# Patient Record
Sex: FEMALE | Race: BLACK OR AFRICAN AMERICAN | Employment: OTHER | URBAN - METROPOLITAN AREA
[De-identification: names, ages, dates, MRNs, and addresses within clinical notes are randomized per-mention and may not be internally consistent; named-entity substitution may affect disease eponyms.]

---

## 2017-01-25 ENCOUNTER — HOSPITAL ENCOUNTER (EMERGENCY)
Age: 62
Discharge: HOME | End: 2017-01-25
Attending: EMERGENCY MEDICINE
Payer: MEDICARE

## 2017-01-25 ENCOUNTER — APPOINTMENT (OUTPATIENT)
Dept: GENERAL RADIOLOGY | Age: 62
End: 2017-01-25
Attending: EMERGENCY MEDICINE
Payer: MEDICARE

## 2017-01-25 VITALS
TEMPERATURE: 98.7 F | DIASTOLIC BLOOD PRESSURE: 96 MMHG | BODY MASS INDEX: 41.95 KG/M2 | HEIGHT: 70 IN | OXYGEN SATURATION: 98 % | HEART RATE: 75 BPM | SYSTOLIC BLOOD PRESSURE: 193 MMHG | RESPIRATION RATE: 22 BRPM | WEIGHT: 293 LBS

## 2017-01-25 DIAGNOSIS — J45.901 ASTHMA WITH ACUTE EXACERBATION, UNSPECIFIED ASTHMA SEVERITY: ICD-10-CM

## 2017-01-25 DIAGNOSIS — J18.9 COMMUNITY ACQUIRED PNEUMONIA: Primary | ICD-10-CM

## 2017-01-25 PROCEDURE — 74011000250 HC RX REV CODE- 250: Performed by: EMERGENCY MEDICINE

## 2017-01-25 PROCEDURE — 99285 EMERGENCY DEPT VISIT HI MDM: CPT

## 2017-01-25 PROCEDURE — 94640 AIRWAY INHALATION TREATMENT: CPT

## 2017-01-25 PROCEDURE — 77030013140 HC MSK NEB VYRM -A

## 2017-01-25 PROCEDURE — 71020 XR CHEST PA LAT: CPT

## 2017-01-25 PROCEDURE — 74011636637 HC RX REV CODE- 636/637: Performed by: EMERGENCY MEDICINE

## 2017-01-25 PROCEDURE — A9270 NON-COVERED ITEM OR SERVICE: HCPCS | Performed by: EMERGENCY MEDICINE

## 2017-01-25 PROCEDURE — 74011250637 HC RX REV CODE- 250/637: Performed by: EMERGENCY MEDICINE

## 2017-01-25 RX ORDER — PREDNISONE 20 MG/1
60 TABLET ORAL DAILY
Qty: 15 TAB | Refills: 0 | Status: SHIPPED | OUTPATIENT
Start: 2017-01-25 | End: 2017-01-30

## 2017-01-25 RX ORDER — IPRATROPIUM BROMIDE AND ALBUTEROL SULFATE 2.5; .5 MG/3ML; MG/3ML
3 SOLUTION RESPIRATORY (INHALATION)
Status: COMPLETED | OUTPATIENT
Start: 2017-01-25 | End: 2017-01-25

## 2017-01-25 RX ORDER — LEVOFLOXACIN 750 MG/1
750 TABLET ORAL DAILY
Qty: 7 TAB | Refills: 0 | Status: ON HOLD | OUTPATIENT
Start: 2017-01-25 | End: 2017-02-02

## 2017-01-25 RX ORDER — PREDNISONE 20 MG/1
60 TABLET ORAL
Status: COMPLETED | OUTPATIENT
Start: 2017-01-25 | End: 2017-01-25

## 2017-01-25 RX ORDER — LEVOFLOXACIN 750 MG/1
750 TABLET ORAL
Status: COMPLETED | OUTPATIENT
Start: 2017-01-25 | End: 2017-01-25

## 2017-01-25 RX ORDER — IPRATROPIUM BROMIDE AND ALBUTEROL SULFATE 2.5; .5 MG/3ML; MG/3ML
3 SOLUTION RESPIRATORY (INHALATION)
Qty: 30 NEBULE | Refills: 0 | Status: ON HOLD | OUTPATIENT
Start: 2017-01-25 | End: 2017-02-02

## 2017-01-25 RX ADMIN — PREDNISONE 60 MG: 20 TABLET ORAL at 17:43

## 2017-01-25 RX ADMIN — LEVOFLOXACIN 750 MG: 750 TABLET, FILM COATED ORAL at 18:27

## 2017-01-25 RX ADMIN — IPRATROPIUM BROMIDE AND ALBUTEROL SULFATE 3 ML: .5; 2.5 SOLUTION RESPIRATORY (INHALATION) at 17:44

## 2017-01-25 NOTE — DISCHARGE INSTRUCTIONS
Asthma Attack: Care Instructions  Your Care Instructions    During an asthma attack, the airways swell and narrow. This makes it hard to breathe. Severe asthma attacks can be life-threatening, but you can help prevent them by keeping your asthma under control and treating symptoms before they get bad. Symptoms include being short of breath, having chest tightness, coughing, and wheezing. Noting and treating these symptoms can also help you avoid future trips to the emergency room. The doctor has checked you carefully, but problems can develop later. If you notice any problems or new symptoms, get medical treatment right away. Follow-up care is a key part of your treatment and safety. Be sure to make and go to all appointments, and call your doctor if you are having problems. It's also a good idea to know your test results and keep a list of the medicines you take. How can you care for yourself at home? · Follow your asthma action plan to prevent and treat attacks. If you don't have an asthma action plan, work with your doctor to create one. · Take your asthma medicines exactly as prescribed. Talk to your doctor right away if you have any questions about how to take them. ¨ Use your quick-relief medicine when you have symptoms of an attack. Quick-relief medicine is usually an albuterol inhaler. Some people need to use quick-relief medicine before they exercise. ¨ Take your controller medicine every day, not just when you have symptoms. Controller medicine is usually an inhaled corticosteroid. The goal is to prevent problems before they occur. Don't use your controller medicine to treat an attack that has already started. It doesn't work fast enough to help. ¨ If your doctor prescribed corticosteroid pills to use during an attack, take them exactly as prescribed. It may take hours for the pills to work, but they may make the episode shorter and help you breathe better.   ¨ Keep your quick-relief medicine with you at all times. · Talk to your doctor before using other medicines. Some medicines, such as aspirin, can cause asthma attacks in some people. · If you have a peak flow meter, use it to check how well you are breathing. This can help you predict when an asthma attack is going to occur. Then you can take medicine to prevent the asthma attack or make it less severe. · Do not smoke or allow others to smoke around you. Avoid smoky places. Smoking makes asthma worse. If you need help quitting, talk to your doctor about stop-smoking programs and medicines. These can increase your chances of quitting for good. · Learn what triggers an asthma attack for you, and avoid the triggers when you can. Common triggers include colds, smoke, air pollution, dust, pollen, mold, pets, cockroaches, stress, and cold air. · Avoid colds and the flu. Get a pneumococcal vaccine shot. If you have had one before, ask your doctor if you need a second dose. Get a flu vaccine every fall. If you must be around people with colds or the flu, wash your hands often. When should you call for help? Call 911 anytime you think you may need emergency care. For example, call if:  · You have severe trouble breathing. Call your doctor now or seek immediate medical care if:  · Your symptoms do not get better after you have followed your asthma action plan. · You have new or worse trouble breathing. · Your coughing and wheezing get worse. · You cough up dark brown or bloody mucus (sputum). · You have a new or higher fever. Watch closely for changes in your health, and be sure to contact your doctor if:  · You need to use quick-relief medicine on more than 2 days a week (unless it is just for exercise). · You cough more deeply or more often, especially if you notice more mucus or a change in the color of your mucus. · You are not getting better as expected. Where can you learn more?   Go to http://lindsay-kai.info/. Enter W975 in the search box to learn more about \"Asthma Attack: Care Instructions. \"  Current as of: May 23, 2016  Content Version: 11.1  © 8836-2933 [employer]. Care instructions adapted under license by [employer] (which disclaims liability or warranty for this information). If you have questions about a medical condition or this instruction, always ask your healthcare professional. [place] [address] any warranty or liability for your use of this information. Pneumonia: Care Instructions  Your Care Instructions    Pneumonia is an infection of the lungs. Most cases are caused by infections from bacteria or viruses. Pneumonia may be mild or very severe. If it is caused by bacteria, you will be treated with antibiotics. It may take a few weeks to a few months to recover fully from pneumonia, depending on how sick you were and whether your overall health is good. Follow-up care is a key part of your treatment and safety. Be sure to make and go to all appointments, and call your doctor if you are having problems. Its also a good idea to know your test results and keep a list of the medicines you take. How can you care for yourself at home? · Take your antibiotics exactly as directed. Do not stop taking the medicine just because you are feeling better. You need to take the full course of antibiotics. · Take your medicines exactly as prescribed. Call your doctor if you think you are having a problem with your medicine. · Get plenty of rest and sleep. You may feel weak and tired for a while, but your energy level will improve with time. · To prevent dehydration, drink plenty of fluids, enough so that your urine is light yellow or clear like water. Choose water and other caffeine-free clear liquids until you feel better.  If you have kidney, heart, or liver disease and have to limit fluids, talk with your doctor before you increase the amount of fluids you drink. · Take care of your cough so you can rest. A cough that brings up mucus from your lungs is common with pneumonia. It is one way your body gets rid of the infection. But if coughing keeps you from resting or causes severe fatigue and chest-wall pain, talk to your doctor. He or she may suggest that you take a medicine to reduce the cough. · Use a vaporizer or humidifier to add moisture to your bedroom. Follow the directions for cleaning the machine. · Do not smoke or allow others to smoke around you. Smoke will make your cough last longer. If you need help quitting, talk to your doctor about stop-smoking programs and medicines. These can increase your chances of quitting for good. · Take an over-the-counter pain medicine, such as acetaminophen (Tylenol), ibuprofen (Advil, Motrin), or naproxen (Aleve). Read and follow all instructions on the label. · Do not take two or more pain medicines at the same time unless the doctor told you to. Many pain medicines have acetaminophen, which is Tylenol. Too much acetaminophen (Tylenol) can be harmful. · If you were given a spirometer to measure how well your lungs are working, use it as instructed. This can help your doctor tell how your recovery is going. · To prevent pneumonia in the future, talk to your doctor about getting a flu vaccine (once a year) and a pneumococcal vaccine (one time only for most people). When should you call for help? Call 911 anytime you think you may need emergency care. For example, call if:  · You have severe trouble breathing. Call your doctor now or seek immediate medical care if:  · You cough up dark brown or bloody mucus (sputum). · You have new or worse trouble breathing. · You are dizzy or lightheaded, or you feel like you may faint. Watch closely for changes in your health, and be sure to contact your doctor if:  · You have a new or higher fever.   · You are coughing more deeply or more often.  · You are not getting better after 2 days (48 hours). · You do not get better as expected. Where can you learn more? Go to http://lindsay-kai.info/. Enter [id number] in the search box to learn more about \"Pneumonia: Care Instructions. \"  Current as of: May 23, 2016  Content Version: 11.1  © 4865-6128 [employer]. Care instructions adapted under license by [employer] (which disclaims liability or warranty for this information). If you have questions about a medical condition or this instruction, always ask your healthcare professional. [place] [address] any warranty or liability for your use of this information. We hope that we have addressed all of your medical concerns. The examination and treatment you received in the Emergency Department were for an emergent problem and were not intended as complete care. It is important that you follow up with your healthcare provider(s) for ongoing care. If your symptoms worsen or do not improve as expected, and you are unable to reach your usual health care provider(s), you should return to the Emergency Department. Today's healthcare is undergoing tremendous change, and patient satisfaction surveys are one of the many tools to assess the quality of medical care. You may receive a survey from the [employer] regarding your experience in the Emergency Department. I hope that your experience has been completely positive, particularly the medical care that I provided. As such, please participate in the survey; anything less than excellent does not meet my expectations or intentions. [facility] and [facility] participate in nationally recognized quality of care measures.   If your blood pressure is greater than 120/80, as reported below, we urge that you seek medical care to address the potential of high blood pressure, commonly known as hypertension. Hypertension can be hereditary or can be caused by certain medical conditions, pain, stress, or \"white coat syndrome. \"       Please make an appointment with your health care provider(s) for follow up of your Emergency Department visit. VITALS:   Patient Vitals for the past 8 hrs:   Temp Pulse Resp BP SpO2   01/25/17 1715 - - - 139/57 98 %   01/25/17 1700 - - - 144/66 99 %   01/25/17 1618 98.7 °F (37.1 °C) 75 22 164/74 96 %          Thank you for allowing us to provide you with medical care today. We realize that you have many choices for your emergency care needs. Please choose us in the future for any continued health care needs. Regards,           [place], [address]: [phone]            No results found for this or any previous visit (from the past 24 hour(s)). Xr Chest Pa Lat    Result Date: 1/25/2017  INDICATION:   cough COMPARISON: February 18, 2016 and CT scan May 7, 2015 FINDINGS: Frontal and lateral views of the chest demonstrate a normal cardiomediastinal silhouette. The lungs are adequately expanded. Patchy multifocal airspace opacities with interstitial thickening in both lungs of increased slightly in the interval. There is no pulmonary edema, pleural effusion or pneumothorax. The osseous structures are unremarkable. IMPRESSION: Slight increase in patchy bilateral airspace opacities and interstitial thickening, which have been present on several prior examinations. This suggests possible superimposed acute pneumonia on chronic interstitial lung disease. Correlate with clinical findings.

## 2017-01-25 NOTE — ED NOTES
MD discharged patient and answered questions. Patient wheeled in wheelchair out of ED accompanied by staff and family.

## 2017-01-30 ENCOUNTER — TELEPHONE (OUTPATIENT)
Dept: CARDIOLOGY CLINIC | Age: 62
End: 2017-01-30

## 2017-01-30 NOTE — TELEPHONE ENCOUNTER
Patient states that she was diagnosed with pneumonia last week. States that the medication does not seem to be helping and she has concerns about the cough that she has. Requests a call back at [phone]. Thanks!

## 2017-01-30 NOTE — TELEPHONE ENCOUNTER
Advise for patient to f/u with primary care in regards to this.  She does not currently have a PCP, but will f/u with [facility].

## 2017-02-02 ENCOUNTER — TELEPHONE (OUTPATIENT)
Dept: CARDIOLOGY CLINIC | Age: 62
End: 2017-02-02

## 2017-02-02 ENCOUNTER — HOSPITAL ENCOUNTER (OUTPATIENT)
Dept: CT IMAGING | Age: 62
Discharge: HOME | DRG: 189 | End: 2017-02-02
Attending: NURSE PRACTITIONER
Payer: MEDICARE

## 2017-02-02 ENCOUNTER — HOSPITAL ENCOUNTER (OUTPATIENT)
Dept: VASCULAR SURGERY | Age: 62
Discharge: HOME | DRG: 189 | End: 2017-02-02
Attending: NURSE PRACTITIONER
Payer: MEDICARE

## 2017-02-02 ENCOUNTER — HOSPITAL ENCOUNTER (INPATIENT)
Age: 62
LOS: 8 days | Discharge: HOME HEALTH | DRG: 189 | End: 2017-02-10
Attending: EMERGENCY MEDICINE | Admitting: INTERNAL MEDICINE
Payer: MEDICARE

## 2017-02-02 DIAGNOSIS — Z79.01 CHRONIC ANTICOAGULATION: ICD-10-CM

## 2017-02-02 DIAGNOSIS — R06.02 SHORTNESS OF BREATH: ICD-10-CM

## 2017-02-02 DIAGNOSIS — J18.9 PNEUMONIA: ICD-10-CM

## 2017-02-02 DIAGNOSIS — I50.32 CHRONIC DIASTOLIC HEART FAILURE (HCC): ICD-10-CM

## 2017-02-02 DIAGNOSIS — R06.09 DYSPNEA ON EXERTION: ICD-10-CM

## 2017-02-02 DIAGNOSIS — K59.00 CONSTIPATION, UNSPECIFIED CONSTIPATION TYPE: ICD-10-CM

## 2017-02-02 DIAGNOSIS — I82.441 ACUTE DVT OF RIGHT TIBIAL VEIN (HCC): Primary | ICD-10-CM

## 2017-02-02 DIAGNOSIS — J44.9 CHRONIC OBSTRUCTIVE PULMONARY DISEASE, UNSPECIFIED COPD TYPE (HCC): ICD-10-CM

## 2017-02-02 DIAGNOSIS — Z71.89 GOALS OF CARE, COUNSELING/DISCUSSION: ICD-10-CM

## 2017-02-02 DIAGNOSIS — R60.9 EDEMA: ICD-10-CM

## 2017-02-02 PROBLEM — I82.409 DVT (DEEP VENOUS THROMBOSIS) (HCC): Status: ACTIVE | Noted: 2017-02-02

## 2017-02-02 LAB
ALBUMIN SERPL BCP-MCNC: 2.7 G/DL (ref 3.5–5)
ALBUMIN/GLOB SERPL: 0.6 {RATIO} (ref 1.1–2.2)
ALP SERPL-CCNC: 79 U/L (ref 45–117)
ALT SERPL-CCNC: 17 U/L (ref 12–78)
ANION GAP BLD CALC-SCNC: 13 MMOL/L (ref 5–15)
APTT PPP: 25.4 SEC (ref 22.1–32.5)
AST SERPL W P-5'-P-CCNC: 11 U/L (ref 15–37)
BASOPHILS # BLD AUTO: 0 K/UL (ref 0–0.1)
BASOPHILS # BLD: 0 % (ref 0–1)
BILIRUB SERPL-MCNC: 0.3 MG/DL (ref 0.2–1)
BUN SERPL-MCNC: 57 MG/DL (ref 6–20)
BUN/CREAT SERPL: 18 (ref 12–20)
CALCIUM SERPL-MCNC: 8 MG/DL (ref 8.5–10.1)
CHLORIDE SERPL-SCNC: 107 MMOL/L (ref 97–108)
CO2 SERPL-SCNC: 25 MMOL/L (ref 21–32)
CREAT SERPL-MCNC: 3.25 MG/DL (ref 0.55–1.02)
EOSINOPHIL # BLD: 0 K/UL (ref 0–0.4)
EOSINOPHIL NFR BLD: 0 % (ref 0–7)
ERYTHROCYTE [DISTWIDTH] IN BLOOD BY AUTOMATED COUNT: 14.3 % (ref 11.5–14.5)
GLOBULIN SER CALC-MCNC: 4.2 G/DL (ref 2–4)
GLUCOSE BLD STRIP.AUTO-MCNC: 235 MG/DL (ref 65–100)
GLUCOSE SERPL-MCNC: 84 MG/DL (ref 65–100)
HCT VFR BLD AUTO: 25.9 % (ref 35–47)
HGB BLD-MCNC: 8.5 G/DL (ref 11.5–16)
INR PPP: 1.1 (ref 0.9–1.1)
LACTATE SERPL-SCNC: 1.2 MMOL/L (ref 0.4–2)
LYMPHOCYTES # BLD AUTO: 14 % (ref 12–49)
LYMPHOCYTES # BLD: 2 K/UL (ref 0.8–3.5)
MAGNESIUM SERPL-MCNC: 1.2 MG/DL (ref 1.6–2.4)
MCH RBC QN AUTO: 29.9 PG (ref 26–34)
MCHC RBC AUTO-ENTMCNC: 32.8 G/DL (ref 30–36.5)
MCV RBC AUTO: 91.2 FL (ref 80–99)
MONOCYTES # BLD: 0.3 K/UL (ref 0–1)
MONOCYTES NFR BLD AUTO: 2 % (ref 5–13)
NEUTS SEG # BLD: 11.6 K/UL (ref 1.8–8)
NEUTS SEG NFR BLD AUTO: 84 % (ref 32–75)
PLATELET # BLD AUTO: 346 K/UL (ref 150–400)
POTASSIUM SERPL-SCNC: 3.4 MMOL/L (ref 3.5–5.1)
PROT SERPL-MCNC: 6.9 G/DL (ref 6.4–8.2)
PROTHROMBIN TIME: 10.7 SEC (ref 9–11.1)
RBC # BLD AUTO: 2.84 M/UL (ref 3.8–5.2)
SERVICE CMNT-IMP: ABNORMAL
SODIUM SERPL-SCNC: 145 MMOL/L (ref 136–145)
THERAPEUTIC RANGE,PTTT: NORMAL SECS (ref 58–77)
TROPONIN I SERPL-MCNC: <0.04 NG/ML
WBC # BLD AUTO: 13.9 K/UL (ref 3.6–11)

## 2017-02-02 PROCEDURE — 80053 COMPREHEN METABOLIC PANEL: CPT | Performed by: PHYSICIAN ASSISTANT

## 2017-02-02 PROCEDURE — 85025 COMPLETE CBC W/AUTO DIFF WBC: CPT | Performed by: PHYSICIAN ASSISTANT

## 2017-02-02 PROCEDURE — 74011636637 HC RX REV CODE- 636/637: Performed by: INTERNAL MEDICINE

## 2017-02-02 PROCEDURE — 74011250636 HC RX REV CODE- 250/636: Performed by: PHYSICIAN ASSISTANT

## 2017-02-02 PROCEDURE — 93005 ELECTROCARDIOGRAM TRACING: CPT

## 2017-02-02 PROCEDURE — 85610 PROTHROMBIN TIME: CPT | Performed by: PHYSICIAN ASSISTANT

## 2017-02-02 PROCEDURE — 74011000250 HC RX REV CODE- 250: Performed by: INTERNAL MEDICINE

## 2017-02-02 PROCEDURE — 77030013032 HC MSK BPAP/CPAP FISP -B

## 2017-02-02 PROCEDURE — 65270000029 HC RM PRIVATE

## 2017-02-02 PROCEDURE — 74011250637 HC RX REV CODE- 250/637: Performed by: INTERNAL MEDICINE

## 2017-02-02 PROCEDURE — 87040 BLOOD CULTURE FOR BACTERIA: CPT | Performed by: PHYSICIAN ASSISTANT

## 2017-02-02 PROCEDURE — 77030018846 HC SOL IRR STRL H20 ICUM -A

## 2017-02-02 PROCEDURE — 83735 ASSAY OF MAGNESIUM: CPT | Performed by: PHYSICIAN ASSISTANT

## 2017-02-02 PROCEDURE — 99285 EMERGENCY DEPT VISIT HI MDM: CPT

## 2017-02-02 PROCEDURE — 94640 AIRWAY INHALATION TREATMENT: CPT

## 2017-02-02 PROCEDURE — 83605 ASSAY OF LACTIC ACID: CPT | Performed by: PHYSICIAN ASSISTANT

## 2017-02-02 PROCEDURE — 36415 COLL VENOUS BLD VENIPUNCTURE: CPT | Performed by: INTERNAL MEDICINE

## 2017-02-02 PROCEDURE — 84484 ASSAY OF TROPONIN QUANT: CPT | Performed by: PHYSICIAN ASSISTANT

## 2017-02-02 PROCEDURE — 77030013140 HC MSK NEB VYRM -A

## 2017-02-02 PROCEDURE — 85730 THROMBOPLASTIN TIME PARTIAL: CPT | Performed by: PHYSICIAN ASSISTANT

## 2017-02-02 PROCEDURE — 82962 GLUCOSE BLOOD TEST: CPT

## 2017-02-02 PROCEDURE — 85730 THROMBOPLASTIN TIME PARTIAL: CPT | Performed by: INTERNAL MEDICINE

## 2017-02-02 RX ORDER — AMLODIPINE BESYLATE 5 MG/1
10 TABLET ORAL DAILY
Status: DISCONTINUED | OUTPATIENT
Start: 2017-02-03 | End: 2017-02-10 | Stop reason: HOSPADM

## 2017-02-02 RX ORDER — HEPARIN SODIUM 10000 [USP'U]/100ML
9.8-36 INJECTION, SOLUTION INTRAVENOUS
Status: DISCONTINUED | OUTPATIENT
Start: 2017-02-02 | End: 2017-02-06

## 2017-02-02 RX ORDER — CALCITRIOL 0.25 UG/1
0.25 CAPSULE ORAL
Status: ON HOLD | COMMUNITY
End: 2019-01-18 | Stop reason: CLARIF

## 2017-02-02 RX ORDER — HYDROCODONE BITARTRATE AND ACETAMINOPHEN 5; 325 MG/1; MG/1
1 TABLET ORAL
Status: DISCONTINUED | OUTPATIENT
Start: 2017-02-02 | End: 2017-02-02

## 2017-02-02 RX ORDER — WARFARIN SODIUM 5 MG/1
5 TABLET ORAL EVERY EVENING
Status: COMPLETED | OUTPATIENT
Start: 2017-02-02 | End: 2017-02-02

## 2017-02-02 RX ORDER — MAGNESIUM SULFATE 100 %
4 CRYSTALS MISCELLANEOUS AS NEEDED
Status: DISCONTINUED | OUTPATIENT
Start: 2017-02-02 | End: 2017-02-10 | Stop reason: HOSPADM

## 2017-02-02 RX ORDER — BENZONATATE 100 MG/1
100 CAPSULE ORAL
COMMUNITY
End: 2017-02-16

## 2017-02-02 RX ORDER — HEPARIN SODIUM 5000 [USP'U]/ML
10000 INJECTION, SOLUTION INTRAVENOUS; SUBCUTANEOUS ONCE
Status: COMPLETED | OUTPATIENT
Start: 2017-02-02 | End: 2017-02-02

## 2017-02-02 RX ORDER — IPRATROPIUM BROMIDE AND ALBUTEROL SULFATE 2.5; .5 MG/3ML; MG/3ML
3 SOLUTION RESPIRATORY (INHALATION)
Status: DISCONTINUED | OUTPATIENT
Start: 2017-02-02 | End: 2017-02-10 | Stop reason: HOSPADM

## 2017-02-02 RX ORDER — TEMAZEPAM 15 MG/1
30 CAPSULE ORAL
Status: DISCONTINUED | OUTPATIENT
Start: 2017-02-02 | End: 2017-02-10 | Stop reason: HOSPADM

## 2017-02-02 RX ORDER — MUPIROCIN 20 MG/G
OINTMENT TOPICAL
COMMUNITY
End: 2017-04-17 | Stop reason: ALTCHOICE

## 2017-02-02 RX ORDER — SODIUM CHLORIDE 0.9 % (FLUSH) 0.9 %
5-10 SYRINGE (ML) INJECTION EVERY 8 HOURS
Status: DISCONTINUED | OUTPATIENT
Start: 2017-02-02 | End: 2017-02-03 | Stop reason: SDUPTHER

## 2017-02-02 RX ORDER — HEPARIN SODIUM 10000 [USP'U]/100ML
12-25 INJECTION, SOLUTION INTRAVENOUS
Status: DISCONTINUED | OUTPATIENT
Start: 2017-02-02 | End: 2017-02-02

## 2017-02-02 RX ORDER — ATORVASTATIN CALCIUM 20 MG/1
80 TABLET, FILM COATED ORAL EVERY EVENING
Status: DISCONTINUED | OUTPATIENT
Start: 2017-02-02 | End: 2017-02-10 | Stop reason: HOSPADM

## 2017-02-02 RX ORDER — CODEINE PHOSPHATE AND GUAIFENESIN 10; 100 MG/5ML; MG/5ML
5 SOLUTION ORAL
COMMUNITY
End: 2017-02-10

## 2017-02-02 RX ORDER — UREA 10 %
800 LOTION (ML) TOPICAL DAILY
Status: DISCONTINUED | OUTPATIENT
Start: 2017-02-03 | End: 2017-02-02

## 2017-02-02 RX ORDER — BENZONATATE 100 MG/1
100 CAPSULE ORAL
Status: DISCONTINUED | OUTPATIENT
Start: 2017-02-02 | End: 2017-02-10 | Stop reason: HOSPADM

## 2017-02-02 RX ORDER — ZOLPIDEM TARTRATE 5 MG/1
5 TABLET ORAL
Status: DISCONTINUED | OUTPATIENT
Start: 2017-02-02 | End: 2017-02-06

## 2017-02-02 RX ORDER — TEMAZEPAM 30 MG/1
30 CAPSULE ORAL
COMMUNITY
End: 2017-02-16

## 2017-02-02 RX ORDER — LOSARTAN POTASSIUM 25 MG/1
25 TABLET ORAL DAILY
Status: DISCONTINUED | OUTPATIENT
Start: 2017-02-03 | End: 2017-02-03

## 2017-02-02 RX ORDER — ISOSORBIDE MONONITRATE 30 MG/1
120 TABLET, EXTENDED RELEASE ORAL DAILY
Status: DISCONTINUED | OUTPATIENT
Start: 2017-02-03 | End: 2017-02-10 | Stop reason: HOSPADM

## 2017-02-02 RX ORDER — PANTOPRAZOLE SODIUM 40 MG/1
40 TABLET, DELAYED RELEASE ORAL DAILY
Status: DISCONTINUED | OUTPATIENT
Start: 2017-02-03 | End: 2017-02-10 | Stop reason: HOSPADM

## 2017-02-02 RX ORDER — AMOXICILLIN AND CLAVULANATE POTASSIUM 875; 125 MG/1; MG/1
1 TABLET, FILM COATED ORAL EVERY 12 HOURS
COMMUNITY
Start: 2017-02-01 | End: 2017-02-10

## 2017-02-02 RX ORDER — AZITHROMYCIN 250 MG/1
500 TABLET, FILM COATED ORAL DAILY
Status: DISCONTINUED | OUTPATIENT
Start: 2017-02-02 | End: 2017-02-02

## 2017-02-02 RX ORDER — PREDNISONE 20 MG/1
40 TABLET ORAL
Status: DISCONTINUED | OUTPATIENT
Start: 2017-02-03 | End: 2017-02-03

## 2017-02-02 RX ORDER — CARVEDILOL 12.5 MG/1
25 TABLET ORAL 2 TIMES DAILY WITH MEALS
Status: DISCONTINUED | OUTPATIENT
Start: 2017-02-02 | End: 2017-02-10 | Stop reason: HOSPADM

## 2017-02-02 RX ORDER — SODIUM CHLORIDE 0.9 % (FLUSH) 0.9 %
5-10 SYRINGE (ML) INJECTION AS NEEDED
Status: DISCONTINUED | OUTPATIENT
Start: 2017-02-02 | End: 2017-02-03 | Stop reason: SDUPTHER

## 2017-02-02 RX ORDER — INSULIN LISPRO 100 [IU]/ML
INJECTION, SOLUTION INTRAVENOUS; SUBCUTANEOUS
Status: DISCONTINUED | OUTPATIENT
Start: 2017-02-02 | End: 2017-02-10 | Stop reason: HOSPADM

## 2017-02-02 RX ORDER — ALBUTEROL SULFATE 90 UG/1
2 AEROSOL, METERED RESPIRATORY (INHALATION)
COMMUNITY
End: 2017-04-17 | Stop reason: HOSPADM

## 2017-02-02 RX ORDER — PREDNISONE 10 MG/1
TABLET ORAL
COMMUNITY
Start: 2017-02-02 | End: 2017-02-10

## 2017-02-02 RX ORDER — BUMETANIDE 1 MG/1
4 TABLET ORAL DAILY
Status: DISCONTINUED | OUTPATIENT
Start: 2017-02-03 | End: 2017-02-03

## 2017-02-02 RX ORDER — FLUTICASONE PROPIONATE 50 MCG
2 SPRAY, SUSPENSION (ML) NASAL
COMMUNITY
End: 2019-01-08 | Stop reason: ALTCHOICE

## 2017-02-02 RX ORDER — HYDROCODONE BITARTRATE AND ACETAMINOPHEN 5; 325 MG/1; MG/1
2 TABLET ORAL
Status: DISCONTINUED | OUTPATIENT
Start: 2017-02-02 | End: 2017-02-03

## 2017-02-02 RX ORDER — FLUTICASONE PROPIONATE 50 MCG
2 SPRAY, SUSPENSION (ML) NASAL
Status: DISCONTINUED | OUTPATIENT
Start: 2017-02-02 | End: 2017-02-10 | Stop reason: HOSPADM

## 2017-02-02 RX ORDER — SUCRALFATE 1 G/1
1 TABLET ORAL
COMMUNITY
End: 2017-08-18

## 2017-02-02 RX ORDER — CLOPIDOGREL BISULFATE 75 MG/1
75 TABLET ORAL DAILY
Status: DISCONTINUED | OUTPATIENT
Start: 2017-02-03 | End: 2017-02-10

## 2017-02-02 RX ORDER — NALOXONE HYDROCHLORIDE 0.4 MG/ML
0.4 INJECTION, SOLUTION INTRAMUSCULAR; INTRAVENOUS; SUBCUTANEOUS AS NEEDED
Status: DISCONTINUED | OUTPATIENT
Start: 2017-02-02 | End: 2017-02-10 | Stop reason: HOSPADM

## 2017-02-02 RX ORDER — IPRATROPIUM BROMIDE AND ALBUTEROL SULFATE 2.5; .5 MG/3ML; MG/3ML
3 SOLUTION RESPIRATORY (INHALATION)
COMMUNITY
End: 2017-09-15

## 2017-02-02 RX ORDER — DEXTROSE 50 % IN WATER (D50W) INTRAVENOUS SYRINGE
12.5-25 AS NEEDED
Status: DISCONTINUED | OUTPATIENT
Start: 2017-02-02 | End: 2017-02-10 | Stop reason: HOSPADM

## 2017-02-02 RX ORDER — HEPARIN SODIUM 5000 [USP'U]/ML
5000 INJECTION, SOLUTION INTRAVENOUS; SUBCUTANEOUS
Status: DISCONTINUED | OUTPATIENT
Start: 2017-02-02 | End: 2017-02-02

## 2017-02-02 RX ORDER — ONDANSETRON 2 MG/ML
4 INJECTION INTRAMUSCULAR; INTRAVENOUS
Status: DISCONTINUED | OUTPATIENT
Start: 2017-02-02 | End: 2017-02-10 | Stop reason: HOSPADM

## 2017-02-02 RX ORDER — SUCRALFATE 1 G/1
1 TABLET ORAL
Status: DISCONTINUED | OUTPATIENT
Start: 2017-02-02 | End: 2017-02-10 | Stop reason: HOSPADM

## 2017-02-02 RX ORDER — HEPARIN SODIUM 10000 [USP'U]/100ML
9.8-36 INJECTION, SOLUTION INTRAVENOUS
Status: DISCONTINUED | OUTPATIENT
Start: 2017-02-02 | End: 2017-02-02

## 2017-02-02 RX ADMIN — SUCRALFATE 1 G: 1 TABLET ORAL at 21:56

## 2017-02-02 RX ADMIN — ATORVASTATIN CALCIUM 80 MG: 20 TABLET, FILM COATED ORAL at 18:59

## 2017-02-02 RX ADMIN — FLUTICASONE PROPIONATE 2 SPRAY: 50 SPRAY, METERED NASAL at 21:53

## 2017-02-02 RX ADMIN — INSULIN LISPRO 2 UNITS: 100 INJECTION, SOLUTION INTRAVENOUS; SUBCUTANEOUS at 21:54

## 2017-02-02 RX ADMIN — CARVEDILOL 25 MG: 12.5 TABLET, FILM COATED ORAL at 18:59

## 2017-02-02 RX ADMIN — Medication 10 ML: at 17:31

## 2017-02-02 RX ADMIN — BENZONATATE 100 MG: 100 CAPSULE ORAL at 19:00

## 2017-02-02 RX ADMIN — HEPARIN SODIUM 10000 UNITS: 5000 INJECTION, SOLUTION INTRAVENOUS; SUBCUTANEOUS at 17:25

## 2017-02-02 RX ADMIN — WARFARIN SODIUM 5 MG: 5 TABLET ORAL at 18:58

## 2017-02-02 RX ADMIN — IPRATROPIUM BROMIDE AND ALBUTEROL SULFATE 3 ML: .5; 2.5 SOLUTION RESPIRATORY (INHALATION) at 20:17

## 2017-02-02 RX ADMIN — HYDROCODONE BITARTRATE AND ACETAMINOPHEN 2 TABLET: 5; 325 TABLET ORAL at 22:32

## 2017-02-02 RX ADMIN — HYDROCODONE BITARTRATE AND ACETAMINOPHEN 1 TABLET: 5; 325 TABLET ORAL at 19:00

## 2017-02-02 RX ADMIN — HEPARIN SODIUM AND DEXTROSE 9.8 UNITS/KG/HR: 10000; 5 INJECTION INTRAVENOUS at 17:30

## 2017-02-02 NOTE — ED PROVIDER NOTES
HPI Comments: Sherine Maloney is a 63 y.o. female who presents ambulatory to the ED with a c/o worsening sob x 2 weeks. Pt notes she was seen in the ER 1/25/17 for CAP, then saw pt first a few days later as she was not better and was seen by pulmonology yesterday. She was sent for out patient doppler to her legs and and non contrast ct of her chest as she is allergic to contrast dye. Pt's us was positive for DVT and her CT showed new parenchymal opacities. Pt's pulmonologist requested pt come to the ER for admission for concern for PE and need for bronchoscopy. Pt is taking plavix and asa as she has 3 cardiac stents. Pt notes cp, sob, no fevers, non productive cough and fatigue. She notes she is on home oxygen. She specifically denies any fevers, chills, nausea, vomiting, headache, rash, diarrhea, sweating or weight loss.  Pt is normally on 2 L NC at home      PCP: None  Pulmonology: Dr Chris Segura  Cardiology: Dr. Candance Corning  Nephrology: Dr Jessica Salas  Endocrinology: Dr. Aniceto Murillo    PMHx significant for: Past Medical History:     Sleep Apnea                                    2/16/2011     Angina, class III ([address]                         8/9/2013      Aortic aneurysm ([address]                                         CAD (coronary Artery Disease) Native Artery     2/16/2011     CKD (chronic kidney disease) stage 3, GFR 30-5* 10/5/2012     Diabetic gastroparesis (HCC)                                  Diastolic heart failure ([address]                   10/5/2012     Esophageal stricture                            2012            Comment:dialted Dr. Cordell Mendez    G6PD deficiency [facility])                                           Comment:trait    GERD (gastroesophageal reflux disease)                        Hypertensive Cardiovasc Dis Benign, No CHF      2/16/2011     ILD (interstitial lung disease) ([address]                           Comment:open lung bx CJW 2010    OA (osteoarthritis)                                           Obesity 2/16/2011     Ovarian cancer ([address]                                            Comment:cervical and uterine    Rheumatoid arteritis ([address]                                    T2DM (type 2 diabetes mellitus) ([address]           8/9/2013      Tobacco use disorder                            3/21/2012     Uterine cervix cancer ([address]                                   Vitamin D deficiency                            8/9/2013    PSHx significant for: Past Surgical History:    HX ORTHOPAEDIC                                   11/12/12        Comment:right knee replacement    HX HYSTERECTOMY                                                HX CHOLECYSTECTOMY                                             HX APPENDECTOMY                                                HX HERNIA REPAIR                                               HX CARPAL TUNNEL RELEASE                                         Comment:bilateral    HX TONSIL AND ADENOIDECTOMY                                    HI CARDIAC SURG PROCEDURE UNLIST                                 Comment:stents    UPPER GI ENDOSCOPY,DILATN W GUIDE                6/24/2016       Comment:     COLONOSCOPY                                     N/A 6/24/2016       Comment:COLONOSCOPY performed by Jaret Angeles MD                at [address] Hx: Tobacco: denies current quit 3 years ago EtOH: denies Illicit drug use: denies    There are no further complaints or symptoms at this time. The history is provided by the patient and the spouse.         Past Medical History:   Diagnosis Date     Sleep Apnea 2/16/2011    Angina, class III ([address] 8/9/2013    Aortic aneurysm ([address]     CAD (coronary Artery Disease) Native Artery 2/16/2011    CKD (chronic kidney disease) stage 3, GFR 30-59 ml/min 10/5/2012    Diabetic gastroparesis (HCC)     Diastolic heart failure ([address] 10/5/2012    Esophageal stricture 2012     dialted Dr. Iwona Eddy G6PD deficiency [facility]) trait    GERD (gastroesophageal reflux disease)     Hypertensive Cardiovasc Dis Benign, No CHF 2/16/2011    ILD (interstitial lung disease) [facility]      open lung bx CJW 2010    OA (osteoarthritis)     Obesity 2/16/2011    Ovarian cancer ([facility]      cervical and uterine    Rheumatoid arteritis ([facility]     T2DM (type 2 diabetes mellitus) ([facility] 8/9/2013    Tobacco use disorder 3/21/2012    Uterine cervix cancer ([facility]     Vitamin D deficiency 8/9/2013       Past Surgical History:   Procedure Laterality Date    Hx orthopaedic  11/12/12     right knee replacement    Hx hysterectomy      Hx cholecystectomy      Hx appendectomy      Hx hernia repair      Hx carpal tunnel release       bilateral    Hx tonsil and adenoidectomy      Pr cardiac surg procedure unlist       stents    Upper gi endoscopy,dilatn w guide  6/24/2016          Colonoscopy N/A 6/24/2016     COLONOSCOPY performed by Vesta Rodriguez MD at [facility] ENDOSCOPY         Family History:   Problem Relation Age of Onset    Heart Disease Mother     Heart Disease Brother        Social History     Social History    Marital status:      Spouse name: N/A    Number of children: N/A    Years of education: N/A     Occupational History    Not on file. Social History Main Topics    Smoking status: Former Smoker     Packs/day: 0.50     Years: 41.00     Types: Cigarettes     Quit date: 11/9/2014    Smokeless tobacco: Never Used    Alcohol use No    Drug use: No    Sexual activity: Not on file     Other Topics Concern    Not on file     Social History Narrative         ALLERGIES: Contrast dye [iodine]; Levaquin [levofloxacin]; and Morphine    Review of Systems   Constitutional: Negative for chills and fever. HENT: Negative for congestion, rhinorrhea, sneezing and sore throat. Eyes: Negative for redness and visual disturbance. Respiratory: Positive for cough and shortness of breath. Cardiovascular: Positive for chest pain.  Negative for leg swelling. Gastrointestinal: Negative for abdominal pain, nausea and vomiting. Genitourinary: Negative for difficulty urinating and frequency. Musculoskeletal: Negative for back pain, myalgias and neck stiffness. Skin: Negative for rash. Neurological: Negative for dizziness, syncope, weakness and headaches. Hematological: Negative for adenopathy. Patient Vitals for the past 12 hrs:   Temp Pulse Resp BP SpO2   02/02/17 2118 98 °F (36.7 °C) 89 18 120/61 94 %   02/02/17 1552 - 86 17 113/56 100 %   02/02/17 1446 98.2 °F (36.8 °C) 86 21 - 100 %   02/02/17 1445 - - - 140/67 100 %              Physical Exam   Constitutional: She is oriented to person, place, and time. She appears well-developed and well-nourished. No distress. Above average bmi female on nasal cannula   HENT:   Head: Normocephalic and atraumatic. Right Ear: External ear normal.   Left Ear: External ear normal.   Neck: Neck supple. Cardiovascular: Normal rate, regular rhythm, normal heart sounds and intact distal pulses. Exam reveals no gallop and no friction rub. No murmur heard. Pulmonary/Chest: Effort normal and breath sounds normal. No stridor. No respiratory distress. She has no wheezes. She has no rales. She exhibits no tenderness. Coarse bronchial cough, coarse breath sounds throughout   Abdominal: Soft. Bowel sounds are normal. She exhibits no distension and no mass. There is no tenderness. There is no rebound and no guarding. Musculoskeletal: Normal range of motion. She exhibits no edema, tenderness or deformity. Neurological: She is alert and oriented to person, place, and time. No cranial nerve deficit. Coordination normal.   Skin: No rash noted. No erythema. No pallor. Psychiatric: She has a normal mood and affect. Her behavior is normal.   Nursing note and vitals reviewed.        MDM  Number of Diagnoses or Management Options  Acute DVT of right tibial vein [facility]):   Chronic anticoagulation:   Chronic obstructive pulmonary disease, unspecified COPD type [facility]):      Amount and/or Complexity of Data Reviewed  Clinical lab tests: ordered and reviewed  Tests in the radiology section of CPT®: reviewed  Tests in the medicine section of CPT®: ordered and reviewed  Obtain history from someone other than the patient: yes ()  Review and summarize past medical records: yes  Independent visualization of images, tracings, or specimens: yes    Patient Progress  Patient progress: stable    ED Course       Procedures    ED EKG interpretation: 2:41 PM  Rhythm: normal sinus rhythm and PVC's; and regular . Rate (approx.): 80; Axis: normal; P wave: normal; QRS interval: normal ; ST/T wave: normal; Other findings: otherwise normal. This EKG was interpreted by Jan Nichols DO,ED Provider. 2:54 PM  Discussed pt, sx, hx and current findings with Dr Lorena Del Angel. He is in agreement with plan and will see pt  [facility]. ROBBI Garcia    3:53 PM  [place]. ROBBI Garcia spoke with Dr. Flex Solomon, Consult for Hospitalist. Discussed available diagnostic tests and clinical findings. He is in agreement with care plans as outlined. He will admit and requests  Heparin to be started   Carly Benz PA-C    3:54 PM  [facility]. ROBBI Garcia spoke with Zuleyma Teresa, from pharmacy,. Discussed available diagnostic tests and clinical findings. He recommends using the order set and dosing for 80u/kg bolus and then the drip immediatly following for the heparin.    Carly Benz PA-C      LABORATORY TESTS:  Recent Results (from the past 12 hour(s))   CBC WITH AUTOMATED DIFF    Collection Time: 02/02/17  3:20 PM   Result Value Ref Range    WBC 13.9 (H) 3.6 - 11.0 K/uL    RBC 2.84 (L) 3.80 - 5.20 M/uL    HGB 8.5 (L) 11.5 - 16.0 g/dL    HCT 25.9 (L) 35.0 - 47.0 %    MCV 91.2 80.0 - 99.0 FL    MCH 29.9 26.0 - 34.0 PG    MCHC 32.8 30.0 - 36.5 g/dL    RDW 14.3 11.5 - 14.5 %    PLATELET 915 693 - 199 K/uL    NEUTROPHILS 84 (H) 32 - 75 %    LYMPHOCYTES 14 12 - 49 % MONOCYTES 2 (L) 5 - 13 %    EOSINOPHILS 0 0 - 7 %    BASOPHILS 0 0 - 1 %    ABS. NEUTROPHILS 11.6 (H) 1.8 - 8.0 K/UL    ABS. LYMPHOCYTES 2.0 0.8 - 3.5 K/UL    ABS. MONOCYTES 0.3 0.0 - 1.0 K/UL    ABS. EOSINOPHILS 0.0 0.0 - 0.4 K/UL    ABS. BASOPHILS 0.0 0.0 - 0.1 K/UL   METABOLIC PANEL, COMPREHENSIVE    Collection Time: 02/02/17  3:20 PM   Result Value Ref Range    Sodium 145 136 - 145 mmol/L    Potassium 3.4 (L) 3.5 - 5.1 mmol/L    Chloride 107 97 - 108 mmol/L    CO2 25 21 - 32 mmol/L    Anion gap 13 5 - 15 mmol/L    Glucose 84 65 - 100 mg/dL    BUN 57 (H) 6 - 20 MG/DL    Creatinine 3.25 (H) 0.55 - 1.02 MG/DL    BUN/Creatinine ratio 18 12 - 20      GFR est AA 18 (L) >60 ml/min/1.73m2    GFR est non-AA 15 (L) >60 ml/min/1.73m2    Calcium 8.0 (L) 8.5 - 10.1 MG/DL    Bilirubin, total 0.3 0.2 - 1.0 MG/DL    ALT (SGPT) 17 12 - 78 U/L    AST (SGOT) 11 (L) 15 - 37 U/L    Alk.  phosphatase 79 45 - 117 U/L    Protein, total 6.9 6.4 - 8.2 g/dL    Albumin 2.7 (L) 3.5 - 5.0 g/dL    Globulin 4.2 (H) 2.0 - 4.0 g/dL    A-G Ratio 0.6 (L) 1.1 - 2.2     MAGNESIUM    Collection Time: 02/02/17  3:20 PM   Result Value Ref Range    Magnesium 1.2 (L) 1.6 - 2.4 mg/dL   TROPONIN I    Collection Time: 02/02/17  3:20 PM   Result Value Ref Range    Troponin-I, Qt. <0.04 <0.05 ng/mL   PROTHROMBIN TIME + INR    Collection Time: 02/02/17  3:20 PM   Result Value Ref Range    INR 1.1 0.9 - 1.1      Prothrombin time 10.7 9.0 - 11.1 sec   PTT    Collection Time: 02/02/17  3:20 PM   Result Value Ref Range    aPTT 25.4 22.1 - 32.5 sec    aPTT, therapeutic range     58.0 - 77.0 SECS   LACTIC ACID, PLASMA    Collection Time: 02/02/17  3:20 PM   Result Value Ref Range    Lactic acid 1.2 0.4 - 2.0 MMOL/L   GLUCOSE, POC    Collection Time: 02/02/17  9:20 PM   Result Value Ref Range    Glucose (POC) 235 (H) 65 - 100 mg/dL    Performed by Dionne Rose (PCT)        IMAGING RESULTS:  No orders to display     Study Result      Indication: Pneumonia, interstitial lung disease, sleep apnea.     COMPARISON: CT chest of 5/17/2015 and chest radiograph of 1/25/2017     Multiple axial images were obtained from the lung apices to the adrenal glands. Intravenous contrast into was not utilized. Images were reformatted in the  sagittal and coronal plane. . CT dose reduction was achieved through use of a  standardized protocol tailored for this examination and automatic exposure  control for dose modulation.     The mediastinal windows demonstrate that there is now present a small  moderate-sized pericardial effusion this has increased in the interval. Clinical  correlation is suggested. No pleural effusions.     The main pulmonary artery segment measures 3.7 cm. This is enlarged and  consistent with pulmonary hypertension.     The calcified mediastinal lymph nodes are noted. The lymph nodes that are at the  upper limits of normal size in the right paratracheal region are unchanged. No  significant adenopathy.     There is severe coronary artery calcification. The degree of calcification  greater than expected for patient this age.     Imaging through the upper abdomen reveals no adrenal lesions. There are  calcifications in the liver and spleen consistent with old granulomatous  disease.     The lungs are abnormal bilaterally. There are irregular parenchymal opacities  linear and reticular as well as groundglass opacities in the right upper lung  zone which have slightly progressed in the interval. This may merely be  progression of the previous disease. However, there are more focal areas of  opacity adjacent to the anterior segment of the right upper lobe bronchus  extending anteriorly that may reflect mild acute pneumonia. There are also  increasing groundglass opacities in the left upper lobe and lingula these  changes are mild but could be due to superimposed acute infection or mild  alveolitis.  There is also mild increased groundglass opacities in the superior  segment of the left lower lobe.     Both lower lobes continue to demonstrate areas of honeycombing in the periphery  right greater than left. There is apparent honeycombing are unchanged. The  bibasilar bronchiectasis is also unchanged.     The central airways are patent.     IMPRESSION  IMPRESSION:  1. There are new increased parenchymal opacities identified most pronounced  anteriorly in the right upper lobe but also in the left upper lobe and to a  lesser extent superior segment of the left lower lobe. These findings  demonstrate increased groundglass opacities this could be due to alveolitis this  could be due to mild acute infection. This may merely be progression of the  previous disease as the areas of opacity are in a similar distribution as was  previously noted. 2. Bibasilar honeycombing right greater than left is unchanged as is bibasilar  bronchiectasis. 3. Severe coronary calcification. 4. Enlarged main pulmonary segment suggest pulmonary artery hypertension. 5. Increase in size of pericardial effusion. Clinical correlation is suggested. .         []Hide copied text  []Laxmi for attribution information     [facility]  ** PRELIMINARY REPORT **     Name: Mynor Hernandes  MRN: [id number] Outpatient  : 14 Dec 1955  HIS Order #: [id number]  [id number] [facility] Visit #: [id number]  Date: 2017     TYPE OF TEST: Peripheral Venous Testing     REASON FOR TEST  Edema, Shortness of breath     Right Leg:-  Deep venous thrombosis: Yes  Proximal extent of thrombus: Posterior Tibial  Superficial venous thrombosis: No  Deep venous insufficiency: No  Superficial venous insufficiency: No     Left Leg:-  Deep venous thrombosis: No  Superficial venous thrombosis: No  Deep venous insufficiency: No  Superficial venous insufficiency: No        INTERPRETATION/FINDINGS  PROCEDURE: BILATERAL LE VENOUS DUPLEX.   Evaluation of lower extremity veins with ultrasound (B-mode imaging,  pulsed Doppler, color Doppler). Includes the common femoral, deep  femoral, femoral, popliteal, posterior tibial, peroneal, and great  saphenous veins.     FINDINGS: In the right lower extremity, 1 of 2 posterior tibial veins  is not compressible with no color flow suggesting occlusive thrombus. Thrombus appears to be indeterminate age. Unable to fully evaluate  the calf veins bilaterally secondary to pateints body habitus. Gray  scale and color flow duplex images of the remaining veins in both  lower extremities demonstrate normal compressibility, spontaneous and  augmented flow profiles, and absence of filling defects throughout the  deep and superficial veins in both lower extremities.     CONCLUSION: Right lower extremity venous duplex positive for deep  venous thrombosis involving the posterior tibial vein.  Left lower  extremity venous duplex negative for deep venous thrombosis or  thrombophlebitis.     ADDITIONAL COMMENTS     I have personally reviewed the data relevant to the interpretation of  this study.     TECHNOLOGIST: Renetta Martinez RDCS  Signed: 02/02/2017 02:25 PM               MEDICATIONS GIVEN:  Medications   sodium chloride (NS) flush 5-10 mL (not administered)   sodium chloride (NS) flush 5-10 mL ( IntraVENous Canceled Entry 2/2/17 2200)   sodium chloride (NS) flush 5-10 mL (not administered)   sodium chloride (NS) flush 5-10 mL ( IntraVENous Canceled Entry 2/2/17 2200)   sodium chloride (NS) flush 5-10 mL (not administered)   zolpidem (AMBIEN) tablet 5 mg (not administered)   naloxone (NARCAN) injection 0.4 mg (not administered)   ondansetron (ZOFRAN) injection 4 mg (not administered)   glucose chewable tablet 16 g (not administered)   dextrose (D50W) injection syrg 12.5-25 g (not administered)   glucagon (GLUCAGEN) injection 1 mg (not administered)   insulin lispro (HUMALOG) injection (2 Units SubCUTAneous Given 2/2/17 5406)   isosorbide mononitrate ER (IMDUR) tablet 120 mg (not administered)   bumetanide (BUMEX) tablet 4 mg (not administered)   carvedilol (COREG) tablet 25 mg (25 mg Oral Given 2/2/17 1859)   losartan (COZAAR) tablet 25 mg (not administered)   clopidogrel (PLAVIX) tablet 75 mg (not administered)   pantoprazole (PROTONIX) tablet 40 mg (not administered)   amLODIPine (NORVASC) tablet 10 mg (not administered)   atorvastatin (LIPITOR) tablet 80 mg (80 mg Oral Given 2/2/17 1859)   Warfarin pharmacy to dose (not administered)   predniSONE (DELTASONE) tablet 40 mg (not administered)   albuterol-ipratropium (DUO-NEB) 2.5 MG-0.5 MG/3 ML (3 mL Nebulization Given 2/2/17 2017)   benzonatate (TESSALON) capsule 100 mg (100 mg Oral Given 2/2/17 1900)   fluticasone (FLONASE) 50 mcg/actuation nasal spray 2 Spray (2 Sprays Both Nostrils Given 2/2/17 2153)   temazepam (RESTORIL) capsule 30 mg (not administered)   sucralfate (CARAFATE) tablet 1 g (1 g Oral Given 2/2/17 2156)   heparin 25,000 units in D5W 250 ml infusion (9.8 Units/kg/hr × 151.3 kg IntraVENous Rate Verify 2/2/17 1946)   influenza vaccine 2016-17 (36mos+)(PF) (FLUZONE/FLUARIX/FLULAVAL QUAD) injection 0.5 mL (not administered)   HYDROcodone-acetaminophen (NORCO) 5-325 mg per tablet 2 Tab (2 Tabs Oral Given 2/2/17 2232)   heparin (porcine) injection 10,000 Units (10,000 Units IntraVENous Given 2/2/17 1725)   warfarin (COUMADIN) tablet 5 mg (5 mg Oral Given 2/2/17 1858)       IMPRESSION:  1. Acute DVT of right tibial vein ([facility]    2. Chronic anticoagulation    3. Chronic obstructive pulmonary disease, unspecified COPD type ([facility]        PLAN:  1. Admit to hospital      3:59 PM   Pt is being admitted to the hospital by Dr. Yolanda Zelaya. All available results and reasons for admission have been discussed with pt and/or available family. Pt and/or family convey agreement and understanding of need for admission and of admission diagnosis. Meera Yoon.  ROBBI Garcia

## 2017-02-02 NOTE — PROGRESS NOTES
Preliminary results given to Argentina Preciado NP at 1400 and the patient was informed to expect a call from the office to pick a prescription.

## 2017-02-02 NOTE — TELEPHONE ENCOUNTER
Pt needing a call back to speak with a nurse about something another dr found in her leg. She can be reached at [phone].  [employer]

## 2017-02-02 NOTE — PROGRESS NOTES
[facility] Dosing Services: 02/02/17  Consult for Warfarin Dosing by Pharmacy by Dr. Kary Mendenhall provided for this 63 y.o. female for indication of Acute DVT  Day of Therapy: 1  Dose to achieve an INR goal of 2-3    Order entered for  Warfarin  5 (mg) ordered to be given today      Significant drug interactions: Zithromax  Previous dose given none   PT/INR Lab Results   Component Value Date/Time    INR 1.1 02/02/2017 03:20 PM      Platelets Lab Results   Component Value Date/Time    PLATELET 646 35/05/9139 03:20 PM      H/H Lab Results   Component Value Date/Time    HGB 8.5 02/02/2017 03:20 PM        Pharmacy to follow daily and will provide subsequent Warfarin dosing based on clinical status.   Jaspal Fonseca)  Contact information [phone]

## 2017-02-02 NOTE — PROGRESS NOTES
[facility]: MED RECONCILIATION    Comments/Recommendations:    Verifies allergies   Reports compliance to prescribed regimen   Recent ABX for URI  o Augmentin x 10 days started 2/1  o Doxycycline 100mg bid started 1/25 stopped after 5 days  o Bactrim DS started 1/23 stopped after 5 days   Blood sugar  o Consistently elevated over the past three weeks due to multiple courses of steroids  o  this am and the patient took 120 units of Humalog   Blood pressure  o Initially elevated over the past three weeks but over the past few days blood pressure has been running low   Iron infusion  o Patient was to start Iron infusions today at [facility] prescribed by Dr. Tyra Garcia  Medications added:     · Augmentin  · Benzonatate  · CHERATUSSIN AC  · Flonase  · Prednisone  · Temazepam  · Calcitriol    Medications removed:    · Folic acid  · Metolazone    Medications adjusted:    · Duo-neb changed from prn to scheduled  · Carafate changed from solution to tablet    Information obtained from: patient, rx query, medication list (incomplete)    Significant PMH/Disease States:   Patient Active Problem List   Diagnosis Code    CAD (coronary Artery Disease) Native Artery I25.10    JASEN on CPAP G47.33    Interstitial lung disease ([address] J84.9    Pulmonary HTN ([address] I27.2    HTN (hypertension) I10    Morbid obesity E66.01    Esophageal reflux K21.9    Gastroparesis K31.84    Chronic diastolic heart failure (HCC) I50.32    CKD (chronic kidney disease) stage 3, GFR 30-59 ml/min N18.3    DJD (degenerative joint disease) of knee M17.9    Normocytic anemia D64.9    DM (diabetes mellitus), type 2 with renal complications ([address] K98.24    Vitamin D deficiency E55.9    Angina, class III ([address] I20.9    DVT (deep venous thrombosis) ([address] I82.409     Past Medical History   Diagnosis Date     Sleep Apnea 2/16/2011    Angina, class III ([address] 8/9/2013    Aortic aneurysm ([address]     CAD (coronary Artery Disease) Native Artery 2/16/2011    CKD (chronic kidney disease) stage 3, GFR 30-59 ml/min 10/5/2012    Diabetic gastroparesis (HCC)     Diastolic heart failure ([facility] 10/5/2012    Esophageal stricture 2012     dialted Dr. Selin Christian G6PD deficiency ([facility]      trait    GERD (gastroesophageal reflux disease)     Hypertensive Cardiovasc Dis Benign, No CHF 2/16/2011    ILD (interstitial lung disease) ([facility]      open lung bx CJW 2010    OA (osteoarthritis)     Obesity 2/16/2011    Ovarian cancer ([facility]      cervical and uterine    Rheumatoid arteritis ([facility]     T2DM (type 2 diabetes mellitus) ([facility] 8/9/2013    Tobacco use disorder 3/21/2012    Uterine cervix cancer ([facility]     Vitamin D deficiency 8/9/2013     Chief Complaint for this Admission:   Chief Complaint   Patient presents with    Shortness of Breath     Allergies: Contrast dye [iodine]; Levaquin [levofloxacin]; and Morphine    Prior to Admission Medications:     Medication Documentation Review Audit       Reviewed by Judge Song PHARMD (Pharmacist) on 02/02/17 at 1724         Medication Sig Documenting Provider Last Dose Status Taking? albuterol (PROVENTIL HFA, VENTOLIN HFA, PROAIR HFA) 90 mcg/actuation inhaler Take 2 Puffs by inhalation every four (4) hours as needed for Wheezing. Historical Provider  Active Yes    albuterol-ipratropium (DUONEB) 2.5 mg-0.5 mg/3 ml nebu 3 mL by Nebulization route every four (4) hours. Historical Provider 2/2/2017 Unknown time Active Yes    amLODIPine (NORVASC) 10 mg tablet Take 10 mg by mouth daily. Historical Provider 2/2/2017 Unknown time Active Yes    amoxicillin-clavulanate (AUGMENTIN) 875-125 mg per tablet Take 1 Tab by mouth every twelve (12) hours. X 10 days started 2/1/17 Historical Provider 2/2/2017 am Active Yes    aspirin 81 mg tablet Take 81 mg by mouth daily. Abhijit Frey MD 1/26/2017 Unknown time Active Yes    atorvastatin (LIPITOR) 80 mg tablet Take 80 mg by mouth every evening.    Historical Provider 2/1/2017 Unknown time Active Yes    benzonatate (TESSALON PERLES) 100 mg capsule Take 100 mg by mouth three (3) times daily as needed for Cough. Historical Provider  Active Yes    bumetanide (BUMEX) 2 mg tablet Take two tablets twice a day. Stan Dumont MD 2/2/2017 am Active Yes    calcitRIOL (ROCALTROL) 0.25 mcg capsule Take 0.25 mcg by mouth two (2) days a week. Patient takes on Monday and Thursday Historical Provider 2/2/2017 am Active Yes    carvedilol (COREG) 25 mg tablet TAKE 1 TABLET BY MOUTH TWICE A DAY Stan Dumont MD 2/2/2017 am Active Yes    clopidogrel (PLAVIX) 75 mg tablet TAKE 1 TABLET BY MOUTH EVERY DAY Stan Dumont MD 2/2/2017 am Active Yes    ergocalciferol (VITAMIN D2) 50,000 unit capsule Take 50,000 Units by mouth every Tuesday and Thursday. Abhijit Frey MD 2/2/2017 am Active Yes             Med Lynn Ryan Feb 2, 2017  5:13 PM): .      fluticasone (FLONASE) 50 mcg/actuation nasal spray 2 Sprays by Both Nostrils route nightly. Historical Provider 2/1/2017 Unknown time Active Yes    guaiFENesin-codeine (CHERATUSSIN AC) 100-10 mg/5 mL solution Take 5 mL by mouth three (3) times daily as needed for Cough. Historical Provider  Active Yes    INSULIN LISPRO (HUMALOG SC) by SubCUTAneous route Before breakfast, lunch, and dinner. Sliding scale. Abhijit Frey MD 2/2/2017 am Active Yes    isosorbide mononitrate ER (IMDUR) 120 mg CR tablet TAKE 1 TABLET BY MOUTH EVERY DAY Stan Dumont MD 2/2/2017 am Active Yes    losartan (COZAAR) 25 mg tablet TAKE 1 TABLET BY MOUTH ONCE A DAY Katharine Soriano NP 2/2/2017 am Active Yes    mupirocin (BACTROBAN) 2 % ointment Apply  to affected area two (2) times daily as needed (lesions on feet when needed). Ointment external two times daily to lesions on feet until healed - now using as needed Historical Provider  Active Yes    nitroglycerin (NITROSTAT) 0.3 mg SL tablet 1 Tab by SubLINGual route every five (5) minutes as needed for Chest Pain.  Stan Dumont MD Active Yes    oxyCODONE-acetaminophen (PERCOCET 7.5) 7.5-325 mg per tablet Take 1 Tab by mouth every four (4) hours as needed. Historical Provider 1/2/2017 Unknown time Active Yes             Med Note Alphia Dancer, Willy Nam Feb 2, 2017  5:14 PM): .      pantoprazole (PROTONIX) 40 mg tablet Take 1 Tab by mouth daily. Indications: GASTROESOPHAGEAL REFLUX Susie Kumar MD 2/2/2017 am Active Yes    predniSONE (DELTASONE) 10 mg tablet Take  by mouth. First dose 2/2/17 Prednisone 40mg x 3 days then 30mg x 3 days then 20mg x 3 days then 10mg x 3 days Historical Provider 2/2/2017 am Active Yes    sucralfate (CARAFATE) 1 gram tablet Take 1 g by mouth Before breakfast, lunch, dinner and at bedtime. Historical Provider 2/2/2017 am Active Yes    temazepam (RESTORIL) 30 mg capsule Take 30 mg by mouth nightly as needed for Sleep.  Historical Provider  Active Yes                  Thank you,      Joanna Russ, PharmD, BCPS

## 2017-02-02 NOTE — IP AVS SNAPSHOT
Summary of Care Report The Summary of Care report has been created to help improve care coordination. Users with access to [employer] or [address] (Web-based application) may access additional patient information including the Discharge Summary. If you are not currently a [address] user and need more information, please call the number listed below in the [place] section and ask to be connected with Medical Records. Facility Information Name Address Phone [place] [phone] Patient Information Patient Name Sex  Elvira Palacios ([id number]) Female 1955 Discharge Information Admitting Provider Service Area Unit Dalia FordDO / [address] [facility] Med Surg  [phone] Discharge Provider Discharge Date/Time Discharge Disposition Destination (none) 2/10/2017 (Pending) [facility] (none) Patient Language Language ENGLISH [13] Problem List as of 2/10/2017  Date Reviewed: 2017 Codes Priority Class Noted - Resolved CAD (coronary Artery Disease) Native Artery (Chronic) ICD-10-CM: I25.10 ICD-9-CM: 414.01   2011 - Present Overview Addendum 10/5/2012  7:33 AM by PRASHANT Lundy  
  [place] 2004 1v CAD by cath - PCI OM with SAL Cath 2004 - patent stent, no new disease Lexiscan cardiolite - normal perfusion, EF 66% Cath: 3/19/12: PA 55/30 mean 41, wedge 26, RA 24, EDP 35, LVG 55%, LM normal, LAD normal, LCX - OM1 patent stent, OM2 prox 85% long, % with L to R collaterals JASEN on CPAP (Chronic) ICD-10-CM: G47.33 
ICD-9-CM: 327.23   2011 - Present Overview Signed 3/21/2012  8:04 AM by PRASHANT Lundy Dr. CPAP 
  
  
 Interstitial lung disease (HCC) (Chronic) ICD-10-CM: J84.9 ICD-9-CM: 643   2/28/2011 - Present Pulmonary HTN (HCC) (Chronic) ICD-10-CM: I27.2 ICD-9-CM: 416.8   3/21/2012 - Present RESOLVED: Tobacco use disorder (Chronic) ICD-10-CM: B94.757 ICD-9-CM: 305.1   3/21/2012 - 6/25/2015 RESOLVED: Pneumonia, organism unspecified ICD-10-CM: J18.9 ICD-9-CM: 752   10/1/2012 - 10/30/2012 HTN (hypertension) (Chronic) ICD-10-CM: I10 
ICD-9-CM: 401.9   10/1/2012 - Present RESOLVED: JASEN (obstructive sleep apnea) ICD-10-CM: G47.33 
ICD-9-CM: 327.23   10/1/2012 - 10/14/2016 Morbid obesity (Chronic) ICD-10-CM: E66.01 
ICD-9-CM: 278.01   10/1/2012 - Present Esophageal reflux ICD-10-CM: K21.9 ICD-9-CM: 530.81   10/1/2012 - Present Gastroparesis ICD-10-CM: K31.84 ICD-9-CM: 536.3   10/1/2012 - Present RESOLVED: Leukocytosis, unspecified ICD-10-CM: H92.851 ICD-9-CM: 288.60   10/1/2012 - 10/30/2012 RESOLVED: Anemia, unspecified ICD-10-CM: D64.9 ICD-9-CM: 285.9   10/1/2012 - 10/30/2012 RESOLVED: ARF (acute renal failure) ICD-10-CM: N17.9 ICD-9-CM: 584.9   10/1/2012 - 10/30/2012 RESOLVED: Pulmonary edema ICD-10-CM: J81.1 ICD-9-CM: 700   10/1/2012 - 10/30/2012 Chronic diastolic heart failure (HCC) (Chronic) ICD-10-CM: I50.32 
ICD-9-CM: 428.32   10/5/2012 - Present DJD (degenerative joint disease) of knee ICD-10-CM: M17.9 ICD-9-CM: 715.36   10/30/2012 - Present Normocytic anemia (Chronic) ICD-10-CM: D64.9 ICD-9-CM: 285.9   5/26/2013 - Present RESOLVED: Acute respiratory failure ([facility] ICD-10-CM: J96.00 
ICD-9-CM: 518.81   7/12/2013 - 7/17/2013 RESOLVED: Pneumonia, organism unspecified ICD-10-CM: J18.9 ICD-9-CM: 048   7/12/2013 - 7/17/2013 RESOLVED: Chest pain, unspecified ICD-10-CM: R07.9 ICD-9-CM: 786.50   7/12/2013 - 7/17/2013  DM (diabetes mellitus), type 2 with renal complications (HCC) (Chronic) ICD-10-CM: E11.29 ICD-9-CM: 250.40   8/9/2013 - Present Vitamin D deficiency ICD-10-CM: E55.9 ICD-9-CM: 268.9   8/9/2013 - Present Angina, class III ([address] ICD-10-CM: I20.9 ICD-9-CM: 413.9   8/9/2013 - Present RESOLVED: Acute pancreatitis ICD-10-CM: K85.90 ICD-9-CM: 401.8   10/10/2013 - 2/22/2014 RESOLVED: Abdominal pain ICD-10-CM: R10.9 ICD-9-CM: 789.00   10/10/2013 - 2/22/2014 RESOLVED: Pneumonia, organism unspecified ICD-10-CM: J18.9 ICD-9-CM: 579   10/10/2013 - 2/22/2014 RESOLVED: Bacteremia ICD-10-CM: R78.81 ICD-9-CM: 790.7   10/14/2013 - 2/22/2014 RESOLVED: Chest pain ICD-10-CM: R07.9 ICD-9-CM: 786.50   11/9/2014 - 11/18/2014 RESOLVED: Wheezing ICD-10-CM: R06.2 ICD-9-CM: 786.07   11/9/2014 - 11/18/2014 RESOLVED: Dyspnea ICD-10-CM: R06.00 
ICD-9-CM: 786.09   11/9/2014 - 11/18/2014 RESOLVED: Tobacco abuse ICD-10-CM: Z72.0 ICD-9-CM: 305.1   11/9/2014 - 6/25/2015 RESOLVED: SIRS (systemic inflammatory response syndrome) (HCC) ICD-10-CM: R65.10 ICD-9-CM: 995.90   11/9/2014 - 11/18/2014 DVT (deep venous thrombosis) (HCC) ICD-10-CM: I82.409 ICD-9-CM: 453.40   2/2/2017 - Present CKD (chronic kidney disease) stage 4, GFR 15-29 ml/min (HCC) (Chronic) ICD-10-CM: N18.4 ICD-9-CM: 585.4   2/10/2017 - Present * (Principal)Acute and chronic respiratory failure with hypoxia [facility]) ICD-10-CM: J96.21 
ICD-9-CM: 518.84, 799.02   2/10/2017 - Present You are allergic to the following Allergen Reactions Contrast Dye (Iodine) Anaphylaxis Levaquin (Levofloxacin) Nausea and Vomiting Morphine Hives Itching Current Discharge Medication List  
  
START taking these medications Dose & Instructions Dispensing Information Comments  
 chlorpheniramine-HYDROcodone 10-8 mg/5 mL suspension Commonly known as:  Mellisa Marcos Dose:  5 mL Take 5 mL by mouth every twelve (12) hours. Max Daily Amount: 10 mL. Quantity:  115 mL Refills:  0 linaclotide 290 mcg Cap capsule Commonly known as:  Alto Longs Dose:  290 mcg Take 1 Cap by mouth Daily (before breakfast) for 10 days. Quantity:  10 Cap Refills:  0  
   
 polyethylene glycol 17 gram packet Commonly known as:  Beola Buffalo Dose:  17 g Take 1 Packet by mouth daily. Quantity:  14 Packet Refills:  0  
   
 senna-docusate 8.6-50 mg per tablet Commonly known as:  Krys Curls Dose:  2 Tab Take 2 Tabs by mouth daily. Quantity:  28 Tab Refills:  0  
   
 warfarin 4 mg tablet Commonly known as:  COUMADIN Dose:  4 mg Take 1 Tab by mouth daily. Quantity:  15 Tab Refills:  0 CONTINUE these medications which have CHANGED Dose & Instructions Dispensing Information Comments  
 bumetanide 1 mg tablet Commonly known as:  Ingrid Rodriguez What changed:   
- medication strength 
- how much to take 
- how to take this - when to take this 
- additional instructions Dose:  1 mg Take 1 Tab by mouth two (2) times a day. Quantity:  60 Tab Refills:  0  
   
 predniSONE 10 mg tablet Commonly known as:  Patricia Menard What changed:   
- how to take this 
- additional instructions Take 30mg for three days then 20mg for three days then 10mg for three days Quantity:  18 Tab Refills:  0 CONTINUE these medications which have NOT CHANGED Dose & Instructions Dispensing Information Comments  
 albuterol 90 mcg/actuation inhaler Commonly known as:  PROVENTIL HFA, VENTOLIN HFA, PROAIR HFA Dose:  2 Puff Take 2 Puffs by inhalation every four (4) hours as needed for Wheezing. Refills:  0  
   
 aspirin 81 mg tablet Dose:  81 mg Take 81 mg by mouth daily. Refills:  0  
   
 atorvastatin 80 mg tablet Commonly known as:  LIPITOR Dose:  80 mg Take 80 mg by mouth every evening. Refills:  0 BACTROBAN 2 % ointment Generic drug:  mupirocin  Apply  to affected area two (2) times daily as needed (lesions on feet when needed). Ointment external two times daily to lesions on feet until healed - now using as needed Refills:  0  
   
 calcitRIOL 0.25 mcg capsule Commonly known as:  ROCALTROL Dose:  0.25 mcg Take 0.25 mcg by mouth two (2) days a week. Patient takes on Monday and Thursday Refills:  0  
   
 CARAFATE 1 gram tablet Generic drug:  sucralfate Dose:  1 g Take 1 g by mouth Before breakfast, lunch, dinner and at bedtime. Refills:  0  
   
 carvedilol 25 mg tablet Commonly known as:  COREG  
 TAKE 1 TABLET BY MOUTH TWICE A DAY Quantity:  180 Tab Refills:  3 DUONEB 2.5 mg-0.5 mg/3 ml Nebu Generic drug:  albuterol-ipratropium Dose:  3 mL  
3 mL by Nebulization route every four (4) hours. Refills:  0  
   
 FLONASE 50 mcg/actuation nasal spray Generic drug:  fluticasone Dose:  2 Spray 2 Sprays by Both Nostrils route nightly. Refills:  0 HUMALOG SC  
 by SubCUTAneous route Before breakfast, lunch, and dinner. Sliding scale. Refills:  0  
   
 isosorbide mononitrate  mg CR tablet Commonly known as:  IMDUR  
 TAKE 1 TABLET BY MOUTH EVERY DAY Quantity:  30 Tab Refills:  5  
   
 nitroglycerin 0.3 mg SL tablet Commonly known as:  NITROSTAT Dose:  0.4 mg  
1 Tab by SubLINGual route every five (5) minutes as needed for Chest Pain. Quantity:  1 Bottle Refills:  1 NORVASC 10 mg tablet Generic drug:  amLODIPine Dose:  10 mg Take 10 mg by mouth daily. Refills:  0  
   
 oxyCODONE-acetaminophen 7.5-325 mg per tablet Commonly known as:  PERCOCET 7.5 Dose:  1 Tab Take 1 Tab by mouth every four (4) hours as needed. Refills:  0  
   
 pantoprazole 40 mg tablet Commonly known as:  PROTONIX Dose:  40 mg Take 1 Tab by mouth daily. Indications: GASTROESOPHAGEAL REFLUX Quantity:  90 Tab Refills:  1 RESTORIL 30 mg capsule Generic drug:  temazepam  
 Dose:  30 mg  
 Take 30 mg by mouth nightly as needed for Sleep. Refills:  0  
   
 TESSALON PERLES 100 mg capsule Generic drug:  benzonatate Dose:  100 mg Take 100 mg by mouth three (3) times daily as needed for Cough. Refills:  0  
   
 VITAMIN D2 50,000 unit capsule Generic drug:  ergocalciferol Dose:  43920 Units Take 50,000 Units by mouth every Tuesday and Thursday. Refills:  0 STOP taking these medications Comments AUGMENTIN 875-125 mg per tablet Generic drug:  amoxicillin-clavulanate CHERATUSSIN -10 mg/5 mL solution Generic drug:  guaiFENesin-codeine  
   
   
 losartan 25 mg tablet Commonly known as:  COZAAR Current Immunizations Name Date Influenza Vaccine (Quad) PF  Deferred () Surgery Information ID Date/Time Status Primary Surgeon All Procedures Location [id number] 2/3/2017 176 Kenyatta Ayala MD BRONCHOSCOPY 
BRONCHIAL ALVEOLAR [facility] ENDOSCOPY Follow-up Information Follow up With Details Comments Contact Info Ilir Pacheco MD In 1 week  [facility] [address] [place] [address] 
[phone] Sienna Ramirez MD In 1 week  23[address] [facility] 1007 [facility] 
[phone] is to replace you Rollator at home. [phone] Discharge Instructions HOSPITALIST DISCHARGE INSTRUCTIONS 
NAME: Jeffrey Chris :  1955 MRN:  [id number] Date/Time:  2/10/2017 11:07 AM 
 
ADMIT DATE: 2017 DISCHARGE DATE: 2/10/2017 ADMITTING DIAGNOSIS: 
Interstitial lung disease DVT (blood clot in the legs) Chronic kidney disease DISCHARGE DIAGNOSIS: 
As above MEDICATIONS: 
 
· It is important that you take the medication exactly as they are prescribed. · Keep your medication in the bottles provided by the pharmacist and keep a list of the medication names, dosages, and times to be taken in your wallet. · Do not take other medications without consulting your doctor. Pain Management: per above medications What to do at Home: 1. Be sure to take your steroids and use your breathing treatments as prescribed. 2.  You have been started on warfarin for a blood clot in your legs. You need to follow up with your new primary care at your scheduled appointment for an INR check. 3.  The dose of your bumex has changed due to your kidney function. You need to follow up with Dr. Rose Hoover within 1-2 weeks. Recommended diet:  Resume previous diet Recommended activity: Activity as tolerated If you experience any of the following symptoms then please call your primary care physician or return to the emergency room if you cannot get hold of your doctor: 
Fever, chills, nausea, vomiting, diarrhea, change in mentation, falling, bleeding, shortness of breath Information obtained by : 
I understand that if any problems occur once I am at home I am to contact my physician. I understand and acknowledge receipt of the instructions indicated above. Physician's or R.N.'s Signature                                                                  Date/Time Patient or Representative Signature                                                          Date/Time Chart Review Routing History Recipient Method Report Sent By Stephanie Butler RN Phone: [phone] In [facility] [[id number]] 3/27/2012 10:58 AM 03/27/2012 Rashaad Cool MD  
Fax: [phone] Phone: [phone]  Fax JUAN PABLOMARCELA WESLEY MD NOTES AUTO ROUTING REPORT Lenchokiran Axel [[id number]] 11/9/2014 3:15 AM 11/09/2014 Brittni Cueto MD  
Fax: [phone] Phone: [phone] Fax [facility] IP MD NOTES AUTO ROUTING REPORT Marilyn Hobbs [[id number]] 11/9/2014  3:19 AM 11/09/2014 Brittni Cueto MD  
Fax: [phone] Phone: [phone] Fax [facility] IP MD NOTES AUTO ROUTING REPORT Marilyn Hobbs [[id number]] 11/9/2014  4:28 AM 11/09/2014 Brittni Cueto MD  
Fax: [phone] Phone: [phone] Fax Андрей Clifford MD NOTES AUTO ROUTING REPORT Brooklyn Frye MD [[id number]] 11/14/2014  8:58 AM 11/14/2014 Brittni Cueto MD  
Fax: [phone] Phone: [phone] Fax Андрей Clifford MD NOTES AUTO ROUTING REPORT Ronaldo Hernandez MD [[id number]] 11/17/2014  3:22 PM 11/17/2014 Brittni Cueto MD  
Fax: [phone] Phone: [phone] Fax [facility] IP MD NOTES AUTO ROUTING REPORT Timothy Varela MD [[id number]] 11/18/2014  3:17 PM 11/18/2014 Brittni Cueto MD  
Fax: [phone] Phone: [phone] Fax IP Auto Routed [phone]  [phone] 11/24/2014  4:47 PM 11/24/2014 Laly Vargas MD  
Fax: [phone] Phone: [phone] Fax IP Auto Routed [phone]  [phone] 11/24/2014  4:47 PM 11/24/2014

## 2017-02-02 NOTE — PROCEDURES
[address]  *** FINAL REPORT ***    Name: Dontrell Machuca  MRN: [id number]    Outpatient  : 14 Dec 1955  HIS Order #: [id number]  [address] [facility] Visit #: [id number]  Date: 2017    TYPE OF TEST: Peripheral Venous Testing    REASON FOR TEST  Edema, Shortness of breath    Right Leg:-  Deep venous thrombosis:           Yes  Proximal extent of thrombus:      Posterior Tibial  Superficial venous thrombosis:    No  Deep venous insufficiency:        No  Superficial venous insufficiency: No    Left Leg:-  Deep venous thrombosis:           No  Superficial venous thrombosis:    No  Deep venous insufficiency:        No  Superficial venous insufficiency: No      INTERPRETATION/FINDINGS  PROCEDURE:  BILATERAL LE VENOUS DUPLEX. Evaluation of lower extremity veins with ultrasound (B-mode imaging,  pulsed Doppler, color Doppler). Includes the common femoral, deep  femoral, femoral, popliteal, posterior tibial, peroneal, and great  saphenous veins. FINDINGS:  In the right lower extremity, 1 of 2 posterior tibial veins   is not compressible with no color flow suggesting occlusive thrombus. Thrombus appears to be indeterminate age. Unable to fully evaluate  the calf veins bilaterally secondary to pateints body habitus. Gray  scale and color flow duplex images of the remaining  veins in both  lower extremities demonstrate normal compressibility, spontaneous and  augmented flow profiles, and absence of filling defects throughout the   deep and superficial veins in both lower extremities. CONCLUSION:  Right lower extremity venous duplex positive for deep  venous thrombosis involving the posterior tibial vein. Left lower  extremity venous duplex negative for deep venous thrombosis or  thrombophlebitis. ADDITIONAL COMMENTS    I have personally reviewed the data relevant to the interpretation of  this  study. TECHNOLOGIST: Manuel Piña RDCS  Signed: 2017 02:25 PM    PHYSICIAN: Jaymie Palacios MD  Signed: 02/03/2017 09:23 AM

## 2017-02-02 NOTE — IP AVS SNAPSHOT
Current Discharge Medication List  
  
Take these medications at their scheduled times Dose & Instructions Dispensing Information Comments Morning Noon Evening Bedtime  
 aspirin 81 mg tablet Your next dose is: Today, Tomorrow Other:  ____________ Dose:  81 mg Take 81 mg by mouth daily. Refills:  0  
     
   
   
   
  
 atorvastatin 80 mg tablet Commonly known as:  LIPITOR Your next dose is: Today, Tomorrow Other:  ____________ Dose:  80 mg Take 80 mg by mouth every evening. Refills:  0  
     
   
   
   
  
 bumetanide 1 mg tablet Commonly known as:  Marisue Days Your next dose is: Today, Tomorrow Other:  ____________ Dose:  1 mg Take 1 Tab by mouth two (2) times a day. Quantity:  60 Tab Refills:  0  
     
   
   
   
  
 calcitRIOL 0.25 mcg capsule Commonly known as:  ROCALTROL Your next dose is: Today, Tomorrow Other:  ____________ Dose:  0.25 mcg Take 0.25 mcg by mouth two (2) days a week. Patient takes on Monday and Thursday Refills:  0  
     
   
   
   
  
 CARAFATE 1 gram tablet Generic drug:  sucralfate Your next dose is: Today, Tomorrow Other:  ____________ Dose:  1 g Take 1 g by mouth Before breakfast, lunch, dinner and at bedtime. Refills:  0  
     
   
   
   
  
 chlorpheniramine-HYDROcodone 10-8 mg/5 mL suspension Commonly known as:  Caryl Ped Your next dose is: Today, Tomorrow Other:  ____________ Dose:  5 mL Take 5 mL by mouth every twelve (12) hours. Max Daily Amount: 10 mL. Quantity:  115 mL Refills:  0 DUONEB 2.5 mg-0.5 mg/3 ml Nebu Generic drug:  albuterol-ipratropium Your next dose is: Today, Tomorrow Other:  ____________ Dose:  3 mL  
3 mL by Nebulization route every four (4) hours. Refills:  0 FLONASE 50 mcg/actuation nasal spray Generic drug:  fluticasone Your next dose is: Today, Tomorrow Other:  ____________ Dose:  2 Spray 2 Sprays by Both Nostrils route nightly. Refills:  0 HUMALOG SC Your next dose is: Today, Tomorrow Other:  ____________  
   
   
 by SubCUTAneous route Before breakfast, lunch, and dinner. Sliding scale. Refills:  0  
     
   
   
   
  
 linaclotide 290 mcg Cap capsule Commonly known as:  Georgianne Both Your next dose is: Today, Tomorrow Other:  ____________ Dose:  290 mcg Take 1 Cap by mouth Daily (before breakfast) for 10 days. Quantity:  10 Cap Refills:  0 NORVASC 10 mg tablet Generic drug:  amLODIPine Your next dose is: Today, Tomorrow Other:  ____________ Dose:  10 mg Take 10 mg by mouth daily. Refills:  0  
     
   
   
   
  
 pantoprazole 40 mg tablet Commonly known as:  PROTONIX Your next dose is: Today, Tomorrow Other:  ____________ Dose:  40 mg Take 1 Tab by mouth daily. Indications: GASTROESOPHAGEAL REFLUX Quantity:  90 Tab Refills:  1  
     
   
   
   
  
 polyethylene glycol 17 gram packet Commonly known as:  Jay Gurinder Your next dose is: Today, Tomorrow Other:  ____________ Dose:  17 g Take 1 Packet by mouth daily. Quantity:  14 Packet Refills:  0  
     
   
   
   
  
 senna-docusate 8.6-50 mg per tablet Commonly known as:  Joelyismael Muster Your next dose is: Today, Tomorrow Other:  ____________ Dose:  2 Tab Take 2 Tabs by mouth daily. Quantity:  28 Tab Refills:  0  
     
   
   
   
  
 VITAMIN D2 50,000 unit capsule Generic drug:  ergocalciferol Your next dose is: Today, Tomorrow Other:  ____________ Dose:  81545 Units Take 50,000 Units by mouth every Tuesday and Thursday. Refills:  0 warfarin 4 mg tablet Commonly known as:  COUMADIN Your next dose is: Today, Tomorrow Other:  ____________ Dose:  4 mg Take 1 Tab by mouth daily. Quantity:  15 Tab Refills:  0 Take these medications as needed Dose & Instructions Dispensing Information Comments Morning Noon Evening Bedtime  
 albuterol 90 mcg/actuation inhaler Commonly known as:  PROVENTIL HFA, VENTOLIN HFA, PROAIR HFA Your next dose is: Today, Tomorrow Other:  ____________ Dose:  2 Puff Take 2 Puffs by inhalation every four (4) hours as needed for Wheezing. Refills:  0 BACTROBAN 2 % ointment Generic drug:  mupirocin Your next dose is: Today, Tomorrow Other:  ____________ Apply  to affected area two (2) times daily as needed (lesions on feet when needed). Ointment external two times daily to lesions on feet until healed - now using as needed Refills:  0  
     
   
   
   
  
 nitroglycerin 0.3 mg SL tablet Commonly known as:  NITROSTAT Your next dose is: Today, Tomorrow Other:  ____________ Dose:  0.4 mg  
1 Tab by SubLINGual route every five (5) minutes as needed for Chest Pain. Quantity:  1 Bottle Refills:  1  
     
   
   
   
  
 oxyCODONE-acetaminophen 7.5-325 mg per tablet Commonly known as:  PERCOCET 7.5 Your next dose is: Today, Tomorrow Other:  ____________ Dose:  1 Tab Take 1 Tab by mouth every four (4) hours as needed. Refills:  0 RESTORIL 30 mg capsule Generic drug:  temazepam  
   
Your next dose is: Today, Tomorrow Other:  ____________ Dose:  30 mg Take 30 mg by mouth nightly as needed for Sleep. Refills:  0  
     
   
   
   
  
 TESSALON PERLES 100 mg capsule Generic drug:  benzonatate Your next dose is: Today, Tomorrow Other:  ____________ Dose:  100 mg Take 100 mg by mouth three (3) times daily as needed for Cough. Refills:  0 Take these medications as directed Dose & Instructions Dispensing Information Comments Morning Noon Evening Bedtime  
 carvedilol 25 mg tablet Commonly known as:  Kathia Vyas Your next dose is: Today, Tomorrow Other:  ____________ TAKE 1 TABLET BY MOUTH TWICE A DAY Quantity:  180 Tab Refills:  3  
     
   
   
   
  
 isosorbide mononitrate  mg CR tablet Commonly known as:  IMDUR Your next dose is: Today, Tomorrow Other:  ____________ TAKE 1 TABLET BY MOUTH EVERY DAY Quantity:  30 Tab Refills:  5  
     
   
   
   
  
 predniSONE 10 mg tablet Commonly known as:  Natasha Cortes Your next dose is: Today, Tomorrow Other:  ____________ Take 30mg for three days then 20mg for three days then 10mg for three days Quantity:  18 Tab Refills:  0 Where to Get Your Medications Information about where to get these medications is not yet available ! Ask your nurse or doctor about these medications  
  bumetanide 1 mg tablet  
 chlorpheniramine-HYDROcodone 10-8 mg/5 mL suspension  
 linaclotide 290 mcg Cap capsule  
 polyethylene glycol 17 gram packet  
 predniSONE 10 mg tablet  
 senna-docusate 8.6-50 mg per tablet  
 warfarin 4 mg tablet

## 2017-02-02 NOTE — H&P
Admission History and Physical      NAME:  Balise Leonardo   :   1955   MRN:  [id number]     PCP:  None     Date/Time:  2017           Assessment/Plan:       Active Problems:      Acute on chronic hypoxemic respiratory failure  /  Interstitial lung disease: unclear precipitant. CT chest with bilateral worsening ground glass opacification. WBC up but on steroids. No fever    -- Admit to medicine  -- Pulmonology consult  -- Hold on ABx for now (has been on FQ, clarithromycin, augmentin, and doxycycline) over last few weeks. Defer to pulm about next option  -- Check s. Pneumo, mycoplasma, and legionella  -- Likely needs bronch  -- Continue steroids at prednisone 40 mg  -- Scheduled duo-nebs    DVT (deep venous thrombosis): in setting of worsening SOB, may also have PE. Allergic to contrast + severe CKD. Has ILD so V/Q not likely to be helpful    -- Heparin gtt  -- Will need warfarin      Chronic diastolic heart failure  / Pulmonary HTN / CAD (coronary Artery Disease) Native Artery: in setting of worsening SOB    -- Update TTE  -- Continue statin, bumex, coreg, imdur, ARB      HTN (hypertension): Continue home anti-hypertensives      Morbid obesity: Counseled on weight loss      CKD (chronic kidney disease) stage 4 /  Normocytic anemia: minimal change in GFR since last visit. Was suppose to get IV iron with [facility] at [address] today. -- Monitor BMP closely  -- Continue on ARB and loop as she was as an outpatient. -- Hold on iron checks or Epo  -- Nephrology consult  -- Renally dose medication      DM (diabetes mellitus), type 2 with renal complications: worsened with steroids. Labile BG    -- Check hgbA1c  -- Appears to be on only sliding scale  -- Continue SSI, DCT assistance in med management  -- FSBG       Subjective:     CHIEF COMPLAINT: SOB     HISTORY OF PRESENT ILLNESS:     Ms. Igor Cruz is a 63 y.o.    female with a hx of ILD, HFpEF, DM2, CKD stage 4 who is admitted with worsening SOB and hypoxemia. Ms. Arturo Hart presented to the Emergency Department today complaining of gradual worsening of SOB on her chronic hypoxemic respiratory failure over the course of 2-3 weeks. Patient was seen in our ED 2 weeks ago and dx with CAP and given levaquin. [facility] few days later for worsen sx. Since that time, she has been on several abx, remained on steroids, has seen her pulmonologist and has not felt any better. In our ED, she had a LE doppler demonstrating a DVT and CT chest showing worsening ground glass infiltrations bilaterally. We will admit for further management.       Past Medical History   Diagnosis Date     Sleep Apnea 2/16/2011    Angina, class III ([address] 8/9/2013    Aortic aneurysm ([address]     CAD (coronary Artery Disease) Native Artery 2/16/2011    CKD (chronic kidney disease) stage 3, GFR 30-59 ml/min 10/5/2012    Diabetic gastroparesis (HCC)     Diastolic heart failure ([address] 10/5/2012    Esophageal stricture 2012     dialted Dr. Kamla Draper G6PD deficiency ([address]      trait    GERD (gastroesophageal reflux disease)     Hypertensive Cardiovasc Dis Benign, No CHF 2/16/2011    ILD (interstitial lung disease) ([address]      open lung bx CJW 2010    OA (osteoarthritis)     Obesity 2/16/2011    Ovarian cancer ([address]      cervical and uterine    Rheumatoid arteritis ([address]     T2DM (type 2 diabetes mellitus) ([address] 8/9/2013    Tobacco use disorder 3/21/2012    Uterine cervix cancer ([address]     Vitamin D deficiency 8/9/2013        Past Surgical History   Procedure Laterality Date    Hx orthopaedic  11/12/12     right knee replacement    Hx hysterectomy      Hx cholecystectomy      Hx appendectomy      Hx hernia repair      Hx carpal tunnel release       bilateral    Hx tonsil and adenoidectomy      Pr cardiac surg procedure unlist       stents    Upper gi endoscopy,demetrius costello guide  6/24/2016          Colonoscopy N/A 6/24/2016     COLONOSCOPY performed by Caitlni TREJO Peterson Schulz MD at [facility] ENDOSCOPY       Social History   Substance Use Topics    Smoking status: Former Smoker     Packs/day: 0.50     Years: 41.00     Types: Cigarettes     Quit date: 11/9/2014    Smokeless tobacco: Never Used    Alcohol use No        Family History   Problem Relation Age of Onset    Heart Disease Mother     Heart Disease Brother         Allergies   Allergen Reactions    Contrast Dye [Iodine] Anaphylaxis    Levaquin [Levofloxacin] Nausea and Vomiting    Morphine Hives and Itching        Prior to Admission medications    Medication Sig Start Date End Date Taking? Authorizing Provider   albuterol-ipratropium (DUONEB) 2.5 mg-0.5 mg/3 ml nebu 3 mL by Nebulization route every four (4) hours. Yes Historical Provider   sucralfate (CARAFATE) 1 gram tablet Take 1 g by mouth Before breakfast, lunch, dinner and at bedtime. Yes Historical Provider   fluticasone (FLONASE) 50 mcg/actuation nasal spray 2 Sprays by Both Nostrils route nightly. Yes Historical Provider   mupirocin (BACTROBAN) 2 % ointment Apply  to affected area two (2) times daily as needed (lesions on feet when needed). Ointment external two times daily to lesions on feet until healed - now using as needed   Yes Historical Provider   temazepam (RESTORIL) 30 mg capsule Take 30 mg by mouth nightly as needed for Sleep. Yes Historical Provider   predniSONE (DELTASONE) 10 mg tablet Take  by mouth. First dose 2/2/17 Prednisone 40mg x 3 days then 30mg x 3 days then 20mg x 3 days then 10mg x 3 days 2/2/17 2/13/17 Yes Historical Provider   amoxicillin-clavulanate (AUGMENTIN) 875-125 mg per tablet Take 1 Tab by mouth every twelve (12) hours. X 10 days started 2/1/17 2/1/17 2/10/17 Yes Historical Provider   albuterol (PROVENTIL HFA, VENTOLIN HFA, PROAIR HFA) 90 mcg/actuation inhaler Take 2 Puffs by inhalation every four (4) hours as needed for Wheezing.    Yes Historical Provider   calcitRIOL (ROCALTROL) 0.25 mcg capsule Take 0.25 mcg by mouth two (2) days a week. Patient takes on Monday and Thursday   Yes Historical Provider   benzonatate (TESSALON PERLES) 100 mg capsule Take 100 mg by mouth three (3) times daily as needed for Cough. Yes Historical Provider   guaiFENesin-codeine (CHERATUSSIN AC) 100-10 mg/5 mL solution Take 5 mL by mouth three (3) times daily as needed for Cough. Yes Historical Provider   isosorbide mononitrate ER (IMDUR) 120 mg CR tablet TAKE 1 TABLET BY MOUTH EVERY DAY 11/21/16  Yes Efren Ambriz MD   bumetanide (BUMEX) 2 mg tablet Take two tablets twice a day. 11/10/16  Yes Efren Ambriz MD   carvedilol (COREG) 25 mg tablet TAKE 1 TABLET BY MOUTH TWICE A DAY 10/16/16  Yes Efren Ambriz MD   losartan (COZAAR) 25 mg tablet TAKE 1 TABLET BY MOUTH ONCE A DAY 9/16/16  Yes Tyra Ross NP   clopidogrel (PLAVIX) 75 mg tablet TAKE 1 TABLET BY MOUTH EVERY DAY 9/8/16  Yes Efren Ambriz MD   pantoprazole (PROTONIX) 40 mg tablet Take 1 Tab by mouth daily. Indications: GASTROESOPHAGEAL REFLUX 6/24/16  Yes Vera Lee MD   nitroglycerin (NITROSTAT) 0.3 mg SL tablet 1 Tab by SubLINGual route every five (5) minutes as needed for Chest Pain. 6/24/16  Yes Efren Ambriz MD   INSULIN LISPRO (HUMALOG SC) by SubCUTAneous route Before breakfast, lunch, and dinner. Sliding scale. Yes Abhijit Frey MD   oxyCODONE-acetaminophen (PERCOCET 7.5) 7.5-325 mg per tablet Take 1 Tab by mouth every four (4) hours as needed. 1/14/16  Yes Historical Provider   amLODIPine (NORVASC) 10 mg tablet Take 10 mg by mouth daily. Yes Historical Provider   ergocalciferol (VITAMIN D2) 50,000 unit capsule Take 50,000 Units by mouth every Tuesday and Thursday. Yes Abhijit Frey MD   aspirin 81 mg tablet Take 81 mg by mouth daily. Yes Abhijit Frey MD   atorvastatin (LIPITOR) 80 mg tablet Take 80 mg by mouth every evening.      Yes Historical Provider         Review of Systems:  (bold if positive, if negative)    Gen:  fatigueEyes:  ENT:  CVS:  Pulm: Cough, dyspnea, sputumwheezingGI:    :    MS:  Skin:  Psych:  Endo:    Hem:  Renal:    Neuro:            Objective:      VITALS:    Vital signs reviewed; most recent are:    Visit Vitals    /56    Pulse 86    Temp 98.2 °F (36.8 °C)    Resp 17    Ht 5' 10\" (1.778 m)    Wt 151.3 kg (333 lb 8.9 oz)    SpO2 100%    BMI 47.86 kg/m2     SpO2 Readings from Last 6 Encounters:   02/02/17 100%   01/25/17 98%   10/14/16 98%   09/13/16 94%   09/08/16 97%   06/24/16 100%    O2 Flow Rate (L/min): 4 l/min   No intake or output data in the 24 hours ending 02/02/17 1846         Exam:     Physical Exam:    Gen:  Well-developed, well-nourished, in mild acute distress  HEENT:  Pink conjunctivae, PERRL, hearing intact to voice, moist mucous membranes  Neck:  Supple, without masses, thyroid non-tender  Resp:  Tripod positioning, leaning forward, increased resp rate, diffuse, extensive wheezing throughout all lung fields. Card:  No murmurs, normal S1, S2 without thrills, bruits or peripheral edema  Abd:  Soft, non-tender, non-distended, normoactive bowel sounds are present  Lymph:  No cervical adenopathy  Musc:  No cyanosis or clubbing  Skin:  No rashes or ulcers, skin turgor is good  Neuro:  Cranial nerves 3-12 are grossly intact,  strength is 5/5 bilaterally, dorsi / plantarflexion strength is 5/5 bilaterally, follows commands appropriately  Psych:  Alert with good insight.   Oriented to person, place, and time       Labs:    Recent Labs      02/02/17   1520   WBC  13.9*   HGB  8.5*   HCT  25.9*   PLT  346     Recent Labs      02/02/17   1520   NA  145   K  3.4*   CL  107   CO2  25   GLU  84   BUN  57*   CREA  3.25*   CA  8.0*   MG  1.2*   ALB  2.7*   TBILI  0.3   SGOT  11*   ALT  17     Lab Results   Component Value Date/Time    Glucose (POC) 136 06/24/2016 12:20 PM    Glucose (POC) 384 07/01/2015 09:56 PM    Glucose (POC) 170 07/21/2013 09:51 PM    Glucose (POC) 153 07/11/2013 11:00 PM     No results for input(s): PH, PCO2, PO2, HCO3, FIO2 in the last 72 hours. Recent Labs      02/02/17   1520   INR  1.1       CT Chest:    IMPRESSION:  1. There are new increased parenchymal opacities identified most pronounced  anteriorly in the right upper lobe but also in the left upper lobe and to a  lesser extent superior segment of the left lower lobe. These findings  demonstrate increased groundglass opacities this could be due to alveolitis this  could be due to mild acute infection. This may merely be progression of the  previous disease as the areas of opacity are in a similar distribution as was  previously noted. 2. Bibasilar honeycombing right greater than left is unchanged as is bibasilar  bronchiectasis. 3. Severe coronary calcification. 4. Enlarged main pulmonary segment suggest pulmonary artery hypertension. 5. Increase in size of pericardial effusion. Clinical correlation is suggested. .      Medical records reviewed in preparation for this admission: Old medical records. Nursing notes.     Surrogate decision maker:  patient and spouse    Total time spent with patient: [phone] [facility] discussed with: Patient, Family, Consultant/Specialist and >50% of time spent in counseling and coordination of care    Discussed:  Care Plan    Prophylaxis:  Heparin gtt    Probable Disposition:  Home w/Family           ___________________________________________________    Attending Physician: Yfn Gomez,

## 2017-02-02 NOTE — ED NOTES
TRANSFER - OUT REPORT:    Verbal report given to Juan Martinez Rn(name) on Shayne Cortés  being transferred to [phone] (unit) for routine progression of care       Report consisted of patients Situation, Background, Assessment and   Recommendations(SBAR). Information from the following report(s) SBAR, ED Summary, [facility] and Recent Results was reviewed with the receiving nurse. Lines:   Peripheral IV 02/02/17 Left Antecubital (Active)   Site Assessment Clean, dry, & intact 2/2/2017  3:24 PM   Phlebitis Assessment 0 2/2/2017  3:24 PM   Infiltration Assessment 0 2/2/2017  3:24 PM   Dressing Status Clean, dry, & intact 2/2/2017  3:24 PM   Dressing Type Transparent 2/2/2017  3:24 PM   Hub Color/Line Status Pink;Flushed 2/2/2017  3:24 PM   Action Taken Blood drawn 2/2/2017  3:24 PM        Opportunity for questions and clarification was provided.       Patient transported with:   O2 @ 4 liters   tech

## 2017-02-02 NOTE — IP AVS SNAPSHOT
[address] 
[phone] Patient: Nallely Curry MRN: [id number] [facility]:50/[id number]/[id number] You are allergic to the following Allergen Reactions Contrast Dye (Iodine) Anaphylaxis Levaquin (Levofloxacin) Nausea and Vomiting Morphine Hives Itching Immunizations Administered for This Admission Name Date Influenza Vaccine (Quad) PF  Deferred () Recent Documentation Height Weight Breastfeeding? BMI OB Status Smoking Status 1.778 m (!) 160.6 kg No 50.8 kg/m2 Hysterectomy Former Smoker Unresulted Labs Order Current Status AFB CULTURE + SMEAR W/RFLX ID FROM CULTURE Preliminary result L PNEUMOPHILA DFA/CULTURE Preliminary result Emergency Contacts Name Discharge Info Relation Home Work Mobile [address] [place] [address] CAREGIVER [3] Spouse [3] [phone] About your hospitalization You were admitted on:  February 2, 2017 You last received care in the:  [facility]  MED SURG 2 You were discharged on:  February 10, 2017 Why you were hospitalized Your primary diagnosis was:  Acute And Chronic Respiratory Failure With Hypoxia (Hcc) Your diagnoses also included:  Interstitial Lung Disease (Hcc), Coronary Atherosclerosis Of Native Coronary Artery, Pulmonary Htn (Hcc), Dm (Diabetes Mellitus), Type 2 With Renal Complications (Hcc), Chronic Diastolic Heart Failure (Hcc), Morbid Obesity (Hcc), Htn (Hypertension), Dvt (Deep Venous Thrombosis) (Hcc), Normocytic Anemia, Ckd (Chronic Kidney Disease) Stage 4, Gfr 15-29 Ml/Min (Hcc) Providers Seen During Your Hospitalizations Provider Role Specialty Primary office phone Gustavo Johnson MD Attending Provider Emergency Medicine [phone] Alex Lees DO Attending Provider Internal Medicine [phone] González Malloy MD Attending Provider Internal Medicine [phone] Jessica Real MD Attending Provider Internal Medicine [phone] Jessica Noriega MD Attending Provider Internal Medicine [phone] Your Primary Care Physician (PCP) Primary Care Physician Office Phone Office Fax NONE ** None ** ** None ** Follow-up Information Follow up With Details Comments Contact Info Angela Whitney MD In 1 week  [address] [facility] [address] [place] [address] 
[phone] Layne Rodriguez MD In 1 week  [address] [place] [address] [facility] 
[phone] [address] is to replace you Rollator at home. [phone] Your Appointments Thursday February 16, 2017 10:45 AM EST New Patient with Skinny Becker, DO [address] [facility] ([facility]) [address] [facility]  
[phone] Current Discharge Medication List  
  
START taking these medications Dose & Instructions Dispensing Information Comments Morning Noon Evening Bedtime  
 chlorpheniramine-HYDROcodone 10-8 mg/5 mL suspension Commonly known as:  Mellisa Marcos Your next dose is: Today, Tomorrow Other:  _________ Dose:  5 mL Take 5 mL by mouth every twelve (12) hours. Max Daily Amount: 10 mL. Quantity:  115 mL Refills:  0  
     
   
   
   
  
 linaclotide 290 mcg Cap capsule Commonly known as:  Everton Beltran Your next dose is: Today, Tomorrow Other:  _________ Dose:  290 mcg Take 1 Cap by mouth Daily (before breakfast) for 10 days. Quantity:  10 Cap Refills:  0  
     
   
   
   
  
 polyethylene glycol 17 gram packet Commonly known as:  Yvonne Jacobo Your next dose is: Today, Tomorrow Other:  _________ Dose:  17 g Take 1 Packet by mouth daily. Quantity:  14 Packet Refills:  0  
     
   
   
   
  
 senna-docusate 8.6-50 mg per tablet Commonly known as:  Aileencheko Anjali Your next dose is: Today, Tomorrow Other:  _________ Dose:  2 Tab Take 2 Tabs by mouth daily. Quantity:  28 Tab Refills:  0  
     
   
   
   
  
 warfarin 4 mg tablet Commonly known as:  COUMADIN Your next dose is: Today, Tomorrow Other:  _________ Dose:  4 mg Take 1 Tab by mouth daily. Quantity:  15 Tab Refills:  0 CONTINUE these medications which have CHANGED Dose & Instructions Dispensing Information Comments Morning Noon Evening Bedtime  
 bumetanide 1 mg tablet Commonly known as:  Rosy Hicks What changed:   
- medication strength 
- how much to take 
- how to take this - when to take this 
- additional instructions Your next dose is: Today, Tomorrow Other:  _________ Dose:  1 mg Take 1 Tab by mouth two (2) times a day. Quantity:  60 Tab Refills:  0  
     
   
   
   
  
 predniSONE 10 mg tablet Commonly known as:  Freeman Hernandez What changed:   
- how to take this 
- additional instructions Your next dose is: Today, Tomorrow Other:  _________ Take 30mg for three days then 20mg for three days then 10mg for three days Quantity:  18 Tab Refills:  0 CONTINUE these medications which have NOT CHANGED Dose & Instructions Dispensing Information Comments Morning Noon Evening Bedtime  
 albuterol 90 mcg/actuation inhaler Commonly known as:  PROVENTIL HFA, VENTOLIN HFA, PROAIR HFA Your next dose is: Today, Tomorrow Other:  _________ Dose:  2 Puff Take 2 Puffs by inhalation every four (4) hours as needed for Wheezing. Refills:  0  
     
   
   
   
  
 aspirin 81 mg tablet Your next dose is: Today, Tomorrow Other:  _________ Dose:  81 mg Take 81 mg by mouth daily. Refills:  0  
     
   
   
   
  
 atorvastatin 80 mg tablet Commonly known as:  LIPITOR Your next dose is: Today, Tomorrow Other:  _________ Dose:  80 mg Take 80 mg by mouth every evening. Refills:  0 BACTROBAN 2 % ointment Generic drug:  mupirocin Your next dose is: Today, Tomorrow Other:  _________ Apply  to affected area two (2) times daily as needed (lesions on feet when needed). Ointment external two times daily to lesions on feet until healed - now using as needed Refills:  0  
     
   
   
   
  
 calcitRIOL 0.25 mcg capsule Commonly known as:  ROCALTROL Your next dose is: Today, Tomorrow Other:  _________ Dose:  0.25 mcg Take 0.25 mcg by mouth two (2) days a week. Patient takes on Monday and Thursday Refills:  0  
     
   
   
   
  
 CARAFATE 1 gram tablet Generic drug:  sucralfate Your next dose is: Today, Tomorrow Other:  _________ Dose:  1 g Take 1 g by mouth Before breakfast, lunch, dinner and at bedtime. Refills:  0  
     
   
   
   
  
 carvedilol 25 mg tablet Commonly known as:  Concetta Spry Your next dose is: Today, Tomorrow Other:  _________ TAKE 1 TABLET BY MOUTH TWICE A DAY Quantity:  180 Tab Refills:  3 DUONEB 2.5 mg-0.5 mg/3 ml Nebu Generic drug:  albuterol-ipratropium Your next dose is: Today, Tomorrow Other:  _________ Dose:  3 mL  
3 mL by Nebulization route every four (4) hours. Refills:  0  
     
   
   
   
  
 FLONASE 50 mcg/actuation nasal spray Generic drug:  fluticasone Your next dose is: Today, Tomorrow Other:  _________ Dose:  2 Spray 2 Sprays by Both Nostrils route nightly. Refills:  0 HUMALOG SC Your next dose is: Today, Tomorrow Other:  _________  
   
   
 by SubCUTAneous route Before breakfast, lunch, and dinner. Sliding scale. Refills:  0 isosorbide mononitrate  mg CR tablet Commonly known as:  IMDUR Your next dose is: Today, Tomorrow Other:  _________ TAKE 1 TABLET BY MOUTH EVERY DAY Quantity:  30 Tab Refills:  5  
     
   
   
   
  
 nitroglycerin 0.3 mg SL tablet Commonly known as:  NITROSTAT Your next dose is: Today, Tomorrow Other:  _________ Dose:  0.4 mg  
1 Tab by SubLINGual route every five (5) minutes as needed for Chest Pain. Quantity:  1 Bottle Refills:  1 NORVASC 10 mg tablet Generic drug:  amLODIPine Your next dose is: Today, Tomorrow Other:  _________ Dose:  10 mg Take 10 mg by mouth daily. Refills:  0  
     
   
   
   
  
 oxyCODONE-acetaminophen 7.5-325 mg per tablet Commonly known as:  PERCOCET 7.5 Your next dose is: Today, Tomorrow Other:  _________ Dose:  1 Tab Take 1 Tab by mouth every four (4) hours as needed. Refills:  0  
     
   
   
   
  
 pantoprazole 40 mg tablet Commonly known as:  PROTONIX Your next dose is: Today, Tomorrow Other:  _________ Dose:  40 mg Take 1 Tab by mouth daily. Indications: GASTROESOPHAGEAL REFLUX Quantity:  90 Tab Refills:  1 RESTORIL 30 mg capsule Generic drug:  temazepam  
   
Your next dose is: Today, Tomorrow Other:  _________ Dose:  30 mg Take 30 mg by mouth nightly as needed for Sleep. Refills:  0  
     
   
   
   
  
 TESSALON PERLES 100 mg capsule Generic drug:  benzonatate Your next dose is: Today, Tomorrow Other:  _________ Dose:  100 mg Take 100 mg by mouth three (3) times daily as needed for Cough. Refills:  0  
     
   
   
   
  
 VITAMIN D2 50,000 unit capsule Generic drug:  ergocalciferol Your next dose is: Today, Tomorrow Other:  _________ Dose:  81549 Units Take 50,000 Units by mouth every Tuesday and Thursday. Refills:  0 STOP taking these medications AUGMENTIN 875-125 mg per tablet Generic drug:  amoxicillin-clavulanate CHERATUSSIN -10 mg/5 mL solution Generic drug:  guaiFENesin-codeine  
   
  
 losartan 25 mg tablet Commonly known as:  COZAAR Where to Get Your Medications Information on where to get these meds will be given to you by the nurse or doctor. ! Ask your nurse or doctor about these medications  
  bumetanide 1 mg tablet  
 chlorpheniramine-HYDROcodone 10-8 mg/5 mL suspension  
 linaclotide 290 mcg Cap capsule  
 polyethylene glycol 17 gram packet  
 predniSONE 10 mg tablet  
 senna-docusate 8.6-50 mg per tablet  
 warfarin 4 mg tablet Discharge Instructions HOSPITALIST DISCHARGE INSTRUCTIONS 
NAME: Jamee Valdez :  1955 MRN:  [id number] Date/Time:  2/10/2017 11:07 AM 
 
ADMIT DATE: 2017 DISCHARGE DATE: 2/10/2017 ADMITTING DIAGNOSIS: 
Interstitial lung disease DVT (blood clot in the legs) Chronic kidney disease DISCHARGE DIAGNOSIS: 
As above MEDICATIONS: 
 
· It is important that you take the medication exactly as they are prescribed. · Keep your medication in the bottles provided by the pharmacist and keep a list of the medication names, dosages, and times to be taken in your wallet. · Do not take other medications without consulting your doctor. Pain Management: per above medications What to do at Home: 1. Be sure to take your steroids and use your breathing treatments as prescribed. 2.  You have been started on warfarin for a blood clot in your legs. You need to follow up with your new primary care at your scheduled appointment for an INR check. 3.  The dose of your bumex has changed due to your kidney function. You need to follow up with Dr. Iraj Serrano within 1-2 weeks. Recommended diet:  Resume previous diet Recommended activity: Activity as tolerated If you experience any of the following symptoms then please call your primary care physician or return to the emergency room if you cannot get hold of your doctor: 
Fever, chills, nausea, vomiting, diarrhea, change in mentation, falling, bleeding, shortness of breath Information obtained by : 
I understand that if any problems occur once I am at home I am to contact my physician. I understand and acknowledge receipt of the instructions indicated above. Physician's or R.N.'s Signature                                                                  Date/Time Patient or Representative Signature                                                          Date/Time Discharge Instructions Attachments/References DVT (DEEP VEIN THROMBOSIS) (ENGLISH) WARFARIN (BY MOUTH) (ENGLISH) WARFARIN SAFETY TIPS (ENGLISH) INTERSTITIAL LUNG DISEASE (ENGLISH) CONSTIPATION (ENGLISH) Discharge Orders None General Information Please provide this summary of care documentation to your next provider. Introducing [facility] [place] [facility]! Dear Eugene Forbes: Thank you for requesting a [employer] account. Our records indicate that you already have an active [employer] account. You can access your account anytime at [email or web address] Did you know that you can access your hospital and ER discharge instructions at any time in [employer]? You can also review all of your test results from your hospital stay or ER visit. Additional Information If you have questions, please visit the Frequently Asked Questions section of the [employer] website at [email or web address] Remember, [employer] is NOT to be used for urgent needs. For medical emergencies, dial 911. Now available from your iPhone and Android! Patient Signature:  ____________________________________________________________ Date:  ____________________________________________________________  
  
Maritza Read Provider Signature:  ____________________________________________________________ Date:  ____________________________________________________________ More Information Deep Vein Thrombosis: Care Instructions Your Care Instructions A deep vein thrombosis (DVT) is a blood clot in certain veins of the legs, pelvis, or arms. Blood clots in these veins need to be treated because they can get bigger, break loose, and travel through the bloodstream to the lungs. A blood clot in a lung can be life-threatening. The doctor may have given you a blood thinner (anticoagulant). A blood thinner can stop the blood clot from growing larger and prevent new clots from forming. You will need to take a blood thinner for 3 to 6 months or longer. The doctor has checked you carefully, but problems can develop later. If you notice any problems or new symptoms, get medical treatment right away. Follow-up care is a key part of your treatment and safety. Be sure to make and go to all appointments, and call your doctor if you are having problems. It's also a good idea to know your test results and keep a list of the medicines you take. How can you care for yourself at home? · Take your medicines exactly as prescribed. Call your doctor if you think you are having a problem with your medicine. · If you are taking a blood thinner, be sure you get instructions about how to take your medicine safely. Blood thinners can cause serious bleeding problems. · Wear compression stockings if your doctor recommends them.  These stockings are tighter at the feet than on the legs. They may reduce pain and swelling in your legs. You can get them with a prescription at a medical supply store or at some drugstores. · When you sit, use a pillow to raise the arm or leg that has the blood clot. Try to keep it above the level of your heart. When should you call for help? Call 911 anytime you think you may need emergency care. For example, call if: 
· You passed out (lost consciousness). · You have symptoms of a blood clot in your lung (called a pulmonary embolism). These include: 
¨ Sudden chest pain. ¨ Trouble breathing. ¨ Coughing up blood. Call your doctor now or seek immediate medical care if: 
· You have new or worse trouble breathing. · You are dizzy or lightheaded, or you feel like you may faint. · You have symptoms of a blood clot in your arm or leg. These may include: 
¨ Pain in the arm, calf, back of the knee, thigh, or groin. ¨ Redness and swelling in the arm, leg, or groin. Watch closely for changes in your health, and be sure to contact your doctor if: 
· You do not get better as expected. Where can you learn more? Go to http://lindsay-kai.info/. Enter Q731 in the search box to learn more about \"Deep Vein Thrombosis: Care Instructions. \" Current as of: June 4, 2016 Content Version: 11.1 © 4408-3659 [place]. Care instructions adapted under license by [employer] (which disclaims liability or warranty for this information). If you have questions about a medical condition or this instruction, always ask your healthcare professional. [place] [address] any warranty or liability for your use of this information. Warfarin (By mouth) Warfarin (WAR-far-in) Prevents and treats blood clots. May lower the risk of serious complications after a heart attack. This medicine is a blood thinner. Brand Name(s):CoumadinSukh and Ethan There may be other brand names for this medicine. When This Medicine Should Not Be Used: This medicine is not right for everyone. Do not use it if you had an allergic reaction to warfarin, if you are pregnant, or if you have health problems that could cause bleeding. How to Use This Medicine:  
Tablet · Take your medicine as directed. Your dose may need to be changed several times to find what works best for you. · This medicine should come with a Medication Guide. Ask your pharmacist for a copy if you do not have one. · Missed dose: Take a dose as soon as you remember. If it is almost time for your next dose, wait until then and take a regular dose. Do not take extra medicine to make up for a missed dose. · Store the medicine in a closed container at room temperature, away from heat, moisture, and direct light. Drugs and Foods to Avoid: Ask your doctor or pharmacist before using any other medicine, including over-the-counter medicines, vitamins, and herbal products. · Many medicines and foods can affect how warfarin works and may affect the PT/INR test results. Tell your doctor before you start or stop any medicine, especially the following: ¨ Ginkgo, echinacea, goldenseal, or Cathi's wort ¨ Another blood thinner, including apixaban, argatroban, bivalirudin, cilostazol, clopidogrel, dabigatran, desirudin, dipyridamole, heparin, lepirudin, prasugrel, rivaroxaban, ticlopidine ¨ Medicine to treat depression or anxiety, including citalopram, desvenlafaxine, duloxetine, escitalopram, fluoxetine, fluvoxamine, milnacipran, paroxetine, sertraline, venlafaxine, vilazodone ¨ Medicine to treat an infection ¨ NSAID pain or arthritis medicine, including aspirin, celecoxib, diclofenac, diflunisal, fenoprofen, ibuprofen, indomethacin, ketoprofen, ketorolac, mefenamic acid, naproxen, oxaprozin, piroxicam, sulindac. Check labels for over-the-counter medicines to find out if they contain an NSAID. ¨ Steroid medicine, including dexamethasone, hydrocortisone, methylprednisolone, prednisolone, prednisone · Warfarin works best if you eat about the same amount of vitamin K every day. Foods high in vitamin K include asparagus, broccoli, brussels sprouts, cabbage, green leafy vegetables, plums, rhubarb, and canola oil. Talk to your doctor before you make changes to your normal diet. Warnings While Using This Medicine: · It is not safe to take this medicine during pregnancy. It could harm an unborn baby. Tell your doctor right away if you become pregnant. Use an effective form of birth control to keep from getting pregnant during treatment and for at least 1 month after your last dose. · Tell your doctor if you are breastfeeding, or if you have kidney disease, liver disease, diabetes, heart disease, heart failure, high blood pressure, an infection, a stomach ulcer, or protein C deficiency. Also tell your doctor if you had recent surgery or an injury, or a history of stroke, anemia, severe bleeding or bruising, or problems caused by heparin use. · This medicine may cause the following problems: ¨ Bleeding, which may be life-threatening ¨ Gangrene (skin or tissue damage) ¨ Purple toes syndrome · You must have a PT/INR blood test while you use this medicine to check how well your blood is clotting. Your doctor will use the test results to make sure the medicine is working properly. Keep all appointments for the PT/INR blood tests. · You may bleed or bruise more easily with warfarin. To prevent injury or cuts, do not play rough sports, be careful with sharp objects, and brush and floss your teeth gently. Teola Valarie your nose gently, and do not pick your nose. · Carry an ID card or wear a medical alert bracelet to let emergency caregivers know that you use warfarin. · Tell any doctor or dentist who treats you that you are using this medicine.  You may need to stop using this medicine several days before you have surgery or medical tests. · Keep all medicine out of the reach of children. Never share your medicine with anyone. Possible Side Effects While Using This Medicine:  
Call your doctor right away if you notice any of these side effects: · Allergic reaction: Itching or hives, swelling in your face or hands, swelling or tingling in your mouth or throat, chest tightness, trouble breathing · Bleeding from your gums or nose, coughing up blood, heavy monthly periods · Bleeding that does not stop, bruising, or weakness · Dizziness, fainting, or lightheadedness · Pain, brown or black skin, or skin that is cool to the touch · Purple toes or feet, or new pain in your leg, foot, or toes · Red or dark brown urine, or red or black, tarry stools · Vomiting blood or material that looks like coffee grounds If you notice other side effects that you think are caused by this medicine, tell your doctor. Call your doctor for medical advice about side effects. You may report side effects to FDA at 4-211-SBS-7748 © 2016 [address] is for End User's use only and may not be sold, redistributed or otherwise used for commercial purposes. The above information is an  only. It is not intended as medical advice for individual conditions or treatments. Talk to your doctor, nurse or pharmacist before following any medical regimen to see if it is safe and effective for you. Taking Warfarin Safely: Care Instructions Your Care Instructions Warfarin is a medicine that you take to prevent blood clots. It is often called a blood thinner. Doctors give warfarin (such as Coumadin) to reduce the risk of blood clots. You may be at risk for blood clots if you have atrial fibrillation or deep vein thrombosis. Some other health problems may also put you at risk. Warfarin slows the amount of time it takes for your blood to clot.  It can cause bleeding problems. Even if you've been taking warfarin for a while, it's important to know how to take it safely. Foods and other medicines can affect the way warfarin works. Some can make warfarin work too well. This can cause bleeding problems. And some can make it work poorly, so that it does not prevent blood clots very well. You will need regular blood tests to check how long it takes for your blood to form a clot. This test is called a PT or prothrombin time test. The result of the test is called an INR level. Depending on the test results, your doctor or anticoagulation clinic may adjust your dose of warfarin. Follow-up care is a key part of your treatment and safety. Be sure to make and go to all appointments, and call your doctor if you are having problems. It's also a good idea to know your test results and keep a list of the medicines you take. How can you care for yourself at home? Take warfarin safely · Take your warfarin at the same time each day. · If you miss a dose of warfarin, don't take an extra dose to make up for it. Your doctor can tell you exactly what to do so you don't take too much or too little. · Wear medical alert jewelry that lets others know that you take warfarin. You can buy this at most drugstores. · Don't take warfarin if you are pregnant or planning to get pregnant. Talk to your doctor about how you can prevent getting pregnant while you are taking it. · Don't change your dose or stop taking warfarin unless your doctor tells you to. Effects of medicines and food on warfarin · Don't start or stop taking any medicines, vitamins, or natural remedies unless you first talk to your doctor. Many medicines can affect how warfarin works. These include aspirin and other pain relievers, over-the-counter medicines, multivitamins, dietary supplements, and herbal products. · Tell all of your doctors and pharmacists that you take warfarin.  Some prescription medicines can affect how warfarin works. · Keep the amount of vitamin K in your diet about the same from day to day. Do not suddenly eat a lot more or a lot less food that is rich in vitamin K than you usually do. Vitamin K affects how warfarin works and how your blood clots. Talk with your doctor before making big changes in your diet. Vitamin K is in many foods, such as: 
¨ Leafy greens, such as kale, cabbage, spinach, Swiss chard, and lettuce. ¨ Canola and soybean oils. ¨ Green vegetables, such as asparagus, broccoli, and [place] sprouts. ¨ Vegetable drinks, green tea leaves, and some dietary supplement drinks. · Avoid cranberry juice and other cranberry products. They can increase the effects of warfarin. · Limit your use of alcohol. Avoid bleeding by preventing falls and injuries · Wear slippers or shoes with nonskid soles. · Remove throw rugs and clutter. · Rearrange furniture and electrical cords to keep them out of walking paths. · Keep stairways, porches, and outside walkways well lit. Use night-lights in hallways and bathrooms. · Be extra careful when you work with sharp tools or knives. When should you call for help? Call 911 anytime you think you may need emergency care. For example, call if: 
· You have a sudden, severe headache that is different from past headaches. Call your doctor now or seek immediate medical care if: 
· You have any abnormal bleeding, such as: 
¨ Nosebleeds. ¨ Vaginal bleeding that is different (heavier, more frequent, at a different time of the month) than what you are used to. ¨ Bloody or black stools, or rectal bleeding. ¨ Bloody or pink urine. Watch closely for changes in your health, and be sure to contact your doctor if you have any problems. Where can you learn more? Go to http://lindsay-kai.info/. Enter M041 in the search box to learn more about \"Taking Warfarin Safely: Care Instructions. \" 
 Current as of: January 27, 2016 Content Version: 11.1 © 5815-7899 [id number]. Care instructions adapted under license by [employer] (which disclaims liability or warranty for this information). If you have questions about a medical condition or this instruction, always ask your healthcare professional. [address] any warranty or liability for your use of this information. Interstitial Lung Disease: Care Instructions Your Care Instructions Interstitial lung disease is a long-term (chronic) lung disease. It happens because of damage between the air sacs in the lung. The damage scars the lung and causes breathing problems. People with interstitial lung disease get breathless during exercise and may have a dry cough. These problems may get worse slowly or very quickly. Interstitial lung disease can be caused by breathing in dust from asbestos and silica. It also can be caused by infections and some medicines. Sometimes doctors cannot find the cause. You may get medicine to treat the problem. Corticosteroids can sometimes reduce the swelling of lung tissue and prevent more damage. Oxygen treatment may help your condition. Follow-up care is a key part of your treatment and safety. Be sure to make and go to all appointments, and call your doctor if you are having problems. Its also a good idea to know your test results and keep a list of the medicines you take. How can you care for yourself at home? · Do not smoke. Smoking makes interstitial lung disease worse. If you need help quitting, talk to your doctor about stop-smoking programs and medicines. These can increase your chances of quitting for good. · Take your medicines exactly as prescribed. Call your doctor if you have any problems with your medicine. · Get flu and pneumococcal shots. These help prevent lung infection.  
· Make an exercise plan with help from your doctor or other health professional. Exercise can help you breathe more easily. · Think about joining a support group. This can help you cope with problems caused by interstitial lung disease. When should you call for help? Call your doctor now or seek immediate medical care if: 
· Your shortness of breath gets worse. · You cough up blood. · You have severe chest pain. Watch closely for changes in your health, and be sure to contact your doctor if you have any problems. Where can you learn more? Go to http://lindsay-kai.info/. Enter S767 in the search box to learn more about \"Interstitial Lung Disease: Care Instructions. \" Current as of: May 23, 2016 Content Version: 11.1 © 6850-7868 [employer]. Care instructions adapted under license by [employer] (which disclaims liability or warranty for this information). If you have questions about a medical condition or this instruction, always ask your healthcare professional. [place] [address] any warranty or liability for your use of this information. Constipation: Care Instructions Your Care Instructions Constipation means that you have a hard time passing stools (bowel movements). People pass stools from 3 times a day to once every 3 days. What is normal for you may be different. Constipation may occur with pain in the rectum and cramping. The pain may get worse when you try to pass stools. Sometimes there are small amounts of bright red blood on toilet paper or the surface of stools. This is because of enlarged veins near the rectum (hemorrhoids). A few changes in your diet and lifestyle may help you avoid ongoing constipation. Your doctor may also prescribe medicine to help loosen your stool. Some medicines can cause constipation. These include pain medicines and antidepressants. Tell your doctor about all the medicines you take. Your doctor may want to make a medicine change to ease your symptoms. Follow-up care is a key part of your treatment and safety. Be sure to make and go to all appointments, and call your doctor if you are having problems. It's also a good idea to know your test results and keep a list of the medicines you take. How can you care for yourself at home? · Drink plenty of fluids, enough so that your urine is light yellow or clear like water. If you have kidney, heart, or liver disease and have to limit fluids, talk with your doctor before you increase the amount of fluids you drink. · Include high-fiber foods in your diet each day. These include fruits, vegetables, beans, and whole grains. · Get at least 30 minutes of exercise on most days of the week. Walking is a good choice. You also may want to do other activities, such as running, swimming, cycling, or playing tennis or team sports. · Take a fiber supplement, such as Citrucel or Metamucil, every day. Read and follow all instructions on the label. · Schedule time each day for a bowel movement. A daily routine may help. Take your time having your bowel movement. · Support your feet with a small step stool when you sit on the toilet. This helps flex your hips and places your pelvis in a squatting position. · Your doctor may recommend an over-the-counter laxative to relieve your constipation. Examples are Milk of Magnesia and MiraLax. Read and follow all instructions on the label. Do not use laxatives on a long-term basis. When should you call for help? Call your doctor now or seek immediate medical care if: 
· You have new or worse belly pain. · You have new or worse nausea or vomiting. · You have blood in your stools. Watch closely for changes in your health, and be sure to contact your doctor if: 
· Your constipation is getting worse. · You do not get better as expected. Where can you learn more? Go to http://lindsay-kai.info/.  
Enter [phone] in the search box to learn more about \"Constipation: Care Instructions. \" Current as of: May 27, 2016 Content Version: 11.1 © 5631-2106 [employer], [facility]. Care instructions adapted under license by [employer] (which disclaims liability or warranty for this information). If you have questions about a medical condition or this instruction, always ask your healthcare professional. [place] [address] any warranty or liability for your use of this information.

## 2017-02-02 NOTE — IP AVS SNAPSHOT
Randy Sevilla 
 
 
 [address] 
[phone] Patient: Lee Carter MRN: [id number] DSE:[id number] You are allergic to the following Allergen Reactions Contrast Dye (Iodine) Anaphylaxis Levaquin (Levofloxacin) Nausea and Vomiting Morphine Hives Itching Immunizations Administered for This Admission Name Date Influenza Vaccine (Quad) PF  Deferred () Recent Documentation Height Weight Breastfeeding? BMI OB Status Smoking Status 1.778 m (!) 160.6 kg No 50.8 kg/m2 Hysterectomy Former Smoker Unresulted Labs Order Current Status AFB CULTURE + SMEAR W/RFLX ID FROM CULTURE Preliminary result L PNEUMOPHILA DFA/CULTURE Preliminary result Emergency Contacts Name Discharge Info Relation Home Work Mobile [address] [place] [address] CAREGIVER [3] Spouse [3] [phone] About your hospitalization You were admitted on:  February 2, 2017 You last received care in the:  [facility]  MED SURG 2 You were discharged on:  February 10, 2017 Unit phone number:  [phone] Why you were hospitalized Your primary diagnosis was:  Acute And Chronic Respiratory Failure With Hypoxia (Hcc) Your diagnoses also included:  Interstitial Lung Disease (Hcc), Coronary Atherosclerosis Of Native Coronary Artery, Pulmonary Htn (Hcc), Dm (Diabetes Mellitus), Type 2 With Renal Complications (Hcc), Chronic Diastolic Heart Failure (Hcc), Morbid Obesity (Hcc), Htn (Hypertension), Dvt (Deep Venous Thrombosis) (Hcc), Normocytic Anemia, Ckd (Chronic Kidney Disease) Stage 4, Gfr 15-29 Ml/Min (Hcc) Providers Seen During Your Hospitalizations Provider Role Specialty Primary office phone Maddy Trinidad MD Attending Provider Emergency Medicine [phone] Yfn Gomez,  Attending Provider Internal Medicine [phone] Elton Dominguez MD Attending Provider Internal Medicine [phone] Liam Bateman MD Attending Provider Internal Medicine [phone] Abiodun Hoover MD Attending Provider Internal Medicine [phone] Your Primary Care Physician (PCP) Primary Care Physician Office Phone Office Fax NONE ** None ** ** None ** Follow-up Information Follow up With Details Comments Contact Info Nilsa Sanabria MD In 1 week  [address] [facility] [address] [place] [address] 
[phone] Emilee Arredondo MD In 1 week  [address] [facility] [address] [facility] 
[phone] [address] is to replace you Rollator at home. [phone] Your Appointments Thursday February 16, 2017 10:45 AM EST New Patient with [employer], DO [address] [facility] ([address]) [address] [address] [place] [address]  
[phone] Current Discharge Medication List  
  
START taking these medications Dose & Instructions Dispensing Information Comments Morning Noon Evening Bedtime  
 chlorpheniramine-HYDROcodone 10-8 mg/5 mL suspension Commonly known as:  Porfiiro Farfan Your next dose is: Today, Tomorrow Other:  _________ Dose:  5 mL Take 5 mL by mouth every twelve (12) hours. Max Daily Amount: 10 mL. Quantity:  115 mL Refills:  0  
     
   
   
   
  
 linaclotide 290 mcg Cap capsule Commonly known as:  Mel Genta Your next dose is: Today, Tomorrow Other:  _________ Dose:  290 mcg Take 1 Cap by mouth Daily (before breakfast) for 10 days. Quantity:  10 Cap Refills:  0  
     
   
   
   
  
 polyethylene glycol 17 gram packet Commonly known as:  Hari Christensen Your next dose is: Today, Tomorrow Other:  _________ Dose:  17 g Take 1 Packet by mouth daily. Quantity:  14 Packet Refills:  0  
     
   
   
   
  
 senna-docusate 8.6-50 mg per tablet Commonly known as:  Dany Lopez Your next dose is: Today, Tomorrow Other:  _________ Dose:  2 Tab Take 2 Tabs by mouth daily. Quantity:  28 Tab Refills:  0  
     
   
   
   
  
 warfarin 4 mg tablet Commonly known as:  COUMADIN Your next dose is: Today, Tomorrow Other:  _________ Dose:  4 mg Take 1 Tab by mouth daily. Quantity:  15 Tab Refills:  0 CONTINUE these medications which have CHANGED Dose & Instructions Dispensing Information Comments Morning Noon Evening Bedtime  
 bumetanide 1 mg tablet Commonly known as:  Ray Ferrara What changed:   
- medication strength 
- how much to take 
- how to take this - when to take this 
- additional instructions Your next dose is: Today, Tomorrow Other:  _________ Dose:  1 mg Take 1 Tab by mouth two (2) times a day. Quantity:  60 Tab Refills:  0  
     
   
   
   
  
 predniSONE 10 mg tablet Commonly known as:  Merle Jose What changed:   
- how to take this 
- additional instructions Your next dose is: Today, Tomorrow Other:  _________ Take 30mg for three days then 20mg for three days then 10mg for three days Quantity:  18 Tab Refills:  0 CONTINUE these medications which have NOT CHANGED Dose & Instructions Dispensing Information Comments Morning Noon Evening Bedtime  
 albuterol 90 mcg/actuation inhaler Commonly known as:  PROVENTIL HFA, VENTOLIN HFA, PROAIR HFA Your next dose is: Today, Tomorrow Other:  _________ Dose:  2 Puff Take 2 Puffs by inhalation every four (4) hours as needed for Wheezing. Refills:  0  
     
   
   
   
  
 aspirin 81 mg tablet Your next dose is: Today, Tomorrow Other:  _________ Dose:  81 mg Take 81 mg by mouth daily. Refills:  0  
     
   
   
   
  
 atorvastatin 80 mg tablet Commonly known as:  LIPITOR Your next dose is: Today, Tomorrow Other:  _________ Dose:  80 mg Take 80 mg by mouth every evening. Refills:  0 BACTROBAN 2 % ointment Generic drug:  mupirocin Your next dose is: Today, Tomorrow Other:  _________ Apply  to affected area two (2) times daily as needed (lesions on feet when needed). Ointment external two times daily to lesions on feet until healed - now using as needed Refills:  0  
     
   
   
   
  
 calcitRIOL 0.25 mcg capsule Commonly known as:  ROCALTROL Your next dose is: Today, Tomorrow Other:  _________ Dose:  0.25 mcg Take 0.25 mcg by mouth two (2) days a week. Patient takes on Monday and Thursday Refills:  0  
     
   
   
   
  
 CARAFATE 1 gram tablet Generic drug:  sucralfate Your next dose is: Today, Tomorrow Other:  _________ Dose:  1 g Take 1 g by mouth Before breakfast, lunch, dinner and at bedtime. Refills:  0  
     
   
   
   
  
 carvedilol 25 mg tablet Commonly known as:  Kathia Locket Your next dose is: Today, Tomorrow Other:  _________ TAKE 1 TABLET BY MOUTH TWICE A DAY Quantity:  180 Tab Refills:  3 DUONEB 2.5 mg-0.5 mg/3 ml Nebu Generic drug:  albuterol-ipratropium Your next dose is: Today, Tomorrow Other:  _________ Dose:  3 mL  
3 mL by Nebulization route every four (4) hours. Refills:  0  
     
   
   
   
  
 FLONASE 50 mcg/actuation nasal spray Generic drug:  fluticasone Your next dose is: Today, Tomorrow Other:  _________ Dose:  2 Spray 2 Sprays by Both Nostrils route nightly. Refills:  0 HUMALOG SC Your next dose is: Today, Tomorrow Other:  _________  
   
   
 by SubCUTAneous route Before breakfast, lunch, and dinner. Sliding scale. Refills:  0 isosorbide mononitrate  mg CR tablet Commonly known as:  IMDUR Your next dose is: Today, Tomorrow Other:  _________ TAKE 1 TABLET BY MOUTH EVERY DAY Quantity:  30 Tab Refills:  5  
     
   
   
   
  
 nitroglycerin 0.3 mg SL tablet Commonly known as:  NITROSTAT Your next dose is: Today, Tomorrow Other:  _________ Dose:  0.4 mg  
1 Tab by SubLINGual route every five (5) minutes as needed for Chest Pain. Quantity:  1 Bottle Refills:  1 NORVASC 10 mg tablet Generic drug:  amLODIPine Your next dose is: Today, Tomorrow Other:  _________ Dose:  10 mg Take 10 mg by mouth daily. Refills:  0  
     
   
   
   
  
 oxyCODONE-acetaminophen 7.5-325 mg per tablet Commonly known as:  PERCOCET 7.5 Your next dose is: Today, Tomorrow Other:  _________ Dose:  1 Tab Take 1 Tab by mouth every four (4) hours as needed. Refills:  0  
     
   
   
   
  
 pantoprazole 40 mg tablet Commonly known as:  PROTONIX Your next dose is: Today, Tomorrow Other:  _________ Dose:  40 mg Take 1 Tab by mouth daily. Indications: GASTROESOPHAGEAL REFLUX Quantity:  90 Tab Refills:  1 RESTORIL 30 mg capsule Generic drug:  temazepam  
   
Your next dose is: Today, Tomorrow Other:  _________ Dose:  30 mg Take 30 mg by mouth nightly as needed for Sleep. Refills:  0  
     
   
   
   
  
 TESSALON PERLES 100 mg capsule Generic drug:  benzonatate Your next dose is: Today, Tomorrow Other:  _________ Dose:  100 mg Take 100 mg by mouth three (3) times daily as needed for Cough. Refills:  0  
     
   
   
   
  
 VITAMIN D2 50,000 unit capsule Generic drug:  ergocalciferol Your next dose is: Today, Tomorrow Other:  _________ Dose:  86092 Units Take 50,000 Units by mouth every Tuesday and Thursday. Refills:  0 STOP taking these medications AUGMENTIN 875-125 mg per tablet Generic drug:  amoxicillin-clavulanate CHERATUSSIN -10 mg/5 mL solution Generic drug:  guaiFENesin-codeine  
   
  
 losartan 25 mg tablet Commonly known as:  COZAAR Where to Get Your Medications Information on where to get these meds will be given to you by the nurse or doctor. ! Ask your nurse or doctor about these medications  
  bumetanide 1 mg tablet  
 chlorpheniramine-HYDROcodone 10-8 mg/5 mL suspension  
 linaclotide 290 mcg Cap capsule  
 polyethylene glycol 17 gram packet  
 predniSONE 10 mg tablet  
 senna-docusate 8.6-50 mg per tablet  
 warfarin 4 mg tablet Discharge Instructions HOSPITALIST DISCHARGE INSTRUCTIONS 
NAME: Sherine Maloney :  1955 MRN:  [id number] Date/Time:  2/10/2017 11:07 AM 
 
ADMIT DATE: 2017 DISCHARGE DATE: 2/10/2017 ADMITTING DIAGNOSIS: 
Interstitial lung disease DVT (blood clot in the legs) Chronic kidney disease DISCHARGE DIAGNOSIS: 
As above MEDICATIONS: 
 
· It is important that you take the medication exactly as they are prescribed. · Keep your medication in the bottles provided by the pharmacist and keep a list of the medication names, dosages, and times to be taken in your wallet. · Do not take other medications without consulting your doctor. Pain Management: per above medications What to do at Home: 1. Be sure to take your steroids and use your breathing treatments as prescribed. 2.  You have been started on warfarin for a blood clot in your legs. You need to follow up with your new primary care at your scheduled appointment for an INR check. 3.  The dose of your bumex has changed due to your kidney function. You need to follow up with Dr. Jessica Salas within 1-2 weeks. Recommended diet:  Resume previous diet Recommended activity: Activity as tolerated If you experience any of the following symptoms then please call your primary care physician or return to the emergency room if you cannot get hold of your doctor: 
Fever, chills, nausea, vomiting, diarrhea, change in mentation, falling, bleeding, shortness of breath Information obtained by : 
I understand that if any problems occur once I am at home I am to contact my physician. I understand and acknowledge receipt of the instructions indicated above. Physician's or R.N.'s Signature                                                                  Date/Time Patient or Representative Signature                                                          Date/Time Discharge Instructions Attachments/References DVT (DEEP VEIN THROMBOSIS) (ENGLISH) WARFARIN (BY MOUTH) (ENGLISH) WARFARIN SAFETY TIPS (ENGLISH) INTERSTITIAL LUNG DISEASE (ENGLISH) CONSTIPATION (ENGLISH) Discharge Orders None [employer] Announcement We are excited to announce that we are making your provider's discharge notes available to you in [employer]. You will see these notes when they are completed and signed by the physician that discharged you from your recent hospital stay. If you have any questions or concerns about any information you see in [employer], please call the Health Information Department where you were seen or reach out to your Primary Care Provider for more information about your plan of care. Introducing [facility]! Dear Brendan Montes: Thank you for requesting a [employer] account.   Our records indicate that you already have an active [employer] account. You can access your account anytime at [email or web address] Did you know that you can access your hospital and ER discharge instructions at any time in [employer]? You can also review all of your test results from your hospital stay or ER visit. Additional Information If you have questions, please visit the Frequently Asked Questions section of the [employer] website at [email or web address] Remember, [employer] is NOT to be used for urgent needs. For medical emergencies, dial 911. Now available from your iPhone and Android! General Information Please provide this summary of care documentation to your next provider. Patient Signature:  ____________________________________________________________ Date:  ____________________________________________________________  
  
Charleesph Perfect Provider Signature:  ____________________________________________________________ Date:  ____________________________________________________________ More Information Deep Vein Thrombosis: Care Instructions Your Care Instructions A deep vein thrombosis (DVT) is a blood clot in certain veins of the legs, pelvis, or arms. Blood clots in these veins need to be treated because they can get bigger, break loose, and travel through the bloodstream to the lungs. A blood clot in a lung can be life-threatening. The doctor may have given you a blood thinner (anticoagulant). A blood thinner can stop the blood clot from growing larger and prevent new clots from forming. You will need to take a blood thinner for 3 to 6 months or longer. The doctor has checked you carefully, but problems can develop later. If you notice any problems or new symptoms, get medical treatment right away. Follow-up care is a key part of your treatment and safety.  Be sure to make and go to all appointments, and call your doctor if you are having problems. It's also a good idea to know your test results and keep a list of the medicines you take. How can you care for yourself at home? · Take your medicines exactly as prescribed. Call your doctor if you think you are having a problem with your medicine. · If you are taking a blood thinner, be sure you get instructions about how to take your medicine safely. Blood thinners can cause serious bleeding problems. · Wear compression stockings if your doctor recommends them. These stockings are tighter at the feet than on the legs. They may reduce pain and swelling in your legs. You can get them with a prescription at a medical supply store or at some drugstores. · When you sit, use a pillow to raise the arm or leg that has the blood clot. Try to keep it above the level of your heart. When should you call for help? Call 911 anytime you think you may need emergency care. For example, call if: 
· You passed out (lost consciousness). · You have symptoms of a blood clot in your lung (called a pulmonary embolism). These include: 
¨ Sudden chest pain. ¨ Trouble breathing. ¨ Coughing up blood. Call your doctor now or seek immediate medical care if: 
· You have new or worse trouble breathing. · You are dizzy or lightheaded, or you feel like you may faint. · You have symptoms of a blood clot in your arm or leg. These may include: 
¨ Pain in the arm, calf, back of the knee, thigh, or groin. ¨ Redness and swelling in the arm, leg, or groin. Watch closely for changes in your health, and be sure to contact your doctor if: 
· You do not get better as expected. Where can you learn more? Go to http://lindsay-kai.info/. Enter W279 in the search box to learn more about \"Deep Vein Thrombosis: Care Instructions. \" Current as of: June 4, 2016 Content Version: 11.1 © 1276-1640 [employer]. Care instructions adapted under license by [employer] (which disclaims liability or warranty for this information). If you have questions about a medical condition or this instruction, always ask your healthcare professional. [address] any warranty or liability for your use of this information. Warfarin (By mouth) Warfarin (WAR-far-in) Prevents and treats blood clots. May lower the risk of serious complications after a heart attack. This medicine is a blood thinner. Brand Name(s):Coumadin, [employer] There may be other brand names for this medicine. When This Medicine Should Not Be Used: This medicine is not right for everyone. Do not use it if you had an allergic reaction to warfarin, if you are pregnant, or if you have health problems that could cause bleeding. How to Use This Medicine:  
Tablet · Take your medicine as directed. Your dose may need to be changed several times to find what works best for you. · This medicine should come with a Medication Guide. Ask your pharmacist for a copy if you do not have one. · Missed dose: Take a dose as soon as you remember. If it is almost time for your next dose, wait until then and take a regular dose. Do not take extra medicine to make up for a missed dose. · Store the medicine in a closed container at room temperature, away from heat, moisture, and direct light. Drugs and Foods to Avoid: Ask your doctor or pharmacist before using any other medicine, including over-the-counter medicines, vitamins, and herbal products. · Many medicines and foods can affect how warfarin works and may affect the PT/INR test results. Tell your doctor before you start or stop any medicine, especially the following: ¨ Ginkgo, echinacea, goldenseal, or Cathi's wort ¨ Another blood thinner, including apixaban, argatroban, bivalirudin, cilostazol, clopidogrel, dabigatran, desirudin, dipyridamole, heparin, lepirudin, prasugrel, rivaroxaban, ticlopidine ¨ Medicine to treat depression or anxiety, including citalopram, desvenlafaxine, duloxetine, escitalopram, fluoxetine, fluvoxamine, milnacipran, paroxetine, sertraline, venlafaxine, vilazodone ¨ Medicine to treat an infection ¨ NSAID pain or arthritis medicine, including aspirin, celecoxib, diclofenac, diflunisal, fenoprofen, ibuprofen, indomethacin, ketoprofen, ketorolac, mefenamic acid, naproxen, oxaprozin, piroxicam, sulindac. Check labels for over-the-counter medicines to find out if they contain an NSAID. ¨ Steroid medicine, including dexamethasone, hydrocortisone, methylprednisolone, prednisolone, prednisone · Warfarin works best if you eat about the same amount of vitamin K every day. Foods high in vitamin K include asparagus, broccoli, brussels sprouts, cabbage, green leafy vegetables, plums, rhubarb, and canola oil. Talk to your doctor before you make changes to your normal diet. Warnings While Using This Medicine: · It is not safe to take this medicine during pregnancy. It could harm an unborn baby. Tell your doctor right away if you become pregnant. Use an effective form of birth control to keep from getting pregnant during treatment and for at least 1 month after your last dose. · Tell your doctor if you are breastfeeding, or if you have kidney disease, liver disease, diabetes, heart disease, heart failure, high blood pressure, an infection, a stomach ulcer, or protein C deficiency. Also tell your doctor if you had recent surgery or an injury, or a history of stroke, anemia, severe bleeding or bruising, or problems caused by heparin use. · This medicine may cause the following problems: ¨ Bleeding, which may be life-threatening ¨ Gangrene (skin or tissue damage) ¨ Purple toes syndrome · You must have a PT/INR blood test while you use this medicine to check how well your blood is clotting. Your doctor will use the test results to make sure the medicine is working properly. Keep all appointments for the PT/INR blood tests. · You may bleed or bruise more easily with warfarin. To prevent injury or cuts, do not play rough sports, be careful with sharp objects, and brush and floss your teeth gently. Cheikh Maizes your nose gently, and do not pick your nose. · Carry an ID card or wear a medical alert bracelet to let emergency caregivers know that you use warfarin. · Tell any doctor or dentist who treats you that you are using this medicine. You may need to stop using this medicine several days before you have surgery or medical tests. · Keep all medicine out of the reach of children. Never share your medicine with anyone. Possible Side Effects While Using This Medicine:  
Call your doctor right away if you notice any of these side effects: · Allergic reaction: Itching or hives, swelling in your face or hands, swelling or tingling in your mouth or throat, chest tightness, trouble breathing · Bleeding from your gums or nose, coughing up blood, heavy monthly periods · Bleeding that does not stop, bruising, or weakness · Dizziness, fainting, or lightheadedness · Pain, brown or black skin, or skin that is cool to the touch · Purple toes or feet, or new pain in your leg, foot, or toes · Red or dark brown urine, or red or black, tarry stools · Vomiting blood or material that looks like coffee grounds If you notice other side effects that you think are caused by this medicine, tell your doctor. Call your doctor for medical advice about side effects. You may report side effects to FDA at 9-565-FDA-9407 © 2016 [address] is for End User's use only and may not be sold, redistributed or otherwise used for commercial purposes. The above information is an  only.  It is not intended as medical advice for individual conditions or treatments. Talk to your doctor, nurse or pharmacist before following any medical regimen to see if it is safe and effective for you. Taking Warfarin Safely: Care Instructions Your Care Instructions Warfarin is a medicine that you take to prevent blood clots. It is often called a blood thinner. Doctors give warfarin (such as Coumadin) to reduce the risk of blood clots. You may be at risk for blood clots if you have atrial fibrillation or deep vein thrombosis. Some other health problems may also put you at risk. Warfarin slows the amount of time it takes for your blood to clot. It can cause bleeding problems. Even if you've been taking warfarin for a while, it's important to know how to take it safely. Foods and other medicines can affect the way warfarin works. Some can make warfarin work too well. This can cause bleeding problems. And some can make it work poorly, so that it does not prevent blood clots very well. You will need regular blood tests to check how long it takes for your blood to form a clot. This test is called a PT or prothrombin time test. The result of the test is called an INR level. Depending on the test results, your doctor or anticoagulation clinic may adjust your dose of warfarin. Follow-up care is a key part of your treatment and safety. Be sure to make and go to all appointments, and call your doctor if you are having problems. It's also a good idea to know your test results and keep a list of the medicines you take. How can you care for yourself at home? Take warfarin safely · Take your warfarin at the same time each day. · If you miss a dose of warfarin, don't take an extra dose to make up for it. Your doctor can tell you exactly what to do so you don't take too much or too little. · Wear medical alert jewelry that lets others know that you take warfarin. You can buy this at most drugstores. · Don't take warfarin if you are pregnant or planning to get pregnant. Talk to your doctor about how you can prevent getting pregnant while you are taking it. · Don't change your dose or stop taking warfarin unless your doctor tells you to. Effects of medicines and food on warfarin · Don't start or stop taking any medicines, vitamins, or natural remedies unless you first talk to your doctor. Many medicines can affect how warfarin works. These include aspirin and other pain relievers, over-the-counter medicines, multivitamins, dietary supplements, and herbal products. · Tell all of your doctors and pharmacists that you take warfarin. Some prescription medicines can affect how warfarin works. · Keep the amount of vitamin K in your diet about the same from day to day. Do not suddenly eat a lot more or a lot less food that is rich in vitamin K than you usually do. Vitamin K affects how warfarin works and how your blood clots. Talk with your doctor before making big changes in your diet. Vitamin K is in many foods, such as: 
¨ Leafy greens, such as kale, cabbage, spinach, Swiss chard, and lettuce. ¨ Canola and soybean oils. ¨ Green vegetables, such as asparagus, broccoli, and [place] sprouts. ¨ Vegetable drinks, green tea leaves, and some dietary supplement drinks. · Avoid cranberry juice and other cranberry products. They can increase the effects of warfarin. · Limit your use of alcohol. Avoid bleeding by preventing falls and injuries · Wear slippers or shoes with nonskid soles. · Remove throw rugs and clutter. · Rearrange furniture and electrical cords to keep them out of walking paths. · Keep stairways, porches, and outside walkways well lit. Use night-lights in hallways and bathrooms. · Be extra careful when you work with sharp tools or knives. When should you call for help? Call 911 anytime you think you may need emergency care. For example, call if: · You have a sudden, severe headache that is different from past headaches. Call your doctor now or seek immediate medical care if: 
· You have any abnormal bleeding, such as: 
¨ Nosebleeds. ¨ Vaginal bleeding that is different (heavier, more frequent, at a different time of the month) than what you are used to. ¨ Bloody or black stools, or rectal bleeding. ¨ Bloody or pink urine. Watch closely for changes in your health, and be sure to contact your doctor if you have any problems. Where can you learn more? Go to http://lindsay-kai.info/. Enter C949 in the search box to learn more about \"Taking Warfarin Safely: Care Instructions. \" Current as of: January 27, 2016 Content Version: 11.1 © 5053-9112 [employer]. Care instructions adapted under license by [employer] (which disclaims liability or warranty for this information). If you have questions about a medical condition or this instruction, always ask your healthcare professional. [place] [address] any warranty or liability for your use of this information. Interstitial Lung Disease: Care Instructions Your Care Instructions Interstitial lung disease is a long-term (chronic) lung disease. It happens because of damage between the air sacs in the lung. The damage scars the lung and causes breathing problems. People with interstitial lung disease get breathless during exercise and may have a dry cough. These problems may get worse slowly or very quickly. Interstitial lung disease can be caused by breathing in dust from asbestos and silica. It also can be caused by infections and some medicines. Sometimes doctors cannot find the cause. You may get medicine to treat the problem. Corticosteroids can sometimes reduce the swelling of lung tissue and prevent more damage. Oxygen treatment may help your condition. Follow-up care is a key part of your treatment and safety.  Be sure to make and go to all appointments, and call your doctor if you are having problems. Its also a good idea to know your test results and keep a list of the medicines you take. How can you care for yourself at home? · Do not smoke. Smoking makes interstitial lung disease worse. If you need help quitting, talk to your doctor about stop-smoking programs and medicines. These can increase your chances of quitting for good. · Take your medicines exactly as prescribed. Call your doctor if you have any problems with your medicine. · Get flu and pneumococcal shots. These help prevent lung infection. · Make an exercise plan with help from your doctor or other health professional. Exercise can help you breathe more easily. · Think about joining a support group. This can help you cope with problems caused by interstitial lung disease. When should you call for help? Call your doctor now or seek immediate medical care if: 
· Your shortness of breath gets worse. · You cough up blood. · You have severe chest pain. Watch closely for changes in your health, and be sure to contact your doctor if you have any problems. Where can you learn more? Go to http://lindsay-kai.info/. Enter D134 in the search box to learn more about \"Interstitial Lung Disease: Care Instructions. \" Current as of: May 23, 2016 Content Version: 11.1 © 2843-9265 [employer]. Care instructions adapted under license by [employer] (which disclaims liability or warranty for this information). If you have questions about a medical condition or this instruction, always ask your healthcare professional. [place] [address] any warranty or liability for your use of this information. Constipation: Care Instructions Your Care Instructions Constipation means that you have a hard time passing stools (bowel movements). People pass stools from 3 times a day to once every 3 days. What is normal for you may be different. Constipation may occur with pain in the rectum and cramping. The pain may get worse when you try to pass stools. Sometimes there are small amounts of bright red blood on toilet paper or the surface of stools. This is because of enlarged veins near the rectum (hemorrhoids). A few changes in your diet and lifestyle may help you avoid ongoing constipation. Your doctor may also prescribe medicine to help loosen your stool. Some medicines can cause constipation. These include pain medicines and antidepressants. Tell your doctor about all the medicines you take. Your doctor may want to make a medicine change to ease your symptoms. Follow-up care is a key part of your treatment and safety. Be sure to make and go to all appointments, and call your doctor if you are having problems. It's also a good idea to know your test results and keep a list of the medicines you take. How can you care for yourself at home? · Drink plenty of fluids, enough so that your urine is light yellow or clear like water. If you have kidney, heart, or liver disease and have to limit fluids, talk with your doctor before you increase the amount of fluids you drink. · Include high-fiber foods in your diet each day. These include fruits, vegetables, beans, and whole grains. · Get at least 30 minutes of exercise on most days of the week. Walking is a good choice. You also may want to do other activities, such as running, swimming, cycling, or playing tennis or team sports. · Take a fiber supplement, such as Citrucel or Metamucil, every day. Read and follow all instructions on the label. · Schedule time each day for a bowel movement. A daily routine may help. Take your time having your bowel movement. · Support your feet with a small step stool when you sit on the toilet. This helps flex your hips and places your pelvis in a squatting position. · Your doctor may recommend an over-the-counter laxative to relieve your constipation. Examples are Milk of Magnesia and MiraLax. Read and follow all instructions on the label. Do not use laxatives on a long-term basis. When should you call for help? Call your doctor now or seek immediate medical care if: 
· You have new or worse belly pain. · You have new or worse nausea or vomiting. · You have blood in your stools. Watch closely for changes in your health, and be sure to contact your doctor if: 
· Your constipation is getting worse. · You do not get better as expected. Where can you learn more? Go to http://lindsay-kai.info/. Enter [id number] in the search box to learn more about \"Constipation: Care Instructions. \" Current as of: May 27, 2016 Content Version: 11.1 © 5844-7195 [employer]. Care instructions adapted under license by [employer] (which disclaims liability or warranty for this information). If you have questions about a medical condition or this instruction, always ask your healthcare professional. [address] any warranty or liability for your use of this information.

## 2017-02-03 ENCOUNTER — SURGERY (OUTPATIENT)
Age: 62
End: 2017-02-03

## 2017-02-03 ENCOUNTER — ANESTHESIA (OUTPATIENT)
Dept: ENDOSCOPY | Age: 62
DRG: 189 | End: 2017-02-03
Payer: MEDICARE

## 2017-02-03 ENCOUNTER — ANESTHESIA EVENT (OUTPATIENT)
Dept: ENDOSCOPY | Age: 62
DRG: 189 | End: 2017-02-03
Payer: MEDICARE

## 2017-02-03 LAB
ANION GAP BLD CALC-SCNC: 12 MMOL/L (ref 5–15)
APPEARANCE UR: CLEAR
APTT PPP: 128.6 SEC (ref 22.1–32.5)
APTT PPP: 45.3 SEC (ref 22.1–32.5)
APTT PPP: 47.5 SEC (ref 22.1–32.5)
APTT PPP: 50.9 SEC (ref 22.1–32.5)
ATRIAL RATE: 80 BPM
BACTERIA URNS QL MICRO: NEGATIVE /HPF
BASOPHILS # BLD AUTO: 0 K/UL (ref 0–0.1)
BASOPHILS # BLD: 0 % (ref 0–1)
BILIRUB UR QL: NEGATIVE
BUN SERPL-MCNC: 60 MG/DL (ref 6–20)
BUN/CREAT SERPL: 18 (ref 12–20)
CALCIUM SERPL-MCNC: 8 MG/DL (ref 8.5–10.1)
CALCULATED P AXIS, ECG09: 49 DEGREES
CALCULATED R AXIS, ECG10: 0 DEGREES
CALCULATED T AXIS, ECG11: 42 DEGREES
CHLORIDE SERPL-SCNC: 104 MMOL/L (ref 97–108)
CO2 SERPL-SCNC: 24 MMOL/L (ref 21–32)
COLOR UR: ABNORMAL
CREAT SERPL-MCNC: 3.31 MG/DL (ref 0.55–1.02)
DIAGNOSIS, 93000: NORMAL
EOSINOPHIL # BLD: 0 K/UL (ref 0–0.4)
EOSINOPHIL NFR BLD: 0 % (ref 0–7)
EPITH CASTS URNS QL MICRO: ABNORMAL /LPF
ERYTHROCYTE [DISTWIDTH] IN BLOOD BY AUTOMATED COUNT: 14.3 % (ref 11.5–14.5)
EST. AVERAGE GLUCOSE BLD GHB EST-MCNC: 143 MG/DL
GLUCOSE BLD STRIP.AUTO-MCNC: 137 MG/DL (ref 65–100)
GLUCOSE BLD STRIP.AUTO-MCNC: 163 MG/DL (ref 65–100)
GLUCOSE BLD STRIP.AUTO-MCNC: 165 MG/DL (ref 65–100)
GLUCOSE BLD STRIP.AUTO-MCNC: 188 MG/DL (ref 65–100)
GLUCOSE BLD STRIP.AUTO-MCNC: 365 MG/DL (ref 65–100)
GLUCOSE SERPL-MCNC: 187 MG/DL (ref 65–100)
GLUCOSE UR STRIP.AUTO-MCNC: NEGATIVE MG/DL
HBA1C MFR BLD: 6.6 % (ref 4.2–6.3)
HCT VFR BLD AUTO: 24.2 % (ref 35–47)
HGB BLD-MCNC: 7.9 G/DL (ref 11.5–16)
HGB UR QL STRIP: NEGATIVE
HYALINE CASTS URNS QL MICRO: ABNORMAL /LPF (ref 0–5)
INR PPP: 1.1 (ref 0.9–1.1)
KETONES UR QL STRIP.AUTO: NEGATIVE MG/DL
LEUKOCYTE ESTERASE UR QL STRIP.AUTO: NEGATIVE
LYMPHOCYTES # BLD AUTO: 19 % (ref 12–49)
LYMPHOCYTES # BLD: 2.4 K/UL (ref 0.8–3.5)
MAGNESIUM SERPL-MCNC: 1.2 MG/DL (ref 1.6–2.4)
MCH RBC QN AUTO: 29.5 PG (ref 26–34)
MCHC RBC AUTO-ENTMCNC: 32.6 G/DL (ref 30–36.5)
MCV RBC AUTO: 90.3 FL (ref 80–99)
MONOCYTES # BLD: 0.8 K/UL (ref 0–1)
MONOCYTES NFR BLD AUTO: 6 % (ref 5–13)
NEUTS SEG # BLD: 9.1 K/UL (ref 1.8–8)
NEUTS SEG NFR BLD AUTO: 75 % (ref 32–75)
NITRITE UR QL STRIP.AUTO: NEGATIVE
P-R INTERVAL, ECG05: 168 MS
PH UR STRIP: 5 [PH] (ref 5–8)
PLATELET # BLD AUTO: 314 K/UL (ref 150–400)
POTASSIUM SERPL-SCNC: 3.2 MMOL/L (ref 3.5–5.1)
PROT UR STRIP-MCNC: 100 MG/DL
PROTHROMBIN TIME: 11 SEC (ref 9–11.1)
Q-T INTERVAL, ECG07: 400 MS
QRS DURATION, ECG06: 74 MS
QTC CALCULATION (BEZET), ECG08: 461 MS
RBC # BLD AUTO: 2.68 M/UL (ref 3.8–5.2)
RBC #/AREA URNS HPF: ABNORMAL /HPF (ref 0–5)
SERVICE CMNT-IMP: ABNORMAL
SODIUM SERPL-SCNC: 140 MMOL/L (ref 136–145)
SP GR UR REFRACTOMETRY: 1.01 (ref 1–1.03)
THERAPEUTIC RANGE,PTTT: ABNORMAL SECS (ref 58–77)
TROPONIN I SERPL-MCNC: <0.04 NG/ML
UROBILINOGEN UR QL STRIP.AUTO: 0.2 EU/DL (ref 0.2–1)
VENTRICULAR RATE, ECG03: 80 BPM
WBC # BLD AUTO: 12.3 K/UL (ref 3.6–11)
WBC URNS QL MICRO: ABNORMAL /HPF (ref 0–4)

## 2017-02-03 PROCEDURE — 87106 FUNGI IDENTIFICATION YEAST: CPT | Performed by: INTERNAL MEDICINE

## 2017-02-03 PROCEDURE — 74011000258 HC RX REV CODE- 258: Performed by: INTERNAL MEDICINE

## 2017-02-03 PROCEDURE — 87899 AGENT NOS ASSAY W/OPTIC: CPT | Performed by: INTERNAL MEDICINE

## 2017-02-03 PROCEDURE — 74011000250 HC RX REV CODE- 250: Performed by: INTERNAL MEDICINE

## 2017-02-03 PROCEDURE — 87205 SMEAR GRAM STAIN: CPT | Performed by: INTERNAL MEDICINE

## 2017-02-03 PROCEDURE — 86738 MYCOPLASMA ANTIBODY: CPT | Performed by: INTERNAL MEDICINE

## 2017-02-03 PROCEDURE — 74011636637 HC RX REV CODE- 636/637: Performed by: INTERNAL MEDICINE

## 2017-02-03 PROCEDURE — 82962 GLUCOSE BLOOD TEST: CPT

## 2017-02-03 PROCEDURE — 80048 BASIC METABOLIC PNL TOTAL CA: CPT | Performed by: INTERNAL MEDICINE

## 2017-02-03 PROCEDURE — 65270000029 HC RM PRIVATE

## 2017-02-03 PROCEDURE — 77010033678 HC OXYGEN DAILY

## 2017-02-03 PROCEDURE — 74011250636 HC RX REV CODE- 250/636

## 2017-02-03 PROCEDURE — 84484 ASSAY OF TROPONIN QUANT: CPT | Performed by: INTERNAL MEDICINE

## 2017-02-03 PROCEDURE — 77030013140 HC MSK NEB VYRM -A: Performed by: INTERNAL MEDICINE

## 2017-02-03 PROCEDURE — 76450000000

## 2017-02-03 PROCEDURE — 87278 LEGION PNEUMOPHILIA AG IF: CPT | Performed by: INTERNAL MEDICINE

## 2017-02-03 PROCEDURE — 74011250637 HC RX REV CODE- 250/637: Performed by: INTERNAL MEDICINE

## 2017-02-03 PROCEDURE — 87088 URINE BACTERIA CULTURE: CPT | Performed by: INTERNAL MEDICINE

## 2017-02-03 PROCEDURE — 36415 COLL VENOUS BLD VENIPUNCTURE: CPT | Performed by: INTERNAL MEDICINE

## 2017-02-03 PROCEDURE — 87252 VIRUS INOCULATION TISSUE: CPT | Performed by: INTERNAL MEDICINE

## 2017-02-03 PROCEDURE — 94640 AIRWAY INHALATION TREATMENT: CPT

## 2017-02-03 PROCEDURE — 88112 CYTOPATH CELL ENHANCE TECH: CPT | Performed by: INTERNAL MEDICINE

## 2017-02-03 PROCEDURE — 81001 URINALYSIS AUTO W/SCOPE: CPT | Performed by: PHYSICIAN ASSISTANT

## 2017-02-03 PROCEDURE — 88312 SPECIAL STAINS GROUP 1: CPT | Performed by: INTERNAL MEDICINE

## 2017-02-03 PROCEDURE — 83036 HEMOGLOBIN GLYCOSYLATED A1C: CPT | Performed by: INTERNAL MEDICINE

## 2017-02-03 PROCEDURE — 97535 SELF CARE MNGMENT TRAINING: CPT

## 2017-02-03 PROCEDURE — 88305 TISSUE EXAM BY PATHOLOGIST: CPT | Performed by: INTERNAL MEDICINE

## 2017-02-03 PROCEDURE — 0B948ZX DRAINAGE OF RIGHT UPPER LOBE BRONCHUS, VIA NATURAL OR ARTIFICIAL OPENING ENDOSCOPIC, DIAGNOSTIC: ICD-10-PCS | Performed by: INTERNAL MEDICINE

## 2017-02-03 PROCEDURE — 83735 ASSAY OF MAGNESIUM: CPT | Performed by: INTERNAL MEDICINE

## 2017-02-03 PROCEDURE — 85610 PROTHROMBIN TIME: CPT | Performed by: INTERNAL MEDICINE

## 2017-02-03 PROCEDURE — 85730 THROMBOPLASTIN TIME PARTIAL: CPT | Performed by: INTERNAL MEDICINE

## 2017-02-03 PROCEDURE — 87449 NOS EACH ORGANISM AG IA: CPT | Performed by: INTERNAL MEDICINE

## 2017-02-03 PROCEDURE — 97165 OT EVAL LOW COMPLEX 30 MIN: CPT

## 2017-02-03 PROCEDURE — 76060000031 HC ANESTHESIA FIRST 0.5 HR: Performed by: INTERNAL MEDICINE

## 2017-02-03 PROCEDURE — 97162 PT EVAL MOD COMPLEX 30 MIN: CPT

## 2017-02-03 PROCEDURE — 85025 COMPLETE CBC W/AUTO DIFF WBC: CPT | Performed by: INTERNAL MEDICINE

## 2017-02-03 PROCEDURE — 87116 MYCOBACTERIA CULTURE: CPT | Performed by: INTERNAL MEDICINE

## 2017-02-03 PROCEDURE — 97530 THERAPEUTIC ACTIVITIES: CPT

## 2017-02-03 PROCEDURE — 74011250636 HC RX REV CODE- 250/636: Performed by: INTERNAL MEDICINE

## 2017-02-03 PROCEDURE — 77030022556 HC FCPS BIOP TIS ENDOSC BSC -B: Performed by: INTERNAL MEDICINE

## 2017-02-03 PROCEDURE — 74011250636 HC RX REV CODE- 250/636: Performed by: PHYSICIAN ASSISTANT

## 2017-02-03 PROCEDURE — 87102 FUNGUS ISOLATION CULTURE: CPT | Performed by: INTERNAL MEDICINE

## 2017-02-03 PROCEDURE — 76040000019: Performed by: INTERNAL MEDICINE

## 2017-02-03 RX ORDER — HEPARIN SODIUM 5000 [USP'U]/ML
5000 INJECTION, SOLUTION INTRAVENOUS; SUBCUTANEOUS ONCE
Status: COMPLETED | OUTPATIENT
Start: 2017-02-03 | End: 2017-02-03

## 2017-02-03 RX ORDER — DEXTROSE MONOHYDRATE AND SODIUM CHLORIDE 5; .9 G/100ML; G/100ML
50 INJECTION, SOLUTION INTRAVENOUS CONTINUOUS
Status: DISCONTINUED | OUTPATIENT
Start: 2017-02-03 | End: 2017-02-05

## 2017-02-03 RX ORDER — MAGNESIUM SULFATE 1 G/100ML
1 INJECTION INTRAVENOUS ONCE
Status: COMPLETED | OUTPATIENT
Start: 2017-02-03 | End: 2017-02-03

## 2017-02-03 RX ORDER — POTASSIUM CHLORIDE 1.5 G/1.77G
40 POWDER, FOR SOLUTION ORAL
Status: COMPLETED | OUTPATIENT
Start: 2017-02-03 | End: 2017-02-03

## 2017-02-03 RX ORDER — LIDOCAINE HYDROCHLORIDE 20 MG/ML
JELLY TOPICAL ONCE
Status: COMPLETED | OUTPATIENT
Start: 2017-02-03 | End: 2017-02-03

## 2017-02-03 RX ORDER — MAGNESIUM SULFATE HEPTAHYDRATE 40 MG/ML
2 INJECTION, SOLUTION INTRAVENOUS ONCE
Status: COMPLETED | OUTPATIENT
Start: 2017-02-03 | End: 2017-02-03

## 2017-02-03 RX ORDER — AMOXICILLIN 250 MG
2 CAPSULE ORAL DAILY
Status: DISCONTINUED | OUTPATIENT
Start: 2017-02-04 | End: 2017-02-10 | Stop reason: HOSPADM

## 2017-02-03 RX ORDER — SODIUM CHLORIDE 9 MG/ML
INJECTION, SOLUTION INTRAVENOUS
Status: DISCONTINUED | OUTPATIENT
Start: 2017-02-03 | End: 2017-02-03 | Stop reason: HOSPADM

## 2017-02-03 RX ORDER — LIDOCAINE HYDROCHLORIDE 20 MG/ML
5 SOLUTION OROPHARYNGEAL ONCE
Status: COMPLETED | OUTPATIENT
Start: 2017-02-03 | End: 2017-02-03

## 2017-02-03 RX ORDER — BUMETANIDE 1 MG/1
1 TABLET ORAL DAILY
Status: DISCONTINUED | OUTPATIENT
Start: 2017-02-04 | End: 2017-02-04

## 2017-02-03 RX ORDER — PROPOFOL 10 MG/ML
INJECTION, EMULSION INTRAVENOUS AS NEEDED
Status: DISCONTINUED | OUTPATIENT
Start: 2017-02-03 | End: 2017-02-03 | Stop reason: HOSPADM

## 2017-02-03 RX ORDER — LIDOCAINE HYDROCHLORIDE 20 MG/ML
1200 INJECTION, SOLUTION INFILTRATION; PERINEURAL
Status: DISCONTINUED | OUTPATIENT
Start: 2017-02-03 | End: 2017-02-03

## 2017-02-03 RX ORDER — PROPOFOL 10 MG/ML
INJECTION, EMULSION INTRAVENOUS
Status: DISCONTINUED | OUTPATIENT
Start: 2017-02-03 | End: 2017-02-03 | Stop reason: HOSPADM

## 2017-02-03 RX ORDER — ACETYLCYSTEINE 200 MG/ML
600 SOLUTION ORAL; RESPIRATORY (INHALATION) ONCE
Status: DISCONTINUED | OUTPATIENT
Start: 2017-02-03 | End: 2017-02-03 | Stop reason: HOSPADM

## 2017-02-03 RX ORDER — SODIUM CHLORIDE 0.9 % (FLUSH) 0.9 %
5-10 SYRINGE (ML) INJECTION AS NEEDED
Status: DISCONTINUED | OUTPATIENT
Start: 2017-02-03 | End: 2017-02-10 | Stop reason: HOSPADM

## 2017-02-03 RX ORDER — OXYCODONE HYDROCHLORIDE 5 MG/1
10 TABLET ORAL
Status: DISCONTINUED | OUTPATIENT
Start: 2017-02-03 | End: 2017-02-10 | Stop reason: HOSPADM

## 2017-02-03 RX ORDER — DEXTROSE MONOHYDRATE AND SODIUM CHLORIDE 5; .9 G/100ML; G/100ML
50 INJECTION, SOLUTION INTRAVENOUS CONTINUOUS
Status: DISCONTINUED | OUTPATIENT
Start: 2017-02-03 | End: 2017-02-03

## 2017-02-03 RX ORDER — LIDOCAINE HYDROCHLORIDE 40 MG/ML
5 SOLUTION TOPICAL ONCE
Status: COMPLETED | OUTPATIENT
Start: 2017-02-03 | End: 2017-02-03

## 2017-02-03 RX ORDER — MIDAZOLAM HYDROCHLORIDE 1 MG/ML
INJECTION, SOLUTION INTRAMUSCULAR; INTRAVENOUS AS NEEDED
Status: DISCONTINUED | OUTPATIENT
Start: 2017-02-03 | End: 2017-02-03 | Stop reason: HOSPADM

## 2017-02-03 RX ORDER — WARFARIN SODIUM 5 MG/1
5 TABLET ORAL ONCE
Status: COMPLETED | OUTPATIENT
Start: 2017-02-03 | End: 2017-02-03

## 2017-02-03 RX ORDER — LIDOCAINE HYDROCHLORIDE AND EPINEPHRINE 20; 10 MG/ML; UG/ML
5 INJECTION, SOLUTION INFILTRATION; PERINEURAL AS NEEDED
Status: DISCONTINUED | OUTPATIENT
Start: 2017-02-03 | End: 2017-02-03

## 2017-02-03 RX ORDER — POLYETHYLENE GLYCOL 3350 17 G/17G
17 POWDER, FOR SOLUTION ORAL DAILY
Status: DISCONTINUED | OUTPATIENT
Start: 2017-02-04 | End: 2017-02-10 | Stop reason: HOSPADM

## 2017-02-03 RX ORDER — LIDOCAINE HYDROCHLORIDE 20 MG/ML
1200 INJECTION, SOLUTION EPIDURAL; INFILTRATION; INTRACAUDAL; PERINEURAL ONCE
Status: DISCONTINUED | OUTPATIENT
Start: 2017-02-03 | End: 2017-02-03

## 2017-02-03 RX ORDER — SODIUM CHLORIDE 0.9 % (FLUSH) 0.9 %
5-10 SYRINGE (ML) INJECTION EVERY 8 HOURS
Status: DISCONTINUED | OUTPATIENT
Start: 2017-02-03 | End: 2017-02-10 | Stop reason: HOSPADM

## 2017-02-03 RX ADMIN — LIDOCAINE HYDROCHLORIDE 5 ML: 40 SOLUTION TOPICAL at 15:00

## 2017-02-03 RX ADMIN — FLUTICASONE PROPIONATE 2 SPRAY: 50 SPRAY, METERED NASAL at 23:57

## 2017-02-03 RX ADMIN — IPRATROPIUM BROMIDE AND ALBUTEROL SULFATE 3 ML: .5; 2.5 SOLUTION RESPIRATORY (INHALATION) at 01:10

## 2017-02-03 RX ADMIN — HEPARIN SODIUM 5000 UNITS: 5000 INJECTION, SOLUTION INTRAVENOUS; SUBCUTANEOUS at 18:31

## 2017-02-03 RX ADMIN — LIDOCAINE HYDROCHLORIDE: 20 JELLY TOPICAL at 15:36

## 2017-02-03 RX ADMIN — CARVEDILOL 25 MG: 12.5 TABLET, FILM COATED ORAL at 16:45

## 2017-02-03 RX ADMIN — BUMETANIDE 4 MG: 1 TABLET ORAL at 09:08

## 2017-02-03 RX ADMIN — LIDOCAINE HYDROCHLORIDE 5 ML: 20 SOLUTION ORAL; TOPICAL at 15:12

## 2017-02-03 RX ADMIN — Medication 10 ML: at 05:45

## 2017-02-03 RX ADMIN — IPRATROPIUM BROMIDE AND ALBUTEROL SULFATE 3 ML: .5; 2.5 SOLUTION RESPIRATORY (INHALATION) at 20:05

## 2017-02-03 RX ADMIN — HEPARIN SODIUM AND DEXTROSE 8.8 UNITS/KG/HR: 10000; 5 INJECTION INTRAVENOUS at 12:02

## 2017-02-03 RX ADMIN — INSULIN LISPRO 3 UNITS: 100 INJECTION, SOLUTION INTRAVENOUS; SUBCUTANEOUS at 16:30

## 2017-02-03 RX ADMIN — PANTOPRAZOLE SODIUM 40 MG: 40 TABLET, DELAYED RELEASE ORAL at 09:08

## 2017-02-03 RX ADMIN — IPRATROPIUM BROMIDE AND ALBUTEROL SULFATE 3 ML: .5; 2.5 SOLUTION RESPIRATORY (INHALATION) at 13:28

## 2017-02-03 RX ADMIN — Medication 10 ML: at 14:00

## 2017-02-03 RX ADMIN — ONDANSETRON 4 MG: 2 INJECTION INTRAMUSCULAR; INTRAVENOUS at 16:42

## 2017-02-03 RX ADMIN — SUCRALFATE 1 G: 1 TABLET ORAL at 22:39

## 2017-02-03 RX ADMIN — PROPOFOL 120 MCG/KG/MIN: 10 INJECTION, EMULSION INTRAVENOUS at 15:24

## 2017-02-03 RX ADMIN — Medication 10 ML: at 22:41

## 2017-02-03 RX ADMIN — MIDAZOLAM HYDROCHLORIDE 1 MG: 1 INJECTION, SOLUTION INTRAMUSCULAR; INTRAVENOUS at 15:24

## 2017-02-03 RX ADMIN — ATORVASTATIN CALCIUM 80 MG: 20 TABLET, FILM COATED ORAL at 18:19

## 2017-02-03 RX ADMIN — DEXTROSE MONOHYDRATE AND SODIUM CHLORIDE 50 ML/HR: 5; .9 INJECTION, SOLUTION INTRAVENOUS at 18:18

## 2017-02-03 RX ADMIN — PREDNISONE 40 MG: 20 TABLET ORAL at 09:09

## 2017-02-03 RX ADMIN — LIDOCAINE HYDROCHLORIDE 12 ML: 20 INJECTION, SOLUTION INFILTRATION; PERINEURAL at 15:36

## 2017-02-03 RX ADMIN — METHYLPREDNISOLONE SODIUM SUCCINATE 40 MG: 40 INJECTION, POWDER, FOR SOLUTION INTRAMUSCULAR; INTRAVENOUS at 16:41

## 2017-02-03 RX ADMIN — INSULIN LISPRO 3 UNITS: 100 INJECTION, SOLUTION INTRAVENOUS; SUBCUTANEOUS at 08:21

## 2017-02-03 RX ADMIN — CLOPIDOGREL 75 MG: 75 TABLET, FILM COATED ORAL at 09:08

## 2017-02-03 RX ADMIN — MAGNESIUM SULFATE HEPTAHYDRATE 1 G: 1 INJECTION, SOLUTION INTRAVENOUS at 16:41

## 2017-02-03 RX ADMIN — SODIUM CHLORIDE: 9 INJECTION, SOLUTION INTRAVENOUS at 15:15

## 2017-02-03 RX ADMIN — PROPOFOL 50 MG: 10 INJECTION, EMULSION INTRAVENOUS at 15:24

## 2017-02-03 RX ADMIN — CARVEDILOL 25 MG: 12.5 TABLET, FILM COATED ORAL at 09:08

## 2017-02-03 RX ADMIN — Medication 10 ML: at 14:51

## 2017-02-03 RX ADMIN — WARFARIN SODIUM 5 MG: 5 TABLET ORAL at 18:19

## 2017-02-03 RX ADMIN — SUCRALFATE 1 G: 1 TABLET ORAL at 12:55

## 2017-02-03 RX ADMIN — TEMAZEPAM 30 MG: 15 CAPSULE ORAL at 01:23

## 2017-02-03 RX ADMIN — HEPARIN SODIUM 5000 UNITS: 5000 INJECTION, SOLUTION INTRAVENOUS; SUBCUTANEOUS at 12:55

## 2017-02-03 RX ADMIN — IPRATROPIUM BROMIDE AND ALBUTEROL SULFATE 3 ML: .5; 2.5 SOLUTION RESPIRATORY (INHALATION) at 07:39

## 2017-02-03 RX ADMIN — MAGNESIUM SULFATE HEPTAHYDRATE 2 G: 40 INJECTION, SOLUTION INTRAVENOUS at 20:59

## 2017-02-03 RX ADMIN — INSULIN LISPRO 8 UNITS: 100 INJECTION, SOLUTION INTRAVENOUS; SUBCUTANEOUS at 22:41

## 2017-02-03 RX ADMIN — AMLODIPINE BESYLATE 10 MG: 5 TABLET ORAL at 09:09

## 2017-02-03 RX ADMIN — LOSARTAN POTASSIUM 25 MG: 25 TABLET, FILM COATED ORAL at 09:08

## 2017-02-03 RX ADMIN — MIDAZOLAM HYDROCHLORIDE 1 MG: 1 INJECTION, SOLUTION INTRAMUSCULAR; INTRAVENOUS at 15:22

## 2017-02-03 RX ADMIN — ISOSORBIDE MONONITRATE 120 MG: 30 TABLET ORAL at 09:08

## 2017-02-03 RX ADMIN — METHYLPREDNISOLONE SODIUM SUCCINATE 40 MG: 40 INJECTION, POWDER, FOR SOLUTION INTRAMUSCULAR; INTRAVENOUS at 22:39

## 2017-02-03 RX ADMIN — HYDROCODONE BITARTRATE AND ACETAMINOPHEN 2 TABLET: 5; 325 TABLET ORAL at 03:27

## 2017-02-03 RX ADMIN — HEPARIN SODIUM AND DEXTROSE 8.8 UNITS/KG/HR: 10000; 5 INJECTION INTRAVENOUS at 16:42

## 2017-02-03 RX ADMIN — POTASSIUM CHLORIDE 40 MEQ: 1.5 POWDER, FOR SOLUTION ORAL at 09:09

## 2017-02-03 RX ADMIN — ONDANSETRON 4 MG: 2 INJECTION INTRAMUSCULAR; INTRAVENOUS at 12:55

## 2017-02-03 RX ADMIN — ONDANSETRON 4 MG: 2 INJECTION INTRAMUSCULAR; INTRAVENOUS at 05:44

## 2017-02-03 NOTE — PERIOP NOTES
80 mL of 0.9%NS used for RUL BAL by Dr Marcio Fairbanks; 50 mL of fluid retrieved (30 mL including viral to microbiology and 20 mL to cytology per Dr Marcio Fairbanks).

## 2017-02-03 NOTE — PROGRESS NOTES
CV addendum:  Large pericardial effusion reported on CT -- these often are smaller than reported on CT -- echo pending.

## 2017-02-03 NOTE — PROGRESS NOTES
Pt has Norco 5/325mg 1 tab q 4 hrs as needed for pain. Pt watching tv program while playing on her ipod with KOALA.CH. stating her pain medications is not helping/working. Notified on call hospitalist  with new orders to give 2 tablet q 4hrs. 2238 Sleep medications offered 2x's but pt stated she needs only her pain medications    2215 Left ac heparin drip pump keep beeping pt need other site for drip. Called ICU to come to start iv access but staff are busy at this moment. Informed nursing supervisor and she came to try once but pt is very difficult sticks. ED tech came to start iv access but pt refusing for insertion. ED tech able to collect blood for PTT. Cont.to monitor. 0115 Lab called for PTT of 128.6 seconds. Heparin drip protocol followed and cont. to monitor. 0514 PTT 50.9 per protocol decrease rate by 3 units/kg/hr. old rate 9.8 units/kg/hr now @ 6.8unit/kg/hr. Next PTT @ 1115 today.

## 2017-02-03 NOTE — PERIOP NOTES
TRANSFER - OUT REPORT:    Verbal report given to Connie EVANS (name) on [address]  being transferred to [address] [place] [address] (unit) for routine progression of care       Report consisted of patients Situation, Background, Assessment and   Recommendations(SBAR). Information from the following report(s) SBAR, Procedure Summary, MAR and Recent Results was reviewed with the receiving nurse. Lines:   Peripheral IV 02/02/17 Left Antecubital (Active)   Site Assessment Clean, dry, & intact 2/3/2017  2:38 PM   Phlebitis Assessment 0 2/3/2017  2:38 PM   Infiltration Assessment 0 2/3/2017  2:38 PM   Dressing Status Clean, dry, & intact 2/3/2017  2:38 PM   Dressing Type Transparent;Tape 2/3/2017  2:38 PM   Hub Color/Line Status Pink;Patent; Infusing 2/3/2017  2:38 PM   Action Taken Open ports on tubing capped 2/3/2017  2:38 PM   Alcohol Cap Used Yes 2/3/2017  2:38 PM      Patient may drink/eat at [phone]. Opportunity for questions and clarification was provided. Patient transported with:   O2 @ 4 liters    Patients chart   IV pump infusing heparin drip; Ana Gilliam RN notified heparin bag almost empty and we were unable to obtain from [facility].

## 2017-02-03 NOTE — ANESTHESIA POSTPROCEDURE EVALUATION
Post-Anesthesia Evaluation and Assessment    Patient: Lee Carter MRN: [id number]  SSN: [id number]    YOB: 1955  Age: 63 y.o. Sex: female       Cardiovascular Function/Vital Signs  Visit Vitals    /66    Pulse 79    Temp 36.4 °C (97.6 °F)    Resp 20    Ht 5' 10\" (1.778 m)    Wt 152 kg (335 lb 1.6 oz)    SpO2 98%    Breastfeeding No    BMI 48.08 kg/m2       Patient is status post MAC anesthesia for Procedure(s):  BRONCHOSCOPY  BRONCHIAL ALVEOLAR LAVAGE. Nausea/Vomiting: None    Postoperative hydration reviewed and adequate. Pain:  Pain Scale 1: Numeric (0 - 10) (02/03/17 1602)  Pain Intensity 1: 0 (02/03/17 1602)   Managed    Neurological Status:   Neuro  Neurologic State: Alert (02/03/17 1000)  Orientation Level: Oriented X4 (02/03/17 1000)  Cognition: Appropriate decision making (02/03/17 1000)   At baseline    Mental Status and Level of Consciousness: Arousable    Pulmonary Status:   O2 Device: Nasal cannula (02/03/17 1602)   Adequate oxygenation and airway patent    Complications related to anesthesia: None    Post-anesthesia assessment completed.  No concerns    Signed By: Juliann Guo MD     February 3, 2017

## 2017-02-03 NOTE — CONSULTS
Jaymie Reno MD   [facility] [address]    Office [phone]  Mobile [phone]   Cardiology Consultation:    Marlo Cross is a 63 y.o. female admitted on 2/2/2017  2:36 PM for DVT (deep venous thrombosis) (HCC);airway survey. I am asked by the hospitalist to evaluate for SOB, edema. IMPRESSION:   1: Progressive SOB, LARA:      Likely from progressive ILD, possibly with superimposed infection and/or Pulm Embolic disease      Doubt any of current problems are from CHF  2: HBP, Obesity, DM, RA, anemia  3: HCVD, CAD:      Post remote stent with favorable cor studies, see below. Possible diastolic CHF, not clear how clearly diagnosed given ILD, compensated at present. 4: Mild edema, possibly from venous insufficiency. Also DVT suggested by leg dopplers, age uncertain. 5: Chest tightness, nonstop for days or weaks, likely manifestation of respiratory distress, desaturation:      Doubt anginal symptom, since prolonged with negative troponins and EKG  6: ARF on CRF, ? overdiuresis? RECOMMENDATIONS / PLAN:   Reasonable to update echo, but unless major new WMA, would not pursue coronary studies, especially since CRF would make anything invasive high risk. She has slight edema but no obvious lung congestion, so it's possible she could manage with slight volume expansion, which might worsen edema but might help renal function. No clear need to change her regimen unless Renal sees a benefit. Will ask dr Jeramy Belcher to check in on Monday if still in hospital, please call partners over weekend prn. History of present illness:  Hx CAD, also diastolic CHF, with following most recent studies:  Cath 3/18/2014 - RA 9 PA 42/19/29, PCW 12, EDP 25, no LVG due to CKD, LM nl, LAD mild plaque, LCX patent stents OM1/OM2, RCA chronic proximal occlusion with L to R collaterals. ECHO 4/11/16: EF 60-65%. No WMA. LA mildly dilated.   Lexiscan Cardiolite 4/11/16: Normal. LVEF 55%. Compared to 3/29/11, no significant changes. Recently on chronic oxygen, worsening LARA with minimal activity, notes desat to low 80s with activity. Constant chest tightness, worse with activity. Admitted for worse SOB, possible infection or chronic/acute Pulm Embolic dis.   Past Medical History   Diagnosis Date     Sleep Apnea 2/16/2011    Angina, class III ([address] 8/9/2013    Aortic aneurysm ([address]     CAD (coronary Artery Disease) Native Artery 2/16/2011    CKD (chronic kidney disease) stage 3, GFR 30-59 ml/min 10/5/2012    Diabetic gastroparesis (HCC)     Diastolic heart failure ([address] 10/5/2012    Esophageal stricture 2012     dialted Dr. Nathaly Aquino G6PD deficiency ([address]      trait    GERD (gastroesophageal reflux disease)     Hypertensive Cardiovasc Dis Benign, No CHF 2/16/2011    ILD (interstitial lung disease) ([address]      open lung bx CJW 2010    OA (osteoarthritis)     Obesity 2/16/2011    Ovarian cancer ([address]      cervical and uterine    Rheumatoid arteritis ([address]     T2DM (type 2 diabetes mellitus) ([address] 8/9/2013    Tobacco use disorder 3/21/2012    Uterine cervix cancer ([address]     Vitamin D deficiency 8/9/2013      Past Surgical History   Procedure Laterality Date    Hx orthopaedic  11/12/12     right knee replacement    Hx hysterectomy      Hx cholecystectomy      Hx appendectomy      Hx hernia repair      Hx carpal tunnel release       bilateral    Hx tonsil and adenoidectomy      Pr cardiac surg procedure unlist       stents    Upper gi endoscopy,demetrius w guide  6/24/2016          Colonoscopy N/A 6/24/2016     COLONOSCOPY performed by Alma Rosa Thompson MD at [facility] ENDOSCOPY     Family History   Problem Relation Age of Onset    Heart Disease Mother     Heart Disease Brother       Social History   Substance Use Topics    Smoking status: Former Smoker     Packs/day: 0.50     Years: 41.00     Types: Cigarettes     Quit date: 11/9/2014    Smokeless tobacco: Never Used    Alcohol use No       Primary care physician:  None     Medications before admission:  Prescriptions Prior to Admission   Medication Sig    albuterol-ipratropium (DUONEB) 2.5 mg-0.5 mg/3 ml nebu 3 mL by Nebulization route every four (4) hours.  sucralfate (CARAFATE) 1 gram tablet Take 1 g by mouth Before breakfast, lunch, dinner and at bedtime.  fluticasone (FLONASE) 50 mcg/actuation nasal spray 2 Sprays by Both Nostrils route nightly.  mupirocin (BACTROBAN) 2 % ointment Apply  to affected area two (2) times daily as needed (lesions on feet when needed). Ointment external two times daily to lesions on feet until healed - now using as needed    temazepam (RESTORIL) 30 mg capsule Take 30 mg by mouth nightly as needed for Sleep.  predniSONE (DELTASONE) 10 mg tablet Take  by mouth. First dose 2/2/17 Prednisone 40mg x 3 days then 30mg x 3 days then 20mg x 3 days then 10mg x 3 days    amoxicillin-clavulanate (AUGMENTIN) 875-125 mg per tablet Take 1 Tab by mouth every twelve (12) hours. X 10 days started 2/1/17    albuterol (PROVENTIL HFA, VENTOLIN HFA, PROAIR HFA) 90 mcg/actuation inhaler Take 2 Puffs by inhalation every four (4) hours as needed for Wheezing.  calcitRIOL (ROCALTROL) 0.25 mcg capsule Take 0.25 mcg by mouth two (2) days a week. Patient takes on Monday and Thursday    benzonatate (TESSALON PERLES) 100 mg capsule Take 100 mg by mouth three (3) times daily as needed for Cough.  guaiFENesin-codeine (CHERATUSSIN AC) 100-10 mg/5 mL solution Take 5 mL by mouth three (3) times daily as needed for Cough.  isosorbide mononitrate ER (IMDUR) 120 mg CR tablet TAKE 1 TABLET BY MOUTH EVERY DAY    bumetanide (BUMEX) 2 mg tablet Take two tablets twice a day.     carvedilol (COREG) 25 mg tablet TAKE 1 TABLET BY MOUTH TWICE A DAY    losartan (COZAAR) 25 mg tablet TAKE 1 TABLET BY MOUTH ONCE A DAY    clopidogrel (PLAVIX) 75 mg tablet TAKE 1 TABLET BY MOUTH EVERY DAY    pantoprazole (PROTONIX) 40 mg tablet Take 1 Tab by mouth daily. Indications: GASTROESOPHAGEAL REFLUX    nitroglycerin (NITROSTAT) 0.3 mg SL tablet 1 Tab by SubLINGual route every five (5) minutes as needed for Chest Pain.  INSULIN LISPRO (HUMALOG SC) by SubCUTAneous route Before breakfast, lunch, and dinner. Sliding scale.  oxyCODONE-acetaminophen (PERCOCET 7.5) 7.5-325 mg per tablet Take 1 Tab by mouth every four (4) hours as needed.  amLODIPine (NORVASC) 10 mg tablet Take 10 mg by mouth daily.  ergocalciferol (VITAMIN D2) 50,000 unit capsule Take 50,000 Units by mouth every Tuesday and Thursday.  aspirin 81 mg tablet Take 81 mg by mouth daily.  atorvastatin (LIPITOR) 80 mg tablet Take 80 mg by mouth every evening. Review of systems:  Ambulates with walker. All other systems reviewed and are negative except as above. Physical Exam:  Visit Vitals    /56 (BP 1 Location: Right arm, BP Patient Position: At rest)    Pulse 97    Temp 98.1 °F (36.7 °C)    Resp 18    Ht 5' 10\" (1.778 m)    Wt 335 lb 1.6 oz (152 kg)    SpO2 99%    Breastfeeding No    BMI 48.08 kg/m2        Appearance: morbid obesity    Cardiovascular: leg pulses not palpable, normal heart sounds without murmurs    Lungs: fine rales diffusely, right more than left    Extremities: mild pitting       Laboratory and Imaging have been personally reviewed and are notable for:    EKG: normal    X Ray: CXR and CT noted.  No obvious CHF    Labs:    Recent Labs      02/03/17   0330   NA  140   K  3.2*   CL  104   BUN  60*   CREA  3.31*   GLU  187*   CA  8.0*     Recent Labs      02/03/17   0330   WBC  12.3*   HGB  7.9*   HCT  24.2*   PLT  314

## 2017-02-03 NOTE — ANESTHESIA PREPROCEDURE EVALUATION
Anesthetic History               Review of Systems / Medical History  Patient summary reviewed    Pulmonary        Sleep apnea: CPAP        Comments: Hx of smoking  Interstitial Lung Disease  Worsening LARA, SOB last few months   Neuro/Psych              Cardiovascular    Hypertension          Past MI, CAD and cardiac stents    Exercise tolerance: <4 METS  Comments: Cardiac workup 5/2016 - per patient \"everything is ok\".   EF (4/2016) 60% - 65%   GI/Hepatic/Renal     GERD    Renal disease: CRI      Comments: Stage 3 CKD Endo/Other    Diabetes: type 2, using insulin    Morbid obesity and arthritis (rheumatoid)     Other Findings              Physical Exam    Airway  Mallampati: III  TM Distance: 4 - 6 cm  Neck ROM: normal range of motion   Mouth opening: Normal     Cardiovascular    Rhythm: regular  Rate: normal         Dental    Dentition: Edentulous     Pulmonary  Breath sounds clear to auscultation               Abdominal  GI exam deferred       Other Findings            Anesthetic Plan    ASA: 4  Anesthesia type: MAC          Induction: Intravenous  Anesthetic plan and risks discussed with: Patient

## 2017-02-03 NOTE — DIABETES MGMT
DTC Consult Note     POC Glucose last 24hrs:     Lab Results   Component Value Date/Time     (H) 02/03/2017 03:30 AM    GLU 84 02/02/2017 03:20 PM    GLUCPOC 165 (H) 02/03/2017 07:25 AM    GLUCPOC 235 (H) 02/02/2017 09:20 PM        Recommendations/ Comments: Attempted to see Mrs. Alvarez Never. She was about to be seen by physical therapy. Will follow-up later today to complete consult. [facility]  [facility]    Assessment of home management  []    Meal planning  []    Meter / Monitoring  []    New Diagnosis  []    Insulin education  []    Insulin pump notification  [x]    Medication recommendations  [x]    Hospital glucose management  []    Julia Lobo  []    Outpatient Education - Information forwarded to [facility] who will follow-up with pt 1 week after discharge     Hospital (inpatient) medications:  1. Correction Scale: Lispro (Humalog) Insulin Resistant Sensitivity scale to cover for glucose > 139 mg/dL before meals and for glucose >199 at bedtime    -pt is additionally receiving po prednisone     Assessment / Plan:     Chart reviewed and initial evaluation complete on [address] [place] [address] . [address] [place] [address] is a 64 yo AA female with a past medical history significant for  DM type 2, per  [facility] dated 2/2/2017. Per past medical records home medications are  1. Humalog - sliding scale - breakfast, lunch and dinner              A1C:   Lab Results   Component Value Date/Time    Hemoglobin A1c 6.6 02/03/2017 03:30 AM    Hemoglobin A1c 5.2 11/09/2014 04:00 AM       Thank you.     Hilda Cruz, Certified Diabetes Educator    [phone]

## 2017-02-03 NOTE — PROGRESS NOTES
TRANSFER - IN REPORT:    Verbal report received from CRISTINA Soriano(name) on David Rabago  being received from ED(unit) for routine progression of care      Report consisted of patients Situation, Background, Assessment and   Recommendations(SBAR). Information from the following report(s) SBAR, Kardex and ED Summary was reviewed with the receiving nurse. Opportunity for questions and clarification was provided. Assessment completed upon patients arrival to unit and care assumed.    Primary Nurse Austin Grey and Jayme Hernandez RN performed a dual skin assessment on this patient No impairment noted  Randy score is 23

## 2017-02-03 NOTE — INTERDISCIPLINARY ROUNDS
Interdisciplinary team rounds were held 1/30/2017 with the following team members:Care Management, Unit Nurse Manager, Nursing, Nutrition, Pharmacy, Physical Therapy, Physician, Clinical Coordinator. Plan of care discussed. See clinical pathway and/or care plan for interventions and desired outcomes.     Anticipated discharge: pending

## 2017-02-03 NOTE — PROGRESS NOTES
Problem: Mobility Impaired (Adult and Pediatric)  Goal: *Acute Goals and Plan of Care (Insert Text)  Physical Therapy Goals  Initiated 2/3/2017  1. Patient will move from supine to sit and sit to supine in bed with independence within 7 day(s). 2. Patient will transfer from bed to chair and chair to bed with modified independence using the least restrictive device within 7 day(s). 3. Patient will perform sit to stand with independence within 7 day(s). 4. Patient will ambulate with independence for 75 feet with the least restrictive device within 7 day(s). 5. Patient will ascend/descend 2 stairs with 0 handrail(s) with independence within 7 day(s). PHYSICAL THERAPY EVALUATION  Patient: Leonardo Murray (89 y.o. female)  Date: 2/3/2017  Primary Diagnosis: DVT (deep venous thrombosis) (Formerly [facility])        Precautions: Fall and DVT         ASSESSMENT :  Based on the objective data described below, the patient presents with SOB present now for > 1 month and easily exacerbated with activity. She reports a limited room to room existence on 2 L continuous O2 over the last month. She was diagnosed with a RLE DVT with heparin therapy completed yesterday. She was cleared by nsg prior to tx and reports walking herself to the bathroom and back. She continues to undergo medical work up to identify the cause of increased work of breathing. She may benefit from a rehab setting to build her cardio pulm endurance once medically stable. Patient will benefit from skilled intervention to address the above impairments.   Patients rehabilitation potential is considered to be Fair  Factors which may influence rehabilitation potential include:   [ ]         None noted  [ ]         Mental ability/status  [X]         Medical condition  [ ]         Home/family situation and support systems  [ ]         Safety awareness  [ ]         Pain tolerance/management  [ ]         Other:        PLAN :  Recommendations and Planned Interventions:  [X]           Bed Mobility Training             [ ]    Neuromuscular Re-Education  [X]           Transfer Training                   [ ]    Orthotic/Prosthetic Training  [X]           Gait Training                         [ ]    Modalities  [X]           Therapeutic Exercises           [ ]    Edema Management/Control  [X]           Therapeutic Activities            [ ]    Patient and Family Training/Education  [ ]           Other (comment):     Frequency/Duration: Patient will be followed by physical therapy  4 times a week to address goals. Discharge Recommendations: To Be Determined  Further Equipment Recommendations for Discharge: to be determined          SUBJECTIVE:   Patient stated I don't know how much I can do. I don't feel up to walking today.       OBJECTIVE DATA SUMMARY:   HISTORY:    Past Medical History   Diagnosis Date     Sleep Apnea 2/16/2011    Angina, class III ([address] 8/9/2013    Aortic aneurysm ([address]      CAD (coronary Artery Disease) Native Artery 2/16/2011    CKD (chronic kidney disease) stage 3, GFR 30-59 ml/min 10/5/2012    Diabetic gastroparesis (HCC)      Diastolic heart failure ([address] 10/5/2012    Esophageal stricture 2012       dialted Dr. Jessie Marrufo G6PD deficiency ([address]         trait    GERD (gastroesophageal reflux disease)      Hypertensive Cardiovasc Dis Benign, No CHF 2/16/2011    ILD (interstitial lung disease) ([address]         open lung bx CJW 2010    OA (osteoarthritis)      Obesity 2/16/2011    Ovarian cancer ([address]         cervical and uterine    Rheumatoid arteritis ([address]      T2DM (type 2 diabetes mellitus) ([address] 8/9/2013    Tobacco use disorder 3/21/2012    Uterine cervix cancer ([address]      Vitamin D deficiency 8/9/2013     Past Surgical History   Procedure Laterality Date    Hx orthopaedic   11/12/12       right knee replacement    Hx hysterectomy        Hx cholecystectomy        Hx appendectomy        Hx hernia repair        Hx carpal tunnel release           bilateral    Hx tonsil and adenoidectomy        Pr cardiac surg procedure unlist           stents    Upper gi endoscopy,demetrius costello guide   6/24/2016            Colonoscopy N/A 6/24/2016       COLONOSCOPY performed by Venkata Bellamy MD at [facility] [address]     Prior Level of Function/Home Situation: increased independence > 1 month ago, room to room existence on 2L O2 recently  Personal factors and/or comorbidities impacting plan of care:      Home Situation  Home Environment: Private residence  # Steps to Enter: 2  Rails to Enter: No  One/Two Story Residence: One story  Living Alone: No  Support Systems: Family member(s)  Patient Expects to be Discharged to[de-identified] Private residence  Current DME Used/Available at Home: CPAP, Cane, straight, Raised toilet seat, Shower chair, Walker, rolling     EXAMINATION/PRESENTATION/DECISION MAKING:   Critical Behavior:  Neurologic State: Alert  Orientation Level: Oriented X4        Skin:  Intact as observed  Strength:    Strength: Generally decreased, functional                    Tone & Sensation:                                  Range Of Motion:  AROM: Generally decreased, functional                       Coordination:        Functional Mobility:  Bed Mobility:     Supine to Sit: Contact guard assistance  Sit to Supine: Contact guard assistance     Transfers:  Sit to Stand: Contact guard assistance  Stand to Sit: Contact guard assistance                       Balance:   Sitting: Intact  Standing: Impaired  Standing - Static: Fair  Standing - Dynamic : Fair  Ambulation/Gait Training:  Distance (ft): 20 Feet (ft)     Ambulation - Level of Assistance: Contact guard assistance     Gait Description (WDL): Exceptions to WDL  Gait Abnormalities: Trunk sway increased;Decreased step clearance        Base of Support: Widened     Speed/Nichole: Slow;Shuffled           Functional Measure:  Tinetti test:      Sitting Balance: 1  Arises: 1  Attempts to Rise: 2  Immediate Standing Balance: 2  Standing Balance: 1  Nudged: 2  Eyes Closed: 1  Turn 360 Degrees - Continuous/Discontinuous: 1  Turn 360 Degrees - Steady/Unsteady: 1  Sitting Down: 1  Balance Score: 13  Indication of Gait: 1  R Step Length/Height: 1  L Step Length/Height: 1  R Foot Clearance: 1  L Foot Clearance: 1  Step Symmetry: 1  Step Continuity: 1  Path: 1  Trunk: 1  Walking Time: 0  Gait Score: 9  Total Score: 22         Tinetti Test and G-code impairment scale:  Percentage of Impairment CH     0%    CI     1-19% CJ     20-39% CK     40-59% CL     60-79% CM     80-99% CN      100%   Tinetti  Score 0-28 28 23-27 17-22 12-16 6-11 1-5 0          Tinetti Tool Score Risk of Falls  <19 = High Fall Risk  19-24 = Moderate Fall Risk  25-28 = Low Fall Risk  Tinetti ME. Performance-Oriented Assessment of Mobility Problems in Elderly Patients. [facility]; [id number]. (Scoring Description: PT Bulletin Feb. 10, 1993)     Older adults: Becka Lennon et al, 2009; n = 1000 [facility] elderly evaluated with ABC, SAMARIA, ADL, and IADL)  · Mean SAMARIA score for males aged 69-68 years = 26.21(3.40)  · Mean SAMARIA score for females age 69-68 years = 25.16(4.30)  · Mean SAMARIA score for males over 80 years = 23.29(6.02)  · Mean SAMARIA score for females over 80 years = 17.20(8.32)         G codes: In compliance with CMSs Claims Based Outcome Reporting, the following G-code set was chosen for this patient based on their primary functional limitation being treated: The outcome measure chosen to determine the severity of the functional limitation was the Tinetti with a score of 22/28 which was correlated with the impairment scale.       · Mobility - Walking and Moving Around:               - CURRENT STATUS:    CJ - 20%-39% impaired, limited or restricted               - GOAL STATUS:           CI - 1%-19% impaired, limited or restricted               - D/C STATUS:                       ---------------To be determined---------------      Physical Therapy Evaluation Charge Determination   History Examination Presentation Decision-Making   HIGH Complexity :3+ comorbidities / personal factors will impact the outcome/ POC  MEDIUM Complexity : 3 Standardized tests and measures addressing body structure, function, activity limitation and / or participation in recreation  MEDIUM Complexity : Evolving with changing characteristics  MEDIUM Complexity : FOTO score of 26-74      Based on the above components, the patient evaluation is determined to be of the following complexity level: MEDIUM     Pain:  Pain Scale 1: Visual  Pain Intensity 1: 0  Pain Location 1: Chest;Leg  Pain Orientation 1: Anterior;Mid;Right  Pain Description 1: Aching;Constant  Pain Intervention(s) 1: Medication (see MAR)  Activity Tolerance:   SpO2 >96% and HR 65-70 BPM with activity on 3L O2  After treatment:   [X]         Patient left in no apparent distress sitting up in chair  [ ]         Patient left in no apparent distress in bed  [X]         Call bell left within reach  [X]         Nursing notified  [ ]         Caregiver present  [ ]         Bed alarm activated      COMMUNICATION/EDUCATION:   The patients plan of care was discussed with: Registered Nurse.  [X]         Fall prevention education was provided and the patient/caregiver indicated understanding. [X]         Patient/family have participated as able in goal setting and plan of care. [X]         Patient/family agree to work toward stated goals and plan of care. [ ]         Patient understands intent and goals of therapy, but is neutral about his/her participation. [ ]         Patient is unable to participate in goal setting and plan of care.      Thank you for this referral.  Addy Fonseca, PT, DPT   Time Calculation: 25 mins

## 2017-02-03 NOTE — CONSULTS
Name: Arias Luis: [address]   : 1955 Admit Date: 2017   Phone: [phone]  Room: [facility]   PCP: None  MRN: [id number]   Date: 2/3/2017  Code: Full Code        HPI:    Chart and notes reviewed. Data reviewed. I review the patient's current medications in the medical record at each encounter. I have evaluated and examined the patient. 9:20 AM       History was obtained from patient and chart. I was asked by Jacobo Benitez DO to see Jeffrey Chris in consultation for a chief complaint of ILD, DVT, and worsening SOB. Mrs. Jan Clark is a pleasant 63year old female well known to our group with history of chronic respiratory failure. Smoking related ILD (via open lung biopsy in ), JASEN, diastolic heart failure, pulm HTN, and CKD who presented to the [address] [place] [address] ED under the advice from the pulmonary NP due to LE DVT and abnormal chest CT. Patient was seen outpatient by Luisito Madison NP from our group two days ago with persistent shortness of breath, wheezing, and nonproductive dry cough which started several weeks ago. Patient also reported recent sinus infection (no fevers, +chills baseline, generalized weakness). She also reports chronic chest pain worsening over the last year, described as a \"heavy chest\" worsened with coughing and deep breathing. She was initially treated with Bactrim 2 weeks ago for a sinus infection. States she was also prescribed clarithromycin at some point. She presented to [address] [place] [address] ED on  and was treated for pneumonia with 5-day steroid burst and 7-day course of Doxycycline. She contacted her cardiology office regarding her persistent SOB and CP and was referred to [facility].  She was evaluated on  and CXR at that time reportedly with unchanged bilateral opacities. A new abx was prescribed, but she did not start it due to interaction with Lipitor, so has continued on Doxycycline.   She has been taking albuterol nebs 4 times a day as well as cough syrup. She has continued on her baseline 2L continuous O2. She reports nightly compliance with CPAP. Reports past evaluation by her cardiologist, Dr. Tyler Bateman, and states she has had a negative cardiac workup in the last 6 months for her chronic chest pain. She is s/p stent placement in the past.  EGD was performed for suspected esophageal spasms, which was unremarkable and it was recommended she continue on Protonix. She was started on 10 day course of Augmentin on 2/1 by outpatient pulmonary as well as prolonged prednisone taper. Bronchoscopy was recommended if her symptoms persisted. Chest CT and LE dopplers were ordered which revealed RLE DVT. This morning, Mrs. Diego Piper feels mostly unchanged. Reports continued LARA and fatigue. Reports continued CP and heavy sensation her her chest.  Has dry cough. Denies fever or chills. Reports chronic epigastric CP. Denies other abd pain. Chest CT is personally visualized. There are new increased parenchymal opacities identified most pronounced anteriorly in the right upper lobe but also in the left upper lobe and to alesser extent superior segment of the left lower lobe, which could be due to alveolitis vs  mild acute infection vs. progression of the previous disease. There is bibasilar honeycombing R>L which is unchanged as is bibasilar bronchiectasis. There is elarged main pulmonary segment suggest pulmonary artery hypertension and increase in size of pericardial effusion.     WBC 12.3  Hgb 7.9  K 3.2  Mag 1.2  Trop < 0.04    4.2016 ECHO with EF 60%      Past Medical History   Diagnosis Date     Sleep Apnea 2/16/2011    Angina, class III ([address] 8/9/2013    Aortic aneurysm (HCC)     CAD (coronary Artery Disease) Native Artery 2/16/2011    CKD (chronic kidney disease) stage 3, GFR 30-59 ml/min 10/5/2012    Diabetic gastroparesis (HCC)     Diastolic heart failure ([address] 10/5/2012    Esophageal stricture 2012     dialted Dr. Walters Scales G6PD deficiency [facility])      trait    GERD (gastroesophageal reflux disease)     Hypertensive Cardiovasc Dis Benign, No CHF 2/16/2011    ILD (interstitial lung disease) ([facility]      open lung bx CJW 2010    OA (osteoarthritis)     Obesity 2/16/2011    Ovarian cancer ([facility]      cervical and uterine    Rheumatoid arteritis ([facility]     T2DM (type 2 diabetes mellitus) ([facility] 8/9/2013    Tobacco use disorder 3/21/2012    Uterine cervix cancer ([facility]     Vitamin D deficiency 8/9/2013       Past Surgical History   Procedure Laterality Date    Hx orthopaedic  11/12/12     right knee replacement    Hx hysterectomy      Hx cholecystectomy      Hx appendectomy      Hx hernia repair      Hx carpal tunnel release       bilateral    Hx tonsil and adenoidectomy      Pr cardiac surg procedure unlist       stents    Upper gi endoscopy,dilatn w guide  6/24/2016          Colonoscopy N/A 6/24/2016     COLONOSCOPY performed by Catrachita Martinez MD at [facility] ENDOSCOPY       Family History   Problem Relation Age of Onset    Heart Disease Mother     Heart Disease Brother        Social History   Substance Use Topics    Smoking status: Former Smoker     Packs/day: 0.50     Years: 41.00     Types: Cigarettes     Quit date: 11/9/2014    Smokeless tobacco: Never Used    Alcohol use No       Allergies   Allergen Reactions    Contrast Dye [Iodine] Anaphylaxis    Levaquin [Levofloxacin] Nausea and Vomiting    Morphine Hives and Itching       Current Facility-Administered Medications   Medication Dose Route Frequency    sodium chloride (NS) flush 5-10 mL  5-10 mL IntraVENous PRN    sodium chloride (NS) flush 5-10 mL  5-10 mL IntraVENous Q8H    sodium chloride (NS) flush 5-10 mL  5-10 mL IntraVENous PRN    sodium chloride (NS) flush 5-10 mL  5-10 mL IntraVENous Q8H    sodium chloride (NS) flush 5-10 mL  5-10 mL IntraVENous PRN    zolpidem (AMBIEN) tablet 5 mg  5 mg Oral QHS PRN    naloxone (NARCAN) injection 0.4 mg  0.4 mg IntraVENous PRN    ondansetron (ZOFRAN) injection 4 mg  4 mg IntraVENous Q4H PRN    glucose chewable tablet 16 g  4 Tab Oral PRN    dextrose (D50W) injection syrg 12.5-25 g  12.5-25 g IntraVENous PRN    glucagon (GLUCAGEN) injection 1 mg  1 mg IntraMUSCular PRN    insulin lispro (HUMALOG) injection   SubCUTAneous AC&HS    isosorbide mononitrate ER (IMDUR) tablet 120 mg  120 mg Oral DAILY    bumetanide (BUMEX) tablet 4 mg  4 mg Oral DAILY    carvedilol (COREG) tablet 25 mg  25 mg Oral BID WITH MEALS    losartan (COZAAR) tablet 25 mg  25 mg Oral DAILY    clopidogrel (PLAVIX) tablet 75 mg  75 mg Oral DAILY    pantoprazole (PROTONIX) tablet 40 mg  40 mg Oral DAILY    amLODIPine (NORVASC) tablet 10 mg  10 mg Oral DAILY    atorvastatin (LIPITOR) tablet 80 mg  80 mg Oral QPM    Warfarin pharmacy to dose   Other Rx Dosing/Monitoring    predniSONE (DELTASONE) tablet 40 mg  40 mg Oral DAILY WITH BREAKFAST    albuterol-ipratropium (DUO-NEB) 2.5 MG-0.5 MG/3 ML  3 mL Nebulization Q6H RT    benzonatate (TESSALON) capsule 100 mg  100 mg Oral TID PRN    fluticasone (FLONASE) 50 mcg/actuation nasal spray 2 Spray  2 Spray Both Nostrils QHS    temazepam (RESTORIL) capsule 30 mg  30 mg Oral QHS PRN    sucralfate (CARAFATE) tablet 1 g  1 g Oral AC&HS    heparin 25,000 units in D5W 250 ml infusion  9.8-36 Units/kg/hr IntraVENous TITRATE    influenza vaccine 2016-17 (36mos+)(PF) (FLUZONE/FLUARIX/FLULAVAL QUAD) injection 0.5 mL  0.5 mL IntraMUSCular PRIOR TO DISCHARGE    HYDROcodone-acetaminophen (NORCO) 5-325 mg per tablet 2 Tab  2 Tab Oral Q4H PRN         REVIEW OF SYSTEMS   Negative except as stated in the HPI.       Physical Exam:   Visit Vitals    /56 (BP 1 Location: Right arm, BP Patient Position: At rest)    Pulse 69    Temp 98.1 °F (36.7 °C)    Resp 18    Ht 5' 10\" (1.778 m)    Wt 152 kg (335 lb 1.6 oz)    SpO2 98%    Breastfeeding No    BMI 48.08 kg/m2 on 3L    General:  Alert, cooperative, no distress, appears stated age. Head:  Normocephalic, without obvious abnormality, atraumatic. Eyes:  Conjunctivae/corneas clear. Nose: Nares normal. Septum midline. Mucosa normal.    Throat: Lips, mucosa, and tongue normal. Class 4 airway. Neck: Thick, supple, symmetrical, trachea midline, no adenopathy. Lungs:   Mild coarse breath sounds with mild wheezing bilaterally - seems improved compared to exam at admission. Chest wall:  No tenderness or deformity. Heart:  Regular rate and rhythm, S1, S2 normal, no murmur, click, rub or gallop. Abdomen:   Obese, soft, non-tender. Bowel sounds normal. No masses,  No organomegaly. Extremities: Extremities normal, atraumatic, no cyanosis, + RLE edema. Pulses: 2+ and symmetric all extremities. Skin: Skin color, texture, turgor normal. No rashes or lesions   Lymph nodes: Cervical, supraclavicular nodes normal.   Neurologic: Grossly nonfocal       Lab Results   Component Value Date/Time    Sodium 140 02/03/2017 03:30 AM    Potassium 3.2 02/03/2017 03:30 AM    Chloride 104 02/03/2017 03:30 AM    CO2 24 02/03/2017 03:30 AM    BUN 60 02/03/2017 03:30 AM    Creatinine 3.31 02/03/2017 03:30 AM    Glucose 187 02/03/2017 03:30 AM    Calcium 8.0 02/03/2017 03:30 AM    Magnesium 1.2 02/02/2017 03:20 PM    Phosphorus 3.6 11/17/2014 04:30 AM    Lactic acid 1.2 02/02/2017 03:20 PM       Lab Results   Component Value Date/Time    WBC 12.3 02/03/2017 03:30 AM    HGB 7.9 02/03/2017 03:30 AM    PLATELET 679 87/56/0384 03:30 AM    MCV 90.3 02/03/2017 03:30 AM       Lab Results   Component Value Date/Time    INR 1.1 02/03/2017 03:30 AM    aPTT 50.9 02/03/2017 03:30 AM    AST (SGOT) 11 02/02/2017 03:20 PM    Alk.  phosphatase 79 02/02/2017 03:20 PM    Protein, total 6.9 02/02/2017 03:20 PM    Albumin 2.7 02/02/2017 03:20 PM    Globulin 4.2 02/02/2017 03:20 PM       Lab Results   Component Value Date/Time    Iron 29 11/09/2014 03:30 AM TIBC 245 11/09/2014 03:30 AM    Iron % saturation 12 11/09/2014 03:30 AM    Ferritin 255 11/09/2014 04:00 AM       No results found for: SR, CRP, GLENNY, ANAIGG, RA, RPR, RPRT, VDRLT, VDRLS, TSH, TSHEXT     No results found for: PH, PHI, PCO2, PCO2I, PO2, PO2I, HCO3, HCO3I, FIO2, FIO2I    Lab Results   Component Value Date/Time    CK 72 07/01/2015 07:42 PM    CK-MB Index CANNOT BE CALCULATED 07/01/2015 07:42 PM    Troponin-I, Qt. <0.04 02/03/2017 03:30 AM        Lab Results   Component Value Date/Time    Culture result: NO GROWTH AFTER 17 HOURS 02/02/2017 03:20 PM    Culture result: NO GROWTH 2 DAYS 11/11/2014 03:30 PM    Culture result: HEAVY 11/09/2014 07:10 AM    Culture result: NORMAL RESPIRATORY WAGNER 11/09/2014 07:10 AM    Culture result: MODERATE 11/09/2014 07:10 AM    Culture result: YEAST 11/09/2014 07:10 AM       No results found for: TOXA1, RPR, HBCM, HBSAG, HAAB, HCAB, HCAB1, HAAT, G6PD, CRYAC, HIVGT, HIVR, HIV1, HIV12, HIVPC, HIVRPI    Lab Results   Component Value Date/Time    CK 72 07/01/2015 07:42 PM    CK 42 11/09/2014 03:30 AM       Lab Results   Component Value Date/Time    Color YELLOW/STRAW 02/03/2017 05:36 AM    Appearance CLEAR 02/03/2017 05:36 AM    pH (UA) 5.0 02/03/2017 05:36 AM    Protein 100 02/03/2017 05:36 AM    Glucose NEGATIVE  02/03/2017 05:36 AM    Ketone NEGATIVE  02/03/2017 05:36 AM    Bilirubin NEGATIVE  02/03/2017 05:36 AM    Blood NEGATIVE  02/03/2017 05:36 AM    Urobilinogen 0.2 02/03/2017 05:36 AM    Nitrites NEGATIVE  02/03/2017 05:36 AM    Leukocyte Esterase NEGATIVE  02/03/2017 05:36 AM    WBC 0-4 02/03/2017 05:36 AM    RBC 0-5 02/03/2017 05:36 AM    Bacteria NEGATIVE  02/03/2017 05:36 AM       IMPRESSION  · Acute/chronic hypoxic respiratory failure: ? ILD exacerbation  · RLE DVT; may also have PE, but allergic to contrast and had CKD, so not able to check CTA. Agree that V/Q not likley to be helpful in light of ILD.   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 at [address] in 2010  · JASEN  · Chest pain  · Diastolic CHF  · Pulmonary HTN  · HTN  · Acute/chronic kidney disease stage 4  · History of tobacco use; quit 11/2014    PLAN  · Continue O2 and wean as able to keep sats > 90%  · Will plan for bronch/BAL this afternoon; remain NPO  · Continue scheduled Duonebs and switch prednisone to Solumedrol 40 mg q 8hr  · Agree with holding additional abx for now pending bronch findings  · Continue Tessalon and Flonase. · Diuresis per Renal; decreased loops and holding ARB   · Consult cardiology  · Heparin drip; will need to remain on anticoagulation for at least 3-6 months. · Replete K and Mag  · CPAP nightly  · GI prophylaxis: Protonix      Thank you for allowing us to participate in the care of this patient. We will be happy to follow along in her progress with you.     Lorene Samuel

## 2017-02-03 NOTE — PROGRESS NOTES
8:16 PM Bedside and Verbal shift change report given to Joe Alexander RN (oncoming nurse) by Shailesh Bajwa RN (offgoing nurse). Report included the following information SBAR and Kardex.

## 2017-02-03 NOTE — PROCEDURES
Procedure Note Dictated (#[id number])     Flexible Bronchoscopy Performed:  No EB lesions, masses, or active bleeding  Mild mucoid secretions noted bilaterally consistent with mild bilateral pneumonia  BAL performed in the anterior subsegment RUL and fluid sent to micro and for cytology  Pt tolerated the procedure well  No apparent complications noted

## 2017-02-03 NOTE — PROGRESS NOTES
Ezio Craig [facility] [address]  [address] [facility], [place], [address] [place] [address]  [phone]      Medical Progress Note      NAME: Jeffrey Chris   :  1955  MRM:  [id number]    Date/Time: 2/3/2017          Subjective:     Chief Complaint:  F/u resp failure    Chart/notes/labs/studies reviewed, patient examined at bedside. Feels a little better. SOB, coughing, nonproductive. No f/c          Objective:       Vitals:          Last 24hrs VS reviewed since prior progress note. Most recent are:    Visit Vitals    /71    Pulse 64    Temp 97.6 °F (36.4 °C)    Resp 18    Ht 5' 10\" (1.778 m)    Wt 152 kg (335 lb 1.6 oz)    SpO2 100%    Breastfeeding No    BMI 48.08 kg/m2     SpO2 Readings from Last 6 Encounters:   17 100%   17 98%   10/14/16 98%   16 94%   16 97%   16 100%    O2 Flow Rate (L/min): 3 l/min     Intake/Output Summary (Last 24 hours) at 17 1640  Last data filed at 17 1545   Gross per 24 hour   Intake              300 ml   Output                0 ml   Net              300 ml          Exam:     Physical Exam:    Gen:  Obese, pleasant, NAD  HEENT:  Sclerae nonicteric, hearing intact to voice, mucous membranes moist  Neck:  Supple, without masses. Resp:  No accessory muscle use but increased WOB, coarse, and mild wheezing, bilateral.   Card: RRR, without m/r/g. No LE edema. Abd:  +bowel sounds, soft, NTTP, nondistended. No HSM. Neuro: Face symmetric, tongue midline, speech fluent, follows commands appropriately  Psych:  Alert, oriented x 3.  Good insight     Medications Reviewed: (see below)    Lab Data Reviewed: (see below)    ______________________________________________________________________    Medications:     Current Facility-Administered Medications   Medication Dose Route Frequency    [START ON 2017] bumetanide (BUMEX) tablet 1 mg  1 mg Oral DAILY    methylPREDNISolone (PF) (SOLU-MEDROL) injection 40 mg  40 mg IntraVENous Q8H    oxyCODONE IR (ROXICODONE) tablet 10 mg  10 mg Oral Q3H PRN    magnesium sulfate 2 g/50 ml IVPB (premix or compounded)  2 g IntraVENous ONCE    magnesium sulfate 1 g/100 ml IVPB (premix or compounded)  1 g IntraVENous ONCE    [START ON 2/4/2017] polyethylene glycol (MIRALAX) packet 17 g  17 g Oral DAILY    [START ON 2/4/2017] senna-docusate (PERICOLACE) 8.6-50 mg per tablet 2 Tab  2 Tab Oral DAILY    dextrose 5% and 0.9% NaCl infusion  50 mL/hr IntraVENous CONTINUOUS    sodium chloride (NS) flush 5-10 mL  5-10 mL IntraVENous Q8H    sodium chloride (NS) flush 5-10 mL  5-10 mL IntraVENous PRN    warfarin (COUMADIN) tablet 5 mg  5 mg Oral ONCE    zolpidem (AMBIEN) tablet 5 mg  5 mg Oral QHS PRN    naloxone (NARCAN) injection 0.4 mg  0.4 mg IntraVENous PRN    ondansetron (ZOFRAN) injection 4 mg  4 mg IntraVENous Q4H PRN    glucose chewable tablet 16 g  4 Tab Oral PRN    dextrose (D50W) injection syrg 12.5-25 g  12.5-25 g IntraVENous PRN    glucagon (GLUCAGEN) injection 1 mg  1 mg IntraMUSCular PRN    insulin lispro (HUMALOG) injection   SubCUTAneous AC&HS    isosorbide mononitrate ER (IMDUR) tablet 120 mg  120 mg Oral DAILY    carvedilol (COREG) tablet 25 mg  25 mg Oral BID WITH MEALS    clopidogrel (PLAVIX) tablet 75 mg  75 mg Oral DAILY    pantoprazole (PROTONIX) tablet 40 mg  40 mg Oral DAILY    amLODIPine (NORVASC) tablet 10 mg  10 mg Oral DAILY    atorvastatin (LIPITOR) tablet 80 mg  80 mg Oral QPM    Warfarin pharmacy to dose   Other Rx Dosing/Monitoring    albuterol-ipratropium (DUO-NEB) 2.5 MG-0.5 MG/3 ML  3 mL Nebulization Q6H RT    benzonatate (TESSALON) capsule 100 mg  100 mg Oral TID PRN    fluticasone (FLONASE) 50 mcg/actuation nasal spray 2 Spray  2 Spray Both Nostrils QHS    temazepam (RESTORIL) capsule 30 mg  30 mg Oral QHS PRN    sucralfate (CARAFATE) tablet 1 g  1 g Oral AC&HS    heparin 25,000 units in D5W 250 ml infusion  9.8-36 Units/kg/hr IntraVENous TITRATE            Lab Review:     Recent Labs      02/03/17   0330  02/02/17   1520   WBC  12.3*  13.9*   HGB  7.9*  8.5*   HCT  24.2*  25.9*   PLT  314  346     Recent Labs      02/03/17   0330  02/02/17   1520   NA  140  145   K  3.2*  3.4*   CL  104  107   CO2  24  25   GLU  187*  84   BUN  60*  57*   CREA  3.31*  3.25*   CA  8.0*  8.0*   MG  1.2*  1.2*   ALB   --   2.7*   SGOT   --   11*   ALT   --   17   INR  1.1  1.1     No components found for: GLPOC  No results for input(s): PH, PCO2, PO2, HCO3, FIO2 in the last 72 hours. Recent Labs      02/03/17   0330  02/02/17   1520   INR  1.1  1.1     Lab Results   Component Value Date/Time    Specimen Description: BLOOD 10/14/2013 03:37 AM    Specimen Description: BLOOD 10/11/2013 12:03 AM    Specimen Description: NARES 07/12/2013 04:50 AM     Lab Results   Component Value Date/Time    Culture result: NO GROWTH AFTER 17 HOURS 02/02/2017 03:20 PM    Culture result: NO GROWTH 2 DAYS 11/11/2014 03:30 PM    Culture result: HEAVY 11/09/2014 07:10 AM    Culture result: NORMAL RESPIRATORY WAGNER 11/09/2014 07:10 AM    Culture result: MODERATE 11/09/2014 07:10 AM    Culture result: YEAST 11/09/2014 07:10 AM            Assessment / Plan:     62 yo AAF w/ hx of VATS biopsy proven smoking related ILD, CHF, CAD, CKD4 p/w dyspnea, found to have GOO on chest CT and distal LE DVT    Acute on chronic hypoxemic respiratory failure /  Interstitial lung disease: unclear precipitant. Underwent bronch on 2/3, found to have mild mucoid secretions noted bilaterally consistent with mild bilateral pneumonia  -- holding abx per pulm (has been on FQ, clarithromycin, augmentin, and doxycycline) over last few weeks  -- follow up on BAL results/micro/PNA workup  -- Continue steroids, now on IV  -- Scheduled duo-nebs     DVT (deep venous thrombosis): distal, hioweerin setting of worsening SOB, may also have PE. Allergic to contrast + severe CKD.  Has ILD so V/Q not likely to be helpful  -- cont heparin gtt bridge w/ warfarin. Follow INR     Chronic diastolic heart failure / Pulmonary HTN / CAD (coronary Artery Disease) Native Artery: in setting of worsening SOB   -- TTE ordered  -- Continue statin, bumex, coreg, imdur, ARB     HTN (hypertension):   -- cont Norvasc, Bumex, Coreg, ISMN     Morbid obesity:   -- Counseled on weight loss     CKD (chronic kidney disease) stage 4: nephrology following  -- Monitor BMP; holding ARB, decreasing diuretic  -- Renally dose meds     Anemia: likely ACD from CKD  -- follow Hg    DM (diabetes mellitus), type 2 with renal complications: worsened with steroids. Labile BG.  Controlled, A1c 6.6%  -- SSI       Total time spent with patient: 35 minutes                  Care Plan discussed with: Patient, Nursing Staff and >50% of time spent in counseling and coordination of care    Discussed:  Care Plan    Prophylaxis:  Coumadin / heparin    Disposition:  Home w/Family           ___________________________________________________    Attending Physician: Pérez Bazan MD

## 2017-02-03 NOTE — CONSULTS
[address] [facility] [place] Sands  YOB: 1955     Assessment & Plan:   1. ROSEANN on CKD 4  · Due to IVVF ( Poor PO, 2 Diuretics) and/or Bactrim  · DC ARB  · I have not ordered Urine Eos due to low PPV and  I have not ordered FeNa as she is on 2 diuretics  · Decrease Loops  · If CR < 3: resume Loops at home dose and ARB  · No HD needed, DTD eval  · Trace prt: due to DM,Obesity and smoking, No RBCs  2. CKD 4  · Cr 2.7-2/8 mg% at baseline, , Diabetic and Hypertensive Nephrosclerosis  · Hold ARB  3. DM  · Insulin  4. HTN  · BB,CCB,Thiazide,ARB,Loops  5. Resp Failure  · I think this is due to Exacerbation of ILD and ? PE  · I do not think this is volume  6. Obesity  · Wt loss will help  7. Smoker  · None now  8. Chronic Diastolic CHF  ·   · Decrease Loops            Subjective:   CHIEF COMPLAIN:arf  HPI: 63 yrs old AAF who sees DR. Christensen: RNA CKD clinic with baseline cr 2.7-2.8 mg: ( DM and HTN nephrosclerosis) is here with RT Post Tibial DVT: She is on Heparin. She has been given multiple ABX including Bactrim in recent past.  She now has worsening CR. She denies NSAIDs. She has poor PO intake and Nausea. Some constipation. She is zoraida long smoker. She is on Insulin for DM. She is on Loops,Thiazide,BB,CCB,ARB for BP. She is on Chronic inhalers. Review of Systems  A comprehensive review of systems was negative except for that written in the HPI.     Past Medical History   Diagnosis Date     Sleep Apnea 2/16/2011    Angina, class III ([address] 8/9/2013    Aortic aneurysm ([address]     CAD (coronary Artery Disease) Native Artery 2/16/2011    CKD (chronic kidney disease) stage 3, GFR 30-59 ml/min 10/5/2012    Diabetic gastroparesis (HCC)     Diastolic heart failure ([address] 10/5/2012    Esophageal stricture 2012     dialted Dr. Alicea Lot [id number] deficiency [facility])      trait    GERD (gastroesophageal reflux disease)     Hypertensive Cardiovasc Dis Benign, No CHF 2/16/2011    ILD (interstitial lung disease) ([facility]      open lung bx CJW 2010    OA (osteoarthritis)     Obesity 2/16/2011    Ovarian cancer ([facility]      cervical and uterine    Rheumatoid arteritis ([facility]     T2DM (type 2 diabetes mellitus) ([facility] 8/9/2013    Tobacco use disorder 3/21/2012    Uterine cervix cancer ([facility]     Vitamin D deficiency 8/9/2013      Past Surgical History   Procedure Laterality Date    Hx orthopaedic  11/12/12     right knee replacement    Hx hysterectomy      Hx cholecystectomy      Hx appendectomy      Hx hernia repair      Hx carpal tunnel release       bilateral    Hx tonsil and adenoidectomy      Pr cardiac surg procedure unlist       stents    Upper gi endoscopy,dilatn w guide  6/24/2016          Colonoscopy N/A 6/24/2016     COLONOSCOPY performed by Janette Ferro MD at [address] [place] [address] History     Social History    Marital status:      Spouse name: N/A    Number of children: N/A    Years of education: N/A     Occupational History    Not on file. Social History Main Topics    Smoking status: Former Smoker     Packs/day: 0.50     Years: 41.00     Types: Cigarettes     Quit date: 11/9/2014    Smokeless tobacco: Never Used    Alcohol use No    Drug use: No    Sexual activity: Not on file     Other Topics Concern    Not on file     Social History Narrative      Family History   Problem Relation Age of Onset    Heart Disease Mother     Heart Disease Brother       Prior to Admission medications    Medication Sig Start Date End Date Taking? Authorizing Provider   albuterol-ipratropium (DUONEB) 2.5 mg-0.5 mg/3 ml nebu 3 mL by Nebulization route every four (4) hours. Yes Historical Provider   sucralfate (CARAFATE) 1 gram tablet Take 1 g by mouth Before breakfast, lunch, dinner and at bedtime. Yes Historical Provider   fluticasone (FLONASE) 50 mcg/actuation nasal spray 2 Sprays by Both Nostrils route nightly.    Yes Historical Provider   mupirocin (BACTROBAN) 2 % ointment Apply  to affected area two (2) times daily as needed (lesions on feet when needed). Ointment external two times daily to lesions on feet until healed - now using as needed   Yes Historical Provider   temazepam (RESTORIL) 30 mg capsule Take 30 mg by mouth nightly as needed for Sleep. Yes Historical Provider   predniSONE (DELTASONE) 10 mg tablet Take  by mouth. First dose 2/2/17 Prednisone 40mg x 3 days then 30mg x 3 days then 20mg x 3 days then 10mg x 3 days 2/2/17 2/13/17 Yes Historical Provider   amoxicillin-clavulanate (AUGMENTIN) 875-125 mg per tablet Take 1 Tab by mouth every twelve (12) hours. X 10 days started 2/1/17 2/1/17 2/10/17 Yes Historical Provider   albuterol (PROVENTIL HFA, VENTOLIN HFA, PROAIR HFA) 90 mcg/actuation inhaler Take 2 Puffs by inhalation every four (4) hours as needed for Wheezing. Yes Historical Provider   calcitRIOL (ROCALTROL) 0.25 mcg capsule Take 0.25 mcg by mouth two (2) days a week. Patient takes on Monday and Thursday   Yes Historical Provider   benzonatate (TESSALON PERLES) 100 mg capsule Take 100 mg by mouth three (3) times daily as needed for Cough. Yes Historical Provider   guaiFENesin-codeine (CHERATUSSIN AC) 100-10 mg/5 mL solution Take 5 mL by mouth three (3) times daily as needed for Cough. Yes Historical Provider   isosorbide mononitrate ER (IMDUR) 120 mg CR tablet TAKE 1 TABLET BY MOUTH EVERY DAY 11/21/16  Yes Klaus Kern MD   bumetanide (BUMEX) 2 mg tablet Take two tablets twice a day. 11/10/16  Yes Klaus Kern MD   carvedilol (COREG) 25 mg tablet TAKE 1 TABLET BY MOUTH TWICE A DAY 10/16/16  Yes Klaus Kern MD   losartan (COZAAR) 25 mg tablet TAKE 1 TABLET BY MOUTH ONCE A DAY 9/16/16  Yes Janette Nix NP   clopidogrel (PLAVIX) 75 mg tablet TAKE 1 TABLET BY MOUTH EVERY DAY 9/8/16  Yes Klaus Kern MD   pantoprazole (PROTONIX) 40 mg tablet Take 1 Tab by mouth daily.  Indications: GASTROESOPHAGEAL REFLUX 16  Yes Jerri Roy MD   nitroglycerin (NITROSTAT) 0.3 mg SL tablet 1 Tab by SubLINGual route every five (5) minutes as needed for Chest Pain. 16  Yes Randa Smith MD   INSULIN LISPRO (HUMALOG SC) by SubCUTAneous route Before breakfast, lunch, and dinner. Sliding scale. Yes Abhijit Frey MD   oxyCODONE-acetaminophen (PERCOCET 7.5) 7.5-325 mg per tablet Take 1 Tab by mouth every four (4) hours as needed. 16  Yes Historical Provider   amLODIPine (NORVASC) 10 mg tablet Take 10 mg by mouth daily. Yes Historical Provider   ergocalciferol (VITAMIN D2) 50,000 unit capsule Take 50,000 Units by mouth every Tuesday and Thursday. Yes Abhijit Frey MD   aspirin 81 mg tablet Take 81 mg by mouth daily. Yes Abhijit Frey MD   atorvastatin (LIPITOR) 80 mg tablet Take 80 mg by mouth every evening. Yes Historical Provider     Allergies   Allergen Reactions    Contrast Dye [Iodine] Anaphylaxis    Levaquin [Levofloxacin] Nausea and Vomiting    Morphine Hives and Itching       Objective:     Vitals:  Blood pressure 117/56, pulse 97, temperature 98.1 °F (36.7 °C), resp. rate 18, height 5' 10\" (1.778 m), weight 152 kg (335 lb 1.6 oz), SpO2 99 %, not currently breastfeeding. Temp (24hrs), Av.1 °F (36.7 °C), Min:98 °F (36.7 °C), Max:98.2 °F (36.8 °C)      Intake and Output:          Physical Exam:                Patient is intubated:  no    Physical Examination:   GENERAL ASSESSMENT: well developed and well nourished  SKIN: normal color, no lesions  HEAD: normocephalic  EYES: normal eyes  NECK: normal  CHEST: normal air exchange, rhonchi bl, wheezes bl  HEART: regular rate and rhythm, normal S1/S2  ABDOMEN: soft, non-distended, no masses, no hepatosplenomegaly  :Myers: no  EXTREMITY: normal and symmetric movement, normal range of motion, no joint swelling  NEURO: gross motor exam normal by observation      ECG/rhythm[de-identified] Rev:yes  Xray/CT/US/MRI REV:yes  1.  There are new increased parenchymal opacities identified most pronounced  anteriorly in the right upper lobe but also in the left upper lobe and to a  lesser extent superior segment of the left lower lobe. These findings  demonstrate increased groundglass opacities this could be due to alveolitis this  could be due to mild acute infection. This may merely be progression of the  previous disease as the areas of opacity are in a similar distribution as was  previously noted. 2. Bibasilar honeycombing right greater than left is unchanged as is bibasilar  bronchiectasis. 3. Severe coronary calcification. 4. Enlarged main pulmonary segment suggest pulmonary artery hypertension. 5. Increase in size of pericardial effusion. Clinical correlation is suggested. .  Data Review   Recent Results (from the past 72 hour(s))   CBC WITH AUTOMATED DIFF    Collection Time: 02/02/17  3:20 PM   Result Value Ref Range    WBC 13.9 (H) 3.6 - 11.0 K/uL    RBC 2.84 (L) 3.80 - 5.20 M/uL    HGB 8.5 (L) 11.5 - 16.0 g/dL    HCT 25.9 (L) 35.0 - 47.0 %    MCV 91.2 80.0 - 99.0 FL    MCH 29.9 26.0 - 34.0 PG    MCHC 32.8 30.0 - 36.5 g/dL    RDW 14.3 11.5 - 14.5 %    PLATELET 460 041 - 964 K/uL    NEUTROPHILS 84 (H) 32 - 75 %    LYMPHOCYTES 14 12 - 49 %    MONOCYTES 2 (L) 5 - 13 %    EOSINOPHILS 0 0 - 7 %    BASOPHILS 0 0 - 1 %    ABS. NEUTROPHILS 11.6 (H) 1.8 - 8.0 K/UL    ABS. LYMPHOCYTES 2.0 0.8 - 3.5 K/UL    ABS. MONOCYTES 0.3 0.0 - 1.0 K/UL    ABS. EOSINOPHILS 0.0 0.0 - 0.4 K/UL    ABS.  BASOPHILS 0.0 0.0 - 0.1 K/UL   METABOLIC PANEL, COMPREHENSIVE    Collection Time: 02/02/17  3:20 PM   Result Value Ref Range    Sodium 145 136 - 145 mmol/L    Potassium 3.4 (L) 3.5 - 5.1 mmol/L    Chloride 107 97 - 108 mmol/L    CO2 25 21 - 32 mmol/L    Anion gap 13 5 - 15 mmol/L    Glucose 84 65 - 100 mg/dL    BUN 57 (H) 6 - 20 MG/DL    Creatinine 3.25 (H) 0.55 - 1.02 MG/DL    BUN/Creatinine ratio 18 12 - 20      GFR est AA 18 (L) >60 ml/min/1.73m2    GFR est non-AA 15 (L) >60 ml/min/1.73m2    Calcium 8.0 (L) 8.5 - 10.1 MG/DL    Bilirubin, total 0.3 0.2 - 1.0 MG/DL    ALT (SGPT) 17 12 - 78 U/L    AST (SGOT) 11 (L) 15 - 37 U/L    Alk. phosphatase 79 45 - 117 U/L    Protein, total 6.9 6.4 - 8.2 g/dL    Albumin 2.7 (L) 3.5 - 5.0 g/dL    Globulin 4.2 (H) 2.0 - 4.0 g/dL    A-G Ratio 0.6 (L) 1.1 - 2.2     MAGNESIUM    Collection Time: 02/02/17  3:20 PM   Result Value Ref Range    Magnesium 1.2 (L) 1.6 - 2.4 mg/dL   TROPONIN I    Collection Time: 02/02/17  3:20 PM   Result Value Ref Range    Troponin-I, Qt. <0.04 <0.05 ng/mL   PROTHROMBIN TIME + INR    Collection Time: 02/02/17  3:20 PM   Result Value Ref Range    INR 1.1 0.9 - 1.1      Prothrombin time 10.7 9.0 - 11.1 sec   PTT    Collection Time: 02/02/17  3:20 PM   Result Value Ref Range    aPTT 25.4 22.1 - 32.5 sec    aPTT, therapeutic range     58.0 - 77.0 SECS   CULTURE, BLOOD    Collection Time: 02/02/17  3:20 PM   Result Value Ref Range    Special Requests: NO SPECIAL REQUESTS      Culture result: NO GROWTH AFTER 17 HOURS     LACTIC ACID, PLASMA    Collection Time: 02/02/17  3:20 PM   Result Value Ref Range    Lactic acid 1.2 0.4 - 2.0 MMOL/L   GLUCOSE, POC    Collection Time: 02/02/17  9:20 PM   Result Value Ref Range    Glucose (POC) 235 (H) 65 - 100 mg/dL    Performed by Kaylynn Mcdonald (PCT)    PTT    Collection Time: 02/02/17 11:52 PM   Result Value Ref Range    aPTT 128.6 (HH) 22.1 - 32.5 sec    aPTT, therapeutic range     58.0 - 77.0 SECS   CBC WITH AUTOMATED DIFF    Collection Time: 02/03/17  3:30 AM   Result Value Ref Range    WBC 12.3 (H) 3.6 - 11.0 K/uL    RBC 2.68 (L) 3.80 - 5.20 M/uL    HGB 7.9 (L) 11.5 - 16.0 g/dL    HCT 24.2 (L) 35.0 - 47.0 %    MCV 90.3 80.0 - 99.0 FL    MCH 29.5 26.0 - 34.0 PG    MCHC 32.6 30.0 - 36.5 g/dL    RDW 14.3 11.5 - 14.5 %    PLATELET 284 352 - 177 K/uL    NEUTROPHILS 75 32 - 75 %    LYMPHOCYTES 19 12 - 49 %    MONOCYTES 6 5 - 13 %    EOSINOPHILS 0 0 - 7 %    BASOPHILS 0 0 - 1 %    ABS. NEUTROPHILS 9.1 (H) 1.8 - 8.0 K/UL    ABS. LYMPHOCYTES 2.4 0.8 - 3.5 K/UL    ABS. MONOCYTES 0.8 0.0 - 1.0 K/UL    ABS. EOSINOPHILS 0.0 0.0 - 0.4 K/UL    ABS.  BASOPHILS 0.0 0.0 - 0.1 K/UL   HEMOGLOBIN A1C WITH EAG    Collection Time: 02/03/17  3:30 AM   Result Value Ref Range    Hemoglobin A1c 6.6 (H) 4.2 - 6.3 %    Est. average glucose 425 mg/dL   METABOLIC PANEL, BASIC    Collection Time: 02/03/17  3:30 AM   Result Value Ref Range    Sodium 140 136 - 145 mmol/L    Potassium 3.2 (L) 3.5 - 5.1 mmol/L    Chloride 104 97 - 108 mmol/L    CO2 24 21 - 32 mmol/L    Anion gap 12 5 - 15 mmol/L    Glucose 187 (H) 65 - 100 mg/dL    BUN 60 (H) 6 - 20 MG/DL    Creatinine 3.31 (H) 0.55 - 1.02 MG/DL    BUN/Creatinine ratio 18 12 - 20      GFR est AA 17 (L) >60 ml/min/1.73m2    GFR est non-AA 14 (L) >60 ml/min/1.73m2    Calcium 8.0 (L) 8.5 - 10.1 MG/DL   TROPONIN I    Collection Time: 02/03/17  3:30 AM   Result Value Ref Range    Troponin-I, Qt. <0.04 <0.05 ng/mL   PROTHROMBIN TIME + INR    Collection Time: 02/03/17  3:30 AM   Result Value Ref Range    INR 1.1 0.9 - 1.1      Prothrombin time 11.0 9.0 - 11.1 sec   PTT    Collection Time: 02/03/17  3:30 AM   Result Value Ref Range    aPTT 50.9 (H) 22.1 - 32.5 sec    aPTT, therapeutic range     58.0 - 77.0 SECS   MAGNESIUM    Collection Time: 02/03/17  3:30 AM   Result Value Ref Range    Magnesium 1.2 (L) 1.6 - 2.4 mg/dL   URINALYSIS W/ RFLX MICROSCOPIC    Collection Time: 02/03/17  5:36 AM   Result Value Ref Range    Color YELLOW/STRAW      Appearance CLEAR CLEAR      Specific gravity 1.015 1.003 - 1.030      pH (UA) 5.0 5.0 - 8.0      Protein 100 (A) NEG mg/dL    Glucose NEGATIVE  NEG mg/dL    Ketone NEGATIVE  NEG mg/dL    Bilirubin NEGATIVE  NEG      Blood NEGATIVE  NEG      Urobilinogen 0.2 0.2 - 1.0 EU/dL    Nitrites NEGATIVE  NEG      Leukocyte Esterase NEGATIVE  NEG      WBC 0-4 0 - 4 /hpf    RBC 0-5 0 - 5 /hpf    Epithelial cells FEW FEW /lpf    Bacteria NEGATIVE  NEG /hpf Hyaline cast 2-5 0 - 5 /lpf   GLUCOSE, POC    Collection Time: 02/03/17  7:25 AM   Result Value Ref Range    Glucose (POC) 165 (H) 65 - 100 mg/dL    Performed by Clydia Oppenheim        Discussed with:    Patient, Nurse and Attending/Consulting  Thank you so much to allow us to participate in this patient's care. We will follow.  : Jennyfer Lynch MD  2/3/2017      [facility]:  [email or web address]. [facility]  Ronda Daubs office:  [address], [address] [place] [address],[address]  [address]  Phone: [phone]  Fax :     [phone]    [place] office:  [address] [facility], [address] [place] [address]  Phone - [phone]  Fax - [phone]

## 2017-02-03 NOTE — PERIOP NOTES
Patient received Versed and Propofol, per CRNA Yousuf Harper. Patient transported via stretcher to Endoscopy Recovery area.

## 2017-02-03 NOTE — ROUTINE PROCESS
[facility]  1955  [id number]    Situation:  Verbal report received from: Facundo Kennedy RN  Procedure: Procedure(s):  BRONCHOSCOPY  BRONCHIAL ALVEOLAR LAVAGE    Background:    Preoperative diagnosis: airway survey  Postoperative diagnosis: Mild bilateral pneumonia    :  Dr. Linda Harper  Assistant(s): Endoscopy Technician-1: Henry Garcia  Endoscopy RN-1: Caitlin Lind RN    Specimens: * No specimens in log *  H. Pylori  no    Assessment:  Intra-procedure medications     Anesthesia gave intra-procedure sedation and medications, see anesthesia flow sheet yes    Intravenous fluids: NS@ KVO     Vital signs stable     Abdominal assessment: round and soft     Recommendation:  Discharge patient per MD order. Return to floor  Permission to share finding with family or friend n/a.

## 2017-02-03 NOTE — PROGRESS NOTES
Palliative Medicine Consult  [phone])    Patient Name: Kaela Bailey  YOB: 1955      Date of Initial Consult: 2-217  Reason for Consult: care decisions   Requesting Physician: Leeroy Giang   Primary Care Physician: None          Summary:   Kaela Bailey is a 63y.o. year old with a past history of , who was admitted on 2/2/2017 from home with a diagnosis of progressive shortness of breath, chest pain non cardiac, leg pains bilateral, LARA, bilateral lower extremity edema, improved, headache, weakness, nausea. Current medical issues leading to Palliative Medicine involvement include: progressive shortness of breath, chest pain non cardiac, leg pains bilateral, LARA, bilateral lower extremity edema, improved, headache, weakness, nausea. Palliative Diagnoses:   1. Weakness  2. Nausea  3. Shortness of breath  4. LARA  5. Headache  6. Non cardiac chest soreness  7. Edema   8. Goals of care         Plan:   1. Met and discussed goals of care with patient, she wants to continue supportive care and treatments and full code. She states she has had ILD for a long time. She was admitted for hypoxia and respiratory failure worsening ground glass opacification of the lungs and LARA. She had been on ABX over the last few weeks. She does complain of chest tightness that she states is related to her lung problems. Abdominal pain due to a hx of constipation and  Leg pain due to edema. 2. Decisions made: continue supportive care and treatments  3. Pain/symptom management[de-identified] changed Norco to oxycodone 10 mg q 3hrs prn for pain, she also complains of nausea and constipation, added laxatives, takes linzess at home but not on our formulary, added miralax and pericolace , zofran as needed for nausea  4. Coordination of care: Palliative IDT, primary nurse, case management  5.  Psychosocial and emotional aspects of care: provided emotional support, active listening and non-abandonment  6. Disposition: to be determined      Goals of Care:          TREATMENT PREFERENCES:   Code Status: Full Code    Advance Care Planning:  Advance Care Planning 2/2/2017   Patient's Healthcare Decision Maker is: Legal Next Kota Gann   Primary Decision Maker Name Janny Ayala   Primary Decision Maker Phone Number [phone]   Confirm Advance Directive None       Other:    The palliative care team has discussed with patient / health care proxy about goals of care / treatment preferences for patient.  [====Goals of Care====]           Resuscitation Status: Full Code   Durable DNR Status: na  Initial Consult Note routed to PCP? [x] Yes   [] No    [] No PCP listed          Thank you for including Palliative Medicine in this patient's care. Emmett Conteh, NP           HISTORY:     History obtained from: chart, physician, nursing    CHIEF COMPLAINT: SOB      HISTORY OF PRESENT ILLNESS:   Ms. Collins Kumari is a 63 y.o.  female with a hx of ILD, HFpEF, DM2, CKD stage 4 who is admitted with worsening SOB and hypoxemia. Ms. Collins Kumari presented to the Emergency Department today complaining of gradual worsening of SOB on her chronic hypoxemic respiratory failure over the course of 2-3 weeks. Patient was seen in our ED 2 weeks ago and dx with CAP and given levaquin. [facility]en [facility] a few days later for worsen sx. Since that time, she has been on several abx, remained on steroids, has seen her pulmonologist and has not felt any better.        The patient is: [x] Verbal / [] Nonverbal / [] Unresponsive                FUNCTIONAL ASSESSMENT:     Palliative Performance Scale (PPS):  PPS: 60         PSYCHOSOCIAL/SPIRITUAL SCREENING:     Advance Care Planning:  Advance Care Planning 2/2/2017   Patient's Healthcare Decision Maker is: Legal Next Kota Gann   Primary Decision Maker Name Janny Ayala   Primary Decision Maker Phone Number [phone]   Confirm Advance Directive None        Any spiritual / Faith concerns:  [] Yes /  [x] No    Caregiver Burnout:  [] Yes /  [x] No /  [] No Caregiver Present      Anticipatory grief assessment:   [x] Normal  / [] Maladaptive       ESAS Anxiety: Anxiety: 2    ESAS Depression: Depression: 0                 REVIEW OF SYSTEMS:     The following systems were [x] reviewed / [] unable to be reviewed  Systems: constitutional, ears/nose/mouth/throat, respiratory, gastrointestinal, musculoskeletal, neurologic, psychiatric, endocrine. Positive findings noted below. Modified ESAS Completed by: provider   Fatigue: 3 Drowsiness: 0   Depression: 0 Pain: 3   Anxiety: 2 Nausea: 2   Anorexia: 0 Dyspnea: 3     Constipation: Yes                    Functional Assessment Staging (FAST) for dementia:                     Confusion Assessment Method Diagnostic Algorithm (CAM):    CAM Score: 0    [++++ Clinical Pain Assessment++++]  Location (including laterality): chest and legs  Quality: dull achy  Severity: 3/10  Acuity (acute/chronic): acute  Tolerable (yes/no): yes  Etiology, if known: edema, lung disease  Impact (functional, psychological): both  Other:  [++++ Clinical Pain Assessment++++]    Clinical Pain Assessment (nonverbal scale for severity on nonverbal patients):   [++++ Clinical Pain Assessment++++]  [++++Pain Severity++++]: Pain: 3  [++++Pain Character++++]:   [++++Pain Duration++++]:   [++++Pain Effect++++]:   [++++Pain Factors++++]:   [++++Pain Frequency++++]:   [++++Pain Location++++]:   [++++ Clinical Pain Assessment++++]       PHYSICAL EXAM:     Wt Readings from Last 3 Encounters:   02/03/17 335 lb 1.6 oz (152 kg)   01/25/17 328 lb (148.8 kg)   10/14/16 347 lb 3.2 oz (157.5 kg)     Blood pressure 132/71, pulse 62, temperature 97.9 °F (36.6 °C), resp. rate 10, height 5' 10\" (1.778 m), weight 335 lb 1.6 oz (152 kg), SpO2 100 %, not currently breastfeeding.   Pain:  Pain Scale 1: Numeric (0 - 10)  Pain Intensity 1: 7  Pain Onset 1: weeks  Pain Location 1: Chest  Pain Orientation 1: Anterior, Mid, Right  Pain Description 1: Heaviness  Pain Intervention(s) 1: Medication (see MAR)           Last bowel movement: prior to admission    Constitutional: pt is awake alert and verbal  Eyes: pupils equal, anicteric  ENMT: no nasal discharge, moist mucous membranes  Cardiovascular: regular rhythm, distal pulses intact  Respiratory: breathing not labored, symmetric  Gastrointestinal: soft non-tender, +bowel sounds  Musculoskeletal: no deformity, no tenderness to palpation  Skin: warm, dry  Neurologic:pt is able to follow commands, pt is moving all extremities  Psychiatric: full affect           HISTORY:     Active Problems:    CAD (coronary Artery Disease) Native Artery (2/16/2011)      Overview: [place] 2004      1v CAD by cath - PCI OM with SAL      Cath 5/2004 - patent stent, no new disease      Lexiscan cardiolite 12/09- normal perfusion, EF 66%      Cath: 3/19/12: PA 55/30 mean 41, wedge 26, RA 24, EDP 35, LVG 55%, LM       normal, LAD normal, LCX - OM1 patent stent, OM2 prox 85% long, %       with L to R collaterals             Interstitial lung disease (HCC) (2/28/2011)      Pulmonary HTN ([address] (3/21/2012)      HTN (hypertension) (10/1/2012)      Morbid obesity (10/1/2012)      Chronic diastolic heart failure (HCC) (10/5/2012)      CKD (chronic kidney disease) stage 3, GFR 30-59 ml/min (10/5/2012)      Normocytic anemia (5/26/2013)      DM (diabetes mellitus), type 2 with renal complications ([address] (6/0/0471)      DVT (deep venous thrombosis) ([address] (2/2/2017)      Past Medical History   Diagnosis Date     Sleep Apnea 2/16/2011    Angina, class III ([address] 8/9/2013    Aortic aneurysm (HCC)     CAD (coronary Artery Disease) Native Artery 2/16/2011    CKD (chronic kidney disease) stage 3, GFR 30-59 ml/min 10/5/2012    Diabetic gastroparesis (HCC)     Diastolic heart failure ([address] 10/5/2012    Esophageal stricture 2012     dialted Dr. Marinelli Pall G6PD deficiency [facility])      trait  GERD (gastroesophageal reflux disease)     Hypertensive Cardiovasc Dis Benign, No CHF 2/16/2011    ILD (interstitial lung disease) ([facility]      open lung bx CJW 2010    OA (osteoarthritis)     Obesity 2/16/2011    Ovarian cancer ([facility]      cervical and uterine    Rheumatoid arteritis ([facility]     T2DM (type 2 diabetes mellitus) ([facility] 8/9/2013    Tobacco use disorder 3/21/2012    Uterine cervix cancer ([facility]     Vitamin D deficiency 8/9/2013      Past Surgical History   Procedure Laterality Date    Hx orthopaedic  11/12/12     right knee replacement    Hx hysterectomy      Hx cholecystectomy      Hx appendectomy      Hx hernia repair      Hx carpal tunnel release       bilateral    Hx tonsil and adenoidectomy      Pr cardiac surg procedure unlist       stents    Upper gi endoscopy,dilatn w guide  6/24/2016          Colonoscopy N/A 6/24/2016     COLONOSCOPY performed by Hunter Smith MD at [facility] ENDOSCOPY      Family History   Problem Relation Age of Onset    Heart Disease Mother     Heart Disease Brother       Social History   Substance Use Topics    Smoking status: Former Smoker     Packs/day: 0.50     Years: 41.00     Types: Cigarettes     Quit date: 11/9/2014    Smokeless tobacco: Never Used    Alcohol use No     Allergies   Allergen Reactions    Contrast Dye [Iodine] Anaphylaxis    Levaquin [Levofloxacin] Nausea and Vomiting    Morphine Hives and Itching      Current Facility-Administered Medications   Medication Dose Route Frequency    [START ON 2/4/2017] bumetanide (BUMEX) tablet 1 mg  1 mg Oral DAILY    methylPREDNISolone (PF) (SOLU-MEDROL) injection 40 mg  40 mg IntraVENous Q8H    oxyCODONE IR (ROXICODONE) tablet 10 mg  10 mg Oral Q3H PRN    magnesium sulfate 2 g/50 ml IVPB (premix or compounded)  2 g IntraVENous ONCE    magnesium sulfate 1 g/100 ml IVPB (premix or compounded)  1 g IntraVENous ONCE    [START ON 2/4/2017] polyethylene glycol (MIRALAX) packet 17 g  17 g Oral DAILY    [START ON 2/4/2017] senna-docusate (PERICOLACE) 8.6-50 mg per tablet 2 Tab  2 Tab Oral DAILY    sodium chloride (NS) flush 5-10 mL  5-10 mL IntraVENous PRN    sodium chloride (NS) flush 5-10 mL  5-10 mL IntraVENous Q8H    sodium chloride (NS) flush 5-10 mL  5-10 mL IntraVENous PRN    sodium chloride (NS) flush 5-10 mL  5-10 mL IntraVENous Q8H    sodium chloride (NS) flush 5-10 mL  5-10 mL IntraVENous PRN    zolpidem (AMBIEN) tablet 5 mg  5 mg Oral QHS PRN    naloxone (NARCAN) injection 0.4 mg  0.4 mg IntraVENous PRN    ondansetron (ZOFRAN) injection 4 mg  4 mg IntraVENous Q4H PRN    glucose chewable tablet 16 g  4 Tab Oral PRN    dextrose (D50W) injection syrg 12.5-25 g  12.5-25 g IntraVENous PRN    glucagon (GLUCAGEN) injection 1 mg  1 mg IntraMUSCular PRN    insulin lispro (HUMALOG) injection   SubCUTAneous AC&HS    isosorbide mononitrate ER (IMDUR) tablet 120 mg  120 mg Oral DAILY    carvedilol (COREG) tablet 25 mg  25 mg Oral BID WITH MEALS    clopidogrel (PLAVIX) tablet 75 mg  75 mg Oral DAILY    pantoprazole (PROTONIX) tablet 40 mg  40 mg Oral DAILY    amLODIPine (NORVASC) tablet 10 mg  10 mg Oral DAILY    atorvastatin (LIPITOR) tablet 80 mg  80 mg Oral QPM    Warfarin pharmacy to dose   Other Rx Dosing/Monitoring    albuterol-ipratropium (DUO-NEB) 2.5 MG-0.5 MG/3 ML  3 mL Nebulization Q6H RT    benzonatate (TESSALON) capsule 100 mg  100 mg Oral TID PRN    fluticasone (FLONASE) 50 mcg/actuation nasal spray 2 Spray  2 Spray Both Nostrils QHS    temazepam (RESTORIL) capsule 30 mg  30 mg Oral QHS PRN    sucralfate (CARAFATE) tablet 1 g  1 g Oral AC&HS    heparin 25,000 units in D5W 250 ml infusion  9.8-36 Units/kg/hr IntraVENous TITRATE    influenza vaccine 2016-17 (36mos+)(PF) (FLUZONE/FLUARIX/FLULAVAL QUAD) injection 0.5 mL  0.5 mL IntraMUSCular PRIOR TO DISCHARGE          LAB AND IMAGING FINDINGS:     Lab Results   Component Value Date/Time    WBC 12.3 02/03/2017 03:30 AM HGB 7.9 02/03/2017 03:30 AM    PLATELET 630 49/62/2075 03:30 AM     Lab Results   Component Value Date/Time    Sodium 140 02/03/2017 03:30 AM    Potassium 3.2 02/03/2017 03:30 AM    Chloride 104 02/03/2017 03:30 AM    CO2 24 02/03/2017 03:30 AM    BUN 60 02/03/2017 03:30 AM    Creatinine 3.31 02/03/2017 03:30 AM    Calcium 8.0 02/03/2017 03:30 AM    Magnesium 1.2 02/03/2017 03:30 AM    Phosphorus 3.6 11/17/2014 04:30 AM      Lab Results   Component Value Date/Time    AST (SGOT) 11 02/02/2017 03:20 PM    Alk. phosphatase 79 02/02/2017 03:20 PM    Protein, total 6.9 02/02/2017 03:20 PM    Albumin 2.7 02/02/2017 03:20 PM    Globulin 4.2 02/02/2017 03:20 PM     Lab Results   Component Value Date/Time    INR 1.1 02/03/2017 03:30 AM    Prothrombin time 11.0 02/03/2017 03:30 AM    aPTT 45.3 02/03/2017 11:15 AM      Lab Results   Component Value Date/Time    Iron 29 11/09/2014 03:30 AM    TIBC 245 11/09/2014 03:30 AM    Iron % saturation 12 11/09/2014 03:30 AM    Ferritin 255 11/09/2014 04:00 AM      No results found for: PH, PCO2, PO2  No components found for: [place]   Lab Results   Component Value Date/Time    CK 72 07/01/2015 07:42 PM    CK - MB <0.5 07/01/2015 07:42 PM                Total time: 55  Counseling / coordination time: 45  > 50% counseling / coordination?: y      Prolonged service was provided for  []30 min   []75 min in face to face time in the presence of the patient. Time Start:   Time End:   Note: this can only be billed with 93139 (initial) or 07281 (follow up). If multiple start / stop times, list each separately.

## 2017-02-03 NOTE — CDMP QUERY
There is noted documentation of DVT in the medical record. Please clarify if this condition could be further specified as:      ? Acute DVT  ? Acute Recurrent DVT  ? Chronic DVT  ? Personal History of DVT    The medical record reflects the following risk factors, clinical indicators, and treatment    64 y/o female previously diagnosed with DVT, unsure which leg. Presents after instructed by PCP to rule out PE. Lower extremity dopplers done this admission show DVT of R tibial vein. ED physician notes \"acute DVT of R tibial vein\". Being treated with continuous heparin drip. Reference:  Acute DVT:  \"Acute\" defines the period of time beginning with the initial diagnosis, up to and including the entire period of time where anticoagulation is instituted (3-12 months)     Acute Recurrent DVT: \"Recurrent\" incorporates the definition of acute with the diagnosis of recurrent and ends 3-12 months beyond that time. These patients will likely require lifelong anticoagulation therapy. Chronic DVT:  \"Chronic\" treatment period extending beyond initial 12 months of treatment. Medical condition still exists and is actively being treated. Personal History of DVT: explains the patient's past medical condition that no longer exists, and is not receiving any treatment, but that has the potential for recurrence, and therefore may require monitoring    Please clarify and document your clinical opinion in the progress notes and discharge summary including the definitive and/or presumptive diagnosis, (suspected or probable), related to the above clinical findings. Please include clinical findings supporting your diagnosis. Thanks for your time.     Kadeem Knott RN, BSN  [phone]

## 2017-02-03 NOTE — PROGRESS NOTES
7:10 PM Patient sleep with a cpap setting 10 at home, called Dr. Sarina Dunlap MD states to order cpap at 10  7:17 PM Heparin infusion started at 5:30PM on 5th floor, patient also had 10,000 unit bolus and no heparin in ED per Georgette Dyer ED RN.  Noticed order to obtain baseline prior to giving heparin, Called quentin Gabriel MD of heparin infusion starting at 5:30 PM on 5th floor, MD states OK to not obtain baseline, as baseline was obtained in ED at [facility]. Informed MD that we will check APTT at 11:30PM.

## 2017-02-03 NOTE — DIABETES MGMT
DTC Consult Note     POC Glucose last 24hrs:     Lab Results   Component Value Date/Time     (H) 02/03/2017 03:30 AM    GLU 84 02/02/2017 03:20 PM    GLUCPOC 137 (H) 02/03/2017 11:35 AM    GLUCPOC 165 (H) 02/03/2017 07:25 AM    GLUCPOC 235 (H) 02/02/2017 09:20 PM        Recommendations/ Comments: Pt was having a headache, pre-bronchoscopy. Consult received from [facility],   for  [x]    Assessment of home management  []    Meal planning  []    Meter / Monitoring  []    New Diagnosis  []    Insulin education  []    Insulin pump notification  [x]    Medication recommendations  [x]    Hospital glucose management  []    Kandy Quijano  []    Outpatient Education - Information forwarded to [employer] [facility] who will follow-up with pt 1 week after discharge     Hospital (inpatient) medications:  1. Correction Scale: Lispro (Humalog) Insulin Resistant Sensitivity scale to cover for glucose > 139 mg/dL before meals and for glucose >199 at bedtime      Assessment / Plan:     Chart [facility] Kesha Garduno is a 62 yo AA female with a past medical history significant for  DM type 2, per Dr. Jimmy Marie H&P dated 2/2/2017. She sees Dr. Annemarie Kim MD [facility]) on an outpatient basis. Per patient home medications are  1. Novolog Mix 70/30 - on a sliding scale after meals  For glucose <150 - 60 units; will add 20 units for every 50 points above 150.                  A1C:   Lab Results   Component Value Date/Time    Hemoglobin A1c 6.6 02/03/2017 03:30 AM    Hemoglobin A1c 5.2 11/09/2014 04:00 AM       Assessed and instructed patient on the following  · Diabetes: disease process   · Blood sugar goals   · Causes of high / low blood glucose and treatment  · Lab results: A1C - target   · Blood sugar monitoring: Mrs. Arturo Hart monitors blood glucose at home 3 (times) per day, and denies difficulty obtaining diabetes supplies. Patient reports glucose readings are:  Lately in the 500's for the past 3 weeks, since she started taking steroids. She states she goes through a pen (300 units) every day. She has a OneTouch Ultra 2 meter,    · Site rotation  · Use of insulin pen  · Self-injection of insulin   · Use of sliding scale / correction formula  · Use of insulin to carbohydrate ratio  · Sharps disposal  · Sick day guidelines  · Meal planning: currently, pt eats 2 meals a day - lunch and dinner only. She has been taking her insulin 3 times a day lately 2' high blood sugars. · Activity guidelines   · Foot care  · Chronic complications and Standards of Care  · Delayed healing     Encouraged the following:   · dietary modifications: 3 meals a day, regular blood sugar monitoring: 3 times daily    Provided patient with the following: [x]    Diabetes Self-Care Guide                [x]    Outpatient DTC contact number               []    Glucometer -                [x]    Insulin Education Guide               []    Carbohydrate Counting Guide               []    Sample meal plan                 []    Chair exercises guide               []    [employer] Product Catalog              Thank you.     Hannah Benitez, Certified Diabetes Educator    [phone]

## 2017-02-03 NOTE — ROUTINE PROCESS
Bedside and Verbal shift change report given to CRISTINA Chang (oncoming nurse) by Lisa Sheth (offgoing nurse). Report included the following information SBAR and Kardex.

## 2017-02-03 NOTE — PROGRESS NOTES
[facility] Dosing Services  Consult for Warfarin Dosing by Pharmacy by Dr. Garry Pickens  Indication: Acute DVT  Day of Therapy: 2  Dose to achieve an INR goal of 2-3    Order entered for warfarin 5 mg PO today at 18:00. Significant drug interactions: clopidogrel, heparin bridge  Previous dose given Warfarin 5 mg   PT/INR Lab Results   Component Value Date/Time    INR 1.1 02/03/2017 03:30 AM      Platelets Lab Results   Component Value Date/Time    PLATELET 012 25/69/9153 03:30 AM      H/H Lab Results   Component Value Date/Time    HGB 7.9 02/03/2017 03:30 AM        Pharmacy will follow daily and will provide subsequent warfarin dosing based on clinical status.     Thank you,  Angela Garza, PharmD, [facility]

## 2017-02-03 NOTE — PROGRESS NOTES
Problem: Self Care Deficits Care Plan (Adult)  Goal: *Acute Goals and Plan of Care (Insert Text)  Occupational Therapy Goals  Initiated 2/3/2017  1. Patient will perform lower body dressing with independence within 7 day(s). 2. Patient will perform toilet transfers with independence within 7 day(s). 3. Patient will participate in upper extremity therapeutic exercise/activities with independence for 10 minutes within 7 day(s). 4. Patient will utilize energy conservation techniques during functional activities with verbal cues within 7 day(s). OCCUPATIONAL THERAPY EVALUATION  Patient: Jeffrey Chris (45 y.o. female)  Date: 2/3/2017  Primary Diagnosis: DVT (deep venous thrombosis) ([facility])        Precautions:          ASSESSMENT :  Based on the objective data described below, the patient presents with minimum assistance to independence for basic ADLs. ADLs are currently limited by SOB with increased activity. Patient reports using O2 at home and SOB is not new, but reports it feels worse than normal.  Patient able to perform all ADL tasks without physical assistance of therapist with exception of snapping hospital gown around left arm due to IV. Patient reports she does well at home and does not require assist of family for ADL tasks. Patient will benefit from continued OT services to increase strength and activity tolerance. Patient will benefit from skilled intervention to address the above impairments.   Patients rehabilitation potential is considered to be Good  Factors which may influence rehabilitation potential include:   [X]             None noted  [ ]             Mental ability/status  [ ]             Medical condition  [ ]             Home/family situation and support systems  [ ]             Safety awareness  [ ]             Pain tolerance/management  [ ]             Other:        PLAN :  Recommendations and Planned Interventions:  [X]               Self Care Training                  [X] Therapeutic Activities  [X]               Functional Mobility Training    [ ]        Cognitive Retraining  [X]               Therapeutic Exercises           [X]        Endurance Activities  [ ]               Balance Training                   [ ]        Neuromuscular Re-Education  [ ]               Visual/Perceptual Training     [X]   Home Safety Training  [X]               Patient Education                 [X]        Family Training/Education  [ ]               Other (comment):     Frequency/Duration: Patient will be followed by occupational therapy 3 times a week to address goals. Discharge Recommendations: none  Further Equipment Recommendations for Discharge: none noted       SUBJECTIVE:   Patient stated I have a walk in shower, my son installed .       OBJECTIVE DATA SUMMARY:   HISTORY:   Past Medical History   Diagnosis Date     Sleep Apnea 2/16/2011    Angina, class III ([address] 8/9/2013    Aortic aneurysm ([address]      CAD (coronary Artery Disease) Native Artery 2/16/2011    CKD (chronic kidney disease) stage 3, GFR 30-59 ml/min 10/5/2012    Diabetic gastroparesis (HCC)      Diastolic heart failure ([address] 10/5/2012    Esophageal stricture 2012       dialted Dr. Maged York G6PD deficiency ([address]         trait    GERD (gastroesophageal reflux disease)      Hypertensive Cardiovasc Dis Benign, No CHF 2/16/2011    ILD (interstitial lung disease) ([address]         open lung bx CJW 2010    OA (osteoarthritis)      Obesity 2/16/2011    Ovarian cancer ([address]         cervical and uterine    Rheumatoid arteritis ([address]      T2DM (type 2 diabetes mellitus) ([address] 8/9/2013    Tobacco use disorder 3/21/2012    Uterine cervix cancer ([address]      Vitamin D deficiency 8/9/2013     Past Surgical History   Procedure Laterality Date    Hx orthopaedic   11/12/12       right knee replacement    Hx hysterectomy        Hx cholecystectomy        Hx appendectomy        Hx hernia repair        Hx carpal tunnel release bilateral    Hx tonsil and adenoidectomy        Pr cardiac surg procedure unlist           stents    Upper gi endoscopy,demetrius w guide   6/24/2016            Colonoscopy N/A 6/24/2016       COLONOSCOPY performed by Ricardo Harper MD at [address] [place] [address] 10        Prior Level of Function/Home Situation: patient reports able to complete all ADLs without assistance within home   Expanded or extensive additional review of patient history:      Home Situation  Home Environment: Private residence  # Steps to Enter: 2  Rails to Enter: No  One/Two Story Residence: One story  Living Alone: No  Support Systems: Spouse/Significant Other/Partner, Family member(s)  Patient Expects to be Discharged to[de-identified] Private residence  Current DME Used/Available at Home: CPAP, Cane, straight, Raised toilet seat, Shower chair, Walker, rolling  Tub or Shower Type: Shower  [X]  Right hand dominant             [ ]  Left hand dominant     EXAMINATION OF PERFORMANCE DEFICITS:  Cognitive/Behavioral Status:  Neurologic State: Alert  Orientation Level: Oriented X4  Cognition: Appropriate decision making  Perception: Appears intact  Perseveration: No perseveration noted  Safety/Judgement: Awareness of environment  Skin: intact as seen  Edema: none noted in bilateral UEs   Vision/Perceptual:       Range of Motion:  AROM: Within functional limits     Strength:  Strength: Generally decreased, functional        Coordination:     Fine Motor Skills-Upper: Left Intact; Right Intact    Gross Motor Skills-Upper: Left Intact; Right Intact  Tone & Sensation:  Normal tone  Sensation intact in bilateral UEs         Balance:  Sitting: Intact  Standing: Impaired  Standing - Static: Good  Standing - Dynamic : Fair     Functional Mobility and Transfers for ADLs:  Bed Mobility:  Supine to Sit: Contact guard assistance  Sit to Supine: Independent  Scooting: Independent     Transfers:  Sit to Stand: Supervision  Stand to Sit: Supervision (verbal cues )  Toilet Transfer : Supervision     ADL Assessment:  Feeding: Independent (currently NPO for testing later today, simulated )     Oral Facial Hygiene/Grooming: Supervision     Bathing: Supervision     Upper Body Dressing: Minimum assistance (managment of IV line on LUE)     Lower Body Dressing: Supervision (donning socks only)     Toileting: Supervision        ADL Intervention and task modifications:        Cognitive Retraining  Safety/Judgement: Awareness of environment     Functional Measure:  Barthel Index:      Bathin  Bladder: 10  Bowels: 10  Groomin  Dressing: 10  Feeding: 10  Mobility: 10  Stairs: 5  Toilet Use: 10  Transfer (Bed to Chair and Back): 10  Total: 85         Barthel and G-code impairment scale:  Percentage of impairment CH  0% CI  1-19% CJ  20-39% CK  40-59% CL  60-79% CM  80-99% CN  100%   Barthel Score 0-100 100 99-80 79-60 59-40 20-39 1-19    0   Barthel Score 0-20 20 17-19 13-16 9-12 5-8 1-4 0      The Barthel ADL Index: Guidelines  1. The index should be used as a record of what a patient does, not as a record of what a patient could do. 2. The main aim is to establish degree of independence from any help, physical or verbal, however minor and for whatever reason. 3. The need for supervision renders the patient not independent. 4. A patient's performance should be established using the best available evidence. Asking the patient, friends/relatives and nurses are the usual sources, but direct observation and common sense are also important. However direct testing is not needed. 5. Usually the patient's performance over the preceding 24-48 hours is important, but occasionally longer periods will be relevant. 6. Middle categories imply that the patient supplies over 50 per cent of the effort. 7. Use of aids to be independent is allowed. Neil Pardo., Barthel, D.W. (0323). Functional evaluation: the Barthel Index. [facility] (14)2.   YOVANNY Jung, Courtney Rodrigez., Corrine Estrada., Angel Luna. (1999). Measuring the change indisability after inpatient rehabilitation; comparison of the responsiveness of the Barthel Index and Functional [place] Measure. Journal of Neurology, Neurosurgery, and Psychiatry, 66(4), 913-263. CON Casarez, JENIFFER Smith, & Cami Enciso M.A. (2004.) Assessment of post-stroke quality of life in cost-effectiveness studies: The usefulness of the Barthel Index and the EuroQoL-5D. Quality of Life Research, 13, 884-22         G codes: In compliance with CMSs Claims Based Outcome Reporting, the following G-code set was chosen for this patient based on their primary functional limitation being treated: The outcome measure chosen to determine the severity of the functional limitation was the Barthel Index with a score of 85/100 which was correlated with the impairment scale. · Self Care:               - CURRENT STATUS:    CI - 1%-19% impaired, limited or restricted               - GOAL STATUS:           CH - 0% impaired, limited or restricted               - D/C STATUS:                       ---------------To be determined---------------      Occupational Therapy Evaluation Charge Determination   History Examination Decision-Making   LOW Complexity : Brief history review  LOW Complexity : 1-3 performance deficits relating to physical, cognitive , or psychosocial skils that result in activity limitations and / or participation restrictions  MEDIUM Complexity : Patient may present with comorbidities that affect occupational performnce.  Miniml to moderate modification of tasks or assistance (eg, physical or verbal ) with assesment(s) is necessary to enable patient to complete evaluation       Based on the above components, the patient evaluation is determined to be of the following complexity level: LOW   Pain:  Pain Scale 1: Numeric (0 - 10)  Pain Intensity 1: 0  Pain Location 1: Chest;Leg  Pain Orientation 1: Anterior;Mid;Right  Pain Description 1: Aching;Constant  Pain Intervention(s) 1: Medication (see MAR)  Activity Tolerance:   Patient SOB during activity, rest breaks as needed  Please refer to the flowsheet for vital signs taken during this treatment. After treatment:   [ ] Patient left in no apparent distress sitting up in chair  [X] Patient left in no apparent distress in bed  [X] Call bell left within reach  [X] Nursing notified  [ ] Caregiver present  [ ] Bed alarm activated      COMMUNICATION/EDUCATION:   The patients plan of care was discussed with: Physical Therapist.  [X] Home safety education was provided and the patient/caregiver indicated understanding. [X] Patient/family have participated as able in goal setting and plan of care. [X] Patient/family agree to work toward stated goals and plan of care. [ ] Patient understands intent and goals of therapy, but is neutral about his/her participation. [ ] Patient is unable to participate in goal setting and plan of care. This patients plan of care is appropriate for delegation to [facility].      Thank you for this referral.  Fallon Alvarez OTR/L  Time Calculation: 31 mins

## 2017-02-04 LAB
ALBUMIN SERPL BCP-MCNC: 2.7 G/DL (ref 3.5–5)
ANION GAP BLD CALC-SCNC: 15 MMOL/L (ref 5–15)
APTT PPP: 104.1 SEC (ref 22.1–32.5)
APTT PPP: 67.8 SEC (ref 22.1–32.5)
APTT PPP: 72.8 SEC (ref 22.1–32.5)
BASOPHILS # BLD AUTO: 0 K/UL (ref 0–0.1)
BASOPHILS # BLD: 0 % (ref 0–1)
BUN SERPL-MCNC: 75 MG/DL (ref 6–20)
BUN/CREAT SERPL: 20 (ref 12–20)
CALCIUM SERPL-MCNC: 7.6 MG/DL (ref 8.5–10.1)
CHLORIDE SERPL-SCNC: 101 MMOL/L (ref 97–108)
CO2 SERPL-SCNC: 20 MMOL/L (ref 21–32)
CREAT SERPL-MCNC: 3.78 MG/DL (ref 0.55–1.02)
CREAT UR-MCNC: 115.07 MG/DL
EOSINOPHIL # BLD: 0 K/UL (ref 0–0.4)
EOSINOPHIL NFR BLD: 0 % (ref 0–7)
ERYTHROCYTE [DISTWIDTH] IN BLOOD BY AUTOMATED COUNT: 14.5 % (ref 11.5–14.5)
FLUID CULTURE, SPNG2: NORMAL
GLUCOSE BLD STRIP.AUTO-MCNC: 378 MG/DL (ref 65–100)
GLUCOSE BLD STRIP.AUTO-MCNC: 382 MG/DL (ref 65–100)
GLUCOSE BLD STRIP.AUTO-MCNC: 393 MG/DL (ref 65–100)
GLUCOSE BLD STRIP.AUTO-MCNC: 461 MG/DL (ref 65–100)
GLUCOSE SERPL-MCNC: 384 MG/DL (ref 65–100)
HCT VFR BLD AUTO: 23.8 % (ref 35–47)
HGB BLD-MCNC: 7.9 G/DL (ref 11.5–16)
INR PPP: 1.1 (ref 0.9–1.1)
L PNEUMO1 AG UR QL IA: NEGATIVE
LYMPHOCYTES # BLD AUTO: 9 % (ref 12–49)
LYMPHOCYTES # BLD: 1.4 K/UL (ref 0.8–3.5)
MAGNESIUM SERPL-MCNC: 1.8 MG/DL (ref 1.6–2.4)
MCH RBC QN AUTO: 30 PG (ref 26–34)
MCHC RBC AUTO-ENTMCNC: 33.2 G/DL (ref 30–36.5)
MCV RBC AUTO: 90.5 FL (ref 80–99)
MONOCYTES # BLD: 0.3 K/UL (ref 0–1)
MONOCYTES NFR BLD AUTO: 2 % (ref 5–13)
NEUTS SEG # BLD: 13.9 K/UL (ref 1.8–8)
NEUTS SEG NFR BLD AUTO: 89 % (ref 32–75)
ORGANISM ID, SPNG3: NORMAL
PHOSPHATE SERPL-MCNC: 4.6 MG/DL (ref 2.6–4.7)
PLATELET # BLD AUTO: 322 K/UL (ref 150–400)
PLEASE NOTE, SPNG4: NORMAL
POTASSIUM SERPL-SCNC: 3.8 MMOL/L (ref 3.5–5.1)
PROT UR-MCNC: 63 MG/DL (ref 0–11.9)
PROTHROMBIN TIME: 11.4 SEC (ref 9–11.1)
RBC # BLD AUTO: 2.63 M/UL (ref 3.8–5.2)
S PNEUM AG SPEC QL LA: NEGATIVE
SERVICE CMNT-IMP: ABNORMAL
SODIUM SERPL-SCNC: 136 MMOL/L (ref 136–145)
SPECIMEN SOURCE: NORMAL
SPECIMEN SOURCE: NORMAL
SPECIMEN, SPNG1: NORMAL
THERAPEUTIC RANGE,PTTT: ABNORMAL SECS (ref 58–77)
WBC # BLD AUTO: 15.6 K/UL (ref 3.6–11)

## 2017-02-04 PROCEDURE — 77030013140 HC MSK NEB VYRM -A

## 2017-02-04 PROCEDURE — 74011636637 HC RX REV CODE- 636/637: Performed by: INTERNAL MEDICINE

## 2017-02-04 PROCEDURE — 74011000250 HC RX REV CODE- 250: Performed by: INTERNAL MEDICINE

## 2017-02-04 PROCEDURE — 77010033678 HC OXYGEN DAILY

## 2017-02-04 PROCEDURE — 85730 THROMBOPLASTIN TIME PARTIAL: CPT | Performed by: INTERNAL MEDICINE

## 2017-02-04 PROCEDURE — 85025 COMPLETE CBC W/AUTO DIFF WBC: CPT | Performed by: INTERNAL MEDICINE

## 2017-02-04 PROCEDURE — 93306 TTE W/DOPPLER COMPLETE: CPT

## 2017-02-04 PROCEDURE — 83735 ASSAY OF MAGNESIUM: CPT | Performed by: INTERNAL MEDICINE

## 2017-02-04 PROCEDURE — 74011250637 HC RX REV CODE- 250/637: Performed by: INTERNAL MEDICINE

## 2017-02-04 PROCEDURE — 74011250637 HC RX REV CODE- 250/637: Performed by: NURSE PRACTITIONER

## 2017-02-04 PROCEDURE — 84156 ASSAY OF PROTEIN URINE: CPT | Performed by: INTERNAL MEDICINE

## 2017-02-04 PROCEDURE — 74011250636 HC RX REV CODE- 250/636: Performed by: INTERNAL MEDICINE

## 2017-02-04 PROCEDURE — 80069 RENAL FUNCTION PANEL: CPT | Performed by: INTERNAL MEDICINE

## 2017-02-04 PROCEDURE — 65270000029 HC RM PRIVATE

## 2017-02-04 PROCEDURE — 82570 ASSAY OF URINE CREATININE: CPT | Performed by: INTERNAL MEDICINE

## 2017-02-04 PROCEDURE — 94640 AIRWAY INHALATION TREATMENT: CPT

## 2017-02-04 PROCEDURE — 82962 GLUCOSE BLOOD TEST: CPT

## 2017-02-04 PROCEDURE — 74011250636 HC RX REV CODE- 250/636: Performed by: PHYSICIAN ASSISTANT

## 2017-02-04 RX ORDER — WARFARIN SODIUM 5 MG/1
5 TABLET ORAL ONCE
Status: COMPLETED | OUTPATIENT
Start: 2017-02-04 | End: 2017-02-04

## 2017-02-04 RX ORDER — INSULIN LISPRO 100 [IU]/ML
12 INJECTION, SOLUTION INTRAVENOUS; SUBCUTANEOUS ONCE
Status: COMPLETED | OUTPATIENT
Start: 2017-02-04 | End: 2017-02-04

## 2017-02-04 RX ADMIN — IPRATROPIUM BROMIDE AND ALBUTEROL SULFATE 3 ML: .5; 2.5 SOLUTION RESPIRATORY (INHALATION) at 01:10

## 2017-02-04 RX ADMIN — SUCRALFATE 1 G: 1 TABLET ORAL at 17:17

## 2017-02-04 RX ADMIN — IPRATROPIUM BROMIDE AND ALBUTEROL SULFATE 3 ML: .5; 2.5 SOLUTION RESPIRATORY (INHALATION) at 14:38

## 2017-02-04 RX ADMIN — OXYCODONE HYDROCHLORIDE 10 MG: 5 TABLET ORAL at 00:23

## 2017-02-04 RX ADMIN — ISOSORBIDE MONONITRATE 120 MG: 30 TABLET ORAL at 08:41

## 2017-02-04 RX ADMIN — ONDANSETRON 4 MG: 2 INJECTION INTRAMUSCULAR; INTRAVENOUS at 00:23

## 2017-02-04 RX ADMIN — ONDANSETRON 4 MG: 2 INJECTION INTRAMUSCULAR; INTRAVENOUS at 22:23

## 2017-02-04 RX ADMIN — Medication 10 ML: at 13:48

## 2017-02-04 RX ADMIN — METHYLPREDNISOLONE SODIUM SUCCINATE 40 MG: 40 INJECTION, POWDER, FOR SOLUTION INTRAMUSCULAR; INTRAVENOUS at 13:48

## 2017-02-04 RX ADMIN — OXYCODONE HYDROCHLORIDE 10 MG: 5 TABLET ORAL at 21:58

## 2017-02-04 RX ADMIN — WARFARIN SODIUM 5 MG: 5 TABLET ORAL at 17:01

## 2017-02-04 RX ADMIN — HEPARIN SODIUM AND DEXTROSE 7.8 UNITS/KG/HR: 10000; 5 INJECTION INTRAVENOUS at 17:15

## 2017-02-04 RX ADMIN — BUMETANIDE 1 MG: 1 TABLET ORAL at 08:41

## 2017-02-04 RX ADMIN — INSULIN LISPRO 10 UNITS: 100 INJECTION, SOLUTION INTRAVENOUS; SUBCUTANEOUS at 17:00

## 2017-02-04 RX ADMIN — Medication 2 TABLET: at 08:42

## 2017-02-04 RX ADMIN — INSULIN LISPRO 10 UNITS: 100 INJECTION, SOLUTION INTRAVENOUS; SUBCUTANEOUS at 08:40

## 2017-02-04 RX ADMIN — INSULIN LISPRO 12 UNITS: 100 INJECTION, SOLUTION INTRAVENOUS; SUBCUTANEOUS at 12:14

## 2017-02-04 RX ADMIN — Medication 10 ML: at 21:59

## 2017-02-04 RX ADMIN — SUCRALFATE 1 G: 1 TABLET ORAL at 12:15

## 2017-02-04 RX ADMIN — Medication 10 ML: at 05:13

## 2017-02-04 RX ADMIN — SUCRALFATE 1 G: 1 TABLET ORAL at 21:58

## 2017-02-04 RX ADMIN — IPRATROPIUM BROMIDE AND ALBUTEROL SULFATE 3 ML: .5; 2.5 SOLUTION RESPIRATORY (INHALATION) at 08:32

## 2017-02-04 RX ADMIN — SUCRALFATE 1 G: 1 TABLET ORAL at 08:42

## 2017-02-04 RX ADMIN — INSULIN LISPRO 7 UNITS: 100 INJECTION, SOLUTION INTRAVENOUS; SUBCUTANEOUS at 22:45

## 2017-02-04 RX ADMIN — ATORVASTATIN CALCIUM 80 MG: 20 TABLET, FILM COATED ORAL at 17:17

## 2017-02-04 RX ADMIN — AMLODIPINE BESYLATE 10 MG: 5 TABLET ORAL at 08:42

## 2017-02-04 RX ADMIN — CARVEDILOL 25 MG: 12.5 TABLET, FILM COATED ORAL at 08:42

## 2017-02-04 RX ADMIN — CARVEDILOL 25 MG: 12.5 TABLET, FILM COATED ORAL at 16:59

## 2017-02-04 RX ADMIN — METHYLPREDNISOLONE SODIUM SUCCINATE 40 MG: 40 INJECTION, POWDER, FOR SOLUTION INTRAMUSCULAR; INTRAVENOUS at 21:58

## 2017-02-04 RX ADMIN — FLUTICASONE PROPIONATE 2 SPRAY: 50 SPRAY, METERED NASAL at 21:58

## 2017-02-04 RX ADMIN — PANTOPRAZOLE SODIUM 40 MG: 40 TABLET, DELAYED RELEASE ORAL at 08:41

## 2017-02-04 RX ADMIN — METHYLPREDNISOLONE SODIUM SUCCINATE 40 MG: 40 INJECTION, POWDER, FOR SOLUTION INTRAMUSCULAR; INTRAVENOUS at 05:12

## 2017-02-04 RX ADMIN — CLOPIDOGREL 75 MG: 75 TABLET, FILM COATED ORAL at 08:41

## 2017-02-04 RX ADMIN — IPRATROPIUM BROMIDE AND ALBUTEROL SULFATE 3 ML: .5; 2.5 SOLUTION RESPIRATORY (INHALATION) at 20:11

## 2017-02-04 RX ADMIN — POLYETHYLENE GLYCOL 3350 17 G: 17 POWDER, FOR SOLUTION ORAL at 08:42

## 2017-02-04 NOTE — ROUTINE PROCESS
Bedside and Verbal shift change report given to Pr[address] Melinda Schulz7 (oncoming nurse) by Segundo Gasca RN (offgoing nurse). Report included the following information SBAR, Kardex, MAR, Accordion and Recent Results.

## 2017-02-04 NOTE — PROGRESS NOTES
[address] [facility] [place]  YOB: 1955          Assessment & Plan:   1. ROSEANN on CKD 4  · Due to IVVF ( Poor PO, 2 Diuretics) and/or Bactrim  · Cr worse despite dc ARB  · I have not ordered Urine Eos due to low PPV   · Low FeNa despite 2 diuretics favors Pre renal azotemia  · DC Loops  · If CR < 3: resume Loops at home dose and ARB  · No HD needed, DTD eval  · Trace prt: due to DM,Obesity and smoking, No RBCs  · She is on Steroids so role of biopsy to diagnose AIN is mute:dw pt  2. CKD 4  · Cr 2.7-2/8 mg% at baseline, , Diabetic and Hypertensive Nephrosclerosis  · Hold ARB  3. DM  · Insulin  4. HTN  · BB,CCB,Thiazide,  · ARB,Loops on Hold  5. Resp Failure  · I think this is due to Exacerbation of ILD and ? PE  · I do not think this is volume  6. Obesity  · Wt loss will help  7. Smoker  · None now  8. Chronic Diastolic CHF  ·   · Decrease Loops       Subjective:   CC:ARF  HPI: Patient seen   ROSEANN is getting worse.   She is off ARB  SOB same  Edema is same  ROS:as above otherwise neg for 12 sys  Current Facility-Administered Medications   Medication Dose Route Frequency    warfarin (COUMADIN) tablet 5 mg  5 mg Oral ONCE    influenza vaccine 2016-17 (36mos+)(PF) (FLUZONE/FLUARIX/FLULAVAL QUAD) injection 0.5 mL  0.5 mL IntraMUSCular PRIOR TO DISCHARGE    bumetanide (BUMEX) tablet 1 mg  1 mg Oral DAILY    methylPREDNISolone (PF) (SOLU-MEDROL) injection 40 mg  40 mg IntraVENous Q8H    oxyCODONE IR (ROXICODONE) tablet 10 mg  10 mg Oral Q3H PRN    polyethylene glycol (MIRALAX) packet 17 g  17 g Oral DAILY    senna-docusate (PERICOLACE) 8.6-50 mg per tablet 2 Tab  2 Tab Oral DAILY    sodium chloride (NS) flush 5-10 mL  5-10 mL IntraVENous Q8H    sodium chloride (NS) flush 5-10 mL  5-10 mL IntraVENous PRN    dextrose 5% and 0.9% NaCl infusion  50 mL/hr IntraVENous CONTINUOUS    zolpidem (AMBIEN) tablet 5 mg  5 mg Oral QHS PRN  naloxone (NARCAN) injection 0.4 mg  0.4 mg IntraVENous PRN    ondansetron (ZOFRAN) injection 4 mg  4 mg IntraVENous Q4H PRN    glucose chewable tablet 16 g  4 Tab Oral PRN    dextrose (D50W) injection syrg 12.5-25 g  12.5-25 g IntraVENous PRN    glucagon (GLUCAGEN) injection 1 mg  1 mg IntraMUSCular PRN    insulin lispro (HUMALOG) injection   SubCUTAneous AC&HS    isosorbide mononitrate ER (IMDUR) tablet 120 mg  120 mg Oral DAILY    carvedilol (COREG) tablet 25 mg  25 mg Oral BID WITH MEALS    clopidogrel (PLAVIX) tablet 75 mg  75 mg Oral DAILY    pantoprazole (PROTONIX) tablet 40 mg  40 mg Oral DAILY    amLODIPine (NORVASC) tablet 10 mg  10 mg Oral DAILY    atorvastatin (LIPITOR) tablet 80 mg  80 mg Oral QPM    Warfarin pharmacy to dose   Other Rx Dosing/Monitoring    albuterol-ipratropium (DUO-NEB) 2.5 MG-0.5 MG/3 ML  3 mL Nebulization Q6H RT    benzonatate (TESSALON) capsule 100 mg  100 mg Oral TID PRN    fluticasone (FLONASE) 50 mcg/actuation nasal spray 2 Spray  2 Spray Both Nostrils QHS    temazepam (RESTORIL) capsule 30 mg  30 mg Oral QHS PRN    sucralfate (CARAFATE) tablet 1 g  1 g Oral AC&HS    heparin 25,000 units in D5W 250 ml infusion  9.8-36 Units/kg/hr IntraVENous TITRATE          Objective:     Vitals:  Blood pressure 120/72, pulse 77, temperature 98.2 °F (36.8 °C), resp. rate 18, height 5' 10\" (1.778 m), weight 152 kg (335 lb 1.6 oz), SpO2 97 %, not currently breastfeeding. Temp (24hrs), Av °F (36.7 °C), Min:97.6 °F (36.4 °C), Max:98.3 °F (36.8 °C)      Intake and Output:      1901 -  0700  In: 300 [I.V.:300]  Out: -     Physical Exam:                Patient is intubated:  no    Physical Examination:   GENERAL ASSESSMENT: NAD  HEENT:Nontraumatic   CHEST: Crackles  HEART: S1S2  ABDOMEN: Soft,NT,  :Myers: n  EXTREMITY: EDEMA 1  NEURO:Grossly non focal          ECG/rhythm:    Data Review      No results for input(s): TNIPOC in the last 72 hours.     No lab exists for component: ITNL   Recent Labs      02/03/17   0330  02/02/17   1520   TROIQ  <0.04  <0.04     Recent Labs      02/04/17   0422  02/03/17   0330  02/02/17   1520   NA  136  140  145   K  3.8  3.2*  3.4*   CL  101  104  107   CO2  20*  24  25   BUN  75*  60*  57*   CREA  3.78*  3.31*  3.25*   GLU  384*  187*  84   PHOS  4.6   --    --    MG  1.8  1.2*  1.2*   CA  7.6*  8.0*  8.0*   ALB  2.7*   --   2.7*   WBC  15.6*  12.3*  13.9*   HGB  7.9*  7.9*  8.5*   HCT  23.8*  24.2*  25.9*   PLT  322  314  346      Recent Labs      02/04/17   0730  02/03/17   2259  02/03/17   1711   02/03/17   0330   02/02/17   1520   INR   --   1.1   --    --   1.1   --   1.1   PTP   --   11.4*   --    --   11.0   --   10.7   APTT  67.8*  104.1*  47.5*   < >  50.9*   < >  25.4    < > = values in this interval not displayed. Needs: urine analysis, urine sodium, protein and creatinine  Lab Results   Component Value Date/Time    Sodium urine, random 25 11/10/2014 12:40 PM    Creatinine, urine 115.07 02/04/2017 09:33 AM         Discussed with:  pt    : Jennyfer Lynch MD  2/4/2017        [address] [place] [address] [facility]:  [email or web address]  Ronda Daubs office:  [address] [place] [address], [address] [place] [address] [address]  [place], [address] [address]  Phone: [phone]  Fax :     [phone]    [address] [place] [address] office:  [address], [address]  Phone - [phone]  Fax - [phone]

## 2017-02-04 NOTE — PROGRESS NOTES
Name: Dwight Bones: [address]   : 1955 Admit Date: 2017   Phone: [phone]  Room: [facility]   PCP: None  MRN: [id number]   Date: 2017  Code: Full Code        S/p bronch/bal yesterday, cutluers ntd, doing ok, still cough, dry, no fever          Past Medical History   Diagnosis Date     Sleep Apnea 2011    Angina, class III ([address] 2013    Aortic aneurysm ([address]     CAD (coronary Artery Disease) Native Artery 2011    CKD (chronic kidney disease) stage 3, GFR 30-59 ml/min 10/5/2012    Diabetic gastroparesis ([address]     Diastolic heart failure ([address] 10/5/2012    Esophageal stricture      dialted Dr. Faustino Angelucci G6PD deficiency ([facility]      trait    GERD (gastroesophageal reflux disease)     Hypertensive Cardiovasc Dis Benign, No CHF 2011    ILD (interstitial lung disease) ([address]      open lung bx CJW     OA (osteoarthritis)     Obesity 2011    Ovarian cancer ([address]      cervical and uterine    Rheumatoid arteritis ([address]     T2DM (type 2 diabetes mellitus) ([address] 2013    Tobacco use disorder 3/21/2012    Uterine cervix cancer ([address]     Vitamin D deficiency 2013       Past Surgical History   Procedure Laterality Date    Hx orthopaedic  12     right knee replacement    Hx hysterectomy      Hx cholecystectomy      Hx appendectomy      Hx hernia repair      Hx carpal tunnel release       bilateral    Hx tonsil and adenoidectomy      Pr cardiac surg procedure unlist       stents    Upper gi endoscopy,dilatn w guide  2016          Colonoscopy N/A 2016     COLONOSCOPY performed by Palak Santos MD at [address] [place] [address] History   Problem Relation Age of Onset    Heart Disease Mother     Heart Disease Brother        Social History   Substance Use Topics    Smoking status: Former Smoker     Packs/day: 0.50     Years: 41.00     Types: Cigarettes     Quit date: 2014    Smokeless tobacco: Never Used    Alcohol use No       Allergies   Allergen Reactions    Contrast Dye [Iodine] Anaphylaxis    Levaquin [Levofloxacin] Nausea and Vomiting    Morphine Hives and Itching       Current Facility-Administered Medications   Medication Dose Route Frequency    warfarin (COUMADIN) tablet 5 mg  5 mg Oral ONCE    influenza vaccine 2016-17 (36mos+)(PF) (FLUZONE/FLUARIX/FLULAVAL QUAD) injection 0.5 mL  0.5 mL IntraMUSCular PRIOR TO DISCHARGE    bumetanide (BUMEX) tablet 1 mg  1 mg Oral DAILY    methylPREDNISolone (PF) (SOLU-MEDROL) injection 40 mg  40 mg IntraVENous Q8H    oxyCODONE IR (ROXICODONE) tablet 10 mg  10 mg Oral Q3H PRN    polyethylene glycol (MIRALAX) packet 17 g  17 g Oral DAILY    senna-docusate (PERICOLACE) 8.6-50 mg per tablet 2 Tab  2 Tab Oral DAILY    sodium chloride (NS) flush 5-10 mL  5-10 mL IntraVENous Q8H    sodium chloride (NS) flush 5-10 mL  5-10 mL IntraVENous PRN    dextrose 5% and 0.9% NaCl infusion  50 mL/hr IntraVENous CONTINUOUS    zolpidem (AMBIEN) tablet 5 mg  5 mg Oral QHS PRN    naloxone (NARCAN) injection 0.4 mg  0.4 mg IntraVENous PRN    ondansetron (ZOFRAN) injection 4 mg  4 mg IntraVENous Q4H PRN    glucose chewable tablet 16 g  4 Tab Oral PRN    dextrose (D50W) injection syrg 12.5-25 g  12.5-25 g IntraVENous PRN    glucagon (GLUCAGEN) injection 1 mg  1 mg IntraMUSCular PRN    insulin lispro (HUMALOG) injection   SubCUTAneous AC&HS    isosorbide mononitrate ER (IMDUR) tablet 120 mg  120 mg Oral DAILY    carvedilol (COREG) tablet 25 mg  25 mg Oral BID WITH MEALS    clopidogrel (PLAVIX) tablet 75 mg  75 mg Oral DAILY    pantoprazole (PROTONIX) tablet 40 mg  40 mg Oral DAILY    amLODIPine (NORVASC) tablet 10 mg  10 mg Oral DAILY    atorvastatin (LIPITOR) tablet 80 mg  80 mg Oral QPM    Warfarin pharmacy to dose   Other Rx Dosing/Monitoring    albuterol-ipratropium (DUO-NEB) 2.5 MG-0.5 MG/3 ML  3 mL Nebulization Q6H RT    benzonatate (TESSALON) capsule 100 mg  100 mg Oral TID PRN    fluticasone (FLONASE) 50 mcg/actuation nasal spray 2 Spray  2 Spray Both Nostrils QHS    temazepam (RESTORIL) capsule 30 mg  30 mg Oral QHS PRN    sucralfate (CARAFATE) tablet 1 g  1 g Oral AC&HS    heparin 25,000 units in D5W 250 ml infusion  9.8-36 Units/kg/hr IntraVENous TITRATE         REVIEW OF SYSTEMS   Negative except as stated in the HPI. Physical Exam:   Visit Vitals    /63 (BP 1 Location: Right arm, BP Patient Position: At rest)    Pulse 76    Temp 98.1 °F (36.7 °C)    Resp 17    Ht 5' 10\" (1.778 m)    Wt 152 kg (335 lb 1.6 oz)    SpO2 98%    Breastfeeding No    BMI 48.08 kg/m2   on 3L    General:  Alert, cooperative, no distress, appears stated age. Head:  Normocephalic, without obvious abnormality, atraumatic. Eyes:  Conjunctivae/corneas clear. Nose: Nares normal. Septum midline. Mucosa normal.    Throat: Lips, mucosa, and tongue normal. Class 4 airway. Neck: Thick, supple, symmetrical, trachea midline, no adenopathy. Lungs:   Faint exp wheeze   Chest wall:  No tenderness or deformity. Heart:  Regular rate and rhythm, S1, S2 normal, no murmur, click, rub or gallop. Abdomen:   Obese, soft, non-tender. Bowel sounds normal. No masses,  No organomegaly. Extremities: Extremities normal, atraumatic, no cyanosis, + RLE edema. Pulses: 2+ and symmetric all extremities.    Skin: Skin color, texture, turgor normal. No rashes or lesions   Lymph nodes: Cervical, supraclavicular nodes normal.   Neurologic: Grossly nonfocal       Lab Results   Component Value Date/Time    Sodium 136 02/04/2017 04:22 AM    Potassium 3.8 02/04/2017 04:22 AM    Chloride 101 02/04/2017 04:22 AM    CO2 20 02/04/2017 04:22 AM    BUN 75 02/04/2017 04:22 AM    Creatinine 3.78 02/04/2017 04:22 AM    Glucose 384 02/04/2017 04:22 AM    Calcium 7.6 02/04/2017 04:22 AM    Magnesium 1.8 02/04/2017 04:22 AM    Phosphorus 4.6 02/04/2017 04:22 AM    Lactic acid 1.2 02/02/2017 03:20 PM       Lab Results   Component Value Date/Time    WBC 15.6 02/04/2017 04:22 AM    HGB 7.9 02/04/2017 04:22 AM    PLATELET 802 41/94/0897 04:22 AM    MCV 90.5 02/04/2017 04:22 AM       Lab Results   Component Value Date/Time    INR 1.1 02/03/2017 10:59 PM    aPTT 67.8 02/04/2017 07:30 AM    AST (SGOT) 11 02/02/2017 03:20 PM    Alk.  phosphatase 79 02/02/2017 03:20 PM    Protein, total 6.9 02/02/2017 03:20 PM    Albumin 2.7 02/04/2017 04:22 AM    Globulin 4.2 02/02/2017 03:20 PM       Lab Results   Component Value Date/Time    Iron 29 11/09/2014 03:30 AM    TIBC 245 11/09/2014 03:30 AM    Iron % saturation 12 11/09/2014 03:30 AM    Ferritin 255 11/09/2014 04:00 AM       No results found for: SR, CRP, GLENNY, ANAIGG, RA, RPR, RPRT, VDRLT, VDRLS, TSH, TSHEXT, TSHEXT     No results found for: PH, PHI, PCO2, PCO2I, PO2, PO2I, HCO3, HCO3I, FIO2, FIO2I    Lab Results   Component Value Date/Time    CK 72 07/01/2015 07:42 PM    CK-MB Index CANNOT BE CALCULATED 07/01/2015 07:42 PM    Troponin-I, Qt. <0.04 02/03/2017 03:30 AM        Lab Results   Component Value Date/Time    Culture result: PENDING 02/03/2017 04:10 PM    Culture result: NO GROWTH 2 DAYS 02/02/2017 03:20 PM    Culture result: NO GROWTH 2 DAYS 11/11/2014 03:30 PM       No results found for: TOXA1, RPR, HBCM, HBSAG, HAAB, HCAB, HCAB1, HAAT, G6PD, CRYAC, HIVGT, HIVR, HIV1, HIV12, HIVPC, HIVRPI    Lab Results   Component Value Date/Time    CK 72 07/01/2015 07:42 PM    CK 42 11/09/2014 03:30 AM       Lab Results   Component Value Date/Time    Color YELLOW/STRAW 02/03/2017 05:36 AM    Appearance CLEAR 02/03/2017 05:36 AM    pH (UA) 5.0 02/03/2017 05:36 AM    Protein 100 02/03/2017 05:36 AM    Glucose NEGATIVE  02/03/2017 05:36 AM    Ketone NEGATIVE  02/03/2017 05:36 AM    Bilirubin NEGATIVE  02/03/2017 05:36 AM    Blood NEGATIVE  02/03/2017 05:36 AM    Urobilinogen 0.2 02/03/2017 05:36 AM    Nitrites NEGATIVE  02/03/2017 05:36 AM    Leukocyte Esterase NEGATIVE 02/03/2017 05:36 AM    WBC 0-4 02/03/2017 05:36 AM    RBC 0-5 02/03/2017 05:36 AM    Bacteria NEGATIVE  02/03/2017 05:36 AM       IMPRESSION  · Acute/chronic hypoxic respiratory failure: ? ILD exacerbation  · RLE DVT; may also have PE, but allergic to contrast and had CKD, so not able to check CTA. Agree that V/Q not likley to be helpful in light of ILD.   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 at [facility] in 2010  · JASEN  · Chest pain  · Diastolic CHF  · Pulmonary HTN  · HTN  · Acute/chronic kidney disease stage 4  · History of tobacco use; quit 11/2014    PLAN  · Continue O2 and wean as able to keep sats > 90%  · Continue with ivcs, will check back on Monday, follow up on bronch results      Anuel Rahman MD

## 2017-02-04 NOTE — PROGRESS NOTES
[facility]  [address]  [phone]      Medical Progress Note      NAME: Edgard Finney   :  1955  MRM:  [id number]    Date/Time: 2017          Subjective:     Chief Complaint:  F/u resp failure    Chart/notes/labs/studies reviewed, patient examined at bedside. Dyspnea on exertion. About the same. Cough is nonproductive. No fevers          Objective:       Vitals:          Last 24hrs VS reviewed since prior progress note. Most recent are:    Visit Vitals    /73    Pulse 84    Temp 98.5 °F (36.9 °C)    Resp 19    Ht 5' 10\" (1.778 m)    Wt 152 kg (335 lb 1.6 oz)    SpO2 95%    Breastfeeding No    BMI 48.08 kg/m2     SpO2 Readings from Last 6 Encounters:   17 95%   17 98%   10/14/16 98%   16 94%   16 97%   16 100%    O2 Flow Rate (L/min): 3 l/min   No intake or output data in the 24 hours ending 17 7911       Exam:     Physical Exam:    Gen:  Obese, pleasant, NAD  HEENT:  Sclerae nonicteric, hearing intact to voice, mucous membranes moist  Neck:  Supple, without masses. Resp:  No accessory muscle use but increased WOB, coarse, and mild wheezing, bilateral, mild. Moving good air  Card: RRR, without m/r/g. BLE edema, R>L, 1+  Abd:  +bowel sounds, soft, NTTP, nondistended. Neuro: Face symmetric, tongue midline, speech fluent, follows commands appropriately  Psych:  Alert, oriented x 3.  Good insight     Medications Reviewed: (see below)    Lab Data Reviewed: (see below)    ______________________________________________________________________    Medications:     Current Facility-Administered Medications   Medication Dose Route Frequency    influenza vaccine  (36mos+)(PF) (FLUZONE/FLUARIX/FLULAVAL QUAD) injection 0.5 mL  0.5 mL IntraMUSCular PRIOR TO DISCHARGE    methylPREDNISolone (PF) (SOLU-MEDROL) injection 40 mg  40 mg IntraVENous Q8H    oxyCODONE IR (ROXICODONE) tablet 10 mg  10 mg Oral Q3H PRN    polyethylene glycol (MIRALAX) packet 17 g  17 g Oral DAILY    senna-docusate (PERICOLACE) 8.6-50 mg per tablet 2 Tab  2 Tab Oral DAILY    sodium chloride (NS) flush 5-10 mL  5-10 mL IntraVENous Q8H    sodium chloride (NS) flush 5-10 mL  5-10 mL IntraVENous PRN    dextrose 5% and 0.9% NaCl infusion  50 mL/hr IntraVENous CONTINUOUS    zolpidem (AMBIEN) tablet 5 mg  5 mg Oral QHS PRN    naloxone (NARCAN) injection 0.4 mg  0.4 mg IntraVENous PRN    ondansetron (ZOFRAN) injection 4 mg  4 mg IntraVENous Q4H PRN    glucose chewable tablet 16 g  4 Tab Oral PRN    dextrose (D50W) injection syrg 12.5-25 g  12.5-25 g IntraVENous PRN    glucagon (GLUCAGEN) injection 1 mg  1 mg IntraMUSCular PRN    insulin lispro (HUMALOG) injection   SubCUTAneous AC&HS    isosorbide mononitrate ER (IMDUR) tablet 120 mg  120 mg Oral DAILY    carvedilol (COREG) tablet 25 mg  25 mg Oral BID WITH MEALS    clopidogrel (PLAVIX) tablet 75 mg  75 mg Oral DAILY    pantoprazole (PROTONIX) tablet 40 mg  40 mg Oral DAILY    amLODIPine (NORVASC) tablet 10 mg  10 mg Oral DAILY    atorvastatin (LIPITOR) tablet 80 mg  80 mg Oral QPM    Warfarin pharmacy to dose   Other Rx Dosing/Monitoring    albuterol-ipratropium (DUO-NEB) 2.5 MG-0.5 MG/3 ML  3 mL Nebulization Q6H RT    benzonatate (TESSALON) capsule 100 mg  100 mg Oral TID PRN    fluticasone (FLONASE) 50 mcg/actuation nasal spray 2 Spray  2 Spray Both Nostrils QHS    temazepam (RESTORIL) capsule 30 mg  30 mg Oral QHS PRN    sucralfate (CARAFATE) tablet 1 g  1 g Oral AC&HS    heparin 25,000 units in D5W 250 ml infusion  9.8-36 Units/kg/hr IntraVENous TITRATE            Lab Review:     Recent Labs      02/04/17   0422  02/03/17   0330  02/02/17   1520   WBC  15.6*  12.3*  13.9*   HGB  7.9*  7.9*  8.5*   HCT  23.8*  24.2*  25.9*   PLT  322  314  346     Recent Labs      02/04/17   0422  02/03/17   2259  02/03/17   0330  02/02/17   1520   NA  136   --   140 145   K  3.8   --   3.2*  3.4*   CL  101   --   104  107   CO2  20*   --   24  25   GLU  384*   --   187*  84   BUN  75*   --   60*  57*   CREA  3.78*   --   3.31*  3.25*   CA  7.6*   --   8.0*  8.0*   MG  1.8   --   1.2*  1.2*   PHOS  4.6   --    --    --    ALB  2.7*   --    --   2.7*   SGOT   --    --    --   11*   ALT   --    --    --   17   INR   --   1.1  1.1  1.1     No components found for: GLPOC  No results for input(s): PH, PCO2, PO2, HCO3, FIO2 in the last 72 hours. Recent Labs      02/03/17   2259  02/03/17   0330  02/02/17   1520   INR  1.1  1.1  1.1     Lab Results   Component Value Date/Time    Specimen Description: BLOOD 10/14/2013 03:37 AM    Specimen Description: BLOOD 10/11/2013 12:03 AM    Specimen Description: NARES 07/12/2013 04:50 AM     Lab Results   Component Value Date/Time    Culture result: HEAVY  NORMAL RESPIRATORY WAGNER   02/03/2017 04:10 PM    Culture result: NO GROWTH 2 DAYS 02/02/2017 03:20 PM    Culture result: NO GROWTH 2 DAYS 11/11/2014 03:30 PM            Assessment / Plan:     62 yo AAF w/ hx of VATS biopsy proven smoking related ILD, CHF, CAD, CKD4 p/w dyspnea, found to have GOO on chest CT and distal LE DVT    Acute on chronic hypoxemic respiratory failure /  Interstitial lung disease: unclear precipitant. Underwent bronch on 2/3, found to have mild mucoid secretions noted bilaterally consistent with mild bilateral pneumonia  -- holding abx (has been on FQ, clarithromycin, augmentin, and doxycycline) over last few weeks  -- follow up on BAL results/micro/PNA workup  -- Continue IV steroids, duo-nebs     Acute DVT (deep venous thrombosis): distal, hioweerin setting of worsening SOB, may also have PE. Allergic to contrast + severe CKD. Has ILD so V/Q not likely to be helpful  -- cont heparin gtt bridge w/ warfarin.  Follow INR, NOT drawn this morning?     Chronic diastolic heart failure / Pulmonary HTN / CAD (coronary Artery Disease) Native Artery: in setting of worsening SOB   -- TTE ordered, pending  -- Continue statin, bumex, coreg, imdur, ARB  -- will discuss with cardiology re: need to continue Plavix, as I see no absolute indication, in light of now tx on anticoagulation     HTN (hypertension):   -- cont Norvasc, Bumex, Coreg, ISMN     Morbid obesity:   -- Counseled on weight loss     CKD (chronic kidney disease) stage 4: nephrology following. worsening  -- Monitor BMP; holding ARB  -- holding diuretics now  -- Renally dose meds     Anemia: likely ACD from CKD  -- follow Hg    DM (diabetes mellitus), type 2 with renal complications: worsened with steroids. Labile BG.  Controlled, A1c 6.6%  -- SSI       Total time spent with patient: 25 minutes                  Care Plan discussed with: Patient, Nursing Staff and >50% of time spent in counseling and coordination of care    Discussed:  Care Plan    Prophylaxis:  Coumadin / heparin    Disposition:  Home w/Family           ___________________________________________________    Attending Physician: Ghada Guevara MD

## 2017-02-04 NOTE — PROGRESS NOTES
0108: Stopped Heparin per critical PTT result of 104. 1. Per order, will hold for one hour and then will resume at a lower rate. 2211:Spoke to Dr. Flaco Novoa regarding patient's blood glucose level of 365. He ordered 8 units of Humalog.

## 2017-02-04 NOTE — PROGRESS NOTES
[facility] Dosing Services  Consult for Warfarin Dosing by Pharmacy by Dr. Yolanda Zelaya  Indication: Acute DVT  Day of Therapy: 3  Dose to achieve an INR goal of 2-3     Order entered for warfarin 5 mg PO today at 18:00     Significant drug interactions: clopidogrel, heparin bridge  LABS    PT/INR Lab Results   Component Value Date/Time    INR 1.1 02/03/2017 10:59 PM      Platelets Lab Results   Component Value Date/Time    PLATELET 843 09/15/1861 04:22 AM      H/H     Lab Results   Component Value Date/Time    HGB 7.9 02/04/2017 04:22 AM        Warfarin Administrations (last 168 hours)       Date/Time Action Medication Dose      02/03/17 1819 Given    warfarin (COUMADIN) tablet 5 mg 5 mg    02/02/17 1858 Given    warfarin (COUMADIN) tablet 5 mg 5 mg        Pharmacy to follow daily and will provide subsequent Warfarin dosing based on clinical status. Thank you for the consult,  Pollo Sam

## 2017-02-04 NOTE — PROGRESS NOTES
Patient non-complaint with diet and DM regimen. Patient observed eating peanut butter crackers and drinking regular flavored soda, dinner tray, etc. BS above 400. MD notified, new orders given for one time dose of bolus insulin. No c/o pain or distress noted, all safety measures in place, will continue to monitor.

## 2017-02-05 LAB
ALBUMIN SERPL BCP-MCNC: 2.6 G/DL (ref 3.5–5)
ANION GAP BLD CALC-SCNC: 12 MMOL/L (ref 5–15)
APTT PPP: 52.7 SEC (ref 22.1–32.5)
APTT PPP: 65.1 SEC (ref 22.1–32.5)
APTT PPP: 89.6 SEC (ref 22.1–32.5)
BACTERIA SPEC CULT: NORMAL
BASOPHILS # BLD AUTO: 0 K/UL (ref 0–0.1)
BASOPHILS # BLD: 0 % (ref 0–1)
BUN SERPL-MCNC: 87 MG/DL (ref 6–20)
BUN/CREAT SERPL: 23 (ref 12–20)
CALCIUM SERPL-MCNC: 7.6 MG/DL (ref 8.5–10.1)
CHLORIDE SERPL-SCNC: 100 MMOL/L (ref 97–108)
CO2 SERPL-SCNC: 21 MMOL/L (ref 21–32)
CREAT SERPL-MCNC: 3.76 MG/DL (ref 0.55–1.02)
EOSINOPHIL # BLD: 0 K/UL (ref 0–0.4)
EOSINOPHIL NFR BLD: 0 % (ref 0–7)
ERYTHROCYTE [DISTWIDTH] IN BLOOD BY AUTOMATED COUNT: 14.7 % (ref 11.5–14.5)
GLUCOSE BLD STRIP.AUTO-MCNC: 378 MG/DL (ref 65–100)
GLUCOSE BLD STRIP.AUTO-MCNC: 439 MG/DL (ref 65–100)
GLUCOSE BLD STRIP.AUTO-MCNC: 485 MG/DL (ref 65–100)
GLUCOSE BLD STRIP.AUTO-MCNC: 531 MG/DL (ref 65–100)
GLUCOSE BLD STRIP.AUTO-MCNC: 539 MG/DL (ref 65–100)
GLUCOSE SERPL-MCNC: 433 MG/DL (ref 65–100)
GRAM STN SPEC: NORMAL
HCT VFR BLD AUTO: 22.3 % (ref 35–47)
HGB BLD-MCNC: 7.5 G/DL (ref 11.5–16)
INR PPP: 1.5 (ref 0.9–1.1)
LYMPHOCYTES # BLD AUTO: 7 % (ref 12–49)
LYMPHOCYTES # BLD: 1.3 K/UL (ref 0.8–3.5)
MAGNESIUM SERPL-MCNC: 1.8 MG/DL (ref 1.6–2.4)
MCH RBC QN AUTO: 30.4 PG (ref 26–34)
MCHC RBC AUTO-ENTMCNC: 33.6 G/DL (ref 30–36.5)
MCV RBC AUTO: 90.3 FL (ref 80–99)
MONOCYTES # BLD: 0.7 K/UL (ref 0–1)
MONOCYTES NFR BLD AUTO: 4 % (ref 5–13)
NEUTS SEG # BLD: 17.6 K/UL (ref 1.8–8)
NEUTS SEG NFR BLD AUTO: 89 % (ref 32–75)
PHOSPHATE SERPL-MCNC: 4.4 MG/DL (ref 2.6–4.7)
PLATELET # BLD AUTO: 311 K/UL (ref 150–400)
POTASSIUM SERPL-SCNC: ABNORMAL MMOL/L (ref 3.5–5.1)
PROTHROMBIN TIME: 15.1 SEC (ref 9–11.1)
RBC # BLD AUTO: 2.47 M/UL (ref 3.8–5.2)
SERVICE CMNT-IMP: ABNORMAL
SERVICE CMNT-IMP: NORMAL
SODIUM SERPL-SCNC: 133 MMOL/L (ref 136–145)
THERAPEUTIC RANGE,PTTT: ABNORMAL SECS (ref 58–77)
WBC # BLD AUTO: 19.6 K/UL (ref 3.6–11)

## 2017-02-05 PROCEDURE — 74011250637 HC RX REV CODE- 250/637: Performed by: INTERNAL MEDICINE

## 2017-02-05 PROCEDURE — 74011636637 HC RX REV CODE- 636/637: Performed by: INTERNAL MEDICINE

## 2017-02-05 PROCEDURE — 77010033678 HC OXYGEN DAILY

## 2017-02-05 PROCEDURE — 85025 COMPLETE CBC W/AUTO DIFF WBC: CPT | Performed by: INTERNAL MEDICINE

## 2017-02-05 PROCEDURE — 80069 RENAL FUNCTION PANEL: CPT | Performed by: INTERNAL MEDICINE

## 2017-02-05 PROCEDURE — 94640 AIRWAY INHALATION TREATMENT: CPT

## 2017-02-05 PROCEDURE — 83735 ASSAY OF MAGNESIUM: CPT | Performed by: INTERNAL MEDICINE

## 2017-02-05 PROCEDURE — 85730 THROMBOPLASTIN TIME PARTIAL: CPT | Performed by: INTERNAL MEDICINE

## 2017-02-05 PROCEDURE — 93005 ELECTROCARDIOGRAM TRACING: CPT

## 2017-02-05 PROCEDURE — 74011250636 HC RX REV CODE- 250/636: Performed by: PHYSICIAN ASSISTANT

## 2017-02-05 PROCEDURE — 74011000250 HC RX REV CODE- 250: Performed by: INTERNAL MEDICINE

## 2017-02-05 PROCEDURE — 65270000029 HC RM PRIVATE

## 2017-02-05 PROCEDURE — 74011250636 HC RX REV CODE- 250/636: Performed by: INTERNAL MEDICINE

## 2017-02-05 PROCEDURE — 36415 COLL VENOUS BLD VENIPUNCTURE: CPT | Performed by: INTERNAL MEDICINE

## 2017-02-05 PROCEDURE — 74011250637 HC RX REV CODE- 250/637: Performed by: NURSE PRACTITIONER

## 2017-02-05 PROCEDURE — 94660 CPAP INITIATION&MGMT: CPT

## 2017-02-05 PROCEDURE — 82962 GLUCOSE BLOOD TEST: CPT

## 2017-02-05 PROCEDURE — 85610 PROTHROMBIN TIME: CPT | Performed by: INTERNAL MEDICINE

## 2017-02-05 RX ORDER — NITROGLYCERIN 0.4 MG/1
0.4 TABLET SUBLINGUAL
Status: DISCONTINUED | OUTPATIENT
Start: 2017-02-05 | End: 2017-02-10 | Stop reason: HOSPADM

## 2017-02-05 RX ORDER — WARFARIN SODIUM 5 MG/1
5 TABLET ORAL ONCE
Status: COMPLETED | OUTPATIENT
Start: 2017-02-05 | End: 2017-02-05

## 2017-02-05 RX ADMIN — FLUTICASONE PROPIONATE 2 SPRAY: 50 SPRAY, METERED NASAL at 22:03

## 2017-02-05 RX ADMIN — CARVEDILOL 25 MG: 12.5 TABLET, FILM COATED ORAL at 18:00

## 2017-02-05 RX ADMIN — INSULIN LISPRO 10 UNITS: 100 INJECTION, SOLUTION INTRAVENOUS; SUBCUTANEOUS at 11:40

## 2017-02-05 RX ADMIN — ONDANSETRON 4 MG: 2 INJECTION INTRAMUSCULAR; INTRAVENOUS at 14:48

## 2017-02-05 RX ADMIN — HEPARIN SODIUM AND DEXTROSE 6.81 UNITS/KG/HR: 10000; 5 INJECTION INTRAVENOUS at 13:58

## 2017-02-05 RX ADMIN — SUCRALFATE 1 G: 1 TABLET ORAL at 08:58

## 2017-02-05 RX ADMIN — Medication 10 ML: at 22:03

## 2017-02-05 RX ADMIN — CARVEDILOL 25 MG: 12.5 TABLET, FILM COATED ORAL at 08:58

## 2017-02-05 RX ADMIN — INSULIN LISPRO 7 UNITS: 100 INJECTION, SOLUTION INTRAVENOUS; SUBCUTANEOUS at 22:52

## 2017-02-05 RX ADMIN — ISOSORBIDE MONONITRATE 120 MG: 30 TABLET ORAL at 08:59

## 2017-02-05 RX ADMIN — INSULIN LISPRO 10 UNITS: 100 INJECTION, SOLUTION INTRAVENOUS; SUBCUTANEOUS at 08:57

## 2017-02-05 RX ADMIN — METHYLPREDNISOLONE SODIUM SUCCINATE 40 MG: 40 INJECTION, POWDER, FOR SOLUTION INTRAMUSCULAR; INTRAVENOUS at 13:57

## 2017-02-05 RX ADMIN — HUMAN INSULIN 8 UNITS: 100 INJECTION, SUSPENSION SUBCUTANEOUS at 08:57

## 2017-02-05 RX ADMIN — AMLODIPINE BESYLATE 10 MG: 5 TABLET ORAL at 08:59

## 2017-02-05 RX ADMIN — NITROGLYCERIN 0.4 MG: 0.4 TABLET SUBLINGUAL at 23:51

## 2017-02-05 RX ADMIN — POLYETHYLENE GLYCOL 3350 17 G: 17 POWDER, FOR SOLUTION ORAL at 08:57

## 2017-02-05 RX ADMIN — INSULIN LISPRO 10 UNITS: 100 INJECTION, SOLUTION INTRAVENOUS; SUBCUTANEOUS at 18:01

## 2017-02-05 RX ADMIN — IPRATROPIUM BROMIDE AND ALBUTEROL SULFATE 3 ML: .5; 2.5 SOLUTION RESPIRATORY (INHALATION) at 19:58

## 2017-02-05 RX ADMIN — Medication 10 ML: at 06:58

## 2017-02-05 RX ADMIN — SUCRALFATE 1 G: 1 TABLET ORAL at 22:00

## 2017-02-05 RX ADMIN — METHYLPREDNISOLONE SODIUM SUCCINATE 40 MG: 40 INJECTION, POWDER, FOR SOLUTION INTRAMUSCULAR; INTRAVENOUS at 06:57

## 2017-02-05 RX ADMIN — WARFARIN SODIUM 5 MG: 5 TABLET ORAL at 18:00

## 2017-02-05 RX ADMIN — METHYLPREDNISOLONE SODIUM SUCCINATE 40 MG: 40 INJECTION, POWDER, FOR SOLUTION INTRAMUSCULAR; INTRAVENOUS at 22:00

## 2017-02-05 RX ADMIN — ATORVASTATIN CALCIUM 80 MG: 20 TABLET, FILM COATED ORAL at 18:00

## 2017-02-05 RX ADMIN — HUMAN INSULIN 8 UNITS: 100 INJECTION, SUSPENSION SUBCUTANEOUS at 18:00

## 2017-02-05 RX ADMIN — IPRATROPIUM BROMIDE AND ALBUTEROL SULFATE 3 ML: .5; 2.5 SOLUTION RESPIRATORY (INHALATION) at 15:48

## 2017-02-05 RX ADMIN — SUCRALFATE 1 G: 1 TABLET ORAL at 11:41

## 2017-02-05 RX ADMIN — OXYCODONE HYDROCHLORIDE 10 MG: 5 TABLET ORAL at 22:52

## 2017-02-05 RX ADMIN — CLOPIDOGREL 75 MG: 75 TABLET, FILM COATED ORAL at 08:59

## 2017-02-05 RX ADMIN — IPRATROPIUM BROMIDE AND ALBUTEROL SULFATE 3 ML: .5; 2.5 SOLUTION RESPIRATORY (INHALATION) at 08:35

## 2017-02-05 RX ADMIN — PANTOPRAZOLE SODIUM 40 MG: 40 TABLET, DELAYED RELEASE ORAL at 08:58

## 2017-02-05 RX ADMIN — SUCRALFATE 1 G: 1 TABLET ORAL at 18:00

## 2017-02-05 RX ADMIN — Medication 2 TABLET: at 08:58

## 2017-02-05 RX ADMIN — OXYCODONE HYDROCHLORIDE 10 MG: 5 TABLET ORAL at 13:59

## 2017-02-05 RX ADMIN — ONDANSETRON 4 MG: 2 INJECTION INTRAMUSCULAR; INTRAVENOUS at 22:52

## 2017-02-05 RX ADMIN — NITROGLYCERIN 0.4 MG: 0.4 TABLET SUBLINGUAL at 23:56

## 2017-02-05 RX ADMIN — Medication 10 ML: at 13:59

## 2017-02-05 RX ADMIN — BENZONATATE 100 MG: 100 CAPSULE ORAL at 20:22

## 2017-02-05 NOTE — PROGRESS NOTES
Patient non-complaint with diet, continues to drink non sugar free beverages and consume items outside of a recommended DM diet, Patient states, \"When I am on steroids at home, I go threw a whole pin (KEPEN) of insulin a day, about 100 units (bolus/self administered). \" Patient was immediately educated on diet, kidney function , etc. MD notified. S/S of pain or distress noted, all safety measures in place, will continue to monitor.

## 2017-02-05 NOTE — PROGRESS NOTES
[facility] Dosing Services  Consult for Warfarin Dosing by Pharmacy by Dr. Sonia Cope  Indication: Acute DVT  Day of Therapy: 4  Dose to achieve an INR goal of 2-3     Order entered for warfarin 5 mg PO today at 18:00     Significant drug interactions: clopidogrel, heparin bridge  LABS    PT/INR Lab Results   Component Value Date/Time    INR 1.5 02/05/2017 04:07 AM      Platelets Lab Results   Component Value Date/Time    PLATELET 149 46/57/6999 04:07 AM      H/H     Lab Results   Component Value Date/Time    HGB 7.5 02/05/2017 04:07 AM        Warfarin Administrations (last 168 hours)       Date/Time Action Medication Dose      02/04/17 1701 Given    warfarin (COUMADIN) tablet 5 mg 5 mg    02/03/17 1819 Given    warfarin (COUMADIN) tablet 5 mg 5 mg    02/02/17 1858 Given    warfarin (COUMADIN) tablet 5 mg 5 mg          Pharmacy to follow daily and will provide subsequent Warfarin dosing based on clinical status. Thank you for the consult,  Pollo Chase

## 2017-02-05 NOTE — PROGRESS NOTES
Ezio Craig [facility] 79  [address] [facility], [address] [place] [address]  [phone]      Medical Progress Note      NAME: Luis Daniel Landry   :  1955  MRM:  [id number]    Date/Time: 2017          Subjective:     Chief Complaint:  F/u resp failure    Chart/notes/labs/studies reviewed, patient examined at bedside. Dyspnea on exertion with slight improvement. No f/c. Less coughing. Objective:       Vitals:          Last 24hrs VS reviewed since prior progress note. Most recent are:    Visit Vitals    /85    Pulse 72    Temp 98.3 °F (36.8 °C)    Resp 18    Ht 5' 10\" (1.778 m)    Wt 152 kg (335 lb 1.6 oz)    SpO2 97%    Breastfeeding No    BMI 48.08 kg/m2     SpO2 Readings from Last 6 Encounters:   17 97%   17 98%   10/14/16 98%   16 94%   16 97%   16 100%    O2 Flow Rate (L/min): 3 l/min   No intake or output data in the 24 hours ending 17 1806       Exam:     Physical Exam:    Gen:  Obese, pleasant, NAD  HEENT:  Sclerae nonicteric, hearing intact to voice, mucous membranes moist  Neck:  Supple, without masses. Resp:  No accessory muscle use but increased WOB, coarse, and mild wheezing, bilateral, mild. Moving good air  Card: RRR, without m/r/g. BLE edema, R>L, 1+  Abd:  +bowel sounds, soft, NTTP, nondistended. Neuro: Face symmetric, tongue midline, speech fluent, follows commands appropriately  Psych:  Alert, oriented x 3.  Good insight     Medications Reviewed: (see below)    Lab Data Reviewed: (see below)    ______________________________________________________________________    Medications:     Current Facility-Administered Medications   Medication Dose Route Frequency    insulin NPH (NOVOLIN N, HUMULIN N) injection 8 Units  8 Units SubCUTAneous ACB&D    influenza vaccine  (36mos+)(PF) (FLUZONE/FLUARIX/FLULAVAL QUAD) injection 0.5 mL  0.5 mL IntraMUSCular PRIOR TO DISCHARGE    methylPREDNISolone (PF) (SOLU-MEDROL) injection 40 mg  40 mg IntraVENous Q8H    oxyCODONE IR (ROXICODONE) tablet 10 mg  10 mg Oral Q3H PRN    polyethylene glycol (MIRALAX) packet 17 g  17 g Oral DAILY    senna-docusate (PERICOLACE) 8.6-50 mg per tablet 2 Tab  2 Tab Oral DAILY    sodium chloride (NS) flush 5-10 mL  5-10 mL IntraVENous Q8H    sodium chloride (NS) flush 5-10 mL  5-10 mL IntraVENous PRN    zolpidem (AMBIEN) tablet 5 mg  5 mg Oral QHS PRN    naloxone (NARCAN) injection 0.4 mg  0.4 mg IntraVENous PRN    ondansetron (ZOFRAN) injection 4 mg  4 mg IntraVENous Q4H PRN    glucose chewable tablet 16 g  4 Tab Oral PRN    dextrose (D50W) injection syrg 12.5-25 g  12.5-25 g IntraVENous PRN    glucagon (GLUCAGEN) injection 1 mg  1 mg IntraMUSCular PRN    insulin lispro (HUMALOG) injection   SubCUTAneous AC&HS    isosorbide mononitrate ER (IMDUR) tablet 120 mg  120 mg Oral DAILY    carvedilol (COREG) tablet 25 mg  25 mg Oral BID WITH MEALS    clopidogrel (PLAVIX) tablet 75 mg  75 mg Oral DAILY    pantoprazole (PROTONIX) tablet 40 mg  40 mg Oral DAILY    amLODIPine (NORVASC) tablet 10 mg  10 mg Oral DAILY    atorvastatin (LIPITOR) tablet 80 mg  80 mg Oral QPM    Warfarin pharmacy to dose   Other Rx Dosing/Monitoring    albuterol-ipratropium (DUO-NEB) 2.5 MG-0.5 MG/3 ML  3 mL Nebulization Q6H RT    benzonatate (TESSALON) capsule 100 mg  100 mg Oral TID PRN    fluticasone (FLONASE) 50 mcg/actuation nasal spray 2 Spray  2 Spray Both Nostrils QHS    temazepam (RESTORIL) capsule 30 mg  30 mg Oral QHS PRN    sucralfate (CARAFATE) tablet 1 g  1 g Oral AC&HS    heparin 25,000 units in D5W 250 ml infusion  9.8-36 Units/kg/hr IntraVENous TITRATE            Lab Review:     Recent Labs      02/05/17   0407  02/04/17   0422  02/03/17   0330   WBC  19.6*  15.6*  12.3*   HGB  7.5*  7.9*  7.9*   HCT  22.3*  23.8*  24.2*   PLT  311  322  314     Recent Labs      02/05/17   0407  02/04/17   0422  02/03/17   2259  02/03/17   0330   NA 133*  136   --   140   K  HEMOLYZED,RECOLLECT REQUESTED  3.8   --   3.2*   CL  100  101   --   104   CO2  21  20*   --   24   GLU  433*  384*   --   187*   BUN  87*  75*   --   60*   CREA  3.76*  3.78*   --   3.31*   CA  7.6*  7.6*   --   8.0*   MG  1.8  1.8   --   1.2*   PHOS  4.4  4.6   --    --    ALB  2.6*  2.7*   --    --    INR  1.5*   --   1.1  1.1     No components found for: GLPOC  No results for input(s): PH, PCO2, PO2, HCO3, FIO2 in the last 72 hours. Recent Labs      02/05/17   0407  02/03/17   2259  02/03/17   0330   INR  1.5*  1.1  1.1     Lab Results   Component Value Date/Time    Specimen Description: BLOOD 10/14/2013 03:37 AM    Specimen Description: BLOOD 10/11/2013 12:03 AM    Specimen Description: NARES 07/12/2013 04:50 AM     Lab Results   Component Value Date/Time    Culture result: HEAVY  NORMAL RESPIRATORY WAGNER   02/03/2017 04:10 PM    Culture result: NO GROWTH 3 DAYS 02/02/2017 03:20 PM    Culture result: NO GROWTH 2 DAYS 11/11/2014 03:30 PM            Assessment / Plan:     64 yo AAF w/ hx of VATS biopsy proven smoking related ILD, CHF, CAD, CKD4 p/w dyspnea, found to have GOO on chest CT and distal LE DVT    Acute on chronic hypoxemic respiratory failure /  Interstitial lung disease: unclear precipitant. Underwent bronch on 2/3, found to have mild mucoid secretions noted bilaterally consistent with mild bilateral pneumonia  -- holding abx (had been on FQ, clarithromycin, augmentin, and doxycycline) over last few weeks  -- follow up on BAL results/micro/PNA workup, NGTD  -- Continue IV steroids (keep at same dose today), duo-nebs  -- leukocytosis likely from steroids, monitor off abx     Acute DVT (deep venous thrombosis): distal, hioweerin setting of worsening SOB, may also have PE. Allergic to contrast + severe CKD. Has ILD so V/Q not likely to be helpful  -- cont heparin gtt bridge w/ warfarin.  Follow INR, 1.5 today     Chronic diastolic heart failure / Pulmonary HTN / CAD (coronary Artery Disease) Native Artery: in setting of worsening SOB   -- TTE ordered, pending  -- Continue statin, bumex, coreg, imdur, ARB  -- will discuss with cardiology re: need to continue Plavix, as I see no absolute indication, in light of now tx on anticoagulation     HTN (hypertension):   -- cont Norvasc, Bumex, Coreg, ISMN     Morbid obesity:   -- Counseled on weight loss     ROSEANN on CKD (chronic kidney disease) stage 4: nephrology following. Cr stable today  -- Monitor BMP; holding ARB and diuretic  -- Renally dose meds     Anemia: likely ACD from CKD  -- follow Hg    DM (diabetes mellitus), type 2 with renal complications: poorly controlled on steroids. Worsened with steroids. Labile BG, A1c 6.6%. Usually on sliding scale only  -- BG markedly elevated today, add NPH 8u BID, titrate up PRN  -- SSI, high dose  -- pt noncompliant with diabetic diet, drinking sodas.  Education provided  -- Diabetes mgmt team consult       Total time spent with patient: 25 minutes                  Care Plan discussed with: Patient, Nursing Staff and >50% of time spent in counseling and coordination of care    Discussed:  Care Plan    Prophylaxis:  Coumadin / heparin    Disposition:  Home w/Family           ___________________________________________________    Attending Physician: Suhail Garber MD

## 2017-02-05 NOTE — ROUTINE PROCESS
Bedside and Verbal shift change report given to Nicole Ibrahim RN (oncoming nurse) by Goldie Escalona RN (offgoing nurse).  Report included the following information SBAR, Kardex, MAR, Accordion and Recent Results

## 2017-02-06 LAB
ALBUMIN SERPL BCP-MCNC: 2.6 G/DL (ref 3.5–5)
ANION GAP BLD CALC-SCNC: 13 MMOL/L (ref 5–15)
APTT PPP: 57.4 SEC (ref 22.1–32.5)
ATRIAL RATE: 66 BPM
BASOPHILS # BLD AUTO: 0 K/UL (ref 0–0.1)
BASOPHILS # BLD: 0 % (ref 0–1)
BUN SERPL-MCNC: 91 MG/DL (ref 6–20)
BUN/CREAT SERPL: 23 (ref 12–20)
CALCIUM SERPL-MCNC: 7.4 MG/DL (ref 8.5–10.1)
CALCULATED P AXIS, ECG09: 47 DEGREES
CALCULATED R AXIS, ECG10: 15 DEGREES
CALCULATED T AXIS, ECG11: 55 DEGREES
CHLORIDE SERPL-SCNC: 101 MMOL/L (ref 97–108)
CO2 SERPL-SCNC: 21 MMOL/L (ref 21–32)
CREAT SERPL-MCNC: 3.92 MG/DL (ref 0.55–1.02)
DIAGNOSIS, 93000: NORMAL
EOSINOPHIL # BLD: 0 K/UL (ref 0–0.4)
EOSINOPHIL NFR BLD: 0 % (ref 0–7)
ERYTHROCYTE [DISTWIDTH] IN BLOOD BY AUTOMATED COUNT: 14.4 % (ref 11.5–14.5)
GLUCOSE BLD STRIP.AUTO-MCNC: 324 MG/DL (ref 65–100)
GLUCOSE BLD STRIP.AUTO-MCNC: 346 MG/DL (ref 65–100)
GLUCOSE BLD STRIP.AUTO-MCNC: 366 MG/DL (ref 65–100)
GLUCOSE BLD STRIP.AUTO-MCNC: 430 MG/DL (ref 65–100)
GLUCOSE SERPL-MCNC: 340 MG/DL (ref 65–100)
HCT VFR BLD AUTO: 23.1 % (ref 35–47)
HGB BLD-MCNC: 7.8 G/DL (ref 11.5–16)
INR PPP: 2 (ref 0.9–1.1)
LYMPHOCYTES # BLD AUTO: 7 % (ref 12–49)
LYMPHOCYTES # BLD: 1.5 K/UL (ref 0.8–3.5)
MAGNESIUM SERPL-MCNC: 1.5 MG/DL (ref 1.6–2.4)
MCH RBC QN AUTO: 30.2 PG (ref 26–34)
MCHC RBC AUTO-ENTMCNC: 33.8 G/DL (ref 30–36.5)
MCV RBC AUTO: 89.5 FL (ref 80–99)
MONOCYTES # BLD: 0.9 K/UL (ref 0–1)
MONOCYTES NFR BLD AUTO: 4 % (ref 5–13)
NEUTS SEG # BLD: 18.7 K/UL (ref 1.8–8)
NEUTS SEG NFR BLD AUTO: 89 % (ref 32–75)
P-R INTERVAL, ECG05: 166 MS
PHOSPHATE SERPL-MCNC: 4 MG/DL (ref 2.6–4.7)
PLATELET # BLD AUTO: 316 K/UL (ref 150–400)
POTASSIUM SERPL-SCNC: 3.8 MMOL/L (ref 3.5–5.1)
PROTHROMBIN TIME: 20.2 SEC (ref 9–11.1)
Q-T INTERVAL, ECG07: 420 MS
QRS DURATION, ECG06: 90 MS
QTC CALCULATION (BEZET), ECG08: 440 MS
RBC # BLD AUTO: 2.58 M/UL (ref 3.8–5.2)
SERVICE CMNT-IMP: ABNORMAL
SODIUM SERPL-SCNC: 135 MMOL/L (ref 136–145)
THERAPEUTIC RANGE,PTTT: ABNORMAL SECS (ref 58–77)
VENTRICULAR RATE, ECG03: 66 BPM
WBC # BLD AUTO: 21.1 K/UL (ref 3.6–11)

## 2017-02-06 PROCEDURE — 65270000029 HC RM PRIVATE

## 2017-02-06 PROCEDURE — 77030027138 HC INCENT SPIROMETER -A

## 2017-02-06 PROCEDURE — 74011000250 HC RX REV CODE- 250: Performed by: INTERNAL MEDICINE

## 2017-02-06 PROCEDURE — 74011250637 HC RX REV CODE- 250/637: Performed by: NURSE PRACTITIONER

## 2017-02-06 PROCEDURE — 74011250637 HC RX REV CODE- 250/637: Performed by: INTERNAL MEDICINE

## 2017-02-06 PROCEDURE — 80069 RENAL FUNCTION PANEL: CPT | Performed by: INTERNAL MEDICINE

## 2017-02-06 PROCEDURE — 36415 COLL VENOUS BLD VENIPUNCTURE: CPT | Performed by: INTERNAL MEDICINE

## 2017-02-06 PROCEDURE — 74011636637 HC RX REV CODE- 636/637: Performed by: INTERNAL MEDICINE

## 2017-02-06 PROCEDURE — 74011250636 HC RX REV CODE- 250/636: Performed by: INTERNAL MEDICINE

## 2017-02-06 PROCEDURE — 77010033678 HC OXYGEN DAILY

## 2017-02-06 PROCEDURE — 74011250636 HC RX REV CODE- 250/636: Performed by: PHYSICIAN ASSISTANT

## 2017-02-06 PROCEDURE — 97116 GAIT TRAINING THERAPY: CPT

## 2017-02-06 PROCEDURE — 94640 AIRWAY INHALATION TREATMENT: CPT

## 2017-02-06 PROCEDURE — 83735 ASSAY OF MAGNESIUM: CPT | Performed by: INTERNAL MEDICINE

## 2017-02-06 PROCEDURE — 85025 COMPLETE CBC W/AUTO DIFF WBC: CPT | Performed by: PHYSICIAN ASSISTANT

## 2017-02-06 PROCEDURE — 97530 THERAPEUTIC ACTIVITIES: CPT

## 2017-02-06 PROCEDURE — 82962 GLUCOSE BLOOD TEST: CPT

## 2017-02-06 RX ORDER — HYDROCODONE POLISTIREX AND CHLORPHENIRAMINE POLISTIREX 10; 8 MG/5ML; MG/5ML
5 SUSPENSION, EXTENDED RELEASE ORAL
Status: DISCONTINUED | OUTPATIENT
Start: 2017-02-06 | End: 2017-02-07

## 2017-02-06 RX ORDER — FACIAL-BODY WIPES
10 EACH TOPICAL ONCE
Status: COMPLETED | OUTPATIENT
Start: 2017-02-06 | End: 2017-02-06

## 2017-02-06 RX ORDER — INSULIN LISPRO 100 [IU]/ML
7 INJECTION, SOLUTION INTRAVENOUS; SUBCUTANEOUS ONCE
Status: COMPLETED | OUTPATIENT
Start: 2017-02-06 | End: 2017-02-06

## 2017-02-06 RX ORDER — WARFARIN 2 MG/1
4 TABLET ORAL ONCE
Status: COMPLETED | OUTPATIENT
Start: 2017-02-06 | End: 2017-02-06

## 2017-02-06 RX ORDER — HEPARIN SODIUM 10000 [USP'U]/100ML
9.8-36 INJECTION, SOLUTION INTRAVENOUS
Status: DISCONTINUED | OUTPATIENT
Start: 2017-02-06 | End: 2017-02-06

## 2017-02-06 RX ADMIN — NITROGLYCERIN 0.4 MG: 0.4 TABLET SUBLINGUAL at 00:01

## 2017-02-06 RX ADMIN — BENZONATATE 100 MG: 100 CAPSULE ORAL at 08:57

## 2017-02-06 RX ADMIN — SUCRALFATE 1 G: 1 TABLET ORAL at 11:42

## 2017-02-06 RX ADMIN — PANTOPRAZOLE SODIUM 40 MG: 40 TABLET, DELAYED RELEASE ORAL at 08:37

## 2017-02-06 RX ADMIN — Medication 10 ML: at 07:08

## 2017-02-06 RX ADMIN — POLYETHYLENE GLYCOL 3350 17 G: 17 POWDER, FOR SOLUTION ORAL at 08:36

## 2017-02-06 RX ADMIN — BISACODYL 10 MG: 10 SUPPOSITORY RECTAL at 18:00

## 2017-02-06 RX ADMIN — Medication 2 TABLET: at 08:37

## 2017-02-06 RX ADMIN — METHYLPREDNISOLONE SODIUM SUCCINATE 40 MG: 40 INJECTION, POWDER, FOR SOLUTION INTRAMUSCULAR; INTRAVENOUS at 13:33

## 2017-02-06 RX ADMIN — Medication 10 ML: at 13:34

## 2017-02-06 RX ADMIN — METHYLPREDNISOLONE SODIUM SUCCINATE 40 MG: 40 INJECTION, POWDER, FOR SOLUTION INTRAMUSCULAR; INTRAVENOUS at 07:07

## 2017-02-06 RX ADMIN — SUCRALFATE 1 G: 1 TABLET ORAL at 16:24

## 2017-02-06 RX ADMIN — METHYLPREDNISOLONE SODIUM SUCCINATE 40 MG: 40 INJECTION, POWDER, FOR SOLUTION INTRAMUSCULAR; INTRAVENOUS at 21:46

## 2017-02-06 RX ADMIN — INSULIN LISPRO 12 UNITS: 100 INJECTION, SOLUTION INTRAVENOUS; SUBCUTANEOUS at 16:23

## 2017-02-06 RX ADMIN — HUMAN INSULIN 8 UNITS: 100 INJECTION, SUSPENSION SUBCUTANEOUS at 16:23

## 2017-02-06 RX ADMIN — INSULIN LISPRO 10 UNITS: 100 INJECTION, SOLUTION INTRAVENOUS; SUBCUTANEOUS at 08:36

## 2017-02-06 RX ADMIN — CLOPIDOGREL 75 MG: 75 TABLET, FILM COATED ORAL at 08:37

## 2017-02-06 RX ADMIN — Medication 10 ML: at 21:47

## 2017-02-06 RX ADMIN — SUCRALFATE 1 G: 1 TABLET ORAL at 21:46

## 2017-02-06 RX ADMIN — IPRATROPIUM BROMIDE AND ALBUTEROL SULFATE 3 ML: .5; 2.5 SOLUTION RESPIRATORY (INHALATION) at 19:52

## 2017-02-06 RX ADMIN — INSULIN LISPRO 7 UNITS: 100 INJECTION, SOLUTION INTRAVENOUS; SUBCUTANEOUS at 21:46

## 2017-02-06 RX ADMIN — CARVEDILOL 25 MG: 12.5 TABLET, FILM COATED ORAL at 08:37

## 2017-02-06 RX ADMIN — ATORVASTATIN CALCIUM 80 MG: 20 TABLET, FILM COATED ORAL at 17:57

## 2017-02-06 RX ADMIN — OXYCODONE HYDROCHLORIDE 10 MG: 5 TABLET ORAL at 15:25

## 2017-02-06 RX ADMIN — HUMAN INSULIN 8 UNITS: 100 INJECTION, SUSPENSION SUBCUTANEOUS at 08:36

## 2017-02-06 RX ADMIN — AMLODIPINE BESYLATE 10 MG: 5 TABLET ORAL at 08:37

## 2017-02-06 RX ADMIN — IPRATROPIUM BROMIDE AND ALBUTEROL SULFATE 3 ML: .5; 2.5 SOLUTION RESPIRATORY (INHALATION) at 01:10

## 2017-02-06 RX ADMIN — HEPARIN SODIUM AND DEXTROSE 7.8 UNITS/KG/HR: 10000; 5 INJECTION INTRAVENOUS at 01:04

## 2017-02-06 RX ADMIN — ISOSORBIDE MONONITRATE 120 MG: 30 TABLET ORAL at 08:37

## 2017-02-06 RX ADMIN — SUCRALFATE 1 G: 1 TABLET ORAL at 08:37

## 2017-02-06 RX ADMIN — FLUTICASONE PROPIONATE 2 SPRAY: 50 SPRAY, METERED NASAL at 21:47

## 2017-02-06 RX ADMIN — ONDANSETRON 4 MG: 2 INJECTION INTRAMUSCULAR; INTRAVENOUS at 15:29

## 2017-02-06 RX ADMIN — WARFARIN SODIUM 4 MG: 2 TABLET ORAL at 17:57

## 2017-02-06 RX ADMIN — CARVEDILOL 25 MG: 12.5 TABLET, FILM COATED ORAL at 16:24

## 2017-02-06 RX ADMIN — INSULIN LISPRO 10 UNITS: 100 INJECTION, SOLUTION INTRAVENOUS; SUBCUTANEOUS at 11:42

## 2017-02-06 NOTE — PROGRESS NOTES
2/6/2017   CARE MANAGEMENT NOTE:  CM is following pt for initial discharge planning. EMR reviewed. CM met with pt and her spouse at bedside to obtain hx for this needs assessment. Reportedly, pt resides with her  in a one story home. PTA, pt was ambulatory, indepn with ADLs, and drives. She is unemployed and on disability. Pt has RX drug coverage and she uses [facility] pharmacy on [address]  Pt does not have home healthcare currently although she states that she may benefit from skilled nursing visits (she does not have a preference of agency). DME in the home includes a cane, rolling walker, nebulizer, Cpap, BSC, shower chair, and oxygen. Pt reports that she is changing from [facility] to [address] [place] [address] as the former company does not have tanks that last over 2 hrs. PCP - none. She was given a list of area providers. Currently, she only has specialists for medical f/u. Pt would like HH and a rollator upon discharge.   Kamilah

## 2017-02-06 NOTE — PROGRESS NOTES
0530:  Pt refused lab draw and stated she did not want anymore needle sticks. She stated that she had previously spoke to one of her doctors regarding a possible central line. I called pharmacy regarding the PTT lab that is due now and the Pharmacist, Jenn Lake stated that patient's INR is 2.0 and heparin can safely be discontinued if ordered by physician. Pharmacist called Dr. Kb Barone for discontinuation of heparin order. 2340: Spoke to Dr. Josephine Feliciano regarding patient's C/O chest pain. Patient states that she takes nitroglycerin tablets prn at home for angina; with symptoms typically resolving after two tablets. Dr. Josephine Feliciano ordered nitro tab 0.4 mg. Patient's angina resolved after three 0.4 mg nitroglycerin tablets. Patient Vitals for the past 4 hrs:   Pulse Resp BP SpO2   02/05/17 2331 73 20 155/74 100 %         2231:Spoke to Dr. Josephine Feliciano regarding patient's glucose level of 378. He ordered 7 units of insulin.

## 2017-02-06 NOTE — PROGRESS NOTES
Interdisciplinary team rounds were held 2/6/2017 with the following team members:Care Management, Nursing, Physician and the Primary RN. Plan of care discussed. See clinical pathway and/or care plan for interventions and desired outcomes.     Discharge unknown at this time

## 2017-02-06 NOTE — DIABETES MGMT
DTC Consult Note     POC Glucose last 24hrs:     Lab Results   Component Value Date/Time    GLUCPOC 324 (H) 2017 07:20 AM    GLUCPOC 378 (H) 2017 09:58 PM    GLUCPOC 539 (H) 2017 11:21 AM        Recommendations/ Comments: Pt was seen by me on 2/3/2017 at that time dietary modifications were suggested. Reconsulted. Will follow up on an outpatient basis. Glucose, however, elevated. Fasting glucose per am lab: 433 mg/dL. Required 47 units of correction in the past 24 hours. If appropriate, please consider the followin. Increasing NPH from 8 units twice a day to 20 units 3 times a day. This will aid in managing the hyperglycemia associated with methylprednisolone, as the hyperglycemia associated with IV steroids coincides with the NPH peak. They can be tapered together if linked in [address] [place] [address]. *Weight-based calculations based on guidelines from  Kailey Vogt., & Doctor JUANCHO Garza (2009). Glucocorticoid-induced hyperglycemia. Endocrine Practice, 15(5), [id number]. Consult received from Erickson Temple MD   for  []    Assessment of home management  []    Meal planning  []    Meter / Monitoring  []    New Diagnosis  []    Insulin education  []    Insulin pump notification  []    Medication recommendations  []    Hospital glucose management  []    Park Vuong  [x]    Outpatient Education - Information forwarded to [address] [place] [address] who will follow-up with pt 1 week after discharge     Hospital (inpatient) medications:  1. Correction Scale: Lispro (Humalog) Insulin Resistant Sensitivity scale to cover for glucose > 139 mg/dL before meals and for glucose >199 at bedtime      Assessment / Plan:     Chart reviewed and initial evaluation complete on [address] [place] [address] . Kesha Garduno is a 62 yo AA female with a past medical history significant for  DM type 2, per Dr. Kyle Lynch H&P dated 2017.  She sees Dr. Tiffani Ziegler MD [facility]) on an outpatient basis. Per patient home medications are  1. Novolog Mix 70/30 - on a sliding scale after meals  For glucose <150 - 60 units; will add 20 units for every 50 points above 150. A1C:   Lab Results   Component Value Date/Time    Hemoglobin A1c 6.6 02/03/2017 03:30 AM    Hemoglobin A1c 5.2 11/09/2014 04:00 AM         Thank you.     Hannah Benitez, Certified Diabetes Educator    [phone]

## 2017-02-06 NOTE — ROUTINE PROCESS
Bedside and Verbal shift change report given to Rajni RN (oncoming nurse) by Meri Bailey RN (offgoing nurse). Report included the following information SBAR, Kardex, Intake/Output, MAR, Accordion and Recent Results.

## 2017-02-06 NOTE — PROGRESS NOTES
Problem: Patient Education: Go to Patient Education Activity  Goal: Patient/Family Education  PHYSICAL THERAPY TREATMENT  Patient: Chica Thompson (89 y.o. female)  Date: 2/6/2017  Diagnosis: DVT (deep venous thrombosis) ([facility]  airway survey <principal problem not specified>  Procedure(s) (LRB):  BRONCHOSCOPY (N/A)  BRONCHIAL ALVEOLAR LAVAGE (Right) 3 Days Post-Op  Precautions:    Chart, physical therapy assessment, plan of care and goals were reviewed. ASSESSMENT:  Pt comes to sit with supervision. Pt to stand with CGA. Pt ambulated 28ft with no device CGA on 3L of O2. .Pt ambulates with increased lateral sway and slightly unsteady gait. Pt would benefit from a rollator as she  fatigues quickly with mobilty on 3L of O2. Pt tolerated treatment fairly well. Continue goals. Progression toward goals:  [ ]      Improving appropriately and progressing toward goals   [X]      Improving slowly and progressing toward goals  [ ]      Not making progress toward goals and plan of care will be adjusted       PLAN:  Patient continues to benefit from skilled intervention to address the above impairments. Continue treatment per established plan of care. Discharge Recommendations:  Home Health  Further Equipment Recommendations for Discharge:  rolling walker       SUBJECTIVE:          OBJECTIVE DATA SUMMARY:   Critical Behavior:  Neurologic State: Sleeping  Orientation Level: Oriented X4  Cognition: Follows commands, Appropriate for age attention/concentration  Safety/Judgement: Awareness of environment  Functional Mobility Training:  Bed Mobility:     Supine to Sit: Supervision                          Transfers:  Sit to Stand: Contact guard assistance                                Balance:  Sitting: Intact  Standing: Intact; With support  Ambulation/Gait Training:  Distance (ft): 28 Feet (ft)  Assistive Device: Walker, rolling;Gait belt           Gait Abnormalities: Decreased step clearance        Base of Support: Narrowed     Speed/Nichole: Pace decreased (<100 feet/min)  Step Length: Left shortened;Right shortened           Pain:  Pain Scale 1: Numeric (0 - 10)  Pain Intensity 1: 8  Pain Location 1: Chest;Head  Pain Orientation 1: Anterior  Pain Description 1: Aching  Pain Intervention(s) 1: Declines  Activity Tolerance:   Pt tolerated treatment fairly well. Please refer to the flowsheet for vital signs taken during this treatment.   After treatment:   [X] Patient left in no apparent distress sitting up in chair  [ ] Patient left in no apparent distress in bed  [ ] Call bell left within reach  [ ] Nursing notified  [ ] Caregiver present  [ ] Bed alarm activated      COMMUNICATION/COLLABORATION:   The patients plan of care was discussed with: Physical Therapist     Antione Urbina PTA   Time Calculation: 23 mins

## 2017-02-06 NOTE — PROGRESS NOTES
[facility] Dosing Services: 2/6/17    Consult for Warfarin Dosing by Pharmacy by Dr. Niki Gonzáles provided for this 63 y.o.  female , for indication of Acute DVT  Day of Therapy 5  Dose to achieve an INR goal of 2-3    Order entered for  Warfarin  4 (mg) ordered to be given today at 18:00. Significant drug interactions: Clopidogrel  Previous dose given 5mg   PT/INR Lab Results   Component Value Date/Time    INR 2.0 02/05/2017 11:20 PM      Platelets Lab Results   Component Value Date/Time    PLATELET 479 51/07/7573 04:07 AM      H/H Lab Results   Component Value Date/Time    HGB 7.5 02/05/2017 04:07 AM        Pharmacy to follow daily and will provide subsequent Warfarin dosing based on clinical status.     Leydi Moreland, PharmD, BCPS  Contact information

## 2017-02-06 NOTE — PROGRESS NOTES
Ezio Craig [facility]  [address] [facility], [address] [place] [address]  [phone]      Medical Progress Note      NAME: Mars Callahan   :  1955  MRM:  [id number]    Date/Time: 2017  4:14 PM         Subjective:     Chief Complaint:  I feel distended    No acute events. Pt has not had a bm in several days and feels distended. She still has a cough but feels her sob is improved. Objective:       Vitals:          Last 24hrs VS reviewed since prior progress note.  Most recent are:    Visit Vitals    /61 (BP 1 Location: Left arm, BP Patient Position: At rest;Sitting)    Pulse 66    Temp 98 °F (36.7 °C)    Resp 18    Ht 5' 10\" (1.778 m)    Wt 152 kg (335 lb 1.6 oz)    SpO2 100%    Breastfeeding No    BMI 48.08 kg/m2     SpO2 Readings from Last 6 Encounters:   17 100%   17 98%   10/14/16 98%   16 94%   16 97%   16 100%    O2 Flow Rate (L/min): 3 l/min     Intake/Output Summary (Last 24 hours) at 17 1614  Last data filed at 17 0841   Gross per 24 hour   Intake              240 ml   Output                0 ml   Net              240 ml          Exam:     Physical Exam:    Gen:  obese, in no acute distress  HEENT:  Pink conjunctivae, PERRL, hearing intact to voice, moist mucous membranes  Neck:  Supple, without masses, thyroid non-tender  Resp:  No accessory muscle use, mild rhonchi bilateral  Card:  No murmurs, normal S1, S2 without thrills, bruits or peripheral edema  Abd:  Soft, non-tender, non-distended, normoactive bowel sounds are present  Musc:  No cyanosis or clubbing  Skin:  No rashes or ulcers, skin turgor is good  Neuro:  No focal deficits, follows commands appropriately  Psych:  Good insight, oriented to person, place and time, alert      Medications Reviewed: (see below)    Lab Data Reviewed: (see below)    ______________________________________________________________________    Medications:     Current Facility-Administered Medications   Medication Dose Route Frequency    chlorpheniramine-HYDROcodone (TUSSIONEX) oral suspension 5 mL  5 mL Oral Q12H PRN    warfarin (COUMADIN) tablet 4 mg  4 mg Oral ONCE    insulin NPH (NOVOLIN N, HUMULIN N) injection 8 Units  8 Units SubCUTAneous ACB&D    nitroglycerin (NITROSTAT) tablet 0.4 mg  0.4 mg SubLINGual Q5MIN PRN    influenza vaccine 2016-17 (36mos+)(PF) (FLUZONE/FLUARIX/FLULAVAL QUAD) injection 0.5 mL  0.5 mL IntraMUSCular PRIOR TO DISCHARGE    methylPREDNISolone (PF) (SOLU-MEDROL) injection 40 mg  40 mg IntraVENous Q8H    oxyCODONE IR (ROXICODONE) tablet 10 mg  10 mg Oral Q3H PRN    polyethylene glycol (MIRALAX) packet 17 g  17 g Oral DAILY    senna-docusate (PERICOLACE) 8.6-50 mg per tablet 2 Tab  2 Tab Oral DAILY    sodium chloride (NS) flush 5-10 mL  5-10 mL IntraVENous Q8H    sodium chloride (NS) flush 5-10 mL  5-10 mL IntraVENous PRN    naloxone (NARCAN) injection 0.4 mg  0.4 mg IntraVENous PRN    ondansetron (ZOFRAN) injection 4 mg  4 mg IntraVENous Q4H PRN    glucose chewable tablet 16 g  4 Tab Oral PRN    dextrose (D50W) injection syrg 12.5-25 g  12.5-25 g IntraVENous PRN    glucagon (GLUCAGEN) injection 1 mg  1 mg IntraMUSCular PRN    insulin lispro (HUMALOG) injection   SubCUTAneous AC&HS    isosorbide mononitrate ER (IMDUR) tablet 120 mg  120 mg Oral DAILY    carvedilol (COREG) tablet 25 mg  25 mg Oral BID WITH MEALS    clopidogrel (PLAVIX) tablet 75 mg  75 mg Oral DAILY    pantoprazole (PROTONIX) tablet 40 mg  40 mg Oral DAILY    amLODIPine (NORVASC) tablet 10 mg  10 mg Oral DAILY    atorvastatin (LIPITOR) tablet 80 mg  80 mg Oral QPM    Warfarin pharmacy to dose   Other Rx Dosing/Monitoring    albuterol-ipratropium (DUO-NEB) 2.5 MG-0.5 MG/3 ML  3 mL Nebulization Q6H RT    benzonatate (TESSALON) capsule 100 mg  100 mg Oral TID PRN    fluticasone (FLONASE) 50 mcg/actuation nasal spray 2 Spray  2 Spray Both Nostrils QHS    temazepam (RESTORIL) capsule 30 mg  30 mg Oral QHS PRN    sucralfate (CARAFATE) tablet 1 g  1 g Oral AC&HS            Lab Review:     Recent Labs      02/06/17 1337  02/05/17 0407 02/04/17   0422   WBC  21.1*  19.6*  15.6*   HGB  7.8*  7.5*  7.9*   HCT  23.1*  22.3*  23.8*   PLT  316  311  322     Recent Labs      02/06/17   1337  02/05/17   2320  02/05/17   0407  02/04/17   0422  02/03/17   2259   NA  135*   --   133*  136   --    K  3.8   --   HEMOLYZED,RECOLLECT REQUESTED  3.8   --    CL  101   --   100  101   --    CO2  21   --   21  20*   --    GLU  340*   --   433*  384*   --    BUN  91*   --   87*  75*   --    CREA  3.92*   --   3.76*  3.78*   --    CA  7.4*   --   7.6*  7.6*   --    MG  1.5*   --   1.8  1.8   --    PHOS  4.0   --   4.4  4.6   --    ALB  2.6*   --   2.6*  2.7*   --    INR   --   2.0*  1.5*   --   1.1     No components found for: [place]         Assessment / Plan:     Acute on chronic hypoxemic respiratory failure / Interstitial lung disease: unclear precipitant. Underwent bronch on 2/3, found to have mild mucoid secretions noted bilaterally consistent with mild bilateral pneumonia. S/p tx with multiple rounds of abx, further abx tx on hold. Continue IV steroids and nebs.       Acute DVT (deep venous thrombosis): treated with heparin gtt and pt is now refusing any further PTT lab draws. As INR is now 2 will stop heparin gtt and continue coumadin    Chronic diastolic heart failure / Pulmonary HTN / CAD (coronary Artery Disease) Native Artery: in setting of worsening SOB. Continue statin, bumex, coreg, imdur, ARB      HTN (hypertension): cont Norvasc, Bumex, Coreg, ISMN      Morbid obesity:  Counseled on weight loss      ROSEANN on CKD (chronic kidney disease) stage 4: nephrology following. Cr stable today. holding ARB and diuretic      Anemia: likely ACD from CKD     DM (diabetes mellitus), type 2 with renal complications: poorly controlled on steroids. Worsened with steroids. Labile BG, A1c 6.6%. Increase NPH today      Total time spent with patient: 40 [address] [place] [address] discussed with: Patient, Family and Consultant/Specialist    Discussed:  Care Plan    Prophylaxis:  Coumadin    Disposition:  Home w/Family           ___________________________________________________    Attending Physician: Birdie Nguyen MD

## 2017-02-06 NOTE — PROGRESS NOTES
2/6/2017 5:28 PM Referral sent to [facility] via [address]. Sofirené's contact is [phone]. CALVIN Michel     2/6/2017 4:56 PM SW met with this pt and discussed orders for rollator. Discussed choice. This pt's has been given the choice list. Plan is for referral to be sent to [facility].    CALVIN Michel

## 2017-02-06 NOTE — PROGRESS NOTES
Name: Tamar Blackmanwith: [address]   : 1955 Admit Date: 2017   Phone: [phone]  Room: [facility]   PCP: None  MRN: [id number]   Date: 2017  Code: Full Code          Chart and notes reviewed. Data reviewed. I review the patient's current medications in the medical record at each encounter. I have evaluated and examined the patient. Overnight events  Afebrile  Sats 98% on 3L  2/5 labs  WBC 19.6  Hgb 7.5  Na 133  Creat 3.76  Mag 1.8  INR 2.0  Glucose elevated  Resp cx no growth  Legionella and strep pneumo negative; Mycoplasma pending    ROS: Not feeling much better this morning. Continue with nonproductive cough and SOB. Continues to wheeze. Feels constipated. Denies CP but does have chest wall soreness with coughing. Abd feels bloated.        Current Facility-Administered Medications   Medication Dose Route Frequency    insulin NPH (NOVOLIN N, HUMULIN N) injection 8 Units  8 Units SubCUTAneous ACB&D    nitroglycerin (NITROSTAT) tablet 0.4 mg  0.4 mg SubLINGual Q5MIN PRN    influenza vaccine - (36mos+)(PF) (FLUZONE/FLUARIX/FLULAVAL QUAD) injection 0.5 mL  0.5 mL IntraMUSCular PRIOR TO DISCHARGE    methylPREDNISolone (PF) (SOLU-MEDROL) injection 40 mg  40 mg IntraVENous Q8H    oxyCODONE IR (ROXICODONE) tablet 10 mg  10 mg Oral Q3H PRN    polyethylene glycol (MIRALAX) packet 17 g  17 g Oral DAILY    senna-docusate (PERICOLACE) 8.6-50 mg per tablet 2 Tab  2 Tab Oral DAILY    sodium chloride (NS) flush 5-10 mL  5-10 mL IntraVENous Q8H    sodium chloride (NS) flush 5-10 mL  5-10 mL IntraVENous PRN    zolpidem (AMBIEN) tablet 5 mg  5 mg Oral QHS PRN    naloxone (NARCAN) injection 0.4 mg  0.4 mg IntraVENous PRN    ondansetron (ZOFRAN) injection 4 mg  4 mg IntraVENous Q4H PRN    glucose chewable tablet 16 g  4 Tab Oral PRN    dextrose (D50W) injection syrg 12.5-25 g  12.5-25 g IntraVENous PRN    glucagon (GLUCAGEN) injection 1 mg  1 mg IntraMUSCular PRN    insulin lispro (HUMALOG) injection   SubCUTAneous AC&HS    isosorbide mononitrate ER (IMDUR) tablet 120 mg  120 mg Oral DAILY    carvedilol (COREG) tablet 25 mg  25 mg Oral BID WITH MEALS    clopidogrel (PLAVIX) tablet 75 mg  75 mg Oral DAILY    pantoprazole (PROTONIX) tablet 40 mg  40 mg Oral DAILY    amLODIPine (NORVASC) tablet 10 mg  10 mg Oral DAILY    atorvastatin (LIPITOR) tablet 80 mg  80 mg Oral QPM    Warfarin pharmacy to dose   Other Rx Dosing/Monitoring    albuterol-ipratropium (DUO-NEB) 2.5 MG-0.5 MG/3 ML  3 mL Nebulization Q6H RT    benzonatate (TESSALON) capsule 100 mg  100 mg Oral TID PRN    fluticasone (FLONASE) 50 mcg/actuation nasal spray 2 Spray  2 Spray Both Nostrils QHS    temazepam (RESTORIL) capsule 30 mg  30 mg Oral QHS PRN    sucralfate (CARAFATE) tablet 1 g  1 g Oral AC&HS         REVIEW OF SYSTEMS   Negative except as stated in the HPI. Physical Exam:   Visit Vitals    /72 (BP 1 Location: Left arm, BP Patient Position: At rest)    Pulse 64    Temp 97.9 °F (36.6 °C)    Resp 18    Ht 5' 10\" (1.778 m)    Wt 152 kg (335 lb 1.6 oz)    SpO2 98%    Breastfeeding No    BMI 48.08 kg/m2       General:  Alert, cooperative, no distress, appears stated age. Head:  Normocephalic, without obvious abnormality, atraumatic. Eyes:  Conjunctivae/corneas clear. Nose: Nares normal. Septum midline. Mucosa normal.    Throat: Lips, mucosa, and tongue normal. Class 4 airway. Neck: Thick, supple, symmetrical, trachea midline, no adenopathy. Lungs:   Mildly coarse breath sounds with mild wheezing bilaterally. Chest wall:  No tenderness or deformity. Heart:  Regular rate and rhythm, S1, S2 normal, no murmur, click, rub or gallop. Abdomen:   Obese, soft, generalized TTP. Bowel sounds normal. No masses,  No organomegaly. Extremities: Extremities normal, atraumatic, no cyanosis, +LE edema, R>L. Pulses: 2+ and symmetric all extremities.    Skin: Skin color, texture, turgor normal. No rashes or lesions   Lymph nodes: Cervical, supraclavicular nodes normal.   Neurologic: Grossly nonfocal       Lab Results   Component Value Date/Time    Sodium 133 02/05/2017 04:07 AM    Potassium HEMOLYZED,RECOLLECT REQUESTED 02/05/2017 04:07 AM    Chloride 100 02/05/2017 04:07 AM    CO2 21 02/05/2017 04:07 AM    BUN 87 02/05/2017 04:07 AM    Creatinine 3.76 02/05/2017 04:07 AM    Glucose 433 02/05/2017 04:07 AM    Calcium 7.6 02/05/2017 04:07 AM    Magnesium 1.8 02/05/2017 04:07 AM    Phosphorus 4.4 02/05/2017 04:07 AM    Lactic acid 1.2 02/02/2017 03:20 PM       Lab Results   Component Value Date/Time    WBC 19.6 02/05/2017 04:07 AM    HGB 7.5 02/05/2017 04:07 AM    PLATELET 693 59/12/8463 04:07 AM    MCV 90.3 02/05/2017 04:07 AM       Lab Results   Component Value Date/Time    INR 2.0 02/05/2017 11:20 PM    aPTT 57.4 02/05/2017 11:20 PM    AST (SGOT) 11 02/02/2017 03:20 PM    Alk.  phosphatase 79 02/02/2017 03:20 PM    Protein, total 6.9 02/02/2017 03:20 PM    Albumin 2.6 02/05/2017 04:07 AM    Globulin 4.2 02/02/2017 03:20 PM       Lab Results   Component Value Date/Time    Iron 29 11/09/2014 03:30 AM    TIBC 245 11/09/2014 03:30 AM    Iron % saturation 12 11/09/2014 03:30 AM    Ferritin 255 11/09/2014 04:00 AM       No results found for: SR, CRP, GLENNY, ANAIGG, RA, RPR, RPRT, VDRLT, VDRLS, TSH, TSHEXT, TSHEXT     No results found for: PH, PHI, PCO2, PCO2I, PO2, PO2I, HCO3, HCO3I, FIO2, FIO2I    Lab Results   Component Value Date/Time    CK 72 07/01/2015 07:42 PM    CK-MB Index CANNOT BE CALCULATED 07/01/2015 07:42 PM    Troponin-I, Qt. <0.04 02/03/2017 03:30 AM        Lab Results   Component Value Date/Time    Culture result: HEAVY  NORMAL RESPIRATORY WAGNER   02/03/2017 04:10 PM    Culture result: MODERATE  YEAST   02/03/2017 04:10 PM    Culture result: NO GROWTH 4 DAYS 02/02/2017 03:20 PM       No results found for: TOXA1, RPR, HBCM, HBSAG, HAAB, HCAB, HCAB1, HAAT, G6PD, CRYAC, HIVGT, HIVR, HIV1, HIV12, HIVPC, HIVRPI    Lab Results   Component Value Date/Time    CK 72 07/01/2015 07:42 PM    CK 42 11/09/2014 03:30 AM       Lab Results   Component Value Date/Time    Color YELLOW/STRAW 02/03/2017 05:36 AM    Appearance CLEAR 02/03/2017 05:36 AM    pH (UA) 5.0 02/03/2017 05:36 AM    Protein 100 02/03/2017 05:36 AM    Glucose NEGATIVE  02/03/2017 05:36 AM    Ketone NEGATIVE  02/03/2017 05:36 AM    Bilirubin NEGATIVE  02/03/2017 05:36 AM    Blood NEGATIVE  02/03/2017 05:36 AM    Urobilinogen 0.2 02/03/2017 05:36 AM    Nitrites NEGATIVE  02/03/2017 05:36 AM    Leukocyte Esterase NEGATIVE  02/03/2017 05:36 AM    WBC 0-4 02/03/2017 05:36 AM    RBC 0-5 02/03/2017 05:36 AM    Bacteria NEGATIVE  02/03/2017 05:36 AM       Images: no new images this morning    IMPRESSION  · Acute/chronic hypoxic respiratory failure: ? ILD exacerbation  · RLE DVT; may also have PE, but allergic to contrast and had CKD, so not able to check CTA. Agree that V/Q not likley to be helpful in light of ILD. 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 at [facility] in 2010  · JASEN  · Chest pain  · Diastolic CHF  · Pulmonary HTN  · HTN  · Acute/chronic kidney disease stage 4  · History of tobacco use; quit 11/2014    PLAN  · Continue O2 and wean as able to keep sats > 90%  · F/U broch cytology and mycoplamsa  · Continue scheduled Duonebs and Solumedrol 40 mg q 8hr  · No additional abx for now given multiple recent abx courses and no growth on bronch  · Continue Tessalon and Flonase; add on Tussionex.   · Diuresis per Renal; hloding loops and ARB   · Cardiology eval appreciated  · Coumadin: will  Need to remain on anticoagulation for at least 3-6 months  · Check am labs  · CPAP nightly  · Mobilize with PT  · Bowel regimen   · GI prophylaxis: Protonix      Mariposa Minor, [address]

## 2017-02-06 NOTE — PROGRESS NOTES
[address] [facility] [address] Sands  YOB: 1955          Assessment & Plan:   1. ROSEANN on CKD 4  · Due to IVVF ( Poor PO, 2 Diuretics) and/or Bactrim  · Cr worse despite being off ARB  · Await labs from today  · She is on Steroids so role of biopsy to diagnose AIN is mute:dw pt  2. CKD 4  · Cr 2.7-2/8 mg% at baseline, , Diabetic and Hypertensive Nephrosclerosis  · Hold ARB  3. DM  · Insulin  4. HTN  · BB,CCB,Thiazide,  · ARB,Loops on Hold  5. Resp Failure  · I think this is due to Exacerbation of ILD and ? PE  · I do not think this is volume  6. Obesity  · Wt loss will help  7. Smoker  · None now  8. Chronic Diastolic CHF  ·   · Decrease Loops       Subjective:   CC:ARF  HPI: Patient seen   ROSEANN was getting worse.   She is off ARB  SOB same  Cough worse  Edema is same  ROS:as above otherwise neg for 12 sys  Current Facility-Administered Medications   Medication Dose Route Frequency    insulin NPH (NOVOLIN N, HUMULIN N) injection 8 Units  8 Units SubCUTAneous ACB&D    nitroglycerin (NITROSTAT) tablet 0.4 mg  0.4 mg SubLINGual Q5MIN PRN    influenza vaccine 2016-17 (36mos+)(PF) (FLUZONE/FLUARIX/FLULAVAL QUAD) injection 0.5 mL  0.5 mL IntraMUSCular PRIOR TO DISCHARGE    methylPREDNISolone (PF) (SOLU-MEDROL) injection 40 mg  40 mg IntraVENous Q8H    oxyCODONE IR (ROXICODONE) tablet 10 mg  10 mg Oral Q3H PRN    polyethylene glycol (MIRALAX) packet 17 g  17 g Oral DAILY    senna-docusate (PERICOLACE) 8.6-50 mg per tablet 2 Tab  2 Tab Oral DAILY    sodium chloride (NS) flush 5-10 mL  5-10 mL IntraVENous Q8H    sodium chloride (NS) flush 5-10 mL  5-10 mL IntraVENous PRN    zolpidem (AMBIEN) tablet 5 mg  5 mg Oral QHS PRN    naloxone (NARCAN) injection 0.4 mg  0.4 mg IntraVENous PRN    ondansetron (ZOFRAN) injection 4 mg  4 mg IntraVENous Q4H PRN    glucose chewable tablet 16 g  4 Tab Oral PRN    dextrose (D50W) injection syrg 12.5-25 g  12.5-25 g IntraVENous PRN    glucagon (GLUCAGEN) injection 1 mg  1 mg IntraMUSCular PRN    insulin lispro (HUMALOG) injection   SubCUTAneous AC&HS    isosorbide mononitrate ER (IMDUR) tablet 120 mg  120 mg Oral DAILY    carvedilol (COREG) tablet 25 mg  25 mg Oral BID WITH MEALS    clopidogrel (PLAVIX) tablet 75 mg  75 mg Oral DAILY    pantoprazole (PROTONIX) tablet 40 mg  40 mg Oral DAILY    amLODIPine (NORVASC) tablet 10 mg  10 mg Oral DAILY    atorvastatin (LIPITOR) tablet 80 mg  80 mg Oral QPM    Warfarin pharmacy to dose   Other Rx Dosing/Monitoring    albuterol-ipratropium (DUO-NEB) 2.5 MG-0.5 MG/3 ML  3 mL Nebulization Q6H RT    benzonatate (TESSALON) capsule 100 mg  100 mg Oral TID PRN    fluticasone (FLONASE) 50 mcg/actuation nasal spray 2 Spray  2 Spray Both Nostrils QHS    temazepam (RESTORIL) capsule 30 mg  30 mg Oral QHS PRN    sucralfate (CARAFATE) tablet 1 g  1 g Oral AC&HS          Objective:     Vitals:  Blood pressure 145/72, pulse 64, temperature 97.9 °F (36.6 °C), resp. rate 18, height 5' 10\" (1.778 m), weight 152 kg (335 lb 1.6 oz), SpO2 98 %, not currently breastfeeding. Temp (24hrs), Av.1 °F (36.7 °C), Min:97.9 °F (36.6 °C), Max:98.3 °F (36.8 °C)      Intake and Output:   07 -  1900  In: 240 [P.O.:240]  Out: -        Physical Exam:                Patient is intubated:  no    Physical Examination:   GENERAL ASSESSMENT: NAD  HEENT:Nontraumatic   CHEST: Crackles  HEART: S1S2  ABDOMEN: Soft,NT,  :Myers: n  EXTREMITY: EDEMA 1  NEURO:Grossly non focal          ECG/rhythm:    Data Review      No results for input(s): TNIPOC in the last 72 hours. No lab exists for component: ITNL   No results for input(s): CPK, CKMB, TROIQ in the last 72 hours.   Recent Labs      17   0407  17   0422   NA  133*  136   K  HEMOLYZED,RECOLLECT REQUESTED  3.8   CL  100  101   CO2  21  20*   BUN  87*  75*   CREA  3.76*  3.78*   GLU  433*  384*   PHOS  4.4  4.6   MG 1. 8  1.8   CA  7.6*  7.6*   ALB  2.6*  2.7*   WBC  19.6*  15.6*   HGB  7.5*  7.9*   HCT  22.3*  23.8*   PLT  311  322      Recent Labs      02/05/17   2320  02/05/17   1630  02/05/17   1009  02/05/17   0407   02/03/17   2259   INR  2.0*   --    --   1.5*   --   1.1   PTP  20.2*   --    --   15.1*   --   11.4*   APTT  57.4*  65.1*  89.6*  52.7*   < >  104.1*    < > = values in this interval not displayed. Needs: urine analysis, urine sodium, protein and creatinine  Lab Results   Component Value Date/Time    Sodium urine, random 25 11/10/2014 12:40 PM    Creatinine, urine 115.07 02/04/2017 09:33 AM         Discussed with:  pt    : Evangelina Guerra MD  2/6/2017        [facility]:  [email or web address]  [place] office:  [address] [place] [address], [address] [place] [address] [address]  [place], [address] [facility]  Phone: [phone]  Fax :     [phone]    [place] office:  [address] [facility], [address]  Phone - [phone]  Fax - [phone]

## 2017-02-06 NOTE — PROGRESS NOTES
0496: Notified MD Purcell that pt is refusing peripheral sticks for PTT draw. MD stated Heparin drip may be d/c'd at this time. 1330: Notified MD Purcell that pt refuses peripheral sticks for lab draws & is requesting a central line. Pt stated \"I just don't know where you're going to stick me. All of my veins are blown. \" MD aware that pt has PIV access & stated a central line is not indicated at this time. 1359: Pt requested pain medication- returned to room & pt asleep. Will check back. 1615: Notified MD Purcell that pt's BG is 366. MD stated to give 12u.  1930: Bedside and Verbal shift change report given to Jessica Brown (oncoming nurse) by Rajni EVANS (offgoing nurse). Report included the following information SBAR, Kardex, Intake/Output and Recent Results.

## 2017-02-07 ENCOUNTER — TELEPHONE (OUTPATIENT)
Dept: CARDIOLOGY CLINIC | Age: 62
End: 2017-02-07

## 2017-02-07 ENCOUNTER — APPOINTMENT (OUTPATIENT)
Dept: GENERAL RADIOLOGY | Age: 62
DRG: 189 | End: 2017-02-07
Payer: MEDICARE

## 2017-02-07 LAB
ALBUMIN SERPL BCP-MCNC: 2.7 G/DL (ref 3.5–5)
ANION GAP BLD CALC-SCNC: 12 MMOL/L (ref 5–15)
BUN SERPL-MCNC: 94 MG/DL (ref 6–20)
BUN/CREAT SERPL: 23 (ref 12–20)
CALCIUM SERPL-MCNC: 7.6 MG/DL (ref 8.5–10.1)
CHLORIDE SERPL-SCNC: 101 MMOL/L (ref 97–108)
CO2 SERPL-SCNC: 20 MMOL/L (ref 21–32)
CREAT SERPL-MCNC: 4.1 MG/DL (ref 0.55–1.02)
GLUCOSE BLD STRIP.AUTO-MCNC: 376 MG/DL (ref 65–100)
GLUCOSE BLD STRIP.AUTO-MCNC: 414 MG/DL (ref 65–100)
GLUCOSE BLD STRIP.AUTO-MCNC: 451 MG/DL (ref 65–100)
GLUCOSE BLD STRIP.AUTO-MCNC: 458 MG/DL (ref 65–100)
GLUCOSE BLD STRIP.AUTO-MCNC: 460 MG/DL (ref 65–100)
GLUCOSE SERPL-MCNC: 419 MG/DL (ref 65–100)
INR PPP: 2.6 (ref 0.9–1.1)
M PNEUMO IGG SER IA-ACNC: 255 U/ML (ref 0–99)
M PNEUMO IGM SER IA-ACNC: <770 U/ML (ref 0–769)
MAGNESIUM SERPL-MCNC: 1.7 MG/DL (ref 1.6–2.4)
PHOSPHATE SERPL-MCNC: 3.7 MG/DL (ref 2.6–4.7)
POTASSIUM SERPL-SCNC: 4.2 MMOL/L (ref 3.5–5.1)
PROTHROMBIN TIME: 27.4 SEC (ref 9–11.1)
SERVICE CMNT-IMP: ABNORMAL
SODIUM SERPL-SCNC: 133 MMOL/L (ref 136–145)

## 2017-02-07 PROCEDURE — 74011636637 HC RX REV CODE- 636/637: Performed by: INTERNAL MEDICINE

## 2017-02-07 PROCEDURE — 74011000250 HC RX REV CODE- 250: Performed by: INTERNAL MEDICINE

## 2017-02-07 PROCEDURE — 97116 GAIT TRAINING THERAPY: CPT

## 2017-02-07 PROCEDURE — 74011250636 HC RX REV CODE- 250/636: Performed by: PHYSICIAN ASSISTANT

## 2017-02-07 PROCEDURE — 74011250637 HC RX REV CODE- 250/637: Performed by: INTERNAL MEDICINE

## 2017-02-07 PROCEDURE — 74011250637 HC RX REV CODE- 250/637: Performed by: NURSE PRACTITIONER

## 2017-02-07 PROCEDURE — 82962 GLUCOSE BLOOD TEST: CPT

## 2017-02-07 PROCEDURE — 83735 ASSAY OF MAGNESIUM: CPT | Performed by: PHYSICIAN ASSISTANT

## 2017-02-07 PROCEDURE — 65270000029 HC RM PRIVATE

## 2017-02-07 PROCEDURE — 74011250637 HC RX REV CODE- 250/637: Performed by: PHYSICIAN ASSISTANT

## 2017-02-07 PROCEDURE — 74011250636 HC RX REV CODE- 250/636: Performed by: INTERNAL MEDICINE

## 2017-02-07 PROCEDURE — 36415 COLL VENOUS BLD VENIPUNCTURE: CPT | Performed by: INTERNAL MEDICINE

## 2017-02-07 PROCEDURE — 77010033678 HC OXYGEN DAILY

## 2017-02-07 PROCEDURE — 74000 XR ABD PORT  1 V: CPT

## 2017-02-07 PROCEDURE — 80069 RENAL FUNCTION PANEL: CPT | Performed by: INTERNAL MEDICINE

## 2017-02-07 PROCEDURE — 97530 THERAPEUTIC ACTIVITIES: CPT

## 2017-02-07 PROCEDURE — 85610 PROTHROMBIN TIME: CPT | Performed by: INTERNAL MEDICINE

## 2017-02-07 PROCEDURE — 94640 AIRWAY INHALATION TREATMENT: CPT

## 2017-02-07 RX ORDER — INSULIN LISPRO 100 [IU]/ML
10 INJECTION, SOLUTION INTRAVENOUS; SUBCUTANEOUS ONCE
Status: COMPLETED | OUTPATIENT
Start: 2017-02-07 | End: 2017-02-07

## 2017-02-07 RX ORDER — HYDROCODONE POLISTIREX AND CHLORPHENIRAMINE POLISTIREX 10; 8 MG/5ML; MG/5ML
5 SUSPENSION, EXTENDED RELEASE ORAL EVERY 12 HOURS
Status: DISCONTINUED | OUTPATIENT
Start: 2017-02-07 | End: 2017-02-10 | Stop reason: HOSPADM

## 2017-02-07 RX ADMIN — Medication 10 ML: at 22:29

## 2017-02-07 RX ADMIN — METHYLPREDNISOLONE SODIUM SUCCINATE 30 MG: 40 INJECTION, POWDER, FOR SOLUTION INTRAMUSCULAR; INTRAVENOUS at 14:23

## 2017-02-07 RX ADMIN — HYDROCODONE POLISTIREX AND CHLORPHENIRAMINE POLISTIREX 5 ML: 10; 8 SUSPENSION, EXTENDED RELEASE ORAL at 22:29

## 2017-02-07 RX ADMIN — SUCRALFATE 1 G: 1 TABLET ORAL at 06:26

## 2017-02-07 RX ADMIN — OXYCODONE HYDROCHLORIDE 10 MG: 5 TABLET ORAL at 20:28

## 2017-02-07 RX ADMIN — INSULIN LISPRO 10 UNITS: 100 INJECTION, SOLUTION INTRAVENOUS; SUBCUTANEOUS at 22:29

## 2017-02-07 RX ADMIN — HUMAN INSULIN 16 UNITS: 100 INJECTION, SUSPENSION SUBCUTANEOUS at 08:06

## 2017-02-07 RX ADMIN — CARVEDILOL 25 MG: 12.5 TABLET, FILM COATED ORAL at 17:30

## 2017-02-07 RX ADMIN — SUCRALFATE 1 G: 1 TABLET ORAL at 11:51

## 2017-02-07 RX ADMIN — INSULIN LISPRO 15 UNITS: 100 INJECTION, SOLUTION INTRAVENOUS; SUBCUTANEOUS at 11:50

## 2017-02-07 RX ADMIN — CARVEDILOL 25 MG: 12.5 TABLET, FILM COATED ORAL at 08:07

## 2017-02-07 RX ADMIN — LINACLOTIDE 290 MCG: 145 CAPSULE, GELATIN COATED ORAL at 17:30

## 2017-02-07 RX ADMIN — Medication 10 ML: at 14:23

## 2017-02-07 RX ADMIN — POLYETHYLENE GLYCOL 3350 17 G: 17 POWDER, FOR SOLUTION ORAL at 08:06

## 2017-02-07 RX ADMIN — Medication 2 TABLET: at 08:07

## 2017-02-07 RX ADMIN — SUCRALFATE 1 G: 1 TABLET ORAL at 17:37

## 2017-02-07 RX ADMIN — METHYLPREDNISOLONE SODIUM SUCCINATE 40 MG: 40 INJECTION, POWDER, FOR SOLUTION INTRAMUSCULAR; INTRAVENOUS at 06:25

## 2017-02-07 RX ADMIN — HUMAN INSULIN 30 UNITS: 100 INJECTION, SUSPENSION SUBCUTANEOUS at 17:30

## 2017-02-07 RX ADMIN — IPRATROPIUM BROMIDE AND ALBUTEROL SULFATE 3 ML: .5; 2.5 SOLUTION RESPIRATORY (INHALATION) at 08:40

## 2017-02-07 RX ADMIN — ISOSORBIDE MONONITRATE 120 MG: 30 TABLET ORAL at 08:07

## 2017-02-07 RX ADMIN — METHYLPREDNISOLONE SODIUM SUCCINATE 30 MG: 40 INJECTION, POWDER, FOR SOLUTION INTRAMUSCULAR; INTRAVENOUS at 22:29

## 2017-02-07 RX ADMIN — INSULIN LISPRO 15 UNITS: 100 INJECTION, SOLUTION INTRAVENOUS; SUBCUTANEOUS at 08:06

## 2017-02-07 RX ADMIN — CLOPIDOGREL 75 MG: 75 TABLET, FILM COATED ORAL at 08:06

## 2017-02-07 RX ADMIN — Medication 10 ML: at 06:26

## 2017-02-07 RX ADMIN — AMLODIPINE BESYLATE 10 MG: 5 TABLET ORAL at 08:07

## 2017-02-07 RX ADMIN — IPRATROPIUM BROMIDE AND ALBUTEROL SULFATE 3 ML: .5; 2.5 SOLUTION RESPIRATORY (INHALATION) at 20:03

## 2017-02-07 RX ADMIN — SUCRALFATE 1 G: 1 TABLET ORAL at 22:29

## 2017-02-07 RX ADMIN — ONDANSETRON 4 MG: 2 INJECTION INTRAMUSCULAR; INTRAVENOUS at 20:28

## 2017-02-07 RX ADMIN — IPRATROPIUM BROMIDE AND ALBUTEROL SULFATE 3 ML: .5; 2.5 SOLUTION RESPIRATORY (INHALATION) at 01:19

## 2017-02-07 RX ADMIN — PANTOPRAZOLE SODIUM 40 MG: 40 TABLET, DELAYED RELEASE ORAL at 08:07

## 2017-02-07 RX ADMIN — FLUTICASONE PROPIONATE 2 SPRAY: 50 SPRAY, METERED NASAL at 22:30

## 2017-02-07 RX ADMIN — ATORVASTATIN CALCIUM 80 MG: 20 TABLET, FILM COATED ORAL at 17:37

## 2017-02-07 RX ADMIN — WARFARIN SODIUM 3 MG: 2 TABLET ORAL at 17:30

## 2017-02-07 RX ADMIN — INSULIN LISPRO 15 UNITS: 100 INJECTION, SOLUTION INTRAVENOUS; SUBCUTANEOUS at 17:30

## 2017-02-07 RX ADMIN — IPRATROPIUM BROMIDE AND ALBUTEROL SULFATE 3 ML: .5; 2.5 SOLUTION RESPIRATORY (INHALATION) at 15:13

## 2017-02-07 NOTE — PROGRESS NOTES
[address] [facility] Bishop Sands  YOB: 1955          Assessment & Plan:   1. ROSEANN on CKD 4  · Due to IVVF ( Poor PO, 2 Diuretics) and/or Bactrim: Pre renal to ATN  · Cr worse despite being off ARB  · She is on Steroids so role of biopsy to diagnose AIN is mute:dw pt  · Await stabilizing  · On DC she will need Loops  2. CKD 4  · Cr 2.7-2/8 mg% at baseline, , Diabetic and Hypertensive Nephrosclerosis  · Hold ARB  3. DM  · Insulin  4. HTN  · BB,CCB,Thiazide,  · ARB,Loops on Hold  5. Resp Failure  · I think this is due to Exacerbation of ILD and ? PE  · I do not think this is volume  6. Obesity  · Wt loss will help  7. Smoker  · None now  8. Chronic Diastolic CHF  ·   · Stable off Loops       Subjective:   CC:ARF  HPI: Patient seen   ROSEANN was getting worse.   She is off ARB  SOB same  Cough not beter  Edema is same  ROS:as above otherwise neg for 12 sys  Current Facility-Administered Medications   Medication Dose Route Frequency    chlorpheniramine-HYDROcodone (TUSSIONEX) oral suspension 5 mL  5 mL Oral Q12H    methylPREDNISolone (PF) (SOLU-MEDROL) injection 30 mg  30 mg IntraVENous Q8H    insulin NPH (NOVOLIN N, HUMULIN N) injection 16 Units  16 Units SubCUTAneous ACB&D    nitroglycerin (NITROSTAT) tablet 0.4 mg  0.4 mg SubLINGual Q5MIN PRN    influenza vaccine 2016-17 (36mos+)(PF) (FLUZONE/FLUARIX/FLULAVAL QUAD) injection 0.5 mL  0.5 mL IntraMUSCular PRIOR TO DISCHARGE    oxyCODONE IR (ROXICODONE) tablet 10 mg  10 mg Oral Q3H PRN    polyethylene glycol (MIRALAX) packet 17 g  17 g Oral DAILY    senna-docusate (PERICOLACE) 8.6-50 mg per tablet 2 Tab  2 Tab Oral DAILY    sodium chloride (NS) flush 5-10 mL  5-10 mL IntraVENous Q8H    sodium chloride (NS) flush 5-10 mL  5-10 mL IntraVENous PRN    naloxone (NARCAN) injection 0.4 mg  0.4 mg IntraVENous PRN    ondansetron (ZOFRAN) injection 4 mg  4 mg IntraVENous Q4H PRN    glucose chewable tablet 16 g  4 Tab Oral PRN    dextrose (D50W) injection syrg 12.5-25 g  12.5-25 g IntraVENous PRN    glucagon (GLUCAGEN) injection 1 mg  1 mg IntraMUSCular PRN    insulin lispro (HUMALOG) injection   SubCUTAneous AC&HS    isosorbide mononitrate ER (IMDUR) tablet 120 mg  120 mg Oral DAILY    carvedilol (COREG) tablet 25 mg  25 mg Oral BID WITH MEALS    clopidogrel (PLAVIX) tablet 75 mg  75 mg Oral DAILY    pantoprazole (PROTONIX) tablet 40 mg  40 mg Oral DAILY    amLODIPine (NORVASC) tablet 10 mg  10 mg Oral DAILY    atorvastatin (LIPITOR) tablet 80 mg  80 mg Oral QPM    Warfarin pharmacy to dose   Other Rx Dosing/Monitoring    albuterol-ipratropium (DUO-NEB) 2.5 MG-0.5 MG/3 ML  3 mL Nebulization Q6H RT    benzonatate (TESSALON) capsule 100 mg  100 mg Oral TID PRN    fluticasone (FLONASE) 50 mcg/actuation nasal spray 2 Spray  2 Spray Both Nostrils QHS    temazepam (RESTORIL) capsule 30 mg  30 mg Oral QHS PRN    sucralfate (CARAFATE) tablet 1 g  1 g Oral AC&HS          Objective:     Vitals:  Blood pressure 144/67, pulse 69, temperature 98.8 °F (37.1 °C), resp. rate 18, height 5' 10\" (1.778 m), weight 152 kg (335 lb 1.6 oz), SpO2 96 %, not currently breastfeeding. Temp (24hrs), Av.1 °F (36.7 °C), Min:97.5 °F (36.4 °C), Max:98.8 °F (37.1 °C)      Intake and Output:  701 -  190  In: 240 [P.O.:240]  Out: -   1901 -  07  In: 480 [P.O.:480]  Out: -     Physical Exam:                Patient is intubated:  no    Physical Examination:   GENERAL ASSESSMENT: NAD  HEENT:Nontraumatic   CHEST: Crackles  HEART: S1S2  ABDOMEN: Soft,NT,  :Myers: n  EXTREMITY: EDEMA 1  NEURO:Grossly non focal          ECG/rhythm:    Data Review      No results for input(s): TNIPOC in the last 72 hours. No lab exists for component: ITNL   No results for input(s): CPK, CKMB, TROIQ in the last 72 hours.   Recent Labs      17   0130  17   1337  17   0407   NA  133*  135* 133*   K  4.2  3.8  HEMOLYZED,RECOLLECT REQUESTED   CL  101  101  100   CO2  20*  21  21   BUN  94*  91*  87*   CREA  4.10*  3.92*  3.76*   GLU  419*  340*  433*   PHOS  3.7  4.0  4.4   MG   --   1.5*  1.8   CA  7.6*  7.4*  7.6*   ALB  2.7*  2.6*  2.6*   WBC   --   21.1*  19.6*   HGB   --   7.8*  7.5*   HCT   --   23.1*  22.3*   PLT   --   316  311      Recent Labs      02/07/17   0130  02/05/17   2320  02/05/17   1630  02/05/17   1009  02/05/17   0407   INR  2.6*  2.0*   --    --   1.5*   PTP  27.4*  20.2*   --    --   15.1*   APTT   --   57.4*  65.1*  89.6*  52.7*     Needs: urine analysis, urine sodium, protein and creatinine  Lab Results   Component Value Date/Time    Sodium urine, random 25 11/10/2014 12:40 PM    Creatinine, urine 115.07 02/04/2017 09:33 AM         Discussed with:  pt    : Ivette Cody MD  2/7/2017        [facility]:  [email or web address]  [place] office:  [address] [place] [address], [address] [place] [address] [address]  [address]  Phone: [phone]  Fax :     [phone]    [place] office:  [address] [facility], [facility] [address]  Phone - [phone]  Fax - [phone]

## 2017-02-07 NOTE — DIABETES MGMT
DTC Consult Note     POC Glucose last 24hrs:     Lab Results   Component Value Date/Time     (H) 2017 01:30 AM     (H) 2017 01:37 PM    GLUCPOC 414 (H) 2017 07:19 AM    GLUCPOC 430 (H) 2017 09:33 PM    GLUCPOC 366 (H) 2017 04:04 PM        Recommendations/ Comments: Pt was seen by DTC staff on 2/3/2017 at that time dietary modifications were suggested. Reconsulted. Will follow up on an outpatient basis. Glucose, however, elevated. Fasting glucose per am lab: 419 mg/dL. Required 47 units of correction in the past 24 hours. If appropriate, please consider the followin. Increasing NPH from 16 units twice a day to 20 units 3 times a day. This will aid in managing the hyperglycemia associated with methylprednisolone, as the hyperglycemia associated with IV steroids coincides with the NPH peak. They can be tapered together if linked in [address] [place] [address]. *Weight-based calculations based on guidelines from  Helena Sheikh., & Doctor Geoff Schulz, LBonita (2009). Glucocorticoid-induced hyperglycemia. Endocrine Practice, 15(5), M6500640. Consult received from Sharita Pascual MD   for  []    Assessment of home management  []    Meal planning  []    Meter / Monitoring  []    New Diagnosis  []    Insulin education  []    Insulin pump notification  []    Medication recommendations  []    Hospital glucose management  []    Keturah Meléndez  [x]    Outpatient Education - Information forwarded to [facility] who will follow-up with pt 1 week after discharge     Hospital (inpatient) medications:  1. Correction Scale: Lispro (Humalog) Insulin Resistant Sensitivity scale to cover for glucose > 139 mg/dL before meals and for glucose >199 at bedtime      Assessment / Plan:     Chart reviewed and initial evaluation complete on [facility] . Kesha Garduno is a 64 yo AA female with a past medical history significant for  DM type 2, per Dr. Paola Botello H&P dated 2017.  She sees  Alexus Patel MD ([facility]) on an outpatient basis. Per patient home medications are  1. Novolog Mix 70/30 - on a sliding scale after meals  For glucose <150 - 60 units; will add 20 units for every 50 points above 150. A1C:   Lab Results   Component Value Date/Time    Hemoglobin A1c 6.6 02/03/2017 03:30 AM    Hemoglobin A1c 5.2 11/09/2014 04:00 AM         Thank you.     Kaleb Lennon    [phone]

## 2017-02-07 NOTE — TELEPHONE ENCOUNTER
Please call Mrs. Gonsales at [phone]. She's been admitted to [facility], she's been there since Thursday, 2/2/17. She'd like to see Dr. Randolph Rahman if possible. Re: blood clots in leg and possibly in lung.      Thank you, Talita Ray

## 2017-02-07 NOTE — PROGRESS NOTES
[facility] Dosing Services: 2/7/17    Consult for Warfarin Dosing by Pharmacy by Dr. Kary Mendenhall provided for this 63 y.o.  female , for indication of Acute DVT  Day of Therapy 6  Dose to achieve an INR goal of 2-3    Order entered for  Warfarin  3 (mg) ordered to be given today at 18:00. Significant drug interactions: Clopidogrel  Previous dose given 4mg on 2/6/17   PT/INR Lab Results   Component Value Date/Time    INR 2.6 02/07/2017 01:30 AM      Platelets Lab Results   Component Value Date/Time    PLATELET 062 18/43/8852 01:37 PM      H/H Lab Results   Component Value Date/Time    HGB 7.8 02/06/2017 01:37 PM        Pharmacy to follow daily and will provide subsequent Warfarin dosing based on clinical status.     Nina Rodriguez, PharmD, BCPS  Contact information [phone]

## 2017-02-07 NOTE — PROGRESS NOTES
Bedside and Verbal shift change report given to [employer], RN (oncoming nurse) by Niru Sheehan RN (offgoing nurse). Report included the following information SBAR, Kardex, Intake/Output, Recent Results and Med Rec Status.

## 2017-02-07 NOTE — PROGRESS NOTES
Name: Arias Luis: [address] [place] [address] Dr CESARB: 1955 Admit Date: 2017   Phone: [phone]  Room: [facility]   PCP: None  MRN: [id number]   Date: 2017  Code: Full Code          Chart and notes reviewed. Data reviewed. I review the patient's current medications in the medical record at each encounter. I have evaluated and examined the patient. Overnight events  Afebrile  Sats 98% on 3L  WBC 21.1  Hgb 7.8 - stable  Na 133  Creat 4.10 - trending up  Mag 1.5  INR 2.6  Glucose elevated  Resp cx no growth  Pna work up negative    ROS: Feeling about the same this morning. Continue with nonproductive cough and SOB. Continues to wheeze. Feels constipated. States her abd is swollen and bloated/painful. Does not think she is even passing gas. Denies CP but does have chest wall soreness with coughing.         Current Facility-Administered Medications   Medication Dose Route Frequency    chlorpheniramine-HYDROcodone (TUSSIONEX) oral suspension 5 mL  5 mL Oral Q12H    insulin NPH (NOVOLIN N, HUMULIN N) injection 16 Units  16 Units SubCUTAneous ACB&D    nitroglycerin (NITROSTAT) tablet 0.4 mg  0.4 mg SubLINGual Q5MIN PRN    influenza vaccine - (36mos+)(PF) (FLUZONE/FLUARIX/FLULAVAL QUAD) injection 0.5 mL  0.5 mL IntraMUSCular PRIOR TO DISCHARGE    methylPREDNISolone (PF) (SOLU-MEDROL) injection 40 mg  40 mg IntraVENous Q8H    oxyCODONE IR (ROXICODONE) tablet 10 mg  10 mg Oral Q3H PRN    polyethylene glycol (MIRALAX) packet 17 g  17 g Oral DAILY    senna-docusate (PERICOLACE) 8.6-50 mg per tablet 2 Tab  2 Tab Oral DAILY    sodium chloride (NS) flush 5-10 mL  5-10 mL IntraVENous Q8H    sodium chloride (NS) flush 5-10 mL  5-10 mL IntraVENous PRN    naloxone (NARCAN) injection 0.4 mg  0.4 mg IntraVENous PRN    ondansetron (ZOFRAN) injection 4 mg  4 mg IntraVENous Q4H PRN    glucose chewable tablet 16 g  4 Tab Oral PRN    dextrose (D50W) injection syrg 12.5-25 g  12.5-25 g IntraVENous PRN    glucagon (GLUCAGEN) injection 1 mg  1 mg IntraMUSCular PRN    insulin lispro (HUMALOG) injection   SubCUTAneous AC&HS    isosorbide mononitrate ER (IMDUR) tablet 120 mg  120 mg Oral DAILY    carvedilol (COREG) tablet 25 mg  25 mg Oral BID WITH MEALS    clopidogrel (PLAVIX) tablet 75 mg  75 mg Oral DAILY    pantoprazole (PROTONIX) tablet 40 mg  40 mg Oral DAILY    amLODIPine (NORVASC) tablet 10 mg  10 mg Oral DAILY    atorvastatin (LIPITOR) tablet 80 mg  80 mg Oral QPM    Warfarin pharmacy to dose   Other Rx Dosing/Monitoring    albuterol-ipratropium (DUO-NEB) 2.5 MG-0.5 MG/3 ML  3 mL Nebulization Q6H RT    benzonatate (TESSALON) capsule 100 mg  100 mg Oral TID PRN    fluticasone (FLONASE) 50 mcg/actuation nasal spray 2 Spray  2 Spray Both Nostrils QHS    temazepam (RESTORIL) capsule 30 mg  30 mg Oral QHS PRN    sucralfate (CARAFATE) tablet 1 g  1 g Oral AC&HS         REVIEW OF SYSTEMS   Negative except as stated in the HPI. Physical Exam:   Visit Vitals    /67 (BP 1 Location: Left arm, BP Patient Position: Sitting)    Pulse 69    Temp 98.8 °F (37.1 °C)    Resp 18    Ht 5' 10\" (1.778 m)    Wt 152 kg (335 lb 1.6 oz)    SpO2 96%    Breastfeeding No    BMI 48.08 kg/m2       General:  Alert, cooperative, no distress, appears stated age. Head:  Normocephalic, without obvious abnormality, atraumatic. Eyes:  Conjunctivae/corneas clear. Nose: Nares normal. Septum midline. Mucosa normal.    Throat: Lips, mucosa, and tongue normal. Class 4 airway. Neck: Thick, supple, symmetrical, trachea midline, no adenopathy. Lungs:   Mildly coarse breath sounds with mild wheezing bilaterally. Chest wall:  No tenderness or deformity. Heart:  Regular rate and rhythm, S1, S2 normal, no murmur, click, rub or gallop. Abdomen:   Obese, soft, generalized TTP. Bowel sounds normal. No masses,  No organomegaly.    Extremities: Extremities normal, atraumatic, no cyanosis, +LE edema, R>L. Pulses: 2+ and symmetric all extremities. Skin: Skin color, texture, turgor normal. No rashes or lesions   Lymph nodes: Cervical, supraclavicular nodes normal.   Neurologic: Grossly nonfocal       Lab Results   Component Value Date/Time    Sodium 133 02/07/2017 01:30 AM    Potassium 4.2 02/07/2017 01:30 AM    Chloride 101 02/07/2017 01:30 AM    CO2 20 02/07/2017 01:30 AM    BUN 94 02/07/2017 01:30 AM    Creatinine 4.10 02/07/2017 01:30 AM    Glucose 419 02/07/2017 01:30 AM    Calcium 7.6 02/07/2017 01:30 AM    Magnesium 1.5 02/06/2017 01:37 PM    Phosphorus 3.7 02/07/2017 01:30 AM    Lactic acid 1.2 02/02/2017 03:20 PM       Lab Results   Component Value Date/Time    WBC 21.1 02/06/2017 01:37 PM    HGB 7.8 02/06/2017 01:37 PM    PLATELET 861 44/51/1397 01:37 PM    MCV 89.5 02/06/2017 01:37 PM       Lab Results   Component Value Date/Time    INR 2.6 02/07/2017 01:30 AM    aPTT 57.4 02/05/2017 11:20 PM    AST (SGOT) 11 02/02/2017 03:20 PM    Alk.  phosphatase 79 02/02/2017 03:20 PM    Protein, total 6.9 02/02/2017 03:20 PM    Albumin 2.7 02/07/2017 01:30 AM    Globulin 4.2 02/02/2017 03:20 PM       Lab Results   Component Value Date/Time    Iron 29 11/09/2014 03:30 AM    TIBC 245 11/09/2014 03:30 AM    Iron % saturation 12 11/09/2014 03:30 AM    Ferritin 255 11/09/2014 04:00 AM       No results found for: SR, CRP, GLENNY, ANAIGG, RA, RPR, RPRT, VDRLT, VDRLS, TSH, TSHEXT, TSHEXT     No results found for: PH, PHI, PCO2, PCO2I, PO2, PO2I, HCO3, HCO3I, FIO2, FIO2I    Lab Results   Component Value Date/Time    CK 72 07/01/2015 07:42 PM    CK-MB Index CANNOT BE CALCULATED 07/01/2015 07:42 PM    Troponin-I, Qt. <0.04 02/03/2017 03:30 AM        Lab Results   Component Value Date/Time    Culture result: HEAVY  NORMAL RESPIRATORY WAGNER   02/03/2017 04:10 PM    Culture result: MODERATE  YEAST   02/03/2017 04:10 PM    Culture result: NO GROWTH 5 DAYS 02/02/2017 03:20 PM       No results found for: TOXA1, RPR, HBCM, HBSAG, HAAB, HCAB, HCAB1, HAAT, G6PD, CRYAC, HIVGT, HIVR, HIV1, HIV12, HIVPC, HIVRPI    Lab Results   Component Value Date/Time    CK 72 07/01/2015 07:42 PM    CK 42 11/09/2014 03:30 AM       Lab Results   Component Value Date/Time    Color YELLOW/STRAW 02/03/2017 05:36 AM    Appearance CLEAR 02/03/2017 05:36 AM    pH (UA) 5.0 02/03/2017 05:36 AM    Protein 100 02/03/2017 05:36 AM    Glucose NEGATIVE  02/03/2017 05:36 AM    Ketone NEGATIVE  02/03/2017 05:36 AM    Bilirubin NEGATIVE  02/03/2017 05:36 AM    Blood NEGATIVE  02/03/2017 05:36 AM    Urobilinogen 0.2 02/03/2017 05:36 AM    Nitrites NEGATIVE  02/03/2017 05:36 AM    Leukocyte Esterase NEGATIVE  02/03/2017 05:36 AM    WBC 0-4 02/03/2017 05:36 AM    RBC 0-5 02/03/2017 05:36 AM    Bacteria NEGATIVE  02/03/2017 05:36 AM       Images: no new images this morning    IMPRESSION  · Acute/chronic hypoxic respiratory failure: ? ILD exacerbation  · RLE DVT; may also have PE, but allergic to contrast and had CKD, so not able to check CTA. Agree that V/Q not likley to be helpful in light of ILD.   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 at [facility] in 2010  · JASEN  · Chest pain  · Diastolic CHF  · Pulmonary HTN  · HTN  · Acute/chronic kidney disease stage 4  · History of tobacco use; quit 11/2014    PLAN  · Continue O2 and wean as able to keep sats > 90%  · F/U broch cytology  · Continue scheduled Duonebs and wean Solumedrol to 30 mg q 8hr  · Check KUB  · No additional abx for now given multiple recent abx courses and no growth on bronch  · Continue Tessalon, Tussionex, and Flonase  · Diuresis per Renal; hloding loops and ARB   · Cardiology eval appreciated  · Coumadin: will  Need to remain on anticoagulation for at least 3-6 months  · Repeat Mag and replete if needed  · CPAP nightly  · Mobilize with PT  · Bowel regimen   · GI prophylaxis: [address] [place], [place]

## 2017-02-07 NOTE — PROGRESS NOTES
[facility]  [address] [facility], [id number] [place] [address]  [phone]      Medical Progress Note      NAME: Lorie Anderson   :  1955  MRM:  [id number]    Date/Time: 2017           Subjective:     Chief Complaint:  I feel distended    No acute events. Still no bowel movement. She still has a cough but feels her sob is improved. Objective:       Vitals:          Last 24hrs VS reviewed since prior progress note.  Most recent are:    Visit Vitals    /69 (BP 1 Location: Left arm, BP Patient Position: At rest)    Pulse 66    Temp 98.7 °F (37.1 °C)    Resp 18    Ht 5' 10\" (1.778 m)    Wt 152 kg (335 lb 1.6 oz)    SpO2 97%    Breastfeeding No    BMI 48.08 kg/m2     SpO2 Readings from Last 6 Encounters:   17 97%   17 98%   10/14/16 98%   16 94%   16 97%   16 100%    O2 Flow Rate (L/min): 3 l/min       Intake/Output Summary (Last 24 hours) at 17 1458  Last data filed at 17 1322   Gross per 24 hour   Intake              960 ml   Output                0 ml   Net              960 ml          Exam:     Physical Exam:    Gen:  obese, in no acute distress  HEENT:  Pink conjunctivae, PERRL, hearing intact to voice, moist mucous membranes  Neck:  Supple, without masses, thyroid non-tender  Resp:  No accessory muscle use, mild rhonchi bilateral  Card:  No murmurs, normal S1, S2 without thrills, bruits or peripheral edema  Abd:  Soft, non-tender, non-distended, normoactive bowel sounds are present  Musc:  No cyanosis or clubbing  Skin:  No rashes or ulcers, skin turgor is good  Neuro:  No focal deficits, follows commands appropriately  Psych:  Good insight, oriented to person, place and time, alert      Medications Reviewed: (see below)    Lab Data Reviewed: (see below)    ______________________________________________________________________    Medications:     Current Facility-Administered Medications   Medication Dose Route Frequency    chlorpheniramine-HYDROcodone (TUSSIONEX) oral suspension 5 mL  5 mL Oral Q12H    methylPREDNISolone (PF) (SOLU-MEDROL) injection 30 mg  30 mg IntraVENous Q8H    insulin NPH (NOVOLIN N, HUMULIN N) injection 30 Units  30 Units SubCUTAneous ACB&D    warfarin (COUMADIN) tablet 3 mg  3 mg Oral ONCE    linaclotide (LINZESS) capsule 290 mcg  290 mcg Oral ACB    sodium phosphate (FLEET'S) enema 118 mL  1 Enema Rectal NOW    nitroglycerin (NITROSTAT) tablet 0.4 mg  0.4 mg SubLINGual Q5MIN PRN    influenza vaccine 2016-17 (36mos+)(PF) (FLUZONE/FLUARIX/FLULAVAL QUAD) injection 0.5 mL  0.5 mL IntraMUSCular PRIOR TO DISCHARGE    oxyCODONE IR (ROXICODONE) tablet 10 mg  10 mg Oral Q3H PRN    polyethylene glycol (MIRALAX) packet 17 g  17 g Oral DAILY    senna-docusate (PERICOLACE) 8.6-50 mg per tablet 2 Tab  2 Tab Oral DAILY    sodium chloride (NS) flush 5-10 mL  5-10 mL IntraVENous Q8H    sodium chloride (NS) flush 5-10 mL  5-10 mL IntraVENous PRN    naloxone (NARCAN) injection 0.4 mg  0.4 mg IntraVENous PRN    ondansetron (ZOFRAN) injection 4 mg  4 mg IntraVENous Q4H PRN    glucose chewable tablet 16 g  4 Tab Oral PRN    dextrose (D50W) injection syrg 12.5-25 g  12.5-25 g IntraVENous PRN    glucagon (GLUCAGEN) injection 1 mg  1 mg IntraMUSCular PRN    insulin lispro (HUMALOG) injection   SubCUTAneous AC&HS    isosorbide mononitrate ER (IMDUR) tablet 120 mg  120 mg Oral DAILY    carvedilol (COREG) tablet 25 mg  25 mg Oral BID WITH MEALS    clopidogrel (PLAVIX) tablet 75 mg  75 mg Oral DAILY    pantoprazole (PROTONIX) tablet 40 mg  40 mg Oral DAILY    amLODIPine (NORVASC) tablet 10 mg  10 mg Oral DAILY    atorvastatin (LIPITOR) tablet 80 mg  80 mg Oral QPM    Warfarin pharmacy to dose   Other Rx Dosing/Monitoring    albuterol-ipratropium (DUO-NEB) 2.5 MG-0.5 MG/3 ML  3 mL Nebulization Q6H RT    benzonatate (TESSALON) capsule 100 mg  100 mg Oral TID PRN    fluticasone (FLONASE) 50 mcg/actuation nasal spray 2 Spray  2 Spray Both Nostrils QHS    temazepam (RESTORIL) capsule 30 mg  30 mg Oral QHS PRN    sucralfate (CARAFATE) tablet 1 g  1 g Oral AC&HS            Lab Review:     Recent Labs      02/06/17   1337  02/05/17   0407   WBC  21.1*  19.6*   HGB  7.8*  7.5*   HCT  23.1*  22.3*   PLT  316  311     Recent Labs      02/07/17   0130  02/06/17   1337  02/05/17   2320  02/05/17   0407   NA  133*  135*   --   133*   K  4.2  3.8   --   HEMOLYZED,RECOLLECT REQUESTED   CL  101  101   --   100   CO2  20*  21   --   21   GLU  419*  340*   --   433*   BUN  94*  91*   --   87*   CREA  4.10*  3.92*   --   3.76*   CA  7.6*  7.4*   --   7.6*   MG  1.7  1.5*   --   1.8   PHOS  3.7  4.0   --   4.4   ALB  2.7*  2.6*   --   2.6*   INR  2.6*   --   2.0*  1.5*     No components found for: [place]         Assessment / Plan:     Acute on chronic hypoxemic respiratory failure / Interstitial lung disease: unclear precipitant. Underwent bronch on 2/3, found to have mild mucoid secretions noted bilaterally consistent with mild bilateral pneumonia. S/p tx with multiple rounds of abx, further abx tx on hold. Continue IV steroids (currently weaning) and nebs.       Acute DVT (deep venous thrombosis): continue coumadin, INR therapeutic    Chronic diastolic heart failure / Pulmonary HTN / CAD (coronary Artery Disease) Native Artery: in setting of worsening SOB. Continue statin, coreg, imdur. ARB and bumex on hold      HTN (hypertension): cont Norvasc, Coreg, ISMN      Morbid obesity:  Counseled on weight loss      ROSEANN on CKD (chronic kidney disease) stage 4: nephrology following. Cr stable today. holding ARB and diuretic      Anemia: likely ACD from CKD     DM (diabetes mellitus), type 2 with renal complications: poorly controlled on steroids. Worsened with steroids. Labile BG, A1c 6.6%. Increase NPH today    Constipation: no BM despite suppository yesterday. Order linzess (takes at home).  Order enema    Total time spent with patient: 39 [address] [facility] discussed with: Patient, Family and Consultant/Specialist    Discussed:  Care Plan    Prophylaxis:  Coumadin    Disposition:  Home w/Family           ___________________________________________________    Attending Physician: Cyndra Fleischer, MD

## 2017-02-07 NOTE — TELEPHONE ENCOUNTER
Please call Mrs. Gonsales at [phone].  She's been admitted to [facility], she's been there since Thursday, 2/2/17.  She'd like to see Dr. Bolanos if possible.  Re: blood clots in leg and possibly in lung.     Thank you, Aura

## 2017-02-07 NOTE — PROGRESS NOTES
Problem: Mobility Impaired (Adult and Pediatric)  Goal: *Acute Goals and Plan of Care (Insert Text)  Physical Therapy Goals  Initiated 2/3/2017  1. Patient will move from supine to sit and sit to supine in bed with independence within 7 day(s). 2. Patient will transfer from bed to chair and chair to bed with modified independence using the least restrictive device within 7 day(s). 3. Patient will perform sit to stand with independence within 7 day(s). 4. Patient will ambulate with independence for 75 feet with the least restrictive device within 7 day(s). 5. Patient will ascend/descend 2 stairs with 0 handrail(s) with independence within 7 day(s). PHYSICAL THERAPY TREATMENT  Patient: Luis Daniel Landry (21 y.o. female)  Date: 2/7/2017  Diagnosis: DVT (deep venous thrombosis) ([facility])  airway survey <principal problem not specified>  Procedure(s) (LRB):  BRONCHOSCOPY (N/A)  BRONCHIAL ALVEOLAR LAVAGE (Right) 4 Days Post-Op  Precautions:        ASSESSMENT:  Based on the objective data described below, patient tolerated treatment very well. Ambulate without assistive device on the 5th floor hallway, supervision. Decreased pace, no loss of balance, steady with sitting and standing balance. OOB to chair as tolerated, performed some active range of motion exercise on both LE all planes. Tolerated ambulation with 3 liters O2 oxygen saturation 92% to 97% during and after ambulation. Notified nurse who agreed to monitor patient. Progression toward goals:  [X]    Improving appropriately and progressing toward goals  [ ]    Improving slowly and progressing toward goals  [ ]    Not making progress toward goals and plan of care will be adjusted       PLAN:  Patient continues to benefit from skilled intervention to address the above impairments. Continue treatment per established plan of care.   Discharge Recommendations:  None  Further Equipment Recommendations for Discharge:  none       SUBJECTIVE:   Patient stated I feel better today.       OBJECTIVE DATA SUMMARY:   Critical Behavior:  Neurologic State: Alert  Orientation Level: Oriented X4  Cognition: Appropriate for age attention/concentration  Safety/Judgement: Awareness of environment  Functional Mobility Training:  Bed Mobility:  Rolling: Independent  Supine to Sit: Independent  Sit to Supine: Independent  Scooting: Independent        Transfers:  Sit to Stand: Supervision  Stand to Sit: Supervision  Stand Pivot Transfers: Supervision     Bed to Chair: Supervision                    Balance:  Sitting: Intact  Standing: Intact; Without support  Standing - Static: Good  Standing - Dynamic : Good  Ambulation/Gait Training:  Distance (ft): 200 Feet (ft)  Assistive Device:  (none)  Ambulation - Level of Assistance: Supervision     Gait Description (WDL): Exceptions to WDL  Gait Abnormalities: Path deviations        Base of Support: Widened     Speed/Nichole: Pace decreased (<100 feet/min)                    Therapeutic Exercises:    Instructed patient to continue active range of motion exercise on both legs while up on chair or on bed. Pain:  Pain Scale 1: Numeric (0 - 10)  Pain Intensity 1: 3  Pain Location 1: Chest  Pain Orientation 1: Anterior  Pain Description 1:  (\"Discomfort\")  Pain Intervention(s) 1: Declines  Activity Tolerance:   Good. Please refer to the flowsheet for vital signs taken during this treatment. After treatment:   [X]    Patient left in no apparent distress sitting up in chair  [ ]    Patient left in no apparent distress in bed  [X]    Call bell left within reach  [X]    Nursing notified  [X]    Caregiver present  [ ]    Bed alarm activated      COMMUNICATION/COLLABORATION:   The patients plan of care was discussed with: Registered Nurse and patient     Pravin Fair PT,ROMULOC.    Time Calculation: 24 mins

## 2017-02-07 NOTE — PROGRESS NOTES
1524: Notified MD Purcell via phone that pt's BG was 414, MD stated to give 15u. 1146: Notified MD Purcell via phone that pt's BG was 451, MD stated to give 15u.  1718: Notified MD Dami Kent via phone that pt's BG was 376, MD stated to give 15u. 1930: Bedside and Verbal shift change report given to Marcos Beard (oncoming nurse) by Rajni EVANS (offgoing nurse). Report included the following information SBAR, Kardex, Intake/Output and Recent Results.

## 2017-02-08 LAB
ALBUMIN SERPL BCP-MCNC: 2.7 G/DL (ref 3.5–5)
ANION GAP BLD CALC-SCNC: 10 MMOL/L (ref 5–15)
BACTERIA SPEC CULT: ABNORMAL
BACTERIA SPEC CULT: ABNORMAL
BACTERIA SPEC CULT: NORMAL
BUN SERPL-MCNC: 102 MG/DL (ref 6–20)
BUN/CREAT SERPL: 26 (ref 12–20)
CALCIUM SERPL-MCNC: 8.1 MG/DL (ref 8.5–10.1)
CHLORIDE SERPL-SCNC: 103 MMOL/L (ref 97–108)
CO2 SERPL-SCNC: 22 MMOL/L (ref 21–32)
CREAT SERPL-MCNC: 3.91 MG/DL (ref 0.55–1.02)
GLUCOSE BLD STRIP.AUTO-MCNC: 334 MG/DL (ref 65–100)
GLUCOSE BLD STRIP.AUTO-MCNC: 337 MG/DL (ref 65–100)
GLUCOSE BLD STRIP.AUTO-MCNC: 349 MG/DL (ref 65–100)
GLUCOSE BLD STRIP.AUTO-MCNC: 375 MG/DL (ref 65–100)
GLUCOSE SERPL-MCNC: 329 MG/DL (ref 65–100)
INR PPP: 3.3 (ref 0.9–1.1)
MAGNESIUM SERPL-MCNC: 1.8 MG/DL (ref 1.6–2.4)
PHOSPHATE SERPL-MCNC: 3.4 MG/DL (ref 2.6–4.7)
POTASSIUM SERPL-SCNC: 4.6 MMOL/L (ref 3.5–5.1)
PROTHROMBIN TIME: 34.6 SEC (ref 9–11.1)
SERVICE CMNT-IMP: ABNORMAL
SERVICE CMNT-IMP: NORMAL
SODIUM SERPL-SCNC: 135 MMOL/L (ref 136–145)

## 2017-02-08 PROCEDURE — 74011250637 HC RX REV CODE- 250/637: Performed by: INTERNAL MEDICINE

## 2017-02-08 PROCEDURE — 80069 RENAL FUNCTION PANEL: CPT | Performed by: INTERNAL MEDICINE

## 2017-02-08 PROCEDURE — 74011250636 HC RX REV CODE- 250/636: Performed by: PHYSICIAN ASSISTANT

## 2017-02-08 PROCEDURE — 85610 PROTHROMBIN TIME: CPT | Performed by: INTERNAL MEDICINE

## 2017-02-08 PROCEDURE — 74011636637 HC RX REV CODE- 636/637: Performed by: INTERNAL MEDICINE

## 2017-02-08 PROCEDURE — 74011250636 HC RX REV CODE- 250/636: Performed by: INTERNAL MEDICINE

## 2017-02-08 PROCEDURE — 74011250637 HC RX REV CODE- 250/637: Performed by: PHYSICIAN ASSISTANT

## 2017-02-08 PROCEDURE — 97116 GAIT TRAINING THERAPY: CPT

## 2017-02-08 PROCEDURE — 77010033678 HC OXYGEN DAILY

## 2017-02-08 PROCEDURE — 74011000250 HC RX REV CODE- 250: Performed by: INTERNAL MEDICINE

## 2017-02-08 PROCEDURE — 82962 GLUCOSE BLOOD TEST: CPT

## 2017-02-08 PROCEDURE — 74011250637 HC RX REV CODE- 250/637: Performed by: NURSE PRACTITIONER

## 2017-02-08 PROCEDURE — 94640 AIRWAY INHALATION TREATMENT: CPT

## 2017-02-08 PROCEDURE — 36415 COLL VENOUS BLD VENIPUNCTURE: CPT | Performed by: INTERNAL MEDICINE

## 2017-02-08 PROCEDURE — 65270000029 HC RM PRIVATE

## 2017-02-08 PROCEDURE — 97535 SELF CARE MNGMENT TRAINING: CPT | Performed by: OCCUPATIONAL THERAPIST

## 2017-02-08 PROCEDURE — 83735 ASSAY OF MAGNESIUM: CPT | Performed by: PHYSICIAN ASSISTANT

## 2017-02-08 RX ORDER — PREDNISONE 20 MG/1
20 TABLET ORAL
Status: DISCONTINUED | OUTPATIENT
Start: 2017-02-12 | End: 2017-02-10 | Stop reason: HOSPADM

## 2017-02-08 RX ORDER — PREDNISONE 5 MG/1
10 TABLET ORAL
Status: DISCONTINUED | OUTPATIENT
Start: 2017-02-15 | End: 2017-02-10 | Stop reason: HOSPADM

## 2017-02-08 RX ORDER — DEXTROMETHORPHAN POLISTIREX 30 MG/5 ML
1 SUSPENSION, EXTENDED RELEASE 12 HR ORAL
Status: COMPLETED | OUTPATIENT
Start: 2017-02-08 | End: 2017-02-08

## 2017-02-08 RX ADMIN — HUMAN INSULIN 10 UNITS: 100 INJECTION, SUSPENSION SUBCUTANEOUS at 13:17

## 2017-02-08 RX ADMIN — ONDANSETRON 4 MG: 2 INJECTION INTRAMUSCULAR; INTRAVENOUS at 15:44

## 2017-02-08 RX ADMIN — Medication 10 ML: at 06:31

## 2017-02-08 RX ADMIN — CARVEDILOL 25 MG: 12.5 TABLET, FILM COATED ORAL at 08:40

## 2017-02-08 RX ADMIN — LINACLOTIDE 290 MCG: 145 CAPSULE, GELATIN COATED ORAL at 06:31

## 2017-02-08 RX ADMIN — Medication 10 ML: at 22:51

## 2017-02-08 RX ADMIN — ATORVASTATIN CALCIUM 80 MG: 20 TABLET, FILM COATED ORAL at 17:55

## 2017-02-08 RX ADMIN — IPRATROPIUM BROMIDE AND ALBUTEROL SULFATE 3 ML: .5; 2.5 SOLUTION RESPIRATORY (INHALATION) at 08:49

## 2017-02-08 RX ADMIN — LACTULOSE 30 G: 10 SOLUTION ORAL at 17:55

## 2017-02-08 RX ADMIN — POLYETHYLENE GLYCOL 3350 17 G: 17 POWDER, FOR SOLUTION ORAL at 08:42

## 2017-02-08 RX ADMIN — HYDROCODONE POLISTIREX AND CHLORPHENIRAMINE POLISTIREX 5 ML: 10; 8 SUSPENSION, EXTENDED RELEASE ORAL at 22:41

## 2017-02-08 RX ADMIN — LACTULOSE 30 G: 10 SOLUTION ORAL at 16:21

## 2017-02-08 RX ADMIN — SUCRALFATE 1 G: 1 TABLET ORAL at 16:22

## 2017-02-08 RX ADMIN — AMLODIPINE BESYLATE 10 MG: 5 TABLET ORAL at 08:41

## 2017-02-08 RX ADMIN — MINERAL OIL 133 ML: 100 ENEMA RECTAL at 13:06

## 2017-02-08 RX ADMIN — HUMAN INSULIN 50 UNITS: 100 INJECTION, SUSPENSION SUBCUTANEOUS at 16:23

## 2017-02-08 RX ADMIN — INSULIN LISPRO 10 UNITS: 100 INJECTION, SOLUTION INTRAVENOUS; SUBCUTANEOUS at 12:03

## 2017-02-08 RX ADMIN — INSULIN LISPRO 10 UNITS: 100 INJECTION, SOLUTION INTRAVENOUS; SUBCUTANEOUS at 08:41

## 2017-02-08 RX ADMIN — INSULIN LISPRO 5 UNITS: 100 INJECTION, SOLUTION INTRAVENOUS; SUBCUTANEOUS at 22:00

## 2017-02-08 RX ADMIN — HUMAN INSULIN 30 UNITS: 100 INJECTION, SUSPENSION SUBCUTANEOUS at 08:41

## 2017-02-08 RX ADMIN — METHYLPREDNISOLONE SODIUM SUCCINATE 30 MG: 40 INJECTION, POWDER, FOR SOLUTION INTRAMUSCULAR; INTRAVENOUS at 13:06

## 2017-02-08 RX ADMIN — PANTOPRAZOLE SODIUM 40 MG: 40 TABLET, DELAYED RELEASE ORAL at 08:41

## 2017-02-08 RX ADMIN — SUCRALFATE 1 G: 1 TABLET ORAL at 06:31

## 2017-02-08 RX ADMIN — SUCRALFATE 1 G: 1 TABLET ORAL at 22:41

## 2017-02-08 RX ADMIN — OXYCODONE HYDROCHLORIDE 10 MG: 5 TABLET ORAL at 22:41

## 2017-02-08 RX ADMIN — Medication 2 TABLET: at 08:40

## 2017-02-08 RX ADMIN — METHYLPREDNISOLONE SODIUM SUCCINATE 30 MG: 40 INJECTION, POWDER, FOR SOLUTION INTRAMUSCULAR; INTRAVENOUS at 06:31

## 2017-02-08 RX ADMIN — INSULIN LISPRO 15 UNITS: 100 INJECTION, SOLUTION INTRAVENOUS; SUBCUTANEOUS at 16:22

## 2017-02-08 RX ADMIN — SUCRALFATE 1 G: 1 TABLET ORAL at 12:03

## 2017-02-08 RX ADMIN — CARVEDILOL 25 MG: 12.5 TABLET, FILM COATED ORAL at 16:22

## 2017-02-08 RX ADMIN — OXYCODONE HYDROCHLORIDE 10 MG: 5 TABLET ORAL at 15:37

## 2017-02-08 RX ADMIN — ISOSORBIDE MONONITRATE 120 MG: 30 TABLET ORAL at 08:41

## 2017-02-08 RX ADMIN — FLUTICASONE PROPIONATE 2 SPRAY: 50 SPRAY, METERED NASAL at 22:40

## 2017-02-08 RX ADMIN — IPRATROPIUM BROMIDE AND ALBUTEROL SULFATE 3 ML: .5; 2.5 SOLUTION RESPIRATORY (INHALATION) at 01:13

## 2017-02-08 RX ADMIN — Medication 10 ML: at 13:06

## 2017-02-08 RX ADMIN — ONDANSETRON 4 MG: 2 INJECTION INTRAMUSCULAR; INTRAVENOUS at 22:42

## 2017-02-08 RX ADMIN — IPRATROPIUM BROMIDE AND ALBUTEROL SULFATE 3 ML: .5; 2.5 SOLUTION RESPIRATORY (INHALATION) at 15:39

## 2017-02-08 RX ADMIN — IPRATROPIUM BROMIDE AND ALBUTEROL SULFATE 3 ML: .5; 2.5 SOLUTION RESPIRATORY (INHALATION) at 19:57

## 2017-02-08 RX ADMIN — LACTULOSE 30 G: 10 SOLUTION ORAL at 22:41

## 2017-02-08 RX ADMIN — CLOPIDOGREL 75 MG: 75 TABLET, FILM COATED ORAL at 08:41

## 2017-02-08 NOTE — PROGRESS NOTES
[address] [facility] [place]  YOB: 1955          Assessment & Plan:   1. ROSEANN on CKD 4  · Cr stabilizing at new level  · ROSEANN Due to IVVF ( Poor PO, 2 Diuretics) and/or Bactrim: Pre renal to ATN  · off ARB and diuretics  · She is on Steroids so role of biopsy to diagnose AIN is mute:dw pt  · Await stabilizing  · On DC she will need Loops  · BUN is up due to Steroids and IVVD, Also ROSEANN/CKD  2. CKD 4  · Cr 2.7-2/8 mg% at baseline, , Diabetic and Hypertensive Nephrosclerosis  · Hold ARB  3. DM  · Insulin  4. HTN  · BB,CCB,Thiazide,  · ARB,Loops on Hold  5. Resp Failure  · I think this is due to Exacerbation of ILD and ? PE  · I do not think this is volume  6. Obesity  · Wt loss will help  7. Smoker  · None now  8.  Chronic Diastolic CHF  ·   · Stable off Loops       Subjective:   CC:ARF  HPI: Patient seen   ROSEANN is stabilizing now  She is off ARB  SOB    Edema is same  ROS:as above otherwise neg for 12 sys  Current Facility-Administered Medications   Medication Dose Route Frequency    Warfarin - Hold dose 2/8/17   Other ONCE    chlorpheniramine-HYDROcodone (TUSSIONEX) oral suspension 5 mL  5 mL Oral Q12H    methylPREDNISolone (PF) (SOLU-MEDROL) injection 30 mg  30 mg IntraVENous Q8H    insulin NPH (NOVOLIN N, HUMULIN N) injection 30 Units  30 Units SubCUTAneous ACB&D    linaclotide (LINZESS) capsule 290 mcg  290 mcg Oral ACB    nitroglycerin (NITROSTAT) tablet 0.4 mg  0.4 mg SubLINGual Q5MIN PRN    influenza vaccine 2016-17 (36mos+)(PF) (FLUZONE/FLUARIX/FLULAVAL QUAD) injection 0.5 mL  0.5 mL IntraMUSCular PRIOR TO DISCHARGE    oxyCODONE IR (ROXICODONE) tablet 10 mg  10 mg Oral Q3H PRN    polyethylene glycol (MIRALAX) packet 17 g  17 g Oral DAILY    senna-docusate (PERICOLACE) 8.6-50 mg per tablet 2 Tab  2 Tab Oral DAILY    sodium chloride (NS) flush 5-10 mL  5-10 mL IntraVENous Q8H    sodium chloride (NS) flush 5-10 mL  5-10 mL IntraVENous PRN    naloxone (NARCAN) injection 0.4 mg  0.4 mg IntraVENous PRN    ondansetron (ZOFRAN) injection 4 mg  4 mg IntraVENous Q4H PRN    glucose chewable tablet 16 g  4 Tab Oral PRN    dextrose (D50W) injection syrg 12.5-25 g  12.5-25 g IntraVENous PRN    glucagon (GLUCAGEN) injection 1 mg  1 mg IntraMUSCular PRN    insulin lispro (HUMALOG) injection   SubCUTAneous AC&HS    isosorbide mononitrate ER (IMDUR) tablet 120 mg  120 mg Oral DAILY    carvedilol (COREG) tablet 25 mg  25 mg Oral BID WITH MEALS    clopidogrel (PLAVIX) tablet 75 mg  75 mg Oral DAILY    pantoprazole (PROTONIX) tablet 40 mg  40 mg Oral DAILY    amLODIPine (NORVASC) tablet 10 mg  10 mg Oral DAILY    atorvastatin (LIPITOR) tablet 80 mg  80 mg Oral QPM    Warfarin pharmacy to dose   Other Rx Dosing/Monitoring    albuterol-ipratropium (DUO-NEB) 2.5 MG-0.5 MG/3 ML  3 mL Nebulization Q6H RT    benzonatate (TESSALON) capsule 100 mg  100 mg Oral TID PRN    fluticasone (FLONASE) 50 mcg/actuation nasal spray 2 Spray  2 Spray Both Nostrils QHS    temazepam (RESTORIL) capsule 30 mg  30 mg Oral QHS PRN    sucralfate (CARAFATE) tablet 1 g  1 g Oral AC&HS          Objective:     Vitals:  Blood pressure 166/76, pulse 68, temperature 98.8 °F (37.1 °C), resp. rate 18, height 5' 10\" (1.778 m), weight 152 kg (335 lb 1.6 oz), SpO2 94 %, not currently breastfeeding. Temp (24hrs), Av.2 °F (36.8 °C), Min:97.2 °F (36.2 °C), Max:98.8 °F (37.1 °C)      Intake and Output:  701 -  1900  In: 240 [P.O.:240]  Out: -    190 -  0700  In: 1200 [P.O.:1200]  Out: -     Physical Exam:                Patient is intubated:  no    Physical Examination:   GENERAL ASSESSMENT: NAD  HEENT:Nontraumatic   CHEST: Crackles  HEART: S1S2  ABDOMEN: Soft,NT,  :Myers: n  EXTREMITY: EDEMA 1  NEURO:Grossly non focal          ECG/rhythm:    Data Review      No results for input(s): TNIPOC in the last 72 hours.     No lab exists for component: ITNL   No results for input(s): CPK, CKMB, TROIQ in the last 72 hours. Recent Labs      02/08/17   0700  02/07/17   0130  02/06/17   1337   NA  135*  133*  135*   K  4.6  4.2  3.8   CL  103  101  101   CO2  22  20*  21   BUN  102*  94*  91*   CREA  3.91*  4.10*  3.92*   GLU  329*  419*  340*   PHOS  3.4  3.7  4.0   MG   --   1.7  1.5*   CA  8.1*  7.6*  7.4*   ALB  2.7*  2.7*  2.6*   WBC   --    --   21.1*   HGB   --    --   7.8*   HCT   --    --   23.1*   PLT   --    --   316      Recent Labs      02/08/17   0700  02/07/17   0130  02/05/17   2320  02/05/17   1630   INR  3.3*  2.6*  2.0*   --    PTP  34.6*  27.4*  20.2*   --    APTT   --    --   57.4*  65.1*     Needs: urine analysis, urine sodium, protein and creatinine  Lab Results   Component Value Date/Time    Sodium urine, random 25 11/10/2014 12:40 PM    Creatinine, urine 115.07 02/04/2017 09:33 AM         Discussed with:  pt    : Alyssa Pack MD  2/8/2017        [facility]:  [email or web address]  [place] office:  [address] [facility], [address] [place] [address],[address]  [place], [address] [facility]  Phone: [phone]  Fax :     [phone]    [place] office:  [address] [facility], [address] [facility]  Phone - [phone]  Fax - [phone]

## 2017-02-08 NOTE — PROGRESS NOTES
Problem: Self Care Deficits Care Plan (Adult)  Goal: *Acute Goals and Plan of Care (Insert Text)  Occupational Therapy Goals  Initiated 2/3/2017. Pt met all goals at Mod I level 2/8/2017.  1. Patient will perform lower body dressing with independence within 7 day(s). 2. Patient will perform toilet transfers with independence within 7 day(s). 3. Patient will participate in upper extremity therapeutic exercise/activities with independence for 10 minutes within 7 day(s). 4. Patient will utilize energy conservation techniques during functional activities with verbal cues within 7 day(s). OCCUPATIONAL THERAPY TREATMENT/DISCHARGE  Patient: Elian Carreno (89 y.o. female)  Date: 2/8/2017  Diagnosis: DVT (deep venous thrombosis) ([facility])  airway survey <principal problem not specified>  Procedure(s) (LRB):  BRONCHOSCOPY (N/A)  BRONCHIAL ALVEOLAR LAVAGE (Right) 5 Days Post-Op  Precautions:  fall      ASSESSMENT:  Pt has made steady progress towards goals and is now at an overall mod I level with ADLs and functional mobility in room and bathroom. Pt was educated on energy conservation techniques this session with written handout provided. Pt verbalized good understanding and was able to discuss modifications she already has in place at home to perform ADL and IADL tasks. Further skilled acute OT is no longer required or desired by pt at this time and OT orders will be completed. Progression toward goals:  [X]            Improving appropriately and progressing toward goals  [ ]            Improving slowly and progressing toward goals  [ ]            Not making progress toward goals and plan of care will be adjusted       PLAN:  Patient will be discharged from occupational therapy at this time.   Rationale for discharge:  [X]   Goals Achieved  [ ]   Norma Morales  [ ]   Patient not participating in therapy  [ ]   Other:  Discharge Recommendations:  None for OT  Further Equipment Recommendations for Discharge: Bariatric rollator       SUBJECTIVE:   Patient stated I feel better.       OBJECTIVE DATA SUMMARY:   Cognitive/Behavioral Status:  Neurologic State: Alert; Appropriate for age  Orientation Level: Oriented X4  Cognition: Appropriate for age attention/concentration; Follows commands  Perception: Appears intact  Perseveration: No perseveration noted  Safety/Judgement: Awareness of environment; Fall prevention;Good awareness of safety precautions; Insight into deficits     Functional Mobility and Transfers for ADLs:  Bed Mobility:  Supine to Sit: Modified independent  Sit to Supine: Modified independent     Transfers:  Sit to Stand: Modified independent  Toilet Transfer : Modified independent     Balance:  Sitting: Intact  Standing: Intact     ADL Intervention:  Patient instructed and indicated understanding energy conservation techniques to increase independence and safety during all ADLs for end goal of returning back home. Provided instruction body is like a jar of marbles, marbles represent energy, at end of day need as many marbles as possible to obtain a good night sleep, REM sleep is when the body repairs itself. This ensures a full jar of marbles, full of energy when wake up to start a new day. Use energy conservation techniques during ADLs so can increase participation in life activities patient prefers, to ensure more frequent good days. If having a bad day, evaluate tasks completed day before and re-plan how to save energy to complete same tasks, for example if going grocery shopping do not complete full bathing/dressing/grooming. Visual handout provided. Patient indicated understanding by stating tasks already completing to save energy ie sitting to bathe.       Grooming  Grooming Assistance: Modified independent (standing at sink)  Washing Hands: Modified independent     Upper Body Dressing Assistance  Pullover Shirt: Independent     Lower Body Dressing Assistance  Slip on Shoes with Back: Modified independent (increased time)  Position Performed: Long sitting on bed  Cues: Don;Verbal cues provided     Toileting  Toileting Assistance: Modified independent  Bladder Hygiene: Modified independent  Bowel Hygiene: Modified indpendent  Clothing Management: Modified independent  Adaptive Equipment: Grab bars     Cognitive Retraining  Safety/Judgement: Awareness of environment; Fall prevention;Good awareness of safety precautions; Insight into deficits     Pain:  Pain Scale 1: Numeric (0 - 10)                 Activity Tolerance:   Fair  Please refer to the flowsheet for vital signs taken during this treatment.   After treatment:   [ ] Patient left in no apparent distress sitting up in chair  [X] Patient left in no apparent distress in bed  [X] Call bell left within reach  [X] Nursing notified  [ ] Caregiver present  [ ] Bed alarm activated      COMMUNICATION/COLLABORATION:   The patients plan of care was discussed with: Physical Therapist and Registered Nurse     Whitney Hernandez OT  Time Calculation: 24 mins

## 2017-02-08 NOTE — PROGRESS NOTES
Bedside and Verbal shift change report given to [employer], RN (oncoming nurse) by Ana Ingram RN (offgoing nurse). Report included the following information SBAR, Kardex, ED Summary, Intake/Output, MAR and Recent Results.

## 2017-02-08 NOTE — DIABETES MGMT
[facility]    Elevated Blood Glucose Note (POCT Glucose >180 mg/dL)    Recommendations/ Comments: Chart reviewed for elevated blood glucose. Radha Hager is a 62 yo AA female with a past medical history significant for DM type 2, per Dr. Geneva Guevara H&P dated 2017. She sees Dr. Colleen Jean-Baptiste MD [facility]) on an outpatient basis. Per patient home medications are  1. Novolog Mix 70/30 - on a sliding scale after meals  For glucose <150 - 60 units; will add 20 units for every 50 points above 150. Fasting glucose today: 329 mg/dL (per am lab). Required 50 units of correction in the last 24 hours. Noted NPH 50 units twice a day to be started this evening. If appropriate, please consider the followin. Novolog Mix 70/30 - 60 units 3 times a day before meals   2. Continuing Insulin Resistant Scale  3. NPH 22 units twice a day - at 0800 and at 2000       Recent Glucose Results:   Lab Results   Component Value Date/Time     (H) 2017 07:00 AM    GLUCPOC 337 (H) 2017 11:20 AM    GLUCPOC 334 (H) 2017 07:30 AM    GLUCPOC 458 (H) 2017 08:48 PM        A1C:   Lab Results   Component Value Date/Time    Hemoglobin A1c 6.6 2017 03:30 AM    Hemoglobin A1c 5.2 2014 04:00 AM     Lab Results   Component Value Date/Time    Creatinine 3.91 2017 07:00 AM     Active Orders   Diet    DIET DIABETIC CONSISTENT CARB Regular; 2 GM NA (House Low NA); AHA-LOW-CHOL FAT        Thank you. Aide Dyson. Deng Degroot, JUAN PABLON, MPH  Diabetes [address] [place] [address]  [phone]    -For most hospitalized persons with hyperglycemia in the intensive care unit (ICU), a glucose range of 140 to 180 mg/dL is recommended, provided this target can be safely achieved. *  - For general medicine and surgery patients in non-ICU settings, a premeal glucose target <140 mg/dL and a random blood glucose <180 mg/dL are recommended. DIEGO Mejia, ASHLEY Mathews Mitra Corral, ... & Lex Rinne ([id number]). AMERICAN ASSOCIATION OF CLINICAL ENDOCRINOLOGISTS AND AMERICAN COLLEGE OF ENDOCRINOLOGY-CLINICAL PRACTICE GUIDELINES FOR DEVELOPING A DIABETES MELLITUS COMPREHENSIVE CARE PLAN-2015-EXECUTIVE SUMMARY: Complete guidelines are available at https://www. aace. com/publications/guidelines. Endocrine Practice, 21(4), C6823944.

## 2017-02-08 NOTE — PROGRESS NOTES
Problem: Mobility Impaired (Adult and Pediatric)  Goal: *Acute Goals and Plan of Care (Insert Text)  Physical Therapy Goals  Initiated 2/3/2017  1. Patient will move from supine to sit and sit to supine in bed with independence within 7 day(s). 2. Patient will transfer from bed to chair and chair to bed with modified independence using the least restrictive device within 7 day(s). 3. Patient will perform sit to stand with independence within 7 day(s). 4. Patient will ambulate with independence for 75 feet with the least restrictive device within 7 day(s). 5. Patient will ascend/descend 2 stairs with 0 handrail(s) with independence within 7 day(s). PHYSICAL THERAPY TREATMENT  Patient: Radha Hager (22 y.o. female)  Date: 2/8/2017  Diagnosis: DVT (deep venous thrombosis) ([facility])  airway survey <principal problem not specified>  Procedure(s) (LRB):  BRONCHOSCOPY (N/A)  BRONCHIAL ALVEOLAR LAVAGE (Right) 5 Days Post-Op  Precautions:        ASSESSMENT:  Patient progressing well with gait and activity tolerance with much improved distances from initial evaluation. Gait training completed on 3L via nasal canula and SpO2 remained >95% throughout. Visible LARA and one standing rest break required but she remained motivated for progress. Recommend discharge home with or without HHPT services. Progression toward goals:  [X]    Improving appropriately and progressing toward goals  [ ]    Improving slowly and progressing toward goals  [ ]    Not making progress toward goals and plan of care will be adjusted       PLAN:  Patient continues to benefit from skilled intervention to address the above impairments. Continue treatment per established plan of care. Discharge Recommendations:  Home Health and None  Further Equipment Recommendations for Discharge:  NOne       SUBJECTIVE:   Patient stated I don't see too much improvement.       OBJECTIVE DATA SUMMARY:   Critical Behavior:  Neurologic State: Alert, Appropriate for age  Orientation Level: Oriented X4  Cognition: Appropriate for age attention/concentration, Follows commands  Safety/Judgement: Awareness of environment, Fall prevention, Good awareness of safety precautions, Insight into deficits  Functional Mobility Training:  Bed Mobility:  Rolling: Independent  Supine to Sit: Modified independent  Sit to Supine: Modified independent  Scooting: Independent        Transfers:  Sit to Stand: Modified independent                                Balance:  Sitting: Intact  Standing: Intact  Standing - Static: Good  Standing - Dynamic : Good  Ambulation/Gait Training:  Distance (ft): 220 Feet (ft)     Ambulation - Level of Assistance: Stand-by asssistance        Gait Abnormalities: Path deviations;Trunk sway increased        Base of Support: Widened     Speed/Nichole: Shuffled  Pain:  Pain Scale 1: Numeric (0 - 10)                 After treatment:   [ ]    Patient left in no apparent distress sitting up in chair  [X]    Patient left in no apparent distress in bed  [X]    Call bell left within reach  [X]    Nursing notified  [ ]    Caregiver present  [ ]    Bed alarm activated      COMMUNICATION/COLLABORATION:   The patients plan of care was discussed with: Registered Nurse     Eloy Coleman PT, DPT   Time Calculation: 24 mins

## 2017-02-08 NOTE — PROGRESS NOTES
2/8/17  [facility] is unable to deliver rollator. Cm faxed referral to [address] who reports that they can deliver to pts room today.  Bud Mae, JOSIE

## 2017-02-08 NOTE — PROGRESS NOTES
[facility] Dosing Services: 2/8/17    Consult for Warfarin Dosing by Pharmacy by Dr. Bill Plata provided for this 63 y.o.  female , for indication of Acute DVT  Day of Therapy 7  Dose to achieve an INR goal of 2-3    Order entered to hold warfarin 2/8/17 due to rapid INR increase    Significant drug interactions: Clopidogrel  Previous dose given 3mg 2/7/17   PT/INR Lab Results   Component Value Date/Time    INR 3.3 02/08/2017 07:00 AM      Platelets Lab Results   Component Value Date/Time    PLATELET 201 89/67/2683 01:37 PM      H/H Lab Results   Component Value Date/Time    HGB 7.8 02/06/2017 01:37 PM        Pharmacy to follow daily and will provide subsequent Warfarin dosing based on clinical status.     Jose A Hitchcock, PharmD, BCPS  Contact information

## 2017-02-08 NOTE — PROGRESS NOTES
Name: Bertram Repress: [id number]   : 1955 Admit Date: 2017   Phone: [phone]  Room: [facility]   PCP: None  MRN: [id number]   Date: 2017  Code: Full Code          Chart and notes reviewed. Data reviewed. I review the patient's current medications in the medical record at each encounter. I have evaluated and examined the patient. Overnight events  Afebrile  Sats 94% on 2L  Na 135  Creat 3.91 - slightly better  INR 3.3  Glucose elevated  Resp cx no growth  Pna work up negative  KUB unremarkable    ROS: Feeling about the same this morning. Continue with nonproductive cough and LARA. Did well with therapy and maintained sats with O2 in place. Continues to wheeze. Still feels constipated and bloated. Denies CP but does have some chest wall soreness with coughing.         Current Facility-Administered Medications   Medication Dose Route Frequency    Warfarin - Hold dose 17   Other ONCE    chlorpheniramine-HYDROcodone (TUSSIONEX) oral suspension 5 mL  5 mL Oral Q12H    methylPREDNISolone (PF) (SOLU-MEDROL) injection 30 mg  30 mg IntraVENous Q8H    insulin NPH (NOVOLIN N, HUMULIN N) injection 30 Units  30 Units SubCUTAneous ACB&D    linaclotide (LINZESS) capsule 290 mcg  290 mcg Oral ACB    nitroglycerin (NITROSTAT) tablet 0.4 mg  0.4 mg SubLINGual Q5MIN PRN    influenza vaccine  (36mos+)(PF) (FLUZONE/FLUARIX/FLULAVAL QUAD) injection 0.5 mL  0.5 mL IntraMUSCular PRIOR TO DISCHARGE    oxyCODONE IR (ROXICODONE) tablet 10 mg  10 mg Oral Q3H PRN    polyethylene glycol (MIRALAX) packet 17 g  17 g Oral DAILY    senna-docusate (PERICOLACE) 8.6-50 mg per tablet 2 Tab  2 Tab Oral DAILY    sodium chloride (NS) flush 5-10 mL  5-10 mL IntraVENous Q8H    sodium chloride (NS) flush 5-10 mL  5-10 mL IntraVENous PRN    naloxone (NARCAN) injection 0.4 mg  0.4 mg IntraVENous PRN    ondansetron (ZOFRAN) injection 4 mg  4 mg IntraVENous Q4H PRN    glucose chewable tablet 16 g  4 Tab Oral PRN    dextrose (D50W) injection syrg 12.5-25 g  12.5-25 g IntraVENous PRN    glucagon (GLUCAGEN) injection 1 mg  1 mg IntraMUSCular PRN    insulin lispro (HUMALOG) injection   SubCUTAneous AC&HS    isosorbide mononitrate ER (IMDUR) tablet 120 mg  120 mg Oral DAILY    carvedilol (COREG) tablet 25 mg  25 mg Oral BID WITH MEALS    clopidogrel (PLAVIX) tablet 75 mg  75 mg Oral DAILY    pantoprazole (PROTONIX) tablet 40 mg  40 mg Oral DAILY    amLODIPine (NORVASC) tablet 10 mg  10 mg Oral DAILY    atorvastatin (LIPITOR) tablet 80 mg  80 mg Oral QPM    Warfarin pharmacy to dose   Other Rx Dosing/Monitoring    albuterol-ipratropium (DUO-NEB) 2.5 MG-0.5 MG/3 ML  3 mL Nebulization Q6H RT    benzonatate (TESSALON) capsule 100 mg  100 mg Oral TID PRN    fluticasone (FLONASE) 50 mcg/actuation nasal spray 2 Spray  2 Spray Both Nostrils QHS    temazepam (RESTORIL) capsule 30 mg  30 mg Oral QHS PRN    sucralfate (CARAFATE) tablet 1 g  1 g Oral AC&HS         REVIEW OF SYSTEMS   Negative except as stated in the HPI. Physical Exam:   Visit Vitals    /76 (BP 1 Location: Left arm, BP Patient Position: At rest)    Pulse 68    Temp 98.8 °F (37.1 °C)    Resp 18    Ht 5' 10\" (1.778 m)    Wt 152 kg (335 lb 1.6 oz)    SpO2 94%    Breastfeeding No    BMI 48.08 kg/m2       General:  Alert, cooperative, no distress, appears stated age. Head:  Normocephalic, without obvious abnormality, atraumatic. Eyes:  Conjunctivae/corneas clear. Nose: Nares normal. Septum midline. Mucosa normal.    Throat: Lips, mucosa, and tongue normal. Class 4 airway. Neck: Thick, supple, symmetrical, trachea midline, no adenopathy. Lungs:   Occasional rhonchi with mild scattered wheezing bilaterally. Chest wall:  No tenderness or deformity. Heart:  Regular rate and rhythm, S1, S2 normal, no murmur, click, rub or gallop. Abdomen:   Obese, soft, generalized TTP.  Bowel sounds normal. No masses,  No organomegaly. Extremities: Extremities normal, atraumatic, no cyanosis, +LE edema, R>L. Pulses: 2+ and symmetric all extremities. Skin: Skin color, texture, turgor normal. No rashes or lesions   Lymph nodes: Cervical, supraclavicular nodes normal.   Neurologic: Grossly nonfocal       Lab Results   Component Value Date/Time    Sodium 135 02/08/2017 07:00 AM    Potassium 4.6 02/08/2017 07:00 AM    Chloride 103 02/08/2017 07:00 AM    CO2 22 02/08/2017 07:00 AM     02/08/2017 07:00 AM    Creatinine 3.91 02/08/2017 07:00 AM    Glucose 329 02/08/2017 07:00 AM    Calcium 8.1 02/08/2017 07:00 AM    Magnesium 1.7 02/07/2017 01:30 AM    Phosphorus 3.4 02/08/2017 07:00 AM    Lactic acid 1.2 02/02/2017 03:20 PM       Lab Results   Component Value Date/Time    WBC 21.1 02/06/2017 01:37 PM    HGB 7.8 02/06/2017 01:37 PM    PLATELET 488 19/33/4342 01:37 PM    MCV 89.5 02/06/2017 01:37 PM       Lab Results   Component Value Date/Time    INR 3.3 02/08/2017 07:00 AM    aPTT 57.4 02/05/2017 11:20 PM    AST (SGOT) 11 02/02/2017 03:20 PM    Alk.  phosphatase 79 02/02/2017 03:20 PM    Protein, total 6.9 02/02/2017 03:20 PM    Albumin 2.7 02/08/2017 07:00 AM    Globulin 4.2 02/02/2017 03:20 PM       Lab Results   Component Value Date/Time    Iron 29 11/09/2014 03:30 AM    TIBC 245 11/09/2014 03:30 AM    Iron % saturation 12 11/09/2014 03:30 AM    Ferritin 255 11/09/2014 04:00 AM       No results found for: SR, CRP, GLENNY, ANAIGG, RA, RPR, RPRT, VDRLT, VDRLS, TSH, TSHEXT, TSHEXT     No results found for: PH, PHI, PCO2, PCO2I, PO2, PO2I, HCO3, HCO3I, FIO2, FIO2I    Lab Results   Component Value Date/Time    CK 72 07/01/2015 07:42 PM    CK-MB Index CANNOT BE CALCULATED 07/01/2015 07:42 PM    Troponin-I, Qt. <0.04 02/03/2017 03:30 AM        Lab Results   Component Value Date/Time    Culture result: HEAVY  NORMAL RESPIRATORY WAGNER   02/03/2017 04:10 PM    Culture result: MODERATE  YEAST   02/03/2017 04:10 PM    Culture result: NO GROWTH 6 DAYS 02/02/2017 03:20 PM       No results found for: TOXA1, RPR, HBCM, HBSAG, HAAB, HCAB, HCAB1, HAAT, G6PD, CRYAC, HIVGT, HIVR, HIV1, HIV12, HIVPC, HIVRPI    Lab Results   Component Value Date/Time    CK 72 07/01/2015 07:42 PM    CK 42 11/09/2014 03:30 AM       Lab Results   Component Value Date/Time    Color YELLOW/STRAW 02/03/2017 05:36 AM    Appearance CLEAR 02/03/2017 05:36 AM    pH (UA) 5.0 02/03/2017 05:36 AM    Protein 100 02/03/2017 05:36 AM    Glucose NEGATIVE  02/03/2017 05:36 AM    Ketone NEGATIVE  02/03/2017 05:36 AM    Bilirubin NEGATIVE  02/03/2017 05:36 AM    Blood NEGATIVE  02/03/2017 05:36 AM    Urobilinogen 0.2 02/03/2017 05:36 AM    Nitrites NEGATIVE  02/03/2017 05:36 AM    Leukocyte Esterase NEGATIVE  02/03/2017 05:36 AM    WBC 0-4 02/03/2017 05:36 AM    RBC 0-5 02/03/2017 05:36 AM    Bacteria NEGATIVE  02/03/2017 05:36 AM       Images: no new images this morning    IMPRESSION  · Acute/chronic hypoxic respiratory failure: ? ILD exacerbation  · RLE DVT; may also have PE, but allergic to contrast and had CKD, so not able to check CTA. Agree that V/Q not likley to be helpful in light of ILD.   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 at [address] [place] [address] in 2010  · JASEN  · Chest pain  · Diastolic CHF  · Pulmonary HTN  · HTN  · Acute/chronic kidney disease stage 4  · History of tobacco use; quit 11/2014    PLAN  · Continue O2 and wean as able to keep sats > 90%  · F/U broch cytology  · Continue scheduled Duonebs  · Switch Solumedrol to prednisone taper  · No additional abx for now given multiple recent abx courses and no growth on bronch  · Continue Tessalon, Tussionex, and Flonase  · Diuresis per Renal; holding loops and ARB for now; resume loops at discharge   · Cardiology eval appreciated  · Coumadin: will need to remain on anticoagulation for at least 3-6 months; holding today due to elevated INR  · CPAP nightly  · Mobilize with PT  · Bowel regimen/emena   · GI prophylaxis: Protonix    Patient is stable from a pulmonary standpoint. We will sign off and arrange for outpatietn follow up with Dr. Huy Cline in 2-4 weeks. Please call with questions.     Lorene High

## 2017-02-08 NOTE — CDMP QUERY
2.   Do you concur with the diagnosis of possible acute PE as rendered by the pulmonologist for this patient who has known acute DVT, being treated with po coumadin  =>  =>Other Explanation of clinical findings  =>Unable to Determine (no explanation of clinical findings)    The medical record reflects the following clinical findings, treatment, and risk factors:    Risk Factors: acute dvt of R tibial vein    Clinical Indicators: 62 y/o female presents with acute L leg dvt. she is being worked up for acute respiratory failure. Pulmonology note of 2/7 \" · may also have PE, but allergic to contrast and had CKD, so not able to check CTA. Agree that V/Q not likley to be helpful in light of ILD. \"    Treatment: Patient was initially treated with heparin drip, now on po coumadin    Please clarify and document your clinical opinion in the progress notes and discharge summary including the definitive and/or presumptive diagnosis, (suspected or probable), related to the above clinical findings. Please include clinical findings supporting your diagnosis. Thanks for your time.     Lissette Lewis RN, BSN  [phone]

## 2017-02-08 NOTE — CONSULTS
Cammie Preston MD, [address] [address]    Date of  Admission: 2/2/2017  2:36 PM         IMPRESSION and RECOMMENDATIONS     1.  CAD:  Now with DVT/PE. As it is > 1 yr s/p SAL placement, I feel it is feasible to stop her plavix at this time. I'd try to continue her home ASA 81mg every day if possible. Continue coreg, statin etc.  No evidence of myocardial ischemia, CHF, or dysrhythmia. I have discussed this plan with the patient. She appears to understand this plan and wishes to proceed ahead. Will see prn.          Problem List  Date Reviewed: 2/8/2017          Codes Class Noted    DVT (deep venous thrombosis) ([facility] ICD-10-CM: I82.409  ICD-9-CM: 453.40  2/2/2017        DM (diabetes mellitus), type 2 with renal complications ([facility] XML-07-UZ: E11.29  ICD-9-CM: 250.40  8/9/2013        Vitamin D deficiency ICD-10-CM: E55.9  ICD-9-CM: 268.9  8/9/2013        Angina, class III ([facility] ICD-10-CM: I20.9  ICD-9-CM: 413.9  8/9/2013        Normocytic anemia ICD-10-CM: D64.9  ICD-9-CM: 285.9  5/26/2013        DJD (degenerative joint disease) of knee ICD-10-CM: M17.9  ICD-9-CM: 715.36  10/30/2012        Chronic diastolic heart failure (HCC) ICD-10-CM: I50.32  ICD-9-CM: 428.32  10/5/2012        CKD (chronic kidney disease) stage 3, GFR 30-59 ml/min (Chronic) ICD-10-CM: N18.3  ICD-9-CM: 585.3  10/5/2012        HTN (hypertension) ICD-10-CM: I10  ICD-9-CM: 401.9  10/1/2012        Morbid obesity ICD-10-CM: E66.01  ICD-9-CM: 278.01  10/1/2012        Esophageal reflux ICD-10-CM: K21.9  ICD-9-CM: 530.81  10/1/2012        Gastroparesis ICD-10-CM: K31.84  ICD-9-CM: 536.3  10/1/2012        Pulmonary HTN ([facility] (Chronic) ICD-10-CM: I27.2  ICD-9-CM: 416.8  3/21/2012        Interstitial lung disease ([facility] (Chronic) ICD-10-CM: J84.9  ICD-9-CM: 139  2/28/2011        CAD (coronary Artery Disease) Native Artery (Chronic) ICD-10-CM: I25.10  ICD-9-CM: 414.01 2/16/2011    Overview Addendum 10/5/2012  7:33 AM by PRASHANT Fuentes     [place] 2004  1v CAD by cath - PCI OM with SAL  Cath 5/2004 - patent stent, no new disease  Lexiscan cardiolite 12/09- normal perfusion, EF 66%  Cath: 3/19/12: PA 55/30 mean 41, wedge 26, RA 24, EDP 35, LVG 55%, LM normal, LAD normal, LCX - OM1 patent stent, OM2 prox 85% long, % with L to R collaterals                JASEN on CPAP (Chronic) ICD-10-CM: C04.77  ICD-9-CM: 327.23  2/16/2011    Overview Signed 3/21/2012  8:04 AM by PRASHANT Fuentes Dr. CPAP                   History of Present Illness:     Aurora Gavin is a 63 y.o. female with the above problem list who was admitted for DVT (deep venous thrombosis) (HCC);airway survey. Pt presented with RLE DVT and SOB. Found to have PE. Started on coumadin. Asked to see regarding continuation of plavix. She denies chest pain/discomfort, orthopnea, paroxysmal noctural dyspnea,palpitations, syncope, or near-syncope.     Current Facility-Administered Medications   Medication Dose Route Frequency    Warfarin - Hold dose 2/8/17   Other ONCE    [START ON 2/9/2017] predniSONE (DELTASONE) tablet 30 mg  30 mg Oral DAILY WITH BREAKFAST    [START ON 2/12/2017] predniSONE (DELTASONE) tablet 20 mg  20 mg Oral DAILY WITH BREAKFAST    [START ON 2/15/2017] predniSONE (DELTASONE) tablet 10 mg  10 mg Oral DAILY WITH BREAKFAST    insulin NPH (NOVOLIN N, HUMULIN N) injection 50 Units  50 Units SubCUTAneous ACB&D    chlorpheniramine-HYDROcodone (TUSSIONEX) oral suspension 5 mL  5 mL Oral Q12H    linaclotide (LINZESS) capsule 290 mcg  290 mcg Oral ACB    nitroglycerin (NITROSTAT) tablet 0.4 mg  0.4 mg SubLINGual Q5MIN PRN    influenza vaccine 2016-17 (36mos+)(PF) (FLUZONE/FLUARIX/FLULAVAL QUAD) injection 0.5 mL  0.5 mL IntraMUSCular PRIOR TO DISCHARGE    oxyCODONE IR (ROXICODONE) tablet 10 mg  10 mg Oral Q3H PRN    polyethylene glycol (MIRALAX) packet 17 g  17 g Oral DAILY    senna-docusate (PERICOLACE) 8.6-50 mg per tablet 2 Tab  2 Tab Oral DAILY    sodium chloride (NS) flush 5-10 mL  5-10 mL IntraVENous Q8H    sodium chloride (NS) flush 5-10 mL  5-10 mL IntraVENous PRN    naloxone (NARCAN) injection 0.4 mg  0.4 mg IntraVENous PRN    ondansetron (ZOFRAN) injection 4 mg  4 mg IntraVENous Q4H PRN    glucose chewable tablet 16 g  4 Tab Oral PRN    dextrose (D50W) injection syrg 12.5-25 g  12.5-25 g IntraVENous PRN    glucagon (GLUCAGEN) injection 1 mg  1 mg IntraMUSCular PRN    insulin lispro (HUMALOG) injection   SubCUTAneous AC&HS    isosorbide mononitrate ER (IMDUR) tablet 120 mg  120 mg Oral DAILY    carvedilol (COREG) tablet 25 mg  25 mg Oral BID WITH MEALS    clopidogrel (PLAVIX) tablet 75 mg  75 mg Oral DAILY    pantoprazole (PROTONIX) tablet 40 mg  40 mg Oral DAILY    amLODIPine (NORVASC) tablet 10 mg  10 mg Oral DAILY    atorvastatin (LIPITOR) tablet 80 mg  80 mg Oral QPM    Warfarin pharmacy to dose   Other Rx Dosing/Monitoring    albuterol-ipratropium (DUO-NEB) 2.5 MG-0.5 MG/3 ML  3 mL Nebulization Q6H RT    benzonatate (TESSALON) capsule 100 mg  100 mg Oral TID PRN    fluticasone (FLONASE) 50 mcg/actuation nasal spray 2 Spray  2 Spray Both Nostrils QHS    temazepam (RESTORIL) capsule 30 mg  30 mg Oral QHS PRN    sucralfate (CARAFATE) tablet 1 g  1 g Oral AC&HS      Allergies   Allergen Reactions    Contrast Dye [Iodine] Anaphylaxis    Levaquin [Levofloxacin] Nausea and Vomiting    Morphine Hives and Itching      Family History   Problem Relation Age of Onset    Heart Disease Mother     Heart Disease Brother       Social History     Social History    Marital status:      Spouse name: N/A    Number of children: N/A    Years of education: N/A     Occupational History    Not on file.      Social History Main Topics    Smoking status: Former Smoker     Packs/day: 0.50     Years: 41.00     Types: Cigarettes     Quit date: 11/9/2014    Smokeless tobacco: Never Used    Alcohol use No    Drug use: No    Sexual activity: Not on file     Other Topics Concern    Not on file     Social History Narrative       Physical Exam:     Patient Vitals for the past 16 hrs:   BP Temp Pulse Resp SpO2   02/08/17 1323 142/69 98.6 °F (37 °C) 67 18 98 %   02/08/17 0929 166/76 98.8 °F (37.1 °C) 68 18 94 %   02/08/17 0850 - - - - 98 %   02/08/17 0605 142/84 97.2 °F (36.2 °C) 64 18 98 %   02/07/17 2157 172/74 98 °F (36.7 °C) 78 18 98 %       HEENT Exam:     Normocephalic, atraumatic. EOMI. Oropharynx negative. Neck supple. No lymphadenopathy. Lung Exam:     The patient is not dyspneic. There is occasional cough. Breath sounds are heard equally in all lung fields. There are no rales, rhonchi, or rubs heard on auscultation. Diffuse wheezes. Heart Exam:     The rhythm is regular. The PMI is in the 5th intercostal space of the MCL. Apical impulse is normal. S1 is regular. S2 is physiologic. There is no S3, S4 gallop, murmur, click, or rub. Abdomen Exam:     Bowel sounds are normoactive. Abdomen soft in all quadrants. No tenderness. No palpable masses. No organomegaly. No hernias noted. No bruits or pulsatile mass. Extremities Exam:     The extremities are atraumatic appearing. There is no clubbing, cyanosis, ulcers, varicose veins, rash, erythemia noted in the extremities. The neurovascular status is grossly intact with normal distal sensation and pulses. Mild RLE edema. Vascular Exam:     The radial, brachial, dorsalis pedis, posterior tibial, are equal and strong bilaterally The carotids are equal bilaterally without bruits.           Labs:     Lab Results   Component Value Date/Time    Glucose 329 02/08/2017 07:00 AM    Sodium 135 02/08/2017 07:00 AM    Potassium 4.6 02/08/2017 07:00 AM    Chloride 103 02/08/2017 07:00 AM    CO2 22 02/08/2017 07:00 AM     02/08/2017 07:00 AM    Creatinine 3.91 02/08/2017 07:00 AM Calcium 8.1 02/08/2017 07:00 AM     Recent Labs      02/06/17   1337   WBC  21.1*   HGB  7.8*   HCT  23.1*   PLT  316     Recent Labs      02/08/17   0700  02/07/17   0130   ALB  2.7*  2.7*     Recent Labs      02/08/17   0700  02/07/17   0130  02/05/17   2320   02/05/17   1630   INR  3.3*  2.6*  2.0*   < >   --    PTP  34.6*  27.4*  20.2*   < >   --    APTT   --    --   57.4*   --   65.1*    < > = values in this interval not displayed. No results for input(s): CPK, CKMB, TNIPOC in the last 72 hours. No lab exists for component: TROPONINI, ITNL  No results for input(s): TROIQ in the last 72 hours. Lab Results   Component Value Date/Time    Cholesterol, total 176 11/09/2014 03:30 AM    HDL Cholesterol 64 11/09/2014 03:30 AM    LDL, calculated 92.6 11/09/2014 03:30 AM    Triglyceride 97 11/09/2014 03:30 AM    CHOL/HDL Ratio 2.8 11/09/2014 03:30 AM       EKG:  NSR @ 80, PVC. Echo  SUMMARY:  Left ventricle: Systolic function was vigorous. Ejection fraction was  estimated to be 75 %. There were no regional wall motion abnormalities. Doppler parameters were consistent with abnormal left ventricular  relaxation (grade 1 diastolic dysfunction).

## 2017-02-08 NOTE — PROGRESS NOTES
1605: Notified MD Purcell that pt's . MD stated to give 15u. Also notified MD that enema was given at 1300 & pt has drank 2 cups of prune juice w/out success. MD stated to order Lactulose 30g Q2h until BM.  1930: Bedside and Verbal shift change report given to [address] [place] [address] (oncoming nurse) by Rajni EVANS (offgoing nurse). Report included the following information SBAR, Kardex, Intake/Output and Recent Results.

## 2017-02-08 NOTE — PROGRESS NOTES
Nutrition Assessment:    RECOMMENDATIONS/INTERVENTION(S):   Continue current diet - add 2200 kcal Diabetic  Start Beneprotein TID d/t increased needs for protein  Monitor po intake, wt, BG, skin integrity    ASSESSMENT:   2/8: 63 yof admitted for DVT. Pmhx includes DM, gastroparesis, CKD stage 3, HTM, pulm HTN, chronic diastolic heart failure. Visited pt this afternoon. Reports of fair appetite. States usually eats 1-2 meals per day, more snacks when insulin needed. Observed food from outside facility on bedside tray. Provided diabetic carb consistent diet education. Unintended wt gain of 80# in 1 yr. Constipation, on bowel regimen. Labs & meds reveiwed. , 334, 458, 376 - on steroid. SUBJECTIVE/OBJECTIVE:     Diet Order: Consistent carb, Cardiac  % Eaten:  Patient Vitals for the past 72 hrs:   % Diet Eaten   02/08/17 1205 100 %   02/08/17 0831 75 %   02/07/17 1733 100 %   02/07/17 1322 100 %   02/07/17 0756 50 %   02/06/17 1758 100 %   02/06/17 0841 95 %     Pertinent Medications: [x] Reviewed    Chemistries:  Lab Results   Component Value Date/Time    Sodium 135 02/08/2017 07:00 AM    Potassium 4.6 02/08/2017 07:00 AM    Chloride 103 02/08/2017 07:00 AM    CO2 22 02/08/2017 07:00 AM    Anion gap 10 02/08/2017 07:00 AM    Glucose 329 02/08/2017 07:00 AM     02/08/2017 07:00 AM    Creatinine 3.91 02/08/2017 07:00 AM    BUN/Creatinine ratio 26 02/08/2017 07:00 AM    GFR est AA 14 02/08/2017 07:00 AM    GFR est non-AA 12 02/08/2017 07:00 AM    Calcium 8.1 02/08/2017 07:00 AM    AST (SGOT) 11 02/02/2017 03:20 PM    Alk.  phosphatase 79 02/02/2017 03:20 PM    Protein, total 6.9 02/02/2017 03:20 PM    Albumin 2.7 02/08/2017 07:00 AM    Globulin 4.2 02/02/2017 03:20 PM    A-G Ratio 0.6 02/02/2017 03:20 PM    ALT (SGPT) 17 02/02/2017 03:20 PM      Anthropometrics: Height: 5' 10\" (177.8 cm) Weight: 152 kg (335 lb 1.6 oz)    IBW (%IBW):   ( ) UBW (%UBW): 148.8 kg (328 lb) (102.16 %)    BMI: Body mass index is 48.08 kg/(m^2). This BMI is indicative of:   [] Underweight    [] Normal    [] Overweight    []  Obesity    [x]  Extreme Obesity (BMI>40)  Estimated Nutrition Needs (Based on): 2327 Kcals/day (MSJ (2175 x 1.3) - 500) , 136 g (-167 (0.9-1.1 g/kg actual body wt)) Protein  Carbohydrate: At Least 130 g/day  Fluids: 2300 mL/day    Last BM: 2/4   [x]Active     []Hyperactive  []Hypoactive       [] Absent   BS  Skin:    [] Intact   [] Incision  [] Breakdown   [] DTI   [] Tears/Excoriation/Abrasion  [x]Edema [] Other:    Wt Readings from Last 30 Encounters:   02/08/17 152 kg (335 lb 1.6 oz)   01/25/17 148.8 kg (328 lb)   10/14/16 157.5 kg (347 lb 3.2 oz)   09/13/16 154.6 kg (340 lb 12.8 oz)   09/08/16 158.3 kg (349 lb)   08/25/16 149.7 kg (330 lb)   06/24/16 151.5 kg (334 lb)   06/04/16 158.8 kg (350 lb)   03/21/16 158.3 kg (349 lb)   07/01/15 146 kg (321 lb 14 oz)   06/25/15 151 kg (333 lb)   04/24/15 152.4 kg (336 lb)   01/08/15 143.3 kg (316 lb)   11/17/14 147 kg (324 lb)   10/01/14 130.6 kg (288 lb)   05/29/14 128.8 kg (284 lb)   05/23/14 130.6 kg (288 lb)   03/18/14 127 kg (280 lb)   02/18/14 128.8 kg (284 lb)   11/18/13 130.8 kg (288 lb 6.4 oz)   10/15/13 130.1 kg (286 lb 14.4 oz)   10/05/13 123.8 kg (273 lb)   08/09/13 126.6 kg (279 lb)   07/24/13 131.1 kg (289 lb)   07/21/13 131.5 kg (290 lb)   07/17/13 133.9 kg (295 lb 1.6 oz)   05/24/13 125.8 kg (277 lb 6.4 oz)   12/04/12 132.9 kg (293 lb)   10/30/12 132.5 kg (292 lb)   10/05/12 139.2 kg (306 lb 12.8 oz)      NUTRITION DIAGNOSES:   Problem:      Altered GI function  Etiology: related to     alteration in GI tract function - constipation; hx of gastroparesis  Signs/Symptoms: as evidenced by    reports of decreased po d/t abd pain  Acute nutrition diagnosis #2: Increased need for protein related to impaired skin integrity as evidenced by 1+ pitting BLE edema    NUTRITION INTERVENTIONS:  Meals/Snacks: General/healthful diet                  GOAL:   Pt will tolerate and consume >75% of recommended diet in the next 3-5 days    Cultural, Yazdanism, or Ethnic Dietary Needs: None     LEARNING NEEDS (Diet, Food/Nutrient-Drug Interaction):    [x] None Identified   [] Identified and Education Provided/Documented   [] Identified and Pt declined/was not appropriate      [] Interdisciplinary Care Plan Reviewed/Documented    [x] Discharge Needs:   tbd   [] No Nutrition Related Discharge Needs    NUTRITION RISK:   Pt Is At Nutrition Risk  [x]     No Nutrition Risk Identified  []       PT SEEN FOR:    []  MD Consult: []Calorie Count      []Diabetic Diet Education        []Diet Education     []Electrolyte Management     []General Nutrition Management and Supplements     []Management of Tube Feeding     []TPN Recommendations    []  RN Referral:  []MST score >=2     []Enteral/Parenteral Nutrition PTA     []Pregnant: Gestational DM or Multigestation                 [] Pressure Ulcer  []  Low BMI  [x]  Length of Stay; Dysphagia Diet          Clearance Philippe Rowe [address] [place] [address]   Pager [phone]

## 2017-02-08 NOTE — PROGRESS NOTES
[address] [place] [address]  [address]  [phone]      Medical Progress Note      NAME: Radha Hager   :  1955  MRM:  [id number]    Date/Time: 2017           Subjective:     Chief Complaint:  I still haven't pooped    No acute events. Still no bowel movement. She still has a cough but feels her sob is improved. Objective:       Vitals:          Last 24hrs VS reviewed since prior progress note.  Most recent are:    Visit Vitals    /76 (BP 1 Location: Left arm, BP Patient Position: At rest)    Pulse 68    Temp 98.8 °F (37.1 °C)    Resp 18    Ht 5' 10\" (1.778 m)    Wt 152 kg (335 lb 1.6 oz)    SpO2 94%    Breastfeeding No    BMI 48.08 kg/m2     SpO2 Readings from Last 6 Encounters:   17 94%   17 98%   10/14/16 98%   16 94%   16 97%   16 100%    O2 Flow Rate (L/min): 2 l/min       Intake/Output Summary (Last 24 hours) at 17 1256  Last data filed at 17 1205   Gross per 24 hour   Intake             1440 ml   Output                0 ml   Net             1440 ml          Exam:     Physical Exam:    Gen:  obese, in no acute distress  HEENT:  Pink conjunctivae, PERRL, hearing intact to voice, moist mucous membranes  Neck:  Supple, without masses, thyroid non-tender  Resp:  No accessory muscle use, mild rhonchi bilateral  Card:  No murmurs, normal S1, S2 without thrills, bruits or peripheral edema  Abd:  Soft, non-tender, non-distended, normoactive bowel sounds are present  Musc:  No cyanosis or clubbing  Skin:  No rashes or ulcers, skin turgor is good  Neuro:  No focal deficits, follows commands appropriately  Psych:  Good insight, oriented to person, place and time, alert      Medications Reviewed: (see below)    Lab Data Reviewed: (see below)    ______________________________________________________________________    Medications:     Current Facility-Administered Medications   Medication Dose Route Frequency    Warfarin - Hold dose 2/8/17   Other ONCE    [START ON 2/9/2017] predniSONE (DELTASONE) tablet 30 mg  30 mg Oral DAILY WITH BREAKFAST    [START ON 2/12/2017] predniSONE (DELTASONE) tablet 20 mg  20 mg Oral DAILY WITH BREAKFAST    [START ON 2/15/2017] predniSONE (DELTASONE) tablet 10 mg  10 mg Oral DAILY WITH BREAKFAST    mineral oil (FLEET) enema 133 mL  1 Enema Rectal NOW    chlorpheniramine-HYDROcodone (TUSSIONEX) oral suspension 5 mL  5 mL Oral Q12H    methylPREDNISolone (PF) (SOLU-MEDROL) injection 30 mg  30 mg IntraVENous Q8H    insulin NPH (NOVOLIN N, HUMULIN N) injection 30 Units  30 Units SubCUTAneous ACB&D    linaclotide (LINZESS) capsule 290 mcg  290 mcg Oral ACB    nitroglycerin (NITROSTAT) tablet 0.4 mg  0.4 mg SubLINGual Q5MIN PRN    influenza vaccine 2016-17 (36mos+)(PF) (FLUZONE/FLUARIX/FLULAVAL QUAD) injection 0.5 mL  0.5 mL IntraMUSCular PRIOR TO DISCHARGE    oxyCODONE IR (ROXICODONE) tablet 10 mg  10 mg Oral Q3H PRN    polyethylene glycol (MIRALAX) packet 17 g  17 g Oral DAILY    senna-docusate (PERICOLACE) 8.6-50 mg per tablet 2 Tab  2 Tab Oral DAILY    sodium chloride (NS) flush 5-10 mL  5-10 mL IntraVENous Q8H    sodium chloride (NS) flush 5-10 mL  5-10 mL IntraVENous PRN    naloxone (NARCAN) injection 0.4 mg  0.4 mg IntraVENous PRN    ondansetron (ZOFRAN) injection 4 mg  4 mg IntraVENous Q4H PRN    glucose chewable tablet 16 g  4 Tab Oral PRN    dextrose (D50W) injection syrg 12.5-25 g  12.5-25 g IntraVENous PRN    glucagon (GLUCAGEN) injection 1 mg  1 mg IntraMUSCular PRN    insulin lispro (HUMALOG) injection   SubCUTAneous AC&HS    isosorbide mononitrate ER (IMDUR) tablet 120 mg  120 mg Oral DAILY    carvedilol (COREG) tablet 25 mg  25 mg Oral BID WITH MEALS    clopidogrel (PLAVIX) tablet 75 mg  75 mg Oral DAILY    pantoprazole (PROTONIX) tablet 40 mg  40 mg Oral DAILY    amLODIPine (NORVASC) tablet 10 mg  10 mg Oral DAILY    atorvastatin (LIPITOR) tablet 80 mg  80 mg Oral QPM    Warfarin pharmacy to dose   Other Rx Dosing/Monitoring    albuterol-ipratropium (DUO-NEB) 2.5 MG-0.5 MG/3 ML  3 mL Nebulization Q6H RT    benzonatate (TESSALON) capsule 100 mg  100 mg Oral TID PRN    fluticasone (FLONASE) 50 mcg/actuation nasal spray 2 Spray  2 Spray Both Nostrils QHS    temazepam (RESTORIL) capsule 30 mg  30 mg Oral QHS PRN    sucralfate (CARAFATE) tablet 1 g  1 g Oral AC&HS            Lab Review:     Recent Labs      02/06/17   1337   WBC  21.1*   HGB  7.8*   HCT  23.1*   PLT  316     Recent Labs      02/08/17   0700  02/07/17   0130  02/06/17   1337  02/05/17   2320   NA  135*  133*  135*   --    K  4.6  4.2  3.8   --    CL  103  101  101   --    CO2  22  20*  21   --    GLU  329*  419*  340*   --    BUN  102*  94*  91*   --    CREA  3.91*  4.10*  3.92*   --    CA  8.1*  7.6*  7.4*   --    MG  1.8  1.7  1.5*   --    PHOS  3.4  3.7  4.0   --    ALB  2.7*  2.7*  2.6*   --    INR  3.3*  2.6*   --   2.0*     No components found for: [place]         Assessment / Plan:     Acute on chronic hypoxemic respiratory failure / Interstitial lung disease: unclear precipitant. Underwent bronch on 2/3, found to have mild mucoid secretions noted bilaterally consistent with mild bilateral pneumonia. S/p tx with multiple rounds of abx, further abx tx on hold. Continue IV steroids (currently weaning) and nebs.       Acute DVT (deep venous thrombosis): continue coumadin, INR therapeutic    Chronic diastolic heart failure / Pulmonary HTN / CAD (coronary Artery Disease) Native Artery: in setting of worsening SOB. Continue statin, coreg, imdur. ARB and bumex on hold  --pt wants to discuss stopping plavix as she was started on coumadin for DVT      HTN (hypertension): cont Norvasc, Coreg, ISMN      Morbid obesity:  Counseled on weight loss      ROSEANN on CKD (chronic kidney disease) stage 4: nephrology following. Cr stable today.  holding ARB and diuretic      Anemia: likely ACD from CKD     DM (diabetes mellitus), type 2 with renal complications: poorly controlled on steroids. Worsened with steroids. Labile BG, A1c 6.6%. Increase NPH today    Constipation: no BM despite changes made yesterday. Cont linzess.  Pt did not get enema yesterday, will discuss with nursing    Total time spent with patient: 30 [address] discussed with: Patient, Family and Consultant/Specialist    Discussed:  Care Plan    Prophylaxis:  Coumadin    Disposition:  Home w/Family           ___________________________________________________    Attending Physician: Monico Snell MD

## 2017-02-09 LAB
ALBUMIN SERPL BCP-MCNC: 2.9 G/DL (ref 3.5–5)
ANION GAP BLD CALC-SCNC: 13 MMOL/L (ref 5–15)
BUN SERPL-MCNC: 93 MG/DL (ref 6–20)
BUN/CREAT SERPL: 23 (ref 12–20)
CALCIUM SERPL-MCNC: 8.4 MG/DL (ref 8.5–10.1)
CHLORIDE SERPL-SCNC: 103 MMOL/L (ref 97–108)
CO2 SERPL-SCNC: 20 MMOL/L (ref 21–32)
CREAT SERPL-MCNC: 4.06 MG/DL (ref 0.55–1.02)
GLUCOSE BLD STRIP.AUTO-MCNC: 160 MG/DL (ref 65–100)
GLUCOSE BLD STRIP.AUTO-MCNC: 188 MG/DL (ref 65–100)
GLUCOSE BLD STRIP.AUTO-MCNC: 235 MG/DL (ref 65–100)
GLUCOSE BLD STRIP.AUTO-MCNC: 237 MG/DL (ref 65–100)
GLUCOSE SERPL-MCNC: 267 MG/DL (ref 65–100)
INR PPP: 3.7 (ref 0.9–1.1)
PHOSPHATE SERPL-MCNC: 3.7 MG/DL (ref 2.6–4.7)
POTASSIUM SERPL-SCNC: 3.9 MMOL/L (ref 3.5–5.1)
PROTHROMBIN TIME: 39.3 SEC (ref 9–11.1)
SERVICE CMNT-IMP: ABNORMAL
SODIUM SERPL-SCNC: 136 MMOL/L (ref 136–145)

## 2017-02-09 PROCEDURE — 74011250637 HC RX REV CODE- 250/637: Performed by: INTERNAL MEDICINE

## 2017-02-09 PROCEDURE — 74011636637 HC RX REV CODE- 636/637: Performed by: PHYSICIAN ASSISTANT

## 2017-02-09 PROCEDURE — 74011636637 HC RX REV CODE- 636/637: Performed by: INTERNAL MEDICINE

## 2017-02-09 PROCEDURE — 36415 COLL VENOUS BLD VENIPUNCTURE: CPT | Performed by: INTERNAL MEDICINE

## 2017-02-09 PROCEDURE — 85610 PROTHROMBIN TIME: CPT | Performed by: INTERNAL MEDICINE

## 2017-02-09 PROCEDURE — 82962 GLUCOSE BLOOD TEST: CPT

## 2017-02-09 PROCEDURE — 74011250637 HC RX REV CODE- 250/637: Performed by: PHYSICIAN ASSISTANT

## 2017-02-09 PROCEDURE — 80069 RENAL FUNCTION PANEL: CPT | Performed by: INTERNAL MEDICINE

## 2017-02-09 PROCEDURE — 94640 AIRWAY INHALATION TREATMENT: CPT

## 2017-02-09 PROCEDURE — 74011000250 HC RX REV CODE- 250: Performed by: INTERNAL MEDICINE

## 2017-02-09 PROCEDURE — 74011250636 HC RX REV CODE- 250/636: Performed by: INTERNAL MEDICINE

## 2017-02-09 PROCEDURE — 74011250637 HC RX REV CODE- 250/637: Performed by: NURSE PRACTITIONER

## 2017-02-09 PROCEDURE — 65270000029 HC RM PRIVATE

## 2017-02-09 PROCEDURE — 94660 CPAP INITIATION&MGMT: CPT

## 2017-02-09 RX ORDER — WARFARIN 4 MG/1
4 TABLET ORAL DAILY
Qty: 15 TAB | Refills: 0 | Status: SHIPPED | OUTPATIENT
Start: 2017-02-09 | End: 2017-02-20

## 2017-02-09 RX ORDER — POLYETHYLENE GLYCOL 3350 17 G/17G
17 POWDER, FOR SOLUTION ORAL DAILY
Qty: 14 PACKET | Refills: 0 | Status: SHIPPED | OUTPATIENT
Start: 2017-02-09 | End: 2017-02-28 | Stop reason: CLARIF

## 2017-02-09 RX ORDER — HYDROCODONE POLISTIREX AND CHLORPHENIRAMINE POLISTIREX 10; 8 MG/5ML; MG/5ML
5 SUSPENSION, EXTENDED RELEASE ORAL EVERY 12 HOURS
Qty: 115 ML | Refills: 0 | Status: SHIPPED | OUTPATIENT
Start: 2017-02-09 | End: 2017-02-16

## 2017-02-09 RX ORDER — PREDNISONE 10 MG/1
TABLET ORAL
Qty: 18 TAB | Refills: 0 | Status: SHIPPED | OUTPATIENT
Start: 2017-02-09 | End: 2017-02-21 | Stop reason: ALTCHOICE

## 2017-02-09 RX ORDER — AMOXICILLIN 250 MG
2 CAPSULE ORAL DAILY
Qty: 28 TAB | Refills: 0 | Status: SHIPPED | OUTPATIENT
Start: 2017-02-09 | End: 2017-02-28

## 2017-02-09 RX ORDER — BUMETANIDE 1 MG/1
1 TABLET ORAL 2 TIMES DAILY
Qty: 60 TAB | Refills: 0 | Status: SHIPPED | OUTPATIENT
Start: 2017-02-09 | End: 2017-02-20

## 2017-02-09 RX ADMIN — PREDNISONE 30 MG: 5 TABLET ORAL at 08:55

## 2017-02-09 RX ADMIN — ATORVASTATIN CALCIUM 80 MG: 20 TABLET, FILM COATED ORAL at 18:15

## 2017-02-09 RX ADMIN — SUCRALFATE 1 G: 1 TABLET ORAL at 22:42

## 2017-02-09 RX ADMIN — Medication 10 ML: at 05:55

## 2017-02-09 RX ADMIN — HUMAN INSULIN 65 UNITS: 100 INJECTION, SUSPENSION SUBCUTANEOUS at 08:55

## 2017-02-09 RX ADMIN — IPRATROPIUM BROMIDE AND ALBUTEROL SULFATE 3 ML: .5; 2.5 SOLUTION RESPIRATORY (INHALATION) at 08:08

## 2017-02-09 RX ADMIN — CARVEDILOL 25 MG: 12.5 TABLET, FILM COATED ORAL at 08:55

## 2017-02-09 RX ADMIN — IPRATROPIUM BROMIDE AND ALBUTEROL SULFATE 3 ML: .5; 2.5 SOLUTION RESPIRATORY (INHALATION) at 02:42

## 2017-02-09 RX ADMIN — SUCRALFATE 1 G: 1 TABLET ORAL at 13:04

## 2017-02-09 RX ADMIN — HUMAN INSULIN 65 UNITS: 100 INJECTION, SUSPENSION SUBCUTANEOUS at 18:16

## 2017-02-09 RX ADMIN — IPRATROPIUM BROMIDE AND ALBUTEROL SULFATE 3 ML: .5; 2.5 SOLUTION RESPIRATORY (INHALATION) at 13:45

## 2017-02-09 RX ADMIN — HYDROCODONE POLISTIREX AND CHLORPHENIRAMINE POLISTIREX 5 ML: 10; 8 SUSPENSION, EXTENDED RELEASE ORAL at 20:48

## 2017-02-09 RX ADMIN — PANTOPRAZOLE SODIUM 40 MG: 40 TABLET, DELAYED RELEASE ORAL at 08:54

## 2017-02-09 RX ADMIN — POLYETHYLENE GLYCOL 3350 17 G: 17 POWDER, FOR SOLUTION ORAL at 08:56

## 2017-02-09 RX ADMIN — LINACLOTIDE 290 MCG: 145 CAPSULE, GELATIN COATED ORAL at 08:54

## 2017-02-09 RX ADMIN — OXYCODONE HYDROCHLORIDE 10 MG: 5 TABLET ORAL at 22:42

## 2017-02-09 RX ADMIN — HYDROCODONE POLISTIREX AND CHLORPHENIRAMINE POLISTIREX 5 ML: 10; 8 SUSPENSION, EXTENDED RELEASE ORAL at 09:00

## 2017-02-09 RX ADMIN — OXYCODONE HYDROCHLORIDE 10 MG: 5 TABLET ORAL at 19:13

## 2017-02-09 RX ADMIN — CLOPIDOGREL 75 MG: 75 TABLET, FILM COATED ORAL at 08:54

## 2017-02-09 RX ADMIN — INSULIN LISPRO 4 UNITS: 100 INJECTION, SOLUTION INTRAVENOUS; SUBCUTANEOUS at 08:56

## 2017-02-09 RX ADMIN — IPRATROPIUM BROMIDE AND ALBUTEROL SULFATE 3 ML: .5; 2.5 SOLUTION RESPIRATORY (INHALATION) at 20:11

## 2017-02-09 RX ADMIN — ISOSORBIDE MONONITRATE 120 MG: 30 TABLET ORAL at 08:54

## 2017-02-09 RX ADMIN — Medication 10 ML: at 19:14

## 2017-02-09 RX ADMIN — LACTULOSE 30 G: 10 SOLUTION ORAL at 01:42

## 2017-02-09 RX ADMIN — Medication 10 ML: at 22:44

## 2017-02-09 RX ADMIN — AMLODIPINE BESYLATE 10 MG: 5 TABLET ORAL at 08:54

## 2017-02-09 RX ADMIN — SUCRALFATE 1 G: 1 TABLET ORAL at 08:54

## 2017-02-09 RX ADMIN — INSULIN LISPRO 3 UNITS: 100 INJECTION, SOLUTION INTRAVENOUS; SUBCUTANEOUS at 18:16

## 2017-02-09 RX ADMIN — SUCRALFATE 1 G: 1 TABLET ORAL at 18:15

## 2017-02-09 RX ADMIN — ONDANSETRON 4 MG: 2 INJECTION INTRAMUSCULAR; INTRAVENOUS at 19:13

## 2017-02-09 RX ADMIN — LACTULOSE 30 G: 10 SOLUTION ORAL at 08:55

## 2017-02-09 RX ADMIN — Medication 2 TABLET: at 08:54

## 2017-02-09 RX ADMIN — INSULIN LISPRO 4 UNITS: 100 INJECTION, SOLUTION INTRAVENOUS; SUBCUTANEOUS at 13:04

## 2017-02-09 RX ADMIN — CARVEDILOL 25 MG: 12.5 TABLET, FILM COATED ORAL at 18:15

## 2017-02-09 RX ADMIN — LACTULOSE 30 G: 10 SOLUTION ORAL at 00:04

## 2017-02-09 NOTE — PROGRESS NOTES
[facility] Pharmacy Dosing Services: 2/9/17    Consult for Warfarin Dosing by Pharmacy by Dr. Devon Ramirez provided for this 63 y.o.  female , for indication of Acute DVT  Day of Therapy 8  Dose to achieve an INR goal of 2-3    Order entered to hold warfarin 2/9/17  Significant drug interactions: Clopidogrel  Previous dose given Held 2/8/17   PT/INR Lab Results   Component Value Date/Time    INR 3.7 02/09/2017 03:35 AM      Platelets Lab Results   Component Value Date/Time    PLATELET 154 46/64/7808 01:37 PM      H/H Lab Results   Component Value Date/Time    HGB 7.8 02/06/2017 01:37 PM        Pharmacy to follow daily and will provide subsequent Warfarin dosing based on clinical status.     Kaveh Garcia, PharmD, BCPS  Contact information

## 2017-02-09 NOTE — PROGRESS NOTES
[address] [facility]  YOB: 1955          Assessment & Plan:   1. ROSEANN on CKD 4  · Cr stabilizing at new level  · ROSEANN Due to IVVF ( Poor PO, 2 Diuretics) and/or Bactrim: Pre renal to ATN  · Off ARB and diuretics  · She is on Steroids so role of biopsy to diagnose AIN is mute:dw pt  · Resume  Loops  · BUN is up due to Steroids and IVVD, Also ROSEANN/CKD  2. CKD 4  · Cr 2.7-2/8 mg% at baseline, , Diabetic and Hypertensive Nephrosclerosis  · Hold ARB  3. DM  · Insulin  4. HTN  · BB,CCB,Thiazide,  · ARB,Loops on Hold  5. Resp Failure  · I think this is due to Exacerbation of ILD and ? PE  · I do not think this is volume  6. Obesity  · Wt loss will help  7. Smoker  · None now  8. Chronic Diastolic CHF  ·   · Stable off Loops       Subjective:   CC:ARF  HPI: Patient seen   ROSEANN is stabilizing now  She is off ARB  SOB    No BM for 2 weeks, constipation.   Edema is same  ROS:as above otherwise neg for 12 sys  Current Facility-Administered Medications   Medication Dose Route Frequency    insulin NPH (NOVOLIN N, HUMULIN N) injection 65 Units  65 Units SubCUTAneous ACB&D    Warfarin - Hold dose 2/9/17   Other ONCE    predniSONE (DELTASONE) tablet 30 mg  30 mg Oral DAILY WITH BREAKFAST    [START ON 2/12/2017] predniSONE (DELTASONE) tablet 20 mg  20 mg Oral DAILY WITH BREAKFAST    [START ON 2/15/2017] predniSONE (DELTASONE) tablet 10 mg  10 mg Oral DAILY WITH BREAKFAST    chlorpheniramine-HYDROcodone (TUSSIONEX) oral suspension 5 mL  5 mL Oral Q12H    linaclotide (LINZESS) capsule 290 mcg  290 mcg Oral ACB    nitroglycerin (NITROSTAT) tablet 0.4 mg  0.4 mg SubLINGual Q5MIN PRN    influenza vaccine 2016-17 (36mos+)(PF) (FLUZONE/FLUARIX/FLULAVAL QUAD) injection 0.5 mL  0.5 mL IntraMUSCular PRIOR TO DISCHARGE    oxyCODONE IR (ROXICODONE) tablet 10 mg  10 mg Oral Q3H PRN    polyethylene glycol (MIRALAX) packet 17 g  17 g Oral DAILY    senna-docusate (PERICOLACE) 8.6-50 mg per tablet 2 Tab  2 Tab Oral DAILY    sodium chloride (NS) flush 5-10 mL  5-10 mL IntraVENous Q8H    sodium chloride (NS) flush 5-10 mL  5-10 mL IntraVENous PRN    naloxone (NARCAN) injection 0.4 mg  0.4 mg IntraVENous PRN    ondansetron (ZOFRAN) injection 4 mg  4 mg IntraVENous Q4H PRN    glucose chewable tablet 16 g  4 Tab Oral PRN    dextrose (D50W) injection syrg 12.5-25 g  12.5-25 g IntraVENous PRN    glucagon (GLUCAGEN) injection 1 mg  1 mg IntraMUSCular PRN    insulin lispro (HUMALOG) injection   SubCUTAneous AC&HS    isosorbide mononitrate ER (IMDUR) tablet 120 mg  120 mg Oral DAILY    carvedilol (COREG) tablet 25 mg  25 mg Oral BID WITH MEALS    clopidogrel (PLAVIX) tablet 75 mg  75 mg Oral DAILY    pantoprazole (PROTONIX) tablet 40 mg  40 mg Oral DAILY    amLODIPine (NORVASC) tablet 10 mg  10 mg Oral DAILY    atorvastatin (LIPITOR) tablet 80 mg  80 mg Oral QPM    Warfarin pharmacy to dose   Other Rx Dosing/Monitoring    albuterol-ipratropium (DUO-NEB) 2.5 MG-0.5 MG/3 ML  3 mL Nebulization Q6H RT    benzonatate (TESSALON) capsule 100 mg  100 mg Oral TID PRN    fluticasone (FLONASE) 50 mcg/actuation nasal spray 2 Spray  2 Spray Both Nostrils QHS    temazepam (RESTORIL) capsule 30 mg  30 mg Oral QHS PRN    sucralfate (CARAFATE) tablet 1 g  1 g Oral AC&HS          Objective:     Vitals:  Blood pressure 154/72, pulse 72, temperature 98.5 °F (36.9 °C), resp. rate 18, height 5' 10\" (1.778 m), weight (!) 160.6 kg (354 lb 0.9 oz), SpO2 95 %, not currently breastfeeding.   Temp (24hrs), Av.4 °F (36.9 °C), Min:97.9 °F (36.6 °C), Max:98.7 °F (37.1 °C)      Intake and Output:      1901 -  0700  In: 960 [P.O.:960]  Out: -     Physical Exam:                Patient is intubated:  no    Physical Examination:   GENERAL ASSESSMENT: NAD  HEENT:Nontraumatic   CHEST: Crackles  HEART: S1S2  ABDOMEN: Soft,NT,  :Myers: n  EXTREMITY: EDEMA 1  NEURO:Grossly non focal          ECG/rhythm:    Data Review      No results for input(s): TNIPOC in the last 72 hours. No lab exists for component: ITNL   No results for input(s): CPK, CKMB, TROIQ in the last 72 hours. Recent Labs      02/09/17 0335 02/08/17   0700  02/07/17   0130  02/06/17   1337   NA  136  135*  133*  135*   K  3.9  4.6  4.2  3.8   CL  103  103  101  101   CO2  20*  22  20*  21   BUN  93*  102*  94*  91*   CREA  4.06*  3.91*  4.10*  3.92*   GLU  267*  329*  419*  340*   PHOS  3.7  3.4  3.7  4.0   MG   --   1.8  1.7  1.5*   CA  8.4*  8.1*  7.6*  7.4*   ALB  2.9*  2.7*  2.7*  2.6*   WBC   --    --    --   21.1*   HGB   --    --    --   7.8*   HCT   --    --    --   23.1*   PLT   --    --    --   316      Recent Labs      02/09/17 0335 02/08/17   0700 02/07/17   0130   INR  3.7*  3.3*  2.6*   PTP  39.3*  34.6*  27.4*     Needs: urine analysis, urine sodium, protein and creatinine  Lab Results   Component Value Date/Time    Sodium urine, random 25 11/10/2014 12:40 PM    Creatinine, urine 115.07 02/04/2017 09:33 AM         Discussed with:  pt    : Yoni Del Cid MD  2/9/2017        [facility]:  [email or web address]  [employer] office:  [address] [place] [address], [address] [place] [address] [address]  [place], [address] [facility]  Phone: [phone]  Fax :     [phone]    [place] office:  [address] [facility], [address]  Phone - [phone]  Fax - [phone]

## 2017-02-09 NOTE — DISCHARGE INSTRUCTIONS
HOSPITALIST DISCHARGE INSTRUCTIONS  NAME: Gee Cooper   :  1955   MRN:  [id number]     Date/Time:  2/10/2017 11:07 AM    ADMIT DATE: 2017     DISCHARGE DATE: 2/10/2017     ADMITTING DIAGNOSIS:  Interstitial lung disease  DVT (blood clot in the legs)  Chronic kidney disease    DISCHARGE DIAGNOSIS:  As above    MEDICATIONS:    · It is important that you take the medication exactly as they are prescribed. · Keep your medication in the bottles provided by the pharmacist and keep a list of the medication names, dosages, and times to be taken in your wallet. · Do not take other medications without consulting your doctor. Pain Management: per above medications    What to do at Home:  1. Be sure to take your steroids and use your breathing treatments as prescribed. 2.  You have been started on warfarin for a blood clot in your legs. You need to follow up with your new primary care at your scheduled appointment for an INR check. 3.  The dose of your bumex has changed due to your kidney function. You need to follow up with Dr. Cristian Shah within 1-2 weeks. Recommended diet:  Resume previous diet    Recommended activity: Activity as tolerated    If you experience any of the following symptoms then please call your primary care physician or return to the emergency room if you cannot get hold of your doctor:  Fever, chills, nausea, vomiting, diarrhea, change in mentation, falling, bleeding, shortness of breath        Information obtained by :  I understand that if any problems occur once I am at home I am to contact my physician. I understand and acknowledge receipt of the instructions indicated above.                                                                                                                                            Physician's or R.N.'s Signature                                                                  Date/Time Patient or Representative Signature                                                          Date/Time

## 2017-02-09 NOTE — PROGRESS NOTES
2/10/2017 2:15  [facility] and left message for [employer] in intake about switching this pt's Rollator to a bariatric Rollator at her home. Updated orders were faxed yesterday. Response received today that [employer] will swap rollator. Vandana Paulino     2/10/2017 11:19 AM Discussed case with Dr. Aparna Sheets. Plan is for this pt to return to their home today. SW spoke with respiratory therapy, they confirm that they will not be loaning this pt a tank to go home with. SW spoke with this pt and her  this AM. SW informed them that they would not be given a loaner tank. Pt states that she spoke with her dtr and that her dtr will be bringing her a tank/concentrator to the hospital. No other needs. Vandana Paulino     2/9/2017 5:28 PM Pt stating at discharge that she does not have an oxygen tank to get home. Pt reports that he concentrator is broken. Pt  states that there is a stationary concentrator in the pt's home. They report that the pt needs assistance getting from hospital to home. SW called [facility]. They reported that they do not deliver tanks to the hospital. SW informed pt that [facility] will not deliver a tank. BÁRBARA spoke with pt's  and informed him that if this is simply a matter of getting home and there is a working stationary concentrator there, that his best option would be to call a wheel chair transport company who also provides oxygen. SW informed him that this is not a covered benefit and would require that they pay for the transport. CALVIN Paulino      2/9/2017 1:43 PM Plan is for discharge today. Rollator was delivered, but too small. Referral sent to [employer] to deliver this pt's rollator to her at home and swap it out for a larger one.    CALVIN Paulino

## 2017-02-09 NOTE — PROGRESS NOTES
Pt has discharge orders to home today. Pt reports that she still has not had a BM. CM reports that he has spoken with [employer] and they are unable to fix pt's portable oxygen concentrator today but will be able to come to pt's home tomorrow to fix concentrator. Called, left message for Dr. Aaron Epstein. [address] [place] [address] Dr. Aaron Epstein, informed of the above. Per MD discontinue discharge order for today. Plan to discharge pt tomorrow.

## 2017-02-09 NOTE — PROGRESS NOTES
Name: Meera Etienne: [address]   : 1955 Admit Date: 2017   Phone: [phone]  Room: [facility]   PCP: None  MRN: [id number]   Date: 2017  Code: Full Code          Chart and notes reviewed. Data reviewed. I review the patient's current medications in the medical record at each encounter. I have evaluated and examined the patient. Overnight events  Afebrile  Sats 98% on 2L  Na 135  Creat 4.06  INR 3.7  Glucose elevated  Resp cx no growth  Pna work up negative  KUB unremarkable    ROS: Feeling about the same this morning. Continue with occasional nonproductive cough. Making progress with therapy and maintained sats with O2 in place. She report small BM and abd cramping   Denies CP.         Current Facility-Administered Medications   Medication Dose Route Frequency    insulin NPH (NOVOLIN N, HUMULIN N) injection 65 Units  65 Units SubCUTAneous ACB&D    predniSONE (DELTASONE) tablet 30 mg  30 mg Oral DAILY WITH BREAKFAST    [START ON 2017] predniSONE (DELTASONE) tablet 20 mg  20 mg Oral DAILY WITH BREAKFAST    [START ON 2/15/2017] predniSONE (DELTASONE) tablet 10 mg  10 mg Oral DAILY WITH BREAKFAST    lactulose (CHRONULAC) solution 30 g  30 g Oral Q2H    chlorpheniramine-HYDROcodone (TUSSIONEX) oral suspension 5 mL  5 mL Oral Q12H    linaclotide (LINZESS) capsule 290 mcg  290 mcg Oral ACB    nitroglycerin (NITROSTAT) tablet 0.4 mg  0.4 mg SubLINGual Q5MIN PRN    influenza vaccine - (36mos+)(PF) (FLUZONE/FLUARIX/FLULAVAL QUAD) injection 0.5 mL  0.5 mL IntraMUSCular PRIOR TO DISCHARGE    oxyCODONE IR (ROXICODONE) tablet 10 mg  10 mg Oral Q3H PRN    polyethylene glycol (MIRALAX) packet 17 g  17 g Oral DAILY    senna-docusate (PERICOLACE) 8.6-50 mg per tablet 2 Tab  2 Tab Oral DAILY    sodium chloride (NS) flush 5-10 mL  5-10 mL IntraVENous Q8H    sodium chloride (NS) flush 5-10 mL  5-10 mL IntraVENous PRN    naloxone (NARCAN) injection 0.4 mg 0.4 mg IntraVENous PRN    ondansetron (ZOFRAN) injection 4 mg  4 mg IntraVENous Q4H PRN    glucose chewable tablet 16 g  4 Tab Oral PRN    dextrose (D50W) injection syrg 12.5-25 g  12.5-25 g IntraVENous PRN    glucagon (GLUCAGEN) injection 1 mg  1 mg IntraMUSCular PRN    insulin lispro (HUMALOG) injection   SubCUTAneous AC&HS    isosorbide mononitrate ER (IMDUR) tablet 120 mg  120 mg Oral DAILY    carvedilol (COREG) tablet 25 mg  25 mg Oral BID WITH MEALS    clopidogrel (PLAVIX) tablet 75 mg  75 mg Oral DAILY    pantoprazole (PROTONIX) tablet 40 mg  40 mg Oral DAILY    amLODIPine (NORVASC) tablet 10 mg  10 mg Oral DAILY    atorvastatin (LIPITOR) tablet 80 mg  80 mg Oral QPM    Warfarin pharmacy to dose   Other Rx Dosing/Monitoring    albuterol-ipratropium (DUO-NEB) 2.5 MG-0.5 MG/3 ML  3 mL Nebulization Q6H RT    benzonatate (TESSALON) capsule 100 mg  100 mg Oral TID PRN    fluticasone (FLONASE) 50 mcg/actuation nasal spray 2 Spray  2 Spray Both Nostrils QHS    temazepam (RESTORIL) capsule 30 mg  30 mg Oral QHS PRN    sucralfate (CARAFATE) tablet 1 g  1 g Oral AC&HS         REVIEW OF SYSTEMS   Negative except as stated in the HPI. Physical Exam:   Visit Vitals    /79 (BP 1 Location: Left arm, BP Patient Position: At rest)    Pulse 79    Temp 97.9 °F (36.6 °C)    Resp 18    Ht 5' 10\" (1.778 m)    Wt (!) 160.6 kg (354 lb 0.9 oz)    SpO2 98%    Breastfeeding No    BMI 50.8 kg/m2       General:  Alert, cooperative, no distress, appears stated age. Head:  Normocephalic, without obvious abnormality, atraumatic. Eyes:  Conjunctivae/corneas clear. Nose: Nares normal. Septum midline. Mucosa normal.    Throat: Lips, mucosa, and tongue normal. Class 4 airway. Neck: Thick, supple, symmetrical, trachea midline, no adenopathy. Lungs:   Occasional rhonchi, but otherwise mostly clear. Chest wall:  No tenderness or deformity.    Heart:  Regular rate and rhythm, S1, S2 normal, no murmur, click, rub or gallop. Abdomen:   Obese, soft, generalized TTP. Bowel sounds normal. No masses,  No organomegaly. Extremities: Extremities normal, atraumatic, no cyanosis, +LE edema, R>L. Pulses: 2+ and symmetric all extremities. Skin: Skin color, texture, turgor normal. No rashes or lesions   Lymph nodes: Cervical, supraclavicular nodes normal.   Neurologic: Grossly nonfocal       Lab Results   Component Value Date/Time    Sodium 136 02/09/2017 03:35 AM    Potassium 3.9 02/09/2017 03:35 AM    Chloride 103 02/09/2017 03:35 AM    CO2 20 02/09/2017 03:35 AM    BUN 93 02/09/2017 03:35 AM    Creatinine 4.06 02/09/2017 03:35 AM    Glucose 267 02/09/2017 03:35 AM    Calcium 8.4 02/09/2017 03:35 AM    Magnesium 1.8 02/08/2017 07:00 AM    Phosphorus 3.7 02/09/2017 03:35 AM    Lactic acid 1.2 02/02/2017 03:20 PM       Lab Results   Component Value Date/Time    WBC 21.1 02/06/2017 01:37 PM    HGB 7.8 02/06/2017 01:37 PM    PLATELET 291 06/55/2459 01:37 PM    MCV 89.5 02/06/2017 01:37 PM       Lab Results   Component Value Date/Time    INR 3.7 02/09/2017 03:35 AM    aPTT 57.4 02/05/2017 11:20 PM    AST (SGOT) 11 02/02/2017 03:20 PM    Alk.  phosphatase 79 02/02/2017 03:20 PM    Protein, total 6.9 02/02/2017 03:20 PM    Albumin 2.9 02/09/2017 03:35 AM    Globulin 4.2 02/02/2017 03:20 PM       Lab Results   Component Value Date/Time    Iron 29 11/09/2014 03:30 AM    TIBC 245 11/09/2014 03:30 AM    Iron % saturation 12 11/09/2014 03:30 AM    Ferritin 255 11/09/2014 04:00 AM       No results found for: SR, CRP, GLENNY, ANAIGG, RA, RPR, RPRT, VDRLT, VDRLS, TSH, TSHEXT, TSHEXT     No results found for: PH, PHI, PCO2, PCO2I, PO2, PO2I, HCO3, HCO3I, FIO2, FIO2I    Lab Results   Component Value Date/Time    CK 72 07/01/2015 07:42 PM    CK-MB Index CANNOT BE CALCULATED 07/01/2015 07:42 PM    Troponin-I, Qt. <0.04 02/03/2017 03:30 AM        Lab Results   Component Value Date/Time    Culture result: HEAVY  NORMAL RESPIRATORY WAGNER   02/03/2017 04:10 PM    Culture result: MODERATE  CANDIDA TROPICALIS   02/03/2017 04:10 PM    Culture result:  02/03/2017 04:10 PM     CULTURE WILL BE HELD FOR 4 WEEKS. IF THERE IS ADDITIONAL FUNGAL GROWTH, A NEW REPORT WILL FOLLOW. No results found for: TOXA1, RPR, HBCM, HBSAG, HAAB, HCAB, HCAB1, HAAT, G6PD, CRYAC, HIVGT, HIVR, HIV1, HIV12, HIVPC, HIVRPI    Lab Results   Component Value Date/Time    CK 72 07/01/2015 07:42 PM    CK 42 11/09/2014 03:30 AM       Lab Results   Component Value Date/Time    Color YELLOW/STRAW 02/03/2017 05:36 AM    Appearance CLEAR 02/03/2017 05:36 AM    pH (UA) 5.0 02/03/2017 05:36 AM    Protein 100 02/03/2017 05:36 AM    Glucose NEGATIVE  02/03/2017 05:36 AM    Ketone NEGATIVE  02/03/2017 05:36 AM    Bilirubin NEGATIVE  02/03/2017 05:36 AM    Blood NEGATIVE  02/03/2017 05:36 AM    Urobilinogen 0.2 02/03/2017 05:36 AM    Nitrites NEGATIVE  02/03/2017 05:36 AM    Leukocyte Esterase NEGATIVE  02/03/2017 05:36 AM    WBC 0-4 02/03/2017 05:36 AM    RBC 0-5 02/03/2017 05:36 AM    Bacteria NEGATIVE  02/03/2017 05:36 AM       Images: no new images this morning    IMPRESSION  · Acute/chronic hypoxic respiratory failure: ? ILD exacerbation  · RLE DVT; may also have PE, but allergic to contrast and had CKD, so not able to check CTA. Agree that V/Q not likley to be helpful in light of ILD.   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 at [facility] in 2010  · JASEN  · Chest pain  · Diastolic CHF  · Pulmonary HTN  · HTN  · Acute/chronic kidney disease stage 4  · History of tobacco use; quit 11/2014    PLAN  · Continue O2 and wean as able to keep sats > 90%  · F/U broch cytology  · Continue scheduled Duonebs  · Continue prednisone taper  · No additional abx for now given multiple recent abx courses and no growth on bronch  · Continue Tessalon, Tussionex, and Flonase  · Diuresis per Renal; holding loops and ARB for now; resume loops at discharge   · Cardiology eval appreciated  · Anticoagulated with Coumadin: will need to remain on anticoagulation for at least 3-6 months; holding again today due to elevated INR  · CPAP nightly  · Mobilize with PT  · Bowel regimen/emena   · GI prophylaxis: Protonix    Patient is stable from a pulmonary standpoint. We will sign off and arrange for outpatiemt follow up with Dr. Natalya Helm in 2-4 weeks. Please call with questions.     Al[place], [address]

## 2017-02-09 NOTE — DISCHARGE SUMMARY
Physician Discharge Summary     Patient ID:  Mir López  [id number]  46 y.o.  1955    Admit date: 2/2/2017    Discharge date and time: 2/9/2017    Admission Diagnoses: DVT (deep venous thrombosis) ([facility]  airway survey    Discharge Diagnoses: Active Problems:    CAD (coronary Artery Disease) Native Artery (2/16/2011)      Overview: [place] 2004      1v CAD by cath - PCI OM with SAL      Cath 5/2004 - patent stent, no new disease      Lexiscan cardiolite 12/09- normal perfusion, EF 66%      Cath: 3/19/12: PA 55/30 mean 41, wedge 26, RA 24, EDP 35, LVG 55%, LM       normal, LAD normal, LCX - OM1 patent stent, OM2 prox 85% long, %       with L to R collaterals             Interstitial lung disease ([facility] (2/28/2011)      Pulmonary HTN ([facility] (3/21/2012)      HTN (hypertension) (10/1/2012)      Morbid obesity (10/1/2012)      Chronic diastolic heart failure ([facility] (10/5/2012)      CKD (chronic kidney disease) stage 3, GFR 30-59 ml/min (10/5/2012)      Normocytic anemia (5/26/2013)      DM (diabetes mellitus), type 2 with renal complications ([facility] (6/6/8970)      DVT (deep venous thrombosis) ([facility] (2/2/2017)           Hospital Course:   Acute on chronic hypoxemic respiratory failure / Interstitial lung disease: unclear precipitant. Underwent bronch on 2/3, found to have mild mucoid secretions noted bilaterally consistent with mild bilateral pneumonia. S/p tx with multiple rounds of abx, further abx tx on hold. Improved with IV steroids and nebs. Discharged on prednisone taper      Acute DVT (deep venous thrombosis): continue coumadin, INR therapeutic. Pt has follow up with primary care doc in one week for INR check     Chronic diastolic heart failure / Pulmonary HTN / CAD (coronary Artery Disease) Native Artery: in setting of worsening SOB. Continue statin, coreg, imdur. ARB on hold.  plavix stopped per cards recs      HTN (hypertension): cont Norvasc, Coreg, ISMN      Morbid obesity: Counseled on weight loss      ROSEANN on CKD (chronic kidney disease) stage 4: nephrology following. Cr stable today. holding ARB. Diuretic resume on discharge at decreased dose. Follow up with nephrology set up      Anemia: likely ACD from CKD      DM (diabetes mellitus), type 2 with renal complications: poorly controlled on steroids. Worsened with steroids. Labile BG, A1c 6.6%. Discussed with pt. She usually takes 70/30 at home and feels comfortable titrating her dose while on steroids to control her BG     Constipation: s/p BM after starting lactulose yesterday. Pt counseled to take miralax and pericolace daily at home. PCP: None     Consults: Cardiology, Pulmonary/Intensive care and Nephrology    Condition of patient at discharge: improved    Discharge Exam:  Please refer to progress note from today. Disposition: home    Patient Instructions:   Current Discharge Medication List      START taking these medications    Details   chlorpheniramine-HYDROcodone (TUSSIONEX) 10-8 mg/5 mL suspension Take 5 mL by mouth every twelve (12) hours. Max Daily Amount: 10 mL. Qty: 115 mL, Refills: 0      linaclotide (LINZESS) 290 mcg cap capsule Take 1 Cap by mouth Daily (before breakfast) for 10 days. Qty: 10 Cap, Refills: 0      polyethylene glycol (MIRALAX) 17 gram packet Take 1 Packet by mouth daily. Qty: 14 Packet, Refills: 0      senna-docusate (PERICOLACE) 8.6-50 mg per tablet Take 2 Tabs by mouth daily. Qty: 28 Tab, Refills: 0      warfarin (COUMADIN) 4 mg tablet Take 1 Tab by mouth daily. Qty: 15 Tab, Refills: 0         CONTINUE these medications which have CHANGED    Details   bumetanide (BUMEX) 1 mg tablet Take 1 Tab by mouth two (2) times a day.   Qty: 60 Tab, Refills: 0    Associated Diagnoses: Chronic diastolic heart failure (HCC)      predniSONE (DELTASONE) 10 mg tablet Take 30mg for three days then 20mg for three days then 10mg for three days  Qty: 18 Tab, Refills: 0         CONTINUE these medications which have NOT CHANGED    Details   albuterol-ipratropium (DUONEB) 2.5 mg-0.5 mg/3 ml nebu 3 mL by Nebulization route every four (4) hours. sucralfate (CARAFATE) 1 gram tablet Take 1 g by mouth Before breakfast, lunch, dinner and at bedtime. fluticasone (FLONASE) 50 mcg/actuation nasal spray 2 Sprays by Both Nostrils route nightly. mupirocin (BACTROBAN) 2 % ointment Apply  to affected area two (2) times daily as needed (lesions on feet when needed). Ointment external two times daily to lesions on feet until healed - now using as needed      temazepam (RESTORIL) 30 mg capsule Take 30 mg by mouth nightly as needed for Sleep.      albuterol (PROVENTIL HFA, VENTOLIN HFA, PROAIR HFA) 90 mcg/actuation inhaler Take 2 Puffs by inhalation every four (4) hours as needed for Wheezing. calcitRIOL (ROCALTROL) 0.25 mcg capsule Take 0.25 mcg by mouth two (2) days a week. Patient takes on Monday and Thursday      benzonatate (TESSALON PERLES) 100 mg capsule Take 100 mg by mouth three (3) times daily as needed for Cough. isosorbide mononitrate ER (IMDUR) 120 mg CR tablet TAKE 1 TABLET BY MOUTH EVERY DAY  Qty: 30 Tab, Refills: 5      carvedilol (COREG) 25 mg tablet TAKE 1 TABLET BY MOUTH TWICE A DAY  Qty: 180 Tab, Refills: 3      pantoprazole (PROTONIX) 40 mg tablet Take 1 Tab by mouth daily. Indications: GASTROESOPHAGEAL REFLUX  Qty: 90 Tab, Refills: 1      nitroglycerin (NITROSTAT) 0.3 mg SL tablet 1 Tab by SubLINGual route every five (5) minutes as needed for Chest Pain. Qty: 1 Bottle, Refills: 1    Associated Diagnoses: Atherosclerosis of native coronary artery of native heart with stable angina pectoris ([address] Angina, class III (HCC)      INSULIN LISPRO (HUMALOG SC) by SubCUTAneous route Before breakfast, lunch, and dinner. Sliding scale. oxyCODONE-acetaminophen (PERCOCET 7.5) 7.5-325 mg per tablet Take 1 Tab by mouth every four (4) hours as needed.   Refills: 0      amLODIPine (NORVASC) 10 mg tablet Take 10 mg by mouth daily. ergocalciferol (VITAMIN D2) 50,000 unit capsule Take 50,000 Units by mouth every Tuesday and Thursday. aspirin 81 mg tablet Take 81 mg by mouth daily. atorvastatin (LIPITOR) 80 mg tablet Take 80 mg by mouth every evening.            STOP taking these medications       amoxicillin-clavulanate (AUGMENTIN) 875-125 mg per tablet Comments:   Reason for Stopping:         guaiFENesin-codeine (CHERATUSSIN AC) 100-10 mg/5 mL solution Comments:   Reason for Stopping:         losartan (COZAAR) 25 mg tablet Comments:   Reason for Stopping:         clopidogrel (PLAVIX) 75 mg tablet Comments:   Reason for Stopping:             Activity: Activity as tolerated  Diet: Cardiac Diet and Diabetic Diet  Wound Care: None needed    Follow-up tests/labs INR and BMP    Approximate time spent in patient care on day of discharge: 33 minutes    Signed:  Cyndra Fleischer, MD  2/9/2017  11:11 AM

## 2017-02-09 NOTE — PROGRESS NOTES
Pt informed me that her portable O2 tank has been broken for \" a long time\" Osmany Khan with case management made aware.  Pt instructed to call her home O2 company

## 2017-02-09 NOTE — PROGRESS NOTES
Bedside and Verbal shift change report given to Bertha Sullivan RN (oncoming nurse) by Mel Wallace RN (offgoing nurse). Report included the following information SBAR, Kardex, Intake/Output, MAR, Accordion and Recent Results.

## 2017-02-09 NOTE — PROGRESS NOTES
E[facility]io [facility]  [address] [facility], [address] [place] [address]  [phone]      Medical Progress Note      NAME: Nadiya Dhillon   :  1955  MRM:  [id number]    Date/Time: 2017           Subjective:     Chief Complaint:  I'm tired. No acute events. Breathing is stable. Had BM yesterday. Is having some cramping today from the lactulose          Objective:       Vitals:          Last 24hrs VS reviewed since prior progress note.  Most recent are:    Visit Vitals    /72 (BP 1 Location: Left arm, BP Patient Position: At rest)    Pulse 72    Temp 98.5 °F (36.9 °C)    Resp 18    Ht 5' 10\" (1.778 m)    Wt (!) 160.6 kg (354 lb 0.9 oz)    SpO2 95%    Breastfeeding No    BMI 50.8 kg/m2     SpO2 Readings from Last 6 Encounters:   17 95%   17 98%   10/14/16 98%   16 94%   16 97%   16 100%    O2 Flow Rate (L/min): 2 l/min       Intake/Output Summary (Last 24 hours) at 17 1112  Last data filed at 17 1757   Gross per 24 hour   Intake              720 ml   Output                0 ml   Net              720 ml          Exam:     Physical Exam:    Gen:  obese, in no acute distress  HEENT:  Pink conjunctivae, PERRL, hearing intact to voice, moist mucous membranes  Neck:  Supple, without masses, thyroid non-tender  Resp:  No accessory muscle use, mild rhonchi bilateral  Card:  No murmurs, normal S1, S2 without thrills, bruits or peripheral edema  Abd:  Soft, non-tender, non-distended, normoactive bowel sounds are present  Musc:  No cyanosis or clubbing  Skin:  No rashes or ulcers, skin turgor is good  Neuro:  No focal deficits, follows commands appropriately  Psych:  Good insight, oriented to person, place and time, alert      Medications Reviewed: (see below)    Lab Data Reviewed: (see below)    ______________________________________________________________________    Medications:     Current Facility-Administered Medications Medication Dose Route Frequency    insulin NPH (NOVOLIN N, HUMULIN N) injection 65 Units  65 Units SubCUTAneous ACB&D    predniSONE (DELTASONE) tablet 30 mg  30 mg Oral DAILY WITH BREAKFAST    [START ON 2/12/2017] predniSONE (DELTASONE) tablet 20 mg  20 mg Oral DAILY WITH BREAKFAST    [START ON 2/15/2017] predniSONE (DELTASONE) tablet 10 mg  10 mg Oral DAILY WITH BREAKFAST    lactulose (CHRONULAC) solution 30 g  30 g Oral Q2H    chlorpheniramine-HYDROcodone (TUSSIONEX) oral suspension 5 mL  5 mL Oral Q12H    linaclotide (LINZESS) capsule 290 mcg  290 mcg Oral ACB    nitroglycerin (NITROSTAT) tablet 0.4 mg  0.4 mg SubLINGual Q5MIN PRN    influenza vaccine 2016-17 (36mos+)(PF) (FLUZONE/FLUARIX/FLULAVAL QUAD) injection 0.5 mL  0.5 mL IntraMUSCular PRIOR TO DISCHARGE    oxyCODONE IR (ROXICODONE) tablet 10 mg  10 mg Oral Q3H PRN    polyethylene glycol (MIRALAX) packet 17 g  17 g Oral DAILY    senna-docusate (PERICOLACE) 8.6-50 mg per tablet 2 Tab  2 Tab Oral DAILY    sodium chloride (NS) flush 5-10 mL  5-10 mL IntraVENous Q8H    sodium chloride (NS) flush 5-10 mL  5-10 mL IntraVENous PRN    naloxone (NARCAN) injection 0.4 mg  0.4 mg IntraVENous PRN    ondansetron (ZOFRAN) injection 4 mg  4 mg IntraVENous Q4H PRN    glucose chewable tablet 16 g  4 Tab Oral PRN    dextrose (D50W) injection syrg 12.5-25 g  12.5-25 g IntraVENous PRN    glucagon (GLUCAGEN) injection 1 mg  1 mg IntraMUSCular PRN    insulin lispro (HUMALOG) injection   SubCUTAneous AC&HS    isosorbide mononitrate ER (IMDUR) tablet 120 mg  120 mg Oral DAILY    carvedilol (COREG) tablet 25 mg  25 mg Oral BID WITH MEALS    clopidogrel (PLAVIX) tablet 75 mg  75 mg Oral DAILY    pantoprazole (PROTONIX) tablet 40 mg  40 mg Oral DAILY    amLODIPine (NORVASC) tablet 10 mg  10 mg Oral DAILY    atorvastatin (LIPITOR) tablet 80 mg  80 mg Oral QPM    Warfarin pharmacy to dose   Other Rx Dosing/Monitoring    albuterol-ipratropium (DUO-NEB) 2.5 MG-0.5 MG/3 ML  3 mL Nebulization Q6H RT    benzonatate (TESSALON) capsule 100 mg  100 mg Oral TID PRN    fluticasone (FLONASE) 50 mcg/actuation nasal spray 2 Spray  2 Spray Both Nostrils QHS    temazepam (RESTORIL) capsule 30 mg  30 mg Oral QHS PRN    sucralfate (CARAFATE) tablet 1 g  1 g Oral AC&HS            Lab Review:     Recent Labs      02/06/17   1337   WBC  21.1*   HGB  7.8*   HCT  23.1*   PLT  316     Recent Labs      02/09/17   0335  02/08/17   0700  02/07/17   0130  02/06/17   1337   NA  136  135*  133*  135*   K  3.9  4.6  4.2  3.8   CL  103  103  101  101   CO2  20*  22  20*  21   GLU  267*  329*  419*  340*   BUN  93*  102*  94*  91*   CREA  4.06*  3.91*  4.10*  3.92*   CA  8.4*  8.1*  7.6*  7.4*   MG   --   1.8  1.7  1.5*   PHOS  3.7  3.4  3.7  4.0   ALB  2.9*  2.7*  2.7*  2.6*   INR  3.7*  3.3*  2.6*   --      No components found for: [place]         Assessment / Plan:     Acute on chronic hypoxemic respiratory failure / Interstitial lung disease: unclear precipitant. Underwent bronch on 2/3, found to have mild mucoid secretions noted bilaterally consistent with mild bilateral pneumonia. S/p tx with multiple rounds of abx, further abx tx on hold. Improved with IV steroids and nebs. Discharged on prednisone taper      Acute DVT (deep venous thrombosis): continue coumadin, INR therapeutic. Pt has follow up with primary care doc in one week for INR check    Chronic diastolic heart failure / Pulmonary HTN / CAD (coronary Artery Disease) Native Artery: in setting of worsening SOB. Continue statin, coreg, imdur. ARB on hold. plavix stopped per cards recs      HTN (hypertension): cont Norvasc, Coreg, ISMN      Morbid obesity:  Counseled on weight loss      ROSEANN on CKD (chronic kidney disease) stage 4: nephrology following. Cr stable today. holding ARB. Diuretic resume on discharge at decreased dose.  Follow up with nephrology set up      Anemia: likely ACD from CKD     DM (diabetes mellitus), type 2 with renal complications: poorly controlled on steroids. Worsened with steroids. Labile BG, A1c 6.6%. Discussed with pt. She usually takes 70/30 at home and feels comfortable titrating her dose while on steroids to control her BG    Constipation: s/p BM after starting lactulose yesterday. Pt counseled to take miralax and pericolace daily at home.      Total time spent with patient: 35 [address] [place] [address] discussed with: Patient, Family and Consultant/Specialist    Discussed:  Care Plan    Prophylaxis:  Coumadin    Disposition:  Home w/Family           ___________________________________________________    Attending Physician: José Gray MD

## 2017-02-10 VITALS
WEIGHT: 293 LBS | TEMPERATURE: 98.1 F | DIASTOLIC BLOOD PRESSURE: 65 MMHG | HEIGHT: 70 IN | RESPIRATION RATE: 18 BRPM | HEART RATE: 80 BPM | BODY MASS INDEX: 41.95 KG/M2 | OXYGEN SATURATION: 99 % | SYSTOLIC BLOOD PRESSURE: 142 MMHG

## 2017-02-10 PROBLEM — J96.21 ACUTE AND CHRONIC RESPIRATORY FAILURE WITH HYPOXIA (HCC): Status: ACTIVE | Noted: 2017-02-10

## 2017-02-10 PROBLEM — N18.4 CKD (CHRONIC KIDNEY DISEASE) STAGE 4, GFR 15-29 ML/MIN (HCC): Chronic | Status: ACTIVE | Noted: 2017-02-10

## 2017-02-10 LAB
ALBUMIN SERPL BCP-MCNC: 3 G/DL (ref 3.5–5)
ANION GAP BLD CALC-SCNC: 9 MMOL/L (ref 5–15)
BUN SERPL-MCNC: 93 MG/DL (ref 6–20)
BUN/CREAT SERPL: 22 (ref 12–20)
CALCIUM SERPL-MCNC: 8.5 MG/DL (ref 8.5–10.1)
CHLORIDE SERPL-SCNC: 105 MMOL/L (ref 97–108)
CO2 SERPL-SCNC: 22 MMOL/L (ref 21–32)
CREAT SERPL-MCNC: 4.15 MG/DL (ref 0.55–1.02)
GLUCOSE BLD STRIP.AUTO-MCNC: 127 MG/DL (ref 65–100)
GLUCOSE BLD STRIP.AUTO-MCNC: 131 MG/DL (ref 65–100)
GLUCOSE SERPL-MCNC: 121 MG/DL (ref 65–100)
INR PPP: 3 (ref 0.9–1.1)
PHOSPHATE SERPL-MCNC: 4.6 MG/DL (ref 2.6–4.7)
POTASSIUM SERPL-SCNC: 4.2 MMOL/L (ref 3.5–5.1)
PROTHROMBIN TIME: 31.4 SEC (ref 9–11.1)
SERVICE CMNT-IMP: ABNORMAL
SERVICE CMNT-IMP: ABNORMAL
SODIUM SERPL-SCNC: 136 MMOL/L (ref 136–145)

## 2017-02-10 PROCEDURE — 74011636637 HC RX REV CODE- 636/637: Performed by: INTERNAL MEDICINE

## 2017-02-10 PROCEDURE — 74011000250 HC RX REV CODE- 250: Performed by: INTERNAL MEDICINE

## 2017-02-10 PROCEDURE — 74011250637 HC RX REV CODE- 250/637: Performed by: INTERNAL MEDICINE

## 2017-02-10 PROCEDURE — 74011250636 HC RX REV CODE- 250/636: Performed by: INTERNAL MEDICINE

## 2017-02-10 PROCEDURE — 36415 COLL VENOUS BLD VENIPUNCTURE: CPT | Performed by: INTERNAL MEDICINE

## 2017-02-10 PROCEDURE — 74011636637 HC RX REV CODE- 636/637: Performed by: PHYSICIAN ASSISTANT

## 2017-02-10 PROCEDURE — 74011250637 HC RX REV CODE- 250/637: Performed by: PHYSICIAN ASSISTANT

## 2017-02-10 PROCEDURE — 94640 AIRWAY INHALATION TREATMENT: CPT

## 2017-02-10 PROCEDURE — 80069 RENAL FUNCTION PANEL: CPT | Performed by: INTERNAL MEDICINE

## 2017-02-10 PROCEDURE — 82962 GLUCOSE BLOOD TEST: CPT

## 2017-02-10 PROCEDURE — 85610 PROTHROMBIN TIME: CPT | Performed by: INTERNAL MEDICINE

## 2017-02-10 PROCEDURE — 74011250637 HC RX REV CODE- 250/637: Performed by: NURSE PRACTITIONER

## 2017-02-10 PROCEDURE — 77010033678 HC OXYGEN DAILY

## 2017-02-10 RX ORDER — BUMETANIDE 1 MG/1
1 TABLET ORAL 2 TIMES DAILY
Status: DISCONTINUED | OUTPATIENT
Start: 2017-02-10 | End: 2017-02-10 | Stop reason: HOSPADM

## 2017-02-10 RX ADMIN — ONDANSETRON 4 MG: 2 INJECTION INTRAMUSCULAR; INTRAVENOUS at 15:55

## 2017-02-10 RX ADMIN — POLYETHYLENE GLYCOL 3350 17 G: 17 POWDER, FOR SOLUTION ORAL at 08:59

## 2017-02-10 RX ADMIN — SUCRALFATE 1 G: 1 TABLET ORAL at 06:39

## 2017-02-10 RX ADMIN — PANTOPRAZOLE SODIUM 40 MG: 40 TABLET, DELAYED RELEASE ORAL at 08:59

## 2017-02-10 RX ADMIN — IPRATROPIUM BROMIDE AND ALBUTEROL SULFATE 3 ML: .5; 2.5 SOLUTION RESPIRATORY (INHALATION) at 07:34

## 2017-02-10 RX ADMIN — OXYCODONE HYDROCHLORIDE 10 MG: 5 TABLET ORAL at 15:30

## 2017-02-10 RX ADMIN — CARVEDILOL 25 MG: 12.5 TABLET, FILM COATED ORAL at 08:58

## 2017-02-10 RX ADMIN — PREDNISONE 30 MG: 5 TABLET ORAL at 08:59

## 2017-02-10 RX ADMIN — BUMETANIDE 1 MG: 1 TABLET ORAL at 11:42

## 2017-02-10 RX ADMIN — HYDROCODONE POLISTIREX AND CHLORPHENIRAMINE POLISTIREX 5 ML: 10; 8 SUSPENSION, EXTENDED RELEASE ORAL at 08:59

## 2017-02-10 RX ADMIN — ISOSORBIDE MONONITRATE 120 MG: 30 TABLET ORAL at 08:58

## 2017-02-10 RX ADMIN — IPRATROPIUM BROMIDE AND ALBUTEROL SULFATE 3 ML: .5; 2.5 SOLUTION RESPIRATORY (INHALATION) at 01:38

## 2017-02-10 RX ADMIN — AMLODIPINE BESYLATE 10 MG: 5 TABLET ORAL at 08:59

## 2017-02-10 RX ADMIN — HUMAN INSULIN 65 UNITS: 100 INJECTION, SUSPENSION SUBCUTANEOUS at 08:59

## 2017-02-10 RX ADMIN — IPRATROPIUM BROMIDE AND ALBUTEROL SULFATE 3 ML: .5; 2.5 SOLUTION RESPIRATORY (INHALATION) at 13:23

## 2017-02-10 RX ADMIN — LINACLOTIDE 290 MCG: 145 CAPSULE, GELATIN COATED ORAL at 06:39

## 2017-02-10 RX ADMIN — Medication 10 ML: at 14:00

## 2017-02-10 RX ADMIN — Medication 2 TABLET: at 08:59

## 2017-02-10 RX ADMIN — SUCRALFATE 1 G: 1 TABLET ORAL at 11:42

## 2017-02-10 NOTE — PROGRESS NOTES
Bedside and Verbal shift change report given to [address] Garduno RN (oncoming nurse) by Ramon Sheets RN (offgoing nurse). Report included the following information SBAR and Kardex.

## 2017-02-10 NOTE — PROGRESS NOTES
Patient refused plavix stated \"Cardiologist told her to stop taking it\", per patient request plavix not given in am med administration

## 2017-02-10 NOTE — DISCHARGE SUMMARY
Physician Discharge Summary     Patient ID:  Levi Pittman  [id number]  44 y.o.  1955    Admit date: 2/2/2017    Discharge date: 2/10/2017    Admission Diagnoses: DVT (deep venous thrombosis) ([facility]  airway survey    Discharge Diagnoses:  Principal Diagnosis Acute and chronic respiratory failure with hypoxia ([facility]                                            Principal Problem:    Acute and chronic respiratory failure with hypoxia ([facility] (2/10/2017)    Active Problems:    CAD (coronary Artery Disease) Native Artery (2/16/2011)      Overview: [place] 2004      1v CAD by cath - PCI OM with SAL      Cath 5/2004 - patent stent, no new disease      Lexiscan cardiolite 12/09- normal perfusion, EF 66%      Cath: 3/19/12: PA 55/30 mean 41, wedge 26, RA 24, EDP 35, LVG 55%, LM       normal, LAD normal, LCX - OM1 patent stent, OM2 prox 85% long, %       with L to R collaterals             Interstitial lung disease ([facility] (2/28/2011)      Pulmonary HTN (HCC) (3/21/2012)      HTN (hypertension) (10/1/2012)      Morbid obesity (10/1/2012)      Chronic diastolic heart failure ([facility] (10/5/2012)      Normocytic anemia (5/26/2013)      DM (diabetes mellitus), type 2 with renal complications ([facility] (7/4/6908)      DVT (deep venous thrombosis) ([facility]) (2/2/2017)      CKD (chronic kidney disease) stage 4, GFR 15-29 ml/min ([facility] (2/10/2017)         Resolved Problems:  Problem List as of 2/10/2017  Date Reviewed: 2/8/2017          Codes Class Noted - Resolved    CKD (chronic kidney disease) stage 4, GFR 15-29 ml/min ([facility]) (Chronic) ICD-10-CM: N18.4  ICD-9-CM: 585.4  2/10/2017 - Present        * (Principal)Acute and chronic respiratory failure with hypoxia ([facility] ICD-10-CM: Y36.07  ICD-9-CM: 518.84, 799.02  2/10/2017 - Present        DVT (deep venous thrombosis) ([facility] ICD-10-CM: I82.409  ICD-9-CM: 453.40  2/2/2017 - Present        DM (diabetes mellitus), type 2 with renal complications (HCC) (Chronic) ICD-10-CM: E11.29  ICD-9-CM: 250.40  8/9/2013 - Present        Vitamin D deficiency ICD-10-CM: E55.9  ICD-9-CM: 268.9  8/9/2013 - Present        Angina, class III ([facility] ICD-10-CM: I20.9  ICD-9-CM: 413.9  8/9/2013 - Present        Normocytic anemia (Chronic) ICD-10-CM: D64.9  ICD-9-CM: 285.9  5/26/2013 - Present        DJD (degenerative joint disease) of knee ICD-10-CM: M17.9  ICD-9-CM: 715.36  10/30/2012 - Present        Chronic diastolic heart failure (HCC) (Chronic) ICD-10-CM: I50.32  ICD-9-CM: 428.32  10/5/2012 - Present        HTN (hypertension) (Chronic) ICD-10-CM: I10  ICD-9-CM: 401.9  10/1/2012 - Present        Morbid obesity (Chronic) ICD-10-CM: E66.01  ICD-9-CM: 278.01  10/1/2012 - Present        Esophageal reflux ICD-10-CM: K21.9  ICD-9-CM: 530.81  10/1/2012 - Present        Gastroparesis ICD-10-CM: K31.84  ICD-9-CM: 536.3  10/1/2012 - Present        Pulmonary HTN (HCC) (Chronic) ICD-10-CM: I27.2  ICD-9-CM: 416.8  3/21/2012 - Present        Interstitial lung disease ([facility] (Chronic) ICD-10-CM: J84.9  ICD-9-CM: 953  2/28/2011 - Present        CAD (coronary Artery Disease) Native Artery (Chronic) ICD-10-CM: I25.10  ICD-9-CM: 414.01  2/16/2011 - Present    Overview Addendum 10/5/2012  7:33 AM by PRASHANT Espino 2004  1v CAD by cath - PCI OM with SAL  Cath 5/2004 - patent stent, no new disease  Lexiscan cardiolite 12/09- normal perfusion, EF 66%  Cath: 3/19/12: PA 55/30 mean 41, wedge 26, RA 24, EDP 35, LVG 55%, LM normal, LAD normal, LCX - OM1 patent stent, OM2 prox 85% long, % with L to R collaterals                JASEN on CPAP (Chronic) ICD-10-CM: G47.33  ICD-9-CM: 327.23  2/16/2011 - Present    Overview Signed 3/21/2012  8:04 AM by PRASHANT Espino Dr. CPAP             RESOLVED: Chest pain ICD-10-CM: R07.9  ICD-9-CM: 786.50  11/9/2014 - 11/18/2014        RESOLVED: Wheezing ICD-10-CM: R06.2  ICD-9-CM: 786.07  11/9/2014 - 11/18/2014        RESOLVED: Dyspnea ICD-10-CM: R06.00  ICD-9-CM: 786.09 11/9/2014 - 11/18/2014        RESOLVED: Tobacco abuse ICD-10-CM: Z72.0  ICD-9-CM: 305.1  11/9/2014 - 6/25/2015        RESOLVED: SIRS (systemic inflammatory response syndrome) ([facility] ICD-10-CM: R65.10  ICD-9-CM: 995.90  11/9/2014 - 11/18/2014        RESOLVED: Bacteremia ICD-10-CM: R78.81  ICD-9-CM: 790.7  10/14/2013 - 2/22/2014        RESOLVED: Acute pancreatitis ICD-10-CM: K85.90  ICD-9-CM: 577.0  10/10/2013 - 2/22/2014        RESOLVED: Abdominal pain ICD-10-CM: R10.9  ICD-9-CM: 789.00  10/10/2013 - 2/22/2014        RESOLVED: Pneumonia, organism unspecified ICD-10-CM: J18.9  ICD-9-CM: 486  10/10/2013 - 2/22/2014        RESOLVED: Acute respiratory failure ([facility] ICD-10-CM: J96.00  ICD-9-CM: 518.81  7/12/2013 - 7/17/2013        RESOLVED: Pneumonia, organism unspecified ICD-10-CM: J18.9  ICD-9-CM: 486  7/12/2013 - 7/17/2013        RESOLVED: Chest pain, unspecified ICD-10-CM: R07.9  ICD-9-CM: 786.50  7/12/2013 - 7/17/2013        RESOLVED: Pneumonia, organism unspecified ICD-10-CM: J18.9  ICD-9-CM: 486  10/1/2012 - 10/30/2012        RESOLVED: JASEN (obstructive sleep apnea) ICD-10-CM: G47.33  ICD-9-CM: 327.23  10/1/2012 - 10/14/2016        RESOLVED: Leukocytosis, unspecified ICD-10-CM: J82.504  ICD-9-CM: 288.60  10/1/2012 - 10/30/2012        RESOLVED: Anemia, unspecified ICD-10-CM: D64.9  ICD-9-CM: 285.9  10/1/2012 - 10/30/2012        RESOLVED: ARF (acute renal failure) ICD-10-CM: N17.9  ICD-9-CM: 584.9  10/1/2012 - 10/30/2012        RESOLVED: Pulmonary edema ICD-10-CM: J81.1  ICD-9-CM: 790  10/1/2012 - 10/30/2012        RESOLVED: Tobacco use disorder (Chronic) ICD-10-CM: R99.830  ICD-9-CM: 305.1  3/21/2012 - 6/25/2015                Hospital Course: This is an interim summary and covers the hospitalization from 2/9/2017 to 2/10/2017. For any preceding portion of the patient's hospitalization, please see my partner's notes. Ms. Sarah Bryant remained in the hospital until her home oxygen could be repaired.   She was discharged home on 2/10/2017 in improved condition. PCP: None    Consults: Cardiology, Pulmonary/Intensive care and Nephrology    Discharge Exam:  See my Progress Note from today. Disposition: home    Patient Instructions:   Current Discharge Medication List      START taking these medications    Details   chlorpheniramine-HYDROcodone (TUSSIONEX) 10-8 mg/5 mL suspension Take 5 mL by mouth every twelve (12) hours. Max Daily Amount: 10 mL. Qty: 115 mL, Refills: 0      linaclotide (LINZESS) 290 mcg cap capsule Take 1 Cap by mouth Daily (before breakfast) for 10 days. Qty: 10 Cap, Refills: 0      polyethylene glycol (MIRALAX) 17 gram packet Take 1 Packet by mouth daily. Qty: 14 Packet, Refills: 0      senna-docusate (PERICOLACE) 8.6-50 mg per tablet Take 2 Tabs by mouth daily. Qty: 28 Tab, Refills: 0      warfarin (COUMADIN) 4 mg tablet Take 1 Tab by mouth daily. Qty: 15 Tab, Refills: 0         CONTINUE these medications which have CHANGED    Details   bumetanide (BUMEX) 1 mg tablet Take 1 Tab by mouth two (2) times a day. Qty: 60 Tab, Refills: 0    Associated Diagnoses: Chronic diastolic heart failure (HCC)      predniSONE (DELTASONE) 10 mg tablet Take 30mg for three days then 20mg for three days then 10mg for three days  Qty: 18 Tab, Refills: 0         CONTINUE these medications which have NOT CHANGED    Details   albuterol-ipratropium (DUONEB) 2.5 mg-0.5 mg/3 ml nebu 3 mL by Nebulization route every four (4) hours. sucralfate (CARAFATE) 1 gram tablet Take 1 g by mouth Before breakfast, lunch, dinner and at bedtime. fluticasone (FLONASE) 50 mcg/actuation nasal spray 2 Sprays by Both Nostrils route nightly. mupirocin (BACTROBAN) 2 % ointment Apply  to affected area two (2) times daily as needed (lesions on feet when needed).  Ointment external two times daily to lesions on feet until healed - now using as needed      temazepam (RESTORIL) 30 mg capsule Take 30 mg by mouth nightly as needed for Sleep.      albuterol (PROVENTIL HFA, VENTOLIN HFA, PROAIR HFA) 90 mcg/actuation inhaler Take 2 Puffs by inhalation every four (4) hours as needed for Wheezing. calcitRIOL (ROCALTROL) 0.25 mcg capsule Take 0.25 mcg by mouth two (2) days a week. Patient takes on Monday and Thursday      benzonatate (TESSALON PERLES) 100 mg capsule Take 100 mg by mouth three (3) times daily as needed for Cough. isosorbide mononitrate ER (IMDUR) 120 mg CR tablet TAKE 1 TABLET BY MOUTH EVERY DAY  Qty: 30 Tab, Refills: 5      carvedilol (COREG) 25 mg tablet TAKE 1 TABLET BY MOUTH TWICE A DAY  Qty: 180 Tab, Refills: 3      pantoprazole (PROTONIX) 40 mg tablet Take 1 Tab by mouth daily. Indications: GASTROESOPHAGEAL REFLUX  Qty: 90 Tab, Refills: 1      nitroglycerin (NITROSTAT) 0.3 mg SL tablet 1 Tab by SubLINGual route every five (5) minutes as needed for Chest Pain. Qty: 1 Bottle, Refills: 1    Associated Diagnoses: Atherosclerosis of native coronary artery of native heart with stable angina pectoris ([facility] Angina, class III (HCC)      INSULIN LISPRO (HUMALOG SC) by SubCUTAneous route Before breakfast, lunch, and dinner. Sliding scale. oxyCODONE-acetaminophen (PERCOCET 7.5) 7.5-325 mg per tablet Take 1 Tab by mouth every four (4) hours as needed. Refills: 0      amLODIPine (NORVASC) 10 mg tablet Take 10 mg by mouth daily. ergocalciferol (VITAMIN D2) 50,000 unit capsule Take 50,000 Units by mouth every Tuesday and Thursday. aspirin 81 mg tablet Take 81 mg by mouth daily. atorvastatin (LIPITOR) 80 mg tablet Take 80 mg by mouth every evening.            STOP taking these medications       amoxicillin-clavulanate (AUGMENTIN) 875-125 mg per tablet Comments:   Reason for Stopping:         guaiFENesin-codeine (CHERATUSSIN AC) 100-10 mg/5 mL solution Comments:   Reason for Stopping:         losartan (COZAAR) 25 mg tablet Comments:   Reason for Stopping:              Activity: Activity as tolerated  Diet: Diabetic Diet  Wound Care: None needed    Follow-up Information     Follow up With Details Comments Contact Car Ferro., MD In 1 week  [address] [facility]      Fitz Abdalla MD In 1 week  1988 [phone]      [address] is to replace you Rollator at home. [phone]          35 minutes were spend on this discharge.     Signed:  Casie Ortiz MD  2/10/2017  12:04 PM

## 2017-02-10 NOTE — INTERDISCIPLINARY ROUNDS
Interdisciplinary team rounds were held 2/10/2017 with the following team members:Care Management, Hospice, Nursing, Nutrition, Pharmacy, Physical Therapy and Physician. Plan of care discussed. See clinical pathway and/or care plan for interventions and desired outcomes.     Anticipated date of discharge  2-10-17

## 2017-02-10 NOTE — PROGRESS NOTES
Bedside and Verbal shift change report given to Solis Melendez and yS Chapa (oncoming nurse) by Trudy Ruiz (offgoing nurse). Report included the following information SBAR, Kardex, Intake/Output, MAR and Recent Results.

## 2017-02-10 NOTE — PROGRESS NOTES
[facility]io [facility] [address]  [address], [place], [address] [facility]  [phone]      Medical Progress Note      NAME: Saqib Singer   :  1955  MRM:  [id number]    Date/Time: 2/10/2017  11:12 AM         Subjective:     Chief Complaint:  SOB: moderate, constant, about the same as it chronically is, not much improvement overall    ROS:  (bold if positive, if negative)                        Tolerating PT  Tolerating Diet          Objective:       Vitals:          Last 24hrs VS reviewed since prior progress note.  Most recent are:    Visit Vitals    /67 (BP 1 Location: Left arm, BP Patient Position: At rest)    Pulse 72    Temp 98.2 °F (36.8 °C)    Resp 18    Ht 5' 10\" (1.778 m)    Wt (!) 160.6 kg (354 lb 0.9 oz)    SpO2 99%    Breastfeeding No    BMI 50.8 kg/m2     SpO2 Readings from Last 6 Encounters:   02/10/17 99%   17 98%   10/14/16 98%   16 94%   16 97%   16 100%    O2 Flow Rate (L/min): 2 l/min   No intake or output data in the 24 hours ending 02/10/17 1112       Exam:     Physical Exam:    Gen:  Well-developed, morbidly obese, in no acute distress  HEENT:  Pink conjunctivae, PERRL, hearing intact to voice, moist mucous membranes  Neck:  Supple, without masses, thyroid non-tender  Resp:  No accessory muscle use, rales bilaterally  Card:  No murmurs, normal S1, S2 without thrills, bruits or peripheral edema  Abd:  Soft, non-tender, non-distended, normoactive bowel sounds are present, no palpable organomegaly and no detectable hernias  Lymph:  No cervical or inguinal adenopathy  Musc:  No cyanosis or clubbing  Skin:  No rashes or ulcers, skin turgor is good  Neuro:  Cranial nerves are grossly intact, no focal motor weakness, follows commands appropriately  Psych:  Good insight, oriented to person, place and time, alert       Medications Reviewed: (see below)    Lab Data Reviewed: (see below)    ______________________________________________________________________    Medications:     Current Facility-Administered Medications   Medication Dose Route Frequency    warfarin (COUMADIN) tablet 3 mg  3 mg Oral ONCE    insulin NPH (NOVOLIN N, HUMULIN N) injection 65 Units  65 Units SubCUTAneous ACB&D    predniSONE (DELTASONE) tablet 30 mg  30 mg Oral DAILY WITH BREAKFAST    [START ON 2/12/2017] predniSONE (DELTASONE) tablet 20 mg  20 mg Oral DAILY WITH BREAKFAST    [START ON 2/15/2017] predniSONE (DELTASONE) tablet 10 mg  10 mg Oral DAILY WITH BREAKFAST    chlorpheniramine-HYDROcodone (TUSSIONEX) oral suspension 5 mL  5 mL Oral Q12H    linaclotide (LINZESS) capsule 290 mcg  290 mcg Oral ACB    nitroglycerin (NITROSTAT) tablet 0.4 mg  0.4 mg SubLINGual Q5MIN PRN    influenza vaccine 2016-17 (36mos+)(PF) (FLUZONE/FLUARIX/FLULAVAL QUAD) injection 0.5 mL  0.5 mL IntraMUSCular PRIOR TO DISCHARGE    oxyCODONE IR (ROXICODONE) tablet 10 mg  10 mg Oral Q3H PRN    polyethylene glycol (MIRALAX) packet 17 g  17 g Oral DAILY    senna-docusate (PERICOLACE) 8.6-50 mg per tablet 2 Tab  2 Tab Oral DAILY    sodium chloride (NS) flush 5-10 mL  5-10 mL IntraVENous Q8H    sodium chloride (NS) flush 5-10 mL  5-10 mL IntraVENous PRN    naloxone (NARCAN) injection 0.4 mg  0.4 mg IntraVENous PRN    ondansetron (ZOFRAN) injection 4 mg  4 mg IntraVENous Q4H PRN    glucose chewable tablet 16 g  4 Tab Oral PRN    dextrose (D50W) injection syrg 12.5-25 g  12.5-25 g IntraVENous PRN    glucagon (GLUCAGEN) injection 1 mg  1 mg IntraMUSCular PRN    insulin lispro (HUMALOG) injection   SubCUTAneous AC&HS    isosorbide mononitrate ER (IMDUR) tablet 120 mg  120 mg Oral DAILY    carvedilol (COREG) tablet 25 mg  25 mg Oral BID WITH MEALS    pantoprazole (PROTONIX) tablet 40 mg  40 mg Oral DAILY    amLODIPine (NORVASC) tablet 10 mg  10 mg Oral DAILY    atorvastatin (LIPITOR) tablet 80 mg  80 mg Oral QPM    Warfarin pharmacy to dose   Other Rx Dosing/Monitoring    albuterol-ipratropium (DUO-NEB) 2.5 MG-0.5 MG/3 ML  3 mL Nebulization Q6H RT    benzonatate (TESSALON) capsule 100 mg  100 mg Oral TID PRN    fluticasone (FLONASE) 50 mcg/actuation nasal spray 2 Spray  2 Spray Both Nostrils QHS    temazepam (RESTORIL) capsule 30 mg  30 mg Oral QHS PRN    sucralfate (CARAFATE) tablet 1 g  1 g Oral AC&HS            Lab Review:     No results for input(s): WBC, HGB, HCT, PLT, HGBEXT, HCTEXT, PLTEXT in the last 72 hours. Recent Labs      02/10/17   0502  02/09/17   0335  02/08/17   0700   NA  136  136  135*   K  4.2  3.9  4.6   CL  105  103  103   CO2  22  20*  22   GLU  121*  267*  329*   BUN  93*  93*  102*   CREA  4.15*  4.06*  3.91*   CA  8.5  8.4*  8.1*   MG   --    --   1.8   PHOS  4.6  3.7  3.4   ALB  3.0*  2.9*  2.7*   INR  3.0*  3.7*  3.3*     Lab Results   Component Value Date/Time    Glucose (POC) 131 02/10/2017 07:28 AM    Glucose (POC) 188 02/09/2017 09:18 PM    Glucose (POC) 160 02/09/2017 04:07 PM    Glucose (POC) 237 02/09/2017 11:13 AM    Glucose (POC) 235 02/09/2017 07:30 AM    Glucose (POC) 170 07/21/2013 09:51 PM    Glucose (POC) 153 07/11/2013 11:00 PM    Glucose (POC) 105 05/23/2013 10:57 PM     No results for input(s): PH, PCO2, PO2, HCO3, FIO2 in the last 72 hours. Recent Labs      02/10/17   0502  02/09/17   0335  02/08/17   0700   INR  3.0*  3.7*  3.3*     Lab Results   Component Value Date/Time    Specimen Description: BLOOD 10/14/2013 03:37 AM    Specimen Description: BLOOD 10/11/2013 12:03 AM    Specimen Description: NARES 07/12/2013 04:50 AM     Lab Results   Component Value Date/Time    Culture result: HEAVY  NORMAL RESPIRATORY WAGNER   02/03/2017 04:10 PM    Culture result: MODERATE  CANDIDA TROPICALIS   02/03/2017 04:10 PM    Culture result:  02/03/2017 04:10 PM     CULTURE WILL BE HELD FOR 4 WEEKS. IF THERE IS ADDITIONAL FUNGAL GROWTH, A NEW REPORT WILL FOLLOW.             Assessment:     Principal Problem:    Acute and chronic respiratory failure with hypoxia ([address] (2/10/2017)    Active Problems:    CAD (coronary Artery Disease) Native Artery (2/16/2011)      Overview: [place] 2004      1v CAD by cath - PCI OM with SAL      Cath 5/2004 - patent stent, no new disease      Lexiscan cardiolite 12/09- normal perfusion, EF 66%      Cath: 3/19/12: PA 55/30 mean 41, wedge 26, RA 24, EDP 35, LVG 55%, LM       normal, LAD normal, LCX - OM1 patent stent, OM2 prox 85% long, %       with L to R collaterals             Interstitial lung disease ([address] (2/28/2011)      Pulmonary HTN ([address] (3/21/2012)      HTN (hypertension) (10/1/2012)      Morbid obesity (10/1/2012)      Chronic diastolic heart failure ([address] (10/5/2012)      Normocytic anemia (5/26/2013)      DM (diabetes mellitus), type 2 with renal complications ([address] (7/0/7944)      DVT (deep venous thrombosis) ([address] (2/2/2017)      CKD (chronic kidney disease) stage 4, GFR 15-29 ml/min ([facility]) (2/10/2017)           Plan:     Principal Problem:    Acute and chronic respiratory failure with hypoxia ([address] (2/10/2017)   - likely progression of her underlying disease   - home on oxygen    Active Problems:    CAD (coronary Artery Disease) Native Artery/ (6/84/2995)CGBETVD diastolic heart failure ([address] (10/5/2012)   - continue cardiac meds          Interstitial lung disease ([address] (2/28/2011)/Pulmonary HTN ([address] (3/21/2012)   - suspect this is the cause of her decompensation   - nothing more to do for this at this time per Pulmonary      HTN (hypertension) (10/1/2012)   - BP okay on meds as above    Morbid obesity (10/1/2012)   - counseled on weight loss        Normocytic anemia (5/26/2013)   - Hgb stable      DM (diabetes mellitus), type 2 with renal complications ([address] (2/1/8460)   - BS sebas      DVT (deep venous thrombosis) ([facility] (2/2/2017)   - continue anticoagulation with warfarin      CKD (chronic kidney disease) stage 4, GFR 15-29 ml/min ([facility] (2/10/2017)   - creatinine at baseline, outpatient follow up with Renal      Total time spent with patient: 35 minutes                  Care Plan discussed with: Patient, Family, Care Manager and Nursing Staff    Discussed:  Code Status, Care Plan and D/C Planning    Prophylaxis:  Coumadin    Disposition:  HH PT, OT, RN           ___________________________________________________    Attending Physician: Estanislado Sandhoff, MD

## 2017-02-10 NOTE — PROGRESS NOTES
[facility] Dosing Services: 2/10/17    Consult for Warfarin Dosing by Pharmacy by Dr. Rivas Thapa provided for this 63 y.o.  female , for indication of Acute DVT  Day of Therapy 9  Dose to achieve an INR goal of 2-3    Order entered for  Warfarin  3 (mg) ordered to be given today at 18:00. Patient has received 22mg over 7 days averaging ~3mg/day    Significant drug interactions: Clopidogrel  Previous dose given Held 2/9/17   PT/INR Lab Results   Component Value Date/Time    INR 3.0 02/10/2017 05:02 AM      Platelets Lab Results   Component Value Date/Time    PLATELET 933 25/63/3278 01:37 PM      H/H Lab Results   Component Value Date/Time    HGB 7.8 02/06/2017 01:37 PM        Pharmacy to follow daily and will provide subsequent Warfarin dosing based on clinical status.     Sergo ChristiansonD, BCPS  Contact information

## 2017-02-10 NOTE — PROGRESS NOTES
[address] [facility] Bishop Sands  YOB: 1955          Assessment & Plan:   1. ROSEANN on CKD 4  · Cr stabilizing at new level  · ROSEANN Due to IVVF ( Poor PO, 2 Diuretics) and/or Bactrim: Pre renal to ATN  · Cr not better despite being off ARB and diuretics  · She is on Steroids so role of biopsy to diagnose AIN is mute:dw pt  · Resume  Loops today  · BUN is up due to Steroids and IVVD, Also ROSEANN/CKD  · No accurate Wt or I/Os, some Nausea and Metallic taste,if not better, may need HD  2. CKD 4  · Cr 2.7-2/8 mg% at baseline, , Diabetic and Hypertensive Nephrosclerosis  · Hold ARB  3. DM  · Insulin  4. HTN  · BB,CCB,Thiazide,  · ARB n Hold  5. Resp Failure  · I think this is due to Exacerbation of ILD and ? PE  · Now she is gaining wt and fluid per pt: resume loops  6. Obesity  · Wt loss will help  7. Smoker  · None now            8. Chronic Diastolic CHF  ·          Subjective:   CC:ARF  HPI: Patient seen   ROSEANN is milly but some nausea and poor taste  She is off ARB  Edema is worse  ?  Wt gain  SOB   +     ROS:as above otherwise neg for 12 sys  Current Facility-Administered Medications   Medication Dose Route Frequency    warfarin (COUMADIN) tablet 3 mg  3 mg Oral ONCE    bumetanide (BUMEX) tablet 1 mg  1 mg Oral BID    insulin NPH (NOVOLIN N, HUMULIN N) injection 65 Units  65 Units SubCUTAneous ACB&D    predniSONE (DELTASONE) tablet 30 mg  30 mg Oral DAILY WITH BREAKFAST    [START ON 2/12/2017] predniSONE (DELTASONE) tablet 20 mg  20 mg Oral DAILY WITH BREAKFAST    [START ON 2/15/2017] predniSONE (DELTASONE) tablet 10 mg  10 mg Oral DAILY WITH BREAKFAST    chlorpheniramine-HYDROcodone (TUSSIONEX) oral suspension 5 mL  5 mL Oral Q12H    linaclotide (LINZESS) capsule 290 mcg  290 mcg Oral ACB    nitroglycerin (NITROSTAT) tablet 0.4 mg  0.4 mg SubLINGual Q5MIN PRN    influenza vaccine 2016-17 (36mos+)(PF) (FLUZONE/FLUARIX/FLULAVAL QUAD) injection 0.5 mL  0.5 mL IntraMUSCular PRIOR TO DISCHARGE    oxyCODONE IR (ROXICODONE) tablet 10 mg  10 mg Oral Q3H PRN    polyethylene glycol (MIRALAX) packet 17 g  17 g Oral DAILY    senna-docusate (PERICOLACE) 8.6-50 mg per tablet 2 Tab  2 Tab Oral DAILY    sodium chloride (NS) flush 5-10 mL  5-10 mL IntraVENous Q8H    sodium chloride (NS) flush 5-10 mL  5-10 mL IntraVENous PRN    naloxone (NARCAN) injection 0.4 mg  0.4 mg IntraVENous PRN    ondansetron (ZOFRAN) injection 4 mg  4 mg IntraVENous Q4H PRN    glucose chewable tablet 16 g  4 Tab Oral PRN    dextrose (D50W) injection syrg 12.5-25 g  12.5-25 g IntraVENous PRN    glucagon (GLUCAGEN) injection 1 mg  1 mg IntraMUSCular PRN    insulin lispro (HUMALOG) injection   SubCUTAneous AC&HS    isosorbide mononitrate ER (IMDUR) tablet 120 mg  120 mg Oral DAILY    carvedilol (COREG) tablet 25 mg  25 mg Oral BID WITH MEALS    pantoprazole (PROTONIX) tablet 40 mg  40 mg Oral DAILY    amLODIPine (NORVASC) tablet 10 mg  10 mg Oral DAILY    atorvastatin (LIPITOR) tablet 80 mg  80 mg Oral QPM    Warfarin pharmacy to dose   Other Rx Dosing/Monitoring    albuterol-ipratropium (DUO-NEB) 2.5 MG-0.5 MG/3 ML  3 mL Nebulization Q6H RT    benzonatate (TESSALON) capsule 100 mg  100 mg Oral TID PRN    fluticasone (FLONASE) 50 mcg/actuation nasal spray 2 Spray  2 Spray Both Nostrils QHS    temazepam (RESTORIL) capsule 30 mg  30 mg Oral QHS PRN    sucralfate (CARAFATE) tablet 1 g  1 g Oral AC&HS          Objective:     Vitals:  Blood pressure 133/67, pulse 72, temperature 98.2 °F (36.8 °C), resp. rate 18, height 5' 10\" (1.778 m), weight (!) 160.6 kg (354 lb 0.9 oz), SpO2 99 %, not currently breastfeeding.   Temp (24hrs), Av.1 °F (36.7 °C), Min:97.5 °F (36.4 °C), Max:98.4 °F (36.9 °C)      Intake and Output:          Physical Exam:                Patient is intubated:  no    Physical Examination:   GENERAL ASSESSMENT: NAD  HEENT:Nontraumatic   CHEST: Crackles  HEART: S1S2  ABDOMEN: Soft,NT,  :Myers: n  EXTREMITY: EDEMA 2   NEURO:Grossly non focal          ECG/rhythm:    Data Review      No results for input(s): TNIPOC in the last 72 hours. No lab exists for component: ITNL   No results for input(s): CPK, CKMB, TROIQ in the last 72 hours. Recent Labs      02/10/17   0502  02/09/17   0335  02/08/17   0700   NA  136  136  135*   K  4.2  3.9  4.6   CL  105  103  103   CO2  22  20*  22   BUN  93*  93*  102*   CREA  4.15*  4.06*  3.91*   GLU  121*  267*  329*   PHOS  4.6  3.7  3.4   MG   --    --   1.8   CA  8.5  8.4*  8.1*   ALB  3.0*  2.9*  2.7*      Recent Labs      02/10/17   0502  02/09/17   0335  02/08/17   0700   INR  3.0*  3.7*  3.3*   PTP  31.4*  39.3*  34.6*     Needs: urine analysis, urine sodium, protein and creatinine  Lab Results   Component Value Date/Time    Sodium urine, random 25 11/10/2014 12:40 PM    Creatinine, urine 115.07 02/04/2017 09:33 AM         Discussed with:  pt    : Giuliana Dutta MD  2/10/2017        [facility]:  [email or web address]  Mariela Lechuga office:  [address] [place] [address] [address]  [address]  Phone: [phone]  Fax :     [phone]    [place] office:  [address] [facility]  [facility]  Phone - [phone]  Fax - [phone]

## 2017-02-10 NOTE — ROUTINE PROCESS
Bedside and Verbal shift change report given to Arturo Macnilla RN (oncoming nurse) by Marielos Condon RN (offgoing nurse). Report included the following information SBAR, Kardex, MAR, Accordion and Recent Results.

## 2017-02-11 ENCOUNTER — HOSPITAL ENCOUNTER (EMERGENCY)
Age: 62
Discharge: HOME | End: 2017-02-11
Attending: EMERGENCY MEDICINE
Payer: MEDICARE

## 2017-02-11 VITALS
RESPIRATION RATE: 10 BRPM | BODY MASS INDEX: 41.95 KG/M2 | HEIGHT: 70 IN | DIASTOLIC BLOOD PRESSURE: 86 MMHG | HEART RATE: 53 BPM | SYSTOLIC BLOOD PRESSURE: 162 MMHG | OXYGEN SATURATION: 97 % | WEIGHT: 293 LBS

## 2017-02-11 DIAGNOSIS — E16.2 HYPOGLYCEMIA: Primary | ICD-10-CM

## 2017-02-11 DIAGNOSIS — N18.4 CKD (CHRONIC KIDNEY DISEASE) STAGE 4, GFR 15-29 ML/MIN (HCC): Chronic | ICD-10-CM

## 2017-02-11 DIAGNOSIS — E11.22 TYPE 2 DIABETES MELLITUS WITH STAGE 4 CHRONIC KIDNEY DISEASE, UNSPECIFIED LONG TERM INSULIN USE STATUS: ICD-10-CM

## 2017-02-11 DIAGNOSIS — N18.4 TYPE 2 DIABETES MELLITUS WITH STAGE 4 CHRONIC KIDNEY DISEASE, UNSPECIFIED LONG TERM INSULIN USE STATUS: ICD-10-CM

## 2017-02-11 LAB
ALBUMIN SERPL BCP-MCNC: 3 G/DL (ref 3.5–5)
ALBUMIN/GLOB SERPL: 0.7 {RATIO} (ref 1.1–2.2)
ALP SERPL-CCNC: 68 U/L (ref 45–117)
ALT SERPL-CCNC: 21 U/L (ref 12–78)
ANION GAP BLD CALC-SCNC: 11 MMOL/L (ref 5–15)
AST SERPL W P-5'-P-CCNC: 23 U/L (ref 15–37)
BASOPHILS # BLD AUTO: 0 K/UL (ref 0–0.1)
BASOPHILS # BLD: 0 % (ref 0–1)
BILIRUB SERPL-MCNC: 0.4 MG/DL (ref 0.2–1)
BUN SERPL-MCNC: 90 MG/DL (ref 6–20)
BUN/CREAT SERPL: 23 (ref 12–20)
CALCIUM SERPL-MCNC: 8.6 MG/DL (ref 8.5–10.1)
CHLORIDE SERPL-SCNC: 106 MMOL/L (ref 97–108)
CO2 SERPL-SCNC: 23 MMOL/L (ref 21–32)
CREAT SERPL-MCNC: 3.92 MG/DL (ref 0.55–1.02)
EOSINOPHIL # BLD: 0.1 K/UL (ref 0–0.4)
EOSINOPHIL NFR BLD: 0 % (ref 0–7)
ERYTHROCYTE [DISTWIDTH] IN BLOOD BY AUTOMATED COUNT: 15.5 % (ref 11.5–14.5)
GLOBULIN SER CALC-MCNC: 4.1 G/DL (ref 2–4)
GLUCOSE BLD STRIP.AUTO-MCNC: 142 MG/DL (ref 65–100)
GLUCOSE BLD STRIP.AUTO-MCNC: 62 MG/DL (ref 65–100)
GLUCOSE BLD STRIP.AUTO-MCNC: 66 MG/DL (ref 65–100)
GLUCOSE SERPL-MCNC: 64 MG/DL (ref 65–100)
HCT VFR BLD AUTO: 29.2 % (ref 35–47)
HGB BLD-MCNC: 9.4 G/DL (ref 11.5–16)
INR PPP: 2.3 (ref 0.9–1.1)
LYMPHOCYTES # BLD AUTO: 10 % (ref 12–49)
LYMPHOCYTES # BLD: 2 K/UL (ref 0.8–3.5)
MCH RBC QN AUTO: 29.8 PG (ref 26–34)
MCHC RBC AUTO-ENTMCNC: 32.2 G/DL (ref 30–36.5)
MCV RBC AUTO: 92.7 FL (ref 80–99)
MONOCYTES # BLD: 1.4 K/UL (ref 0–1)
MONOCYTES NFR BLD AUTO: 7 % (ref 5–13)
NEUTS SEG # BLD: 16.5 K/UL (ref 1.8–8)
NEUTS SEG NFR BLD AUTO: 83 % (ref 32–75)
PLATELET # BLD AUTO: 304 K/UL (ref 150–400)
POTASSIUM SERPL-SCNC: 4.1 MMOL/L (ref 3.5–5.1)
PROT SERPL-MCNC: 7.1 G/DL (ref 6.4–8.2)
PROTHROMBIN TIME: 24 SEC (ref 9–11.1)
RBC # BLD AUTO: 3.15 M/UL (ref 3.8–5.2)
SERVICE CMNT-IMP: ABNORMAL
SERVICE CMNT-IMP: ABNORMAL
SERVICE CMNT-IMP: NORMAL
SODIUM SERPL-SCNC: 140 MMOL/L (ref 136–145)
TROPONIN I SERPL-MCNC: <0.04 NG/ML
WBC # BLD AUTO: 19.9 K/UL (ref 3.6–11)

## 2017-02-11 PROCEDURE — 96375 TX/PRO/DX INJ NEW DRUG ADDON: CPT

## 2017-02-11 PROCEDURE — 99285 EMERGENCY DEPT VISIT HI MDM: CPT

## 2017-02-11 PROCEDURE — 85025 COMPLETE CBC W/AUTO DIFF WBC: CPT | Performed by: EMERGENCY MEDICINE

## 2017-02-11 PROCEDURE — 84484 ASSAY OF TROPONIN QUANT: CPT | Performed by: EMERGENCY MEDICINE

## 2017-02-11 PROCEDURE — 85610 PROTHROMBIN TIME: CPT | Performed by: EMERGENCY MEDICINE

## 2017-02-11 PROCEDURE — 80053 COMPREHEN METABOLIC PANEL: CPT | Performed by: EMERGENCY MEDICINE

## 2017-02-11 PROCEDURE — 74011000250 HC RX REV CODE- 250: Performed by: EMERGENCY MEDICINE

## 2017-02-11 PROCEDURE — 82962 GLUCOSE BLOOD TEST: CPT

## 2017-02-11 PROCEDURE — 74011250636 HC RX REV CODE- 250/636: Performed by: EMERGENCY MEDICINE

## 2017-02-11 PROCEDURE — 36415 COLL VENOUS BLD VENIPUNCTURE: CPT | Performed by: EMERGENCY MEDICINE

## 2017-02-11 PROCEDURE — 96374 THER/PROPH/DIAG INJ IV PUSH: CPT

## 2017-02-11 RX ORDER — ONDANSETRON 4 MG/1
4 TABLET, ORALLY DISINTEGRATING ORAL
Qty: 30 TAB | Refills: 0 | Status: SHIPPED | OUTPATIENT
Start: 2017-02-11 | End: 2018-05-30

## 2017-02-11 RX ORDER — DEXTROSE 50 % IN WATER (D50W) INTRAVENOUS SYRINGE
25
Status: COMPLETED | OUTPATIENT
Start: 2017-02-11 | End: 2017-02-11

## 2017-02-11 RX ORDER — ONDANSETRON 2 MG/ML
4 INJECTION INTRAMUSCULAR; INTRAVENOUS
Status: COMPLETED | OUTPATIENT
Start: 2017-02-11 | End: 2017-02-11

## 2017-02-11 RX ADMIN — DEXTROSE MONOHYDRATE 12.5 G: 25 INJECTION, SOLUTION INTRAVENOUS at 11:24

## 2017-02-11 RX ADMIN — ONDANSETRON 4 MG: 2 INJECTION INTRAMUSCULAR; INTRAVENOUS at 11:22

## 2017-02-11 NOTE — ED TRIAGE NOTES
called EMS for change in mental status; hx of diabetes; EMS gave 4 mg glucagon nasally and IM for BG 25; brought up to 62 per EMS.  Was unresponsive in squad for 5 minutes

## 2017-02-11 NOTE — ED PROVIDER NOTES
HPI Comments: 63 y.o. female with past medical history significant for HTN, obesity, sleep apnea, gastroparesis, OA, ILD, aortic aneurysm, diastolic HF, RA, uterine cervical CA, T2DM, G6P deficiency, GERD, esophageal stricture, CAD, class 3 angina, and stage 4 CKD who presents from home via EMS with chief complaint of hypoglycemia. Pt was brought to the ED via EMS, who were called by pt's  c/o AMS ~1 hour ago, but EMS found her BG en route was 25. EMS could not establish IV access, so pt was given 4 Glucagon, 2 intranasal and 2 intramuscular. Per EMS, pt was briefly unresponsive at one time. Pt also c/o nausea, vomiting, mild chest tightness, and dizziness. Pt was recently started on Coumadin d/t her Dx of a R-DVT, but she did not take her dose this morning. Pt is also currently taking Prednisone. Pt has no pertinent negative symptoms. There are no other acute medical concerns at this time. PCP: None    OLD CHART REVIEW: Pt was admitted to [facility] from 02/02/2017-02/10/2017 for a diagnosis of a DVT in her R-tibial vein. Note written by Julita العلي. Nida Martinez, as dictated by Charls Harada, MD 11:11 AM      The history is provided by the patient and the spouse.         Past Medical History:   Diagnosis Date     Sleep Apnea 2/16/2011    Angina, class III ([address] 8/9/2013    Aortic aneurysm ([address]     CAD (coronary Artery Disease) Native Artery 2/16/2011    CKD (chronic kidney disease) stage 4, GFR 15-29 ml/min ([address] 2/10/2017    Diabetic gastroparesis (HCC)     Diastolic heart failure ([address] 10/5/2012    Esophageal stricture 2012     dialted Dr. Nathaly Aquino G6PD deficiency ([address]      trait    GERD (gastroesophageal reflux disease)     Hypertensive Cardiovasc Dis Benign, No CHF 2/16/2011    ILD (interstitial lung disease) ([address]      open lung bx CJW 2010    OA (osteoarthritis)     Obesity 2/16/2011    Ovarian cancer ([address]      cervical and uterine    Rheumatoid arteritis ([address]     T2DM (type 2 diabetes mellitus) ([facility] 8/9/2013    Tobacco use disorder 3/21/2012    Uterine cervix cancer ([facility]     Vitamin D deficiency 8/9/2013       Past Surgical History:   Procedure Laterality Date    Hx orthopaedic  11/12/12     right knee replacement    Hx hysterectomy      Hx cholecystectomy      Hx appendectomy      Hx hernia repair      Hx carpal tunnel release       bilateral    Hx tonsil and adenoidectomy      Pr cardiac surg procedure unlist       stents    Upper gi endoscopy,dilatn w guide  6/24/2016          Colonoscopy N/A 6/24/2016     COLONOSCOPY performed by Janette Ferro MD at [facility] ENDOSCOPY         Family History:   Problem Relation Age of Onset    Heart Disease Mother     Heart Disease Brother        Social History     Social History    Marital status:      Spouse name: N/A    Number of children: N/A    Years of education: N/A     Occupational History    Not on file. Social History Main Topics    Smoking status: Former Smoker     Packs/day: 0.50     Years: 41.00     Types: Cigarettes     Quit date: 11/9/2014    Smokeless tobacco: Never Used    Alcohol use No    Drug use: No    Sexual activity: Not on file     Other Topics Concern    Not on file     Social History Narrative         ALLERGIES: Contrast dye [iodine]; Levaquin [levofloxacin]; and Morphine    Review of Systems   Constitutional: Negative for chills and fever. HENT: Negative for ear pain and sore throat. Eyes: Negative for photophobia and pain. Respiratory: Positive for chest tightness. Negative for shortness of breath. Cardiovascular: Negative for chest pain and leg swelling. Gastrointestinal: Positive for nausea and vomiting. Negative for abdominal pain. Genitourinary: Negative for dysuria and flank pain. Musculoskeletal: Negative for back pain and neck pain. Skin: Negative for rash and wound. Neurological: Positive for dizziness. Negative for light-headedness and headaches. All other systems reviewed and are negative. Vitals:    02/11/17 1112   BP: (!) 169/92   Pulse: 76   Resp: 20   SpO2: 98%   Weight: 148.3 kg (327 lb)   Height: 5' 10\" (1.778 m)            Physical Exam   Constitutional: She is oriented to person, place, and time. She appears well-developed and well-nourished. She appears distressed. HENT:   Head: Normocephalic and atraumatic. Eyes: Conjunctivae and EOM are normal. Pupils are equal, round, and reactive to light. Neck: Normal range of motion. Cardiovascular: Normal rate, regular rhythm, normal heart sounds and intact distal pulses. No murmur heard. Pulmonary/Chest: Effort normal and breath sounds normal. No stridor. No respiratory distress. Abdominal: Soft. Bowel sounds are normal. There is no tenderness. There is no rebound. +vomiting on arrival   Musculoskeletal: Normal range of motion. She exhibits no edema or tenderness. Neurological: She is alert and oriented to person, place, and time. No cranial nerve deficit. Skin: Skin is warm and dry. She is not diaphoretic. Psychiatric: She has a normal mood and affect. Nursing note and vitals reviewed. MDM  Number of Diagnoses or Management Options  CKD (chronic kidney disease) stage 4, GFR 15-29 ml/min ([facility]): Hypoglycemia:   Type 2 diabetes mellitus with stage 3 chronic kidney disease, unspecified long term insulin use status [facility]):   Diagnosis management comments: Hypoglycemia - arrives via EMS - check labs, and give meds for symptoms and re-eval.    1415 - patient improved, no EMC at this time, d/c home. Amount and/or Complexity of Data Reviewed  Clinical lab tests: ordered and reviewed      ED Course       Procedures       Procedure Note- Peripheral IV Access  2:33 PM  Performed by: Kiara Khan MD  I gained IV access using  20 gauge needle because the patient had no vascular access.   After cleaning the site with alcohol prep, a Right upper arm vein was localized with ultrasound guidance in an anterior approach. Line confirmation was obtained by direct visualization and good blood return. No anaesthetic was used. The line was successfully flushed with normal saline and was secured with transparent tape.   Estimated blood loss: 3 ml  The procedure took 1-15 minutes, and pt tolerated well.  3 attempts made and successful on last attempt

## 2017-02-11 NOTE — DISCHARGE INSTRUCTIONS
Diabetes Blood Sugar Emergencies: Your Action Plan  How can you prevent a blood sugar emergency? An important part of living with diabetes is keeping your blood sugar in your target range. You'll need to know what to do if it's too high or too low. Managing your blood sugar levels helps you avoid emergencies. This care sheet will teach you about the signs of high and low blood sugar. It will help you make an action plan with your doctor for when these signs occur. Low blood sugar is more likely to happen if you take certain medicines for diabetes. It can also happen if you skip a meal, drink alcohol, or exercise more than usual.  You may get high blood sugar if you eat differently than you normally do. One example is eating more carbohydrate than usual. Having a cold, the flu, or other sudden illness can also cause high blood sugar levels. Levels can also rise if you miss a dose of medicine. Any change in how you take your medicine may affect your blood sugar level. So it's important to work with your doctor before you make any changes. Check your blood sugar  Work with your doctor to fill in the blank spaces below that apply to you. Track your levels, know your target range, and write down ways you can get your blood sugar back in your target range. A log book can help you track your levels. Take the book to all of your medical appointments. · Check your blood sugar _____ times a day, at these times:________________________________________________. (For example: Before meals, at bedtime, before exercise, during exercise, other.)  · Your blood sugar target range before a meal is ___________________. Your blood sugar target range after a meal is _______________________. · Do this--___________________________________________________--to get your blood sugar back within your safe range if your blood sugar results are _________________________________________.  (For example: Less than 70 or above 250 mg/dL.)  Call your doctor when your blood sugar results are ___________________________________. (For example: Less than 70 or above 250 mg/dL.)  What are the symptoms of low and high blood sugar? Common symptoms of low blood sugar are sweating and feeling shaky, weak, hungry, or confused. Symptoms can start quickly. Common symptoms of high blood sugar are feeling very thirsty or very hungry. You may also pass urine more often than usual. You may have blurry vision and may lose weight without trying. But some people may have high or low blood sugar without having any symptoms. That's a good reason to check your blood sugar on a regular schedule. What should you do if you have symptoms? Work with your doctor to fill in the blank spaces below that apply to you. Low blood sugar  If you have symptoms of low blood sugar, check your blood sugar. If it's below _____ (for example, below 70), eat or drink a quick-sugar food that has about 15 grams of carbohydrate. Your goal is to get your level back to your safe range. Check your blood sugar again 15 minutes later. If it's still not in your target range, take another 15 grams of carbohydrate and check your blood sugar again in 15 minutes. Repeat this until you reach your target. Then go back to your regular testing schedule. When you have low blood sugar, it's best to stop or reduce any physical activity until your blood sugar is back in your target range and is stable. If you must stay active, eat or drink 30 grams of carbohydrate. Then check your blood sugar again in 15 minutes. If it's not in your target range, take another 30 grams of carbohydrates. Check your blood sugar again in 15 minutes. Keep doing this until you reach your target. You can then go back to your regular testing schedule. If your symptoms or blood sugar levels are getting worse or have not improved after 15 minutes, seek medical care right away.   Here are some examples of quick-sugar foods with 15 grams of carbohydrate:  · 3 or 4 glucose tablets  · 1 tube of glucose gel  · Hard candy (such as 3 Jolly Ranchers or 5 to 7 Life Savers)  · ½ cup to ¾ cup (4 to 6 ounces) of fruit juice or regular (not diet) soda  High blood sugar  If you have symptoms of high blood sugar, check your blood sugar. Your goal is to get your level back to your target range. If it's above ______ (for example, above 250), follow these steps:  · If you missed a dose of your diabetes medicine, take it now. Take only the amount of medicine that you have been prescribed. Do not take more or less medicine. · Give yourself insulin if your doctor has prescribed it for high blood sugar. · Test for ketones, if the doctor told you to do so. If the results of the ketone test show a moderate-to-large amount of ketones, call the doctor for advice. · Wait 30 minutes after you take the extra insulin or the missed medicine. Check your blood sugar again. If your symptoms or blood sugar levels are getting worse or have not improved after taking these steps, seek medical care right away. Follow-up care is a key part of your treatment and safety. Be sure to make and go to all appointments, and call your doctor if you are having problems. It's also a good idea to know your test results and keep a list of the medicines you take. Where can you learn more? Go to http://lindsay-kai.info/. Enter I880 in the search box to learn more about \"Diabetes Blood Sugar Emergencies: Your Action Plan. \"  Current as of: May 23, 2016  Content Version: 11.1  © 0546-3608 [employer]. Care instructions adapted under license by [employer] (which disclaims liability or warranty for this information). If you have questions about a medical condition or this instruction, always ask your healthcare professional. [address] any warranty or liability for your use of this information.            We hope that we have addressed all of your medical concerns. The examination and treatment you received in the Emergency Department were for an emergent problem and were not intended as complete care. It is important that you follow up with your healthcare provider(s) for ongoing care. If your symptoms worsen or do not improve as expected, and you are unable to reach your usual health care provider(s), you should return to the Emergency Department. Today's healthcare is undergoing tremendous change, and patient satisfaction surveys are one of the many tools to assess the quality of medical care. You may receive a survey from the [employer] regarding your experience in the Emergency Department. I hope that your experience has been completely positive, particularly the medical care that I provided. As such, please participate in the survey; anything less than excellent does not meet my expectations or intentions. [facility] and [address] [place] [address] participate in nationally recognized quality of care measures. If your blood pressure is greater than 120/80, as reported below, we urge that you seek medical care to address the potential of high blood pressure, commonly known as hypertension. Hypertension can be hereditary or can be caused by certain medical conditions, pain, stress, or \"white coat syndrome. \"       Please make an appointment with your health care provider(s) for follow up of your Emergency Department visit. VITALS:   Patient Vitals for the past 8 hrs:   Pulse Resp BP SpO2   02/11/17 1215 (!) 53 10 162/86 97 %   02/11/17 1201 61 14 (!) 148/94 100 %   02/11/17 1145 66 17 109/62 98 %   02/11/17 1112 76 20 (!) 169/92 98 %          Thank you for allowing us to provide you with medical care today. We realize that you have many choices for your emergency care needs. Please choose us in the future for any continued health care needs.       Regards, Denise Perez. Luis Meri, [address]   Office: [phone]            Recent Results (from the past 24 hour(s))   GLUCOSE, POC    Collection Time: 02/11/17 11:09 AM   Result Value Ref Range    Glucose (POC) 62 (L) 65 - 100 mg/dL    Performed by Monika Nuñez, POC    Collection Time: 02/11/17 11:19 AM   Result Value Ref Range    Glucose (POC) 66 65 - 100 mg/dL    Performed by Abiodun Johnston (PCT)    TROPONIN I    Collection Time: 02/11/17 11:24 AM   Result Value Ref Range    Troponin-I, Qt. <0.04 <0.05 ng/mL   CBC WITH AUTOMATED DIFF    Collection Time: 02/11/17 11:24 AM   Result Value Ref Range    WBC 19.9 (H) 3.6 - 11.0 K/uL    RBC 3.15 (L) 3.80 - 5.20 M/uL    HGB 9.4 (L) 11.5 - 16.0 g/dL    HCT 29.2 (L) 35.0 - 47.0 %    MCV 92.7 80.0 - 99.0 FL    MCH 29.8 26.0 - 34.0 PG    MCHC 32.2 30.0 - 36.5 g/dL    RDW 15.5 (H) 11.5 - 14.5 %    PLATELET 292 714 - 934 K/uL    NEUTROPHILS 83 (H) 32 - 75 %    LYMPHOCYTES 10 (L) 12 - 49 %    MONOCYTES 7 5 - 13 %    EOSINOPHILS 0 0 - 7 %    BASOPHILS 0 0 - 1 %    ABS. NEUTROPHILS 16.5 (H) 1.8 - 8.0 K/UL    ABS. LYMPHOCYTES 2.0 0.8 - 3.5 K/UL    ABS. MONOCYTES 1.4 (H) 0.0 - 1.0 K/UL    ABS. EOSINOPHILS 0.1 0.0 - 0.4 K/UL    ABS. BASOPHILS 0.0 0.0 - 0.1 K/UL   METABOLIC PANEL, COMPREHENSIVE    Collection Time: 02/11/17 11:24 AM   Result Value Ref Range    Sodium 140 136 - 145 mmol/L    Potassium 4.1 3.5 - 5.1 mmol/L    Chloride 106 97 - 108 mmol/L    CO2 23 21 - 32 mmol/L    Anion gap 11 5 - 15 mmol/L    Glucose 64 (L) 65 - 100 mg/dL    BUN 90 (H) 6 - 20 MG/DL    Creatinine 3.92 (H) 0.55 - 1.02 MG/DL    BUN/Creatinine ratio 23 (H) 12 - 20      GFR est AA 14 (L) >60 ml/min/1.73m2    GFR est non-AA 12 (L) >60 ml/min/1.73m2    Calcium 8.6 8.5 - 10.1 MG/DL    Bilirubin, total 0.4 0.2 - 1.0 MG/DL    ALT (SGPT) 21 12 - 78 U/L    AST (SGOT) 23 15 - 37 U/L    Alk.  phosphatase 68 45 - 117 U/L    Protein, total 7.1 6.4 - 8.2 g/dL    Albumin 3.0 (L) 3.5 - 5.0 g/dL    Globulin 4.1 (H) 2.0 - 4.0 g/dL    A-G Ratio 0.7 (L) 1.1 - 2.2     PROTHROMBIN TIME + INR    Collection Time: 02/11/17 11:24 AM   Result Value Ref Range    INR 2.3 (H) 0.9 - 1.1      Prothrombin time 24.0 (H) 9.0 - 11.1 sec   GLUCOSE, POC    Collection Time: 02/11/17 11:53 AM   Result Value Ref Range    Glucose (POC) 142 (H) 65 - 100 mg/dL    Performed by Mayda Evans (PCT)

## 2017-02-13 LAB
SPECIMEN SOURCE: NORMAL
VIRUS SPEC CULT: NORMAL

## 2017-02-15 LAB
L PNEUMO AG SPEC QL IF: NEGATIVE
LEGIONELLA SPEC CULT: NORMAL
LEGIONELLA SPEC CULT: NORMAL
SPECIMEN SOURCE: NORMAL

## 2017-02-16 ENCOUNTER — PATIENT OUTREACH (OUTPATIENT)
Dept: FAMILY MEDICINE CLINIC | Age: 62
End: 2017-02-16

## 2017-02-16 ENCOUNTER — HOSPITAL ENCOUNTER (INPATIENT)
Age: 62
LOS: 4 days | Discharge: HOME | DRG: 291 | End: 2017-02-20
Attending: EMERGENCY MEDICINE | Admitting: FAMILY MEDICINE
Payer: MEDICARE

## 2017-02-16 ENCOUNTER — OFFICE VISIT (OUTPATIENT)
Dept: FAMILY MEDICINE CLINIC | Age: 62
End: 2017-02-16

## 2017-02-16 ENCOUNTER — APPOINTMENT (OUTPATIENT)
Dept: GENERAL RADIOLOGY | Age: 62
DRG: 291 | End: 2017-02-16
Attending: EMERGENCY MEDICINE
Payer: MEDICARE

## 2017-02-16 VITALS
RESPIRATION RATE: 20 BRPM | BODY MASS INDEX: 41.95 KG/M2 | DIASTOLIC BLOOD PRESSURE: 82 MMHG | TEMPERATURE: 98.2 F | HEIGHT: 70 IN | WEIGHT: 293 LBS | SYSTOLIC BLOOD PRESSURE: 164 MMHG

## 2017-02-16 DIAGNOSIS — N18.4 CKD (CHRONIC KIDNEY DISEASE) STAGE 4, GFR 15-29 ML/MIN (HCC): Chronic | ICD-10-CM

## 2017-02-16 DIAGNOSIS — R60.0 BILATERAL EDEMA OF LOWER EXTREMITY: Primary | ICD-10-CM

## 2017-02-16 DIAGNOSIS — I50.32 CHRONIC DIASTOLIC HEART FAILURE (HCC): Chronic | ICD-10-CM

## 2017-02-16 DIAGNOSIS — I50.33 ACUTE ON CHRONIC DIASTOLIC CONGESTIVE HEART FAILURE (HCC): ICD-10-CM

## 2017-02-16 DIAGNOSIS — N18.3 TYPE 2 DIABETES MELLITUS WITH STAGE 3 CHRONIC KIDNEY DISEASE, UNSPECIFIED LONG TERM INSULIN USE STATUS: Primary | Chronic | ICD-10-CM

## 2017-02-16 DIAGNOSIS — E11.22 TYPE 2 DIABETES MELLITUS WITH STAGE 3 CHRONIC KIDNEY DISEASE, UNSPECIFIED LONG TERM INSULIN USE STATUS: Primary | Chronic | ICD-10-CM

## 2017-02-16 DIAGNOSIS — I82.441 ACUTE DEEP VEIN THROMBOSIS (DVT) OF TIBIAL VEIN OF RIGHT LOWER EXTREMITY (HCC): ICD-10-CM

## 2017-02-16 PROBLEM — I50.9 CHF EXACERBATION (HCC): Status: ACTIVE | Noted: 2017-02-16

## 2017-02-16 LAB
ALBUMIN SERPL BCP-MCNC: 2.9 G/DL (ref 3.5–5)
ALBUMIN/GLOB SERPL: 0.9 {RATIO} (ref 1.1–2.2)
ALP SERPL-CCNC: 62 U/L (ref 45–117)
ALT SERPL-CCNC: 23 U/L (ref 12–78)
ANION GAP BLD CALC-SCNC: 11 MMOL/L (ref 5–15)
AST SERPL W P-5'-P-CCNC: 8 U/L (ref 15–37)
ATRIAL RATE: 84 BPM
BASOPHILS # BLD AUTO: 0 K/UL (ref 0–0.1)
BASOPHILS # BLD: 0 % (ref 0–1)
BILIRUB SERPL-MCNC: 0.4 MG/DL (ref 0.2–1)
BNP SERPL-MCNC: 378 PG/ML (ref 0–125)
BUN SERPL-MCNC: 53 MG/DL (ref 6–20)
BUN/CREAT SERPL: 17 (ref 12–20)
CALCIUM SERPL-MCNC: 8.4 MG/DL (ref 8.5–10.1)
CALCULATED P AXIS, ECG09: 55 DEGREES
CALCULATED R AXIS, ECG10: 20 DEGREES
CALCULATED T AXIS, ECG11: 57 DEGREES
CHLORIDE SERPL-SCNC: 106 MMOL/L (ref 97–108)
CO2 SERPL-SCNC: 23 MMOL/L (ref 21–32)
CREAT SERPL-MCNC: 3.15 MG/DL (ref 0.55–1.02)
DIAGNOSIS, 93000: NORMAL
EOSINOPHIL # BLD: 0.1 K/UL (ref 0–0.4)
EOSINOPHIL NFR BLD: 1 % (ref 0–7)
ERYTHROCYTE [DISTWIDTH] IN BLOOD BY AUTOMATED COUNT: 16 % (ref 11.5–14.5)
GLOBULIN SER CALC-MCNC: 3.4 G/DL (ref 2–4)
GLUCOSE BLD STRIP.AUTO-MCNC: 247 MG/DL (ref 65–100)
GLUCOSE SERPL-MCNC: 292 MG/DL (ref 65–100)
HCT VFR BLD AUTO: 26.3 % (ref 35–47)
HGB BLD-MCNC: 8.5 G/DL (ref 11.5–16)
INR BLD: 5.4
INR PPP: 6.1 (ref 0.9–1.1)
LYMPHOCYTES # BLD AUTO: 7 % (ref 12–49)
LYMPHOCYTES # BLD: 1 K/UL (ref 0.8–3.5)
MCH RBC QN AUTO: 30.5 PG (ref 26–34)
MCHC RBC AUTO-ENTMCNC: 32.3 G/DL (ref 30–36.5)
MCV RBC AUTO: 94.3 FL (ref 80–99)
MONOCYTES # BLD: 0.6 K/UL (ref 0–1)
MONOCYTES NFR BLD AUTO: 5 % (ref 5–13)
NEUTS SEG # BLD: 12.2 K/UL (ref 1.8–8)
NEUTS SEG NFR BLD AUTO: 87 % (ref 32–75)
P-R INTERVAL, ECG05: 166 MS
PLATELET # BLD AUTO: 211 K/UL (ref 150–400)
POTASSIUM SERPL-SCNC: 4.1 MMOL/L (ref 3.5–5.1)
PROT SERPL-MCNC: 6.3 G/DL (ref 6.4–8.2)
PROTHROMBIN TIME: 64.7 SEC (ref 9–11.1)
PT POC: 64.8 SEC
Q-T INTERVAL, ECG07: 360 MS
QRS DURATION, ECG06: 84 MS
QTC CALCULATION (BEZET), ECG08: 425 MS
RBC # BLD AUTO: 2.79 M/UL (ref 3.8–5.2)
SERVICE CMNT-IMP: ABNORMAL
SODIUM SERPL-SCNC: 140 MMOL/L (ref 136–145)
TROPONIN I SERPL-MCNC: 0.04 NG/ML
VALID INTERNAL CONTROL?: YES
VENTRICULAR RATE, ECG03: 84 BPM
WBC # BLD AUTO: 14 K/UL (ref 3.6–11)

## 2017-02-16 PROCEDURE — 74011250637 HC RX REV CODE- 250/637: Performed by: FAMILY MEDICINE

## 2017-02-16 PROCEDURE — 94640 AIRWAY INHALATION TREATMENT: CPT

## 2017-02-16 PROCEDURE — 71020 XR CHEST PA LAT: CPT

## 2017-02-16 PROCEDURE — 85610 PROTHROMBIN TIME: CPT | Performed by: EMERGENCY MEDICINE

## 2017-02-16 PROCEDURE — 85025 COMPLETE CBC W/AUTO DIFF WBC: CPT | Performed by: EMERGENCY MEDICINE

## 2017-02-16 PROCEDURE — 65660000000 HC RM CCU STEPDOWN

## 2017-02-16 PROCEDURE — 36415 COLL VENOUS BLD VENIPUNCTURE: CPT | Performed by: EMERGENCY MEDICINE

## 2017-02-16 PROCEDURE — 74011000250 HC RX REV CODE- 250: Performed by: FAMILY MEDICINE

## 2017-02-16 PROCEDURE — 74011636637 HC RX REV CODE- 636/637: Performed by: FAMILY MEDICINE

## 2017-02-16 PROCEDURE — 83880 ASSAY OF NATRIURETIC PEPTIDE: CPT | Performed by: EMERGENCY MEDICINE

## 2017-02-16 PROCEDURE — 82962 GLUCOSE BLOOD TEST: CPT

## 2017-02-16 PROCEDURE — 99285 EMERGENCY DEPT VISIT HI MDM: CPT

## 2017-02-16 PROCEDURE — 93005 ELECTROCARDIOGRAM TRACING: CPT

## 2017-02-16 PROCEDURE — 84484 ASSAY OF TROPONIN QUANT: CPT | Performed by: EMERGENCY MEDICINE

## 2017-02-16 PROCEDURE — 74011250636 HC RX REV CODE- 250/636: Performed by: FAMILY MEDICINE

## 2017-02-16 PROCEDURE — 74011000250 HC RX REV CODE- 250: Performed by: INTERNAL MEDICINE

## 2017-02-16 PROCEDURE — 80053 COMPREHEN METABOLIC PANEL: CPT | Performed by: EMERGENCY MEDICINE

## 2017-02-16 RX ORDER — GUAIFENESIN 100 MG/5ML
81 LIQUID (ML) ORAL DAILY
Status: DISCONTINUED | OUTPATIENT
Start: 2017-02-17 | End: 2017-02-20 | Stop reason: HOSPADM

## 2017-02-16 RX ORDER — BUMETANIDE 1 MG/1
1 TABLET ORAL ONCE
Status: DISCONTINUED | OUTPATIENT
Start: 2017-02-16 | End: 2017-02-16

## 2017-02-16 RX ORDER — NITROGLYCERIN 0.4 MG/1
0.4 TABLET SUBLINGUAL AS NEEDED
Status: DISCONTINUED | OUTPATIENT
Start: 2017-02-16 | End: 2017-02-20 | Stop reason: HOSPADM

## 2017-02-16 RX ORDER — BISACODYL 5 MG
5 TABLET, DELAYED RELEASE (ENTERIC COATED) ORAL DAILY PRN
Status: DISCONTINUED | OUTPATIENT
Start: 2017-02-16 | End: 2017-02-20 | Stop reason: HOSPADM

## 2017-02-16 RX ORDER — AMLODIPINE BESYLATE 5 MG/1
10 TABLET ORAL DAILY
Status: DISCONTINUED | OUTPATIENT
Start: 2017-02-17 | End: 2017-02-20

## 2017-02-16 RX ORDER — INSULIN LISPRO 100 [IU]/ML
INJECTION, SOLUTION INTRAVENOUS; SUBCUTANEOUS
Status: DISCONTINUED | OUTPATIENT
Start: 2017-02-16 | End: 2017-02-20 | Stop reason: HOSPADM

## 2017-02-16 RX ORDER — OXYCODONE AND ACETAMINOPHEN 5; 325 MG/1; MG/1
1 TABLET ORAL
Status: DISCONTINUED | OUTPATIENT
Start: 2017-02-16 | End: 2017-02-20 | Stop reason: HOSPADM

## 2017-02-16 RX ORDER — AMOXICILLIN 250 MG
2 CAPSULE ORAL DAILY
Status: DISCONTINUED | OUTPATIENT
Start: 2017-02-17 | End: 2017-02-20 | Stop reason: HOSPADM

## 2017-02-16 RX ORDER — ACETAMINOPHEN 325 MG/1
650 TABLET ORAL
Status: DISCONTINUED | OUTPATIENT
Start: 2017-02-16 | End: 2017-02-16

## 2017-02-16 RX ORDER — ONDANSETRON 4 MG/1
4 TABLET, ORALLY DISINTEGRATING ORAL
Status: DISCONTINUED | OUTPATIENT
Start: 2017-02-16 | End: 2017-02-16

## 2017-02-16 RX ORDER — IPRATROPIUM BROMIDE AND ALBUTEROL SULFATE 2.5; .5 MG/3ML; MG/3ML
3 SOLUTION RESPIRATORY (INHALATION)
Status: ACTIVE | OUTPATIENT
Start: 2017-02-16 | End: 2017-02-17

## 2017-02-16 RX ORDER — ISOSORBIDE MONONITRATE 60 MG/1
120 TABLET, EXTENDED RELEASE ORAL DAILY
Status: DISCONTINUED | OUTPATIENT
Start: 2017-02-17 | End: 2017-02-20 | Stop reason: HOSPADM

## 2017-02-16 RX ORDER — INSULIN LISPRO 100 [IU]/ML
INJECTION, SOLUTION INTRAVENOUS; SUBCUTANEOUS
Status: DISCONTINUED | OUTPATIENT
Start: 2017-02-16 | End: 2017-02-16

## 2017-02-16 RX ORDER — IPRATROPIUM BROMIDE AND ALBUTEROL SULFATE 2.5; .5 MG/3ML; MG/3ML
3 SOLUTION RESPIRATORY (INHALATION)
Status: DISCONTINUED | OUTPATIENT
Start: 2017-02-16 | End: 2017-02-18

## 2017-02-16 RX ORDER — SODIUM CHLORIDE 0.9 % (FLUSH) 0.9 %
5-10 SYRINGE (ML) INJECTION AS NEEDED
Status: DISCONTINUED | OUTPATIENT
Start: 2017-02-16 | End: 2017-02-20 | Stop reason: HOSPADM

## 2017-02-16 RX ORDER — SODIUM CHLORIDE 0.9 % (FLUSH) 0.9 %
5-10 SYRINGE (ML) INJECTION EVERY 8 HOURS
Status: DISCONTINUED | OUTPATIENT
Start: 2017-02-16 | End: 2017-02-20 | Stop reason: HOSPADM

## 2017-02-16 RX ORDER — MAGNESIUM SULFATE 100 %
4 CRYSTALS MISCELLANEOUS AS NEEDED
Status: DISCONTINUED | OUTPATIENT
Start: 2017-02-16 | End: 2017-02-20 | Stop reason: HOSPADM

## 2017-02-16 RX ORDER — DEXTROSE 50 % IN WATER (D50W) INTRAVENOUS SYRINGE
12.5-25 AS NEEDED
Status: DISCONTINUED | OUTPATIENT
Start: 2017-02-16 | End: 2017-02-20 | Stop reason: HOSPADM

## 2017-02-16 RX ORDER — ONDANSETRON 2 MG/ML
4 INJECTION INTRAMUSCULAR; INTRAVENOUS
Status: DISCONTINUED | OUTPATIENT
Start: 2017-02-16 | End: 2017-02-20 | Stop reason: HOSPADM

## 2017-02-16 RX ORDER — ATORVASTATIN CALCIUM 20 MG/1
80 TABLET, FILM COATED ORAL EVERY EVENING
Status: DISCONTINUED | OUTPATIENT
Start: 2017-02-16 | End: 2017-02-20 | Stop reason: HOSPADM

## 2017-02-16 RX ORDER — BUMETANIDE 0.25 MG/ML
2 INJECTION INTRAMUSCULAR; INTRAVENOUS EVERY 12 HOURS
Status: DISCONTINUED | OUTPATIENT
Start: 2017-02-16 | End: 2017-02-20

## 2017-02-16 RX ORDER — HYDRALAZINE HYDROCHLORIDE 25 MG/1
25 TABLET, FILM COATED ORAL
Status: DISCONTINUED | OUTPATIENT
Start: 2017-02-16 | End: 2017-02-20 | Stop reason: HOSPADM

## 2017-02-16 RX ORDER — PANTOPRAZOLE SODIUM 40 MG/1
40 TABLET, DELAYED RELEASE ORAL
Status: DISCONTINUED | OUTPATIENT
Start: 2017-02-17 | End: 2017-02-17

## 2017-02-16 RX ORDER — SUCRALFATE 1 G/1
1 TABLET ORAL
Status: DISCONTINUED | OUTPATIENT
Start: 2017-02-16 | End: 2017-02-20 | Stop reason: HOSPADM

## 2017-02-16 RX ORDER — ACETAMINOPHEN 500 MG
500 TABLET ORAL
Status: DISCONTINUED | OUTPATIENT
Start: 2017-02-16 | End: 2017-02-20 | Stop reason: HOSPADM

## 2017-02-16 RX ORDER — BUMETANIDE 1 MG/1
1 TABLET ORAL 2 TIMES DAILY
Status: DISCONTINUED | OUTPATIENT
Start: 2017-02-16 | End: 2017-02-16

## 2017-02-16 RX ORDER — BUMETANIDE 0.25 MG/ML
1 INJECTION INTRAMUSCULAR; INTRAVENOUS
Status: DISCONTINUED | OUTPATIENT
Start: 2017-02-16 | End: 2017-02-16

## 2017-02-16 RX ADMIN — INSULIN LISPRO 2 UNITS: 100 INJECTION, SOLUTION INTRAVENOUS; SUBCUTANEOUS at 21:54

## 2017-02-16 RX ADMIN — BUMETANIDE 2 MG: 0.25 INJECTION, SOLUTION INTRAMUSCULAR; INTRAVENOUS at 19:00

## 2017-02-16 RX ADMIN — ATORVASTATIN CALCIUM 80 MG: 20 TABLET, FILM COATED ORAL at 19:00

## 2017-02-16 RX ADMIN — IPRATROPIUM BROMIDE AND ALBUTEROL SULFATE 3 ML: .5; 2.5 SOLUTION RESPIRATORY (INHALATION) at 20:24

## 2017-02-16 RX ADMIN — SUCRALFATE 1 G: 1 TABLET ORAL at 21:54

## 2017-02-16 RX ADMIN — Medication 10 ML: at 21:00

## 2017-02-16 RX ADMIN — Medication 10 ML: at 19:00

## 2017-02-16 RX ADMIN — ONDANSETRON 4 MG: 2 INJECTION INTRAMUSCULAR; INTRAVENOUS at 20:58

## 2017-02-16 RX ADMIN — OXYCODONE HYDROCHLORIDE AND ACETAMINOPHEN 1 TABLET: 5; 325 TABLET ORAL at 20:59

## 2017-02-16 NOTE — ED NOTES
Pt states In the past Bumex and other diaretics have caused her to go into kidney failure. Expressed concern to [address]  [facility] [address] residents and they stated to hold med at this time.

## 2017-02-16 NOTE — PATIENT INSTRUCTIONS
Skip next 2 days then restart at 2mg(1/2 pill) once a day, f/u 1 week.  ---->Pt sent to ED         Your Hemodialysis Access: Care Instructions  Your Care Instructions  Hemodialysis, or dialysis, is the use of a machine to remove wastes from your blood. You need it if your kidneys are not able to remove wastes on their own. A dialysis access is the place in your arm, or sometimes in your leg, where a doctor creates a blood vessel that carries a large flow of blood. When you have dialysis, two needles are placed in this blood vessel and are connected to the dialysis machine. Your blood flows out of one needle and into the machine to be cleaned. Then your cleaned blood flows back into your body through the other needle. Sometimes, a doctor makes a short-term access through a tube, called a catheter, placed in your neck, upper chest, or groin. Your doctor creates an access during a minor surgery. You need to take care of your access to keep it working and to prevent infection. Follow-up care is a key part of your treatment and safety. Be sure to make and go to all appointments, and call your doctor if you are having problems. Its also a good idea to know your test results and keep a list of the medicines you take. How can you care for yourself at home? · After your doctor creates an access, keep it dry for at least 2 days. · Squeeze a soft ball or other object as instructed after the access is placed. This will help blood flow through the access and help prevent blood clots. · After you have dialysis, check to see whether the access bleeds or swells. Let your doctor know if your arm bleeds or swells. · Do not lift anything heavy with the arm that has the access. · Do not bump your arm. · Do not wear tight clothing or jewelry over the access. · Do not sleep with your access arm under your body. · Have blood drawn or blood pressure taken from your other arm. · Keep the access clean and dry.   · Do not put cream or lotion on or near the access. When should you call for help? Call your doctor now or seek immediate medical care if:  · You have signs of infection, such as:  ¨ Increased pain, swelling, warmth, or redness around the access. ¨ Red streaks leading from the access. ¨ Pus draining from the access. ¨ Swollen lymph nodes in your neck, armpits, or groin. ¨ A fever. · You do not feel a pulse in your access. Watch closely for changes in your health, and be sure to contact your doctor if:  · You have pain, swelling, or bleeding. Some pain or swelling is normal after surgery to create the access. But pain and swelling should get better over time. Where can you learn more? Go to http://lindsay-kai.info/. Enter L169 in the search box to learn more about \"Your Hemodialysis Access: Care Instructions. \"  Current as of: November 20, 2015  Content Version: 11.1  © 1744-6795 [employer]. Care instructions adapted under license by "[employer] (which disclaims liability or warranty for this information). If you have questions about a medical condition or this instruction, always ask your healthcare professional. [address] any warranty or liability for your use of this information.

## 2017-02-16 NOTE — ED PROVIDER NOTES
HPI Comments: 63 y.o. female with extensive past medical history, please see list, significant for obesity, DVT, ILD, aortic aneurysm, diastolic heart failure, DM, CAD, CKD, and stent placement who presents from PCP office for evaluation of unexpected weight gain. Pt states that over the course of the past week she has gained approximately 28 lbs with associated increased SOB and chest tightness. Pt was at her PCP's office today who sent the pt to the ED for further evaluation. Pt claims that she normally wears 2 L of O2 at home. Pt was just recently admitted to the hospital for a DVT of the right lower extremity. Pt says that her treatment at the time was complicated since she is allergic to CAT scan dye and is in kidney failure. Pt states that her Bumex was stopped and the restarted during her most recent admission and is currently on 1 mg of Bumex twice a day. Pt notes that her cardiologist, Dr. Maria C Jenkins has already been informed that she was coming to the ED today. Pt says that she last had a stress test in October of 2016. Pt denies fever or productive cough. There are no other acute medical concerns at this time. PCP: [address] [place] [address] Ne, DO   Nephrology: James Green MD   Cardiology: Walter Horton MD  Pulmonary: [facility] (recently switched to new physician)    Old Chart Review:  Pt was admitted from 2/2/17 - 2/10/17 for DVT and hypoxia. Note written by Tigist Vance. Yanely Pillai, as dictated by Ray Balderas MD 1:15 PM      The history is provided by the patient. No  was used.         Past Medical History:   Diagnosis Date     Sleep Apnea 2/16/2011    Angina, class III ([address] 8/9/2013    Aortic aneurysm ([address]     CAD (coronary Artery Disease) Native Artery 2/16/2011    CKD (chronic kidney disease) stage 4, GFR 15-29 ml/min ([address] 2/10/2017    Diabetic gastroparesis (HCC)     Diastolic heart failure ([address] 10/5/2012    Esophageal stricture 2012 dialted Dr. Earnestine Gusman [id number] deficiency [facility])      trait    GERD (gastroesophageal reflux disease)     Hypertensive Cardiovasc Dis Benign, No CHF 2/16/2011    ILD (interstitial lung disease) [facility]      open lung bx CJW 2010    OA (osteoarthritis)     Obesity 2/16/2011    Ovarian cancer ([facility]      cervical and uterine    Rheumatoid arteritis ([facility]     T2DM (type 2 diabetes mellitus) ([facility] 8/9/2013    Tobacco use disorder 3/21/2012    Uterine cervix cancer ([facility]     Vitamin D deficiency 8/9/2013       Past Surgical History:   Procedure Laterality Date    Hx orthopaedic  11/12/12     right knee replacement    Hx hysterectomy      Hx cholecystectomy      Hx appendectomy      Hx hernia repair      Hx carpal tunnel release       bilateral    Hx tonsil and adenoidectomy      Pr cardiac surg procedure unlist       stents    Upper gi endoscopy,dilatn w guide  6/24/2016          Colonoscopy N/A 6/24/2016     COLONOSCOPY performed by Mindi Gant MD at [facility]         Family History:   Problem Relation Age of Onset    Heart Disease Mother     Heart Disease Brother        Social History     Social History    Marital status:      Spouse name: N/A    Number of children: N/A    Years of education: N/A     Occupational History    Not on file. Social History Main Topics    Smoking status: Former Smoker     Packs/day: 0.50     Years: 41.00     Types: Cigarettes     Quit date: 11/9/2014    Smokeless tobacco: Never Used    Alcohol use No    Drug use: No    Sexual activity: Not on file     Other Topics Concern    Not on file     Social History Narrative         ALLERGIES: Contrast dye [iodine]; Levaquin [levofloxacin]; and Morphine    Review of Systems   Constitutional: Positive for unexpected weight change. Negative for fever. Respiratory: Positive for chest tightness and shortness of breath. Negative for cough. Cardiovascular: Positive for chest pain.    All other systems reviewed and are negative. There were no vitals filed for this visit. Physical Exam   Constitutional: She is oriented to person, place, and time. She appears well-developed and well-nourished. No distress. obese   HENT:   Head: Normocephalic and atraumatic. Eyes: Conjunctivae are normal.   Neck: Normal range of motion. Cardiovascular: Normal rate, regular rhythm, normal heart sounds and intact distal pulses. Exam reveals no friction rub. No murmur heard. Pulmonary/Chest: Effort normal and breath sounds normal. No respiratory distress. She has no wheezes. She has no rales. Abdominal: Soft. Bowel sounds are normal. She exhibits no distension. There is no tenderness. There is no rebound and no guarding. Musculoskeletal: Normal range of motion. She exhibits edema. She exhibits no tenderness. 3+ pitting edema b/l   Neurological: She is alert and oriented to person, place, and time. She exhibits normal muscle tone. Coordination normal.   Skin: Skin is warm and dry. She is not diaphoretic. No pallor. Psychiatric: She has a normal mood and affect. Her behavior is normal.   Nursing note and vitals reviewed. MDM  Number of Diagnoses or Management Options  Acute on chronic diastolic congestive heart failure [facility]):   Bilateral edema of lower extremity:   Diagnosis management comments: chf causing weight gain vs worsening renal failure. Check labs. coulf be PE though pt on coumadin will check level first and PE would not be causing 30 lb weight gain.  Pt sent by pcp for admit       Amount and/or Complexity of Data Reviewed  Clinical lab tests: ordered and reviewed  Tests in the radiology section of CPT®: ordered and reviewed  Review and summarize past medical records: yes  Discuss the patient with other providers: yes (Family practice)  Independent visualization of images, tracings, or specimens: yes (ekg)    Patient Progress  Patient progress: stable    ED Course       Procedures  EKG interpretation: (Preliminary)  Rhythm: normal sinus rhythm; and regular . Rate (approx.): 85; Axis: normal; P wave: normal; QRS interval: normal ; ST/T wave: non-specific changes;     CONSULT NOTE:  2:56 PM Kamala Roy MD spoke with Dr. Julia Arredondo, Consult for family practice resident. Discussed available diagnostic tests and clinical findings. She is in agreement with care plans as outlined. Dr. Julia Arredondo will come see the pt.    3:28 PM  Patient is being admitted to the hospital.  The results of their tests and reasons for their admission have been discussed with them and/or available family. They convey agreement and understanding for the need to be admitted and for their admission diagnosis. Consultation will be made now with the inpatient physician for hospitalization.

## 2017-02-16 NOTE — PROGRESS NOTES
Pt here to [facility] care  States she has gained 30+ lbs since being dc'd last week for pneumonia and DVT in right leg  Would like rx's not currently taking removed from med list   Needs INR  Checked

## 2017-02-16 NOTE — PROGRESS NOTES
Riley Cornell is a 63 y.o. female   Chief Complaint   Patient presents with   [facility] Establish Care    pt with recent discharge from [facility] for DVT likely PE but allergic to dye. Pt current GFR 14, seeing Dr Esteban Siegel and is going to need to have an AV fistula placed. Pt currently on coumadin with an INR of 5.4. Pt currently taking 4mg coumadin daily. Has been on this dose for a week. Pt states that she has had a 35 lb weight gain in the past week. Pt was 327 on 2/11/17 and today is 355. Pt is also currently on a steroid, prednisone on last 2 days of this. Pt also with Hx of 1 MI, has 3 stents and states she has 100% blockage in 1 artery. From record review RCA with collaterals. Pt states she has an extensive fam Hx of renal failure. Mother and 2 brothers. On 2/2 pt had an echo showing grade 1 diastolic dysfunction, EF 49%. Chief Complaint   Patient presents with   [employer]     she is a 63y.o. year old female who presents for evalution. Reviewed PmHx, RxHx, FmHx, SocHx, AllgHx and updated and dated in the chart. Review of Systems - negative except as listed above in the HPI    Objective:     Vitals:    02/16/17 1058   BP: 164/82   Resp: 20   Temp: 98.2 °F (36.8 °C)   TempSrc: Oral   Weight: (!) 355 lb (161 kg)   Height: 5' 10\" (1.778 m)       Current Outpatient Prescriptions   Medication Sig    bumetanide (BUMEX) 1 mg tablet Take 1 Tab by mouth two (2) times a day.  linaclotide (LINZESS) 290 mcg cap capsule Take 1 Cap by mouth Daily (before breakfast) for 10 days.  predniSONE (DELTASONE) 10 mg tablet Take 30mg for three days then 20mg for three days then 10mg for three days    warfarin (COUMADIN) 4 mg tablet Take 1 Tab by mouth daily.  sucralfate (CARAFATE) 1 gram tablet Take 1 g by mouth Before breakfast, lunch, dinner and at bedtime.  fluticasone (FLONASE) 50 mcg/actuation nasal spray 2 Sprays by Both Nostrils route nightly.     albuterol (PROVENTIL HFA, VENTOLIN HFA, PROAIR HFA) 90 mcg/actuation inhaler Take 2 Puffs by inhalation every four (4) hours as needed for Wheezing.  calcitRIOL (ROCALTROL) 0.25 mcg capsule Take 0.25 mcg by mouth two (2) days a week. Patient takes on Monday and Thursday    isosorbide mononitrate ER (IMDUR) 120 mg CR tablet TAKE 1 TABLET BY MOUTH EVERY DAY    carvedilol (COREG) 25 mg tablet TAKE 1 TABLET BY MOUTH TWICE A DAY    pantoprazole (PROTONIX) 40 mg tablet Take 1 Tab by mouth daily. Indications: GASTROESOPHAGEAL REFLUX    INSULIN LISPRO (HUMALOG SC) by SubCUTAneous route Before breakfast, lunch, and dinner. Sliding scale.  amLODIPine (NORVASC) 10 mg tablet Take 10 mg by mouth daily.  ergocalciferol (VITAMIN D2) 50,000 unit capsule Take 50,000 Units by mouth every Tuesday and Thursday.  aspirin 81 mg tablet Take 81 mg by mouth daily.  atorvastatin (LIPITOR) 80 mg tablet Take 80 mg by mouth every evening.  ondansetron (ZOFRAN ODT) 4 mg disintegrating tablet Take 1 Tab by mouth every six (6) hours as needed for Nausea.  chlorpheniramine-HYDROcodone (TUSSIONEX) 10-8 mg/5 mL suspension Take 5 mL by mouth every twelve (12) hours. Max Daily Amount: 10 mL.  polyethylene glycol (MIRALAX) 17 gram packet Take 1 Packet by mouth daily.  senna-docusate (PERICOLACE) 8.6-50 mg per tablet Take 2 Tabs by mouth daily.  albuterol-ipratropium (DUONEB) 2.5 mg-0.5 mg/3 ml nebu 3 mL by Nebulization route every four (4) hours.  mupirocin (BACTROBAN) 2 % ointment Apply  to affected area two (2) times daily as needed (lesions on feet when needed). Ointment external two times daily to lesions on feet until healed - now using as needed    temazepam (RESTORIL) 30 mg capsule Take 30 mg by mouth nightly as needed for Sleep.  benzonatate (TESSALON PERLES) 100 mg capsule Take 100 mg by mouth three (3) times daily as needed for Cough.     nitroglycerin (NITROSTAT) 0.3 mg SL tablet 1 Tab by SubLINGual route every five (5) minutes as needed for Chest Pain.  oxyCODONE-acetaminophen (PERCOCET 7.5) 7.5-325 mg per tablet Take 1 Tab by mouth every four (4) hours as needed. No current facility-administered medications for this visit. Physical Examination: General appearance - alert, well appearing, and in no distress  Mental status - alert, oriented to person, place, and time  Eyes - pupils equal and reactive, extraocular eye movements intact  Chest - rhonchi upper lobes b/l and mild rales lower lobes  Heart - normal rate, regular rhythm, normal S1, S2, no murmurs, rubs, clicks or gallops  Extremities - pedal edema 2 + b/l  Skin - normal coloration and turgor, no rashes, no suspicious skin lesions noted      Assessment/ Plan:   Monika Espino was seen today for establish care. Diagnoses and all orders for this visit:    Type 2 diabetes mellitus with stage 3 chronic kidney disease, unspecified long term insulin use status ([facility]  -     Cancel: METABOLIC PANEL, BASIC  -     REFERRAL TO EMERGENCY DEPARTMENT    Acute deep vein thrombosis (DVT) of tibial vein of right lower extremity (HCC)  -     REFERRAL TO EMERGENCY DEPARTMENT    Chronic diastolic heart failure ([facility])  -     Cancel: METABOLIC PANEL, BASIC  -     Cancel: PRO-BNP  -     REFERRAL TO EMERGENCY DEPARTMENT    CKD (chronic kidney disease) stage 4, GFR 15-29 ml/min ([facility])  -     Cancel: URINALYSIS W/ RFLX MICROSCOPIC  -     REFERRAL TO EMERGENCY DEPARTMENT    pt sent to ED with  given increased edema including hands and arms along with a 30lb weight gain over past 5 days, increasing SOB with minimal activity. ED triage notified. Follow-up Disposition:  Return in about 1 week (around 2/23/2017), or if symptoms worsen or fail to improve. I have discussed the diagnosis with the patient and the intended plan as seen in the above orders. The patient has received an after-visit summary and questions were answered concerning future plans.  Pt conveyed understanding of plan.    Medication Side Effects and Warnings were discussed with patient      Philippe Brown DO

## 2017-02-16 NOTE — ED TRIAGE NOTES
Pt has had 28lb weight gain since Friday when she was discharged from hospital. Kareem Parra to see [facility] and sent to ED. Pt had clot in leg and possibly in lungs. Pt having increased SOB, chest tightness.

## 2017-02-16 NOTE — PROGRESS NOTES
TRANSFER - IN REPORT:    Verbal report received from Berny Sherman RN(name) on Romain Joseph  being received from ER(unit) for routine progression of care      Report consisted of patients Situation, Background, Assessment and   Recommendations(SBAR). Information from the following report(s) SBAR, Kardex, [facility] and Recent Results was reviewed with the receiving nurse. Opportunity for questions and clarification was provided. Assessment completed upon patients arrival to unit and care assumed.

## 2017-02-16 NOTE — H&P
[facility]   Admission H&P    Date of admission: 2/16/2017    Patient name: Radha Hager  MRN: [id number]  YOB: 1955  Age: 63 y.o. Primary care provider:  None     Source of Information: patient, medical records    Chief complaint:  Weight gain, sob, chest tightness     History of Present Illness  Radha Hager is a 63 y.o. female with a PMH of HFpEF, HTN, CAD s/p stent, DVT, CKD stage 4, morbid obesity, ILD, GERD, IDDM who presents to the ER complaining of SOB, weight gain, and chest tightness. She states that she has felt SOB since discharge on 2/10, but that it has been getting progressively worse. She is on 2L NC baseline at home. Since discharge she has been tapering her prednisone (today is her last day at 10mg) and using her albuterol nebs 1xday. She denies any fevers, although she has had chills and a productive cough. She notes that she has gained 28lbs since being discharged despite taking her Bumex as prescribed. She has baseline orthopnea requiring 4 pillows to sleep, which is unchanged. She notes that she has substernal chest pressure that is aggravated with exertion and relieved with rest, and resolved after taking nitroglycerin twice in the past week. She states that she currently has the same pressure, and that it does not radiate. In the ER, vital signs were remarkable for BP of 182/80, RR 24 on 2L NC. Labs were remarkable for WBC of 14.0, Hg 8.5 (baseline ~9), Cr of 3.15 (baseline ~3.5), INR 6.1. EKG NSR. CXR showed stable opacities in bilateral LL and RUL. No treatment given in ER. Home Medications   Prior to Admission medications    Medication Sig Start Date End Date Taking? Authorizing Provider   ondansetron (ZOFRAN ODT) 4 mg disintegrating tablet Take 1 Tab by mouth every six (6) hours as needed for Nausea.  2/11/17   Juarez Orozco MD   bumetanide (BUMEX) 1 mg tablet Take 1 Tab by mouth two (2) times a day. 2/9/17   Kurt Duran MD   chlorpheniramine-HYDROcodone (TUSSIONEX) 10-8 mg/5 mL suspension Take 5 mL by mouth every twelve (12) hours. Max Daily Amount: 10 mL. 2/9/17   Kurt Duran MD   linaclotide [facility]) 290 mcg cap capsule Take 1 Cap by mouth Daily (before breakfast) for 10 days. 2/9/17 2/19/17  Kurt Duran MD   polyethylene glycol (MIRALAX) 17 gram packet Take 1 Packet by mouth daily. 2/9/17   Kurt Duran MD   senna-docusate (PERICOLACE) 8.6-50 mg per tablet Take 2 Tabs by mouth daily. 2/9/17   Kurt Duran MD   predniSONE (DELTASONE) 10 mg tablet Take 30mg for three days then 20mg for three days then 10mg for three days 2/9/17   Kurt Duran MD   warfarin (COUMADIN) 4 mg tablet Take 1 Tab by mouth daily. 2/9/17   Kurt Duran MD   albuterol-ipratropium (DUONEB) 2.5 mg-0.5 mg/3 ml nebu 3 mL by Nebulization route every four (4) hours. Historical Provider   sucralfate (CARAFATE) 1 gram tablet Take 1 g by mouth Before breakfast, lunch, dinner and at bedtime. Historical Provider   fluticasone (FLONASE) 50 mcg/actuation nasal spray 2 Sprays by Both Nostrils route nightly. Historical Provider   mupirocin (BACTROBAN) 2 % ointment Apply  to affected area two (2) times daily as needed (lesions on feet when needed). Ointment external two times daily to lesions on feet until healed - now using as needed    Historical Provider   temazepam (RESTORIL) 30 mg capsule Take 30 mg by mouth nightly as needed for Sleep. Historical Provider   albuterol (PROVENTIL HFA, VENTOLIN HFA, PROAIR HFA) 90 mcg/actuation inhaler Take 2 Puffs by inhalation every four (4) hours as needed for Wheezing. Historical Provider   calcitRIOL (ROCALTROL) 0.25 mcg capsule Take 0.25 mcg by mouth two (2) days a week. Patient takes on Monday and Thursday    Historical Provider   benzonatate (TESSALON PERLES) 100 mg capsule Take 100 mg by mouth three (3) times daily as needed for Cough.     Historical Provider   isosorbide mononitrate ER (IMDUR) 120 mg CR tablet TAKE 1 TABLET BY MOUTH EVERY DAY 11/21/16   Vazquez Ashley MD   carvedilol (COREG) 25 mg tablet TAKE 1 TABLET BY MOUTH TWICE A DAY 10/16/16   Vazquez Ashley MD   pantoprazole (PROTONIX) 40 mg tablet Take 1 Tab by mouth daily. Indications: GASTROESOPHAGEAL REFLUX 6/24/16   Tonja Dumont MD   nitroglycerin (NITROSTAT) 0.3 mg SL tablet 1 Tab by SubLINGual route every five (5) minutes as needed for Chest Pain. 6/24/16   Vazquez Ashley MD   INSULIN LISPRO (HUMALOG SC) by SubCUTAneous route Before breakfast, lunch, and dinner. Sliding scale. Abhijit Frey MD   oxyCODONE-acetaminophen (PERCOCET 7.5) 7.5-325 mg per tablet Take 1 Tab by mouth every four (4) hours as needed. 1/14/16   Historical Provider   amLODIPine (NORVASC) 10 mg tablet Take 10 mg by mouth daily. Historical Provider   ergocalciferol (VITAMIN D2) 50,000 unit capsule Take 50,000 Units by mouth every Tuesday and Thursday. Abhijit Frey MD   aspirin 81 mg tablet Take 81 mg by mouth daily. Abhijit Frey MD   atorvastatin (LIPITOR) 80 mg tablet Take 80 mg by mouth every evening.       Historical Provider     Allergies   Allergies   Allergen Reactions    Contrast Dye [Iodine] Anaphylaxis    Levaquin [Levofloxacin] Nausea and Vomiting    Morphine Hives and Itching       Past Medical History   Diagnosis Date     Sleep Apnea 2/16/2011    Angina, class III ([facility] [address] 8/9/2013    Aortic aneurysm ([facility] [address]     CAD (coronary Artery Disease) Native Artery 2/16/2011    CKD (chronic kidney disease) stage 4, GFR 15-29 ml/min ([facility] [address] 2/10/2017    Diabetic gastroparesis ([facility] [address]     Diastolic heart failure ([facility] [address] 10/5/2012    Esophageal stricture 2012     dialted Dr. Maricarmen Nguyen G6PD deficiency ([facility] [address]      trait    GERD (gastroesophageal reflux disease)     Hypertensive Cardiovasc Dis Benign, No CHF 2/16/2011    ILD (interstitial lung disease) ([facility] [address]      open lung bx CJW 2010    OA (osteoarthritis)  Obesity 2/16/2011    Ovarian cancer ([facility] [address]      cervical and uterine    Rheumatoid arteritis (HCC)     T2DM (type 2 diabetes mellitus) ([facility] [address] 8/9/2013    Tobacco use disorder 3/21/2012    Uterine cervix cancer ([facility] [address]     Vitamin D deficiency 8/9/2013       Past Surgical History   Procedure Laterality Date    Hx orthopaedic  11/12/12     right knee replacement    Hx hysterectomy      Hx cholecystectomy      Hx appendectomy      Hx hernia repair      Hx carpal tunnel release       bilateral    Hx tonsil and adenoidectomy      Pr cardiac surg procedure unlist       stents    Upper gi endoscopy,dilatn w guide  6/24/2016          Colonoscopy N/A 6/24/2016     COLONOSCOPY performed by Sam Petersen MD at [facility]       Family History   Problem Relation Age of Onset    Heart Disease Mother     Heart Disease Brother        Social History   Patient resides    Independently    X  With family care      Assisted living      SNF      Ambulates  X  Independently      With cane       Assisted walker           Alcohol history   X  None     Social     Chronic     Smoking history    None   X  Former smoker- 2 packs a day for 44 years and quit 3 years ago     Current smoker     History   Smoking Status    Former Smoker    Packs/day: 0.50    Years: 41.00    Types: Cigarettes    Quit date: 11/9/2014   Smokeless Tobacco    Never Used           Drug history  X  None     Former drug user     Current drug user       Code status    Full code   X  DNR/DNI     Partial    Code status discussed with the patient/caregivers.     Review of Systems  Constitutional: negative for fever, +weight gain/chills/nausea, no vomiting   Eyes: +blurry vision  Respiratory: + SOB, + dry cough   Cardiovascular: + substernal chest tightness, +orthopnea, +SOB   Gastrointestinal: + abdominal pain, + constipation  Genitourinary: no dysuria  Neurological: no headaches/dizziness    Physical Exam  Visit Vitals    /80    Pulse 80  Temp 98 °F (36.7 °C)    Resp 22    Ht 5' 10\" (1.778 m)    Wt (!) 354 lb 15.1 oz (161 kg)    SpO2 98%    BMI 50.93 kg/m2        General: No acute distress. Alert. Cooperative. Seems a bit anxious. On 2L NC   Head: Normocephalic. Atraumatic. Eyes:  Conjunctiva pink. Sclera white. Neck: Supple. Normal ROM. No stiffness. Respiratory: Good air movement, on 2lNC, + wheezing bilaterally, some fine crackles in the base of RL. Cardiovascular: RRR. Normal S1,S2. No m/r/g. No JVD, no carotid bruit. Pulses 2+ throughout. GI: + bowel sounds. Minimally tender in the LUQ. No rebound tenderness or guarding. Nondistended. Obese   Extremities: 3+ pitting edema bilaterally, + LE tenderness, no cords. Neuro: CN II-XII grossly intact. Skin: Clear. No rashes. No ulcers. Laboratory Data  Recent Results (from the past 24 hour(s))   EKG, 12 LEAD, INITIAL    Collection Time: 02/16/17  1:18 PM   Result Value Ref Range    Ventricular Rate 84 BPM    Atrial Rate 84 BPM    P-R Interval 166 ms    QRS Duration 84 ms    Q-T Interval 360 ms    QTC Calculation (Bezet) 425 ms    Calculated P Axis 55 degrees    Calculated R Axis 20 degrees    Calculated T Axis 57 degrees    Diagnosis       Normal sinus rhythm  Normal ECG  When compared with ECG of 05-FEB-2017 23:46,  No significant change was found  Confirmed by Christianne Reza MD, GIANNA ([id number]) on 2/16/2017 3:13:54 PM     CBC WITH AUTOMATED DIFF    Collection Time: 02/16/17  1:42 PM   Result Value Ref Range    WBC 14.0 (H) 3.6 - 11.0 K/uL    RBC 2.79 (L) 3.80 - 5.20 M/uL    HGB 8.5 (L) 11.5 - 16.0 g/dL    HCT 26.3 (L) 35.0 - 47.0 %    MCV 94.3 80.0 - 99.0 FL    MCH 30.5 26.0 - 34.0 PG    MCHC 32.3 30.0 - 36.5 g/dL    RDW 16.0 (H) 11.5 - 14.5 %    PLATELET 980 882 - 700 K/uL    NEUTROPHILS 87 (H) 32 - 75 %    LYMPHOCYTES 7 (L) 12 - 49 %    MONOCYTES 5 5 - 13 %    EOSINOPHILS 1 0 - 7 %    BASOPHILS 0 0 - 1 %    ABS. NEUTROPHILS 12.2 (H) 1.8 - 8.0 K/UL    ABS.  LYMPHOCYTES 1.0 0.8 - 3.5 K/UL    ABS. MONOCYTES 0.6 0.0 - 1.0 K/UL    ABS. EOSINOPHILS 0.1 0.0 - 0.4 K/UL    ABS. BASOPHILS 0.0 0.0 - 0.1 K/UL   METABOLIC PANEL, COMPREHENSIVE    Collection Time: 02/16/17  1:42 PM   Result Value Ref Range    Sodium 140 136 - 145 mmol/L    Potassium 4.1 3.5 - 5.1 mmol/L    Chloride 106 97 - 108 mmol/L    CO2 23 21 - 32 mmol/L    Anion gap 11 5 - 15 mmol/L    Glucose 292 (H) 65 - 100 mg/dL    BUN 53 (H) 6 - 20 MG/DL    Creatinine 3.15 (H) 0.55 - 1.02 MG/DL    BUN/Creatinine ratio 17 12 - 20      GFR est AA 18 (L) >60 ml/min/1.73m2    GFR est non-AA 15 (L) >60 ml/min/1.73m2    Calcium 8.4 (L) 8.5 - 10.1 MG/DL    Bilirubin, total 0.4 0.2 - 1.0 MG/DL    ALT (SGPT) 23 12 - 78 U/L    AST (SGOT) 8 (L) 15 - 37 U/L    Alk. phosphatase 62 45 - 117 U/L    Protein, total 6.3 (L) 6.4 - 8.2 g/dL    Albumin 2.9 (L) 3.5 - 5.0 g/dL    Globulin 3.4 2.0 - 4.0 g/dL    A-G Ratio 0.9 (L) 1.1 - 2.2     PRO-BNP    Collection Time: 02/16/17  1:42 PM   Result Value Ref Range    NT pro- (H) 0 - 125 PG/ML   TROPONIN I    Collection Time: 02/16/17  1:42 PM   Result Value Ref Range    Troponin-I, Qt. 0.04 <0.05 ng/mL   PROTHROMBIN TIME + INR    Collection Time: 02/16/17  1:42 PM   Result Value Ref Range    INR 6.1 (HH) 0.9 - 1.1      Prothrombin time 64.7 (H) 9.0 - 11.1 sec       Imaging  CXR: No significant interval change in bilateral lower lobe and right  upper lobe airspace opacification. EKG:  NSR. HR 85; Axis: normal; P wave: normal; QRS interval: normal ; ST/T wave: non-specific changes. Assessment and Plan   David Rabago is a 64 yo female PMH of HFpEF, HTN, CAD s/p stent, DVT, CKD4, ILD, GERD, IDDM admitted for CHF vs COPD exacerbation. CHF vs COPD exacerbation: Presented with chest tightness, SOB, weight gain (>20lbs in 6 days), worsening RAMESH. Elevated pro-bnp, negative troponin, last ECHO from 2/2 was EF of 75%.  Mild wheezing, fine crackles at right lung base, no JVD, no respiratory distress, on baseline 2L NC.   -Admit to telemetry  -Duo-nebs and nitrostat   -O2 as needed   -1 mg bumex for now and can continue bumex 1mg BID   -Daily weights, strict I&O, fluid restriction  -Consult to cardiology     HTN: BP elevated at 173/70. On imdur, coreg, norvasc and bumex at home.   -Contniue imdur, norvasc and bumex.  -Hold coreg for now  -Hydralazine 25mg TID PRN added for BP > 150/90  -Monitor BP closely     CKD Stage 4: Cr on admission 3.15. Baseline ~3.5.   -Caution with nephrotoxic drugs  -Consulted to nephrology   -Daily BMP while on bumex    IDDM: Glucose elevated at 292 on admission. On SSI 70/30 at home. Reports elevated glucoses due to steroid taper she was discharged on. -SSI TID for now  -POC glucose checks ACHS    Coagulopathy: INR 6.1 on admission down to 5.4 2 hours later. On 2/11 was 2.3.  -Holding coumadin for now. -DVT ppx with SCDs  -INR daily     CAD s/p stent: Plavix held during last visit. Initial troponin negative. -ASA 81mg daily   -F/U trop x 2    Constipation: Seems chronic. Given dulcolax and miralax upon previous discharge. Pt states last bowel movement was 2 weeks ago. -Dulcolax   -Consider enemas if no BM soon    GERD: Stable. -Continue protonix 40mg daily  -Continue Carafate 1g QID     Morbid Obesity: BMI 51.2. >20lb weight gain in 6 days. -Monitor daily weights  -Encourage weight loss     FEN/GI - Cardiac diet. Fluid restrict 1500mL  Activity - Up with assistance  DVT prophylaxis - SCDs  GI prophylaxis -  Protonix   Disposition - Plan to d/c to home    CODE STATUS:  DNR       Patient discussed with Dr. Dre Agudelo, attending physician.        Cherylene Haymaker, MD  Family Medicine Resident

## 2017-02-16 NOTE — IP AVS SNAPSHOT
Marian Rhodes 
 
 
 [address] [facility] 
[phone] Patient: Chica Thompson MRN: [id number] CVV:[id number] You are allergic to the following Allergen Reactions Contrast Dye (Iodine) Anaphylaxis Levaquin (Levofloxacin) Nausea and Vomiting Morphine Hives Itching Recent Documentation Height Weight BMI OB Status Smoking Status 1.778 m 158.5 kg 50.14 kg/m2 Hysterectomy Former Smoker Unresulted Labs Order Current Status TYPE & CROSSMATCH Preliminary result Emergency Contacts Name Discharge Info Relation Home Work Mobile [address] [place] [address] [place] CAREGIVER [3] Spouse [3] [phone] Shruthi Cotter  Spouse [3] [phone] About your hospitalization You were admitted on:  February 16, 2017 You last received care in the:  [facility] [address] CARE TELE 2 You were discharged on:  February 20, 2017 Unit phone number:  [phone] Why you were hospitalized Your primary diagnosis was:  Chf Exacerbation (Hcc) Your diagnoses also included:  Htn (Hypertension), Morbid Obesity (Hcc), Dm (Diabetes Mellitus), Type 2 With Renal Complications ([facility]), Ckd (Chronic Kidney Disease) Stage 4, Gfr 15-29 Ml/Min ([facility]), Esophageal Reflux Providers Seen During Your Hospitalizations Provider Role Specialty Primary office phone Anya Barbosa MD Attending Provider Emergency Medicine [phone] Rebekah Li DO Attending Provider [facility] [phone] Gabe Cool MD Attending Provider [facility] [phone] Your Primary Care Physician (PCP) Primary Care Physician Office Phone Office Fax NONE ** None ** ** None ** Follow-up Information Follow up With Details Comments Contact Info None   None (395) Patient stated that they have no PCP  Henny Levine DO Go on 2/23/2017 Thursday, 2/23 a 2:00pm [facility] follow-up, INR, CBC, CMP check, F/U of renal cyst [address] [address] [address] [id number] [phone] Your Appointments Thursday February 23, 2017  2:00 PM EST  
ESTABLISHED PATIENT with [facility], DO [address] [facility] ([address]) [address] [address] [address] [address]  
[phone] Current Discharge Medication List  
  
START taking these medications Dose & Instructions Dispensing Information Comments Morning Noon Evening Bedtime  
 hydrALAZINE 25 mg tablet Commonly known as:  APRESOLINE Your next dose is: Today, Tomorrow Other:  _________ Dose:  25 mg Take 1 Tab by mouth three (3) times daily. Quantity:  90 Tab Refills:  0 CONTINUE these medications which have CHANGED Dose & Instructions Dispensing Information Comments Morning Noon Evening Bedtime  
 bumetanide 2 mg tablet Commonly known as:  Buelah Bake What changed:   
- medication strength 
- how much to take Your next dose is: Today, Tomorrow Other:  _________ Dose:  2 mg Take 1 Tab by mouth two (2) times a day. Quantity:  60 Tab Refills:  0  
     
   
   
   
  
 * polyethylene glycol 17 gram packet Commonly known as:  Elise Abraham What changed:   
- when to take this 
- reasons to take this Your next dose is: Today, Tomorrow Other:  _________ Dose:  17 g Take 1 Packet by mouth daily. Quantity:  14 Packet Refills:  0  
     
   
   
   
  
 * polyethylene glycol 17 gram packet Commonly known as:  Elise Abraham What changed: You were already taking a medication with the same name, and this prescription was added. Make sure you understand how and when to take each. Your next dose is: Today, Tomorrow Other:  _________ Dose:  17 g Take 1 Packet by mouth daily. Quantity:  15 Packet Refills:  0 * senna-docusate 8.6-50 mg per tablet Commonly known as:  Juanda Alyssa What changed:  Another medication with the same name was added. Make sure you understand how and when to take each. Your next dose is: Today, Tomorrow Other:  _________ Dose:  2 Tab Take 2 Tabs by mouth daily. Quantity:  28 Tab Refills:  0  
     
   
   
   
  
 * senna-docusate 8.6-50 mg per tablet Commonly known as:  Juanda Alyssa What changed: You were already taking a medication with the same name, and this prescription was added. Make sure you understand how and when to take each. Your next dose is: Today, Tomorrow Other:  _________ Dose:  2 Tab Take 2 Tabs by mouth daily. Quantity:  20 Tab Refills:  0  
     
   
   
   
  
 warfarin 2 mg tablet Commonly known as:  COUMADIN What changed:   
- medication strength 
- how much to take Your next dose is: Today, Tomorrow Other:  _________ Dose:  2 mg Take 1 Tab by mouth daily. Quantity:  15 Tab Refills:  0  
     
   
   
   
  
 * Notice: This list has 4 medication(s) that are the same as other medications prescribed for you. Read the directions carefully, and ask your doctor or other care provider to review them with you. CONTINUE these medications which have NOT CHANGED Dose & Instructions Dispensing Information Comments Morning Noon Evening Bedtime  
 albuterol 90 mcg/actuation inhaler Commonly known as:  PROVENTIL HFA, VENTOLIN HFA, PROAIR HFA Your next dose is: Today, Tomorrow Other:  _________ Dose:  2 Puff Take 2 Puffs by inhalation every four (4) hours as needed for Wheezing. Refills:  0  
     
   
   
   
  
 aspirin 81 mg tablet Your next dose is: Today, Tomorrow Other:  _________ Dose:  81 mg Take 81 mg by mouth daily. Refills:  0  
     
   
   
   
  
 atorvastatin 80 mg tablet Commonly known as:  LIPITOR Your next dose is: Today, Tomorrow Other:  _________ Dose:  80 mg Take 80 mg by mouth every evening. Refills:  0 BACTROBAN 2 % ointment Generic drug:  mupirocin Your next dose is: Today, Tomorrow Other:  _________ Apply  to affected area two (2) times daily as needed (lesions on feet when needed). Ointment external two times daily to lesions on feet until healed - now using as needed Refills:  0  
     
   
   
   
  
 calcitRIOL 0.25 mcg capsule Commonly known as:  ROCALTROL Your next dose is: Today, Tomorrow Other:  _________ Dose:  0.25 mcg Take 0.25 mcg by mouth two (2) days a week. Patient takes on Monday and Thursday Refills:  0  
     
   
   
   
  
 CARAFATE 1 gram tablet Generic drug:  sucralfate Your next dose is: Today, Tomorrow Other:  _________ Dose:  1 g Take 1 g by mouth Before breakfast, lunch, dinner and at bedtime. Refills:  0  
     
   
   
   
  
 carvedilol 25 mg tablet Commonly known as:  Cori Cristine Your next dose is: Today, Tomorrow Other:  _________ TAKE 1 TABLET BY MOUTH TWICE A DAY Quantity:  180 Tab Refills:  3 DUONEB 2.5 mg-0.5 mg/3 ml Nebu Generic drug:  albuterol-ipratropium Your next dose is: Today, Tomorrow Other:  _________ Dose:  3 mL  
3 mL by Nebulization route three (3) times daily. Refills:  0  
     
   
   
   
  
 FLONASE 50 mcg/actuation nasal spray Generic drug:  fluticasone Your next dose is: Today, Tomorrow Other:  _________ Dose:  2 Spray 2 Sprays by Both Nostrils route nightly. Refills:  0 HUMALOG SC Your next dose is: Today, Tomorrow Other:  _________  
   
   
 by SubCUTAneous route Before breakfast, lunch, and dinner.  Sliding scale, stated usually using around 60units three times daily Refills:  0  
     
   
   
   
  
 isosorbide mononitrate  mg CR tablet Commonly known as:  IMDUR Your next dose is: Today, Tomorrow Other:  _________ TAKE 1 TABLET BY MOUTH EVERY DAY Quantity:  30 Tab Refills:  5  
     
   
   
   
  
 linaclotide 290 mcg Cap capsule Commonly known as:  Genevive Tucker Your next dose is: Today, Tomorrow Other:  _________ Dose:  290 mcg Take 1 Cap by mouth Daily (before breakfast) for 10 days. Quantity:  10 Cap Refills:  0  
     
   
   
   
  
 nitroglycerin 0.3 mg SL tablet Commonly known as:  NITROSTAT Your next dose is: Today, Tomorrow Other:  _________ Dose:  0.4 mg  
1 Tab by SubLINGual route every five (5) minutes as needed for Chest Pain. Quantity:  1 Bottle Refills:  1  
     
   
   
   
  
 ondansetron 4 mg disintegrating tablet Commonly known as:  ZOFRAN ODT Your next dose is: Today, Tomorrow Other:  _________ Dose:  4 mg Take 1 Tab by mouth every six (6) hours as needed for Nausea. Quantity:  30 Tab Refills:  0  
     
   
   
   
  
 oxyCODONE-acetaminophen 7.5-325 mg per tablet Commonly known as:  PERCOCET 7.5 Your next dose is: Today, Tomorrow Other:  _________ Dose:  1 Tab Take 1 Tab by mouth every four (4) hours as needed. Refills:  0  
     
   
   
   
  
 pantoprazole 40 mg tablet Commonly known as:  PROTONIX Your next dose is: Today, Tomorrow Other:  _________ Dose:  40 mg Take 1 Tab by mouth daily. Indications: GASTROESOPHAGEAL REFLUX Quantity:  90 Tab Refills:  1  
     
   
   
   
  
 predniSONE 10 mg tablet Commonly known as:  Darlin Hawks Your next dose is: Today, Tomorrow Other:  _________ Take 30mg for three days then 20mg for three days then 10mg for three days Quantity:  18 Tab Refills:  0  
     
   
   
   
  
 VITAMIN D2 50,000 unit capsule Generic drug:  ergocalciferol Your next dose is: Today, Tomorrow Other:  _________ Dose:  [address] Units Take 50,000 Units by mouth every Tuesday and Thursday. Refills:  0 STOP taking these medications NORVASC 10 mg tablet Generic drug:  amLODIPine Where to Get Your Medications These medications were sent to [facility], [address] [facility]  [address] [facility], [address] [place] [address] Hours:  24-hours Phone:  [phone]  
  bumetanide 2 mg tablet  
 hydrALAZINE 25 mg tablet  
 linaclotide 290 mcg Cap capsule  
 polyethylene glycol 17 gram packet  
 senna-docusate 8.6-50 mg per tablet  
 warfarin 2 mg tablet Discharge Instructions HOME DISCHARGE INSTRUCTIONS Nadiya Dhillon / [id number] : 1955 Admission date: 2017 Discharge date: 2017 Please bring this form with you to show your care provider at your follow-up appointment. Primary care provider:  None Discharging provider:  Monserrat Chatmna MD  - Family Medicine Resident Selene Coffman MD - Attending, Family Medicine You have been admitted to the hospital with the following diagnoses: 
 
ACUTE DIAGNOSES: 
CHF exacerbation ([facility] [address] Estiven Richardson . . . . . . . . . . . . . . . . . . . . . . . . . . . . . . . . . . . . . . . . . . . . . . . . . . . . . . . . . . . . . . . . . . . . . . . Medications: 
-STOP: Norvasc. START: Hydralazine 25mg 1 pill every 8 hours. This is for your high Blood pressure. 
-Change in medications: Bumex increased from 1mg twice daily to 2 mg twice daily. Warfarin decreased from 4mg daily to 2mg daily 
-START Miralax once daily and pericolace twice daily as needed for constipation.  
-Scripts for miralax, pericolace, hydralazine, warfarin, and bumex all sent directly to your pharmacy. -it is very important that you visit your PCP in 2 days for a hospital follow-up and to get an INR checked and adjust warfarin as needed. FOLLOW-UP CARE RECOMMENDATIONS: 
 
Appointments Follow-up Information Follow up With Details Comments Contact Info None   None (395) Patient stated that they have no PCP Wayne Levine, DO Schedule an appointment as soon as possible for a visit in 2 days hospital follow-up, INR, CBC, CMP check, [facility] [address] [address] [place] [id number] [phone] Follow-up tests needed: INR, CBC, CMP Pending test results: At the time of your discharge the following test results are still pending:   
Please make sure you review these results with your outpatient follow-up provider(s). Specific symptoms to watch for: chest pain, shortness of breath, fever, chills, nausea, vomiting, diarrhea, change in mentation, falling, weakness, bleeding. What to do if new or unexpected symptoms occur? If you experience any of the above symptoms (or should other concerns or questions arise after discharge) please call your primary care physician. Return to the emergency room if you cannot get hold of your doctor. · It is very important that you keep your follow-up appointment(s). · Please bring discharge papers, medication list (and/or medication bottles) to your follow-up appointments for review by your outpatient provider(s). · Please check the list of medications and be sure it includes every medication (even non-prescription medications) that your provider wants you to take. · It is important that you take the medication exactly as they are prescribed. · Keep your medication in the bottles provided by the pharmacist and keep a list of the medication names, dosages, and times to be taken in your wallet. · Do not take other medications without consulting your doctor. · If you have any questions about your medications or other instructions, please talk to your nurse or care provider before you leave the hospital.  
 
Information obtained by:  
 
I understand that if any problems occur once I am at home I am to contact my physician. These instructions were explained to me and I had the opportunity to ask questions. I understand and acknowledge receipt of the instructions indicated above. Physician's or R.N.'s Signature                                                                  Date/Time Patient or Representative Signature                                                          Date/Time Limiting Sodium and Fluids With Heart Failure: Care Instructions Your Care Instructions Sodium causes your body to keep extra water, making it harder for your heart to pump. By limiting sodium, you will feel better and lower your risk of having to go to the hospital. 
People get most of their sodium from processed foods. Fast food and restaurant meals also tend to be very high in sodium. Your doctor may suggest that you limit sodium to 2,000 milligrams (mg) a day or less. That is less than 1 teaspoon of salt a day, including all the salt you eat in cooked or packaged foods. Usually, you have to limit the amount of liquids you drink only if your heart failure is severe. Limiting sodium alone often is enough to help your body get rid of extra fluids. However, your doctor may tell you to limit your fluid intake to a set amount each day. Follow-up care is a key part of your treatment and safety.  Be sure to make and go to all appointments, and call your doctor if you are having problems. It's also a good idea to know your test results and keep a list of the medicines you take. How can you care for yourself at home? Read food labels · Read food labels on cans and food packages. The labels tell you how much sodium is in each serving. Make sure that you look at the serving size. If you eat more than the serving size, you have eaten more sodium than is listed for one serving. · Food labels also tell you the Percent Daily Value. If the Percent Daily Value says 50%, it means that you will get at least 50% of all the sodium you need for the entire day in one serving. Choose products with low Percent Daily Values for sodium. · Be aware that sodium can come in forms other than salt, including monosodium glutamate (MSG), sodium citrate, and sodium bicarbonate (baking soda). MSG is often added to Asian food. You can sometimes ask for food without MSG or salt. Buy low-sodium foods · Buy foods that are labeled \"unsalted\" (no salt added), \"sodium-free\" (less than 5 mg of sodium per serving), or \"low-sodium\" (less than 140 mg of sodium per serving). A food labeled \"light sodium\" has less than half of the full-sodium version of that food. Foods labeled \"reduced-sodium\" may still have too much sodium. · Buy fresh vegetables or plain, frozen vegetables. Buy low-sodium versions of canned vegetables, soups, and other canned goods. Prepare low-sodium meals · Use less salt each day when cooking. Reducing salt in this way will help you adjust to the taste. Do not add salt after cooking. Take the salt shaker off the table. · Flavor your food with garlic, lemon juice, onion, vinegar, herbs, and spices instead of salt. Do not use soy sauce, steak sauce, onion salt, garlic salt, mustard, or ketchup on your food. · Make your own salad dressings, sauces, and ketchup without adding salt. · Use less salt (or none) when recipes call for it.  You can often use half the salt a recipe calls for without losing flavor. Other dishes like rice, pasta, and grains do not need added salt. · Rinse canned vegetables. This removes somebut not allof the salt. · Avoid water that has a naturally high sodium content or that has been treated with water softeners, which add sodium. Call your local water company to find out the sodium content of your water supply. If you buy bottled water, read the label and choose a sodium-free brand. Avoid high-sodium foods, such as: 
· Smoked, cured, salted, and canned meat, fish, and poultry. · Ham, medley, hot dogs, and luncheon meats. · Regular, hard, and processed cheese and regular peanut butter. · Crackers with salted tops. · Frozen prepared meals. · Canned and dried soups, broths, and bouillon, unless labeled sodium-free or low-sodium. · Canned vegetables, unless labeled sodium-free or low-sodium. · Salted snack foods such as chips and pretzels. · [place] fries, pizza, tacos, and other fast foods. · Pickles, olives, ketchup, and other condiments, especially soy sauce, unless labeled sodium-free or low-sodium. If you cannot cook for yourself · Have family members or friends help you, or have someone cook low-sodium meals. · Check with your local senior nutrition program to find out where meals are served and whether they offer a low-sodium option. You can often find these programs through your local health department or hospital. 
· Have meals delivered to your home. Most [facility] have a [employer]. These programs provide one hot meal a day for older adults, delivered to their homes. Ask whether these meals are low-sodium. Let them know that you are on a low-sodium diet. Limiting fluid intake · Find a method that works for you. You might simply write down how much you drink every time you do. Some people keep a container filled with the amount of fluid allowed for that day.  If they drink from a source other than the container, then they pour out that amount. · Measure your regular drinking glasses to find out how much fluid each one holds. Once you know this, you will not have to measure every time. · Besides water, milk, juices, and other drinks, some foods have a lot of fluid. Count any foods that will melt (such as ice cream or gelatin dessert) or liquid foods (such as soup) as part of your fluid intake for the day. Where can you learn more? Go to http://lindsay-kai.info/. Enter A166 in the search box to learn more about \"Limiting Sodium and Fluids With Heart Failure: Care Instructions. \" Current as of: January 27, 2016 Content Version: 11.1 © 6792-0230 [employer]. Care instructions adapted under license by [employer] (which disclaims liability or warranty for this information). If you have questions about a medical condition or this instruction, always ask your healthcare professional. [place] [address] any warranty or liability for your use of this information. High INR Test Result: Care Instructions Your Care Instructions You had a blood test to check how long it takes your blood to clot. This test is called a PT or prothrombin time test. The result of the test is called the INR level. A high INR level can happen when you take warfarin (Coumadin). Warfarin helps prevent blood clots. To do this, it slows the amount of time it takes for your blood to clot. This raises your INR level. The INR goal for people who take warfarin is usually from 2 to 3. A value higher than 3.5 increases the risk of bleeding problems. Many things can affect the way warfarin works. Some natural health products and other medicines can make warfarin work too well. That can raise the risk of bleeding. If you drink a lot of alcohol, that may raise your INR. And severe diarrhea or vomiting can also raise your INR. The best way to lower your INR will depend on several things. In some cases, the doctor may have you stop taking warfarin for a few days. You may also be given other medicines to take. You will need to be tested often to make sure your INR level is going down. You will also need to watch for signs of bleeding. The doctor has checked you carefully, but problems can develop later. If you notice any problems or new symptoms, get medical treatment right away. Follow-up care is a key part of your treatment and safety. Be sure to make and go to all appointments, and call your doctor if you are having problems. It's also a good idea to know your test results and keep a list of the medicines you take. How can you care for yourself at home? Be careful with medicines and foods · Don't start or stop taking any medicines, vitamins, or natural remedies unless you first talk to your doctor. · Keep the amount of vitamin K in your diet about the same from day to day. Do not suddenly eat a lot more or a lot less food that is rich in vitamin K than you usually do. Vitamin K affects how warfarin works and how your blood clots. · Avoid cranberry juice and other cranberry products. They can increase the effects of warfarin. · Limit your use of alcohol. Avoid bleeding by preventing falls · Wear slippers or shoes with nonskid soles. · Remove throw rugs and clutter. · Rearrange furniture and electrical cords to keep them out of walking paths. · Keep stairways, porches, and outside walkways well lit. Use night-lights in hallways and bathrooms. · Be extra careful when you work with sharp tools or knives. When should you call for help? Call 911 anytime you think you may need emergency care. For example, call if: 
· You have a sudden, severe headache that is different from past headaches. Call your doctor now or seek immediate medical care if: 
· You have any abnormal bleeding, such as: 
¨ Nosebleeds. ¨ Vaginal bleeding that is different (heavier, more frequent, at a different time of the month) than what you are used to. ¨ Bloody or black stools, or rectal bleeding. ¨ Bloody or pink urine. Watch closely for changes in your health, and be sure to contact your doctor if you have any problems. Where can you learn more? Go to http://lindsay-kai.info/. Enter C178 in the search box to learn more about \"High INR Test Result: Care Instructions. \" Current as of: July 28, 2016 Content Version: 11.1 © 1359-1355 [employer]. Care instructions adapted under license by [employer] (which disclaims liability or warranty for this information). If you have questions about a medical condition or this instruction, always ask your healthcare professional. [address] any warranty or liability for your use of this information. Chronic Diastolic Congestive Heart Failure (CHF):  
Your Care Instructions to Avoid Triggers With Heart Failure Triggers are anything that make your heart failure flare up. A flare-up is also called \"sudden heart failure\" or \"acute heart failure. \" When you have a flare-up, fluid builds up in your lungs, and you have problems breathing. You might need to go to the hospital. By watching for changes in your condition and avoiding triggers, you can prevent heart failure flare-ups. Follow-up care is a key part of your treatment and safety. Be sure to make and go to all appointments, and call your doctor if you are having problems. It's also a good idea to know your test results and keep a list of the medicines you take. How can you care for yourself at home? Watch for changes in your weight and condition · Weigh yourself without clothing at the same time each day. Record your weight. Call your doctor if you gain 3 pounds or more in 24 hrs or 5 pounds in one week.   A sudden weight gain may mean that your heart failure is getting worse. · Keep a daily record of your symptoms. Write down any changes in how you feel, such as new shortness of breath, cough, or problems eating. Also record if your ankles are more swollen than usual and if you have to urinate in the night more often. Note anything that you ate or did that could have triggered these changes. Limit sodium Sodium causes your body to hold on to water, making it harder for your heart to pump. People get most of their sodium from processed foods. Fast food and restaurant meals also tend to be very high in sodium. · Your doctor may suggest that you limit sodium to 1,500 milligrams (mg) a day. That is less than 1 teaspoon of salt a day, including all the salt you eat in cooking or in packaged foods. · Read food labels on cans and food packages. They tell you how much sodium you get in one serving. Check the serving size. If you eat more than one serving, you are getting more sodium. · Be aware that sodium can come in forms other than salt, including monosodium glutamate (MSG), sodium citrate, and sodium bicarbonate (baking soda). MSG is often added to Asian food. You can sometimes ask for food without MSG or salt. · Slowly reducing salt will help you adjust to the taste. Take the salt shaker off the table. · Flavor your food with garlic, lemon juice, onion, vinegar, herbs, and spices instead of salt. Do not use soy sauce, steak sauce, onion salt, garlic salt, mustard, or ketchup on your food, unless it is labeled \"low-sodium\" or \"low-salt. \" 
· Make your own salad dressings, sauces, and ketchup without adding salt. · Use fresh or frozen ingredients, instead of canned ones, whenever you can. Choose low-sodium canned goods. · Eat less processed food and food from restaurants, including fast food. Exercise as directed Moderate, regular exercise is very good for your heart. It improves your blood flow and helps control your weight.  But too much exercise can stress your heart and cause a heart failure flare-up. · Check with your doctor before you start an exercise program. 
· Walking is an easy way to get exercise. Start out slowly. Gradually increase the length and pace of your walk. Swimming, riding a bike, and using a treadmill are also good forms of exercise. · When you exercise, watch for signs that your heart is working too hard. You are pushing yourself too hard if you cannot talk while you are exercising. If you become short of breath or dizzy or have chest pain, stop, sit down, and rest. 
· Do not exercise when you do not feel well. Take medicines correctly · Take your medicines exactly as prescribed. Call your doctor if you think you are having a problem with your medicine. · Make a list of all the medicines you take. Include those prescribed to you by other doctors and any over-the-counter medicines, vitamins, or supplements you take. Take this list with you when you go to any doctor. · Take your medicines at the same time every day. It may help you to post a list of all the medicines you take every day and what time of day you take them. · Make taking your medicine as simple as you can. Plan times to take your medicines when you are doing other things, such as eating a meal or getting ready for bed. This will make it easier to remember to take your medicines. · Get organized. Use helpful tools, such as daily or weekly pill containers. When should you call for help? Call 911 if you have symptoms of sudden heart failure such as: 
· You have severe trouble breathing. · You cough up pink, foamy mucus. · You have a new irregular or rapid heartbeat. Call your doctor now or seek immediate medical care if: 
· You have new or increased shortness of breath. · You are dizzy or lightheaded, or you feel like you may faint. · You have sudden weight gain, such as 3 pounds in 24 hours, or 5 pounds in one week. · You have increased swelling in your legs, ankles, or feet. · You are suddenly so tired or weak that you cannot do your usual activities. Watch closely for changes in your health, and be sure to contact your doctor if you develop new symptoms. Where can you learn more? Go to http://lindsay-kai.info/ Enter I657 in the search box to learn more about \"Avoiding Triggers With Heart Failure: Care Instructions. \" 
© 6128-3927 Healthwise, Incorporated. Care instructions adapted under license by [employer] (which disclaims liability or warranty for this information). This care instruction is for use with your licensed healthcare professional. If you have questions about a medical condition or this instruction, always ask your healthcare professional. [address] any warranty or liability for your use of this information. Content Version: 76.7.102361; Current as of: January 27, 2016 (modified 10/10/16). Discharge Orders None [email or web address] Announcement We are excited to announce that we are making your provider's discharge notes available to you in [email or web address]. You will see these notes when they are completed and signed by the physician that discharged you from your recent hospital stay. If you have any questions or concerns about any information you see in [email or web address], please call the Health Information Department where you were seen or reach out to your Primary Care Provider for more information about your plan of care. Introducing \[facility] [place] [facility]\"" & HEALTH SERVICES! Dear Marely Pastrana: Thank you for requesting a [email or web address] account. Our records indicate that you already have an active [email or web address] account. You can access your account anytime at [email or web address] Did you know that you can access your hospital and ER discharge instructions at any time in [email or web address]?   You can also review all of your test results from your hospital stay or ER visit. Additional Information If you have questions, please visit the Frequently Asked Questions section of the [employer] website at [email or web address] Remember, MyChart is NOT to be used for urgent needs. For medical emergencies, dial 911. Now available from your iPhone and Android! General Information Please provide this summary of care documentation to your next provider. Patient Signature:  ____________________________________________________________ Date:  ____________________________________________________________  
  
Aloma Snellen Provider Signature:  ____________________________________________________________ Date:  ____________________________________________________________

## 2017-02-16 NOTE — PROGRESS NOTES
2/16//2017 CARE MANAGEMENT NOTE: CM is following pt for READMISSION. EMR reviewed. Per chart hx, pt was admitted to [facility] from 2/2/17 - 2/10/17. She returned home without home services. Initial Assessment:  Reportedly, pt resides with her  in a one story home. PTA, pt was ambulatory, indepn with ADLs, and drives. She is unemployed and on disability. Pt has RX drug coverage and she uses [facility] pharmacy on [address] Pt does not have home healthcare currently although she states that she may benefit from skilled nursing visits (she does not have a preference of agency). DME in the home includes a cane, rolling walker, nebulizer, Cpap, BSC, shower chair, and oxygen. Pt reports that she is changing from [facility] to [address] [place] [address] as the former company does not have tanks that last over 2 hrs. PCP - none. She was given a list of area providers. During previous hospitalization [facility] provided a rollator for home use. Currently, she only has specialists for medical f/u. Readmission Assessment:  Pt presents to Dr. Jorge Aquino office today to establish new PCP. She reports gaining around 30 lbs since her discharge from the hospital on 2/10. Pt came to the ER with SOB and will be readmitted for further medical management. Action Plan:  Close f/u and home health services post discharge.   Kamilah

## 2017-02-16 NOTE — PROGRESS NOTES
2/16/17, Patient in office to establish PCP with Dr Hunter Mariee, reports recent [facility] hospitalization and ED visit. Dr Hunter Mariee contacted this writer/NN with concerns of 30 pound weight gain since discharge, INR at 5. 4(recent DVT on coumadin), Diabetes, CHF,and CKD, recommends patient return to [facility] for further medical evaluation. This writer/NN met with patient and  to discuss NN role and offer further assistance with coordination of care. Patient verbalizes understanding, agrees to return to [facility], prefers  to drive at this time/not wanting ambulance transport at this time. Patient allows this writer/NN to continue to follow and assist as needed. This writer/NN contact office of Dr Foster Staff, NN to discuss above hospitalization and concerns. Glencoe Sourav agrees to notify the team. This writer/NN also contacted [facility]/ED Triage to alert team of above concerns and pending transport. Will continue to monitor for discharge.

## 2017-02-16 NOTE — IP AVS SNAPSHOT
Current Discharge Medication List  
  
Take these medications at their scheduled times Dose & Instructions Dispensing Information Comments Morning Noon Evening Bedtime  
 aspirin 81 mg tablet Your next dose is: Today, Tomorrow Other:  ____________ Dose:  81 mg Take 81 mg by mouth daily. Refills:  0  
     
   
   
   
  
 atorvastatin 80 mg tablet Commonly known as:  LIPITOR Your next dose is: Today, Tomorrow Other:  ____________ Dose:  80 mg Take 80 mg by mouth every evening. Refills:  0  
     
   
   
   
  
 bumetanide 2 mg tablet Commonly known as:  Marcheta Mocha Your next dose is: Today, Tomorrow Other:  ____________ Dose:  2 mg Take 1 Tab by mouth two (2) times a day. Quantity:  60 Tab Refills:  0  
     
   
   
   
  
 calcitRIOL 0.25 mcg capsule Commonly known as:  ROCALTROL Your next dose is: Today, Tomorrow Other:  ____________ Dose:  0.25 mcg Take 0.25 mcg by mouth two (2) days a week. Patient takes on Monday and Thursday Refills:  0  
     
   
   
   
  
 CARAFATE 1 gram tablet Generic drug:  sucralfate Your next dose is: Today, Tomorrow Other:  ____________ Dose:  1 g Take 1 g by mouth Before breakfast, lunch, dinner and at bedtime. Refills:  0 DUONEB 2.5 mg-0.5 mg/3 ml Nebu Generic drug:  albuterol-ipratropium Your next dose is: Today, Tomorrow Other:  ____________ Dose:  3 mL  
3 mL by Nebulization route three (3) times daily. Refills:  0  
     
   
   
   
  
 FLONASE 50 mcg/actuation nasal spray Generic drug:  fluticasone Your next dose is: Today, Tomorrow Other:  ____________ Dose:  2 Spray 2 Sprays by Both Nostrils route nightly. Refills:  0 HUMALOG SC Your next dose is: Today, Tomorrow Other:  ____________ by SubCUTAneous route Before breakfast, lunch, and dinner. Sliding scale, stated usually using around 60units three times daily Refills:  0  
     
   
   
   
  
 hydrALAZINE 25 mg tablet Commonly known as:  APRESOLINE Your next dose is: Today, Tomorrow Other:  ____________ Dose:  25 mg Take 1 Tab by mouth three (3) times daily. Quantity:  90 Tab Refills:  0  
     
   
   
   
  
 linaclotide 290 mcg Cap capsule Commonly known as:  Genevive Tucker Your next dose is: Today, Tomorrow Other:  ____________ Dose:  290 mcg Take 1 Cap by mouth Daily (before breakfast) for 10 days. Quantity:  10 Cap Refills:  0  
     
   
   
   
  
 pantoprazole 40 mg tablet Commonly known as:  PROTONIX Your next dose is: Today, Tomorrow Other:  ____________ Dose:  40 mg Take 1 Tab by mouth daily. Indications: GASTROESOPHAGEAL REFLUX Quantity:  90 Tab Refills:  1  
     
   
   
   
  
 * polyethylene glycol 17 gram packet Commonly known as:  Duane Gallatin Your next dose is: Today, Tomorrow Other:  ____________ Dose:  17 g Take 1 Packet by mouth daily. Quantity:  14 Packet Refills:  0  
     
   
   
   
  
 * polyethylene glycol 17 gram packet Commonly known as:  Duane Gallatin Your next dose is: Today, Tomorrow Other:  ____________ Dose:  17 g Take 1 Packet by mouth daily. Quantity:  15 Packet Refills:  0  
     
   
   
   
  
 * senna-docusate 8.6-50 mg per tablet Commonly known as:  Roselinda Nora Your next dose is: Today, Tomorrow Other:  ____________ Dose:  2 Tab Take 2 Tabs by mouth daily. Quantity:  28 Tab Refills:  0  
     
   
   
   
  
 * senna-docusate 8.6-50 mg per tablet Commonly known as:  Roselinda Nora Your next dose is: Today, Tomorrow Other:  ____________ Dose:  2 Tab Take 2 Tabs by mouth daily. Quantity:  20 Tab Refills:  0  
     
   
   
   
  
 VITAMIN D2 50,000 unit capsule Generic drug:  ergocalciferol Your next dose is: Today, Tomorrow Other:  ____________ Dose:  78396 Units Take 50,000 Units by mouth every Tuesday and Thursday. Refills:  0  
     
   
   
   
  
 warfarin 2 mg tablet Commonly known as:  COUMADIN Your next dose is: Today, Tomorrow Other:  ____________ Dose:  2 mg Take 1 Tab by mouth daily. Quantity:  15 Tab Refills:  0  
     
   
   
   
  
 * Notice: This list has 4 medication(s) that are the same as other medications prescribed for you. Read the directions carefully, and ask your doctor or other care provider to review them with you. Take these medications as needed Dose & Instructions Dispensing Information Comments Morning Noon Evening Bedtime  
 albuterol 90 mcg/actuation inhaler Commonly known as:  PROVENTIL HFA, VENTOLIN HFA, PROAIR HFA Your next dose is: Today, Tomorrow Other:  ____________ Dose:  2 Puff Take 2 Puffs by inhalation every four (4) hours as needed for Wheezing. Refills:  0 BACTROBAN 2 % ointment Generic drug:  mupirocin Your next dose is: Today, Tomorrow Other:  ____________ Apply  to affected area two (2) times daily as needed (lesions on feet when needed). Ointment external two times daily to lesions on feet until healed - now using as needed Refills:  0  
     
   
   
   
  
 nitroglycerin 0.3 mg SL tablet Commonly known as:  NITROSTAT Your next dose is: Today, Tomorrow Other:  ____________ Dose:  0.4 mg  
1 Tab by SubLINGual route every five (5) minutes as needed for Chest Pain. Quantity:  1 Bottle Refills:  1  
     
   
   
   
  
 ondansetron 4 mg disintegrating tablet Commonly known as:  ZOFRAN ODT Your next dose is: Today, Tomorrow Other:  ____________ Dose:  4 mg Take 1 Tab by mouth every six (6) hours as needed for Nausea. Quantity:  30 Tab Refills:  0  
     
   
   
   
  
 oxyCODONE-acetaminophen 7.5-325 mg per tablet Commonly known as:  PERCOCET 7.5 Your next dose is: Today, Tomorrow Other:  ____________ Dose:  1 Tab Take 1 Tab by mouth every four (4) hours as needed. Refills:  0 Take these medications as directed Dose & Instructions Dispensing Information Comments Morning Noon Evening Bedtime  
 carvedilol 25 mg tablet Commonly known as:  Jordin Swain Your next dose is: Today, Tomorrow Other:  ____________ TAKE 1 TABLET BY MOUTH TWICE A DAY Quantity:  180 Tab Refills:  3  
     
   
   
   
  
 isosorbide mononitrate  mg CR tablet Commonly known as:  IMDUR Your next dose is: Today, Tomorrow Other:  ____________ TAKE 1 TABLET BY MOUTH EVERY DAY Quantity:  30 Tab Refills:  5  
     
   
   
   
  
 predniSONE 10 mg tablet Commonly known as:  Veronica Linder Your next dose is: Today, Tomorrow Other:  ____________ Take 30mg for three days then 20mg for three days then 10mg for three days Quantity:  18 Tab Refills:  0 Where to Get Your Medications These medications were sent to [facility], [address], [address] [place] [address] Hours:  24-hours Phone:  [phone]  
  bumetanide 2 mg tablet  
 hydrALAZINE 25 mg tablet  
 linaclotide 290 mcg Cap capsule  
 polyethylene glycol 17 gram packet  
 senna-docusate 8.6-50 mg per tablet  
 warfarin 2 mg tablet

## 2017-02-16 NOTE — CONSULTS
[address] [facility] Bishop Sands  YOB: 1955          Assessment & Plan:   CKD 4  · Creat stable  · Watch renal function with diuresis    CHF  · INcrease diuretic (try Bumex 2 mg BID IV)  · Watch labs    HTN  · Resume home meds  · Adjust as needed  · Uncontrolled at present       Subjective:   CC: CKD management  HPI: Patient of Dr. Danial Singh with CKD 4 which is stable. D/c one week ago. During that admission, Bumex was held due to higher creatinine but resumed at D/C. Has been on 1 mg BID at home but has 28 lb wt gain, edema, and SOB. HTN is uncontrolled. ROS: +wt gain, +edema +sob, no n/v  Current Facility-Administered Medications   Medication Dose Route Frequency    nitroglycerin (NITROSTAT) tablet 0.4 mg  0.4 mg SubLINGual PRN    albuterol-ipratropium (DUO-NEB) 2.5 MG-0.5 MG/3 ML  3 mL Nebulization NOW    bumetanide (BUMEX) tablet 1 mg  1 mg Oral ONCE     Current Outpatient Prescriptions   Medication Sig    bumetanide (BUMEX) 1 mg tablet Take 1 Tab by mouth two (2) times a day.  linaclotide (LINZESS) 290 mcg cap capsule Take 1 Cap by mouth Daily (before breakfast) for 10 days.  senna-docusate (PERICOLACE) 8.6-50 mg per tablet Take 2 Tabs by mouth daily.  predniSONE (DELTASONE) 10 mg tablet Take 30mg for three days then 20mg for three days then 10mg for three days    warfarin (COUMADIN) 4 mg tablet Take 1 Tab by mouth daily.  albuterol-ipratropium (DUONEB) 2.5 mg-0.5 mg/3 ml nebu 3 mL by Nebulization route three (3) times daily.  sucralfate (CARAFATE) 1 gram tablet Take 1 g by mouth Before breakfast, lunch, dinner and at bedtime.  fluticasone (FLONASE) 50 mcg/actuation nasal spray 2 Sprays by Both Nostrils route nightly.  calcitRIOL (ROCALTROL) 0.25 mcg capsule Take 0.25 mcg by mouth two (2) days a week.  Patient takes on Monday and Thursday    isosorbide mononitrate ER (IMDUR) 120 mg CR tablet TAKE 1 TABLET BY MOUTH EVERY DAY    carvedilol (COREG) 25 mg tablet TAKE 1 TABLET BY MOUTH TWICE A DAY    pantoprazole (PROTONIX) 40 mg tablet Take 1 Tab by mouth daily. Indications: GASTROESOPHAGEAL REFLUX    nitroglycerin (NITROSTAT) 0.3 mg SL tablet 1 Tab by SubLINGual route every five (5) minutes as needed for Chest Pain.  INSULIN LISPRO (HUMALOG SC) by SubCUTAneous route Before breakfast, lunch, and dinner. Sliding scale, stated usually using around 60units three times daily    amLODIPine (NORVASC) 10 mg tablet Take 10 mg by mouth daily.  ergocalciferol (VITAMIN D2) 50,000 unit capsule Take 50,000 Units by mouth every Tuesday and Thursday.  aspirin 81 mg tablet Take 81 mg by mouth daily.  atorvastatin (LIPITOR) 80 mg tablet Take 80 mg by mouth every evening.  ondansetron (ZOFRAN ODT) 4 mg disintegrating tablet Take 1 Tab by mouth every six (6) hours as needed for Nausea.  polyethylene glycol (MIRALAX) 17 gram packet Take 1 Packet by mouth daily. (Patient taking differently: Take 17 g by mouth daily as needed.)    mupirocin (BACTROBAN) 2 % ointment Apply  to affected area two (2) times daily as needed (lesions on feet when needed). Ointment external two times daily to lesions on feet until healed - now using as needed    albuterol (PROVENTIL HFA, VENTOLIN HFA, PROAIR HFA) 90 mcg/actuation inhaler Take 2 Puffs by inhalation every four (4) hours as needed for Wheezing.  oxyCODONE-acetaminophen (PERCOCET 7.5) 7.5-325 mg per tablet Take 1 Tab by mouth every four (4) hours as needed. Objective:     Vitals:  Blood pressure 173/70, pulse 64, temperature 98 °F (36.7 °C), resp. rate 20, height 5' 10\" (1.778 m), weight (!) 161 kg (354 lb 15.1 oz), SpO2 100 %.   Temp (24hrs), Av.1 °F (36.7 °C), Min:98 °F (36.7 °C), Max:98.2 °F (36.8 °C)      Intake and Output:          Physical Exam:               GENERAL ASSESSMENT: NAD  CHEST: diminished at bases  HEART: S1S2  ABDOMEN: Soft,NT  EXTREMITY: ++EDEMA  NEURO:Grossly non focal          ECG/rhythm:    Data Review      No results for input(s): TNIPOC in the last 72 hours. No lab exists for component: ITNL   Recent Labs      02/16/17   1342   TROIQ  0.04     Recent Labs      02/16/17   1342   NA  140   K  4.1   CL  106   CO2  23   BUN  53*   CREA  3.15*   GLU  292*   CA  8.4*   ALB  2.9*   WBC  14.0*   HGB  8.5*   HCT  26.3*   PLT  211      Recent Labs      02/16/17   1559  02/16/17   1342   INR  5.4  6.1*   PTP   --   64.7*     Needs: urine analysis, urine sodium, protein and creatinine  Lab Results   Component Value Date/Time    Sodium urine, random 25 11/10/2014 12:40 PM    Creatinine, urine 115.07 02/04/2017 09:33 AM           : Yoli Neal MD  2/16/2017        [facility]:  [email or web address]  Yaz Gann office:  [address] [place] [address],[address]  [address]  Phone: [phone]  Fax :     [phone]    Nani office:  [address] [facility]  Naty Cardoza  Phone - [phone]  Fax - [phone]

## 2017-02-16 NOTE — ROUTINE PROCESS
TRANSFER - OUT REPORT:    Verbal report given to [facility] RN(name) on Jeffrey Cluster  being transferred to 3rd floor telemetry(unit) for routine progression of care       Report consisted of patients Situation, Background, Assessment and   Recommendations(SBAR). Information from the following report(s) SBAR, ED Summary, MAR, Recent Results and Cardiac Rhythm sinus was reviewed with the receiving nurse. Lines:   Peripheral IV 02/16/17 Left;Upper; Outer Arm (Active)   Site Assessment Clean, dry, & intact 2/16/2017  1:30 PM   Phlebitis Assessment 0 2/16/2017  1:30 PM   Infiltration Assessment 0 2/16/2017  1:30 PM   Dressing Status Clean, dry, & intact 2/16/2017  1:30 PM   Dressing Type Tape;Transparent 2/16/2017  1:30 PM   Hub Color/Line Status Pink;Flushed;Patent 2/16/2017  1:30 PM        Opportunity for questions and clarification was provided.       Patient transported with:   Monitor  Tech/RN

## 2017-02-17 ENCOUNTER — APPOINTMENT (OUTPATIENT)
Dept: GENERAL RADIOLOGY | Age: 62
DRG: 291 | End: 2017-02-17
Attending: FAMILY MEDICINE
Payer: MEDICARE

## 2017-02-17 LAB
ALBUMIN SERPL BCP-MCNC: 2.7 G/DL (ref 3.5–5)
ALBUMIN/GLOB SERPL: 0.9 {RATIO} (ref 1.1–2.2)
ALP SERPL-CCNC: 51 U/L (ref 45–117)
ALT SERPL-CCNC: 20 U/L (ref 12–78)
ANION GAP BLD CALC-SCNC: 8 MMOL/L (ref 5–15)
AST SERPL W P-5'-P-CCNC: 6 U/L (ref 15–37)
BASOPHILS # BLD AUTO: 0.1 K/UL (ref 0–0.1)
BASOPHILS # BLD: 1 % (ref 0–1)
BILIRUB SERPL-MCNC: 0.3 MG/DL (ref 0.2–1)
BUN SERPL-MCNC: 54 MG/DL (ref 6–20)
BUN/CREAT SERPL: 16 (ref 12–20)
CALCIUM SERPL-MCNC: 8.3 MG/DL (ref 8.5–10.1)
CHLORIDE SERPL-SCNC: 109 MMOL/L (ref 97–108)
CK MB CFR SERPL CALC: NORMAL % (ref 0–2.5)
CK MB SERPL-MCNC: <1 NG/ML (ref 5–25)
CK SERPL-CCNC: 54 U/L (ref 26–192)
CO2 SERPL-SCNC: 26 MMOL/L (ref 21–32)
CREAT SERPL-MCNC: 3.3 MG/DL (ref 0.55–1.02)
EOSINOPHIL # BLD: 0 K/UL (ref 0–0.4)
EOSINOPHIL NFR BLD: 0 % (ref 0–7)
ERYTHROCYTE [DISTWIDTH] IN BLOOD BY AUTOMATED COUNT: 16.3 % (ref 11.5–14.5)
GLOBULIN SER CALC-MCNC: 3.1 G/DL (ref 2–4)
GLUCOSE BLD STRIP.AUTO-MCNC: 132 MG/DL (ref 65–100)
GLUCOSE BLD STRIP.AUTO-MCNC: 161 MG/DL (ref 65–100)
GLUCOSE BLD STRIP.AUTO-MCNC: 170 MG/DL (ref 65–100)
GLUCOSE BLD STRIP.AUTO-MCNC: 183 MG/DL (ref 65–100)
GLUCOSE SERPL-MCNC: 169 MG/DL (ref 65–100)
HCT VFR BLD AUTO: 23.1 % (ref 35–47)
HGB BLD-MCNC: 7.4 G/DL (ref 11.5–16)
INR PPP: 5.6 (ref 0.9–1.1)
LYMPHOCYTES # BLD AUTO: 27 % (ref 12–49)
LYMPHOCYTES # BLD: 3.4 K/UL (ref 0.8–3.5)
MAGNESIUM SERPL-MCNC: 1.3 MG/DL (ref 1.6–2.4)
MCH RBC QN AUTO: 30.3 PG (ref 26–34)
MCHC RBC AUTO-ENTMCNC: 32 G/DL (ref 30–36.5)
MCV RBC AUTO: 94.7 FL (ref 80–99)
MONOCYTES # BLD: 1.1 K/UL (ref 0–1)
MONOCYTES NFR BLD AUTO: 9 % (ref 5–13)
NEUTS SEG # BLD: 8 K/UL (ref 1.8–8)
NEUTS SEG NFR BLD AUTO: 63 % (ref 32–75)
PHOSPHATE SERPL-MCNC: 3.5 MG/DL (ref 2.6–4.7)
PLATELET # BLD AUTO: 193 K/UL (ref 150–400)
POTASSIUM SERPL-SCNC: 4.3 MMOL/L (ref 3.5–5.1)
PROT SERPL-MCNC: 5.8 G/DL (ref 6.4–8.2)
PROTHROMBIN TIME: 59.3 SEC (ref 9–11.1)
RBC # BLD AUTO: 2.44 M/UL (ref 3.8–5.2)
RBC MORPH BLD: ABNORMAL
RBC MORPH BLD: ABNORMAL
SERVICE CMNT-IMP: ABNORMAL
SODIUM SERPL-SCNC: 143 MMOL/L (ref 136–145)
TROPONIN I SERPL-MCNC: 0.04 NG/ML
TROPONIN I SERPL-MCNC: 0.05 NG/ML
WBC # BLD AUTO: 12.6 K/UL (ref 3.6–11)

## 2017-02-17 PROCEDURE — 77030013140 HC MSK NEB VYRM -A

## 2017-02-17 PROCEDURE — 85025 COMPLETE CBC W/AUTO DIFF WBC: CPT | Performed by: FAMILY MEDICINE

## 2017-02-17 PROCEDURE — 84100 ASSAY OF PHOSPHORUS: CPT | Performed by: FAMILY MEDICINE

## 2017-02-17 PROCEDURE — 83735 ASSAY OF MAGNESIUM: CPT | Performed by: FAMILY MEDICINE

## 2017-02-17 PROCEDURE — 82550 ASSAY OF CK (CPK): CPT | Performed by: FAMILY MEDICINE

## 2017-02-17 PROCEDURE — 77030013048 HC MSK CPAP W/HDGR FISP -B

## 2017-02-17 PROCEDURE — 65660000000 HC RM CCU STEPDOWN

## 2017-02-17 PROCEDURE — 74011250637 HC RX REV CODE- 250/637: Performed by: FAMILY MEDICINE

## 2017-02-17 PROCEDURE — 36415 COLL VENOUS BLD VENIPUNCTURE: CPT | Performed by: FAMILY MEDICINE

## 2017-02-17 PROCEDURE — 74011250636 HC RX REV CODE- 250/636: Performed by: FAMILY MEDICINE

## 2017-02-17 PROCEDURE — 74011000250 HC RX REV CODE- 250: Performed by: INTERNAL MEDICINE

## 2017-02-17 PROCEDURE — 74011250637 HC RX REV CODE- 250/637: Performed by: SPECIALIST

## 2017-02-17 PROCEDURE — 74022 RADEX COMPL AQT ABD SERIES: CPT

## 2017-02-17 PROCEDURE — 36430 TRANSFUSION BLD/BLD COMPNT: CPT

## 2017-02-17 PROCEDURE — 82962 GLUCOSE BLOOD TEST: CPT

## 2017-02-17 PROCEDURE — 74011250636 HC RX REV CODE- 250/636: Performed by: INTERNAL MEDICINE

## 2017-02-17 PROCEDURE — 93005 ELECTROCARDIOGRAM TRACING: CPT

## 2017-02-17 PROCEDURE — 94640 AIRWAY INHALATION TREATMENT: CPT

## 2017-02-17 PROCEDURE — 84484 ASSAY OF TROPONIN QUANT: CPT | Performed by: FAMILY MEDICINE

## 2017-02-17 PROCEDURE — 74011636637 HC RX REV CODE- 636/637: Performed by: FAMILY MEDICINE

## 2017-02-17 PROCEDURE — 86850 RBC ANTIBODY SCREEN: CPT | Performed by: FAMILY MEDICINE

## 2017-02-17 PROCEDURE — 74011000258 HC RX REV CODE- 258: Performed by: FAMILY MEDICINE

## 2017-02-17 PROCEDURE — 77010033678 HC OXYGEN DAILY

## 2017-02-17 PROCEDURE — P9016 RBC LEUKOCYTES REDUCED: HCPCS | Performed by: FAMILY MEDICINE

## 2017-02-17 PROCEDURE — 85610 PROTHROMBIN TIME: CPT | Performed by: FAMILY MEDICINE

## 2017-02-17 PROCEDURE — 86920 COMPATIBILITY TEST SPIN: CPT | Performed by: FAMILY MEDICINE

## 2017-02-17 PROCEDURE — 74011000250 HC RX REV CODE- 250: Performed by: FAMILY MEDICINE

## 2017-02-17 PROCEDURE — 93970 EXTREMITY STUDY: CPT

## 2017-02-17 PROCEDURE — 80053 COMPREHEN METABOLIC PANEL: CPT | Performed by: FAMILY MEDICINE

## 2017-02-17 RX ORDER — MAGNESIUM CITRATE
296 SOLUTION, ORAL ORAL
Status: COMPLETED | OUTPATIENT
Start: 2017-02-17 | End: 2017-02-17

## 2017-02-17 RX ORDER — ADHESIVE BANDAGE
30 BANDAGE TOPICAL
Status: DISCONTINUED | OUTPATIENT
Start: 2017-02-17 | End: 2017-02-20

## 2017-02-17 RX ORDER — MAGNESIUM SULFATE HEPTAHYDRATE 40 MG/ML
2 INJECTION, SOLUTION INTRAVENOUS ONCE
Status: COMPLETED | OUTPATIENT
Start: 2017-02-17 | End: 2017-02-19

## 2017-02-17 RX ORDER — SODIUM CHLORIDE 9 MG/ML
250 INJECTION, SOLUTION INTRAVENOUS AS NEEDED
Status: DISCONTINUED | OUTPATIENT
Start: 2017-02-17 | End: 2017-02-20 | Stop reason: HOSPADM

## 2017-02-17 RX ORDER — CARVEDILOL 12.5 MG/1
25 TABLET ORAL 2 TIMES DAILY WITH MEALS
Status: DISCONTINUED | OUTPATIENT
Start: 2017-02-17 | End: 2017-02-20 | Stop reason: HOSPADM

## 2017-02-17 RX ORDER — DEXTROMETHORPHAN POLISTIREX 30 MG/5 ML
SUSPENSION, EXTENDED RELEASE 12 HR ORAL AS NEEDED
Status: DISCONTINUED | OUTPATIENT
Start: 2017-02-17 | End: 2017-02-19

## 2017-02-17 RX ORDER — POLYETHYLENE GLYCOL 3350 17 G/17G
17 POWDER, FOR SOLUTION ORAL DAILY
Status: DISCONTINUED | OUTPATIENT
Start: 2017-02-17 | End: 2017-02-20 | Stop reason: HOSPADM

## 2017-02-17 RX ORDER — PANTOPRAZOLE SODIUM 40 MG/1
40 TABLET, DELAYED RELEASE ORAL
Status: DISCONTINUED | OUTPATIENT
Start: 2017-02-17 | End: 2017-02-20 | Stop reason: HOSPADM

## 2017-02-17 RX ADMIN — ONDANSETRON 4 MG: 2 INJECTION INTRAMUSCULAR; INTRAVENOUS at 14:31

## 2017-02-17 RX ADMIN — Medication 10 ML: at 21:27

## 2017-02-17 RX ADMIN — IPRATROPIUM BROMIDE AND ALBUTEROL SULFATE 3 ML: .5; 2.5 SOLUTION RESPIRATORY (INHALATION) at 19:55

## 2017-02-17 RX ADMIN — IPRATROPIUM BROMIDE AND ALBUTEROL SULFATE 3 ML: .5; 2.5 SOLUTION RESPIRATORY (INHALATION) at 13:00

## 2017-02-17 RX ADMIN — Medication 10 ML: at 03:33

## 2017-02-17 RX ADMIN — CARVEDILOL 25 MG: 12.5 TABLET, FILM COATED ORAL at 17:28

## 2017-02-17 RX ADMIN — PHYTONADIONE 1 MG: 10 INJECTION, EMULSION INTRAMUSCULAR; INTRAVENOUS; SUBCUTANEOUS at 09:01

## 2017-02-17 RX ADMIN — ATORVASTATIN CALCIUM 80 MG: 20 TABLET, FILM COATED ORAL at 17:30

## 2017-02-17 RX ADMIN — Medication 2 TABLET: at 08:57

## 2017-02-17 RX ADMIN — BISACODYL 5 MG: 5 TABLET, DELAYED RELEASE ORAL at 11:55

## 2017-02-17 RX ADMIN — BUMETANIDE 2 MG: 0.25 INJECTION, SOLUTION INTRAMUSCULAR; INTRAVENOUS at 06:55

## 2017-02-17 RX ADMIN — BUMETANIDE 2 MG: 0.25 INJECTION, SOLUTION INTRAMUSCULAR; INTRAVENOUS at 17:30

## 2017-02-17 RX ADMIN — ONDANSETRON 4 MG: 2 INJECTION INTRAMUSCULAR; INTRAVENOUS at 03:32

## 2017-02-17 RX ADMIN — OXYCODONE HYDROCHLORIDE AND ACETAMINOPHEN 1 TABLET: 5; 325 TABLET ORAL at 03:31

## 2017-02-17 RX ADMIN — Medication 10 ML: at 16:21

## 2017-02-17 RX ADMIN — ASPIRIN 81 MG CHEWABLE TABLET 81 MG: 81 TABLET CHEWABLE at 08:57

## 2017-02-17 RX ADMIN — IPRATROPIUM BROMIDE AND ALBUTEROL SULFATE 3 ML: .5; 2.5 SOLUTION RESPIRATORY (INHALATION) at 07:27

## 2017-02-17 RX ADMIN — AMLODIPINE BESYLATE 10 MG: 5 TABLET ORAL at 08:57

## 2017-02-17 RX ADMIN — MAGNESIUM SULFATE HEPTAHYDRATE 2 G: 40 INJECTION, SOLUTION INTRAVENOUS at 14:40

## 2017-02-17 RX ADMIN — IPRATROPIUM BROMIDE AND ALBUTEROL SULFATE 3 ML: .5; 2.5 SOLUTION RESPIRATORY (INHALATION) at 00:13

## 2017-02-17 RX ADMIN — IPRATROPIUM BROMIDE AND ALBUTEROL SULFATE 3 ML: .5; 2.5 SOLUTION RESPIRATORY (INHALATION) at 03:58

## 2017-02-17 RX ADMIN — ERYTHROPOIETIN 20000 UNITS: 20000 INJECTION, SOLUTION INTRAVENOUS; SUBCUTANEOUS at 16:20

## 2017-02-17 RX ADMIN — SUCRALFATE 1 G: 1 TABLET ORAL at 07:30

## 2017-02-17 RX ADMIN — INSULIN LISPRO 3 UNITS: 100 INJECTION, SOLUTION INTRAVENOUS; SUBCUTANEOUS at 11:54

## 2017-02-17 RX ADMIN — SUCRALFATE 1 G: 1 TABLET ORAL at 11:55

## 2017-02-17 RX ADMIN — MAGESIUM CITRATE 296 ML: 1.75 LIQUID ORAL at 21:27

## 2017-02-17 RX ADMIN — CARVEDILOL 25 MG: 12.5 TABLET, FILM COATED ORAL at 11:55

## 2017-02-17 RX ADMIN — POLYETHYLENE GLYCOL 3350 17 G: 17 POWDER, FOR SOLUTION ORAL at 08:57

## 2017-02-17 RX ADMIN — PANTOPRAZOLE SODIUM 40 MG: 40 TABLET, DELAYED RELEASE ORAL at 08:57

## 2017-02-17 RX ADMIN — IPRATROPIUM BROMIDE AND ALBUTEROL SULFATE 3 ML: .5; 2.5 SOLUTION RESPIRATORY (INHALATION) at 16:14

## 2017-02-17 RX ADMIN — SUCRALFATE 1 G: 1 TABLET ORAL at 17:27

## 2017-02-17 RX ADMIN — HYDRALAZINE HYDROCHLORIDE 25 MG: 25 TABLET, FILM COATED ORAL at 12:59

## 2017-02-17 RX ADMIN — PANTOPRAZOLE SODIUM 40 MG: 40 TABLET, DELAYED RELEASE ORAL at 17:30

## 2017-02-17 RX ADMIN — OXYCODONE HYDROCHLORIDE AND ACETAMINOPHEN 1 TABLET: 5; 325 TABLET ORAL at 14:31

## 2017-02-17 RX ADMIN — INSULIN LISPRO 3 UNITS: 100 INJECTION, SOLUTION INTRAVENOUS; SUBCUTANEOUS at 09:03

## 2017-02-17 RX ADMIN — Medication 10 ML: at 06:00

## 2017-02-17 RX ADMIN — PHYTONADIONE 2.5 MG: 10 INJECTION, EMULSION INTRAMUSCULAR; INTRAVENOUS; SUBCUTANEOUS at 11:13

## 2017-02-17 RX ADMIN — ISOSORBIDE MONONITRATE 120 MG: 60 TABLET ORAL at 08:57

## 2017-02-17 RX ADMIN — SUCRALFATE 1 G: 1 TABLET ORAL at 21:27

## 2017-02-17 NOTE — PROGRESS NOTES
Progress Note                                        Chance Polanco MD, [address] [facility] [address], [place], [id number] [place] [address] Nw                                                 Phone [phone]; Fax [phone]        2017 11:59 AM     Admit Date:           2017  Admit Diagnosis:  CHF exacerbation ([facility]  :          1955   MRN:          [id number]   ASSESSMENT/RECOMMENDATION:   1. CAD: Stable. Continue ASA, lipitor. Chest discomfort atypical for angina with unremarkable EKG and enzymes. Echo 17 unremarkable. Cardiolite 16 unremarkable. Would not pursue further ischemic w/u at this time.   -she still has chest discomfort and I reviewed with her the findings of her CT scan of chest, BNP    -tropeinism never bumped. 2. HTN: Would resume coreg.     3. ?CHF: Lungs fairly clear on exam despite findings noted on CXR and recent CT. Mechanistically, no etiology for CHF save HTN in the setting of diastolic dysfunction. BNP not remarkably high     Will see as needed tomorrow                 07 -  1900  In: 240 [P.O.:240]  Out: 250 [Urine:250]    Last 3 Recorded Weights in this Encounter    17 1314   Weight: (!) 354 lb 15.1 oz (161 kg)         02/15 1901 -  0700  In: 200 [P.O.:200]  Out: [address] [place] [facility]           Everardo Yu is a 63 y.o. female with the above problem list who was admitted for CHF exacerbation ([facility] Pt presents with LARA, orthopnea, weight gain, increasing LE swelling, and chest discomfort described as persistent and squeezing, all of these things worsening over the past weeks. Dx with RLE DVT during last admission. plavix stopped, coumadin started. Now, more anemic with supratherapeutic INR.     Everardo Yu reports still has a chronic discomfort worse with deep breathing in center of her chest..      Current Facility-Administered Medications   Medication Dose Route Frequency    pantoprazole (PROTONIX) tablet 40 mg  40 mg Oral ACB&D    0.9% sodium chloride infusion 250 mL  250 mL IntraVENous PRN    polyethylene glycol (MIRALAX) packet 17 g  17 g Oral DAILY    carvedilol (COREG) tablet 25 mg  25 mg Oral BID WITH MEALS    sodium chloride (NS) flush 5-10 mL  5-10 mL IntraVENous Q8H    sodium chloride (NS) flush 5-10 mL  5-10 mL IntraVENous PRN    bisacodyl (DULCOLAX) tablet 5 mg  5 mg Oral DAILY PRN    albuterol-ipratropium (DUO-NEB) 2.5 MG-0.5 MG/3 ML  3 mL Nebulization Q4H RT    nitroglycerin (NITROSTAT) tablet 0.4 mg  0.4 mg SubLINGual PRN    amLODIPine (NORVASC) tablet 10 mg  10 mg Oral DAILY    aspirin chewable tablet 81 mg  81 mg Oral DAILY    atorvastatin (LIPITOR) tablet 80 mg  80 mg Oral QPM    isosorbide mononitrate ER (IMDUR) tablet 120 mg  120 mg Oral DAILY    senna-docusate (PERICOLACE) 8.6-50 mg per tablet 2 Tab  2 Tab Oral DAILY    sucralfate (CARAFATE) tablet 1 g  1 g Oral AC&HS    hydrALAZINE (APRESOLINE) tablet 25 mg  25 mg Oral TID PRN    bumetanide (BUMEX) injection 2 mg  2 mg IntraVENous Q12H    insulin lispro (HUMALOG) injection   SubCUTAneous AC&HS    glucose chewable tablet 16 g  4 Tab Oral PRN    dextrose (D50W) injection syrg 12.5-25 g  12.5-25 g IntraVENous PRN    glucagon (GLUCAGEN) injection 1 mg  1 mg IntraMUSCular PRN    oxyCODONE-acetaminophen (PERCOCET) 5-325 mg per tablet 1 Tab  1 Tab Oral Q6H PRN    acetaminophen (TYLENOL) tablet 500 mg  500 mg Oral Q6H PRN    ondansetron (ZOFRAN) injection 4 mg  4 mg IntraVENous Q6H PRN      OBJECTIVE               Intake/Output Summary (Last 24 hours) at 02/17/17 1159  Last data filed at 02/17/17 1125   Gross per 24 hour   Intake              440 ml   Output             1350 ml   Net             -910 ml         Review of Systems - as per HPI      PHYSICAL EXAM        Visit Vitals    /76 (BP 1 Location: Right arm, BP Patient Position: At rest)    Pulse 72    Temp 97.8 °F (36.6 °C)    Resp 20    Ht 5' 10\" (1.778 m)    Wt (!) 354 lb 15.1 oz (161 kg)    SpO2 99%    BMI 50.93 kg/m2       Gen: Well-developed, well-nourished, in no acute distress  alert and oriented x 3, overweight. HEENT:  Pink conjunctivae, Hearing grossly normal.No scleral icterus or conjunctival, moist mucous membranes  Neck: Supple,No JVD, No Carotid Bruit, Thyroid- non tender No cervical lymphadenopathy  Resp: No accessory muscle use, Clear breath sounds, No rales or rhonchi  Card: Regular Rate,Rythm,Normal S1, S2, No murmurs, rubs or gallop. No thrills.      MSK: No cyanosis or clubbing, good capillary refill  Skin: No rashes or ulcers, no bruising  Neuro:  Cranial nerves are grossly intact, moving all four extremities, no focal deficit, follows commands appropriately  Psych:  Good insight, oriented to person, place and time, alert, Nml Affect  LE: No edema  Vascular: Distal Pulses palpable and symmetric      DATA REVIEW            Laboratory and Imaging have been reviewed by me and are notable for  Recent Labs      02/17/17   0800  02/17/17   0409  02/16/17   1342   TROIQ  0.04  0.05*  0.04     Recent Labs      02/17/17   0409  02/16/17   1342   NA  143  140   K  4.3  4.1   CO2  26  23   BUN  54*  53*   CREA  3.30*  3.15*   GLU  169*  292*   PHOS  3.5   --    MG  1.3*   --    WBC  12.6*  14.0*   HGB  7.4*  8.5*   HCT  23.1*  26.3*   PLT  193  211             Porsha Zhang MD

## 2017-02-17 NOTE — PROGRESS NOTES
Attempted to see patient for physical therapy evaluation, but patient currently with Hgb of 7.4 and awaiting 2 units PRBC's. We will hold off on evaluation until she has received her units and is able to participate with therapy assessment. Discussed with nursing, as well.     Jaziel Tan, PT

## 2017-02-17 NOTE — CONSULTS
Walt Alvarez MD, [address] [facility]  Office [phone]    Date of  Admission: 2/16/2017  1:02 PM         IMPRESSION and RECOMMENDATIONS     1.  CAD:  Stable. Continue ASA, lipitor. Chest discomfort atypical for angina with unremarkable EKG and enzymes. Echo 2/2/17 unremarkable. Cardiolite 4/11/16 unremarkable. Would not pursue further ischemic w/u at this time. 2.  HTN:  Would resume coreg. 3. ?CHF:  Lungs fairly clear on exam despite findings noted on CXR and recent CT. Mechanistically, no etiology for CHF save HTN in the setting of diastolic dysfunction. I suspect much of her 3rd spacing is due to HTN as well as renal dysfunction (probably needs more than 1mg of bumex bid at home), worsening anemia, and norvasc (though can continue for now). I have discussed this plan with the patient. She appears to understand this plan and wishes to proceed ahead.          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) ICD-10-CM: E11.29  ICD-9-CM: 250.40  8/9/2013        Vitamin D deficiency ICD-10-CM: E55.9  ICD-9-CM: 268.9  8/9/2013        Angina, class III (HCC) ICD-10-CM: I20.9  ICD-9-CM: 413.9  8/9/2013        Normocytic anemia (Chronic) ICD-10-CM: D64.9  ICD-9-CM: 285.9  5/26/2013        DJD (degenerative joint disease) of knee ICD-10-CM: M17.9  ICD-9-CM: 715.36  10/30/2012        Chronic diastolic heart failure (HCC) (Chronic) ICD-10-CM: I50.32  ICD-9-CM: 428.32  10/5/2012        HTN (hypertension) (Chronic) ICD-10-CM: I10  ICD-9-CM: 401.9  10/1/2012        Morbid obesity (Chronic) ICD-10-CM: E66.01  ICD-9-CM: 278.01  10/1/2012        Esophageal reflux ICD-10-CM: K21.9  ICD-9-CM: 530.81  10/1/2012        Gastroparesis ICD-10-CM: K31.84  ICD-9-CM: 536.3  10/1/2012        Pulmonary HTN (HCC) (Chronic) ICD-10-CM: I27.2  ICD-9-CM: 416.8  3/21/2012        Interstitial lung disease ([facility] (Chronic) ICD-10-CM: J84.9  ICD-9-CM: 475  2/28/2011        CAD (coronary Artery Disease) Native Artery (Chronic) ICD-10-CM: I25.10  ICD-9-CM: 414.01  2/16/2011    Overview Addendum 10/5/2012  7:33 AM by PRASHANT Cameron     [place] 2004  1v CAD by cath - PCI OM with SAL  Cath 5/2004 - patent stent, no new disease  Lexiscan cardiolite 12/09- normal perfusion, EF 66%  Cath: 3/19/12: PA 55/30 mean 41, wedge 26, RA 24, EDP 35, LVG 55%, LM normal, LAD normal, LCX - OM1 patent stent, OM2 prox 85% long, % with L to R collaterals                JASEN on CPAP (Chronic) ICD-10-CM: G47.33  ICD-9-CM: 327.23  2/16/2011    Overview Signed 3/21/2012  8:04 AM by PRASHANT Cameron Dr. CPAP                   History of Present Illness:     Ignacio Chisholm is a 63 y.o. female with the above problem list who was admitted for CHF exacerbation ([facility] Pt presents with LARA, orthopnea, weight gain, increasing LE swelling, and chest discomfort described as persistent and squeezing, all of these things worsening over the past weeks. Dx with RLE DVT during last admission. plavix stopped, coumadin started. Now, more anemic with supratherapeutic INR. She denies palpitations, syncope, or near-syncope.     Current Facility-Administered Medications   Medication Dose Route Frequency    pantoprazole (PROTONIX) tablet 40 mg  40 mg Oral ACB&D    0.9% sodium chloride infusion 250 mL  250 mL IntraVENous PRN    polyethylene glycol (MIRALAX) packet 17 g  17 g Oral DAILY    sodium chloride (NS) flush 5-10 mL  5-10 mL IntraVENous Q8H    sodium chloride (NS) flush 5-10 mL  5-10 mL IntraVENous PRN    bisacodyl (DULCOLAX) tablet 5 mg  5 mg Oral DAILY PRN    albuterol-ipratropium (DUO-NEB) 2.5 MG-0.5 MG/3 ML  3 mL Nebulization Q4H RT    nitroglycerin (NITROSTAT) tablet 0.4 mg  0.4 mg SubLINGual PRN    amLODIPine (NORVASC) tablet 10 mg  10 mg Oral DAILY    aspirin chewable tablet 81 mg  81 mg Oral DAILY    atorvastatin (LIPITOR) tablet 80 mg  80 mg Oral QPM    isosorbide mononitrate ER (IMDUR) tablet 120 mg  120 mg Oral DAILY    senna-docusate (PERICOLACE) 8.6-50 mg per tablet 2 Tab  2 Tab Oral DAILY    sucralfate (CARAFATE) tablet 1 g  1 g Oral AC&HS    hydrALAZINE (APRESOLINE) tablet 25 mg  25 mg Oral TID PRN    bumetanide (BUMEX) injection 2 mg  2 mg IntraVENous Q12H    insulin lispro (HUMALOG) injection   SubCUTAneous AC&HS    glucose chewable tablet 16 g  4 Tab Oral PRN    dextrose (D50W) injection syrg 12.5-25 g  12.5-25 g IntraVENous PRN    glucagon (GLUCAGEN) injection 1 mg  1 mg IntraMUSCular PRN    oxyCODONE-acetaminophen (PERCOCET) 5-325 mg per tablet 1 Tab  1 Tab Oral Q6H PRN    acetaminophen (TYLENOL) tablet 500 mg  500 mg Oral Q6H PRN    ondansetron (ZOFRAN) injection 4 mg  4 mg IntraVENous Q6H PRN      Allergies   Allergen Reactions    Contrast Dye [Iodine] Anaphylaxis    Levaquin [Levofloxacin] Nausea and Vomiting    Morphine Hives and Itching      Family History   Problem Relation Age of Onset    Heart Disease Mother     Heart Disease Brother       Social History     Social History    Marital status:      Spouse name: N/A    Number of children: N/A    Years of education: N/A     Occupational History    Not on file. Social History Main Topics    Smoking status: Former Smoker     Packs/day: 0.50     Years: 41.00     Types: Cigarettes     Quit date: 11/9/2014    Smokeless tobacco: Never Used    Alcohol use No    Drug use:  No  Sexual activity: Not on file     Other Topics Concern    Not on file     Social History Narrative       Physical Exam:     Patient Vitals for the past 16 hrs:   BP Temp Pulse Resp SpO2   02/17/17 0855 160/82 98 °F (36.7 °C) 73 20 96 %   02/17/17 0727 - - - - 98 %   02/17/17 0700 - - (!) 55 - -   02/17/17 0125 159/77 98.1 °F (36.7 °C) 63 20 99 %   02/16/17 2351 - - 63 - -   02/16/17 1930 165/90 98.6 °F (37 °C) 77 18 98 %   02/16/17 1812 161/80 98.4 °F (36.9 °C) 69 18 99 %       HEENT Exam:     Normocephalic, atraumatic. EOMI. Oropharynx negative. Neck supple. No lymphadenopathy. Lung Exam:     The patient is not dyspneic. There is no cough. Breath sounds are heard equally in all lung fields. There are no wheezes, rales, rhonchi, or rubs heard on auscultation. Heart Exam:     The rhythm is regular. The PMI is in the 5th intercostal space of the MCL. Apical impulse is normal. S1 is regular. S2 is physiologic. There is no S3, S4 gallop, murmur, click, or rub. Abdomen Exam:     Bowel sounds are normoactive. Abdomen soft in all quadrants. No tenderness. No palpable masses. No organomegaly. No hernias noted. No bruits or pulsatile mass. Extremities Exam:     The extremities are atraumatic appearing. There is no clubbing, cyanosis, ulcers, varicose veins, rash, erythemia noted in the extremities. The neurovascular status is grossly intact with normal distal sensation and pulses. Mild LE edema. Vascular Exam:     The radial, brachial, dorsalis pedis, posterior tibial, are equal and strong bilaterally The carotids are equal bilaterally without bruits.           Labs:     Lab Results   Component Value Date/Time    Glucose 169 02/17/2017 04:09 AM    Sodium 143 02/17/2017 04:09 AM    Potassium 4.3 02/17/2017 04:09 AM    Chloride 109 02/17/2017 04:09 AM    CO2 26 02/17/2017 04:09 AM    BUN 54 02/17/2017 04:09 AM    Creatinine 3.30 02/17/2017 04:09 AM    Calcium 8.3 02/17/2017 04:09 AM     Recent Labs      02/17/17   0409  02/16/17   1342   WBC  12.6*  14.0*   HGB  7.4*  8.5*   HCT  23.1*  26.3*   PLT  193  211     Recent Labs      02/17/17   0409  02/16/17   1342   SGOT  6*  8*   ALT  20  23   AP  51  62   TBILI  0.3  0.4   TP  5.8*  6.3*   ALB  2.7*  2.9*   GLOB  3.1  3.4     Recent Labs      02/17/17   0409  02/16/17   1559  02/16/17   1342   INR  5.6*  5.4  6.1*   PTP  59.3*   --   64.7*      No results for input(s): CPK, CKMB, TNIPOC in the last 72 hours. No lab exists for component: TROPONINI, ITNL  Recent Labs      02/17/17   0800   TROIQ  0.04     Lab Results   Component Value Date/Time    Cholesterol, total 176 11/09/2014 03:30 AM    HDL Cholesterol 64 11/09/2014 03:30 AM    LDL, calculated 92.6 11/09/2014 03:30 AM    Triglyceride 97 11/09/2014 03:30 AM    CHOL/HDL Ratio 2.8 11/09/2014 03:30 AM       EKG:  NSR @ 84, low volts. Echo (2/2/17):  SUMMARY:  Left ventricle: Systolic function was vigorous. Ejection fraction was  estimated to be 75 %. There were no regional wall motion abnormalities. Doppler parameters were consistent with abnormal left ventricular  relaxation (grade 1 diastolic dysfunction).

## 2017-02-17 NOTE — CDMP QUERY
Thank you for your documentation of: \" CHF exacerbation\" in your progress note. Could this be further specified as:     =>Acute on Chronic Diastolic Heart Failure requiring IV bumex  =>Other Explanation of clinical findings  =>Unable to Determine (no explanation of clinical findings)    The medical record reflects the following clinical findings, treatment, and risk factors:    Risk Factors: PMH HFpEF    Clinical Indicators: POA: + 3 pitting BLE; SOB, chest tightness, CXR: bilateral lower lobe and right upper lobe airspace opacification; pro   Treatment: IV bumex, daily weight, monitor I/O, O2, cardiology consult    Please clarify and document your clinical opinion in the progress notes and discharge summary including the definitive and/or presumptive diagnosis, (suspected or probable), related to the above clinical findings. Please include clinical findings supporting your diagnosis.     Thank you,  Bob Willett, MSN, [place] [id number]

## 2017-02-17 NOTE — PROGRESS NOTES
2- CASE MANAGEMENT NOTE:  I met with the pt and , Laisha German ([phone]), to determine potential discharge needs. They live in a one story house with 2 entry steps and their 2 adult sons live with them. She is independent with her ADL's and drives. She has a cane, rolling walker, rollator, bedside commode, shower chair, nebulizer, CPaP and oxygen from [address] [facility]. She said the only DME she is currently using is her new rollator. She does also use her nebulizer, CPaP  And oxygen. Her PCP is Dr. Mendoza Wilkerson. She has prescription drug coverage and gets her medications from [facility] on [address]. She does not anticipate any discharge needs and her  will drive her home. Care Management Interventions  PCP Verified by CM:  Yes (Dr. Mendoza Wilkerson)  Transition of Care Consult (CM Consult): Discharge Planning  Discharge Durable Medical Equipment: No (Has a cane, rolling walker, rollator, bedside commode, nebulizer, CPaP and oxygen)  Physical Therapy Consult: Yes  Occupational Therapy Consult: Yes  Speech Therapy Consult: No  Current Support Network: Lives with Spouse, Nursing Facility  Confirm Follow Up Transport: Family ()  Plan discussed with Pt/Family/Caregiver: Yes  Discharge Location  Discharge Placement:  (Home with family)    [place] Babak, [place], CM

## 2017-02-17 NOTE — DISCHARGE INSTRUCTIONS
HOME DISCHARGE INSTRUCTIONS    Nicolas Cadet / [id number] : 1955    Admission date: 2017 Discharge date: 2017     Please bring this form with you to show your care provider at your follow-up appointment. Primary care provider:  None    Discharging provider:  Kassy Davidson MD  - Family Medicine Resident   Susan Greco MD - Attending, Family Medicine     You have been admitted to the hospital with the following diagnoses:    ACUTE DIAGNOSES:  CHF exacerbation ([address]  . . . . . . . . . . . . . . . . . . . . . . . . . . . . . . . . . . . . . . . . . . . . . . . . . . . . . . . . . . . . . . . . . . . . . . . .     Medications:  -STOP: Norvasc. START: Hydralazine 25mg 1 pill every 8 hours. This is for your high Blood pressure.  -Change in medications: Bumex increased from 1mg twice daily to 2 mg twice daily. Warfarin decreased from 4mg daily to 2mg daily  -START Miralax once daily and pericolace twice daily as needed for constipation.   -Scripts for miralax, pericolace, hydralazine, warfarin, and bumex all sent directly to your pharmacy. -it is very important that you visit your PCP in 2 days for a hospital follow-up and to get an INR checked and adjust warfarin as needed. FOLLOW-UP CARE RECOMMENDATIONS:    Appointments  Follow-up Information     Follow up With Details Comments Contact Info    None   None ([phone]) Patient stated that they have no PCP      Rehan Levine DO Schedule an appointment as soon as possible for a visit in 2 days hospital follow-up, INR, CBC, CMP check, F/U of renal cyst [address]  [address]  [phone]             Follow-up tests needed: INR, CBC, CMP    Pending test results: At the time of your discharge the following test results are still pending:    Please make sure you review these results with your outpatient follow-up provider(s).     Specific symptoms to watch for: chest pain, shortness of breath, fever, chills, nausea, vomiting, diarrhea, change in mentation, falling, weakness, bleeding. What to do if new or unexpected symptoms occur? If you experience any of the above symptoms (or should other concerns or questions arise after discharge) please call your primary care physician. Return to the emergency room if you cannot get hold of your doctor. · It is very important that you keep your follow-up appointment(s). · Please bring discharge papers, medication list (and/or medication bottles) to your follow-up appointments for review by your outpatient provider(s). · Please check the list of medications and be sure it includes every medication (even non-prescription medications) that your provider wants you to take. · It is important that you take the medication exactly as they are prescribed. · Keep your medication in the bottles provided by the pharmacist and keep a list of the medication names, dosages, and times to be taken in your wallet. · Do not take other medications without consulting your doctor. · If you have any questions about your medications or other instructions, please talk to your nurse or care provider before you leave the hospital.     Information obtained by:     I understand that if any problems occur once I am at home I am to contact my physician. These instructions were explained to me and I had the opportunity to ask questions. I understand and acknowledge receipt of the instructions indicated above.                                                                                                                                                Physician's or R.N.'s Signature                                                                  Date/Time                                                                                                                                              Patient or Representative Signature Date/Time           Limiting Sodium and Fluids With Heart Failure: Care Instructions  Your Care Instructions  Sodium causes your body to keep extra water, making it harder for your heart to pump. By limiting sodium, you will feel better and lower your risk of having to go to the hospital.  People get most of their sodium from processed foods. Fast food and restaurant meals also tend to be very high in sodium. Your doctor may suggest that you limit sodium to 2,000 milligrams (mg) a day or less. That is less than 1 teaspoon of salt a day, including all the salt you eat in cooked or packaged foods. Usually, you have to limit the amount of liquids you drink only if your heart failure is severe. Limiting sodium alone often is enough to help your body get rid of extra fluids. However, your doctor may tell you to limit your fluid intake to a set amount each day. Follow-up care is a key part of your treatment and safety. Be sure to make and go to all appointments, and call your doctor if you are having problems. It's also a good idea to know your test results and keep a list of the medicines you take. How can you care for yourself at home? Read food labels  · Read food labels on cans and food packages. The labels tell you how much sodium is in each serving. Make sure that you look at the serving size. If you eat more than the serving size, you have eaten more sodium than is listed for one serving. · Food labels also tell you the Percent Daily Value. If the Percent Daily Value says 50%, it means that you will get at least 50% of all the sodium you need for the entire day in one serving. Choose products with low Percent Daily Values for sodium. · Be aware that sodium can come in forms other than salt, including monosodium glutamate (MSG), sodium citrate, and sodium bicarbonate (baking soda). MSG is often added to Asian food. You can sometimes ask for food without MSG or salt.   Buy low-sodium foods  · Buy foods that are labeled \"unsalted\" (no salt added), \"sodium-free\" (less than 5 mg of sodium per serving), or \"low-sodium\" (less than 140 mg of sodium per serving). A food labeled \"light sodium\" has less than half of the full-sodium version of that food. Foods labeled \"reduced-sodium\" may still have too much sodium. · Buy fresh vegetables or plain, frozen vegetables. Buy low-sodium versions of canned vegetables, soups, and other canned goods. Prepare low-sodium meals  · Use less salt each day when cooking. Reducing salt in this way will help you adjust to the taste. Do not add salt after cooking. Take the salt shaker off the table. · Flavor your food with garlic, lemon juice, onion, vinegar, herbs, and spices instead of salt. Do not use soy sauce, steak sauce, onion salt, garlic salt, mustard, or ketchup on your food. · Make your own salad dressings, sauces, and ketchup without adding salt. · Use less salt (or none) when recipes call for it. You can often use half the salt a recipe calls for without losing flavor. Other dishes like rice, pasta, and grains do not need added salt. · Rinse canned vegetables. This removes some--but not all--of the salt. · Avoid water that has a naturally high sodium content or that has been treated with water softeners, which add sodium. Call your local water company to find out the sodium content of your water supply. If you buy bottled water, read the label and choose a sodium-free brand. Avoid high-sodium foods, such as:  · Smoked, cured, salted, and canned meat, fish, and poultry. · Ham, medley, hot dogs, and luncheon meats. · Regular, hard, and processed cheese and regular peanut butter. · Crackers with salted tops. · Frozen prepared meals. · Canned and dried soups, broths, and bouillon, unless labeled sodium-free or low-sodium. · Canned vegetables, unless labeled sodium-free or low-sodium. · Salted snack foods such as chips and pretzels.   · [place] fries, pizza, tacos, and other fast foods.  · Pickles, olives, ketchup, and other condiments, especially soy sauce, unless labeled sodium-free or low-sodium. If you cannot cook for yourself  · Have family members or friends help you, or have someone cook low-sodium meals. · Check with your local senior nutrition program to find out where meals are served and whether they offer a low-sodium option. You can often find these programs through your local health department or hospital.  · Have meals delivered to your home. Most [facility] have a Meals on [employer]. These programs provide one hot meal a day for older adults, delivered to their homes. Ask whether these meals are low-sodium. Let them know that you are on a low-sodium diet. Limiting fluid intake  · Find a method that works for you. You might simply write down how much you drink every time you do. Some people keep a container filled with the amount of fluid allowed for that day. If they drink from a source other than the container, then they pour out that amount. · Measure your regular drinking glasses to find out how much fluid each one holds. Once you know this, you will not have to measure every time. · Besides water, milk, juices, and other drinks, some foods have a lot of fluid. Count any foods that will melt (such as ice cream or gelatin dessert) or liquid foods (such as soup) as part of your fluid intake for the day. Where can you learn more? Go to http://lindsay-kai.info/. Enter A166 in the search box to learn more about \"Limiting Sodium and Fluids With Heart Failure: Care Instructions. \"  Current as of: January 27, 2016  Content Version: 11.1  © 6692-5446 Healthwise, Incorporated. Care instructions adapted under license by [employer] (which disclaims liability or warranty for this information).  If you have questions about a medical condition or this instruction, always ask your healthcare professional. Victor Hugo Olivera any warranty or liability for your use of this information. High INR Test Result: Care Instructions  Your Care Instructions  You had a blood test to check how long it takes your blood to clot. This test is called a PT or prothrombin time test. The result of the test is called the INR level. A high INR level can happen when you take warfarin (Coumadin). Warfarin helps prevent blood clots. To do this, it slows the amount of time it takes for your blood to clot. This raises your INR level. The INR goal for people who take warfarin is usually from 2 to 3. A value higher than 3.5 increases the risk of bleeding problems. Many things can affect the way warfarin works. Some natural health products and other medicines can make warfarin work too well. That can raise the risk of bleeding. If you drink a lot of alcohol, that may raise your INR. And severe diarrhea or vomiting can also raise your INR. The best way to lower your INR will depend on several things. In some cases, the doctor may have you stop taking warfarin for a few days. You may also be given other medicines to take. You will need to be tested often to make sure your INR level is going down. You will also need to watch for signs of bleeding. The doctor has checked you carefully, but problems can develop later. If you notice any problems or new symptoms, get medical treatment right away. Follow-up care is a key part of your treatment and safety. Be sure to make and go to all appointments, and call your doctor if you are having problems. It's also a good idea to know your test results and keep a list of the medicines you take. How can you care for yourself at home? Be careful with medicines and foods  · Don't start or stop taking any medicines, vitamins, or natural remedies unless you first talk to your doctor. · Keep the amount of vitamin K in your diet about the same from day to day.  Do not suddenly eat a lot more or a lot less food that is rich in vitamin K than you usually do. Vitamin K affects how warfarin works and how your blood clots. · Avoid cranberry juice and other cranberry products. They can increase the effects of warfarin. · Limit your use of alcohol. Avoid bleeding by preventing falls  · Wear slippers or shoes with nonskid soles. · Remove throw rugs and clutter. · Rearrange furniture and electrical cords to keep them out of walking paths. · Keep stairways, porches, and outside walkways well lit. Use night-lights in hallways and bathrooms. · Be extra careful when you work with sharp tools or knives. When should you call for help? Call 911 anytime you think you may need emergency care. For example, call if:  · You have a sudden, severe headache that is different from past headaches. Call your doctor now or seek immediate medical care if:  · You have any abnormal bleeding, such as:  ¨ Nosebleeds. ¨ Vaginal bleeding that is different (heavier, more frequent, at a different time of the month) than what you are used to. ¨ Bloody or black stools, or rectal bleeding. ¨ Bloody or pink urine. Watch closely for changes in your health, and be sure to contact your doctor if you have any problems. Where can you learn more? Go to http://lindsay-kai.info/. Enter C178 in the search box to learn more about \"High INR Test Result: Care Instructions. \"  Current as of: July 28, 2016  Content Version: 11.1  © 6406-0064 [employer]. Care instructions adapted under license by [employer] (which disclaims liability or warranty for this information). If you have questions about a medical condition or this instruction, always ask your healthcare professional. [place] [address] any warranty or liability for your use of this information.       Chronic Diastolic Congestive Heart Failure (CHF):   Your Care Instructions to Avoid Triggers With Heart Failure  Triggers are anything that make your heart failure flare up. A flare-up is also called \"sudden heart failure\" or \"acute heart failure. \" When you have a flare-up, fluid builds up in your lungs, and you have problems breathing. You might need to go to the hospital. By watching for changes in your condition and avoiding triggers, you can prevent heart failure flare-ups. Follow-up care is a key part of your treatment and safety. Be sure to make and go to all appointments, and call your doctor if you are having problems. It's also a good idea to know your test results and keep a list of the medicines you take. How can you care for yourself at home? Watch for changes in your weight and condition  · Weigh yourself without clothing at the same time each day. Record your weight. Call your doctor if you gain 3 pounds or more in 24 hrs or 5 pounds in one week. A sudden weight gain may mean that your heart failure is getting worse. · Keep a daily record of your symptoms. Write down any changes in how you feel, such as new shortness of breath, cough, or problems eating. Also record if your ankles are more swollen than usual and if you have to urinate in the night more often. Note anything that you ate or did that could have triggered these changes. Limit sodium  Sodium causes your body to hold on to water, making it harder for your heart to pump. People get most of their sodium from processed foods. Fast food and restaurant meals also tend to be very high in sodium. · Your doctor may suggest that you limit sodium to 1,500 milligrams (mg) a day. That is less than 1 teaspoon of salt a day, including all the salt you eat in cooking or in packaged foods. · Read food labels on cans and food packages. They tell you how much sodium you get in one serving. Check the serving size. If you eat more than one serving, you are getting more sodium.   · Be aware that sodium can come in forms other than salt, including monosodium glutamate (MSG), sodium citrate, and sodium bicarbonate (baking soda). MSG is often added to Asian food. You can sometimes ask for food without MSG or salt. · Slowly reducing salt will help you adjust to the taste. Take the salt shaker off the table. · Flavor your food with garlic, lemon juice, onion, vinegar, herbs, and spices instead of salt. Do not use soy sauce, steak sauce, onion salt, garlic salt, mustard, or ketchup on your food, unless it is labeled \"low-sodium\" or \"low-salt. \"  · Make your own salad dressings, sauces, and ketchup without adding salt. · Use fresh or frozen ingredients, instead of canned ones, whenever you can. Choose low-sodium canned goods. · Eat less processed food and food from restaurants, including fast food. Exercise as directed  Moderate, regular exercise is very good for your heart. It improves your blood flow and helps control your weight. But too much exercise can stress your heart and cause a heart failure flare-up. · Check with your doctor before you start an exercise program.  · Walking is an easy way to get exercise. Start out slowly. Gradually increase the length and pace of your walk. Swimming, riding a bike, and using a treadmill are also good forms of exercise. · When you exercise, watch for signs that your heart is working too hard. You are pushing yourself too hard if you cannot talk while you are exercising. If you become short of breath or dizzy or have chest pain, stop, sit down, and rest.  · Do not exercise when you do not feel well. Take medicines correctly  · Take your medicines exactly as prescribed. Call your doctor if you think you are having a problem with your medicine. · Make a list of all the medicines you take. Include those prescribed to you by other doctors and any over-the-counter medicines, vitamins, or supplements you take. Take this list with you when you go to any doctor. · Take your medicines at the same time every day.  It may help you to post a list of all the medicines you take every day and what time of day you take them. · Make taking your medicine as simple as you can. Plan times to take your medicines when you are doing other things, such as eating a meal or getting ready for bed. This will make it easier to remember to take your medicines. · Get organized. Use helpful tools, such as daily or weekly pill containers. When should you call for help? Call 911 if you have symptoms of sudden heart failure such as:  · You have severe trouble breathing. · You cough up pink, foamy mucus. · You have a new irregular or rapid heartbeat. Call your doctor now or seek immediate medical care if:  · You have new or increased shortness of breath. · You are dizzy or lightheaded, or you feel like you may faint. · You have sudden weight gain, such as 3 pounds in 24 hours, or 5 pounds in one week. · You have increased swelling in your legs, ankles, or feet. · You are suddenly so tired or weak that you cannot do your usual activities. Watch closely for changes in your health, and be sure to contact your doctor if you develop new symptoms. Where can you learn more? Go to http://lindsay-kai.info/  Enter V089 in the search box to learn more about \"Avoiding Triggers With Heart Failure: Care Instructions. \"  © 8053-3150 Healthwise, Incorporated. Care instructions adapted under license by [employer] (which disclaims liability or warranty for this information). This care instruction is for use with your licensed healthcare professional. If you have questions about a medical condition or this instruction, always ask your healthcare professional. [place] [address] any warranty or liability for your use of this information. Content Version: 51.7.426902; Current as of: January 27, 2016 (modified 10/10/16).

## 2017-02-17 NOTE — PROGRESS NOTES
[address] [facility] [place] Sands  YOB: 1955          Assessment & Plan:   CKD 4  · Creat stable  · Watch renal function with diuresis    Edema  · On increased diuretic. Good output. · Watch labs  · Could consider stopping amlodipine if BP is controlled    HTN  · Coreg resumed  · Remains above goal    Anemia  · Getting transfusion  · Add Procrit       Subjective:   CC: CKD management  HPI: Renal function stable. Good UOP. HTN is uncontrolled.     ROS: +edema, no sob/n/v  Current Facility-Administered Medications   Medication Dose Route Frequency    pantoprazole (PROTONIX) tablet 40 mg  40 mg Oral ACB&D    0.9% sodium chloride infusion 250 mL  250 mL IntraVENous PRN    polyethylene glycol (MIRALAX) packet 17 g  17 g Oral DAILY    carvedilol (COREG) tablet 25 mg  25 mg Oral BID WITH MEALS    sodium chloride (NS) flush 5-10 mL  5-10 mL IntraVENous Q8H    sodium chloride (NS) flush 5-10 mL  5-10 mL IntraVENous PRN    bisacodyl (DULCOLAX) tablet 5 mg  5 mg Oral DAILY PRN    albuterol-ipratropium (DUO-NEB) 2.5 MG-0.5 MG/3 ML  3 mL Nebulization Q4H RT    nitroglycerin (NITROSTAT) tablet 0.4 mg  0.4 mg SubLINGual PRN    amLODIPine (NORVASC) tablet 10 mg  10 mg Oral DAILY    aspirin chewable tablet 81 mg  81 mg Oral DAILY    atorvastatin (LIPITOR) tablet 80 mg  80 mg Oral QPM    isosorbide mononitrate ER (IMDUR) tablet 120 mg  120 mg Oral DAILY    senna-docusate (PERICOLACE) 8.6-50 mg per tablet 2 Tab  2 Tab Oral DAILY    sucralfate (CARAFATE) tablet 1 g  1 g Oral AC&HS    hydrALAZINE (APRESOLINE) tablet 25 mg  25 mg Oral TID PRN    bumetanide (BUMEX) injection 2 mg  2 mg IntraVENous Q12H    insulin lispro (HUMALOG) injection   SubCUTAneous AC&HS    glucose chewable tablet 16 g  4 Tab Oral PRN    dextrose (D50W) injection syrg 12.5-25 g  12.5-25 g IntraVENous PRN    glucagon (GLUCAGEN) injection 1 mg  1 mg IntraMUSCular PRN    oxyCODONE-acetaminophen (PERCOCET) 5-325 mg per tablet 1 Tab  1 Tab Oral Q6H PRN    acetaminophen (TYLENOL) tablet 500 mg  500 mg Oral Q6H PRN    ondansetron (ZOFRAN) injection 4 mg  4 mg IntraVENous Q6H PRN          Objective:     Vitals:  Blood pressure 188/73, pulse 73, temperature 98 °F (36.7 °C), resp. rate 18, height 5' 10\" (1.778 m), weight (!) 161 kg (354 lb 15.1 oz), SpO2 99 %. Temp (24hrs), Av.1 °F (36.7 °C), Min:97.8 °F (36.6 °C), Max:98.6 °F (37 °C)      Intake and Output:  701 -  190  In: 410 [P.O.:360; I.V.:50]  Out: 250 [Urine:250]  02/15 190 -  0700  In: 200 [P.O.:200]  Out: 1100 [Urine:1100]    Physical Exam:               GENERAL ASSESSMENT: NAD  CHEST: diminished at bases  HEART: S1S2  ABDOMEN: Soft,NT  EXTREMITY: +EDEMA  NEURO:Grossly non focal          ECG/rhythm:    Data Review      No results for input(s): TNIPOC in the last 72 hours. No lab exists for component: ITNL   Recent Labs      17   0830  17   0800  17   0409  17   1342   CPK  54   --    --    --    CKMB  <1.0   --    --    --    TROIQ   --   0.04  0.05*  0.04     Recent Labs      17   0409  17   1342   NA  143  140   K  4.3  4.1   CL  109*  106   CO2  26  23   BUN  54*  53*   CREA  3.30*  3.15*   GLU  169*  292*   PHOS  3.5   --    MG  1.3*   --    CA  8.3*  8.4*   ALB  2.7*  2.9*   WBC  12.6*  14.0*   HGB  7.4*  8.5*   HCT  23.1*  26.3*   PLT  193  211      Recent Labs      17   0409  17   1559  17   1342   INR  5.6*  5.4  6.1*   PTP  59.3*   --   64.7*     Needs: urine analysis, urine sodium, protein and creatinine  Lab Results   Component Value Date/Time    Sodium urine, random 25 11/10/2014 12:40 PM    Creatinine, urine 115.07 2017 09:33 AM           : Judy Abbott MD  2017        [facility]:  [email or web address]  Sam [place] office:  [address] [place] [address], [address] [address]  Phone: [phone]  Fax :     [phone]    [place] office:  [address] [facility]  [place], [facility] [address] [facility] [address]  Phone - [phone]  Fax - [phone]

## 2017-02-17 NOTE — CARDIO/PULMONARY
Cardiac Rehab: 64 yo female admitted from PCP with unexpected wt gain and SOB (2/16/17). Admitted for CHF exacerbation. Per Dr Michelle Nye, CAD stable; suspect third spacing due to HTN, renal dysfunction, worsening anemia, and Norvasc. Admission INR 6.1 and Hgb 8.5>7.4. S/P blood transfusion (2/17). Cardiac history significant for chronic diastolic CHF. LVEF 75% by echo (2/4/17). Cardiologist is Dr Myah Pablo. 2/17/2017 Received consult for cardiac teaching on CHF. Met with patient, who was sitting up in bed on [facility]. Her  was also present. Pt reported they live in [place]. On disability; previously worked as a  for [employer]. Pt also reported her daughter Nury ) is a med tech and will ask many questions. Discussed pt's understanding of current condition, tx plan and cardiac hx. Pt reported recent admission with Rt leg DVT (2/2-10), plus hx MI, cardiac stents, and stress test (2016). Currently c/o constipation, reported no BM in past 2 weeks. MD & floor nurse aware. Reviewed normal EF, diastolic dysfunction, and CHF management. Pt reported she has CHF teaching packet at home and did not need another one. Discussed uncontrolled HTN with sBP in 180's. Pt stated, \"It is usually controlled. \" Pt reported she is unable to walk for exercise, due to back/knee pain and TKR. Provided handouts on HTN, nutrition, and constipation. [facility] nurse in preparing to start pRBCs. Info attached to AVS on CHF and elevated INR. Cardiac Meds:  ACE/ARB - none (creat>2)  BB - carvedilol   Statin - atorvastatin   ASA - 81 mg  Prior to admission meds also included: warfarin, amlodipine, Imdur, Bumex. Diet Education: 2/17/2017 Currently on Cardiac diet. Discussed Low Sodium diet (<1500 mg Na/day). Reviewed Hgb A1c 6.6 (2/3/17). Need standing wt. Pt stated, \"I don't do salt anymore. \" Provided booklet on Eating for a Healthier Heart.    Fluid Intake Education: 2/17/2017 Reviewed hx CKD4, with admission creat 3.15. Followed by Dr Coco Merchant. Pt stated, \"My baseline is 1.8. \" Pt reported daily fluid intake includes: 66 oz bottled flavored water, occasional diet iced tea, and occ diet ginger ale. Discussed importance of adequate fluid intake to prevent dehydration, while also avoiding fluid overload. Encouraged pt to discuss recommended daily fluid intake with MD, as it appears she may be drinking close to 2.5 L/day. Daily Weights: 2/17/2017 Pt reported she has a scale and knows she is supposed to weigh every morning. However, she has \"gotten away from it. \" Emphasized importance of monitoring daily wts to avoid stressing heart with large fluid overload; has gain 28 lbs in past week. Encouraged recording wts on a calendar, and contacting MD if gains 3 lbs in one day, or 5 lbs in one week. Provided CHF calendar. Smoking Cessation: 2/17/2017 Former smoker (quit 2014). Pt reported she started smoking at age 17 and smoked up to 2 ppd. Quit \"cold turkey\" following hospitalization in 2014. Medication Education: 2/17/2017 Patient on warfarin with admission INR 6.1. Discussed therapeutic range for INR. Pt reported she had problems regulating INR in past when on Coumadin after knee surgery. Reviewed current meds. Pt denied any difficulties obtaining or taking her meds. Uses weekly pill box. Reinforced med compliance.    New Scheduled PO Meds this admission:   Concern for Knowledge Deficit: 2/17/2017 Patient verbalized understanding of info provided.  also verbalized understanding. All questions were answered. 1.  During this hospital stay did staff take your preferences and those of your family (or caregiver) into account in deciding your individual heart failure needs, and in deciding when you left the hospital?  2.  Do you have a good understanding of the things you are responsible for in managing heart failure? 2/17/2017 YES. 3.  Do you clearly understand the purpose for taking each medication? 2/17/2017 YES.

## 2017-02-17 NOTE — ROUTINE PROCESS
Bedside and Verbal shift change report given to Jimmy Madison (oncoming nurse) by Villa Isbell RN (offgoing nurse). Report included the following information SBAR, Kardex, Intake/Output, MAR, Accordion and Recent Results. 1930- Pt states she is having pain to multiple sites to chest and legs. Notified resident regarding chest pain, received orders to give PRN pain medication at this time. 0430- INR reported from lab as 5.6, notified MD Meg Hawkins. Stated she would have residents place new orders. Bedside and Verbal shift change report given to [employer], RN (oncoming nurse) by Monse Molina RN (offgoing nurse). Report included the following information SBAR, Kardex, Intake/Output, MAR, Accordion and Recent Results.

## 2017-02-17 NOTE — PROGRESS NOTES
Occupational Therapy Note:  Orders acknowledged and chart reviewed. Patient currently with Hgb of 7.4 and awaiting 2 units PRBC's. We will hold OT evaluation until she has received her units and is able to participate with therapy assessment. Thank you.   Danielito Stout OTR/L

## 2017-02-17 NOTE — PROGRESS NOTES
[facility] FAMILY MEDICINE RESIDENCY PROGRAM   Daily Progress Note    Date: 2/17/2017    Assessment/Plan:   Kirill Blevins is a 64 yo female PMH of HFpEF, HTN, CAD s/p stent, DVT, CKD4, ILD, GERD, IDDM admitted for weight gain, SOB and chest pain concerning for CHF exacerbation. Overnight: No acute events      CHF Exacerbation: Reports sx unchanged from yesterday: chest tightness and sob persist.    UO: -900 on bumex.   -O2 as needed, CPAP qhs  -Bumex 2mg IV q12hr - consider increasing if no improvement later today  -Duo-nebs q4hr   -Daily weights, strict I&O, fluid restriction  -Consult to cardiology     ACS Rule out: Troponin trend: 0.04 to 0.05 this AM. NSR on telemetry. -EKG - F/U   -Trend troponin  -ASA      Anemia: Hg dropped from 8.5 to 7.4.   -Transfusing 2 units PRBCs and keeping 2 units ahead  -Transfuse to keep Hg >8 given cardiac hx.   -FOBT ordered-F/U    Coagulopathy: INR 6.1 --> 5.4 --> 5.6.   -Holding coumadin for now.   -1mg Vitamin K IV   -DVT ppx with SCDs  -INR daily      Hx of DVT: recent hx (2/2/17) of RLE DVT on coumadin at home which is being held for supratherapeutic INR. -BLE doppler ordered-F/U    HTN: BP elevated on admission, this morning 159/77. On imdur, coreg, norvasc and bumex at home.   -Contniue imdur, norvasc and bumex.  -Hold coreg for now  -Hydralazine 25mg TID PRN added for BP > 150/90  -Monitor BP closely      CKD Stage 4: Cr this AM 3.30 from 3.15. Baseline ~3.5.   -Caution with nephrotoxic drugs  -Consulted to nephrology   -Daily BMP while on bumex     IDDM:  Glucose 169 this AM. 2 units of lispro given. -SSI TID for now  -POC glucose checks ACHS     Hx of COPD: Could be contributing to CHF picture. -Duo-nebs   -O2 as needed     CAD s/p stent: Plavix held during last visit. Initial troponin negative. -ASA 81mg daily   -F/U trop      Constipation: Seems chronic. Given dulcolax and miralax upon previous discharge.  Pt states last bowel movement was 2 weeks ago. -Dulcolax   -KUB ordered-F/U   -Consider enemas if no BM soon     GERD: Stable. -Continue protonix 40mg daily  -Continue Carafate 1g QID      Morbid Obesity: BMI 51.2. >20lb weight gain in 6 days. -Monitor daily weights  -Encourage weight loss      FEN/GI - Cardiac diet. Fluid restrict 1500mL  Activity - Up with assistance  DVT prophylaxis - SCDs  GI prophylaxis - Protonix   Disposition - Plan to d/c to home     CODE STATUS:  DNR    Chavez Cifuentes MD  Family Medicine Resident         Subjective  No acute events overnight. Patient says she feels unchanged from yesterday-continues with chest pain, sob, and RAMESH. Pt severely constipated and is complaining of some lightheadedness and dizziness. Denies chills, headaches, palpitations, nausea and vomiting.        Inpatient Medications  Current Facility-Administered Medications   Medication Dose Route Frequency    pantoprazole (PROTONIX) tablet 40 mg  40 mg Oral ACB&D    0.9% sodium chloride infusion 250 mL  250 mL IntraVENous PRN    sodium chloride (NS) flush 5-10 mL  5-10 mL IntraVENous Q8H    sodium chloride (NS) flush 5-10 mL  5-10 mL IntraVENous PRN    bisacodyl (DULCOLAX) tablet 5 mg  5 mg Oral DAILY PRN    albuterol-ipratropium (DUO-NEB) 2.5 MG-0.5 MG/3 ML  3 mL Nebulization Q4H RT    nitroglycerin (NITROSTAT) tablet 0.4 mg  0.4 mg SubLINGual PRN    amLODIPine (NORVASC) tablet 10 mg  10 mg Oral DAILY    aspirin chewable tablet 81 mg  81 mg Oral DAILY    atorvastatin (LIPITOR) tablet 80 mg  80 mg Oral QPM    isosorbide mononitrate ER (IMDUR) tablet 120 mg  120 mg Oral DAILY    senna-docusate (PERICOLACE) 8.6-50 mg per tablet 2 Tab  2 Tab Oral DAILY    sucralfate (CARAFATE) tablet 1 g  1 g Oral AC&HS    hydrALAZINE (APRESOLINE) tablet 25 mg  25 mg Oral TID PRN    bumetanide (BUMEX) injection 2 mg  2 mg IntraVENous Q12H    insulin lispro (HUMALOG) injection   SubCUTAneous AC&HS    glucose chewable tablet 16 g  4 Tab Oral PRN    dextrose (D50W) injection syrg 12.5-25 g  12.5-25 g IntraVENous PRN    glucagon (GLUCAGEN) injection 1 mg  1 mg IntraMUSCular PRN    oxyCODONE-acetaminophen (PERCOCET) 5-325 mg per tablet 1 Tab  1 Tab Oral Q6H PRN    acetaminophen (TYLENOL) tablet 500 mg  500 mg Oral Q6H PRN    ondansetron (ZOFRAN) injection 4 mg  4 mg IntraVENous Q6H PRN         Allergies  Allergies   Allergen Reactions    Contrast Dye [Iodine] Anaphylaxis    Levaquin [Levofloxacin] Nausea and Vomiting    Morphine Hives and Itching         Objective  Vitals:  Patient Vitals for the past 8 hrs:   Temp Pulse Resp BP SpO2   02/17/17 0125 98.1 °F (36.7 °C) 63 20 159/77 99 %   02/16/17 2351 - 63 - - -         I/O:    Intake/Output Summary (Last 24 hours) at 02/17/17 0713  Last data filed at 02/17/17 0125   Gross per 24 hour   Intake              200 ml   Output             1100 ml   Net             -900 ml     Last shift:       Last 3 shifts:    02/15 1901 - 02/17 0700  In: 200 [P.O.:200]  Out: 1100 [Urine:1100]    Physical Exam:  General: No acute distress. Alert. Cooperative. On CPAP. Head: Normocephalic. Atraumatic. Respiratory: CTAB. No w/r/r/c.   Cardiovascular: RRR. Normal S1,S2. No m/r/g. Pulses 2+ throughout. GI: + bowel sounds. Nontender. No rebound tenderness or guarding. Nondistended. Obese. Extremities: 3+ pitting edema bilaterally, + LE tenderness, no cords.      Laboratory Data  Recent Results (from the past 12 hour(s))   GLUCOSE, POC    Collection Time: 02/16/17  8:58 PM   Result Value Ref Range    Glucose (POC) 247 (H) 65 - 100 mg/dL    Performed by Yasmin Paris 14, COMPREHENSIVE    Collection Time: 02/17/17  4:09 AM   Result Value Ref Range    Sodium 143 136 - 145 mmol/L    Potassium 4.3 3.5 - 5.1 mmol/L    Chloride 109 (H) 97 - 108 mmol/L    CO2 26 21 - 32 mmol/L    Anion gap 8 5 - 15 mmol/L    Glucose 169 (H) 65 - 100 mg/dL    BUN 54 (H) 6 - 20 MG/DL    Creatinine 3.30 (H) 0.55 - 1.02 MG/DL BUN/Creatinine ratio 16 12 - 20      GFR est AA 17 (L) >60 ml/min/1.73m2    GFR est non-AA 14 (L) >60 ml/min/1.73m2    Calcium 8.3 (L) 8.5 - 10.1 MG/DL    Bilirubin, total 0.3 0.2 - 1.0 MG/DL    ALT (SGPT) 20 12 - 78 U/L    AST (SGOT) 6 (L) 15 - 37 U/L    Alk. phosphatase 51 45 - 117 U/L    Protein, total 5.8 (L) 6.4 - 8.2 g/dL    Albumin 2.7 (L) 3.5 - 5.0 g/dL    Globulin 3.1 2.0 - 4.0 g/dL    A-G Ratio 0.9 (L) 1.1 - 2.2     CBC WITH AUTOMATED DIFF    Collection Time: 02/17/17  4:09 AM   Result Value Ref Range    WBC 12.6 (H) 3.6 - 11.0 K/uL    RBC 2.44 (L) 3.80 - 5.20 M/uL    HGB 7.4 (L) 11.5 - 16.0 g/dL    HCT 23.1 (L) 35.0 - 47.0 %    MCV 94.7 80.0 - 99.0 FL    MCH 30.3 26.0 - 34.0 PG    MCHC 32.0 30.0 - 36.5 g/dL    RDW 16.3 (H) 11.5 - 14.5 %    PLATELET 392 888 - 009 K/uL    NEUTROPHILS 63 32 - 75 %    LYMPHOCYTES 27 12 - 49 %    MONOCYTES 9 5 - 13 %    EOSINOPHILS 0 0 - 7 %    BASOPHILS 1 0 - 1 %    ABS. NEUTROPHILS 8.0 1.8 - 8.0 K/UL    ABS. LYMPHOCYTES 3.4 0.8 - 3.5 K/UL    ABS. MONOCYTES 1.1 (H) 0.0 - 1.0 K/UL    ABS. EOSINOPHILS 0.0 0.0 - 0.4 K/UL    ABS.  BASOPHILS 0.1 0.0 - 0.1 K/UL    RBC COMMENTS HYPOCHROMIA  1+        RBC COMMENTS ANISOCYTOSIS  1+       PROTHROMBIN TIME + INR    Collection Time: 02/17/17  4:09 AM   Result Value Ref Range    INR 5.6 (HH) 0.9 - 1.1      Prothrombin time 59.3 (H) 9.0 - 11.1 sec   TROPONIN I    Collection Time: 02/17/17  4:09 AM   Result Value Ref Range    Troponin-I, Qt. 0.05 (H) <0.05 ng/mL   PHOSPHORUS    Collection Time: 02/17/17  4:09 AM   Result Value Ref Range    Phosphorus 3.5 2.6 - 4.7 MG/DL   MAGNESIUM    Collection Time: 02/17/17  4:09 AM   Result Value Ref Range    Magnesium 1.3 (L) 1.6 - 2.4 mg/dL         Imaging    Hospital Problems:  Hospital Problems  Date Reviewed: 2/16/2017          Codes Class Noted POA    * (Principal)CHF exacerbation ([address] ICD-10-CM: I50.9  ICD-9-CM: 428.0  2/16/2017 Unknown        CKD (chronic kidney disease) stage 4, GFR 15-29 ml/min (HCC) (Chronic) ICD-10-CM: N18.4  ICD-9-CM: 585.4  2/10/2017 Yes        DM (diabetes mellitus), type 2 with renal complications (HCC) (Chronic) ICD-10-CM: E11.29  ICD-9-CM: 250.40  8/9/2013 Yes        HTN (hypertension) (Chronic) ICD-10-CM: I10  ICD-9-CM: 401.9  10/1/2012 Yes        Morbid obesity (Chronic) ICD-10-CM: E66.01  ICD-9-CM: 278.01  10/1/2012 Yes        Esophageal reflux ICD-10-CM: K21.9  ICD-9-CM: 530.81  10/1/2012 Yes

## 2017-02-17 NOTE — CDMP QUERY
2. There is noted documentation of H/O DVT in the medical record for this patient. Please clarify if this condition could be further specified as:      >=Acute DVT  POA in the setting of recent history  Or   Acute Recurrent DVT  Chronic DVT  Personal History of DVT    The medical record reflects the following risk factors, clinical indicators, and treatment    Risk Factors:      Clinical Indicators:  Progress note states: recent hx (2/2/17) of RLE DVT on coumadin at home which is being held for supratherapeutic INR     Treatment: Repeat BLE doppler order ; Coumadin PTA    Reference:  Acute DVT:  \"Acute\" defines the period of time beginning with the initial diagnosis, up to and including the entire period of time where anticoagulation is instituted (3-12 months)     Acute Recurrent DVT: \"Recurrent\" incorporates the definition of acute with the diagnosis of recurrent and ends 3-12 months beyond that time. These patients will likely require lifelong anticoagulation therapy. Chronic DVT:  \"Chronic\" treatment period extending beyond initial 12 months of treatment. Medical condition still exists and is actively being treated. Personal History of DVT: explains the patient's past medical condition that no longer exists, and is not receiving any treatment, but that has the potential for recurrence, and therefore may require monitoring    Please clarify and document your clinical opinion in the progress notes and discharge summary including the definitive and/or presumptive diagnosis, (suspected or probable), related to the above clinical findings. Please include clinical findings supporting your diagnosis.     Thank you,  Robert Morin, MSN, [address] [place] [address]

## 2017-02-17 NOTE — PROGRESS NOTES
Bilateral LE venous duplex completed. Verbal results were reported to Nurse Stella Burgess at 1130. Final results to follow.

## 2017-02-17 NOTE — MED STUDENT NOTES
*ATTENTION:  This note has been created by a medical student for educational purposes only. Please do not refer to the content of this note for clinical decision-making, billing, or other purposes. Please see attending physicians note to obtain clinical information on this patient. *     MEDICAL STUDENT PROGRESS NOTE    Shine Yinka [id number]  [id number]    1955  63 y.o.  female      Chief Complaint: SOB and chest pain    SUBJECTIVE:  Ms. Toya Card is a 64 y/o female with a pmhx of CAD, DVT (on coumadin), ILD, CKD Stage 4 (baseline since last admission ~3.5), HTN, IDDM, GERD, and COPD (baseline 2L O2) who was admitted for SOB and chest pain. No acute events overnight. The patient states that she has not changed since yesterday. She still notes dull chest pain that does not radiate. She still has abdominal pain, and has not had a bowel movement in 2 weeks. She notes that her RAMESH is unchanged. She has + dizzyness and weakness but denies any bleeding or hematochezia. She slept with CPAP overnight. Her nausea has been controlled with zofran with no episodes of emesis. OBJECTIVE:  Vital signs: Tmax 37  HR 63-83  BP trending down from 182/80 to 160/82 this AM RR18 O2sats 99% on RA  Weight: 354 (noted to be 354 on day of discharge from last admission)  Ins: 200 mL   Outs:  1100 mL urine. Physical exam:  General Appearance: Obese AA female sitting up in bed with CPAP in NAD. HEENT: NCAT. PERRL. Anicteric sclera. +Conjunctival pallor  CV: Normal rate, regular rhythm. S1 and S2 with no m/r/g. Radial and DP 2+ bilaterally. No JVD. Resp: Breathing non-labored when off CPAP. Lungs with soft expiratory wheezes improved since yesterday. No crackles noted. Abd: Soft, mildly distended. BS in all 4 quadrants. Mild tenderness to palpation over entire abdomen. Extrem: 3+ pitting edema to lower extremities R>L (previous DVT to right).   Tender to palpation over anterior surface as well as calf.     Labs: Reviewed. BMP significant for Cr of 3.3 (3.15 yesterday). Rest of electrolytes stable. CBC with WBC of 12.6 (from 14.0, on steroid taper), Hb down from 8.5 to to 7.4. Plt stable at 193. INR from 6.1 -> 5.4 -> 5.6. Troponins 0.04, 0.05, 0.04. Radiology:   KUB - moderate stool burden. LE doppler - pending.      Medications:   Current Facility-Administered Medications:     pantoprazole (PROTONIX) tablet 40 mg, 40 mg, Oral, ACB&D, Rhoda Moraer Kiesha, DO, 40 mg at 02/17/17 0857    0.9% sodium chloride infusion 250 mL, 250 mL, IntraVENous, PRN, Ashleigh Durand MD    polyethylene glycol (MIRALAX) packet 17 g, 17 g, Oral, DAILY, Kristel Burt MD, 17 g at 02/17/17 0857    carvedilol (COREG) tablet 25 mg, 25 mg, Oral, BID WITH MEALS, Magdalene Herron MD, 25 mg at 02/17/17 1155    magnesium sulfate 2 g/50 ml IVPB (premix or compounded), 2 g, IntraVENous, ONCE, Taran Neely MD    epoetin charlie (EPOGEN;PROCRIT) injection 20,000 Units, 20,000 Units, SubCUTAneous, Q7D, Taran Neely MD    sodium chloride (NS) flush 5-10 mL, 5-10 mL, IntraVENous, Q8H, Kristel Burt MD, 10 mL at 02/17/17 0600    sodium chloride (NS) flush 5-10 mL, 5-10 mL, IntraVENous, PRN, Kristel Burt MD, 10 mL at 02/17/17 0333    bisacodyl (DULCOLAX) tablet 5 mg, 5 mg, Oral, DAILY PRN, Aslheigh Durand MD, 5 mg at 02/17/17 1155    albuterol-ipratropium (DUO-NEB) 2.5 MG-0.5 MG/3 ML, 3 mL, Nebulization, Q4H RT, Kristel Burt MD, 3 mL at 02/17/17 0727    nitroglycerin (NITROSTAT) tablet 0.4 mg, 0.4 mg, SubLINGual, PRN, Ashleigh Durand MD    amLODIPine (NORVASC) tablet 10 mg, 10 mg, Oral, DAILY, Kristel Burt MD, 10 mg at 02/17/17 0857    aspirin chewable tablet 81 mg, 81 mg, Oral, DAILY, Kristel Burt MD, 81 mg at 02/17/17 0857    atorvastatin (LIPITOR) tablet 80 mg, 80 mg, Oral, QPM, Kristel Burt MD, 80 mg at 02/16/17 1900    isosorbide mononitrate ER (IMDUR) tablet 120 mg, 120 mg, Oral, DAILY, Ashleigh Durand MD, 120 mg at 02/17/17 0857    senna-docusate (PERICOLACE) 8.6-50 mg per tablet 2 Tab, 2 Tab, Oral, DAILY, Marcella Mistry MD, 2 Tab at 02/17/17 0857    sucralfate (CARAFATE) tablet 1 g, 1 g, Oral, AC&HS, Marcella Mistry MD, 1 g at 02/17/17 1155    hydrALAZINE (APRESOLINE) tablet 25 mg, 25 mg, Oral, TID PRN, Marcella Mistry MD, 25 mg at 02/17/17 1259    bumetanide (BUMEX) injection 2 mg, 2 mg, IntraVENous, Q12H, Nadya Del Valle MD, 2 mg at 02/17/17 5245    insulin lispro (HUMALOG) injection, , SubCUTAneous, AC&HS, Malik Renteria MD, 3 Units at 02/17/17 1154    glucose chewable tablet 16 g, 4 Tab, Oral, PRN, Malik Renteria MD    dextrose (D50W) injection syrg 12.5-25 g, 12.5-25 g, IntraVENous, PRN, Malik Renteria MD    glucagon (GLUCAGEN) injection 1 mg, 1 mg, IntraMUSCular, PRN, Malik Renteria MD    oxyCODONE-acetaminophen (PERCOCET) 5-325 mg per tablet 1 Tab, 1 Tab, Oral, Q6H PRN, Dann Joiner MD, 1 Tab at 02/17/17 0331    acetaminophen (TYLENOL) tablet 500 mg, 500 mg, Oral, Q6H PRN, Dann Joiner MD    ondansetron [facility]) injection 4 mg, 4 mg, IntraVENous, Q6H PRN, Dann Joiner MD, 4 mg at 02/17/17 6414      ASSESSMENT:    Ms. Didi Herron is a 62 y/o female with a pmhx of CAD, DVT (on coumadin), ILD, CKD Stage 4 (baseline since last admission ~3.5), HTN, IDDM, GERD, and COPD (baseline 2L O2) who was admitted for SOB and chest pain with negative tropinins/ECG with no ischemic change, RAMESH and reported weight gain although no jvd or crackles, and wheezing on exam.    PLAN:  1) Shortness of breath - Likely 2/2 to CHF vs COPD exacerbation. Although Hx of DVT to RLE, patient is not hypoxic or tachycardic. Patient with wheezing on exam, but not diffuse and was on steroid taper. Likely 2/2 to CHF exacerbation given volume status. - Continue Bumex 2mg IV BID as Cr stable. - Continue Duonebs for COPD component. - Strict I/Os.   - Cardiology recommends increasing bumex dose once discharged. 2) Chest Pain - negative troponins x3 and ECG without ischemic changes. Echo on 2/2/17 with no wall motion abnormalities. - No further ischemic work up per cards. 3) Anemia - Hb dropped from 8.5 to 7.4 today. Denies history of melena, hematochezia, or bleeding. No BM in 2 weeks. INR of 5.4.   - 2u PRBCs, repeat H/H 2 hours after transfusion.  - Daily CBC to trend. - FOBT pending bowel movement. - PPI increased to BID. 4) Coagulopathy: On Warfarin for DVT. INR found to be 6.1 on admission, Warfarin was held, 5.6 today. No signs of bleeding, however Hb dropped from 8.5 to 7.4.  - 1 mg IV Vitamin K  - Repeat INR tomorrow. 5) Hx of DVT: RLE DVT on last admission noted on 2/3. Discharged on Warfarin.   - Hold warfarin given INR.  - Repeat doppler of LE to look for progression. 6) Constipation: No BM in 2 weeks. Only on dulcolax, not taking miralax, reports some distension.  - KUB with moderate stool burden. - Miralax added    7) Stage 4 CKD - Cr on admission 3.15 (baseline ~3.5), today 3.35 with Bumex. - continue to trend Cr on daily BMP  - renal consulted, recommend continued diuresis. 8) Insulin Depended Type 2 DM - POCG ranging from 292 --> 169 with 2U Lispro  - POCG TID and QHS  - SSI       8) HTN - On home Amlodipine (10mg every day), Imdur (120mg every day), Bumex (1mg PO every day) . - Poorly controlled SBP 160s-180s  - Restarted Coreg per cards - 25 mg BID.   - Continue to trend. F- PO  E - Stable, trend Cr  N - Cardiac diet. Fluid restrict to 1.5L  GI - Home PPI increased to BID  DVT - supratherapeutic on Warfarin so SCDs. Code - DNR/DNI  Dispo - Cleared for home once stable.      Joanie Stewart, M4 Student

## 2017-02-17 NOTE — NURSE NAVIGATOR
Chart reviewed by Heart Failure Nurse Navigator. Heart Failure database completed. EF 75% with grade 1 diastolic dysfunction. ACEi/ARB: not indicated. BB: coreg 25 mg BID. CRT not indicated. NYHA Functional Class III symptoms. Multiple comorbidities of CAD, s/p stent, morbid obesity, ILD on oxygen 2 lpm at home, CKD stage 4, diabetes. Heart Failure Teach Back in Patient Education. Heart Failure Avoiding Triggers on Discharge Instructions. Readmission from discharge on 2/10 for acute respiratory failure and DVT.

## 2017-02-18 LAB
ALBUMIN SERPL BCP-MCNC: 2.7 G/DL (ref 3.5–5)
ALBUMIN/GLOB SERPL: 0.8 {RATIO} (ref 1.1–2.2)
ALP SERPL-CCNC: 55 U/L (ref 45–117)
ALT SERPL-CCNC: 18 U/L (ref 12–78)
ANION GAP BLD CALC-SCNC: 9 MMOL/L (ref 5–15)
AST SERPL W P-5'-P-CCNC: 5 U/L (ref 15–37)
ATRIAL RATE: 58 BPM
BASOPHILS # BLD AUTO: 0 K/UL (ref 0–0.1)
BASOPHILS # BLD: 0 % (ref 0–1)
BILIRUB SERPL-MCNC: 0.4 MG/DL (ref 0.2–1)
BUN SERPL-MCNC: 52 MG/DL (ref 6–20)
BUN/CREAT SERPL: 16 (ref 12–20)
CALCIUM SERPL-MCNC: 8.2 MG/DL (ref 8.5–10.1)
CALCULATED P AXIS, ECG09: 44 DEGREES
CALCULATED R AXIS, ECG10: 16 DEGREES
CALCULATED T AXIS, ECG11: 55 DEGREES
CHLORIDE SERPL-SCNC: 108 MMOL/L (ref 97–108)
CO2 SERPL-SCNC: 26 MMOL/L (ref 21–32)
CREAT SERPL-MCNC: 3.25 MG/DL (ref 0.55–1.02)
DIAGNOSIS, 93000: NORMAL
EOSINOPHIL # BLD: 0.3 K/UL (ref 0–0.4)
EOSINOPHIL NFR BLD: 2 % (ref 0–7)
ERYTHROCYTE [DISTWIDTH] IN BLOOD BY AUTOMATED COUNT: 16.3 % (ref 11.5–14.5)
ERYTHROCYTE [DISTWIDTH] IN BLOOD BY AUTOMATED COUNT: 16.5 % (ref 11.5–14.5)
GLOBULIN SER CALC-MCNC: 3.2 G/DL (ref 2–4)
GLUCOSE BLD STRIP.AUTO-MCNC: 142 MG/DL (ref 65–100)
GLUCOSE BLD STRIP.AUTO-MCNC: 177 MG/DL (ref 65–100)
GLUCOSE BLD STRIP.AUTO-MCNC: 183 MG/DL (ref 65–100)
GLUCOSE BLD STRIP.AUTO-MCNC: 193 MG/DL (ref 65–100)
GLUCOSE SERPL-MCNC: 163 MG/DL (ref 65–100)
HCT VFR BLD AUTO: 27.5 % (ref 35–47)
HCT VFR BLD AUTO: 28.1 % (ref 35–47)
HEMOCCULT STL QL: NEGATIVE
HGB BLD-MCNC: 9 G/DL (ref 11.5–16)
HGB BLD-MCNC: 9 G/DL (ref 11.5–16)
INR PPP: 1.8 (ref 0.9–1.1)
INR PPP: 1.9 (ref 0.9–1.1)
LYMPHOCYTES # BLD AUTO: 28 % (ref 12–49)
LYMPHOCYTES # BLD: 3.3 K/UL (ref 0.8–3.5)
MAGNESIUM SERPL-MCNC: 1.9 MG/DL (ref 1.6–2.4)
MCH RBC QN AUTO: 29.8 PG (ref 26–34)
MCH RBC QN AUTO: 30.3 PG (ref 26–34)
MCHC RBC AUTO-ENTMCNC: 32 G/DL (ref 30–36.5)
MCHC RBC AUTO-ENTMCNC: 32.7 G/DL (ref 30–36.5)
MCV RBC AUTO: 92.6 FL (ref 80–99)
MCV RBC AUTO: 93 FL (ref 80–99)
MONOCYTES # BLD: 0.7 K/UL (ref 0–1)
MONOCYTES NFR BLD AUTO: 6 % (ref 5–13)
NEUTS SEG # BLD: 7.7 K/UL (ref 1.8–8)
NEUTS SEG NFR BLD AUTO: 64 % (ref 32–75)
P-R INTERVAL, ECG05: 168 MS
PHOSPHATE SERPL-MCNC: 4 MG/DL (ref 2.6–4.7)
PLATELET # BLD AUTO: 160 K/UL (ref 150–400)
PLATELET # BLD AUTO: 164 K/UL (ref 150–400)
POTASSIUM SERPL-SCNC: 4.1 MMOL/L (ref 3.5–5.1)
PROT SERPL-MCNC: 5.9 G/DL (ref 6.4–8.2)
PROTHROMBIN TIME: 18.7 SEC (ref 9–11.1)
PROTHROMBIN TIME: 19 SEC (ref 9–11.1)
Q-T INTERVAL, ECG07: 428 MS
QRS DURATION, ECG06: 76 MS
QTC CALCULATION (BEZET), ECG08: 420 MS
RBC # BLD AUTO: 2.97 M/UL (ref 3.8–5.2)
RBC # BLD AUTO: 3.02 M/UL (ref 3.8–5.2)
SERVICE CMNT-IMP: ABNORMAL
SODIUM SERPL-SCNC: 143 MMOL/L (ref 136–145)
VENTRICULAR RATE, ECG03: 58 BPM
WBC # BLD AUTO: 11.7 K/UL (ref 3.6–11)
WBC # BLD AUTO: 12 K/UL (ref 3.6–11)

## 2017-02-18 PROCEDURE — 84100 ASSAY OF PHOSPHORUS: CPT | Performed by: FAMILY MEDICINE

## 2017-02-18 PROCEDURE — 85610 PROTHROMBIN TIME: CPT | Performed by: FAMILY MEDICINE

## 2017-02-18 PROCEDURE — 74011000250 HC RX REV CODE- 250: Performed by: FAMILY MEDICINE

## 2017-02-18 PROCEDURE — 80053 COMPREHEN METABOLIC PANEL: CPT | Performed by: FAMILY MEDICINE

## 2017-02-18 PROCEDURE — 82962 GLUCOSE BLOOD TEST: CPT

## 2017-02-18 PROCEDURE — 77010033678 HC OXYGEN DAILY

## 2017-02-18 PROCEDURE — 74011250637 HC RX REV CODE- 250/637: Performed by: FAMILY MEDICINE

## 2017-02-18 PROCEDURE — 82272 OCCULT BLD FECES 1-3 TESTS: CPT | Performed by: FAMILY MEDICINE

## 2017-02-18 PROCEDURE — 36415 COLL VENOUS BLD VENIPUNCTURE: CPT | Performed by: FAMILY MEDICINE

## 2017-02-18 PROCEDURE — 74011000250 HC RX REV CODE- 250: Performed by: INTERNAL MEDICINE

## 2017-02-18 PROCEDURE — 65660000000 HC RM CCU STEPDOWN

## 2017-02-18 PROCEDURE — 74011250637 HC RX REV CODE- 250/637: Performed by: SPECIALIST

## 2017-02-18 PROCEDURE — 85027 COMPLETE CBC AUTOMATED: CPT | Performed by: FAMILY MEDICINE

## 2017-02-18 PROCEDURE — 94640 AIRWAY INHALATION TREATMENT: CPT

## 2017-02-18 PROCEDURE — 74011636637 HC RX REV CODE- 636/637: Performed by: FAMILY MEDICINE

## 2017-02-18 PROCEDURE — 74011250636 HC RX REV CODE- 250/636: Performed by: FAMILY MEDICINE

## 2017-02-18 PROCEDURE — 94660 CPAP INITIATION&MGMT: CPT

## 2017-02-18 PROCEDURE — 83735 ASSAY OF MAGNESIUM: CPT | Performed by: FAMILY MEDICINE

## 2017-02-18 RX ORDER — IPRATROPIUM BROMIDE AND ALBUTEROL SULFATE 2.5; .5 MG/3ML; MG/3ML
3 SOLUTION RESPIRATORY (INHALATION) 3 TIMES DAILY
Status: DISCONTINUED | OUTPATIENT
Start: 2017-02-18 | End: 2017-02-20 | Stop reason: HOSPADM

## 2017-02-18 RX ORDER — WARFARIN 2 MG/1
4 TABLET ORAL DAILY
Status: DISCONTINUED | OUTPATIENT
Start: 2017-02-18 | End: 2017-02-18

## 2017-02-18 RX ORDER — WARFARIN 2 MG/1
2 TABLET ORAL ONCE
Status: COMPLETED | OUTPATIENT
Start: 2017-02-18 | End: 2017-02-18

## 2017-02-18 RX ADMIN — PANTOPRAZOLE SODIUM 40 MG: 40 TABLET, DELAYED RELEASE ORAL at 10:02

## 2017-02-18 RX ADMIN — BUMETANIDE 2 MG: 0.25 INJECTION, SOLUTION INTRAMUSCULAR; INTRAVENOUS at 05:10

## 2017-02-18 RX ADMIN — SUCRALFATE 1 G: 1 TABLET ORAL at 11:57

## 2017-02-18 RX ADMIN — Medication 10 ML: at 21:04

## 2017-02-18 RX ADMIN — INSULIN LISPRO 3 UNITS: 100 INJECTION, SOLUTION INTRAVENOUS; SUBCUTANEOUS at 11:30

## 2017-02-18 RX ADMIN — CARVEDILOL 25 MG: 12.5 TABLET, FILM COATED ORAL at 10:02

## 2017-02-18 RX ADMIN — SUCRALFATE 1 G: 1 TABLET ORAL at 17:51

## 2017-02-18 RX ADMIN — Medication 10 ML: at 05:07

## 2017-02-18 RX ADMIN — Medication 2 TABLET: at 10:02

## 2017-02-18 RX ADMIN — ASPIRIN 81 MG CHEWABLE TABLET 81 MG: 81 TABLET CHEWABLE at 10:02

## 2017-02-18 RX ADMIN — INSULIN LISPRO 3 UNITS: 100 INJECTION, SOLUTION INTRAVENOUS; SUBCUTANEOUS at 07:30

## 2017-02-18 RX ADMIN — POLYETHYLENE GLYCOL 3350 17 G: 17 POWDER, FOR SOLUTION ORAL at 10:02

## 2017-02-18 RX ADMIN — ONDANSETRON 4 MG: 2 INJECTION INTRAMUSCULAR; INTRAVENOUS at 17:48

## 2017-02-18 RX ADMIN — ISOSORBIDE MONONITRATE 120 MG: 60 TABLET ORAL at 10:03

## 2017-02-18 RX ADMIN — SUCRALFATE 1 G: 1 TABLET ORAL at 10:02

## 2017-02-18 RX ADMIN — IPRATROPIUM BROMIDE AND ALBUTEROL SULFATE 3 ML: .5; 2.5 SOLUTION RESPIRATORY (INHALATION) at 04:55

## 2017-02-18 RX ADMIN — AMLODIPINE BESYLATE 10 MG: 5 TABLET ORAL at 10:02

## 2017-02-18 RX ADMIN — Medication 5 ML: at 14:00

## 2017-02-18 RX ADMIN — ATORVASTATIN CALCIUM 80 MG: 20 TABLET, FILM COATED ORAL at 17:51

## 2017-02-18 RX ADMIN — MAGNESIUM HYDROXIDE 30 ML: 400 SUSPENSION ORAL at 21:03

## 2017-02-18 RX ADMIN — SUCRALFATE 1 G: 1 TABLET ORAL at 21:03

## 2017-02-18 RX ADMIN — IPRATROPIUM BROMIDE AND ALBUTEROL SULFATE 3 ML: .5; 2.5 SOLUTION RESPIRATORY (INHALATION) at 00:47

## 2017-02-18 RX ADMIN — CARVEDILOL 25 MG: 12.5 TABLET, FILM COATED ORAL at 17:51

## 2017-02-18 RX ADMIN — IPRATROPIUM BROMIDE AND ALBUTEROL SULFATE 3 ML: .5; 2.5 SOLUTION RESPIRATORY (INHALATION) at 08:43

## 2017-02-18 RX ADMIN — BUMETANIDE 2 MG: 0.25 INJECTION, SOLUTION INTRAMUSCULAR; INTRAVENOUS at 17:50

## 2017-02-18 RX ADMIN — IPRATROPIUM BROMIDE AND ALBUTEROL SULFATE 3 ML: .5; 2.5 SOLUTION RESPIRATORY (INHALATION) at 15:15

## 2017-02-18 RX ADMIN — WARFARIN SODIUM 2 MG: 2 TABLET ORAL at 17:51

## 2017-02-18 RX ADMIN — PANTOPRAZOLE SODIUM 40 MG: 40 TABLET, DELAYED RELEASE ORAL at 17:51

## 2017-02-18 RX ADMIN — IPRATROPIUM BROMIDE AND ALBUTEROL SULFATE 3 ML: .5; 2.5 SOLUTION RESPIRATORY (INHALATION) at 19:41

## 2017-02-18 RX ADMIN — INSULIN LISPRO 3 UNITS: 100 INJECTION, SOLUTION INTRAVENOUS; SUBCUTANEOUS at 17:51

## 2017-02-18 RX ADMIN — BISACODYL 5 MG: 5 TABLET, DELAYED RELEASE ORAL at 10:02

## 2017-02-18 NOTE — PROGRESS NOTES
[address] [facility] Sands  YOB: 1955          Assessment & Plan:   CKD 4  · Creat stable  · Watch renal function with diuresis    Edema  · On increased diuretic. Good output. · Watch labs  · Could consider stopping amlodipine if BP is controlled    HTN  · On norvasc and Coreg  · Remains above goal    Anemia  · On  Procrit       Subjective:   CC: CKD management  HPI: Renal function stable. Good UOP. HTN is uncontrolled.     ROS: +edema, no sob/n/v  Current Facility-Administered Medications   Medication Dose Route Frequency    albuterol-ipratropium (DUO-NEB) 2.5 MG-0.5 MG/3 ML  3 mL Nebulization TID    Warfarin-pharmacy to dose   Other Rx Dosing/Monitoring    warfarin (COUMADIN) tablet 2 mg  2 mg Oral ONCE    pantoprazole (PROTONIX) tablet 40 mg  40 mg Oral ACB&D    0.9% sodium chloride infusion 250 mL  250 mL IntraVENous PRN    polyethylene glycol (MIRALAX) packet 17 g  17 g Oral DAILY    carvedilol (COREG) tablet 25 mg  25 mg Oral BID WITH MEALS    epoetin charlie (EPOGEN;PROCRIT) injection 20,000 Units  20,000 Units SubCUTAneous Q7D    magnesium hydroxide (MILK OF MAGNESIA) 400 mg/5 mL oral suspension 30 mL  30 mL Oral QHS PRN    mineral oil (FLEET) enema   Rectal PRN    sodium chloride (NS) flush 5-10 mL  5-10 mL IntraVENous Q8H    sodium chloride (NS) flush 5-10 mL  5-10 mL IntraVENous PRN    bisacodyl (DULCOLAX) tablet 5 mg  5 mg Oral DAILY PRN    nitroglycerin (NITROSTAT) tablet 0.4 mg  0.4 mg SubLINGual PRN    amLODIPine (NORVASC) tablet 10 mg  10 mg Oral DAILY    aspirin chewable tablet 81 mg  81 mg Oral DAILY    atorvastatin (LIPITOR) tablet 80 mg  80 mg Oral QPM    isosorbide mononitrate ER (IMDUR) tablet 120 mg  120 mg Oral DAILY    senna-docusate (PERICOLACE) 8.6-50 mg per tablet 2 Tab  2 Tab Oral DAILY    sucralfate (CARAFATE) tablet 1 g  1 g Oral AC&HS    hydrALAZINE (APRESOLINE) tablet 25 mg  25 mg Oral TID PRN    bumetanide (BUMEX) injection 2 mg  2 mg IntraVENous Q12H    insulin lispro (HUMALOG) injection   SubCUTAneous AC&HS    glucose chewable tablet 16 g  4 Tab Oral PRN    dextrose (D50W) injection syrg 12.5-25 g  12.5-25 g IntraVENous PRN    glucagon (GLUCAGEN) injection 1 mg  1 mg IntraMUSCular PRN    oxyCODONE-acetaminophen (PERCOCET) 5-325 mg per tablet 1 Tab  1 Tab Oral Q6H PRN    acetaminophen (TYLENOL) tablet 500 mg  500 mg Oral Q6H PRN    ondansetron (ZOFRAN) injection 4 mg  4 mg IntraVENous Q6H PRN          Objective:     Vitals:  Blood pressure 161/74, pulse 70, temperature 98 °F (36.7 °C), resp. rate 20, height 5' 10\" (1.778 m), weight 158.5 kg (349 lb 6.9 oz), SpO2 98 %. Temp (24hrs), Av.9 °F (36.6 °C), Min:97.5 °F (36.4 °C), Max:98.2 °F (36.8 °C)      Intake and Output:   07 -  1900  In: 360 [P.O.:360]  Out: 1471 [QNONL:9118]   190 -  0700  In: 3981 [P.O.:1100; I.V.:50]  Out: 3600 [Urine:3600]    Physical Exam:               GENERAL ASSESSMENT: NAD  CHEST: diminished at bases  HEART: S1S2  ABDOMEN: Soft,NT  EXTREMITY: +EDEMA  NEURO:Grossly non focal          ECG/rhythm:    Data Review      No results for input(s): TNIPOC in the last 72 hours.     No lab exists for component: ITNL   Recent Labs      17   0830  17   0800  17   0409  17   1342   CPK  54   --    --    --    CKMB  <1.0   --    --    --    TROIQ   --   0.04  0.05*  0.04     Recent Labs      17   0458  17   0453  17   2350  17   0409  17   1342   NA   --   143   --   143  140   K   --   4.1   --   4.3  4.1   CL   --   108   --   109*  106   CO2   --   26   --   26  23   BUN   --   52*   --   54*  53*   CREA   --   3.25*   --   3.30*  3.15*   GLU   --   163*   --   169*  292*   PHOS   --   4.0   --   3.5   --    MG   --   1.9   --   1.3*   --    CA   --   8.2*   --   8.3*  8.4*   ALB   --   2.7*   --   2.7*  2.9*   WBC  11.7*   --   12.0*  12.6*  14.0* HGB  9.0*   --   9.0*  7.4*  8.5*   HCT  28.1*   --   27.5*  23.1*  26.3*   PLT  164   --   160  193  211      Recent Labs      02/18/17   0453  02/17/17   0409  02/16/17   1559  02/16/17   1342   INR  1.8*  5.6*  5.4  6.1*   PTP  18.7*  59.3*   --   64.7*     Needs: urine analysis, urine sodium, protein and creatinine  Lab Results   Component Value Date/Time    Sodium urine, random 25 11/10/2014 12:40 PM    Creatinine, urine 115.07 02/04/2017 09:33 AM           : Hugo Boo MD  2/18/2017        [facility]:  [email or web address]  Stacie Rinaldi office:  [address] [address]  [place], [address] [place] [address]  Phone: [phone]  Fax :     [phone]    [place] office:  [address]  Phone - [phone]  Fax - [phone]

## 2017-02-18 NOTE — PROGRESS NOTES
[facility]   Daily Progress Note    Date: 2/18/2017    Assessment/Plan:   Everardo Yu is a 62 yo female PMH of HFpEF, HTN, CAD s/p stent, DVT, CKD4, ILD, GERD, IDDM admitted for weight gain, SOB and chest pain concerning for CHF exacerbation. Overnight: No acute events      CHF Exacerbation: chest tightness, SOB unchanged. UO: -2L over the last 24 hours. BNP stable, trop peaked at 0.05; CT without PE  -O2 as needed, CPAP qhs  -Bumex 2mg IV q12hr - consider increasing if no improvement later today  - resume Coreg.   -Duo-nebs q4hr   -Daily weights, strict I&O, fluid restriction  -Consult to cardiology, appreciate recs     Anemia: stable, s/p 2u pRBC transfusion 2/17. Hb increased appropriately and stable at 9  - no FOBT yet as pt with no BMs    Coagulopathy: INR 6.1-> 1.8  - restart Warfarin; pharmacy to dose      Hx of DVT: recent hx (2/2/17) of RLE DVT. Repeat BLE doppler f/u showed DVT stable  - Warfarin restarted     HTN: BP elevated on admission, o/n 150s-160s/ 70s. On imdur, coreg, norvasc and bumex at home.   -Contniue imdur, norvasc and bumex. - Restart Coreg   -Hydralazine 25mg TID PRN added for BP > 150/90  -Monitor BP closely      CKD Stage 4: stable, Cr this AM 3.25 from 3.3. Baseline ~3.5.   -Caution with nephrotoxic drugs  -Daily BMP while on bumex   -Consulted to nephrology, appreciate recs    IDDM:  Glucose 163 this AM   -SSI for now, POC glucose checks ACHS     Hx of COPD: likely contributing to CHF picture. -Duo-nebs. Completed treatment for COPD   -O2 as needed     CAD s/p stent: Plavix held during last visit. Trops peaked    -ASA 81mg daily      Constipation: Seems chronic; last BM 2 weeks ago. KUB with moderate stool burden  - given Mg citrate o/n. milk of magnesia and fleet enema PRN ordered.      GERD: Stable. -Continue protonix 40mg daily and Carafate 1g QID      Morbid Obesity: BMI 51.2. >20lb weight gain in 6 days.    -Monitor daily weights  -Encourage weight loss     ACS Rule out: resolved. trop 0.05 -> 0.04. NSR on telemetry. -ASA      FEN/GI - Cardiac diet. Fluid restrict 1500mL  Activity - Up with assistance  DVT prophylaxis - SCDs  GI prophylaxis - Protonix   Disposition - Plan to d/c to home     CODE STATUS:  DNR    Zehra Ahumada MD  Family Medicine Resident         Subjective  No acute events overnight. Patient says she feels unchanged from yesterday-continues with chest pain, sob, and RAMESH. Pt severely constipated and is complaining of some lightheadedness and dizziness. Denies chills, headaches, palpitations, nausea and vomiting.        Inpatient Medications  Current Facility-Administered Medications   Medication Dose Route Frequency    albuterol-ipratropium (DUO-NEB) 2.5 MG-0.5 MG/3 ML  3 mL Nebulization TID    Warfarin-pharmacy to dose   Other Rx Dosing/Monitoring    warfarin (COUMADIN) tablet 2 mg  2 mg Oral ONCE    pantoprazole (PROTONIX) tablet 40 mg  40 mg Oral ACB&D    0.9% sodium chloride infusion 250 mL  250 mL IntraVENous PRN    polyethylene glycol (MIRALAX) packet 17 g  17 g Oral DAILY    carvedilol (COREG) tablet 25 mg  25 mg Oral BID WITH MEALS    epoetin charlie (EPOGEN;PROCRIT) injection 20,000 Units  20,000 Units SubCUTAneous Q7D    magnesium hydroxide (MILK OF MAGNESIA) 400 mg/5 mL oral suspension 30 mL  30 mL Oral QHS PRN    mineral oil (FLEET) enema   Rectal PRN    sodium chloride (NS) flush 5-10 mL  5-10 mL IntraVENous Q8H    sodium chloride (NS) flush 5-10 mL  5-10 mL IntraVENous PRN    bisacodyl (DULCOLAX) tablet 5 mg  5 mg Oral DAILY PRN    nitroglycerin (NITROSTAT) tablet 0.4 mg  0.4 mg SubLINGual PRN    amLODIPine (NORVASC) tablet 10 mg  10 mg Oral DAILY    aspirin chewable tablet 81 mg  81 mg Oral DAILY    atorvastatin (LIPITOR) tablet 80 mg  80 mg Oral QPM    isosorbide mononitrate ER (IMDUR) tablet 120 mg  120 mg Oral DAILY    senna-docusate (PERICOLACE) 8.6-50 mg per tablet 2 Tab  2 Tab Oral DAILY    sucralfate (CARAFATE) tablet 1 g  1 g Oral AC&HS    hydrALAZINE (APRESOLINE) tablet 25 mg  25 mg Oral TID PRN    bumetanide (BUMEX) injection 2 mg  2 mg IntraVENous Q12H    insulin lispro (HUMALOG) injection   SubCUTAneous AC&HS    glucose chewable tablet 16 g  4 Tab Oral PRN    dextrose (D50W) injection syrg 12.5-25 g  12.5-25 g IntraVENous PRN    glucagon (GLUCAGEN) injection 1 mg  1 mg IntraMUSCular PRN    oxyCODONE-acetaminophen (PERCOCET) 5-325 mg per tablet 1 Tab  1 Tab Oral Q6H PRN    acetaminophen (TYLENOL) tablet 500 mg  500 mg Oral Q6H PRN    ondansetron (ZOFRAN) injection 4 mg  4 mg IntraVENous Q6H PRN         Allergies  Allergies   Allergen Reactions    Contrast Dye [Iodine] Anaphylaxis    Levaquin [Levofloxacin] Nausea and Vomiting    Morphine Hives and Itching         Objective  Vitals:  Patient Vitals for the past 8 hrs:   Temp Pulse Resp BP SpO2   02/18/17 1249 - 89 20 - -   02/18/17 1155 98.1 °F (36.7 °C) 73 20 152/70 99 %   02/18/17 0903 98 °F (36.7 °C) 70 20 161/74 98 %   02/18/17 0653 - (!) 54 - - -         I/O:    Intake/Output Summary (Last 24 hours) at 02/18/17 1305  Last data filed at 02/18/17 1159   Gross per 24 hour   Intake             1692 ml   Output             3700 ml   Net            -2008 ml     Last shift:    02/18 0701 - 02/18 1900  In: 460 [P.O.:460]  Out: 1450 [Urine:1450]  Last 3 shifts:    02/16 1901 - 02/18 0700  In: 0338 [P.O.:1100; I.V.:50]  Out: 3600 [Urine:3600]    Physical Exam:  General: No acute distress. Alert. Cooperative. On CPAP. Head: Normocephalic. Atraumatic. Respiratory: CTAB. No w/r/r/c.   Cardiovascular: RRR. Normal S1,S2. No m/r/g. Pulses 2+ throughout. GI: + bowel sounds. Abdomen TTP at upper abdomen. No rebound tenderness or guarding. Nondistended. Obese. Extremities: 2+ pitting edema bilaterally, + LE tenderness, no cords.      Laboratory Data  Recent Results (from the past 12 hour(s))   METABOLIC PANEL, COMPREHENSIVE Collection Time: 02/18/17  4:53 AM   Result Value Ref Range    Sodium 143 136 - 145 mmol/L    Potassium 4.1 3.5 - 5.1 mmol/L    Chloride 108 97 - 108 mmol/L    CO2 26 21 - 32 mmol/L    Anion gap 9 5 - 15 mmol/L    Glucose 163 (H) 65 - 100 mg/dL    BUN 52 (H) 6 - 20 MG/DL    Creatinine 3.25 (H) 0.55 - 1.02 MG/DL    BUN/Creatinine ratio 16 12 - 20      GFR est AA 18 (L) >60 ml/min/1.73m2    GFR est non-AA 15 (L) >60 ml/min/1.73m2    Calcium 8.2 (L) 8.5 - 10.1 MG/DL    Bilirubin, total 0.4 0.2 - 1.0 MG/DL    ALT (SGPT) 18 12 - 78 U/L    AST (SGOT) 5 (L) 15 - 37 U/L    Alk.  phosphatase 55 45 - 117 U/L    Protein, total 5.9 (L) 6.4 - 8.2 g/dL    Albumin 2.7 (L) 3.5 - 5.0 g/dL    Globulin 3.2 2.0 - 4.0 g/dL    A-G Ratio 0.8 (L) 1.1 - 2.2     PROTHROMBIN TIME + INR    Collection Time: 02/18/17  4:53 AM   Result Value Ref Range    INR 1.8 (H) 0.9 - 1.1      Prothrombin time 18.7 (H) 9.0 - 11.1 sec   MAGNESIUM    Collection Time: 02/18/17  4:53 AM   Result Value Ref Range    Magnesium 1.9 1.6 - 2.4 mg/dL   PHOSPHORUS    Collection Time: 02/18/17  4:53 AM   Result Value Ref Range    Phosphorus 4.0 2.6 - 4.7 MG/DL   CBC W/O DIFF    Collection Time: 02/18/17  4:58 AM   Result Value Ref Range    WBC 11.7 (H) 3.6 - 11.0 K/uL    RBC 3.02 (L) 3.80 - 5.20 M/uL    HGB 9.0 (L) 11.5 - 16.0 g/dL    HCT 28.1 (L) 35.0 - 47.0 %    MCV 93.0 80.0 - 99.0 FL    MCH 29.8 26.0 - 34.0 PG    MCHC 32.0 30.0 - 36.5 g/dL    RDW 16.5 (H) 11.5 - 14.5 %    PLATELET 665 967 - 436 K/uL   GLUCOSE, POC    Collection Time: 02/18/17  7:37 AM   Result Value Ref Range    Glucose (POC) 177 (H) 65 - 100 mg/dL    Performed by Biju Nguyen (PCT)    OCCULT BLOOD, STOOL    Collection Time: 02/18/17 10:30 AM   Result Value Ref Range    Occult blood, stool NEGATIVE  NEG     GLUCOSE, POC    Collection Time: 02/18/17 11:29 AM   Result Value Ref Range    Glucose (POC) 193 (H) 65 - 100 mg/dL    Performed by Biju Nguyen (PCT)          [address] Problems:  Hospital Problems  Date Reviewed: 2/17/2017          Codes Class Noted POA    * (Principal)[facility] ICD-10-CM: I50.9  ICD-9-CM: 428.0  2/16/2017 Unknown        CKD (chronic kidney disease) stage 4, GFR 15-29 ml/min (HCC) (Chronic) ICD-10-CM: N18.4  ICD-9-CM: 585.4  2/10/2017 Yes        DM (diabetes mellitus), type 2 with renal complications (HCC) (Chronic) ICD-10-CM: E11.29  ICD-9-CM: 250.40  8/9/2013 Yes        HTN (hypertension) (Chronic) ICD-10-CM: I10  ICD-9-CM: 401.9  10/1/2012 Yes        Morbid obesity (Chronic) ICD-10-CM: E66.01  ICD-9-CM: 278.01  10/1/2012 Yes        Esophageal reflux ICD-10-CM: K21.9  ICD-9-CM: 530.81  10/1/2012 Yes

## 2017-02-18 NOTE — PROCEDURES
[facility]  *** FINAL REPORT ***    Name: Griffin Magaña  MRN: [id number]    Inpatient  : 14 Dec 1955  HIS Order #: [id number]  [id number] [facility] Visit #: [id number]  Date: 2017    TYPE OF TEST: Peripheral Venous Testing    REASON FOR TEST  Pain in limb, Limb swelling    Right Leg:-  Deep venous thrombosis:           Yes  Proximal extent of thrombus:      Posterior Tibial  Superficial venous thrombosis:    No  Deep venous insufficiency:        No  Superficial venous insufficiency: No    Left Leg:-  Deep venous thrombosis:           No  Superficial venous thrombosis:    No  Deep venous insufficiency:        No  Superficial venous insufficiency: No      INTERPRETATION/FINDINGS  PROCEDURE:  BILATERAL LE VENOUS DUPLEX. Evaluation of lower extremity veins with ultrasound (B-mode imaging,  pulsed Doppler, color Doppler). Includes the common femoral, deep  femoral, femoral, popliteal, posterior tibial, peroneal, and great  saphenous veins. FINDINGS: Technically difficult exam due to patient body habitus. Gray   scale imaging suboptimal. Unable to fully evaluate the bilateral calf   veins. In the right lower extremity 1 of 2 paired posterior tibial  veins were seen dialated with no compression and augmentation  suggesting occlusive thrombus. Thrombus appears to be of indeterminate   age. Gray scale and color flow duplex images of the remaining  visualized veins in both lower extremities demonstrate normal  compressibility, spontaneous and augmented flow profiles, and absence  of filling defects throughout the deep and superficial veins in both  lower extremities. CONCLUSION: Right lower extremity positive for deep vein thrombus of  indeterminate age involving 1 of 2 paired posterior veins in the mid  calf. Left lower extremity is thrombus free in the veins visualized. ADDITIONAL COMMENTS    In comparison to study dated 2017 thrombus remains in the same  location.     I have personally reviewed the data relevant to the interpretation of  this  study. TECHNOLOGIST: Jerson Dang. Evans Rojas  Signed:    PHYSICIAN: Lamont Madison.  Yang Steward MD  Signed: 02/17/2017 08:04 PM

## 2017-02-18 NOTE — PROGRESS NOTES
02/18/17 1255   Chart and Patient  Assessment   Pulmonary History 3   Surgical History 0   Chest X-ray 0   Respiratory Pattern 0   Mental Status 0   Breath Sounds 2   Cough 0   Level of Activity/Mobility 1   Respiratory Assessment Total Score 6   Date Last Assessed 02/18/17

## 2017-02-18 NOTE — PROGRESS NOTES
[facility] Dosing Services: 2/18/17    Consult for Warfarin Dosing by Pharmacy by Dr. Sari Estrada provided for this 63 y.o.  female , for indication of Venous Thrombosis. Day of Therapy -continued from home. Takes 4mg daily at home but INR was 5.4 on admission. A total of  3.5 mg of Vitamin K given on 2/17/17  Dose to achieve an INR goal of 2-3    Order entered for  Warfarin  2 (mg) ordered to be given today at 18:00. Significant drug interactions: none  Previous dose given None this admission   PT/INR Lab Results   Component Value Date/Time    INR 1.8 02/18/2017 04:53 AM    INR POC 5.4 02/16/2017 03:59 PM      Platelets Lab Results   Component Value Date/Time    PLATELET 406 52/48/0567 04:58 AM      H/H Lab Results   Component Value Date/Time    HGB 9.0 02/18/2017 04:58 AM        Pharmacy to follow daily and will provide subsequent Warfarin dosing based on clinical status.   Efra Cancer)  Contact information [phone]

## 2017-02-18 NOTE — PROGRESS NOTES
Bedside and Verbal shift change report given to Char Wild RN (oncoming nurse) by Kosta Navarrete RN (offgoing nurse). Report included the following information SBAR, Kardex, ED Summary, Intake/Output, Recent Results and Med Rec Status.

## 2017-02-18 NOTE — PROGRESS NOTES
0710 - Bedside and Verbal shift change report given to Ady Knapp (oncoming nurse) by Jozef Wyatt (offgoing nurse). Report included the following information SBAR, Kardex, Intake/Output, Accordion and Recent Results. Pt awake in bed. Respirations even and unlabored on 2L O2 via NC at rest. No acute distress noted. No BM overnight. 1900 - Bedside and Verbal shift change report given to Jozef Wyatt (oncoming nurse) by [place] (offgoing nurse). Report included the following information SBAR, Kardex, Intake/Output, Accordion, Recent Results and Cardiac Rhythm NSR.

## 2017-02-18 NOTE — PROGRESS NOTES
Bedside and Verbal shift change report given to Monico Kaiser (oncoming nurse) by Hussein Marina (offgoing nurse). Report included the following information SBAR, Kardex, Intake/Output, MAR, Accordion, Recent Results, Med Rec Status and Cardiac Rhythm NSR.     0700: Bedside and Verbal shift change report given to Mayelin Rivera (oncoming nurse) by Monico Kaiser (offgoing nurse). Report included the following information SBAR, Kardex, Intake/Output, MAR, Accordion, Recent Results, Med Rec Status and Cardiac Rhythm NSR.

## 2017-02-18 NOTE — PROGRESS NOTES
02/18/17 1249   Assessment   Breath Sounds Bilateral Diminished   Breath Sounds Left Diminished   Breath Sounds Right Diminished   Level of Activity/Mobility 0   O2 Device Nasal cannula   O2 Flow Rate (L/min) 2 l/min   SpO2 98   Position Ambulating   Resp Rate 20   Pulse (Heart Rate) 89   Chest X-Ray Clear

## 2017-02-19 ENCOUNTER — APPOINTMENT (OUTPATIENT)
Dept: CT IMAGING | Age: 62
DRG: 291 | End: 2017-02-19
Attending: FAMILY MEDICINE
Payer: MEDICARE

## 2017-02-19 LAB
ALBUMIN SERPL BCP-MCNC: 2.8 G/DL (ref 3.5–5)
ALBUMIN/GLOB SERPL: 0.9 {RATIO} (ref 1.1–2.2)
ALP SERPL-CCNC: 60 U/L (ref 45–117)
ALT SERPL-CCNC: 19 U/L (ref 12–78)
ANION GAP BLD CALC-SCNC: 6 MMOL/L (ref 5–15)
AST SERPL W P-5'-P-CCNC: 9 U/L (ref 15–37)
BASOPHILS # BLD AUTO: 0 K/UL (ref 0–0.1)
BASOPHILS # BLD: 0 % (ref 0–1)
BILIRUB SERPL-MCNC: 0.4 MG/DL (ref 0.2–1)
BUN SERPL-MCNC: 51 MG/DL (ref 6–20)
BUN/CREAT SERPL: 16 (ref 12–20)
CALCIUM SERPL-MCNC: 8.1 MG/DL (ref 8.5–10.1)
CHLORIDE SERPL-SCNC: 106 MMOL/L (ref 97–108)
CO2 SERPL-SCNC: 28 MMOL/L (ref 21–32)
CREAT SERPL-MCNC: 3.29 MG/DL (ref 0.55–1.02)
EOSINOPHIL # BLD: 0.2 K/UL (ref 0–0.4)
EOSINOPHIL NFR BLD: 3 % (ref 0–7)
ERYTHROCYTE [DISTWIDTH] IN BLOOD BY AUTOMATED COUNT: 16.2 % (ref 11.5–14.5)
GLOBULIN SER CALC-MCNC: 3.1 G/DL (ref 2–4)
GLUCOSE BLD STRIP.AUTO-MCNC: 176 MG/DL (ref 65–100)
GLUCOSE BLD STRIP.AUTO-MCNC: 194 MG/DL (ref 65–100)
GLUCOSE BLD STRIP.AUTO-MCNC: 197 MG/DL (ref 65–100)
GLUCOSE BLD STRIP.AUTO-MCNC: 211 MG/DL (ref 65–100)
GLUCOSE SERPL-MCNC: 192 MG/DL (ref 65–100)
HCT VFR BLD AUTO: 28.3 % (ref 35–47)
HGB BLD-MCNC: 9.1 G/DL (ref 11.5–16)
INR PPP: 1.9 (ref 0.9–1.1)
LYMPHOCYTES # BLD AUTO: 23 % (ref 12–49)
LYMPHOCYTES # BLD: 2.3 K/UL (ref 0.8–3.5)
MAGNESIUM SERPL-MCNC: 2 MG/DL (ref 1.6–2.4)
MCH RBC QN AUTO: 30 PG (ref 26–34)
MCHC RBC AUTO-ENTMCNC: 32.2 G/DL (ref 30–36.5)
MCV RBC AUTO: 93.4 FL (ref 80–99)
MONOCYTES # BLD: 0.4 K/UL (ref 0–1)
MONOCYTES NFR BLD AUTO: 4 % (ref 5–13)
NEUTS SEG # BLD: 6.9 K/UL (ref 1.8–8)
NEUTS SEG NFR BLD AUTO: 70 % (ref 32–75)
PHOSPHATE SERPL-MCNC: 3.3 MG/DL (ref 2.6–4.7)
PLATELET # BLD AUTO: 162 K/UL (ref 150–400)
POTASSIUM SERPL-SCNC: 4.6 MMOL/L (ref 3.5–5.1)
PROT SERPL-MCNC: 5.9 G/DL (ref 6.4–8.2)
PROTHROMBIN TIME: 19.4 SEC (ref 9–11.1)
RBC # BLD AUTO: 3.03 M/UL (ref 3.8–5.2)
SERVICE CMNT-IMP: ABNORMAL
SODIUM SERPL-SCNC: 140 MMOL/L (ref 136–145)
WBC # BLD AUTO: 9.8 K/UL (ref 3.6–11)

## 2017-02-19 PROCEDURE — 85610 PROTHROMBIN TIME: CPT | Performed by: FAMILY MEDICINE

## 2017-02-19 PROCEDURE — 82962 GLUCOSE BLOOD TEST: CPT

## 2017-02-19 PROCEDURE — 84100 ASSAY OF PHOSPHORUS: CPT | Performed by: FAMILY MEDICINE

## 2017-02-19 PROCEDURE — 74011250637 HC RX REV CODE- 250/637: Performed by: FAMILY MEDICINE

## 2017-02-19 PROCEDURE — 74011000250 HC RX REV CODE- 250: Performed by: FAMILY MEDICINE

## 2017-02-19 PROCEDURE — 65660000000 HC RM CCU STEPDOWN

## 2017-02-19 PROCEDURE — 83735 ASSAY OF MAGNESIUM: CPT | Performed by: FAMILY MEDICINE

## 2017-02-19 PROCEDURE — 74011250636 HC RX REV CODE- 250/636: Performed by: FAMILY MEDICINE

## 2017-02-19 PROCEDURE — 80053 COMPREHEN METABOLIC PANEL: CPT | Performed by: FAMILY MEDICINE

## 2017-02-19 PROCEDURE — 97530 THERAPEUTIC ACTIVITIES: CPT

## 2017-02-19 PROCEDURE — 74011000250 HC RX REV CODE- 250: Performed by: INTERNAL MEDICINE

## 2017-02-19 PROCEDURE — 36415 COLL VENOUS BLD VENIPUNCTURE: CPT | Performed by: FAMILY MEDICINE

## 2017-02-19 PROCEDURE — 94640 AIRWAY INHALATION TREATMENT: CPT

## 2017-02-19 PROCEDURE — 77010033678 HC OXYGEN DAILY

## 2017-02-19 PROCEDURE — 94660 CPAP INITIATION&MGMT: CPT

## 2017-02-19 PROCEDURE — 74011250637 HC RX REV CODE- 250/637: Performed by: SPECIALIST

## 2017-02-19 PROCEDURE — 74011636637 HC RX REV CODE- 636/637: Performed by: FAMILY MEDICINE

## 2017-02-19 PROCEDURE — 85025 COMPLETE CBC W/AUTO DIFF WBC: CPT | Performed by: FAMILY MEDICINE

## 2017-02-19 PROCEDURE — 97165 OT EVAL LOW COMPLEX 30 MIN: CPT

## 2017-02-19 PROCEDURE — 74176 CT ABD & PELVIS W/O CONTRAST: CPT

## 2017-02-19 RX ORDER — DEXTROMETHORPHAN POLISTIREX 30 MG/5 ML
SUSPENSION, EXTENDED RELEASE 12 HR ORAL
Status: DISPENSED | OUTPATIENT
Start: 2017-02-19 | End: 2017-02-19

## 2017-02-19 RX ORDER — WARFARIN 2 MG/1
2 TABLET ORAL EVERY EVENING
Status: COMPLETED | OUTPATIENT
Start: 2017-02-19 | End: 2017-02-19

## 2017-02-19 RX ADMIN — SUCRALFATE 1 G: 1 TABLET ORAL at 18:01

## 2017-02-19 RX ADMIN — BISACODYL 5 MG: 5 TABLET, DELAYED RELEASE ORAL at 20:55

## 2017-02-19 RX ADMIN — SUCRALFATE 1 G: 1 TABLET ORAL at 21:00

## 2017-02-19 RX ADMIN — BUMETANIDE 2 MG: 0.25 INJECTION, SOLUTION INTRAMUSCULAR; INTRAVENOUS at 06:04

## 2017-02-19 RX ADMIN — OXYCODONE HYDROCHLORIDE AND ACETAMINOPHEN 1 TABLET: 5; 325 TABLET ORAL at 14:40

## 2017-02-19 RX ADMIN — POLYETHYLENE GLYCOL 3350 17 G: 17 POWDER, FOR SOLUTION ORAL at 08:03

## 2017-02-19 RX ADMIN — BUMETANIDE 2 MG: 0.25 INJECTION, SOLUTION INTRAMUSCULAR; INTRAVENOUS at 18:00

## 2017-02-19 RX ADMIN — CARVEDILOL 25 MG: 12.5 TABLET, FILM COATED ORAL at 08:01

## 2017-02-19 RX ADMIN — Medication 10 ML: at 21:00

## 2017-02-19 RX ADMIN — SUCRALFATE 1 G: 1 TABLET ORAL at 08:00

## 2017-02-19 RX ADMIN — INSULIN LISPRO 3 UNITS: 100 INJECTION, SOLUTION INTRAVENOUS; SUBCUTANEOUS at 18:00

## 2017-02-19 RX ADMIN — Medication 10 ML: at 06:04

## 2017-02-19 RX ADMIN — INSULIN LISPRO 4 UNITS: 100 INJECTION, SOLUTION INTRAVENOUS; SUBCUTANEOUS at 11:51

## 2017-02-19 RX ADMIN — WARFARIN SODIUM 2 MG: 2 TABLET ORAL at 18:02

## 2017-02-19 RX ADMIN — PANTOPRAZOLE SODIUM 40 MG: 40 TABLET, DELAYED RELEASE ORAL at 18:01

## 2017-02-19 RX ADMIN — ASPIRIN 81 MG CHEWABLE TABLET 81 MG: 81 TABLET CHEWABLE at 08:01

## 2017-02-19 RX ADMIN — INSULIN LISPRO 3 UNITS: 100 INJECTION, SOLUTION INTRAVENOUS; SUBCUTANEOUS at 07:59

## 2017-02-19 RX ADMIN — Medication 2 TABLET: at 08:00

## 2017-02-19 RX ADMIN — SUCRALFATE 1 G: 1 TABLET ORAL at 11:51

## 2017-02-19 RX ADMIN — IPRATROPIUM BROMIDE AND ALBUTEROL SULFATE 3 ML: .5; 2.5 SOLUTION RESPIRATORY (INHALATION) at 14:37

## 2017-02-19 RX ADMIN — ISOSORBIDE MONONITRATE 120 MG: 60 TABLET ORAL at 08:01

## 2017-02-19 RX ADMIN — ATORVASTATIN CALCIUM 80 MG: 20 TABLET, FILM COATED ORAL at 18:01

## 2017-02-19 RX ADMIN — Medication 10 ML: at 14:43

## 2017-02-19 RX ADMIN — MAGNESIUM HYDROXIDE 30 ML: 400 SUSPENSION ORAL at 21:00

## 2017-02-19 RX ADMIN — ONDANSETRON 4 MG: 2 INJECTION INTRAMUSCULAR; INTRAVENOUS at 14:42

## 2017-02-19 RX ADMIN — CARVEDILOL 25 MG: 12.5 TABLET, FILM COATED ORAL at 18:01

## 2017-02-19 RX ADMIN — OXYCODONE HYDROCHLORIDE AND ACETAMINOPHEN 1 TABLET: 5; 325 TABLET ORAL at 07:59

## 2017-02-19 RX ADMIN — IPRATROPIUM BROMIDE AND ALBUTEROL SULFATE 3 ML: .5; 2.5 SOLUTION RESPIRATORY (INHALATION) at 20:00

## 2017-02-19 RX ADMIN — IPRATROPIUM BROMIDE AND ALBUTEROL SULFATE 3 ML: .5; 2.5 SOLUTION RESPIRATORY (INHALATION) at 07:51

## 2017-02-19 RX ADMIN — HYDRALAZINE HYDROCHLORIDE 25 MG: 25 TABLET, FILM COATED ORAL at 07:59

## 2017-02-19 RX ADMIN — ONDANSETRON 4 MG: 2 INJECTION INTRAMUSCULAR; INTRAVENOUS at 20:55

## 2017-02-19 RX ADMIN — PANTOPRAZOLE SODIUM 40 MG: 40 TABLET, DELAYED RELEASE ORAL at 07:30

## 2017-02-19 RX ADMIN — AMLODIPINE BESYLATE 10 MG: 5 TABLET ORAL at 08:00

## 2017-02-19 NOTE — PROGRESS NOTES
Bedside and Verbal shift change report given to Rodrigo Farmer (oncoming nurse) by Reagan Gupta (offgoing nurse). Report included the following information SBAR, Kardex, Intake/Output, MAR, Accordion, Recent Results, Med Rec Status and Cardiac Rhythm NSR.    0740: Bedside and Verbal shift change report given to Sherry Guido (oncoming nurse) by Rodrigo Farmer (offgoing nurse). Report included the following information SBAR, Kardex, Intake/Output, MAR, Accordion, Recent Results, Med Rec Status and Cardiac Rhythm NSR.

## 2017-02-19 NOTE — PROGRESS NOTES
Occupational Therapy EVALUATION/discharge  Patient: Mars Callahan (45 y.o. female)  Date: 2/19/2017  Primary Diagnosis: CHF exacerbation ([address]        Precautions: universal       ASSESSMENT:   Based on the objective data described below, the patient presents with good overall activity tolerance. Patient was admitted on 2/16 for SOB and chest tightness due to CHF exacerbation. Patient with R LE DVT however chronic and cleared by RN/MD for OOB activity as tolerated. Patient was independent with all ADLs and functional mobility. Patient's SpO2 remained >95% on 2L O2 with all activity; She confirms using 2L O2 at all times at home. Patient was educated on energy conservation, pacing, and pursed lip breathing techniques. Patient has no further skilled OT need and is cleared from OT services at this time. Discharge Recommendations: None  Further Equipment Recommendations for Discharge: none       SUBJECTIVE:   Patient stated I've been moving as much as I can in this room.     OBJECTIVE DATA SUMMARY:   HISTORY:   Past Medical History   Diagnosis Date     Sleep Apnea 2/16/2011    Angina, class III ([address] 8/9/2013    Aortic aneurysm ([address]     CAD (coronary Artery Disease) Native Artery 2/16/2011    CKD (chronic kidney disease) stage 4, GFR 15-29 ml/min ([address] 2/10/2017    Diabetic gastroparesis (HCC)     Diastolic heart failure ([address] 10/5/2012    Esophageal stricture 2012     dialted Dr. Nathaly Aquino G6PD deficiency ([address]      trait    GERD (gastroesophageal reflux disease)     Hypertensive Cardiovasc Dis Benign, No CHF 2/16/2011    ILD (interstitial lung disease) ([address]      open lung bx CJW 2010    OA (osteoarthritis)     Obesity 2/16/2011    Ovarian cancer ([address]      cervical and uterine    Rheumatoid arteritis ([address]     T2DM (type 2 diabetes mellitus) ([address] 8/9/2013    Tobacco use disorder 3/21/2012    Uterine cervix cancer ([address]     Vitamin D deficiency 8/9/2013     Past Surgical History Procedure Laterality Date    Hx orthopaedic  11/12/12     right knee replacement    Hx hysterectomy      Hx cholecystectomy      Hx appendectomy      Hx hernia repair      Hx carpal tunnel release       bilateral    Hx tonsil and adenoidectomy      Pr cardiac surg procedure unlist       stents    Upper gi endoscopy,demetrius mckeon  6/24/2016          Colonoscopy N/A 6/24/2016     COLONOSCOPY performed by Tamra Reynoso MD at [address]       Prior Level of Function/Home Situation: Patient lives with her family. Patient reports independence with ADLs and confirms she uses 2L O2 at all times. Home Situation  Home Environment: Private residence  One/Two Story Residence: One story  Living Alone: No  Support Systems: Spouse/Significant Other/Partner, Family member(s)  Patient Expects to be Discharged to[de-identified] Private residence  Current DME Used/Available at Home: Oxygen, portable, Shower chair  Tub or Shower Type: Shower  [x]  Right hand dominant   []  Left hand dominant    EXAMINATION OF PERFORMANCE DEFICITS:  Cognitive/Behavioral Status:  Neurologic State: Alert  Orientation Level: Oriented X4  Cognition: Appropriate decision making; Appropriate for age attention/concentration; Appropriate safety awareness  Perception: Appears intact  Perseveration: No perseveration noted  Safety/Judgement: Awareness of environment;Good awareness of safety precautions  Skin: Intact in the uppers  Edema: None noted in the uppers  Vision/Perceptual:    Tracking: Able to track stimulus in all quadrants w/o difficulty    Diplopia: No    Acuity: Within Defined Limits       Range of Motion:  WDL in the uppers     Strength:  WDL in the uppers     Coordination:  Fine Motor Skills-Upper: Left Intact; Right Intact    Gross Motor Skills-Upper: Left Intact; Right Intact     Tone & Sensation:  Tone: normal  Sensation: intact    Balance:  Sitting: Intact  Standing: Intact    Functional Mobility and Transfers for ADLs:  Bed Mobility:  Rolling: Independent  Supine to Sit: Independent  Sit to Supine: Independent  Scooting: Independent    Transfers:  Sit to Stand: Independent  Stand to Sit: Independent  Bed to Chair: Independent  Toilet Transfer : Independent    ADL Assessment:  Feeding: Independent    Oral Facial Hygiene/Grooming: Independent    Bathing: Independent    Upper Body Dressing: Independent    Lower Body Dressing: Independent    Toileting: Independent     Cognitive Retraining  Safety/Judgement: Awareness of environment;Good awareness of safety precautions    Functional Measure:  Barthel Index:    Bathin  Bladder: 10  Bowels: 10  Groomin  Dressing: 10  Feeding: 10  Mobility: 15  Stairs: 10  Toilet Use: 10  Transfer (Bed to Chair and Back): 15  Total: 100       Barthel and G-code impairment scale:  Percentage of impairment CH  0% CI  1-19% CJ  20-39% CK  40-59% CL  60-79% CM  80-99% CN  100%   Barthel Score 0-100 100 99-80 79-60 59-40 20-39 1-19   0   Barthel Score 0-20 20 17-19 13-16 9-12 5-8 1-4 0      The Barthel ADL Index: Guidelines  1. The index should be used as a record of what a patient does, not as a record of what a patient could do. 2. The main aim is to establish degree of independence from any help, physical or verbal, however minor and for whatever reason. 3. The need for supervision renders the patient not independent. 4. A patient's performance should be established using the best available evidence. Asking the patient, friends/relatives and nurses are the usual sources, but direct observation and common sense are also important. However direct testing is not needed. 5. Usually the patient's performance over the preceding 24-48 hours is important, but occasionally longer periods will be relevant. 6. Middle categories imply that the patient supplies over 50 per cent of the effort. 7. Use of aids to be independent is allowed. Mireya Jenkins., Barthel, D.W. ([id number]).  Functional evaluation: the Barthel Index. [address] [facility] (14)2. YOVANNY Tyler, Lázaro Ndiaye., [facility]., [facility], [address] (1999). Measuring the change indisability after inpatient rehabilitation; comparison of the responsiveness of the Barthel Index and Functional [place] Measure. Journal of Neurology, Neurosurgery, and Psychiatry, 66(4), 388-630. CON Espinoza, JENIFFER Smith, & Sina Rajput M.A. (2004.) Assessment of post-stroke quality of life in cost-effectiveness studies: The usefulness of the Barthel Index and the EuroQoL-5D. Quality of Life Research, 13, 993-12       G codes: In compliance with CMSs Claims Based Outcome Reporting, the following G-code set was chosen for this patient based on their primary functional limitation being treated: The outcome measure chosen to determine the severity of the functional limitation was the Barthel Index with a score of 100/100 which was correlated with the impairment scale. ? Self Care:     - CURRENT STATUS: CH - 0% impaired, limited or restricted    - GOAL STATUS: CH - 0% impaired, limited or restricted    - D/C STATUS:  CH - 0% impaired, limited or restricted     Occupational Therapy Evaluation Charge Determination   History Examination Decision-Making   LOW Complexity : Brief history review  LOW Complexity : 1-3 performance deficits relating to physical, cognitive , or psychosocial skils that result in activity limitations and / or participation restrictions  LOW Complexity : No comorbidities that affect functional and no verbal or physical assistance needed to complete eval tasks       Based on the above components, the patient evaluation is determined to be of the following complexity level: LOW   Pain:  No c/o pain during eval    Activity Tolerance:   Patient tolerated eval well.     After treatment:   []  Patient left in no apparent distress sitting up in chair  [x]  Patient left in no apparent distress in bed  [x]  Call bell left within reach  [x]  Nursing notified  [x]  Caregiver present  []  Bed alarm activated    COMMUNICATION/EDUCATION:   Communication/Collaboration:  [x]      Home safety education was provided and the patient/caregiver indicated understanding. [x]      Patient/family have participated as able and agree with findings and recommendations. []      Patient is unable to participate in plan of care at this time. Findings and recommendations were discussed with: Physical Therapist, Registered Nurse and patient.     Narendra Madden OTR/L  Time Calculation: 15 mins

## 2017-02-19 NOTE — PROGRESS NOTES
[facility] FAMILY MEDICINE RESIDENCY PROGRAM   Daily Progress Note    Date: 2/19/2017    Assessment/Plan:   Gayle Simons is a 64 yo female PMH of HFpEF, HTN, CAD s/p stent, DVT, CKD4, ILD, GERD, IDDM admitted for weight gain, SOB and chest pain concerning for CHF exacerbation. Overnight: No acute events.      CHF Exacerbation: Chest tightness, SOB unchanged. UO: -2L over the last 24 hours. Cardiology does not recommend any further ischemic w/u at this time.  -O2 as needed, CPAP qhs  -Bumex 2mg IV q12hr - consider increasing if no improvement later today  -Duo-nebs q4hr   -Daily weights, strict I&O, fluid restriction  -Consult to cardiology, appreciate recs     HTN: BP appears labile. 170/79 latest BP.   - Continue imdur, norvasc and bumex, coreg   - Hydralazine 25mg TID PRN added for BP > 150/90  - Monitor BP closely     Constipation: Seems acute on chronic; last BM 2 weeks ago. KUB with moderate stool burden. No BM with miralax and dulcolax. Abdominal tenderness and minimal guarding.   -Will try fleet enema this AM.   -CT without contrast.     Anemia: Stable at 9.0 this AM. S/p 2 units PRBCs on 2/17. FOBT negative. - Monitor with CBC    Coagulopathy: INR pending this AM. 1.8 yesterday. - Restarted on warfarin; pharmacy to dose      Hx of chronic DVT: recent hx (2/2/17) of RLE DVT. Repeat BLE doppler f/u showed DVT stable  - Continue Warfarin     CKD Stage 4: stable, Cr this AM 3.29 from 3.25. Baseline ~3.5.   -Caution with nephrotoxic drugs  -Daily BMP while on bumex   -Consulted to nephrology, appreciate recs    IDDM:  POC glucose 140-190. 9 units of lispro. -SSI for now, POC glucose checks ACHS     Hx of COPD: likely contributing to CHF picture. -Duo-nebs. Completed treatment for COPD   -O2 as needed     CAD s/p stent: Plavix held during last visit. Trops peaked    -ASA 81mg daily      GERD: Stable.    -Continue protonix 40mg daily and Carafate 1g QID      Morbid Obesity: BMI 51.2. >20lb weight gain in 6 days. -Monitor daily weights  -Encourage weight loss     ACS Rule out: resolved. trop 0.05 -> 0.04. NSR on telemetry. -ASA      FEN/GI - Cardiac diet. Activity - Up with assistance  DVT prophylaxis - SCDs  GI prophylaxis - Protonix   Disposition - Plan to d/c to home     CODE STATUS:  DNR    Slim Montes MD  Family Medicine Resident         Subjective  No acute events overnight. Patient says she feels unchanged from yesterday-continues with chest pain, sob, and RAMESH. Pt severely constipated and has some abdominal pain. Denies chills, headaches, palpitations, nausea and vomiting.        Inpatient Medications  Current Facility-Administered Medications   Medication Dose Route Frequency    albuterol-ipratropium (DUO-NEB) 2.5 MG-0.5 MG/3 ML  3 mL Nebulization TID    Warfarin-pharmacy to dose   Other Rx Dosing/Monitoring    pantoprazole (PROTONIX) tablet 40 mg  40 mg Oral ACB&D    0.9% sodium chloride infusion 250 mL  250 mL IntraVENous PRN    polyethylene glycol (MIRALAX) packet 17 g  17 g Oral DAILY    carvedilol (COREG) tablet 25 mg  25 mg Oral BID WITH MEALS    epoetin charlie (EPOGEN;PROCRIT) injection 20,000 Units  20,000 Units SubCUTAneous Q7D    magnesium hydroxide (MILK OF MAGNESIA) 400 mg/5 mL oral suspension 30 mL  30 mL Oral QHS PRN    mineral oil (FLEET) enema   Rectal PRN    sodium chloride (NS) flush 5-10 mL  5-10 mL IntraVENous Q8H    sodium chloride (NS) flush 5-10 mL  5-10 mL IntraVENous PRN    bisacodyl (DULCOLAX) tablet 5 mg  5 mg Oral DAILY PRN    nitroglycerin (NITROSTAT) tablet 0.4 mg  0.4 mg SubLINGual PRN    amLODIPine (NORVASC) tablet 10 mg  10 mg Oral DAILY    aspirin chewable tablet 81 mg  81 mg Oral DAILY    atorvastatin (LIPITOR) tablet 80 mg  80 mg Oral QPM    isosorbide mononitrate ER (IMDUR) tablet 120 mg  120 mg Oral DAILY    senna-docusate (PERICOLACE) 8.6-50 mg per tablet 2 Tab  2 Tab Oral DAILY    sucralfate (CARAFATE) tablet 1 g  1 g Oral AC&HS    hydrALAZINE (APRESOLINE) tablet 25 mg  25 mg Oral TID PRN    bumetanide (BUMEX) injection 2 mg  2 mg IntraVENous Q12H    insulin lispro (HUMALOG) injection   SubCUTAneous AC&HS    glucose chewable tablet 16 g  4 Tab Oral PRN    dextrose (D50W) injection syrg 12.5-25 g  12.5-25 g IntraVENous PRN    glucagon (GLUCAGEN) injection 1 mg  1 mg IntraMUSCular PRN    oxyCODONE-acetaminophen (PERCOCET) 5-325 mg per tablet 1 Tab  1 Tab Oral Q6H PRN    acetaminophen (TYLENOL) tablet 500 mg  500 mg Oral Q6H PRN    ondansetron (ZOFRAN) injection 4 mg  4 mg IntraVENous Q6H PRN         Allergies  Allergies   Allergen Reactions    Contrast Dye [Iodine] Anaphylaxis    Levaquin [Levofloxacin] Nausea and Vomiting    Morphine Hives and Itching         Objective  Vitals:  Patient Vitals for the past 8 hrs:   Temp Pulse Resp BP SpO2   02/19/17 0604 - (!) 59 16 170/79 99 %   02/19/17 0134 98.1 °F (36.7 °C) 73 16 122/57 98 %   02/19/17 0017 - 63 - - -         I/O:    Intake/Output Summary (Last 24 hours) at 02/19/17 0727  Last data filed at 02/19/17 0604   Gross per 24 hour   Intake              580 ml   Output             3550 ml   Net            -2970 ml     Last shift:       Last 3 shifts:    02/17 1901 - 02/19 0700  In: 1072.5 [P.O.:780]  Out: 3550 [Urine:4950]    Physical Exam:  General: No acute distress. Alert. Cooperative. On CPAP. Head: Normocephalic. Atraumatic. Respiratory: CTAB. No w/r/r/c.   Cardiovascular: RRR. Normal S1,S2. No m/r/g. Pulses 2+ throughout. GI: + bowel sounds. Abdomen TTP at upper abdomen, minimal guarding. No rebound tenderness, Nondistended. Obese. Extremities: 2+ pitting edema bilaterally, + LE tenderness, no cords.      Laboratory Data  Recent Results (from the past 12 hour(s))   GLUCOSE, POC    Collection Time: 02/18/17  8:36 PM   Result Value Ref Range    Glucose (POC) 142 (H) 65 - 100 mg/dL    Performed by Margret Phalen (PCT)    PROTHROMBIN TIME + INR    Collection Time: 02/18/17 11:31 PM   Result Value Ref Range    INR 1.9 (H) 0.9 - 1.1      Prothrombin time 19.0 (H) 9.0 - 87.4 sec   METABOLIC PANEL, COMPREHENSIVE    Collection Time: 02/19/17  6:06 AM   Result Value Ref Range    Sodium 140 136 - 145 mmol/L    Potassium 4.6 3.5 - 5.1 mmol/L    Chloride 106 97 - 108 mmol/L    CO2 28 21 - 32 mmol/L    Anion gap 6 5 - 15 mmol/L    Glucose 192 (H) 65 - 100 mg/dL    BUN 51 (H) 6 - 20 MG/DL    Creatinine 3.29 (H) 0.55 - 1.02 MG/DL    BUN/Creatinine ratio 16 12 - 20      GFR est AA 17 (L) >60 ml/min/1.73m2    GFR est non-AA 14 (L) >60 ml/min/1.73m2    Calcium 8.1 (L) 8.5 - 10.1 MG/DL    Bilirubin, total 0.4 0.2 - 1.0 MG/DL    ALT (SGPT) 19 12 - 78 U/L    AST (SGOT) 9 (L) 15 - 37 U/L    Alk. phosphatase 60 45 - 117 U/L    Protein, total 5.9 (L) 6.4 - 8.2 g/dL    Albumin 2.8 (L) 3.5 - 5.0 g/dL    Globulin 3.1 2.0 - 4.0 g/dL    A-G Ratio 0.9 (L) 1.1 - 2.2     CBC WITH AUTOMATED DIFF    Collection Time: 02/19/17  6:06 AM   Result Value Ref Range    WBC 9.8 3.6 - 11.0 K/uL    RBC 3.03 (L) 3.80 - 5.20 M/uL    HGB 9.1 (L) 11.5 - 16.0 g/dL    HCT 28.3 (L) 35.0 - 47.0 %    MCV 93.4 80.0 - 99.0 FL    MCH 30.0 26.0 - 34.0 PG    MCHC 32.2 30.0 - 36.5 g/dL    RDW 16.2 (H) 11.5 - 14.5 %    PLATELET 092 281 - 635 K/uL    NEUTROPHILS 70 32 - 75 %    LYMPHOCYTES 23 12 - 49 %    MONOCYTES 4 (L) 5 - 13 %    EOSINOPHILS 3 0 - 7 %    BASOPHILS 0 0 - 1 %    ABS. NEUTROPHILS 6.9 1.8 - 8.0 K/UL    ABS. LYMPHOCYTES 2.3 0.8 - 3.5 K/UL    ABS. MONOCYTES 0.4 0.0 - 1.0 K/UL    ABS. EOSINOPHILS 0.2 0.0 - 0.4 K/UL    ABS.  BASOPHILS 0.0 0.0 - 0.1 K/UL   MAGNESIUM    Collection Time: 02/19/17  6:06 AM   Result Value Ref Range    Magnesium 2.0 1.6 - 2.4 mg/dL   PHOSPHORUS    Collection Time: 02/19/17  6:06 AM   Result Value Ref Range    Phosphorus 3.3 2.6 - 4.7 MG/DL   GLUCOSE, POC    Collection Time: 02/19/17  7:22 AM   Result Value Ref Range    Glucose (POC) 197 (H) 65 - 100 mg/dL    Performed by [facility]    Hospital Problems:  Hospital Problems  Date Reviewed: 2/17/2017          Codes Class Noted POA    * (Principal)CHF exacerbation ([facility] ICD-10-CM: I50.9  ICD-9-CM: 428.0  2/16/2017 Unknown        CKD (chronic kidney disease) stage 4, GFR 15-29 ml/min (HCC) (Chronic) ICD-10-CM: N18.4  ICD-9-CM: 585.4  2/10/2017 Yes        DM (diabetes mellitus), type 2 with renal complications (HCC) (Chronic) ICD-10-CM: E11.29  ICD-9-CM: 250.40  8/9/2013 Yes        HTN (hypertension) (Chronic) ICD-10-CM: I10  ICD-9-CM: 401.9  10/1/2012 Yes        Morbid obesity (Chronic) ICD-10-CM: E66.01  ICD-9-CM: 278.01  10/1/2012 Yes        Esophageal reflux ICD-10-CM: K21.9  ICD-9-CM: 530.81  10/1/2012 Yes

## 2017-02-19 NOTE — PROGRESS NOTES
[facility] Dosing Services: 2/19/17  Consult for Warfarin Dosing by Pharmacy by Dr. Cici Núñez provided for this 63 y.o. female for indication of Hx of Right leg DVT  Day of Therapy: resumed from home. (PTA: Warfarin 4mg daily at home but INR was 5.4 on admission)  Dose to achieve an INR goal of 2-3    Order entered for  Warfarin  2 (mg) ordered to be given today at 18:00. Significant drug interactions:  3.5 mg of Vitamin K given on 2/17/17  Previous dose given 2 mg   PT/INR Lab Results   Component Value Date/Time    INR 1.9 02/19/2017 01:37 PM    INR POC 5.4 02/16/2017 03:59 PM      Platelets Lab Results   Component Value Date/Time    PLATELET 998 79/68/7787 06:06 AM      H/H Lab Results   Component Value Date/Time    HGB 9.1 02/19/2017 06:06 AM        Pharmacy to follow daily and will provide subsequent Warfarin dosing based on clinical status.   Mina Rios)  Contact information [phone]

## 2017-02-20 VITALS
SYSTOLIC BLOOD PRESSURE: 167 MMHG | OXYGEN SATURATION: 97 % | RESPIRATION RATE: 19 BRPM | WEIGHT: 293 LBS | HEIGHT: 70 IN | BODY MASS INDEX: 41.95 KG/M2 | HEART RATE: 68 BPM | DIASTOLIC BLOOD PRESSURE: 65 MMHG | TEMPERATURE: 97.6 F

## 2017-02-20 LAB
ALBUMIN SERPL BCP-MCNC: 2.8 G/DL (ref 3.5–5)
ALBUMIN/GLOB SERPL: 0.9 {RATIO} (ref 1.1–2.2)
ALP SERPL-CCNC: 61 U/L (ref 45–117)
ALT SERPL-CCNC: 18 U/L (ref 12–78)
ANION GAP BLD CALC-SCNC: 7 MMOL/L (ref 5–15)
AST SERPL W P-5'-P-CCNC: 4 U/L (ref 15–37)
BASOPHILS # BLD AUTO: 0 K/UL (ref 0–0.1)
BASOPHILS # BLD: 0 % (ref 0–1)
BILIRUB SERPL-MCNC: 0.4 MG/DL (ref 0.2–1)
BUN SERPL-MCNC: 50 MG/DL (ref 6–20)
BUN/CREAT SERPL: 15 (ref 12–20)
CALCIUM SERPL-MCNC: 8 MG/DL (ref 8.5–10.1)
CHLORIDE SERPL-SCNC: 105 MMOL/L (ref 97–108)
CO2 SERPL-SCNC: 28 MMOL/L (ref 21–32)
CREAT SERPL-MCNC: 3.32 MG/DL (ref 0.55–1.02)
EOSINOPHIL # BLD: 0.2 K/UL (ref 0–0.4)
EOSINOPHIL NFR BLD: 2 % (ref 0–7)
ERYTHROCYTE [DISTWIDTH] IN BLOOD BY AUTOMATED COUNT: 16.1 % (ref 11.5–14.5)
GLOBULIN SER CALC-MCNC: 3.2 G/DL (ref 2–4)
GLUCOSE BLD STRIP.AUTO-MCNC: 171 MG/DL (ref 65–100)
GLUCOSE BLD STRIP.AUTO-MCNC: 186 MG/DL (ref 65–100)
GLUCOSE BLD STRIP.AUTO-MCNC: 253 MG/DL (ref 65–100)
GLUCOSE SERPL-MCNC: 180 MG/DL (ref 65–100)
HCT VFR BLD AUTO: 28.6 % (ref 35–47)
HGB BLD-MCNC: 8.7 G/DL (ref 11.5–16)
INR PPP: 2.1 (ref 0.9–1.1)
LYMPHOCYTES # BLD AUTO: 23 % (ref 12–49)
LYMPHOCYTES # BLD: 2.1 K/UL (ref 0.8–3.5)
MAGNESIUM SERPL-MCNC: 2.1 MG/DL (ref 1.6–2.4)
MCH RBC QN AUTO: 29 PG (ref 26–34)
MCHC RBC AUTO-ENTMCNC: 30.4 G/DL (ref 30–36.5)
MCV RBC AUTO: 95.3 FL (ref 80–99)
MONOCYTES # BLD: 0.5 K/UL (ref 0–1)
MONOCYTES NFR BLD AUTO: 6 % (ref 5–13)
NEUTS SEG # BLD: 6.3 K/UL (ref 1.8–8)
NEUTS SEG NFR BLD AUTO: 69 % (ref 32–75)
PHOSPHATE SERPL-MCNC: 3.1 MG/DL (ref 2.6–4.7)
PLATELET # BLD AUTO: 151 K/UL (ref 150–400)
POTASSIUM SERPL-SCNC: 4.6 MMOL/L (ref 3.5–5.1)
PROT SERPL-MCNC: 6 G/DL (ref 6.4–8.2)
PROTHROMBIN TIME: 21.7 SEC (ref 9–11.1)
RBC # BLD AUTO: 3 M/UL (ref 3.8–5.2)
SERVICE CMNT-IMP: ABNORMAL
SODIUM SERPL-SCNC: 140 MMOL/L (ref 136–145)
WBC # BLD AUTO: 9.1 K/UL (ref 3.6–11)

## 2017-02-20 PROCEDURE — 85025 COMPLETE CBC W/AUTO DIFF WBC: CPT | Performed by: FAMILY MEDICINE

## 2017-02-20 PROCEDURE — 97116 GAIT TRAINING THERAPY: CPT

## 2017-02-20 PROCEDURE — 82962 GLUCOSE BLOOD TEST: CPT

## 2017-02-20 PROCEDURE — 84100 ASSAY OF PHOSPHORUS: CPT | Performed by: FAMILY MEDICINE

## 2017-02-20 PROCEDURE — 94660 CPAP INITIATION&MGMT: CPT

## 2017-02-20 PROCEDURE — 74011000250 HC RX REV CODE- 250: Performed by: INTERNAL MEDICINE

## 2017-02-20 PROCEDURE — 80053 COMPREHEN METABOLIC PANEL: CPT | Performed by: FAMILY MEDICINE

## 2017-02-20 PROCEDURE — 94640 AIRWAY INHALATION TREATMENT: CPT

## 2017-02-20 PROCEDURE — 74011250637 HC RX REV CODE- 250/637: Performed by: FAMILY MEDICINE

## 2017-02-20 PROCEDURE — 74011250637 HC RX REV CODE- 250/637: Performed by: SPECIALIST

## 2017-02-20 PROCEDURE — 77010033678 HC OXYGEN DAILY

## 2017-02-20 PROCEDURE — 74011250637 HC RX REV CODE- 250/637: Performed by: INTERNAL MEDICINE

## 2017-02-20 PROCEDURE — 74011636637 HC RX REV CODE- 636/637: Performed by: FAMILY MEDICINE

## 2017-02-20 PROCEDURE — 36415 COLL VENOUS BLD VENIPUNCTURE: CPT | Performed by: FAMILY MEDICINE

## 2017-02-20 PROCEDURE — 97161 PT EVAL LOW COMPLEX 20 MIN: CPT

## 2017-02-20 PROCEDURE — 83735 ASSAY OF MAGNESIUM: CPT | Performed by: FAMILY MEDICINE

## 2017-02-20 PROCEDURE — 74011000250 HC RX REV CODE- 250: Performed by: FAMILY MEDICINE

## 2017-02-20 PROCEDURE — 85610 PROTHROMBIN TIME: CPT | Performed by: FAMILY MEDICINE

## 2017-02-20 RX ORDER — METOLAZONE 5 MG/1
5 TABLET ORAL ONCE
Status: DISCONTINUED | OUTPATIENT
Start: 2017-02-20 | End: 2017-02-20

## 2017-02-20 RX ORDER — DEXTROMETHORPHAN POLISTIREX 30 MG/5 ML
SUSPENSION, EXTENDED RELEASE 12 HR ORAL
Status: COMPLETED | OUTPATIENT
Start: 2017-02-20 | End: 2017-02-20

## 2017-02-20 RX ORDER — WARFARIN 2 MG/1
2 TABLET ORAL EVERY EVENING
Status: DISCONTINUED | OUTPATIENT
Start: 2017-02-20 | End: 2017-02-20 | Stop reason: HOSPADM

## 2017-02-20 RX ORDER — WARFARIN 2 MG/1
2 TABLET ORAL DAILY
Qty: 15 TAB | Refills: 0 | Status: SHIPPED | OUTPATIENT
Start: 2017-02-20 | End: 2017-02-23 | Stop reason: SDUPTHER

## 2017-02-20 RX ORDER — POLYETHYLENE GLYCOL 3350 17 G/17G
17 POWDER, FOR SOLUTION ORAL DAILY
Qty: 15 PACKET | Refills: 0 | Status: SHIPPED | OUTPATIENT
Start: 2017-02-20 | End: 2017-02-28

## 2017-02-20 RX ORDER — HYDRALAZINE HYDROCHLORIDE 25 MG/1
25 TABLET, FILM COATED ORAL 3 TIMES DAILY
Qty: 90 TAB | Refills: 0 | Status: SHIPPED | OUTPATIENT
Start: 2017-02-20 | End: 2017-03-29

## 2017-02-20 RX ORDER — BUMETANIDE 2 MG/1
2 TABLET ORAL 2 TIMES DAILY
Qty: 60 TAB | Refills: 0 | Status: SHIPPED | OUTPATIENT
Start: 2017-02-20 | End: 2017-04-22 | Stop reason: SDUPTHER

## 2017-02-20 RX ORDER — METOLAZONE 5 MG/1
5 TABLET ORAL
Status: COMPLETED | OUTPATIENT
Start: 2017-02-20 | End: 2017-02-20

## 2017-02-20 RX ORDER — BUMETANIDE 1 MG/1
2 TABLET ORAL 2 TIMES DAILY
Status: DISCONTINUED | OUTPATIENT
Start: 2017-02-20 | End: 2017-02-20 | Stop reason: HOSPADM

## 2017-02-20 RX ORDER — AMOXICILLIN 250 MG
2 CAPSULE ORAL DAILY
Qty: 20 TAB | Refills: 0 | Status: SHIPPED | OUTPATIENT
Start: 2017-02-20 | End: 2017-02-28 | Stop reason: CLARIF

## 2017-02-20 RX ORDER — ADHESIVE BANDAGE
30 BANDAGE TOPICAL DAILY
Status: DISCONTINUED | OUTPATIENT
Start: 2017-02-20 | End: 2017-02-20 | Stop reason: HOSPADM

## 2017-02-20 RX ORDER — HYDRALAZINE HYDROCHLORIDE 25 MG/1
25 TABLET, FILM COATED ORAL 3 TIMES DAILY
Status: DISCONTINUED | OUTPATIENT
Start: 2017-02-20 | End: 2017-02-20 | Stop reason: HOSPADM

## 2017-02-20 RX ADMIN — SUCRALFATE 1 G: 1 TABLET ORAL at 16:22

## 2017-02-20 RX ADMIN — Medication 10 ML: at 14:00

## 2017-02-20 RX ADMIN — PANTOPRAZOLE SODIUM 40 MG: 40 TABLET, DELAYED RELEASE ORAL at 16:22

## 2017-02-20 RX ADMIN — POLYETHYLENE GLYCOL 3350 17 G: 17 POWDER, FOR SOLUTION ORAL at 09:28

## 2017-02-20 RX ADMIN — METOLAZONE 5 MG: 5 TABLET ORAL at 16:22

## 2017-02-20 RX ADMIN — MAGNESIUM HYDROXIDE 30 ML: 400 SUSPENSION ORAL at 09:30

## 2017-02-20 RX ADMIN — ASPIRIN 81 MG CHEWABLE TABLET 81 MG: 81 TABLET CHEWABLE at 09:30

## 2017-02-20 RX ADMIN — SUCRALFATE 1 G: 1 TABLET ORAL at 13:29

## 2017-02-20 RX ADMIN — CARVEDILOL 25 MG: 12.5 TABLET, FILM COATED ORAL at 09:30

## 2017-02-20 RX ADMIN — INSULIN LISPRO 7 UNITS: 100 INJECTION, SOLUTION INTRAVENOUS; SUBCUTANEOUS at 13:41

## 2017-02-20 RX ADMIN — AMLODIPINE BESYLATE 10 MG: 5 TABLET ORAL at 09:29

## 2017-02-20 RX ADMIN — IPRATROPIUM BROMIDE AND ALBUTEROL SULFATE 3 ML: .5; 2.5 SOLUTION RESPIRATORY (INHALATION) at 15:32

## 2017-02-20 RX ADMIN — HYDRALAZINE HYDROCHLORIDE 25 MG: 25 TABLET, FILM COATED ORAL at 16:26

## 2017-02-20 RX ADMIN — IPRATROPIUM BROMIDE AND ALBUTEROL SULFATE 3 ML: .5; 2.5 SOLUTION RESPIRATORY (INHALATION) at 08:05

## 2017-02-20 RX ADMIN — INSULIN LISPRO 3 UNITS: 100 INJECTION, SOLUTION INTRAVENOUS; SUBCUTANEOUS at 09:25

## 2017-02-20 RX ADMIN — BUMETANIDE 2 MG: 1 TABLET ORAL at 16:26

## 2017-02-20 RX ADMIN — Medication 10 ML: at 05:31

## 2017-02-20 RX ADMIN — Medication 2 TABLET: at 09:30

## 2017-02-20 RX ADMIN — SUCRALFATE 1 G: 1 TABLET ORAL at 09:30

## 2017-02-20 RX ADMIN — MINERAL OIL: 100 ENEMA RECTAL at 13:32

## 2017-02-20 RX ADMIN — PANTOPRAZOLE SODIUM 40 MG: 40 TABLET, DELAYED RELEASE ORAL at 09:30

## 2017-02-20 RX ADMIN — ISOSORBIDE MONONITRATE 120 MG: 60 TABLET ORAL at 09:30

## 2017-02-20 RX ADMIN — BUMETANIDE 2 MG: 0.25 INJECTION, SOLUTION INTRAMUSCULAR; INTRAVENOUS at 05:31

## 2017-02-20 NOTE — PROGRESS NOTES
[address] [facility] [place]  YOB: 1955          Assessment & Plan:   CKD 4  · Creat stable  · Watch renal function with diuresis    Edema  · On increased diuretic. Good output. · Pt frustrated that progress is slow and she is still edematous  · Try stopping amlodipine and replace with hydralazine  · Give one dose metolazone today    HTN  · On coreg and amlodipine  · Improved  · Adjust regimen due to edema    Anemia  · Procrit       Subjective:   CC: CKD management  HPI: Renal function stable. Good UOP. HTN is better. Pt is frustrated that she does not feel the edema has improved.    ROS: +edema, no sob/n/v  Current Facility-Administered Medications   Medication Dose Route Frequency    magnesium hydroxide (MILK OF MAGNESIA) 400 mg/5 mL oral suspension 30 mL  30 mL Oral DAILY    mineral oil (FLEET) enema   Rectal NOW    albuterol-ipratropium (DUO-NEB) 2.5 MG-0.5 MG/3 ML  3 mL Nebulization TID    Warfarin-pharmacy to dose   Other Rx Dosing/Monitoring    pantoprazole (PROTONIX) tablet 40 mg  40 mg Oral ACB&D    0.9% sodium chloride infusion 250 mL  250 mL IntraVENous PRN    polyethylene glycol (MIRALAX) packet 17 g  17 g Oral DAILY    carvedilol (COREG) tablet 25 mg  25 mg Oral BID WITH MEALS    epoetin charlie (EPOGEN;PROCRIT) injection 20,000 Units  20,000 Units SubCUTAneous Q7D    sodium chloride (NS) flush 5-10 mL  5-10 mL IntraVENous Q8H    sodium chloride (NS) flush 5-10 mL  5-10 mL IntraVENous PRN    bisacodyl (DULCOLAX) tablet 5 mg  5 mg Oral DAILY PRN    nitroglycerin (NITROSTAT) tablet 0.4 mg  0.4 mg SubLINGual PRN    amLODIPine (NORVASC) tablet 10 mg  10 mg Oral DAILY    aspirin chewable tablet 81 mg  81 mg Oral DAILY    atorvastatin (LIPITOR) tablet 80 mg  80 mg Oral QPM    isosorbide mononitrate ER (IMDUR) tablet 120 mg  120 mg Oral DAILY    senna-docusate (PERICOLACE) 8.6-50 mg per tablet 2 Tab  2 Tab Oral DAILY    sucralfate (CARAFATE) tablet 1 g  1 g Oral AC&HS    hydrALAZINE (APRESOLINE) tablet 25 mg  25 mg Oral TID PRN    bumetanide (BUMEX) injection 2 mg  2 mg IntraVENous Q12H    insulin lispro (HUMALOG) injection   SubCUTAneous AC&HS    glucose chewable tablet 16 g  4 Tab Oral PRN    dextrose (D50W) injection syrg 12.5-25 g  12.5-25 g IntraVENous PRN    glucagon (GLUCAGEN) injection 1 mg  1 mg IntraMUSCular PRN    oxyCODONE-acetaminophen (PERCOCET) 5-325 mg per tablet 1 Tab  1 Tab Oral Q6H PRN    acetaminophen (TYLENOL) tablet 500 mg  500 mg Oral Q6H PRN    ondansetron (ZOFRAN) injection 4 mg  4 mg IntraVENous Q6H PRN          Objective:     Vitals:  Blood pressure 135/71, pulse 75, temperature 97.6 °F (36.4 °C), resp. rate 18, height 5' 10\" (1.778 m), weight 158.5 kg (349 lb 6.9 oz), SpO2 99 %. Temp (24hrs), Av.1 °F (36.7 °C), Min:97.6 °F (36.4 °C), Max:98.6 °F (37 °C)      Intake and Output:      1901 -  0700  In: 840 [P.O.:840]  Out: 7924 [Urine:3850]    Physical Exam:               GENERAL ASSESSMENT: NAD  CHEST: diminished at bases  HEART: S1S2  ABDOMEN: Soft,NT  EXTREMITY: +EDEMA  NEURO:Grossly non focal          ECG/rhythm:    Data Review      No results for input(s): TNIPOC in the last 72 hours. No lab exists for component: ITNL   No results for input(s): CPK, CKMB, TROIQ in the last 72 hours.   Recent Labs      17   0530  17   0606  17   0458  17   0453   NA  140  140   --   143   K  4.6  4.6   --   4.1   CL  105  106   --   108   CO2  28  28   --   26   BUN  50*  51*   --   52*   CREA  3.32*  3.29*   --   3.25*   GLU  180*  192*   --   163*   PHOS  3.1  3.3   --   4.0   MG  2.1  2.0   --   1.9   CA  8.0*  8.1*   --   8.2*   ALB  2.8*  2.8*   --   2.7*   WBC  9.1  9.8  11.7*   --    HGB  8.7*  9.1*  9.0*   --    HCT  28.6*  28.3*  28.1*   --    PLT  151  162  164   --       Recent Labs      17   1337  17   2331  17   0453   INR  1.9*  1.9*  1.8* PTP  19.4*  19.0*  18.7*     Needs: urine analysis, urine sodium, protein and creatinine  Lab Results   Component Value Date/Time    Sodium urine, random 25 11/10/2014 12:40 PM    Creatinine, urine 115.07 02/04/2017 09:33 AM           : Breonna Conley MD  2/20/2017        [facility]:  [email or web address] [employer]  [place] office:  [address] [place] [address], [address] [place] [address],[address] [place] [address]  Phone: [phone]  Fax :     [phone]    [place] office:  [address] [facility], [address] [place] [address]  Phone - [phone]  Fax - [phone]

## 2017-02-20 NOTE — MED STUDENT NOTES
*ATTENTION:  This note has been created by a medical student for educational purposes only. Please do not refer to the content of this note for clinical decision-making, billing, or other purposes. Please see attending physicians note to obtain clinical information on this patient. *     MEDICAL STUDENT PROGRESS NOTE    Kaela Bailey [id number]  [id number]    1955  63 y.o.  female      Chief Complaint: SOB and chest pain. SUBJECTIVE:  Ms. Talib Herbert is a 64 y/o female with a pmhx of CAD, DVT (on coumadin), ILD, CKD Stage 4 (baseline since last admission ~3.5), HTN, IDDM, GERD, and COPD (baseline 2L O2) who was admitted for SOB and chest pain, treated for possible CHF exacerbation. No acute events overnight. The patient states that her chest pain is unchanged, still does not radiate, is worth with exertion and does not change with respiration. She had 1xBM yesterday, but states her abdominal pain is also unchanged, still with distension. She thinks her RAMESH has improved, especially on the L side (R with known clot). She denies any weakness/dizzyness. She has been on her baseline 2L NC this admission without desaturations and is tolerating PO. OBJECTIVE:  Vital signs: Tmax 37 HR 59-74  -188/57-87, most recent 146/75  RR 16-18 O2sats > 95% on 2L NC  Weight: 249 (254 on admission)  Ins: 240 PO  Outs:  2250 urine  Bowel movements x1. Physical exam:  General Appearance: Obese AA female sitting up in bed with CPAP in NAD. HEENT: NCAT. PERRL. Anicteric sclera. +Conjunctival pallor  CV: Normal rate, regular rhythm. S1 and S2 with no m/r/g. Radial and DP 2+ bilaterally. No JVD. Resp: Breathing non-labored when off CPAP. Lungs with soft expiratory wheezes improved since yesterday. Bibasilar crackles noted, R>L. Abd: Soft, mildly distended. BS in all 4 quadrants. Moderate tenderness to palpation of RUQ/epigastric/LUQ, improved since yesterday.    Extrem: 2+ pitting edema to lower extremities R>L (previous DVT to right). Tender to palpation over anterior surface as well as calf of both LE. Labs: Reviewed. BMP with stable Cr of 3.32, Glu of 180. CBC with stable WBC 9.1, Hb from 9.1 to 8.7, and plt stable at 151. INR this morning of 1.9 (on Warfarin). LFTs stable. Radiology:   CT abd/pelvis without contrast -   FINDINGS:   LUNG BASES: Evidence of chronic lung disease at the visualized lung bases  INCIDENTALLY IMAGED HEART AND MEDIASTINUM: Cardiomegaly with coronary artery  calcifications. Small moderate pericardial effusion  LIVER: No mass or biliary dilatation. GALLBLADDER: Status post cholecystectomy  SPLEEN: Calcified granulomas in the spleen  PANCREAS: No mass or ductal dilatation. ADRENALS: Unremarkable. KIDNEYS/URETERS: No hydronephrosis or renal stone. Indeterminate left renal  lesion measuring 13 mm, unable to further characterize without contrast material  STOMACH: Unremarkable. SMALL BOWEL: No dilatation or wall thickening. COLON: Findings compatible with moderate constipation  APPENDIX: Status post appendectomy  PERITONEUM: No ascites or pneumoperitoneum. RETROPERITONEUM: No lymphadenopathy or aortic aneurysm. REPRODUCTIVE ORGANS: Status post hysterectomy  URINARY BLADDER: No mass or calculus. BONES: No destructive bone lesion. ADDITIONAL COMMENTS: There is morbid patient obesity.     IMPRESSION:  Evidence of moderate constipation. No evidence of bowel obstruction or  perforation.     Medications:   Current Facility-Administered Medications:     magnesium hydroxide (MILK OF MAGNESIA) 400 mg/5 mL oral suspension 30 mL, 30 mL, Oral, DAILY, Kristel Burt MD, 30 mL at 02/20/17 0930    hydrALAZINE (APRESOLINE) tablet 25 mg, 25 mg, Oral, TID, Percy Thompson MD    metOLazone (ZAROXOLYN) tablet 5 mg, 5 mg, Oral, ONCE, Percy Thompson MD    bumetanide (BUMEX) tablet 2 mg, 2 mg, Oral, BID, Kristel Burt MD    albuterol-ipratropium (DUO-NEB) 2.5 MG-0.5 MG/3 ML, 3 mL, Nebulization, TID, Gibran Campos, DO, 3 mL at 02/20/17 0805    Warfarin-pharmacy to dose, , Other, Rx Dosing/Monitoring, Neeru Irene DO    pantoprazole (PROTONIX) tablet 40 mg, 40 mg, Oral, ACB&D, Melvincullen Pop, DO, 40 mg at 02/20/17 0930    0.9% sodium chloride infusion 250 mL, 250 mL, IntraVENous, PRN, Chance Berger MD    polyethylene glycol (MIRALAX) packet 17 g, 17 g, Oral, DAILY, Kristel Burt MD, 17 g at 02/20/17 5867    carvedilol (COREG) tablet 25 mg, 25 mg, Oral, BID WITH MEALS, May Nguyen MD, 25 mg at 02/20/17 0930    epoetin charlie (EPOGEN;PROCRIT) injection 20,000 Units, 20,000 Units, SubCUTAneous, Q7D, Cordell Palmer MD, 20,000 Units at 02/17/17 1620    sodium chloride (NS) flush 5-10 mL, 5-10 mL, IntraVENous, Q8H, Kristel Burt MD, 10 mL at 02/20/17 0531    sodium chloride (NS) flush 5-10 mL, 5-10 mL, IntraVENous, PRN, Chance Berger MD, 10 mL at 02/17/17 0333    bisacodyl (DULCOLAX) tablet 5 mg, 5 mg, Oral, DAILY PRN, Chance Berger MD, 5 mg at 02/19/17 2055    nitroglycerin (NITROSTAT) tablet 0.4 mg, 0.4 mg, SubLINGual, PRN, Chance Berger MD    aspirin chewable tablet 81 mg, 81 mg, Oral, DAILY, Kristel Burt MD, 81 mg at 02/20/17 0930    atorvastatin (LIPITOR) tablet 80 mg, 80 mg, Oral, QPM, Kristel Burt MD, 80 mg at 02/19/17 1801    isosorbide mononitrate ER (IMDUR) tablet 120 mg, 120 mg, Oral, DAILY, Kristel Burt MD, 120 mg at 02/20/17 0930    senna-docusate (PERICOLACE) 8.6-50 mg per tablet 2 Tab, 2 Tab, Oral, DAILY, Kristel Burt MD, 2 Tab at 02/20/17 0930    sucralfate (CARAFATE) tablet 1 g, 1 g, Oral, AC&HS, Kristel Burt MD, 1 g at 02/20/17 1329    hydrALAZINE (APRESOLINE) tablet 25 mg, 25 mg, Oral, TID PRN, Chance Berger MD, 25 mg at 02/19/17 0759    insulin lispro (HUMALOG) injection, , SubCUTAneous, AC&HS, Dileep Pereyra MD, 7 Units at 02/20/17 1341    glucose chewable tablet 16 g, 4 Tab, Oral, PRN, Dileep Pereyra, MD    dextrose (D50W) injection syrg 12.5-25 g, 12.5-25 g, IntraVENous, PRN, Corbin Holley MD    glucagon (GLUCAGEN) injection 1 mg, 1 mg, IntraMUSCular, PRN, Corbin Holley MD    oxyCODONE-acetaminophen (PERCOCET) 5-325 mg per tablet 1 Tab, 1 Tab, Oral, Q6H PRN, Bassem Ospina MD, 1 Tab at 02/19/17 1440    acetaminophen (TYLENOL) tablet 500 mg, 500 mg, Oral, Q6H PRN, Bassem Ospina MD    ondansetron [facility]) injection 4 mg, 4 mg, IntraVENous, Q6H PRN, Bassem Ospina MD, 4 mg at 02/19/17 2055      ASSESSMENT: Ms. Beka Brooks is a 62 y/o female with a pmhx of CAD, DVT (on coumadin), ILD, CKD Stage 4 (baseline since last admission ~3.5), HTN, IDDM, GERD, and COPD (baseline 2L O2) who was admitted for SOB and chest pain with a negative ACS workup, being treated for CHF exacerbation stable on her baseline 2L O2 by NC and tolerating PO. PLAN:  1) Shortness of breath - Likely 2/2 to CHF vs COPD exacerbation. Although Hx of DVT to RLE, patient is not hypoxic or tachycardic. Patient with wheezing on exam, but not diffuse and was on steroid taper when admitted. Likely 2/2 to CHF exacerbation given volume status. - Switch Bumex 2mg BID from IV to PO. Tolerating diuresis as Cr stable and good UOP. - Continue Duonebs for COPD component. - Strict I/Os, Fluid restriction of 1500mL, Daily weights.      2) Chest Pain - negative troponins x3 and ECG without ischemic changes. Echo on 2/2/17 with no wall motion abnormalities. - No further ischemic work up per cards.    3) Anemia - Chronic (Baseline ~8), s/p 2u pRBC on 2/17. Now with stable Hb of 8.7.  - Daily CBC to trend. - FOBT negative. - PPI increased to BID.      4) Coagulopathy with Hx of DVT: RLE DVT noted last admission, discharged on warfarin. INR found to be 6.1 on admission, given 1mg IV Vitamin K on 2/16 2/2 to concern for possible bleed.   FOBT negative and Hb stable, so resumed warfarin with INR this AM of 1.9.   - Continue Warfarin  - Repeat INR tomorrow. - DVT stable on repeat lower extremity doppler.      5) Constipation: No BM in 2 weeks prior to admission. CT ordered yesterday 2/2 to abdominal pain, no SBO. 1 BM yesterday with current BR, however still with discomfort. - Current BR: Miralax, Bisocody, Docusate, Senna  - Fleet enema ordered.    6) Stage 4 CKD - Cr on admission 3.15 (baseline ~3.5), today 3.32, appears to be tolerating Bumex. - continue to trend Cr on daily BMP  - renal consulted, recommend continued diuresis.    7) Insulin Depended Type 2 DM - POCG ranging from 176 --> 211 with 10U Lispro  - POCG TID and QHS  - SSI      8) HTN - Home meds include Amlodipine (10mg every day), Imdur (120mg every day), Bumex (1mg PO every day), and Coreg 25mg BID. - Stop amlodipine and switch to Hydralazine 25mg TID per renal.     - Continue to trend.      F- PO  E - Stable, trend Cr  N - Cardiac diet. Fluid restrict to 1.5L  GI - Home PPI increased to BID  DVT - Warfarin   Code - DNR/DNI  Dispo - Oxygenation stable on home 2L NC and tolerating PO. PT to see prior to planned discharge today. Ros Saucedo, M4 student  I saw and evaluated the patient, performing the key elements of the service. I discussed the findings, assessment and plan with the resident and agree with the resident's findings and plan as documented in the resident's note.

## 2017-02-20 NOTE — PROGRESS NOTES
[facility]   Daily Progress Note    Date: 2/20/2017    Assessment/Plan:   Marlo Cross is a 64 yo female PMH of HFpEF, HTN, CAD s/p stent, DVT, CKD4, ILD, GERD, IDDM admitted for weight gain, SOB and chest pain concerning for CHF exacerbation. 24 Hour Events: No acute events.      CHF Exacerbation: Chest tightness, SOB unchanged. UO: -2L over the last 24 hours. Cardiology does not recommend any further ischemic w/u at this time.  -O2 as needed, CPAP qhs  -Bumex 2mg IV q12hr    -Duo-nebs q4hr   -Daily weights, strict I&O, fluid restriction  -Consult to cardiology, appreciate recs     Hx of COPD: likely contributing to CHF picture. -Duo-nebs. -O2 as needed    Constipation: Seems acute on chronic; small BM yesterday. CT abd shows moderate constipation. No evidence of bowel obstruction or perforation. Abdominal tenderness continues.   -Will try fleet enema this AM.   -Continue miralax and dulcolax     HTN: Stable. 147/75 this AM   - Continue imdur, norvasc and bumex, coreg   - Hydralazine 25mg TID PRN added for BP > 150/90  - Monitor BP closely     Anemia: Stable at 8.7 this AM. S/p 2 units PRBCs on 2/17. FOBT negative. - Monitor with CBC    Coagulopathy: INR pending this AM. 1.9 yesterday. - On warfarin; pharmacy to dose      Hx of chronic DVT: recent hx (2/2/17) of RLE DVT. Repeat BLE doppler f/u showed DVT stable  - Continue Warfarin   - Consider V/Q scan given patient's persistent chest pain-will discuss with team    CKD Stage 4: stable, Cr this AM 3.29 from 3.25. Baseline ~3.5.   -Caution with nephrotoxic drugs  -Daily BMP while on bumex   -Consulted to nephrology, appreciate recs    IDDM:  POC glucose 190s. 12 units of lispro given. -SSI for now, POC glucose checks ACHS     CAD s/p stent: Plavix held during last visit. Per cardiology, no further ischemic workup. -ASA 81mg daily      GERD: Stable.    -Continue protonix 40mg daily and Carafate 1g QID    Morbid Obesity: BMI 51.2. >20lb weight gain in 6 days. -Monitor daily weights  -Encourage weight loss     ACS Rule out: resolved. trop 0.05 -> 0.04. NSR on telemetry. -ASA      FEN/GI - Cardiac diet. Activity - Up with assistance  DVT prophylaxis - SCDs  GI prophylaxis - Protonix   Disposition - Plan to d/c to home     CODE STATUS:  DNR    Ashleigh Durand MD  Family Medicine Resident    Pt discussed with Dr. Catrachita Hudson  No acute events overnight. Patient says she feels unchanged from yesterday-continues with chest pain, sob, and RAMESH. Pt severely constipated and has had a small bowel movement yesterday-abdominal pain continues. Denies chills, headaches, palpitations, nausea and vomiting.        Inpatient Medications  Current Facility-Administered Medications   Medication Dose Route Frequency    magnesium hydroxide (MILK OF MAGNESIA) 400 mg/5 mL oral suspension 30 mL  30 mL Oral DAILY    mineral oil (FLEET) enema   Rectal NOW    albuterol-ipratropium (DUO-NEB) 2.5 MG-0.5 MG/3 ML  3 mL Nebulization TID    Warfarin-pharmacy to dose   Other Rx Dosing/Monitoring    pantoprazole (PROTONIX) tablet 40 mg  40 mg Oral ACB&D    0.9% sodium chloride infusion 250 mL  250 mL IntraVENous PRN    polyethylene glycol (MIRALAX) packet 17 g  17 g Oral DAILY    carvedilol (COREG) tablet 25 mg  25 mg Oral BID WITH MEALS    epoetin charlie (EPOGEN;PROCRIT) injection 20,000 Units  20,000 Units SubCUTAneous Q7D    sodium chloride (NS) flush 5-10 mL  5-10 mL IntraVENous Q8H    sodium chloride (NS) flush 5-10 mL  5-10 mL IntraVENous PRN    bisacodyl (DULCOLAX) tablet 5 mg  5 mg Oral DAILY PRN    nitroglycerin (NITROSTAT) tablet 0.4 mg  0.4 mg SubLINGual PRN    amLODIPine (NORVASC) tablet 10 mg  10 mg Oral DAILY    aspirin chewable tablet 81 mg  81 mg Oral DAILY    atorvastatin (LIPITOR) tablet 80 mg  80 mg Oral QPM    isosorbide mononitrate ER (IMDUR) tablet 120 mg  120 mg Oral DAILY    senna-docusate (PERICOLACE) 8.6-50 mg per tablet 2 Tab  2 Tab Oral DAILY    sucralfate (CARAFATE) tablet 1 g  1 g Oral AC&HS    hydrALAZINE (APRESOLINE) tablet 25 mg  25 mg Oral TID PRN    bumetanide (BUMEX) injection 2 mg  2 mg IntraVENous Q12H    insulin lispro (HUMALOG) injection   SubCUTAneous AC&HS    glucose chewable tablet 16 g  4 Tab Oral PRN    dextrose (D50W) injection syrg 12.5-25 g  12.5-25 g IntraVENous PRN    glucagon (GLUCAGEN) injection 1 mg  1 mg IntraMUSCular PRN    oxyCODONE-acetaminophen (PERCOCET) 5-325 mg per tablet 1 Tab  1 Tab Oral Q6H PRN    acetaminophen (TYLENOL) tablet 500 mg  500 mg Oral Q6H PRN    ondansetron (ZOFRAN) injection 4 mg  4 mg IntraVENous Q6H PRN         Allergies  Allergies   Allergen Reactions    Contrast Dye [Iodine] Anaphylaxis    Levaquin [Levofloxacin] Nausea and Vomiting    Morphine Hives and Itching         Objective  Vitals:  Patient Vitals for the past 8 hrs:   Temp Pulse Resp BP SpO2   02/20/17 0805 - - - - 98 %   02/20/17 0700 - (!) 59 - - -   02/20/17 0522 98.1 °F (36.7 °C) 68 16 146/75 98 %   02/20/17 0132 - - - - 97 %   02/20/17 0039 98.1 °F (36.7 °C) 69 16 136/72 97 %         I/O:    Intake/Output Summary (Last 24 hours) at 02/20/17 0835  Last data filed at 02/20/17 0043   Gross per 24 hour   Intake              240 ml   Output             2250 ml   Net            -2010 ml     Last shift:       Last 3 shifts:    02/18 1901 - 02/20 0700  In: 840 [P.O.:840]  Out: 9378 [Urine:3850]    Physical Exam:  General: No acute distress. Alert. Cooperative. On CPAP. Head: Normocephalic. Atraumatic. Respiratory: Crackles bilaterally. Cardiovascular: RRR. Normal S1,S2. No m/r/g. Pulses 2+ throughout. GI: + bowel sounds. Abdomen TTP at upper abdomen. No rebound tenderness, Nondistended. Obese. Extremities: 2+ pitting edema bilaterally, + LE tenderness, no cords.      Laboratory Data  Recent Results (from the past 12 hour(s))   GLUCOSE, POC    Collection Time: 02/19/17  9:11 PM   Result Value Ref Range    Glucose (POC) 194 (H) 65 - 100 mg/dL    Performed by [facility] (PCT)    METABOLIC PANEL, COMPREHENSIVE    Collection Time: 02/20/17  5:30 AM   Result Value Ref Range    Sodium 140 136 - 145 mmol/L    Potassium 4.6 3.5 - 5.1 mmol/L    Chloride 105 97 - 108 mmol/L    CO2 28 21 - 32 mmol/L    Anion gap 7 5 - 15 mmol/L    Glucose 180 (H) 65 - 100 mg/dL    BUN 50 (H) 6 - 20 MG/DL    Creatinine 3.32 (H) 0.55 - 1.02 MG/DL    BUN/Creatinine ratio 15 12 - 20      GFR est AA 17 (L) >60 ml/min/1.73m2    GFR est non-AA 14 (L) >60 ml/min/1.73m2    Calcium 8.0 (L) 8.5 - 10.1 MG/DL    Bilirubin, total 0.4 0.2 - 1.0 MG/DL    ALT (SGPT) 18 12 - 78 U/L    AST (SGOT) 4 (L) 15 - 37 U/L    Alk. phosphatase 61 45 - 117 U/L    Protein, total 6.0 (L) 6.4 - 8.2 g/dL    Albumin 2.8 (L) 3.5 - 5.0 g/dL    Globulin 3.2 2.0 - 4.0 g/dL    A-G Ratio 0.9 (L) 1.1 - 2.2     CBC WITH AUTOMATED DIFF    Collection Time: 02/20/17  5:30 AM   Result Value Ref Range    WBC 9.1 3.6 - 11.0 K/uL    RBC 3.00 (L) 3.80 - 5.20 M/uL    HGB 8.7 (L) 11.5 - 16.0 g/dL    HCT 28.6 (L) 35.0 - 47.0 %    MCV 95.3 80.0 - 99.0 FL    MCH 29.0 26.0 - 34.0 PG    MCHC 30.4 30.0 - 36.5 g/dL    RDW 16.1 (H) 11.5 - 14.5 %    PLATELET 466 445 - 955 K/uL    NEUTROPHILS 69 32 - 75 %    LYMPHOCYTES 23 12 - 49 %    MONOCYTES 6 5 - 13 %    EOSINOPHILS 2 0 - 7 %    BASOPHILS 0 0 - 1 %    ABS. NEUTROPHILS 6.3 1.8 - 8.0 K/UL    ABS. LYMPHOCYTES 2.1 0.8 - 3.5 K/UL    ABS. MONOCYTES 0.5 0.0 - 1.0 K/UL    ABS. EOSINOPHILS 0.2 0.0 - 0.4 K/UL    ABS.  BASOPHILS 0.0 0.0 - 0.1 K/UL   MAGNESIUM    Collection Time: 02/20/17  5:30 AM   Result Value Ref Range    Magnesium 2.1 1.6 - 2.4 mg/dL   PHOSPHORUS    Collection Time: 02/20/17  5:30 AM   Result Value Ref Range    Phosphorus 3.1 2.6 - 4.7 MG/DL   GLUCOSE, POC    Collection Time: 02/20/17  7:56 AM   Result Value Ref Range    Glucose (POC) 186 (H) 65 - 100 mg/dL    Performed by [facility]    Hospital Problems:  Hospital Problems  Date Reviewed: 2/17/2017          Codes Class Noted POA    * (Principal)CHF exacerbation ([facility] [address] ICD-10-CM: I50.9  ICD-9-CM: 428.0  2/16/2017 Unknown        CKD (chronic kidney disease) stage 4, GFR 15-29 ml/min (HCC) (Chronic) ICD-10-CM: N18.4  ICD-9-CM: 585.4  2/10/2017 Yes        DM (diabetes mellitus), type 2 with renal complications (HCC) (Chronic) ICD-10-CM: E11.29  ICD-9-CM: 250.40  8/9/2013 Yes        HTN (hypertension) (Chronic) ICD-10-CM: I10  ICD-9-CM: 401.9  10/1/2012 Yes        Morbid obesity (Chronic) ICD-10-CM: E66.01  ICD-9-CM: 278.01  10/1/2012 Yes        Esophageal reflux ICD-10-CM: K21.9  ICD-9-CM: 530.81  10/1/2012 Yes          I saw and evaluated the patient, performing the key elements of the service. I discussed the findings, assessment and plan with the resident and agree with the resident's findings and plan as documented in the resident's note. Pt seen and examined with residents. Reports she is about the same. she has lost about 5 pounds  VS-afebrile  Lungs: mild end exp wheezing but good air movement  Labs:wbc-9.1  A/p: CHF exarcebation-continue with bumex,dialy weight checks  Appreciate cardiology input

## 2017-02-20 NOTE — PROGRESS NOTES
[facility] Dosing Services: 2/20/17  Consult for Warfarin Dosing by Pharmacy by Dr. Miguelina Johnston provided for this 63 y.o. female for indication of Hx of Right leg DVT  Day of Therapy: resumed from home. (PTA: Warfarin 4mg daily at home but INR was 5.4 on admission)  Dose to achieve an INR goal of 2-3     Order entered for Warfarin 2 (mg) ordered to be given today at 18:00. Significant drug interactions: s/p 3.5 mg of Vitamin K given on 2/17/17  LABS    PT/INR Lab Results   Component Value Date/Time    INR 2.1 02/20/2017 02:22 PM    INR POC 5.4 02/16/2017 03:59 PM      Platelets Lab Results   Component Value Date/Time    PLATELET 896 56/21/0301 05:30 AM      H/H     Lab Results   Component Value Date/Time    HGB 8.7 02/20/2017 05:30 AM        Warfarin Administrations (last 168 hours)       Date/Time Action Medication Dose      02/19/17 1802 Given    warfarin (COUMADIN) tablet 2 mg 2 mg    02/18/17 1751 Given    warfarin (COUMADIN) tablet 2 mg 2 mg          Pharmacy to follow daily and will provide subsequent Warfarin dosing based on clinical status. Thank you for the consult,  Pollo Gong

## 2017-02-20 NOTE — CARDIO/PULMONARY
Cardiac Rehab: 2/20/2017 @ 1630:  62 yo female admitted from PCP with unexpected wt gain and SOB (2/16/17). Admitted for CHF exacerbation. Per Dr Bryan Galindo, CAD stable; suspect third spacing due to HTN, renal dysfunction, worsening anemia, and Norvasc. Admission INR 6.1 and Hgb 8.5>7.4. S/P blood transfusion (2/17). Cardiac history significant for chronic diastolic CHF. LVEF 75% by echo (2/4/17). Cardiologist is Dr Yuri Pearson. Initial CHF education done 2/16/17 by DAWIT Shaw RN (See notes)  Met with pt sitting up in chair on [facility] dressed and ready for d/c. Pt reported having CHF packet. Reviewed CHF goals for home health promotion. Pt voiced concerns about still \"not losing enough fluid weight and still feeling SOB. \" Discussed F/U appt with PCP on 2/23  Cardiac Meds:  ACE/ARB - none (creat>2)  BB - carvedilol   Statin - atorvastatin   ASA - 81 mg  Prior to admission meds also included: warfarin, amlodipine, Imdur, Bumex. Diet education: Reviewed importance of low Na diet management, weight management, reading labels, portion control and foods to avoid. Fluid intake education:   Pt is on 1500 ml fluid restriction. Reviewed importance of of f/u with nephrology and maintaining fluid restriction. Daily Weights: Pt has scale and reported she is aware of daily weights and will do this. Discussed importance of following daily weights and recording to monitor for fluid volume overload. Discussed need to call MD if gains 3 lbs in one day, or 5 lbs in one week. Activity/Excersie: Pt reported she does some walking but no routine exercise due to back and knees. She reported she will be starting pulmonary rehab at [facility]. Smoking cessation: Former smoker, quit 2014, on O2 at home 2L/NC  Medication Education: Reviewed meds. Pt has pill container at home at home and reported she has no problem with obtaining her meds. Discussed stopping Amlodipine for new meds and change in Bumex.    New PO meds this admission: Hydralazine. Discussed purpose and side effects. Provide handout for med. Concern for knowledge deficit: Pt verbalized understanding of information provided. All question answered.

## 2017-02-20 NOTE — PROGRESS NOTES
Bedside and Verbal shift change report given to Danie Knowles (oncoming nurse) by Paulo Acevedo (offgoing nurse). Report included the following information SBAR, Kardex, ED Summary, Intake/Output, MAR, Accordion, Recent Results, Med Rec Status and Cardiac Rhythm NSR.

## 2017-02-20 NOTE — PROGRESS NOTES
Bedside and Verbal shift change report given to Inez Bailey RN (oncoming nurse) by Virgen Avila RN (offgoing nurse). Report included the following information SBAR, Kardex, Procedure Summary, Intake/Output, MAR, Recent Results and Cardiac Rhythm SR. Pt requested prn Dulcolax; reported a small bowel movement today. Pt requests prn percocet with zofran to control nausea caused by pain medication. Pt remains on a 1500 mL fluid restriction.

## 2017-02-20 NOTE — NURSE NAVIGATOR
Heart Failure Nurse Navigator Note: Met with patient at bedside and introduced self and role of HF Nurse Navigator. Heart Failure Education Folder at bedside from cardiac rehab. Briefly reviewed low salt diet, daily weights and signs and symptoms of heart failure. Reviewed Heart Failure Zones and when to call the physician. Pt notes that she still feels as if she has swelling and shortness of breath on exertion. Family practice is planning to re-evaluate and possible discharge later today. Pt was agreeable to NN scheduling f/u appt with PCP. Appt scheduled for 2/23 and placed on AVS.  Pt states her  drives her to appts. Pt has established with Alex Pascual with [facility] with office visit note noted on [facility] on  2/16. Sherry Guido in Patient access notified to enter PCP on record.

## 2017-02-20 NOTE — MED STUDENT NOTES
*ATTENTION:  This note has been created by a medical student for educational purposes only. Please do not refer to the content of this note for clinical decision-making, billing, or other purposes. Please see attending physicians note to obtain clinical information on this patient. *     Medical Student PED DISCHARGE SUMMARY      Patient: Shine Honeycutt MRN: [id number]  SSN: [id number]    YOB: 1955  Age: 63 y.o. Sex: female      Admitting Diagnosis: CHF exacerbation    Discharge Diagnosis: CHF exacerbation    Primary Care Physician: None (LOBB)    HPI: Shine Honeycutt is a 63 y.o. female with a PMH of HFpEF, HTN, CAD s/p stent, DVT, CKD stage 4, morbid obesity, ILD, GERD, IDDM who presents to the ER complaining of SOB, weight gain, and chest tightness. She states that she has felt SOB since discharge on 2/10, but that it has been getting progressively worse. She is on 2L NC baseline at home. Since discharge she has been tapering her prednisone (today is her last day at 10mg) and using her albuterol nebs 1xday. She denies any fevers, although she has had chills and a productive cough. She notes that she has gained 28lbs since being discharged despite taking her Bumex as prescribed. She has baseline orthopnea requiring 4 pillows to sleep, which is unchanged. She notes that she has substernal chest pressure that is aggravated with exertion and relieved with rest, and resolved after taking nitroglycerin twice in the past week. She states that she currently has the same pressure, and that it does not radiate.      In the ER, vital signs were remarkable for BP of 182/80, RR 24 on 2L NC. Labs were remarkable for WBC of 14.0, Hg 8.5 (baseline ~9), Cr of 3.15 (baseline ~3.5), INR 6.1. EKG NSR. CXR showed stable opacities in bilateral LL and RUL. No treatment given in ER. Hospital Course: Ms. Toya Card is a 63year old female who was admitted for a CHF exacerbation.   She required only her baseline   Shortness of breath - Patient was not tachycardic and did not require more than her baseline 2L NC for oxygenation, therefore it was felt that a PE was unlikely. Given her recent weight gain and crackles on exam, it was felt as though a CHF exacerbation was the most likely cause of her symptoms. She was started on a 1500 mL fluid restriction and tolerated diuresis with 2mg Bumex IV BID with a stable Cr of ~3.3 with a drop in her weight from 354 to 349 on time of discharge. Her Bumex was switched to PO. Duonebs were also provided as the patient had some wheezing and a COPD component could be contributing, and her wheezing improved throughout the hospital course. - Discharged with Bumex dose increased to 2mg PO BID.   - Follow up with PCP for weight check and OP BMP. Constipation - Patient reported no BM in the past 2 weeks. At home she was on Bisacodyl, but denied use of Miralax. She was exquisitely tender to palpation, however a KUB/CT abd/pelvis showed only constipation. She responded to BR of Miralax, Bisacodyl, Docusate, and Senna and had enema this afternoon.  - Continue miralax and bisacodyl upon discharge. Anemia - The patient has chronic anemia (baseline ~8), however her Hb dropped to 7.4 and she was given 2U pRBC given her cardiac history. Her Hb then remained stable at ~9. FOBT was negative. - Follow up with repeat CBC with PCP.     Chest Pain - negative troponins x3 and ECG without ischemic changes. Has CAD s/p stend, however plavix held during last visit. No further ischemic work up per cardiology. - Continue home ASA 81 mg daily.        Coagulopathy with Hx of DVT: RLE DVT noted last admission, discharged on warfarin 4mg. INR found to be 6.1 on admission, given 1mg IV Vitamin K on 2/16 2/2 to concern for possible bleed given drop in Hb from 8.5 to 7.4.  FOBT negative and Hb was stable post transfusion, so resumed warfarin at 2mg with INR this AM of 1.9, which was repeated and found to be therapeutic at 2.1. Her DVT was stable on repeat lower extremity doppler.   - Continue Warfarin at decreased dose of 2mg.   - Follow up INR as OP in 2 days.       Stage 4 CKD - Cr on admission 3.15 (baseline ~3.5), today 3.32, appears to be tolerating new Bumex dose. - Follow up BMP with OP provider.       Insulin Depended Type 2 DM - Controlled on SSI. - Resume home NPH sliding scale. - Follow up with PCP regarding further changes.       HTN - Home meds include Amlodipine (10mg every day), Imdur (120mg every day), Bumex (1mg PO every day), and Coreg 25mg BID. - Stop amlodipine due to RAMESH  - Start Hydralazine 25mg TID  - Follow up BP with OP provider. Incidental L Renal Cyst - Noted on non-contrast abdomen/pelvis CT.    - Follow OP with Renal ultrasound. GERD - Stable, continue home protonix 40mg daily and Carafate 1g four times daily. Morbid Obesity - BMI 51.2 with > 20 weight gain in 6 days.  - Encourage weight loss.       Labs:   Recent Results (from the past 24 hour(s))   GLUCOSE, POC    Collection Time: 02/19/17  4:44 PM   Result Value Ref Range    Glucose (POC) 176 (H) 65 - 100 mg/dL    Performed by Mendoza Schmitz    GLUCOSE, POC    Collection Time: 02/19/17  9:11 PM   Result Value Ref Range    Glucose (POC) 194 (H) 65 - 100 mg/dL    Performed by Berny Mcclelland (PCT)    METABOLIC PANEL, COMPREHENSIVE    Collection Time: 02/20/17  5:30 AM   Result Value Ref Range    Sodium 140 136 - 145 mmol/L    Potassium 4.6 3.5 - 5.1 mmol/L    Chloride 105 97 - 108 mmol/L    CO2 28 21 - 32 mmol/L    Anion gap 7 5 - 15 mmol/L    Glucose 180 (H) 65 - 100 mg/dL    BUN 50 (H) 6 - 20 MG/DL    Creatinine 3.32 (H) 0.55 - 1.02 MG/DL    BUN/Creatinine ratio 15 12 - 20      GFR est AA 17 (L) >60 ml/min/1.73m2    GFR est non-AA 14 (L) >60 ml/min/1.73m2    Calcium 8.0 (L) 8.5 - 10.1 MG/DL    Bilirubin, total 0.4 0.2 - 1.0 MG/DL    ALT (SGPT) 18 12 - 78 U/L    AST (SGOT) 4 (L) 15 - 37 U/L Alk. phosphatase 61 45 - 117 U/L    Protein, total 6.0 (L) 6.4 - 8.2 g/dL    Albumin 2.8 (L) 3.5 - 5.0 g/dL    Globulin 3.2 2.0 - 4.0 g/dL    A-G Ratio 0.9 (L) 1.1 - 2.2     CBC WITH AUTOMATED DIFF    Collection Time: 02/20/17  5:30 AM   Result Value Ref Range    WBC 9.1 3.6 - 11.0 K/uL    RBC 3.00 (L) 3.80 - 5.20 M/uL    HGB 8.7 (L) 11.5 - 16.0 g/dL    HCT 28.6 (L) 35.0 - 47.0 %    MCV 95.3 80.0 - 99.0 FL    MCH 29.0 26.0 - 34.0 PG    MCHC 30.4 30.0 - 36.5 g/dL    RDW 16.1 (H) 11.5 - 14.5 %    PLATELET 025 327 - 629 K/uL    NEUTROPHILS 69 32 - 75 %    LYMPHOCYTES 23 12 - 49 %    MONOCYTES 6 5 - 13 %    EOSINOPHILS 2 0 - 7 %    BASOPHILS 0 0 - 1 %    ABS. NEUTROPHILS 6.3 1.8 - 8.0 K/UL    ABS. LYMPHOCYTES 2.1 0.8 - 3.5 K/UL    ABS. MONOCYTES 0.5 0.0 - 1.0 K/UL    ABS. EOSINOPHILS 0.2 0.0 - 0.4 K/UL    ABS. BASOPHILS 0.0 0.0 - 0.1 K/UL   MAGNESIUM    Collection Time: 02/20/17  5:30 AM   Result Value Ref Range    Magnesium 2.1 1.6 - 2.4 mg/dL   PHOSPHORUS    Collection Time: 02/20/17  5:30 AM   Result Value Ref Range    Phosphorus 3.1 2.6 - 4.7 MG/DL   GLUCOSE, POC    Collection Time: 02/20/17  7:56 AM   Result Value Ref Range    Glucose (POC) 186 (H) 65 - 100 mg/dL    Performed by Samuel Ellis    GLUCOSE, POC    Collection Time: 02/20/17 11:13 AM   Result Value Ref Range    Glucose (POC) 253 (H) 65 - 100 mg/dL    Performed by Adriana Herrera + INR    Collection Time: 02/20/17  2:22 PM   Result Value Ref Range    INR 2.1 (H) 0.9 - 1.1      Prothrombin time 21.7 (H) 9.0 - 11.1 sec         Radiology:    XR Chest PA LAT on 2/16/2017  History: Shortness of breath.     2 views of the chest demonstrates unremarkable cardiac and mediastinal contours. There is persistent bilateral lower lobe and right upper lobe airspace  opacification.  There are no effusions and the osseous structures are  unremarkable.     IMPRESSION  IMPRESSION: No significant interval change in bilateral lower lobe and right  upper lobe airspace opacification. EXAM: XR ABD ACUTE W 1 V CHEST  INDICATION: constipation  COMPARISON: X-ray of the abdomen, 2/7/2017. Chest x-ray, 2/16/2017 and  1/25/2017. Limitations: Obesity limits evaluation. Radha Kim FINDINGS:   The upright chest radiograph demonstrates opacities in the right upper lobe and  both bases. Calcified mediastinal lymph nodes. No visible pneumothorax or  pleural effusion. .  Supine and upright views of the abdomen demonstrate stool in the ascending and  transverse colon as well as in the rectum. No visible pneumoperitoneum. The  bones and soft tissues are within normal limits. .  IMPRESSION  IMPRESSION:  1. Lung opacities are not significantly changed. 2. Moderate stool burden. Abdominal/Pelvis CT without contrast  INDICATION: Small bowel obstruction concern, no bowel movement for 2 weeks,  increasing distention     COMPARISON: Plain films 2/17/2017     TECHNIQUE:   Thin axial images were obtained through the abdomen and pelvis. Coronal and  sagittal reconstructions were generated. Oral contrast was not administered. CT  dose reduction was achieved through use of a standardized protocol tailored for  this examination and automatic exposure control for dose modulation.      The absence of intravenous contrast material reduces the sensitivity for  evaluation of the solid parenchymal organs of the abdomen.      FINDINGS:   LUNG BASES: Evidence of chronic lung disease at the visualized lung bases  INCIDENTALLY IMAGED HEART AND MEDIASTINUM: Cardiomegaly with coronary artery  calcifications. Small moderate pericardial effusion  LIVER: No mass or biliary dilatation. GALLBLADDER: Status post cholecystectomy  SPLEEN: Calcified granulomas in the spleen  PANCREAS: No mass or ductal dilatation. ADRENALS: Unremarkable. KIDNEYS/URETERS: No hydronephrosis or renal stone.  Indeterminate left renal  lesion measuring 13 mm, unable to further characterize without contrast material  STOMACH: Unremarkable. SMALL BOWEL: No dilatation or wall thickening. COLON: Findings compatible with moderate constipation  APPENDIX: Status post appendectomy  PERITONEUM: No ascites or pneumoperitoneum. RETROPERITONEUM: No lymphadenopathy or aortic aneurysm. REPRODUCTIVE ORGANS: Status post hysterectomy  URINARY BLADDER: No mass or calculus. BONES: No destructive bone lesion. ADDITIONAL COMMENTS: There is morbid patient obesity.     IMPRESSION  IMPRESSION:  Evidence of moderate constipation. No evidence of bowel obstruction or  perforation. Pending Labs:  none    Discharge Exam:   Vital signs: Tmax 36.7  Tc 36.4 HR 74  /69  RR 20 O2sats 97% on 2L NC   Weight: 349    Physical Exam:  ***  General Appearance: Obese AA female sitting up in bed with CPAP in NAD. HEENT: NCAT. PERRL. Anicteric sclera. +Conjunctival pallor  CV: Normal rate, regular rhythm. S1 and S2 with no m/r/g. Radial and DP 2+ bilaterally. No JVD. Resp: Breathing non-labored when off CPAP. Lungs with soft expiratory wheezes improved since yesterday. Bibasilar crackles noted, improved. Abd: Soft, mildly distended. BS in all 4 quadrants. Moderate tenderness to palpation of RUQ/epigastric/LUQ, improved since yesterday. No guarding or rebound tenderness. Extrem: 2+ pitting edema to lower extremities R>L (known RLE DVT). Tender to palpation over anterior surface as well as calf of both LE. Discharge Condition: Stable    Discharge Medications:   Current Discharge Medication List      CONTINUE these medications which have NOT CHANGED    Details   bumetanide (BUMEX) 1 mg tablet Take 1 Tab by mouth two (2) times a day. Qty: 60 Tab, Refills: 0    Associated Diagnoses: Chronic diastolic heart failure (HCC)      senna-docusate (PERICOLACE) 8.6-50 mg per tablet Take 2 Tabs by mouth daily.   Qty: 28 Tab, Refills: 0      predniSONE (DELTASONE) 10 mg tablet Take 30mg for three days then 20mg for three days then 10mg for three days  Qty: 18 Tab, Refills: 0      warfarin (COUMADIN) 4 mg tablet Take 1 Tab by mouth daily. Qty: 15 Tab, Refills: 0      albuterol-ipratropium (DUONEB) 2.5 mg-0.5 mg/3 ml nebu 3 mL by Nebulization route three (3) times daily. sucralfate (CARAFATE) 1 gram tablet Take 1 g by mouth Before breakfast, lunch, dinner and at bedtime. fluticasone (FLONASE) 50 mcg/actuation nasal spray 2 Sprays by Both Nostrils route nightly. calcitRIOL (ROCALTROL) 0.25 mcg capsule Take 0.25 mcg by mouth two (2) days a week. Patient takes on Monday and Thursday      isosorbide mononitrate ER (IMDUR) 120 mg CR tablet TAKE 1 TABLET BY MOUTH EVERY DAY  Qty: 30 Tab, Refills: 5      carvedilol (COREG) 25 mg tablet TAKE 1 TABLET BY MOUTH TWICE A DAY  Qty: 180 Tab, Refills: 3      pantoprazole (PROTONIX) 40 mg tablet Take 1 Tab by mouth daily. Indications: GASTROESOPHAGEAL REFLUX  Qty: 90 Tab, Refills: 1      nitroglycerin (NITROSTAT) 0.3 mg SL tablet 1 Tab by SubLINGual route every five (5) minutes as needed for Chest Pain. Qty: 1 Bottle, Refills: 1    Associated Diagnoses: Atherosclerosis of native coronary artery of native heart with stable angina pectoris ([facility] [address] Angina, class III (HCC)      INSULIN LISPRO (HUMALOG SC) by SubCUTAneous route Before breakfast, lunch, and dinner. Sliding scale, stated usually using around 60units three times daily      amLODIPine (NORVASC) 10 mg tablet Take 10 mg by mouth daily. ergocalciferol (VITAMIN D2) 50,000 unit capsule Take 50,000 Units by mouth every Tuesday and Thursday. aspirin 81 mg tablet Take 81 mg by mouth daily. atorvastatin (LIPITOR) 80 mg tablet Take 80 mg by mouth every evening. ondansetron (ZOFRAN ODT) 4 mg disintegrating tablet Take 1 Tab by mouth every six (6) hours as needed for Nausea. Qty: 30 Tab, Refills: 0      polyethylene glycol (MIRALAX) 17 gram packet Take 1 Packet by mouth daily.   Qty: 14 Packet, Refills: 0      mupirocin (BACTROBAN) 2 % ointment Apply  to affected area two (2) times daily as needed (lesions on feet when needed). Ointment external two times daily to lesions on feet until healed - now using as needed      albuterol (PROVENTIL HFA, VENTOLIN HFA, PROAIR HFA) 90 mcg/actuation inhaler Take 2 Puffs by inhalation every four (4) hours as needed for Wheezing. oxyCODONE-acetaminophen (PERCOCET 7.5) 7.5-325 mg per tablet Take 1 Tab by mouth every four (4) hours as needed. Refills: 0         STOP taking these medications       linaclotide (LINZESS) 290 mcg cap capsule Comments:   Reason for Stopping:             Discharge Instructions:   START Hydralazine 25 mg, take three times a day. START Warfarin 2mg, take one pill every day. STOP Warfarin 4 mg  STOP Amlodipine 25mg  Increase the amount of Bumex that you are taking from 1mg twice a day to 2mg twice a day. Follow up with your PCP Dr. Sharonda Flores in 2 days for hospital follow up and repeat INR.    Please seek medical attention for any new or worsening symptoms particularly fever, chest pain, shortness of breath, abdominal pain, nausea, vomiting    Follow-up Care  Appointment with: Dr. Lee Part    Signed By: Yuliet Sim, M4 Student

## 2017-02-20 NOTE — PROGRESS NOTES
Problem: Mobility Impaired (Adult and Pediatric)  Goal: *Acute Goals and Plan of Care (Insert Text)  PHYSICAL THERAPY EVALUATION/DISCHARGE  Patient: Elian Carreno (10 y.o. female)  Date: 2/20/2017  Primary Diagnosis: CHF exacerbation ([facility] [address]        Precautions: 2LO2 at all times PTA     ASSESSMENT :  Based on the objective data described below, the patient presents with admission due to CHF exacerbation. Received sitting up in chair on 2LO2 with sats of 95%. Pt up ad harsha in room without need for asst device. Independent with all mobility. Modified 6MWT on 2L due to pt having home O2 24/7. On desaturation noted per below. Pt cleared to walk with nursing staff with RW provided and supp O2. Skilled physical therapy is not indicated at this time. RN notified. Documentation for home O2:     2LO2 AT REST    O2 SATS  95 HR  91   2LO2 WITH ACTIVITY 02 SATS  93 HR  100   (2    ) LITERS OF O2 WITH ACTIVITY O2 SATS  94 HR  103   (2    )LITERS OF 02 PATIENT LEFT COMFORTABLY  SITTING/SUPINE 02 SATS  93 HR  100                    PLAN :  Discharge Recommendations: None  Further Equipment Recommendations for Discharge: none       SUBJECTIVE:   Patient stated I feel better today.       OBJECTIVE DATA SUMMARY:   HISTORY:    Past Medical History   Diagnosis Date     Sleep Apnea 2/16/2011    Angina, class III ([address] 8/9/2013    Aortic aneurysm ([address]      CAD (coronary Artery Disease) Native Artery 2/16/2011    CKD (chronic kidney disease) stage 4, GFR 15-29 ml/min ([address] 2/10/2017    Diabetic gastroparesis (HCC)      Diastolic heart failure ([address] 10/5/2012    Esophageal stricture 2012       dialted Dr. Darryle Catching G6PD deficiency ([address]         trait    GERD (gastroesophageal reflux disease)      Hypertensive Cardiovasc Dis Benign, No CHF 2/16/2011    ILD (interstitial lung disease) ([address]         open lung bx CJW 2010    OA (osteoarthritis)      Obesity 2/16/2011    Ovarian cancer ([address]         cervical and uterine    Rheumatoid arteritis ([facility] [address]      T2DM (type 2 diabetes mellitus) ([facility] [address] 8/9/2013    Tobacco use disorder 3/21/2012    Uterine cervix cancer ([facility] [address]      Vitamin D deficiency 8/9/2013     Past Surgical History   Procedure Laterality Date    Hx orthopaedic   11/12/12       right knee replacement    Hx hysterectomy        Hx cholecystectomy        Hx appendectomy        Hx hernia repair        Hx carpal tunnel release           bilateral    Hx tonsil and adenoidectomy        Pr cardiac surg procedure unlist           stents    Upper gi endoscopy,dilatn w guide   6/24/2016            Colonoscopy N/A 6/24/2016       COLONOSCOPY performed by Cierra De Paz MD at [facility] [address]     Prior Level of Function/Home Situation: Independent; home O2 with portable and home concentrator; rollator for community  Personal factors and/or comorbidities impacting plan of care: CHF; Morbidly obese; DM; CKD; CAD;  Interstitial lung Dz.; Hx of DVT     Home Situation  Home Environment: Private residence  # Steps to Enter: 2  Rails to Enter: Yes  Hand Rails : Bilateral  One/Two Story Residence: One story  Living Alone: No  Support Systems: Spouse/Significant Other/Partner, Family member(s)  Patient Expects to be Discharged to[de-identified] Private residence  Current DME Used/Available at Home: Shower chair, Walker, rollator, Oxygen, portable  Tub or Shower Type: Shower     EXAMINATION/PRESENTATION/DECISION MAKING:   Critical Behavior:  Neurologic State: Alert  Orientation Level: Oriented X4  Cognition: Appropriate decision making, Follows commands  Safety/Judgement: Awareness of environment  Skin:  IV; 2L O2  Strength:    Strength: Generally decreased, functional                    Tone & Sensation:   Tone: Normal              Sensation: Impaired (neuropathy B LE's)              Range Of Motion:  AROM: Within functional limits                       Coordination:  Coordination: Generally decreased, functional     Functional Mobility:  Bed Mobility:  Rolling: Independent  Supine to Sit: Independent  Sit to Supine: Independent  Scooting: Independent  Transfers:  Sit to Stand: Independent  Stand to Sit: Modified independent (poor eccentric quads; needs to use hands to control descent)                       Balance:   Sitting: Intact  Standing: Intact  Ambulation/Gait Training:  Distance (ft): 350 Feet (ft)  Assistive Device: Walker, rolling;Gait belt  Ambulation - Level of Assistance: Modified independent                 Base of Support: Widened     Speed/Nichole: Slow  Step Length: Right shortened;Left shortened                                              Functional Measure:  Barthel Index:      Bathin  Bladder: 10  Bowels: 10  Groomin  Dressing: 10  Feeding: 10  Mobility: 15  Stairs: 10  Toilet Use: 10  Transfer (Bed to Chair and Back): 15  Total: 100         Barthel and G-code impairment scale:  Percentage of impairment CH  0% CI  1-19% CJ  20-39% CK  40-59% CL  60-79% CM  80-99% CN  100%   Barthel Score 0-100 100 99-80 79-60 59-40 20-39 1-19    0   Barthel Score 0-20 20 17-19 13-16 9-12 5-8 1-4 0      The Barthel ADL Index: Guidelines  1. The index should be used as a record of what a patient does, not as a record of what a patient could do. 2. The main aim is to establish degree of independence from any help, physical or verbal, however minor and for whatever reason. 3. The need for supervision renders the patient not independent. 4. A patient's performance should be established using the best available evidence. Asking the patient, friends/relatives and nurses are the usual sources, but direct observation and common sense are also important. However direct testing is not needed. 5. Usually the patient's performance over the preceding 24-48 hours is important, but occasionally longer periods will be relevant. 6. Middle categories imply that the patient supplies over 50 per cent of the effort.   7. Use of aids to be independent is allowed. Eloy Youssef., Barthel, D.W. ([id number]). Functional evaluation: the Barthel Index. [address] [facility] (14)2. YOVANNY Abebe, Raad Ha., [place]., Jacqueline Flores, [address] (1999). Measuring the change indisability after inpatient rehabilitation; comparison of the responsiveness of the Barthel Index and Functional [place] Measure. Journal of Neurology, Neurosurgery, and Psychiatry, 66(4), 697-411. CON Silva, JENIFFER Smith, & Lexis Eduardo MLISA. (2004.) Assessment of post-stroke quality of life in cost-effectiveness studies: The usefulness of the Barthel Index and the EuroQoL-5D. Quality of Life Research, 13, 204-85         G codes: In compliance with CMSs Claims Based Outcome Reporting, the following G-code set was chosen for this patient based on their primary functional limitation being treated: The outcome measure chosen to determine the severity of the functional limitation was the Barthel with a score of 100/100 which was correlated with the impairment scale.       · Mobility - Walking and Moving Around:               - CURRENT STATUS:    CH - 0% impaired, limited or restricted               - GOAL STATUS:           CH - 0% impaired, limited or restricted               - D/C STATUS:                       CH - 0% impaired, limited or restricted         Physical Therapy Evaluation Charge Determination   History Examination Presentation Decision-Making   HIGH Complexity :3+ comorbidities / personal factors will impact the outcome/ POC  LOW Complexity : 1-2 Standardized tests and measures addressing body structure, function, activity limitation and / or participation in recreation  LOW Complexity : Stable, uncomplicated  Other outcome measures Barthel  LOW       Based on the above components, the patient evaluation is determined to be of the following complexity level: LOW      Pain:  Pain Scale 1: Numeric (0 - 10)  Pain Intensity 1: 4 Activity Tolerance:   good  Please refer to the flowsheet for vital signs taken during this treatment. After treatment:   [X]   Patient left in no apparent distress sitting up in chair  [ ]   Patient left in no apparent distress in bed  [X]   Call bell left within reach  [X]   Nursing notified  [ ]   Caregiver present  [ ]   Bed alarm activated      COMMUNICATION/EDUCATION:   Communication/Collaboration:  [X]   Fall prevention education was provided and the patient/caregiver indicated understanding. [X]   Patient/family have participated as able and agree with findings and recommendations. [ ]   Patient is unable to participate in plan of care at this time.   Findings and recommendations were discussed with: Occupational Therapist and Registered Nurse     Thank you for this referral.  Kurt Payne, PT   Time Calculation: 20 mins

## 2017-02-20 NOTE — DIABETES MGMT
[facility]    Elevated Blood Glucose Note (POCT Glucose >180 mg/dL)    Recommendations/ Comments: Chart reviewed for elevated blood glucose. Jeffrey Chris is a 63 y.o. female with a past medical history significant for DM per Dr. Vicki Asher H&P dated 2017. Fasting glucose today: 180 mg/dL (per am lab). Required 17 units of correction in the last 24 hours. If appropriate, please consider the followin. Adding diabetic restrictions to current diet order   2. A basal insulin, such as Lantus 23 units daily*    *weight based dosage calculation (0.1-0.2 units/ kg/ day) based on AACE T2D Algorithm, Executive Summary, Endocr Pract. 2016; 22(No. 1)       Recent Glucose Results:   Lab Results   Component Value Date/Time     (H) 2017 05:30 AM    GLUCPOC 253 (H) 2017 11:13 AM    GLUCPOC 186 (H) 2017 07:56 AM    GLUCPOC 194 (H) 2017 09:11 PM        Hospital (inpatient) medications:  1. Correction Scale: Lispro (Humalog) Insulin Resistant Sensitivity scale to cover for glucose > 139 mg/dL before meals and for glucose >199 at bedtime      Prior to admission medications: per past medical records  -Humalog - sliding scale - 3 times a day ~ 60 units each time     A1C:   Lab Results   Component Value Date/Time    Hemoglobin A1c 6.6 2017 03:30 AM    Hemoglobin A1c 5.2 2014 04:00 AM     Lab Results   Component Value Date/Time    Creatinine 3.32 2017 05:30 AM     Active Orders   Diet    DIET CARDIAC Regular        Thank you. Maribell Asencio. Arely Castle, JUAN PABLON, MPH  Diabetes [address] [place] [address]  [phone]    -For most hospitalized persons with hyperglycemia in the intensive care unit (ICU), a glucose range of 140 to 180 mg/dL is recommended, provided this target can be safely achieved. *  - For general medicine and surgery patients in non-ICU settings, a premeal glucose target <140 mg/dL and a random blood glucose <180 mg/dL are recommended. DIEGO Redmond, Shraddha Mathews., ... & Kelsey More ([id number]). AMERICAN ASSOCIATION OF CLINICAL ENDOCRINOLOGISTS AND AMERICAN COLLEGE OF ENDOCRINOLOGY-CLINICAL PRACTICE GUIDELINES FOR DEVELOPING A DIABETES MELLITUS COMPREHENSIVE CARE PLAN-2015-EXECUTIVE SUMMARY: Complete guidelines are available at https://www. aace. com/publications/guidelines. Endocrine Practice, 21(4), [id number].

## 2017-02-20 NOTE — ROUTINE PROCESS
Bedside and Verbal shift change report given to P[place]al [place] (oncoming nurse) by Char Wild (offgoing nurse). Report included the following information SBAR, Kardex, ED Summary, Intake/Output, MAR, Recent Results and Cardiac Rhythm nsr.    8280 order noted for fleet enema and  MOM po    1045 family practice at bedside, notified that pt refusing SCDs, stated that pt needs to wear stockings    1055 PT at bedside    1142 order noted for zaroxolyn 5 mg once @ 1700, start TID hydralazine 25 mg, norvasc dcd    1322 pt is refusing phlebotomy sticks, will only allow blood draw off IV. Dr Jerzy Lou from [facility] notified, ok with IV draw. Order noted to dc IV bumex, start po    1351 Dr Jerzy Lou notified that pt daily PT/INR lab has fallen off of [facility]. Order noted for one time PT/INR lab. Unable to draw specimen from IV, will have to stick. Phlebotomy notified. 1 Dr Erin Longo notified that PT/INR has been sent, pt has not had a BM since am bowel regimen and enema. She will place new orders so that daily INR will be drawn    1522 order for zaroxolyn 5 mg tab 1600    1550 per Dr Erin Longo, after pt has 1600 meds she can be dcd, she will round on pt    [phone] order for dc noted    1631 Per Dr Maritza Sifuentes, Dr [employer] ok for pt to be dcd on current bumex dose. [facility] from cardiac rehab at bedside    1637 order for coumadin 2 mg 1800. Maddie notified to reprint dc orders. Pt refusing insulin, will eat after dc.

## 2017-02-20 NOTE — PROGRESS NOTES
Discharge instructions, including information on new medications, were reviewed with patient. All questions were answered. IV and heart monitor were removed. Patient received a copy of her discharge instructions. Her prescriptions were sent to her pharmacy. Patient will be discharged home with her .

## 2017-02-20 NOTE — DISCHARGE SUMMARY
[id number] PROGRAM   Discharge Note    Date: February 20, 2017    Mars Callahan  MRN: [id number]  YOB: 1955  Age: 63 y.o. Date of admission: 2/16/2017     Date of discharge/transfer: 2/20/2017    Primary Care Physician: None     Attending physician at admission: Pankaj May DO      Attending physician at discharge/transfer: Manolo Thomas MD     Resident physician at discharge/transfer: Donn Young MD     Consultants during hospitalization  Nephrology-Dr. Marcio Chinchilla, Dr. Tosha Krueger   Cardiology-Dr. Higuera     Admission diagnoses   CHF exacerbation [facility])    Discharge diagnoses  Hospital Problems  Date Reviewed: 2/17/2017          Codes Class Noted POA    * (Principal)CHF exacerbation ([facility] ICD-10-CM: I50.9  ICD-9-CM: 428.0  2/16/2017 Unknown        CKD (chronic kidney disease) stage 4, GFR 15-29 ml/min (HCC) (Chronic) ICD-10-CM: N18.4  ICD-9-CM: 585.4  2/10/2017 Yes        DM (diabetes mellitus), type 2 with renal complications ([facility] (Chronic) ICD-10-CM: E11.29  ICD-9-CM: 250.40  8/9/2013 Yes        HTN (hypertension) (Chronic) ICD-10-CM: I10  ICD-9-CM: 401.9  10/1/2012 Yes        Morbid obesity (Chronic) ICD-10-CM: E66.01  ICD-9-CM: 278.01  10/1/2012 Yes        Esophageal reflux ICD-10-CM: K21.9  ICD-9-CM: 530.81  10/1/2012 Yes               History of Present Illness  Mars Callahan is a 63 y.o. female with a PMH of HFpEF, HTN, CAD s/p stent, DVT, CKD stage 4, morbid obesity, ILD, GERD, IDDM who presents to the ER complaining of SOB, weight gain, and chest tightness. She states that she has felt SOB since discharge on 2/10, but that it has been getting progressively worse. She is on 2L NC baseline at home. Since discharge she has been tapering her prednisone (today is her last day at 10mg) and using her albuterol nebs 1xday. She denies any fevers, although she has had chills and a productive cough.  She notes that she has gained 28lbs since being discharged despite taking her Bumex as prescribed. She has baseline orthopnea requiring 4 pillows to sleep, which is unchanged. She notes that she has substernal chest pressure that is aggravated with exertion and relieved with rest, and resolved after taking nitroglycerin twice in the past week. She states that she currently has the same pressure, and that it does not radiate.      In the ER, vital signs were remarkable for BP of 182/80, RR 24 on 2L NC. Labs were remarkable for WBC of 14.0, Hg 8.5 (baseline ~9), Cr of 3.15 (baseline ~3.5), INR 6.1. EKG NSR. CXR showed stable opacities in bilateral LL and RUL. No treatment given in ER. Hospital course  CHF Exacerbation: Chest tightness, SOB slightly improved. Diuresed well with Bumex 2mg IV q12hr which was transitioned to PO. Cardiology does not recommend any further ischemic w/u at this time. Currently almost back to baseline and is able to continue with diuresis at home. VSS and medically stable for discharge.   -Discharged with change in bumex: Bumex increased to 2mg BID (previously on 1mg BID  -Recommended follow-up with PCP for weight check and OP BMP   -Further medication adjustments per PCP.      Hx of COPD: likely contributing to CHF picture. Pt back on baseline 2L O2.   -Continue Duo-nebs  -Continue 2L home O2      Coagulopathy: INR initially supratherpeutic at 5.6. Improved after holding warfarin and 5mg vitamin k administration.   - Discontinue 4mg warfarin at home  - Started 2mg of warfarin and recommended close follow-up with PCP in 2 days for INR check      Hx of chronic DVT: recent hx (2/2/17) of RLE DVT. Repeat BLE doppler f/u showed DVT stable  - Started Warfarin 2mg daily, discontinued 4mg warfarin     HTN: Stable.  147/75 this AM   - Continue imdur, and bumex, coreg  - Discontinued norvasc due to RAMESH  - Started hydralazine 25mg TID  - Close follow-up with PCP for BP check and further medication adjustments     Constipation: Seems acute on chronic; small BM yesterday. Abdomen tender. CT abd shows moderate constipation. No evidence of bowel obstruction or perforation. S/P enema this afternoon.  -Continue miralax and dulcolax upon discharge     Anemia: Stable at 8.7 this AM. S/p 2 units PRBCs on 2/17. FOBT negative. - Monitor with OP CBC     CKD Stage 4: stable, Cr this AM 3.29 from 3.25. Baseline ~3.5. Nephrology followed along while inpatient.   -Caution with nephrotoxic drugs  -OP BMP while on bumex      IDDM: POC glucose 140s-190s. -Return to home insulin regimen. -F/U with PCP regarding further changes    Incidental L Renal Cyst - Noted on non-contrast abdomen/pelvis CT.   - Follow OP with Renal ultrasound.       CAD s/p stent: Plavix held during last visit. Per cardiology, no further ischemic workup.  -Continue ASA 81mg daily       GERD: Stable. -Continue protonix 40mg daily and Carafate 1g QID       Morbid Obesity: BMI 51.2. >20lb weight gain in 6 days.   -Encouraged weight loss      ACS Rule out: resolved. trop 0.05 -> 0.04. NSR on telemetry. -ASA 81mg daily       Physical exam at discharge:     General: No acute distress. Alert. Cooperative. On baseline 2L O2 NC. Head: Normocephalic. Atraumatic. Respiratory: Crackles improved bilaterally    Cardiovascular: RRR. Normal S1,S2. No m/r/g. Pulses 2+ throughout. GI: + bowel sounds. Abdomen TTP at upper abdomen. No rebound tenderness, Nondistended. Obese. Extremities: 2+ pitting edema bilaterally, + LE tenderness R>L, no cords.      Condition at discharge:  stable    Labs  Recent Labs      02/20/17   0530  02/19/17   0606  02/18/17   0458   WBC  9.1  9.8  11.7*   HGB  8.7*  9.1*  9.0*   HCT  28.6*  28.3*  28.1*   PLT  151  162  164     Recent Labs      02/20/17   0530  02/19/17   0606  02/18/17   0453   NA  140  140  143   K  4.6  4.6  4.1   CL  105  106  108   CO2  28  28  26   BUN  50*  51*  52*   CREA  3.32*  3.29*  3.25*   GLU  180*  192*  163*   CA  8.0*  8.1*  8.2*   MG  2.1 2.0  1.9   PHOS  3.1  3.3  4.0     Recent Labs      02/20/17   0530  02/19/17   0606  02/18/17   0453   SGOT  4*  9*  5*   ALT  18 19 18   AP  61  60  55   TBILI  0.4  0.4  0.4   TP  6.0*  5.9*  5.9*   ALB  2.8*  2.8*  2.7*   GLOB  3.2  3.1  3.2     Recent Labs      02/20/17   1422  02/19/17   1337  02/18/17   2331   INR  2.1*  1.9*  1.9*   PTP  21.7*  19.4*  19.0*      No results for input(s): FE, TIBC, PSAT, FERR in the last 72 hours. No results for input(s): PH, PCO2, PO2 in the last 72 hours. No results for input(s): CPK, CKMB in the last 72 hours. No lab exists for component: TROPONINI  Lab Results   Component Value Date/Time    Glucose (POC) 253 02/20/2017 11:13 AM    Glucose (POC) 186 02/20/2017 07:56 AM    Glucose (POC) 194 02/19/2017 09:11 PM    Glucose (POC) 176 02/19/2017 04:44 PM    Glucose (POC) 211 02/19/2017 11:39 AM    Glucose (POC) 170 07/21/2013 09:51 PM    Glucose (POC) 153 07/11/2013 11:00 PM    Glucose (POC) 105 05/23/2013 10:57 PM       Diagnostic studies  CT abd (2/19): Evidence of moderate constipation. No evidence of bowel obstruction or perforation. Procedures  BLE doppler (2/17): Stable right lower extremity DVT. Disposition: Home    Discharge/Transfer Medications  Current Discharge Medication List      CONTINUE these medications which have NOT CHANGED    Details   bumetanide (BUMEX) 1 mg tablet Take 1 Tab by mouth two (2) times a day. Qty: 60 Tab, Refills: 0    Associated Diagnoses: Chronic diastolic heart failure (HCC)      senna-docusate (PERICOLACE) 8.6-50 mg per tablet Take 2 Tabs by mouth daily. Qty: 28 Tab, Refills: 0      predniSONE (DELTASONE) 10 mg tablet Take 30mg for three days then 20mg for three days then 10mg for three days  Qty: 18 Tab, Refills: 0      warfarin (COUMADIN) 4 mg tablet Take 1 Tab by mouth daily. Qty: 15 Tab, Refills: 0      albuterol-ipratropium (DUONEB) 2.5 mg-0.5 mg/3 ml nebu 3 mL by Nebulization route three (3) times daily. sucralfate (CARAFATE) 1 gram tablet Take 1 g by mouth Before breakfast, lunch, dinner and at bedtime. fluticasone (FLONASE) 50 mcg/actuation nasal spray 2 Sprays by Both Nostrils route nightly. calcitRIOL (ROCALTROL) 0.25 mcg capsule Take 0.25 mcg by mouth two (2) days a week. Patient takes on Monday and Thursday      isosorbide mononitrate ER (IMDUR) 120 mg CR tablet TAKE 1 TABLET BY MOUTH EVERY DAY  Qty: 30 Tab, Refills: 5      carvedilol (COREG) 25 mg tablet TAKE 1 TABLET BY MOUTH TWICE A DAY  Qty: 180 Tab, Refills: 3      pantoprazole (PROTONIX) 40 mg tablet Take 1 Tab by mouth daily. Indications: GASTROESOPHAGEAL REFLUX  Qty: 90 Tab, Refills: 1      nitroglycerin (NITROSTAT) 0.3 mg SL tablet 1 Tab by SubLINGual route every five (5) minutes as needed for Chest Pain. Qty: 1 Bottle, Refills: 1    Associated Diagnoses: Atherosclerosis of native coronary artery of native heart with stable angina pectoris ([facility] Angina, class III ([facility])      INSULIN LISPRO (HUMALOG SC) by SubCUTAneous route Before breakfast, lunch, and dinner. Sliding scale, stated usually using around 60units three times daily      amLODIPine (NORVASC) 10 mg tablet Take 10 mg by mouth daily. ergocalciferol (VITAMIN D2) 50,000 unit capsule Take 50,000 Units by mouth every Tuesday and Thursday. aspirin 81 mg tablet Take 81 mg by mouth daily. atorvastatin (LIPITOR) 80 mg tablet Take 80 mg by mouth every evening. ondansetron (ZOFRAN ODT) 4 mg disintegrating tablet Take 1 Tab by mouth every six (6) hours as needed for Nausea. Qty: 30 Tab, Refills: 0      polyethylene glycol (MIRALAX) 17 gram packet Take 1 Packet by mouth daily. Qty: 14 Packet, Refills: 0      mupirocin (BACTROBAN) 2 % ointment Apply  to affected area two (2) times daily as needed (lesions on feet when needed).  Ointment external two times daily to lesions on feet until healed - now using as needed      albuterol (PROVENTIL HFA, VENTOLIN HFA, PROAIR HFA) 90 mcg/actuation inhaler Take 2 Puffs by inhalation every four (4) hours as needed for Wheezing. oxyCODONE-acetaminophen (PERCOCET 7.5) 7.5-325 mg per tablet Take 1 Tab by mouth every four (4) hours as needed. Refills: 0         STOP taking these medications       linaclotide (LINZESS) 290 mcg cap capsule Comments:   Reason for Stopping:               Recommended follow-up tests after discharge  · INR, CBC, BMP      Diet:  Low fat diet    Activity:  As tolerated     Discharge instructions to patient/family  Please seek medical attention for any new or worsening symptoms particularly fever, chest pain, shortness of breath, abdominal pain, nausea, vomiting    Follow up plans/appointments  Follow-up Information     None             Kojo Calzada MD  Family Medicine Resident    Cc: None     I saw and evaluated the patient, performing the key elements of the service. I discussed the findings, assessment and plan with the resident and agree with the resident's findings and plan as documented in the resident's note.

## 2017-02-21 ENCOUNTER — PATIENT OUTREACH (OUTPATIENT)
Dept: FAMILY MEDICINE CLINIC | Age: 62
End: 2017-02-21

## 2017-02-21 ENCOUNTER — PATIENT OUTREACH (OUTPATIENT)
Dept: CARDIOLOGY CLINIC | Age: 62
End: 2017-02-21

## 2017-02-21 LAB
ABO + RH BLD: NORMAL
BLD PROD TYP BPU: NORMAL
BLOOD GROUP ANTIBODIES SERPL: NORMAL
BPU ID: NORMAL
CROSSMATCH RESULT,%XM: NORMAL
SPECIMEN EXP DATE BLD: NORMAL
STATUS OF UNIT,%ST: NORMAL
UNIT DIVISION, %UDIV: 0

## 2017-02-21 NOTE — Clinical Note
Dr Rikki Garcia, She is still so very short of breath and discouraged with recent hospitalization---we have a lot to follow-up on, she is scheduled to see you, 2/23 at 2pm---I tried to bold most important, INR,BMP, CBC, may need GI referral---please see my note and let me know if you have further questions/need further information.  Thanks, Kinsey Moreno

## 2017-02-21 NOTE — PROGRESS NOTES
NNTOCIP  Patient hospitalized at [facility], 2/16/17 - 2/20/17 related to CHF Exacerbation. S/P 2/2/17 - 2/10/17 at [facility] related to Resp. Failure/Interstitial Lung Disease, DVT, Diastolic HF, PULM HTN and CKD. RRAT Total Score    29   HIGH RISK FOR RE-ADMISSION. See Previous NN/Patient Outreach Note:  2/16/17, Patient in office to establish PCP with Dr Mihaela Nogueira, reports recent [facility] hospitalization and ED visit. Dr Mihaela Nogueira contacted this writer/NN with concerns of 30 pound weight gain since discharge, INR at 5. 4(recent DVT on coumadin), Diabetes, CHF,and CKD, recommends patient return to [facility] for further medical evaluation. This writer/NN met with patient and  to discuss NN role and offer further assistance with coordination of care. Patient verbalizes understanding, agrees to return to [facility], prefers  to drive at this time/not wanting ambulance transport at this time. 2/21/17, This writer/NN contacted patient at listed phone number, HIPAA verified with 2 identifiers. Patient allowed this writer/NN to explain purpose of call, post hospitalization and upcoming appt with Dr Mihaela Nogueira, 2/23/17 at 2pm. Patient verbalizes understanding, however voices concerns of discharge with continued shortness of breath, no PULM or GI Consults during admission. Instruction provided on importance of monitoring symptoms of worsening CHF/SOB and reviewed importance of limiting sodium, current fluid restriction(1500ml/day), daily weights and medication compliance. Patient verbalizes good understanding of medications and discharge instructions. Patient states has only lost 7 of 28 pounds that were recently gained. Offered patient sooner office appt, patient prefers to keep 2/23/17 appt and agrees to call Dr Mihaela Nogueira with worsening symptoms and seek medical attention as needed. Patient states continued chest pressure and GERD symptoms with no relief with current treatment, Continue protonix 40mg daily and Carafate 1g QID.  Patient states has only seen GI once in past and if unable to get F/U appt, will discuss further with Dr Erika Lorenzo on 2/23/17. Instruction provided on importance of Advance Care Planning, patient states has discussed wishes with family and wants to be FULL CODE at this time. Patient agrees to continue to discuss wishes, does not have Living Will at this time. Patient spoke with this writer/NN for 15minutes, allowed to return to rest and patient agrees to allow this writer/NN to continue to follow. 2/21/17 Per Chart Review/Discharge Summary:  Hospital course  CHF Exacerbation: Chest tightness, SOB slightly improved. Diuresed well with Bumex 2mg IV q12hr which was transitioned to PO. Cardiology does not recommend any further ischemic w/u at this time. Currently almost back to baseline and is able to continue with diuresis at home. VSS and medically stable for discharge.   -Discharged with change in bumex: Bumex increased to 2mg BID (previously on 1mg BID  -Recommended follow-up with PCP for weight check and OP BMP     Hx of COPD: likely contributing to CHF picture. Pt back on baseline 2L O2. Continue Duo-nebs and 2L home O2       Coagulopathy: INR initially supratherpeutic at 5.6. Improved after holding warfarin and 5mg vitamin k administration.   - Discontinue 4mg warfarin at home  - Started 2mg of warfarin and recommended close follow-up with PCP in 2 days for INR check       Hx of chronic DVT: recent hx (2/2/17) of RLE DVT. Repeat BLE doppler f/u showed DVT stable(BLE doppler (2/17): Stable right lower extremity DVT)  - Started Warfarin 2mg daily, discontinued 4mg warfarin      HTN: Stable. 147/75 this AM   - Continue imdur, and bumex, coreg  - Discontinued norvasc due to RAMESH  - Started hydralazine 25mg TID  - Close follow-up with PCP for BP check and further medication adjustments      Constipation: Seems acute on chronic; small BM yesterday. Abdomen tender. CT abd shows moderate constipation.  No evidence of bowel obstruction or perforation. S/P enema this afternoon.  -Continue miralax and dulcolax upon discharge      Anemia: Stable at 8.7 this AM. S/p 2 units PRBCs on 2/17. FOBT negative. - Monitor with OP CBC      CKD Stage 4: stable, Cr this AM 3.29 from 3.25. Baseline ~3.5. Nephrology followed along while inpatient.   -Caution with nephrotoxic drugs  -OP BMP while on bumex       IDDM: POC glucose 140s-190s. -Return to home insulin regimen. -F/U with PCP regarding further changes     Incidental L Renal Cyst - Noted on non-contrast abdomen/pelvis CT.   - Follow OP with Renal ultrasound.        CAD s/p stent: Plavix held during last visit. Per cardiology, no further ischemic workup.  -Continue ASA 81mg daily       GERD: Stable. -Continue protonix 40mg daily and Carafate 1g QID       Morbid Obesity: BMI 51.2. >20lb weight gain in 6 days.   -Encouraged weight loss       ACS Rule out: resolved. trop 0.05 -> 0.04. NSR on telemetry.    -ASA 81mg daily

## 2017-02-21 NOTE — PROGRESS NOTES
Received message from in-pt HF NN and PCP NN as well as phone call from PCP NN. Readmission to [facility] on 2/16-2/10/17 from PCP office. For Acute DHF 2/2 COPD exacerbation with CKD 4 and anemia- s/p 1 unit PRBC's. [facility] with [facility]- she did attend post hospital visit on 2/14- she was scheduled for follow up in April- asked for them to escalate appointment as she has just been readmitted and released from Hospital on 2/20- she secured appointment with new Pulmonary MD- Dr. Mady Brush  (Dr. Teo Menjivar has retired) for Tuesday 2/28 at 10:30. Spoke with Ms. Didi Gopal- she is thankful for the above appointment and will attend. She states that she is breathing \"okay\"- easily fatigues and has LARA with min activity. She has finished steroids and is using her NEB TID on schedule. She has a new daily inhaler that she is using in the morning at 11 am-  STIOLTO RESPIMAT- added to medication list.   She is on a 1500 ml fluid restriction- encouraged her to use things like ice chips, hard candy, etc to help with hydration. Discussed proper nutrition to allow nutrition to continue to recover with COPD. Allow for good nutritional snacks- in between meals that will provider her with energy to recover. Following recommendations for glucose control food choices. Discussion about NA in diet- she has eliminated salt and is reading labels- asked her to have <300 mg per meal, <100 mg for snack and staying under 1500 mg per day- for now. She is monitoring her weight and BP- asked her to take BID before coreg doses. Provided the following parameters for her to reach out to me:   SBP > <145- consistently. HR <90. Signs of steady weight gain- continued or worsening chest symptoms- tightness, LARA, etc.   Discussion with explanation about cardiac conservation during the recovery period:  Space out activities and accept help-assistance with many.   Encouraged her to stay active- up and moving to reduce DVT recurrence- q2-3 h during waking hours. Provided my contact information for her to reach out as needed. She will see NP in office in 2 weeks. She will have seen PCP and Pulmonary provider(s). Asked her to keep me updated on what pulmonary is saying.

## 2017-02-23 ENCOUNTER — OFFICE VISIT (OUTPATIENT)
Dept: FAMILY MEDICINE CLINIC | Age: 62
End: 2017-02-23

## 2017-02-23 VITALS
TEMPERATURE: 98.2 F | SYSTOLIC BLOOD PRESSURE: 168 MMHG | WEIGHT: 293 LBS | OXYGEN SATURATION: 96 % | DIASTOLIC BLOOD PRESSURE: 70 MMHG | BODY MASS INDEX: 41.95 KG/M2 | RESPIRATION RATE: 18 BRPM | HEART RATE: 64 BPM | HEIGHT: 70 IN

## 2017-02-23 DIAGNOSIS — J43.2 CENTRILOBULAR EMPHYSEMA (HCC): Primary | ICD-10-CM

## 2017-02-23 DIAGNOSIS — I27.20 PULMONARY HTN (HCC): Chronic | ICD-10-CM

## 2017-02-23 DIAGNOSIS — N18.3 TYPE 2 DIABETES MELLITUS WITH STAGE 3 CHRONIC KIDNEY DISEASE, UNSPECIFIED LONG TERM INSULIN USE STATUS: Chronic | ICD-10-CM

## 2017-02-23 DIAGNOSIS — E11.22 TYPE 2 DIABETES MELLITUS WITH STAGE 3 CHRONIC KIDNEY DISEASE, UNSPECIFIED LONG TERM INSULIN USE STATUS: Chronic | ICD-10-CM

## 2017-02-23 DIAGNOSIS — I82.4Y9 DEEP VEIN THROMBOSIS (DVT) OF PROXIMAL LOWER EXTREMITY, UNSPECIFIED CHRONICITY, UNSPECIFIED LATERALITY (HCC): ICD-10-CM

## 2017-02-23 DIAGNOSIS — N18.4 CKD (CHRONIC KIDNEY DISEASE) STAGE 4, GFR 15-29 ML/MIN (HCC): Chronic | ICD-10-CM

## 2017-02-23 DIAGNOSIS — I50.32 CHRONIC DIASTOLIC HEART FAILURE (HCC): Chronic | ICD-10-CM

## 2017-02-23 LAB
INR BLD: 1.7
PT POC: 20.1 SEC
VALID INTERNAL CONTROL?: YES

## 2017-02-23 RX ORDER — WARFARIN 2 MG/1
TABLET ORAL
Qty: 35 TAB | Refills: 2 | Status: SHIPPED | OUTPATIENT
Start: 2017-02-23 | End: 2017-02-28

## 2017-02-23 RX ORDER — OXYCODONE AND ACETAMINOPHEN 5; 325 MG/1; MG/1
1 TABLET ORAL
Qty: 90 TAB | Refills: 0 | Status: SHIPPED | OUTPATIENT
Start: 2017-02-23 | End: 2017-05-10 | Stop reason: SDUPTHER

## 2017-02-23 NOTE — PATIENT INSTRUCTIONS
Chronic Obstructive Pulmonary Disease (COPD): Care Instructions  Your Care Instructions    Chronic obstructive pulmonary disease (COPD) is a general term for a group of lung diseases, including emphysema and chronic bronchitis. People with COPD have decreased airflow in and out of the lungs, which makes it hard to breathe. The airways also can get clogged with thick mucus. Cigarette smoking is a major cause of COPD. Although there is no cure for COPD, you can slow its progress. Following your treatment plan and taking care of yourself can help you feel better and live longer. Follow-up care is a key part of your treatment and safety. Be sure to make and go to all appointments, and call your doctor if you are having problems. It's also a good idea to know your test results and keep a list of the medicines you take. How can you care for yourself at home? Staying healthy  · Do not smoke. This is the most important step you can take to prevent more damage to your lungs. If you need help quitting, talk to your doctor about stop-smoking programs and medicines. These can increase your chances of quitting for good. · Avoid colds and flu. Get a pneumococcal vaccine shot. If you have had one before, ask your doctor whether you need a second dose. Get the flu vaccine every fall. If you must be around people with colds or the flu, wash your hands often. · Avoid secondhand smoke, air pollution, and high altitudes. Also avoid cold, dry air and hot, humid air. Stay at home with your windows closed when air pollution is bad. Medicines and oxygen therapy  · Take your medicines exactly as prescribed. Call your doctor if you think you are having a problem with your medicine. · You may be taking medicines such as:  ¨ Bronchodilators. These help open your airways and make breathing easier. Bronchodilators are either short-acting (work for 6 to 9 hours) or long-acting (work for 24 hours).  You inhale most bronchodilators, so they start to act quickly. Always carry your quick-relief inhaler with you in case you need it while you are away from home. ¨ Corticosteroids (prednisone, budesonide). These reduce airway inflammation. They come in pill or inhaled form. You must take these medicines every day for them to work well. · A spacer may help you get more inhaled medicine to your lungs. Ask your doctor or pharmacist if a spacer is right for you. If it is, ask how to use it properly. · Do not take any vitamins, over-the-counter medicine, or herbal products without talking to your doctor first.  · If your doctor prescribed antibiotics, take them as directed. Do not stop taking them just because you feel better. You need to take the full course of antibiotics. · Oxygen therapy boosts the amount of oxygen in your blood and helps you breathe easier. Use the flow rate your doctor has recommended, and do not change it without talking to your doctor first.  Activity  · Get regular exercise. Walking is an easy way to get exercise. Start out slowly, and walk a little more each day. · Pay attention to your breathing. You are exercising too hard if you cannot talk while you are exercising. · Take short rest breaks when doing household chores and other activities. · Learn breathing methodssuch as breathing through pursed lipsto help you become less short of breath. · If your doctor has not set you up with a pulmonary rehabilitation program, talk to him or her about whether rehab is right for you. Rehab includes exercise programs, education about your disease and how to manage it, help with diet and other changes, and emotional support. Diet  · Eat regular, healthy meals. Use bronchodilators about 1 hour before you eat to make it easier to eat. Eat several small meals instead of three large ones. Drink beverages at the end of the meal. Avoid foods that are hard to chew. · Eat foods that contain protein so that you do not lose muscle mass.   Mental health  · Talk to your family, friends, or a therapist about your feelings. It is normal to feel frightened, angry, hopeless, helpless, and even guilty. Talking openly about bad feelings can help you cope. If these feelings last, talk to your doctor. When should you call for help? Call 911 anytime you think you may need emergency care. For example, call if:  · You have severe trouble breathing. Call your doctor now or seek immediate medical care if:  · You have new or worse trouble breathing. · You cough up blood. · You have a fever. Watch closely for changes in your health, and be sure to contact your doctor if:  · You cough more deeply or more often, especially if you notice more mucus or a change in the color of your mucus. · You have new or worse swelling in your legs or belly. · You are not getting better as expected. Where can you learn more? Go to http://lindsay-kai.info/. Sharyle Cheng in the search box to learn more about \"Chronic Obstructive Pulmonary Disease (COPD): Care Instructions. \"  Current as of: May 23, 2016  Content Version: 11.1  © 8760-5191 [employer]. Care instructions adapted under license by [employer] (which disclaims liability or warranty for this information). If you have questions about a medical condition or this instruction, always ask your healthcare professional. [address] any warranty or liability for your use of this information.

## 2017-02-23 NOTE — PROGRESS NOTES
Kaela Bailey is a 63 y.o. female   Chief Complaint   Patient presents with   [facility] Follow Up    Medication Refill    pt here for hospital follow up and we have had an in depth discussion regarding her COPD, CHF, DVT and her pulmonary htn. Michel sharmacharissacullen have pt increase stiolto to 2 puffs daily and will consider add on ICS if not improving breathing. Pt also reports that she is having a lot of pain in her leg from her DVt and her pinched nerve in her back and was prev using percocet prn and states was helping and used rarely. Pt states that she had not been taking her pain pills and states she threw them away.  checked    Pt would also like to see endo for her DM although this has been relatively well controlled currently 6.6 a1c    I had an in depth conversation with her and her daughter regarding her multiple disease processes and how they are effecting her and how they affect each other. As well as long term management of these issues. she is a 63y.o. year old female who presents for evalution. Reviewed PmHx, RxHx, FmHx, SocHx, AllgHx and updated and dated in the chart. Review of Systems - negative except as listed above in the HPI    Objective:     Vitals:    02/23/17 1405   BP: 168/70   Pulse: 64   Resp: 18   Temp: 98.2 °F (36.8 °C)   TempSrc: Oral   SpO2: 96%   Weight: 335 lb 9.6 oz (152.2 kg)   Height: 5' 10\" (1.778 m)       Current Outpatient Prescriptions   Medication Sig    tiotropium-olodaterol (STIOLTO RESPIMAT) 2.5-2.5 mcg/actuation mist Take 2 Puffs by inhalation daily. Uses daily at 11 am    oxyCODONE-acetaminophen (PERCOCET) 5-325 mg per tablet Take 1 Tab by mouth every eight (8) hours as needed for Pain. Max Daily Amount: 3 Tabs.  warfarin (COUMADIN) 2 mg tablet 4 mg PO Friday and Monday, 2 mg all other days    senna-docusate (PERICOLACE) 8.6-50 mg per tablet Take 2 Tabs by mouth daily.  polyethylene glycol (MIRALAX) 17 gram packet Take 1 Packet by mouth daily.  hydrALAZINE (APRESOLINE) 25 mg tablet Take 1 Tab by mouth three (3) times daily.  bumetanide (BUMEX) 2 mg tablet Take 1 Tab by mouth two (2) times a day.  linaclotide (LINZESS) 290 mcg cap capsule Take 1 Cap by mouth Daily (before breakfast) for 10 days.  ondansetron (ZOFRAN ODT) 4 mg disintegrating tablet Take 1 Tab by mouth every six (6) hours as needed for Nausea.  polyethylene glycol (MIRALAX) 17 gram packet Take 1 Packet by mouth daily. (Patient taking differently: Take 17 g by mouth daily as needed.)    senna-docusate (PERICOLACE) 8.6-50 mg per tablet Take 2 Tabs by mouth daily.  albuterol-ipratropium (DUONEB) 2.5 mg-0.5 mg/3 ml nebu 3 mL by Nebulization route three (3) times daily.  sucralfate (CARAFATE) 1 gram tablet Take 1 g by mouth Before breakfast, lunch, dinner and at bedtime.  fluticasone (FLONASE) 50 mcg/actuation nasal spray 2 Sprays by Both Nostrils route nightly.  mupirocin (BACTROBAN) 2 % ointment Apply  to affected area two (2) times daily as needed (lesions on feet when needed). Ointment external two times daily to lesions on feet until healed - now using as needed    albuterol (PROVENTIL HFA, VENTOLIN HFA, PROAIR HFA) 90 mcg/actuation inhaler Take 2 Puffs by inhalation every four (4) hours as needed for Wheezing.  calcitRIOL (ROCALTROL) 0.25 mcg capsule Take 0.25 mcg by mouth two (2) days a week. Patient takes on Monday and Thursday    isosorbide mononitrate ER (IMDUR) 120 mg CR tablet TAKE 1 TABLET BY MOUTH EVERY DAY    carvedilol (COREG) 25 mg tablet TAKE 1 TABLET BY MOUTH TWICE A DAY    pantoprazole (PROTONIX) 40 mg tablet Take 1 Tab by mouth daily. Indications: GASTROESOPHAGEAL REFLUX    nitroglycerin (NITROSTAT) 0.3 mg SL tablet 1 Tab by SubLINGual route every five (5) minutes as needed for Chest Pain.  INSULIN LISPRO (HUMALOG SC) by SubCUTAneous route Before breakfast, lunch, and dinner.  Sliding scale, stated usually using around 60units three times daily    ergocalciferol (VITAMIN D2) 50,000 unit capsule Take 50,000 Units by mouth every Tuesday and Thursday.  aspirin 81 mg tablet Take 81 mg by mouth daily.  atorvastatin (LIPITOR) 80 mg tablet Take 80 mg by mouth every evening. No current facility-administered medications for this visit. Physical Examination: General appearance - alert, well appearing, and in no distress  Eyes - pupils equal and reactive, extraocular eye movements intact  Chest - coarse b/l but no wheezing  Heart - normal rate, regular rhythm, normal S1, S2, no murmurs, rubs, clicks or gallops  Extremities - b/l calf ttp, no pitting edema has stockings on  Skin - normal coloration and turgor, no rashes, no suspicious skin lesions noted      Assessment/ Plan:   Elizabeth Calvo was seen today for hospital follow up and medication refill. Diagnoses and all orders for this visit:    Centrilobular emphysema ([facility] [address]  Increase stiolto to 2 puffs daily, consider ICS if breathing not improving  planning for pulm rehab  Pulmonary HTN ([facility] [address]  F/u pulm  Chronic diastolic heart failure ([facility] [address]  Discussed weight checks and managing fluids as well and low salt intake  Type 2 diabetes mellitus with stage 3 chronic kidney disease, unspecified long term insulin use status ([facility])  -     REFERRAL TO ENDOCRINOLOGY    CKD (chronic kidney disease) stage 4, GFR 15-29 ml/min ([facility])  Managed by nephro, f/u to discuss av fistula placement  Deep vein thrombosis (DVT) of proximal lower extremity, unspecified chronicity, unspecified laterality ([facility])  -     oxyCODONE-acetaminophen (PERCOCET) 5-325 mg per tablet; Take 1 Tab by mouth every eight (8) hours as needed for Pain. Max Daily Amount: 3 Tabs. -     warfarin (COUMADIN) 2 mg tablet; 4 mg PO Friday and Monday, 2 mg all other days  -     AMB POC PT/INR       Follow-up Disposition:  Return in about 1 month (around 3/23/2017), or if symptoms worsen or fail to improve.     I have discussed the diagnosis with the patient and the intended plan as seen in the above orders. The patient has received an after-visit summary and questions were answered concerning future plans. Pt conveyed understanding of plan. Medication Side Effects and Warnings were discussed with patient      Jason Levine DO   Over 50% of the 42 minutes face to face with Sherine Maloney consisted of counseling and/or discussing treatment plans in reference to her mulitple diseases.

## 2017-02-27 ENCOUNTER — PATIENT OUTREACH (OUTPATIENT)
Dept: FAMILY MEDICINE CLINIC | Age: 62
End: 2017-02-27

## 2017-02-27 NOTE — PROGRESS NOTES
See Previous NN Documentation/Patient Outreach Notes:  S/P [facility], 2/16/17 - 2/20/17 related to CHF Exacerbation and S/P 2/2/17 - 2/10/17 at [facility] related to Resp. Failure/Interstitial Lung Disease, DVT, Diastolic HF, PULM HTN and CKD. Per chart review, patient kept F/U appt with Dr Diana Patel, 2/23/17, next scheduled appt, 3/9/17. Patient also keeping F/U appts with PULM and Cardiology. 2/27/17, This writer/NN attempted to contact patient at listed phone number, left voicemail message with IFP/NN contact information and request for return call. PLAN:  Continue post hospitalization. Discuss further symptoms or concerns, medications/fluid restrictions/daily weights, and F/U appts. Provide instruction, goal work and resource connection as indicated.

## 2017-02-28 ENCOUNTER — APPOINTMENT (OUTPATIENT)
Dept: GENERAL RADIOLOGY | Age: 62
DRG: 190 | End: 2017-02-28
Attending: PHYSICIAN ASSISTANT
Payer: MEDICARE

## 2017-02-28 ENCOUNTER — PATIENT OUTREACH (OUTPATIENT)
Dept: CARDIOLOGY CLINIC | Age: 62
End: 2017-02-28

## 2017-02-28 ENCOUNTER — HOSPITAL ENCOUNTER (INPATIENT)
Age: 62
LOS: 10 days | Discharge: HOME | DRG: 190 | End: 2017-03-10
Attending: EMERGENCY MEDICINE | Admitting: INTERNAL MEDICINE
Payer: MEDICARE

## 2017-02-28 ENCOUNTER — APPOINTMENT (OUTPATIENT)
Dept: NUCLEAR MEDICINE | Age: 62
DRG: 190 | End: 2017-02-28
Attending: PHYSICIAN ASSISTANT
Payer: MEDICARE

## 2017-02-28 DIAGNOSIS — J18.9 HAP (HOSPITAL-ACQUIRED PNEUMONIA): Primary | ICD-10-CM

## 2017-02-28 DIAGNOSIS — R06.09 DYSPNEA ON EXERTION: ICD-10-CM
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
## 2017-02-28 DIAGNOSIS — R07.9 CHEST PAIN, UNSPECIFIED TYPE: ICD-10-CM

## 2017-02-28 LAB
ALBUMIN SERPL BCP-MCNC: 3.1 G/DL (ref 3.5–5)
ALBUMIN/GLOB SERPL: 0.8 {RATIO} (ref 1.1–2.2)
ALP SERPL-CCNC: 75 U/L (ref 45–117)
ALT SERPL-CCNC: 20 U/L (ref 12–78)
ANION GAP BLD CALC-SCNC: 8 MMOL/L (ref 5–15)
APTT PPP: 34.8 SEC (ref 22.1–32.5)
AST SERPL W P-5'-P-CCNC: 13 U/L (ref 15–37)
ATRIAL RATE: 87 BPM
BASOPHILS # BLD AUTO: 0 K/UL (ref 0–0.1)
BASOPHILS # BLD: 0 % (ref 0–1)
BILIRUB SERPL-MCNC: 0.4 MG/DL (ref 0.2–1)
BNP SERPL-MCNC: 164 PG/ML (ref 0–125)
BUN SERPL-MCNC: 38 MG/DL (ref 6–20)
BUN/CREAT SERPL: 10 (ref 12–20)
CALCIUM SERPL-MCNC: 8.9 MG/DL (ref 8.5–10.1)
CALCULATED P AXIS, ECG09: 32 DEGREES
CALCULATED R AXIS, ECG10: -11 DEGREES
CALCULATED T AXIS, ECG11: 59 DEGREES
CHLORIDE SERPL-SCNC: 106 MMOL/L (ref 97–108)
CK MB CFR SERPL CALC: NORMAL % (ref 0–2.5)
CK MB SERPL-MCNC: <1 NG/ML (ref 5–25)
CK SERPL-CCNC: 81 U/L (ref 26–192)
CO2 SERPL-SCNC: 27 MMOL/L (ref 21–32)
CREAT SERPL-MCNC: 3.89 MG/DL (ref 0.55–1.02)
DIAGNOSIS, 93000: NORMAL
EOSINOPHIL # BLD: 0.3 K/UL (ref 0–0.4)
EOSINOPHIL NFR BLD: 4 % (ref 0–7)
ERYTHROCYTE [DISTWIDTH] IN BLOOD BY AUTOMATED COUNT: 14.9 % (ref 11.5–14.5)
GLOBULIN SER CALC-MCNC: 3.8 G/DL (ref 2–4)
GLUCOSE BLD STRIP.AUTO-MCNC: 136 MG/DL (ref 65–100)
GLUCOSE BLD STRIP.AUTO-MCNC: 178 MG/DL (ref 65–100)
GLUCOSE SERPL-MCNC: 204 MG/DL (ref 65–100)
HCT VFR BLD AUTO: 30.2 % (ref 35–47)
HGB BLD-MCNC: 9.2 G/DL (ref 11.5–16)
INR PPP: 2.3 (ref 0.9–1.1)
LYMPHOCYTES # BLD AUTO: 27 % (ref 12–49)
LYMPHOCYTES # BLD: 1.8 K/UL (ref 0.8–3.5)
MCH RBC QN AUTO: 29.1 PG (ref 26–34)
MCHC RBC AUTO-ENTMCNC: 30.5 G/DL (ref 30–36.5)
MCV RBC AUTO: 95.6 FL (ref 80–99)
MONOCYTES # BLD: 0.5 K/UL (ref 0–1)
MONOCYTES NFR BLD AUTO: 8 % (ref 5–13)
NEUTS SEG # BLD: 4 K/UL (ref 1.8–8)
NEUTS SEG NFR BLD AUTO: 61 % (ref 32–75)
P-R INTERVAL, ECG05: 174 MS
PLATELET # BLD AUTO: 300 K/UL (ref 150–400)
POTASSIUM SERPL-SCNC: 4.5 MMOL/L (ref 3.5–5.1)
PROT SERPL-MCNC: 6.9 G/DL (ref 6.4–8.2)
PROTHROMBIN TIME: 23.8 SEC (ref 9–11.1)
Q-T INTERVAL, ECG07: 388 MS
QRS DURATION, ECG06: 74 MS
QTC CALCULATION (BEZET), ECG08: 466 MS
RBC # BLD AUTO: 3.16 M/UL (ref 3.8–5.2)
SERVICE CMNT-IMP: ABNORMAL
SERVICE CMNT-IMP: ABNORMAL
SODIUM SERPL-SCNC: 141 MMOL/L (ref 136–145)
THERAPEUTIC RANGE,PTTT: ABNORMAL SECS (ref 58–77)
TROPONIN I SERPL-MCNC: <0.04 NG/ML
VENTRICULAR RATE, ECG03: 87 BPM
WBC # BLD AUTO: 6.6 K/UL (ref 3.6–11)

## 2017-02-28 PROCEDURE — A9567 TECHNETIUM TC-99M AEROSOL: HCPCS

## 2017-02-28 PROCEDURE — 96361 HYDRATE IV INFUSION ADD-ON: CPT

## 2017-02-28 PROCEDURE — 71020 XR CHEST PA LAT: CPT

## 2017-02-28 PROCEDURE — 80053 COMPREHEN METABOLIC PANEL: CPT | Performed by: EMERGENCY MEDICINE

## 2017-02-28 PROCEDURE — 93005 ELECTROCARDIOGRAM TRACING: CPT

## 2017-02-28 PROCEDURE — 96365 THER/PROPH/DIAG IV INF INIT: CPT

## 2017-02-28 PROCEDURE — 74011250637 HC RX REV CODE- 250/637: Performed by: PHYSICIAN ASSISTANT

## 2017-02-28 PROCEDURE — 93970 EXTREMITY STUDY: CPT

## 2017-02-28 PROCEDURE — 77030013033 HC MSK BPAP/CPAP MMKA -B

## 2017-02-28 PROCEDURE — 82550 ASSAY OF CK (CPK): CPT | Performed by: PHYSICIAN ASSISTANT

## 2017-02-28 PROCEDURE — 74011250637 HC RX REV CODE- 250/637: Performed by: FAMILY MEDICINE

## 2017-02-28 PROCEDURE — 82962 GLUCOSE BLOOD TEST: CPT

## 2017-02-28 PROCEDURE — 77030013140 HC MSK NEB VYRM -A

## 2017-02-28 PROCEDURE — 85610 PROTHROMBIN TIME: CPT | Performed by: PHYSICIAN ASSISTANT

## 2017-02-28 PROCEDURE — 65660000000 HC RM CCU STEPDOWN

## 2017-02-28 PROCEDURE — 84484 ASSAY OF TROPONIN QUANT: CPT | Performed by: PHYSICIAN ASSISTANT

## 2017-02-28 PROCEDURE — 74011250636 HC RX REV CODE- 250/636: Performed by: PHYSICIAN ASSISTANT

## 2017-02-28 PROCEDURE — 85025 COMPLETE CBC W/AUTO DIFF WBC: CPT | Performed by: EMERGENCY MEDICINE

## 2017-02-28 PROCEDURE — 99285 EMERGENCY DEPT VISIT HI MDM: CPT

## 2017-02-28 PROCEDURE — 83880 ASSAY OF NATRIURETIC PEPTIDE: CPT | Performed by: EMERGENCY MEDICINE

## 2017-02-28 PROCEDURE — 74011250637 HC RX REV CODE- 250/637

## 2017-02-28 PROCEDURE — 36415 COLL VENOUS BLD VENIPUNCTURE: CPT | Performed by: PHYSICIAN ASSISTANT

## 2017-02-28 PROCEDURE — 87040 BLOOD CULTURE FOR BACTERIA: CPT | Performed by: PHYSICIAN ASSISTANT

## 2017-02-28 PROCEDURE — 74011000258 HC RX REV CODE- 258: Performed by: PHYSICIAN ASSISTANT

## 2017-02-28 PROCEDURE — 94761 N-INVAS EAR/PLS OXIMETRY MLT: CPT

## 2017-02-28 PROCEDURE — 85730 THROMBOPLASTIN TIME PARTIAL: CPT | Performed by: PHYSICIAN ASSISTANT

## 2017-02-28 RX ORDER — SODIUM CHLORIDE 0.9 % (FLUSH) 0.9 %
5-10 SYRINGE (ML) INJECTION AS NEEDED
Status: DISCONTINUED | OUTPATIENT
Start: 2017-02-28 | End: 2017-03-10 | Stop reason: HOSPADM

## 2017-02-28 RX ORDER — CARVEDILOL 12.5 MG/1
25 TABLET ORAL 2 TIMES DAILY WITH MEALS
Status: DISCONTINUED | OUTPATIENT
Start: 2017-02-28 | End: 2017-03-10 | Stop reason: HOSPADM

## 2017-02-28 RX ORDER — MAGNESIUM SULFATE 100 %
4 CRYSTALS MISCELLANEOUS AS NEEDED
Status: DISCONTINUED | OUTPATIENT
Start: 2017-02-28 | End: 2017-03-10 | Stop reason: HOSPADM

## 2017-02-28 RX ORDER — INSULIN ASPART 100 [IU]/ML
INJECTION, SUSPENSION SUBCUTANEOUS
Status: ON HOLD | COMMUNITY
End: 2019-01-18 | Stop reason: ALTCHOICE

## 2017-02-28 RX ORDER — SODIUM CHLORIDE 0.9 % (FLUSH) 0.9 %
5-10 SYRINGE (ML) INJECTION AS NEEDED
Status: DISCONTINUED | OUTPATIENT
Start: 2017-02-28 | End: 2017-03-06 | Stop reason: SDUPTHER

## 2017-02-28 RX ORDER — DEXTROSE 50 % IN WATER (D50W) INTRAVENOUS SYRINGE
12.5-25 AS NEEDED
Status: DISCONTINUED | OUTPATIENT
Start: 2017-02-28 | End: 2017-03-10 | Stop reason: HOSPADM

## 2017-02-28 RX ORDER — POLYETHYLENE GLYCOL 3350 17 G/17G
17 POWDER, FOR SOLUTION ORAL
COMMUNITY
End: 2017-09-15

## 2017-02-28 RX ORDER — SODIUM CHLORIDE 0.9 % (FLUSH) 0.9 %
5-10 SYRINGE (ML) INJECTION EVERY 8 HOURS
Status: DISCONTINUED | OUTPATIENT
Start: 2017-02-28 | End: 2017-03-10 | Stop reason: HOSPADM

## 2017-02-28 RX ORDER — ONDANSETRON 4 MG/1
4 TABLET, ORALLY DISINTEGRATING ORAL
Status: COMPLETED | OUTPATIENT
Start: 2017-02-28 | End: 2017-02-28

## 2017-02-28 RX ORDER — IPRATROPIUM BROMIDE AND ALBUTEROL SULFATE 2.5; .5 MG/3ML; MG/3ML
3 SOLUTION RESPIRATORY (INHALATION)
Status: DISCONTINUED | OUTPATIENT
Start: 2017-02-28 | End: 2017-03-01

## 2017-02-28 RX ORDER — OXYCODONE AND ACETAMINOPHEN 5; 325 MG/1; MG/1
1 TABLET ORAL
Status: COMPLETED | OUTPATIENT
Start: 2017-02-28 | End: 2017-02-28

## 2017-02-28 RX ORDER — BUMETANIDE 1 MG/1
2 TABLET ORAL 2 TIMES DAILY
Status: DISCONTINUED | OUTPATIENT
Start: 2017-02-28 | End: 2017-03-02

## 2017-02-28 RX ORDER — DOCUSATE SODIUM 100 MG/1
100 CAPSULE, LIQUID FILLED ORAL
Status: DISCONTINUED | OUTPATIENT
Start: 2017-02-28 | End: 2017-03-10 | Stop reason: HOSPADM

## 2017-02-28 RX ORDER — SUCRALFATE 1 G/1
1 TABLET ORAL
Status: DISCONTINUED | OUTPATIENT
Start: 2017-02-28 | End: 2017-03-10 | Stop reason: HOSPADM

## 2017-02-28 RX ORDER — WARFARIN 2 MG/1
2 TABLET ORAL ONCE
Status: DISCONTINUED | OUTPATIENT
Start: 2017-02-28 | End: 2017-02-28

## 2017-02-28 RX ORDER — GUAIFENESIN 100 MG/5ML
81 LIQUID (ML) ORAL DAILY
Status: DISCONTINUED | OUTPATIENT
Start: 2017-03-01 | End: 2017-03-10 | Stop reason: HOSPADM

## 2017-02-28 RX ORDER — ATORVASTATIN CALCIUM 20 MG/1
80 TABLET, FILM COATED ORAL EVERY EVENING
Status: DISCONTINUED | OUTPATIENT
Start: 2017-02-28 | End: 2017-03-10 | Stop reason: HOSPADM

## 2017-02-28 RX ORDER — ONDANSETRON 4 MG/1
TABLET, ORALLY DISINTEGRATING ORAL
Status: COMPLETED
Start: 2017-02-28 | End: 2017-02-28

## 2017-02-28 RX ORDER — HYDRALAZINE HYDROCHLORIDE 25 MG/1
25 TABLET, FILM COATED ORAL 3 TIMES DAILY
Status: DISCONTINUED | OUTPATIENT
Start: 2017-02-28 | End: 2017-03-10 | Stop reason: HOSPADM

## 2017-02-28 RX ORDER — INSULIN LISPRO 100 [IU]/ML
INJECTION, SOLUTION INTRAVENOUS; SUBCUTANEOUS
Status: DISCONTINUED | OUTPATIENT
Start: 2017-02-28 | End: 2017-03-10 | Stop reason: HOSPADM

## 2017-02-28 RX ORDER — WARFARIN 2 MG/1
2 TABLET ORAL
COMMUNITY
End: 2017-05-10

## 2017-02-28 RX ORDER — PANTOPRAZOLE SODIUM 40 MG/1
40 TABLET, DELAYED RELEASE ORAL
Status: DISCONTINUED | OUTPATIENT
Start: 2017-03-01 | End: 2017-03-10 | Stop reason: HOSPADM

## 2017-02-28 RX ORDER — SODIUM CHLORIDE 0.9 % (FLUSH) 0.9 %
5-10 SYRINGE (ML) INJECTION EVERY 8 HOURS
Status: DISCONTINUED | OUTPATIENT
Start: 2017-02-28 | End: 2017-03-06 | Stop reason: SDUPTHER

## 2017-02-28 RX ORDER — WARFARIN 2 MG/1
1 TABLET ORAL DAILY
COMMUNITY
End: 2017-05-10

## 2017-02-28 RX ORDER — DOCUSATE SODIUM 100 MG/1
100 CAPSULE, LIQUID FILLED ORAL
COMMUNITY
End: 2017-09-15

## 2017-02-28 RX ORDER — SENNOSIDES 8.6 MG/1
2 TABLET ORAL
COMMUNITY
End: 2017-09-15

## 2017-02-28 RX ADMIN — OXYCODONE HYDROCHLORIDE AND ACETAMINOPHEN 1 TABLET: 5; 325 TABLET ORAL at 16:34

## 2017-02-28 RX ADMIN — VANCOMYCIN HYDROCHLORIDE 2500 MG: 10 INJECTION, POWDER, LYOPHILIZED, FOR SOLUTION INTRAVENOUS at 17:10

## 2017-02-28 RX ADMIN — ONDANSETRON 4 MG: 4 TABLET, ORALLY DISINTEGRATING ORAL at 16:34

## 2017-02-28 RX ADMIN — ATORVASTATIN CALCIUM 80 MG: 20 TABLET, FILM COATED ORAL at 20:06

## 2017-02-28 RX ADMIN — SUCRALFATE 1 G: 1 TABLET ORAL at 21:50

## 2017-02-28 RX ADMIN — CEFEPIME HYDROCHLORIDE 2 G: 2 INJECTION, POWDER, FOR SOLUTION INTRAVENOUS at 16:36

## 2017-02-28 RX ADMIN — BUMETANIDE 2 MG: 1 TABLET ORAL at 20:07

## 2017-02-28 RX ADMIN — CARVEDILOL 25 MG: 12.5 TABLET, FILM COATED ORAL at 21:18

## 2017-02-28 RX ADMIN — DOCUSATE SODIUM 100 MG: 100 CAPSULE ORAL at 21:18

## 2017-02-28 RX ADMIN — HYDRALAZINE HYDROCHLORIDE 25 MG: 25 TABLET, FILM COATED ORAL at 21:18

## 2017-02-28 NOTE — H&P
[facility]   Admission H&P    Date of admission: 2/28/2017    Patient name: Shine Honeycutt  MRN: [id number]  YOB: 1955  Age: 63 y.o. Primary care provider:  Emelyn Hernandez DO     Source of Information: patient, medical records    Chief complaint:  Shortness of breath and chest pain     History of Present Illness  Shine Honeycutt is a 62 yo female PMH of HFpEF, HTN, CAD s/p stent, DVT (on coumadin), CKD4, COPD on 2LNC, GERD, IDDM admitted with HCAP. Pt presented from pulmonologists office with worsening shortness of breath x 2 days. She also has left sided substernal chest pain that radiates down her left arm and is complaining of orthopnea (acute on chronic). Pt was recently admitted for CHF exacerbation from 2/2-2/10, diuresed and discharged home with close follow-up with PCP. She followed-up with her PCP who referred her to endocrinology for her DM, added stiolto to her COPD regimen, increased warfarin to 4mg on Mondays and Fridays and the rest of the days she was supposed to take 2mg. Pt states that after discharge she was feeling better until 2 days ago and it became difficult for her to ambulate. She denies any fevers, chills, cough, nausea, vomiting, headache, rash, diarrhea, abdominal pain, urinary/bowel changes, sweating or weight loss. In the ER, vital signs were stable. Labs were remarkable for WBC 6.6, Cr 3.89, glucose 204, INR 2.3, pro-bnp was 164. BLE doppler prelim reading was negative for DVT. CXR showed some increased opacities bilaterally. Pt was treated with cefepime, vanc, zofran, percocet. Home Medications   Prior to Admission medications    Medication Sig Start Date End Date Taking? Authorizing Provider   polyethylene glycol (MIRALAX) 17 gram packet Take 17 g by mouth daily as needed. Yes Historical Provider   warfarin (COUMADIN) 2 mg tablet Take 4 mg by mouth two (2) days a week.  Monday and Friday   Yes Historical Provider   warfarin (COUMADIN) 2 mg tablet Take 2 mg by mouth five (5) days a week. Tues, Wed, Thurs, Sa, [place]   Yes Historical Provider   senna (SENNA) 8.6 mg tablet Take 2 Tabs by mouth nightly. Yes Historical Provider   docusate sodium (COLACE) 100 mg capsule Take 100 mg by mouth nightly. Yes Historical Provider   insulin aspart protamine/insulin aspart (NOVOLOG MIX 70-30) 100 unit/mL (70-30) injection by SubCUTAneous route. Uses sliding scale   Yes Historical Provider   tiotropium-olodaterol (STIOLTO RESPIMAT) 2.5-2.5 mcg/actuation mist Take 2 Puffs by inhalation Daily (before lunch). Uses daily at 11 am  2/23/17  Yes Hallie Levine DO   oxyCODONE-acetaminophen (PERCOCET) 5-325 mg per tablet Take 1 Tab by mouth every eight (8) hours as needed for Pain. Max Daily Amount: 3 Tabs. 2/23/17  Yes Hallie Levine DO   hydrALAZINE (APRESOLINE) 25 mg tablet Take 1 Tab by mouth three (3) times daily. 2/20/17  Yes Donn Young MD   bumetanide (BUMEX) 2 mg tablet Take 1 Tab by mouth two (2) times a day. 2/20/17  Yes Donn Young MD   linaclotide (LINZESS) 290 mcg cap capsule Take 1 Cap by mouth Daily (before breakfast) for 10 days. 2/20/17 3/2/17 Yes Kristel Burt MD   ondansetron (ZOFRAN ODT) 4 mg disintegrating tablet Take 1 Tab by mouth every six (6) hours as needed for Nausea. 2/11/17  Yes Charls Harada, MD   albuterol-ipratropium (DUONEB) 2.5 mg-0.5 mg/3 ml nebu 3 mL by Nebulization route daily as needed. Takes about 4 days a week   Yes Historical Provider   sucralfate (CARAFATE) 1 gram tablet Take 1 g by mouth Before breakfast, lunch, dinner and at bedtime. Yes Historical Provider   fluticasone (FLONASE) 50 mcg/actuation nasal spray 2 Sprays by Both Nostrils route nightly as needed. Yes Historical Provider   mupirocin (BACTROBAN) 2 % ointment Apply  to affected area two (2) times daily as needed (lesions on feet when needed).  Ointment external two times daily to lesions on feet until healed - now using as needed   Yes Historical Provider   albuterol (PROVENTIL HFA, VENTOLIN HFA, PROAIR HFA) 90 mcg/actuation inhaler Take 2 Puffs by inhalation every four (4) hours as needed for Wheezing. Yes Historical Provider   calcitRIOL (ROCALTROL) 0.25 mcg capsule Take 0.25 mcg by mouth two (2) days a week. Patient takes on Monday and Thursday   Yes Historical Provider   isosorbide mononitrate ER (IMDUR) 120 mg CR tablet TAKE 1 TABLET BY MOUTH EVERY DAY 11/21/16  Yes Joseph Gates MD   carvedilol (COREG) 25 mg tablet TAKE 1 TABLET BY MOUTH TWICE A DAY 10/16/16  Yes Joseph Gates MD   pantoprazole (PROTONIX) 40 mg tablet Take 1 Tab by mouth daily. Indications: GASTROESOPHAGEAL REFLUX 6/24/16  Yes Anabelle Gonzalez MD   nitroglycerin (NITROSTAT) 0.3 mg SL tablet 1 Tab by SubLINGual route every five (5) minutes as needed for Chest Pain. 6/24/16  Yes Joseph Gates MD   ergocalciferol (VITAMIN D2) 50,000 unit capsule Take 50,000 Units by mouth every Tuesday and Thursday. Yes Abhijit Frey MD   aspirin 81 mg tablet Take 81 mg by mouth daily. Yes Abhijit Frey MD   atorvastatin (LIPITOR) 80 mg tablet Take 80 mg by mouth every evening.      Yes Historical Provider       Allergies   Allergies   Allergen Reactions    Contrast Dye [Iodine] Anaphylaxis    Levaquin [Levofloxacin] Nausea and Vomiting    Morphine Hives and Itching       Past Medical History:   Diagnosis Date     Sleep Apnea 2/16/2011    Angina, class III ([facility] 8/9/2013    Aortic aneurysm ([facility]     CAD (coronary Artery Disease) Native Artery 2/16/2011    CKD (chronic kidney disease) stage 4, GFR 15-29 ml/min ([address] 2/10/2017    Diabetic gastroparesis ([address]     Diastolic heart failure ([facility] 10/5/2012    Esophageal stricture 2012    dialted Dr. Gerhardt Parry G6PD deficiency ([facility] [address]     trait    GERD (gastroesophageal reflux disease)     Hypertensive Cardiovasc Dis Benign, No CHF 2/16/2011    ILD (interstitial lung disease) ([facility] [address] [facility]     open lung bx CJW 2010  OA (osteoarthritis)     Obesity 2/16/2011    Ovarian cancer ([facility]     cervical and uterine    Rheumatoid arteritis (HCC)     T2DM (type 2 diabetes mellitus) ([facility] [facility] 8/9/2013    Tobacco use disorder 3/21/2012    Uterine cervix cancer (HCC)     Vitamin D deficiency 8/9/2013       Past Surgical History:   Procedure Laterality Date    CARDIAC SURG PROCEDURE UNLIST      stents    COLONOSCOPY N/A 6/24/2016    COLONOSCOPY performed by Alma Rosa Mckeon MD at [facility] HX APPENDECTOMY      HX CARPAL TUNNEL RELEASE      bilateral    HX CHOLECYSTECTOMY      HX HERNIA REPAIR      HX HYSTERECTOMY      HX ORTHOPAEDIC  11/12/12    right knee replacement    HX TONSIL AND ADENOIDECTOMY      UPPER GI ENDOSCOPY,DILATN W GUIDE  6/24/2016            Family History   Problem Relation Age of Onset    Heart Disease Mother     Heart Disease Brother      Social History   Patient resides    Independently    X With family care      Assisted living      SNF       Ambulates  X Independently      With cane      Assisted walker              Alcohol history   X None     Social     Chronic      Smoking history    None   X Former smoker- 2 packs a day for 44 years and quit 3 years ago     Current smoker           History   Smoking Status    Former Smoker    Packs/day: 0.50    Years: 41.00    Types: Cigarettes    Quit date: 11/9/2014   Smokeless Tobacco    Never Used               Drug history  X None     Former drug user     Current drug user         Code status    Full code   X DNR/DNI     Partial        Review of Systems  Constitutional: Denies fever, chills   Ears, Nose, Mouth, Throat, and Face: Denies cough, sore throat   Respiratory: + sob   Cardiovascular: + chest pain, +orthopnea   Gastrointestinal: denies abdominal pain, + chronic constipation  Genitourinary: denies dysuria  Neurological: denies headache, dizziness     Physical Exam  Visit Vitals    /79    Pulse 78    Temp 98.6 °F (37 °C)    Resp 18    Ht 5' 10\" (1.778 m)    Wt 327 lb (148.3 kg)    SpO2 98%    BMI 46.92 kg/m2      General: No acute distress. Alert. Cooperative. Comfortable on 2L NC. Head: Normocephalic. Atraumatic. Eyes:  Conjunctiva pink. Sclera white. Respiratory: Rhonchi bilaterally    Cardiovascular: RRR. Normal S1,S2. No m/r/g. Pulses 2+ throughout. No MSK pain on palpation of the chest wall. GI: + bowel sounds. Nontender. No rebound tenderness or guarding. Nondistended. Obese. Extremities: RLE more tender than the LLE. Trace edema. No palpable cord. No tenderness. Neuro: CN II-XII grossly intact. Strength 5/5 in all extremities. Sensation intact in all extremities. DTRs 2+ throughout. Laboratory Data  Recent Results (from the past 24 hour(s))   EKG, 12 LEAD, INITIAL    Collection Time: 02/28/17 12:37 PM   Result Value Ref Range    Ventricular Rate 87 BPM    Atrial Rate 87 BPM    P-R Interval 174 ms    QRS Duration 74 ms    Q-T Interval 388 ms    QTC Calculation (Bezet) 466 ms    Calculated P Axis 32 degrees    Calculated R Axis -11 degrees    Calculated T Axis 59 degrees    Diagnosis       Sinus rhythm with occasional premature ventricular complexes  Possible Left atrial enlargement  Borderline ECG  When compared with ECG of 17-FEB-2017 08:25,  premature ventricular complexes are now present  Vent. rate has increased BY  29 BPM  QT has lengthened     CBC WITH AUTOMATED DIFF    Collection Time: 02/28/17 12:55 PM   Result Value Ref Range    WBC 6.6 3.6 - 11.0 K/uL    RBC 3.16 (L) 3.80 - 5.20 M/uL    HGB 9.2 (L) 11.5 - 16.0 g/dL    HCT 30.2 (L) 35.0 - 47.0 %    MCV 95.6 80.0 - 99.0 FL    MCH 29.1 26.0 - 34.0 PG    MCHC 30.5 30.0 - 36.5 g/dL    RDW 14.9 (H) 11.5 - 14.5 %    PLATELET 170 450 - 307 K/uL    NEUTROPHILS 61 32 - 75 %    LYMPHOCYTES 27 12 - 49 %    MONOCYTES 8 5 - 13 %    EOSINOPHILS 4 0 - 7 %    BASOPHILS 0 0 - 1 %    ABS. NEUTROPHILS 4.0 1.8 - 8.0 K/UL    ABS. LYMPHOCYTES 1.8 0.8 - 3.5 K/UL    ABS. MONOCYTES 0.5 0.0 - 1.0 K/UL    ABS. EOSINOPHILS 0.3 0.0 - 0.4 K/UL    ABS. BASOPHILS 0.0 0.0 - 0.1 K/UL   METABOLIC PANEL, COMPREHENSIVE    Collection Time: 02/28/17 12:55 PM   Result Value Ref Range    Sodium 141 136 - 145 mmol/L    Potassium 4.5 3.5 - 5.1 mmol/L    Chloride 106 97 - 108 mmol/L    CO2 27 21 - 32 mmol/L    Anion gap 8 5 - 15 mmol/L    Glucose 204 (H) 65 - 100 mg/dL    BUN 38 (H) 6 - 20 MG/DL    Creatinine 3.89 (H) 0.55 - 1.02 MG/DL    BUN/Creatinine ratio 10 (L) 12 - 20      GFR est AA 14 (L) >60 ml/min/1.73m2    GFR est non-AA 12 (L) >60 ml/min/1.73m2    Calcium 8.9 8.5 - 10.1 MG/DL    Bilirubin, total 0.4 0.2 - 1.0 MG/DL    ALT (SGPT) 20 12 - 78 U/L    AST (SGOT) 13 (L) 15 - 37 U/L    Alk. phosphatase 75 45 - 117 U/L    Protein, total 6.9 6.4 - 8.2 g/dL    Albumin 3.1 (L) 3.5 - 5.0 g/dL    Globulin 3.8 2.0 - 4.0 g/dL    A-G Ratio 0.8 (L) 1.1 - 2.2     PRO-BNP    Collection Time: 02/28/17 12:55 PM   Result Value Ref Range    NT pro- (H) 0 - 125 PG/ML   PROTHROMBIN TIME + INR    Collection Time: 02/28/17 12:55 PM   Result Value Ref Range    INR 2.3 (H) 0.9 - 1.1      Prothrombin time 23.8 (H) 9.0 - 11.1 sec   PTT    Collection Time: 02/28/17 12:55 PM   Result Value Ref Range    aPTT 34.8 (H) 22.1 - 32.5 sec    aPTT, therapeutic range     58.0 - 77.0 SECS   TROPONIN I    Collection Time: 02/28/17 12:55 PM   Result Value Ref Range    Troponin-I, Qt. <0.04 <0.05 ng/mL   CK W/ CKMB & INDEX    Collection Time: 02/28/17 12:55 PM   Result Value Ref Range    CK 81 26 - 192 U/L    CK - MB <1.0 <3.6 NG/ML    CK-MB Index Cannot be calulated 0 - 2.5       Imaging  CXR: official read: Lungs are abnormal but the appearance has not changed when compared  1/25/2017. These findings suggest chronic inflammation or infection with areas  of scarring.      Per ER physician: Lungs appear worse with bilateral opacities     EKG:  Rate (approx.): 87; Axis: normal; DC interval: normal; QRS interval: normal ; ST/T wave: normal; Other findings: borderline ekg. In comparison to past EKG from 2/17/17, PVCs now present, ventricular rate increased, and QT lengthened    Assessment and Plan   Mir López is a 64 yo female PMH of HFpEF, HTN, CAD s/p stent, DVT, CKD4, COPD on 2LNC, GERD, IDDM admitted with HCAP. HCAP: Worsened bilateral opacities on CXR. Pt stable on baseline of 2LNC. Afebrile and no leukocytosis. QT prolongation noted on EKG so will avoid any exacerbating antibiotics. -Admit to telemetry  -Pharmacy to renally dose cefepime and vancomycin  -Consider pulmonology consult     Chest Pain: Troponin negative x1. EKG with QT prolongation. Pt has a hx of hiatal hernia and GERD which could be contributing to her pain. -Trend troponins q6hr x 2  -Continue imdur   -Avoid QT prolonging agents. Hx of DVT: Prelim doppler negative for DVT. Doppler during previous admission showed stable right LLE DVT. Pt on coumadin and INR therapeutic at 2.3 on admission.   -Continue with coumadin, pharmacy to dose   -F/U V/Q scan     Hx of COPD: Recently started on stiolto. -Continue Stiolto-pharmacy substitution allowed  -Continue duo-nebs  -Continue o2 supplementation as needed to maintain O2 sats >90%    HFpEF: Pro-BNP mildly elevated at 164. No signs of fluid overload on exam. EF from ECHO on 2/2/17 was 75% with G1DD. Pt recently here with a CHF exacerbation. RAMESH has improved but continues with orthopnea. -Continue coreg 25mg daily and bumex 2mg daily    -Daily weights and strict I&O    HTN: BP stable for the most part but some episodes of elevated BP. On imdur, coreg, hydralazine and bumex at home.   -Continue coreg 25mg daily and bumex 2mg daily, hydralazine 25mg daily, imdur 120mg daily   -Monitor BP closely      CKD Stage 4: Cr on admission 3.89. Baseline ~3.5.   -Continue to monitor Cr while on bumex     IDDM: Glucose elevated at 204 on admission. On SSI 70/30 at home.     -SSI for now  -POC glucose checks ACHS     CAD s/p stent: Initial troponin negative. -ASA 81mg daily   -F/U trop x 2     Constipation: Chronic problem.  -Continue Dulcolax      GERD: Stable. -Continue protonix 40mg daily  -Continue Carafate 1g QID     Hyperlipidemia: Stable  -Continue home Lovastatin 80mg daily      Morbid Obesity: BMI 51.2. >20lb weight gain in 6 days. -Monitor daily weights  -Encourage weight loss     FEN/GI - Cardic diet. Activity - Up ad harsha  DVT prophylaxis - Coumadin  GI prophylaxis -  None indicated  Disposition - Plan to be be determined     CODE STATUS:  FULL CODE        Patient discussed with Dr. Eri Mann attending physician.      [address] Kevin Eduardo MD  Family Medicine Resident

## 2017-02-28 NOTE — IP AVS SNAPSHOT
Current Discharge Medication List  
  
Take these medications at their scheduled times Dose & Instructions Dispensing Information Comments Morning Noon Evening Bedtime  
 aspirin 81 mg tablet Your next dose is: Today, Tomorrow Other:  ____________ Dose:  81 mg Take 81 mg by mouth daily. Refills:  0  
     
   
   
   
  
 atorvastatin 80 mg tablet Commonly known as:  LIPITOR Your next dose is: Today, Tomorrow Other:  ____________ Dose:  80 mg Take 80 mg by mouth every evening. Refills:  0  
     
   
   
   
  
 bumetanide 2 mg tablet Commonly known as:  Marcheta Mocha Your next dose is: Today, Tomorrow Other:  ____________ Dose:  2 mg Take 1 Tab by mouth two (2) times a day. Quantity:  60 Tab Refills:  0  
     
   
   
   
  
 calcitRIOL 0.25 mcg capsule Commonly known as:  ROCALTROL Your next dose is: Today, Tomorrow Other:  ____________ Dose:  0.25 mcg Take 0.25 mcg by mouth two (2) days a week. Patient takes on Monday and Thursday Refills:  0  
     
   
   
   
  
 CARAFATE 1 gram tablet Generic drug:  sucralfate Your next dose is: Today, Tomorrow Other:  ____________ Dose:  1 g Take 1 g by mouth Before breakfast, lunch, dinner and at bedtime. Refills:  0  
     
   
   
   
  
 docusate sodium 100 mg capsule Commonly known as:  Lucetta Murillo Your next dose is: Today, Tomorrow Other:  ____________ Dose:  100 mg Take 100 mg by mouth nightly. Refills:  0  
     
   
   
   
  
 hydrALAZINE 25 mg tablet Commonly known as:  APRESOLINE Your next dose is: Today, Tomorrow Other:  ____________ Dose:  25 mg Take 1 Tab by mouth three (3) times daily. Quantity:  90 Tab Refills:  0  
     
   
   
   
  
 linaclotide 290 mcg Cap capsule Commonly known as:  Maryam Dozier  
   
 Your next dose is: Today, Tomorrow Other:  ____________ Dose:  290 mcg Take 1 Cap by mouth Daily (before breakfast) for 10 days. Quantity:  30 Cap Refills:  0  
     
   
   
   
  
 magnesium oxide 400 mg tablet Commonly known as:  MAG-OX Your next dose is: Today, Tomorrow Other:  ____________ Dose:  400 mg Take 1 Tab by mouth two (2) times a day. Quantity:  60 Tab Refills:  0  
     
   
   
   
  
 metOLazone 5 mg tablet Commonly known as:  Jodi Ripper Your next dose is: Today, Tomorrow Other:  ____________ Dose:  5 mg Take 1 Tab by mouth every Monday, Wednesday, Friday. Quantity:  15 Tab Refills:  0  
     
   
   
   
  
 pantoprazole 40 mg tablet Commonly known as:  PROTONIX Your next dose is: Today, Tomorrow Other:  ____________ Dose:  40 mg Take 1 Tab by mouth daily. Indications: GASTROESOPHAGEAL REFLUX Quantity:  90 Tab Refills:  1  
     
   
   
   
  
 predniSONE 10 mg tablet Commonly known as:  Austin Gun Your next dose is: Today, Tomorrow Other:  ____________ Dose:  10 mg Take 1 Tab by mouth daily for 3 days. Quantity:  3 Tab Refills:  0 Senna 8.6 mg tablet Generic drug:  senna Your next dose is: Today, Tomorrow Other:  ____________ Dose:  2 Tab Take 2 Tabs by mouth nightly. Refills:  0 STIOLTO RESPIMAT 2.5-2.5 mcg/actuation Mist  
Generic drug:  tiotropium-olodaterol Your next dose is: Today, Tomorrow Other:  ____________ Dose:  2 Puff Take 2 Puffs by inhalation Daily (before lunch). Uses daily at 11 am  
 Quantity:  1 Inhaler Refills:  1 VITAMIN D2 50,000 unit capsule Generic drug:  ergocalciferol Your next dose is: Today, Tomorrow Other:  ____________ Dose:  98238 Units Take 50,000 Units by mouth every Tuesday and Thursday. Refills:  0  
     
   
   
   
  
 * warfarin 2 mg tablet Commonly known as:  COUMADIN Your next dose is: Today, Tomorrow Other:  ____________ Dose:  4 mg Take 4 mg by mouth two (2) days a week. Monday and Friday Refills:  0  
     
   
   
   
  
 * warfarin 2 mg tablet Commonly known as:  COUMADIN Your next dose is: Today, Tomorrow Other:  ____________ Dose:  2 mg Take 2 mg by mouth five (5) days a week. Tues, Wed, Thurs, Sa, Su  
 Refills:  0  
     
   
   
   
  
 * Notice: This list has 2 medication(s) that are the same as other medications prescribed for you. Read the directions carefully, and ask your doctor or other care provider to review them with you. Take these medications as needed Dose & Instructions Dispensing Information Comments Morning Noon Evening Bedtime  
 albuterol 90 mcg/actuation inhaler Commonly known as:  PROVENTIL HFA, VENTOLIN HFA, PROAIR HFA Your next dose is: Today, Tomorrow Other:  ____________ Dose:  2 Puff Take 2 Puffs by inhalation every four (4) hours as needed for Wheezing. Refills:  0 BACTROBAN 2 % ointment Generic drug:  mupirocin Your next dose is: Today, Tomorrow Other:  ____________ Apply  to affected area two (2) times daily as needed (lesions on feet when needed). Ointment external two times daily to lesions on feet until healed - now using as needed Refills:  0 DUONEB 2.5 mg-0.5 mg/3 ml Nebu Generic drug:  albuterol-ipratropium Your next dose is: Today, Tomorrow Other:  ____________ Dose:  3 mL  
3 mL by Nebulization route daily as needed. Takes about 4 days a week Refills:  0  
     
   
   
   
  
 FLONASE 50 mcg/actuation nasal spray Generic drug:  fluticasone Your next dose is: Today, Tomorrow Other:  ____________ Dose:  2 Spray 2 Sprays by Both Nostrils route nightly as needed. Refills:  0 MIRALAX 17 gram packet Generic drug:  polyethylene glycol Your next dose is: Today, Tomorrow Other:  ____________ Dose:  17 g Take 17 g by mouth daily as needed. Refills:  0  
     
   
   
   
  
 nitroglycerin 0.3 mg SL tablet Commonly known as:  NITROSTAT Your next dose is: Today, Tomorrow Other:  ____________ Dose:  0.4 mg  
1 Tab by SubLINGual route every five (5) minutes as needed for Chest Pain. Quantity:  1 Bottle Refills:  1  
     
   
   
   
  
 ondansetron 4 mg disintegrating tablet Commonly known as:  ZOFRAN ODT Your next dose is: Today, Tomorrow Other:  ____________ Dose:  4 mg Take 1 Tab by mouth every six (6) hours as needed for Nausea. Quantity:  30 Tab Refills:  0  
     
   
   
   
  
 oxyCODONE-acetaminophen 5-325 mg per tablet Commonly known as:  PERCOCET Your next dose is: Today, Tomorrow Other:  ____________ Dose:  1 Tab Take 1 Tab by mouth every eight (8) hours as needed for Pain. Max Daily Amount: 3 Tabs. Quantity:  90 Tab Refills:  0 Take these medications as directed Dose & Instructions Dispensing Information Comments Morning Noon Evening Bedtime  
 carvedilol 25 mg tablet Commonly known as:  Vertessa Flynn Your next dose is: Today, Tomorrow Other:  ____________ TAKE 1 TABLET BY MOUTH TWICE A DAY Quantity:  180 Tab Refills:  3  
     
   
   
   
  
 isosorbide mononitrate  mg CR tablet Commonly known as:  IMDUR Your next dose is: Today, Tomorrow Other:  ____________ TAKE 1 TABLET BY MOUTH EVERY DAY Quantity:  30 Tab Refills:  5 NovoLOG Mix 70-30 100 unit/mL (70-30) injection Generic drug:  insulin aspart protamine/insulin aspart Your next dose is: Today, Tomorrow Other:  ____________  
   
   
 by SubCUTAneous route. Uses sliding scale Refills:  0 Where to Get Your Medications These medications were sent to [address], [address] [place] [address] Hours:  24-hours Phone:  [phone]  
  magnesium oxide 400 mg tablet Information about where to get these medications is not yet available ! Ask your nurse or doctor about these medications  
  linaclotide 290 mcg Cap capsule  
 metOLazone 5 mg tablet  
 predniSONE 10 mg tablet

## 2017-02-28 NOTE — ED TRIAGE NOTES
Pt having shortness of breath and right shoulder pain, she was sent by pulmonologist and told that she the problem was not due to her lungs but her heart.  Taken to room 22 for EKG

## 2017-02-28 NOTE — PROGRESS NOTES
Received phone call from Ms. Igor Cruz- she wanted to let me know that she was being sent from her pulmonary office today back to the ED at [facility] for her breathing. The pulmonary MD said it is not her lungs it is her heart that is causing the symptoms.      Alerted PCP NN, our hospital coordinator and the provider covering the hospital.

## 2017-02-28 NOTE — PROGRESS NOTES
[facility]: MED RECONCILIATION      Medications added:     · Insulin 70/30    Medications removed:    · Humalog- pt reported she uses 70/30 on sliding scale    Medications adjusted:    · Duo-neb    Information obtained from: Patient, RxQuery, recent medical records      Allergies: Contrast dye [iodine]; Levaquin [levofloxacin]; and Morphine    Prior to Admission Medications:     Medication Documentation Review Audit       Reviewed by Ramírez Jacobson. Rika Cline (Pharmacist) on 02/28/17 at [phone]         Medication Sig Documenting Provider Last Dose Status Taking? albuterol (PROVENTIL HFA, VENTOLIN HFA, PROAIR HFA) 90 mcg/actuation inhaler Take 2 Puffs by inhalation every four (4) hours as needed for Wheezing. Historical Provider  Active Yes    albuterol-ipratropium (DUONEB) 2.5 mg-0.5 mg/3 ml nebu 3 mL by Nebulization route daily as needed. Takes about 4 days a week Historical Provider 2/28/2017 AM Active Yes             Med Note (Erwin Bailey. Tue Feb 28, 2017  5:16 PM): Pt reports that does not help much. Only takes once a day about 4 days/week      aspirin 81 mg tablet Take 81 mg by mouth daily. Abhijit Frey MD 2/28/2017 AM Active Yes    atorvastatin (LIPITOR) 80 mg tablet Take 80 mg by mouth every evening. Historical Provider 2/27/2017 PM Active Yes    bumetanide (BUMEX) 2 mg tablet Take 1 Tab by mouth two (2) times a day. Mickey Espinoza MD 2/28/2017 AM Active Yes    calcitRIOL (ROCALTROL) 0.25 mcg capsule Take 0.25 mcg by mouth two (2) days a week. Patient takes on Monday and Thursday Historical Provider 2/27/2017 AM Active Yes    carvedilol (COREG) 25 mg tablet TAKE 1 TABLET BY MOUTH TWICE A DAY Genie Mckeon MD 2/28/2017 AM Active Yes    docusate sodium (COLACE) 100 mg capsule Take 100 mg by mouth nightly. Historical Provider 2/27/2017 PM Active Yes    ergocalciferol (VITAMIN D2) 50,000 unit capsule Take 50,000 Units by mouth every Tuesday and Thursday.  Abhijit Frey MD 2/28/2017 AM Active Yes Med Note Brandee Teedre Feb 2, 2017  5:13 PM): .      fluticasone (FLONASE) 50 mcg/actuation nasal spray 2 Sprays by Both Nostrils route nightly as needed. Historical Provider  Active Yes    hydrALAZINE (APRESOLINE) 25 mg tablet Take 1 Tab by mouth three (3) times daily. Trevin Costa MD 2/28/2017 AM Active Yes    isosorbide mononitrate ER (IMDUR) 120 mg CR tablet TAKE 1 TABLET BY MOUTH EVERY DAY Elvis Villarreal MD 2/28/2017 AM Active Yes    linaclotide (LINZESS) 290 mcg cap capsule Take 1 Cap by mouth Daily (before breakfast) for 10 days. Trevin Costa MD 2/28/2017 AM Active Yes    mupirocin (BACTROBAN) 2 % ointment Apply  to affected area two (2) times daily as needed (lesions on feet when needed). Ointment external two times daily to lesions on feet until healed - now using as needed Historical Provider  Active Yes    nitroglycerin (NITROSTAT) 0.3 mg SL tablet 1 Tab by SubLINGual route every five (5) minutes as needed for Chest Pain. Elvis Villarreal MD  Active Yes             Med Note (Surinder Carmichael Feb 28, 2017  5:08 PM):        ondansetron (ZOFRAN ODT) 4 mg disintegrating tablet Take 1 Tab by mouth every six (6) hours as needed for Nausea. Michael Lambert MD  Active Yes    oxyCODONE-acetaminophen (PERCOCET) 5-325 mg per tablet Take 1 Tab by mouth every eight (8) hours as needed for Pain. Max Daily Amount: 3 Tabs. Hallie Levine DO  Active Yes    pantoprazole (PROTONIX) 40 mg tablet Take 1 Tab by mouth daily. Indications: GASTROESOPHAGEAL REFLUX Mindi Gant MD 2/28/2017 AM Active Yes    polyethylene glycol (MIRALAX) 17 gram packet Take 17 g by mouth daily as needed. Historical Provider  Active Yes    senna (SENNA) 8.6 mg tablet Take 2 Tabs by mouth nightly. Historical Provider 2/27/2017 PM Active Yes    sucralfate (CARAFATE) 1 gram tablet Take 1 g by mouth Before breakfast, lunch, dinner and at bedtime.  Historical Provider 2/27/2017 PM Active Yes    tiotropium-olodaterol (Silvia Smith) 2.5-2.5 mcg/actuation mist Take 2 Puffs by inhalation Daily (before lunch). Uses daily at 11 am  Piper Bernal DO 2/28/2017 AM Active Yes    warfarin (COUMADIN) 2 mg tablet Take 4 mg by mouth two (2) days a week. Monday and Friday Historical Provider 2/27/2017 PM Active Yes    warfarin (COUMADIN) 2 mg tablet Take 2 mg by mouth five (5) days a week. Rito Aragon Sa, Vicky Historical Provider 2/26/2017 PM Active Yes                    Thank you,  Cari Hidalgo, Pharm. D.

## 2017-02-28 NOTE — ED PROVIDER NOTES
HPI Comments: Gayle Simons is a 63 y.o. female with hx significant for CAD, CABG x3, CHF, CKD stage4, DVT RLE dx 2/2/17, chronic anticoagulation with coumadin, 2L O2 via nasal cannula, and recent admission 2/2-2/10 for DVT RLE followed by subsequent admission 2/16-2/20 for CHF exacerbation who presents ambulatory to ER with c/o progressively worsening SOB and left sided chest discomfort x two days. Pt notes started with increased SOB x 2 nights ago that has been continuously worsening since onset. Notes SOB is significantly worse with exertion and states \"I can't even walk two feet now without getting so short of breath I have to stop\", which is new in past two days. Notes SOB resolves with rest and denies any SOB at rest. Notes SOB is also worse with lying down and states unable to lay flat due to SOB for past two nights. Notes associated intermittent L chest pain x SOB worsening. States CP comes and goes randomly and only lasts a few minutes when it comes on before resolving on it's own. Notes CP seems to worsen with exertion however notes CP is not exclusively with exertion and reports she does have CP at rest intermittently x onset two days ago. Also notes associated increased right leg pain and swelling over the past week since discharge from hospital on 2/20 and notes pain so significant the past few days \"I can barely walk on it it hurts so bad\". Also notes onset of left leg pain/swelling over past week with left calf pain. Notes coumadin level has never been therapeutic since dx of DVT earlier this month and states \"they couldn't tell if I had a blood clot in my lung bc I'm allergic to the dye needed for the test\". States INR last checked 4 days ago and was 1.7 at that time. States advised to increased coumadin to 4mg for one dose which she did and was due to return to recheck this week.    She specifically denies any fevers, chills, nausea, vomiting,  headache, rash, diarrhea, abdominal pain, urinary/bowel changes, sweating or weight loss. PCP: Lily Paez DO   PMHx significant for: Past Medical History:  2/16/2011:  Sleep Apnea  8/9/2013: Angina, class III ([address]  No date: Aortic aneurysm ([address]  2/16/2011: CAD (coronary Artery Disease) Native Artery  2/10/2017: CKD (chronic kidney disease) stage 4, GFR 15-2*  No date: Diabetic gastroparesis ([facility]  13/2/0563: Diastolic heart failure ([address]  2012: Esophageal stricture      Comment: dialted Dr. Michel Turner  No date: G6PD deficiency ([facility]      Comment: trait  No date: GERD (gastroesophageal reflux disease)  2/16/2011: Hypertensive Cardiovasc Dis Benign, No CHF  No date: ILD (interstitial lung disease) ([facility]      Comment: open lung bx CJW 2010  No date: OA (osteoarthritis)  2/16/2011: Obesity  No date: Ovarian cancer ([facility]      Comment: cervical and uterine  No date: Rheumatoid arteritis ([facility]  8/9/2013: T2DM (type 2 diabetes mellitus) ([facility]  3/21/2012: Tobacco use disorder  No date: Uterine cervix cancer ([facility]  8/9/2013: Vitamin D deficiency    PSHx significant for: Past Surgical History:  No date: CARDIAC SURG PROCEDURE UNLIST      Comment: stents  6/24/2016: COLONOSCOPY N/A      Comment: COLONOSCOPY performed by Magui Muñiz MD                at [address] [place] [address]  No date: HX APPENDECTOMY  No date: HX CARPAL TUNNEL RELEASE      Comment: bilateral  No date: HX CHOLECYSTECTOMY  No date: HX HERNIA REPAIR  No date: HX HYSTERECTOMY  11/12/12: HX ORTHOPAEDIC      Comment: right knee replacement  No date: HX TONSIL AND ADENOIDECTOMY  6/24/2016: UPPER GI ENDOSCOPYVINOD      Comment:          -- Contrast Dye (Iodine) -- Anaphylaxis   -- Levaquin (Levofloxacin) -- Nausea and Vomiting   -- Morphine -- Hives and Itching    There are no other complaints, changes or physical findings at this time. The history is provided by the patient.         Past Medical History:   Diagnosis Date     Sleep Apnea 2/16/2011    Angina, class III ([facility] 8/9/2013    Aortic aneurysm ([facility]     CAD (coronary Artery Disease) Native Artery 2/16/2011    CKD (chronic kidney disease) stage 4, GFR 15-29 ml/min ([facility] 2/10/2017    Diabetic gastroparesis ([address]     Diastolic heart failure ([facility] 10/5/2012    Esophageal stricture 2012    dialted Dr. Selin Christian G6PD deficiency ([facility]     trait    GERD (gastroesophageal reflux disease)     Hypertensive Cardiovasc Dis Benign, No CHF 2/16/2011    ILD (interstitial lung disease) ([facility]     open lung bx CJW 2010    OA (osteoarthritis)     Obesity 2/16/2011    Ovarian cancer ([facility]     cervical and uterine    Rheumatoid arteritis ([facility]     T2DM (type 2 diabetes mellitus) ([facility] 8/9/2013    Tobacco use disorder 3/21/2012    Uterine cervix cancer ([facility]     Vitamin D deficiency 8/9/2013       Past Surgical History:   Procedure Laterality Date    CARDIAC SURG PROCEDURE UNLIST      stents    COLONOSCOPY N/A 6/24/2016    COLONOSCOPY performed by Bao Salazar MD at [address] [facility] HX APPENDECTOMY      HX CARPAL TUNNEL RELEASE      bilateral    HX CHOLECYSTECTOMY      HX HERNIA REPAIR      HX HYSTERECTOMY      HX ORTHOPAEDIC  11/12/12    right knee replacement    HX TONSIL AND ADENOIDECTOMY      UPPER GI ENDOSCOPY,VINOD PINZON  6/24/2016              Family History:   Problem Relation Age of Onset    Heart Disease Mother     Heart Disease Brother        Social History     Social History    Marital status:      Spouse name: N/A    Number of children: N/A    Years of education: N/A     Occupational History    Not on file. Social History Main Topics    Smoking status: Former Smoker     Packs/day: 0.50     Years: 41.00     Types: Cigarettes     Quit date: 11/9/2014    Smokeless tobacco: Never Used    Alcohol use No    Drug use: No    Sexual activity: Not on file     Other Topics Concern    Not on file     Social History Narrative         ALLERGIES: Contrast dye [iodine];  Levaquin [levofloxacin]; and Morphine    Review of Systems   Constitutional: Negative. Negative for appetite change, chills, fatigue and fever. HENT: Negative. Negative for congestion and sore throat. Eyes: Negative. Negative for visual disturbance. Respiratory: Positive for shortness of breath. Negative for cough and wheezing. Cardiovascular: Positive for chest pain and leg swelling. Negative for palpitations. Gastrointestinal: Negative. Negative for abdominal pain, constipation, diarrhea, nausea and vomiting. Genitourinary: Negative. Negative for dysuria, flank pain and hematuria. Musculoskeletal: Negative. Negative for back pain and neck pain. Skin: Negative. Negative for rash. Neurological: Negative. Negative for dizziness, syncope, weakness, numbness and headaches. Hematological: Negative. Psychiatric/Behavioral: Negative. All other systems reviewed and are negative. Vitals:    02/28/17 1537 02/28/17 1545 02/28/17 1600 02/28/17 1630   BP: 147/75 127/69 141/76 165/76   Pulse: 87 76 77 78   Resp: 21 19 21 27   Temp:       SpO2: 98% 97% 98% 96%   Weight:       Height:                Physical Exam   Constitutional: She is oriented to person, place, and time. She appears well-developed and well-nourished. No distress. No acute distress, nasal cannula in place at 2L O2/minute   HENT:   Head: Normocephalic and atraumatic. Right Ear: External ear normal.   Left Ear: External ear normal.   Nose: Nose normal.   Mouth/Throat: Oropharynx is clear and moist.   Neck: Normal range of motion. Neck supple. Cardiovascular: Normal rate, regular rhythm, S1 normal, S2 normal, normal heart sounds, intact distal pulses and normal pulses. Exam reveals no gallop and no friction rub. No murmur heard. Pulses:       Radial pulses are 2+ on the right side, and 2+ on the left side. Dorsalis pedis pulses are 2+ on the right side, and 2+ on the left side.    Pulmonary/Chest: Effort normal. No accessory muscle usage. No tachypnea. No respiratory distress. She has no decreased breath sounds. She has no wheezes. She has rhonchi (coarse breath sounds throughout bilaterally, inc bases). She has no rales. She exhibits no tenderness. Able to speak in full sentences without difficulty. No acute respiratory distress at this time   Abdominal: Soft. Normal appearance and bowel sounds are normal. She exhibits no distension. There is no hepatosplenomegaly. There is no tenderness. There is no rebound, no guarding and no CVA tenderness. Musculoskeletal: Normal range of motion. She exhibits edema (mild bilateral LE). She exhibits no tenderness. Bilateral calf tenderness to palpation with associated mild nonpitting edema bilaterally. NVI distally, DP pulse 2+. FROM spine and all joints/extremities in ER without difficulty. Ambulatory in ER without difficulty. Neurological: She is alert and oriented to person, place, and time. She has normal strength. No sensory deficit. Skin: Skin is warm and dry. No rash noted. She is not diaphoretic. No erythema. No pallor. Psychiatric: She has a normal mood and affect. Her behavior is normal.   Nursing note and vitals reviewed.        MDM  Number of Diagnoses or Management Options  Chest pain, unspecified type:   Dyspnea on exertion:   HAP (hospital-acquired pneumonia):   Diagnosis management comments: DDx: PE, ACS, CHF exacerbation, pneumonia, electrolyte imbalance       Amount and/or Complexity of Data Reviewed  Clinical lab tests: ordered and reviewed  Tests in the radiology section of CPT®: reviewed and ordered  Obtain history from someone other than the patient: yes ( )  Review and summarize past medical records: yes  Discuss the patient with other providers: yes (Dr. Kiko Morrissey)  Independent visualization of images, tracings, or specimens: yes    Patient Progress  Patient progress: stable    ED Course       Procedures          1:28 PM   Yolanda Shirley PA-C discussed patient with Ramírez Vaughn MD who is in agreement with care plan as outlined. Further recommends VQ scan. Will be in to see the patient. No further recommendations. William Boo PA-C       EKG interpretation: (Preliminary)  Rhythm: normal sinus rhythm and PVC's; and regular . Rate (approx.): 87; Axis: normal; GA interval: normal; QRS interval: normal ; ST/T wave: normal; Other findings: borderline ekg. In comparison to past EKG from 2/17/17, PVCs now present, ventricular rate increased, and QT lengthened. William Boo PA-C       4:13 PM  William Boo PA-C reviewed CXR with Ramírez Vaughn MD and comparison to prior CXR on 2/16 and 1/25. Increased bilateral opacities are concerning for pneumonia given pts clinical sx. Due to prolonged QT new today in ER and pt being on coumadin, will not begin azithromycin or levaquin at this time. Discussed with Ramírez Vaughn MD who agrees. Recommends starting cefepime and vanc given presumed HAP. Will admit to family practice. William Boo PA-C       4:26 PM  William Boo PA-C  into reevaluate patient at this time. Updated on available results. All questions answered. Pt last on abx 2/10 when admitted to the hospital. No abx since that time. Presumed pneumonia, hospital acquired. Will admit William Boo PA-C      CONSULT NOTE:   4:54 PM  William Boo PA-C  spoke with [facility] Resident,   Specialty: [facility]  Discussed pt's hx, disposition, and available diagnostic and imaging results. Reviewed care plans. Consultant agrees with plans as outlined. Will admit patient. William Boo PA-C      4:54 PM  Patient is being admitted to the hospital.  The results of their tests and reasons for their admission have been discussed with them and/or available family. They convey agreement and understanding for the need to be admitted and for their admission diagnosis.   Consultation has been made with the inpatient physician specialist for hospitalization. LABORATORY TESTS:  Recent Results (from the past 12 hour(s))   EKG, 12 LEAD, INITIAL    Collection Time: 02/28/17 12:37 PM   Result Value Ref Range    Ventricular Rate 87 BPM    Atrial Rate 87 BPM    P-R Interval 174 ms    QRS Duration 74 ms    Q-T Interval 388 ms    QTC Calculation (Bezet) 466 ms    Calculated P Axis 32 degrees    Calculated R Axis -11 degrees    Calculated T Axis 59 degrees    Diagnosis       Sinus rhythm with occasional premature ventricular complexes  Possible Left atrial enlargement  Borderline ECG  When compared with ECG of 17-FEB-2017 08:25,  premature ventricular complexes are now present  Vent. rate has increased BY  29 BPM  QT has lengthened     CBC WITH AUTOMATED DIFF    Collection Time: 02/28/17 12:55 PM   Result Value Ref Range    WBC 6.6 3.6 - 11.0 K/uL    RBC 3.16 (L) 3.80 - 5.20 M/uL    HGB 9.2 (L) 11.5 - 16.0 g/dL    HCT 30.2 (L) 35.0 - 47.0 %    MCV 95.6 80.0 - 99.0 FL    MCH 29.1 26.0 - 34.0 PG    MCHC 30.5 30.0 - 36.5 g/dL    RDW 14.9 (H) 11.5 - 14.5 %    PLATELET 058 975 - 170 K/uL    NEUTROPHILS 61 32 - 75 %    LYMPHOCYTES 27 12 - 49 %    MONOCYTES 8 5 - 13 %    EOSINOPHILS 4 0 - 7 %    BASOPHILS 0 0 - 1 %    ABS. NEUTROPHILS 4.0 1.8 - 8.0 K/UL    ABS. LYMPHOCYTES 1.8 0.8 - 3.5 K/UL    ABS. MONOCYTES 0.5 0.0 - 1.0 K/UL    ABS. EOSINOPHILS 0.3 0.0 - 0.4 K/UL    ABS.  BASOPHILS 0.0 0.0 - 0.1 K/UL   METABOLIC PANEL, COMPREHENSIVE    Collection Time: 02/28/17 12:55 PM   Result Value Ref Range    Sodium 141 136 - 145 mmol/L    Potassium 4.5 3.5 - 5.1 mmol/L    Chloride 106 97 - 108 mmol/L    CO2 27 21 - 32 mmol/L    Anion gap 8 5 - 15 mmol/L    Glucose 204 (H) 65 - 100 mg/dL    BUN 38 (H) 6 - 20 MG/DL    Creatinine 3.89 (H) 0.55 - 1.02 MG/DL    BUN/Creatinine ratio 10 (L) 12 - 20      GFR est AA 14 (L) >60 ml/min/1.73m2    GFR est non-AA 12 (L) >60 ml/min/1.73m2    Calcium 8.9 8.5 - 10.1 MG/DL    Bilirubin, total 0.4 0.2 - 1.0 MG/DL    ALT (SGPT) 20 12 - 78 U/L AST (SGOT) 13 (L) 15 - 37 U/L    Alk. phosphatase 75 45 - 117 U/L    Protein, total 6.9 6.4 - 8.2 g/dL    Albumin 3.1 (L) 3.5 - 5.0 g/dL    Globulin 3.8 2.0 - 4.0 g/dL    A-G Ratio 0.8 (L) 1.1 - 2.2     PRO-BNP    Collection Time: 02/28/17 12:55 PM   Result Value Ref Range    NT pro- (H) 0 - 125 PG/ML   PROTHROMBIN TIME + INR    Collection Time: 02/28/17 12:55 PM   Result Value Ref Range    INR 2.3 (H) 0.9 - 1.1      Prothrombin time 23.8 (H) 9.0 - 11.1 sec   PTT    Collection Time: 02/28/17 12:55 PM   Result Value Ref Range    aPTT 34.8 (H) 22.1 - 32.5 sec    aPTT, therapeutic range     58.0 - 77.0 SECS   TROPONIN I    Collection Time: 02/28/17 12:55 PM   Result Value Ref Range    Troponin-I, Qt. <0.04 <0.05 ng/mL   CK W/ CKMB & INDEX    Collection Time: 02/28/17 12:55 PM   Result Value Ref Range    CK 81 26 - 192 U/L    CK - MB <1.0 <3.6 NG/ML    CK-MB Index Cannot be calulated 0 - 2.5         IMAGING RESULTS:  XR CHEST PA LAT   Final Result      DUPLEX LOWER EXT VENOUS BILAT         NM LUNG VENT/PERF    (Results Pending)     Xr Chest Pa Lat    Result Date: 2/28/2017  Exam:  2 view chest Indication: Chest pain and shortness of breath, worsening shortness of breath and chest pain x2 days. COMPARISON: February 17, 2017 and CT of chest of February 2, 2017 and chest x-ray of 1/25/2017 PA and lateral views demonstrate normal heart size. The patient is on a cardiac monitor. The lungs are abnormal. There are irregular parenchymal opacities that have the appearance of airspace consolidation and associated scarring and fibrosis are again noted and unchanged. These are most pronounced in the left mid lower lung zone in the right mid zone. The calcified left hilar lymph nodes are unchanged. No pleural effusions. IMPRESSION: 1. Lungs are abnormal but the appearance has not changed when compared 1/25/2017. These findings suggest chronic inflammation or infection with areas of scarring.       MEDICATIONS GIVEN:  Medications   sodium chloride (NS) flush 5-10 mL (not administered)   sodium chloride (NS) flush 5-10 mL (not administered)   cefepime (MAXIPIME) 2 g in 0.9% sodium chloride (MBP/ADV) 100 mL (2 g IntraVENous New Bag 2/28/17 1636)   vancomycin (VANCOCIN) 2,500 mg in 0.9% sodium chloride 500 mL IVPB (not administered)   oxyCODONE-acetaminophen (PERCOCET) 5-325 mg per tablet 1 Tab (1 Tab Oral Given 2/28/17 1634)   ondansetron (ZOFRAN ODT) tablet 4 mg (4 mg Oral Given 2/28/17 1634)       IMPRESSION:  1. HAP (hospital-acquired pneumonia)    2. Dyspnea on exertion    3. Chest pain, unspecified type        PLAN:  1.  Admit to family practice

## 2017-02-28 NOTE — IP AVS SNAPSHOT
Jayden Zeker[address]y [place] [address] [phone] Patient: Shine Honeycutt MRN: [id number] GPP:[birth year] You are allergic to the following Allergen Reactions Contrast Dye (Iodine) Anaphylaxis Levaquin (Levofloxacin) Nausea and Vomiting Morphine Hives Itching Recent Documentation Height Weight BMI OB Status Smoking Status 1.778 m 157 kg 49.66 kg/m2 Hysterectomy Former Smoker Emergency Contacts Name Discharge Info Relation Home Work Mobile [address] [place] [address] CAREGIVER [3] Spouse [3] [phone] Gertrude Friedman  Spouse [3] [phone] About your hospitalization You were admitted on:  February 28, 2017 You last received care in the:  [facility]  MED SURG 2 You were discharged on:  March 10, 2017 Unit phone number:  [phone] Why you were hospitalized Your primary diagnosis was:  Hcap (Healthcare-Associated Pneumonia) Your diagnoses also included:  Coronary Atherosclerosis Of Native Coronary Artery, Chronic Diastolic Heart Failure (Hcc), Ckd (Chronic Kidney Disease) Stage 4, Gfr 15-29 Ml/Min (Formerly [facility]), Dm (Diabetes Mellitus), Type 2 With Renal Complications (Formerly [facility]), Dvt (Deep Venous Thrombosis) (Formerly [facility]), Djd (Degenerative Joint Disease) Of Knee, Htn (Hypertension), Morbid Obesity (Formerly [facility]), Acute Exacerbation Of Chronic Obstructive Pulmonary Disease (Copd) (Formerly [facility]), Acute And Chronic Respiratory Failure With Hypoxia (Formerly [facility]), Interstitial Lung Disease (Formerly [facility]), Pulmonary Htn (Formerly [facility]) Providers Seen During Your Hospitalizations Provider Role Specialty Primary office phone Wilmer More MD Attending Provider Emergency Medicine [phone] Radha Daniel MD Attending Provider Emergency Medicine [phone] Karissa Benton MD Attending Provider Family Practice [phone] Julius Cranker, DO Attending Provider Internal Medicine [phone] Emeka Chandler MD Attending Provider Internal Medicine [phone] Your Primary Care Physician (PCP) Primary Care Physician Office Phone Office Fax Gabbi Terry [phone] Follow-up Information Follow up With Details Comments Contact Info Kianna Amy DO Julianna Go in 1 week  [address] [address] 
[phone] Griffin Cramer MD Schedule an appointment as soon as possible for a visit in 2 days for your kidney [address] [facility] [address] [place] [address] 
[phone] Please return patient's own Stiolto inhaler stored in patient specific bin in [employer] Enrique Khanna MD In 2 weeks  [address] [phone] 1007 [facility] 
[phone] Enrique Khanna MD On 4/17/2017 3:40 pm  [address] [phone] 1007 [facility] 
[phone] Norleen Mortimer, DO In 2 weeks  [address] [facility] [address] [place] [address] 
[phone] Your Appointments Monday April 17, 2017  3:40 PM EDT HOSPITAL DISCHARGE with Enrique Khanna MD  
[facility] [address] [facility] [address] 1007 [facility]  
[phone] Current Discharge Medication List  
  
START taking these medications Dose & Instructions Dispensing Information Comments Morning Noon Evening Bedtime  
 magnesium oxide 400 mg tablet Commonly known as:  MAG-OX Your next dose is: Today, Tomorrow Other:  _________ Dose:  400 mg Take 1 Tab by mouth two (2) times a day. Quantity:  60 Tab Refills:  0  
     
   
   
   
  
 metOLazone 5 mg tablet Commonly known as:  Godwin Sprang Your next dose is: Today, Tomorrow Other:  _________ Dose:  5 mg Take 1 Tab by mouth every Monday, Wednesday, Friday. Quantity:  15 Tab Refills:  0  
     
   
   
   
  
 predniSONE 10 mg tablet Commonly known as:  Ellie Chill Your next dose is: Today, Tomorrow Other:  _________ Dose:  10 mg Take 1 Tab by mouth daily for 3 days. Quantity:  3 Tab Refills:  0 CONTINUE these medications which have NOT CHANGED Dose & Instructions Dispensing Information Comments Morning Noon Evening Bedtime  
 albuterol 90 mcg/actuation inhaler Commonly known as:  PROVENTIL HFA, VENTOLIN HFA, PROAIR HFA Your next dose is: Today, Tomorrow Other:  _________ Dose:  2 Puff Take 2 Puffs by inhalation every four (4) hours as needed for Wheezing. Refills:  0  
     
   
   
   
  
 aspirin 81 mg tablet Your next dose is: Today, Tomorrow Other:  _________ Dose:  81 mg Take 81 mg by mouth daily. Refills:  0  
     
   
   
   
  
 atorvastatin 80 mg tablet Commonly known as:  LIPITOR Your next dose is: Today, Tomorrow Other:  _________ Dose:  80 mg Take 80 mg by mouth every evening. Refills:  0 BACTROBAN 2 % ointment Generic drug:  mupirocin Your next dose is: Today, Tomorrow Other:  _________ Apply  to affected area two (2) times daily as needed (lesions on feet when needed). Ointment external two times daily to lesions on feet until healed - now using as needed Refills:  0  
     
   
   
   
  
 bumetanide 2 mg tablet Commonly known as:  Ledon Lights Your next dose is: Today, Tomorrow Other:  _________ Dose:  2 mg Take 1 Tab by mouth two (2) times a day. Quantity:  60 Tab Refills:  0  
     
   
   
   
  
 calcitRIOL 0.25 mcg capsule Commonly known as:  ROCALTROL Your next dose is: Today, Tomorrow Other:  _________ Dose:  0.25 mcg Take 0.25 mcg by mouth two (2) days a week. Patient takes on Monday and Thursday Refills:  0  
     
   
   
   
  
 CARAFATE 1 gram tablet Generic drug:  sucralfate Your next dose is: Today, Tomorrow Other:  _________ Dose:  1 g Take 1 g by mouth Before breakfast, lunch, dinner and at bedtime. Refills:  0  
     
   
   
   
  
 carvedilol 25 mg tablet Commonly known as:  Ozie Bitter Your next dose is: Today, Tomorrow Other:  _________ TAKE 1 TABLET BY MOUTH TWICE A DAY Quantity:  180 Tab Refills:  3  
     
   
   
   
  
 docusate sodium 100 mg capsule Commonly known as:  Henreitta [place] Your next dose is: Today, Tomorrow Other:  _________ Dose:  100 mg Take 100 mg by mouth nightly. Refills:  0 DUONEB 2.5 mg-0.5 mg/3 ml Nebu Generic drug:  albuterol-ipratropium Your next dose is: Today, Tomorrow Other:  _________ Dose:  3 mL  
3 mL by Nebulization route daily as needed. Takes about 4 days a week Refills:  0  
     
   
   
   
  
 FLONASE 50 mcg/actuation nasal spray Generic drug:  fluticasone Your next dose is: Today, Tomorrow Other:  _________ Dose:  2 Spray 2 Sprays by Both Nostrils route nightly as needed. Refills:  0  
     
   
   
   
  
 hydrALAZINE 25 mg tablet Commonly known as:  APRESOLINE Your next dose is: Today, Tomorrow Other:  _________ Dose:  25 mg Take 1 Tab by mouth three (3) times daily. Quantity:  90 Tab Refills:  0  
     
   
   
   
  
 isosorbide mononitrate  mg CR tablet Commonly known as:  IMDUR Your next dose is: Today, Tomorrow Other:  _________ TAKE 1 TABLET BY MOUTH EVERY DAY Quantity:  30 Tab Refills:  5  
     
   
   
   
  
 linaclotide 290 mcg Cap capsule Commonly known as:  Ady Kelechi Your next dose is: Today, Tomorrow Other:  _________ Dose:  290 mcg Take 1 Cap by mouth Daily (before breakfast) for 10 days. Quantity:  30 Cap Refills:  0 MIRALAX 17 gram packet Generic drug:  polyethylene glycol Your next dose is: Today, Tomorrow Other:  _________ Dose:  17 g Take 17 g by mouth daily as needed. Refills:  0  
     
   
   
   
  
 nitroglycerin 0.3 mg SL tablet Commonly known as:  NITROSTAT Your next dose is: Today, Tomorrow Other:  _________ Dose:  0.4 mg  
1 Tab by SubLINGual route every five (5) minutes as needed for Chest Pain. Quantity:  1 Bottle Refills:  1 NovoLOG Mix 70-30 100 unit/mL (70-30) injection Generic drug:  insulin aspart protamine/insulin aspart Your next dose is: Today, Tomorrow Other:  _________  
   
   
 by SubCUTAneous route. Uses sliding scale Refills:  0  
     
   
   
   
  
 ondansetron 4 mg disintegrating tablet Commonly known as:  ZOFRAN ODT Your next dose is: Today, Tomorrow Other:  _________ Dose:  4 mg Take 1 Tab by mouth every six (6) hours as needed for Nausea. Quantity:  30 Tab Refills:  0  
     
   
   
   
  
 oxyCODONE-acetaminophen 5-325 mg per tablet Commonly known as:  PERCOCET Your next dose is: Today, Tomorrow Other:  _________ Dose:  1 Tab Take 1 Tab by mouth every eight (8) hours as needed for Pain. Max Daily Amount: 3 Tabs. Quantity:  90 Tab Refills:  0  
     
   
   
   
  
 pantoprazole 40 mg tablet Commonly known as:  PROTONIX Your next dose is: Today, Tomorrow Other:  _________ Dose:  40 mg Take 1 Tab by mouth daily. Indications: GASTROESOPHAGEAL REFLUX Quantity:  90 Tab Refills:  1 Senna 8.6 mg tablet Generic drug:  senna Your next dose is: Today, Tomorrow Other:  _________ Dose:  2 Tab Take 2 Tabs by mouth nightly. Refills:  0 STIOLTO RESPIMAT 2.5-2.5 mcg/actuation Mist  
Generic drug:  tiotropium-olodaterol Your next dose is: Today, Tomorrow Other:  _________ Dose:  2 Puff Take 2 Puffs by inhalation Daily (before lunch). Uses daily at 11 am  
 Quantity:  1 Inhaler Refills:  1 VITAMIN D2 50,000 unit capsule Generic drug:  ergocalciferol Your next dose is: Today, Tomorrow Other:  _________ Dose:  23568 Units Take 50,000 Units by mouth every Tuesday and Thursday. Refills:  0  
     
   
   
   
  
 * warfarin 2 mg tablet Commonly known as:  COUMADIN Your next dose is: Today, Tomorrow Other:  _________ Dose:  4 mg Take 4 mg by mouth two (2) days a week. Monday and Friday Refills:  0  
     
   
   
   
  
 * warfarin 2 mg tablet Commonly known as:  COUMADIN Your next dose is: Today, Tomorrow Other:  _________ Dose:  2 mg Take 2 mg by mouth five (5) days a week. Tues, Wed, Thurs, ,   
 Refills:  0  
     
   
   
   
  
 * Notice: This list has 2 medication(s) that are the same as other medications prescribed for you. Read the directions carefully, and ask your doctor or other care provider to review them with you. Where to Get Your Medications These medications were sent to [address] [place] [address], [address], [address] [place] [address] Hours:  24-hours Phone:  [phone]  
  magnesium oxide 400 mg tablet Information on where to get these meds will be given to you by the nurse or doctor. ! Ask your nurse or doctor about these medications  
  linaclotide 290 mcg Cap capsule  
 metOLazone 5 mg tablet  
 predniSONE 10 mg tablet Discharge Instructions HOSPITALIST DISCHARGE INSTRUCTIONS 
NAME: Romain Joseph :  1955 MRN:  [id number] Date/Time:  3/10/2017 1:57 PM 
 
 ADMIT DATE: 2/28/2017 DISCHARGE DATE: 3/10/2017 PRINCIPAL DISCHARGE DIAGNOSES: 
COPD Worsening chronic kidney disease MEDICATIONS: 
· It is important that you take the medication exactly as they are prescribed. Note the changes and additions to your medications. Be sure you understand these changes before you are discharged today. · Keep your medication in the bottles provided by the pharmacist and keep a list of the medication names, dosages, and times to be taken in your wallet. · Do not take other medications without consulting your doctor. Pain Management: per above medications What to do at [facility] Recommended diet:  Cardiac Diet, Diabetic Diet, Low fat, Low cholesterol and Renal Diet Recommended activity: Activity as tolerated If you experience any of the following symptoms then please call your primary care physician or return to the emergency room if you cannot get hold of your doctor: 
 
Fever, chills, severe abdominal pain, nausea, vomiting, diarrhea, change in mentation, falling, bleeding, severe chest pain, shortness of breath, or other severe concerning symptoms that brought you to the hospital in the first place Follow Up: Follow-up Information Follow up With Details Comments Contact Info Rosmery Levine DO Go in 1 week  [address]  [address] [address] [facility] [address] 
[phone] Yancy Medina MD Schedule an appointment as soon as possible for a visit in 2 days for your kidney 50 [facility] [address] 
[phone] Please return patient's own Stiolto inhaler stored in patient specific bin in [employer] Daphne Loya MD In 2 weeks  [address] [facility] [place] [phone] 1007 [facility] 
[phone] Daphne Loya MD On 4/17/2017 3:40 pm  [address] [facility] [address] [phone] 1007 [facility] 
[phone] Michael Nix DO In 2 weeks  3[address] [facility] [address] 
[phone] Information obtained by : 
I understand that if any problems occur once I am at home I am to contact my physician. I understand and acknowledge receipt of the instructions indicated above. Physician's or R.N.'s Signature                                                                  Date/Time Patient or Representative Signature                                                          Date/Time Pneumonia: Care Instructions Your Care Instructions Pneumonia is an infection of the lungs. Most cases are caused by infections from bacteria or viruses. Pneumonia may be mild or very severe. If it is caused by bacteria, you will be treated with antibiotics. It may take a few weeks to a few months to recover fully from pneumonia, depending on how sick you were and whether your overall health is good. Follow-up care is a key part of your treatment and safety. Be sure to make and go to all appointments, and call your doctor if you are having problems. Its also a good idea to know your test results and keep a list of the medicines you take. How can you care for yourself at home? · Take your antibiotics exactly as directed. Do not stop taking the medicine just because you are feeling better. You need to take the full course of antibiotics. · Take your medicines exactly as prescribed. Call your doctor if you think you are having a problem with your medicine. · Get plenty of rest and sleep. You may feel weak and tired for a while, but your energy level will improve with time. · To prevent dehydration, drink plenty of fluids, enough so that your urine is light yellow or clear like water.  Choose water and other caffeine-free clear liquids until you feel better. If you have kidney, heart, or liver disease and have to limit fluids, talk with your doctor before you increase the amount of fluids you drink. · Take care of your cough so you can rest. A cough that brings up mucus from your lungs is common with pneumonia. It is one way your body gets rid of the infection. But if coughing keeps you from resting or causes severe fatigue and chest-wall pain, talk to your doctor. He or she may suggest that you take a medicine to reduce the cough. · Use a vaporizer or humidifier to add moisture to your bedroom. Follow the directions for cleaning the machine. · Do not smoke or allow others to smoke around you. Smoke will make your cough last longer. If you need help quitting, talk to your doctor about stop-smoking programs and medicines. These can increase your chances of quitting for good. · Take an over-the-counter pain medicine, such as acetaminophen (Tylenol), ibuprofen (Advil, Motrin), or naproxen (Aleve). Read and follow all instructions on the label. · Do not take two or more pain medicines at the same time unless the doctor told you to. Many pain medicines have acetaminophen, which is Tylenol. Too much acetaminophen (Tylenol) can be harmful. · If you were given a spirometer to measure how well your lungs are working, use it as instructed. This can help your doctor tell how your recovery is going. · To prevent pneumonia in the future, talk to your doctor about getting a flu vaccine (once a year) and a pneumococcal vaccine (one time only for most people). When should you call for help? Call 911 anytime you think you may need emergency care. For example, call if: 
· You have severe trouble breathing. Call your doctor now or seek immediate medical care if: 
· You cough up dark brown or bloody mucus (sputum). · You have new or worse trouble breathing. · You are dizzy or lightheaded, or you feel like you may faint. Watch closely for changes in your health, and be sure to contact your doctor if: 
· You have a new or higher fever. · You are coughing more deeply or more often. · You are not getting better after 2 days (48 hours). · You do not get better as expected. Where can you learn more? Go to http://lindsay-kai.info/. Enter [id number] in the search box to learn more about \"Pneumonia: Care Instructions. \" Current as of: May 23, 2016 Content Version: 11.1 © 9452-8894 [employer]mi. Care instructions adapted under license by [employer] (which disclaims liability or warranty for this information). If you have questions about a medical condition or this instruction, always ask your healthcare professional. [address] any warranty or liability for your use of this information. HOME DISCHARGE INSTRUCTIONS Follow-up tests needed: - Follow up PFTs outpatient, bmp, INR Nutrition Recommendations for Discharge: 
 
Continue Oral Nutrition Supplements at discharge:  
Glucerna Advance or Glucerna Shake 1-2 times per day 
for 30 days unless otherwise directed by your Primary Care Physician. Tiny Schooling, RD Avoiding Triggers With Heart Failure: Care Instructions Your Care Instructions Triggers are anything that make your heart failure flare up. A flare-up is also called \"sudden heart failure\" or \"acute heart failure. \" When you have a flare-up, fluid builds up in your lungs, and you have problems breathing. You might need to go to the hospital. By watching for changes in your condition and avoiding triggers, you can prevent heart failure flare-ups. Follow-up care is a key part of your treatment and safety.  Be sure to make and go to all appointments, and call your doctor if you are having problems. It's also a good idea to know your test results and keep a list of the medicines you take. How can you care for yourself at home? Watch for changes in your weight and condition · Weigh yourself without clothing at the same time each day. Record your weight. Call your doctor if you gain 3 pounds or more in 2 to 3 days. A sudden weight gain may mean that your heart failure is getting worse. · Keep a daily record of your symptoms. Write down any changes in how you feel, such as new shortness of breath, cough, or problems eating. Also record if your ankles are more swollen than usual and if you have to urinate in the night more often. Note anything that you ate or did that could have triggered these changes. Limit sodium Sodium causes your body to hold on to water, making it harder for your heart to pump. People get most of their sodium from processed foods. Fast food and restaurant meals also tend to be very high in sodium. · Your doctor may suggest that you limit sodium to 2,000 milligrams (mg) a day or less. That is less than 1 teaspoon of salt a day, including all the salt you eat in cooking or in packaged foods. · Read food labels on cans and food packages. They tell you how much sodium you get in one serving. Check the serving size. If you eat more than one serving, you are getting more sodium. · Be aware that sodium can come in forms other than salt, including monosodium glutamate (MSG), sodium citrate, and sodium bicarbonate (baking soda). MSG is often added to Asian food. You can sometimes ask for food without MSG or salt. · Slowly reducing salt will help you adjust to the taste. Take the salt shaker off the table. · Flavor your food with garlic, lemon juice, onion, vinegar, herbs, and spices instead of salt. Do not use soy sauce, steak sauce, onion salt, garlic salt, mustard, or ketchup on your food, unless it is labeled \"low-sodium\" or \"low-salt. \" 
 · Make your own salad dressings, sauces, and ketchup without adding salt. · Use fresh or frozen ingredients, instead of canned ones, whenever you can. Choose low-sodium canned goods. · Eat less processed food and food from restaurants, including fast food. Exercise as directed Moderate, regular exercise is very good for your heart. It improves your blood flow and helps control your weight. But too much exercise can stress your heart and cause a heart failure flare-up. · Check with your doctor before you start an exercise program. 
· Walking is an easy way to get exercise. Start out slowly. Gradually increase the length and pace of your walk. Swimming, riding a bike, and using a treadmill are also good forms of exercise. · When you exercise, watch for signs that your heart is working too hard. You are pushing yourself too hard if you cannot talk while you are exercising. If you become short of breath or dizzy or have chest pain, stop, sit down, and rest. 
· Do not exercise when you do not feel well. Take medicines correctly · Take your medicines exactly as prescribed. Call your doctor if you think you are having a problem with your medicine. · Make a list of all the medicines you take. Include those prescribed to you by other doctors and any over-the-counter medicines, vitamins, or supplements you take. Take this list with you when you go to any doctor. · Take your medicines at the same time every day. It may help you to post a list of all the medicines you take every day and what time of day you take them. · Make taking your medicine as simple as you can. Plan times to take your medicines when you are doing other things, such as eating a meal or getting ready for bed. This will make it easier to remember to take your medicines. · Get organized. Use helpful tools, such as daily or weekly pill containers. When should you call for help? Call 911 if you have symptoms of sudden heart failure such as: · You have severe trouble breathing. · You cough up pink, foamy mucus. · You have a new irregular or rapid heartbeat. Call your doctor now or seek immediate medical care if: 
· You have new or increased shortness of breath. · You are dizzy or lightheaded, or you feel like you may faint. · You have sudden weight gain, such as 3 pounds or more in 2 to 3 days. · You have increased swelling in your legs, ankles, or feet. · You are suddenly so tired or weak that you cannot do your usual activities. Watch closely for changes in your health, and be sure to contact your doctor if you develop new symptoms. Where can you learn more? Go to http://lindsay-kai.info/. Enter O611 in the search box to learn more about \"Avoiding Triggers With Heart Failure: Care Instructions. \" Current as of: April 27, 2016 Content Version: 11.1 © 5331-0276 [employer]ie. Care instructions adapted under license by [employer] (which disclaims liability or warranty for this information). If you have questions about a medical condition or this instruction, always ask your healthcare professional. [place] [address] any warranty or liability for your use of this information. Chronic Obstructive Pulmonary Disease (COPD): Care Instructions Your Care Instructions Chronic obstructive pulmonary disease (COPD) is a general term for a group of lung diseases, including emphysema and chronic bronchitis. People with COPD have decreased airflow in and out of the lungs, which makes it hard to breathe. The airways also can get clogged with thick mucus. Cigarette smoking is a major cause of COPD. Although there is no cure for COPD, you can slow its progress. Following your treatment plan and taking care of yourself can help you feel better and live longer. Follow-up care is a key part of your treatment and safety.  Be sure to make and go to all appointments, and call your doctor if you are having problems. It's also a good idea to know your test results and keep a list of the medicines you take. How can you care for yourself at home? Staying healthy · Do not smoke. This is the most important step you can take to prevent more damage to your lungs. If you need help quitting, talk to your doctor about stop-smoking programs and medicines. These can increase your chances of quitting for good. · Avoid colds and flu. Get a pneumococcal vaccine shot. If you have had one before, ask your doctor whether you need a second dose. Get the flu vaccine every fall. If you must be around people with colds or the flu, wash your hands often. · Avoid secondhand smoke, air pollution, and high altitudes. Also avoid cold, dry air and hot, humid air. Stay at home with your windows closed when air pollution is bad. Medicines and oxygen therapy · Take your medicines exactly as prescribed. Call your doctor if you think you are having a problem with your medicine. · You may be taking medicines such as: ¨ Bronchodilators. These help open your airways and make breathing easier. Bronchodilators are either short-acting (work for 6 to 9 hours) or long-acting (work for 24 hours). You inhale most bronchodilators, so they start to act quickly. Always carry your quick-relief inhaler with you in case you need it while you are away from home. ¨ Corticosteroids (prednisone, budesonide). These reduce airway inflammation. They come in pill or inhaled form. You must take these medicines every day for them to work well. · A spacer may help you get more inhaled medicine to your lungs. Ask your doctor or pharmacist if a spacer is right for you. If it is, ask how to use it properly. · Do not take any vitamins, over-the-counter medicine, or herbal products without talking to your doctor first. 
· If your doctor prescribed antibiotics, take them as directed.  Do not stop taking them just because you feel better. You need to take the full course of antibiotics. · Oxygen therapy boosts the amount of oxygen in your blood and helps you breathe easier. Use the flow rate your doctor has recommended, and do not change it without talking to your doctor first. 
Activity · Get regular exercise. Walking is an easy way to get exercise. Start out slowly, and walk a little more each day. · Pay attention to your breathing. You are exercising too hard if you cannot talk while you are exercising. · Take short rest breaks when doing household chores and other activities. · Learn breathing methodssuch as breathing through pursed lipsto help you become less short of breath. · If your doctor has not set you up with a pulmonary rehabilitation program, talk to him or her about whether rehab is right for you. Rehab includes exercise programs, education about your disease and how to manage it, help with diet and other changes, and emotional support. Diet · Eat regular, healthy meals. Use bronchodilators about 1 hour before you eat to make it easier to eat. Eat several small meals instead of three large ones. Drink beverages at the end of the meal. Avoid foods that are hard to chew. · Eat foods that contain protein so that you do not lose muscle mass. Mental health · Talk to your family, friends, or a therapist about your feelings. It is normal to feel frightened, angry, hopeless, helpless, and even guilty. Talking openly about bad feelings can help you cope. If these feelings last, talk to your doctor. When should you call for help? Call 911 anytime you think you may need emergency care. For example, call if: 
· You have severe trouble breathing. Call your doctor now or seek immediate medical care if: 
· You have new or worse trouble breathing. · You cough up blood. · You have a fever. Watch closely for changes in your health, and be sure to contact your doctor if: · You cough more deeply or more often, especially if you notice more mucus or a change in the color of your mucus. · You have new or worse swelling in your legs or belly. · You are not getting better as expected. Where can you learn more? Go to http://lindsay-kai.info/. Purnima Proctorerrol in the search box to learn more about \"Chronic Obstructive Pulmonary Disease (COPD): Care Instructions. \" Current as of: May 23, 2016 Content Version: 11.1 © 0075-1715 [employer]. Care instructions adapted under license by [employer] (which disclaims liability or warranty for this information). If you have questions about a medical condition or this instruction, always ask your healthcare professional. [address] any warranty or liability for your use of this information. Discharge Orders None VIOSO Announcement We are excited to announce that we are making your provider's discharge notes available to you in VIOSO. You will see these notes when they are completed and signed by the physician that discharged you from your recent hospital stay. If you have any questions or concerns about any information you see in VIOSO, please call the Health Information Department where you were seen or reach out to your Primary Care Provider for more information about your plan of care. Introducing [facility]! Dear Komal Centeno: Thank you for requesting a VIOSO account. Our records indicate that you already have an active VIOSO account. You can access your account anytime at [email or web address] Did you know that you can access your hospital and ER discharge instructions at any time in VIOSO? You can also review all of your test results from your hospital stay or ER visit. Additional Information If you have questions, please visit the Frequently Asked Questions section of the [email or web address] website at [email or web address] Remember, MyChart is NOT to be used for urgent needs. For medical emergencies, dial 911. Now available from your iPhone and Android! General Information Please provide this summary of care documentation to your next provider. Patient Signature:  ____________________________________________________________ Date:  ____________________________________________________________  
  
Jacqlyn Newcomer Provider Signature:  ____________________________________________________________ Date:  ____________________________________________________________

## 2017-02-28 NOTE — PROGRESS NOTES
[facility] Dosing Services: 2/28/17    Pharmacist Renal Dosing Progress Note for Dr Pro Nail      The following medication: cefepime was automatically dose-adjusted per [facility] P&T Committee Protocol, with respect to renal function. Pt Weight:   Wt Readings from Last 1 Encounters:   02/28/17 148.3 kg (327 lb)         Previous Regimen  1gm ivbp q8h   Serum Creatinine Lab Results   Component Value Date/Time    Creatinine 3.89 02/28/2017 12:55 PM    Creatinine (POC) 2.1 07/21/2013 09:51 PM       Creatinine Clearance Estimated Creatinine Clearance: 24.7 mL/min (based on Cr of 3.89). BUN Lab Results   Component Value Date/Time    BUN 38 02/28/2017 12:55 PM    BUN (POC) 30 07/21/2013 09:51 PM       Dosage changed to:  2gm ivpb q24h      Pharmacy to continue to monitor patient daily. Will make dosage adjustments based upon changing renal function.   Edwin García PHARMD. Contact information:  [phone]

## 2017-02-28 NOTE — PROCEDURES
[facility]  *** FINAL REPORT ***    Name: Rj Reyez  MRN: [id number]    Outpatient  : 14 Dec 1955  HIS Order #: [id number]  [id number] [facility] Visit #: [id number]  Date: 2017    TYPE OF TEST: Peripheral Venous Testing    REASON FOR TEST  Pain in limb, Limb swelling    Right Leg:-  Deep venous thrombosis:           No  Superficial venous thrombosis:    No  Deep venous insufficiency:        No  Superficial venous insufficiency: No    Left Leg:-  Deep venous thrombosis:           No  Superficial venous thrombosis:    No  Deep venous insufficiency:        No  Superficial venous insufficiency: No      INTERPRETATION/FINDINGS  PROCEDURE:  BILATERAL LE VENOUS DUPLEX. Evaluation of lower extremity veins with ultrasound (B-mode imaging,  pulsed Doppler, color Doppler). Includes the common femoral, deep  femoral, femoral, popliteal, posterior tibial, peroneal, and great  saphenous veins. FINDINGS:  Unable to show compression of the peroneal veins  bilaterally due to patients body habitus. Gray scale and color flow  duplex images of the veins visualized in both lower extremities  demonstrate normal compressibility, spontaneous and augmented flow  profiles, and absence of filling defects throughout the deep and  superficial veins in both lower extremities. CONCLUSION:  Bilateral lower extremity venous duplex negative for deep   venous thrombosis or thrombophlebitis in the veins visualized. ADDITIONAL COMMENTS    NOTE: Significant improvement seen today compared to the study done on   17. I have personally reviewed the data relevant to the interpretation of  this  study. TECHNOLOGIST: Gisselle Morales RDCS  Signed: 2017 03:06 PM    PHYSICIAN: Josue Engel MD  Signed: 2017 09:26 AM

## 2017-03-01 ENCOUNTER — PATIENT OUTREACH (OUTPATIENT)
Dept: CARDIOLOGY CLINIC | Age: 62
End: 2017-03-01

## 2017-03-01 ENCOUNTER — PATIENT OUTREACH (OUTPATIENT)
Dept: FAMILY MEDICINE CLINIC | Age: 62
End: 2017-03-01

## 2017-03-01 PROBLEM — J44.1 ACUTE EXACERBATION OF CHRONIC OBSTRUCTIVE PULMONARY DISEASE (COPD) (HCC): Status: ACTIVE | Noted: 2017-03-01

## 2017-03-01 LAB
ALBUMIN SERPL BCP-MCNC: 3 G/DL (ref 3.5–5)
ALBUMIN/GLOB SERPL: 0.8 {RATIO} (ref 1.1–2.2)
ALP SERPL-CCNC: 75 U/L (ref 45–117)
ALT SERPL-CCNC: 19 U/L (ref 12–78)
ANION GAP BLD CALC-SCNC: 7 MMOL/L (ref 5–15)
AST SERPL W P-5'-P-CCNC: 22 U/L (ref 15–37)
BASOPHILS # BLD AUTO: 0 K/UL (ref 0–0.1)
BASOPHILS # BLD: 0 % (ref 0–1)
BILIRUB SERPL-MCNC: 0.6 MG/DL (ref 0.2–1)
BUN SERPL-MCNC: 40 MG/DL (ref 6–20)
BUN/CREAT SERPL: 10 (ref 12–20)
CALCIUM SERPL-MCNC: 8.3 MG/DL (ref 8.5–10.1)
CHLORIDE SERPL-SCNC: 105 MMOL/L (ref 97–108)
CO2 SERPL-SCNC: 28 MMOL/L (ref 21–32)
CREAT SERPL-MCNC: 3.92 MG/DL (ref 0.55–1.02)
EOSINOPHIL # BLD: 0.3 K/UL (ref 0–0.4)
EOSINOPHIL NFR BLD: 4 % (ref 0–7)
ERYTHROCYTE [DISTWIDTH] IN BLOOD BY AUTOMATED COUNT: 15 % (ref 11.5–14.5)
GLOBULIN SER CALC-MCNC: 3.7 G/DL (ref 2–4)
GLUCOSE BLD STRIP.AUTO-MCNC: 184 MG/DL (ref 65–100)
GLUCOSE BLD STRIP.AUTO-MCNC: 187 MG/DL (ref 65–100)
GLUCOSE BLD STRIP.AUTO-MCNC: 188 MG/DL (ref 65–100)
GLUCOSE BLD STRIP.AUTO-MCNC: 330 MG/DL (ref 65–100)
GLUCOSE SERPL-MCNC: 172 MG/DL (ref 65–100)
HCT VFR BLD AUTO: 29 % (ref 35–47)
HGB BLD-MCNC: 9.4 G/DL (ref 11.5–16)
INR PPP: 2.4 (ref 0.9–1.1)
LYMPHOCYTES # BLD AUTO: 33 % (ref 12–49)
LYMPHOCYTES # BLD: 2.2 K/UL (ref 0.8–3.5)
MCH RBC QN AUTO: 30.7 PG (ref 26–34)
MCHC RBC AUTO-ENTMCNC: 32.4 G/DL (ref 30–36.5)
MCV RBC AUTO: 94.8 FL (ref 80–99)
MONOCYTES # BLD: 0.7 K/UL (ref 0–1)
MONOCYTES NFR BLD AUTO: 10 % (ref 5–13)
NEUTS SEG # BLD: 3.6 K/UL (ref 1.8–8)
NEUTS SEG NFR BLD AUTO: 53 % (ref 32–75)
PLATELET # BLD AUTO: 282 K/UL (ref 150–400)
POTASSIUM SERPL-SCNC: 4.7 MMOL/L (ref 3.5–5.1)
PROT SERPL-MCNC: 6.7 G/DL (ref 6.4–8.2)
PROTHROMBIN TIME: 24.7 SEC (ref 9–11.1)
RBC # BLD AUTO: 3.06 M/UL (ref 3.8–5.2)
SERVICE CMNT-IMP: ABNORMAL
SODIUM SERPL-SCNC: 140 MMOL/L (ref 136–145)
TROPONIN I SERPL-MCNC: <0.04 NG/ML
TROPONIN I SERPL-MCNC: <0.04 NG/ML
VANCOMYCIN SERPL-MCNC: 22.7 UG/ML
WBC # BLD AUTO: 6.8 K/UL (ref 3.6–11)

## 2017-03-01 PROCEDURE — 97116 GAIT TRAINING THERAPY: CPT

## 2017-03-01 PROCEDURE — 82962 GLUCOSE BLOOD TEST: CPT

## 2017-03-01 PROCEDURE — 74011250637 HC RX REV CODE- 250/637: Performed by: FAMILY MEDICINE

## 2017-03-01 PROCEDURE — 85025 COMPLETE CBC W/AUTO DIFF WBC: CPT | Performed by: FAMILY MEDICINE

## 2017-03-01 PROCEDURE — 36415 COLL VENOUS BLD VENIPUNCTURE: CPT | Performed by: FAMILY MEDICINE

## 2017-03-01 PROCEDURE — 94640 AIRWAY INHALATION TREATMENT: CPT

## 2017-03-01 PROCEDURE — 74011636637 HC RX REV CODE- 636/637: Performed by: STUDENT IN AN ORGANIZED HEALTH CARE EDUCATION/TRAINING PROGRAM

## 2017-03-01 PROCEDURE — 94660 CPAP INITIATION&MGMT: CPT

## 2017-03-01 PROCEDURE — 74011250636 HC RX REV CODE- 250/636: Performed by: FAMILY MEDICINE

## 2017-03-01 PROCEDURE — 74011000250 HC RX REV CODE- 250: Performed by: STUDENT IN AN ORGANIZED HEALTH CARE EDUCATION/TRAINING PROGRAM

## 2017-03-01 PROCEDURE — 80202 ASSAY OF VANCOMYCIN: CPT | Performed by: FAMILY MEDICINE

## 2017-03-01 PROCEDURE — 65660000000 HC RM CCU STEPDOWN

## 2017-03-01 PROCEDURE — 77030012890

## 2017-03-01 PROCEDURE — 74011250636 HC RX REV CODE- 250/636: Performed by: STUDENT IN AN ORGANIZED HEALTH CARE EDUCATION/TRAINING PROGRAM

## 2017-03-01 PROCEDURE — 97535 SELF CARE MNGMENT TRAINING: CPT

## 2017-03-01 PROCEDURE — 74011636637 HC RX REV CODE- 636/637: Performed by: FAMILY MEDICINE

## 2017-03-01 PROCEDURE — 85610 PROTHROMBIN TIME: CPT | Performed by: FAMILY MEDICINE

## 2017-03-01 PROCEDURE — 97161 PT EVAL LOW COMPLEX 20 MIN: CPT

## 2017-03-01 PROCEDURE — 84484 ASSAY OF TROPONIN QUANT: CPT | Performed by: FAMILY MEDICINE

## 2017-03-01 PROCEDURE — 97165 OT EVAL LOW COMPLEX 30 MIN: CPT

## 2017-03-01 PROCEDURE — 80053 COMPREHEN METABOLIC PANEL: CPT | Performed by: FAMILY MEDICINE

## 2017-03-01 PROCEDURE — 74011000258 HC RX REV CODE- 258: Performed by: FAMILY MEDICINE

## 2017-03-01 RX ORDER — ACETAMINOPHEN 325 MG/1
650 TABLET ORAL
Status: DISCONTINUED | OUTPATIENT
Start: 2017-03-01 | End: 2017-03-02

## 2017-03-01 RX ORDER — OXYCODONE AND ACETAMINOPHEN 5; 325 MG/1; MG/1
1 TABLET ORAL
Status: DISCONTINUED | OUTPATIENT
Start: 2017-03-01 | End: 2017-03-10 | Stop reason: HOSPADM

## 2017-03-01 RX ORDER — WARFARIN 2 MG/1
2 TABLET ORAL ONCE
Status: COMPLETED | OUTPATIENT
Start: 2017-03-01 | End: 2017-03-01

## 2017-03-01 RX ORDER — POLYETHYLENE GLYCOL 3350 17 G/17G
17 POWDER, FOR SOLUTION ORAL
Status: DISCONTINUED | OUTPATIENT
Start: 2017-03-01 | End: 2017-03-10 | Stop reason: HOSPADM

## 2017-03-01 RX ORDER — IPRATROPIUM BROMIDE AND ALBUTEROL SULFATE 2.5; .5 MG/3ML; MG/3ML
3 SOLUTION RESPIRATORY (INHALATION)
Status: DISCONTINUED | OUTPATIENT
Start: 2017-03-01 | End: 2017-03-02

## 2017-03-01 RX ORDER — ISOSORBIDE MONONITRATE 30 MG/1
120 TABLET, EXTENDED RELEASE ORAL DAILY
Status: DISCONTINUED | OUTPATIENT
Start: 2017-03-02 | End: 2017-03-10 | Stop reason: HOSPADM

## 2017-03-01 RX ORDER — PREDNISONE 20 MG/1
40 TABLET ORAL
Status: DISCONTINUED | OUTPATIENT
Start: 2017-03-01 | End: 2017-03-04

## 2017-03-01 RX ORDER — PROCHLORPERAZINE EDISYLATE 5 MG/ML
5 INJECTION INTRAMUSCULAR; INTRAVENOUS ONCE
Status: COMPLETED | OUTPATIENT
Start: 2017-03-01 | End: 2017-03-01

## 2017-03-01 RX ORDER — PREDNISONE 10 MG/1
TABLET ORAL
Qty: 21 TAB | Refills: 0 | OUTPATIENT
Start: 2017-03-01 | End: 2017-03-02

## 2017-03-01 RX ADMIN — POLYETHYLENE GLYCOL 3350 17 G: 17 POWDER, FOR SOLUTION ORAL at 22:30

## 2017-03-01 RX ADMIN — INSULIN LISPRO 3 UNITS: 100 INJECTION, SOLUTION INTRAVENOUS; SUBCUTANEOUS at 17:08

## 2017-03-01 RX ADMIN — CEFEPIME HYDROCHLORIDE 2 G: 2 INJECTION, POWDER, FOR SOLUTION INTRAVENOUS at 15:08

## 2017-03-01 RX ADMIN — Medication 10 ML: at 14:43

## 2017-03-01 RX ADMIN — SUCRALFATE 1 G: 1 TABLET ORAL at 20:00

## 2017-03-01 RX ADMIN — CARVEDILOL 25 MG: 12.5 TABLET, FILM COATED ORAL at 08:30

## 2017-03-01 RX ADMIN — IPRATROPIUM BROMIDE AND ALBUTEROL SULFATE 3 ML: .5; 2.5 SOLUTION RESPIRATORY (INHALATION) at 13:15

## 2017-03-01 RX ADMIN — HYDRALAZINE HYDROCHLORIDE 25 MG: 25 TABLET, FILM COATED ORAL at 20:00

## 2017-03-01 RX ADMIN — PROCHLORPERAZINE EDISYLATE 5 MG: 5 INJECTION INTRAMUSCULAR; INTRAVENOUS at 07:29

## 2017-03-01 RX ADMIN — IPRATROPIUM BROMIDE AND ALBUTEROL SULFATE 3 ML: .5; 2.5 SOLUTION RESPIRATORY (INHALATION) at 16:09

## 2017-03-01 RX ADMIN — VANCOMYCIN HYDROCHLORIDE 2250 MG: 10 INJECTION, POWDER, LYOPHILIZED, FOR SOLUTION INTRAVENOUS at 17:09

## 2017-03-01 RX ADMIN — HYDRALAZINE HYDROCHLORIDE 25 MG: 25 TABLET, FILM COATED ORAL at 08:31

## 2017-03-01 RX ADMIN — SUCRALFATE 1 G: 1 TABLET ORAL at 10:33

## 2017-03-01 RX ADMIN — WARFARIN SODIUM 2 MG: 2 TABLET ORAL at 17:09

## 2017-03-01 RX ADMIN — OXYCODONE HYDROCHLORIDE AND ACETAMINOPHEN 1 TABLET: 5; 325 TABLET ORAL at 20:00

## 2017-03-01 RX ADMIN — PANTOPRAZOLE SODIUM 40 MG: 40 TABLET, DELAYED RELEASE ORAL at 06:24

## 2017-03-01 RX ADMIN — INSULIN LISPRO 3 UNITS: 100 INJECTION, SOLUTION INTRAVENOUS; SUBCUTANEOUS at 07:28

## 2017-03-01 RX ADMIN — BUMETANIDE 2 MG: 1 TABLET ORAL at 17:09

## 2017-03-01 RX ADMIN — ASPIRIN 81 MG CHEWABLE TABLET 81 MG: 81 TABLET CHEWABLE at 08:31

## 2017-03-01 RX ADMIN — CARVEDILOL 25 MG: 12.5 TABLET, FILM COATED ORAL at 17:09

## 2017-03-01 RX ADMIN — DOCUSATE SODIUM 100 MG: 100 CAPSULE ORAL at 20:00

## 2017-03-01 RX ADMIN — INSULIN LISPRO 5 UNITS: 100 INJECTION, SOLUTION INTRAVENOUS; SUBCUTANEOUS at 20:51

## 2017-03-01 RX ADMIN — INSULIN LISPRO 3 UNITS: 100 INJECTION, SOLUTION INTRAVENOUS; SUBCUTANEOUS at 12:08

## 2017-03-01 RX ADMIN — IPRATROPIUM BROMIDE AND ALBUTEROL SULFATE 3 ML: .5; 2.5 SOLUTION RESPIRATORY (INHALATION) at 23:23

## 2017-03-01 RX ADMIN — SUCRALFATE 1 G: 1 TABLET ORAL at 17:09

## 2017-03-01 RX ADMIN — HYDRALAZINE HYDROCHLORIDE 25 MG: 25 TABLET, FILM COATED ORAL at 15:08

## 2017-03-01 RX ADMIN — IPRATROPIUM BROMIDE AND ALBUTEROL SULFATE 3 ML: .5; 2.5 SOLUTION RESPIRATORY (INHALATION) at 19:32

## 2017-03-01 RX ADMIN — PREDNISONE 40 MG: 20 TABLET ORAL at 14:13

## 2017-03-01 RX ADMIN — ATORVASTATIN CALCIUM 80 MG: 20 TABLET, FILM COATED ORAL at 17:09

## 2017-03-01 RX ADMIN — BUMETANIDE 2 MG: 1 TABLET ORAL at 08:31

## 2017-03-01 NOTE — Clinical Note
Dr Mikey Durant, FYI----3/1/17, Care Coordination Note. This writer/NN out of office, 2/28/17, per Cardiology NN/Yajaira De Leon, patient kept PULM F/U appt, 2/28/17 and is being sent back to ED/further medical evaluation. 3/1/17, Per chart review, patient remains inpatient at [facility]. Will close current SEE episode and continue to monitor for discharge.  Thanks, Kevin Gaston

## 2017-03-01 NOTE — ED NOTES
Bedside and Verbal shift change report given to Justo Cabrera RN (oncoming nurse) by Antonella Hunt RN (offgoing nurse). Report included the following information SBAR, ED Summary, MAR, Recent Results, Med Rec Status and Cardiac Rhythm NSR.

## 2017-03-01 NOTE — PROGRESS NOTES
[facility] MEDICINE RESIDENCY PROGRAM  PROGRESS NOTE     2/28/2017  PCP: Skinny Becker,      Assessment/Plan:   Lorie Anderson is a 64 yo female PMH of HFpEF, HTN, CAD s/p stent, DVT, CKD4, COPD on 2LNC, GERD, IDDM admitted with HCAP.      HCAP: POA, SIRS 0/4. No signs of pneumonia. CXR unchanged from previous admission. Patient states that she had a URI last week with cough congestion and rhinorhea. We  Suspect it is the cause of the SOB causing a mild exacerbation of her COPD. Pt stable on baseline of 2LNC. Afebrile and no leukocytosis. QT prolongation noted on EKG so will avoid any exacerbating antibiotics. - continue to telemetry  - continue antibiotics until blood cultures are negative for 48 hours.   -Pharmacy to renally dose cefepime and vancomycin  -Consider pulmonology consult      Chest Pain: Troponin negative x3. EKG with QT prolongation. Pt has a hx of hiatal hernia and GERD which could be contributing to her pain. Also patient is clearly tender on palpation of the chest wall. Suspect from coughing.    -Continue imdur   - monitor pain. Tylenol prn for pain  -Avoid QT prolonging agents.      Hx of DVT: Prelim doppler negative for DVT. Doppler during previous admission showed stable right LLE DVT. Pt on coumadin and INR therapeutic at 2.3 on admission. V/q scan  Showed intermediate probability for PE. Patient not tachypneic or tachycardic. No further work-up. Patient INR is already therapeutic  -Continue with coumadin, pharmacy to dose         COPD exacerbation:  Mild POA. Stable on 2L home oxygen. Recently started on stiolto. -Continue Stiolto-pharmacy substitution allowed  -Continue duo-nebs  - start prednisone 40 mg qd  -Continue o2 supplementation as needed to maintain O2 sats > 88%     HFpEF: Stable Pro-BNP mildly elevated at 164. No signs of fluid overload on exam. EF from ECHO on 2/2/17 was 75% with G1DD. Pt recently here with a CHF exacerbation.  RAMESH has improved but continues with orthopnea. -Continue coreg 25mg daily and bumex 2mg daily   -Daily weights and strict I&O     HTN: BP stable for the most part but some episodes of elevated BP. On imdur, coreg, hydralazine and bumex at home.   -Continue coreg 25mg daily and bumex 2mg daily, hydralazine 25mg daily, imdur 120mg daily   -Monitor BP closely       CKD Stage 4: Cr on admission 3.89. Baseline ~3.5. This am creat : 3.92  -Continue to monitor Cr while on bumex  - consider discontinuing Bumex in creatinine keep rising.      IDDM: Glucose elevated at 204 on admission. On SSI 70/30 at home. t  -SSI for now  -POC glucose checks ACHS      CAD s/p stent: Initial troponin negative. -ASA 81mg daily   -F/U trop x 2      Constipation: Chronic problem.  -Continue Dulcolax       GERD: Stable. -Continue protonix 40mg daily  -Continue Carafate 1g QID      Hyperlipidemia: Stable  -Continue home Lovastatin 80mg daily       Morbid Obesity: BMI 51.2. >20lb weight gain in 6 days. -Monitor daily weights  -Encourage weight loss      FEN/GI - Cardic diet. Activity - Up ad harsha  DVT prophylaxis - Coumadin  GI prophylaxis - None indicated  Disposition - Plan to be be determined      CODE STATUS:  FULL CODE     Pt to be discussed with Attending physician)     Subjective:   Pt was seen and examined at bedside. Afebrile   and hemodynamically stable. H/o cough, congestion, rhinohrea last week. But the cough has improved. Denies chest pain, SOB, nausea, vomiting, abdominal pain, dizziness.      Objective:   Physical examination  Visit Vitals    /65    Pulse 65    Temp 98.6 °F (37 °C)    Resp 22    Ht 5' 10\" (1.778 m)    Wt 327 lb (148.3 kg)    SpO2 97%    BMI 46.92 kg/m2      Temp (24hrs), Av.6 °F (37 °C), Min:98.6 °F (37 °C), Max:98.6 °F (37 °C)     O2 Flow Rate (L/min): 2 l/min   O2 Device: Nasal cannula           Last 3 shifts:         General:   Alert, cooperative, no acute distress   Head:   Atraumatic   Eyes:   Conjunctivae clear ENT:  Oral mucosa normal   Neck:  Supple, trachea midline, no adenopathy   No JVD   Back:    No CVA tenderness    Chest wall:    tenderness+ no deformities    Lungs:   Clear to auscultation bilaterally    Heart:   Regular rhythm, no murmur   Abdomen:    Soft, non-tender   No masses or organomegaly    Extremities:  No edema or DVT signs   Pulses:  Symmetric all extremities   Skin:  Warm and dry    No rashes or lesions   Neurologic:  Oriented   No focal deficits         Data Review:     Recent Labs      02/28/17   1255   WBC  6.6   HGB  9.2*   HCT  30.2*   PLT  300     Recent Labs      02/28/17   1255   NA  141   K  4.5   CL  106   CO2  27   GLU  204*   BUN  38*   CREA  3.89*   CA  8.9   ALB  3.1*   TBILI  0.4   SGOT  13*   ALT  20   INR  2.3*     Medications reviewed  Current Facility-Administered Medications   Medication Dose Route Frequency    sodium chloride (NS) flush 5-10 mL  5-10 mL IntraVENous Q8H    sodium chloride (NS) flush 5-10 mL  5-10 mL IntraVENous PRN    sodium chloride (NS) flush 5-10 mL  5-10 mL IntraVENous Q8H    sodium chloride (NS) flush 5-10 mL  5-10 mL IntraVENous PRN    [START ON 3/1/2017] cefepime (MAXIPIME) 2 g in 0.9% sodium chloride (MBP/ADV) 100 mL  2 g IntraVENous Q24H    insulin lispro (HUMALOG) injection   SubCUTAneous AC&HS    glucose chewable tablet 16 g  4 Tab Oral PRN    dextrose (D50W) injection syrg 12.5-25 g  12.5-25 g IntraVENous PRN    glucagon (GLUCAGEN) injection 1 mg  1 mg IntraMUSCular PRN    docusate sodium (COLACE) capsule 100 mg  100 mg Oral QHS    hydrALAZINE (APRESOLINE) tablet 25 mg  25 mg Oral TID    bumetanide (BUMEX) tablet 2 mg  2 mg Oral BID    albuterol-ipratropium (DUO-NEB) 2.5 MG-0.5 MG/3 ML  3 mL Nebulization Q4H PRN    sucralfate (CARAFATE) tablet 1 g  1 g Oral AC&HS    carvedilol (COREG) tablet 25 mg  25 mg Oral BID WITH MEALS    [START ON 3/1/2017] pantoprazole (PROTONIX) tablet 40 mg  40 mg Oral ACB    [START ON 3/1/2017] aspirin chewable tablet 81 mg  81 mg Oral DAILY    atorvastatin (LIPITOR) tablet 80 mg  80 mg Oral QPM    technetium pentetate (Tc-DTPA) injection 40 millicurie  40 millicurie Inhalation RAD ONCE    Warfarin- Pharmacy Dosing   Other Rx Dosing/Monitoring    [START ON 3/1/2017] umeclidinium-vilanterol (ANORO ELLIPTA) 62.5 mcg- 25 mcg/inhalation  1 Puff Inhalation ACL    Vancomycin- Pharmacy Dosing per Level   Other Rx Dosing/Monitoring    [START ON 3/1/2017] Vancomycin- Random Level at 8 am on 3/1/17   Other ONCE     Current Outpatient Prescriptions   Medication Sig    polyethylene glycol (MIRALAX) 17 gram packet Take 17 g by mouth daily as needed.  warfarin (COUMADIN) 2 mg tablet Take 4 mg by mouth two (2) days a week. Monday and Friday    warfarin (COUMADIN) 2 mg tablet Take 2 mg by mouth five (5) days a week. Tues, Wed, Thurs, Sa, [place]    senna (SENNA) 8.6 mg tablet Take 2 Tabs by mouth nightly.  docusate sodium (COLACE) 100 mg capsule Take 100 mg by mouth nightly.  insulin aspart protamine/insulin aspart (NOVOLOG MIX 70-30) 100 unit/mL (70-30) injection by SubCUTAneous route. Uses sliding scale    tiotropium-olodaterol (STIOLTO RESPIMAT) 2.5-2.5 mcg/actuation mist Take 2 Puffs by inhalation Daily (before lunch). Uses daily at 11 am     oxyCODONE-acetaminophen (PERCOCET) 5-325 mg per tablet Take 1 Tab by mouth every eight (8) hours as needed for Pain. Max Daily Amount: 3 Tabs.  hydrALAZINE (APRESOLINE) 25 mg tablet Take 1 Tab by mouth three (3) times daily.  bumetanide (BUMEX) 2 mg tablet Take 1 Tab by mouth two (2) times a day.  linaclotide (LINZESS) 290 mcg cap capsule Take 1 Cap by mouth Daily (before breakfast) for 10 days.  ondansetron (ZOFRAN ODT) 4 mg disintegrating tablet Take 1 Tab by mouth every six (6) hours as needed for Nausea.  albuterol-ipratropium (DUONEB) 2.5 mg-0.5 mg/3 ml nebu 3 mL by Nebulization route daily as needed.  Takes about 4 days a week    sucralfate (Marisa Luster) 1 gram tablet Take 1 g by mouth Before breakfast, lunch, dinner and at bedtime.  fluticasone (FLONASE) 50 mcg/actuation nasal spray 2 Sprays by Both Nostrils route nightly as needed.  mupirocin (BACTROBAN) 2 % ointment Apply  to affected area two (2) times daily as needed (lesions on feet when needed). Ointment external two times daily to lesions on feet until healed - now using as needed    albuterol (PROVENTIL HFA, VENTOLIN HFA, PROAIR HFA) 90 mcg/actuation inhaler Take 2 Puffs by inhalation every four (4) hours as needed for Wheezing.  calcitRIOL (ROCALTROL) 0.25 mcg capsule Take 0.25 mcg by mouth two (2) days a week. Patient takes on Monday and Thursday    isosorbide mononitrate ER (IMDUR) 120 mg CR tablet TAKE 1 TABLET BY MOUTH EVERY DAY    carvedilol (COREG) 25 mg tablet TAKE 1 TABLET BY MOUTH TWICE A DAY    pantoprazole (PROTONIX) 40 mg tablet Take 1 Tab by mouth daily. Indications: GASTROESOPHAGEAL REFLUX    nitroglycerin (NITROSTAT) 0.3 mg SL tablet 1 Tab by SubLINGual route every five (5) minutes as needed for Chest Pain.  ergocalciferol (VITAMIN D2) 50,000 unit capsule Take 50,000 Units by mouth every Tuesday and Thursday.  aspirin 81 mg tablet Take 81 mg by mouth daily.  atorvastatin (LIPITOR) 80 mg tablet Take 80 mg by mouth every evening. Signed:   Elena Link MD   Resident, Family Medicine      Attending note: Attending note to follow. ..

## 2017-03-01 NOTE — PROGRESS NOTES
Problem: Mobility Impaired (Adult and Pediatric)  Goal: *Acute Goals and Plan of Care (Insert Text)  PHYSICAL THERAPY EVALUATION/DISCHARGE  Patient: Edgard Finney (52 y.o. female)  Date: 3/1/2017  Primary Diagnosis: HCAP (healthcare-associated pneumonia)        Precautions:      ASSESSMENT :  Based on the objective data described below, the patient presents with admitted due to PNA with symptoms of chest/LE pain with edema/L UE pain. Received in ER bed on 2LO2 of which pt has at all times at home. Pt has rollator and uses when she needs. Pt is independent with all and lives with spouse and 2 adult children. Gait of 150' tolerated with sats in the 90's on 2L, although c/o LE pain potentially due to poor venous return. Pt returned to bed with Mod I in NAD. If respiratory issues  Respiratory Therapy can follow as fall risk II. Skilled physical therapy is not indicated at this time. PLAN :  Discharge Recommendations: None  Further Equipment Recommendations for Discharge: none       SUBJECTIVE:   Patient stated I just want to go home.       OBJECTIVE DATA SUMMARY:   HISTORY:    Past Medical History:   Diagnosis Date     Sleep Apnea 2/16/2011    Angina, class III ([address] 8/9/2013    Aortic aneurysm ([address]      CAD (coronary Artery Disease) Native Artery 2/16/2011    CKD (chronic kidney disease) stage 4, GFR 15-29 ml/min ([address] 2/10/2017    Diabetic gastroparesis (HCC)      Diastolic heart failure ([address] 10/5/2012    Esophageal stricture 2012     dialted Dr. Maricarmen Nguyen G6PD deficiency ([address]       trait    GERD (gastroesophageal reflux disease)      Hypertensive Cardiovasc Dis Benign, No CHF 2/16/2011    ILD (interstitial lung disease) ([address]       open lung bx CJW 2010    OA (osteoarthritis)      Obesity 2/16/2011    Ovarian cancer ([address]       cervical and uterine    Rheumatoid arteritis ([address]      T2DM (type 2 diabetes mellitus) ([address] 8/9/2013    Tobacco use disorder 3/21/2012    [facility]      Vitamin D deficiency 8/9/2013     Past Surgical History:   Procedure Laterality Date    CARDIAC SURG PROCEDURE UNLIST         stents    COLONOSCOPY N/A 6/24/2016     COLONOSCOPY performed by Marisol Martino MD at [facility] HX APPENDECTOMY        HX CARPAL TUNNEL RELEASE         bilateral    HX CHOLECYSTECTOMY        HX HERNIA REPAIR        HX HYSTERECTOMY        HX ORTHOPAEDIC   11/12/12     right knee replacement    HX TONSIL AND ADENOIDECTOMY        UPPER GI ENDOSCOPY,GEMMAATMARNIE W GUIDE   6/24/2016           Prior Level of Function/Home Situation: lives with spouse; Mod I; home O2  Personal factors and/or comorbidities impacting plan of care:      Home Situation  Home Environment: Private residence  # Steps to Enter: 2  One/Two Story Residence: One story  Living Alone: No  Support Systems: Spouse/Significant Other/Partner, Child(mera)  Patient Expects to be Discharged to[de-identified] Private residence  Current DME Used/Available at Home: Shower chair, Walker, rollator, Walker, rolling, Nanine Likes, straight, Commode, bedside  Tub or Shower Type: Shower     EXAMINATION/PRESENTATION/DECISION MAKING:   Critical Behavior:  Neurologic State: Alert  Orientation Level: Oriented X4  Cognition: Appropriate decision making, Follows commands  Safety/Judgement: Awareness of environment  Skin:  IV; O2  Strength:    Strength: Generally decreased, functional                    Tone & Sensation:   Tone: Normal              Sensation: Intact              Range Of Motion:  AROM: Within functional limits                       Coordination:  Coordination: Generally decreased, functional     Functional Mobility:  Bed Mobility:        Sit to Supine: Modified independent  Scooting: Modified independent  Transfers:  Sit to Stand: Modified independent  Stand to Sit: Modified independent                       Balance:   Sitting: Intact  Standing: Intact; With support  Ambulation/Gait Training: Functional Measure:  Barthel Index:      Bathin  Bladder: 10  Bowels: 10  Groomin  Dressing: 10  Feeding: 10  Mobility: 10  Stairs: 5  Toilet Use: 10  Transfer (Bed to Chair and Back): 15  Total: 90         Barthel and G-code impairment scale:  Percentage of impairment CH  0% CI  1-19% CJ  20-39% CK  40-59% CL  60-79% CM  80-99% CN  100%   Barthel Score 0-100 100 99-80 79-60 59-40 20-39 1-19    0   Barthel Score 0-20 20 17-19 13-16 9-12 5-8 1-4 0      The Barthel ADL Index: Guidelines  1. The index should be used as a record of what a patient does, not as a record of what a patient could do. 2. The main aim is to establish degree of independence from any help, physical or verbal, however minor and for whatever reason. 3. The need for supervision renders the patient not independent. 4. A patient's performance should be established using the best available evidence. Asking the patient, friends/relatives and nurses are the usual sources, but direct observation and common sense are also important. However direct testing is not needed. 5. Usually the patient's performance over the preceding 24-48 hours is important, but occasionally longer periods will be relevant. 6. Middle categories imply that the patient supplies over 50 per cent of the effort. 7. Use of aids to be independent is allowed. Loree Santillan., Barthel, DBonitaW. (0640). Functional evaluation: the Barthel Index. [address] [facility] (14)2. Luke Restrepo mary Jose Armando, J.J.M.JOSE RAMON, Lázaro Ndiaye., [facility]., [facility], [address] [place] [address] (). Measuring the change indisability after inpatient rehabilitation; comparison of the responsiveness of the Barthel Index and Functional [place] Measure. Journal of Neurology, Neurosurgery, and Psychiatry, 66(4), 958-871. Loyda Lea N.KODAK.A, JENIFFER Smith, & Sina Rajput MLISA. (2004.) Assessment of post-stroke quality of life in cost-effectiveness studies:  The usefulness of the Barthel Index and the EuroQoL-5D. Quality of Life Research, 13, 174-48         G codes: In compliance with CMSs Claims Based Outcome Reporting, the following G-code set was chosen for this patient based on their primary functional limitation being treated: The outcome measure chosen to determine the severity of the functional limitation was the barthel with a score of 90/100 which was correlated with the impairment scale. · Mobility - Walking and Moving Around:               - CURRENT STATUS:    CI - 1%-19% impaired, limited or restricted               - GOAL STATUS:           CI - 1%-19% impaired, limited or restricted               - D/C STATUS:                       CI - 1%-19% impaired, limited or restricted         Physical Therapy Evaluation Charge Determination   History Examination Presentation Decision-Making   LOW Complexity : Zero comorbidities / personal factors that will impact the outcome / POC LOW Complexity : 1-2 Standardized tests and measures addressing body structure, function, activity limitation and / or participation in recreation  LOW Complexity : Stable, uncomplicated  Other outcome measures barthel  LOW       Based on the above components, the patient evaluation is determined to be of the following complexity level: LOW               Activity Tolerance:   good  Please refer to the flowsheet for vital signs taken during this treatment. After treatment:   [ ]   Patient left in no apparent distress sitting up in chair  [X]   Patient left in no apparent distress in bed  [X]   Call bell left within reach  [X]   Nursing notified  [ ]   Caregiver present  [ ]   Bed alarm activated      COMMUNICATION/EDUCATION:   Communication/Collaboration:  [X]   Fall prevention education was provided and the patient/caregiver indicated understanding. [X]   Patient/family have participated as able and agree with findings and recommendations.   [ ]   Patient is unable to participate in plan of care at this time.   Findings and recommendations were discussed with: Registered Nurse and OT     Thank you for this referral.  Breanne Onofre, PT   Time Calculation: 20 mins

## 2017-03-01 NOTE — PROGRESS NOTES
[facility] Dosing Services: Antimicrobial Stewardship Daily Doc    Consult for Vancomycin by Dr. Darin Null  Pharmacist reviewed antibiotic appropriateness for 63y.o. year old female for indication of HCAP  Day of Therapy 2    Ht Readings from Last 1 Encounters:   02/28/17 177.8 cm (70\")        Wt Readings from Last 1 Encounters:   02/28/17 148.3 kg (327 lb)      Vancomycin therapy:  Current maintenance dose: based on levels due to ROSEANN  Dose calculated to approximate a therapeutic trough of 15-20 mcg/mL. Random level this am = 22.7 mcg/ml at ~ 15.5 hours post 2500 mg dose   Plan for level / Adjustment in Therapy: SCr increased from 3.89 to 3.92 this am.  Redose with 2250 mg at 1800 tonight, assess another random at noon tomorrow  Dose administration notes:   Doses given appropriately as scheduled  Other Antimicrobial   (not dosed by pharmacist) Cefepime 2gm ivpb q24h   Cultures 2/28 Blood in process   Serum Creatinine Lab Results   Component Value Date/Time    Creatinine 3.92 03/01/2017 04:31 AM    Creatinine (POC) 2.1 07/21/2013 09:51 PM         Creatinine Clearance Estimated Creatinine Clearance: 24.5 mL/min (based on Cr of 3.92). Temp Temp: 98.6 °F (37 °C)       WBC Lab Results   Component Value Date/Time    WBC 6.8 03/01/2017 04:31 AM        H/H Lab Results   Component Value Date/Time    HGB 9.4 03/01/2017 04:31 AM        Platelets    Lab Results   Component Value Date/Time    PLATELET 372 72/03/8535 04:31 AM          Thank you,    Melita Davidson.  Pollo Daniel

## 2017-03-01 NOTE — PROGRESS NOTES
[facility] Dosing Services: 2/28/17    Consult for Vancomycin by Dr. Ivanna Cerna  Pharmacist reviewed antibiotic appropriateness for  63y.o. year old ,female for indication of HCAP  Day of Therapy 1    Current Antimicrobial Therapy:  Vancomycin 2.5gm ivpb x1 dose in ER. Future doses will be based on levels due to poor kidney function. Previous Dosing Regimen N/a   Other Current Antibiotics Cefepime 2gm ivpb q24h   Serum Creatinine/ BUN Lab Results   Component Value Date/Time    Creatinine 3.89 02/28/2017 12:55 PM    Creatinine (POC) 2.1 07/21/2013 09:51 PM       Creatinine Clearance Estimated Creatinine Clearance: 24.7 mL/min (based on Cr of 3.89).    Temp 98.6 °F (37 °C)    WBC Lab Results   Component Value Date/Time    WBC 6.6 02/28/2017 12:55 PM      H/H Lab Results   Component Value Date/Time    HGB 9.2 02/28/2017 12:55 PM      Platelets Lab Results   Component Value Date/Time    PLATELET 990 12/49/0280 12:55 PM        Significant Cultures : blood cx's pending      Pharmacist Corinne Ponce, PHARMD  Contact information: [phone]

## 2017-03-01 NOTE — PROGRESS NOTES
Occupational Therapy EVALUATION/discharge  Patient: Romain Joseph (43 y.o. female)  Date: 3/1/2017  Primary Diagnosis: HCAP (healthcare-associated pneumonia)        Precautions:        ASSESSMENT:   Based on the objective data described below, the patient presents with modified independence to independent for basic ADLs. Patient noted with O2 sats between 93-98% on 2L O2 with activity. Patient wears O2 at home and familiar with use and good awareness of O2 tubing with ambulation/activity. Patient reports she is able to complete ADL task at home but does report SOB at baseline. Further skilled acute occupational therapy is not indicated at this time. Discharge Recommendations: None  Further Equipment Recommendations for Discharge: none noted      SUBJECTIVE:   Patient stated Every time I see the doctor he sends me here.     OBJECTIVE DATA SUMMARY:   HISTORY:   Past Medical History:   Diagnosis Date     Sleep Apnea 2/16/2011    Angina, class III ([address] 8/9/2013    Aortic aneurysm ([address]     CAD (coronary Artery Disease) Native Artery 2/16/2011    CKD (chronic kidney disease) stage 4, GFR 15-29 ml/min ([address] 2/10/2017    Diabetic gastroparesis (HCC)     Diastolic heart failure ([address] 10/5/2012    Esophageal stricture 2012    dialted Dr. Faustino Angelucci G6PD deficiency ([address]     trait    GERD (gastroesophageal reflux disease)     Hypertensive Cardiovasc Dis Benign, No CHF 2/16/2011    ILD (interstitial lung disease) ([address]     open lung bx CJW 2010    OA (osteoarthritis)     Obesity 2/16/2011    Ovarian cancer ([address]     cervical and uterine    Rheumatoid arteritis ([address]     T2DM (type 2 diabetes mellitus) ([address] 8/9/2013    Tobacco use disorder 3/21/2012    Uterine cervix cancer ([address]     Vitamin D deficiency 8/9/2013     Past Surgical History:   Procedure Laterality Date    CARDIAC SURG PROCEDURE UNLIST      stents    COLONOSCOPY N/A 6/24/2016    COLONOSCOPY performed by Palak Santos MD at [facility]    HX APPENDECTOMY      HX CARPAL TUNNEL RELEASE      bilateral    HX CHOLECYSTECTOMY      HX HERNIA REPAIR      HX HYSTERECTOMY      HX ORTHOPAEDIC  11/12/12    right knee replacement    HX TONSIL AND ADENOIDECTOMY      UPPER GI ENDOSCOPY,VINOD PINZON  6/24/2016            Prior Level of Function/Home Situation: patient modified independent with ADL routine, uses O2 at home. Expanded or extensive additional review of patient history:     Home Situation  Home Environment: Private residence  # Steps to Enter: 2  One/Two Story Residence: One story  Living Alone: No  Support Systems: Spouse/Significant Other/Partner, Child(mear)  Patient Expects to be Discharged to[de-identified] Private residence  Current DME Used/Available at Home: Shower chair, Walker, rollator, Walker, rolling, Dietra Hives, straight, Commode, bedside  Tub or Shower Type: Shower  [x]  Right hand dominant   []  Left hand dominant    EXAMINATION OF PERFORMANCE DEFICITS:  Cognitive/Behavioral Status:  Neurologic State: Alert  Orientation Level: Oriented X4  Cognition: Appropriate decision making; Follows commands  Perception: Appears intact  Perseveration: No perseveration noted  Safety/Judgement: Awareness of environment  Skin: intact as seen  Edema: bilateral LEs  Vision/Perceptual:         Range of Motion:  AROM: Within functional limits     Strength:  Strength: Generally decreased, functional     Coordination:  Coordination: Generally decreased, functional  Fine Motor Skills-Upper: Left Intact; Right Intact    Gross Motor Skills-Upper: Left Intact; Right Intact  Tone & Sensation:  Tone: Normal  Sensation: Intact        Balance:  Sitting: Intact  Standing: Intact; With support    Functional Mobility and Transfers for ADLs:  Bed Mobility:  Sit to Supine: Modified independent  Scooting: Modified independent    Transfers:  Sit to Stand: Modified independent  Stand to Sit: Modified independent  Toilet Transfer : Supervision    ADL Assessment:  Feeding: Independent    Oral Facial Hygiene/Grooming: Modified Independent    Bathing: Modified independent    Upper Body Dressing: Modified independent    Lower Body Dressing: Modified independent    Toileting: Modified independent        ADL Intervention and task modifications:       Cognitive Retraining  Safety/Judgement: Awareness of environment    Functional Measure:  Barthel Index:    Bathin  Bladder: 10  Bowels: 10  Groomin  Dressing: 10  Feeding: 10  Mobility: 10  Stairs: 5  Toilet Use: 10  Transfer (Bed to Chair and Back): 15  Total: 90       Barthel and G-code impairment scale:  Percentage of impairment CH  0% CI  1-19% CJ  20-39% CK  40-59% CL  60-79% CM  80-99% CN  100%   Barthel Score 0-100 100 99-80 79-60 59-40 20-39 1-19   0   Barthel Score 0-20 20 17-19 13-16 9-12 5-8 1-4 0      The Barthel ADL Index: Guidelines  1. The index should be used as a record of what a patient does, not as a record of what a patient could do. 2. The main aim is to establish degree of independence from any help, physical or verbal, however minor and for whatever reason. 3. The need for supervision renders the patient not independent. 4. A patient's performance should be established using the best available evidence. Asking the patient, friends/relatives and nurses are the usual sources, but direct observation and common sense are also important. However direct testing is not needed. 5. Usually the patient's performance over the preceding 24-48 hours is important, but occasionally longer periods will be relevant. 6. Middle categories imply that the patient supplies over 50 per cent of the effort. 7. Use of aids to be independent is allowed. Magdi Ortez., Barthel, D.W. (5153). Functional evaluation: the Barthel Index. [address] [facility] (14)2. Makenna Hampton mary YOVANNY Suárez, Radha Joyner.Ines., Maria R, [address] ().  Measuring the change indisability after inpatient rehabilitation; comparison of the responsiveness of the Barthel Index and Functional [place] Measure. Journal of Neurology, Neurosurgery, and Psychiatry, 66(4), 383-088. HANNAH Yañez.LYNN, JENIFFER Smith, & Ashia Veras M.A. (2004.) Assessment of post-stroke quality of life in cost-effectiveness studies: The usefulness of the Barthel Index and the EuroQoL-5D. Quality of Life Research, 13, 197-85         G codes: In compliance with CMSs Claims Based Outcome Reporting, the following G-code set was chosen for this patient based on their primary functional limitation being treated: The outcome measure chosen to determine the severity of the functional limitation was the Barthel Index with a score of 90/100 which was correlated with the impairment scale. ? Self Care:     - CURRENT STATUS: CI - 1%-19% impaired, limited or restricted    - GOAL STATUS: CI - 1%-19% impaired, limited or restricted    - D/C STATUS:  CI - 1%-19% impaired, limited or restricted     Occupational Therapy Evaluation Charge Determination   History Examination Decision-Making   LOW Complexity : Brief history review  LOW Complexity : 1-3 performance deficits relating to physical, cognitive , or psychosocial skils that result in activity limitations and / or participation restrictions  LOW Complexity : No comorbidities that affect functional and no verbal or physical assistance needed to complete eval tasks       Based on the above components, the patient evaluation is determined to be of the following complexity level: LOW   Pain:                    Activity Tolerance:   VSS  Please refer to the flowsheet for vital signs taken during this treatment.   After treatment:   []  Patient left in no apparent distress sitting up in chair  [x]  Patient left in no apparent distress in bed  [x]  Call bell left within reach  []  Nursing notified  [x]  Caregiver present  []  Bed alarm activated    COMMUNICATION/EDUCATION:   Communication/Collaboration:  [x]      Home safety education was provided and the patient/caregiver indicated understanding. [x]      Patient/family have participated as able and agree with findings and recommendations. []      Patient is unable to participate in plan of care at this time.   Findings and recommendations were discussed with: Physical Therapist and Registered Nurse    KARLY Valle/L  Time Calculation: 14 mins

## 2017-03-01 NOTE — PROGRESS NOTES
3/1/2017   CARE MANAGEMENT NOTE: CM is following pt for READMISSION. EMR reviewed. Per chart hx, pt was admitted to [facility] from 2/2/17 - 2/10/17. She returned home without home services. Pt was readmitted on 2/16/17 - 2/20/17 with dx of CHF. Initial Assessment:  Reportedly, pt resides with her  in a one story home. PTA, pt was ambulatory, indepn with ADLs, and drives. She is unemployed and on disability. Pt has RX drug coverage and she uses [facility] pharmacy on [address] Pt does not have home healthcare currently although she states that she may benefit from skilled nursing visits (she does not have a preference of agency). DME in the home includes a cane, rolling walker, nebulizer, Cpap, BSC, shower chair, and oxygen. Pt reports that she is changing from [facility] to [address] [place] [address] as the former company does not have tanks that last over 2 hrs. PCP - none. She was given a list of area providers. During previous hospitalization [facility] provided a rollator for home use. Currently, she only has specialists for medical f/u. Readmission Assessment: Pt presents to the ER with cc: SOB, and right shoulder pain who was sent from pulmonologist.  Pt will be readmitted for further medical management and dx of HCAP. Action Plan: CM will follow.   Kamilah

## 2017-03-01 NOTE — PROGRESS NOTES
SHIFT REPORT:  1900- Bedside shift change report given to Boaz Moreno RN (oncoming nurse) by Anna Coleman (offgoing nurse). Report included the following information SBAR, Kardex, Procedure Summary, Intake/Output, Recent Results and Cardiac Rhythm. SHIFT SUMMARY:  1920-contacted Dr. Augusta Wild; will order Pt Percocet & Imdur  0400-venous blood drawn after discard for AM labs. ; up to BR to void, standing weight done; back on CPAP w 2l O2  0650-new orders noted;  Pt does not want Tylenol and has told MD how she feels; NS up to infuse 180cc/hr via L AC; pt very frustrated and feels MD's are not listening to her. END OF SHIFT REPORT:  0715-Bedside shift change report given to Jessie Rodriguez RN (oncoming nurse) by Boaz Moreno RN (offgoing nurse). Report included the following information SBAR, Kardex, Procedure Summary, Intake/Output, Recent Results and Cardiac Rhythm .

## 2017-03-01 NOTE — PROGRESS NOTES
Bedside and Verbal shift change report given to Daniel Olivera RN (oncoming nurse) by Niels Polk RN (offgoing nurse). Report included the following information SBAR, ED Summary, MAR, Recent Results and Cardiac Rhythm NSR.

## 2017-03-01 NOTE — ED NOTES
TRANSFER - OUT REPORT:    Verbal report given to Amarjit Reinoso on Hailey Valera  being transferred to [facility](unit) for routine progression of care       Report consisted of patients Situation, Background, Assessment and   Recommendations(SBAR). Information from the following report(s) SBAR, ED Summary, [facility] and Recent Results was reviewed with the receiving nurse. Lines:   Peripheral IV 02/28/17 Right Antecubital (Active)   Site Assessment Clean, dry, & intact 2/28/2017 12:52 PM   Phlebitis Assessment 0 2/28/2017 12:52 PM   Infiltration Assessment 0 2/28/2017 12:52 PM   Dressing Status Clean, dry, & intact 2/28/2017 12:52 PM   Dressing Type Transparent 2/28/2017 12:52 PM       Peripheral IV 03/01/17 Left Antecubital (Active)   Site Assessment Clean, dry, & intact 3/1/2017  4:35 AM   Phlebitis Assessment 0 3/1/2017  4:35 AM   Infiltration Assessment 0 3/1/2017  4:35 AM   Dressing Status Clean, dry, & intact 3/1/2017  4:35 AM   Dressing Type Transparent 3/1/2017  4:35 AM   Hub Color/Line Status Pink 3/1/2017  4:35 AM        Opportunity for questions and clarification was provided.       Patient transported with:   Monitor  O2 @ 2 L NC liters  Registered Nurse

## 2017-03-01 NOTE — PROGRESS NOTES
[facility] Dosing Services: 3/1/2017    Consult for Warfarin Dosing by Pharmacy by Dr. Justin Levi provided for this 63 y.o.  female , for indication of Venous Thrombosis. PTA: 4mg on Mon, Fri, 2mg rest of the week  Dose to achieve an INR goal of 2-3    Order entered for  Warfarin  2 (mg) ordered to be given today at 18:00. Significant drug interactions: none  Previous dose given 4 mg pta   PT/INR Lab Results   Component Value Date/Time    INR 2.4 03/01/2017 04:31 AM      Platelets Lab Results   Component Value Date/Time    PLATELET 513 74/70/0312 04:31 AM      H/H Lab Results   Component Value Date/Time    HGB 9.4 03/01/2017 04:31 AM        Pharmacy to follow daily and will provide subsequent Warfarin dosing based on clinical status.   Toro Fuentes [facility])  Contact information [phone]

## 2017-03-01 NOTE — ED NOTES
Verbal shift change report given to Veronica Florez RN (oncoming nurse) by Ezequiel Burris RN (offgoing nurse). Report included the following information SBAR, Kardex, ED Summary, [place] and Recent Results.

## 2017-03-01 NOTE — PROGRESS NOTES
3/1/17, Care Coordination Note. This writer/NN out of office, 2/28/17, per Cardiology NN/Yajaira Anton, patient kept PULM F/U appt, 2/28/17 and is being sent back to ED/further medical evaluation. 3/1/17, Per chart review, patient remains inpatient at [facility]. Will close current SEE episode and continue to monitor for discharge. See previous NN/patient outreach documentation:  S/P [facility], 2/16/17 - 2/20/17 related to CHF Exacerbation and S/P 2/2/17 - 2/10/17 at [facility] related to Resp. Failure/Interstitial Lung Disease, DVT, Diastolic HF, PULM HTN and CKD. Per chart review, patient kept F/U appt with Dr Efren Linton, 2/23/17, next scheduled appt, 3/9/17. Patient also keeping F/U appts with PULM and Cardiology. 2/16/17, Patient in office to establish PCP with Dr Efren Linton, reports recent [facility] hospitalization and ED visit. Dr Efren Linton contacted this writer/NN with concerns of 30 pound weight gain since discharge, INR at 5. 4(recent DVT on coumadin), Diabetes, CHF,and CKD, recommends patient return to [facility] for further medical evaluation

## 2017-03-01 NOTE — PROGRESS NOTES
[facility] Dosing Services: 2/28/17    Consult for Warfarin Dosing by Pharmacy by Dr. Damaris Rodriguez provided for this 63 y.o.  female , for indication of Venous Thrombosis. PTA: 4mg on Mon, Fri, 2mg rest of the week  Dose to achieve an INR goal of 2-3    Already taken 2mg this morning (2/28/17)    Significant drug interactions: none  Previous dose given 2mg (PTA 2/28/17)   PT/INR Lab Results   Component Value Date/Time    INR 2.3 02/28/2017 12:55 PM      Platelets Lab Results   Component Value Date/Time    PLATELET 680 69/40/3839 12:55 PM      H/H Lab Results   Component Value Date/Time    HGB 9.2 02/28/2017 12:55 PM        Pharmacy to follow daily and will provide subsequent Warfarin dosing based on clinical status. DEBORAH Kelley  [facility] information [phone]

## 2017-03-01 NOTE — PROGRESS NOTES
Spoke with the nurse on the floor- [facility]- she is just getting to room 316- her nurse is admitting her right now. I asked for them to relay to her that Ms. Maria L Griffith was in the pulmonary doctors office yesterday- Dr. Mark Falcon and Dr. Mark Falcon sent her to the ED for her breathing- she said \"it is her heart and not her lungs\" per Ms. Mcclellanddows when she called me yesterday. I reviewed chart and saw that PT and OT cleared her for no services needed. Relayed that this is her 3rd hospital stay within 30 days. Reached out to the cardiology NP to update her on above.

## 2017-03-01 NOTE — PROGRESS NOTES
Spoke with Dr. Nlel Price, [facility] in reference to patient's request for her Imdur 120 mg which she takes daily and has not received and pain medication, which she says tylenol is not sufficient. Patient states she received a dose of percocet in the ER. Awaiting new orders. 1905  Bedside and Verbal shift change report given to Lali Landa RN (oncoming nurse) by Davida Castleman, RN (offgoing nurse). Report included the following information SBAR, ER Summary, and Cardiac Rhythm NSR.

## 2017-03-02 ENCOUNTER — APPOINTMENT (OUTPATIENT)
Dept: CT IMAGING | Age: 62
DRG: 190 | End: 2017-03-02
Attending: INTERNAL MEDICINE
Payer: MEDICARE

## 2017-03-02 PROBLEM — I50.9 CHF EXACERBATION (HCC): Status: RESOLVED | Noted: 2017-02-16 | Resolved: 2017-03-02

## 2017-03-02 LAB
ALBUMIN SERPL BCP-MCNC: 2.9 G/DL (ref 3.5–5)
ALBUMIN/GLOB SERPL: 0.7 {RATIO} (ref 1.1–2.2)
ALP SERPL-CCNC: 79 U/L (ref 45–117)
ALT SERPL-CCNC: 20 U/L (ref 12–78)
ANION GAP BLD CALC-SCNC: 13 MMOL/L (ref 5–15)
AST SERPL W P-5'-P-CCNC: 7 U/L (ref 15–37)
BASOPHILS # BLD AUTO: 0 K/UL (ref 0–0.1)
BASOPHILS # BLD: 0 % (ref 0–1)
BILIRUB SERPL-MCNC: 0.4 MG/DL (ref 0.2–1)
BNP SERPL-MCNC: 237 PG/ML (ref 0–125)
BUN SERPL-MCNC: 47 MG/DL (ref 6–20)
BUN/CREAT SERPL: 11 (ref 12–20)
CALCIUM SERPL-MCNC: 8.1 MG/DL (ref 8.5–10.1)
CHLORIDE SERPL-SCNC: 100 MMOL/L (ref 97–108)
CO2 SERPL-SCNC: 23 MMOL/L (ref 21–32)
CREAT SERPL-MCNC: 4.24 MG/DL (ref 0.55–1.02)
EOSINOPHIL # BLD: 0 K/UL (ref 0–0.4)
EOSINOPHIL NFR BLD: 0 % (ref 0–7)
ERYTHROCYTE [DISTWIDTH] IN BLOOD BY AUTOMATED COUNT: 14.6 % (ref 11.5–14.5)
GLOBULIN SER CALC-MCNC: 4 G/DL (ref 2–4)
GLUCOSE BLD STRIP.AUTO-MCNC: 339 MG/DL (ref 65–100)
GLUCOSE BLD STRIP.AUTO-MCNC: 390 MG/DL (ref 65–100)
GLUCOSE BLD STRIP.AUTO-MCNC: 409 MG/DL (ref 65–100)
GLUCOSE BLD STRIP.AUTO-MCNC: 504 MG/DL (ref 65–100)
GLUCOSE SERPL-MCNC: 425 MG/DL (ref 65–100)
HCT VFR BLD AUTO: 28.9 % (ref 35–47)
HGB BLD-MCNC: 9.5 G/DL (ref 11.5–16)
INR PPP: 2.3 (ref 0.9–1.1)
LYMPHOCYTES # BLD AUTO: 17 % (ref 12–49)
LYMPHOCYTES # BLD: 1 K/UL (ref 0.8–3.5)
MCH RBC QN AUTO: 30.3 PG (ref 26–34)
MCHC RBC AUTO-ENTMCNC: 32.9 G/DL (ref 30–36.5)
MCV RBC AUTO: 92 FL (ref 80–99)
MONOCYTES # BLD: 0.3 K/UL (ref 0–1)
MONOCYTES NFR BLD AUTO: 4 % (ref 5–13)
NEUTS SEG # BLD: 4.8 K/UL (ref 1.8–8)
NEUTS SEG NFR BLD AUTO: 79 % (ref 32–75)
PLATELET # BLD AUTO: 327 K/UL (ref 150–400)
POTASSIUM SERPL-SCNC: 4.9 MMOL/L (ref 3.5–5.1)
PROT SERPL-MCNC: 6.9 G/DL (ref 6.4–8.2)
PROTHROMBIN TIME: 23.5 SEC (ref 9–11.1)
RBC # BLD AUTO: 3.14 M/UL (ref 3.8–5.2)
RHEUMATOID FACT SERPL-ACNC: <10 IU/ML
SERVICE CMNT-IMP: ABNORMAL
SODIUM SERPL-SCNC: 136 MMOL/L (ref 136–145)
VANCOMYCIN SERPL-MCNC: 29.2 UG/ML
WBC # BLD AUTO: 6.1 K/UL (ref 3.6–11)

## 2017-03-02 PROCEDURE — 74011636637 HC RX REV CODE- 636/637: Performed by: INTERNAL MEDICINE

## 2017-03-02 PROCEDURE — 85610 PROTHROMBIN TIME: CPT | Performed by: FAMILY MEDICINE

## 2017-03-02 PROCEDURE — 74011250636 HC RX REV CODE- 250/636: Performed by: INTERNAL MEDICINE

## 2017-03-02 PROCEDURE — 74011000250 HC RX REV CODE- 250: Performed by: NURSE PRACTITIONER

## 2017-03-02 PROCEDURE — 74011250637 HC RX REV CODE- 250/637: Performed by: FAMILY MEDICINE

## 2017-03-02 PROCEDURE — 83516 IMMUNOASSAY NONANTIBODY: CPT | Performed by: INTERNAL MEDICINE

## 2017-03-02 PROCEDURE — 94660 CPAP INITIATION&MGMT: CPT

## 2017-03-02 PROCEDURE — 83520 IMMUNOASSAY QUANT NOS NONAB: CPT | Performed by: INTERNAL MEDICINE

## 2017-03-02 PROCEDURE — 36415 COLL VENOUS BLD VENIPUNCTURE: CPT | Performed by: FAMILY MEDICINE

## 2017-03-02 PROCEDURE — 82962 GLUCOSE BLOOD TEST: CPT

## 2017-03-02 PROCEDURE — 74011636637 HC RX REV CODE- 636/637: Performed by: FAMILY MEDICINE

## 2017-03-02 PROCEDURE — 77010033678 HC OXYGEN DAILY

## 2017-03-02 PROCEDURE — 85025 COMPLETE CBC W/AUTO DIFF WBC: CPT | Performed by: FAMILY MEDICINE

## 2017-03-02 PROCEDURE — 94640 AIRWAY INHALATION TREATMENT: CPT

## 2017-03-02 PROCEDURE — 86803 HEPATITIS C AB TEST: CPT | Performed by: INTERNAL MEDICINE

## 2017-03-02 PROCEDURE — 71250 CT THORAX DX C-: CPT

## 2017-03-02 PROCEDURE — A9270 NON-COVERED ITEM OR SERVICE: HCPCS | Performed by: INTERNAL MEDICINE

## 2017-03-02 PROCEDURE — 86038 ANTINUCLEAR ANTIBODIES: CPT | Performed by: INTERNAL MEDICINE

## 2017-03-02 PROCEDURE — 65660000000 HC RM CCU STEPDOWN

## 2017-03-02 PROCEDURE — 74011250636 HC RX REV CODE- 250/636: Performed by: STUDENT IN AN ORGANIZED HEALTH CARE EDUCATION/TRAINING PROGRAM

## 2017-03-02 PROCEDURE — 83880 ASSAY OF NATRIURETIC PEPTIDE: CPT | Performed by: NURSE PRACTITIONER

## 2017-03-02 PROCEDURE — 86431 RHEUMATOID FACTOR QUANT: CPT | Performed by: INTERNAL MEDICINE

## 2017-03-02 PROCEDURE — 86160 COMPLEMENT ANTIGEN: CPT | Performed by: INTERNAL MEDICINE

## 2017-03-02 PROCEDURE — 93308 TTE F-UP OR LMTD: CPT

## 2017-03-02 PROCEDURE — 74011636637 HC RX REV CODE- 636/637: Performed by: STUDENT IN AN ORGANIZED HEALTH CARE EDUCATION/TRAINING PROGRAM

## 2017-03-02 PROCEDURE — 87340 HEPATITIS B SURFACE AG IA: CPT | Performed by: INTERNAL MEDICINE

## 2017-03-02 PROCEDURE — 74011000250 HC RX REV CODE- 250: Performed by: STUDENT IN AN ORGANIZED HEALTH CARE EDUCATION/TRAINING PROGRAM

## 2017-03-02 PROCEDURE — 80202 ASSAY OF VANCOMYCIN: CPT | Performed by: FAMILY MEDICINE

## 2017-03-02 PROCEDURE — 80053 COMPREHEN METABOLIC PANEL: CPT | Performed by: FAMILY MEDICINE

## 2017-03-02 RX ORDER — DOXYCYCLINE HYCLATE 100 MG
100 TABLET ORAL EVERY 12 HOURS
Status: DISCONTINUED | OUTPATIENT
Start: 2017-03-02 | End: 2017-03-10

## 2017-03-02 RX ORDER — SODIUM CHLORIDE 9 MG/ML
180 INJECTION, SOLUTION INTRAVENOUS CONTINUOUS
Status: DISCONTINUED | OUTPATIENT
Start: 2017-03-02 | End: 2017-03-02

## 2017-03-02 RX ORDER — ALBUTEROL SULFATE 1.25 MG/3ML
2.5 SOLUTION RESPIRATORY (INHALATION)
Status: DISCONTINUED | OUTPATIENT
Start: 2017-03-02 | End: 2017-03-03 | Stop reason: CLARIF

## 2017-03-02 RX ORDER — WARFARIN 2 MG/1
2 TABLET ORAL ONCE
Status: COMPLETED | OUTPATIENT
Start: 2017-03-02 | End: 2017-03-02

## 2017-03-02 RX ORDER — FLUTICASONE PROPIONATE 50 MCG
2 SPRAY, SUSPENSION (ML) NASAL DAILY
Status: DISCONTINUED | OUTPATIENT
Start: 2017-03-03 | End: 2017-03-10 | Stop reason: HOSPADM

## 2017-03-02 RX ORDER — ACETAMINOPHEN 325 MG/1
650 TABLET ORAL EVERY 6 HOURS
Status: DISCONTINUED | OUTPATIENT
Start: 2017-03-02 | End: 2017-03-05

## 2017-03-02 RX ORDER — INSULIN LISPRO 100 [IU]/ML
10 INJECTION, SOLUTION INTRAVENOUS; SUBCUTANEOUS ONCE
Status: COMPLETED | OUTPATIENT
Start: 2017-03-02 | End: 2017-03-02

## 2017-03-02 RX ORDER — WARFARIN 2 MG/1
4 TABLET ORAL DAILY
Qty: 30 TAB | Refills: 0 | Status: SHIPPED | OUTPATIENT
Start: 2017-03-02 | End: 2017-03-10

## 2017-03-02 RX ORDER — ISOSORBIDE MONONITRATE 120 MG/1
120 TABLET, EXTENDED RELEASE ORAL DAILY
Qty: 30 TAB | Refills: 0 | Status: SHIPPED | OUTPATIENT
Start: 2017-03-02 | End: 2017-03-10

## 2017-03-02 RX ORDER — INSULIN GLARGINE 100 [IU]/ML
20 INJECTION, SOLUTION SUBCUTANEOUS
Status: DISCONTINUED | OUTPATIENT
Start: 2017-03-02 | End: 2017-03-02

## 2017-03-02 RX ORDER — BENZONATATE 100 MG/1
100 CAPSULE ORAL
Status: DISCONTINUED | OUTPATIENT
Start: 2017-03-02 | End: 2017-03-10 | Stop reason: HOSPADM

## 2017-03-02 RX ORDER — BUMETANIDE 1 MG/1
2 TABLET ORAL 2 TIMES DAILY
Status: DISCONTINUED | OUTPATIENT
Start: 2017-03-02 | End: 2017-03-02

## 2017-03-02 RX ORDER — INSULIN GLARGINE 100 [IU]/ML
10 INJECTION, SOLUTION SUBCUTANEOUS
Status: DISCONTINUED | OUTPATIENT
Start: 2017-03-02 | End: 2017-03-02

## 2017-03-02 RX ORDER — PREDNISONE 10 MG/1
TABLET ORAL
Qty: 21 TAB | Refills: 0 | Status: SHIPPED | OUTPATIENT
Start: 2017-03-02 | End: 2017-03-10

## 2017-03-02 RX ORDER — ONDANSETRON 2 MG/ML
4 INJECTION INTRAMUSCULAR; INTRAVENOUS
Status: DISCONTINUED | OUTPATIENT
Start: 2017-03-02 | End: 2017-03-10 | Stop reason: HOSPADM

## 2017-03-02 RX ORDER — DOXYCYCLINE HYCLATE 100 MG
100 TABLET ORAL EVERY 12 HOURS
Status: DISCONTINUED | OUTPATIENT
Start: 2017-03-02 | End: 2017-03-02

## 2017-03-02 RX ORDER — INSULIN LISPRO 100 [IU]/ML
15 INJECTION, SOLUTION INTRAVENOUS; SUBCUTANEOUS ONCE
Status: COMPLETED | OUTPATIENT
Start: 2017-03-02 | End: 2017-03-02

## 2017-03-02 RX ADMIN — CARVEDILOL 25 MG: 12.5 TABLET, FILM COATED ORAL at 16:39

## 2017-03-02 RX ADMIN — Medication 10 ML: at 14:00

## 2017-03-02 RX ADMIN — DOCUSATE SODIUM 100 MG: 100 CAPSULE ORAL at 21:26

## 2017-03-02 RX ADMIN — ASPIRIN 81 MG CHEWABLE TABLET 81 MG: 81 TABLET CHEWABLE at 08:33

## 2017-03-02 RX ADMIN — SUCRALFATE 1 G: 1 TABLET ORAL at 21:26

## 2017-03-02 RX ADMIN — HYDRALAZINE HYDROCHLORIDE 25 MG: 25 TABLET, FILM COATED ORAL at 21:26

## 2017-03-02 RX ADMIN — INSULIN LISPRO 15 UNITS: 100 INJECTION, SOLUTION INTRAVENOUS; SUBCUTANEOUS at 21:26

## 2017-03-02 RX ADMIN — DOXYCYCLINE HYCLATE 100 MG: 100 TABLET, COATED ORAL at 21:26

## 2017-03-02 RX ADMIN — ONDANSETRON 4 MG: 2 INJECTION INTRAMUSCULAR; INTRAVENOUS at 18:21

## 2017-03-02 RX ADMIN — ISOSORBIDE MONONITRATE 120 MG: 30 TABLET ORAL at 08:33

## 2017-03-02 RX ADMIN — IPRATROPIUM BROMIDE AND ALBUTEROL SULFATE 3 ML: .5; 2.5 SOLUTION RESPIRATORY (INHALATION) at 07:04

## 2017-03-02 RX ADMIN — IPRATROPIUM BROMIDE AND ALBUTEROL SULFATE 3 ML: .5; 2.5 SOLUTION RESPIRATORY (INHALATION) at 11:10

## 2017-03-02 RX ADMIN — INSULIN LISPRO 10 UNITS: 100 INJECTION, SOLUTION INTRAVENOUS; SUBCUTANEOUS at 16:39

## 2017-03-02 RX ADMIN — HUMAN INSULIN 15 UNITS: 100 INJECTION, SUSPENSION SUBCUTANEOUS at 21:00

## 2017-03-02 RX ADMIN — INSULIN LISPRO 10 UNITS: 100 INJECTION, SOLUTION INTRAVENOUS; SUBCUTANEOUS at 11:46

## 2017-03-02 RX ADMIN — IPRATROPIUM BROMIDE AND ALBUTEROL SULFATE 3 ML: .5; 2.5 SOLUTION RESPIRATORY (INHALATION) at 15:12

## 2017-03-02 RX ADMIN — IPRATROPIUM BROMIDE AND ALBUTEROL SULFATE 3 ML: .5; 2.5 SOLUTION RESPIRATORY (INHALATION) at 03:54

## 2017-03-02 RX ADMIN — SODIUM CHLORIDE 180 ML/HR: 900 INJECTION, SOLUTION INTRAVENOUS at 06:55

## 2017-03-02 RX ADMIN — SUCRALFATE 1 G: 1 TABLET ORAL at 16:39

## 2017-03-02 RX ADMIN — INSULIN LISPRO 10 UNITS: 100 INJECTION, SOLUTION INTRAVENOUS; SUBCUTANEOUS at 08:34

## 2017-03-02 RX ADMIN — OXYCODONE HYDROCHLORIDE AND ACETAMINOPHEN 1 TABLET: 5; 325 TABLET ORAL at 18:08

## 2017-03-02 RX ADMIN — HYDRALAZINE HYDROCHLORIDE 25 MG: 25 TABLET, FILM COATED ORAL at 08:33

## 2017-03-02 RX ADMIN — SUCRALFATE 1 G: 1 TABLET ORAL at 11:46

## 2017-03-02 RX ADMIN — HYDRALAZINE HYDROCHLORIDE 25 MG: 25 TABLET, FILM COATED ORAL at 16:39

## 2017-03-02 RX ADMIN — ATORVASTATIN CALCIUM 80 MG: 20 TABLET, FILM COATED ORAL at 17:59

## 2017-03-02 RX ADMIN — PREDNISONE 40 MG: 20 TABLET ORAL at 08:32

## 2017-03-02 RX ADMIN — SUCRALFATE 1 G: 1 TABLET ORAL at 08:33

## 2017-03-02 RX ADMIN — WARFARIN SODIUM 2 MG: 2 TABLET ORAL at 17:59

## 2017-03-02 RX ADMIN — PANTOPRAZOLE SODIUM 40 MG: 40 TABLET, DELAYED RELEASE ORAL at 08:33

## 2017-03-02 RX ADMIN — ALBUTEROL SULFATE 2.5 MG: 1.25 SOLUTION RESPIRATORY (INHALATION) at 21:08

## 2017-03-02 RX ADMIN — CARVEDILOL 25 MG: 12.5 TABLET, FILM COATED ORAL at 08:32

## 2017-03-02 NOTE — CDMP QUERY
Please clarify if this patient is being treated/managed for:    =>Chronic Hypoxemic Respiratory Failure in the setting of COPD/morbid obesity requiring Continuous O2, sat monitoring,  =>Other Explanation of clinical findings  =>Unable to Determine (no explanation of clinical findings)    The medical record reflects the following clinical findings, treatment, and risk factors:    Risk Factors: Morbid obesity; COPD    Clinical Indicators: Home O2 at 2L (baseline); CPAP     Treatment: Continuous O2 2 l, monitor labs, sats    Please clarify and document your clinical opinion in the progress notes and discharge summary including the definitive and/or presumptive diagnosis, (suspected or probable), related to the above clinical findings. Please include clinical findings supporting your diagnosis.     Thank you,  Arianna Samuel MSN, [address] [place] [address]

## 2017-03-02 NOTE — CONSULTS
[facility] [address]   [address] [facility], [address] [place], [place] [address]   [address] [facility]       Name:  Kyle Knight   MR#:  [id number]   :  1955   Account #:  [de-identified]    Date of Consultation:  2017   Date of Adm:  2017       REASON FOR CONSULTATION: Acute on chronic kidney disease. HISTORY OF PRESENT ILLNESS: This is a 56-year-old [facility]   American female with the following medical problems:   1. Diabetes mellitus. 2. History of uterine carcinoma. 3. Vitamin D deficiency. 4. Dyslipidemia. 5. Obesity. 6. Anemia of chronic kidney disease. 7. Hypertension. 8. History of tobacco use. 9. Coronary artery disease. 10. History of congestive heart failure. 11. History of abdominal aortic aneurysm. 12. Gastroparesis. 13. Sleep apnea. 14. Hypertension. 15. Edema. 16. Chronic kidney disease, stage 4.   17. \"Rheumatoid arthritis. \"   18. Recent right lower extremity deep venous thrombosis. 19. Chronic respiratory failure. 20. Interstitial lung disease (biopsy ). We are asked to see the patient in regard to an elevated serum   creatinine, today measured at 4.24 mg/dL. For reference, her serum   creatinine yesterday was 3.92 and on 2017, 3.32 mg per   deciliter. This is a 56[facility]-old [facility] who is followed by my   partner, Dr. Winsome Adams, in the chronic kidney disease clinic. She   was initially seen approximately , and an extensive evaluation at   that time was notable for an initial SARIAT positive for paraprotein; this was   negative on repeat, and in fact, she was evaluated by Hematology   (Dr. Dayanna Esquivel), not felt to have multiple myeloma. She does, however, carry a diagnosis of rheumatoid arthritis (not   documented in her chart), and had been followed by a rheumatologist   about 25 years ago. She is not currently being treated nor followed by   Rheumatology.  It does not appear that she has received any biologic   agents. She is not currently on Plaquenil. She recently experienced the onset of a right lower extremity deep   venous thrombosis. She had no preceding prolonged immobilization,   however. She was started on anticoagulant therapy, which was   continued. She developed left-sided pleuritic chest pain, and was   admitted to the hospital 2 days ago. As a result of her elevated   creatinine and history of IV CONTRAST ALLERGY, she did not   undergo pulmonary angiography, but has been continued on her   anticoagulant therapy and is quite knowledgeable in regards to her INR   value. She has not had an IVC filter placed previously. She has no   other history of clotting disorder, and there is no family history of   connective tissue disease or clotting abnormalities. She has had no   emesis, no diarrhea, no difficulty initiating or maintaining a stream of   urine. She indicates that her urinary output today has been a bit   diminished. Following her recent hospitalization (after developing right lower   extremity DVT), she tells me that she gained about 28 pounds,   subsequently was diuresed about a week ago. CURRENT MEDICATIONS INCLUDE:   1. Aspirin 81 mg daily. 2. Atorvastatin. 3. Carvedilol 25 mg b.i.d.   4. Doxycycline. 5. Hydralazine 25 mg t.i.d.   6. ISMO 120 mg daily. 7. Pantoprazole 40 mg daily. 8. Prednisone 40 mg daily. 9. Carafate. 10. Anoro Ellipta. 11. Vancomycin (single dose given yesterday). 12. Coumadin. PHYSICAL EXAMINATION   GENERAL: Morbidly obese, elderly appearing    female in bed. VITAL SIGNS: Most recent blood pressure documented at 167/92   (blood pressure of 103/58 noted yesterday), current pulse 89,   temperature 98.9. HEAD: Normocephalic and atraumatic. The eyes show extraocular   movements to be intact. Sclerae appear anicteric. NECK: Supple. LUNGS: Clear to auscultation. No pleural friction rub noted.    CARDIOVASCULAR: Shows a rhythm which is regular. ABDOMEN: Morbidly obese, soft. EXTREMITIES: Show no thigh edema (left). NEUROLOGIC: She is awake, alert, answers questions appropriately. LABORATORY DATA OF NOTE: White blood cell count 6.1,   hematocrit 29%, platelet count 394,925. Sodium 136, potassium 4.9,   chloride and bicarbonate are 123, with a BUN and creatinine of 47 and   4.24, glucose 425, calcium is 8.1. AST and ALT are 7 and 20,   respectively. Vancomycin level today is 29.2, INR is 2.3. Urinalysis   performed 02/2017 showed a pH of 5, specific gravity of 1.015, positive   for protein, negative for blood. ASSESSMENT AND PLAN   1. The patient has developed acute on chronic kidney disease. She   has a diagnosis of \"rheumatoid arthritis\", she has had a recent   development of a right lower extremity deep venous thrombosis and   had presented to the hospital with shortness of breath and pleuritic   chest pain. Her INR appears to be near therapeutic. 2. The possibility of a pulmonary embolism, resulting in deranged   cardiopulmonary hemodynamics and acute kidney injury needs to be   considered rather strongly. In addition, she was relatively hypotensive   yesterday, and this could have resulted in acute tubular necrosis. Bladder outlet obstruction, a consideration, particularly given that she   indicates that her urinary volume today is diminished. 3. Her urinalyses have been devoid of blood (going back to 2012). 4. She was evaluated previously for paraprotein, and ultimately this   was found to be negative. 5. I have ordered full serologies; I have not repeated the evaluation for   paraprotein, however. In the absence of hematuria the possibility of a   vasculitis or a hepatitis virus associated glomerulonephritis would   appear to be rather unlikely.  Given her symptomatology, elderly age,   an antiglomerular basement membrane antibody mediated disease is a   consideration, but again, there is no evidence for hematuria (although   this could be pulmonary limited) and  \"idiopathic\" interstitial lung   disease has been identified with ANCA associated vasculitis   (pulmonary limited). 6. Await the results of the above studies. 7. Postvoid bladder scan. 8. Will repeat a urinalysis as the last one now is over a month old. If things continue to deteriorate she will need dialysis. Dr. Klever Gloria will   followup tomorrow.         Kuldip Polanco MD      CGA / DV   D:  03/02/2017   16:57   T:  03/02/2017   18:12   Job #:  [id number]

## 2017-03-02 NOTE — PROGRESS NOTES
3-2-2017 CASE MANAGEMENT NOTE:  I met with the pt and her , Crystal Olmedo ([phone]), to introduce myself and explain the role of case management. She confirmed that she, her  and 2 adult sons  In a one story house with 2 entry steps. She is independent with her ADL's and drives. She has a cane, rolling walker, bedside commode, shower chair, nebulizer and CPaP. She also has oxygen from [employer] but is working with her pulmonary MD's office to get an [employer] portable concentrator. Her PCP is Dr. Candelaria Nino with [facility]. She has prescription drug coverage and gets her medications from [facility] on [address]. She does not anticipate any discharge needs.  Landon Ott, BSW, CM

## 2017-03-02 NOTE — PROGRESS NOTES
Transfer note:    Admission date: 02/28  Admission diagnosis: acute on chronic respiratory failure  Consults: pulm, Renal  Reason for transfer: Patient fired [facility] and wanted to transfer care to hospitalist service    Current Meds:  Current Facility-Administered Medications   Medication Dose Route Frequency    acetaminophen (TYLENOL) tablet 650 mg  650 mg Oral Q6H    albuterol (ACCUNEB) nebulizer solution 2.5 mg  2.5 mg Nebulization Q4H RT    benzonatate (TESSALON) capsule 100 mg  100 mg Oral TID PRN    [START ON 3/3/2017] fluticasone (FLONASE) 50 mcg/actuation nasal spray 2 Spray  2 Spray Both Nostrils DAILY    doxycycline (VIBRA-TABS) tablet 100 mg  100 mg Oral Q12H    insulin glargine (LANTUS) injection 20 Units  20 Units SubCUTAneous QHS    predniSONE (DELTASONE) tablet 40 mg  40 mg Oral DAILY WITH BREAKFAST    isosorbide mononitrate ER (IMDUR) tablet 120 mg  120 mg Oral DAILY    oxyCODONE-acetaminophen (PERCOCET) 5-325 mg per tablet 1 Tab  1 Tab Oral Q8H PRN    polyethylene glycol (MIRALAX) packet 17 g  17 g Oral DAILY PRN    sodium chloride (NS) flush 5-10 mL  5-10 mL IntraVENous Q8H    sodium chloride (NS) flush 5-10 mL  5-10 mL IntraVENous PRN    sodium chloride (NS) flush 5-10 mL  5-10 mL IntraVENous Q8H    sodium chloride (NS) flush 5-10 mL  5-10 mL IntraVENous PRN    insulin lispro (HUMALOG) injection   SubCUTAneous AC&HS    glucose chewable tablet 16 g  4 Tab Oral PRN    dextrose (D50W) injection syrg 12.5-25 g  12.5-25 g IntraVENous PRN    glucagon (GLUCAGEN) injection 1 mg  1 mg IntraMUSCular PRN    docusate sodium (COLACE) capsule 100 mg  100 mg Oral QHS    hydrALAZINE (APRESOLINE) tablet 25 mg  25 mg Oral TID    sucralfate (CARAFATE) tablet 1 g  1 g Oral AC&HS    carvedilol (COREG) tablet 25 mg  25 mg Oral BID WITH MEALS    pantoprazole (PROTONIX) tablet 40 mg  40 mg Oral ACB    aspirin chewable tablet 81 mg  81 mg Oral DAILY    atorvastatin (LIPITOR) tablet 80 mg 80 mg Oral QPM    Warfarin- Pharmacy Dosing   Other Rx Dosing/Monitoring    umeclidinium-vilanterol (ANORO ELLIPTA) 62.5 mcg- 25 mcg/inhalation  1 Puff Inhalation ACL       Hospital Course:    Hx of HTN, DM, DVT, CAD s/p CABG, CKD 4, COPD, ILD, presented with dyspnea, COPD, ILD.  Complicated by worsening renal failure. Patient wanted to transfer care to Hospitalist service on 03/02    1) Acute on chronic respiratory failure/hypoxia: likely due to COPD and underlying ILD. No evidence for pneumonia. V/Q scan neg for PE. Echo neg for pericardial effusion. Will cont steroids, nebs. Pulm following    2) COPD w/ acute exacerbation: cont above therapy    3) L sided chest pain: likely MSK.  V/Q neg. Cont pain meds prn. Monitor closely    4) Chronic diastolic CHF/HTN: monitor volume. Cont coreg, imdur, hydralazine. Holding bumex due to renal failure    5) ARF/CKD 4: unclear etiology. Could be medications induced? (Vancomycin). Will monitor BMP. Nephrology following    6) DM type 2 w/ renal complications: last Q4C 8.4%. BS elevated from steroids. DM educator following. Will d/c Lantus. Start home insulin 75/25 at 30u bid (due to poor renal function). Also start NPH bid for steroids    7) LLE DVT: will cont coumadin, monitor INR.   Pharm dosing

## 2017-03-02 NOTE — CONSULTS
Name: Zulay [place]: [employer]   : 1955 Admit Date: 2017   Phone: [phone]  Room: 316/01   PCP: Wang Iraheta DO  MRN: [id number]   Date: 3/2/2017  Code: Full Code        HPI:      Chart and notes reviewed. Data reviewed. I review the patient's current medications in the medical record at each encounter.  I have evaluated and examined the patient. 3:03 PM       History was obtained from patient. I was asked by Kalyn Thayer MD to see David Rabago in consultation for a chief complaint of please evaluate patient. History of Present Illness:  Mrs. Maria L Grfifith is a pleasant 63year old female well known to our group with history of chronic respiratory failure on 2L , smoking related ILD (via open lung biopsy in ), JASEN, diastolic heart failure, pulm HTN, and CKD. She was seen as an outpatient on Tuesday morning by  of our practice with worsening shortness of breath and a stabbing sensation in left chest that radiated to her back. She was sent to [facility] ED for further evaluation. She was recently hospitalized in early February for decompensated diastolic CHF, RLE DVT for which she was started on [facility] with Coumadin, and possible PNA. She was bronchedd during that hospitalization: cultures and cytology were negative. She completed multiple rounds of antibiotics prior to that hospitalization. She was admitted on Tuesday and is being treated for possible CAP and COPD exacerbation. She is receiving Vancomycin and Cefepime. Her chest pain has improved, however she is still feeling very SOB with exertion. She can not walk from the bed to the door without becoming very tired. Denies fever or chills. Not coughing up any phlegm. She feels like she has caught a cold since she has arrived here. Does not worsening swelling. She is frustrated that she does not feel better. She reports a decrease in urine output.     Images: Personally reviewed. CXR:  Lungs are abnormal but the appearance has not changed when compared  1/25/2017. These findings suggest chronic inflammation or infection with areas  of scarring.     WBC 6.1  Hgb 9.5  Creatinine 4.24: worsening  Troponin negative  Pro-BNP 2/28: 164  INR 2.3  BLE dopplers:  Negative for DVT this admission      Past Medical History:   Diagnosis Date     Sleep Apnea 2/16/2011    Angina, class III ([address] 8/9/2013    Aortic aneurysm (HCC)     CAD (coronary Artery Disease) Native Artery 2/16/2011    CKD (chronic kidney disease) stage 4, GFR 15-29 ml/min ([address] 2/10/2017    Diabetic gastroparesis (HCC)     Diastolic heart failure ([address] 10/5/2012    Esophageal stricture 2012    dialted Dr. Army Savage G6PD deficiency ([address]     trait    GERD (gastroesophageal reflux disease)     Hypertensive Cardiovasc Dis Benign, No CHF 2/16/2011    ILD (interstitial lung disease) ([address]     open lung bx CJW 2010    OA (osteoarthritis)     Obesity 2/16/2011    Ovarian cancer ([address]     cervical and uterine    Rheumatoid arteritis ([address]     T2DM (type 2 diabetes mellitus) ([address] 8/9/2013    Tobacco use disorder 3/21/2012    Uterine cervix cancer ([address]     Vitamin D deficiency 8/9/2013       Past Surgical History:   Procedure Laterality Date    CARDIAC SURG PROCEDURE UNLIST      stents    COLONOSCOPY N/A 6/24/2016    COLONOSCOPY performed by Abhijeet Arciniega MD at [facility] HX APPENDECTOMY      HX CARPAL TUNNEL RELEASE      bilateral    HX CHOLECYSTECTOMY      HX HERNIA REPAIR      HX HYSTERECTOMY      HX ORTHOPAEDIC  11/12/12    right knee replacement    HX TONSIL AND ADENOIDECTOMY      UPPER GI ENDOSCOPY,DILATN W GUIDE  6/24/2016            Family History   Problem Relation Age of Onset    Heart Disease Mother     Heart Disease Brother        Social History   Substance Use Topics    Smoking status: Former Smoker     Packs/day: 0.50     Years: 41.00     Types: Cigarettes     Quit date: 11/9/2014    Smokeless tobacco: Never Used    Alcohol use No       Allergies   Allergen Reactions    Contrast Dye [Iodine] Anaphylaxis    Levaquin [Levofloxacin] Nausea and Vomiting    Morphine Hives and Itching       Current Facility-Administered Medications   Medication Dose Route Frequency    acetaminophen (TYLENOL) tablet 650 mg  650 mg Oral Q6H    warfarin (COUMADIN) tablet 2 mg  2 mg Oral ONCE    albuterol-ipratropium (DUO-NEB) 2.5 MG-0.5 MG/3 ML  3 mL Nebulization Q4H RT    predniSONE (DELTASONE) tablet 40 mg  40 mg Oral DAILY WITH BREAKFAST    isosorbide mononitrate ER (IMDUR) tablet 120 mg  120 mg Oral DAILY    oxyCODONE-acetaminophen (PERCOCET) 5-325 mg per tablet 1 Tab  1 Tab Oral Q8H PRN    polyethylene glycol (MIRALAX) packet 17 g  17 g Oral DAILY PRN    sodium chloride (NS) flush 5-10 mL  5-10 mL IntraVENous Q8H    sodium chloride (NS) flush 5-10 mL  5-10 mL IntraVENous PRN    sodium chloride (NS) flush 5-10 mL  5-10 mL IntraVENous Q8H    sodium chloride (NS) flush 5-10 mL  5-10 mL IntraVENous PRN    insulin lispro (HUMALOG) injection   SubCUTAneous AC&HS    glucose chewable tablet 16 g  4 Tab Oral PRN    dextrose (D50W) injection syrg 12.5-25 g  12.5-25 g IntraVENous PRN    glucagon (GLUCAGEN) injection 1 mg  1 mg IntraMUSCular PRN    docusate sodium (COLACE) capsule 100 mg  100 mg Oral QHS    hydrALAZINE (APRESOLINE) tablet 25 mg  25 mg Oral TID    sucralfate (CARAFATE) tablet 1 g  1 g Oral AC&HS    carvedilol (COREG) tablet 25 mg  25 mg Oral BID WITH MEALS    pantoprazole (PROTONIX) tablet 40 mg  40 mg Oral ACB    aspirin chewable tablet 81 mg  81 mg Oral DAILY    atorvastatin (LIPITOR) tablet 80 mg  80 mg Oral QPM    Warfarin- Pharmacy Dosing   Other Rx Dosing/Monitoring    umeclidinium-vilanterol (ANORO ELLIPTA) 62.5 mcg- 25 mcg/inhalation  1 Puff Inhalation ACL    Vancomycin- Pharmacy Dosing per Level   Other Rx Dosing/Monitoring         REVIEW OF SYSTEMS Negative except as stated in the HPI. Physical Exam:   Visit Vitals    /90 (BP 1 Location: Right arm, BP Patient Position: At rest)    Pulse 86    Temp 97.9 °F (36.6 °C)    Resp 20    Ht 5' 10\" (1.778 m)    Wt 148.4 kg (327 lb 3.2 oz)    SpO2 99%    BMI 46.95 kg/m2       General:  Alert, cooperative, no distress, appears stated age. Head:  Normocephalic, without obvious abnormality, atraumatic. Eyes:  Conjunctivae/corneas clear. Nose: Nares normal. Septum midline. Mucosa normal.    Throat: Lips, mucosa, and tongue normal. Teeth and gums normal.   Neck: Supple, symmetrical, trachea midline, no adenopathy   Lungs:   Diminished bs in the bases, otherwise clear. Chest wall:  No tenderness or deformity. Heart:  Regular rate and rhythm, S1, S2 normal, no murmur, click, rub or gallop. Abdomen:   Soft, non-tender, obese. Bowel sounds normal.    Extremities: Extremities normal, atraumatic, no cyanosis, LEs tender to touch   Pulses: 2+ and symmetric all extremities. Skin: Skin color, texture, turgor normal. No rashes or lesions   Lymph nodes: Cervical nodes normal.   Neurologic: Grossly nonfocal       Lab Results   Component Value Date/Time    Sodium 136 03/02/2017 04:42 AM    Potassium 4.9 03/02/2017 04:42 AM    Chloride 100 03/02/2017 04:42 AM    CO2 23 03/02/2017 04:42 AM    BUN 47 03/02/2017 04:42 AM    Creatinine 4.24 03/02/2017 04:42 AM    Glucose 425 03/02/2017 04:42 AM    Calcium 8.1 03/02/2017 04:42 AM    Magnesium 2.1 02/20/2017 05:30 AM    Phosphorus 3.1 02/20/2017 05:30 AM    Lactic acid 1.2 02/02/2017 03:20 PM       Lab Results   Component Value Date/Time    WBC 6.1 03/02/2017 04:42 AM    HGB 9.5 03/02/2017 04:42 AM    PLATELET 722 13/30/0811 04:42 AM    MCV 92.0 03/02/2017 04:42 AM       Lab Results   Component Value Date/Time    INR 2.3 03/02/2017 04:42 AM    aPTT 34.8 02/28/2017 12:55 PM    AST (SGOT) 7 03/02/2017 04:42 AM    Alk.  phosphatase 79 03/02/2017 04:42 AM Protein, total 6.9 03/02/2017 04:42 AM    Albumin 2.9 03/02/2017 04:42 AM    Globulin 4.0 03/02/2017 04:42 AM       Lab Results   Component Value Date/Time    Iron 29 11/09/2014 03:30 AM    TIBC 245 11/09/2014 03:30 AM    Iron % saturation 12 11/09/2014 03:30 AM    Ferritin 255 11/09/2014 04:00 AM       No results found for: SR, CRP, GLENNY, ANAIGG, RA, RPR, RPRT, VDRLT, VDRLS, TSH, TSHEXT     No results found for: PH, PHI, PCO2, PCO2I, PO2, PO2I, HCO3, HCO3I, FIO2, FIO2I    Lab Results   Component Value Date/Time    CK 81 02/28/2017 12:55 PM    CK-MB Index Cannot be calulated 02/28/2017 12:55 PM    Troponin-I, Qt. <0.04 03/01/2017 10:33 AM        Lab Results   Component Value Date/Time    Culture result: NO GROWTH 2 DAYS 02/28/2017 04:26 PM    Culture result: HEAVY  NORMAL RESPIRATORY WAGNER   02/03/2017 04:10 PM    Culture result: MODERATE  CANDIDA TROPICALIS   02/03/2017 04:10 PM    Culture result:  02/03/2017 04:10 PM     CULTURE WILL BE HELD FOR 4 WEEKS. IF THERE IS ADDITIONAL FUNGAL GROWTH, A NEW REPORT WILL FOLLOW.        No results found for: TOXA1, RPR, HBCM, HBSAG, HAAB, HCAB, HCAB1, HAAT, G6PD, CRYAC, HIVGT, HIVR, HIV1, HIV12, HIVPC, HIVRPI    Lab Results   Component Value Date/Time    CK 81 02/28/2017 12:55 PM    CK 54 02/17/2017 08:30 AM       Lab Results   Component Value Date/Time    Color YELLOW/STRAW 02/03/2017 05:36 AM    Appearance CLEAR 02/03/2017 05:36 AM    pH (UA) 5.0 02/03/2017 05:36 AM    Protein 100 02/03/2017 05:36 AM    Glucose NEGATIVE  02/03/2017 05:36 AM    Ketone NEGATIVE  02/03/2017 05:36 AM    Bilirubin NEGATIVE  02/03/2017 05:36 AM    Blood NEGATIVE  02/03/2017 05:36 AM    Urobilinogen 0.2 02/03/2017 05:36 AM    Nitrites NEGATIVE  02/03/2017 05:36 AM    Leukocyte Esterase NEGATIVE  02/03/2017 05:36 AM    WBC 0-4 02/03/2017 05:36 AM    RBC 0-5 02/03/2017 05:36 AM    Bacteria NEGATIVE  02/03/2017 05:36 AM       Impression  · Chronic hypoxic respiratory failure: secondary ILD exacerbation? · History of RLE DVT; may also have PE, but allergic to contrast and had CKD  · Smoking related ILD on open lung biopsy [address] in 2010  · JASEN  · Chest pain: improved  · Diastolic CHF  · Pulmonary HTN  · HTN  · Acute/chronic kidney disease stage 4: worsening  · History of tobacco use; quit 11/2014     PLAN  · Continue O2 and wean as able to keep sats > 90%  · Continue scheduled nebs: switch to Albuterol as she is already receiving her home Stiolto  · Continue prednisone and taper  · Do not believe she is actively infected: can taper Vancomycin and continue Cefepime for now. Follow cultures. · Start Tessalon and [address]. · Consult Renal concerning increased creatinine: will restart home diuretic and stop IVF. Recheck pro-BNP. Strict I&O  · Check ECHO: if unrevealing, may need non-contrast CT to ensure she does not have a worsening pericardial effusion (as evidenced on her last CT in February)  · Pharmacy to dose Coumadin  · CPAP nightly with 2L bled in  · GI prophylaxis: Protonix    Thank you very much for the consult. We will follow along with you in Ms. Jess 45' care.         Miranda Duque NP

## 2017-03-02 NOTE — PROGRESS NOTES
[facility] Pharmacy Dosing Services: 3/2/17    Consult for Warfarin Dosing by Pharmacy by Dr. Chastity Jarvis provided for this 63 y.o.  female , for indication of Venous Thrombosis. Day of Therapy PTA: 4mg on Mon, Fri, 2mg rest of the week  Dose to achieve an INR goal of 2-3    Order entered for  Warfarin  2 (mg) ordered to be given today at 18:00. Significant drug interactions: None  Previous dose given 2mg 3/2/17   PT/INR Lab Results   Component Value Date/Time    INR 2.3 03/02/2017 04:42 AM      Platelets Lab Results   Component Value Date/Time    PLATELET 913 80/55/0358 04:42 AM      H/H Lab Results   Component Value Date/Time    HGB 9.5 03/02/2017 04:42 AM        Pharmacy to follow daily and will provide subsequent Warfarin dosing based on clinical status.     Azra Keenan, PharmD, BCPS  Contact information [phone]

## 2017-03-02 NOTE — PROGRESS NOTES
Bedside and Verbal shift change report given to Farideh EVANS  (oncoming nurse) by Liam Gu (offgoing nurse). Report included the following information SBAR, Kardex, ED Summary, Procedure Summary, Intake/Output, MAR, Recent Results and Cardiac Rhythm NSR.

## 2017-03-02 NOTE — PROGRESS NOTES
As patient desires care under hospitalist, we will transfer the patient to hospitalist service. Talked to Dr. Chio Sánchez who will take the patient today.  Appreciate his help with the continued care of this patient

## 2017-03-02 NOTE — PROGRESS NOTES
Dictated  Lenin+ckd  Low bp yesterday  +Vanc exposure but only single dose yesterday  Recent DVT and [resentation with pleuritic CP would be concerning for PE and resultant deranged cardio-pulm hemodynamics --> LENIN    She has \"RA\" ; no obv jt deformities of hands  She has ILD (bx 2010)    No hematuria, +DM and HTN  BP low yesterday, high today    Seros, to include ANCA and GBM Ab  PVR  May need renal US  UA    May need RRT if not improving soon

## 2017-03-02 NOTE — DIABETES MGMT
[facility]    Elevated Blood Glucose Note (POCT Glucose >180 mg/dL)    Recommendations/ Comments: Chart reviewed for elevated blood glucose. David Rabago is a 63 y.o. female with a past medical history significant for DM per Dr. Efren Martinez MD's H&P dated 2017. Fasting glucose today: 425 mg/dL (per AM lab). Required 24 units of correction in the last 24 hours. If appropriate, please consider the followin. Resuming Novolog Mix 70/30 at 30 units before meals- This is half of her home dose. She takes it on a sliding scale after meals - 60 units as a minimum per her report. 2. NPH 15 units* every 12 hours, given current dosage of oral steroids - 40mg daily    *Weight-based calculations based on guidelines from  Greg Blanca., & JUANCHO Veloz (2009). Glucocorticoid-induced hyperglycemia. Endocrine Practice, 15(5), [id number]. Recent Glucose Results:   Lab Results   Component Value Date/Time     (H) 2017 04:42 AM    GLUCPOC 409 (H) 2017 07:26 AM    GLUCPOC 330 (H) 2017 08:35 PM    GLUCPOC 187 (H) 2017 04:02 PM        Hospital (inpatient) medications:  1. Correction Scale: Lispro (Humalog) Insulin Resistant Sensitivity scale to cover for glucose > 139 mg/dL before meals and for glucose >199 at bedtime      Prior to admission medications: per past medical records  - Novolog Mix 70/30 - on a sliding scale after meals  For glucose <150 - 60 units; will add 20 units for every 50 points above 150. A1C:   Lab Results   Component Value Date/Time    Hemoglobin A1c 6.6 2017 03:30 AM    Hemoglobin A1c 5.2 2014 04:00 AM     Lab Results   Component Value Date/Time    Creatinine 4.24 2017 04:42 AM     Active Orders   Diet    DIET CARDIAC Regular; 1.5 GM NA; Consistent Carb 2000kcal        Thank you. Gonzalez Terry.  Marysol Baltazar, RN, BSN, MPH  Diabetes [address] [place] [address]  [phone]    -For most hospitalized persons with hyperglycemia in the intensive care unit (ICU), a glucose range of 140 to 180 mg/dL is recommended, provided this target can be safely achieved. *  - For general medicine and surgery patients in non-ICU settings, a premeal glucose target <140 mg/dL and a random blood glucose <180 mg/dL are recommended. *    DIEGO Orosco, Darlyn Collins., Jocelyn Gramajo., ... & Doris Martin (1651). AMERICAN ASSOCIATION OF CLINICAL ENDOCRINOLOGISTS AND AMERICAN COLLEGE OF ENDOCRINOLOGY-CLINICAL PRACTICE GUIDELINES FOR DEVELOPING A DIABETES MELLITUS COMPREHENSIVE CARE PLAN-2015-EXECUTIVE SUMMARY: Complete guidelines are available at https://www. aace. com/publications/guidelines. Endocrine Practice, 21(4), [id number].

## 2017-03-03 LAB
ALBUMIN SERPL BCP-MCNC: 2.8 G/DL (ref 3.5–5)
ALBUMIN/GLOB SERPL: 0.8 {RATIO} (ref 1.1–2.2)
ALP SERPL-CCNC: 67 U/L (ref 45–117)
ALT SERPL-CCNC: 17 U/L (ref 12–78)
ANION GAP BLD CALC-SCNC: 11 MMOL/L (ref 5–15)
APPEARANCE UR: CLEAR
AST SERPL W P-5'-P-CCNC: 7 U/L (ref 15–37)
ATRIAL RATE: 75 BPM
B PERT DNA SPEC QL NAA+PROBE: NOT DETECTED
BACTERIA URNS QL MICRO: ABNORMAL /HPF
BASOPHILS # BLD AUTO: 0 K/UL (ref 0–0.1)
BASOPHILS # BLD: 0 % (ref 0–1)
BILIRUB DIRECT SERPL-MCNC: 0.1 MG/DL (ref 0–0.2)
BILIRUB SERPL-MCNC: 0.3 MG/DL (ref 0.2–1)
BILIRUB UR QL: NEGATIVE
BUN SERPL-MCNC: 54 MG/DL (ref 6–20)
BUN/CREAT SERPL: 14 (ref 12–20)
C PNEUM DNA SPEC QL NAA+PROBE: NOT DETECTED
CALCIUM SERPL-MCNC: 7.9 MG/DL (ref 8.5–10.1)
CALCULATED P AXIS, ECG09: 54 DEGREES
CALCULATED R AXIS, ECG10: 13 DEGREES
CALCULATED T AXIS, ECG11: 38 DEGREES
CHLORIDE SERPL-SCNC: 101 MMOL/L (ref 97–108)
CK MB CFR SERPL CALC: NORMAL % (ref 0–2.5)
CK MB SERPL-MCNC: <1 NG/ML (ref 5–25)
CK SERPL-CCNC: 43 U/L (ref 26–192)
CO2 SERPL-SCNC: 24 MMOL/L (ref 21–32)
COLOR UR: ABNORMAL
CREAT SERPL-MCNC: 3.99 MG/DL (ref 0.55–1.02)
DIAGNOSIS, 93000: NORMAL
EOSINOPHIL # BLD: 0 K/UL (ref 0–0.4)
EOSINOPHIL NFR BLD: 0 % (ref 0–7)
EPITH CASTS URNS QL MICRO: ABNORMAL /LPF
ERYTHROCYTE [DISTWIDTH] IN BLOOD BY AUTOMATED COUNT: 14.4 % (ref 11.5–14.5)
EST. AVERAGE GLUCOSE BLD GHB EST-MCNC: 151 MG/DL
FLUAV H1 2009 PAND RNA SPEC QL NAA+PROBE: NOT DETECTED
FLUAV H1 RNA SPEC QL NAA+PROBE: NOT DETECTED
FLUAV H3 RNA SPEC QL NAA+PROBE: NOT DETECTED
FLUAV SUBTYP SPEC NAA+PROBE: NOT DETECTED
FLUBV RNA SPEC QL NAA+PROBE: NOT DETECTED
GLOBULIN SER CALC-MCNC: 3.6 G/DL (ref 2–4)
GLUCOSE BLD STRIP.AUTO-MCNC: 229 MG/DL (ref 65–100)
GLUCOSE BLD STRIP.AUTO-MCNC: 299 MG/DL (ref 65–100)
GLUCOSE BLD STRIP.AUTO-MCNC: 301 MG/DL (ref 65–100)
GLUCOSE BLD STRIP.AUTO-MCNC: 374 MG/DL (ref 65–100)
GLUCOSE SERPL-MCNC: 338 MG/DL (ref 65–100)
GLUCOSE UR STRIP.AUTO-MCNC: >1000 MG/DL
HADV DNA SPEC QL NAA+PROBE: NOT DETECTED
HBA1C MFR BLD: 6.9 % (ref 4.2–6.3)
HBV SURFACE AG SER QL: 0.13 INDEX
HBV SURFACE AG SER QL: NEGATIVE
HCOV 229E RNA SPEC QL NAA+PROBE: NOT DETECTED
HCOV HKU1 RNA SPEC QL NAA+PROBE: NOT DETECTED
HCOV NL63 RNA SPEC QL NAA+PROBE: NOT DETECTED
HCOV OC43 RNA SPEC QL NAA+PROBE: NOT DETECTED
HCT VFR BLD AUTO: 27.2 % (ref 35–47)
HCV AB SERPL QL IA: NONREACTIVE
HCV COMMENT,HCGAC: NORMAL
HGB BLD-MCNC: 8.8 G/DL (ref 11.5–16)
HGB UR QL STRIP: NEGATIVE
HMPV RNA SPEC QL NAA+PROBE: NOT DETECTED
HPIV1 RNA SPEC QL NAA+PROBE: NOT DETECTED
HPIV2 RNA SPEC QL NAA+PROBE: NOT DETECTED
HPIV3 RNA SPEC QL NAA+PROBE: NOT DETECTED
HPIV4 RNA SPEC QL NAA+PROBE: NOT DETECTED
INR PPP: 2.2 (ref 0.9–1.1)
KETONES UR QL STRIP.AUTO: NEGATIVE MG/DL
LEUKOCYTE ESTERASE UR QL STRIP.AUTO: ABNORMAL
LYMPHOCYTES # BLD AUTO: 20 % (ref 12–49)
LYMPHOCYTES # BLD: 2.2 K/UL (ref 0.8–3.5)
M PNEUMO DNA SPEC QL NAA+PROBE: NOT DETECTED
MAGNESIUM SERPL-MCNC: 1.1 MG/DL (ref 1.6–2.4)
MCH RBC QN AUTO: 29.9 PG (ref 26–34)
MCHC RBC AUTO-ENTMCNC: 32.4 G/DL (ref 30–36.5)
MCV RBC AUTO: 92.5 FL (ref 80–99)
MONOCYTES # BLD: 0.9 K/UL (ref 0–1)
MONOCYTES NFR BLD AUTO: 8 % (ref 5–13)
NEUTS SEG # BLD: 7.8 K/UL (ref 1.8–8)
NEUTS SEG NFR BLD AUTO: 72 % (ref 32–75)
NITRITE UR QL STRIP.AUTO: NEGATIVE
P-R INTERVAL, ECG05: 172 MS
PH UR STRIP: 5 [PH] (ref 5–8)
PHOSPHATE SERPL-MCNC: 3.1 MG/DL (ref 2.6–4.7)
PLATELET # BLD AUTO: 319 K/UL (ref 150–400)
POTASSIUM SERPL-SCNC: 5 MMOL/L (ref 3.5–5.1)
PROT SERPL-MCNC: 6.4 G/DL (ref 6.4–8.2)
PROT UR STRIP-MCNC: 30 MG/DL
PROTHROMBIN TIME: 22.6 SEC (ref 9–11.1)
Q-T INTERVAL, ECG07: 394 MS
QRS DURATION, ECG06: 74 MS
QTC CALCULATION (BEZET), ECG08: 439 MS
RBC # BLD AUTO: 2.94 M/UL (ref 3.8–5.2)
RBC #/AREA URNS HPF: ABNORMAL /HPF (ref 0–5)
RSV RNA SPEC QL NAA+PROBE: NOT DETECTED
RV+EV RNA SPEC QL NAA+PROBE: NOT DETECTED
SERVICE CMNT-IMP: ABNORMAL
SODIUM SERPL-SCNC: 136 MMOL/L (ref 136–145)
SP GR UR REFRACTOMETRY: 1.02 (ref 1–1.03)
TROPONIN I SERPL-MCNC: <0.04 NG/ML
UROBILINOGEN UR QL STRIP.AUTO: 0.2 EU/DL (ref 0.2–1)
VENTRICULAR RATE, ECG03: 75 BPM
WBC # BLD AUTO: 10.9 K/UL (ref 3.6–11)
WBC URNS QL MICRO: ABNORMAL /HPF (ref 0–4)

## 2017-03-03 PROCEDURE — 82962 GLUCOSE BLOOD TEST: CPT

## 2017-03-03 PROCEDURE — 74011250637 HC RX REV CODE- 250/637: Performed by: FAMILY MEDICINE

## 2017-03-03 PROCEDURE — 94640 AIRWAY INHALATION TREATMENT: CPT

## 2017-03-03 PROCEDURE — 74011250637 HC RX REV CODE- 250/637: Performed by: NURSE PRACTITIONER

## 2017-03-03 PROCEDURE — 82550 ASSAY OF CK (CPK): CPT | Performed by: INTERNAL MEDICINE

## 2017-03-03 PROCEDURE — 84484 ASSAY OF TROPONIN QUANT: CPT | Performed by: INTERNAL MEDICINE

## 2017-03-03 PROCEDURE — 36415 COLL VENOUS BLD VENIPUNCTURE: CPT | Performed by: FAMILY MEDICINE

## 2017-03-03 PROCEDURE — 74011636637 HC RX REV CODE- 636/637: Performed by: INTERNAL MEDICINE

## 2017-03-03 PROCEDURE — 80076 HEPATIC FUNCTION PANEL: CPT | Performed by: INTERNAL MEDICINE

## 2017-03-03 PROCEDURE — 65660000000 HC RM CCU STEPDOWN

## 2017-03-03 PROCEDURE — 85025 COMPLETE CBC W/AUTO DIFF WBC: CPT | Performed by: INTERNAL MEDICINE

## 2017-03-03 PROCEDURE — 80048 BASIC METABOLIC PNL TOTAL CA: CPT | Performed by: INTERNAL MEDICINE

## 2017-03-03 PROCEDURE — 74011250636 HC RX REV CODE- 250/636: Performed by: INTERNAL MEDICINE

## 2017-03-03 PROCEDURE — 85610 PROTHROMBIN TIME: CPT | Performed by: FAMILY MEDICINE

## 2017-03-03 PROCEDURE — 74011250637 HC RX REV CODE- 250/637: Performed by: INTERNAL MEDICINE

## 2017-03-03 PROCEDURE — 81001 URINALYSIS AUTO W/SCOPE: CPT | Performed by: INTERNAL MEDICINE

## 2017-03-03 PROCEDURE — 83735 ASSAY OF MAGNESIUM: CPT | Performed by: INTERNAL MEDICINE

## 2017-03-03 PROCEDURE — 83036 HEMOGLOBIN GLYCOSYLATED A1C: CPT | Performed by: INTERNAL MEDICINE

## 2017-03-03 PROCEDURE — 74011636637 HC RX REV CODE- 636/637: Performed by: FAMILY MEDICINE

## 2017-03-03 PROCEDURE — 74011636637 HC RX REV CODE- 636/637: Performed by: STUDENT IN AN ORGANIZED HEALTH CARE EDUCATION/TRAINING PROGRAM

## 2017-03-03 PROCEDURE — 84100 ASSAY OF PHOSPHORUS: CPT | Performed by: INTERNAL MEDICINE

## 2017-03-03 PROCEDURE — 93005 ELECTROCARDIOGRAM TRACING: CPT

## 2017-03-03 PROCEDURE — A9270 NON-COVERED ITEM OR SERVICE: HCPCS | Performed by: INTERNAL MEDICINE

## 2017-03-03 PROCEDURE — 87798 DETECT AGENT NOS DNA AMP: CPT | Performed by: INTERNAL MEDICINE

## 2017-03-03 PROCEDURE — 74011000250 HC RX REV CODE- 250: Performed by: NURSE PRACTITIONER

## 2017-03-03 PROCEDURE — 77010033678 HC OXYGEN DAILY

## 2017-03-03 PROCEDURE — 51798 US URINE CAPACITY MEASURE: CPT

## 2017-03-03 PROCEDURE — 77030012893

## 2017-03-03 PROCEDURE — 94660 CPAP INITIATION&MGMT: CPT

## 2017-03-03 RX ORDER — WARFARIN 2 MG/1
2 TABLET ORAL EVERY EVENING
Status: COMPLETED | OUTPATIENT
Start: 2017-03-04 | End: 2017-03-05

## 2017-03-03 RX ORDER — ALBUTEROL SULFATE 0.83 MG/ML
2.5 SOLUTION RESPIRATORY (INHALATION)
Status: DISCONTINUED | OUTPATIENT
Start: 2017-03-03 | End: 2017-03-07

## 2017-03-03 RX ORDER — GUAIFENESIN 600 MG/1
600 TABLET, EXTENDED RELEASE ORAL EVERY 12 HOURS
Status: DISCONTINUED | OUTPATIENT
Start: 2017-03-03 | End: 2017-03-10 | Stop reason: HOSPADM

## 2017-03-03 RX ORDER — WARFARIN 2 MG/1
4 TABLET ORAL ONCE
Status: COMPLETED | OUTPATIENT
Start: 2017-03-03 | End: 2017-03-03

## 2017-03-03 RX ORDER — NITROGLYCERIN 0.4 MG/1
0.4 TABLET SUBLINGUAL AS NEEDED
Status: DISCONTINUED | OUTPATIENT
Start: 2017-03-03 | End: 2017-03-10 | Stop reason: HOSPADM

## 2017-03-03 RX ADMIN — Medication 10 ML: at 04:13

## 2017-03-03 RX ADMIN — Medication 10 ML: at 21:20

## 2017-03-03 RX ADMIN — ALBUTEROL SULFATE 2.5 MG: 1.25 SOLUTION RESPIRATORY (INHALATION) at 00:33

## 2017-03-03 RX ADMIN — SUCRALFATE 1 G: 1 TABLET ORAL at 06:42

## 2017-03-03 RX ADMIN — LINACLOTIDE 290 MCG: 145 CAPSULE, GELATIN COATED ORAL at 16:33

## 2017-03-03 RX ADMIN — HUMAN INSULIN 15 UNITS: 100 INJECTION, SUSPENSION SUBCUTANEOUS at 21:18

## 2017-03-03 RX ADMIN — SUCRALFATE 1 G: 1 TABLET ORAL at 21:17

## 2017-03-03 RX ADMIN — INSULIN LISPRO 7 UNITS: 100 INJECTION, SOLUTION INTRAVENOUS; SUBCUTANEOUS at 12:45

## 2017-03-03 RX ADMIN — INSULIN LISPRO 10 UNITS: 100 INJECTION, SOLUTION INTRAVENOUS; SUBCUTANEOUS at 17:32

## 2017-03-03 RX ADMIN — INSULIN LISPRO 7 UNITS: 100 INJECTION, SOLUTION INTRAVENOUS; SUBCUTANEOUS at 08:39

## 2017-03-03 RX ADMIN — GUAIFENESIN 600 MG: 600 TABLET, EXTENDED RELEASE ORAL at 18:05

## 2017-03-03 RX ADMIN — SUCRALFATE 1 G: 1 TABLET ORAL at 12:43

## 2017-03-03 RX ADMIN — ALBUTEROL SULFATE 2.5 MG: 2.5 SOLUTION RESPIRATORY (INHALATION) at 20:14

## 2017-03-03 RX ADMIN — DOXYCYCLINE HYCLATE 100 MG: 100 TABLET, COATED ORAL at 05:57

## 2017-03-03 RX ADMIN — ASPIRIN 81 MG CHEWABLE TABLET 81 MG: 81 TABLET CHEWABLE at 08:38

## 2017-03-03 RX ADMIN — HYDRALAZINE HYDROCHLORIDE 25 MG: 25 TABLET, FILM COATED ORAL at 08:37

## 2017-03-03 RX ADMIN — ISOSORBIDE MONONITRATE 120 MG: 30 TABLET ORAL at 08:38

## 2017-03-03 RX ADMIN — CARVEDILOL 25 MG: 12.5 TABLET, FILM COATED ORAL at 08:38

## 2017-03-03 RX ADMIN — SUCRALFATE 1 G: 1 TABLET ORAL at 16:32

## 2017-03-03 RX ADMIN — INSULIN LISPRO 12 UNITS: 100 INJECTION, SOLUTION INTRAVENOUS; SUBCUTANEOUS at 21:18

## 2017-03-03 RX ADMIN — Medication 10 ML: at 16:39

## 2017-03-03 RX ADMIN — INSULIN LISPRO 30 UNITS: 100 INJECTION, SUSPENSION SUBCUTANEOUS at 12:45

## 2017-03-03 RX ADMIN — DOXYCYCLINE HYCLATE 100 MG: 100 TABLET, COATED ORAL at 21:17

## 2017-03-03 RX ADMIN — ALBUTEROL SULFATE 2.5 MG: 2.5 SOLUTION RESPIRATORY (INHALATION) at 07:29

## 2017-03-03 RX ADMIN — HUMAN INSULIN 15 UNITS: 100 INJECTION, SUSPENSION SUBCUTANEOUS at 08:40

## 2017-03-03 RX ADMIN — ALBUTEROL SULFATE 2.5 MG: 2.5 SOLUTION RESPIRATORY (INHALATION) at 04:44

## 2017-03-03 RX ADMIN — Medication 5 ML: at 16:39

## 2017-03-03 RX ADMIN — FLUTICASONE PROPIONATE 2 SPRAY: 50 SPRAY, METERED NASAL at 10:00

## 2017-03-03 RX ADMIN — INSULIN LISPRO 30 UNITS: 100 INJECTION, SUSPENSION SUBCUTANEOUS at 08:40

## 2017-03-03 RX ADMIN — ATORVASTATIN CALCIUM 80 MG: 20 TABLET, FILM COATED ORAL at 17:32

## 2017-03-03 RX ADMIN — ALBUTEROL SULFATE 2.5 MG: 2.5 SOLUTION RESPIRATORY (INHALATION) at 16:53

## 2017-03-03 RX ADMIN — INSULIN LISPRO 30 UNITS: 100 INJECTION, SUSPENSION SUBCUTANEOUS at 17:33

## 2017-03-03 RX ADMIN — DOCUSATE SODIUM 100 MG: 100 CAPSULE ORAL at 21:17

## 2017-03-03 RX ADMIN — OXYCODONE HYDROCHLORIDE AND ACETAMINOPHEN 1 TABLET: 5; 325 TABLET ORAL at 13:47

## 2017-03-03 RX ADMIN — PANTOPRAZOLE SODIUM 40 MG: 40 TABLET, DELAYED RELEASE ORAL at 06:00

## 2017-03-03 RX ADMIN — PREDNISONE 40 MG: 20 TABLET ORAL at 08:38

## 2017-03-03 RX ADMIN — CARVEDILOL 25 MG: 12.5 TABLET, FILM COATED ORAL at 16:33

## 2017-03-03 RX ADMIN — LACTULOSE 20 G: 10 SOLUTION ORAL at 16:33

## 2017-03-03 RX ADMIN — WARFARIN SODIUM 4 MG: 2 TABLET ORAL at 17:32

## 2017-03-03 RX ADMIN — ALBUTEROL SULFATE 2.5 MG: 2.5 SOLUTION RESPIRATORY (INHALATION) at 11:17

## 2017-03-03 RX ADMIN — Medication 10 ML: at 05:57

## 2017-03-03 RX ADMIN — HYDRALAZINE HYDROCHLORIDE 25 MG: 25 TABLET, FILM COATED ORAL at 16:33

## 2017-03-03 RX ADMIN — ONDANSETRON 4 MG: 2 INJECTION INTRAMUSCULAR; INTRAVENOUS at 13:47

## 2017-03-03 RX ADMIN — HYDRALAZINE HYDROCHLORIDE 25 MG: 25 TABLET, FILM COATED ORAL at 21:17

## 2017-03-03 NOTE — PROGRESS NOTES
Pt presents with HCAP (healthcare-associated pneumonia)   Days in  LOS at this time is 3    Problem List  Date Reviewed: 3/2/2017          Codes Class Noted    Acute exacerbation of chronic obstructive pulmonary disease (COPD) ([facility] ICD-10-CM: J44.1  ICD-9-CM: 491.21  3/1/2017        * (Principal)HCAP (healthcare-associated pneumonia) ICD-10-CM: J18.9  ICD-9-CM: 486  2/28/2017        CKD (chronic kidney disease) stage 4, GFR 15-29 ml/min (HCC) (Chronic) ICD-10-CM: N18.4  ICD-9-CM: 585.4  2/10/2017        Acute and chronic respiratory failure with hypoxia (HCC) ICD-10-CM: J96.21  ICD-9-CM: 518.84, 799.02  2/10/2017        DVT (deep venous thrombosis) ([facility] ICD-10-CM: I82.409  ICD-9-CM: 453.40  2/2/2017        DM (diabetes mellitus), type 2 with renal complications (HCC) (Chronic) ICD-10-CM: E11.29  ICD-9-CM: 250.40  8/9/2013        Vitamin D deficiency ICD-10-CM: E55.9  ICD-9-CM: 268.9  8/9/2013        Angina, class III ([facility] ICD-10-CM: I20.9  ICD-9-CM: 413.9  8/9/2013        Normocytic anemia (Chronic) ICD-10-CM: D64.9  ICD-9-CM: 285.9  5/26/2013        DJD (degenerative joint disease) of knee ICD-10-CM: M17.9  ICD-9-CM: 715.36  10/30/2012        Chronic diastolic heart failure (HCC) (Chronic) ICD-10-CM: I50.32  ICD-9-CM: 428.32  10/5/2012        HTN (hypertension) (Chronic) ICD-10-CM: I10  ICD-9-CM: 401.9  10/1/2012        Morbid obesity (Chronic) ICD-10-CM: E66.01  ICD-9-CM: 278.01  10/1/2012        Esophageal reflux ICD-10-CM: K21.9  ICD-9-CM: 530.81  10/1/2012        Gastroparesis ICD-10-CM: K31.84  ICD-9-CM: 536.3  10/1/2012        Pulmonary HTN (HCC) (Chronic) ICD-10-CM: I27.2  ICD-9-CM: 416.8  3/21/2012        Interstitial lung disease ([address] (Chronic) ICD-10-CM: J84.9  ICD-9-CM: 747  2/28/2011        CAD (coronary Artery Disease) Native Artery (Chronic) ICD-10-CM: I25.10  ICD-9-CM: 414.01  2/16/2011    Overview Addendum 10/5/2012  7:33 AM by PRASHANT Joiner 2004  1v CAD by cath - PCI OM with SAL  Cath 5/2004 - patent stent, no new disease  Lexiscan cardiolite 12/09- normal perfusion, EF 66%  Cath: 3/19/12: PA 55/30 mean 41, wedge 26, RA 24, EDP 35, LVG 55%, LM normal, LAD normal, LCX - OM1 patent stent, OM2 prox 85% long, % with L to R collaterals                JASEN on CPAP (Chronic) ICD-10-CM: G47.33  ICD-9-CM: 327.23  2/16/2011    Overview Signed 3/21/2012  8:04 AM by Slime Prajapati ACNP     Dr. Nataly Olivier CPAP                   Principal Problem:    HCAP (healthcare-associated pneumonia) (2/28/2017)    Active Problems:    CAD (coronary Artery Disease) Native Artery (2/16/2011)      Overview: [place] 2004      1v CAD by cath - PCI OM with SAL      Cath 5/2004 - patent stent, no new disease      Lexiscan cardiolite 12/09- normal perfusion, EF 66%      Cath: 3/19/12: PA 55/30 mean 41, wedge 26, RA 24, EDP 35, LVG 55%, LM       normal, LAD normal, LCX - OM1 patent stent, OM2 prox 85% long, %       with L to R collaterals             Interstitial lung disease ([address] (2/28/2011)      Pulmonary HTN ([address] (3/21/2012)      HTN (hypertension) (10/1/2012)      Morbid obesity (10/1/2012)      Chronic diastolic heart failure ([address] (10/5/2012)      DJD (degenerative joint disease) of knee (10/30/2012)      DM (diabetes mellitus), type 2 with renal complications ([address] (0/8/3278)      DVT (deep venous thrombosis) ([address] (2/2/2017)      CKD (chronic kidney disease) stage 4, GFR 15-29 ml/min ([facility]) (2/10/2017)      Acute and chronic respiratory failure with hypoxia ([facility]) (2/10/2017)      Acute exacerbation of chronic obstructive pulmonary disease (COPD) ([address] (3/1/2017)        Pt cardiac rhythm at this time is  NSR.      Last documented Troponin   Recent Labs      03/01/17   1033   TROIQ  <0.04       Pt last three weights    Last 3 Recorded Weights in this Encounter    03/01/17 1438 03/02/17 0506 03/03/17 0437   Weight: 147.7 kg (325 lb 11.2 oz) 148.4 kg (327 lb 3.2 oz) 150.5 kg (331 lb 12.7 oz)    last weighed via   gained, 2 lbs. .     Pt has the following consults Palliative Care    Pt is currently taking   Current Facility-Administered Medications   Medication Dose Route Frequency    albuterol (PROVENTIL VENTOLIN) nebulizer solution 2.5 mg  2.5 mg Nebulization Q4H RT    guaiFENesin SR (MUCINEX) tablet 600 mg  600 mg Oral Q12H    insulin lispro protamine/insulin lispro (HUMALOG MIX 75/25) injection 30 Units  30 Units SubCUTAneous TIDPC    warfarin (COUMADIN) tablet 4 mg  4 mg Oral ONCE    [START ON 3/4/2017] warfarin (COUMADIN) tablet 2 mg  2 mg Oral QPM    acetaminophen (TYLENOL) tablet 650 mg  650 mg Oral Q6H    benzonatate (TESSALON) capsule 100 mg  100 mg Oral TID PRN    fluticasone (FLONASE) 50 mcg/actuation nasal spray 2 Spray  2 Spray Both Nostrils DAILY    doxycycline (VIBRA-TABS) tablet 100 mg  100 mg Oral Q12H    insulin NPH (NOVOLIN N, HUMULIN N) injection 15 Units  15 Units SubCUTAneous Q12H    ondansetron (ZOFRAN) injection 4 mg  4 mg IntraVENous Q8H PRN    predniSONE (DELTASONE) tablet 40 mg  40 mg Oral DAILY WITH BREAKFAST    isosorbide mononitrate ER (IMDUR) tablet 120 mg  120 mg Oral DAILY    oxyCODONE-acetaminophen (PERCOCET) 5-325 mg per tablet 1 Tab  1 Tab Oral Q8H PRN    polyethylene glycol (MIRALAX) packet 17 g  17 g Oral DAILY PRN    sodium chloride (NS) flush 5-10 mL  5-10 mL IntraVENous Q8H    sodium chloride (NS) flush 5-10 mL  5-10 mL IntraVENous PRN    sodium chloride (NS) flush 5-10 mL  5-10 mL IntraVENous Q8H    sodium chloride (NS) flush 5-10 mL  5-10 mL IntraVENous PRN    insulin lispro (HUMALOG) injection   SubCUTAneous AC&HS    glucose chewable tablet 16 g  4 Tab Oral PRN    dextrose (D50W) injection syrg 12.5-25 g  12.5-25 g IntraVENous PRN    glucagon (GLUCAGEN) injection 1 mg  1 mg IntraMUSCular PRN    docusate sodium (COLACE) capsule 100 mg  100 mg Oral QHS    hydrALAZINE (APRESOLINE) tablet 25 mg  25 mg Oral TID    sucralfate (CARAFATE) tablet 1 g  1 g Oral AC&HS    carvedilol (COREG) tablet 25 mg  25 mg Oral BID WITH MEALS    pantoprazole (PROTONIX) tablet 40 mg  40 mg Oral ACB    aspirin chewable tablet 81 mg  81 mg Oral DAILY    atorvastatin (LIPITOR) tablet 80 mg  80 mg Oral QPM    Warfarin- Pharmacy Dosing   Other Rx Dosing/Monitoring    umeclidinium-vilanterol (ANORO ELLIPTA) 62.5 mcg- 25 mcg/inhalation  1 Puff Inhalation ACL       Meds held in the past 12 HOURS hours are: n/a  Due to: no change    UPDATE INTAKE AND OUTPUT    Intake/Output Summary (Last 24 hours) at 03/03/17 1231  Last data filed at 03/03/17 0559   Gross per 24 hour   Intake              120 ml   Output              350 ml   Net             -230 ml       12 HOURS CHART SIGN OFF DONE BY Manju Ramirez RN        Patient VERBALexpected daily goal for today is:    1. Patient and family agreeable to plan to consult Palliative Care. 2. Patient will have Bilateral dopplers of lower extremities. Nurse goal for patient today:  Continue plan of care, follow up on Monday.

## 2017-03-03 NOTE — PROGRESS NOTES
[facility] MEDICINE RESIDENCY PROGRAM  PROGRESS NOTE     3/2/2017  PCP: Wang Iraheta, DO     Assessment/Plan:   David Rabago is a 64 yo female PMH of HFpEF, HTN, CAD s/p stent, DVT, CKD4, COPD on 2LNC, GERD, IDDM admitted with HCAP.     HCAP:SIRS 0/4. No signs of pneumonia. CXR unchanged from previous admission. Cefepime discontinued. Blood culture neg x 48 hours. Patient is diagnosed with acute exacerbation of  COPD likely triggered by a viral infection ( acute bronchitis). Pt stable on baseline of 2LNC. Afebrile and no leukocytosis. on prednisone taper. Patient expressed desire to be seen by a pulmonologist. Saul Anna was consulted    Acute exacerbation of COPD: POA, Improved with breathing treatment and steroids.   - Continue prednisone taper.   - care was transferred to hospitalist on patient 's request.      Acute on Chronic Hypoxemic Respiratory Failure in the setting of COPD/morbid obesity requiring 2L oxygen at baseline: stable   -continue O2 supplementation. Wean as tolerated      Left sided Chest Pain: Troponin negative x3. Chest wall pain reproducible on palpation. Cardio was curbsided  Does not think this is cardiac. Possible musculoskeletal   - f/u with your cardiologist  - tylenol prn      Hx of DVT:  Diagnosed on 2/3. Pt on coumadin and INR therapeutic at 2.3 on admission. V/q scan Showed intermediate probability for PE. Patient not tachypneic or tachycardic. No further work-up.   -Continue with coumadin  - f/u with PCP          HFpEF: chronic, LVEF 75% stable. Pro-BNP mildly elevated at 164.  -Continue coreg 25mg daily   - bumex discontinued due to increased creatinine  -Daily weights and strict I&O      HTN: BP stable. Bumex was discontinued due to kidney functions.   -Continue coreg 25mg daily hydralazine 25mg daily, imdur 120mg daily   -Monitor BP closely       CKD Stage 4: Cr on admission 3.89. Increased to 4.25 this am. Baseline of 3.3 Patient reports baseline of 1.8.  Bumex was discontinued. Patient requested a nephrology consult. Sees Dr. Alejandro Shelton. We consulted Dr. Alejandro Shelton  - f/u with Nephro      IDDM: continue home insulin 70/30 SSI  - Continue your sliding scale and adjust to steroids taper  - f/u with your PCP      CAD s/p stent:  troponin negativex3. -ASA 81mg daily     Constipation: Chronic problem.  -Continue Dulcolax       GERD: Stable. -Continue protonix 40mg daily  -Continue Carafate 1g QID       Hyperlipidemia: Stable  -Continue home Lovastatin 80mg daily       Morbid Obesity: BMI 46.73   -Encourage weight loss       CODE STATUS:  FULL CODE      Pt to be discussed with Attending physician       Subjective:   Pt was seen and examined at bedside. Afebrile and hemodynamically stable. Concerns overnight include: none. CP has improved. Breathing is improved. Still SOB. Denies nausea, vomiting, abdominal pain, dizziness. Objective:   Physical examination  Visit Vitals    BP (!) 167/92 (BP 1 Location: Left arm, BP Patient Position: At rest)    Pulse 89    Temp 98.9 °F (37.2 °C)    Resp 20    Ht 5' 10\" (1.778 m)    Wt 327 lb 3.2 oz (148.4 kg)    SpO2 98%    BMI 46.95 kg/m2      Temp (24hrs), Av.4 °F (36.9 °C), Min:97.9 °F (36.6 °C), Max:98.9 °F (37.2 °C)     O2 Flow Rate (L/min): 2 l/min   O2 Device: Nasal cannula      Date 17 - 17 0617 07 - 17 0659   Shift 8727-2868 24 Hour Total 7128-7348 8335-4409 24 Hour Total   I  N  T  A  K  E   P.O. 400 640 240  240      P. O. 400 640 240  240    I.V.  (mL/kg/hr) 500 600         I. V. 500 500         Volume (cefepime (MAXIPIME) 2 g in 0.9% sodium chloride (MBP/ADV) 100 mL)  100       Shift Total  (mL/kg) 900  (6.1) 1240  (8.4) 240  (1.6)  240  (1.6)   O  U  T  P  U  T   Urine  (mL/kg/hr) 1200 1200 500  (0.3)  500      Urine Voided 1200 1200 500  500    Shift Total  (mL/kg) 1200  (8.1) 1200  (8.1) 500  (3.4)  500  (3.4)   NET -300 40 -260  -260   Weight (kg) 148. 4 148. 4 148. 4 148. 4 148.4 Last 3 shifts:    03/01 0701 - 03/02 1900  In: 1480 [P.O.:880; I.V.:600]  Out: 1700 [Urine:1700]    General:   Alert, cooperative, no acute distress   Head:   Atraumatic   Eyes:   Conjunctivae clear   ENT:  Oral mucosa normal   Neck:  Supple, trachea midline, no adenopathy   No JVD   Back:    No CVA tenderness    Chest wall:    No tenderness or deformities    Lungs:   Clear to auscultation bilaterally    Heart:   Regular rhythm, no murmur   Abdomen:    Soft, non-tender   No masses or organomegaly    Extremities:  No edema or DVT signs   Pulses:  Symmetric all extremities   Skin:  Warm and dry    No rashes or lesions   Neurologic:  Oriented   No focal deficits         Data Review:     Recent Labs      03/02/17 0442 03/01/17 0431 02/28/17   1255   WBC  6.1  6.8  6.6   HGB  9.5*  9.4*  9.2*   HCT  28.9*  29.0*  30.2*   PLT  327  282  300     Recent Labs      03/02/17 0442 03/01/17 0431 02/28/17   1255   NA  136  140  141   K  4.9  4.7  4.5   CL  100  105  106   CO2  23  28  27   GLU  425*  172*  204*   BUN  47*  40*  38*   CREA  4.24*  3.92*  3.89*   CA  8.1*  8.3*  8.9   ALB  2.9*  3.0*  3.1*   TBILI  0.4  0.6  0.4   SGOT  7*  22  13*   ALT  20  19  20   INR  2.3*  2.4*  2.3*     Medications reviewed  Current Facility-Administered Medications   Medication Dose Route Frequency    acetaminophen (TYLENOL) tablet 650 mg  650 mg Oral Q6H    albuterol (ACCUNEB) nebulizer solution 2.5 mg  2.5 mg Nebulization Q4H RT    benzonatate (TESSALON) capsule 100 mg  100 mg Oral TID PRN    [START ON 3/3/2017] fluticasone (FLONASE) 50 mcg/actuation nasal spray 2 Spray  2 Spray Both Nostrils DAILY    doxycycline (VIBRA-TABS) tablet 100 mg  100 mg Oral Q12H    insulin lispro protamine/insulin lispro (HUMALOG MIX 75/25) injection 30 Units  30 Units SubCUTAneous BID WITH MEALS    insulin NPH (NOVOLIN N, HUMULIN N) injection 15 Units  15 Units SubCUTAneous Q12H    ondansetron (ZOFRAN) injection 4 mg  4 mg IntraVENous Q8H PRN    predniSONE (DELTASONE) tablet 40 mg  40 mg Oral DAILY WITH BREAKFAST    isosorbide mononitrate ER (IMDUR) tablet 120 mg  120 mg Oral DAILY    oxyCODONE-acetaminophen (PERCOCET) 5-325 mg per tablet 1 Tab  1 Tab Oral Q8H PRN    polyethylene glycol (MIRALAX) packet 17 g  17 g Oral DAILY PRN    sodium chloride (NS) flush 5-10 mL  5-10 mL IntraVENous Q8H    sodium chloride (NS) flush 5-10 mL  5-10 mL IntraVENous PRN    sodium chloride (NS) flush 5-10 mL  5-10 mL IntraVENous Q8H    sodium chloride (NS) flush 5-10 mL  5-10 mL IntraVENous PRN    insulin lispro (HUMALOG) injection   SubCUTAneous AC&HS    glucose chewable tablet 16 g  4 Tab Oral PRN    dextrose (D50W) injection syrg 12.5-25 g  12.5-25 g IntraVENous PRN    glucagon (GLUCAGEN) injection 1 mg  1 mg IntraMUSCular PRN    docusate sodium (COLACE) capsule 100 mg  100 mg Oral QHS    hydrALAZINE (APRESOLINE) tablet 25 mg  25 mg Oral TID    sucralfate (CARAFATE) tablet 1 g  1 g Oral AC&HS    carvedilol (COREG) tablet 25 mg  25 mg Oral BID WITH MEALS    pantoprazole (PROTONIX) tablet 40 mg  40 mg Oral ACB    aspirin chewable tablet 81 mg  81 mg Oral DAILY    atorvastatin (LIPITOR) tablet 80 mg  80 mg Oral QPM    Warfarin- Pharmacy Dosing   Other Rx Dosing/Monitoring    umeclidinium-vilanterol (ANORO ELLIPTA) 62.5 mcg- 25 mcg/inhalation  1 Puff Inhalation ACL         Signed:   Elena Link MD   Resident, Family Medicine      Attending note: Attending note to follow. ..

## 2017-03-03 NOTE — PROGRESS NOTES
Bedside and Verbal shift change report given to Terrell Leblanc (oncoming nurse) by Crystal Perkins RN (offgoing nurse). Report included the following information SBAR and Kardex.

## 2017-03-03 NOTE — PROGRESS NOTES
[address] [facility] [place]  YOB: 1955          Assessment & Plan:   1. ROSEANN on CKD 4  - CR 3.3 2 20 17  - h/o recurrent ROSEANN s with loops: pre renal  - cr better off loops but will need to resume them eventually  - poor taste in mouth :? CPAP related , less likely uremia  - CKD due to DM,HTN  ( )  - UA : No hematuria  - get PTH in am  2. ILD  - Serologies pending but had biopsy proven Fibrosis due to smoking  3. HTN  - Coreg,Hydralazine  4. DM  - Insulin  5. Res failure  - due to Obesity. ILD,Some volume  - sob might be also due to anemia  6. Anemia  - get Iron panel       Subjective:   CC:ckd  HPI: Patient seen   ROSEANN on ckd: cr is better  Wt is up by 7 lbs per pt. Stable chronic SOB.   Some bad taste in mouth  ROS:cp better  No v/d  Current Facility-Administered Medications   Medication Dose Route Frequency    albuterol (PROVENTIL VENTOLIN) nebulizer solution 2.5 mg  2.5 mg Nebulization Q4H RT    guaiFENesin SR (MUCINEX) tablet 600 mg  600 mg Oral Q12H    insulin lispro protamine/insulin lispro (HUMALOG MIX 75/25) injection 30 Units  30 Units SubCUTAneous TIDPC    warfarin (COUMADIN) tablet 4 mg  4 mg Oral ONCE    [START ON 3/4/2017] warfarin (COUMADIN) tablet 2 mg  2 mg Oral QPM    acetaminophen (TYLENOL) tablet 650 mg  650 mg Oral Q6H    benzonatate (TESSALON) capsule 100 mg  100 mg Oral TID PRN    fluticasone (FLONASE) 50 mcg/actuation nasal spray 2 Spray  2 Spray Both Nostrils DAILY    doxycycline (VIBRA-TABS) tablet 100 mg  100 mg Oral Q12H    insulin NPH (NOVOLIN N, HUMULIN N) injection 15 Units  15 Units SubCUTAneous Q12H    ondansetron (ZOFRAN) injection 4 mg  4 mg IntraVENous Q8H PRN    predniSONE (DELTASONE) tablet 40 mg  40 mg Oral DAILY WITH BREAKFAST    isosorbide mononitrate ER (IMDUR) tablet 120 mg  120 mg Oral DAILY    oxyCODONE-acetaminophen (PERCOCET) 5-325 mg per tablet 1 Tab  1 Tab Oral Q8H PRN    polyethylene glycol (MIRALAX) packet 17 g  17 g Oral DAILY PRN    sodium chloride (NS) flush 5-10 mL  5-10 mL IntraVENous Q8H    sodium chloride (NS) flush 5-10 mL  5-10 mL IntraVENous PRN    sodium chloride (NS) flush 5-10 mL  5-10 mL IntraVENous Q8H    sodium chloride (NS) flush 5-10 mL  5-10 mL IntraVENous PRN    insulin lispro (HUMALOG) injection   SubCUTAneous AC&HS    glucose chewable tablet 16 g  4 Tab Oral PRN    dextrose (D50W) injection syrg 12.5-25 g  12.5-25 g IntraVENous PRN    glucagon (GLUCAGEN) injection 1 mg  1 mg IntraMUSCular PRN    docusate sodium (COLACE) capsule 100 mg  100 mg Oral QHS    hydrALAZINE (APRESOLINE) tablet 25 mg  25 mg Oral TID    sucralfate (CARAFATE) tablet 1 g  1 g Oral AC&HS    carvedilol (COREG) tablet 25 mg  25 mg Oral BID WITH MEALS    pantoprazole (PROTONIX) tablet 40 mg  40 mg Oral ACB    aspirin chewable tablet 81 mg  81 mg Oral DAILY    atorvastatin (LIPITOR) tablet 80 mg  80 mg Oral QPM    Warfarin- Pharmacy Dosing   Other Rx Dosing/Monitoring    umeclidinium-vilanterol (ANORO ELLIPTA) 62.5 mcg- 25 mcg/inhalation  1 Puff Inhalation ACL          Objective:     Vitals:  Blood pressure 141/77, pulse 89, temperature 98.2 °F (36.8 °C), resp. rate 18, height 5' 10\" (1.778 m), weight 150.5 kg (331 lb 12.7 oz), SpO2 99 %. Temp (24hrs), Av.3 °F (36.8 °C), Min:98 °F (36.7 °C), Max:98.9 °F (37.2 °C)      Intake and Output:      1901 -  0700  In: 1209 [P.O.:760; I.V.:500]  Out:  [Urine:]    Physical Exam:                Patient is intubated:  no    Physical Examination:   GENERAL ASSESSMENT: NAD  HEENT:Nontraumatic   CHEST: Crackles  HEART: S1S2  ABDOMEN: Soft,NT,  :Myers: n  EXTREMITY: 1 EDEMA  NEURO:Grossly non focal          ECG/rhythm:    Data Review      No results for input(s): TNIPOC in the last 72 hours.     No lab exists for component: ITNL   Recent Labs      17   1033  17   0431  17   1255   CPK   --    --   81 CKMB   --    --   <1.0   TROIQ  <0.04  <0.04  <0.04     Recent Labs      03/03/17 0413 03/02/17 0442 03/01/17   0431   NA  136  136  140   K  5.0  4.9  4.7   CL  101  100  105   CO2  24  23  28   BUN  54*  47*  40*   CREA  3.99*  4.24*  3.92*   GLU  338*  425*  172*   PHOS  3.1   --    --    MG  1.1*   --    --    CA  7.9*  8.1*  8.3*   ALB  2.8*  2.9*  3.0*   WBC  10.9  6.1  6.8   HGB  8.8*  9.5*  9.4*   HCT  27.2*  28.9*  29.0*   PLT  319  327  282      Recent Labs      03/03/17 0413 03/02/17 0442 03/01/17 0431 02/28/17   1255   INR  2.2*  2.3*  2.4*  2.3*   PTP  22.6*  23.5*  24.7*  23.8*   APTT   --    --    --   34.8*     Needs: urine analysis, urine sodium, protein and creatinine  Lab Results   Component Value Date/Time    Sodium urine, random 25 11/10/2014 12:40 PM    Creatinine, urine 115.07 02/04/2017 09:33 AM         Discussed with:  Family, pt    : Carly Amaya MD  3/3/2017        [facility]:  [email or web address]  [place] office:  [address] [place] [address],[address]  [place], [address] [facility]  Phone: [phone]  Fax :     [phone]    [place] office:  [address] [facility], [address] [place] [address]  Phone - [phone]  Fax - [phone]

## 2017-03-03 NOTE — PROGRESS NOTES
Name: Tamar Blackmanwith: [address]   : 1955 Admit Date: 2017   Phone: [phone]  Room: [facility]/   PCP: Marely Mcleod DO  MRN: [id number]   Date: 3/3/2017  Code: Full Code        HPI:      Chart and notes reviewed. Data reviewed. I review the patient's current medications in the medical record at each encounter.  I have evaluated and examined the patient. Overnight events:  States she does not feel \"right\". Worried about the fact that DVT not seen on this admission. Feels anxious.    99% on 2 L  BP stable and not tachycardic  Has been on [facility] since   Has not had a RHC in the last 5 years, if ever  CT reviewed and appears stable compared to  and some mild GGO when compared to       Past Medical History:   Diagnosis Date     Sleep Apnea 2011    Angina, class III ([address] 2013    Aortic aneurysm ([address]     CAD (coronary Artery Disease) Native Artery 2011    CKD (chronic kidney disease) stage 4, GFR 15-29 ml/min ([address] 2/10/2017    Diabetic gastroparesis (HCC)     Diastolic heart failure ([address] 10/5/2012    Esophageal stricture     dialted Dr. Karis Borden G6PD deficiency ([address]     trait    GERD (gastroesophageal reflux disease)     Hypertensive Cardiovasc Dis Benign, No CHF 2011    ILD (interstitial lung disease) ([address]     open lung bx CJW     OA (osteoarthritis)     Obesity 2011    Ovarian cancer ([address]     cervical and uterine    Rheumatoid arteritis ([address]     T2DM (type 2 diabetes mellitus) ([address] 2013    Tobacco use disorder 3/21/2012    Uterine cervix cancer ([address]     Vitamin D deficiency 2013       Past Surgical History:   Procedure Laterality Date    CARDIAC SURG PROCEDURE UNLIST      stents    COLONOSCOPY N/A 2016    COLONOSCOPY performed by Adelaida Buchanan MD at [facility] HX APPENDECTOMY      HX CARPAL TUNNEL RELEASE      bilateral    HX CHOLECYSTECTOMY      HX HERNIA REPAIR      HX HYSTERECTOMY      HX ORTHOPAEDIC  11/12/12    right knee replacement    HX TONSIL AND ADENOIDECTOMY      UPPER GI ENDOSCOPY,VINOD W GUIDE  6/24/2016            Family History   Problem Relation Age of Onset    Heart Disease Mother     Heart Disease Brother        Social History   Substance Use Topics    Smoking status: Former Smoker     Packs/day: 0.50     Years: 41.00     Types: Cigarettes     Quit date: 11/9/2014    Smokeless tobacco: Never Used    Alcohol use No       Allergies   Allergen Reactions    Contrast Dye [Iodine] Anaphylaxis    Levaquin [Levofloxacin] Nausea and Vomiting    Morphine Hives and Itching       Current Facility-Administered Medications   Medication Dose Route Frequency    albuterol (PROVENTIL VENTOLIN) nebulizer solution 2.5 mg  2.5 mg Nebulization Q4H RT    guaiFENesin SR (MUCINEX) tablet 600 mg  600 mg Oral Q12H    insulin lispro protamine/insulin lispro (HUMALOG MIX 75/25) injection 30 Units  30 Units SubCUTAneous TIDPC    warfarin (COUMADIN) tablet 4 mg  4 mg Oral ONCE    [START ON 3/4/2017] warfarin (COUMADIN) tablet 2 mg  2 mg Oral QPM    nitroglycerin (NITROSTAT) tablet 0.4 mg  0.4 mg SubLINGual PRN    linaclotide (LINZESS) capsule 290 mcg  290 mcg Oral ACB    [START ON 3/4/2017] tiotropium-olodaterol 2.5-2.5 mcg/actuation mist 2 Puff  2 Puff Inhalation ACL    lactulose (CHRONULAC) solution 20 g  20 g Oral ONCE    acetaminophen (TYLENOL) tablet 650 mg  650 mg Oral Q6H    benzonatate (TESSALON) capsule 100 mg  100 mg Oral TID PRN    fluticasone (FLONASE) 50 mcg/actuation nasal spray 2 Spray  2 Spray Both Nostrils DAILY    doxycycline (VIBRA-TABS) tablet 100 mg  100 mg Oral Q12H    insulin NPH (NOVOLIN N, HUMULIN N) injection 15 Units  15 Units SubCUTAneous Q12H    ondansetron (ZOFRAN) injection 4 mg  4 mg IntraVENous Q8H PRN    predniSONE (DELTASONE) tablet 40 mg  40 mg Oral DAILY WITH BREAKFAST    isosorbide mononitrate ER (IMDUR) tablet 120 mg  120 mg Oral DAILY    oxyCODONE-acetaminophen (PERCOCET) 5-325 mg per tablet 1 Tab  1 Tab Oral Q8H PRN    polyethylene glycol (MIRALAX) packet 17 g  17 g Oral DAILY PRN    sodium chloride (NS) flush 5-10 mL  5-10 mL IntraVENous Q8H    sodium chloride (NS) flush 5-10 mL  5-10 mL IntraVENous PRN    sodium chloride (NS) flush 5-10 mL  5-10 mL IntraVENous Q8H    sodium chloride (NS) flush 5-10 mL  5-10 mL IntraVENous PRN    insulin lispro (HUMALOG) injection   SubCUTAneous AC&HS    glucose chewable tablet 16 g  4 Tab Oral PRN    dextrose (D50W) injection syrg 12.5-25 g  12.5-25 g IntraVENous PRN    glucagon (GLUCAGEN) injection 1 mg  1 mg IntraMUSCular PRN    docusate sodium (COLACE) capsule 100 mg  100 mg Oral QHS    hydrALAZINE (APRESOLINE) tablet 25 mg  25 mg Oral TID    sucralfate (CARAFATE) tablet 1 g  1 g Oral AC&HS    carvedilol (COREG) tablet 25 mg  25 mg Oral BID WITH MEALS    pantoprazole (PROTONIX) tablet 40 mg  40 mg Oral ACB    aspirin chewable tablet 81 mg  81 mg Oral DAILY    atorvastatin (LIPITOR) tablet 80 mg  80 mg Oral QPM    Warfarin- Pharmacy Dosing   Other Rx Dosing/Monitoring         REVIEW OF SYSTEMS   Negative except as stated in the HPI. Physical Exam:   Visit Vitals    /70 (BP 1 Location: Right arm, BP Patient Position: Sitting)    Pulse 86    Temp 97.8 °F (36.6 °C)    Resp 18    Ht 5' 10\" (1.778 m)    Wt 150.5 kg (331 lb 12.7 oz)    SpO2 98%    BMI 47.61 kg/m2       General:  Alert, cooperative, no distress, appears stated age. Head:  Normocephalic, without obvious abnormality, atraumatic. Eyes:  Conjunctivae/corneas clear. Nose: Nares normal. Septum midline. Mucosa normal.    Throat: Lips, mucosa, and tongue normal. Teeth and gums normal.   Neck: Supple, symmetrical, trachea midline, no adenopathy   Lungs:   Diminished bs in the bases, otherwise clear. Chest wall:  No tenderness or deformity.    Heart:  Regular rate and rhythm, S1, S2 normal, no murmur, click, rub or gallop. Abdomen:   Soft, non-tender, obese. Bowel sounds normal.    Extremities: Extremities normal, atraumatic, no cyanosis, LEs tender to touch   Pulses: 2+ and symmetric all extremities. Skin: Skin color, texture, turgor normal. No rashes or lesions   Lymph nodes: Cervical nodes normal.   Neurologic: Grossly nonfocal       Lab Results   Component Value Date/Time    Sodium 136 03/03/2017 04:13 AM    Potassium 5.0 03/03/2017 04:13 AM    Chloride 101 03/03/2017 04:13 AM    CO2 24 03/03/2017 04:13 AM    BUN 54 03/03/2017 04:13 AM    Creatinine 3.99 03/03/2017 04:13 AM    Glucose 338 03/03/2017 04:13 AM    Calcium 7.9 03/03/2017 04:13 AM    Magnesium 1.1 03/03/2017 04:13 AM    Phosphorus 3.1 03/03/2017 04:13 AM    Lactic acid 1.2 02/02/2017 03:20 PM       Lab Results   Component Value Date/Time    WBC 10.9 03/03/2017 04:13 AM    HGB 8.8 03/03/2017 04:13 AM    PLATELET 456 67/64/7903 04:13 AM    MCV 92.5 03/03/2017 04:13 AM       Lab Results   Component Value Date/Time    INR 2.2 03/03/2017 04:13 AM    aPTT 34.8 02/28/2017 12:55 PM    AST (SGOT) 7 03/03/2017 04:13 AM    Alk.  phosphatase 67 03/03/2017 04:13 AM    Protein, total 6.4 03/03/2017 04:13 AM    Albumin 2.8 03/03/2017 04:13 AM    Globulin 3.6 03/03/2017 04:13 AM       Lab Results   Component Value Date/Time    Iron 29 11/09/2014 03:30 AM    TIBC 245 11/09/2014 03:30 AM    Iron % saturation 12 11/09/2014 03:30 AM    Ferritin 255 11/09/2014 04:00 AM       No results found for: SR, CRP, GLENNY, ANAIGG, RA, RPR, RPRT, VDRLT, VDRLS, TSH, TSHEXT, TSHEXT     No results found for: PH, PHI, PCO2, PCO2I, PO2, PO2I, HCO3, HCO3I, FIO2, FIO2I    Lab Results   Component Value Date/Time    CK 81 02/28/2017 12:55 PM    CK-MB Index Cannot be calulated 02/28/2017 12:55 PM    Troponin-I, Qt. <0.04 03/01/2017 10:33 AM        Lab Results   Component Value Date/Time    Culture result: NO GROWTH 3 DAYS 02/28/2017 04:26 PM    Culture result: HEAVY  NORMAL RESPIRATORY WAGNER   02/03/2017 04:10 PM    Culture result: MODERATE  CANDIDA TROPICALIS   02/03/2017 04:10 PM    Culture result:  02/03/2017 04:10 PM     CULTURE WILL BE HELD FOR 4 WEEKS. IF THERE IS ADDITIONAL FUNGAL GROWTH, A NEW REPORT WILL FOLLOW. No results found for: TOXA1, RPR, HBCM, HBSAG, HAAB, HCAB, HCAB1, HAAT, G6PD, CRYAC, HIVGT, HIVR, HIV1, HIV12, HIVPC, HIVRPI    Lab Results   Component Value Date/Time    CK 81 02/28/2017 12:55 PM    CK 54 02/17/2017 08:30 AM       Lab Results   Component Value Date/Time    Color YELLOW/STRAW 03/03/2017 12:17 AM    Appearance CLEAR 03/03/2017 12:17 AM    pH (UA) 5.0 03/03/2017 12:17 AM    Protein 30 03/03/2017 12:17 AM    Glucose >1000 03/03/2017 12:17 AM    Ketone NEGATIVE  03/03/2017 12:17 AM    Bilirubin NEGATIVE  03/03/2017 12:17 AM    Blood NEGATIVE  03/03/2017 12:17 AM    Urobilinogen 0.2 03/03/2017 12:17 AM    Nitrites NEGATIVE  03/03/2017 12:17 AM    Leukocyte Esterase TRACE 03/03/2017 12:17 AM    WBC 0-4 03/03/2017 12:17 AM    RBC 0-5 03/03/2017 12:17 AM    Bacteria 1+ 03/03/2017 12:17 AM       Impression  · Chronic hypoxic respiratory failure: ILD stable  · History of RLE DVT; may also have PE, but allergic to contrast and had CKD  · Smoking related ILD on open lung biopsy at [facility] in 2010. No obstructive dz on PFTs. · JASEN  · Chest pain: improved  · Diastolic CHF  · Pulmonary HTN  · HTN  · Acute/chronic kidney disease stage 4: worsening  · History of tobacco use; quit 11/2014     PLAN  · Wean to RA while sedentary and see how pt does with ambulation on RA and what her actual O2 demand is. · Continue scheduled nebs: switch to Albuterol as she is already receiving her home Stiolto  · Continue prednisone and taper  · Do not believe she is actively infected: can taper to doxycycline  · Start Tessalon and Flonase. · Consult Renal concerning increased creatinine: will restart home diuretic and stop IVF. Recheck pro-BNP.   Strict I&O  · No pericardical effusion on ECHO  · Will likely need right sided heart cath as outpt.  Pt to follow up with Dr. Mackenzie Proctor  · Pharmacy to dose Coumadin  · CPAP nightly with 2L bled in  · GI prophylaxis: Protonix  · Will see as needed over the weekend    Lynette Landers MD

## 2017-03-03 NOTE — PROGRESS NOTES
Bedside and Verbal shift change report given to Reema Montiel (oncoming nurse) by Jasmin Nogueira (offgoing nurse). Report included the following information SBAR, Kardex, MAR and Cardiac Rhythm NSR.     0710 - Patient in bed resting. Complains of left shoulder pain. \"It's not my heart. \" \"I don't want any pain medicine now. \" \"I don't like taking pain medicine in the morning. I usually take it at night. \" Patient educated on pain control. 0800 - Bladder scan performed. Amount is 0 ml.     1300 - Patient complaining of left sided stabbing chest pain. \"I get angina from time to time. \" Will medicate according to [facility]     1305 - Patient stated \"No pain now. \"     0 - Dr. Case Lopez notified. EKG ordered. Nitrostat added to [facility] CK-MB and Troponin ordered. 1430 - Patient resting in chair watching TV. No complaints of chest pain. 1545 - Patient compliant of esophageal \"Spasms that come and go since I had my heart attack\" Will monitor. Bedside and Verbal shift change report given to Lauren (oncoming nurse) by Reema Montiel (offgoing nurse). Report included the following information SBAR, Kardex, MAR and Cardiac Rhythm NSR.

## 2017-03-03 NOTE — PROGRESS NOTES
Pt presents with HCAP (healthcare-associated pneumonia)   Days in  LOS at this time is 3    Problem List  Date Reviewed: 3/2/2017          Codes Class Noted    Acute exacerbation of chronic obstructive pulmonary disease (COPD) ([facility] ICD-10-CM: J44.1  ICD-9-CM: 491.21  3/1/2017        * (Principal)HCAP (healthcare-associated pneumonia) ICD-10-CM: J18.9  ICD-9-CM: 486  2/28/2017        CKD (chronic kidney disease) stage 4, GFR 15-29 ml/min (HCC) (Chronic) ICD-10-CM: N18.4  ICD-9-CM: 585.4  2/10/2017        Acute and chronic respiratory failure with hypoxia (HCC) ICD-10-CM: J96.21  ICD-9-CM: 518.84, 799.02  2/10/2017        DVT (deep venous thrombosis) ([facility] ICD-10-CM: I82.409  ICD-9-CM: 453.40  2/2/2017        DM (diabetes mellitus), type 2 with renal complications (HCC) (Chronic) ICD-10-CM: E11.29  ICD-9-CM: 250.40  8/9/2013        Vitamin D deficiency ICD-10-CM: E55.9  ICD-9-CM: 268.9  8/9/2013        Angina, class III ([facility] ICD-10-CM: I20.9  ICD-9-CM: 413.9  8/9/2013        Normocytic anemia (Chronic) ICD-10-CM: D64.9  ICD-9-CM: 285.9  5/26/2013        DJD (degenerative joint disease) of knee ICD-10-CM: M17.9  ICD-9-CM: 715.36  10/30/2012        Chronic diastolic heart failure (HCC) (Chronic) ICD-10-CM: I50.32  ICD-9-CM: 428.32  10/5/2012        HTN (hypertension) (Chronic) ICD-10-CM: I10  ICD-9-CM: 401.9  10/1/2012        Morbid obesity (Chronic) ICD-10-CM: E66.01  ICD-9-CM: 278.01  10/1/2012        Esophageal reflux ICD-10-CM: K21.9  ICD-9-CM: 530.81  10/1/2012        Gastroparesis ICD-10-CM: K31.84  ICD-9-CM: 536.3  10/1/2012        Pulmonary HTN (HCC) (Chronic) ICD-10-CM: I27.2  ICD-9-CM: 416.8  3/21/2012        Interstitial lung disease ([address] (Chronic) ICD-10-CM: J84.9  ICD-9-CM: 266  2/28/2011        CAD (coronary Artery Disease) Native Artery (Chronic) ICD-10-CM: I25.10  ICD-9-CM: 414.01  2/16/2011    Overview Addendum 10/5/2012  7:33 AM by PRASHANT Carranza 2004  1v CAD by cath - PCI OM with SAL  Cath 5/2004 - patent stent, no new disease  Lexiscan cardiolite 12/09- normal perfusion, EF 66%  Cath: 3/19/12: PA 55/30 mean 41, wedge 26, RA 24, EDP 35, LVG 55%, LM normal, LAD normal, LCX - OM1 patent stent, OM2 prox 85% long, % with L to R collaterals                JASEN on CPAP (Chronic) ICD-10-CM: G47.33  ICD-9-CM: 327.23  2/16/2011    Overview Signed 3/21/2012  8:04 AM by PRASHANT Song Dr. Ohs CPAP                   Principal Problem:    HCAP (healthcare-associated pneumonia) (2/28/2017)    Active Problems:    CAD (coronary Artery Disease) Native Artery (2/16/2011)      Overview: [place] 2004      1v CAD by cath - PCI OM with SAL      Cath 5/2004 - patent stent, no new disease      Lexiscan cardiolite 12/09- normal perfusion, EF 66%      Cath: 3/19/12: PA 55/30 mean 41, wedge 26, RA 24, EDP 35, LVG 55%, LM       normal, LAD normal, LCX - OM1 patent stent, OM2 prox 85% long, %       with L to R collaterals             Interstitial lung disease ([address] (2/28/2011)      Pulmonary HTN ([address] (3/21/2012)      HTN (hypertension) (10/1/2012)      Morbid obesity (10/1/2012)      Chronic diastolic heart failure ([address] (10/5/2012)      DJD (degenerative joint disease) of knee (10/30/2012)      DM (diabetes mellitus), type 2 with renal complications ([address] (9/4/0975)      DVT (deep venous thrombosis) ([address] (2/2/2017)      CKD (chronic kidney disease) stage 4, GFR 15-29 ml/min ([facility]) (2/10/2017)      Acute and chronic respiratory failure with hypoxia ([facility]) (2/10/2017)      Acute exacerbation of chronic obstructive pulmonary disease (COPD) ([address] (3/1/2017)        Pt cardiac rhythm at this time is  NSR.       Last documented Troponin   Recent Labs      03/03/17   1420   TROIQ  <0.04       Pt last three weights    Last 3 Recorded Weights in this Encounter    03/01/17 1438 03/02/17 0506 03/03/17 0437   Weight: 147.7 kg (325 lb 11.2 oz) 148.4 kg (327 lb 3.2 oz) 150.5 kg (331 lb 12.7 oz)    last weighed via    gained, 2 lbs. .     Pt has the following consults Consult Pulmonary/Intensivist for ICU management    Pt is currently taking   Current Facility-Administered Medications   Medication Dose Route Frequency    albuterol (PROVENTIL VENTOLIN) nebulizer solution 2.5 mg  2.5 mg Nebulization Q4H RT    guaiFENesin SR (MUCINEX) tablet 600 mg  600 mg Oral Q12H    insulin lispro protamine/insulin lispro (HUMALOG MIX 75/25) injection 30 Units  30 Units SubCUTAneous TIDPC    warfarin (COUMADIN) tablet 4 mg  4 mg Oral ONCE    [START ON 3/4/2017] warfarin (COUMADIN) tablet 2 mg  2 mg Oral QPM    nitroglycerin (NITROSTAT) tablet 0.4 mg  0.4 mg SubLINGual PRN    linaclotide (LINZESS) capsule 290 mcg  290 mcg Oral ACB    [START ON 3/4/2017] tiotropium-olodaterol 2.5-2.5 mcg/actuation mist 2 Puff  2 Puff Inhalation ACL    acetaminophen (TYLENOL) tablet 650 mg  650 mg Oral Q6H    benzonatate (TESSALON) capsule 100 mg  100 mg Oral TID PRN    fluticasone (FLONASE) 50 mcg/actuation nasal spray 2 Spray  2 Spray Both Nostrils DAILY    doxycycline (VIBRA-TABS) tablet 100 mg  100 mg Oral Q12H    insulin NPH (NOVOLIN N, HUMULIN N) injection 15 Units  15 Units SubCUTAneous Q12H    ondansetron (ZOFRAN) injection 4 mg  4 mg IntraVENous Q8H PRN    predniSONE (DELTASONE) tablet 40 mg  40 mg Oral DAILY WITH BREAKFAST    isosorbide mononitrate ER (IMDUR) tablet 120 mg  120 mg Oral DAILY    oxyCODONE-acetaminophen (PERCOCET) 5-325 mg per tablet 1 Tab  1 Tab Oral Q8H PRN    polyethylene glycol (MIRALAX) packet 17 g  17 g Oral DAILY PRN    sodium chloride (NS) flush 5-10 mL  5-10 mL IntraVENous Q8H    sodium chloride (NS) flush 5-10 mL  5-10 mL IntraVENous PRN    sodium chloride (NS) flush 5-10 mL  5-10 mL IntraVENous Q8H    sodium chloride (NS) flush 5-10 mL  5-10 mL IntraVENous PRN    insulin lispro (HUMALOG) injection   SubCUTAneous AC&HS    glucose chewable tablet 16 g  4 Tab Oral PRN    dextrose (D50W) injection syrg 12.5-25 g  12.5-25 g IntraVENous PRN    glucagon (GLUCAGEN) injection 1 mg  1 mg IntraMUSCular PRN    docusate sodium (COLACE) capsule 100 mg  100 mg Oral QHS    hydrALAZINE (APRESOLINE) tablet 25 mg  25 mg Oral TID    sucralfate (CARAFATE) tablet 1 g  1 g Oral AC&HS    carvedilol (COREG) tablet 25 mg  25 mg Oral BID WITH MEALS    pantoprazole (PROTONIX) tablet 40 mg  40 mg Oral ACB    aspirin chewable tablet 81 mg  81 mg Oral DAILY    atorvastatin (LIPITOR) tablet 80 mg  80 mg Oral QPM    Warfarin- Pharmacy Dosing   Other Rx Dosing/Monitoring       Meds held in the past 12 HOURS hours are: Patient declined ordered inhaler, MD and Pharmacy will ok- pt's home inhaler. Due to: begin home inhaler and more aggressive bowel regimen. UPDATE INTAKE AND OUTPUT    Intake/Output Summary (Last 24 hours) at 03/03/17 1652  Last data filed at 03/03/17 0559   Gross per 24 hour   Intake              120 ml   Output              350 ml   Net             -230 ml       12 HOURS CHART SIGN OFF DONE BY Kayla Majano RN        Patient VERBALexpected daily goal for today is: 1. Patient wanted to ensure she would get her home inhaler back. 2.Patient has questions about going home and being able to stay home and not return to the hospital.       Nurse goal for patient today:  Continue monitoring her respiratory and cardiac status.

## 2017-03-03 NOTE — PROGRESS NOTES
Medical Progress Note      NAME: Gayle Simons   :  1955  MRM:  [id number]    Date/Time: 3/3/2017  1:44 PM       Assessment / Plan:     Hx of HTN, DM, DVT, CAD s/p CABG, CKD 4, COPD, ILD, presented with dyspnea, COPD, ILD.  Complicated by worsening renal failure. Patient wanted to transfer care to Hospitalist service on      Acute on chronic respiratory failure/hypoxia: likely due to COPD and underlying mild RB-ILD. No evidence for pneumonia. V/Q scan neg for PE but poor lungs/substrate. Echo neg for pericardial effusion. Needs RHC but not in house. Wean oxygen, assess with ambulation. Continue steroids and nebs. Appreciate pulm input     COPD w/ acute exacerbation: cont above therapy     L sided chest pain: has significant cardiac hx and equates to stable angina. Check ECG and enzymes. SLNTG. Cont pain meds prn. Monitor closely     Chronic diastolic CHF/HTN: monitor volume. Cont coreg, imdur, hydralazine. Holding bumex due to renal failure.     ARF/CKD 4: unclear etiology. Slowly improving. Nephrology following     DM type 2 w/ renal complications: last F9W 2.9%. BS elevated from steroids. DM educator following. Increase home insulin 75/25 to 30 units tid. Monitor BG closely with dynamic renal function but current level of insulin resistance makes hypoglycemia unlikely. Also start NPH bid for steroids and will need to taper off     7) LLE DVT: Repeat VD shows resolution but with risk of PAH, will cont coumadin, monitor INR. Pharm dosing           Subjective:     Chief Complaint:  \"I'm feeling a little better\"    ROS:  (bold if positive, if negative)    CoughSOB/DOEChest Pain  Tolerating PT  Tolerating Diet          Objective:       Vitals:          Last 24hrs VS reviewed since prior progress note.  Most recent are:    Visit Vitals    /70 (BP 1 Location: Right arm, BP Patient Position: Sitting)    Pulse 86    Temp 97.8 °F (36.6 °C)    Resp 18    Ht 5' 10\" (1.778 m)    Wt 150.5 kg (331 lb 12.7 oz)    SpO2 98%    BMI 47.61 kg/m2     SpO2 Readings from Last 6 Encounters:   03/03/17 98%   02/23/17 96%   02/20/17 97%   02/11/17 97%   02/10/17 99%   01/25/17 98%    O2 Flow Rate (L/min): 2 l/min     Intake/Output Summary (Last 24 hours) at 03/03/17 1344  Last data filed at 03/03/17 0559   Gross per 24 hour   Intake              120 ml   Output              350 ml   Net             -230 ml          Exam:     Physical Exam:    Gen:  Well-developed, well-nourished, in no acute distress  HEENT:  Pink conjunctivae, PERRL, hearing intact to voice, moist mucous membranes  Neck:  Supple, without masses, thyroid non-tender  Resp:  No accessory muscle use, fine bilateral crackles with minimal wheezing  Card:  No murmurs, normal S1, S2 without thrills, bruits. +1-2 bilateral edema  Abd:  Soft, non-tender, non-distended, normoactive bowel sounds are present  Musc:  No cyanosis or clubbing  Skin:  No rashes, skin turgor is good  Neuro:   Face symmetric, tongue midline, speech fluent,  strength is 5/5 bilaterally and dorsi / plantarflexion is 5/5 bilaterally, follows commands appropriately  Psych:  Good insight, oriented to person, place and time, alert       Telemetry reviewed:   normal sinus rhythm    Medications Reviewed: (see below)    Lab Data Reviewed: (see below)    ______________________________________________________________________    Medications:     Current Facility-Administered Medications   Medication Dose Route Frequency    albuterol (PROVENTIL VENTOLIN) nebulizer solution 2.5 mg  2.5 mg Nebulization Q4H RT    guaiFENesin SR (MUCINEX) tablet 600 mg  600 mg Oral Q12H    insulin lispro protamine/insulin lispro (HUMALOG MIX 75/25) injection 30 Units  30 Units SubCUTAneous TIDPC    warfarin (COUMADIN) tablet 4 mg  4 mg Oral ONCE    [START ON 3/4/2017] warfarin (COUMADIN) tablet 2 mg  2 mg Oral QPM    nitroglycerin (NITROSTAT) tablet 0.4 mg  0.4 mg SubLINGual PRN    linaclotide (LINZESS) capsule 290 mcg  290 mcg Oral ACB    [START ON 3/4/2017] tiotropium-olodaterol 2.5-2.5 mcg/actuation mist 2 Puff  2 Puff Inhalation ACL    lactulose (CHRONULAC) solution 20 g  20 g Oral ONCE    acetaminophen (TYLENOL) tablet 650 mg  650 mg Oral Q6H    benzonatate (TESSALON) capsule 100 mg  100 mg Oral TID PRN    fluticasone (FLONASE) 50 mcg/actuation nasal spray 2 Spray  2 Spray Both Nostrils DAILY    doxycycline (VIBRA-TABS) tablet 100 mg  100 mg Oral Q12H    insulin NPH (NOVOLIN N, HUMULIN N) injection 15 Units  15 Units SubCUTAneous Q12H    ondansetron (ZOFRAN) injection 4 mg  4 mg IntraVENous Q8H PRN    predniSONE (DELTASONE) tablet 40 mg  40 mg Oral DAILY WITH BREAKFAST    isosorbide mononitrate ER (IMDUR) tablet 120 mg  120 mg Oral DAILY    oxyCODONE-acetaminophen (PERCOCET) 5-325 mg per tablet 1 Tab  1 Tab Oral Q8H PRN    polyethylene glycol (MIRALAX) packet 17 g  17 g Oral DAILY PRN    sodium chloride (NS) flush 5-10 mL  5-10 mL IntraVENous Q8H    sodium chloride (NS) flush 5-10 mL  5-10 mL IntraVENous PRN    sodium chloride (NS) flush 5-10 mL  5-10 mL IntraVENous Q8H    sodium chloride (NS) flush 5-10 mL  5-10 mL IntraVENous PRN    insulin lispro (HUMALOG) injection   SubCUTAneous AC&HS    glucose chewable tablet 16 g  4 Tab Oral PRN    dextrose (D50W) injection syrg 12.5-25 g  12.5-25 g IntraVENous PRN    glucagon (GLUCAGEN) injection 1 mg  1 mg IntraMUSCular PRN    docusate sodium (COLACE) capsule 100 mg  100 mg Oral QHS    hydrALAZINE (APRESOLINE) tablet 25 mg  25 mg Oral TID    sucralfate (CARAFATE) tablet 1 g  1 g Oral AC&HS    carvedilol (COREG) tablet 25 mg  25 mg Oral BID WITH MEALS    pantoprazole (PROTONIX) tablet 40 mg  40 mg Oral ACB    aspirin chewable tablet 81 mg  81 mg Oral DAILY    atorvastatin (LIPITOR) tablet 80 mg  80 mg Oral QPM    Warfarin- Pharmacy Dosing   Other Rx Dosing/Monitoring            Lab Review:     Recent Labs      03/03/17   6288  03/02/17 5333  03/01/17   0431   WBC  10.9  6.1  6.8   HGB  8.8*  9.5*  9.4*   HCT  27.2*  28.9*  29.0*   PLT  319  327  282     Recent Labs      03/03/17   0413  03/02/17   0442  03/01/17   0431   NA  136  136  140   K  5.0  4.9  4.7   CL  101  100  105   CO2  24  23  28   GLU  338*  425*  172*   BUN  54*  47*  40*   CREA  3.99*  4.24*  3.92*   CA  7.9*  8.1*  8.3*   MG  1.1*   --    --    PHOS  3.1   --    --    ALB  2.8*  2.9*  3.0*   TBILI  0.3  0.4  0.6   SGOT  7*  7*  22   ALT  17  20  19   INR  2.2*  2.3*  2.4*     Lab Results   Component Value Date/Time    Glucose (POC) 229 03/03/2017 11:37 AM    Glucose (POC) 299 03/03/2017 07:55 AM    Glucose (POC) 504 03/02/2017 09:01 PM    Glucose (POC) 390 03/02/2017 04:17 PM    Glucose (POC) 339 03/02/2017 11:14 AM         Total time spent with patient: Art Lawrence discussed with: Patient, Family, Care Manager, Nursing Staff, Consultant/Specialist and >50% of time spent in counseling and coordination of care    Discussed:  Care Plan and D/C Planning    Prophylaxis:  Coumadin    Disposition:  Home w/Family           ___________________________________________________    Attending Physician: Ahmet Marshall DO

## 2017-03-03 NOTE — PROGRESS NOTES
[facility] Dosing Services: 3/3/17    Consult for Warfarin Dosing by Pharmacy by Dr. Jose Krueger provided for this 63 y.o.  female , for indication of Venous Thrombosis. Day of Therapy PTA: 4mg on Mon, Fri, 2mg rest of the week  Dose to achieve an INR goal of 2-3    Order entered for  Warfarin  4 mg tonight, 2 mg on Saturday and Sunday pm.  INR changed to Mon-Wed-Fri     Significant drug interactions: None  Previous dose given 2 mg 3/2/17   PT/INR Lab Results   Component Value Date/Time    INR 2.2 03/03/2017 04:13 AM      Platelets Lab Results   Component Value Date/Time    PLATELET 685 32/70/8904 04:13 AM      H/H Lab Results   Component Value Date/Time    HGB 8.8 03/03/2017 04:13 AM        Pharmacy to follow daily and will provide subsequent Warfarin dosing based on clinical status. Thank you,    Keshia Parisi.  Pollo Carrillo

## 2017-03-04 LAB
ANA SER QL: NEGATIVE
ANION GAP BLD CALC-SCNC: 10 MMOL/L (ref 5–15)
BASOPHILS # BLD AUTO: 0 K/UL (ref 0–0.1)
BASOPHILS # BLD: 0 % (ref 0–1)
BUN SERPL-MCNC: 60 MG/DL (ref 6–20)
BUN/CREAT SERPL: 17 (ref 12–20)
C3 SERPL-MCNC: 181 MG/DL (ref 82–167)
C4 SERPL-MCNC: 50 MG/DL (ref 14–44)
CALCIUM SERPL-MCNC: 8.1 MG/DL (ref 8.5–10.1)
CALCIUM SERPL-MCNC: 8.1 MG/DL (ref 8.5–10.1)
CHLORIDE SERPL-SCNC: 104 MMOL/L (ref 97–108)
CO2 SERPL-SCNC: 24 MMOL/L (ref 21–32)
CREAT SERPL-MCNC: 3.63 MG/DL (ref 0.55–1.02)
CREAT UR-MCNC: 145.29 MG/DL
EOSINOPHIL # BLD: 0 K/UL (ref 0–0.4)
EOSINOPHIL NFR BLD: 0 % (ref 0–7)
ERYTHROCYTE [DISTWIDTH] IN BLOOD BY AUTOMATED COUNT: 14.5 % (ref 11.5–14.5)
FERRITIN SERPL-MCNC: 339 NG/ML (ref 8–252)
GLUCOSE BLD STRIP.AUTO-MCNC: 158 MG/DL (ref 65–100)
GLUCOSE BLD STRIP.AUTO-MCNC: 166 MG/DL (ref 65–100)
GLUCOSE BLD STRIP.AUTO-MCNC: 184 MG/DL (ref 65–100)
GLUCOSE BLD STRIP.AUTO-MCNC: 263 MG/DL (ref 65–100)
GLUCOSE BLD STRIP.AUTO-MCNC: 325 MG/DL (ref 65–100)
GLUCOSE SERPL-MCNC: 187 MG/DL (ref 65–100)
HCT VFR BLD AUTO: 27.7 % (ref 35–47)
HGB BLD-MCNC: 9 G/DL (ref 11.5–16)
IRON SATN MFR SERPL: 31 % (ref 20–50)
IRON SERPL-MCNC: 72 UG/DL (ref 35–150)
LYMPHOCYTES # BLD AUTO: 24 % (ref 12–49)
LYMPHOCYTES # BLD: 2.9 K/UL (ref 0.8–3.5)
MAGNESIUM SERPL-MCNC: 1.1 MG/DL (ref 1.6–2.4)
MCH RBC QN AUTO: 30 PG (ref 26–34)
MCHC RBC AUTO-ENTMCNC: 32.5 G/DL (ref 30–36.5)
MCV RBC AUTO: 92.3 FL (ref 80–99)
MONOCYTES # BLD: 1.1 K/UL (ref 0–1)
MONOCYTES NFR BLD AUTO: 9 % (ref 5–13)
NEUTS SEG # BLD: 8 K/UL (ref 1.8–8)
NEUTS SEG NFR BLD AUTO: 67 % (ref 32–75)
PHOSPHATE SERPL-MCNC: 3.3 MG/DL (ref 2.6–4.7)
PLATELET # BLD AUTO: 333 K/UL (ref 150–400)
POTASSIUM SERPL-SCNC: 4.5 MMOL/L (ref 3.5–5.1)
PROT UR-MCNC: 65 MG/DL (ref 0–11.9)
PTH-INTACT SERPL-MCNC: 404.3 PG/ML (ref 14–72)
RBC # BLD AUTO: 3 M/UL (ref 3.8–5.2)
SERVICE CMNT-IMP: ABNORMAL
SODIUM SERPL-SCNC: 138 MMOL/L (ref 136–145)
TIBC SERPL-MCNC: 230 UG/DL (ref 250–450)
WBC # BLD AUTO: 12.1 K/UL (ref 3.6–11)

## 2017-03-04 PROCEDURE — A9270 NON-COVERED ITEM OR SERVICE: HCPCS | Performed by: INTERNAL MEDICINE

## 2017-03-04 PROCEDURE — 74011636637 HC RX REV CODE- 636/637: Performed by: INTERNAL MEDICINE

## 2017-03-04 PROCEDURE — 83735 ASSAY OF MAGNESIUM: CPT | Performed by: INTERNAL MEDICINE

## 2017-03-04 PROCEDURE — 85025 COMPLETE CBC W/AUTO DIFF WBC: CPT | Performed by: INTERNAL MEDICINE

## 2017-03-04 PROCEDURE — 77030027138 HC INCENT SPIROMETER -A

## 2017-03-04 PROCEDURE — 74011250636 HC RX REV CODE- 250/636: Performed by: INTERNAL MEDICINE

## 2017-03-04 PROCEDURE — 80048 BASIC METABOLIC PNL TOTAL CA: CPT | Performed by: INTERNAL MEDICINE

## 2017-03-04 PROCEDURE — 36415 COLL VENOUS BLD VENIPUNCTURE: CPT | Performed by: INTERNAL MEDICINE

## 2017-03-04 PROCEDURE — 74011250637 HC RX REV CODE- 250/637: Performed by: FAMILY MEDICINE

## 2017-03-04 PROCEDURE — 74011000250 HC RX REV CODE- 250: Performed by: NURSE PRACTITIONER

## 2017-03-04 PROCEDURE — 82570 ASSAY OF URINE CREATININE: CPT | Performed by: INTERNAL MEDICINE

## 2017-03-04 PROCEDURE — 74011250637 HC RX REV CODE- 250/637: Performed by: STUDENT IN AN ORGANIZED HEALTH CARE EDUCATION/TRAINING PROGRAM

## 2017-03-04 PROCEDURE — 84156 ASSAY OF PROTEIN URINE: CPT | Performed by: INTERNAL MEDICINE

## 2017-03-04 PROCEDURE — 74011636637 HC RX REV CODE- 636/637: Performed by: STUDENT IN AN ORGANIZED HEALTH CARE EDUCATION/TRAINING PROGRAM

## 2017-03-04 PROCEDURE — 83540 ASSAY OF IRON: CPT | Performed by: INTERNAL MEDICINE

## 2017-03-04 PROCEDURE — 77010033678 HC OXYGEN DAILY

## 2017-03-04 PROCEDURE — 82728 ASSAY OF FERRITIN: CPT | Performed by: INTERNAL MEDICINE

## 2017-03-04 PROCEDURE — 83970 ASSAY OF PARATHORMONE: CPT | Performed by: INTERNAL MEDICINE

## 2017-03-04 PROCEDURE — 82962 GLUCOSE BLOOD TEST: CPT

## 2017-03-04 PROCEDURE — 74011250637 HC RX REV CODE- 250/637: Performed by: INTERNAL MEDICINE

## 2017-03-04 PROCEDURE — 94660 CPAP INITIATION&MGMT: CPT

## 2017-03-04 PROCEDURE — 74011636637 HC RX REV CODE- 636/637: Performed by: FAMILY MEDICINE

## 2017-03-04 PROCEDURE — 84100 ASSAY OF PHOSPHORUS: CPT | Performed by: INTERNAL MEDICINE

## 2017-03-04 PROCEDURE — 51798 US URINE CAPACITY MEASURE: CPT

## 2017-03-04 PROCEDURE — 65270000029 HC RM PRIVATE

## 2017-03-04 PROCEDURE — 94640 AIRWAY INHALATION TREATMENT: CPT

## 2017-03-04 RX ADMIN — WARFARIN SODIUM 2 MG: 2 TABLET ORAL at 17:24

## 2017-03-04 RX ADMIN — SUCRALFATE 1 G: 1 TABLET ORAL at 21:08

## 2017-03-04 RX ADMIN — OXYCODONE HYDROCHLORIDE AND ACETAMINOPHEN 1 TABLET: 5; 325 TABLET ORAL at 21:09

## 2017-03-04 RX ADMIN — INSULIN LISPRO 30 UNITS: 100 INJECTION, SUSPENSION SUBCUTANEOUS at 17:25

## 2017-03-04 RX ADMIN — LACTULOSE 30 G: 10 SOLUTION ORAL at 14:03

## 2017-03-04 RX ADMIN — SUCRALFATE 1 G: 1 TABLET ORAL at 12:37

## 2017-03-04 RX ADMIN — GUAIFENESIN 600 MG: 600 TABLET, EXTENDED RELEASE ORAL at 21:09

## 2017-03-04 RX ADMIN — FLUTICASONE PROPIONATE 2 SPRAY: 50 SPRAY, METERED NASAL at 08:51

## 2017-03-04 RX ADMIN — ISOSORBIDE MONONITRATE 120 MG: 30 TABLET ORAL at 08:52

## 2017-03-04 RX ADMIN — ONDANSETRON 4 MG: 2 INJECTION INTRAMUSCULAR; INTRAVENOUS at 21:09

## 2017-03-04 RX ADMIN — INSULIN LISPRO 4 UNITS: 100 INJECTION, SOLUTION INTRAVENOUS; SUBCUTANEOUS at 22:56

## 2017-03-04 RX ADMIN — LINACLOTIDE 290 MCG: 145 CAPSULE, GELATIN COATED ORAL at 08:51

## 2017-03-04 RX ADMIN — ALBUTEROL SULFATE 2.5 MG: 2.5 SOLUTION RESPIRATORY (INHALATION) at 15:10

## 2017-03-04 RX ADMIN — INSULIN LISPRO 30 UNITS: 100 INJECTION, SUSPENSION SUBCUTANEOUS at 08:55

## 2017-03-04 RX ADMIN — INSULIN LISPRO 30 UNITS: 100 INJECTION, SUSPENSION SUBCUTANEOUS at 12:40

## 2017-03-04 RX ADMIN — INSULIN LISPRO 3 UNITS: 100 INJECTION, SOLUTION INTRAVENOUS; SUBCUTANEOUS at 17:24

## 2017-03-04 RX ADMIN — INSULIN LISPRO 3 UNITS: 100 INJECTION, SOLUTION INTRAVENOUS; SUBCUTANEOUS at 12:38

## 2017-03-04 RX ADMIN — ASPIRIN 81 MG CHEWABLE TABLET 81 MG: 81 TABLET CHEWABLE at 08:52

## 2017-03-04 RX ADMIN — HUMAN INSULIN 15 UNITS: 100 INJECTION, SUSPENSION SUBCUTANEOUS at 08:55

## 2017-03-04 RX ADMIN — INSULIN LISPRO 3 UNITS: 100 INJECTION, SOLUTION INTRAVENOUS; SUBCUTANEOUS at 08:54

## 2017-03-04 RX ADMIN — Medication 10 ML: at 06:21

## 2017-03-04 RX ADMIN — PANTOPRAZOLE SODIUM 40 MG: 40 TABLET, DELAYED RELEASE ORAL at 08:52

## 2017-03-04 RX ADMIN — HUMAN INSULIN 15 UNITS: 100 INJECTION, SUSPENSION SUBCUTANEOUS at 21:00

## 2017-03-04 RX ADMIN — CARVEDILOL 25 MG: 12.5 TABLET, FILM COATED ORAL at 08:52

## 2017-03-04 RX ADMIN — DOCUSATE SODIUM 100 MG: 100 CAPSULE ORAL at 21:09

## 2017-03-04 RX ADMIN — SUCRALFATE 1 G: 1 TABLET ORAL at 08:52

## 2017-03-04 RX ADMIN — CARVEDILOL 25 MG: 12.5 TABLET, FILM COATED ORAL at 17:23

## 2017-03-04 RX ADMIN — Medication 10 ML: at 14:04

## 2017-03-04 RX ADMIN — ALBUTEROL SULFATE 2.5 MG: 2.5 SOLUTION RESPIRATORY (INHALATION) at 11:05

## 2017-03-04 RX ADMIN — GUAIFENESIN 600 MG: 600 TABLET, EXTENDED RELEASE ORAL at 08:51

## 2017-03-04 RX ADMIN — ALBUTEROL SULFATE 2.5 MG: 2.5 SOLUTION RESPIRATORY (INHALATION) at 23:18

## 2017-03-04 RX ADMIN — ALBUTEROL SULFATE 2.5 MG: 2.5 SOLUTION RESPIRATORY (INHALATION) at 00:48

## 2017-03-04 RX ADMIN — HYDRALAZINE HYDROCHLORIDE 25 MG: 25 TABLET, FILM COATED ORAL at 17:24

## 2017-03-04 RX ADMIN — ALBUTEROL SULFATE 2.5 MG: 2.5 SOLUTION RESPIRATORY (INHALATION) at 19:16

## 2017-03-04 RX ADMIN — SUCRALFATE 1 G: 1 TABLET ORAL at 17:24

## 2017-03-04 RX ADMIN — PREDNISONE 40 MG: 20 TABLET ORAL at 08:51

## 2017-03-04 RX ADMIN — ALBUTEROL SULFATE 2.5 MG: 2.5 SOLUTION RESPIRATORY (INHALATION) at 04:50

## 2017-03-04 RX ADMIN — Medication 10 ML: at 21:09

## 2017-03-04 RX ADMIN — DOXYCYCLINE HYCLATE 100 MG: 100 TABLET, COATED ORAL at 21:09

## 2017-03-04 RX ADMIN — HYDRALAZINE HYDROCHLORIDE 25 MG: 25 TABLET, FILM COATED ORAL at 08:51

## 2017-03-04 RX ADMIN — Medication 10 ML: at 21:10

## 2017-03-04 RX ADMIN — DOXYCYCLINE HYCLATE 100 MG: 100 TABLET, COATED ORAL at 06:20

## 2017-03-04 RX ADMIN — ATORVASTATIN CALCIUM 80 MG: 20 TABLET, FILM COATED ORAL at 17:24

## 2017-03-04 RX ADMIN — HYDRALAZINE HYDROCHLORIDE 25 MG: 25 TABLET, FILM COATED ORAL at 21:08

## 2017-03-04 NOTE — PROGRESS NOTES
Bedside and Verbal shift change report given to Gaston Calles (oncoming nurse) by Lauren (offgoing nurse). Report included the following information SBAR, Kardex and MAR.    0710 - Patient resting in bed. Continues to complains of pain to left chest. Refused pain medication. Will monitor. 0830 - Patient in chair watching TV. Will monitor. 1400 - Patient states unable to void. Bladder scan performed shows 172 ml. Dr. Fletcher King notified. Verbal orders to recheck status of voiding around shift change and if still not voiding perform another bladder scan and call with results. Will follow up with night nurse Sridhar Sheikh. Bedside and Verbal shift change report given to Sridhar Sheikh (oncoming nurse) by Gaston Calles (offgoing nurse). Report included the following information SBAR, Kardex, MAR and Cardiac Rhythm NSR.

## 2017-03-04 NOTE — PROGRESS NOTES
19:20 Received bedside report of 64 y/o female Pt. From Marysol Tellez. Pt. Was awake sitting on the bed. On 1.5 LPM O2.   20:30 Assessment done as documented. 21:10 Blood sugar 374, Dr. Danii Mukherjee notified. Order obtained to give 12 units sliding scale. 21:20 Due med given. 23:30 Pt. Was sleeping. On CPAP machine. 00:15 Assessment done as documented. Pt. Refused po tylenol. 02:00 Sleeping. 04:45 Assessment done. Assisted to bathroom, urine sample sent. 05:00 Blood specimen sent. 06:30 Due med given. 07:35 Report given to [facility]. 07:55 CBC and CMP sent.

## 2017-03-04 NOTE — CARDIO/PULMONARY
Cardiac rehab:  3/4/2017 @ 1310:  Received cardiac rehab consult for h/o CHF education. Met with 62 y/o female admitted patient admitted 3/2/17 with SOB and chest pain. Dx: HCAP per Dr. Chemo Brock H&P. Pt is familar to CR RN from recent admission 2/17/17 with SOB. Reintroduced educational role of Cardiac Rehab RN.  arrived in room during session. Pt reported she's been told most of her issues are respiratory in nature not cardiac. However she did reported Dr. Job Delgado discuss poss need for RHC. Asked why. Discussed relationship with COPD/lungs with right heart pressures and function. She reported better understanding of purpose if RHC needed however explained to pt respiratory issues at this time negate RHC as she cannot lie flat. Encourage to discuss with Dr. Yuri Pearson (her cardiologist). She reported wt gain of 10 lbs since admission. She also reported she has CHF packet from previous admission and does not need another. She has been diligent with weighing daily and \"really reading labels for sodium content. \"  Attached avoiding triggers for HF and COPD to AVS.  3/8/2017 @ 1200:  Met with pt sitting at bedside on 5th floor, M/S unit. Chart review showed pt has had 25 lb weight gain since admission. Dr. Yolanda Medina has added metolazone to her regime. Pt reported she is \"feeling better\" respiratory wise but concerned about weight gain. Core Measures (if not met include reason why)  ACE/ARB -  None (creat>2)  BB - carvedilol   Statin - atorvastatin  ASA - 81 mg  Prior to admission cardiac meds include: Warfarin, NTG (sl), hydralazine, Bumex, Imdur  Diet education: Currently on diabetic consistent/carb diet. A1C 6.9 (3/3/17). Fluid intake education:   Pt with CKD4 with admission creat 4.24 now down to 3.63. Pt reported she has been approached about HD and has had family h/o renal failure/HD. She suspects to eventually be placed on HD but not ready yet.     Daily Weights: Pt has scale and weighing daily.  She reported normal weight for her is 320 lbs. Smoking cessation: Former smoker, quit 2014, on O2 at home 2L/NC  Medication Education: Provided copy of med rec hx. Reviewed meds. Discussed holding Bumex today due to elevated creat. New PO meds this admission:  No new PO cardiac meds at this time. 3/8/2017:  Metolazone. Discussed purpose and side effects. Attached info on new meds to AVS.   Concern for knowledge deficit: Pt/ verbalized understanding of information provided. All question answered. 3/8/2017:  Pt/ verbalized understanding of information provided. All question answered. 1. During this hospital stay did staff take your preferences and those of your family (or caregiver) into account in deciding your individual heart failure needs, and in deciding when you left the hospital?   2. Do you have a good understanding of the things you are responsible for in managing heart failure? 3/4/2017:  YES   3. Do you clearly understand the purpose for taking each medication?  3/4/2017:  YES

## 2017-03-04 NOTE — PROGRESS NOTES
Medical Progress Note      NAME: Leonardo Murray   :  1955  MRM:  [id number]    Date/Time: 3/4/2017  1:44 PM       Assessment / Plan:     Hx of HTN, DM, DVT, CAD s/p CABG, CKD 4, COPD, ILD, presented with dyspnea, COPD, ILD.  Complicated by worsening renal failure. Patient wanted to transfer care to Hospitalist service on      Acute on chronic respiratory failure/hypoxia: likely due to COPD and underlying mild RB-ILD. No evidence for pneumonia. V/Q scan neg for PE but poor lungs/substrate. Echo neg for pericardial effusion. Needs RHC at some point, will ask cardiology to see. Dyspnea out of proportion to lung disease, wonder if diuresis may be necessary soon despite renal dysfunction. Wean oxygen, assess with ambulation. Taper steroids and nebs. Appreciate pulm input     COPD w/ acute exacerbation: cont above therapy. Taper steroids    Obstructive sleep apnea. CPAP qhs     L sided chest pain: has significant cardiac hx and equates to stable angina but may be MSK. ECG and CE negative. Cont pain meds prn. Monitor closely     Chronic diastolic CHF/HTN: monitor volume. Cont coreg, imdur, hydralazine. Holding bumex due to renal failure but worry with 10# wt gain that we may be seeing an acute exacerbation that may potentiation hypoxemia     ARF/CKD 4: unclear etiology. Slowly improving. Nephrology following     DM type 2 w/ renal complications: last A1H 3.1%. BS elevated from steroids. DM educator following. Increased home insulin 75/25 to 30 units tid. Monitor BG closely with dynamic renal function but current level of insulin resistance makes hypoglycemia unlikely. Also start NPH bid for steroids and will need to taper off as well.     LLE DVT: Repeat VD shows resolution but with risk of PAH and ongoing lung parenchyma dysfunction, will cont coumadin, monitor INR.  Pharm dosing           Subjective:     Chief Complaint:  \"I'm feeling a little better but I still get so winded when I walk\"    ROS:  (bold if positive, if negative)    CoughSOB/DOEChest Pain  Tolerating PT  Tolerating Diet          Objective:       Vitals:          Last 24hrs VS reviewed since prior progress note. Most recent are:    Visit Vitals    /70 (BP 1 Location: Right arm, BP Patient Position: At rest)    Pulse 78    Temp 98.3 °F (36.8 °C)    Resp 22    Ht 5' 10\" (1.778 m)    Wt 152.1 kg (335 lb 6.4 oz)    SpO2 96%    BMI 48.12 kg/m2     SpO2 Readings from Last 6 Encounters:   03/04/17 96%   02/23/17 96%   02/20/17 97%   02/11/17 97%   02/10/17 99%   01/25/17 98%    O2 Flow Rate (L/min): 1.5 l/min       Intake/Output Summary (Last 24 hours) at 03/04/17 1612  Last data filed at 03/04/17 0443   Gross per 24 hour   Intake              100 ml   Output              400 ml   Net             -300 ml          Exam:     Physical Exam:    Gen:  Well-developed, well-nourished, in no acute distress  HEENT:  Pink conjunctivae, PERRL, hearing intact to voice, moist mucous membranes  Neck:  Supple, without masses, thyroid non-tender  Resp:  No accessory muscle use, fine bilateral crackles with minimal wheezing  Card:  No murmurs, normal S1, S2 without thrills, bruits. +1-2 bilateral edema  Abd:  Soft, non-tender, non-distended, normoactive bowel sounds are present  Musc:  No cyanosis or clubbing  Skin:  No rashes, skin turgor is good  Neuro:   Face symmetric, tongue midline, speech fluent,  strength is 5/5 bilaterally and dorsi / plantarflexion is 5/5 bilaterally, follows commands appropriately  Psych:  Good insight, oriented to person, place and time, alert       Telemetry reviewed:   normal sinus rhythm    Medications Reviewed: (see below)    Lab Data Reviewed: (see below)    ______________________________________________________________________    Medications:     Current Facility-Administered Medications   Medication Dose Route Frequency    albuterol (PROVENTIL VENTOLIN) nebulizer solution 2.5 mg  2.5 mg Nebulization Q4H RT    insulin lispro protamine/insulin lispro (HUMALOG MIX 75/25) injection 30 Units  30 Units SubCUTAneous TIDPC    warfarin (COUMADIN) tablet 2 mg  2 mg Oral QPM    nitroglycerin (NITROSTAT) tablet 0.4 mg  0.4 mg SubLINGual PRN    linaclotide (LINZESS) capsule 290 mcg  290 mcg Oral ACB    tiotropium-olodaterol 2.5-2.5 mcg/actuation mist 2 Puff  2 Puff Inhalation ACL    guaiFENesin ER (MUCINEX) tablet 600 mg  600 mg Oral Q12H    acetaminophen (TYLENOL) tablet 650 mg  650 mg Oral Q6H    benzonatate (TESSALON) capsule 100 mg  100 mg Oral TID PRN    fluticasone (FLONASE) 50 mcg/actuation nasal spray 2 Spray  2 Spray Both Nostrils DAILY    doxycycline (VIBRA-TABS) tablet 100 mg  100 mg Oral Q12H    insulin NPH (NOVOLIN N, HUMULIN N) injection 15 Units  15 Units SubCUTAneous Q12H    ondansetron (ZOFRAN) injection 4 mg  4 mg IntraVENous Q8H PRN    predniSONE (DELTASONE) tablet 40 mg  40 mg Oral DAILY WITH BREAKFAST    isosorbide mononitrate ER (IMDUR) tablet 120 mg  120 mg Oral DAILY    oxyCODONE-acetaminophen (PERCOCET) 5-325 mg per tablet 1 Tab  1 Tab Oral Q8H PRN    polyethylene glycol (MIRALAX) packet 17 g  17 g Oral DAILY PRN    sodium chloride (NS) flush 5-10 mL  5-10 mL IntraVENous Q8H    sodium chloride (NS) flush 5-10 mL  5-10 mL IntraVENous PRN    sodium chloride (NS) flush 5-10 mL  5-10 mL IntraVENous Q8H    sodium chloride (NS) flush 5-10 mL  5-10 mL IntraVENous PRN    insulin lispro (HUMALOG) injection   SubCUTAneous AC&HS    glucose chewable tablet 16 g  4 Tab Oral PRN    dextrose (D50W) injection syrg 12.5-25 g  12.5-25 g IntraVENous PRN    glucagon (GLUCAGEN) injection 1 mg  1 mg IntraMUSCular PRN    docusate sodium (COLACE) capsule 100 mg  100 mg Oral QHS    hydrALAZINE (APRESOLINE) tablet 25 mg  25 mg Oral TID    sucralfate (CARAFATE) tablet 1 g  1 g Oral AC&HS    carvedilol (COREG) tablet 25 mg  25 mg Oral BID WITH MEALS    pantoprazole (PROTONIX) tablet 40 mg  40 mg Oral ACB    aspirin chewable tablet 81 mg  81 mg Oral DAILY    atorvastatin (LIPITOR) tablet 80 mg  80 mg Oral QPM    Warfarin- Pharmacy Dosing   Other Rx Dosing/Monitoring            Lab Review:     Recent Labs      03/04/17   0750  03/03/17 0413 03/02/17 0442   WBC  12.1*  10.9  6.1   HGB  9.0*  8.8*  9.5*   HCT  27.7*  27.2*  28.9*   PLT  333  319  327     Recent Labs      03/04/17   0750  03/04/17   0502  03/03/17 0413 03/02/17 0442   NA  138   --   136  136   K  4.5   --   5.0  4.9   CL  104   --   101  100   CO2  24   --   24  23   GLU  187*   --   338*  425*   BUN  60*   --   54*  47*   CREA  3.63*   --   3.99*  4.24*   CA  8.1*  8.1*  7.9*  8.1*   MG  1.1*   --   1.1*   --    PHOS  3.3   --   3.1   --    ALB   --    --   2.8*  2.9*   TBILI   --    --   0.3  0.4   SGOT   --    --   7*  7*   ALT   --    --   17  20   INR   --    --   2.2*  2.3*     Lab Results   Component Value Date/Time    Glucose (POC) 166 03/04/2017 11:11 AM    Glucose (POC) 184 03/04/2017 07:12 AM    Glucose (POC) 374 03/03/2017 08:30 PM    Glucose (POC) 301 03/03/2017 04:37 PM    Glucose (POC) 229 03/03/2017 11:37 AM         Total time spent with patient: [address] discussed with: Patient, Nursing Staff and >50% of time spent in counseling and coordination of care    Discussed:  Care Plan and D/C Planning    Prophylaxis:  Coumadin    Disposition:  Home w/Family           ___________________________________________________    Attending Physician: Katya Chamberlain DO

## 2017-03-04 NOTE — PROGRESS NOTES
[address] [facility]  YOB: 1955          Assessment & Plan:   1. ROSEANN on CKD 4  - CR 3.3 2 20 17  - h/o recurrent ROSEANN s with loops: pre renal  - continue to hold diuretics for now; await labs  - poor taste in mouth :? CPAP related , less likely uremia  2. ILD  - Serologies pending but had biopsy proven Fibrosis due to smoking  3. HTN  - Coreg,Hydralazine  4. DM  - Insulin  5. Res failure  - due to Obesity. ILD,Some volume  - sob might be also due to anemia  6.  Anemia  - get Iron panel       Subjective:   CC:ckd  HPI: Patient seen   Stable night, not SOB, U?O OK but not well documented; labs ordered  ROS:cp better  No v/d  Current Facility-Administered Medications   Medication Dose Route Frequency    albuterol (PROVENTIL VENTOLIN) nebulizer solution 2.5 mg  2.5 mg Nebulization Q4H RT    insulin lispro protamine/insulin lispro (HUMALOG MIX 75/25) injection 30 Units  30 Units SubCUTAneous TIDPC    warfarin (COUMADIN) tablet 2 mg  2 mg Oral QPM    nitroglycerin (NITROSTAT) tablet 0.4 mg  0.4 mg SubLINGual PRN    linaclotide (LINZESS) capsule 290 mcg  290 mcg Oral ACB    tiotropium-olodaterol 2.5-2.5 mcg/actuation mist 2 Puff  2 Puff Inhalation ACL    guaiFENesin ER (MUCINEX) tablet 600 mg  600 mg Oral Q12H    acetaminophen (TYLENOL) tablet 650 mg  650 mg Oral Q6H    benzonatate (TESSALON) capsule 100 mg  100 mg Oral TID PRN    fluticasone (FLONASE) 50 mcg/actuation nasal spray 2 Spray  2 Spray Both Nostrils DAILY    doxycycline (VIBRA-TABS) tablet 100 mg  100 mg Oral Q12H    insulin NPH (NOVOLIN N, HUMULIN N) injection 15 Units  15 Units SubCUTAneous Q12H    ondansetron (ZOFRAN) injection 4 mg  4 mg IntraVENous Q8H PRN    predniSONE (DELTASONE) tablet 40 mg  40 mg Oral DAILY WITH BREAKFAST    isosorbide mononitrate ER (IMDUR) tablet 120 mg  120 mg Oral DAILY    oxyCODONE-acetaminophen (PERCOCET) 5-325 mg per tablet 1 Tab  1 Tab Oral Q8H PRN    polyethylene glycol (MIRALAX) packet 17 g  17 g Oral DAILY PRN    sodium chloride (NS) flush 5-10 mL  5-10 mL IntraVENous Q8H    sodium chloride (NS) flush 5-10 mL  5-10 mL IntraVENous PRN    sodium chloride (NS) flush 5-10 mL  5-10 mL IntraVENous Q8H    sodium chloride (NS) flush 5-10 mL  5-10 mL IntraVENous PRN    insulin lispro (HUMALOG) injection   SubCUTAneous AC&HS    glucose chewable tablet 16 g  4 Tab Oral PRN    dextrose (D50W) injection syrg 12.5-25 g  12.5-25 g IntraVENous PRN    glucagon (GLUCAGEN) injection 1 mg  1 mg IntraMUSCular PRN    docusate sodium (COLACE) capsule 100 mg  100 mg Oral QHS    hydrALAZINE (APRESOLINE) tablet 25 mg  25 mg Oral TID    sucralfate (CARAFATE) tablet 1 g  1 g Oral AC&HS    carvedilol (COREG) tablet 25 mg  25 mg Oral BID WITH MEALS    pantoprazole (PROTONIX) tablet 40 mg  40 mg Oral ACB    aspirin chewable tablet 81 mg  81 mg Oral DAILY    atorvastatin (LIPITOR) tablet 80 mg  80 mg Oral QPM    Warfarin- Pharmacy Dosing   Other Rx Dosing/Monitoring          Objective:     Vitals:  Blood pressure 136/69, pulse 80, temperature 98.4 °F (36.9 °C), resp. rate 16, height 5' 10\" (1.778 m), weight 152.1 kg (335 lb 6.4 oz), SpO2 95 %. Temp (24hrs), Av.2 °F (36.8 °C), Min:97.8 °F (36.6 °C), Max:98.6 °F (37 °C)      Intake and Output:      0701 -  1900  In: 850 [P.O.:850]  Out: 1250 [Urine:1250]    Physical Exam:                Patient is intubated:  no    Physical Examination:   GENERAL ASSESSMENT: NAD  HEENT:Nontraumatic   CHEST: mostly CTA today  HEART: S1S2  ABDOMEN: Soft,NT,  :Myers: n  EXTREMITY: 1+ EDEMA  NEURO:Grossly non focal          ECG/rhythm:    Data Review      No results for input(s): TNIPOC in the last 72 hours.     No lab exists for component: ITNL   Recent Labs      17   1420  17   1033   CPK  43   --    CKMB  <1.0   --    TROIQ  <0.04  <0.04     Recent Labs      17   0413  17   0442   NA 136  136   K  5.0  4.9   CL  101  100   CO2  24  23   BUN  54*  47*   CREA  3.99*  4.24*   GLU  338*  425*   PHOS  3.1   --    MG  1.1*   --    CA  7.9*  8.1*   ALB  2.8*  2.9*   WBC  10.9  6.1   HGB  8.8*  9.5*   HCT  27.2*  28.9*   PLT  319  327      Recent Labs      03/03/17   0413  03/02/17   0442   INR  2.2*  2.3*   PTP  22.6*  23.5*     Needs: urine analysis, urine sodium, protein and creatinine  Lab Results   Component Value Date/Time    Sodium urine, random 25 11/10/2014 12:40 PM    Creatinine, urine 115.07 02/04/2017 09:33 AM         Discussed with:  patient    : Jack Vogel MD  3/4/2017        [facility]:  [email or web address]  [place] office:  [address] [place] [address], [address] [place] [address],[address]  [place] [id number] [facility]  Phone: [phone]  Fax :     [phone]    [place] office:  [address] [facility], [facility]  Phone - [phone]  Fax - [phone]

## 2017-03-05 LAB
ANION GAP BLD CALC-SCNC: 10 MMOL/L (ref 5–15)
BASOPHILS # BLD AUTO: 0 K/UL (ref 0–0.1)
BASOPHILS # BLD: 0 % (ref 0–1)
BUN SERPL-MCNC: 64 MG/DL (ref 6–20)
BUN/CREAT SERPL: 18 (ref 12–20)
CALCIUM SERPL-MCNC: 7.9 MG/DL (ref 8.5–10.1)
CHLORIDE SERPL-SCNC: 105 MMOL/L (ref 97–108)
CO2 SERPL-SCNC: 23 MMOL/L (ref 21–32)
CREAT SERPL-MCNC: 3.59 MG/DL (ref 0.55–1.02)
DIFFERENTIAL METHOD BLD: ABNORMAL
EOSINOPHIL # BLD: 0 K/UL (ref 0–0.4)
EOSINOPHIL NFR BLD: 0 % (ref 0–7)
ERYTHROCYTE [DISTWIDTH] IN BLOOD BY AUTOMATED COUNT: 15.4 % (ref 11.5–14.5)
GLUCOSE BLD STRIP.AUTO-MCNC: 152 MG/DL (ref 65–100)
GLUCOSE BLD STRIP.AUTO-MCNC: 212 MG/DL (ref 65–100)
GLUCOSE BLD STRIP.AUTO-MCNC: 261 MG/DL (ref 65–100)
GLUCOSE BLD STRIP.AUTO-MCNC: 344 MG/DL (ref 65–100)
GLUCOSE SERPL-MCNC: 187 MG/DL (ref 65–100)
HCT VFR BLD AUTO: 30.2 % (ref 35–47)
HGB BLD-MCNC: 8.1 G/DL (ref 11.5–16)
LYMPHOCYTES # BLD AUTO: 17 % (ref 12–49)
LYMPHOCYTES # BLD: 2.2 K/UL (ref 0.8–3.5)
MAGNESIUM SERPL-MCNC: 1.1 MG/DL (ref 1.6–2.4)
MCH RBC QN AUTO: 27.1 PG (ref 26–34)
MCHC RBC AUTO-ENTMCNC: 26.8 G/DL (ref 30–36.5)
MCV RBC AUTO: 101 FL (ref 80–99)
MONOCYTES # BLD: 1 K/UL (ref 0–1)
MONOCYTES NFR BLD AUTO: 8 % (ref 5–13)
NEUTS BAND NFR BLD MANUAL: 1 % (ref 0–6)
NEUTS SEG # BLD: 9.5 K/UL (ref 1.8–8)
NEUTS SEG NFR BLD AUTO: 74 % (ref 32–75)
PHOSPHATE SERPL-MCNC: 3.2 MG/DL (ref 2.6–4.7)
PLATELET # BLD AUTO: 295 K/UL (ref 150–400)
POTASSIUM SERPL-SCNC: 4.5 MMOL/L (ref 3.5–5.1)
RBC # BLD AUTO: 2.99 M/UL (ref 3.8–5.2)
RBC MORPH BLD: ABNORMAL
RBC MORPH BLD: ABNORMAL
SERVICE CMNT-IMP: ABNORMAL
SODIUM SERPL-SCNC: 138 MMOL/L (ref 136–145)
WBC # BLD AUTO: 12.7 K/UL (ref 3.6–11)

## 2017-03-05 PROCEDURE — 74011250637 HC RX REV CODE- 250/637: Performed by: FAMILY MEDICINE

## 2017-03-05 PROCEDURE — 74011250636 HC RX REV CODE- 250/636: Performed by: INTERNAL MEDICINE

## 2017-03-05 PROCEDURE — 94660 CPAP INITIATION&MGMT: CPT

## 2017-03-05 PROCEDURE — 74011000250 HC RX REV CODE- 250: Performed by: NURSE PRACTITIONER

## 2017-03-05 PROCEDURE — 36415 COLL VENOUS BLD VENIPUNCTURE: CPT | Performed by: INTERNAL MEDICINE

## 2017-03-05 PROCEDURE — 77010033678 HC OXYGEN DAILY

## 2017-03-05 PROCEDURE — 84100 ASSAY OF PHOSPHORUS: CPT | Performed by: INTERNAL MEDICINE

## 2017-03-05 PROCEDURE — 74011636637 HC RX REV CODE- 636/637: Performed by: INTERNAL MEDICINE

## 2017-03-05 PROCEDURE — 74011636637 HC RX REV CODE- 636/637: Performed by: FAMILY MEDICINE

## 2017-03-05 PROCEDURE — 85025 COMPLETE CBC W/AUTO DIFF WBC: CPT | Performed by: INTERNAL MEDICINE

## 2017-03-05 PROCEDURE — 77030012890

## 2017-03-05 PROCEDURE — 94640 AIRWAY INHALATION TREATMENT: CPT

## 2017-03-05 PROCEDURE — 82962 GLUCOSE BLOOD TEST: CPT

## 2017-03-05 PROCEDURE — 74011250637 HC RX REV CODE- 250/637: Performed by: INTERNAL MEDICINE

## 2017-03-05 PROCEDURE — 80048 BASIC METABOLIC PNL TOTAL CA: CPT | Performed by: INTERNAL MEDICINE

## 2017-03-05 PROCEDURE — A9270 NON-COVERED ITEM OR SERVICE: HCPCS | Performed by: INTERNAL MEDICINE

## 2017-03-05 PROCEDURE — 83735 ASSAY OF MAGNESIUM: CPT | Performed by: INTERNAL MEDICINE

## 2017-03-05 PROCEDURE — 65660000000 HC RM CCU STEPDOWN

## 2017-03-05 PROCEDURE — 77030018846 HC SOL IRR STRL H20 ICUM -A

## 2017-03-05 RX ORDER — LANOLIN ALCOHOL/MO/W.PET/CERES
400 CREAM (GRAM) TOPICAL 2 TIMES DAILY
Status: DISCONTINUED | OUTPATIENT
Start: 2017-03-05 | End: 2017-03-10 | Stop reason: HOSPADM

## 2017-03-05 RX ADMIN — HYDRALAZINE HYDROCHLORIDE 25 MG: 25 TABLET, FILM COATED ORAL at 22:41

## 2017-03-05 RX ADMIN — ISOSORBIDE MONONITRATE 120 MG: 30 TABLET ORAL at 08:00

## 2017-03-05 RX ADMIN — ALBUTEROL SULFATE 2.5 MG: 2.5 SOLUTION RESPIRATORY (INHALATION) at 11:27

## 2017-03-05 RX ADMIN — PANTOPRAZOLE SODIUM 40 MG: 40 TABLET, DELAYED RELEASE ORAL at 06:50

## 2017-03-05 RX ADMIN — INSULIN LISPRO 3 UNITS: 100 INJECTION, SOLUTION INTRAVENOUS; SUBCUTANEOUS at 07:59

## 2017-03-05 RX ADMIN — LINACLOTIDE 290 MCG: 145 CAPSULE, GELATIN COATED ORAL at 06:48

## 2017-03-05 RX ADMIN — PREDNISONE 30 MG: 5 TABLET ORAL at 08:01

## 2017-03-05 RX ADMIN — ALBUTEROL SULFATE 2.5 MG: 2.5 SOLUTION RESPIRATORY (INHALATION) at 19:46

## 2017-03-05 RX ADMIN — OXYCODONE HYDROCHLORIDE AND ACETAMINOPHEN 1 TABLET: 5; 325 TABLET ORAL at 18:20

## 2017-03-05 RX ADMIN — ASPIRIN 81 MG CHEWABLE TABLET 81 MG: 81 TABLET CHEWABLE at 08:00

## 2017-03-05 RX ADMIN — INSULIN LISPRO 4 UNITS: 100 INJECTION, SOLUTION INTRAVENOUS; SUBCUTANEOUS at 11:52

## 2017-03-05 RX ADMIN — ALBUTEROL SULFATE 2.5 MG: 2.5 SOLUTION RESPIRATORY (INHALATION) at 03:25

## 2017-03-05 RX ADMIN — HUMAN INSULIN 15 UNITS: 100 INJECTION, SUSPENSION SUBCUTANEOUS at 22:41

## 2017-03-05 RX ADMIN — GUAIFENESIN 600 MG: 600 TABLET, EXTENDED RELEASE ORAL at 08:00

## 2017-03-05 RX ADMIN — INSULIN LISPRO 4 UNITS: 100 INJECTION, SOLUTION INTRAVENOUS; SUBCUTANEOUS at 22:41

## 2017-03-05 RX ADMIN — HUMAN INSULIN 15 UNITS: 100 INJECTION, SUSPENSION SUBCUTANEOUS at 08:02

## 2017-03-05 RX ADMIN — Medication 10 ML: at 06:48

## 2017-03-05 RX ADMIN — SUCRALFATE 1 G: 1 TABLET ORAL at 06:48

## 2017-03-05 RX ADMIN — ALBUTEROL SULFATE 2.5 MG: 2.5 SOLUTION RESPIRATORY (INHALATION) at 16:12

## 2017-03-05 RX ADMIN — DOXYCYCLINE HYCLATE 100 MG: 100 TABLET, COATED ORAL at 06:47

## 2017-03-05 RX ADMIN — CARVEDILOL 25 MG: 12.5 TABLET, FILM COATED ORAL at 18:04

## 2017-03-05 RX ADMIN — MAGNESIUM GLUCONATE 500 MG ORAL TABLET 400 MG: 500 TABLET ORAL at 18:04

## 2017-03-05 RX ADMIN — CARVEDILOL 25 MG: 12.5 TABLET, FILM COATED ORAL at 07:59

## 2017-03-05 RX ADMIN — INSULIN LISPRO: 100 INJECTION, SUSPENSION SUBCUTANEOUS at 18:03

## 2017-03-05 RX ADMIN — HYDRALAZINE HYDROCHLORIDE 25 MG: 25 TABLET, FILM COATED ORAL at 18:04

## 2017-03-05 RX ADMIN — ALBUTEROL SULFATE 2.5 MG: 2.5 SOLUTION RESPIRATORY (INHALATION) at 07:15

## 2017-03-05 RX ADMIN — INSULIN LISPRO 30 UNITS: 100 INJECTION, SUSPENSION SUBCUTANEOUS at 08:01

## 2017-03-05 RX ADMIN — DOCUSATE SODIUM 100 MG: 100 CAPSULE ORAL at 20:32

## 2017-03-05 RX ADMIN — SUCRALFATE 1 G: 1 TABLET ORAL at 22:41

## 2017-03-05 RX ADMIN — Medication 10 ML: at 22:42

## 2017-03-05 RX ADMIN — GUAIFENESIN 600 MG: 600 TABLET, EXTENDED RELEASE ORAL at 20:32

## 2017-03-05 RX ADMIN — WARFARIN SODIUM 2 MG: 2 TABLET ORAL at 18:05

## 2017-03-05 RX ADMIN — Medication 10 ML: at 22:41

## 2017-03-05 RX ADMIN — ATORVASTATIN CALCIUM 80 MG: 20 TABLET, FILM COATED ORAL at 18:04

## 2017-03-05 RX ADMIN — ONDANSETRON 4 MG: 2 INJECTION INTRAMUSCULAR; INTRAVENOUS at 18:20

## 2017-03-05 RX ADMIN — FLUTICASONE PROPIONATE 2 SPRAY: 50 SPRAY, METERED NASAL at 08:03

## 2017-03-05 RX ADMIN — HYDRALAZINE HYDROCHLORIDE 25 MG: 25 TABLET, FILM COATED ORAL at 08:00

## 2017-03-05 RX ADMIN — DOXYCYCLINE HYCLATE 100 MG: 100 TABLET, COATED ORAL at 20:32

## 2017-03-05 RX ADMIN — INSULIN LISPRO 10 UNITS: 100 INJECTION, SOLUTION INTRAVENOUS; SUBCUTANEOUS at 18:04

## 2017-03-05 RX ADMIN — SUCRALFATE 1 G: 1 TABLET ORAL at 11:53

## 2017-03-05 RX ADMIN — INSULIN LISPRO 30 UNITS: 100 INJECTION, SUSPENSION SUBCUTANEOUS at 13:30

## 2017-03-05 RX ADMIN — SUCRALFATE 1 G: 1 TABLET ORAL at 18:04

## 2017-03-05 NOTE — PROGRESS NOTES
Bedside and Verbal shift change report given to Jose Carlos Lepe RN (oncoming nurse) by Krystal Phan RN (offgoing nurse). Report included the following information SBAR, Kardex, ED Summary, Intake/Output, Recent Results, Med Rec Status and Cardiac Rhythm NSR. Bedside and Verbal shift change report given to Marianne Da Silva RN (oncoming nurse) by Jose Carlos Lepe RN (offgoing nurse). Report included the following information SBAR, Kardex, Intake/Output, Recent Results, Med Rec Status and Cardiac Rhythm NSR with 1st degree AVB.

## 2017-03-05 NOTE — PROGRESS NOTES
Shift Report:    0710: Verbal bedside report received from Luis Corley RN. Patient is resting quietly in bed with CPAP on. Patient's  at bedside sleeping. 0830: Notified patient that she has transfer orders. She denies any needs at this time. 1140: Notified patient that she has a room assigned on 5th floor. 1150: Dr. Kenneth Scott at bedside to see patient. She is currently eating. Patient refused tylenol and states that she has not been taking this; will notify Dr. Reyes Clause so it can be changed to PRN. 1210: TRANSFER - OUT REPORT:    Verbal report given to Yousuf Vieira RN (name) on Chica Thompson  being transferred to 5th  531 (unit) for routine progression of care       Report consisted of patients Situation, Background, Assessment and   Recommendations(SBAR). Information from the following report(s) SBAR, Kardex, MAR, Accordion and Recent Results was reviewed with the receiving nurse. Lines:   Peripheral IV 03/01/17 Left Antecubital (Active)   Site Assessment Clean, dry, & intact 3/5/2017  4:36 AM   Phlebitis Assessment 0 3/5/2017  4:36 AM   Infiltration Assessment 0 3/5/2017  4:36 AM   Dressing Status Clean, dry, & intact 3/5/2017  4:36 AM   Dressing Type Tape;Transparent 3/5/2017  4:36 AM   Hub Color/Line Status Pink;Flushed;Patent; End cap changed 3/5/2017  4:36 AM   Action Taken Open ports on tubing capped 3/5/2017  4:36 AM   Alcohol Cap Used Yes 3/5/2017  4:36 AM        Opportunity for questions and clarification was provided.       Patient transported with:   Monitor  O2 @ 2 liters

## 2017-03-05 NOTE — CONSULTS
Reason for Consult: Chest pain. HPI: Mars Callahan is a 63 y.o. female with past history of CAD, Stents, HTN, CKD, Diastolic CHF,   Admitted with increasing SOB and pneumonia. She also complained of left sided, dull aching, moderate, left sided anterior chest pain radiating to the left arm associated with the SOB. No fever, chills, presyncope or syncope. ROS: No bdominal pain, nausea, vomiting, diarrhea, lightheadedness, dizziness, presyncope or syncope. No dysuia or constipation.          Past Medical History:   Diagnosis Date     Sleep Apnea 2/16/2011    Angina, class III ([address] 8/9/2013    Aortic aneurysm (HCC)     CAD (coronary Artery Disease) Native Artery 2/16/2011    CKD (chronic kidney disease) stage 4, GFR 15-29 ml/min ([address] 2/10/2017    Diabetic gastroparesis (HCC)     Diastolic heart failure ([address] 10/5/2012    Esophageal stricture 2012    dialted Dr. Nathaly Aquino G6PD deficiency ([address]     trait    GERD (gastroesophageal reflux disease)     Hypertensive Cardiovasc Dis Benign, No CHF 2/16/2011    ILD (interstitial lung disease) ([address]     open lung bx CJW 2010    OA (osteoarthritis)     Obesity 2/16/2011    Ovarian cancer ([address]     cervical and uterine    Rheumatoid arteritis ([address]     T2DM (type 2 diabetes mellitus) ([address] 8/9/2013    Tobacco use disorder 3/21/2012    Uterine cervix cancer ([address]     Vitamin D deficiency 8/9/2013            Past Surgical History:   Procedure Laterality Date    CARDIAC SURG PROCEDURE UNLIST      stents    COLONOSCOPY N/A 6/24/2016    COLONOSCOPY performed by Alma Rosa Thompson MD at [facility] HX APPENDECTOMY      HX CARPAL TUNNEL RELEASE      bilateral    HX CHOLECYSTECTOMY      HX HERNIA REPAIR      HX HYSTERECTOMY      HX ORTHOPAEDIC  11/12/12    right knee replacement    HX TONSIL AND ADENOIDECTOMY      UPPER GI ENDOSCOPY,VINOD W GUIDE  6/24/2016                  Family History   Problem Relation Age of Onset    Heart Disease Mother     Heart Disease Brother            Social History     Social History    Marital status:      Spouse name: N/A    Number of children: N/A    Years of education: N/A     Occupational History    Not on file. Social History Main Topics    Smoking status: Former Smoker     Packs/day: 0.50     Years: 41.00     Types: Cigarettes     Quit date: 11/9/2014    Smokeless tobacco: Never Used    Alcohol use No    Drug use: No    Sexual activity: Not on file     Other Topics Concern    Not on file     Social History Narrative         Allergies   Allergen Reactions    Contrast Dye [Iodine] Anaphylaxis    Levaquin [Levofloxacin] Nausea and Vomiting    Morphine Hives and Itching            Current Facility-Administered Medications   Medication Dose Route Frequency Provider Last Rate Last Dose    magnesium oxide (MAG-OX) tablet 400 mg  400 mg Oral BID aLkesha Berg MD        predniSONE (DELTASONE) tablet 30 mg  30 mg Oral DAILY WITH BREAKFAST Teo Mcneill V, DO   30 mg at 03/05/17 0801    albuterol (PROVENTIL VENTOLIN) nebulizer solution 2.5 mg  2.5 mg Nebulization Q4H RT Lucina Yao NP   2.5 mg at 03/05/17 1127    insulin lispro protamine/insulin lispro (HUMALOG MIX 75/25) injection 30 Units  30 Units SubCUTAneous TIDPC Teo Mcneill V, DO   30 Units at 03/05/17 0801    warfarin (COUMADIN) tablet 2 mg  2 mg Oral QPM Teo Mcneill V, DO   2 mg at 03/04/17 1724    nitroglycerin (NITROSTAT) tablet 0.4 mg  0.4 mg SubLINGual PRN Teo Mcneill V, DO        linaclotide [facility]) capsule 290 mcg  290 mcg Oral ACB Teo Mcneill V, DO   290 mcg at 03/05/17 3384    tiotropium-olodaterol 2.5-2.5 mcg/actuation mist 2 Puff  2 Puff Inhalation ACL Sandra Grumman V, DO   2 Puff at 03/05/17 1100    guaiFENesin ER [facility]) tablet 600 mg  600 mg Oral Q12H Teo Mcneill V, DO   600 mg at 03/05/17 0800    acetaminophen (TYLENOL) tablet 650 mg  650 mg Oral Q6H Elena Link MD  benzonatate (TESSALON) capsule 100 mg  100 mg Oral TID PRN Suzy Long McFarling, NP        fluticasone (FLONASE) 50 mcg/actuation nasal spray 2 Spray  2 Spray Both Nostrils DAILY Suzy Long McFarling, NP   2 Crary at 03/05/17 0803    doxycycline (VIBRA-TABS) tablet 100 mg  100 mg Oral Q12H Massiel Johnston MD   100 mg at 03/05/17 0647    insulin NPH (NOVOLIN N, HUMULIN N) injection 15 Units  15 Units SubCUTAneous Q12H Abad SRINIVASAN Do, MD   15 Units at 03/05/17 0802    [facility]ron [facility]) injection 4 mg  4 mg IntraVENous Q8H PRN Abad SRINIVASAN Do, MD   4 mg at 03/04/17 2109    isosorbide mononitrate ER (IMDUR) tablet 120 mg  120 mg Oral DAILY Rosa Guerrero MD   120 mg at 03/05/17 0800    oxyCODONE-acetaminophen (PERCOCET) 5-325 mg per tablet 1 Tab  1 Tab Oral Q8H PRN Rosa Guerrero MD   1 Tab at 03/04/17 2109    polyethylene glycol (MIRALAX) packet 17 g  17 g Oral DAILY PRN Rosa Guerrero MD   17 g at 03/01/17 2230    sodium chloride (NS) flush 5-10 mL  5-10 mL IntraVENous Q8H Pilar Bedoya MD   10 mL at 03/04/17 2110    sodium chloride (NS) flush 5-10 mL  5-10 mL IntraVENous PRN Pilar Bedoya MD        sodium chloride (NS) flush 5-10 mL  5-10 mL IntraVENous Q8H Mickey Espinoza MD   10 mL at 03/05/17 0648    sodium chloride (NS) flush 5-10 mL  5-10 mL IntraVENous PRN Mickey Espinoza MD   10 mL at 03/03/17 0557    insulin lispro (HUMALOG) injection   SubCUTAneous AC&HS Mickey Espinoza MD   4 Units at 03/05/17 1152    glucose chewable tablet 16 g  4 Tab Oral PRN Mickey Espinoza MD        dextrose (D50W) injection syrg 12.5-25 g  12.5-25 g IntraVENous CYN Espinoza MD        glucagon (GLUCAGEN) injection 1 mg  1 mg IntraMUSCular PRN Mickey Espinoza MD        docusate sodium (COLACE) capsule 100 mg  100 mg Oral QHS Kristelmeliton Burt MD   100 mg at 03/04/17 2109    hydrALAZINE (APRESOLINE) tablet 25 mg  25 mg Oral TID Mickey Espinoza MD   25 mg at 03/05/17 0800    sucralfate (CARAFATE) tablet 1 g  1 g Oral AC&HS Chi Perez MD   1 g at 03/05/17 1153    carvedilol (COREG) tablet 25 mg  25 mg Oral BID WITH MEALS Kristel Burt MD   25 mg at 03/05/17 0759    pantoprazole (PROTONIX) tablet 40 mg  40 mg Oral ACB Kristel Burt MD   40 mg at 03/05/17 0650    aspirin chewable tablet 81 mg  81 mg Oral DAILY Kristel Burt MD   81 mg at 03/05/17 0800    atorvastatin (LIPITOR) tablet 80 mg  80 mg Oral QPM Chi Perez MD   80 mg at 03/04/17 1724    Warfarin- Pharmacy Dosing   Other Rx Dosing/Monitoring Chi Perez MD            ROS:  12 point review of systems was performed. All negative except for HPI     Physical Exam:  Visit Vitals    /65    Pulse 90    Temp 98.5 °F (36.9 °C)    Resp 20    Ht 5' 10\" (1.778 m)    Wt 337 lb 4.9 oz (153 kg)    SpO2 97%    BMI 48.4 kg/m2       Gen:  Well-developed, well-nourished, in no acute distress  HEENT:  Pink conjunctivae, PERRL, hearing intact to voice, moist mucous membranes  Neck:  Supple, without masses, thyroid non-tender  Resp:  No accessory muscle use, clear breath sounds without wheezes rales or rhonchi  Card:  No murmurs, normal S1, S2 without thrills, bruits. Abd:  Soft, non-tender, non-distended, normoactive bowel sounds are present, no palpable organomegaly and no detectable hernias  Lymph:  No cervical or inguinal adenopathy  Musc:  No cyanosis or clubbing  Skin:  No rashes or ulcers, Mild pedal edema.    Neuro:  Cranial nerves are grossly intact, no focal motor weakness, follows commands appropriately  Psych:  Good insight, oriented to person, place and time, alert     Labs:     Lab Results  Component Value Date/Time   WBC 12.7 03/05/2017 05:11 AM   Hemoglobin (POC) 9.9 07/21/2013 09:51 PM   HGB 8.1 03/05/2017 05:11 AM   Hematocrit (POC) 29 07/21/2013 09:51 PM   HCT 30.2 03/05/2017 05:11 AM   PLATELET 429 41/58/0516 05:11 AM   .0 03/05/2017 05:11 AM       Lab Results  Component Value Date/Time   Hemoglobin A1c 6.9 03/03/2017 04:13 AM   Hemoglobin A1c 6.6 02/03/2017 03:30 AM   Hemoglobin A1c 5.2 11/09/2014 04:00 AM   Glucose 187 03/05/2017 05:11 AM   Glucose (POC) 212 03/05/2017 11:13 AM   LDL, calculated 92.6 11/09/2014 03:30 AM   Creatinine (POC) 2.1 07/21/2013 09:51 PM   Creatinine 3.59 03/05/2017 05:11 AM      Lab Results  Component Value Date/Time   Cholesterol, total 176 11/09/2014 03:30 AM   HDL Cholesterol 64 11/09/2014 03:30 AM   LDL, calculated 92.6 11/09/2014 03:30 AM   Triglyceride 97 11/09/2014 03:30 AM   CHOL/HDL Ratio 2.8 11/09/2014 03:30 AM       Lab Results  Component Value Date/Time   ALT (SGPT) 17 03/03/2017 04:13 AM   AST (SGOT) 7 03/03/2017 04:13 AM   Alk.  phosphatase 67 03/03/2017 04:13 AM   Bilirubin, direct 0.1 03/03/2017 04:13 AM   Bilirubin, total 0.3 03/03/2017 04:13 AM       Lab Results   Component Value Date/Time    INR 2.2 03/03/2017 04:13 AM    INR 2.3 03/02/2017 04:42 AM    INR 2.4 03/01/2017 04:31 AM    Prothrombin time 22.6 03/03/2017 04:13 AM    Prothrombin time 23.5 03/02/2017 04:42 AM    Prothrombin time 24.7 03/01/2017 04:31 AM      Lab Results  Component Value Date/Time   GFR est AA 16 03/05/2017 05:11 AM   GFR est non-AA 13 03/05/2017 05:11 AM   Creatinine (POC) 2.1 07/21/2013 09:51 PM   Creatinine 3.59 03/05/2017 05:11 AM   BUN 64 03/05/2017 05:11 AM   BUN (POC) 30 07/21/2013 09:51 PM   Sodium (POC) 140 07/21/2013 09:51 PM   Sodium 138 03/05/2017 05:11 AM   Potassium 4.5 03/05/2017 05:11 AM   Potassium (POC) 3.9 07/21/2013 09:51 PM   Chloride (POC) 109 07/21/2013 09:51 PM   Chloride 105 03/05/2017 05:11 AM   CO2 23 03/05/2017 05:11 AM      No results found for: PSA, PSA2, PSAR1, PSA1, PSAR2, PSA3, PSAR3, [id number], [id number], PSALT  No results found for: TSH, TSH2, TSH3, TSHP, TSHELE, TSHEXT, TT3, T3U, T3UP, FRT3, FT3, FT4, FT4P, T4, T4P, FT4T, TT7, TSHEXT   Lab Results   Component Value Date/Time    Glucose 187 03/05/2017 05:11 AM    Glucose (POC) 212 03/05/2017 11:13 AM      Lab Results   Component Value Date/Time    CK 43 03/03/2017 02:20 PM    CK - MB <1.0 03/03/2017 02:20 PM    CK-MB Index Cannot be calulated 03/03/2017 02:20 PM    Troponin-I, Qt. <0.04 03/03/2017 02:20 PM      Lab Results   Component Value Date/Time    NT pro- 03/02/2017 04:42 AM    NT pro- 02/28/2017 12:55 PM    NT pro- 02/16/2017 01:42 PM    NT pro-BNP 72 07/01/2015 07:42 PM    NT pro- 11/09/2014 12:01 AM      Lab Results   Component Value Date/Time    Sodium 138 03/05/2017 05:11 AM    Potassium 4.5 03/05/2017 05:11 AM    Chloride 105 03/05/2017 05:11 AM    CO2 23 03/05/2017 05:11 AM    Anion gap 10 03/05/2017 05:11 AM    Glucose 187 03/05/2017 05:11 AM    BUN 64 03/05/2017 05:11 AM    Creatinine 3.59 03/05/2017 05:11 AM    BUN/Creatinine ratio 18 03/05/2017 05:11 AM    GFR est AA 16 03/05/2017 05:11 AM    GFR est non-AA 13 03/05/2017 05:11 AM    Calcium 7.9 03/05/2017 05:11 AM      Lab Results   Component Value Date/Time    Sodium 138 03/05/2017 05:11 AM    Potassium 4.5 03/05/2017 05:11 AM    Chloride 105 03/05/2017 05:11 AM    CO2 23 03/05/2017 05:11 AM    Anion gap 10 03/05/2017 05:11 AM    Glucose 187 03/05/2017 05:11 AM    BUN 64 03/05/2017 05:11 AM    Creatinine 3.59 03/05/2017 05:11 AM    BUN/Creatinine ratio 18 03/05/2017 05:11 AM    GFR est AA 16 03/05/2017 05:11 AM    GFR est non-AA 13 03/05/2017 05:11 AM    Calcium 7.9 03/05/2017 05:11 AM    Bilirubin, total 0.3 03/03/2017 04:13 AM    ALT (SGPT) 17 03/03/2017 04:13 AM    AST (SGOT) 7 03/03/2017 04:13 AM    Alk.  phosphatase 67 03/03/2017 04:13 AM    Protein, total 6.4 03/03/2017 04:13 AM    Albumin 2.8 03/03/2017 04:13 AM    Globulin 3.6 03/03/2017 04:13 AM    A-G Ratio 0.8 03/03/2017 04:13 AM      Lab Results   Component Value Date/Time    Hemoglobin A1c 6.9 03/03/2017 04:13 AM           Recent Labs      03/03/17   1420   CPK  43   CKMB  <1.0   TROIQ  <0.04     Diagnostic Tests:   EKG: normal sinus rhythm, normal ST segment, PVC, normal axis, normal intervals.         Problem List:     Problem List  Date Reviewed: 3/2/2017          Codes Class Noted    Acute exacerbation of chronic obstructive pulmonary disease (COPD) ([facility] ICD-10-CM: J44.1  ICD-9-CM: 491.21  3/1/2017        * (Principal)HCAP (healthcare-associated pneumonia) ICD-10-CM: J18.9  ICD-9-CM: 486  2/28/2017        CKD (chronic kidney disease) stage 4, GFR 15-29 ml/min (HCC) (Chronic) ICD-10-CM: N18.4  ICD-9-CM: 585.4  2/10/2017        Acute and chronic respiratory failure with hypoxia ([facility] ICD-10-CM: J96.21  ICD-9-CM: 518.84, 799.02  2/10/2017        DVT (deep venous thrombosis) ([facility] ICD-10-CM: I82.409  ICD-9-CM: 453.40  2/2/2017        DM (diabetes mellitus), type 2 with renal complications (HCC) (Chronic) ICD-10-CM: E11.29  ICD-9-CM: 250.40  8/9/2013        Vitamin D deficiency ICD-10-CM: E55.9  ICD-9-CM: 268.9  8/9/2013        Angina, class III ([facility] ICD-10-CM: I20.9  ICD-9-CM: 413.9  8/9/2013        Normocytic anemia (Chronic) ICD-10-CM: D64.9  ICD-9-CM: 285.9  5/26/2013        DJD (degenerative joint disease) of knee ICD-10-CM: M17.9  ICD-9-CM: 715.36  10/30/2012        Chronic diastolic heart failure (HCC) (Chronic) ICD-10-CM: I50.32  ICD-9-CM: 428.32  10/5/2012        HTN (hypertension) (Chronic) ICD-10-CM: I10  ICD-9-CM: 401.9  10/1/2012        Morbid obesity (Chronic) ICD-10-CM: E66.01  ICD-9-CM: 278.01  10/1/2012        Esophageal reflux ICD-10-CM: K21.9  ICD-9-CM: 530.81  10/1/2012        Gastroparesis ICD-10-CM: K31.84  ICD-9-CM: 536.3  10/1/2012        Pulmonary HTN ([facility] (Chronic) ICD-10-CM: I27.2  ICD-9-CM: 416.8  3/21/2012        Interstitial lung disease ([facility] (Chronic) ICD-10-CM: J84.9  ICD-9-CM: 866  2/28/2011        CAD (coronary Artery Disease) Native Artery (Chronic) ICD-10-CM: I25.10  ICD-9-CM: 414.01  2/16/2011    Overview Addendum 10/5/2012  7:33 AM by PRASHANT Hampton 2004  1v CAD by cath - PCI OM with SAL  Cath 5/2004 - patent stent, no new disease  Lexiscan cardiolite 12/09- normal perfusion, EF 66%  Cath: 3/19/12: PA 55/30 mean 41, wedge 26, RA 24, EDP 35, LVG 55%, LM normal, LAD normal, LCX - OM1 patent stent, OM2 prox 85% long, % with L to R collaterals                JASEN on CPAP (Chronic) ICD-10-CM: U12.39  ICD-9-CM: 327.23  2/16/2011    Overview Signed 3/21/2012  8:04 AM by JUSTICE LeungP     Dr. Jayde Karimi CPAP                   Plan:    1. Chest pain/ CAD: Trop is negative. EKG is not recent. Will reorder. CT chest show heavy coronary calcium and has known history of CAD with stents. Ideally should get a cardiac cath however Cr is high. Will kristine to wait until renal function settles down. Continue symtomatic management. Continue ASA, Coreg and Statins. 2. Continue Coumadin for now for DVT. Yuan Bowens MD, [facility]

## 2017-03-05 NOTE — PROGRESS NOTES
RNA F/U Note      CC:  ROSEANN/CKD4      -  Mg remains low at 1.1  - Cr is better  - has constipation  - no sob/cp, no f/c/ns, no abd pain     Meds: Reviewed       ROS: see above     PE:  Noted from EHR (VS)  Gen: NAD  HEENT:anicteric   Neck: supple   Chest: CTAB  Heart: rrr  Abdomen: soft, NTND active bs  Extrem: no edema  Neuro:axox3  Psych:full affect      Labs:  Reviewed     Impression:  ROSEANN   CKD 4  HypoMg     Plan:  Oral Mg repletion which may help her constipation as well

## 2017-03-06 LAB
ANION GAP BLD CALC-SCNC: 10 MMOL/L (ref 5–15)
BACTERIA SPEC CULT: NORMAL
BASOPHILS # BLD AUTO: 0.1 K/UL (ref 0–0.1)
BASOPHILS # BLD: 1 % (ref 0–1)
BUN SERPL-MCNC: 63 MG/DL (ref 6–20)
BUN/CREAT SERPL: 19 (ref 12–20)
CALCIUM SERPL-MCNC: 8.1 MG/DL (ref 8.5–10.1)
CHLORIDE SERPL-SCNC: 107 MMOL/L (ref 97–108)
CO2 SERPL-SCNC: 25 MMOL/L (ref 21–32)
CREAT SERPL-MCNC: 3.39 MG/DL (ref 0.55–1.02)
DIFFERENTIAL METHOD BLD: ABNORMAL
EOSINOPHIL # BLD: 0 K/UL (ref 0–0.4)
EOSINOPHIL NFR BLD: 0 % (ref 0–7)
ERYTHROCYTE [DISTWIDTH] IN BLOOD BY AUTOMATED COUNT: 14.7 % (ref 11.5–14.5)
GBM IGG SER-ACNC: 17 UNITS (ref 0–20)
GLUCOSE BLD STRIP.AUTO-MCNC: 104 MG/DL (ref 65–100)
GLUCOSE BLD STRIP.AUTO-MCNC: 162 MG/DL (ref 65–100)
GLUCOSE BLD STRIP.AUTO-MCNC: 199 MG/DL (ref 65–100)
GLUCOSE BLD STRIP.AUTO-MCNC: 232 MG/DL (ref 65–100)
GLUCOSE SERPL-MCNC: 111 MG/DL (ref 65–100)
HCT VFR BLD AUTO: 27.7 % (ref 35–47)
HGB BLD-MCNC: 8.8 G/DL (ref 11.5–16)
INR PPP: 3.3 (ref 0.9–1.1)
LYMPHOCYTES # BLD AUTO: 28 % (ref 12–49)
LYMPHOCYTES # BLD: 4.2 K/UL (ref 0.8–3.5)
MAGNESIUM SERPL-MCNC: 1.3 MG/DL (ref 1.6–2.4)
MCH RBC QN AUTO: 29.4 PG (ref 26–34)
MCHC RBC AUTO-ENTMCNC: 31.8 G/DL (ref 30–36.5)
MCV RBC AUTO: 92.6 FL (ref 80–99)
MONOCYTES # BLD: 0.7 K/UL (ref 0–1)
MONOCYTES NFR BLD AUTO: 5 % (ref 5–13)
MYELOCYTES NFR BLD MANUAL: 4 %
NEUTS BAND NFR BLD MANUAL: 1 % (ref 0–6)
NEUTS SEG # BLD: 9.2 K/UL (ref 1.8–8)
NEUTS SEG NFR BLD AUTO: 61 % (ref 32–75)
PHOSPHATE SERPL-MCNC: 3.5 MG/DL (ref 2.6–4.7)
PLATELET # BLD AUTO: 352 K/UL (ref 150–400)
POTASSIUM SERPL-SCNC: 4.4 MMOL/L (ref 3.5–5.1)
PROTHROMBIN TIME: 34.3 SEC (ref 9–11.1)
RBC # BLD AUTO: 2.99 M/UL (ref 3.8–5.2)
RBC MORPH BLD: ABNORMAL
SERVICE CMNT-IMP: ABNORMAL
SERVICE CMNT-IMP: NORMAL
SODIUM SERPL-SCNC: 142 MMOL/L (ref 136–145)
WBC # BLD AUTO: 14.9 K/UL (ref 3.6–11)

## 2017-03-06 PROCEDURE — 97116 GAIT TRAINING THERAPY: CPT

## 2017-03-06 PROCEDURE — 74011250637 HC RX REV CODE- 250/637: Performed by: FAMILY MEDICINE

## 2017-03-06 PROCEDURE — 94640 AIRWAY INHALATION TREATMENT: CPT

## 2017-03-06 PROCEDURE — 82962 GLUCOSE BLOOD TEST: CPT

## 2017-03-06 PROCEDURE — 74011250637 HC RX REV CODE- 250/637: Performed by: INTERNAL MEDICINE

## 2017-03-06 PROCEDURE — 80048 BASIC METABOLIC PNL TOTAL CA: CPT | Performed by: INTERNAL MEDICINE

## 2017-03-06 PROCEDURE — 85610 PROTHROMBIN TIME: CPT | Performed by: INTERNAL MEDICINE

## 2017-03-06 PROCEDURE — 74011636637 HC RX REV CODE- 636/637: Performed by: FAMILY MEDICINE

## 2017-03-06 PROCEDURE — 84100 ASSAY OF PHOSPHORUS: CPT | Performed by: INTERNAL MEDICINE

## 2017-03-06 PROCEDURE — 77010033678 HC OXYGEN DAILY

## 2017-03-06 PROCEDURE — 65660000000 HC RM CCU STEPDOWN

## 2017-03-06 PROCEDURE — 74011250636 HC RX REV CODE- 250/636: Performed by: PHYSICIAN ASSISTANT

## 2017-03-06 PROCEDURE — 94660 CPAP INITIATION&MGMT: CPT

## 2017-03-06 PROCEDURE — A9270 NON-COVERED ITEM OR SERVICE: HCPCS | Performed by: INTERNAL MEDICINE

## 2017-03-06 PROCEDURE — 74011636637 HC RX REV CODE- 636/637: Performed by: INTERNAL MEDICINE

## 2017-03-06 PROCEDURE — 74011000250 HC RX REV CODE- 250: Performed by: NURSE PRACTITIONER

## 2017-03-06 PROCEDURE — 36415 COLL VENOUS BLD VENIPUNCTURE: CPT | Performed by: INTERNAL MEDICINE

## 2017-03-06 PROCEDURE — 74011250636 HC RX REV CODE- 250/636: Performed by: INTERNAL MEDICINE

## 2017-03-06 PROCEDURE — 85025 COMPLETE CBC W/AUTO DIFF WBC: CPT | Performed by: INTERNAL MEDICINE

## 2017-03-06 PROCEDURE — 83735 ASSAY OF MAGNESIUM: CPT | Performed by: INTERNAL MEDICINE

## 2017-03-06 PROCEDURE — 97161 PT EVAL LOW COMPLEX 20 MIN: CPT

## 2017-03-06 RX ORDER — PREDNISONE 5 MG/1
10 TABLET ORAL
Status: DISCONTINUED | OUTPATIENT
Start: 2017-03-10 | End: 2017-03-10 | Stop reason: HOSPADM

## 2017-03-06 RX ORDER — BUMETANIDE 1 MG/1
1 TABLET ORAL 2 TIMES DAILY
Status: DISCONTINUED | OUTPATIENT
Start: 2017-03-06 | End: 2017-03-07

## 2017-03-06 RX ORDER — PREDNISONE 20 MG/1
20 TABLET ORAL
Status: COMPLETED | OUTPATIENT
Start: 2017-03-07 | End: 2017-03-09

## 2017-03-06 RX ORDER — MAGNESIUM SULFATE HEPTAHYDRATE 40 MG/ML
2 INJECTION, SOLUTION INTRAVENOUS ONCE
Status: COMPLETED | OUTPATIENT
Start: 2017-03-06 | End: 2017-03-06

## 2017-03-06 RX ORDER — WARFARIN 1 MG/1
1.5 TABLET ORAL ONCE
Status: COMPLETED | OUTPATIENT
Start: 2017-03-06 | End: 2017-03-06

## 2017-03-06 RX ORDER — CALCITRIOL 0.25 UG/1
0.25 CAPSULE ORAL DAILY
Status: DISCONTINUED | OUTPATIENT
Start: 2017-03-06 | End: 2017-03-10 | Stop reason: HOSPADM

## 2017-03-06 RX ADMIN — BUMETANIDE 1 MG: 1 TABLET ORAL at 17:50

## 2017-03-06 RX ADMIN — INSULIN LISPRO 3 UNITS: 100 INJECTION, SOLUTION INTRAVENOUS; SUBCUTANEOUS at 17:52

## 2017-03-06 RX ADMIN — HYDRALAZINE HYDROCHLORIDE 25 MG: 25 TABLET, FILM COATED ORAL at 09:53

## 2017-03-06 RX ADMIN — HUMAN INSULIN 22 UNITS: 100 INJECTION, SUSPENSION SUBCUTANEOUS at 21:00

## 2017-03-06 RX ADMIN — GUAIFENESIN 600 MG: 600 TABLET, EXTENDED RELEASE ORAL at 09:53

## 2017-03-06 RX ADMIN — BUMETANIDE 1 MG: 1 TABLET ORAL at 11:41

## 2017-03-06 RX ADMIN — INSULIN LISPRO 30 UNITS: 100 INJECTION, SUSPENSION SUBCUTANEOUS at 13:50

## 2017-03-06 RX ADMIN — Medication 10 ML: at 21:51

## 2017-03-06 RX ADMIN — ALBUTEROL SULFATE 2.5 MG: 2.5 SOLUTION RESPIRATORY (INHALATION) at 19:44

## 2017-03-06 RX ADMIN — CALCITRIOL 0.25 MCG: 0.25 CAPSULE, LIQUID FILLED ORAL at 11:41

## 2017-03-06 RX ADMIN — MAGNESIUM GLUCONATE 500 MG ORAL TABLET 400 MG: 500 TABLET ORAL at 17:50

## 2017-03-06 RX ADMIN — PREDNISONE 30 MG: 5 TABLET ORAL at 09:53

## 2017-03-06 RX ADMIN — ALBUTEROL SULFATE 2.5 MG: 2.5 SOLUTION RESPIRATORY (INHALATION) at 15:31

## 2017-03-06 RX ADMIN — ISOSORBIDE MONONITRATE 120 MG: 30 TABLET ORAL at 09:52

## 2017-03-06 RX ADMIN — Medication 10 ML: at 21:42

## 2017-03-06 RX ADMIN — DOCUSATE SODIUM 100 MG: 100 CAPSULE ORAL at 21:39

## 2017-03-06 RX ADMIN — HYDRALAZINE HYDROCHLORIDE 25 MG: 25 TABLET, FILM COATED ORAL at 17:50

## 2017-03-06 RX ADMIN — PANTOPRAZOLE SODIUM 40 MG: 40 TABLET, DELAYED RELEASE ORAL at 07:05

## 2017-03-06 RX ADMIN — CARVEDILOL 25 MG: 12.5 TABLET, FILM COATED ORAL at 17:51

## 2017-03-06 RX ADMIN — SUCRALFATE 1 G: 1 TABLET ORAL at 17:51

## 2017-03-06 RX ADMIN — ONDANSETRON 4 MG: 2 INJECTION INTRAMUSCULAR; INTRAVENOUS at 17:51

## 2017-03-06 RX ADMIN — SUCRALFATE 1 G: 1 TABLET ORAL at 11:41

## 2017-03-06 RX ADMIN — ATORVASTATIN CALCIUM 80 MG: 20 TABLET, FILM COATED ORAL at 17:51

## 2017-03-06 RX ADMIN — HUMAN INSULIN 15 UNITS: 100 INJECTION, SUSPENSION SUBCUTANEOUS at 09:54

## 2017-03-06 RX ADMIN — MAGNESIUM GLUCONATE 500 MG ORAL TABLET 400 MG: 500 TABLET ORAL at 09:53

## 2017-03-06 RX ADMIN — OXYCODONE HYDROCHLORIDE AND ACETAMINOPHEN 1 TABLET: 5; 325 TABLET ORAL at 17:51

## 2017-03-06 RX ADMIN — ASPIRIN 81 MG CHEWABLE TABLET 81 MG: 81 TABLET CHEWABLE at 09:53

## 2017-03-06 RX ADMIN — INSULIN LISPRO 3 UNITS: 100 INJECTION, SOLUTION INTRAVENOUS; SUBCUTANEOUS at 11:41

## 2017-03-06 RX ADMIN — FLUTICASONE PROPIONATE 2 SPRAY: 50 SPRAY, METERED NASAL at 09:57

## 2017-03-06 RX ADMIN — SUCRALFATE 1 G: 1 TABLET ORAL at 07:05

## 2017-03-06 RX ADMIN — ALBUTEROL SULFATE 2.5 MG: 2.5 SOLUTION RESPIRATORY (INHALATION) at 11:13

## 2017-03-06 RX ADMIN — DOXYCYCLINE HYCLATE 100 MG: 100 TABLET, COATED ORAL at 07:05

## 2017-03-06 RX ADMIN — SUCRALFATE 1 G: 1 TABLET ORAL at 21:50

## 2017-03-06 RX ADMIN — INSULIN LISPRO 30 UNITS: 100 INJECTION, SUSPENSION SUBCUTANEOUS at 17:51

## 2017-03-06 RX ADMIN — ERYTHROPOIETIN 10000 UNITS: 10000 INJECTION, SOLUTION INTRAVENOUS; SUBCUTANEOUS at 11:52

## 2017-03-06 RX ADMIN — MAGNESIUM SULFATE HEPTAHYDRATE 2 G: 40 INJECTION, SOLUTION INTRAVENOUS at 11:40

## 2017-03-06 RX ADMIN — DOXYCYCLINE HYCLATE 100 MG: 100 TABLET, COATED ORAL at 21:39

## 2017-03-06 RX ADMIN — Medication 10 ML: at 07:04

## 2017-03-06 RX ADMIN — HYDRALAZINE HYDROCHLORIDE 25 MG: 25 TABLET, FILM COATED ORAL at 21:50

## 2017-03-06 RX ADMIN — GUAIFENESIN 600 MG: 600 TABLET, EXTENDED RELEASE ORAL at 21:39

## 2017-03-06 RX ADMIN — WARFARIN SODIUM 1.5 MG: 1 TABLET ORAL at 17:51

## 2017-03-06 RX ADMIN — CARVEDILOL 25 MG: 12.5 TABLET, FILM COATED ORAL at 09:52

## 2017-03-06 RX ADMIN — Medication 10 ML: at 07:05

## 2017-03-06 RX ADMIN — ALBUTEROL SULFATE 2.5 MG: 2.5 SOLUTION RESPIRATORY (INHALATION) at 07:52

## 2017-03-06 RX ADMIN — Medication 10 ML: at 22:00

## 2017-03-06 RX ADMIN — INSULIN LISPRO 30 UNITS: 100 INJECTION, SUSPENSION SUBCUTANEOUS at 09:53

## 2017-03-06 RX ADMIN — LINACLOTIDE 290 MCG: 145 CAPSULE, GELATIN COATED ORAL at 07:05

## 2017-03-06 RX ADMIN — INSULIN LISPRO 2 UNITS: 100 INJECTION, SOLUTION INTRAVENOUS; SUBCUTANEOUS at 21:54

## 2017-03-06 RX ADMIN — ALBUTEROL SULFATE 2.5 MG: 2.5 SOLUTION RESPIRATORY (INHALATION) at 00:28

## 2017-03-06 RX ADMIN — ALBUTEROL SULFATE 2.5 MG: 2.5 SOLUTION RESPIRATORY (INHALATION) at 04:19

## 2017-03-06 NOTE — PROGRESS NOTES
Medical Progress Note      NAME: Alex Dejesus   :  1955  MRM:  [id number]    Date/Time: 3/6/2017  1:44 PM       Assessment / Plan:     Hx of HTN, DM, DVT, CAD s/p CABG, CKD 4, COPD, ILD, presented with dyspnea, COPD, ILD.  Complicated by worsening renal failure. Patient wanted to transfer care to Hospitalist service on      Acute on chronic respiratory failure/hypoxia: likely due to COPD and underlying mild RB-ILD. No evidence for pneumonia. V/Q scan neg for PE but poor lungs/substrate. Echo neg for pericardial effusion. Needs RHC at some point. Dyspnea out of proportion to lung disease, nephrology to direct diuresis. Wean oxygen, assess with ambulation. Taper steroids and nebs. Appreciate pulm input     COPD w/ acute exacerbation: cont above therapy. taper steroids (at 30 PO, taper over 10 days to off)    Obstructive sleep apnea. CPAP qhs     L sided chest pain: has significant cardiac hx and equates to stable angina but may be MSK. ECG and CE negative. Cont pain meds prn. Monitor closely. Would probably need LHC once renal function recovers     Chronic diastolic CHF/HTN: monitor volume. Cont coreg, imdur, hydralazine. Holding bumex due to renal failure but worry with 10# wt gain that we may be seeing an acute exacerbation that may potentiation hypoxemia. Nephrology to direct diuresis     ARF/CKD 4: unclear etiology. Slowly improving. Nephrology following     DM type 2 w/ renal complications: last E5Q 3.7%. BS elevated from steroids. DM educator following. Increased home insulin 75/25 to 30 units tid. Monitor BG closely with dynamic renal function but current level of insulin resistance makes hypoglycemia unlikely. Also started NPH bid for steroids and will need to taper off as well.     LLE DVT: Repeat VD shows resolution but with risk of PAH and ongoing lung parenchyma dysfunction, will cont coumadin, monitor INR.  Pharm dosing           Subjective:     Chief Complaint:  \"I keep gaining weight\"    ROS:  (bold if positive, if negative)    CoughSOB/LARA  Tolerating PT  Tolerating Diet          Objective:       Vitals:          Last 24hrs VS reviewed since prior progress note. Most recent are:    Visit Vitals    /71 (BP 1 Location: Right arm, BP Patient Position: Sitting)    Pulse 78    Temp 98.5 °F (36.9 °C)    Resp 18    Ht 5' 10\" (1.778 m)    Wt 155.4 kg (342 lb 9.5 oz)    SpO2 98%    BMI 49.16 kg/m2     SpO2 Readings from Last 6 Encounters:   03/06/17 98%   02/23/17 96%   02/20/17 97%   02/11/17 97%   02/10/17 99%   01/25/17 98%    O2 Flow Rate (L/min): 2 l/min     No intake or output data in the 24 hours ending 03/06/17 1133       Exam:     Physical Exam:    Gen:  Well-developed, well-nourished, in no acute distress  HEENT:  Pink conjunctivae, PERRL, hearing intact to voice, moist mucous membranes  Neck:  Supple, without masses, thyroid non-tender  Resp:  No accessory muscle use, fine bilateral crackles without any wheezing  Card:  No murmurs, normal S1, S2 without thrills, bruits. +1-2 bilateral edema  Abd:  Soft, non-tender, non-distended, normoactive bowel sounds are present  Musc:  No cyanosis or clubbing  Skin:  No rashes, skin turgor is good  Neuro:   Face symmetric, tongue midline, speech fluent,  strength is 5/5 bilaterally and dorsi / plantarflexion is 5/5 bilaterally, follows commands appropriately  Psych:  Good insight, oriented to person, place and time, alert       Telemetry reviewed:   normal sinus rhythm    Medications Reviewed: (see below)    Lab Data Reviewed: (see below)    ______________________________________________________________________    Medications:     Current Facility-Administered Medications   Medication Dose Route Frequency    magnesium sulfate 2 g/50 ml IVPB (premix or compounded)  2 g IntraVENous ONCE    calcitRIOL (ROCALTROL) capsule 0.25 mcg  0.25 mcg Oral DAILY    epoetin charlie (EPOGEN;PROCRIT) injection 10,000 Units  10,000 Units SubCUTAneous Q7D    bumetanide (BUMEX) tablet 1 mg  1 mg Oral BID    warfarin (COUMADIN) tablet 1.5 mg  1.5 mg Oral ONCE    magnesium oxide (MAG-OX) tablet 400 mg  400 mg Oral BID    predniSONE (DELTASONE) tablet 30 mg  30 mg Oral DAILY WITH BREAKFAST    albuterol (PROVENTIL VENTOLIN) nebulizer solution 2.5 mg  2.5 mg Nebulization Q4H RT    insulin lispro protamine/insulin lispro (HUMALOG MIX 75/25) injection 30 Units  30 Units SubCUTAneous TIDPC    nitroglycerin (NITROSTAT) tablet 0.4 mg  0.4 mg SubLINGual PRN    linaclotide (LINZESS) capsule 290 mcg  290 mcg Oral ACB    tiotropium-olodaterol 2.5-2.5 mcg/actuation mist 2 Puff  2 Puff Inhalation ACL    guaiFENesin ER (MUCINEX) tablet 600 mg  600 mg Oral Q12H    benzonatate (TESSALON) capsule 100 mg  100 mg Oral TID PRN    fluticasone (FLONASE) 50 mcg/actuation nasal spray 2 Spray  2 Spray Both Nostrils DAILY    doxycycline (VIBRA-TABS) tablet 100 mg  100 mg Oral Q12H    insulin NPH (NOVOLIN N, HUMULIN N) injection 15 Units  15 Units SubCUTAneous Q12H    ondansetron (ZOFRAN) injection 4 mg  4 mg IntraVENous Q8H PRN    isosorbide mononitrate ER (IMDUR) tablet 120 mg  120 mg Oral DAILY    oxyCODONE-acetaminophen (PERCOCET) 5-325 mg per tablet 1 Tab  1 Tab Oral Q8H PRN    polyethylene glycol (MIRALAX) packet 17 g  17 g Oral DAILY PRN    sodium chloride (NS) flush 5-10 mL  5-10 mL IntraVENous Q8H    sodium chloride (NS) flush 5-10 mL  5-10 mL IntraVENous PRN    insulin lispro (HUMALOG) injection   SubCUTAneous AC&HS    glucose chewable tablet 16 g  4 Tab Oral PRN    dextrose (D50W) injection syrg 12.5-25 g  12.5-25 g IntraVENous PRN    glucagon (GLUCAGEN) injection 1 mg  1 mg IntraMUSCular PRN    docusate sodium (COLACE) capsule 100 mg  100 mg Oral QHS    hydrALAZINE (APRESOLINE) tablet 25 mg  25 mg Oral TID    sucralfate (CARAFATE) tablet 1 g  1 g Oral AC&HS    carvedilol (COREG) tablet 25 mg  25 mg Oral BID WITH MEALS    pantoprazole (PROTONIX) tablet 40 mg  40 mg Oral ACB    aspirin chewable tablet 81 mg  81 mg Oral DAILY    atorvastatin (LIPITOR) tablet 80 mg  80 mg Oral QPM    Warfarin- Pharmacy Dosing   Other Rx Dosing/Monitoring            Lab Review:     Recent Labs      03/06/17 0713  03/05/17   0511  03/04/17   0750   WBC  14.9*  12.7*  12.1*   HGB  8.8*  8.1*  9.0*   HCT  27.7*  30.2*  27.7*   PLT  352  295  333     Recent Labs      03/06/17   0713  03/05/17   0511  03/04/17   0750   NA  142  138  138   K  4.4  4.5  4.5   CL  107  105  104   CO2  25  23  24   GLU  111*  187*  187*   BUN  63*  64*  60*   CREA  3.39*  3.59*  3.63*   CA  8.1*  7.9*  8.1*   MG  1.3*  1.1*  1.1*   PHOS  3.5  3.2  3.3   INR  3.3*   --    --      Lab Results   Component Value Date/Time    Glucose (POC) 162 03/06/2017 11:10 AM    Glucose (POC) 104 03/06/2017 07:26 AM    Glucose (POC) 261 03/05/2017 08:33 PM    Glucose (POC) 344 03/05/2017 05:46 PM    Glucose (POC) 212 03/05/2017 11:13 AM         Total time spent with patient: [phone] [address] discussed with: Patient, Nursing Staff and >50% of time spent in counseling and coordination of care    Discussed:  Care Plan and D/C Planning    Prophylaxis:  Coumadin    Disposition:  Home w/Family           ___________________________________________________    Attending Physician: Marnie Alex DO

## 2017-03-06 NOTE — DISCHARGE SUMMARY
Physician Interim Summary   Patient ID:  Luis Daniel Landry  [id number]  89 y.o.  1955    PCP: Lily Paez DO     Consults: Cardiology, Pulmonary/Intensive care and Nephrology    Covering dates: 3/3/2017 through 3/6/2017    Admission Diagnoses: HCAP (healthcare-associated pneumonia)    Hospital Course:   Principal Problem:    HCAP (healthcare-associated pneumonia) (2/28/2017)    Active Problems:    CAD (coronary Artery Disease) Native Artery (2/16/2011)      Overview: [place] 2004      1v CAD by cath - PCI OM with SAL      Cath 5/2004 - patent stent, no new disease      Lexiscan cardiolite 12/09- normal perfusion, EF 66%      Cath: 3/19/12: PA 55/30 mean 41, wedge 26, RA 24, EDP 35, LVG 55%, LM       normal, LAD normal, LCX - OM1 patent stent, OM2 prox 85% long, %       with L to R collaterals             Interstitial lung disease ([address] (2/28/2011)      Pulmonary HTN ([address] (3/21/2012)      HTN (hypertension) (10/1/2012)      Morbid obesity (10/1/2012)      Chronic diastolic heart failure ([address] (10/5/2012)      DJD (degenerative joint disease) of knee (10/30/2012)      DM (diabetes mellitus), type 2 with renal complications ([address] (0/5/3843)      DVT (deep venous thrombosis) ([address] (2/2/2017)      CKD (chronic kidney disease) stage 4, GFR 15-29 ml/min (formerly [facility]) (2/10/2017)      Acute and chronic respiratory failure with hypoxia (formerly [facility]) (2/10/2017)      Acute exacerbation of chronic obstructive pulmonary disease (COPD) ([address] (3/1/2017)    We assumed care of Ms. Joceline Chong after she requested transfer to the hospitalist service on the evening of 3/2 from [facility] service. She is being followed for acute on chronic hypoxemic respiratory failure complicated by acute renal failure on CKD4. Her dyspnea is due to both volume and a known hx of RB-ILD. She remains on steroids and renal function has been slow to recover. We have initiated gentle loop diuresis as of 3/6.     Please see progress note for further details      Additional hospital course and discharge summary will be done by discharging physician.

## 2017-03-06 NOTE — ROUTINE PROCESS
Bedside shift change report given to Margarita Alvarado RN (oncoming nurse) by Karlyne Kanner, RN (offgoing nurse). Report included the following information SBAR, Kardex, MAR and Recent Results.

## 2017-03-06 NOTE — PROGRESS NOTES
[facility] Dosing Services: 3/6/2017    Consult for Warfarin Dosing by Pharmacy by Dr. Justin Levi provided for this 63 y.o. female , for indication of Venous Thrombosis. Day of Therapy - PTA: 4mg on Mon, Fri, 2mg rest of the week  Dose to achieve an INR goal of 2-3     Order entered for Warfarin 1.5 mg to be given today at 1800.  - INR changed to daily     Significant drug interactions: None  Previous dose given 2 mg yesterday   PT/INR Lab Results   Component Value Date/Time    INR 3.3 03/06/2017 07:13 AM      Platelets Lab Results   Component Value Date/Time    PLATELET 998 41/43/4123 07:13 AM      H/H Lab Results   Component Value Date/Time    HGB 8.8 03/06/2017 07:13 AM        Pharmacy to follow daily and will provide subsequent Warfarin dosing based on clinical status.   [facility], [address] [facility] [facility])  Contact information [phone]

## 2017-03-06 NOTE — PROGRESS NOTES
Occupational therapy note:  Orders received, chart reviewed. Patient seen and cleared for OT services in ED on 3/1/17. At that time, patient at modified independent level for ADL tasks. Patient with home O2, familiar with use and awareness of tubing during activity. Patient requires rest breaks during activity but able to complete without physical assistance. New orders noted today and patient received sitting in bedside chair, wearing own clothes. Patient nurse present in room. Patient recognized this therapist and with discussion notes she still does not require OT services. Patient has been in/out of bathroom in room, performing full bathing, toileting and dressing tasks. Nursing in agreement. Patient without need for acute skilled OT services and encouraged to perform daily tasks for continued strength and activity tolerance. Patient verbalized understanding. Will complete new order. Maryjane Saxena MS OTR/L

## 2017-03-06 NOTE — PROGRESS NOTES
Cardiology Progress Note     5th floor     NAME:  Luis Daniel Landry   :   1955   MRN:   [id number]     Assessment/Plan:   1. Diastolic CHF: 20 # weight gain since admission. Diuresis on hold given renal function. Diuresis per renal.   2. CAD/ chest pain now resolved. CT chest show heavy coronary calcium and has known history of CAD with stents. Ideally needs cath but will need to wait till renal function is stable. Cont medical mgt: asa, BB, statin   3. CKD: renal following. 4. Dyspnea multifactorial:  CHF, sedentary, obesity. 5. PNA: doxy. 6. Previous LLE DVT: coumadin    Cardiology Attending:    Patient personally seen and examined. All the elements of history and examination were personally performed. Assessment and plan was discussed and agree as written above. Yuan Sam MD, [facility]       Subjective:   Luis Daniel Landry is a 63 y.o. female with past history of CAD, Stents, HTN, CKD, Diastolic CHF,   Admitted with increasing SOB and pneumonia. She also complained of left sided, dull aching, moderate, left sided anterior chest pain radiating to the left arm associated with the SOB. No fever, chills, presyncope or syncope. Cardiac ROS: Patient denies any exertional chest pain, palpitations, syncope, orthopnea, or paroxysmal nocturnal dyspnea. Overnight events:  5# weight gain  Inc SOB  Back pain with deep inspiration   Cr 3.39    Previous Cardiac Eval  1v CAD by cath - PCI OM with SAL  Cath 2004 - patent stent, no new disease  Lexiscan cardiolite - normal perfusion, EF 66%  Cath: 3/19/12: PA 55/30 mean 41, wedge 26, RA 24, EDP 35, LVG 55%, LM normal, LAD normal, LCX - OM1 patent stent, OM2 prox 85% long, % with L to R collaterals      Review of Systems: +  Low back pain No nausea, indigestion, vomiting, cough, sputum. No bleeding. Taking po. Up to chair.           Objective:     Visit Vitals    /71 (BP 1 Location: Right arm, BP Patient Position: Sitting)    Pulse 78    Temp 98.5 °F (36.9 °C)    Resp 18    Ht 5' 10\" (1.778 m)    Wt 342 lb 9.5 oz (155.4 kg)    SpO2 98%    BMI 49.16 kg/m2    O2 Flow Rate (L/min): 2 l/min O2 Device: Nasal cannula    Temp (24hrs), Av.9 °F (36.6 °C), Min:96.8 °F (36 °C), Max:98.5 °F (36.9 °C)           1901 -  07  In: 1330 [P.O.:1330]  Out: 400 [Urine:400]  TELE: SR PVC's     General: AAOx3 cooperative, no acute distress. HEENT: Atraumatic. Pink and moist.  Anicteric sclerae. Neck : Supple, no thyromegaly. Lungs: Dim bases  Heart: Regular rhythm, no murmur, no rubs, no gallops. No JVD. No carotid bruits. Abdomen: Obese, soft, non-distended, non-tender. + Bowel sounds. No bruits. Extremities: + 1 LE edema, Jatinder hose on, no clubbing, no cyanosis. No calf tenderness  Neurologic: Grossly intact. Alert and oriented X 3. No acute neurological distress. Psych: Good insight. Not anxious or agitated.         Care Plan discussed with:    Comments   Patient x    Family      RN x    Care Manager                    Consultant:          Data Review:     No lab exists for component: ITNL   Recent Labs      17   1420   CPK  43   CKMB  <1.0   TROIQ  <0.04     Recent Labs      17   0713  17   0511  17   0750   NA  142  138  138   K  4.4  4.5  4.5   CL  107  105  104   CO2  25  23  24   BUN  63*  64*  60*   CREA  3.39*  3.59*  3.63*   GLU  111*  187*  187*   PHOS  3.5  3.2  3.3   MG  1.3*  1.1*  1.1*   WBC  14.9*  12.7*  12.1*   HGB  8.8*  8.1*  9.0*   HCT  27.7*  30.2*  27.7*   PLT  352  295  333     Recent Labs      17   0713   INR  3.3*   PTP  34.3*       Medications reviewed  Current Facility-Administered Medications   Medication Dose Route Frequency    magnesium oxide (MAG-OX) tablet 400 mg  400 mg Oral BID    predniSONE (DELTASONE) tablet 30 mg  30 mg Oral DAILY WITH BREAKFAST    albuterol (PROVENTIL VENTOLIN) nebulizer solution 2.5 mg  2.5 mg Nebulization Q4H RT    insulin lispro protamine/insulin lispro (HUMALOG MIX 75/25) injection 30 Units  30 Units SubCUTAneous TIDPC    nitroglycerin (NITROSTAT) tablet 0.4 mg  0.4 mg SubLINGual PRN    linaclotide (LINZESS) capsule 290 mcg  290 mcg Oral ACB    tiotropium-olodaterol 2.5-2.5 mcg/actuation mist 2 Puff  2 Puff Inhalation ACL    guaiFENesin ER (MUCINEX) tablet 600 mg  600 mg Oral Q12H    benzonatate (TESSALON) capsule 100 mg  100 mg Oral TID PRN    fluticasone (FLONASE) 50 mcg/actuation nasal spray 2 Spray  2 Spray Both Nostrils DAILY    doxycycline (VIBRA-TABS) tablet 100 mg  100 mg Oral Q12H    insulin NPH (NOVOLIN N, HUMULIN N) injection 15 Units  15 Units SubCUTAneous Q12H    ondansetron (ZOFRAN) injection 4 mg  4 mg IntraVENous Q8H PRN    isosorbide mononitrate ER (IMDUR) tablet 120 mg  120 mg Oral DAILY    oxyCODONE-acetaminophen (PERCOCET) 5-325 mg per tablet 1 Tab  1 Tab Oral Q8H PRN    polyethylene glycol (MIRALAX) packet 17 g  17 g Oral DAILY PRN    sodium chloride (NS) flush 5-10 mL  5-10 mL IntraVENous Q8H    sodium chloride (NS) flush 5-10 mL  5-10 mL IntraVENous PRN    sodium chloride (NS) flush 5-10 mL  5-10 mL IntraVENous Q8H    sodium chloride (NS) flush 5-10 mL  5-10 mL IntraVENous PRN    insulin lispro (HUMALOG) injection   SubCUTAneous AC&HS    glucose chewable tablet 16 g  4 Tab Oral PRN    dextrose (D50W) injection syrg 12.5-25 g  12.5-25 g IntraVENous PRN    glucagon (GLUCAGEN) injection 1 mg  1 mg IntraMUSCular PRN    docusate sodium (COLACE) capsule 100 mg  100 mg Oral QHS    hydrALAZINE (APRESOLINE) tablet 25 mg  25 mg Oral TID    sucralfate (CARAFATE) tablet 1 g  1 g Oral AC&HS    carvedilol (COREG) tablet 25 mg  25 mg Oral BID WITH MEALS    pantoprazole (PROTONIX) tablet 40 mg  40 mg Oral ACB    aspirin chewable tablet 81 mg  81 mg Oral DAILY    atorvastatin (LIPITOR) tablet 80 mg  80 mg Oral QPM    Warfarin- Pharmacy Dosing   Other Rx Dosing/Monitoring Brooklyn Caceres, NP

## 2017-03-06 NOTE — ROUTINE PROCESS
Bedside and Verbal shift change report given to [address] (oncoming nurse) by Alex Nielsen (offgoing nurse). Report included the following information SBAR, Kardex, ED Summary, Intake/Output, MAR, Accordion, Recent Results and Med Rec Status.

## 2017-03-06 NOTE — PROGRESS NOTES
[facility] PROGRESS NOTE  Pulmonary, Critical Care, and Sleep Medicine    Name: Gee Cooper MRN: [id number]   : 1955 Hospital: [facility]   Date: 3/6/2017  Admission Date: 2017     Chart and notes reviewed. Data reviewed. I review the patient's current medications in the medical record at each encounter.  I have evaluated and examined the patient. .     Overnight events reviewed:  Afebrile and hemodynamically stable overnight  O2 sats 99% on 2L NC  I&O:  + 480, but no documentation of output  Weight 155.4 kg vs 153 yesterday  Started back on Bumex this morning  WBC 14.9  Creatinine 3.39    ROS:  Feeling better today. Breathing improved and able to walk the halls. She states that she can feel the fluid building up. Vital Signs:  Visit Vitals    /66 (BP 1 Location: Right arm, BP Patient Position: Sitting)    Pulse 79    Temp 98.8 °F (37.1 °C)    Resp 18    Ht 5' 10\" (1.778 m)    Wt 155.4 kg (342 lb 9.5 oz)    SpO2 99%    BMI 49.16 kg/m2       O2 Device: Nasal cannula   O2 Flow Rate (L/min): 2 l/min   Temp (24hrs), Av.9 °F (36.6 °C), Min:96.8 °F (36 °C), Max:98.8 °F (37.1 °C)       Intake/Output:   Last shift:         Last 3 shifts:  1901 -  0700  In: 1330 [P.O.:1330]  Out: 400 [Urine:400]  No intake or output data in the 24 hours ending 17 1409     Telemetry:     Physical Exam:   General:  Alert, cooperative, no distress, appears stated age. Head:  Normocephalic, without obvious abnormality, atraumatic. Eyes:  Conjunctivae/corneas clear. Nose: Nares normal. Septum midline. Mucosa normal.    Throat: Lips, mucosa, and tongue normal. Teeth and gums normal.   Neck: Supple, symmetrical, trachea midline, no adenopathy   Lungs:   Diminished bs bilaterally   Chest wall:  No tenderness or deformity. Heart:  Regular rate and rhythm, S1, S2 normal, no murmur, click, rub or gallop. Abdomen:   Soft, non-tender, obese. Extremities: Extremities normal, atraumatic, no cyanosis or edema. Pulses: 2+ and symmetric all extremities. Skin: Skin color, texture, turgor normal. No rashes or lesions   Lymph nodes: Cervical nodes normal.   Neurologic: Grossly nonfocal     DATA:  MAR reviewed and pertinent medications noted or modified as needed    Labs:  Recent Labs      03/06/17 0713  03/05/17   0511  03/04/17   0750   WBC  14.9*  12.7*  12.1*   HGB  8.8*  8.1*  9.0*   HCT  27.7*  30.2*  27.7*   PLT  352  295  333     Recent Labs      03/06/17   0713  03/05/17   0511  03/04/17   0750   NA  142  138  138   K  4.4  4.5  4.5   CL  107  105  104   CO2  25  23  24   GLU  111*  187*  187*   BUN  63*  64*  60*   CREA  3.39*  3.59*  3.63*   CA  8.1*  7.9*  8.1*   MG  1.3*  1.1*  1.1*   PHOS  3.5  3.2  3.3   INR  3.3*   --    --        Imaging:  No new imaging today. Impression  · Chronic hypoxic respiratory failure: ILD stable  · History of RLE DVT; may also have PE, but allergic to contrast and had CKD  · Smoking related ILD on open lung biopsy at [address] [place] [address] in 2010. No obstructive dz on PFTs. · JASEN  · Chest pain: improved  · Diastolic CHF  · Pulmonary HTN  · HTN  · Acute/chronic kidney disease stage 4: worsening  · History of tobacco use; quit 11/2014      PLAN  · Supplemental O2 to keep sats >90%  · Continue scheduled nebs: switch to Albuterol as she is already receiving her home Stiolto  · Continue prednisone and taper: order placed today  · Can d/c Doxycycline today  · Start Tessalon and Flonase. · Renal following and appreciate help. Continue Bumex. · Will likely need right sided heart cath as outpt. Pt to follow up with Dr. Julia Kaur  · Pharmacy to dose Coumadin  · CPAP nightly with 2L bled in  · GI prophylaxis: Protonix    Ms. Ronda Figueroa is stable from a pulmonary perspective. We will sign off and arrange for outpatient follow-up in 2-3 weeks with . Thank you for allowing us to participate in Ms. [address] Maximilian, NP

## 2017-03-06 NOTE — PROGRESS NOTES
[address] [facility] [place]  YOB: 1955          Assessment & Plan:   1. ROSEANN on CKD 4  - CR better off loops  - at this time we will resume Loops, we will need to tolerate some degree of Pre renal azotemia to keep her less SOB  - no RRT needed but ew her  - RCN: Cath dw pt     CKD due to DM,HTN  ( )  - UA : No hematuria  - elevated PTH : 2 PTH: Start Vit D analogye  2. ILD  - Serologies pending but had biopsy proven Fibrosis due to smoking  - Complement now low  3. HTN  - Coreg,Hydralazine  4. DM  - Insulin  5. Res failure  - due to Obesity. ILD,Some volume  - sob might be also due to anemia  6. Anemia  - Iron panel ok  -  Add Epo       Subjective:   CC:ckd  HPI: Patient seen   ROSEANN on ckd: cr is better  Wt is up by 20 lbs per pt. Stable chronic SOB.     ROS:cp better  No v/d  Current Facility-Administered Medications   Medication Dose Route Frequency    magnesium sulfate 2 g/50 ml IVPB (premix or compounded)  2 g IntraVENous ONCE    magnesium oxide (MAG-OX) tablet 400 mg  400 mg Oral BID    predniSONE (DELTASONE) tablet 30 mg  30 mg Oral DAILY WITH BREAKFAST    albuterol (PROVENTIL VENTOLIN) nebulizer solution 2.5 mg  2.5 mg Nebulization Q4H RT    insulin lispro protamine/insulin lispro (HUMALOG MIX 75/25) injection 30 Units  30 Units SubCUTAneous TIDPC    nitroglycerin (NITROSTAT) tablet 0.4 mg  0.4 mg SubLINGual PRN    linaclotide (LINZESS) capsule 290 mcg  290 mcg Oral ACB    tiotropium-olodaterol 2.5-2.5 mcg/actuation mist 2 Puff  2 Puff Inhalation ACL    guaiFENesin ER (MUCINEX) tablet 600 mg  600 mg Oral Q12H    benzonatate (TESSALON) capsule 100 mg  100 mg Oral TID PRN    fluticasone (FLONASE) 50 mcg/actuation nasal spray 2 Spray  2 Spray Both Nostrils DAILY    doxycycline (VIBRA-TABS) tablet 100 mg  100 mg Oral Q12H    insulin NPH (NOVOLIN N, HUMULIN N) injection 15 Units  15 Units SubCUTAneous Q12H    ondansetron (ZOFRAN) injection 4 mg  4 mg IntraVENous Q8H PRN    isosorbide mononitrate ER (IMDUR) tablet 120 mg  120 mg Oral DAILY    oxyCODONE-acetaminophen (PERCOCET) 5-325 mg per tablet 1 Tab  1 Tab Oral Q8H PRN    polyethylene glycol (MIRALAX) packet 17 g  17 g Oral DAILY PRN    sodium chloride (NS) flush 5-10 mL  5-10 mL IntraVENous Q8H    sodium chloride (NS) flush 5-10 mL  5-10 mL IntraVENous PRN    sodium chloride (NS) flush 5-10 mL  5-10 mL IntraVENous Q8H    sodium chloride (NS) flush 5-10 mL  5-10 mL IntraVENous PRN    insulin lispro (HUMALOG) injection   SubCUTAneous AC&HS    glucose chewable tablet 16 g  4 Tab Oral PRN    dextrose (D50W) injection syrg 12.5-25 g  12.5-25 g IntraVENous PRN    glucagon (GLUCAGEN) injection 1 mg  1 mg IntraMUSCular PRN    docusate sodium (COLACE) capsule 100 mg  100 mg Oral QHS    hydrALAZINE (APRESOLINE) tablet 25 mg  25 mg Oral TID    sucralfate (CARAFATE) tablet 1 g  1 g Oral AC&HS    carvedilol (COREG) tablet 25 mg  25 mg Oral BID WITH MEALS    pantoprazole (PROTONIX) tablet 40 mg  40 mg Oral ACB    aspirin chewable tablet 81 mg  81 mg Oral DAILY    atorvastatin (LIPITOR) tablet 80 mg  80 mg Oral QPM    Warfarin- Pharmacy Dosing   Other Rx Dosing/Monitoring          Objective:     Vitals:  Blood pressure 130/71, pulse 78, temperature 98.5 °F (36.9 °C), resp. rate 18, height 5' 10\" (1.778 m), weight 155.4 kg (342 lb 9.5 oz), SpO2 98 %. Temp (24hrs), Av.9 °F (36.6 °C), Min:96.8 °F (36 °C), Max:98.5 °F (36.9 °C)      Intake and Output:      1901 -  0700  In: 1330 [P.O.:1330]  Out: 400 [Urine:400]    Physical Exam:                Patient is intubated:  no    Physical Examination:   GENERAL ASSESSMENT: NAD  HEENT:Nontraumatic   CHEST: Crackles  HEART: S1S2  ABDOMEN: Soft,NT,  :Myers: n  EXTREMITY: 1 EDEMA  NEURO:Grossly non focal          ECG/rhythm:    Data Review      No results for input(s): TNIPOC in the last 72 hours.     No lab exists for component: ITNL   Recent Labs      03/03/17   1420   CPK  43   CKMB  <1.0   TROIQ  <0.04     Recent Labs      03/06/17   0713  03/05/17   0511  03/04/17   0750   NA  142  138  138   K  4.4  4.5  4.5   CL  107  105  104   CO2  25  23  24   BUN  63*  64*  60*   CREA  3.39*  3.59*  3.63*   GLU  111*  187*  187*   PHOS  3.5  3.2  3.3   MG  1.3*  1.1*  1.1*   CA  8.1*  7.9*  8.1*   WBC  14.9*  12.7*  12.1*   HGB  8.8*  8.1*  9.0*   HCT  27.7*  30.2*  27.7*   PLT  352  295  333      Recent Labs      03/06/17   0713   INR  3.3*   PTP  34.3*     Needs: urine analysis, urine sodium, protein and creatinine  Lab Results   Component Value Date/Time    Sodium urine, random 25 11/10/2014 12:40 PM    Creatinine, urine 145.29 03/04/2017 05:01 AM         Discussed with:  Family, pt    : Muriel Gomez MD  3/6/2017        [facility]:  [email or web address]  [place] office:  [address] [place] [address], [address] [place] [address],[address]  [place], [address] [facility]  Phone: [phone]  Fax :     [phone]    [place] office:  [address] [facility], [address] [facility]  Phone - [phone]  Fax - [phone]

## 2017-03-06 NOTE — PROGRESS NOTES
Problem: Mobility Impaired (Adult and Pediatric)  Goal: *Acute Goals and Plan of Care (Insert Text)  PHYSICAL THERAPY EVALUATION/DISCHARGE  Patient: Riley Cornell (74 y.o. female)  Date: 3/6/2017  Primary Diagnosis: HCAP (healthcare-associated pneumonia)        Precautions:    (Has home O2)  ASSESSMENT :  Based on the objective data described below, the patient presents with admission due to PNA. Pt had been seen in ER and discharged due to mod independent level of mobility with rollator and existing need for 2LO2 at home PTA. New orders received for 6MWT. Pt received on 2LO2 in room with sats the 90's at rest.  Pt walking independently in room without need for RW. Provided RW for test of which pt only desaturated to 89%. Provided 1LO2 with improved sats to 91-92%. Noted SOB, yet pt stating always has this due to COPD. Left in NAD on previous 2LO2 via n/c. Skilled physical therapy is not indicated at this time.   Documentation for home O2:     ROOM AIR     AT REST    O2 SATS  95 HR  73   ROOM AIR WITH ACTIVITY 02 SATS  89 HR  93   (1    ) LITERS OF O2 WITH ACTIVITY O2 SATS  92 HR  93   (2    )LITERS OF 02 PATIENT LEFT COMFORTABLY  SITTING/SUPINE 02 SATS  91 HR  94                             PLAN :  Discharge Recommendations: Home Health  Further Equipment Recommendations for Discharge: has all DME       SUBJECTIVE:   Patient stated I always get SOB due to the COPD.      OBJECTIVE DATA SUMMARY:   HISTORY:    Past Medical History:   Diagnosis Date     Sleep Apnea 2/16/2011    Angina, class III ([address] 8/9/2013    Aortic aneurysm [facility]      CAD (coronary Artery Disease) Native Artery 2/16/2011    CKD (chronic kidney disease) stage 4, GFR 15-29 ml/min ([address] 2/10/2017    Diabetic gastroparesis (HCC)      Diastolic heart failure ([address] 10/5/2012    Esophageal stricture 2012     dialted Dr. Vamsi Peres G6PD deficiency ([address]       trait    GERD (gastroesophageal reflux disease)      Hypertensive Cardiovasc Dis Benign, No CHF 2/16/2011    ILD (interstitial lung disease) [facility]       open lung bx CJW 2010    OA (osteoarthritis)      Obesity 2/16/2011    Ovarian cancer ([facility]       cervical and uterine    Rheumatoid arteritis (HCC)      T2DM (type 2 diabetes mellitus) ([facility] 8/9/2013    Tobacco use disorder 3/21/2012    Uterine cervix cancer (HCC)      Vitamin D deficiency 8/9/2013     Past Surgical History:   Procedure Laterality Date    CARDIAC SURG PROCEDURE UNLIST         stents    COLONOSCOPY N/A 6/24/2016     COLONOSCOPY performed by Naa Foley MD at [facility] HX APPENDECTOMY        HX CARPAL TUNNEL RELEASE         bilateral    HX CHOLECYSTECTOMY        HX HERNIA REPAIR        HX HYSTERECTOMY        HX ORTHOPAEDIC   11/12/12     right knee replacement    HX TONSIL AND ADENOIDECTOMY        UPPER GI ENDOSCOPY,VINOD PINZON   6/24/2016           Prior Level of Function/Home Situation: lives with ; has home O2 2L; walks without asst device in home; rollator  Personal factors and/or comorbidities impacting plan of care: PNA;DM;DVT;CAD;COPD;CHF     Home Situation  Home Environment: Private residence  # Steps to Enter: 2  One/Two Story Residence: One story  Living Alone: No  Support Systems: Spouse/Significant Other/Partner, Child(mera)  Patient Expects to be Discharged to[de-identified] Private residence  Current DME Used/Available at Home: Shower chair, Walker, rollator, Walker, rolling, [place], straight, Commode, bedside  Tub or Shower Type: Shower     EXAMINATION/PRESENTATION/DECISION MAKING:   Critical Behavior:  Neurologic State: Alert  Orientation Level: Oriented X4  Cognition: Appropriate decision making, Appropriate for age attention/concentration, Appropriate safety awareness, Follows commands  Safety/Judgement: Awareness of environment  Skin:  IV; O2  Strength:    Strength: Generally decreased, functional                    Tone & Sensation:   Tone: Normal Sensation: Intact              Range Of Motion:  AROM: Generally decreased, functional                       Coordination:  Coordination: Within functional limits     Functional Mobility:  Bed Mobility:              Transfers:  Sit to Stand: Independent  Stand to Sit: Independent  Stand Pivot Transfers: Independent                    Balance:   Sitting: Intact  Standing: Intact; With support  Ambulation/Gait Training:  Distance (ft): 200 Feet (ft)  Assistive Device: Walker, rolling;Gait belt  Ambulation - Level of Assistance: Stand-by asssistance        Gait Abnormalities: Decreased step clearance        Base of Support: Widened     Speed/Nichole: Slow  Step Length: Right shortened;Left shortened                                              Functional Measure:  Barthel Index:      Bathin  Bladder: 10  Bowels: 10  Groomin  Dressing: 10  Feeding: 10  Mobility: 15  Stairs: 0  Toilet Use: 10  Transfer (Bed to Chair and Back): 15  Total: 90         Barthel and G-code impairment scale:  Percentage of impairment CH  0% CI  1-19% CJ  20-39% CK  40-59% CL  60-79% CM  80-99% CN  100%   Barthel Score 0-100 100 99-80 79-60 59-40 20-39 1-19    0   Barthel Score 0-20 20 17-19 13-16 9-12 5-8 1-4 0      The Barthel ADL Index: Guidelines  1. The index should be used as a record of what a patient does, not as a record of what a patient could do. 2. The main aim is to establish degree of independence from any help, physical or verbal, however minor and for whatever reason. 3. The need for supervision renders the patient not independent. 4. A patient's performance should be established using the best available evidence. Asking the patient, friends/relatives and nurses are the usual sources, but direct observation and common sense are also important. However direct testing is not needed. 5. Usually the patient's performance over the preceding 24-48 hours is important, but occasionally longer periods will be relevant.   6. Middle categories imply that the patient supplies over 50 per cent of the effort. 7. Use of aids to be independent is allowed. Adrienne Hernandez., Barthel, KO. (7857). Functional evaluation: the Barthel Index. [facility] (14)2. Armida Kimball mary YOVANNY Suárez, Kalin Spencer., Corrina Palms., Maria R, [facility] (1999). Measuring the change indisability after inpatient rehabilitation; comparison of the responsiveness of the Barthel Index and Functional [place] Measure. Journal of Neurology, Neurosurgery, and Psychiatry, 66(4), 339-492. Domenico Weaver, NPIA.A, JENIFFER Smith, & Jenn Wright M.A. (2004.) Assessment of post-stroke quality of life in cost-effectiveness studies: The usefulness of the Barthel Index and the EuroQoL-5D. Quality of Life Research, 13, 370-23         G codes: In compliance with CMSs Claims Based Outcome Reporting, the following G-code set was chosen for this patient based on their primary functional limitation being treated: The outcome measure chosen to determine the severity of the functional limitation was the barthel with a score of 90/100 which was correlated with the impairment scale.       · Mobility - Walking and Moving Around:               - CURRENT STATUS:    CI - 1%-19% impaired, limited or restricted               - GOAL STATUS:           CI - 1%-19% impaired, limited or restricted               - D/C STATUS:                       CI - 1%-19% impaired, limited or restricted         Physical Therapy Evaluation Charge Determination   History Examination Presentation Decision-Making   HIGH Complexity :3+ comorbidities / personal factors will impact the outcome/ POC  LOW Complexity : 1-2 Standardized tests and measures addressing body structure, function, activity limitation and / or participation in recreation  LOW Complexity : Stable, uncomplicated  Other outcome measures barthel  LOW       Based on the above components, the patient evaluation is determined to be of the following complexity level: LOW      Pain:  Pain Scale 1: Numeric (0 - 10)  Pain Intensity 1: 4     Activity Tolerance:   good  Please refer to the flowsheet for vital signs taken during this treatment. After treatment:   [X]   Patient left in no apparent distress sitting up in chair  [ ]   Patient left in no apparent distress in bed  [X]   Call bell left within reach  [X]   Nursing notified  [X]   Caregiver present  [ ]   Bed alarm activated      COMMUNICATION/EDUCATION:   Communication/Collaboration:  [X]   Fall prevention education was provided and the patient/caregiver indicated understanding. [X]   Patient/family have participated as able and agree with findings and recommendations. [ ]   Patient is unable to participate in plan of care at this time.   Findings and recommendations were discussed with: Registered Nurse     Thank you for this referral.  Greg Kingsley, PT

## 2017-03-06 NOTE — PROGRESS NOTES
Medical Progress Note      NAME: Gee Cooper   :  1955  MRM:  [id number]    Date/Time: 3/6/2017  1:44 PM       Assessment / Plan:     Hx of HTN, DM, DVT, CAD s/p CABG, CKD 4, COPD, ILD, presented with dyspnea, COPD, ILD.  Complicated by worsening renal failure. Patient wanted to transfer care to Hospitalist service on      Acute on chronic respiratory failure/hypoxia: likely due to COPD and underlying mild RB-ILD. No evidence for pneumonia. V/Q scan neg for PE but poor lungs/substrate. Echo neg for pericardial effusion. Needs RHC at some point, will ask cardiology to see. Dyspnea out of proportion to lung disease, wonder if diuresis may be necessary soon despite renal dysfunction. Wean oxygen, assess with ambulation. Taper steroids and nebs. Appreciate pulm input     COPD w/ acute exacerbation: cont above therapy. Taper steroids    Obstructive sleep apnea. CPAP qhs     L sided chest pain: has significant cardiac hx and equates to stable angina but may be MSK. ECG and CE negative. Cont pain meds prn. Monitor closely     Chronic diastolic CHF/HTN: monitor volume. Cont coreg, imdur, hydralazine. Holding bumex due to renal failure but worry with 10# wt gain that we may be seeing an acute exacerbation that may potentiation hypoxemia     ARF/CKD 4: unclear etiology. Slowly improving. Nephrology following     DM type 2 w/ renal complications: last U9X 7.3%. BS elevated from steroids. DM educator following. Increased home insulin 75/25 to 30 units tid. Monitor BG closely with dynamic renal function but current level of insulin resistance makes hypoglycemia unlikely. Also start NPH bid for steroids and will need to taper off as well.     LLE DVT: Repeat VD shows resolution but with risk of PAH and ongoing lung parenchyma dysfunction, will cont coumadin, monitor INR.  Pharm dosing           Subjective:     Chief Complaint:  \"I'm feeling a little better\"    ROS:  (bold if positive, if negative)    CoughSOB/DOEChest Pain  Tolerating PT  Tolerating Diet          Objective:       Vitals:          Last 24hrs VS reviewed since prior progress note. Most recent are:    Visit Vitals    /71 (BP 1 Location: Right arm, BP Patient Position: Sitting)    Pulse 78    Temp 98.5 °F (36.9 °C)    Resp 18    Ht 5' 10\" (1.778 m)    Wt 155.4 kg (342 lb 9.5 oz)    SpO2 98%    BMI 49.16 kg/m2     SpO2 Readings from Last 6 Encounters:   03/06/17 98%   02/23/17 96%   02/20/17 97%   02/11/17 97%   02/10/17 99%   01/25/17 98%    O2 Flow Rate (L/min): 2 l/min     No intake or output data in the 24 hours ending 03/06/17 1132       Exam:     Physical Exam:    Gen:  Well-developed, well-nourished, in no acute distress  HEENT:  Pink conjunctivae, PERRL, hearing intact to voice, moist mucous membranes  Neck:  Supple, without masses, thyroid non-tender  Resp:  No accessory muscle use, fine bilateral crackles with minimal wheezing  Card:  No murmurs, normal S1, S2 without thrills, bruits. +1-2 bilateral edema  Abd:  Soft, non-tender, non-distended, normoactive bowel sounds are present  Musc:  No cyanosis or clubbing  Skin:  No rashes, skin turgor is good  Neuro:   Face symmetric, tongue midline, speech fluent,  strength is 5/5 bilaterally and dorsi / plantarflexion is 5/5 bilaterally, follows commands appropriately  Psych:  Good insight, oriented to person, place and time, alert       Telemetry reviewed:   normal sinus rhythm    Medications Reviewed: (see below)    Lab Data Reviewed: (see below)    ______________________________________________________________________    Medications:     Current Facility-Administered Medications   Medication Dose Route Frequency    magnesium sulfate 2 g/50 ml IVPB (premix or compounded)  2 g IntraVENous ONCE    calcitRIOL (ROCALTROL) capsule 0.25 mcg  0.25 mcg Oral DAILY    epoetin charlie (EPOGEN;PROCRIT) injection 10,000 Units  10,000 Units SubCUTAneous Q7D    bumetanide (BUMEX) tablet 1 mg  1 mg Oral BID    warfarin (COUMADIN) tablet 1.5 mg  1.5 mg Oral ONCE    magnesium oxide (MAG-OX) tablet 400 mg  400 mg Oral BID    predniSONE (DELTASONE) tablet 30 mg  30 mg Oral DAILY WITH BREAKFAST    albuterol (PROVENTIL VENTOLIN) nebulizer solution 2.5 mg  2.5 mg Nebulization Q4H RT    insulin lispro protamine/insulin lispro (HUMALOG MIX 75/25) injection 30 Units  30 Units SubCUTAneous TIDPC    nitroglycerin (NITROSTAT) tablet 0.4 mg  0.4 mg SubLINGual PRN    linaclotide (LINZESS) capsule 290 mcg  290 mcg Oral ACB    tiotropium-olodaterol 2.5-2.5 mcg/actuation mist 2 Puff  2 Puff Inhalation ACL    guaiFENesin ER (MUCINEX) tablet 600 mg  600 mg Oral Q12H    benzonatate (TESSALON) capsule 100 mg  100 mg Oral TID PRN    fluticasone (FLONASE) 50 mcg/actuation nasal spray 2 Spray  2 Spray Both Nostrils DAILY    doxycycline (VIBRA-TABS) tablet 100 mg  100 mg Oral Q12H    insulin NPH (NOVOLIN N, HUMULIN N) injection 15 Units  15 Units SubCUTAneous Q12H    ondansetron (ZOFRAN) injection 4 mg  4 mg IntraVENous Q8H PRN    isosorbide mononitrate ER (IMDUR) tablet 120 mg  120 mg Oral DAILY    oxyCODONE-acetaminophen (PERCOCET) 5-325 mg per tablet 1 Tab  1 Tab Oral Q8H PRN    polyethylene glycol (MIRALAX) packet 17 g  17 g Oral DAILY PRN    sodium chloride (NS) flush 5-10 mL  5-10 mL IntraVENous Q8H    sodium chloride (NS) flush 5-10 mL  5-10 mL IntraVENous PRN    insulin lispro (HUMALOG) injection   SubCUTAneous AC&HS    glucose chewable tablet 16 g  4 Tab Oral PRN    dextrose (D50W) injection syrg 12.5-25 g  12.5-25 g IntraVENous PRN    glucagon (GLUCAGEN) injection 1 mg  1 mg IntraMUSCular PRN    docusate sodium (COLACE) capsule 100 mg  100 mg Oral QHS    hydrALAZINE (APRESOLINE) tablet 25 mg  25 mg Oral TID    sucralfate (CARAFATE) tablet 1 g  1 g Oral AC&HS    carvedilol (COREG) tablet 25 mg  25 mg Oral BID WITH MEALS    pantoprazole (PROTONIX) tablet 40 mg  40 mg Oral ACB    aspirin chewable tablet 81 mg  81 mg Oral DAILY    atorvastatin (LIPITOR) tablet 80 mg  80 mg Oral QPM    Warfarin- Pharmacy Dosing   Other Rx Dosing/Monitoring            Lab Review:     Recent Labs      03/06/17   0713  03/05/17   0511  03/04/17   0750   WBC  14.9*  12.7*  12.1*   HGB  8.8*  8.1*  9.0*   HCT  27.7*  30.2*  27.7*   PLT  352  295  333     Recent Labs      03/06/17   0713  03/05/17   0511  03/04/17   0750   NA  142  138  138   K  4.4  4.5  4.5   CL  107  105  104   CO2  25  23  24   GLU  111*  187*  187*   BUN  63*  64*  60*   CREA  3.39*  3.59*  3.63*   CA  8.1*  7.9*  8.1*   MG  1.3*  1.1*  1.1*   PHOS  3.5  3.2  3.3   INR  3.3*   --    --      Lab Results   Component Value Date/Time    Glucose (POC) 162 03/06/2017 11:10 AM    Glucose (POC) 104 03/06/2017 07:26 AM    Glucose (POC) 261 03/05/2017 08:33 PM    Glucose (POC) 344 03/05/2017 05:46 PM    Glucose (POC) 212 03/05/2017 11:13 AM         Total time spent with patient: 28 [address] [facility] discussed with: Patient, Nursing Staff and >50% of time spent in counseling and coordination of care    Discussed:  Care Plan and D/C Planning    Prophylaxis:  Coumadin    Disposition:  Home w/Family           ___________________________________________________    Attending Physician: Jan Nichols DO

## 2017-03-07 LAB
ANION GAP BLD CALC-SCNC: 8 MMOL/L (ref 5–15)
BASOPHILS # BLD AUTO: 0 K/UL (ref 0–0.1)
BASOPHILS # BLD: 0 % (ref 0–1)
BUN SERPL-MCNC: 67 MG/DL (ref 6–20)
BUN/CREAT SERPL: 19 (ref 12–20)
C-ANCA TITR SER IF: NORMAL TITER
CALCIUM SERPL-MCNC: 8.2 MG/DL (ref 8.5–10.1)
CHLORIDE SERPL-SCNC: 108 MMOL/L (ref 97–108)
CO2 SERPL-SCNC: 25 MMOL/L (ref 21–32)
CREAT SERPL-MCNC: 3.54 MG/DL (ref 0.55–1.02)
DIFFERENTIAL METHOD BLD: ABNORMAL
EOSINOPHIL # BLD: 0 K/UL (ref 0–0.4)
EOSINOPHIL NFR BLD: 0 % (ref 0–7)
ERYTHROCYTE [DISTWIDTH] IN BLOOD BY AUTOMATED COUNT: 14.7 % (ref 11.5–14.5)
GLUCOSE BLD STRIP.AUTO-MCNC: 118 MG/DL (ref 65–100)
GLUCOSE BLD STRIP.AUTO-MCNC: 153 MG/DL (ref 65–100)
GLUCOSE BLD STRIP.AUTO-MCNC: 168 MG/DL (ref 65–100)
GLUCOSE BLD STRIP.AUTO-MCNC: 182 MG/DL (ref 65–100)
GLUCOSE SERPL-MCNC: 123 MG/DL (ref 65–100)
HCT VFR BLD AUTO: 28.1 % (ref 35–47)
HGB BLD-MCNC: 8.7 G/DL (ref 11.5–16)
INR PPP: 2.7 (ref 0.9–1.1)
LYMPHOCYTES # BLD AUTO: 23 % (ref 12–49)
LYMPHOCYTES # BLD: 3.9 K/UL (ref 0.8–3.5)
MAGNESIUM SERPL-MCNC: 1.6 MG/DL (ref 1.6–2.4)
MCH RBC QN AUTO: 29.2 PG (ref 26–34)
MCHC RBC AUTO-ENTMCNC: 31 G/DL (ref 30–36.5)
MCV RBC AUTO: 94.3 FL (ref 80–99)
METAMYELOCYTES NFR BLD MANUAL: 5 %
MONOCYTES # BLD: 0.7 K/UL (ref 0–1)
MONOCYTES NFR BLD AUTO: 4 % (ref 5–13)
MYELOCYTES NFR BLD MANUAL: 3 %
MYELOPEROXIDASE AB SER IA-ACNC: <9 U/ML (ref 0–9)
NEUTS BAND NFR BLD MANUAL: 3 % (ref 0–6)
NEUTS SEG # BLD: 11.1 K/UL (ref 1.8–8)
NEUTS SEG NFR BLD AUTO: 62 % (ref 32–75)
P-ANCA ATYPICAL TITR SER IF: NORMAL TITER
P-ANCA TITR SER IF: NORMAL TITER
PHOSPHATE SERPL-MCNC: 3.5 MG/DL (ref 2.6–4.7)
PLATELET # BLD AUTO: 352 K/UL (ref 150–400)
POTASSIUM SERPL-SCNC: 4.9 MMOL/L (ref 3.5–5.1)
PROTEINASE3 AB SER IA-ACNC: <3.5 U/ML (ref 0–3.5)
PROTHROMBIN TIME: 28.6 SEC (ref 9–11.1)
RBC # BLD AUTO: 2.98 M/UL (ref 3.8–5.2)
RBC MORPH BLD: ABNORMAL
SERVICE CMNT-IMP: ABNORMAL
SODIUM SERPL-SCNC: 141 MMOL/L (ref 136–145)
WBC # BLD AUTO: 17.1 K/UL (ref 3.6–11)

## 2017-03-07 PROCEDURE — 36415 COLL VENOUS BLD VENIPUNCTURE: CPT | Performed by: INTERNAL MEDICINE

## 2017-03-07 PROCEDURE — 74011636637 HC RX REV CODE- 636/637: Performed by: NURSE PRACTITIONER

## 2017-03-07 PROCEDURE — 74011250637 HC RX REV CODE- 250/637: Performed by: FAMILY MEDICINE

## 2017-03-07 PROCEDURE — 74011636637 HC RX REV CODE- 636/637: Performed by: INTERNAL MEDICINE

## 2017-03-07 PROCEDURE — 65660000000 HC RM CCU STEPDOWN

## 2017-03-07 PROCEDURE — 85025 COMPLETE CBC W/AUTO DIFF WBC: CPT | Performed by: INTERNAL MEDICINE

## 2017-03-07 PROCEDURE — 94640 AIRWAY INHALATION TREATMENT: CPT

## 2017-03-07 PROCEDURE — 74011000250 HC RX REV CODE- 250: Performed by: NURSE PRACTITIONER

## 2017-03-07 PROCEDURE — 74011250636 HC RX REV CODE- 250/636: Performed by: INTERNAL MEDICINE

## 2017-03-07 PROCEDURE — 74011636637 HC RX REV CODE- 636/637: Performed by: FAMILY MEDICINE

## 2017-03-07 PROCEDURE — 80048 BASIC METABOLIC PNL TOTAL CA: CPT | Performed by: INTERNAL MEDICINE

## 2017-03-07 PROCEDURE — 77010033678 HC OXYGEN DAILY

## 2017-03-07 PROCEDURE — 83735 ASSAY OF MAGNESIUM: CPT | Performed by: INTERNAL MEDICINE

## 2017-03-07 PROCEDURE — 74011250637 HC RX REV CODE- 250/637: Performed by: INTERNAL MEDICINE

## 2017-03-07 PROCEDURE — 85610 PROTHROMBIN TIME: CPT | Performed by: FAMILY MEDICINE

## 2017-03-07 PROCEDURE — A9270 NON-COVERED ITEM OR SERVICE: HCPCS | Performed by: INTERNAL MEDICINE

## 2017-03-07 PROCEDURE — 84100 ASSAY OF PHOSPHORUS: CPT | Performed by: INTERNAL MEDICINE

## 2017-03-07 PROCEDURE — 82962 GLUCOSE BLOOD TEST: CPT

## 2017-03-07 PROCEDURE — 94660 CPAP INITIATION&MGMT: CPT

## 2017-03-07 RX ORDER — ALBUTEROL SULFATE 0.83 MG/ML
2.5 SOLUTION RESPIRATORY (INHALATION)
Status: DISCONTINUED | OUTPATIENT
Start: 2017-03-07 | End: 2017-03-10 | Stop reason: HOSPADM

## 2017-03-07 RX ORDER — BUMETANIDE 1 MG/1
2 TABLET ORAL 2 TIMES DAILY
Status: DISCONTINUED | OUTPATIENT
Start: 2017-03-07 | End: 2017-03-10 | Stop reason: HOSPADM

## 2017-03-07 RX ORDER — WARFARIN 1 MG/1
1.5 TABLET ORAL ONCE
Status: COMPLETED | OUTPATIENT
Start: 2017-03-07 | End: 2017-03-07

## 2017-03-07 RX ADMIN — HUMAN INSULIN 22 UNITS: 100 INJECTION, SUSPENSION SUBCUTANEOUS at 09:00

## 2017-03-07 RX ADMIN — SUCRALFATE 1 G: 1 TABLET ORAL at 21:04

## 2017-03-07 RX ADMIN — Medication 10 ML: at 21:05

## 2017-03-07 RX ADMIN — ONDANSETRON 4 MG: 2 INJECTION INTRAMUSCULAR; INTRAVENOUS at 11:52

## 2017-03-07 RX ADMIN — CALCITRIOL 0.25 MCG: 0.25 CAPSULE, LIQUID FILLED ORAL at 08:42

## 2017-03-07 RX ADMIN — SUCRALFATE 1 G: 1 TABLET ORAL at 06:20

## 2017-03-07 RX ADMIN — ALBUTEROL SULFATE 2.5 MG: 2.5 SOLUTION RESPIRATORY (INHALATION) at 08:09

## 2017-03-07 RX ADMIN — ALBUTEROL SULFATE 2.5 MG: 2.5 SOLUTION RESPIRATORY (INHALATION) at 14:14

## 2017-03-07 RX ADMIN — ALBUTEROL SULFATE 2.5 MG: 2.5 SOLUTION RESPIRATORY (INHALATION) at 04:29

## 2017-03-07 RX ADMIN — MAGNESIUM GLUCONATE 500 MG ORAL TABLET 400 MG: 500 TABLET ORAL at 10:43

## 2017-03-07 RX ADMIN — DOCUSATE SODIUM 100 MG: 100 CAPSULE ORAL at 21:04

## 2017-03-07 RX ADMIN — LINACLOTIDE 290 MCG: 145 CAPSULE, GELATIN COATED ORAL at 06:20

## 2017-03-07 RX ADMIN — MAGNESIUM GLUCONATE 500 MG ORAL TABLET 400 MG: 500 TABLET ORAL at 18:03

## 2017-03-07 RX ADMIN — ONDANSETRON 4 MG: 2 INJECTION INTRAMUSCULAR; INTRAVENOUS at 19:28

## 2017-03-07 RX ADMIN — INSULIN LISPRO 30 UNITS: 100 INJECTION, SUSPENSION SUBCUTANEOUS at 14:05

## 2017-03-07 RX ADMIN — DOXYCYCLINE HYCLATE 100 MG: 100 TABLET, COATED ORAL at 21:04

## 2017-03-07 RX ADMIN — BUMETANIDE 2 MG: 1 TABLET ORAL at 16:21

## 2017-03-07 RX ADMIN — WARFARIN SODIUM 1.5 MG: 1 TABLET ORAL at 18:03

## 2017-03-07 RX ADMIN — HYDRALAZINE HYDROCHLORIDE 25 MG: 25 TABLET, FILM COATED ORAL at 16:21

## 2017-03-07 RX ADMIN — CARVEDILOL 25 MG: 12.5 TABLET, FILM COATED ORAL at 08:43

## 2017-03-07 RX ADMIN — SUCRALFATE 1 G: 1 TABLET ORAL at 11:52

## 2017-03-07 RX ADMIN — ATORVASTATIN CALCIUM 80 MG: 20 TABLET, FILM COATED ORAL at 18:02

## 2017-03-07 RX ADMIN — FLUTICASONE PROPIONATE 2 SPRAY: 50 SPRAY, METERED NASAL at 09:00

## 2017-03-07 RX ADMIN — ASPIRIN 81 MG CHEWABLE TABLET 81 MG: 81 TABLET CHEWABLE at 08:41

## 2017-03-07 RX ADMIN — INSULIN LISPRO 30 UNITS: 100 INJECTION, SUSPENSION SUBCUTANEOUS at 18:04

## 2017-03-07 RX ADMIN — ISOSORBIDE MONONITRATE 120 MG: 30 TABLET ORAL at 08:41

## 2017-03-07 RX ADMIN — HUMAN INSULIN 15 UNITS: 100 INJECTION, SUSPENSION SUBCUTANEOUS at 21:04

## 2017-03-07 RX ADMIN — HYDRALAZINE HYDROCHLORIDE 25 MG: 25 TABLET, FILM COATED ORAL at 21:04

## 2017-03-07 RX ADMIN — HYDRALAZINE HYDROCHLORIDE 25 MG: 25 TABLET, FILM COATED ORAL at 08:42

## 2017-03-07 RX ADMIN — ALBUTEROL SULFATE 2.5 MG: 2.5 SOLUTION RESPIRATORY (INHALATION) at 19:33

## 2017-03-07 RX ADMIN — INSULIN LISPRO 3 UNITS: 100 INJECTION, SOLUTION INTRAVENOUS; SUBCUTANEOUS at 16:22

## 2017-03-07 RX ADMIN — CARVEDILOL 25 MG: 12.5 TABLET, FILM COATED ORAL at 16:21

## 2017-03-07 RX ADMIN — INSULIN LISPRO 30 UNITS: 100 INJECTION, SUSPENSION SUBCUTANEOUS at 08:44

## 2017-03-07 RX ADMIN — BUMETANIDE 2 MG: 1 TABLET ORAL at 11:52

## 2017-03-07 RX ADMIN — GUAIFENESIN 600 MG: 600 TABLET, EXTENDED RELEASE ORAL at 08:43

## 2017-03-07 RX ADMIN — DOXYCYCLINE HYCLATE 100 MG: 100 TABLET, COATED ORAL at 06:20

## 2017-03-07 RX ADMIN — OXYCODONE HYDROCHLORIDE AND ACETAMINOPHEN 1 TABLET: 5; 325 TABLET ORAL at 11:52

## 2017-03-07 RX ADMIN — PREDNISONE 20 MG: 20 TABLET ORAL at 08:43

## 2017-03-07 RX ADMIN — ALBUTEROL SULFATE 2.5 MG: 2.5 SOLUTION RESPIRATORY (INHALATION) at 00:42

## 2017-03-07 RX ADMIN — Medication 10 ML: at 06:00

## 2017-03-07 RX ADMIN — BUMETANIDE 1 MG: 1 TABLET ORAL at 08:43

## 2017-03-07 RX ADMIN — OXYCODONE HYDROCHLORIDE AND ACETAMINOPHEN 1 TABLET: 5; 325 TABLET ORAL at 19:28

## 2017-03-07 RX ADMIN — GUAIFENESIN 600 MG: 600 TABLET, EXTENDED RELEASE ORAL at 21:04

## 2017-03-07 RX ADMIN — SUCRALFATE 1 G: 1 TABLET ORAL at 16:21

## 2017-03-07 RX ADMIN — INSULIN LISPRO 3 UNITS: 100 INJECTION, SOLUTION INTRAVENOUS; SUBCUTANEOUS at 11:53

## 2017-03-07 NOTE — PROGRESS NOTES
Interdisciplinary team rounds were held 3/7/2017 with the following team members:Care Management, Hospice, Nutrition, Palliative Care and Physical Therapy and the primary RN. Plan of care discussed. See clinical pathway and/or care plan for interventions and desired outcomes.     Discharge today

## 2017-03-07 NOTE — PROGRESS NOTES
Spiritual Care Assessment/Progress Notes    Kaela Bailey [id number]  [id number]    1955  63 y.o.  female    Patient Telephone Number: [phone] (home)   Episcopalian Affiliation: No Hoahaoism   Language: English   Extended Emergency Contact Information  Primary Emergency Contact: Adela Alex.   Address: [address] [place] [address] Phone: [phone]  Relation: Spouse  Secondary Emergency Contact: Ashlyn Funez Phone: [phone]  Relation: Spouse   Patient Active Problem List    Diagnosis Date Noted    Acute exacerbation of chronic obstructive pulmonary disease (COPD) ([facility] 03/01/2017    HCAP (healthcare-associated pneumonia) 02/28/2017    CKD (chronic kidney disease) stage 4, GFR 15-29 ml/min ([address] 02/10/2017    Acute and chronic respiratory failure with hypoxia ([address] 02/10/2017    DVT (deep venous thrombosis) ([facility] 02/02/2017    DM (diabetes mellitus), type 2 with renal complications ([facility] 92/28/8043    Vitamin D deficiency 08/09/2013    Angina, class III ([address] 08/09/2013    Normocytic anemia 05/26/2013    DJD (degenerative joint disease) of knee 10/30/2012    Chronic diastolic heart failure ([address] 10/05/2012    HTN (hypertension) 10/01/2012    Morbid obesity 10/01/2012    Esophageal reflux 10/01/2012    Gastroparesis 10/01/2012    Pulmonary HTN ([address] 03/21/2012    Interstitial lung disease ([facility] 02/28/2011    CAD (coronary Artery Disease) Native Artery 02/16/2011    JASEN on CPAP 02/16/2011        Date: 3/7/2017       Level of Episcopalian/Spiritual Activity:  [x]         Involved in monserrat tradition/spiritual practice    []         Not involved in monserrat tradition/spiritual practice  [x]         Spiritually oriented    []         Claims no spiritual orientation    []         seeking spiritual identity  []         Feels alienated from Voodoo practice/tradition  []         Feels angry about Voodoo practice/tradition  [x]         Spirituality/Amish tradition is a resource for coping at this time. []         Not able to assess due to medical condition    Services Provided Today:  []         crisis intervention    []         reading Scriptures  [x]         spiritual assessment    [x]         prayer  [x]         empathic listening/emotional support  []         rites and rituals (cite in comments)  [x]         life review     []         Amish support  []         theological development   []         advocacy  []         ethical dialog     [x]         blessing  []         bereavement support    []         support to family  []         anticipatory grief support   []         help with AMD  []         spiritual guidance    []         meditation      Spiritual Care Needs  [x]         Emotional Support  [x]         Spiritual/Sikhism Care  []         Loss/Adjustment  []         Advocacy/Referral                /Ethics  []         No needs expressed at               this time  []         Other: (note in               comments)  Spiritual Care Plan  []         Follow up visits with               pt/family  []         Provide materials  []         Schedule sacraments  []         Contact Community               Clergy  [x]         Follow up as needed  []         Other: (note in               comments)     Comments:  is visiting for an initial spiritual assessment with pt in room 531. Pt and  were both present at the time. Pt and  are both very involved in their Anglican. Pt talked about her monserrat and processed through her trust that God is with her and guiding her. Pt discussed some of her medical concerns and the back-and-forth she has done over the past month between home and the hospital. He  has been a great asset for her in her coping.  offered spiritual support through active listening, conversation, encouragement and prayer.  Spiritual care will continue to follow as able and as needed.      [phone] Evita Tobin M.Div M.S, [address] 604 available at 1-[facility]([phone])

## 2017-03-07 NOTE — PROGRESS NOTES
03/07/17 0914   Chart and Patient  Assessment   Pulmonary History 3   Surgical History 0   Chest X-ray 1   Respiratory Pattern 0   Mental Status 0   Breath Sounds 2   Cough 0   Level of Activity/Mobility 1   Respiratory Assessment Total Score 7   Date Last Assessed 03/07/17     Neb frequency changed to TID per RT Protocol and to match patient's home regimen.

## 2017-03-07 NOTE — PROGRESS NOTES
Cardiology Progress Note     5th floor     NAME:  Chica Thompson   :   1955   MRN:   [id number]     Assessment/Plan:   1. Diastolic CHF: 25 # weight gain since admission. Diuretic dosing per renal.   2. CAD/ chest pain now resolved. CT chest show heavy coronary calcium and has known history of CAD with stents. Ideally needs cath but will need to wait till renal function is stable. Cont medical mgt: asa, BB, statin   3. CKD: renal following. Cr 3.54  4. Dyspnea multifactorial:  CHF, sedentary, obesity. 5. PNA: doxy. 6. Previous LLE DVT: coumadin. INR 2.7   Cardiology Attending:    Patient personally seen and examined. All the elements of history and examination were personally performed. Assessment and plan was discussed and agree as written above. Yuan Sam MD, [facility]           Subjective:   Chica Thompson is a 63 y.o. female with past history of CAD, Stents, HTN, CKD, Diastolic CHF,   Admitted with increasing SOB and pneumonia. She also complained of left sided, dull aching, moderate, left sided anterior chest pain radiating to the left arm associated with the SOB. No fever, chills, presyncope or syncope. Cardiac ROS: Patient denies any exertional chest pain, palpitations, syncope, orthopnea, or paroxysmal nocturnal dyspnea. Overnight events:  5# weight gain  feels more SOB, bloated   I/O not recorded       Previous Cardiac Eval  1v CAD by cath - PCI OM with SAL  Cath 2004 - patent stent, no new disease  Lexiscan cardiolite - normal perfusion, EF 66%  Cath: 3/19/12: PA 55/30 mean 41, wedge 26, RA 24, EDP 35, LVG 55%, LM normal, LAD normal, LCX - OM1 patent stent, OM2 prox 85% long, % with L to R collaterals      Review of Systems:   No nausea, indigestion, vomiting, cough, sputum. No bleeding. Taking po. Up to chair.            Objective:     Visit Vitals    /80 (BP 1 Location: Right arm, BP Patient Position: At rest)    Pulse 73    Temp 98.1 °F (36.7 °C)    Resp 18    Ht 5' 10\" (1.778 m)    Wt 347 lb 3.6 oz (157.5 kg)    SpO2 96%    BMI 49.82 kg/m2    O2 Flow Rate (L/min): 2 l/min O2 Device: Nasal cannula    Temp (24hrs), Av.3 °F (36.8 °C), Min:97.9 °F (36.6 °C), Max:98.8 °F (37.1 °C)              TELE: SR PVC's     General: AAOx3 cooperative, no acute distress. HEENT: Atraumatic. Pink and moist.  Anicteric sclerae. Neck : Supple, no thyromegaly. Lungs: Dim bases  Heart: Regular rhythm, no murmur, no rubs, no gallops. No JVD. No carotid bruits. Abdomen: Obese, soft, non-distended, non-tender. + Bowel sounds. No bruits. Extremities: + 1 LE edema, Jatinder hose on, no clubbing, no cyanosis. No calf tenderness  Neurologic: Grossly intact. Alert and oriented X 3. No acute neurological distress. Psych: Good insight. Not anxious or agitated. Care Plan discussed with:    Comments   Patient x    Family  x    RN x    Care Manager                    Consultant:  x Renal        Data Review:     No lab exists for component: ITNL   No results for input(s): CPK, CKMB, TROIQ in the last 72 hours.   Recent Labs      17   0713  17   0713  17   0511   NA  141  142  138   K  4.9  4.4  4.5   CL  108  107  105   CO2  25  25  23   BUN  67*  63*  64*   CREA  3.54*  3.39*  3.59*   GLU  123*  111*  187*   PHOS  3.5  3.5  3.2   MG  1.6  1.3*  1.1*   WBC  17.1*  14.9*  12.7*   HGB  8.7*  8.8*  8.1*   HCT  28.1*  27.7*  30.2*   PLT  352  352  295     Recent Labs      17   0715  17   0713   INR  2.7*  3.3*   PTP  28.6*  34.3*       Medications reviewed  Current Facility-Administered Medications   Medication Dose Route Frequency    warfarin (COUMADIN) tablet 1.5 mg  1.5 mg Oral ONCE    albuterol (PROVENTIL VENTOLIN) nebulizer solution 2.5 mg  2.5 mg Nebulization TID RT    calcitRIOL (ROCALTROL) capsule 0.25 mcg  0.25 mcg Oral DAILY    epoetin charlie (EPOGEN;PROCRIT) injection 10,000 Units  10,000 Units SubCUTAneous Q7D    bumetanide (BUMEX) tablet 1 mg  1 mg Oral BID    insulin NPH (NOVOLIN N, HUMULIN N) injection 22 Units  22 Units SubCUTAneous Q12H    predniSONE (DELTASONE) tablet 20 mg  20 mg Oral DAILY WITH BREAKFAST    [START ON 3/10/2017] predniSONE (DELTASONE) tablet 10 mg  10 mg Oral DAILY WITH BREAKFAST    magnesium oxide (MAG-OX) tablet 400 mg  400 mg Oral BID    insulin lispro protamine/insulin lispro (HUMALOG MIX 75/25) injection 30 Units  30 Units SubCUTAneous TIDPC    nitroglycerin (NITROSTAT) tablet 0.4 mg  0.4 mg SubLINGual PRN    linaclotide (LINZESS) capsule 290 mcg  290 mcg Oral ACB    tiotropium-olodaterol 2.5-2.5 mcg/actuation mist 2 Puff  2 Puff Inhalation ACL    guaiFENesin ER (MUCINEX) tablet 600 mg  600 mg Oral Q12H    benzonatate (TESSALON) capsule 100 mg  100 mg Oral TID PRN    fluticasone (FLONASE) 50 mcg/actuation nasal spray 2 Spray  2 Spray Both Nostrils DAILY    doxycycline (VIBRA-TABS) tablet 100 mg  100 mg Oral Q12H    ondansetron (ZOFRAN) injection 4 mg  4 mg IntraVENous Q8H PRN    isosorbide mononitrate ER (IMDUR) tablet 120 mg  120 mg Oral DAILY    oxyCODONE-acetaminophen (PERCOCET) 5-325 mg per tablet 1 Tab  1 Tab Oral Q8H PRN    polyethylene glycol (MIRALAX) packet 17 g  17 g Oral DAILY PRN    sodium chloride (NS) flush 5-10 mL  5-10 mL IntraVENous Q8H    sodium chloride (NS) flush 5-10 mL  5-10 mL IntraVENous PRN    insulin lispro (HUMALOG) injection   SubCUTAneous AC&HS    glucose chewable tablet 16 g  4 Tab Oral PRN    dextrose (D50W) injection syrg 12.5-25 g  12.5-25 g IntraVENous PRN    glucagon (GLUCAGEN) injection 1 mg  1 mg IntraMUSCular PRN    docusate sodium (COLACE) capsule 100 mg  100 mg Oral QHS    hydrALAZINE (APRESOLINE) tablet 25 mg  25 mg Oral TID    sucralfate (CARAFATE) tablet 1 g  1 g Oral AC&HS    carvedilol (COREG) tablet 25 mg  25 mg Oral BID WITH MEALS    pantoprazole (PROTONIX) tablet 40 mg  40 mg Oral ACB    aspirin chewable tablet 81 mg  81 mg Oral DAILY    atorvastatin (LIPITOR) tablet 80 mg  80 mg Oral QPM    Warfarin- Pharmacy Dosing   Other Rx Dosing/Monitoring         Karis Keller NP

## 2017-03-07 NOTE — PROGRESS NOTES
[facility] Pharmacy Dosing Services: 3/7/2017    Consult for Warfarin Dosing by Pharmacy by Dr. Venu Devine provided for this 63 y.o. female , for indication of Venous Thrombosis. Day of Therapy - PTA: 4mg on Mon, Fri, 2mg rest of the week  Dose to achieve an INR goal of 2-3     Order entered for Warfarin 1.5 mg to be given today at 1800. Significant drug interactions: None    Previous dose given 1.5 mg yesterday    PT/INR Lab Results   Component Value Date/Time    INR 2.7 03/07/2017 07:15 AM      Platelets Lab Results   Component Value Date/Time    PLATELET 867 44/81/2961 07:13 AM      H/H Lab Results   Component Value Date/Time    HGB 8.7 03/07/2017 07:13 AM        Pharmacy to follow daily and will provide subsequent Warfarin dosing based on clinical status.   [facility])  Contact information [phone]

## 2017-03-07 NOTE — PROGRESS NOTES
[address] [facility] [place]  YOB: 1955          Assessment & Plan:   1. ROSEANN on CKD 4    - CR worse but fluid balance still high  - increase bumex  - no RRT needed but ew her  - RCN: Cath dw pt if and when needed     CKD due to DM,HTN  ( )  - UA : No hematuria  - elevated PTH : 2 PTH:   Vit D analogye  2. ILD  - Serologies pending but had biopsy proven Fibrosis due to smoking  - Complement not low  3. HTN  - Coreg,Hydralazine  4. DM  - Insulin  5. Res failure  - due to Obesity. ILD,Some volume  - sob might be also due to anemia  6. Anemia  - Iron panel ok  -   Epo       Subjective:   CC:ckd  HPI: Patient seen   ROSEANN on ckd: cr is still high  Wt is up 5 lbs in last 24 hrs despite loops  Stable chronic SOB.     ROS:cp better  No v/d  Current Facility-Administered Medications   Medication Dose Route Frequency    warfarin (COUMADIN) tablet 1.5 mg  1.5 mg Oral ONCE    albuterol (PROVENTIL VENTOLIN) nebulizer solution 2.5 mg  2.5 mg Nebulization TID RT    calcitRIOL (ROCALTROL) capsule 0.25 mcg  0.25 mcg Oral DAILY    epoetin charlie (EPOGEN;PROCRIT) injection 10,000 Units  10,000 Units SubCUTAneous Q7D    bumetanide (BUMEX) tablet 1 mg  1 mg Oral BID    insulin NPH (NOVOLIN N, HUMULIN N) injection 22 Units  22 Units SubCUTAneous Q12H    predniSONE (DELTASONE) tablet 20 mg  20 mg Oral DAILY WITH BREAKFAST    [START ON 3/10/2017] predniSONE (DELTASONE) tablet 10 mg  10 mg Oral DAILY WITH BREAKFAST    magnesium oxide (MAG-OX) tablet 400 mg  400 mg Oral BID    insulin lispro protamine/insulin lispro (HUMALOG MIX 75/25) injection 30 Units  30 Units SubCUTAneous TIDPC    nitroglycerin (NITROSTAT) tablet 0.4 mg  0.4 mg SubLINGual PRN    linaclotide (LINZESS) capsule 290 mcg  290 mcg Oral ACB    tiotropium-olodaterol 2.5-2.5 mcg/actuation mist 2 Puff  2 Puff Inhalation ACL    guaiFENesin ER (MUCINEX) tablet 600 mg  600 mg Oral Q12H    benzonatate (TESSALON) capsule 100 mg  100 mg Oral TID PRN    fluticasone (FLONASE) 50 mcg/actuation nasal spray 2 Spray  2 Spray Both Nostrils DAILY    doxycycline (VIBRA-TABS) tablet 100 mg  100 mg Oral Q12H    ondansetron (ZOFRAN) injection 4 mg  4 mg IntraVENous Q8H PRN    isosorbide mononitrate ER (IMDUR) tablet 120 mg  120 mg Oral DAILY    oxyCODONE-acetaminophen (PERCOCET) 5-325 mg per tablet 1 Tab  1 Tab Oral Q8H PRN    polyethylene glycol (MIRALAX) packet 17 g  17 g Oral DAILY PRN    sodium chloride (NS) flush 5-10 mL  5-10 mL IntraVENous Q8H    sodium chloride (NS) flush 5-10 mL  5-10 mL IntraVENous PRN    insulin lispro (HUMALOG) injection   SubCUTAneous AC&HS    glucose chewable tablet 16 g  4 Tab Oral PRN    dextrose (D50W) injection syrg 12.5-25 g  12.5-25 g IntraVENous PRN    glucagon (GLUCAGEN) injection 1 mg  1 mg IntraMUSCular PRN    docusate sodium (COLACE) capsule 100 mg  100 mg Oral QHS    hydrALAZINE (APRESOLINE) tablet 25 mg  25 mg Oral TID    sucralfate (CARAFATE) tablet 1 g  1 g Oral AC&HS    carvedilol (COREG) tablet 25 mg  25 mg Oral BID WITH MEALS    pantoprazole (PROTONIX) tablet 40 mg  40 mg Oral ACB    aspirin chewable tablet 81 mg  81 mg Oral DAILY    atorvastatin (LIPITOR) tablet 80 mg  80 mg Oral QPM    Warfarin- Pharmacy Dosing   Other Rx Dosing/Monitoring          Objective:     Vitals:  Blood pressure 165/80, pulse 73, temperature 98.1 °F (36.7 °C), resp. rate 18, height 5' 10\" (1.778 m), weight 157.5 kg (347 lb 3.6 oz), SpO2 96 %.   Temp (24hrs), Av.3 °F (36.8 °C), Min:97.9 °F (36.6 °C), Max:98.8 °F (37.1 °C)      Intake and Output:          Physical Exam:                Patient is intubated:  no    Physical Examination:   GENERAL ASSESSMENT: NAD  HEENT:Nontraumatic   CHEST: Crackles  HEART: S1S2  ABDOMEN: Soft,NT,  :Myers: n  EXTREMITY: 1 EDEMA  NEURO:Grossly non focal          ECG/rhythm:    Data Review      No results for input(s): TNIPOC in the last 72 hours. No lab exists for component: ITNL   No results for input(s): CPK, CKMB, TROIQ in the last 72 hours. Recent Labs      03/07/17   0713 03/06/17   0713  03/05/17   0511   NA  141  142  138   K  4.9  4.4  4.5   CL  108  107  105   CO2  25  25  23   BUN  67*  63*  64*   CREA  3.54*  3.39*  3.59*   GLU  123*  111*  187*   PHOS  3.5  3.5  3.2   MG  1.6  1.3*  1.1*   CA  8.2*  8.1*  7.9*   WBC  17.1*  14.9*  12.7*   HGB  8.7*  8.8*  8.1*   HCT  28.1*  27.7*  30.2*   PLT  352  352  295      Recent Labs      03/07/17 0715  03/06/17   0713   INR  2.7*  3.3*   PTP  28.6*  34.3*     Needs: urine analysis, urine sodium, protein and creatinine  Lab Results   Component Value Date/Time    Sodium urine, random 25 11/10/2014 12:40 PM    Creatinine, urine 145.29 03/04/2017 05:01 AM         Discussed with:  Family, pt    : Kaia Issa MD  3/7/2017        [facility]:  [email or web address]  Stacie Rinaldi office:  [address] [place] [address], [address] [place] [address],[address]  [place], [address] [facility]  Phone: [phone]  Fax :     [phone]    [place] office:  [address] [facility], [address] [place] [address]  Phone - [phone]  Fax - [phone]

## 2017-03-08 LAB
ALBUMIN SERPL BCP-MCNC: 3 G/DL (ref 3.5–5)
ALBUMIN/GLOB SERPL: 0.8 {RATIO} (ref 1.1–2.2)
ALP SERPL-CCNC: 72 U/L (ref 45–117)
ALT SERPL-CCNC: 17 U/L (ref 12–78)
ANION GAP BLD CALC-SCNC: 12 MMOL/L (ref 5–15)
AST SERPL W P-5'-P-CCNC: 8 U/L (ref 15–37)
BASOPHILS # BLD AUTO: 0.2 K/UL (ref 0–0.1)
BASOPHILS # BLD: 1 % (ref 0–1)
BILIRUB SERPL-MCNC: 0.4 MG/DL (ref 0.2–1)
BUN SERPL-MCNC: 66 MG/DL (ref 6–20)
BUN/CREAT SERPL: 19 (ref 12–20)
CALCIUM SERPL-MCNC: 8.5 MG/DL (ref 8.5–10.1)
CHLORIDE SERPL-SCNC: 107 MMOL/L (ref 97–108)
CO2 SERPL-SCNC: 21 MMOL/L (ref 21–32)
CREAT SERPL-MCNC: 3.43 MG/DL (ref 0.55–1.02)
EOSINOPHIL # BLD: 0 K/UL (ref 0–0.4)
EOSINOPHIL NFR BLD: 0 % (ref 0–7)
ERYTHROCYTE [DISTWIDTH] IN BLOOD BY AUTOMATED COUNT: 15.2 % (ref 11.5–14.5)
GLOBULIN SER CALC-MCNC: 3.6 G/DL (ref 2–4)
GLUCOSE BLD STRIP.AUTO-MCNC: 108 MG/DL (ref 65–100)
GLUCOSE BLD STRIP.AUTO-MCNC: 217 MG/DL (ref 65–100)
GLUCOSE BLD STRIP.AUTO-MCNC: 265 MG/DL (ref 65–100)
GLUCOSE BLD STRIP.AUTO-MCNC: 64 MG/DL (ref 65–100)
GLUCOSE BLD STRIP.AUTO-MCNC: 83 MG/DL (ref 65–100)
GLUCOSE SERPL-MCNC: 152 MG/DL (ref 65–100)
HCT VFR BLD AUTO: 29.9 % (ref 35–47)
HGB BLD-MCNC: 9.4 G/DL (ref 11.5–16)
INR PPP: 2.3 (ref 0.9–1.1)
LYMPHOCYTES # BLD AUTO: 23 % (ref 12–49)
LYMPHOCYTES # BLD: 4.5 K/UL (ref 0.8–3.5)
MAGNESIUM SERPL-MCNC: 1.5 MG/DL (ref 1.6–2.4)
MCH RBC QN AUTO: 30 PG (ref 26–34)
MCHC RBC AUTO-ENTMCNC: 31.4 G/DL (ref 30–36.5)
MCV RBC AUTO: 95.5 FL (ref 80–99)
MONOCYTES # BLD: 1.6 K/UL (ref 0–1)
MONOCYTES NFR BLD AUTO: 8 % (ref 5–13)
NEUTS SEG # BLD: 13.3 K/UL (ref 1.8–8)
NEUTS SEG NFR BLD AUTO: 68 % (ref 32–75)
NRBC # BLD: 0.14 K/UL (ref 0–0.01)
NRBC BLD-RTO: 0.7 PER 100 WBC
PHOSPHATE SERPL-MCNC: 4 MG/DL (ref 2.6–4.7)
PLATELET # BLD AUTO: 356 K/UL (ref 150–400)
POTASSIUM SERPL-SCNC: 4 MMOL/L (ref 3.5–5.1)
PROT SERPL-MCNC: 6.6 G/DL (ref 6.4–8.2)
PROTHROMBIN TIME: 23.5 SEC (ref 9–11.1)
RBC # BLD AUTO: 3.13 M/UL (ref 3.8–5.2)
RBC MORPH BLD: ABNORMAL
SERVICE CMNT-IMP: ABNORMAL
SERVICE CMNT-IMP: NORMAL
SODIUM SERPL-SCNC: 140 MMOL/L (ref 136–145)
WBC # BLD AUTO: 19.6 K/UL (ref 3.6–11)
WBC NRBC COR # BLD: ABNORMAL 10*3/UL

## 2017-03-08 PROCEDURE — 94640 AIRWAY INHALATION TREATMENT: CPT

## 2017-03-08 PROCEDURE — 36415 COLL VENOUS BLD VENIPUNCTURE: CPT | Performed by: FAMILY MEDICINE

## 2017-03-08 PROCEDURE — 74011250637 HC RX REV CODE- 250/637: Performed by: INTERNAL MEDICINE

## 2017-03-08 PROCEDURE — 74011250637 HC RX REV CODE- 250/637: Performed by: FAMILY MEDICINE

## 2017-03-08 PROCEDURE — 85025 COMPLETE CBC W/AUTO DIFF WBC: CPT | Performed by: INTERNAL MEDICINE

## 2017-03-08 PROCEDURE — 84100 ASSAY OF PHOSPHORUS: CPT | Performed by: INTERNAL MEDICINE

## 2017-03-08 PROCEDURE — 74011636637 HC RX REV CODE- 636/637: Performed by: NURSE PRACTITIONER

## 2017-03-08 PROCEDURE — 77030012890

## 2017-03-08 PROCEDURE — 74011000250 HC RX REV CODE- 250: Performed by: NURSE PRACTITIONER

## 2017-03-08 PROCEDURE — 74011636637 HC RX REV CODE- 636/637: Performed by: INTERNAL MEDICINE

## 2017-03-08 PROCEDURE — 77010033678 HC OXYGEN DAILY

## 2017-03-08 PROCEDURE — 82962 GLUCOSE BLOOD TEST: CPT

## 2017-03-08 PROCEDURE — 83735 ASSAY OF MAGNESIUM: CPT | Performed by: INTERNAL MEDICINE

## 2017-03-08 PROCEDURE — 65660000000 HC RM CCU STEPDOWN

## 2017-03-08 PROCEDURE — 74011250636 HC RX REV CODE- 250/636: Performed by: INTERNAL MEDICINE

## 2017-03-08 PROCEDURE — 80053 COMPREHEN METABOLIC PANEL: CPT | Performed by: INTERNAL MEDICINE

## 2017-03-08 PROCEDURE — A9270 NON-COVERED ITEM OR SERVICE: HCPCS | Performed by: INTERNAL MEDICINE

## 2017-03-08 PROCEDURE — 74011636637 HC RX REV CODE- 636/637: Performed by: FAMILY MEDICINE

## 2017-03-08 PROCEDURE — 85610 PROTHROMBIN TIME: CPT | Performed by: FAMILY MEDICINE

## 2017-03-08 RX ORDER — WARFARIN 2 MG/1
2 TABLET ORAL ONCE
Status: COMPLETED | OUTPATIENT
Start: 2017-03-08 | End: 2017-03-08

## 2017-03-08 RX ORDER — METOLAZONE 5 MG/1
5 TABLET ORAL
Status: DISCONTINUED | OUTPATIENT
Start: 2017-03-08 | End: 2017-03-10 | Stop reason: HOSPADM

## 2017-03-08 RX ADMIN — PANTOPRAZOLE SODIUM 40 MG: 40 TABLET, DELAYED RELEASE ORAL at 06:40

## 2017-03-08 RX ADMIN — DOXYCYCLINE HYCLATE 100 MG: 100 TABLET, COATED ORAL at 06:40

## 2017-03-08 RX ADMIN — MAGNESIUM GLUCONATE 500 MG ORAL TABLET 400 MG: 500 TABLET ORAL at 17:04

## 2017-03-08 RX ADMIN — INSULIN LISPRO 7 UNITS: 100 INJECTION, SOLUTION INTRAVENOUS; SUBCUTANEOUS at 17:05

## 2017-03-08 RX ADMIN — SUCRALFATE 1 G: 1 TABLET ORAL at 21:48

## 2017-03-08 RX ADMIN — HYDRALAZINE HYDROCHLORIDE 25 MG: 25 TABLET, FILM COATED ORAL at 17:07

## 2017-03-08 RX ADMIN — DOXYCYCLINE HYCLATE 100 MG: 100 TABLET, COATED ORAL at 20:01

## 2017-03-08 RX ADMIN — FLUTICASONE PROPIONATE 2 SPRAY: 50 SPRAY, METERED NASAL at 10:01

## 2017-03-08 RX ADMIN — CARVEDILOL 25 MG: 12.5 TABLET, FILM COATED ORAL at 08:35

## 2017-03-08 RX ADMIN — GUAIFENESIN 600 MG: 600 TABLET, EXTENDED RELEASE ORAL at 08:35

## 2017-03-08 RX ADMIN — BUMETANIDE 2 MG: 1 TABLET ORAL at 06:41

## 2017-03-08 RX ADMIN — INSULIN LISPRO 2 UNITS: 100 INJECTION, SOLUTION INTRAVENOUS; SUBCUTANEOUS at 21:47

## 2017-03-08 RX ADMIN — SUCRALFATE 1 G: 1 TABLET ORAL at 12:36

## 2017-03-08 RX ADMIN — Medication 10 ML: at 14:00

## 2017-03-08 RX ADMIN — CARVEDILOL 25 MG: 12.5 TABLET, FILM COATED ORAL at 17:04

## 2017-03-08 RX ADMIN — DOCUSATE SODIUM 100 MG: 100 CAPSULE ORAL at 20:01

## 2017-03-08 RX ADMIN — ISOSORBIDE MONONITRATE 120 MG: 30 TABLET ORAL at 08:35

## 2017-03-08 RX ADMIN — Medication 10 ML: at 21:48

## 2017-03-08 RX ADMIN — ALBUTEROL SULFATE 2.5 MG: 2.5 SOLUTION RESPIRATORY (INHALATION) at 20:46

## 2017-03-08 RX ADMIN — LINACLOTIDE 290 MCG: 145 CAPSULE, GELATIN COATED ORAL at 06:40

## 2017-03-08 RX ADMIN — CALCITRIOL 0.25 MCG: 0.25 CAPSULE, LIQUID FILLED ORAL at 08:35

## 2017-03-08 RX ADMIN — PREDNISONE 20 MG: 20 TABLET ORAL at 08:36

## 2017-03-08 RX ADMIN — ASPIRIN 81 MG CHEWABLE TABLET 81 MG: 81 TABLET CHEWABLE at 08:35

## 2017-03-08 RX ADMIN — GUAIFENESIN 600 MG: 600 TABLET, EXTENDED RELEASE ORAL at 20:01

## 2017-03-08 RX ADMIN — MAGNESIUM GLUCONATE 500 MG ORAL TABLET 400 MG: 500 TABLET ORAL at 10:00

## 2017-03-08 RX ADMIN — ALBUTEROL SULFATE 2.5 MG: 2.5 SOLUTION RESPIRATORY (INHALATION) at 13:14

## 2017-03-08 RX ADMIN — HYDRALAZINE HYDROCHLORIDE 25 MG: 25 TABLET, FILM COATED ORAL at 08:36

## 2017-03-08 RX ADMIN — ALBUTEROL SULFATE 2.5 MG: 2.5 SOLUTION RESPIRATORY (INHALATION) at 08:43

## 2017-03-08 RX ADMIN — INSULIN LISPRO 30 UNITS: 100 INJECTION, SUSPENSION SUBCUTANEOUS at 18:32

## 2017-03-08 RX ADMIN — OXYCODONE HYDROCHLORIDE AND ACETAMINOPHEN 1 TABLET: 5; 325 TABLET ORAL at 20:01

## 2017-03-08 RX ADMIN — Medication 10 ML: at 06:43

## 2017-03-08 RX ADMIN — ATORVASTATIN CALCIUM 80 MG: 20 TABLET, FILM COATED ORAL at 17:04

## 2017-03-08 RX ADMIN — BUMETANIDE 2 MG: 1 TABLET ORAL at 17:03

## 2017-03-08 RX ADMIN — ONDANSETRON 4 MG: 2 INJECTION INTRAMUSCULAR; INTRAVENOUS at 20:01

## 2017-03-08 RX ADMIN — SUCRALFATE 1 G: 1 TABLET ORAL at 06:40

## 2017-03-08 RX ADMIN — Medication 10 ML: at 20:04

## 2017-03-08 RX ADMIN — WARFARIN SODIUM 2 MG: 2 TABLET ORAL at 17:04

## 2017-03-08 RX ADMIN — SUCRALFATE 1 G: 1 TABLET ORAL at 18:32

## 2017-03-08 RX ADMIN — METOLAZONE 5 MG: 5 TABLET ORAL at 12:36

## 2017-03-08 RX ADMIN — HYDRALAZINE HYDROCHLORIDE 25 MG: 25 TABLET, FILM COATED ORAL at 21:48

## 2017-03-08 NOTE — PROGRESS NOTES
[address] [facility] [place]  YOB: 1955          Assessment & Plan:   1. ROSEANN on CKD 4    - CR stable  - Bumex BID  - ADD Metolazone  - no RRT needed but Dw her  - RCN: Cath dw pt if and when needed     CKD due to DM,HTN  ( )  - UA : No hematuria  - elevated PTH : 2 PTH:   Calcitriol  2. ILD  - stable  3. HTN  - Coreg,Hydralazine  4. DM  - Insulin  5. Res failure  - due to Obesity. ILD,Some volume  - sob might be also due to anemia  6. Anemia  - Iron panel ok  - Epo       Subjective:   CC:ckd  HPI: Patient seen   ROSEANN on ckd: cr is still high  Wt is not better despite loops  Stable chronic SOB.     ROS:cp better  No v/d  Current Facility-Administered Medications   Medication Dose Route Frequency    albuterol (PROVENTIL VENTOLIN) nebulizer solution 2.5 mg  2.5 mg Nebulization TID RT    bumetanide (BUMEX) tablet 2 mg  2 mg Oral BID    calcitRIOL (ROCALTROL) capsule 0.25 mcg  0.25 mcg Oral DAILY    epoetin charlie (EPOGEN;PROCRIT) injection 10,000 Units  10,000 Units SubCUTAneous Q7D    predniSONE (DELTASONE) tablet 20 mg  20 mg Oral DAILY WITH BREAKFAST    [START ON 3/10/2017] predniSONE (DELTASONE) tablet 10 mg  10 mg Oral DAILY WITH BREAKFAST    magnesium oxide (MAG-OX) tablet 400 mg  400 mg Oral BID    insulin lispro protamine/insulin lispro (HUMALOG MIX 75/25) injection 30 Units  30 Units SubCUTAneous TIDPC    nitroglycerin (NITROSTAT) tablet 0.4 mg  0.4 mg SubLINGual PRN    linaclotide (LINZESS) capsule 290 mcg  290 mcg Oral ACB    tiotropium-olodaterol 2.5-2.5 mcg/actuation mist 2 Puff  2 Puff Inhalation ACL    guaiFENesin ER (MUCINEX) tablet 600 mg  600 mg Oral Q12H    benzonatate (TESSALON) capsule 100 mg  100 mg Oral TID PRN    fluticasone (FLONASE) 50 mcg/actuation nasal spray 2 Spray  2 Spray Both Nostrils DAILY    doxycycline (VIBRA-TABS) tablet 100 mg  100 mg Oral Q12H    ondansetron (ZOFRAN) injection 4 mg  4 mg IntraVENous Q8H PRN    isosorbide mononitrate ER (IMDUR) tablet 120 mg  120 mg Oral DAILY    oxyCODONE-acetaminophen (PERCOCET) 5-325 mg per tablet 1 Tab  1 Tab Oral Q8H PRN    polyethylene glycol (MIRALAX) packet 17 g  17 g Oral DAILY PRN    sodium chloride (NS) flush 5-10 mL  5-10 mL IntraVENous Q8H    sodium chloride (NS) flush 5-10 mL  5-10 mL IntraVENous PRN    insulin lispro (HUMALOG) injection   SubCUTAneous AC&HS    glucose chewable tablet 16 g  4 Tab Oral PRN    dextrose (D50W) injection syrg 12.5-25 g  12.5-25 g IntraVENous PRN    glucagon (GLUCAGEN) injection 1 mg  1 mg IntraMUSCular PRN    docusate sodium (COLACE) capsule 100 mg  100 mg Oral QHS    hydrALAZINE (APRESOLINE) tablet 25 mg  25 mg Oral TID    sucralfate (CARAFATE) tablet 1 g  1 g Oral AC&HS    carvedilol (COREG) tablet 25 mg  25 mg Oral BID WITH MEALS    pantoprazole (PROTONIX) tablet 40 mg  40 mg Oral ACB    aspirin chewable tablet 81 mg  81 mg Oral DAILY    atorvastatin (LIPITOR) tablet 80 mg  80 mg Oral QPM    Warfarin- Pharmacy Dosing   Other Rx Dosing/Monitoring          Objective:     Vitals:  Blood pressure 157/86, pulse 74, temperature 98.4 °F (36.9 °C), resp. rate 18, height 5' 10\" (1.778 m), weight 157.1 kg (346 lb 5.5 oz), SpO2 95 %. Temp (24hrs), Av °F (36.7 °C), Min:97.1 °F (36.2 °C), Max:98.4 °F (36.9 °C)      Intake and Output:      1901 -  0700  In: 2140 [P.O.:2140]  Out: 700 [Urine:700]    Physical Exam:                Patient is intubated:  no    Physical Examination:   GENERAL ASSESSMENT: NAD  HEENT:Nontraumatic   CHEST: Crackles better  HEART: S1S2  ABDOMEN: Soft,NT,  :Myers: n  EXTREMITY: 1 EDEMA  NEURO:Grossly non focal          ECG/rhythm:    Data Review      No results for input(s): TNIPOC in the last 72 hours. No lab exists for component: ITNL   No results for input(s): CPK, CKMB, TROIQ in the last 72 hours.   Recent Labs      17   0852  17   0713  17   0713   NA 140  141  142   K  4.0  4.9  4.4   CL  107  108  107   CO2  21  25  25   BUN  66*  67*  63*   CREA  3.43*  3.54*  3.39*   GLU  152*  123*  111*   PHOS  4.0  3.5  3.5   MG  1.5*  1.6  1.3*   CA  8.5  8.2*  8.1*   ALB  3.0*   --    --    WBC  19.6*  17.1*  14.9*   HGB  9.4*  8.7*  8.8*   HCT  29.9*  28.1*  27.7*   PLT  356  352  352      Recent Labs      03/08/17   0852  03/07/17   0715  03/06/17   0713   INR  2.3*  2.7*  3.3*   PTP  23.5*  28.6*  34.3*     Needs: urine analysis, urine sodium, protein and creatinine  Lab Results   Component Value Date/Time    Sodium urine, random 25 11/10/2014 12:40 PM    Creatinine, urine 145.29 03/04/2017 05:01 AM         Discussed with:  Family, pt    : Keely Syed MD  3/8/2017        [facility]:  [email or web address]  Óscar Hoskins office:  [address] [place] [address], [address] [place] [address] [address]  [address]  Phone: [phone]  Fax :     [phone]    [place] office:  [address] [facility], [address] [place] [address]  Phone - [phone]  Fax - [phone]

## 2017-03-08 NOTE — INTERDISCIPLINARY ROUNDS
Interdisciplinary team rounds were held 3/8/2017 with the following team members:Care Management, Nursing, Nutrition, Physical Therapy and Clinical Coordinator. Plan of care discussed. See clinical pathway and/or care plan for interventions and desired outcomes.     Anticipated discharge:pending

## 2017-03-08 NOTE — PROGRESS NOTES
Bedside and Verbal shift change report given to Christo 2365 (oncoming nurse) by Randolph Friday (offgoing nurse). Report included the following information SBAR, Kardex and Recent Results.

## 2017-03-08 NOTE — PROGRESS NOTES
Ezio Craig [facility] 79  [address] [facility], [address] [place] [address]  [phone]      Medical Progress Note      NAME: Nadiya Dhillon   :  1955  MRM:  [id number]    Date/Time: 3/8/2017          Subjective:     Chief Complaint:  F/u CHF, ROSEANN    Chart/notes/labs/studies reviewed, patient examined at bedside. Feels no better. Feels swollen. Dyspnea. No CP              Objective:       Vitals:          Last 24hrs VS reviewed since prior progress note. Most recent are:    Visit Vitals    /79 (BP 1 Location: Right arm, BP Patient Position: At rest)    Pulse 79    Temp 98.6 °F (37 °C)    Resp 19    Ht 5' 10\" (1.778 m)    Wt 157.1 kg (346 lb 5.5 oz)    SpO2 98%    BMI 49.69 kg/m2     SpO2 Readings from Last 6 Encounters:   17 98%   17 96%   17 97%   17 97%   02/10/17 99%   17 98%    O2 Flow Rate (L/min): 2 l/min       Intake/Output Summary (Last 24 hours) at 17 1802  Last data filed at 17 2329   Gross per 24 hour   Intake             1900 ml   Output              700 ml   Net             1200 ml          Exam:     Physical Exam:    Gen: Well-developed, well-nourished, in no acute distress. Obese. Pleasant  HEENT: [place] conjunctivae, hearing intact to voice, moist mucous membranes  Neck: Supple, without masses  Resp: No accessory muscle use, few crackles without any wheezing. Good air movement  Card: No murmurs, normal S1, S2 without thrills, bruits. +1-2 bilateral edema  Abd: Soft, non-tender, non-distended, normoactive bowel sounds are present  Musc: No cyanosis or clubbing  Skin: No rashes, skin turgor is good  Neuro:  Face symmetric, tongue midline, speech fluent  Psych: Good insight, oriented to person, place and time, alert     Medications Reviewed: (see below)    Lab Data Reviewed: (see below)    ______________________________________________________________________    Medications:     Current Facility-Administered Medications Medication Dose Route Frequency    metOLazone (ZAROXOLYN) tablet 5 mg  5 mg Oral Q MON, WED & FRI    albuterol (PROVENTIL VENTOLIN) nebulizer solution 2.5 mg  2.5 mg Nebulization TID RT    bumetanide (BUMEX) tablet 2 mg  2 mg Oral BID    calcitRIOL (ROCALTROL) capsule 0.25 mcg  0.25 mcg Oral DAILY    epoetin charlie (EPOGEN;PROCRIT) injection 10,000 Units  10,000 Units SubCUTAneous Q7D    predniSONE (DELTASONE) tablet 20 mg  20 mg Oral DAILY WITH BREAKFAST    [START ON 3/10/2017] predniSONE (DELTASONE) tablet 10 mg  10 mg Oral DAILY WITH BREAKFAST    magnesium oxide (MAG-OX) tablet 400 mg  400 mg Oral BID    insulin lispro protamine/insulin lispro (HUMALOG MIX 75/25) injection 30 Units  30 Units SubCUTAneous TIDPC    nitroglycerin (NITROSTAT) tablet 0.4 mg  0.4 mg SubLINGual PRN    linaclotide (LINZESS) capsule 290 mcg  290 mcg Oral ACB    tiotropium-olodaterol 2.5-2.5 mcg/actuation mist 2 Puff  2 Puff Inhalation ACL    guaiFENesin ER (MUCINEX) tablet 600 mg  600 mg Oral Q12H    benzonatate (TESSALON) capsule 100 mg  100 mg Oral TID PRN    fluticasone (FLONASE) 50 mcg/actuation nasal spray 2 Spray  2 Spray Both Nostrils DAILY    doxycycline (VIBRA-TABS) tablet 100 mg  100 mg Oral Q12H    ondansetron (ZOFRAN) injection 4 mg  4 mg IntraVENous Q8H PRN    isosorbide mononitrate ER (IMDUR) tablet 120 mg  120 mg Oral DAILY    oxyCODONE-acetaminophen (PERCOCET) 5-325 mg per tablet 1 Tab  1 Tab Oral Q8H PRN    polyethylene glycol (MIRALAX) packet 17 g  17 g Oral DAILY PRN    sodium chloride (NS) flush 5-10 mL  5-10 mL IntraVENous Q8H    sodium chloride (NS) flush 5-10 mL  5-10 mL IntraVENous PRN    insulin lispro (HUMALOG) injection   SubCUTAneous AC&HS    glucose chewable tablet 16 g  4 Tab Oral PRN    dextrose (D50W) injection syrg 12.5-25 g  12.5-25 g IntraVENous PRN    glucagon (GLUCAGEN) injection 1 mg  1 mg IntraMUSCular PRN    docusate sodium (COLACE) capsule 100 mg  100 mg Oral QHS    hydrALAZINE (APRESOLINE) tablet 25 mg  25 mg Oral TID    sucralfate (CARAFATE) tablet 1 g  1 g Oral AC&HS    carvedilol (COREG) tablet 25 mg  25 mg Oral BID WITH MEALS    pantoprazole (PROTONIX) tablet 40 mg  40 mg Oral ACB    aspirin chewable tablet 81 mg  81 mg Oral DAILY    atorvastatin (LIPITOR) tablet 80 mg  80 mg Oral QPM    Warfarin- Pharmacy Dosing   Other Rx Dosing/Monitoring            Lab Review:     Recent Labs      03/08/17 0852 03/07/17 0713 03/06/17 0713   WBC  19.6*  17.1*  14.9*   HGB  9.4*  8.7*  8.8*   HCT  29.9*  28.1*  27.7*   PLT  356  352  352     Recent Labs      03/08/17 0852 03/07/17 0715 03/07/17 0713 03/06/17 0713   NA  140   --   141  142   K  4.0   --   4.9  4.4   CL  107   --   108  107   CO2  21   --   25  25   GLU  152*   --   123*  111*   BUN  66*   --   67*  63*   CREA  3.43*   --   3.54*  3.39*   CA  8.5   --   8.2*  8.1*   MG  1.5*   --   1.6  1.3*   PHOS  4.0   --   3.5  3.5   ALB  3.0*   --    --    --    SGOT  8*   --    --    --    ALT  17   --    --    --    INR  2.3*  2.7*   --   3.3*     No components found for: GLPOC  No results for input(s): PH, PCO2, PO2, HCO3, FIO2 in the last 72 hours. Recent Labs      03/08/17 0852 03/07/17 0715 03/06/17 0713   INR  2.3*  2.7*  3.3*     Lab Results   Component Value Date/Time    Specimen Description: BLOOD 10/14/2013 03:37 AM    Specimen Description: BLOOD 10/11/2013 12:03 AM    Specimen Description: NARES 07/12/2013 04:50 AM     Lab Results   Component Value Date/Time    Culture result: NO GROWTH 6 DAYS 02/28/2017 04:26 PM    Culture result: HEAVY  NORMAL RESPIRATORY WAGNER   02/03/2017 04:10 PM    Culture result: MODERATE  CANDIDA TROPICALIS   02/03/2017 04:10 PM    Culture result:  02/03/2017 04:10 PM     CULTURE WILL BE HELD FOR 4 WEEKS. IF THERE IS ADDITIONAL FUNGAL GROWTH, A NEW REPORT WILL FOLLOW.             Assessment / Plan:     62 yo AAF w/ hx of HTN, DM, DVT, CAD s/p CABG, CKD 4, COPD, ILD, presented with dyspnea, COPD, ILD.  Complicated by worsening renal failure. Transfer care from [facility] to Hospitalist service on 03/02      Acute on chronic respiratory failure/hypoxia: likely due to COPD and underlying mild RB-ILD (biopsy-proven smoking-related ILD). No evidence for pneumonia. V/Q scan low probability for PE. Echo neg for pericardial effusion. Needs RHC and may be LHC at some point. Cr currently precludes cath. -- diuresis per nephrology, adding metolazone  -- cont nebs, tapering steroids    ARF/CKD 4: unclear etiology. Cr stable. Nephrology following closely. May be heading towards RRT soon  -- monitor BMP on diuresis    L sided chest pain: has significant cardiac hx. Stable angina vs MSK. ECG and CE negative. -- Cont pain meds PRN and monitor closely  -- Would probably need LHC but precluded by severe renal insufficiency. Cardiology following. May have to be done outpatient or once pt is on dialysis    COPD w/ acute exacerbation: improved, pulm signed off  -- as above     Obstructive sleep apnea  -- CPAP QHS        Chronic diastolic CHF/HTN:  -- diuresis as above. Strict I/Os, daily weight, low Na diet  -- Cont coreg, imdur, hydralazine      DM type 2 w/ renal complications: last H2K 6.3%. BS elevated from steroids. DM educator following.   -- cont  home insulin 75/25 to 30 units tid. -- stopped NPH as we wean off steroids      LLE DVT: Repeat venous dopplers shows resolution.  Low probability for pulmonary embolism on VQ  -- with risk of PAH and ongoing lung parenchyma dysfunction, cont coumadin, monitor INR. 2.3 today        Total time spent with patient: 25 minutes                  Care Plan discussed with: Patient, Nursing Staff and >50% of time spent in counseling and coordination of care    Discussed:  Care Plan and D/C Planning    Prophylaxis:  Coumadin    Disposition:  Home w/Family           ___________________________________________________    Attending Physician: Mildred Vallejo MD

## 2017-03-08 NOTE — PROGRESS NOTES
Bedside and Verbal shift change report given to Pollo Mendez (oncoming nurse) by Myesha Mendez RN (offgoing nurse). Report included the following information SBAR, Kardex, Intake/Output, MAR and Accordion.

## 2017-03-08 NOTE — PROGRESS NOTES
[facility] Dosing Services: 3/8/2017     Consult for Warfarin Dosing by Pharmacy by Dr. Kadie Marcum provided for this 63 y.o. female , for indication of Venous Thrombosis. Day of Therapy - PTA: 4mg on Mon, Fri, 2mg rest of the week  Dose to achieve an INR goal of 2-3     Order entered for Warfarin 2 mg to be given today at 1800 ( INR trending down now so increased dose from 1.5 mg to 2 mg)     Significant drug interactions: None  Previous dose given 1.5 mg yesterday    PT/INR Lab Results   Component Value Date/Time    INR 2.3 03/08/2017 08:52 AM      Platelets Lab Results   Component Value Date/Time    PLATELET 385 39/50/7982 08:52 AM      H/H Lab Results   Component Value Date/Time    HGB 9.4 03/08/2017 08:52 AM        Pharmacy to follow daily and will provide subsequent Warfarin dosing based on clinical status.   [address] [facility])  Contact information [phone]

## 2017-03-08 NOTE — PROGRESS NOTES
Nutrition Assessment:    RECOMMENDATIONS/INTERVENTION(S):   Continue current diet  Start Glucerna (strawberry) BID until po intake of meals >50%  Monitor po intakes, supplement tolerance, BG, lytes, renal functioning    ASSESSMENT:   3/8: Pt seen for LOS. 63 yof admitted for HCAP. Pmhx includes CKD stage IV, DM, gastroparesis, GERD, hiatal hernia. Nephrology & Cardiology following - CAD, CHF (diuresis per Nephro). Pt known to this author, previous CCD diet ed provided. Visited pt this morning. Reports <50% intake of 2 meals/d since admission, breakfast is best meal.  Ed provided on small, frequent nutrient balanced meals for DM & gastroparesis. Agreeable to ONS trial until po intake >/= 75% of rec diet. Constipation, last BM 2/27. Labs/meds reviewed. K+ & Phos WDL, BUN 66, Cr 3.43; Mg+ 1.5; BG 83, 64, 153, 163; A1C 6.6 per MD note. 1+ edema to BLE. *Spoke to MD prior to starting ONS    SUBJECTIVE/OBJECTIVE:     Diet Order: Consistent carb, Renal  % Eaten:  Patient Vitals for the past 72 hrs:   % Diet Eaten   03/05/17 1130 80 %     Pertinent Medications: [x] Reviewed    Chemistries:  Lab Results   Component Value Date/Time    Sodium 140 03/08/2017 08:52 AM    Potassium 4.0 03/08/2017 08:52 AM    Chloride 107 03/08/2017 08:52 AM    CO2 21 03/08/2017 08:52 AM    Anion gap 12 03/08/2017 08:52 AM    Glucose 152 03/08/2017 08:52 AM    BUN 66 03/08/2017 08:52 AM    Creatinine 3.43 03/08/2017 08:52 AM    BUN/Creatinine ratio 19 03/08/2017 08:52 AM    GFR est AA 17 03/08/2017 08:52 AM    GFR est non-AA 14 03/08/2017 08:52 AM    Calcium 8.5 03/08/2017 08:52 AM    AST (SGOT) 8 03/08/2017 08:52 AM    Alk.  phosphatase 72 03/08/2017 08:52 AM    Protein, total 6.6 03/08/2017 08:52 AM    Albumin 3.0 03/08/2017 08:52 AM    Globulin 3.6 03/08/2017 08:52 AM    A-G Ratio 0.8 03/08/2017 08:52 AM    ALT (SGPT) 17 03/08/2017 08:52 AM      Anthropometrics: Height: 5' 10\" (177.8 cm) Weight: 157.1 kg (346 lb 5.5 oz)    IBW (%IBW): 68 kg (150 lb) (230.9 %) UBW (%UBW):   (  %)    BMI: Body mass index is 49.69 kg/(m^2). This BMI is indicative of:   [] Underweight    [] Normal    [] Overweight    []  Obesity    [x]  Extreme Obesity (BMI>40)  Estimated Nutrition Needs (Based on): 2393 Kcals/day (RMR (2226) x 1.3 AF - 500) , 141 g ((109-  0.7-0.9 g/kg x actual body wt) Protein  Carbohydrate: At Least 130 g/day  Fluids: 2400 mL/day or per MD    Last BM: 2/28   [x]Active     []Hyperactive  []Hypoactive       [] Absent   BS  Skin:    [] Intact   [] Incision  [] Breakdown   [] DTI   [] Tears/Excoriation/Abrasion  [x]Edema [] Other:    Wt Readings from Last 30 Encounters:   03/08/17 157.1 kg (346 lb 5.5 oz)   02/23/17 152.2 kg (335 lb 9.6 oz)   02/20/17 158.5 kg (349 lb 6.9 oz)   02/16/17 (!) 161 kg (355 lb)   02/11/17 148.3 kg (327 lb)   02/09/17 (!) 160.6 kg (354 lb 0.9 oz)   01/25/17 148.8 kg (328 lb)   10/14/16 157.5 kg (347 lb 3.2 oz)   09/13/16 154.6 kg (340 lb 12.8 oz)   09/08/16 158.3 kg (349 lb)   08/25/16 149.7 kg (330 lb)   06/24/16 151.5 kg (334 lb)   06/04/16 158.8 kg (350 lb)   03/21/16 158.3 kg (349 lb)   07/01/15 146 kg (321 lb 14 oz)   06/25/15 151 kg (333 lb)   04/24/15 152.4 kg (336 lb)   01/08/15 143.3 kg (316 lb)   11/17/14 147 kg (324 lb)   10/01/14 130.6 kg (288 lb)   05/29/14 128.8 kg (284 lb)   05/23/14 130.6 kg (288 lb)   03/18/14 127 kg (280 lb)   02/18/14 128.8 kg (284 lb)   11/18/13 130.8 kg (288 lb 6.4 oz)   10/15/13 130.1 kg (286 lb 14.4 oz)   10/05/13 123.8 kg (273 lb)   08/09/13 126.6 kg (279 lb)   07/24/13 131.1 kg (289 lb)   07/21/13 131.5 kg (290 lb)      NUTRITION DIAGNOSES:   Problem:  Inadequate oral food/beverage intake     Etiology: related to medical condition - GERD, hiatal hernia, gastroparesis     Signs/Symptoms: as evidenced by reports of poor-fair po intake & appetite       NUTRITION INTERVENTIONS:                General, healthful diet; commercial supplement     GOAL:   PO intake >50% of meals and >75% of supplements in the next 1-3 days    Cultural, Orthodoxy, or Ethnic Dietary Needs: None     LEARNING NEEDS (Diet, Food/Nutrient-Drug Interaction):    [] None Identified   [x] Identified and Education Provided/Documented   [] Identified and Pt declined/was not appropriate      [] Interdisciplinary Care Plan Reviewed/Documented    [x] Discharge Needs:   See d/c note   [] No Nutrition Related Discharge Needs    NUTRITION RISK:   Pt Is At Nutrition Risk  [x]     No Nutrition Risk Identified  []       PT SEEN FOR:    []  MD Consult: []Calorie Count      []Diabetic Diet Education        []Diet Education     []Electrolyte Management     []General Nutrition Management and Supplements     []Management of Tube Feeding     []TPN Recommendations    []  RN Referral:  []MST score >=2     []Enteral/Parenteral Nutrition PTA     []Pregnant: Gestational DM or Multigestation                 [] Pressure Ulcer    []  Low BMI      [x]  Length of Stay       [] Dysphagia Diet         [] Ventilator            Alexi Buchanan, [address] [place] [address]   Pager [phone]

## 2017-03-08 NOTE — PROGRESS NOTES
Ezio Craig [facility] 79  [address] [facility], [address] [place] [address]  [phone]      Medical Progress Note      NAME: Aurora Gavin   :  1955  MRM:  [id number]    Date/Time: 3/7/2017          Subjective:     Chief Complaint:  F/u CHF, ROSEANN    Chart/notes/labs/studies reviewed, patient examined at bedside. Feels no better. Feels swollen. Dyspnea. No CP              Objective:       Vitals:          Last 24hrs VS reviewed since prior progress note. Most recent are:    Visit Vitals    /71 (BP 1 Location: Right arm, BP Patient Position: At rest;Sitting)    Pulse 79    Temp 98.3 °F (36.8 °C)    Resp 16    Ht 5' 10\" (1.778 m)    Wt 157.5 kg (347 lb 3.6 oz)    SpO2 96%    BMI 49.82 kg/m2     SpO2 Readings from Last 6 Encounters:   17 96%   17 96%   17 97%   17 97%   02/10/17 99%   17 98%    O2 Flow Rate (L/min): 2 l/min     Intake/Output Summary (Last 24 hours) at 17  Last data filed at 17   Gross per 24 hour   Intake              740 ml   Output              700 ml   Net               40 ml          Exam:     Physical Exam:    Gen: Well-developed, well-nourished, in no acute distress. Obese. Pleasant  HEENT: [place] conjunctivae, hearing intact to voice, moist mucous membranes  Neck: Supple, without masses  Resp: No accessory muscle use, few crackles without any wheezing. Good air movement  Card: No murmurs, normal S1, S2 without thrills, bruits. +1-2 bilateral edema  Abd: Soft, non-tender, non-distended, normoactive bowel sounds are present  Musc: No cyanosis or clubbing  Skin: No rashes, skin turgor is good  Neuro:  Face symmetric, tongue midline, speech fluent  Psych: Good insight, oriented to person, place and time, alert     Medications Reviewed: (see below)    Lab Data Reviewed: (see below)    ______________________________________________________________________    Medications:     Current Facility-Administered Medications   Medication Dose Route Frequency    albuterol (PROVENTIL VENTOLIN) nebulizer solution 2.5 mg  2.5 mg Nebulization TID RT    bumetanide (BUMEX) tablet 2 mg  2 mg Oral BID    calcitRIOL (ROCALTROL) capsule 0.25 mcg  0.25 mcg Oral DAILY    epoetin charlie (EPOGEN;PROCRIT) injection 10,000 Units  10,000 Units SubCUTAneous Q7D    insulin NPH (NOVOLIN N, HUMULIN N) injection 22 Units  22 Units SubCUTAneous Q12H    predniSONE (DELTASONE) tablet 20 mg  20 mg Oral DAILY WITH BREAKFAST    [START ON 3/10/2017] predniSONE (DELTASONE) tablet 10 mg  10 mg Oral DAILY WITH BREAKFAST    magnesium oxide (MAG-OX) tablet 400 mg  400 mg Oral BID    insulin lispro protamine/insulin lispro (HUMALOG MIX 75/25) injection 30 Units  30 Units SubCUTAneous TIDPC    nitroglycerin (NITROSTAT) tablet 0.4 mg  0.4 mg SubLINGual PRN    linaclotide (LINZESS) capsule 290 mcg  290 mcg Oral ACB    tiotropium-olodaterol 2.5-2.5 mcg/actuation mist 2 Puff  2 Puff Inhalation ACL    guaiFENesin ER (MUCINEX) tablet 600 mg  600 mg Oral Q12H    benzonatate (TESSALON) capsule 100 mg  100 mg Oral TID PRN    fluticasone (FLONASE) 50 mcg/actuation nasal spray 2 Spray  2 Spray Both Nostrils DAILY    doxycycline (VIBRA-TABS) tablet 100 mg  100 mg Oral Q12H    ondansetron (ZOFRAN) injection 4 mg  4 mg IntraVENous Q8H PRN    isosorbide mononitrate ER (IMDUR) tablet 120 mg  120 mg Oral DAILY    oxyCODONE-acetaminophen (PERCOCET) 5-325 mg per tablet 1 Tab  1 Tab Oral Q8H PRN    polyethylene glycol (MIRALAX) packet 17 g  17 g Oral DAILY PRN    sodium chloride (NS) flush 5-10 mL  5-10 mL IntraVENous Q8H    sodium chloride (NS) flush 5-10 mL  5-10 mL IntraVENous PRN    insulin lispro (HUMALOG) injection   SubCUTAneous AC&HS    glucose chewable tablet 16 g  4 Tab Oral PRN    dextrose (D50W) injection syrg 12.5-25 g  12.5-25 g IntraVENous PRN    glucagon (GLUCAGEN) injection 1 mg  1 mg IntraMUSCular PRN    docusate sodium (COLACE) capsule 100 mg  100 mg Oral QHS    hydrALAZINE (APRESOLINE) tablet 25 mg  25 mg Oral TID    sucralfate (CARAFATE) tablet 1 g  1 g Oral AC&HS    carvedilol (COREG) tablet 25 mg  25 mg Oral BID WITH MEALS    pantoprazole (PROTONIX) tablet 40 mg  40 mg Oral ACB    aspirin chewable tablet 81 mg  81 mg Oral DAILY    atorvastatin (LIPITOR) tablet 80 mg  80 mg Oral QPM    Warfarin- Pharmacy Dosing   Other Rx Dosing/Monitoring            Lab Review:     Recent Labs      03/07/17 0713 03/06/17 0713 03/05/17 0511   WBC  17.1*  14.9*  12.7*   HGB  8.7*  8.8*  8.1*   HCT  28.1*  27.7*  30.2*   PLT  352  352  295     Recent Labs      03/07/17 0715 03/07/17 0713 03/06/17 0713 03/05/17 0511   NA   --   141  142  138   K   --   4.9  4.4  4.5   CL   --   108  107  105   CO2   --   25  25  23   GLU   --   123*  111*  187*   BUN   --   67*  63*  64*   CREA   --   3.54*  3.39*  3.59*   CA   --   8.2*  8.1*  7.9*   MG   --   1.6  1.3*  1.1*   PHOS   --   3.5  3.5  3.2   INR  2.7*   --   3.3*   --      No components found for: GLPOC  No results for input(s): PH, PCO2, PO2, HCO3, FIO2 in the last 72 hours. Recent Labs      03/07/17 0715 03/06/17 0713   INR  2.7*  3.3*     Lab Results   Component Value Date/Time    Specimen Description: BLOOD 10/14/2013 03:37 AM    Specimen Description: BLOOD 10/11/2013 12:03 AM    Specimen Description: NARES 07/12/2013 04:50 AM     Lab Results   Component Value Date/Time    Culture result: NO GROWTH 6 DAYS 02/28/2017 04:26 PM    Culture result: HEAVY  NORMAL RESPIRATORY WAGNER   02/03/2017 04:10 PM    Culture result: MODERATE  CANDIDA TROPICALIS   02/03/2017 04:10 PM    Culture result:  02/03/2017 04:10 PM     CULTURE WILL BE HELD FOR 4 WEEKS. IF THERE IS ADDITIONAL FUNGAL GROWTH, A NEW REPORT WILL FOLLOW. Assessment / Plan:     64 yo AAF w/ hx of HTN, DM, DVT, CAD s/p CABG, CKD 4, COPD, ILD, presented with dyspnea, COPD, ILD.  Complicated by worsening renal failure. Transfer care from [facility] to Hospitalist service on 03/02      Acute on chronic respiratory failure/hypoxia: likely due to COPD and underlying mild RB-ILD (biopsy-proven smoking-related ILD). No evidence for pneumonia. V/Q scan low probability for PE. Echo neg for pericardial effusion. Needs RHC and may be LHC at some point. Cr currently precludes cath. -- IV diuresis per nephrology, increasing dose consuelo  -- cont nebs, tapering steroids    COPD w/ acute exacerbation: improved, pulm signed off  -- as above     Obstructive sleep apnea  -- CPAP qhs      L sided chest pain: has significant cardiac hx. Stable angina vs MSK. ECG and CE negative. -- Cont pain meds PRN and monitor closely  -- Would probably need LHC once renal function recovers. Cardiology following      Chronic diastolic CHF/HTN:  -- IV diuresis as above. Strict I/Os, daily weight, low Na diet  -- Cont coreg, imdur, hydralazine     ARF/CKD 4: unclear etiology. Cr stable. Nephrology following closely  -- monitor BMP on Bumex      DM type 2 w/ renal complications: last L3V 3.5%. BS elevated from steroids. DM educator following.   -- Increased home insulin 75/25 to 30 units tid. -- Also started NPH bid for steroids; decrease dose      LLE DVT: Repeat venous dopplers shows resolution. Low probability for pulmonary embolism on VQ  -- with risk of PAH and ongoing lung parenchyma dysfunction, cont coumadin, monitor INR.  Pharm dosing          Total time spent with patient: 35 minutes                  Care Plan discussed with: Patient, Nursing Staff and >50% of time spent in counseling and coordination of care    Discussed:  Care Plan and D/C Planning    Prophylaxis:  Coumadin    Disposition:  Home w/Family           ___________________________________________________    Attending Physician: Miguelina Alanis MD

## 2017-03-08 NOTE — PROGRESS NOTES
Cardiology Progress Note     5th floor     NAME:  Jeffrey Chris   :   1955   MRN:   [id number]     Assessment/Plan:   1. Diastolic CHF: 25 # weight gain since admission. Diuretic dosing per renal. Bumex 2 mg BID. 2. CAD/ chest pain now resolved. CT chest show heavy coronary calcium and has known history of CAD with stents. Ideally needs cath but will need to wait till renal function is stable. Cont medical mgt: asa, BB, statin. I have arranged OP follow up with Dr Jennifer Duarte to reassess as OP. If HD is initiated inpt we can cath while here but pt is hoping to post pone HD as long as she can   3. CKD: renal following. Cr 3.54 3/7.  4. Dyspnea multifactorial:  CHF, sedentary, obesity. 5. PNA: doxy. 6. Previous LLE DVT: coumadin. INR 2.3  Will see prn. Pls call if needed     Cardiology Attending:    Patient personally seen and examined. All the elements of history and examination were personally performed. Assessment and plan was discussed and agree as written above. Ideally she needs cardiac cath to further assess CAD but this would not happen until she is on dialysis given background of stage 4 CKD. Will wait until Nephrology gives green signal on proceeding with cath which can be done as OP. If discharged she will f/u with Dr. Jennifer Duarte. Yuan Sam MD, [facility]             Subjective:   Jeffrey Chris is a 63 y.o. female with past history of CAD, Stents, HTN, CKD, Diastolic CHF,   Admitted with increasing SOB and pneumonia. She also complained of left sided, dull aching, moderate, left sided anterior chest pain radiating to the left arm associated with the SOB. No fever, chills, presyncope or syncope. Cardiac ROS: Patient denies any exertional chest pain, palpitations, syncope, orthopnea, or paroxysmal nocturnal dyspnea.     Overnight events:  No weight recorded today   I/O incomplete  Breathing  A bit better per pt   No new labs       Previous Cardiac Eval  1v CAD by cath - PCI OM with SAL  Cath 2004 - patent stent, no new disease  Lexiscan cardiolite - normal perfusion, EF 66%  Cath: 3/19/12: PA 55/30 mean 41, wedge 26, RA 24, EDP 35, LVG 55%, LM normal, LAD normal, LCX - OM1 patent stent, OM2 prox 85% long, % with L to R collaterals      Review of Systems:   No nausea, indigestion, vomiting, cough, sputum. No bleeding. Taking po. Up to chair. Ambulatory in room. Objective:     Visit Vitals    /74 (BP 1 Location: Right arm, BP Patient Position: At rest)    Pulse 71    Temp 97.5 °F (36.4 °C)    Resp 16    Ht 5' 10\" (1.778 m)    Wt 347 lb 3.6 oz (157.5 kg)    SpO2 98%    BMI 49.82 kg/m2    O2 Flow Rate (L/min): 2 l/min O2 Device: Nasal cannula    Temp (24hrs), Av.9 °F (36.6 °C), Min:97.1 °F (36.2 °C), Max:98.4 °F (36.9 °C)            1901 -  0700  In: 2140 [P.O.:2140]  Out: 700 [Urine:700]  TELE:  PVC's     General: AAOx3 cooperative, no acute distress. HEENT: Atraumatic. Pink and moist.  Anicteric sclerae. Neck : Supple, no thyromegaly. Lungs: Dim bases  Heart: Regular rhythm, no murmur, no rubs, no gallops. No JVD. No carotid bruits. Abdomen: Obese, soft, non-distended, non-tender. + Bowel sounds. No bruits. Extremities: trace LE edema, Jatinder hose on, no clubbing, no cyanosis. No calf tenderness  Neurologic: Grossly intact. Alert and oriented X 3. No acute neurological distress. Psych: Good insight. Not anxious or agitated. Care Plan discussed with:    Comments   Patient x    Family  x    RN x    Care Manager                    Consultant:          Data Review:     No lab exists for component: ITNL   No results for input(s): CPK, CKMB, TROIQ in the last 72 hours.   Recent Labs      17   NA  141  142   K  4.9  4.4   CL  108  107   CO2  25  25   BUN  67*  63*   CREA  3.54*  3.39*   GLU  123*  111*   PHOS  3.5  3.5   MG  1.6  1.3*   WBC  17.1*  14.9*   HGB  8.7*  8.8* HCT  28.1*  27.7*   PLT  352  352     Recent Labs      03/08/17   0852  03/07/17   0715  03/06/17   0713   INR  2.3*  2.7*  3.3*   PTP  23.5*  28.6*  34.3*       Medications reviewed  Current Facility-Administered Medications   Medication Dose Route Frequency    albuterol (PROVENTIL VENTOLIN) nebulizer solution 2.5 mg  2.5 mg Nebulization TID RT    bumetanide (BUMEX) tablet 2 mg  2 mg Oral BID    calcitRIOL (ROCALTROL) capsule 0.25 mcg  0.25 mcg Oral DAILY    epoetin charlie (EPOGEN;PROCRIT) injection 10,000 Units  10,000 Units SubCUTAneous Q7D    predniSONE (DELTASONE) tablet 20 mg  20 mg Oral DAILY WITH BREAKFAST    [START ON 3/10/2017] predniSONE (DELTASONE) tablet 10 mg  10 mg Oral DAILY WITH BREAKFAST    magnesium oxide (MAG-OX) tablet 400 mg  400 mg Oral BID    insulin lispro protamine/insulin lispro (HUMALOG MIX 75/25) injection 30 Units  30 Units SubCUTAneous TIDPC    nitroglycerin (NITROSTAT) tablet 0.4 mg  0.4 mg SubLINGual PRN    linaclotide (LINZESS) capsule 290 mcg  290 mcg Oral ACB    tiotropium-olodaterol 2.5-2.5 mcg/actuation mist 2 Puff  2 Puff Inhalation ACL    guaiFENesin ER (MUCINEX) tablet 600 mg  600 mg Oral Q12H    benzonatate (TESSALON) capsule 100 mg  100 mg Oral TID PRN    fluticasone (FLONASE) 50 mcg/actuation nasal spray 2 Spray  2 Spray Both Nostrils DAILY    doxycycline (VIBRA-TABS) tablet 100 mg  100 mg Oral Q12H    ondansetron (ZOFRAN) injection 4 mg  4 mg IntraVENous Q8H PRN    isosorbide mononitrate ER (IMDUR) tablet 120 mg  120 mg Oral DAILY    oxyCODONE-acetaminophen (PERCOCET) 5-325 mg per tablet 1 Tab  1 Tab Oral Q8H PRN    polyethylene glycol (MIRALAX) packet 17 g  17 g Oral DAILY PRN    sodium chloride (NS) flush 5-10 mL  5-10 mL IntraVENous Q8H    sodium chloride (NS) flush 5-10 mL  5-10 mL IntraVENous PRN    insulin lispro (HUMALOG) injection   SubCUTAneous AC&HS    glucose chewable tablet 16 g  4 Tab Oral PRN    dextrose (D50W) injection syrg 12.5-25 g  12.5-25 g IntraVENous PRN    glucagon (GLUCAGEN) injection 1 mg  1 mg IntraMUSCular PRN    docusate sodium (COLACE) capsule 100 mg  100 mg Oral QHS    hydrALAZINE (APRESOLINE) tablet 25 mg  25 mg Oral TID    sucralfate (CARAFATE) tablet 1 g  1 g Oral AC&HS    carvedilol (COREG) tablet 25 mg  25 mg Oral BID WITH MEALS    pantoprazole (PROTONIX) tablet 40 mg  40 mg Oral ACB    aspirin chewable tablet 81 mg  81 mg Oral DAILY    atorvastatin (LIPITOR) tablet 80 mg  80 mg Oral QPM    Warfarin- Pharmacy Dosing   Other Rx Dosing/Monitoring         Delmy Wright, NP

## 2017-03-09 ENCOUNTER — PATIENT OUTREACH (OUTPATIENT)
Dept: FAMILY MEDICINE CLINIC | Age: 62
End: 2017-03-09

## 2017-03-09 LAB
ANION GAP BLD CALC-SCNC: 13 MMOL/L (ref 5–15)
BASOPHILS # BLD AUTO: 0 K/UL (ref 0–0.1)
BASOPHILS # BLD: 0 % (ref 0–1)
BUN SERPL-MCNC: 75 MG/DL (ref 6–20)
BUN/CREAT SERPL: 21 (ref 12–20)
CALCIUM SERPL-MCNC: 8.7 MG/DL (ref 8.5–10.1)
CHLORIDE SERPL-SCNC: 103 MMOL/L (ref 97–108)
CO2 SERPL-SCNC: 22 MMOL/L (ref 21–32)
CREAT SERPL-MCNC: 3.63 MG/DL (ref 0.55–1.02)
DIFFERENTIAL METHOD BLD: ABNORMAL
EOSINOPHIL # BLD: 0 K/UL (ref 0–0.4)
EOSINOPHIL NFR BLD: 0 % (ref 0–7)
ERYTHROCYTE [DISTWIDTH] IN BLOOD BY AUTOMATED COUNT: 15 % (ref 11.5–14.5)
GLUCOSE BLD STRIP.AUTO-MCNC: 137 MG/DL (ref 65–100)
GLUCOSE BLD STRIP.AUTO-MCNC: 197 MG/DL (ref 65–100)
GLUCOSE BLD STRIP.AUTO-MCNC: 218 MG/DL (ref 65–100)
GLUCOSE BLD STRIP.AUTO-MCNC: 310 MG/DL (ref 65–100)
GLUCOSE SERPL-MCNC: 190 MG/DL (ref 65–100)
HCT VFR BLD AUTO: 29.1 % (ref 35–47)
HGB BLD-MCNC: 8.9 G/DL (ref 11.5–16)
INR PPP: 2.2 (ref 0.9–1.1)
LYMPHOCYTES # BLD AUTO: 29 % (ref 12–49)
LYMPHOCYTES # BLD: 4.1 K/UL (ref 0.8–3.5)
MAGNESIUM SERPL-MCNC: 1.4 MG/DL (ref 1.6–2.4)
MCH RBC QN AUTO: 28.7 PG (ref 26–34)
MCHC RBC AUTO-ENTMCNC: 30.6 G/DL (ref 30–36.5)
MCV RBC AUTO: 93.9 FL (ref 80–99)
MONOCYTES # BLD: 0.9 K/UL (ref 0–1)
MONOCYTES NFR BLD AUTO: 6 % (ref 5–13)
MYELOCYTES NFR BLD MANUAL: 2 %
NEUTS SEG # BLD: 8.9 K/UL (ref 1.8–8)
NEUTS SEG NFR BLD AUTO: 63 % (ref 32–75)
PHOSPHATE SERPL-MCNC: 4.2 MG/DL (ref 2.6–4.7)
PLATELET # BLD AUTO: 363 K/UL (ref 150–400)
POTASSIUM SERPL-SCNC: 4.2 MMOL/L (ref 3.5–5.1)
PROTHROMBIN TIME: 22.7 SEC (ref 9–11.1)
RBC # BLD AUTO: 3.1 M/UL (ref 3.8–5.2)
RBC MORPH BLD: ABNORMAL
SERVICE CMNT-IMP: ABNORMAL
SODIUM SERPL-SCNC: 138 MMOL/L (ref 136–145)
WBC # BLD AUTO: 14.2 K/UL (ref 3.6–11)

## 2017-03-09 PROCEDURE — 74011000250 HC RX REV CODE- 250: Performed by: NURSE PRACTITIONER

## 2017-03-09 PROCEDURE — 74011636637 HC RX REV CODE- 636/637: Performed by: NURSE PRACTITIONER

## 2017-03-09 PROCEDURE — 82962 GLUCOSE BLOOD TEST: CPT

## 2017-03-09 PROCEDURE — 80048 BASIC METABOLIC PNL TOTAL CA: CPT | Performed by: INTERNAL MEDICINE

## 2017-03-09 PROCEDURE — 83735 ASSAY OF MAGNESIUM: CPT | Performed by: INTERNAL MEDICINE

## 2017-03-09 PROCEDURE — 74011250636 HC RX REV CODE- 250/636: Performed by: INTERNAL MEDICINE

## 2017-03-09 PROCEDURE — 94660 CPAP INITIATION&MGMT: CPT

## 2017-03-09 PROCEDURE — 77010033678 HC OXYGEN DAILY

## 2017-03-09 PROCEDURE — A9270 NON-COVERED ITEM OR SERVICE: HCPCS | Performed by: INTERNAL MEDICINE

## 2017-03-09 PROCEDURE — 74011250637 HC RX REV CODE- 250/637: Performed by: INTERNAL MEDICINE

## 2017-03-09 PROCEDURE — 85025 COMPLETE CBC W/AUTO DIFF WBC: CPT | Performed by: INTERNAL MEDICINE

## 2017-03-09 PROCEDURE — 74011250637 HC RX REV CODE- 250/637: Performed by: FAMILY MEDICINE

## 2017-03-09 PROCEDURE — 65660000000 HC RM CCU STEPDOWN

## 2017-03-09 PROCEDURE — 94640 AIRWAY INHALATION TREATMENT: CPT

## 2017-03-09 PROCEDURE — 74011636637 HC RX REV CODE- 636/637: Performed by: INTERNAL MEDICINE

## 2017-03-09 PROCEDURE — 36415 COLL VENOUS BLD VENIPUNCTURE: CPT | Performed by: FAMILY MEDICINE

## 2017-03-09 PROCEDURE — 84100 ASSAY OF PHOSPHORUS: CPT | Performed by: INTERNAL MEDICINE

## 2017-03-09 PROCEDURE — 74011636637 HC RX REV CODE- 636/637: Performed by: FAMILY MEDICINE

## 2017-03-09 PROCEDURE — 85610 PROTHROMBIN TIME: CPT | Performed by: FAMILY MEDICINE

## 2017-03-09 RX ORDER — LANOLIN ALCOHOL/MO/W.PET/CERES
400 CREAM (GRAM) TOPICAL 2 TIMES DAILY
Status: DISCONTINUED | OUTPATIENT
Start: 2017-03-09 | End: 2017-03-09

## 2017-03-09 RX ORDER — MAGNESIUM SULFATE 1 G/100ML
1 INJECTION INTRAVENOUS ONCE
Status: COMPLETED | OUTPATIENT
Start: 2017-03-09 | End: 2017-03-09

## 2017-03-09 RX ORDER — WARFARIN 2 MG/1
2 TABLET ORAL ONCE
Status: COMPLETED | OUTPATIENT
Start: 2017-03-09 | End: 2017-03-09

## 2017-03-09 RX ADMIN — GUAIFENESIN 600 MG: 600 TABLET, EXTENDED RELEASE ORAL at 09:20

## 2017-03-09 RX ADMIN — MAGNESIUM GLUCONATE 500 MG ORAL TABLET 400 MG: 500 TABLET ORAL at 18:01

## 2017-03-09 RX ADMIN — HYDRALAZINE HYDROCHLORIDE 25 MG: 25 TABLET, FILM COATED ORAL at 09:19

## 2017-03-09 RX ADMIN — PANTOPRAZOLE SODIUM 40 MG: 40 TABLET, DELAYED RELEASE ORAL at 06:22

## 2017-03-09 RX ADMIN — OXYCODONE HYDROCHLORIDE AND ACETAMINOPHEN 1 TABLET: 5; 325 TABLET ORAL at 12:55

## 2017-03-09 RX ADMIN — ONDANSETRON 4 MG: 2 INJECTION INTRAMUSCULAR; INTRAVENOUS at 13:15

## 2017-03-09 RX ADMIN — HYDRALAZINE HYDROCHLORIDE 25 MG: 25 TABLET, FILM COATED ORAL at 21:24

## 2017-03-09 RX ADMIN — CARVEDILOL 25 MG: 12.5 TABLET, FILM COATED ORAL at 09:19

## 2017-03-09 RX ADMIN — Medication 10 ML: at 15:06

## 2017-03-09 RX ADMIN — FLUTICASONE PROPIONATE 2 SPRAY: 50 SPRAY, METERED NASAL at 09:20

## 2017-03-09 RX ADMIN — LINACLOTIDE 290 MCG: 145 CAPSULE, GELATIN COATED ORAL at 09:19

## 2017-03-09 RX ADMIN — DOCUSATE SODIUM 100 MG: 100 CAPSULE ORAL at 20:21

## 2017-03-09 RX ADMIN — SUCRALFATE 1 G: 1 TABLET ORAL at 22:40

## 2017-03-09 RX ADMIN — ATORVASTATIN CALCIUM 80 MG: 20 TABLET, FILM COATED ORAL at 18:01

## 2017-03-09 RX ADMIN — SUCRALFATE 1 G: 1 TABLET ORAL at 18:01

## 2017-03-09 RX ADMIN — CARVEDILOL 25 MG: 12.5 TABLET, FILM COATED ORAL at 18:01

## 2017-03-09 RX ADMIN — ASPIRIN 81 MG CHEWABLE TABLET 81 MG: 81 TABLET CHEWABLE at 09:19

## 2017-03-09 RX ADMIN — WARFARIN SODIUM 2 MG: 2 TABLET ORAL at 18:01

## 2017-03-09 RX ADMIN — BUMETANIDE 2 MG: 1 TABLET ORAL at 18:01

## 2017-03-09 RX ADMIN — MAGNESIUM SULFATE HEPTAHYDRATE 1 G: 1 INJECTION, SOLUTION INTRAVENOUS at 14:48

## 2017-03-09 RX ADMIN — ISOSORBIDE MONONITRATE 120 MG: 30 TABLET ORAL at 09:18

## 2017-03-09 RX ADMIN — BUMETANIDE 2 MG: 1 TABLET ORAL at 06:22

## 2017-03-09 RX ADMIN — INSULIN LISPRO 30 UNITS: 100 INJECTION, SUSPENSION SUBCUTANEOUS at 18:01

## 2017-03-09 RX ADMIN — ALBUTEROL SULFATE 2.5 MG: 2.5 SOLUTION RESPIRATORY (INHALATION) at 19:25

## 2017-03-09 RX ADMIN — CALCITRIOL 0.25 MCG: 0.25 CAPSULE, LIQUID FILLED ORAL at 09:18

## 2017-03-09 RX ADMIN — SUCRALFATE 1 G: 1 TABLET ORAL at 12:56

## 2017-03-09 RX ADMIN — POLYETHYLENE GLYCOL 3350 17 G: 17 POWDER, FOR SOLUTION ORAL at 00:20

## 2017-03-09 RX ADMIN — ALBUTEROL SULFATE 2.5 MG: 2.5 SOLUTION RESPIRATORY (INHALATION) at 08:51

## 2017-03-09 RX ADMIN — Medication 10 ML: at 06:23

## 2017-03-09 RX ADMIN — MAGNESIUM GLUCONATE 500 MG ORAL TABLET 400 MG: 500 TABLET ORAL at 09:19

## 2017-03-09 RX ADMIN — DOXYCYCLINE HYCLATE 100 MG: 100 TABLET, COATED ORAL at 20:21

## 2017-03-09 RX ADMIN — SUCRALFATE 1 G: 1 TABLET ORAL at 06:23

## 2017-03-09 RX ADMIN — INSULIN LISPRO 2 UNITS: 100 INJECTION, SOLUTION INTRAVENOUS; SUBCUTANEOUS at 21:25

## 2017-03-09 RX ADMIN — INSULIN LISPRO 30 UNITS: 100 INJECTION, SUSPENSION SUBCUTANEOUS at 14:48

## 2017-03-09 RX ADMIN — ALBUTEROL SULFATE 2.5 MG: 2.5 SOLUTION RESPIRATORY (INHALATION) at 14:50

## 2017-03-09 RX ADMIN — INSULIN LISPRO 10 UNITS: 100 INJECTION, SOLUTION INTRAVENOUS; SUBCUTANEOUS at 18:02

## 2017-03-09 RX ADMIN — GUAIFENESIN 600 MG: 600 TABLET, EXTENDED RELEASE ORAL at 20:21

## 2017-03-09 RX ADMIN — HYDRALAZINE HYDROCHLORIDE 25 MG: 25 TABLET, FILM COATED ORAL at 18:01

## 2017-03-09 RX ADMIN — INSULIN LISPRO 2 UNITS: 100 INJECTION, SOLUTION INTRAVENOUS; SUBCUTANEOUS at 12:55

## 2017-03-09 RX ADMIN — OXYCODONE HYDROCHLORIDE AND ACETAMINOPHEN 1 TABLET: 5; 325 TABLET ORAL at 22:41

## 2017-03-09 RX ADMIN — PREDNISONE 20 MG: 20 TABLET ORAL at 09:19

## 2017-03-09 RX ADMIN — DOXYCYCLINE HYCLATE 100 MG: 100 TABLET, COATED ORAL at 06:23

## 2017-03-09 NOTE — PROGRESS NOTES
[address] [facility] [place]  YOB: 1955          Assessment & Plan:   1. ROSEANN on CKD 4    - CR stable yesterday, none today  - Bumex BID  - Added Metolazone MWF  - no RRT needed but Dw her  - RCN: Cath dw pt if and when needed  - needs wt     CKD due to DM,HTN  ( )  - UA : No hematuria  - elevated PTH : 2 PTH:   Calcitriol  2. ILD  - stable  3. HTN  - Coreg,Hydralazine  4. DM  - Insulin  5. Res failure  - due to Obesity. ILD,Some volume  - sob might be also due to anemia  6. Anemia  - Iron panel ok  - Epo       Subjective:   CC:ckd  HPI: Patient seen   ROSEANN on ckd: cr is pending from today  Wt is not available  Stable chronic SOB.     ROS:cp better  No v/d  Current Facility-Administered Medications   Medication Dose Route Frequency    metOLazone (ZAROXOLYN) tablet 5 mg  5 mg Oral Q MON, WED & FRI    albuterol (PROVENTIL VENTOLIN) nebulizer solution 2.5 mg  2.5 mg Nebulization TID RT    bumetanide (BUMEX) tablet 2 mg  2 mg Oral BID    calcitRIOL (ROCALTROL) capsule 0.25 mcg  0.25 mcg Oral DAILY    epoetin charlie (EPOGEN;PROCRIT) injection 10,000 Units  10,000 Units SubCUTAneous Q7D    [START ON 3/10/2017] predniSONE (DELTASONE) tablet 10 mg  10 mg Oral DAILY WITH BREAKFAST    magnesium oxide (MAG-OX) tablet 400 mg  400 mg Oral BID    insulin lispro protamine/insulin lispro (HUMALOG MIX 75/25) injection 30 Units  30 Units SubCUTAneous TIDPC    nitroglycerin (NITROSTAT) tablet 0.4 mg  0.4 mg SubLINGual PRN    linaclotide (LINZESS) capsule 290 mcg  290 mcg Oral ACB    tiotropium-olodaterol 2.5-2.5 mcg/actuation mist 2 Puff  2 Puff Inhalation ACL    guaiFENesin ER (MUCINEX) tablet 600 mg  600 mg Oral Q12H    benzonatate (TESSALON) capsule 100 mg  100 mg Oral TID PRN    fluticasone (FLONASE) 50 mcg/actuation nasal spray 2 Spray  2 Spray Both Nostrils DAILY    doxycycline (VIBRA-TABS) tablet 100 mg  100 mg Oral Q12H    ondansetron (ZOFRAN) injection 4 mg  4 mg IntraVENous Q8H PRN    isosorbide mononitrate ER (IMDUR) tablet 120 mg  120 mg Oral DAILY    oxyCODONE-acetaminophen (PERCOCET) 5-325 mg per tablet 1 Tab  1 Tab Oral Q8H PRN    polyethylene glycol (MIRALAX) packet 17 g  17 g Oral DAILY PRN    sodium chloride (NS) flush 5-10 mL  5-10 mL IntraVENous Q8H    sodium chloride (NS) flush 5-10 mL  5-10 mL IntraVENous PRN    insulin lispro (HUMALOG) injection   SubCUTAneous AC&HS    glucose chewable tablet 16 g  4 Tab Oral PRN    dextrose (D50W) injection syrg 12.5-25 g  12.5-25 g IntraVENous PRN    glucagon (GLUCAGEN) injection 1 mg  1 mg IntraMUSCular PRN    docusate sodium (COLACE) capsule 100 mg  100 mg Oral QHS    hydrALAZINE (APRESOLINE) tablet 25 mg  25 mg Oral TID    sucralfate (CARAFATE) tablet 1 g  1 g Oral AC&HS    carvedilol (COREG) tablet 25 mg  25 mg Oral BID WITH MEALS    pantoprazole (PROTONIX) tablet 40 mg  40 mg Oral ACB    aspirin chewable tablet 81 mg  81 mg Oral DAILY    atorvastatin (LIPITOR) tablet 80 mg  80 mg Oral QPM    Warfarin- Pharmacy Dosing   Other Rx Dosing/Monitoring          Objective:     Vitals:  Blood pressure 170/87, pulse 84, temperature 97.8 °F (36.6 °C), resp. rate 18, height 5' 10\" (1.778 m), weight 157.6 kg (347 lb 7.1 oz), SpO2 98 %. Temp (24hrs), Av.1 °F (36.7 °C), Min:97.1 °F (36.2 °C), Max:98.6 °F (37 °C)      Intake and Output:      1901 -  0700  In: 1600 [P.O.:1600]  Out: 1700 [Urine:1400]    Physical Exam:                Patient is intubated:  no    Physical Examination:   GENERAL ASSESSMENT: NAD  HEENT:Nontraumatic   CHEST: Crackles better  HEART: S1S2  ABDOMEN: Soft,NT,  :Myers: n  EXTREMITY: 1 EDEMA  NEURO:Grossly non focal          ECG/rhythm:    Data Review      No results for input(s): TNIPOC in the last 72 hours. No lab exists for component: ITNL   No results for input(s): CPK, CKMB, TROIQ in the last 72 hours.   Recent Labs      17 4737  03/07/17   0713   NA  140  141   K  4.0  4.9   CL  107  108   CO2  21  25   BUN  66*  67*   CREA  3.43*  3.54*   GLU  152*  123*   PHOS  4.0  3.5   MG  1.5*  1.6   CA  8.5  8.2*   ALB  3.0*   --    WBC  19.6*  17.1*   HGB  9.4*  8.7*   HCT  29.9*  28.1*   PLT  356  352      Recent Labs      03/09/17   1034  03/08/17   0852  03/07/17   0715   INR  2.2*  2.3*  2.7*   PTP  22.7*  23.5*  28.6*     Needs: urine analysis, urine sodium, protein and creatinine  Lab Results   Component Value Date/Time    Sodium urine, random 25 11/10/2014 12:40 PM    Creatinine, urine 145.29 03/04/2017 05:01 AM         Discussed with:  Family, pt    : Keely Syed MD  3/9/2017        [facility]:  [email or web address]  Óscar Hoskins office:  [address], [address] [place] [address] [address]  [place], [address] [facility]  Phone: [phone]  Fax :     [phone]    [place] office:  [address] [facility], [address] [facility]  Phone - [phone]  Fax - [phone]

## 2017-03-09 NOTE — PROGRESS NOTES
Bedside and Verbal shift change report given to Ervin Pritchett RN (oncoming nurse) by Karuna Sánchez RN (offgoing nurse). Report included the following information SBAR, Kardex, Intake/Output, MAR, Accordion and Recent Results.

## 2017-03-09 NOTE — PROGRESS NOTES
Interdisciplinary team rounds were held 3/9/2017 with the following team members:Care Management, Nursing, Pharmacy, Physical Therapy and the Primary RN. Plan of care discussed. See clinical pathway and/or care plan for interventions and desired outcomes.     Discharge today

## 2017-03-09 NOTE — PROGRESS NOTES
Attempted to get pt morning labs as ordered. Pt is refusing labs at this time. 720 Bedside and Verbal shift change report given to Christo Oscar (oncoming nurse) by Sanjiv Hansen (offgoing nurse). Report included the following information SBAR, Kardex, Intake/Output, MAR, Recent Results and Cardiac Rhythm NSR.

## 2017-03-09 NOTE — DIABETES MGMT
[facility]     Blood Glucose Note     Recommendations/ Comments: Chart reviewed for fluctuating blood glucose. Shine Honeycutt is a 63 y.o. female with a past medical history significant for DM per Dr. Roxana Hogan MD's H&P dated 2017. Fasting glucose today: 152 mg/dL (per AM lab). Required 9 units of correction in the last 24 hours. Humalog Mix 75/25 30 units was held x 3 in the past 24 hours. Additionally, NPH 15 units was stopped yesterday. If appropriate, please consider the followin. Decreasing Humalog 75/25 30 units 3 times a day from 30 units to 25 units 3 times a day. This would add up to a total daily dose of 75 units, which would be consistent with 0.5 units/kg/day (for basal + bolus). Recent Glucose Results:   Lab Results   Component Value Date/Time    GLUCPOC 137 (H) 2017 07:38 AM    GLUCPOC 217 (H) 2017 08:55 PM    GLUCPOC 265 (H) 2017 04:16 PM        Hospital (inpatient) medications:  1. Correction Scale: Lispro (Humalog) Insulin Resistant Sensitivity scale to cover for glucose > 139 mg/dL before meals and for glucose >199 at bedtime      2. Humalog 75/25 30 units 3 times a day with meals    Prior to admission medications: per past medical records  - Novolog Mix 70/30 - on a sliding scale after meals  For glucose <150 - 60 units; will add 20 units for every 50 points above 150. A1C:   Lab Results   Component Value Date/Time    Hemoglobin A1c 6.9 2017 04:13 AM    Hemoglobin A1c 6.6 2017 03:30 AM     Lab Results   Component Value Date/Time    Creatinine 3.43 2017 08:52 AM     Active Orders   Diet    DIET DIABETIC CONSISTENT CARB Regular; 2 GM NA (House Low NA); 70-70-70 (House); AHA-LOW-CHOL FAT        Thank you. Wesley Cordero.  Shabbir Andre, RN, BSN, MPH  Diabetes [address] [place] [address]  [phone]    -For most hospitalized persons with hyperglycemia in the intensive care unit (ICU), a glucose range of 140 to 180 mg/dL is recommended, provided this target can be safely achieved. *  - For general medicine and surgery patients in non-ICU settings, a premeal glucose target <140 mg/dL and a random blood glucose <180 mg/dL are recommended. *    DIEGO Ashley, Misty Avila., Sweta Feliciano., ... & Caren Norman ([id number]). AMERICAN ASSOCIATION OF CLINICAL ENDOCRINOLOGISTS AND AMERICAN COLLEGE OF ENDOCRINOLOGY-CLINICAL PRACTICE GUIDELINES FOR DEVELOPING A DIABETES MELLITUS COMPREHENSIVE CARE PLAN-2015-EXECUTIVE SUMMARY: Complete guidelines are available at https://www. aace. com/publications/guidelines. Endocrine Practice, 21(4), U3513830.

## 2017-03-09 NOTE — PROGRESS NOTES
[facility] Pharm[facility] Dosing Services: 3/9/2017    Consult for Warfarin Dosing by Pharmacy by Dr. Venu Devine provided for this 63 y.o. female , for indication of Venous Thrombosis. Day of Therapy - PTA: 4mg on Mon, Fri, 2mg rest of the week  Dose to achieve an INR goal of 2-3     Order entered for Warfarin 2 mg to be given today at 1800    Significant drug interactions: None    Previous dose given 2 mg yesterday    PT/INR Lab Results   Component Value Date/Time    INR 2.2 03/09/2017 10:34 AM      Platelets Lab Results   Component Value Date/Time    PLATELET 139 16/07/8782 10:34 AM      H/H Lab Results   Component Value Date/Time    HGB 8.9 03/09/2017 10:34 AM        Pharmacy to follow daily and will provide subsequent Warfarin dosing based on clinical status.   [address] [facility])  Contact information [phone]

## 2017-03-09 NOTE — PROGRESS NOTES
3/9/17, Per chart review, patient remains inpatient at [facility] at this time. Will continue to monitor for discharge. See Previous NN/Patient Outreach Documentation:  3/1/17, Care Coordination Note. This writer/NN out of office, 2/28/17, per Cardiology NN/Yajaira Ruano, patient kept [facility]UL[facility] F/U appt, 2/28/17 and is being sent back to ED/further medical evaluation. S/P [facility], 2/16/17 - 2/20/17 related to CHF Exacerbation and S/P 2/2/17 - 2/10/17 at [facility] related to Resp. Failure/Interstitial Lung Disease, DVT, Diastolic HF, PULM HTN and CKD. Per chart review, patient kept F/U appt with Dr Nasima Valle, 2/23/17, next scheduled appt, 3/9/17. Patient also keeping F/U appts with PULM and Cardiology. 2/16/17, Patient in office to establish PCP with Dr Nasima Valle, reports recent [facility] hospitalization and ED visit. Dr Nasima Valle contacted this writer/NN with concerns of 30 pound weight gain since discharge, INR at 5. 4(recent DVT on coumadin), Diabetes, CHF,and CKD, recommends patient return to [facility] for further medical evaluation

## 2017-03-09 NOTE — Clinical Note
Dr Willian Branham, Mrs Curt Flores was on schedule today, however, remains inpatient at [facility], Nephrology and Cardiology following closely, need Cath, however Cr still elevated, possible need for Dialysis.  Thanks, Ariel Guo

## 2017-03-10 VITALS
SYSTOLIC BLOOD PRESSURE: 126 MMHG | WEIGHT: 293 LBS | HEART RATE: 68 BPM | HEIGHT: 70 IN | OXYGEN SATURATION: 98 % | RESPIRATION RATE: 18 BRPM | BODY MASS INDEX: 41.95 KG/M2 | DIASTOLIC BLOOD PRESSURE: 74 MMHG | TEMPERATURE: 98.4 F

## 2017-03-10 LAB
ALBUMIN SERPL BCP-MCNC: 3 G/DL (ref 3.5–5)
ANION GAP BLD CALC-SCNC: 11 MMOL/L (ref 5–15)
BUN SERPL-MCNC: 81 MG/DL (ref 6–20)
BUN/CREAT SERPL: 21 (ref 12–20)
CALCIUM SERPL-MCNC: 8.8 MG/DL (ref 8.5–10.1)
CHLORIDE SERPL-SCNC: 103 MMOL/L (ref 97–108)
CO2 SERPL-SCNC: 25 MMOL/L (ref 21–32)
CREAT SERPL-MCNC: 3.78 MG/DL (ref 0.55–1.02)
GLUCOSE BLD STRIP.AUTO-MCNC: 123 MG/DL (ref 65–100)
GLUCOSE BLD STRIP.AUTO-MCNC: 194 MG/DL (ref 65–100)
GLUCOSE SERPL-MCNC: 108 MG/DL (ref 65–100)
INR PPP: 2 (ref 0.9–1.1)
MAGNESIUM SERPL-MCNC: 1.6 MG/DL (ref 1.6–2.4)
PHOSPHATE SERPL-MCNC: 4.8 MG/DL (ref 2.6–4.7)
POTASSIUM SERPL-SCNC: 4.3 MMOL/L (ref 3.5–5.1)
PROTHROMBIN TIME: 20.3 SEC (ref 9–11.1)
SERVICE CMNT-IMP: ABNORMAL
SERVICE CMNT-IMP: ABNORMAL
SODIUM SERPL-SCNC: 139 MMOL/L (ref 136–145)

## 2017-03-10 PROCEDURE — 74011000250 HC RX REV CODE- 250: Performed by: NURSE PRACTITIONER

## 2017-03-10 PROCEDURE — 74011250637 HC RX REV CODE- 250/637: Performed by: INTERNAL MEDICINE

## 2017-03-10 PROCEDURE — 74011250637 HC RX REV CODE- 250/637: Performed by: FAMILY MEDICINE

## 2017-03-10 PROCEDURE — 74011250636 HC RX REV CODE- 250/636: Performed by: FAMILY MEDICINE

## 2017-03-10 PROCEDURE — 74011250636 HC RX REV CODE- 250/636: Performed by: INTERNAL MEDICINE

## 2017-03-10 PROCEDURE — 74011636637 HC RX REV CODE- 636/637: Performed by: NURSE PRACTITIONER

## 2017-03-10 PROCEDURE — 36415 COLL VENOUS BLD VENIPUNCTURE: CPT | Performed by: FAMILY MEDICINE

## 2017-03-10 PROCEDURE — 83735 ASSAY OF MAGNESIUM: CPT | Performed by: INTERNAL MEDICINE

## 2017-03-10 PROCEDURE — 82962 GLUCOSE BLOOD TEST: CPT

## 2017-03-10 PROCEDURE — 94640 AIRWAY INHALATION TREATMENT: CPT

## 2017-03-10 PROCEDURE — 77010033678 HC OXYGEN DAILY

## 2017-03-10 PROCEDURE — 85610 PROTHROMBIN TIME: CPT | Performed by: FAMILY MEDICINE

## 2017-03-10 PROCEDURE — 80069 RENAL FUNCTION PANEL: CPT | Performed by: INTERNAL MEDICINE

## 2017-03-10 RX ORDER — PREDNISONE 10 MG/1
10 TABLET ORAL DAILY
Qty: 3 TAB | Refills: 0 | Status: SHIPPED | OUTPATIENT
Start: 2017-03-10 | End: 2017-03-13

## 2017-03-10 RX ORDER — LANOLIN ALCOHOL/MO/W.PET/CERES
400 CREAM (GRAM) TOPICAL 2 TIMES DAILY
Qty: 60 TAB | Refills: 0 | Status: SHIPPED | OUTPATIENT
Start: 2017-03-10 | End: 2017-09-15

## 2017-03-10 RX ORDER — POLYETHYLENE GLYCOL 3350 17 G/17G
17 POWDER, FOR SOLUTION ORAL 2 TIMES DAILY
Status: DISCONTINUED | OUTPATIENT
Start: 2017-03-10 | End: 2017-03-10 | Stop reason: HOSPADM

## 2017-03-10 RX ORDER — WARFARIN 2 MG/1
4 TABLET ORAL ONCE
Status: DISCONTINUED | OUTPATIENT
Start: 2017-03-10 | End: 2017-03-10 | Stop reason: HOSPADM

## 2017-03-10 RX ORDER — METOLAZONE 5 MG/1
5 TABLET ORAL
Qty: 15 TAB | Refills: 0 | Status: SHIPPED | OUTPATIENT
Start: 2017-03-10 | End: 2017-04-17 | Stop reason: HOSPADM

## 2017-03-10 RX ADMIN — ASPIRIN 81 MG CHEWABLE TABLET 81 MG: 81 TABLET CHEWABLE at 08:37

## 2017-03-10 RX ADMIN — ISOSORBIDE MONONITRATE 120 MG: 30 TABLET ORAL at 08:36

## 2017-03-10 RX ADMIN — POLYETHYLENE GLYCOL 3350 17 G: 17 POWDER, FOR SOLUTION ORAL at 12:39

## 2017-03-10 RX ADMIN — BUMETANIDE 2 MG: 1 TABLET ORAL at 07:00

## 2017-03-10 RX ADMIN — Medication 10 ML: at 06:23

## 2017-03-10 RX ADMIN — DOXYCYCLINE HYCLATE 100 MG: 100 TABLET, COATED ORAL at 06:23

## 2017-03-10 RX ADMIN — HYDRALAZINE HYDROCHLORIDE 25 MG: 25 TABLET, FILM COATED ORAL at 08:36

## 2017-03-10 RX ADMIN — CALCITRIOL 0.25 MCG: 0.25 CAPSULE, LIQUID FILLED ORAL at 08:36

## 2017-03-10 RX ADMIN — FLUTICASONE PROPIONATE 2 SPRAY: 50 SPRAY, METERED NASAL at 08:39

## 2017-03-10 RX ADMIN — SUCRALFATE 1 G: 1 TABLET ORAL at 07:00

## 2017-03-10 RX ADMIN — INSULIN LISPRO 30 UNITS: 100 INJECTION, SUSPENSION SUBCUTANEOUS at 12:50

## 2017-03-10 RX ADMIN — SUCRALFATE 1 G: 1 TABLET ORAL at 12:59

## 2017-03-10 RX ADMIN — ALBUTEROL SULFATE 2.5 MG: 2.5 SOLUTION RESPIRATORY (INHALATION) at 08:01

## 2017-03-10 RX ADMIN — INSULIN LISPRO 2 UNITS: 100 INJECTION, SOLUTION INTRAVENOUS; SUBCUTANEOUS at 12:39

## 2017-03-10 RX ADMIN — Medication 10 ML: at 13:00

## 2017-03-10 RX ADMIN — LINACLOTIDE 290 MCG: 145 CAPSULE, GELATIN COATED ORAL at 07:00

## 2017-03-10 RX ADMIN — PREDNISONE 10 MG: 5 TABLET ORAL at 08:37

## 2017-03-10 RX ADMIN — GUAIFENESIN 600 MG: 600 TABLET, EXTENDED RELEASE ORAL at 08:36

## 2017-03-10 RX ADMIN — PANTOPRAZOLE SODIUM 40 MG: 40 TABLET, DELAYED RELEASE ORAL at 07:00

## 2017-03-10 RX ADMIN — CARVEDILOL 25 MG: 12.5 TABLET, FILM COATED ORAL at 08:37

## 2017-03-10 RX ADMIN — MAGNESIUM GLUCONATE 500 MG ORAL TABLET 400 MG: 500 TABLET ORAL at 10:09

## 2017-03-10 NOTE — PROGRESS NOTES
Patient IV infiltrated. Patient stated she does not want to have another IV placed since she's a hard stick. MD aware.

## 2017-03-10 NOTE — PROGRESS NOTES
[address] [facility] [place]  YOB: 1955          Assessment & Plan:   1. ROSEANN on CKD 4    - CR slightly worse compared to past due to diuresis  - Bumex 2 BID and  Metolazone 5 mg MWF  - no RRT needed but Dw her  - needs daily wt, wt loss goal ~ 10 lbs  - she needs vascular surgery apt out pt, and AVF  - She needs labs Monday, pt and office aware     CKD due to DM,HTN  ( )  - UA : No hematuria  - elevated PTH : 2 PTH:   Calcitriol  2. ILD  - stable  3. HTN  - Coreg,Hydralazine  4. DM  - Insulin  5. Res failure  - due to Obesity. ILD,Some volume  - sob might be also due to anemia  6. Anemia  - Iron panel ok  - Epo       Subjective:   CC:ckd  HPI: Patient seen   ROSEANN on ckd: cr is 3.7 mg%  Wt is not available  Stable chronic SOB.     ROS:cp better  No v/d  Current Facility-Administered Medications   Medication Dose Route Frequency    warfarin (COUMADIN) tablet 4 mg  4 mg Oral ONCE    polyethylene glycol (MIRALAX) packet 17 g  17 g Oral BID    metOLazone (ZAROXOLYN) tablet 5 mg  5 mg Oral Q MON, WED & FRI    albuterol (PROVENTIL VENTOLIN) nebulizer solution 2.5 mg  2.5 mg Nebulization TID RT    bumetanide (BUMEX) tablet 2 mg  2 mg Oral BID    calcitRIOL (ROCALTROL) capsule 0.25 mcg  0.25 mcg Oral DAILY    epoetin charlie (EPOGEN;PROCRIT) injection 10,000 Units  10,000 Units SubCUTAneous Q7D    predniSONE (DELTASONE) tablet 10 mg  10 mg Oral DAILY WITH BREAKFAST    magnesium oxide (MAG-OX) tablet 400 mg  400 mg Oral BID    insulin lispro protamine/insulin lispro (HUMALOG MIX 75/25) injection 30 Units  30 Units SubCUTAneous TIDPC    nitroglycerin (NITROSTAT) tablet 0.4 mg  0.4 mg SubLINGual PRN    linaclotide (LINZESS) capsule 290 mcg  290 mcg Oral ACB    tiotropium-olodaterol 2.5-2.5 mcg/actuation mist 2 Puff  2 Puff Inhalation ACL    guaiFENesin ER (MUCINEX) tablet 600 mg  600 mg Oral Q12H    benzonatate (TESSALON) capsule 100 mg  100 mg Oral TID PRN    fluticasone (FLONASE) 50 mcg/actuation nasal spray 2 Spray  2 Spray Both Nostrils DAILY    ondansetron (ZOFRAN) injection 4 mg  4 mg IntraVENous Q8H PRN    isosorbide mononitrate ER (IMDUR) tablet 120 mg  120 mg Oral DAILY    oxyCODONE-acetaminophen (PERCOCET) 5-325 mg per tablet 1 Tab  1 Tab Oral Q8H PRN    polyethylene glycol (MIRALAX) packet 17 g  17 g Oral DAILY PRN    sodium chloride (NS) flush 5-10 mL  5-10 mL IntraVENous Q8H    sodium chloride (NS) flush 5-10 mL  5-10 mL IntraVENous PRN    insulin lispro (HUMALOG) injection   SubCUTAneous AC&HS    glucose chewable tablet 16 g  4 Tab Oral PRN    dextrose (D50W) injection syrg 12.5-25 g  12.5-25 g IntraVENous PRN    glucagon (GLUCAGEN) injection 1 mg  1 mg IntraMUSCular PRN    docusate sodium (COLACE) capsule 100 mg  100 mg Oral QHS    hydrALAZINE (APRESOLINE) tablet 25 mg  25 mg Oral TID    sucralfate (CARAFATE) tablet 1 g  1 g Oral AC&HS    carvedilol (COREG) tablet 25 mg  25 mg Oral BID WITH MEALS    pantoprazole (PROTONIX) tablet 40 mg  40 mg Oral ACB    aspirin chewable tablet 81 mg  81 mg Oral DAILY    atorvastatin (LIPITOR) tablet 80 mg  80 mg Oral QPM    Warfarin- Pharmacy Dosing   Other Rx Dosing/Monitoring          Objective:     Vitals:  Blood pressure 126/74, pulse 68, temperature 98.4 °F (36.9 °C), resp. rate 18, height 5' 10\" (1.778 m), weight 157 kg (346 lb 2 oz), SpO2 98 %. Temp (24hrs), Av.1 °F (36.7 °C), Min:97.5 °F (36.4 °C), Max:98.4 °F (36.9 °C)      Intake and Output:      1901 - 03/10 0700  In: -   Out: 3150 [Urine:2850]    Physical Exam:                Patient is intubated:  no    Physical Examination:   GENERAL ASSESSMENT: NAD  HEENT:Nontraumatic   CHEST: Crackles better  HEART: S1S2  ABDOMEN: Soft,NT,  :Myers: n  EXTREMITY: 1 EDEMA  NEURO:Grossly non focal          ECG/rhythm:    Data Review      No results for input(s): TNIPOC in the last 72 hours.     No lab exists for component: ITNL   No results for input(s): CPK, CKMB, TROIQ in the last 72 hours. Recent Labs      03/10/17   0643  03/09/17   1034  03/08/17   0852   NA  139  138  140   K  4.3  4.2  4.0   CL  103  103  107   CO2  25  22  21   BUN  81*  75*  66*   CREA  3.78*  3.63*  3.43*   GLU  108*  190*  152*   PHOS  4.8*  4.2  4.0   MG  1.6  1.4*  1.5*   CA  8.8  8.7  8.5   ALB  3.0*   --   3.0*   WBC   --   14.2*  19.6*   HGB   --   8.9*  9.4*   HCT   --   29.1*  29.9*   PLT   --   363  356      Recent Labs      03/10/17   0643  03/09/17   1034  03/08/17   0852   INR  2.0*  2.2*  2.3*   PTP  20.3*  22.7*  23.5*     Needs: urine analysis, urine sodium, protein and creatinine  Lab Results   Component Value Date/Time    Sodium urine, random 25 11/10/2014 12:40 PM    Creatinine, urine 145.29 03/04/2017 05:01 AM         Discussed with:  Family, pt    : Carla Gil MD  3/10/2017        [facility]:  [email or web address]  Marcia Castillo office:  [address] [place] [address], [address] [place] [address]  [place], [address] [facility]  Phone: [phone]  Fax :     [phone]    [place] office:  [address] [facility], [address] [facility]  Phone - [phone]  Fax - [phone]

## 2017-03-10 NOTE — PROGRESS NOTES
I have reviewed discharge instructions and prescriptions with the patient and spouse. The patient and spouse verbalized understanding.

## 2017-03-10 NOTE — DISCHARGE SUMMARY
Physician Discharge Summary     Patient ID:  Jodie Dodson  [id number]  89 y.o.  1955    Admit date: 2/28/2017    Discharge date: 3/10/2017    Admission Diagnoses: HCAP (healthcare-associated pneumonia)    Principal Discharge Diagnoses:    COPD  ROSEANN on CKD  Volume overload    OTHER PROBLEMS ADDRESSEDS  Principal Diagnosis HCAP (healthcare-associated pneumonia)                                            Principal Problem:    HCAP (healthcare-associated pneumonia) (2/28/2017)    Active Problems:    CAD (coronary Artery Disease) Native Artery (2/16/2011)      Overview: [place] 2004      1v CAD by cath - PCI OM with SAL      Cath 5/2004 - patent stent, no new disease      Lexiscan cardiolite 12/09- normal perfusion, EF 66%      Cath: 3/19/12: PA 55/30 mean 41, wedge 26, RA 24, EDP 35, LVG 55%, LM       normal, LAD normal, LCX - OM1 patent stent, OM2 prox 85% long, %       with L to R collaterals             Interstitial lung disease ([address] (2/28/2011)      Pulmonary HTN ([address] (3/21/2012)      HTN (hypertension) (10/1/2012)      Morbid obesity (10/1/2012)      Chronic diastolic heart failure ([address] (10/5/2012)      DJD (degenerative joint disease) of knee (10/30/2012)      DM (diabetes mellitus), type 2 with renal complications ([address] (4/2/9023)      DVT (deep venous thrombosis) ([facility]) (2/2/2017)      CKD (chronic kidney disease) stage 4, GFR 15-29 ml/min ([facility]) (2/10/2017)      Acute and chronic respiratory failure with hypoxia ([facility]) (2/10/2017)      Acute exacerbation of chronic obstructive pulmonary disease (COPD) ([address] (3/1/2017)       Patient Active Problem List   Diagnosis Code    CAD (coronary Artery Disease) Native Artery I25.10    JASEN on CPAP G47.33    Interstitial lung disease ([address] J84.9    Pulmonary HTN ([address] I27.2    HTN (hypertension) I10    Morbid obesity E66.01    Esophageal reflux K21.9    Gastroparesis K31.84    Chronic diastolic heart failure (HCC) I50.32    DJD (degenerative joint disease) of knee M17.9    Normocytic anemia D64.9    DM (diabetes mellitus), type 2 with renal complications ([facility]) N07.92    Vitamin D deficiency E55.9    Angina, class III ([facility]) I20.9    DVT (deep venous thrombosis) ([facility]) I82.409    CKD (chronic kidney disease) stage 4, GFR 15-29 ml/min ([facility]) N18.4    Acute and chronic respiratory failure with hypoxia ([facility]) J96.21    HCAP (healthcare-associated pneumonia) J18.9    Acute exacerbation of chronic obstructive pulmonary disease (COPD) [facility]) J44.1         Hospital Course:   Ms. Collins Kumari is a 64 yo AAF w/ hx of HTN, DM, DVT, CAD s/p CABG, CKD 4, COPD, ILD, presented with dyspnea, COPD, ILD.  Complicated by worsening renal failure. Transfer care from [facility] to Hospitalist service on 03/02      Acute on chronic respiratory failure/hypoxia: likely due to COPD and underlying mild RB-ILD (biopsy-proven smoking-related ILD). No evidence for pneumonia. V/Q scan low probability for PE. Echo neg for pericardial effusion. Needs RHC and perhaps LHC at some point. Cr currently precludes cath. Diuresis per nephrology, to continue Bumex. Added metolazone to usual regimen. Cont nebs, steroid taper. Close follow up with PCP, pulm     ARF/CKD 4: unclear etiology. Cr stable. Nephrology following. Likely heading towards RRT soon. Monitor BMP on diuresis. Cr up slightly. Discussed with Dr. Luz Elena Taylor today; stable for discharge. Close PCP follow up     L sided chest pain: has significant cardiac hx. Stable angina vs MSK. ECG and CE negative. Monitor closely, no recent symptoms. Would benefit from [facility] but precluded by severe renal insufficiency. Evaluated by cardiology. May have to be done outpatient or once pt is on dialysis     COPD w/ acute exacerbation: improved, pulm signed off. As above. Follow up with Dr. Rossy Upton  Obstructive sleep apnea: CPAP QHS      Chronic diastolic CHF/HTN: Diuresis as above.  Cont coreg, imdur, hydralazine       DM type 2 w/ renal complications: last A1C 6.6%. BS elevated from steroids. DM educator following. Cont insulin 75/25     LLE DVT: Repeat venous dopplers shows resolution. Low probability for pulmonary embolism on VQ. With risk of PAH and ongoing lung parenchyma dysfunction, cont coumadin, monitor INR.  PCP follow up to repeat labs        Pt discharged in improved and stable condition. Procedures performed: see above    Imaging studies: CT chest 3/2  1. The bilateral diffuse lung disease is basically unchanged. There is honeycombing and fibrosis at both lung bases right greater than left. Patchy areas of groundglass opacity throughout the lung fields in a random distribution has not changed when compared with February 2, 2017 no new areas of disease are noted. These findings could be due to postinflammatory fibrosis. 2. Probable pulmonary artery hypertension  3. Severe coronary artery calcification. Greater than expected for patient this age. Clinical correlation is suggested           PCP: Piper Bernal DO    Consults: Cardiology, Pulmonary/Intensive care and Nephrology    Discharge Exam:  Patient Vitals for the past 12 hrs:   Temp Pulse Resp BP SpO2   03/10/17 1048 98.4 °F (36.9 °C) 68 18 126/74 98 %   03/10/17 0801 - - - - 96 %   03/10/17 0700 - 72 - - -   03/10/17 0625 97.5 °F (36.4 °C) 75 16 161/72 95 %       Gen: Well-developed, well-nourished, in no acute distress. Obese. Pleasant.  at bedside  HEENT: Pink conjunctivae, hearing intact to voice, moist mucous membranes  Neck: Supple, without masses  Resp: No accessory muscle use, few crackles without any wheezing. Good air movement  Card: No murmurs, normal S1, S2 without thrills, bruits. +1-2 bilateral edema  Abd: Soft, non-tender, non-distended, normoactive bowel sounds are present  Musc: No cyanosis or clubbing  Skin: No rashes, skin turgor is good  Neuro:  Face symmetric, tongue midline, speech fluent  Psych: Good insight, oriented to person, place and time, alert        Disposition: home    Patient Instructions:   Current Discharge Medication List      START taking these medications    Details   predniSONE (DELTASONE) 10 mg tablet Take 1 Tab by mouth daily for 3 days. Qty: 3 Tab, Refills: 0      magnesium oxide (MAG-OX) 400 mg tablet Take 1 Tab by mouth two (2) times a day. Qty: 60 Tab, Refills: 0      metOLazone (ZAROXOLYN) 5 mg tablet Take 1 Tab by mouth every Monday, Wednesday, Friday. Qty: 15 Tab, Refills: 0         CONTINUE these medications which have CHANGED    Details   linaclotide (LINZESS) 290 mcg cap capsule Take 1 Cap by mouth Daily (before breakfast) for 10 days. Qty: 30 Cap, Refills: 0         CONTINUE these medications which have NOT CHANGED    Details   polyethylene glycol (MIRALAX) 17 gram packet Take 17 g by mouth daily as needed. !! warfarin (COUMADIN) 2 mg tablet Take 4 mg by mouth two (2) days a week. Monday and Friday      !! warfarin (COUMADIN) 2 mg tablet Take 2 mg by mouth five (5) days a week. Tues, Wed, Thurs, Sa, [place]      senna (SENNA) 8.6 mg tablet Take 2 Tabs by mouth nightly. docusate sodium (COLACE) 100 mg capsule Take 100 mg by mouth nightly. insulin aspart protamine/insulin aspart (NOVOLOG MIX 70-30) 100 unit/mL (70-30) injection by SubCUTAneous route. Uses sliding scale      tiotropium-olodaterol (STIOLTO RESPIMAT) 2.5-2.5 mcg/actuation mist Take 2 Puffs by inhalation Daily (before lunch). Uses daily at 11 am   Qty: 1 Inhaler, Refills: 1    Associated Diagnoses: Centrilobular emphysema (HCC)      oxyCODONE-acetaminophen (PERCOCET) 5-325 mg per tablet Take 1 Tab by mouth every eight (8) hours as needed for Pain. Max Daily Amount: 3 Tabs. Qty: 90 Tab, Refills: 0    Associated Diagnoses: Deep vein thrombosis (DVT) of proximal lower extremity, unspecified chronicity, unspecified laterality (HCC)      hydrALAZINE (APRESOLINE) 25 mg tablet Take 1 Tab by mouth three (3) times daily.   Qty: 90 Tab, Refills: 0      bumetanide (BUMEX) 2 mg tablet Take 1 Tab by mouth two (2) times a day. Qty: 60 Tab, Refills: 0      ondansetron (ZOFRAN ODT) 4 mg disintegrating tablet Take 1 Tab by mouth every six (6) hours as needed for Nausea. Qty: 30 Tab, Refills: 0      albuterol-ipratropium (DUONEB) 2.5 mg-0.5 mg/3 ml nebu 3 mL by Nebulization route daily as needed. Takes about 4 days a week      sucralfate (CARAFATE) 1 gram tablet Take 1 g by mouth Before breakfast, lunch, dinner and at bedtime. fluticasone (FLONASE) 50 mcg/actuation nasal spray 2 Sprays by Both Nostrils route nightly as needed. mupirocin (BACTROBAN) 2 % ointment Apply  to affected area two (2) times daily as needed (lesions on feet when needed). Ointment external two times daily to lesions on feet until healed - now using as needed      albuterol (PROVENTIL HFA, VENTOLIN HFA, PROAIR HFA) 90 mcg/actuation inhaler Take 2 Puffs by inhalation every four (4) hours as needed for Wheezing. calcitRIOL (ROCALTROL) 0.25 mcg capsule Take 0.25 mcg by mouth two (2) days a week. Patient takes on Monday and Thursday      isosorbide mononitrate ER (IMDUR) 120 mg CR tablet TAKE 1 TABLET BY MOUTH EVERY DAY  Qty: 30 Tab, Refills: 5      carvedilol (COREG) 25 mg tablet TAKE 1 TABLET BY MOUTH TWICE A DAY  Qty: 180 Tab, Refills: 3      pantoprazole (PROTONIX) 40 mg tablet Take 1 Tab by mouth daily. Indications: GASTROESOPHAGEAL REFLUX  Qty: 90 Tab, Refills: 1      nitroglycerin (NITROSTAT) 0.3 mg SL tablet 1 Tab by SubLINGual route every five (5) minutes as needed for Chest Pain. Qty: 1 Bottle, Refills: 1    Associated Diagnoses: Atherosclerosis of native coronary artery of native heart with stable angina pectoris ([address] Angina, class III ([address]      ergocalciferol (VITAMIN D2) 50,000 unit capsule Take 50,000 Units by mouth every Tuesday and Thursday. aspirin 81 mg tablet Take 81 mg by mouth daily. atorvastatin (LIPITOR) 80 mg tablet Take 80 mg by mouth every evening.          !! - Potential duplicate medications found. Please discuss with provider. No refills needed per pt      Activity: See discharge instructions  Diet: See discharge instructions  Wound Care: See discharge instructions    Follow-up Information     Follow up With Details Comments [address], DO Go in 1 week  [address]  [address] [facility]  [phone]      Sari Collins MD Schedule an appointment as soon as possible for a visit in 2 days for your kidney [facility]  [phone]      Please return patient's own Stiolto inhaler stored in patient specific bin in [address], MD In 2 weeks  [address]      Cherelle Muñoz MD On 4/17/2017 3:40 pm  [address] DO In 2 weeks  [address]  [phone]            I spent 45 minutes on this discharge.     Signed:  Shannon Caro MD  3/10/2017  1:59 PM

## 2017-03-10 NOTE — DISCHARGE INSTRUCTIONS
HOSPITALIST DISCHARGE INSTRUCTIONS  NAME: Gayle Simons   :  1955   MRN:  [id number]     Date/Time:  3/10/2017 1:57 PM    ADMIT DATE: 2017     DISCHARGE DATE: 3/10/2017     PRINCIPAL DISCHARGE DIAGNOSES:  COPD  Worsening chronic kidney disease    MEDICATIONS:  · It is important that you take the medication exactly as they are prescribed. Note the changes and additions to your medications. Be sure you understand these changes before you are discharged today. · Keep your medication in the bottles provided by the pharmacist and keep a list of the medication names, dosages, and times to be taken in your wallet. · Do not take other medications without consulting your doctor. Pain Management: per above medications    What to do at Home    Recommended diet:  Cardiac Diet, Diabetic Diet, Low fat, Low cholesterol and Renal Diet    Recommended activity: Activity as tolerated    If you experience any of the following symptoms then please call your primary care physician or return to the emergency room if you cannot get hold of your doctor:    Fever, chills, severe abdominal pain, nausea, vomiting, diarrhea, change in mentation, falling, bleeding, severe chest pain, shortness of breath, or other severe concerning symptoms that brought you to the hospital in the first place    Follow Up:   Follow-up Information     Follow up With Details Comments [address], DO Go in 1 week  [address] [facility] [address]      Kusum Ash MD Schedule an appointment as soon as possible for a visit in 2 days for your kidney [address] [place] [address]  [phone]      Please return patient's own Stiolto inhaler stored in patient specific bin in [address], MD In 2 weeks  [address] Thamas Dakin, MD On 2017 3:40 pm  [address] [facility],  In 2 weeks  [address]  [phone]              Information obtained by :  I understand that if any problems occur once I am at home I am to contact my physician. I understand and acknowledge receipt of the instructions indicated above. [de-identified] or R.N.'s Signature                                                                  Date/Time                                                                                                                                              Patient or Representative Signature                                                          Date/Time             Pneumonia: Care Instructions  Your Care Instructions    Pneumonia is an infection of the lungs. Most cases are caused by infections from bacteria or viruses. Pneumonia may be mild or very severe. If it is caused by bacteria, you will be treated with antibiotics. It may take a few weeks to a few months to recover fully from pneumonia, depending on how sick you were and whether your overall health is good. Follow-up care is a key part of your treatment and safety. Be sure to make and go to all appointments, and call your doctor if you are having problems. Its also a good idea to know your test results and keep a list of the medicines you take. How can you care for yourself at home? · Take your antibiotics exactly as directed. Do not stop taking the medicine just because you are feeling better. You need to take the full course of antibiotics. · Take your medicines exactly as prescribed. Call your doctor if you think you are having a problem with your medicine. · Get plenty of rest and sleep. You may feel weak and tired for a while, but your energy level will improve with time.   · To prevent dehydration, drink plenty of fluids, enough so that your urine is light yellow or clear like water. Choose water and other caffeine-free clear liquids until you feel better. If you have kidney, heart, or liver disease and have to limit fluids, talk with your doctor before you increase the amount of fluids you drink. · Take care of your cough so you can rest. A cough that brings up mucus from your lungs is common with pneumonia. It is one way your body gets rid of the infection. But if coughing keeps you from resting or causes severe fatigue and chest-wall pain, talk to your doctor. He or she may suggest that you take a medicine to reduce the cough. · Use a vaporizer or humidifier to add moisture to your bedroom. Follow the directions for cleaning the machine. · Do not smoke or allow others to smoke around you. Smoke will make your cough last longer. If you need help quitting, talk to your doctor about stop-smoking programs and medicines. These can increase your chances of quitting for good. · Take an over-the-counter pain medicine, such as acetaminophen (Tylenol), ibuprofen (Advil, Motrin), or naproxen (Aleve). Read and follow all instructions on the label. · Do not take two or more pain medicines at the same time unless the doctor told you to. Many pain medicines have acetaminophen, which is Tylenol. Too much acetaminophen (Tylenol) can be harmful. · If you were given a spirometer to measure how well your lungs are working, use it as instructed. This can help your doctor tell how your recovery is going. · To prevent pneumonia in the future, talk to your doctor about getting a flu vaccine (once a year) and a pneumococcal vaccine (one time only for most people). When should you call for help? Call 911 anytime you think you may need emergency care. For example, call if:  · You have severe trouble breathing.   Call your doctor now or seek immediate medical care if:  · You cough up dark brown or bloody mucus (sputum). · You have new or worse trouble breathing. · You are dizzy or lightheaded, or you feel like you may faint. Watch closely for changes in your health, and be sure to contact your doctor if:  · You have a new or higher fever. · You are coughing more deeply or more often. · You are not getting better after 2 days (48 hours). · You do not get better as expected. Where can you learn more? Go to http://lindsay-kai.info/. Enter [id number] in the search box to learn more about \"Pneumonia: Care Instructions. \"  Current as of: May 23, 2016  Content Version: 11.1  © 2118-6438 [employer]. Care instructions adapted under license by [employer] (which disclaims liability or warranty for this information). If you have questions about a medical condition or this instruction, always ask your healthcare professional. [place] [address] any warranty or liability for your use of this information. HOME DISCHARGE INSTRUCTIONS                  Follow-up tests needed:   - Follow up PFTs outpatient, bmp, INR        Nutrition Recommendations for Discharge:    Continue Oral Nutrition Supplements at discharge:   Glucerna Advance or Glucerna Shake 1-2 times per day  for 30 days unless otherwise directed by your Primary Care Physician. Azra Max RD                                                                                  Avoiding Triggers With Heart Failure: Care Instructions  Your Care Instructions  Triggers are anything that make your heart failure flare up. A flare-up is also called \"sudden heart failure\" or \"acute heart failure. \" When you have a flare-up, fluid builds up in your lungs, and you have problems breathing. You might need to go to the hospital. By watching for changes in your condition and avoiding triggers, you can prevent heart failure flare-ups. Follow-up care is a key part of your treatment and safety.  Be sure to make and go to all appointments, and call your doctor if you are having problems. It's also a good idea to know your test results and keep a list of the medicines you take. How can you care for yourself at home? Watch for changes in your weight and condition  · Weigh yourself without clothing at the same time each day. Record your weight. Call your doctor if you gain 3 pounds or more in 2 to 3 days. A sudden weight gain may mean that your heart failure is getting worse. · Keep a daily record of your symptoms. Write down any changes in how you feel, such as new shortness of breath, cough, or problems eating. Also record if your ankles are more swollen than usual and if you have to urinate in the night more often. Note anything that you ate or did that could have triggered these changes. Limit sodium  Sodium causes your body to hold on to water, making it harder for your heart to pump. People get most of their sodium from processed foods. Fast food and restaurant meals also tend to be very high in sodium. · Your doctor may suggest that you limit sodium to 2,000 milligrams (mg) a day or less. That is less than 1 teaspoon of salt a day, including all the salt you eat in cooking or in packaged foods. · Read food labels on cans and food packages. They tell you how much sodium you get in one serving. Check the serving size. If you eat more than one serving, you are getting more sodium. · Be aware that sodium can come in forms other than salt, including monosodium glutamate (MSG), sodium citrate, and sodium bicarbonate (baking soda). MSG is often added to Asian food. You can sometimes ask for food without MSG or salt. · Slowly reducing salt will help you adjust to the taste. Take the salt shaker off the table. · Flavor your food with garlic, lemon juice, onion, vinegar, herbs, and spices instead of salt.  Do not use soy sauce, steak sauce, onion salt, garlic salt, mustard, or ketchup on your food, unless it is labeled \"low-sodium\" or \"low-salt. \"  · Make your own salad dressings, sauces, and ketchup without adding salt. · Use fresh or frozen ingredients, instead of canned ones, whenever you can. Choose low-sodium canned goods. · Eat less processed food and food from restaurants, including fast food. Exercise as directed  Moderate, regular exercise is very good for your heart. It improves your blood flow and helps control your weight. But too much exercise can stress your heart and cause a heart failure flare-up. · Check with your doctor before you start an exercise program.  · Walking is an easy way to get exercise. Start out slowly. Gradually increase the length and pace of your walk. Swimming, riding a bike, and using a treadmill are also good forms of exercise. · When you exercise, watch for signs that your heart is working too hard. You are pushing yourself too hard if you cannot talk while you are exercising. If you become short of breath or dizzy or have chest pain, stop, sit down, and rest.  · Do not exercise when you do not feel well. Take medicines correctly  · Take your medicines exactly as prescribed. Call your doctor if you think you are having a problem with your medicine. · Make a list of all the medicines you take. Include those prescribed to you by other doctors and any over-the-counter medicines, vitamins, or supplements you take. Take this list with you when you go to any doctor. · Take your medicines at the same time every day. It may help you to post a list of all the medicines you take every day and what time of day you take them. · Make taking your medicine as simple as you can. Plan times to take your medicines when you are doing other things, such as eating a meal or getting ready for bed. This will make it easier to remember to take your medicines. · Get organized. Use helpful tools, such as daily or weekly pill containers. When should you call for help?   Call 911 if you have symptoms of sudden heart failure such as:  · You have severe trouble breathing. · You cough up pink, foamy mucus. · You have a new irregular or rapid heartbeat. Call your doctor now or seek immediate medical care if:  · You have new or increased shortness of breath. · You are dizzy or lightheaded, or you feel like you may faint. · You have sudden weight gain, such as 3 pounds or more in 2 to 3 days. · You have increased swelling in your legs, ankles, or feet. · You are suddenly so tired or weak that you cannot do your usual activities. Watch closely for changes in your health, and be sure to contact your doctor if you develop new symptoms. Where can you learn more? Go to http://lindsay-kai.info/. Enter K577 in the search box to learn more about \"Avoiding Triggers With Heart Failure: Care Instructions. \"  Current as of: April 27, 2016  Content Version: 11.1  © 8022-1471 [employer]. Care instructions adapted under license by [employer] (which disclaims liability or warranty for this information). If you have questions about a medical condition or this instruction, always ask your healthcare professional. [place] [address] any warranty or liability for your use of this information. Chronic Obstructive Pulmonary Disease (COPD): Care Instructions  Your Care Instructions    Chronic obstructive pulmonary disease (COPD) is a general term for a group of lung diseases, including emphysema and chronic bronchitis. People with COPD have decreased airflow in and out of the lungs, which makes it hard to breathe. The airways also can get clogged with thick mucus. Cigarette smoking is a major cause of COPD. Although there is no cure for COPD, you can slow its progress. Following your treatment plan and taking care of yourself can help you feel better and live longer. Follow-up care is a key part of your treatment and safety.  Be sure to make and go to all appointments, and call your doctor if you are having problems. It's also a good idea to know your test results and keep a list of the medicines you take. How can you care for yourself at home? Staying healthy  · Do not smoke. This is the most important step you can take to prevent more damage to your lungs. If you need help quitting, talk to your doctor about stop-smoking programs and medicines. These can increase your chances of quitting for good. · Avoid colds and flu. Get a pneumococcal vaccine shot. If you have had one before, ask your doctor whether you need a second dose. Get the flu vaccine every fall. If you must be around people with colds or the flu, wash your hands often. · Avoid secondhand smoke, air pollution, and high altitudes. Also avoid cold, dry air and hot, humid air. Stay at home with your windows closed when air pollution is bad. Medicines and oxygen therapy  · Take your medicines exactly as prescribed. Call your doctor if you think you are having a problem with your medicine. · You may be taking medicines such as:  ¨ Bronchodilators. These help open your airways and make breathing easier. Bronchodilators are either short-acting (work for 6 to 9 hours) or long-acting (work for 24 hours). You inhale most bronchodilators, so they start to act quickly. Always carry your quick-relief inhaler with you in case you need it while you are away from home. ¨ Corticosteroids (prednisone, budesonide). These reduce airway inflammation. They come in pill or inhaled form. You must take these medicines every day for them to work well. · A spacer may help you get more inhaled medicine to your lungs. Ask your doctor or pharmacist if a spacer is right for you. If it is, ask how to use it properly. · Do not take any vitamins, over-the-counter medicine, or herbal products without talking to your doctor first.  · If your doctor prescribed antibiotics, take them as directed. Do not stop taking them just because you feel better.  You need to take the full course of antibiotics. · Oxygen therapy boosts the amount of oxygen in your blood and helps you breathe easier. Use the flow rate your doctor has recommended, and do not change it without talking to your doctor first.  Activity  · Get regular exercise. Walking is an easy way to get exercise. Start out slowly, and walk a little more each day. · Pay attention to your breathing. You are exercising too hard if you cannot talk while you are exercising. · Take short rest breaks when doing household chores and other activities. · Learn breathing methods--such as breathing through pursed lips--to help you become less short of breath. · If your doctor has not set you up with a pulmonary rehabilitation program, talk to him or her about whether rehab is right for you. Rehab includes exercise programs, education about your disease and how to manage it, help with diet and other changes, and emotional support. Diet  · Eat regular, healthy meals. Use bronchodilators about 1 hour before you eat to make it easier to eat. Eat several small meals instead of three large ones. Drink beverages at the end of the meal. Avoid foods that are hard to chew. · Eat foods that contain protein so that you do not lose muscle mass. Mental health  · Talk to your family, friends, or a therapist about your feelings. It is normal to feel frightened, angry, hopeless, helpless, and even guilty. Talking openly about bad feelings can help you cope. If these feelings last, talk to your doctor. When should you call for help? Call 911 anytime you think you may need emergency care. For example, call if:  · You have severe trouble breathing. Call your doctor now or seek immediate medical care if:  · You have new or worse trouble breathing. · You cough up blood. · You have a fever.   Watch closely for changes in your health, and be sure to contact your doctor if:  · You cough more deeply or more often, especially if you notice more mucus or a change in the color of your mucus. · You have new or worse swelling in your legs or belly. · You are not getting better as expected. Where can you learn more? Go to http://lindsay-kai.info/. Thor Aquino in the search box to learn more about \"Chronic Obstructive Pulmonary Disease (COPD): Care Instructions. \"  Current as of: May 23, 2016  Content Version: 11.1  © 5312-9898 [employer]. Care instructions adapted under license by [employer] (which disclaims liability or warranty for this information). If you have questions about a medical condition or this instruction, always ask your healthcare professional. [place] [address] any warranty or liability for your use of this information.

## 2017-03-10 NOTE — INTERDISCIPLINARY ROUNDS
Interdisciplinary team rounds were held 3/10/2017 with the following team members:Care Management, Nursing, Nutrition, Pharmacy and Physical Therapy. Plan of care discussed. See clinical pathway and/or care plan for interventions and desired outcomes.     Anticipated date of discharge  3-?-17

## 2017-03-10 NOTE — DIABETES MGMT
[facility]     Blood Glucose Note     Recommendations/ Comments: Chart reviewed for fluctuating blood glucose. Jamee Valdez is a 63 y.o. female with a past medical history significant for DM per Dr. Lola Anton MD's H&P dated 2017. Fasting glucose today: 108 mg/dL (per AM lab). Required 14 units of correction in the last 24 hours. Humalog Mix 75/25 30 units was held this morning. If appropriate, please consider the followin. Decreasing Humalog 75/25 30 units 3 times a day from 30 units to 25 units 3 times a day. This would add up to a total daily dose of 75 units, which would be consistent with 0.5 units/kg/day (for basal + bolus). Recent Glucose Results:   Lab Results   Component Value Date/Time     (H) 03/10/2017 06:43 AM     (H) 2017 10:34 AM    GLUCPOC 123 (H) 03/10/2017 07:54 AM    GLUCPOC 218 (H) 2017 08:16 PM    GLUCPOC 310 (H) 2017 04:55 PM        Hospital (inpatient) medications:  1. Correction Scale: Lispro (Humalog) Insulin Resistant Sensitivity scale to cover for glucose > 139 mg/dL before meals and for glucose >199 at bedtime      2. Humalog 75/25 30 units 3 times a day with meals    Prior to admission medications: per past medical records  - Novolog Mix 70/30 - on a sliding scale after meals  For glucose <150 - 60 units; will add 20 units for every 50 points above 150. A1C:   Lab Results   Component Value Date/Time    Hemoglobin A1c 6.9 2017 04:13 AM    Hemoglobin A1c 6.6 2017 03:30 AM     Lab Results   Component Value Date/Time    Creatinine 3.78 03/10/2017 06:43 AM     Active Orders   Diet    DIET DIABETIC CONSISTENT CARB Regular; 2 GM NA (House Low NA); 70-70-70 (House); AHA-LOW-CHOL FAT        Thank you. Clyde Rice.  Ro Otero, RN, BSN, MPH  Diabetes [address] [place] [address]  [phone]    -For most hospitalized persons with hyperglycemia in the intensive care unit (ICU), a glucose range of 140 to 180 mg/dL is recommended, provided this target can be safely achieved. *  - For general medicine and surgery patients in non-ICU settings, a premeal glucose target <140 mg/dL and a random blood glucose <180 mg/dL are recommended. *    DIEGO Marcus, Elvira Brown., Lamar Dancer., ... & Ian Willoughby ([id number]). AMERICAN ASSOCIATION OF CLINICAL ENDOCRINOLOGISTS AND AMERICAN COLLEGE OF ENDOCRINOLOGY-CLINICAL PRACTICE GUIDELINES FOR DEVELOPING A DIABETES MELLITUS COMPREHENSIVE CARE PLAN-2015-EXECUTIVE SUMMARY: Complete guidelines are available at https://www. aace. com/publications/guidelines. Endocrine Practice, 21(4), [id number].

## 2017-03-10 NOTE — PROGRESS NOTES
Bedside and Verbal shift change report given to William Naidu RN (oncoming nurse) by Radha Brown RN (offgoing nurse). Report included the following information SBAR, Kardex, Intake/Output, MAR, Accordion and Recent Results.

## 2017-03-10 NOTE — PROGRESS NOTES
[facility] Dosing Services: 3/10/2017      Consult for Warfarin Dosing by Pharmacy by Dr. Bailon Aid provided for this 63 y.o. female , for indication of Venous Thrombosis. Day of Therapy - PTA: 4mg on Mon, Fri, 2mg rest of the week  Dose to achieve an INR goal of 2-3     Order entered for Warfarin 4 mg to be given today at 1800    Significant drug interactions: None    Previous dose given 2 mg yesterday    PT/INR Lab Results   Component Value Date/Time    INR 2.0 03/10/2017 06:43 AM      Platelets Lab Results   Component Value Date/Time    PLATELET 781 36/23/8351 10:34 AM      H/H Lab Results   Component Value Date/Time    HGB 8.9 03/09/2017 10:34 AM        Pharmacy to follow daily and will provide subsequent Warfarin dosing based on clinical status.   Pierre Quiros, [facility])  Contact information [phone]

## 2017-03-10 NOTE — PROGRESS NOTES
Ezio Brunner Haugan 81  [address]  [phone]      Medical Progress Note      NAME: [address] [place] [address]   :  1955  MRM:  [id number]    Date/Time: 3/9/2017          Subjective:     Chief Complaint:  F/u CHF, ROSEANN    Chart/notes/labs/studies reviewed, patient examined at bedside. Feels better. No CP. Less SOB. No f/c            Objective:       Vitals:          Last 24hrs VS reviewed since prior progress note. Most recent are:    Visit Vitals    /65    Pulse 74    Temp 98 °F (36.7 °C)    Resp 18    Ht 5' 10\" (1.778 m)    Wt 156.7 kg (345 lb 7.4 oz)    SpO2 96%    BMI 49.57 kg/m2     SpO2 Readings from Last 6 Encounters:   17 96%   17 96%   17 97%   17 97%   02/10/17 99%   17 98%    O2 Flow Rate (L/min): 2 l/min       Intake/Output Summary (Last 24 hours) at 17  Last data filed at 17 8851   Gross per 24 hour   Intake                0 ml   Output             1100 ml   Net            -1100 ml          Exam:     Physical Exam:    Gen: Well-developed, well-nourished, in no acute distress. Obese. Pleasant  HEENT: [place] conjunctivae, hearing intact to voice, moist mucous membranes  Neck: Supple, without masses  Resp: No accessory muscle use, few crackles without any wheezing. Good air movement  Card: No murmurs, normal S1, S2 without thrills, bruits. +1-2 bilateral edema  Abd: Soft, non-tender, non-distended, normoactive bowel sounds are present  Musc: No cyanosis or clubbing  Skin: No rashes, skin turgor is good  Neuro:  Face symmetric, tongue midline, speech fluent  Psych: Good insight, oriented to person, place and time, alert     Medications Reviewed: (see below)    Lab Data Reviewed: (see below)    ______________________________________________________________________    Medications:     Current Facility-Administered Medications   Medication Dose Route Frequency    metOLazone (ZAROXOLYN) tablet 5 mg  5 mg Oral Q MON, WED & FRI    albuterol (PROVENTIL VENTOLIN) nebulizer solution 2.5 mg  2.5 mg Nebulization TID RT    bumetanide (BUMEX) tablet 2 mg  2 mg Oral BID    calcitRIOL (ROCALTROL) capsule 0.25 mcg  0.25 mcg Oral DAILY    epoetin charlie (EPOGEN;PROCRIT) injection 10,000 Units  10,000 Units SubCUTAneous Q7D    [START ON 3/10/2017] predniSONE (DELTASONE) tablet 10 mg  10 mg Oral DAILY WITH BREAKFAST    magnesium oxide (MAG-OX) tablet 400 mg  400 mg Oral BID    insulin lispro protamine/insulin lispro (HUMALOG MIX 75/25) injection 30 Units  30 Units SubCUTAneous TIDPC    nitroglycerin (NITROSTAT) tablet 0.4 mg  0.4 mg SubLINGual PRN    linaclotide (LINZESS) capsule 290 mcg  290 mcg Oral ACB    tiotropium-olodaterol 2.5-2.5 mcg/actuation mist 2 Puff  2 Puff Inhalation ACL    guaiFENesin ER (MUCINEX) tablet 600 mg  600 mg Oral Q12H    benzonatate (TESSALON) capsule 100 mg  100 mg Oral TID PRN    fluticasone (FLONASE) 50 mcg/actuation nasal spray 2 Spray  2 Spray Both Nostrils DAILY    doxycycline (VIBRA-TABS) tablet 100 mg  100 mg Oral Q12H    ondansetron (ZOFRAN) injection 4 mg  4 mg IntraVENous Q8H PRN    isosorbide mononitrate ER (IMDUR) tablet 120 mg  120 mg Oral DAILY    oxyCODONE-acetaminophen (PERCOCET) 5-325 mg per tablet 1 Tab  1 Tab Oral Q8H PRN    polyethylene glycol (MIRALAX) packet 17 g  17 g Oral DAILY PRN    sodium chloride (NS) flush 5-10 mL  5-10 mL IntraVENous Q8H    sodium chloride (NS) flush 5-10 mL  5-10 mL IntraVENous PRN    insulin lispro (HUMALOG) injection   SubCUTAneous AC&HS    glucose chewable tablet 16 g  4 Tab Oral PRN    dextrose (D50W) injection syrg 12.5-25 g  12.5-25 g IntraVENous PRN    glucagon (GLUCAGEN) injection 1 mg  1 mg IntraMUSCular PRN    docusate sodium (COLACE) capsule 100 mg  100 mg Oral QHS    hydrALAZINE (APRESOLINE) tablet 25 mg  25 mg Oral TID    sucralfate (CARAFATE) tablet 1 g  1 g Oral AC&HS    carvedilol (COREG) tablet 25 mg  25 mg Oral BID WITH MEALS    pantoprazole (PROTONIX) tablet 40 mg  40 mg Oral ACB    aspirin chewable tablet 81 mg  81 mg Oral DAILY    atorvastatin (LIPITOR) tablet 80 mg  80 mg Oral QPM    Warfarin- Pharmacy Dosing   Other Rx Dosing/Monitoring            Lab Review:     Recent Labs      03/09/17   1034  03/08/17   0852  03/07/17 0713   WBC  14.2*  19.6*  17.1*   HGB  8.9*  9.4*  8.7*   HCT  29.1*  29.9*  28.1*   PLT  363  356  352     Recent Labs      03/09/17   1034  03/08/17   0852  03/07/17 0715  03/07/17 0713   NA  138  140   --   141   K  4.2  4.0   --   4.9   CL  103  107   --   108   CO2  22  21   --   25   GLU  190*  152*   --   123*   BUN  75*  66*   --   67*   CREA  3.63*  3.43*   --   3.54*   CA  8.7  8.5   --   8.2*   MG  1.4*  1.5*   --   1.6   PHOS  4.2  4.0   --   3.5   ALB   --   3.0*   --    --    SGOT   --   8*   --    --    ALT   --   17   --    --    INR  2.2*  2.3*  2.7*   --      No components found for: GLPOC  No results for input(s): PH, PCO2, PO2, HCO3, FIO2 in the last 72 hours. Recent Labs      03/09/17   1034  03/08/17   0852  03/07/17 0715   INR  2.2*  2.3*  2.7*     Lab Results   Component Value Date/Time    Specimen Description: BLOOD 10/14/2013 03:37 AM    Specimen Description: BLOOD 10/11/2013 12:03 AM    Specimen Description: NARES 07/12/2013 04:50 AM     Lab Results   Component Value Date/Time    Culture result: NO GROWTH 6 DAYS 02/28/2017 04:26 PM    Culture result: HEAVY  NORMAL RESPIRATORY WAGNER   02/03/2017 04:10 PM    Culture result: MODERATE  CANDIDA TROPICALIS   02/03/2017 04:10 PM    Culture result:  02/03/2017 04:10 PM     CULTURE WILL BE HELD FOR 4 WEEKS. IF THERE IS ADDITIONAL FUNGAL GROWTH, A NEW REPORT WILL FOLLOW. Assessment / Plan:     64 yo AAF w/ hx of HTN, DM, DVT, CAD s/p CABG, CKD 4, COPD, ILD, presented with dyspnea, COPD, ILD.  Complicated by worsening renal failure.  Transfer care from [facility] to Hospitalist service on 03/02      Acute on chronic respiratory failure/hypoxia: likely due to COPD and underlying mild RB-ILD (biopsy-proven smoking-related ILD). No evidence for pneumonia. V/Q scan low probability for PE. Echo neg for pericardial effusion. Needs RHC and may be LHC at some point. Cr currently precludes cath  -- diuresis per nephrology, added metolazone  -- cont nebs, steroid taper    ARF/CKD 4: unclear etiology. Cr stable. Nephrology following. Likely heading towards RRT soon. Discussed with Dr. Laney Hernandez  -- monitor BMP on diuresis. Cr up slightly today    L sided chest pain: has significant cardiac hx. Stable angina vs MSK. ECG and CE negative. -- monitor closely, no recent symptoms  -- would benefit from [facility] but precluded by severe renal insufficiency. Cardiology was following. May have to be done outpatient or once pt is on dialysis    COPD w/ acute exacerbation: improved, pulm signed off  -- as above     Obstructive sleep apnea  -- CPAP QHS        Chronic diastolic CHF/HTN:  -- diuresis as above. Strict I/Os, daily weight, low Na diet  -- Cont coreg, imdur, hydralazine      DM type 2 w/ renal complications: last W5X 4.7%. BS elevated from steroids. DM educator following.   -- cont insulin 75/25    LLE DVT: Repeat venous dopplers shows resolution.  Low probability for pulmonary embolism on VQ  -- with risk of PAH and ongoing lung parenchyma dysfunction, cont coumadin, monitor INR. 2.2 today        Total time spent with patient: 25 minutes                  Care Plan discussed with: Patient, Nursing Staff and >50% of time spent in counseling and coordination of care    Discussed:  Care Plan and D/C Planning    Prophylaxis:  Coumadin    Disposition:  Home w/Family           ___________________________________________________    Attending Physician: Suhail Garber MD

## 2017-03-10 NOTE — PROGRESS NOTES
Bedside and Verbal shift change report given to CRISTINA Zhao (oncoming nurse) by Mary Perez RN (offgoing nurse). Report included the following information SBAR, Kardex, Procedure Summary, Intake/Output, MAR, Recent Results and Med Rec Status.

## 2017-03-14 ENCOUNTER — PATIENT OUTREACH (OUTPATIENT)
Dept: FAMILY MEDICINE CLINIC | Age: 62
End: 2017-03-14

## 2017-03-14 NOTE — Clinical Note
Fletcher Feliciano and Juan Patricia,  Patient back home, S/P 2/28/17 - 3/10/17 at [facility] related to Pneumonia/COPD Exacerbation. I scheduled F/U appt with Dr Ramirez Feliciano, tomorrow, 3/15. Please see my note. Looks like INR on 3/10 was at 2.0 She will need close F/U. Please let me know if you have further recommendations or need me to reinforce anything.  Thanks, Jerryl Siemens

## 2017-03-14 NOTE — PROGRESS NOTES
NNTOCIP  3/14/17, Patient listed on hospital discharge [facility]) report dated for 03/10/2017. Patient hospitalized 2/28/17 - 3/10/17 at [facility] related to Pneumonia/COPD Exacerbation. Per Discharge Summary:  Acute on chronic respiratory failure/hypoxia: likely due to COPD and underlying mild RB-ILD (biopsy-proven smoking-related ILD). No evidence for pneumonia. V/Q scan low probability for PE. Echo neg for pericardial effusion. Needs RHC and perhaps LHC at some point. Cr currently precludes cath. Diuresis per nephrology, to continue Bumex. Added metolazone to usual regimen. Cont nebs, steroid taper. Close follow up with PCP, pulm.    ARF/CKD 4: unclear etiology. Cr stable. Nephrology following. Likely heading towards RRT soon. Monitor BMP on diuresis. Cr up slightly. Discussed with Dr. Ed Cabello today; stable for discharge. Close PCP follow up. Chronic diastolic CHF/HTN: Diuresis as above. Cont coreg, imdur, hydralazine. 3/14/17, This writer/NN contacted patient at listed phone number, HIPAA verified with 2 identifiers. Patient allowed this writer/NN to explain purpose of call, post hospitalization. Patient verbalizes good understanding of discharge instructions, medications and F/U appts. Patient states feels good to be up and moving, reports loss of 20 pounds and agrees to continue to monitor. Patient allowed this writer/NN to assist with scheduling F/U appt with Dr Delia Naidu, scheduled for tomorrow, 3/15/17 at 2:30pm. Patient verbalizes understanding and plans to attend as scheduled. Patient states breathing better, fluid coming off, able to participate in ADL's, even doing some cooking. Patient agrees to call PULM, Cardiology and Nephrology to schedule needed F/U appts and agrees to notify Dr Delia Naidu of appts or concerns at F/U appt, tomorrow.  Patient states Reflux remains a problem, states did see GI recently, outpatient, states medication normally used to treat esophageal spasm would interfere with heart meds, so not a lot of relief with current medications. Patient denies need for [facility] services at this time. Allowed patient extra time to express concerns of worsening kidney function and possible need to begin Dialysis soon. Patient would like printed renal diet recommendations(instruction provided on local [facility] and this writer/NN to print samples from the recommended renal diet, [email or web address]) and agrees to discuss further with Nephrology. Patient denies further questions or concerns at this time and agrees to allow this writer/NN to continue to follow. See Previous NN/Patient Outreach Documentation:   Patient kept PULM F/U appt, 2/28/17 and is being sent back to ED/further medical evaluation. S/P [facility], 2/16/17 - 2/20/17 related to CHF Exacerbation. Patient kept F/U appt with Dr Delia Naidu, 2/23/17, next scheduled appt, 3/9/17, to see PULM, 2/28/17. S/P [facility], 2/2/17 - 2/10/17 related to Resp. Failure/Interstitial Lung Disease, DVT, Diastolic HF, PULM HTN and CKD. 2/16/17, Patient in office to establish PCP with Dr Delia Naidu, reports recent [facility] hospitalization and ED visit. Dr Delia Naidu contacted this writer/NN with concerns of 30 pound weight gain since discharge, INR at 5. 4(recent DVT on coumadin), Diabetes, CHF,and CKD, recommends patient return to [facility] for further medical evaluation

## 2017-03-15 ENCOUNTER — OFFICE VISIT (OUTPATIENT)
Dept: FAMILY MEDICINE CLINIC | Age: 62
End: 2017-03-15

## 2017-03-15 ENCOUNTER — HOSPITAL ENCOUNTER (OUTPATIENT)
Dept: LAB | Age: 62
Discharge: HOME | End: 2017-03-15
Payer: MEDICARE

## 2017-03-15 VITALS
BODY MASS INDEX: 41.95 KG/M2 | SYSTOLIC BLOOD PRESSURE: 136 MMHG | TEMPERATURE: 98.2 F | HEART RATE: 88 BPM | DIASTOLIC BLOOD PRESSURE: 67 MMHG | RESPIRATION RATE: 18 BRPM | OXYGEN SATURATION: 95 % | WEIGHT: 293 LBS | HEIGHT: 70 IN

## 2017-03-15 DIAGNOSIS — S29.012A RHOMBOID MUSCLE STRAIN, INITIAL ENCOUNTER: ICD-10-CM

## 2017-03-15 DIAGNOSIS — N18.4 CKD (CHRONIC KIDNEY DISEASE) STAGE 4, GFR 15-29 ML/MIN (HCC): Primary | Chronic | ICD-10-CM

## 2017-03-15 DIAGNOSIS — I82.4Y9 DEEP VEIN THROMBOSIS (DVT) OF PROXIMAL LOWER EXTREMITY, UNSPECIFIED CHRONICITY, UNSPECIFIED LATERALITY (HCC): ICD-10-CM

## 2017-03-15 LAB
INR BLD: 1.9
PT POC: 23.2 SEC
VALID INTERNAL CONTROL?: YES

## 2017-03-15 PROCEDURE — 80048 BASIC METABOLIC PNL TOTAL CA: CPT

## 2017-03-15 NOTE — PROGRESS NOTES
Chief Complaint   Patient presents with    Pneumonia     Patient here for hospital f/u dx PNA and elevated creatine level. Patient reports she has pain in her right upper back area which radiates to the distal end of her sternum and extends  underneath her left breast. She reports being fatigued also.

## 2017-03-15 NOTE — PATIENT INSTRUCTIONS
Diabetic Renal Diet: Care Instructions  Your Care Instructions  You may already be spreading carbohydrate throughout your daily meals. When you also have kidney disease, you need to avoid foods that make your kidneys worse. Keep your blood sugar and blood pressure as near normal as you can to reduce your chance of kidney failure. Your doctor and dietitian will help you make an eating plan. It will be based on your body weight, size, and medical condition. You may need to limit salt, fluids, and protein. You also may need to limit minerals such as potassium and phosphorus. It takes planning, but there are plenty of tasty, healthy foods you can eat. Always talk with your doctor or dietitian before you make changes in your diet. Follow-up care is a key part of your treatment and safety. Be sure to make and go to all appointments, and call your doctor if you are having problems. It's also a good idea to know your test results and keep a list of the medicines you take. How can you care for yourself at home? · Work with your doctor or dietitian to create a food plan that guides your daily food choices. · Eat regular meals. Do not skip meals or go for many hours without eating. To help control your blood sugar, try to eat several small meals during the day, rather than three large ones. · You can use margarine, mayonnaise, and oil to add calories to your diet for energy. The healthiest oils are olive, canola, and safflower oils. · Talk to your dietitian about eating sweets, including honey and sugar. · Be safe with medicines. Take your medicines exactly as prescribed. Call your doctor if you think you are having a problem with your medicine. You will get more details on the specific medicines your doctor prescribes. · Do not take any other medicine without talking to your doctor first. This includes over-the-counter medicines, vitamins, and herbal products. · Limit alcohol to no more than 1 drink per day.  Count it as part of your fluid allowance. To get the right amount of protein  · Ask your doctor or dietitian how much protein you can have each day. You need some protein to stay healthy. · Include all sources of protein in your daily protein count. Besides meat, poultry, and fish, protein is found in milk and milk products, breads, cereals, and most vegetables. To limit salt  · Do not add salt to your food. Avoid foods that list salt, sodium, or MSG as an ingredient. And look for \"reduced salt\" or \"low sodium\" on labels. · Do not use a salt substitute or lite salt unless your doctor says it is okay. (These products are high in potassium.)  · Avoid or use very small amounts of condiments and marinades. These include soy sauce, fish sauce, and barbecue sauce. They are high in sodium. · Avoid salted pretzels, chips, and other salted snacks. · Check food labels to become more aware of the sodium content of foods. Foods that are high in sodium include soups; many canned foods; cured, smoked, or dried meats; and many packaged foods. To control carbohydrate  · Spread carbohydrate throughout the day. This helps to prevent high blood sugar after meals. Ask your dietitian how much carbohydrate you can have. Carbohydrate foods include:  ¨ Whole-grain and refined breads and cereals, and some vegetables such as peas and beans. ¨ Fruits, milk, and milk products (except cheese). ¨ Candy, table sugar, and regular carbonated drinks. To limit fluids  · Know what your fluid allowance is. Fill a pitcher with that amount of water every day. If you drink another fluid (such as coffee) that day, pour an equal amount out of the pitcher. · Foods that are liquid at room temperature count as fluids. These include ice cream and gelatin desserts such as Jell-O. To limit potassium  · Fruits that are low in potassium include blueberries and raspberries. · Vegetables that are low in potassium include cucumber and radishes.   To limit phosphorus  · Follow your doctor's or dietitian's plan for your limit on milk and milk products in your diet. · Avoid nuts, peanut butter, seeds, lentils, beans, organ meats, and sardines. · Avoid cola drinks. · Avoid bran breads or bran cereals. They are high in phosphorus. Where can you learn more? Go to http://lindsay-kai.info/. Enter W425 in the search box to learn more about \"Diabetic Renal Diet: Care Instructions. \"  Current as of: May 23, 2016  Content Version: 11.1  © 6239-1035 [employer]. Care instructions adapted under license by [employer] (which disclaims liability or warranty for this information). If you have questions about a medical condition or this instruction, always ask your healthcare professional. [address] any warranty or liability for your use of this information. Rhomboid Muscle Strain: Rehab Exercises  Your Care Instructions  Here are some examples of typical rehabilitation exercises for your condition. Start each exercise slowly. Ease off the exercise if you start to have pain. Your doctor or physical therapist will tell you when you can start these exercises and which ones will work best for you. How to do the exercises  Lower neck and upper back (rhomboid) stretch    1. Stretch your arms out in front of your body. Clasp one hand on top of your other hand. 2. Gently reach out so that you feel your shoulder blades stretching away from each other. 3. Gently bend your head forward. 4. Hold for 15 to 30 seconds. 5. Repeat 2 to 4 times. Resisted rows    Note: For this exercise, you will need elastic exercise material, such as surgical tubing or Thera-Band. 1. Put the band around a solid object, such as a bedpost, at about waist level. Stand facing where you have placed the band. Hold equal lengths of the band in each hand. 2. Start with your arms held out in front of you.   3. Pull the bands back, and move your shoulder blades together. As you finish, your elbows should be at your side and bent at 90 degrees (like the angle of the letter \"L\"). 4. Return to the starting position. 5. Repeat 8 to 12 times. Neck stretches    1. Look straight ahead, and tip your right ear to your right shoulder. Do not let your left shoulder rise as you tip your head to the right. 2. Hold for 15 to 30 seconds. 3. Tilt your head to the left. Do not let your right shoulder rise as you tip your head to the left. 4. Hold for 15 to 30 seconds. 5. Repeat 2 to 4 times to each side. Neck rotation    1. Sit in a firm chair, or stand up straight. 2. Keeping your chin level, turn your head to the right, and hold for 15 to 30 seconds. 3. Turn your head to the left, and hold for 15 to 30 seconds. 4. Repeat 2 to 4 times to each side. Follow-up care is a key part of your treatment and safety. Be sure to make and go to all appointments, and call your doctor if you are having problems. It's also a good idea to know your test results and keep a list of the medicines you take. Where can you learn more? Go to http://lindsay-kai.info/. Enter 0841 31 00 89 in the search box to learn more about \"Rhomboid Muscle Strain: Rehab Exercises. \"  Current as of: May 23, 2016  Content Version: 11.1  © 2248-7346 [employer], [employer]. Care instructions adapted under license by [employer] (which disclaims liability or warranty for this information). If you have questions about a medical condition or this instruction, always ask your healthcare professional. [place] [address] any warranty or liability for your use of this information.

## 2017-03-15 NOTE — PROGRESS NOTES
Levi Pittman is a 63 y.o. female   Chief Complaint   Patient presents with    Pneumonia    pt here for hospital f/u. Pt is going to be having dialysis access placed, see Dr William Mckeon on Wednesday. Recent GFR was 15. Pt states her breathing is doing well on the oxygen. Pt is checking her weight daily and states it is steadily going down. Pt was also on steroids while in hospital.  Pt is having some R scapula pain between scapula and spine. Pt also with some sternum pain as well.      she is a 63y.o. year old female who presents for evalution. Reviewed PmHx, RxHx, FmHx, SocHx, AllgHx and updated and dated in the chart. Review of Systems - negative except as listed above in the HPI    Objective:     Vitals:    03/15/17 1433   BP: 136/67   Pulse: 88   Resp: 18   Temp: 98.2 °F (36.8 °C)   TempSrc: Oral   SpO2: 95%   Weight: 327 lb (148.3 kg)   Height: 5' 10\" (1.778 m)       Current Outpatient Prescriptions   Medication Sig    linaclotide (LINZESS) 290 mcg cap capsule Take 1 Cap by mouth Daily (before breakfast) for 10 days.  magnesium oxide (MAG-OX) 400 mg tablet Take 1 Tab by mouth two (2) times a day.  metOLazone (ZAROXOLYN) 5 mg tablet Take 1 Tab by mouth every Monday, Wednesday, Friday.  polyethylene glycol (MIRALAX) 17 gram packet Take 17 g by mouth daily as needed.  warfarin (COUMADIN) 2 mg tablet Take 4 mg by mouth two (2) days a week. Monday and Friday    warfarin (COUMADIN) 2 mg tablet Take 2 mg by mouth five (5) days a week. Tues, Wed, Thurs, Sa, Perry    senna (SENNA) 8.6 mg tablet Take 2 Tabs by mouth nightly.  docusate sodium (COLACE) 100 mg capsule Take 100 mg by mouth nightly.  insulin aspart protamine/insulin aspart (NOVOLOG MIX 70-30) 100 unit/mL (70-30) injection by SubCUTAneous route. Uses sliding scale    tiotropium-olodaterol (STIOLTO RESPIMAT) 2.5-2.5 mcg/actuation mist Take 2 Puffs by inhalation Daily (before lunch).  Uses daily at 11 am     oxyCODONE-acetaminophen (PERCOCET) 5-325 mg per tablet Take 1 Tab by mouth every eight (8) hours as needed for Pain. Max Daily Amount: 3 Tabs.  hydrALAZINE (APRESOLINE) 25 mg tablet Take 1 Tab by mouth three (3) times daily.  bumetanide (BUMEX) 2 mg tablet Take 1 Tab by mouth two (2) times a day.  ondansetron (ZOFRAN ODT) 4 mg disintegrating tablet Take 1 Tab by mouth every six (6) hours as needed for Nausea.  albuterol-ipratropium (DUONEB) 2.5 mg-0.5 mg/3 ml nebu 3 mL by Nebulization route daily as needed. Takes about 4 days a week    sucralfate (CARAFATE) 1 gram tablet Take 1 g by mouth Before breakfast, lunch, dinner and at bedtime.  fluticasone (FLONASE) 50 mcg/actuation nasal spray 2 Sprays by Both Nostrils route nightly as needed.  albuterol (PROVENTIL HFA, VENTOLIN HFA, PROAIR HFA) 90 mcg/actuation inhaler Take 2 Puffs by inhalation every four (4) hours as needed for Wheezing.  calcitRIOL (ROCALTROL) 0.25 mcg capsule Take 0.25 mcg by mouth two (2) days a week. Patient takes on Monday and Thursday    isosorbide mononitrate ER (IMDUR) 120 mg CR tablet TAKE 1 TABLET BY MOUTH EVERY DAY    carvedilol (COREG) 25 mg tablet TAKE 1 TABLET BY MOUTH TWICE A DAY    pantoprazole (PROTONIX) 40 mg tablet Take 1 Tab by mouth daily. Indications: GASTROESOPHAGEAL REFLUX    nitroglycerin (NITROSTAT) 0.3 mg SL tablet 1 Tab by SubLINGual route every five (5) minutes as needed for Chest Pain.  ergocalciferol (VITAMIN D2) 50,000 unit capsule Take 50,000 Units by mouth every Tuesday and Thursday.  aspirin 81 mg tablet Take 81 mg by mouth daily.  atorvastatin (LIPITOR) 80 mg tablet Take 80 mg by mouth every evening.  mupirocin (BACTROBAN) 2 % ointment Apply  to affected area two (2) times daily as needed (lesions on feet when needed). Ointment external two times daily to lesions on feet until healed - now using as needed     No current facility-administered medications for this visit. Physical Examination: General appearance - alert, well appearing, and in no distress  Eyes - pupils equal and reactive, extraocular eye movements intact  Chest - clear to auscultation, no wheezes, rales or rhonchi, symmetric air entry  Heart - normal rate, regular rhythm, normal S1, S2, no murmurs, rubs, clicks or gallops  Musculoskeletal - L rhomboids TTP  Extremities - pedal edema 2 +  Skin - normal coloration and turgor, no rashes, no suspicious skin lesions noted      Assessment/ Plan:   Rosy Novoa was seen today for pneumonia. Diagnoses and all orders for this visit:    CKD (chronic kidney disease) stage 4, GFR 15-29 ml/min ([facility])  F/u with renal 3/22/17, recheck BMP today    Deep vein thrombosis (DVT) of proximal lower extremity, unspecified chronicity, unspecified laterality ([facility])  -     AMB POC PT/INR - INR 1.9 maintain current dose f/u 2 weeks    Rhomboid muscle strain, initial encounter  Discussed stretches     Follow-up Disposition:  Return in about 2 weeks (around 3/29/2017), or if symptoms worsen or fail to improve, for INR check. I have discussed the diagnosis with the patient and the intended plan as seen in the above orders. The patient has received an after-visit summary and questions were answered concerning future plans. Pt conveyed understanding of plan.     Medication Side Effects and Warnings were discussed with patient      Tatiana Borja DO

## 2017-03-16 LAB
BUN SERPL-MCNC: 79 MG/DL (ref 6–24)
BUN/CREAT SERPL: 22 (ref 9–23)
CALCIUM SERPL-MCNC: 9.4 MG/DL (ref 8.7–10.2)
CHLORIDE SERPL-SCNC: 97 MMOL/L (ref 96–106)
CO2 SERPL-SCNC: 27 MMOL/L (ref 18–29)
CREAT SERPL-MCNC: 3.52 MG/DL (ref 0.57–1)
GLUCOSE SERPL-MCNC: 60 MG/DL (ref 65–99)
INTERPRETATION: NORMAL
POTASSIUM SERPL-SCNC: 4 MMOL/L (ref 3.5–5.2)
SODIUM SERPL-SCNC: 140 MMOL/L (ref 134–144)

## 2017-03-20 LAB
ACID FAST STN SPEC: NEGATIVE
MYCOBACTERIUM SPEC QL CULT: NEGATIVE
SPECIMEN PREPARATION: NORMAL
SPECIMEN SOURCE: NORMAL

## 2017-03-22 ENCOUNTER — TELEPHONE (OUTPATIENT)
Dept: FAMILY MEDICINE CLINIC | Age: 62
End: 2017-03-22

## 2017-03-27 ENCOUNTER — PATIENT OUTREACH (OUTPATIENT)
Dept: FAMILY MEDICINE CLINIC | Age: 62
End: 2017-03-27

## 2017-03-29 ENCOUNTER — OFFICE VISIT (OUTPATIENT)
Dept: FAMILY MEDICINE CLINIC | Age: 62
End: 2017-03-29

## 2017-03-29 VITALS
DIASTOLIC BLOOD PRESSURE: 70 MMHG | HEART RATE: 84 BPM | TEMPERATURE: 98.2 F | HEIGHT: 70 IN | BODY MASS INDEX: 41.95 KG/M2 | SYSTOLIC BLOOD PRESSURE: 142 MMHG | OXYGEN SATURATION: 98 % | WEIGHT: 293 LBS | RESPIRATION RATE: 18 BRPM

## 2017-03-29 DIAGNOSIS — I82.4Y9 DEEP VEIN THROMBOSIS (DVT) OF PROXIMAL LOWER EXTREMITY, UNSPECIFIED CHRONICITY, UNSPECIFIED LATERALITY (HCC): ICD-10-CM

## 2017-03-29 DIAGNOSIS — N18.4 CKD (CHRONIC KIDNEY DISEASE) STAGE 4, GFR 15-29 ML/MIN (HCC): Primary | Chronic | ICD-10-CM

## 2017-03-29 DIAGNOSIS — I10 ESSENTIAL HYPERTENSION: Chronic | ICD-10-CM

## 2017-03-29 DIAGNOSIS — G25.81 RLS (RESTLESS LEGS SYNDROME): ICD-10-CM

## 2017-03-29 DIAGNOSIS — Z51.81 MEDICATION MONITORING ENCOUNTER: ICD-10-CM

## 2017-03-29 LAB
INR BLD: 2.3
PT POC: 27.2 SEC
VALID INTERNAL CONTROL?: YES

## 2017-03-29 RX ORDER — ROPINIROLE 0.25 MG/1
TABLET, FILM COATED ORAL
Qty: 30 TAB | Refills: 1 | Status: SHIPPED | OUTPATIENT
Start: 2017-03-29 | End: 2017-04-17 | Stop reason: HOSPADM

## 2017-03-29 RX ORDER — METHYLDOPA 250 MG/1
250 TABLET, FILM COATED ORAL EVERY 12 HOURS
Qty: 60 TAB | Refills: 1 | Status: SHIPPED | OUTPATIENT
Start: 2017-03-29 | End: 2017-04-17 | Stop reason: HOSPADM

## 2017-03-29 NOTE — PROGRESS NOTES
Pt here for PT INR check  Also congestion for past 2-3 days  Also states she has been lightheaded and having frequent headaches since starting new bp med

## 2017-03-29 NOTE — PATIENT INSTRUCTIONS
Ropinirole (By mouth)   Ropinirole ([id number])  Treats Parkinson disease and restless legs syndrome (RLS). Brand Name(s):Requip, Requip XL   There may be other brand names for this medicine. When This Medicine Should Not Be Used: This medicine is not right for everyone. Do not use it if you had an allergic reaction to ropinirole. How to Use This Medicine:   Tablet, Long Acting Tablet  · Take your medicine as directed. Your dose may need to be changed several times to find what works best for you. · Swallow the extended-release tablet whole. Do not crush, break, or chew it. · Read and follow the patient instructions that come with this medicine. Talk to your doctor or pharmacist if you have any questions. · Missed dose:   ¨ Parkinson disease: Take a dose as soon as you remember. If it is almost time for your next dose, wait until then and take a regular dose. Do not take extra medicine to make up for a missed dose. ¨ RLS: Skip the missed dose, and take your next dose at the regular time. Do not use extra medicine to make up for a missed dose. · Store the medicine in a closed container at room temperature, away from heat, moisture, and direct light. Drugs and Foods to Avoid:   Ask your doctor or pharmacist before using any other medicine, including over-the-counter medicines, vitamins, and herbal products. · Some foods and medicines can affect how ropinirole works. Tell your doctor knows if you are using any of the following:  ¨ Ciprofloxacin, levodopa, metoclopramide, thiothixene  ¨ Birth control pills, or estrogen to treat menopausal symptoms  ¨ Phenothiazine medicine (such as chlorpromazine, perphenazine, prochlorperazine, promethazine, thioridazine)  · Tell your doctor if you use anything else that makes you sleepy. Some examples are allergy medicine, narcotic pain medicine, and alcohol.   Warnings While Using This Medicine:   · Tell your doctor if you are pregnant or breastfeeding, or if you have kidney disease, liver disease, a sleep disorder, high or low blood pressure, heart disease, heart rhythm problems, lung disease, dyskinesia, or a history of skin cancer or mental health problems. Tell your doctor if you smoke or drink alcohol. · This medicine may cause the following problems:  ¨ Unusual changes in mood or behavior, compulsive behaviors, hallucinations  ¨ Increased risk for skin cancer  · This medicine may make you dizzy or drowsy. It may even cause you to fall asleep without warning. Tell your doctor right away if you learn that this has happened. Do not drive or do anything that could be dangerous until you know how this medicine affects you. Stand up slowly if you are dizzy. · Do not stop using this medicine suddenly. Your doctor will need to slowly decrease your dose before you stop it completely. · Your doctor will check your progress and the effects of this medicine at regular visits. Keep all appointments. · Call your doctor if your symptoms do not improve or if they get worse. For RLS, the symptoms might get worse in the early morning, start earlier in the afternoon, or spread to your arms. · Keep all medicine out of the reach of children. Never share your medicine with anyone.   Possible Side Effects While Using This Medicine:   Call your doctor right away if you notice any of these side effects:  · Allergic reaction: Itching or hives, swelling in your face or hands, swelling or tingling in your mouth or throat, chest tightness, trouble breathing  · Chest pain, trouble breathing, fast or slow heartbeat  · Confusion, unusual changes in mood or behavior, behaviors you cannot control  · Extreme sleepiness or drowsiness  · Fever, sweating, muscle stiffness  · Lightheadedness, dizziness, fainting  · Seeing, hearing, or feeling things that are not really there  · Skin changes or growths  · Twitching or muscle movements you cannot control (either new or worse than usual)  If you notice these less serious side effects, talk with your doctor:   · Headache  · Nausea, vomiting, constipation, stomach upset  · Tiredness  If you notice other side effects that you think are caused by this medicine, tell your doctor. Call your doctor for medical advice about side effects. You may report side effects to FDA at 5-809-KEO-0523  © 2016 [address] is for End User's use only and may not be sold, redistributed or otherwise used for commercial purposes. The above information is an  only. It is not intended as medical advice for individual conditions or treatments. Talk to your doctor, nurse or pharmacist before following any medical regimen to see if it is safe and effective for you.

## 2017-03-29 NOTE — PROGRESS NOTES
Romain Joseph is a 63 y.o. female   Chief Complaint   Patient presents with    Coagulation disorder    Cold Symptoms    Dizziness    pt states that since having started the hydralazine she has been getting dizzy everytime she stands up and also getting LOZANO's. Michel lstop, pt was taken off of norvasc because of the s/e of fluid retention/edema but pt states she had no changes once she stopped it. Pt otherwise doing ok and her INR is at goal will continue with current dosing and recheck in 1 month. Pt also c/o having sore muscles all over, has been trying to be more active. Pt also feels that she needs to continuously move her legs in the evening. she is a 63y.o. year old female who presents for evalution. Reviewed PmHx, RxHx, FmHx, SocHx, AllgHx and updated and dated in the chart. Review of Systems - negative except as listed above in the HPI    Objective:     Vitals:    03/29/17 1450   BP: 142/70   Pulse: 84   Resp: 18   Temp: 98.2 °F (36.8 °C)   TempSrc: Oral   SpO2: 98%   Weight: 319 lb (144.7 kg)   Height: 5' 10\" (1.778 m)       Current Outpatient Prescriptions   Medication Sig    methyldopa (ALDOMET) 250 mg tablet Take 1 Tab by mouth every twelve (12) hours.  rOPINIRole (REQUIP) 0.25 mg tablet 1-2 hours PO prior to bed    magnesium oxide (MAG-OX) 400 mg tablet Take 1 Tab by mouth two (2) times a day.  metOLazone (ZAROXOLYN) 5 mg tablet Take 1 Tab by mouth every Monday, Wednesday, Friday.  polyethylene glycol (MIRALAX) 17 gram packet Take 17 g by mouth daily as needed.  warfarin (COUMADIN) 2 mg tablet Take 4 mg by mouth two (2) days a week. Monday and Friday    warfarin (COUMADIN) 2 mg tablet Take 2 mg by mouth five (5) days a week. Tues, Wed, Thurs, Sa, Perry    senna (SENNA) 8.6 mg tablet Take 2 Tabs by mouth nightly.  docusate sodium (COLACE) 100 mg capsule Take 100 mg by mouth nightly.     insulin aspart protamine/insulin aspart (NOVOLOG MIX 70-30) 100 unit/mL (70-30) injection by SubCUTAneous route. Uses sliding scale    tiotropium-olodaterol (STIOLTO RESPIMAT) 2.5-2.5 mcg/actuation mist Take 2 Puffs by inhalation Daily (before lunch). Uses daily at 11 am     oxyCODONE-acetaminophen (PERCOCET) 5-325 mg per tablet Take 1 Tab by mouth every eight (8) hours as needed for Pain. Max Daily Amount: 3 Tabs.  bumetanide (BUMEX) 2 mg tablet Take 1 Tab by mouth two (2) times a day.  ondansetron (ZOFRAN ODT) 4 mg disintegrating tablet Take 1 Tab by mouth every six (6) hours as needed for Nausea.  albuterol-ipratropium (DUONEB) 2.5 mg-0.5 mg/3 ml nebu 3 mL by Nebulization route daily as needed. Takes about 4 days a week    sucralfate (CARAFATE) 1 gram tablet Take 1 g by mouth Before breakfast, lunch, dinner and at bedtime.  fluticasone (FLONASE) 50 mcg/actuation nasal spray 2 Sprays by Both Nostrils route nightly as needed.  mupirocin (BACTROBAN) 2 % ointment Apply  to affected area two (2) times daily as needed (lesions on feet when needed). Ointment external two times daily to lesions on feet until healed - now using as needed    albuterol (PROVENTIL HFA, VENTOLIN HFA, PROAIR HFA) 90 mcg/actuation inhaler Take 2 Puffs by inhalation every four (4) hours as needed for Wheezing.  calcitRIOL (ROCALTROL) 0.25 mcg capsule Take 0.25 mcg by mouth two (2) days a week. Patient takes on Monday and Thursday    isosorbide mononitrate ER (IMDUR) 120 mg CR tablet TAKE 1 TABLET BY MOUTH EVERY DAY    carvedilol (COREG) 25 mg tablet TAKE 1 TABLET BY MOUTH TWICE A DAY    pantoprazole (PROTONIX) 40 mg tablet Take 1 Tab by mouth daily. Indications: GASTROESOPHAGEAL REFLUX    nitroglycerin (NITROSTAT) 0.3 mg SL tablet 1 Tab by SubLINGual route every five (5) minutes as needed for Chest Pain.  ergocalciferol (VITAMIN D2) 50,000 unit capsule Take 50,000 Units by mouth every Tuesday and Thursday.  aspirin 81 mg tablet Take 81 mg by mouth daily.     atorvastatin (LIPITOR) 80 mg tablet Take 80 mg by mouth every evening. No current facility-administered medications for this visit. Physical Examination: General appearance - alert, well appearing, and in no distress  Mental status - alert, oriented to person, place, and time  Eyes - pupils equal and reactive, extraocular eye movements intact  Chest - clear to auscultation, no wheezes, rales or rhonchi, symmetric air entry  Heart - normal rate, regular rhythm, normal S1, S2, no murmurs, rubs, clicks or gallops      Assessment/ Plan:   Lashae Charles was seen today for coagulation disorder, cold symptoms and dizziness. Diagnoses and all orders for this visit:    CKD (chronic kidney disease) stage 4, GFR 15-29 ml/min ([facility])  Being followed by nephro  Deep vein thrombosis (DVT) of proximal lower extremity, unspecified chronicity, unspecified laterality ([facility])  -     AMB POC PT/INR    Essential hypertension  -     methyldopa (ALDOMET) 250 mg tablet; Take 1 Tab by mouth every twelve (12) hours. Medication monitoring encounter  -     AMB POC PT/INR    RLS (restless legs syndrome)  -     rOPINIRole (REQUIP) 0.25 mg tablet; 1-2 hours PO prior to bed       Follow-up Disposition:  Return in about 1 month (around 4/29/2017), or if symptoms worsen or fail to improve. I have discussed the diagnosis with the patient and the intended plan as seen in the above orders. The patient has received an after-visit summary and questions were answered concerning future plans. Pt conveyed understanding of plan.     Medication Side Effects and Warnings were discussed with patient      [facility], DO

## 2017-04-04 ENCOUNTER — PATIENT OUTREACH (OUTPATIENT)
Dept: FAMILY MEDICINE CLINIC | Age: 62
End: 2017-04-04

## 2017-04-04 NOTE — PROGRESS NOTES
Patient S/P 2/28/17 - 3/10/17 at [facility] related to Pneumonia/COPD Exacerbation. Patient kept F/U appt with Dr Libby Ceron, 3/15/17 and Dr Rusty Lunsford, 3/22/17. Also kept scheduled appt with Dr Libby Ceron, 3/29/17 at 2:30pm  4/4/17, Per chart review/this writer/NN and [facility] access, patient has required further hospitalization, admitted, 4/2/17 to [facility] related to chest pain and severe Hypokalemia(K+ at 2.9). Aso of note, chronic kidney disease(BUN/Cr at 80/4.7), nearing dialysis, Renal Consult. Printed H&P and Consult notes for Dr Libby Ceron to review. Will close above transitions of care episode, change to complex case management and continue to monitor for discharge. See Previous NN/Patient Outreach Documentation:   Patient kept PULM F/U appt, 2/28/17 and is being sent back to ED/further medical evaluation. S/P [facility], 2/16/17 - 2/20/17 related to CHF Exacerbation. Patient kept F/U appt with Dr Libby Ceron, 2/23/17, next scheduled appt, 3/9/17, to see PULM, 2/28/17. S/P [facility], 2/2/17 - 2/10/17 related to Resp. Failure/Interstitial Lung Disease, DVT, Diastolic HF, PULM HTN and CKD. 2/16/17, Patient in office to establish PCP with Dr Libby Ceron, reports recent [facility] hospitalization and ED visit. Dr Libby Ceron contacted this writer/NN with concerns of 30 pound weight gain since discharge, INR at 5. 4(recent DVT on coumadin), Diabetes, CHF,and CKD, recommends patient return to [facility] for further medical evaluation

## 2017-04-04 NOTE — Clinical Note
Jessica Neol to report, she is currently at [facility]---- [facility]----Patient S/P 2/28/17 - 3/10/17 at [facility] related to Pneumonia/COPD Exacerbation. Patient kept F/U appt with Dr Kamala Reeves, 3/15/17 and Dr Ewelina Whiteside, 3/22/17. Also kept scheduled appt with Dr Kamala Reeves, 3/29/17 at 2:30pm 4/4/17, Per chart review/this writer/NN and [facility] access, patient has required further hospitalization, admitted, 4/2/17 to [facility] related to chest pain and severe Hypokalemia(K+ at 2.9). Aso of note, chronic kidney disease(BUN/Cr at 80/4.7), nearing dialysis, Renal Consult.  Thanks, Berhane Dueñas

## 2017-04-07 ENCOUNTER — PATIENT OUTREACH (OUTPATIENT)
Dept: FAMILY MEDICINE CLINIC | Age: 62
End: 2017-04-07

## 2017-04-10 ENCOUNTER — PATIENT OUTREACH (OUTPATIENT)
Dept: FAMILY MEDICINE CLINIC | Age: 62
End: 2017-04-10

## 2017-04-10 NOTE — PROGRESS NOTES
4/10/17, This writer/NN and [facility] access, able to confirm, patient remains at [facility].  Printed Consult Report(Dr. Stanislav Miller related to Renal Cysts, recommends patient to F/U with Urology outpatient) and recent progress note for Dr Rachelle Mccoy to review. Will continue to monitor for discharge. See Previous NN/Patient Outreach Documentation:  4/7/17, This writer/NN and [facility] access, able to confirm, patient remains inpatient at [facility](admitted 4/2/17 related to SOB/chest pain). Patient also ESRD, need for dialysis access, 4/6/17, Left Radiocephalic AV Fistula by Dr Prem Seymour. Printed H&P, Consult Note, Recent Progress Note and Operative Report for Dr Rachelle Mccoy to review. Will continue to monitor for discharge. See Previous NN/Patient Outreach Documentation:  Patient S/P 2/28/17 - 3/10/17 at [facility] related to Pneumonia/COPD Exacerbation. Patient kept F/U appt with Dr Rachelle Mccoy, 3/15/17 and Dr Meenu Abrams, 3/22/17. Also kept scheduled appt with Dr Rachelle Mccoy, 3/29/17 at 2:30pm  4/4/17, Per chart review/this writer/NN and [facility] access, patient has required further hospitalization, admitted, 4/2/17 to [facility] related to chest pain and severe Hypokalemia(K+ at 2.9). Aso of note, chronic kidney disease(BUN/Cr at 80/4.7), nearing dialysis, Renal Consult. See Previous NN/Patient Outreach Documentation:   Patient kept PULM F/U appt, 2/28/17 and is being sent back to ED/further medical evaluation. S/P [facility], 2/16/17 - 2/20/17 related to CHF Exacerbation. Patient kept F/U appt with Dr Rachelle Mccoy, 2/23/17, next scheduled appt, 3/9/17, to see PULM, 2/28/17. S/P [facility], 2/2/17 - 2/10/17 related to Resp. Failure/Interstitial Lung Disease, DVT, Diastolic HF, PULM HTN and CKD. 2/16/17, Patient in office to establish PCP with Dr Rachelle Mccoy, reports recent [facility] hospitalization and ED visit. Dr Rachelle Mccoy contacted this writer/NN with concerns of 30 pound weight gain since discharge, INR at 5. 4(recent DVT on coumadin), Diabetes, CHF,and CKD, recommends patient return to [facility] for further medical evaluation

## 2017-04-14 ENCOUNTER — DOCUMENTATION ONLY (OUTPATIENT)
Dept: FAMILY MEDICINE CLINIC | Age: 62
End: 2017-04-14

## 2017-04-14 ENCOUNTER — PATIENT OUTREACH (OUTPATIENT)
Dept: FAMILY MEDICINE CLINIC | Age: 62
End: 2017-04-14

## 2017-04-14 NOTE — PROGRESS NOTES
4/14/17, This writer/NN and [facility] access, able to confirm,patient hospitalized, 4/2/17 - 4/11/17 at [facility] related to Chest Pain and known Interstitial Lung Disease. Printed Discharge Summary for Dr Alex Astorga to review. Per Discharge Summary:  Cardiology Work-up, Serial Cardiac Enzymes, ECHO(EF 83-21%, Grade I Diastolic Dysfunction), and negative stress test, cleared for discharge. PULM, continue outpatient F/U, ICS(Dulera 2 puffs, twice daily) and home medications. CKD, AV Fistula placent(4/6/17, Left Radiocephalic AV Fistula by Dr Eli Hutchinson), F/U with Nephrology. H/O DVT/Anticoagulation, Discharged on Coumadin, INR at discharge 2.3  Urology, CT complex renal cyst and renal ultrasound with bilateral complex cyst.  Patient also receiving Ergocalciferol(Tuesday and Thursday). Patient instructed to F/U with PCP for INR, CBC and renal profile. Also to have PULM and Renal F/U appts. Patient instructed to follow 1.5 L/day fluid restriction. Patient S/P 2/28/17 - 3/10/17 at [facility] related to Pneumonia/COPD Exacerbation. Patient kept F/U appt with Dr Alex Astorga, 3/15/17 and Dr Jacqui Stokes, 3/22/17. Also kept scheduled appt with Dr Alex Astorga, 3/29/17 at 2:30pm  4/4/17, Per chart review/this writer/NN and [facility] access, patient has required further hospitalization, admitted, 4/2/17 to [facility] related to chest pain and severe Hypokalemia(K+ at 2.9). Aso of note, chronic kidney disease(BUN/Cr at 80/4.7), nearing dialysis, Renal Consult. See Previous NN/Patient Outreach Documentation:   S/P [facility], 2/16/17 - 2/20/17 related to CHF Exacerbation. Patient kept F/U appt with Dr Alex Astorga, 2/23/17, next scheduled appt, 3/9/17, to see PULM, 2/28/17. Patient kept PULM F/U appt, 2/28/17 and is being sent back to ED/further medical evaluation. S/P [facility], 2/2/17 - 2/10/17 related to Resp. Failure/Interstitial Lung Disease, DVT, Diastolic HF, PULM HTN and CKD.   2/16/17, Patient in office to establish PCP with Dr Alex Astorga, reports recent [facility] hospitalization and ED visit. Dr Idalmis Salomon contacted this writer/NN with concerns of 30 pound weight gain since discharge, INR at 5. 4(recent DVT on coumadin), Diabetes, CHF,and CKD, recommends patient return to [facility] for further medical evaluation

## 2017-04-14 NOTE — PROGRESS NOTES
Pt called requesting an appt for a INR check, she stated that she got out of the hospital on Tuesday 4/11/17. Pt asked for a appt for as soon as possible, I offered her today 4/14/17 @ 3:45 with Dr. Vish Alva, pt stated that she was across town and could not make it at that time, so I continued to check  schedule, she stated nevermind and that she would tell the hospital that I refused to give her a appt for her INR check and hung up the phone.

## 2017-04-17 ENCOUNTER — OFFICE VISIT (OUTPATIENT)
Dept: CARDIOLOGY CLINIC | Age: 62
End: 2017-04-17

## 2017-04-17 VITALS
OXYGEN SATURATION: 98 % | HEIGHT: 70 IN | WEIGHT: 293 LBS | SYSTOLIC BLOOD PRESSURE: 120 MMHG | DIASTOLIC BLOOD PRESSURE: 78 MMHG | HEART RATE: 83 BPM | BODY MASS INDEX: 41.95 KG/M2

## 2017-04-17 DIAGNOSIS — E55.9 VITAMIN D DEFICIENCY: ICD-10-CM

## 2017-04-17 DIAGNOSIS — E11.22 TYPE 2 DIABETES MELLITUS WITH STAGE 3 CHRONIC KIDNEY DISEASE, UNSPECIFIED LONG TERM INSULIN USE STATUS: Chronic | ICD-10-CM

## 2017-04-17 DIAGNOSIS — I20.9 ANGINA, CLASS III (HCC): ICD-10-CM

## 2017-04-17 DIAGNOSIS — Z99.89 OSA ON CPAP: Chronic | ICD-10-CM

## 2017-04-17 DIAGNOSIS — I25.10 ATHEROSCLEROSIS OF NATIVE CORONARY ARTERY OF NATIVE HEART WITHOUT ANGINA PECTORIS: Chronic | ICD-10-CM

## 2017-04-17 DIAGNOSIS — N18.4 CKD (CHRONIC KIDNEY DISEASE) STAGE 4, GFR 15-29 ML/MIN (HCC): Chronic | ICD-10-CM

## 2017-04-17 DIAGNOSIS — N18.3 TYPE 2 DIABETES MELLITUS WITH STAGE 3 CHRONIC KIDNEY DISEASE, UNSPECIFIED LONG TERM INSULIN USE STATUS: Chronic | ICD-10-CM

## 2017-04-17 DIAGNOSIS — I50.32 CHRONIC DIASTOLIC HEART FAILURE (HCC): Primary | Chronic | ICD-10-CM

## 2017-04-17 DIAGNOSIS — E66.01 MORBID OBESITY, UNSPECIFIED OBESITY TYPE (HCC): Chronic | ICD-10-CM

## 2017-04-17 DIAGNOSIS — J84.9 INTERSTITIAL LUNG DISEASE (HCC): Chronic | ICD-10-CM

## 2017-04-17 DIAGNOSIS — G47.33 OSA ON CPAP: Chronic | ICD-10-CM

## 2017-04-17 DIAGNOSIS — I27.20 PULMONARY HTN (HCC): Chronic | ICD-10-CM

## 2017-04-17 DIAGNOSIS — I82.4Y9 DEEP VEIN THROMBOSIS (DVT) OF PROXIMAL LOWER EXTREMITY, UNSPECIFIED CHRONICITY, UNSPECIFIED LATERALITY (HCC): ICD-10-CM

## 2017-04-17 DIAGNOSIS — I10 ESSENTIAL HYPERTENSION: Chronic | ICD-10-CM

## 2017-04-17 NOTE — PROGRESS NOTES
Visit Vitals    /78 (BP 1 Location: Right arm, BP Patient Position: Sitting)    Pulse 83    Ht 5' 10\" (1.778 m)    Wt 319 lb 12.8 oz (145.1 kg)    SpO2 98%    BMI 45.89 kg/m2   verbal per dr Nadir Reno to discontinue meds off list

## 2017-04-17 NOTE — PROGRESS NOTES
History of Present Illness    Adams Pina is a 63 y.o. female. Last seen 6 months ago.      Problem List  Date Reviewed: 3/29/2017          Codes Class Noted    Acute exacerbation of chronic obstructive pulmonary disease (COPD) ([facility] ICD-10-CM: J44.1  ICD-9-CM: 491.21  3/1/2017        HCAP (healthcare-associated pneumonia) ICD-10-CM: J18.9  ICD-9-CM: 482  2/28/2017        CKD (chronic kidney disease) stage 4, GFR 15-29 ml/min (HCC) (Chronic) ICD-10-CM: N18.4  ICD-9-CM: 585.4  2/10/2017        Acute and chronic respiratory failure with hypoxia (HCC) ICD-10-CM: J96.21  ICD-9-CM: 518.84, 799.02  2/10/2017        DVT (deep venous thrombosis) ([facility] ICD-10-CM: I82.409  ICD-9-CM: 453.40  2/2/2017        DM (diabetes mellitus), type 2 with renal complications (HCC) (Chronic) ICD-10-CM: E11.29  ICD-9-CM: 250.40  8/9/2013        Vitamin D deficiency ICD-10-CM: E55.9  ICD-9-CM: 268.9  8/9/2013        Angina, class III ([facility] ICD-10-CM: I20.9  ICD-9-CM: 413.9  8/9/2013        Normocytic anemia (Chronic) ICD-10-CM: D64.9  ICD-9-CM: 285.9  5/26/2013        DJD (degenerative joint disease) of knee ICD-10-CM: M17.10  ICD-9-CM: 715.36  10/30/2012        Chronic diastolic heart failure (HCC) (Chronic) ICD-10-CM: I50.32  ICD-9-CM: 428.32  10/5/2012        HTN (hypertension) (Chronic) ICD-10-CM: I10  ICD-9-CM: 401.9  10/1/2012        Morbid obesity (Chronic) ICD-10-CM: E66.01  ICD-9-CM: 278.01  10/1/2012        Esophageal reflux ICD-10-CM: K21.9  ICD-9-CM: 530.81  10/1/2012        Gastroparesis ICD-10-CM: K31.84  ICD-9-CM: 536.3  10/1/2012        Pulmonary HTN (HCC) (Chronic) ICD-10-CM: I27.2  ICD-9-CM: 416.8  3/21/2012        Interstitial lung disease ([address] (Chronic) ICD-10-CM: J84.9  ICD-9-CM: 693  2/28/2011        CAD (coronary Artery Disease) Native Artery (Chronic) ICD-10-CM: I25.10  ICD-9-CM: 414.01  2/16/2011    Overview Addendum 10/5/2012  7:33 AM by PRASHANT Sidhu 2004  1v CAD by cath - PCI OM with SAL  Cath 5/2004 - patent stent, no new disease  Lexiscan cardiolite 12/09- normal perfusion, EF 66%  Cath: 3/19/12: PA 55/30 mean 41, wedge 26, RA 24, EDP 35, LVG 55%, LM normal, LAD normal, LCX - OM1 patent stent, OM2 prox 85% long, % with L to R collaterals                JASEN on CPAP (Chronic) ICD-10-CM: G47.33, Z99.89  ICD-9-CM: 327.23, V46.8  2/16/2011    Overview Signed 3/21/2012  8:04 AM by [employer] , [facility]     Dr. Marquis Youssef CPAP                   Cardiac Testing  CATH: 2004: 1v CAD by cath - PCI OM with SAL  CATH 5/2004 - patent stent, no new disease   Lexiscan cardiolite 12/09- normal perfusion, EF 66%  ECHO 11/2009: LVH, EF 99%, diastolic dysfunction  STRESS/LEXISCAN/CARDIOLITE 3/29/11: normal perfusion, EF 62%  CATH: 3/20/2012 :PA 55/30 mean 41, wedge 26, RA 24, EDP 35, LVG 55%, LM normal, LAD normal, LCX - OM1 patent stent, OM2 prox 85% long, % w/ L -> R collateral; s/p SAL-> OM2/OM3 with SAL. CATH: 10/4/2012: EDP 25, EF 55%, LM nl, LAD mild plaque, LCX patent OM stents, % prox with good L to R collaterals. Cath 3/18/2014 - RA 9 PA 42/19/29, PCW 12, EDP 25, no LVG due to CKD, LM nl, LAD mild plaque, LCX patent stents OM1/OM2, RCA chronic proximal occlusion with L to R collaterals. ECHO 4/11/16: EF 60-65%. No WMA. LA mildly dilated. Lexiscan Cardiolite 4/11/16: Normal. LVEF 55%. Compared to 3/29/11, no significant changes. HPI    Mrs. Des Pack was hospitalized from 02/28-03/10/17. She continues to struggle with esophageal issues related to GERD. She describes dysphagia at times. She does not take any medications for resolve as they usually resolve with time. Coumadin was reduced to 1mg daily. She is normotensive at home however she reports intermittent episodes of dizziness and lightheadedness without syncope or near-syncope. She has noted a marked worsening in her dyspnea over the past 6 months.  Underwent [facility] prior to left AV fistula replacement and was told that was normal however no report available. She takes Aspirin 81mg daily. No bleeding issues on Coumadin. Patient denies any exertional chest pain, palpitations, syncope, orthopnea or paroxysmal nocturnal dyspnea. Current Outpatient Prescriptions on File Prior to Visit   Medication Sig Dispense Refill    magnesium oxide (MAG-OX) 400 mg tablet Take 1 Tab by mouth two (2) times a day. 60 Tab 0    polyethylene glycol (MIRALAX) 17 gram packet Take 17 g by mouth daily as needed.  warfarin (COUMADIN) 2 mg tablet Take 1 mg by mouth daily.  senna (SENNA) 8.6 mg tablet Take 2 Tabs by mouth nightly.  docusate sodium (COLACE) 100 mg capsule Take 100 mg by mouth nightly.  insulin aspart protamine/insulin aspart (NOVOLOG MIX 70-30) 100 unit/mL (70-30) injection by SubCUTAneous route. Uses sliding scale      tiotropium-olodaterol (STIOLTO RESPIMAT) 2.5-2.5 mcg/actuation mist Take 2 Puffs by inhalation Daily (before lunch). Uses daily at 11 am  1 Inhaler 1    oxyCODONE-acetaminophen (PERCOCET) 5-325 mg per tablet Take 1 Tab by mouth every eight (8) hours as needed for Pain. Max Daily Amount: 3 Tabs. (Patient taking differently: Take 1 Tab by mouth as needed for Pain.) 90 Tab 0    bumetanide (BUMEX) 2 mg tablet Take 1 Tab by mouth two (2) times a day. (Patient taking differently: Take 1 mg by mouth two (2) times a day.) 60 Tab 0    ondansetron (ZOFRAN ODT) 4 mg disintegrating tablet Take 1 Tab by mouth every six (6) hours as needed for Nausea. (Patient taking differently: Take 4 mg by mouth as needed for Nausea.) 30 Tab 0    albuterol-ipratropium (DUONEB) 2.5 mg-0.5 mg/3 ml nebu 3 mL by Nebulization route daily as needed. Takes about 4 days a week      sucralfate (CARAFATE) 1 gram tablet Take 1 g by mouth Before breakfast, lunch, dinner and at bedtime.  fluticasone (FLONASE) 50 mcg/actuation nasal spray 2 Sprays by Both Nostrils route nightly as needed.       calcitRIOL (ROCALTROL) 0.25 mcg capsule Take 0.25 mcg by mouth two (2) days a week. Patient takes on Monday and Thursday      isosorbide mononitrate ER (IMDUR) 120 mg CR tablet TAKE 1 TABLET BY MOUTH EVERY DAY 30 Tab 5    carvedilol (COREG) 25 mg tablet TAKE 1 TABLET BY MOUTH TWICE A  Tab 3    pantoprazole (PROTONIX) 40 mg tablet Take 1 Tab by mouth daily. Indications: GASTROESOPHAGEAL REFLUX 90 Tab 1    nitroglycerin (NITROSTAT) 0.3 mg SL tablet 1 Tab by SubLINGual route every five (5) minutes as needed for Chest Pain. 1 Bottle 1    ergocalciferol (VITAMIN D2) 50,000 unit capsule Take 50,000 Units by mouth every Tuesday and Thursday.  aspirin 81 mg tablet Take 81 mg by mouth daily.  atorvastatin (LIPITOR) 80 mg tablet Take 80 mg by mouth every evening.  warfarin (COUMADIN) 2 mg tablet Take 2 mg by mouth five (5) days a week. Tues, Wed, Thurs, Sa, Perry       No current facility-administered medications on file prior to visit. Allergies   Allergen Reactions    Contrast Dye [Iodine] Anaphylaxis    Levaquin [Levofloxacin] Nausea and Vomiting    Morphine Hives and Itching     Lives in [place]. Review of Systems  Constitutional: Negative for fever, chills, malaise/fatigue and diaphoresis. Respiratory: Negative for cough, hemoptysis, sputum production, and wheezing. Positive for shortness of breath. Cardiovascular: Negative for chest pain, palpitations, orthopnea, claudication, and PND. Positive for LE edema. Gastrointestinal: Negative for nausea, vomiting, blood in stool and melena. Positive for dysphagia. Genitourinary: Negative for dysuria and flank pain. Musculoskeletal: Negative for joint pain. Positive for back pain. Skin: Negative for rash. Neurological: Negative for focal weakness, seizures, loss of consciousness, weakness and headaches. Positive for dizziness or lightheadedness. Endo/Heme/Allergies: Does not bruise/bleed easily.   Psychiatric/Behavioral: Negative for memory loss. The patient does not have insomnia. Visit Vitals    /78 (BP 1 Location: Right arm, BP Patient Position: Sitting)    Pulse 83    Ht 5' 10\" (1.778 m)    Wt 319 lb 12.8 oz (145.1 kg)    SpO2 98%    BMI 45.89 kg/m2     Wt Readings from Last 3 Encounters:   04/17/17 319 lb 12.8 oz (145.1 kg)   03/29/17 319 lb (144.7 kg)   03/15/17 327 lb (148.3 kg)       Physical Exam  General - well developed well nourished  Neck - JVP normal, thyroid normal  Cardiac - normal S1,S2, no murmurs, rubs or gallops. No clicks  Vascular - carotids without bruits, radials, femorals and pedal pulses equal bilateral  Lungs - clear to auscultation bilaterals, no rales, wheezing or rhonchi  Abd - soft nontender, no HSM, no abd bruits  Extremities - 2-3+ pedal and pretibial edema  Skin - no rash  Neuro - nonfocal  Psych - normal mood and affect    Cardiographics  EKG 2/14 - Normal sinus rhythm, low voltage, otherwise normal EKG  Echo 02/04/17- LVEF 75%     ASSESSMENT and PLAN  Encounter Diagnoses   Name Primary?  Chronic diastolic heart failure (HCC) Yes    Essential hypertension     Atherosclerosis of native coronary artery of native heart without angina pectoris     Deep vein thrombosis (DVT) of proximal lower extremity, unspecified chronicity, unspecified laterality (HCC)     Angina, class III (HCC)     CKD (chronic kidney disease) stage 4, GFR 15-29 ml/min ([facility]     Type 2 diabetes mellitus with stage 3 chronic kidney disease, unspecified long term insulin use status     Morbid obesity, unspecified obesity type     Pulmonary HTN ([address]     Interstitial lung disease ([address]     JASEN on CPAP     Vitamin D deficiency      Mrs. Emeka Gutierrez has chronic diastolic HF in the setting of morbid obesity, HTN, CAD, JASEN and advanced CKD. She also has interstitial lung disease. She has class 3-4 symptoms on continuous Oxygen. Her volume status seems to be fine.  Recent stress nuclear study was apparently normal. I believe most of her dyspnea is due to lung disease. Given her orthostatic type symptoms, will reduce Imdur to 60mg daily. Consider reducing Carvedilol if her dizziness persists. INR checked today- 2.2. Routine anticoagulation monitored by David Dominguez DO. Follow-up Disposition:  Return in about 4 months (around 8/17/2017).      Written by Elen Carey, as dictated by Ole Yanez MD.   Ole Yanez MD

## 2017-04-17 NOTE — MR AVS SNAPSHOT
Visit Information Date & Time Provider Department Dept. Phone Encounter #  
 4/17/2017  3:40 PM Willian Benito MD [facility] Zonia Schmid [phone] [id number] Follow-up Instructions Return in about 4 months (around 8/17/2017). Your Appointments 4/27/2017 11:30 AM  
ESTABLISHED PATIENT with Baljinder Levine, DO [address] ([facility]) Appt Note: 1 mo fuv  
 [address] [address] [address]  
[phone]  
  
   
 [address] [address] Upcoming Health Maintenance Date Due  
 FOOT EXAM Q1 12/14/1967 MICROALBUMIN Q1 12/14/1967 EYE EXAM RETINAL OR DILATED Q1 12/14/1967 Pneumococcal 19-64 Medium Risk (1 of 1 - PPSV23) 12/14/1976 DTaP/Tdap/Td series (1 - Tdap) 12/14/1978 PAP AKA CERVICAL CYTOLOGY 12/14/1978 BREAST CANCER SCRN MAMMOGRAM 12/14/2007 LIPID PANEL Q1 11/9/2015 INFLUENZA AGE 9 TO ADULT 8/1/2016 HEMOGLOBIN A1C Q6M 9/3/2017 FOBT Q 1 YEAR AGE 50-75 2/18/2018 Allergies as of 4/17/2017  Review Complete On: 4/17/2017 By: Sasha York  
  
 Severity Noted Reaction Type Reactions Contrast Dye [Iodine] High 02/28/2011   Systemic Anaphylaxis Levaquin [Levofloxacin]  10/13/2013    Nausea and Vomiting Morphine  02/28/2011   Side Effect Hives, Itching Current Immunizations  Never Reviewed No immunizations on file. Not reviewed this visit Vitals BP Pulse Height(growth percentile) Weight(growth percentile) SpO2 BMI  
 120/78 (BP 1 Location: Right arm, BP Patient Position: Sitting) 83 5' 10\" (1.778 m) 319 lb 12.8 oz (145.1 kg) 98% 45.89 kg/m2 OB Status Smoking Status Hysterectomy Former Smoker Vitals History BMI and BSA Data Body Mass Index Body Surface Area 45.89 kg/m 2 2.68 m 2 Preferred Pharmacy Pharmacy Name Phone [id number] [address] [phone] Your Updated Medication List  
  
   
This list is accurate as of: 4/17/17  4:17 PM.  Always use your most recent med list.  
  
  
  
  
 aspirin 81 mg tablet Take 81 mg by mouth daily. atorvastatin 80 mg tablet Commonly known as:  LIPITOR Take 80 mg by mouth every evening. bumetanide 2 mg tablet Commonly known as:  Alyse Joaquin Take 1 Tab by mouth two (2) times a day. calcitRIOL 0.25 mcg capsule Commonly known as:  ROCALTROL Take 0.25 mcg by mouth two (2) days a week. Patient takes on Monday and Thursday CARAFATE 1 gram tablet Generic drug:  sucralfate Take 1 g by mouth Before breakfast, lunch, dinner and at bedtime. carvedilol 25 mg tablet Commonly known as:  COREG  
TAKE 1 TABLET BY MOUTH TWICE A DAY  
  
 docusate sodium 100 mg capsule Commonly known as:  Nola Ehrich Take 100 mg by mouth nightly. DUONEB 2.5 mg-0.5 mg/3 ml Nebu Generic drug:  albuterol-ipratropium 3 mL by Nebulization route daily as needed. Takes about 4 days a week FLONASE 50 mcg/actuation nasal spray Generic drug:  fluticasone 2 Sprays by Both Nostrils route nightly as needed. isosorbide mononitrate  mg CR tablet Commonly known as:  IMDUR  
TAKE 1 TABLET BY MOUTH EVERY DAY  
  
 magnesium oxide 400 mg tablet Commonly known as:  MAG-OX Take 1 Tab by mouth two (2) times a day. MIRALAX 17 gram packet Generic drug:  polyethylene glycol Take 17 g by mouth daily as needed. nitroglycerin 0.3 mg SL tablet Commonly known as:  NITROSTAT  
1 Tab by SubLINGual route every five (5) minutes as needed for Chest Pain. NovoLOG Mix 70-30 100 unit/mL (70-30) injection Generic drug:  insulin aspart protamine/insulin aspart  
by SubCUTAneous route. Uses sliding scale  
  
 ondansetron 4 mg disintegrating tablet Commonly known as:  ZOFRAN ODT Take 1 Tab by mouth every six (6) hours as needed for Nausea. oxyCODONE-acetaminophen 5-325 mg per tablet Commonly known as:  PERCOCET Take 1 Tab by mouth every eight (8) hours as needed for Pain. Max Daily Amount: 3 Tabs. pantoprazole 40 mg tablet Commonly known as:  PROTONIX Take 1 Tab by mouth daily. Indications: GASTROESOPHAGEAL REFLUX Senna 8.6 mg tablet Generic drug:  senna Take 2 Tabs by mouth nightly. STIOLTO RESPIMAT 2.5-2.5 mcg/actuation Mist  
Generic drug:  tiotropium-olodaterol Take 2 Puffs by inhalation Daily (before lunch). Uses daily at 11 am  
  
 VITAMIN D2 50,000 unit capsule Generic drug:  ergocalciferol Take 50,000 Units by mouth every Tuesday and Thursday. * warfarin 2 mg tablet Commonly known as:  COUMADIN Take 1 mg by mouth daily. * warfarin 2 mg tablet Commonly known as:  COUMADIN Take 2 mg by mouth five (5) days a week. Sabiha Hitesh, Wed, Thurs, Sa, Perry  
  
 * Notice: This list has 2 medication(s) that are the same as other medications prescribed for you. Read the directions carefully, and ask your doctor or other care provider to review them with you. Follow-up Instructions Return in about 4 months (around 8/17/2017). Patient Instructions Reduce Isosorbide to half tablet daily. Introducing [facility]! Dear [facility]: Thank you for requesting a [employer] account. Our records indicate that you already have an active [employer] account. You can access your account anytime at [email or web address] Did you know that you can access your hospital and ER discharge instructions at any time in [employer]? You can also review all of your test results from your hospital stay or ER visit. Additional Information If you have questions, please visit the Frequently Asked Questions section of the [employer] website at [email or web address] Remember, [employer] is NOT to be used for urgent needs. For medical emergencies, dial 911. Now available from your iPhone and Android! Please provide this summary of care documentation to your next provider. Your primary care clinician is listed as Blade Dimas. If you have any questions after today's visit, please call [phone].

## 2017-04-22 RX ORDER — BUMETANIDE 1 MG/1
1 TABLET ORAL 2 TIMES DAILY
Qty: 60 TAB | Refills: 3 | Status: SHIPPED | OUTPATIENT
Start: 2017-04-22 | End: 2017-05-10 | Stop reason: SDUPTHER

## 2017-05-04 ENCOUNTER — TELEPHONE (OUTPATIENT)
Dept: CARDIOLOGY CLINIC | Age: 62
End: 2017-05-04

## 2017-05-04 NOTE — TELEPHONE ENCOUNTER
Patient states she will be riding to [place]. This is typically a 5 hour drive. Discussed signs and symptoms to monitor for. She states she will make frequent stops to get out and walk.

## 2017-05-04 NOTE — TELEPHONE ENCOUNTER
Please call Mrs. Gonsales at [phone]. She's had a death in her family and needs to know if it's ok for her to travel. She'll be leaving this Sunday, 5/7/17.      Thank you, [employer]

## 2017-05-10 ENCOUNTER — OFFICE VISIT (OUTPATIENT)
Dept: FAMILY MEDICINE CLINIC | Age: 62
End: 2017-05-10

## 2017-05-10 VITALS
OXYGEN SATURATION: 99 % | HEART RATE: 82 BPM | TEMPERATURE: 98.2 F | HEIGHT: 70 IN | SYSTOLIC BLOOD PRESSURE: 132 MMHG | DIASTOLIC BLOOD PRESSURE: 62 MMHG | RESPIRATION RATE: 22 BRPM | WEIGHT: 293 LBS | BODY MASS INDEX: 41.95 KG/M2

## 2017-05-10 DIAGNOSIS — E16.2 HYPOGLYCEMIA: ICD-10-CM

## 2017-05-10 DIAGNOSIS — I82.4Y9 DEEP VEIN THROMBOSIS (DVT) OF PROXIMAL LOWER EXTREMITY, UNSPECIFIED CHRONICITY, UNSPECIFIED LATERALITY (HCC): ICD-10-CM

## 2017-05-10 DIAGNOSIS — I50.32 CHRONIC DIASTOLIC HEART FAILURE (HCC): Primary | Chronic | ICD-10-CM

## 2017-05-10 DIAGNOSIS — M17.10 ARTHRITIS OF KNEE: ICD-10-CM

## 2017-05-10 DIAGNOSIS — Z51.81 MEDICATION MONITORING ENCOUNTER: ICD-10-CM

## 2017-05-10 LAB
GLUCOSE POC: 50 MG/DL
GLUCOSE POC: 72 MG/DL

## 2017-05-10 RX ORDER — WARFARIN 2 MG/1
2 TABLET ORAL DAILY
Qty: 30 TAB | Refills: 5 | Status: SHIPPED | OUTPATIENT
Start: 2017-05-10 | End: 2017-05-17

## 2017-05-10 RX ORDER — BUMETANIDE 1 MG/1
TABLET ORAL
Qty: 90 TAB | Refills: 3
Start: 2017-05-10 | End: 2017-10-13

## 2017-05-10 RX ORDER — OXYCODONE AND ACETAMINOPHEN 5; 325 MG/1; MG/1
1 TABLET ORAL
Qty: 90 TAB | Refills: 0 | Status: SHIPPED | OUTPATIENT
Start: 2017-05-10 | End: 2017-05-10

## 2017-05-10 NOTE — PATIENT INSTRUCTIONS

## 2017-05-10 NOTE — PROGRESS NOTES
Mainor Brandt is a 63 y.o. female   Chief Complaint   Patient presents with    Coagulation disorder    pt states she has been retaining fluid, recent Cr was 3.2 per pt last week. Will increase dose to 2mg Qam and 1 in afternoon. Pt would also like papers completed for medicare in order to get a scooter motorized since she is not able to ambulate much due to knee arthritis. Pt also requesting a refill of her pain medicaton. Pain in knee is 8/10 in R knee. Pt has tried gabapentin and does not tolerate. Can not use nsaids given renal status. Same with duloxetine. Last pain med was Sunday.  checked, unable to leave sample no Rx at this time. INR is 1.2mg. Increase to 2mg daily. Pt became a little dizzy on way back from bathroom and glucose checked at 50 and then given 8 oz of juice and pt feeling better recheck at 71 and  is going to take her to get some food advised to not use insulin today and restart tomorrow if eating normal diet. she is a 63y.o. year old female who presents for evalution. Reviewed PmHx, RxHx, FmHx, SocHx, AllgHx and updated and dated in the chart. Review of Systems - negative except as listed above in the HPI    Objective:     Vitals:    05/10/17 1529   BP: 132/62   Pulse: 82   Resp: 22   Temp: 98.2 °F (36.8 °C)   TempSrc: Oral   SpO2: 99%   Weight: 336 lb 6.4 oz (152.6 kg)   Height: 5' 10\" (1.778 m)       Current Outpatient Prescriptions   Medication Sig    bumetanide (BUMEX) 1 mg tablet 2 tab PO Qam and 1 tab Qafternoon    warfarin (COUMADIN) 2 mg tablet Take 1 Tab by mouth daily.  magnesium oxide (MAG-OX) 400 mg tablet Take 1 Tab by mouth two (2) times a day.  polyethylene glycol (MIRALAX) 17 gram packet Take 17 g by mouth daily as needed.  senna (SENNA) 8.6 mg tablet Take 2 Tabs by mouth nightly.  docusate sodium (COLACE) 100 mg capsule Take 100 mg by mouth nightly.     insulin aspart protamine/insulin aspart (NOVOLOG MIX 70-30) 100 unit/mL (70-30) injection by SubCUTAneous route. Uses sliding scale    tiotropium-olodaterol (STIOLTO RESPIMAT) 2.5-2.5 mcg/actuation mist Take 2 Puffs by inhalation Daily (before lunch). Uses daily at 11 am     albuterol-ipratropium (DUONEB) 2.5 mg-0.5 mg/3 ml nebu 3 mL by Nebulization route daily as needed. Takes about 4 days a week    sucralfate (CARAFATE) 1 gram tablet Take 1 g by mouth Before breakfast, lunch, dinner and at bedtime.  fluticasone (FLONASE) 50 mcg/actuation nasal spray 2 Sprays by Both Nostrils route nightly as needed.  calcitRIOL (ROCALTROL) 0.25 mcg capsule Take 0.25 mcg by mouth two (2) days a week. Patient takes on Monday and Thursday    isosorbide mononitrate ER (IMDUR) 120 mg CR tablet TAKE 1 TABLET BY MOUTH EVERY DAY    carvedilol (COREG) 25 mg tablet TAKE 1 TABLET BY MOUTH TWICE A DAY    pantoprazole (PROTONIX) 40 mg tablet Take 1 Tab by mouth daily. Indications: GASTROESOPHAGEAL REFLUX    nitroglycerin (NITROSTAT) 0.3 mg SL tablet 1 Tab by SubLINGual route every five (5) minutes as needed for Chest Pain.  ergocalciferol (VITAMIN D2) 50,000 unit capsule Take 50,000 Units by mouth every Tuesday and Thursday.  aspirin 81 mg tablet Take 81 mg by mouth daily.  atorvastatin (LIPITOR) 80 mg tablet Take 80 mg by mouth every evening.  ondansetron (ZOFRAN ODT) 4 mg disintegrating tablet Take 1 Tab by mouth every six (6) hours as needed for Nausea. (Patient taking differently: Take 4 mg by mouth as needed for Nausea.)     No current facility-administered medications for this visit.         Physical Examination: General appearance - alert, well appearing, and in no distress  Eyes - pupils equal and reactive, extraocular eye movements intact  Chest - clear to auscultation, no wheezes, rales or rhonchi, symmetric air entry  Heart - normal rate, regular rhythm, normal S1, S2, no murmurs, rubs, clicks or gallops  Extremities - pedal edema 1 +      Assessment/ Plan: Rajani Helms was seen today for coagulation disorder. Diagnoses and all orders for this visit:    Chronic diastolic heart failure (HCC)  -     bumetanide (BUMEX) 1 mg tablet; 2 tab PO Qam and 1 tab Qafternoon    Arthritis of knee  Will need pain contract and utox prior to Rx'ing any more narcotics, pt will RTC in 1 week since sample today was too small to test.  Deep vein thrombosis (DVT) of proximal lower extremity, unspecified chronicity, unspecified laterality (HCC)  -     warfarin (COUMADIN) 2 mg tablet; Take 1 Tab by mouth daily.  -     Discontinue: oxyCODONE-acetaminophen (PERCOCET) 5-325 mg per tablet; Take 1 Tab by mouth every eight (8) hours as needed for Pain. Max Daily Amount: 3 Tabs. Medication monitoring encounter  -     Cancel: AMB POC ALERE ICUP DX DRUG SCREEN 10    Hypoglycemia  -     AMB POC GLUCOSE BLOOD, BY GLUCOSE MONITORING DEVICE  -     AMB POC GLUCOSE BLOOD, BY GLUCOSE MONITORING DEVICE       Follow-up Disposition:  Return in about 1 week (around 5/17/2017), or if symptoms worsen or fail to improve. I have discussed the diagnosis with the patient and the intended plan as seen in the above orders. The patient has received an after-visit summary and questions were answered concerning future plans. Pt conveyed understanding of plan.     Medication Side Effects and Warnings were discussed with patient      Olga Boston DO

## 2017-05-17 ENCOUNTER — OFFICE VISIT (OUTPATIENT)
Dept: FAMILY MEDICINE CLINIC | Age: 62
End: 2017-05-17

## 2017-05-17 VITALS
OXYGEN SATURATION: 99 % | DIASTOLIC BLOOD PRESSURE: 74 MMHG | TEMPERATURE: 97.8 F | WEIGHT: 293 LBS | HEIGHT: 70 IN | BODY MASS INDEX: 41.95 KG/M2 | RESPIRATION RATE: 20 BRPM | SYSTOLIC BLOOD PRESSURE: 148 MMHG | HEART RATE: 77 BPM

## 2017-05-17 DIAGNOSIS — Z00.00 ROUTINE GENERAL MEDICAL EXAMINATION AT A HEALTH CARE FACILITY: ICD-10-CM

## 2017-05-17 DIAGNOSIS — I82.4Y9 DEEP VEIN THROMBOSIS (DVT) OF PROXIMAL LOWER EXTREMITY, UNSPECIFIED CHRONICITY, UNSPECIFIED LATERALITY (HCC): Primary | ICD-10-CM

## 2017-05-17 DIAGNOSIS — M25.561 CHRONIC PAIN OF RIGHT KNEE: ICD-10-CM

## 2017-05-17 DIAGNOSIS — G89.29 CHRONIC PAIN OF RIGHT KNEE: ICD-10-CM

## 2017-05-17 DIAGNOSIS — Z51.81 MEDICATION MONITORING ENCOUNTER: ICD-10-CM

## 2017-05-17 DIAGNOSIS — Z13.39 SCREENING FOR ALCOHOLISM: ICD-10-CM

## 2017-05-17 LAB
BARBITURATES UR POC: NEGATIVE
BENZODIAZEPINES UR POC: NEGATIVE
COCAINE QL URINE POC: NEGATIVE
INR BLD: 1.3
LOT EXP DATE POC: NORMAL
LOT NUMBER POC: NORMAL
MARIJUANA (THC) QL URINE POC: NEGATIVE
MDMA/ECSTASY UR POC: NEGATIVE
METHADONE QL URINE POC: NEGATIVE
METHAMPHETAMINE QL URINE POC: NEGATIVE
NTI OTHER MICRO TRANSPORT 977598: NEGATIVE
OPIATES QL URINE POC: NEGATIVE
OXYCODONE UR POC: NEGATIVE
PT POC: 15.6 SECONDS
VALID INTERNAL CONTROL?: YES
VALID INTERNAL CONTROL?: YES

## 2017-05-17 RX ORDER — OXYCODONE AND ACETAMINOPHEN 5; 325 MG/1; MG/1
1 TABLET ORAL
Qty: 60 TAB | Refills: 0 | Status: SHIPPED | OUTPATIENT
Start: 2017-05-17 | End: 2017-09-15 | Stop reason: SDUPTHER

## 2017-05-17 RX ORDER — ALBUTEROL SULFATE 0.83 MG/ML
2.5 SOLUTION RESPIRATORY (INHALATION)
Status: ON HOLD | COMMUNITY
Start: 2017-03-20 | End: 2019-01-18 | Stop reason: ALTCHOICE

## 2017-05-17 RX ORDER — WARFARIN 2 MG/1
TABLET ORAL
Qty: 30 TAB | Refills: 5
Start: 2017-05-17 | End: 2017-07-11 | Stop reason: SDUPTHER

## 2017-05-17 RX ORDER — OXYCODONE AND ACETAMINOPHEN 5; 325 MG/1; MG/1
TABLET ORAL
Refills: 0 | COMMUNITY
Start: 2017-02-23 | End: 2017-07-11 | Stop reason: SDUPTHER

## 2017-05-17 RX ORDER — WARFARIN 4 MG/1
TABLET ORAL
Refills: 0 | COMMUNITY
Start: 2017-02-11 | End: 2017-07-11

## 2017-05-17 NOTE — PROGRESS NOTES
Zachery Jasmine is a 63 y.o. female   Chief Complaint   Patient presents with    Follow-up     INR check    pt here for f/u on her INR. Pt currently taking 2mg daily of coumadin. Did not notice much difference with increase in bumex but states that overall she is feeling better. Has not gained any weight tho. Pt  Did leave a urine sample today as we had discussed pain meds last week since she is not able to tolerate and has failed non narcotic options and unable to take nsaid's.  checked. Has used oxycodone with relief in past of knee and back pain. she is a 63y.o. year old female who presents for evalution. Reviewed PmHx, RxHx, FmHx, SocHx, AllgHx and updated and dated in the chart. Review of Systems - negative except as listed above in the HPI    Objective:     Vitals:    05/17/17 1420   BP: 148/74   Pulse: 77   Resp: 20   Temp: 97.8 °F (36.6 °C)   SpO2: 99%   Weight: 334 lb (151.5 kg)   Height: 5' 10\" (1.778 m)       Current Outpatient Prescriptions   Medication Sig    albuterol (PROVENTIL VENTOLIN) 2.5 mg /3 mL (0.083 %) nebulizer solution     oxyCODONE-acetaminophen (PERCOCET) 5-325 mg per tablet TAKE 1 TABLET BY MOUTH EVERY 8 HOURS AS NEEDED FOR PAIN, MAX DAILY AMOUNT OF 3 TABLETS    warfarin (COUMADIN) 2 mg tablet 1.5 tabs PO daily    oxyCODONE-acetaminophen (PERCOCET) 5-325 mg per tablet Take 1 Tab by mouth every twelve (12) hours as needed for Pain. Max Daily Amount: 2 Tabs.  bumetanide (BUMEX) 1 mg tablet 2 tab PO Qam and 1 tab Qafternoon    polyethylene glycol (MIRALAX) 17 gram packet Take 17 g by mouth daily as needed.  insulin aspart protamine/insulin aspart (NOVOLOG MIX 70-30) 100 unit/mL (70-30) injection by SubCUTAneous route. Uses sliding scale    ondansetron (ZOFRAN ODT) 4 mg disintegrating tablet Take 1 Tab by mouth every six (6) hours as needed for Nausea.  (Patient taking differently: Take 4 mg by mouth as needed for Nausea.)    albuterol-ipratropium (DUONEB) 2.5 mg-0.5 mg/3 ml nebu 3 mL by Nebulization route daily as needed. Takes about 4 days a week    fluticasone (FLONASE) 50 mcg/actuation nasal spray 2 Sprays by Both Nostrils route nightly as needed.  calcitRIOL (ROCALTROL) 0.25 mcg capsule Take 0.25 mcg by mouth two (2) days a week. Patient takes on Monday and Thursday    isosorbide mononitrate ER (IMDUR) 120 mg CR tablet TAKE 1 TABLET BY MOUTH EVERY DAY    carvedilol (COREG) 25 mg tablet TAKE 1 TABLET BY MOUTH TWICE A DAY    pantoprazole (PROTONIX) 40 mg tablet Take 1 Tab by mouth daily. Indications: GASTROESOPHAGEAL REFLUX    nitroglycerin (NITROSTAT) 0.3 mg SL tablet 1 Tab by SubLINGual route every five (5) minutes as needed for Chest Pain.  ergocalciferol (VITAMIN D2) 50,000 unit capsule Take 50,000 Units by mouth every Tuesday and Thursday.  aspirin 81 mg tablet Take 81 mg by mouth daily.  atorvastatin (LIPITOR) 80 mg tablet Take 80 mg by mouth every evening.  warfarin (COUMADIN) 4 mg tablet TAKE 1 TABLET BY MOUTH EVERY DAY    magnesium oxide (MAG-OX) 400 mg tablet Take 1 Tab by mouth two (2) times a day.  senna (SENNA) 8.6 mg tablet Take 2 Tabs by mouth nightly.  docusate sodium (COLACE) 100 mg capsule Take 100 mg by mouth nightly.  tiotropium-olodaterol (STIOLTO RESPIMAT) 2.5-2.5 mcg/actuation mist Take 2 Puffs by inhalation Daily (before lunch). Uses daily at 11 am     sucralfate (CARAFATE) 1 gram tablet Take 1 g by mouth Before breakfast, lunch, dinner and at bedtime. No current facility-administered medications for this visit.         Physical Examination: General appearance - alert, well appearing, and in no distress  Mental status - alert, oriented to person, place, and time  Eyes - pupils equal and reactive, extraocular eye movements intact  Chest - clear to auscultation, no wheezes, rales or rhonchi, symmetric air entry  Heart - normal rate, regular rhythm, normal S1, S2, no murmurs, rubs, clicks or aparna  Musculoskeletal - R knee ttp ant joint  Prior TKR scar, pain with movement passive and active      Assessment/ Plan:   Josephine Jason was seen today for follow-up. Diagnoses and all orders for this visit:    Deep vein thrombosis (DVT) of proximal lower extremity, unspecified chronicity, unspecified laterality (HCC)  -     warfarin (COUMADIN) 2 mg tablet; 1.5 tabs PO daily    Medication monitoring encounter  -     AMB POC PT/INR  -     AMB POC ALERE ICUP DX DRUG SCREEN 10    Chronic pain of right knee  -     oxyCODONE-acetaminophen (PERCOCET) 5-325 mg per tablet; Take 1 Tab by mouth every twelve (12) hours as needed for Pain. Max Daily Amount: 2 Tabs. Routine general medical examination at a health care facility    Screening for alcoholism     utox appropriate  Follow-up Disposition:  Return in about 1 week (around 5/24/2017), or if symptoms worsen or fail to improve. I have discussed the diagnosis with the patient and the intended plan as seen in the above orders. The patient has received an after-visit summary and questions were answered concerning future plans. Pt conveyed understanding of plan. Medication Side Effects and Warnings were discussed with patient      Margoth Zhang, DO       This is an Initial Medicare Annual Wellness Exam (AWV) (Performed 12 months after IPPE or effective date of Medicare Part B enrollment, Once in a lifetime)    I have reviewed the patient's medical history in detail and updated the computerized patient record.      History     Past Medical History:   Diagnosis Date     Sleep Apnea 2/16/2011    Angina, class III ([facility] 8/9/2013    Aortic aneurysm ([facility])     CAD (coronary Artery Disease) Native Artery 2/16/2011    CKD (chronic kidney disease) stage 4, GFR 15-29 ml/min ([facility]) 2/10/2017    Diabetic gastroparesis ([facility])     Diastolic heart failure ([facility] 10/5/2012    Esophageal stricture 2012    dialted Dr. Patricia Bernstein G6PD deficiency ([facility]     trait    GERD (gastroesophageal reflux disease)     Hypertensive Cardiovasc Dis Benign, No CHF 2/16/2011    ILD (interstitial lung disease) ([facility]     open lung bx CJW 2010    OA (osteoarthritis)     Obesity 2/16/2011    Ovarian cancer ([facility]     cervical and uterine    PNA (pneumonia)     Rheumatoid arteritis ([facility]     T2DM (type 2 diabetes mellitus) ([facility] 8/9/2013    Tobacco use disorder 3/21/2012    Uterine cervix cancer ([facility]     Vitamin D deficiency 8/9/2013      Past Surgical History:   Procedure Laterality Date    CARDIAC SURG PROCEDURE UNLIST      stents    COLONOSCOPY N/A 6/24/2016    COLONOSCOPY performed by Darwin Duque MD at [facility] HX APPENDECTOMY      HX CARPAL TUNNEL RELEASE      bilateral    HX CHOLECYSTECTOMY      HX HERNIA REPAIR      HX HYSTERECTOMY      HX ORTHOPAEDIC  11/12/12    right knee replacement    HX TONSIL AND ADENOIDECTOMY      UPPER GI ENDOSCOPY,DILATN W GUIDE  6/24/2016          Current Outpatient Prescriptions   Medication Sig Dispense Refill    albuterol (PROVENTIL VENTOLIN) 2.5 mg /3 mL (0.083 %) nebulizer solution       oxyCODONE-acetaminophen (PERCOCET) 5-325 mg per tablet TAKE 1 TABLET BY MOUTH EVERY 8 HOURS AS NEEDED FOR PAIN, MAX DAILY AMOUNT OF 3 TABLETS  0    warfarin (COUMADIN) 2 mg tablet 1.5 tabs PO daily 30 Tab 5    oxyCODONE-acetaminophen (PERCOCET) 5-325 mg per tablet Take 1 Tab by mouth every twelve (12) hours as needed for Pain. Max Daily Amount: 2 Tabs. 60 Tab 0    bumetanide (BUMEX) 1 mg tablet 2 tab PO Qam and 1 tab Qafternoon 90 Tab 3    polyethylene glycol (MIRALAX) 17 gram packet Take 17 g by mouth daily as needed.  insulin aspart protamine/insulin aspart (NOVOLOG MIX 70-30) 100 unit/mL (70-30) injection by SubCUTAneous route. Uses sliding scale      ondansetron (ZOFRAN ODT) 4 mg disintegrating tablet Take 1 Tab by mouth every six (6) hours as needed for Nausea.  (Patient taking differently: Take 4 mg by mouth as needed for Nausea.) 30 Tab 0    albuterol-ipratropium (DUONEB) 2.5 mg-0.5 mg/3 ml nebu 3 mL by Nebulization route daily as needed. Takes about 4 days a week      fluticasone (FLONASE) 50 mcg/actuation nasal spray 2 Sprays by Both Nostrils route nightly as needed.  calcitRIOL (ROCALTROL) 0.25 mcg capsule Take 0.25 mcg by mouth two (2) days a week. Patient takes on Monday and Thursday      isosorbide mononitrate ER (IMDUR) 120 mg CR tablet TAKE 1 TABLET BY MOUTH EVERY DAY 30 Tab 5    carvedilol (COREG) 25 mg tablet TAKE 1 TABLET BY MOUTH TWICE A  Tab 3    pantoprazole (PROTONIX) 40 mg tablet Take 1 Tab by mouth daily. Indications: GASTROESOPHAGEAL REFLUX 90 Tab 1    nitroglycerin (NITROSTAT) 0.3 mg SL tablet 1 Tab by SubLINGual route every five (5) minutes as needed for Chest Pain. 1 Bottle 1    ergocalciferol (VITAMIN D2) 50,000 unit capsule Take 50,000 Units by mouth every Tuesday and Thursday.  aspirin 81 mg tablet Take 81 mg by mouth daily.  atorvastatin (LIPITOR) 80 mg tablet Take 80 mg by mouth every evening.  warfarin (COUMADIN) 4 mg tablet TAKE 1 TABLET BY MOUTH EVERY DAY  0    magnesium oxide (MAG-OX) 400 mg tablet Take 1 Tab by mouth two (2) times a day. 60 Tab 0    senna (SENNA) 8.6 mg tablet Take 2 Tabs by mouth nightly.  docusate sodium (COLACE) 100 mg capsule Take 100 mg by mouth nightly.  tiotropium-olodaterol (STIOLTO RESPIMAT) 2.5-2.5 mcg/actuation mist Take 2 Puffs by inhalation Daily (before lunch). Uses daily at 11 am  1 Inhaler 1    sucralfate (CARAFATE) 1 gram tablet Take 1 g by mouth Before breakfast, lunch, dinner and at bedtime.        Allergies   Allergen Reactions    Contrast Dye [Iodine] Anaphylaxis    Levaquin [Levofloxacin] Nausea and Vomiting    Morphine Hives and Itching     Family History   Problem Relation Age of Onset    Heart Disease Mother     Heart Disease Brother      Social History   Substance Use Topics    Smoking status: Former Smoker Packs/day: 0.50     Years: 41.00     Types: Cigarettes     Quit date: 11/9/2014    Smokeless tobacco: Never Used    Alcohol use No     Patient Active Problem List   Diagnosis Code    CAD (coronary Artery Disease) Native Artery I25.10    JASEN on CPAP G47.33, Z99.89    Interstitial lung disease ([facility] J84.9    Pulmonary HTN ([facility]) I27.2    HTN (hypertension) I10    Morbid obesity E66.01    Esophageal reflux K21.9    Gastroparesis K31.84    Chronic diastolic heart failure ([facility]) I50.32    DJD (degenerative joint disease) of knee M17.10    Normocytic anemia D64.9    DM (diabetes mellitus), type 2 with renal complications ([facility]) S27.60    Vitamin D deficiency E55.9    Angina, class III ([facility]) I20.9    DVT (deep venous thrombosis) ([facility]) I82.409    CKD (chronic kidney disease) stage 4, GFR 15-29 ml/min ([facility]) N18.4    Acute and chronic respiratory failure with hypoxia ([facility]) J96.21    HCAP (healthcare-associated pneumonia) J18.9    Acute exacerbation of chronic obstructive pulmonary disease (COPD) ([facility]) J44.1         Depression Risk Factor Screening:     PHQ over the last two weeks 3/29/2017   Little interest or pleasure in doing things Not at all   Feeling down, depressed or hopeless Not at all   Total Score PHQ 2 0     Alcohol Risk Factor Screening: On any occasion during the past 3 months, have you had more than 3 drinks containing alcohol? No    Do you average more than 7 drinks per week? No    Functional Ability and Level of Safety:     Hearing Loss   mild    Activities of Daily Living   Self-care. Requires assistance with: no ADLs    Fall Risk   No flowsheet data found. Abuse Screen   Patient is not abused    Review of Systems   A comprehensive review of systems was negative except for that written in the HPI.     Physical Examination     No exam data present    Evaluation of Cognitive Function:  Mood/affect:  happy  Appearance: age appropriate  Family member/caregiver input: n/a    See above PE    Patient Care Team:  Sherryle Netter, DO as PCP - General (Family Practice)  Evelyn Spence MD (Endocrinology)  Margie Child MD (Orthopedic Surgery)  Cecy Charles MD as Consulting Provider (Cardiology)  Salma Sultana MD (Nephrology)  Sweta Marcos DO as Consulting Provider (Pulmonary Disease)  Deana Tanner, RN as Ambulatory Care Navigator    Advice/Referrals/Counseling   Education and counseling provided:  Are appropriate based on today's review and evaluation  End-of-Life planning (with patient's consent)      Assessment/Plan       ICD-10-CM ICD-9-CM    1. Deep vein thrombosis (DVT) of proximal lower extremity, unspecified chronicity, unspecified laterality (HCC) I82.4Y9 453.41 warfarin (COUMADIN) 2 mg tablet   2. Medication monitoring encounter Z51.81 V58.83 AMB POC PT/INR      AMB POC ALERE ICUP DX DRUG SCREEN 10   3. Chronic pain of right knee M25.561 719.46 oxyCODONE-acetaminophen (PERCOCET) 5-325 mg per tablet    G89.29 338.29    4. Routine general medical examination at a health care facility Z00.00 V70.0    5. Screening for alcoholism Z13.89 V79.1    . I have discussed the diagnosis with the patient and the intended plan as seen in the above orders. The patient has received an after-visit summary and questions were answered concerning future plans. Pt conveyed understanding of plan.       Dr Sherryle Netter

## 2017-05-17 NOTE — PATIENT INSTRUCTIONS

## 2017-05-17 NOTE — PROGRESS NOTES
Serina Posada is a 63 y.o. female  Chief Complaint   Patient presents with    Follow-up     INR check     1. Have you been to the ER, urgent care clinic since your last visit? Hospitalized since your last visit? No    2. Have you seen or consulted any other health care providers outside of the [address] [place] [address] since your last visit? Include any pap smears or colon screening.  No

## 2017-05-24 ENCOUNTER — OFFICE VISIT (OUTPATIENT)
Dept: FAMILY MEDICINE CLINIC | Age: 62
End: 2017-05-24

## 2017-05-24 VITALS
SYSTOLIC BLOOD PRESSURE: 132 MMHG | WEIGHT: 293 LBS | RESPIRATION RATE: 18 BRPM | DIASTOLIC BLOOD PRESSURE: 60 MMHG | OXYGEN SATURATION: 94 % | TEMPERATURE: 97.8 F | HEIGHT: 70 IN | HEART RATE: 74 BPM | BODY MASS INDEX: 41.95 KG/M2

## 2017-05-24 DIAGNOSIS — J43.2 CENTRILOBULAR EMPHYSEMA (HCC): ICD-10-CM

## 2017-05-24 DIAGNOSIS — Z74.09 DECREASED AMBULATION STATUS: Primary | ICD-10-CM

## 2017-05-24 DIAGNOSIS — Z79.01 ON WARFARIN THERAPY: ICD-10-CM

## 2017-05-24 LAB
INR BLD: 1.9
PT POC: NORMAL SECONDS
VALID INTERNAL CONTROL?: YES

## 2017-05-24 NOTE — PROGRESS NOTES
Dayan Dunn is a 63 y.o. female   Chief Complaint   Patient presents with    Follow-up    pt here for f/u INR and no issues with coumadin. Current INR is 1.9 and will continue with current dose and recheck in 2 weeks. Pt has contacted a company regarding a scooter and feels that this would benefit her mobility greatly. Will Rx. Pt also with SOB with movement and ambulation requiring frequent breaks. Obn oxygen therapy which does help some but still gets SOB. she is a 63y.o. year old female who presents for evalution. Reviewed PmHx, RxHx, FmHx, SocHx, AllgHx and updated and dated in the chart. Review of Systems - negative except as listed above in the HPI    Objective:     Vitals:    05/24/17 1419   BP: 132/60   Pulse: 74   Resp: 18   Temp: 97.8 °F (36.6 °C)   TempSrc: Oral   SpO2: 94%   Weight: 334 lb 12.8 oz (151.9 kg)   Height: 5' 10\" (1.778 m)       Current Outpatient Prescriptions   Medication Sig    albuterol (PROVENTIL VENTOLIN) 2.5 mg /3 mL (0.083 %) nebulizer solution     oxyCODONE-acetaminophen (PERCOCET) 5-325 mg per tablet TAKE 1 TABLET BY MOUTH EVERY 8 HOURS AS NEEDED FOR PAIN, MAX DAILY AMOUNT OF 3 TABLETS    warfarin (COUMADIN) 4 mg tablet TAKE 1 TABLET BY MOUTH EVERY DAY    warfarin (COUMADIN) 2 mg tablet 1.5 tabs PO daily    oxyCODONE-acetaminophen (PERCOCET) 5-325 mg per tablet Take 1 Tab by mouth every twelve (12) hours as needed for Pain. Max Daily Amount: 2 Tabs.  bumetanide (BUMEX) 1 mg tablet 2 tab PO Qam and 1 tab Qafternoon    magnesium oxide (MAG-OX) 400 mg tablet Take 1 Tab by mouth two (2) times a day.  polyethylene glycol (MIRALAX) 17 gram packet Take 17 g by mouth daily as needed.  senna (SENNA) 8.6 mg tablet Take 2 Tabs by mouth nightly.  docusate sodium (COLACE) 100 mg capsule Take 100 mg by mouth nightly.  insulin aspart protamine/insulin aspart (NOVOLOG MIX 70-30) 100 unit/mL (70-30) injection by SubCUTAneous route.  Uses sliding scale  tiotropium-olodaterol (STIOLTO RESPIMAT) 2.5-2.5 mcg/actuation mist Take 2 Puffs by inhalation Daily (before lunch). Uses daily at 11 am     ondansetron (ZOFRAN ODT) 4 mg disintegrating tablet Take 1 Tab by mouth every six (6) hours as needed for Nausea. (Patient taking differently: Take 4 mg by mouth as needed for Nausea.)    albuterol-ipratropium (DUONEB) 2.5 mg-0.5 mg/3 ml nebu 3 mL by Nebulization route daily as needed. Takes about 4 days a week    sucralfate (CARAFATE) 1 gram tablet Take 1 g by mouth Before breakfast, lunch, dinner and at bedtime.  fluticasone (FLONASE) 50 mcg/actuation nasal spray 2 Sprays by Both Nostrils route nightly as needed.  calcitRIOL (ROCALTROL) 0.25 mcg capsule Take 0.25 mcg by mouth two (2) days a week. Patient takes on Monday and Thursday    isosorbide mononitrate ER (IMDUR) 120 mg CR tablet TAKE 1 TABLET BY MOUTH EVERY DAY    carvedilol (COREG) 25 mg tablet TAKE 1 TABLET BY MOUTH TWICE A DAY    pantoprazole (PROTONIX) 40 mg tablet Take 1 Tab by mouth daily. Indications: GASTROESOPHAGEAL REFLUX    nitroglycerin (NITROSTAT) 0.3 mg SL tablet 1 Tab by SubLINGual route every five (5) minutes as needed for Chest Pain.  ergocalciferol (VITAMIN D2) 50,000 unit capsule Take 50,000 Units by mouth every Tuesday and Thursday.  aspirin 81 mg tablet Take 81 mg by mouth daily.  atorvastatin (LIPITOR) 80 mg tablet Take 80 mg by mouth every evening. No current facility-administered medications for this visit. Physical Examination: General appearance - alert, well appearing, and in no distress  Eyes - pupils equal and reactive, extraocular eye movements intact  Chest - clear to auscultation, no wheezes, rales or rhonchi, symmetric air entry  Heart - normal rate, regular rhythm, normal S1, S2, no murmurs, rubs, clicks or gallops      Assessment/ Plan:   Sudheer Fowler was seen today for follow-up.     Diagnoses and all orders for this visit:    Decreased ambulation status    On warfarin therapy  -     AMB POC PT/INR    Centrilobular emphysema ([address]     Rx for scooter written esthela fax when pt supplies DME supplier info  Follow-up Disposition:  Return in about 2 weeks (around 6/7/2017), or if symptoms worsen or fail to improve. I have discussed the diagnosis with the patient and the intended plan as seen in the above orders. The patient has received an after-visit summary and questions were answered concerning future plans. Pt conveyed understanding of plan.     Medication Side Effects and Warnings were discussed with patient      Charito Aguilar, DO

## 2017-05-24 NOTE — PATIENT INSTRUCTIONS
Anticoagulants: After Your Visit  Your Care Instructions  Your doctor prescribed an anticoagulant medicine. Anticoagulants, often called blood thinners, prevent new blood clots from forming and keep existing clots from getting larger. They do not actually thin the blood, but they make the blood take longer to clot. This lowers the risk of a blood clot moving to the lungs (pulmonary embolism) or moving to the brain and causing a stroke. Blood thinners come in two forms. Heparin is given by shot, either under your skin or through a needle in your vein, and starts working right away. Warfarin (Coumadin) comes in pill form and takes longer to work. Your doctor may have you begin taking both forms at the same time. As soon as the pills start to work, you will stop the shots but continue to take the pills. If you have a blood clot in your leg, you may need to take warfarin for several months. People who have heart conditions such as atrial fibrillation often need to take it for the rest of their lives. The right dose of this medicine is different for each person. You will need regular blood tests to see if your dose is correct. Follow-up care is a key part of your treatment and safety. Be sure to make and go to all appointments, and call your doctor if you are having problems. Itâs also a good idea to know your test results and keep a list of the medicines you take. How do you take blood thinners? · Take your medicines exactly as prescribed. Call your doctor if you think you are having a problem with your medicine. · Call your doctor if you are not sure what to do if you missed a dose of blood thinner. ¨ Your doctor can tell you exactly what to do so you do not take too much or too little blood thinner. Then you will be as safe as possible. · Some general rules for what to do if you miss a dose:  ¨ If you remember it in the same day, take the missed dose. Then go back to your regular schedule.   ¨ If it is the next day, or almost time to take the next dose, do not take the missed dose. Do not double the dose to make up for the missed one. At your next regularly scheduled time, take your normal dose. ¨ If you miss your dose for 2 or more days, call your doctor. · To help you stay on schedule, use a calendar to remind you when to take your blood thinner. When you take the medicine, note it on the calendar. · If you are going to give yourself shots, your doctor will give you instructions for how to safely inject the medicine. Follow the directions carefully. · Do not take any vitamins, over-the-counter medicines, or herbal products without talking to your doctor first.  · Avoid contact sports and other activities that could lead to injury. Make your home safe and take other measures to reduce your risk of falling. Always wear a seat belt while in a car. · Do not suddenly change your intake of vitamin Kârich foods, such as broccoli, cabbage, asparagus, lettuce, and spinach. This will help blood thinners work evenly from day to day. · Limit alcohol to 2 drinks a day for men and 1 drink a day for women. Alcohol may interfere with blood thinners. It also increases your risk of falls, which can cause bruising and bleeding. · Tell your dentist, pharmacist, and other health professionals that you take blood thinners. Wear medical alert jewelry that says you take blood thinners. You can buy this at most drugstores. When should you call for help? Call 911 anytime you think you may need emergency care. For example, call if:  · You cough up blood. · You vomit blood or what looks like coffee grounds. · You pass maroon or very bloody stools. Call your doctor now or seek immediate medical care if:  · You have new bruises or blood spots under your skin. · You have a nosebleed. · Your gums bleed when you brush your teeth. · You have blood in your urine. · Your stools are black and tarlike or have streaks of blood.   · You have vaginal bleeding when you are not having your period, or heavy period bleeding. Watch closely for changes in your health, and be sure to contact your doctor if:  · You have questions about your medicine. Where can you learn more? Go to [employer]  Enter Y359 in the search box to learn more about \"Anticoagulants: After Your Visit. \"   Â© 0335-5018 [employer]. Care instructions adapted under license by [employer] (which disclaims liability or warranty for this information). This care instruction is for use with your licensed healthcare professional. If you have questions about a medical condition or this instruction, always ask your healthcare professional. [place] [address] any warranty or liability for your use of this information.   Content Version: 4.0.97159; Last Revised: July 1, 2009

## 2017-06-07 ENCOUNTER — PATIENT OUTREACH (OUTPATIENT)
Dept: FAMILY MEDICINE CLINIC | Age: 62
End: 2017-06-07

## 2017-06-07 ENCOUNTER — OFFICE VISIT (OUTPATIENT)
Dept: FAMILY MEDICINE CLINIC | Age: 62
End: 2017-06-07

## 2017-06-07 VITALS
RESPIRATION RATE: 20 BRPM | HEART RATE: 94 BPM | TEMPERATURE: 97.8 F | WEIGHT: 293 LBS | BODY MASS INDEX: 41.95 KG/M2 | HEIGHT: 70 IN | DIASTOLIC BLOOD PRESSURE: 70 MMHG | OXYGEN SATURATION: 95 % | SYSTOLIC BLOOD PRESSURE: 128 MMHG

## 2017-06-07 DIAGNOSIS — R60.0 LEG EDEMA, RIGHT: Primary | ICD-10-CM

## 2017-06-07 DIAGNOSIS — Z51.81 MEDICATION MONITORING ENCOUNTER: ICD-10-CM

## 2017-06-07 LAB
INR BLD: 2.4
PT POC: NORMAL SECONDS
VALID INTERNAL CONTROL?: YES

## 2017-06-07 NOTE — PROGRESS NOTES
6/7/17, Chart Review. No further ED/Hospitalizations noted at this time. Patient continues to keep regular F/U appts with Dr Ric Rodriguez. Patient seen today, 6/7/17. Dr Ric Rodriguez agrees to notify this writer/NN of further NN needs/need for further assistance. See Previous NN/Patient Outreach Documentation:  4/14/17, This writer/NN and [facility] access, able to confirm,patient hospitalized, 4/2/17 - 4/11/17 at [facility] related to Chest Pain and known Interstitial Lung Disease. Printed Discharge Summary for Dr Ric Rodriguez to review. Per Discharge Summary:  Cardiology Work-up, Serial Cardiac Enzymes, ECHO(EF 94-74%, Grade I Diastolic Dysfunction), and negative stress test, cleared for discharge. PULM, continue outpatient F/U, ICS(Dulera 2 puffs, twice daily) and home medications. 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 H/O DVT/Anticoagulation, Discharged on Coumadin, INR at discharge 2.3  Urology, CT complex renal cyst and renal ultrasound with bilateral complex cyst.  Patient also receiving Ergocalciferol(Tuesday and Thursday). Patient instructed to F/U with PCP for INR, CBC and renal profile. Also to have PULM and Renal F/U appts. Patient instructed to follow 1.5 L/day fluid restriction. Patient S/P 2/28/17 - 3/10/17 at [facility] related to Pneumonia/COPD Exacerbation. Patient kept F/U appt with Dr Ric Rodriguez, 3/15/17 and Dr Linda San, 3/22/17. Also kept scheduled appt with Dr Ric Rodriguez, 3/29/17 at 2:30pm  4/4/17, Per chart review/this writer/NN and [facility] access, patient has required further hospitalization, admitted, 4/2/17 to [facility] related to chest pain and severe Hypokalemia(K+ at 2.9). Aso of note, chronic kidney disease(BUN/Cr at 80/4.7), nearing dialysis, Renal Consult. See Previous NN/Patient Outreach Documentation:   S/P [facility], 2/16/17 - 2/20/17 related to CHF Exacerbation.   Patient kept F/U appt with Dr Ric Rodriguez, 2/23/17, next scheduled appt, 3/9/17, to see PULM, 2/28/17. Patient kept PULM F/U appt, 2/28/17 and is being sent back to ED/further medical evaluation. S/P [facility], 2/2/17 - 2/10/17 related to Resp. Failure/Interstitial Lung Disease, DVT, Diastolic HF, PULM HTN and CKD. 2/16/17, Patient in office to establish PCP with Dr Sary Ibrahim, reports recent [facility] hospitalization and ED visit. Dr Sary Ibrahim contacted this writer/NN with concerns of 30 pound weight gain since discharge, INR at 5. 4(recent DVT on coumadin), Diabetes, CHF,and CKD, recommends patient return to [facility] for further medical evaluation

## 2017-06-07 NOTE — PROGRESS NOTES
Cristela Anton is a 63 y.o. female No chief complaint on file. Pt here with R leg pain and edema that is not resolving over ngiht. Pt states feels like DVT and esthela lsend for doppler. Pt was sub therapeutic until recently. INR currently stable will recheck in a month. she is a 63y.o. year old female who presents for evalution. Reviewed PmHx, RxHx, FmHx, SocHx, AllgHx and updated and dated in the chart. Review of Systems - negative except as listed above in the HPI    Objective:     Vitals:    06/07/17 1403   BP: 128/70   Pulse: 94   Resp: 20   Temp: 97.8 °F (36.6 °C)   TempSrc: Oral   SpO2: 95%   Weight: 343 lb (155.6 kg)   Height: 5' 10\" (1.778 m)       Current Outpatient Prescriptions   Medication Sig    albuterol (PROVENTIL VENTOLIN) 2.5 mg /3 mL (0.083 %) nebulizer solution     oxyCODONE-acetaminophen (PERCOCET) 5-325 mg per tablet TAKE 1 TABLET BY MOUTH EVERY 8 HOURS AS NEEDED FOR PAIN, MAX DAILY AMOUNT OF 3 TABLETS    warfarin (COUMADIN) 4 mg tablet TAKE 1 TABLET BY MOUTH EVERY DAY    warfarin (COUMADIN) 2 mg tablet 1.5 tabs PO daily    oxyCODONE-acetaminophen (PERCOCET) 5-325 mg per tablet Take 1 Tab by mouth every twelve (12) hours as needed for Pain. Max Daily Amount: 2 Tabs.  bumetanide (BUMEX) 1 mg tablet 2 tab PO Qam and 1 tab Qafternoon    magnesium oxide (MAG-OX) 400 mg tablet Take 1 Tab by mouth two (2) times a day.  polyethylene glycol (MIRALAX) 17 gram packet Take 17 g by mouth daily as needed.  senna (SENNA) 8.6 mg tablet Take 2 Tabs by mouth nightly.  docusate sodium (COLACE) 100 mg capsule Take 100 mg by mouth nightly.  insulin aspart protamine/insulin aspart (NOVOLOG MIX 70-30) 100 unit/mL (70-30) injection by SubCUTAneous route. Uses sliding scale    tiotropium-olodaterol (STIOLTO RESPIMAT) 2.5-2.5 mcg/actuation mist Take 2 Puffs by inhalation Daily (before lunch).  Uses daily at 11 am     ondansetron (ZOFRAN ODT) 4 mg disintegrating tablet Take 1 Tab by mouth every six (6) hours as needed for Nausea. (Patient taking differently: Take 4 mg by mouth as needed for Nausea.)    albuterol-ipratropium (DUONEB) 2.5 mg-0.5 mg/3 ml nebu 3 mL by Nebulization route daily as needed. Takes about 4 days a week    sucralfate (CARAFATE) 1 gram tablet Take 1 g by mouth Before breakfast, lunch, dinner and at bedtime.  fluticasone (FLONASE) 50 mcg/actuation nasal spray 2 Sprays by Both Nostrils route nightly as needed.  calcitRIOL (ROCALTROL) 0.25 mcg capsule Take 0.25 mcg by mouth two (2) days a week. Patient takes on Monday and Thursday    isosorbide mononitrate ER (IMDUR) 120 mg CR tablet TAKE 1 TABLET BY MOUTH EVERY DAY    carvedilol (COREG) 25 mg tablet TAKE 1 TABLET BY MOUTH TWICE A DAY    pantoprazole (PROTONIX) 40 mg tablet Take 1 Tab by mouth daily. Indications: GASTROESOPHAGEAL REFLUX    nitroglycerin (NITROSTAT) 0.3 mg SL tablet 1 Tab by SubLINGual route every five (5) minutes as needed for Chest Pain.  ergocalciferol (VITAMIN D2) 50,000 unit capsule Take 50,000 Units by mouth every Tuesday and Thursday.  aspirin 81 mg tablet Take 81 mg by mouth daily.  atorvastatin (LIPITOR) 80 mg tablet Take 80 mg by mouth every evening. No current facility-administered medications for this visit. Physical Examination: General appearance - alert, well appearing, and in no distress  Eyes - pupils equal and reactive, extraocular eye movements intact  Chest - clear to auscultation, no wheezes, rales or rhonchi, symmetric air entry  Heart - normal rate, regular rhythm, normal S1, S2, no murmurs, rubs, clicks or gallops  Extremities - pedal edema 2 + on R with calf ttp      Assessment/ Plan:   Diagnoses and all orders for this visit:    Leg edema, right  -     DUPLEX LOWER EXT VENOUS RIGHT;  Future    Medication monitoring encounter  -     AMB POC PT/INR     at goal INR recheck 4 weeks  Follow-up Disposition:  Return in about 1 month (around 7/7/2017), or if symptoms worsen or fail to improve. I have discussed the diagnosis with the patient and the intended plan as seen in the above orders. The patient has received an after-visit summary and questions were answered concerning future plans. Pt conveyed understanding of plan.     Medication Side Effects and Warnings were discussed with patient      Margoth Zhang, DO

## 2017-06-09 ENCOUNTER — HOSPITAL ENCOUNTER (OUTPATIENT)
Dept: VASCULAR SURGERY | Age: 62
Discharge: HOME | End: 2017-06-09
Attending: FAMILY MEDICINE
Payer: MEDICARE

## 2017-06-09 ENCOUNTER — TELEPHONE (OUTPATIENT)
Dept: FAMILY MEDICINE CLINIC | Age: 62
End: 2017-06-09

## 2017-06-09 DIAGNOSIS — R60.0 LEG EDEMA, RIGHT: ICD-10-CM

## 2017-06-09 PROCEDURE — 93971 EXTREMITY STUDY: CPT

## 2017-06-09 NOTE — PROCEDURES
[facility]  *** FINAL REPORT ***    Name: Hernandez Dill  MRN: [id number]    Outpatient  : 14 Dec 1955  HIS Order #: [id number]  [id number] [facility] Visit #: [id number]  Date: 2017    TYPE OF TEST: Peripheral Venous Testing    REASON FOR TEST  Edema    Right Leg:-  Deep venous thrombosis:           Yes  Proximal extent of thrombus:      Posterior Tibial  Superficial venous thrombosis:    No  Deep venous insufficiency:        No  Superficial venous insufficiency: No      INTERPRETATION/FINDINGS  PROCEDURE:  RIGHT LOWER EXTREMITY  VENOUS DUPLEX. Evaluation of lower  extremity veins with ultrasound (B-mode imaging, pulsed Doppler, color   Doppler). Includes the common femoral, deep femoral, femoral,  popliteal, posterior tibial, peroneal, and great saphenous veins. Other veins, for example the gastrocnemius and soleal veins, may also  be visualized. FINDINGS: Technically difficult exam due to patient body habitus. Aditya Eileen   scale imaging is suboptimal. Aditya Pocola scale and color flow duplex images  of the veins in the right lower extremity demonstrate  noncompressibility and a filling defect noted in 1 of 2 paired  posterior tibial veins in the mid to distal calf. Thrombus appears  acute. NOTE: 2D compression in the right peroneal was not adequate  therefore ruling out DVT cannot be excluded. CONCLUSION: Right lower extremity venous duplex positive for chronic  deep venous thrombosis involving 1 of 2 paired posterior tibial veins  in the mid to distal calf. Left lower extremity is thrombus free. ADDITIONAL COMMENTS    I have personally reviewed the data relevant to the interpretation of  this  study. TECHNOLOGIST: Vilma Yang  Signed: 2017 4:36:13 PM    PHYSICIAN: Pilo Thapa MD  Signed: 2017 7:28:52 AM

## 2017-06-09 NOTE — PROGRESS NOTES
Right LE venous duplex completed. Attempted to call  physicians office at [phone]. Spoke with  who stated Nurse was unavailable to receive courtesy call. A message was left to call back for results and patient was sent home. Final results to follow. UPDATE: 6/12/2017 Spoke with Cris at Physicians office. Verbal results were given at 1308. She acknowledged.

## 2017-06-09 NOTE — TELEPHONE ENCOUNTER
Judie Jerez with [facility] is asking for a call back from nurse regarding a curiosity call in reference to pt's imaging results, he is letting pt leave but you can call him back at [phone].

## 2017-06-20 ENCOUNTER — TELEPHONE (OUTPATIENT)
Dept: FAMILY MEDICINE CLINIC | Age: 62
End: 2017-06-20

## 2017-06-20 NOTE — TELEPHONE ENCOUNTER
Pt states she would like a call back from [address] [place] [address], Dr Dacia Murray nurse because she faxed some paperwork to [place] for her and they stated that they did not get it.  BCB # [phone]

## 2017-06-21 ENCOUNTER — DOCUMENTATION ONLY (OUTPATIENT)
Dept: FAMILY MEDICINE CLINIC | Age: 62
End: 2017-06-21

## 2017-06-21 PROBLEM — J44.9 COPD, SEVERE (HCC): Status: ACTIVE | Noted: 2017-06-21

## 2017-06-21 PROBLEM — Z74.09 DECREASED AMBULATION STATUS: Status: ACTIVE | Noted: 2017-06-21

## 2017-06-21 NOTE — PROGRESS NOTES
[employer] paperwork for power wheelchair packet was placed on Dr. Verenice Melendez desk for processing.

## 2017-06-27 ENCOUNTER — DOCUMENTATION ONLY (OUTPATIENT)
Dept: FAMILY MEDICINE CLINIC | Age: 62
End: 2017-06-27

## 2017-07-11 ENCOUNTER — OFFICE VISIT (OUTPATIENT)
Dept: FAMILY MEDICINE CLINIC | Age: 62
End: 2017-07-11

## 2017-07-11 VITALS
OXYGEN SATURATION: 96 % | WEIGHT: 293 LBS | HEART RATE: 76 BPM | HEIGHT: 70 IN | DIASTOLIC BLOOD PRESSURE: 80 MMHG | BODY MASS INDEX: 41.95 KG/M2 | RESPIRATION RATE: 20 BRPM | TEMPERATURE: 97.8 F | SYSTOLIC BLOOD PRESSURE: 148 MMHG

## 2017-07-11 DIAGNOSIS — I82.4Y9 DEEP VEIN THROMBOSIS (DVT) OF PROXIMAL LOWER EXTREMITY, UNSPECIFIED CHRONICITY, UNSPECIFIED LATERALITY (HCC): ICD-10-CM

## 2017-07-11 DIAGNOSIS — K22.4 ESOPHAGEAL SPASM: Primary | ICD-10-CM

## 2017-07-11 DIAGNOSIS — Z51.81 MEDICATION MONITORING ENCOUNTER: ICD-10-CM

## 2017-07-11 DIAGNOSIS — G89.29 CHRONIC PAIN OF RIGHT KNEE: ICD-10-CM

## 2017-07-11 DIAGNOSIS — M25.561 CHRONIC PAIN OF RIGHT KNEE: ICD-10-CM

## 2017-07-11 LAB
INR BLD: 3.8
PT POC: 45.2 SECONDS
VALID INTERNAL CONTROL?: YES

## 2017-07-11 RX ORDER — OXYCODONE AND ACETAMINOPHEN 5; 325 MG/1; MG/1
TABLET ORAL
Qty: 20 TAB | Refills: 0 | Status: SHIPPED | OUTPATIENT
Start: 2017-07-11 | End: 2017-08-31 | Stop reason: SDUPTHER

## 2017-07-11 RX ORDER — WARFARIN 2 MG/1
TABLET ORAL
Qty: 30 TAB | Refills: 5
Start: 2017-07-11 | End: 2017-10-10 | Stop reason: SDUPTHER

## 2017-07-11 RX ORDER — IMIPRAMINE HYDROCHLORIDE 25 MG/1
25 TABLET ORAL
Qty: 30 TAB | Refills: 5 | Status: SHIPPED | OUTPATIENT
Start: 2017-07-11 | End: 2017-11-21

## 2017-07-11 NOTE — PROGRESS NOTES
Pt here for PT INR check  C/o worsening \"esophigial spasms\" states she was dx'd last year, but is getting worse

## 2017-07-11 NOTE — PATIENT INSTRUCTIONS
Imipramine (By mouth)   Imipramine ([id number]-ON-rv-[id number])  Treats depression. Also used to treat bedwetting in children. This medicine is a TCA. Brand Name(s): Tofranil   There may be other brand names for this medicine. When This Medicine Should Not Be Used: This medicine is not right for everyone. Do not use it if you had an allergic reaction to imipramine or a similar medicine, or you had a recent heart attack. How to Use This Medicine:   Capsule, Tablet  · Take your medicine as directed. Your dose may need to be changed several times to find what works best for you. It may take 2 to 3 weeks before you start to feel better. · This medicine should come with a Medication Guide. Ask your pharmacist for a copy if you do not have one. · Missed dose: Take a dose as soon as you remember. If it is almost time for your next dose, wait until then and take a regular dose. Do not take extra medicine to make up for a missed dose. · Store the medicine in a closed container at room temperature, away from heat, moisture, and direct light. Drugs and Foods to Avoid:   Ask your doctor or pharmacist before using any other medicine, including over-the-counter medicines, vitamins, and herbal products. · Do not use this medicine and an MAO inhibitor (MAOI) within 14 days of each other. · Some foods and medicines can affect how imipramine works.  Tell your doctor if you are using any of the following:   ¨ Buspirone, cimetidine, clonidine, epinephrine, fentanyl, guanethidine, lithium, methylphenidate, norepinephrine, Cathi's wort, tramadol, or tryptophan supplements  ¨ A triptan medicine to treat migraine headaches, medicine for heart rhythm problems (quinidine, flecainide, propafenone), medicine to lower blood pressure, medicine to treat seizures (phenobarbital, phenytoin), or a phenothiazine medicine (such as chlorpromazine, perphenazine, promethazine, prochlorperazine, thioridazine)  · Tell your doctor if you are also using other medicine to treat depression, such as fluoxetine, paroxetine, or sertraline. You may need to wait at least 5 weeks before you begin using imipramine. · Tell your doctor if you use anything else that makes you sleepy. Some examples are allergy medicine, narcotic pain medicine, and alcohol. Warnings While Using This Medicine:   · Tell your doctor if you are pregnant or breastfeeding, or if you have kidney or liver disease, glaucoma, heart or blood vessel disease, heart rhythm problems, trouble urinating, thyroid problems, or a history of schizophrenia or seizures. · For some children, teenagers, and young adults, this medicine may increase mental or emotional problems. This may lead to thoughts of suicide and violence. Talk with your doctor right away if you have any thoughts or behavior changes that concern you. Tell your doctor if you or anyone in your family has a history of bipolar disorder or suicide attempts. · This medicine may cause a serious reaction called serotonin syndrome, which can be life-threatening. · This medicine may make you dizzy or drowsy. Do not drive or do anything else that could be dangerous until you know how this medicine affects you. · Tell any doctor or dentist who treats you that you are using this medicine. You may need to stop using this medicine several days before you have surgery or medical tests. · Do not stop using this medicine suddenly. Your doctor will need to slowly decrease your dose before you stop it completely. · This medicine may make your skin more sensitive to sunlight. Wear sunscreen. Do not use sunlamps or tanning beds. · This medicine may raise or lower your blood sugar level. · Your doctor will do lab tests at regular visits to check on the effects of this medicine. Keep all appointments. · Keep all medicine out of the reach of children. Never share your medicine with anyone.   Possible Side Effects While Using This Medicine:   Call your doctor right away if you notice any of these side effects:  · Allergic reaction: Itching or hives, swelling in your face or hands, swelling or tingling in your mouth or throat, chest tightness, trouble breathing  · Agitation, irritability, sudden increase in energy, trouble sleeping  · Anxiety, restlessness, fever, sweating, muscle spasms, nausea, vomiting, diarrhea, seeing or hearing things that are not there  · Eye pain, vision changes, seeing halos around lights  · Fast, pounding, or uneven heartbeat  · Fever, sore throat, cough, chills  · Thoughts of hurting yourself or others, trouble sleeping, unusual behavior  If you notice these less serious side effects, talk with your doctor:   · Dry mouth, constipation  · Tiredness  If you notice other side effects that you think are caused by this medicine, tell your doctor. Call your doctor for medical advice about side effects. You may report side effects to FDA at 1-276-FDA-8987  © 2017 [address] [employer] Information is for End User's use only and may not be sold, redistributed or otherwise used for commercial purposes. The above information is an  only. It is not intended as medical advice for individual conditions or treatments. Talk to your doctor, nurse or pharmacist before following any medical regimen to see if it is safe and effective for you.

## 2017-07-11 NOTE — PROGRESS NOTES
Shana Mariee is a 63 y.o. female   Chief Complaint   Patient presents with    Coagulation disorder    Pain (Chronic)    pt here for INR check and states she has a hx of esophageal spasms and they have been flaring up more so. Pt would like something for this. States was dx by Gi a year or so ago but never given anything for it. Pt also with continued pain in her R leg and does have a chronic dvt. Would like refill of her pain medication for this.  checked. Coumadin level high at 3.8 see orders for new dose. Recheck 7-10 days. she is a 63y.o. year old female who presents for evalution. Reviewed PmHx, RxHx, FmHx, SocHx, AllgHx and updated and dated in the chart. Review of Systems - negative except as listed above in the HPI    Objective:     Vitals:    07/11/17 1447   BP: 148/80   Pulse: 76   Resp: 20   Temp: 97.8 °F (36.6 °C)   TempSrc: Oral   SpO2: 96%   Weight: 333 lb 6.4 oz (151.2 kg)   Height: 5' 10\" (1.778 m)       Current Outpatient Prescriptions   Medication Sig    imipramine (TOFRANIL) 25 mg tablet Take 1 Tab by mouth nightly. For esophageal spasm    warfarin (COUMADIN) 2 mg tablet 1.5 tabs PO daily except fri and mon take 2mg    oxyCODONE-acetaminophen (PERCOCET) 5-325 mg per tablet TAKE 1 TABLET BY MOUTH EVERY 8 HOURS AS NEEDED FOR PAIN, MAX DAILY AMOUNT OF 3 TABLETS    albuterol (PROVENTIL VENTOLIN) 2.5 mg /3 mL (0.083 %) nebulizer solution     oxyCODONE-acetaminophen (PERCOCET) 5-325 mg per tablet Take 1 Tab by mouth every twelve (12) hours as needed for Pain. Max Daily Amount: 2 Tabs.  bumetanide (BUMEX) 1 mg tablet 2 tab PO Qam and 1 tab Qafternoon    magnesium oxide (MAG-OX) 400 mg tablet Take 1 Tab by mouth two (2) times a day.  polyethylene glycol (MIRALAX) 17 gram packet Take 17 g by mouth daily as needed.  senna (SENNA) 8.6 mg tablet Take 2 Tabs by mouth nightly.  docusate sodium (COLACE) 100 mg capsule Take 100 mg by mouth nightly.     insulin aspart protamine/insulin aspart (NOVOLOG MIX 70-30) 100 unit/mL (70-30) injection by SubCUTAneous route. Uses sliding scale    tiotropium-olodaterol (STIOLTO RESPIMAT) 2.5-2.5 mcg/actuation mist Take 2 Puffs by inhalation Daily (before lunch). Uses daily at 11 am     ondansetron (ZOFRAN ODT) 4 mg disintegrating tablet Take 1 Tab by mouth every six (6) hours as needed for Nausea. (Patient taking differently: Take 4 mg by mouth as needed for Nausea.)    albuterol-ipratropium (DUONEB) 2.5 mg-0.5 mg/3 ml nebu 3 mL by Nebulization route daily as needed. Takes about 4 days a week    sucralfate (CARAFATE) 1 gram tablet Take 1 g by mouth Before breakfast, lunch, dinner and at bedtime.  fluticasone (FLONASE) 50 mcg/actuation nasal spray 2 Sprays by Both Nostrils route nightly as needed.  calcitRIOL (ROCALTROL) 0.25 mcg capsule Take 0.25 mcg by mouth two (2) days a week. Patient takes on Monday and Thursday    isosorbide mononitrate ER (IMDUR) 120 mg CR tablet TAKE 1 TABLET BY MOUTH EVERY DAY    carvedilol (COREG) 25 mg tablet TAKE 1 TABLET BY MOUTH TWICE A DAY    pantoprazole (PROTONIX) 40 mg tablet Take 1 Tab by mouth daily. Indications: GASTROESOPHAGEAL REFLUX    nitroglycerin (NITROSTAT) 0.3 mg SL tablet 1 Tab by SubLINGual route every five (5) minutes as needed for Chest Pain.  ergocalciferol (VITAMIN D2) 50,000 unit capsule Take 50,000 Units by mouth every Tuesday and Thursday.  aspirin 81 mg tablet Take 81 mg by mouth daily.  atorvastatin (LIPITOR) 80 mg tablet Take 80 mg by mouth every evening. No current facility-administered medications for this visit.         Physical Examination: General appearance - alert, well appearing, and in no distress  Mental status - alert, oriented to person, place, and time  Eyes - pupils equal and reactive, extraocular eye movements intact  Chest - clear to auscultation, no wheezes, rales or rhonchi, symmetric air entry  Heart - normal rate, regular rhythm, normal S1, S2, no murmurs, rubs, clicks or gallops      Assessment/ Plan:   Schuyler Deras was seen today for coagulation disorder and pain (chronic). Diagnoses and all orders for this visit:    Esophageal spasm  -     imipramine (TOFRANIL) 25 mg tablet; Take 1 Tab by mouth nightly. For esophageal spasm    Deep vein thrombosis (DVT) of proximal lower extremity, unspecified chronicity, unspecified laterality (HCC)  -     warfarin (COUMADIN) 2 mg tablet; 1.5 tabs PO daily except fri and mon take 2mg  -     oxyCODONE-acetaminophen (PERCOCET) 5-325 mg per tablet; TAKE 1 TABLET BY MOUTH EVERY 8 HOURS AS NEEDED FOR PAIN, MAX DAILY AMOUNT OF 3 TABLETS    Chronic pain of right knee    Medication monitoring encounter  -     AMB POC PT/INR       Follow-up Disposition:  Return in about 10 days (around 7/21/2017), or if symptoms worsen or fail to improve. I have discussed the diagnosis with the patient and the intended plan as seen in the above orders. The patient has received an after-visit summary and questions were answered concerning future plans. Pt conveyed understanding of plan.     Medication Side Effects and Warnings were discussed with patient      Dalila Templeton, DO

## 2017-07-25 ENCOUNTER — TELEPHONE (OUTPATIENT)
Dept: FAMILY MEDICINE CLINIC | Age: 62
End: 2017-07-25

## 2017-07-25 NOTE — TELEPHONE ENCOUNTER
I'm not sure what she is looking for medication wise but she would have had to of gotten from ED since they evaluated her

## 2017-07-25 NOTE — TELEPHONE ENCOUNTER
Patient  just leaving [facility] for foot pain. They discharged patient without medication. Xrays ok. But has fluid in it. She is looking for something to be called in today to help. Her appt is andie with Dr. Annemarie Levy at 2:45. I offered her an appointment with Dr. Jasmine Cooper this afternoon, but she refused.  .     Call back  [phone]

## 2017-07-26 ENCOUNTER — OFFICE VISIT (OUTPATIENT)
Dept: FAMILY MEDICINE CLINIC | Age: 62
End: 2017-07-26

## 2017-07-26 ENCOUNTER — HOSPITAL ENCOUNTER (OUTPATIENT)
Dept: LAB | Age: 62
Discharge: HOME | End: 2017-07-26
Payer: MEDICARE

## 2017-07-26 VITALS
HEIGHT: 70 IN | RESPIRATION RATE: 22 BRPM | TEMPERATURE: 98.6 F | OXYGEN SATURATION: 96 % | SYSTOLIC BLOOD PRESSURE: 150 MMHG | HEART RATE: 86 BPM | DIASTOLIC BLOOD PRESSURE: 80 MMHG

## 2017-07-26 DIAGNOSIS — M79.672 LEFT FOOT PAIN: ICD-10-CM

## 2017-07-26 DIAGNOSIS — Z79.01 CHRONIC ANTICOAGULATION: Primary | ICD-10-CM

## 2017-07-26 LAB
INR BLD: 1.3
PT POC: 15.7 SECONDS
VALID INTERNAL CONTROL?: YES

## 2017-07-26 PROCEDURE — 84550 ASSAY OF BLOOD/URIC ACID: CPT

## 2017-07-26 RX ORDER — OXYCODONE AND ACETAMINOPHEN 10; 325 MG/1; MG/1
1 TABLET ORAL
Qty: 20 TAB | Refills: 0 | Status: SHIPPED | OUTPATIENT
Start: 2017-07-26 | End: 2017-09-15 | Stop reason: ALTCHOICE

## 2017-07-26 RX ORDER — PREDNISONE 10 MG/1
TABLET ORAL
Qty: 21 TAB | Refills: 0 | Status: SHIPPED | OUTPATIENT
Start: 2017-07-26 | End: 2017-09-05 | Stop reason: ALTCHOICE

## 2017-07-26 RX ORDER — KETOROLAC TROMETHAMINE 30 MG/ML
30 INJECTION, SOLUTION INTRAMUSCULAR; INTRAVENOUS ONCE
Qty: 1 VIAL | Refills: 0
Start: 2017-07-26 | End: 2017-07-26

## 2017-07-26 NOTE — PROGRESS NOTES
Pt here for hosp f/u for left foot pain  Was seen rhys yesterday, had imaging done- WNL   Also INR check

## 2017-07-26 NOTE — PROGRESS NOTES
Sonal Garrison is a 63 y.o. female   Chief Complaint   Patient presents with    Follow Up Chronic Condition    pt for repeat INR last at 3.8, elevated. Today it is low but pt is not taking as Rx was taking 3mg 2 days a week and 2 mg 5 days a week which is opposite of what I told her. Pt states her L foot started hurting her on Monday and pain is currently a 10/10 and was seen at [facility] ED lasdt night and X-ray was taken and told not broken and sent home. Pt denies a hx of gout but does have renal insufficiency will check a uric acid. Pt denies any trauma. Pt had fractured her foot 5 years prior. Pt appears in pain in the office. Has ankle wrapped but states does not help at all.      she is a 63y.o. year old female who presents for evalution. Reviewed PmHx, RxHx, FmHx, SocHx, AllgHx and updated and dated in the chart. Review of Systems - negative except as listed above in the HPI    Objective:     Vitals:    07/26/17 1452   BP: 150/80   Pulse: 86   Resp: 22   Temp: 98.6 °F (37 °C)   TempSrc: Oral   SpO2: 96%   Height: 5' 10\" (1.778 m)       Current Outpatient Prescriptions   Medication Sig    ketorolac (TORADOL) 30 mg/mL (1 mL) injection 1 mL by IntraVENous route once for 1 dose.  predniSONE (STERAPRED DS) 10 mg dose pack See administration instruction per 10mg dose pack    oxyCODONE-acetaminophen (PERCOCET 10)  mg per tablet Take 1 Tab by mouth every four (4) hours as needed for Pain. Max Daily Amount: 6 Tabs.  imipramine (TOFRANIL) 25 mg tablet Take 1 Tab by mouth nightly. For esophageal spasm    oxyCODONE-acetaminophen (PERCOCET) 5-325 mg per tablet TAKE 1 TABLET BY MOUTH EVERY 8 HOURS AS NEEDED FOR PAIN, MAX DAILY AMOUNT OF 3 TABLETS    albuterol (PROVENTIL VENTOLIN) 2.5 mg /3 mL (0.083 %) nebulizer solution     oxyCODONE-acetaminophen (PERCOCET) 5-325 mg per tablet Take 1 Tab by mouth every twelve (12) hours as needed for Pain. Max Daily Amount: 2 Tabs.     bumetanide (BUMEX) 1 mg tablet 2 tab PO Qam and 1 tab Qafternoon    magnesium oxide (MAG-OX) 400 mg tablet Take 1 Tab by mouth two (2) times a day.  polyethylene glycol (MIRALAX) 17 gram packet Take 17 g by mouth daily as needed.  senna (SENNA) 8.6 mg tablet Take 2 Tabs by mouth nightly.  docusate sodium (COLACE) 100 mg capsule Take 100 mg by mouth nightly.  insulin aspart protamine/insulin aspart (NOVOLOG MIX 70-30) 100 unit/mL (70-30) injection by SubCUTAneous route. Uses sliding scale    tiotropium-olodaterol (STIOLTO RESPIMAT) 2.5-2.5 mcg/actuation mist Take 2 Puffs by inhalation Daily (before lunch). Uses daily at 11 am     ondansetron (ZOFRAN ODT) 4 mg disintegrating tablet Take 1 Tab by mouth every six (6) hours as needed for Nausea. (Patient taking differently: Take 4 mg by mouth as needed for Nausea.)    albuterol-ipratropium (DUONEB) 2.5 mg-0.5 mg/3 ml nebu 3 mL by Nebulization route daily as needed. Takes about 4 days a week    sucralfate (CARAFATE) 1 gram tablet Take 1 g by mouth Before breakfast, lunch, dinner and at bedtime.  fluticasone (FLONASE) 50 mcg/actuation nasal spray 2 Sprays by Both Nostrils route nightly as needed.  calcitRIOL (ROCALTROL) 0.25 mcg capsule Take 0.25 mcg by mouth two (2) days a week. Patient takes on Monday and Thursday    isosorbide mononitrate ER (IMDUR) 120 mg CR tablet TAKE 1 TABLET BY MOUTH EVERY DAY    carvedilol (COREG) 25 mg tablet TAKE 1 TABLET BY MOUTH TWICE A DAY    pantoprazole (PROTONIX) 40 mg tablet Take 1 Tab by mouth daily. Indications: GASTROESOPHAGEAL REFLUX    nitroglycerin (NITROSTAT) 0.3 mg SL tablet 1 Tab by SubLINGual route every five (5) minutes as needed for Chest Pain.  ergocalciferol (VITAMIN D2) 50,000 unit capsule Take 50,000 Units by mouth every Tuesday and Thursday.  aspirin 81 mg tablet Take 81 mg by mouth daily.  atorvastatin (LIPITOR) 80 mg tablet Take 80 mg by mouth every evening.       warfarin (COUMADIN) 2 mg tablet 1.5 tabs PO daily except fri and mon take 2mg     No current facility-administered medications for this visit. Physical Examination: General appearance - alert, well appearing, and in no distress  Mental status - alert, oriented to person, place, and time, pt appears in acute pain  Eyes - pupils equal and reactive, extraocular eye movements intact  Chest - clear to auscultation, no wheezes, rales or rhonchi, symmetric air entry  Heart - normal rate, regular rhythm, normal S1, S2, no murmurs, rubs, clicks or gallops  Musculoskeletal - edema of L ankle with diffuse ttp but without erythema      Assessment/ Plan:   Diagnoses and all orders for this visit:    1. Chronic anticoagulation  -     AMB POC PT/INR  Take as Rx if not getting into range will consider switch to eliquis 2.5 bid  2. Left foot pain  -     URIC ACID  -     ketorolac (TORADOL) 30 mg/mL (1 mL) injection; 1 mL by IntraVENous route once for 1 dose. -     KETOROLAC TROMETHAMINE INJ  -     NM THER/PROPH/DIAG INJECTION, SUBCUT/IM  -     MRI FOOT LT WO CONT; Future  -     predniSONE (STERAPRED DS) 10 mg dose pack; See administration instruction per 10mg dose pack  -     oxyCODONE-acetaminophen (PERCOCET 10)  mg per tablet; Take 1 Tab by mouth every four (4) hours as needed for Pain. Max Daily Amount: 6 Tabs. Follow-up Disposition:  Return in about 1 week (around 8/2/2017), or if symptoms worsen or fail to improve. I have discussed the diagnosis with the patient and the intended plan as seen in the above orders. The patient has received an after-visit summary and questions were answered concerning future plans. Pt conveyed understanding of plan.     Medication Side Effects and Warnings were discussed with patient      Rangel Lockwood DO

## 2017-07-26 NOTE — MR AVS SNAPSHOT
Visit Information Date & Time Provider Department Dept. Phone Encounter #  
 7/26/2017  2:45 PM [address] [facility], Ifeoma Lawrence [phone] [id number] Follow-up Instructions Return in about 1 week (around 8/2/2017), or if symptoms worsen or fail to improve. Your Appointments 8/18/2017  4:00 PM  
ESTABLISHED PATIENT with Lakeisha Johnson MD  
[facility] ([facility] SCHEDULING) Appt Note: 4 mo fup  
 [address] [facility] [place] [address] [facility]  
[address] [facility] [place] [id number] [address] Upcoming Health Maintenance Date Due  
 FOOT EXAM Q1 12/14/1967 MICROALBUMIN Q1 12/14/1967 EYE EXAM RETINAL OR DILATED Q1 12/14/1967 Pneumococcal 19-64 Medium Risk (1 of 1 - PPSV23) 12/14/1976 DTaP/Tdap/Td series (1 - Tdap) 12/14/1978 PAP AKA CERVICAL CYTOLOGY 12/14/1978 BREAST CANCER SCRN MAMMOGRAM 12/14/2007 LIPID PANEL Q1 11/9/2015 INFLUENZA AGE 9 TO ADULT 8/1/2017 HEMOGLOBIN A1C Q6M 9/3/2017 FOBT Q 1 YEAR AGE 50-75 2/18/2018 Allergies as of 7/26/2017  Review Complete On: 7/26/2017 By: [address] [facility], DO Severity Noted Reaction Type Reactions Contrast Dye [Iodine] High 02/28/2011   Systemic Anaphylaxis Levaquin [Levofloxacin]  10/13/2013    Nausea and Vomiting Morphine  02/28/2011   Side Effect Hives, Itching Current Immunizations  Never Reviewed No immunizations on file. Not reviewed this visit You Were Diagnosed With   
  
 Codes Comments Chronic anticoagulation    -  Primary ICD-10-CM: Z79.01 
ICD-9-CM: V58.61 Left foot pain     ICD-10-CM: B12.983 ICD-9-CM: 729.5 Vitals BP Pulse Temp Resp Height(growth percentile) SpO2  
 150/80 86 98.6 °F (37 °C) (Oral) 22 5' 10\" (1.778 m) 96% OB Status Smoking Status Hysterectomy Former Smoker Vitals History Preferred Pharmacy Pharmacy Name Phone [facility]/PHARMACY #[id number]- [address] [phone] Your Updated Medication List  
  
   
This list is accurate as of: 7/26/17  3:17 PM.  Always use your most recent med list.  
  
  
  
  
 albuterol 2.5 mg /3 mL (0.083 %) nebulizer solution Commonly known as:  PROVENTIL VENTOLIN  
  
 aspirin 81 mg tablet Take 81 mg by mouth daily. atorvastatin 80 mg tablet Commonly known as:  LIPITOR Take 80 mg by mouth every evening. bumetanide 1 mg tablet Commonly known as:  BUMEX  
2 tab PO Qam and 1 tab Qafternoon  
  
 calcitRIOL 0.25 mcg capsule Commonly known as:  ROCALTROL Take 0.25 mcg by mouth two (2) days a week. Patient takes on Monday and Thursday CARAFATE 1 gram tablet Generic drug:  sucralfate Take 1 g by mouth Before breakfast, lunch, dinner and at bedtime. carvedilol 25 mg tablet Commonly known as:  COREG  
TAKE 1 TABLET BY MOUTH TWICE A DAY  
  
 docusate sodium 100 mg capsule Commonly known as:  Jonathan Stai Take 100 mg by mouth nightly. DUONEB 2.5 mg-0.5 mg/3 ml Nebu Generic drug:  albuterol-ipratropium 3 mL by Nebulization route daily as needed. Takes about 4 days a week FLONASE 50 mcg/actuation nasal spray Generic drug:  fluticasone 2 Sprays by Both Nostrils route nightly as needed. imipramine 25 mg tablet Commonly known as:  TOFRANIL Take 1 Tab by mouth nightly. For esophageal spasm  
  
 isosorbide mononitrate  mg CR tablet Commonly known as:  IMDUR  
TAKE 1 TABLET BY MOUTH EVERY DAY  
  
 ketorolac 30 mg/mL (1 mL) injection Commonly known as:  TORADOL  
1 mL by IntraVENous route once for 1 dose.  
  
 magnesium oxide 400 mg tablet Commonly known as:  MAG-OX Take 1 Tab by mouth two (2) times a day. MIRALAX 17 gram packet Generic drug:  polyethylene glycol Take 17 g by mouth daily as needed. nitroglycerin 0.3 mg SL tablet Commonly known as:  NITROSTAT 1 Tab by SubLINGual route every five (5) minutes as needed for Chest Pain. NovoLOG Mix 70-30 100 unit/mL (70-30) injection Generic drug:  insulin aspart protamine/insulin aspart  
by SubCUTAneous route. Uses sliding scale  
  
 ondansetron 4 mg disintegrating tablet Commonly known as:  ZOFRAN ODT Take 1 Tab by mouth every six (6) hours as needed for Nausea. * oxyCODONE-acetaminophen 5-325 mg per tablet Commonly known as:  PERCOCET Take 1 Tab by mouth every twelve (12) hours as needed for Pain. Max Daily Amount: 2 Tabs. * oxyCODONE-acetaminophen 5-325 mg per tablet Commonly known as:  PERCOCET TAKE 1 TABLET BY MOUTH EVERY 8 HOURS AS NEEDED FOR PAIN, MAX DAILY AMOUNT OF 3 TABLETS  
  
 * oxyCODONE-acetaminophen  mg per tablet Commonly known as:  PERCOCET 10 Take 1 Tab by mouth every four (4) hours as needed for Pain. Max Daily Amount: 6 Tabs. pantoprazole 40 mg tablet Commonly known as:  PROTONIX Take 1 Tab by mouth daily. Indications: GASTROESOPHAGEAL REFLUX  
  
 predniSONE 10 mg dose pack Commonly known as:  STERAPRED DS See administration instruction per 10mg dose pack Senna 8.6 mg tablet Generic drug:  senna Take 2 Tabs by mouth nightly. STIOLTO RESPIMAT 2.5-2.5 mcg/actuation Mist  
Generic drug:  tiotropium-olodaterol Take 2 Puffs by inhalation Daily (before lunch). Uses daily at 11 am  
  
 VITAMIN D2 50,000 unit capsule Generic drug:  ergocalciferol Take 50,000 Units by mouth every Tuesday and Thursday. warfarin 2 mg tablet Commonly known as:  COUMADIN  
1.5 tabs PO daily except fri and mon take 2mg * Notice: This list has 3 medication(s) that are the same as other medications prescribed for you. Read the directions carefully, and ask your doctor or other care provider to review them with you. Prescriptions Printed  Refills  
 oxyCODONE-acetaminophen (PERCOCET 10)  mg per tablet 0  
 Sig: Take 1 Tab by mouth every four (4) hours as needed for Pain. Max Daily Amount: 6 Tabs. Class: Print Route: Oral  
  
Prescriptions Sent to Pharmacy Refills  
 predniSONE (STERAPRED DS) 10 mg dose pack 0 Sig: See administration instruction per 10mg dose pack Class: Normal  
 Pharmacy: [facility]/pharmacy #[id number], [address] [place] [address] #: [phone] We Performed the Following AMB POC PT/INR [62113 CPT(R)] KETOROLAC TROMETHAMINE INJ [ [facility]] WA THER/PROPH/DIAG INJECTION, SUBCUT/IM [id number] CPT(R)] URIC ACID [id number] CPT(R)] Follow-up Instructions Return in about 1 week (around 8/2/2017), or if symptoms worsen or fail to improve. To-Do List   
 07/26/2017 Imaging:  MRI FOOT LT WO CONT Introducing \[facility] [place] [facility]\"" & [facility]! Dear Jessie Parr: Thank you for requesting a [employer] account. Our records indicate that you already have an active [employer] account. You can access your account anytime at [email or web address] Did you know that you can access your hospital and ER discharge instructions at any time in [employer]? You can also review all of your test results from your hospital stay or ER visit. Additional Information If you have questions, please visit the Frequently Asked Questions section of the [employer] website at [email or web address] Remember, [employer] is NOT to be used for urgent needs. For medical emergencies, dial 911. Now available from your iPhone and Android! Please provide this summary of care documentation to your next provider. Your primary care clinician is listed as Cristiane Rogers. If you have any questions after today's visit, please call [phone].

## 2017-07-27 DIAGNOSIS — M10.371 ACUTE GOUT DUE TO RENAL IMPAIRMENT INVOLVING RIGHT ANKLE: Primary | ICD-10-CM

## 2017-07-27 LAB — URATE SERPL-MCNC: 9.8 MG/DL (ref 2.5–7.1)

## 2017-07-27 RX ORDER — FEBUXOSTAT 40 MG/1
40 TABLET, FILM COATED ORAL DAILY
Qty: 30 TAB | Refills: 1 | Status: SHIPPED | OUTPATIENT
Start: 2017-07-27 | End: 2017-08-03

## 2017-07-27 NOTE — PROGRESS NOTES
You are positive for gout and this is likely from your kidney issues. I have sent in a medication called uloric. It is taken once a day everyday but DO NOT start it until this acute attack has passed. The steroid pack should be helping you with this.

## 2017-07-30 NOTE — ED NOTES
"Chief Complaint   Patient presents with     Sinus Problem     x 1 week, patient thinks fevers the first 2 days        Initial /90 (BP Location: Left arm)  Pulse 77  Temp 98.3  F (36.8  C) (Temporal) Estimated body mass index is 31.07 kg/(m^2) as calculated from the following:    Height as of 4/6/17: 6' 2\" (1.88 m).    Weight as of 4/6/17: 242 lb (109.8 kg).  Medication Reconciliation: complete    " Patient discharged by MD. Papers given and questions answered. Home with family.

## 2017-07-31 ENCOUNTER — HOSPITAL ENCOUNTER (OUTPATIENT)
Dept: MRI IMAGING | Age: 62
Discharge: HOME | End: 2017-07-31
Attending: FAMILY MEDICINE
Payer: MEDICARE

## 2017-07-31 DIAGNOSIS — M79.672 LEFT FOOT PAIN: ICD-10-CM

## 2017-07-31 PROCEDURE — 73718 MRI LOWER EXTREMITY W/O DYE: CPT

## 2017-08-01 NOTE — PROGRESS NOTES
Your MRi is showing inflammation in the muscle of your foot along with arthritis. I would like for you to see a foot doctor.   Dr Sherrill Hayes located on the [facility] [place], [address]  Phone: [phone]

## 2017-08-03 ENCOUNTER — OFFICE VISIT (OUTPATIENT)
Dept: FAMILY MEDICINE CLINIC | Age: 62
End: 2017-08-03

## 2017-08-03 VITALS
HEIGHT: 70 IN | WEIGHT: 293 LBS | OXYGEN SATURATION: 97 % | BODY MASS INDEX: 41.95 KG/M2 | TEMPERATURE: 98.3 F | DIASTOLIC BLOOD PRESSURE: 58 MMHG | SYSTOLIC BLOOD PRESSURE: 138 MMHG | RESPIRATION RATE: 18 BRPM | HEART RATE: 76 BPM

## 2017-08-03 DIAGNOSIS — Z79.01 ON WARFARIN THERAPY: Primary | ICD-10-CM

## 2017-08-03 DIAGNOSIS — M10.372 ACUTE GOUT DUE TO RENAL IMPAIRMENT INVOLVING LEFT FOOT: ICD-10-CM

## 2017-08-03 LAB
INR BLD: 1.4
PT POC: 17.1 SECONDS
VALID INTERNAL CONTROL?: YES

## 2017-08-03 RX ORDER — ALLOPURINOL 100 MG/1
100 TABLET ORAL DAILY
Qty: 30 TAB | Refills: 5 | Status: SHIPPED | OUTPATIENT
Start: 2017-08-03 | End: 2017-09-18

## 2017-08-03 NOTE — PATIENT INSTRUCTIONS
Gout: Care Instructions  Your Care Instructions  Gout is a form of arthritis caused by a buildup of uric acid crystals in a joint. It causes sudden attacks of pain, swelling, redness, and stiffness, usually in one joint, especially the big toe. Gout usually comes on without a cause. But it can be brought on by drinking alcohol (especially beer) or eating seafood and red meat. Taking certain medicines, such as diuretics or aspirin, also can bring on an attack of gout. Taking your medicines as prescribed and following up with your doctor regularly can help you avoid gout attacks in the future. Follow-up care is a key part of your treatment and safety. Be sure to make and go to all appointments, and call your doctor if you are having problems. Its also a good idea to know your test results and keep a list of the medicines you take. How can you care for yourself at home? · If the joint is swollen, put ice or a cold pack on the area for 10 to 20 minutes at a time. Put a thin cloth between the ice and your skin. · Prop up the sore limb on a pillow when you ice it or anytime you sit or lie down during the next 3 days. Try to keep it above the level of your heart. This will help reduce swelling. · Rest sore joints. Avoid activities that put weight or strain on the joints for a few days. Take short rest breaks from your regular activities during the day. · Take your medicines exactly as prescribed. Call your doctor if you think you are having a problem with your medicine. · Take pain medicines exactly as directed. ¨ If the doctor gave you a prescription medicine for pain, take it as prescribed. ¨ If you are not taking a prescription pain medicine, ask your doctor if you can take an over-the-counter medicine. · Eat less seafood and red meat. · Check with your doctor before drinking alcohol. · Losing weight, if you are overweight, may help reduce attacks of gout. But do not go on a [employer]. \" Losing a lot of weight in a short amount of time can cause a gout attack. When should you call for help? Call your doctor now or seek immediate medical care if:  · You have a fever. · The joint is so painful you cannot use it. · You have sudden, unexplained swelling, redness, warmth, or severe pain in one or more joints. Watch closely for changes in your health, and be sure to contact your doctor if:  · You have joint pain. · Your symptoms get worse or are not improving after 2 or 3 days. Where can you learn more? Go to http://lindsay-kai.info/. Enter J449 in the search box to learn more about \"Gout: Care Instructions. \"  Current as of: October 31, 2016  Content Version: 11.3  © 6150-6022 [employer]. Care instructions adapted under license by [employer] (which disclaims liability or warranty for this information). If you have questions about a medical condition or this instruction, always ask your healthcare professional. [place] [address] any warranty or liability for your use of this information.

## 2017-08-03 NOTE — PROGRESS NOTES
Pt here for PT INR check  Also medication for gout too expensive   Results for orders placed or performed in visit on 08/03/17   AMB POC PT/INR   Result Value Ref Range    VALID INTERNAL CONTROL POC Yes     Prothrombin time (POC) 17.1 seconds    INR POC 1.4

## 2017-08-03 NOTE — PROGRESS NOTES
Nick Johnston is a 63 y.o. female   Chief Complaint   Patient presents with    Coagulation disorder    pt here for INR check. Pt is feeling much better with the steroid pack for her gout. Pt is unable to afford the uloric. Pt is taking incorrect dosing of coumadin and we have discussed her proper dosing. Will restart this and recheck in 1.5 weeks. she is a 63y.o. year old female who presents for evalution. Reviewed PmHx, RxHx, FmHx, SocHx, AllgHx and updated and dated in the chart. Review of Systems - negative except as listed above in the HPI    Objective:     Vitals:    08/03/17 1412   BP: 138/58   Pulse: 76   Resp: 18   Temp: 98.3 °F (36.8 °C)   TempSrc: Oral   SpO2: 97%   Weight: 342 lb (155.1 kg)   Height: 5' 10\" (1.778 m)       Current Outpatient Prescriptions   Medication Sig    allopurinol (ZYLOPRIM) 100 mg tablet Take 1 Tab by mouth daily.  magnesium oxide (MAG-OX) 400 mg tablet Take 1 Tab by mouth two (2) times a day.  predniSONE (STERAPRED DS) 10 mg dose pack See administration instruction per 10mg dose pack    oxyCODONE-acetaminophen (PERCOCET 10)  mg per tablet Take 1 Tab by mouth every four (4) hours as needed for Pain. Max Daily Amount: 6 Tabs.  imipramine (TOFRANIL) 25 mg tablet Take 1 Tab by mouth nightly. For esophageal spasm    warfarin (COUMADIN) 2 mg tablet 1.5 tabs PO daily except fri and mon take 2mg    oxyCODONE-acetaminophen (PERCOCET) 5-325 mg per tablet TAKE 1 TABLET BY MOUTH EVERY 8 HOURS AS NEEDED FOR PAIN, MAX DAILY AMOUNT OF 3 TABLETS    albuterol (PROVENTIL VENTOLIN) 2.5 mg /3 mL (0.083 %) nebulizer solution     oxyCODONE-acetaminophen (PERCOCET) 5-325 mg per tablet Take 1 Tab by mouth every twelve (12) hours as needed for Pain. Max Daily Amount: 2 Tabs.  bumetanide (BUMEX) 1 mg tablet 2 tab PO Qam and 1 tab Qafternoon    polyethylene glycol (MIRALAX) 17 gram packet Take 17 g by mouth daily as needed.     senna (SENNA) 8.6 mg tablet Take 2 Tabs by mouth nightly.  docusate sodium (COLACE) 100 mg capsule Take 100 mg by mouth nightly.  insulin aspart protamine/insulin aspart (NOVOLOG MIX 70-30) 100 unit/mL (70-30) injection by SubCUTAneous route. Uses sliding scale    tiotropium-olodaterol (STIOLTO RESPIMAT) 2.5-2.5 mcg/actuation mist Take 2 Puffs by inhalation Daily (before lunch). Uses daily at 11 am     ondansetron (ZOFRAN ODT) 4 mg disintegrating tablet Take 1 Tab by mouth every six (6) hours as needed for Nausea. (Patient taking differently: Take 4 mg by mouth as needed for Nausea.)    albuterol-ipratropium (DUONEB) 2.5 mg-0.5 mg/3 ml nebu 3 mL by Nebulization route daily as needed. Takes about 4 days a week    sucralfate (CARAFATE) 1 gram tablet Take 1 g by mouth Before breakfast, lunch, dinner and at bedtime.  fluticasone (FLONASE) 50 mcg/actuation nasal spray 2 Sprays by Both Nostrils route nightly as needed.  calcitRIOL (ROCALTROL) 0.25 mcg capsule Take 0.25 mcg by mouth two (2) days a week. Patient takes on Monday and Thursday    isosorbide mononitrate ER (IMDUR) 120 mg CR tablet TAKE 1 TABLET BY MOUTH EVERY DAY    carvedilol (COREG) 25 mg tablet TAKE 1 TABLET BY MOUTH TWICE A DAY    pantoprazole (PROTONIX) 40 mg tablet Take 1 Tab by mouth daily. Indications: GASTROESOPHAGEAL REFLUX    nitroglycerin (NITROSTAT) 0.3 mg SL tablet 1 Tab by SubLINGual route every five (5) minutes as needed for Chest Pain.  ergocalciferol (VITAMIN D2) 50,000 unit capsule Take 50,000 Units by mouth every Tuesday and Thursday.  aspirin 81 mg tablet Take 81 mg by mouth daily.  atorvastatin (LIPITOR) 80 mg tablet Take 80 mg by mouth every evening. No current facility-administered medications for this visit.         Physical Examination: General appearance - alert, well appearing, and in no distress  Eyes - pupils equal and reactive, extraocular eye movements intact  Chest - clear to auscultation, no wheezes, rales or rhonchi, symmetric air entry  Heart - normal rate, regular rhythm, normal S1, S2, no murmurs, rubs, clicks or gallops      Assessment/ Plan:   Diagnoses and all orders for this visit:    1. On warfarin therapy  -     AMB POC PT/INR    2. Acute gout due to renal impairment involving left foot  -     allopurinol (ZYLOPRIM) 100 mg tablet; Take 1 Tab by mouth daily. pt [place] going to [place] today and we discussed preventing DVT, taking frequent breaks and getting out of car to walk along with foot pump kelsey calf muscles while in car. Follow-up Disposition:  Return in about 10 days (around 8/13/2017), or if symptoms worsen or fail to improve. I have discussed the diagnosis with the patient and the intended plan as seen in the above orders. The patient has received an after-visit summary and questions were answered concerning future plans. Pt conveyed understanding of plan.     Medication Side Effects and Warnings were discussed with patient      Dalila Templeton, DO

## 2017-08-03 NOTE — MR AVS SNAPSHOT
Visit Information Date & Time Provider Department Dept. Phone Encounter #  
 8/3/2017  2:15 PM Ramana Seen, [address] [phone] [id number] Follow-up Instructions Return in about 10 days (around 8/13/2017), or if symptoms worsen or fail to improve. Your Appointments 8/18/2017  4:00 PM  
ESTABLISHED PATIENT with Vonda Adams MD  
[facility] ([facility] SCHEDULING) Appt Note: 4 mo fup  
 [address] [address] [address] [address] [address] [facility] Upcoming Health Maintenance Date Due  
 FOOT EXAM Q1 12/14/1967 MICROALBUMIN Q1 12/14/1967 EYE EXAM RETINAL OR DILATED Q1 12/14/1967 Pneumococcal 19-64 Medium Risk (1 of 1 - PPSV23) 12/14/1976 DTaP/Tdap/Td series (1 - Tdap) 12/14/1978 PAP AKA CERVICAL CYTOLOGY 12/14/1978 BREAST CANCER SCRN MAMMOGRAM 12/14/2007 LIPID PANEL Q1 11/9/2015 INFLUENZA AGE 9 TO ADULT 8/1/2017 HEMOGLOBIN A1C Q6M 9/3/2017 FOBT Q 1 YEAR AGE 50-75 2/18/2018 MEDICARE YEARLY EXAM 5/18/2018 Allergies as of 8/3/2017  Review Complete On: 8/3/2017 By: Ramana Seen, DO Severity Noted Reaction Type Reactions Contrast Dye [Iodine] High 02/28/2011   Systemic Anaphylaxis Levaquin [Levofloxacin]  10/13/2013    Nausea and Vomiting Morphine  02/28/2011   Side Effect Hives, Itching Current Immunizations  Never Reviewed No immunizations on file. Not reviewed this visit You Were Diagnosed With   
  
 Codes Comments On warfarin therapy    -  Primary ICD-10-CM: Z79.01 
ICD-9-CM: V58.61 Acute gout due to renal impairment involving left foot     ICD-10-CM: M10.372 ICD-9-CM: 274.10 Vitals BP Pulse Temp Resp Height(growth percentile) Weight(growth percentile) 138/58 76 98.3 °F (36.8 °C) (Oral) 18 5' 10\" (1.778 m) 342 lb (155.1 kg) SpO2 BMI OB Status Smoking Status 97% 49.07 kg/m2 Hysterectomy Former Smoker Vitals History BMI and BSA Data Body Mass Index Body Surface Area 49.07 kg/m 2 2.77 m 2 Preferred Pharmacy Pharmacy Name Phone [id number], [address] [facility] [phone] Your Updated Medication List  
  
   
This list is accurate as of: 8/3/17  2:44 PM.  Always use your most recent med list.  
  
  
  
  
 albuterol 2.5 mg /3 mL (0.083 %) nebulizer solution Commonly known as:  PROVENTIL VENTOLIN  
  
 allopurinol 100 mg tablet Commonly known as:  Damian Sprawls Take 1 Tab by mouth daily. aspirin 81 mg tablet Take 81 mg by mouth daily. atorvastatin 80 mg tablet Commonly known as:  LIPITOR Take 80 mg by mouth every evening. bumetanide 1 mg tablet Commonly known as:  BUMEX  
2 tab PO Qam and 1 tab Qafternoon  
  
 calcitRIOL 0.25 mcg capsule Commonly known as:  ROCALTROL Take 0.25 mcg by mouth two (2) days a week. Patient takes on Monday and Thursday CARAFATE 1 gram tablet Generic drug:  sucralfate Take 1 g by mouth Before breakfast, lunch, dinner and at bedtime. carvedilol 25 mg tablet Commonly known as:  COREG  
TAKE 1 TABLET BY MOUTH TWICE A DAY  
  
 docusate sodium 100 mg capsule Commonly known as:  Yovany Roly Take 100 mg by mouth nightly. DUONEB 2.5 mg-0.5 mg/3 ml Nebu Generic drug:  albuterol-ipratropium 3 mL by Nebulization route daily as needed. Takes about 4 days a week FLONASE 50 mcg/actuation nasal spray Generic drug:  fluticasone 2 Sprays by Both Nostrils route nightly as needed. imipramine 25 mg tablet Commonly known as:  TOFRANIL Take 1 Tab by mouth nightly. For esophageal spasm  
  
 isosorbide mononitrate  mg CR tablet Commonly known as:  IMDUR  
TAKE 1 TABLET BY MOUTH EVERY DAY  
  
 magnesium oxide 400 mg tablet Commonly known as:  MAG-OX Take 1 Tab by mouth two (2) times a day. MIRALAX 17 gram packet Generic drug:  polyethylene glycol Take 17 g by mouth daily as needed. nitroglycerin 0.3 mg SL tablet Commonly known as:  NITROSTAT  
1 Tab by SubLINGual route every five (5) minutes as needed for Chest Pain. NovoLOG Mix 70-30 100 unit/mL (70-30) injection Generic drug:  insulin aspart protamine/insulin aspart  
by SubCUTAneous route. Uses sliding scale  
  
 ondansetron 4 mg disintegrating tablet Commonly known as:  ZOFRAN ODT Take 1 Tab by mouth every six (6) hours as needed for Nausea. * oxyCODONE-acetaminophen 5-325 mg per tablet Commonly known as:  PERCOCET Take 1 Tab by mouth every twelve (12) hours as needed for Pain. Max Daily Amount: 2 Tabs. * oxyCODONE-acetaminophen 5-325 mg per tablet Commonly known as:  PERCOCET TAKE 1 TABLET BY MOUTH EVERY 8 HOURS AS NEEDED FOR PAIN, MAX DAILY AMOUNT OF 3 TABLETS  
  
 * oxyCODONE-acetaminophen  mg per tablet Commonly known as:  PERCOCET 10 Take 1 Tab by mouth every four (4) hours as needed for Pain. Max Daily Amount: 6 Tabs. pantoprazole 40 mg tablet Commonly known as:  PROTONIX Take 1 Tab by mouth daily. Indications: GASTROESOPHAGEAL REFLUX  
  
 predniSONE 10 mg dose pack Commonly known as:  STERAPRED DS See administration instruction per 10mg dose pack Senna 8.6 mg tablet Generic drug:  senna Take 2 Tabs by mouth nightly. STIOLTO RESPIMAT 2.5-2.5 mcg/actuation Mist  
Generic drug:  tiotropium-olodaterol Take 2 Puffs by inhalation Daily (before lunch). Uses daily at 11 am  
  
 VITAMIN D2 50,000 unit capsule Generic drug:  ergocalciferol Take 50,000 Units by mouth every Tuesday and Thursday. warfarin 2 mg tablet Commonly known as:  COUMADIN  
1.5 tabs PO daily except fri and mon take 2mg * Notice:   This list has 3 medication(s) that are the same as other medications prescribed for you. Read the directions carefully, and ask your doctor or other care provider to review them with you. Prescriptions Sent to Pharmacy Refills  
 allopurinol (ZYLOPRIM) 100 mg tablet 5 Sig: Take 1 Tab by mouth daily. Class: Normal  
 Pharmacy: [id number] [place], [address] [place] [address] #: [phone] Route: Oral  
  
We Performed the Following AMB POC PT/INR [31787 CPT(R)] Follow-up Instructions Return in about 10 days (around 8/13/2017), or if symptoms worsen or fail to improve. Patient Instructions Gout: Care Instructions Your Care Instructions Gout is a form of arthritis caused by a buildup of uric acid crystals in a joint. It causes sudden attacks of pain, swelling, redness, and stiffness, usually in one joint, especially the big toe. Gout usually comes on without a cause. But it can be brought on by drinking alcohol (especially beer) or eating seafood and red meat. Taking certain medicines, such as diuretics or aspirin, also can bring on an attack of gout. Taking your medicines as prescribed and following up with your doctor regularly can help you avoid gout attacks in the future. Follow-up care is a key part of your treatment and safety. Be sure to make and go to all appointments, and call your doctor if you are having problems. Its also a good idea to know your test results and keep a list of the medicines you take. How can you care for yourself at home? · If the joint is swollen, put ice or a cold pack on the area for 10 to 20 minutes at a time. Put a thin cloth between the ice and your skin. · Prop up the sore limb on a pillow when you ice it or anytime you sit or lie down during the next 3 days. Try to keep it above the level of your heart. This will help reduce swelling. · Rest sore joints.  Avoid activities that put weight or strain on the joints for a few days. Take short rest breaks from your regular activities during the day. · Take your medicines exactly as prescribed. Call your doctor if you think you are having a problem with your medicine. · Take pain medicines exactly as directed. ¨ If the doctor gave you a prescription medicine for pain, take it as prescribed. ¨ If you are not taking a prescription pain medicine, ask your doctor if you can take an over-the-counter medicine. · Eat less seafood and red meat. · Check with your doctor before drinking alcohol. · Losing weight, if you are overweight, may help reduce attacks of gout. But do not go on a [employer]. \" Losing a lot of weight in a short amount of time can cause a gout attack. When should you call for help? Call your doctor now or seek immediate medical care if: 
· You have a fever. · The joint is so painful you cannot use it. · You have sudden, unexplained swelling, redness, warmth, or severe pain in one or more joints. Watch closely for changes in your health, and be sure to contact your doctor if: 
· You have joint pain. · Your symptoms get worse or are not improving after 2 or 3 days. Where can you learn more? Go to http://lindsay-kai.info/. Enter O359 in the search box to learn more about \"Gout: Care Instructions. \" Current as of: October 31, 2016 Content Version: 11.3 © 9284-7274 [employer]. Care instructions adapted under license by [employer] (which disclaims liability or warranty for this information). If you have questions about a medical condition or this instruction, always ask your healthcare professional. [address] [address] any warranty or liability for your use of this information. Introducing [facility]! Dear Mayda Torres: Thank you for requesting a [employer] account. Our records indicate that you already have an active [employer] account.   You can access your account anytime at [email or web address] Did you know that you can access your hospital and ER discharge instructions at any time in [employer] You can also review all of your test results from your hospital stay or ER visit. Additional Information If you have questions, please visit the Frequently Asked Questions section of the [employer] website at [email or web address] Remember, [employer] is NOT to be used for urgent needs. For medical emergencies, dial 911. Now available from your iPhone and Android! Please provide this summary of care documentation to your next provider. Your primary care clinician is listed as [facility]. If you have any questions after today's visit, please call [phone].

## 2017-08-15 ENCOUNTER — OFFICE VISIT (OUTPATIENT)
Dept: FAMILY MEDICINE CLINIC | Age: 62
End: 2017-08-15

## 2017-08-15 VITALS
WEIGHT: 293 LBS | OXYGEN SATURATION: 96 % | HEIGHT: 70 IN | TEMPERATURE: 98.2 F | HEART RATE: 74 BPM | SYSTOLIC BLOOD PRESSURE: 134 MMHG | BODY MASS INDEX: 41.95 KG/M2 | DIASTOLIC BLOOD PRESSURE: 60 MMHG | RESPIRATION RATE: 20 BRPM

## 2017-08-15 DIAGNOSIS — J44.9 COPD, SEVERE (HCC): ICD-10-CM

## 2017-08-15 DIAGNOSIS — I25.10 ATHEROSCLEROSIS OF NATIVE CORONARY ARTERY OF NATIVE HEART WITHOUT ANGINA PECTORIS: Chronic | ICD-10-CM

## 2017-08-15 DIAGNOSIS — I27.20 PULMONARY HTN (HCC): Chronic | ICD-10-CM

## 2017-08-15 DIAGNOSIS — R09.81 SINUS CONGESTION: ICD-10-CM

## 2017-08-15 DIAGNOSIS — M17.0 PRIMARY OSTEOARTHRITIS OF BOTH KNEES: ICD-10-CM

## 2017-08-15 DIAGNOSIS — I82.4Y9 DEEP VEIN THROMBOSIS (DVT) OF PROXIMAL LOWER EXTREMITY, UNSPECIFIED CHRONICITY, UNSPECIFIED LATERALITY (HCC): Primary | ICD-10-CM

## 2017-08-15 DIAGNOSIS — N18.4 CKD (CHRONIC KIDNEY DISEASE) STAGE 4, GFR 15-29 ML/MIN (HCC): Chronic | ICD-10-CM

## 2017-08-15 DIAGNOSIS — I25.111 CORONARY ARTERY DISEASE INVOLVING NATIVE CORONARY ARTERY OF NATIVE HEART WITH ANGINA PECTORIS WITH DOCUMENTED SPASM (HCC): ICD-10-CM

## 2017-08-15 NOTE — PROGRESS NOTES
Pt here for PT INR check  Also needs new rx for electric scooter, using new company   Also headache for past few days

## 2017-08-15 NOTE — PROGRESS NOTES
Sonal Garrison is a 63 y.o. female   Chief Complaint   Patient presents with    Coagulation disorder    pt here for INR check and needs a new letter and Rx for a scooter. INR is 2.0, will continue with current dosing and recheck 1 month. Pt otherwise doing relatively well. Pt also with sinus congestion, has flonase and is starting to use. Pt is also on chronic oxygen therapy  she is a 63y.o. year old female who presents for evalution. Reviewed PmHx, RxHx, FmHx, SocHx, AllgHx and updated and dated in the chart. Review of Systems - negative except as listed above in the HPI    Objective:     Vitals:    08/15/17 1423   BP: 134/60   Pulse: 74   Resp: 20   Temp: 98.2 °F (36.8 °C)   TempSrc: Oral   SpO2: 96%   Weight: 339 lb (153.8 kg)   Height: 5' 10\" (1.778 m)       Current Outpatient Prescriptions   Medication Sig    allopurinol (ZYLOPRIM) 100 mg tablet Take 1 Tab by mouth daily.  predniSONE (STERAPRED DS) 10 mg dose pack See administration instruction per 10mg dose pack    oxyCODONE-acetaminophen (PERCOCET 10)  mg per tablet Take 1 Tab by mouth every four (4) hours as needed for Pain. Max Daily Amount: 6 Tabs.  imipramine (TOFRANIL) 25 mg tablet Take 1 Tab by mouth nightly. For esophageal spasm    warfarin (COUMADIN) 2 mg tablet 1.5 tabs PO daily except fri and mon take 2mg    oxyCODONE-acetaminophen (PERCOCET) 5-325 mg per tablet TAKE 1 TABLET BY MOUTH EVERY 8 HOURS AS NEEDED FOR PAIN, MAX DAILY AMOUNT OF 3 TABLETS    albuterol (PROVENTIL VENTOLIN) 2.5 mg /3 mL (0.083 %) nebulizer solution     oxyCODONE-acetaminophen (PERCOCET) 5-325 mg per tablet Take 1 Tab by mouth every twelve (12) hours as needed for Pain. Max Daily Amount: 2 Tabs.  bumetanide (BUMEX) 1 mg tablet 2 tab PO Qam and 1 tab Qafternoon    magnesium oxide (MAG-OX) 400 mg tablet Take 1 Tab by mouth two (2) times a day.     ondansetron (ZOFRAN ODT) 4 mg disintegrating tablet Take 1 Tab by mouth every six (6) hours as needed for Nausea. (Patient taking differently: Take 4 mg by mouth as needed for Nausea.)    albuterol-ipratropium (DUONEB) 2.5 mg-0.5 mg/3 ml nebu 3 mL by Nebulization route daily as needed. Takes about 4 days a week    isosorbide mononitrate ER (IMDUR) 120 mg CR tablet TAKE 1 TABLET BY MOUTH EVERY DAY    carvedilol (COREG) 25 mg tablet TAKE 1 TABLET BY MOUTH TWICE A DAY    pantoprazole (PROTONIX) 40 mg tablet Take 1 Tab by mouth daily. Indications: GASTROESOPHAGEAL REFLUX    nitroglycerin (NITROSTAT) 0.3 mg SL tablet 1 Tab by SubLINGual route every five (5) minutes as needed for Chest Pain.  aspirin 81 mg tablet Take 81 mg by mouth daily.  polyethylene glycol (MIRALAX) 17 gram packet Take 17 g by mouth daily as needed.  senna (SENNA) 8.6 mg tablet Take 2 Tabs by mouth nightly.  docusate sodium (COLACE) 100 mg capsule Take 100 mg by mouth nightly.  insulin aspart protamine/insulin aspart (NOVOLOG MIX 70-30) 100 unit/mL (70-30) injection by SubCUTAneous route. Uses sliding scale    tiotropium-olodaterol (STIOLTO RESPIMAT) 2.5-2.5 mcg/actuation mist Take 2 Puffs by inhalation Daily (before lunch). Uses daily at 11 am     sucralfate (CARAFATE) 1 gram tablet Take 1 g by mouth Before breakfast, lunch, dinner and at bedtime.  fluticasone (FLONASE) 50 mcg/actuation nasal spray 2 Sprays by Both Nostrils route nightly as needed.  calcitRIOL (ROCALTROL) 0.25 mcg capsule Take 0.25 mcg by mouth two (2) days a week. Patient takes on Monday and Thursday    ergocalciferol (VITAMIN D2) 50,000 unit capsule Take 50,000 Units by mouth every Tuesday and Thursday.  atorvastatin (LIPITOR) 80 mg tablet Take 80 mg by mouth every evening. No current facility-administered medications for this visit.         Physical Examination: General appearance - alert, well appearing, and in no distress  Mental status - alert, oriented to person, place, and time  Eyes - pupils equal and reactive, extraocular eye movements intact  Nose - mucosal congestion  Chest - clear to auscultation, no wheezes, rales or rhonchi, symmetric air entry  Heart - normal rate, regular rhythm, normal S1, S2, no murmurs, rubs, clicks or gallops      Assessment/ Plan:   Diagnoses and all orders for this visit:    1. Deep vein thrombosis (DVT) of proximal lower extremity, unspecified chronicity, unspecified laterality ([facility])  -     AMB POC PT/INR    2. CKD (chronic kidney disease) stage 4, GFR 15-29 ml/min ([facility])    3. COPD, severe ([facility] [address]    4. Atherosclerosis of native coronary artery of native heart without angina pectoris    5. Pulmonary HTN ([facility] [address]  Rx for scooter written and will fax with letter  6. Sinus congestion  Sinus rinses daily     Follow-up Disposition:  Return in about 1 month (around 9/15/2017), or if symptoms worsen or fail to improve. I have discussed the diagnosis with the patient and the intended plan as seen in the above orders. The patient has received an after-visit summary and questions were answered concerning future plans. Pt conveyed understanding of plan.     Medication Side Effects and Warnings were discussed with patient      Bruna Zhang, DO

## 2017-08-15 NOTE — LETTER
8/15/2017 2:44 PM 
 
Ms. Granados Dire [address] [id number] To Whom It May Concern, 
 
Miss Neeru Montilla would benefit significantly from a motorized scooter. She has a difficult time ambulating due to her legs giving out on her. She is unable to use a walker or cane for any length of time and patient is at a very high risk for falls. She is unable to use a manual wheelchair due to multiple medical issues including but not limited to, CHF, Pulmonary htn, Angina, Diabetes, Chronic Kidney disease etc.  If you have any further questions please have Miss Neeru Montilla contact my office. Sincerely, [address] [facility] [address], DO

## 2017-08-16 ENCOUNTER — DOCUMENTATION ONLY (OUTPATIENT)
Dept: FAMILY MEDICINE CLINIC | Age: 62
End: 2017-08-16

## 2017-08-16 LAB
INR BLD: 2
PT POC: 23.6 SECONDS
VALID INTERNAL CONTROL?: YES

## 2017-08-16 NOTE — PROGRESS NOTES
Patient Home Monitoring order for a scooter was placed on Dr. Berkley Gutierrez desk to be processed.

## 2017-08-18 ENCOUNTER — OFFICE VISIT (OUTPATIENT)
Dept: CARDIOLOGY CLINIC | Age: 62
End: 2017-08-18

## 2017-08-18 VITALS
DIASTOLIC BLOOD PRESSURE: 78 MMHG | WEIGHT: 293 LBS | RESPIRATION RATE: 16 BRPM | HEIGHT: 70 IN | SYSTOLIC BLOOD PRESSURE: 122 MMHG | HEART RATE: 74 BPM | BODY MASS INDEX: 41.95 KG/M2

## 2017-08-18 DIAGNOSIS — I25.118 ATHEROSCLEROSIS OF NATIVE CORONARY ARTERY OF NATIVE HEART WITH STABLE ANGINA PECTORIS (HCC): Chronic | ICD-10-CM

## 2017-08-18 DIAGNOSIS — E11.22 TYPE 2 DIABETES MELLITUS WITH STAGE 3 CHRONIC KIDNEY DISEASE, UNSPECIFIED LONG TERM INSULIN USE STATUS: Chronic | ICD-10-CM

## 2017-08-18 DIAGNOSIS — I50.32 CHRONIC DIASTOLIC HEART FAILURE (HCC): Primary | Chronic | ICD-10-CM

## 2017-08-18 DIAGNOSIS — I27.20 PULMONARY HTN (HCC): Chronic | ICD-10-CM

## 2017-08-18 DIAGNOSIS — I82.5Z1 CHRONIC DEEP VEIN THROMBOSIS (DVT) OF DISTAL VEIN OF RIGHT LOWER EXTREMITY (HCC): ICD-10-CM

## 2017-08-18 DIAGNOSIS — N18.3 TYPE 2 DIABETES MELLITUS WITH STAGE 3 CHRONIC KIDNEY DISEASE, UNSPECIFIED LONG TERM INSULIN USE STATUS: Chronic | ICD-10-CM

## 2017-08-18 DIAGNOSIS — E66.01 MORBID OBESITY DUE TO EXCESS CALORIES (HCC): Chronic | ICD-10-CM

## 2017-08-18 DIAGNOSIS — Z79.01 WARFARIN ANTICOAGULATION: ICD-10-CM

## 2017-08-18 DIAGNOSIS — I10 ESSENTIAL HYPERTENSION: Chronic | ICD-10-CM

## 2017-08-18 DIAGNOSIS — Z99.89 OSA ON CPAP: Chronic | ICD-10-CM

## 2017-08-18 DIAGNOSIS — J44.9 COPD, SEVERE (HCC): ICD-10-CM

## 2017-08-18 DIAGNOSIS — J84.9 ILD (INTERSTITIAL LUNG DISEASE) (HCC): ICD-10-CM

## 2017-08-18 DIAGNOSIS — G47.33 OSA ON CPAP: Chronic | ICD-10-CM

## 2017-08-18 DIAGNOSIS — J84.9 INTERSTITIAL LUNG DISEASE (HCC): Chronic | ICD-10-CM

## 2017-08-18 NOTE — PROGRESS NOTES
History of Present Illness    Nick Johnston is a 63 y.o. female. Last seen 4 months ago.      Problem List  Date Reviewed: 8/15/2017          Codes Class Noted    Angina, class III ([facility] ICD-10-CM: I20.9  ICD-9-CM: 413.9  8/9/2013        Chronic diastolic heart failure (HCC) (Chronic) ICD-10-CM: I50.32  ICD-9-CM: 428.32  10/5/2012        Interstitial lung disease ([facility] (Chronic) ICD-10-CM: J84.9  ICD-9-CM: 362  2/28/2011        CAD (coronary Artery Disease) Native Artery (Chronic) ICD-10-CM: I25.10  ICD-9-CM: 414.01  2/16/2011    Overview Addendum 10/5/2012  7:33 AM by PRASHANT Lane     [place] 2004  1v CAD by cath - PCI OM with SAL  Cath 5/2004 - patent stent, no new disease  Lexiscan cardiolite 12/09- normal perfusion, EF 66%  Cath: 3/19/12: PA 55/30 mean 41, wedge 26, RA 24, EDP 35, LVG 55%, LM normal, LAD normal, LCX - OM1 patent stent, OM2 prox 85% long, % with L to R collaterals                ILD (interstitial lung disease) ([facility] ICD-10-CM: J84.9  ICD-9-CM: 515  8/20/2017        Warfarin anticoagulation ICD-10-CM: Z79.01  ICD-9-CM: V58.61  8/20/2017        COPD, severe ([facility] ICD-10-CM: J44.9  ICD-9-CM: 710  6/21/2017        Decreased ambulation status ICD-10-CM: R68.89  ICD-9-CM: 780.99  6/21/2017        CKD (chronic kidney disease) stage 4, GFR 15-29 ml/min (HCC) (Chronic) ICD-10-CM: N18.4  ICD-9-CM: 585.4  2/10/2017        DVT (deep venous thrombosis) ([facility] ICD-10-CM: I82.409  ICD-9-CM: 453.40  2/2/2017        DM (diabetes mellitus), type 2 with renal complications (HCC) (Chronic) ICD-10-CM: E11.29  ICD-9-CM: 250.40  8/9/2013        Vitamin D deficiency ICD-10-CM: E55.9  ICD-9-CM: 268.9  8/9/2013        Normocytic anemia (Chronic) ICD-10-CM: D64.9  ICD-9-CM: 285.9  5/26/2013        DJD (degenerative joint disease) of knee ICD-10-CM: M17.10  ICD-9-CM: 715.36  10/30/2012        HTN (hypertension) (Chronic) ICD-10-CM: I10  ICD-9-CM: 401.9  10/1/2012        Morbid obesity (Chronic) ICD-10-CM: E66.01  ICD-9-CM: 278.01  10/1/2012        Esophageal reflux ICD-10-CM: K21.9  ICD-9-CM: 530.81  10/1/2012        Gastroparesis ICD-10-CM: K31.84  ICD-9-CM: 536.3  10/1/2012        Pulmonary HTN (HCC) (Chronic) ICD-10-CM: I27.2  ICD-9-CM: 416.8  3/21/2012        JASEN on CPAP (Chronic) ICD-10-CM: G47.33, Z99.89  ICD-9-CM: 327.23, V46.8  2/16/2011    Overview Signed 3/21/2012  8:04 AM by PRASHANT Cao Dr., Ma CPAP                   Cardiac Testing  CATH: 2004: 1v CAD by cath - PCI OM with SAL  CATH 5/2004 - patent stent, no new disease   Lexiscan cardiolite 12/09- normal perfusion, EF 66%  ECHO 11/2009: LVH, EF 38%, diastolic dysfunction  STRESS/LEXISCAN/CARDIOLITE 3/29/11: normal perfusion, EF 62%  CATH: 3/20/2012 :PA 55/30 mean 41, wedge 26, RA 24, EDP 35, LVG 55%, LM normal, LAD normal, LCX - OM1 patent stent, OM2 prox 85% long, % w/ L -> R collateral; s/p ASL-> OM2/OM3 with SAL. CATH: 10/4/2012: EDP 25, EF 55%, LM nl, LAD mild plaque, LCX patent OM stents, % prox with good L to R collaterals. Cath 3/18/2014 - RA 9 PA 42/19/29, PCW 12, EDP 25, no LVG due to CKD, LM nl, LAD mild plaque, LCX patent stents OM1/OM2, RCA chronic proximal occlusion with L to R collaterals. ECHO 4/11/16: EF 60-65%. No WMA. LA mildly dilated. Lexiscan Cardiolite 4/11/16: Normal. LVEF 55%. Compared to 3/29/11, no significant changes. HPI    Mrs. Marlon Lamas states she has had new onset gout a few weeks ago involving the left ankle, improved with steroids. She will start on Allopurinol once her flare resolves. She continues to have class 3 LARA, wears oxygen continuously but gets episodes of worsening dyspnea and chest pressure with activity. Patient is adherent with CPAP. Patient continues to have R calf pain from residual calf DVT (peroneal) for which she is on Coumadin. Pt sees Dr. Radha Pearson for her lungs. Kidneys are followed by Dr. Delmi Cueto.   Patient denies any palpitations, syncope, orthopnea, or paroxysmal nocturnal dyspnea. No bleeding issues. CT chest March - diffuse ILD with honeycombing, PA HTN suggested, heavy cor Ca2+    Current Outpatient Prescriptions on File Prior to Visit   Medication Sig Dispense Refill    oxyCODONE-acetaminophen (PERCOCET 10)  mg per tablet Take 1 Tab by mouth every four (4) hours as needed for Pain. Max Daily Amount: 6 Tabs. 20 Tab 0    warfarin (COUMADIN) 2 mg tablet 1.5 tabs PO daily except fri and mon take 2mg 30 Tab 5    oxyCODONE-acetaminophen (PERCOCET) 5-325 mg per tablet TAKE 1 TABLET BY MOUTH EVERY 8 HOURS AS NEEDED FOR PAIN, MAX DAILY AMOUNT OF 3 TABLETS 20 Tab 0    albuterol (PROVENTIL VENTOLIN) 2.5 mg /3 mL (0.083 %) nebulizer solution       oxyCODONE-acetaminophen (PERCOCET) 5-325 mg per tablet Take 1 Tab by mouth every twelve (12) hours as needed for Pain. Max Daily Amount: 2 Tabs. 60 Tab 0    insulin aspart protamine/insulin aspart (NOVOLOG MIX 70-30) 100 unit/mL (70-30) injection by SubCUTAneous route. Uses sliding scale      ondansetron (ZOFRAN ODT) 4 mg disintegrating tablet Take 1 Tab by mouth every six (6) hours as needed for Nausea. (Patient taking differently: Take 4 mg by mouth as needed for Nausea.) 30 Tab 0    albuterol-ipratropium (DUONEB) 2.5 mg-0.5 mg/3 ml nebu 3 mL by Nebulization route daily as needed. Takes about 4 days a week      fluticasone (FLONASE) 50 mcg/actuation nasal spray 2 Sprays by Both Nostrils route nightly as needed.  calcitRIOL (ROCALTROL) 0.25 mcg capsule Take 0.25 mcg by mouth two (2) days a week. Patient takes on Monday and Thursday      isosorbide mononitrate ER (IMDUR) 120 mg CR tablet TAKE 1 TABLET BY MOUTH EVERY DAY 30 Tab 5    carvedilol (COREG) 25 mg tablet TAKE 1 TABLET BY MOUTH TWICE A  Tab 3    pantoprazole (PROTONIX) 40 mg tablet Take 1 Tab by mouth daily.  Indications: GASTROESOPHAGEAL REFLUX 90 Tab 1    nitroglycerin (NITROSTAT) 0.3 mg SL tablet 1 Tab by SubLINGual route every five (5) minutes as needed for Chest Pain. 1 Bottle 1    ergocalciferol (VITAMIN D2) 50,000 unit capsule Take 50,000 Units by mouth every Tuesday and Thursday.  aspirin 81 mg tablet Take 81 mg by mouth daily.  atorvastatin (LIPITOR) 80 mg tablet Take 80 mg by mouth every evening.  allopurinol (ZYLOPRIM) 100 mg tablet Take 1 Tab by mouth daily. 30 Tab 5    predniSONE (STERAPRED DS) 10 mg dose pack See administration instruction per 10mg dose pack 21 Tab 0    imipramine (TOFRANIL) 25 mg tablet Take 1 Tab by mouth nightly. For esophageal spasm 30 Tab 5    bumetanide (BUMEX) 1 mg tablet 2 tab PO Qam and 1 tab Qafternoon 90 Tab 3    magnesium oxide (MAG-OX) 400 mg tablet Take 1 Tab by mouth two (2) times a day. 60 Tab 0    polyethylene glycol (MIRALAX) 17 gram packet Take 17 g by mouth daily as needed.  senna (SENNA) 8.6 mg tablet Take 2 Tabs by mouth nightly.  docusate sodium (COLACE) 100 mg capsule Take 100 mg by mouth nightly. No current facility-administered medications on file prior to visit. Allergies   Allergen Reactions    Contrast Dye [Iodine] Anaphylaxis    Levaquin [Levofloxacin] Nausea and Vomiting    Morphine Hives and Itching     L[facility] in [facility]. Review of Systems  Constitutional: Negative for fever, chills, malaise/fatigue and diaphoresis. Respiratory: Negative for cough, hemoptysis, sputum production, and wheezing. Positive for LARA. Cardiovascular: Negative for palpitations, orthopnea, claudication, and PND. Positive for chest pressure. Gastrointestinal: Negative for nausea, vomiting, blood in stool and melena. Genitourinary: Negative for dysuria and flank pain. Musculoskeletal: Positive for R calf pain. Skin: Negative for rash. Neurological: Negative for focal weakness, seizures, loss of consciousness, weakness and headaches. Endo/Heme/Allergies: Does not bruise/bleed easily. Psychiatric/Behavioral: Negative for memory loss.  The patient does not have insomnia. Visit Vitals    /78 (BP 1 Location: Right arm, BP Patient Position: Sitting)    Pulse 74    Resp 16    Ht 5' 10\" (1.778 m)    Wt 336 lb (152.4 kg)    BMI 48.21 kg/m2     Wt Readings from Last 3 Encounters:   08/18/17 336 lb (152.4 kg)   08/15/17 339 lb (153.8 kg)   08/03/17 342 lb (155.1 kg)       Physical Exam  General - well developed well nourished  Neck - JVP normal, thyroid normal  Cardiac - normal S1,S2, no murmurs, rubs or gallops. No clicks  Vascular - carotids without bruits, radials, femorals and pedal pulses equal bilateral  Lungs - clear to auscultation bilaterals, no rales, wheezing or rhonchi  Abd - soft nontender, no HSM, no abd bruits  Extremities - 2-3+ pedal and pretibial edema  Skin - no rash  Neuro - nonfocal  Psych - normal mood and affect    Cardiographics  EKG 2/14 - Normal sinus rhythm, low voltage, otherwise normal EKG  Echo 02/04/17- LVEF 75%     ASSESSMENT and PLAN  Encounter Diagnoses   Name Primary?  Chronic diastolic heart failure (HCC) Yes    Essential hypertension     Type 2 diabetes mellitus with stage 3 chronic kidney disease, unspecified long term insulin use status (HCC)     Pulmonary HTN (HCC)     JASEN on CPAP     COPD, severe (HCC)     Atherosclerosis of native coronary artery of native heart with stable angina pectoris (HCC)     Chronic deep vein thrombosis (DVT) of distal vein of right lower extremity (HCC)     ILD (interstitial lung disease) (HCC)     Warfarin anticoagulation     Interstitial lung disease ([facility]     Morbid obesity due to excess calories [facility])      Mrs. Chadd Tena has chronic diastolic HF in the setting of morbid obesity, HTN, CAD, JASEN and advanced CKD. She also has significant interstitial lung disease. She continues to have class 3-4 LARA on continuous Oxygen. Her volume status is uncertain. Cath 2014 demonstrated stable CAD with RCA . EDP was 25, PAD 20.  Stress nuclear study 1 year ago was normal. Echo February 2017 demonstrated vigorous LV function. It may be reasonable to consider right heart cath to better assess hemodynamics. She may be a good candidate for CardioMEMS.      Right heart cath + CardioMEMS  Right IJ vein with US  Stop coumadin 3 days prior    Written by Maile Wilkerson, as dictated by Teri Phillips MD.   Teri Phillips MD

## 2017-08-20 PROBLEM — J18.9 HCAP (HEALTHCARE-ASSOCIATED PNEUMONIA): Status: RESOLVED | Noted: 2017-02-28 | Resolved: 2017-08-20

## 2017-08-20 PROBLEM — J44.1 ACUTE EXACERBATION OF CHRONIC OBSTRUCTIVE PULMONARY DISEASE (COPD) (HCC): Status: RESOLVED | Noted: 2017-03-01 | Resolved: 2017-08-20

## 2017-08-20 PROBLEM — J96.21 ACUTE AND CHRONIC RESPIRATORY FAILURE WITH HYPOXIA (HCC): Status: RESOLVED | Noted: 2017-02-10 | Resolved: 2017-08-20

## 2017-08-20 PROBLEM — Z79.01 WARFARIN ANTICOAGULATION: Status: ACTIVE | Noted: 2017-08-20

## 2017-08-20 PROBLEM — J84.9 ILD (INTERSTITIAL LUNG DISEASE) (HCC): Status: ACTIVE | Noted: 2017-08-20

## 2017-08-23 ENCOUNTER — DOCUMENTATION ONLY (OUTPATIENT)
Dept: CARDIOLOGY CLINIC | Age: 62
End: 2017-08-23

## 2017-08-23 DIAGNOSIS — I82.5Z1 CHRONIC DEEP VEIN THROMBOSIS (DVT) OF DISTAL VEIN OF RIGHT LOWER EXTREMITY (HCC): ICD-10-CM

## 2017-08-23 DIAGNOSIS — I10 ESSENTIAL HYPERTENSION: Chronic | ICD-10-CM

## 2017-08-23 DIAGNOSIS — I25.118 ATHEROSCLEROSIS OF NATIVE CORONARY ARTERY OF NATIVE HEART WITH STABLE ANGINA PECTORIS (HCC): Chronic | ICD-10-CM

## 2017-08-23 DIAGNOSIS — I20.9 ANGINA, CLASS III (HCC): ICD-10-CM

## 2017-08-23 DIAGNOSIS — I50.32 CHRONIC DIASTOLIC HEART FAILURE (HCC): Primary | Chronic | ICD-10-CM

## 2017-08-23 NOTE — PROGRESS NOTES
Your cardiac cath is scheduled for 8/31/2017at 7:30 am  at [facility]. Please arrive 1 hours early at 6:30 am on the second floor at outpatient registration. Have nothing to eat or drink after midnight. Take all medications as prescribed, except bumex morning of procedure and coumadin  4 days prior to procedure. Please have labs drawn no later than 8/29/2017. You will need to have a  with you.  Please bring an overnight bag with you the day of your procedure in case you are asked to remain at the hospital.

## 2017-08-28 ENCOUNTER — TELEPHONE (OUTPATIENT)
Dept: CARDIOLOGY CLINIC | Age: 62
End: 2017-08-28

## 2017-08-28 NOTE — TELEPHONE ENCOUNTER
Patient states she took her coumadin today. Per MD, okay to proceed with procedure 8/29/17. Patient is to begin holding coumadin tomorrow 8/29/17. She will have labs drawn at her nephrologist office.

## 2017-08-30 DIAGNOSIS — I50.32 CHRONIC DIASTOLIC HEART FAILURE (HCC): Primary | Chronic | ICD-10-CM

## 2017-08-30 RX ORDER — SODIUM CHLORIDE 0.9 % (FLUSH) 0.9 %
5-10 SYRINGE (ML) INJECTION EVERY 8 HOURS
Status: CANCELLED | OUTPATIENT
Start: 2017-08-31

## 2017-08-30 RX ORDER — DIPHENHYDRAMINE HYDROCHLORIDE 50 MG/ML
25 INJECTION, SOLUTION INTRAMUSCULAR; INTRAVENOUS ONCE
Qty: 1 SYRINGE | Refills: 0 | Status: CANCELLED
Start: 2017-08-30 | End: 2017-08-30

## 2017-08-30 RX ORDER — SODIUM CHLORIDE 0.9 % (FLUSH) 0.9 %
5-10 SYRINGE (ML) INJECTION AS NEEDED
Status: CANCELLED | OUTPATIENT
Start: 2017-08-31

## 2017-08-30 NOTE — PROGRESS NOTES
2:12 PM Pt's. Chart reviewed possibly including viewing of labs, test results, imaging results and history and physical for possible cath/angio/EP procedure.

## 2017-08-31 ENCOUNTER — HOSPITAL ENCOUNTER (OUTPATIENT)
Dept: CARDIAC CATH/INVASIVE PROCEDURES | Age: 62
Discharge: HOME | End: 2017-08-31
Attending: SPECIALIST | Admitting: SPECIALIST
Payer: MEDICARE

## 2017-08-31 VITALS
BODY MASS INDEX: 41.95 KG/M2 | HEART RATE: 100 BPM | DIASTOLIC BLOOD PRESSURE: 92 MMHG | OXYGEN SATURATION: 99 % | RESPIRATION RATE: 16 BRPM | HEIGHT: 70 IN | SYSTOLIC BLOOD PRESSURE: 207 MMHG | TEMPERATURE: 97.7 F | WEIGHT: 293 LBS

## 2017-08-31 DIAGNOSIS — I82.4Y9 DEEP VEIN THROMBOSIS (DVT) OF PROXIMAL LOWER EXTREMITY, UNSPECIFIED CHRONICITY, UNSPECIFIED LATERALITY (HCC): ICD-10-CM

## 2017-08-31 DIAGNOSIS — I50.32 CHRONIC DIASTOLIC HEART FAILURE (HCC): Chronic | ICD-10-CM

## 2017-08-31 PROBLEM — Z95.9 S/P LEFT PULMONARY ARTERY PRESSURE SENSOR IMPLANT PLACEMENT: Status: ACTIVE | Noted: 2017-08-31

## 2017-08-31 LAB
COHGB MFR BLD: 1.7 % (ref 1–2)
GLUCOSE BLD STRIP.AUTO-MCNC: 151 MG/DL (ref 65–100)
HGB BLD OXIMETRY-MCNC: 5.7 G/DL (ref 14–17)
INR BLD: 1.4 (ref 0.9–1.2)
METHGB MFR BLD: 1.5 % (ref 0–1.4)
OXYHGB MFR BLD: 61.1 % (ref 94–97)
SAO2 % BLD: 63 % (ref 95–99)
SERVICE CMNT-IMP: ABNORMAL

## 2017-08-31 PROCEDURE — C1769 GUIDE WIRE: HCPCS

## 2017-08-31 PROCEDURE — C1751 CATH, INF, PER/CENT/MIDLINE: HCPCS

## 2017-08-31 PROCEDURE — 74011250636 HC RX REV CODE- 250/636: Performed by: SPECIALIST

## 2017-08-31 PROCEDURE — 74011000250 HC RX REV CODE- 250: Performed by: SPECIALIST

## 2017-08-31 PROCEDURE — 77030002986 HC SUT PROL J&J -A

## 2017-08-31 PROCEDURE — C9741 IMPL PRESSURE SENSOR W/ANGIO: HCPCS

## 2017-08-31 PROCEDURE — 82962 GLUCOSE BLOOD TEST: CPT

## 2017-08-31 PROCEDURE — 74011250637 HC RX REV CODE- 250/637: Performed by: SPECIALIST

## 2017-08-31 PROCEDURE — 74011636320 HC RX REV CODE- 636/320: Performed by: SPECIALIST

## 2017-08-31 PROCEDURE — C2624 WIRELESS PRESSURE SENSOR: HCPCS

## 2017-08-31 PROCEDURE — 77030011229 HC DIL VESL COON COOK -A

## 2017-08-31 PROCEDURE — 99152 MOD SED SAME PHYS/QHP 5/>YRS: CPT

## 2017-08-31 PROCEDURE — 82375 ASSAY CARBOXYHB QUANT: CPT | Performed by: SPECIALIST

## 2017-08-31 PROCEDURE — 85610 PROTHROMBIN TIME: CPT

## 2017-08-31 RX ORDER — SODIUM CHLORIDE 0.9 % (FLUSH) 0.9 %
5-10 SYRINGE (ML) INJECTION EVERY 8 HOURS
Status: DISCONTINUED | OUTPATIENT
Start: 2017-08-31 | End: 2017-08-31 | Stop reason: HOSPADM

## 2017-08-31 RX ORDER — DIPHENHYDRAMINE HYDROCHLORIDE 50 MG/ML
25 INJECTION, SOLUTION INTRAMUSCULAR; INTRAVENOUS ONCE
Status: COMPLETED | OUTPATIENT
Start: 2017-08-31 | End: 2017-08-31

## 2017-08-31 RX ORDER — SODIUM CHLORIDE 0.9 % (FLUSH) 0.9 %
5-10 SYRINGE (ML) INJECTION AS NEEDED
Status: DISCONTINUED | OUTPATIENT
Start: 2017-08-31 | End: 2017-08-31 | Stop reason: HOSPADM

## 2017-08-31 RX ORDER — LIDOCAINE HYDROCHLORIDE 10 MG/ML
1-20 INJECTION INFILTRATION; PERINEURAL
Status: DISCONTINUED | OUTPATIENT
Start: 2017-08-31 | End: 2017-08-31 | Stop reason: HOSPADM

## 2017-08-31 RX ORDER — SODIUM CHLORIDE 9 MG/ML
100 INJECTION, SOLUTION INTRAVENOUS CONTINUOUS
Status: DISPENSED | OUTPATIENT
Start: 2017-08-31 | End: 2017-08-31

## 2017-08-31 RX ORDER — FENTANYL CITRATE 50 UG/ML
25-200 INJECTION, SOLUTION INTRAMUSCULAR; INTRAVENOUS
Status: DISCONTINUED | OUTPATIENT
Start: 2017-08-31 | End: 2017-08-31 | Stop reason: HOSPADM

## 2017-08-31 RX ORDER — DIPHENHYDRAMINE HYDROCHLORIDE 50 MG/ML
25 INJECTION, SOLUTION INTRAMUSCULAR; INTRAVENOUS ONCE
Qty: 1 SYRINGE | Refills: 0 | Status: SHIPPED
Start: 2017-08-31 | End: 2017-08-31

## 2017-08-31 RX ORDER — OXYCODONE AND ACETAMINOPHEN 5; 325 MG/1; MG/1
TABLET ORAL
Qty: 20 TAB | Refills: 0 | Status: SHIPPED | OUTPATIENT
Start: 2017-08-31 | End: 2017-09-19 | Stop reason: SDUPTHER

## 2017-08-31 RX ORDER — HEPARIN SODIUM 200 [USP'U]/100ML
500 INJECTION, SOLUTION INTRAVENOUS
Status: DISCONTINUED | OUTPATIENT
Start: 2017-08-31 | End: 2017-08-31 | Stop reason: HOSPADM

## 2017-08-31 RX ORDER — MIDAZOLAM HYDROCHLORIDE 1 MG/ML
.5-1 INJECTION, SOLUTION INTRAMUSCULAR; INTRAVENOUS
Status: DISCONTINUED | OUTPATIENT
Start: 2017-08-31 | End: 2017-08-31 | Stop reason: HOSPADM

## 2017-08-31 RX ORDER — OXYCODONE AND ACETAMINOPHEN 5; 325 MG/1; MG/1
1 TABLET ORAL
Status: DISCONTINUED | OUTPATIENT
Start: 2017-08-31 | End: 2017-08-31 | Stop reason: HOSPADM

## 2017-08-31 RX ORDER — HYDROCORTISONE SODIUM SUCCINATE 100 MG/2ML
100 INJECTION, POWDER, FOR SOLUTION INTRAMUSCULAR; INTRAVENOUS ONCE
Status: COMPLETED | OUTPATIENT
Start: 2017-08-31 | End: 2017-08-31

## 2017-08-31 RX ADMIN — HEPARIN SODIUM 1000 UNITS: 200 INJECTION, SOLUTION INTRAVENOUS at 08:10

## 2017-08-31 RX ADMIN — HYDROCORTISONE SODIUM SUCCINATE 100 MG: 100 INJECTION, POWDER, FOR SOLUTION INTRAMUSCULAR; INTRAVENOUS at 07:33

## 2017-08-31 RX ADMIN — DIPHENHYDRAMINE HYDROCHLORIDE 25 MG: 50 INJECTION, SOLUTION INTRAMUSCULAR; INTRAVENOUS at 07:33

## 2017-08-31 RX ADMIN — FENTANYL CITRATE 50 MCG: 50 INJECTION, SOLUTION INTRAMUSCULAR; INTRAVENOUS at 08:15

## 2017-08-31 RX ADMIN — FENTANYL CITRATE 25 MCG: 50 INJECTION, SOLUTION INTRAMUSCULAR; INTRAVENOUS at 09:06

## 2017-08-31 RX ADMIN — HEPARIN SODIUM 1000 UNITS: 200 INJECTION, SOLUTION INTRAVENOUS at 08:09

## 2017-08-31 RX ADMIN — FENTANYL CITRATE 25 MCG: 50 INJECTION, SOLUTION INTRAMUSCULAR; INTRAVENOUS at 08:38

## 2017-08-31 RX ADMIN — LIDOCAINE HYDROCHLORIDE 20 ML: 10 INJECTION, SOLUTION INFILTRATION; PERINEURAL at 08:14

## 2017-08-31 RX ADMIN — MIDAZOLAM HYDROCHLORIDE 1 MG: 1 INJECTION, SOLUTION INTRAMUSCULAR; INTRAVENOUS at 09:06

## 2017-08-31 RX ADMIN — MIDAZOLAM HYDROCHLORIDE 2 MG: 1 INJECTION, SOLUTION INTRAMUSCULAR; INTRAVENOUS at 08:15

## 2017-08-31 RX ADMIN — MIDAZOLAM HYDROCHLORIDE 2 MG: 1 INJECTION, SOLUTION INTRAMUSCULAR; INTRAVENOUS at 08:21

## 2017-08-31 RX ADMIN — IOPAMIDOL 7 ML: 755 INJECTION, SOLUTION INTRAVENOUS at 09:07

## 2017-08-31 RX ADMIN — OXYCODONE HYDROCHLORIDE AND ACETAMINOPHEN 1 TABLET: 5; 325 TABLET ORAL at 10:15

## 2017-08-31 NOTE — IP AVS SNAPSHOT
Azael Cesar 
 
 
 [address] [facility] 
[phone] Patient: Jose M Reynoso MRN: [id number] FPW:30/36/1874 You are allergic to the following Allergen Reactions Contrast Dye (Iodine) Anaphylaxis Levaquin (Levofloxacin) Nausea and Vomiting Morphine Hives Itching Recent Documentation Height Weight Breastfeeding? BMI OB Status Smoking Status 1.778 m 151.1 kg No 47.8 kg/m2 Hysterectomy Former Smoker Emergency Contacts Name Discharge Info Relation Home Work Mobile [address] [place] [address] CAREGIVER [3] Spouse [3] [phone] About your hospitalization You were admitted on:  August 31, 2017 You last received care in the:  [facility] PACU You were discharged on:  August 31, 2017 Unit phone number:  [phone] Why you were hospitalized Your primary diagnosis was:  Not on File Providers Seen During Your Hospitalizations Provider Role Specialty Primary office phone Fabiana Walton MD Attending Provider Cardiology [phone] Your Primary Care Physician (PCP) Primary Care Physician Office Phone Office Fax Elvis Appl [phone] Follow-up Information Follow up With Details Comments Contact Info Isaac Khan DO   [address] [address] [phone] Your Appointments Tuesday September 12, 2017  2:00 PM EDT  
ESTABLISHED PATIENT with Isaac Khan DO [facility] ([address]) [address] [address] [address] [address] [phone] Current Discharge Medication List  
  
START taking these medications Dose & Instructions Dispensing Information Comments Morning Noon Evening Bedtime diphenhydrAMINE 50 mg/mL Syrg Commonly known as:  BENADRYL Your last dose was: Your next dose is: Dose:  25 mg  
0.5 mL by IntraVENous route once for 1 dose. Quantity:  1 Syringe Refills:  0  
     
   
   
   
  
 hydrocortisone sodium succinate 100 mg injection Commonly known as:  Solu-CORTEF Your last dose was: Your next dose is:    
   
   
 Dose:  100 mg  
2 mL by IntraVENous route once for 1 dose. 1 hour prior to procedure. Quantity:  1 Vial  
Refills:  0 CONTINUE these medications which have CHANGED Dose & Instructions Dispensing Information Comments Morning Noon Evening Bedtime  
 ondansetron 4 mg disintegrating tablet Commonly known as:  ZOFRAN ODT What changed:  when to take this Your last dose was: Your next dose is:    
   
   
 Dose:  4 mg Take 1 Tab by mouth every six (6) hours as needed for Nausea. Quantity:  30 Tab Refills:  0 CONTINUE these medications which have NOT CHANGED Dose & Instructions Dispensing Information Comments Morning Noon Evening Bedtime  
 albuterol 2.5 mg /3 mL (0.083 %) nebulizer solution Commonly known as:  PROVENTIL VENTOLIN Your last dose was: Your next dose is:    
   
   
  Refills:  0  
     
   
   
   
  
 allopurinol 100 mg tablet Commonly known as:  Chrissy Kumar Your last dose was: Your next dose is:    
   
   
 Dose:  100 mg Take 1 Tab by mouth daily. Quantity:  30 Tab Refills:  5  
     
   
   
   
  
 aspirin 81 mg tablet Your last dose was: Your next dose is:    
   
   
 Dose:  81 mg Take 81 mg by mouth daily. Refills:  0  
     
   
   
   
  
 atorvastatin 80 mg tablet Commonly known as:  LIPITOR Your last dose was: Your next dose is:    
   
   
 Dose:  80 mg Take 80 mg by mouth every evening. Refills:  0  
     
   
   
   
  
 bumetanide 1 mg tablet Commonly known as:  Anisa Greco Your last dose was: Your next dose is: 2 tab PO Qam and 1 tab Qafternoon Quantity:  90 Tab Refills:  3  
     
   
   
   
  
 calcitRIOL 0.25 mcg capsule Commonly known as:  ROCALTROL Your last dose was: Your next dose is:    
   
   
 Dose:  0.25 mcg Take 0.25 mcg by mouth two (2) days a week. Patient takes on Monday and Thursday Refills:  0  
     
   
   
   
  
 carvedilol 25 mg tablet Commonly known as:  Maybelle Pallas Your last dose was: Your next dose is: TAKE 1 TABLET BY MOUTH TWICE A DAY Quantity:  180 Tab Refills:  3  
     
   
   
   
  
 docusate sodium 100 mg capsule Commonly known as:  Grey Guppy Your last dose was: Your next dose is:    
   
   
 Dose:  100 mg Take 100 mg by mouth nightly. Refills:  0 DUONEB 2.5 mg-0.5 mg/3 ml Nebu Generic drug:  albuterol-ipratropium Your last dose was: Your next dose is:    
   
   
 Dose:  3 mL  
3 mL by Nebulization route daily as needed. Takes about 4 days a week Refills:  0  
     
   
   
   
  
 FLONASE 50 mcg/actuation nasal spray Generic drug:  fluticasone Your last dose was: Your next dose is:    
   
   
 Dose:  2 Spray 2 Sprays by Both Nostrils route nightly as needed. Refills:  0  
     
   
   
   
  
 imipramine 25 mg tablet Commonly known as:  TOFRANIL Your last dose was: Your next dose is:    
   
   
 Dose:  25 mg Take 1 Tab by mouth nightly. For esophageal spasm Quantity:  30 Tab Refills:  5  
     
   
   
   
  
 isosorbide mononitrate  mg CR tablet Commonly known as:  IMDUR Your last dose was: Your next dose is: TAKE 1 TABLET BY MOUTH EVERY DAY Quantity:  30 Tab Refills:  5  
     
   
   
   
  
 magnesium oxide 400 mg tablet Commonly known as:  MAG-OX Your last dose was: Your next dose is:    
   
   
 Dose:  400 mg Take 1 Tab by mouth two (2) times a day. Quantity:  60 Tab Refills:  0 MIRALAX 17 gram packet Generic drug:  polyethylene glycol Your last dose was: Your next dose is:    
   
   
 Dose:  17 g Take 17 g by mouth daily as needed. Refills:  0  
     
   
   
   
  
 nitroglycerin 0.3 mg SL tablet Commonly known as:  NITROSTAT Your last dose was: Your next dose is:    
   
   
 Dose:  0.4 mg  
1 Tab by SubLINGual route every five (5) minutes as needed for Chest Pain. Quantity:  1 Bottle Refills:  1 NovoLOG Mix 70-30 100 unit/mL (70-30) injection Generic drug:  insulin aspart protamine/insulin aspart Your last dose was: Your next dose is:    
   
   
 by SubCUTAneous route. Uses sliding scale Refills:  0  
     
   
   
   
  
 * oxyCODONE-acetaminophen 5-325 mg per tablet Commonly known as:  PERCOCET Your last dose was: Your next dose is:    
   
   
 Dose:  1 Tab Take 1 Tab by mouth every twelve (12) hours as needed for Pain. Max Daily Amount: 2 Tabs. Quantity:  60 Tab Refills:  0  
     
   
   
   
  
 * oxyCODONE-acetaminophen 5-325 mg per tablet Commonly known as:  PERCOCET Your last dose was: Your next dose is: TAKE 1 TABLET BY MOUTH EVERY 8 HOURS AS NEEDED FOR PAIN, MAX DAILY AMOUNT OF 3 TABLETS Quantity:  20 Tab Refills:  0  
     
   
   
   
  
 * oxyCODONE-acetaminophen  mg per tablet Commonly known as:  PERCOCET 10 Your last dose was: Your next dose is:    
   
   
 Dose:  1 Tab Take 1 Tab by mouth every four (4) hours as needed for Pain. Max Daily Amount: 6 Tabs. Quantity:  20 Tab Refills:  0  
     
   
   
   
  
 pantoprazole 40 mg tablet Commonly known as:  PROTONIX Your last dose was: Your next dose is:    
   
   
 Dose:  40 mg Take 1 Tab by mouth daily. Indications: GASTROESOPHAGEAL REFLUX Quantity:  90 Tab Refills:  1 predniSONE 10 mg dose pack Commonly known as:  STERAPRED DS Your last dose was: Your next dose is:    
   
   
 See administration instruction per 10mg dose pack Quantity:  21 Tab Refills:  0 Senna 8.6 mg tablet Generic drug:  senna Your last dose was: Your next dose is:    
   
   
 Dose:  2 Tab Take 2 Tabs by mouth nightly. Refills:  0  
     
   
   
   
  
 VITAMIN D2 50,000 unit capsule Generic drug:  ergocalciferol Your last dose was: Your next dose is:    
   
   
 Dose:  12860 Units Take 50,000 Units by mouth every Tuesday and Thursday. Refills:  0  
     
   
   
   
  
 warfarin 2 mg tablet Commonly known as:  COUMADIN Your last dose was: Your next dose is:    
   
   
 1.5 tabs PO daily except fri and mon take 2mg Quantity:  30 Tab Refills:  5  
     
   
   
   
  
 * Notice: This list has 3 medication(s) that are the same as other medications prescribed for you. Read the directions carefully, and ask your doctor or other care provider to review them with you. Where to Get Your Medications Information on where to get these meds will be given to you by the nurse or doctor. ! Ask your nurse or doctor about these medications diphenhydrAMINE 50 mg/mL Syrg  
 hydrocortisone sodium succinate 100 mg injection Discharge Instructions Patient Discharge Instructions Rut Cabral / [id number] : 1955 Admitted 2017 Discharged: 2017 Take Home Medications Resume coumadin from this evening 3mg a day Please come to [address] [place] [address] tomorrow afternoon to have your sutures removed Please have nurses draw blood to check on kidney function, copy of results to Dr Rosy Dhillon Please get INR check done on tuesday · It is important that you take the medication exactly as they are prescribed. · Keep your medication in the bottles provided by the pharmacist and keep a list of the medication names, dosages, and times to be taken in your wallet. · Do not take other medications without consulting your doctor. Follow-up with Neela Sheets MD in 1 week. Office to arrange your appointment. Cardiac Catheterization  Discharge Instructions ? Do not drive, operate any machinery, or sign any legal documents for 24 hours after your procedure. You must have someone to drive you home. ? You may take a shower 24 hours after your cardiac catheterization. Be sure to get the dressing wet and then remove it; gently wash the area with warm soapy water. Pat dry and leave open to air. To help prevent infections, be sure to keep the cath site clean and dry. No lotions, creams, powders, ointments, etc. in the cath site for approximately 1 week. ? Do not take a tub bath, get in a hot tub or swimming pool for approximately 5 days or until the cath site is completely healed. ? No strenuous activity or heavy lifting over 10 lbs. for 7 days. ? Drink plenty of fluids for 24-48 hours after your cath to flush the contrast dye from your kidneys. No alcoholic beverages for 24 hours. You may resume your previous diet (low fat, low cholesterol) after your cath. ? After your cath, some bruising or discomfort is common during the healing process. Tylenol, 1-2 tablets every 6 hours as needed, is recommended if you experience any discomfort. If you experience any signs or symptoms of infection such as fever, chills, or poorly healing incision, persistent tenderness or swelling in the groin, redness and/or warmth to the touch, numbness, significant tingling or pain at the groin site or affected extremity, rash, drainage from the cath site, or if the leg feels tight or swollen, call your physician right away.  
 
? If bleeding at the cath site occurs, take a clean gauze pad and apply direct pressure to the groin just above the puncture site. Call 911 immediately, and continue to apply direct pressure until an ambulance gets to your location. ? You may return to work  2  days after your cardiac cath if no groin bleeding. Information obtained by : 
I understand that if any problems occur once I am at home I am to contact my physician. I understand and acknowledge receipt of the instructions indicated above. R.N.'s Signature                                                                  Date/Time Patient or Representative Signature                                                          Date/Time Rose Burnham MD 
 
Discharge Orders Procedure Order Date Status Priority Quantity Spec Type Associated Dx METABOLIC PANEL, BASIC 22/37/84 0940 Normal Routine 1 Serum Chronic diastolic heart failure ([facility] [address] [[address]] Schedule Instructions:  Obtain tomorrow when you get sutures removed Introducing [facility] [place] [facility] SERVICES! Dear Maria Isabel Mata: Thank you for requesting a [employer] account. Our records indicate that you already have an active [employer] account. You can access your account anytime at [email or web address] Did you know that you can access your hospital and ER discharge instructions at any time in [employer] You can also review all of your test results from your hospital stay or ER visit. Additional Information If you have questions, please visit the Frequently Asked Questions section of the [employer] website at [email or web address] Remember, [employer] is NOT to be used for urgent needs. For medical emergencies, dial 911. Now available from your iPhone and Android! General Information Please provide this summary of care documentation to your next provider. Patient Signature:  ____________________________________________________________ Date:  ____________________________________________________________  
  
Claudene Born Provider Signature:  ____________________________________________________________ Date:  ____________________________________________________________

## 2017-08-31 NOTE — DISCHARGE INSTRUCTIONS
Patient Discharge Instructions    Rhiannon Chu / [id number] : 1955    Admitted 2017 Discharged: 2017     Take Home Medications     Resume coumadin from this evening 3mg a day  Please come to [address] [place] [address] tomorrow afternoon to have your sutures removed  Please have nurses draw blood to check on kidney function, copy of results to Dr Jose M Christensen  Please get INR check done on tuesday     · It is important that you take the medication exactly as they are prescribed. · Keep your medication in the bottles provided by the pharmacist and keep a list of the medication names, dosages, and times to be taken in your wallet. · Do not take other medications without consulting your doctor. Follow-up with Rose Burnham MD in 1 week. Office to arrange your appointment. Cardiac Catheterization  Discharge Instructions     Do not drive, operate any machinery, or sign any legal documents for 24 hours after your procedure. You must have someone to drive you home.  You may take a shower 24 hours after your cardiac catheterization. Be sure to get the dressing wet and then remove it; gently wash the area with warm soapy water. Pat dry and leave open to air. To help prevent infections, be sure to keep the cath site clean and dry. No lotions, creams, powders, ointments, etc. in the cath site for approximately 1 week.  Do not take a tub bath, get in a hot tub or swimming pool for approximately 5 days or until the cath site is completely healed.  No strenuous activity or heavy lifting over 10 lbs. for 7 days.  Drink plenty of fluids for 24-48 hours after your cath to flush the contrast dye from your kidneys. No alcoholic beverages for 24 hours. You may resume your previous diet (low fat, low cholesterol) after your cath.  After your cath, some bruising or discomfort is common during the healing process.   Tylenol, 1-2 tablets every 6 hours as needed, is recommended if you experience any discomfort. If you experience any signs or symptoms of infection such as fever, chills, or poorly healing incision, persistent tenderness or swelling in the groin, redness and/or warmth to the touch, numbness, significant tingling or pain at the groin site or affected extremity, rash, drainage from the cath site, or if the leg feels tight or swollen, call your physician right away.  If bleeding at the cath site occurs, take a clean gauze pad and apply direct pressure to the groin just above the puncture site. Call 911 immediately, and continue to apply direct pressure until an ambulance gets to your location.  You may return to work  2  days after your cardiac cath if no groin bleeding. Information obtained by :  I understand that if any problems occur once I am at home I am to contact my physician. I understand and acknowledge receipt of the instructions indicated above.                                                                                                                                            R.N.'s Signature                                                                  Date/Time                                                                                                                                              Patient or Representative Maria Esther Garber MD

## 2017-08-31 NOTE — PROGRESS NOTES
7:36 AM  Patient arrived. ID and allergies verified verbally with patient. Pt voices understanding of procedure to be performed. Consent obtained. Pt prepped for procedure. Patient and family oriented to fall prevention protocol. Understanding verbalized. Yellow band and socks applied    POC INR  1.4    POC BS  151    7:50 AM  TRANSFER - OUT REPORT:    Verbal report given to Britta(name) on [employer]  being transferred to cath lab(unit) for ordered procedure       Report consisted of patients Situation, Background, Assessment and   Recommendations(SBAR). Information from the following report(s) SBAR and MAR was reviewed with the receiving nurse. Lines:       Opportunity for questions and clarification was provided. Patient transported with:   Registered Nurse    9:10 AM  TRANSFER - IN REPORT:    Verbal report received from Britta(name) on [employer]  being received from cath lab(unit) for routine post - op      Report consisted of patients Situation, Background, Assessment and   Recommendations(SBAR). Information from the following report(s) SBAR, Procedure Summary and MAR was reviewed with the receiving nurse. Opportunity for questions and clarification was provided. Assessment completed upon patients arrival to unit and care assumed. Pt voices understanding of post procedure bedrest instructions. 1015  Percocet given per md order    1030  Head of bed up  Groin site stable    10:38 AM  Cardiac rehab nurses in to see pt with instructions for MEMs monitoring    11:10 AM  Discharge instructions reviewed with patient and family. Voiced understanding. Patient given copy of discharge instructions to take home. 1145  Pt discharged off unit via wheelchair with nurse.    Care of pt turned over to

## 2017-08-31 NOTE — NURSE NAVIGATOR
Met with patient in cath recovery area along with Dimitri Barba HF nurse navigator from [facility].  at bedside. Introduced self and  purpose of visit:  patient education for Cardiomems monitoring at home. Assessed patient current understanding of device. Patient states she understands she lays on a pillow at home to send data to Dr Luly Brown to help manage her fluid status. Patient and  educated on use of her Cardiomems pillow and patient able to lay on pillow and successfully transmit a reading with verbal cues. Patient/ given opportunity to ask questions/concerns and all questions were answered. Patient gave permission to use her mobile number ([phone])  as primary number to  contact her and to use automated text messages available on the [facility]. net site to this mobile device.

## 2017-08-31 NOTE — H&P
Date of Surgery Update:  Bryce Carey was seen and examined. History and physical has been reviewed. The patient has been examined. There have been no significant clinical changes since the completion of the originally dated History and Physical.    Signed By: Fili Myers MD     August 31, 2017 7:34 AM           Proceed with RHC, CardioMEMS implant via right femoral vein  INR 1.4  Cr 3  Hgb 7.6    ASA 3  Airway 3      Progress Notes  Encounter Date: 8/18/2017  Fili Myers MD   Cardiology      []Hide copied text  History of Present Illness     Bryce Carey is a 63 y.o. female. Last seen 4 months ago.      Problem List  Date Reviewed: 8/15/2017             Codes Class Noted     Angina, class III ([facility] [address] ICD-10-CM: I20.9  ICD-9-CM: 413.9   8/9/2013           Chronic diastolic heart failure (HCC) (Chronic) ICD-10-CM: I50.32  ICD-9-CM: 428.32   10/5/2012           Interstitial lung disease ([facility] [address] (Chronic) ICD-10-CM: J84.9  ICD-9-CM: 530   2/28/2011           CAD (coronary Artery Disease) Native Artery (Chronic) ICD-10-CM: I25.10  ICD-9-CM: 414.01   2/16/2011     Overview Addendum 10/5/2012  7:33 AM by PRASHANT Maddox       [place] 2004  1v CAD by cath - PCI OM with SAL  Cath 5/2004 - patent stent, no new disease  Lexiscan cardiolite 12/09- normal perfusion, EF 66%  Cath: 3/19/12: PA 55/30 mean 41, wedge 26, RA 24, EDP 35, LVG 55%, LM normal, LAD normal, LCX - OM1 patent stent, OM2 prox 85% long, % with L to R collaterals                   ILD (interstitial lung disease) ([facility] [address] ICD-10-CM: J84.9  ICD-9-CM: 515   8/20/2017           Warfarin anticoagulation ICD-10-CM: Z79.01  ICD-9-CM: V58.61   8/20/2017           COPD, severe (HCC) ICD-10-CM: J44.9  ICD-9-CM: 496   6/21/2017           Decreased ambulation status ICD-10-CM: R68.89  ICD-9-CM: 780.99   6/21/2017           CKD (chronic kidney disease) stage 4, GFR 15-29 ml/min ([facility]) (Chronic) ICD-10-CM: N18.4  ICD-9-CM: 585. 4   2/10/2017         DVT (deep venous thrombosis) (HCC) ICD-10-CM: I82.409  ICD-9-CM: 453.40   2/2/2017           DM (diabetes mellitus), type 2 with renal complications (HCC) (Chronic) ICD-10-CM: E11.29  ICD-9-CM: 250.40   8/9/2013           Vitamin D deficiency ICD-10-CM: E55.9  ICD-9-CM: 294. 9   8/9/2013           Normocytic anemia (Chronic) ICD-10-CM: D64.9  ICD-9-CM: 285. 9   5/26/2013           DJD (degenerative joint disease) of knee ICD-10-CM: M17.10  ICD-9-CM: 715.36   10/30/2012           HTN (hypertension) (Chronic) ICD-10-CM: I10  ICD-9-CM: 401. 9   10/1/2012           Morbid obesity (Chronic) ICD-10-CM: E66.01  ICD-9-CM: 278.01   10/1/2012           Esophageal reflux ICD-10-CM: K21.9  ICD-9-CM: 530.81   10/1/2012           Gastroparesis ICD-10-CM: K31.84  ICD-9-CM: 536.3   10/1/2012           Pulmonary HTN (HCC) (Chronic) ICD-10-CM: I27.2  ICD-9-CM: 416.8   3/21/2012           JASEN on CPAP (Chronic) ICD-10-CM: G47.33, Z99.89  ICD-9-CM: 327.23, V46.8   2/16/2011     Overview Signed 3/21/2012  8:04 AM by PRASHANT [address]. CPAP                      Cardiac Testing  CATH: 2004: 1v CAD by cath - PCI OM with SAL  CATH 5/2004 - patent stent, no new disease   Lexiscan cardiolite 12/09- normal perfusion, EF 66%  ECHO 11/2009: LVH, EF 69%, diastolic dysfunction  STRESS/LEXISCAN/CARDIOLITE 3/29/11: normal perfusion, EF 62%  CATH: 3/20/2012 :PA 55/30 mean 41, wedge 26, RA 24, EDP 35, LVG 55%, LM normal, LAD normal, LCX - OM1 patent stent, OM2 prox 85% long, % w/ L -> R collateral; s/p SAL-> OM2/OM3 with SAL. CATH: 10/4/2012: EDP 25, EF 55%, LM nl, LAD mild plaque, LCX patent OM stents, % prox with good L to R collaterals. Cath 3/18/2014 - RA 9 PA 42/19/29, PCW 12, EDP 25, no LVG due to CKD, LM nl, LAD mild plaque, LCX patent stents OM1/OM2, RCA chronic proximal occlusion with L to R collaterals. ECHO 4/11/16: EF 60-65%. No WMA. LA mildly dilated. Lexiscan Cardiolite 4/11/16: Normal. LVEF 55%. Compared to 3/29/11, no significant changes.        HPI     Mrs. Madelyn Foster states she has had new onset gout a few weeks ago involving the left ankle, improved with steroids. She will start on Allopurinol once her flare resolves. She continues to have class 3 LARA, wears oxygen continuously but gets episodes of worsening dyspnea and chest pressure with activity. Patient is adherent with CPAP. Patient continues to have R calf pain from residual calf DVT (peroneal) for which she is on Coumadin. Pt sees Dr. Nba Knapp for her lungs. Kidneys are followed by Dr. Tanya Garnica. Patient denies any palpitations, syncope, orthopnea, or paroxysmal nocturnal dyspnea.     No bleeding issues.     CT chest March - diffuse ILD with honeycombing, PA HTN suggested, heavy cor Ca2+            Current Outpatient Prescriptions on File Prior to Visit   Medication Sig Dispense Refill    oxyCODONE-acetaminophen (PERCOCET 10)  mg per tablet Take 1 Tab by mouth every four (4) hours as needed for Pain. Max Daily Amount: 6 Tabs. 20 Tab 0    warfarin (COUMADIN) 2 mg tablet 1.5 tabs PO daily except fri and mon take 2mg 30 Tab 5    oxyCODONE-acetaminophen (PERCOCET) 5-325 mg per tablet TAKE 1 TABLET BY MOUTH EVERY 8 HOURS AS NEEDED FOR PAIN, MAX DAILY AMOUNT OF 3 TABLETS 20 Tab 0    albuterol (PROVENTIL VENTOLIN) 2.5 mg /3 mL (0.083 %) nebulizer solution          oxyCODONE-acetaminophen (PERCOCET) 5-325 mg per tablet Take 1 Tab by mouth every twelve (12) hours as needed for Pain. Max Daily Amount: 2 Tabs. 60 Tab 0    insulin aspart protamine/insulin aspart (NOVOLOG MIX 70-30) 100 unit/mL (70-30) injection by SubCUTAneous route. Uses sliding scale        ondansetron (ZOFRAN ODT) 4 mg disintegrating tablet Take 1 Tab by mouth every six (6) hours as needed for Nausea. (Patient taking differently: Take 4 mg by mouth as needed for Nausea.) 30 Tab 0    albuterol-ipratropium (DUONEB) 2.5 mg-0.5 mg/3 ml nebu 3 mL by Nebulization route daily as needed. Takes about 4 days a week        fluticasone (FLONASE) 50 mcg/actuation nasal spray 2 Sprays by Both Nostrils route nightly as needed.        calcitRIOL (ROCALTROL) 0.25 mcg capsule Take 0.25 mcg by mouth two (2) days a week. Patient takes on Monday and Thursday        isosorbide mononitrate ER (IMDUR) 120 mg CR tablet TAKE 1 TABLET BY MOUTH EVERY DAY 30 Tab 5    carvedilol (COREG) 25 mg tablet TAKE 1 TABLET BY MOUTH TWICE A  Tab 3    pantoprazole (PROTONIX) 40 mg tablet Take 1 Tab by mouth daily. Indications: GASTROESOPHAGEAL REFLUX 90 Tab 1    nitroglycerin (NITROSTAT) 0.3 mg SL tablet 1 Tab by SubLINGual route every five (5) minutes as needed for Chest Pain. 1 Bottle 1    ergocalciferol (VITAMIN D2) 50,000 unit capsule Take 50,000 Units by mouth every Tuesday and Thursday.        aspirin 81 mg tablet Take 81 mg by mouth daily.        atorvastatin (LIPITOR) 80 mg tablet Take 80 mg by mouth every evening.          allopurinol (ZYLOPRIM) 100 mg tablet Take 1 Tab by mouth daily. 30 Tab 5    predniSONE (STERAPRED DS) 10 mg dose pack See administration instruction per 10mg dose pack 21 Tab 0    imipramine (TOFRANIL) 25 mg tablet Take 1 Tab by mouth nightly. For esophageal spasm 30 Tab 5    bumetanide (BUMEX) 1 mg tablet 2 tab PO Qam and 1 tab Qafternoon 90 Tab 3    magnesium oxide (MAG-OX) 400 mg tablet Take 1 Tab by mouth two (2) times a day.  60 Tab 0    polyethylene glycol (MIRALAX) 17 gram packet Take 17 g by mouth daily as needed.        senna (SENNA) 8.6 mg tablet Take 2 Tabs by mouth nightly.        docusate sodium (COLACE) 100 mg capsule Take 100 mg by mouth nightly.          No current facility-administered medications on file prior to visit.               Allergies   Allergen Reactions    Contrast Dye [Iodine] Anaphylaxis    Levaquin [Levofloxacin] Nausea and Vomiting    Morphine Hives and Itching      Lives in [place].      Review of Systems  Constitutional: Negative for fever, chills, malaise/fatigue and diaphoresis. Respiratory: Negative for cough, hemoptysis, sputum production, and wheezing. Positive for LARA. Cardiovascular: Negative for palpitations, orthopnea, claudication, and PND. Positive for chest pressure. Gastrointestinal: Negative for nausea, vomiting, blood in stool and melena. Genitourinary: Negative for dysuria and flank pain. Musculoskeletal: Positive for R calf pain. Skin: Negative for rash. Neurological: Negative for focal weakness, seizures, loss of consciousness, weakness and headaches. Endo/Heme/Allergies: Does not bruise/bleed easily. Psychiatric/Behavioral: Negative for memory loss. The patient does not have insomnia.             Visit Vitals    /78 (BP 1 Location: Right arm, BP Patient Position: Sitting)    Pulse 74    Resp 16    Ht 5' 10\" (1.778 m)    Wt 336 lb (152.4 kg)    BMI 48.21 kg/m2          Wt Readings from Last 3 Encounters:   08/18/17 336 lb (152.4 kg)   08/15/17 339 lb (153.8 kg)   08/03/17 342 lb (155.1 kg)         Physical Exam  General - well developed well nourished  Neck - JVP normal, thyroid normal  Cardiac - normal S1,S2, no murmurs, rubs or gallops. No clicks  Vascular - carotids without bruits, radials, femorals and pedal pulses equal bilateral  Lungs - clear to auscultation bilaterals, no rales, wheezing or rhonchi  Abd - soft nontender, no HSM, no abd bruits  Extremities - 2-3+ pedal and pretibial edema  Skin - no rash  Neuro - nonfocal  Psych - normal mood and affect     Cardiographics  EKG 2/14 - Normal sinus rhythm, low voltage, otherwise normal EKG  Echo 02/04/17- LVEF 75%      ASSESSMENT and PLAN       Encounter Diagnoses   Name Primary?     Chronic diastolic heart failure (HCC) Yes    Essential hypertension      Type 2 diabetes mellitus with stage 3 chronic kidney disease, unspecified long term insulin use status (HCC)      Pulmonary HTN (HCC)      JASEN on CPAP      COPD, severe (HCC)      Atherosclerosis of native coronary artery of native heart with stable angina pectoris (HCC)      Chronic deep vein thrombosis (DVT) of distal vein of right lower extremity (HCC)      ILD (interstitial lung disease) (HCC)      Warfarin anticoagulation      Interstitial lung disease ([address]      Morbid obesity due to excess calories [facility])        Mrs. Chadd Tena has chronic diastolic HF in the setting of morbid obesity, HTN, CAD, JASEN and advanced CKD. She also has significant interstitial lung disease. She continues to have class 3-4 LARA on continuous Oxygen. Her volume status is uncertain. Cath 2014 demonstrated stable CAD with RCA . EDP was 25, PAD 20. Stress nuclear study 1 year ago was normal. Echo February 2017 demonstrated vigorous LV function.      It may be reasonable to consider right heart cath to better assess hemodynamics.  She may be a good candidate for CardioMEMS.      Right heart cath + CardioMEMS  Right IJ vein with US  Stop coumadin 3 days prior     Written by Roberta Maddox, as dictated by Samuel Cannon MD.   Samuel Cannon MD         Electronically signed by Samuel Cannon MD at 08/20/17 7306        Office Visit on 8/18/2017              Revision History              Detailed Report             Note shared with patient   Note Details   Author Samuel Cannon MD File Time 08/20/17 3998   Author Type Physician Status Signed   Last  Samuel Cannon MD Specialty Cardiology

## 2017-08-31 NOTE — PROCEDURES
Cardiac Catheterization    Date of Procedure: 8/31/2017   Preoperative Diagnosis: chronic HF  Postoperative Diagnosis: see below    Procedure: Right heart cath, CardioMEMS implantation, ultrasound guidance, right femoral vein closure with purse string suture  Cardiologist: Emilee Sierra MD  Anesthesia: local + IV sedation  Estimated Blood Loss: Minimal  Specimens removed: None  Findings: RA 12, PA 56/20/30, CI (Mayte) 2.65  See full cath note. Complications: none    Moderate-severe PA HTN  Elevated biV filling pressures  Successful CardioMEMS implantation left PA  Successful calibration to [facility]. net      Return tomorrow for suture removal  RTC one week

## 2017-09-01 ENCOUNTER — HOSPITAL ENCOUNTER (OUTPATIENT)
Dept: INTERVENTIONAL RADIOLOGY/VASCULAR | Age: 62
Discharge: HOME | End: 2017-09-01
Attending: SPECIALIST | Admitting: SPECIALIST
Payer: MEDICARE

## 2017-09-01 ENCOUNTER — PATIENT OUTREACH (OUTPATIENT)
Dept: FAMILY MEDICINE CLINIC | Age: 62
End: 2017-09-01

## 2017-09-01 DIAGNOSIS — Z09 FOLLOW UP: ICD-10-CM

## 2017-09-01 LAB
ANION GAP SERPL CALC-SCNC: 11 MMOL/L (ref 5–15)
BUN SERPL-MCNC: 39 MG/DL (ref 6–20)
BUN/CREAT SERPL: 12 (ref 12–20)
CALCIUM SERPL-MCNC: 8.2 MG/DL (ref 8.5–10.1)
CHLORIDE SERPL-SCNC: 107 MMOL/L (ref 97–108)
CO2 SERPL-SCNC: 26 MMOL/L (ref 21–32)
CREAT SERPL-MCNC: 3.36 MG/DL (ref 0.55–1.02)
GLUCOSE SERPL-MCNC: 163 MG/DL (ref 65–100)
POTASSIUM SERPL-SCNC: 4 MMOL/L (ref 3.5–5.1)
SODIUM SERPL-SCNC: 144 MMOL/L (ref 136–145)

## 2017-09-01 PROCEDURE — 99211 OFF/OP EST MAY X REQ PHY/QHP: CPT

## 2017-09-01 PROCEDURE — 80048 BASIC METABOLIC PNL TOTAL CA: CPT | Performed by: SPECIALIST

## 2017-09-01 PROCEDURE — 36415 COLL VENOUS BLD VENIPUNCTURE: CPT | Performed by: SPECIALIST

## 2017-09-01 NOTE — PROGRESS NOTES
9/1/17, This writer/NN received message with alert of hospitalization, per chart review, patient seen at [facility]/Cath Lab, 8/31/17, Right heart cath + CardioMEMS  Per Dr Corinthia Goodell:  Mrs. Bunny Cantu has chronic diastolic HF in the setting of morbid obesity, HTN, CAD, JASEN and advanced CKD. She also has significant interstitial lung disease. She continues to have class 3-4 LARA on continuous Oxygen. Her volume status is uncertain. Cath 2014 demonstrated stable CAD with RCA . EDP was 25, PAD 20. Stress nuclear study 1 year ago was normal. Echo February 2017 demonstrated vigorous LV function.     It may be reasonable to consider right heart cath to better assess hemodynamics. She may be a good candidate for CardioMEMS.     Per documentation, [facility]/Inpatient NN/ Zeferino Mcgovern met with patient and provided patient education for Cardiomems monitoring at home. Assessed patient current understanding of device. Patient states she understands she lays on a pillow at home to send data to Dr Corinthia Goodell to help manage her fluid status. Per Dr Corinthia Goodell:  Resume coumadin from this evening 3mg a day  Please come to [address] [place] [address] tomorrow afternoon to have your sutures removed  Please have nurses draw blood to check on kidney function, copy of results to Dr Trina Montoya  Please get INR check done on Tuesday. Recommended to F/U with Cardiology in 1 week and has F/U appt with Dr Yesi Webster, 9/12/17.  9/1/17, Chart Review, patient continues to keep F/U appts with Dr Yesi Webster, seen recently, 8/15/17 and Cardiology/Dr Corinthia Goodell, 8/18/17.  9/1/17, This writer/NN attempted to contact patient at listed phone number, left voicemail message with I[facility]/NN contact information and request for return call. PLAN: Should patient return call or be seen for F/U appt, discuss purpose of call, recent CardioMEMS/procedure and importance of F/U appts. Discuss symptoms or concerns, medications(compliance and reconciliation/? Coumadin/? INR), ?daily weights, ?restrictions/progress, and F/U appts. Provide instruction, goal work and SinglePlatform as indicated. See Previous NN/Patient Outreach Documentation:  6/7/17, Chart Review. No further ED/Hospitalizations noted at this time. Patient continues to keep regular F/U appts with Dr Lizbeth Donahue. Patient seen today, 6/7/17. Dr Lizbeth Donahue agrees to notify this writer/NN of further NN needs/need for further assistance. See Previous NN/Patient Outreach Documentation:  4/14/17, This writer/NN and [facility] access, able to confirm,patient hospitalized, 4/2/17 - 4/11/17 at [facility] related to Chest Pain and known Interstitial Lung Disease. Printed Discharge Summary for Dr Lizbeth Donahue to review. Per Discharge Summary:  Cardiology Work-up, Serial Cardiac Enzymes, ECHO(EF 34-99%, Grade I Diastolic Dysfunction), and negative stress test, cleared for discharge. PULM, continue outpatient F/U, ICS(Dulera 2 puffs, twice daily) and home medications. 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 H/O DVT/Anticoagulation, Discharged on Coumadin, INR at discharge 2.3  Urology, CT complex renal cyst and renal ultrasound with bilateral complex cyst.  Patient also receiving Ergocalciferol(Tuesday and Thursday). Patient instructed to F/U with PCP for INR, CBC and renal profile. Also to have PULM and Renal F/U appts. Patient instructed to follow 1.5 L/day fluid restriction. Patient S/P 2/28/17 - 3/10/17 at [facility] related to Pneumonia/COPD Exacerbation. Patient kept F/U appt with Dr Lizbeth Donahue, 3/15/17 and Dr Ronel Robbins, 3/22/17. Also kept scheduled appt with Dr Lizbeth Donahue, 3/29/17 at 2:30pm  4/4/17, Per chart review/this writer/NN and [facility] access, patient has required further hospitalization, admitted, 4/2/17 to [facility] related to chest pain and severe Hypokalemia(K+ at 2.9). Aso of note, chronic kidney disease(BUN/Cr at 80/4.7), nearing dialysis, Renal Consult.   See Previous NN/Patient Outreach Documentation:   S/P [facility], 2/16/17 - 2/20/17 related to CHF Exacerbation. Patient kept F/U appt with Dr Tran Monk, 2/23/17, next scheduled appt, 3/9/17, to see PULM, 2/28/17. Patient kept PULM F/U appt, 2/28/17 and is being sent back to ED/further medical evaluation. S/P [facility], 2/2/17 - 2/10/17 related to Resp. Failure/Interstitial Lung Disease, DVT, Diastolic HF, PULM HTN and CKD. 2/16/17, Patient in office to establish PCP with Dr Tran Monk, reports recent [facility] hospitalization and ED visit. Dr Tran Monk contacted this writer/NN with concerns of 30 pound weight gain since discharge, INR at 5. 4(recent DVT on coumadin), Diabetes, CHF,and CKD, recommends patient return to [facility] for further medical evaluation

## 2017-09-01 NOTE — PROGRESS NOTES
Patient is here for sutures to be removed from the femoral groin, post Cardio Mems yesterday. Blood work was taken for a Creatine check per Dr Myah Hodge. The site looks good post suture removal. Band-Aid was applied and patient was well.

## 2017-09-01 NOTE — PROGRESS NOTES
Khang placed preparing to draw labs Harvey Prime aware pt is here for removal of suture,   Labs drawn and sent Harvey Prime here to pull suture

## 2017-09-05 ENCOUNTER — OFFICE VISIT (OUTPATIENT)
Dept: FAMILY MEDICINE CLINIC | Age: 62
End: 2017-09-05

## 2017-09-05 ENCOUNTER — PATIENT OUTREACH (OUTPATIENT)
Dept: FAMILY MEDICINE CLINIC | Age: 62
End: 2017-09-05

## 2017-09-05 VITALS
OXYGEN SATURATION: 100 % | RESPIRATION RATE: 20 BRPM | WEIGHT: 293 LBS | BODY MASS INDEX: 41.95 KG/M2 | SYSTOLIC BLOOD PRESSURE: 144 MMHG | TEMPERATURE: 98.2 F | HEIGHT: 70 IN | DIASTOLIC BLOOD PRESSURE: 72 MMHG | HEART RATE: 77 BPM

## 2017-09-05 DIAGNOSIS — I82.5Z1 CHRONIC DEEP VEIN THROMBOSIS (DVT) OF DISTAL VEIN OF RIGHT LOWER EXTREMITY (HCC): Primary | ICD-10-CM

## 2017-09-05 DIAGNOSIS — F41.9 ANXIETY: ICD-10-CM

## 2017-09-05 LAB
INR BLD: 1.3
PT POC: 15.5 SECONDS
VALID INTERNAL CONTROL?: YES

## 2017-09-05 RX ORDER — HYDROXYZINE PAMOATE 25 MG/1
25 CAPSULE ORAL
Qty: 30 CAP | Refills: 1 | Status: SHIPPED | OUTPATIENT
Start: 2017-09-05 | End: 2018-05-30

## 2017-09-05 NOTE — PROGRESS NOTES
Triny Mirza is a 63 y.o. female   Chief Complaint   Patient presents with    Coagulation disorder    pt here for INR check and just restarted counadin last Thursday night after her procedure. Pt will double her dose of coumadin today the resume normal.    Pt would like to restart xanax but advised would not co-prescribe, pt would prefer to stay with her pain medication for her arthritis. she is a 63y.o. year old female who presents for evalution. Reviewed PmHx, RxHx, FmHx, SocHx, AllgHx and updated and dated in the chart. Review of Systems - negative except as listed above in the HPI    Objective:     Vitals:    09/05/17 1319   BP: 144/72   Pulse: 77   Resp: 20   Temp: 98.2 °F (36.8 °C)   TempSrc: Oral   SpO2: 100%   Weight: 335 lb (152 kg)   Height: 5' 10\" (1.778 m)       Current Outpatient Prescriptions   Medication Sig    hydrOXYzine pamoate (VISTARIL) 25 mg capsule Take 1 Cap by mouth three (3) times daily as needed for Anxiety.  allopurinol (ZYLOPRIM) 100 mg tablet Take 1 Tab by mouth daily.  imipramine (TOFRANIL) 25 mg tablet Take 1 Tab by mouth nightly. For esophageal spasm    warfarin (COUMADIN) 2 mg tablet 1.5 tabs PO daily except fri and mon take 2mg    albuterol (PROVENTIL VENTOLIN) 2.5 mg /3 mL (0.083 %) nebulizer solution     oxyCODONE-acetaminophen (PERCOCET) 5-325 mg per tablet Take 1 Tab by mouth every twelve (12) hours as needed for Pain. Max Daily Amount: 2 Tabs.  bumetanide (BUMEX) 1 mg tablet 2 tab PO Qam and 1 tab Qafternoon    magnesium oxide (MAG-OX) 400 mg tablet Take 1 Tab by mouth two (2) times a day.  polyethylene glycol (MIRALAX) 17 gram packet Take 17 g by mouth daily as needed.  senna (SENNA) 8.6 mg tablet Take 2 Tabs by mouth nightly.  insulin aspart protamine/insulin aspart (NOVOLOG MIX 70-30) 100 unit/mL (70-30) injection by SubCUTAneous route.  Uses sliding scale    albuterol-ipratropium (DUONEB) 2.5 mg-0.5 mg/3 ml nebu 3 mL by Nebulization route daily as needed. Takes about 4 days a week    isosorbide mononitrate ER (IMDUR) 120 mg CR tablet TAKE 1 TABLET BY MOUTH EVERY DAY    carvedilol (COREG) 25 mg tablet TAKE 1 TABLET BY MOUTH TWICE A DAY    pantoprazole (PROTONIX) 40 mg tablet Take 1 Tab by mouth daily. Indications: GASTROESOPHAGEAL REFLUX    oxyCODONE-acetaminophen (PERCOCET) 5-325 mg per tablet TAKE 1 TABLET BY MOUTH EVERY 6 HOURS AS NEEDED FOR PAIN, MAX DAILY AMOUNT OF 4 TABLETS    oxyCODONE-acetaminophen (PERCOCET 10)  mg per tablet Take 1 Tab by mouth every four (4) hours as needed for Pain. Max Daily Amount: 6 Tabs.  docusate sodium (COLACE) 100 mg capsule Take 100 mg by mouth nightly.  ondansetron (ZOFRAN ODT) 4 mg disintegrating tablet Take 1 Tab by mouth every six (6) hours as needed for Nausea. (Patient taking differently: Take 4 mg by mouth as needed for Nausea.)    fluticasone (FLONASE) 50 mcg/actuation nasal spray 2 Sprays by Both Nostrils route nightly as needed.  calcitRIOL (ROCALTROL) 0.25 mcg capsule Take 0.25 mcg by mouth two (2) days a week. Patient takes on Monday and Thursday    nitroglycerin (NITROSTAT) 0.3 mg SL tablet 1 Tab by SubLINGual route every five (5) minutes as needed for Chest Pain.  ergocalciferol (VITAMIN D2) 50,000 unit capsule Take 50,000 Units by mouth every Tuesday and Thursday.  aspirin 81 mg tablet Take 81 mg by mouth daily.  atorvastatin (LIPITOR) 80 mg tablet Take 80 mg by mouth every evening. No current facility-administered medications for this visit.         Physical Examination: General appearance - alert, well appearing, and in no distress  Mental status - alert, oriented to person, place, and time  Eyes - pupils equal and reactive, extraocular eye movements intact  Chest - clear to auscultation, no wheezes, rales or rhonchi, symmetric air entry  Heart - normal rate, regular rhythm, normal S1, S2, no murmurs, rubs, clicks or gallops      Assessment/ Plan:   Diagnoses and all orders for this visit:    1. Chronic deep vein thrombosis (DVT) of distal vein of right lower extremity (HCC)  -     AMB POC PT/INR  Double coumadin dose today then resume normal schedule recheck 1 week  2. Anxiety  -     hydrOXYzine pamoate (VISTARIL) 25 mg capsule; Take 1 Cap by mouth three (3) times daily as needed for Anxiety. Follow-up Disposition:  Return in about 1 week (around 9/12/2017), or if symptoms worsen or fail to improve. I have discussed the diagnosis with the patient and the intended plan as seen in the above orders. The patient has received an after-visit summary and questions were answered concerning future plans. Pt conveyed understanding of plan.     Medication Side Effects and Warnings were discussed with patient      Crystal Rai, DO

## 2017-09-05 NOTE — Clinical Note
Fletcher Gates, I do not see specific office F/U appt with Dr Constance Vasquez procedure)---Patient kept F/U appt today, 9/5/17 with Dr Jodee Thakur at 1.3 per Dr Rupesh Sharma, Double coumadin dose today then resume normal schedule recheck 1 week) and has scheduled F/U appt for 1 week, 9/12/17. PLAN: Should patient return call or be seen for F/U appt, discuss purpose of call, recent CardioMEMS/procedure and importance of F/U appts. Discuss symptoms or concerns, medications/?Coumadin/?INR, ?daily weights, ?restrictions/progress, and F/U appts. Provide instruction, goal work and [employer] as indicated. See Previous NN/Patient Outreach Documentation: 9/1/17, This writer/NN received message with alert of hospitalization, per chart review, patient seen at [facility]/Cath Lab, 8/31/17, Right heart cath + CardioMEMS Per Dr Marj Waite: Mrs. Antonio Meyer has chronic diastolic HF in the setting of morbid obesity, HTN, CAD, JASEN and advanced CKD. She also has significant interstitial lung disease.  Thanks, Rupesh Leonard

## 2017-09-05 NOTE — PROGRESS NOTES
9/5/17, Chart Review. Patient kept F/U appt today, 9/5/17 with Dr Irma Kent at 1.3 per Dr Belen Burrell, Double coumadin dose today then resume normal schedule recheck 1 week) and has scheduled F/U appt for 1 week, 9/12/17. PLAN: Should patient return call or be seen for F/U appt, discuss purpose of call, recent CardioMEMS/procedure and importance of F/U appts. Discuss symptoms or concerns, medications/?Coumadin/?INR, ?daily weights, ?restrictions/progress, and F/U appts. Provide instruction, goal work and [employer] as indicated. See Previous NN/Patient Outreach Documentation:  9/1/17, This writer/NN received message with alert of hospitalization, per chart review, patient seen at [facility]/Cath Lab, 8/31/17, Right heart cath + CardioMEMS  Per Dr Maria Luz Phillips:  Mrs. Sha Alcantara has chronic diastolic HF in the setting of morbid obesity, HTN, CAD, JASEN and advanced CKD. She also has significant interstitial lung disease. She continues to have class 3-4 LARA on continuous Oxygen. Her volume status is uncertain. Cath 2014 demonstrated stable CAD with RCA . EDP was 25, PAD 20. Stress nuclear study 1 year ago was normal. Echo February 2017 demonstrated vigorous LV function.     It may be reasonable to consider right heart cath to better assess hemodynamics. She may be a good candidate for CardioMEMS.     Per documentation, [facility]/Inpatient NN/ Vaibhav Cannon met with patient and provided patient education for Cardiomems monitoring at home. Lloyd Share patient current understanding of device.  Patient states she understands she lays on a pillow at home to send data to Dr Maria Luz Phillips to help manage her fluid status. Per Dr Maria Luz Phillips:  Resume coumadin from this evening 3mg a day  Please come to [facility] tomorrow afternoon to have your sutures removed  Please have nurses draw blood to check on kidney function, copy of results to Dr Meri Kramer  Please get INR check done on Tuesday.   Recommended to F/U with Cardiology in 1 week and has F/U appt with  [facility], 9/12/17.  9/1/17, Chart Review, patient continues to keep F/U appts with Dr Garfield Pathak, seen recently, 8/15/17 and Cardiology/Dr Manjit Pizarro, 8/18/17.  9/1/17, This writer/NN attempted to contact patient at listed phone number, left voicemail message with [facility]/NN contact information and request for return call. See Previous NN/Patient Outreach Documentation:  6/7/17, Chart Review. No further ED/Hospitalizations noted at this time. Patient continues to keep regular F/U appts with Dr Garfield Pathak. Patient seen today, 6/7/17. Dr Garfield Pathak agrees to notify this writer/NN of further NN needs/need for further assistance. See Previous NN/Patient Outreach Documentation:  4/14/17, This writer/NN and [facility] access, able to confirm,patient hospitalized, 4/2/17 - 4/11/17 at [facility] related to Chest Pain and known Interstitial Lung Disease. Printed Discharge Summary for Dr Garfield Pathak to review. Per Discharge Summary:  Cardiology Work-up, Serial Cardiac Enzymes, ECHO(EF 95-25%, Grade I Diastolic Dysfunction), and negative stress test, cleared for discharge. PULM, continue outpatient F/U, ICS(Dulera 2 puffs, twice daily) and home medications. 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 H/O DVT/Anticoagulation, Discharged on Coumadin, INR at discharge 2.3  Urology, CT complex renal cyst and renal ultrasound with bilateral complex cyst.  Patient also receiving Ergocalciferol(Tuesday and Thursday). Patient instructed to F/U with PCP for INR, CBC and renal profile. Also to have PULM and Renal F/U appts. Patient instructed to follow 1.5 L/day fluid restriction. Patient S/P 2/28/17 - 3/10/17 at [facility] related to Pneumonia/COPD Exacerbation. Patient kept F/U appt with Dr Garfield Pathak, 3/15/17 and Dr Te Chen, 3/22/17.  Also kept scheduled appt with Dr Garfield Pathak, 3/29/17 at 2:30pm  4/4/17, Per chart review/this writer/NN and [facility] access, patient has required further hospitalization, admitted, 4/2/17 to [facility] related to chest pain and severe Hypokalemia(K+ at 2.9). Aso of note, chronic kidney disease(BUN/Cr at 80/4.7), nearing dialysis, Renal Consult. See Previous NN/Patient Outreach Documentation:   S/P [facility], 2/16/17 - 2/20/17 related to CHF Exacerbation. Patient kept F/U appt with Dr Kaitlin Llamas, 2/23/17, next scheduled appt, 3/9/17, to see PULM, 2/28/17. Patient kept PULM F/U appt, 2/28/17 and is being sent back to ED/further medical evaluation. S/P [facility], 2/2/17 - 2/10/17 related to Resp. Failure/Interstitial Lung Disease, DVT, Diastolic HF, PULM HTN and CKD. 2/16/17, Patient in office to establish PCP with Dr Kaitlin Llamas, reports recent [facility] hospitalization and ED visit. Dr Kaitlin Llamas contacted this writer/NN with concerns of 30 pound weight gain since discharge, INR at 5. 4(recent DVT on coumadin), Diabetes, CHF,and CKD, recommends patient return to [facility] for further medical evaluation

## 2017-09-05 NOTE — PROGRESS NOTES
Pt here for PT INR check, restarted Thursday after heart procedure   Would like to discuss restarting xanax

## 2017-09-07 ENCOUNTER — TELEPHONE (OUTPATIENT)
Dept: CASE MANAGEMENT | Age: 62
End: 2017-09-07

## 2017-09-07 NOTE — TELEPHONE ENCOUNTER
HF NN spoke with patient via telephone. CardioMEMS indicated rise in pulmonary artery diastolic pressure (PAd 27), up from 18 yesterday. INquired about dietary indiscretions and/or increased symptoms. Patient initially stated \"I've done nothing different\". When specifically asked about swelling/SOB, patient reports \"my hands are a little swollen\" and further states \"I'm always short of breath but it might be a little worse\". HF NN instructed patient to take another reading for clarification. HF NN will await additional reading and notify MD if PAd is still elevated. Additional reading shows PAd 20, which is within threshold. Notified Dr. Author Peters and Keely Linda NP of patient's symptoms and CardioMEMS readings.

## 2017-09-08 ENCOUNTER — TELEPHONE (OUTPATIENT)
Dept: CARDIOLOGY CLINIC | Age: 62
End: 2017-09-08

## 2017-09-08 ENCOUNTER — TELEPHONE (OUTPATIENT)
Dept: CASE MANAGEMENT | Age: 62
End: 2017-09-08

## 2017-09-08 ENCOUNTER — DOCUMENTATION ONLY (OUTPATIENT)
Dept: FAMILY MEDICINE CLINIC | Age: 62
End: 2017-09-08

## 2017-09-08 NOTE — TELEPHONE ENCOUNTER
Patient states that she is calling in regards to a call that was received in regards to the reading from the implantable monitor. Requests a call back at [phone]. Thanks!

## 2017-09-08 NOTE — TELEPHONE ENCOUNTER
TC to patient and identified with 2 identifiers. Cardiomems reading today with PAD elevated to 25. Patient states she is noting more shortness of breath which feels to her like extra fluid  as well as some anxiety. She states her next appointment with Dr. Willi Frias is scheduled for next Friday, 9/15. Assurance given to patient that I will notify Dr Willi Frias of current readings and she will be contacted with further instructions. Treatment team (Dr Pily Swartz NP, HF Nurse Navigators)   notified by [facility] Text and Soila Barba NP responded they will reach out to patient.

## 2017-09-08 NOTE — TELEPHONE ENCOUNTER
Cardio Mems data reveals upward trend in PAD:  9/7/17 - 27 with repeat of 20  9/8/17 - 25    Ms. Neeru Montilla reports worsening shortness of breath. She is currently taking Bumex 2 mg BID. I have advised her to increase Bumex to 2 mg TID, starting with mid day dose now. She will continue this regimen today and tomorrow. I have asked her to reduce dose to BID dosing on Sunday IF her shortness of breath improves. Will phone follow up on Monday to review PAD readings and symptoms. She was reminded to call the on call provider at [facility] over the weekend should she have any questions or concerns.

## 2017-09-11 ENCOUNTER — TELEPHONE (OUTPATIENT)
Dept: CARDIOLOGY CLINIC | Age: 62
End: 2017-09-11

## 2017-09-11 ENCOUNTER — TELEPHONE (OUTPATIENT)
Dept: FAMILY MEDICINE CLINIC | Age: 62
End: 2017-09-11

## 2017-09-11 NOTE — TELEPHONE ENCOUNTER
Reviewed CardioMems PA pressures through the weekend:  Saturday 25 Sunday 21  Today 21  Baseline at implant 18    She states that she took Bumex 2 mg TID on Friday, Saturday and Sunday. Her shortness of breath has mildly improved. She feels less \"congested\". Not dyspneic at rest now, but \"not back to my baseline\". Reviewed trend with Dr. Ashly Hernandez. Plan to have her take Bumex 2mg now with Metolazone 5 mg. Then take Bumex 2 mg again tonight. Starting tomorrow morning, she was advised to take Bumex 4 mg along with another dose of Metolazone 5 mg. She will then obtain her measurement and I will plan to phone follow up with her again after that data is reviewed.

## 2017-09-11 NOTE — TELEPHONE ENCOUNTER
LVM for pt to [facility]. Pt will need to have a face to face evaluation for her scooter, this can be done at her appt tomorrow. Also pt has 2 appt's scheduled for tomorrow which does she want? The 1:15 or the 2:00? Form for face to face placed in Judi's folder.

## 2017-09-12 ENCOUNTER — TELEPHONE (OUTPATIENT)
Dept: CARDIOLOGY CLINIC | Age: 62
End: 2017-09-12

## 2017-09-12 ENCOUNTER — OFFICE VISIT (OUTPATIENT)
Dept: FAMILY MEDICINE CLINIC | Age: 62
End: 2017-09-12

## 2017-09-12 VITALS
DIASTOLIC BLOOD PRESSURE: 52 MMHG | TEMPERATURE: 98.3 F | HEIGHT: 70 IN | RESPIRATION RATE: 18 BRPM | WEIGHT: 293 LBS | SYSTOLIC BLOOD PRESSURE: 134 MMHG | BODY MASS INDEX: 41.95 KG/M2

## 2017-09-12 DIAGNOSIS — Z51.81 MEDICATION MONITORING ENCOUNTER: ICD-10-CM

## 2017-09-12 DIAGNOSIS — J30.9 ALLERGIC RHINITIS, UNSPECIFIED ALLERGIC RHINITIS TRIGGER, UNSPECIFIED RHINITIS SEASONALITY: Primary | ICD-10-CM

## 2017-09-12 LAB
INR BLD: 2.1
PT POC: 25.1 SECONDS
VALID INTERNAL CONTROL?: YES

## 2017-09-12 RX ORDER — CETIRIZINE HYDROCHLORIDE 5 MG/1
5 TABLET ORAL DAILY
Qty: 30 TAB | Refills: 11 | Status: SHIPPED | OUTPATIENT
Start: 2017-09-12 | End: 2017-11-21

## 2017-09-12 NOTE — PATIENT INSTRUCTIONS

## 2017-09-12 NOTE — TELEPHONE ENCOUNTER
CardioMems reading from today is 17. Ms. Mya Toribio denies a significant urinary response to the Metolazone yesterday, but she did experience hand and leg cramping and some mild lightheadedness. Those symptoms had resolved when she woke this morning. She has taken Bumex 4 mg and Metolazone 5 mg this morning. I have advised her to ensure proper PO intake and hydration today. Will continue with scheduled PM dose of Bumex 2 mg tonight. Plan to resume baseline Bumex regimen of 2mg BID tomorrow morning. Will follow up with PAd reading tomorrow. Encouraged her to call with any questions or concerns.

## 2017-09-12 NOTE — MR AVS SNAPSHOT
Visit Information Date & Time Provider Department Dept. Phone Encounter #  
 9/12/2017  1:15 PM Dalila TempletonIfeoma S Conchis Lawrence [phone] [id number] Follow-up Instructions Return if symptoms worsen or fail to improve. Your Appointments 9/12/2017  2:00 PM  
ESTABLISHED PATIENT with Rj Levine DO [address] [facility] ([facility]) Appt Note: 1 month follow up  
 [address] [id number] [phone]  
  
   
 [address] [address]  
  
    
 9/15/2017  1:20 PM  
ESTABLISHED PATIENT with Deann King MD  
[facility] ([facility]) Appt Note: 2 week f/u  
 [address] [facility] [place] [address] [facility]  
[address] [facility] [place] [address] [address] Upcoming Health Maintenance Date Due  
 FOOT EXAM Q1 12/14/1967 MICROALBUMIN Q1 12/14/1967 EYE EXAM RETINAL OR DILATED Q1 12/14/1967 Pneumococcal 19-64 Medium Risk (1 of 1 - PPSV23) 12/14/1976 DTaP/Tdap/Td series (1 - Tdap) 12/14/1978 PAP AKA CERVICAL CYTOLOGY 12/14/1978 BREAST CANCER SCRN MAMMOGRAM 12/14/2007 LIPID PANEL Q1 11/9/2015 INFLUENZA AGE 9 TO ADULT 8/1/2017 HEMOGLOBIN A1C Q6M 9/3/2017 FOBT Q 1 YEAR AGE 50-75 2/18/2018 MEDICARE YEARLY EXAM 5/18/2018 Allergies as of 9/12/2017  Review Complete On: 9/12/2017 By: Dalila Templeton DO Severity Noted Reaction Type Reactions Contrast Dye [Iodine] High 02/28/2011   Systemic Anaphylaxis Levaquin [Levofloxacin]  10/13/2013    Nausea and Vomiting Morphine  02/28/2011   Side Effect Hives, Itching Current Immunizations  Never Reviewed No immunizations on file. Not reviewed this visit You Were Diagnosed With   
  
 Codes Comments Allergic rhinitis, unspecified allergic rhinitis trigger, unspecified rhinitis seasonality    -  Primary ICD-10-CM: J30.9 ICD-9-CM: 477.9 Vitals BP Temp Resp Height(growth percentile) Weight(growth percentile) BMI  
 134/52 98.3 °F (36.8 °C) (Oral) 18 5' 10\" (1.778 m) 329 lb (149.2 kg) 47.21 kg/m2 OB Status Smoking Status Hysterectomy Former Smoker Vitals History BMI and BSA Data Body Mass Index Body Surface Area  
 47.21 kg/m 2 2.71 m 2 Preferred Pharmacy Pharmacy Name Phone [facility]/PHARMACY #[id number] [phone] Your Updated Medication List  
  
   
This list is accurate as of: 9/12/17  1:54 PM.  Always use your most recent med list.  
  
  
  
  
 albuterol 2.5 mg /3 mL (0.083 %) nebulizer solution Commonly known as:  PROVENTIL VENTOLIN  
  
 allopurinol 100 mg tablet Commonly known as:  Ardegarrison Waite Take 1 Tab by mouth daily. aspirin 81 mg tablet Take 81 mg by mouth daily. atorvastatin 80 mg tablet Commonly known as:  LIPITOR Take 80 mg by mouth every evening. bumetanide 1 mg tablet Commonly known as:  BUMEX  
2 tab PO Qam and 1 tab Qafternoon  
  
 calcitRIOL 0.25 mcg capsule Commonly known as:  ROCALTROL Take 0.25 mcg by mouth two (2) days a week. Patient takes on Monday and Thursday  
  
 carvedilol 25 mg tablet Commonly known as:  COREG  
TAKE 1 TABLET BY MOUTH TWICE A DAY  
  
 cetirizine 5 mg tablet Commonly known as:  ZYRTEC Take 1 Tab by mouth daily. docusate sodium 100 mg capsule Commonly known as:  Nereyda Rout Take 100 mg by mouth nightly. DUONEB 2.5 mg-0.5 mg/3 ml Nebu Generic drug:  albuterol-ipratropium 3 mL by Nebulization route daily as needed. Takes about 4 days a week FLONASE 50 mcg/actuation nasal spray Generic drug:  fluticasone 2 Sprays by Both Nostrils route nightly as needed. hydrOXYzine pamoate 25 mg capsule Commonly known as:  VISTARIL Take 1 Cap by mouth three (3) times daily as needed for Anxiety. imipramine 25 mg tablet Commonly known as:  TOFRANIL Take 1 Tab by mouth nightly. For esophageal spasm  
  
 isosorbide mononitrate  mg CR tablet Commonly known as:  IMDUR  
TAKE 1 TABLET BY MOUTH EVERY DAY  
  
 magnesium oxide 400 mg tablet Commonly known as:  MAG-OX Take 1 Tab by mouth two (2) times a day. MIRALAX 17 gram packet Generic drug:  polyethylene glycol Take 17 g by mouth daily as needed. nitroglycerin 0.3 mg SL tablet Commonly known as:  NITROSTAT  
1 Tab by SubLINGual route every five (5) minutes as needed for Chest Pain. NovoLOG Mix 70-30 100 unit/mL (70-30) injection Generic drug:  insulin aspart protamine/insulin aspart  
by SubCUTAneous route. Uses sliding scale  
  
 ondansetron 4 mg disintegrating tablet Commonly known as:  ZOFRAN ODT Take 1 Tab by mouth every six (6) hours as needed for Nausea. * oxyCODONE-acetaminophen 5-325 mg per tablet Commonly known as:  PERCOCET Take 1 Tab by mouth every twelve (12) hours as needed for Pain. Max Daily Amount: 2 Tabs. * oxyCODONE-acetaminophen  mg per tablet Commonly known as:  PERCOCET 10 Take 1 Tab by mouth every four (4) hours as needed for Pain. Max Daily Amount: 6 Tabs. * oxyCODONE-acetaminophen 5-325 mg per tablet Commonly known as:  PERCOCET TAKE 1 TABLET BY MOUTH EVERY 6 HOURS AS NEEDED FOR PAIN, MAX DAILY AMOUNT OF 4 TABLETS  
  
 pantoprazole 40 mg tablet Commonly known as:  PROTONIX Take 1 Tab by mouth daily. Indications: GASTROESOPHAGEAL REFLUX Senna 8.6 mg tablet Generic drug:  senna Take 2 Tabs by mouth nightly. VITAMIN D2 50,000 unit capsule Generic drug:  ergocalciferol Take 50,000 Units by mouth every Tuesday and Thursday. warfarin 2 mg tablet Commonly known as:  COUMADIN  
1.5 tabs PO daily except fri and mon take 2mg * Notice:   This list has 3 medication(s) that are the same as other medications prescribed for you. Read the directions carefully, and ask your doctor or other care provider to review them with you. Prescriptions Sent to Pharmacy Refills  
 cetirizine (ZYRTEC) 5 mg tablet 11 Sig: Take 1 Tab by mouth daily. Class: Normal  
 Pharmacy: [facility]/pharmacy #[id number], [address] [place] [address] #: [phone] Route: Oral  
  
Follow-up Instructions Return if symptoms worsen or fail to improve. Patient Instructions A Healthy Lifestyle: Care Instructions Your Care Instructions A healthy lifestyle can help you feel good, stay at a healthy weight, and have plenty of energy for both work and play. A healthy lifestyle is something you can share with your whole family. A healthy lifestyle also can lower your risk for serious health problems, such as high blood pressure, heart disease, and diabetes. You can follow a few steps listed below to improve your health and the health of your family. Follow-up care is a key part of your treatment and safety. Be sure to make and go to all appointments, and call your doctor if you are having problems. Its also a good idea to know your test results and keep a list of the medicines you take. How can you care for yourself at home? · Do not eat too much sugar, fat, or fast foods. You can still have dessert and treats now and then. The goal is moderation. · Start small to improve your eating habits. Pay attention to portion sizes, drink less juice and soda pop, and eat more fruits and vegetables. ¨ Eat a healthy amount of food. A 3-ounce serving of meat, for example, is about the size of a deck of cards. Fill the rest of your plate with vegetables and whole grains. ¨ Limit the amount of soda and sports drinks you have every day. Drink more water when you are thirsty. ¨ Eat at least 5 servings of fruits and vegetables every day.  It may seem like a lot, but it is not hard to reach this goal. A serving or helping is 1 piece of fruit, 1 cup of vegetables, or 2 cups of leafy, raw vegetables. Have an apple or some carrot sticks as an afternoon snack instead of a candy bar. Try to have fruits and/or vegetables at every meal. 
· Make exercise part of your daily routine. You may want to start with simple activities, such as walking, bicycling, or slow swimming. Try to be active 30 to 60 minutes every day. You do not need to do all 30 to 60 minutes all at once. For example, you can exercise 3 times a day for 10 or 20 minutes. Moderate exercise is safe for most people, but it is always a good idea to talk to your doctor before starting an exercise program. 
· Keep moving. Eleuterio Boron the lawn, work in the garden, or Fixber. Take the stairs instead of the elevator at work. · If you smoke, quit. People who smoke have an increased risk for heart attack, stroke, cancer, and other lung illnesses. Quitting is hard, but there are ways to boost your chance of quitting tobacco for good. ¨ Use nicotine gum, patches, or lozenges. ¨ Ask your doctor about stop-smoking programs and medicines. ¨ Keep trying. In addition to reducing your risk of diseases in the future, you will notice some benefits soon after you stop using tobacco. If you have shortness of breath or asthma symptoms, they will likely get better within a few weeks after you quit. · Limit how much alcohol you drink. Moderate amounts of alcohol (up to 2 drinks a day for men, 1 drink a day for women) are okay. But drinking too much can lead to liver problems, high blood pressure, and other health problems. Family health If you have a family, there are many things you can do together to improve your health. · Eat meals together as a family as often as possible. · Eat healthy foods. This includes fruits, vegetables, lean meats and dairy, and whole grains. · Include your family in your fitness plan. Most people think of activities such as jogging or tennis as the way to fitness, but there are many ways you and your family can be more active. Anything that makes you breathe hard and gets your heart pumping is exercise. Here are some tips: 
¨ Walk to do errands or to take your child to school or the bus. ¨ Go for a family bike ride after dinner instead of watching TV. Where can you learn more? Go to http://lindsay-kai.info/. Enter N460 in the search box to learn more about \"A Healthy Lifestyle: Care Instructions. \" Current as of: July 26, 2016 Content Version: 11.3 © 9667-7707 [employer]. Care instructions adapted under license by [employer] (which disclaims liability or warranty for this information). If you have questions about a medical condition or this instruction, always ask your healthcare professional. [address] any warranty or liability for your use of this information. Introducing [facility] & HEALTH SERVICES! Dear Gilberto Carpio: Thank you for requesting a [employer] account. Our records indicate that you already have an active Exara account. You can access your account anytime at [email or web address] Did you know that you can access your hospital and ER discharge instructions at any time in Exara? You can also review all of your test results from your hospital stay or ER visit. Additional Information If you have questions, please visit the Frequently Asked Questions section of the Exara website at [email or web address] Remember, Exara is NOT to be used for urgent needs. For medical emergencies, dial 911. Now available from your iPhone and Android! Please provide this summary of care documentation to your next provider. Your primary care clinician is listed as Jermain Medina.  If you have any questions after today's visit, please call [phone].

## 2017-09-12 NOTE — PROGRESS NOTES
Polly Panchal is a 63 y.o. female   Chief Complaint   Patient presents with    Medication Refill    pt here for INR check and is currently at 2.1. Pt also reports sinus congestion and drainage. No fever no chills. she is a 63y.o. year old female who presents for evalution. Reviewed PmHx, RxHx, FmHx, SocHx, AllgHx and updated and dated in the chart. Review of Systems - negative except as listed above in the HPI    Objective:     Vitals:    09/12/17 1329   BP: 134/52   Resp: 18   Temp: 98.3 °F (36.8 °C)   TempSrc: Oral   Weight: 329 lb (149.2 kg)   Height: 5' 10\" (1.778 m)       Current Outpatient Prescriptions   Medication Sig    cetirizine (ZYRTEC) 5 mg tablet Take 1 Tab by mouth daily.  hydrOXYzine pamoate (VISTARIL) 25 mg capsule Take 1 Cap by mouth three (3) times daily as needed for Anxiety.  oxyCODONE-acetaminophen (PERCOCET) 5-325 mg per tablet TAKE 1 TABLET BY MOUTH EVERY 6 HOURS AS NEEDED FOR PAIN, MAX DAILY AMOUNT OF 4 TABLETS    allopurinol (ZYLOPRIM) 100 mg tablet Take 1 Tab by mouth daily.  oxyCODONE-acetaminophen (PERCOCET 10)  mg per tablet Take 1 Tab by mouth every four (4) hours as needed for Pain. Max Daily Amount: 6 Tabs.  imipramine (TOFRANIL) 25 mg tablet Take 1 Tab by mouth nightly. For esophageal spasm    warfarin (COUMADIN) 2 mg tablet 1.5 tabs PO daily except fri and mon take 2mg    oxyCODONE-acetaminophen (PERCOCET) 5-325 mg per tablet Take 1 Tab by mouth every twelve (12) hours as needed for Pain. Max Daily Amount: 2 Tabs.  bumetanide (BUMEX) 1 mg tablet 2 tab PO Qam and 1 tab Qafternoon    magnesium oxide (MAG-OX) 400 mg tablet Take 1 Tab by mouth two (2) times a day.  polyethylene glycol (MIRALAX) 17 gram packet Take 17 g by mouth daily as needed.  senna (SENNA) 8.6 mg tablet Take 2 Tabs by mouth nightly.  docusate sodium (COLACE) 100 mg capsule Take 100 mg by mouth nightly.     insulin aspart protamine/insulin aspart (NOVOLOG MIX 70-30) 100 unit/mL (70-30) injection by SubCUTAneous route. Uses sliding scale    ondansetron (ZOFRAN ODT) 4 mg disintegrating tablet Take 1 Tab by mouth every six (6) hours as needed for Nausea. (Patient taking differently: Take 4 mg by mouth as needed for Nausea.)    albuterol-ipratropium (DUONEB) 2.5 mg-0.5 mg/3 ml nebu 3 mL by Nebulization route daily as needed. Takes about 4 days a week    fluticasone (FLONASE) 50 mcg/actuation nasal spray 2 Sprays by Both Nostrils route nightly as needed.  calcitRIOL (ROCALTROL) 0.25 mcg capsule Take 0.25 mcg by mouth two (2) days a week. Patient takes on Monday and Thursday    isosorbide mononitrate ER (IMDUR) 120 mg CR tablet TAKE 1 TABLET BY MOUTH EVERY DAY    carvedilol (COREG) 25 mg tablet TAKE 1 TABLET BY MOUTH TWICE A DAY    pantoprazole (PROTONIX) 40 mg tablet Take 1 Tab by mouth daily. Indications: GASTROESOPHAGEAL REFLUX    nitroglycerin (NITROSTAT) 0.3 mg SL tablet 1 Tab by SubLINGual route every five (5) minutes as needed for Chest Pain.  ergocalciferol (VITAMIN D2) 50,000 unit capsule Take 50,000 Units by mouth every Tuesday and Thursday.  aspirin 81 mg tablet Take 81 mg by mouth daily.  atorvastatin (LIPITOR) 80 mg tablet Take 80 mg by mouth every evening.  albuterol (PROVENTIL VENTOLIN) 2.5 mg /3 mL (0.083 %) nebulizer solution      No current facility-administered medications for this visit. Physical Examination: General appearance - alert, well appearing, and in no distress  Eyes - pupils equal and reactive, extraocular eye movements intact  Nose - mucosal congestion  Chest - clear to auscultation, no wheezes, rales or rhonchi, symmetric air entry  Heart - normal rate, regular rhythm, normal S1, S2, no murmurs, rubs, clicks or gallops      Assessment/ Plan:   Diagnoses and all orders for this visit:    1.  Allergic rhinitis, unspecified allergic rhinitis trigger, unspecified rhinitis seasonality  -     cetirizine (ZYRTEC) 5 mg tablet; Take 1 Tab by mouth daily. 2. Medication monitoring encounter  -     AMB POC PT/INR   c/w current dosing recheck 1 month  Follow-up Disposition:  Return if symptoms worsen or fail to improve. I have discussed the diagnosis with the patient and the intended plan as seen in the above orders. The patient has received an after-visit summary and questions were answered concerning future plans. Pt conveyed understanding of plan.     Medication Side Effects and Warnings were discussed with patient      Karen Hernandez, DO

## 2017-09-13 ENCOUNTER — PATIENT OUTREACH (OUTPATIENT)
Dept: FAMILY MEDICINE CLINIC | Age: 62
End: 2017-09-13

## 2017-09-13 NOTE — PROGRESS NOTES
S/P [facility]/Cath Lab, 8/31/17, Right heart cath + CardioMEMS. 9/13/17, Chart review, no further ED/Hospitalizations noted at this time. Patient continues to keep F/U appts, seen by Dr Belen Burrell, 9/12/17(INR check and is currently at 2.1, recommend continue current dose and recheck in 1 month, scheduled for 10/10/17) and has scheduled F/U appt with Cardiology/Dr Maria Luz Phillips, 9/15/17(See NP,Randy outreach/medication adjustments, 9/11/17 and 9/12/17). PLAN: Should patient return call or be seen for F/U appt, discuss purpose of call, recent CardioMEMS/procedure and importance of F/U appts. Discuss symptoms or concerns, medications/?Coumadin/?INR, ?daily weights, ?restrictions/progress, and F/U appts. Provide instruction, goal work and [employer] as indicated. See Previous NN/Patient Outreach Documentation:  9/5/17, Chart Review. Patient kept F/U appt today, 9/5/17 with Dr Irma Kent at 1.3 per Dr Belen Burrell, Double coumadin dose today then resume normal schedule recheck 1 week) and has scheduled F/U appt for 1 week, 9/12/17. See Previous NN/Patient Outreach Documentation:  9/1/17, This writer/NN received message with alert of hospitalization, per chart review, patient seen at [facility]/Cath Lab, 8/31/17, Right heart cath + CardioMEMS  Per Dr Maria Luz Phillips:  Mrs. Sha Alcantara has chronic diastolic HF in the setting of morbid obesity, HTN, CAD, JASEN and advanced CKD. She also has significant interstitial lung disease. She continues to have class 3-4 LARA on continuous Oxygen. Her volume status is uncertain. Cath 2014 demonstrated stable CAD with RCA . EDP was 25, PAD 20. Stress nuclear study 1 year ago was normal. Echo February 2017 demonstrated vigorous LV function.     It may be reasonable to consider right heart cath to better assess hemodynamics. She may be a good candidate for CardioMEMS.      Per documentation, [facility]/Inpatient NN/ Vaibhav Cannon met with patient and provided patient education for Cardiomems monitoring at home. Lloyd Paiz patient current understanding of device.  Patient states she understands she lays on a pillow at home to send data to Dr Anjel Woody to help manage her fluid status. Per Dr Anjel Woody:  Resume coumadin from this evening 3mg a day  Please come to [address] [place] [address] tomorrow afternoon to have your sutures removed  Please have nurses draw blood to check on kidney function, copy of results to Dr Elizabeth Zavala  Please get INR check done on Tuesday. Recommended to F/U with Cardiology in 1 week and has F/U appt with Dr Zahida Cabrera, 9/12/17.  9/1/17, Chart Review, patient continues to keep F/U appts with Dr Zahida Cabrera, seen recently, 8/15/17 and Cardiology/Dr Anjel Woody, 8/18/17.  9/1/17, This writer/NN attempted to contact patient at listed phone number, left voicemail message with [facility]/NN contact information and request for return call. See Previous NN/Patient Outreach Documentation:  6/7/17, Chart Review. No further ED/Hospitalizations noted at this time. Patient continues to keep regular F/U appts with Dr Zahida Cabrera. Patient seen today, 6/7/17. Dr Zahida Cabrera agrees to notify this writer/NN of further NN needs/need for further assistance. See Previous NN/Patient Outreach Documentation:  4/14/17, This writer/NN and [facility] access, able to confirm,patient hospitalized, 4/2/17 - 4/11/17 at [facility] related to Chest Pain and known Interstitial Lung Disease. Printed Discharge Summary for Dr Zahida Cabrera to review. Per Discharge Summary:  Cardiology Work-up, Serial Cardiac Enzymes, ECHO(EF 23-03%, Grade I Diastolic Dysfunction), and negative stress test, cleared for discharge. PULM, continue outpatient F/U, ICS(Dulera 2 puffs, twice daily) and home medications. 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   H/O DVT/Anticoagulation, Discharged on Coumadin, INR at discharge 2.3  Urology, CT complex renal cyst and renal ultrasound with bilateral complex cyst.  Patient also receiving Ergocalciferol(Tuesday and Thursday). Patient instructed to F/U with PCP for INR, CBC and renal profile. Also to have PULM and Renal F/U appts. Patient instructed to follow 1.5 L/day fluid restriction. Patient S/P 2/28/17 - 3/10/17 at [facility] related to Pneumonia/COPD Exacerbation. Patient kept F/U appt with Dr Adrienne Traore, 3/15/17 and Dr Delmi Cueto, 3/22/17. Also kept scheduled appt with Dr Adrienne Traore, 3/29/17 at 2:30pm  4/4/17, Per chart review/this writer/NN and [facility] access, patient has required further hospitalization, admitted, 4/2/17 to [facility] related to chest pain and severe Hypokalemia(K+ at 2.9). Aso of note, chronic kidney disease(BUN/Cr at 80/4.7), nearing dialysis, Renal Consult. See Previous NN/Patient Outreach Documentation:   S/P [facility], 2/16/17 - 2/20/17 related to CHF Exacerbation. Patient kept F/U appt with Dr Adrienne Traore, 2/23/17, next scheduled appt, 3/9/17, to see PULM, 2/28/17. Patient kept PULM F/U appt, 2/28/17 and is being sent back to ED/further medical evaluation. S/P [facility], 2/2/17 - 2/10/17 related to Resp. Failure/Interstitial Lung Disease, DVT, Diastolic HF, PULM HTN and CKD. 2/16/17, Patient in office to establish PCP with Dr Adrienne Traore, reports recent [facility] hospitalization and ED visit. Dr Adrienne Traore contacted this writer/NN with concerns of 30 pound weight gain since discharge, INR at 5. 4(recent DVT on coumadin), Diabetes, CHF,and CKD, recommends patient return to [facility] for further medical evaluation

## 2017-09-15 ENCOUNTER — OFFICE VISIT (OUTPATIENT)
Dept: CARDIOLOGY CLINIC | Age: 62
End: 2017-09-15

## 2017-09-15 ENCOUNTER — HOSPITAL ENCOUNTER (OUTPATIENT)
Dept: LAB | Age: 62
Discharge: HOME | End: 2017-09-15
Payer: MEDICARE

## 2017-09-15 VITALS
BODY MASS INDEX: 41.95 KG/M2 | OXYGEN SATURATION: 100 % | DIASTOLIC BLOOD PRESSURE: 64 MMHG | SYSTOLIC BLOOD PRESSURE: 152 MMHG | RESPIRATION RATE: 20 BRPM | HEART RATE: 70 BPM | HEIGHT: 70 IN | WEIGHT: 293 LBS

## 2017-09-15 DIAGNOSIS — J44.9 COPD, SEVERE (HCC): ICD-10-CM

## 2017-09-15 DIAGNOSIS — Z99.89 OSA ON CPAP: Chronic | ICD-10-CM

## 2017-09-15 DIAGNOSIS — Z79.01 WARFARIN ANTICOAGULATION: ICD-10-CM

## 2017-09-15 DIAGNOSIS — E11.22 TYPE 2 DIABETES MELLITUS WITH STAGE 3 CHRONIC KIDNEY DISEASE, WITH LONG-TERM CURRENT USE OF INSULIN (HCC): Chronic | ICD-10-CM

## 2017-09-15 DIAGNOSIS — J84.9 ILD (INTERSTITIAL LUNG DISEASE) (HCC): ICD-10-CM

## 2017-09-15 DIAGNOSIS — Z95.9 S/P LEFT PULMONARY ARTERY PRESSURE SENSOR IMPLANT PLACEMENT: ICD-10-CM

## 2017-09-15 DIAGNOSIS — I50.32 CHRONIC DIASTOLIC HEART FAILURE (HCC): Primary | Chronic | ICD-10-CM

## 2017-09-15 DIAGNOSIS — I25.118 ATHEROSCLEROSIS OF NATIVE CORONARY ARTERY OF NATIVE HEART WITH STABLE ANGINA PECTORIS (HCC): Chronic | ICD-10-CM

## 2017-09-15 DIAGNOSIS — G47.33 OSA ON CPAP: Chronic | ICD-10-CM

## 2017-09-15 DIAGNOSIS — E55.9 VITAMIN D DEFICIENCY: ICD-10-CM

## 2017-09-15 DIAGNOSIS — E66.01 MORBID OBESITY DUE TO EXCESS CALORIES (HCC): Chronic | ICD-10-CM

## 2017-09-15 DIAGNOSIS — N18.4 CKD (CHRONIC KIDNEY DISEASE) STAGE 4, GFR 15-29 ML/MIN (HCC): Chronic | ICD-10-CM

## 2017-09-15 DIAGNOSIS — N18.30 TYPE 2 DIABETES MELLITUS WITH STAGE 3 CHRONIC KIDNEY DISEASE, WITH LONG-TERM CURRENT USE OF INSULIN (HCC): Chronic | ICD-10-CM

## 2017-09-15 DIAGNOSIS — Z79.4 TYPE 2 DIABETES MELLITUS WITH STAGE 3 CHRONIC KIDNEY DISEASE, WITH LONG-TERM CURRENT USE OF INSULIN (HCC): Chronic | ICD-10-CM

## 2017-09-15 DIAGNOSIS — I10 ESSENTIAL HYPERTENSION: Chronic | ICD-10-CM

## 2017-09-15 DIAGNOSIS — I27.20 PULMONARY HTN (HCC): Chronic | ICD-10-CM

## 2017-09-15 PROCEDURE — 83880 ASSAY OF NATRIURETIC PEPTIDE: CPT

## 2017-09-15 PROCEDURE — 36415 COLL VENOUS BLD VENIPUNCTURE: CPT

## 2017-09-15 PROCEDURE — 84550 ASSAY OF BLOOD/URIC ACID: CPT

## 2017-09-15 PROCEDURE — 80048 BASIC METABOLIC PNL TOTAL CA: CPT

## 2017-09-15 RX ORDER — ISOSORBIDE MONONITRATE 120 MG/1
TABLET, EXTENDED RELEASE ORAL
Qty: 90 TAB | Refills: 3 | Status: CANCELLED | OUTPATIENT
Start: 2017-09-15

## 2017-09-15 NOTE — PROGRESS NOTES
History of Present Illness    Broderick Mayo is a 63 y.o. female. Last seen 1 month ago. Here for follow up from right heart cath, CardioMEMS implantation 8/31/17.      Problem List  Date Reviewed: 9/12/2017          Codes Class Noted    Angina, class III ([facility] ICD-10-CM: I20.9  ICD-9-CM: 413.9  8/9/2013        Chronic diastolic heart failure ([facility] (Chronic) ICD-10-CM: I50.32  ICD-9-CM: 428.32  10/5/2012        CAD (coronary Artery Disease) Native Artery (Chronic) ICD-10-CM: I25.10  ICD-9-CM: 414.01  2/16/2011    Overview Addendum 10/5/2012  7:33 AM by PRASHANT Cuenca     [place] 2004  1v CAD by cath - PCI OM with SAL  Cath 5/2004 - patent stent, no new disease  Lexiscan cardiolite 12/09- normal perfusion, EF 66%  Cath: 3/19/12: PA 55/30 mean 41, wedge 26, RA 24, EDP 35, LVG 55%, LM normal, LAD normal, LCX - OM1 patent stent, OM2 prox 85% long, % with L to R collaterals                S/P left pulmonary artery pressure sensor implant placement ICD-10-CM: Z95.9  ICD-9-CM: V45.89  8/31/2017    Overview Signed 8/31/2017  9:53 AM by Akbar Wyatt RN     [facility]              ILD (interstitial lung disease) ([facility] ICD-10-CM: J84.9  ICD-9-CM: 515  8/20/2017        Warfarin anticoagulation ICD-10-CM: Z79.01  ICD-9-CM: V58.61  8/20/2017        COPD, severe ([facility] ICD-10-CM: J44.9  ICD-9-CM: 496  6/21/2017        CKD (chronic kidney disease) stage 4, GFR 15-29 ml/min ([facility]) (Chronic) ICD-10-CM: N18.4  ICD-9-CM: 585.4  2/10/2017        DVT (deep venous thrombosis) ([facility] ICD-10-CM: I82.409  ICD-9-CM: 453.40  2/2/2017        DM (diabetes mellitus), type 2 with renal complications (HCC) (Chronic) ICD-10-CM: E11.29  ICD-9-CM: 250.40  8/9/2013        Vitamin D deficiency ICD-10-CM: E55.9  ICD-9-CM: 268.9  8/9/2013        Normocytic anemia (Chronic) ICD-10-CM: D64.9  ICD-9-CM: 285.9  5/26/2013        DJD (degenerative joint disease) of knee ICD-10-CM: M17.10  ICD-9-CM: 715.36  10/30/2012        HTN (hypertension) (Chronic) ICD-10-CM: I10  ICD-9-CM: 401.9  10/1/2012        Morbid obesity (Chronic) ICD-10-CM: E66.01  ICD-9-CM: 278.01  10/1/2012        Esophageal reflux ICD-10-CM: K21.9  ICD-9-CM: 530.81  10/1/2012        Gastroparesis ICD-10-CM: K31.84  ICD-9-CM: 536.3  10/1/2012        Pulmonary HTN (HCC) (Chronic) ICD-10-CM: I27.2  ICD-9-CM: 416.8  3/21/2012        JASEN on CPAP (Chronic) ICD-10-CM: N63.00, Z99.89  ICD-9-CM: 327.23, V46.8  2/16/2011    Overview Signed 3/21/2012  8:04 AM by JUSTICE BeanP     Dr. Annabella Macdonald CPAP                   Cardiac Testing  CATH: 2004: 1v CAD by cath - PCI OM with SAL  CATH 5/2004 - patent stent, no new disease   Lexiscan cardiolite 12/09- normal perfusion, EF 66%  ECHO 11/2009: LVH, EF 67%, diastolic dysfunction  STRESS/LEXISCAN/CARDIOLITE 3/29/11: normal perfusion, EF 62%  CATH: 3/20/2012 :PA 55/30 mean 41, wedge 26, RA 24, EDP 35, LVG 55%, LM normal, LAD normal, LCX - OM1 patent stent, OM2 prox 85% long, % w/ L -> R collateral; s/p SAL-> OM2/OM3 with SAL. CATH: 10/4/2012: EDP 25, EF 55%, LM nl, LAD mild plaque, LCX patent OM stents, % prox with good L to R collaterals. Cath 3/18/2014 - RA 9 PA 42/19/29, PCW 12, EDP 25, no LVG due to CKD, LM nl, LAD mild plaque, LCX patent stents OM1/OM2, RCA chronic proximal occlusion with L to R collaterals. ECHO 4/11/16: EF 60-65%. No WMA. LA mildly dilated. Lexiscan Cardiolite 4/11/16: Normal. LVEF 55%. Compared to 3/29/11, no significant changes. RHC/CardioMEMS implant 8/31/17 - RA 12, PA 56/20/30, CI ([place]) 2.65      HPI    Implant PAD was 20. Gradual escalation in her diuretic dosing coupled with 2 doses of Metolazone have reduced her PAD, now at 14 today. She has had leg cramping in the past on Metolazone but states it might have been too much as she was also on Bumex at the time. She notes she feels better overall and has lost 16 lbs. Her shortness of breath has improved.   Patient was limited from activity secondary to gout but notes she is feeling better and plans to start pulmonary rehab. She walks around her house and tries not to sit around. She limits her food intake but has had difficulty losing weight. She  Notes her sugars have improved and she has only had to take insulin 3 times a week. She has been doing well with the pillow but wants to adjust the times she uses it. Patient denies any exertional chest pain, palpitations, syncope, orthopnea, or paroxysmal nocturnal dyspnea. She is here with her  today. Current Outpatient Prescriptions on File Prior to Visit   Medication Sig Dispense Refill    pantoprazole (PROTONIX) 40 mg tablet TAKE 1 TABLET BY MOUTH EVERY DAY FOR HEARTBURN 90 Tab 0    cetirizine (ZYRTEC) 5 mg tablet Take 1 Tab by mouth daily. 30 Tab 11    hydrOXYzine pamoate (VISTARIL) 25 mg capsule Take 1 Cap by mouth three (3) times daily as needed for Anxiety. 30 Cap 1    oxyCODONE-acetaminophen (PERCOCET) 5-325 mg per tablet TAKE 1 TABLET BY MOUTH EVERY 6 HOURS AS NEEDED FOR PAIN, MAX DAILY AMOUNT OF 4 TABLETS 20 Tab 0    allopurinol (ZYLOPRIM) 100 mg tablet Take 1 Tab by mouth daily. 30 Tab 5    imipramine (TOFRANIL) 25 mg tablet Take 1 Tab by mouth nightly. For esophageal spasm (Patient taking differently: Take 25 mg by mouth as needed. For esophageal spasm) 30 Tab 5    warfarin (COUMADIN) 2 mg tablet 1.5 tabs PO daily except fri and mon take 2mg 30 Tab 5    albuterol (PROVENTIL VENTOLIN) 2.5 mg /3 mL (0.083 %) nebulizer solution       bumetanide (BUMEX) 1 mg tablet 2 tab PO Qam and 1 tab Qafternoon (Patient taking differently: 2 mg two (2) times a day.) 90 Tab 3    insulin aspart protamine/insulin aspart (NOVOLOG MIX 70-30) 100 unit/mL (70-30) injection by SubCUTAneous route. Uses sliding scale      ondansetron (ZOFRAN ODT) 4 mg disintegrating tablet Take 1 Tab by mouth every six (6) hours as needed for Nausea.  (Patient taking differently: Take 4 mg by mouth as needed for Nausea.) 30 Tab 0    fluticasone (FLONASE) 50 mcg/actuation nasal spray 2 Sprays by Both Nostrils route nightly as needed.  calcitRIOL (ROCALTROL) 0.25 mcg capsule Take 0.25 mcg by mouth four (4) days a week. Patient takes on Monday and Thursday       isosorbide mononitrate ER (IMDUR) 120 mg CR tablet TAKE 1 TABLET BY MOUTH EVERY DAY 30 Tab 5    carvedilol (COREG) 25 mg tablet TAKE 1 TABLET BY MOUTH TWICE A  Tab 3    ergocalciferol (VITAMIN D2) 50,000 unit capsule Take 50,000 Units by mouth every Tuesday and Thursday.  aspirin 81 mg tablet Take 81 mg by mouth daily.  atorvastatin (LIPITOR) 80 mg tablet Take 80 mg by mouth every evening.  nitroglycerin (NITROSTAT) 0.3 mg SL tablet 1 Tab by SubLINGual route every five (5) minutes as needed for Chest Pain. 1 Bottle 1     No current facility-administered medications on file prior to visit. Allergies   Allergen Reactions    Contrast Dye [Iodine] Anaphylaxis    Levaquin [Levofloxacin] Nausea and Vomiting    Morphine Hives and Itching     Lives in [place]. Review of Systems  Constitutional: Negative for fever, chills, malaise/fatigue and diaphoresis. Respiratory: Negative for cough, hemoptysis, sputum production, and wheezing. Positive for LARA. Cardiovascular: Negative for palpitations, orthopnea, claudication, and PND. Positive for chest pressure. Gastrointestinal: Negative for nausea, vomiting, blood in stool and melena. Genitourinary: Negative for dysuria and flank pain. Musculoskeletal: Positive for R calf pain. Skin: Negative for rash. Neurological: Negative for focal weakness, seizures, loss of consciousness, weakness and headaches. Endo/Heme/Allergies: Does not bruise/bleed easily. Psychiatric/Behavioral: Negative for memory loss. The patient does not have insomnia.       Visit Vitals    /64 (BP 1 Location: Left arm, BP Patient Position: Sitting)    Pulse 70    Resp 20    Ht 5' 10\" (1.778 m)    Wt 321 lb (145.6 kg)    SpO2 100%    BMI 46.06 kg/m2     Wt Readings from Last 3 Encounters:   09/15/17 321 lb (145.6 kg)   09/12/17 329 lb (149.2 kg)   09/05/17 335 lb (152 kg)       Physical Exam  General - well developed well nourished  Neck - JVP normal, thyroid normal  Cardiac - normal S1,S2, no murmurs, rubs or gallops. No clicks  Vascular - carotids without bruits, radials, femorals and pedal pulses equal bilateral  Lungs - clear to auscultation bilaterals, no rales, wheezing or rhonchi  Abd - soft nontender, no HSM, no abd bruits  Extremities - trace ankle edema  Skin - no rash  Neuro - nonfocal  Psych - normal mood and affect    Cardiographics  EKG 2/14 - Normal sinus rhythm, low voltage, otherwise normal EKG  Echo 02/04/17- LVEF 75%     ASSESSMENT and PLAN  Encounter Diagnoses   Name Primary?  Chronic diastolic heart failure (HCC) Yes    Essential hypertension     Atherosclerosis of native coronary artery of native heart with stable angina pectoris (HCC)     CKD (chronic kidney disease) stage 4, GFR 15-29 ml/min (HCC)     JASEN on CPAP     Pulmonary HTN (HCC)     Type 2 diabetes mellitus with stage 3 chronic kidney disease, with long-term current use of insulin (HCC)     Vitamin D deficiency     COPD, severe (HCC)     Warfarin anticoagulation     S/P left pulmonary artery pressure sensor implant placement     ILD (interstitial lung disease) ([address]     Morbid obesity due to excess calories ([address]      This is her first office visit following CardioMEMs implantation two weeks ago in the setting of chronic diastolic HF, morbid obesity, HTN, CAD, JASEN, advanced CKD and significant interstitial lung disease. Her PAD has dropped from 20 to 14 with a 16 lbs weight loss. She continues to have class 3+ LARA on continuous oxygen but she has no peripheral edema. Will continue Bumex 2 mg BID for now and keep her PAD range 12-16. Would try to minimize use of Metolazone due to leg cramping.  Favor dose escalation in morning Bumex for rising PAD. Will reevaluate renal function today with BMP. Will also recheck uric acid given her recent gout and proBNP. Follow-up Disposition:  Return in about 1 month (around 10/15/2017).     Written by Roberta Maddox, as dictated by Samuel Cannon MD.   Samuel Cannon MD

## 2017-09-15 NOTE — MR AVS SNAPSHOT
Visit Information Date & Time Provider Department Dept. Phone Encounter #  
 9/15/2017  1:20 PM Onur Ellison MD [facility] [place] [phone] [id number] Follow-up Instructions Return in about 1 month (around 10/15/2017). Your Appointments 10/10/2017  1:30 PM  
ESTABLISHED PATIENT with Baker Canavan Lobb, DO [address] [facility] ([address]) Appt Note: 1 month follow up  
 [address] [address]  
[phone]  
  
   
 [address] [address]  
  
    
 10/20/2017  2:40 PM  
ESTABLISHED PATIENT with Onur Ellison MD  
[facility] ([facility]) [address] [place] [phone] [facility]  
[address] [facility] [place] [id number] [address] Upcoming Health Maintenance Date Due  
 FOOT EXAM Q1 12/14/1967 MICROALBUMIN Q1 12/14/1967 EYE EXAM RETINAL OR DILATED Q1 12/14/1967 Pneumococcal 19-64 Medium Risk (1 of 1 - PPSV23) 12/14/1976 DTaP/Tdap/Td series (1 - Tdap) 12/14/1978 PAP AKA CERVICAL CYTOLOGY 12/14/1978 BREAST CANCER SCRN MAMMOGRAM 12/14/2007 LIPID PANEL Q1 11/9/2015 INFLUENZA AGE 9 TO ADULT 8/1/2017 HEMOGLOBIN A1C Q6M 9/3/2017 FOBT Q 1 YEAR AGE 50-75 2/18/2018 MEDICARE YEARLY EXAM 5/18/2018 Allergies as of 9/15/2017  Review Complete On: 9/12/2017 By: Tom Long DO Severity Noted Reaction Type Reactions Contrast Dye [Iodine] High 02/28/2011   Systemic Anaphylaxis Levaquin [Levofloxacin]  10/13/2013    Nausea and Vomiting Morphine  02/28/2011   Side Effect Hives, Itching Current Immunizations  Never Reviewed No immunizations on file. Not reviewed this visit You Were Diagnosed With   
  
 Codes Comments Chronic diastolic heart failure (HCC)    -  Primary ICD-10-CM: I50.32 
ICD-9-CM: 428.32  Essential hypertension     ICD-10-CM: I10 
 ICD-9-CM: 401.9 Atherosclerosis of native coronary artery of native heart with stable angina pectoris ([facility]     ICD-10-CM: I25.118 
ICD-9-CM: 414.01, 413.9 CKD (chronic kidney disease) stage 4, GFR 15-29 ml/min ([facility])     ICD-10-CM: N18.4 ICD-9-CM: 585.4 JASEN on CPAP     ICD-10-CM: G47.33, Z99.89 ICD-9-CM: 327.23, V46.8 Pulmonary HTN ([facility]     ICD-10-CM: I27.2 ICD-9-CM: 416.8 Type 2 diabetes mellitus with stage 3 chronic kidney disease, with long-term current use of insulin ([facility])     ICD-10-CM: E11.22, N18.3, Z79.4 ICD-9-CM: 250.40, 585.3, V58.67 Vitamin D deficiency     ICD-10-CM: E55.9 ICD-9-CM: 268.9 COPD, severe ([facility]     ICD-10-CM: J44.9 ICD-9-CM: 899 Warfarin anticoagulation     ICD-10-CM: Z79.01 
ICD-9-CM: V58.61 Vitals BP Pulse Resp Height(growth percentile) Weight(growth percentile) SpO2  
 152/64 (BP 1 Location: Left arm, BP Patient Position: Sitting) 70 20 5' 10\" (1.778 m) 321 lb (145.6 kg) 100% BMI OB Status Smoking Status 46.06 kg/m2 Hysterectomy Former Smoker BMI and BSA Data Body Mass Index Body Surface Area 46.06 kg/m 2 2.68 m 2 Preferred Pharmacy Pharmacy Name Phone [id number]- [facility], [address] [place] [address] [phone] Your Updated Medication List  
  
   
This list is accurate as of: 9/15/17  1:58 PM.  Always use your most recent med list.  
  
  
  
  
 albuterol 2.5 mg /3 mL (0.083 %) nebulizer solution Commonly known as:  PROVENTIL VENTOLIN  
  
 allopurinol 100 mg tablet Commonly known as:  Elder Merles Take 1 Tab by mouth daily. aspirin 81 mg tablet Take 81 mg by mouth daily. atorvastatin 80 mg tablet Commonly known as:  LIPITOR Take 80 mg by mouth every evening. bumetanide 1 mg tablet Commonly known as:  BUMEX  
2 tab PO Qam and 1 tab Qafternoon  
  
 calcitRIOL 0.25 mcg capsule Commonly known as:  ROCALTROL Take 0.25 mcg by mouth four (4) days a week. Patient takes on Monday and Thursday  
  
 carvedilol 25 mg tablet Commonly known as:  COREG  
TAKE 1 TABLET BY MOUTH TWICE A DAY  
  
 cetirizine 5 mg tablet Commonly known as:  ZYRTEC Take 1 Tab by mouth daily. FLONASE 50 mcg/actuation nasal spray Generic drug:  fluticasone 2 Sprays by Both Nostrils route nightly as needed. hydrOXYzine pamoate 25 mg capsule Commonly known as:  VISTARIL Take 1 Cap by mouth three (3) times daily as needed for Anxiety. imipramine 25 mg tablet Commonly known as:  TOFRANIL Take 1 Tab by mouth nightly. For esophageal spasm  
  
 isosorbide mononitrate  mg CR tablet Commonly known as:  IMDUR  
TAKE 1 TABLET BY MOUTH EVERY DAY  
  
 nitroglycerin 0.3 mg SL tablet Commonly known as:  NITROSTAT  
1 Tab by SubLINGual route every five (5) minutes as needed for Chest Pain. NovoLOG Mix 70-30 100 unit/mL (70-30) injection Generic drug:  insulin aspart protamine/insulin aspart  
by SubCUTAneous route. Uses sliding scale  
  
 ondansetron 4 mg disintegrating tablet Commonly known as:  ZOFRAN ODT Take 1 Tab by mouth every six (6) hours as needed for Nausea. oxyCODONE-acetaminophen 5-325 mg per tablet Commonly known as:  PERCOCET TAKE 1 TABLET BY MOUTH EVERY 6 HOURS AS NEEDED FOR PAIN, MAX DAILY AMOUNT OF 4 TABLETS  
  
 pantoprazole 40 mg tablet Commonly known as:  PROTONIX  
TAKE 1 TABLET BY MOUTH EVERY DAY FOR HEARTBURN  
  
 VITAMIN D2 50,000 unit capsule Generic drug:  ergocalciferol Take 50,000 Units by mouth every Tuesday and Thursday. warfarin 2 mg tablet Commonly known as:  COUMADIN  
1.5 tabs PO daily except fri and mon take 2mg We Performed the Following METABOLIC PANEL, BASIC [07638 CPT(R)] NT-PRO BNP [id number] CPT(R)] URIC ACID [id number] CPT(R)] Follow-up Instructions Return in about 1 month (around 10/15/2017). Introducing [facility]! Dear Anne Hurtado: Thank you for requesting a [employer] account. Our records indicate that you already have an active [employer] account. You can access your account anytime at [email or web address] Did you know that you can access your hospital and ER discharge instructions at any time in [employer]? You can also review all of your test results from your hospital stay or ER visit. Additional Information If you have questions, please visit the Frequently Asked Questions section of the [employer] website at [email or web address] Remember, [employer] is NOT to be used for urgent needs. For medical emergencies, dial 911. Now available from your iPhone and Android! Please provide this summary of care documentation to your next provider. Your primary care clinician is listed as Karen Foster. If you have any questions after today's visit, please call [phone].

## 2017-09-17 LAB
BUN SERPL-MCNC: 56 MG/DL (ref 6–24)
BUN/CREAT SERPL: 16 (ref 9–23)
CALCIUM SERPL-MCNC: 8 MG/DL (ref 8.7–10.2)
CHLORIDE SERPL-SCNC: 95 MMOL/L (ref 96–106)
CO2 SERPL-SCNC: 25 MMOL/L (ref 18–29)
CREAT SERPL-MCNC: 3.49 MG/DL (ref 0.57–1)
GLUCOSE SERPL-MCNC: 173 MG/DL (ref 65–99)
INTERPRETATION: NORMAL
Lab: NORMAL
NT-PROBNP SERPL-MCNC: 182 PG/ML (ref 0–287)
POTASSIUM SERPL-SCNC: 4.3 MMOL/L (ref 3.5–5.2)
SODIUM SERPL-SCNC: 143 MMOL/L (ref 134–144)
URATE SERPL-MCNC: 11.3 MG/DL (ref 2.5–7.1)

## 2017-09-18 ENCOUNTER — TELEPHONE (OUTPATIENT)
Dept: CARDIOLOGY CLINIC | Age: 62
End: 2017-09-18

## 2017-09-18 PROBLEM — Z74.09 DECREASED AMBULATION STATUS: Status: RESOLVED | Noted: 2017-06-21 | Resolved: 2017-09-18

## 2017-09-18 RX ORDER — COLCHICINE 0.6 MG/1
TABLET ORAL
Qty: 30 TAB | Refills: 1 | Status: SHIPPED | OUTPATIENT
Start: 2017-09-18 | End: 2017-11-21

## 2017-09-18 RX ORDER — FEBUXOSTAT 40 MG/1
40 TABLET, FILM COATED ORAL DAILY
Qty: 30 TAB | Refills: 2 | Status: SHIPPED | OUTPATIENT
Start: 2017-09-18 | End: 2017-09-19

## 2017-09-18 NOTE — TELEPHONE ENCOUNTER
Lab Results   Component Value Date/Time    Sodium 143 09/15/2017 02:29 PM    Potassium 4.3 09/15/2017 02:29 PM    Chloride 95 09/15/2017 02:29 PM    CO2 25 09/15/2017 02:29 PM    Anion gap 11 09/01/2017 01:38 PM    Glucose 173 09/15/2017 02:29 PM    BUN 56 09/15/2017 02:29 PM    Creatinine 3.49 09/15/2017 02:29 PM    BUN/Creatinine ratio 16 09/15/2017 02:29 PM    GFR est AA 16 09/15/2017 02:29 PM    GFR est non-AA 14 09/15/2017 02:29 PM    Calcium 8.0 09/15/2017 02:29 PM     Lab Results   Component Value Date/Time    Uric acid 11.3 09/15/2017 02:29 PM     Lab Results   Component Value Date/Time    NT pro- 03/02/2017 04:42 AM    NT pro- 02/28/2017 12:55 PM    NT pro- 02/16/2017 01:42 PM    NT pro-BNP 72 07/01/2015 07:42 PM    NT pro- 11/09/2014 12:01 AM     Notified Ms. Sha Alcantara of recent lab results. Note mild increased but within stable range for renal function, following recent diuretic escalation. Potassium remains within normal limits. ProBNP unchanged from baseline. Continue current diuretic regimen. Note further increased in uric acid level in the setting of worsening gout like symptoms. She reports that she was advised not to take Rx Allopurinol at this time by Dr. Belen Burrell. I have asked that she follow up with him to discuss lab results, symptoms and to obtain further instructions.

## 2017-09-18 NOTE — PROGRESS NOTES
Your uric acid level has gone up. You are taking the allopurinol correct? If so I am going to change it to urloic.

## 2017-09-19 DIAGNOSIS — M10.379 ACUTE GOUT DUE TO RENAL IMPAIRMENT INVOLVING FOOT, UNSPECIFIED LATERALITY: Primary | ICD-10-CM

## 2017-09-19 DIAGNOSIS — I82.4Y9 DEEP VEIN THROMBOSIS (DVT) OF PROXIMAL LOWER EXTREMITY, UNSPECIFIED CHRONICITY, UNSPECIFIED LATERALITY (HCC): ICD-10-CM

## 2017-09-19 RX ORDER — OXYCODONE AND ACETAMINOPHEN 5; 325 MG/1; MG/1
TABLET ORAL
Qty: 20 TAB | Refills: 0 | Status: SHIPPED | OUTPATIENT
Start: 2017-09-19 | End: 2017-10-10 | Stop reason: SDUPTHER

## 2017-09-19 RX ORDER — ALLOPURINOL 100 MG/1
100 TABLET ORAL DAILY
Qty: 30 TAB | Refills: 5
Start: 2017-09-19 | End: 2017-10-11 | Stop reason: SDUPTHER

## 2017-09-21 ENCOUNTER — PATIENT OUTREACH (OUTPATIENT)
Dept: FAMILY MEDICINE CLINIC | Age: 62
End: 2017-09-21

## 2017-09-21 NOTE — PROGRESS NOTES
9/21/17, Per chart review, patient keeping F/U appts, seen recently by Cardiology/Dr Kierra Degroot. Per documentation: This is her first office visit following CardioMEMs implantation two weeks ago in the setting of chronic diastolic HF, morbid obesity, HTN, CAD, JASEN, advanced CKD and significant interstitial lung disease. Her PAD has dropped from 20 to 14 with a 16 lbs weight loss. She continues to have class 3+ LARA on continuous oxygen but she has no peripheral edema. Will continue Bumex 2 mg BID for now and keep her PAD range 12-16. PLAN: Continue Transitions of Care/recent CardioMEMS/procedure and importance of F/U appts. Discuss symptoms or concerns, medications/?Coumadin/?INR, ?daily weights, ?restrictions/progress, and F/U appts. Discuss further interest in NN/CCM/Disease Management assistance. Provide instruction, goal work and LocAsian as indicated. See Previous NN/Patient Outreach Documentation:  S/P [facility]/Cath Lab, 8/31/17, Right heart cath + CardioMEMS. 9/13/17, Chart review, no further ED/Hospitalizations noted at this time. Patient continues to keep F/U appts, seen by Dr Tran Monk, 9/12/17(INR check and is currently at 2.1, recommend continue current dose and recheck in 1 month, scheduled for 10/10/17) and has scheduled F/U appt with Cardiology/Dr Kierra Degroot, 9/15/17(See NP,Randy outreach/medication adjustments, 9/11/17 and 9/12/17). See Previous NN/Patient Outreach Documentation:  9/5/17, Chart Review. Patient kept F/U appt today, 9/5/17 with Dr Dolores Robertson at 1.3 per Dr Tran Monk, Double coumadin dose today then resume normal schedule recheck 1 week) and has scheduled F/U appt for 1 week, 9/12/17.   See Previous NN/Patient Outreach Documentation:  9/1/17, This writer/NN received message with alert of hospitalization, per chart review, patient seen at [facility]/Cath Lab, 8/31/17, Right heart cath + CardioMEMS  Per Dr Kierra Degroot:  Mrs. Carmen Vail has chronic diastolic HF in the setting of morbid obesity, HTN, CAD, JASEN and advanced CKD. She also has significant interstitial lung disease. She continues to have class 3-4 LARA on continuous Oxygen. Her volume status is uncertain. Cath 2014 demonstrated stable CAD with RCA . EDP was 25, PAD 20. Stress nuclear study 1 year ago was normal. Echo February 2017 demonstrated vigorous LV function.     It may be reasonable to consider right heart cath to better assess hemodynamics. She may be a good candidate for CardioMEMS.     Per documentation, [facility]/Inpatient NN/ Leana Pereira met with patient and provided patient education for Cardiomems monitoring at home. Carmen Durbin patient current understanding of device.  Patient states she understands she lays on a pillow at home to send data to Dr Santa Vu to help manage her fluid status. Per Dr Santa Vu:  Resume coumadin from this evening 3mg a day  Please come to [facility] tomorrow afternoon to have your sutures removed  Please have nurses draw blood to check on kidney function, copy of results to Dr Jodie Schirmer  Please get INR check done on Tuesday. Recommended to F/U with Cardiology in 1 week and has F/U appt with Dr Nino Duffy, 9/12/17.  9/1/17, Chart Review, patient continues to keep F/U appts with Dr Nino Duffy, seen recently, 8/15/17 and Cardiology/Dr Santa Vu, 8/18/17.  9/1/17, This writer/NN attempted to contact patient at listed phone number, left voicemail message with IFP/NN contact information and request for return call. See Previous NN/Patient Outreach Documentation:  6/7/17, Chart Review. No further ED/Hospitalizations noted at this time. Patient continues to keep regular F/U appts with Dr Nino Duffy. Patient seen today, 6/7/17. Dr Nino Duffy agrees to notify this writer/NN of further NN needs/need for further assistance. See Previous NN/Patient Outreach Documentation:  4/14/17, This writer/NN and [facility] access, able to confirm,patient hospitalized, 4/2/17 - 4/11/17 at [facility] related to Chest Pain and known Interstitial Lung Disease.  Printed Discharge Summary for Dr Marisabel Duque to review. Per Discharge Summary:  Cardiology Work-up, Serial Cardiac Enzymes, ECHO(EF 14-35%, Grade I Diastolic Dysfunction), and negative stress test, cleared for discharge. PULM, continue outpatient F/U, ICS(Dulera 2 puffs, twice daily) and home medications. 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 H/O DVT/Anticoagulation, Discharged on Coumadin, INR at discharge 2.3  Urology, CT complex renal cyst and renal ultrasound with bilateral complex cyst.  Patient also receiving Ergocalciferol(Tuesday and Thursday). Patient instructed to F/U with PCP for INR, CBC and renal profile. Also to have PULM and Renal F/U appts. Patient instructed to follow 1.5 L/day fluid restriction. Patient S/P 2/28/17 - 3/10/17 at [facility] related to Pneumonia/COPD Exacerbation. Patient kept F/U appt with Dr Marisabel Duque, 3/15/17 and Dr Angélica Calzada, 3/22/17. Also kept scheduled appt with Dr Marisabel Duque, 3/29/17 at 2:30pm  4/4/17, Per chart review/this writer/NN and [facility] access, patient has required further hospitalization, admitted, 4/2/17 to [facility] related to chest pain and severe Hypokalemia(K+ at 2.9). Aso of note, chronic kidney disease(BUN/Cr at 80/4.7), nearing dialysis, Renal Consult. See Previous NN/Patient Outreach Documentation:   S/P [facility], 2/16/17 - 2/20/17 related to CHF Exacerbation. Patient kept F/U appt with Dr Marisabel Duque, 2/23/17, next scheduled appt, 3/9/17, to see PULM, 2/28/17. Patient kept PULM F/U appt, 2/28/17 and is being sent back to ED/further medical evaluation. S/P [facility], 2/2/17 - 2/10/17 related to Resp. Failure/Interstitial Lung Disease, DVT, Diastolic HF, PULM HTN and CKD. 2/16/17, Patient in office to establish PCP with Dr Marisabel Duque, reports recent [facility] hospitalization and ED visit. Dr Marisabel Duque contacted this writer/NN with concerns of 30 pound weight gain since discharge, INR at 5. 4(recent DVT on coumadin), Diabetes, CHF,and CKD, recommends patient return to [facility] for further medical evaluation

## 2017-09-22 ENCOUNTER — TELEPHONE (OUTPATIENT)
Dept: CARDIOLOGY CLINIC | Age: 62
End: 2017-09-22

## 2017-09-22 NOTE — TELEPHONE ENCOUNTER
Left a voicemail for Ms. Madelyn Foster. CardioMEMS reading today indicates an increase in PAd pressures up to 20, compared to 12 yesterday.      Trend prior to that:   9/18/17 - 14 9/19/17 - 14 9/20/17 - 15

## 2017-09-22 NOTE — TELEPHONE ENCOUNTER
Spoke with Ms. Earnestine Olea who is doing well. No current complaints. I asked that she re-measure PAd pressure - reading now 14. Will continue current diuretic regimen and monitor.

## 2017-09-26 ENCOUNTER — TELEPHONE (OUTPATIENT)
Dept: CARDIOLOGY CLINIC | Age: 62
End: 2017-09-26

## 2017-09-26 NOTE — TELEPHONE ENCOUNTER
Notified of CardioMEMS PAd pressure today 9/26/17 of 18. Trend reviewed from last week- 12-14 until PAd reading of 16 on 9/24/17. Inconclusive reading on 9/25 and then increased up to 18 today. I have advised her to increase dose of Bumex to 4 mg tonight. Will re-evaluate measurement again tomorrow and follow up if further diuretic adjustment is needed. She voices understanding of the plan.

## 2017-09-27 NOTE — TELEPHONE ENCOUNTER
Confirmed that patient took 4mg of Bumex last night. CardioMEMS PAD pressure today 9/27/17 of 22. Per Birtha Query, NP, will have patient take another Bumex 4mg tonight and Metolazone 5mg 30 minutes prior to taking Bumex. She voices understanding.

## 2017-09-28 NOTE — TELEPHONE ENCOUNTER
Confirmed that patient took Bumex 4mg last night and Metolazone 5mg 30 minutes prior. CardioMEMS PAD pressure today 9/28/17 of 20. Per Ramírez Cruz, NP will have patient take Bumex 4mg tonight and tomorrow morning. May consider Metolazone if she is not back at baseline. She voices understanding.

## 2017-09-29 NOTE — TELEPHONE ENCOUNTER
Confirmed that patient took Bumex 4mg last night and this morning. CardioMEMS PAD pressure today 9/29/17 of 15. Per Christy Tovar, NP will resume normal dosing of bumex 2mg BID. She voices understanding.

## 2017-10-02 ENCOUNTER — NURSE NAVIGATOR (OUTPATIENT)
Dept: CASE MANAGEMENT | Age: 62
End: 2017-10-02

## 2017-10-06 ENCOUNTER — TELEPHONE (OUTPATIENT)
Dept: CARDIOLOGY CLINIC | Age: 62
End: 2017-10-06

## 2017-10-06 NOTE — TELEPHONE ENCOUNTER
Notified Ms. Gonsales that her CardioMEMS PAd pressure today read 9. Trending around 12 for the past several days. She notes feeling dehydrated and heart is beating \"hard in my chest\". She denies any lightheadedness or dizziness. Advised her to hold Bumex now and all day Saturday. Will resume regular dosing on Sunday. Continue to measure pressure daily through weekend and we will phone follow up again on Monday. She voices understanding of the plan.

## 2017-10-09 ENCOUNTER — NURSE NAVIGATOR (OUTPATIENT)
Dept: CASE MANAGEMENT | Age: 62
End: 2017-10-09

## 2017-10-09 ENCOUNTER — TELEPHONE (OUTPATIENT)
Dept: CARDIOLOGY CLINIC | Age: 62
End: 2017-10-09

## 2017-10-09 NOTE — TELEPHONE ENCOUNTER
Spoke with Ms. Lopezclaudemercedes Brooks. She reports that her dizziness improved, but did not fully resolve over the weekend, after holding Bumex for 3 doses. She resumed, as I had instructed on Sunday, and noted that today she began to notice worsening dizziness again (which correlates with her PAd pressures of 9 on Friday and 7 today). She notes that her blood pressure remains stable in the 140's today. She has purchased some fluids and has been hydrating \"all day\". She did take her morning and evening dose of Bumex today. At this time, I have advised her to hold Bumex all day on 10/10 and again the morning of 10/11, until we speak the afternoon of 10/11. She will continue to her CardioMEMS measurements, typically mid day. Advised her to call me with any questions or concerns, prior to our phone follow upon Wednesday.

## 2017-10-10 ENCOUNTER — OFFICE VISIT (OUTPATIENT)
Dept: FAMILY MEDICINE CLINIC | Age: 62
End: 2017-10-10

## 2017-10-10 ENCOUNTER — HOSPITAL ENCOUNTER (OUTPATIENT)
Dept: LAB | Age: 62
Discharge: HOME | End: 2017-10-10
Payer: MEDICARE

## 2017-10-10 VITALS
SYSTOLIC BLOOD PRESSURE: 126 MMHG | OXYGEN SATURATION: 94 % | DIASTOLIC BLOOD PRESSURE: 60 MMHG | RESPIRATION RATE: 20 BRPM | TEMPERATURE: 97.8 F | BODY MASS INDEX: 41.95 KG/M2 | WEIGHT: 293 LBS | HEART RATE: 99 BPM | HEIGHT: 70 IN

## 2017-10-10 DIAGNOSIS — M10.379 ACUTE GOUT DUE TO RENAL IMPAIRMENT INVOLVING FOOT, UNSPECIFIED LATERALITY: ICD-10-CM

## 2017-10-10 DIAGNOSIS — M79.605 BILATERAL LEG PAIN: Primary | ICD-10-CM

## 2017-10-10 DIAGNOSIS — M79.604 BILATERAL LEG PAIN: Primary | ICD-10-CM

## 2017-10-10 DIAGNOSIS — M79.672 LEFT FOOT PAIN: ICD-10-CM

## 2017-10-10 DIAGNOSIS — M10.39 GOUT OF MULTIPLE SITES DUE TO RENAL IMPAIRMENT, UNSPECIFIED CHRONICITY: ICD-10-CM

## 2017-10-10 DIAGNOSIS — I82.4Y9 DEEP VEIN THROMBOSIS (DVT) OF PROXIMAL LOWER EXTREMITY, UNSPECIFIED CHRONICITY, UNSPECIFIED LATERALITY (HCC): ICD-10-CM

## 2017-10-10 PROCEDURE — 84550 ASSAY OF BLOOD/URIC ACID: CPT

## 2017-10-10 RX ORDER — OXYCODONE AND ACETAMINOPHEN 5; 325 MG/1; MG/1
TABLET ORAL
Qty: 20 TAB | Refills: 0 | Status: SHIPPED | OUTPATIENT
Start: 2017-10-10 | End: 2017-10-31 | Stop reason: SDUPTHER

## 2017-10-10 RX ORDER — WARFARIN 2 MG/1
TABLET ORAL
Qty: 30 TAB | Refills: 5
Start: 2017-10-10 | End: 2017-10-25

## 2017-10-10 NOTE — PROGRESS NOTES
Pt here for f/u PT INR   States weight has been up down 7-8 lbs past week   Would like to discuss pain medication dosage

## 2017-10-10 NOTE — PROGRESS NOTES
Shayne Cortés is a 63 y.o. female   Chief Complaint   Patient presents with    Medication Evaluation    Coagulation disorder    pt her for INR and currently 3.9, pt will skip today and tomorrow. Pt states she has been eating less than normal and this is likely why her number has climbed. Pt also reports she is having a lot of pain in her tibial region and also having pain in L foot and will follow up with podiatry. Pt also with b/l leg pain for past couple weeks and states it feels like a pain in the bone and denies any recent trauma. Pt requesting refill of her [pain medication for her gout pain,  checked last fill 9/20/17. Pain is a 7-8/10 and with percocet brings it to a 3-4/10  she is a 63y.o. year old female who presents for evalution. Reviewed PmHx, RxHx, FmHx, SocHx, AllgHx and updated and dated in the chart. Review of Systems - negative except as listed above in the HPI    Objective:     Vitals:    10/10/17 1345   BP: 126/60   Pulse: 99   Resp: 20   Temp: 97.8 °F (36.6 °C)   TempSrc: Oral   SpO2: 94%   Weight: 330 lb (149.7 kg)   Height: 5' 10\" (1.778 m)       Current Outpatient Prescriptions   Medication Sig    warfarin (COUMADIN) 2 mg tablet 1.5 tabs PO daily except Monday tuesda Friday take 2mg(1 tab)    oxyCODONE-acetaminophen (PERCOCET) 5-325 mg per tablet TAKE 1 TABLET BY MOUTH EVERY 6 HOURS AS NEEDED FOR PAIN, MAX DAILY AMOUNT OF 4 TABLETS    atorvastatin (LIPITOR) 80 mg tablet Take 80 mg by mouth every evening.  allopurinol (ZYLOPRIM) 100 mg tablet Take 1 Tab by mouth daily.  colchicine (COLCRYS) 0.6 mg tablet 1.2mg Po at first sign of flare then 0.6mg 1 hour later, do not repeat markos frequently than Q2 weeks    pantoprazole (PROTONIX) 40 mg tablet TAKE 1 TABLET BY MOUTH EVERY DAY FOR HEARTBURN    cetirizine (ZYRTEC) 5 mg tablet Take 1 Tab by mouth daily.     hydrOXYzine pamoate (VISTARIL) 25 mg capsule Take 1 Cap by mouth three (3) times daily as needed for Anxiety.  imipramine (TOFRANIL) 25 mg tablet Take 1 Tab by mouth nightly. For esophageal spasm (Patient taking differently: Take 25 mg by mouth as needed. For esophageal spasm)    albuterol (PROVENTIL VENTOLIN) 2.5 mg /3 mL (0.083 %) nebulizer solution     bumetanide (BUMEX) 1 mg tablet 2 tab PO Qam and 1 tab Qafternoon (Patient taking differently: 2 mg two (2) times a day.)    insulin aspart protamine/insulin aspart (NOVOLOG MIX 70-30) 100 unit/mL (70-30) injection by SubCUTAneous route. Uses sliding scale    ondansetron (ZOFRAN ODT) 4 mg disintegrating tablet Take 1 Tab by mouth every six (6) hours as needed for Nausea. (Patient taking differently: Take 4 mg by mouth as needed for Nausea.)    fluticasone (FLONASE) 50 mcg/actuation nasal spray 2 Sprays by Both Nostrils route nightly as needed.  calcitRIOL (ROCALTROL) 0.25 mcg capsule Take 0.25 mcg by mouth four (4) days a week. Patient takes on Monday and Thursday     isosorbide mononitrate ER (IMDUR) 120 mg CR tablet TAKE 1 TABLET BY MOUTH EVERY DAY    carvedilol (COREG) 25 mg tablet TAKE 1 TABLET BY MOUTH TWICE A DAY    nitroglycerin (NITROSTAT) 0.3 mg SL tablet 1 Tab by SubLINGual route every five (5) minutes as needed for Chest Pain.  ergocalciferol (VITAMIN D2) 50,000 unit capsule Take 50,000 Units by mouth every Tuesday and Thursday.  aspirin 81 mg tablet Take 81 mg by mouth daily. No current facility-administered medications for this visit. Physical Examination: General appearance - alert, well appearing, and in no distress  Eyes - pupils equal and reactive, extraocular eye movements intact  Chest - clear to auscultation, no wheezes, rales or rhonchi, symmetric air entry  Heart - normal rate, regular rhythm, normal S1, S2, no murmurs, rubs, clicks or gallops  Musculoskeletal - b/l ant tibia ttp no obvious deformity no skin lesion      Assessment/ Plan:   Diagnoses and all orders for this visit:    1.  Bilateral leg pain  - XR TIB/FIB LT; Future  -     XR TIB/FIB RT; Future    2. Deep vein thrombosis (DVT) of proximal lower extremity, unspecified chronicity, unspecified laterality (HCC)  -     warfarin (COUMADIN) 2 mg tablet; 1.5 tabs PO daily except Monday tuesda Friday take 2mg(1 tab)  -     oxyCODONE-acetaminophen (PERCOCET) 5-325 mg per tablet; TAKE 1 TABLET BY MOUTH EVERY 6 HOURS AS NEEDED FOR PAIN, MAX DAILY AMOUNT OF 4 TABLETS    3. Left foot pain  -     REFERRAL TO PODIATRY    4. Gout of multiple sites due to renal impairment, unspecified chronicity  -     URIC ACID    5. Acute gout due to renal impairment involving foot, unspecified laterality  -     oxyCODONE-acetaminophen (PERCOCET) 5-325 mg per tablet; TAKE 1 TABLET BY MOUTH EVERY 6 HOURS AS NEEDED FOR PAIN, MAX DAILY AMOUNT OF 4 TABLETS       Follow-up Disposition:  Return if symptoms worsen or fail to improve. I have discussed the diagnosis with the patient and the intended plan as seen in the above orders. The patient has received an after-visit summary and questions were answered concerning future plans. Pt conveyed understanding of plan.     Medication Side Effects and Warnings were discussed with patient      [address] [facility] [address], DO

## 2017-10-11 ENCOUNTER — NURSE NAVIGATOR (OUTPATIENT)
Dept: CASE MANAGEMENT | Age: 62
End: 2017-10-11

## 2017-10-11 DIAGNOSIS — M10.379 ACUTE GOUT DUE TO RENAL IMPAIRMENT INVOLVING FOOT, UNSPECIFIED LATERALITY: ICD-10-CM

## 2017-10-11 LAB — URATE SERPL-MCNC: 8.1 MG/DL (ref 2.5–7.1)

## 2017-10-11 RX ORDER — ALLOPURINOL 100 MG/1
200 TABLET ORAL DAILY
Qty: 60 TAB | Refills: 5 | Status: SHIPPED | OUTPATIENT
Start: 2017-10-11 | End: 2017-11-21

## 2017-10-11 NOTE — PROGRESS NOTES
Your uric acid has come down quite bit but we want it to come down further. I want you to start taking 200mg of allopurinol a day and we will recheck in 4-6 weeks. I sent in an updated Rx just now for you.

## 2017-10-11 NOTE — NURSE NAVIGATOR
Ms. Roman Paez' CARDIOMEMS reading this morning shows pulmonary artery diastolic pressure 8, below the prescribed threshold. Notification sent via [place] Text to [facility] team (Dr. Martin Jolly, Ayush Menendez NP).

## 2017-10-12 ENCOUNTER — TELEPHONE (OUTPATIENT)
Dept: CARDIOLOGY CLINIC | Age: 62
End: 2017-10-12

## 2017-10-12 NOTE — TELEPHONE ENCOUNTER
Patient states she took Bumex 1mg last night. Dose held this morning. CardioMEMS PAD pressure today 10/12/17 of 11.     Per [facility], NP will take another Bumex 2mg this evening. Await instructions tomorrow prior to taking another dose.      She voices understanding.

## 2017-10-13 RX ORDER — BUMETANIDE 2 MG/1
2 TABLET ORAL 2 TIMES DAILY
COMMUNITY
End: 2017-11-10 | Stop reason: DRUGHIGH

## 2017-10-13 NOTE — TELEPHONE ENCOUNTER
CardioMEMS PAD pressure today 10/13/17 of 13. Per Park Yanez, NP will take another bumex 2mg 1 tablet in the evening today and Saturday. Take bumex 2mg 1 tablet BID on Sunday. Follow-up on Monday.

## 2017-10-16 ENCOUNTER — TELEPHONE (OUTPATIENT)
Dept: CARDIOLOGY CLINIC | Age: 62
End: 2017-10-16

## 2017-10-16 ENCOUNTER — NURSE NAVIGATOR (OUTPATIENT)
Dept: CASE MANAGEMENT | Age: 62
End: 2017-10-16

## 2017-10-16 NOTE — NURSE NAVIGATOR
CARDIOMEMS readings from last few days:          Dr. Kenzie Daigle, Park Yanez, NP, Mk Buchanan, and HF NNs notified via tiger text of readings.

## 2017-10-16 NOTE — TELEPHONE ENCOUNTER
Patient said he's supposed to be going out of town but he wants to know if its okay, if his pillow will still register.

## 2017-10-17 NOTE — TELEPHONE ENCOUNTER
Reviewed CardioMEMS trend over weekend with Ms. Alvarez Never. Friday 13, Saturday 15, Sunday 13, and Monday (today) 14. Diuretics received: Friday 2 mg PM only, Saturday 2 mg PM only. Sunday 2 mg BID and today thus far 2 mg AM.     Ms. Alvarez Never reports that her weekend went well. No significant change in how she is feeling. Plan to continue current regimen Bumex 2 mg BID. Will continue to follow trend. Side discussion re: upcoming travel this weekend. She will measure on Friday morning, prior to departure. Will resume measurement again on Monday morning, when she returns.

## 2017-10-18 RX ORDER — PANTOPRAZOLE SODIUM 40 MG/1
TABLET, DELAYED RELEASE ORAL
Qty: 90 TAB | Refills: 2 | Status: SHIPPED | OUTPATIENT
Start: 2017-10-18 | End: 2018-09-11 | Stop reason: SDUPTHER

## 2017-10-18 NOTE — TELEPHONE ENCOUNTER
Requested Prescriptions     Pending Prescriptions Disp Refills    pantoprazole (PROTONIX) 40 mg tablet [Pharmacy Med Name: PANTOPRAZOLE SOD DR 40 MG TAB] 90 Tab 2     Sig: TAKE 1 TABLET BY MOUTH EVERY DAY FOR HEARTBURN       Last Refill: 6-10-17  Last visit: 1-23-17

## 2017-10-19 ENCOUNTER — TELEPHONE (OUTPATIENT)
Dept: CARDIOLOGY CLINIC | Age: 62
End: 2017-10-19

## 2017-10-19 RX ORDER — ISOSORBIDE MONONITRATE 120 MG/1
TABLET, EXTENDED RELEASE ORAL
Qty: 30 TAB | Refills: 11 | Status: SHIPPED | OUTPATIENT
Start: 2017-10-19 | End: 2017-10-19 | Stop reason: SDUPTHER

## 2017-10-19 RX ORDER — ISOSORBIDE MONONITRATE 120 MG/1
TABLET, EXTENDED RELEASE ORAL
Qty: 90 TAB | Refills: 3 | Status: SHIPPED | OUTPATIENT
Start: 2017-10-19 | End: 2018-12-23 | Stop reason: SDUPTHER

## 2017-10-19 NOTE — TELEPHONE ENCOUNTER
Confirmed that patient has been taking Bumex 2mg BID. CardioMEMS PAD pressure today 10/19/17 of 10. Per Ayush Menendez, NP will have patient hold tonight's dose. Reduce bumex to 1mg BID starting tomorrow morning. She voices understanding.

## 2017-10-20 ENCOUNTER — NURSE NAVIGATOR (OUTPATIENT)
Dept: CASE MANAGEMENT | Age: 62
End: 2017-10-20

## 2017-10-23 ENCOUNTER — TELEPHONE (OUTPATIENT)
Dept: CARDIOLOGY CLINIC | Age: 62
End: 2017-10-23

## 2017-10-23 NOTE — TELEPHONE ENCOUNTER
Patient is still in [place]. She states she feels good on current bumex dose. Will continue this evenings dose and follow-up tomorrow.

## 2017-10-25 ENCOUNTER — OFFICE VISIT (OUTPATIENT)
Dept: FAMILY MEDICINE CLINIC | Age: 62
End: 2017-10-25

## 2017-10-25 ENCOUNTER — TELEPHONE (OUTPATIENT)
Dept: CARDIOLOGY CLINIC | Age: 62
End: 2017-10-25

## 2017-10-25 VITALS
HEART RATE: 75 BPM | RESPIRATION RATE: 18 BRPM | TEMPERATURE: 98.1 F | BODY MASS INDEX: 41.95 KG/M2 | DIASTOLIC BLOOD PRESSURE: 68 MMHG | OXYGEN SATURATION: 97 % | HEIGHT: 70 IN | SYSTOLIC BLOOD PRESSURE: 142 MMHG | WEIGHT: 293 LBS

## 2017-10-25 DIAGNOSIS — I82.5Z1 CHRONIC DEEP VEIN THROMBOSIS (DVT) OF DISTAL VEIN OF RIGHT LOWER EXTREMITY (HCC): Primary | ICD-10-CM

## 2017-10-25 NOTE — TELEPHONE ENCOUNTER
Bandar Yan, CABRERA Cobb, LINDSAY                   PAd today was 10. Please have her hold Bumex 1 mg tonight. Assume she has taken 1 mg already this morning. Please ask her to measure CardioMems tomorrow morning and wait to hear from us before she takes her morning dose tomorrow. Patient notified. She voices understanding.

## 2017-10-25 NOTE — PROGRESS NOTES
Cecilia Degroot is a 63 y.o. female   Chief Complaint   Patient presents with    Abnormal Lab Results    pt here for coumadin recheck and is currentl;y at 5. Pt has noticed easier bruising but is not having any bleeding otherwise. Pt is now willing to switch out to eliquis. Will have her hold coumadin and eliquis for 5 days then start eliquis 10/31. she is a 63y.o. year old female who presents for evalution. Reviewed PmHx, RxHx, FmHx, SocHx, AllgHx and updated and dated in the chart. Review of Systems - negative except as listed above in the HPI    Objective:     Vitals:    10/25/17 1343   BP: 142/68   Pulse: 75   Resp: 18   Temp: 98.1 °F (36.7 °C)   TempSrc: Oral   SpO2: 97%   Weight: 334 lb (151.5 kg)   Height: 5' 10\" (1.778 m)       Current Outpatient Prescriptions   Medication Sig    apixaban (ELIQUIS) 2.5 mg tablet Take 1 Tab by mouth two (2) times a day.  isosorbide mononitrate ER (IMDUR) 120 mg CR tablet TAKE 1 TABLET BY MOUTH EVERY DAY    pantoprazole (PROTONIX) 40 mg tablet TAKE 1 TABLET BY MOUTH EVERY DAY FOR HEARTBURN    bumetanide (BUMEX) 2 mg tablet Take 2 mg by mouth two (2) times a day.  allopurinol (ZYLOPRIM) 100 mg tablet Take 2 Tabs by mouth daily.  oxyCODONE-acetaminophen (PERCOCET) 5-325 mg per tablet TAKE 1 TABLET BY MOUTH EVERY 6 HOURS AS NEEDED FOR PAIN, MAX DAILY AMOUNT OF 4 TABLETS    colchicine (COLCRYS) 0.6 mg tablet 1.2mg Po at first sign of flare then 0.6mg 1 hour later, do not repeat markos frequently than Q2 weeks    cetirizine (ZYRTEC) 5 mg tablet Take 1 Tab by mouth daily.  hydrOXYzine pamoate (VISTARIL) 25 mg capsule Take 1 Cap by mouth three (3) times daily as needed for Anxiety.  imipramine (TOFRANIL) 25 mg tablet Take 1 Tab by mouth nightly. For esophageal spasm (Patient taking differently: Take 25 mg by mouth as needed.  For esophageal spasm)    albuterol (PROVENTIL VENTOLIN) 2.5 mg /3 mL (0.083 %) nebulizer solution     insulin aspart protamine/insulin aspart (NOVOLOG MIX 70-30) 100 unit/mL (70-30) injection by SubCUTAneous route. Uses sliding scale    ondansetron (ZOFRAN ODT) 4 mg disintegrating tablet Take 1 Tab by mouth every six (6) hours as needed for Nausea. (Patient taking differently: Take 4 mg by mouth as needed for Nausea.)    fluticasone (FLONASE) 50 mcg/actuation nasal spray 2 Sprays by Both Nostrils route nightly as needed.  calcitRIOL (ROCALTROL) 0.25 mcg capsule Take 0.25 mcg by mouth four (4) days a week. Patient takes on Monday and Thursday     carvedilol (COREG) 25 mg tablet TAKE 1 TABLET BY MOUTH TWICE A DAY    nitroglycerin (NITROSTAT) 0.3 mg SL tablet 1 Tab by SubLINGual route every five (5) minutes as needed for Chest Pain.  ergocalciferol (VITAMIN D2) 50,000 unit capsule Take 50,000 Units by mouth every Tuesday and Thursday.  aspirin 81 mg tablet Take 81 mg by mouth daily.  atorvastatin (LIPITOR) 80 mg tablet Take 80 mg by mouth every evening. No current facility-administered medications for this visit. Physical Examination: General appearance - alert, well appearing, and in no distress  Eyes - pupils equal and reactive, extraocular eye movements intact  Chest - clear to auscultation, no wheezes, rales or rhonchi, symmetric air entry  Heart - normal rate, regular rhythm, normal S1, S2, no murmurs, rubs, clicks or gallops      Assessment/ Plan:   Diagnoses and all orders for this visit:    1. Chronic deep vein thrombosis (DVT) of distal vein of right lower extremity (HCC)  -     apixaban (ELIQUIS) 2.5 mg tablet; Take 1 Tab by mouth two (2) times a day. stop coumadin after today(already took today's dose) start eliquis Tuesday 10/31  Follow-up Disposition:  Return in about 6 weeks (around 12/6/2017), or if symptoms worsen or fail to improve. I have discussed the diagnosis with the patient and the intended plan as seen in the above orders.   The patient has received an after-visit summary and questions were answered concerning future plans. Pt conveyed understanding of plan.     Medication Side Effects and Warnings were discussed with patient      [address] [facility] [address], DO

## 2017-10-26 ENCOUNTER — TELEPHONE (OUTPATIENT)
Dept: CARDIOLOGY CLINIC | Age: 62
End: 2017-10-26

## 2017-10-26 NOTE — TELEPHONE ENCOUNTER
Confirmed that patient held last night and this mornings dose of bumex 1mg. CardioMEMs PAD pressure of 10 10/16/17. Per Ky Pfeiffer NP, will have patient hold tonights and tomorrow mornings dose. She voices understanding.

## 2017-10-27 NOTE — TELEPHONE ENCOUNTER
CardioMEMS PAD pressure of 12 10/27/17. Per Partha Juarez NP, will have patient take bumex 1mg in the evening tonight, Saturday and Sunday. She voices understanding.

## 2017-10-31 DIAGNOSIS — I82.4Y9 DEEP VEIN THROMBOSIS (DVT) OF PROXIMAL LOWER EXTREMITY, UNSPECIFIED CHRONICITY, UNSPECIFIED LATERALITY (HCC): ICD-10-CM

## 2017-10-31 DIAGNOSIS — M10.379 ACUTE GOUT DUE TO RENAL IMPAIRMENT INVOLVING FOOT, UNSPECIFIED LATERALITY: ICD-10-CM

## 2017-10-31 RX ORDER — OXYCODONE AND ACETAMINOPHEN 5; 325 MG/1; MG/1
TABLET ORAL
Qty: 20 TAB | Refills: 0 | Status: SHIPPED | OUTPATIENT
Start: 2017-10-31 | End: 2017-11-21 | Stop reason: SDUPTHER

## 2017-11-01 ENCOUNTER — NURSE NAVIGATOR (OUTPATIENT)
Dept: CASE MANAGEMENT | Age: 62
End: 2017-11-01

## 2017-11-01 ENCOUNTER — DOCUMENTATION ONLY (OUTPATIENT)
Dept: FAMILY MEDICINE CLINIC | Age: 62
End: 2017-11-01

## 2017-11-01 NOTE — PROGRESS NOTES
Pt notified per Dr. Rikki Garcia to resume coumadin(dbl dosage today, and recheck in 1 week) unless she can get clearance from nephrologist to start xarelto. Pt states she will contact nephro today and update office.

## 2017-11-02 RX ORDER — CARVEDILOL 25 MG/1
TABLET ORAL
Qty: 180 TAB | Refills: 3 | Status: SHIPPED | OUTPATIENT
Start: 2017-11-02 | End: 2018-09-21 | Stop reason: SDUPTHER

## 2017-11-03 ENCOUNTER — TELEPHONE (OUTPATIENT)
Dept: FAMILY MEDICINE CLINIC | Age: 62
End: 2017-11-03

## 2017-11-03 ENCOUNTER — TELEPHONE (OUTPATIENT)
Dept: CARDIOLOGY CLINIC | Age: 62
End: 2017-11-03

## 2017-11-03 NOTE — TELEPHONE ENCOUNTER
PAD pressure 6 yesterday 11/2/17. Confirmed that patient held her dose yesterday evening. PAD pressure 12 today 11/3/17. Per Sheldon Kaur NP will have patient hold tonight's dose and take 1mg Saturday and Sunday. Will follow-up Monday. She voices understanding.

## 2017-11-05 NOTE — TELEPHONE ENCOUNTER
xarelto sent in, if this is not affordable then the only option left is coumadin. Pt could also check with company and see if she would qualify for pt assistance if too expensive.

## 2017-11-06 ENCOUNTER — TELEPHONE (OUTPATIENT)
Dept: CARDIOLOGY CLINIC | Age: 62
End: 2017-11-06

## 2017-11-06 NOTE — TELEPHONE ENCOUNTER
PAD pressure today of 11, 11/6/17. Per Elda Dobson NP, will have patient hold tonight's dose of bumex and measure early tomorrow morning. She voices understanding.

## 2017-11-07 ENCOUNTER — PATIENT OUTREACH (OUTPATIENT)
Dept: FAMILY MEDICINE CLINIC | Age: 62
End: 2017-11-07

## 2017-11-07 NOTE — PROGRESS NOTES
11/7/17, Per chart review, no further ED/Hospitalizations noted at this time. Patient continues to keep F/U appts with Dr Zulay Mendez and Cardiology. Patient also communicates with Cardiology related to CardioMEMS/Pressure readings and medication adjustments. Will close NN/Patient Outreach/Transitions of Care and remain available for further NN/CCM needs. See Previous NN/Patient Outreach Documentation:  9/21/17, Per chart review, patient keeping F/U appts, seen recently by Cardiology/Dr Onofre Kowalski. Per documentation: This is her first office visit following CardioMEMs implantation two weeks ago in the setting of chronic diastolic HF, morbid obesity, HTN, CAD, JASEN, advanced CKD and significant interstitial lung disease. Her PAD has dropped from 20 to 14 with a 16 lbs weight loss. She continues to have class 3+ LARA on continuous oxygen but she has no peripheral edema. Will continue Bumex 2 mg BID for now and keep her PAD range 12-16. PLAN: Continue Transitions of Care/recent CardioMEMS/procedure and importance of F/U appts. Discuss symptoms or concerns, medications/?Coumadin/?INR, ?daily weights, ?restrictions/progress, and F/U appts. Discuss further interest in NN/CCM/Disease Management assistance. Provide instruction, goal work and [employer] as indicated. See Previous NN/Patient Outreach Documentation:  S/P [facility]/Cath Lab, 8/31/17, Right heart cath + CardioMEMS. 9/13/17, Chart review, no further ED/Hospitalizations noted at this time. Patient continues to keep F/U appts, seen by Dr Zulay Mendez, 9/12/17(INR check and is currently at 2.1, recommend continue current dose and recheck in 1 month, scheduled for 10/10/17) and has scheduled F/U appt with Cardiology/Dr Onofre Kowalski, 9/15/17(See NP,Randy outreach/medication adjustments, 9/11/17 and 9/12/17). See Previous NN/Patient Outreach Documentation:  9/5/17, Chart Review.  Patient kept F/U appt today, 9/5/17 with Dr Luis Daniel Mcgee at 1.3 per Dr Zulay Mendez, Double coumadin dose today then resume normal schedule recheck 1 week) and has scheduled F/U appt for 1 week, 9/12/17. See Previous NN/Patient Outreach Documentation:  9/1/17, This writer/NN received message with alert of hospitalization, per chart review, patient seen at [facility]/Cath Lab, 8/31/17, Right heart cath + CardioMEMS  Per Dr Myah Pablo:  Mrs. Garcia has chronic diastolic HF in the setting of morbid obesity, HTN, CAD, JASEN and advanced CKD. She also has significant interstitial lung disease. She continues to have class 3-4 LARA on continuous Oxygen. Her volume status is uncertain. Cath 2014 demonstrated stable CAD with RCA . EDP was 25, PAD 20. Stress nuclear study 1 year ago was normal. Echo February 2017 demonstrated vigorous LV function.     It may be reasonable to consider right heart cath to better assess hemodynamics. She may be a good candidate for CardioMEMS.     Per documentation, [facility]/Inpatient NN/ Satnam Ricks met with patient and provided patient education for Cardiomems monitoring at home. Roseann Hall patient current understanding of device.  Patient states she understands she lays on a pillow at home to send data to Dr Myah Pablo to help manage her fluid status. Per Dr Myah Pablo:  Resume coumadin from this evening 3mg a day  Please come to [facility] tomorrow afternoon to have your sutures removed  Please have nurses draw blood to check on kidney function, copy of results to Dr Pati Goodwin  Please get INR check done on Tuesday. Recommended to F/U with Cardiology in 1 week and has F/U appt with Dr Severino Hancock, 9/12/17.  9/1/17, Chart Review, patient continues to keep F/U appts with Dr Severino Hancock, seen recently, 8/15/17 and Cardiology/Dr Myah Pablo, 8/18/17.  9/1/17, This writer/NN attempted to contact patient at listed phone number, left voicemail message with [facility]/NN contact information and request for return call. See Previous NN/Patient Outreach Documentation:  6/7/17, Chart Review. No further ED/Hospitalizations noted at this time.  Patient continues to keep regular F/U appts with Dr Rikki Garcia. Patient seen today, 6/7/17. Dr Rikki Garcia agrees to notify this writer/NN of further NN needs/need for further assistance. See Previous NN/Patient Outreach Documentation:  4/14/17, This writer/NN and [facility] access, able to confirm,patient hospitalized, 4/2/17 - 4/11/17 at [facility] related to Chest Pain and known Interstitial Lung Disease. Printed Discharge Summary for Dr Rikki Garcia to review. Per Discharge Summary:  Cardiology Work-up, Serial Cardiac Enzymes, ECHO(EF 78-10%, Grade I Diastolic Dysfunction), and negative stress test, cleared for discharge. PULM, continue outpatient F/U, ICS(Dulera 2 puffs, twice daily) and home medications. 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 H/O DVT/Anticoagulation, Discharged on Coumadin, INR at discharge 2.3  Urology, CT complex renal cyst and renal ultrasound with bilateral complex cyst.  Patient also receiving Ergocalciferol(Tuesday and Thursday). Patient instructed to F/U with PCP for INR, CBC and renal profile. Also to have PULM and Renal F/U appts. Patient instructed to follow 1.5 L/day fluid restriction. Patient S/P 2/28/17 - 3/10/17 at [facility] related to Pneumonia/COPD Exacerbation. Patient kept F/U appt with Dr Rikki Garcia, 3/15/17 and Dr Tyra Garcia, 3/22/17. Also kept scheduled appt with Dr Rikki Garcia, 3/29/17 at 2:30pm  4/4/17, Per chart review/this writer/NN and [facility] access, patient has required further hospitalization, admitted, 4/2/17 to [facility] related to chest pain and severe Hypokalemia(K+ at 2.9). Aso of note, chronic kidney disease(BUN/Cr at 80/4.7), nearing dialysis, Renal Consult. See Previous NN/Patient Outreach Documentation:   S/P [facility], 2/16/17 - 2/20/17 related to CHF Exacerbation. Patient kept F/U appt with Dr Rikki Garcia, 2/23/17, next scheduled appt, 3/9/17, to see PULM, 2/28/17.   Patient kept PULM F/U appt, 2/28/17 and is being sent back to ED/further medical evaluation. S/P [facility], 2/2/17 - 2/10/17 related to Resp. Failure/Interstitial Lung Disease, DVT, Diastolic HF, PULM HTN and CKD. 2/16/17, Patient in office to establish PCP with Dr Joseph Shearer, reports recent [facility] hospitalization and ED visit. Dr Joseph Shearer contacted this writer/NN with concerns of 30 pound weight gain since discharge, INR at 5. 4(recent DVT on coumadin), Diabetes, CHF,and CKD, recommends patient return to [facility] for further medical evaluation

## 2017-11-08 ENCOUNTER — TELEPHONE (OUTPATIENT)
Dept: CARDIOLOGY CLINIC | Age: 62
End: 2017-11-08

## 2017-11-08 NOTE — TELEPHONE ENCOUNTER
PAd of 11 today. I have called Ms. Gonsales and left a voicemail to further advise her on diuretic dosing. Last conversation, per my nurse Dilip Blanc, was on Monday. She was advised to hold diuretics at that time. Measurement 11/7 was 12, within normal limits, after holding diuretics. Need to confirm that she has continued to hold. Will determine plan moving forward once I speak to her.

## 2017-11-09 NOTE — TELEPHONE ENCOUNTER
Patient states that she has been taking diuretic since Tuesday. Per Shaheen Benavidez Np Patient is to hold Diuretic for the rest of the day and tomorrow and resume taking 0.5mg on sat. Daily and she is to Follow up with nurse on Monday.

## 2017-11-10 ENCOUNTER — TELEPHONE (OUTPATIENT)
Dept: CARDIOLOGY CLINIC | Age: 62
End: 2017-11-10

## 2017-11-10 RX ORDER — BUMETANIDE 1 MG/1
TABLET ORAL
Qty: 90 TAB | Refills: 1 | Status: SHIPPED | OUTPATIENT
Start: 2017-11-10 | End: 2017-11-21

## 2017-11-10 NOTE — TELEPHONE ENCOUNTER
Our office contacted Ms. Gonsales on 11/9/17 to instruct her to hold Bumex  For 11/10 for PAd reading of 7 today. She had already taken Bumex 1 mg that morning. Refill sent in for Bumex 1 mg tablets at this time in order to then resume dosing of 0.5 mg when readings deemed appropriate. PAd reading today 11/10 is now 6. Will continue to hold Bumex today and through weekend. Will have her measure reading on Monday and then await our further instructions.

## 2017-11-13 NOTE — TELEPHONE ENCOUNTER
PAD pressure:  11/11 8  11/12 12  11/13 12    Per Godfrey Underwood NP, will have patient hold her diuretics for one more day and follow-up tomorrow 11/14/17. She voices understanding.

## 2017-11-14 ENCOUNTER — TELEPHONE (OUTPATIENT)
Dept: CARDIOLOGY CLINIC | Age: 62
End: 2017-11-14

## 2017-11-14 ENCOUNTER — OFFICE VISIT (OUTPATIENT)
Dept: FAMILY MEDICINE CLINIC | Age: 62
End: 2017-11-14

## 2017-11-14 VITALS
RESPIRATION RATE: 18 BRPM | OXYGEN SATURATION: 94 % | WEIGHT: 293 LBS | TEMPERATURE: 97.4 F | SYSTOLIC BLOOD PRESSURE: 134 MMHG | DIASTOLIC BLOOD PRESSURE: 72 MMHG | HEART RATE: 87 BPM | HEIGHT: 70 IN | BODY MASS INDEX: 41.95 KG/M2

## 2017-11-14 DIAGNOSIS — I82.5Z1 CHRONIC DEEP VEIN THROMBOSIS (DVT) OF DISTAL VEIN OF RIGHT LOWER EXTREMITY (HCC): Primary | ICD-10-CM

## 2017-11-14 LAB
INR BLD: 1.6
PT POC: 19.3 SECONDS
VALID INTERNAL CONTROL?: YES

## 2017-11-14 RX ORDER — INSULIN LISPRO 100 [IU]/ML
INJECTION, SUSPENSION SUBCUTANEOUS
Qty: 2 PEN | Refills: 0 | Status: SHIPPED | COMMUNITY
Start: 2017-11-14 | End: 2017-11-21

## 2017-11-14 RX ORDER — WARFARIN 2 MG/1
TABLET ORAL
Qty: 30 TAB | Refills: 0
Start: 2017-11-14 | End: 2017-11-21

## 2017-11-14 NOTE — PROGRESS NOTES
Luis Daniel Landry is a 63 y.o. female No chief complaint on file. pt here for INR check and currently at 1.6. Pt was not able to afford the xarelto or eliquis and is back on coumadin. she is a 63y.o. year old female who presents for evalution. Reviewed PmHx, RxHx, FmHx, SocHx, AllgHx and updated and dated in the chart. Review of Systems - negative except as listed above in the HPI    Objective:     Vitals:    11/14/17 1407   BP: 134/72   Pulse: 87   Resp: 18   Temp: 97.4 °F (36.3 °C)   TempSrc: Oral   SpO2: 94%   Weight: 346 lb (156.9 kg)   Height: 5' 10\" (1.778 m)       Current Outpatient Prescriptions   Medication Sig    warfarin (COUMADIN) 2 mg tablet 3mg on Tuesday wed , thurs, sat Sunday, 2mg Mon, friday    Insulin Lisp & Lisp Prot, Hum, (HUMALOG MIX 75-25 KWIKPEN) 100 unit/mL (75-25) inpn Use per sliding scale    bumetanide (BUMEX) 1 mg tablet Take 1 tablet daily or as directed    allopurinol (ZYLOPRIM) 100 mg tablet Take 2 Tabs by mouth daily.  insulin aspart protamine/insulin aspart (NOVOLOG MIX 70-30) 100 unit/mL (70-30) injection by SubCUTAneous route. Uses sliding scale    fluticasone (FLONASE) 50 mcg/actuation nasal spray 2 Sprays by Both Nostrils route nightly as needed.  ergocalciferol (VITAMIN D2) 50,000 unit capsule Take 50,000 Units by mouth every Tuesday and Thursday.  aspirin 81 mg tablet Take 81 mg by mouth daily.  atorvastatin (LIPITOR) 80 mg tablet Take 80 mg by mouth every evening.       carvedilol (COREG) 25 mg tablet TAKE 1 TABLET BY MOUTH TWICE A DAY    oxyCODONE-acetaminophen (PERCOCET) 5-325 mg per tablet TAKE 1 TABLET BY MOUTH EVERY 6 HOURS AS NEEDED FOR PAIN, MAX DAILY AMOUNT OF 4 TABLETS    isosorbide mononitrate ER (IMDUR) 120 mg CR tablet TAKE 1 TABLET BY MOUTH EVERY DAY    pantoprazole (PROTONIX) 40 mg tablet TAKE 1 TABLET BY MOUTH EVERY DAY FOR HEARTBURN    colchicine (COLCRYS) 0.6 mg tablet 1.2mg Po at first sign of flare then 0.6mg 1 hour later, do not repeat markos frequently than Q2 weeks    cetirizine (ZYRTEC) 5 mg tablet Take 1 Tab by mouth daily.  hydrOXYzine pamoate (VISTARIL) 25 mg capsule Take 1 Cap by mouth three (3) times daily as needed for Anxiety.  imipramine (TOFRANIL) 25 mg tablet Take 1 Tab by mouth nightly. For esophageal spasm (Patient taking differently: Take 25 mg by mouth as needed. For esophageal spasm)    albuterol (PROVENTIL VENTOLIN) 2.5 mg /3 mL (0.083 %) nebulizer solution     ondansetron (ZOFRAN ODT) 4 mg disintegrating tablet Take 1 Tab by mouth every six (6) hours as needed for Nausea. (Patient taking differently: Take 4 mg by mouth as needed for Nausea.)    calcitRIOL (ROCALTROL) 0.25 mcg capsule Take 0.25 mcg by mouth four (4) days a week. Patient takes on Monday and Thursday     nitroglycerin (NITROSTAT) 0.3 mg SL tablet 1 Tab by SubLINGual route every five (5) minutes as needed for Chest Pain. No current facility-administered medications for this visit. Physical Examination: General appearance - alert, well appearing, and in no distress  Chest - clear to auscultation, no wheezes, rales or rhonchi, symmetric air entry  Heart - normal rate, regular rhythm, normal S1, S2, no murmurs, rubs, clicks or gallops      Assessment/ Plan:   Diagnoses and all orders for this visit:    1. Chronic deep vein thrombosis (DVT) of distal vein of right lower extremity (HCC)  -     AMB POC PT/INR  -     warfarin (COUMADIN) 2 mg tablet; 3mg on Tuesday wed , thurs, sat Sunday, 2mg Mon, friday    Other orders  -     Insulin Lisp & Lisp Prot, Hum, (HUMALOG MIX 75-25 KWIKPEN) 100 unit/mL (75-25) inpn; Use per sliding scale     humidifier for nosebleeds  Follow-up Disposition:  Return if symptoms worsen or fail to improve. I have discussed the diagnosis with the patient and the intended plan as seen in the above orders. The patient has received an after-visit summary and questions were answered concerning future plans. Pt conveyed understanding of plan.     Medication Side Effects and Warnings were discussed with patient      Rosanne Godwin, DO

## 2017-11-14 NOTE — TELEPHONE ENCOUNTER
Patient unable to transmit readings today. She states she is very short of breath and feels like she is retaining fluid. Per Cailin Morales NP, will have patient take bumex 1mg this evening. Follow-up tomorrow. She voices understanding.

## 2017-11-15 NOTE — TELEPHONE ENCOUNTER
PAD readings:  11/14 12  11/15 14    Per Cailin Morales NP, will have patient resume bumex at 0.5mg daily. She voices understanding.

## 2017-11-20 ENCOUNTER — HOSPITAL ENCOUNTER (OUTPATIENT)
Dept: LAB | Age: 62
Discharge: HOME | End: 2017-11-20
Payer: MEDICARE

## 2017-11-20 ENCOUNTER — DOCUMENTATION ONLY (OUTPATIENT)
Dept: CARDIOLOGY CLINIC | Age: 62
End: 2017-11-20

## 2017-11-20 ENCOUNTER — OFFICE VISIT (OUTPATIENT)
Dept: CARDIOLOGY CLINIC | Age: 62
End: 2017-11-20

## 2017-11-20 VITALS
RESPIRATION RATE: 20 BRPM | BODY MASS INDEX: 41.95 KG/M2 | SYSTOLIC BLOOD PRESSURE: 168 MMHG | OXYGEN SATURATION: 97 % | WEIGHT: 293 LBS | HEART RATE: 77 BPM | DIASTOLIC BLOOD PRESSURE: 82 MMHG | HEIGHT: 70 IN

## 2017-11-20 DIAGNOSIS — I20.9 ANGINA, CLASS III (HCC): ICD-10-CM

## 2017-11-20 DIAGNOSIS — I27.20 PULMONARY HTN (HCC): Chronic | ICD-10-CM

## 2017-11-20 DIAGNOSIS — Z95.9 S/P LEFT PULMONARY ARTERY PRESSURE SENSOR IMPLANT PLACEMENT: ICD-10-CM

## 2017-11-20 DIAGNOSIS — N18.4 CKD (CHRONIC KIDNEY DISEASE) STAGE 4, GFR 15-29 ML/MIN (HCC): Chronic | ICD-10-CM

## 2017-11-20 DIAGNOSIS — J84.9 ILD (INTERSTITIAL LUNG DISEASE) (HCC): ICD-10-CM

## 2017-11-20 DIAGNOSIS — Z79.01 WARFARIN ANTICOAGULATION: ICD-10-CM

## 2017-11-20 DIAGNOSIS — I25.118 ATHEROSCLEROSIS OF NATIVE CORONARY ARTERY OF NATIVE HEART WITH STABLE ANGINA PECTORIS (HCC): Primary | Chronic | ICD-10-CM

## 2017-11-20 DIAGNOSIS — T50.8X5A ALLERGIC REACTION TO CONTRAST MATERIAL, INITIAL ENCOUNTER: ICD-10-CM

## 2017-11-20 DIAGNOSIS — J44.9 COPD, SEVERE (HCC): ICD-10-CM

## 2017-11-20 DIAGNOSIS — I50.32 CHRONIC DIASTOLIC HEART FAILURE (HCC): Chronic | ICD-10-CM

## 2017-11-20 DIAGNOSIS — N18.4 TYPE 2 DIABETES MELLITUS WITH STAGE 4 CHRONIC KIDNEY DISEASE, WITH LONG-TERM CURRENT USE OF INSULIN (HCC): Chronic | ICD-10-CM

## 2017-11-20 DIAGNOSIS — I82.5Z1 CHRONIC DEEP VEIN THROMBOSIS (DVT) OF DISTAL VEIN OF RIGHT LOWER EXTREMITY (HCC): ICD-10-CM

## 2017-11-20 DIAGNOSIS — I10 ESSENTIAL HYPERTENSION: Chronic | ICD-10-CM

## 2017-11-20 DIAGNOSIS — E66.01 MORBID OBESITY (HCC): Chronic | ICD-10-CM

## 2017-11-20 DIAGNOSIS — Z79.4 TYPE 2 DIABETES MELLITUS WITH STAGE 4 CHRONIC KIDNEY DISEASE, WITH LONG-TERM CURRENT USE OF INSULIN (HCC): Chronic | ICD-10-CM

## 2017-11-20 DIAGNOSIS — E11.22 TYPE 2 DIABETES MELLITUS WITH STAGE 4 CHRONIC KIDNEY DISEASE, WITH LONG-TERM CURRENT USE OF INSULIN (HCC): Chronic | ICD-10-CM

## 2017-11-20 DIAGNOSIS — G47.33 OSA ON CPAP: Chronic | ICD-10-CM

## 2017-11-20 DIAGNOSIS — Z99.89 OSA ON CPAP: Chronic | ICD-10-CM

## 2017-11-20 PROCEDURE — 80048 BASIC METABOLIC PNL TOTAL CA: CPT

## 2017-11-20 PROCEDURE — 36415 COLL VENOUS BLD VENIPUNCTURE: CPT

## 2017-11-20 PROCEDURE — 85027 COMPLETE CBC AUTOMATED: CPT

## 2017-11-20 NOTE — MR AVS SNAPSHOT
Visit Information Date & Time Provider Department Dept. Phone Encounter #  
 11/20/2017  9:20 AM Edilma Botello MD [facility] Tammi Arzola [phone] [id number] Your Appointments 11/28/2017  2:20 PM  
ESTABLISHED PATIENT with Tayozaki Levine DO [address] ([facility] Appt Note: 1 week follow up  
 [address] [address] [phone]  
  
   
 [address] [address] Upcoming Health Maintenance Date Due  
 FOOT EXAM Q1 12/14/1967 MICROALBUMIN Q1 12/14/1967 EYE EXAM RETINAL OR DILATED Q1 12/14/1967 Pneumococcal 19-64 Medium Risk (1 of 1 - PPSV23) 12/14/1976 DTaP/Tdap/Td series (1 - Tdap) 12/14/1978 PAP AKA CERVICAL CYTOLOGY 12/14/1978 BREAST CANCER SCRN MAMMOGRAM 12/14/2007 LIPID PANEL Q1 11/9/2015 Influenza Age 5 to Adult 8/1/2017 HEMOGLOBIN A1C Q6M 9/3/2017 ZOSTER VACCINE AGE 60> 10/14/2017 FOBT Q 1 YEAR AGE 50-75 2/18/2018 MEDICARE YEARLY EXAM 5/18/2018 Allergies as of 11/20/2017  Review Complete On: 11/20/2017 By: Freida Pastrana LPN Severity Noted Reaction Type Reactions Contrast Dye [Iodine] High 02/28/2011   Systemic Anaphylaxis Levaquin [Levofloxacin]  10/13/2013    Nausea and Vomiting Morphine  02/28/2011   Side Effect Hives, Itching Current Immunizations  Never Reviewed No immunizations on file. Not reviewed this visit You Were Diagnosed With   
  
 Codes Comments Atherosclerosis of native coronary artery of native heart with stable angina pectoris ([facility]    -  Primary ICD-10-CM: I25.118 
ICD-9-CM: 414.01, 413.9 Essential hypertension     ICD-10-CM: I10 
ICD-9-CM: 401.9 Chronic diastolic heart failure (HCC)     ICD-10-CM: I50.32 
ICD-9-CM: 428.32   
 JASEN on CPAP     ICD-10-CM: G47.33, Z99.89 ICD-9-CM: 327.23, V46.8 Pulmonary HTN     ICD-10-CM: I27.20 ICD-9-CM: 416.8 Morbid obesity (HCC)     ICD-10-CM: E66.01 
ICD-9-CM: 278.01   
 CKD (chronic kidney disease) stage 4, GFR 15-29 ml/min (HCC)     ICD-10-CM: N18.4 ICD-9-CM: 887. 4 Type 2 diabetes mellitus with stage 4 chronic kidney disease, with long-term current use of insulin ([facility])     ICD-10-CM: E11.22, N18.4, Z79.4 ICD-9-CM: 250.40, 585.4, V58.67   
 COPD, severe ([facility])     ICD-10-CM: J44.9 ICD-9-CM: 381 ILD (interstitial lung disease) ([facility]     ICD-10-CM: J84.9 ICD-9-CM: 000 Warfarin anticoagulation     ICD-10-CM: Z79.01 
ICD-9-CM: V58.61 S/P left pulmonary artery pressure sensor implant placement     ICD-10-CM: Z95.9 ICD-9-CM: V45.89 Chronic deep vein thrombosis (DVT) of distal vein of right lower extremity ([facility])     ICD-10-CM: T17.5B2 ICD-9-CM: 453.52 Angina, class III ([facility]     ICD-10-CM: I20.9 ICD-9-CM: 413.9 Vitals BP Pulse Resp Height(growth percentile) Weight(growth percentile) SpO2  
 168/82 (BP 1 Location: Right arm, BP Patient Position: Sitting) 77 20 5' 10\" (1.778 m) 346 lb (156.9 kg) 97% BMI OB Status Smoking Status 49.65 kg/m2 Hysterectomy Former Smoker Vitals History BMI and BSA Data Body Mass Index Body Surface Area  
 49.65 kg/m 2 2.78 m 2 Preferred Pharmacy Pharmacy Name Phone [facility][id number] [phone] Your Updated Medication List  
  
   
This list is accurate as of: 11/20/17  9:54 AM.  Always use your most recent med list.  
  
  
  
  
 albuterol 2.5 mg /3 mL (0.083 %) nebulizer solution Commonly known as:  PROVENTIL VENTOLIN  
  
 allopurinol 100 mg tablet Commonly known as:  [place] Minor Take 2 Tabs by mouth daily. aspirin 81 mg tablet Take 81 mg by mouth daily. atorvastatin 80 mg tablet Commonly known as:  LIPITOR Take 80 mg by mouth every evening. bumetanide 1 mg tablet Commonly known as:  Elo Valencian Take 1 tablet daily or as directed calcitRIOL 0.25 mcg capsule Commonly known as:  ROCALTROL Take 0.25 mcg by mouth four (4) days a week. Patient takes on Monday and Thursday  
  
 carvedilol 25 mg tablet Commonly known as:  COREG  
TAKE 1 TABLET BY MOUTH TWICE A DAY  
  
 cetirizine 5 mg tablet Commonly known as:  ZYRTEC Take 1 Tab by mouth daily. colchicine 0.6 mg tablet Commonly known as:  COLCRYS  
1.2mg Po at first sign of flare then 0.6mg 1 hour later, do not repeat markos frequently than Q2 weeks FLONASE 50 mcg/actuation nasal spray Generic drug:  fluticasone 2 Sprays by Both Nostrils route nightly as needed. hydrOXYzine pamoate 25 mg capsule Commonly known as:  VISTARIL Take 1 Cap by mouth three (3) times daily as needed for Anxiety. imipramine 25 mg tablet Commonly known as:  TOFRANIL Take 1 Tab by mouth nightly. For esophageal spasm Insulin Lisp & Lisp Prot (Hum) 100 unit/mL (75-25) Inpn Commonly known as:  HumaLOG Mix 75-25 KwikPen Use per sliding scale  
  
 isosorbide mononitrate  mg CR tablet Commonly known as:  IMDUR  
TAKE 1 TABLET BY MOUTH EVERY DAY  
  
 nitroglycerin 0.3 mg SL tablet Commonly known as:  NITROSTAT  
1 Tab by SubLINGual route every five (5) minutes as needed for Chest Pain. NovoLOG Mix 70-30 100 unit/mL (70-30) injection Generic drug:  insulin aspart protamine/insulin aspart  
by SubCUTAneous route. Uses sliding scale  
  
 ondansetron 4 mg disintegrating tablet Commonly known as:  ZOFRAN ODT Take 1 Tab by mouth every six (6) hours as needed for Nausea. oxyCODONE-acetaminophen 5-325 mg per tablet Commonly known as:  PERCOCET TAKE 1 TABLET BY MOUTH EVERY 6 HOURS AS NEEDED FOR PAIN, MAX DAILY AMOUNT OF 4 TABLETS  
  
 pantoprazole 40 mg tablet Commonly known as:  PROTONIX  
TAKE 1 TABLET BY MOUTH EVERY DAY FOR HEARTBURN  
  
 VITAMIN D2 50,000 unit capsule Generic drug:  ergocalciferol Take 50,000 Units by mouth every Tuesday and Thursday. warfarin 2 mg tablet Commonly known as:  COUMADIN  
3mg on Tuesday wed , thurs, sat Sunday, 2mg Mon, friday We Performed the Following CBC W/O DIFF [11082 CPT(R)] METABOLIC PANEL, BASIC [93565 CPT(R)] Patient Instructions No Coumadin day of or day prior to your cath. No Bumex day of your cath. Introducing \[facility] [place] [facility]\"" & HEALTH SERVICES! Dear Khai Multani: Thank you for requesting a [employer] account. Our records indicate that you already have an active [employer] account. You can access your account anytime at [email or web address] Did you know that you can access your hospital and ER discharge instructions at any time in [employer]? You can also review all of your test results from your hospital stay or ER visit. Additional Information If you have questions, please visit the Frequently Asked Questions section of the [employer] website at [email or web address] Remember, [employer] is NOT to be used for urgent needs. For medical emergencies, dial 911. Now available from your iPhone and Android! Please provide this summary of care documentation to your next provider. Your primary care clinician is listed as Emelyn Hernandez. If you have any questions after today's visit, please call [phone].

## 2017-11-20 NOTE — PROGRESS NOTES
Your Cardiac Cath. is scheduled for November 28th, 2017 at 8:45am at [facility]. Please arrive 1 hour early at 7:45am on the second floor at outpatient registration. Have nothing to eat or drink after midnight. Take all medications as prescribed, except HOLD Coumadin 2 days prior and Bumex the morning of the procedure. Please have labs drawn TODAY. You will need to have a  with you.  Please bring an overnight bag with you the day of your procedure in case you are asked to remain at the hospital.

## 2017-11-20 NOTE — PROGRESS NOTES
Riley Cornell is a 63 y.o. female  Chief Complaint   Patient presents with    Coronary Artery Disease    Chest Pain (Angina)    CHF    Hypertension     1. Have you been to the ER, urgent care clinic since your last visit? Hospitalized since your last visit? No    2. Have you seen or consulted any other health care providers outside of the [address] [place] [address] since your last visit? Include any pap smears or colon screening.   No

## 2017-11-20 NOTE — LETTER
11/20/2017 9:50 AM 
 
Ms. Turner Situ [address] [address] Ms. Beka Brooks, Your Cardiac Cath. is scheduled for November 28th, 2017 at 8:45am at [facility]. Please arrive 1 hour early at 7:45am on the second floor at outpatient registration. Have nothing to eat or drink after midnight. Take all medications as prescribed, except HOLD Coumadin 2 days prior and Bumex the morning of the procedure. Please have labs drawn TODAY. You will need to have a  with you. Please bring an overnight bag with you the day of your procedure in case you are asked to remain at the hospital. 
 
 
Sincerely, Genie Mckeon MD

## 2017-11-20 NOTE — PROGRESS NOTES
History of Present Illness    Cecilia Bloomfield is a 63 y.o. female. Last seen 2 months ago.      Problem List  Date Reviewed: 11/14/2017          Codes Class Noted    S/P left pulmonary artery pressure sensor implant placement ICD-10-CM: Z95.9  ICD-9-CM: V45.89  8/31/2017    Overview Signed 8/31/2017  9:53 AM by Mino Ovalle RN     [employer]              ILD (interstitial lung disease) [facility]) ICD-10-CM: J84.9  ICD-9-CM: 001  8/20/2017        Warfarin anticoagulation ICD-10-CM: Z79.01  ICD-9-CM: V58.61  8/20/2017        COPD, severe ([facility] ICD-10-CM: J44.9  ICD-9-CM: 615  6/21/2017        CKD (chronic kidney disease) stage 4, GFR 15-29 ml/min (HCC) (Chronic) ICD-10-CM: N18.4  ICD-9-CM: 585.4  2/10/2017        DVT (deep venous thrombosis) ([facility] ICD-10-CM: I82.409  ICD-9-CM: 453.40  2/2/2017        DM (diabetes mellitus), type 2 with renal complications (HCC) (Chronic) ICD-10-CM: E11.29  ICD-9-CM: 250.40  8/9/2013        Vitamin D deficiency ICD-10-CM: E55.9  ICD-9-CM: 268.9  8/9/2013        Angina, class III ([facility] ICD-10-CM: I20.9  ICD-9-CM: 413.9  8/9/2013        Normocytic anemia (Chronic) ICD-10-CM: D64.9  ICD-9-CM: 285.9  5/26/2013        DJD (degenerative joint disease) of knee ICD-10-CM: M17.10  ICD-9-CM: 715.36  10/30/2012        Chronic diastolic heart failure (HCC) (Chronic) ICD-10-CM: I50.32  ICD-9-CM: 428.32  10/5/2012        HTN (hypertension) (Chronic) ICD-10-CM: I10  ICD-9-CM: 401.9  10/1/2012        Morbid obesity (Chronic) ICD-10-CM: E66.01  ICD-9-CM: 278.01  10/1/2012        Esophageal reflux ICD-10-CM: K21.9  ICD-9-CM: 530.81  10/1/2012        Gastroparesis ICD-10-CM: K31.84  ICD-9-CM: 536.3  10/1/2012        Pulmonary HTN (Chronic) ICD-10-CM: I27.20  ICD-9-CM: 416.8  3/21/2012        CAD (coronary Artery Disease) Native Artery (Chronic) ICD-10-CM: I25.10  ICD-9-CM: 414.01  2/16/2011    Overview Addendum 10/5/2012  7:33 AM by PRASHANT Morgan 2004  1v CAD by cath - PCI OM with SAL  Cath 5/2004 - patent stent, no new disease  Lexiscan cardiolite 12/09- normal perfusion, EF 66%  Cath: 3/19/12: PA 55/30 mean 41, wedge 26, RA 24, EDP 35, LVG 55%, LM normal, LAD normal, LCX - OM1 patent stent, OM2 prox 85% long, % with L to R collaterals                JASEN on CPAP (Chronic) ICD-10-CM: G47.33, Z99.89  ICD-9-CM: 327.23, V46.8  2/16/2011    Overview Signed 3/21/2012  8:04 AM by JUSTICE MitchellP     Dr. Yulissa Marti CPAP                   Cardiac Testing  CATH: 2004: 1v CAD by cath - PCI OM with SAL  CATH 5/2004 - patent stent, no new disease   Lexiscan cardiolite 12/09- normal perfusion, EF 66%  ECHO 11/2009: LVH, EF 29%, diastolic dysfunction  STRESS/LEXISCAN/CARDIOLITE 3/29/11: normal perfusion, EF 62%  CATH: 3/20/2012 :PA 55/30 mean 41, wedge 26, RA 24, EDP 35, LVG 55%, LM normal, LAD normal, LCX - OM1 patent stent, OM2 prox 85% long, % w/ L -> R collateral; s/p SAL-> OM2/OM3 with SAL. CATH: 10/4/2012: EDP 25, EF 55%, LM nl, LAD mild plaque, LCX patent OM stents, % prox with good L to R collaterals. Cath 3/18/2014 - RA 9 PA 42/19/29, PCW 12, EDP 25, no LVG due to CKD, LM nl, LAD mild plaque, LCX patent stents OM1/OM2, RCA chronic proximal occlusion with L to R collaterals. ECHO 4/11/16: EF 60-65%. No WMA. LA mildly dilated. Lexiscan Cardiolite 4/11/16: Normal. LVEF 55%. Compared to 3/29/11, no significant changes. RHC/CardioMEMS implant 8/31/17 - RA 12, PA 56/20/30, CI (Mayte) 2.65      HPI    Patient reports worsening LARA and chest pain walking short distances x 2 weeks. She states \" it feels like my whole chest is going to cave in.\" She has had to take NTG three times this week with relief of chest pain. There has been significant variation in PAD measurements anywhere from 8-16. Last week it was 12-14. PAD today is 9. She also states she has been getting headaches and has had several episodes of epistaxis. Her INR was 1.6 the other day.  She has not seen an ENT.     She has chornic LE edema, R>L but has had the DVT in her right leg too. She also continues to gain weight. Patient denies any palpitations, syncope, orthopnea, or paroxysmal nocturnal dyspnea. She is here with her  today. Current Outpatient Prescriptions on File Prior to Visit   Medication Sig Dispense Refill    warfarin (COUMADIN) 2 mg tablet 3mg on Tuesday wed , thurs, sat Sunday, 2mg Mon, friday (Patient taking differently: 3mg on Tuesday wed , thurs, sat Sunday, friday, 2mg Mon) 30 Tab 0    bumetanide (BUMEX) 1 mg tablet Take 1 tablet daily or as directed 90 Tab 1    carvedilol (COREG) 25 mg tablet TAKE 1 TABLET BY MOUTH TWICE A  Tab 3    oxyCODONE-acetaminophen (PERCOCET) 5-325 mg per tablet TAKE 1 TABLET BY MOUTH EVERY 6 HOURS AS NEEDED FOR PAIN, MAX DAILY AMOUNT OF 4 TABLETS 20 Tab 0    isosorbide mononitrate ER (IMDUR) 120 mg CR tablet TAKE 1 TABLET BY MOUTH EVERY DAY 90 Tab 3    pantoprazole (PROTONIX) 40 mg tablet TAKE 1 TABLET BY MOUTH EVERY DAY FOR HEARTBURN 90 Tab 2    allopurinol (ZYLOPRIM) 100 mg tablet Take 2 Tabs by mouth daily. 60 Tab 5    albuterol (PROVENTIL VENTOLIN) 2.5 mg /3 mL (0.083 %) nebulizer solution       insulin aspart protamine/insulin aspart (NOVOLOG MIX 70-30) 100 unit/mL (70-30) injection by SubCUTAneous route. Uses sliding scale      ondansetron (ZOFRAN ODT) 4 mg disintegrating tablet Take 1 Tab by mouth every six (6) hours as needed for Nausea. (Patient taking differently: Take 4 mg by mouth as needed for Nausea.) 30 Tab 0    fluticasone (FLONASE) 50 mcg/actuation nasal spray 2 Sprays by Both Nostrils route nightly as needed.  calcitRIOL (ROCALTROL) 0.25 mcg capsule Take 0.25 mcg by mouth four (4) days a week. Patient takes on Monday and Thursday       nitroglycerin (NITROSTAT) 0.3 mg SL tablet 1 Tab by SubLINGual route every five (5) minutes as needed for Chest Pain.  1 Bottle 1    ergocalciferol (VITAMIN D2) 50,000 unit capsule Take 50,000 Units by mouth every Tuesday and Thursday.  aspirin 81 mg tablet Take 81 mg by mouth daily.  atorvastatin (LIPITOR) 80 mg tablet Take 80 mg by mouth every evening.  Insulin Lisp & Lisp Prot, Hum, (HUMALOG MIX 75-25 KWIKPEN) 100 unit/mL (75-25) inpn Use per sliding scale 2 Pen 0    colchicine (COLCRYS) 0.6 mg tablet 1.2mg Po at first sign of flare then 0.6mg 1 hour later, do not repeat markos frequently than Q2 weeks 30 Tab 1    cetirizine (ZYRTEC) 5 mg tablet Take 1 Tab by mouth daily. 30 Tab 11    hydrOXYzine pamoate (VISTARIL) 25 mg capsule Take 1 Cap by mouth three (3) times daily as needed for Anxiety. 30 Cap 1    imipramine (TOFRANIL) 25 mg tablet Take 1 Tab by mouth nightly. For esophageal spasm (Patient taking differently: Take 25 mg by mouth as needed. For esophageal spasm) 30 Tab 5     No current facility-administered medications on file prior to visit. Allergies   Allergen Reactions    Contrast Dye [Iodine] Anaphylaxis    Levaquin [Levofloxacin] Nausea and Vomiting    Morphine Hives and Itching     Lives in [place]. Review of Systems  Constitutional: Negative for fever, chills, malaise/fatigue and diaphoresis. +weight gain  HEENT: +epistaxis  Respiratory: Negative for cough, hemoptysis, sputum production, and wheezing. +LARA. Cardiovascular: Negative for palpitations, orthopnea, claudication, and PND. +chest pain, +LE edema R>L  Gastrointestinal: Negative for nausea, vomiting, blood in stool and melena. Genitourinary: Negative for dysuria and flank pain. Musculoskeletal: Negative for joint pain and back pain. Skin: Negative for rash. Neurological: Negative for focal weakness, seizures, loss of consciousness, weakness. +headaches   Endo/Heme/Allergies: Does not bruise/bleed easily. Psychiatric/Behavioral: Negative for memory loss. The patient does not have insomnia.       Visit Vitals    /82 (BP 1 Location: Right arm, BP Patient Position: Sitting)    Pulse 77    Resp 20    Ht 5' 10\" (1.778 m)    Wt 346 lb (156.9 kg)    SpO2 97%    BMI 49.65 kg/m2     Wt Readings from Last 3 Encounters:   11/20/17 346 lb (156.9 kg)   11/14/17 346 lb (156.9 kg)   10/25/17 334 lb (151.5 kg)       Physical Exam  General - well developed well nourished  Neck - JVP normal, thyroid normal  Cardiac - normal S1,S2, no murmurs, rubs or gallops. No clicks  Vascular - carotids without bruits, radials, femorals and pedal pulses equal bilateral  Lungs - clear to auscultation bilaterals, no rales, wheezing or rhonchi  Abd - soft nontender, no HSM, no abd bruits  Extremities - trace ankle edema  Skin - no rash  Neuro - nonfocal  Psych - normal mood and affect    Cardiographics  EKG 2/14 - Normal sinus rhythm, low voltage, otherwise normal EKG  Echo 02/04/17- LVEF 75%     ASSESSMENT and PLAN  Encounter Diagnoses   Name Primary?  Atherosclerosis of native coronary artery of native heart with stable angina pectoris (HCC) Yes    Essential hypertension     Chronic diastolic heart failure (HCC)     JASEN on CPAP     Pulmonary HTN     Morbid obesity     CKD (chronic kidney disease) stage 4, GFR 15-29 ml/min (HCC)     Type 2 diabetes mellitus with stage 4 chronic kidney disease, with long-term current use of insulin (HCC)     COPD, severe (HCC)     [facility]     Warfarin anticoagulation     S/P left pulmonary artery pressure sensor implant placement     Chronic deep vein thrombosis (DVT) of distal vein of right lower extremity (HCC)     Angina, class III ([facility])     Allergic reaction to contrast material, initial encounter      Mrs. Lynnda Holter has multiple chronic issues:    1. Progressive angina x 2 weeks. Cath 2014- patent LCX stents, chronic RCA . Lexciscan Cardiolite 2016- normal. In light of her normal PAD (9) I am concerned about progressive CAD rather than worsening HF. High risk for CAD progression due to DM. Favor cath at this time. She is on maximal medical therapy. The procedure was discussed at length with the patient, including risks/benefits, potential findings and treatment options. .She agrees to proceed. Risk increased by significant CKD. 2. Chronic class 3 dyspnea due to diastolic dysfunction and ILD. PAD has largely been optimized by CardioMEMS surveillance. Continue Bumex 1 mg/d. 3. HTN. Variable control on current treatment. Continue to monitor. 4. Chronic anticoagulation due to right leg DVT. 5. Stage 4 CKD monitored by Dr. Olu Meeks. 6. Morbid obesity    Cath near future- coronaries only, no LVG.    R radial approach  ASA 3  Airway 3    Pre-Rx with iv solucortef 100 mg iv/iv benadryl 50 mg iv on call to procedure  Hold coumadin 2 days prior    Written by Jonn Ann, as dictated by Deepak Thakkar MD.   Deepak Thakkar MD

## 2017-11-21 ENCOUNTER — HOSPITAL ENCOUNTER (INPATIENT)
Age: 62
LOS: 1 days | Discharge: HOME | DRG: 292 | End: 2017-11-22
Attending: EMERGENCY MEDICINE | Admitting: INTERNAL MEDICINE
Payer: MEDICARE

## 2017-11-21 ENCOUNTER — TELEPHONE (OUTPATIENT)
Dept: CARDIOLOGY CLINIC | Age: 62
End: 2017-11-21

## 2017-11-21 DIAGNOSIS — D64.9 SYMPTOMATIC ANEMIA: ICD-10-CM

## 2017-11-21 DIAGNOSIS — I82.4Y9 DEEP VEIN THROMBOSIS (DVT) OF PROXIMAL LOWER EXTREMITY, UNSPECIFIED CHRONICITY, UNSPECIFIED LATERALITY (HCC): ICD-10-CM

## 2017-11-21 DIAGNOSIS — R06.09 EXERTIONAL DYSPNEA: Primary | ICD-10-CM

## 2017-11-21 DIAGNOSIS — N18.9 CHRONIC KIDNEY DISEASE, UNSPECIFIED CKD STAGE: ICD-10-CM

## 2017-11-21 DIAGNOSIS — M10.379 ACUTE GOUT DUE TO RENAL IMPAIRMENT INVOLVING FOOT, UNSPECIFIED LATERALITY: ICD-10-CM

## 2017-11-21 PROBLEM — R07.9 CHEST PAIN: Status: ACTIVE | Noted: 2017-11-21

## 2017-11-21 LAB
ALBUMIN SERPL-MCNC: 3.2 G/DL (ref 3.5–5)
ALBUMIN/GLOB SERPL: 0.9 {RATIO} (ref 1.1–2.2)
ALP SERPL-CCNC: 94 U/L (ref 45–117)
ALT SERPL-CCNC: 19 U/L (ref 12–78)
ANION GAP SERPL CALC-SCNC: 11 MMOL/L (ref 5–15)
ANION GAP SERPL CALC-SCNC: 11 MMOL/L (ref 5–15)
APTT PPP: 29.9 SEC (ref 22.1–32.5)
AST SERPL-CCNC: 24 U/L (ref 15–37)
BASOPHILS # BLD: 0 K/UL (ref 0–0.1)
BASOPHILS # BLD: 0 K/UL (ref 0–0.1)
BASOPHILS NFR BLD: 0 % (ref 0–1)
BASOPHILS NFR BLD: 0 % (ref 0–1)
BILIRUB SERPL-MCNC: 0.5 MG/DL (ref 0.2–1)
BUN SERPL-MCNC: 43 MG/DL (ref 6–24)
BUN SERPL-MCNC: 44 MG/DL (ref 6–20)
BUN SERPL-MCNC: 44 MG/DL (ref 6–20)
BUN/CREAT SERPL: 13 (ref 9–23)
BUN/CREAT SERPL: 14 (ref 12–20)
BUN/CREAT SERPL: 14 (ref 12–20)
CALCIUM SERPL-MCNC: 8.1 MG/DL (ref 8.5–10.1)
CALCIUM SERPL-MCNC: 8.1 MG/DL (ref 8.5–10.1)
CALCIUM SERPL-MCNC: 8.2 MG/DL (ref 8.7–10.2)
CHLORIDE SERPL-SCNC: 106 MMOL/L (ref 96–106)
CHLORIDE SERPL-SCNC: 108 MMOL/L (ref 97–108)
CHLORIDE SERPL-SCNC: 109 MMOL/L (ref 97–108)
CO2 SERPL-SCNC: 22 MMOL/L (ref 18–29)
CO2 SERPL-SCNC: 25 MMOL/L (ref 21–32)
CO2 SERPL-SCNC: 26 MMOL/L (ref 21–32)
CREAT SERPL-MCNC: 3.09 MG/DL (ref 0.55–1.02)
CREAT SERPL-MCNC: 3.21 MG/DL (ref 0.57–1)
CREAT SERPL-MCNC: 3.24 MG/DL (ref 0.55–1.02)
DIFFERENTIAL METHOD BLD: ABNORMAL
EOSINOPHIL # BLD: 0.4 K/UL (ref 0–0.4)
EOSINOPHIL # BLD: 0.4 K/UL (ref 0–0.4)
EOSINOPHIL NFR BLD: 5 % (ref 0–7)
EOSINOPHIL NFR BLD: 5 % (ref 0–7)
ERYTHROCYTE [DISTWIDTH] IN BLOOD BY AUTOMATED COUNT: 14.1 % (ref 11.5–14.5)
ERYTHROCYTE [DISTWIDTH] IN BLOOD BY AUTOMATED COUNT: 14.2 % (ref 11.5–14.5)
ERYTHROCYTE [DISTWIDTH] IN BLOOD BY AUTOMATED COUNT: 14.7 % (ref 12.3–15.4)
GFR SERPLBLD CREATININE-BSD FMLA CKD-EPI: 15 ML/MIN/1.73
GFR SERPLBLD CREATININE-BSD FMLA CKD-EPI: 17 ML/MIN/1.73
GLOBULIN SER CALC-MCNC: 3.6 G/DL (ref 2–4)
GLUCOSE BLD STRIP.AUTO-MCNC: 106 MG/DL (ref 65–100)
GLUCOSE BLD STRIP.AUTO-MCNC: 156 MG/DL (ref 65–100)
GLUCOSE SERPL-MCNC: 111 MG/DL (ref 65–100)
GLUCOSE SERPL-MCNC: 127 MG/DL (ref 65–99)
GLUCOSE SERPL-MCNC: 186 MG/DL (ref 65–100)
HCT VFR BLD AUTO: 21.3 % (ref 35–47)
HCT VFR BLD AUTO: 21.6 % (ref 34–46.6)
HCT VFR BLD AUTO: 23.3 % (ref 35–47)
HGB BLD-MCNC: 6.8 G/DL (ref 11.5–16)
HGB BLD-MCNC: 7.2 G/DL (ref 11.1–15.9)
HGB BLD-MCNC: 7.4 G/DL (ref 11.5–16)
INR PPP: 2.7 (ref 0.9–1.1)
INTERPRETATION: NORMAL
IRON SATN MFR SERPL: 15 % (ref 20–50)
IRON SERPL-MCNC: 37 UG/DL (ref 35–150)
LYMPHOCYTES # BLD: 1.7 K/UL (ref 0.8–3.5)
LYMPHOCYTES # BLD: 2.3 K/UL (ref 0.8–3.5)
LYMPHOCYTES NFR BLD: 22 % (ref 12–49)
LYMPHOCYTES NFR BLD: 30 % (ref 12–49)
Lab: NORMAL
MCH RBC QN AUTO: 30.5 PG (ref 26–34)
MCH RBC QN AUTO: 31 PG (ref 26–34)
MCH RBC QN AUTO: 31.6 PG (ref 26.6–33)
MCHC RBC AUTO-ENTMCNC: 31.8 G/DL (ref 30–36.5)
MCHC RBC AUTO-ENTMCNC: 31.9 G/DL (ref 30–36.5)
MCHC RBC AUTO-ENTMCNC: 33.3 G/DL (ref 31.5–35.7)
MCV RBC AUTO: 95 FL (ref 79–97)
MCV RBC AUTO: 95.5 FL (ref 80–99)
MCV RBC AUTO: 97.5 FL (ref 80–99)
MONOCYTES # BLD: 0.5 K/UL (ref 0–1)
MONOCYTES # BLD: 0.5 K/UL (ref 0–1)
MONOCYTES NFR BLD: 6 % (ref 5–13)
MONOCYTES NFR BLD: 6 % (ref 5–13)
NEUTS SEG # BLD: 4.6 K/UL (ref 1.8–8)
NEUTS SEG # BLD: 5.1 K/UL (ref 1.8–8)
NEUTS SEG NFR BLD: 59 % (ref 32–75)
NEUTS SEG NFR BLD: 67 % (ref 32–75)
PLATELET # BLD AUTO: 147 K/UL (ref 150–400)
PLATELET # BLD AUTO: 212 K/UL (ref 150–400)
PLATELET # BLD AUTO: 220 X10E3/UL (ref 150–379)
POTASSIUM SERPL-SCNC: 3.9 MMOL/L (ref 3.5–5.1)
POTASSIUM SERPL-SCNC: 4.2 MMOL/L (ref 3.5–5.1)
POTASSIUM SERPL-SCNC: 4.3 MMOL/L (ref 3.5–5.2)
PROT SERPL-MCNC: 6.8 G/DL (ref 6.4–8.2)
PROTHROMBIN TIME: 28.4 SEC (ref 9–11.1)
RBC # BLD AUTO: 2.23 M/UL (ref 3.8–5.2)
RBC # BLD AUTO: 2.28 X10E6/UL (ref 3.77–5.28)
RBC # BLD AUTO: 2.39 M/UL (ref 3.8–5.2)
RBC MORPH BLD: ABNORMAL
SERVICE CMNT-IMP: ABNORMAL
SERVICE CMNT-IMP: ABNORMAL
SODIUM SERPL-SCNC: 144 MMOL/L (ref 134–144)
SODIUM SERPL-SCNC: 144 MMOL/L (ref 136–145)
SODIUM SERPL-SCNC: 146 MMOL/L (ref 136–145)
THERAPEUTIC RANGE,PTTT: NORMAL SECS (ref 58–77)
TIBC SERPL-MCNC: 246 UG/DL (ref 250–450)
TROPONIN I SERPL-MCNC: <0.04 NG/ML
TROPONIN I SERPL-MCNC: <0.04 NG/ML
WBC # BLD AUTO: 7.6 K/UL (ref 3.6–11)
WBC # BLD AUTO: 7.8 K/UL (ref 3.6–11)
WBC # BLD AUTO: 7.9 X10E3/UL (ref 3.4–10.8)

## 2017-11-21 PROCEDURE — 85610 PROTHROMBIN TIME: CPT | Performed by: EMERGENCY MEDICINE

## 2017-11-21 PROCEDURE — 83540 ASSAY OF IRON: CPT | Performed by: INTERNAL MEDICINE

## 2017-11-21 PROCEDURE — 36415 COLL VENOUS BLD VENIPUNCTURE: CPT | Performed by: EMERGENCY MEDICINE

## 2017-11-21 PROCEDURE — 86900 BLOOD TYPING SEROLOGIC ABO: CPT | Performed by: EMERGENCY MEDICINE

## 2017-11-21 PROCEDURE — 74011250636 HC RX REV CODE- 250/636: Performed by: INTERNAL MEDICINE

## 2017-11-21 PROCEDURE — 93005 ELECTROCARDIOGRAM TRACING: CPT

## 2017-11-21 PROCEDURE — 82962 GLUCOSE BLOOD TEST: CPT

## 2017-11-21 PROCEDURE — 84484 ASSAY OF TROPONIN QUANT: CPT | Performed by: EMERGENCY MEDICINE

## 2017-11-21 PROCEDURE — 65660000000 HC RM CCU STEPDOWN

## 2017-11-21 PROCEDURE — 80053 COMPREHEN METABOLIC PANEL: CPT | Performed by: EMERGENCY MEDICINE

## 2017-11-21 PROCEDURE — 74011250637 HC RX REV CODE- 250/637: Performed by: INTERNAL MEDICINE

## 2017-11-21 PROCEDURE — 85025 COMPLETE CBC W/AUTO DIFF WBC: CPT | Performed by: EMERGENCY MEDICINE

## 2017-11-21 PROCEDURE — P9016 RBC LEUKOCYTES REDUCED: HCPCS | Performed by: EMERGENCY MEDICINE

## 2017-11-21 PROCEDURE — 80048 BASIC METABOLIC PNL TOTAL CA: CPT | Performed by: EMERGENCY MEDICINE

## 2017-11-21 PROCEDURE — 36430 TRANSFUSION BLD/BLD COMPNT: CPT

## 2017-11-21 PROCEDURE — 85730 THROMBOPLASTIN TIME PARTIAL: CPT | Performed by: EMERGENCY MEDICINE

## 2017-11-21 PROCEDURE — 30233N1 TRANSFUSION OF NONAUTOLOGOUS RED BLOOD CELLS INTO PERIPHERAL VEIN, PERCUTANEOUS APPROACH: ICD-10-PCS | Performed by: INTERNAL MEDICINE

## 2017-11-21 PROCEDURE — 99285 EMERGENCY DEPT VISIT HI MDM: CPT

## 2017-11-21 RX ORDER — GUAIFENESIN 100 MG/5ML
81 LIQUID (ML) ORAL DAILY
Status: DISCONTINUED | OUTPATIENT
Start: 2017-11-22 | End: 2017-11-22 | Stop reason: HOSPADM

## 2017-11-21 RX ORDER — OXYCODONE AND ACETAMINOPHEN 5; 325 MG/1; MG/1
1 TABLET ORAL DAILY
COMMUNITY
End: 2017-12-22 | Stop reason: SDUPTHER

## 2017-11-21 RX ORDER — ERGOCALCIFEROL 1.25 MG/1
50000 CAPSULE ORAL
Status: DISCONTINUED | OUTPATIENT
Start: 2017-11-23 | End: 2017-11-22 | Stop reason: HOSPADM

## 2017-11-21 RX ORDER — BUMETANIDE 1 MG/1
1 TABLET ORAL DAILY
Status: DISCONTINUED | OUTPATIENT
Start: 2017-11-22 | End: 2017-11-22 | Stop reason: HOSPADM

## 2017-11-21 RX ORDER — CALCITRIOL 0.25 UG/1
0.25 CAPSULE ORAL
Status: DISCONTINUED | OUTPATIENT
Start: 2017-11-22 | End: 2017-11-22 | Stop reason: HOSPADM

## 2017-11-21 RX ORDER — SODIUM CHLORIDE 9 MG/ML
250 INJECTION, SOLUTION INTRAVENOUS AS NEEDED
Status: DISCONTINUED | OUTPATIENT
Start: 2017-11-21 | End: 2017-11-22 | Stop reason: HOSPADM

## 2017-11-21 RX ORDER — METOLAZONE 5 MG/1
5 TABLET ORAL
COMMUNITY
End: 2018-05-30

## 2017-11-21 RX ORDER — CARVEDILOL 12.5 MG/1
25 TABLET ORAL 2 TIMES DAILY WITH MEALS
Status: DISCONTINUED | OUTPATIENT
Start: 2017-11-21 | End: 2017-11-22 | Stop reason: HOSPADM

## 2017-11-21 RX ORDER — PANTOPRAZOLE SODIUM 40 MG/1
40 TABLET, DELAYED RELEASE ORAL
Status: DISCONTINUED | OUTPATIENT
Start: 2017-11-22 | End: 2017-11-22 | Stop reason: HOSPADM

## 2017-11-21 RX ORDER — FLUTICASONE PROPIONATE 50 MCG
2 SPRAY, SUSPENSION (ML) NASAL
Status: DISCONTINUED | OUTPATIENT
Start: 2017-11-21 | End: 2017-11-22 | Stop reason: HOSPADM

## 2017-11-21 RX ORDER — SODIUM CHLORIDE 0.9 % (FLUSH) 0.9 %
5-10 SYRINGE (ML) INJECTION EVERY 8 HOURS
Status: DISCONTINUED | OUTPATIENT
Start: 2017-11-21 | End: 2017-11-22 | Stop reason: HOSPADM

## 2017-11-21 RX ORDER — ISOSORBIDE MONONITRATE 30 MG/1
120 TABLET, EXTENDED RELEASE ORAL DAILY
Status: DISCONTINUED | OUTPATIENT
Start: 2017-11-22 | End: 2017-11-22 | Stop reason: HOSPADM

## 2017-11-21 RX ORDER — OXYCODONE AND ACETAMINOPHEN 5; 325 MG/1; MG/1
1 TABLET ORAL DAILY
Status: DISCONTINUED | OUTPATIENT
Start: 2017-11-22 | End: 2017-11-22 | Stop reason: HOSPADM

## 2017-11-21 RX ORDER — HYDROXYZINE 25 MG/1
25 TABLET, FILM COATED ORAL
Status: DISCONTINUED | OUTPATIENT
Start: 2017-11-21 | End: 2017-11-22 | Stop reason: HOSPADM

## 2017-11-21 RX ORDER — ASPIRIN 325 MG
325 TABLET ORAL DAILY
Status: DISCONTINUED | OUTPATIENT
Start: 2017-11-22 | End: 2017-11-21

## 2017-11-21 RX ORDER — HYDRALAZINE HYDROCHLORIDE 20 MG/ML
10 INJECTION INTRAMUSCULAR; INTRAVENOUS
Status: DISCONTINUED | OUTPATIENT
Start: 2017-11-21 | End: 2017-11-22 | Stop reason: HOSPADM

## 2017-11-21 RX ORDER — OXYCODONE AND ACETAMINOPHEN 5; 325 MG/1; MG/1
TABLET ORAL
Qty: 20 TAB | Refills: 0 | Status: SHIPPED | OUTPATIENT
Start: 2017-11-21 | End: 2017-11-21

## 2017-11-21 RX ORDER — CETIRIZINE HCL 10 MG
10 TABLET ORAL
Status: DISCONTINUED | OUTPATIENT
Start: 2017-11-21 | End: 2017-11-22 | Stop reason: HOSPADM

## 2017-11-21 RX ORDER — ALBUTEROL SULFATE 90 UG/1
2 AEROSOL, METERED RESPIRATORY (INHALATION)
COMMUNITY
End: 2019-11-14 | Stop reason: SDUPTHER

## 2017-11-21 RX ORDER — INSULIN LISPRO 100 [IU]/ML
INJECTION, SOLUTION INTRAVENOUS; SUBCUTANEOUS
Status: DISCONTINUED | OUTPATIENT
Start: 2017-11-21 | End: 2017-11-22 | Stop reason: HOSPADM

## 2017-11-21 RX ORDER — OXYCODONE AND ACETAMINOPHEN 5; 325 MG/1; MG/1
2 TABLET ORAL
Status: DISCONTINUED | OUTPATIENT
Start: 2017-11-21 | End: 2017-11-22 | Stop reason: HOSPADM

## 2017-11-21 RX ORDER — ALLOPURINOL 100 MG/1
100 TABLET ORAL DAILY
Status: DISCONTINUED | OUTPATIENT
Start: 2017-11-22 | End: 2017-11-22 | Stop reason: HOSPADM

## 2017-11-21 RX ORDER — MAGNESIUM SULFATE 100 %
4 CRYSTALS MISCELLANEOUS AS NEEDED
Status: DISCONTINUED | OUTPATIENT
Start: 2017-11-21 | End: 2017-11-22 | Stop reason: HOSPADM

## 2017-11-21 RX ORDER — ATORVASTATIN CALCIUM 20 MG/1
80 TABLET, FILM COATED ORAL EVERY EVENING
Status: DISCONTINUED | OUTPATIENT
Start: 2017-11-21 | End: 2017-11-22 | Stop reason: HOSPADM

## 2017-11-21 RX ORDER — ALLOPURINOL 100 MG/1
100 TABLET ORAL DAILY
COMMUNITY
End: 2019-01-08 | Stop reason: SDUPTHER

## 2017-11-21 RX ORDER — DEXTROSE 50 % IN WATER (D50W) INTRAVENOUS SYRINGE
12.5-25 AS NEEDED
Status: DISCONTINUED | OUTPATIENT
Start: 2017-11-21 | End: 2017-11-22 | Stop reason: HOSPADM

## 2017-11-21 RX ORDER — WARFARIN 2 MG/1
2 TABLET ORAL
COMMUNITY
End: 2017-12-06

## 2017-11-21 RX ORDER — ALBUTEROL SULFATE 0.83 MG/ML
2.5 SOLUTION RESPIRATORY (INHALATION)
Status: DISCONTINUED | OUTPATIENT
Start: 2017-11-21 | End: 2017-11-22 | Stop reason: HOSPADM

## 2017-11-21 RX ORDER — CLONIDINE HYDROCHLORIDE 0.1 MG/1
0.1 TABLET ORAL
Status: DISCONTINUED | OUTPATIENT
Start: 2017-11-21 | End: 2017-11-22 | Stop reason: HOSPADM

## 2017-11-21 RX ORDER — WARFARIN 3 MG/1
3 TABLET ORAL
COMMUNITY
End: 2017-12-06

## 2017-11-21 RX ORDER — BUMETANIDE 0.5 MG/1
1 TABLET ORAL DAILY
COMMUNITY
End: 2017-12-23 | Stop reason: SDUPTHER

## 2017-11-21 RX ORDER — SODIUM CHLORIDE 0.9 % (FLUSH) 0.9 %
5-10 SYRINGE (ML) INJECTION AS NEEDED
Status: DISCONTINUED | OUTPATIENT
Start: 2017-11-21 | End: 2017-11-22 | Stop reason: HOSPADM

## 2017-11-21 RX ORDER — NITROGLYCERIN 0.4 MG/1
0.4 TABLET SUBLINGUAL
Status: DISCONTINUED | OUTPATIENT
Start: 2017-11-21 | End: 2017-11-22 | Stop reason: HOSPADM

## 2017-11-21 RX ORDER — CETIRIZINE HCL 10 MG
10 TABLET ORAL
COMMUNITY
End: 2018-05-30

## 2017-11-21 RX ADMIN — OXYCODONE HYDROCHLORIDE AND ACETAMINOPHEN 2 TABLET: 5; 325 TABLET ORAL at 23:50

## 2017-11-21 RX ADMIN — Medication 10 ML: at 21:32

## 2017-11-21 RX ADMIN — OXYCODONE HYDROCHLORIDE AND ACETAMINOPHEN 2 TABLET: 5; 325 TABLET ORAL at 16:51

## 2017-11-21 RX ADMIN — HYDRALAZINE HYDROCHLORIDE 10 MG: 20 INJECTION INTRAMUSCULAR; INTRAVENOUS at 23:51

## 2017-11-21 RX ADMIN — CARVEDILOL 25 MG: 12.5 TABLET, FILM COATED ORAL at 21:31

## 2017-11-21 RX ADMIN — ATORVASTATIN CALCIUM 80 MG: 20 TABLET, FILM COATED ORAL at 21:31

## 2017-11-21 RX ADMIN — Medication 10 ML: at 16:16

## 2017-11-21 RX ADMIN — CLONIDINE HYDROCHLORIDE 0.1 MG: 0.1 TABLET ORAL at 16:51

## 2017-11-21 NOTE — Clinical Note
Status[de-identified] Inpatient [101] Type of Bed: Telemetry [19] Inpatient Hospitalization Certified Necessary for the Following Reasons: 3. Patient receiving treatment that can only be provided in an inpatient setting (further clarification in H&P documentation) Admitting Diagnosis: Chest pain [[id number]] Admitting Physician: Shanda Noguera Attending Physician: Shanda Noguera Estimated Length of Stay: 2 Midnights Discharge Plan[de-identified] Home with Office Follow-up

## 2017-11-21 NOTE — ED NOTES
Pt refuses family practice to admit patient and requests hospitalist. Family practice physicians and Dr. Mina Green told.

## 2017-11-21 NOTE — CONSULTS
Zac Medina MD [facility] [address]  Office [phone]  Mobile [phone]    Date of  Admission: 11/21/2017 12:16 PM  PCP- Azael Ramires DO    Cecilia Degroot is a 63 y.o. female admitted for Chest pain  Chest pain. Consult requested by Claudia Landry MD/Viet Johnson MD    Assessment  · Progressive anemia, symptomatic - due to blood loss/CKD? · Chronic diastolic HF - compensated. PAD by CardioMEMS today normal  · CAD with 1v disease/RCA  - progressive angina likely due to anemia  · ILD, oxygen requiring  · Morbid obesity  · T2DM  · CKD, stage 4 ([place])  · JASEN on CPAP  · Chronic anticoagulation due to DVT        Discussion/Recommendations:  Complex case. Progressive angina due to severe anemia. Agree with transfusion. May need Procrit. Evaluate for blood loss. Would defer cath until stability of Hgb established and absence of blood loss confirmed  Continue Bumex 1 mg/d but may need extra based on increased blood volume - will monitor PAD    Subjective:  Patient well known to me. See office note from yesterday. Precath labs revealed Hgb 7.4 - sent to ED for further evaluation/transfusion. Patient reports worsening LARA and chest pain walking short distances x 2 weeks. She states \" it feels like my whole chest is going to cave in.\" She has had to take NTG three times this week with relief of chest pain.      There has been significant variation in PAD measurements anywhere from 8-16. Last week it was 12-14. PAD today is 9.      She also states she has been getting headaches and has had several episodes of epistaxis. Her INR was 1.6 the other day. She has not seen an ENT.      She has chronic LE edema, R>L but has had the DVT in her right leg too. She also continues to gain weight. Patient denies any palpitations, syncope, orthopnea, or paroxysmal nocturnal dyspnea.     She is here with her  today.       Cardiac testing  CATH: 2004: 1v CAD by cath - PCI OM with SAL  CATH 5/2004 - patent stent, no new disease   Lexiscan cardiolite 12/09- normal perfusion, EF 66%  ECHO 11/2009: LVH, EF 48%, diastolic dysfunction  STRESS/LEXISCAN/CARDIOLITE 3/29/11: normal perfusion, EF 62%  CATH: 3/20/2012 :PA 55/30 mean 41, wedge 26, RA 24, EDP 35, LVG 55%, LM normal, LAD normal, LCX - OM1 patent stent, OM2 prox 85% long, % w/ L -> R collateral; s/p SAL-> OM2/OM3 with SAL. CATH: 10/4/2012: EDP 25, EF 55%, LM nl, LAD mild plaque, LCX patent OM stents, % prox with good L to R collaterals. Cath 3/18/2014 - RA 9 PA 42/19/29, PCW 12, EDP 25, no LVG due to CKD, LM nl, LAD mild plaque, LCX patent stents OM1/OM2, RCA chronic proximal occlusion with L to R collaterals. ECHO 4/11/16: EF 60-65%. No WMA. LA mildly dilated. Lexiscan Cardiolite 4/11/16: Normal. LVEF 55%. Compared to 3/29/11, no significant changes. RHC/CardioMEMS implant 8/31/17 - RA 12, PA 56/20/30, CI (Mayte) 2.65      No current facility-administered medications on file prior to encounter.       Current Outpatient Prescriptions on File Prior to Encounter   Medication Sig Dispense Refill    oxyCODONE-acetaminophen (PERCOCET) 5-325 mg per tablet TAKE 1 TABLET BY MOUTH EVERY 6 HOURS AS NEEDED FOR PAIN, MAX DAILY AMOUNT OF 4 TABLETS 20 Tab 0    warfarin (COUMADIN) 2 mg tablet 3mg on Tuesday wed , thurs, sat Sunday, 2mg Mon, friday (Patient taking differently: 3mg on Tuesday wed , thurs, sat Sunday, friday, 2mg Mon) 30 Tab 0    Insulin Lisp & Lisp Prot, Hum, (HUMALOG MIX 75-25 KWIKPEN) 100 unit/mL (75-25) inpn Use per sliding scale 2 Pen 0    bumetanide (BUMEX) 1 mg tablet Take 1 tablet daily or as directed 90 Tab 1    carvedilol (COREG) 25 mg tablet TAKE 1 TABLET BY MOUTH TWICE A  Tab 3    isosorbide mononitrate ER (IMDUR) 120 mg CR tablet TAKE 1 TABLET BY MOUTH EVERY DAY 90 Tab 3    pantoprazole (PROTONIX) 40 mg tablet TAKE 1 TABLET BY MOUTH EVERY DAY FOR HEARTBURN 90 Tab 2    allopurinol (ZYLOPRIM) 100 mg tablet Take 2 Tabs by mouth daily. 60 Tab 5    colchicine (COLCRYS) 0.6 mg tablet 1.2mg Po at first sign of flare then 0.6mg 1 hour later, do not repeat markos frequently than Q2 weeks 30 Tab 1    cetirizine (ZYRTEC) 5 mg tablet Take 1 Tab by mouth daily. 30 Tab 11    hydrOXYzine pamoate (VISTARIL) 25 mg capsule Take 1 Cap by mouth three (3) times daily as needed for Anxiety. 30 Cap 1    imipramine (TOFRANIL) 25 mg tablet Take 1 Tab by mouth nightly. For esophageal spasm (Patient taking differently: Take 25 mg by mouth as needed. For esophageal spasm) 30 Tab 5    albuterol (PROVENTIL VENTOLIN) 2.5 mg /3 mL (0.083 %) nebulizer solution       insulin aspart protamine/insulin aspart (NOVOLOG MIX 70-30) 100 unit/mL (70-30) injection by SubCUTAneous route. Uses sliding scale      ondansetron (ZOFRAN ODT) 4 mg disintegrating tablet Take 1 Tab by mouth every six (6) hours as needed for Nausea. (Patient taking differently: Take 4 mg by mouth as needed for Nausea.) 30 Tab 0    fluticasone (FLONASE) 50 mcg/actuation nasal spray 2 Sprays by Both Nostrils route nightly as needed.  calcitRIOL (ROCALTROL) 0.25 mcg capsule Take 0.25 mcg by mouth four (4) days a week. Patient takes on Monday and Thursday       nitroglycerin (NITROSTAT) 0.3 mg SL tablet 1 Tab by SubLINGual route every five (5) minutes as needed for Chest Pain. 1 Bottle 1    ergocalciferol (VITAMIN D2) 50,000 unit capsule Take 50,000 Units by mouth every Tuesday and Thursday.  aspirin 81 mg tablet Take 81 mg by mouth daily.  atorvastatin (LIPITOR) 80 mg tablet Take 80 mg by mouth every evening.          Allergies   Allergen Reactions    Contrast Dye [Iodine] Anaphylaxis    Levaquin [Levofloxacin] Nausea and Vomiting    Morphine Hives and Itching             Review of Symptoms:  Constitutional: negative for fevers and chills  Respiratory: negative for hemoptysis  Gastrointestinal: negative for dysphagia, odynophagia and melena  Musculoskeletal:positive for arthralgias  Neurological: negative for vertigo, seizures and memory problems  Other systems reviewed and negative except as above. Physical Exam    Visit Vitals    BP (!) 195/99    Pulse 69    Temp 99 °F (37.2 °C)    Resp 20    Ht 5' 10\" (1.778 m)    Wt 340 lb (154.2 kg)    SpO2 99%    BMI 48.78 kg/m2     General - morbidly obese BF, + pallor  Neck - JVP elevated, thyroid normal  Cardiac - normal S1,S2, no murmurs, rubs or gallops. No clicks  Vascular - carotids without bruits, radials, femorals and pedal pulses equal bilateral  Lungs - clear to auscultation bilaterals, no rales, wheezing or rhonchi  Abd - soft nontender, no HSM, no abd bruits  Extremities - 1+ LE edema  Skin - no rash  Neuro - nonfocal  Psych - normal mood and affect       Cardiographics        Recent Results (from the past 12 hour(s))   PTT    Collection Time: 11/21/17 12:40 PM   Result Value Ref Range    aPTT 29.9 22.1 - 32.5 sec    aPTT, therapeutic range     58.0 - 77.0 SECS   CBC WITH AUTOMATED DIFF    Collection Time: 11/21/17 12:40 PM   Result Value Ref Range    WBC 7.6 3.6 - 11.0 K/uL    RBC 2.39 (L) 3.80 - 5.20 M/uL    HGB 7.4 (L) 11.5 - 16.0 g/dL    HCT 23.3 (L) 35.0 - 47.0 %    MCV 97.5 80.0 - 99.0 FL    MCH 31.0 26.0 - 34.0 PG    MCHC 31.8 30.0 - 36.5 g/dL    RDW 14.2 11.5 - 14.5 %    PLATELET 344 (L) 400 - 400 K/uL    NEUTROPHILS 67 32 - 75 %    LYMPHOCYTES 22 12 - 49 %    MONOCYTES 6 5 - 13 %    EOSINOPHILS 5 0 - 7 %    BASOPHILS 0 0 - 1 %    ABS. NEUTROPHILS 5.1 1.8 - 8.0 K/UL    ABS. LYMPHOCYTES 1.7 0.8 - 3.5 K/UL    ABS. MONOCYTES 0.5 0.0 - 1.0 K/UL    ABS. EOSINOPHILS 0.4 0.0 - 0.4 K/UL    ABS.  BASOPHILS 0.0 0.0 - 0.1 K/UL   METABOLIC PANEL, COMPREHENSIVE    Collection Time: 11/21/17 12:40 PM   Result Value Ref Range    Sodium 144 136 - 145 mmol/L    Potassium 4.2 3.5 - 5.1 mmol/L    Chloride 108 97 - 108 mmol/L    CO2 25 21 - 32 mmol/L    Anion gap 11 5 - 15 mmol/L Glucose 186 (H) 65 - 100 mg/dL    BUN 44 (H) 6 - 20 MG/DL    Creatinine 3.24 (H) 0.55 - 1.02 MG/DL    BUN/Creatinine ratio 14 12 - 20      GFR est AA 18 (L) >60 ml/min/1.73m2    GFR est non-AA 15 (L) >60 ml/min/1.73m2    Calcium 8.1 (L) 8.5 - 10.1 MG/DL    Bilirubin, total 0.5 0.2 - 1.0 MG/DL    ALT (SGPT) 19 12 - 78 U/L    AST (SGOT) 24 15 - 37 U/L    Alk.  phosphatase 94 45 - 117 U/L    Protein, total 6.8 6.4 - 8.2 g/dL    Albumin 3.2 (L) 3.5 - 5.0 g/dL    Globulin 3.6 2.0 - 4.0 g/dL    A-G Ratio 0.9 (L) 1.1 - 2.2     TROPONIN I    Collection Time: 11/21/17 12:40 PM   Result Value Ref Range    Troponin-I, Qt. <0.04 <0.05 ng/mL   PROTHROMBIN TIME + INR    Collection Time: 11/21/17 12:40 PM   Result Value Ref Range    INR 2.7 (H) 0.9 - 1.1      Prothrombin time 28.4 (H) 9.0 - 11.1 sec   TYPE & SCREEN    Collection Time: 11/21/17 12:40 PM   Result Value Ref Range    Crossmatch Expiration 11/24/2017     ABO/Rh(D) A POSITIVE     Antibody screen NEG     Unit number [id number]     Blood component type RC LR AS1     Unit division 00     Status of unit ISSUED     Crossmatch result Compatible    METABOLIC PANEL, BASIC    Collection Time: 11/21/17  4:06 PM   Result Value Ref Range    Sodium 146 (H) 136 - 145 mmol/L    Potassium 3.9 3.5 - 5.1 mmol/L    Chloride 109 (H) 97 - 108 mmol/L    CO2 26 21 - 32 mmol/L    Anion gap 11 5 - 15 mmol/L    Glucose 111 (H) 65 - 100 mg/dL    BUN 44 (H) 6 - 20 MG/DL    Creatinine 3.09 (H) 0.55 - 1.02 MG/DL    BUN/Creatinine ratio 14 12 - 20      GFR est AA 19 (L) >60 ml/min/1.73m2    GFR est non-AA 15 (L) >60 ml/min/1.73m2    Calcium 8.1 (L) 8.5 - 10.1 MG/DL   CBC WITH AUTOMATED DIFF    Collection Time: 11/21/17  4:06 PM   Result Value Ref Range    WBC 7.8 3.6 - 11.0 K/uL    RBC 2.23 (L) 3.80 - 5.20 M/uL    HGB 6.8 (L) 11.5 - 16.0 g/dL    HCT 21.3 (L) 35.0 - 47.0 %    MCV 95.5 80.0 - 99.0 FL    MCH 30.5 26.0 - 34.0 PG    MCHC 31.9 30.0 - 36.5 g/dL    RDW 14.1 11.5 - 14.5 %    PLATELET 262 150 - 400 K/uL    NEUTROPHILS 59 32 - 75 %    LYMPHOCYTES 30 12 - 49 %    MONOCYTES 6 5 - 13 %    EOSINOPHILS 5 0 - 7 %    BASOPHILS 0 0 - 1 %    ABS. NEUTROPHILS 4.6 1.8 - 8.0 K/UL    ABS. LYMPHOCYTES 2.3 0.8 - 3.5 K/UL    ABS. MONOCYTES 0.5 0.0 - 1.0 K/UL    ABS. EOSINOPHILS 0.4 0.0 - 0.4 K/UL    ABS.  BASOPHILS 0.0 0.0 - 0.1 K/UL    DF SMEAR SCANNED      RBC COMMENTS MICROCYTOSIS  PRESENT       TROPONIN I    Collection Time: 11/21/17  4:06 PM   Result Value Ref Range    Troponin-I, Qt. <0.04 <0.05 ng/mL   GLUCOSE, POC    Collection Time: 11/21/17  4:41 PM   Result Value Ref Range    Glucose (POC) 106 (H) 65 - 100 mg/dL    Performed by Araseli Cid

## 2017-11-21 NOTE — ED NOTES
Pt resting in room, pt alert and oriented x3, normal sinus rythm on the monitor, vs stable as noted, pt has no new complaints at this time. Call bell within reach, bed in low position and locked. Pt aware to call the RN on the call bell if needs anything. Pt verbalizes understanding. Pt denies any CP and SOB. Pt repositioned in bed.

## 2017-11-21 NOTE — PROGRESS NOTES
11/21/2017   CARE MANAGEMENT NOTE:  CM is following pt in the ER for initial discharge planning. EMR reviewed. CM met with pt and her  ([phone]) at bedside to obtain hx for this needs assessment. Reportedly, pt resides with her  in a one story home with two entry steps. PTA, pt was ambulatory with a cane at times, and is indepn with ADLs. She no longer drives. Pt is on disability and she has RX drug coverage. She uses [facility] pharmacy on [address]  Pt does not have home healthcare currently. DME in the home includes a cane, rolling walker, rollator, BSC, oxygen and an inoperable nebulizer. PCP is Dr. Patricia Sterling. CM notified Nurse Navigator, Art Loges of pt's hospital admission. Plan is to return home when medically stable. CM please confirm status of he current nebulizer and address steps for repair or replace.   K[place]

## 2017-11-21 NOTE — ED PROVIDER NOTES
HPI Comments: 63 y.o. female with extensive past medical history, please see list, significant for CAD, COPD, and GERD who presents from home via private vehicle with chief complaint of SOB. Pt was sent from Dr. Kota Velasquez in cardiology for exertional SOB and dyspnea. Pt reports worsening SOB over the last 2 weeks. Pt states today less and less exertion causes SOB. The pt was found to have a hgb of 7.2. Pt has a cardiac catheterization scheduled in one week and is currently being treated for a DVT in her RLE. Pt reports wearing 2 L of O2 at home because of COPD. Pt has been intermittently using nitro for chest pain. Currently in the ED, the pt has minimal CP and minimal SOB at rest. There are no other acute medical concerns at this time. Social hx: Former smoker (Quit 2014); No EtOH use  PCP: Paul Palumbo DO    Note written by Sakshi Alexis, as dictated by Karli Dooley MD 1:12 PM      The history is provided by the patient. No  was used.         Past Medical History:   Diagnosis Date     Sleep Apnea 2/16/2011    Angina, class III ([address] 8/9/2013    Aortic aneurysm ([address]     CAD (coronary Artery Disease) Native Artery 2/16/2011    CKD (chronic kidney disease) stage 4, GFR 15-29 ml/min ([address] 2/10/2017    Diabetic gastroparesis (HCC)     Diastolic heart failure ([address] 10/5/2012    Esophageal stricture 2012    dialted Dr. Kendall Schaumann G6PD deficiency ([address]     trait    GERD (gastroesophageal reflux disease)     Hypertensive Cardiovasc Dis Benign, No CHF 2/16/2011    ILD (interstitial lung disease) ([address]     open lung bx CJW 2010    OA (osteoarthritis)     Obesity 2/16/2011    Ovarian cancer ([address]     cervical and uterine    PNA (pneumonia)     Rheumatoid arteritis     S/P left pulmonary artery pressure sensor implant placement 8/31/2017    Cardiomems     T2DM (type 2 diabetes mellitus) ([address] 8/9/2013    Tobacco use disorder 3/21/2012    Uterine cervix cancer ([address]  Vitamin D deficiency 8/9/2013       Past Surgical History:   Procedure Laterality Date    CARDIAC SURG PROCEDURE UNLIST      stents    COLONOSCOPY N/A 6/24/2016    COLONOSCOPY performed by Abhijeet Arciniega MD at [address] HX APPENDECTOMY      HX CARPAL TUNNEL RELEASE      bilateral    HX CHOLECYSTECTOMY      HX HERNIA REPAIR      HX HYSTERECTOMY      HX ORTHOPAEDIC  11/12/12    right knee replacement    HX TONSIL AND ADENOIDECTOMY      UPPER GI ENDOSCOPY,VINOD W GUIDE  6/24/2016              Family History:   Problem Relation Age of Onset    Heart Disease Mother     Heart Disease Brother        Social History     Social History    Marital status:      Spouse name: N/A    Number of children: N/A    Years of education: N/A     Occupational History    Not on file. Social History Main Topics    Smoking status: Former Smoker     Packs/day: 0.50     Years: 41.00     Types: Cigarettes     Quit date: 11/9/2014    Smokeless tobacco: Never Used    Alcohol use No    Drug use: No    Sexual activity: Not on file     Other Topics Concern    Not on file     Social History Narrative         ALLERGIES: Contrast dye [iodine]; Levaquin [levofloxacin]; and Morphine    Review of Systems   Constitutional: Negative. Negative for appetite change, fever and unexpected weight change. HENT: Negative. Negative for ear pain, hearing loss, nosebleeds, rhinorrhea, sore throat and trouble swallowing. Respiratory: Positive for shortness of breath (with exertion). Negative for cough and chest tightness. Cardiovascular: Positive for chest pain (with exertion). Negative for palpitations. Gastrointestinal: Negative. Negative for abdominal distention, abdominal pain, blood in stool and vomiting. Endocrine: Negative. Genitourinary: Negative for dysuria and hematuria. Musculoskeletal: Negative. Negative for back pain and myalgias. Skin: Negative. Negative for rash. Allergic/Immunologic: Negative. Neurological: Negative. Negative for dizziness, syncope, weakness and numbness. Hematological: Negative. Psychiatric/Behavioral: Negative. All other systems reviewed and are negative. Vitals:    11/21/17 1300 11/21/17 1315 11/21/17 1330 11/21/17 1330   BP: 164/71 180/76 173/84    Pulse: 78 73 72    Resp: 22 29 16    Temp:       SpO2: 98% 99% 99% 98%   Weight:       Height:                Physical Exam   Constitutional: She is oriented to person, place, and time. She appears well-developed. No distress. Morbidly obese. Slightly dyspneic appearing. HENT:   Head: Normocephalic and atraumatic. Right Ear: External ear normal.   Left Ear: External ear normal.   Nose: Nose normal.   Mouth/Throat: Oropharynx is clear and moist.   Eyes: Conjunctivae and EOM are normal. Pupils are equal, round, and reactive to light. Neck: Normal range of motion. Neck supple. No JVD present. No thyromegaly present. Cardiovascular: Normal rate, regular rhythm, normal heart sounds and intact distal pulses. No murmur heard. Pulmonary/Chest: Effort normal and breath sounds normal. No respiratory distress. She has no wheezes. She has no rales. Abdominal: Soft. Bowel sounds are normal. She exhibits no distension. There is no tenderness. Musculoskeletal: Normal range of motion. She exhibits tenderness. She exhibits no edema. Has RLE swelling and tenderness due to DVT. No pitting edema. Neurological: She is alert and oriented to person, place, and time. No cranial nerve deficit. Skin: Skin is warm and dry. No rash noted. Psychiatric: She has a normal mood and affect. Her behavior is normal. Thought content normal.   Nursing note and vitals reviewed. Note written by Ukash Yady ROSARIO Ridley, as dictated by Magdalene Tolliver MD 1:12 PM      [facility]  ED Course       Procedures    ED EKG interpretation:  Rhythm: normal sinus rhythm; and regular .  Rate (approx.): 83 bpm; Axis: normal; ST/T wave: normal.     Note written by Yamila Miranda, as dictated by Sergio Kurtz MD 12:20 PM         CONSULT NOTE:  2:24 PM Sergio Kurtz MD spoke with [facility] Resident, Consult for [facility]. Discussed available diagnostic tests and clinical findings. He is in agreement with care plans as outlined. The resident will see and admit the pt for symptomatic anemia and SOB. PROGRESS NOTE:  2:32 PM  Pt is refusing admission by family practice and wants a hospitalist to admit. Spoke with Dr. Caitlin Valentin, who agrees to see and admit the patient.

## 2017-11-21 NOTE — PROGRESS NOTES
[facility] Dosing Services: 11/21/17    Consult for Warfarin Dosing by Pharmacy by Dr. Kristin Redd provided for this 63 y.o.  female , for indication of Venous Thrombosis (hx DVT)  PTA Dose:  2mg on Fri, 3mg Sat-Thurs  Dose to achieve an INR goal of 2-3    Pt already took 3mg this morning    Significant drug interactions: asa  Previous dose given 3mg (PTA 11/21/17 in AM)   PT/INR Lab Results   Component Value Date/Time    INR 2.7 11/21/2017 12:40 PM      Platelets Lab Results   Component Value Date/Time    PLATELET 679 62/84/8993 04:06 PM      H/H Lab Results   Component Value Date/Time    HGB 6.8 11/21/2017 04:06 PM        Pharmacy to follow daily and will provide subsequent Warfarin dosing based on clinical status. Eduard Mortensen, [facility]  Berny Hugo [facility] [phone]

## 2017-11-21 NOTE — TELEPHONE ENCOUNTER
Lab Results   Component Value Date/Time    Sodium 144 11/20/2017 10:28 AM    Potassium 4.3 11/20/2017 10:28 AM    Chloride 106 11/20/2017 10:28 AM    CO2 22 11/20/2017 10:28 AM    Anion gap 11 09/01/2017 01:38 PM    Glucose 127 11/20/2017 10:28 AM    BUN 43 11/20/2017 10:28 AM    Creatinine 3.21 11/20/2017 10:28 AM    BUN/Creatinine ratio 13 11/20/2017 10:28 AM    GFR est AA 17 11/20/2017 10:28 AM    GFR est non-AA 15 11/20/2017 10:28 AM    Calcium 8.2 11/20/2017 10:28 AM     Lab Results   Component Value Date/Time    WBC 7.9 11/20/2017 10:28 AM    Hemoglobin (POC) 9.9 07/21/2013 09:51 PM    HGB 7.2 11/20/2017 10:28 AM    Hematocrit (POC) 29 07/21/2013 09:51 PM    HCT 21.6 11/20/2017 10:28 AM    PLATELET 846 14/28/7711 10:28 AM    MCV 95 11/20/2017 10:28 AM     Repeat lab work performed prior to repeat left heart cath to evaluate worsening dyspnea and chest pain. Hgb found to be 7.2  Last evaluation per chart review by Dr. Rose Hoover 3/30/17 - Hgb 9.3, Fe 34, TIBC 249, FeSat 13.7    She is on Coumadin per Dr. Adriana Andrews office for hx of DVT. Reported frequent epistaxis during office visit with Dr. Joyce Ellis yesterday - no ENT follow up at this point. CardioMEMS data from today - PAd 8 on Bumex 1 mg daily. When we spoke on the phone she is reporting worsening dyspnea today, clearly not due to volume overload per PA monitoring. Reviewed with Dr. Joyce Ellis and have advised her to present to the ER for further evaluation and transfusion. She voices understanding of our recommendations and will have her  drive her here shortly.

## 2017-11-21 NOTE — H&P
[address] [place] [address]  [phone]    Admission History and Physical      NAME:  Elian Carreno   :   1955   MRN:  [id number]     PCP:  Sari Joyner DO     Date/Time:  2017         Subjective:     CHIEF COMPLAINT: chest pain, SOB     HISTORY OF PRESENT ILLNESS:     Ms. Ronda Figueroa is a 63 y.o.  female who is admitted with chest pain. Ms. Ronda Figueroa with PMH of CAD s/p stents, HTN, CKD, JASEN, G6PD deficiency, ILD, OS, obesity, DM presented to ER c/o chest pain, which is more exertional. Pt was seen by cardiologist yesterday and was told to ER for blood transfusion. The chest pain is sharp, moderate in intensity, associated with SOB, but denies nausea or vomiting. Feels fatigued. She was scheduled for cardiac cath next week.      Past Medical History:   Diagnosis Date     Sleep Apnea 2011    Angina, class III ([address] 2013    Aortic aneurysm ([address]     CAD (coronary Artery Disease) Native Artery 2011    CKD (chronic kidney disease) stage 4, GFR 15-29 ml/min ([address] 2/10/2017    Diabetic gastroparesis (HCC)     Diastolic heart failure ([address] 10/5/2012    Esophageal stricture     dialted Dr. Darryle Catching G6PD deficiency ([address]     trait    GERD (gastroesophageal reflux disease)     Hypertensive Cardiovasc Dis Benign, No CHF 2011    ILD (interstitial lung disease) ([address]     open lung bx CJW     OA (osteoarthritis)     Obesity 2011    Ovarian cancer ([address]     cervical and uterine    PNA (pneumonia)     Rheumatoid arteritis     S/P left pulmonary artery pressure sensor implant placement 2017    Cardiomems     T2DM (type 2 diabetes mellitus) ([address] 2013    Tobacco use disorder 3/21/2012    Uterine cervix cancer ([address]     Vitamin D deficiency 2013        Past Surgical History:   Procedure Laterality Date    CARDIAC SURG PROCEDURE UNLIST      stents    COLONOSCOPY N/A 6/24/2016    COLONOSCOPY performed by Naa Foley MD at [facility] HX APPENDECTOMY      HX CARPAL TUNNEL RELEASE      bilateral    HX CHOLECYSTECTOMY      HX HERNIA REPAIR      HX HYSTERECTOMY      HX ORTHOPAEDIC  11/12/12    right knee replacement    HX TONSIL AND ADENOIDECTOMY      UPPER GI ENDOSCOPY,VINOD PINZON  6/24/2016            Social History   Substance Use Topics    Smoking status: Former Smoker     Packs/day: 0.50     Years: 41.00     Types: Cigarettes     Quit date: 11/9/2014    Smokeless tobacco: Never Used    Alcohol use No        Family History   Problem Relation Age of Onset    Heart Disease Mother     Heart Disease Brother         Allergies   Allergen Reactions    Contrast Dye [Iodine] Anaphylaxis    Levaquin [Levofloxacin] Nausea and Vomiting    Morphine Hives and Itching        Prior to Admission medications    Medication Sig Start Date End Date Taking?  Authorizing Provider   oxyCODONE-acetaminophen (PERCOCET) 5-325 mg per tablet TAKE 1 TABLET BY MOUTH EVERY 6 HOURS AS NEEDED FOR PAIN, MAX DAILY AMOUNT OF 4 TABLETS 11/21/17   Rehan Levine DO   warfarin (COUMADIN) 2 mg tablet 3mg on Tuesday wed , thurs, sat Sunday, 2mg Mon, friday  Patient taking differently: 3mg on Tuesday wed , thurs, sat Sunday, friday, 2mg Mon 11/14/17   Rehan Levine DO   Insulin Lisp & Lisp Prot, Hum, (HUMALOG MIX 75-25 KWIKPEN) 100 unit/mL (75-25) inpn Use per sliding scale 11/14/17   Hallie Levine DO   bumetanide (BUMEX) 1 mg tablet Take 1 tablet daily or as directed 11/10/17   Be Kerns MD   carvedilol (COREG) 25 mg tablet TAKE 1 TABLET BY MOUTH TWICE A DAY 11/2/17   Be Kerns MD   isosorbide mononitrate ER (IMDUR) 120 mg CR tablet TAKE 1 TABLET BY MOUTH EVERY DAY 10/19/17   Be Kerns MD   pantoprazole (PROTONIX) 40 mg tablet TAKE 1 TABLET BY MOUTH EVERY DAY FOR HEARTBURN 10/18/17   Ezequiel Staples MD   allopurinol (ZYLOPRIM) 100 mg tablet Take 2 Tabs by mouth daily. 10/11/17   Hallie Levine, DO   colchicine (COLCRYS) 0.6 mg tablet 1.2mg Po at first sign of flare then 0.6mg 1 hour later, do not repeat markos frequently than Q2 weeks 9/18/17   Hallie H Humbertob, DO   cetirizine (ZYRTEC) 5 mg tablet Take 1 Tab by mouth daily. 9/12/17   Jason Mildred Humbertob, DO   hydrOXYzine pamoate (VISTARIL) 25 mg capsule Take 1 Cap by mouth three (3) times daily as needed for Anxiety. 9/5/17   Hallie H Humbertob, DO   imipramine (TOFRANIL) 25 mg tablet Take 1 Tab by mouth nightly. For esophageal spasm  Patient taking differently: Take 25 mg by mouth as needed. For esophageal spasm 7/11/17   Jason Mildred Paulb, DO   albuterol (PROVENTIL VENTOLIN) 2.5 mg /3 mL (0.083 %) nebulizer solution  3/20/17   Historical Provider   insulin aspart protamine/insulin aspart (NOVOLOG MIX 70-30) 100 unit/mL (70-30) injection by SubCUTAneous route. Uses sliding scale    Historical Provider   ondansetron (ZOFRAN ODT) 4 mg disintegrating tablet Take 1 Tab by mouth every six (6) hours as needed for Nausea. Patient taking differently: Take 4 mg by mouth as needed for Nausea. 2/11/17   Page Khan MD   fluticasone (FLONASE) 50 mcg/actuation nasal spray 2 Sprays by Both Nostrils route nightly as needed. Historical Provider   calcitRIOL (ROCALTROL) 0.25 mcg capsule Take 0.25 mcg by mouth four (4) days a week. Patient takes on Monday and Thursday     Historical Provider   nitroglycerin (NITROSTAT) 0.3 mg SL tablet 1 Tab by SubLINGual route every five (5) minutes as needed for Chest Pain. 6/24/16   Joseph Gates MD   ergocalciferol (VITAMIN D2) 50,000 unit capsule Take 50,000 Units by mouth every Tuesday and Thursday. Abhijit Frey MD   aspirin 81 mg tablet Take 81 mg by mouth daily. Abhijit Frey MD   atorvastatin (LIPITOR) 80 mg tablet Take 80 mg by mouth every evening.       Historical Provider         Review of Systems:  (bold if positive, if negative)    Gen:  Eyes:  ENT:  CVS:  chest painPulm:  GI:    :    MS:  Skin: Psych:  Endo:    Hem:  Renal:    Neuro:            Objective:      VITALS:    Vital signs reviewed; most recent are:    Visit Vitals    /86    Pulse 66    Temp 98.7 °F (37.1 °C)    Resp 23    Ht 5' 10\" (1.778 m)    Wt 154.2 kg (340 lb)    SpO2 98%    BMI 48.78 kg/m2     SpO2 Readings from Last 6 Encounters:   11/21/17 98%   11/20/17 97%   11/14/17 94%   10/25/17 97%   10/10/17 94%   09/15/17 100%    O2 Flow Rate (L/min): 2 l/min   No intake or output data in the 24 hours ending 11/21/17 1532         Exam:     Physical Exam:    Gen:  obese, in no acute distress  HEENT:  Pink conjunctivae, PERRL, hearing intact to voice, moist mucous membranes  Neck:  Supple, without masses, thyroid non-tender  Resp:  No accessory muscle use, clear breath sounds without wheezes rales or rhonchi  Card:  No murmurs, normal S1, S2 without thrills, bruits or peripheral edema  Abd:  Soft, non-tender, non-distended, normoactive bowel sounds are present, no palpable organomegaly and no detectable hernias  Lymph:  No cervical or inguinal adenopathy  Musc:  No cyanosis or clubbing  Skin:  No rashes or ulcers, skin turgor is good  Neuro:  Cranial nerves are grossly intact, no focal motor weakness, follows commands appropriately  Psych:  Good insight, oriented to person, place and time, alert       Labs:    Recent Labs      11/21/17   1240   WBC  7.6   HGB  7.4*   HCT  23.3*   PLT  147*     Recent Labs      11/21/17   1240   NA  144   K  4.2   CL  108   CO2  25   GLU  186*   BUN  44*   CREA  3.24*   CA  8.1*   ALB  3.2*   TBILI  0.5   SGOT  24   ALT  19     Lab Results   Component Value Date/Time    Glucose (POC) 151 08/31/2017 07:10 AM    Glucose (POC) 194 03/10/2017 12:05 PM    Glucose POC 72 05/10/2017 04:42 PM    Glucose POC 50 05/10/2017 03:42 PM     No results for input(s): PH, PCO2, PO2, HCO3, FIO2 in the last 72 hours. No results for input(s): INR in the last 72 hours.     No lab exists for component: [employer] reviewed:   normal sinus rhythm       Assessment/Plan:    1. Symptomatic anemia. Hx of G6PD deficiency. Check iron studies, stool fr occult blood and transfuse one unit of PRBC. Pt used to see hematologist and encouraged her to see them. 2.  Angina/ Chest pain (11/21/2017)/ CAD. X of stent. Pt has a discission with Dr Kristin Delong in her office vist and planned for cardiac cath. Will consult cardiology and check serial cardiac enzymes. Continue ASA, imdur, statin and NTG       3. JASEN on CPAP (2/16/2011). May use hospital's CPAP     4. HTN (hypertension) (10/1/2012). Continue home coreg and imdur      5. DM (diabetes mellitus), type 2 with renal complications ([facility] (0/0/4450). Takes insulin 70/30 as needed at home. Cover with SSI. Check A1C    6. DVT (deep venous thrombosis) ([facility] (2/2/2017). Continue coumadin per pharmacy dosing     7. CKD (chronic kidney disease) stage 4, GFR 15-29 ml/min ([facility]) (2/10/2017). Stable creatinine. Monitor. 8.   ILD (interstitial lung disease) ([facility]) (8/20/2017)/ COPD, severe ([facility] (6/21/2017). Stable. Continue nebs     9. Esophageal reflux (10/1/2012). On PPI    10. Morbid obesity (10/1/2012). Would benefit from weight loss.       code status: Full            Previous medical records reviewed     Risk of deterioration: high      Total time spent with patient: 79 895 [address] discussed with: Patient, Family, Nursing Staff and >50% of time spent in counseling and coordination of care    Discussed:  Care Plan    Prophylaxis:  Coumadin    Probable Disposition:  Home w/Family           ___________________________________________________    Attending Physician: Emily Jeffery MD

## 2017-11-21 NOTE — ED NOTES
Dr. Stokes Sep aware of pt's elevated BP as noted and only have one IV line and blood is transfusing. New orders for clonidine po for pt's elevated BP.

## 2017-11-21 NOTE — ED NOTES
Pt assisted to bedside commode and back to bed. Pt resting in bed, vs stable as noted, NSR on monitor. Pt reports right leg pain that is normal for her after she gets up and moves around.

## 2017-11-21 NOTE — ED TRIAGE NOTES
Pt reports SOB starting 2 weeks ago that has progressively gotten worse. Pt sent from Dr. Kristin Delong for low hgb of 7.2. Reports she has a blood clot in her right leg. Reports she is on coumadin. Pt state she has a scheduled heart cath on Tuesday. States she wears 2L NC at home. Reports CP with exertion.

## 2017-11-21 NOTE — ED NOTES
Pt repositioned to side of bed, bed in low position and locked, call bell within reach. NSR on monitor. Family at bedside.

## 2017-11-22 VITALS
TEMPERATURE: 98.1 F | DIASTOLIC BLOOD PRESSURE: 88 MMHG | HEIGHT: 70 IN | RESPIRATION RATE: 22 BRPM | HEART RATE: 68 BPM | OXYGEN SATURATION: 96 % | WEIGHT: 293 LBS | SYSTOLIC BLOOD PRESSURE: 135 MMHG | BODY MASS INDEX: 41.95 KG/M2

## 2017-11-22 LAB
ABO + RH BLD: NORMAL
ANION GAP SERPL CALC-SCNC: 13 MMOL/L (ref 5–15)
ATRIAL RATE: 83 BPM
BASOPHILS # BLD: 0 K/UL (ref 0–0.1)
BASOPHILS NFR BLD: 0 % (ref 0–1)
BLD PROD TYP BPU: NORMAL
BLOOD GROUP ANTIBODIES SERPL: NORMAL
BPU ID: NORMAL
BUN SERPL-MCNC: 46 MG/DL (ref 6–20)
BUN/CREAT SERPL: 15 (ref 12–20)
CALCIUM SERPL-MCNC: 8.4 MG/DL (ref 8.5–10.1)
CALCULATED P AXIS, ECG09: 51 DEGREES
CALCULATED R AXIS, ECG10: 24 DEGREES
CALCULATED T AXIS, ECG11: 53 DEGREES
CHLORIDE SERPL-SCNC: 108 MMOL/L (ref 97–108)
CO2 SERPL-SCNC: 24 MMOL/L (ref 21–32)
CREAT SERPL-MCNC: 3.12 MG/DL (ref 0.55–1.02)
CROSSMATCH RESULT,%XM: NORMAL
DIAGNOSIS, 93000: NORMAL
EOSINOPHIL # BLD: 0.4 K/UL (ref 0–0.4)
EOSINOPHIL NFR BLD: 5 % (ref 0–7)
ERYTHROCYTE [DISTWIDTH] IN BLOOD BY AUTOMATED COUNT: 14.4 % (ref 11.5–14.5)
EST. AVERAGE GLUCOSE BLD GHB EST-MCNC: 120 MG/DL
FOLATE SERPL-MCNC: 11.2 NG/ML (ref 5–21)
GLUCOSE BLD STRIP.AUTO-MCNC: 159 MG/DL (ref 65–100)
GLUCOSE SERPL-MCNC: 149 MG/DL (ref 65–100)
HBA1C MFR BLD: 5.8 % (ref 4.2–6.3)
HCT VFR BLD AUTO: 23.5 % (ref 35–47)
HGB BLD-MCNC: 7.6 G/DL (ref 11.5–16)
INR PPP: 3.2 (ref 0.9–1.1)
LYMPHOCYTES # BLD: 2.8 K/UL (ref 0.8–3.5)
LYMPHOCYTES NFR BLD: 31 % (ref 12–49)
MCH RBC QN AUTO: 30.4 PG (ref 26–34)
MCHC RBC AUTO-ENTMCNC: 32.3 G/DL (ref 30–36.5)
MCV RBC AUTO: 94 FL (ref 80–99)
MONOCYTES # BLD: 0.6 K/UL (ref 0–1)
MONOCYTES NFR BLD: 7 % (ref 5–13)
NEUTS SEG # BLD: 5.1 K/UL (ref 1.8–8)
NEUTS SEG NFR BLD: 57 % (ref 32–75)
P-R INTERVAL, ECG05: 202 MS
PLATELET # BLD AUTO: 219 K/UL (ref 150–400)
POTASSIUM SERPL-SCNC: 3.8 MMOL/L (ref 3.5–5.1)
PROTHROMBIN TIME: 33 SEC (ref 9–11.1)
Q-T INTERVAL, ECG07: 412 MS
QRS DURATION, ECG06: 80 MS
QTC CALCULATION (BEZET), ECG08: 484 MS
RBC # BLD AUTO: 2.5 M/UL (ref 3.8–5.2)
SERVICE CMNT-IMP: ABNORMAL
SODIUM SERPL-SCNC: 145 MMOL/L (ref 136–145)
SPECIMEN EXP DATE BLD: NORMAL
STATUS OF UNIT,%ST: NORMAL
UNIT DIVISION, %UDIV: 0
VENTRICULAR RATE, ECG03: 83 BPM
VIT B12 SERPL-MCNC: 617 PG/ML (ref 211–911)
WBC # BLD AUTO: 8.9 K/UL (ref 3.6–11)

## 2017-11-22 PROCEDURE — 82746 ASSAY OF FOLIC ACID SERUM: CPT | Performed by: INTERNAL MEDICINE

## 2017-11-22 PROCEDURE — 85025 COMPLETE CBC W/AUTO DIFF WBC: CPT | Performed by: INTERNAL MEDICINE

## 2017-11-22 PROCEDURE — 82962 GLUCOSE BLOOD TEST: CPT

## 2017-11-22 PROCEDURE — 74011250637 HC RX REV CODE- 250/637: Performed by: INTERNAL MEDICINE

## 2017-11-22 PROCEDURE — 82607 VITAMIN B-12: CPT | Performed by: INTERNAL MEDICINE

## 2017-11-22 PROCEDURE — 94660 CPAP INITIATION&MGMT: CPT

## 2017-11-22 PROCEDURE — 74011000258 HC RX REV CODE- 258: Performed by: INTERNAL MEDICINE

## 2017-11-22 PROCEDURE — 74011250636 HC RX REV CODE- 250/636: Performed by: INTERNAL MEDICINE

## 2017-11-22 PROCEDURE — 83036 HEMOGLOBIN GLYCOSYLATED A1C: CPT | Performed by: INTERNAL MEDICINE

## 2017-11-22 PROCEDURE — 36415 COLL VENOUS BLD VENIPUNCTURE: CPT | Performed by: INTERNAL MEDICINE

## 2017-11-22 PROCEDURE — 77030013032 HC MSK BPAP/CPAP FISP -B

## 2017-11-22 PROCEDURE — 74011636637 HC RX REV CODE- 636/637: Performed by: INTERNAL MEDICINE

## 2017-11-22 PROCEDURE — 80048 BASIC METABOLIC PNL TOTAL CA: CPT | Performed by: INTERNAL MEDICINE

## 2017-11-22 PROCEDURE — 85610 PROTHROMBIN TIME: CPT | Performed by: INTERNAL MEDICINE

## 2017-11-22 RX ORDER — AMLODIPINE BESYLATE 5 MG/1
5 TABLET ORAL DAILY
Status: DISCONTINUED | OUTPATIENT
Start: 2017-11-22 | End: 2017-11-22 | Stop reason: HOSPADM

## 2017-11-22 RX ORDER — AMOXICILLIN 250 MG
1 CAPSULE ORAL DAILY
Status: DISCONTINUED | OUTPATIENT
Start: 2017-11-22 | End: 2017-11-22 | Stop reason: HOSPADM

## 2017-11-22 RX ORDER — AMOXICILLIN 250 MG
1 CAPSULE ORAL DAILY
Qty: 30 TAB | Refills: 0 | Status: SHIPPED | OUTPATIENT
Start: 2017-11-22 | End: 2018-05-30

## 2017-11-22 RX ORDER — POLYETHYLENE GLYCOL 3350 17 G/17G
17 POWDER, FOR SOLUTION ORAL ONCE
Status: COMPLETED | OUTPATIENT
Start: 2017-11-22 | End: 2017-11-22

## 2017-11-22 RX ORDER — AMLODIPINE BESYLATE 5 MG/1
5 TABLET ORAL DAILY
Qty: 30 TAB | Refills: 0 | Status: SHIPPED | OUTPATIENT
Start: 2017-11-22 | End: 2018-03-24 | Stop reason: SDUPTHER

## 2017-11-22 RX ORDER — FERROUS GLUCONATE 324(38)MG
324 TABLET ORAL
Qty: 30 TAB | Refills: 0 | Status: SHIPPED | OUTPATIENT
Start: 2017-11-22 | End: 2018-05-30

## 2017-11-22 RX ORDER — POLYETHYLENE GLYCOL 3350 17 G/17G
17 POWDER, FOR SOLUTION ORAL
Qty: 30 PACKET | Refills: 0 | Status: SHIPPED | OUTPATIENT
Start: 2017-11-22 | End: 2018-05-30

## 2017-11-22 RX ORDER — FERROUS GLUCONATE 324(38)MG
1 TABLET ORAL
Status: DISCONTINUED | OUTPATIENT
Start: 2017-11-22 | End: 2017-11-22 | Stop reason: HOSPADM

## 2017-11-22 RX ADMIN — CARVEDILOL 25 MG: 12.5 TABLET, FILM COATED ORAL at 08:01

## 2017-11-22 RX ADMIN — FERROUS GLUCONATE 1 TABLET: 324 TABLET ORAL at 12:06

## 2017-11-22 RX ADMIN — ERYTHROPOIETIN 11600 UNITS: 20000 INJECTION, SOLUTION INTRAVENOUS; SUBCUTANEOUS at 12:19

## 2017-11-22 RX ADMIN — CLONIDINE HYDROCHLORIDE 0.1 MG: 0.1 TABLET ORAL at 07:36

## 2017-11-22 RX ADMIN — Medication 10 ML: at 07:04

## 2017-11-22 RX ADMIN — IRON SUCROSE 100 MG: 20 INJECTION, SOLUTION INTRAVENOUS at 11:48

## 2017-11-22 RX ADMIN — POLYETHYLENE GLYCOL 3350 17 G: 17 POWDER, FOR SOLUTION ORAL at 09:08

## 2017-11-22 RX ADMIN — AMLODIPINE BESYLATE 5 MG: 5 TABLET ORAL at 09:08

## 2017-11-22 RX ADMIN — ASPIRIN 81 MG 81 MG: 81 TABLET ORAL at 08:08

## 2017-11-22 RX ADMIN — ISOSORBIDE MONONITRATE 120 MG: 30 TABLET, EXTENDED RELEASE ORAL at 08:08

## 2017-11-22 RX ADMIN — NITROGLYCERIN 0.4 MG: 0.4 TABLET SUBLINGUAL at 07:39

## 2017-11-22 RX ADMIN — ALLOPURINOL 100 MG: 100 TABLET ORAL at 08:01

## 2017-11-22 RX ADMIN — INSULIN LISPRO 2 UNITS: 100 INJECTION, SOLUTION INTRAVENOUS; SUBCUTANEOUS at 08:18

## 2017-11-22 RX ADMIN — NITROGLYCERIN 0.4 MG: 0.4 TABLET SUBLINGUAL at 00:15

## 2017-11-22 RX ADMIN — CALCITRIOL 0.25 MCG: 0.25 CAPSULE, LIQUID FILLED ORAL at 08:09

## 2017-11-22 RX ADMIN — DOCUSATE SODIUM AND SENNOSIDES 1 TABLET: 8.6; 5 TABLET, FILM COATED ORAL at 09:08

## 2017-11-22 RX ADMIN — NITROGLYCERIN 0.4 MG: 0.4 TABLET SUBLINGUAL at 00:20

## 2017-11-22 RX ADMIN — PANTOPRAZOLE SODIUM 40 MG: 40 TABLET, DELAYED RELEASE ORAL at 07:05

## 2017-11-22 RX ADMIN — BUMETANIDE 1 MG: 1 TABLET ORAL at 08:08

## 2017-11-22 NOTE — PHYSICIAN ADVISORY
Short Stay Review       Pt Name:  David Rabago   MR#  [id number]   CSN#   [id number]   Room and Hospital  ER20/20  @ [facility]   Hospitalization date  11/21/2017 12:16 PM   Current Attending Physician  Crystal Hernandez DO     A discharge order has been placed for this episode of hospital care for Ms. David Rabago; since this hospital stay is less than two midnights, I reviewed Ms. David Rabago chart. Ms. David Rabago healthcare insurance/benefit include:  [place]or: [place] / Plan: [place] MEDICARE PART A & B / Product Type: Medicare /     Utilization Review related clinical summary:   The pt is known to have complex medical history as described in H&P. She presented with severe symptomatic anemia and due to her existing clinical issues, high risk of deterioration prevailed around her care. ACS ruled out. She received ONE unit PRBC.      On the basis of chart review, this patient's hospitalization status      is appropriate for Ying Griffith MD MPH FACP   Physician Advisor    [address]   Utilization Review, Care Management         CSN:  [id number]   KELLY:   [id number]  Admitted on :  11/21/2017   Discharge order

## 2017-11-22 NOTE — NURSE NAVIGATOR
Met with patient and obtained Cardiomems reading. Patient states she had gotten her PO bumex 1 mg  this morning at 0800. After some technical difficulty, good waveform obtained. PAd reading of 5 obtained.

## 2017-11-22 NOTE — DISCHARGE SUMMARY
Physician Discharge Summary     Patient ID:  Gee Cooper  [id number]  51 y.o.  1955    Admit date: 11/21/2017    Discharge date and time: 11/22/2017    Admission Diagnoses: Chest pain  Chest pain    Discharge Diagnoses:    Principal Diagnosis   <principal problem not specified>                                             Other Diagnoses  Active Problems:    CAD (coronary Artery Disease) Native Artery (2/16/2011)      Overview: [place] 2004      1v CAD by cath - PCI OM with SAL      Cath 5/2004 - patent stent, no new disease      Lexiscan cardiolite 12/09- normal perfusion, EF 66%      Cath: 3/19/12: PA 55/30 mean 41, wedge 26, RA 24, EDP 35, LVG 55%, LM       normal, LAD normal, LCX - OM1 patent stent, OM2 prox 85% long, %       with L to R collaterals             JASEN on CPAP (2/16/2011)      Overview: Dr. Shayne Lopez CPAP      Pulmonary HTN (3/21/2012)      HTN (hypertension) (10/1/2012)      Morbid obesity (10/1/2012)      Esophageal reflux (10/1/2012)      DM (diabetes mellitus), type 2 with renal complications ([facility] [address] (2/0/3569)      DVT (deep venous thrombosis) ([facility] [address] (2/2/2017)      CKD (chronic kidney disease) stage 4, GFR 15-29 ml/min ([facility]) (2/10/2017)      COPD, severe ([address] (6/21/2017)      ILD (interstitial lung disease) ([facility] [address] (8/20/2017)      Chest pain (11/21/2017)         Hospital Course:     Ms. Curt Flores was admitted for chest pain and symptomatic anemia. Evaluated by cardiology who would like to hold on [facility] until anemia is improved. Anemia w/u is consistent with iron deficiency + anemia of chronic disease. Additionally, BP was not optimally controlled. We have re-started amlodipine to her regimen. We have also started iron therapy and she will need Nephrology follow-up for possible Procrit    PCP: Janeen Arambula DO    Consults: Cardiology and Nephrology    Significant Diagnostic Studies: See Hospital Course    Discharged home in improved condition.     Discharge Exam:    Visit Vitals    BP 168/85    Pulse 64    Temp 98.1 °F (36.7 °C)    Resp 14    Ht 5' 10\" (1.778 m)    Wt 154.2 kg (340 lb)    SpO2 99%    BMI 48.78 kg/m2     Physical Exam:    Gen: Well-developed, well-nourished, in no acute distress  HEENT:  Pink conjunctivae, hearing intact to voice, moist mucous membranes  Neck: Supple  Resp: No accessory muscle use, clear breath sounds without wheezes rales or rhonchi  Card: No murmurs, normal S1, S2 without thrills or peripheral edema  Abd:  Soft, non-tender, non-distended, normoactive bowel sounds are present  Musc: No cyanosis or clubbing  Skin: No rashes or ulcers, skin turgor is good  Neuro:  Cranial nerves are grossly intact,  strength is 5/5 bilaterally, hip flexion is 5/5 bilaterally, follows commands appropriately  Psych:  Good insight, oriented to person, place and time, alert      Disposition: home    Patient Instructions:   Current Discharge Medication List      START taking these medications    Details   amLODIPine (NORVASC) 5 mg tablet Take 1 Tab by mouth daily. Qty: 30 Tab, Refills: 0      ferrous gluconate 324 mg (38 mg iron) tablet Take 1 Tab by mouth daily (with breakfast). Qty: 30 Tab, Refills: 0      polyethylene glycol (MIRALAX) 17 gram packet Take 1 Packet by mouth daily as needed. For constipation if no BM in 48 hours  Qty: 30 Packet, Refills: 0      senna-docusate (PERICOLACE) 8.6-50 mg per tablet Take 1 Tab by mouth daily. Qty: 30 Tab, Refills: 0         CONTINUE these medications which have NOT CHANGED    Details   allopurinol (ZYLOPRIM) 100 mg tablet Take 100 mg by mouth daily. cetirizine (ZYRTEC) 10 mg tablet Take 10 mg by mouth daily as needed for Allergies. oxyCODONE-acetaminophen (PERCOCET) 5-325 mg per tablet Take 1 Tab by mouth daily. bumetanide (BUMEX) 0.5 mg tablet Take 1 mg by mouth daily. !! warfarin (COUMADIN) 2 mg tablet Take 2 mg by mouth Every Friday.       !! warfarin (COUMADIN) 3 mg tablet Take 3 mg by mouth six (6) days a week. Sat-Thurs      metOLazone (ZAROXOLYN) 5 mg tablet Take 5 mg by mouth daily as needed. albuterol (PROAIR HFA) 90 mcg/actuation inhaler Take 2 Puffs by inhalation every four (4) hours as needed for Wheezing. carvedilol (COREG) 25 mg tablet TAKE 1 TABLET BY MOUTH TWICE A DAY  Qty: 180 Tab, Refills: 3      isosorbide mononitrate ER (IMDUR) 120 mg CR tablet TAKE 1 TABLET BY MOUTH EVERY DAY  Qty: 90 Tab, Refills: 3      pantoprazole (PROTONIX) 40 mg tablet TAKE 1 TABLET BY MOUTH EVERY DAY FOR HEARTBURN  Qty: 90 Tab, Refills: 2      hydrOXYzine pamoate (VISTARIL) 25 mg capsule Take 1 Cap by mouth three (3) times daily as needed for Anxiety. Qty: 30 Cap, Refills: 1    Associated Diagnoses: Anxiety      albuterol (PROVENTIL VENTOLIN) 2.5 mg /3 mL (0.083 %) nebulizer solution 2.5 mg by Nebulization route every four (4) hours as needed. insulin aspart protamine/insulin aspart (NOVOLOG MIX 70-30) 100 unit/mL (70-30) injection by SubCUTAneous route three (3) times daily as needed. Uses sliding scale       ondansetron (ZOFRAN ODT) 4 mg disintegrating tablet Take 1 Tab by mouth every six (6) hours as needed for Nausea. Qty: 30 Tab, Refills: 0      fluticasone (FLONASE) 50 mcg/actuation nasal spray 2 Sprays by Both Nostrils route nightly as needed. calcitRIOL (ROCALTROL) 0.25 mcg capsule Take 0.25 mcg by mouth four (4) days a week. Mon, Wed, Fr, Sat      nitroglycerin (NITROSTAT) 0.3 mg SL tablet 1 Tab by SubLINGual route every five (5) minutes as needed for Chest Pain. Qty: 1 Bottle, Refills: 1    Associated Diagnoses: Atherosclerosis of native coronary artery of native heart with stable angina pectoris ([address] Angina, class III ([address]      ergocalciferol (VITAMIN D2) 50,000 unit capsule Take 50,000 Units by mouth every Tuesday and Thursday. aspirin 81 mg tablet Take 81 mg by mouth daily. atorvastatin (LIPITOR) 80 mg tablet Take 80 mg by mouth every evening.          !! - Potential duplicate medications found. Please discuss with provider.         Activity: Activity as tolerated  Diet: Resume previous diet  Wound Care: None needed    Follow-up with PCP in 1 week    Signed:  Sanam Copeland DO  11/22/2017  8:20 AM    Greater than 30 mins was spent in coordination, counseling, and execution of this patient's discharge

## 2017-11-22 NOTE — PROGRESS NOTES
Bedside and Verbal shift change report given to Karen Knowles (oncoming nurse) by Yolanda Degroot (offgoing nurse). Report included the following information SBAR, Kardex, ED Summary, Procedure Summary, Intake/Output, MAR, Accordion, Recent Results, Med Rec Status and Cardiac Rhythm NSR.

## 2017-11-22 NOTE — CONSULTS
[address] [facility] [place]  YOB: 1955          Assessment & Plan:   1. CKD 4  · Cr stable  · No HD needed  · Out ot CKD visit  2. Anemia  · IV Iron here and then we will arrange out opt Iron   · Once we have normal Iron, she will need epo  3. HTN  · Norvasc,Clonidine,Imdur  4. DM  · Insulin  5. ILD  6. Obesity       Subjective:   CC:ckd  HPI: Patient seen for CKD. She sees DR. Christensen at [facility]. She has CKD 4: cr 3-3.5 mg  She has anemia and only received one Epo in 2 2017. She denies bleeding. She has chronic edma: R> L: DVT in past.  She has obeisty/JASEN. She has PAD. She is eager to go home.   She DOES NOT want Epo here  ROS NEG GOT 10 SYS EXCEPT IN HPI  Current Facility-Administered Medications   Medication Dose Route Frequency    senna-docusate (PERICOLACE) 8.6-50 mg per tablet 1 Tab  1 Tab Oral DAILY    amLODIPine (NORVASC) tablet 5 mg  5 mg Oral DAILY    ferrous gluconate 324 mg (38 mg iron) tablet 1 Tab  1 Tab Oral DAILY WITH BREAKFAST    iron sucrose (VENOFER) 100 mg iron/5 mL injection 100 mg  100 mg IntraVENous ONCE    epoetin charlie (EPOGEN;PROCRIT) injection 11,600 Units  75 Units/kg IntraVENous ONCE    iron sucrose (VENOFER) 100 mg in 0.9% sodium chloride 100 mL IVPB  100 mg IntraVENous ONCE    sodium chloride (NS) flush 5-10 mL  5-10 mL IntraVENous Q8H    sodium chloride (NS) flush 5-10 mL  5-10 mL IntraVENous PRN    sodium chloride (NS) flush 5-10 mL  5-10 mL IntraVENous Q8H    sodium chloride (NS) flush 5-10 mL  5-10 mL IntraVENous PRN    0.9% sodium chloride infusion 250 mL  250 mL IntraVENous PRN    oxyCODONE-acetaminophen (PERCOCET) 5-325 mg per tablet 2 Tab  2 Tab Oral Q6H PRN    aspirin chewable tablet 81 mg  81 mg Oral DAILY    insulin lispro (HUMALOG) injection   SubCUTAneous AC&HS    glucose chewable tablet 16 g  4 Tab Oral PRN    dextrose (D50W) injection syrg 12.5-25 g  12.5-25 g IntraVENous PRN    glucagon (GLUCAGEN) injection 1 mg  1 mg IntraMUSCular PRN    hydrALAZINE (APRESOLINE) 20 mg/mL injection 10 mg  10 mg IntraVENous Q2H PRN    cloNIDine HCl (CATAPRES) tablet 0.1 mg  0.1 mg Oral Q6H PRN    Warfarin- Pharmacy Dosing   Other Rx Dosing/Monitoring    albuterol (PROVENTIL VENTOLIN) nebulizer solution 2.5 mg  2.5 mg Nebulization Q4H PRN    allopurinol (ZYLOPRIM) tablet 100 mg  100 mg Oral DAILY    atorvastatin (LIPITOR) tablet 80 mg  80 mg Oral QPM    bumetanide (BUMEX) tablet 1 mg  1 mg Oral DAILY    calcitRIOL (ROCALTROL) capsule 0.25 mcg  0.25 mcg Oral Once per day on Mon Wed Fri Sat    carvedilol (COREG) tablet 25 mg  25 mg Oral BID WITH MEALS    cetirizine (ZYRTEC) tablet 10 mg  10 mg Oral DAILY PRN    [START ON 11/23/2017] ergocalciferol (ERGOCALCIFEROL) capsule 50,000 Units  50,000 Units Oral Once per day on Tue Thu    fluticasone (FLONASE) 50 mcg/actuation nasal spray 2 Spray  2 Spray Both Nostrils QHS PRN    hydrOXYzine HCl (ATARAX) tablet 25 mg  25 mg Oral TID PRN    isosorbide mononitrate ER (IMDUR) tablet 120 mg  120 mg Oral DAILY    nitroglycerin (NITROSTAT) tablet 0.4 mg  0.4 mg SubLINGual Q5MIN PRN    oxyCODONE-acetaminophen (PERCOCET) 5-325 mg per tablet 1 Tab  1 Tab Oral DAILY    pantoprazole (PROTONIX) tablet 40 mg  40 mg Oral ACB     Current Outpatient Prescriptions   Medication Sig    amLODIPine (NORVASC) 5 mg tablet Take 1 Tab by mouth daily.  ferrous gluconate 324 mg (38 mg iron) tablet Take 1 Tab by mouth daily (with breakfast).  polyethylene glycol (MIRALAX) 17 gram packet Take 1 Packet by mouth daily as needed. For constipation if no BM in 48 hours    senna-docusate (PERICOLACE) 8.6-50 mg per tablet Take 1 Tab by mouth daily.  allopurinol (ZYLOPRIM) 100 mg tablet Take 100 mg by mouth daily.  cetirizine (ZYRTEC) 10 mg tablet Take 10 mg by mouth daily as needed for Allergies.     oxyCODONE-acetaminophen (PERCOCET) 5-325 mg per tablet Take 1 Tab by mouth daily.    bumetanide (BUMEX) 0.5 mg tablet Take 1 mg by mouth daily.  warfarin (COUMADIN) 2 mg tablet Take 2 mg by mouth Every Friday.  warfarin (COUMADIN) 3 mg tablet Take 3 mg by mouth six (6) days a week. Sat-Thurs    metOLazone (ZAROXOLYN) 5 mg tablet Take 5 mg by mouth daily as needed.  albuterol (PROAIR HFA) 90 mcg/actuation inhaler Take 2 Puffs by inhalation every four (4) hours as needed for Wheezing.  carvedilol (COREG) 25 mg tablet TAKE 1 TABLET BY MOUTH TWICE A DAY    isosorbide mononitrate ER (IMDUR) 120 mg CR tablet TAKE 1 TABLET BY MOUTH EVERY DAY    pantoprazole (PROTONIX) 40 mg tablet TAKE 1 TABLET BY MOUTH EVERY DAY FOR HEARTBURN    hydrOXYzine pamoate (VISTARIL) 25 mg capsule Take 1 Cap by mouth three (3) times daily as needed for Anxiety.  albuterol (PROVENTIL VENTOLIN) 2.5 mg /3 mL (0.083 %) nebulizer solution 2.5 mg by Nebulization route every four (4) hours as needed.  insulin aspart protamine/insulin aspart (NOVOLOG MIX 70-30) 100 unit/mL (70-30) injection by SubCUTAneous route three (3) times daily as needed. Uses sliding scale     ondansetron (ZOFRAN ODT) 4 mg disintegrating tablet Take 1 Tab by mouth every six (6) hours as needed for Nausea.  fluticasone (FLONASE) 50 mcg/actuation nasal spray 2 Sprays by Both Nostrils route nightly as needed.  calcitRIOL (ROCALTROL) 0.25 mcg capsule Take 0.25 mcg by mouth four (4) days a week. Mon, Wed, Fr, Sat    nitroglycerin (NITROSTAT) 0.3 mg SL tablet 1 Tab by SubLINGual route every five (5) minutes as needed for Chest Pain.  ergocalciferol (VITAMIN D2) 50,000 unit capsule Take 50,000 Units by mouth every Tuesday and Thursday.  aspirin 81 mg tablet Take 81 mg by mouth daily.  atorvastatin (LIPITOR) 80 mg tablet Take 80 mg by mouth every evening. Objective:     Vitals:  Blood pressure 159/77, pulse 83, temperature 98.1 °F (36.7 °C), resp.  rate 23, height 5' 10\" (1.778 m), weight 154.2 kg (340 lb), SpO2 96 %.  Temp (24hrs), Av.7 °F (37.1 °C), Min:98 °F (36.7 °C), Max:99.8 °F (37.7 °C)      Intake and Output:  701 - 1900  In: -   Out: 200 [Urine:200]  1901 - 700  In: 300 [P.O.:300]  Out: 1000 [Urine:1000]    Physical Exam:                Patient is intubated:  no    Physical Examination:   GENERAL ASSESSMENT: NAD  HEENT:Nontraumatic   CHEST: CTA  HEART: S1S2  ABDOMEN: Soft,NT,  :Myers: N  EXTREMITY: EDEMA 1  NEURO:Grossly non focal          ECG/rhythm:    Data Review      No results for input(s): TNIPOC in the last 72 hours. No lab exists for component: ITNL   Recent Labs      17   1606  17   1240   TROIQ  <0.04  <0.04     Recent Labs      17   0455  17   1606  17   1240   NA  145  146*  144   K  3.8  3.9  4.2   CL  108  109*  108   CO2  24  26  25   BUN  46*  44*  44*   CREA  3.12*  3.09*  3.24*   GLU  149*  111*  186*   CA  8.4*  8.1*  8.1*   ALB   --    --   3.2*   WBC  8.9  7.8  7.6   HGB  7.6*  6.8*  7.4*   HCT  23.5*  21.3*  23.3*   PLT  219  212  147*      Recent Labs      17   0455  17   1240   INR  3.2*  2.7*   PTP  33.0*  28.4*   APTT   --   29.9     Needs: urine analysis, urine sodium, protein and creatinine  Lab Results   Component Value Date/Time    Sodium urine, random 25 11/10/2014 12:40 PM    Creatinine, urine 145.29 2017 05:01 AM         Discussed with:  Colleague    : Jose Godinez MD  2017        [facility]:  [email or web address]  [facility] [place] office:  [address] [place] [address], [address] [place] [address] [address]  [place], [address] [facility]  Phone: [phone]  Fax :     [phone]    [facility] office:  [address] [facility], [address]  Phone - [phone]  Fax - [phone]

## 2017-11-22 NOTE — PROGRESS NOTES
[facility]: MED RECONCILIATION    Medications added:   · Metolazone    Medications removed:  · Colchrys- has none at home to use as needed  · Imipramine 25mg nightly/daily prn- pt not familiar with medication. Prescrbied by Dr Nasima Valle- pt informed of this to discuss in outpatient setting with provider. Cautioned against it's use given reduced renal function  · Novolog 70/30 mix    Medications adjusted:  · Allopurinol- previously 200mg daily. Pt reports taking 1 tab daily now  · Calcitriol- prevously stated taking Mon,Thurs but pt takes 4x/week on M,W,F,Sa  · Warfarin- previously 2mg on Mon but chagned to Fri.  3mg previously 6x/wk except mon  · Percocet- takes 1 5/325mg daily  · Humalog 70/30 mix- uses as needed but DM appears to be controlled with diet    Information obtained from: Patient (reliable historian), RxQuery, previous medical records      Allergies: Contrast dye [iodine]; Levaquin [levofloxacin]; and Morphine    Prior to Admission Medications:     Medication Documentation Review Audit       Reviewed by Cony Perez. James Yap (Pharmacist) on 11/21/17 at 1744         Medication Sig Documenting Provider Last Dose Status Taking? albuterol (PROAIR HFA) 90 mcg/actuation inhaler Take 2 Puffs by inhalation every four (4) hours as needed for Wheezing. Historical Provider  Active Yes    albuterol (PROVENTIL VENTOLIN) 2.5 mg /3 mL (0.083 %) nebulizer solution 2.5 mg by Nebulization route every four (4) hours as needed. Historical Provider  Active Yes             Med Note (Rubio Carmichael Nov 21, 2017  5:40 PM):        allopurinol (ZYLOPRIM) 100 mg tablet Take 100 mg by mouth daily. Historical Provider 11/21/2017 AM Active Yes    aspirin 81 mg tablet Take 81 mg by mouth daily. Abhijit Frey MD 11/21/2017 AM Active Yes    atorvastatin (LIPITOR) 80 mg tablet Take 80 mg by mouth every evening. Historical Provider 11/20/2017 PM Active Yes    bumetanide (BUMEX) 0.5 mg tablet Take 1 mg by mouth daily.  Historical Provider 11/21/2017 AM Active Yes    calcitRIOL (ROCALTROL) 0.25 mcg capsule Take 0.25 mcg by mouth four (4) days a week. Mon, Wed, Fr, Sat Historical Provider 11/20/2017 AM Active Yes    carvedilol (COREG) 25 mg tablet TAKE 1 TABLET BY MOUTH TWICE A DAY Cherelle Muñoz MD 11/21/2017 AM Active Yes    cetirizine (ZYRTEC) 10 mg tablet Take 10 mg by mouth daily as needed for Allergies. Historical Provider  Active Yes    ergocalciferol (VITAMIN D2) 50,000 unit capsule Take 50,000 Units by mouth every Tuesday and Thursday. Abhijit Frey MD 11/21/2017 AM Active Yes             Med Note Carlee Sifuentes Feb 2, 2017  5:13 PM): .      fluticasone (FLONASE) 50 mcg/actuation nasal spray 2 Sprays by Both Nostrils route nightly as needed. Historical Provider  Active Yes    hydrOXYzine pamoate (VISTARIL) 25 mg capsule Take 1 Cap by mouth three (3) times daily as needed for Anxiety. Hallie Levine DO  Active Yes    insulin aspart protamine/insulin aspart (NOVOLOG MIX 70-30) 100 unit/mL (70-30) injection by SubCUTAneous route three (3) times daily as needed. Uses sliding scale  Historical Provider 11/14/2017 Unknown time Active Yes             Med Note (Hernandez Perkins. Tue Nov 21, 2017  5:37 PM): Control DM with diet. May use insulin 1x/week       isosorbide mononitrate ER (IMDUR) 120 mg CR tablet TAKE 1 TABLET BY MOUTH EVERY DAY Cherelle Muñoz MD 11/21/2017 AM Active Yes    metOLazone (ZAROXOLYN) 5 mg tablet Take 5 mg by mouth daily as needed. Historical Provider  Active Yes    nitroglycerin (NITROSTAT) 0.3 mg SL tablet 1 Tab by SubLINGual route every five (5) minutes as needed for Chest Pain. Cherelle Muñoz MD  Active Yes             Med Note (Hernandez Perkins. Tue Feb 28, 2017  5:08 PM):        ondansetron (ZOFRAN ODT) 4 mg disintegrating tablet Take 1 Tab by mouth every six (6) hours as needed for Nausea.  Chloe Burroughs MD  Active Yes    oxyCODONE-acetaminophen (PERCOCET) 5-325 mg per tablet Take 1 Tab by mouth daily. Historical Provider 11/21/2017 AM Active Yes    pantoprazole (PROTONIX) 40 mg tablet TAKE 1 TABLET BY MOUTH EVERY DAY FOR HEARTBURN Maddison Andrade MD 11/21/2017 AM Active Yes    warfarin (COUMADIN) 2 mg tablet Take 2 mg by mouth Every Friday. Historical Provider 11/17/2017 AM Active Yes    warfarin (COUMADIN) 3 mg tablet Take 3 mg by mouth six (6) days a week. Sat-Thurs Historical Provider 11/21/2017 AM Active Yes                    Thank you,  Vivien Salas, Pharm. D.    s

## 2017-11-22 NOTE — PROGRESS NOTES
Spells of nitrate responsive chest pain during the night  BP up  Trops negative  Hgb still 7ish despite one unit PRBC  Chronic constipation - no bleeding    Suspect her angina due to anemia.   Would not pursue cath at present  Add amlodipine for BP and anti-ischemic benefit  Home today  Will arrange f/u in a month

## 2017-11-22 NOTE — ED NOTES
Per Leslee Pizarro MD patient is cleared for d/c post iron and procrit. Patient aware of follow up needs.

## 2017-11-22 NOTE — DISCHARGE INSTRUCTIONS
Patient Discharge Instructions    Yue Bolanos / [id number] : 1955    Admitted 2017 Discharged: 2017     Primary Diagnoses  Problem List as of 2017  Date Reviewed: 2017          Codes Class Noted - Resolved    Chest pain ICD-10-CM: R07.9  ICD-9-CM: 786.50  2017 - Present        Allergic reaction to contrast dye ICD-10-CM: T49.8O8N  ICD-9-CM: 995.27  2017 - Present        S/P left pulmonary artery pressure sensor implant placement ICD-10-CM: Z95.9  ICD-9-CM: V45.89  2017 - Present    Overview Signed 2017  9:53 AM by Gaaytri Dhillon RN     Cardiomems              ILD (interstitial lung disease) ([facility] ICD-10-CM: J84.9  ICD-9-CM: 211  2017 - Present        Warfarin anticoagulation ICD-10-CM: Z79.01  ICD-9-CM: V58.61  2017 - Present        COPD, severe ([facility] ICD-10-CM: J44.9  ICD-9-CM: 496  2017 - Present        CKD (chronic kidney disease) stage 4, GFR 15-29 ml/min (HCC) (Chronic) ICD-10-CM: N18.4  ICD-9-CM: 585.4  2/10/2017 - Present        DVT (deep venous thrombosis) ([facility] ICD-10-CM: I82.409  ICD-9-CM: 453.40  2017 - Present        DM (diabetes mellitus), type 2 with renal complications (HCC) (Chronic) ICD-10-CM: E11.29  ICD-9-CM: 250.40  2013 - Present        Vitamin D deficiency ICD-10-CM: E55.9  ICD-9-CM: 268.9  2013 - Present        Angina, class III ([facility] ICD-10-CM: I20.9  ICD-9-CM: 413.9  2013 - Present        Normocytic anemia (Chronic) ICD-10-CM: D64.9  ICD-9-CM: 285.9  2013 - Present        DJD (degenerative joint disease) of knee ICD-10-CM: M17.10  ICD-9-CM: 715.36  10/30/2012 - Present        Chronic diastolic heart failure (HCC) (Chronic) ICD-10-CM: I50.32  ICD-9-CM: 428.32  10/5/2012 - Present        HTN (hypertension) (Chronic) ICD-10-CM: I10  ICD-9-CM: 401.9  10/1/2012 - Present        Morbid obesity (Chronic) ICD-10-CM: E66.01  ICD-9-CM: 278.01  10/1/2012 - Present        Esophageal reflux ICD-10-CM: K21.9  ICD-9-CM: 530.81  10/1/2012 - Present        Gastroparesis ICD-10-CM: K31.84  ICD-9-CM: 536.3  10/1/2012 - Present        Pulmonary HTN (Chronic) ICD-10-CM: I27.20  ICD-9-CM: 416.8  3/21/2012 - Present        CAD (coronary Artery Disease) Native Artery (Chronic) ICD-10-CM: I25.10  ICD-9-CM: 414.01  2/16/2011 - Present    Overview Addendum 10/5/2012  7:33 AM by PRASHANT Kowalski     [place] 2004  1v CAD by cath - PCI OM with SAL  Cath 5/2004 - patent stent, no new disease  Lexiscan cardiolite 12/09- normal perfusion, EF 66%  Cath: 3/19/12: PA 55/30 mean 41, wedge 26, RA 24, EDP 35, LVG 55%, LM normal, LAD normal, LCX - OM1 patent stent, OM2 prox 85% long, % with L to R collaterals                JASEN on CPAP (Chronic) ICD-10-CM: G47.33, Z99.89  ICD-9-CM: 327.23, V46.8  2/16/2011 - Present    Overview Signed 3/21/2012  8:04 AM by PRASHANT Kowalski Dr. CPAP             RESOLVED: Decreased ambulation status ICD-10-CM: B43.36  ICD-9-CM: 780.99  6/21/2017 - 9/18/2017        RESOLVED: Acute exacerbation of chronic obstructive pulmonary disease (COPD) ([facility] ICD-10-CM: J44.1  ICD-9-CM: 491.21  3/1/2017 - 8/20/2017        RESOLVED: HCAP (healthcare-associated pneumonia) ICD-10-CM: J18.9  ICD-9-CM: 486  2/28/2017 - 8/20/2017        RESOLVED: CHF exacerbation ([facility] ICD-10-CM: I50.9  ICD-9-CM: 428.0  2/16/2017 - 3/2/2017        RESOLVED: Acute and chronic respiratory failure with hypoxia ([facility] ICD-10-CM: J96.21  ICD-9-CM: 518.84, 799.02  2/10/2017 - 8/20/2017        RESOLVED: Chest pain ICD-10-CM: R07.9  ICD-9-CM: 786.50  11/9/2014 - 11/18/2014        RESOLVED: Wheezing ICD-10-CM: R06.2  ICD-9-CM: 786.07  11/9/2014 - 11/18/2014        RESOLVED: Dyspnea ICD-10-CM: R06.00  ICD-9-CM: 786.09  11/9/2014 - 11/18/2014        RESOLVED: Tobacco abuse ICD-10-CM: Z72.0  ICD-9-CM: 305.1  11/9/2014 - 6/25/2015        RESOLVED: SIRS (systemic inflammatory response syndrome) (HCC) ICD-10-CM: R65.10  ICD-9-CM: 995.90 11/9/2014 - 11/18/2014        RESOLVED: Bacteremia ICD-10-CM: R78.81  ICD-9-CM: 790.7  10/14/2013 - 2/22/2014        RESOLVED: Acute pancreatitis ICD-10-CM: K85.90  ICD-9-CM: 180.3  10/10/2013 - 2/22/2014        RESOLVED: Abdominal pain ICD-10-CM: R10.9  ICD-9-CM: 789.00  10/10/2013 - 2/22/2014        RESOLVED: Pneumonia, organism unspecified(486) ICD-10-CM: J18.9  ICD-9-CM: 231  10/10/2013 - 2/22/2014        RESOLVED: Acute respiratory failure ([address] ICD-10-CM: J96.00  ICD-9-CM: 518.81  7/12/2013 - 7/17/2013        RESOLVED: Pneumonia, organism unspecified(486) ICD-10-CM: J18.9  ICD-9-CM: 486  7/12/2013 - 7/17/2013        RESOLVED: Chest pain, unspecified ICD-10-CM: R07.9  ICD-9-CM: 786.50  7/12/2013 - 7/17/2013        RESOLVED: Pneumonia, organism unspecified ICD-10-CM: J18.9  ICD-9-CM: 486  10/1/2012 - 10/30/2012        RESOLVED: JASEN (obstructive sleep apnea) ICD-10-CM: G47.33  ICD-9-CM: 327.23  10/1/2012 - 10/14/2016        RESOLVED: Leukocytosis, unspecified ICD-10-CM: K96.552  ICD-9-CM: 288.60  10/1/2012 - 10/30/2012        RESOLVED: Anemia, unspecified ICD-10-CM: D64.9  ICD-9-CM: 285.9  10/1/2012 - 10/30/2012        RESOLVED: ARF (acute renal failure) ICD-10-CM: N17.9  ICD-9-CM: 584.9  10/1/2012 - 10/30/2012        RESOLVED: Pulmonary edema ICD-10-CM: J81.1  ICD-9-CM: 547  10/1/2012 - 10/30/2012        RESOLVED: Tobacco use disorder (Chronic) ICD-10-CM: F17.200  ICD-9-CM: 305.1  3/21/2012 - 6/25/2015        RESOLVED: Interstitial lung disease ([facility] (Chronic) ICD-10-CM: J84.9  ICD-9-CM: 515  2/28/2011 - 9/18/2017              Take Home Medications       · It is important that you take the medication exactly as they are prescribed. · Keep your medication in the bottles provided by the pharmacist and keep a list of the medication names, dosages, and times to be taken in your wallet. · Do not take other medications without consulting your doctor.        What to do at Home    Recommended diet: Resume previous diet    Recommended activity: Activity as tolerated    If you experience sudden chest pain or shortness of breath not improved with rest, please follow up with nearest ER. Follow-up with your PCP in 1 week        Information obtained by :  I understand that if any problems occur once I am at home I am to contact my physician. I understand and acknowledge receipt of the instructions indicated above. Physician's or R.N.'s Signature                                                                  Date/Time                                                                                                                                              Patient or Representative Signature                                                          Date/Time       Anemia: Care Instructions  Your Care Instructions    Anemia is a low level of red blood cells, which carry oxygen throughout your body. Many things can cause anemia. Lack of iron is one of the most common causes. Your body needs iron to make hemoglobin, a substance in red blood cells that carries oxygen from the lungs to your body's cells. Without enough iron, the body produces fewer and smaller red blood cells. As a result, your body's cells do not get enough oxygen, and you feel tired and weak. And you may have trouble concentrating. Bleeding is the most common cause of a lack of iron. You may have heavy menstrual bleeding or bleeding caused by conditions such as ulcers, hemorrhoids, or cancer. Regular use of aspirin or other anti-inflammatory medicines (such as ibuprofen) also can cause bleeding in some people. A lack of iron in your diet also can cause anemia, especially at times when the body needs more iron, such as during pregnancy, infancy, and the teen years. Your doctor may have prescribed iron pills.  It may take several months of treatment for your iron levels to return to normal. Your doctor also may suggest that you eat foods that are rich in iron, such as meat and beans. There are many other causes of anemia. It is not always due to a lack of iron. Finding the specific cause of your anemia will help your doctor find the right treatment for you. Follow-up care is a key part of your treatment and safety. Be sure to make and go to all appointments, and call your doctor if you are having problems. It's also a good idea to know your test results and keep a list of the medicines you take. How can you care for yourself at home? · Take your medicines exactly as prescribed. Call your doctor if you think you are having a problem with your medicine. · If your doctor recommends iron pills, take them as directed:  ¨ Try to take the pills on an empty stomach about 1 hour before or 2 hours after meals. But you may need to take iron with food to avoid an upset stomach. ¨ Do not take antacids or drink milk or caffeine drinks (such as coffee, tea, or cola) at the same time or within 2 hours of the time that you take your iron. They can make it hard for your body to absorb the iron. ¨ Vitamin C (from food or supplements) helps your body absorb iron. Try taking iron pills with a glass of orange juice or some other food that is high in vitamin C, such as citrus fruits. ¨ Iron pills may cause stomach problems, such as heartburn, nausea, diarrhea, constipation, and cramps. Be sure to drink plenty of fluids, and include fruits, vegetables, and fiber in your diet each day. Iron pills often make your bowel movements dark or green. ¨ If you forget to take an iron pill, do not take a double dose of iron the next time you take a pill. ¨ Keep iron pills out of the reach of small children. An overdose of iron can be very dangerous. · Follow your doctor's advice about eating iron-rich foods.  These include red meat, shellfish, poultry, eggs, beans, raisins, whole-grain bread, and leafy green vegetables. · Steam vegetables to help them keep their iron content. When should you call for help? Call 911 anytime you think you may need emergency care. For example, call if:  ? · You have symptoms of a heart attack. These may include:  ¨ Chest pain or pressure, or a strange feeling in the chest.  ¨ Sweating. ¨ Shortness of breath. ¨ Nausea or vomiting. ¨ Pain, pressure, or a strange feeling in the back, neck, jaw, or upper belly or in one or both shoulders or arms. ¨ Lightheadedness or sudden weakness. ¨ A fast or irregular heartbeat. After you call 911, the  may tell you to chew 1 adult-strength or 2 to 4 low-dose aspirin. Wait for an ambulance. Do not try to drive yourself. ? · You passed out (lost consciousness). ?Call your doctor now or seek immediate medical care if:  ? · You have new or increased shortness of breath. ? · You are dizzy or lightheaded, or you feel like you may faint. ? · Your fatigue and weakness continue or get worse. ? · You have any abnormal bleeding, such as:  ¨ Nosebleeds. ¨ Vaginal bleeding that is different (heavier, more frequent, at a different time of the month) than what you are used to. ¨ Bloody or black stools, or rectal bleeding. ¨ Bloody or pink urine. ? Watch closely for changes in your health, and be sure to contact your doctor if:  ? · You do not get better as expected. Where can you learn more? Go to http://lindsay-kai.info/. Enter R301 in the search box to learn more about \"Anemia: Care Instructions. \"  Current as of: October 13, 2016  Content Version: 11.4  © 8081-1476 [employer]. Care instructions adapted under license by [employer] (which disclaims liability or warranty for this information).  If you have questions about a medical condition or this instruction, always ask your healthcare professional. [place] [address] any warranty or liability for your use of this information. Angina: Care Instructions  Your Care Instructions    You have a problem called angina. Angina happens when there is not enough blood flow to your heart muscle. Angina is a sign of coronary artery disease (CAD). CAD occurs when blood vessels that supply the heart become narrowed. Having CAD increases your risk of a heart attack. Chest pain or pressure is the most common symptom of angina. But some people have other symptoms, like:  · Pain, pressure, or a strange feeling in the back, neck, jaw, or upper belly, or in one or both shoulders or arms. · Shortness of breath. · Nausea or vomiting. · Lightheadedness or sudden weakness. · Fast or irregular heartbeat. Women are somewhat more likely than men to have angina symptoms like shortness of breath, nausea, and back or jaw pain. Angina can be dangerous. That's why it is important to pay attention to your symptoms. Know what is typical for you, learn how to control your symptoms, and understand when you need to get treatment. A change in your usual pattern of symptoms is an emergency. It may mean that you are having a heart attack. The doctor has checked you carefully, but problems can develop later. If you notice any problems or new symptoms, get medical treatment right away. Follow-up care is a key part of your treatment and safety. Be sure to make and go to all appointments, and call your doctor if you are having problems. It's also a good idea to know your test results and keep a list of the medicines you take. How can you care for yourself at home? Medicines  ? · If your doctor has given you nitroglycerin for angina symptoms, keep it with you at all times. If you have symptoms, sit down and rest, and take the first dose of nitroglycerin as directed. If your symptoms get worse or are not getting better within 5 minutes, call 911 right away. Stay on the phone.  The emergency  will give you further instructions. ? · If your doctor advises it, take 1 low-dose aspirin a day to prevent heart attack. ? · Be safe with medicines. Take your medicines exactly as prescribed. Call your doctor if you think you are having a problem with your medicine. You will get more details on the specific medicines your doctor prescribes. ? Lifestyle changes  ? · Do not smoke. If you need help quitting, talk to your doctor about stop-smoking programs and medicines. These can increase your chances of quitting for good. ? · Eat a heart-healthy diet that is low in saturated fat and salt, and is high in fiber. Talk to your doctor or a dietitian about healthy eating. ? · Stay at a healthy weight. Or lose weight if you need to. Activity  ? · Talk to your doctor about a level of activity that is safe for you. ? · If an activity causes angina symptoms, stop and rest.   When should you call for help? Call 911 anytime you think you may need emergency care. For example, call if:  ? · You passed out (lost consciousness). ? · You have symptoms of a heart attack. These may include:  ¨ Chest pain or pressure, or a strange feeling in the chest.  ¨ Sweating. ¨ Shortness of breath. ¨ Nausea or vomiting. ¨ Pain, pressure, or a strange feeling in the back, neck, jaw, or upper belly or in one or both shoulders or arms. ¨ Lightheadedness or sudden weakness. ¨ A fast or irregular heartbeat. After you call 911, the  may tell you to chew 1 adult-strength or 2 to 4 low-dose aspirin. Wait for an ambulance. Do not try to drive yourself. ? · You have angina symptoms that do not go away with rest or are not getting better within 5 minutes after you take a dose of nitroglycerin. ?Call your doctor now or seek immediate medical care if:  ? · You are having angina symptoms more often than usual, or they are different or worse than usual.   ? · You feel dizzy or lightheaded, or you feel like you may faint. ? Watch closely for changes in your health, and be sure to contact your doctor if you have any problems. Where can you learn more? Go to http://lindsay-kai.info/. Enter H129 in the search box to learn more about \"Angina: Care Instructions. \"  Current as of: September 21, 2016  Content Version: 11.4  © 4898-4102 [employer]. Care instructions adapted under license by [employer] (which disclaims liability or warranty for this information). If you have questions about a medical condition or this instruction, always ask your healthcare professional. [address] any warranty or liability for your use of this information.

## 2017-11-24 NOTE — PROGRESS NOTES
CAMILO Buckley notified of patient's d/c from [facility] ER as inpatient and need for f/u appt to be scheduled. / Olga Davalos Manager of Case Management

## 2017-11-29 ENCOUNTER — NURSE NAVIGATOR (OUTPATIENT)
Dept: CASE MANAGEMENT | Age: 62
End: 2017-11-29

## 2017-11-29 ENCOUNTER — OFFICE VISIT (OUTPATIENT)
Dept: FAMILY MEDICINE CLINIC | Age: 62
End: 2017-11-29

## 2017-11-29 ENCOUNTER — HOSPITAL ENCOUNTER (OUTPATIENT)
Dept: LAB | Age: 62
Discharge: HOME | End: 2017-11-29
Payer: MEDICARE

## 2017-11-29 VITALS
WEIGHT: 293 LBS | HEIGHT: 70 IN | OXYGEN SATURATION: 96 % | HEART RATE: 81 BPM | DIASTOLIC BLOOD PRESSURE: 72 MMHG | BODY MASS INDEX: 41.95 KG/M2 | RESPIRATION RATE: 18 BRPM | TEMPERATURE: 98.2 F | SYSTOLIC BLOOD PRESSURE: 180 MMHG

## 2017-11-29 DIAGNOSIS — D50.0 IRON DEFICIENCY ANEMIA DUE TO CHRONIC BLOOD LOSS: Primary | ICD-10-CM

## 2017-11-29 DIAGNOSIS — Z51.81 MEDICATION MONITORING ENCOUNTER: ICD-10-CM

## 2017-11-29 LAB
INR BLD: 2.6
PT POC: 31.4 SECONDS
VALID INTERNAL CONTROL?: YES

## 2017-11-29 PROCEDURE — 85025 COMPLETE CBC W/AUTO DIFF WBC: CPT

## 2017-11-29 NOTE — PROGRESS NOTES
Yue Bolanos is a 63 y.o. female   Chief Complaint   Patient presents with    Anemia    pt here for INR check and has been having nosebleeds. Pt had a blood transfusion last week due to her blood count being low 7 then was sent to ED and was in 6's and pt received 1 unit of prbc per opt. Pt had her inr checked and was elevated at 3 something, she can not remember. Pt is going to be starting procrit injections and is on iron pills. Pt feels exhausted and was sup[posed to have a cardiac cath but this has been delayed due to her acute anemia. she is a 63y.o. year old female who presents for evalution. Reviewed PmHx, RxHx, FmHx, SocHx, AllgHx and updated and dated in the chart. Review of Systems - negative except as listed above in the HPI    Objective:     Vitals:    11/29/17 1540   BP: 180/72   Pulse: 81   Resp: 18   Temp: 98.2 °F (36.8 °C)   TempSrc: Oral   SpO2: 96%   Weight: 342 lb (155.1 kg)   Height: 5' 10\" (1.778 m)       Current Outpatient Prescriptions   Medication Sig    amLODIPine (NORVASC) 5 mg tablet Take 1 Tab by mouth daily.  ferrous gluconate 324 mg (38 mg iron) tablet Take 1 Tab by mouth daily (with breakfast).  polyethylene glycol (MIRALAX) 17 gram packet Take 1 Packet by mouth daily as needed. For constipation if no BM in 48 hours    senna-docusate (PERICOLACE) 8.6-50 mg per tablet Take 1 Tab by mouth daily.  allopurinol (ZYLOPRIM) 100 mg tablet Take 100 mg by mouth daily.  cetirizine (ZYRTEC) 10 mg tablet Take 10 mg by mouth daily as needed for Allergies.  oxyCODONE-acetaminophen (PERCOCET) 5-325 mg per tablet Take 1 Tab by mouth daily.  bumetanide (BUMEX) 0.5 mg tablet Take 1 mg by mouth daily.  warfarin (COUMADIN) 2 mg tablet Take 2 mg by mouth Every Friday.  warfarin (COUMADIN) 3 mg tablet Take 3 mg by mouth six (6) days a week. Sat-Thurs    metOLazone (ZAROXOLYN) 5 mg tablet Take 5 mg by mouth daily as needed.     albuterol (PROAIR HFA) 90 mcg/actuation inhaler Take 2 Puffs by inhalation every four (4) hours as needed for Wheezing.  carvedilol (COREG) 25 mg tablet TAKE 1 TABLET BY MOUTH TWICE A DAY    isosorbide mononitrate ER (IMDUR) 120 mg CR tablet TAKE 1 TABLET BY MOUTH EVERY DAY    pantoprazole (PROTONIX) 40 mg tablet TAKE 1 TABLET BY MOUTH EVERY DAY FOR HEARTBURN    hydrOXYzine pamoate (VISTARIL) 25 mg capsule Take 1 Cap by mouth three (3) times daily as needed for Anxiety.  albuterol (PROVENTIL VENTOLIN) 2.5 mg /3 mL (0.083 %) nebulizer solution 2.5 mg by Nebulization route every four (4) hours as needed.  ondansetron (ZOFRAN ODT) 4 mg disintegrating tablet Take 1 Tab by mouth every six (6) hours as needed for Nausea.  fluticasone (FLONASE) 50 mcg/actuation nasal spray 2 Sprays by Both Nostrils route nightly as needed.  calcitRIOL (ROCALTROL) 0.25 mcg capsule Take 0.25 mcg by mouth four (4) days a week. Mon, Wed, Fr, Sat    nitroglycerin (NITROSTAT) 0.3 mg SL tablet 1 Tab by SubLINGual route every five (5) minutes as needed for Chest Pain.  ergocalciferol (VITAMIN D2) 50,000 unit capsule Take 50,000 Units by mouth every Tuesday and Thursday.  aspirin 81 mg tablet Take 81 mg by mouth daily.  atorvastatin (LIPITOR) 80 mg tablet Take 80 mg by mouth every evening.  insulin aspart protamine/insulin aspart (NOVOLOG MIX 70-30) 100 unit/mL (70-30) injection by SubCUTAneous route three (3) times daily as needed. Uses sliding scale      No current facility-administered medications for this visit. Physical Examination: General appearance - alert, well appearing, and in no distress  Chest - clear to auscultation, no wheezes, rales or rhonchi, symmetric air entry  Heart - normal rate, regular rhythm, normal S1, S2, no murmurs, rubs, clicks or gallops      Assessment/ Plan:   Diagnoses and all orders for this visit:    1. Iron deficiency anemia due to chronic blood loss  -     CBC WITH AUTOMATED DIFF    2.  Medication monitoring encounter  -     AMB POC PT/INR     c/w current coumadin dosing currently at 2.6 recheck 1 week  Follow-up Disposition:  Return if symptoms worsen or fail to improve. I have discussed the diagnosis with the patient and the intended plan as seen in the above orders. The patient has received an after-visit summary and questions were answered concerning future plans. Pt conveyed understanding of plan.     Medication Side Effects and Warnings were discussed with patient      Paul Palumbo, DO

## 2017-11-29 NOTE — PATIENT INSTRUCTIONS
Anemia: Care Instructions  Your Care Instructions    Anemia is a low level of red blood cells, which carry oxygen throughout your body. Many things can cause anemia. Lack of iron is one of the most common causes. Your body needs iron to make hemoglobin, a substance in red blood cells that carries oxygen from the lungs to your body's cells. Without enough iron, the body produces fewer and smaller red blood cells. As a result, your body's cells do not get enough oxygen, and you feel tired and weak. And you may have trouble concentrating. Bleeding is the most common cause of a lack of iron. You may have heavy menstrual bleeding or bleeding caused by conditions such as ulcers, hemorrhoids, or cancer. Regular use of aspirin or other anti-inflammatory medicines (such as ibuprofen) also can cause bleeding in some people. A lack of iron in your diet also can cause anemia, especially at times when the body needs more iron, such as during pregnancy, infancy, and the teen years. Your doctor may have prescribed iron pills. It may take several months of treatment for your iron levels to return to normal. Your doctor also may suggest that you eat foods that are rich in iron, such as meat and beans. There are many other causes of anemia. It is not always due to a lack of iron. Finding the specific cause of your anemia will help your doctor find the right treatment for you. Follow-up care is a key part of your treatment and safety. Be sure to make and go to all appointments, and call your doctor if you are having problems. It's also a good idea to know your test results and keep a list of the medicines you take. How can you care for yourself at home? · Take your medicines exactly as prescribed. Call your doctor if you think you are having a problem with your medicine. · If your doctor recommends iron pills, take them as directed:  ¨ Try to take the pills on an empty stomach about 1 hour before or 2 hours after meals.  But you may need to take iron with food to avoid an upset stomach. ¨ Do not take antacids or drink milk or caffeine drinks (such as coffee, tea, or cola) at the same time or within 2 hours of the time that you take your iron. They can make it hard for your body to absorb the iron. ¨ Vitamin C (from food or supplements) helps your body absorb iron. Try taking iron pills with a glass of orange juice or some other food that is high in vitamin C, such as citrus fruits. ¨ Iron pills may cause stomach problems, such as heartburn, nausea, diarrhea, constipation, and cramps. Be sure to drink plenty of fluids, and include fruits, vegetables, and fiber in your diet each day. Iron pills often make your bowel movements dark or green. ¨ If you forget to take an iron pill, do not take a double dose of iron the next time you take a pill. ¨ Keep iron pills out of the reach of small children. An overdose of iron can be very dangerous. · Follow your doctor's advice about eating iron-rich foods. These include red meat, shellfish, poultry, eggs, beans, raisins, whole-grain bread, and leafy green vegetables. · Steam vegetables to help them keep their iron content. When should you call for help? Call 911 anytime you think you may need emergency care. For example, call if:  ? · You have symptoms of a heart attack. These may include:  ¨ Chest pain or pressure, or a strange feeling in the chest.  ¨ Sweating. ¨ Shortness of breath. ¨ Nausea or vomiting. ¨ Pain, pressure, or a strange feeling in the back, neck, jaw, or upper belly or in one or both shoulders or arms. ¨ Lightheadedness or sudden weakness. ¨ A fast or irregular heartbeat. After you call 911, the  may tell you to chew 1 adult-strength or 2 to 4 low-dose aspirin. Wait for an ambulance. Do not try to drive yourself. ? · You passed out (lost consciousness).    ?Call your doctor now or seek immediate medical care if:  ? · You have new or increased shortness of breath. ? · You are dizzy or lightheaded, or you feel like you may faint. ? · Your fatigue and weakness continue or get worse. ? · You have any abnormal bleeding, such as:  ¨ Nosebleeds. ¨ Vaginal bleeding that is different (heavier, more frequent, at a different time of the month) than what you are used to. ¨ Bloody or black stools, or rectal bleeding. ¨ Bloody or pink urine. ? Watch closely for changes in your health, and be sure to contact your doctor if:  ? · You do not get better as expected. Where can you learn more? Go to http://lindsay-kai.info/. Enter R301 in the search box to learn more about \"Anemia: Care Instructions. \"  Current as of: October 13, 2016  Content Version: 11.4  © 4537-5409 [employer]. Care instructions adapted under license by [employer] (which disclaims liability or warranty for this information). If you have questions about a medical condition or this instruction, always ask your healthcare professional. [place] [address] any warranty or liability for your use of this information.

## 2017-11-30 ENCOUNTER — CLINICAL SUPPORT (OUTPATIENT)
Dept: CARDIOLOGY CLINIC | Age: 62
End: 2017-11-30

## 2017-11-30 DIAGNOSIS — I27.20 PULMONARY HTN (HCC): Chronic | ICD-10-CM

## 2017-11-30 DIAGNOSIS — E11.22 TYPE 2 DIABETES MELLITUS WITH STAGE 4 CHRONIC KIDNEY DISEASE, WITH LONG-TERM CURRENT USE OF INSULIN (HCC): Chronic | ICD-10-CM

## 2017-11-30 DIAGNOSIS — J84.9 ILD (INTERSTITIAL LUNG DISEASE) (HCC): ICD-10-CM

## 2017-11-30 DIAGNOSIS — N18.4 TYPE 2 DIABETES MELLITUS WITH STAGE 4 CHRONIC KIDNEY DISEASE, WITH LONG-TERM CURRENT USE OF INSULIN (HCC): Chronic | ICD-10-CM

## 2017-11-30 DIAGNOSIS — I10 ESSENTIAL HYPERTENSION: Chronic | ICD-10-CM

## 2017-11-30 DIAGNOSIS — Z79.4 TYPE 2 DIABETES MELLITUS WITH STAGE 4 CHRONIC KIDNEY DISEASE, WITH LONG-TERM CURRENT USE OF INSULIN (HCC): Chronic | ICD-10-CM

## 2017-11-30 DIAGNOSIS — I25.118 ATHEROSCLEROSIS OF NATIVE CORONARY ARTERY OF NATIVE HEART WITH STABLE ANGINA PECTORIS (HCC): Primary | Chronic | ICD-10-CM

## 2017-11-30 DIAGNOSIS — I20.9 ANGINA, CLASS III (HCC): ICD-10-CM

## 2017-11-30 DIAGNOSIS — Z95.9 S/P LEFT PULMONARY ARTERY PRESSURE SENSOR IMPLANT PLACEMENT: ICD-10-CM

## 2017-11-30 DIAGNOSIS — G47.33 OSA ON CPAP: Chronic | ICD-10-CM

## 2017-11-30 DIAGNOSIS — I82.5Z1 CHRONIC DEEP VEIN THROMBOSIS (DVT) OF DISTAL VEIN OF RIGHT LOWER EXTREMITY (HCC): ICD-10-CM

## 2017-11-30 DIAGNOSIS — Z99.89 OSA ON CPAP: Chronic | ICD-10-CM

## 2017-11-30 DIAGNOSIS — N18.4 CKD (CHRONIC KIDNEY DISEASE) STAGE 4, GFR 15-29 ML/MIN (HCC): Chronic | ICD-10-CM

## 2017-11-30 DIAGNOSIS — I50.32 CHRONIC DIASTOLIC HEART FAILURE (HCC): Chronic | ICD-10-CM

## 2017-11-30 DIAGNOSIS — J44.9 COPD, SEVERE (HCC): ICD-10-CM

## 2017-11-30 DIAGNOSIS — Z79.01 WARFARIN ANTICOAGULATION: ICD-10-CM

## 2017-11-30 DIAGNOSIS — E66.01 MORBID OBESITY (HCC): Chronic | ICD-10-CM

## 2017-11-30 LAB
BASOPHILS # BLD AUTO: 0 X10E3/UL (ref 0–0.2)
BASOPHILS NFR BLD AUTO: 0 %
EOSINOPHIL # BLD AUTO: 0.4 X10E3/UL (ref 0–0.4)
EOSINOPHIL NFR BLD AUTO: 5 %
ERYTHROCYTE [DISTWIDTH] IN BLOOD BY AUTOMATED COUNT: 15.3 % (ref 12.3–15.4)
HCT VFR BLD AUTO: 24.3 % (ref 34–46.6)
HGB BLD-MCNC: 7.5 G/DL (ref 11.1–15.9)
IMM GRANULOCYTES # BLD: 0 X10E3/UL (ref 0–0.1)
IMM GRANULOCYTES NFR BLD: 0 %
LYMPHOCYTES # BLD AUTO: 1.6 X10E3/UL (ref 0.7–3.1)
LYMPHOCYTES NFR BLD AUTO: 21 %
MCH RBC QN AUTO: 29.8 PG (ref 26.6–33)
MCHC RBC AUTO-ENTMCNC: 30.9 G/DL (ref 31.5–35.7)
MCV RBC AUTO: 96 FL (ref 79–97)
MONOCYTES # BLD AUTO: 0.4 X10E3/UL (ref 0.1–0.9)
MONOCYTES NFR BLD AUTO: 6 %
NEUTROPHILS # BLD AUTO: 5.3 X10E3/UL (ref 1.4–7)
NEUTROPHILS NFR BLD AUTO: 68 %
PLATELET # BLD AUTO: 208 X10E3/UL (ref 150–379)
RBC # BLD AUTO: 2.52 X10E6/UL (ref 3.77–5.28)
WBC # BLD AUTO: 7.8 X10E3/UL (ref 3.4–10.8)

## 2017-11-30 NOTE — PROGRESS NOTES
Per Dr. Loan Shirley, utilize Definity. ID verified per protocol. Test and risks explained to patient. Consent signed after all questions answered. Started saline lock #22 gauge in Right ACF. 2sticks. Good blood return and flushed without difficulty. At 4:15 pm, activated Definity (1.3 mL in 8.7 ml NS) injected  X 2. (Total 5.4 mLs given). No complaint of voiced. Echo images obtained. Removed saline lock and applied pressure until homeostasis achieved. Dressing applied. Patient instructed to leave dressing on times 1 hr. Verbalized understanding. Patient waited in echo room until echocardiogram completed. Patient left office without complaints of voiced.

## 2017-11-30 NOTE — PROGRESS NOTES
Your hemoglobin remains low at 7.5, we need to recheck this next week, make sure yoyu are taking the iron pills. If your fatigue starts getting worse or are getting chest heaviness or pain go to ED.

## 2017-12-01 NOTE — PROGRESS NOTES
Patient id x 3, notified and verbalized understanding. Pt states that she already has an appt for next week.

## 2017-12-06 ENCOUNTER — OFFICE VISIT (OUTPATIENT)
Dept: FAMILY MEDICINE CLINIC | Age: 62
End: 2017-12-06

## 2017-12-06 ENCOUNTER — NURSE NAVIGATOR (OUTPATIENT)
Dept: CASE MANAGEMENT | Age: 62
End: 2017-12-06

## 2017-12-06 ENCOUNTER — HOSPITAL ENCOUNTER (OUTPATIENT)
Dept: LAB | Age: 62
Discharge: HOME | End: 2017-12-06
Payer: MEDICARE

## 2017-12-06 ENCOUNTER — TELEPHONE (OUTPATIENT)
Dept: CARDIOLOGY CLINIC | Age: 62
End: 2017-12-06

## 2017-12-06 VITALS
TEMPERATURE: 97.8 F | HEART RATE: 67 BPM | RESPIRATION RATE: 18 BRPM | SYSTOLIC BLOOD PRESSURE: 118 MMHG | WEIGHT: 293 LBS | HEIGHT: 70 IN | BODY MASS INDEX: 41.95 KG/M2 | OXYGEN SATURATION: 96 % | DIASTOLIC BLOOD PRESSURE: 62 MMHG

## 2017-12-06 DIAGNOSIS — D68.9 COAGULATION DISORDER (HCC): Primary | ICD-10-CM

## 2017-12-06 DIAGNOSIS — D64.9 SEVERE ANEMIA: ICD-10-CM

## 2017-12-06 LAB
INR BLD: 3.2
PT POC: NORMAL SECONDS
VALID INTERNAL CONTROL?: YES

## 2017-12-06 PROCEDURE — 85025 COMPLETE CBC W/AUTO DIFF WBC: CPT

## 2017-12-06 RX ORDER — WARFARIN 3 MG/1
3 TABLET ORAL
Qty: 30 TAB | Refills: 1
Start: 2017-12-06 | End: 2018-04-13 | Stop reason: SDUPTHER

## 2017-12-06 RX ORDER — WARFARIN 2 MG/1
2 TABLET ORAL
Qty: 30 TAB | Refills: 1
Start: 2017-12-08 | End: 2018-04-13 | Stop reason: SDUPTHER

## 2017-12-06 NOTE — TELEPHONE ENCOUNTER
Patient states her shortness of breath has worsened. She is having intermittent chest pain and palpitations. She feels worst than when she was here in 11/20/17. She is currently taking 38mg iron tablets and is getting procit injections. Last labs 11/29/17. Hgb 7.5. She has an appointment today with her PCP to recheck. Patient's weight today is 344lbs. She states this is up 6 lbs from last Wednesday. She has a pulse ox at home. -140s with minimal activity. O2 drops to 80s when this happens. She also has palpitations with elevated HRs. Abdomen is still distended. Confirmed that she takes bumex 1mg daily. She is lightheaded at times but does not feel thirsty and her urine is not concentrated. She states she still uses the restroom several times a day. Per MD, will increase bumex to 2mg BID for 3 doses.

## 2017-12-06 NOTE — PROGRESS NOTES
Blaise Leonardo is a 63 y.o. female   Chief Complaint   Patient presents with    Coagulation disorder    Labs    pt here for recheck of her INR which has proven very difficult to control. Currently 3.2. Pt is going to be starting procrit next month. Pt is wanting to have her cath since she is having continued sob with exertion. Pt is waiting to hear back from cardiology. she is a 63y.o. year old female who presents for evalution. Reviewed PmHx, RxHx, FmHx, SocHx, AllgHx and updated and dated in the chart. Review of Systems - negative except as listed above in the HPI    Objective:     Vitals:    12/06/17 1426   BP: 118/62   Pulse: 67   Resp: 18   Temp: 97.8 °F (36.6 °C)   TempSrc: Oral   SpO2: 96%   Weight: 344 lb (156 kg)   Height: 5' 10\" (1.778 m)       Current Outpatient Prescriptions   Medication Sig    [START ON 12/8/2017] warfarin (COUMADIN) 2 mg tablet Take 1 Tab by mouth Every Friday. And saturday    warfarin (COUMADIN) 3 mg tablet Take 1 Tab by mouth five (5) days a week. Sun-Thurs    amLODIPine (NORVASC) 5 mg tablet Take 1 Tab by mouth daily.  ferrous gluconate 324 mg (38 mg iron) tablet Take 1 Tab by mouth daily (with breakfast).  polyethylene glycol (MIRALAX) 17 gram packet Take 1 Packet by mouth daily as needed. For constipation if no BM in 48 hours    senna-docusate (PERICOLACE) 8.6-50 mg per tablet Take 1 Tab by mouth daily.  allopurinol (ZYLOPRIM) 100 mg tablet Take 100 mg by mouth daily.  cetirizine (ZYRTEC) 10 mg tablet Take 10 mg by mouth daily as needed for Allergies.  oxyCODONE-acetaminophen (PERCOCET) 5-325 mg per tablet Take 1 Tab by mouth daily.  bumetanide (BUMEX) 0.5 mg tablet Take 1 mg by mouth daily.  metOLazone (ZAROXOLYN) 5 mg tablet Take 5 mg by mouth daily as needed.  albuterol (PROAIR HFA) 90 mcg/actuation inhaler Take 2 Puffs by inhalation every four (4) hours as needed for Wheezing.     carvedilol (COREG) 25 mg tablet TAKE 1 TABLET BY MOUTH TWICE A DAY    isosorbide mononitrate ER (IMDUR) 120 mg CR tablet TAKE 1 TABLET BY MOUTH EVERY DAY    pantoprazole (PROTONIX) 40 mg tablet TAKE 1 TABLET BY MOUTH EVERY DAY FOR HEARTBURN    hydrOXYzine pamoate (VISTARIL) 25 mg capsule Take 1 Cap by mouth three (3) times daily as needed for Anxiety.  albuterol (PROVENTIL VENTOLIN) 2.5 mg /3 mL (0.083 %) nebulizer solution 2.5 mg by Nebulization route every four (4) hours as needed.  insulin aspart protamine/insulin aspart (NOVOLOG MIX 70-30) 100 unit/mL (70-30) injection by SubCUTAneous route three (3) times daily as needed. Uses sliding scale     ondansetron (ZOFRAN ODT) 4 mg disintegrating tablet Take 1 Tab by mouth every six (6) hours as needed for Nausea.  fluticasone (FLONASE) 50 mcg/actuation nasal spray 2 Sprays by Both Nostrils route nightly as needed.  calcitRIOL (ROCALTROL) 0.25 mcg capsule Take 0.25 mcg by mouth four (4) days a week. Mon, Wed, Fr, Sat    nitroglycerin (NITROSTAT) 0.3 mg SL tablet 1 Tab by SubLINGual route every five (5) minutes as needed for Chest Pain.  ergocalciferol (VITAMIN D2) 50,000 unit capsule Take 50,000 Units by mouth every Tuesday and Thursday.  aspirin 81 mg tablet Take 81 mg by mouth daily.  atorvastatin (LIPITOR) 80 mg tablet Take 80 mg by mouth every evening. No current facility-administered medications for this visit. Physical Examination: General appearance - alert, well appearing, and in no distress  Chest - clear to auscultation, no wheezes, rales or rhonchi, symmetric air entry  Heart - normal rate, regular rhythm, normal S1, S2, no murmurs, rubs, clicks or gallops      Assessment/ Plan:   Diagnoses and all orders for this visit:    1. Coagulation disorder (HCC)  -     AMB POC PT/INR  -     warfarin (COUMADIN) 2 mg tablet; Take 1 Tab by mouth Every Friday. And saturday  -     warfarin (COUMADIN) 3 mg tablet; Take 1 Tab by mouth five (5) days a week. Sun-Thurs    2. Severe anemia  -     CBC WITH AUTOMATED DIFF       Follow-up Disposition:  Return in about 10 days (around 12/16/2017), or if symptoms worsen or fail to improve. I have discussed the diagnosis with the patient and the intended plan as seen in the above orders. The patient has received an after-visit summary and questions were answered concerning future plans. Pt conveyed understanding of plan.     Medication Side Effects and Warnings were discussed with patient      Joselito Carpenter DO

## 2017-12-07 ENCOUNTER — TELEPHONE (OUTPATIENT)
Dept: CARDIOLOGY CLINIC | Age: 62
End: 2017-12-07

## 2017-12-07 LAB
BASOPHILS # BLD AUTO: 0 X10E3/UL (ref 0–0.2)
BASOPHILS NFR BLD AUTO: 0 %
EOSINOPHIL # BLD AUTO: 0.3 X10E3/UL (ref 0–0.4)
EOSINOPHIL NFR BLD AUTO: 5 %
ERYTHROCYTE [DISTWIDTH] IN BLOOD BY AUTOMATED COUNT: 14.9 % (ref 12.3–15.4)
HCT VFR BLD AUTO: 22.9 % (ref 34–46.6)
HGB BLD-MCNC: 7.4 G/DL (ref 11.1–15.9)
IMM GRANULOCYTES # BLD: 0 X10E3/UL (ref 0–0.1)
IMM GRANULOCYTES NFR BLD: 0 %
LYMPHOCYTES # BLD AUTO: 1.5 X10E3/UL (ref 0.7–3.1)
LYMPHOCYTES NFR BLD AUTO: 21 %
MCH RBC QN AUTO: 30 PG (ref 26.6–33)
MCHC RBC AUTO-ENTMCNC: 32.3 G/DL (ref 31.5–35.7)
MCV RBC AUTO: 93 FL (ref 79–97)
MONOCYTES # BLD AUTO: 0.5 X10E3/UL (ref 0.1–0.9)
MONOCYTES NFR BLD AUTO: 7 %
NEUTROPHILS # BLD AUTO: 4.7 X10E3/UL (ref 1.4–7)
NEUTROPHILS NFR BLD AUTO: 67 %
PLATELET # BLD AUTO: 195 X10E3/UL (ref 150–379)
RBC # BLD AUTO: 2.47 X10E6/UL (ref 3.77–5.28)
WBC # BLD AUTO: 7 X10E3/UL (ref 3.4–10.8)

## 2017-12-07 NOTE — PROGRESS NOTES
Your hgb has dropped slightly to 7.4 from 7.5 please folow up with your kidney doctor about the epogen injections

## 2017-12-08 NOTE — TELEPHONE ENCOUNTER
Spoke with Ms. Rosemary Newman this morning following increasing her diuretics yesterday to 2 mg BID to address worsening shortness of breath and weight gain. She reports an 8 pound weight loss over night. SOB/LARA is essentially unchanged. She denies any lightheadedness or dizziness. Elevated HR with exertion is unchanged. Intermittent chest pressure with exertion is unchanged. She reports staying well hydrated despite a lack of appetite. She denies any nausea or vomiting. No abdominal pain. She and I discussed that she was seen by Dr. Hunter Mariee yesterday and that she had repeat lab work - Hgb remains very low. She reports that she is scheduled to begin Epogen on 12/11 and then Procrit the week after that. Plan to have her return to the office, in the near future, to obtain further data about her PA pressures by Echo. I have advised her to continue Bumex 2 mg BID, as advised by Dr. Simón Maloney yesterday, through tomorrow morning. I will plan to call her again at that time to discuss her symptoms. I have advised her that is any of her symptoms, dyspnea, tachycardia, chest pain, etc were to escalate or not resolve with rest, that we would recommend that she seek immediate medical attention.

## 2017-12-08 NOTE — TELEPHONE ENCOUNTER
Spoke with Ms. Arturo Hart again today. Her weight has trended down 1 additional pounds (now 9 pounds down with increased Bumex dose). Her shortness of breath, palpitations and tachycardia are unchanged. She will continue with BID dosing for 1 additional day. For palpitations, I am going to ask our staff to see if we can add a potassium level and renal function to lab work that was drawn 2 days ago by Dr. Heron Nuñez office. She has a history of hypokalemia and with increased diuresis, I am concerned that his may explain her palpitations. I again reminded her that is any of her symptoms were to progress, that she should seem immediate medical attention. I also reminded her that our office has an on call provider available over the weekend for less urgent issues. I will plan to phone follow up again with her on Monday.

## 2017-12-11 ENCOUNTER — TELEPHONE (OUTPATIENT)
Dept: CARDIOLOGY CLINIC | Age: 62
End: 2017-12-11

## 2017-12-11 NOTE — TELEPHONE ENCOUNTER
Confirmed that patient took bumex 2mg BID until Sunday night. She took bumex 1mg this morning. SOB is unchanged. She has not weighed herself over the weekend or today. She continues to deny lightheadedness or dizziness. She is currently at [facility] for an Epogen infusion.

## 2017-12-12 ENCOUNTER — NURSE NAVIGATOR (OUTPATIENT)
Dept: CASE MANAGEMENT | Age: 62
End: 2017-12-12

## 2017-12-12 DIAGNOSIS — M10.379 ACUTE GOUT DUE TO RENAL IMPAIRMENT INVOLVING FOOT, UNSPECIFIED LATERALITY: ICD-10-CM

## 2017-12-12 DIAGNOSIS — I82.4Y9 DEEP VEIN THROMBOSIS (DVT) OF PROXIMAL LOWER EXTREMITY, UNSPECIFIED CHRONICITY, UNSPECIFIED LATERALITY (HCC): ICD-10-CM

## 2017-12-12 RX ORDER — OXYCODONE AND ACETAMINOPHEN 5; 325 MG/1; MG/1
TABLET ORAL
Qty: 20 TAB | Refills: 0 | Status: SHIPPED | OUTPATIENT
Start: 2017-12-12 | End: 2017-12-27 | Stop reason: SDUPTHER

## 2017-12-12 NOTE — TELEPHONE ENCOUNTER
Weight today is 329lbs. She is 7 pounds down from Friday. Shortness of breath is not as bad as the last two days. She is still having palpitations. Testing scheduled for tomorrow. Will obtain vitals at that visit. She is taking bumex 1mg daily.

## 2017-12-13 ENCOUNTER — CLINICAL SUPPORT (OUTPATIENT)
Dept: CARDIOLOGY CLINIC | Age: 62
End: 2017-12-13

## 2017-12-13 ENCOUNTER — NURSE NAVIGATOR (OUTPATIENT)
Dept: CASE MANAGEMENT | Age: 62
End: 2017-12-13

## 2017-12-13 DIAGNOSIS — R06.02 SOB (SHORTNESS OF BREATH): Primary | ICD-10-CM

## 2017-12-13 NOTE — NURSE NAVIGATOR
Cardiomems reading obtained in conjunction with an Echocardiogram in [facility] office. PA mean transmitted at that time by Cardiomems was 16mmHg, Echo reading correlating with PAd was 13. Dr Tyler Bateman changed the parameters to monitor the PA mean instead of PA diastolic with threshold set at 10 to 15.

## 2017-12-19 ENCOUNTER — OFFICE VISIT (OUTPATIENT)
Dept: FAMILY MEDICINE CLINIC | Age: 62
End: 2017-12-19

## 2017-12-19 VITALS
HEIGHT: 70 IN | DIASTOLIC BLOOD PRESSURE: 66 MMHG | TEMPERATURE: 98.4 F | WEIGHT: 293 LBS | OXYGEN SATURATION: 98 % | HEART RATE: 85 BPM | SYSTOLIC BLOOD PRESSURE: 138 MMHG | RESPIRATION RATE: 12 BRPM | BODY MASS INDEX: 41.95 KG/M2

## 2017-12-19 DIAGNOSIS — Z51.81 MEDICATION MONITORING ENCOUNTER: ICD-10-CM

## 2017-12-19 DIAGNOSIS — I82.5Z1 CHRONIC DEEP VEIN THROMBOSIS (DVT) OF DISTAL VEIN OF RIGHT LOWER EXTREMITY (HCC): Primary | ICD-10-CM

## 2017-12-19 LAB
INR BLD: 2.6
PT POC: NORMAL SECONDS
VALID INTERNAL CONTROL?: YES

## 2017-12-19 NOTE — PROGRESS NOTES
Nadiya Dhillon is a 63 y.o. female   Chief Complaint   Patient presents with    Coagulation disorder    pt for INR check and currently at 2.1 will c/w current med dosing [facility] No bleeding [id number]. Pt has received 2 iron infusions and will be seeing cardio again this Friday. she is a 63y.o. year old female who presents for evalution. Reviewed PmHx, RxHx, FmHx, SocHx, AllgHx and updated and dated in the chart. Review of Systems - negative except as listed above in the HPI    Objective:     Vitals:    12/19/17 1320   BP: 138/66   Pulse: 85   Resp: 12   Temp: 98.4 °F (36.9 °C)   SpO2: 98%   Weight: 337 lb (152.9 kg)   Height: 5' 10\" (1.778 m)       Current Outpatient Prescriptions   Medication Sig    oxyCODONE-acetaminophen (PERCOCET) 5-325 mg per tablet TAKE 1 TABLET BY MOUTH EVERY 6 HOURS AS NEEDED FOR PAIN, MAX DAILY AMOUNT OF 4 TABLETS    warfarin (COUMADIN) 2 mg tablet Take 1 Tab by mouth Every Friday. And saturday    warfarin (COUMADIN) 3 mg tablet Take 1 Tab by mouth five (5) days a week. Sun-Thurs    amLODIPine (NORVASC) 5 mg tablet Take 1 Tab by mouth daily.  ferrous gluconate 324 mg (38 mg iron) tablet Take 1 Tab by mouth daily (with breakfast).  polyethylene glycol (MIRALAX) 17 gram packet Take 1 Packet by mouth daily as needed. For constipation if no BM in 48 hours    senna-docusate (PERICOLACE) 8.6-50 mg per tablet Take 1 Tab by mouth daily.  allopurinol (ZYLOPRIM) 100 mg tablet Take 100 mg by mouth daily.  cetirizine (ZYRTEC) 10 mg tablet Take 10 mg by mouth daily as needed for Allergies.  oxyCODONE-acetaminophen (PERCOCET) 5-325 mg per tablet Take 1 Tab by mouth daily.  bumetanide (BUMEX) 0.5 mg tablet Take 1 mg by mouth daily.  metOLazone (ZAROXOLYN) 5 mg tablet Take 5 mg by mouth daily as needed.  albuterol (PROAIR HFA) 90 mcg/actuation inhaler Take 2 Puffs by inhalation every four (4) hours as needed for Wheezing.     carvedilol (COREG) 25 mg tablet TAKE 1 TABLET BY MOUTH TWICE A DAY    isosorbide mononitrate ER (IMDUR) 120 mg CR tablet TAKE 1 TABLET BY MOUTH EVERY DAY    pantoprazole (PROTONIX) 40 mg tablet TAKE 1 TABLET BY MOUTH EVERY DAY FOR HEARTBURN    hydrOXYzine pamoate (VISTARIL) 25 mg capsule Take 1 Cap by mouth three (3) times daily as needed for Anxiety.  albuterol (PROVENTIL VENTOLIN) 2.5 mg /3 mL (0.083 %) nebulizer solution 2.5 mg by Nebulization route every four (4) hours as needed.  insulin aspart protamine/insulin aspart (NOVOLOG MIX 70-30) 100 unit/mL (70-30) injection by SubCUTAneous route three (3) times daily as needed. Uses sliding scale     ondansetron (ZOFRAN ODT) 4 mg disintegrating tablet Take 1 Tab by mouth every six (6) hours as needed for Nausea.  fluticasone (FLONASE) 50 mcg/actuation nasal spray 2 Sprays by Both Nostrils route nightly as needed.  calcitRIOL (ROCALTROL) 0.25 mcg capsule Take 0.25 mcg by mouth four (4) days a week. Mon, Wed, Fr, Sat    nitroglycerin (NITROSTAT) 0.3 mg SL tablet 1 Tab by SubLINGual route every five (5) minutes as needed for Chest Pain.  ergocalciferol (VITAMIN D2) 50,000 unit capsule Take 50,000 Units by mouth every Tuesday and Thursday.  aspirin 81 mg tablet Take 81 mg by mouth daily.  atorvastatin (LIPITOR) 80 mg tablet Take 80 mg by mouth every evening. No current facility-administered medications for this visit. Physical Examination: General appearance - alert, well appearing, and in no distress      Assessment/ Plan:   Diagnoses and all orders for this visit:    1. Chronic deep vein thrombosis (DVT) of distal vein of right lower extremity (HCC)  -     AMB POC PT/INR    2. Medication monitoring encounter  -     AMB POC PT/INR       Follow-up Disposition:  Return in about 1 month (around 1/19/2018), or if symptoms worsen or fail to improve.     I have discussed the diagnosis with the patient and the intended plan as seen in the above orders. The patient has received an after-visit summary and questions were answered concerning future plans. Pt conveyed understanding of plan.     Medication Side Effects and Warnings were discussed with patient      Delia Collins DO

## 2017-12-22 ENCOUNTER — OFFICE VISIT (OUTPATIENT)
Dept: CARDIOLOGY CLINIC | Age: 62
End: 2017-12-22

## 2017-12-22 VITALS
OXYGEN SATURATION: 98 % | SYSTOLIC BLOOD PRESSURE: 106 MMHG | DIASTOLIC BLOOD PRESSURE: 62 MMHG | BODY MASS INDEX: 41.95 KG/M2 | HEART RATE: 76 BPM | RESPIRATION RATE: 18 BRPM | WEIGHT: 293 LBS | HEIGHT: 70 IN

## 2017-12-22 DIAGNOSIS — Z99.89 OSA ON CPAP: Chronic | ICD-10-CM

## 2017-12-22 DIAGNOSIS — N18.4 TYPE 2 DIABETES MELLITUS WITH STAGE 4 CHRONIC KIDNEY DISEASE, WITH LONG-TERM CURRENT USE OF INSULIN (HCC): Chronic | ICD-10-CM

## 2017-12-22 DIAGNOSIS — E11.22 TYPE 2 DIABETES MELLITUS WITH STAGE 4 CHRONIC KIDNEY DISEASE, WITH LONG-TERM CURRENT USE OF INSULIN (HCC): Chronic | ICD-10-CM

## 2017-12-22 DIAGNOSIS — I25.118 ATHEROSCLEROSIS OF NATIVE CORONARY ARTERY OF NATIVE HEART WITH STABLE ANGINA PECTORIS (HCC): Chronic | ICD-10-CM

## 2017-12-22 DIAGNOSIS — I50.32 CHRONIC DIASTOLIC HEART FAILURE (HCC): Primary | Chronic | ICD-10-CM

## 2017-12-22 DIAGNOSIS — I10 ESSENTIAL HYPERTENSION: Chronic | ICD-10-CM

## 2017-12-22 DIAGNOSIS — J44.9 COPD, SEVERE (HCC): ICD-10-CM

## 2017-12-22 DIAGNOSIS — E66.01 MORBID OBESITY (HCC): Chronic | ICD-10-CM

## 2017-12-22 DIAGNOSIS — Z95.9 S/P LEFT PULMONARY ARTERY PRESSURE SENSOR IMPLANT PLACEMENT: ICD-10-CM

## 2017-12-22 DIAGNOSIS — I27.20 PULMONARY HTN (HCC): Chronic | ICD-10-CM

## 2017-12-22 DIAGNOSIS — I20.9 ANGINA, CLASS III (HCC): ICD-10-CM

## 2017-12-22 DIAGNOSIS — Z79.4 TYPE 2 DIABETES MELLITUS WITH STAGE 4 CHRONIC KIDNEY DISEASE, WITH LONG-TERM CURRENT USE OF INSULIN (HCC): Chronic | ICD-10-CM

## 2017-12-22 DIAGNOSIS — N18.4 CKD (CHRONIC KIDNEY DISEASE) STAGE 4, GFR 15-29 ML/MIN (HCC): Chronic | ICD-10-CM

## 2017-12-22 DIAGNOSIS — Z79.01 WARFARIN ANTICOAGULATION: ICD-10-CM

## 2017-12-22 DIAGNOSIS — G47.33 OSA ON CPAP: Chronic | ICD-10-CM

## 2017-12-22 NOTE — PROGRESS NOTES
History of Present Illness    Elian Carreno is a 63 y.o. female. Last seen 1 month ago.      Problem List  Date Reviewed: 12/19/2017          Codes Class Noted    Angina, class III ([facility] ICD-10-CM: I20.9  ICD-9-CM: 413.9  8/9/2013        Chronic diastolic heart failure (HCC) (Chronic) ICD-10-CM: I50.32  ICD-9-CM: 428.32  10/5/2012        CAD (coronary Artery Disease) Native Artery (Chronic) ICD-10-CM: I25.10  ICD-9-CM: 414.01  2/16/2011    Overview Addendum 10/5/2012  7:33 AM by PRASHANT Hampton     [place] 2004  1v CAD by cath - PCI OM with SAL  Cath 5/2004 - patent stent, no new disease  Lexiscan cardiolite 12/09- normal perfusion, EF 66%  Cath: 3/19/12: PA 55/30 mean 41, wedge 26, RA 24, EDP 35, LVG 55%, LM normal, LAD normal, LCX - OM1 patent stent, OM2 prox 85% long, % with L to R collaterals                Chest pain ICD-10-CM: R07.9  ICD-9-CM: 786.50  11/21/2017        Allergic reaction to contrast dye ICD-10-CM: T50.8X5A  ICD-9-CM: 995.27  11/20/2017        S/P left pulmonary artery pressure sensor implant placement ICD-10-CM: Z95.9  ICD-9-CM: V45.89  8/31/2017    Overview Signed 8/31/2017  9:53 AM by Lior Feliciano, RN     Cardiomems              ILD (interstitial lung disease) ([facility] ICD-10-CM: J84.9  ICD-9-CM: 515  8/20/2017        Warfarin anticoagulation ICD-10-CM: Z79.01  ICD-9-CM: V58.61  8/20/2017        COPD, severe ([facility] ICD-10-CM: J44.9  ICD-9-CM: 418  6/21/2017        CKD (chronic kidney disease) stage 4, GFR 15-29 ml/min (HCC) (Chronic) ICD-10-CM: N18.4  ICD-9-CM: 585.4  2/10/2017        DVT (deep venous thrombosis) ([facility] ICD-10-CM: I82.409  ICD-9-CM: 453.40  2/2/2017        DM (diabetes mellitus), type 2 with renal complications (HCC) (Chronic) ICD-10-CM: E11.29  ICD-9-CM: 250.40  8/9/2013        Vitamin D deficiency ICD-10-CM: E55.9  ICD-9-CM: 268.9  8/9/2013        Normocytic anemia (Chronic) ICD-10-CM: D64.9  ICD-9-CM: 285.9  5/26/2013        DJD (degenerative joint disease) of knee ICD-10-CM: M17.10  ICD-9-CM: 715.36  10/30/2012        HTN (hypertension) (Chronic) ICD-10-CM: I10  ICD-9-CM: 401.9  10/1/2012        Morbid obesity (Chronic) ICD-10-CM: E66.01  ICD-9-CM: 278.01  10/1/2012        Esophageal reflux ICD-10-CM: K21.9  ICD-9-CM: 530.81  10/1/2012        Gastroparesis ICD-10-CM: K31.84  ICD-9-CM: 536.3  10/1/2012        Pulmonary HTN (Chronic) ICD-10-CM: I27.20  ICD-9-CM: 416.8  3/21/2012        JASEN on CPAP (Chronic) ICD-10-CM: G47.33, Z99.89  ICD-9-CM: 327.23, V46.8  2/16/2011    Overview Signed 3/21/2012  8:04 AM by PRASHANT Fagan Dr. CPAP                   Cardiac Testing  CATH: 2004: 1v CAD by cath - PCI OM with SAL  CATH 5/2004 - patent stent, no new disease   Lexiscan cardiolite 12/09- normal perfusion, EF 66%  ECHO 11/2009: LVH, EF 34%, diastolic dysfunction  STRESS/LEXISCAN/CARDIOLITE 3/29/11: normal perfusion, EF 62%  CATH: 3/20/2012 :PA 55/30 mean 41, wedge 26, RA 24, EDP 35, LVG 55%, LM normal, LAD normal, LCX - OM1 patent stent, OM2 prox 85% long, % w/ L -> R collateral; s/p SAL-> OM2/OM3 with SAL. CATH: 10/4/2012: EDP 25, EF 55%, LM nl, LAD mild plaque, LCX patent OM stents, % prox with good L to R collaterals. Cath 3/18/2014 - RA 9 PA 42/19/29, PCW 12, EDP 25, no LVG due to CKD, LM nl, LAD mild plaque, LCX patent stents OM1/OM2, RCA chronic proximal occlusion with L to R collaterals. ECHO 4/11/16: EF 60-65%. No WMA. LA mildly dilated. Lexiscan Cardiolite 4/11/16: Normal. LVEF 55%. Compared to 3/29/11, no significant changes. RHC/CardioMEMS implant 8/31/17 - RA 12, PA 56/20/30, CI ([place]te) 2.65    Echo 11/30/17 - EF 65%. G1DD. No WMA. Wall thickness was mildly to moderately increased. Limited Echo 12/13/17 - Tricuspid valve: Pulmonary artery diastolic pressure: 55.44 mmHg. HPI    Mrs. Diego Piper' weight is up 5 lbs and she feels bloated. A good weight for her is 312 lbs. She has been taking Bumex 1 mg daily.  Her mean PA today was 10 (represents PAD)    She continues to experience dyspnea and chest tightness with minimal activity. She takes NTG PRN, but has been trying not to take it as much. She also notes LE edema, R>L. She notes R leg is constantly swollen since DVT. She just completed 2 IV iron infusions and will be staring Procrit next month. She notes her last hemoglobin was 7.2. Patient denies any exertional chest pain, dyspnea, palpitations, syncope, orthopnea, or paroxysmal nocturnal dyspnea. She is doing well on CPAP. She is scheduled to see Dr. Alen Ray next month. CRE from last month was 3.2     She is here with her  today. Current Outpatient Prescriptions on File Prior to Visit   Medication Sig Dispense Refill    oxyCODONE-acetaminophen (PERCOCET) 5-325 mg per tablet TAKE 1 TABLET BY MOUTH EVERY 6 HOURS AS NEEDED FOR PAIN, MAX DAILY AMOUNT OF 4 TABLETS 20 Tab 0    warfarin (COUMADIN) 2 mg tablet Take 1 Tab by mouth Every Friday. And saturday 30 Tab 1    warfarin (COUMADIN) 3 mg tablet Take 1 Tab by mouth five (5) days a week. Sun-Thurs 30 Tab 1    amLODIPine (NORVASC) 5 mg tablet Take 1 Tab by mouth daily. 30 Tab 0    ferrous gluconate 324 mg (38 mg iron) tablet Take 1 Tab by mouth daily (with breakfast). 30 Tab 0    polyethylene glycol (MIRALAX) 17 gram packet Take 1 Packet by mouth daily as needed. For constipation if no BM in 48 hours 30 Packet 0    senna-docusate (PERICOLACE) 8.6-50 mg per tablet Take 1 Tab by mouth daily. 30 Tab 0    allopurinol (ZYLOPRIM) 100 mg tablet Take 100 mg by mouth daily.  cetirizine (ZYRTEC) 10 mg tablet Take 10 mg by mouth daily as needed for Allergies.  metOLazone (ZAROXOLYN) 5 mg tablet Take 5 mg by mouth daily as needed.  albuterol (PROAIR HFA) 90 mcg/actuation inhaler Take 2 Puffs by inhalation every four (4) hours as needed for Wheezing.       carvedilol (COREG) 25 mg tablet TAKE 1 TABLET BY MOUTH TWICE A  Tab 3    isosorbide mononitrate ER (IMDUR) 120 mg CR tablet TAKE 1 TABLET BY MOUTH EVERY DAY 90 Tab 3    pantoprazole (PROTONIX) 40 mg tablet TAKE 1 TABLET BY MOUTH EVERY DAY FOR HEARTBURN 90 Tab 2    hydrOXYzine pamoate (VISTARIL) 25 mg capsule Take 1 Cap by mouth three (3) times daily as needed for Anxiety. 30 Cap 1    albuterol (PROVENTIL VENTOLIN) 2.5 mg /3 mL (0.083 %) nebulizer solution 2.5 mg by Nebulization route every four (4) hours as needed.  insulin aspart protamine/insulin aspart (NOVOLOG MIX 70-30) 100 unit/mL (70-30) injection by SubCUTAneous route three (3) times daily as needed. Uses sliding scale       ondansetron (ZOFRAN ODT) 4 mg disintegrating tablet Take 1 Tab by mouth every six (6) hours as needed for Nausea. 30 Tab 0    fluticasone (FLONASE) 50 mcg/actuation nasal spray 2 Sprays by Both Nostrils route nightly as needed.  calcitRIOL (ROCALTROL) 0.25 mcg capsule Take 0.25 mcg by mouth four (4) days a week. Mon, Wed, Fr, Sat      nitroglycerin (NITROSTAT) 0.3 mg SL tablet 1 Tab by SubLINGual route every five (5) minutes as needed for Chest Pain. 1 Bottle 1    ergocalciferol (VITAMIN D2) 50,000 unit capsule Take 50,000 Units by mouth every Tuesday and Thursday.  aspirin 81 mg tablet Take 81 mg by mouth daily.  atorvastatin (LIPITOR) 80 mg tablet Take 80 mg by mouth every evening. No current facility-administered medications on file prior to visit. Allergies   Allergen Reactions    Contrast Dye [Iodine] Anaphylaxis    Levaquin [Levofloxacin] Nausea and Vomiting    Morphine Hives and Itching     Lives in [place]. Review of Systems  Constitutional: Negative for fever, chills, malaise/fatigue and diaphoresis. +weight gain  Respiratory: Negative for cough, hemoptysis, sputum production, and wheezing. +LARA. Cardiovascular: Negative for palpitations, orthopnea, claudication, and PND.  +chest tightness, +LE edema R>L  Gastrointestinal: Negative for nausea, vomiting, blood in stool and melena. +abdominal bloating  Genitourinary: Negative for dysuria and flank pain. Musculoskeletal: Negative for joint pain and back pain. Skin: Negative for rash. Neurological: Negative for focal weakness, seizures, loss of consciousness, weakness. Endo/Heme/Allergies: Does not bruise/bleed easily. Psychiatric/Behavioral: Negative for memory loss. The patient does not have insomnia. Visit Vitals    /62 (BP 1 Location: Left arm, BP Patient Position: Sitting)    Pulse 76    Resp 18    Ht 5' 10\" (1.778 m)    Wt 339 lb (153.8 kg)    SpO2 98%    BMI 48.64 kg/m2     Wt Readings from Last 3 Encounters:   12/22/17 339 lb (153.8 kg)   12/19/17 337 lb (152.9 kg)   12/06/17 344 lb (156 kg)       Physical Exam  General - well developed well nourished, morbidly obese BF  Neck - JVP normal, thyroid normal  Cardiac - normal S1,S2, no murmurs, rubs or gallops. No clicks  Vascular - carotids without bruits, radials, femorals and pedal pulses equal bilateral  Lungs - clear to auscultation bilaterals, no rales, wheezing or rhonchi  Abd - soft nontender, no HSM, no abd bruits  Extremities -1-2+ LE edema, R>L  Skin - no rash  Neuro - nonfocal  Psych - normal mood and affect    Cardiographics  EKG 2/14 - Normal sinus rhythm, low voltage, otherwise normal EKG  Echo 02/04/17- LVEF 75%     ASSESSMENT and PLAN  Encounter Diagnoses   Name Primary?  Chronic diastolic heart failure (HCC) Yes    Essential hypertension     Atherosclerosis of native coronary artery of native heart with stable angina pectoris (HCC)     CKD (chronic kidney disease) stage 4, GFR 15-29 ml/min ([facility])     JASEN on CPAP     Pulmonary HTN     Morbid obesity     Type 2 diabetes mellitus with stage 4 chronic kidney disease, with long-term current use of insulin (HCC)     S/P left pulmonary artery pressure sensor implant placement     Warfarin anticoagulation     COPD, severe ([address]     Angina, class III [facility])      Mrs. Chidi Guzmán has class 3+ LARA due to ILD, CAD, and diastolic dysfunction. Her volume status seems up. We have had some trouble with her CardioMEMS with respect to calibration. We are using mean PA for her PAD. I am hopeful this will settle out in the near future. If not, we may need to repeat R heart cath to recalibrate. In the interim, increase Bumex to 2 mg daily until her weight is back to her \"dry\" weight of 312 lbs. Continue oral nitrates for angina, likely aggravated by severe anemia due to her CKD. I would like to see her hemoglobin get to 10 before contemplating cath. Continue close monitoring. Follow-up Disposition:  Return in about 3 months (around 3/22/2018).     Written by Art Meléndez, as dictated by Domenic Charles MD.   Domenic Charles MD

## 2017-12-23 RX ORDER — BUMETANIDE 1 MG/1
1 TABLET ORAL DAILY
Qty: 30 TAB | Refills: 5
Start: 2017-12-23 | End: 2018-01-17 | Stop reason: SDUPTHER

## 2017-12-27 ENCOUNTER — NURSE NAVIGATOR (OUTPATIENT)
Dept: CASE MANAGEMENT | Age: 62
End: 2017-12-27

## 2017-12-27 DIAGNOSIS — M10.379 ACUTE GOUT DUE TO RENAL IMPAIRMENT INVOLVING FOOT, UNSPECIFIED LATERALITY: ICD-10-CM

## 2017-12-27 DIAGNOSIS — I82.4Y9 DEEP VEIN THROMBOSIS (DVT) OF PROXIMAL LOWER EXTREMITY, UNSPECIFIED CHRONICITY, UNSPECIFIED LATERALITY (HCC): ICD-10-CM

## 2017-12-27 RX ORDER — OXYCODONE AND ACETAMINOPHEN 5; 325 MG/1; MG/1
TABLET ORAL
Qty: 20 TAB | Refills: 0 | Status: SHIPPED | OUTPATIENT
Start: 2017-12-27 | End: 2018-01-10

## 2018-01-02 ENCOUNTER — TELEPHONE (OUTPATIENT)
Dept: CASE MANAGEMENT | Age: 63
End: 2018-01-02

## 2018-01-02 ENCOUNTER — TELEPHONE (OUTPATIENT)
Dept: CARDIOLOGY CLINIC | Age: 63
End: 2018-01-02

## 2018-01-02 NOTE — TELEPHONE ENCOUNTER
Cardiomems reading today is low for patient with PA mean of 7 mmHg. Last reading done by patient was on 12/28 and was 14mmHg. Mariposa Jackson NP notified. TC made to patient. She reports that her weight is stable and her symptoms are at her baseline. Requested she repeat her Cardiomems reading now. Repeat reading by patient even lower with result of 1 mmHg. Waveform viewed and appears to be a good waveform/reading. Mars Hager notified of repeat reading. Mars Hager requested Rodolfo Flores to call patient and ask her to hold bumex, repeat reading tomorrow. Mars Hager requests we report tomorrow's reading. Patient stated she was not doing weekend readings because she did not think we checked them. Explained to her that we check weekend readings on the next business day. She will resume her weekend readings.

## 2018-01-02 NOTE — TELEPHONE ENCOUNTER
CardioMEMs PAD mean reading today of 7. Two additional readings were done to remeasure. See NN note. Per Ham Santiago, NP will hold tonight and tomorrow morning's dose of bumex. She voices understanding.

## 2018-01-03 NOTE — TELEPHONE ENCOUNTER
Follow up discussion with Ms. Gonsales after holding Bumex 1 mg last night and this morning for PA mean of 7 yesterday. PA mean 8 today. Review of last progress note from 12/22, PA Mean was 10. Her Bumex was increased to 2 mg per day ( 1 mg BID) and a plan was made to continue this does until she was back to her \"basline\" weight of 312. Her weight today is 338, 1 pound less than she was on 12/22/17. She continues to have significant lower extremity edema and dyspnea. Despite the low PA Mean today, I have ask her to take Bumex 1 mg tonight. She will measure her reading again early tomorrow morning, prior to taking another dose of Bumex and we will then determine her plan moving forward. Will discuss the option of recalibrating CardioMEMS further with Dr. Dharmesh Barry in the near future.

## 2018-01-04 NOTE — TELEPHONE ENCOUNTER
PA mean today has increased back up to 12 after holding 3 doses of Bumex. She did take last nights dose and number still increased back to goal range. I have advised her to take Bumex 1 mg this morning and to hold tonight's dose. She will measure again early tomorrow morning and we will then phone follow up to determine a plan moving through the weekend.

## 2018-01-05 NOTE — TELEPHONE ENCOUNTER
PA Mean 14 today. \"I'm feeling a little full\". Plan to resume Bumex 1 mg BID through weekend. Advised her to continue to measure daily through weekend and we will re-evaluate trend on Monday.

## 2018-01-08 ENCOUNTER — TELEPHONE (OUTPATIENT)
Dept: CARDIOLOGY CLINIC | Age: 63
End: 2018-01-08

## 2018-01-08 NOTE — TELEPHONE ENCOUNTER
PA mean today 1/8/17 is 9. Sunday 1/7/17 was 12  No measurement on Saturday. Friday 1/5/17 was 14 and I resumed Bumex 1 mg BID. I spoke with Ms. Gonsales and have recommended that we continue Bumex 1 mg in AM and to reduce to 0.5 mg in PM. She reports that her 1 mg tabs are too small to break in half and that she couldn't do that. I have asked her to see if she has a pill cutter and if not could she obtain one in order to reduce dose to 0.5 mg. If not, I have asked that she simply hold tonight's dose again. I feel that she needs less than 1 mg for the 2nd dose of the day, but holding the evening dose last week resulted in her pressure rising. Will phone follow up again tomorrow.

## 2018-01-09 NOTE — TELEPHONE ENCOUNTER
PA mean today is 8. Ms. Aura Concepcion held her evening dose of Bumex yesterday (took 1 mg in AM). She has already taken 1 mg as of this morning. Will plan to hold again tonight. She is feeling fatigued today. I have asked her to obtain a weight for me tomorrow morning when she does her CardioMEMS measurement. Will phone follow up again tomorrow.

## 2018-01-10 ENCOUNTER — OFFICE VISIT (OUTPATIENT)
Dept: FAMILY MEDICINE CLINIC | Age: 63
End: 2018-01-10

## 2018-01-10 ENCOUNTER — HOSPITAL ENCOUNTER (OUTPATIENT)
Dept: LAB | Age: 63
Discharge: HOME | End: 2018-01-10
Payer: MEDICARE

## 2018-01-10 VITALS
WEIGHT: 293 LBS | TEMPERATURE: 98.2 F | SYSTOLIC BLOOD PRESSURE: 158 MMHG | OXYGEN SATURATION: 95 % | BODY MASS INDEX: 41.95 KG/M2 | HEART RATE: 83 BPM | HEIGHT: 70 IN | DIASTOLIC BLOOD PRESSURE: 58 MMHG | RESPIRATION RATE: 22 BRPM

## 2018-01-10 DIAGNOSIS — Z51.81 MEDICATION MONITORING ENCOUNTER: ICD-10-CM

## 2018-01-10 DIAGNOSIS — N18.5 ANEMIA IN STAGE 5 CHRONIC KIDNEY DISEASE, NOT ON CHRONIC DIALYSIS (HCC): ICD-10-CM

## 2018-01-10 DIAGNOSIS — I20.9 ANGINA, CLASS III (HCC): ICD-10-CM

## 2018-01-10 DIAGNOSIS — I82.4Y9 DEEP VEIN THROMBOSIS (DVT) OF PROXIMAL LOWER EXTREMITY, UNSPECIFIED CHRONICITY, UNSPECIFIED LATERALITY (HCC): ICD-10-CM

## 2018-01-10 DIAGNOSIS — Z79.01 ON WARFARIN THERAPY: Primary | ICD-10-CM

## 2018-01-10 DIAGNOSIS — R07.9 CHRONIC CHEST PAIN: ICD-10-CM

## 2018-01-10 DIAGNOSIS — D63.1 ANEMIA IN STAGE 5 CHRONIC KIDNEY DISEASE, NOT ON CHRONIC DIALYSIS (HCC): ICD-10-CM

## 2018-01-10 DIAGNOSIS — G89.29 CHRONIC CHEST PAIN: ICD-10-CM

## 2018-01-10 DIAGNOSIS — M10.379 ACUTE GOUT DUE TO RENAL IMPAIRMENT INVOLVING FOOT, UNSPECIFIED LATERALITY: ICD-10-CM

## 2018-01-10 LAB
BARBITURATES UR POC: NEGATIVE
BENZODIAZEPINES UR POC: NEGATIVE
COCAINE QL URINE POC: NEGATIVE
INR BLD: 1.7
LOT EXP DATE POC: NORMAL
LOT NUMBER POC: NORMAL
MARIJUANA (THC) QL URINE POC: NEGATIVE
MDMA/ECSTASY UR POC: NEGATIVE
METHADONE QL URINE POC: NEGATIVE
METHAMPHETAMINE QL URINE POC: NEGATIVE
NTI OTHER MICRO TRANSPORT 977598: NEGATIVE
OPIATES QL URINE POC: NEGATIVE
OXYCODONE UR POC: NORMAL
PT POC: 20.9 SECONDS
VALID INTERNAL CONTROL?: YES
VALID INTERNAL CONTROL?: YES

## 2018-01-10 PROCEDURE — 85018 HEMOGLOBIN: CPT

## 2018-01-10 RX ORDER — OXYCODONE AND ACETAMINOPHEN 7.5; 325 MG/1; MG/1
1 TABLET ORAL
Qty: 90 TAB | Refills: 0 | Status: SHIPPED | OUTPATIENT
Start: 2018-01-10 | End: 2018-02-22 | Stop reason: SDUPTHER

## 2018-01-10 NOTE — PROGRESS NOTES
Areli Greco is a 63 y.o. female   Chief Complaint   Patient presents with    Coagulation disorder    pt here for recheck of her INR. Pt states she is having continuous chest pain and pt needs a cath but is having issues with her anemia and has been seeing nephro regarding this. Pt has been using 1 percocet per day at 5/325 and opnly using it at night and last a couple hours and brings her pain from a 9/10 to a 7/10. Pt  is here and states that she is tossing and turning all night too due to discomfort and pain. Pt has been getting iron infusions and will be getting her first procrit on the 22 of this month. Pt would like hgb hct rechecked today. She is very anxious to have her procedures/cath to help her CP.  she is a 63y.o. year old female who presents for evalution. Reviewed PmHx, RxHx, FmHx, SocHx, AllgHx and updated and dated in the chart. Review of Systems - negative except as listed above in the HPI    Objective:     Vitals:    01/10/18 1509   BP: 158/58   Pulse: 83   Resp: 22   Temp: 98.2 °F (36.8 °C)   TempSrc: Oral   SpO2: 95%   Weight: 345 lb (156.5 kg)   Height: 5' 10\" (1.778 m)       Current Outpatient Prescriptions   Medication Sig    oxyCODONE-acetaminophen (PERCOCET 7.5) 7.5-325 mg per tablet Take 1 Tab by mouth every eight (8) hours as needed for Pain. Max Daily Amount: 3 Tabs.  bumetanide (BUMEX) 1 mg tablet Take 1 Tab by mouth daily.  warfarin (COUMADIN) 2 mg tablet Take 1 Tab by mouth Every Friday. And saturday    warfarin (COUMADIN) 3 mg tablet Take 1 Tab by mouth five (5) days a week. Sun-Thurs    amLODIPine (NORVASC) 5 mg tablet Take 1 Tab by mouth daily.  ferrous gluconate 324 mg (38 mg iron) tablet Take 1 Tab by mouth daily (with breakfast).  polyethylene glycol (MIRALAX) 17 gram packet Take 1 Packet by mouth daily as needed. For constipation if no BM in 48 hours    senna-docusate (PERICOLACE) 8.6-50 mg per tablet Take 1 Tab by mouth daily.  allopurinol (ZYLOPRIM) 100 mg tablet Take 100 mg by mouth daily.  cetirizine (ZYRTEC) 10 mg tablet Take 10 mg by mouth daily as needed for Allergies.  albuterol (PROAIR HFA) 90 mcg/actuation inhaler Take 2 Puffs by inhalation every four (4) hours as needed for Wheezing.  carvedilol (COREG) 25 mg tablet TAKE 1 TABLET BY MOUTH TWICE A DAY    isosorbide mononitrate ER (IMDUR) 120 mg CR tablet TAKE 1 TABLET BY MOUTH EVERY DAY    pantoprazole (PROTONIX) 40 mg tablet TAKE 1 TABLET BY MOUTH EVERY DAY FOR HEARTBURN    hydrOXYzine pamoate (VISTARIL) 25 mg capsule Take 1 Cap by mouth three (3) times daily as needed for Anxiety.  albuterol (PROVENTIL VENTOLIN) 2.5 mg /3 mL (0.083 %) nebulizer solution 2.5 mg by Nebulization route every four (4) hours as needed.  insulin aspart protamine/insulin aspart (NOVOLOG MIX 70-30) 100 unit/mL (70-30) injection by SubCUTAneous route three (3) times daily as needed. Uses sliding scale     ondansetron (ZOFRAN ODT) 4 mg disintegrating tablet Take 1 Tab by mouth every six (6) hours as needed for Nausea.  fluticasone (FLONASE) 50 mcg/actuation nasal spray 2 Sprays by Both Nostrils route nightly as needed.  calcitRIOL (ROCALTROL) 0.25 mcg capsule Take 0.25 mcg by mouth four (4) days a week. Mon, Wed, Fr, Sat    nitroglycerin (NITROSTAT) 0.3 mg SL tablet 1 Tab by SubLINGual route every five (5) minutes as needed for Chest Pain.  ergocalciferol (VITAMIN D2) 50,000 unit capsule Take 50,000 Units by mouth every Tuesday and Thursday.  aspirin 81 mg tablet Take 81 mg by mouth daily.  atorvastatin (LIPITOR) 80 mg tablet Take 80 mg by mouth every evening.  metOLazone (ZAROXOLYN) 5 mg tablet Take 5 mg by mouth daily as needed. No current facility-administered medications for this visit.         Physical Examination: General appearance - chronically ill appearing and pt appears uncomfortable  Mental status - alert, oriented to person, place, and time  Eyes - pupils equal and reactive, extraocular eye movements intact  Chest - mild rales b/l lower lobes  Heart - normal rate, regular rhythm, normal S1, S2, no murmurs, rubs, clicks or gallops      Assessment/ Plan:   Diagnoses and all orders for this visit:    1. On warfarin therapy  -     AMB POC PT/INR  Double dose today then follow schedule in med list  2. Deep vein thrombosis (DVT) of proximal lower extremity, unspecified chronicity, unspecified laterality ([facility]    3. Acute gout due to renal impairment involving foot, unspecified laterality    4. Chronic chest pain  -     oxyCODONE-acetaminophen (PERCOCET 7.5) 7.5-325 mg per tablet; Take 1 Tab by mouth every eight (8) hours as needed for Pain. Max Daily Amount: 3 Tabs. 5. Angina, class III (HCC)  -     oxyCODONE-acetaminophen (PERCOCET 7.5) 7.5-325 mg per tablet; Take 1 Tab by mouth every eight (8) hours as needed for Pain. Max Daily Amount: 3 Tabs. Take an extra dose of fluid pill today  6. Anemia in stage 5 chronic kidney disease, not on chronic dialysis (HCC)  -     HGB & HCT    7. Medication monitoring encounter  -     AMB POC ALERE ICUP DX DRUG SCREEN 10     utox appropriate  Follow-up Disposition:  Return in about 2 weeks (around 1/24/2018), or if symptoms worsen or fail to improve. I have discussed the diagnosis with the patient and the intended plan as seen in the above orders. The patient has received an after-visit summary and questions were answered concerning future plans. Pt conveyed understanding of plan.     Medication Side Effects and Warnings were discussed with patient      [address] [facility] [address], DO

## 2018-01-10 NOTE — PROGRESS NOTES
Pt here for PT INR  States she has gained 5lbs since last evening.   Results for orders placed or performed in visit on 01/10/18   AMB POC PT/INR   Result Value Ref Range    VALID INTERNAL CONTROL POC Yes     Prothrombin time (POC) 20.9 seconds    INR POC 1.7

## 2018-01-11 ENCOUNTER — TELEPHONE (OUTPATIENT)
Dept: CARDIOLOGY CLINIC | Age: 63
End: 2018-01-11

## 2018-01-11 LAB
HCT VFR BLD AUTO: 25.1 % (ref 34–46.6)
HGB BLD-MCNC: 7.9 G/DL (ref 11.1–15.9)
INTERPRETATION: NORMAL

## 2018-01-11 NOTE — TELEPHONE ENCOUNTER
PA Mean   1/10 10  1/11 8    Patient states she is still fatigue. Weight the night of 1/9 was 140lbs on home scale. Her weight at PCP's office 1/10 was 145lbs. She took Bumex 1mg BID yesterday and 1mg this morning. Per Ana Cao NP, will have patient continue current dose due to weight increase. Patient voices understanding.

## 2018-01-12 NOTE — TELEPHONE ENCOUNTER
PA mean of 10 today 1/12/18. Per Nevaeh Kent NP, patient will continue bumex 1mg BID. She voices understanding.

## 2018-01-17 RX ORDER — BUMETANIDE 1 MG/1
1 TABLET ORAL 2 TIMES DAILY
Qty: 60 TAB | Refills: 3 | Status: SHIPPED | OUTPATIENT
Start: 2018-01-17 | End: 2018-04-05 | Stop reason: DRUGHIGH

## 2018-01-17 NOTE — TELEPHONE ENCOUNTER
Pa mean of 4 today. Per Nevaeh Kent NP, hold tonight and tomorrow morning's dose of bumex. Patient voices understanding.

## 2018-01-18 NOTE — TELEPHONE ENCOUNTER
Pa mean today of 10. Per Minnie Sender, CABRERA, patient will take bumex 1mg tonight and follow-up tomorrow. She voices understanding.

## 2018-01-19 ENCOUNTER — TELEPHONE (OUTPATIENT)
Dept: CARDIOLOGY CLINIC | Age: 63
End: 2018-01-19

## 2018-01-19 ENCOUNTER — NURSE NAVIGATOR (OUTPATIENT)
Dept: CASE MANAGEMENT | Age: 63
End: 2018-01-19

## 2018-01-19 NOTE — TELEPHONE ENCOUNTER
CardioMEMS reading of 7 today. Reading of 4 on 1/17/18 - held 2 doses of Bumex 1 mg. Reading up to 10 on 1/18/18 - she took 1 mg yesterday. Now back down to 7, although weight remains elevated at 340 with peaks up to 344 when we hold diuretics. (base weight is ~ 310-315) She continues to feel fatigue and dyspnea with mild exertion; unchanged. Discussed ongoing fluctuations in CardioMEMS data with Dr. Earl Kaye. He will further assess the potential of device DRIFT. At this time, I have advised her to continue with Bumex 1 mg every morning. She will also obtain daily weights and use this to determine if she will take the evening dose through this weekend. Encouraged her to continue to measure CardioMEMS data daily. She voices understanding of this plan. We will plan to phone follow up again on Monday.

## 2018-01-19 NOTE — NURSE NAVIGATOR
CARDIOMEMS reading shows PA mean of 7 mmHg today. Jen Tavarez NP, JUANCHO Parks notified of reading via Draft.

## 2018-01-22 ENCOUNTER — TELEPHONE (OUTPATIENT)
Dept: CARDIOLOGY CLINIC | Age: 63
End: 2018-01-22

## 2018-01-22 NOTE — TELEPHONE ENCOUNTER
Patient would like to speak with someone about her blood levels, and possibly receiving a transfusion. Patient can be reached at [phone]. Thanks !

## 2018-01-23 NOTE — TELEPHONE ENCOUNTER
Weights:  1/23 344lbs  1/22 341lbs  1/19 340lbs    Patient states she has been taking bumex 1mg BID over the weekend. She continues to feel fatigue and dyspnea with mild exertion. Patient received procrit injection yesterday. Hgb prior to injection was 7.4. Previously 7.9 on 1/10/18. Patient is inquiring about another blood transfusion.

## 2018-01-23 NOTE — TELEPHONE ENCOUNTER
Marica Lesch, NP Kallie Maul, LINDSAY        Caller: Unspecified (Yesterday,  3:04 PM)                     Based on her weight and measurement of 9 on 1/22, I would like to continue with BID dosing.  If she drops down again, below 9/10 - I am going to have her resume the taking 1 mg in morning and then using weight to gauge if she takes the evening dose. Her Hgb, injections of stimulants for her blood cells and transfusions would be guided by her Nephrologist and Hematologist at this time.  Dr. Neville White may have been the ordering physician last time, but that was in the setting of a hospitalization. He does not order outpatient transfusions.         Patient notified. She states she has not seen a hematologist in 3 years. She states she contacted Dr. Tanya Thomas office and was told to wait and see what the Procrit injection does.

## 2018-01-24 ENCOUNTER — OFFICE VISIT (OUTPATIENT)
Dept: FAMILY MEDICINE CLINIC | Age: 63
End: 2018-01-24

## 2018-01-24 VITALS
HEIGHT: 70 IN | BODY MASS INDEX: 41.95 KG/M2 | SYSTOLIC BLOOD PRESSURE: 152 MMHG | OXYGEN SATURATION: 98 % | DIASTOLIC BLOOD PRESSURE: 74 MMHG | TEMPERATURE: 98.1 F | WEIGHT: 293 LBS | HEART RATE: 71 BPM | RESPIRATION RATE: 18 BRPM

## 2018-01-24 DIAGNOSIS — N18.4 CKD (CHRONIC KIDNEY DISEASE) STAGE 4, GFR 15-29 ML/MIN (HCC): Chronic | ICD-10-CM

## 2018-01-24 DIAGNOSIS — Z79.01 ON WARFARIN THERAPY: Primary | ICD-10-CM

## 2018-01-24 LAB
INR BLD: 2.6
PT POC: 31.4 SECONDS
VALID INTERNAL CONTROL?: YES

## 2018-01-24 NOTE — PROGRESS NOTES
Griselda Grayer is a 63 y.o. female   Chief Complaint   Patient presents with    Coagulation disorder    pt here for recheck of her INR and is currently 2.6 will continue with current dose and recheck in 3-4 weeks. Pt states her hgb has gone down since I had checked but pt did just receive a shot of procrit. Pt is going to a [place] for a  since her niece just passed, pt advised against this but is going anyways and I have discussed stopping every hour to move around outside of the vehicle. she is a 63y.o. year old female who presents for evalution. Reviewed PmHx, RxHx, FmHx, SocHx, AllgHx and updated and dated in the chart. Review of Systems - negative except as listed above in the HPI    Objective:     Vitals:    18 1430   BP: 152/74   Pulse: 71   Resp: 18   Temp: 98.1 °F (36.7 °C)   TempSrc: Oral   SpO2: 98%   Weight: 341 lb (154.7 kg)   Height: 5' 10\" (1.778 m)       Current Outpatient Prescriptions   Medication Sig    bumetanide (BUMEX) 1 mg tablet Take 1 Tab by mouth two (2) times a day. May take an extra one as needed.  oxyCODONE-acetaminophen (PERCOCET 7.5) 7.5-325 mg per tablet Take 1 Tab by mouth every eight (8) hours as needed for Pain. Max Daily Amount: 3 Tabs.  warfarin (COUMADIN) 2 mg tablet Take 1 Tab by mouth Every Friday. And saturday    warfarin (COUMADIN) 3 mg tablet Take 1 Tab by mouth five (5) days a week. Sun-Thurs    amLODIPine (NORVASC) 5 mg tablet Take 1 Tab by mouth daily.  ferrous gluconate 324 mg (38 mg iron) tablet Take 1 Tab by mouth daily (with breakfast).  polyethylene glycol (MIRALAX) 17 gram packet Take 1 Packet by mouth daily as needed. For constipation if no BM in 48 hours    senna-docusate (PERICOLACE) 8.6-50 mg per tablet Take 1 Tab by mouth daily.  allopurinol (ZYLOPRIM) 100 mg tablet Take 100 mg by mouth daily.  metOLazone (ZAROXOLYN) 5 mg tablet Take 5 mg by mouth daily as needed.     albuterol (PROAIR HFA) 90 mcg/actuation inhaler Take 2 Puffs by inhalation every four (4) hours as needed for Wheezing.  carvedilol (COREG) 25 mg tablet TAKE 1 TABLET BY MOUTH TWICE A DAY    isosorbide mononitrate ER (IMDUR) 120 mg CR tablet TAKE 1 TABLET BY MOUTH EVERY DAY    pantoprazole (PROTONIX) 40 mg tablet TAKE 1 TABLET BY MOUTH EVERY DAY FOR HEARTBURN    hydrOXYzine pamoate (VISTARIL) 25 mg capsule Take 1 Cap by mouth three (3) times daily as needed for Anxiety.  albuterol (PROVENTIL VENTOLIN) 2.5 mg /3 mL (0.083 %) nebulizer solution 2.5 mg by Nebulization route every four (4) hours as needed.  insulin aspart protamine/insulin aspart (NOVOLOG MIX 70-30) 100 unit/mL (70-30) injection by SubCUTAneous route three (3) times daily as needed. Uses sliding scale     ondansetron (ZOFRAN ODT) 4 mg disintegrating tablet Take 1 Tab by mouth every six (6) hours as needed for Nausea.  fluticasone (FLONASE) 50 mcg/actuation nasal spray 2 Sprays by Both Nostrils route nightly as needed.  calcitRIOL (ROCALTROL) 0.25 mcg capsule Take 0.25 mcg by mouth four (4) days a week. Mon, Wed, Fr, Sat    nitroglycerin (NITROSTAT) 0.3 mg SL tablet 1 Tab by SubLINGual route every five (5) minutes as needed for Chest Pain.  ergocalciferol (VITAMIN D2) 50,000 unit capsule Take 50,000 Units by mouth every Tuesday and Thursday.  aspirin 81 mg tablet Take 81 mg by mouth daily.  atorvastatin (LIPITOR) 80 mg tablet Take 80 mg by mouth every evening.  cetirizine (ZYRTEC) 10 mg tablet Take 10 mg by mouth daily as needed for Allergies. No current facility-administered medications for this visit. Physical Examination: General appearance - alert, well appearing, and in no distress  Chest - clear to auscultation, no wheezes, rales or rhonchi, symmetric air entry  Heart - normal rate, regular rhythm, normal S1, S2, no murmurs, rubs, clicks or gallops      Assessment/ Plan:   Diagnoses and all orders for this visit:    1.  On warfarin therapy  -     AMB POC PT/INR  At goal recheck 3-4 weeks  2. CKD (chronic kidney disease) stage 4, GFR 15-29 ml/min (HCC)  Causing anemia c/w nephro     Follow-up Disposition:  Return in about 3 weeks (around 2/14/2018), or if symptoms worsen or fail to improve. I have discussed the diagnosis with the patient and the intended plan as seen in the above orders. The patient has received an after-visit summary and questions were answered concerning future plans. Pt conveyed understanding of plan.     Medication Side Effects and Warnings were discussed with patient      Meena Partida DO

## 2018-01-24 NOTE — PATIENT INSTRUCTIONS
Warfarin (By mouth)   Warfarin (WAR-far-in)  Prevents and treats blood clots. May lower the risk of serious complications after a heart attack. This medicine is a blood thinner. Brand Name(s): Coumadin, Jantoven   There may be other brand names for this medicine. When This Medicine Should Not Be Used: This medicine is not right for everyone. Do not use it if you had an allergic reaction to warfarin, if you are pregnant, or if you have health problems that could cause bleeding. How to Use This Medicine:   Tablet  · Take your medicine as directed. Your dose may need to be changed several times to find what works best for you. · This medicine should come with a Medication Guide. Ask your pharmacist for a copy if you do not have one. · Missed dose: Take a dose as soon as you remember. If it is almost time for your next dose, wait until then and take a regular dose. Do not take extra medicine to make up for a missed dose. · Store the medicine in a closed container at room temperature, away from heat, moisture, and direct light. Drugs and Foods to Avoid:   Ask your doctor or pharmacist before using any other medicine, including over-the-counter medicines, vitamins, and herbal products. · Many medicines and foods can affect how warfarin works and may affect the PT/INR test results.  Tell your doctor before you start or stop any medicine, especially the following:   ¨ Co-enzyme K26, echinacea, garlic, ginkgo, ginseng, goldenseal, or Cathi's wort  ¨ Another blood thinner, including apixaban, argatroban, bivalirudin, cilostazol, clopidogrel, dabigatran, desirudin, dipyridamole, heparin, lepirudin, prasugrel, rivaroxaban, ticlopidine  ¨ Medicine to treat depression or anxiety, including citalopram, desvenlafaxine, duloxetine, escitalopram, fluoxetine, fluvoxamine, milnacipran, paroxetine, sertraline, venlafaxine, vilazodone  ¨ Medicine to treat an infection  ¨ NSAID pain or arthritis medicine, including aspirin, celecoxib, diclofenac, diflunisal, fenoprofen, ibuprofen, indomethacin, ketoprofen, ketorolac, mefenamic acid, naproxen, oxaprozin, piroxicam, sulindac. Check labels for over-the-counter medicines to find out if they contain an NSAID. ¨ Steroid medicine, including dexamethasone, hydrocortisone, methylprednisolone, prednisolone, prednisone  · Warfarin works best if you eat about the same amount of vitamin K every day. Foods high in vitamin K include asparagus, broccoli, brussels sprouts, cabbage, green leafy vegetables, plums, rhubarb, and canola oil. Talk to your doctor before you make changes to your normal diet. · Do not eat grapefruit or drink grapefruit juice while you are using this medicine. Warnings While Using This Medicine:   · It is not safe to take this medicine during pregnancy. It could harm an unborn baby. Tell your doctor right away if you become pregnant. Use an effective form of birth control to keep from getting pregnant during treatment and for at least 1 month after your last dose. · Tell your doctor if you are breastfeeding, or if you have kidney disease, liver disease, heart disease, diabetes, heart failure, high blood pressure, an infection, a stomach ulcer, or protein C deficiency. Also tell your doctor if you had recent surgery or an injury, or a history of stroke, anemia, severe bleeding or bruising, or problems caused by heparin use. · This medicine may cause the following problems:   ¨ Bleeding, which may be life-threatening  ¨ Gangrene (skin or tissue damage)  ¨ Calciphylaxis or calcium uremic arteriolopathy  ¨ Kidney problems, including acute kidney injury  ¨ Purple toes syndrome  · You must have a PT/INR blood test while you use this medicine to check how well your blood is clotting. Your doctor will use the test results to make sure the medicine is working properly. Keep all appointments for the PT/INR blood tests. · You may bleed or bruise more easily with warfarin.  To prevent injury or cuts, do not play rough sports, be careful with sharp objects, and brush and floss your teeth gently. [employer] your nose gently, and do not pick your nose. · Carry an ID card or wear a medical alert bracelet to let emergency caregivers know that you use warfarin. · Tell any doctor or dentist who treats you that you are using this medicine. You may need to stop using this medicine several days before you have surgery or medical tests. · Keep all medicine out of the reach of children. Never share your medicine with anyone. Possible Side Effects While Using This Medicine:   Call your doctor right away if you notice any of these side effects:  · Allergic reaction: Itching or hives, swelling in your face or hands, swelling or tingling in your mouth or throat, chest tightness, trouble breathing  · Bleeding from your gums or nose, coughing up blood, heavy monthly periods  · Bleeding that does not stop, bruising, or weakness  · Dizziness, fainting, lightheadedness  · Pain, brown or black skin, or skin that is cool to the touch  · Purple toes or feet, or new pain in your leg, foot, or toes  · Purplish red, net-like, blotchy spots on the skin  · Red or dark brown urine, or red or black, tarry stools  · Vomiting blood or material that looks like coffee grounds  If you notice other side effects that you think are caused by this medicine, tell your doctor. Call your doctor for medical advice about side effects. You may report side effects to FDA at 2-493-FDA-0460  © 2017 [facility] Information is for End User's use only and may not be sold, redistributed or otherwise used for commercial purposes. The above information is an  only. It is not intended as medical advice for individual conditions or treatments. Talk to your doctor, nurse or pharmacist before following any medical regimen to see if it is safe and effective for you.

## 2018-02-20 ENCOUNTER — TELEPHONE (OUTPATIENT)
Dept: CASE MANAGEMENT | Age: 63
End: 2018-02-20

## 2018-02-20 NOTE — TELEPHONE ENCOUNTER
TC to patient regarding [employer] reading as no reading received since 2/16. Patient states she has been unable to transmit a reading. Device is telling her to remove electronic devices, even though there are none, and shift on pillow. Patient voiced frustration and did not want to call technical support. She is willing to take a phone call if they call her. This NN called [employer] support number and spoke with Violet Lynn who will reach out to patient. Violet Lynn also updated on the drift that patient has been experiencing with baseline PAd falling into negative numbers. Dr Mateo Foster and Jaden Angel NP notified of issue as well.

## 2018-02-22 ENCOUNTER — OFFICE VISIT (OUTPATIENT)
Dept: FAMILY MEDICINE CLINIC | Age: 63
End: 2018-02-22

## 2018-02-22 ENCOUNTER — HOSPITAL ENCOUNTER (OUTPATIENT)
Dept: LAB | Age: 63
Discharge: HOME | End: 2018-02-22
Payer: MEDICARE

## 2018-02-22 VITALS
BODY MASS INDEX: 41.95 KG/M2 | DIASTOLIC BLOOD PRESSURE: 68 MMHG | SYSTOLIC BLOOD PRESSURE: 142 MMHG | TEMPERATURE: 97.8 F | RESPIRATION RATE: 18 BRPM | HEIGHT: 70 IN | WEIGHT: 293 LBS

## 2018-02-22 DIAGNOSIS — D64.9 ANEMIA, UNSPECIFIED TYPE: ICD-10-CM

## 2018-02-22 DIAGNOSIS — G89.29 CHRONIC CHEST PAIN: ICD-10-CM

## 2018-02-22 DIAGNOSIS — Z79.01 WARFARIN ANTICOAGULATION: ICD-10-CM

## 2018-02-22 DIAGNOSIS — R07.9 CHRONIC CHEST PAIN: ICD-10-CM

## 2018-02-22 DIAGNOSIS — I20.9 ANGINA, CLASS III (HCC): ICD-10-CM

## 2018-02-22 DIAGNOSIS — I82.5Z1 CHRONIC DEEP VEIN THROMBOSIS (DVT) OF DISTAL VEIN OF RIGHT LOWER EXTREMITY (HCC): Primary | ICD-10-CM

## 2018-02-22 LAB
INR BLD: 3
PT POC: 35.8 SECONDS
VALID INTERNAL CONTROL?: NO

## 2018-02-22 PROCEDURE — 85018 HEMOGLOBIN: CPT

## 2018-02-22 RX ORDER — OXYCODONE AND ACETAMINOPHEN 7.5; 325 MG/1; MG/1
1 TABLET ORAL
Qty: 90 TAB | Refills: 0 | Status: SHIPPED | OUTPATIENT
Start: 2018-02-22 | End: 2018-04-03 | Stop reason: SDUPTHER

## 2018-02-22 NOTE — PROGRESS NOTES
Simba Mills is a 63 y.o. female   Chief Complaint   Patient presents with    Coagulation disorder    pt here for recheck of her INR and states her joints have been bothering her with the change in weather. Pt INR remains at goal at 3 and will continue with current treatment and recheck in a month. Pt had an uneventful trip to [facility]. Pt also requesting refill of her pain medication states she has been using more often than she likes due to increasing pain in her legs. Opioid/Pain Management:    1. Has the patient signed a pain contract for chronic narcotic use? yes  2. Has the patient had a UDS or Serum screen as per guidelines as per [facility]?:   yes    3. Does patient meet necessary guidelines for Naloxone treatement per the [facility]?:    no  4. Has the Prescription Monitoring Program been reviewed? yes  5. Does patient have a long term condition that requires long term use of a Narcotic?  yes  6. Has patient been tolerant of therapy and responsible to routine follow up and specialist follow-up? Yes      she is a 63y.o. year old female who presents for evalution. Reviewed PmHx, RxHx, FmHx, SocHx, AllgHx and updated and dated in the chart. Review of Systems - negative except as listed above in the HPI    Objective:     Vitals:    02/22/18 1430   BP: 142/68   Resp: 18   Temp: 97.8 °F (36.6 °C)   TempSrc: Oral   Weight: 339 lb (153.8 kg)   Height: 5' 10\" (1.778 m)       Current Outpatient Prescriptions   Medication Sig    oxyCODONE-acetaminophen (PERCOCET 7.5) 7.5-325 mg per tablet Take 1 Tab by mouth every eight (8) hours as needed for Pain. Max Daily Amount: 3 Tabs.  warfarin (COUMADIN) 2 mg tablet Take 1 Tab by mouth Every Friday. And saturday    warfarin (COUMADIN) 3 mg tablet Take 1 Tab by mouth five (5) days a week. Sun-Thurs    amLODIPine (NORVASC) 5 mg tablet Take 1 Tab by mouth daily.     ferrous gluconate 324 mg (38 mg iron) tablet Take 1 Tab by mouth daily (with breakfast).  polyethylene glycol (MIRALAX) 17 gram packet Take 1 Packet by mouth daily as needed. For constipation if no BM in 48 hours    senna-docusate (PERICOLACE) 8.6-50 mg per tablet Take 1 Tab by mouth daily.  allopurinol (ZYLOPRIM) 100 mg tablet Take 100 mg by mouth daily.  cetirizine (ZYRTEC) 10 mg tablet Take 10 mg by mouth daily as needed for Allergies.  metOLazone (ZAROXOLYN) 5 mg tablet Take 5 mg by mouth daily as needed.  albuterol (PROAIR HFA) 90 mcg/actuation inhaler Take 2 Puffs by inhalation every four (4) hours as needed for Wheezing.  carvedilol (COREG) 25 mg tablet TAKE 1 TABLET BY MOUTH TWICE A DAY    isosorbide mononitrate ER (IMDUR) 120 mg CR tablet TAKE 1 TABLET BY MOUTH EVERY DAY    pantoprazole (PROTONIX) 40 mg tablet TAKE 1 TABLET BY MOUTH EVERY DAY FOR HEARTBURN    hydrOXYzine pamoate (VISTARIL) 25 mg capsule Take 1 Cap by mouth three (3) times daily as needed for Anxiety.  albuterol (PROVENTIL VENTOLIN) 2.5 mg /3 mL (0.083 %) nebulizer solution 2.5 mg by Nebulization route every four (4) hours as needed.  insulin aspart protamine/insulin aspart (NOVOLOG MIX 70-30) 100 unit/mL (70-30) injection by SubCUTAneous route three (3) times daily as needed. Uses sliding scale     ondansetron (ZOFRAN ODT) 4 mg disintegrating tablet Take 1 Tab by mouth every six (6) hours as needed for Nausea.  fluticasone (FLONASE) 50 mcg/actuation nasal spray 2 Sprays by Both Nostrils route nightly as needed.  calcitRIOL (ROCALTROL) 0.25 mcg capsule Take 0.25 mcg by mouth four (4) days a week. Mon, Wed, Fr, Sat    nitroglycerin (NITROSTAT) 0.3 mg SL tablet 1 Tab by SubLINGual route every five (5) minutes as needed for Chest Pain.  ergocalciferol (VITAMIN D2) 50,000 unit capsule Take 50,000 Units by mouth every Tuesday and Thursday.  aspirin 81 mg tablet Take 81 mg by mouth daily.     atorvastatin (LIPITOR) 80 mg tablet Take 80 mg by mouth every evening.  bumetanide (BUMEX) 1 mg tablet Take 1 Tab by mouth two (2) times a day. May take an extra one as needed. No current facility-administered medications for this visit. Physical Examination: General appearance - alert, well appearing, and in no distress  Chest - clear to auscultation, no wheezes, rales or rhonchi, symmetric air entry  Heart - normal rate, regular rhythm, normal S1, S2, no murmurs, rubs, clicks or gallops  Musculoskeletal - chronic leg and diffuse joint pain      Assessment/ Plan:   Diagnoses and all orders for this visit:    1. Chronic deep vein thrombosis (DVT) of distal vein of right lower extremity (HCC)    2. Warfarin anticoagulation  -     AMB POC PT/INR    3. Anemia, unspecified type  -     HGB & HCT    4. Chronic chest pain  -     oxyCODONE-acetaminophen (PERCOCET 7.5) 7.5-325 mg per tablet; Take 1 Tab by mouth every eight (8) hours as needed for Pain. Max Daily Amount: 3 Tabs. 5. Angina, class III (HCC)  -     oxyCODONE-acetaminophen (PERCOCET 7.5) 7.5-325 mg per tablet; Take 1 Tab by mouth every eight (8) hours as needed for Pain. Max Daily Amount: 3 Tabs. Follow-up Disposition:  Return in about 1 month (around 3/22/2018), or if symptoms worsen or fail to improve. I have discussed the diagnosis with the patient and the intended plan as seen in the above orders. The patient has received an after-visit summary and questions were answered concerning future plans. Pt conveyed understanding of plan.     Medication Side Effects and Warnings were discussed with patient      Ravinder Erickson DO

## 2018-02-22 NOTE — PATIENT INSTRUCTIONS

## 2018-02-23 LAB
HCT VFR BLD AUTO: 23.8 % (ref 34–46.6)
HGB BLD-MCNC: 7.9 G/DL (ref 11.1–15.9)

## 2018-03-01 ENCOUNTER — NURSE NAVIGATOR (OUTPATIENT)
Dept: CASE MANAGEMENT | Age: 63
End: 2018-03-01

## 2018-03-01 NOTE — NURSE NAVIGATOR
February 2018 CARDIOMEMS readings:  (the orange readings were read by the system as suspect, most likely due to the low readings - patient is being monitored for \"drift\")

## 2018-03-12 ENCOUNTER — NURSE NAVIGATOR (OUTPATIENT)
Dept: CASE MANAGEMENT | Age: 63
End: 2018-03-12

## 2018-03-20 ENCOUNTER — OFFICE VISIT (OUTPATIENT)
Dept: CARDIOLOGY CLINIC | Age: 63
End: 2018-03-20

## 2018-03-20 ENCOUNTER — HOSPITAL ENCOUNTER (OUTPATIENT)
Dept: GENERAL RADIOLOGY | Age: 63
Discharge: HOME | End: 2018-03-20
Payer: MEDICARE

## 2018-03-20 VITALS
SYSTOLIC BLOOD PRESSURE: 130 MMHG | HEIGHT: 70 IN | OXYGEN SATURATION: 98 % | WEIGHT: 293 LBS | HEART RATE: 66 BPM | DIASTOLIC BLOOD PRESSURE: 62 MMHG | BODY MASS INDEX: 41.95 KG/M2

## 2018-03-20 DIAGNOSIS — Z79.4 TYPE 2 DIABETES MELLITUS WITH STAGE 4 CHRONIC KIDNEY DISEASE, WITH LONG-TERM CURRENT USE OF INSULIN (HCC): Chronic | ICD-10-CM

## 2018-03-20 DIAGNOSIS — I25.118 ATHEROSCLEROSIS OF NATIVE CORONARY ARTERY OF NATIVE HEART WITH STABLE ANGINA PECTORIS (HCC): Chronic | ICD-10-CM

## 2018-03-20 DIAGNOSIS — Z99.89 OSA ON CPAP: Chronic | ICD-10-CM

## 2018-03-20 DIAGNOSIS — I50.32 CHRONIC DIASTOLIC HEART FAILURE (HCC): Primary | Chronic | ICD-10-CM

## 2018-03-20 DIAGNOSIS — Z79.01 WARFARIN ANTICOAGULATION: ICD-10-CM

## 2018-03-20 DIAGNOSIS — G47.33 OSA ON CPAP: Chronic | ICD-10-CM

## 2018-03-20 DIAGNOSIS — E11.22 TYPE 2 DIABETES MELLITUS WITH STAGE 4 CHRONIC KIDNEY DISEASE, WITH LONG-TERM CURRENT USE OF INSULIN (HCC): Chronic | ICD-10-CM

## 2018-03-20 DIAGNOSIS — Z95.9 S/P LEFT PULMONARY ARTERY PRESSURE SENSOR IMPLANT PLACEMENT: ICD-10-CM

## 2018-03-20 DIAGNOSIS — I82.5Z1 CHRONIC DEEP VEIN THROMBOSIS (DVT) OF DISTAL VEIN OF RIGHT LOWER EXTREMITY (HCC): ICD-10-CM

## 2018-03-20 DIAGNOSIS — I10 ESSENTIAL HYPERTENSION: Chronic | ICD-10-CM

## 2018-03-20 DIAGNOSIS — E66.01 MORBID OBESITY (HCC): Chronic | ICD-10-CM

## 2018-03-20 DIAGNOSIS — N18.4 CKD (CHRONIC KIDNEY DISEASE) STAGE 4, GFR 15-29 ML/MIN (HCC): Chronic | ICD-10-CM

## 2018-03-20 DIAGNOSIS — J44.9 COPD, SEVERE (HCC): ICD-10-CM

## 2018-03-20 DIAGNOSIS — N18.4 TYPE 2 DIABETES MELLITUS WITH STAGE 4 CHRONIC KIDNEY DISEASE, WITH LONG-TERM CURRENT USE OF INSULIN (HCC): Chronic | ICD-10-CM

## 2018-03-20 DIAGNOSIS — I27.20 PULMONARY HTN (HCC): Chronic | ICD-10-CM

## 2018-03-20 DIAGNOSIS — J84.9 ILD (INTERSTITIAL LUNG DISEASE) (HCC): ICD-10-CM

## 2018-03-20 DIAGNOSIS — I50.32 CHRONIC DIASTOLIC HEART FAILURE (HCC): Chronic | ICD-10-CM

## 2018-03-20 PROCEDURE — 71046 X-RAY EXAM CHEST 2 VIEWS: CPT

## 2018-03-20 NOTE — PROGRESS NOTES
Visit Vitals    /62 (BP 1 Location: Right arm, BP Patient Position: Sitting)    Pulse 66    Ht 5' 10\" (1.778 m)    Wt 339 lb (153.8 kg)    SpO2 98%    BMI 48.64 kg/m2

## 2018-03-20 NOTE — PROGRESS NOTES
History of Present Illness    Areli Greco is a 63 y.o. female. Last seen 3 months ago.      Problem List  Date Reviewed: 2/22/2018          Codes Class Noted    Angina, class III ([facility] ICD-10-CM: I20.9  ICD-9-CM: 413.9  8/9/2013        Chronic diastolic heart failure (HCC) (Chronic) ICD-10-CM: I50.32  ICD-9-CM: 428.32  10/5/2012        CAD (coronary Artery Disease) Native Artery (Chronic) ICD-10-CM: I25.10  ICD-9-CM: 414.01  2/16/2011    Overview Addendum 10/5/2012  7:33 AM by PRASHANT Hollis     [place] 2004  1v CAD by cath - PCI OM with SAL  Cath 5/2004 - patent stent, no new disease  Lexiscan cardiolite 12/09- normal perfusion, EF 66%  Cath: 3/19/12: PA 55/30 mean 41, wedge 26, RA 24, EDP 35, LVG 55%, LM normal, LAD normal, LCX - OM1 patent stent, OM2 prox 85% long, % with L to R collaterals                Chest pain ICD-10-CM: R07.9  ICD-9-CM: 786.50  11/21/2017        Allergic reaction to contrast dye ICD-10-CM: T50.8X5A  ICD-9-CM: 995.27  11/20/2017        S/P left pulmonary artery pressure sensor implant placement ICD-10-CM: Z95.9  ICD-9-CM: V45.89  8/31/2017    Overview Signed 8/31/2017  9:53 AM by Misty Landry RN     [employer]              ILD (interstitial lung disease) ([facility] ICD-10-CM: J84.9  ICD-9-CM: 515  8/20/2017        Warfarin anticoagulation ICD-10-CM: Z79.01  ICD-9-CM: V58.61  8/20/2017        COPD, severe ([facility] ICD-10-CM: J44.9  ICD-9-CM: 193  6/21/2017        CKD (chronic kidney disease) stage 4, GFR 15-29 ml/min (HCC) (Chronic) ICD-10-CM: N18.4  ICD-9-CM: 585.4  2/10/2017        DVT (deep venous thrombosis) ([facility] ICD-10-CM: I82.409  ICD-9-CM: 453.40  2/2/2017        DM (diabetes mellitus), type 2 with renal complications (HCC) (Chronic) ICD-10-CM: E11.29  ICD-9-CM: 250.40  8/9/2013        Vitamin D deficiency ICD-10-CM: E55.9  ICD-9-CM: 268.9  8/9/2013        Normocytic anemia (Chronic) ICD-10-CM: D64.9  ICD-9-CM: 285.9  5/26/2013        DJD (degenerative joint disease) of knee ICD-10-CM: M17.10  ICD-9-CM: 715.36  10/30/2012        HTN (hypertension) (Chronic) ICD-10-CM: I10  ICD-9-CM: 401.9  10/1/2012        Morbid obesity (Chronic) ICD-10-CM: E66.01  ICD-9-CM: 278.01  10/1/2012        Esophageal reflux ICD-10-CM: K21.9  ICD-9-CM: 530.81  10/1/2012        Gastroparesis ICD-10-CM: K31.84  ICD-9-CM: 536.3  10/1/2012        Pulmonary HTN (Chronic) ICD-10-CM: I27.20  ICD-9-CM: 416.8  3/21/2012        JASEN on CPAP (Chronic) ICD-10-CM: G47.33, Z99.89  ICD-9-CM: 327.23, V46.8  2/16/2011    Overview Signed 3/21/2012  8:04 AM by Riki Rivers, ACNP     Dr. Toma Potts CPAP                   Cardiac Testing  CATH: 2004: 1v CAD by cath - PCI OM with SAL  CATH 5/2004 - patent stent, no new disease   Lexiscan cardiolite 12/09- normal perfusion, EF 66%  ECHO 11/2009: LVH, EF 82%, diastolic dysfunction  STRESS/LEXISCAN/CARDIOLITE 3/29/11: normal perfusion, EF 62%  CATH: 3/20/2012 :PA 55/30 mean 41, wedge 26, RA 24, EDP 35, LVG 55%, LM normal, LAD normal, LCX - OM1 patent stent, OM2 prox 85% long, % w/ L -> R collateral; s/p SAL-> OM2/OM3 with SAL. CATH: 10/4/2012: EDP 25, EF 55%, LM nl, LAD mild plaque, LCX patent OM stents, % prox with good L to R collaterals. Cath 3/18/2014 - RA 9 PA 42/19/29, PCW 12, EDP 25, no LVG due to CKD, LM nl, LAD mild plaque, LCX patent stents OM1/OM2, RCA chronic proximal occlusion with L to R collaterals. ECHO 4/11/16: EF 60-65%. No WMA. LA mildly dilated. Lexiscan Cardiolite 4/11/16: Normal. LVEF 55%. Compared to 3/29/11, no significant changes. RHC/CardioMEMS implant 8/31/17 - RA 12, PA 56/20/30, CI (Mayte) 2.65    Echo 11/30/17 - EF 65%. G1DD. No WMA. Wall thickness was mildly to moderately increased. Limited Echo 12/13/17 - Tricuspid valve: Pulmonary artery diastolic pressure: 56.45 mmHg. HPI    Mrs. Arabella Cao says she has been feeling \"okay\" other than lower extremity edema and bilateral hand swelling.  The fluid in her legs used to resolve overnight when she slept but now it stays in her legs overnight. Getting Procrit every 2 weeks under Dr. Montana Lucero supervision. Recent hemoglobin was 8. She sleeps with multiple pillows for support. She continues to experience stable dyspnea and chest tightness with minimal activity. Easy fatiguability. Patient denies any exertional chest pain, palpitations, syncope, or paroxysmal nocturnal dyspnea. She is doing well on CPAP. She is here with her  today. Current Outpatient Prescriptions on File Prior to Visit   Medication Sig Dispense Refill    oxyCODONE-acetaminophen (PERCOCET 7.5) 7.5-325 mg per tablet Take 1 Tab by mouth every eight (8) hours as needed for Pain. Max Daily Amount: 3 Tabs. 90 Tab 0    bumetanide (BUMEX) 1 mg tablet Take 1 Tab by mouth two (2) times a day. May take an extra one as needed. 60 Tab 3    warfarin (COUMADIN) 2 mg tablet Take 1 Tab by mouth Every Friday. And saturday 30 Tab 1    warfarin (COUMADIN) 3 mg tablet Take 1 Tab by mouth five (5) days a week. Sun-Thurs 30 Tab 1    amLODIPine (NORVASC) 5 mg tablet Take 1 Tab by mouth daily. (Patient taking differently: Take 10 mg by mouth daily.) 30 Tab 0    polyethylene glycol (MIRALAX) 17 gram packet Take 1 Packet by mouth daily as needed. For constipation if no BM in 48 hours 30 Packet 0    allopurinol (ZYLOPRIM) 100 mg tablet Take 100 mg by mouth daily.  albuterol (PROAIR HFA) 90 mcg/actuation inhaler Take 2 Puffs by inhalation every four (4) hours as needed for Wheezing.  carvedilol (COREG) 25 mg tablet TAKE 1 TABLET BY MOUTH TWICE A  Tab 3    isosorbide mononitrate ER (IMDUR) 120 mg CR tablet TAKE 1 TABLET BY MOUTH EVERY DAY 90 Tab 3    pantoprazole (PROTONIX) 40 mg tablet TAKE 1 TABLET BY MOUTH EVERY DAY FOR HEARTBURN 90 Tab 2    albuterol (PROVENTIL VENTOLIN) 2.5 mg /3 mL (0.083 %) nebulizer solution 2.5 mg by Nebulization route every four (4) hours as needed.       fluticasone (FLONASE) 50 mcg/actuation nasal spray 2 Sprays by Both Nostrils route nightly as needed.  calcitRIOL (ROCALTROL) 0.25 mcg capsule Take 0.25 mcg by mouth four (4) days a week. Mon, Wed, Fr, Sat      nitroglycerin (NITROSTAT) 0.3 mg SL tablet 1 Tab by SubLINGual route every five (5) minutes as needed for Chest Pain. 1 Bottle 1    ergocalciferol (VITAMIN D2) 50,000 unit capsule Take 50,000 Units by mouth every Tuesday and Thursday.  aspirin 81 mg tablet Take 81 mg by mouth daily.  atorvastatin (LIPITOR) 80 mg tablet Take 80 mg by mouth every evening.  ferrous gluconate 324 mg (38 mg iron) tablet Take 1 Tab by mouth daily (with breakfast). 30 Tab 0    senna-docusate (PERICOLACE) 8.6-50 mg per tablet Take 1 Tab by mouth daily. 30 Tab 0    cetirizine (ZYRTEC) 10 mg tablet Take 10 mg by mouth daily as needed for Allergies.  metOLazone (ZAROXOLYN) 5 mg tablet Take 5 mg by mouth daily as needed.  hydrOXYzine pamoate (VISTARIL) 25 mg capsule Take 1 Cap by mouth three (3) times daily as needed for Anxiety. 30 Cap 1    insulin aspart protamine/insulin aspart (NOVOLOG MIX 70-30) 100 unit/mL (70-30) injection by SubCUTAneous route three (3) times daily as needed. Uses sliding scale       ondansetron (ZOFRAN ODT) 4 mg disintegrating tablet Take 1 Tab by mouth every six (6) hours as needed for Nausea. 30 Tab 0     No current facility-administered medications on file prior to visit. Allergies   Allergen Reactions    Contrast Dye [Iodine] Anaphylaxis    Levaquin [Levofloxacin] Nausea and Vomiting    Morphine Hives and Itching     Lives in [facility]. Review of Systems  Constitutional: Negative for fever, chills, malaise/fatigue and diaphoresis. +weight gain  Respiratory: Negative for cough, hemoptysis, sputum production, and wheezing. +LARA. Cardiovascular: Negative for palpitations, orthopnea, claudication, and PND.  +chest tightness, +LE edema R>L  Gastrointestinal: Negative for nausea, vomiting, blood in stool and melena. +abdominal bloating  Genitourinary: Negative for dysuria and flank pain. Musculoskeletal: Negative for joint pain and back pain. Skin: Negative for rash. Neurological: Negative for focal weakness, seizures, loss of consciousness, weakness. Endo/Heme/Allergies: Does not bruise/bleed easily. Psychiatric/Behavioral: Negative for memory loss. The patient does not have insomnia. Visit Vitals    /62 (BP 1 Location: Right arm, BP Patient Position: Sitting)    Pulse 66    Ht 5' 10\" (1.778 m)    Wt 339 lb (153.8 kg)    SpO2 98%    BMI 48.64 kg/m2     Wt Readings from Last 3 Encounters:   03/20/18 339 lb (153.8 kg)   02/22/18 339 lb (153.8 kg)   01/24/18 341 lb (154.7 kg)       Physical Exam  General - well developed well nourished, morbidly obese BF  Neck - JVP normal, thyroid normal  Cardiac - normal S1,S2, no murmurs, rubs or gallops. No clicks  Vascular - carotids without bruits, radials, femorals and pedal pulses equal bilateral  Lungs - clear to auscultation bilaterals, no rales, wheezing or rhonchi  Abd - soft nontender, no HSM, no abd bruits  Extremities -1-2+ LE edema, R>L  Skin - no rash  Neuro - nonfocal  Psych - normal mood and affect    Cardiographics  EKG 2/14 - Normal sinus rhythm, low voltage, otherwise normal EKG  Echo 02/04/17- LVEF 75%     ASSESSMENT and PLAN  Encounter Diagnoses   Name Primary?     Chronic diastolic heart failure (HCC) Yes    Essential hypertension     Atherosclerosis of native coronary artery of native heart with stable angina pectoris (HCC)     Chronic deep vein thrombosis (DVT) of distal vein of right lower extremity ([facility])     CKD (chronic kidney disease) stage 4, GFR 15-29 ml/min ([facility])     Type 2 diabetes mellitus with stage 4 chronic kidney disease, with long-term current use of insulin ([facility])     Morbid obesity     JASEN on CPAP     S/P left pulmonary artery pressure sensor implant placement     Pulmonary HTN     ILD (interstitial lung disease) ([facility]     COPD, severe (HCC)     Warfarin anticoagulation      Mrs. Purnima Corona has class 3+ LARA due to ILD, CAD, and diastolic dysfunction. Although her dyspnea is not worse her edema is clearly increased. Her cardioMEMS device is still suffering from \"drift. \" Her numbers are not meaningful at this time. I am in communication with the company regarding the timing of recalibration with right heart cath. Will increase Bumex to 2 mg BID and see how she does. Will get a chest xray to see if we should localized her device due to the patient's complaints of left chest \"pinching. \"    Her anemia is marginally better with Procrit. CKD monitored by Dr. Lila Huitron. Chronic anticoagulation for DVT. Continue close monitoring. Follow-up Disposition:  Return in about 3 months (around 6/20/2018).     Written by Paul Ugarte, as dictated by Josue Portillo MD.   Josue Portillo MD

## 2018-03-20 NOTE — MR AVS SNAPSHOT
[address] [facility] [address] [facility] 
[phone] Patient: Karl Randhawa MRN: [id number] Visit Information Date & Time Provider Department Dept. Phone Encounter #  
 3/20/2018  4:00 PM Jt Foster MD [facility] [facility] [phone] [id number] Follow-up Instructions Return in about 3 months (around 6/20/2018). Upcoming Health Maintenance Date Due  
 FOOT EXAM Q1 12/14/1967 MICROALBUMIN Q1 12/14/1967 EYE EXAM RETINAL OR DILATED Q1 12/14/1967 Pneumococcal 19-64 Medium Risk (1 of 1 - PPSV23) 12/14/1976 DTaP/Tdap/Td series (1 - Tdap) 12/14/1978 PAP AKA CERVICAL CYTOLOGY 12/14/1978 BREAST CANCER SCRN MAMMOGRAM 12/14/2007 LIPID PANEL Q1 11/9/2015 ZOSTER VACCINE AGE 60> 10/14/2017 FOBT Q 1 YEAR AGE 50-75 2/18/2018 MEDICARE YEARLY EXAM 5/18/2018 HEMOGLOBIN A1C Q6M 5/22/2018 Allergies as of 3/20/2018  Review Complete On: 3/20/2018 By: Efra Garcia  
  
 Severity Noted Reaction Type Reactions Contrast Dye [Iodine] High 02/28/2011   Systemic Anaphylaxis Levaquin [Levofloxacin]  10/13/2013    Nausea and Vomiting Morphine  02/28/2011   Side Effect Hives, Itching Current Immunizations  Never Reviewed Name Date Influenza Vaccine 12/5/2017 Not reviewed this visit You Were Diagnosed With   
  
 Codes Comments Chronic diastolic heart failure (HCC)    -  Primary ICD-10-CM: I50.32 
ICD-9-CM: 428.32 Essential hypertension     ICD-10-CM: I10 
ICD-9-CM: 401.9 Atherosclerosis of native coronary artery of native heart with stable angina pectoris ([facility]     ICD-10-CM: I25.118 
ICD-9-CM: 414.01, 413.9 Chronic deep vein thrombosis (DVT) of distal vein of right lower extremity ([facility])     ICD-10-CM: F18.3I6 ICD-9-CM: 453.52 CKD (chronic kidney disease) stage 4, GFR 15-29 ml/min ([facility])     ICD-10-CM: N18.4 ICD-9-CM: 737. 4 Type 2 diabetes mellitus with stage 4 chronic kidney disease, with long-term current use of insulin (HCC)     ICD-10-CM: E11.22, N18.4, Z79.4 ICD-9-CM: 250.40, 585.4, V58.67 Morbid obesity (HCC)     ICD-10-CM: E66.01 
ICD-9-CM: 278.01   
 JASEN on CPAP     ICD-10-CM: G47.33, Z99.89 ICD-9-CM: 327.23, V46.8 Vitals BP Pulse Height(growth percentile) Weight(growth percentile) SpO2 BMI  
 130/62 (BP 1 Location: Right arm, BP Patient Position: Sitting) 66 5' 10\" (1.778 m) 339 lb (153.8 kg) 98% 48.64 kg/m2 OB Status Smoking Status Hysterectomy Former Smoker Vitals History BMI and BSA Data Body Mass Index Body Surface Area  
 48.64 kg/m 2 2.76 m 2 Preferred Pharmacy Pharmacy Name Phone [id number] [phone] Your Updated Medication List  
  
   
This list is accurate as of 3/20/18  4:50 PM.  Always use your most recent med list.  
  
  
  
  
 * PROAIR HFA 90 mcg/actuation inhaler Generic drug:  albuterol Take 2 Puffs by inhalation every four (4) hours as needed for Wheezing. * albuterol 2.5 mg /3 mL (0.083 %) nebulizer solution Commonly known as:  PROVENTIL VENTOLIN  
2.5 mg by Nebulization route every four (4) hours as needed. allopurinol 100 mg tablet Commonly known as:  Ravinder Banegas Take 100 mg by mouth daily. amLODIPine 5 mg tablet Commonly known as:  Vivien Luciano Take 1 Tab by mouth daily. aspirin 81 mg tablet Take 81 mg by mouth daily. atorvastatin 80 mg tablet Commonly known as:  LIPITOR Take 80 mg by mouth every evening. bumetanide 1 mg tablet Commonly known as:  Pippa Lorenzana Take 1 Tab by mouth two (2) times a day. May take an extra one as needed. calcitRIOL 0.25 mcg capsule Commonly known as:  ROCALTROL Take 0.25 mcg by mouth four (4) days a week. Mon, Wed, Fr, Sat  
  
 carvedilol 25 mg tablet Commonly known as:  COREG  
TAKE 1 TABLET BY MOUTH TWICE A DAY  
  
 ferrous gluconate 324 mg (38 mg iron) tablet Take 1 Tab by mouth daily (with breakfast). FLONASE 50 mcg/actuation nasal spray Generic drug:  fluticasone 2 Sprays by Both Nostrils route nightly as needed. hydrOXYzine pamoate 25 mg capsule Commonly known as:  VISTARIL Take 1 Cap by mouth three (3) times daily as needed for Anxiety. isosorbide mononitrate  mg CR tablet Commonly known as:  IMDUR  
TAKE 1 TABLET BY MOUTH EVERY DAY  
  
 metOLazone 5 mg tablet Commonly known as:  Dedra Emmanuel Take 5 mg by mouth daily as needed. nitroglycerin 0.3 mg SL tablet Commonly known as:  NITROSTAT  
1 Tab by SubLINGual route every five (5) minutes as needed for Chest Pain. NovoLOG Mix 70-30 U-100 Insuln 100 unit/mL (70-30) injection Generic drug:  insulin aspart protamine/insulin aspart  
by SubCUTAneous route three (3) times daily as needed. Uses sliding scale  
  
 ondansetron 4 mg disintegrating tablet Commonly known as:  ZOFRAN ODT Take 1 Tab by mouth every six (6) hours as needed for Nausea. oxyCODONE-acetaminophen 7.5-325 mg per tablet Commonly known as:  PERCOCET 7.5 Take 1 Tab by mouth every eight (8) hours as needed for Pain. Max Daily Amount: 3 Tabs. pantoprazole 40 mg tablet Commonly known as:  PROTONIX  
TAKE 1 TABLET BY MOUTH EVERY DAY FOR HEARTBURN  
  
 polyethylene glycol 17 gram packet Commonly known as:  Ernestene Score Take 1 Packet by mouth daily as needed. For constipation if no BM in 48 hours  
  
 senna-docusate 8.6-50 mg per tablet Commonly known as:  Rhunette Anon Take 1 Tab by mouth daily. VITAMIN D2 50,000 unit capsule Generic drug:  ergocalciferol Take 50,000 Units by mouth every Tuesday and Thursday. * warfarin 3 mg tablet Commonly known as:  COUMADIN Take 1 Tab by mouth five (5) days a week. Sun-Thurs * warfarin 2 mg tablet Commonly known as:  COUMADIN Take 1 Tab by mouth Every Friday. And saturday ZyrTEC 10 mg tablet Generic drug:  cetirizine Take 10 mg by mouth daily as needed for Allergies. * Notice: This list has 4 medication(s) that are the same as other medications prescribed for you. Read the directions carefully, and ask your doctor or other care provider to review them with you. Follow-up Instructions Return in about 3 months (around 6/20/2018). Patient Instructions Increase Bumex to 2 mg twice daily. Introducing [facility]! Dear Charlotte Cristobal: Thank you for requesting a [employer] account. Our records indicate that you already have an active [employer] account. You can access your account anytime at [email or web address] Did you know that you can access your hospital and ER discharge instructions at any time in [employer]? You can also review all of your test results from your hospital stay or ER visit. Additional Information If you have questions, please visit the Frequently Asked Questions section of the [employer] website at [email or web address] Remember, [employer] is NOT to be used for urgent needs. For medical emergencies, dial 911. Now available from your iPhone and Android! Please provide this summary of care documentation to your next provider. Your primary care clinician is listed as Magi Barfield. If you have any questions after today's visit, please call [phone]. normal...

## 2018-03-23 ENCOUNTER — TELEPHONE (OUTPATIENT)
Dept: CARDIOLOGY CLINIC | Age: 63
End: 2018-03-23

## 2018-03-23 NOTE — TELEPHONE ENCOUNTER
Ms. Leilani Palomo denies any interval change in weight or dyspnea since Bumex was increased from 1 mg daily to 1 mg BID. We will plan to continue 1 mg BID through the weekend and re-group again on Monday. At that time, if she remains at this weight with no change in sxs, we will consider a further dose increase.

## 2018-03-23 NOTE — TELEPHONE ENCOUNTER
XR Results (most recent):    Results from Hospital Encounter encounter on 03/20/18   XR CHEST PA LAT   Narrative INDICATION:  LARA, localization of cardioMEMS device left PA. Chronic congestive  heart failure. EXAM: 2 VIEW CHEST RADIOGRAPH. COMPARISON: 2/28/2017. FINDINGS: Frontal and lateral views of the chest show increased opacification of  lung parenchyma bilaterally, in the right upper lobe, right lower lobe and left  mid and lower lung fields, suggesting acute edema or infiltrate superimposed on  chronic interstitial disease. No pleural effusion. . . The heart, mediastinum and  pulmonary vasculature are stable. The bony thorax is unremarkable for age. ..         Impression IMPRESSION:  Acute worsening of bilateral patchy infiltrate or edema, as described above. .   . I have left a voicemail for Ms. Gonsales to review xray data. Diuretic regimen was increased to 2 mg BID on 3/20/18 (the day of imaging) by Dr. Armida Beckwith. Plan to assess her response and determine a plan moving forward.

## 2018-03-24 RX ORDER — AMLODIPINE BESYLATE 5 MG/1
10 TABLET ORAL DAILY
Qty: 30 TAB | Refills: 5
Start: 2018-03-24 | End: 2018-05-30 | Stop reason: DRUGHIGH

## 2018-04-03 ENCOUNTER — OFFICE VISIT (OUTPATIENT)
Dept: FAMILY MEDICINE CLINIC | Age: 63
End: 2018-04-03

## 2018-04-03 VITALS
RESPIRATION RATE: 16 BRPM | HEIGHT: 70 IN | BODY MASS INDEX: 41.95 KG/M2 | OXYGEN SATURATION: 96 % | WEIGHT: 293 LBS | SYSTOLIC BLOOD PRESSURE: 148 MMHG | HEART RATE: 78 BPM | DIASTOLIC BLOOD PRESSURE: 79 MMHG | TEMPERATURE: 97.4 F

## 2018-04-03 DIAGNOSIS — Z79.01 WARFARIN ANTICOAGULATION: Primary | ICD-10-CM

## 2018-04-03 DIAGNOSIS — I20.9 ANGINA, CLASS III (HCC): ICD-10-CM

## 2018-04-03 DIAGNOSIS — G89.29 CHRONIC CHEST PAIN: ICD-10-CM

## 2018-04-03 DIAGNOSIS — M79.605 LEFT LEG PAIN: ICD-10-CM

## 2018-04-03 DIAGNOSIS — R07.9 CHRONIC CHEST PAIN: ICD-10-CM

## 2018-04-03 LAB
INR BLD: 3
PT POC: 35.8 SECONDS
VALID INTERNAL CONTROL?: YES

## 2018-04-03 RX ORDER — OXYCODONE AND ACETAMINOPHEN 7.5; 325 MG/1; MG/1
1 TABLET ORAL
Qty: 90 TAB | Refills: 0 | Status: SHIPPED | OUTPATIENT
Start: 2018-04-03 | End: 2018-05-30 | Stop reason: DRUGHIGH

## 2018-04-03 NOTE — PATIENT INSTRUCTIONS

## 2018-04-03 NOTE — PROGRESS NOTES
1. Have you been to the ER, urgent care clinic since your last visit? Hospitalized since your last visit? No    2. Have you seen or consulted any other health care providers outside of the [address] [place] [address] since your last visit? Include any pap smears or colon screening.  No     Chief Complaint   Patient presents with    Coagulation disorder

## 2018-04-03 NOTE — PROGRESS NOTES
Bryce Carey is a 63 y.o. female   Chief Complaint   Patient presents with    Coagulation disorder    pt here for recheck of her INR and is currently at 3.0. Pt is otherwise doing well. Pt is getting her procrit and her hgb went up ambrosio 8 when recently checked. Pt also had some fluid build up when she saw cardio and fluid pill was increased. Pt remains unable to have her angio done due to her anemia. Pt also with worsening pain in her L calf and would like to get a doppler to see if there is a dvt since she is obviously prone to these, this pt is currently therapeutic with her coumadin. Pt has held steady at 3.0 for past 2 months now. Pt would also like refill of her pain medication for her chronic chest pain. Pain is a 8/10 without med and a 3-5/10 with med. Opioid/Pain Management:    1. Has the patient signed a pain contract for chronic narcotic use? yes  2. Has the patient had a UDS or Serum screen as per guidelines as per [facility]?:   yes    3. Does patient meet necessary guidelines for Naloxone treatement per the [facility]?:    no  4. Has the Prescription Monitoring Program been reviewed? yes  5. Does patient have a long term condition that requires long term use of a Narcotic?  yes  6. Has patient been tolerant of therapy and responsible to routine follow up and specialist follow-up? Yes      Pt brother in law passed away and she will be driving to [place] and pt knows it would be better for her not to go but is going either way so we have discussed frequently stopping at least every hour for 10-15 minutes getting up and moving around. she is a 63y.o. year old female who presents for evalution. Reviewed PmHx, RxHx, FmHx, SocHx, AllgHx and updated and dated in the chart.     Review of Systems - negative except as listed above in the HPI    Objective:     Vitals:    04/03/18 1402   BP: 148/79   Pulse: 78   Resp: 16   Temp: 97.4 °F (36.3 °C)   TempSrc: Oral SpO2: 96%   Weight: 337 lb 3.2 oz (153 kg)   Height: 5' 10\" (1.778 m)       Current Outpatient Prescriptions   Medication Sig    oxyCODONE-acetaminophen (PERCOCET 7.5) 7.5-325 mg per tablet Take 1 Tab by mouth every eight (8) hours as needed for Pain. Max Daily Amount: 3 Tabs.  amLODIPine (NORVASC) 5 mg tablet Take 2 Tabs by mouth daily.  bumetanide (BUMEX) 1 mg tablet Take 1 Tab by mouth two (2) times a day. May take an extra one as needed.  warfarin (COUMADIN) 3 mg tablet Take 1 Tab by mouth five (5) days a week. Sun-Thurs    ferrous gluconate 324 mg (38 mg iron) tablet Take 1 Tab by mouth daily (with breakfast).  polyethylene glycol (MIRALAX) 17 gram packet Take 1 Packet by mouth daily as needed. For constipation if no BM in 48 hours    allopurinol (ZYLOPRIM) 100 mg tablet Take 100 mg by mouth daily.  metOLazone (ZAROXOLYN) 5 mg tablet Take 5 mg by mouth daily as needed.  albuterol (PROAIR HFA) 90 mcg/actuation inhaler Take 2 Puffs by inhalation every four (4) hours as needed for Wheezing.  carvedilol (COREG) 25 mg tablet TAKE 1 TABLET BY MOUTH TWICE A DAY    isosorbide mononitrate ER (IMDUR) 120 mg CR tablet TAKE 1 TABLET BY MOUTH EVERY DAY    pantoprazole (PROTONIX) 40 mg tablet TAKE 1 TABLET BY MOUTH EVERY DAY FOR HEARTBURN    hydrOXYzine pamoate (VISTARIL) 25 mg capsule Take 1 Cap by mouth three (3) times daily as needed for Anxiety.  albuterol (PROVENTIL VENTOLIN) 2.5 mg /3 mL (0.083 %) nebulizer solution 2.5 mg by Nebulization route every four (4) hours as needed.  insulin aspart protamine/insulin aspart (NOVOLOG MIX 70-30) 100 unit/mL (70-30) injection by SubCUTAneous route three (3) times daily as needed. Uses sliding scale     ondansetron (ZOFRAN ODT) 4 mg disintegrating tablet Take 1 Tab by mouth every six (6) hours as needed for Nausea.  fluticasone (FLONASE) 50 mcg/actuation nasal spray 2 Sprays by Both Nostrils route nightly as needed.     calcitRIOL (ROCALTROL) 0.25 mcg capsule Take 0.25 mcg by mouth four (4) days a week. Mon, Wed, Fr, Sat    nitroglycerin (NITROSTAT) 0.3 mg SL tablet 1 Tab by SubLINGual route every five (5) minutes as needed for Chest Pain.  ergocalciferol (VITAMIN D2) 50,000 unit capsule Take 50,000 Units by mouth every Tuesday and Thursday.  aspirin 81 mg tablet Take 81 mg by mouth daily.  atorvastatin (LIPITOR) 80 mg tablet Take 80 mg by mouth every evening.  warfarin (COUMADIN) 2 mg tablet Take 1 Tab by mouth Every Friday. And saturday    senna-docusate (PERICOLACE) 8.6-50 mg per tablet Take 1 Tab by mouth daily.  cetirizine (ZYRTEC) 10 mg tablet Take 10 mg by mouth daily as needed for Allergies. No current facility-administered medications for this visit. Physical Examination: General appearance - alert, well appearing, and in no distress  Eyes - pupils equal and reactive, extraocular eye movements intact  Chest - clear to auscultation, no wheezes, rales or rhonchi, symmetric air entry  Heart - normal rate, regular rhythm, normal S1, S2, no murmurs, rubs, clicks or gallops  Extremities - L calf ttp      Assessment/ Plan:   Diagnoses and all orders for this visit:    1. Warfarin anticoagulation  -     AMB POC PT/INR    2. Chronic chest pain  -     oxyCODONE-acetaminophen (PERCOCET 7.5) 7.5-325 mg per tablet; Take 1 Tab by mouth every eight (8) hours as needed for Pain. Max Daily Amount: 3 Tabs. 3. Angina, class III (HCC)  -     oxyCODONE-acetaminophen (PERCOCET 7.5) 7.5-325 mg per tablet; Take 1 Tab by mouth every eight (8) hours as needed for Pain. Max Daily Amount: 3 Tabs. 4. Left leg pain  -     DUPLEX LOWER EXT VENOUS LEFT; Future       Follow-up Disposition:  Return in about 1 month (around 5/3/2018), or if symptoms worsen or fail to improve. I have discussed the diagnosis with the patient and the intended plan as seen in the above orders.   The patient has received an after-visit summary and questions were answered concerning future plans. Pt conveyed understanding of plan.     Medication Side Effects and Warnings were discussed with patient      Dalila Templeton,

## 2018-04-04 RX ORDER — BUMETANIDE 2 MG/1
2 TABLET ORAL 2 TIMES DAILY
Qty: 60 TAB | Refills: 1 | OUTPATIENT
Start: 2018-04-04

## 2018-04-04 NOTE — TELEPHONE ENCOUNTER
Pt called stating that since her dosage was increased for this medication she has run out and the pharmacy will not refill the prescription without a new prescription. Pharmacy verified. Requested Prescriptions     Pending Prescriptions Disp Refills    bumetanide (BUMEX) 1 mg tablet 60 Tab 3     Sig: Take 1 Tab by mouth two (2) times a day. May take an extra one as needed. Thanks!   Martha Melendez

## 2018-04-05 RX ORDER — BUMETANIDE 1 MG/1
1 TABLET ORAL 2 TIMES DAILY
Qty: 60 TAB | Refills: 3 | Status: CANCELLED | OUTPATIENT
Start: 2018-04-05

## 2018-04-05 RX ORDER — BUMETANIDE 2 MG/1
2 TABLET ORAL 2 TIMES DAILY
Qty: 60 TAB | Refills: 4 | Status: SHIPPED | OUTPATIENT
Start: 2018-04-05 | End: 2018-05-30 | Stop reason: DRUGHIGH

## 2018-04-09 ENCOUNTER — TELEPHONE (OUTPATIENT)
Dept: CASE MANAGEMENT | Age: 63
End: 2018-04-09

## 2018-04-09 NOTE — TELEPHONE ENCOUNTER
TC to patient to ensure she has been able to get a new rx of bumex. She states she reached the on call cardiology last week since she did not get a call back and does have her bumex in new dosage. She states her last dosage from Dr Luly Brown is bumex 2 mg BID. Patient did do a Cardiomems reading and previous calls related to bumex noted while reviewing her reading. Encouraged patient to continue to do CM readings since the clinical team at [address] [place] [address] Avenue reviews her readings to determine timing for re calibration of device.

## 2018-04-12 ENCOUNTER — NURSE NAVIGATOR (OUTPATIENT)
Dept: CASE MANAGEMENT | Age: 63
End: 2018-04-12

## 2018-04-13 DIAGNOSIS — D68.9 COAGULATION DISORDER (HCC): ICD-10-CM

## 2018-04-13 RX ORDER — WARFARIN 3 MG/1
3 TABLET ORAL
Qty: 30 TAB | Refills: 11 | Status: SHIPPED | OUTPATIENT
Start: 2018-04-13 | End: 2018-05-30 | Stop reason: DRUGHIGH

## 2018-04-13 RX ORDER — WARFARIN 2 MG/1
2 TABLET ORAL
Qty: 30 TAB | Refills: 11 | Status: SHIPPED | OUTPATIENT
Start: 2018-04-13 | End: 2018-05-30 | Stop reason: DRUGHIGH

## 2018-05-01 ENCOUNTER — NURSE NAVIGATOR (OUTPATIENT)
Dept: CASE MANAGEMENT | Age: 63
End: 2018-05-01

## 2018-05-09 DIAGNOSIS — M10.372 ACUTE GOUT DUE TO RENAL IMPAIRMENT INVOLVING LEFT FOOT: ICD-10-CM

## 2018-05-10 ENCOUNTER — TELEPHONE (OUTPATIENT)
Dept: CARDIOLOGY CLINIC | Age: 63
End: 2018-05-10

## 2018-05-10 ENCOUNTER — NURSE NAVIGATOR (OUTPATIENT)
Dept: CASE MANAGEMENT | Age: 63
End: 2018-05-10

## 2018-05-10 RX ORDER — ALLOPURINOL 100 MG/1
TABLET ORAL
Qty: 30 TAB | Refills: 5 | Status: SHIPPED | OUTPATIENT
Start: 2018-05-10 | End: 2018-05-30 | Stop reason: SDUPTHER

## 2018-05-10 NOTE — TELEPHONE ENCOUNTER
I have left a voicemail for Ms. Lakesha Bearded. Dr. Mauricia Goldberg would like to schedule [facility] in the near future to re calibrate CardioMEMS device.

## 2018-05-10 NOTE — NURSE NAVIGATOR
5/8/18 1200:  Email received from Wakie/Budist, Deedee, regarding Cardiomems device. The [employer] Technical HF Team following Ms Lynn Javier device to evaluate status r/t the drift she has been experiencing have indicated that her sensor  ([id number]) now appears stable and recommend checking/recalibrating with [facility].

## 2018-05-11 ENCOUNTER — TELEPHONE (OUTPATIENT)
Dept: CARDIOLOGY CLINIC | Age: 63
End: 2018-05-11

## 2018-05-11 NOTE — TELEPHONE ENCOUNTER
Pt called returning Virginia's call from yesterday. Pt would like a return call at [phone].   Thanks

## 2018-05-15 ENCOUNTER — OFFICE VISIT (OUTPATIENT)
Dept: FAMILY MEDICINE CLINIC | Age: 63
End: 2018-05-15

## 2018-05-15 VITALS
BODY MASS INDEX: 41.95 KG/M2 | HEIGHT: 70 IN | WEIGHT: 293 LBS | SYSTOLIC BLOOD PRESSURE: 115 MMHG | OXYGEN SATURATION: 91 % | TEMPERATURE: 98.4 F | HEART RATE: 64 BPM | RESPIRATION RATE: 16 BRPM | DIASTOLIC BLOOD PRESSURE: 57 MMHG

## 2018-05-15 DIAGNOSIS — R14.0 ABDOMINAL BLOATING: ICD-10-CM

## 2018-05-15 DIAGNOSIS — Z79.01 WARFARIN ANTICOAGULATION: Primary | ICD-10-CM

## 2018-05-15 LAB
INR BLD: 3.1
PT POC: 37 SECONDS
VALID INTERNAL CONTROL?: YES

## 2018-05-15 NOTE — PROGRESS NOTES
1. Have you been to the ER, urgent care clinic since your last visit? Hospitalized since your last visit? No    2. Have you seen or consulted any other health care providers outside of the [facility] since your last visit? Include any pap smears or colon screening. No       Chief Complaint   Patient presents with    Coagulation disorder    Eye Pain     Pt states left eye redness x1wk. Pt states eye is always itchy.  Leg Pain    Abdominal Pain     Pt states pain getting worse past 3 wks. Pt denies any urinary symptoms.      Visit Vitals    /57 (BP 1 Location: Right arm, BP Patient Position: Sitting)    Pulse 64    Temp 98.4 °F (36.9 °C) (Oral)    Resp 16    Ht 5' 10\" (1.778 m)    Wt 335 lb 6.4 oz (152.1 kg)    SpO2 91%    BMI 48.12 kg/m2

## 2018-05-15 NOTE — PATIENT INSTRUCTIONS
Anticoagulants: After Your Visit  Your Care Instructions  Your doctor prescribed an anticoagulant medicine. Anticoagulants, often called blood thinners, prevent new blood clots from forming and keep existing clots from getting larger. They do not actually thin the blood, but they make the blood take longer to clot. This lowers the risk of a blood clot moving to the lungs (pulmonary embolism) or moving to the brain and causing a stroke. Blood thinners come in two forms. Heparin is given by shot, either under your skin or through a needle in your vein, and starts working right away. Warfarin (Coumadin) comes in pill form and takes longer to work. Your doctor may have you begin taking both forms at the same time. As soon as the pills start to work, you will stop the shots but continue to take the pills. If you have a blood clot in your leg, you may need to take warfarin for several months. People who have heart conditions such as atrial fibrillation often need to take it for the rest of their lives. The right dose of this medicine is different for each person. You will need regular blood tests to see if your dose is correct. Follow-up care is a key part of your treatment and safety. Be sure to make and go to all appointments, and call your doctor if you are having problems. Itâs also a good idea to know your test results and keep a list of the medicines you take. How do you take blood thinners? · Take your medicines exactly as prescribed. Call your doctor if you think you are having a problem with your medicine. · Call your doctor if you are not sure what to do if you missed a dose of blood thinner. ¨ Your doctor can tell you exactly what to do so you do not take too much or too little blood thinner. Then you will be as safe as possible. · Some general rules for what to do if you miss a dose:  ¨ If you remember it in the same day, take the missed dose. Then go back to your regular schedule.   ¨ If it is the next day, or almost time to take the next dose, do not take the missed dose. Do not double the dose to make up for the missed one. At your next regularly scheduled time, take your normal dose. ¨ If you miss your dose for 2 or more days, call your doctor. · To help you stay on schedule, use a calendar to remind you when to take your blood thinner. When you take the medicine, note it on the calendar. · If you are going to give yourself shots, your doctor will give you instructions for how to safely inject the medicine. Follow the directions carefully. · Do not take any vitamins, over-the-counter medicines, or herbal products without talking to your doctor first.  · Avoid contact sports and other activities that could lead to injury. Make your home safe and take other measures to reduce your risk of falling. Always wear a seat belt while in a car. · Do not suddenly change your intake of vitamin Kârich foods, such as broccoli, cabbage, asparagus, lettuce, and spinach. This will help blood thinners work evenly from day to day. · Limit alcohol to 2 drinks a day for men and 1 drink a day for women. Alcohol may interfere with blood thinners. It also increases your risk of falls, which can cause bruising and bleeding. · Tell your dentist, pharmacist, and other health professionals that you take blood thinners. Wear medical alert jewelry that says you take blood thinners. You can buy this at most drugstores. When should you call for help? Call 911 anytime you think you may need emergency care. For example, call if:  · You cough up blood. · You vomit blood or what looks like coffee grounds. · You pass maroon or very bloody stools. Call your doctor now or seek immediate medical care if:  · You have new bruises or blood spots under your skin. · You have a nosebleed. · Your gums bleed when you brush your teeth. · You have blood in your urine. · Your stools are black and tarlike or have streaks of blood.   · You have vaginal bleeding when you are not having your period, or heavy period bleeding. Watch closely for changes in your health, and be sure to contact your doctor if:  · You have questions about your medicine. Where can you learn more? Go to [employer]  Enter C236 in the search box to learn more about \"Anticoagulants: After Your Visit. \"   Â© 0478-8813 Healthwise, Incorporated. Care instructions adapted under license by [address] [place] [address] (which disclaims liability or warranty for this information). This care instruction is for use with your licensed healthcare professional. If you have questions about a medical condition or this instruction, always ask your healthcare professional. [place] [address] any warranty or liability for your use of this information.   Content Version: 6.3.07333; Last Revised: July 1, 2009

## 2018-05-15 NOTE — PROGRESS NOTES
Bryce Carey is a 63 y.o. female   Chief Complaint   Patient presents with    Coagulation disorder    Eye Pain     Pt states left eye redness x1wk. Pt states eye is always itchy.  Leg Pain    Abdominal Pain     Pt states pain getting worse past 3 wks. Pt denies any urinary symptoms. pt here for recheck of her INR and currently at 3.1 so a little high pt will skip tomorrow and Thursday and restart usual dosing after that and recheck in 1 month. Pt also reports a lot of bloating and stomach upset and pt is on a ppi. Discussed testing for H pylori but pt would need to stop ppi for at least 2 weeks. she is a 63y.o. year old female who presents for evalution. Reviewed PmHx, RxHx, FmHx, SocHx, AllgHx and updated and dated in the chart. Review of Systems - negative except as listed above in the HPI    Objective:     Vitals:    05/15/18 1450   BP: 115/57   Pulse: 64   Resp: 16   Temp: 98.4 °F (36.9 °C)   TempSrc: Oral   SpO2: 91%   Weight: 335 lb 6.4 oz (152.1 kg)   Height: 5' 10\" (1.778 m)       Current Outpatient Prescriptions   Medication Sig    allopurinol (ZYLOPRIM) 100 mg tablet TAKE 1 TABLET BY MOUTH EVERY DAY    warfarin (COUMADIN) 2 mg tablet Take 1 Tab by mouth Every Friday. And saturday    warfarin (COUMADIN) 3 mg tablet Take 1 Tab by mouth five (5) days a week. Sun-Thurs    bumetanide (BUMEX) 2 mg tablet Take 1 Tab by mouth two (2) times a day.  oxyCODONE-acetaminophen (PERCOCET 7.5) 7.5-325 mg per tablet Take 1 Tab by mouth every eight (8) hours as needed for Pain. Max Daily Amount: 3 Tabs.  amLODIPine (NORVASC) 5 mg tablet Take 2 Tabs by mouth daily.  ferrous gluconate 324 mg (38 mg iron) tablet Take 1 Tab by mouth daily (with breakfast).  polyethylene glycol (MIRALAX) 17 gram packet Take 1 Packet by mouth daily as needed. For constipation if no BM in 48 hours    senna-docusate (PERICOLACE) 8.6-50 mg per tablet Take 1 Tab by mouth daily.     allopurinol (ZYLOPRIM) 100 mg tablet Take 100 mg by mouth daily.  cetirizine (ZYRTEC) 10 mg tablet Take 10 mg by mouth daily as needed for Allergies.  metOLazone (ZAROXOLYN) 5 mg tablet Take 5 mg by mouth daily as needed.  albuterol (PROAIR HFA) 90 mcg/actuation inhaler Take 2 Puffs by inhalation every four (4) hours as needed for Wheezing.  carvedilol (COREG) 25 mg tablet TAKE 1 TABLET BY MOUTH TWICE A DAY    isosorbide mononitrate ER (IMDUR) 120 mg CR tablet TAKE 1 TABLET BY MOUTH EVERY DAY    pantoprazole (PROTONIX) 40 mg tablet TAKE 1 TABLET BY MOUTH EVERY DAY FOR HEARTBURN    hydrOXYzine pamoate (VISTARIL) 25 mg capsule Take 1 Cap by mouth three (3) times daily as needed for Anxiety.  albuterol (PROVENTIL VENTOLIN) 2.5 mg /3 mL (0.083 %) nebulizer solution 2.5 mg by Nebulization route every four (4) hours as needed.  insulin aspart protamine/insulin aspart (NOVOLOG MIX 70-30) 100 unit/mL (70-30) injection by SubCUTAneous route three (3) times daily as needed. Uses sliding scale     ondansetron (ZOFRAN ODT) 4 mg disintegrating tablet Take 1 Tab by mouth every six (6) hours as needed for Nausea.  fluticasone (FLONASE) 50 mcg/actuation nasal spray 2 Sprays by Both Nostrils route nightly as needed.  calcitRIOL (ROCALTROL) 0.25 mcg capsule Take 0.25 mcg by mouth four (4) days a week. Mon, Wed, Fr, Sat    nitroglycerin (NITROSTAT) 0.3 mg SL tablet 1 Tab by SubLINGual route every five (5) minutes as needed for Chest Pain.  ergocalciferol (VITAMIN D2) 50,000 unit capsule Take 50,000 Units by mouth every Tuesday and Thursday.  aspirin 81 mg tablet Take 81 mg by mouth daily.  atorvastatin (LIPITOR) 80 mg tablet Take 80 mg by mouth every evening. No current facility-administered medications for this visit.         Physical Examination: General appearance - alert, well appearing, and in no distress  Chest - clear to auscultation, no wheezes, rales or rhonchi, symmetric air entry  Heart - normal rate, regular rhythm, normal S1, S2, no murmurs, rubs, clicks or gallops      Assessment/ Plan:   Diagnoses and all orders for this visit:    1. Warfarin anticoagulation  -     AMB POC PT/INR    2. Abdominal bloating     skip Wednesday Thursday restart coumadin normal schedule    Stop ppi x 2 weeks and f/u in office to do possible urea breath test  Follow-up Disposition:  Return in about 2 weeks (around 5/29/2018), or if symptoms worsen or fail to improve. I have discussed the diagnosis with the patient and the intended plan as seen in the above orders. The patient has received an after-visit summary and questions were answered concerning future plans. Pt conveyed understanding of plan.     Medication Side Effects and Warnings were discussed with patient      Jermain Medina,

## 2018-05-23 ENCOUNTER — TELEPHONE (OUTPATIENT)
Dept: CARDIOLOGY CLINIC | Age: 63
End: 2018-05-23

## 2018-05-25 NOTE — TELEPHONE ENCOUNTER
Spoke with Ms. Bunny Cantu. Discussed that we are able now to repeat her [address] with Dr. Corinthia Goodell in order to re-calibrate her CardioMEMS device. She is agreeable to move forward with rescheduling at this time.

## 2018-05-30 ENCOUNTER — APPOINTMENT (OUTPATIENT)
Dept: GENERAL RADIOLOGY | Age: 63
DRG: 193 | End: 2018-05-30
Attending: EMERGENCY MEDICINE
Payer: MEDICARE

## 2018-05-30 ENCOUNTER — HOSPITAL ENCOUNTER (INPATIENT)
Age: 63
LOS: 14 days | Discharge: HOME | DRG: 193 | End: 2018-06-15
Attending: EMERGENCY MEDICINE | Admitting: INTERNAL MEDICINE
Payer: MEDICARE

## 2018-05-30 DIAGNOSIS — Z71.89 DNR (DO NOT RESUSCITATE) DISCUSSION: ICD-10-CM

## 2018-05-30 DIAGNOSIS — J44.9 CHRONIC OBSTRUCTIVE PULMONARY DISEASE, UNSPECIFIED COPD TYPE (HCC): ICD-10-CM

## 2018-05-30 DIAGNOSIS — J44.9 COPD, SEVERE (HCC): ICD-10-CM

## 2018-05-30 DIAGNOSIS — N18.6 ESRD ON DIALYSIS (HCC): ICD-10-CM

## 2018-05-30 DIAGNOSIS — Z99.2 ESRD ON DIALYSIS (HCC): ICD-10-CM

## 2018-05-30 DIAGNOSIS — R53.81 PHYSICAL DEBILITY: ICD-10-CM

## 2018-05-30 DIAGNOSIS — R06.02 SHORTNESS OF BREATH: ICD-10-CM

## 2018-05-30 DIAGNOSIS — N18.4 TYPE 2 DIABETES MELLITUS WITH STAGE 4 CHRONIC KIDNEY DISEASE, WITH LONG-TERM CURRENT USE OF INSULIN (HCC): Chronic | ICD-10-CM

## 2018-05-30 DIAGNOSIS — R07.9 ACUTE CHEST PAIN: Primary | ICD-10-CM

## 2018-05-30 DIAGNOSIS — D63.1 ANEMIA OF CHRONIC RENAL FAILURE, STAGE 4 (SEVERE) (HCC): ICD-10-CM

## 2018-05-30 DIAGNOSIS — I25.118 ATHEROSCLEROSIS OF NATIVE CORONARY ARTERY OF NATIVE HEART WITH STABLE ANGINA PECTORIS (HCC): Chronic | ICD-10-CM

## 2018-05-30 DIAGNOSIS — R11.0 NAUSEA: ICD-10-CM

## 2018-05-30 DIAGNOSIS — I50.32 CHRONIC DIASTOLIC HEART FAILURE (HCC): Chronic | ICD-10-CM

## 2018-05-30 DIAGNOSIS — E11.22 TYPE 2 DIABETES MELLITUS WITH STAGE 4 CHRONIC KIDNEY DISEASE, WITH LONG-TERM CURRENT USE OF INSULIN (HCC): Chronic | ICD-10-CM

## 2018-05-30 DIAGNOSIS — N18.4 CKD (CHRONIC KIDNEY DISEASE) STAGE 4, GFR 15-29 ML/MIN (HCC): Chronic | ICD-10-CM

## 2018-05-30 DIAGNOSIS — N18.4 ANEMIA OF CHRONIC RENAL FAILURE, STAGE 4 (SEVERE) (HCC): ICD-10-CM

## 2018-05-30 DIAGNOSIS — Z79.4 TYPE 2 DIABETES MELLITUS WITH STAGE 4 CHRONIC KIDNEY DISEASE, WITH LONG-TERM CURRENT USE OF INSULIN (HCC): Chronic | ICD-10-CM

## 2018-05-30 DIAGNOSIS — Z71.89 ACP (ADVANCE CARE PLANNING): ICD-10-CM

## 2018-05-30 DIAGNOSIS — R06.09 EXERTIONAL DYSPNEA: ICD-10-CM

## 2018-05-30 DIAGNOSIS — Z71.89 GOALS OF CARE, COUNSELING/DISCUSSION: ICD-10-CM

## 2018-05-30 DIAGNOSIS — D64.9 NORMOCYTIC ANEMIA: Chronic | ICD-10-CM

## 2018-05-30 DIAGNOSIS — Z86.718 HX OF DEEP VENOUS THROMBOSIS: ICD-10-CM

## 2018-05-30 DIAGNOSIS — J84.9 ILD (INTERSTITIAL LUNG DISEASE) (HCC): ICD-10-CM

## 2018-05-30 PROBLEM — I82.409 DVT (DEEP VENOUS THROMBOSIS) (HCC): Status: RESOLVED | Noted: 2017-02-02 | Resolved: 2018-05-30

## 2018-05-30 PROBLEM — Z95.9 S/P LEFT PULMONARY ARTERY PRESSURE SENSOR IMPLANT PLACEMENT: Status: RESOLVED | Noted: 2017-08-31 | Resolved: 2018-05-30

## 2018-05-30 PROBLEM — T50.8X5A ALLERGIC REACTION TO CONTRAST DYE: Status: RESOLVED | Noted: 2017-11-20 | Resolved: 2018-05-30

## 2018-05-30 LAB
ANION GAP SERPL CALC-SCNC: 13 MMOL/L (ref 5–15)
BASOPHILS # BLD: 0 K/UL (ref 0–0.1)
BASOPHILS NFR BLD: 0 % (ref 0–1)
BUN SERPL-MCNC: 51 MG/DL (ref 6–20)
BUN/CREAT SERPL: 14 (ref 12–20)
CALCIUM SERPL-MCNC: 8.7 MG/DL (ref 8.5–10.1)
CHLORIDE SERPL-SCNC: 108 MMOL/L (ref 97–108)
CO2 SERPL-SCNC: 23 MMOL/L (ref 21–32)
CREAT SERPL-MCNC: 3.58 MG/DL (ref 0.55–1.02)
DIFFERENTIAL METHOD BLD: ABNORMAL
EOSINOPHIL # BLD: 0.5 K/UL (ref 0–0.4)
EOSINOPHIL NFR BLD: 6 % (ref 0–7)
ERYTHROCYTE [DISTWIDTH] IN BLOOD BY AUTOMATED COUNT: 14.6 % (ref 11.5–14.5)
EST. AVERAGE GLUCOSE BLD GHB EST-MCNC: 103 MG/DL
GLUCOSE BLD STRIP.AUTO-MCNC: 152 MG/DL (ref 65–100)
GLUCOSE SERPL-MCNC: 196 MG/DL (ref 65–100)
HBA1C MFR BLD: 5.2 % (ref 4.2–6.3)
HCT VFR BLD AUTO: 25.3 % (ref 35–47)
HGB BLD-MCNC: 7.8 G/DL (ref 11.5–16)
IMM GRANULOCYTES # BLD: 0 K/UL (ref 0–0.04)
IMM GRANULOCYTES NFR BLD AUTO: 0 % (ref 0–0.5)
INR PPP: 3.3 (ref 0.9–1.1)
LYMPHOCYTES # BLD: 2 K/UL (ref 0.8–3.5)
LYMPHOCYTES NFR BLD: 24 % (ref 12–49)
MCH RBC QN AUTO: 30 PG (ref 26–34)
MCHC RBC AUTO-ENTMCNC: 30.8 G/DL (ref 30–36.5)
MCV RBC AUTO: 97.3 FL (ref 80–99)
MONOCYTES # BLD: 0.5 K/UL (ref 0–1)
MONOCYTES NFR BLD: 6 % (ref 5–13)
NEUTS SEG # BLD: 5.5 K/UL (ref 1.8–8)
NEUTS SEG NFR BLD: 64 % (ref 32–75)
NRBC # BLD: 0 K/UL (ref 0–0.01)
NRBC BLD-RTO: 0 PER 100 WBC
PLATELET # BLD AUTO: 172 K/UL (ref 150–400)
PMV BLD AUTO: 10.4 FL (ref 8.9–12.9)
POTASSIUM SERPL-SCNC: 3.8 MMOL/L (ref 3.5–5.1)
PROTHROMBIN TIME: 34.1 SEC (ref 9–11.1)
RBC # BLD AUTO: 2.6 M/UL (ref 3.8–5.2)
SERVICE CMNT-IMP: ABNORMAL
SODIUM SERPL-SCNC: 144 MMOL/L (ref 136–145)
TROPONIN I SERPL-MCNC: <0.04 NG/ML
WBC # BLD AUTO: 8.6 K/UL (ref 3.6–11)

## 2018-05-30 PROCEDURE — 86900 BLOOD TYPING SEROLOGIC ABO: CPT | Performed by: EMERGENCY MEDICINE

## 2018-05-30 PROCEDURE — 82962 GLUCOSE BLOOD TEST: CPT

## 2018-05-30 PROCEDURE — 85025 COMPLETE CBC W/AUTO DIFF WBC: CPT | Performed by: EMERGENCY MEDICINE

## 2018-05-30 PROCEDURE — 99218 HC RM OBSERVATION: CPT

## 2018-05-30 PROCEDURE — 84484 ASSAY OF TROPONIN QUANT: CPT | Performed by: EMERGENCY MEDICINE

## 2018-05-30 PROCEDURE — 99285 EMERGENCY DEPT VISIT HI MDM: CPT

## 2018-05-30 PROCEDURE — 93005 ELECTROCARDIOGRAM TRACING: CPT

## 2018-05-30 PROCEDURE — 71046 X-RAY EXAM CHEST 2 VIEWS: CPT

## 2018-05-30 PROCEDURE — 86923 COMPATIBILITY TEST ELECTRIC: CPT | Performed by: EMERGENCY MEDICINE

## 2018-05-30 PROCEDURE — 80048 BASIC METABOLIC PNL TOTAL CA: CPT | Performed by: EMERGENCY MEDICINE

## 2018-05-30 PROCEDURE — 83036 HEMOGLOBIN GLYCOSYLATED A1C: CPT | Performed by: INTERNAL MEDICINE

## 2018-05-30 PROCEDURE — 85610 PROTHROMBIN TIME: CPT | Performed by: INTERNAL MEDICINE

## 2018-05-30 PROCEDURE — 36415 COLL VENOUS BLD VENIPUNCTURE: CPT | Performed by: EMERGENCY MEDICINE

## 2018-05-30 PROCEDURE — 74011250637 HC RX REV CODE- 250/637: Performed by: INTERNAL MEDICINE

## 2018-05-30 RX ORDER — DIPHENHYDRAMINE HCL 25 MG
25 CAPSULE ORAL
Status: DISCONTINUED | OUTPATIENT
Start: 2018-05-30 | End: 2018-06-15 | Stop reason: HOSPADM

## 2018-05-30 RX ORDER — INSULIN LISPRO 100 [IU]/ML
INJECTION, SOLUTION INTRAVENOUS; SUBCUTANEOUS
Status: DISCONTINUED | OUTPATIENT
Start: 2018-05-30 | End: 2018-06-15 | Stop reason: HOSPADM

## 2018-05-30 RX ORDER — PANTOPRAZOLE SODIUM 40 MG/1
40 TABLET, DELAYED RELEASE ORAL
Status: DISCONTINUED | OUTPATIENT
Start: 2018-05-31 | End: 2018-06-15 | Stop reason: HOSPADM

## 2018-05-30 RX ORDER — ONDANSETRON 2 MG/ML
4 INJECTION INTRAMUSCULAR; INTRAVENOUS
Status: DISCONTINUED | OUTPATIENT
Start: 2018-05-30 | End: 2018-06-07

## 2018-05-30 RX ORDER — ISOSORBIDE MONONITRATE 60 MG/1
120 TABLET, EXTENDED RELEASE ORAL DAILY
Status: DISCONTINUED | OUTPATIENT
Start: 2018-05-31 | End: 2018-06-15 | Stop reason: HOSPADM

## 2018-05-30 RX ORDER — CALCITRIOL 0.25 UG/1
0.25 CAPSULE ORAL
Status: DISCONTINUED | OUTPATIENT
Start: 2018-06-01 | End: 2018-06-15 | Stop reason: HOSPADM

## 2018-05-30 RX ORDER — ATORVASTATIN CALCIUM 20 MG/1
80 TABLET, FILM COATED ORAL
Status: DISCONTINUED | OUTPATIENT
Start: 2018-05-30 | End: 2018-06-15 | Stop reason: HOSPADM

## 2018-05-30 RX ORDER — ALLOPURINOL 100 MG/1
100 TABLET ORAL DAILY
Status: DISCONTINUED | OUTPATIENT
Start: 2018-05-31 | End: 2018-06-15 | Stop reason: HOSPADM

## 2018-05-30 RX ORDER — OXYCODONE AND ACETAMINOPHEN 7.5; 325 MG/1; MG/1
1 TABLET ORAL
COMMUNITY
End: 2018-06-21

## 2018-05-30 RX ORDER — AMLODIPINE BESYLATE 10 MG/1
10 TABLET ORAL DAILY
COMMUNITY
End: 2019-01-08 | Stop reason: SDUPTHER

## 2018-05-30 RX ORDER — ASPIRIN 81 MG/1
81 TABLET ORAL DAILY
Status: DISCONTINUED | OUTPATIENT
Start: 2018-05-31 | End: 2018-06-15 | Stop reason: HOSPADM

## 2018-05-30 RX ORDER — NALOXONE HYDROCHLORIDE 0.4 MG/ML
0.4 INJECTION, SOLUTION INTRAMUSCULAR; INTRAVENOUS; SUBCUTANEOUS AS NEEDED
Status: DISCONTINUED | OUTPATIENT
Start: 2018-05-30 | End: 2018-06-15 | Stop reason: HOSPADM

## 2018-05-30 RX ORDER — HYDROXYZINE 25 MG/1
25 TABLET, FILM COATED ORAL
Status: DISCONTINUED | OUTPATIENT
Start: 2018-05-30 | End: 2018-06-15 | Stop reason: HOSPADM

## 2018-05-30 RX ORDER — SODIUM CHLORIDE 0.9 % (FLUSH) 0.9 %
5-10 SYRINGE (ML) INJECTION AS NEEDED
Status: DISCONTINUED | OUTPATIENT
Start: 2018-05-30 | End: 2018-06-15 | Stop reason: HOSPADM

## 2018-05-30 RX ORDER — FLUTICASONE PROPIONATE 50 MCG
2 SPRAY, SUSPENSION (ML) NASAL
Status: DISCONTINUED | OUTPATIENT
Start: 2018-05-30 | End: 2018-06-15 | Stop reason: HOSPADM

## 2018-05-30 RX ORDER — OXYCODONE AND ACETAMINOPHEN 7.5; 325 MG/1; MG/1
1 TABLET ORAL
Status: DISCONTINUED | OUTPATIENT
Start: 2018-05-30 | End: 2018-05-31

## 2018-05-30 RX ORDER — BUMETANIDE 1 MG/1
2 TABLET ORAL 2 TIMES DAILY
Status: DISCONTINUED | OUTPATIENT
Start: 2018-05-30 | End: 2018-06-01

## 2018-05-30 RX ORDER — MAGNESIUM SULFATE 100 %
4 CRYSTALS MISCELLANEOUS AS NEEDED
Status: DISCONTINUED | OUTPATIENT
Start: 2018-05-30 | End: 2018-06-15 | Stop reason: HOSPADM

## 2018-05-30 RX ORDER — ERGOCALCIFEROL 1.25 MG/1
50000 CAPSULE ORAL
Status: DISCONTINUED | OUTPATIENT
Start: 2018-06-05 | End: 2018-06-15 | Stop reason: HOSPADM

## 2018-05-30 RX ORDER — WARFARIN 2 MG/1
2 TABLET ORAL
COMMUNITY
End: 2018-06-15

## 2018-05-30 RX ORDER — CETIRIZINE HCL 10 MG
10 TABLET ORAL
Status: DISCONTINUED | OUTPATIENT
Start: 2018-05-30 | End: 2018-05-30

## 2018-05-30 RX ORDER — POLYETHYLENE GLYCOL 3350 17 G/17G
17 POWDER, FOR SOLUTION ORAL DAILY
Status: DISCONTINUED | OUTPATIENT
Start: 2018-05-31 | End: 2018-06-15 | Stop reason: HOSPADM

## 2018-05-30 RX ORDER — AMOXICILLIN 250 MG
1 CAPSULE ORAL DAILY
Status: DISCONTINUED | OUTPATIENT
Start: 2018-05-31 | End: 2018-06-15 | Stop reason: HOSPADM

## 2018-05-30 RX ORDER — DEXTROSE 50 % IN WATER (D50W) INTRAVENOUS SYRINGE
12.5-25 AS NEEDED
Status: DISCONTINUED | OUTPATIENT
Start: 2018-05-30 | End: 2018-06-15 | Stop reason: HOSPADM

## 2018-05-30 RX ORDER — SODIUM CHLORIDE 0.9 % (FLUSH) 0.9 %
5-10 SYRINGE (ML) INJECTION EVERY 8 HOURS
Status: DISCONTINUED | OUTPATIENT
Start: 2018-05-30 | End: 2018-06-15 | Stop reason: HOSPADM

## 2018-05-30 RX ORDER — CARVEDILOL 6.25 MG/1
25 TABLET ORAL 2 TIMES DAILY WITH MEALS
Status: DISCONTINUED | OUTPATIENT
Start: 2018-05-30 | End: 2018-06-15 | Stop reason: HOSPADM

## 2018-05-30 RX ORDER — BUMETANIDE 1 MG/1
2 TABLET ORAL 2 TIMES DAILY
Status: ON HOLD | COMMUNITY
End: 2018-06-15

## 2018-05-30 RX ORDER — ALBUTEROL SULFATE 0.83 MG/ML
2.5 SOLUTION RESPIRATORY (INHALATION)
Status: DISCONTINUED | OUTPATIENT
Start: 2018-05-30 | End: 2018-06-15 | Stop reason: HOSPADM

## 2018-05-30 RX ORDER — WARFARIN 2 MG/1
3 TABLET ORAL
COMMUNITY
End: 2018-06-15

## 2018-05-30 RX ORDER — ACETAMINOPHEN 325 MG/1
650 TABLET ORAL
Status: DISCONTINUED | OUTPATIENT
Start: 2018-05-30 | End: 2018-06-15 | Stop reason: HOSPADM

## 2018-05-30 RX ADMIN — BUMETANIDE 2 MG: 1 TABLET ORAL at 21:11

## 2018-05-30 RX ADMIN — CARVEDILOL 25 MG: 12.5 TABLET, FILM COATED ORAL at 20:25

## 2018-05-30 RX ADMIN — OXYCODONE HYDROCHLORIDE AND ACETAMINOPHEN 1 TABLET: 7.5; 325 TABLET ORAL at 20:34

## 2018-05-30 RX ADMIN — Medication 10 ML: at 22:32

## 2018-05-30 RX ADMIN — ATORVASTATIN CALCIUM 80 MG: 20 TABLET, FILM COATED ORAL at 22:32

## 2018-05-30 NOTE — ED PROVIDER NOTES
HPI Comments: 63 y.o. female with past medical history significant for MI, CHF, CKD, HTN, DM, gastroparesis, ILD, ovarian CA, CAD, pneumonia, hysterectomy, cholecystectomy, and appendectomy who presents from home with chief complaint of SOB. The pt reports that she was found to be anemic at [facility] and was not able to receive her scheduled Procrit shot. The pt has been receiving Procrit shots since January. The blood was drawn at 1330. The pt was then advised to visit the ED - told her hgb was 7.8. The pt reports that she has had SOB, CP,rhinorrhea, and chest tightness. The pt has been taking nitroglycerin and her last dose was one night ago - it helped. The pt reports that her SOB and CP are worsened exertionally. The pt is not on dialysis but has a port in place for future dialyzing. The pt reports that she has known blockages but cardiology will not catheterize her due to her current blood work. The pt is on 2L oxygen at home. The pt denies cough, fever, hemoptysis, hematuria, and blood in stool. There are no other acute medical concerns at this time. Social hx: former smoker, no EtOH use  PCP: Ziggy Gunn DO    Note written by Jonathon Mckeon, as dictated by Angela Leach DO 3:46 PM      The history is provided by the patient. No  was used.         Past Medical History:   Diagnosis Date     Sleep Apnea 2/16/2011    Angina, class III ([address] 8/9/2013    Aortic aneurysm ([address]     CAD (coronary Artery Disease) Native Artery 2/16/2011    CKD (chronic kidney disease) stage 4, GFR 15-29 ml/min ([address] 2/10/2017    Diabetic gastroparesis (HCC)     Diastolic heart failure ([address] 10/5/2012    Esophageal stricture 2012    dialted Dr. Yoandy Roper G6PD deficiency ([address]     trait    GERD (gastroesophageal reflux disease)     Hypertensive Cardiovasc Dis Benign, No CHF 2/16/2011    ILD (interstitial lung disease) ([address]     open lung bx CJW 2010    OA (osteoarthritis)     Obesity 2/16/2011    Ovarian cancer [facility]     cervical and uterine    PNA (pneumonia)     Rheumatoid arteritis     S/P left pulmonary artery pressure sensor implant placement 8/31/2017    [employer]     T2DM (type 2 diabetes mellitus) ([facility] 8/9/2013    Tobacco use disorder 3/21/2012    Uterine cervix cancer ([facility]     Vitamin D deficiency 8/9/2013       Past Surgical History:   Procedure Laterality Date    CARDIAC SURG PROCEDURE UNLIST      stents    COLONOSCOPY N/A 6/24/2016    COLONOSCOPY performed by Kenneth Banuelos MD at [address] [facility] HX APPENDECTOMY      HX CARPAL TUNNEL RELEASE      bilateral    HX CHOLECYSTECTOMY      HX HERNIA REPAIR      HX HYSTERECTOMY      HX ORTHOPAEDIC  11/12/12    right knee replacement    HX TONSIL AND ADENOIDECTOMY      UPPER GI ENDOSCOPY,VINOD COLON GUIDE  6/24/2016              Family History:   Problem Relation Age of Onset    Heart Disease Mother     Heart Disease Brother        Social History     Social History    Marital status:      Spouse name: N/A    Number of children: N/A    Years of education: N/A     Occupational History    Not on file. Social History Main Topics    Smoking status: Former Smoker     Packs/day: 0.50     Years: 41.00     Types: Cigarettes     Quit date: 11/9/2014    Smokeless tobacco: Never Used    Alcohol use No    Drug use: No    Sexual activity: Not on file     Other Topics Concern    Not on file     Social History Narrative         ALLERGIES: Contrast dye [iodine]; Levaquin [levofloxacin]; and Morphine    Review of Systems   Constitutional: Negative for fever. HENT: Positive for rhinorrhea. Respiratory: Positive for chest tightness and shortness of breath. Negative for cough. Cardiovascular: Positive for chest pain. Gastrointestinal: Negative for blood in stool. Genitourinary: Negative for hematuria. All other systems reviewed and are negative.       Vitals:    05/30/18 1509 05/30/18 1520   BP: 139/62 Pulse: 70    Resp: 15    Temp: 98 °F (36.7 °C)    SpO2: 97% 98%   Weight: 152 kg (335 lb)             Physical Exam   Nursing note and vitals reviewed. Constitutional: Pt is awake and alert. Pt appears well-developed and well-nourished. NAD. HENT:   Head: Normocephalic and atraumatic. Nose: Nose normal.   Mouth/Throat: Oropharynx is clear and moist. No oropharyngeal exudate. Eyes: Conjunctivae and extraocular motions are normal. Pupils are equal, round, and reactive to light. Right eye exhibits no discharge. Left eye exhibits no discharge. No scleral icterus. Neck: No tracheal deviation present. Supple neck. Cardiovascular: Normal rate, regular rhythm, normal heart sounds and intact distal pulses. Exam reveals no gallop and no friction rub. No murmur heard. Pt  has no rales. Pulmonary/Chest: Effort normal.  Pt  has no wheezes. Slight crackles in the R base  Abdominal: Soft. Pt  exhibits no distension and no mass. No tenderness. Pt  has no rebound and no guarding. Musculoskeletal:  no tenderness. 1+ B/L LE edema  Ext: Normal ROM in all four extremities; not tender to palpation; distal pulses are normal.   Neurological:  Pt is alert. nonfocal neuro exam.  Skin: Skin is warm and dry. Pt  is not diaphoretic. Psychiatric:  Pt  has a normal mood and affect. Behavior is normal.   Note written by Jonathon Ackerman, as dictated by Anabel Linda DO 4:13 PM              [facility]      ED Course       Procedures  ED EKG interpretation:  Rhythm: normal sinus rhythm; and regular . Rate (approx.): 78; Axis: normal; ST/T wave: normal;  Note written by Jonathon Ackerman, as dictated by Anabel Linda DO 3:48 PM    5:30 PM  [facility]    CONSULT NOTE:  5:33 PM Anabel Linda DO spoke with Dr. Akbar Johnston, Consult for Hospitalist.  Discussed available diagnostic tests and clinical findings. He will see and admit the pt.         Recent Results (from the past 12 hour(s))   TYPE & SCREEN    Collection Time: 05/30/18  3:30 PM   Result Value Ref Range    Crossmatch Expiration 06/02/2018     ABO/Rh(D) A POSITIVE     Antibody screen NEG    CBC WITH AUTOMATED DIFF    Collection Time: 05/30/18  3:30 PM   Result Value Ref Range    WBC 8.6 3.6 - 11.0 K/uL    RBC 2.60 (L) 3.80 - 5.20 M/uL    HGB 7.8 (L) 11.5 - 16.0 g/dL    HCT 25.3 (L) 35.0 - 47.0 %    MCV 97.3 80.0 - 99.0 FL    MCH 30.0 26.0 - 34.0 PG    MCHC 30.8 30.0 - 36.5 g/dL    RDW 14.6 (H) 11.5 - 14.5 %    PLATELET 697 396 - 438 K/uL    MPV 10.4 8.9 - 12.9 FL    NRBC 0.0 0  WBC    ABSOLUTE NRBC 0.00 0.00 - 0.01 K/uL    NEUTROPHILS 64 32 - 75 %    LYMPHOCYTES 24 12 - 49 %    MONOCYTES 6 5 - 13 %    EOSINOPHILS 6 0 - 7 %    BASOPHILS 0 0 - 1 %    IMMATURE GRANULOCYTES 0 0.0 - 0.5 %    ABS. NEUTROPHILS 5.5 1.8 - 8.0 K/UL    ABS. LYMPHOCYTES 2.0 0.8 - 3.5 K/UL    ABS. MONOCYTES 0.5 0.0 - 1.0 K/UL    ABS. EOSINOPHILS 0.5 (H) 0.0 - 0.4 K/UL    ABS. BASOPHILS 0.0 0.0 - 0.1 K/UL    ABS. IMM.  GRANS. 0.0 0.00 - 0.04 K/UL    DF AUTOMATED     METABOLIC PANEL, BASIC    Collection Time: 05/30/18  3:30 PM   Result Value Ref Range    Sodium 144 136 - 145 mmol/L    Potassium 3.8 3.5 - 5.1 mmol/L    Chloride 108 97 - 108 mmol/L    CO2 23 21 - 32 mmol/L    Anion gap 13 5 - 15 mmol/L    Glucose 196 (H) 65 - 100 mg/dL    BUN 51 (H) 6 - 20 MG/DL    Creatinine 3.58 (H) 0.55 - 1.02 MG/DL    BUN/Creatinine ratio 14 12 - 20      GFR est AA 16 (L) >60 ml/min/1.73m2    GFR est non-AA 13 (L) >60 ml/min/1.73m2    Calcium 8.7 8.5 - 10.1 MG/DL   TROPONIN I    Collection Time: 05/30/18  3:30 PM   Result Value Ref Range    Troponin-I, Qt. <0.04 <0.05 ng/mL   PROTHROMBIN TIME + INR    Collection Time: 05/30/18  3:30 PM   Result Value Ref Range    INR 3.3 (H) 0.9 - 1.1      Prothrombin time 34.1 (H) 9.0 - 11.1 sec

## 2018-05-30 NOTE — IP AVS SNAPSHOT
[facility] 
 
 
 [address] [facility] [address] [place] [address] 
[phone] Patient: Ashlyn Feliciano MRN: [id number] About your hospitalization You were admitted on:  May 30, 2018 You last received care in the:  [facility] [address] 2 You were discharged on:  Margarita 15, 2018 Why you were hospitalized Your primary diagnosis was:  Not on File Your diagnoses also included:  Coronary Atherosclerosis Of Native Coronary Artery, Reese On Cpap, Pulmonary Htn (Hcc), Htn (Hypertension), Morbid Obesity (Hcc), Chronic Diastolic Heart Failure (Hcc), Normocytic Anemia, Dm (Diabetes Mellitus), Type 2 With Renal Complications (Hcc), Ckd (Chronic Kidney Disease) Stage 4, Gfr 15-29 Ml/Min (Hcc), Esophageal Reflux, Gastroparesis, Chest Pain, Ild (Interstitial Lung Disease) (Hcc), Copd, Severe (Hcc), Warfarin Anticoagulation, Hx Of Deep Venous Thrombosis Follow-up Information Follow up With Details Comments Contact Info Lou Owens DO Go on 6/26/2018 Hospital PCP f/u appointment on Tuesday 6/26 @ 1:15 p[address]  [address] [address] 
[phone] Beena Mae MD On 6/19/2018 3 pm [address] [facility] Yair [phone] [address] [place] [address] 
[phone] [address] [facility] [address] 
[phone] 6[facility]16[facility]2018 Critical access hospital will call the patient to schedule appointment. [phone] [address]yan [address] 
[address] [address] 
phone is [phone] 6:15am 
First Day is Tuesday 6/19 Your Scheduled Appointments Monday June 18, 2018 11:30 AM EDT  
IR INS TUNL CVC WO PORT>5Y with [facility]GIO 1  
[facility] IR ([address]) [address] [facility] [address] [place] [address]  
[phone]  DIET RESTRICTIONS NPO, do not eat or drink 8 hours prior to test. Take all medications not requiring food with sip of water, excluding blood thinners and diabetic medications. GENERAL INSTRUCTIONS 1. Bring any non [employer] facility films/images pertaining to the area of interest with you on the day of appointment. 2. Bring a list of all medications you are currently taking, including over the counter medications. 3. A valid order with Physicians signature is required for all scheduled tests. 4. Blood thinners and platelet inhibitors must be stopped 3-5 days prior to procedure. Consult your ordering physician prior to stopping them. 5. Time given is ARRIVAL time, not procedure time. 6. You must have a  present before a procedure is started. 7. The procedure may last 1-1 ½ hours. You may be required to stay 4-6 hours after the procedure for observation. If you have any questions please direct them to your ordering physician. Park in designated visitor/patient parking. Enter through the main entrance, which is just to the left of the fountain. Once inside, go around the corner to the left. You will register in Outpatient Registration. Tuesday June 19, 2018  2:20 PM EDT  
ESTABLISHED PATIENT with Mendoza Zelaya MD  
[facility] ([facility]) [address]  
[phone] Tuesday June 26, 2018  1:15 PM EDT TRANSITIONAL CARE MANAGEMENT [place] [address] [place] [address] ([facility]) [id number] [place], [place] [id number] [phone] Discharge Orders None A check marcelo indicates which time of day the medication should be taken. My Medications START taking these medications Instructions Each Dose to Equal  
 Morning Noon Evening Bedtime  
 cloNIDine HCl 0.2 mg tablet Commonly known as:  CATAPRES Your last dose was:  6/12/18 @ 6:00 PM  
Your next dose is:  6/15/18 @ PM  
   
 Take 1 Tab by mouth two (2) times a day. 0.2 mg  
    
  
   
   
  
   
  
 hydrALAZINE 50 mg tablet Commonly known as:  APRESOLINE Your last dose was:  6/14/18 @ 9:30 PM  
Your next dose is:  6/15/18 @ PM  
   
 Take 1 Tab by mouth three (3) times daily. 50 mg  
    
  
   
   
  
   
  
  
 metOLazone 5 mg tablet Commonly known as:  Sam Bude Your last dose was:  6/15/18 @ 1:45 PM  
Your next dose is:  6/16/18 @ AM  
   
 Take 1 Tab by mouth daily. 5 mg CHANGE how you take these medications Instructions Each Dose to Equal  
 Morning Noon Evening Bedtime  
 allopurinol 100 mg tablet Commonly known as:  Danielle Mims What changed:  Another medication with the same name was removed. Continue taking this medication, and follow the directions you see here. Your last dose was:  6/15/18 @ 1:45 PM  
Your next dose is:  6/16/18 @ AM  
   
 Take 100 mg by mouth daily. 100 mg  
    
  
   
   
   
  
 amLODIPine 10 mg tablet Commonly known as:  Yan Garcia What changed:  Another medication with the same name was removed. Continue taking this medication, and follow the directions you see here. Your last dose was:  6/15/18 @ 1:45 AM  
Your next dose is:  6/16/18 @ AM  
   
 Take 10 mg by mouth daily. 10 mg  
    
  
   
   
   
  
 bumetanide 1 mg tablet Commonly known as:  Andrew Zamarripa What changed:  Another medication with the same name was removed. Continue taking this medication, and follow the directions you see here. Your last dose was:  6/15/18 @ 1:45 PM  
Your next dose is:  6/15/18 @ PM  
   
 Take 2 Tabs by mouth two (2) times a day. 2 mg PERCOCET 7.5-325 mg per tablet Generic drug:  oxyCODONE-acetaminophen What changed:  Another medication with the same name was removed. Continue taking this medication, and follow the directions you see here.   
Notes to Patient:  AS NEEDED  
   
 Take 1 Tab by mouth every four (4) hours as needed for Pain. 1 Tab CONTINUE taking these medications Instructions Each Dose to Equal  
 Morning Noon Evening Bedtime * PROAIR HFA 90 mcg/actuation inhaler Generic drug:  albuterol Notes to Patient:  AS NEEDED Take 2 Puffs by inhalation every four (4) hours as needed for Wheezing. 2 Puff * albuterol 2.5 mg /3 mL (0.083 %) nebulizer solution Commonly known as:  PROVENTIL VENTOLIN Notes to Patient:  AS NEEDED  
   
 2.5 mg by Nebulization route every four (4) hours as needed. 2.5 mg  
    
   
   
   
  
 aspirin 81 mg tablet Your last dose was:  6/15/18 @ 1:45 PM  
Your next dose is:  6/16/18 @ AM  
   
 Take 81 mg by mouth daily. 81 mg  
    
  
   
   
   
  
 atorvastatin 80 mg tablet Commonly known as:  LIPITOR Your last dose was:  6/14/18 @ 9:30 PM  
Your next dose is:  6/15/18 @ BEDTIME Take 80 mg by mouth nightly. 80 mg  
    
   
   
   
  
  
 calcitRIOL 0.25 mcg capsule Commonly known as:  ROCALTROL Your last dose was:  6/15/18 @ 1:45 Your next dose is:  6/16/18 @ AM  
   
 Take 0.25 mcg by mouth every Monday, Wednesday, Friday, Saturday. 0.25 mcg  
    
  
   
   
   
  
 carvedilol 25 mg tablet Commonly known as:  Tatiana Salts Your last dose was:  6/15/18 @ 1:45 PM  
Your next dose is:  6/15/18 @ PM  
   
 TAKE 1 TABLET BY MOUTH TWICE A DAY FLONASE 50 mcg/actuation nasal spray Generic drug:  fluticasone Notes to Patient:  AS NEEDED 2 Sprays by Both Nostrils route nightly as needed. 2 Spray  
    
   
   
   
  
 isosorbide mononitrate  mg CR tablet Commonly known as:  IMDUR Your last dose was:  6/15/18 @ 1:45 PM  
Your next dose is:  6/16/18 @ AM  
   
 TAKE 1 TABLET BY MOUTH EVERY DAY  
     
  
   
   
   
  
 nitroglycerin 0.3 mg SL tablet Commonly known as:  NITROSTAT 1 Tab by SubLINGual route every five (5) minutes as needed for Chest Pain. 0.4 mg NovoLOG Mix 70-30 U-100 Insuln 100 unit/mL (70-30) injection Generic drug:  insulin aspart protamine/insulin aspart Notes to Patient:  AS NEEDED  
   
 by SubCUTAneous route three (3) times daily as needed (for BG  > 140 mg/dL). Uses sliding scale  
     
   
   
   
  
 pantoprazole 40 mg tablet Commonly known as:  PROTONIX Your last dose was:  6/14/18 @ 6:45 AM  
Your next dose is:  6/16/18 @ AM  
   
 TAKE 1 TABLET BY MOUTH EVERY DAY FOR HEARTBURN  
     
  
   
   
   
  
 VITAMIN D2 50,000 unit capsule Generic drug:  ergocalciferol Your last dose was:  6/5/18 @ 9:00 AM  
Your next dose is:  6/19/18 @ AM  
   
 Take 50,000 Units by mouth every Tuesday. [address] Units * Notice: This list has 2 medication(s) that are the same as other medications prescribed for you. Read the directions carefully, and ask your doctor or other care provider to review them with you. STOP taking these medications   
 warfarin 2 mg tablet Commonly known as:  COUMADIN  
   
  
 warfarin 3 mg tablet Commonly known as:  COUMADIN Where to Get Your Medications These medications were sent to [address] [place] [address], [address] [address] Hours:  24-hours Phone:  [phone]  
  bumetanide 1 mg tablet Information on where to get these meds will be given to you by the nurse or doctor. ! Ask your nurse or doctor about these medications  
  cloNIDine HCl 0.2 mg tablet  
 hydrALAZINE 50 mg tablet  
 metOLazone 5 mg tablet Opioid Education  Prescription Opioids: What You Need to Know: 
 
Prescription opioids can be used to help relieve moderate-to-severe pain and are often prescribed following a surgery or injury, or for certain health conditions. These medications can be an important part of treatment but also come with serious risks. Opioids are strong pain medicines. Examples include hydrocodone, oxycodone, fentanyl, and morphine. Heroin is an example of an illegal opioid. It is important to work with your health care provider to make sure you are getting the safest, most effective care. WHAT ARE THE RISKS AND SIDE EFFECTS OF OPIOID USE? Prescription opioids carry serious risks of addiction and overdose, especially with prolonged use. An opioid overdose, often marked by slow breathing, can cause sudden death. The use of prescription opioids can have a number of side effects as well, even when taken as directed. · Tolerance-meaning you might need to take more of a medication for the same pain relief · Physical dependence-meaning you have symptoms of withdrawal when the medication is stopped. Withdrawal symptoms can include nausea, sweating, chills, diarrhea, stomach cramps, and muscle aches. Withdrawal can last up to several weeks, depending on which drug you took and how long you took it. · Increased sensitivity to pain · Constipation · Nausea, vomiting, and dry mouth · Sleepiness and dizziness · Confusion · Depression · Low levels of testosterone that can result in lower sex drive, energy, and strength · Itching and sweating RISKS ARE GREATER WITH:      
· History of drug misuse, substance use disorder, or overdose · Mental health conditions (such as depression or anxiety) · Sleep apnea · Older age (72 years or older) · Pregnancy Avoid alcohol while taking prescription opioids. Also, unless specifically advised by your health care provider, medications to avoid include: · Benzodiazepines (such as Xanax or Valium) · Muscle relaxants (such as Soma or Flexeril) · Hypnotics (such as Ambien or Lunesta) · Other prescription opioids KNOW YOUR OPTIONS Talk to your health care provider about ways to manage your pain that don't involve prescription opioids. Some of these options may actually work better and have fewer risks and side effects. Options may include: 
· Pain relievers such as acetaminophen, ibuprofen, and naproxen · Some medications that are also used for depression or seizures · Physical therapy and exercise · Counseling to help patients learn how to cope better with triggers of pain and stress. · Application of heat or cold compress · Massage therapy · Relaxation techniques Be Informed Make sure you know the name of your medication, how much and how often to take it, and its potential risks & side effects. IF YOU ARE PRESCRIBED OPIOIDS FOR PAIN: 
· Never take opioids in greater amounts or more often than prescribed. Remember the goal is not to be pain-free but to manage your pain at a tolerable level. · Follow up with your primary care provider to: · Work together to create a plan on how to manage your pain. · Talk about ways to help manage your pain that don't involve prescription opioids. · Talk about any and all concerns and side effects. · Help prevent misuse and abuse. · Never sell or share prescription opioids · Help prevent misuse and abuse. · Store prescription opioids in a secure place and out of reach of others (this may include visitors, children, friends, and family). · Safely dispose of unused/unwanted prescription opioids: Find your community drug take-back program or your pharmacy mail-back program, or flush them down the toilet, following guidance from the Food and Drug Administration (www.fda.gov/Drugs/ResourcesForYou). · Visit www.cdc.gov/drugoverdose to learn about the risks of opioid abuse and overdose. · If you believe you may be struggling with addiction, tell your health care provider and ask for guidance or call [facility] at 1-482-049-XWUL. Discharge Instructions Patient Discharge Instructions Marcial Loges / [id number] : 1955 Admitted 2018 Discharged: 2018 Take Home Medications · It is important that you take the medication exactly as they are prescribed. · Keep your medication in the bottles provided by the pharmacist and keep a list of the medication names, dosages, and times to be taken in your wallet. · Do not take other medications without consulting your doctor. BRING ALL OF YOUR MEDICINES TO YOUR OFFICE VISIT with Dr Manjit Pizarro Future Appointments Date Time Provider Meagan Mcintyre 2018 3:00 PM DO ANTHONY LamENA AVTAR  
2018 2:20 PM Teri Phillips MD [address] [place] [address] Cardiac Catheterization  Discharge Instructions ? Do not drive, operate any machinery, or sign any legal documents for 24 hours after your procedure. You must have someone to drive you home. ? You may take a shower 24 hours after your cardiac catheterization. Be sure to get the dressing wet and then remove it; gently wash the area with warm soapy water. Pat dry and leave open to air. To help prevent infections, be sure to keep the cath site clean and dry. No lotions, creams, powders, ointments, etc. in the cath site for approximately 1 week. ? Do not take a tub bath, get in a hot tub or swimming pool for approximately 5 days or until the cath site is completely healed. ? No strenuous activity or heavy lifting over 10 lbs. for 7 days. ? Drink plenty of fluids for 24-48 hours after your cath to flush the contrast dye from your kidneys. No alcoholic beverages for 24 hours. You may resume your previous diet (low fat, low cholesterol) after your cath. ? After your cath, some bruising or discomfort is common during the healing process.   Tylenol, 1-2 tablets every 6 hours as needed, is recommended if you experience any discomfort. If you experience any signs or symptoms of infection such as fever, chills, or poorly healing incision, persistent tenderness or swelling in the groin, redness and/or warmth to the touch, numbness, significant tingling or pain at the groin site or affected extremity, rash, drainage from the cath site, or if the leg feels tight or swollen, call your physician right away. ? If bleeding at the cath site occurs, take a clean gauze pad and apply direct pressure to the groin just above the puncture site. Call 911 immediately, and continue to apply direct pressure until an ambulance gets to your location. ? You may return to work  2  days after your cardiac cath if no groin bleeding. Information obtained by : 
I understand that if any problems occur once I am at home I am to contact my physician. I understand and acknowledge receipt of the instructions indicated above. R.N.'s Signature                                                                  Date/Time Patient or Representative Signature                                                          Date/Time Roxane Collins NP Radial Cardiac Catheterization/Angiography Discharge Instructions It is normal to feel tired the first couple days. Take it easy and follow the physicians instructions. CHECK THE CATHETER INSERTION SITE DAILY:  
Remove the wrist dressing 24 hours after the procedure. You may shower 24 hours after the procedure. Wash with soap and water and pat dry. Gentle cleaning of the site with soap and water is sufficient, cover with a dry clean dressing or bandage. Do not apply creams or powders to the area. No soaking the wrist for 3 days. Leave the puncture site open to air after 24 hours post-procedure. CALL THE PHYSICIANS:  
If the site becomes red, swollen or feels warm to the touch If there is bleeding or drainage or if there is unusual pain at the radial site. If there is any minor oozing, you may apply a band-aid and remove after 12 hours. If the bleeding continues, hold pressure with the middle finger against the puncture site and the thumb against the back of the wrist,call 911 to be transported to the hospital.  
DO NOT DRIVE YOURSELF, Aayush 393. ACTIVITY:  
For the first 24 hours do not manipulate the wrist.  
No lifting, pushing or pulling over 3-5 pounds with the affected wrist for 7 daysand no straining the insertion site. Do not life grocery bags or the garbage can, do not run the vacuum  or  for 7 days. Start with short walks as in the hospital and gradually increase as tolerated each day. It is recommended to walk 30 minutes 5-7 days per week. Follow your physicians instructions on activity. Avoid walking outside in extremes of heat or cold. Walk inside when it is cold and windy or hot and humid. Things to keep in mind:  
No driving for at least 24 hours, or as designated by your physician. Limit the number of times you go up and down the stairs Take rests and pace yourself with activity. Be careful and do not strain with bowel movements. MEDICATIONS:  
Take all medications as prescribed Call your physician if you have any questions Keep an updated list of your medications with you at all times and give a list to your physician and pharmacist  
SIGNS AND SYMPTOMS:  
Be cautious of symptoms of angina or recurrent symptoms such as chest discomfort, unusual shortness of breath or fatigue. These could be symptoms of restenosis, a new blockage or a heart attack. If your symptoms are relieved with rest it is still recommended that you notify your physician of recurrent chest pain or discomfort. For CHEST PAIN or symptoms of angina not relieved with rest: If the discomfort is not relieved with rest, and you have been prescribed Nitroglycerin, take as directed (taken under the tongue, one at a time 5 minutes apart for a total of 3 doses). If the discomfort is not relieved after the 3rd nitroglycerin, call 911. If you have not been prescribed Nitroglycerin and your chest discomfort is not relieved with rest, call 911. AFTER CARE:  
Follow up with your physician as instructed. Follow a heart healthy diet with proper portion control, daily stress management, daily exercise, blood pressure and cholesterol control , and smoking cessation. Avoiding Triggers With Heart Failure: Care Instructions Your Care Instructions Triggers are anything that make your heart failure flare up. A flare-up is also called \"sudden heart failure\" or \"acute heart failure. \" When you have a flare-up, fluid builds up in your lungs, and you have problems breathing. You might need to go to the hospital. By watching for changes in your condition and avoiding triggers, you can prevent heart failure flare-ups. Follow-up care is a key part of your treatment and safety. Be sure to make and go to all appointments, and call your doctor if you are having problems. It's also a good idea to know your test results and keep a list of the medicines you take. How can you care for yourself at home? Watch for changes in your weight and condition · Weigh yourself without clothing at the same time each day. Record your weight. Call your doctor if you have sudden weight gain, such as more than 2 to 3 pounds in a day or 5 pounds in a week. (Your doctor may suggest a different range of weight gain.) A sudden weight gain may mean that your heart failure is getting worse. · Keep a daily record of your symptoms. Write down any changes in how you feel, such as new shortness of breath, cough, or problems eating. Also record if your ankles are more swollen than usual and if you feel more tired than usual. Note anything that you ate or did that could have triggered these changes. Limit sodium Sodium causes your body to hold on to extra water. This may cause your heart failure symptoms to get worse. People get most of their sodium from processed foods. Fast food and restaurant meals also tend to be very high in sodium. · Your doctor may suggest that you limit sodium to 2,000 milligrams (mg) a day or less. That is less than 1 teaspoon of salt a day, including all the salt you eat in cooking or in packaged foods. · Read food labels on cans and food packages. They tell you how much sodium you get in one serving. Check the serving size. If you eat more than one serving, you are getting more sodium. · Be aware that sodium can come in forms other than salt, including monosodium glutamate (MSG), sodium citrate, and sodium bicarbonate (baking soda). MSG is often added to Asian food. You can sometimes ask for food without MSG or salt. · Slowly reducing salt will help you adjust to the taste. Take the salt shaker off the table. · Flavor your food with garlic, lemon juice, onion, vinegar, herbs, and spices instead of salt. Do not use soy sauce, steak sauce, onion salt, garlic salt, mustard, or ketchup on your food, unless it is labeled \"low-sodium\" or \"low-salt. \" 
· Make your own salad dressings, sauces, and ketchup without adding salt. · Use fresh or frozen ingredients, instead of canned ones, whenever you can. Choose low-sodium canned goods. · Eat less processed food and food from restaurants, including fast food. Exercise as directed Moderate, regular exercise is very good for your heart. It improves your blood flow and helps control your weight.  But too much exercise can stress your heart and cause a heart failure flare-up. · Check with your doctor before you start an exercise program. 
· Walking is an easy way to get exercise. Start out slowly. Gradually increase the length and pace of your walk. Swimming, riding a bike, and using a treadmill are also good forms of exercise. · When you exercise, watch for signs that your heart is working too hard. You are pushing yourself too hard if you cannot talk while you are exercising. If you become short of breath or dizzy or have chest pain, stop, sit down, and rest. 
· Do not exercise when you do not feel well. Take medicines correctly · Take your medicines exactly as prescribed. Call your doctor if you think you are having a problem with your medicine. · Make a list of all the medicines you take. Include those prescribed to you by other doctors and any over-the-counter medicines, vitamins, or supplements you take. Take this list with you when you go to any doctor. · Take your medicines at the same time every day. It may help you to post a list of all the medicines you take every day and what time of day you take them. · Make taking your medicine as simple as you can. Plan times to take your medicines when you are doing other things, such as eating a meal or getting ready for bed. This will make it easier to remember to take your medicines. · Get organized. Use helpful tools, such as daily or weekly pill containers. When should you call for help? Call 911 if you have symptoms of sudden heart failure such as: 
? · You have severe trouble breathing. ? · You cough up pink, foamy mucus. ? · You have a new irregular or rapid heartbeat. ?Call your doctor now or seek immediate medical care if: 
? · You have new or increased shortness of breath. ? · You are dizzy or lightheaded, or you feel like you may faint.   
? · You have sudden weight gain, such as more than 2 to 3 pounds in a day or 5 pounds in a week. (Your doctor may suggest a different range of weight gain.) ? · You have increased swelling in your legs, ankles, or feet. ? · You are suddenly so tired or weak that you cannot do your usual activities. ? Watch closely for changes in your health, and be sure to contact your doctor if you develop new symptoms. Where can you learn more? Go to http://lindsay-kai.info/. Enter R792 in the search box to learn more about \"Avoiding Triggers With Heart Failure: Care Instructions. \" Current as of: September 21, 2016 Content Version: 11.4 © 6663-4714 [employer]. Care instructions adapted under license by [employer] (which disclaims liability or warranty for this information). If you have questions about a medical condition or this instruction, always ask your healthcare professional. [address] any warranty or liability for your use of this information. ACO Transitions of Care Introducing [employer] Katy Nair offers a voluntary care coordination program to provide high quality service and care to [facility] fee-for-service beneficiaries. Umang Casas was designed to help you enhance your health and well-being through the following services: ? Transitions of Care  support for individuals who are transitioning from one care setting to another (example: Hospital to home). ? Chronic and Complex Care Coordination  support for individuals and caregivers of those with serious or chronic illnesses or with more than one chronic (ongoing) condition and those who take a number of different medications. If you meet specific medical criteria, a [facility] [address] may call you directly to coordinate your care with your primary care physician and your other care providers.  
 
For questions about the [facility] programs, please, contact your physicians office. For general questions or additional information about Accountable Care Organizations: 
Please visit www.medicare.gov/acos. html or call 1-800-MEDICARE (3-424.470.5654) TTY users should call 6-219.534.6117. [email or web address] Announcement We are excited to announce that we are making your provider's discharge notes available to you in [email or web address]. You will see these notes when they are completed and signed by the physician that discharged you from your recent hospital stay. If you have any questions or concerns about any information you see in [email or web address], please call the Health Information Department where you were seen or reach out to your Primary Care Provider for more information about your plan of care. Introducing [facility]! Dear Pj Santillan: Thank you for requesting a [email or web address] account. Our records indicate that you already have an active [email or web address] account. You can access your account anytime at [email or web address] Did you know that you can access your hospital and ER discharge instructions at any time in [email or web address]? You can also review all of your test results from your hospital stay or ER visit. Additional Information If you have questions, please visit the Frequently Asked Questions section of the [email or web address] website at [email or web address] Remember, [email or web address] is NOT to be used for urgent needs. For medical emergencies, dial 911. Now available from your iPhone and Android! Introducing Baltazar Alvarez As a Maritza Aw patient, I wanted to make you aware of our electronic visit tool called Baltazar Jonojoycelyn. Maritza Aw 24/7 allows you to connect within minutes with a medical provider 24 hours a day, seven days a week via a mobile device or tablet or logging into a secure website from your computer. You can access Baltazar Alvarez from anywhere in the [place]. A virtual visit might be right for you when you have a simple condition and feel like you just dont want to get out of bed, or cant get away from work for an appointment, when your regular [facility] provider is not available (evenings, weekends or holidays), or when youre out of town and need minor care. Electronic visits cost only $49 and if the Kin[employer] 24/7 provider determines a prescription is needed to treat your condition, one can be electronically transmitted to a nearby pharmacy*. Please take a moment to enroll today if you have not already done so. The enrollment process is free and takes just a few minutes. To enroll, please download the Kinnek 24/Kinetic mitch to your tablet or phone, or visit [email or web address] to enroll on your computer. And, as an [address] [place] [address] patient with a [employer] account, the results of your visits will be scanned into your electronic medical record and your primary care provider will be able to view the scanned results. We urge you to continue to see your regular [facility] provider for your ongoing medical care. And while your primary care provider may not be the one available when you seek a [employer] virtual visit, the peace of mind you get from getting a real diagnosis real time can be priceless. For more information on Konyelenafin, view our Frequently Asked Questions (FAQs) at [email or web address]. Sincerely, 
 
Chanda Huynh MD 
Chief Medical Officer [facility] Katy Nair *:  certain medications cannot be prescribed via [employer] Unresulted tests-please follow up with your PCP on these results Procedure/Test Authorizing Provider  C REACTIVE PROTEIN, QT Greg Bertrand MD  
 CBC W/O MD LINDA Patrick W/O MD LINDA Patrick W/O MD LINDA Patrick W/O MD LINDA Patrick/O IRMA Bertrand MD  
 CBC WITH AUTOMATED DIFF Deshawn Headley MD  
 CBC WITH AUTOMATED DIFF Cozetta Common, MD  
 CBC WITH AUTOMATED DIFF Cozegustavoa Festus, MD  
 CBC WITH AUTOMATED DIFF Lakeisha Huy,   
 CBC WITH AUTOMATED DIFF Starling Ayanna, MD  
 CBC WITH AUTOMATED DIFF Starling Ayanna, MD  
 CBC WITH AUTOMATED DIFF Starmargo Urena, MD  
 CBC WITH AUTOMATED DIFF Starling Ayanna, MD  
 CBC WITH AUTOMATED DIFF Starling Ayanna, MD  
 CBC WITH AUTOMATED DIFF Roxane Bue, NP  
 CT BX BONE MARROW DIAGNOSTIC Jose M Rocha, NP  
 CT CHEST WO CONT Alma Prajapati MD  
 EKG, 12 LEAD, Jeanann Ormond, MD  
 EKG, 12 LEAD, INITIAL Feliberto Ramirez MD  
 FERRITIN Carole Diaz MD  
 [address] [facility], MD  
 HEMOGLOBIN A1C WITH EAG Alma Prajapati MD  
 HEP B SURFACE AG Brandy Garcia MD  
 HEPATITIS PANEL, ACUTE Xochilt Mortensen MD  
 HGB & HCT Nguyen Harrison MD  
 IR INSERT NON TUNL CVC OVER 5 YRS Xochilt Mortensen MD  
 IR INSERT NON TUNL CVC OVER [address] [facility] Pippa Castro MD  
 IR INSERT TUNL CVC W/O PORT OVER 5 YR Xochilt Mortensen MD  
 [address] [facility], MD  
 LACTIC ACID Alma Prajapati MD  
 LD Emelia Birch MD  
 MAGNESIUM Xochilt Mortensen MD  
 [address] \[facility] [place] [facility]\"" Bettie Kang MD  
 MAGNESIUM MD MEGHA Cruz MD  
 MAGNESIUM MD Obi Cruz 1420, MD Obi Morris [address], MD Obi Morris [address] Marley  
 METABOLIC PANEL, GARY Prajapati MD  
 METABOLIC PANEL, BASIC Lakeisha Son DO  
 METABOLIC PANEL, Anne Geller MD  
 METABOLIC PANEL, BASIC Padilla Williamson MD  
 PERIPHERAL SMEAR(S)-BONE MARROW CASE Apoorva Metz MD  
 PHOSPHORUS Judge Bruce, MD  
 PHOSPHORUS  Janis, MD  
 PROTHROMBIN TIME + INR  Janis, MD  
 PROTHROMBIN TIME + INR Padilla Williamson, MD  
 PROTHROMBIN TIME + INR Padilla Williamson, MD  
 PROTHROMBIN TIME + INR Bertis Curly, NP  
 PTT Kit Estimable, MD  
 PTT Kit Estimable, MD  
 PTT Padilla Williamson, MD  
 PTT Padilla Williamson MD  
 PTT Padilla Williamson, MD  
 PTT Padilla Williamson, MD  
 PTT Paidlla Williamson MD  
 RENAL FUNCTION PANEL Angie Angulo MD  
 RESPIRATORY Glendell Amelie Judge Janis MD  
 RETICULOCYTE COUNT Thompson Howard MD  
 S. PNEUMO AG, UR/CSF Judge Janis MD  
 TROPONIN I Judge Janis MD  
 TROPONIN I Yara Odom DO  
 URINE CULTURE HOLD SAMPLE Judge Janis MD  
 XR CHEST PA LAT Yara Odom DO  
 XR Erna Vuong MD  
 XR Lou Little MD  
 XR CHEST PORT Bibi Schulz NP  
 XR CHEST [address], NP Providers Seen During Your Hospitalization Provider Specialty Primary office phone Den Webb DO Emergency Medicine [phone] Jareth Dukes MD Internal Medicine [phone] Karine Crum MD Internal Medicine [phone] Sil Lake MD Internal Medicine [phone] Taylor Andre MD Hospitalist [phone] Your Primary Care Physician (PCP) Primary Care Physician Office Phone Office Fax Ger Corley [phone] You are allergic to the following Allergen Reactions Contrast Dye (Iodine) Anaphylaxis Tolerates when pre medicated w/ IV solumedrol and benadryl prior to procedure Levaquin (Levofloxacin) Nausea and Vomiting Morphine Hives Itching Recent Documentation Height Weight BMI OB Status Smoking Status 1.778 m 141 kg 44.6 kg/m2 Hysterectomy Former Smoker Emergency Contacts Name Discharge Info Relation Home Work Mobile [address] [place] [address] CAREGIVER [3] Spouse [3] [phone] Matthew Cruz  Spouse [3] [phone] Patient Belongings The following personal items are in your possession at time of discharge: 
  Dental Appliances: None  Visual Aid: Glasses, At bedside      Home Medications: None   Jewelry: With patient  Clothing: At bedside    Other Valuables: At bedside Please provide this summary of care documentation to your next provider. Signatures-by signing, you are acknowledging that this After Visit Summary has been reviewed with you and you have received a copy. Patient Signature:  ____________________________________________________________ Date:  ____________________________________________________________  
  
Larri Hazard Provider Signature:  ____________________________________________________________ Date:  ____________________________________________________________

## 2018-05-30 NOTE — LETTER
NOTIFICATION RETURN TO WORK / SCHOOL 
 
6/6/2018 9:02 AM 
 
Ms. Sonal Garrison [address] [address] To Whom It May Concern: 
 
Sonal Garrison had an invasive cardiac procedure on 6/6/18. Her daughter and  were present. If you have any questions please call the office at [phone]. Sincerely, Haydee Martin NP

## 2018-05-30 NOTE — PROGRESS NOTES
Reason for Admission: abnormal lab results                  RRAT Score: 33                 Resources/supports as identified by patient/family:     Pt has adequate family support              Top Challenges facing patient (as identified by patient/family and CM):  Pt.  CKD stage 4, worse than baseline, the ptwas found to be anemic at [address] and was not able to receive her scheduled Procrit shot. The pt is not on dialysis but has a port in place for future dialyzing. Finances/Medication cost?  Pt has prescription coverage, disabled medicare and [address]? Pt occassionally drives, pt's spouse can provide transportation              Support system or lack thereof? Spouse Conception [place] [phone]  Living arrangements? Pt lives with spouse              Self-care/ADLs/Cognition? Pt is disabled, pt needs some assit with adl's           Current Advanced Directive/Advance Care Plan:  No                          Plan for utilizing home health:    no                      Likelihood of readmission: High                  Transition of Care Plan:  CM met with pt at bedside, CM to follow for any discharge needs  CM lives with her spouse Jin Sites [phone]. Pt lives on story home with a few entry steps. DME to include, oxygen/inogen, nebulizer, glucometer, cane, walker, rollator, BSC   Notified Nurse Navigator       Pt under Obsvervation status. CM provided obsv state and Medicare obsv letter, signed copies will be scanned to chart.

## 2018-05-30 NOTE — ED TRIAGE NOTES
Hx of kidney disease, takes procrit, was sent from [facility] outpatient infusion center for how hgb/hc (hgb 7.8, hct 24.7), referred by Dr. Zara Darden with nephrology. Pt also see's Dr. Ilana Lozada and for CHF and has been having intermittent chest pain. Patient took nitro at home for pain, last dose was last night. C/o increasing SOB, on 2L O2 at home.

## 2018-05-30 NOTE — H&P
[facility]    Hospitalist Admission Note      NAME:  Marisa Flores   :   1955   MRN:  [id number]     PCP:  Rangel Lockwood DO     Date/Time:  2018 5:46 PM          Subjective:     CHIEF COMPLAINT: Sent by nephrology    HISTORY OF PRESENT ILLNESS:     Ms. Reina Lopez is a 63 y.o.  female who presented to the Emergency Department complaining of CP, sent here by nephrology. She does not know why they sent her. She feels near baseline. She has chronic chest pain, and chronic dyspnea. Well known to cardiology, and Dr Watson Perkins has plans to arrange for a 6th cath. ER workup shows no evidence of acute coronary issues, and no hypoxia on her chronic oxygen. Anemia is chronic an stable. Vital signs are stable. We will admit her for observation.       Past Medical History:   Diagnosis Date     Sleep Apnea 2011    Aortic aneurysm (HCC)     CAD (coronary Artery Disease) Native Artery 2011    CKD (chronic kidney disease) stage 4, GFR 15-29 ml/min ([address] 2/10/2017    COPD (chronic obstructive pulmonary disease) (HCC)     Diabetic gastroparesis (HCC)     Diastolic heart failure ([address] 10/5/2012    DM type 2 causing neurological disease ([address]     DM type 2 causing renal disease ([address]     DM type 2 causing vascular disease ([address]     Esophageal stricture     dialted Dr. Ginette Ross G6PD deficiency ([address]     trait    GERD (gastroesophageal reflux disease)     Gout     ILD (interstitial lung disease) ([address]     open lung bx CJW     Morbid obesity ([address]     OA (osteoarthritis)     Ovarian cancer ([address]     cervical and uterine    Rheumatoid arteritis     S/P left pulmonary artery pressure sensor implant placement 2017    [employer]     Tobacco use disorder 3/21/2012    Uterine cervix cancer ([address]     Vitamin D deficiency 2013        Past Surgical History:   Procedure Laterality Date    CARDIAC SURG PROCEDURE UNLIST      stents    COLONOSCOPY N/A 6/24/2016    COLONOSCOPY performed by Sarah Mcnamara MD at [address] [facility] HX APPENDECTOMY      HX CARPAL TUNNEL RELEASE      bilateral    HX CHOLECYSTECTOMY      HX HERNIA REPAIR      HX HYSTERECTOMY      HX ORTHOPAEDIC  11/12/12    right knee replacement    HX TONSIL AND ADENOIDECTOMY      UPPER GI ENDOSCOPY,VINOD W GUIDE  6/24/2016            Social History   Substance Use Topics    Smoking status: Former Smoker     Packs/day: 0.50     Years: 41.00     Types: Cigarettes     Quit date: 11/9/2014    Smokeless tobacco: Never Used    Alcohol use No        Family History   Problem Relation Age of Onset    Heart Disease Mother     Heart Disease Brother         Allergies   Allergen Reactions    Contrast Dye [Iodine] Anaphylaxis    Levaquin [Levofloxacin] Nausea and Vomiting    Morphine Hives and Itching        Prior to Admission medications    Medication Sig Start Date End Date Taking? Authorizing Provider   allopurinol (ZYLOPRIM) 100 mg tablet TAKE 1 TABLET BY MOUTH EVERY DAY 5/10/18   Sonia Levine DO   warfarin (COUMADIN) 2 mg tablet Take 1 Tab by mouth Every Friday. And saturday 4/13/18   Sonia Levine, DO   warfarin (COUMADIN) 3 mg tablet Take 1 Tab by mouth five (5) days a week. Sun-Thurs 4/13/18   Hallie Levine DO   bumetanide (BUMEX) 2 mg tablet Take 1 Tab by mouth two (2) times a day. 4/5/18   Arturo Harden MD   oxyCODONE-acetaminophen (PERCOCET 7.5) 7.5-325 mg per tablet Take 1 Tab by mouth every eight (8) hours as needed for Pain. Max Daily Amount: 3 Tabs. 4/3/18   Hallie Levine DO   amLODIPine (NORVASC) 5 mg tablet Take 2 Tabs by mouth daily. 3/24/18   Arturo Harden MD   ferrous gluconate 324 mg (38 mg iron) tablet Take 1 Tab by mouth daily (with breakfast). 11/22/17   Danie Mcneill V, DO   polyethylene glycol (MIRALAX) 17 gram packet Take 1 Packet by mouth daily as needed.  For constipation if no BM in 48 hours 11/22/17   [facility], DO   senna-docusate (PERICOLACE) 8.6-50 mg per tablet Take 1 Tab by mouth daily. 11/22/17   Cassandra Chung Jr V, DO   allopurinol (ZYLOPRIM) 100 mg tablet Take 100 mg by mouth daily. Historical Provider   cetirizine (ZYRTEC) 10 mg tablet Take 10 mg by mouth daily as needed for Allergies. Historical Provider   metOLazone (ZAROXOLYN) 5 mg tablet Take 5 mg by mouth daily as needed. Historical Provider   albuterol (PROAIR HFA) 90 mcg/actuation inhaler Take 2 Puffs by inhalation every four (4) hours as needed for Wheezing. Historical Provider   carvedilol (COREG) 25 mg tablet TAKE 1 TABLET BY MOUTH TWICE A DAY 11/2/17   Noelle Ronquillo MD   isosorbide mononitrate ER (IMDUR) 120 mg CR tablet TAKE 1 TABLET BY MOUTH EVERY DAY 10/19/17   Noelle Ronquillo MD   pantoprazole (PROTONIX) 40 mg tablet TAKE 1 TABLET BY MOUTH EVERY DAY FOR HEARTBURN 10/18/17   Claudia Doll MD   hydrOXYzine pamoate (VISTARIL) 25 mg capsule Take 1 Cap by mouth three (3) times daily as needed for Anxiety. 9/5/17   Hallie Levine,    albuterol (PROVENTIL VENTOLIN) 2.5 mg /3 mL (0.083 %) nebulizer solution 2.5 mg by Nebulization route every four (4) hours as needed. 3/20/17   Historical Provider   insulin aspart protamine/insulin aspart (NOVOLOG MIX 70-30) 100 unit/mL (70-30) injection by SubCUTAneous route three (3) times daily as needed. Uses sliding scale     Historical Provider   ondansetron (ZOFRAN ODT) 4 mg disintegrating tablet Take 1 Tab by mouth every six (6) hours as needed for Nausea. 2/11/17   Og Mariscal MD   fluticasone (FLONASE) 50 mcg/actuation nasal spray 2 Sprays by Both Nostrils route nightly as needed. Historical Provider   calcitRIOL (ROCALTROL) 0.25 mcg capsule Take 0.25 mcg by mouth four (4) days a week. Mon, Wed, Fr, Sat    Historical Provider   nitroglycerin (NITROSTAT) 0.3 mg SL tablet 1 Tab by SubLINGual route every five (5) minutes as needed for Chest Pain.  6/24/16   Noelle Ronquillo MD   ergocalciferol (VITAMIN D2) 50,000 unit capsule Take 50,000 Units by mouth every Tuesday and Thursday. Abhijit Frey MD   aspirin 81 mg tablet Take 81 mg by mouth daily. Abhijit Frey MD   atorvastatin (LIPITOR) 80 mg tablet Take 80 mg by mouth every evening.       Historical Provider       Review of Systems:  (bold if positive, if negative)    Gen:  Eyes:  ENT:  CVS:  chest pain,Pulm:  dyspneaGI:    :    MS:  Skin:  Psych:  Endo:    Hem:  Renal:    Neuro:        Objective:      VITALS:    Vital signs reviewed; most recent are:    Visit Vitals    /56    Pulse 70    Temp 98 °F (36.7 °C)    Resp 15    Wt 152 kg (335 lb)    SpO2 95%    BMI 48.07 kg/m2     SpO2 Readings from Last 6 Encounters:   05/30/18 95%   05/15/18 91%   04/03/18 96%   03/20/18 98%   01/24/18 98%   01/10/18 95%    O2 Flow Rate (L/min): 2 l/min   No intake or output data in the 24 hours ending 05/30/18 8296     Exam:     Physical Exam:    Gen:  Morbid obese, in no acute distress  HEENT:  Pink conjunctivae, PERRL, hearing intact to voice, moist mucous membranes  Neck:  Supple, without masses, thyroid non-tender  Resp:  No accessory muscle use, distant breath sounds without wheezes rales or rhonchi  Card:  No murmurs, distant S1, S2 without thrills, bruits or peripheral edema  Abd:  Soft, non-tender, non-distended, distant bowel sounds are present, no mass  Lymph:  No cervical or inguinal adenopathy  Musc:  No cyanosis or clubbing  Skin:  No rashes or ulcers, skin turgor is good  Neuro:  Cranial nerves are grossly intact, mild motor weakness, follows commands   Psych:  Good insight, oriented to person, place and time, alert     Labs:    Recent Labs      05/30/18   1530   WBC  8.6   HGB  7.8*   HCT  25.3*   PLT  172     Recent Labs      05/30/18   1530   NA  144   K  3.8   CL  108   CO2  23   GLU  196*   BUN  51*   CREA  3.58*   CA  8.7     Lab Results   Component Value Date/Time    Glucose (POC) 159 (H) 11/22/2017 07:01 AM    Glucose (POC) 156 (H) 11/21/2017 10:01 PM     No results for input(s): PH, PCO2, PO2, HCO3, FIO2 in the last 72 hours. No results for input(s): INR in the last 72 hours. No lab exists for component: INREXT  All Micro Results     None          I have reviewed previous records       Assessment and Plan:      Chest pain / Dyspnea - ER workup negative for AMI. Check AM troponin. Likey chronic, and just as likely non-cardiac from any number of other issues, DDx gastroparesis, COPD, JASEN, RA, DJD, etc.  Consult cardiology, but due to her worsening CKD, a claimed anaphylaxis to contrast dye, and negative workup, it would not be my recommendation to cath. CAD (coronary Artery Disease) Native Artery / Chronic diastolic heart failure / HTN (hypertension) - POA, appears stable to me. Continue Bumex, metolazone, coreg, IMDUR, ASA, statin    Morbid obesity / JASEN on CPAP - continue CPAP. Advise weight loss. Chronic respiratory failure with hypoxia / COPD, severe / ILD (interstitial lung disease) / Pulmonary HTN - Continue her usual home oxygen at 2L/min, prn duonebs, flonase. Gastroparesis / Esophageal reflux - Continue PPI    Normocytic anemia - Due to chronic kidney disease. Usually take PO iron. DM (diabetes mellitus), type 2 with renal, vascular and neurological complications - Diabetic diet and counseling. SSI per protocol. A1c useless in setting of anemia. CKD (chronic kidney disease) stage 4 - Worse than baseline. Consult nephrology, Dr Remy Esparza, who sent her here. They may consider reducing bumex. Hx of deep venous thrombosis / Warfarin anticoagulation - Resume coumadin. Pharmacy to dose    Gout - no symptoms, resume allopurinol.        Telemetry reviewed:   normal sinus rhythm    Risk of deterioration: high      Total time spent with patient: 64 [address] [facility] discussed with: Patient, Family, Nursing Staff, Consultant/Specialist and >50% of time spent in counseling and coordination of care    Discussed: Care Plan       ___________________________________________________    Attending Physician: Kassy Phelan MD

## 2018-05-30 NOTE — CONSULTS
Reason for Consult: Chest pain. HPI: Lorene Cannon is a 63 y.o. female with significant past medical history of CAD, PCI,  of the RCA, CardioMEMS, anemia, end-stage renal disease, DVT, who has been receiving Procrit shots since January 2018. She was at Dr. Wilbert Sandhu office today and expressed to his nurse that she has been using nitroglycerin on an daily basis for chest pain which raise concern for anginal symptoms therefore she was referred to the ER. On presentation to the ER she confirmed having daily chest pain, retrosternal, pressure-like sensation, nonradiating, associated with shortness of breath, relieved with nitroglycerin tablets from last few weeks. She has to use nitroglycerin tablets almost on daily basis. The pain is nonexertional and sometimes present on rest as well. EKG at presentation does not demonstrate any ischemic changes. Troponin is negative. She is admitted for observation and further evaluation. Ekg: normal sinus rhythm, normal ST segment, normal axis, normal intervals. Trop 0.04  Hb 7.8    CATH: 2004: 1v CAD by cath - PCI OM with SAL  CATH 5/2004 - patent stent, no new disease   Lexiscan cardiolite 12/09- normal perfusion, EF 66%  ECHO 11/2009: LVH, EF 85%, diastolic dysfunction  STRESS/LEXISCAN/CARDIOLITE 3/29/11: normal perfusion, EF 62%  CATH: 3/20/2012 :PA 55/30 mean 41, wedge 26, RA 24, EDP 35, LVG 55%, LM normal, LAD normal, LCX - OM1 patent stent, OM2 prox 85% long, % w/ L -> R collateral; s/p SAL-> OM2/OM3 with SAL. CATH: 10/4/2012: EDP 25, EF 55%, LM nl, LAD mild plaque, LCX patent OM stents, % prox with good L to R collaterals. Cath 3/18/2014 - RA 9 PA 42/19/29, PCW 12, EDP 25, no LVG due to CKD, LM nl, LAD mild plaque, LCX patent stents OM1/OM2, RCA chronic proximal occlusion with L to R collaterals. ECHO 4/11/16: EF 60-65%. No WMA. LA mildly dilated. Lexiscan Cardiolite 4/11/16: Normal. LVEF 55%.  Compared to 3/29/11, no significant changes. RHC/CardioMEMS implant 8/31/17 - RA 12, PA 56/20/30, CI ([place]te) 2.65    Echo 11/30/17 - EF 65%. G1DD. No WMA. Wall thickness was mildly to moderately increased. Limited Echo 12/13/17 - Tricuspid valve: Pulmonary artery diastolic pressure: 84.96 mmHg. Plan:    1. Chest pain/CAD: Known to have significant RCA disease with prior PCI of the OM branches of the LCx. Chest pain could be cardiac exacerbated by severe anemia, renal failure. Cycle troponins. No anticoagulation at this time. Further evaluation with a cardiac catheterization is recommended however given severe anemia and CKD and will be difficult to proceed further unless there is plans for dialysis. Also need recommendations from hematology about her anemia. For now continue aspirin, NTG for chest pain. Continue Coreg. Continue Lipitor. 2.  History of diastolic congestive heart failure with prior history of pulmonary hypertension. She now has cardiomems which appears to have drifted. Dr. Cailin Fried to schedule right heart catheterization to reposition the sensor. 3.  Hypertension: Continue Coreg. Use hydralazine as needed for blood pressure spikes. 4.  Severe anemia: She has sickle cell trait, G6PD deficiency, other blood disorders. Used to see Dr. Esequiel Jose many years ago. Consult Dr. Richter Monday to further assess hematological disorders. 5.  Has left lower extremity DVT. Continue Coumadin.           Past Medical History:   Diagnosis Date     Sleep Apnea 2/16/2011    Aortic aneurysm (HCC)     CAD (coronary Artery Disease) Native Artery 2/16/2011    CKD (chronic kidney disease) stage 4, GFR 15-29 ml/min ([facility]) 2/10/2017    COPD (chronic obstructive pulmonary disease) ([facility])     Diabetic gastroparesis ([facility])     Diastolic heart failure ([address] 10/5/2012    DM type 2 causing neurological disease ([address]     DM type 2 causing renal disease ([address]     DM type 2 causing vascular disease ([address]     Esophageal stricture 2012    dialted  woogen    G6PD deficiency ([facility]     trait    GERD (gastroesophageal reflux disease)     Gout     ILD (interstitial lung disease) (HCC)     open lung bx CJW 2010    Morbid obesity ([facility]     OA (osteoarthritis)     Ovarian cancer ([facility]     cervical and uterine    Rheumatoid arteritis     S/P left pulmonary artery pressure sensor implant placement 8/31/2017    Cardiomems     Tobacco use disorder 3/21/2012    Uterine cervix cancer ([facility]     Vitamin D deficiency 8/9/2013            Past Surgical History:   Procedure Laterality Date    CARDIAC SURG PROCEDURE UNLIST      stents    COLONOSCOPY N/A 6/24/2016    COLONOSCOPY performed by Josephine Rouse MD at [facility] HX APPENDECTOMY      HX CARPAL TUNNEL RELEASE      bilateral    HX CHOLECYSTECTOMY      HX HERNIA REPAIR      HX HYSTERECTOMY      HX ORTHOPAEDIC  11/12/12    right knee replacement    HX TONSIL AND ADENOIDECTOMY      UPPER GI ENDOSCOPY,DILATN W GUIDE  6/24/2016                  Family History   Problem Relation Age of Onset    Heart Disease Mother     Heart Disease Brother            Social History     Social History    Marital status:      Spouse name: N/A    Number of children: N/A    Years of education: N/A     Occupational History    Not on file.      Social History Main Topics    Smoking status: Former Smoker     Packs/day: 0.50     Years: 41.00     Types: Cigarettes     Quit date: 11/9/2014    Smokeless tobacco: Never Used    Alcohol use No    Drug use: No    Sexual activity: Not on file     Other Topics Concern    Not on file     Social History Narrative         Allergies   Allergen Reactions    Contrast Dye [Iodine] Anaphylaxis    Levaquin [Levofloxacin] Nausea and Vomiting    Morphine Hives and Itching            Current Facility-Administered Medications   Medication Dose Route Frequency Provider Last Rate Last Dose    albuterol (PROVENTIL VENTOLIN) nebulizer solution 2.5 mg  2.5 mg Nebulization Q6H PRN MD David Buck ON 5/31/2018] allopurinol (ZYLOPRIM) tablet 100 mg  100 mg Oral DAILY Jacy Kaye MD        [START ON 5/31/2018] aspirin tablet 81 mg  81 mg Oral DAILY Jacy Kaye MD        atorvastatin (LIPITOR) tablet 80 mg  80 mg Oral QHS Jacy Kaye MD        bumetanide [facility]) tablet 2 mg  2 mg Oral BID Jacy Kaye MD        [START ON 5/31/2018] calcitRIOL (ROCALTROL) capsule 0.25 mcg  0.25 mcg Oral Once per day on Mon Tue Wed Thu Jacy Kaye MD        carvedilol (COREG) tablet 25 mg  25 mg Oral BID WITH MEALS Jacy Kaye MD        cetirizine (ZYRTEC) tablet 10 mg  10 mg Oral DAILY PRN Jacy Kaye MD        [START ON 5/31/2018] ergocalciferol (ERGOCALCIFEROL) capsule 50,000 Units  50,000 Units Oral EVERY TU & TH Jacy Kaye MD        [START ON 5/31/2018] fluticasone (FLONASE) 50 mcg/actuation nasal spray 2 Spray  2 Spray Both Nostrils DAILY Jacy Kaye MD        hydrOXYzine HCl (ATARAX) tablet 25 mg  25 mg Oral TID PRN Jacy Kaye MD        [START ON 5/31/2018] isosorbide mononitrate ER (IMDUR) tablet 120 mg  120 mg Oral DAILY Jacy Kaye MD        oxyCODONE-acetaminophen (PERCOCET 7.5) 7.5-325 mg per tablet 1 Tab  1 Tab Oral Q8H PRN Jacy Kaey MD        [START ON 5/31/2018] pantoprazole (PROTONIX) tablet 40 mg  40 mg Oral ACB Jacy Kaye MD        [START ON 5/31/2018] polyethylene glycol (MIRALAX) packet 17 g  17 g Oral DAILY Jacy Kaye MD        [START ON 5/31/2018] senna-docusate (PERICOLACE) 8.6-50 mg per tablet 1 Tab  1 Tab Oral DAILY Jacy Kaye MD        sodium chloride (NS) flush 5-10 mL  5-10 mL IntraVENous Q8H Jacy Kaye MD        sodium chloride (NS) flush 5-10 mL  5-10 mL IntraVENous PRN Jacy Kaye MD        naloxone [facility]) injection 0.4 mg  0.4 mg IntraVENous PRN Jacy Kaye MD        glucose chewable tablet 16 g  4 Tab Oral PRN Betty Castro Marcus Wesley MD        dextrose (D50W) injection syrg 12.5-25 g  12.5-25 g IntraVENous PRN Chantelle Becerra MD        glucagon [facility]) injection 1 mg  1 mg IntraMUSCular PRN Chantelle Becerra MD        acetaminophen (TYLENOL) tablet 650 mg  650 mg Oral Q4H PRN Chantelle Becerra MD        diphenhydrAMINE (BENADRYL) capsule 25 mg  25 mg Oral Q4H PRN Chantelle Becerra MD        ondansetron [facility]) injection 4 mg  4 mg IntraVENous Q4H PRN Chantelle Becerra MD        insulin lispro (HUMALOG) injection   SubCUTAneous AC&HS Chantelle Becerra MD         Current Outpatient Prescriptions   Medication Sig Dispense Refill    allopurinol (ZYLOPRIM) 100 mg tablet TAKE 1 TABLET BY MOUTH EVERY DAY 30 Tab 5    warfarin (COUMADIN) 2 mg tablet Take 1 Tab by mouth Every Friday. And saturday 30 Tab 11    warfarin (COUMADIN) 3 mg tablet Take 1 Tab by mouth five (5) days a week. Sun-Thurs 30 Tab 11    bumetanide (BUMEX) 2 mg tablet Take 1 Tab by mouth two (2) times a day. 60 Tab 4    oxyCODONE-acetaminophen (PERCOCET 7.5) 7.5-325 mg per tablet Take 1 Tab by mouth every eight (8) hours as needed for Pain. Max Daily Amount: 3 Tabs. 90 Tab 0    amLODIPine (NORVASC) 5 mg tablet Take 2 Tabs by mouth daily. 30 Tab 5    ferrous gluconate 324 mg (38 mg iron) tablet Take 1 Tab by mouth daily (with breakfast). 30 Tab 0    polyethylene glycol (MIRALAX) 17 gram packet Take 1 Packet by mouth daily as needed. For constipation if no BM in 48 hours 30 Packet 0    senna-docusate (PERICOLACE) 8.6-50 mg per tablet Take 1 Tab by mouth daily. 30 Tab 0    allopurinol (ZYLOPRIM) 100 mg tablet Take 100 mg by mouth daily.  cetirizine (ZYRTEC) 10 mg tablet Take 10 mg by mouth daily as needed for Allergies.  metOLazone (ZAROXOLYN) 5 mg tablet Take 5 mg by mouth daily as needed.  albuterol (PROAIR HFA) 90 mcg/actuation inhaler Take 2 Puffs by inhalation every four (4) hours as needed for Wheezing.       carvedilol (COREG) 25 mg tablet TAKE 1 TABLET BY MOUTH TWICE A  Tab 3    isosorbide mononitrate ER (IMDUR) 120 mg CR tablet TAKE 1 TABLET BY MOUTH EVERY DAY 90 Tab 3    pantoprazole (PROTONIX) 40 mg tablet TAKE 1 TABLET BY MOUTH EVERY DAY FOR HEARTBURN 90 Tab 2    hydrOXYzine pamoate (VISTARIL) 25 mg capsule Take 1 Cap by mouth three (3) times daily as needed for Anxiety. 30 Cap 1    albuterol (PROVENTIL VENTOLIN) 2.5 mg /3 mL (0.083 %) nebulizer solution 2.5 mg by Nebulization route every four (4) hours as needed.  insulin aspart protamine/insulin aspart (NOVOLOG MIX 70-30) 100 unit/mL (70-30) injection by SubCUTAneous route three (3) times daily as needed. Uses sliding scale       ondansetron (ZOFRAN ODT) 4 mg disintegrating tablet Take 1 Tab by mouth every six (6) hours as needed for Nausea. 30 Tab 0    fluticasone (FLONASE) 50 mcg/actuation nasal spray 2 Sprays by Both Nostrils route nightly as needed.  calcitRIOL (ROCALTROL) 0.25 mcg capsule Take 0.25 mcg by mouth four (4) days a week. Mon, Wed, Fr, Sat      nitroglycerin (NITROSTAT) 0.3 mg SL tablet 1 Tab by SubLINGual route every five (5) minutes as needed for Chest Pain. 1 Bottle 1    ergocalciferol (VITAMIN D2) 50,000 unit capsule Take 50,000 Units by mouth every Tuesday and Thursday.  aspirin 81 mg tablet Take 81 mg by mouth daily.  atorvastatin (LIPITOR) 80 mg tablet Take 80 mg by mouth every evening. ROS:  12 point review of systems was performed.  All negative except for HPI     Physical Exam:  Visit Vitals    /56    Pulse 72    Temp 98 °F (36.7 °C)    Resp 15    Wt 335 lb (152 kg)    SpO2 94%    BMI 48.07 kg/m2       Gen:  Well-developed, well-nourished, in no acute distress  HEENT:  Pale conjunctivae, PERRL, hearing intact to voice, moist mucous membranes  Neck:  Supple, without masses, thyroid non-tender  Resp:  No accessory muscle use, clear breath sounds without wheezes rales or rhonchi  Card:  No murmurs, normal S1, S2 without thrills, bruits . B/L 1+ peripheral edema  Abd:  Soft, non-tender, non-distended, normoactive bowel sounds are present, no palpable organomegaly and no detectable hernias  Lymph:  No cervical or inguinal adenopathy  Musc:  No cyanosis or clubbing  Skin:  No rashes or ulcers, skin turgor is good  Neuro:  Cranial nerves are grossly intact, no focal motor weakness, follows commands appropriately  Psych:  Good insight, oriented to person, place and time, alert     Labs:     Lab Results   Component Value Date/Time    WBC 8.6 05/30/2018 03:30 PM    HGB 7.8 (L) 05/30/2018 03:30 PM    Hemoglobin (POC) 9.9 (L) 07/21/2013 09:51 PM    HCT 25.3 (L) 05/30/2018 03:30 PM    Hematocrit (POC) 29 (L) 07/21/2013 09:51 PM    PLATELET 194 68/62/3787 03:30 PM    MCV 97.3 05/30/2018 03:30 PM     Lab Results   Component Value Date/Time    Hemoglobin A1c 5.8 11/22/2017 04:55 AM    Hemoglobin A1c 6.9 (H) 03/03/2017 04:13 AM    Hemoglobin A1c 6.6 (H) 02/03/2017 03:30 AM    Glucose 196 (H) 05/30/2018 03:30 PM    Glucose (POC) 159 (H) 11/22/2017 07:01 AM    LDL, calculated 92.6 11/09/2014 03:30 AM    Creatinine (POC) 2.1 (H) 07/21/2013 09:51 PM    Creatinine 3.58 (H) 05/30/2018 03:30 PM      Lab Results   Component Value Date/Time    Cholesterol, total 176 11/09/2014 03:30 AM    HDL Cholesterol 64 11/09/2014 03:30 AM    LDL, calculated 92.6 11/09/2014 03:30 AM    Triglyceride 97 11/09/2014 03:30 AM    CHOL/HDL Ratio 2.8 11/09/2014 03:30 AM     Lab Results   Component Value Date/Time    ALT (SGPT) 19 11/21/2017 12:40 PM    AST (SGOT) 24 11/21/2017 12:40 PM    Alk.  phosphatase 94 11/21/2017 12:40 PM    Bilirubin, direct 0.1 03/03/2017 04:13 AM    Bilirubin, total 0.5 11/21/2017 12:40 PM    Albumin 3.2 (L) 11/21/2017 12:40 PM    Protein, total 6.8 11/21/2017 12:40 PM    INR 3.3 (H) 05/30/2018 03:30 PM    Prothrombin time 34.1 (H) 05/30/2018 03:30 PM    PLATELET 718 58/16/0559 03:30 PM    Hepatitis B surface Ag 0.13 03/02/2017 05:01 PM Lab Results   Component Value Date/Time    INR 3.3 (H) 05/30/2018 03:30 PM    INR 3.2 (H) 11/22/2017 04:55 AM    INR 2.7 (H) 11/21/2017 12:40 PM    INR POC 3.1 05/15/2018 03:31 PM    INR POC 3.0 04/03/2018 02:11 PM    INR POC 3.0 02/22/2018 03:00 PM    Prothrombin time 34.1 (H) 05/30/2018 03:30 PM    Prothrombin time 33.0 (H) 11/22/2017 04:55 AM    Prothrombin time 28.4 (H) 11/21/2017 12:40 PM      Lab Results   Component Value Date/Time    GFR est non-AA 13 (L) 05/30/2018 03:30 PM    GFRNA, POC 24 (L) 07/21/2013 09:51 PM    GFR est AA 16 (L) 05/30/2018 03:30 PM    GFRAA, POC 30 (L) 07/21/2013 09:51 PM    Creatinine 3.58 (H) 05/30/2018 03:30 PM    Creatinine (POC) 2.1 (H) 07/21/2013 09:51 PM    BUN 51 (H) 05/30/2018 03:30 PM    BUN (POC) 30 (H) 07/21/2013 09:51 PM    Sodium 144 05/30/2018 03:30 PM    Sodium (POC) 140 07/21/2013 09:51 PM    Potassium 3.8 05/30/2018 03:30 PM    Potassium (POC) 3.9 07/21/2013 09:51 PM    Chloride 108 05/30/2018 03:30 PM    Chloride (POC) 109 (H) 07/21/2013 09:51 PM    CO2 23 05/30/2018 03:30 PM    Magnesium 1.6 03/10/2017 06:43 AM    Phosphorus 4.8 (H) 03/10/2017 06:43 AM    PTH, Intact 404.3 (H) 03/04/2017 05:02 AM     No results found for: PSA, PSA2, PSAR1, PSA1, PSAR2, PSA3, PSAR3, [id number], [id number], PSALT  No results found for: TSH, TSH2, TSH3, TSHP, TSHELE, TSHEXT, TT3, T3U, T3UP, FRT3, FT3, FT4, FT4P, T4, T4P, FT4T, TT7, TSHEXT   Lab Results   Component Value Date/Time    Glucose 196 (H) 05/30/2018 03:30 PM    Glucose (POC) 159 (H) 11/22/2017 07:01 AM      Lab Results   Component Value Date/Time    CK 43 03/03/2017 02:20 PM    CK - MB <1.0 03/03/2017 02:20 PM    CK-MB Index Cannot be calulated 03/03/2017 02:20 PM    Troponin-I, Qt. <0.04 05/30/2018 03:30 PM      Lab Results   Component Value Date/Time    NT pro- (H) 03/02/2017 04:42 AM    NT pro- (H) 02/28/2017 12:55 PM    NT pro- (H) 02/16/2017 01:42 PM    NT pro-BNP 72 07/01/2015 07:42 PM    NT pro- (H) 11/09/2014 12:01 AM      Lab Results   Component Value Date/Time    Sodium 144 05/30/2018 03:30 PM    Potassium 3.8 05/30/2018 03:30 PM    Chloride 108 05/30/2018 03:30 PM    CO2 23 05/30/2018 03:30 PM    Anion gap 13 05/30/2018 03:30 PM    Glucose 196 (H) 05/30/2018 03:30 PM    BUN 51 (H) 05/30/2018 03:30 PM    Creatinine 3.58 (H) 05/30/2018 03:30 PM    BUN/Creatinine ratio 14 05/30/2018 03:30 PM    GFR est AA 16 (L) 05/30/2018 03:30 PM    GFR est non-AA 13 (L) 05/30/2018 03:30 PM    Calcium 8.7 05/30/2018 03:30 PM      Lab Results   Component Value Date/Time    Sodium 144 05/30/2018 03:30 PM    Potassium 3.8 05/30/2018 03:30 PM    Chloride 108 05/30/2018 03:30 PM    CO2 23 05/30/2018 03:30 PM    Anion gap 13 05/30/2018 03:30 PM    Glucose 196 (H) 05/30/2018 03:30 PM    BUN 51 (H) 05/30/2018 03:30 PM    Creatinine 3.58 (H) 05/30/2018 03:30 PM    BUN/Creatinine ratio 14 05/30/2018 03:30 PM    GFR est AA 16 (L) 05/30/2018 03:30 PM    GFR est non-AA 13 (L) 05/30/2018 03:30 PM    Calcium 8.7 05/30/2018 03:30 PM    Bilirubin, total 0.5 11/21/2017 12:40 PM    ALT (SGPT) 19 11/21/2017 12:40 PM    AST (SGOT) 24 11/21/2017 12:40 PM    Alk.  phosphatase 94 11/21/2017 12:40 PM    Protein, total 6.8 11/21/2017 12:40 PM    Albumin 3.2 (L) 11/21/2017 12:40 PM    Globulin 3.6 11/21/2017 12:40 PM    A-G Ratio 0.9 (L) 11/21/2017 12:40 PM      Lab Results   Component Value Date/Time    Hemoglobin A1c 5.8 11/22/2017 04:55 AM         Recent Labs      05/30/18   1530   TROIQ  <0.04           Problem List:     Problem List  Date Reviewed: 5/30/2018          Codes Class Noted    Hx of deep venous thrombosis ICD-10-CM: Z86.718  ICD-9-CM: V12.51  5/30/2018        Diabetic gastroparesis (HCC) ICD-10-CM: E11.43, K31.84  ICD-9-CM: 250.60, 536.3  Unknown        GERD (gastroesophageal reflux disease) ICD-10-CM: K21.9  ICD-9-CM: 530.81  Unknown        Rheumatoid arteritis ICD-10-CM: I00  ICD-9-CM: 447.8  Unknown        DM type 2 causing neurological disease (HCC) ICD-10-CM: E11.49  ICD-9-CM: 250.60  Unknown        DM type 2 causing renal disease (HCC) ICD-10-CM: E11.29  ICD-9-CM: 250.40  Unknown        DM type 2 causing vascular disease (HCC) ICD-10-CM: E11.59  ICD-9-CM: 250.70, 443.81  Unknown        Gout ICD-10-CM: M10.9  ICD-9-CM: 274.9  Unknown        COPD (chronic obstructive pulmonary disease) ([facility] ICD-10-CM: J44.9  ICD-9-CM: 955  Unknown        Chest pain ICD-10-CM: R07.9  ICD-9-CM: 786.50  11/21/2017        ILD (interstitial lung disease) ([facility] ICD-10-CM: J84.9  ICD-9-CM: 515  8/20/2017        Warfarin anticoagulation ICD-10-CM: Z79.01  ICD-9-CM: V58.61  8/20/2017        COPD, severe ([facility] ICD-10-CM: J44.9  ICD-9-CM: 299  6/21/2017        CKD (chronic kidney disease) stage 4, GFR 15-29 ml/min (HCC) (Chronic) ICD-10-CM: N18.4  ICD-9-CM: 585.4  2/10/2017        DM (diabetes mellitus), type 2 with renal complications (HCC) (Chronic) ICD-10-CM: E11.29  ICD-9-CM: 250.40  8/9/2013        Normocytic anemia (Chronic) ICD-10-CM: D64.9  ICD-9-CM: 285.9  5/26/2013        Chronic diastolic heart failure (HCC) (Chronic) ICD-10-CM: I50.32  ICD-9-CM: 428.32  56/5/1533        Diastolic heart failure ([facility] ICD-10-CM: I50.30  ICD-9-CM: 428.30  10/5/2012        HTN (hypertension) (Chronic) ICD-10-CM: I10  ICD-9-CM: 401.9  10/1/2012        Morbid obesity (Chronic) ICD-10-CM: E66.01  ICD-9-CM: 278.01  10/1/2012        Esophageal reflux ICD-10-CM: K21.9  ICD-9-CM: 530.81  10/1/2012        Gastroparesis ICD-10-CM: K31.84  ICD-9-CM: 536.3  10/1/2012        Pulmonary HTN (Chronic) ICD-10-CM: I27.20  ICD-9-CM: 416.8  3/21/2012        CAD (coronary Artery Disease) Native Artery (Chronic) ICD-10-CM: I25.10  ICD-9-CM: 414.01  2/16/2011    Overview Addendum 10/5/2012  7:33 AM by JUSTICE WalkerP     [place] 2004  1v CAD by cath - PCI OM with SAL  Cath 5/2004 - patent stent, no new disease  Lexiscan cardiolite 12/09- normal perfusion, EF 66%  Cath: 3/19/12: PA 55/30 mean 41, wedge 26, RA 24, EDP 35, LVG 55%, LM normal, LAD normal, LCX - OM1 patent stent, OM2 prox 85% long, % with L to R collaterals                JASEN on CPAP (Chronic) ICD-10-CM: G47.33, Z99.89  ICD-9-CM: 327.23, V46.8  2/16/2011    Overview Signed 3/21/2012  8:04 AM by PRASHANT Maddox Dr. CPAP             Sleep apnea ICD-10-CM: G47.30  ICD-9-CM: 780.57  2/16/2011                Yuan Marin MD, [facility]

## 2018-05-30 NOTE — PROGRESS NOTES
5/30/2018 5:36 PM    Went to see patient. States she does not want to be admitted under [facility] and is requesting the hospitalist. Relayed info to Dr. Jacqui Maharaj.      Yeimi Yun, DO  Family Med Resident, PGY1

## 2018-05-31 LAB
ANION GAP SERPL CALC-SCNC: 11 MMOL/L (ref 5–15)
ATRIAL RATE: 78 BPM
BUN SERPL-MCNC: 56 MG/DL (ref 6–20)
BUN/CREAT SERPL: 16 (ref 12–20)
CALCIUM SERPL-MCNC: 8.4 MG/DL (ref 8.5–10.1)
CALCULATED P AXIS, ECG09: 37 DEGREES
CALCULATED R AXIS, ECG10: 15 DEGREES
CALCULATED T AXIS, ECG11: 59 DEGREES
CHLORIDE SERPL-SCNC: 107 MMOL/L (ref 97–108)
CO2 SERPL-SCNC: 24 MMOL/L (ref 21–32)
CREAT SERPL-MCNC: 3.61 MG/DL (ref 0.55–1.02)
DIAGNOSIS, 93000: NORMAL
ERYTHROCYTE [DISTWIDTH] IN BLOOD BY AUTOMATED COUNT: 14.6 % (ref 11.5–14.5)
FERRITIN SERPL-MCNC: 536 NG/ML (ref 8–252)
GLUCOSE BLD STRIP.AUTO-MCNC: 126 MG/DL (ref 65–100)
GLUCOSE BLD STRIP.AUTO-MCNC: 143 MG/DL (ref 65–100)
GLUCOSE BLD STRIP.AUTO-MCNC: 165 MG/DL (ref 65–100)
GLUCOSE BLD STRIP.AUTO-MCNC: 173 MG/DL (ref 65–100)
GLUCOSE SERPL-MCNC: 160 MG/DL (ref 65–100)
HAPTOGLOB SERPL-MCNC: 202 MG/DL (ref 30–200)
HCT VFR BLD AUTO: 24 % (ref 35–47)
HCT VFR BLD AUTO: 24.2 % (ref 35–47)
HGB BLD-MCNC: 7.4 G/DL (ref 11.5–16)
HGB BLD-MCNC: 7.5 G/DL (ref 11.5–16)
INR PPP: 3.8 (ref 0.9–1.1)
IRON SATN MFR SERPL: 27 % (ref 20–50)
IRON SERPL-MCNC: 55 UG/DL (ref 35–150)
LDH SERPL L TO P-CCNC: 184 U/L (ref 81–246)
MAGNESIUM SERPL-MCNC: 1 MG/DL (ref 1.6–2.4)
MCH RBC QN AUTO: 30 PG (ref 26–34)
MCHC RBC AUTO-ENTMCNC: 30.6 G/DL (ref 30–36.5)
MCV RBC AUTO: 98 FL (ref 80–99)
NRBC # BLD: 0 K/UL (ref 0–0.01)
NRBC BLD-RTO: 0 PER 100 WBC
P-R INTERVAL, ECG05: 178 MS
PLATELET # BLD AUTO: 172 K/UL (ref 150–400)
PMV BLD AUTO: 10.8 FL (ref 8.9–12.9)
POTASSIUM SERPL-SCNC: 3.8 MMOL/L (ref 3.5–5.1)
PROTHROMBIN TIME: 38.2 SEC (ref 9–11.1)
Q-T INTERVAL, ECG07: 394 MS
QRS DURATION, ECG06: 88 MS
QTC CALCULATION (BEZET), ECG08: 449 MS
RBC # BLD AUTO: 2.47 M/UL (ref 3.8–5.2)
RETICS # AUTO: 0.05 M/UL (ref 0.02–0.08)
RETICS/RBC NFR AUTO: 2.1 % (ref 0.7–2.1)
SERVICE CMNT-IMP: ABNORMAL
SODIUM SERPL-SCNC: 142 MMOL/L (ref 136–145)
TIBC SERPL-MCNC: 201 UG/DL (ref 250–450)
TROPONIN I SERPL-MCNC: <0.04 NG/ML
VENTRICULAR RATE, ECG03: 78 BPM
WBC # BLD AUTO: 8.3 K/UL (ref 3.6–11)

## 2018-05-31 PROCEDURE — 74011636637 HC RX REV CODE- 636/637: Performed by: INTERNAL MEDICINE

## 2018-05-31 PROCEDURE — 83010 ASSAY OF HAPTOGLOBIN QUANT: CPT | Performed by: INTERNAL MEDICINE

## 2018-05-31 PROCEDURE — 85018 HEMOGLOBIN: CPT | Performed by: INTERNAL MEDICINE

## 2018-05-31 PROCEDURE — 74011250637 HC RX REV CODE- 250/637: Performed by: INTERNAL MEDICINE

## 2018-05-31 PROCEDURE — 84484 ASSAY OF TROPONIN QUANT: CPT | Performed by: INTERNAL MEDICINE

## 2018-05-31 PROCEDURE — 36415 COLL VENOUS BLD VENIPUNCTURE: CPT | Performed by: INTERNAL MEDICINE

## 2018-05-31 PROCEDURE — 99218 HC RM OBSERVATION: CPT

## 2018-05-31 PROCEDURE — 82728 ASSAY OF FERRITIN: CPT | Performed by: INTERNAL MEDICINE

## 2018-05-31 PROCEDURE — 77010033678 HC OXYGEN DAILY

## 2018-05-31 PROCEDURE — 74011250637 HC RX REV CODE- 250/637: Performed by: NURSE PRACTITIONER

## 2018-05-31 PROCEDURE — 85045 AUTOMATED RETICULOCYTE COUNT: CPT | Performed by: INTERNAL MEDICINE

## 2018-05-31 PROCEDURE — 85027 COMPLETE CBC AUTOMATED: CPT | Performed by: INTERNAL MEDICINE

## 2018-05-31 PROCEDURE — 83735 ASSAY OF MAGNESIUM: CPT | Performed by: INTERNAL MEDICINE

## 2018-05-31 PROCEDURE — 85610 PROTHROMBIN TIME: CPT | Performed by: INTERNAL MEDICINE

## 2018-05-31 PROCEDURE — 82962 GLUCOSE BLOOD TEST: CPT

## 2018-05-31 PROCEDURE — 83615 LACTATE (LD) (LDH) ENZYME: CPT | Performed by: INTERNAL MEDICINE

## 2018-05-31 PROCEDURE — 80048 BASIC METABOLIC PNL TOTAL CA: CPT | Performed by: INTERNAL MEDICINE

## 2018-05-31 PROCEDURE — 83540 ASSAY OF IRON: CPT | Performed by: INTERNAL MEDICINE

## 2018-05-31 RX ORDER — NITROGLYCERIN 0.4 MG/1
0.4 TABLET SUBLINGUAL AS NEEDED
Status: DISCONTINUED | OUTPATIENT
Start: 2018-05-31 | End: 2018-06-15 | Stop reason: HOSPADM

## 2018-05-31 RX ORDER — PHYTONADIONE 5 MG/1
5 TABLET ORAL ONCE
Status: DISCONTINUED | OUTPATIENT
Start: 2018-05-31 | End: 2018-05-31

## 2018-05-31 RX ORDER — PHYTONADIONE 5 MG/1
2.5 TABLET ORAL
Status: DISCONTINUED | OUTPATIENT
Start: 2018-05-31 | End: 2018-05-31

## 2018-05-31 RX ORDER — AMLODIPINE BESYLATE 5 MG/1
10 TABLET ORAL DAILY
Status: DISCONTINUED | OUTPATIENT
Start: 2018-05-31 | End: 2018-06-15 | Stop reason: HOSPADM

## 2018-05-31 RX ORDER — OXYCODONE AND ACETAMINOPHEN 7.5; 325 MG/1; MG/1
1 TABLET ORAL
Status: DISCONTINUED | OUTPATIENT
Start: 2018-05-31 | End: 2018-06-15 | Stop reason: HOSPADM

## 2018-05-31 RX ADMIN — CARVEDILOL 25 MG: 12.5 TABLET, FILM COATED ORAL at 17:15

## 2018-05-31 RX ADMIN — PHYTONADIONE 5 MG: 10 INJECTION, EMULSION INTRAMUSCULAR; INTRAVENOUS; SUBCUTANEOUS at 17:17

## 2018-05-31 RX ADMIN — NITROGLYCERIN 0.4 MG: 0.4 TABLET SUBLINGUAL at 10:58

## 2018-05-31 RX ADMIN — ALLOPURINOL 100 MG: 100 TABLET ORAL at 08:56

## 2018-05-31 RX ADMIN — CARVEDILOL 25 MG: 12.5 TABLET, FILM COATED ORAL at 08:55

## 2018-05-31 RX ADMIN — BUMETANIDE 2 MG: 1 TABLET ORAL at 17:16

## 2018-05-31 RX ADMIN — OXYCODONE HYDROCHLORIDE AND ACETAMINOPHEN 1 TABLET: 7.5; 325 TABLET ORAL at 19:07

## 2018-05-31 RX ADMIN — NITROGLYCERIN 0.4 MG: 0.4 TABLET SUBLINGUAL at 20:34

## 2018-05-31 RX ADMIN — INSULIN LISPRO 3 UNITS: 100 INJECTION, SOLUTION INTRAVENOUS; SUBCUTANEOUS at 11:47

## 2018-05-31 RX ADMIN — ASPIRIN 81 MG: 81 TABLET, COATED ORAL at 08:55

## 2018-05-31 RX ADMIN — NITROGLYCERIN 0.4 MG: 0.4 TABLET SUBLINGUAL at 10:53

## 2018-05-31 RX ADMIN — NITROGLYCERIN 0.4 MG: 0.4 TABLET SUBLINGUAL at 20:01

## 2018-05-31 RX ADMIN — AMLODIPINE BESYLATE 10 MG: 5 TABLET ORAL at 15:27

## 2018-05-31 RX ADMIN — ISOSORBIDE MONONITRATE 120 MG: 60 TABLET ORAL at 08:54

## 2018-05-31 RX ADMIN — Medication 10 ML: at 22:46

## 2018-05-31 RX ADMIN — NITROGLYCERIN 0.4 MG: 0.4 TABLET SUBLINGUAL at 00:43

## 2018-05-31 RX ADMIN — BUMETANIDE 2 MG: 1 TABLET ORAL at 08:55

## 2018-05-31 RX ADMIN — ATORVASTATIN CALCIUM 80 MG: 20 TABLET, FILM COATED ORAL at 22:46

## 2018-05-31 RX ADMIN — OXYCODONE HYDROCHLORIDE AND ACETAMINOPHEN 1 TABLET: 7.5; 325 TABLET ORAL at 02:54

## 2018-05-31 NOTE — PROGRESS NOTES
Blake Zuniga MD [facility] [address]   Office [phone]  Mobile [phone]            NAME:  Max Savage   :   1955   MRN:   [id number]     Assessment/Plan:   1. Chronic recurrent anginal chest pain, exertional and nonexertional. Normal troponin  2. CAD  3. Chronic HPpEF, class 3 s/p CardioMEMS for PA monitoring  4. CKD  5. Chronic severe anemia, refractory to Procrit  6. Chronic anticoagulation, supratherapeutic INR  7. Morbid obesity  8. T2DM     Very complex case. Ideally needs left heart cath to reevaluate cors and right heart cath to recalibrate CardioMEMS. Hesistant to proceed until INR < 2 AND Hgb higher. Hematology to see today. Also need nephrology input. Continue   Subjective:   Cardiac ROS: hx reviewed. Chronic recurrent chest pains, nitrate responsive, rest and exertional. Has chronic class 3 LARA on oxygen. Intermittent HAs. Feels weak and fatigued. Patient denies anypalpitations, syncope, orthopnea, or paroxysmal nocturnal dyspnea. Review of Systems: No  indigestion, vomiting, , cough, sputum. No bleeding. Taking po. Appetite fair. No GI/ bleeding      Objective:     Visit Vitals    /75 (BP 1 Location: Right arm, BP Patient Position: At rest;Supine)    Pulse (!) 56    Temp 96.9 °F (36.1 °C)    Resp 18    Wt 328 lb 7.8 oz (149 kg)    SpO2 99%    BMI 47.13 kg/m2    O2 Flow Rate (L/min): 2 l/min O2 Device: Nasal cannula    Temp (24hrs), Av.7 °F (36.5 °C), Min:96.9 °F (36.1 °C), Max:98 °F (36.7 °C)            190 -  0700  In: 540 [P.O.:540]  Out: -     TELE: sinus    General: AAOx3 cooperative, no acute distress. Morbidly obese BF  HEENT: Atraumatic. Pink and moist.  Anicteric sclerae. +pallor  Neck : Supple, no thyromegaly. Lungs: CTA bilaterally. No wheezing/rhonchi/rales. Heart: Regular rhythm, no murmur, no rubs, no gallops. No JVD. No carotid bruits. Abdomen: Soft, non-distended, non-tender.  + Bowel sounds. No bruits. Extremities: No edema, no clubbing, no cyanosis. No calf tenderness  Neurologic: Grossly intact. Alert and oriented X 3. No acute neurological distress. Psych: Good insight. Not anxious nor agitated. Additional comments: None    Care Plan discussed with:    Comments   Patient x    Family  x    RN     Care Manager                    Consultant:  x        Data Review:     EKG    Echo    No results for input(s): TNIPOC in the last 72 hours.     No lab exists for component: ITNL   Recent Labs      05/31/18   0151 05/30/18   1530   TROIQ  <0.04  <0.04     Recent Labs      05/31/18   0151  05/30/18   1530   NA  142  144   K  3.8  3.8   CL  107  108   CO2  24  23   BUN  56*  51*   CREA  3.61*  3.58*   GLU  160*  196*   MG  1.0*   --    WBC  8.3  8.6   HGB  7.4*  7.8*   HCT  24.2*  25.3*   PLT  172  172     Recent Labs      05/31/18   0151 05/30/18   1530   INR  3.8*  3.3*   PTP  38.2*  34.1*       Medications reviewed  Current Facility-Administered Medications   Medication Dose Route Frequency    nitroglycerin (NITROSTAT) tablet 0.4 mg  0.4 mg SubLINGual PRN    oxyCODONE-acetaminophen (PERCOCET 7.5) 7.5-325 mg per tablet 1 Tab  1 Tab Oral Q6H PRN    WARFARIN INFORMATION NOTE (COUMADIN)   Other Rx Dosing/Monitoring    albuterol (PROVENTIL VENTOLIN) nebulizer solution 2.5 mg  2.5 mg Nebulization Q6H PRN    allopurinol (ZYLOPRIM) tablet 100 mg  100 mg Oral DAILY    aspirin delayed-release tablet 81 mg  81 mg Oral DAILY    atorvastatin (LIPITOR) tablet 80 mg  80 mg Oral QHS    bumetanide (BUMEX) tablet 2 mg  2 mg Oral BID    [START ON 6/1/2018] calcitRIOL (ROCALTROL) capsule 0.25 mcg  0.25 mcg Oral Q M, W, F & SAT    carvedilol (COREG) tablet 25 mg  25 mg Oral BID WITH MEALS    [START ON 6/5/2018] ergocalciferol (ERGOCALCIFEROL) capsule 50,000 Units  50,000 Units Oral every Tuesday    fluticasone (FLONASE) 50 mcg/actuation nasal spray 2 Spray  2 Spray Both Nostrils DAILY PRN    hydrOXYzine HCl (ATARAX) tablet 25 mg  25 mg Oral TID PRN    isosorbide mononitrate ER (IMDUR) tablet 120 mg  120 mg Oral DAILY    pantoprazole (PROTONIX) tablet 40 mg  40 mg Oral ACB    polyethylene glycol (MIRALAX) packet 17 g  17 g Oral DAILY    senna-docusate (PERICOLACE) 8.6-50 mg per tablet 1 Tab  1 Tab Oral DAILY    sodium chloride (NS) flush 5-10 mL  5-10 mL IntraVENous Q8H    sodium chloride (NS) flush 5-10 mL  5-10 mL IntraVENous PRN    naloxone (NARCAN) injection 0.4 mg  0.4 mg IntraVENous PRN    glucose chewable tablet 16 g  4 Tab Oral PRN    dextrose (D50W) injection syrg 12.5-25 g  12.5-25 g IntraVENous PRN    glucagon (GLUCAGEN) injection 1 mg  1 mg IntraMUSCular PRN    acetaminophen (TYLENOL) tablet 650 mg  650 mg Oral Q4H PRN    diphenhydrAMINE (BENADRYL) capsule 25 mg  25 mg Oral Q4H PRN    ondansetron (ZOFRAN) injection 4 mg  4 mg IntraVENous Q4H PRN    insulin lispro (HUMALOG) injection   SubCUTAneous AC&HS         Unknown MD Alan

## 2018-05-31 NOTE — PROGRESS NOTES
Problem: Falls - Risk of  Goal: *Absence of Falls  Document Sarah Fall Risk and appropriate interventions in the flowsheet.    Outcome: Progressing Towards Goal  Fall Risk Interventions:            Medication Interventions: Teach patient to arise slowly

## 2018-05-31 NOTE — NURSE NAVIGATOR
Chart reviewed by Heart Failure Nurse Navigator. Heart Failure database completed. EF:  Last Echo with EF 60% in December 2017. ACEi/ARB: not indicated. BB: carvedilol 25 mg BID. Aldosterone Antagonist: not indicated. CRT not indicated. NYHA Functional Class III. Heart Failure Teach Back in Patient Education. Heart Failure Avoiding Triggers on Discharge Instructions. Cardiologist: Dr Maria Luz Phillips    Met with patient at bedside. Reintroduced self and role of HF NN. Patient known to me from monitoring Cardiomems. Cardiomems reading done at bedside. Patient had stopped doing readings from home since she was awaiting recalibration.   Waveform today as below:

## 2018-05-31 NOTE — ROUTINE PROCESS
Bedside and Verbal shift change report given to Lenard Hartley RN (oncoming nurse) by Rajni RN (offgoing nurse). Report included the following information SBAR, Kardex, Intake/Output, Recent Results and Cardiac Rhythm NSR 1st degree AVB.

## 2018-05-31 NOTE — CONSULTS
[address] [facility]  [phone]    Hematology / Oncology Consult    Reason for Visit:   Rissa Sykes is a 63 y.o. female who is seen in consultation at the request of Dr. Mitul Sanchez for evaluation of anemia. History of Present Illness:   Ms. Uzma Gonsales is a 62 y/o female with DMII, CAD, Stage IV CKD admitted with chest pain, found to have severe anemia. Patient states she has had worsening anemia for several months to years. Last years, she states she received Procrit intermittently, but was in and out of the hospital quite a bit. Her last blood transfusion was in 2017. She was given parenteral iron infusions x 2 in Dec 2017, and then she was started on Procrit in 2018. Since then, she has been receiving the Procrit regularly every 2 weeks. However, her anemia has not responded and patient states the dose of Procrit was even increased. This has been managed by her Nephrologist, Dr. Raad Ha. She was in the infusion room when she had mentioned recurrent daily CP requiring use of nitroglycerin. She was therefore referred to the ER for further evaluation. Hgb was 7.8 on admission and is 7.5 today. Cardiology and Nephrology have evaluated patient. Cardiology feels her anginal symptoms may be exacerbated by the severe anemia. She denies melena/hematochezia, has undergone prior GI workup which was negative. She used to see Dr. Tanja Shvaer in  for elevated free light chains, but no evidence of myeloma. Her Hgb at that time was 9.8. She states she still has the CP and SOB. She also reports having COPD and interstitial lung disease, on home O2 for the past several years. Of note, she reports having a history of childhood leukemia while she was in the 2nd grade. She states she was treated in [place] with chemotherapy. She does not have many other details as her mother is . She also reports a h/o sickle cell trait and G6PD.  She has h/o RLE DVT in 2017 and is on Warfarin for this. Her INR is 3.8.     Past Medical History:   Diagnosis Date     Sleep Apnea 2/16/2011    Aortic aneurysm (HCC)     CAD (coronary Artery Disease) Native Artery 2/16/2011    CKD (chronic kidney disease) stage 4, GFR 15-29 ml/min ([address] 2/10/2017    COPD (chronic obstructive pulmonary disease) (HCC)     Diabetic gastroparesis (HCC)     Diastolic heart failure ([address] 10/5/2012    DM type 2 causing neurological disease ([address]     DM type 2 causing renal disease ([address]     DM type 2 causing vascular disease ([address]     Esophageal stricture 2012    dialted Dr. Cooper Smoker G6PD deficiency ([address]     trait    GERD (gastroesophageal reflux disease)     Gout     ILD (interstitial lung disease) ([address]     open lung bx CJW 2010    Morbid obesity ([address]     OA (osteoarthritis)     Ovarian cancer ([address]     cervical and uterine    Rheumatoid arteritis     S/P left pulmonary artery pressure sensor implant placement 8/31/2017    Cardiomems     Tobacco use disorder 3/21/2012    Uterine cervix cancer ([address]     Vitamin D deficiency 8/9/2013      Past Surgical History:   Procedure Laterality Date    CARDIAC SURG PROCEDURE UNLIST      stents    COLONOSCOPY N/A 6/24/2016    COLONOSCOPY performed by Yue Lopez MD at [facility] HX APPENDECTOMY      HX CARPAL TUNNEL RELEASE      bilateral    HX CHOLECYSTECTOMY      HX HERNIA REPAIR      HX HYSTERECTOMY      HX ORTHOPAEDIC  11/12/12    right knee replacement    HX TONSIL AND ADENOIDECTOMY      UPPER GI ENDOSCOPY,VINOD W ALBERTA  6/24/2016           Social History   Substance Use Topics    Smoking status: Former Smoker     Packs/day: 0.50     Years: 41.00     Types: Cigarettes     Quit date: 11/9/2014    Smokeless tobacco: Never Used    Alcohol use No      Family History   Problem Relation Age of Onset    Heart Disease Mother     Heart Disease Brother      Current Facility-Administered Medications   Medication Dose Route Frequency    nitroglycerin (NITROSTAT) tablet 0.4 mg  0.4 mg SubLINGual PRN    oxyCODONE-acetaminophen (PERCOCET 7.5) 7.5-325 mg per tablet 1 Tab  1 Tab Oral Q6H PRN    Warfarin: pharmacy to dose    Other Rx Dosing/Monitoring    Warfarin: Hold warfarin today due to INR of 3.8   Other ONCE    albuterol (PROVENTIL VENTOLIN) nebulizer solution 2.5 mg  2.5 mg Nebulization Q6H PRN    allopurinol (ZYLOPRIM) tablet 100 mg  100 mg Oral DAILY    aspirin delayed-release tablet 81 mg  81 mg Oral DAILY    atorvastatin (LIPITOR) tablet 80 mg  80 mg Oral QHS    bumetanide (BUMEX) tablet 2 mg  2 mg Oral BID    [START ON 6/1/2018] calcitRIOL (ROCALTROL) capsule 0.25 mcg  0.25 mcg Oral Q M, W, F & SAT    carvedilol (COREG) tablet 25 mg  25 mg Oral BID WITH MEALS    [START ON 6/5/2018] ergocalciferol (ERGOCALCIFEROL) capsule 50,000 Units  50,000 Units Oral every Tuesday    fluticasone (FLONASE) 50 mcg/actuation nasal spray 2 Spray  2 Spray Both Nostrils DAILY PRN    hydrOXYzine HCl (ATARAX) tablet 25 mg  25 mg Oral TID PRN    isosorbide mononitrate ER (IMDUR) tablet 120 mg  120 mg Oral DAILY    pantoprazole (PROTONIX) tablet 40 mg  40 mg Oral ACB    polyethylene glycol (MIRALAX) packet 17 g  17 g Oral DAILY    senna-docusate (PERICOLACE) 8.6-50 mg per tablet 1 Tab  1 Tab Oral DAILY    sodium chloride (NS) flush 5-10 mL  5-10 mL IntraVENous Q8H    sodium chloride (NS) flush 5-10 mL  5-10 mL IntraVENous PRN    naloxone (NARCAN) injection 0.4 mg  0.4 mg IntraVENous PRN    glucose chewable tablet 16 g  4 Tab Oral PRN    dextrose (D50W) injection syrg 12.5-25 g  12.5-25 g IntraVENous PRN    glucagon (GLUCAGEN) injection 1 mg  1 mg IntraMUSCular PRN    acetaminophen (TYLENOL) tablet 650 mg  650 mg Oral Q4H PRN    diphenhydrAMINE (BENADRYL) capsule 25 mg  25 mg Oral Q4H PRN    ondansetron (ZOFRAN) injection 4 mg  4 mg IntraVENous Q4H PRN    insulin lispro (HUMALOG) injection   SubCUTAneous AC&HS      Allergies Allergen Reactions    Contrast Dye [Iodine] Anaphylaxis    Levaquin [Levofloxacin] Nausea and Vomiting    Morphine Hives and Itching        Review of Systems: A complete review of systems was obtained, negative except as described above. Physical Exam:     Visit Vitals    /73    Pulse 70    Temp 98.3 °F (36.8 °C)    Resp 24    Wt 328 lb 7.8 oz (149 kg)    SpO2 96%    BMI 47.13 kg/m2     ECOG PS: 2-3  General: Well developed, no acute distress, obese  Eyes: PERRLA, EOMI, anicteric sclerae  HENT: Atraumatic, OP clear, TMs intact without erythema  Neck: Supple  Lymphatic: No cervical, supraclavicular, axillary or inguinal adenopathy  Respiratory: CTAB, normal respiratory effort, On supplemental O2  CV: Normal rate, regular rhythm, no murmurs, no peripheral edema  GI: Soft, nontender, nondistended, no masses. Unable to appreciate HSM due to body habitus. MS: Normal gait and station. Digits without clubbing or cyanosis. Skin: No rashes, ecchymoses, or petechiae. Normal temperature, turgor, and texture. Neuro/Psych: Alert, oriented. 5/5 strength in all 4 extremities. Appropriate affect, normal judgment/insight. Results:     Lab Results   Component Value Date/Time    WBC 8.3 05/31/2018 01:51 AM    HGB 7.5 (L) 05/31/2018 10:55 AM    HCT 24.0 (L) 05/31/2018 10:55 AM    PLATELET 237 72/41/7921 01:51 AM    MCV 98.0 05/31/2018 01:51 AM    ABS.  NEUTROPHILS 5.5 05/30/2018 03:30 PM    Hemoglobin (POC) 9.9 (L) 07/21/2013 09:51 PM    Hematocrit (POC) 29 (L) 07/21/2013 09:51 PM     Lab Results   Component Value Date/Time    Sodium 142 05/31/2018 01:51 AM    Potassium 3.8 05/31/2018 01:51 AM    Chloride 107 05/31/2018 01:51 AM    CO2 24 05/31/2018 01:51 AM    Glucose 160 (H) 05/31/2018 01:51 AM    BUN 56 (H) 05/31/2018 01:51 AM    Creatinine 3.61 (H) 05/31/2018 01:51 AM    GFR est AA 16 (L) 05/31/2018 01:51 AM    GFR est non-AA 13 (L) 05/31/2018 01:51 AM    Calcium 8.4 (L) 05/31/2018 01:51 AM    Sodium (POC) 140 07/21/2013 09:51 PM    Potassium (POC) 3.9 07/21/2013 09:51 PM    Chloride (POC) 109 (H) 07/21/2013 09:51 PM    Glucose (POC) 173 (H) 05/31/2018 11:34 AM    BUN (POC) 30 (H) 07/21/2013 09:51 PM    Creatinine (POC) 2.1 (H) 07/21/2013 09:51 PM    Calcium, ionized (POC) 1.19 07/21/2013 09:51 PM     Lab Results   Component Value Date/Time    Bilirubin, total 0.5 11/21/2017 12:40 PM    ALT (SGPT) 19 11/21/2017 12:40 PM    AST (SGOT) 24 11/21/2017 12:40 PM    Alk. phosphatase 94 11/21/2017 12:40 PM    Protein, total 6.8 11/21/2017 12:40 PM    Albumin 3.2 (L) 11/21/2017 12:40 PM    Globulin 3.6 11/21/2017 12:40 PM     Lab Results   Component Value Date/Time    Iron 37 11/21/2017 04:06 PM    TIBC 246 (L) 11/21/2017 04:06 PM    Iron % saturation 15 (L) 11/21/2017 04:06 PM    Ferritin 339 (H) 03/04/2017 05:02 AM           Imaging:     Radiology report(s) reviewed. .    Assessment & Plan:   Kirill Clifford is a 63 y.o. female with CAD/CHF, CKD admitted with CP and severe chronic anemia. 1. Normocytic anemia: Anemia of chronic renal disease, but not responding to EPO. I am unsure if she is receive Erythropoietin or Darbepoietin. When one agent shows no response, it may be worth switching to the alternate form. However, given lack of response to EPO thus far as well as multiple other complicating factors including CAD/angina, I recommend further evaluation with bone marrow biopsy in order to rule out other pathology. Some patients can develop aplastic anemia as an adverse effect of EPO. No evidence of hemolysis. Retic is inappropriately low. -- Recheck iron profile and replete with parenteral iron as needed  -- If no evidence of iron deficiency, I recommend pursuing bone marrow biopsy for further evaluation. -- I feel transfusing 1 Unit of PRBCs may be helpful with reducing her cardiac symptoms. 2. Stage IV CKD: Followed by Dr. Dawson Cannon as outpatient. Has AVF in place.     3. CAD/diastolic CHF: On Coreg, ASA, Imdur, Metolazone, Lipitor, Bumex. 4. Chest pain / SOB: Seems consistent with chronic angina. Currently with negative troponin, but cardiology would like to perform cardiac cath once INR improved. 5. COPD, Interstitial lung disease: On supplemental home oxygen @ 2L/min      6. DMII: Managed by PCP and currently stable. 7. History of RLE DVT: On Warfarin. Holding currently in preparation for procedures. -- Holding Warfarin and giving Vit K 5mg PO now. -- Recheck INR in am.      I appreciate the opportunity to participate in Ms. Karli gomez.     Signed By: Aury Epstein MD     May 31, 2018

## 2018-05-31 NOTE — PROGRESS NOTES
[facility]: MED RECONCILIATION    Comments/Recommendations:   · Verified allergies as documented. · Med rec completed with Ms. Gonsales who was a very good historian and matched against fill history  · She took medications today, including warfarin. She reports that last INR check was 2 weeks ago and it was 3.1, and she was told to hold warfarin for few days. No missed or double doses in past week. · She reports she has not needed to use insulin in months since her BG has been < 140 mg/dL. She denies any hypoglycemic events. She reports that she does not check her BG daily however. Medications added:     · None    Medications removed:    · Zyrtec  · Ferrous gluconate  · Hydroxyzine  · Metolazone  · Zofran PRN  · Miralax  · Pericolace    Medications adjusted:    · Percocet changed to q4h PRN since she has been needing to take it more frequently due to leg soreness and stomach pain  · Vitamin D 50,000 units adjusted to every Tuesday versus twice weekly (per patient, dose decreased)  · Warfarin adjusted to 2 mg on Mon, Fri and 3 mg on Sun, Tue, Wed, Thurs, Sat    Information obtained from: Patient, Rx Query, EMR    Allergies: Contrast dye [iodine]; Levaquin [levofloxacin]; and Morphine    Prior to Admission Medications:   Prior to Admission Medications   Prescriptions Last Dose Informant Patient Reported? Taking? albuterol (PROAIR HFA) 90 mcg/actuation inhaler  Self Yes Yes   Sig: Take 2 Puffs by inhalation every four (4) hours as needed for Wheezing. albuterol (PROVENTIL VENTOLIN) 2.5 mg /3 mL (0.083 %) nebulizer solution  Self Yes Yes   Si.5 mg by Nebulization route every four (4) hours as needed. allopurinol (ZYLOPRIM) 100 mg tablet 2018 at AM Self Yes Yes   Sig: Take 100 mg by mouth daily. amLODIPine (NORVASC) 10 mg tablet 2018 at AM Self Yes Yes   Sig: Take 10 mg by mouth daily. aspirin 81 mg tablet 2018 at AM Self Yes Yes   Sig: Take 81 mg by mouth daily.    atorvastatin (LIPITOR) 80 mg tablet 2018 at HS Self Yes Yes   Sig: Take 80 mg by mouth nightly. bumetanide (BUMEX) 1 mg tablet 2018 at AM Self Yes Yes   Sig: Take 2 mg by mouth two (2) times a day. calcitRIOL (ROCALTROL) 0.25 mcg capsule 2018 at AM Self Yes Yes   Sig: Take 0.25 mcg by mouth every Monday, Wednesday, Friday, Saturday. carvedilol (COREG) 25 mg tablet 2018 at AM Self No Yes   Sig: TAKE 1 TABLET BY MOUTH TWICE A DAY   ergocalciferol (VITAMIN D2) 50,000 unit capsule 2018 Self Yes Yes   Sig: Take 50,000 Units by mouth every Tuesday. fluticasone (FLONASE) 50 mcg/actuation nasal spray 2-3 weeks ago Self Yes Yes   Si Sprays by Both Nostrils route nightly as needed. insulin aspart protamine/insulin aspart (NOVOLOG MIX 70-30) 100 unit/mL (70-30) injection Not taking Self Yes No   Sig: by SubCUTAneous route three (3) times daily as needed (for BG  > 140 mg/dL). Uses sliding scale    isosorbide mononitrate ER (IMDUR) 120 mg CR tablet 2018 at AM Self No Yes   Sig: TAKE 1 TABLET BY MOUTH EVERY DAY   nitroglycerin (NITROSTAT) 0.3 mg SL tablet 2018 at HS Self No Yes   Si Tab by SubLINGual route every five (5) minutes as needed for Chest Pain. oxyCODONE-acetaminophen (PERCOCET) 7.5-325 mg per tablet 2018 at AM Self Yes Yes   Sig: Take 1 Tab by mouth every four (4) hours as needed for Pain.   pantoprazole (PROTONIX) 40 mg tablet 2 weeks ago Self No No   Sig: TAKE 1 TABLET BY MOUTH EVERY DAY FOR HEARTBURN   warfarin (COUMADIN) 2 mg tablet 2018 at AM Self Yes Yes   Sig: Take 2 mg by mouth every Monday and Friday. In addition to 1.5 tabs (3 mg) on Sun, TUe, Wed, Thurs, Sat   warfarin (COUMADIN) 2 mg tablet 2018 at AM Self Yes Yes   Sig: Take 3 mg by mouth five (5) days a week.  Take 1.5 tabs (3 mg) on Sun, Tue, Wed, Thurs, Sat and 1 tab (2 mg) on Mon and Fri      Facility-Administered Medications: None         Thank you,  Walt Sanchez, PharmD     Contact: [phone]

## 2018-05-31 NOTE — DIABETES MGMT
DTC Consult Note     POC Glucose last 24hrs:     Lab Results   Component Value Date/Time     (H) 05/31/2018 01:51 AM     (H) 05/30/2018 03:30 PM    GLUCPOC 173 (H) 05/31/2018 11:34 AM    GLUCPOC 126 (H) 05/31/2018 07:50 AM    GLUCPOC 152 (H) 05/30/2018 09:34 PM        Recommendations/ Comments: Kirill Clifford was agreeable to suggestions, engaged during interview. Was given an opportunity to ask questions. Verbalized understanding of suggestions. Consult received from Jacqueline Johnson MD  for  [x]    Assessment of home management  []    Meal planning  []    Meter / Monitoring  []    New Diagnosis  []    Insulin education  []    Insulin pump notification  []    Medication recommendations  []    Hospital glucose management  []    Michael Ren  []    Outpatient Education - Information forwarded to Jibo who will follow-up with pt 1 week after discharge     Hospital (inpatient) medications:  1. Correction Scale: Lispro (Humalog) Insulin Resistant Sensitivity scale to cover for glucose > 139 mg/dL before meals and for glucose >199 at bedtime      Assessment / Plan:     Chart reviewed and initial evaluation complete on Kirill Clifford is a 65yo AA female with a past medical history significant for  DM type 2, per Dr. Monica Clifford H&P dated 5/30/2018. She sees Dr. Leana Naylor MD [facility]) on an outpatient basis. Checks glucose 3-4 times a week with values ranging 110-140 - once in the 180's. Her next appointment will be in 1 month. Per patient home medications are  1.  Novolog Mix 70/30 - on a sliding scale after meals  For glucose <200 - has not used in 7 months               A1C:   Lab Results   Component Value Date/Time    Hemoglobin A1c 5.2 05/30/2018 03:30 PM    Hemoglobin A1c 5.8 11/22/2017 04:55 AM       Education  Assessed and instructed patient on the following interpretation of lab results, blood sugar goals, complications of diabetes mellitus, hypoglycemia prevention and treatment, referred to Diabetes Educator and use of sliding scale/correction formula, risk of infection/ delayed healing. Provided patient / family with the following:   [x]    Diabetes Self-Care Guide   [x]    Outpatient DTC contact number   []    Glucometer   []    Insulin Education Guide   []    Carbohydrate Counting      Dao Parra, MPH, RN, BSN, [address]   [phone] (office)  [phone] (pager)

## 2018-05-31 NOTE — PROGRESS NOTES
[address] [facility] Bishop Sands  YOB: 1955          Assessment & Plan:   CKD 4  · Stable   · At risk of ARF p contast and could need HD. Pt aware  · Has AVF which is ready for use    CP  · On oxygen  · Cath at some point per cards    HTN   · Reasonably controlled    SHPT   · Calcitriol    Anemia of CKD   · On EPO       Subjective:   CC: f/u CKD  HPI: Sent over from infusion ctr, where she was getting Procrit, due to CP. Still having some pain. Cardiology has seen and she needs a cath at some point, inpt vs outpt. HTN is controlled. Creat is stable at 3.8. She follows with Dr. Fidelia Allen for CKD 4 and has a L wrist AVF in place.    ROS: no n/v/sob  Current Facility-Administered Medications   Medication Dose Route Frequency    nitroglycerin (NITROSTAT) tablet 0.4 mg  0.4 mg SubLINGual PRN    oxyCODONE-acetaminophen (PERCOCET 7.5) 7.5-325 mg per tablet 1 Tab  1 Tab Oral Q6H PRN    Warfarin: pharmacy to dose    Other Rx Dosing/Monitoring    Warfarin: Hold warfarin today due to INR of 3.8   Other ONCE    albuterol (PROVENTIL VENTOLIN) nebulizer solution 2.5 mg  2.5 mg Nebulization Q6H PRN    allopurinol (ZYLOPRIM) tablet 100 mg  100 mg Oral DAILY    aspirin delayed-release tablet 81 mg  81 mg Oral DAILY    atorvastatin (LIPITOR) tablet 80 mg  80 mg Oral QHS    bumetanide (BUMEX) tablet 2 mg  2 mg Oral BID    [START ON 6/1/2018] calcitRIOL (ROCALTROL) capsule 0.25 mcg  0.25 mcg Oral Q M, W, F & SAT    carvedilol (COREG) tablet 25 mg  25 mg Oral BID WITH MEALS    [START ON 6/5/2018] ergocalciferol (ERGOCALCIFEROL) capsule 50,000 Units  50,000 Units Oral every Tuesday    fluticasone (FLONASE) 50 mcg/actuation nasal spray 2 Spray  2 Spray Both Nostrils DAILY PRN    hydrOXYzine HCl (ATARAX) tablet 25 mg  25 mg Oral TID PRN    isosorbide mononitrate ER (IMDUR) tablet 120 mg  120 mg Oral DAILY    pantoprazole (PROTONIX) tablet 40 mg  40 mg Oral ACB    polyethylene glycol (MIRALAX) packet 17 g  17 g Oral DAILY    senna-docusate (PERICOLACE) 8.6-50 mg per tablet 1 Tab  1 Tab Oral DAILY    sodium chloride (NS) flush 5-10 mL  5-10 mL IntraVENous Q8H    sodium chloride (NS) flush 5-10 mL  5-10 mL IntraVENous PRN    naloxone (NARCAN) injection 0.4 mg  0.4 mg IntraVENous PRN    glucose chewable tablet 16 g  4 Tab Oral PRN    dextrose (D50W) injection syrg 12.5-25 g  12.5-25 g IntraVENous PRN    glucagon (GLUCAGEN) injection 1 mg  1 mg IntraMUSCular PRN    acetaminophen (TYLENOL) tablet 650 mg  650 mg Oral Q4H PRN    diphenhydrAMINE (BENADRYL) capsule 25 mg  25 mg Oral Q4H PRN    ondansetron (ZOFRAN) injection 4 mg  4 mg IntraVENous Q4H PRN    insulin lispro (HUMALOG) injection   SubCUTAneous AC&HS          Objective:     Vitals:  Blood pressure 146/73, pulse 70, temperature 98.3 °F (36.8 °C), resp. rate 24, weight 149 kg (328 lb 7.8 oz), SpO2 96 %. Temp (24hrs), Av.9 °F (36.6 °C), Min:96.9 °F (36.1 °C), Max:98.4 °F (36.9 °C)      Intake and Output:   0701 - 1900  In: -   Out: 650 [Urine:650]  1901 -  0700  In: 540 [P.O.:540]  Out: -     Physical Exam:               GENERAL ASSESSMENT: NAD  CHEST: CTA  HEART: S1S2  ABDOMEN: Soft,NT  EXTREMITY: no EDEMA          Data Review      No results for input(s): TNIPOC in the last 72 hours.     No lab exists for component: ITNL   Recent Labs      18   0151  18   1530   TROIQ  <0.04  <0.04     Recent Labs      18   1055  18   0151  18   1530   NA   --   142  144   K   --   3.8  3.8   CL   --   107  108   CO2   --   24  23   BUN   --   56*  51*   CREA   --   3.61*  3.58*   GLU   --   160*  196*   MG   --   1.0*   --    CA   --   8.4*  8.7   WBC   --   8.3  8.6   HGB  7.5*  7.4*  7.8*   HCT  24.0*  24.2*  25.3*   PLT   --   172  172      Recent Labs      18   0151  18   1530   INR  3.8*  3.3*   PTP  38.2*  34.1*     Needs: urine analysis, urine sodium, protein and creatinine  Lab Results   Component Value Date/Time    Sodium urine, random 25 11/10/2014 12:40 PM    Creatinine, urine 145.29 03/04/2017 05:01 AM         : Kimo Strong MD  5/31/2018        [facility]:  [email or web address]  Magaly Olivo office:  [address], [address] [place] [address],[address]  [address]  Phone: [phone]  Fax :     [phone]    [place] office:  [address] [facility]  Dariusz Cardozamouth  Phone - [phone]  Fax - [phone]

## 2018-05-31 NOTE — PROGRESS NOTES
[facility] Dosing Services: 5/31/18    Consult for Warfarin Dosing by Pharmacy by Dr. Kat Black provided for this 63 y.o.  female , for indication of Venous Thrombosis. Day of Therapy:  2 mg on Mon, Fri and 3 mg (1.5 tabs) on Sun, Tue, Wed, Thurs, Sat   Dose to achieve an INR goal of 2-3    Order entered for warfarin to be held today due to INR of 3.8     Significant drug interactions: PTA allopurinol   Previous dose given 3mg taken at home 5/30    PT/INR Lab Results   Component Value Date/Time    INR 3.8 (H) 05/31/2018 01:51 AM      Platelets Lab Results   Component Value Date/Time    PLATELET 035 49/20/8042 01:51 AM      H/H Lab Results   Component Value Date/Time    HGB 7.4 (L) 05/31/2018 01:51 AM        Pharmacy to follow daily and will provide subsequent Warfarin dosing based on clinical status.   Alvarez Macias)  Contact information [phone]

## 2018-05-31 NOTE — PROGRESS NOTES
TRANSFER - IN REPORT:    Verbal report received from Thor Wheeler RN(name) on Jose M Reynoso  being received from ED(unit) for routine progression of care      Report consisted of patients Situation, Background, Assessment and   Recommendations(SBAR). Information from the following report(s) SBAR, Kardex, Procedure Summary, Intake/Output, MAR and Cardiac Rhythm NSR/ 1 AVB was reviewed with the receiving nurse. Opportunity for questions and clarification was provided. Assessment completed upon patients arrival to unit and care assumed. 0020:  Pt c/o chest pain and B LE pain. Chest feeling tight. Call made to Dr Nuria Calzada for sublingual nitro and increase freq of Percocet from Q8 to Q4. He gave order for Q6 and said any sooner would be too much Acetaminophen. Pt did not rest well all night as her pain was not well managed. Bedside and Verbal shift change report given to ROBERTO Hernandez RN (oncoming nurse) by Yana Galloway RN (offgoing nurse). Report included the following information SBAR, Kardex, ED Summary, Intake/Output, Accordion and Cardiac Rhythm NSR/ 1 AVB.

## 2018-05-31 NOTE — PROGRESS NOTES
[facility]  [address] [facility] [employer], [id number] [place] [address]  [phone]      Medical Progress Note      NAME:         Everett Anderson   :        1955  MRM:        [id number]    Date:          2018      Subjective: Patient has been seen and examined as a follow up for multiple medical issues. Chart, labs, diagnostics reviewed. She says she still has chest discomfort, mild to moderate and associated with exertional SOB. No fever or chills. She denies any melena stools or overt blood loss. Last transfused last November    Objective:    Vital Signs:    Visit Vitals    /73    Pulse 70    Temp 98.3 °F (36.8 °C)    Resp 24    Wt 149 kg (328 lb 7.8 oz)    SpO2 96%    BMI 47.13 kg/m2        Intake/Output Summary (Last 24 hours) at 18 1301  Last data filed at 18 0954   Gross per 24 hour   Intake              540 ml   Output              650 ml   Net             -110 ml        Physical Examination:    General:   Weak and ill looking female patient, not in any acute distress   Eyes:   pale conjunctivae, PERRLA with no discharge. ENT:   no ottorrhea or rhinorrhea with moist mucous membranes  Neck: no masses, thyroid non-tender and trachea central.  Pulm:  no accessory muscle use, decreased but clear breath sounds without crackles or wheezes  Card:  no JVD or murmurs, has regular and normal S1, S2 without thrills, bruits. + peripheral edema  Abd:  Soft, non-tender, obese, normoactive bowel sounds with no palpable organomegaly  Musc:  No cyanosis, clubbing, atrophy or deformities. Skin:  No rashes, bruising or ulcers. Neuro: Awake and alert.  Generally a non focal exam. Follows commands appropriately  Psych:  Has a fair insight and is oriented x 3    Current Facility-Administered Medications   Medication Dose Route Frequency    nitroglycerin (NITROSTAT) tablet 0.4 mg  0.4 mg SubLINGual PRN    oxyCODONE-acetaminophen (PERCOCET 7.5) 7.5-325 mg per tablet 1 Tab  1 Tab Oral Q6H PRN    Warfarin: pharmacy to dose    Other Rx Dosing/Monitoring    Warfarin: Hold warfarin today due to INR of 3.8   Other ONCE    albuterol (PROVENTIL VENTOLIN) nebulizer solution 2.5 mg  2.5 mg Nebulization Q6H PRN    allopurinol (ZYLOPRIM) tablet 100 mg  100 mg Oral DAILY    aspirin delayed-release tablet 81 mg  81 mg Oral DAILY    atorvastatin (LIPITOR) tablet 80 mg  80 mg Oral QHS    bumetanide (BUMEX) tablet 2 mg  2 mg Oral BID    [START ON 6/1/2018] calcitRIOL (ROCALTROL) capsule 0.25 mcg  0.25 mcg Oral Q M, W, F & SAT    carvedilol (COREG) tablet 25 mg  25 mg Oral BID WITH MEALS    [START ON 6/5/2018] ergocalciferol (ERGOCALCIFEROL) capsule 50,000 Units  50,000 Units Oral every Tuesday    fluticasone (FLONASE) 50 mcg/actuation nasal spray 2 Spray  2 Spray Both Nostrils DAILY PRN    hydrOXYzine HCl (ATARAX) tablet 25 mg  25 mg Oral TID PRN    isosorbide mononitrate ER (IMDUR) tablet 120 mg  120 mg Oral DAILY    pantoprazole (PROTONIX) tablet 40 mg  40 mg Oral ACB    polyethylene glycol (MIRALAX) packet 17 g  17 g Oral DAILY    senna-docusate (PERICOLACE) 8.6-50 mg per tablet 1 Tab  1 Tab Oral DAILY    sodium chloride (NS) flush 5-10 mL  5-10 mL IntraVENous Q8H    sodium chloride (NS) flush 5-10 mL  5-10 mL IntraVENous PRN    naloxone (NARCAN) injection 0.4 mg  0.4 mg IntraVENous PRN    glucose chewable tablet 16 g  4 Tab Oral PRN    dextrose (D50W) injection syrg 12.5-25 g  12.5-25 g IntraVENous PRN    glucagon (GLUCAGEN) injection 1 mg  1 mg IntraMUSCular PRN    acetaminophen (TYLENOL) tablet 650 mg  650 mg Oral Q4H PRN    diphenhydrAMINE (BENADRYL) capsule 25 mg  25 mg Oral Q4H PRN    ondansetron (ZOFRAN) injection 4 mg  4 mg IntraVENous Q4H PRN    insulin lispro (HUMALOG) injection   SubCUTAneous AC&HS        Laboratory data and review:    Recent Labs      05/31/18   1055  05/31/18   0154 05/30/18   1530   WBC   --   8.3  8.6   HGB  7.5*  7.4*  7.8*   HCT  24.0*  24.2*  25.3*   PLT   --   172  172     Recent Labs      05/31/18   0151  05/30/18   1530   NA  142  144   K  3.8  3.8   CL  107  108   CO2  24  23   GLU  160*  196*   BUN  56*  51*   CREA  3.61*  3.58*   CA  8.4*  8.7   MG  1.0*   --    INR  3.8*  3.3*     No components found for: [place]    Other Diagnostics:    Telemetry reviewed:   normal sinus rhythm    Assessment and Plan:    Chest pain / Dyspnea POA: she has chronic anginal symptoms. Neg troponin. She has been reviewed by cardiology who recommend a cardiac cath at some point. Continue Asprin, Lipitor, Coreg, Imdur for now    CAD (coronary Artery Disease) Native Artery / Chronic diastolic heart failure / HTN (hypertension): POA, Continue Bumex, Metolazone, Coreg, IMDUR, ASA, Lipitor      Morbid obesity / JASEN on CPAP: continue CPAP. Advised on weight loss already.     Chronic respiratory failure with hypoxia / COPD, severe / ILD (interstitial lung disease) / Pulmonary HTN POA: continue supplemental home oxygen @ 2L/min     Gastroparesis / Esophageal reflux: Continue PPI     Normocytic anemia POA: presumed to be from chronic kidney disease. She says she has had GI evaluations prior and this was unremarkable. Cardiology request a hematology consult. Will order this and continue PO iron.     DM (diabetes mellitus), type 2 with renal, vascular and neurological complications POA: Diabetic diet and counseling. SSI per protocol. Blood glucose stable.      CKD (chronic kidney disease) stage 4 POA: reviewed by nephrology. Stable for now. Continue Calcitriol. May need HD if she has contrast    Hx of deep venous thrombosis / Warfarin anticoagulation: continue Coumadin. Pharmacy to dose     Gout: stable. Continue Allopurinol.     Total time spent for the patient's care: 7930 Kemi discussed with: Patient, Care Manager and Nursing Staff    Discussed:  Care Plan and D/C Planning    Prophylaxis:  Coumadin    Anticipated Disposition:  Home w/Family           ___________________________________________________    Attending Physician:   Laurita Simms MD

## 2018-06-01 ENCOUNTER — APPOINTMENT (OUTPATIENT)
Dept: CT IMAGING | Age: 63
DRG: 193 | End: 2018-06-01
Attending: NURSE PRACTITIONER
Payer: MEDICARE

## 2018-06-01 ENCOUNTER — APPOINTMENT (OUTPATIENT)
Dept: CARDIAC CATH/INVASIVE PROCEDURES | Age: 63
DRG: 193 | End: 2018-06-01
Payer: MEDICARE

## 2018-06-01 LAB
ANION GAP SERPL CALC-SCNC: 12 MMOL/L (ref 5–15)
BASOPHILS # BLD: 0 K/UL (ref 0–0.1)
BASOPHILS NFR BLD: 0 % (ref 0–1)
BUN SERPL-MCNC: 57 MG/DL (ref 6–20)
BUN/CREAT SERPL: 16 (ref 12–20)
CALCIUM SERPL-MCNC: 8.3 MG/DL (ref 8.5–10.1)
CHLORIDE SERPL-SCNC: 107 MMOL/L (ref 97–108)
CO2 SERPL-SCNC: 24 MMOL/L (ref 21–32)
CREAT SERPL-MCNC: 3.63 MG/DL (ref 0.55–1.02)
DIFFERENTIAL METHOD BLD: ABNORMAL
EOSINOPHIL # BLD: 0.5 K/UL (ref 0–0.4)
EOSINOPHIL NFR BLD: 6 % (ref 0–7)
ERYTHROCYTE [DISTWIDTH] IN BLOOD BY AUTOMATED COUNT: 14.9 % (ref 11.5–14.5)
GLUCOSE BLD STRIP.AUTO-MCNC: 140 MG/DL (ref 65–100)
GLUCOSE BLD STRIP.AUTO-MCNC: 148 MG/DL (ref 65–100)
GLUCOSE BLD STRIP.AUTO-MCNC: 160 MG/DL (ref 65–100)
GLUCOSE BLD STRIP.AUTO-MCNC: 183 MG/DL (ref 65–100)
GLUCOSE SERPL-MCNC: 124 MG/DL (ref 65–100)
HCT VFR BLD AUTO: 23.6 % (ref 35–47)
HGB BLD-MCNC: 7.5 G/DL (ref 11.5–16)
IMM GRANULOCYTES # BLD: 0 K/UL (ref 0–0.04)
IMM GRANULOCYTES NFR BLD AUTO: 1 % (ref 0–0.5)
INR PPP: 2.6 (ref 0.9–1.1)
LYMPHOCYTES # BLD: 2.1 K/UL (ref 0.8–3.5)
LYMPHOCYTES NFR BLD: 27 % (ref 12–49)
MCH RBC QN AUTO: 31.3 PG (ref 26–34)
MCHC RBC AUTO-ENTMCNC: 31.8 G/DL (ref 30–36.5)
MCV RBC AUTO: 98.3 FL (ref 80–99)
MONOCYTES # BLD: 0.6 K/UL (ref 0–1)
MONOCYTES NFR BLD: 7 % (ref 5–13)
NEUTS SEG # BLD: 4.6 K/UL (ref 1.8–8)
NEUTS SEG NFR BLD: 59 % (ref 32–75)
NRBC # BLD: 0 K/UL (ref 0–0.01)
NRBC BLD-RTO: 0 PER 100 WBC
PLATELET # BLD AUTO: 176 K/UL (ref 150–400)
PMV BLD AUTO: 10.7 FL (ref 8.9–12.9)
POTASSIUM SERPL-SCNC: 3.8 MMOL/L (ref 3.5–5.1)
PROTHROMBIN TIME: 26.6 SEC (ref 9–11.1)
RBC # BLD AUTO: 2.4 M/UL (ref 3.8–5.2)
SERVICE CMNT-IMP: ABNORMAL
SODIUM SERPL-SCNC: 143 MMOL/L (ref 136–145)
WBC # BLD AUTO: 7.8 K/UL (ref 3.6–11)

## 2018-06-01 PROCEDURE — 36415 COLL VENOUS BLD VENIPUNCTURE: CPT | Performed by: INTERNAL MEDICINE

## 2018-06-01 PROCEDURE — 80048 BASIC METABOLIC PNL TOTAL CA: CPT | Performed by: INTERNAL MEDICINE

## 2018-06-01 PROCEDURE — 74011250637 HC RX REV CODE- 250/637: Performed by: INTERNAL MEDICINE

## 2018-06-01 PROCEDURE — 99153 MOD SED SAME PHYS/QHP EA: CPT

## 2018-06-01 PROCEDURE — 88305 TISSUE EXAM BY PATHOLOGIST: CPT | Performed by: RADIOLOGY

## 2018-06-01 PROCEDURE — 85610 PROTHROMBIN TIME: CPT | Performed by: INTERNAL MEDICINE

## 2018-06-01 PROCEDURE — P9059 PLASMA, FRZ BETWEEN 8-24HOUR: HCPCS | Performed by: NURSE PRACTITIONER

## 2018-06-01 PROCEDURE — 74011636637 HC RX REV CODE- 636/637: Performed by: INTERNAL MEDICINE

## 2018-06-01 PROCEDURE — 07DR3ZX EXTRACTION OF ILIAC BONE MARROW, PERCUTANEOUS APPROACH, DIAGNOSTIC: ICD-10-PCS | Performed by: RADIOLOGY

## 2018-06-01 PROCEDURE — 85025 COMPLETE CBC W/AUTO DIFF WBC: CPT | Performed by: INTERNAL MEDICINE

## 2018-06-01 PROCEDURE — 94660 CPAP INITIATION&MGMT: CPT

## 2018-06-01 PROCEDURE — 82962 GLUCOSE BLOOD TEST: CPT

## 2018-06-01 PROCEDURE — 88311 DECALCIFY TISSUE: CPT | Performed by: RADIOLOGY

## 2018-06-01 PROCEDURE — 30233N1 TRANSFUSION OF NONAUTOLOGOUS RED BLOOD CELLS INTO PERIPHERAL VEIN, PERCUTANEOUS APPROACH: ICD-10-PCS | Performed by: INTERNAL MEDICINE

## 2018-06-01 PROCEDURE — 88365 INSITU HYBRIDIZATION (FISH): CPT | Performed by: RADIOLOGY

## 2018-06-01 PROCEDURE — 74011250636 HC RX REV CODE- 250/636: Performed by: RADIOLOGY

## 2018-06-01 PROCEDURE — 74011000250 HC RX REV CODE- 250: Performed by: NURSE PRACTITIONER

## 2018-06-01 PROCEDURE — 85097 BONE MARROW INTERPRETATION: CPT | Performed by: RADIOLOGY

## 2018-06-01 PROCEDURE — 30233R1 TRANSFUSION OF NONAUTOLOGOUS PLATELETS INTO PERIPHERAL VEIN, PERCUTANEOUS APPROACH: ICD-10-PCS | Performed by: INTERNAL MEDICINE

## 2018-06-01 PROCEDURE — 88313 SPECIAL STAINS GROUP 2: CPT | Performed by: RADIOLOGY

## 2018-06-01 PROCEDURE — 74011000250 HC RX REV CODE- 250: Performed by: RADIOLOGY

## 2018-06-01 PROCEDURE — 77012 CT SCAN FOR NEEDLE BIOPSY: CPT

## 2018-06-01 PROCEDURE — 88342 IMHCHEM/IMCYTCHM 1ST ANTB: CPT | Performed by: RADIOLOGY

## 2018-06-01 PROCEDURE — 99218 HC RM OBSERVATION: CPT

## 2018-06-01 PROCEDURE — 77030028872 HC BN BIOP NDL ON CNTRL TY TELE -C

## 2018-06-01 PROCEDURE — P9016 RBC LEUKOCYTES REDUCED: HCPCS | Performed by: EMERGENCY MEDICINE

## 2018-06-01 PROCEDURE — 36430 TRANSFUSION BLD/BLD COMPNT: CPT

## 2018-06-01 PROCEDURE — 99152 MOD SED SAME PHYS/QHP 5/>YRS: CPT

## 2018-06-01 PROCEDURE — 65660000000 HC RM CCU STEPDOWN

## 2018-06-01 RX ORDER — LIDOCAINE HYDROCHLORIDE 10 MG/ML
10 INJECTION, SOLUTION EPIDURAL; INFILTRATION; INTRACAUDAL; PERINEURAL
Status: COMPLETED | OUTPATIENT
Start: 2018-06-01 | End: 2018-06-01

## 2018-06-01 RX ORDER — BUMETANIDE 1 MG/1
2 TABLET ORAL 2 TIMES DAILY
Status: DISCONTINUED | OUTPATIENT
Start: 2018-06-02 | End: 2018-06-04

## 2018-06-01 RX ORDER — FENTANYL CITRATE 50 UG/ML
100 INJECTION, SOLUTION INTRAMUSCULAR; INTRAVENOUS
Status: DISPENSED | OUTPATIENT
Start: 2018-06-01 | End: 2018-06-01

## 2018-06-01 RX ORDER — MIDAZOLAM HYDROCHLORIDE 1 MG/ML
5 INJECTION, SOLUTION INTRAMUSCULAR; INTRAVENOUS
Status: DISPENSED | OUTPATIENT
Start: 2018-06-01 | End: 2018-06-01

## 2018-06-01 RX ORDER — BUMETANIDE 0.25 MG/ML
2 INJECTION INTRAMUSCULAR; INTRAVENOUS ONCE
Status: COMPLETED | OUTPATIENT
Start: 2018-06-01 | End: 2018-06-01

## 2018-06-01 RX ORDER — SODIUM CHLORIDE 9 MG/ML
250 INJECTION, SOLUTION INTRAVENOUS AS NEEDED
Status: DISCONTINUED | OUTPATIENT
Start: 2018-06-01 | End: 2018-06-03 | Stop reason: SDUPTHER

## 2018-06-01 RX ADMIN — BUMETANIDE 2 MG: 0.25 INJECTION INTRAMUSCULAR; INTRAVENOUS at 17:16

## 2018-06-01 RX ADMIN — ATORVASTATIN CALCIUM 80 MG: 20 TABLET, FILM COATED ORAL at 21:17

## 2018-06-01 RX ADMIN — Medication 10 ML: at 21:18

## 2018-06-01 RX ADMIN — FENTANYL CITRATE 25 MCG: 50 INJECTION, SOLUTION INTRAMUSCULAR; INTRAVENOUS at 12:33

## 2018-06-01 RX ADMIN — STANDARDIZED SENNA CONCENTRATE AND DOCUSATE SODIUM 1 TABLET: 8.6; 5 TABLET, FILM COATED ORAL at 08:43

## 2018-06-01 RX ADMIN — CALCITRIOL 0.25 MCG: 0.25 CAPSULE, LIQUID FILLED ORAL at 08:42

## 2018-06-01 RX ADMIN — ALLOPURINOL 100 MG: 100 TABLET ORAL at 08:43

## 2018-06-01 RX ADMIN — CARVEDILOL 25 MG: 12.5 TABLET, FILM COATED ORAL at 08:43

## 2018-06-01 RX ADMIN — FENTANYL CITRATE 25 MCG: 50 INJECTION, SOLUTION INTRAMUSCULAR; INTRAVENOUS at 12:37

## 2018-06-01 RX ADMIN — CARVEDILOL 25 MG: 12.5 TABLET, FILM COATED ORAL at 17:16

## 2018-06-01 RX ADMIN — MIDAZOLAM 1 MG: 1 INJECTION INTRAMUSCULAR; INTRAVENOUS at 12:47

## 2018-06-01 RX ADMIN — ISOSORBIDE MONONITRATE 120 MG: 60 TABLET ORAL at 08:43

## 2018-06-01 RX ADMIN — INSULIN LISPRO 3 UNITS: 100 INJECTION, SOLUTION INTRAVENOUS; SUBCUTANEOUS at 16:53

## 2018-06-01 RX ADMIN — MIDAZOLAM 1 MG: 1 INJECTION INTRAMUSCULAR; INTRAVENOUS at 12:37

## 2018-06-01 RX ADMIN — MIDAZOLAM 1 MG: 1 INJECTION INTRAMUSCULAR; INTRAVENOUS at 12:33

## 2018-06-01 RX ADMIN — OXYCODONE HYDROCHLORIDE AND ACETAMINOPHEN 1 TABLET: 7.5; 325 TABLET ORAL at 17:39

## 2018-06-01 RX ADMIN — FENTANYL CITRATE 25 MCG: 50 INJECTION, SOLUTION INTRAMUSCULAR; INTRAVENOUS at 12:50

## 2018-06-01 RX ADMIN — NITROGLYCERIN 0.4 MG: 0.4 TABLET SUBLINGUAL at 11:05

## 2018-06-01 RX ADMIN — MIDAZOLAM 1 MG: 1 INJECTION INTRAMUSCULAR; INTRAVENOUS at 12:50

## 2018-06-01 RX ADMIN — LIDOCAINE HYDROCHLORIDE 10 ML: 10 INJECTION, SOLUTION EPIDURAL; INFILTRATION; INTRACAUDAL; PERINEURAL at 13:03

## 2018-06-01 RX ADMIN — FENTANYL CITRATE 25 MCG: 50 INJECTION, SOLUTION INTRAMUSCULAR; INTRAVENOUS at 12:47

## 2018-06-01 RX ADMIN — BUMETANIDE 2 MG: 1 TABLET ORAL at 08:42

## 2018-06-01 RX ADMIN — AMLODIPINE BESYLATE 10 MG: 5 TABLET ORAL at 08:43

## 2018-06-01 RX ADMIN — ASPIRIN 81 MG: 81 TABLET, COATED ORAL at 08:42

## 2018-06-01 RX ADMIN — NITROGLYCERIN 0.4 MG: 0.4 TABLET SUBLINGUAL at 10:57

## 2018-06-01 RX ADMIN — POLYETHYLENE GLYCOL (3350) 17 G: 17 POWDER, FOR SOLUTION ORAL at 08:43

## 2018-06-01 NOTE — PROGRESS NOTES
Bedside and Verbal shift change report given to [employer] [place] [employer], RN (oncoming nurse) by Junior Rox RN (offgoing nurse). Report included the following information SBAR, Kardex, Procedure Summary, Accordion, Recent Results and Cardiac Rhythm NSR/PVCs.

## 2018-06-01 NOTE — PROGRESS NOTES
I discussed pt with attending. Pt will be here @ least until Tuesday or maybe Wednesday depending upon the results of pt.'s cardiac catherization which is scheduled for Tuesday,6/5/18. Spouse is Kenisha Wilks. His number is [phone]. At home ,pt has home oxygen  ,nebulizer, glucometer, cane,walker, rollator and BSC. Case Management will follow-up with pt on Monday to reassess pt.     Thomas Zuniga

## 2018-06-01 NOTE — PHYSICIAN ADVISORY
Letter of Status Determination:   Recommend hospitalization status upgraded from   INPATIENT  to INPATIENT  Status     Pt Name:  Manju Watson   MR#   [address] [facility] # [id number] /  [id number]   [facility]#  [id number]   [address] [place] [address]  330/01  @ [facility]   Hospitalization date  5/30/2018  3:11 PM   Current Attending Physician  Wes Ness MD   Principal diagnosis  <principal problem not specified>   Chest pain    Clinicals  63 y.o. y.o  female hospitalized with above diagnosis   The pt is noted to have complex multiple morbidities. She is now receiving Rx for chest pain, CAD,, etc.   She is also receiving PRBC transfusion for her anemia. Due to appropriate and necessary medical care, this pt's hospitalization has now exceeded two midnights . [place]   Does  NOT apply    STATUS DETERMINATION  This patient is at above high risk of deterioration based on documented presenting clinical data, comorbid conditions, high risk of adverse events and current acute care course. Ms. Manju Watson now meets Inpatient Admission status criteria in accordance with CMS regulation Section 43 .3. Specifically, due to medical necessity the patient's stay now exceeds Two Midnights. It is our recommendation that this patient's hospitalization status should be upgraded from  INPATIENT to INPATIENT status.      The final decision of the patient's hospitalization status depends on the attending physician's judgment            Additional comments     Payor: Zeus Simms / Plan: [address] / Product Type: Medicare /         Lulu Waddell MD  [facility]   President Medical Staff, [address] [place] [address] [phone]        [phone]    .

## 2018-06-01 NOTE — PROGRESS NOTES
[address] [facility] Bishop Sands  YOB: 1955          Assessment & Plan:   CKD 4  · Stable : no new labs  · At risk of ARF p contast and could need HD. Pt aware  · Has AVF which is ready for use    CP  · On oxygen  · Cath at some point per cards: may be next week    HTN   · Reasonably controlled    SHPT   · Calcitriol    Anemia of CKD   · On EPO  · BMB planned today       Subjective:   CC: f/u CKD  HPI: CKD: no new labs  BMB planned today  DW FAMILY. Bumex for volume control.   ROS: no n/v/sob  Current Facility-Administered Medications   Medication Dose Route Frequency    nitroglycerin (NITROSTAT) tablet 0.4 mg  0.4 mg SubLINGual PRN    oxyCODONE-acetaminophen (PERCOCET 7.5) 7.5-325 mg per tablet 1 Tab  1 Tab Oral Q6H PRN    Warfarin: pharmacy to dose    Other Rx Dosing/Monitoring    amLODIPine (NORVASC) tablet 10 mg  10 mg Oral DAILY    albuterol (PROVENTIL VENTOLIN) nebulizer solution 2.5 mg  2.5 mg Nebulization Q6H PRN    allopurinol (ZYLOPRIM) tablet 100 mg  100 mg Oral DAILY    aspirin delayed-release tablet 81 mg  81 mg Oral DAILY    atorvastatin (LIPITOR) tablet 80 mg  80 mg Oral QHS    bumetanide (BUMEX) tablet 2 mg  2 mg Oral BID    calcitRIOL (ROCALTROL) capsule 0.25 mcg  0.25 mcg Oral Q M, W, F & SAT    carvedilol (COREG) tablet 25 mg  25 mg Oral BID WITH MEALS    [START ON 6/5/2018] ergocalciferol (ERGOCALCIFEROL) capsule 50,000 Units  50,000 Units Oral every Tuesday    fluticasone (FLONASE) 50 mcg/actuation nasal spray 2 Spray  2 Spray Both Nostrils DAILY PRN    hydrOXYzine HCl (ATARAX) tablet 25 mg  25 mg Oral TID PRN    isosorbide mononitrate ER (IMDUR) tablet 120 mg  120 mg Oral DAILY    pantoprazole (PROTONIX) tablet 40 mg  40 mg Oral ACB    polyethylene glycol (MIRALAX) packet 17 g  17 g Oral DAILY    senna-docusate (PERICOLACE) 8.6-50 mg per tablet 1 Tab  1 Tab Oral DAILY    sodium chloride (NS) flush 5-10 mL  5-10 mL IntraVENous Q8H    sodium chloride (NS) flush 5-10 mL  5-10 mL IntraVENous PRN    naloxone (NARCAN) injection 0.4 mg  0.4 mg IntraVENous PRN    glucose chewable tablet 16 g  4 Tab Oral PRN    dextrose (D50W) injection syrg 12.5-25 g  12.5-25 g IntraVENous PRN    glucagon (GLUCAGEN) injection 1 mg  1 mg IntraMUSCular PRN    acetaminophen (TYLENOL) tablet 650 mg  650 mg Oral Q4H PRN    diphenhydrAMINE (BENADRYL) capsule 25 mg  25 mg Oral Q4H PRN    ondansetron (ZOFRAN) injection 4 mg  4 mg IntraVENous Q4H PRN    insulin lispro (HUMALOG) injection   SubCUTAneous AC&HS          Objective:     Vitals:  Blood pressure 125/55, pulse 63, temperature 97.5 °F (36.4 °C), resp. rate 20, weight 149 kg (328 lb 7.8 oz), SpO2 95 %. Temp (24hrs), Av.9 °F (36.6 °C), Min:97.5 °F (36.4 °C), Max:98.4 °F (36.9 °C)      Intake and Output:      1901 -  0700  In: 1500 [P.O.:1500]  Out: 650 [Urine:650]    Physical Exam:               GENERAL ASSESSMENT: NAD  CHEST: CTA  HEART: S1S2  ABDOMEN: Soft,NT  EXTREMITY: no EDEMA          Data Review      No results for input(s): TNIPOC in the last 72 hours.     No lab exists for component: ITNL   Recent Labs      18   0151  18   1530   TROIQ  <0.04  <0.04     Recent Labs      18   1055  18   0151  18   1530   NA   --   142  144   K   --   3.8  3.8   CL   --   107  108   CO2   --   24  23   BUN   --   56*  51*   CREA   --   3.61*  3.58*   GLU   --   160*  196*   MG   --   1.0*   --    CA   --   8.4*  8.7   WBC   --   8.3  8.6   HGB  7.5*  7.4*  7.8*   HCT  24.0*  24.2*  25.3*   PLT   --   172  172      Recent Labs      18   0107  18   0151  18   1530   INR  2.6*  3.8*  3.3*   PTP  26.6*  38.2*  34.1*     Needs: urine analysis, urine sodium, protein and creatinine  Lab Results   Component Value Date/Time    Sodium urine, random 25 11/10/2014 12:40 PM    Creatinine, urine 145.29 2017 05:01 AM         : Trevin TREJO Na Brown MD  6/1/2018        [facility]:  [email or web address]  [place] office:  [address] [place] [address] PROVIDERS Counts include 234 beds at the [facility] [address]  [address] [place] [address]  Phone: [phone]  Fax :     [phone]    [place] office:  [address] [facility]  Dariusz Cardozamouth  Phone - [phone]  Fax - [phone]

## 2018-06-01 NOTE — PROGRESS NOTES
[address] [facility]  [phone]    Hematology / Oncology Consult    Reason for Visit:   Erika Godoy is a 63 y.o. female who is seen in consultation at the request of Dr. Carolynn Toribio for evaluation of anemia. History of Present Illness:   Ms. Eugene Gibbons is a 64 y/o female with DMII, CAD, Stage IV CKD admitted with chest pain, found to have severe anemia. Patient states she has had worsening anemia for several months to years. Last years, she states she received Procrit intermittently, but was in and out of the hospital quite a bit. Her last blood transfusion was in 2017. She was given parenteral iron infusions x 2 in Dec 2017, and then she was started on Procrit in 2018. Since then, she has been receiving the Procrit regularly every 2 weeks. However, her anemia has not responded and patient states the dose of Procrit was even increased. This has been managed by her Nephrologist, Dr. Sarah Cintron. She was in the infusion room when she had mentioned recurrent daily CP requiring use of nitroglycerin. She was therefore referred to the ER for further evaluation. Hgb was 7.8 on admission and is 7.5 today. Cardiology and Nephrology have evaluated patient. Cardiology feels her anginal symptoms may be exacerbated by the severe anemia. She denies melena/hematochezia, has undergone prior GI workup which was negative. She used to see Dr. Gordon Gonzalez in  for elevated free light chains, but no evidence of myeloma. Her Hgb at that time was 9.8. She states she still has the CP and SOB. She also reports having COPD and interstitial lung disease, on home O2 for the past several years. Of note, she reports having a history of childhood leukemia while she was in the 2nd grade. She states she was treated in [place] with chemotherapy. She does not have many other details as her mother is . She also reports a h/o sickle cell trait and G6PD.  She has h/o RLE DVT in 2017 and is on Warfarin for this. Her INR is 3.8. Interval History:     Continues with some SOB. Would like to proceed with BM bx in hopes of finding some answers. Blood consent obtained and all questions answered at this time.  at bedside.       Past Medical History:   Diagnosis Date     Sleep Apnea 2/16/2011    Aortic aneurysm (HCC)     CAD (coronary Artery Disease) Native Artery 2/16/2011    CKD (chronic kidney disease) stage 4, GFR 15-29 ml/min ([address] 2/10/2017    COPD (chronic obstructive pulmonary disease) (HCC)     Diabetic gastroparesis (HCC)     Diastolic heart failure ([address] 10/5/2012    DM type 2 causing neurological disease ([address]     DM type 2 causing renal disease ([address]     DM type 2 causing vascular disease ([address]     Esophageal stricture 2012    dialted Dr. Wade Mis G6PD deficiency ([address]     trait    GERD (gastroesophageal reflux disease)     Gout     ILD (interstitial lung disease) ([address]     open lung bx CJW 2010    Morbid obesity ([address]     OA (osteoarthritis)     Ovarian cancer ([address]     cervical and uterine    Rheumatoid arteritis     S/P left pulmonary artery pressure sensor implant placement 8/31/2017    [facility]     Tobacco use disorder 3/21/2012    Uterine cervix cancer ([address]     Vitamin D deficiency 8/9/2013      Past Surgical History:   Procedure Laterality Date    CARDIAC SURG PROCEDURE UNLIST      stents    COLONOSCOPY N/A 6/24/2016    COLONOSCOPY performed by Terrance Fonseca MD at [address] HX APPENDECTOMY      HX CARPAL TUNNEL RELEASE      bilateral    HX CHOLECYSTECTOMY      HX HERNIA REPAIR      HX HYSTERECTOMY      HX ORTHOPAEDIC  11/12/12    right knee replacement    HX TONSIL AND ADENOIDECTOMY      UPPER GI ENDOSCOPY,VINOD W GUIDE  6/24/2016           Social History   Substance Use Topics    Smoking status: Former Smoker     Packs/day: 0.50     Years: 41.00     Types: Cigarettes     Quit date: 11/9/2014    Smokeless tobacco: Never Used    Alcohol use No      Family History   Problem Relation Age of Onset    Heart Disease Mother     Heart Disease Brother      Current Facility-Administered Medications   Medication Dose Route Frequency    0.9% sodium chloride infusion 250 mL  250 mL IntraVENous PRN    midazolam (VERSED) injection 5 mg  5 mg IntraVENous RAD PRN    fentaNYL citrate (PF) injection 100 mcg  100 mcg IntraVENous RAD PRN    [START ON 6/2/2018] bumetanide (BUMEX) tablet 2 mg  2 mg Oral BID    bumetanide (BUMEX) injection 2 mg  2 mg IntraVENous ONCE    lidocaine (PF) (XYLOCAINE) 10 mg/mL (1 %) injection 10 mL  10 mL IntraDERMal RAD ONCE    0.9% sodium chloride infusion 250 mL  250 mL IntraVENous PRN    nitroglycerin (NITROSTAT) tablet 0.4 mg  0.4 mg SubLINGual PRN    oxyCODONE-acetaminophen (PERCOCET 7.5) 7.5-325 mg per tablet 1 Tab  1 Tab Oral Q6H PRN    Warfarin: pharmacy to dose    Other Rx Dosing/Monitoring    amLODIPine (NORVASC) tablet 10 mg  10 mg Oral DAILY    albuterol (PROVENTIL VENTOLIN) nebulizer solution 2.5 mg  2.5 mg Nebulization Q6H PRN    allopurinol (ZYLOPRIM) tablet 100 mg  100 mg Oral DAILY    aspirin delayed-release tablet 81 mg  81 mg Oral DAILY    atorvastatin (LIPITOR) tablet 80 mg  80 mg Oral QHS    calcitRIOL (ROCALTROL) capsule 0.25 mcg  0.25 mcg Oral Q M, W, F & SAT    carvedilol (COREG) tablet 25 mg  25 mg Oral BID WITH MEALS    [START ON 6/5/2018] ergocalciferol (ERGOCALCIFEROL) capsule 50,000 Units  50,000 Units Oral every Tuesday    fluticasone (FLONASE) 50 mcg/actuation nasal spray 2 Spray  2 Spray Both Nostrils DAILY PRN    hydrOXYzine HCl (ATARAX) tablet 25 mg  25 mg Oral TID PRN    isosorbide mononitrate ER (IMDUR) tablet 120 mg  120 mg Oral DAILY    pantoprazole (PROTONIX) tablet 40 mg  40 mg Oral ACB    polyethylene glycol (MIRALAX) packet 17 g  17 g Oral DAILY    senna-docusate (PERICOLACE) 8.6-50 mg per tablet 1 Tab  1 Tab Oral DAILY    sodium chloride (NS) flush 5-10 mL  5-10 mL IntraVENous Q8H    sodium chloride (NS) flush 5-10 mL  5-10 mL IntraVENous PRN    naloxone (NARCAN) injection 0.4 mg  0.4 mg IntraVENous PRN    glucose chewable tablet 16 g  4 Tab Oral PRN    dextrose (D50W) injection syrg 12.5-25 g  12.5-25 g IntraVENous PRN    glucagon (GLUCAGEN) injection 1 mg  1 mg IntraMUSCular PRN    acetaminophen (TYLENOL) tablet 650 mg  650 mg Oral Q4H PRN    diphenhydrAMINE (BENADRYL) capsule 25 mg  25 mg Oral Q4H PRN    ondansetron (ZOFRAN) injection 4 mg  4 mg IntraVENous Q4H PRN    insulin lispro (HUMALOG) injection   SubCUTAneous AC&HS      Allergies   Allergen Reactions    Contrast Dye [Iodine] Anaphylaxis    Levaquin [Levofloxacin] Nausea and Vomiting    Morphine Hives and Itching        Review of Systems: A complete review of systems was obtained, negative except as described above. Physical Exam:     Visit Vitals    /83 (BP 1 Location: Right arm, BP Patient Position: Prone)    Pulse 65    Temp 98.5 °F (36.9 °C)    Resp 11    Wt 149 kg (328 lb 7.8 oz)    SpO2 98%    BMI 47.13 kg/m2     ECOG PS: 2-3  General: Well developed, no acute distress, obese  Eyes: PERRLA, EOMI, anicteric sclerae  HENT: Atraumatic, OP clear, TMs intact without erythema  Neck: Supple  Lymphatic: No cervical, supraclavicular, axillary or inguinal adenopathy  Respiratory: CTAB, normal respiratory effort, On supplemental O2  CV: Normal rate, regular rhythm, no murmurs, no peripheral edema  GI: Soft, nontender, nondistended, no masses. Unable to appreciate HSM due to body habitus. MS: Normal gait and station. Digits without clubbing or cyanosis. Skin: No rashes, ecchymoses, or petechiae. Normal temperature, turgor, and texture. Neuro/Psych: Alert, oriented. 5/5 strength in all 4 extremities. Appropriate affect, normal judgment/insight.     Results:     Lab Results   Component Value Date/Time    WBC 7.8 06/01/2018 09:06 AM    HGB 7.5 (L) 06/01/2018 09:06 AM    HCT 23.6 (L) 06/01/2018 09:06 AM    PLATELET 511 68/59/9913 09:06 AM    MCV 98.3 06/01/2018 09:06 AM    ABS. NEUTROPHILS 4.6 06/01/2018 09:06 AM    Hemoglobin (POC) 9.9 (L) 07/21/2013 09:51 PM    Hematocrit (POC) 29 (L) 07/21/2013 09:51 PM     Lab Results   Component Value Date/Time    Sodium 143 06/01/2018 09:06 AM    Potassium 3.8 06/01/2018 09:06 AM    Chloride 107 06/01/2018 09:06 AM    CO2 24 06/01/2018 09:06 AM    Glucose 124 (H) 06/01/2018 09:06 AM    BUN 57 (H) 06/01/2018 09:06 AM    Creatinine 3.63 (H) 06/01/2018 09:06 AM    GFR est AA 15 (L) 06/01/2018 09:06 AM    GFR est non-AA 13 (L) 06/01/2018 09:06 AM    Calcium 8.3 (L) 06/01/2018 09:06 AM    Sodium (POC) 140 07/21/2013 09:51 PM    Potassium (POC) 3.9 07/21/2013 09:51 PM    Chloride (POC) 109 (H) 07/21/2013 09:51 PM    Glucose (POC) 160 (H) 06/01/2018 11:38 AM    BUN (POC) 30 (H) 07/21/2013 09:51 PM    Creatinine (POC) 2.1 (H) 07/21/2013 09:51 PM    Calcium, ionized (POC) 1.19 07/21/2013 09:51 PM     Lab Results   Component Value Date/Time    Bilirubin, total 0.5 11/21/2017 12:40 PM    ALT (SGPT) 19 11/21/2017 12:40 PM    AST (SGOT) 24 11/21/2017 12:40 PM    Alk. phosphatase 94 11/21/2017 12:40 PM    Protein, total 6.8 11/21/2017 12:40 PM    Albumin 3.2 (L) 11/21/2017 12:40 PM    Globulin 3.6 11/21/2017 12:40 PM     Lab Results   Component Value Date/Time    Iron 55 05/31/2018 02:08 PM    TIBC 201 (L) 05/31/2018 02:08 PM    Iron % saturation 27 05/31/2018 02:08 PM    Ferritin 536 (H) 05/31/2018 02:08 PM       6/1/2018 Bone Marrow bx: results pending    Imaging:     Radiology report(s) reviewed. .    Assessment & Plan:   Erika Godoy is a 63 y.o. female with CAD/CHF, CKD admitted with CP and severe chronic anemia. 1. Normocytic anemia: Anemia of chronic renal disease, but not responding to EPO; unsure if she is receive Erythropoietin or Darbepoietin. When one agent shows no response, it may be worth switching to the alternate form.  However, given lack of response to EPO thus far as well as multiple other complicating factors including CAD/angina, will proceed with  further evaluation with bone marrow biopsy in order to rule out other pathology. Some patients can develop aplastic anemia as an adverse effect of EPO. No evidence of hemolysis. Retic is inappropriately low. Recheck iron profile reveals normal Ferritn  -- will follow  bone marrow biopsy (6/1) results: per radiologist request due to INR of 2.6 requesting FFP prior to BM bx; ordered and reviewed with pt.  -- Discussed with Dr Shante Smith and Dominga Connlely NP: agree 1 Unit of PRBCs may be helpful with reducing her cardiac symptoms; ordered with reviewed with pt: diuretics adjusted per cardio    2. Stage IV CKD: Followed by Dr. Sarah Cintron as outpatient. Has AVF in place. 3. CAD/diastolic CHF: On Coreg, ASA, Imdur, Metolazone, Lipitor, Bumex. 4. Chest pain / SOB: Seems consistent with chronic angina. Currently with negative troponin:  cardiology planning for  cardiac cath Monday (6/4)    5. COPD, Interstitial lung disease: On supplemental home oxygen @ 2L/min      6. DMII: Managed by PCP and currently stable. 7. History of RLE DVT: On Warfarin. Holding currently in preparation for procedures.    -- Holding Warfarin received Vit K (5/31) and FFP (6/1); discussed with pharmacy      Plan reviewed with Dr Patricia Mejia By: Elayne Roberts NP     June 1, 2018

## 2018-06-01 NOTE — PROGRESS NOTES
Cardiology Progress Note                             [address], [place], [address] [facility]                                 Phone [phone]; Fax [phone]        2018 10:18 AM     Admit Date:           2018  Admit Diagnosis:  Chest pain  :          1955   MRN:          [id number]   ASSESSMENT/RECOMMENDATION:   1)Chronic recurrent anginal chest pain, exertional and non exertional: [facility] cath scheduled for Tuesday to re-assess coronary arteries     CAD with 1v disease/RCA      Chronic HFpEF, class III, s/p CardioMEMS for PA monitoring: RHC on Tuesday to re-calibrate cardiomems     CKD: stable. Nephrology following, appreciate recommendations  -she will need to stay 1 day post cath to monitor renal function  -she does have an AV fistula    Chronic severe anemia, refractory to Procrit: hematology following - bone marrow biopsy planned for today    Chronic anticoagulation, supra therapeutic: s/p 5 mg of vitamin K last night, INR 2.6 today, getting FFP now prior to bone biopsy   -will need to continue to hold coumadin for cardiac cath on Monday    Morbid obesity Body mass index is 47.13 kg/(m^2). T2DM controlled       Addendum: Patient will also be transfused 1 unit of PRBCs, will give 2 mg of IV bumex post transfusion and hold PO bumex this evening to resume in the AM.               Last 3 Recorded Weights in this Encounter    18 1509 18 0458 18 0722   Weight: 335 lb (152 kg) 328 lb 7.8 oz (149 kg) 328 lb 7.8 oz (149 kg)          1901 -  0700  In: 1500 [P.O.:1500]  Out: [address] reports no chest pain today thus far. Breathing is okay. Having a bone marrow biopsy today.          Current Facility-Administered Medications   Medication Dose Route Frequency    0.9% sodium chloride infusion 250 mL  250 mL IntraVENous PRN    nitroglycerin (NITROSTAT) tablet 0.4 mg  0.4 mg SubLINGual PRN    oxyCODONE-acetaminophen (PERCOCET 7.5) 7.5-325 mg per tablet 1 Tab  1 Tab Oral Q6H PRN    Warfarin: pharmacy to dose    Other Rx Dosing/Monitoring    amLODIPine (NORVASC) tablet 10 mg  10 mg Oral DAILY    albuterol (PROVENTIL VENTOLIN) nebulizer solution 2.5 mg  2.5 mg Nebulization Q6H PRN    allopurinol (ZYLOPRIM) tablet 100 mg  100 mg Oral DAILY    aspirin delayed-release tablet 81 mg  81 mg Oral DAILY    atorvastatin (LIPITOR) tablet 80 mg  80 mg Oral QHS    bumetanide (BUMEX) tablet 2 mg  2 mg Oral BID    calcitRIOL (ROCALTROL) capsule 0.25 mcg  0.25 mcg Oral Q M, W, F & SAT    carvedilol (COREG) tablet 25 mg  25 mg Oral BID WITH MEALS    [START ON 6/5/2018] ergocalciferol (ERGOCALCIFEROL) capsule 50,000 Units  50,000 Units Oral every Tuesday    fluticasone (FLONASE) 50 mcg/actuation nasal spray 2 Spray  2 Spray Both Nostrils DAILY PRN    hydrOXYzine HCl (ATARAX) tablet 25 mg  25 mg Oral TID PRN    isosorbide mononitrate ER (IMDUR) tablet 120 mg  120 mg Oral DAILY    pantoprazole (PROTONIX) tablet 40 mg  40 mg Oral ACB    polyethylene glycol (MIRALAX) packet 17 g  17 g Oral DAILY    senna-docusate (PERICOLACE) 8.6-50 mg per tablet 1 Tab  1 Tab Oral DAILY    sodium chloride (NS) flush 5-10 mL  5-10 mL IntraVENous Q8H    sodium chloride (NS) flush 5-10 mL  5-10 mL IntraVENous PRN    naloxone (NARCAN) injection 0.4 mg  0.4 mg IntraVENous PRN    glucose chewable tablet 16 g  4 Tab Oral PRN    dextrose (D50W) injection syrg 12.5-25 g  12.5-25 g IntraVENous PRN    glucagon (GLUCAGEN) injection 1 mg  1 mg IntraMUSCular PRN    acetaminophen (TYLENOL) tablet 650 mg  650 mg Oral Q4H PRN    diphenhydrAMINE (BENADRYL) capsule 25 mg  25 mg Oral Q4H PRN    ondansetron (ZOFRAN) injection 4 mg  4 mg IntraVENous Q4H PRN    insulin lispro (HUMALOG) injection   SubCUTAneous AC&HS      OBJECTIVE Intake/Output Summary (Last 24 hours) at 06/01/18 1018  Last data filed at 05/31/18 1842   Gross per 24 hour   Intake              960 ml   Output                0 ml   Net              960 ml       Review of Systems - History obtained from the patient AS PER  HPI    Telemetry SB- SR 50-60s    PHYSICAL EXAM        Visit Vitals    /55    Pulse 63    Temp 97.5 °F (36.4 °C)    Resp 20    Wt 328 lb 7.8 oz (149 kg)    SpO2 95%  Comment: 2 liter    BMI 47.13 kg/m2       Gen: Well-developed, obese, in no acute distress  alert and oriented x 3  HEENT:  Pink conjunctivae, Hearing grossly normal.No scleral icterus or conjunctival, moist mucous membranes  Neck: Supple,No JVD  Resp: No accessory muscle use, Clear breath sounds, No rales or rhonchi  Card: Regular Rate,Rythm, No murmurs, rubs or gallop. No thrills.    GI:          soft, non-tender   MSK: No cyanosis or clubbing, good capillary refill  Skin: No rashes or ulcers, no bruising  Neuro:  Cranial nerves are grossly intact, moving all four extremities, no focal deficit, follows commands appropriately  Psych:  Good insight, oriented to person, place and time, alert, Nml Affect  LE: trace edema       DATA REVIEW            Laboratory and Imaging have been reviewed by me and are notable for  Recent Labs      05/31/18   0151  05/30/18   1530   TROIQ  <0.04  <0.04     Recent Labs      06/01/18   0906  05/31/18   1055  05/31/18   0151  05/30/18   1530   NA  143   --   142  144   K  3.8   --   3.8  3.8   CO2  24   --   24  23   BUN  57*   --   56*  51*   CREA  3.63*   --   3.61*  3.58*   GLU  124*   --   160*  196*   MG   --    --   1.0*   --    WBC  7.8   --   8.3  8.6   HGB  7.5*  7.5*  7.4*  7.8*   HCT  23.6*  24.0*  24.2*  25.3*   PLT  176   --   172  172             Fabby Reyes NP

## 2018-06-01 NOTE — PROGRESS NOTES
0945 Up to floor and assessed patient for CT bone marrow biopsy. Patient able to lay on stomach with Oxygen sat of 100% on 2 LNC. Airway assessed, S1S2 lungs CTA. Patient willing to proceed with knowledge of possible little sedation related to comorbidities. CABRERA Castro [facility] notified of Dr. Tatiana Aviles desiring FFP infusion immediately prior to procedure. To be ordered by Heme Onc. Arturo Goel. RN notified of need for FFP and will coordinate closely with her for timing of procedure. 1055 Notified CRISTINA Mendez of timing for FFP( to be given now). She will obtain from blood band and call prior to starting infusion. 1150 Patient arrived to [facility]. Baseline VSS. FFP infusing. Dr. Janette Ortiz over to consent patient with review of risks and benefits. Consent obtained and patient assessed. 1230 Patient brought to CT and placed prone on table and connected to monitor. Time out 1246 Start time 1247 End 1258. Versed 5 mg and Fentanyl 100 mcg given for sedation. Patient tolerated fair. CDI to right posterior hip.   1305 Back to [facility] and connected to the monitor. 1400 Patient awake and alert and denies pain. Report called to CRISTINA Mendez for transfer back to room 330.

## 2018-06-01 NOTE — PROGRESS NOTES
Ezio Craig [facility] [address]  [address] [facility], [place], [id number] [place] [address]  [phone]      Medical Progress Note      NAME:         Volodymyr Curtis   :        1955  MRM:        [id number]    Date:          2018      Subjective: \"I have chest pain\"     Pt seen and examined. C/o chest pain this AM. Some relief with SL nitro. Denies SOB. Scheduled for BMB this afternoon     Objective:    Vital Signs:    Visit Vitals    /68    Pulse 61    Temp 98.5 °F (36.9 °C)    Resp 20    Wt 149 kg (328 lb 7.8 oz)    SpO2 96%    BMI 47.13 kg/m2          Intake/Output Summary (Last 24 hours) at 18 1133  Last data filed at 18 1842   Gross per 24 hour   Intake              480 ml   Output                0 ml   Net              480 ml        Physical Examination:    General:   Weak and ill looking female patient, not in any acute distress   Eyes:   pale conjunctivae, PERRLA with no discharge. ENT:   no ottorrhea or rhinorrhea with moist mucous membranes  Neck: no masses, thyroid non-tender and trachea central.  Pulm:  no accessory muscle use, decreased but clear breath sounds without crackles or wheezes  Card:  no JVD or murmurs, has regular and normal S1, S2 without thrills, bruits. + peripheral edema  Abd:  Soft, non-tender, obese, normoactive bowel sounds with no palpable organomegaly  Musc:  No cyanosis, clubbing, atrophy or deformities. Skin:  No rashes, bruising or ulcers. Neuro: Awake and alert.  Generally a non focal exam. Follows commands appropriately  Psych:  Has a fair insight and is oriented x 3    Current Facility-Administered Medications   Medication Dose Route Frequency    0.9% sodium chloride infusion 250 mL  250 mL IntraVENous PRN    midazolam (VERSED) injection 5 mg  5 mg IntraVENous RAD PRN    fentaNYL citrate (PF) injection 100 mcg  100 mcg IntraVENous RAD PRN    [START ON 2018] bumetanide (BUMEX) tablet 2 mg  2 mg Oral BID    bumetanide (BUMEX) injection 2 mg  2 mg IntraVENous ONCE    nitroglycerin (NITROSTAT) tablet 0.4 mg  0.4 mg SubLINGual PRN    oxyCODONE-acetaminophen (PERCOCET 7.5) 7.5-325 mg per tablet 1 Tab  1 Tab Oral Q6H PRN    Warfarin: pharmacy to dose    Other Rx Dosing/Monitoring    amLODIPine (NORVASC) tablet 10 mg  10 mg Oral DAILY    albuterol (PROVENTIL VENTOLIN) nebulizer solution 2.5 mg  2.5 mg Nebulization Q6H PRN    allopurinol (ZYLOPRIM) tablet 100 mg  100 mg Oral DAILY    aspirin delayed-release tablet 81 mg  81 mg Oral DAILY    atorvastatin (LIPITOR) tablet 80 mg  80 mg Oral QHS    calcitRIOL (ROCALTROL) capsule 0.25 mcg  0.25 mcg Oral Q M, W, F & SAT    carvedilol (COREG) tablet 25 mg  25 mg Oral BID WITH MEALS    [START ON 6/5/2018] ergocalciferol (ERGOCALCIFEROL) capsule 50,000 Units  50,000 Units Oral every Tuesday    fluticasone (FLONASE) 50 mcg/actuation nasal spray 2 Spray  2 Spray Both Nostrils DAILY PRN    hydrOXYzine HCl (ATARAX) tablet 25 mg  25 mg Oral TID PRN    isosorbide mononitrate ER (IMDUR) tablet 120 mg  120 mg Oral DAILY    pantoprazole (PROTONIX) tablet 40 mg  40 mg Oral ACB    polyethylene glycol (MIRALAX) packet 17 g  17 g Oral DAILY    senna-docusate (PERICOLACE) 8.6-50 mg per tablet 1 Tab  1 Tab Oral DAILY    sodium chloride (NS) flush 5-10 mL  5-10 mL IntraVENous Q8H    sodium chloride (NS) flush 5-10 mL  5-10 mL IntraVENous PRN    naloxone (NARCAN) injection 0.4 mg  0.4 mg IntraVENous PRN    glucose chewable tablet 16 g  4 Tab Oral PRN    dextrose (D50W) injection syrg 12.5-25 g  12.5-25 g IntraVENous PRN    glucagon (GLUCAGEN) injection 1 mg  1 mg IntraMUSCular PRN    acetaminophen (TYLENOL) tablet 650 mg  650 mg Oral Q4H PRN    diphenhydrAMINE (BENADRYL) capsule 25 mg  25 mg Oral Q4H PRN    ondansetron (ZOFRAN) injection 4 mg  4 mg IntraVENous Q4H PRN    insulin lispro (HUMALOG) injection   SubCUTAneous AC&HS Laboratory data and review:    Recent Labs      06/01/18   0906  05/31/18   1055  05/31/18   0151  05/30/18   1530   WBC  7.8   --   8.3  8.6   HGB  7.5*  7.5*  7.4*  7.8*   HCT  23.6*  24.0*  24.2*  25.3*   PLT  176   --   172  172     Recent Labs      06/01/18   0906  06/01/18   0107  05/31/18   0151  05/30/18   1530   NA  143   --   142  144   K  3.8   --   3.8  3.8   CL  107   --   107  108   CO2  24   --   24  23   GLU  124*   --   160*  196*   BUN  57*   --   56*  51*   CREA  3.63*   --   3.61*  3.58*   CA  8.3*   --   8.4*  8.7   MG   --    --   1.0*   --    INR   --   2.6*  3.8*  3.3*     No components found for: GLPOC    Assessment and Plan:  Chest pain / Dyspnea POA: she has chronic anginal symptoms  -continue ASA, statin, coreg, Imdur   -plans for cardiac cath next week   -transfuse to hemoglobin > 8    CAD (coronary Artery Disease) Native Artery / Chronic diastolic heart failure / HTN (hypertension): POA  -continue meds as above   -continue diuresis   -strict I's and O's   -daily weights      Morbid obesity / JASEN on CPAP:   -CPAP   -weight loss      Chronic respiratory failure with hypoxia / COPD, severe / ILD (interstitial lung disease) / Pulmonary HTN POA:  -continue O2    Gastroparesis / Esophageal reflux:   -PPI     Normocytic anemia POA:   -heme/onc on board   -plans for BMB today      DM (diabetes mellitus), type 2 with renal, vascular and neurological complications POA:   -ISS   -BG checks AC TID and qHS      CKD (chronic kidney disease) stage 4 POA:   -nephrology on board; monitor     Hx of deep venous thrombosis / Warfarin anticoagulation:   -coumadin will need to be held in prep for cath      Gout: stable  -continue allopurinol     Total time spent for the patient's care: 35 Minutes                  Care Plan discussed with: Patient, Care Manager and Nursing Staff    Discussed:  Care Plan and D/C Planning    Prophylaxis:  Coumadin    Anticipated Disposition:  Home w/Family           ___________________________________________________    Attending Physician:   Karine Crum MD

## 2018-06-01 NOTE — PROGRESS NOTES
Problem: Falls - Risk of  Goal: *Absence of Falls  Document Sarah Fall Risk and appropriate interventions in the flowsheet.    Outcome: Progressing Towards Goal  Fall Risk Interventions:            Medication Interventions: Teach patient to arise slowly         History of Falls Interventions: Consult care management for discharge planning, Evaluate medications/consider consulting pharmacy, Utilize gait belt for transfer/ambulation

## 2018-06-01 NOTE — TELEPHONE ENCOUNTER
Mrs. [facility] RODRIGUEZ has a [employer] device in place .  She needs to have a repeat [address] scheduled with Dr. Maria Luz Phillips in order to recalibrate this device.  This will need to be coordinated with Dr. Maria Luz Phillips, Jeff Cespedes RN (she is in the hospital and helps me monitor all of our devices) and possibly the [employer] representative (please clarify this need with Dr. Maria Luz Phillips and/or Harrison Acevedo NP

## 2018-06-01 NOTE — H&P
Interventional Radiology History and Physical (Inpatient)    Patient: Jose M Ryenoso 63 y.o. female     Referring Physician:  No ref. provider found    Chief Complaint: Abnormal Lab Results and Seen By Non-ED Provider      History of Present Illness: anemia    History:  Past Medical History:   Diagnosis Date     Sleep Apnea 2/16/2011    Aortic aneurysm ([address]     CAD (coronary Artery Disease) Native Artery 2/16/2011    CKD (chronic kidney disease) stage 4, GFR 15-29 ml/min ([address] 2/10/2017    COPD (chronic obstructive pulmonary disease) (HCC)     Diabetic gastroparesis (HCC)     Diastolic heart failure ([address] 10/5/2012    DM type 2 causing neurological disease ([address]     DM type 2 causing renal disease ([address]     DM type 2 causing vascular disease ([address]     Esophageal stricture 2012    dialted Dr. Jaylin Macdonald G6PD deficiency ([address]     trait    GERD (gastroesophageal reflux disease)     Gout     ILD (interstitial lung disease) ([address]     open lung bx CJW 2010    Morbid obesity ([address]     OA (osteoarthritis)     Ovarian cancer ([address]     cervical and uterine    Rheumatoid arteritis     S/P left pulmonary artery pressure sensor implant placement 8/31/2017    [employer]     Tobacco use disorder 3/21/2012    Uterine cervix cancer ([address]     Vitamin D deficiency 8/9/2013     Family History   Problem Relation Age of Onset    Heart Disease Mother     Heart Disease Brother      Social History     Social History    Marital status:      Spouse name: N/A    Number of children: N/A    Years of education: N/A     Occupational History    Not on file. Social History Main Topics    Smoking status: Former Smoker     Packs/day: 0.50     Years: 41.00     Types: Cigarettes     Quit date: 11/9/2014    Smokeless tobacco: Never Used    Alcohol use No    Drug use: No    Sexual activity: Not on file     Other Topics Concern    Not on file     Social History Narrative       Allergies:    Allergies Allergen Reactions    Contrast Dye [Iodine] Anaphylaxis    Levaquin [Levofloxacin] Nausea and Vomiting    Morphine Hives and Itching       Current Medications:  Current Facility-Administered Medications   Medication Dose Route Frequency    0.9% sodium chloride infusion 250 mL  250 mL IntraVENous PRN    midazolam (VERSED) injection 5 mg  5 mg IntraVENous RAD PRN    fentaNYL citrate (PF) injection 100 mcg  100 mcg IntraVENous RAD PRN    [START ON 6/2/2018] bumetanide (BUMEX) tablet 2 mg  2 mg Oral BID    bumetanide (BUMEX) injection 2 mg  2 mg IntraVENous ONCE    nitroglycerin (NITROSTAT) tablet 0.4 mg  0.4 mg SubLINGual PRN    oxyCODONE-acetaminophen (PERCOCET 7.5) 7.5-325 mg per tablet 1 Tab  1 Tab Oral Q6H PRN    Warfarin: pharmacy to dose    Other Rx Dosing/Monitoring    amLODIPine (NORVASC) tablet 10 mg  10 mg Oral DAILY    albuterol (PROVENTIL VENTOLIN) nebulizer solution 2.5 mg  2.5 mg Nebulization Q6H PRN    allopurinol (ZYLOPRIM) tablet 100 mg  100 mg Oral DAILY    aspirin delayed-release tablet 81 mg  81 mg Oral DAILY    atorvastatin (LIPITOR) tablet 80 mg  80 mg Oral QHS    calcitRIOL (ROCALTROL) capsule 0.25 mcg  0.25 mcg Oral Q M, W, F & SAT    carvedilol (COREG) tablet 25 mg  25 mg Oral BID WITH MEALS    [START ON 6/5/2018] ergocalciferol (ERGOCALCIFEROL) capsule 50,000 Units  50,000 Units Oral every Tuesday    fluticasone (FLONASE) 50 mcg/actuation nasal spray 2 Spray  2 Spray Both Nostrils DAILY PRN    hydrOXYzine HCl (ATARAX) tablet 25 mg  25 mg Oral TID PRN    isosorbide mononitrate ER (IMDUR) tablet 120 mg  120 mg Oral DAILY    pantoprazole (PROTONIX) tablet 40 mg  40 mg Oral ACB    polyethylene glycol (MIRALAX) packet 17 g  17 g Oral DAILY    senna-docusate (PERICOLACE) 8.6-50 mg per tablet 1 Tab  1 Tab Oral DAILY    sodium chloride (NS) flush 5-10 mL  5-10 mL IntraVENous Q8H    sodium chloride (NS) flush 5-10 mL  5-10 mL IntraVENous PRN    naloxone (NARCAN) injection 0.4 mg  0.4 mg IntraVENous PRN    glucose chewable tablet 16 g  4 Tab Oral PRN    dextrose (D50W) injection syrg 12.5-25 g  12.5-25 g IntraVENous PRN    glucagon (GLUCAGEN) injection 1 mg  1 mg IntraMUSCular PRN    acetaminophen (TYLENOL) tablet 650 mg  650 mg Oral Q4H PRN    diphenhydrAMINE (BENADRYL) capsule 25 mg  25 mg Oral Q4H PRN    ondansetron (ZOFRAN) injection 4 mg  4 mg IntraVENous Q4H PRN    insulin lispro (HUMALOG) injection   SubCUTAneous AC&HS        Physical Exam:  Blood pressure 148/68, pulse 61, temperature 98.5 °F (36.9 °C), resp. rate 20, weight 149 kg (328 lb 7.8 oz), SpO2 96 %.   GENERAL: alert, cooperative, no distress, appears stated age, LUNG: clear to auscultation bilaterally, HEART: regular rate and rhythm, S1, S2 normal, no murmur, click, rub or gallop    Findings/Diagnosis: ct guided bone marrow biopsy    Alerts:    Hospital Problems  Date Reviewed: 5/30/2018          Codes Class Noted POA    Hx of deep venous thrombosis ICD-10-CM: Z86.718  ICD-9-CM: V12.51  5/30/2018 Yes        Chest pain ICD-10-CM: R07.9  ICD-9-CM: 786.50  11/21/2017 Yes        ILD (interstitial lung disease) ([facility] ICD-10-CM: J84.9  ICD-9-CM: 939  8/20/2017 Yes        Warfarin anticoagulation ICD-10-CM: Z79.01  ICD-9-CM: V58.61  8/20/2017 Yes        COPD, severe ([facility] ICD-10-CM: J44.9  ICD-9-CM: 381  6/21/2017 Yes        CKD (chronic kidney disease) stage 4, GFR 15-29 ml/min (HCC) (Chronic) ICD-10-CM: N18.4  ICD-9-CM: 585.4  2/10/2017 Yes        DM (diabetes mellitus), type 2 with renal complications (HCC) (Chronic) ICD-10-CM: E11.29  ICD-9-CM: 250.40  8/9/2013 Yes        Normocytic anemia (Chronic) ICD-10-CM: D64.9  ICD-9-CM: 285.9  5/26/2013 Yes        Chronic diastolic heart failure (HCC) (Chronic) ICD-10-CM: I50.32  ICD-9-CM: 428.32  10/5/2012 Yes        HTN (hypertension) (Chronic) ICD-10-CM: I10  ICD-9-CM: 401.9  10/1/2012 Yes        Morbid obesity (Chronic) ICD-10-CM: E66.01  ICD-9-CM: 278.01  10/1/2012 Yes Esophageal reflux ICD-10-CM: K21.9  ICD-9-CM: 530.81  10/1/2012 Yes        Gastroparesis ICD-10-CM: K31.84  ICD-9-CM: 536.3  10/1/2012 Yes        Pulmonary HTN (Chronic) ICD-10-CM: I27.20  ICD-9-CM: 416.8  3/21/2012 Yes        CAD (coronary Artery Disease) Native Artery (Chronic) ICD-10-CM: I25.10  ICD-9-CM: 414.01  2/16/2011 Yes    Overview Addendum 10/5/2012  7:33 AM by PRASHANT Goncalves     [place] 2004  1v CAD by cath - PCI OM with SAL  Cath 5/2004 - patent stent, no new disease  Lexiscan cardiolite 12/09- normal perfusion, EF 66%  Cath: 3/19/12: PA 55/30 mean 41, wedge 26, RA 24, EDP 35, LVG 55%, LM normal, LAD normal, LCX - OM1 patent stent, OM2 prox 85% long, % with L to R collaterals                JASEN on CPAP (Chronic) ICD-10-CM: G47.33, Z99.89  ICD-9-CM: 327.23, V46.8  2/16/2011 Yes    Overview Signed 3/21/2012  8:04 AM by PRASHANT Goncalves Dr. CPAP                   Laboratory:      Recent Labs      06/01/18   0906  06/01/18   0107   HGB  7.5*   --    HCT  23.6*   --    WBC  7.8   --    PLT  176   --    INR   --   2.6*   BUN  57*   --    CREA  3.63*   --    K  3.8   --          Plan of Care/Planned Procedure:  Risks, benefits, and alternatives reviewed with patient and she agrees to proceed with the procedure. Full dictated report to follow.

## 2018-06-01 NOTE — PROGRESS NOTES
1305:  Pt returned to Rad holding via stretcher. Tolerated CT-guided bone marrow biopsy with total of Versed 5mg IV and Fentanyl 100 mcg IV administered throughout procedure. FFP 311ml transfused just prior to start of procedure. Responding to voice/directions; otherwise resting with eyes closed. VS stable. RN at bedside and will continue to monitor. 1400:  Patient wakes independently; is alert and oriented. VS remain stable. Pt denies any pain. Tolerating sips of ginger ale. Gauze/foam tape dressing to lower back CDI with no signs of complication. Report has been called to receiving RN on 3 Tele; awaiting transport.

## 2018-06-01 NOTE — PHYSICIAN ADVISORY
Letter of Status Determination:   Recommend hospitalization status upgraded from   OBSERVATION  to INPATIENT  Status     Pt Name:  Melvi Martinez   MR#   [address] [facility] # [id number] /  [id number]   [facility]#  [id number]   [address] [place] [address]  330/01  @ [facility]   Hospitalization date  5/30/2018  3:11 PM   Current Attending Physician  Yue Leo MD   Principal diagnosis  <principal problem not specified>   Chest pain    Clinicals  63 y.o. y.o  female hospitalized with above diagnosis   This pt has known CAD who is being evaluated and treated for recurrent anginal chest pain. Pt also suffers from complex morbidities including chronic kidney disease, anemia and other conditions. PRBC transfusion plan noted. Due to appropriate and necessary medical care, this pt's hospitalization has now exceeded two midnights . [place] ([facility]) criteria   Does  NOT apply    STATUS DETERMINATION  This patient is at above high risk of deterioration based on documented presenting clinical data, comorbid conditions, high risk of adverse events and current acute care course. Ms. Melvi Martinez now meets Inpatient Admission status criteria in accordance with CMS regulation Section 43 .3. Specifically, due to medical necessity the patient's stay now exceeds Two Midnights. It is our recommendation that this patient's hospitalization status should be upgraded from  OBSERVATION to INPATIENT status.      The final decision of the patient's hospitalization status depends on the attending physician's judgment            Additional comments     Payor: [employer] / Plan: [facility] / Product Type: Medicare /         Adan Patel MD MPH [address] [place] [address] [employer], [address] [place] [address]  [phone]        [phone]    .

## 2018-06-02 LAB
ANION GAP SERPL CALC-SCNC: 10 MMOL/L (ref 5–15)
APTT PPP: 60 SEC (ref 22.1–32)
BASOPHILS # BLD: 0 K/UL (ref 0–0.1)
BASOPHILS NFR BLD: 0 % (ref 0–1)
BLD PROD TYP BPU: NORMAL
BPU ID: NORMAL
BUN SERPL-MCNC: 61 MG/DL (ref 6–20)
BUN/CREAT SERPL: 16 (ref 12–20)
CALCIUM SERPL-MCNC: 8.5 MG/DL (ref 8.5–10.1)
CHLORIDE SERPL-SCNC: 108 MMOL/L (ref 97–108)
CO2 SERPL-SCNC: 24 MMOL/L (ref 21–32)
CREAT SERPL-MCNC: 3.7 MG/DL (ref 0.55–1.02)
DIFFERENTIAL METHOD BLD: ABNORMAL
EOSINOPHIL # BLD: 0.4 K/UL (ref 0–0.4)
EOSINOPHIL NFR BLD: 5 % (ref 0–7)
ERYTHROCYTE [DISTWIDTH] IN BLOOD BY AUTOMATED COUNT: 17.5 % (ref 11.5–14.5)
GLUCOSE BLD STRIP.AUTO-MCNC: 127 MG/DL (ref 65–100)
GLUCOSE BLD STRIP.AUTO-MCNC: 134 MG/DL (ref 65–100)
GLUCOSE BLD STRIP.AUTO-MCNC: 196 MG/DL (ref 65–100)
GLUCOSE SERPL-MCNC: 112 MG/DL (ref 65–100)
HCT VFR BLD AUTO: 24.9 % (ref 35–47)
HGB BLD-MCNC: 7.9 G/DL (ref 11.5–16)
IMM GRANULOCYTES # BLD: 0 K/UL (ref 0–0.04)
IMM GRANULOCYTES NFR BLD AUTO: 0 % (ref 0–0.5)
INR PPP: 1.4 (ref 0.9–1.1)
LYMPHOCYTES # BLD: 2.3 K/UL (ref 0.8–3.5)
LYMPHOCYTES NFR BLD: 28 % (ref 12–49)
MAGNESIUM SERPL-MCNC: 1.2 MG/DL (ref 1.6–2.4)
MCH RBC QN AUTO: 30.2 PG (ref 26–34)
MCHC RBC AUTO-ENTMCNC: 31.7 G/DL (ref 30–36.5)
MCV RBC AUTO: 95 FL (ref 80–99)
MONOCYTES # BLD: 0.6 K/UL (ref 0–1)
MONOCYTES NFR BLD: 7 % (ref 5–13)
NEUTS SEG # BLD: 5 K/UL (ref 1.8–8)
NEUTS SEG NFR BLD: 60 % (ref 32–75)
NRBC # BLD: 0 K/UL (ref 0–0.01)
NRBC BLD-RTO: 0 PER 100 WBC
PLATELET # BLD AUTO: 153 K/UL (ref 150–400)
PMV BLD AUTO: 10.9 FL (ref 8.9–12.9)
POTASSIUM SERPL-SCNC: 4.1 MMOL/L (ref 3.5–5.1)
PROTHROMBIN TIME: 14.3 SEC (ref 9–11.1)
RBC # BLD AUTO: 2.62 M/UL (ref 3.8–5.2)
SERVICE CMNT-IMP: ABNORMAL
SODIUM SERPL-SCNC: 142 MMOL/L (ref 136–145)
STATUS OF UNIT,%ST: NORMAL
THERAPEUTIC RANGE,PTTT: ABNORMAL SECS (ref 58–77)
UNIT DIVISION, %UDIV: 0
WBC # BLD AUTO: 8.3 K/UL (ref 3.6–11)

## 2018-06-02 PROCEDURE — 74011636637 HC RX REV CODE- 636/637: Performed by: INTERNAL MEDICINE

## 2018-06-02 PROCEDURE — 74011250637 HC RX REV CODE- 250/637: Performed by: INTERNAL MEDICINE

## 2018-06-02 PROCEDURE — 85610 PROTHROMBIN TIME: CPT | Performed by: INTERNAL MEDICINE

## 2018-06-02 PROCEDURE — 80048 BASIC METABOLIC PNL TOTAL CA: CPT | Performed by: INTERNAL MEDICINE

## 2018-06-02 PROCEDURE — P9016 RBC LEUKOCYTES REDUCED: HCPCS | Performed by: EMERGENCY MEDICINE

## 2018-06-02 PROCEDURE — 85025 COMPLETE CBC W/AUTO DIFF WBC: CPT | Performed by: INTERNAL MEDICINE

## 2018-06-02 PROCEDURE — 94660 CPAP INITIATION&MGMT: CPT

## 2018-06-02 PROCEDURE — 74011250637 HC RX REV CODE- 250/637: Performed by: NURSE PRACTITIONER

## 2018-06-02 PROCEDURE — 36415 COLL VENOUS BLD VENIPUNCTURE: CPT | Performed by: INTERNAL MEDICINE

## 2018-06-02 PROCEDURE — 74011250636 HC RX REV CODE- 250/636

## 2018-06-02 PROCEDURE — 77010033678 HC OXYGEN DAILY

## 2018-06-02 PROCEDURE — 82962 GLUCOSE BLOOD TEST: CPT

## 2018-06-02 PROCEDURE — 74011250636 HC RX REV CODE- 250/636: Performed by: INTERNAL MEDICINE

## 2018-06-02 PROCEDURE — 85730 THROMBOPLASTIN TIME PARTIAL: CPT | Performed by: SPECIALIST

## 2018-06-02 PROCEDURE — 65660000000 HC RM CCU STEPDOWN

## 2018-06-02 PROCEDURE — 83735 ASSAY OF MAGNESIUM: CPT | Performed by: INTERNAL MEDICINE

## 2018-06-02 PROCEDURE — 36430 TRANSFUSION BLD/BLD COMPNT: CPT

## 2018-06-02 RX ORDER — HEPARIN SODIUM 10000 [USP'U]/100ML
10-36 INJECTION, SOLUTION INTRAVENOUS
Status: DISCONTINUED | OUTPATIENT
Start: 2018-06-02 | End: 2018-06-05

## 2018-06-02 RX ORDER — SODIUM CHLORIDE 9 MG/ML
250 INJECTION, SOLUTION INTRAVENOUS AS NEEDED
Status: DISCONTINUED | OUTPATIENT
Start: 2018-06-02 | End: 2018-06-15 | Stop reason: HOSPADM

## 2018-06-02 RX ORDER — HYDROMORPHONE HYDROCHLORIDE 2 MG/ML
INJECTION, SOLUTION INTRAMUSCULAR; INTRAVENOUS; SUBCUTANEOUS
Status: COMPLETED
Start: 2018-06-02 | End: 2018-06-02

## 2018-06-02 RX ORDER — HEPARIN SODIUM 5000 [USP'U]/ML
5000 INJECTION, SOLUTION INTRAVENOUS; SUBCUTANEOUS EVERY 8 HOURS
Status: DISCONTINUED | OUTPATIENT
Start: 2018-06-02 | End: 2018-06-02

## 2018-06-02 RX ORDER — HYDROMORPHONE HYDROCHLORIDE 2 MG/ML
0.5 INJECTION, SOLUTION INTRAMUSCULAR; INTRAVENOUS; SUBCUTANEOUS
Status: DISCONTINUED | OUTPATIENT
Start: 2018-06-02 | End: 2018-06-08 | Stop reason: CLARIF

## 2018-06-02 RX ORDER — HEPARIN SODIUM 5000 [USP'U]/ML
67.1 INJECTION, SOLUTION INTRAVENOUS; SUBCUTANEOUS ONCE
Status: COMPLETED | OUTPATIENT
Start: 2018-06-02 | End: 2018-06-02

## 2018-06-02 RX ADMIN — NITROGLYCERIN 0.4 MG: 0.4 TABLET SUBLINGUAL at 13:00

## 2018-06-02 RX ADMIN — HYDROMORPHONE HYDROCHLORIDE 0.5 MG: 2 INJECTION INTRAMUSCULAR; INTRAVENOUS; SUBCUTANEOUS at 17:00

## 2018-06-02 RX ADMIN — CARVEDILOL 25 MG: 12.5 TABLET, FILM COATED ORAL at 07:58

## 2018-06-02 RX ADMIN — ALLOPURINOL 100 MG: 100 TABLET ORAL at 08:00

## 2018-06-02 RX ADMIN — ISOSORBIDE MONONITRATE 120 MG: 60 TABLET ORAL at 08:00

## 2018-06-02 RX ADMIN — AMLODIPINE BESYLATE 10 MG: 5 TABLET ORAL at 08:01

## 2018-06-02 RX ADMIN — POLYETHYLENE GLYCOL (3350) 17 G: 17 POWDER, FOR SOLUTION ORAL at 08:01

## 2018-06-02 RX ADMIN — STANDARDIZED SENNA CONCENTRATE AND DOCUSATE SODIUM 1 TABLET: 8.6; 5 TABLET, FILM COATED ORAL at 08:00

## 2018-06-02 RX ADMIN — ATORVASTATIN CALCIUM 80 MG: 20 TABLET, FILM COATED ORAL at 21:55

## 2018-06-02 RX ADMIN — BUMETANIDE 2 MG: 1 TABLET ORAL at 17:05

## 2018-06-02 RX ADMIN — DIPHENHYDRAMINE HYDROCHLORIDE 25 MG: 25 CAPSULE ORAL at 20:55

## 2018-06-02 RX ADMIN — Medication 10 ML: at 14:00

## 2018-06-02 RX ADMIN — OXYCODONE HYDROCHLORIDE AND ACETAMINOPHEN 1 TABLET: 7.5; 325 TABLET ORAL at 13:20

## 2018-06-02 RX ADMIN — CARVEDILOL 25 MG: 12.5 TABLET, FILM COATED ORAL at 16:32

## 2018-06-02 RX ADMIN — HYDROMORPHONE HYDROCHLORIDE 0.5 MG: 2 INJECTION INTRAMUSCULAR; INTRAVENOUS; SUBCUTANEOUS at 20:50

## 2018-06-02 RX ADMIN — HEPARIN SODIUM AND DEXTROSE 10 UNITS/KG/HR: 10000; 5 INJECTION INTRAVENOUS at 18:12

## 2018-06-02 RX ADMIN — INSULIN LISPRO 3 UNITS: 100 INJECTION, SOLUTION INTRAVENOUS; SUBCUTANEOUS at 13:18

## 2018-06-02 RX ADMIN — NITROGLYCERIN 0.4 MG: 0.4 TABLET SUBLINGUAL at 13:05

## 2018-06-02 RX ADMIN — BUMETANIDE 2 MG: 1 TABLET ORAL at 08:01

## 2018-06-02 RX ADMIN — HEPARIN SODIUM AND DEXTROSE 10 UNITS/KG/HR: 10000; 5 INJECTION INTRAVENOUS at 21:34

## 2018-06-02 RX ADMIN — HYDROMORPHONE HYDROCHLORIDE 0.5 MG: 2 INJECTION INTRAMUSCULAR; INTRAVENOUS; SUBCUTANEOUS at 12:03

## 2018-06-02 RX ADMIN — HEPARIN SODIUM 10000 UNITS: 5000 INJECTION, SOLUTION INTRAVENOUS; SUBCUTANEOUS at 16:32

## 2018-06-02 RX ADMIN — ASPIRIN 81 MG: 81 TABLET, COATED ORAL at 08:00

## 2018-06-02 RX ADMIN — Medication 10 ML: at 04:49

## 2018-06-02 RX ADMIN — CALCITRIOL 0.25 MCG: 0.25 CAPSULE, LIQUID FILLED ORAL at 08:00

## 2018-06-02 RX ADMIN — Medication 10 ML: at 22:00

## 2018-06-02 NOTE — PROGRESS NOTES
[address] [facility]  YOB: 1955          Assessment & Plan:   CKD 4  · Cr Stable   · At risk of ARF p contast and could need HD. Pt aware  · Has AVF which is ready for use    CP  · On oxygen  · Cath at some point per cards: may be next week    HTN   · Reasonably controlled    SHPT   · Calcitriol    Anemia of CKD   · On EPO  · S/p BMB         Subjective:   CC: f/u CKD  HPI: CKD:cr stable  Same sob  S/p BMB   DW FAMILY. Bumex for volume control.   ROS: no n/v/   Current Facility-Administered Medications   Medication Dose Route Frequency    0.9% sodium chloride infusion 250 mL  250 mL IntraVENous PRN    bumetanide (BUMEX) tablet 2 mg  2 mg Oral BID    0.9% sodium chloride infusion 250 mL  250 mL IntraVENous PRN    nitroglycerin (NITROSTAT) tablet 0.4 mg  0.4 mg SubLINGual PRN    oxyCODONE-acetaminophen (PERCOCET 7.5) 7.5-325 mg per tablet 1 Tab  1 Tab Oral Q6H PRN    Warfarin: pharmacy to dose    Other Rx Dosing/Monitoring    amLODIPine (NORVASC) tablet 10 mg  10 mg Oral DAILY    albuterol (PROVENTIL VENTOLIN) nebulizer solution 2.5 mg  2.5 mg Nebulization Q6H PRN    allopurinol (ZYLOPRIM) tablet 100 mg  100 mg Oral DAILY    aspirin delayed-release tablet 81 mg  81 mg Oral DAILY    atorvastatin (LIPITOR) tablet 80 mg  80 mg Oral QHS    calcitRIOL (ROCALTROL) capsule 0.25 mcg  0.25 mcg Oral Q M, W, F & SAT    carvedilol (COREG) tablet 25 mg  25 mg Oral BID WITH MEALS    [START ON 6/5/2018] ergocalciferol (ERGOCALCIFEROL) capsule 50,000 Units  50,000 Units Oral every Tuesday    fluticasone (FLONASE) 50 mcg/actuation nasal spray 2 Spray  2 Spray Both Nostrils DAILY PRN    hydrOXYzine HCl (ATARAX) tablet 25 mg  25 mg Oral TID PRN    isosorbide mononitrate ER (IMDUR) tablet 120 mg  120 mg Oral DAILY    pantoprazole (PROTONIX) tablet 40 mg  40 mg Oral ACB    polyethylene glycol (MIRALAX) packet 17 g  17 g Oral DAILY    senna-docusate (PERICOLACE) 8.6-50 mg per tablet 1 Tab  1 Tab Oral DAILY    sodium chloride (NS) flush 5-10 mL  5-10 mL IntraVENous Q8H    sodium chloride (NS) flush 5-10 mL  5-10 mL IntraVENous PRN    naloxone (NARCAN) injection 0.4 mg  0.4 mg IntraVENous PRN    glucose chewable tablet 16 g  4 Tab Oral PRN    dextrose (D50W) injection syrg 12.5-25 g  12.5-25 g IntraVENous PRN    glucagon (GLUCAGEN) injection 1 mg  1 mg IntraMUSCular PRN    acetaminophen (TYLENOL) tablet 650 mg  650 mg Oral Q4H PRN    diphenhydrAMINE (BENADRYL) capsule 25 mg  25 mg Oral Q4H PRN    ondansetron (ZOFRAN) injection 4 mg  4 mg IntraVENous Q4H PRN    insulin lispro (HUMALOG) injection   SubCUTAneous AC&HS          Objective:     Vitals:  Blood pressure 142/72, pulse 68, temperature 97.6 °F (36.4 °C), resp. rate 16, weight 149 kg (328 lb 7.8 oz), SpO2 98 %. Temp (24hrs), Av.9 °F (36.6 °C), Min:97.4 °F (36.3 °C), Max:98.8 °F (37.1 °C)      Intake and Output:      1901 -  0700  In: 2903 [P.O.:660]  Out: 1200 [Urine:1200]    Physical Exam:               GENERAL ASSESSMENT: NAD  CHEST: CTA  HEART: S1S2  ABDOMEN: Soft,NT  EXTREMITY: no EDEMA          Data Review      No results for input(s): TNIPOC in the last 72 hours.     No lab exists for component: ITNL   Recent Labs      18   0151  18   1530   TROIQ  <0.04  <0.04     Recent Labs      18   0355  18   0906  18   1055  18   0151   NA  142  143   --   142   K  4.1  3.8   --   3.8   CL  108  107   --   107   CO2  24  24   --   24   BUN  61*  57*   --   56*   CREA  3.70*  3.63*   --   3.61*   GLU  112*  124*   --   160*   MG  1.2*   --    --   1.0*   CA  8.5  8.3*   --   8.4*   WBC  8.3  7.8   --   8.3   HGB  7.9*  7.5*  7.5*  7.4*   HCT  24.9*  23.6*  24.0*  24.2*   PLT  153  176   --   172      Recent Labs      18   0355  18   0107  18   0151   INR  1.4*  2.6*  3.8*   PTP  14.3*  26.6*  38.2*     Needs: urine analysis, urine sodium, protein and creatinine  Lab Results   Component Value Date/Time    Sodium urine, random 25 11/10/2014 12:40 PM    Creatinine, urine 145.29 03/04/2017 05:01 AM         : Pippa Schroeder MD  6/2/2018        [facility]:  [email or web address]  [place] office:  [address], [address] [place] [address],[address]  [place], [address] [place] [address]  Phone: [phone]  Fax :     [phone]    [place] office:  [address]  Phone - [phone]  Fax - [phone]

## 2018-06-02 NOTE — PROGRESS NOTES
[facility]  [address] [facility], [place], [address] [place] [address]  [phone]      Medical Progress Note      NAME:         Volodymyr Curtis   :        1955  MRM:        [id number]    Date:          2018      Subjective: \"I still have pain\"     Pt seen and examined. Chart reviewed. Still c/o chest pain. Did require SL nitro overnight. Allergy to morphine     Objective:    Vital Signs:    Visit Vitals    /69 (BP 1 Location: Right arm, BP Patient Position: At rest)    Pulse 63    Temp 97.9 °F (36.6 °C)    Resp 16    Wt 149 kg (328 lb 7.8 oz)    SpO2 97%    BMI 47.13 kg/m2          Intake/Output Summary (Last 24 hours) at 18 1358  Last data filed at 18 2110   Gross per 24 hour   Intake             1035 ml   Output             1200 ml   Net             -165 ml        Physical Examination:    General:   Weak and ill looking female patient, not in any acute distress   Eyes:   pale conjunctivae, PERRLA with no discharge. ENT:   no ottorrhea or rhinorrhea with moist mucous membranes  Neck: no masses, thyroid non-tender and trachea central.  Pulm:  no accessory muscle use, decreased but clear breath sounds without crackles or wheezes  Card:  no JVD or murmurs, has regular and normal S1, S2 without thrills, bruits. + peripheral edema  Abd:  Soft, non-tender, obese, normoactive bowel sounds with no palpable organomegaly  Musc:  No cyanosis, clubbing, atrophy or deformities. Skin:  No rashes, bruising or ulcers. Neuro: Awake and alert.  Generally a non focal exam. Follows commands appropriately  Psych:  Has a fair insight and is oriented x 3    Current Facility-Administered Medications   Medication Dose Route Frequency    HYDROmorphone (DILAUDID) injection 0.5 mg  0.5 mg IntraVENous Q4H PRN    0.9% sodium chloride infusion 250 mL  250 mL IntraVENous PRN    heparin (porcine) injection 5,000 Units 5,000 Units SubCUTAneous Q8H    0.9% sodium chloride infusion 250 mL  250 mL IntraVENous PRN    bumetanide (BUMEX) tablet 2 mg  2 mg Oral BID    0.9% sodium chloride infusion 250 mL  250 mL IntraVENous PRN    nitroglycerin (NITROSTAT) tablet 0.4 mg  0.4 mg SubLINGual PRN    oxyCODONE-acetaminophen (PERCOCET 7.5) 7.5-325 mg per tablet 1 Tab  1 Tab Oral Q6H PRN    Warfarin: pharmacy to dose    Other Rx Dosing/Monitoring    amLODIPine (NORVASC) tablet 10 mg  10 mg Oral DAILY    albuterol (PROVENTIL VENTOLIN) nebulizer solution 2.5 mg  2.5 mg Nebulization Q6H PRN    allopurinol (ZYLOPRIM) tablet 100 mg  100 mg Oral DAILY    aspirin delayed-release tablet 81 mg  81 mg Oral DAILY    atorvastatin (LIPITOR) tablet 80 mg  80 mg Oral QHS    calcitRIOL (ROCALTROL) capsule 0.25 mcg  0.25 mcg Oral Q M, W, F & SAT    carvedilol (COREG) tablet 25 mg  25 mg Oral BID WITH MEALS    [START ON 6/5/2018] ergocalciferol (ERGOCALCIFEROL) capsule 50,000 Units  50,000 Units Oral every Tuesday    fluticasone (FLONASE) 50 mcg/actuation nasal spray 2 Spray  2 Spray Both Nostrils DAILY PRN    hydrOXYzine HCl (ATARAX) tablet 25 mg  25 mg Oral TID PRN    isosorbide mononitrate ER (IMDUR) tablet 120 mg  120 mg Oral DAILY    pantoprazole (PROTONIX) tablet 40 mg  40 mg Oral ACB    polyethylene glycol (MIRALAX) packet 17 g  17 g Oral DAILY    senna-docusate (PERICOLACE) 8.6-50 mg per tablet 1 Tab  1 Tab Oral DAILY    sodium chloride (NS) flush 5-10 mL  5-10 mL IntraVENous Q8H    sodium chloride (NS) flush 5-10 mL  5-10 mL IntraVENous PRN    naloxone (NARCAN) injection 0.4 mg  0.4 mg IntraVENous PRN    glucose chewable tablet 16 g  4 Tab Oral PRN    dextrose (D50W) injection syrg 12.5-25 g  12.5-25 g IntraVENous PRN    glucagon (GLUCAGEN) injection 1 mg  1 mg IntraMUSCular PRN    acetaminophen (TYLENOL) tablet 650 mg  650 mg Oral Q4H PRN    diphenhydrAMINE (BENADRYL) capsule 25 mg  25 mg Oral Q4H PRN    ondansetron [facility]) injection 4 mg  4 mg IntraVENous Q4H PRN    insulin lispro (HUMALOG) injection   SubCUTAneous AC&HS        Laboratory data and review:    Recent Labs      06/02/18   0355  06/01/18   0906  05/31/18   1055  05/31/18   0151   WBC  8.3  7.8   --   8.3   HGB  7.9*  7.5*  7.5*  7.4*   HCT  24.9*  23.6*  24.0*  24.2*   PLT  153  176   --   172     Recent Labs      06/02/18   0355  06/01/18   0906  06/01/18   0107  05/31/18   0151   NA  142  143   --   142   K  4.1  3.8   --   3.8   CL  108  107   --   107   CO2  24  24   --   24   GLU  112*  124*   --   160*   BUN  61*  57*   --   56*   CREA  3.70*  3.63*   --   3.61*   CA  8.5  8.3*   --   8.4*   MG  1.2*   --    --   1.0*   INR  1.4*   --   2.6*  3.8*     No components found for: GLPOC    Assessment and Plan:  Chest pain / Dyspnea POA: she has chronic anginal symptoms  -continue ASA, statin, coreg, Imdur   -plans for cardiac cath next week   -transfuse additional PRBC's today (6/2)     CAD (coronary Artery Disease) Native Artery / Chronic diastolic heart failure / HTN (hypertension): POA  -continue meds as above   -continue diuresis   -strict I's and O's   -daily weights      Morbid obesity / JASEN on CPAP:   -CPAP   -weight loss      Chronic respiratory failure with hypoxia / COPD, severe / ILD (interstitial lung disease) / Pulmonary HTN POA:  -continue O2    Gastroparesis / Esophageal reflux:   -PPI     Normocytic anemia POA:   -heme/onc on board   -s/p BMB; pathology pending      DM (diabetes mellitus), type 2 with renal, vascular and neurological complications POA:   -ISS   -BG checks AC TID and qHS      CKD (chronic kidney disease) stage 4 POA:   -nephrology on board; monitor     Hx of deep venous thrombosis / Warfarin anticoagulation:   -coumadin on hold; diagnosed with DVT in 6/2017  -will need to start heparin gtt as bridge      Gout: stable  -continue allopurinol     Total time spent for the patient's care: [address] [facility] discussed with: Patient, Care Manager and Nursing Staff    Discussed:  Care Plan and D/C Planning    Prophylaxis:  Heparin gtt     Anticipated Disposition:  Home w/Family           ___________________________________________________    Attending Physician:   Clement Urena MD

## 2018-06-02 NOTE — PROGRESS NOTES
Alex Dorsey MD. [facility] [place]              Patient: Jackie Staples  : 1955      Today's Date: 2018        CARDIOLOGY PROGRESS NOTE  S: Still with bouts of CP and SOB. No change. O:    Physical Exam:  Visit Vitals    /69 (BP 1 Location: Right arm, BP Patient Position: At rest)    Pulse 63    Temp 97.9 °F (36.6 °C)    Resp 16    Wt 328 lb 7.8 oz (149 kg)    SpO2 97%    BMI 47.13 kg/m2     Patient appears generally well, mood and affect are appropriate and pleasant. + obese   HEENT:  Hearing intact, non-icteric, normocephalic, atraumatic. Neck Exam: Supple   Lung Exam: Clear to auscultation, even breath sounds. Cardiac Exam: Regular rate and rhythm with no murmur  Abdomen: Soft, non-tender obese   Extremities: MAW, 2+ lower extremity edema. Psych: Appropriate affect  Neuro - Grossly intact        Review of Symptoms:  Constitutional: Negative for fever   HEENT: Negative for vision changes. Respiratory: Negative for productive cough  Cardiovascular: Negative for syncope    Gastrointestinal: + nausea   Genitourinary: Negative for dysuria  Skin: Negative for rash  Heme: No problems bleeding.   Neuro - no speech changes or focal weaknesses          Intake/Output Summary (Last 24 hours) at 18 1309  Last data filed at 18 2110   Gross per 24 hour   Intake             1035 ml   Output             1200 ml   Net             -165 ml           LABS / OTHER STUDIES:     Recent Results (from the past 24 hour(s))   GLUCOSE, POC    Collection Time: 18  4:51 PM   Result Value Ref Range    Glucose (POC) 183 (H) 65 - 100 mg/dL    Performed by Ivana Burt, POC    Collection Time: 18  8:52 PM   Result Value Ref Range    Glucose (POC) 140 (H) 65 - 100 mg/dL    Performed by Shraddha Pelayo (PCT)    PROTHROMBIN TIME + INR    Collection Time: 18  3:55 AM   Result Value Ref Range    INR 1.4 (H) 0.9 - 1.1      Prothrombin time 14.3 (H) 9.0 - 11.1 sec CBC WITH AUTOMATED DIFF    Collection Time: 06/02/18  3:55 AM   Result Value Ref Range    WBC 8.3 3.6 - 11.0 K/uL    RBC 2.62 (L) 3.80 - 5.20 M/uL    HGB 7.9 (L) 11.5 - 16.0 g/dL    HCT 24.9 (L) 35.0 - 47.0 %    MCV 95.0 80.0 - 99.0 FL    MCH 30.2 26.0 - 34.0 PG    MCHC 31.7 30.0 - 36.5 g/dL    RDW 17.5 (H) 11.5 - 14.5 %    PLATELET 162 609 - 772 K/uL    MPV 10.9 8.9 - 12.9 FL    NRBC 0.0 0  WBC    ABSOLUTE NRBC 0.00 0.00 - 0.01 K/uL    NEUTROPHILS 60 32 - 75 %    LYMPHOCYTES 28 12 - 49 %    MONOCYTES 7 5 - 13 %    EOSINOPHILS 5 0 - 7 %    BASOPHILS 0 0 - 1 %    IMMATURE GRANULOCYTES 0 0.0 - 0.5 %    ABS. NEUTROPHILS 5.0 1.8 - 8.0 K/UL    ABS. LYMPHOCYTES 2.3 0.8 - 3.5 K/UL    ABS. MONOCYTES 0.6 0.0 - 1.0 K/UL    ABS. EOSINOPHILS 0.4 0.0 - 0.4 K/UL    ABS. BASOPHILS 0.0 0.0 - 0.1 K/UL    ABS. IMM.  GRANS. 0.0 0.00 - 0.04 K/UL    DF AUTOMATED     METABOLIC PANEL, BASIC    Collection Time: 06/02/18  3:55 AM   Result Value Ref Range    Sodium 142 136 - 145 mmol/L    Potassium 4.1 3.5 - 5.1 mmol/L    Chloride 108 97 - 108 mmol/L    CO2 24 21 - 32 mmol/L    Anion gap 10 5 - 15 mmol/L    Glucose 112 (H) 65 - 100 mg/dL    BUN 61 (H) 6 - 20 MG/DL    Creatinine 3.70 (H) 0.55 - 1.02 MG/DL    BUN/Creatinine ratio 16 12 - 20      GFR est AA 15 (L) >60 ml/min/1.73m2    GFR est non-AA 12 (L) >60 ml/min/1.73m2    Calcium 8.5 8.5 - 10.1 MG/DL   MAGNESIUM    Collection Time: 06/02/18  3:55 AM   Result Value Ref Range    Magnesium 1.2 (L) 1.6 - 2.4 mg/dL   GLUCOSE, POC    Collection Time: 06/02/18  7:18 AM   Result Value Ref Range    Glucose (POC) 127 (H) 65 - 100 mg/dL    Performed by Kenny Trejo, POC    Collection Time: 06/02/18 11:17 AM   Result Value Ref Range    Glucose (POC) 196 (H) 65 - 100 mg/dL    Performed by Nadya Taumatropo Animation                CARDIAC DIAGNOSTICS:     Cardiac Evaluation Includes:      CATH: 2004: 1v CAD by cath - PCI OM with SAL  CATH 5/2004 - patent stent, no new disease   Lexiscan cardiolite 12/09- normal perfusion, EF 66%  ECHO 11/2009: LVH, EF 80%, diastolic dysfunction  STRESS/LEXISCAN/CARDIOLITE 3/29/11: normal perfusion, EF 62%  CATH: 3/20/2012 :PA 55/30 mean 41, wedge 26, RA 24, EDP 35, LVG 55%, LM normal, LAD normal, LCX - OM1 patent stent, OM2 prox 85% long, % w/ L -> R collateral; s/p SAL-> OM2/OM3 with SAL. CATH: 10/4/2012: EDP 25, EF 55%, LM nl, LAD mild plaque, LCX patent OM stents, % prox with good L to R collaterals. Cath 3/18/2014 - RA 9 PA 42/19/29, PCW 12, EDP 25, no LVG due to CKD, LM nl, LAD mild plaque, LCX patent stents OM1/OM2, RCA chronic proximal occlusion with L to R collaterals. ECHO 4/11/16: EF 60-65%. No WMA. LA mildly dilated. Lexiscan Cardiolite 4/11/16: Normal. LVEF 55%. Compared to 3/29/11, no significant changes. RHC/CardioMEMS implant 8/31/17 - RA 12, PA 56/20/30, CI (Mayte) 2.65  Echo 11/30/17 - EF 65%. G1DD. No WMA. Wall thickness was mildly to moderately increased. Limited Echo 12/13/17 - Tricuspid valve: Pulmonary artery diastolic pressure: 08.49 mmHg. EKG 5/30/18- Normal sinus rhythm, Possible Left atrial enlargement, Low voltage QRS         ASSESSMENT AND PLAN:     Assessment and Plan:  Ms. Uzma Gonsales is a 64 yo with Chronic HFpEF, CAD, chronic lung disease, chronic anemia, CKD. Admitted with worsening \"angina\", normal trops. Likely demand ischemia. Plan for cath Tuesday. 1) Chronic recurrent anginal chest pain, exertional and non exertional.  - CAD with 1v disease/RCA    - [facility] cath scheduled for Tuesday to re-assess coronary arteries      2) Chronic HFpEF, class III, s/p CardioMEMS for PA monitoring:   - RHC on Tuesday to re-calibrate cardiomems  - cont Bumex       3) CKD  - stable.   - Nephrology following, appreciate recommendations  - she will need to stay 1 day post cath to monitor renal function  - she does have an AV fistula     4) Chronic severe anemia, refractory to Procrit:   - hematology following      5) Chronic anticoagulation for DVT   - s/p Vit K and FFP (prior to BM biopsy)   - will need to continue to hold coumadin for cardiac cath on Monday  - DVT prophylaxis per medicine team                Radha Bonilla MD, [place] [address]  [address] [facility], [address]  [address] [place] [address]. [address], [address] [place] [address]  Ph: [phone]   Ph [phone]

## 2018-06-02 NOTE — PROGRESS NOTES
Titrate heparin drip order been delayed due to blood transfusion and limited venous access. Dr. Chris Mcdowell notified. Ordered to start when blood completed.

## 2018-06-02 NOTE — PROGRESS NOTES
Problem: Falls - Risk of  Goal: *Absence of Falls  Document Sarah Fall Risk and appropriate interventions in the flowsheet. Outcome: Progressing Towards Goal  Fall Risk Interventions:  Mobility Interventions: Bed/chair exit alarm     1900- Bedside and Verbal shift change report given to [address] [facility]  (oncoming nurse) by Christy De La Torre RN  (offgoing nurse). Report included the following information SBAR, Kardex and Recent Results. ..     0700- Bedside and Verbal shift change report given to [address] [facility]  (oncoming nurse) by Tom Prescott RN  (offgoing nurse). Report included the following information SBAR, Kardex and Recent Results. ..

## 2018-06-03 LAB
ABO + RH BLD: NORMAL
ANION GAP SERPL CALC-SCNC: 10 MMOL/L (ref 5–15)
APTT PPP: 42.1 SEC (ref 22.1–32)
APTT PPP: 55.8 SEC (ref 22.1–32)
APTT PPP: 80.9 SEC (ref 22.1–32)
APTT PPP: >130 SEC (ref 22.1–32)
BASOPHILS # BLD: 0 K/UL (ref 0–0.1)
BASOPHILS NFR BLD: 0 % (ref 0–1)
BLD PROD TYP BPU: NORMAL
BLOOD GROUP ANTIBODIES SERPL: NORMAL
BPU ID: NORMAL
BUN SERPL-MCNC: 66 MG/DL (ref 6–20)
BUN/CREAT SERPL: 18 (ref 12–20)
CALCIUM SERPL-MCNC: 8.8 MG/DL (ref 8.5–10.1)
CHLORIDE SERPL-SCNC: 106 MMOL/L (ref 97–108)
CO2 SERPL-SCNC: 25 MMOL/L (ref 21–32)
CREAT SERPL-MCNC: 3.75 MG/DL (ref 0.55–1.02)
CROSSMATCH RESULT,%XM: NORMAL
DIFFERENTIAL METHOD BLD: ABNORMAL
EOSINOPHIL # BLD: 0.6 K/UL (ref 0–0.4)
EOSINOPHIL NFR BLD: 6 % (ref 0–7)
ERYTHROCYTE [DISTWIDTH] IN BLOOD BY AUTOMATED COUNT: 17.2 % (ref 11.5–14.5)
GLUCOSE BLD STRIP.AUTO-MCNC: 133 MG/DL (ref 65–100)
GLUCOSE BLD STRIP.AUTO-MCNC: 138 MG/DL (ref 65–100)
GLUCOSE BLD STRIP.AUTO-MCNC: 140 MG/DL (ref 65–100)
GLUCOSE BLD STRIP.AUTO-MCNC: 155 MG/DL (ref 65–100)
GLUCOSE SERPL-MCNC: 121 MG/DL (ref 65–100)
HCT VFR BLD AUTO: 28.1 % (ref 35–47)
HGB BLD-MCNC: 8.8 G/DL (ref 11.5–16)
IMM GRANULOCYTES # BLD: 0 K/UL (ref 0–0.04)
IMM GRANULOCYTES NFR BLD AUTO: 0 % (ref 0–0.5)
INR PPP: 1.3 (ref 0.9–1.1)
LYMPHOCYTES # BLD: 2.6 K/UL (ref 0.8–3.5)
LYMPHOCYTES NFR BLD: 29 % (ref 12–49)
MCH RBC QN AUTO: 29.5 PG (ref 26–34)
MCHC RBC AUTO-ENTMCNC: 31.3 G/DL (ref 30–36.5)
MCV RBC AUTO: 94.3 FL (ref 80–99)
MONOCYTES # BLD: 0.6 K/UL (ref 0–1)
MONOCYTES NFR BLD: 7 % (ref 5–13)
NEUTS SEG # BLD: 5.1 K/UL (ref 1.8–8)
NEUTS SEG NFR BLD: 57 % (ref 32–75)
NRBC # BLD: 0 K/UL (ref 0–0.01)
NRBC BLD-RTO: 0 PER 100 WBC
PLATELET # BLD AUTO: 152 K/UL (ref 150–400)
PMV BLD AUTO: 10.5 FL (ref 8.9–12.9)
POTASSIUM SERPL-SCNC: 4.2 MMOL/L (ref 3.5–5.1)
PROTHROMBIN TIME: 13.2 SEC (ref 9–11.1)
RBC # BLD AUTO: 2.98 M/UL (ref 3.8–5.2)
SERVICE CMNT-IMP: ABNORMAL
SODIUM SERPL-SCNC: 141 MMOL/L (ref 136–145)
SPECIMEN EXP DATE BLD: NORMAL
STATUS OF UNIT,%ST: NORMAL
THERAPEUTIC RANGE,PTTT: ABNORMAL SECS (ref 58–77)
UNIT DIVISION, %UDIV: 0
WBC # BLD AUTO: 9 K/UL (ref 3.6–11)

## 2018-06-03 PROCEDURE — 74011250637 HC RX REV CODE- 250/637: Performed by: INTERNAL MEDICINE

## 2018-06-03 PROCEDURE — 82962 GLUCOSE BLOOD TEST: CPT

## 2018-06-03 PROCEDURE — 85025 COMPLETE CBC W/AUTO DIFF WBC: CPT | Performed by: INTERNAL MEDICINE

## 2018-06-03 PROCEDURE — 85730 THROMBOPLASTIN TIME PARTIAL: CPT | Performed by: INTERNAL MEDICINE

## 2018-06-03 PROCEDURE — 36415 COLL VENOUS BLD VENIPUNCTURE: CPT | Performed by: INTERNAL MEDICINE

## 2018-06-03 PROCEDURE — 65660000000 HC RM CCU STEPDOWN

## 2018-06-03 PROCEDURE — 94660 CPAP INITIATION&MGMT: CPT

## 2018-06-03 PROCEDURE — 74011250637 HC RX REV CODE- 250/637: Performed by: NURSE PRACTITIONER

## 2018-06-03 PROCEDURE — 80048 BASIC METABOLIC PNL TOTAL CA: CPT | Performed by: INTERNAL MEDICINE

## 2018-06-03 PROCEDURE — 85610 PROTHROMBIN TIME: CPT | Performed by: INTERNAL MEDICINE

## 2018-06-03 PROCEDURE — 74011250636 HC RX REV CODE- 250/636: Performed by: INTERNAL MEDICINE

## 2018-06-03 RX ORDER — HEPARIN SODIUM 5000 [USP'U]/ML
5000 INJECTION, SOLUTION INTRAVENOUS; SUBCUTANEOUS ONCE
Status: COMPLETED | OUTPATIENT
Start: 2018-06-03 | End: 2018-06-03

## 2018-06-03 RX ADMIN — HEPARIN SODIUM 5000 UNITS: 5000 INJECTION, SOLUTION INTRAVENOUS; SUBCUTANEOUS at 15:06

## 2018-06-03 RX ADMIN — ALLOPURINOL 100 MG: 100 TABLET ORAL at 08:15

## 2018-06-03 RX ADMIN — HYDROMORPHONE HYDROCHLORIDE 0.5 MG: 2 INJECTION INTRAMUSCULAR; INTRAVENOUS; SUBCUTANEOUS at 18:06

## 2018-06-03 RX ADMIN — CARVEDILOL 25 MG: 12.5 TABLET, FILM COATED ORAL at 08:17

## 2018-06-03 RX ADMIN — ASPIRIN 81 MG: 81 TABLET, COATED ORAL at 08:15

## 2018-06-03 RX ADMIN — Medication 10 ML: at 14:00

## 2018-06-03 RX ADMIN — HEPARIN SODIUM AND DEXTROSE 8 UNITS/KG/HR: 10000; 5 INJECTION INTRAVENOUS at 11:42

## 2018-06-03 RX ADMIN — OXYCODONE HYDROCHLORIDE AND ACETAMINOPHEN 1 TABLET: 7.5; 325 TABLET ORAL at 08:20

## 2018-06-03 RX ADMIN — HYDROMORPHONE HYDROCHLORIDE 0.5 MG: 2 INJECTION INTRAMUSCULAR; INTRAVENOUS; SUBCUTANEOUS at 08:11

## 2018-06-03 RX ADMIN — BUMETANIDE 2 MG: 1 TABLET ORAL at 18:05

## 2018-06-03 RX ADMIN — Medication 10 ML: at 05:27

## 2018-06-03 RX ADMIN — Medication 10 ML: at 23:26

## 2018-06-03 RX ADMIN — STANDARDIZED SENNA CONCENTRATE AND DOCUSATE SODIUM 1 TABLET: 8.6; 5 TABLET, FILM COATED ORAL at 08:15

## 2018-06-03 RX ADMIN — HYDROMORPHONE HYDROCHLORIDE 0.5 MG: 2 INJECTION INTRAMUSCULAR; INTRAVENOUS; SUBCUTANEOUS at 04:10

## 2018-06-03 RX ADMIN — ISOSORBIDE MONONITRATE 120 MG: 60 TABLET ORAL at 08:15

## 2018-06-03 RX ADMIN — PANTOPRAZOLE SODIUM 40 MG: 40 TABLET, DELAYED RELEASE ORAL at 08:15

## 2018-06-03 RX ADMIN — HEPARIN SODIUM AND DEXTROSE 7 UNITS/KG/HR: 10000; 5 INJECTION INTRAVENOUS at 23:23

## 2018-06-03 RX ADMIN — CARVEDILOL 25 MG: 12.5 TABLET, FILM COATED ORAL at 18:06

## 2018-06-03 RX ADMIN — POLYETHYLENE GLYCOL (3350) 17 G: 17 POWDER, FOR SOLUTION ORAL at 08:18

## 2018-06-03 RX ADMIN — OXYCODONE HYDROCHLORIDE AND ACETAMINOPHEN 1 TABLET: 7.5; 325 TABLET ORAL at 19:22

## 2018-06-03 RX ADMIN — BUMETANIDE 2 MG: 1 TABLET ORAL at 08:15

## 2018-06-03 RX ADMIN — AMLODIPINE BESYLATE 10 MG: 5 TABLET ORAL at 08:17

## 2018-06-03 RX ADMIN — NITROGLYCERIN 0.4 MG: 0.4 TABLET SUBLINGUAL at 15:47

## 2018-06-03 RX ADMIN — HYDROMORPHONE HYDROCHLORIDE 0.5 MG: 2 INJECTION INTRAMUSCULAR; INTRAVENOUS; SUBCUTANEOUS at 22:11

## 2018-06-03 RX ADMIN — NITROGLYCERIN 0.4 MG: 0.4 TABLET SUBLINGUAL at 15:53

## 2018-06-03 RX ADMIN — HYDROMORPHONE HYDROCHLORIDE 0.5 MG: 2 INJECTION INTRAMUSCULAR; INTRAVENOUS; SUBCUTANEOUS at 12:56

## 2018-06-03 RX ADMIN — ATORVASTATIN CALCIUM 80 MG: 20 TABLET, FILM COATED ORAL at 22:12

## 2018-06-03 NOTE — PROGRESS NOTES
Adolph Maurer MD. [facility]              Patient: Cody Kelley  : 1955      Today's Date: 6/3/2018      CARDIOLOGY PROGRESS NOTE  S: A little more energy today. Still with LARABonita Brock:  Visit Vitals    /74    Pulse 69    Temp 97.6 °F (36.4 °C)    Resp 16    Wt 334 lb (151.5 kg)    SpO2 95%    BMI 47.92 kg/m2     Patient appears generally well, mood and affect are appropriate and pleasant. + obese   HEENT:  Hearing intact, non-icteric, normocephalic, atraumatic. Neck Exam: Supple   Lung Exam: Clear to auscultation, even breath sounds. Cardiac Exam: Regular rate and rhythm with no murmur  Abdomen: Soft, non-tender obese   Extremities: MAW, 2+ lower extremity edema. Psych: Appropriate affect  Neuro - Grossly intact           Review of Symptoms:  Constitutional: Negative for fever   HEENT: Negative for vision changes. Respiratory: Negative for productive cough  Cardiovascular: Negative for syncope    Gastrointestinal: + nausea   Genitourinary: Negative for dysuria  Skin: Negative for rash  Heme: No problems bleeding.   Neuro - no speech changes or focal weaknesses        Intake/Output Summary (Last 24 hours) at 18 1323  Last data filed at 18 0609   Gross per 24 hour   Intake           678.41 ml   Output              800 ml   Net          -121.59 ml         LABS / OTHER STUDIES:     Recent Results (from the past 24 hour(s))   GLUCOSE, POC    Collection Time: 18  4:22 PM   Result Value Ref Range    Glucose (POC) 134 (H) 65 - 100 mg/dL    Performed by David Juan    PTT    Collection Time: 18  9:14 PM   Result Value Ref Range    aPTT 60.0 (H) 22.1 - 32.0 sec    aPTT, therapeutic range     58.0 - 77.0 SECS   CBC WITH AUTOMATED DIFF    Collection Time: 18  3:42 AM   Result Value Ref Range    WBC 9.0 3.6 - 11.0 K/uL    RBC 2.98 (L) 3.80 - 5.20 M/uL    HGB 8.8 (L) 11.5 - 16.0 g/dL    HCT 28.1 (L) 35.0 - 47.0 %    MCV 94.3 80.0 - 99.0 FL    MCH 29.5 26.0 - 34.0 PG    MCHC 31.3 30.0 - 36.5 g/dL    RDW 17.2 (H) 11.5 - 14.5 %    PLATELET 923 330 - 557 K/uL    MPV 10.5 8.9 - 12.9 FL    NRBC 0.0 0  WBC    ABSOLUTE NRBC 0.00 0.00 - 0.01 K/uL    NEUTROPHILS 57 32 - 75 %    LYMPHOCYTES 29 12 - 49 %    MONOCYTES 7 5 - 13 %    EOSINOPHILS 6 0 - 7 %    BASOPHILS 0 0 - 1 %    IMMATURE GRANULOCYTES 0 0.0 - 0.5 %    ABS. NEUTROPHILS 5.1 1.8 - 8.0 K/UL    ABS. LYMPHOCYTES 2.6 0.8 - 3.5 K/UL    ABS. MONOCYTES 0.6 0.0 - 1.0 K/UL    ABS. EOSINOPHILS 0.6 (H) 0.0 - 0.4 K/UL    ABS. BASOPHILS 0.0 0.0 - 0.1 K/UL    ABS. IMM.  GRANS. 0.0 0.00 - 0.04 K/UL    DF AUTOMATED     PTT    Collection Time: 06/03/18  3:42 AM   Result Value Ref Range    aPTT 80.9 (H) 22.1 - 32.0 sec    aPTT, therapeutic range     58.0 - 80.6 SECS   METABOLIC PANEL, BASIC    Collection Time: 06/03/18  3:42 AM   Result Value Ref Range    Sodium 141 136 - 145 mmol/L    Potassium 4.2 3.5 - 5.1 mmol/L    Chloride 106 97 - 108 mmol/L    CO2 25 21 - 32 mmol/L    Anion gap 10 5 - 15 mmol/L    Glucose 121 (H) 65 - 100 mg/dL    BUN 66 (H) 6 - 20 MG/DL    Creatinine 3.75 (H) 0.55 - 1.02 MG/DL    BUN/Creatinine ratio 18 12 - 20      GFR est AA 15 (L) >60 ml/min/1.73m2    GFR est non-AA 12 (L) >60 ml/min/1.73m2    Calcium 8.8 8.5 - 10.1 MG/DL   GLUCOSE, POC    Collection Time: 06/03/18  7:29 AM   Result Value Ref Range    Glucose (POC) 140 (H) 65 - 100 mg/dL    Performed by Waqar Gibbs (PCT)    GLUCOSE, POC    Collection Time: 06/03/18 11:53 AM   Result Value Ref Range    Glucose (POC) 155 (H) 65 - 100 mg/dL    Performed by Waqar Gibbs (PCT)           CARDIAC DIAGNOSTICS:      Cardiac Evaluation Includes:        CATH: 2004: 1v CAD by cath - PCI OM with SAL  CATH 5/2004 - patent stent, no new disease   Lexiscan cardiolite 12/09- normal perfusion, EF 66%  ECHO 11/2009: LVH, EF 02%, diastolic dysfunction  STRESS/LEXISCAN/CARDIOLITE 3/29/11: normal perfusion, EF 62%  CATH: 3/20/2012 :PA 55/30 mean 41, wedge 26, RA 24, EDP 35, LVG 55%, LM normal, LAD normal, LCX - OM1 patent stent, OM2 prox 85% long, % w/ L -> R collateral; s/p SAL-> OM2/OM3 with SAL. CATH: 10/4/2012: EDP 25, EF 55%, LM nl, LAD mild plaque, LCX patent OM stents, % prox with good L to R collaterals. Cath 3/18/2014 - RA 9 PA 42/19/29, PCW 12, EDP 25, no LVG due to CKD, LM nl, LAD mild plaque, LCX patent stents OM1/OM2, RCA chronic proximal occlusion with L to R collaterals. ECHO 4/11/16: EF 60-65%. No WMA. LA mildly dilated. Lexiscan Cardiolite 4/11/16: Normal. LVEF 55%. Compared to 3/29/11, no significant changes. RHC/CardioMEMS implant 8/31/17 - RA 12, PA 56/20/30, CI (Mayte) 2.65  Echo 11/30/17 - EF 65%. G1DD. No WMA. Wall thickness was mildly to moderately increased. Limited Echo 12/13/17 - Tricuspid valve: Pulmonary artery diastolic pressure: 68.88 mmHg.        EKG 5/30/18- Normal sinus rhythm, Possible Left atrial enlargement, Low voltage QRS            ASSESSMENT AND PLAN:      Assessment and Plan:  Ms. Cari Jones is a 62 yo with Chronic HFpEF, CAD, chronic lung disease, chronic anemia, CKD. Admitted with worsening \"angina\", normal trops. Likely demand ischemia. Plan for cath Tuesday.    1) Chronic recurrent anginal chest pain, exertional and non exertional.  - CAD with 1v disease/RCA    - [facility] cath scheduled for Tuesday to re-assess coronary arteries       2) Chronic HFpEF, class III, s/p CardioMEMS for PA monitoring:   - RHC on Tuesday to re-calibrate cardiomems  - cont Bumex       3) CKD  - stable.   - Nephrology following, appreciate recommendations  - she will need to stay 1 day post cath to monitor renal function  - she does have an AV fistula      4) Chronic severe anemia, refractory to Procrit:   - hematology following       5) Chronic anticoagulation for DVT   - s/p Vit K and FFP (prior to BM biopsy)   - will need to continue to hold coumadin for cardiac cath on Tuesday  - On IV heparin                     Gracia Peña MD, [address], [address] [place] [address]  Ph: [phone]                                                              [phone]

## 2018-06-03 NOTE — PROGRESS NOTES
Ezio Craig [facility] [address]  [address] [facility], [place], [address] [facility]  [phone]      Medical Progress Note      NAME:         Bryce Carey   :        1955  MRM:        [id number]    Date:          6/3/2018      Subjective: \"The dilaudid helps\"     Pt seen and examined. Chart reviewed. Still c/o chest pain but improved with PRBC's and IV dilaudid     Objective:    Vital Signs:    Visit Vitals    /73    Pulse 77    Temp 98 °F (36.7 °C)    Resp 19    Wt 151.5 kg (334 lb)    SpO2 96%    BMI 47.92 kg/m2          Intake/Output Summary (Last 24 hours) at 18 1143  Last data filed at 18 8999   Gross per 24 hour   Intake           678.41 ml   Output              800 ml   Net          -121.59 ml        Physical Examination:    General:   Weak and ill looking female patient, not in any acute distress   Eyes:   pale conjunctivae, PERRLA with no discharge. ENT:   no ottorrhea or rhinorrhea with moist mucous membranes  Neck: no masses, thyroid non-tender and trachea central.  Pulm:  no accessory muscle use, decreased but clear breath sounds without crackles or wheezes  Card:  no JVD or murmurs, has regular and normal S1, S2 without thrills, bruits. + peripheral edema  Abd:  Soft, non-tender, obese, normoactive bowel sounds with no palpable organomegaly  Musc:  No cyanosis, clubbing, atrophy or deformities. Skin:  No rashes, bruising or ulcers. Neuro: Awake and alert.  Generally a non focal exam. Follows commands appropriately  Psych:  Has a fair insight and is oriented x 3    Current Facility-Administered Medications   Medication Dose Route Frequency    HYDROmorphone (DILAUDID) injection 0.5 mg  0.5 mg IntraVENous Q4H PRN    0.9% sodium chloride infusion 250 mL  250 mL IntraVENous PRN    heparin 25,000 units in D5W 250 ml infusion  10-36 Units/kg/hr IntraVENous TITRATE    bumetanide (BUMEX) tablet 2 mg  2 mg Oral BID    nitroglycerin (NITROSTAT) tablet 0.4 mg  0.4 mg SubLINGual PRN    oxyCODONE-acetaminophen (PERCOCET 7.5) 7.5-325 mg per tablet 1 Tab  1 Tab Oral Q6H PRN    Warfarin: pharmacy to dose    Other Rx Dosing/Monitoring    amLODIPine (NORVASC) tablet 10 mg  10 mg Oral DAILY    albuterol (PROVENTIL VENTOLIN) nebulizer solution 2.5 mg  2.5 mg Nebulization Q6H PRN    allopurinol (ZYLOPRIM) tablet 100 mg  100 mg Oral DAILY    aspirin delayed-release tablet 81 mg  81 mg Oral DAILY    atorvastatin (LIPITOR) tablet 80 mg  80 mg Oral QHS    calcitRIOL (ROCALTROL) capsule 0.25 mcg  0.25 mcg Oral Q M, W, F & SAT    carvedilol (COREG) tablet 25 mg  25 mg Oral BID WITH MEALS    [START ON 6/5/2018] ergocalciferol (ERGOCALCIFEROL) capsule 50,000 Units  50,000 Units Oral every Tuesday    fluticasone (FLONASE) 50 mcg/actuation nasal spray 2 Spray  2 Spray Both Nostrils DAILY PRN    hydrOXYzine HCl (ATARAX) tablet 25 mg  25 mg Oral TID PRN    isosorbide mononitrate ER (IMDUR) tablet 120 mg  120 mg Oral DAILY    pantoprazole (PROTONIX) tablet 40 mg  40 mg Oral ACB    polyethylene glycol (MIRALAX) packet 17 g  17 g Oral DAILY    senna-docusate (PERICOLACE) 8.6-50 mg per tablet 1 Tab  1 Tab Oral DAILY    sodium chloride (NS) flush 5-10 mL  5-10 mL IntraVENous Q8H    sodium chloride (NS) flush 5-10 mL  5-10 mL IntraVENous PRN    naloxone (NARCAN) injection 0.4 mg  0.4 mg IntraVENous PRN    glucose chewable tablet 16 g  4 Tab Oral PRN    dextrose (D50W) injection syrg 12.5-25 g  12.5-25 g IntraVENous PRN    glucagon (GLUCAGEN) injection 1 mg  1 mg IntraMUSCular PRN    acetaminophen (TYLENOL) tablet 650 mg  650 mg Oral Q4H PRN    diphenhydrAMINE (BENADRYL) capsule 25 mg  25 mg Oral Q4H PRN    ondansetron (ZOFRAN) injection 4 mg  4 mg IntraVENous Q4H PRN    insulin lispro (HUMALOG) injection   SubCUTAneous AC&HS        Laboratory data and review:    Recent Labs      06/03/18   0342  06/02/18   4389 06/01/18   0906   WBC  9.0  8.3  7.8   HGB  8.8*  7.9*  7.5*   HCT  28.1*  24.9*  23.6*   PLT  152  153  176     Recent Labs      06/03/18   0342  06/02/18   0355  06/01/18   0906  06/01/18   0107   NA  141  142  143   --    K  4.2  4.1  3.8   --    CL  106  108  107   --    CO2  25  24  24   --    GLU  121*  112*  124*   --    BUN  66*  61*  57*   --    CREA  3.75*  3.70*  3.63*   --    CA  8.8  8.5  8.3*   --    MG   --   1.2*   --    --    INR   --   1.4*   --   2.6*     No components found for: GLPOC    Assessment and Plan:  Chest pain / Dyspnea POA: she has chronic anginal symptoms  -continue ASA, statin, coreg, Imdur   -plans for cardiac cath next week   -start IV dilaudid (allergy to morphine)    CAD (coronary Artery Disease) Native Artery / Chronic diastolic heart failure / HTN (hypertension): POA  -continue meds as above   -continue diuresis   -strict I's and O's   -daily weights   -hemoglobin optimized      Morbid obesity / JASEN on CPAP:   -CPAP   -weight loss      Chronic respiratory failure with hypoxia / COPD, severe / ILD (interstitial lung disease) / Pulmonary HTN POA:  -continue O2    Gastroparesis / Esophageal reflux:   -PPI     Normocytic anemia POA:   -heme/onc on board   -s/p BMB; pathology pending      DM (diabetes mellitus), type 2 with renal, vascular and neurological complications POA:   -ISS   -BG checks AC TID and qHS      CKD (chronic kidney disease) stage 4 POA:   -nephrology on board; monitor     Hx of deep venous thrombosis / Warfarin anticoagulation:   -coumadin on hold; diagnosed with DVT in 6/2017  -continue heparin gtt      Gout: stable  -continue allopurinol     Total time spent for the patient's care: [address] discussed with: Patient, Care Manager and Nursing Staff    Discussed:  Care Plan and D/C Planning    Prophylaxis:  Heparin gtt     Anticipated Disposition:  Home w/Family           ___________________________________________________    Attending Physician:   Ori Darby MD

## 2018-06-03 NOTE — PROGRESS NOTES
2000- Lab called me the ptt is more 120sec. Bassem Nguyen REDUE  the PTT  NOW. .    2115- ptt to lab. .    2145- PTT is 60 sec. Gae Notch Not changed in rate. . Will PTT is due 0330. Gae Notch 0500- Heparin rate has changed to 8units /hr. Bassem Notch PTT is due @ 1100am..    1900- Bedside and Verbal shift change report given to [address] [place] [address]  (oncoming nurse) by Beverly Miner RN  (offgoing nurse). Report included the following information SBAR, Kardex and Recent Results. 0700- Bedside and Verbal shift change report given to Beverly Miner RN  (oncoming nurse) by Igor Sunshine RN  (offgoing nurse). Report included the following information SBAR, Kardex and Recent Results. ..

## 2018-06-03 NOTE — PROGRESS NOTES
Problem: Falls - Risk of  Goal: *Absence of Falls  Document Sarah Fall Risk and appropriate interventions in the flowsheet.    Outcome: Progressing Towards Goal  Fall Risk Interventions:  Mobility Interventions: Bed/chair exit alarm

## 2018-06-03 NOTE — PROGRESS NOTES
PTT attempted multiple times yet no success. Informed Dr. Samara Montes that midline team not on call until tomorrow. Will request help from ICU.

## 2018-06-04 ENCOUNTER — APPOINTMENT (OUTPATIENT)
Dept: GENERAL RADIOLOGY | Age: 63
DRG: 193 | End: 2018-06-04
Attending: RADIOLOGY
Payer: MEDICARE

## 2018-06-04 ENCOUNTER — APPOINTMENT (OUTPATIENT)
Dept: INTERVENTIONAL RADIOLOGY/VASCULAR | Age: 63
DRG: 193 | End: 2018-06-04
Attending: INTERNAL MEDICINE
Payer: MEDICARE

## 2018-06-04 LAB
ANION GAP SERPL CALC-SCNC: 13 MMOL/L (ref 5–15)
APTT PPP: 39.9 SEC (ref 22.1–32)
APTT PPP: 41.2 SEC (ref 22.1–32)
APTT PPP: >130 SEC (ref 22.1–32)
BASOPHILS # BLD: 0 K/UL (ref 0–0.1)
BASOPHILS NFR BLD: 0 % (ref 0–1)
BUN SERPL-MCNC: 70 MG/DL (ref 6–20)
BUN/CREAT SERPL: 18 (ref 12–20)
CALCIUM SERPL-MCNC: 9.1 MG/DL (ref 8.5–10.1)
CHLORIDE SERPL-SCNC: 105 MMOL/L (ref 97–108)
CO2 SERPL-SCNC: 22 MMOL/L (ref 21–32)
CREAT SERPL-MCNC: 3.83 MG/DL (ref 0.55–1.02)
DIFFERENTIAL METHOD BLD: ABNORMAL
EOSINOPHIL # BLD: 0.6 K/UL (ref 0–0.4)
EOSINOPHIL NFR BLD: 7 % (ref 0–7)
ERYTHROCYTE [DISTWIDTH] IN BLOOD BY AUTOMATED COUNT: 16.8 % (ref 11.5–14.5)
GLUCOSE BLD STRIP.AUTO-MCNC: 133 MG/DL (ref 65–100)
GLUCOSE BLD STRIP.AUTO-MCNC: 143 MG/DL (ref 65–100)
GLUCOSE BLD STRIP.AUTO-MCNC: 198 MG/DL (ref 65–100)
GLUCOSE BLD STRIP.AUTO-MCNC: 202 MG/DL (ref 65–100)
GLUCOSE SERPL-MCNC: 122 MG/DL (ref 65–100)
HCT VFR BLD AUTO: 27.4 % (ref 35–47)
HGB BLD-MCNC: 8.6 G/DL (ref 11.5–16)
IMM GRANULOCYTES # BLD: 0 K/UL (ref 0–0.04)
IMM GRANULOCYTES NFR BLD AUTO: 0 % (ref 0–0.5)
INR PPP: 1.3 (ref 0.9–1.1)
LYMPHOCYTES # BLD: 2.2 K/UL (ref 0.8–3.5)
LYMPHOCYTES NFR BLD: 26 % (ref 12–49)
MAGNESIUM SERPL-MCNC: 1.4 MG/DL (ref 1.6–2.4)
MCH RBC QN AUTO: 29.5 PG (ref 26–34)
MCHC RBC AUTO-ENTMCNC: 31.4 G/DL (ref 30–36.5)
MCV RBC AUTO: 93.8 FL (ref 80–99)
MONOCYTES # BLD: 0.8 K/UL (ref 0–1)
MONOCYTES NFR BLD: 9 % (ref 5–13)
NEUTS SEG # BLD: 5 K/UL (ref 1.8–8)
NEUTS SEG NFR BLD: 58 % (ref 32–75)
NRBC # BLD: 0 K/UL (ref 0–0.01)
NRBC BLD-RTO: 0 PER 100 WBC
PLATELET # BLD AUTO: 164 K/UL (ref 150–400)
PMV BLD AUTO: 11 FL (ref 8.9–12.9)
POTASSIUM SERPL-SCNC: 4.4 MMOL/L (ref 3.5–5.1)
PROTHROMBIN TIME: 13.1 SEC (ref 9–11.1)
RBC # BLD AUTO: 2.92 M/UL (ref 3.8–5.2)
SERVICE CMNT-IMP: ABNORMAL
SODIUM SERPL-SCNC: 140 MMOL/L (ref 136–145)
THERAPEUTIC RANGE,PTTT: ABNORMAL SECS (ref 58–77)
WBC # BLD AUTO: 8.6 K/UL (ref 3.6–11)

## 2018-06-04 PROCEDURE — 74011250637 HC RX REV CODE- 250/637: Performed by: INTERNAL MEDICINE

## 2018-06-04 PROCEDURE — C1751 CATH, INF, PER/CENT/MIDLINE: HCPCS

## 2018-06-04 PROCEDURE — 85610 PROTHROMBIN TIME: CPT | Performed by: INTERNAL MEDICINE

## 2018-06-04 PROCEDURE — 65660000000 HC RM CCU STEPDOWN

## 2018-06-04 PROCEDURE — 82962 GLUCOSE BLOOD TEST: CPT

## 2018-06-04 PROCEDURE — 77030020847 HC STATLOK BARD -A

## 2018-06-04 PROCEDURE — 71045 X-RAY EXAM CHEST 1 VIEW: CPT

## 2018-06-04 PROCEDURE — 36415 COLL VENOUS BLD VENIPUNCTURE: CPT | Performed by: INTERNAL MEDICINE

## 2018-06-04 PROCEDURE — 77010033678 HC OXYGEN DAILY

## 2018-06-04 PROCEDURE — 85730 THROMBOPLASTIN TIME PARTIAL: CPT | Performed by: INTERNAL MEDICINE

## 2018-06-04 PROCEDURE — 74011250636 HC RX REV CODE- 250/636: Performed by: INTERNAL MEDICINE

## 2018-06-04 PROCEDURE — 76937 US GUIDE VASCULAR ACCESS: CPT

## 2018-06-04 PROCEDURE — 80048 BASIC METABOLIC PNL TOTAL CA: CPT | Performed by: INTERNAL MEDICINE

## 2018-06-04 PROCEDURE — 94660 CPAP INITIATION&MGMT: CPT

## 2018-06-04 PROCEDURE — 83735 ASSAY OF MAGNESIUM: CPT | Performed by: INTERNAL MEDICINE

## 2018-06-04 PROCEDURE — 74011000250 HC RX REV CODE- 250: Performed by: RADIOLOGY

## 2018-06-04 PROCEDURE — 85730 THROMBOPLASTIN TIME PARTIAL: CPT | Performed by: SPECIALIST

## 2018-06-04 PROCEDURE — 85025 COMPLETE CBC W/AUTO DIFF WBC: CPT | Performed by: INTERNAL MEDICINE

## 2018-06-04 PROCEDURE — 02HV33Z INSERTION OF INFUSION DEVICE INTO SUPERIOR VENA CAVA, PERCUTANEOUS APPROACH: ICD-10-PCS | Performed by: RADIOLOGY

## 2018-06-04 RX ORDER — PREDNISONE 20 MG/1
20 TABLET ORAL DAILY
Status: DISCONTINUED | OUTPATIENT
Start: 2018-06-05 | End: 2018-06-05

## 2018-06-04 RX ORDER — DIPHENHYDRAMINE HYDROCHLORIDE 50 MG/ML
25 INJECTION, SOLUTION INTRAMUSCULAR; INTRAVENOUS
Status: COMPLETED | OUTPATIENT
Start: 2018-06-04 | End: 2018-06-05

## 2018-06-04 RX ORDER — HEPARIN SODIUM 5000 [USP'U]/ML
10000 INJECTION, SOLUTION INTRAVENOUS; SUBCUTANEOUS ONCE
Status: COMPLETED | OUTPATIENT
Start: 2018-06-04 | End: 2018-06-04

## 2018-06-04 RX ORDER — BUMETANIDE 1 MG/1
2 TABLET ORAL DAILY
Status: DISCONTINUED | OUTPATIENT
Start: 2018-06-04 | End: 2018-06-05

## 2018-06-04 RX ORDER — LIDOCAINE HYDROCHLORIDE 10 MG/ML
5 INJECTION, SOLUTION EPIDURAL; INFILTRATION; INTRACAUDAL; PERINEURAL ONCE
Status: COMPLETED | OUTPATIENT
Start: 2018-06-04 | End: 2018-06-04

## 2018-06-04 RX ADMIN — Medication 10 ML: at 21:28

## 2018-06-04 RX ADMIN — CALCITRIOL 0.25 MCG: 0.25 CAPSULE, LIQUID FILLED ORAL at 08:46

## 2018-06-04 RX ADMIN — HEPARIN SODIUM 10000 UNITS: 5000 INJECTION, SOLUTION INTRAVENOUS; SUBCUTANEOUS at 07:43

## 2018-06-04 RX ADMIN — ASPIRIN 81 MG: 81 TABLET, COATED ORAL at 08:46

## 2018-06-04 RX ADMIN — HEPARIN SODIUM AND DEXTROSE 8 UNITS/KG/HR: 10000; 5 INJECTION INTRAVENOUS at 21:29

## 2018-06-04 RX ADMIN — OXYCODONE HYDROCHLORIDE AND ACETAMINOPHEN 1 TABLET: 7.5; 325 TABLET ORAL at 01:01

## 2018-06-04 RX ADMIN — HYDROMORPHONE HYDROCHLORIDE 0.5 MG: 2 INJECTION INTRAMUSCULAR; INTRAVENOUS; SUBCUTANEOUS at 13:27

## 2018-06-04 RX ADMIN — CARVEDILOL 25 MG: 12.5 TABLET, FILM COATED ORAL at 08:45

## 2018-06-04 RX ADMIN — OXYCODONE HYDROCHLORIDE AND ACETAMINOPHEN 1 TABLET: 7.5; 325 TABLET ORAL at 11:21

## 2018-06-04 RX ADMIN — CARVEDILOL 25 MG: 12.5 TABLET, FILM COATED ORAL at 16:20

## 2018-06-04 RX ADMIN — HYDROMORPHONE HYDROCHLORIDE 0.5 MG: 2 INJECTION INTRAMUSCULAR; INTRAVENOUS; SUBCUTANEOUS at 03:37

## 2018-06-04 RX ADMIN — POLYETHYLENE GLYCOL (3350) 17 G: 17 POWDER, FOR SOLUTION ORAL at 08:47

## 2018-06-04 RX ADMIN — HYDROMORPHONE HYDROCHLORIDE 0.5 MG: 2 INJECTION INTRAMUSCULAR; INTRAVENOUS; SUBCUTANEOUS at 00:05

## 2018-06-04 RX ADMIN — HYDROMORPHONE HYDROCHLORIDE 0.5 MG: 2 INJECTION INTRAMUSCULAR; INTRAVENOUS; SUBCUTANEOUS at 23:50

## 2018-06-04 RX ADMIN — HEPARIN SODIUM AND DEXTROSE 11.01 UNITS/KG/HR: 10000; 5 INJECTION INTRAVENOUS at 13:49

## 2018-06-04 RX ADMIN — ONDANSETRON 4 MG: 2 INJECTION, SOLUTION INTRAMUSCULAR; INTRAVENOUS at 13:53

## 2018-06-04 RX ADMIN — AMLODIPINE BESYLATE 10 MG: 5 TABLET ORAL at 08:46

## 2018-06-04 RX ADMIN — LIDOCAINE HYDROCHLORIDE 5 ML: 10 INJECTION, SOLUTION EPIDURAL; INFILTRATION; INTRACAUDAL; PERINEURAL at 11:38

## 2018-06-04 RX ADMIN — ISOSORBIDE MONONITRATE 120 MG: 60 TABLET ORAL at 08:45

## 2018-06-04 RX ADMIN — BUMETANIDE 2 MG: 1 TABLET ORAL at 08:45

## 2018-06-04 RX ADMIN — NITROGLYCERIN 0.4 MG: 0.4 TABLET SUBLINGUAL at 11:21

## 2018-06-04 RX ADMIN — ALLOPURINOL 100 MG: 100 TABLET ORAL at 08:46

## 2018-06-04 RX ADMIN — HYDROMORPHONE HYDROCHLORIDE 0.5 MG: 2 INJECTION INTRAMUSCULAR; INTRAVENOUS; SUBCUTANEOUS at 18:47

## 2018-06-04 RX ADMIN — ATORVASTATIN CALCIUM 80 MG: 20 TABLET, FILM COATED ORAL at 21:26

## 2018-06-04 RX ADMIN — STANDARDIZED SENNA CONCENTRATE AND DOCUSATE SODIUM 1 TABLET: 8.6; 5 TABLET, FILM COATED ORAL at 08:46

## 2018-06-04 NOTE — PROGRESS NOTES
[address] [facility] [place]  YOB: 1955          Assessment & Plan:   CKD 4  · Cr Stable  Overall, mildly up  · At risk of ARF p contast and could need HD. Pt aware :dw her today  · Has AVF which is ready for use    CP  · On oxygen  · Cath tomrrow    HTN   · Reasonably controlled    SHPT   · Calcitriol    Anemia of CKD   · On EPO  · S/p BMB         Subjective:   CC: f/u CKD  HPI: CKD:cr touch high, overall stable  Same sob, better  S/p BMB   DW FAMILY. Bumex for volume control.   ROS: no n/v/   Current Facility-Administered Medications   Medication Dose Route Frequency    bumetanide (BUMEX) tablet 2 mg  2 mg Oral DAILY    sodium chloride (OCEAN) 0.65 % nasal squeeze bottle 2 Spray  2 Spray Both Nostrils Q2H PRN    HYDROmorphone (DILAUDID) injection 0.5 mg  0.5 mg IntraVENous Q4H PRN    0.9% sodium chloride infusion 250 mL  250 mL IntraVENous PRN    heparin 25,000 units in D5W 250 ml infusion  10-36 Units/kg/hr IntraVENous TITRATE    nitroglycerin (NITROSTAT) tablet 0.4 mg  0.4 mg SubLINGual PRN    oxyCODONE-acetaminophen (PERCOCET 7.5) 7.5-325 mg per tablet 1 Tab  1 Tab Oral Q6H PRN    Warfarin: pharmacy to dose    Other Rx Dosing/Monitoring    amLODIPine (NORVASC) tablet 10 mg  10 mg Oral DAILY    albuterol (PROVENTIL VENTOLIN) nebulizer solution 2.5 mg  2.5 mg Nebulization Q6H PRN    allopurinol (ZYLOPRIM) tablet 100 mg  100 mg Oral DAILY    aspirin delayed-release tablet 81 mg  81 mg Oral DAILY    atorvastatin (LIPITOR) tablet 80 mg  80 mg Oral QHS    calcitRIOL (ROCALTROL) capsule 0.25 mcg  0.25 mcg Oral Q M, W, F & SAT    carvedilol (COREG) tablet 25 mg  25 mg Oral BID WITH MEALS    [START ON 6/5/2018] ergocalciferol (ERGOCALCIFEROL) capsule 50,000 Units  50,000 Units Oral every Tuesday    fluticasone (FLONASE) 50 mcg/actuation nasal spray 2 Spray  2 Spray Both Nostrils DAILY PRN    hydrOXYzine HCl (ATARAX) tablet 25 mg  25 mg Oral TID PRN    isosorbide mononitrate ER (IMDUR) tablet 120 mg  120 mg Oral DAILY    pantoprazole (PROTONIX) tablet 40 mg  40 mg Oral ACB    polyethylene glycol (MIRALAX) packet 17 g  17 g Oral DAILY    senna-docusate (PERICOLACE) 8.6-50 mg per tablet 1 Tab  1 Tab Oral DAILY    sodium chloride (NS) flush 5-10 mL  5-10 mL IntraVENous Q8H    sodium chloride (NS) flush 5-10 mL  5-10 mL IntraVENous PRN    naloxone (NARCAN) injection 0.4 mg  0.4 mg IntraVENous PRN    glucose chewable tablet 16 g  4 Tab Oral PRN    dextrose (D50W) injection syrg 12.5-25 g  12.5-25 g IntraVENous PRN    glucagon (GLUCAGEN) injection 1 mg  1 mg IntraMUSCular PRN    acetaminophen (TYLENOL) tablet 650 mg  650 mg Oral Q4H PRN    diphenhydrAMINE (BENADRYL) capsule 25 mg  25 mg Oral Q4H PRN    ondansetron (ZOFRAN) injection 4 mg  4 mg IntraVENous Q4H PRN    insulin lispro (HUMALOG) injection   SubCUTAneous AC&HS          Objective:     Vitals:  Blood pressure 148/72, pulse 85, temperature 98.2 °F (36.8 °C), resp. rate 16, weight 152 kg (335 lb 1.6 oz), SpO2 100 %. Temp (24hrs), Av °F (36.7 °C), Min:97.6 °F (36.4 °C), Max:98.2 °F (36.8 °C)      Intake and Output:   07 -  1900  In: 80.3 [I.V.:80.3]  Out: -    1901 -  0700  In: 1860.5 [P.O.:1500; I.V.:360.5]  Out: 2250 [Urine:2250]    Physical Exam:               GENERAL ASSESSMENT: NAD  CHEST: CTA  HEART: S1S2  ABDOMEN: Soft,NT  EXTREMITY: no EDEMA          Data Review      No results for input(s): TNIPOC in the last 72 hours. No lab exists for component: ITNL   No results for input(s): CPK, CKMB, TROIQ in the last 72 hours.   Recent Labs      18   0550  18   0342  18   0355   NA  140  141  142   K  4.4  4.2  4.1   CL  105  106  108   CO2  22  25  24   BUN  70*  66*  61*   CREA  3.83*  3.75*  3.70*   GLU  122*  121*  112*   MG  1.4*   --   1.2*   CA  9.1  8.8  8.5   WBC  8.6  9.0  8.3   HGB  8.6*  8.8*  7.9*   HCT  27.4*  28.1* 24.9*   PLT  164  152  153      Recent Labs      06/04/18   0550  06/03/18   2207  06/03/18   1833   06/02/18   0355   INR  1.3*   --   1.3*   --   1.4*   PTP  13.1*   --   13.2*   --   14.3*   APTT  39.9*  55.8*  >130.0*   < >   --     < > = values in this interval not displayed. Needs: urine analysis, urine sodium, protein and creatinine  Lab Results   Component Value Date/Time    Sodium urine, random 25 11/10/2014 12:40 PM    Creatinine, urine 145.29 03/04/2017 05:01 AM         : Breanne Valle MD  6/4/2018        [facility]:  [email or web address]  Beata Barnes office:  [address] [place] [address], [address] [place] [address],[address]  [place], [address] [facility]  Phone: [phone]  Fax :     [phone]    [place] office:  [address] [facility], [address] [facility]  Phone - [phone]  Fax - [phone]

## 2018-06-04 NOTE — PROGRESS NOTES
VAT Note:  Chart reviewed for midline placement evaluation. Areas reviewed include, but are not limited to, patient's current and past H&P, Lab and Procedure Results, all notes, media records and medications. Midline order acknowledged and patient not a candidate for midline as she is a dialysis patient with a AVF and midline uses veins needed for fistula. Ultrasound PIV in right forearm or if acess needed for blood draws recommend triple lumen or [place]. [address] to Dr. Samara Montes and informed of above and order to be changed per Dr. Samara Montes.

## 2018-06-04 NOTE — PROGRESS NOTES
1530- Bedside report received patient resting in bed, PTT re-drawn and sent to the lab awaiting results. ~ KEV Reyes RN     1630- PTT >130 will after re-draw will hold infusion per protocol. Elsa Reyes RN     1630- Will re-draw PTT in 2 hours. 1830 ~ KEV Reyes RN     4965- PTT re-drawn and sent will see what the results are before drip is re-started. ~ KEV Reyes RN     5402- Bedside and Verbal shift change report given to Ashia Euceda RN  (oncoming nurse) by Bismark Reyes RN  (offgoing nurse). Report included the following information SBAR, Kardex and Recent Results.

## 2018-06-04 NOTE — PROGRESS NOTES
[facility] follow-up appointment on Wednesday June 6,2018 @ 3:00 p.m. with Dr. Lisa Calxito  Added to Ashleigh Hoff CM Specialist

## 2018-06-04 NOTE — PROGRESS NOTES
Cardiology Progress Note                             [address] [facility], [place], [address] [facility]                                 Phone [phone]; Fax [phone]        2018 9:23 AM     Admit Date:           2018  Admit Diagnosis:  Chest pain  Chest pain  :          1955   MRN:          [id number]   ASSESSMENT/RECOMMENDATION:   1)Chronic recurrent anginal chest pain, exertional and non exertional.  - CAD with 1v disease/RCA  -cont ASA/statin/BB  - C cath scheduled for Tuesday to re-assess coronary arteries   -on high dose of imdur      2) Chronic HFpEF, class III, s/p CardioMEMS for PA monitoring:   - RHC on Tuesday to re-calibrate cardiomems  - cont Bumex       3) CKD  - stable. - Nephrology following, appreciate recommendations  - she will need to stay 1 day post cath to monitor renal function  - she does have an AV fistula      4) Chronic severe anemia, refractory to Procrit:   - hematology following   S/p BMB- results pending       5) Chronic anticoagulation for DVT   - s/p Vit K and FFP (prior to BM biopsy)   - will need to continue to hold coumadin for cardiac cath on Tuesday  - On IV heparin, will need to stop 2 hours prior to CC tomorrow    6) Hypertension: -150- coreg/norvasc/imdur. Continue to monitor.   -could add clonidine if needed. 701 - 1900  In: 80.3 [I.V.:80.3]  Out: -     Last 3 Recorded Weights in this Encounter    18 0335   Weight: 328 lb 7.8 oz (149 kg) 334 lb (151.5 kg) 335 lb 1.6 oz (152 kg)         1901 -  0700  In: 1860.5 [P.O.:1500; I.V.:360.5]  Out: 50 [facility] reports nasal dryness. Continues to have chest pain, but it has been less frequent. LARA unchanged. No bleeding while on heparin gtt.          Current Facility-Administered Medications   Medication Dose Route Frequency    bumetanide (BUMEX) tablet 2 mg  2 mg Oral DAILY    sodium chloride (OCEAN) 0.65 % nasal squeeze bottle 2 Spray  2 Spray Both Nostrils Q2H PRN    HYDROmorphone (DILAUDID) injection 0.5 mg  0.5 mg IntraVENous Q4H PRN    0.9% sodium chloride infusion 250 mL  250 mL IntraVENous PRN    heparin 25,000 units in D5W 250 ml infusion  10-36 Units/kg/hr IntraVENous TITRATE    nitroglycerin (NITROSTAT) tablet 0.4 mg  0.4 mg SubLINGual PRN    oxyCODONE-acetaminophen (PERCOCET 7.5) 7.5-325 mg per tablet 1 Tab  1 Tab Oral Q6H PRN    Warfarin: pharmacy to dose    Other Rx Dosing/Monitoring    amLODIPine (NORVASC) tablet 10 mg  10 mg Oral DAILY    albuterol (PROVENTIL VENTOLIN) nebulizer solution 2.5 mg  2.5 mg Nebulization Q6H PRN    allopurinol (ZYLOPRIM) tablet 100 mg  100 mg Oral DAILY    aspirin delayed-release tablet 81 mg  81 mg Oral DAILY    atorvastatin (LIPITOR) tablet 80 mg  80 mg Oral QHS    calcitRIOL (ROCALTROL) capsule 0.25 mcg  0.25 mcg Oral Q M, W, F & SAT    carvedilol (COREG) tablet 25 mg  25 mg Oral BID WITH MEALS    [START ON 6/5/2018] ergocalciferol (ERGOCALCIFEROL) capsule 50,000 Units  50,000 Units Oral every Tuesday    fluticasone (FLONASE) 50 mcg/actuation nasal spray 2 Spray  2 Spray Both Nostrils DAILY PRN    hydrOXYzine HCl (ATARAX) tablet 25 mg  25 mg Oral TID PRN    isosorbide mononitrate ER (IMDUR) tablet 120 mg  120 mg Oral DAILY    pantoprazole (PROTONIX) tablet 40 mg  40 mg Oral ACB    polyethylene glycol (MIRALAX) packet 17 g  17 g Oral DAILY    senna-docusate (PERICOLACE) 8.6-50 mg per tablet 1 Tab  1 Tab Oral DAILY    sodium chloride (NS) flush 5-10 mL  5-10 mL IntraVENous Q8H    sodium chloride (NS) flush 5-10 mL  5-10 mL IntraVENous PRN    naloxone (NARCAN) injection 0.4 mg  0.4 mg IntraVENous PRN    glucose chewable tablet 16 g  4 Tab Oral PRN    dextrose (D50W) injection syrg 12.5-25 g  12.5-25 g IntraVENous PRN    glucagon (GLUCAGEN) injection 1 mg  1 mg IntraMUSCular PRN    acetaminophen (TYLENOL) tablet 650 mg  650 mg Oral Q4H PRN    diphenhydrAMINE (BENADRYL) capsule 25 mg  25 mg Oral Q4H PRN    ondansetron (ZOFRAN) injection 4 mg  4 mg IntraVENous Q4H PRN    insulin lispro (HUMALOG) injection   SubCUTAneous AC&HS      OBJECTIVE               Intake/Output Summary (Last 24 hours) at 06/04/18 0923  Last data filed at 06/04/18 0706   Gross per 24 hour   Intake          1463.66 ml   Output             1450 ml   Net            13.66 ml       Review of Systems - History obtained from the patient AS PER  HPI    Telemetry [id number]    PHYSICAL EXAM        Visit Vitals    /72    Pulse 85    Temp 98.2 °F (36.8 °C)    Resp 16    Wt 335 lb 1.6 oz (152 kg)    SpO2 100%    BMI 48.08 kg/m2       Gen: Well-developed, obese, in no acute distress  alert and oriented x 3  HEENT:  Pink conjunctivae, Hearing grossly normal.No scleral icterus or conjunctival, moist mucous membranes  Neck: Supple,No JVD  Resp: No accessory muscle use, Clear breath sounds, No rales or rhonchi  Card: Regular Rate,Rythm, No murmurs, rubs or gallop. No thrills. GI:          soft, non-tender   MSK: No cyanosis or clubbing, good capillary refill  Skin: No rashes or ulcers, no bruising  Neuro:  Cranial nerves are grossly intact, moving all four extremities, no focal deficit, follows commands appropriately  Psych:  Good insight, oriented to person, place and time, alert, Nml Affect  LE: No edema       DATA REVIEW            Laboratory and Imaging have been reviewed by me and are notable for  No results for input(s): CPK, CKMB, TROIQ in the last 72 hours.   Recent Labs      06/04/18   0550  06/03/18   0342  06/02/18   0355   NA  140  141  142   K  4.4  4.2  4.1   CO2  22  25  24   BUN  70*  66*  61*   CREA  3.83*  3.75*  3.70*   GLU  122*  121*  112*   MG  1.4*   --   1.2*   WBC  8.6  9.0  8.3   HGB  8.6*  8.8*  7.9*   HCT  27.4* 28.1*  24.9*   PLT  164  152  0968 Edward Trujillo, NP

## 2018-06-04 NOTE — PROGRESS NOTES
Shift Summary  1930: Bedside and Verbal shift change report given to Dennis Scott RN (oncoming nurse) by Jeannine Morales RN (offgoing nurse). Report included the following information SBAR, Kardex, Procedure Summary, Intake/Output, MAR, Accordion, Recent Results and Cardiac Rhythm NSR, AVB. 2015: ptt >130. Called MD, paused drip per heparin protocol. Will reassess in 2 hours. 2210: ptt sent down. 5617: results from 0540 ptt back, ptt low requiring 10,00ml bolus. Believe that difficulty in drawing labs d/t limited access, difficult stick, and need for pausing heparin infusion and avoiding contamination is causing the fluctuating results and possible inaccurate readings. 0730: Bedside and Verbal shift change report given to Georgia Diamond RN (oncoming nurse) by Dennis Scott RN (offgoing nurse). Report included the following information SBAR, Kardex, Procedure Summary, Intake/Output, MAR, Accordion, Recent Results and Cardiac Rhythm NSR AVB. Care Plan Summary  Problem: Falls - Risk of  Goal: *Absence of Falls  Document Sarah Fall Risk and appropriate interventions in the flowsheet.    Outcome: Progressing Towards Goal  Fall Risk Interventions:  Mobility Interventions: Bed/chair exit alarm         Medication Interventions: Teach patient to arise slowly         History of Falls Interventions: Bed/chair exit alarm

## 2018-06-04 NOTE — PROGRESS NOTES
[id number] [facility] at [address] [place] [address]  [phone]    Hematology / Oncology Consult    Reason for Visit:   Volodymyr Curtis is a 63 y.o. female who is seen in consultation at the request of Dr. Ki Smith for evaluation of anemia. History of Present Illness:   Ms. Garcia is a 64 y/o female with DMII, CAD, Stage IV CKD admitted with chest pain, found to have severe anemia. Patient states she has had worsening anemia for several months to years. Last years, she states she received Procrit intermittently, but was in and out of the hospital quite a bit. Her last blood transfusion was in 2017. She was given parenteral iron infusions x 2 in Dec 2017, and then she was started on Procrit in 2018. Since then, she has been receiving the Procrit regularly every 2 weeks. However, her anemia has not responded and patient states the dose of Procrit was even increased. This has been managed by her Nephrologist, Dr. Mario Lofton. She was in the infusion room when she had mentioned recurrent daily CP requiring use of nitroglycerin. She was therefore referred to the ER for further evaluation. Hgb was 7.8 on admission and is 7.5 today. Cardiology and Nephrology have evaluated patient. Cardiology feels her anginal symptoms may be exacerbated by the severe anemia. She denies melena/hematochezia, has undergone prior GI workup which was negative. She used to see Dr. Ita John in  for elevated free light chains, but no evidence of myeloma. Her Hgb at that time was 9.8. She states she still has the CP and SOB. She also reports having COPD and interstitial lung disease, on home O2 for the past several years. Of note, she reports having a history of childhood leukemia while she was in the 2nd grade. She states she was treated in [place] with chemotherapy. She does not have many other details as her mother is . She also reports a h/o sickle cell trait and G6PD.  She has h/o RLE DVT in 2017 and is on Warfarin for this. Her INR is 3.8. Interval History:     Continues with some SOB. Having some nausea but tolerating sub from outside without difficulty. Some soreness to hips. No further complaints at present.  at bedside.       Past Medical History:   Diagnosis Date     Sleep Apnea 2/16/2011    Aortic aneurysm (HCC)     CAD (coronary Artery Disease) Native Artery 2/16/2011    CKD (chronic kidney disease) stage 4, GFR 15-29 ml/min ([address] 2/10/2017    COPD (chronic obstructive pulmonary disease) (HCC)     Diabetic gastroparesis (HCC)     Diastolic heart failure ([address] 10/5/2012    DM type 2 causing neurological disease ([address]     DM type 2 causing renal disease ([address]     DM type 2 causing vascular disease ([address]     Esophageal stricture 2012    dialted Dr. Marissa Crespo G6PD deficiency ([address]     trait    GERD (gastroesophageal reflux disease)     Gout     ILD (interstitial lung disease) ([address]     open lung bx CJW 2010    Morbid obesity ([address]     OA (osteoarthritis)     Ovarian cancer ([address]     cervical and uterine    Rheumatoid arteritis     S/P left pulmonary artery pressure sensor implant placement 8/31/2017    Cardiomems     Tobacco use disorder 3/21/2012    Uterine cervix cancer ([address]     Vitamin D deficiency 8/9/2013      Past Surgical History:   Procedure Laterality Date    CARDIAC SURG PROCEDURE UNLIST      stents    COLONOSCOPY N/A 6/24/2016    COLONOSCOPY performed by Bulmaro Erickson MD at [address] [facility] HX APPENDECTOMY      HX CARPAL TUNNEL RELEASE      bilateral    HX CHOLECYSTECTOMY      HX HERNIA REPAIR      HX HYSTERECTOMY      HX ORTHOPAEDIC  11/12/12    right knee replacement    HX TONSIL AND ADENOIDECTOMY      UPPER GI ENDOSCOPY,VINOD W GUIDE  6/24/2016           Social History   Substance Use Topics    Smoking status: Former Smoker     Packs/day: 0.50     Years: 41.00     Types: Cigarettes     Quit date: 11/9/2014    Smokeless tobacco: Never Used    Alcohol use No      Family History   Problem Relation Age of Onset    Heart Disease Mother     Heart Disease Brother      Current Facility-Administered Medications   Medication Dose Route Frequency    bumetanide (BUMEX) tablet 2 mg  2 mg Oral DAILY    sodium chloride (OCEAN) 0.65 % nasal squeeze bottle 2 Spray  2 Spray Both Nostrils Q2H PRN    [START ON 6/5/2018] predniSONE (DELTASONE) tablet 20 mg  20 mg Oral DAILY    diphenhydrAMINE (BENADRYL) injection 25 mg  25 mg IntraVENous ONCE PRN    HYDROmorphone (DILAUDID) injection 0.5 mg  0.5 mg IntraVENous Q4H PRN    0.9% sodium chloride infusion 250 mL  250 mL IntraVENous PRN    heparin 25,000 units in D5W 250 ml infusion  10-36 Units/kg/hr IntraVENous TITRATE    nitroglycerin (NITROSTAT) tablet 0.4 mg  0.4 mg SubLINGual PRN    oxyCODONE-acetaminophen (PERCOCET 7.5) 7.5-325 mg per tablet 1 Tab  1 Tab Oral Q6H PRN    Warfarin: pharmacy to dose    Other Rx Dosing/Monitoring    amLODIPine (NORVASC) tablet 10 mg  10 mg Oral DAILY    albuterol (PROVENTIL VENTOLIN) nebulizer solution 2.5 mg  2.5 mg Nebulization Q6H PRN    allopurinol (ZYLOPRIM) tablet 100 mg  100 mg Oral DAILY    aspirin delayed-release tablet 81 mg  81 mg Oral DAILY    atorvastatin (LIPITOR) tablet 80 mg  80 mg Oral QHS    calcitRIOL (ROCALTROL) capsule 0.25 mcg  0.25 mcg Oral Q M, W, F & SAT    carvedilol (COREG) tablet 25 mg  25 mg Oral BID WITH MEALS    [START ON 6/5/2018] ergocalciferol (ERGOCALCIFEROL) capsule 50,000 Units  50,000 Units Oral every Tuesday    fluticasone (FLONASE) 50 mcg/actuation nasal spray 2 Spray  2 Spray Both Nostrils DAILY PRN    hydrOXYzine HCl (ATARAX) tablet 25 mg  25 mg Oral TID PRN    isosorbide mononitrate ER (IMDUR) tablet 120 mg  120 mg Oral DAILY    pantoprazole (PROTONIX) tablet 40 mg  40 mg Oral ACB    polyethylene glycol (MIRALAX) packet 17 g  17 g Oral DAILY    senna-docusate (PERICOLACE) 8.6-50 mg per tablet 1 Tab  1 Tab Oral DAILY  sodium chloride (NS) flush 5-10 mL  5-10 mL IntraVENous Q8H    sodium chloride (NS) flush 5-10 mL  5-10 mL IntraVENous PRN    naloxone (NARCAN) injection 0.4 mg  0.4 mg IntraVENous PRN    glucose chewable tablet 16 g  4 Tab Oral PRN    dextrose (D50W) injection syrg 12.5-25 g  12.5-25 g IntraVENous PRN    glucagon (GLUCAGEN) injection 1 mg  1 mg IntraMUSCular PRN    acetaminophen (TYLENOL) tablet 650 mg  650 mg Oral Q4H PRN    diphenhydrAMINE (BENADRYL) capsule 25 mg  25 mg Oral Q4H PRN    ondansetron (ZOFRAN) injection 4 mg  4 mg IntraVENous Q4H PRN    insulin lispro (HUMALOG) injection   SubCUTAneous AC&HS      Allergies   Allergen Reactions    Contrast Dye [Iodine] Anaphylaxis    Levaquin [Levofloxacin] Nausea and Vomiting    Morphine Hives and Itching        Review of Systems: A complete review of systems was obtained, negative except as described above. Physical Exam:     Visit Vitals    /72    Pulse 85    Temp 98.2 °F (36.8 °C)    Resp 16    Wt 152 kg (335 lb 1.6 oz)    SpO2 100%    BMI 48.08 kg/m2     ECOG PS: 2-3  General: Well developed, no acute distress, obese  Eyes: PERRLA, EOMI, anicteric sclerae  HENT: Atraumatic, OP clear, TMs intact without erythema  Neck: Supple  Respiratory: CTAB, normal respiratory effort, On supplemental O2  CV: Normal rate, regular rhythm, no murmurs, no peripheral edema  GI: Soft, nontender, nondistended, no masses. Unable to appreciate HSM due to body habitus. MS: Digits without clubbing or cyanosis. Skin: No rashes, ecchymoses, or petechiae. Normal temperature, turgor, and texture. Neuro/Psych: Alert, oriented. 5/5 strength in all 4 extremities. Appropriate affect, normal judgment/insight. Results:     Lab Results   Component Value Date/Time    WBC 8.6 06/04/2018 05:50 AM    HGB 8.6 (L) 06/04/2018 05:50 AM    HCT 27.4 (L) 06/04/2018 05:50 AM    PLATELET 706 40/03/5380 05:50 AM    MCV 93.8 06/04/2018 05:50 AM    ABS.  NEUTROPHILS 5.0 06/04/2018 05:50 AM    Hemoglobin (POC) 9.9 (L) 07/21/2013 09:51 PM    Hematocrit (POC) 29 (L) 07/21/2013 09:51 PM     Lab Results   Component Value Date/Time    Sodium 140 06/04/2018 05:50 AM    Potassium 4.4 06/04/2018 05:50 AM    Chloride 105 06/04/2018 05:50 AM    CO2 22 06/04/2018 05:50 AM    Glucose 122 (H) 06/04/2018 05:50 AM    BUN 70 (H) 06/04/2018 05:50 AM    Creatinine 3.83 (H) 06/04/2018 05:50 AM    GFR est AA 15 (L) 06/04/2018 05:50 AM    GFR est non-AA 12 (L) 06/04/2018 05:50 AM    Calcium 9.1 06/04/2018 05:50 AM    Sodium (POC) 140 07/21/2013 09:51 PM    Potassium (POC) 3.9 07/21/2013 09:51 PM    Chloride (POC) 109 (H) 07/21/2013 09:51 PM    Glucose (POC) 143 (H) 06/04/2018 12:28 PM    BUN (POC) 30 (H) 07/21/2013 09:51 PM    Creatinine (POC) 2.1 (H) 07/21/2013 09:51 PM    Calcium, ionized (POC) 1.19 07/21/2013 09:51 PM     Lab Results   Component Value Date/Time    Bilirubin, total 0.5 11/21/2017 12:40 PM    ALT (SGPT) 19 11/21/2017 12:40 PM    AST (SGOT) 24 11/21/2017 12:40 PM    Alk. phosphatase 94 11/21/2017 12:40 PM    Protein, total 6.8 11/21/2017 12:40 PM    Albumin 3.2 (L) 11/21/2017 12:40 PM    Globulin 3.6 11/21/2017 12:40 PM     Lab Results   Component Value Date/Time    Iron 55 05/31/2018 02:08 PM    TIBC 201 (L) 05/31/2018 02:08 PM    Iron % saturation 27 05/31/2018 02:08 PM    Ferritin 536 (H) 05/31/2018 02:08 PM       6/1/2018 Bone Marrow bx: results pending    Imaging:     Radiology report(s) reviewed. .    Assessment & Plan:   Rhiannon Chu is a 63 y.o. female with CAD/CHF, CKD admitted with CP and severe chronic anemia. 1. Normocytic anemia: Anemia of chronic renal disease, but not responding to EPO; unsure if she is receive Erythropoietin or Darbepoietin. When one agent shows no response, it may be worth switching to the alternate form.  However, given lack of response to EPO thus far as well as multiple other complicating factors including CAD/angina, will proceed with further evaluation with bone marrow biopsy in order to rule out other pathology. Some patients can develop aplastic anemia as an adverse effect of EPO. No evidence of hemolysis. Retic is inappropriately low. Recheck iron profile reveals normal Ferritn  -- will follow  bone marrow biopsy (6/1) results:      2. Stage IV CKD: Followed by Dr. Te Chen as outpatient. Has AVF in place. 3. CAD/diastolic CHF: On Coreg, ASA, Imdur, Metolazone, Lipitor, Bumex. 4. Chest pain / SOB: Seems consistent with chronic angina. Currently with negative troponin:  cardiology planning for  cardiac cath Tues (6/5)    5. COPD, Interstitial lung disease: On supplemental home oxygen @ 2L/min      6. DMII: Managed by PCP and currently stable. 7. History of RLE DVT: On Warfarin. Holding currently in preparation for procedures.    -- Holding Warfarin received Vit K (5/31) and FFP (6/1); discussed with pharmacy      Plan reviewed with Dr Juan Carlos Hollis By: Palomo Granados NP     June 4, 2018

## 2018-06-04 NOTE — CDMP QUERY
Based on the need for increased specificity in documentation for the new ICD 10 coding system, please include the following components in your documentation regarding:  CHF     Documentation for heart failure must:   Specify Acuity :   Acute, Chronic, acute on chronic  Identify Type:    Systolic, Diastolic, Combined systolic and diastolic failure   List the relationship of HTN to Heart Failure  Identify the underlying cause     The medical record reflects the following clinical findings, treatment, and risk factors. Risk Factors:  H/O    Clinical Indicators:    12/13/2017 ECHO  Left ventricle: Systolic function was normal. Ejection fraction was  estimated to be 60 %. No obvious wall motion abnormalities identified in  the views obtained. Treatment: Cardiology consult, IV Bumex    Please clarify and document your clinical opinion in the progress notes and discharge summary including the definitive and/or presumptive diagnosis, (suspected or probable), related to the above clinical findings. Please include clinical findings supporting your diagnosis.     Thank you  Jacy Henning  [facility]   [phone]

## 2018-06-04 NOTE — PROGRESS NOTES
Shift Summary    0700  Bedside and Verbal shift change report given to Georgia Diamond RN (oncoming nurse) by Georgia Diamond RN (offgoing nurse). Report included the following information SBAR, Kardex, MAR, Recent Results and Cardiac Rhythm        Heparin gtt verified with off going nurse. Bolus given. Patient refused Protonix related to OP procedure that is supposed to be done in within the next week. Patient c/o 8/10 pain PRN dilaudid given     1200  PICC line placed. CXR completed. PICC placement verified. Patient nausea/vomitting. PRN zofran given. 1440  PTT drawn and sent to lab. Awaiting results. Cath lab regarding allergy to contrast. RN stated she will call Dr. Juana Hernandez for orders to premedicate. [address] from lab called. PTT >130. Lab will disregard specimen. Oncoming nurse will redraw. Bedside and Verbal shift change report given to [address] [place] [address] (oncoming nurse) by Georgia Diamond RN (offgoing nurse). Report included the following information SBAR, Kardex, MAR, Recent Results and Cardiac Rhythm  .

## 2018-06-05 LAB
ANION GAP SERPL CALC-SCNC: 11 MMOL/L (ref 5–15)
APTT PPP: 50.6 SEC (ref 22.1–32)
BASOPHILS # BLD: 0 K/UL (ref 0–0.1)
BASOPHILS NFR BLD: 0 % (ref 0–1)
BUN SERPL-MCNC: 68 MG/DL (ref 6–20)
BUN/CREAT SERPL: 18 (ref 12–20)
CALCIUM SERPL-MCNC: 9 MG/DL (ref 8.5–10.1)
CHLORIDE SERPL-SCNC: 106 MMOL/L (ref 97–108)
CO2 SERPL-SCNC: 23 MMOL/L (ref 21–32)
CREAT SERPL-MCNC: 3.84 MG/DL (ref 0.55–1.02)
DIFFERENTIAL METHOD BLD: ABNORMAL
EOSINOPHIL # BLD: 0.5 K/UL (ref 0–0.4)
EOSINOPHIL NFR BLD: 7 % (ref 0–7)
ERYTHROCYTE [DISTWIDTH] IN BLOOD BY AUTOMATED COUNT: 16.5 % (ref 11.5–14.5)
GLUCOSE BLD STRIP.AUTO-MCNC: 125 MG/DL (ref 65–100)
GLUCOSE BLD STRIP.AUTO-MCNC: 168 MG/DL (ref 65–100)
GLUCOSE BLD STRIP.AUTO-MCNC: 213 MG/DL (ref 65–100)
GLUCOSE BLD STRIP.AUTO-MCNC: 307 MG/DL (ref 65–100)
GLUCOSE SERPL-MCNC: 110 MG/DL (ref 65–100)
HCT VFR BLD AUTO: 26.5 % (ref 35–47)
HGB BLD-MCNC: 8.4 G/DL (ref 11.5–16)
IMM GRANULOCYTES # BLD: 0 K/UL (ref 0–0.04)
IMM GRANULOCYTES NFR BLD AUTO: 0 % (ref 0–0.5)
INR PPP: 1.4 (ref 0.9–1.1)
LYMPHOCYTES # BLD: 1.5 K/UL (ref 0.8–3.5)
LYMPHOCYTES NFR BLD: 20 % (ref 12–49)
MAGNESIUM SERPL-MCNC: 1.2 MG/DL (ref 1.6–2.4)
MCH RBC QN AUTO: 30 PG (ref 26–34)
MCHC RBC AUTO-ENTMCNC: 31.7 G/DL (ref 30–36.5)
MCV RBC AUTO: 94.6 FL (ref 80–99)
MONOCYTES # BLD: 0.9 K/UL (ref 0–1)
MONOCYTES NFR BLD: 12 % (ref 5–13)
NEUTS SEG # BLD: 4.8 K/UL (ref 1.8–8)
NEUTS SEG NFR BLD: 61 % (ref 32–75)
NRBC # BLD: 0 K/UL (ref 0–0.01)
NRBC BLD-RTO: 0 PER 100 WBC
PLATELET # BLD AUTO: 147 K/UL (ref 150–400)
PMV BLD AUTO: 10.9 FL (ref 8.9–12.9)
POTASSIUM SERPL-SCNC: 4.4 MMOL/L (ref 3.5–5.1)
PROTHROMBIN TIME: 13.6 SEC (ref 9–11.1)
RBC # BLD AUTO: 2.8 M/UL (ref 3.8–5.2)
SERVICE CMNT-IMP: ABNORMAL
SODIUM SERPL-SCNC: 140 MMOL/L (ref 136–145)
THERAPEUTIC RANGE,PTTT: ABNORMAL SECS (ref 58–77)
WBC # BLD AUTO: 7.8 K/UL (ref 3.6–11)

## 2018-06-05 PROCEDURE — 83735 ASSAY OF MAGNESIUM: CPT | Performed by: INTERNAL MEDICINE

## 2018-06-05 PROCEDURE — 74011250637 HC RX REV CODE- 250/637: Performed by: INTERNAL MEDICINE

## 2018-06-05 PROCEDURE — 36415 COLL VENOUS BLD VENIPUNCTURE: CPT | Performed by: INTERNAL MEDICINE

## 2018-06-05 PROCEDURE — 93451 RIGHT HEART CATH: CPT

## 2018-06-05 PROCEDURE — 74011000250 HC RX REV CODE- 250: Performed by: SPECIALIST

## 2018-06-05 PROCEDURE — 77030004532 HC CATH ANGI DX IMP BSC -A

## 2018-06-05 PROCEDURE — 74011250636 HC RX REV CODE- 250/636: Performed by: SPECIALIST

## 2018-06-05 PROCEDURE — C1751 CATH, INF, PER/CENT/MIDLINE: HCPCS

## 2018-06-05 PROCEDURE — 77030029065 HC DRSG HEMO QCLOT ZMED -B

## 2018-06-05 PROCEDURE — 94660 CPAP INITIATION&MGMT: CPT

## 2018-06-05 PROCEDURE — 74011250637 HC RX REV CODE- 250/637: Performed by: NURSE PRACTITIONER

## 2018-06-05 PROCEDURE — 74011250636 HC RX REV CODE- 250/636: Performed by: INTERNAL MEDICINE

## 2018-06-05 PROCEDURE — C1894 INTRO/SHEATH, NON-LASER: HCPCS

## 2018-06-05 PROCEDURE — 82962 GLUCOSE BLOOD TEST: CPT

## 2018-06-05 PROCEDURE — 65660000000 HC RM CCU STEPDOWN

## 2018-06-05 PROCEDURE — C1769 GUIDE WIRE: HCPCS

## 2018-06-05 PROCEDURE — 77030019569 HC BND COMPR RAD TERU -B

## 2018-06-05 PROCEDURE — 85025 COMPLETE CBC W/AUTO DIFF WBC: CPT | Performed by: INTERNAL MEDICINE

## 2018-06-05 PROCEDURE — 74011000250 HC RX REV CODE- 250: Performed by: NURSE PRACTITIONER

## 2018-06-05 PROCEDURE — 80048 BASIC METABOLIC PNL TOTAL CA: CPT | Performed by: INTERNAL MEDICINE

## 2018-06-05 PROCEDURE — 99153 MOD SED SAME PHYS/QHP EA: CPT

## 2018-06-05 PROCEDURE — 4A023N6 MEASUREMENT OF CARDIAC SAMPLING AND PRESSURE, RIGHT HEART, PERCUTANEOUS APPROACH: ICD-10-PCS | Performed by: SPECIALIST

## 2018-06-05 PROCEDURE — 85610 PROTHROMBIN TIME: CPT | Performed by: INTERNAL MEDICINE

## 2018-06-05 PROCEDURE — 99152 MOD SED SAME PHYS/QHP 5/>YRS: CPT

## 2018-06-05 PROCEDURE — 74011250636 HC RX REV CODE- 250/636: Performed by: NURSE PRACTITIONER

## 2018-06-05 PROCEDURE — 74011636637 HC RX REV CODE- 636/637: Performed by: INTERNAL MEDICINE

## 2018-06-05 PROCEDURE — 85730 THROMBOPLASTIN TIME PARTIAL: CPT | Performed by: INTERNAL MEDICINE

## 2018-06-05 RX ORDER — FENTANYL CITRATE 50 UG/ML
12.5-2 INJECTION, SOLUTION INTRAMUSCULAR; INTRAVENOUS
Status: DISCONTINUED | OUTPATIENT
Start: 2018-06-05 | End: 2018-06-05 | Stop reason: HOSPADM

## 2018-06-05 RX ORDER — HEPARIN SODIUM 200 [USP'U]/100ML
500 INJECTION, SOLUTION INTRAVENOUS
Status: DISCONTINUED | OUTPATIENT
Start: 2018-06-05 | End: 2018-06-05 | Stop reason: HOSPADM

## 2018-06-05 RX ORDER — HEPARIN SODIUM 1000 [USP'U]/ML
1000-5000 INJECTION, SOLUTION INTRAVENOUS; SUBCUTANEOUS
Status: DISCONTINUED | OUTPATIENT
Start: 2018-06-05 | End: 2018-06-05

## 2018-06-05 RX ORDER — HEPARIN SODIUM 1000 [USP'U]/ML
INJECTION, SOLUTION INTRAVENOUS; SUBCUTANEOUS
Status: DISPENSED
Start: 2018-06-05 | End: 2018-06-05

## 2018-06-05 RX ORDER — MAGNESIUM SULFATE HEPTAHYDRATE 40 MG/ML
2 INJECTION, SOLUTION INTRAVENOUS ONCE
Status: COMPLETED | OUTPATIENT
Start: 2018-06-05 | End: 2018-06-05

## 2018-06-05 RX ORDER — PREDNISONE 20 MG/1
20 TABLET ORAL
Status: DISCONTINUED | OUTPATIENT
Start: 2018-06-05 | End: 2018-06-05

## 2018-06-05 RX ORDER — BUMETANIDE 0.25 MG/ML
2 INJECTION INTRAMUSCULAR; INTRAVENOUS 2 TIMES DAILY
Status: DISCONTINUED | OUTPATIENT
Start: 2018-06-05 | End: 2018-06-05

## 2018-06-05 RX ORDER — PREDNISONE 20 MG/1
40 TABLET ORAL
Status: DISCONTINUED | OUTPATIENT
Start: 2018-06-05 | End: 2018-06-05

## 2018-06-05 RX ORDER — BUMETANIDE 0.25 MG/ML
2 INJECTION INTRAMUSCULAR; INTRAVENOUS 2 TIMES DAILY
Status: DISCONTINUED | OUTPATIENT
Start: 2018-06-05 | End: 2018-06-14

## 2018-06-05 RX ORDER — LIDOCAINE HYDROCHLORIDE 10 MG/ML
10-20 INJECTION INFILTRATION; PERINEURAL
Status: DISCONTINUED | OUTPATIENT
Start: 2018-06-05 | End: 2018-06-11 | Stop reason: HOSPADM

## 2018-06-05 RX ORDER — SODIUM CHLORIDE 0.9 % (FLUSH) 0.9 %
5-10 SYRINGE (ML) INJECTION AS NEEDED
Status: DISCONTINUED | OUTPATIENT
Start: 2018-06-05 | End: 2018-06-15 | Stop reason: HOSPADM

## 2018-06-05 RX ORDER — DIPHENHYDRAMINE HYDROCHLORIDE 50 MG/ML
25-50 INJECTION, SOLUTION INTRAMUSCULAR; INTRAVENOUS ONCE
Status: DISCONTINUED | OUTPATIENT
Start: 2018-06-05 | End: 2018-06-05 | Stop reason: HOSPADM

## 2018-06-05 RX ORDER — MIDAZOLAM HYDROCHLORIDE 1 MG/ML
.5-1 INJECTION, SOLUTION INTRAMUSCULAR; INTRAVENOUS
Status: DISCONTINUED | OUTPATIENT
Start: 2018-06-05 | End: 2018-06-05 | Stop reason: HOSPADM

## 2018-06-05 RX ORDER — SODIUM CHLORIDE 0.9 % (FLUSH) 0.9 %
5-10 SYRINGE (ML) INJECTION EVERY 8 HOURS
Status: DISCONTINUED | OUTPATIENT
Start: 2018-06-05 | End: 2018-06-15 | Stop reason: HOSPADM

## 2018-06-05 RX ORDER — HYDRALAZINE HYDROCHLORIDE 25 MG/1
50 TABLET, FILM COATED ORAL 3 TIMES DAILY
Status: DISCONTINUED | OUTPATIENT
Start: 2018-06-05 | End: 2018-06-07

## 2018-06-05 RX ORDER — ACETAMINOPHEN 650 MG/1
650 SUPPOSITORY RECTAL ONCE
Status: DISCONTINUED | OUTPATIENT
Start: 2018-06-05 | End: 2018-06-05

## 2018-06-05 RX ORDER — LANOLIN ALCOHOL/MO/W.PET/CERES
400 CREAM (GRAM) TOPICAL 2 TIMES DAILY
Status: DISCONTINUED | OUTPATIENT
Start: 2018-06-05 | End: 2018-06-15 | Stop reason: HOSPADM

## 2018-06-05 RX ORDER — VERAPAMIL HYDROCHLORIDE 2.5 MG/ML
2.5-5 INJECTION, SOLUTION INTRAVENOUS
Status: DISCONTINUED | OUTPATIENT
Start: 2018-06-05 | End: 2018-06-05

## 2018-06-05 RX ADMIN — Medication 10 ML: at 21:51

## 2018-06-05 RX ADMIN — LIDOCAINE HYDROCHLORIDE 5 ML: 10 INJECTION, SOLUTION INFILTRATION; PERINEURAL at 12:20

## 2018-06-05 RX ADMIN — HYDRALAZINE HYDROCHLORIDE 50 MG: 25 TABLET, FILM COATED ORAL at 21:50

## 2018-06-05 RX ADMIN — MAGNESIUM SULFATE HEPTAHYDRATE 2 G: 40 INJECTION, SOLUTION INTRAVENOUS at 10:21

## 2018-06-05 RX ADMIN — BUMETANIDE 2 MG: 0.25 INJECTION INTRAMUSCULAR; INTRAVENOUS at 14:29

## 2018-06-05 RX ADMIN — HYDROMORPHONE HYDROCHLORIDE 0.5 MG: 2 INJECTION INTRAMUSCULAR; INTRAVENOUS; SUBCUTANEOUS at 04:09

## 2018-06-05 RX ADMIN — ATORVASTATIN CALCIUM 80 MG: 20 TABLET, FILM COATED ORAL at 21:50

## 2018-06-05 RX ADMIN — MAGNESIUM SULFATE HEPTAHYDRATE 2 G: 40 INJECTION, SOLUTION INTRAVENOUS at 14:30

## 2018-06-05 RX ADMIN — ALLOPURINOL 100 MG: 100 TABLET ORAL at 08:55

## 2018-06-05 RX ADMIN — FENTANYL CITRATE 25 MCG: 50 INJECTION, SOLUTION INTRAMUSCULAR; INTRAVENOUS at 12:28

## 2018-06-05 RX ADMIN — INSULIN LISPRO 4 UNITS: 100 INJECTION, SOLUTION INTRAVENOUS; SUBCUTANEOUS at 16:45

## 2018-06-05 RX ADMIN — MIDAZOLAM 1 MG: 1 INJECTION INTRAMUSCULAR; INTRAVENOUS at 11:49

## 2018-06-05 RX ADMIN — CARVEDILOL 25 MG: 12.5 TABLET, FILM COATED ORAL at 08:55

## 2018-06-05 RX ADMIN — HEPARIN SODIUM 1000 UNITS: 200 INJECTION, SOLUTION INTRAVENOUS at 12:23

## 2018-06-05 RX ADMIN — MIDAZOLAM 1 MG: 1 INJECTION INTRAMUSCULAR; INTRAVENOUS at 12:28

## 2018-06-05 RX ADMIN — DIPHENHYDRAMINE HYDROCHLORIDE 50 MG: 50 INJECTION, SOLUTION INTRAMUSCULAR; INTRAVENOUS at 11:29

## 2018-06-05 RX ADMIN — LIDOCAINE HYDROCHLORIDE 20 ML: 10 INJECTION, SOLUTION INFILTRATION; PERINEURAL at 11:50

## 2018-06-05 RX ADMIN — HYDROMORPHONE HYDROCHLORIDE 0.5 MG: 2 INJECTION INTRAMUSCULAR; INTRAVENOUS; SUBCUTANEOUS at 20:03

## 2018-06-05 RX ADMIN — HYDROMORPHONE HYDROCHLORIDE 0.5 MG: 2 INJECTION INTRAMUSCULAR; INTRAVENOUS; SUBCUTANEOUS at 08:55

## 2018-06-05 RX ADMIN — HYDRALAZINE HYDROCHLORIDE 50 MG: 25 TABLET, FILM COATED ORAL at 16:45

## 2018-06-05 RX ADMIN — CARVEDILOL 25 MG: 12.5 TABLET, FILM COATED ORAL at 16:44

## 2018-06-05 RX ADMIN — HYDROMORPHONE HYDROCHLORIDE 0.5 MG: 2 INJECTION INTRAMUSCULAR; INTRAVENOUS; SUBCUTANEOUS at 14:47

## 2018-06-05 RX ADMIN — Medication 400 MG: at 17:36

## 2018-06-05 RX ADMIN — ISOSORBIDE MONONITRATE 120 MG: 60 TABLET ORAL at 09:01

## 2018-06-05 RX ADMIN — STANDARDIZED SENNA CONCENTRATE AND DOCUSATE SODIUM 1 TABLET: 8.6; 5 TABLET, FILM COATED ORAL at 08:55

## 2018-06-05 RX ADMIN — FENTANYL CITRATE 25 MCG: 50 INJECTION, SOLUTION INTRAMUSCULAR; INTRAVENOUS at 11:49

## 2018-06-05 RX ADMIN — ASPIRIN 81 MG: 81 TABLET, COATED ORAL at 08:55

## 2018-06-05 RX ADMIN — ERGOCALCIFEROL 50000 UNITS: 1.25 CAPSULE ORAL at 08:55

## 2018-06-05 RX ADMIN — BUMETANIDE 2 MG: 1 TABLET ORAL at 08:54

## 2018-06-05 RX ADMIN — FLUTICASONE PROPIONATE 2 SPRAY: 50 SPRAY, METERED NASAL at 22:56

## 2018-06-05 RX ADMIN — INSULIN LISPRO 5 UNITS: 100 INJECTION, SOLUTION INTRAVENOUS; SUBCUTANEOUS at 21:50

## 2018-06-05 RX ADMIN — Medication 400 MG: at 14:30

## 2018-06-05 RX ADMIN — METHYLPREDNISOLONE SODIUM SUCCINATE 125 MG: 125 INJECTION, POWDER, FOR SOLUTION INTRAMUSCULAR; INTRAVENOUS at 09:02

## 2018-06-05 RX ADMIN — HEPARIN SODIUM AND DEXTROSE 9 UNITS/KG/HR: 10000; 5 INJECTION INTRAVENOUS at 04:18

## 2018-06-05 RX ADMIN — WARFARIN SODIUM 3 MG: 2 TABLET ORAL at 17:36

## 2018-06-05 RX ADMIN — HEPARIN SODIUM 1000 UNITS: 200 INJECTION, SOLUTION INTRAVENOUS at 12:24

## 2018-06-05 RX ADMIN — METHYLPREDNISOLONE SODIUM SUCCINATE 125 MG: 125 INJECTION, POWDER, FOR SOLUTION INTRAMUSCULAR; INTRAVENOUS at 12:00

## 2018-06-05 RX ADMIN — AMLODIPINE BESYLATE 10 MG: 5 TABLET ORAL at 08:55

## 2018-06-05 NOTE — NURSE NAVIGATOR
Cardiomems device recalibration done today with Right heart catheterization by Dr Manjit Pizarro. [employer] representatives Naa Ashley and Kaci Thakur were on site and worked remotely with Sancho Aguilera of the [employer] to recalibrate the device. Cardiomems readings done prior to procedure in patient room at 1033 am,  during Right heart cath at 1209,  and after procedure back in patient room at 2:27 pm.  To finish recalibration process, patient will need to transmit 2 readings from her home pillow on day of discharge. Patient currently receiving IV diuretics for elevated PAd.     Transmitted readings:

## 2018-06-05 NOTE — PROGRESS NOTES
5552- Bedside report received patient resting in bed no concerns at this time. ~ KEV Reyes RN     0930- Patient has received Solumedrol before Cardiac Cath, Heparin will be turned off at 1000am per [facility] NP. ~ KEV Reyes RN     1015- Heparin drip stopped for Cath. ~ KEV Reyes RN     2556- Cath nurses here for patient. ~ KEV Reyes RN     1400- Patient back from procedure she is laying at 30 degress no bleeding or hematoma noted to right wrist or Right groin.  ~ KEV Reyes RN     1600- Dressing to right wrist and groin are dry and intact no bleeding or hematoma noted. ~ KEV Reyes RN     7525- Dressing to right wrist and groin are dry and intact no bleeding or hematoma noted. ~ KEV Reyes RN     1900- Bedside and Verbal shift change report given to Marianela Bermeo  (oncoming nurse) by Vj Reyes RN  (offgoing nurse). Report included the following information SBAR, Kardex and Med Rec Status.

## 2018-06-05 NOTE — PROGRESS NOTES
Problem: Falls - Risk of  Goal: *Absence of Falls  Document Sarah Fall Risk and appropriate interventions in the flowsheet.    Outcome: Progressing Towards Goal  Fall Risk Interventions:  Mobility Interventions: Assess mobility with egress test         Medication Interventions: Bed/chair exit alarm         History of Falls Interventions: Bed/chair exit alarm, Consult care management for discharge planning, Evaluate medications/consider consulting pharmacy

## 2018-06-05 NOTE — PROGRESS NOTES
Problem: Falls - Risk of  Goal: *Absence of Falls  Document Sarah Fall Risk and appropriate interventions in the flowsheet.    Outcome: Progressing Towards Goal  Fall Risk Interventions:  Mobility Interventions: Assess mobility with egress test, Bed/chair exit alarm, Patient to call before getting OOB, PT Consult for mobility concerns, Strengthening exercises (ROM-active/passive)         Medication Interventions: Bed/chair exit alarm, Assess postural VS orthostatic hypotension, Patient to call before getting OOB, Teach patient to arise slowly         History of Falls Interventions: Bed/chair exit alarm, Utilize gait belt for transfer/ambulation

## 2018-06-05 NOTE — PROGRESS NOTES
Nutrition Assessment:    RECOMMENDATIONS/INTERVENTION(S):   Recommend advancing diet to  Cardiac 2 gm/CCD 2200 kcal  Monitor PO intakes, weight, BG  Consider additional bowel regimen. ASSESSMENT:   6/5: 63 yr old female admitted for chest pain. Pt screened for LOS. Pt currently NPO- had cardiac cath placed earlier today. Advance diet as able to Cardiac 2 gm/CCD 2200 kcal. Pt with history of DM, A1c 5.2, Hgb 8.4, so A1c likely inaccurate.  ,122, 121 mg/dL, , 125, 198, 202 mg/dL. Weight relatively stable. Usual weight between 330-345 lbs. Currently 334 lbs. When pt was on a diet, she ate 100% of tray. Appetite normal. Pt hungry after being NPO all day for cardiac cath. No history of chew/swallow difficulties. No n/v currently. No food allergies. Last BM 5/29. On miralax and colace. Pt may require additional bowel regimen.  BLE 2+, BUE 1+    SUBJECTIVE/OBJECTIVE:   Diet Order: NPO  % Eaten:  Patient Vitals for the past 168 hrs:   % Diet Eaten   06/01/18 1745 100 %     Pertinent Medications: [x] Reviewed    Past Medical History:   Diagnosis Date     Sleep Apnea 2/16/2011    Aortic aneurysm ([address]     CAD (coronary Artery Disease) Native Artery 2/16/2011    CKD (chronic kidney disease) stage 4, GFR 15-29 ml/min ([address] 2/10/2017    COPD (chronic obstructive pulmonary disease) (HCC)     Diabetic gastroparesis (HCC)     Diastolic heart failure ([address] 10/5/2012    DM type 2 causing neurological disease ([address]     DM type 2 causing renal disease ([address]     DM type 2 causing vascular disease ([address]     Esophageal stricture 2012    dialted Dr. Marissa Crespo G6PD deficiency ([address]     trait    GERD (gastroesophageal reflux disease)     Gout     ILD (interstitial lung disease) ([address]     open lung bx CJW 2010    Morbid obesity ([address]     OA (osteoarthritis)     Ovarian cancer ([address]     cervical and uterine    Rheumatoid arteritis     S/P left pulmonary artery pressure sensor implant placement 8/31/2017 Cardi[facility]ems     Tobacco use disorder 3/21/2012    [facility]     Vitamin D deficiency 8/9/2013        Chemistries:  Lab Results   Component Value Date/Time    Sodium 140 06/05/2018 03:09 AM    Potassium 4.4 06/05/2018 03:09 AM    Chloride 106 06/05/2018 03:09 AM    CO2 23 06/05/2018 03:09 AM    Anion gap 11 06/05/2018 03:09 AM    Glucose 110 (H) 06/05/2018 03:09 AM    BUN 68 (H) 06/05/2018 03:09 AM    Creatinine 3.84 (H) 06/05/2018 03:09 AM    BUN/Creatinine ratio 18 06/05/2018 03:09 AM    GFR est AA 15 (L) 06/05/2018 03:09 AM    GFR est non-AA 12 (L) 06/05/2018 03:09 AM    Calcium 9.0 06/05/2018 03:09 AM    AST (SGOT) 24 11/21/2017 12:40 PM    Alk. phosphatase 94 11/21/2017 12:40 PM    Protein, total 6.8 11/21/2017 12:40 PM    Albumin 3.2 (L) 11/21/2017 12:40 PM    Globulin 3.6 11/21/2017 12:40 PM    A-G Ratio 0.9 (L) 11/21/2017 12:40 PM    ALT (SGPT) 19 11/21/2017 12:40 PM      Anthropometrics: Height: 5' 10\" (177.8 cm) Weight: 151.5 kg (334 lb)    IBW (%IBW): 68 kg (150 lb) ( ) UBW (%UBW):   (  %)    BMI: Body mass index is 47.92 kg/(m^2). This BMI is indicative of:     [] Underweight    [] Normal    [] Overweight    []  Obesity    [x]  Extreme Obesity (BMI>40)    Estimated Nutrition Needs (Based on): 4194 Kcals/day ([id number]. 3)-500) , 121 g (-152g/day(0.8-1.0g/kg)) Protein  Carbohydrate: At Least 130 g/day  Fluids: 2300 mL/day (1mL/kg rounded to 50 mL)    Last BM: 5/29  [x]Active     []Hyperactive  []Hypoactive       [] Absent   BS  Skin:    [] Intact   [x] Incision - cardiac cath placed  [] Breakdown   [] DTI   [] Tears/Excoriation/Abrasion  []Edema [] Other:    Wt Readings from Last 30 Encounters:   06/05/18 151.5 kg (334 lb)   05/15/18 152.1 kg (335 lb 6.4 oz)   04/03/18 153 kg (337 lb 3.2 oz)   03/20/18 153.8 kg (339 lb)   02/22/18 153.8 kg (339 lb)   01/24/18 154.7 kg (341 lb)   01/10/18 156.5 kg (345 lb)   12/22/17 153.8 kg (339 lb)   12/19/17 152.9 kg (337 lb)   12/06/17 156 kg (344 lb)   11/29/17 155.1 kg (342 lb)   11/21/17 154.2 kg (340 lb)   11/20/17 156.9 kg (346 lb)   11/14/17 156.9 kg (346 lb)   10/25/17 151.5 kg (334 lb)   10/10/17 149.7 kg (330 lb)   09/15/17 145.6 kg (321 lb)   09/12/17 149.2 kg (329 lb)   09/05/17 152 kg (335 lb)   08/31/17 151.1 kg (333 lb 1.8 oz)   08/18/17 152.4 kg (336 lb)   08/15/17 153.8 kg (339 lb)   08/03/17 155.1 kg (342 lb)   07/11/17 151.2 kg (333 lb 6.4 oz)   06/07/17 155.6 kg (343 lb)   05/24/17 151.9 kg (334 lb 12.8 oz)   05/17/17 151.5 kg (334 lb)   05/10/17 152.6 kg (336 lb 6.4 oz)   04/17/17 145.1 kg (319 lb 12.8 oz)   03/29/17 144.7 kg (319 lb)      NUTRITION DIAGNOSES:   Problem:  Inadequate energy intake     Etiology: related to decreased ability to consume sufficient energy     Signs/Symptoms: as evidenced by NPO status, large stature      NUTRITION INTERVENTIONS:  Meals/Snacks: General/healthful diet   Supplements: Commercial supplement              GOAL:   Diet will advance and pt will tolerate >50% of meals within 3-5 days    Cultural, Anabaptist, or Ethnic Dietary Needs: None     LEARNING NEEDS (Diet, Food/Nutrient-Drug Interaction):    [x] None Identified   [] Identified and Education Provided/Documented   [] Identified and Pt declined/was not appropriate      [x] Interdisciplinary Care Plan Reviewed/Documented    [x] Discharge Needs:    TBD   [] No Nutrition Related Discharge Needs    NUTRITION RISK:   Pt Is At Nutrition Risk  [x]     No Nutrition Risk Identified  []       PT SEEN FOR:    []  MD Consult: []Calorie Count      []Diabetic Diet Education        []Diet Education     []Electrolyte Management     []General Nutrition Management and Supplements     []Management of Tube Feeding     []TPN Recommendations    []  RN Referral:  []MST score >=2     []Enteral/Parenteral Nutrition PTA     []Pregnant: Gestational DM or Multigestation                 [] Pressure Ulcer      []  Low BMI      [x]  Length of Stay       [] Dysphagia Diet     [] Ventilator      [] Follow-Up        Previous Recommendations:   [] Implemented          [] Not Implemented          [x] Not Applicable    Previous Goal:   [] Met              [] Progressing Towards Goal              [] Not Progressing Towards Goal   [x] Not Applicable              Arturo Mae, [address]  Pager: [phone]  Office: [phone]

## 2018-06-05 NOTE — PROCEDURES
Cardiac Catheterization    Date of Procedure: 6/5/2018   Preoperative Diagnosis: PA HTN, class 4 LARA, CardioMEMS recalibration  Postoperative Diagnosis: see below    Procedure: Right heart cath via right femoral vein. Attempted right radial access - unable to advance wire. Did not pursue further attempts at [facility] due to tenuous respiratory status  Cardiologist: Rose Burnham MD  Anesthesia: local + IV sedation  Estimated Blood Loss: Minimal  Specimens removed: None  Findings: PA 57/30/40, PCW 30 RV 58/23, RA 22. .  See full cath note.   Complications: none    Moderate-severe PA HTN, post capillary  Marked elevation in biV filling pressures      Needs aggressive iv diuretics  Optimize BP

## 2018-06-05 NOTE — ADVANCED PRACTICE NURSE
Reviewed cardiac cath results and plan with patient and family. Patient requesting urinary catheter with IV diuretics, says she cannot get up to bedside commode.    Will try [place] first, patient agreeable     Plan:  Start Hydralazine 50 mg TID  D/c heparin gtt  Start coumadin tonight (d/w pharmacy)  Change PO bumex to 2 mg IV BID, monitor creatinine closely   D/w Dr Bro Alvarez

## 2018-06-05 NOTE — PROGRESS NOTES
[facility] Dosing Services: 6/5/2018    Consult for Warfarin Dosing by Pharmacy by JUANCHO Prescott NP. Consult provided for this 63 y.o.  female , for indication of Venous Thrombosis. Day of Therapy:  2 mg on Mon, Fri and 3 mg (1.5 tabs) on Sun, Tue, Wed, Thurs, Sat   Dose to achieve an INR goal of 2-3    Order entered for  Warfarin  3 (mg) ordered to be given today at 18:00. Significant drug interactions: Vit K 5 mg 5/31/18  PT/INR Lab Results   Component Value Date/Time    INR 1.4 (H) 06/05/2018 03:09 AM      Platelets Lab Results   Component Value Date/Time    PLATELET 579 (L) 82/65/6086 03:09 AM      H/H Lab Results   Component Value Date/Time    HGB 8.4 (L) 06/05/2018 03:09 AM        Pharmacy to follow daily and will provide subsequent Warfarin dosing based on clinical status.   Kareem Du)  Contact information [phone]

## 2018-06-05 NOTE — PROGRESS NOTES
Cardiology Progress Note                             [address], [place], [address]                                 Phone [phone]; Fax [phone]        2018 9:23 AM     Admit Date:           2018  Admit Diagnosis:  Chest pain  Chest pain  :          1955   MRN:          [id number]   ASSESSMENT/RECOMMENDATION:   1)Chronic recurrent anginal chest pain, exertional and non exertional.  - CAD with 1v disease/RCA  -cont ASA/statin/BB  - [facility] cath scheduled for today to re-assess coronary arteries, pre medicate w/ IV solumedrol and benadryl prior to cath   -on high dose of imdur      2) Chronic HFpEF, class III, s/p CardioMEMS for PA monitoring:   - RHC today to re-calibrate cardiomems  - cont Bumex       3) CKD  - stable. - Nephrology following, appreciate recommendations  - she will need to stay 1 day post cath to monitor renal function  - she does have an AV fistula      4) Chronic severe anemia, refractory to Procrit:   - hematology following   S/p BMB- results pending       5) Chronic anticoagulation for DVT   - s/p Vit K and FFP (prior to BM biopsy)   - will need to continue to hold coumadin for cardiac cath on today- will restart coumadin tonight. Resume heparin gtt post cath per Dr Marleny Hernandez.   - On IV heparin, can hold heparin gtt at 8 for CC. 6) Hypertension: -160's    coreg/norvasc/imdur. Continue to monitor.   -could add clonidine if needed. 7) Hypo magensium: Mag 1.2- will 2 grams of Mag IV now                     Last 3 Recorded Weights in this Encounter    18 0335 18 0413   Weight: 334 lb (151.5 kg) 335 lb 1.6 oz (152 kg) 334 lb (151.5 kg)         1901 -  0700  In: 1897.6 [P.O.:1680; I.V.:217.6]  Out: 1830 [MINQC:3220]    [facility] reports congested cough- non productive.     Continues to have chest pain, but it has been less frequent. None this AM.   LARA unchanged. No bleeding while on heparin gtt.          Current Facility-Administered Medications   Medication Dose Route Frequency    bumetanide (BUMEX) tablet 2 mg  2 mg Oral DAILY    sodium chloride (OCEAN) 0.65 % nasal squeeze bottle 2 Spray  2 Spray Both Nostrils Q2H PRN    diphenhydrAMINE (BENADRYL) injection 25 mg  25 mg IntraVENous ONCE PRN    HYDROmorphone (DILAUDID) injection 0.5 mg  0.5 mg IntraVENous Q4H PRN    0.9% sodium chloride infusion 250 mL  250 mL IntraVENous PRN    heparin 25,000 units in D5W 250 ml infusion  10-36 Units/kg/hr IntraVENous TITRATE    nitroglycerin (NITROSTAT) tablet 0.4 mg  0.4 mg SubLINGual PRN    oxyCODONE-acetaminophen (PERCOCET 7.5) 7.5-325 mg per tablet 1 Tab  1 Tab Oral Q6H PRN    Warfarin: pharmacy to dose    Other Rx Dosing/Monitoring    amLODIPine (NORVASC) tablet 10 mg  10 mg Oral DAILY    albuterol (PROVENTIL VENTOLIN) nebulizer solution 2.5 mg  2.5 mg Nebulization Q6H PRN    allopurinol (ZYLOPRIM) tablet 100 mg  100 mg Oral DAILY    aspirin delayed-release tablet 81 mg  81 mg Oral DAILY    atorvastatin (LIPITOR) tablet 80 mg  80 mg Oral QHS    calcitRIOL (ROCALTROL) capsule 0.25 mcg  0.25 mcg Oral Q M, W, F & SAT    carvedilol (COREG) tablet 25 mg  25 mg Oral BID WITH MEALS    ergocalciferol (ERGOCALCIFEROL) capsule 50,000 Units  50,000 Units Oral every Tuesday    fluticasone (FLONASE) 50 mcg/actuation nasal spray 2 Spray  2 Spray Both Nostrils DAILY PRN    hydrOXYzine HCl (ATARAX) tablet 25 mg  25 mg Oral TID PRN    isosorbide mononitrate ER (IMDUR) tablet 120 mg  120 mg Oral DAILY    pantoprazole (PROTONIX) tablet 40 mg  40 mg Oral ACB    polyethylene glycol (MIRALAX) packet 17 g  17 g Oral DAILY    senna-docusate (PERICOLACE) 8.6-50 mg per tablet 1 Tab  1 Tab Oral DAILY    sodium chloride (NS) flush 5-10 mL  5-10 mL IntraVENous Q8H    sodium chloride (NS) flush 5-10 mL 5-10 mL IntraVENous PRN    naloxone (NARCAN) injection 0.4 mg  0.4 mg IntraVENous PRN    glucose chewable tablet 16 g  4 Tab Oral PRN    dextrose (D50W) injection syrg 12.5-25 g  12.5-25 g IntraVENous PRN    glucagon (GLUCAGEN) injection 1 mg  1 mg IntraMUSCular PRN    acetaminophen (TYLENOL) tablet 650 mg  650 mg Oral Q4H PRN    diphenhydrAMINE (BENADRYL) capsule 25 mg  25 mg Oral Q4H PRN    ondansetron (ZOFRAN) injection 4 mg  4 mg IntraVENous Q4H PRN    insulin lispro (HUMALOG) injection   SubCUTAneous AC&HS      OBJECTIVE               Intake/Output Summary (Last 24 hours) at 06/05/18 0933  Last data filed at 06/05/18 5004   Gross per 24 hour   Intake              500 ml   Output              380 ml   Net              120 ml       Review of Systems - History obtained from the patient AS PER  HPI    Telemetry NSR    PHYSICAL EXAM        Visit Vitals    /84 (BP 1 Location: Right arm, BP Patient Position: At rest)    Pulse 69    Temp 98.1 °F (36.7 °C)    Resp 16    Wt 334 lb (151.5 kg)    SpO2 94%    BMI 47.92 kg/m2       Gen: Well-developed, obese, in no acute distress  alert and oriented x 3  HEENT:  Pink conjunctivae, Hearing grossly normal.No scleral icterus or conjunctival, moist mucous membranes  Neck: Supple, JVD difficult to appreciate  Resp: No accessory muscle use, Clear breath sounds, congested cough- rhonchi-clears with cough (non productive)  Card: Regular Rate,Rythm, No murmurs, rubs or gallop. No thrills.    GI:          soft, non-tender   MSK: No cyanosis or clubbing, good capillary refill  Skin: No rashes or ulcers, no bruising  Neuro:  Cranial nerves are grossly intact, moving all four extremities, no focal deficit, follows commands appropriately  Psych:  Good insight, oriented to person, place and time, alert, Nml Affect  LE: +2 pitting edema       DATA REVIEW            Laboratory and Imaging have been reviewed by me and are notable for  No results for input(s): CPK, CKMB, CHRISTIANO in the last 72 hours.   Recent Labs      06/05/18   0309  06/04/18   0550  06/03/18   0342   NA  140  140  141   K  4.4  4.4  4.2   CO2  23  22  25   BUN  68*  70*  66*   CREA  3.84*  3.83*  3.75*   GLU  110*  122*  121*   MG  1.2*  1.4*   --    WBC  7.8  8.6  9.0   HGB  8.4*  8.6*  8.8*   HCT  26.5*  27.4*  28.1*   PLT  147*  164  152             Manjit West, NP

## 2018-06-05 NOTE — PROGRESS NOTES
TRANSFER - IN REPORT:    Verbal report received from RN(name) on Rhiannon Chu  being received from cath(unit) for routine progression of care      Report consisted of patients Situation, Background, Assessment and   Recommendations(SBAR). Information from the following report(s) Procedure Summary was reviewed with the receiving nurse. Opportunity for questions and clarification was provided. Assessment completed upon patients arrival to unit and care assumed. TRANSFER - OUT REPORT:    Verbal report given to MarianneRN(name) on Rhiannon Chu  being transferred to [facility](unit) for routine progression of care       Report consisted of patients Situation, Background, Assessment and   Recommendations(SBAR). Information from the following report(s) Procedure Summary was reviewed with the receiving nurse.     Lines:   Triple Lumen 06/04/18 Right Internal jugular (Active)   Central Line Being Utilized Yes 6/5/2018  8:00 AM   Criteria for Appropriate Use Limited/no vessel suitable for conventional peripheral access 6/5/2018  8:00 AM   Site Assessment Clean, dry, & intact 6/5/2018  8:00 AM   Infiltration Assessment 0 6/5/2018  8:00 AM   Date of Last Dressing Change 06/05/18 6/5/2018 12:42 AM   Dressing Status Clean, dry, & intact 6/5/2018  8:00 AM   Dressing Type Disk with Chlorhexadine gluconate (CHG) 6/5/2018  8:00 AM   Action Taken Open ports on tubing capped 6/5/2018  8:00 AM   Proximal Hub Color/Line Status White 6/5/2018  8:00 AM   Positive Blood Return (Medial Site) Yes 6/5/2018  8:00 AM   Medial Hub Color/Line Status Blue 6/5/2018  8:00 AM   Positive Blood Return (Lateral Site) Yes 6/5/2018  8:00 AM   Distal Hub Color/Line Status Brown 6/5/2018  8:00 AM   Positive Blood Return (Site #3) Yes 6/5/2018  8:00 AM       Peripheral IV 05/30/18 Right Antecubital (Active)   Site Assessment Clean, dry, & intact 6/5/2018  8:00 AM   Phlebitis Assessment 0 6/5/2018  8:00 AM   Infiltration Assessment 0 6/5/2018  8:00 AM   Dressing Status Clean, dry, & intact 6/5/2018  8:00 AM   Dressing Type Transparent 6/5/2018  8:00 AM   Hub Color/Line Status Green 6/5/2018  8:00 AM   Action Taken Open ports on tubing capped 6/5/2018  4:23 AM   Alcohol Cap Used Yes 6/5/2018  4:23 AM        Opportunity for questions and clarification was provided.       Patient transported with:   O2 @ 2 liters

## 2018-06-05 NOTE — PROGRESS NOTES
E[facility]  [address] [facility], [place], [address] [place] [address]  [phone]      Medical Progress Note      NAME: Cody Kelley   :  1955  MRM:  [id number]    Date/Time: 2018  3:43 PM       Assessment and Plan:     Chronic respiratory failure with hypoxia / COPD, severe / ILD (interstitial lung disease) / Pulmonary HTN - POA, mod to severe plm HTN, laquita contribtes to here LARA and hypoxia. No treatment. Likely to progress. Palliative care. Continue O2     CAD (coronary Artery Disease) Native Artery / HTN (hypertension) - Continue amlodipine, coreg and Imdur, added hydralazine    Chronic diastolic heart failure - POA, driven by ILD, pulm HTN, progression to cor pulmonale in future. MEMs place to monitor pulm HTN. Continue bumex at higher dose, strict I's and O's, daily weights, low salt      Morbid obesity / JASEN on CPAP - continue CPAP, advise weight loss       Chest pain / Dyspnea - POA, chronic, unlikely AMI. Cath failed. Continue ASA, statin, BB, Imdur      Gastroparesis / Esophageal reflux - PPI      Normocytic anemia - POA, heme/onc consulted. Likely just chronic disease. s/p BMB; pathology pending, but she would be poor  Candidate for treatment of any disease if found.      DM (diabetes mellitus), type 2 with renal, vascular and neurological complications - Diabetic diet and counseling. SSI per protocol. Continue home . A1c Useless in setting of anemia.       CKD (chronic kidney disease) stage 4 - POA and likely to worse with cath. Nephrology consulted. May progress to ESRD. She is a poor candidate for HD. She has an AV fistula     Hx of deep venous thrombosis / Warfarin anticoagulation - Coumadin was held for cath, now back. diagnosed with DVT in 2017. Does not need bridge.      Hypomagnesemia - Replete and follow.     Gout - stable on allopurinol        Subjective:     Chief Complaint:  Still dyspnea    ROS:  (bold if positive, if negative)    SOB/LARA  Tolerating some PT  Tolerating Diet        Objective:     Last 24hrs VS reviewed since prior progress note.  Most recent are:    Visit Vitals    /84 (BP 1 Location: Right arm, BP Patient Position: At rest)    Pulse 75    Temp 98.1 °F (36.7 °C)    Resp 15    Ht 5' 10\" (1.778 m)    Wt 151.5 kg (334 lb)    SpO2 97%    BMI 47.92 kg/m2     SpO2 Readings from Last 6 Encounters:   06/05/18 97%   05/15/18 91%   04/03/18 96%   03/20/18 98%   01/24/18 98%   01/10/18 95%    O2 Flow Rate (L/min): 2 l/min     Intake/Output Summary (Last 24 hours) at 06/05/18 1543  Last data filed at 06/05/18 5412   Gross per 24 hour   Intake              500 ml   Output              380 ml   Net              120 ml        Physical Exam:    Gen:  Morbid obese, in no acute distress  HEENT:  Pink conjunctivae, PERRL, hearing intact to voice, moist mucous membranes  Neck:  Supple, without masses, thyroid non-tender  Resp:  No accessory muscle use, distant breath sounds without wheezes rales or rhonchi  Card:  No murmurs, distant S1, S2 without thrills, bruits, 2+ peripheral edema  Abd:  Soft, non-tender, obese distended, normoactive bowel sounds are present, no mass  Lymph:  No cervical or inguinal adenopathy  Musc:  No cyanosis or clubbing  Skin:  No rashes or ulcers, skin turgor is good, R wrist in brace  Neuro:  Cranial nerves are grossly intact, no focal motor weakness, follows commands   Psych:  Poor insight, oriented to person, place and time, alert    Telemetry reviewed:   normal sinus rhythm  __________________________________________________________________  Medications Reviewed: (see below)  Medications:     Current Facility-Administered Medications   Medication Dose Route Frequency    magnesium oxide (MAG-OX) tablet 400 mg  400 mg Oral BID    nitroprusside (NIPRIDE) 1 mg/10 mL compounded injection        lidocaine (XYLOCAINE) 10 mg/mL (1 %) injection 10-20 mL  10-20 mL IntraDERMal Multiple    hydrALAZINE (APRESOLINE) tablet 50 mg  50 mg Oral TID    sodium chloride (NS) flush 5-10 mL  5-10 mL IntraVENous Q8H    sodium chloride (NS) flush 5-10 mL  5-10 mL IntraVENous PRN    bumetanide (BUMEX) injection 2 mg  2 mg IntraVENous BID    sodium chloride (OCEAN) 0.65 % nasal squeeze bottle 2 Spray  2 Spray Both Nostrils Q2H PRN    HYDROmorphone (DILAUDID) injection 0.5 mg  0.5 mg IntraVENous Q4H PRN    0.9% sodium chloride infusion 250 mL  250 mL IntraVENous PRN    nitroglycerin (NITROSTAT) tablet 0.4 mg  0.4 mg SubLINGual PRN    oxyCODONE-acetaminophen (PERCOCET 7.5) 7.5-325 mg per tablet 1 Tab  1 Tab Oral Q6H PRN    Warfarin: pharmacy to dose    Other Rx Dosing/Monitoring    amLODIPine (NORVASC) tablet 10 mg  10 mg Oral DAILY    albuterol (PROVENTIL VENTOLIN) nebulizer solution 2.5 mg  2.5 mg Nebulization Q6H PRN    allopurinol (ZYLOPRIM) tablet 100 mg  100 mg Oral DAILY    aspirin delayed-release tablet 81 mg  81 mg Oral DAILY    atorvastatin (LIPITOR) tablet 80 mg  80 mg Oral QHS    calcitRIOL (ROCALTROL) capsule 0.25 mcg  0.25 mcg Oral Q M, W, F & SAT    carvedilol (COREG) tablet 25 mg  25 mg Oral BID WITH MEALS    ergocalciferol (ERGOCALCIFEROL) capsule 50,000 Units  50,000 Units Oral every Tuesday    fluticasone (FLONASE) 50 mcg/actuation nasal spray 2 Spray  2 Spray Both Nostrils DAILY PRN    hydrOXYzine HCl (ATARAX) tablet 25 mg  25 mg Oral TID PRN    isosorbide mononitrate ER (IMDUR) tablet 120 mg  120 mg Oral DAILY    pantoprazole (PROTONIX) tablet 40 mg  40 mg Oral ACB    polyethylene glycol (MIRALAX) packet 17 g  17 g Oral DAILY    senna-docusate (PERICOLACE) 8.6-50 mg per tablet 1 Tab  1 Tab Oral DAILY    sodium chloride (NS) flush 5-10 mL  5-10 mL IntraVENous Q8H    sodium chloride (NS) flush 5-10 mL  5-10 mL IntraVENous PRN    naloxone (NARCAN) injection 0.4 mg  0.4 mg IntraVENous PRN    glucose chewable tablet 16 g  4 Tab Oral PRN    dextrose (D50W) injection syrg 12.5-25 g  12.5-25 g IntraVENous PRN    glucagon (GLUCAGEN) injection 1 mg  1 mg IntraMUSCular PRN    acetaminophen (TYLENOL) tablet 650 mg  650 mg Oral Q4H PRN    diphenhydrAMINE (BENADRYL) capsule 25 mg  25 mg Oral Q4H PRN    ondansetron (ZOFRAN) injection 4 mg  4 mg IntraVENous Q4H PRN    insulin lispro (HUMALOG) injection   SubCUTAneous AC&HS        Lab Data Reviewed: (see below)  Lab Review:     Recent Labs      06/05/18   0309 06/04/18   0550  06/03/18   0342   WBC  7.8  8.6  9.0   HGB  8.4*  8.6*  8.8*   HCT  26.5*  27.4*  28.1*   PLT  147*  164  152     Recent Labs      06/05/18 0309 06/04/18   0550  06/03/18   1833  06/03/18   0342   NA  140  140   --   141   K  4.4  4.4   --   4.2   CL  106  105   --   106   CO2  23  22   --   25   GLU  110*  122*   --   121*   BUN  68*  70*   --   66*   CREA  3.84*  3.83*   --   3.75*   CA  9.0  9.1   --   8.8   MG  1.2*  1.4*   --    --    INR  1.4*  1.3*  1.3*   --      Lab Results   Component Value Date/Time    Glucose (POC) 168 (H) 06/05/2018 12:53 PM    Glucose (POC) 125 (H) 06/05/2018 06:50 AM    Glucose (POC) 198 (H) 06/04/2018 08:47 PM    Glucose (POC) 202 (H) 06/04/2018 04:19 PM    Glucose (POC) 143 (H) 06/04/2018 12:28 PM     No results for input(s): PH, PCO2, PO2, HCO3, FIO2 in the last 72 hours. Recent Labs      06/05/18 0309 06/04/18   0550  06/03/18   1833   INR  1.4*  1.3*  1.3*     All Micro Results     None          I have reviewed notes of prior 24hr.     Other pertinent lab: none    Total time spent with patient: [address] [place] [address] discussed with: Patient, Care Manager, Nursing Staff, Consultant/Specialist and >50% of time spent in counseling and coordination of care    Discussed:  Care Plan and D/C Planning    Prophylaxis:  Coumadin and H2B/PPI    Disposition:  HH PT, OT, RN           ___________________________________________________    Attending Physician: Alma Prajapati MD

## 2018-06-05 NOTE — PROGRESS NOTES
1930  Bedside and Verbal shift change report given to Nichole Frey (oncoming nurse) by Bethany Coronado RN (offgoing nurse). Report included the following information SBAR, Kardex, Procedure Summary, Intake/Output, MAR, Accordion and Recent Results. 0400  Patient has some concerns about the prednisone and benadryl. Visit Vitals    /76 (BP 1 Location: Right arm, BP Patient Position: At rest)    Pulse 72    Temp 99.1 °F (37.3 °C)    Resp 17    Wt 151.5 kg (334 lb)    SpO2 94%    BMI 47.92 kg/m2     0615  Patient refused Prednisone. She said that she stopped taking it 3 weeks ago. 0730  Bedside and Verbal shift change report given to [employer] (oncoming nurse) by Shad Salazar RN (offgoing nurse). Report included the following information SBAR, Kardex, Intake/Output, MAR, Accordion and Recent Results.

## 2018-06-05 NOTE — DISCHARGE SUMMARY
Physician Interim Summary   Patient ID:  Jose Ponce  [id number]  51 y.o.  1955    PCP: Lizzy Montes DO     Consults: cardiology, nephrology    Covering dates: 5/30/2018 through 6/4/2018    Admission Diagnoses: Chest pain  Chest pain    Hospital Course:  Ms. José Hagen is a 64 yo AAF with multiple medical problems including CAD, JASEN on CPAP, pulm HTN, obesity, DM, CKD, h/o DVT on coumadin, chronic HFpEF admitted with chest pain and anemia. Due to unclear of anemia, pt underwent BMB for which pathology is pending. Her chest pain, is thought to be recurrent anginal chest pain. She is scheduled for R and L heart cath on Tuesday to assess coronary arteries and to re-calibrate cardiomems. Her coumadin is currently on hold in preparation for cath. She is on heparin gtt as a bridge    Additional hospital course and discharge summary will be done by discharging physician.       Physician Interim Discharge Summary     Patient ID:  Jose Ponce  [id number]  29 y.o.  1955    Admit date: 5/30/2018    Admission Diagnoses: Chest pain  Chest pain    Current Diagnoses:    Principal Diagnosis     Chronic respiratory failure with hypoxia                                             Other Diagnoses  Active Problems:    CAD (coronary Artery Disease) Native Artery (2/16/2011)    JASEN on CPAP (2/16/2011)    Pulmonary HTN (3/21/2012)    HTN (hypertension) (10/1/2012)    Morbid obesity (10/1/2012)    Esophageal reflux (10/1/2012)    Gastroparesis (10/1/2012)    Chronic diastolic heart failure ([address] (10/5/2012)    Normocytic anemia (5/26/2013)    DM (diabetes mellitus), type 2 with renal complications ([address] (5/1/0369)    CKD (chronic kidney disease) stage 4, GFR 15-29 ml/min ([facility]) (2/10/2017)    COPD, severe ([address] (6/21/2017)    ILD (interstitial lung disease) ([address] (8/20/2017)    Warfarin anticoagulation (8/20/2017)    Chest pain (11/21/2017)    Hx of deep venous thrombosis (5/30/2018)     Hospital Course through 6/10/2018:   Chronic respiratory failure with hypoxia / COPD, severe / ILD (interstitial lung disease) / Pulmonary HTN - POA, mod to severe plm HTN laquita contributes to her LARA and hypoxia.  No curative treatment, just palliative treatment with diuretics.  Likely to progress. Palliative care consulted.  Continue Oxygen as needed.  CT 6/8 showed multiple complex abnormalities, which s parainfluenza vs progressive changes.  No fever, no leukocytosis.     Parainfluenza virus - Tested positive on resp screen.  Supportive care. Pulmonary consulted and placed her on doxycycline.  I would not have.      Chronic diastolic heart failure - POA, driven by ILD, pulm HTN, progression to cor pulmonale is in her future.  MEMs place to monitor pulm HTN. Continue bumex at higher dose, added metolazone, strict I's and O's, daily weights, low salt      DM (diabetes mellitus), type 2 with renal, vascular and neurological complications - Diabetic diet and counseling.  SSI per protocol. Not on home meds.  ESRD has likely potentiated her endogenous insulin. A1c Useless in setting of anemia.       CKD (chronic kidney disease) stage 4 - POA and a bit worse after cath, now slightly better.  Nephrology consulted. Salvatore Saxena to progress to ESRD. Perfecto Kang is a poor candidate for HD.   She has an AV fistula      CAD (coronary Artery Disease) Native Artery / HTN (hypertension) / Chest pain / Dyspnea - Pain chronic, unlikely AMI.  Cath failed. Continue ASA, statin, BB, Imdur, as medical management.  No further plans for cath or workup.      Nausea and anorexia / Gastroparesis / Esophageal reflux - PPI. Better this AM.  May be due to poor GI circulation resulting from excess diuresis.  Start PPI.  Clear liquid diet.  Monitor.  Normal lactic acid.      Morbid obesity / JASEN on CPAP - continue CPAP, advise weight loss       Normocytic anemia - POA, heme/onc consulted. Salvatore Saxena just chronic disease. s/p BMB, pathology negative.  EPO per renal.      Hx of deep venous thrombosis / Warfarin anticoagulation - Coumadin was held for cath, now back. diagnosed with DVT in 6/2017.  Does not need bridge.      Hypomagnesemia - Replete and follow.      Gout - stable on allopurinol     PCP: [address] [place] [address] Ne, DO    Consults: Cardiology, Pulmonary/Intensive care, Nephrology and Palliative Care    Significant Diagnostic Studies: See Hospital Course    Further details by discharging physician    Signed:  Massimo Mcfarlane MD  6/10/2018  12:59 PM

## 2018-06-06 ENCOUNTER — APPOINTMENT (OUTPATIENT)
Dept: GENERAL RADIOLOGY | Age: 63
DRG: 193 | End: 2018-06-06
Attending: NURSE PRACTITIONER
Payer: MEDICARE

## 2018-06-06 LAB
ANION GAP SERPL CALC-SCNC: 10 MMOL/L (ref 5–15)
BUN SERPL-MCNC: 72 MG/DL (ref 6–20)
BUN/CREAT SERPL: 17 (ref 12–20)
CALCIUM SERPL-MCNC: 9.2 MG/DL (ref 8.5–10.1)
CHLORIDE SERPL-SCNC: 104 MMOL/L (ref 97–108)
CO2 SERPL-SCNC: 24 MMOL/L (ref 21–32)
CREAT SERPL-MCNC: 4.13 MG/DL (ref 0.55–1.02)
ERYTHROCYTE [DISTWIDTH] IN BLOOD BY AUTOMATED COUNT: 16 % (ref 11.5–14.5)
GLUCOSE BLD STRIP.AUTO-MCNC: 199 MG/DL (ref 65–100)
GLUCOSE BLD STRIP.AUTO-MCNC: 203 MG/DL (ref 65–100)
GLUCOSE BLD STRIP.AUTO-MCNC: 212 MG/DL (ref 65–100)
GLUCOSE BLD STRIP.AUTO-MCNC: 289 MG/DL (ref 65–100)
GLUCOSE SERPL-MCNC: 297 MG/DL (ref 65–100)
HCT VFR BLD AUTO: 26.1 % (ref 35–47)
HGB BLD-MCNC: 8.4 G/DL (ref 11.5–16)
INR PPP: 1.3 (ref 0.9–1.1)
MAGNESIUM SERPL-MCNC: 1.9 MG/DL (ref 1.6–2.4)
MCH RBC QN AUTO: 30.2 PG (ref 26–34)
MCHC RBC AUTO-ENTMCNC: 32.2 G/DL (ref 30–36.5)
MCV RBC AUTO: 93.9 FL (ref 80–99)
NRBC # BLD: 0 K/UL (ref 0–0.01)
NRBC BLD-RTO: 0 PER 100 WBC
PLATELET # BLD AUTO: 146 K/UL (ref 150–400)
PMV BLD AUTO: 10.4 FL (ref 8.9–12.9)
POTASSIUM SERPL-SCNC: 4.4 MMOL/L (ref 3.5–5.1)
PROTHROMBIN TIME: 13.5 SEC (ref 9–11.1)
RBC # BLD AUTO: 2.78 M/UL (ref 3.8–5.2)
SERVICE CMNT-IMP: ABNORMAL
SODIUM SERPL-SCNC: 138 MMOL/L (ref 136–145)
WBC # BLD AUTO: 5.7 K/UL (ref 3.6–11)

## 2018-06-06 PROCEDURE — 74011250637 HC RX REV CODE- 250/637: Performed by: INTERNAL MEDICINE

## 2018-06-06 PROCEDURE — 74011000250 HC RX REV CODE- 250: Performed by: NURSE PRACTITIONER

## 2018-06-06 PROCEDURE — 97535 SELF CARE MNGMENT TRAINING: CPT

## 2018-06-06 PROCEDURE — 83735 ASSAY OF MAGNESIUM: CPT | Performed by: INTERNAL MEDICINE

## 2018-06-06 PROCEDURE — 36415 COLL VENOUS BLD VENIPUNCTURE: CPT | Performed by: INTERNAL MEDICINE

## 2018-06-06 PROCEDURE — 85610 PROTHROMBIN TIME: CPT | Performed by: NURSE PRACTITIONER

## 2018-06-06 PROCEDURE — 97161 PT EVAL LOW COMPLEX 20 MIN: CPT | Performed by: PHYSICAL THERAPIST

## 2018-06-06 PROCEDURE — 76450000000

## 2018-06-06 PROCEDURE — 85027 COMPLETE CBC AUTOMATED: CPT | Performed by: INTERNAL MEDICINE

## 2018-06-06 PROCEDURE — 77030029684 HC NEB SM VOL KT MONA -A

## 2018-06-06 PROCEDURE — 94660 CPAP INITIATION&MGMT: CPT

## 2018-06-06 PROCEDURE — 65660000000 HC RM CCU STEPDOWN

## 2018-06-06 PROCEDURE — 74011636637 HC RX REV CODE- 636/637: Performed by: INTERNAL MEDICINE

## 2018-06-06 PROCEDURE — 94640 AIRWAY INHALATION TREATMENT: CPT

## 2018-06-06 PROCEDURE — 71045 X-RAY EXAM CHEST 1 VIEW: CPT

## 2018-06-06 PROCEDURE — 74011000250 HC RX REV CODE- 250: Performed by: INTERNAL MEDICINE

## 2018-06-06 PROCEDURE — 97530 THERAPEUTIC ACTIVITIES: CPT | Performed by: PHYSICAL THERAPIST

## 2018-06-06 PROCEDURE — 80048 BASIC METABOLIC PNL TOTAL CA: CPT | Performed by: INTERNAL MEDICINE

## 2018-06-06 PROCEDURE — 74011250636 HC RX REV CODE- 250/636: Performed by: INTERNAL MEDICINE

## 2018-06-06 PROCEDURE — 97165 OT EVAL LOW COMPLEX 30 MIN: CPT

## 2018-06-06 PROCEDURE — 82962 GLUCOSE BLOOD TEST: CPT

## 2018-06-06 PROCEDURE — 74011250637 HC RX REV CODE- 250/637: Performed by: NURSE PRACTITIONER

## 2018-06-06 RX ADMIN — POLYETHYLENE GLYCOL (3350) 17 G: 17 POWDER, FOR SOLUTION ORAL at 08:34

## 2018-06-06 RX ADMIN — HYDRALAZINE HYDROCHLORIDE 50 MG: 25 TABLET, FILM COATED ORAL at 21:46

## 2018-06-06 RX ADMIN — HYDROMORPHONE HYDROCHLORIDE 0.5 MG: 2 INJECTION INTRAMUSCULAR; INTRAVENOUS; SUBCUTANEOUS at 21:45

## 2018-06-06 RX ADMIN — HYDROMORPHONE HYDROCHLORIDE 0.5 MG: 2 INJECTION INTRAMUSCULAR; INTRAVENOUS; SUBCUTANEOUS at 12:37

## 2018-06-06 RX ADMIN — ALBUTEROL SULFATE 2.5 MG: 2.5 SOLUTION RESPIRATORY (INHALATION) at 23:47

## 2018-06-06 RX ADMIN — NITROGLYCERIN 0.4 MG: 0.4 TABLET SUBLINGUAL at 13:47

## 2018-06-06 RX ADMIN — Medication 400 MG: at 18:00

## 2018-06-06 RX ADMIN — INSULIN LISPRO 7 UNITS: 100 INJECTION, SOLUTION INTRAVENOUS; SUBCUTANEOUS at 11:02

## 2018-06-06 RX ADMIN — Medication 10 ML: at 12:37

## 2018-06-06 RX ADMIN — WARFARIN SODIUM 3 MG: 2 TABLET ORAL at 18:00

## 2018-06-06 RX ADMIN — Medication 10 ML: at 06:00

## 2018-06-06 RX ADMIN — CALCITRIOL 0.25 MCG: 0.25 CAPSULE, LIQUID FILLED ORAL at 08:36

## 2018-06-06 RX ADMIN — ASPIRIN 81 MG: 81 TABLET, COATED ORAL at 08:36

## 2018-06-06 RX ADMIN — ALBUTEROL SULFATE 2.5 MG: 2.5 SOLUTION RESPIRATORY (INHALATION) at 14:28

## 2018-06-06 RX ADMIN — BUMETANIDE 2 MG: 0.25 INJECTION INTRAMUSCULAR; INTRAVENOUS at 18:00

## 2018-06-06 RX ADMIN — CARVEDILOL 25 MG: 12.5 TABLET, FILM COATED ORAL at 16:34

## 2018-06-06 RX ADMIN — HYDROMORPHONE HYDROCHLORIDE 0.5 MG: 2 INJECTION INTRAMUSCULAR; INTRAVENOUS; SUBCUTANEOUS at 00:12

## 2018-06-06 RX ADMIN — STANDARDIZED SENNA CONCENTRATE AND DOCUSATE SODIUM 1 TABLET: 8.6; 5 TABLET, FILM COATED ORAL at 08:34

## 2018-06-06 RX ADMIN — ISOSORBIDE MONONITRATE 120 MG: 60 TABLET ORAL at 08:35

## 2018-06-06 RX ADMIN — AMLODIPINE BESYLATE 10 MG: 5 TABLET ORAL at 08:35

## 2018-06-06 RX ADMIN — HYDROMORPHONE HYDROCHLORIDE 0.5 MG: 2 INJECTION INTRAMUSCULAR; INTRAVENOUS; SUBCUTANEOUS at 16:40

## 2018-06-06 RX ADMIN — HYDROMORPHONE HYDROCHLORIDE 0.5 MG: 2 INJECTION INTRAMUSCULAR; INTRAVENOUS; SUBCUTANEOUS at 08:34

## 2018-06-06 RX ADMIN — CARVEDILOL 25 MG: 12.5 TABLET, FILM COATED ORAL at 08:35

## 2018-06-06 RX ADMIN — INSULIN LISPRO 4 UNITS: 100 INJECTION, SOLUTION INTRAVENOUS; SUBCUTANEOUS at 08:37

## 2018-06-06 RX ADMIN — INSULIN LISPRO 3 UNITS: 100 INJECTION, SOLUTION INTRAVENOUS; SUBCUTANEOUS at 16:34

## 2018-06-06 RX ADMIN — Medication 400 MG: at 08:36

## 2018-06-06 RX ADMIN — ATORVASTATIN CALCIUM 80 MG: 20 TABLET, FILM COATED ORAL at 21:46

## 2018-06-06 RX ADMIN — ALBUTEROL SULFATE 2.5 MG: 2.5 SOLUTION RESPIRATORY (INHALATION) at 09:13

## 2018-06-06 RX ADMIN — BUMETANIDE 2 MG: 0.25 INJECTION INTRAMUSCULAR; INTRAVENOUS at 08:34

## 2018-06-06 RX ADMIN — Medication 10 ML: at 21:54

## 2018-06-06 RX ADMIN — HYDRALAZINE HYDROCHLORIDE 50 MG: 25 TABLET, FILM COATED ORAL at 08:36

## 2018-06-06 RX ADMIN — ALLOPURINOL 100 MG: 100 TABLET ORAL at 08:34

## 2018-06-06 RX ADMIN — HYDRALAZINE HYDROCHLORIDE 50 MG: 25 TABLET, FILM COATED ORAL at 15:39

## 2018-06-06 RX ADMIN — INSULIN LISPRO 2 UNITS: 100 INJECTION, SOLUTION INTRAVENOUS; SUBCUTANEOUS at 21:45

## 2018-06-06 NOTE — DIABETES MGMT
DTC Progress Note    Recommendations/ Comments: Noted pt received two doses of Solu-medrol yesterday and also diet advanced. If appropriate, please consider resuming basal insulin. Would consider starting with Lantus 22 units daily (0.15 units insulin/kg body wt) given pt current renal status. Current hospital DM medication:   -Humalog insulin resistant correction      Chart reviewed on Moe Chisholm for hyperglycemia. Patient is a 63 y.o. female with known history of Type 2 Diabetes on Novolog Mix 70/30 - tid for BG >140mg/dL at home. A1c:   Lab Results   Component Value Date/Time    Hemoglobin A1c 5.2 05/30/2018 03:30 PM    Hemoglobin A1c 5.8 11/22/2017 04:55 AM       Recent Glucose Results: Lab Results   Component Value Date/Time     (H) 06/06/2018 12:21 AM    GLUCPOC 212 (H) 06/06/2018 07:32 AM    GLUCPOC 307 (H) 06/05/2018 08:41 PM    GLUCPOC 213 (H) 06/05/2018 04:30 PM        Lab Results   Component Value Date/Time    Creatinine 4.13 (H) 06/06/2018 12:21 AM     Estimated Creatinine Clearance: 23.2 mL/min (based on Cr of 4.13). Active Orders   Diet    DIET DIABETIC CONSISTENT CARB Regular        PO intake: Patient Vitals for the past 72 hrs:   % Diet Eaten   06/05/18 1856 100 %       Will continue to follow as needed.     Thank you    Lenka Cardona RD, [address]

## 2018-06-06 NOTE — PROGRESS NOTES
1930  Bedside and Verbal shift change report given to Lali Frank (oncoming nurse) by Lauryn Foley RN (offgoing nurse). Report included the following information SBAR, Kardex, Intake/Output, MAR, Accordion and Recent Results. Visit Vitals    /76    Pulse 75    Temp 98.2 °F (36.8 °C)    Resp 16    Ht 5' 10\" (1.778 m)    Wt 151.5 kg (334 lb)    SpO2 100%    BMI 47.92 kg/m2     0730  Bedside and Verbal shift change report given to Fanny Bermudez RN (oncoming nurse) by Frankey Friar RN (offgoing nurse). Report included the following information SBAR, Kardex, Procedure Summary, Intake/Output, MAR, Accordion and Recent Results.

## 2018-06-06 NOTE — PROGRESS NOTES
Ezio [facility]  [address] [facility], [address] [place] [address]  [phone]      Medical Progress Note      NAME: Sun Alexander   :  1955  MRM:  [id number]    Date/Time: 2018  10:39 AM       Assessment and Plan:     Chronic respiratory failure with hypoxia / COPD, severe / ILD (interstitial lung disease) / Pulmonary HTN - POA, mod to severe plm HTN, laquita contributes to her LARA and hypoxia. No curative treatment, just diuretics. Likely to progress. Palliative care. Continue Oxygen as needed      CAD (coronary Artery Disease) Native Artery / HTN (hypertension) - Continue amlodipine, coreg and Imdur, added hydralazine, as medical management. No further plans for cath or workup.     Chronic diastolic heart failure - POA, driven by ILD, pulm HTN, progression to cor pulmonale is in her future. MEMs place to monitor pulm HTN. Continue bumex at higher dose, strict I's and O's, daily weights, low salt      DM (diabetes mellitus), type 2 with renal, vascular and neurological complications - Diabetic diet and counseling. SSI per protocol. Continue home . A1c Useless in setting of anemia.       CKD (chronic kidney disease) stage 4 - POA and a bit worse after cath. Nephrology consulted. Likely to progress to ESRD. She is a poor candidate for HD. She has an AV fistula      Morbid obesity / JASEN on CPAP - continue CPAP, advise weight loss       Chest pain / Dyspnea - POA, chronic, unlikely AMI. Cath failed. Continue ASA, statin, BB, Imdur       Gastroparesis / Esophageal reflux - PPI      Normocytic anemia - POA, heme/onc consulted. Likely just chronic disease. s/p BMB; pathology pending, but she would be poor  Candidate for treatment of any disease if found.      Hx of deep venous thrombosis / Warfarin anticoagulation - Coumadin was held for cath, now back. diagnosed with DVT in 2017.  Does not need bridge.      Hypomagnesemia - Replete and follow.     Gout - stable on allopurinol         Subjective:     Chief Complaint:  Still weak, dyspnea, depression    ROS:  (bold if positive, if negative)    SOB/LARA  Tolerating some PT  Tolerating Diet        Objective:     Last 24hrs VS reviewed since prior progress note.  Most recent are:    Visit Vitals    /68    Pulse 77    Temp 97.6 °F (36.4 °C)    Resp 18    Ht 5' 10\" (1.778 m)    Wt 151 kg (332 lb 14.3 oz)    SpO2 97%    BMI 47.77 kg/m2     SpO2 Readings from Last 6 Encounters:   06/06/18 97%   05/15/18 91%   04/03/18 96%   03/20/18 98%   01/24/18 98%   01/10/18 95%    O2 Flow Rate (L/min): 2 l/min     Intake/Output Summary (Last 24 hours) at 06/06/18 1039  Last data filed at 06/06/18 0918   Gross per 24 hour   Intake             1350 ml   Output             1600 ml   Net             -250 ml        Physical Exam:    Gen:  Morbid obese, in no acute distress  HEENT:  Pink conjunctivae, PERRL, hearing intact to voice, moist mucous membranes  Neck:  Supple, without masses, thyroid non-tender  Resp:  No accessory muscle use, distant breath sounds without wheezes rales or rhonchi  Card:  No murmurs, distant S1, S2 without thrills, bruits, 2+ peripheral edema  Abd:  Soft, non-tender, obese distended, normoactive bowel sounds are present, no mass  Lymph:  No cervical or inguinal adenopathy  Musc:  No cyanosis or clubbing  Skin:  No rashes or ulcers, skin turgor is good, R wrist in brace  Neuro:  Cranial nerves are grossly intact, no focal motor weakness, follows commands   Psych:  Poor insight, oriented to person, place and time, alert    Telemetry reviewed:   normal sinus rhythm  __________________________________________________________________  Medications Reviewed: (see below)  Medications:     Current Facility-Administered Medications   Medication Dose Route Frequency    magnesium oxide (MAG-OX) tablet 400 mg  400 mg Oral BID    lidocaine (XYLOCAINE) 10 mg/mL (1 %) injection 10-20 mL  10-20 mL IntraDERMal Multiple    hydrALAZINE (APRESOLINE) tablet 50 mg  50 mg Oral TID    sodium chloride (NS) flush 5-10 mL  5-10 mL IntraVENous Q8H    sodium chloride (NS) flush 5-10 mL  5-10 mL IntraVENous PRN    bumetanide (BUMEX) injection 2 mg  2 mg IntraVENous BID    sodium chloride (OCEAN) 0.65 % nasal squeeze bottle 2 Spray  2 Spray Both Nostrils Q2H PRN    HYDROmorphone (DILAUDID) injection 0.5 mg  0.5 mg IntraVENous Q4H PRN    0.9% sodium chloride infusion 250 mL  250 mL IntraVENous PRN    nitroglycerin (NITROSTAT) tablet 0.4 mg  0.4 mg SubLINGual PRN    oxyCODONE-acetaminophen (PERCOCET 7.5) 7.5-325 mg per tablet 1 Tab  1 Tab Oral Q6H PRN    Warfarin: pharmacy to dose    Other Rx Dosing/Monitoring    amLODIPine (NORVASC) tablet 10 mg  10 mg Oral DAILY    albuterol (PROVENTIL VENTOLIN) nebulizer solution 2.5 mg  2.5 mg Nebulization Q6H PRN    allopurinol (ZYLOPRIM) tablet 100 mg  100 mg Oral DAILY    aspirin delayed-release tablet 81 mg  81 mg Oral DAILY    atorvastatin (LIPITOR) tablet 80 mg  80 mg Oral QHS    calcitRIOL (ROCALTROL) capsule 0.25 mcg  0.25 mcg Oral Q M, W, F & SAT    carvedilol (COREG) tablet 25 mg  25 mg Oral BID WITH MEALS    ergocalciferol (ERGOCALCIFEROL) capsule 50,000 Units  50,000 Units Oral every Tuesday    fluticasone (FLONASE) 50 mcg/actuation nasal spray 2 Spray  2 Spray Both Nostrils DAILY PRN    hydrOXYzine HCl (ATARAX) tablet 25 mg  25 mg Oral TID PRN    isosorbide mononitrate ER (IMDUR) tablet 120 mg  120 mg Oral DAILY    pantoprazole (PROTONIX) tablet 40 mg  40 mg Oral ACB    polyethylene glycol (MIRALAX) packet 17 g  17 g Oral DAILY    senna-docusate (PERICOLACE) 8.6-50 mg per tablet 1 Tab  1 Tab Oral DAILY    sodium chloride (NS) flush 5-10 mL  5-10 mL IntraVENous Q8H    sodium chloride (NS) flush 5-10 mL  5-10 mL IntraVENous PRN    naloxone (NARCAN) injection 0.4 mg  0.4 mg IntraVENous PRN    glucose chewable tablet 16 g  4 Tab Oral PRN    dextrose (D50W) injection syrg 12.5-25 g  12.5-25 g IntraVENous PRN    glucagon (GLUCAGEN) injection 1 mg  1 mg IntraMUSCular PRN    acetaminophen (TYLENOL) tablet 650 mg  650 mg Oral Q4H PRN    diphenhydrAMINE (BENADRYL) capsule 25 mg  25 mg Oral Q4H PRN    ondansetron (ZOFRAN) injection 4 mg  4 mg IntraVENous Q4H PRN    insulin lispro (HUMALOG) injection   SubCUTAneous AC&HS        Lab Data Reviewed: (see below)  Lab Review:     Recent Labs      06/06/18   0021 06/05/18   0309 06/04/18   0550   WBC  5.7  7.8  8.6   HGB  8.4*  8.4*  8.6*   HCT  26.1*  26.5*  27.4*   PLT  146*  147*  164     Recent Labs      06/06/18   0021 06/05/18   0309 06/04/18   0550   NA  138  140  140   K  4.4  4.4  4.4   CL  104  106  105   CO2  24  23  22   GLU  297*  110*  122*   BUN  72*  68*  70*   CREA  4.13*  3.84*  3.83*   CA  9.2  9.0  9.1   MG  1.9  1.2*  1.4*   INR  1.3*  1.4*  1.3*     Lab Results   Component Value Date/Time    Glucose (POC) 212 (H) 06/06/2018 07:32 AM    Glucose (POC) 307 (H) 06/05/2018 08:41 PM    Glucose (POC) 213 (H) 06/05/2018 04:30 PM    Glucose (POC) 168 (H) 06/05/2018 12:53 PM    Glucose (POC) 125 (H) 06/05/2018 06:50 AM     No results for input(s): PH, PCO2, PO2, HCO3, FIO2 in the last 72 hours. Recent Labs      06/06/18   0021 06/05/18   0309 06/04/18   0550   INR  1.3*  1.4*  1.3*     All Micro Results     None          I have reviewed notes of prior 24hr.     Other pertinent lab: none    Total time spent with patient: [address] discussed with: Patient, Care Manager, Nursing Staff, Consultant/Specialist and >50% of time spent in counseling and coordination of care    Discussed:  Care Plan and D/C Planning    Prophylaxis:  Hep SQ and H2B/PPI    Disposition:  HH PT, OT, RN           ___________________________________________________    Attending Physician: Porsha Will MD

## 2018-06-06 NOTE — PROGRESS NOTES
[address] [facility] [place]  YOB: 1955          Assessment & Plan:   CKD 4  · Cr slightly worse  · She has high filling pressures and needs diuresis  · Cont IV Loops  · Daily wts  · Has AVF which is ready for use    CP  · On oxygen  · Cath 6 5 18:rev    HTN   · Reasonably controlled:Amlodipine,Corge,Loops,Hydralazine    SHPT   · Calcitriol    Anemia of CKD   · On EPO  · S/p BMB         Subjective:   CC: f/u CKD  HPI: CKD:cr is slightly worse  Remains sob,  Edema better  S/P Cath: rev. High filling pressures  DW FAMILY.   ROS: no n/v/ neg for 10 sys except in HPI  Current Facility-Administered Medications   Medication Dose Route Frequency    warfarin (COUMADIN) tablet 3 mg  3 mg Oral ONCE    magnesium oxide (MAG-OX) tablet 400 mg  400 mg Oral BID    lidocaine (XYLOCAINE) 10 mg/mL (1 %) injection 10-20 mL  10-20 mL IntraDERMal Multiple    hydrALAZINE (APRESOLINE) tablet 50 mg  50 mg Oral TID    sodium chloride (NS) flush 5-10 mL  5-10 mL IntraVENous Q8H    sodium chloride (NS) flush 5-10 mL  5-10 mL IntraVENous PRN    bumetanide (BUMEX) injection 2 mg  2 mg IntraVENous BID    sodium chloride (OCEAN) 0.65 % nasal squeeze bottle 2 Spray  2 Spray Both Nostrils Q2H PRN    HYDROmorphone (DILAUDID) injection 0.5 mg  0.5 mg IntraVENous Q4H PRN    0.9% sodium chloride infusion 250 mL  250 mL IntraVENous PRN    nitroglycerin (NITROSTAT) tablet 0.4 mg  0.4 mg SubLINGual PRN    oxyCODONE-acetaminophen (PERCOCET 7.5) 7.5-325 mg per tablet 1 Tab  1 Tab Oral Q6H PRN    Warfarin: pharmacy to dose    Other Rx Dosing/Monitoring    amLODIPine (NORVASC) tablet 10 mg  10 mg Oral DAILY    albuterol (PROVENTIL VENTOLIN) nebulizer solution 2.5 mg  2.5 mg Nebulization Q6H PRN    allopurinol (ZYLOPRIM) tablet 100 mg  100 mg Oral DAILY    aspirin delayed-release tablet 81 mg  81 mg Oral DAILY    atorvastatin (LIPITOR) tablet 80 mg  80 mg Oral QHS  calcitRIOL (ROCALTROL) capsule 0.25 mcg  0.25 mcg Oral Q M, W,  & SAT    carvedilol (COREG) tablet 25 mg  25 mg Oral BID WITH MEALS    ergocalciferol (ERGOCALCIFEROL) capsule 50,000 Units  50,000 Units Oral every Tuesday    fluticasone (FLONASE) 50 mcg/actuation nasal spray 2 Spray  2 Spray Both Nostrils DAILY PRN    hydrOXYzine HCl (ATARAX) tablet 25 mg  25 mg Oral TID PRN    isosorbide mononitrate ER (IMDUR) tablet 120 mg  120 mg Oral DAILY    pantoprazole (PROTONIX) tablet 40 mg  40 mg Oral ACB    polyethylene glycol (MIRALAX) packet 17 g  17 g Oral DAILY    senna-docusate (PERICOLACE) 8.6-50 mg per tablet 1 Tab  1 Tab Oral DAILY    sodium chloride (NS) flush 5-10 mL  5-10 mL IntraVENous Q8H    sodium chloride (NS) flush 5-10 mL  5-10 mL IntraVENous PRN    naloxone (NARCAN) injection 0.4 mg  0.4 mg IntraVENous PRN    glucose chewable tablet 16 g  4 Tab Oral PRN    dextrose (D50W) injection syrg 12.5-25 g  12.5-25 g IntraVENous PRN    glucagon (GLUCAGEN) injection 1 mg  1 mg IntraMUSCular PRN    acetaminophen (TYLENOL) tablet 650 mg  650 mg Oral Q4H PRN    diphenhydrAMINE (BENADRYL) capsule 25 mg  25 mg Oral Q4H PRN    ondansetron (ZOFRAN) injection 4 mg  4 mg IntraVENous Q4H PRN    insulin lispro (HUMALOG) injection   SubCUTAneous AC&HS          Objective:     Vitals:  Blood pressure 140/68, pulse 77, temperature 97.6 °F (36.4 °C), resp. rate 18, height 5' 10\" (1.778 m), weight 151 kg (332 lb 14.3 oz), SpO2 97 %. Temp (24hrs), Av.9 °F (36.6 °C), Min:97.5 °F (36.4 °C), Max:98.2 °F (36.8 °C)      Intake and Output:  701 -  1900  In: 480 [P.O.:480]  Out: -   1901 -  0700  In: 3107 [P.O.:1090; I.V.:40]  Out:  [Urine:]    Physical Exam:               GENERAL ASSESSMENT: NAD  CHEST: CTA  HEART: S1S2  ABDOMEN: Soft,NT  SKIN DRY  JT: No acute findings  EXTREMITY: trace EDEMA          Data Review      No results for input(s): TNIPOC in the last 72 hours.     No lab exists for component: ITNL   No results for input(s): CPK, CKMB, TROIQ in the last 72 hours. Recent Labs      06/06/18   0021  06/05/18   0309  06/04/18   0550   NA  138  140  140   K  4.4  4.4  4.4   CL  104  106  105   CO2  24  23  22   BUN  72*  68*  70*   CREA  4.13*  3.84*  3.83*   GLU  297*  110*  122*   MG  1.9  1.2*  1.4*   CA  9.2  9.0  9.1   WBC  5.7  7.8  8.6   HGB  8.4*  8.4*  8.6*   HCT  26.1*  26.5*  27.4*   PLT  146*  147*  164      Recent Labs      06/06/18   0021  06/05/18   0309  06/04/18   1841  06/04/18   1539  06/04/18   0550   INR  1.3*  1.4*   --    --   1.3*   PTP  13.5*  13.6*   --    --   13.1*   APTT   --   50.6*  41.2*  >130.0*  39.9*     Needs: urine analysis, urine sodium, protein and creatinine  Lab Results   Component Value Date/Time    Sodium urine, random 25 11/10/2014 12:40 PM    Creatinine, urine 145.29 03/04/2017 05:01 AM         : Aura Wu MD  6/6/2018        [facility]:  [email or web address]  [place]iana Chrissy[place] office:  [address] [place] [address], [address] [place] [address],[address]  [address]  Phone: [phone]  Fax :     [phone]    [place] office:  [address] [facility], [address] [facility]  Phone - [phone]  Fax - [phone]

## 2018-06-06 NOTE — PROGRESS NOTES
[address] [facility] Oncology at [facility]  [phone]    Hematology / Oncology Progress Note    Reason for Visit:   Marisa Flores is a 63 y.o. female who is seen in consultation at the request of Dr. Boris House for evaluation of anemia. History of Present Illness:   Ms. Reina Lopez is a 62 y/o female with DMII, CAD, Stage IV CKD admitted with chest pain, found to have severe anemia. Patient states she has had worsening anemia for several months to years. Last years, she states she received Procrit intermittently, but was in and out of the hospital quite a bit. Her last blood transfusion was in 2017. She was given parenteral iron infusions x 2 in Dec 2017, and then she was started on Procrit in 2018. Since then, she has been receiving the Procrit regularly every 2 weeks. However, her anemia has not responded and patient states the dose of Procrit was even increased. This has been managed by her Nephrologist, Dr. Peggy Mercedes. She was in the infusion room when she had mentioned recurrent daily CP requiring use of nitroglycerin. She was therefore referred to the ER for further evaluation. Hgb was 7.8 on admission and is 7.5 today. Cardiology and Nephrology have evaluated patient. Cardiology feels her anginal symptoms may be exacerbated by the severe anemia. She denies melena/hematochezia, has undergone prior GI workup which was negative. She used to see Dr. Aileen Moon in  for elevated free light chains, but no evidence of myeloma. Her Hgb at that time was 9.8. She states she still has the CP and SOB. She also reports having COPD and interstitial lung disease, on home O2 for the past several years. Of note, she reports having a history of childhood leukemia while she was in the 2nd grade. She states she was treated in [place] with chemotherapy. She does not have many other details as her mother is . She also reports a h/o sickle cell trait and G6PD.  She has h/o RLE DVT in 2017 and is on Warfarin for this. Her INR is 3.8. Interval History:   Yesterday, patient underwent RHC, but LHC was unsuccessful. Continues with some SOB and chest pain. However, she is trying to use less nitro overall. No further complaints at present.  at bedside.       Past Medical History:   Diagnosis Date     Sleep Apnea 2/16/2011    Aortic aneurysm (HCC)     CAD (coronary Artery Disease) Native Artery 2/16/2011    CKD (chronic kidney disease) stage 4, GFR 15-29 ml/min ([address] 2/10/2017    COPD (chronic obstructive pulmonary disease) (HCC)     Diabetic gastroparesis (HCC)     Diastolic heart failure ([address] 10/5/2012    DM type 2 causing neurological disease ([address]     DM type 2 causing renal disease ([address]     DM type 2 causing vascular disease ([address]     Esophageal stricture 2012    dialted Dr. Marissa rCespo G6PD deficiency ([address]     trait    GERD (gastroesophageal reflux disease)     Gout     ILD (interstitial lung disease) ([address]     open lung bx CJW 2010    Morbid obesity ([address]     OA (osteoarthritis)     Ovarian cancer ([address]     cervical and uterine    Rheumatoid arteritis     S/P left pulmonary artery pressure sensor implant placement 8/31/2017    Cardiomems     Tobacco use disorder 3/21/2012    Uterine cervix cancer ([address]     Vitamin D deficiency 8/9/2013      Past Surgical History:   Procedure Laterality Date    CARDIAC SURG PROCEDURE UNLIST      stents    COLONOSCOPY N/A 6/24/2016    COLONOSCOPY performed by Bulmaro Erickson MD at [address] HX APPENDECTOMY      HX CARPAL TUNNEL RELEASE      bilateral    HX CHOLECYSTECTOMY      HX HERNIA REPAIR      HX HYSTERECTOMY      HX ORTHOPAEDIC  11/12/12    right knee replacement    HX TONSIL AND ADENOIDECTOMY      UPPER GI ENDOSCOPY,VINOD W GUIDE  6/24/2016           Social History   Substance Use Topics    Smoking status: Former Smoker     Packs/day: 0.50     Years: 41.00     Types: Cigarettes     Quit date: 11/9/2014  Smokeless tobacco: Never Used    Alcohol use No      Family History   Problem Relation Age of Onset    Heart Disease Mother     Heart Disease Brother      Current Facility-Administered Medications   Medication Dose Route Frequency    magnesium oxide (MAG-OX) tablet 400 mg  400 mg Oral BID    lidocaine (XYLOCAINE) 10 mg/mL (1 %) injection 10-20 mL  10-20 mL IntraDERMal Multiple    hydrALAZINE (APRESOLINE) tablet 50 mg  50 mg Oral TID    sodium chloride (NS) flush 5-10 mL  5-10 mL IntraVENous Q8H    sodium chloride (NS) flush 5-10 mL  5-10 mL IntraVENous PRN    bumetanide (BUMEX) injection 2 mg  2 mg IntraVENous BID    sodium chloride (OCEAN) 0.65 % nasal squeeze bottle 2 Spray  2 Spray Both Nostrils Q2H PRN    HYDROmorphone (DILAUDID) injection 0.5 mg  0.5 mg IntraVENous Q4H PRN    0.9% sodium chloride infusion 250 mL  250 mL IntraVENous PRN    nitroglycerin (NITROSTAT) tablet 0.4 mg  0.4 mg SubLINGual PRN    oxyCODONE-acetaminophen (PERCOCET 7.5) 7.5-325 mg per tablet 1 Tab  1 Tab Oral Q6H PRN    Warfarin: pharmacy to dose    Other Rx Dosing/Monitoring    amLODIPine (NORVASC) tablet 10 mg  10 mg Oral DAILY    albuterol (PROVENTIL VENTOLIN) nebulizer solution 2.5 mg  2.5 mg Nebulization Q6H PRN    allopurinol (ZYLOPRIM) tablet 100 mg  100 mg Oral DAILY    aspirin delayed-release tablet 81 mg  81 mg Oral DAILY    atorvastatin (LIPITOR) tablet 80 mg  80 mg Oral QHS    calcitRIOL (ROCALTROL) capsule 0.25 mcg  0.25 mcg Oral Q M, W, F & SAT    carvedilol (COREG) tablet 25 mg  25 mg Oral BID WITH MEALS    ergocalciferol (ERGOCALCIFEROL) capsule 50,000 Units  50,000 Units Oral every Tuesday    fluticasone (FLONASE) 50 mcg/actuation nasal spray 2 Spray  2 Spray Both Nostrils DAILY PRN    hydrOXYzine HCl (ATARAX) tablet 25 mg  25 mg Oral TID PRN    isosorbide mononitrate ER (IMDUR) tablet 120 mg  120 mg Oral DAILY    pantoprazole (PROTONIX) tablet 40 mg  40 mg Oral ACB    polyethylene glycol (MIRALAX) packet 17 g  17 g Oral DAILY    senna-docusate (PERICOLACE) 8.6-50 mg per tablet 1 Tab  1 Tab Oral DAILY    sodium chloride (NS) flush 5-10 mL  5-10 mL IntraVENous Q8H    sodium chloride (NS) flush 5-10 mL  5-10 mL IntraVENous PRN    naloxone (NARCAN) injection 0.4 mg  0.4 mg IntraVENous PRN    glucose chewable tablet 16 g  4 Tab Oral PRN    dextrose (D50W) injection syrg 12.5-25 g  12.5-25 g IntraVENous PRN    glucagon (GLUCAGEN) injection 1 mg  1 mg IntraMUSCular PRN    acetaminophen (TYLENOL) tablet 650 mg  650 mg Oral Q4H PRN    diphenhydrAMINE (BENADRYL) capsule 25 mg  25 mg Oral Q4H PRN    ondansetron (ZOFRAN) injection 4 mg  4 mg IntraVENous Q4H PRN    insulin lispro (HUMALOG) injection   SubCUTAneous AC&HS      Allergies   Allergen Reactions    Contrast Dye [Iodine] Anaphylaxis     Tolerates when pre medicated w/ IV solumedrol and benadryl prior to procedure     Levaquin [Levofloxacin] Nausea and Vomiting    Morphine Hives and Itching        Review of Systems: A complete review of systems was obtained, negative except as described above. Physical Exam:     Visit Vitals    /68    Pulse 77    Temp 97.6 °F (36.4 °C)    Resp 18    Ht 5' 10\" (1.778 m)    Wt 332 lb 14.3 oz (151 kg)    SpO2 97%    BMI 47.77 kg/m2     ECOG PS: 2-3  General: Well developed, no acute distress, obese  Eyes: PERRLA, EOMI, anicteric sclerae  HENT: Atraumatic, OP clear, TMs intact without erythema  Neck: Supple  Respiratory: CTAB, normal respiratory effort, On supplemental O2  CV: Normal rate, regular rhythm, no murmurs, no peripheral edema  GI: Soft, nontender, nondistended, no masses. Unable to appreciate HSM due to body habitus. MS: Digits without clubbing or cyanosis. Skin: No rashes, ecchymoses, or petechiae. Normal temperature, turgor, and texture. Neuro/Psych: Alert, oriented. 5/5 strength in all 4 extremities. Appropriate affect, normal judgment/insight.     Results:     Lab Results Component Value Date/Time    WBC 5.7 06/06/2018 12:21 AM    HGB 8.4 (L) 06/06/2018 12:21 AM    HCT 26.1 (L) 06/06/2018 12:21 AM    PLATELET 764 (L) 34/76/4731 12:21 AM    MCV 93.9 06/06/2018 12:21 AM    ABS. NEUTROPHILS 4.8 06/05/2018 03:09 AM    Hemoglobin (POC) 9.9 (L) 07/21/2013 09:51 PM    Hematocrit (POC) 29 (L) 07/21/2013 09:51 PM     Lab Results   Component Value Date/Time    Sodium 138 06/06/2018 12:21 AM    Potassium 4.4 06/06/2018 12:21 AM    Chloride 104 06/06/2018 12:21 AM    CO2 24 06/06/2018 12:21 AM    Glucose 297 (H) 06/06/2018 12:21 AM    BUN 72 (H) 06/06/2018 12:21 AM    Creatinine 4.13 (H) 06/06/2018 12:21 AM    GFR est AA 13 (L) 06/06/2018 12:21 AM    GFR est non-AA 11 (L) 06/06/2018 12:21 AM    Calcium 9.2 06/06/2018 12:21 AM    Sodium (POC) 140 07/21/2013 09:51 PM    Potassium (POC) 3.9 07/21/2013 09:51 PM    Chloride (POC) 109 (H) 07/21/2013 09:51 PM    Glucose (POC) 289 (H) 06/06/2018 10:47 AM    BUN (POC) 30 (H) 07/21/2013 09:51 PM    Creatinine (POC) 2.1 (H) 07/21/2013 09:51 PM    Calcium, ionized (POC) 1.19 07/21/2013 09:51 PM     Lab Results   Component Value Date/Time    Bilirubin, total 0.5 11/21/2017 12:40 PM    ALT (SGPT) 19 11/21/2017 12:40 PM    AST (SGOT) 24 11/21/2017 12:40 PM    Alk. phosphatase 94 11/21/2017 12:40 PM    Protein, total 6.8 11/21/2017 12:40 PM    Albumin 3.2 (L) 11/21/2017 12:40 PM    Globulin 3.6 11/21/2017 12:40 PM     Lab Results   Component Value Date/Time    Iron 55 05/31/2018 02:08 PM    TIBC 201 (L) 05/31/2018 02:08 PM    Iron % saturation 27 05/31/2018 02:08 PM    Ferritin 536 (H) 05/31/2018 02:08 PM       6/1/2018 Bone Marrow bx: The bone marrow is normocellular for age to reveal trilineage hematopoiesis with adequate maturation. No significant dysplasia is identified in any cell line. Blasts are not increased. There is no increase in plasma cells. No atypical lymphocytic infiltrates are identified.  Overall findings are nonspecific with no morphologic evidence of myelodysplastic syndrome or aplastic anemia. Cytogenetics and FISH analysis for MDS panel is pending and the results will be issued as an addendum. Imaging:     Radiology report(s) reviewed. Assessment & Plan:   Ashlyn Feliciano is a 63 y.o. female with CAD/CHF, CKD admitted with CP and severe chronic anemia. 1. Normocytic anemia: Anemia of chronic renal disease, but not responding to EPO; unsure if she is receive Erythropoietin or Darbepoietin. When one agent shows no response, it may be worth switching to the alternate form. No evidence of hemolysis. Retic is inappropriately low. Recheck iron profile reveals normal Ferritin. Given lack of response to EPO thus far as well as multiple other complicating factors including CAD/angina, evaluated further with bone marrow biopsy. Bone marrow biopsy result is normal with no abnormal cells or dysplasia. -- At this point, I will discuss with Nephrology on utility of switching to a different EPO stimulating agent. No further hematology workup at this time, and I will sign off. But please do not hesitate to contact me with questions/concerns. 2. Stage IV CKD: Followed by Dr. Meri Kramer as outpatient. Has AVF in place. 3. CAD/diastolic CHF: On Coreg, ASA, Imdur, Metolazone, Lipitor, Bumex. 4. Chest pain / SOB: Seems consistent with chronic angina. Currently with negative troponin. RHC performed, but LHC on 6/5 unsuccessful. 5. COPD, Interstitial lung disease: On supplemental home oxygen @ 2L/min      6. DMII: Managed by PCP and currently stable. 7. History of RLE DVT: On Warfarin, which was held for procedures and has now been restarted.       Plan reviewed with Dr Eduardo Holley By: Mirian Maier MD     June 6, 2018

## 2018-06-06 NOTE — PROGRESS NOTES
Problem: Self Care Deficits Care Plan (Adult)  Goal: *Acute Goals and Plan of Care (Insert Text)  Occupational Therapy Goals  Initiated 6/6/2018  1. Patient will perform grooming with supervision/set-up standing at sink >3 minutes VSS within 7 day(s). 2.  Patient will perform lower body dressing with minimal assistance/contact guard assist using AE as needed within 7 day(s). 3.  Patient will perform toilet transfers with supervision/set-up within 7 day(s). 4.  Patient will perform all aspects of toileting with supervision/set-up within 7 day(s). 5.  Patient will participate in upper extremity therapeutic exercise/activities with independence for 5 minutes VSS within 7 day(s). 6.  Patient will utilize energy conservation techniques during functional activities with verbal cues within 7 day(s). Occupational Therapy EVALUATION  Patient: Catrachita Schmid (76 y.o. female)  Date: 6/6/2018  Primary Diagnosis: Chest pain  Chest pain        Precautions:       ASSESSMENT :  Cleared by RN to see pt for therapy session. Based on the objective data described below, the patient presents with decreased activity tolerance, dyspnea, decreased balance and strength following admission for chest pain. Pt lives with her , previously I with ADLs apart from occasional assistance for LB dressing, ambulates short distances at home with or without cane/walker. Reports she uses 2 L supplemental O2 at home. Received long sitting in bed, initially not agreeable to be OOB but agreeable to participate with encouragement and needing to use restroom. She reports taking short bath at sink in standing prior to therapy, O2 sats dropping to 87% while on 2 L. At rest pt's O2 sats 96%, . Bed mobility performed with modified independence, functional transfers with standby assistance, pt with somewhat antalgic gait due to knee pain. Performed toilet transfer and brief standing grooming ADL with standby assistance.  SpO2 90-91% after activity on RA, increased quickly to 99% on 2 L with seated rest. Pt returned to bed at end of session, call bell in reach and needs met. Pt is below her functional baseline due to decreased endurance at strength, reports feeling more fatigued and SOB than usual with light actiity. Patient will benefit from skilled intervention to address the above impairments. Recommend HHOT at discharge. Patients rehabilitation potential is considered to be Good  Factors which may influence rehabilitation potential include:   [x]             None noted  []             Mental ability/status  []             Medical condition  []             Home/family situation and support systems  []             Safety awareness  []             Pain tolerance/management  []             Other:      PLAN :  Recommendations and Planned Interventions:  [x]               Self Care Training                  [x]        Therapeutic Activities  [x]               Functional Mobility Training    []        Cognitive Retraining  [x]               Therapeutic Exercises           [x]        Endurance Activities  [x]               Balance Training                   []        Neuromuscular Re-Education  []               Visual/Perceptual Training     [x]   Home Safety Training  [x]               Patient Education                 [x]        Family Training/Education  []               Other (comment):    Frequency/Duration: Patient will be followed by occupational therapy 5 times a week to address goals.   Discharge Recommendations: Home Health  Further Equipment Recommendations for Discharge: none     SUBJECTIVE:   Patient stated I feel more tired than usual.    OBJECTIVE DATA SUMMARY:   HISTORY:   Past Medical History:   Diagnosis Date     Sleep Apnea 2/16/2011    [facility]     CAD (coronary Artery Disease) Native Artery 2/16/2011    CKD (chronic kidney disease) stage 4, GFR 15-29 ml/min ([address] 2/10/2017    COPD (chronic obstructive pulmonary disease) ([address]     Diabetic gastroparesis ([address]     Diastolic heart failure ([address] 10/5/2012    DM type 2 causing neurological disease ([address]     DM type 2 causing renal disease ([address]     DM type 2 causing vascular disease ([address]     Esophageal stricture 2012    dialted Dr. Nanci Ortez G6PD deficiency [facility])     trait    GERD (gastroesophageal reflux disease)     Gout     ILD (interstitial lung disease) ([address]     open lung bx CJW 2010    Morbid obesity ([address]     OA (osteoarthritis)     Ovarian cancer ([address]     cervical and uterine    Rheumatoid arteritis     S/P left pulmonary artery pressure sensor implant placement 8/31/2017    Cardiomems     Tobacco use disorder 3/21/2012    Uterine cervix cancer ([address]     Vitamin D deficiency 8/9/2013     Past Surgical History:   Procedure Laterality Date    CARDIAC SURG PROCEDURE UNLIST      stents    COLONOSCOPY N/A 6/24/2016    COLONOSCOPY performed by Jose Alfredo Rosario MD at [facility] HX APPENDECTOMY      HX CARPAL TUNNEL RELEASE      bilateral    HX CHOLECYSTECTOMY      HX HERNIA REPAIR      HX HYSTERECTOMY      HX ORTHOPAEDIC  11/12/12    right knee replacement    HX TONSIL AND ADENOIDECTOMY      UPPER GI ENDOSCOPY,DILATN W GUIDE  6/24/2016            Prior Level of Function/Environment/Context: Pt lives with her , previously I with ADLs apart from occasional assistance for LB dressing, ambulates short distances at home with or without cane/walker.     Home Situation  Home Environment: Private residence  # Steps to Enter: 3  Rails to Enter: No  One/Two Story Residence: One story  Living Alone: No (lives with  and grandson)  Support Systems: Family member(s), Spouse/Significant Other/Partner  Patient Expects to be Discharged to[de-identified] Private residence  Current DME Used/Available at Home: Elgin Ma, straight, Walker, rolling, Walker, rollator, Shower chair, Commode, bedside  Tub or Shower Type: Shower    Hand dominance: Right    EXAMINATION OF PERFORMANCE DEFICITS:  Cognitive/Behavioral Status:  Neurologic State: Alert  Orientation Level: Oriented X4  Cognition: Appropriate decision making; Follows commands  Perception: Appears intact  Perseveration: No perseveration noted  Safety/Judgement: Awareness of environment; Fall prevention;Home safety      Hearing: Auditory  Auditory Impairment: None    Vision/Perceptual:          Acuity: Able to read clock/calendar on wall without difficulty    Corrective Lenses: Reading glasses    Range of Motion:  AROM: Generally decreased, functional  PROM: Within functional limits        Strength:  Strength: Generally decreased, functional        Coordination:  Coordination: Within functional limits  Fine Motor Skills-Upper: Left Intact; Right Intact    Gross Motor Skills-Upper: Left Intact; Right Intact    Tone & Sensation:  Tone: Normal  Sensation: Impaired        Balance:  Sitting: Intact  Standing: Impaired  Standing - Static: Good  Standing - Dynamic : Good    Functional Mobility and Transfers for ADLs:  Bed Mobility:  Rolling: Independent  Supine to Sit: Independent  Sit to Supine: Modified independent  Scooting: Modified independent    Transfers:  Sit to Stand: Stand-by assistance  Stand to Sit: Stand-by assistance  Toilet Transfer : Stand-by assistance    ADL Assessment:  Feeding: Independent    Oral Facial Hygiene/Grooming: Independent (sitting)    Bathing: Minimum assistance; Adaptive equipment; Additional time    Upper Body Dressing: Setup    Lower Body Dressing: Moderate assistance; Adaptive equipment; Additional time    Toileting: Stand by assistance       ADL Intervention and task modifications:       Grooming  Washing Hands: Stand-by assistance (standing at sink)       Lower Body [facility] on Shoes Without Back: Supervision/set-up    Toileting  Bladder Hygiene: Stand-by assistance    Cognitive Retraining  Safety/Judgement: Awareness of environment; Fall prevention;Home safety    Functional Measure:  Barthel Index:    Bathin  Bladder: 5  Bowels: 10  Groomin  Dressin  Feeding: 10  Mobility: 0  Stairs: 0  Toilet Use: 5  Transfer (Bed to Chair and Back): 10  Total: 50       Barthel and G-code impairment scale:  Percentage of impairment CH  0% CI  1-19% CJ  20-39% CK  40-59% CL  60-79% CM  80-99% CN  100%   Barthel Score 0-100 100 99-80 79-60 59-40 20-39 1-19   0   Barthel Score 0-20 20 17-19 13-16 9-12 5-8 1-4 0      The Barthel ADL Index: Guidelines  1. The index should be used as a record of what a patient does, not as a record of what a patient could do. 2. The main aim is to establish degree of independence from any help, physical or verbal, however minor and for whatever reason. 3. The need for supervision renders the patient not independent. 4. A patient's performance should be established using the best available evidence. Asking the patient, friends/relatives and nurses are the usual sources, but direct observation and common sense are also important. However direct testing is not needed. 5. Usually the patient's performance over the preceding 24-48 hours is important, but occasionally longer periods will be relevant. 6. Middle categories imply that the patient supplies over 50 per cent of the effort. 7. Use of aids to be independent is allowed. Sonja Perez., Barthel, DBonitaW. ([id number]). Functional evaluation: the Barthel Index. [address] [facility] (14)2. Javon Garcia, J.J.M.JOSE RAMON, Susie Uribe., Barbara Yap.[facility], [address] [place] [address] (). Measuring the change indisability after inpatient rehabilitation; comparison of the responsiveness of the Barthel Index and Functional [place] Measure. Journal of Neurology, Neurosurgery, and Psychiatry, 66(4), 091-792. Wing Ortiz, N.J.A, JENIFFER Smith, & Lorrie Hines, M.A. (2004.) Assessment of post-stroke quality of life in cost-effectiveness studies: The usefulness of the Barthel Index and the EuroQoL-5D.  Quality of Life Research, 13, 879-00       G codes: In compliance with CMSs Claims Based Outcome Reporting, the following G-code set was chosen for this patient based on their primary functional limitation being treated: The outcome measure chosen to determine the severity of the functional limitation was the Barthel Index with a score of 50/100 which was correlated with the impairment scale. ? Self Care:     - CURRENT STATUS: CK - 40%-59% impaired, limited or restricted    - GOAL STATUS: CI - 1%-19% impaired, limited or restricted    - D/C STATUS:  ---------------To be determined---------------     Occupational Therapy Evaluation Charge Determination   History Examination Decision-Making   LOW Complexity : Brief history review  LOW Complexity : 1-3 performance deficits relating to physical, cognitive , or psychosocial skils that result in activity limitations and / or participation restrictions  MEDIUM Complexity : Patient may present with comorbidities that affect occupational performnce. Miniml to moderate modification of tasks or assistance (eg, physical or verbal ) with assesment(s) is necessary to enable patient to complete evaluation       Based on the above components, the patient evaluation is determined to be of the following complexity level: LOW   Pain:  Pain Scale 1: Numeric (0 - 10)  Pain Intensity 1: 3  Pain Location 1: Generalized     Pain Description 1: Aching  Pain Intervention(s) 1: Medication (see MAR)  Activity Tolerance:   Fair  Please refer to the flowsheet for vital signs taken during this treatment. After treatment:   [] Patient left in no apparent distress sitting up in chair  [x] Patient left in no apparent distress in bed  [x] Call bell left within reach  [x] Nursing notified  [] Caregiver present  [] Bed alarm activated    COMMUNICATION/EDUCATION:   The patients plan of care was discussed with: Physical Therapist and Registered Nurse.   [x] Home safety education was provided and the patient/caregiver indicated understanding. [x] Patient/family have participated as able in goal setting and plan of care. [x] Patient/family agree to work toward stated goals and plan of care. [] Patient understands intent and goals of therapy, but is neutral about his/her participation. [] Patient is unable to participate in goal setting and plan of care. This patients plan of care is appropriate for delegation to [facility].     Thank you for this referral.  Eleazar Gamino OT  Time Calculation: 24 mins

## 2018-06-06 NOTE — PROGRESS NOTES
Bedside and Verbal shift change report given to Nga RN (oncoming nurse) by Katie Lees RN (offgoing nurse).  Report included the following information SBAR, Kardex, ED Summary, Procedure Summary, MAR, Recent Results and Cardiac Rhythm SR.

## 2018-06-06 NOTE — PROGRESS NOTES
Problem: Mobility Impaired (Adult and Pediatric)  Goal: *Acute Goals and Plan of Care (Insert Text)  Physical Therapy Goals  Initiated 6/6/2018   1. Patient will ambulate with modified independence for 100 feet with the least restrictive device within 5 day(s). 5.  Patient will ascend/descend 4 stairs with 1 handrail(s) with supervision/set-up within 5 day(s). 3.. Patient will be able to articulate 3 strategies she will use to conserve energy upon return home. physical Therapy EVALUATION  Patient: Cody Kelley (68 y.o. female)  Date: 6/6/2018  Primary Diagnosis: Chest pain  Chest pain        Precautions:        ASSESSMENT :  Ms Bunny Cantu presents with limited activity tolerance and reluctance to participate. Her body habitus & OA changes knees impact her gait & balance while upright. Overall she is indep in bed mobility, and benefits from the use of an assistive device when she is upright; own walker, cane. Denies falls. She has received PT in past and reportedly has exercises which she does 'when I can.'  Home health PT may be of benefit. Patient initially refused assessment and requested she call when she needed to use restroom. Patient later found getting OOB indep, O2 removed. Findings are as follows. Will Rodriguez .Documentation for home O2:     ROOM AIR    AT REST   O2 SATS  Unable to obtain HR  Unable to obtain   ROOM AIR WITH ACTIVITY 02 SATS  90 HR  81   (2    ) LITERS OF O2 WITH ACTIVITY O2 SATS  Unable to obtain HR  Unable to obtain   (2    )LITERS OF 02 PATIENT LEFT COMFORTABLY  SITTING/SUPINE 02 SATS  100 HR  82   Before one could assess whether sats would drop further with activity when on room air (90 above), patient insisted the O2 be replaced. Almost immediately sats up to 100. Patient will benefit from skilled intervention to address the above impairments.   Patients rehabilitation potential is considered to be Fair  Factors which may influence rehabilitation potential include:   [] None noted  []         Mental ability/status  []         Medical condition  []         Home/family situation and support systems  []         Safety awareness  []         Pain tolerance/management  [x]         Other:motivation      PLAN :  Recommendations and Planned Interventions:  []           Bed Mobility Training             [x]    Neuromuscular Re-Education  []           Transfer Training                   []    Orthotic/Prosthetic Training  [x]           Gait Training                         []    Modalities  [x]           Therapeutic Exercises           [x]    Edema Management/Control  [x]           Therapeutic Activities            [x]    Patient and Family Training/Education  []           Other (comment):    Frequency/Duration: Patient will be followed by physical therapy  5 times a week to address goals. Discharge Recommendations: Home Health  Further Equipment Recommendations for Discharge: none     SUBJECTIVE:   Patient stated I'm not going to do any more today.     OBJECTIVE DATA SUMMARY:   HISTORY:    Past Medical History:   Diagnosis Date     Sleep Apnea 2/16/2011    Aortic aneurysm ([facility]     CAD (coronary Artery Disease) Native Artery 2/16/2011    CKD (chronic kidney disease) stage 4, GFR 15-29 ml/min ([facility] 2/10/2017    COPD (chronic obstructive pulmonary disease) (HCC)     Diabetic gastroparesis (HCC)     Diastolic heart failure ([address] 10/5/2012    DM type 2 causing neurological disease ([address]     DM type 2 causing renal disease ([address]     DM type 2 causing vascular disease ([address]     Esophageal stricture 2012    dialted Dr. Prabha Barbosa G6PD deficiency ([facility]     trait    GERD (gastroesophageal reflux disease)     Gout     ILD (interstitial lung disease) ([address]     open lung bx CJW 2010    Morbid obesity ([address]     OA (osteoarthritis)     Ovarian cancer ([address]     cervical and uterine    Rheumatoid arteritis     S/P left pulmonary artery pressure sensor implant placement 8/31/2017 [facility]     Tobacco use disorder 3/21/2012    Uterine cervix cancer (HCC)     Vitamin D deficiency 8/9/2013     Past Surgical History:   Procedure Laterality Date    CARDIAC SURG PROCEDURE UNLIST      stents    COLONOSCOPY N/A 6/24/2016    COLON[facility] by [facility]lon Trevino [facility] at [facility] HX APPENDECTOMY      HX CARPAL TUNNEL RELEASE      bilateral    HX CHOLECYSTECTOMY      HX HERNIA REPAIR      HX HYSTERECTOMY      HX ORTHOPAEDIC  11/12/12    right knee replacement    HX TONSIL AND ADENOIDECTOMY      UPPER GI ENDOSCOPY,VINOD COLON GUIDE  6/24/2016          Prior Level of Function/Home Situation: see below  Personal factors and/or comorbidities impacting plan of care: extensive med hx; OA both knees with prior TKRx2 R; DVTs    Home Situation  Home Environment: Private residence  # Steps to Enter: 3  Rails to Enter: No  One/Two Story Residence: One story  Living Alone: No (lives with  and grandson)  Support Systems: Family member(s), Spouse/Significant Other/Partner  Patient Expects to be Discharged to[de-identified] Private residence  Current DME Used/Available at Home: Paola Bicker, straight, Walker, rolling, Elvieene [place], rollator, Shower chair, Commode, bedside  Tub or Shower Type: Shower    EXAMINATION/PRESENTATION/DECISION MAKING:   Critical Behavior:  Neurologic State: Alert  Orientation Level: Oriented X4  Cognition: Appropriate decision making, Follows commands     Hearing:   Auditory  Auditory Impairment: None  Skin:  No problems noted  Edema: distal LEs with hemistasis  Range Of Motion:  AROM: Generally decreased, functional                       Strength:    Strength: Generally decreased, functional                    Tone & Sensation:   Tone: Normal              Sensation: Impaired               Coordination:  Coordination: Within functional limits  Vision:   Acuity: Able to read clock/calendar on wall without difficulty  Corrective Lenses: Reading glasses  Functional Mobility:  Bed Mobility:  Rolling: Independent  Supine to Sit: Independent  Sit to Supine: Modified independent  Scooting: Modified independent  Transfers:  Sit to Stand: Stand-by assistance  Stand to Sit: Stand-by assistance                       Balance:   Sitting: Intact  Standing: Impaired  Standing - Static: Good  Standing - Dynamic : Good  Ambulation/Gait Training:  Distance (ft): 10 Feet (ft) (x2)  Assistive Device:  (none)  Ambulation - Level of Assistance: Stand-by assistance        Gait Abnormalities: Antalgic        Base of Support: Widened                      Functional Measure:  Tinetti test:    Sitting Balance: 1  Arises: 1  Attempts to Rise: 2  Immediate Standing Balance: 2  Standing Balance: 1  Nudged: 2  Eyes Closed: 1  Turn 360 Degrees - Continuous/Discontinuous: 1  Turn 360 Degrees - Steady/Unsteady: 1  Sitting Down: 2  Balance Score: 14  Indication of Gait: 1  R Step Length/Height: 1  L Step Length/Height: 1  R Foot Clearance: 1  L Foot Clearance: 1  Step Symmetry: 0  Step Continuity: 1  Path: 1  Trunk: 1  Walking Time: 0  Gait Score: 8  Total Score: 22       Tinetti Test and G-code impairment scale:  Percentage of Impairment CH    0%   CI    1-19% CJ    20-39% CK    40-59% CL    60-79% CM    80-99% CN     100%   Tinetti  Score 0-28 28 23-27 17-22 12-16 6-11 1-5 0       Tinetti Tool Score Risk of Falls  <19 = High Fall Risk  19-24 = Moderate Fall Risk  25-28 = Low Fall Risk  Tinetti ME. Performance-Oriented Assessment of Mobility Problems in Elderly Patients. [place] 68; [id number]. (Scoring Description: PT Bulletin Feb. 10, 1993)    Older adults: Merle Fairchild et al, 2009; n = 1000 [facility] elderly evaluated with ABC, SAMARIA, ADL, and IADL)  · Mean SAMARIA score for males aged 69-68 years = 26.21(3.40)  · Mean SAMARIA score for females age 69-68 years = 25.16(4.30)  · Mean SAMARIA score for males over 80 years = 23.29(6.02)  · Mean SAMARIA score for females over 80 years = 17.20(8.32)       G codes:   In compliance with CMSs Claims Based Outcome Reporting, the following G-code set was chosen for this patient based on their primary functional limitation being treated: The outcome measure chosen to determine the severity of the functional limitation was the tinetti with a score of 22/28 which was correlated with the impairment scale. ? Mobility - Walking and Moving Around:     - CURRENT STATUS: CJ - 20%-39% impaired, limited or restricted    - GOAL STATUS: CI - 1%-19% impaired, limited or restricted    - D/C STATUS:  ---------------To be determined---------------      Physical Therapy Evaluation Charge Determination   History Examination Presentation Decision-Making   HIGH Complexity :3+ comorbidities / personal factors will impact the outcome/ POC  LOW Complexity : 1-2 Standardized tests and measures addressing body structure, function, activity limitation and / or participation in recreation  LOW Complexity : Stable, uncomplicated  Other outcome measures tinetti  LOW       Based on the above components, the patient evaluation is determined to be of the following complexity level: LOW     Pain:  Pain Scale 1: Numeric (0 - 10)  Pain Intensity 1: 3  Pain Location 1: Generalized     Pain Description 1: Aching  Pain Intervention(s) 1: Medication (see MAR)  Activity Tolerance:   See A  Please refer to the flowsheet for vital signs taken during this treatment. After treatment:   []         Patient left in no apparent distress sitting up in chair  [x]         Patient left in no apparent distress in bed  [x]         Call bell left within reach  [x]         Nursing notified  []         Caregiver present  [x]         Bed alarm activated    COMMUNICATION/EDUCATION:   The patients plan of care was discussed with: Occupational Therapist and Registered Nurse. [x]         Fall prevention education was provided and the patient/caregiver indicated understanding.   []         Patient/family have participated as able in goal setting and plan of care. []         Patient/family agree to work toward stated goals and plan of care. []         Patient understands intent and goals of therapy, but is neutral about his/her participation. []         Patient is unable to participate in goal setting and plan of care.     Thank you for this referral.  Miriam Mercado, PT   Time Calculation: 17 mins

## 2018-06-06 NOTE — PROGRESS NOTES
Bedside and Verbal shift change report given to [id number] Camryn Cummins (oncoming nurse) by Elder Villagomez RN (offgoing nurse). Report included the following information SBAR, Procedure Summary, Intake/Output, MAR and Recent Results.

## 2018-06-06 NOTE — PROGRESS NOTES
Problem: Falls - Risk of  Goal: *Absence of Falls  Document Sarah Fall Risk and appropriate interventions in the flowsheet.    Outcome: Progressing Towards Goal  Fall Risk Interventions:  Mobility Interventions: Assess mobility with egress test         Medication Interventions: Assess postural VS orthostatic hypotension, Bed/chair exit alarm, Patient to call before getting OOB, Teach patient to arise slowly         History of Falls Interventions: Bed/chair exit alarm, Room close to nurse's station

## 2018-06-06 NOTE — PROGRESS NOTES
Cardiology Progress Note                             [address], [place], [address]                                 Phone [phone]; Fax [phone]        2018 9:23 AM     Admit Date:           2018  Admit Diagnosis:  Chest pain  Chest pain  :          1955   MRN:          [id number]   ASSESSMENT/RECOMMENDATION:   1)Chronic recurrent anginal chest pain, exertional and non exertional.  - CAD with 1v disease/RCA  -cont ASA/statin/BB  - C cath unable to be done d/t respiratory status. -on high dose of imdur      2) Chronic HFpEF, class III, s/p CardioMEMS for PA monitoring:   - RHC yesterday showing Moderate-severe PA HTN, post capillary. Marked elevation in biV filling pressures. Cardiomem re- calibrated   - will continue IV bumex 2 mg BID, monitor creatinine closely       3) CKD  - creatinine  slightly up, will continue aggressive diuresis   - Nephrology following, appreciate recommendations  - she does have an AV fistula      4) Chronic severe anemia, refractory to Procrit:   - hematology following   S/p BMB- results pending       5) Chronic anticoagulation for DVT   - s/p Vit K and FFP (prior to BM biopsy)   - coumadin restarted last night       6) Hypertension: 's. Will recheck BP one hour post Am medications, if no improvement in BP I will increase hydralazine to 100 mg TID  - coreg/norvasc/imdur. 7) Hypo magensium: resolved     8) Congested cough: chest Xray now  -prn nebs  -WBC WNL/afebrile                      Last 3 Recorded Weights in this Encounter    18 0335 18 0413 18 0617   Weight: 335 lb 1.6 oz (152 kg) 334 lb (151.5 kg) 332 lb 14.3 oz (151 kg)          190 -  0700  In: 1540 [P.O.:1090; I.V.:40]  Out: [address] reports congested cough- non productive. SOB unchanged. Feels swollen  No chest pain        Current Facility-Administered Medications   Medication Dose Route Frequency    magnesium oxide (MAG-OX) tablet 400 mg  400 mg Oral BID    lidocaine (XYLOCAINE) 10 mg/mL (1 %) injection 10-20 mL  10-20 mL IntraDERMal Multiple    hydrALAZINE (APRESOLINE) tablet 50 mg  50 mg Oral TID    sodium chloride (NS) flush 5-10 mL  5-10 mL IntraVENous Q8H    sodium chloride (NS) flush 5-10 mL  5-10 mL IntraVENous PRN    bumetanide (BUMEX) injection 2 mg  2 mg IntraVENous BID    sodium chloride (OCEAN) 0.65 % nasal squeeze bottle 2 Spray  2 Spray Both Nostrils Q2H PRN    HYDROmorphone (DILAUDID) injection 0.5 mg  0.5 mg IntraVENous Q4H PRN    0.9% sodium chloride infusion 250 mL  250 mL IntraVENous PRN    nitroglycerin (NITROSTAT) tablet 0.4 mg  0.4 mg SubLINGual PRN    oxyCODONE-acetaminophen (PERCOCET 7.5) 7.5-325 mg per tablet 1 Tab  1 Tab Oral Q6H PRN    Warfarin: pharmacy to dose    Other Rx Dosing/Monitoring    amLODIPine (NORVASC) tablet 10 mg  10 mg Oral DAILY    albuterol (PROVENTIL VENTOLIN) nebulizer solution 2.5 mg  2.5 mg Nebulization Q6H PRN    allopurinol (ZYLOPRIM) tablet 100 mg  100 mg Oral DAILY    aspirin delayed-release tablet 81 mg  81 mg Oral DAILY    atorvastatin (LIPITOR) tablet 80 mg  80 mg Oral QHS    calcitRIOL (ROCALTROL) capsule 0.25 mcg  0.25 mcg Oral Q M, W, F & SAT    carvedilol (COREG) tablet 25 mg  25 mg Oral BID WITH MEALS    ergocalciferol (ERGOCALCIFEROL) capsule 50,000 Units  50,000 Units Oral every Tuesday    fluticasone (FLONASE) 50 mcg/actuation nasal spray 2 Spray  2 Spray Both Nostrils DAILY PRN    hydrOXYzine HCl (ATARAX) tablet 25 mg  25 mg Oral TID PRN    isosorbide mononitrate ER (IMDUR) tablet 120 mg  120 mg Oral DAILY    pantoprazole (PROTONIX) tablet 40 mg  40 mg Oral ACB    polyethylene glycol (MIRALAX) packet 17 g  17 g Oral DAILY    senna-docusate (PERICOLACE) 8.6-50 mg per tablet 1 Tab  1 Tab Oral DAILY    sodium chloride (NS) flush 5-10 mL  5-10 mL IntraVENous Q8H    sodium chloride (NS) flush 5-10 mL  5-10 mL IntraVENous PRN    naloxone (NARCAN) injection 0.4 mg  0.4 mg IntraVENous PRN    glucose chewable tablet 16 g  4 Tab Oral PRN    dextrose (D50W) injection syrg 12.5-25 g  12.5-25 g IntraVENous PRN    glucagon (GLUCAGEN) injection 1 mg  1 mg IntraMUSCular PRN    acetaminophen (TYLENOL) tablet 650 mg  650 mg Oral Q4H PRN    diphenhydrAMINE (BENADRYL) capsule 25 mg  25 mg Oral Q4H PRN    ondansetron (ZOFRAN) injection 4 mg  4 mg IntraVENous Q4H PRN    insulin lispro (HUMALOG) injection   SubCUTAneous AC&HS      OBJECTIVE               Intake/Output Summary (Last 24 hours) at 06/06/18 0905  Last data filed at 06/06/18 0616   Gross per 24 hour   Intake              870 ml   Output             1600 ml   Net             -730 ml       Review of Systems - History obtained from the patient AS PER  HPI    Telemetry NSR    PHYSICAL EXAM        Visit Vitals    /73    Pulse 75    Temp 97.9 °F (36.6 °C)    Resp 18    Ht 5' 10\" (1.778 m)    Wt 332 lb 14.3 oz (151 kg)    SpO2 94%    BMI 47.77 kg/m2       Gen: Well-developed, obese, in no acute distress  alert and oriented x 3  HEENT:  Pink conjunctivae, Hearing grossly normal.No scleral icterus or conjunctival, moist mucous membranes  Neck: Supple, JVD difficult to appreciate  Resp: No accessory muscle use, Clear breath sounds, congested cough- rhonchi-clears with cough (non productive)  Card: Regular Rate,Rythm, No murmurs, rubs or gallop. No thrills. GI:          soft, non-tender   MSK: No cyanosis or clubbing, good capillary refill  Skin: No rashes or ulcers, no bruising. Right groin site is soft/no drainage, tender- difficult to appreciate hematoma given body habitus. Right wrist covered with CDI bandage- +radial pulse, right hand and fingers warm.    Neuro:  Cranial nerves are grossly intact, moving all four extremities, no focal deficit, follows commands appropriately  Psych:  Good insight, oriented to person, place and time, alert, Nml Affect  LE: +2 pitting edema       DATA REVIEW            Laboratory and Imaging have been reviewed by me and are notable for  No results for input(s): CPK, CKMB, TROIQ in the last 72 hours.   Recent Labs      06/06/18   0021  06/05/18   0309  06/04/18   0550   NA  138  140  140   K  4.4  4.4  4.4   CO2  24  23  22   BUN  72*  68*  70*   CREA  4.13*  3.84*  3.83*   GLU  297*  110*  122*   MG  1.9  1.2*  1.4*   WBC  5.7  7.8  8.6   HGB  8.4*  8.4*  8.6*   HCT  26.1*  26.5*  27.4*   PLT  146*  147*  164             Shan Self, NP

## 2018-06-06 NOTE — PROGRESS NOTES
Spiritual Care Assessment/Progress Note  [facility]      NAME: Kirill Clifford      MRN: [id number]  AGE: 63 y.o. SEX: female  Samaritan Affiliation: [place]   Language: English     6/6/2018     Total Time (in minutes): 5     Spiritual Assessment begun in SF 3 PRO CARE TELE 2 through conversation with:         [x]Patient        [x] Family    [] Friend(s)        Reason for Consult: Palliative Care, Initial/Spiritual Assessment     Spiritual beliefs: (Please include comment if needed)     [x] Identifies with a monserrat tradition:     [x] Supported by a monserrat community:      [] Claims no spiritual orientation:      [] Seeking spiritual identity:           [] Adheres to an individual form of spirituality:      [] Not able to assess:                     Identified resources for coping:      [x] Prayer                               [] Music                  [] Guided Imagery     [x] Family/friends                 [] Pet visits     [] Devotional reading                         [] Unknown     [] Other:                                               Interventions offered during this visit: (See comments for more details)    Patient Interventions: Affirmation of emotions/emotional suffering, Affirmation of monserrat, Catharsis/review of pertinent events in supportive environment, Coping skills reviewed/reinforced, Iconic (affirming the presence of God/Higher Power), Initial/Spiritual assessment, patient floor, Life review/legacy, Normalization of emotional/spiritual concerns, Prayer (actual), Prayer (assurance of), Samaritan beliefs/image of God discussed     Family/Friend(s):  Affirmation of emotions/emotional suffering, Affirmation of monserrat, Catharsis/review of pertinent events in supportive environment, Coping skills reviewed/reinforced, Iconic (affirming the presence of God/Higher Power), Life review/legacy, Normalization of emotional/spiritual concerns, Prayer (actual), Prayer (assurance of), Samaritan beliefs/image of God discussed     Plan of Care:     [x] Support spiritual and/or cultural needs    [] Support AMD and/or advance care planning process      [] Support grieving process   [] Coordinate Rites and/or Rituals    [] Coordination with community clergy   [] No spiritual needs identified at this time   [] Detailed Plan of Care below (See Comments)  [] Make referral to Music Therapy  [] Make referral to Pet Therapy     [] Make referral to Addiction services  [] [facility]  [] Make referral to Spiritual Care Partner  [] No future visits requested        [] Follow up visits as needed     Comments: Kacie Perez is visiting for an initial spiritual assessment with patient in room 330. Pt and  were both present at the time. Pt notes she is doing well, though not as much as she would prefer. She is coping well and reads her Bible daily. She has had phone calls from Sikh members, [phone], and has good support from family.  offered prayer with pt and  by bedside.      Luca Goel M.Div, M.S, [address] available at [phone]

## 2018-06-06 NOTE — DISCHARGE INSTRUCTIONS
Patient Discharge Instructions    Felix Ren / [id number] : 1955    Admitted 2018 Discharged: 2018     Take Home Medications          · It is important that you take the medication exactly as they are prescribed. · Keep your medication in the bottles provided by the pharmacist and keep a list of the medication names, dosages, and times to be taken in your wallet. · Do not take other medications without consulting your doctor. BRING ALL OF YOUR MEDICINES TO YOUR OFFICE VISIT with Dr Janet Karimi  Date Time Provider Meagan Mcintyre   2018 3:00 PM DO ANTHONY CollierENA AVTAR   2018 2:20 PM Bj Kerr MD [address] [facility]               Cardiac Catheterization  Discharge Instructions     Do not drive, operate any machinery, or sign any legal documents for 24 hours after your procedure. You must have someone to drive you home.  You may take a shower 24 hours after your cardiac catheterization. Be sure to get the dressing wet and then remove it; gently wash the area with warm soapy water. Pat dry and leave open to air. To help prevent infections, be sure to keep the cath site clean and dry. No lotions, creams, powders, ointments, etc. in the cath site for approximately 1 week.  Do not take a tub bath, get in a hot tub or swimming pool for approximately 5 days or until the cath site is completely healed.  No strenuous activity or heavy lifting over 10 lbs. for 7 days.  Drink plenty of fluids for 24-48 hours after your cath to flush the contrast dye from your kidneys. No alcoholic beverages for 24 hours. You may resume your previous diet (low fat, low cholesterol) after your cath.  After your cath, some bruising or discomfort is common during the healing process. Tylenol, 1-2 tablets every 6 hours as needed, is recommended if you experience any discomfort.   If you experience any signs or symptoms of infection such as fever, chills, or poorly healing incision, persistent tenderness or swelling in the groin, redness and/or warmth to the touch, numbness, significant tingling or pain at the groin site or affected extremity, rash, drainage from the cath site, or if the leg feels tight or swollen, call your physician right away.  If bleeding at the cath site occurs, take a clean gauze pad and apply direct pressure to the groin just above the puncture site. Call 911 immediately, and continue to apply direct pressure until an ambulance gets to your location.  You may return to work  2  days after your cardiac cath if no groin bleeding. Information obtained by :  I understand that if any problems occur once I am at home I am to contact my physician. I understand and acknowledge receipt of the instructions indicated above. R.N.'s Signature                                                                  Date/Time                                                                                                                                              Patient or Representative Signature                                                          Date/Time      Edgar Johnson NP     Radial Cardiac Catheterization/Angiography Discharge Instructions   It is normal to feel tired the first couple days. Take it easy and follow the physicians instructions. CHECK THE CATHETER INSERTION SITE DAILY:   Remove the wrist dressing 24 hours after the procedure. You may shower 24 hours after the procedure. Wash with soap and water and pat dry. Gentle cleaning of the site with soap and water is sufficient, cover with a dry clean dressing or bandage. Do not apply creams or powders to the area. No soaking the wrist for 3 days. Leave the puncture site open to air after 24 hours post-procedure. CALL THE PHYSICIANS:   If the site becomes red, swollen or feels warm to the touch   If there is bleeding or drainage or if there is unusual pain at the radial site. If there is any minor oozing, you may apply a band-aid and remove after 12 hours. If the bleeding continues, hold pressure with the middle finger against the puncture site and the thumb against the back of the wrist,call 911 to be transported to the hospital.   DO NOT DRIVE YOURSELF, [facility] ACTIVITY:   For the first 24 hours do not manipulate the wrist.   No lifting, pushing or pulling over 3-5 pounds with the affected wrist for 7 daysand no straining the insertion site. Do not life grocery bags or the garbage can, do not run the vacuum  or  for 7 days. Start with short walks as in the hospital and gradually increase as tolerated each day. It is recommended to walk 30 minutes 5-7 days per week. Follow your physicians instructions on activity. Avoid walking outside in extremes of heat or cold. Walk inside when it is cold and windy or hot and humid. Things to keep in mind:   No driving for at least 24 hours, or as designated by your physician. Limit the number of times you go up and down the stairs   Take rests and pace yourself with activity. Be careful and do not strain with bowel movements. MEDICATIONS:   Take all medications as prescribed   Call your physician if you have any questions   Keep an updated list of your medications with you at all times and give a list to your physician and pharmacist   SIGNS AND SYMPTOMS:   Be cautious of symptoms of angina or recurrent symptoms such as chest discomfort, unusual shortness of breath or fatigue. These could be symptoms of restenosis, a new blockage or a heart attack. If your symptoms are relieved with rest it is still recommended that you notify your physician of recurrent chest pain or discomfort.    For CHEST PAIN or symptoms of angina not relieved with rest: If the discomfort is not relieved with rest, and you have been prescribed Nitroglycerin, take as directed (taken under the tongue, one at a time 5 minutes apart for a total of 3 doses). If the discomfort is not relieved after the 3rd nitroglycerin, call 911. If you have not been prescribed Nitroglycerin and your chest discomfort is not relieved with rest, call 911. AFTER CARE:   Follow up with your physician as instructed. Follow a heart healthy diet with proper portion control, daily stress management, daily exercise, blood pressure and cholesterol control , and smoking cessation. Avoiding Triggers With Heart Failure: Care Instructions  Your Care Instructions    Triggers are anything that make your heart failure flare up. A flare-up is also called \"sudden heart failure\" or \"acute heart failure. \" When you have a flare-up, fluid builds up in your lungs, and you have problems breathing. You might need to go to the hospital. By watching for changes in your condition and avoiding triggers, you can prevent heart failure flare-ups. Follow-up care is a key part of your treatment and safety. Be sure to make and go to all appointments, and call your doctor if you are having problems. It's also a good idea to know your test results and keep a list of the medicines you take. How can you care for yourself at home? Watch for changes in your weight and condition  · Weigh yourself without clothing at the same time each day. Record your weight. Call your doctor if you have sudden weight gain, such as more than 2 to 3 pounds in a day or 5 pounds in a week. (Your doctor may suggest a different range of weight gain.) A sudden weight gain may mean that your heart failure is getting worse. · Keep a daily record of your symptoms. Write down any changes in how you feel, such as new shortness of breath, cough, or problems eating.  Also record if your ankles are more swollen than usual and if you feel more tired than usual. Note anything that you ate or did that could have triggered these changes. Limit sodium  Sodium causes your body to hold on to extra water. This may cause your heart failure symptoms to get worse. People get most of their sodium from processed foods. Fast food and restaurant meals also tend to be very high in sodium. · Your doctor may suggest that you limit sodium to 2,000 milligrams (mg) a day or less. That is less than 1 teaspoon of salt a day, including all the salt you eat in cooking or in packaged foods. · Read food labels on cans and food packages. They tell you how much sodium you get in one serving. Check the serving size. If you eat more than one serving, you are getting more sodium. · Be aware that sodium can come in forms other than salt, including monosodium glutamate (MSG), sodium citrate, and sodium bicarbonate (baking soda). MSG is often added to Asian food. You can sometimes ask for food without MSG or salt. · Slowly reducing salt will help you adjust to the taste. Take the salt shaker off the table. · Flavor your food with garlic, lemon juice, onion, vinegar, herbs, and spices instead of salt. Do not use soy sauce, steak sauce, onion salt, garlic salt, mustard, or ketchup on your food, unless it is labeled \"low-sodium\" or \"low-salt. \"  · Make your own salad dressings, sauces, and ketchup without adding salt. · Use fresh or frozen ingredients, instead of canned ones, whenever you can. Choose low-sodium canned goods. · Eat less processed food and food from restaurants, including fast food. Exercise as directed  Moderate, regular exercise is very good for your heart. It improves your blood flow and helps control your weight. But too much exercise can stress your heart and cause a heart failure flare-up. · Check with your doctor before you start an exercise program.  · Walking is an easy way to get exercise. Start out slowly.  Gradually increase the length and pace of your walk. Swimming, riding a bike, and using a treadmill are also good forms of exercise. · When you exercise, watch for signs that your heart is working too hard. You are pushing yourself too hard if you cannot talk while you are exercising. If you become short of breath or dizzy or have chest pain, stop, sit down, and rest.  · Do not exercise when you do not feel well. Take medicines correctly  · Take your medicines exactly as prescribed. Call your doctor if you think you are having a problem with your medicine. · Make a list of all the medicines you take. Include those prescribed to you by other doctors and any over-the-counter medicines, vitamins, or supplements you take. Take this list with you when you go to any doctor. · Take your medicines at the same time every day. It may help you to post a list of all the medicines you take every day and what time of day you take them. · Make taking your medicine as simple as you can. Plan times to take your medicines when you are doing other things, such as eating a meal or getting ready for bed. This will make it easier to remember to take your medicines. · Get organized. Use helpful tools, such as daily or weekly pill containers. When should you call for help? Call 911 if you have symptoms of sudden heart failure such as:  ? · You have severe trouble breathing. ? · You cough up pink, foamy mucus. ? · You have a new irregular or rapid heartbeat. ?Call your doctor now or seek immediate medical care if:  ? · You have new or increased shortness of breath. ? · You are dizzy or lightheaded, or you feel like you may faint. ? · You have sudden weight gain, such as more than 2 to 3 pounds in a day or 5 pounds in a week. (Your doctor may suggest a different range of weight gain.)   ? · You have increased swelling in your legs, ankles, or feet. ? · You are suddenly so tired or weak that you cannot do your usual activities. ? Watch closely for changes in your health, and be sure to contact your doctor if you develop new symptoms. Where can you learn more? Go to http://lindsay-kai.info/. Enter N599 in the search box to learn more about \"Avoiding Triggers With Heart Failure: Care Instructions. \"  Current as of: September 21, 2016  Content Version: 11.4  © 4124-0533 [employer]. Care instructions adapted under license by [employer] (which disclaims liability or warranty for this information). If you have questions about a medical condition or this instruction, always ask your healthcare professional. [address] any warranty or liability for your use of this information.

## 2018-06-06 NOTE — PROGRESS NOTES
[facility] Dosing Services: 6/6/18    Consult for Warfarin Dosing by Pharmacy by JUANCHO Carlson NP. Consult provided for this 63 y.o.  female , for indication of Venous Thrombosis. Day of Therapy: Continued from home. 2 mg on Mon, Fri and 3 mg (1.5 tabs) on Sun, Tue, Wed, Thurs, Sat   Dose to achieve an INR goal of 2-3     Order entered for  Warfarin  3 (mg) ordered to be given today at 18:00. Significant drug interactions: Vit K 5 mg 5/31/18  Previous dose given 3mg    PT/INR Lab Results   Component Value Date/Time    INR 1.3 (H) 06/06/2018 12:21 AM      Platelets Lab Results   Component Value Date/Time    PLATELET 789 (L) 58/58/8283 12:21 AM      H/H Lab Results   Component Value Date/Time    HGB 8.4 (L) 06/06/2018 12:21 AM        Pharmacy to follow daily and will provide subsequent Warfarin dosing based on clinical status.   Gina Cruz  Contact information [phone]

## 2018-06-06 NOTE — CONSULTS
Palliative Medicine Consult  [place]: [phone] ([phone])    Patient Name: Jose M Reynoso  YOB: 1955    Date of Initial Consult: 06/06/2018  Reason for Consult: Care decisions  Requesting Provider: Amador Falcon MD   Primary Care Physician: Isaac Khan DO     SUMMARY:   Jose M Reynoso is a 63 y.o. with a past history of ILD (on 2L home oxygen), CAD, AAA, JASEN, CKD, DM, gastroparesis, diastolic heart failure, esophageal stricture, GERD, HTN, OA, obesity, cervical/uterine CA,  And DVT on coumadin, who was admitted on 5/30/2018 from home with a diagnosis of acute chest pain. Patient was sent to the ED at [facility] from Nephrologist's office after blood work revealed anemia with associated shortness of breath and chest pain. ED eval showed hgb 7.8, creat 3.58, chest xray with bilateral interstitial and airspace infiltrates unchanged. Patient was admitted and transfused 2 units of PRBCs as well as IV diuresis. Heme/Onc consulted for anemia and patient underwent bone marrow biopsy which was normal with no abnormal cells of dysplasia. No further hematology work-up at this time. Nephrology consulted for CKD. Creat on admission 3.58 and increased to 4.13 today after dye load and loop diuretics. Following for HD needs. Patient has functioning left wrist AVF. Cardiology consulted for chest pain and known history of significant RCA disease with prior PCI of the OM branches of the LCx, as well as cardiomems for dCHF/pHTN which appeared to have drifted. Troponins trended and normal. Attempted right and left heart cath yesterday (6/5). Successful in [address] to re calibrate cardiomems but unable to perform LHC to reassess coronary arteries due to inability to advance wire. Results of cardiomems revealed moderate-severe PA HTN, post capillary, and marked elevation in biV filling pressures for which patient's diuresis regimen was increased.     Repeat chest xray today (6/6) for worsening shortness of breath and cough and showed patchy bilateral airspace opacity likely reflecting  pneumonia. Current medical issues leading to Palliative Medicine involvement include: . SH:  x 43 years, three children all in the [place] area. Lives with  and grandson. Worked for the [employer] until she became disabled. Affiliates with the [employer] and is a member of [employer]. PALLIATIVE DIAGNOSES:   1. Shortness of breath  2. Physical debility  3. Goals of care  4. DNR discussion  5. ACP       PLAN:   1. Met with patient and introduced the role of Palliative Medicine. 2. Symptom Management--> Patient currently without complaint. States her breathing is improved from admission and is at baseline on 2L NC (home setting). No symptoms to manage  3. Goals of care--> Discussed current hospitalization, including results of BMB and cardiac cath, and chronic medical conditions to include ILD and kidney disease. Patient described her prior level of functioning-  independent in ADLs, shortness of breath limits the distance she can walk and therefore restricts where she can go. Introduced the possibility of a new baseline level of functioning and patient acknowledges this. Is pursuing an electric scooter so that she can be more mobile and stay as active and independent as possible. . Is hopeful that swelling will improve with diuretics but is prepared to start dialysis when necessary. 4. DNR--> Addressed what the patient would want done in the event of cardiopulmonary arrest and what resuscitation would entail. Patient confirms that she elects to be a FULL CODE. 5. ACP--> Patient does not have a completed AMD and we discussed possibly completing one during this admission. She does not want to do this today but I reviewed a blank form with her and she wants to think about it. I will follow-up with her tomorrow to complete.  I did explain that default agent for healthcare decisions would fall on legal next of kin in absence of an AMD. Denny would be her , Wang Pastor Angry,   6. Initial consult note routed to primary continuity provider  7. Communicated plan of care with: Palliative IDT       GOALS OF CARE / TREATMENT PREFERENCES:     GOALS OF CARE:  Patient/Health Care Proxy Stated Goals:  (full restorative measures)      TREATMENT PREFERENCES:   Code Status: Full Code    Advance Care Planning:  Advance Care Planning 6/7/2018   Patient's Healthcare Decision Maker is: Legal Next of Ruy 71   Primary Decision Maker Name Hollie Lewis   Primary Decision Maker Phone Number [phone]   Primary Decision Maker Relationship to Patient Spouse   Secondary Decision Maker Name -   Secondary Decision Maker Relationship to Patient -   Confirm Advance Directive None   Patient Would Like to Complete Advance Directive Yes       Medical Interventions: Full interventions   Other Instructions:   Artificially Administered Nutrition:  (not addressed)     Other:    As far as possible, the palliative care team has discussed with patient / health care proxy about goals of care / treatment preferences for patient. HISTORY:     History obtained from: patient, chart    CHIEF COMPLAINT: shortness of breath and chest pain    HPI/SUBJECTIVE:    The patient is:   [x] Verbal and participatory  [] Non-participatory due to:     Patient was sent to the ED at [address] from Nephrologist's office after blood work revealed anemia with associated shortenss of breath and chest pain. ED eval showed hgb 7.8, creat 3.58, chest xray with bilateral interstitial and airspace infiltrates unchanged. Patient was admitted and transfused 2 uinits of PRBCs as well as IV diuresis. Heme/Onc consulted for anemia and patient underwent bone marrow biopsy which was normal with no abnormal cells of dysplasia. No further hematology work-up at this time. Nephrology consulted for CKD.  Creat on admission 3.58 and increased to 4.13 today after dye load and loop diuretics. Following for HD needs. Patient has functioning left wrist AVF. Cardiology consulted for chest pain and known history of significant RCA disease with prior PCI of the OM branches of the LCx, as well as cardiomems for dCHF/pHTN which appeared to have drifted. Troponins trended and normal. Attempted right and left heart cath yesterday (6/5). Succerssful in [address] to recalibrate cardiomems but unable to perform LHC to reassess coronary arterries due to inability to advance wire. Results of cardiomems revealed moderate-severe PA HTN, post capillary, and marked elevation in biV filling pressures for which patient's diuresis regimen was increased. Repeat chest xray today (6/6) for worsening shortness of breath and cough and showed patchy bilateral airspace opacity likely reflecting  pneumonia.         Clinical Pain Assessment (nonverbal scale for severity on nonverbal patients):   Clinical Pain Assessment  Severity: 0          Duration: for how long has pt been experiencing pain (e.g., 2 days, 1 month, years)  Frequency: how often pain is an issue (e.g., several times per day, once every few days, constant)     FUNCTIONAL ASSESSMENT:     Palliative Performance Scale (PPS):  PPS: 60       PSYCHOSOCIAL/SPIRITUAL SCREENING:     Palliative IDT has assessed this patient for cultural preferences / practices and a referral made as appropriate to needs (Cultural Services, Patient Advocacy, Ethics, etc.)    Advance Care Planning:  Advance Care Planning 6/7/2018   Patient's Healthcare Decision Maker is: Legal Next of Ruy 71   Primary Decision Maker Name Lillian Jaquez   Primary Decision Maker Phone Number [phone]   Primary Decision Maker Relationship to Patient Spouse   Secondary Decision Maker Name -   Secondary Decision Maker Relationship to Patient -   Confirm Advance Directive None   Patient Would Like to Complete Advance Directive Yes       Any spiritual / Baptism concerns:  [] Yes /  [x] No    Caregiver Burnout:  [] Yes /  [] No /  [x] No Caregiver Present      Anticipatory grief assessment:   [x] Normal  / [] Maladaptive       ESAS Anxiety:      ESAS Depression:          REVIEW OF SYSTEMS:     Positive and pertinent negative findings in ROS are noted above in HPI. The following systems were [x] reviewed / [] unable to be reviewed as noted in HPI  Other findings are noted below. Systems: constitutional, ears/nose/mouth/throat, respiratory, gastrointestinal, genitourinary, musculoskeletal, integumentary, neurologic, psychiatric, endocrine. Positive findings noted below. Modified ESAS Completed by: provider     Drowsiness: 0     Pain: 0           Dyspnea: 0     Constipation: No              PHYSICAL EXAM:     From RN flowsheet:  Wt Readings from Last 3 Encounters:   06/07/18 334 lb (151.5 kg)   05/15/18 335 lb 6.4 oz (152.1 kg)   04/03/18 337 lb 3.2 oz (153 kg)     Blood pressure 142/69, pulse 61, temperature 97.6 °F (36.4 °C), resp. rate 16, height 5' 10\" (1.778 m), weight 334 lb (151.5 kg), SpO2 98 %.     Pain Scale 1: Numeric (0 - 10)  Pain Intensity 1: 9  Pain Onset 1: acute  Pain Location 1: Back, Generalized  Pain Orientation 1: Posterior  Pain Description 1: Aching  Pain Intervention(s) 1: Medication (see MAR), Repositioned  Last bowel movement, if known:     Constitutional: NAD, sitting up in bed watching a movie  Eyes: pupils equal, anicteric  ENMT: no nasal discharge, moist mucous membranes  Cardiovascular: regular rhythm, distal pulses intact, +2 pitting edema up to the mid thigh  Respiratory: breathing not labored, symmetric  Gastrointestinal: soft non-tender, +bowel sounds  Musculoskeletal: no deformity, no tenderness to palpation, left wrist AVF +thrill  Skin: warm, dry  Neurologic: following commands, moving all extremities  Psychiatric: full affect, no hallucinations  Other:       HISTORY:     Active Problems:    CAD (coronary Artery Disease) Native Artery (2/16/2011)      Overview: [place] 2004      1v CAD by cath - PCI OM with SAL      Cath 5/2004 - patent stent, no new disease      Lexiscan cardiolite 12/09- normal perfusion, EF 66%      Cath: 3/19/12: PA 55/30 mean 41, wedge 26, RA 24, EDP 35, LVG 55%, LM       normal, LAD normal, LCX - OM1 patent stent, OM2 prox 85% long, %       with L to R collaterals             JASEN on CPAP (2/16/2011)      Overview: Dr. Nancy Nieto CPAP      Pulmonary HTN (3/21/2012)      HTN (hypertension) (10/1/2012)      Morbid obesity (10/1/2012)      Esophageal reflux (10/1/2012)      Gastroparesis (10/1/2012)      Chronic diastolic heart failure ([address] (10/5/2012)      Normocytic anemia (5/26/2013)      DM (diabetes mellitus), type 2 with renal complications ([address] (0/3/2219)      CKD (chronic kidney disease) stage 4, GFR 15-29 ml/min ([facility]) (2/10/2017)      COPD, severe ([address] (6/21/2017)      ILD (interstitial lung disease) ([address] (8/20/2017)      Warfarin anticoagulation (8/20/2017)      Chest pain (11/21/2017)      Hx of deep venous thrombosis (5/30/2018)      Past Medical History:   Diagnosis Date     Sleep Apnea 2/16/2011    Aortic aneurysm ([address]     CAD (coronary Artery Disease) Native Artery 2/16/2011    CKD (chronic kidney disease) stage 4, GFR 15-29 ml/min ([address] 2/10/2017    COPD (chronic obstructive pulmonary disease) ([address]     Diabetic gastroparesis (HCC)     Diastolic heart failure ([address] 10/5/2012    DM type 2 causing neurological disease ([address]     DM type 2 causing renal disease ([address]     DM type 2 causing vascular disease ([address]     Esophageal stricture 2012    dialted Dr. Tangela Sales G6PD deficiency ([address]     trait    GERD (gastroesophageal reflux disease)     Gout     ILD (interstitial lung disease) ([address]     open lung bx CJW 2010    Morbid obesity ([address]     OA (osteoarthritis)     Ovarian cancer ([address]     cervical and uterine    Rheumatoid arteritis     S/P left pulmonary artery pressure sensor implant placement 8/31/2017    Cardiomems  Tobacco use disorder 3/21/2012    Uterine cervix cancer (HCC)     Vitamin D deficiency 8/9/2013      Past Surgical History:   Procedure Laterality Date    CARDIAC SURG PROCEDURE UNLIST      stents    COLONOSCOPY N/A 6/24/2016    [facility] HX APPENDECTOMY      HX CARPAL TUNNEL RELEASE      bilateral    HX CHOLECYSTECTOMY      HX HERNIA REPAIR      HX HYSTERECTOMY      HX ORTHOPAEDIC  11/12/12    right knee replacement    HX TONSIL AND ADENOIDECTOMY      UPPER GI ENDOSCOPY,DILATN W GUIDE  6/24/2016           Family History   Problem Relation Age of Onset    Heart Disease Mother     Heart Disease Brother       History reviewed, no pertinent family history.   Social History   Substance Use Topics    Smoking status: Former Smoker     Packs/day: 0.50     Years: 41.00     Types: Cigarettes     Quit date: 11/9/2014    Smokeless tobacco: Never Used    Alcohol use No     Allergies   Allergen Reactions    Contrast Dye [Iodine] Anaphylaxis     Tolerates when pre medicated w/ IV solumedrol and benadryl prior to procedure     Levaquin [Levofloxacin] Nausea and Vomiting    Morphine Hives and Itching      Current Facility-Administered Medications   Medication Dose Route Frequency    magnesium oxide (MAG-OX) tablet 400 mg  400 mg Oral BID    lidocaine (XYLOCAINE) 10 mg/mL (1 %) injection 10-20 mL  10-20 mL IntraDERMal Multiple    hydrALAZINE (APRESOLINE) tablet 50 mg  50 mg Oral TID    sodium chloride (NS) flush 5-10 mL  5-10 mL IntraVENous Q8H    sodium chloride (NS) flush 5-10 mL  5-10 mL IntraVENous PRN    bumetanide (BUMEX) injection 2 mg  2 mg IntraVENous BID    sodium chloride (OCEAN) 0.65 % nasal squeeze bottle 2 Spray  2 Spray Both Nostrils Q2H PRN    HYDROmorphone (DILAUDID) injection 0.5 mg  0.5 mg IntraVENous Q4H PRN    0.9% sodium chloride infusion 250 mL  250 mL IntraVENous PRN    nitroglycerin (NITROSTAT) tablet 0.4 mg  0.4 mg SubLINGual PRN    oxyCODONE-acetaminophen (PERCOCET 7.5) 7.5-325 mg per tablet 1 Tab  1 Tab Oral Q6H PRN    Warfarin: pharmacy to dose    Other Rx Dosing/Monitoring    amLODIPine (NORVASC) tablet 10 mg  10 mg Oral DAILY    albuterol (PROVENTIL VENTOLIN) nebulizer solution 2.5 mg  2.5 mg Nebulization Q6H PRN    allopurinol (ZYLOPRIM) tablet 100 mg  100 mg Oral DAILY    aspirin delayed-release tablet 81 mg  81 mg Oral DAILY    atorvastatin (LIPITOR) tablet 80 mg  80 mg Oral QHS    calcitRIOL (ROCALTROL) capsule 0.25 mcg  0.25 mcg Oral Q M, W, F & SAT    carvedilol (COREG) tablet 25 mg  25 mg Oral BID WITH MEALS    ergocalciferol (ERGOCALCIFEROL) capsule 50,000 Units  50,000 Units Oral every Tuesday    fluticasone (FLONASE) 50 mcg/actuation nasal spray 2 Spray  2 Spray Both Nostrils DAILY PRN    hydrOXYzine HCl (ATARAX) tablet 25 mg  25 mg Oral TID PRN    isosorbide mononitrate ER (IMDUR) tablet 120 mg  120 mg Oral DAILY    pantoprazole (PROTONIX) tablet 40 mg  40 mg Oral ACB    polyethylene glycol (MIRALAX) packet 17 g  17 g Oral DAILY    senna-docusate (PERICOLACE) 8.6-50 mg per tablet 1 Tab  1 Tab Oral DAILY    sodium chloride (NS) flush 5-10 mL  5-10 mL IntraVENous Q8H    sodium chloride (NS) flush 5-10 mL  5-10 mL IntraVENous PRN    naloxone (NARCAN) injection 0.4 mg  0.4 mg IntraVENous PRN    glucose chewable tablet 16 g  4 Tab Oral PRN    dextrose (D50W) injection syrg 12.5-25 g  12.5-25 g IntraVENous PRN    glucagon (GLUCAGEN) injection 1 mg  1 mg IntraMUSCular PRN    acetaminophen (TYLENOL) tablet 650 mg  650 mg Oral Q4H PRN    diphenhydrAMINE (BENADRYL) capsule 25 mg  25 mg Oral Q4H PRN    ondansetron (ZOFRAN) injection 4 mg  4 mg IntraVENous Q4H PRN    insulin lispro (HUMALOG) injection   SubCUTAneous AC&HS          LAB AND IMAGING FINDINGS:     Lab Results   Component Value Date/Time    WBC 11.2 (H) 06/07/2018 05:30 AM    HGB 8.4 (L) 06/07/2018 05:30 AM    PLATELET 685 43/29/5692 05:30 AM     Lab Results   Component Value Date/Time    Sodium 137 06/07/2018 05:30 AM    Potassium 4.5 06/07/2018 05:30 AM    Chloride 106 06/07/2018 05:30 AM    CO2 23 06/07/2018 05:30 AM    BUN 84 (H) 06/07/2018 05:30 AM    Creatinine 4.07 (H) 06/07/2018 05:30 AM    Calcium 8.9 06/07/2018 05:30 AM    Magnesium 2.0 06/07/2018 05:30 AM    Phosphorus 4.8 (H) 06/07/2018 05:30 AM      Lab Results   Component Value Date/Time    AST (SGOT) 24 11/21/2017 12:40 PM    Alk. phosphatase 94 11/21/2017 12:40 PM    Protein, total 6.8 11/21/2017 12:40 PM    Albumin 3.2 (L) 11/21/2017 12:40 PM    Globulin 3.6 11/21/2017 12:40 PM     Lab Results   Component Value Date/Time    INR 1.5 (H) 06/07/2018 05:30 AM    Prothrombin time 15.3 (H) 06/07/2018 05:30 AM    aPTT 50.6 (H) 06/05/2018 03:09 AM      Lab Results   Component Value Date/Time    Iron 55 05/31/2018 02:08 PM    TIBC 201 (L) 05/31/2018 02:08 PM    Iron % saturation 27 05/31/2018 02:08 PM    Ferritin 536 (H) 05/31/2018 02:08 PM      No results found for: PH, PCO2, PO2  No components found for: [place]   Lab Results   Component Value Date/Time    CK 43 03/03/2017 02:20 PM    CK - MB <1.0 03/03/2017 02:20 PM                Total time: 70 min  Counseling / coordination time, spent as noted above: 50 min  > 50% counseling / coordination?: yes    Prolonged service was provided for  []30 min   []75 min in face to face time in the presence of the patient, spent as noted above. Time Start:   Time End:   Note: this can only be billed with 68105 (initial) or 96288 (follow up). If multiple start / stop times, list each separately.

## 2018-06-07 ENCOUNTER — PATIENT OUTREACH (OUTPATIENT)
Dept: FAMILY MEDICINE CLINIC | Age: 63
End: 2018-06-07

## 2018-06-07 LAB
ANION GAP SERPL CALC-SCNC: 8 MMOL/L (ref 5–15)
BASOPHILS # BLD: 0 K/UL (ref 0–0.1)
BASOPHILS NFR BLD: 0 % (ref 0–1)
BUN SERPL-MCNC: 84 MG/DL (ref 6–20)
BUN/CREAT SERPL: 21 (ref 12–20)
CALCIUM SERPL-MCNC: 8.9 MG/DL (ref 8.5–10.1)
CHLORIDE SERPL-SCNC: 106 MMOL/L (ref 97–108)
CO2 SERPL-SCNC: 23 MMOL/L (ref 21–32)
CREAT SERPL-MCNC: 4.07 MG/DL (ref 0.55–1.02)
DIFFERENTIAL METHOD BLD: ABNORMAL
EOSINOPHIL # BLD: 0.1 K/UL (ref 0–0.4)
EOSINOPHIL NFR BLD: 1 % (ref 0–7)
ERYTHROCYTE [DISTWIDTH] IN BLOOD BY AUTOMATED COUNT: 16.1 % (ref 11.5–14.5)
ERYTHROCYTE [DISTWIDTH] IN BLOOD BY AUTOMATED COUNT: 16.2 % (ref 11.5–14.5)
GLUCOSE BLD STRIP.AUTO-MCNC: 145 MG/DL (ref 65–100)
GLUCOSE BLD STRIP.AUTO-MCNC: 148 MG/DL (ref 65–100)
GLUCOSE BLD STRIP.AUTO-MCNC: 150 MG/DL (ref 65–100)
GLUCOSE BLD STRIP.AUTO-MCNC: 182 MG/DL (ref 65–100)
GLUCOSE SERPL-MCNC: 147 MG/DL (ref 65–100)
HCT VFR BLD AUTO: 26.7 % (ref 35–47)
HCT VFR BLD AUTO: 26.9 % (ref 35–47)
HGB BLD-MCNC: 8.3 G/DL (ref 11.5–16)
HGB BLD-MCNC: 8.4 G/DL (ref 11.5–16)
IMM GRANULOCYTES # BLD: 0.1 K/UL (ref 0–0.04)
IMM GRANULOCYTES NFR BLD AUTO: 1 % (ref 0–0.5)
INR PPP: 1.5 (ref 0.9–1.1)
LYMPHOCYTES # BLD: 2.1 K/UL (ref 0.8–3.5)
LYMPHOCYTES NFR BLD: 19 % (ref 12–49)
MAGNESIUM SERPL-MCNC: 2 MG/DL (ref 1.6–2.4)
MCH RBC QN AUTO: 29.7 PG (ref 26–34)
MCH RBC QN AUTO: 30 PG (ref 26–34)
MCHC RBC AUTO-ENTMCNC: 30.9 G/DL (ref 30–36.5)
MCHC RBC AUTO-ENTMCNC: 31.5 G/DL (ref 30–36.5)
MCV RBC AUTO: 95.4 FL (ref 80–99)
MCV RBC AUTO: 96.4 FL (ref 80–99)
MONOCYTES # BLD: 1.2 K/UL (ref 0–1)
MONOCYTES NFR BLD: 11 % (ref 5–13)
NEUTS SEG # BLD: 7.5 K/UL (ref 1.8–8)
NEUTS SEG NFR BLD: 68 % (ref 32–75)
NRBC # BLD: 0 K/UL (ref 0–0.01)
NRBC # BLD: 0 K/UL (ref 0–0.01)
NRBC BLD-RTO: 0 PER 100 WBC
NRBC BLD-RTO: 0 PER 100 WBC
PHOSPHATE SERPL-MCNC: 4.8 MG/DL (ref 2.6–4.7)
PLATELET # BLD AUTO: 152 K/UL (ref 150–400)
PLATELET # BLD AUTO: 157 K/UL (ref 150–400)
PMV BLD AUTO: 10.4 FL (ref 8.9–12.9)
PMV BLD AUTO: 11.1 FL (ref 8.9–12.9)
POTASSIUM SERPL-SCNC: 4.5 MMOL/L (ref 3.5–5.1)
PROTHROMBIN TIME: 15.3 SEC (ref 9–11.1)
RBC # BLD AUTO: 2.79 M/UL (ref 3.8–5.2)
RBC # BLD AUTO: 2.8 M/UL (ref 3.8–5.2)
SERVICE CMNT-IMP: ABNORMAL
SODIUM SERPL-SCNC: 137 MMOL/L (ref 136–145)
WBC # BLD AUTO: 11 K/UL (ref 3.6–11)
WBC # BLD AUTO: 11.2 K/UL (ref 3.6–11)

## 2018-06-07 PROCEDURE — 74011250637 HC RX REV CODE- 250/637: Performed by: INTERNAL MEDICINE

## 2018-06-07 PROCEDURE — 94640 AIRWAY INHALATION TREATMENT: CPT

## 2018-06-07 PROCEDURE — 74011000250 HC RX REV CODE- 250: Performed by: INTERNAL MEDICINE

## 2018-06-07 PROCEDURE — 74011250636 HC RX REV CODE- 250/636: Performed by: INTERNAL MEDICINE

## 2018-06-07 PROCEDURE — 74011000250 HC RX REV CODE- 250: Performed by: NURSE PRACTITIONER

## 2018-06-07 PROCEDURE — 94660 CPAP INITIATION&MGMT: CPT

## 2018-06-07 PROCEDURE — 82962 GLUCOSE BLOOD TEST: CPT

## 2018-06-07 PROCEDURE — 65660000000 HC RM CCU STEPDOWN

## 2018-06-07 PROCEDURE — 84100 ASSAY OF PHOSPHORUS: CPT | Performed by: INTERNAL MEDICINE

## 2018-06-07 PROCEDURE — 36415 COLL VENOUS BLD VENIPUNCTURE: CPT | Performed by: NURSE PRACTITIONER

## 2018-06-07 PROCEDURE — 77010033678 HC OXYGEN DAILY

## 2018-06-07 PROCEDURE — 83735 ASSAY OF MAGNESIUM: CPT | Performed by: INTERNAL MEDICINE

## 2018-06-07 PROCEDURE — 93005 ELECTROCARDIOGRAM TRACING: CPT

## 2018-06-07 PROCEDURE — 85025 COMPLETE CBC W/AUTO DIFF WBC: CPT | Performed by: NURSE PRACTITIONER

## 2018-06-07 PROCEDURE — 74011000258 HC RX REV CODE- 258: Performed by: INTERNAL MEDICINE

## 2018-06-07 PROCEDURE — 85027 COMPLETE CBC AUTOMATED: CPT | Performed by: INTERNAL MEDICINE

## 2018-06-07 PROCEDURE — 97535 SELF CARE MNGMENT TRAINING: CPT

## 2018-06-07 PROCEDURE — 85610 PROTHROMBIN TIME: CPT | Performed by: NURSE PRACTITIONER

## 2018-06-07 PROCEDURE — 74011250637 HC RX REV CODE- 250/637: Performed by: NURSE PRACTITIONER

## 2018-06-07 PROCEDURE — 80048 BASIC METABOLIC PNL TOTAL CA: CPT | Performed by: INTERNAL MEDICINE

## 2018-06-07 RX ORDER — WARFARIN 2 MG/1
4 TABLET ORAL ONCE
Status: COMPLETED | OUTPATIENT
Start: 2018-06-07 | End: 2018-06-07

## 2018-06-07 RX ORDER — HYDRALAZINE HYDROCHLORIDE 25 MG/1
75 TABLET, FILM COATED ORAL 3 TIMES DAILY
Status: DISCONTINUED | OUTPATIENT
Start: 2018-06-07 | End: 2018-06-08

## 2018-06-07 RX ORDER — ONDANSETRON 4 MG/1
8 TABLET, ORALLY DISINTEGRATING ORAL
Status: DISCONTINUED | OUTPATIENT
Start: 2018-06-07 | End: 2018-06-15 | Stop reason: HOSPADM

## 2018-06-07 RX ORDER — METOLAZONE 5 MG/1
5 TABLET ORAL DAILY
Status: DISCONTINUED | OUTPATIENT
Start: 2018-06-07 | End: 2018-06-15 | Stop reason: HOSPADM

## 2018-06-07 RX ADMIN — FLUTICASONE PROPIONATE 2 SPRAY: 50 SPRAY, METERED NASAL at 20:13

## 2018-06-07 RX ADMIN — Medication 400 MG: at 17:26

## 2018-06-07 RX ADMIN — Medication 400 MG: at 11:11

## 2018-06-07 RX ADMIN — Medication 10 ML: at 13:42

## 2018-06-07 RX ADMIN — AMLODIPINE BESYLATE 10 MG: 5 TABLET ORAL at 11:11

## 2018-06-07 RX ADMIN — WARFARIN SODIUM 4 MG: 2 TABLET ORAL at 17:27

## 2018-06-07 RX ADMIN — STANDARDIZED SENNA CONCENTRATE AND DOCUSATE SODIUM 1 TABLET: 8.6; 5 TABLET, FILM COATED ORAL at 11:09

## 2018-06-07 RX ADMIN — NITROGLYCERIN 0.4 MG: 0.4 TABLET SUBLINGUAL at 20:08

## 2018-06-07 RX ADMIN — ASPIRIN 81 MG: 81 TABLET, COATED ORAL at 11:11

## 2018-06-07 RX ADMIN — ERYTHROPOIETIN 10000 UNITS: 10000 INJECTION, SOLUTION INTRAVENOUS; SUBCUTANEOUS at 17:30

## 2018-06-07 RX ADMIN — ALBUTEROL SULFATE 2.5 MG: 2.5 SOLUTION RESPIRATORY (INHALATION) at 12:05

## 2018-06-07 RX ADMIN — ATORVASTATIN CALCIUM 80 MG: 20 TABLET, FILM COATED ORAL at 22:07

## 2018-06-07 RX ADMIN — Medication 10 ML: at 05:41

## 2018-06-07 RX ADMIN — NITROGLYCERIN 0.4 MG: 0.4 TABLET SUBLINGUAL at 20:13

## 2018-06-07 RX ADMIN — HYDROMORPHONE HYDROCHLORIDE 0.5 MG: 2 INJECTION INTRAMUSCULAR; INTRAVENOUS; SUBCUTANEOUS at 01:38

## 2018-06-07 RX ADMIN — BUMETANIDE 2 MG: 0.25 INJECTION INTRAMUSCULAR; INTRAVENOUS at 11:10

## 2018-06-07 RX ADMIN — AZITHROMYCIN MONOHYDRATE 500 MG: 500 INJECTION, POWDER, LYOPHILIZED, FOR SOLUTION INTRAVENOUS at 23:03

## 2018-06-07 RX ADMIN — Medication 10 ML: at 22:00

## 2018-06-07 RX ADMIN — HYDROMORPHONE HYDROCHLORIDE 0.5 MG: 2 INJECTION INTRAMUSCULAR; INTRAVENOUS; SUBCUTANEOUS at 05:41

## 2018-06-07 RX ADMIN — METOLAZONE 5 MG: 5 TABLET ORAL at 11:10

## 2018-06-07 RX ADMIN — ALBUTEROL SULFATE 2.5 MG: 2.5 SOLUTION RESPIRATORY (INHALATION) at 20:27

## 2018-06-07 RX ADMIN — HYDRALAZINE HYDROCHLORIDE 75 MG: 25 TABLET, FILM COATED ORAL at 17:27

## 2018-06-07 RX ADMIN — POLYETHYLENE GLYCOL (3350) 17 G: 17 POWDER, FOR SOLUTION ORAL at 11:09

## 2018-06-07 RX ADMIN — CEFTRIAXONE SODIUM 1 G: 1 INJECTION, POWDER, FOR SOLUTION INTRAMUSCULAR; INTRAVENOUS at 22:10

## 2018-06-07 RX ADMIN — CARVEDILOL 25 MG: 12.5 TABLET, FILM COATED ORAL at 11:11

## 2018-06-07 RX ADMIN — HYDRALAZINE HYDROCHLORIDE 75 MG: 25 TABLET, FILM COATED ORAL at 22:08

## 2018-06-07 RX ADMIN — ALLOPURINOL 100 MG: 100 TABLET ORAL at 11:10

## 2018-06-07 RX ADMIN — OXYCODONE HYDROCHLORIDE AND ACETAMINOPHEN 1 TABLET: 7.5; 325 TABLET ORAL at 00:00

## 2018-06-07 RX ADMIN — BUMETANIDE 2 MG: 0.25 INJECTION INTRAMUSCULAR; INTRAVENOUS at 17:26

## 2018-06-07 RX ADMIN — ISOSORBIDE MONONITRATE 120 MG: 60 TABLET ORAL at 11:11

## 2018-06-07 RX ADMIN — ONDANSETRON 8 MG: 4 TABLET, ORALLY DISINTEGRATING ORAL at 10:17

## 2018-06-07 RX ADMIN — HYDROMORPHONE HYDROCHLORIDE 0.5 MG: 2 INJECTION INTRAMUSCULAR; INTRAVENOUS; SUBCUTANEOUS at 20:53

## 2018-06-07 RX ADMIN — CARVEDILOL 25 MG: 12.5 TABLET, FILM COATED ORAL at 17:27

## 2018-06-07 NOTE — PROGRESS NOTES
Hospital Discharge Follow-Up      Date/Time:  6/7/2018 8:49 AM    Notified patient was admitted to [facility] on 5/30/18. Nurse Navigator will monitor for discharge planning.

## 2018-06-07 NOTE — PROGRESS NOTES
Palliative Medicine Consult  [place]: [phone] ([phone]    Patient Name: Adolph Gibbs  YOB: 1955    Date of Initial Consult: 06/06/2018  Reason for Consult: Care decisions  Requesting Provider: Gema Rivero MD   Primary Care Physician: Elign Markham DO     SUMMARY:   Adolph Gibbs is a 63 y.o. with a past history of ILD (on 2L home oxygen), CAD, AAA, JASEN, CKD, DM, gastroparesis, diastolic heart failure, esophageal stricture, GERD, HTN, OA, obesity, cervical/uterine CA,  And DVT on coumadin, who was admitted on 5/30/2018 from home with a diagnosis of acute chest pain. Patient was sent to the ED at [facility] from Nephrologist's office after blood work revealed anemia with associated shortness of breath and chest pain. ED eval showed hgb 7.8, creat 3.58, chest xray with bilateral interstitial and airspace infiltrates unchanged. Patient was admitted and transfused 2 units of PRBCs as well as IV diuresis. Heme/Onc consulted for anemia and patient underwent bone marrow biopsy which was normal with no abnormal cells of dysplasia. No further hematology work-up at this time. Nephrology consulted for CKD. Creat on admission 3.58 and increased to 4.13 today after dye load and loop diuretics. Following for HD needs. Patient has functioning left wrist AVF. Cardiology consulted for chest pain and known history of significant RCA disease with prior PCI of the OM branches of the LCx, as well as cardiomems for dCHF/pHTN which appeared to have drifted. Troponins trended and normal. Attempted right and left heart cath yesterday (6/5). Successful in [address] to re calibrate cardiomems but unable to perform LHC to reassess coronary arteries due to inability to advance wire. Results of cardiomems revealed moderate-severe PA HTN, post capillary, and marked elevation in biV filling pressures for which patient's diuresis regimen was increased.     Repeat chest xray today (6/6) for worsening shortness of breath and cough and showed patchy bilateral airspace opacity likely reflecting  pneumonia. Current medical issues leading to Palliative Medicine involvement include: . SH:  x 43 years, three children all in the [place] area. Lives with  and grandson. Worked for the [employer] until she became disabled. Affiliates with the [employer] and is a member of [employer] Interim History:  6/7--> Creat stabilizing (4.07). Metolazone added today. Complaining of nausea. Breathing the same. PALLIATIVE DIAGNOSES:   1. Shortness of breath  2. Nausea  3. Physical debility  4. Goals of care  5. DNR discussion  6. ACP       PLAN:   1. Met with patient in follow-up. 2. Symptom Management--> Patient currently without complaint. States her breathing is improved from admission and is at baseline on 2L NC (home setting). Reports that she has severe nausea today. Has gotten several doses of zofran with some relief but doesn't go away completely. Discussed possibility of trying different antiemetic to achieve better control of nausea (compazine). She declined this, stating she \"doesn't want to try anything new that could make it worse\". 3. Goals of care--> (6/6) Discussed current hospitalization, including results of BMB and cardiac cath, and chronic medical conditions to include ILD and kidney disease. Patient described her prior level of functioning-  independent in ADLs, shortness of breath limits the distance she can walk and therefore restricts where she can go. Introduced the possibility of a new baseline level of functioning and patient acknowledges this. Is pursuing an electric scooter so that she can be more mobile and stay as active and independent as possible. . Is hopeful that swelling will improve with diuretics but is prepared to start dialysis when necessary. (6/7) Patient continues to be clear in goals of care -- full restorative measures in an effort recover. 4.  DNR--> Addressed what the patient would want done in the event of cardiopulmonary arrest and what resuscitation would entail. Patient confirms that she elects to be a FULL CODE. 5. ACP--> (6/6) Patient does not have a completed AMD and we discussed possibly completing one during this admission. She does not want to do this today but I reviewed a blank form with her and she wants to think about it. I will follow-up with her tomorrow to complete. I did explain that default agent for healthcare decisions would fall on legal next of kin in absence of an AMD. That would be her , Christian Lamas, (6/7) Revisited completing AMD with patient and she states that \"today is not a good day, would rather do it at another time. Maybe tomorrow\". Will attempt completion tomorrow,  6. Initial consult note routed to primary continuity provider  7. Communicated plan of care with: Palliative IDT       GOALS OF CARE / TREATMENT PREFERENCES:     GOALS OF CARE:  Patient/Health Care Proxy Stated Goals:  (full restorative measures)      TREATMENT PREFERENCES:   Code Status: Full Code    Advance Care Planning:  Advance Care Planning 6/7/2018   Patient's Healthcare Decision Maker is: Legal Next of Ruy 71   Primary Decision Maker Name Hollie Lewis   Primary Decision Maker Phone Number [phone]   Primary Decision Maker Relationship to Patient Spouse   Secondary Decision Maker Name -   Secondary Decision Maker Relationship to Patient -   Confirm Advance Directive None   Patient Would Like to Complete Advance Directive Yes       Medical Interventions: Full interventions   Other Instructions:   Artificially Administered Nutrition:  (not addressed)     Other:    As far as possible, the palliative care team has discussed with patient / health care proxy about goals of care / treatment preferences for patient.            HISTORY:     History obtained from: patient, chart    CHIEF COMPLAINT: shortness of breath and chest pain    HPI/SUBJECTIVE: The patient is:   [x] Verbal and participatory  [] Non-participatory due to:     Patient was sent to [facility] ED at [facility] from Nephrologist's office after blood work revealed anemia with associated shortness of breath and chest pain. ED eval showed hgb 7.8, creat 3.58, chest xray with bilateral interstitial and airspace infiltrates unchanged. Patient was admitted and transfused 2 units of PRBCs as well as IV diuresis. Heme/Onc consulted for anemia and patient underwent bone marrow biopsy which was normal with no abnormal cells of dysplasia. No further hematology work-up at this time. Nephrology consulted for CKD. Creat on admission 3.58 and increased to 4.13 today after dye load and loop diuretics. Following for HD needs. Patient has functioning left wrist AVF. Cardiology consulted for chest pain and known history of significant RCA disease with prior PCI of the OM branches of the LCx, as well as cardiomems for dCHF/pHTN which appeared to have drifted. Troponins trended and normal. Attempted right and left heart cath yesterday (6/5). Successful in [address] to re calibrate cardiomems but unable to perform LHC to reassess coronary arteries due to inability to advance wire. Results of cardiomems revealed moderate-severe PA HTN, post capillary, and marked elevation in biV filling pressures for which patient's diuresis regimen was increased. Repeat chest xray today (6/6) for worsening shortness of breath and cough and showed patchy bilateral airspace opacity likely reflecting  pneumonia.         Clinical Pain Assessment (nonverbal scale for severity on nonverbal patients):   Clinical Pain Assessment  Severity: 0          Duration: for how long has pt been experiencing pain (e.g., 2 days, 1 month, years)  Frequency: how often pain is an issue (e.g., several times per day, once every few days, constant)     FUNCTIONAL ASSESSMENT:     Palliative Performance Scale (PPS):  PPS: 60       PSYCHOSOCIAL/SPIRITUAL SCREENING: Palliative IDT has assessed this patient for cultural preferences / practices and a referral made as appropriate to needs (Cultural Services, Patient Advocacy, Ethics, etc.)    Advance Care Planning:  Advance Care Planning 6/7/2018   Patient's Healthcare Decision Maker is: Legal Next of Ruy 71   Primary Decision Maker Name Huyen Leavitt   Primary Decision Maker Phone Number [phone]   Primary Decision Maker Relationship to Patient Spouse   Secondary Decision Maker Name -   Secondary Decision Maker Relationship to Patient -   Confirm Advance Directive None   Patient Would Like to Complete Advance Directive Yes       Any spiritual / Roman Catholic concerns:  [] Yes /  [x] No    Caregiver Burnout:  [] Yes /  [] No /  [x] No Caregiver Present      Anticipatory grief assessment:   [x] Normal  / [] Maladaptive       ESAS Anxiety:      ESAS Depression:          REVIEW OF SYSTEMS:     Positive and pertinent negative findings in ROS are noted above in HPI. The following systems were [x] reviewed / [] unable to be reviewed as noted in HPI  Other findings are noted below. Systems: constitutional, ears/nose/mouth/throat, respiratory, gastrointestinal, genitourinary, musculoskeletal, integumentary, neurologic, psychiatric, endocrine. Positive findings noted below. Modified ESAS Completed by: provider   Fatigue: 3 Drowsiness: 0     Pain: 0           Dyspnea: 2     Constipation: No              PHYSICAL EXAM:     From RN flowsheet:  Wt Readings from Last 3 Encounters:   06/07/18 334 lb (151.5 kg)   05/15/18 335 lb 6.4 oz (152.1 kg)   04/03/18 337 lb 3.2 oz (153 kg)     Blood pressure 122/63, pulse 62, temperature 97.6 °F (36.4 °C), resp. rate 18, height 5' 10\" (1.778 m), weight 334 lb (151.5 kg), SpO2 98 %.     Pain Scale 1: Numeric (0 - 10)  Pain Intensity 1: 0  Pain Onset 1: acute  Pain Location 1: Back, Generalized  Pain Orientation 1: Posterior  Pain Description 1: Aching  Pain Intervention(s) 1: Medication (see MAR), Repositioned  Last bowel movement, if known:     Constitutional: NAD, lying in bed  Eyes: pupils equal, anicteric  ENMT: no nasal discharge, moist mucous membranes  Cardiovascular: regular rhythm, distal pulses intact, +2 pitting edema up to the mid thigh  Respiratory: breathing not labored, symmetric  Gastrointestinal: soft non-tender, +bowel sounds  Musculoskeletal: no deformity, no tenderness to palpation, left wrist AVF +thrill  Skin: warm, dry  Neurologic: following commands, moving all extremities  Psychiatric: full affect, no hallucinations  Other:       HISTORY:     Active Problems:    CAD (coronary Artery Disease) Native Artery (2/16/2011)      Overview: [place] 2004      1v CAD by cath - PCI OM with SAL      Cath 5/2004 - patent stent, no new disease      Lexiscan cardiolite 12/09- normal perfusion, EF 66%      Cath: 3/19/12: PA 55/30 mean 41, wedge 26, RA 24, EDP 35, LVG 55%, LM       normal, LAD normal, LCX - OM1 patent stent, OM2 prox 85% long, %       with L to R collaterals             JASEN on CPAP (2/16/2011)      Overview: Dr. Cruzito Little CPAP      Pulmonary HTN (3/21/2012)      HTN (hypertension) (10/1/2012)      Morbid obesity (10/1/2012)      Esophageal reflux (10/1/2012)      Gastroparesis (10/1/2012)      Chronic diastolic heart failure ([address] (10/5/2012)      Normocytic anemia (5/26/2013)      DM (diabetes mellitus), type 2 with renal complications ([address] (1/8/0942)      CKD (chronic kidney disease) stage 4, GFR 15-29 ml/min ([facility]) (2/10/2017)      COPD, severe ([address] (6/21/2017)      ILD (interstitial lung disease) ([address] (8/20/2017)      Warfarin anticoagulation (8/20/2017)      Chest pain (11/21/2017)      Hx of deep venous thrombosis (5/30/2018)      Past Medical History:   Diagnosis Date     Sleep Apnea 2/16/2011    Aortic aneurysm (HCC)     CAD (coronary Artery Disease) Native Artery 2/16/2011    CKD (chronic kidney disease) stage 4, GFR 15-29 ml/min ([facility]) 2/10/2017    COPD (chronic obstructive pulmonary disease) ([address]     Diabetic gastroparesis (HCC)     Diastolic heart failure ([address] 10/5/2012    DM type 2 causing neurological disease ([address]     DM type 2 causing renal disease ([address]     DM type 2 causing vascular disease ([address]     Esophageal stricture 2012    dialted Dr. Burrows Serum G6PD deficiency [facility])     trait    GERD (gastroesophageal reflux disease)     Gout     ILD (interstitial lung disease) ([address]     open lung bx CJW 2010    Morbid obesity ([address]     OA (osteoarthritis)     Ovarian cancer ([address]     cervical and uterine    Rheumatoid arteritis     S/P left pulmonary artery pressure sensor implant placement 8/31/2017    Cardiomems     Tobacco use disorder 3/21/2012    Uterine cervix cancer ([address]     Vitamin D deficiency 8/9/2013      Past Surgical History:   Procedure Laterality Date    CARDIAC SURG PROCEDURE UNLIST      stents    COLONOSCOPY N/A 6/24/2016    [facility] HX APPENDECTOMY      HX CARPAL TUNNEL RELEASE      bilateral    HX CHOLECYSTECTOMY      HX HERNIA REPAIR      HX HYSTERECTOMY      HX ORTHOPAEDIC  11/12/12    right knee replacement    HX TONSIL AND ADENOIDECTOMY      UPPER GI ENDOSCOPY,DILATN W GUIDE  6/24/2016           Family History   Problem Relation Age of Onset    Heart Disease Mother     Heart Disease Brother       History reviewed, no pertinent family history.   Social History   Substance Use Topics    Smoking status: Former Smoker     Packs/day: 0.50     Years: 41.00     Types: Cigarettes     Quit date: 11/9/2014    Smokeless tobacco: Never Used    Alcohol use No     Allergies   Allergen Reactions    Contrast Dye [Iodine] Anaphylaxis     Tolerates when pre medicated w/ IV solumedrol and benadryl prior to procedure     Levaquin [Levofloxacin] Nausea and Vomiting    Morphine Hives and Itching      Current Facility-Administered Medications   Medication Dose Route Frequency    metOLazone (ZAROXOLYN) tablet 5 mg  5 mg Oral DAILY    epoetin charlie (EPOGEN;PROCRIT) injection 10,000 Units  10,000 Units SubCUTAneous Once per day on Thu Sat    ondansetron (ZOFRAN ODT) tablet 8 mg  8 mg Oral Q8H PRN    hydrALAZINE (APRESOLINE) tablet 75 mg  75 mg Oral TID    warfarin (COUMADIN) tablet 4 mg  4 mg Oral ONCE    magnesium oxide (MAG-OX) tablet 400 mg  400 mg Oral BID    lidocaine (XYLOCAINE) 10 mg/mL (1 %) injection 10-20 mL  10-20 mL IntraDERMal Multiple    sodium chloride (NS) flush 5-10 mL  5-10 mL IntraVENous Q8H    sodium chloride (NS) flush 5-10 mL  5-10 mL IntraVENous PRN    bumetanide (BUMEX) injection 2 mg  2 mg IntraVENous BID    sodium chloride (OCEAN) 0.65 % nasal squeeze bottle 2 Spray  2 Spray Both Nostrils Q2H PRN    HYDROmorphone (DILAUDID) injection 0.5 mg  0.5 mg IntraVENous Q4H PRN    0.9% sodium chloride infusion 250 mL  250 mL IntraVENous PRN    nitroglycerin (NITROSTAT) tablet 0.4 mg  0.4 mg SubLINGual PRN    oxyCODONE-acetaminophen (PERCOCET 7.5) 7.5-325 mg per tablet 1 Tab  1 Tab Oral Q6H PRN    Warfarin: pharmacy to dose    Other Rx Dosing/Monitoring    amLODIPine (NORVASC) tablet 10 mg  10 mg Oral DAILY    albuterol (PROVENTIL VENTOLIN) nebulizer solution 2.5 mg  2.5 mg Nebulization Q6H PRN    allopurinol (ZYLOPRIM) tablet 100 mg  100 mg Oral DAILY    aspirin delayed-release tablet 81 mg  81 mg Oral DAILY    atorvastatin (LIPITOR) tablet 80 mg  80 mg Oral QHS    calcitRIOL (ROCALTROL) capsule 0.25 mcg  0.25 mcg Oral Q M, W, F & SAT    carvedilol (COREG) tablet 25 mg  25 mg Oral BID WITH MEALS    ergocalciferol (ERGOCALCIFEROL) capsule 50,000 Units  50,000 Units Oral every Tuesday    fluticasone (FLONASE) 50 mcg/actuation nasal spray 2 Spray  2 Spray Both Nostrils DAILY PRN    hydrOXYzine HCl (ATARAX) tablet 25 mg  25 mg Oral TID PRN    isosorbide mononitrate ER (IMDUR) tablet 120 mg  120 mg Oral DAILY    pantoprazole (PROTONIX) tablet 40 mg  40 mg Oral ACB    polyethylene glycol (MIRALAX) packet 17 g  17 g Oral DAILY    senna-docusate (PERICOLACE) 8.6-50 mg per tablet 1 Tab  1 Tab Oral DAILY    sodium chloride (NS) flush 5-10 mL  5-10 mL IntraVENous Q8H    sodium chloride (NS) flush 5-10 mL  5-10 mL IntraVENous PRN    naloxone (NARCAN) injection 0.4 mg  0.4 mg IntraVENous PRN    glucose chewable tablet 16 g  4 Tab Oral PRN    dextrose (D50W) injection syrg 12.5-25 g  12.5-25 g IntraVENous PRN    glucagon (GLUCAGEN) injection 1 mg  1 mg IntraMUSCular PRN    acetaminophen (TYLENOL) tablet 650 mg  650 mg Oral Q4H PRN    diphenhydrAMINE (BENADRYL) capsule 25 mg  25 mg Oral Q4H PRN    insulin lispro (HUMALOG) injection   SubCUTAneous AC&HS          LAB AND IMAGING FINDINGS:     Lab Results   Component Value Date/Time    WBC 11.2 (H) 06/07/2018 05:30 AM    WBC 11.0 06/07/2018 05:30 AM    HGB 8.4 (L) 06/07/2018 05:30 AM    HGB 8.3 (L) 06/07/2018 05:30 AM    PLATELET 916 46/06/4140 05:30 AM    PLATELET 763 53/15/3776 05:30 AM     Lab Results   Component Value Date/Time    Sodium 137 06/07/2018 05:30 AM    Potassium 4.5 06/07/2018 05:30 AM    Chloride 106 06/07/2018 05:30 AM    CO2 23 06/07/2018 05:30 AM    BUN 84 (H) 06/07/2018 05:30 AM    Creatinine 4.07 (H) 06/07/2018 05:30 AM    Calcium 8.9 06/07/2018 05:30 AM    Magnesium 2.0 06/07/2018 05:30 AM    Phosphorus 4.8 (H) 06/07/2018 05:30 AM      Lab Results   Component Value Date/Time    AST (SGOT) 24 11/21/2017 12:40 PM    Alk.  phosphatase 94 11/21/2017 12:40 PM    Protein, total 6.8 11/21/2017 12:40 PM    Albumin 3.2 (L) 11/21/2017 12:40 PM    Globulin 3.6 11/21/2017 12:40 PM     Lab Results   Component Value Date/Time    INR 1.5 (H) 06/07/2018 05:30 AM    Prothrombin time 15.3 (H) 06/07/2018 05:30 AM    aPTT 50.6 (H) 06/05/2018 03:09 AM      Lab Results   Component Value Date/Time    Iron 55 05/31/2018 02:08 PM    TIBC 201 (L) 05/31/2018 02:08 PM    Iron % saturation 27 05/31/2018 02:08 PM    Ferritin 536 (H) 05/31/2018 02:08 PM      No results found for: PH, PCO2, PO2  No components found for: [place]   Lab Results   Component Value Date/Time    CK 43 03/03/2017 02:20 PM    CK - MB <1.0 03/03/2017 02:20 PM                Total time: 35 min  Counseling / coordination time, spent as noted above: 30 min  > 50% counseling / coordination?: yes    Prolonged service was provided for  []30 min   []75 min in face to face time in the presence of the patient, spent as noted above. Time Start:   Time End:   Note: this can only be billed with 72641 (initial) or 53175 (follow up). If multiple start / stop times, list each separately.

## 2018-06-07 NOTE — NURSE NAVIGATOR
Attempt made 3 times this morning to obtain Cardiomems reading. Patient was unwilling to do a reading the first 2 times I visited her due to nausea. Discussed with primary nurse who was in process of medicating her for nausea. The third visit she allowed me to place the wand behind her back but would not lie back fully on it and cooperate to try to get a reading. I can try again later today if she is willing and able. She will contact her primary nurse to reach me when she thinks she can work with me to get a reading.

## 2018-06-07 NOTE — PROGRESS NOTES
Bedside and Verbal shift change report given to [facility] (oncoming nurse) by Starr Bishop (offgoing nurse). Report included the following information SBAR, Kardex, Procedure Summary, Intake/Output, MAR and Recent Results.

## 2018-06-07 NOTE — PROGRESS NOTES
[facility] Dosing Services: 6/7/18    Consult for Warfarin Dosing by Pharmacy by JUANCHO Duarte NP. Consult provided for this 63 y.o.  female , for indication of Venous Thrombosis. Day of Therapy: Continued from home. 2 mg on Mon, Fri and 3 mg (1.5 tabs) on Sun, Tue, Wed, Thurs, Sat   Dose to achieve an INR goal of 2-3    Order entered for  Warfarin  4 (mg) ordered to be given today at 18:00. Significant drug interactions: Vit K 5 mg 5/31/18  Previous dose given 3mg   PT/INR Lab Results   Component Value Date/Time    INR 1.5 (H) 06/07/2018 05:30 AM      Platelets Lab Results   Component Value Date/Time    PLATELET 837 30/78/9392 05:30 AM    PLATELET 085 38/71/6334 05:30 AM      H/H Lab Results   Component Value Date/Time    HGB 8.4 (L) 06/07/2018 05:30 AM    HGB 8.3 (L) 06/07/2018 05:30 AM        Pharmacy to follow daily and will provide subsequent Warfarin dosing based on clinical status.   Kamilla Calles  Contact information [phone]

## 2018-06-07 NOTE — PROGRESS NOTES
Cardiology Progress Note                             [address] [facility]. [address], [place], [address] [facility]                                 Phone [phone]; Fax [phone]        2018 9:23 AM     Admit Date:           2018  Admit Diagnosis:  Chest pain  Chest pain  :          1955   MRN:          [id number]   ASSESSMENT/RECOMMENDATION:   1)Chronic recurrent anginal chest pain, exertional and non exertional.  - CAD with 1v disease/RCA  -cont ASA/statin/BB  - C cath unable to be done d/t respiratory status. -on high dose of imdur       2) Chronic HFpEF, class III, s/p CardioMEMS for PA monitoring:   - RHC yesterday showing Moderate-severe PA HTN, post capillary. Marked elevation in biV filling pressures. Cardiomem re- calibrated   - will continue IV bumex 2 mg BID, monitor creatinine closely       3) CKD  - creatinine stable, will continue aggressive diuresis, metolazone added today per nephrology   - Nephrology following, appreciate recommendations  - she does have an AV fistula      4) Chronic severe anemia, refractory to Procrit:   - hematology following   S/p BMB- results pending   H/H stable      5) Chronic anticoagulation for DVT   - s/p Vit K and FFP (prior to BM biopsy)   - coumadin restarted     6) Hypertension: -150's. Increase hydralazine 75 mg to TID  - coreg/norvasc/imdur. 7) Hypo magensium: resolved     8) Congested cough: chest xray showing patchy bilateral airspace opacity likely reflect pneumonia.  Discussing treatment with primary team.  -prn nebs  -cbc w diff added on  -WBC up to 11/afebrile                      Last 3 Recorded Weights in this Encounter    18 0413 18 0617 18 0004   Weight: 334 lb (151.5 kg) 332 lb 14.3 oz (151 kg) 334 lb (151.5 kg)          1901 -  0700  In: 990 [P.O.:970; I.V.:20]  Out: 2600 [Urine:2600]    SUBJECTIVE Broderick Parcel reports congested cough- non productive. SOB unchanged.  + nausea   No chest pain        Current Facility-Administered Medications   Medication Dose Route Frequency    metOLazone (ZAROXOLYN) tablet 5 mg  5 mg Oral DAILY    epoetin charlie (EPOGEN;PROCRIT) injection 10,000 Units  10,000 Units SubCUTAneous Once per day on Thu Sat    ondansetron (ZOFRAN ODT) tablet 8 mg  8 mg Oral Q8H PRN    magnesium oxide (MAG-OX) tablet 400 mg  400 mg Oral BID    lidocaine (XYLOCAINE) 10 mg/mL (1 %) injection 10-20 mL  10-20 mL IntraDERMal Multiple    hydrALAZINE (APRESOLINE) tablet 50 mg  50 mg Oral TID    sodium chloride (NS) flush 5-10 mL  5-10 mL IntraVENous Q8H    sodium chloride (NS) flush 5-10 mL  5-10 mL IntraVENous PRN    bumetanide (BUMEX) injection 2 mg  2 mg IntraVENous BID    sodium chloride (OCEAN) 0.65 % nasal squeeze bottle 2 Spray  2 Spray Both Nostrils Q2H PRN    HYDROmorphone (DILAUDID) injection 0.5 mg  0.5 mg IntraVENous Q4H PRN    0.9% sodium chloride infusion 250 mL  250 mL IntraVENous PRN    nitroglycerin (NITROSTAT) tablet 0.4 mg  0.4 mg SubLINGual PRN    oxyCODONE-acetaminophen (PERCOCET 7.5) 7.5-325 mg per tablet 1 Tab  1 Tab Oral Q6H PRN    Warfarin: pharmacy to dose    Other Rx Dosing/Monitoring    amLODIPine (NORVASC) tablet 10 mg  10 mg Oral DAILY    albuterol (PROVENTIL VENTOLIN) nebulizer solution 2.5 mg  2.5 mg Nebulization Q6H PRN    allopurinol (ZYLOPRIM) tablet 100 mg  100 mg Oral DAILY    aspirin delayed-release tablet 81 mg  81 mg Oral DAILY    atorvastatin (LIPITOR) tablet 80 mg  80 mg Oral QHS    calcitRIOL (ROCALTROL) capsule 0.25 mcg  0.25 mcg Oral Q M, W, F & SAT    carvedilol (COREG) tablet 25 mg  25 mg Oral BID WITH MEALS    ergocalciferol (ERGOCALCIFEROL) capsule 50,000 Units  50,000 Units Oral every Tuesday    fluticasone (FLONASE) 50 mcg/actuation nasal spray 2 Spray  2 Spray Both Nostrils DAILY PRN    hydrOXYzine HCl (ATARAX) tablet 25 mg  25 mg Oral TID PRN    isosorbide mononitrate ER (IMDUR) tablet 120 mg  120 mg Oral DAILY    pantoprazole (PROTONIX) tablet 40 mg  40 mg Oral ACB    polyethylene glycol (MIRALAX) packet 17 g  17 g Oral DAILY    senna-docusate (PERICOLACE) 8.6-50 mg per tablet 1 Tab  1 Tab Oral DAILY    sodium chloride (NS) flush 5-10 mL  5-10 mL IntraVENous Q8H    sodium chloride (NS) flush 5-10 mL  5-10 mL IntraVENous PRN    naloxone (NARCAN) injection 0.4 mg  0.4 mg IntraVENous PRN    glucose chewable tablet 16 g  4 Tab Oral PRN    dextrose (D50W) injection syrg 12.5-25 g  12.5-25 g IntraVENous PRN    glucagon (GLUCAGEN) injection 1 mg  1 mg IntraMUSCular PRN    acetaminophen (TYLENOL) tablet 650 mg  650 mg Oral Q4H PRN    diphenhydrAMINE (BENADRYL) capsule 25 mg  25 mg Oral Q4H PRN    insulin lispro (HUMALOG) injection   SubCUTAneous AC&HS      OBJECTIVE               Intake/Output Summary (Last 24 hours) at 06/07/18 1005  Last data filed at 06/06/18 1359   Gross per 24 hour   Intake              240 ml   Output             1000 ml   Net             -760 ml       Review of Systems - History obtained from the patient AS PER  HPI    Telemetry NSR    PHYSICAL EXAM        Visit Vitals    /69 (BP 1 Location: Right arm, BP Patient Position: At rest)    Pulse 61    Temp 97.6 °F (36.4 °C)    Resp 16    Ht 5' 10\" (1.778 m)    Wt 334 lb (151.5 kg)    SpO2 98%    BMI 47.92 kg/m2       Gen: Well-developed, obese, in no acute distress  alert and oriented x 3  HEENT:  Pink conjunctivae, Hearing grossly normal.No scleral icterus or conjunctival, moist mucous membranes  Neck: Supple, JVD difficult to appreciate  Resp: No accessory muscle use, diminished BS steph bases, congested cough- rhonchi-clears with cough (non productive)  Card: Regular Rate,Rythm, No murmurs, rubs or gallop. No thrills.    GI:          soft, non-tender   MSK: No cyanosis or clubbing, good capillary refill  Skin: No rashes or ulcers, no bruising.  +radial pulse, right hand and fingers warm. Neuro:  Cranial nerves are grossly intact, moving all four extremities, no focal deficit, follows commands appropriately  Psych:  Good insight, oriented to person, place and time, alert, Nml Affect  LE: +2 pitting edema       DATA REVIEW            Laboratory and Imaging have been reviewed by me and are notable for  No results for input(s): CPK, CKMB, TROIQ in the last 72 hours.   Recent Labs      06/07/18   0530  06/06/18   0021  06/05/18   0309   NA  137  138  140   K  4.5  4.4  4.4   CO2  23  24  23   BUN  84*  72*  68*   CREA  4.07*  4.13*  3.84*   GLU  147*  297*  110*   PHOS  4.8*   --    --    MG  2.0  1.9  1.2*   WBC  11.2*  5.7  7.8   HGB  8.4*  8.4*  8.4*   HCT  26.7*  26.1*  26.5*   PLT  152  146*  147*             Nohemy Tucker, NP

## 2018-06-07 NOTE — PROGRESS NOTES
[address] [facility] [place]  YOB: 1955          Assessment & Plan:   CKD 4  · Cr stabilizimg  · She has high filling pressures and needs diuresis  · No HD yet  · Cont IV Loops + metolazone today  · Daily wts: 151 : no change  · Has AVF which is ready for use    CP  · On oxygen  · Cath 6 5 18:rev    HTN   · 140-160S:Amlodipine,Corge,Loops,Hydralazine    SHPT   · Calcitriol    Anemia of CKD   · Resume 2 times a week EPO here, we will debate changing Epo to Aranesp out pt  · S/p BMB  : normal       Subjective:   CC: f/u CKD  HPI:   CKD: some nausea. Cr same. HTN not the best  Fair urine.   Anemia severe  Wt no change  ROS: no n/v/ neg for 10 sys except in HPI  Current Facility-Administered Medications   Medication Dose Route Frequency    magnesium oxide (MAG-OX) tablet 400 mg  400 mg Oral BID    lidocaine (XYLOCAINE) 10 mg/mL (1 %) injection 10-20 mL  10-20 mL IntraDERMal Multiple    hydrALAZINE (APRESOLINE) tablet 50 mg  50 mg Oral TID    sodium chloride (NS) flush 5-10 mL  5-10 mL IntraVENous Q8H    sodium chloride (NS) flush 5-10 mL  5-10 mL IntraVENous PRN    bumetanide (BUMEX) injection 2 mg  2 mg IntraVENous BID    sodium chloride (OCEAN) 0.65 % nasal squeeze bottle 2 Spray  2 Spray Both Nostrils Q2H PRN    HYDROmorphone (DILAUDID) injection 0.5 mg  0.5 mg IntraVENous Q4H PRN    0.9% sodium chloride infusion 250 mL  250 mL IntraVENous PRN    nitroglycerin (NITROSTAT) tablet 0.4 mg  0.4 mg SubLINGual PRN    oxyCODONE-acetaminophen (PERCOCET 7.5) 7.5-325 mg per tablet 1 Tab  1 Tab Oral Q6H PRN    Warfarin: pharmacy to dose    Other Rx Dosing/Monitoring    amLODIPine (NORVASC) tablet 10 mg  10 mg Oral DAILY    albuterol (PROVENTIL VENTOLIN) nebulizer solution 2.5 mg  2.5 mg Nebulization Q6H PRN    allopurinol (ZYLOPRIM) tablet 100 mg  100 mg Oral DAILY    aspirin delayed-release tablet 81 mg  81 mg Oral DAILY    atorvastatin (LIPITOR) tablet 80 mg  80 mg Oral QHS    calcitRIOL (ROCALTROL) capsule 0.25 mcg  0.25 mcg Oral Q M, W, F & SAT    carvedilol (COREG) tablet 25 mg  25 mg Oral BID WITH MEALS    ergocalciferol (ERGOCALCIFEROL) capsule 50,000 Units  50,000 Units Oral every Tuesday    fluticasone (FLONASE) 50 mcg/actuation nasal spray 2 Spray  2 Spray Both Nostrils DAILY PRN    hydrOXYzine HCl (ATARAX) tablet 25 mg  25 mg Oral TID PRN    isosorbide mononitrate ER (IMDUR) tablet 120 mg  120 mg Oral DAILY    pantoprazole (PROTONIX) tablet 40 mg  40 mg Oral ACB    polyethylene glycol (MIRALAX) packet 17 g  17 g Oral DAILY    senna-docusate (PERICOLACE) 8.6-50 mg per tablet 1 Tab  1 Tab Oral DAILY    sodium chloride (NS) flush 5-10 mL  5-10 mL IntraVENous Q8H    sodium chloride (NS) flush 5-10 mL  5-10 mL IntraVENous PRN    naloxone (NARCAN) injection 0.4 mg  0.4 mg IntraVENous PRN    glucose chewable tablet 16 g  4 Tab Oral PRN    dextrose (D50W) injection syrg 12.5-25 g  12.5-25 g IntraVENous PRN    glucagon (GLUCAGEN) injection 1 mg  1 mg IntraMUSCular PRN    acetaminophen (TYLENOL) tablet 650 mg  650 mg Oral Q4H PRN    diphenhydrAMINE (BENADRYL) capsule 25 mg  25 mg Oral Q4H PRN    ondansetron (ZOFRAN) injection 4 mg  4 mg IntraVENous Q4H PRN    insulin lispro (HUMALOG) injection   SubCUTAneous AC&HS          Objective:     Vitals:  Blood pressure 142/69, pulse 61, temperature 97.6 °F (36.4 °C), resp. rate 16, height 5' 10\" (1.778 m), weight 151.5 kg (334 lb), SpO2 98 %. Temp (24hrs), Av.8 °F (36.6 °C), Min:97.6 °F (36.4 °C), Max:98.1 °F (36.7 °C)      Intake and Output:      1901 -  0700  In: 18 [P.O.:970; I.V.:20]  Out: 2600 [Urine:2600]    Physical Exam:               GENERAL ASSESSMENT: NAD  CHEST: CTA  HEART: S1S2  ABDOMEN: Soft,NT  SKIN DRY  JT: No acute findings  EXTREMITY: trace EDEMA          Data Review      No results for input(s): TNIPOC in the last 72 hours.     No lab exists for component: ITNL   No results for input(s): CPK, CKMB, TROIQ in the last 72 hours. Recent Labs      06/07/18   0530  06/06/18   0021  06/05/18   0309   NA  137  138  140   K  4.5  4.4  4.4   CL  106  104  106   CO2  23  24  23   BUN  84*  72*  68*   CREA  4.07*  4.13*  3.84*   GLU  147*  297*  110*   PHOS  4.8*   --    --    MG  2.0  1.9  1.2*   CA  8.9  9.2  9.0   WBC  11.2*  5.7  7.8   HGB  8.4*  8.4*  8.4*   HCT  26.7*  26.1*  26.5*   PLT  152  146*  147*      Recent Labs      06/07/18   0530  06/06/18   0021  06/05/18   0309  06/04/18   1841  06/04/18   1539   INR  1.5*  1.3*  1.4*   --    --    PTP  15.3*  13.5*  13.6*   --    --    APTT   --    --   50.6*  41.2*  >130.0*     Needs: urine analysis, urine sodium, protein and creatinine  Lab Results   Component Value Date/Time    Sodium urine, random 25 11/10/2014 12:40 PM    Creatinine, urine 145.29 03/04/2017 05:01 AM         : Aura Wu MD  6/7/2018        [facility]:  [email or web address]  Lilianademetrice Jeong office:  [address] [place] [address] [address]  [place], [address] [facility]  Phone: [phone]  Fax :     [phone]    [place] office:  [address] [facility], [address]   Phone - [phone]  Fax - [phone]

## 2018-06-07 NOTE — DIABETES MGMT
DTC Progress Note    Recommendations/ Comments: If appropriate, please consider resuming basal insulin. Would consider starting with Lantus 22 units daily (0.15 units insulin/kg body wt) given pt current renal status. Pt has required 16 units of correction over the past 24 hours     Current hospital DM medication:   -Humalog insulin resistant correction      Chart reviewed on Linda Schmidt for hyperglycemia. Patient is a 63 y.o. female with known history of Type 2 Diabetes on Novolog Mix 70/30 - tid for BG >140mg/dL at home. A1c:   Lab Results   Component Value Date/Time    Hemoglobin A1c 5.2 05/30/2018 03:30 PM    Hemoglobin A1c 5.8 11/22/2017 04:55 AM       Recent Glucose Results:   Lab Results   Component Value Date/Time     (H) 06/07/2018 05:30 AM    GLUCPOC 145 (H) 06/07/2018 07:18 AM    GLUCPOC 203 (H) 06/06/2018 08:59 PM    GLUCPOC 199 (H) 06/06/2018 04:02 PM        Lab Results   Component Value Date/Time    Creatinine 4.07 (H) 06/07/2018 05:30 AM     Estimated Creatinine Clearance: 23.6 mL/min (based on Cr of 4.07). Active Orders   Diet    DIET DIABETIC CONSISTENT CARB Regular        PO intake:   Patient Vitals for the past 72 hrs:   % Diet Eaten   06/06/18 1255 100 %   06/06/18 0918 100 %   06/05/18 1856 100 %       Will continue to follow as needed.     Thank you    Darrian Aleman RD, [address]

## 2018-06-07 NOTE — PROGRESS NOTES
Problem: Falls - Risk of  Goal: *Absence of Falls  Document Sarah Fall Risk and appropriate interventions in the flowsheet.    Outcome: Progressing Towards Goal  Fall Risk Interventions:  Mobility Interventions: Patient to call before getting OOB         Medication Interventions: Patient to call before getting OOB, Teach patient to arise slowly         History of Falls Interventions: Door open when patient unattended

## 2018-06-07 NOTE — PROGRESS NOTES
Assumed care of pt from [facility]. Goals of care addressed. \"call, don't fall\" teaching given. 1730 Aromatherapy sachet given to pt for c/o nausea    Bedside and Verbal shift change report given to Frankey Friar (oncoming nurse) by Dara (offgoing nurse). Report included the following information SBAR, Kardex, Intake/Output, MAR, Recent Results and Cardiac Rhythm nsr.

## 2018-06-07 NOTE — PROGRESS NOTES
Ezio Craig [facility]  [address] [facility], [address] [place] [address]  [phone]      Medical Progress Note      NAME: Rissa Sykes   :  1955  MRM:  [id number]    Date/Time: 2018  1:54 PM       Assessment and Plan:     Chronic respiratory failure with hypoxia / COPD, severe / ILD (interstitial lung disease) / Pulmonary HTN - POA, mod to severe plm HTN laquita contributes to her LARA and hypoxia.  No curative treatment, just diuretics.  Likely to progress. Palliative care.  Continue Oxygen as needed      CAD (coronary Artery Disease) Native Artery / HTN (hypertension) / Chest pain / Dyspnea - Pain chronic, unlikely AMI.  Cath failed. Continue ASA, statin, BB, Imdur, as medical management. No further plans for cath or workup.      Chronic diastolic heart failure - POA, driven by ILD, pulm HTN, progression to cor pulmonale is in her future.  MEMs place to monitor pulm HTN. Continue bumex at higher dose, added metolazone, strict I's and O's, daily weights, low salt      Nausea and anorexia / Gastroparesis / Esophageal reflux - PPI. Worsening. May be due to poor GI circulation resulting from excess diuresis. Monitor. Check lactic acid. DM (diabetes mellitus), type 2 with renal, vascular and neurological complications - Diabetic diet and counseling.  SSI per protocol. Not on home meds. ESRD has likely potentiated her endogenous insulin. A1c Useless in setting of anemia.       CKD (chronic kidney disease) stage 4 - POA and a bit worse after cath.  Nephrology consulted. Adelaide Vasquez to progress to ESRD. Lani Lemus is a poor candidate for HD.   She has an AV fistula      Morbid obesity / JASEN on CPAP - continue CPAP, advise weight loss       Normocytic anemia - POA, heme/onc consulted. Adelaide Vasquez just chronic disease. s/p BMB, pathology negative. EPO per renal.      Hx of deep venous thrombosis / Warfarin anticoagulation - Coumadin was held for cath, now back. diagnosed with DVT in 2017.  Does not need bridge.      Hypomagnesemia - Replete and follow.      Gout - stable on allopurinol           Subjective:     Chief Complaint:  Severe nausea and anorexia this AM.  Dyspnea not better. ROS:  (bold if positive, if negative)      Tolerating PT  Tolerating Diet        Objective:     Last 24hrs VS reviewed since prior progress note.  Most recent are:    Visit Vitals    /69 (BP 1 Location: Right arm, BP Patient Position: At rest)    Pulse 68    Temp 98.1 °F (36.7 °C)    Resp 18    Ht 5' 10\" (1.778 m)    Wt 151.5 kg (334 lb)    SpO2 100%    BMI 47.92 kg/m2     SpO2 Readings from Last 6 Encounters:   06/07/18 100%   05/15/18 91%   04/03/18 96%   03/20/18 98%   01/24/18 98%   01/10/18 95%    O2 Flow Rate (L/min): 2 l/min     Intake/Output Summary (Last 24 hours) at 06/07/18 1354  Last data filed at 06/06/18 1359   Gross per 24 hour   Intake                0 ml   Output             1000 ml   Net            -1000 ml        Physical Exam:    Gen:  Morbid obese, in no acute distress  HEENT:  Pink conjunctivae, PERRL, hearing intact to voice, moist mucous membranes  Neck:  Supple, without masses, thyroid non-tender  Resp:  No accessory muscle use, distant breath sounds without wheezes rales or rhonchi  Card:  No murmurs, distant S1, S2 without thrills, bruits, 2+ peripheral edema  Abd:  Soft, non-tender, obese distended, normoactive bowel sounds are present, no mass  Lymph:  No cervical or inguinal adenopathy  Musc:  No cyanosis or clubbing  Skin:  No rashes or ulcers, skin turgor is good, R wrist in brace  Neuro:  Cranial nerves are grossly intact, no focal motor weakness, follows commands   Psych:  Poor insight, oriented to person, place and time, alert    Telemetry reviewed:   normal sinus rhythm  __________________________________________________________________  Medications Reviewed: (see below)  Medications:     Current Facility-Administered Medications   Medication Dose Route Frequency    metOLazone (ZAROXOLYN) tablet 5 mg  5 mg Oral DAILY    epoetin charlie (EPOGEN;PROCRIT) injection 10,000 Units  10,000 Units SubCUTAneous Once per day on Thu Sat    ondansetron (ZOFRAN ODT) tablet 8 mg  8 mg Oral Q8H PRN    hydrALAZINE (APRESOLINE) tablet 75 mg  75 mg Oral TID    warfarin (COUMADIN) tablet 4 mg  4 mg Oral ONCE    magnesium oxide (MAG-OX) tablet 400 mg  400 mg Oral BID    lidocaine (XYLOCAINE) 10 mg/mL (1 %) injection 10-20 mL  10-20 mL IntraDERMal Multiple    sodium chloride (NS) flush 5-10 mL  5-10 mL IntraVENous Q8H    sodium chloride (NS) flush 5-10 mL  5-10 mL IntraVENous PRN    bumetanide (BUMEX) injection 2 mg  2 mg IntraVENous BID    sodium chloride (OCEAN) 0.65 % nasal squeeze bottle 2 Spray  2 Spray Both Nostrils Q2H PRN    HYDROmorphone (DILAUDID) injection 0.5 mg  0.5 mg IntraVENous Q4H PRN    0.9% sodium chloride infusion 250 mL  250 mL IntraVENous PRN    nitroglycerin (NITROSTAT) tablet 0.4 mg  0.4 mg SubLINGual PRN    oxyCODONE-acetaminophen (PERCOCET 7.5) 7.5-325 mg per tablet 1 Tab  1 Tab Oral Q6H PRN    Warfarin: pharmacy to dose    Other Rx Dosing/Monitoring    amLODIPine (NORVASC) tablet 10 mg  10 mg Oral DAILY    albuterol (PROVENTIL VENTOLIN) nebulizer solution 2.5 mg  2.5 mg Nebulization Q6H PRN    allopurinol (ZYLOPRIM) tablet 100 mg  100 mg Oral DAILY    aspirin delayed-release tablet 81 mg  81 mg Oral DAILY    atorvastatin (LIPITOR) tablet 80 mg  80 mg Oral QHS    calcitRIOL (ROCALTROL) capsule 0.25 mcg  0.25 mcg Oral Q M, W, F & SAT    carvedilol (COREG) tablet 25 mg  25 mg Oral BID WITH MEALS    ergocalciferol (ERGOCALCIFEROL) capsule 50,000 Units  50,000 Units Oral every Tuesday    fluticasone (FLONASE) 50 mcg/actuation nasal spray 2 Spray  2 Spray Both Nostrils DAILY PRN    hydrOXYzine HCl (ATARAX) tablet 25 mg  25 mg Oral TID PRN    isosorbide mononitrate ER (IMDUR) tablet 120 mg  120 mg Oral DAILY    pantoprazole (PROTONIX) tablet 40 mg  40 mg Oral ACB    polyethylene glycol (MIRALAX) packet 17 g  17 g Oral DAILY    senna-docusate (PERICOLACE) 8.6-50 mg per tablet 1 Tab  1 Tab Oral DAILY    sodium chloride (NS) flush 5-10 mL  5-10 mL IntraVENous Q8H    sodium chloride (NS) flush 5-10 mL  5-10 mL IntraVENous PRN    naloxone (NARCAN) injection 0.4 mg  0.4 mg IntraVENous PRN    glucose chewable tablet 16 g  4 Tab Oral PRN    dextrose (D50W) injection syrg 12.5-25 g  12.5-25 g IntraVENous PRN    glucagon (GLUCAGEN) injection 1 mg  1 mg IntraMUSCular PRN    acetaminophen (TYLENOL) tablet 650 mg  650 mg Oral Q4H PRN    diphenhydrAMINE (BENADRYL) capsule 25 mg  25 mg Oral Q4H PRN    insulin lispro (HUMALOG) injection   SubCUTAneous AC&HS        Lab Data Reviewed: (see below)  Lab Review:     Recent Labs      06/07/18 0530 06/06/18   0021  06/05/18   0309   WBC  11.0  11.2*  5.7  7.8   HGB  8.3*  8.4*  8.4*  8.4*   HCT  26.9*  26.7*  26.1*  26.5*   PLT  157  152  146*  147*     Recent Labs      06/07/18 0530 06/06/18   0021  06/05/18   0309   NA  137  138  140   K  4.5  4.4  4.4   CL  106  104  106   CO2  23  24  23   GLU  147*  297*  110*   BUN  84*  72*  68*   CREA  4.07*  4.13*  3.84*   CA  8.9  9.2  9.0   MG  2.0  1.9  1.2*   PHOS  4.8*   --    --    INR  1.5*  1.3*  1.4*     Lab Results   Component Value Date/Time    Glucose (POC) 150 (H) 06/07/2018 11:14 AM    Glucose (POC) 145 (H) 06/07/2018 07:18 AM    Glucose (POC) 203 (H) 06/06/2018 08:59 PM    Glucose (POC) 199 (H) 06/06/2018 04:02 PM    Glucose (POC) 289 (H) 06/06/2018 10:47 AM     No results for input(s): PH, PCO2, PO2, HCO3, FIO2 in the last 72 hours. Recent Labs      06/07/18   0530  06/06/18   0021  06/05/18   0309   INR  1.5*  1.3*  1.4*     All Micro Results     None          I have reviewed notes of prior 24hr.     Other pertinent lab: none    Total time spent with patient: [address] discussed with: Patient, Family, Care Manager, Nursing Staff, Consultant/Specialist and >50% of time spent in counseling and coordination of care    Discussed:  Care Plan and D/C Planning    Prophylaxis:  H2B/PPI    Disposition:  SNF/LTC           ___________________________________________________    Attending Physician: Di Hahn MD

## 2018-06-07 NOTE — PROGRESS NOTES
Attempted to work with the patient for Physical Therapy, chart reviewed and discussed with nurse patient still feeling nauseous and asked to skip therapy today feel so exhausted. Notified nurse who agreed to monitor patient. We will continue to follow up with the patient for therapy thank you.

## 2018-06-07 NOTE — PROGRESS NOTES
Problem: Self Care Deficits Care Plan (Adult)  Goal: *Acute Goals and Plan of Care (Insert Text)  Occupational Therapy Goals  Initiated 6/6/2018  1. Patient will perform grooming with supervision/set-up standing at sink >3 minutes VSS within 7 day(s). 2.  Patient will perform lower body dressing with minimal assistance/contact guard assist using AE as needed within 7 day(s). 3.  Patient will perform toilet transfers with supervision/set-up within 7 day(s). 4.  Patient will perform all aspects of toileting with supervision/set-up within 7 day(s). 5.  Patient will participate in upper extremity therapeutic exercise/activities with independence for 5 minutes VSS within 7 day(s). 6.  Patient will utilize energy conservation techniques during functional activities with verbal cues within 7 day(s). Occupational Therapy TREATMENT  Patient: Shana Mariee (9 y.o. female)  Date: 6/7/2018  Diagnosis: Chest pain  Chest pain <principal problem not specified>       Precautions:    Chart, occupational therapy assessment, plan of care, and goals were reviewed. ASSESSMENT:  Cleared by RN to see pt for therapy session. Pt received in semisupine in bed, reported she was feeling nauseous and not agreeable to being OOB. Provided pt with handout about energy conservation detailing strategies to conserve energy during bathing, dressing, and IADL tasks. Pt verbalized understanding, handout left on bedside table. Pt will benefit from continued OT to work towards the above goals. Recommend HHOT at discharge. Progression toward goals:  []       Improving appropriately and progressing toward goals  [x]       Improving slowly and progressing toward goals  []       Not making progress toward goals and plan of care will be adjusted     PLAN:  Patient continues to benefit from skilled intervention to address the above impairments. Continue treatment per established plan of care.   Discharge Recommendations:  Home Health  Further Equipment Recommendations for Discharge:  None for OT at this time     SUBJECTIVE:   Patient stated I just feel nauseous.     OBJECTIVE DATA SUMMARY:   Cognitive/Behavioral Status:  Neurologic State: Alert  Orientation Level: Oriented X4  Cognition: Follows commands  Perception: Appears intact  Perseveration: No perseveration noted  Safety/Judgement: Awareness of environment    Functional Mobility and Transfers for ADLs:  Bed Mobility:   Pt declined due to nauseau    Transfers:        Pt declined due to nauseau       Balance:  Sitting: Intact    ADL Intervention:     Patient instructed and indicated understanding energy conservation techniques to increase independence and safety during ADLs,  visual handout provided. Cognitive Retraining  Safety/Judgement: Awareness of environment    Pain:  Pain Scale 1: Numeric (0 - 10)  Pain Intensity 1: 0  Pain Location 1: Back;Generalized     Pain Description 1: Aching  Pain Intervention(s) 1: Medication (see MAR); Repositioned  Activity Tolerance:   Fair  Please refer to the flowsheet for vital signs taken during this treatment.   After treatment:   [] Patient left in no apparent distress sitting up in chair  [x] Patient left in no apparent distress in bed  [x] Call bell left within reach  [x] Nursing notified  [] Caregiver present  [] Bed alarm activated    COMMUNICATION/COLLABORATION:   The patients plan of care was discussed with: Registered Nurse    Truman Levi OT  Time Calculation: 8 mins

## 2018-06-07 NOTE — ROUTINE PROCESS
Bedside shift change report given to Adamaris (oncoming nurse) by Castro Mobley (offgoing nurse). Report included the following information SBAR, Kardex, Intake/Output, MAR, Accordion and Recent Results.

## 2018-06-08 ENCOUNTER — APPOINTMENT (OUTPATIENT)
Dept: CT IMAGING | Age: 63
DRG: 193 | End: 2018-06-08
Attending: INTERNAL MEDICINE
Payer: MEDICARE

## 2018-06-08 LAB
ANION GAP SERPL CALC-SCNC: 10 MMOL/L (ref 5–15)
ATRIAL RATE: 71 BPM
B PERT DNA SPEC QL NAA+PROBE: NOT DETECTED
BUN SERPL-MCNC: 82 MG/DL (ref 6–20)
BUN/CREAT SERPL: 21 (ref 12–20)
C PNEUM DNA SPEC QL NAA+PROBE: NOT DETECTED
CALCIUM SERPL-MCNC: 9.2 MG/DL (ref 8.5–10.1)
CALCULATED P AXIS, ECG09: 51 DEGREES
CALCULATED R AXIS, ECG10: 12 DEGREES
CALCULATED T AXIS, ECG11: 50 DEGREES
CHLORIDE SERPL-SCNC: 105 MMOL/L (ref 97–108)
CO2 SERPL-SCNC: 25 MMOL/L (ref 21–32)
CREAT SERPL-MCNC: 3.99 MG/DL (ref 0.55–1.02)
CRP SERPL-MCNC: <0.29 MG/DL
DIAGNOSIS, 93000: NORMAL
ERYTHROCYTE [DISTWIDTH] IN BLOOD BY AUTOMATED COUNT: 16.3 % (ref 11.5–14.5)
FLUAV H1 2009 PAND RNA SPEC QL NAA+PROBE: NOT DETECTED
FLUAV H1 RNA SPEC QL NAA+PROBE: NOT DETECTED
FLUAV H3 RNA SPEC QL NAA+PROBE: NOT DETECTED
FLUAV SUBTYP SPEC NAA+PROBE: NOT DETECTED
FLUBV RNA SPEC QL NAA+PROBE: NOT DETECTED
GLUCOSE BLD STRIP.AUTO-MCNC: 155 MG/DL (ref 65–100)
GLUCOSE BLD STRIP.AUTO-MCNC: 181 MG/DL (ref 65–100)
GLUCOSE BLD STRIP.AUTO-MCNC: 207 MG/DL (ref 65–100)
GLUCOSE BLD STRIP.AUTO-MCNC: 227 MG/DL (ref 65–100)
GLUCOSE SERPL-MCNC: 157 MG/DL (ref 65–100)
HADV DNA SPEC QL NAA+PROBE: NOT DETECTED
HCOV 229E RNA SPEC QL NAA+PROBE: NOT DETECTED
HCOV HKU1 RNA SPEC QL NAA+PROBE: NOT DETECTED
HCOV NL63 RNA SPEC QL NAA+PROBE: NOT DETECTED
HCOV OC43 RNA SPEC QL NAA+PROBE: NOT DETECTED
HCT VFR BLD AUTO: 27.6 % (ref 35–47)
HGB BLD-MCNC: 8.7 G/DL (ref 11.5–16)
HMPV RNA SPEC QL NAA+PROBE: NOT DETECTED
HPIV1 RNA SPEC QL NAA+PROBE: NOT DETECTED
HPIV2 RNA SPEC QL NAA+PROBE: NOT DETECTED
HPIV3 RNA SPEC QL NAA+PROBE: DETECTED
HPIV4 RNA SPEC QL NAA+PROBE: NOT DETECTED
INR PPP: 1.5 (ref 0.9–1.1)
LACTATE SERPL-SCNC: 0.6 MMOL/L (ref 0.4–2)
M PNEUMO DNA SPEC QL NAA+PROBE: NOT DETECTED
MAGNESIUM SERPL-MCNC: 2 MG/DL (ref 1.6–2.4)
MCH RBC QN AUTO: 29.9 PG (ref 26–34)
MCHC RBC AUTO-ENTMCNC: 31.5 G/DL (ref 30–36.5)
MCV RBC AUTO: 94.8 FL (ref 80–99)
NRBC # BLD: 0 K/UL (ref 0–0.01)
NRBC BLD-RTO: 0 PER 100 WBC
P-R INTERVAL, ECG05: 192 MS
PHOSPHATE SERPL-MCNC: 4.8 MG/DL (ref 2.6–4.7)
PLATELET # BLD AUTO: 166 K/UL (ref 150–400)
PMV BLD AUTO: 10.8 FL (ref 8.9–12.9)
POTASSIUM SERPL-SCNC: 4.6 MMOL/L (ref 3.5–5.1)
PROTHROMBIN TIME: 15.6 SEC (ref 9–11.1)
Q-T INTERVAL, ECG07: 410 MS
QRS DURATION, ECG06: 76 MS
QTC CALCULATION (BEZET), ECG08: 445 MS
RBC # BLD AUTO: 2.91 M/UL (ref 3.8–5.2)
RSV RNA SPEC QL NAA+PROBE: NOT DETECTED
RV+EV RNA SPEC QL NAA+PROBE: NOT DETECTED
SERVICE CMNT-IMP: ABNORMAL
SODIUM SERPL-SCNC: 140 MMOL/L (ref 136–145)
UR CULT HOLD, URHOLD: NORMAL
VENTRICULAR RATE, ECG03: 71 BPM
WBC # BLD AUTO: 10.5 K/UL (ref 3.6–11)

## 2018-06-08 PROCEDURE — 80048 BASIC METABOLIC PNL TOTAL CA: CPT | Performed by: INTERNAL MEDICINE

## 2018-06-08 PROCEDURE — 36415 COLL VENOUS BLD VENIPUNCTURE: CPT | Performed by: NURSE PRACTITIONER

## 2018-06-08 PROCEDURE — 74011636637 HC RX REV CODE- 636/637: Performed by: INTERNAL MEDICINE

## 2018-06-08 PROCEDURE — 77030013140 HC MSK NEB VYRM -A

## 2018-06-08 PROCEDURE — 74011250636 HC RX REV CODE- 250/636: Performed by: INTERNAL MEDICINE

## 2018-06-08 PROCEDURE — 86140 C-REACTIVE PROTEIN: CPT | Performed by: INTERNAL MEDICINE

## 2018-06-08 PROCEDURE — 74011250637 HC RX REV CODE- 250/637: Performed by: INTERNAL MEDICINE

## 2018-06-08 PROCEDURE — 94640 AIRWAY INHALATION TREATMENT: CPT

## 2018-06-08 PROCEDURE — 83735 ASSAY OF MAGNESIUM: CPT | Performed by: INTERNAL MEDICINE

## 2018-06-08 PROCEDURE — 74011000250 HC RX REV CODE- 250: Performed by: INTERNAL MEDICINE

## 2018-06-08 PROCEDURE — 82962 GLUCOSE BLOOD TEST: CPT

## 2018-06-08 PROCEDURE — 94660 CPAP INITIATION&MGMT: CPT

## 2018-06-08 PROCEDURE — 87899 AGENT NOS ASSAY W/OPTIC: CPT | Performed by: INTERNAL MEDICINE

## 2018-06-08 PROCEDURE — 85027 COMPLETE CBC AUTOMATED: CPT | Performed by: INTERNAL MEDICINE

## 2018-06-08 PROCEDURE — 85610 PROTHROMBIN TIME: CPT | Performed by: NURSE PRACTITIONER

## 2018-06-08 PROCEDURE — 74011250637 HC RX REV CODE- 250/637: Performed by: NURSE PRACTITIONER

## 2018-06-08 PROCEDURE — 74011636637 HC RX REV CODE- 636/637: Performed by: PHYSICIAN ASSISTANT

## 2018-06-08 PROCEDURE — 87633 RESP VIRUS 12-25 TARGETS: CPT | Performed by: INTERNAL MEDICINE

## 2018-06-08 PROCEDURE — 84100 ASSAY OF PHOSPHORUS: CPT | Performed by: INTERNAL MEDICINE

## 2018-06-08 PROCEDURE — 74011250637 HC RX REV CODE- 250/637: Performed by: PHYSICIAN ASSISTANT

## 2018-06-08 PROCEDURE — 65660000000 HC RM CCU STEPDOWN

## 2018-06-08 PROCEDURE — 74011000250 HC RX REV CODE- 250: Performed by: NURSE PRACTITIONER

## 2018-06-08 PROCEDURE — 77010033678 HC OXYGEN DAILY

## 2018-06-08 PROCEDURE — 71250 CT THORAX DX C-: CPT

## 2018-06-08 PROCEDURE — 83605 ASSAY OF LACTIC ACID: CPT | Performed by: INTERNAL MEDICINE

## 2018-06-08 RX ORDER — HYDRALAZINE HYDROCHLORIDE 25 MG/1
100 TABLET, FILM COATED ORAL 3 TIMES DAILY
Status: DISCONTINUED | OUTPATIENT
Start: 2018-06-08 | End: 2018-06-15

## 2018-06-08 RX ORDER — DOXYCYCLINE HYCLATE 100 MG
100 TABLET ORAL EVERY 12 HOURS
Status: DISPENSED | OUTPATIENT
Start: 2018-06-08 | End: 2018-06-13

## 2018-06-08 RX ORDER — PREDNISONE 20 MG/1
40 TABLET ORAL
Status: COMPLETED | OUTPATIENT
Start: 2018-06-08 | End: 2018-06-11

## 2018-06-08 RX ORDER — WARFARIN 2 MG/1
4 TABLET ORAL ONCE
Status: COMPLETED | OUTPATIENT
Start: 2018-06-08 | End: 2018-06-08

## 2018-06-08 RX ORDER — HYDROMORPHONE HYDROCHLORIDE 1 MG/ML
0.5 INJECTION, SOLUTION INTRAMUSCULAR; INTRAVENOUS; SUBCUTANEOUS
Status: DISCONTINUED | OUTPATIENT
Start: 2018-06-08 | End: 2018-06-15 | Stop reason: HOSPADM

## 2018-06-08 RX ADMIN — INSULIN LISPRO 4 UNITS: 100 INJECTION, SOLUTION INTRAVENOUS; SUBCUTANEOUS at 16:30

## 2018-06-08 RX ADMIN — INSULIN LISPRO 3 UNITS: 100 INJECTION, SOLUTION INTRAVENOUS; SUBCUTANEOUS at 09:28

## 2018-06-08 RX ADMIN — ATORVASTATIN CALCIUM 80 MG: 20 TABLET, FILM COATED ORAL at 21:55

## 2018-06-08 RX ADMIN — SALINE NASAL SPRAY 2 SPRAY: 1.5 SOLUTION NASAL at 17:37

## 2018-06-08 RX ADMIN — Medication 10 ML: at 05:21

## 2018-06-08 RX ADMIN — DOXYCYCLINE HYCLATE 100 MG: 100 TABLET, COATED ORAL at 21:55

## 2018-06-08 RX ADMIN — HYDROMORPHONE HYDROCHLORIDE 0.5 MG: 1 INJECTION, SOLUTION INTRAMUSCULAR; INTRAVENOUS; SUBCUTANEOUS at 20:00

## 2018-06-08 RX ADMIN — BUMETANIDE 2 MG: 0.25 INJECTION INTRAMUSCULAR; INTRAVENOUS at 17:41

## 2018-06-08 RX ADMIN — CALCITRIOL 0.25 MCG: 0.25 CAPSULE, LIQUID FILLED ORAL at 09:32

## 2018-06-08 RX ADMIN — Medication 10 ML: at 14:00

## 2018-06-08 RX ADMIN — HYDRALAZINE HYDROCHLORIDE 75 MG: 25 TABLET, FILM COATED ORAL at 09:31

## 2018-06-08 RX ADMIN — PREDNISONE 40 MG: 20 TABLET ORAL at 12:48

## 2018-06-08 RX ADMIN — ALBUTEROL SULFATE 2.5 MG: 2.5 SOLUTION RESPIRATORY (INHALATION) at 11:13

## 2018-06-08 RX ADMIN — Medication 400 MG: at 09:32

## 2018-06-08 RX ADMIN — INSULIN LISPRO 3 UNITS: 100 INJECTION, SOLUTION INTRAVENOUS; SUBCUTANEOUS at 12:49

## 2018-06-08 RX ADMIN — Medication 10 ML: at 20:01

## 2018-06-08 RX ADMIN — DOXYCYCLINE HYCLATE 100 MG: 100 TABLET, COATED ORAL at 12:48

## 2018-06-08 RX ADMIN — BUMETANIDE 2 MG: 0.25 INJECTION INTRAMUSCULAR; INTRAVENOUS at 09:35

## 2018-06-08 RX ADMIN — HYDROMORPHONE HYDROCHLORIDE 0.5 MG: 2 INJECTION INTRAMUSCULAR; INTRAVENOUS; SUBCUTANEOUS at 01:00

## 2018-06-08 RX ADMIN — CARVEDILOL 25 MG: 12.5 TABLET, FILM COATED ORAL at 09:30

## 2018-06-08 RX ADMIN — STANDARDIZED SENNA CONCENTRATE AND DOCUSATE SODIUM 1 TABLET: 8.6; 5 TABLET, FILM COATED ORAL at 09:31

## 2018-06-08 RX ADMIN — IPRATROPIUM BROMIDE 1 DOSE: 0.5 SOLUTION RESPIRATORY (INHALATION) at 23:21

## 2018-06-08 RX ADMIN — ALBUTEROL SULFATE 2.5 MG: 2.5 SOLUTION RESPIRATORY (INHALATION) at 18:09

## 2018-06-08 RX ADMIN — HYDRALAZINE HYDROCHLORIDE 100 MG: 25 TABLET ORAL at 21:55

## 2018-06-08 RX ADMIN — METOLAZONE 5 MG: 5 TABLET ORAL at 09:30

## 2018-06-08 RX ADMIN — Medication 400 MG: at 17:36

## 2018-06-08 RX ADMIN — HYDROMORPHONE HYDROCHLORIDE 0.5 MG: 2 INJECTION INTRAMUSCULAR; INTRAVENOUS; SUBCUTANEOUS at 13:46

## 2018-06-08 RX ADMIN — HYDRALAZINE HYDROCHLORIDE 100 MG: 25 TABLET ORAL at 17:36

## 2018-06-08 RX ADMIN — CARVEDILOL 25 MG: 12.5 TABLET, FILM COATED ORAL at 17:36

## 2018-06-08 RX ADMIN — INSULIN LISPRO 2 UNITS: 100 INJECTION, SOLUTION INTRAVENOUS; SUBCUTANEOUS at 21:54

## 2018-06-08 RX ADMIN — ASPIRIN 81 MG: 81 TABLET, COATED ORAL at 09:31

## 2018-06-08 RX ADMIN — ISOSORBIDE MONONITRATE 120 MG: 60 TABLET ORAL at 09:32

## 2018-06-08 RX ADMIN — AMLODIPINE BESYLATE 10 MG: 5 TABLET ORAL at 09:32

## 2018-06-08 RX ADMIN — ALLOPURINOL 100 MG: 100 TABLET ORAL at 09:32

## 2018-06-08 RX ADMIN — POLYETHYLENE GLYCOL (3350) 17 G: 17 POWDER, FOR SOLUTION ORAL at 09:27

## 2018-06-08 RX ADMIN — WARFARIN SODIUM 4 MG: 2 TABLET ORAL at 17:36

## 2018-06-08 NOTE — PROGRESS NOTES
Problem: Falls - Risk of  Goal: *Absence of Falls  Document Sarah Fall Risk and appropriate interventions in the flowsheet.    Outcome: Progressing Towards Goal  Fall Risk Interventions:  Mobility Interventions: Assess mobility with egress test, Bed/chair exit alarm, Patient to call before getting OOB, PT Consult for mobility concerns         Medication Interventions: Assess postural VS orthostatic hypotension, Bed/chair exit alarm, Patient to call before getting OOB, Teach patient to arise slowly         History of Falls Interventions: Bed/chair exit alarm, Consult care management for discharge planning, Door open when patient unattended

## 2018-06-08 NOTE — ACP (ADVANCE CARE PLANNING)
Responded to In [employer] fro an Advance Medical Directive (AMD) consult. Consulted with Palliative Care RN practitioner who indicated she has been in discussion with Miss Bunny Cantu about an AMD.  Jessie Lee will defer the continuing conversation to Palliative. If  assistance is needed please page .   Visited by: Ella Lopez [address] [place] ([id number])

## 2018-06-08 NOTE — PROGRESS NOTES
Cardiology Progress Note                             [address], [place], [address] [facility]                                 Phone [phone]; Fax [phone]        2018 9:23 AM     Admit Date:           2018  Admit Diagnosis:  Chest pain  Chest pain  :          1955   MRN:          [id number]   ASSESSMENT/RECOMMENDATION:   1)Chronic recurrent anginal chest pain, exertional and non exertional.  - CAD with 1v disease/RCA  -cont ASA/statin/BB  - C cath unable to be done d/t respiratory status. -on high dose of imdur       2) Chronic HFpEF, class III, s/p CardioMEMS for PA monitoring:   - RHC yesterday showing Moderate-severe PA HTN, post capillary. Marked elevation in biV filling pressures. Cardiomem re- calibrated   - will continue IV bumex 2 mg BID, monitor creatinine closely       3) CKD  - creatinine stable, will continue aggressive diuresis, metolazone added per nephrology   - Nephrology following, appreciate recommendations  - she does have an AV fistula      4) Chronic severe anemia, refractory to Procrit:   - hematology following   S/p BMB- results pending   H/H stable      5) Chronic anticoagulation for DVT   - s/p Vit K and FFP (prior to BM biopsy)   - coumadin restarted. INr sub therapeutic     6) Hypertension: -160's. Increase hydralazine 100 mg to TID  - coreg/norvasc/imdur. 7) Hypo magensium: resolved     8) Congested cough: CT of chest this AM showing new groundglass areas of opacity and mild consolidation in the right upper lobe and lingula most consistent with superimposed pneumonia or alveolitis on chronic interstitial lung disease. D/w attending, pulmonary to be consulted.  -prn nebs  -WBC WNL/afebrile     Cardiology Attending:    Patient personally seen and examined. All the elements of history and examination were personally performed.  Assessment and plan was discussed and agree as written above. - Strict I and O. Once she has optimal fluid balance will plan for [facility]. Continue IV diuresis. - Possibility of alveolitis ?pneumonia to be ruled out. Yuan Sam MD, Adalgisa Bustamante                  06/08 0701 - 06/08 1900  In: -   Out: 9038 [PCFUO:3968]    Last 3 Recorded Weights in this Encounter    06/06/18 0617 06/07/18 0004 06/08/18 0407   Weight: 332 lb 14.3 oz (151 kg) 334 lb (151.5 kg) 328 lb 7.8 oz (149 kg)         06/06 1901 - 06/08 0700  In: 880 [P.O.:580; I.V.:300]  Out: 1200 [Urine:1200]    SUBJECTIVE               Rissa Onel reports congested cough- non productive. SOB unchanged. + nausea.  + cough  No chest pain        Current Facility-Administered Medications   Medication Dose Route Frequency    metOLazone (ZAROXOLYN) tablet 5 mg  5 mg Oral DAILY    epoetin charlie (EPOGEN;PROCRIT) injection 10,000 Units  10,000 Units SubCUTAneous Once per day on Thu Sat    ondansetron (ZOFRAN ODT) tablet 8 mg  8 mg Oral Q8H PRN    hydrALAZINE (APRESOLINE) tablet 75 mg  75 mg Oral TID    magnesium oxide (MAG-OX) tablet 400 mg  400 mg Oral BID    lidocaine (XYLOCAINE) 10 mg/mL (1 %) injection 10-20 mL  10-20 mL IntraDERMal Multiple    sodium chloride (NS) flush 5-10 mL  5-10 mL IntraVENous Q8H    sodium chloride (NS) flush 5-10 mL  5-10 mL IntraVENous PRN    bumetanide (BUMEX) injection 2 mg  2 mg IntraVENous BID    sodium chloride (OCEAN) 0.65 % nasal squeeze bottle 2 Spray  2 Spray Both Nostrils Q2H PRN    HYDROmorphone (DILAUDID) injection 0.5 mg  0.5 mg IntraVENous Q4H PRN    0.9% sodium chloride infusion 250 mL  250 mL IntraVENous PRN    nitroglycerin (NITROSTAT) tablet 0.4 mg  0.4 mg SubLINGual PRN    oxyCODONE-acetaminophen (PERCOCET 7.5) 7.5-325 mg per tablet 1 Tab  1 Tab Oral Q6H PRN    Warfarin: pharmacy to dose    Other Rx Dosing/Monitoring    amLODIPine (NORVASC) tablet 10 mg  10 mg Oral DAILY    albuterol (PROVENTIL VENTOLIN) nebulizer solution 2.5 mg  2.5 mg Nebulization Q6H PRN    allopurinol (ZYLOPRIM) tablet 100 mg  100 mg Oral DAILY    aspirin delayed-release tablet 81 mg  81 mg Oral DAILY    atorvastatin (LIPITOR) tablet 80 mg  80 mg Oral QHS    calcitRIOL (ROCALTROL) capsule 0.25 mcg  0.25 mcg Oral Q M, W, F & SAT    carvedilol (COREG) tablet 25 mg  25 mg Oral BID WITH MEALS    ergocalciferol (ERGOCALCIFEROL) capsule 50,000 Units  50,000 Units Oral every Tuesday    fluticasone (FLONASE) 50 mcg/actuation nasal spray 2 Spray  2 Spray Both Nostrils DAILY PRN    hydrOXYzine HCl (ATARAX) tablet 25 mg  25 mg Oral TID PRN    isosorbide mononitrate ER (IMDUR) tablet 120 mg  120 mg Oral DAILY    pantoprazole (PROTONIX) tablet 40 mg  40 mg Oral ACB    polyethylene glycol (MIRALAX) packet 17 g  17 g Oral DAILY    senna-docusate (PERICOLACE) 8.6-50 mg per tablet 1 Tab  1 Tab Oral DAILY    sodium chloride (NS) flush 5-10 mL  5-10 mL IntraVENous Q8H    sodium chloride (NS) flush 5-10 mL  5-10 mL IntraVENous PRN    naloxone (NARCAN) injection 0.4 mg  0.4 mg IntraVENous PRN    glucose chewable tablet 16 g  4 Tab Oral PRN    dextrose (D50W) injection syrg 12.5-25 g  12.5-25 g IntraVENous PRN    glucagon (GLUCAGEN) injection 1 mg  1 mg IntraMUSCular PRN    acetaminophen (TYLENOL) tablet 650 mg  650 mg Oral Q4H PRN    diphenhydrAMINE (BENADRYL) capsule 25 mg  25 mg Oral Q4H PRN    insulin lispro (HUMALOG) injection   SubCUTAneous AC&HS      OBJECTIVE               Intake/Output Summary (Last 24 hours) at 06/08/18 0931  Last data filed at 06/08/18 0920   Gross per 24 hour   Intake              730 ml   Output             2500 ml   Net            -1770 ml       Review of Systems - History obtained from the patient AS PER  HPI    Telemetry NSR    PHYSICAL EXAM        Visit Vitals    /73 (BP 1 Location: Right arm, BP Patient Position: At rest)    Pulse 76    Temp 99.6 °F (37.6 °C)    Resp 18    Ht 5' 10\" (1.778 m)    Wt 328 lb 7.8 oz (149 kg)    SpO2 100%    BMI 47.13 kg/m2       Gen: Well-developed, obese, in no acute distress  alert and oriented x 3  HEENT:  Pink conjunctivae, Hearing grossly normal.No scleral icterus or conjunctival, moist mucous membranes  Neck: Supple, JVD difficult to appreciate  Resp: No accessory muscle use, diminished BS steph bases R>L, scant exp wheezing, mild congested cough  Card: Regular Rate,Rythm, No murmurs, rubs or gallop. No thrills. GI:          soft, non-tender   MSK: No cyanosis or clubbing, good capillary refill  Skin: No rashes or ulcers, no bruising. Neuro:  Cranial nerves are grossly intact, moving all four extremities, no focal deficit, follows commands appropriately  Psych:  Good insight, oriented to person, place and time, alert, Nml Affect  LE: +1-2 pitting edema       DATA REVIEW            Laboratory and Imaging have been reviewed by me and are notable for  No results for input(s): CPK, CKMB, TROIQ in the last 72 hours.   Recent Labs      06/08/18   0105  06/07/18   0530  06/06/18   0021   NA  140  137  138   K  4.6  4.5  4.4   CO2  25  23  24   BUN  82*  84*  72*   CREA  3.99*  4.07*  4.13*   GLU  157*  147*  297*   PHOS  4.8*  4.8*   --    MG  2.0  2.0  1.9   WBC  10.5  11.0  11.2*  5.7   HGB  8.7*  8.3*  8.4*  8.4*   HCT  27.6*  26.9*  26.7*  26.1*   PLT  166  157  152  146*             Mariza Saxena, NP

## 2018-06-08 NOTE — PROGRESS NOTES
[facility] Dosing Services: 6/8/18    Consult for Warfarin Dosing by Pharmacy by JUANCHO Sabillon NP. Consult provided for this 63 y.o.  female , for indication of Venous Thrombosis. Day of Therapy: Continued from home. 2 mg on Mon, Fri and 3 mg (1.5 tabs) on Sun, Tue, Wed, Thurs, Sat   Dose to achieve an INR goal of 2-3    Order entered for  Warfarin  4 (mg) ordered to be given today at 18:00. Significant drug interactions: allopurinol  Previous dose given 4 mg   PT/INR Lab Results   Component Value Date/Time    INR 1.5 (H) 06/08/2018 01:05 AM      Platelets Lab Results   Component Value Date/Time    PLATELET 851 48/10/1349 01:05 AM      H/H Lab Results   Component Value Date/Time    HGB 8.7 (L) 06/08/2018 01:05 AM        Pharmacy to follow daily and will provide subsequent Warfarin dosing based on clinical status.   Rosi Vieyra  Contact information [phone]

## 2018-06-08 NOTE — PROGRESS NOTES
Occupational Therapy Note:  Chart reviewed. Attempted to see patient however she declined d/t fatigue. Patient requesting to rest and defer all activity at this time. Will continue to follow.   Hailey Chapa OTR/L

## 2018-06-08 NOTE — PROGRESS NOTES
PHYSICAL THERAPY NOTE:  Treatment attempted with patient declining, pt's  also present stating \"She's too groggy to do therapy. \"   Will continue to follow. Thank you.

## 2018-06-08 NOTE — PROGRESS NOTES
Nutrition Assessment:    RECOMMENDATIONS/INTERVENTION(S):       1. Recommend advancing diet as tolerated  to  Cardiac 2 gm/Consistent Carbohydrate 2200 kcal  2. Encourage liquid intake, try Ensure High Protein Q meal, once tolerating solids well, adjust ONS    Monitor PO intakes, weight, BG    ASSESSMENT:   6/8: Patient seen for f/u, visited pt and . Pt c/o's very poor appetite. Nausea at times, diet downgraded to clear liquids. Spoke with Dr. Sobeida Canas and nurse, will try Ensure High Protein for additional nutrition. Pt tolerated Ensure well, RD to order Q meal until tolerating solids well, adjust ONS as needed. 6/5: 63 yr old female admitted for chest pain. Pt screened for LOS. Pt currently NPO- had cardiac cath placed earlier today. Advance diet as able to Cardiac 2 gm/CCD 2200 kcal. Pt with history of DM, A1c 5.2, Hgb 8.4, so A1c likely inaccurate.  ,122, 121 mg/dL, , 125, 198, 202 mg/dL. Weight relatively stable. Usual weight between 330-345 lbs. Currently 334 lbs. When pt was on a diet, she ate 100% of tray. Appetite normal. Pt hungry after being NPO all day for cardiac cath. No history of chew/swallow difficulties. No n/v currently. No food allergies. Last BM 5/29. On miralax and colace. Pt may require additional bowel regimen.  BLE 2+, BUE 1+    SUBJECTIVE/OBJECTIVE:   Diet Order: Clear, [place], Advance as tolerated  % Eaten:    Patient Vitals for the past 168 hrs:   % Diet Eaten   06/06/18 1255 100 %   06/06/18 0918 100 %   06/05/18 1856 100 %   06/01/18 1745 100 %     Pertinent Medications: [x] Reviewed: Coumadin, Pericolace, Prednisone, Miralax, Protonix, Mag-OX, SSI, Dilaudid, Epogen, Doxycyline,  Bumex    Past Medical History:   Diagnosis Date     Sleep Apnea 2/16/2011    Aortic [facility]     CAD (coronary Artery Disease) Native Artery 2/16/2011    CKD (chronic kidney disease) stage 4, GFR 15-29 ml/min ([facility] 2/10/2017    COPD [facility] obstructive pulmonary disease) [facility]  Diabetic gastroparesis ([address]     Diastolic heart failure ([address] 10/5/2012    DM type 2 causing neurological disease ([address]     DM type 2 causing renal disease ([address]     DM type 2 causing vascular disease ([address]     Esophageal stricture 2012    dialted Dr. Mena Leonardo [id number] deficiency [facility])     trait    GERD (gastroesophageal reflux disease)     Gout     ILD (interstitial lung disease) ([facility]     open lung bx CJW 2010    Morbid obesity ([address]     OA (osteoarthritis)     Ovarian cancer ([address]     cervical and uterine    Rheumatoid arteritis     S/P left pulmonary artery pressure sensor implant placement 8/31/2017    [employer]     Tobacco use disorder 3/21/2012    Uterine cervix cancer ([facility]     Vitamin D deficiency 8/9/2013        Chemistries:  Lab Results   Component Value Date/Time    Sodium 140 06/08/2018 01:05 AM    Potassium 4.6 06/08/2018 01:05 AM    Chloride 105 06/08/2018 01:05 AM    CO2 25 06/08/2018 01:05 AM    Anion gap 10 06/08/2018 01:05 AM    Glucose 157 (H) 06/08/2018 01:05 AM    BUN 82 (H) 06/08/2018 01:05 AM    Creatinine 3.99 (H) 06/08/2018 01:05 AM    BUN/Creatinine ratio 21 (H) 06/08/2018 01:05 AM    GFR est AA 14 (L) 06/08/2018 01:05 AM    GFR est non-AA 11 (L) 06/08/2018 01:05 AM    Calcium 9.2 06/08/2018 01:05 AM    AST (SGOT) 24 11/21/2017 12:40 PM    Alk. phosphatase 94 11/21/2017 12:40 PM    Protein, total 6.8 11/21/2017 12:40 PM    Albumin 3.2 (L) 11/21/2017 12:40 PM    Globulin 3.6 11/21/2017 12:40 PM    A-G Ratio 0.9 (L) 11/21/2017 12:40 PM    ALT (SGPT) 19 11/21/2017 12:40 PM      Anthropometrics: Height: 5' 10\" (177.8 cm) Weight: 149 kg (328 lb 7.8 oz)    IBW (%IBW): 68 kg (150 lb) (218.99 %) UBW (%UBW): 149.7 kg (330 lb) (to 345 lbs) (99.54 %)    BMI: Body mass index is 47.13 kg/(m^2).     This BMI is indicative of:     [] Underweight    [] Normal    [] Overweight    []  Obesity    [x]  Extreme Obesity (BMI>40)    Estimated Nutrition Needs (Based on): 8002 Kcals/day ([id number]. 3)-500) , 121 g (-152g/day(0.8-1.0g/kg)) Protein  Carbohydrate: At Least 130 g/day  Fluids: 2300 mL/day (1mL/kg rounded to 50 mL)    Last BM: 6/6  []Active     []Hyperactive  []Hypoactive       [] Absent   BS: not charted  Skin:    [] Intact   [x] Incision - cardiac cath placed  [] Breakdown   [] DTI   [] Tears/Excoriation/Abrasion  [x]Edema: LUE, LLE, RUE, RLE [] Other:    Wt Readings from Last 30 Encounters:   06/08/18 149 kg (328 lb 7.8 oz)   05/15/18 152.1 kg (335 lb 6.4 oz)   04/03/18 153 kg (337 lb 3.2 oz)   03/20/18 153.8 kg (339 lb)   02/22/18 153.8 kg (339 lb)   01/24/18 154.7 kg (341 lb)   01/10/18 156.5 kg (345 lb)   12/22/17 153.8 kg (339 lb)   12/19/17 152.9 kg (337 lb)   12/06/17 156 kg (344 lb)   11/29/17 155.1 kg (342 lb)   11/21/17 154.2 kg (340 lb)   11/20/17 156.9 kg (346 lb)   11/14/17 156.9 kg (346 lb)   10/25/17 151.5 kg (334 lb)   10/10/17 149.7 kg (330 lb)   09/15/17 145.6 kg (321 lb)   09/12/17 149.2 kg (329 lb)   09/05/17 152 kg (335 lb)   08/31/17 151.1 kg (333 lb 1.8 oz)   08/18/17 152.4 kg (336 lb)   08/15/17 153.8 kg (339 lb)   08/03/17 155.1 kg (342 lb)   07/11/17 151.2 kg (333 lb 6.4 oz)   06/07/17 155.6 kg (343 lb)   05/24/17 151.9 kg (334 lb 12.8 oz)   05/17/17 151.5 kg (334 lb)   05/10/17 152.6 kg (336 lb 6.4 oz)   04/17/17 145.1 kg (319 lb 12.8 oz)   03/29/17 144.7 kg (319 lb)      NUTRITION DIAGNOSES:   Problem:  Inadequate energy intake     Etiology: related to decreased ability to consume sufficient energy     Signs/Symptoms: as evidenced by NPO status, large stature     6/8: Now with nausea, poor appetite, on clear liquids ( advance as tolerated, OK by MD to add Ensure High Protein)    NUTRITION INTERVENTIONS:  Meals/Snacks: General/healthful diet   Supplements: Commercial supplement              GOAL:   Pt will tolerate Ensure HIgh Protein, with diet advancement to solids within 3-5 days    Cultural, Confucianism, or Ethnic Dietary Needs: None     LEARNING NEEDS (Diet, Food/Nutrient-Drug Interaction):    [x] None Identified   [] Identified and Education Provided/Documented   [] Identified and Pt declined/was not appropriate      [x] Interdisciplinary Care Plan Reviewed/Documented    [x] Discharge Needs:    TBD   [] No Nutrition Related Discharge Needs    NUTRITION RISK:   Pt Is At Nutrition Risk  [x]     No Nutrition Risk Identified  []       PT SEEN FOR:    []  MD Consult: []Calorie Count      []Diabetic Diet Education        []Diet Education     []Electrolyte Management     []General Nutrition Management and Supplements     []Management of Tube Feeding     []TPN Recommendations    []  RN Referral:  []MST score >=2     []Enteral/Parenteral Nutrition PTA     []Pregnant: Gestational DM or Multigestation                 [] Pressure Ulcer      []  Low BMI      []  Length of Stay       [] Dysphagia Diet     [] Ventilator      [x] Follow-Up        Previous Recommendations:   [x] Implemented          [] Not Implemented          [] Not Applicable    Previous Goal:   [] Met              [] Progressing Towards Goal              [x] Not Progressing Towards Goal   [] Not Applicable              Mary Griffith, [address] [place] [address]  Pager: [phone]  Office: [phone]

## 2018-06-08 NOTE — PROGRESS NOTES
Responded to In [employer] for an Advance Medical Directive (AMD) consult. Consulted with Palliative Care RN practitioner who indicated she has been in discussion with Miss Varun Land about an AMD.  Meghan Maldonado will defer the continuing conversation to Palliative. If  assistance is needed please page .   Visited by: Garrett Germain [address] ([id number])

## 2018-06-08 NOTE — PROGRESS NOTES
Ezio Craig [facility] [place] [address]  [address] [facility], [address] [place] [address]  [phone]      Medical Progress Note      NAME: Triny Mirza   :  1955  MRM:  [id number]    Date/Time: 2018  9:37 AM       Assessment and Plan:     Chronic respiratory failure with hypoxia / COPD, severe / ILD (interstitial lung disease) / Pulmonary HTN - POA, mod to severe plm HTN laquita contributes to her LARA and hypoxia.  No curative treatment, just diuretics.  Likely to progress. Palliative care.  Continue Oxygen as needed. CT this AM shows multiple complex abnormalities, but unclear if infectious process. No fever, no leukocytosis. Consult pulm to give opinion   Addendum - tested positive for parainfluenza virus      Chronic diastolic heart failure - POA, driven by ILD, pulm HTN, progression to cor pulmonale is in her future.  MEMs place to monitor pulm HTN. Continue bumex at higher dose, added metolazone, strict I's and O's, daily weights, low salt      DM (diabetes mellitus), type 2 with renal, vascular and neurological complications - Diabetic diet and counseling.  SSI per protocol. Not on home meds. ESRD has likely potentiated her endogenous insulin. A1c Useless in setting of anemia.       CKD (chronic kidney disease) stage 4 - POA and a bit worse after cath, now slightly better.  Nephrology consulted. Crystal Ani to progress to ESRD. Nadya Thomas is a poor candidate for HD.   She has an AV fistula      CAD (coronary Artery Disease) Native Artery / HTN (hypertension) / Chest pain / Dyspnea - Pain chronic, unlikely AMI.  Cath failed. Continue ASA, statin, BB, Imdur, as medical management.  No further plans for cath or workup.      Nausea and anorexia / Gastroparesis / Esophageal reflux - PPI. Better this AM.  May be due to poor GI circulation resulting from excess diuresis. Start PPI. Clear liquid diet. Monitor.   Normal lactic acid.     Morbid obesity / JASEN on CPAP - continue CPAP, advise weight loss     Normocytic anemia - POA, heme/onc consulted. Joss Edilia just chronic disease. s/p BMB, pathology negative. EPO per renal.      Hx of deep venous thrombosis / Warfarin anticoagulation - Coumadin was held for cath, now back. diagnosed with DVT in 6/2017. Does not need bridge.      Hypomagnesemia - Replete and follow.      Gout - stable on allopurinol          Subjective:     Chief Complaint:  Nausea a bit better, though still wants only clear liquids. Dyspnea unchanged    ROS:  (bold if positive, if negative)    Cough SOB/LARA  Tolerating some PT  Tolerating clear Diet        Objective:     Last 24hrs VS reviewed since prior progress note.  Most recent are:    Visit Vitals    /73 (BP 1 Location: Right arm, BP Patient Position: At rest)    Pulse 76    Temp 99.6 °F (37.6 °C)    Resp 18    Ht 5' 10\" (1.778 m)    Wt 149 kg (328 lb 7.8 oz)    SpO2 100%    BMI 47.13 kg/m2     SpO2 Readings from Last 6 Encounters:   06/08/18 100%   05/15/18 91%   04/03/18 96%   03/20/18 98%   01/24/18 98%   01/10/18 95%    O2 Flow Rate (L/min): 2 l/min     Intake/Output Summary (Last 24 hours) at 06/08/18 8111  Last data filed at 06/08/18 0920   Gross per 24 hour   Intake              730 ml   Output             2500 ml   Net            -1770 ml        Physical Exam:    Gen:  Morbid obese, in no acute distress  HEENT:  Pink conjunctivae, PERRL, hearing intact to voice, moist mucous membranes  Neck:  Supple, without masses, thyroid non-tender  Resp:  No accessory muscle use, distant breath sounds with some wheezes and rhonchi  Card:  No murmurs, distant S1, S2 without thrills, bruits, 2+ peripheral edema  Abd:  Soft, non-tender, obese distended, normoactive bowel sounds are present, no mass  Lymph:  No cervical or inguinal adenopathy  Musc:  No cyanosis or clubbing  Skin:  No rashes or ulcers, skin turgor is good, R wrist in brace  Neuro:  Cranial nerves are grossly intact, no focal motor weakness, follows commands   Psych:  Poor insight, oriented to person, place and time, alert    Telemetry reviewed:   normal sinus rhythm  __________________________________________________________________  Medications Reviewed: (see below)  Medications:     Current Facility-Administered Medications   Medication Dose Route Frequency    hydrALAZINE (APRESOLINE) tablet 100 mg  100 mg Oral TID    metOLazone (ZAROXOLYN) tablet 5 mg  5 mg Oral DAILY    epoetin charlie (EPOGEN;PROCRIT) injection 10,000 Units  10,000 Units SubCUTAneous Once per day on Thu Sat    ondansetron (ZOFRAN ODT) tablet 8 mg  8 mg Oral Q8H PRN    magnesium oxide (MAG-OX) tablet 400 mg  400 mg Oral BID    lidocaine (XYLOCAINE) 10 mg/mL (1 %) injection 10-20 mL  10-20 mL IntraDERMal Multiple    sodium chloride (NS) flush 5-10 mL  5-10 mL IntraVENous Q8H    sodium chloride (NS) flush 5-10 mL  5-10 mL IntraVENous PRN    bumetanide (BUMEX) injection 2 mg  2 mg IntraVENous BID    sodium chloride (OCEAN) 0.65 % nasal squeeze bottle 2 Spray  2 Spray Both Nostrils Q2H PRN    HYDROmorphone (DILAUDID) injection 0.5 mg  0.5 mg IntraVENous Q4H PRN    0.9% sodium chloride infusion 250 mL  250 mL IntraVENous PRN    nitroglycerin (NITROSTAT) tablet 0.4 mg  0.4 mg SubLINGual PRN    oxyCODONE-acetaminophen (PERCOCET 7.5) 7.5-325 mg per tablet 1 Tab  1 Tab Oral Q6H PRN    Warfarin: pharmacy to dose    Other Rx Dosing/Monitoring    amLODIPine (NORVASC) tablet 10 mg  10 mg Oral DAILY    albuterol (PROVENTIL VENTOLIN) nebulizer solution 2.5 mg  2.5 mg Nebulization Q6H PRN    allopurinol (ZYLOPRIM) tablet 100 mg  100 mg Oral DAILY    aspirin delayed-release tablet 81 mg  81 mg Oral DAILY    atorvastatin (LIPITOR) tablet 80 mg  80 mg Oral QHS    calcitRIOL (ROCALTROL) capsule 0.25 mcg  0.25 mcg Oral Q M, W, F & SAT    carvedilol (COREG) tablet 25 mg  25 mg Oral BID WITH MEALS    ergocalciferol (ERGOCALCIFEROL) capsule 50,000 Units  50,000 Units Oral every Tuesday    fluticasone (FLONASE) 50 mcg/actuation nasal spray 2 Spray  2 Spray Both Nostrils DAILY PRN    hydrOXYzine HCl (ATARAX) tablet 25 mg  25 mg Oral TID PRN    isosorbide mononitrate ER (IMDUR) tablet 120 mg  120 mg Oral DAILY    pantoprazole (PROTONIX) tablet 40 mg  40 mg Oral ACB    polyethylene glycol (MIRALAX) packet 17 g  17 g Oral DAILY    senna-docusate (PERICOLACE) 8.6-50 mg per tablet 1 Tab  1 Tab Oral DAILY    sodium chloride (NS) flush 5-10 mL  5-10 mL IntraVENous Q8H    sodium chloride (NS) flush 5-10 mL  5-10 mL IntraVENous PRN    naloxone (NARCAN) injection 0.4 mg  0.4 mg IntraVENous PRN    glucose chewable tablet 16 g  4 Tab Oral PRN    dextrose (D50W) injection syrg 12.5-25 g  12.5-25 g IntraVENous PRN    glucagon (GLUCAGEN) injection 1 mg  1 mg IntraMUSCular PRN    acetaminophen (TYLENOL) tablet 650 mg  650 mg Oral Q4H PRN    diphenhydrAMINE (BENADRYL) capsule 25 mg  25 mg Oral Q4H PRN    insulin lispro (HUMALOG) injection   SubCUTAneous AC&HS        Lab Data Reviewed: (see below)  Lab Review:     Recent Labs      06/08/18   0105 06/07/18   0530  06/06/18   0021   WBC  10.5  11.0  11.2*  5.7   HGB  8.7*  8.3*  8.4*  8.4*   HCT  27.6*  26.9*  26.7*  26.1*   PLT  166  157  152  146*     Recent Labs      06/08/18 0105  06/07/18   0530  06/06/18   0021   NA  140  137  138   K  4.6  4.5  4.4   CL  105  106  104   CO2  25  23  24   GLU  157*  147*  297*   BUN  82*  84*  72*   CREA  3.99*  4.07*  4.13*   CA  9.2  8.9  9.2   MG  2.0  2.0  1.9   PHOS  4.8*  4.8*   --    INR  1.5*  1.5*  1.3*     Lab Results   Component Value Date/Time    Glucose (POC) 181 (H) 06/08/2018 07:41 AM    Glucose (POC) 182 (H) 06/07/2018 08:51 PM    Glucose (POC) 148 (H) 06/07/2018 04:24 PM    Glucose (POC) 150 (H) 06/07/2018 11:14 AM    Glucose (POC) 145 (H) 06/07/2018 07:18 AM     No results for input(s): PH, PCO2, PO2, HCO3, FIO2 in the last 72 hours.   Recent Labs      06/08/18   0105  06/07/18   0530  06/06/18   0021   INR  1.5* 1. 5*  1.3*     All Micro Results     Procedure Component Value Units Date/Time    RESPIRATORY PANEL,PCR,NASOPHARYNGEAL [[id number]] Collected:  06/08/18 0824    Order Status:  Completed Specimen:  NASOPHARYNGEAL SWAB Updated:  06/08/18 0836    RAYSHAWN Harkins, UR/CSF [[id number]] Collected:  06/08/18 6940    Order Status:  Completed Specimen:  Other Updated:  06/08/18 0835    URINE CULTURE HOLD SAMPLE [[id number]] Collected:  06/08/18 0826    Order Status:  Completed Specimen:  Serum Updated:  06/08/18 0835     Urine culture hold         URINE ON HOLD IN MICROBIOLOGY DEPT FOR 3 DAYS. IF UNPRESERVED URINE IS SUBMITTED, IT CANNOT BE USED FOR ADDITIONAL TESTING AFTER 24 HRS, RECOLLECTION WILL BE REQUIRED. I have reviewed notes of prior 24hr.     Other pertinent lab: none    Total time spent with patient: [address] discussed with: Patient, Care Manager, Nursing Staff, Consultant/Specialist and >50% of time spent in counseling and coordination of care    Discussed:  Care Plan and D/C Planning    Prophylaxis:  H2B/PPI    Disposition:  Home w/Family           ___________________________________________________    Attending Physician: Massimo Mcfarlane MD

## 2018-06-08 NOTE — PROGRESS NOTES
[address] [facility]  YOB: 1955          Assessment & Plan:   CKD 4  · Cr stable despite high doses of combination diuretics  · She has high filling pressures and needs diuresis  · No HD yet  · Cont IV Loops + metolazone today  · Daily wts: 149 from 151   · Has AVF which is ready for use    CP  · On oxygen  · Cath 6 5 18:rev    HTN   · 140-160S:Amlodipine,Corge,Loops,Hydralazine    SHPT   · Calcitriol    Anemia of CKD   · Resume 2 times a week EPO here, we will debate changing Epo to Aranesp out pt  · S/p BMB  : normal       Subjective:   CC: f/u CKD  HPI:   CKD: cr stable  Wt down  Cr same. HTN  Is acceptale  Fair urine.   Anemia severe and on BIW Epo  Wt better  ROS: no n/v/ neg for 10 sys except in HPI  Current Facility-Administered Medications   Medication Dose Route Frequency    metOLazone (ZAROXOLYN) tablet 5 mg  5 mg Oral DAILY    epoetin charlie (EPOGEN;PROCRIT) injection 10,000 Units  10,000 Units SubCUTAneous Once per day on Thu Sat    ondansetron (ZOFRAN ODT) tablet 8 mg  8 mg Oral Q8H PRN    hydrALAZINE (APRESOLINE) tablet 75 mg  75 mg Oral TID    magnesium oxide (MAG-OX) tablet 400 mg  400 mg Oral BID    lidocaine (XYLOCAINE) 10 mg/mL (1 %) injection 10-20 mL  10-20 mL IntraDERMal Multiple    sodium chloride (NS) flush 5-10 mL  5-10 mL IntraVENous Q8H    sodium chloride (NS) flush 5-10 mL  5-10 mL IntraVENous PRN    bumetanide (BUMEX) injection 2 mg  2 mg IntraVENous BID    sodium chloride (OCEAN) 0.65 % nasal squeeze bottle 2 Spray  2 Spray Both Nostrils Q2H PRN    HYDROmorphone (DILAUDID) injection 0.5 mg  0.5 mg IntraVENous Q4H PRN    0.9% sodium chloride infusion 250 mL  250 mL IntraVENous PRN    nitroglycerin (NITROSTAT) tablet 0.4 mg  0.4 mg SubLINGual PRN    oxyCODONE-acetaminophen (PERCOCET 7.5) 7.5-325 mg per tablet 1 Tab  1 Tab Oral Q6H PRN    Warfarin: pharmacy to dose    Other Rx Dosing/Monitoring    amLODIPine (NORVASC) tablet 10 mg  10 mg Oral DAILY    albuterol (PROVENTIL VENTOLIN) nebulizer solution 2.5 mg  2.5 mg Nebulization Q6H PRN    allopurinol (ZYLOPRIM) tablet 100 mg  100 mg Oral DAILY    aspirin delayed-release tablet 81 mg  81 mg Oral DAILY    atorvastatin (LIPITOR) tablet 80 mg  80 mg Oral QHS    calcitRIOL (ROCALTROL) capsule 0.25 mcg  0.25 mcg Oral Q , , F & SAT    carvedilol (COREG) tablet 25 mg  25 mg Oral BID WITH MEALS    ergocalciferol (ERGOCALCIFEROL) capsule 50,000 Units  50,000 Units Oral every Tuesday    fluticasone (FLONASE) 50 mcg/actuation nasal spray 2 Spray  2 Spray Both Nostrils DAILY PRN    hydrOXYzine HCl (ATARAX) tablet 25 mg  25 mg Oral TID PRN    isosorbide mononitrate ER (IMDUR) tablet 120 mg  120 mg Oral DAILY    pantoprazole (PROTONIX) tablet 40 mg  40 mg Oral ACB    polyethylene glycol (MIRALAX) packet 17 g  17 g Oral DAILY    senna-docusate (PERICOLACE) 8.6-50 mg per tablet 1 Tab  1 Tab Oral DAILY    sodium chloride (NS) flush 5-10 mL  5-10 mL IntraVENous Q8H    sodium chloride (NS) flush 5-10 mL  5-10 mL IntraVENous PRN    naloxone (NARCAN) injection 0.4 mg  0.4 mg IntraVENous PRN    glucose chewable tablet 16 g  4 Tab Oral PRN    dextrose (D50W) injection syrg 12.5-25 g  12.5-25 g IntraVENous PRN    glucagon (GLUCAGEN) injection 1 mg  1 mg IntraMUSCular PRN    acetaminophen (TYLENOL) tablet 650 mg  650 mg Oral Q4H PRN    diphenhydrAMINE (BENADRYL) capsule 25 mg  25 mg Oral Q4H PRN    insulin lispro (HUMALOG) injection   SubCUTAneous AC&HS          Objective:     Vitals:  Blood pressure 140/68, pulse 83, temperature 99.6 °F (37.6 °C), resp. rate 15, height 5' 10\" (1.778 m), weight 149 kg (328 lb 7.8 oz), SpO2 100 %.   Temp (24hrs), Av.5 °F (36.9 °C), Min:97.6 °F (36.4 °C), Max:99.6 °F (37.6 °C)      Intake and Output:      1901 -  0700  In: 880 [P.O.:580; I.V.:300]  Out: 1200 [Urine:1200]    Physical Exam: GENERAL ASSESSMENT: NAD  CHEST: CTA  HEART: S1S2  ABDOMEN: Soft,NT  SKIN DRY  JT: No acute findings  EXTREMITY: trace EDEMA          Data Review      No results for input(s): TNIPOC in the last 72 hours. No lab exists for component: ITNL   No results for input(s): CPK, CKMB, TROIQ in the last 72 hours. Recent Labs      06/08/18   0105  06/07/18   0530  06/06/18   0021   NA  140  137  138   K  4.6  4.5  4.4   CL  105  106  104   CO2  25  23  24   BUN  82*  84*  72*   CREA  3.99*  4.07*  4.13*   GLU  157*  147*  297*   PHOS  4.8*  4.8*   --    MG  2.0  2.0  1.9   CA  9.2  8.9  9.2   WBC  10.5  11.0  11.2*  5.7   HGB  8.7*  8.3*  8.4*  8.4*   HCT  27.6*  26.9*  26.7*  26.1*   PLT  166  157  152  146*      Recent Labs      06/08/18   0105  06/07/18   0530  06/06/18   0021   INR  1.5*  1.5*  1.3*   PTP  15.6*  15.3*  13.5*     Needs: urine analysis, urine sodium, protein and creatinine  Lab Results   Component Value Date/Time    Sodium urine, random 25 11/10/2014 12:40 PM    Creatinine, urine 145.29 03/04/2017 05:01 AM         : Mer Barrett MD  6/8/2018        [facility]:  [email or web address]  Trudy Human office:  [address] [place] [address], [address] [place] [address] [address]  [place], [address] [facility]  Phone: [phone]  Fax :     [phone]    [facility] office:  [address] [facility], [address]  Phone - [phone]  Fax - [phone]

## 2018-06-08 NOTE — CONSULTS
Name: Haylie John: [address]   : 1955 Admit Date: 2018   Phone: [phone]  Room: [facility]/01   PCP: Lou Owens DO  MRN: [id number]   Date: 2018  Code: Full Code          Chart and notes reviewed. Data reviewed. I review the patient's current medications in the medical record at each encounter. I have evaluated and examined the patient. HPI:    9:59 AM       History was obtained from patient and chart. I was asked by Ketty Kumar MD to see Ashlyn Feliciano in consultation for a chief complaint of abnormal chest CT, persistent wheeze, and cough. Mrs. Sha Alcantara is a pleasant 63year old female well known to our group with history of chronic respiratory failure, smoking related ILD (via open lung biopsy in ) with respiratory bronchiolitis, JASEN, diastolic heart failure class III, pulm HTN, CAD with 1 vessel disease/RCA , chronic severe anemia (refractory to Procrit), chronic anticoagulation for DVT, HTN, and CKD who presented to the [facility] ED sent by nephrology for chest pain. Had right heart cath via right femoral vein which revealed Moderate-severe PA HTN, post capillary and marked elevation in biV filling pressures. Cardiology attempted right radial access, but unable to advance wire and did not pursue further attempts at [facility] due to tenuous respiratory status. Patient reports continued dyspnea, not significantly improved since admission despite aggressive diuresis. Also reports persistent cough that is mostly nonproductive. Reports chills, but denies fever. Continues with anterior CP, mostly unchanged since admission. Denies abd pain, but reports nausea. Reports continued LE/pedal edema. Chest CT is personally visualized and compared to prior images from 2017.   There are new groundglass areas of opacity and mild consolidation in the right upper lobe and lingula most consistent with superimposed pneumonia or alveolitis on chronic interstitial lung disease. Interstitial lung disease overall has a similar appearance of slight increased reticulations in the right upper lobe. Fibrosis and honeycombing in the lower lobes right greater than left is unchanged.      WBC 10.5  Hgb 8.7  Creat 3.99  Lactic acid 0.6  RVP in process  Strep pneumo in process  Bone marrow biopsy this admission normal with no abnormal cells or dysplasia per Hematology      Past Medical History:   Diagnosis Date     Sleep Apnea 2/16/2011    Aortic aneurysm ([facility])     CAD (coronary Artery Disease) Native Artery 2/16/2011    CKD (chronic kidney disease) stage 4, GFR 15-29 ml/min ([facility]) 2/10/2017    COPD (chronic obstructive pulmonary disease) ([facility])     Diabetic gastroparesis ([facility])     Diastolic heart failure ([address] 10/5/2012    DM type 2 causing neurological disease ([address]     DM type 2 causing renal disease ([address]     DM type 2 causing vascular disease ([address]     Esophageal stricture 2012    dialted Dr. Yanick Toledo G6PD deficiency ([address]     trait    GERD (gastroesophageal reflux disease)     Gout     ILD (interstitial lung disease) ([address]     open lung bx CJW 2010    Morbid obesity ([address]     OA (osteoarthritis)     Ovarian cancer ([address]     cervical and uterine    Rheumatoid arteritis     S/P left pulmonary artery pressure sensor implant placement 8/31/2017    [employer]     Tobacco use disorder 3/21/2012    Uterine cervix cancer ([address]     Vitamin D deficiency 8/9/2013       Past Surgical History:   Procedure Laterality Date    CARDIAC SURG PROCEDURE UNLIST      stents    COLONOSCOPY N/A 6/24/2016    COLONOSCOPY performed by Ernesto Loja MD at [facility] HX APPENDECTOMY      HX CARPAL TUNNEL RELEASE      bilateral    HX CHOLECYSTECTOMY      HX HERNIA REPAIR      HX HYSTERECTOMY      HX ORTHOPAEDIC  11/12/12    right knee replacement    HX TONSIL AND ADENOIDECTOMY      UPPER GI ENDOSCOPY,DILATN W GUIDE  6/24/2016 Family History   Problem Relation Age of Onset    Heart Disease Mother     Heart Disease Brother        Social History   Substance Use Topics    Smoking status: Former Smoker     Packs/day: 0.50     Years: 41.00     Types: Cigarettes     Quit date: 11/9/2014    Smokeless tobacco: Never Used    Alcohol use No       Allergies   Allergen Reactions    Contrast Dye [Iodine] Anaphylaxis     Tolerates when pre medicated w/ IV solumedrol and benadryl prior to procedure     Levaquin [Levofloxacin] Nausea and Vomiting    Morphine Hives and Itching       Current Facility-Administered Medications   Medication Dose Route Frequency    hydrALAZINE (APRESOLINE) tablet 100 mg  100 mg Oral TID    metOLazone (ZAROXOLYN) tablet 5 mg  5 mg Oral DAILY    epoetin charlie (EPOGEN;PROCRIT) injection 10,000 Units  10,000 Units SubCUTAneous Once per day on Thu Sat    ondansetron (ZOFRAN ODT) tablet 8 mg  8 mg Oral Q8H PRN    magnesium oxide (MAG-OX) tablet 400 mg  400 mg Oral BID    lidocaine (XYLOCAINE) 10 mg/mL (1 %) injection 10-20 mL  10-20 mL IntraDERMal Multiple    sodium chloride (NS) flush 5-10 mL  5-10 mL IntraVENous Q8H    sodium chloride (NS) flush 5-10 mL  5-10 mL IntraVENous PRN    bumetanide (BUMEX) injection 2 mg  2 mg IntraVENous BID    sodium chloride (OCEAN) 0.65 % nasal squeeze bottle 2 Spray  2 Spray Both Nostrils Q2H PRN    HYDROmorphone (DILAUDID) injection 0.5 mg  0.5 mg IntraVENous Q4H PRN    0.9% sodium chloride infusion 250 mL  250 mL IntraVENous PRN    nitroglycerin (NITROSTAT) tablet 0.4 mg  0.4 mg SubLINGual PRN    oxyCODONE-acetaminophen (PERCOCET 7.5) 7.5-325 mg per tablet 1 Tab  1 Tab Oral Q6H PRN    Warfarin: pharmacy to dose    Other Rx Dosing/Monitoring    amLODIPine (NORVASC) tablet 10 mg  10 mg Oral DAILY    albuterol (PROVENTIL VENTOLIN) nebulizer solution 2.5 mg  2.5 mg Nebulization Q6H PRN    allopurinol (ZYLOPRIM) tablet 100 mg  100 mg Oral DAILY    aspirin delayed-release tablet 81 mg  81 mg Oral DAILY    atorvastatin (LIPITOR) tablet 80 mg  80 mg Oral QHS    calcitRIOL (ROCALTROL) capsule 0.25 mcg  0.25 mcg Oral Q M, W, F & SAT    carvedilol (COREG) tablet 25 mg  25 mg Oral BID WITH MEALS    ergocalciferol (ERGOCALCIFEROL) capsule 50,000 Units  50,000 Units Oral every Tuesday    fluticasone (FLONASE) 50 mcg/actuation nasal spray 2 Spray  2 Spray Both Nostrils DAILY PRN    hydrOXYzine HCl (ATARAX) tablet 25 mg  25 mg Oral TID PRN    isosorbide mononitrate ER (IMDUR) tablet 120 mg  120 mg Oral DAILY    pantoprazole (PROTONIX) tablet 40 mg  40 mg Oral ACB    polyethylene glycol (MIRALAX) packet 17 g  17 g Oral DAILY    senna-docusate (PERICOLACE) 8.6-50 mg per tablet 1 Tab  1 Tab Oral DAILY    sodium chloride (NS) flush 5-10 mL  5-10 mL IntraVENous Q8H    sodium chloride (NS) flush 5-10 mL  5-10 mL IntraVENous PRN    naloxone (NARCAN) injection 0.4 mg  0.4 mg IntraVENous PRN    glucose chewable tablet 16 g  4 Tab Oral PRN    dextrose (D50W) injection syrg 12.5-25 g  12.5-25 g IntraVENous PRN    glucagon (GLUCAGEN) injection 1 mg  1 mg IntraMUSCular PRN    acetaminophen (TYLENOL) tablet 650 mg  650 mg Oral Q4H PRN    diphenhydrAMINE (BENADRYL) capsule 25 mg  25 mg Oral Q4H PRN    insulin lispro (HUMALOG) injection   SubCUTAneous AC&HS         REVIEW OF SYSTEMS   12 point ROS negative except as stated in the HPI. Physical Exam:   Visit Vitals    /73 (BP 1 Location: Right arm, BP Patient Position: At rest)    Pulse 76    Temp 99.6 °F (37.6 °C)    Resp 18    Ht 5' 10\" (1.778 m)    Wt 149 kg (328 lb 7.8 oz)    SpO2 100%    BMI 47.13 kg/m2       General:  Alert, cooperative, ill appearing, appears stated age. Head:  Normocephalic, without obvious abnormality, atraumatic. Eyes:  Conjunctivae/corneas clear. Nose: Nares normal. Septum midline.  Mucosa normal.    Throat: Lips, mucosa, and tongue normal.    Neck: Supple, symmetrical, trachea midline, no adenopathy. Lungs:   Diminished with trace wheeze and occasional rhonchi bilaterally. Chest wall:  No tenderness or deformity. Heart:  Regular rate and rhythm, S1, S2 normal, no murmur, click, rub or gallop. Abdomen:   Soft, non-tender. Bowel sounds normal. No masses,  No organomegaly. Extremities: Extremities normal, atraumatic, no cyanosis, 1-2+ peripheral edema. Pulses: 2+ and symmetric all extremities. Skin: Skin color, texture, turgor normal. No rashes or lesions   Lymph nodes: Cervical, supraclavicular nodes normal.   Neurologic: Grossly nonfocal       Lab Results   Component Value Date/Time    Sodium 140 06/08/2018 01:05 AM    Potassium 4.6 06/08/2018 01:05 AM    Chloride 105 06/08/2018 01:05 AM    CO2 25 06/08/2018 01:05 AM    BUN 82 (H) 06/08/2018 01:05 AM    Creatinine 3.99 (H) 06/08/2018 01:05 AM    Glucose 157 (H) 06/08/2018 01:05 AM    Calcium 9.2 06/08/2018 01:05 AM    Magnesium 2.0 06/08/2018 01:05 AM    Phosphorus 4.8 (H) 06/08/2018 01:05 AM    Lactic acid 0.6 06/08/2018 01:05 AM       Lab Results   Component Value Date/Time    WBC 10.5 06/08/2018 01:05 AM    HGB 8.7 (L) 06/08/2018 01:05 AM    PLATELET 290 53/17/1549 01:05 AM    MCV 94.8 06/08/2018 01:05 AM       Lab Results   Component Value Date/Time    INR 1.5 (H) 06/08/2018 01:05 AM    aPTT 50.6 (H) 06/05/2018 03:09 AM    AST (SGOT) 24 11/21/2017 12:40 PM    Alk.  phosphatase 94 11/21/2017 12:40 PM    Protein, total 6.8 11/21/2017 12:40 PM    Albumin 3.2 (L) 11/21/2017 12:40 PM    Globulin 3.6 11/21/2017 12:40 PM       Lab Results   Component Value Date/Time    Iron 55 05/31/2018 02:08 PM    TIBC 201 (L) 05/31/2018 02:08 PM    Iron % saturation 27 05/31/2018 02:08 PM    Ferritin 536 (H) 05/31/2018 02:08 PM       No results found for: SR, CRP, GLENNY, ANAIGG, RA, RPR, RPRT, VDRLT, VDRLS, TSH, TSHEXT     No results found for: PH, PHI, PCO2, PCO2I, PO2, PO2I, HCO3, HCO3I, FIO2, FIO2I    Lab Results   Component Value Date/Time    CK 43 03/03/2017 02:20 PM    CK-MB Index Cannot be calulated 03/03/2017 02:20 PM    Troponin-I, Qt. <0.04 05/31/2018 01:51 AM        Lab Results   Component Value Date/Time    Culture result: NO GROWTH 6 DAYS 02/28/2017 04:26 PM    Culture result: HEAVY  NORMAL RESPIRATORY WAGNER   02/03/2017 04:10 PM    Culture result: MODERATE  CANDIDA TROPICALIS   (A) 02/03/2017 04:10 PM    Culture result:  02/03/2017 04:10 PM     CULTURE WILL BE HELD FOR 4 WEEKS. IF THERE IS ADDITIONAL FUNGAL GROWTH, A NEW REPORT WILL FOLLOW. Lab Results   Component Value Date/Time    Hepatitis B surface Ag 0.13 03/02/2017 05:01 PM       Lab Results   Component Value Date/Time    CK 43 03/03/2017 02:20 PM    CK 81 02/28/2017 12:55 PM       Lab Results   Component Value Date/Time    Color YELLOW/STRAW 03/03/2017 12:17 AM    Appearance CLEAR 03/03/2017 12:17 AM    pH (UA) 5.0 03/03/2017 12:17 AM    Protein 30 (A) 03/03/2017 12:17 AM    Glucose >1000 (A) 03/03/2017 12:17 AM    Ketone NEGATIVE  03/03/2017 12:17 AM    Bilirubin NEGATIVE  03/03/2017 12:17 AM    Blood NEGATIVE  03/03/2017 12:17 AM    Urobilinogen 0.2 03/03/2017 12:17 AM    Nitrites NEGATIVE  03/03/2017 12:17 AM    Leukocyte Esterase TRACE (A) 03/03/2017 12:17 AM    WBC 0-4 03/03/2017 12:17 AM    RBC 0-5 03/03/2017 12:17 AM    Bacteria 1+ (A) 03/03/2017 12:17 AM       IMPRESSION  · Chronic hypoxic respiratory failure  · Abnormal chest CT: RUL findings slightly more prominent and consolidated and could represent new infection. Remained of her scan is essentially unchanged from prior images.   · Hx of smoking related ILD (via open lung biopsy in 2010) with respiratory bronchiolitis  · JASEN  · Diastolic heart failure class III  · Pulm HTN  · CAD with 1 vessel disease/RCA   · Chronic severe anemia (refractory to Procrit)  · Chronic anticoagulation for DVT  · HTN  · CKD   · Obesity    PLAN  · Continue O2 as needed to keep sats > 90%; on 2L at baseline  · Continue diuresis with Bumex and metolazone per Renal  · Place on trial of prednisone for cough and mild wheeze   · Continue prn albuterol nebs  · Place on short course of Doxy for 5 days. F/U RVP and pna work up  · Cardiology following as well  · GI prophylaxis: Protonix  · DVT prophylaxis: Coumadin pharmacy to dose      Thank you for allowing us to participate in the care of this patient.       Lorene Cruz

## 2018-06-08 NOTE — PROGRESS NOTES
Bedside and Verbal shift change report given to Dara (oncoming nurse) by Mike Zhang (offgoing nurse). Report included the following information SBAR, Kardex, Procedure Summary, Intake/Output, MAR, Recent Results and Cardiac Rhythm nsr. Pt had CP overnight, unrelieved by nitro tab, relieved by dilaudid. 0740 order for CT chest, viral panel, lab c-reactive protein, UR pneumo AG. Antibiotics dcd. 5749 pt off floor to CT, tele notified. Pt was NOT assessed by me. Contact isolation initiated pending viral panel results. 2289 per Chalino Gaspar with PICC team, pt not candidate due to BUN/Creat. Dr Roxie Nichols notified.     4990 pt on floor, tele notified    6468 Dr Roxie Nichols at bedside, made aware that CT has been done but not read. Verbal order to advance diet as tolerated. 1013 order for pulmonary consult, hydralazine amended to 100 mg TID    [address] at bedside    1053 pt requesting prn neb, RT notified    1220 order to start doxycyclene 100 mg q12, prednisone 40mg. 1306 pt viral panel positive for parainfluenza 3, placed on droplet and contact precautions. Sent messages to Dr Kayla Schwartz and infection control. 1315 Dr Caprice Rebolledo at bedside    1340 prn dilaudid for c/o back pain    1430 nebs changed to q8    Bedside and Verbal shift change report given to [facility] (oncoming nurse) by Dara (offgoing nurse). Report included the following information SBAR, Kardex, ED Summary, Procedure Summary, Intake/Output, MAR, Recent Results and Cardiac Rhythm nsr.

## 2018-06-09 LAB
ALBUMIN SERPL-MCNC: 3.3 G/DL (ref 3.5–5)
ANION GAP SERPL CALC-SCNC: 9 MMOL/L (ref 5–15)
BUN SERPL-MCNC: 84 MG/DL (ref 6–20)
BUN/CREAT SERPL: 20 (ref 12–20)
CALCIUM SERPL-MCNC: 9.3 MG/DL (ref 8.5–10.1)
CHLORIDE SERPL-SCNC: 102 MMOL/L (ref 97–108)
CO2 SERPL-SCNC: 25 MMOL/L (ref 21–32)
CREAT SERPL-MCNC: 4.22 MG/DL (ref 0.55–1.02)
FLUID CULTURE, SPNG2: NORMAL
GLUCOSE BLD STRIP.AUTO-MCNC: 158 MG/DL (ref 65–100)
GLUCOSE BLD STRIP.AUTO-MCNC: 205 MG/DL (ref 65–100)
GLUCOSE BLD STRIP.AUTO-MCNC: 234 MG/DL (ref 65–100)
GLUCOSE BLD STRIP.AUTO-MCNC: 332 MG/DL (ref 65–100)
GLUCOSE SERPL-MCNC: 200 MG/DL (ref 65–100)
INR PPP: 2.1 (ref 0.9–1.1)
ORGANISM ID, SPNG3: NORMAL
PHOSPHATE SERPL-MCNC: 4.7 MG/DL (ref 2.6–4.7)
PLEASE NOTE, SPNG4: NORMAL
POTASSIUM SERPL-SCNC: 4.5 MMOL/L (ref 3.5–5.1)
PROTHROMBIN TIME: 21 SEC (ref 9–11.1)
S PNEUM AG SPEC QL LA: NEGATIVE
SERVICE CMNT-IMP: ABNORMAL
SODIUM SERPL-SCNC: 136 MMOL/L (ref 136–145)
SPECIMEN SOURCE: NORMAL
SPECIMEN, SPNG1: NORMAL

## 2018-06-09 PROCEDURE — 74011250637 HC RX REV CODE- 250/637: Performed by: INTERNAL MEDICINE

## 2018-06-09 PROCEDURE — 74011636637 HC RX REV CODE- 636/637: Performed by: INTERNAL MEDICINE

## 2018-06-09 PROCEDURE — 74011250636 HC RX REV CODE- 250/636: Performed by: INTERNAL MEDICINE

## 2018-06-09 PROCEDURE — 74011000250 HC RX REV CODE- 250: Performed by: INTERNAL MEDICINE

## 2018-06-09 PROCEDURE — 65660000000 HC RM CCU STEPDOWN

## 2018-06-09 PROCEDURE — 80069 RENAL FUNCTION PANEL: CPT | Performed by: INTERNAL MEDICINE

## 2018-06-09 PROCEDURE — 94660 CPAP INITIATION&MGMT: CPT

## 2018-06-09 PROCEDURE — 36415 COLL VENOUS BLD VENIPUNCTURE: CPT | Performed by: NURSE PRACTITIONER

## 2018-06-09 PROCEDURE — 74011636637 HC RX REV CODE- 636/637: Performed by: PHYSICIAN ASSISTANT

## 2018-06-09 PROCEDURE — 85610 PROTHROMBIN TIME: CPT | Performed by: NURSE PRACTITIONER

## 2018-06-09 PROCEDURE — 74011250637 HC RX REV CODE- 250/637: Performed by: PHYSICIAN ASSISTANT

## 2018-06-09 PROCEDURE — 77010033678 HC OXYGEN DAILY

## 2018-06-09 PROCEDURE — 74011250637 HC RX REV CODE- 250/637: Performed by: NURSE PRACTITIONER

## 2018-06-09 PROCEDURE — 82962 GLUCOSE BLOOD TEST: CPT

## 2018-06-09 PROCEDURE — 74011000250 HC RX REV CODE- 250: Performed by: NURSE PRACTITIONER

## 2018-06-09 PROCEDURE — 94640 AIRWAY INHALATION TREATMENT: CPT

## 2018-06-09 RX ORDER — IPRATROPIUM BROMIDE AND ALBUTEROL SULFATE 2.5; .5 MG/3ML; MG/3ML
3 SOLUTION RESPIRATORY (INHALATION)
Status: DISCONTINUED | OUTPATIENT
Start: 2018-06-09 | End: 2018-06-15 | Stop reason: HOSPADM

## 2018-06-09 RX ORDER — LABETALOL HYDROCHLORIDE 5 MG/ML
20 INJECTION, SOLUTION INTRAVENOUS
Status: DISCONTINUED | OUTPATIENT
Start: 2018-06-09 | End: 2018-06-15 | Stop reason: HOSPADM

## 2018-06-09 RX ADMIN — HYDRALAZINE HYDROCHLORIDE 100 MG: 25 TABLET ORAL at 22:17

## 2018-06-09 RX ADMIN — PREDNISONE 40 MG: 20 TABLET ORAL at 08:33

## 2018-06-09 RX ADMIN — DOXYCYCLINE HYCLATE 100 MG: 100 TABLET, COATED ORAL at 09:27

## 2018-06-09 RX ADMIN — CALCITRIOL 0.25 MCG: 0.25 CAPSULE, LIQUID FILLED ORAL at 08:32

## 2018-06-09 RX ADMIN — INSULIN LISPRO 3 UNITS: 100 INJECTION, SOLUTION INTRAVENOUS; SUBCUTANEOUS at 08:33

## 2018-06-09 RX ADMIN — IPRATROPIUM BROMIDE AND ALBUTEROL SULFATE 3 ML: .5; 3 SOLUTION RESPIRATORY (INHALATION) at 23:12

## 2018-06-09 RX ADMIN — BUMETANIDE 2 MG: 0.25 INJECTION INTRAMUSCULAR; INTRAVENOUS at 17:47

## 2018-06-09 RX ADMIN — Medication 10 ML: at 04:43

## 2018-06-09 RX ADMIN — ALLOPURINOL 100 MG: 100 TABLET ORAL at 09:27

## 2018-06-09 RX ADMIN — Medication 10 ML: at 04:44

## 2018-06-09 RX ADMIN — Medication 400 MG: at 17:48

## 2018-06-09 RX ADMIN — ONDANSETRON 8 MG: 4 TABLET, ORALLY DISINTEGRATING ORAL at 20:38

## 2018-06-09 RX ADMIN — HYDROMORPHONE HYDROCHLORIDE 0.5 MG: 1 INJECTION, SOLUTION INTRAMUSCULAR; INTRAVENOUS; SUBCUTANEOUS at 20:39

## 2018-06-09 RX ADMIN — HYDRALAZINE HYDROCHLORIDE 100 MG: 25 TABLET ORAL at 08:32

## 2018-06-09 RX ADMIN — CARVEDILOL 25 MG: 12.5 TABLET, FILM COATED ORAL at 17:48

## 2018-06-09 RX ADMIN — ASPIRIN 81 MG: 81 TABLET, COATED ORAL at 08:33

## 2018-06-09 RX ADMIN — IPRATROPIUM BROMIDE AND ALBUTEROL SULFATE 3 ML: .5; 3 SOLUTION RESPIRATORY (INHALATION) at 15:28

## 2018-06-09 RX ADMIN — STANDARDIZED SENNA CONCENTRATE AND DOCUSATE SODIUM 1 TABLET: 8.6; 5 TABLET, FILM COATED ORAL at 08:32

## 2018-06-09 RX ADMIN — AMLODIPINE BESYLATE 10 MG: 5 TABLET ORAL at 08:32

## 2018-06-09 RX ADMIN — INSULIN LISPRO 4 UNITS: 100 INJECTION, SOLUTION INTRAVENOUS; SUBCUTANEOUS at 12:14

## 2018-06-09 RX ADMIN — METOLAZONE 5 MG: 5 TABLET ORAL at 08:32

## 2018-06-09 RX ADMIN — IPRATROPIUM BROMIDE 1 DOSE: 0.5 SOLUTION RESPIRATORY (INHALATION) at 07:45

## 2018-06-09 RX ADMIN — WARFARIN SODIUM 3 MG: 2 TABLET ORAL at 17:48

## 2018-06-09 RX ADMIN — ISOSORBIDE MONONITRATE 120 MG: 60 TABLET ORAL at 08:32

## 2018-06-09 RX ADMIN — ATORVASTATIN CALCIUM 80 MG: 20 TABLET, FILM COATED ORAL at 22:16

## 2018-06-09 RX ADMIN — INSULIN LISPRO 4 UNITS: 100 INJECTION, SOLUTION INTRAVENOUS; SUBCUTANEOUS at 17:47

## 2018-06-09 RX ADMIN — Medication 10 ML: at 17:49

## 2018-06-09 RX ADMIN — Medication 10 ML: at 22:20

## 2018-06-09 RX ADMIN — DOXYCYCLINE HYCLATE 100 MG: 100 TABLET, COATED ORAL at 22:17

## 2018-06-09 RX ADMIN — HYDROMORPHONE HYDROCHLORIDE 0.5 MG: 1 INJECTION, SOLUTION INTRAMUSCULAR; INTRAVENOUS; SUBCUTANEOUS at 04:36

## 2018-06-09 RX ADMIN — Medication 10 ML: at 00:41

## 2018-06-09 RX ADMIN — BUMETANIDE 2 MG: 0.25 INJECTION INTRAMUSCULAR; INTRAVENOUS at 08:34

## 2018-06-09 RX ADMIN — ERYTHROPOIETIN 10000 UNITS: 10000 INJECTION, SOLUTION INTRAVENOUS; SUBCUTANEOUS at 18:26

## 2018-06-09 RX ADMIN — Medication 400 MG: at 08:35

## 2018-06-09 RX ADMIN — LABETALOL HYDROCHLORIDE 20 MG: 5 INJECTION, SOLUTION INTRAVENOUS at 23:52

## 2018-06-09 RX ADMIN — POLYETHYLENE GLYCOL (3350) 17 G: 17 POWDER, FOR SOLUTION ORAL at 08:33

## 2018-06-09 RX ADMIN — CARVEDILOL 25 MG: 12.5 TABLET, FILM COATED ORAL at 08:32

## 2018-06-09 RX ADMIN — HYDRALAZINE HYDROCHLORIDE 100 MG: 25 TABLET ORAL at 17:48

## 2018-06-09 RX ADMIN — PANTOPRAZOLE SODIUM 40 MG: 40 TABLET, DELAYED RELEASE ORAL at 08:34

## 2018-06-09 RX ADMIN — INSULIN LISPRO 5 UNITS: 100 INJECTION, SOLUTION INTRAVENOUS; SUBCUTANEOUS at 22:17

## 2018-06-09 RX ADMIN — HYDROMORPHONE HYDROCHLORIDE 0.5 MG: 1 INJECTION, SOLUTION INTRAMUSCULAR; INTRAVENOUS; SUBCUTANEOUS at 00:42

## 2018-06-09 NOTE — PROGRESS NOTES
[address] [facility] [place]  YOB: 1955          Assessment & Plan:   CKD 4  · Cr touch high at 4.2 mg due to high doses of combination diuretics  · She has high filling pressures and needs diuresis  · No HD yet  · Cont IV Loops + metolazone today  · Daily wts: 147 from 149 from 151   · Has AVF which is ready for use    CP  · On oxygen  · Cath 6 5 18:rev    HTN   · 140-160S:Amlodipine,Corge,Loops,Hydralazine    SHPT   · Calcitriol    Anemia of CKD   · Resume 2 times a week EPO here, we will debate changing Epo to Aranesp out pt  · S/p BMB  : normal       Subjective:   CC: f/u CKD  HPI:   CKD: cr slightly high, nausea +, no uremia  Wt down again  HTN  Is acceptale  Fair urine.   Anemia severe and on BIW Epo  Wt better  ROS: no n/v/ neg for 10 sys except in HPI  Current Facility-Administered Medications   Medication Dose Route Frequency    albuterol-ipratropium (DUO-NEB) 2.5 MG-0.5 MG/3 ML  3 mL Nebulization Q8H RT    hydrALAZINE (APRESOLINE) tablet 100 mg  100 mg Oral TID    doxycycline (VIBRA-TABS) tablet 100 mg  100 mg Oral Q12H    predniSONE (DELTASONE) tablet 40 mg  40 mg Oral DAILY WITH BREAKFAST    sodium chloride (OCEAN) 0.65 % nasal squeeze bottle 2 Spray  2 Spray Both Nostrils Q2H PRN    HYDROmorphone (DILAUDID) syringe 0.5 mg  0.5 mg IntraVENous Q4H PRN    metOLazone (ZAROXOLYN) tablet 5 mg  5 mg Oral DAILY    epoetin charlie (EPOGEN;PROCRIT) injection 10,000 Units  10,000 Units SubCUTAneous Once per day on Thu Sat    ondansetron (ZOFRAN ODT) tablet 8 mg  8 mg Oral Q8H PRN    magnesium oxide (MAG-OX) tablet 400 mg  400 mg Oral BID    lidocaine (XYLOCAINE) 10 mg/mL (1 %) injection 10-20 mL  10-20 mL IntraDERMal Multiple    sodium chloride (NS) flush 5-10 mL  5-10 mL IntraVENous Q8H    sodium chloride (NS) flush 5-10 mL  5-10 mL IntraVENous PRN    bumetanide (BUMEX) injection 2 mg  2 mg IntraVENous BID    sodium chloride (OCEAN) 0.65 % nasal squeeze bottle 2 Spray  2 Spray Both Nostrils Q2H PRN    0.9% sodium chloride infusion 250 mL  250 mL IntraVENous PRN    nitroglycerin (NITROSTAT) tablet 0.4 mg  0.4 mg SubLINGual PRN    oxyCODONE-acetaminophen (PERCOCET 7.5) 7.5-325 mg per tablet 1 Tab  1 Tab Oral Q6H PRN    Warfarin: pharmacy to dose    Other Rx Dosing/Monitoring    amLODIPine (NORVASC) tablet 10 mg  10 mg Oral DAILY    albuterol (PROVENTIL VENTOLIN) nebulizer solution 2.5 mg  2.5 mg Nebulization Q6H PRN    allopurinol (ZYLOPRIM) tablet 100 mg  100 mg Oral DAILY    aspirin delayed-release tablet 81 mg  81 mg Oral DAILY    atorvastatin (LIPITOR) tablet 80 mg  80 mg Oral QHS    calcitRIOL (ROCALTROL) capsule 0.25 mcg  0.25 mcg Oral Q M, W, F & SAT    carvedilol (COREG) tablet 25 mg  25 mg Oral BID WITH MEALS    ergocalciferol (ERGOCALCIFEROL) capsule 50,000 Units  50,000 Units Oral every Tuesday    fluticasone (FLONASE) 50 mcg/actuation nasal spray 2 Spray  2 Spray Both Nostrils DAILY PRN    hydrOXYzine HCl (ATARAX) tablet 25 mg  25 mg Oral TID PRN    isosorbide mononitrate ER (IMDUR) tablet 120 mg  120 mg Oral DAILY    pantoprazole (PROTONIX) tablet 40 mg  40 mg Oral ACB    polyethylene glycol (MIRALAX) packet 17 g  17 g Oral DAILY    senna-docusate (PERICOLACE) 8.6-50 mg per tablet 1 Tab  1 Tab Oral DAILY    sodium chloride (NS) flush 5-10 mL  5-10 mL IntraVENous Q8H    sodium chloride (NS) flush 5-10 mL  5-10 mL IntraVENous PRN    naloxone (NARCAN) injection 0.4 mg  0.4 mg IntraVENous PRN    glucose chewable tablet 16 g  4 Tab Oral PRN    dextrose (D50W) injection syrg 12.5-25 g  12.5-25 g IntraVENous PRN    glucagon (GLUCAGEN) injection 1 mg  1 mg IntraMUSCular PRN    acetaminophen (TYLENOL) tablet 650 mg  650 mg Oral Q4H PRN    diphenhydrAMINE (BENADRYL) capsule 25 mg  25 mg Oral Q4H PRN    insulin lispro (HUMALOG) injection   SubCUTAneous AC&HS          Objective:     Vitals:  Blood pressure 150/75, pulse 72, temperature 98 °F (36.7 °C), resp. rate 12, height 5' 10\" (1.778 m), weight 147.5 kg (325 lb 2.9 oz), SpO2 96 %. Temp (24hrs), Av.5 °F (36.9 °C), Min:98 °F (36.7 °C), Max:99 °F (37.2 °C)      Intake and Output:      1901 -  0700  In: 2460 [P.O.:2160; I.V.:300]  Out: 5650 [Urine:5650]    Physical Exam:               GENERAL ASSESSMENT: NAD  CHEST: CTA  HEART: S1S2  ABDOMEN: Soft,NT  SKIN DRY  JT: No acute findings  EXTREMITY: trace EDEMA          Data Review      No results for input(s): TNIPOC in the last 72 hours. No lab exists for component: ITNL   No results for input(s): CPK, CKMB, TROIQ in the last 72 hours. Recent Labs      18   0840  18   0105  18   0530   NA  136  140  137   K  4.5  4.6  4.5   CL  102  105  106   CO2  25  25  23   BUN  84*  82*  84*   CREA  4.22*  3.99*  4.07*   GLU  200*  157*  147*   PHOS  4.7  4.8*  4.8*   MG   --   2.0  2.0   CA  9.3  9.2  8.9   ALB  3.3*   --    --    WBC   --   10.5  11.0  11.2*   HGB   --   8.7*  8.3*  8.4*   HCT   --   27.6*  26.9*  26.7*   PLT   --   166  157  152      Recent Labs      18   0452  18   0105  18   0530   INR  2.1*  1.5*  1.5*   PTP  21.0*  15.6*  15.3*     Needs: urine analysis, urine sodium, protein and creatinine  Lab Results   Component Value Date/Time    Sodium urine, random 25 11/10/2014 12:40 PM    Creatinine, urine 145.29 2017 05:01 AM         : Jovita Harmon MD  2018        [facility]:  [email or web address]  [facility] office:  [address] [place] [address], [address] [place] [address],[address]  [place], [address] [facility]  Phone: [phone]  Fax :     [phone]    [place] office:  [address] [facility], [address]  Phone - [phone]  Fax - [phone]

## 2018-06-09 NOTE — PROGRESS NOTES
[facility] Dosing Services: 6/9/18  Consult for Warfarin Dosing by Pharmacy by JUANCHO Nathan NP  Consult provided for this 63 y.o. female for indication of Venous Thrombosis. Day of Therapy: resumed from home. (PTA: Warfarin 2 mg on Mon/Fri and 3 mg on Tue/Wed/Thur/Sat/Sun)   Dose to achieve an INR goal of 2-3    Order entered for  Warfarin  3 (mg) ordered to be given today at 18:00. Significant drug interactions: Allopurinol  Previous dose given 4 mg   PT/INR Lab Results   Component Value Date/Time    INR 2.1 (H) 06/09/2018 04:52 AM      Platelets Lab Results   Component Value Date/Time    PLATELET 270 40/01/0928 01:05 AM      H/H Lab Results   Component Value Date/Time    HGB 8.7 (L) 06/08/2018 01:05 AM        Pharmacy to follow daily and will provide subsequent Warfarin dosing based on clinical status.   Irena Mercado)  Contact information [phone]

## 2018-06-09 NOTE — PROGRESS NOTES
SUBJECTIVE      Patient with diastolic heart failure, pulmonary hypertension admitted with respiratory failure and chest pain found to have RCA . Yesterday she diuresed -3020 cc net balance however today she has a positive balance of 960 cc. She is currently on Bumex 2 mg IV twice daily as well as oral Zaroxolyn 5 mg daily. INR is now therapeutic at 2.1. Blood pressure is elevated 160/75 today. Hydralazine was increased yesterday. Reports improving SOB though not yet to baseline. No acute events overnight.       ACTIVE PROBLEM LIST     Patient Active Problem List    Diagnosis Date Noted    Hx of deep venous thrombosis 05/30/2018    Diabetic gastroparesis (HCC)     GERD (gastroesophageal reflux disease)     Rheumatoid arteritis     DM type 2 causing neurological disease ([facility]     DM type 2 causing renal disease ([address]     DM type 2 causing vascular disease ([facility]     Gout     COPD [facility]ch[facility] obstructive pulmonary disease) [facility]     Chest pain 11/21/2017    ILD (interstitial lung disease) ([facility] 08/20/2017    Warfarin anticoagulation 08/20/2017    COPD, severe ([address] 06/21/2017    CKD (chronic kidney disease) stage 4, GFR 15-29 ml/min ([address] 02/10/2017    DM (diabetes mellitus), type 2 with renal complications ([address] 44/91/0832    Normocytic anemia 05/26/2013    Chronic diastolic heart failure ([address] 46/47/0904    Diastolic heart failure ([address] 10/05/2012    HTN (hypertension) 10/01/2012    Morbid obesity 10/01/2012    Esophageal reflux 10/01/2012    Gastroparesis 10/01/2012    Pulmonary HTN 03/21/2012    CAD (coronary Artery Disease) Native Artery 02/16/2011    JASEN on CPAP 02/16/2011    Sleep apnea 02/16/2011          MEDICATIONS     Current Facility-Administered Medications   Medication Dose Route Frequency    albuterol-ipratropium (DUO-NEB) 2.5 MG-0.5 MG/3 ML  3 mL Nebulization Q8H RT    hydrALAZINE (APRESOLINE) tablet 100 mg  100 mg Oral TID    doxycycline (VIBRA-TABS) tablet 100 mg  100 mg Oral Q12H    predniSONE (DELTASONE) tablet 40 mg  40 mg Oral DAILY WITH BREAKFAST    sodium chloride (OCEAN) 0.65 % nasal squeeze bottle 2 Spray  2 Spray Both Nostrils Q2H PRN    HYDROmorphone (DILAUDID) syringe 0.5 mg  0.5 mg IntraVENous Q4H PRN    metOLazone (ZAROXOLYN) tablet 5 mg  5 mg Oral DAILY    epoetin charlie (EPOGEN;PROCRIT) injection 10,000 Units  10,000 Units SubCUTAneous Once per day on Thu Sat    ondansetron (ZOFRAN ODT) tablet 8 mg  8 mg Oral Q8H PRN    magnesium oxide (MAG-OX) tablet 400 mg  400 mg Oral BID    lidocaine (XYLOCAINE) 10 mg/mL (1 %) injection 10-20 mL  10-20 mL IntraDERMal Multiple    sodium chloride (NS) flush 5-10 mL  5-10 mL IntraVENous Q8H    sodium chloride (NS) flush 5-10 mL  5-10 mL IntraVENous PRN    bumetanide (BUMEX) injection 2 mg  2 mg IntraVENous BID    sodium chloride (OCEAN) 0.65 % nasal squeeze bottle 2 Spray  2 Spray Both Nostrils Q2H PRN    0.9% sodium chloride infusion 250 mL  250 mL IntraVENous PRN    nitroglycerin (NITROSTAT) tablet 0.4 mg  0.4 mg SubLINGual PRN    oxyCODONE-acetaminophen (PERCOCET 7.5) 7.5-325 mg per tablet 1 Tab  1 Tab Oral Q6H PRN    Warfarin: pharmacy to dose    Other Rx Dosing/Monitoring    amLODIPine (NORVASC) tablet 10 mg  10 mg Oral DAILY    albuterol (PROVENTIL VENTOLIN) nebulizer solution 2.5 mg  2.5 mg Nebulization Q6H PRN    allopurinol (ZYLOPRIM) tablet 100 mg  100 mg Oral DAILY    aspirin delayed-release tablet 81 mg  81 mg Oral DAILY    atorvastatin (LIPITOR) tablet 80 mg  80 mg Oral QHS    calcitRIOL (ROCALTROL) capsule 0.25 mcg  0.25 mcg Oral Q M, W, F & SAT    carvedilol (COREG) tablet 25 mg  25 mg Oral BID WITH MEALS    ergocalciferol (ERGOCALCIFEROL) capsule 50,000 Units  50,000 Units Oral every Tuesday    fluticasone (FLONASE) 50 mcg/actuation nasal spray 2 Spray  2 Spray Both Nostrils DAILY PRN    hydrOXYzine HCl (ATARAX) tablet 25 mg  25 mg Oral TID PRN    isosorbide mononitrate ER (IMDUR) tablet 120 mg  120 mg Oral DAILY    pantoprazole (PROTONIX) tablet 40 mg  40 mg Oral ACB    polyethylene glycol (MIRALAX) packet 17 g  17 g Oral DAILY    senna-docusate (PERICOLACE) 8.6-50 mg per tablet 1 Tab  1 Tab Oral DAILY    sodium chloride (NS) flush 5-10 mL  5-10 mL IntraVENous Q8H    sodium chloride (NS) flush 5-10 mL  5-10 mL IntraVENous PRN    naloxone (NARCAN) injection 0.4 mg  0.4 mg IntraVENous PRN    glucose chewable tablet 16 g  4 Tab Oral PRN    dextrose (D50W) injection syrg 12.5-25 g  12.5-25 g IntraVENous PRN    glucagon (GLUCAGEN) injection 1 mg  1 mg IntraMUSCular PRN    acetaminophen (TYLENOL) tablet 650 mg  650 mg Oral Q4H PRN    diphenhydrAMINE (BENADRYL) capsule 25 mg  25 mg Oral Q4H PRN    insulin lispro (HUMALOG) injection   SubCUTAneous AC&HS          PAST MEDICAL HISTORY     Past Medical History:   Diagnosis Date     Sleep Apnea 2/16/2011    [facility]     CAD (coronary Artery Disease) Native Artery 2/16/2011    CKD (chronic kidney disease) stage 4, GFR 15-29 ml/min ([facility] 2/10/2017    COPD (chronic obstructive pulmonary disease) ([facility]     Diabetic gastroparesis ([facility])     Diastolic heart failure ([address] 10/5/2012    DM type 2 causing neurological disease ([address]     DM type 2 causing renal disease ([address]     DM type 2 causing vascular disease ([address]     Esophageal stricture 2012    dialted Dr. Jaylin Macdonald G6PD deficiency ([address]     trait    GERD (gastroesophageal reflux disease)     Gout     ILD (interstitial lung disease) ([address]     open lung bx CJW 2010    Morbid obesity ([address]     OA (osteoarthritis)     Ovarian cancer ([address]     cervical and uterine    Rheumatoid arteritis     S/P left pulmonary artery pressure sensor implant placement 8/31/2017    [employer]     Tobacco use disorder 3/21/2012    Uterine cervix cancer ([address]     Vitamin D deficiency 8/9/2013           PAST SURGICAL HISTORY     Past Surgical History:   Procedure Laterality Date    CARDIAC SURG PROCEDURE UNLIST      stents    COLONOSCOPY N/A 6/24/2016    COLONOSCOPY performed by Melinda Valerio MD at [facility] HX APPENDECTOMY      HX CARPAL TUNNEL RELEASE      bilateral    HX CHOLECYSTECTOMY      HX HERNIA REPAIR      HX HYSTERECTOMY      HX ORTHOPAEDIC  11/12/12    right knee replacement    HX TONSIL AND ADENOIDECTOMY      UPPER GI ENDOSCOPY,DILATN W GUIDE  6/24/2016               ALLERGIES     Allergies   Allergen Reactions    Contrast Dye [Iodine] Anaphylaxis     Tolerates when pre medicated w/ IV solumedrol and benadryl prior to procedure     Levaquin [Levofloxacin] Nausea and Vomiting    Morphine Hives and Itching          FAMILY HISTORY     Family History   Problem Relation Age of Onset    Heart Disease Mother     Heart Disease Brother     negative for cardiac disease     SOCIAL HISTORY     Social History     Social History    Marital status:      Spouse name: N/A    Number of children: N/A    Years of education: N/A     Social History Main Topics    Smoking status: Former Smoker     Packs/day: 0.50     Years: 41.00     Types: Cigarettes     Quit date: 11/9/2014    Smokeless tobacco: Never Used    Alcohol use No    Drug use: No    Sexual activity: Not Asked     Other Topics Concern    None     Social History Narrative       MEDICATIONS     Current Facility-Administered Medications   Medication Dose Route Frequency    albuterol-ipratropium (DUO-NEB) 2.5 MG-0.5 MG/3 ML  3 mL Nebulization Q8H RT    hydrALAZINE (APRESOLINE) tablet 100 mg  100 mg Oral TID    doxycycline (VIBRA-TABS) tablet 100 mg  100 mg Oral Q12H    predniSONE (DELTASONE) tablet 40 mg  40 mg Oral DAILY WITH BREAKFAST    sodium chloride (OCEAN) 0.65 % nasal squeeze bottle 2 Spray  2 Spray Both Nostrils Q2H PRN    HYDROmorphone (DILAUDID) syringe 0.5 mg  0.5 mg IntraVENous Q4H PRN    metOLazone (ZAROXOLYN) tablet 5 mg  5 mg Oral DAILY    epoetin charlie (EPOGEN;PROCRIT) injection 10,000 Units  10,000 Units SubCUTAneous Once per day on Thu Sat    ondansetron (ZOFRAN ODT) tablet 8 mg  8 mg Oral Q8H PRN    magnesium oxide (MAG-OX) tablet 400 mg  400 mg Oral BID    lidocaine (XYLOCAINE) 10 mg/mL (1 %) injection 10-20 mL  10-20 mL IntraDERMal Multiple    sodium chloride (NS) flush 5-10 mL  5-10 mL IntraVENous Q8H    sodium chloride (NS) flush 5-10 mL  5-10 mL IntraVENous PRN    bumetanide (BUMEX) injection 2 mg  2 mg IntraVENous BID    sodium chloride (OCEAN) 0.65 % nasal squeeze bottle 2 Spray  2 Spray Both Nostrils Q2H PRN    0.9% sodium chloride infusion 250 mL  250 mL IntraVENous PRN    nitroglycerin (NITROSTAT) tablet 0.4 mg  0.4 mg SubLINGual PRN    oxyCODONE-acetaminophen (PERCOCET 7.5) 7.5-325 mg per tablet 1 Tab  1 Tab Oral Q6H PRN    Warfarin: pharmacy to dose    Other Rx Dosing/Monitoring    amLODIPine (NORVASC) tablet 10 mg  10 mg Oral DAILY    albuterol (PROVENTIL VENTOLIN) nebulizer solution 2.5 mg  2.5 mg Nebulization Q6H PRN    allopurinol (ZYLOPRIM) tablet 100 mg  100 mg Oral DAILY    aspirin delayed-release tablet 81 mg  81 mg Oral DAILY    atorvastatin (LIPITOR) tablet 80 mg  80 mg Oral QHS    calcitRIOL (ROCALTROL) capsule 0.25 mcg  0.25 mcg Oral Q M, W, F & SAT    carvedilol (COREG) tablet 25 mg  25 mg Oral BID WITH MEALS    ergocalciferol (ERGOCALCIFEROL) capsule 50,000 Units  50,000 Units Oral every Tuesday    fluticasone (FLONASE) 50 mcg/actuation nasal spray 2 Spray  2 Spray Both Nostrils DAILY PRN    hydrOXYzine HCl (ATARAX) tablet 25 mg  25 mg Oral TID PRN    isosorbide mononitrate ER (IMDUR) tablet 120 mg  120 mg Oral DAILY    pantoprazole (PROTONIX) tablet 40 mg  40 mg Oral ACB    polyethylene glycol (MIRALAX) packet 17 g  17 g Oral DAILY    senna-docusate (PERICOLACE) 8.6-50 mg per tablet 1 Tab  1 Tab Oral DAILY    sodium chloride (NS) flush 5-10 mL  5-10 mL IntraVENous Q8H    sodium chloride (NS) flush 5-10 mL  5-10 mL IntraVENous PRN    naloxone (NARCAN) injection 0.4 mg  0.4 mg IntraVENous PRN    glucose chewable tablet 16 g  4 Tab Oral PRN    dextrose (D50W) injection syrg 12.5-25 g  12.5-25 g IntraVENous PRN    glucagon (GLUCAGEN) injection 1 mg  1 mg IntraMUSCular PRN    acetaminophen (TYLENOL) tablet 650 mg  650 mg Oral Q4H PRN    diphenhydrAMINE (BENADRYL) capsule 25 mg  25 mg Oral Q4H PRN    insulin lispro (HUMALOG) injection   SubCUTAneous AC&HS       I have reviewed the nurses notes, vitals, problem list, allergy list, medical history, family, social history and medications. REVIEW OF SYMPTOMS      General: Pt denies excessive weight gain or loss. Pt is able to conduct ADL's  HEENT: Denies blurred vision, headaches, hearing loss, epistaxis and difficulty swallowing. Respiratory: Denies cough, congestion, shortness of breath, LARA, wheezing or stridor. Cardiovascular: Denies precordial pain, palpitations, edema or PND  Gastrointestinal: Denies poor appetite, indigestion, abdominal pain or blood in stool  Genitourinary: Denies hematuria, dysuria, increased urinary frequency  Musculoskeletal: Denies joint pain or swelling from muscles or joints  Neurologic: Denies tremor, paresthesias, headache, or sensory motor disturbance  Psychiatric: Denies confusion, insomnia, depression  Integumentray: Denies rash, itching or ulcers. Hematologic: Denies easy bruising, bleeding       PHYSICAL EXAMINATION      Vitals:    06/09/18 0507 06/09/18 0700 06/09/18 0748 06/09/18 1236   BP:   150/75 160/75   Pulse:  72 72 78   Resp:   12 16   Temp:   98 °F (36.7 °C) 98.1 °F (36.7 °C)   SpO2:   98% 98%   Weight: 325 lb 2.9 oz (147.5 kg)      Height:         General: Well developed, in no acute distress. HEENT: No jaundice, oral mucosa moist, no oral ulcers  Neck: Supple, no stiffness, no lymphadenopathy, supple  Heart:  Normal S1/S2 negative S3 or S4.  Regular, no murmur, gallop or rub, no jugular venous distention  Respiratory: Clear bilaterally x 4, no wheezing or rales  Abdomen:   Soft, non-tender, bowel sounds are active.   Extremities:  No edema, normal cap refill, no cyanosis. Musculoskeletal: No clubbing, no deformities  Neuro: A&Ox3, speech clear, gait stable, cooperative, no focal neurologic deficits  Skin: Skin color is normal. No rashes or lesions. Non diaphoretic, moist.  Vascular: 2+ pulses symmetric in all extremities       DIAGNOSTIC DATA      TELEMETRY: Sinus rhythm         LABORATORY DATA      Lab Results   Component Value Date/Time    WBC 10.5 06/08/2018 01:05 AM    Hemoglobin (POC) 9.9 (L) 07/21/2013 09:51 PM    HGB 8.7 (L) 06/08/2018 01:05 AM    Hematocrit (POC) 29 (L) 07/21/2013 09:51 PM    HCT 27.6 (L) 06/08/2018 01:05 AM    PLATELET 155 79/36/6421 01:05 AM    MCV 94.8 06/08/2018 01:05 AM      Lab Results   Component Value Date/Time    Sodium 136 06/09/2018 08:40 AM    Potassium 4.5 06/09/2018 08:40 AM    Chloride 102 06/09/2018 08:40 AM    CO2 25 06/09/2018 08:40 AM    Anion gap 9 06/09/2018 08:40 AM    Glucose 200 (H) 06/09/2018 08:40 AM    BUN 84 (H) 06/09/2018 08:40 AM    Creatinine 4.22 (H) 06/09/2018 08:40 AM    BUN/Creatinine ratio 20 06/09/2018 08:40 AM    GFR est AA 13 (L) 06/09/2018 08:40 AM    GFR est non-AA 11 (L) 06/09/2018 08:40 AM    Calcium 9.3 06/09/2018 08:40 AM    Bilirubin, total 0.5 11/21/2017 12:40 PM    AST (SGOT) 24 11/21/2017 12:40 PM    Alk. phosphatase 94 11/21/2017 12:40 PM    Protein, total 6.8 11/21/2017 12:40 PM    Albumin 3.3 (L) 06/09/2018 08:40 AM    Globulin 3.6 11/21/2017 12:40 PM    A-G Ratio 0.9 (L) 11/21/2017 12:40 PM    ALT (SGPT) 19 11/21/2017 12:40 PM           ASSESSMENT     1)Chronic recurrent anginal chest pain, exertional and non exertional.  - CAD with 1v disease/RCA  -cont ASA/statin/BB  - LHC cath unable to be done d/t respiratory status. -on high dose of imdur       2) Chronic HFpEF, class III, s/p CardioMEMS for PA monitoring:   - RHC showed moderate-severe PA HTN, post capillary. Marked elevation in biV filling pressures. Cardiomem re- calibrated   -continue IV bumex 2 mg BID, monitor creatinine closely       3) CKD  - creatinine stable, will continue aggressive diuresis, metolazone added per nephrology   - Nephrology following, appreciate recommendations  - she does have an AV fistula      4) Chronic severe anemia, refractory to Procrit:   - hematology following   S/p BMB- results pending   H/H stable      5) Chronic anticoagulation for DVT   - s/p Vit K and FFP (prior to BM biopsy)   - coumadin restarted. INr sub therapeutic      6) Hypertension: -160's. Increased hydralazine 100 mg to TID  - coreg/norvasc/imdur.        Viviane Lion MD  Cardiac Electrophysiology / Cardiology    [address]  [facility], [facility], [address], [facility]  [phone] / [phone] Fax   [phone] / [phone] Fax

## 2018-06-09 NOTE — PROGRESS NOTES
Shift Summary  1930: Bedside and Verbal shift change report given to Bree Ross RN (oncoming nurse) by Leial Martinez RN (offgoing nurse). Report included the following information SBAR, Kardex, Procedure Summary, Intake/Output, MAR, Accordion, Recent Results and Cardiac Rhythm NSR, AVB1.       0730: Bedside and Verbal shift change report given to Jade Rodriguez RN (oncoming nurse) by Bree Ross RN (offgoing nurse). Report included the following information SBAR, Kardex, Procedure Summary, Intake/Output, MAR, Accordion and Recent Results. Care Plan Summary  Problem: Falls - Risk of  Goal: *Absence of Falls  Document Sarah Fall Risk and appropriate interventions in the flowsheet.    Outcome: Progressing Towards Goal  Fall Risk Interventions:  Mobility Interventions: Assess mobility with egress test, Bed/chair exit alarm, Patient to call before getting OOB, PT Consult for mobility concerns         Medication Interventions: Teach patient to arise slowly, Patient to call before getting OOB    Elimination Interventions: Call light in reach    History of Falls Interventions: Bed/chair exit alarm, Consult care management for discharge planning, Door open when patient unattended

## 2018-06-09 NOTE — PROGRESS NOTES
Ezio Craig [facility] [address]  [address] [facility], [address] [place] [address]  [phone]      Medical Progress Note      NAME: Mervat Lindsay   :  1955  MRM:  [id number]    Date/Time: 2018  8:55 AM       Assessment and Plan:     Chronic respiratory failure with hypoxia / COPD, severe / ILD (interstitial lung disease) / Pulmonary HTN - POA, mod to severe plm HTN laquita contributes to her LARA and hypoxia.  No curative treatment, just diuretics.  Likely to progress. Palliative care consulted.  Continue Oxygen as needed. CT  showed multiple complex abnormalities, which s parainfluenza vs progressive changes. No fever, no leukocytosis. Parainfluenza virus - Tested positive on resp screen. Supportive care. Pulmonary consulted and placed her on doxycycline. I would not have.      Chronic diastolic heart failure - POA, driven by ILD, pulm HTN, progression to cor pulmonale is in her future.  MEMs place to monitor pulm HTN. Continue bumex at higher dose, added metolazone, strict I's and O's, daily weights, low salt      DM (diabetes mellitus), type 2 with renal, vascular and neurological complications - Diabetic diet and counseling.  SSI per protocol. Not on home meds.  ESRD has likely potentiated her endogenous insulin. A1c Useless in setting of anemia.       CKD (chronic kidney disease) stage 4 - POA and a bit worse after cath, now slightly better.  Nephrology consulted. Chalino Robless to progress to ESRD. Chinedu Lopez is a poor candidate for HD.   She has an AV fistula      CAD (coronary Artery Disease) Native Artery / HTN (hypertension) / Chest pain / Dyspnea - Pain chronic, unlikely AMI.  Cath failed. Continue ASA, statin, BB, Imdur, as medical management.  No further plans for cath or workup.      Nausea and anorexia / Gastroparesis / Esophageal reflux - PPI. Better this AM.  May be due to poor GI circulation resulting from excess diuresis.  Start PPI. Clear liquid diet.   Monitor. Soni Miller lactic acid.      Morbid obesity / JASEN on CPAP - continue CPAP, advise weight loss       Normocytic anemia - POA, heme/onc consulted. Kelsy Corado just chronic disease. s/p BMB, pathology negative. EPO per renal.      Hx of deep venous thrombosis / Warfarin anticoagulation - Coumadin was held for cath, now back. diagnosed with DVT in 6/2017. Does not need bridge.      Hypomagnesemia - Replete and follow.      Gout - stable on allopurinol          Subjective:     Chief Complaint:  She states breathing and appetite a bit better, though still poor    ROS:  (bold if positive, if negative)    CoughSOB/DOENausea  Tolerating minimal PT  Tolerating clear Diet        Objective:     Last 24hrs VS reviewed since prior progress note.  Most recent are:    Visit Vitals    /75 (BP 1 Location: Right arm, BP Patient Position: At rest;Sitting)    Pulse 72    Temp 98 °F (36.7 °C)    Resp 12    Ht 5' 10\" (1.778 m)    Wt 147.5 kg (325 lb 2.9 oz)    SpO2 96%    BMI 46.66 kg/m2     SpO2 Readings from Last 6 Encounters:   06/09/18 96%   05/15/18 91%   04/03/18 96%   03/20/18 98%   01/24/18 98%   01/10/18 95%    O2 Flow Rate (L/min): 2 l/min     Intake/Output Summary (Last 24 hours) at 06/09/18 0855  Last data filed at 06/09/18 0507   Gross per 24 hour   Intake             1980 ml   Output             5000 ml   Net            -3020 ml        Physical Exam:    Gen:  Morbid obese, in no acute distress  HEENT:  Pink conjunctivae, PERRL, hearing intact to voice, moist mucous membranes  Neck:  Supple, without masses, thyroid non-tender  Resp:  No accessory muscle use, distant breath sounds with some wheezes and rhonchi  Card:  No murmurs, distant S1, S2 without thrills, bruits, 2+ peripheral edema  Abd:  Soft, non-tender, obese distended, normoactive bowel sounds are present, no mass  Lymph:  No cervical or inguinal adenopathy  Musc:  No cyanosis or clubbing  Skin:  No rashes or ulcers, skin turgor is good, R wrist in brace  Neuro:  Cranial nerves are grossly intact, no focal motor weakness, follows commands   Psych:  Poor insight, oriented to person, place and time, alert    Telemetry reviewed:   normal sinus rhythm  __________________________________________________________________  Medications Reviewed: (see below)  Medications:     Current Facility-Administered Medications   Medication Dose Route Frequency    albuterol-ipratropium (DUO-NEB) 2.5 MG-0.5 MG/3 ML  3 mL Nebulization Q8H RT    hydrALAZINE (APRESOLINE) tablet 100 mg  100 mg Oral TID    doxycycline (VIBRA-TABS) tablet 100 mg  100 mg Oral Q12H    predniSONE (DELTASONE) tablet 40 mg  40 mg Oral DAILY WITH BREAKFAST    sodium chloride (OCEAN) 0.65 % nasal squeeze bottle 2 Spray  2 Spray Both Nostrils Q2H PRN    HYDROmorphone (DILAUDID) syringe 0.5 mg  0.5 mg IntraVENous Q4H PRN    metOLazone (ZAROXOLYN) tablet 5 mg  5 mg Oral DAILY    epoetin charlie (EPOGEN;PROCRIT) injection 10,000 Units  10,000 Units SubCUTAneous Once per day on Thu Sat    ondansetron (ZOFRAN ODT) tablet 8 mg  8 mg Oral Q8H PRN    magnesium oxide (MAG-OX) tablet 400 mg  400 mg Oral BID    lidocaine (XYLOCAINE) 10 mg/mL (1 %) injection 10-20 mL  10-20 mL IntraDERMal Multiple    sodium chloride (NS) flush 5-10 mL  5-10 mL IntraVENous Q8H    sodium chloride (NS) flush 5-10 mL  5-10 mL IntraVENous PRN    bumetanide (BUMEX) injection 2 mg  2 mg IntraVENous BID    sodium chloride (OCEAN) 0.65 % nasal squeeze bottle 2 Spray  2 Spray Both Nostrils Q2H PRN    0.9% sodium chloride infusion 250 mL  250 mL IntraVENous PRN    nitroglycerin (NITROSTAT) tablet 0.4 mg  0.4 mg SubLINGual PRN    oxyCODONE-acetaminophen (PERCOCET 7.5) 7.5-325 mg per tablet 1 Tab  1 Tab Oral Q6H PRN    Warfarin: pharmacy to dose    Other Rx Dosing/Monitoring    amLODIPine (NORVASC) tablet 10 mg  10 mg Oral DAILY    albuterol (PROVENTIL VENTOLIN) nebulizer solution 2.5 mg  2.5 mg Nebulization Q6H PRN    allopurinol (ZYLOPRIM) tablet 100 mg  100 mg Oral DAILY    aspirin delayed-release tablet 81 mg  81 mg Oral DAILY    atorvastatin (LIPITOR) tablet 80 mg  80 mg Oral QHS    calcitRIOL (ROCALTROL) capsule 0.25 mcg  0.25 mcg Oral Q M, W, F & SAT    carvedilol (COREG) tablet 25 mg  25 mg Oral BID WITH MEALS    ergocalciferol (ERGOCALCIFEROL) capsule 50,000 Units  50,000 Units Oral every Tuesday    fluticasone (FLONASE) 50 mcg/actuation nasal spray 2 Spray  2 Spray Both Nostrils DAILY PRN    hydrOXYzine HCl (ATARAX) tablet 25 mg  25 mg Oral TID PRN    isosorbide mononitrate ER (IMDUR) tablet 120 mg  120 mg Oral DAILY    pantoprazole (PROTONIX) tablet 40 mg  40 mg Oral ACB    polyethylene glycol (MIRALAX) packet 17 g  17 g Oral DAILY    senna-docusate (PERICOLACE) 8.6-50 mg per tablet 1 Tab  1 Tab Oral DAILY    sodium chloride (NS) flush 5-10 mL  5-10 mL IntraVENous Q8H    sodium chloride (NS) flush 5-10 mL  5-10 mL IntraVENous PRN    naloxone (NARCAN) injection 0.4 mg  0.4 mg IntraVENous PRN    glucose chewable tablet 16 g  4 Tab Oral PRN    dextrose (D50W) injection syrg 12.5-25 g  12.5-25 g IntraVENous PRN    glucagon (GLUCAGEN) injection 1 mg  1 mg IntraMUSCular PRN    acetaminophen (TYLENOL) tablet 650 mg  650 mg Oral Q4H PRN    diphenhydrAMINE (BENADRYL) capsule 25 mg  25 mg Oral Q4H PRN    insulin lispro (HUMALOG) injection   SubCUTAneous AC&HS        Lab Data Reviewed: (see below)  Lab Review:     Recent Labs      06/08/18   0105  06/07/18   0530   WBC  10.5  11.0  11.2*   HGB  8.7*  8.3*  8.4*   HCT  27.6*  26.9*  26.7*   PLT  166  157  152     Recent Labs      06/09/18   0452  06/08/18   0105  06/07/18   0530   NA   --   140  137   K   --   4.6  4.5   CL   --   105  106   CO2   --   25  23   GLU   --   157*  147*   BUN   --   82*  84*   CREA   --   3.99*  4.07*   CA   --   9.2  8.9   MG   --   2.0  2.0   PHOS   --   4.8*  4.8*   INR  2.1*  1.5*  1.5*     Lab Results   Component Value Date/Time    Glucose (POC) 158 (H) 06/09/2018 07:35 AM    Glucose (POC) 227 (H) 06/08/2018 08:39 PM    Glucose (POC) 207 (H) 06/08/2018 04:15 PM    Glucose (POC) 155 (H) 06/08/2018 11:28 AM    Glucose (POC) 181 (H) 06/08/2018 07:41 AM     No results for input(s): PH, PCO2, PO2, HCO3, FIO2 in the last 72 hours. Recent Labs      06/09/18   0452  06/08/18   0105  06/07/18   0530   INR  2.1*  1.5*  1.5*     All Micro Results     Procedure Component Value Units Date/Time    RESPIRATORY PANEL,PCR,NASOPHARYNGEAL [[id number]]  (Abnormal) Collected:  06/08/18 0824    Order Status:  Completed Specimen:  Nasopharyngeal Updated:  06/08/18 1247     Adenovirus NOT DETECTED        Coronavirus 229E NOT DETECTED        Coronavirus HKU1 NOT DETECTED        Coronavirus CVNL63 NOT DETECTED        Coronavirus OC43 NOT DETECTED        Metapneumovirus NOT DETECTED        Rhinovirus and Enterovirus NOT DETECTED        Influenza A NOT DETECTED        Influenza A, subtype H1 NOT DETECTED        Influenza A, subtype H3 NOT DETECTED        INFLUENZA A H1N1 PCR NOT DETECTED        Influenza B NOT DETECTED        Parainfluenza 1 NOT DETECTED        Parainfluenza 2 NOT DETECTED        Parainfluenza 3 DETECTED (A)        Parainfluenza virus 4 NOT DETECTED        RSV by PCR NOT DETECTED        Bordetella pertussis - PCR NOT DETECTED        Chlamydophila pneumoniae DNA, QL, PCR NOT DETECTED        Mycoplasma pneumoniae DNA, QL, PCR NOT [facility], UR/CSF [[id number]] Collected:  06/08/18 0826    Order Status:  Completed Specimen:  Other Updated:  06/08/18 0835    URINE CULTURE HOLD SAMPLE [[id number]] Collected:  06/08/18 0826    Order Status:  Completed Specimen:  Serum Updated:  06/08/18 0835     Urine culture hold         URINE ON HOLD IN MICROBIOLOGY DEPT FOR 3 DAYS. IF UNPRESERVED URINE IS SUBMITTED, IT CANNOT BE USED FOR ADDITIONAL TESTING AFTER 24 HRS, RECOLLECTION WILL BE REQUIRED. I have reviewed notes of prior 24hr.     Other pertinent lab: none    Total time spent with patient: 00 [id number] Kemi discussed with: Patient, Family, Nursing Staff, Consultant/Specialist and >50% of time spent in counseling and coordination of care    Discussed:  Care Plan    Prophylaxis:  Lovenox and H2B/PPI    Disposition:   PT, OT, RN           ___________________________________________________    Attending Physician: Bharathi Covington MD

## 2018-06-10 LAB
ALBUMIN SERPL-MCNC: 3.5 G/DL (ref 3.5–5)
ANION GAP SERPL CALC-SCNC: 9 MMOL/L (ref 5–15)
BUN SERPL-MCNC: 98 MG/DL (ref 6–20)
BUN/CREAT SERPL: 24 (ref 12–20)
CALCIUM SERPL-MCNC: 9.4 MG/DL (ref 8.5–10.1)
CHLORIDE SERPL-SCNC: 102 MMOL/L (ref 97–108)
CO2 SERPL-SCNC: 25 MMOL/L (ref 21–32)
CREAT SERPL-MCNC: 4.14 MG/DL (ref 0.55–1.02)
ERYTHROCYTE [DISTWIDTH] IN BLOOD BY AUTOMATED COUNT: 16 % (ref 11.5–14.5)
GLUCOSE BLD STRIP.AUTO-MCNC: 158 MG/DL (ref 65–100)
GLUCOSE BLD STRIP.AUTO-MCNC: 228 MG/DL (ref 65–100)
GLUCOSE BLD STRIP.AUTO-MCNC: 234 MG/DL (ref 65–100)
GLUCOSE BLD STRIP.AUTO-MCNC: 337 MG/DL (ref 65–100)
GLUCOSE SERPL-MCNC: 148 MG/DL (ref 65–100)
HCT VFR BLD AUTO: 26.7 % (ref 35–47)
HGB BLD-MCNC: 8.5 G/DL (ref 11.5–16)
INR PPP: 2.6 (ref 0.9–1.1)
MAGNESIUM SERPL-MCNC: 2.2 MG/DL (ref 1.6–2.4)
MCH RBC QN AUTO: 29.8 PG (ref 26–34)
MCHC RBC AUTO-ENTMCNC: 31.8 G/DL (ref 30–36.5)
MCV RBC AUTO: 93.7 FL (ref 80–99)
NRBC # BLD: 0 K/UL (ref 0–0.01)
NRBC BLD-RTO: 0 PER 100 WBC
PHOSPHATE SERPL-MCNC: 4.6 MG/DL (ref 2.6–4.7)
PLATELET # BLD AUTO: 180 K/UL (ref 150–400)
PMV BLD AUTO: 11 FL (ref 8.9–12.9)
POTASSIUM SERPL-SCNC: 4.6 MMOL/L (ref 3.5–5.1)
PROTHROMBIN TIME: 26.2 SEC (ref 9–11.1)
RBC # BLD AUTO: 2.85 M/UL (ref 3.8–5.2)
SERVICE CMNT-IMP: ABNORMAL
SODIUM SERPL-SCNC: 136 MMOL/L (ref 136–145)
WBC # BLD AUTO: 9 K/UL (ref 3.6–11)

## 2018-06-10 PROCEDURE — 74011250637 HC RX REV CODE- 250/637: Performed by: NURSE PRACTITIONER

## 2018-06-10 PROCEDURE — 74011250637 HC RX REV CODE- 250/637: Performed by: PHYSICIAN ASSISTANT

## 2018-06-10 PROCEDURE — 36415 COLL VENOUS BLD VENIPUNCTURE: CPT | Performed by: NURSE PRACTITIONER

## 2018-06-10 PROCEDURE — 85027 COMPLETE CBC AUTOMATED: CPT | Performed by: INTERNAL MEDICINE

## 2018-06-10 PROCEDURE — 74011636637 HC RX REV CODE- 636/637: Performed by: INTERNAL MEDICINE

## 2018-06-10 PROCEDURE — 65660000000 HC RM CCU STEPDOWN

## 2018-06-10 PROCEDURE — 74011250637 HC RX REV CODE- 250/637: Performed by: INTERNAL MEDICINE

## 2018-06-10 PROCEDURE — 85610 PROTHROMBIN TIME: CPT | Performed by: NURSE PRACTITIONER

## 2018-06-10 PROCEDURE — 82962 GLUCOSE BLOOD TEST: CPT

## 2018-06-10 PROCEDURE — 94640 AIRWAY INHALATION TREATMENT: CPT

## 2018-06-10 PROCEDURE — 94660 CPAP INITIATION&MGMT: CPT

## 2018-06-10 PROCEDURE — 74011000250 HC RX REV CODE- 250: Performed by: NURSE PRACTITIONER

## 2018-06-10 PROCEDURE — 74011000250 HC RX REV CODE- 250: Performed by: INTERNAL MEDICINE

## 2018-06-10 PROCEDURE — 80069 RENAL FUNCTION PANEL: CPT | Performed by: INTERNAL MEDICINE

## 2018-06-10 PROCEDURE — 83735 ASSAY OF MAGNESIUM: CPT | Performed by: INTERNAL MEDICINE

## 2018-06-10 PROCEDURE — 74011250636 HC RX REV CODE- 250/636: Performed by: INTERNAL MEDICINE

## 2018-06-10 PROCEDURE — 77010033678 HC OXYGEN DAILY

## 2018-06-10 PROCEDURE — 74011636637 HC RX REV CODE- 636/637: Performed by: PHYSICIAN ASSISTANT

## 2018-06-10 RX ORDER — CLONIDINE HYDROCHLORIDE 0.1 MG/1
0.1 TABLET ORAL
Status: COMPLETED | OUTPATIENT
Start: 2018-06-10 | End: 2018-06-10

## 2018-06-10 RX ORDER — WARFARIN 2 MG/1
2 TABLET ORAL EVERY EVENING
Status: COMPLETED | OUTPATIENT
Start: 2018-06-10 | End: 2018-06-10

## 2018-06-10 RX ADMIN — INSULIN LISPRO 3 UNITS: 100 INJECTION, SOLUTION INTRAVENOUS; SUBCUTANEOUS at 08:35

## 2018-06-10 RX ADMIN — INSULIN LISPRO 4 UNITS: 100 INJECTION, SOLUTION INTRAVENOUS; SUBCUTANEOUS at 17:33

## 2018-06-10 RX ADMIN — HYDRALAZINE HYDROCHLORIDE 100 MG: 25 TABLET ORAL at 08:36

## 2018-06-10 RX ADMIN — Medication 10 ML: at 22:36

## 2018-06-10 RX ADMIN — ISOSORBIDE MONONITRATE 120 MG: 60 TABLET ORAL at 08:36

## 2018-06-10 RX ADMIN — BUMETANIDE 2 MG: 0.25 INJECTION INTRAMUSCULAR; INTRAVENOUS at 08:35

## 2018-06-10 RX ADMIN — HYDRALAZINE HYDROCHLORIDE 100 MG: 25 TABLET ORAL at 22:35

## 2018-06-10 RX ADMIN — ALLOPURINOL 100 MG: 100 TABLET ORAL at 08:37

## 2018-06-10 RX ADMIN — ASPIRIN 81 MG: 81 TABLET, COATED ORAL at 08:36

## 2018-06-10 RX ADMIN — Medication 400 MG: at 17:33

## 2018-06-10 RX ADMIN — IPRATROPIUM BROMIDE AND ALBUTEROL SULFATE 3 ML: .5; 3 SOLUTION RESPIRATORY (INHALATION) at 15:23

## 2018-06-10 RX ADMIN — PANTOPRAZOLE SODIUM 40 MG: 40 TABLET, DELAYED RELEASE ORAL at 08:37

## 2018-06-10 RX ADMIN — HYDROMORPHONE HYDROCHLORIDE 0.5 MG: 1 INJECTION, SOLUTION INTRAMUSCULAR; INTRAVENOUS; SUBCUTANEOUS at 22:36

## 2018-06-10 RX ADMIN — CARVEDILOL 25 MG: 12.5 TABLET, FILM COATED ORAL at 08:36

## 2018-06-10 RX ADMIN — Medication 10 ML: at 17:34

## 2018-06-10 RX ADMIN — ATORVASTATIN CALCIUM 80 MG: 20 TABLET, FILM COATED ORAL at 22:36

## 2018-06-10 RX ADMIN — CARVEDILOL 25 MG: 12.5 TABLET, FILM COATED ORAL at 17:33

## 2018-06-10 RX ADMIN — PREDNISONE 40 MG: 20 TABLET ORAL at 08:36

## 2018-06-10 RX ADMIN — WARFARIN SODIUM 2 MG: 2 TABLET ORAL at 17:33

## 2018-06-10 RX ADMIN — POLYETHYLENE GLYCOL (3350) 17 G: 17 POWDER, FOR SOLUTION ORAL at 08:35

## 2018-06-10 RX ADMIN — AMLODIPINE BESYLATE 10 MG: 5 TABLET ORAL at 08:36

## 2018-06-10 RX ADMIN — BUMETANIDE 2 MG: 0.25 INJECTION INTRAMUSCULAR; INTRAVENOUS at 17:32

## 2018-06-10 RX ADMIN — METOLAZONE 5 MG: 5 TABLET ORAL at 08:36

## 2018-06-10 RX ADMIN — Medication 400 MG: at 08:37

## 2018-06-10 RX ADMIN — IPRATROPIUM BROMIDE AND ALBUTEROL SULFATE 3 ML: .5; 3 SOLUTION RESPIRATORY (INHALATION) at 07:09

## 2018-06-10 RX ADMIN — IPRATROPIUM BROMIDE AND ALBUTEROL SULFATE 3 ML: .5; 3 SOLUTION RESPIRATORY (INHALATION) at 23:06

## 2018-06-10 RX ADMIN — DOXYCYCLINE HYCLATE 100 MG: 100 TABLET, COATED ORAL at 12:04

## 2018-06-10 RX ADMIN — INSULIN LISPRO 4 UNITS: 100 INJECTION, SOLUTION INTRAVENOUS; SUBCUTANEOUS at 12:04

## 2018-06-10 RX ADMIN — CLONIDINE HYDROCHLORIDE 0.1 MG: 0.1 TABLET ORAL at 14:30

## 2018-06-10 RX ADMIN — STANDARDIZED SENNA CONCENTRATE AND DOCUSATE SODIUM 1 TABLET: 8.6; 5 TABLET, FILM COATED ORAL at 08:36

## 2018-06-10 RX ADMIN — Medication 10 ML: at 05:02

## 2018-06-10 RX ADMIN — DOXYCYCLINE HYCLATE 100 MG: 100 TABLET, COATED ORAL at 22:35

## 2018-06-10 RX ADMIN — HYDRALAZINE HYDROCHLORIDE 100 MG: 25 TABLET ORAL at 17:32

## 2018-06-10 RX ADMIN — INSULIN LISPRO 5 UNITS: 100 INJECTION, SOLUTION INTRAVENOUS; SUBCUTANEOUS at 22:36

## 2018-06-10 NOTE — PROGRESS NOTES
SUBJECTIVE      Patient noted to be quite hypertensive as compared to yesterday despite the recent adjustment in hydralazine dosing. She has a positive fluid balance since yesterday. She remains on IV diuretics. Creatinine remained stable. Reports feeling the same as yesterday overall. BP quite elevated today. Of note, patient was about to eat her box of [employer] as I met with her today.        ACTIVE PROBLEM LIST     Patient Active Problem List    Diagnosis Date Noted    Hx of deep venous thrombosis 05/30/2018    Diabetic gastroparesis (HCC)     GERD (gastroesophageal reflux disease)     Rheumatoid arteritis     DM type 2 causing neurological disease ([address]     DM type 2 causing renal disease ([address]     DM type 2 causing vascular disease ([address]     Gout     [facility] obstructive pulmonary disease) [facility])     Chest pain 11/21/2017    ILD (interstitial lung disease) ([address] 08/20/2017    Warfarin anticoagulation 08/20/2017    COPD, severe ([address] 06/21/2017    CKD (chronic kidney disease) stage 4, GFR 15-29 ml/min ([address] 02/10/2017    DM (diabetes mellitus), type 2 with renal complications ([address] 13/88/4729    Normocytic anemia 05/26/2013    Chronic diastolic heart failure ([address] 44/36/2361    Diastolic heart failure ([address] 10/05/2012    HTN (hypertension) 10/01/2012    Morbid obesity 10/01/2012    Esophageal reflux 10/01/2012    Gastroparesis 10/01/2012    Pulmonary HTN 03/21/2012    CAD (coronary Artery Disease) Native Artery 02/16/2011    JASEN on CPAP 02/16/2011    Sleep apnea 02/16/2011          MEDICATIONS     Current Facility-Administered Medications   Medication Dose Route Frequency    albuterol-ipratropium (DUO-NEB) 2.5 MG-0.5 MG/3 ML  3 mL Nebulization Q8H RT    labetalol (NORMODYNE;TRANDATE) injection 20 mg  20 mg IntraVENous Q4H PRN    hydrALAZINE (APRESOLINE) tablet 100 mg  100 mg Oral TID    doxycycline (VIBRA-TABS) tablet 100 mg  100 mg Oral Q12H    predniSONE (DELTASONE) tablet 40 mg  40 mg Oral DAILY WITH BREAKFAST    sodium chloride (OCEAN) 0.65 % nasal squeeze bottle 2 Spray  2 Spray Both Nostrils Q2H PRN    HYDROmorphone (DILAUDID) syringe 0.5 mg  0.5 mg IntraVENous Q4H PRN    metOLazone (ZAROXOLYN) tablet 5 mg  5 mg Oral DAILY    epoetin charlie (EPOGEN;PROCRIT) injection 10,000 Units  10,000 Units SubCUTAneous Once per day on Thu Sat    ondansetron (ZOFRAN ODT) tablet 8 mg  8 mg Oral Q8H PRN    magnesium oxide (MAG-OX) tablet 400 mg  400 mg Oral BID    lidocaine (XYLOCAINE) 10 mg/mL (1 %) injection 10-20 mL  10-20 mL IntraDERMal Multiple    sodium chloride (NS) flush 5-10 mL  5-10 mL IntraVENous Q8H    sodium chloride (NS) flush 5-10 mL  5-10 mL IntraVENous PRN    bumetanide (BUMEX) injection 2 mg  2 mg IntraVENous BID    sodium chloride (OCEAN) 0.65 % nasal squeeze bottle 2 Spray  2 Spray Both Nostrils Q2H PRN    0.9% sodium chloride infusion 250 mL  250 mL IntraVENous PRN    nitroglycerin (NITROSTAT) tablet 0.4 mg  0.4 mg SubLINGual PRN    oxyCODONE-acetaminophen (PERCOCET 7.5) 7.5-325 mg per tablet 1 Tab  1 Tab Oral Q6H PRN    Warfarin: pharmacy to dose    Other Rx Dosing/Monitoring    amLODIPine (NORVASC) tablet 10 mg  10 mg Oral DAILY    albuterol (PROVENTIL VENTOLIN) nebulizer solution 2.5 mg  2.5 mg Nebulization Q6H PRN    allopurinol (ZYLOPRIM) tablet 100 mg  100 mg Oral DAILY    aspirin delayed-release tablet 81 mg  81 mg Oral DAILY    atorvastatin (LIPITOR) tablet 80 mg  80 mg Oral QHS    calcitRIOL (ROCALTROL) capsule 0.25 mcg  0.25 mcg Oral Q M, W, F & SAT    carvedilol (COREG) tablet 25 mg  25 mg Oral BID WITH MEALS    ergocalciferol (ERGOCALCIFEROL) capsule 50,000 Units  50,000 Units Oral every Tuesday    fluticasone (FLONASE) 50 mcg/actuation nasal spray 2 Spray  2 Spray Both Nostrils DAILY PRN    hydrOXYzine HCl (ATARAX) tablet 25 mg  25 mg Oral TID PRN    isosorbide mononitrate ER (IMDUR) tablet 120 mg  120 mg Oral DAILY    pantoprazole (PROTONIX) tablet 40 mg  40 mg Oral ACB    polyethylene glycol (MIRALAX) packet 17 g  17 g Oral DAILY    senna-docusate (PERICOLACE) 8.6-50 mg per tablet 1 Tab  1 Tab Oral DAILY    sodium chloride (NS) flush 5-10 mL  5-10 mL IntraVENous Q8H    sodium chloride (NS) flush 5-10 mL  5-10 mL IntraVENous PRN    naloxone (NARCAN) injection 0.4 mg  0.4 mg IntraVENous PRN    glucose chewable tablet 16 g  4 Tab Oral PRN    dextrose (D50W) injection syrg 12.5-25 g  12.5-25 g IntraVENous PRN    glucagon (GLUCAGEN) injection 1 mg  1 mg IntraMUSCular PRN    acetaminophen (TYLENOL) tablet 650 mg  650 mg Oral Q4H PRN    diphenhydrAMINE (BENADRYL) capsule 25 mg  25 mg Oral Q4H PRN    insulin lispro (HUMALOG) injection   SubCUTAneous AC&HS          PAST MEDICAL HISTORY     Past Medical History:   Diagnosis Date     Sleep Apnea 2/16/2011    [facility]     CAD (coronary Artery Disease) Native Artery 2/16/2011    CKD (chronic kidney disease) stage 4, GFR 15-29 ml/min ([facility]) 2/10/2017    COPD (chronic obstructive pulmonary disease) ([facility])     Diabetic gastroparesis ([facility])     Diastolic heart failure ([facility] 10/5/2012    DM type 2 causing neurological disease ([facility]     DM type 2 causing renal disease ([facility]     DM type 2 causing vascular disease ([facility]     Esophageal stricture 2012    dialted Dr. Wade Mis G6PD deficiency ([facility]     trait    GERD (gastroesophageal reflux disease)     Gout     ILD (interstitial lung disease) ([address]     open lung bx CJW 2010    Morbid obesity ([address]     OA (osteoarthritis)     Ovarian cancer ([address]     cervical and uterine    Rheumatoid arteritis     S/P left pulmonary artery pressure sensor implant placement 8/31/2017    [employer]     Tobacco use disorder 3/21/2012    Uterine cervix cancer ([address]     Vitamin D deficiency 8/9/2013           PAST SURGICAL HISTORY     Past Surgical History:   Procedure Laterality Date    CARDIAC SURG PROCEDURE UNLIST      stents    COLONOSCOPY N/A 6/24/2016    COLONOSCOPY performed by Mony Nicolas MD at [facility] HX APPENDECTOMY      HX CARPAL TUNNEL RELEASE      bilateral    HX CHOLECYSTECTOMY      HX HERNIA REPAIR      HX HYSTERECTOMY      HX ORTHOPAEDIC  11/12/12    right knee replacement    HX TONSIL AND ADENOIDECTOMY      UPPER GI ENDOSCOPY,DILATN W GUIDE  6/24/2016               ALLERGIES     Allergies   Allergen Reactions    Contrast Dye [Iodine] Anaphylaxis     Tolerates when pre medicated w/ IV solumedrol and benadryl prior to procedure     Levaquin [Levofloxacin] Nausea and Vomiting    Morphine Hives and Itching          FAMILY HISTORY     Family History   Problem Relation Age of Onset    Heart Disease Mother     Heart Disease Brother     negative for cardiac disease     SOCIAL HISTORY     Social History     Social History    Marital status:      Spouse name: N/A    Number of children: N/A    Years of education: N/A     Social History Main Topics    Smoking status: Former Smoker     Packs/day: 0.50     Years: 41.00     Types: Cigarettes     Quit date: 11/9/2014    Smokeless tobacco: Never Used    Alcohol use No    Drug use: No    Sexual activity: Not Asked     Other Topics Concern    None     Social History Narrative       MEDICATIONS     Current Facility-Administered Medications   Medication Dose Route Frequency    albuterol-ipratropium (DUO-NEB) 2.5 MG-0.5 MG/3 ML  3 mL Nebulization Q8H RT    labetalol (NORMODYNE;TRANDATE) injection 20 mg  20 mg IntraVENous Q4H PRN    hydrALAZINE (APRESOLINE) tablet 100 mg  100 mg Oral TID    doxycycline (VIBRA-TABS) tablet 100 mg  100 mg Oral Q12H    predniSONE (DELTASONE) tablet 40 mg  40 mg Oral DAILY WITH BREAKFAST    sodium chloride (OCEAN) 0.65 % nasal squeeze bottle 2 Spray  2 Spray Both Nostrils Q2H PRN    HYDROmorphone (DILAUDID) syringe 0.5 mg  0.5 mg IntraVENous Q4H PRN    metOLazone (ZAROXOLYN) tablet 5 mg  5 mg Oral DAILY    epoetin charlie (EPOGEN;PROCRIT) injection 10,000 Units  10,000 Units SubCUTAneous Once per day on Thu Sat    ondansetron (ZOFRAN ODT) tablet 8 mg  8 mg Oral Q8H PRN    magnesium oxide (MAG-OX) tablet 400 mg  400 mg Oral BID    lidocaine (XYLOCAINE) 10 mg/mL (1 %) injection 10-20 mL  10-20 mL IntraDERMal Multiple    sodium chloride (NS) flush 5-10 mL  5-10 mL IntraVENous Q8H    sodium chloride (NS) flush 5-10 mL  5-10 mL IntraVENous PRN    bumetanide (BUMEX) injection 2 mg  2 mg IntraVENous BID    sodium chloride (OCEAN) 0.65 % nasal squeeze bottle 2 Spray  2 Spray Both Nostrils Q2H PRN    0.9% sodium chloride infusion 250 mL  250 mL IntraVENous PRN    nitroglycerin (NITROSTAT) tablet 0.4 mg  0.4 mg SubLINGual PRN    oxyCODONE-acetaminophen (PERCOCET 7.5) 7.5-325 mg per tablet 1 Tab  1 Tab Oral Q6H PRN    Warfarin: pharmacy to dose    Other Rx Dosing/Monitoring    amLODIPine (NORVASC) tablet 10 mg  10 mg Oral DAILY    albuterol (PROVENTIL VENTOLIN) nebulizer solution 2.5 mg  2.5 mg Nebulization Q6H PRN    allopurinol (ZYLOPRIM) tablet 100 mg  100 mg Oral DAILY    aspirin delayed-release tablet 81 mg  81 mg Oral DAILY    atorvastatin (LIPITOR) tablet 80 mg  80 mg Oral QHS    calcitRIOL (ROCALTROL) capsule 0.25 mcg  0.25 mcg Oral Q M, W, F & SAT    carvedilol (COREG) tablet 25 mg  25 mg Oral BID WITH MEALS    ergocalciferol (ERGOCALCIFEROL) capsule 50,000 Units  50,000 Units Oral every Tuesday    fluticasone (FLONASE) 50 mcg/actuation nasal spray 2 Spray  2 Spray Both Nostrils DAILY PRN    hydrOXYzine HCl (ATARAX) tablet 25 mg  25 mg Oral TID PRN    isosorbide mononitrate ER (IMDUR) tablet 120 mg  120 mg Oral DAILY    pantoprazole (PROTONIX) tablet 40 mg  40 mg Oral ACB    polyethylene glycol (MIRALAX) packet 17 g  17 g Oral DAILY    senna-docusate (PERICOLACE) 8.6-50 mg per tablet 1 Tab  1 Tab Oral DAILY    sodium chloride (NS) flush 5-10 mL  5-10 mL IntraVENous Q8H    sodium chloride (NS) flush 5-10 mL  5-10 mL IntraVENous PRN    naloxone (NARCAN) injection 0.4 mg  0.4 mg IntraVENous PRN    glucose chewable tablet 16 g  4 Tab Oral PRN    dextrose (D50W) injection syrg 12.5-25 g  12.5-25 g IntraVENous PRN    glucagon (GLUCAGEN) injection 1 mg  1 mg IntraMUSCular PRN    acetaminophen (TYLENOL) tablet 650 mg  650 mg Oral Q4H PRN    diphenhydrAMINE (BENADRYL) capsule 25 mg  25 mg Oral Q4H PRN    insulin lispro (HUMALOG) injection   SubCUTAneous AC&HS       I have reviewed the nurses notes, vitals, problem list, allergy list, medical history, family, social history and medications. REVIEW OF SYMPTOMS      General: Pt denies excessive weight gain or loss. Pt is able to conduct ADL's  HEENT: Denies blurred vision, headaches, hearing loss, epistaxis and difficulty swallowing. Respiratory: Denies cough, congestion, shortness of breath, LARA, wheezing or stridor. Cardiovascular: Denies precordial pain, palpitations, edema or PND  Gastrointestinal: Denies poor appetite, indigestion, abdominal pain or blood in stool  Genitourinary: Denies hematuria, dysuria, increased urinary frequency  Musculoskeletal: Denies joint pain or swelling from muscles or joints  Neurologic: Denies tremor, paresthesias, headache, or sensory motor disturbance  Psychiatric: Denies confusion, insomnia, depression  Integumentray: Denies rash, itching or ulcers. Hematologic: Denies easy bruising, bleeding       PHYSICAL EXAMINATION      Vitals:    06/10/18 0759 06/10/18 0850 06/10/18 0851 06/10/18 1249   BP: 192/87 164/68 160/71 188/77   Pulse: 72   72   Resp: 16   16   Temp: 98.2 °F (36.8 °C)   98.4 °F (36.9 °C)   SpO2: 94%   97%   Weight:       Height:         General: Well developed, in no acute distress. HEENT: No jaundice, oral mucosa moist, no oral ulcers  Neck: Supple, no stiffness, no lymphadenopathy, supple  Heart:  Normal S1/S2 negative S3 or S4.  Regular, no murmur, gallop or rub, no jugular venous distention  Respiratory: Clear bilaterally x 4, no wheezing or rales  Abdomen:   Soft, non-tender, bowel sounds are active.   Extremities:  No edema, normal cap refill, no cyanosis. Musculoskeletal: No clubbing, no deformities  Neuro: A&Ox3, speech clear, gait stable, cooperative, no focal neurologic deficits  Skin: Skin color is normal. No rashes or lesions. Non diaphoretic, moist.  Vascular: 2+ pulses symmetric in all extremities       DIAGNOSTIC DATA      TELEMETRY: Sinus rhythm       LABORATORY DATA      Lab Results   Component Value Date/Time    WBC 9.0 06/10/2018 04:53 AM    Hemoglobin (POC) 9.9 (L) 07/21/2013 09:51 PM    HGB 8.5 (L) 06/10/2018 04:53 AM    Hematocrit (POC) 29 (L) 07/21/2013 09:51 PM    HCT 26.7 (L) 06/10/2018 04:53 AM    PLATELET 280 02/69/1593 04:53 AM    MCV 93.7 06/10/2018 04:53 AM      Lab Results   Component Value Date/Time    Sodium 136 06/10/2018 04:53 AM    Potassium 4.6 06/10/2018 04:53 AM    Chloride 102 06/10/2018 04:53 AM    CO2 25 06/10/2018 04:53 AM    Anion gap 9 06/10/2018 04:53 AM    Glucose 148 (H) 06/10/2018 04:53 AM    BUN 98 (H) 06/10/2018 04:53 AM    Creatinine 4.14 (H) 06/10/2018 04:53 AM    BUN/Creatinine ratio 24 (H) 06/10/2018 04:53 AM    GFR est AA 13 (L) 06/10/2018 04:53 AM    GFR est non-AA 11 (L) 06/10/2018 04:53 AM    Calcium 9.4 06/10/2018 04:53 AM    Bilirubin, total 0.5 11/21/2017 12:40 PM    AST (SGOT) 24 11/21/2017 12:40 PM    Alk.  phosphatase 94 11/21/2017 12:40 PM    Protein, total 6.8 11/21/2017 12:40 PM    Albumin 3.5 06/10/2018 04:53 AM    Globulin 3.6 11/21/2017 12:40 PM    A-G Ratio 0.9 (L) 11/21/2017 12:40 PM    ALT (SGPT) 19 11/21/2017 12:40 PM           ASSESSMENT      1)Chronic recurrent anginal chest pain, exertional and non exertional.  - CAD with 1v disease/RCA  -cont ASA/statin/BB  - LHC cath unable to be done d/t respiratory status.   -on high dose of imdur       2) Chronic HFpEF, class III, s/p CardioMEMS for PA monitoring:   - RHC showed moderate-severe PA HTN, post capillary. Marked elevation in biV filling pressures. Cardiomem re- calibrated   -continue IV bumex 2 mg BID, monitor creatinine closely       3) CKD  - creatinine stable, will continue aggressive diuresis, metolazone added per nephrology   - Nephrology following, appreciate recommendations  - she does have an AV fistula      4) Chronic severe anemia, refractory to Procrit:   - hematology following   S/p BMB- results pending   H/H stable      5) Chronic anticoagulation for DVT   - s/p Vit K and FFP (prior to BM biopsy)   - coumadin restarted. INr sub therapeutic       6) Hypertension: Uncontrolled. Will add clonidine 0.1 mg daily and adjust as necessary.     [facility] MD Britta  Cardiac Electrophysiology / Cardiology    [address] [place] [address], [address] [facility], [place] [address]  Boy Gutierrez     Naty Cardoza  [phone] / [phone] Fax   [phone] / [phone] Fax

## 2018-06-10 NOTE — PROGRESS NOTES
[facility] Dosing Services: 6/10/18  Consult for Warfarin Dosing by Pharmacy by JUANCHO Rodriguez NP  Consult provided for this 63 y.o. female for indication of Hx of DVT (6/2017)  Day of Therapy: resumed from home. (PTA: Warfarin 2 mg on Mon/Fri and 3 mg on Tue/Wed/Thur/Sat/Sun)   Dose to achieve an INR goal of 2-3     Order entered for  Warfarin  2 (mg) ordered to be given today at 18:00    Significant drug interactions: Allopurinol  Previous dose given 3 mg   PT/INR Lab Results   Component Value Date/Time    INR 2.6 (H) 06/10/2018 04:53 AM      Platelets Lab Results   Component Value Date/Time    PLATELET 288 64/75/4265 04:53 AM      H/H Lab Results   Component Value Date/Time    HGB 8.5 (L) 06/10/2018 04:53 AM        Pharmacy to follow daily and will provide subsequent Warfarin dosing based on clinical status.   Javier Manjarrez)  Contact information [phone]

## 2018-06-10 NOTE — PROGRESS NOTES
Ezio Craig [facility] [address]  [address] [facility], [address] [place] [address]  [phone]      Medical Progress Note      NAME: Felix Ren   :  1955  MRM:  [id number]    Date/Time: 6/10/2018  7:18 AM       Assessment and Plan:     Chronic respiratory failure with hypoxia / COPD, severe / ILD (interstitial lung disease) / Pulmonary HTN - POA, mod to severe plm HTN laquita contributes to her LARA and hypoxia.  No curative treatment, just palliative treatment with diuretics.  Likely to progress. Palliative care consulted.  Continue Oxygen as needed.  CT  showed multiple complex abnormalities, which s parainfluenza vs progressive changes.  No fever, no leukocytosis.     Parainfluenza virus - Tested positive on resp screen. Supportive care. Pulmonary consulted and placed her on doxycycline. I would not have.      Chronic diastolic heart failure - POA, driven by ILD, pulm HTN, progression to cor pulmonale is in her future.  MEMs place to monitor pulm HTN. Continue bumex at higher dose, added metolazone, strict I's and O's, daily weights, low salt      DM (diabetes mellitus), type 2 with renal, vascular and neurological complications - Diabetic diet and counseling.  SSI per protocol. Not on home meds.  ESRD has likely potentiated her endogenous insulin. A1c Useless in setting of anemia.       CKD (chronic kidney disease) stage 4 - POA and a bit worse after cath, now slightly better.  Nephrology consulted. Alanna Daft to progress to ESRD. Kobien President is a poor candidate for HD.   She has an AV fistula      CAD (coronary Artery Disease) Native Artery / HTN (hypertension) / Chest pain / Dyspnea - Pain chronic, unlikely AMI.  Cath failed. Continue ASA, statin, BB, Imdur, as medical management.  No further plans for cath or workup.      Nausea and anorexia / Gastroparesis / Esophageal reflux - PPI.  Better this AM.  May be due to poor GI circulation resulting from excess diuresis.  Start PPI.  Clear liquid diet.  Monitor.  Normal lactic acid.      Morbid obesity / JASEN on CPAP - continue CPAP, advise weight loss       Normocytic anemia - POA, heme/onc consulted. Dorothy Neal just chronic disease. s/p BMB, pathology negative. EPO per renal.      Hx of deep venous thrombosis / Warfarin anticoagulation - Coumadin was held for cath, now back. diagnosed with DVT in 6/2017. Does not need bridge.      Hypomagnesemia - Replete and follow.      Gout - stable on allopurinol        Subjective:     Chief Complaint:  Breathing is only marginally better    ROS:  (bold if positive, if negative)    SOB/LARA  Tolerating some PT  Tolerating Diet        Objective:     Last 24hrs VS reviewed since prior progress note.  Most recent are:    Visit Vitals    /77 (BP 1 Location: Right arm, BP Patient Position: At rest)    Pulse 68    Temp 98.2 °F (36.8 °C)    Resp 20    Ht 5' 10\" (1.778 m)    Wt 146 kg (321 lb 14 oz)    SpO2 98%    BMI 46.18 kg/m2     SpO2 Readings from Last 6 Encounters:   06/10/18 98%   05/15/18 91%   04/03/18 96%   03/20/18 98%   01/24/18 98%   01/10/18 95%    O2 Flow Rate (L/min): 2 l/min     Intake/Output Summary (Last 24 hours) at 06/10/18 0718  Last data filed at 06/10/18 0445   Gross per 24 hour   Intake             1300 ml   Output              400 ml   Net              900 ml        Physical Exam:    Gen:  Morbid obese, in no acute distress  HEENT:  Pink conjunctivae, PERRL, hearing intact to voice, moist mucous membranes  Neck:  Supple, without masses, thyroid non-tender  Resp:  No accessory muscle use, distant breath sounds without wheezes rales or rhonchi  Card:  No murmurs, distant S1, S2 without thrills, bruits, 2+ peripheral edema  Abd:  Soft, non-tender, non-distended, distant bowel sounds are present, no mass  Lymph:  No cervical or inguinal adenopathy  Musc:  No cyanosis or clubbing  Skin:  No rashes or ulcers, skin turgor is good  Neuro:  Cranial nerves are grossly intact, general motor weakness, follows commands appropriately  Psych:  Poor insight, oriented to person, place and time, alert    Telemetry reviewed:   normal sinus rhythm  __________________________________________________________________  Medications Reviewed: (see below)  Medications:     Current Facility-Administered Medications   Medication Dose Route Frequency    albuterol-ipratropium (DUO-NEB) 2.5 MG-0.5 MG/3 ML  3 mL Nebulization Q8H RT    labetalol (NORMODYNE;TRANDATE) injection 20 mg  20 mg IntraVENous Q4H PRN    hydrALAZINE (APRESOLINE) tablet 100 mg  100 mg Oral TID    doxycycline (VIBRA-TABS) tablet 100 mg  100 mg Oral Q12H    predniSONE (DELTASONE) tablet 40 mg  40 mg Oral DAILY WITH BREAKFAST    sodium chloride (OCEAN) 0.65 % nasal squeeze bottle 2 Spray  2 Spray Both Nostrils Q2H PRN    HYDROmorphone (DILAUDID) syringe 0.5 mg  0.5 mg IntraVENous Q4H PRN    metOLazone (ZAROXOLYN) tablet 5 mg  5 mg Oral DAILY    epoetin charlie (EPOGEN;PROCRIT) injection 10,000 Units  10,000 Units SubCUTAneous Once per day on Thu Sat    ondansetron (ZOFRAN ODT) tablet 8 mg  8 mg Oral Q8H PRN    magnesium oxide (MAG-OX) tablet 400 mg  400 mg Oral BID    lidocaine (XYLOCAINE) 10 mg/mL (1 %) injection 10-20 mL  10-20 mL IntraDERMal Multiple    sodium chloride (NS) flush 5-10 mL  5-10 mL IntraVENous Q8H    sodium chloride (NS) flush 5-10 mL  5-10 mL IntraVENous PRN    bumetanide (BUMEX) injection 2 mg  2 mg IntraVENous BID    sodium chloride (OCEAN) 0.65 % nasal squeeze bottle 2 Spray  2 Spray Both Nostrils Q2H PRN    0.9% sodium chloride infusion 250 mL  250 mL IntraVENous PRN    nitroglycerin (NITROSTAT) tablet 0.4 mg  0.4 mg SubLINGual PRN    oxyCODONE-acetaminophen (PERCOCET 7.5) 7.5-325 mg per tablet 1 Tab  1 Tab Oral Q6H PRN    Warfarin: pharmacy to dose    Other Rx Dosing/Monitoring    amLODIPine (NORVASC) tablet 10 mg  10 mg Oral DAILY    albuterol (PROVENTIL VENTOLIN) nebulizer solution 2.5 mg  2.5 mg Nebulization Q6H PRN    allopurinol (ZYLOPRIM) tablet 100 mg  100 mg Oral DAILY    aspirin delayed-release tablet 81 mg  81 mg Oral DAILY    atorvastatin (LIPITOR) tablet 80 mg  80 mg Oral QHS    calcitRIOL (ROCALTROL) capsule 0.25 mcg  0.25 mcg Oral Q M, W, F & SAT    carvedilol (COREG) tablet 25 mg  25 mg Oral BID WITH MEALS    ergocalciferol (ERGOCALCIFEROL) capsule 50,000 Units  50,000 Units Oral every Tuesday    fluticasone (FLONASE) 50 mcg/actuation nasal spray 2 Spray  2 Spray Both Nostrils DAILY PRN    hydrOXYzine HCl (ATARAX) tablet 25 mg  25 mg Oral TID PRN    isosorbide mononitrate ER (IMDUR) tablet 120 mg  120 mg Oral DAILY    pantoprazole (PROTONIX) tablet 40 mg  40 mg Oral ACB    polyethylene glycol (MIRALAX) packet 17 g  17 g Oral DAILY    senna-docusate (PERICOLACE) 8.6-50 mg per tablet 1 Tab  1 Tab Oral DAILY    sodium chloride (NS) flush 5-10 mL  5-10 mL IntraVENous Q8H    sodium chloride (NS) flush 5-10 mL  5-10 mL IntraVENous PRN    naloxone (NARCAN) injection 0.4 mg  0.4 mg IntraVENous PRN    glucose chewable tablet 16 g  4 Tab Oral PRN    dextrose (D50W) injection syrg 12.5-25 g  12.5-25 g IntraVENous PRN    glucagon (GLUCAGEN) injection 1 mg  1 mg IntraMUSCular PRN    acetaminophen (TYLENOL) tablet 650 mg  650 mg Oral Q4H PRN    diphenhydrAMINE (BENADRYL) capsule 25 mg  25 mg Oral Q4H PRN    insulin lispro (HUMALOG) injection   SubCUTAneous AC&HS        Lab Data Reviewed: (see below)  Lab Review:     Recent Labs      06/10/18   0453  06/08/18   0105   WBC  9.0  10.5   HGB  8.5*  8.7*   HCT  26.7*  27.6*   PLT  180  166     Recent Labs      06/10/18   0453  06/09/18   0840  06/09/18   0452  06/08/18   0105   NA  136  136   --   140   K  4.6  4.5   --   4.6   CL  102  102   --   105   CO2  25  25   --   25   GLU  148*  200*   --   157*   BUN  98*  84*   --   82*   CREA  4.14*  4.22*   --   3.99*   CA  9.4  9.3   --   9.2   MG  2.2   --    --   2.0   PHOS  4.6  4.7   --   4.8*   ALB  3.5  3.3*   -- --    INR  2.6*   --   2.1*  1.5*     Lab Results   Component Value Date/Time    Glucose (POC) 158 (H) 06/10/2018 07:05 AM    Glucose (POC) 332 (H) 06/09/2018 08:50 PM    Glucose (POC) 234 (H) 06/09/2018 04:27 PM    Glucose (POC) 205 (H) 06/09/2018 11:20 AM    Glucose (POC) 158 (H) 06/09/2018 07:35 AM     No results for input(s): PH, PCO2, PO2, HCO3, FIO2 in the last 72 hours. Recent Labs      06/10/18   0453  06/09/18   0452  06/08/18   0105   INR  2.6*  2.1*  1.5*     All Micro Results     Procedure Component Value Units Date/Time    RYASHAWN Mccloudyara Millerfartun, UR/CSF [[id number]] Collected:  06/08/18 5283    Order Status:  Completed Specimen:  Other from Miscellaneous sample Updated:  06/09/18 1236     Source URINE        Specimen Urine     Streptococcus pneumoniae Ag NEGATIVE         Fluid culture Not Indicated     Organism ID Not indicated. Please note Comment         (NOTE)  College [employer] standards require a culture to be  performed on CSF specimens submitted for bacterial antigen testing. (CAP [id number]) Urine specimens will not be cultured.   Performed At: [address] [id number]  Mishel Villasenor MD VA:[id number]         RESPIRATORY Deldara Nelson [[id number]]  (Abnormal) Collected:  06/08/18 0824    Order Status:  Completed Specimen:  Nasopharyngeal Updated:  06/08/18 1247     Adenovirus NOT DETECTED        Coronavirus 229E NOT DETECTED        Coronavirus HKU1 NOT DETECTED        Coronavirus CVNL63 NOT DETECTED        Coronavirus OC43 NOT DETECTED        Metapneumovirus NOT DETECTED        Rhinovirus and Enterovirus NOT DETECTED        Influenza A NOT DETECTED        Influenza A, subtype H1 NOT DETECTED        Influenza A, subtype H3 NOT DETECTED        INFLUENZA A H1N1 PCR NOT DETECTED        Influenza B NOT DETECTED        Parainfluenza 1 NOT DETECTED        Parainfluenza 2 NOT DETECTED        Parainfluenza 3 DETECTED (A)        Parainfluenza virus 4 NOT DETECTED        RSV by PCR NOT DETECTED        Bordetella pertussis - PCR NOT DETECTED        Chlamydophila pneumoniae DNA, QL, PCR NOT DETECTED        Mycoplasma pneumoniae DNA, QL, PCR NOT DETECTED       URINE CULTURE HOLD SAMPLE [[id number]] Collected:  06/08/18 0826    Order Status:  Completed Specimen:  Serum Updated:  06/08/18 0835     Urine culture hold         URINE ON HOLD IN MICROBIOLOGY DEPT FOR 3 DAYS. IF UNPRESERVED URINE IS SUBMITTED, IT CANNOT BE USED FOR ADDITIONAL TESTING AFTER 24 HRS, RECOLLECTION WILL BE REQUIRED. I have reviewed notes of prior 24hr.     Other pertinent lab: none    Total time spent with patient: [address] [place] [address] discussed with: Patient, Nursing Staff and >50% of time spent in counseling and coordination of care    Discussed:  Care Plan    Prophylaxis:  H2B/PPI    Disposition:  Home w/Family           ___________________________________________________    Attending Physician: Jacy Kaye MD

## 2018-06-10 NOTE — PROGRESS NOTES
SHIFT CHANGE:  1930 Bedside and Verbal shift change report given to Gemma EVANS (oncoming nurse) by Jade Rodriguez (offgoing nurse). Report included the following information SBAR, Kardex, MAR and Recent Results. SHIFT SUMMARY:  2039  Dilaudid given as prescribed for pain control. Cade Smith to report BP in 971'E systolic. Patient had 100mg hydralazine PO at 2217. No PRN orders for elevated blood pressure. Orders to follow. Labetalol given as prescribed. END OF SHIFT REPORT:  0730  Bedside and Verbal shift change report given to Jade Rodriguez (oncoming nurse) by Hillary Toth (offgoing nurse). Report included the following information SBAR, Kardex, Intake/Output and MAR.

## 2018-06-11 LAB
ALBUMIN SERPL-MCNC: 3.6 G/DL (ref 3.5–5)
ANION GAP SERPL CALC-SCNC: 10 MMOL/L (ref 5–15)
BUN SERPL-MCNC: 102 MG/DL (ref 6–20)
BUN/CREAT SERPL: 22 (ref 12–20)
CALCIUM SERPL-MCNC: 9.2 MG/DL (ref 8.5–10.1)
CHLORIDE SERPL-SCNC: 100 MMOL/L (ref 97–108)
CO2 SERPL-SCNC: 25 MMOL/L (ref 21–32)
CREAT SERPL-MCNC: 4.57 MG/DL (ref 0.55–1.02)
GLUCOSE BLD STRIP.AUTO-MCNC: 193 MG/DL (ref 65–100)
GLUCOSE BLD STRIP.AUTO-MCNC: 196 MG/DL (ref 65–100)
GLUCOSE BLD STRIP.AUTO-MCNC: 201 MG/DL (ref 65–100)
GLUCOSE BLD STRIP.AUTO-MCNC: 350 MG/DL (ref 65–100)
GLUCOSE SERPL-MCNC: 192 MG/DL (ref 65–100)
INR PPP: 2.5 (ref 0.9–1.1)
PHOSPHATE SERPL-MCNC: 4.9 MG/DL (ref 2.6–4.7)
POTASSIUM SERPL-SCNC: 4.7 MMOL/L (ref 3.5–5.1)
PROTHROMBIN TIME: 24.8 SEC (ref 9–11.1)
SERVICE CMNT-IMP: ABNORMAL
SODIUM SERPL-SCNC: 135 MMOL/L (ref 136–145)

## 2018-06-11 PROCEDURE — 74011250636 HC RX REV CODE- 250/636: Performed by: INTERNAL MEDICINE

## 2018-06-11 PROCEDURE — 74011250637 HC RX REV CODE- 250/637: Performed by: NURSE PRACTITIONER

## 2018-06-11 PROCEDURE — 74011000250 HC RX REV CODE- 250: Performed by: NURSE PRACTITIONER

## 2018-06-11 PROCEDURE — 74011250637 HC RX REV CODE- 250/637: Performed by: INTERNAL MEDICINE

## 2018-06-11 PROCEDURE — 90935 HEMODIALYSIS ONE EVALUATION: CPT

## 2018-06-11 PROCEDURE — 74011636637 HC RX REV CODE- 636/637: Performed by: PHYSICIAN ASSISTANT

## 2018-06-11 PROCEDURE — 97116 GAIT TRAINING THERAPY: CPT

## 2018-06-11 PROCEDURE — 85610 PROTHROMBIN TIME: CPT | Performed by: NURSE PRACTITIONER

## 2018-06-11 PROCEDURE — 5A1D70Z PERFORMANCE OF URINARY FILTRATION, INTERMITTENT, LESS THAN 6 HOURS PER DAY: ICD-10-PCS | Performed by: INTERNAL MEDICINE

## 2018-06-11 PROCEDURE — 74011000250 HC RX REV CODE- 250: Performed by: INTERNAL MEDICINE

## 2018-06-11 PROCEDURE — 74011250637 HC RX REV CODE- 250/637: Performed by: PHYSICIAN ASSISTANT

## 2018-06-11 PROCEDURE — 82962 GLUCOSE BLOOD TEST: CPT

## 2018-06-11 PROCEDURE — 94660 CPAP INITIATION&MGMT: CPT

## 2018-06-11 PROCEDURE — 77010033678 HC OXYGEN DAILY

## 2018-06-11 PROCEDURE — 80069 RENAL FUNCTION PANEL: CPT | Performed by: INTERNAL MEDICINE

## 2018-06-11 PROCEDURE — 65660000000 HC RM CCU STEPDOWN

## 2018-06-11 PROCEDURE — 36415 COLL VENOUS BLD VENIPUNCTURE: CPT | Performed by: NURSE PRACTITIONER

## 2018-06-11 PROCEDURE — 74011636637 HC RX REV CODE- 636/637: Performed by: INTERNAL MEDICINE

## 2018-06-11 PROCEDURE — 94640 AIRWAY INHALATION TREATMENT: CPT

## 2018-06-11 RX ORDER — PROCHLORPERAZINE EDISYLATE 5 MG/ML
5 INJECTION INTRAMUSCULAR; INTRAVENOUS
Status: DISCONTINUED | OUTPATIENT
Start: 2018-06-11 | End: 2018-06-15 | Stop reason: HOSPADM

## 2018-06-11 RX ORDER — WARFARIN 2 MG/1
2 TABLET ORAL ONCE
Status: COMPLETED | OUTPATIENT
Start: 2018-06-11 | End: 2018-06-11

## 2018-06-11 RX ORDER — PREDNISONE 20 MG/1
20 TABLET ORAL
Status: DISPENSED | OUTPATIENT
Start: 2018-06-12 | End: 2018-06-15

## 2018-06-11 RX ADMIN — Medication 10 ML: at 08:22

## 2018-06-11 RX ADMIN — Medication 10 ML: at 17:03

## 2018-06-11 RX ADMIN — HYDROMORPHONE HYDROCHLORIDE 0.5 MG: 1 INJECTION, SOLUTION INTRAMUSCULAR; INTRAVENOUS; SUBCUTANEOUS at 16:58

## 2018-06-11 RX ADMIN — HYDRALAZINE HYDROCHLORIDE 100 MG: 25 TABLET ORAL at 16:58

## 2018-06-11 RX ADMIN — HYDRALAZINE HYDROCHLORIDE 100 MG: 25 TABLET ORAL at 09:24

## 2018-06-11 RX ADMIN — POLYETHYLENE GLYCOL (3350) 17 G: 17 POWDER, FOR SOLUTION ORAL at 09:25

## 2018-06-11 RX ADMIN — ISOSORBIDE MONONITRATE 120 MG: 60 TABLET ORAL at 09:24

## 2018-06-11 RX ADMIN — DOXYCYCLINE HYCLATE 100 MG: 100 TABLET, COATED ORAL at 12:51

## 2018-06-11 RX ADMIN — Medication 10 ML: at 16:58

## 2018-06-11 RX ADMIN — METOLAZONE 5 MG: 5 TABLET ORAL at 09:24

## 2018-06-11 RX ADMIN — Medication 400 MG: at 18:31

## 2018-06-11 RX ADMIN — INSULIN LISPRO 3 UNITS: 100 INJECTION, SOLUTION INTRAVENOUS; SUBCUTANEOUS at 12:50

## 2018-06-11 RX ADMIN — BUMETANIDE 2 MG: 0.25 INJECTION INTRAMUSCULAR; INTRAVENOUS at 09:23

## 2018-06-11 RX ADMIN — STANDARDIZED SENNA CONCENTRATE AND DOCUSATE SODIUM 1 TABLET: 8.6; 5 TABLET, FILM COATED ORAL at 09:24

## 2018-06-11 RX ADMIN — Medication 400 MG: at 09:23

## 2018-06-11 RX ADMIN — IPRATROPIUM BROMIDE AND ALBUTEROL SULFATE 3 ML: .5; 3 SOLUTION RESPIRATORY (INHALATION) at 15:47

## 2018-06-11 RX ADMIN — INSULIN LISPRO 4 UNITS: 100 INJECTION, SOLUTION INTRAVENOUS; SUBCUTANEOUS at 08:18

## 2018-06-11 RX ADMIN — PREDNISONE 40 MG: 20 TABLET ORAL at 09:23

## 2018-06-11 RX ADMIN — ONDANSETRON 8 MG: 4 TABLET, ORALLY DISINTEGRATING ORAL at 08:18

## 2018-06-11 RX ADMIN — CARVEDILOL 25 MG: 12.5 TABLET, FILM COATED ORAL at 09:23

## 2018-06-11 RX ADMIN — Medication 10 ML: at 22:00

## 2018-06-11 RX ADMIN — INSULIN LISPRO 12 UNITS: 100 INJECTION, SOLUTION INTRAVENOUS; SUBCUTANEOUS at 16:58

## 2018-06-11 RX ADMIN — HYDROMORPHONE HYDROCHLORIDE 0.5 MG: 1 INJECTION, SOLUTION INTRAMUSCULAR; INTRAVENOUS; SUBCUTANEOUS at 19:59

## 2018-06-11 RX ADMIN — PROCHLORPERAZINE EDISYLATE 5 MG: 5 INJECTION INTRAMUSCULAR; INTRAVENOUS at 20:44

## 2018-06-11 RX ADMIN — AMLODIPINE BESYLATE 10 MG: 5 TABLET ORAL at 09:24

## 2018-06-11 RX ADMIN — CARVEDILOL 25 MG: 12.5 TABLET, FILM COATED ORAL at 16:58

## 2018-06-11 RX ADMIN — ALLOPURINOL 100 MG: 100 TABLET ORAL at 09:24

## 2018-06-11 RX ADMIN — ASPIRIN 81 MG: 81 TABLET, COATED ORAL at 09:24

## 2018-06-11 RX ADMIN — ONDANSETRON 8 MG: 4 TABLET, ORALLY DISINTEGRATING ORAL at 16:57

## 2018-06-11 RX ADMIN — CALCITRIOL 0.25 MCG: 0.25 CAPSULE, LIQUID FILLED ORAL at 09:24

## 2018-06-11 RX ADMIN — PANTOPRAZOLE SODIUM 40 MG: 40 TABLET, DELAYED RELEASE ORAL at 08:18

## 2018-06-11 RX ADMIN — WARFARIN SODIUM 2 MG: 2 TABLET ORAL at 18:31

## 2018-06-11 NOTE — PROGRESS NOTES
[address] [facility] Sands  YOB: 1955          Assessment & Plan:   CKD 4  · Cr cont to worsen, BUN is worse  · DW her to start HD:She wants to wait one more day  · Nausea: ? Is this uremia or gastritis  · SOB: inflammation vs fluid  · Daily wts: 145   from 151 lb since we have escalated diuresis  · Has AVF which is ready for use    CP  · On oxygen  · Cath 6 5 18:rev    HTN   · 140-160S:Amlodipine,Corge,Loops,Hydralazine    SHPT   · Calcitriol    Anemia of CKD   · Resume 2 times a week EPO here, we will debate changing Epo to Aranesp out pt  · S/p BMB  : normal       Subjective:   CC: f/u CKD  HPI:   CKD: cr slightly high, still with nauseas  Wt down again to 145  HTN  Is acceptale  Fair urine but no accuarate I/O  Anemia severe and on BIW Epo  Wt better  ROS: no n/v/ neg for 10 sys except in HPI  Current Facility-Administered Medications   Medication Dose Route Frequency    albuterol-ipratropium (DUO-NEB) 2.5 MG-0.5 MG/3 ML  3 mL Nebulization Q8H RT    labetalol (NORMODYNE;TRANDATE) injection 20 mg  20 mg IntraVENous Q4H PRN    hydrALAZINE (APRESOLINE) tablet 100 mg  100 mg Oral TID    doxycycline (VIBRA-TABS) tablet 100 mg  100 mg Oral Q12H    predniSONE (DELTASONE) tablet 40 mg  40 mg Oral DAILY WITH BREAKFAST    sodium chloride (OCEAN) 0.65 % nasal squeeze bottle 2 Spray  2 Spray Both Nostrils Q2H PRN    HYDROmorphone (DILAUDID) syringe 0.5 mg  0.5 mg IntraVENous Q4H PRN    metOLazone (ZAROXOLYN) tablet 5 mg  5 mg Oral DAILY    epoetin charlie (EPOGEN;PROCRIT) injection 10,000 Units  10,000 Units SubCUTAneous Once per day on Thu Sat    ondansetron (ZOFRAN ODT) tablet 8 mg  8 mg Oral Q8H PRN    magnesium oxide (MAG-OX) tablet 400 mg  400 mg Oral BID    lidocaine (XYLOCAINE) 10 mg/mL (1 %) injection 10-20 mL  10-20 mL IntraDERMal Multiple    sodium chloride (NS) flush 5-10 mL  5-10 mL IntraVENous Q8H    sodium chloride (NS) flush 5-10 mL  5-10 mL IntraVENous PRN    bumetanide (BUMEX) injection 2 mg  2 mg IntraVENous BID    sodium chloride (OCEAN) 0.65 % nasal squeeze bottle 2 Spray  2 Spray Both Nostrils Q2H PRN    0.9% sodium chloride infusion 250 mL  250 mL IntraVENous PRN    nitroglycerin (NITROSTAT) tablet 0.4 mg  0.4 mg SubLINGual PRN    oxyCODONE-acetaminophen (PERCOCET 7.5) 7.5-325 mg per tablet 1 Tab  1 Tab Oral Q6H PRN    Warfarin: pharmacy to dose    Other Rx Dosing/Monitoring    amLODIPine (NORVASC) tablet 10 mg  10 mg Oral DAILY    albuterol (PROVENTIL VENTOLIN) nebulizer solution 2.5 mg  2.5 mg Nebulization Q6H PRN    allopurinol (ZYLOPRIM) tablet 100 mg  100 mg Oral DAILY    aspirin delayed-release tablet 81 mg  81 mg Oral DAILY    atorvastatin (LIPITOR) tablet 80 mg  80 mg Oral QHS    calcitRIOL (ROCALTROL) capsule 0.25 mcg  0.25 mcg Oral Q M, W, F & SAT    carvedilol (COREG) tablet 25 mg  25 mg Oral BID WITH MEALS    ergocalciferol (ERGOCALCIFEROL) capsule 50,000 Units  50,000 Units Oral every Tuesday    fluticasone (FLONASE) 50 mcg/actuation nasal spray 2 Spray  2 Spray Both Nostrils DAILY PRN    hydrOXYzine HCl (ATARAX) tablet 25 mg  25 mg Oral TID PRN    isosorbide mononitrate ER (IMDUR) tablet 120 mg  120 mg Oral DAILY    pantoprazole (PROTONIX) tablet 40 mg  40 mg Oral ACB    polyethylene glycol (MIRALAX) packet 17 g  17 g Oral DAILY    senna-docusate (PERICOLACE) 8.6-50 mg per tablet 1 Tab  1 Tab Oral DAILY    sodium chloride (NS) flush 5-10 mL  5-10 mL IntraVENous Q8H    sodium chloride (NS) flush 5-10 mL  5-10 mL IntraVENous PRN    naloxone (NARCAN) injection 0.4 mg  0.4 mg IntraVENous PRN    glucose chewable tablet 16 g  4 Tab Oral PRN    dextrose (D50W) injection syrg 12.5-25 g  12.5-25 g IntraVENous PRN    glucagon (GLUCAGEN) injection 1 mg  1 mg IntraMUSCular PRN    acetaminophen (TYLENOL) tablet 650 mg  650 mg Oral Q4H PRN    diphenhydrAMINE (BENADRYL) capsule 25 mg  25 mg Oral Q4H PRN    insulin lispro (HUMALOG) injection   SubCUTAneous AC&HS          Objective:     Vitals:  Blood pressure 151/83, pulse 72, temperature 98 °F (36.7 °C), resp. rate 10, height 5' 10\" (1.778 m), weight 145.5 kg (320 lb 12.3 oz), SpO2 100 %. Temp (24hrs), Av.2 °F (36.8 °C), Min:97.9 °F (36.6 °C), Max:98.4 °F (36.9 °C)      Intake and Output:      1901 -  0700  In: 36 [P.O.:820]  Out: -     Physical Exam:               GENERAL ASSESSMENT: NAD  CHEST: CTA  HEART: S1S2  ABDOMEN: Soft,NT  SKIN DRY  JT: No acute findings  EXTREMITY: trace EDEMA          Data Review      No results for input(s): TNIPOC in the last 72 hours. No lab exists for component: ITNL   No results for input(s): CPK, CKMB, TROIQ in the last 72 hours. Recent Labs      06/11/18   0407  06/10/18   0453  06/09/18   0840   NA  135*  136  136   K  4.7  4.6  4.5   CL  100  102  102   CO2  25  25  25   BUN  102*  98*  84*   CREA  4.57*  4.14*  4.22*   GLU  192*  148*  200*   PHOS  4.9*  4.6  4.7   MG   --   2.2   --    CA  9.2  9.4  9.3   ALB  3.6  3.5  3.3*   WBC   --   9.0   --    HGB   --   8.5*   --    HCT   --   26.7*   --    PLT   --   180   --       Recent Labs      187  06/10/18   0453  18   0452   INR  2.5*  2.6*  2.1*   PTP  24.8*  26.2*  21.0*     Needs: urine analysis, urine sodium, protein and creatinine  Lab Results   Component Value Date/Time    Sodium urine, random 25 11/10/2014 12:40 PM    Creatinine, urine 145.29 2017 05:01 AM         : Katarina Yao MD  2018        [facility]:  [email or web address]  Noemi Khan office:  [address] [place] [address], [address] [place] [address],[address]  [place], [address] [facility]  Phone: [phone]  Fax :     [phone]    Nani office:  [address] [facility]  Krystle Cardoza  Phone - [phone]  Fax - [phone]

## 2018-06-11 NOTE — PROGRESS NOTES
Patient complaining of nausea this morning and after discussion is relating it to the fact that she took a dose of dilaudid for pain last night. Discussed with the patient that she may consider the repercussions of her high fat diet and that this may be contributing to her current misery but patient denies any correlation. Patient stated \"well I have to eat something\". Discussed her anxiety related to potential hemodialysis initiation. Patient stated that she has had several family members that  after receiving dialysis. Discussed with her that there are several co mobities involved and that hemodialysis may actually improve her quality of life so that she could eat a healthier diet and live a healthier lifestyle. Patient voiced understanding but this nurse will continue to address issues as they arise. [phone] call out to Dr. Roverto Crews to inform him that the patient is willing to start dialysis and would like it initiated ASAP.

## 2018-06-11 NOTE — PROGRESS NOTES
[facility] Dosing Services: 6/11/18    Consult for Warfarin Dosing by Pharmacy by JUANCHO Yanez NP  Consult provided for this 63 y.o.  female , for indication of Hx of DVT (6/2017)  PTA Dose: Warfarin 2 mg on Mon/Fri and 3 mg on Tue/Wed/Thur/Sat/Sun  Dose to achieve an INR goal of 2-3    Order entered for  Warfarin  2 (mg) ordered to be given today at 18:00. Significant drug interactions: allopurinol  Previous dose given 2mg   PT/INR Lab Results   Component Value Date/Time    INR 2.5 (H) 06/11/2018 04:07 AM      Platelets Lab Results   Component Value Date/Time    PLATELET 929 29/86/8389 04:53 AM      H/H Lab Results   Component Value Date/Time    HGB 8.5 (L) 06/10/2018 04:53 AM        Pharmacy to follow daily and will provide subsequent Warfarin dosing based on clinical status. DEBORAH Lee  Berny Hugo [facility] 23 [phone]

## 2018-06-11 NOTE — PROGRESS NOTES
Cardiology Progress Note                             [address] [facility]. [address], [place], [address] [facility]                                 Phone [phone]; Fax [phone]        2018 9:23 AM     Admit Date:           2018  Admit Diagnosis:  Chest pain  Chest pain  :          1955   MRN:          [id number]   ASSESSMENT/RECOMMENDATION:   1)Chronic recurrent anginal chest pain, exertional and non exertional.  - CAD with 1v disease/RCA  -cont ASA/statin/BB  - C cath unable to be done d/t respiratory status. -on high dose of imdur       2) Chronic HFpEF, class III, s/p CardioMEMS for PA monitoring:   - RHC yesterday showing Moderate-severe PA HTN, post capillary. Marked elevation in biV filling pressures. Cardiomem re- calibrated   - defer diuretics to nephrology       3) CKD  - creatinine stable at 4-4.5 will defer diuretics to nephrology, responding, but still fluid +. She is going to make the decision of HD by tomorrow, she thinks she will proceed  - Nephrology following, appreciate recommendations  - she does have an AV fistula      4) Chronic severe anemia, refractory to Procrit:   - hematology following   S/p BMB- normal   H/H stable      5) Chronic anticoagulation for DVT   - s/p Vit K and FFP (prior to BM biopsy)   - coumadin restarted. INr therapeutic     6) Hypertension: -150s. Hydralazine 100 mg to TID  - coreg/norvasc/imdur.   -will add clonidine if needed    7) Hypo magensium: resolved     8) Chronic hypoxic resp failure: pulmonary following  -CT of chest this AM showing new groundglass areas of opacity and mild consolidation in the right upper lobe and lingula most consistent with superimposed pneumonia or alveolitis on chronic interstitial lung disease.   -prn nebs   -WBC WNL/afebrile       Cardiology Attending:    Patient personally seen and examined.  All the elements of history and examination were personally performed. Assessment and plan was discussed and agree as written above. - Titrate Clonidine. Yuan Sam MD, [facility]                         Last 3 Recorded Weights in this Encounter    06/09/18 0507 06/10/18 0505 06/11/18 0400   Weight: 325 lb 2.9 oz (147.5 kg) 321 lb 14 oz (146 kg) 320 lb 12.3 oz (145.5 kg)         06/09 1901 - 06/11 0700  In: 36 [P.O.:820]  Out: -     [facility] reports congested cough- non productive. SOB unchanged. + nausea.  + cough  No chest pain        Current Facility-Administered Medications   Medication Dose Route Frequency    warfarin (COUMADIN) tablet 2 mg  2 mg Oral ONCE    [START ON 6/12/2018] predniSONE (DELTASONE) tablet 20 mg  20 mg Oral DAILY WITH BREAKFAST    albuterol-ipratropium (DUO-NEB) 2.5 MG-0.5 MG/3 ML  3 mL Nebulization Q8H RT    labetalol (NORMODYNE;TRANDATE) injection 20 mg  20 mg IntraVENous Q4H PRN    hydrALAZINE (APRESOLINE) tablet 100 mg  100 mg Oral TID    doxycycline (VIBRA-TABS) tablet 100 mg  100 mg Oral Q12H    sodium chloride (OCEAN) 0.65 % nasal squeeze bottle 2 Spray  2 Spray Both Nostrils Q2H PRN    HYDROmorphone (DILAUDID) syringe 0.5 mg  0.5 mg IntraVENous Q4H PRN    metOLazone (ZAROXOLYN) tablet 5 mg  5 mg Oral DAILY    epoetin charlie (EPOGEN;PROCRIT) injection 10,000 Units  10,000 Units SubCUTAneous Once per day on Thu Sat    ondansetron (ZOFRAN ODT) tablet 8 mg  8 mg Oral Q8H PRN    magnesium oxide (MAG-OX) tablet 400 mg  400 mg Oral BID    sodium chloride (NS) flush 5-10 mL  5-10 mL IntraVENous Q8H    sodium chloride (NS) flush 5-10 mL  5-10 mL IntraVENous PRN    bumetanide (BUMEX) injection 2 mg  2 mg IntraVENous BID    0.9% sodium chloride infusion 250 mL  250 mL IntraVENous PRN    nitroglycerin (NITROSTAT) tablet 0.4 mg  0.4 mg SubLINGual PRN    oxyCODONE-acetaminophen (PERCOCET 7.5) 7.5-325 mg per tablet 1 Tab  1 Tab Oral Q6H PRN    Warfarin: pharmacy to dose    Other Rx Dosing/Monitoring    amLODIPine (NORVASC) tablet 10 mg  10 mg Oral DAILY    albuterol (PROVENTIL VENTOLIN) nebulizer solution 2.5 mg  2.5 mg Nebulization Q6H PRN    allopurinol (ZYLOPRIM) tablet 100 mg  100 mg Oral DAILY    aspirin delayed-release tablet 81 mg  81 mg Oral DAILY    atorvastatin (LIPITOR) tablet 80 mg  80 mg Oral QHS    calcitRIOL (ROCALTROL) capsule 0.25 mcg  0.25 mcg Oral Q M, W, F & SAT    carvedilol (COREG) tablet 25 mg  25 mg Oral BID WITH MEALS    ergocalciferol (ERGOCALCIFEROL) capsule 50,000 Units  50,000 Units Oral every Tuesday    fluticasone (FLONASE) 50 mcg/actuation nasal spray 2 Spray  2 Spray Both Nostrils DAILY PRN    hydrOXYzine HCl (ATARAX) tablet 25 mg  25 mg Oral TID PRN    isosorbide mononitrate ER (IMDUR) tablet 120 mg  120 mg Oral DAILY    pantoprazole (PROTONIX) tablet 40 mg  40 mg Oral ACB    polyethylene glycol (MIRALAX) packet 17 g  17 g Oral DAILY    senna-docusate (PERICOLACE) 8.6-50 mg per tablet 1 Tab  1 Tab Oral DAILY    sodium chloride (NS) flush 5-10 mL  5-10 mL IntraVENous Q8H    sodium chloride (NS) flush 5-10 mL  5-10 mL IntraVENous PRN    naloxone (NARCAN) injection 0.4 mg  0.4 mg IntraVENous PRN    glucose chewable tablet 16 g  4 Tab Oral PRN    dextrose (D50W) injection syrg 12.5-25 g  12.5-25 g IntraVENous PRN    glucagon (GLUCAGEN) injection 1 mg  1 mg IntraMUSCular PRN    acetaminophen (TYLENOL) tablet 650 mg  650 mg Oral Q4H PRN    diphenhydrAMINE (BENADRYL) capsule 25 mg  25 mg Oral Q4H PRN    insulin lispro (HUMALOG) injection   SubCUTAneous AC&HS      OBJECTIVE               Intake/Output Summary (Last 24 hours) at 06/11/18 0932  Last data filed at 06/10/18 1524   Gross per 24 hour   Intake              480 ml   Output                0 ml   Net              480 ml       Review of Systems - History obtained from the patient AS PER  HPI    Telemetry NSR    PHYSICAL EXAM        Visit Vitals    /68 (BP 1 Location: Right arm, BP Patient Position: At rest;Supine)    Pulse 70    Temp 98 °F (36.7 °C)    Resp 16    Ht 5' 10\" (1.778 m)    Wt 320 lb 12.3 oz (145.5 kg)    SpO2 99%    BMI 46.03 kg/m2       Gen: Well-developed, obese, in no acute distress  alert and oriented x 3  HEENT:  Pink conjunctivae, Hearing grossly normal.No scleral icterus or conjunctival, moist mucous membranes  Neck: Supple, JVD difficult to appreciate  Resp: No accessory muscle use, diminished BS steph rhonchi bilateral  Card: Regular Rate,Rythm, No murmurs, rubs or gallop. No thrills. GI:          soft, non-tender   MSK: No cyanosis or clubbing, good capillary refill  Skin: No rashes or ulcers, no bruising. Neuro:  Cranial nerves are grossly intact, moving all four extremities, no focal deficit, follows commands appropriately  Psych:  Good insight, oriented to person, place and time, alert, Nml Affect  LE: +1 pitting edema       DATA REVIEW            Laboratory and Imaging have been reviewed by me and are notable for  No results for input(s): CPK, CKMB, TROIQ in the last 72 hours.   Recent Labs      06/11/18   0407  06/10/18   0453  06/09/18   0840   NA  135*  136  136   K  4.7  4.6  4.5   CO2  25  25  25   BUN  102*  98*  84*   CREA  4.57*  4.14*  4.22*   GLU  192*  148*  200*   PHOS  4.9*  4.6  4.7   MG   --   2.2   --    WBC   --   9.0   --    HGB   --   8.5*   --    HCT   --   26.7*   --    PLT   --   180   --              Mariza Saxena NP

## 2018-06-11 NOTE — NURSE NAVIGATOR
Cardiomems reading taken:            1500: Met with patient briefly. She was on phone and not able to talk at length. HF educational packet given to her. Patient states she has read this in the past.  Patient states she has decided to start dialysis. Explained to patient that diet still plays a role for fluid accumulation between dialysis treatments. Patient has been resistant to efforts to educate regarding diet and has been eating fast food while hospitalized. Requested she review HF material r/t low sodium again and will f/u tomorrow for discussion and opportunity for questions.

## 2018-06-11 NOTE — PROGRESS NOTES
Problem: Mobility Impaired (Adult and Pediatric)  Goal: *Acute Goals and Plan of Care (Insert Text)  Physical Therapy Goals  Initiated 6/6/2018   1. Patient will ambulate with modified independence for 100 feet with the least restrictive device within 5 day(s). 5.  Patient will ascend/descend 4 stairs with 1 handrail(s) with supervision/set-up within 5 day(s). 3.. Patient will be able to articulate 3 strategies she will use to conserve energy upon return home. physical Therapy TREATMENT  Patient: Everett Anderson (88 y.o. female)  Date: 6/11/2018  Diagnosis: Chest pain  Chest pain <principal problem not specified>       Precautions:  contact, airborne  Chart, physical therapy assessment, plan of care and goals were reviewed. ASSESSMENT:  Patient cleared for PT. Received patient seated on edge of bed and  also present. Patient reported pain \"all over\" at 9/10, and reluctant to participate with PT but agreed to walk in the room. She refused gait belt. She stood and ambulated 30 feet in room with standby assistance. O2 sats stable ranging from 95-98% and HR 74. Patient on 2 liters at all times (which is baseline at home). Patient appeared steady on her feet. Awaiting dialysis. Progression toward goals:  []    Improving appropriately and progressing toward goals  [x]    Improving slowly and progressing toward goals  []    Not making progress toward goals and plan of care will be adjusted     PLAN:  Patient continues to benefit from skilled intervention to address the above impairments. Continue treatment per established plan of care. Discharge Recommendations: To Be Determined  Further Equipment Recommendations for Discharge:  None at this time. SUBJECTIVE:   Patient stated I have to get dialysis or I am going to die in here.     OBJECTIVE DATA SUMMARY:   Critical Behavior:  Neurologic State: Alert  Orientation Level: Oriented X4  Cognition: Appropriate for age attention/concentration, Appropriate decision making, Appropriate safety awareness, Follows commands  Safety/Judgement: Awareness of environment  Functional Mobility Training:  Bed Mobility:     Supine to Sit:  (received seated on edge of bed)              Transfers:  Sit to Stand: Independent  Stand to Sit: Independent                             Balance:  Sitting: Intact  Standing: Intact  Ambulation/Gait Training:  Distance (ft): 30 Feet (ft)  Assistive Device:  (none; refused gait belt and walker)  Ambulation - Level of Assistance: Stand-by assistance        Gait Abnormalities: Trunk sway increased        Base of Support: Widened                                           Therapeutic Exercises:   Encouraged patient to do BLE while seated or in bed. Pain:  Pain Scale 1: Numeric (0 - 10)  Pain Intensity 1: 9   Pain Location: \"all over\"              Activity Tolerance:   Good. Please refer to the flowsheet for vital signs taken during this treatment.   After treatment:   []    Patient left in no apparent distress sitting up in chair  [x]    Patient left in no apparent distress sitting on edge of bed  [x]    Call bell left within reach  []    Nursing notified  [x]    Caregiver present  []    Bed alarm activated    COMMUNICATION/COLLABORATION:   The patients plan of care was discussed with: Registered Nurse    Zeferino Dowell PT   Time Calculation: 13 mins

## 2018-06-11 NOTE — PROGRESS NOTES
SHIFT CHANGE:  1930 Bedside and Verbal shift change report given to Gemma EVANS (oncoming nurse) by Jade Rodriguez (offgoing nurse). Report included the following information SBAR, Kardex, MAR and Recent Results. SHIFT SUMMARY:  7037  Central line dressing changed using sterile procedure. Patient tolerated well. END OF SHIFT REPORT:  0730  Bedside and Verbal shift change report given to Jade Rodriguez (oncoming nurse) by Rochelle Caceres (offgoing nurse). Report included the following information SBAR, Kardex, MAR and Recent Results.

## 2018-06-11 NOTE — DIABETES MGMT
DTC Consult Note Follow-Up      1411: Obtained and entered order for NPH Q12 hours (starting at [address] Lanice Ache. Leeroy Maya MD. Entered in [address] [place] [address]. Iris Maxwell. Lillie Mayfield, MPH, RN, BSN, [address]   [phone] (office)  [phone] (pager)        POC Glucose last 24hrs:     Lab Results   Component Value Date/Time     (H) 06/11/2018 04:07 AM    GLUCPOC 201 (H) 06/11/2018 07:41 AM    GLUCPOC 337 (H) 06/10/2018 09:24 PM    GLUCPOC 234 (H) 06/10/2018 04:22 PM        Recommendations/ Comments: Initial consult completed 5/31/2018. Please consider adding NPH 15 units Q 12 hours (0.2 units/kg/day)* to aid in managing steroid induced hyperglycemia. *Weight-based calculations based on guidelines from  Mavis Hurt., & Doctor JUANCHO Garza (2009). Glucocorticoid-induced hyperglycemia. Endocrine Practice, 15(5), [id number]. Morning glucose: 192 mg/dL. Required 20 units of correction in the past 24 hours. Receiving prednisone 20mg daily starting 6/12. Received 40mg on 6/11,        Consult received from Baljit Berg MD  for  [x]    Assessment of home management - completed 5/31/2018  []    Meal planning  []    Meter / Monitoring  []    New Diagnosis  []    Insulin education  []    Insulin pump notification  []    Medication recommendations  []    Hospital glucose management  []    Daltoneth Blood  []    Outpatient Education - Information forwarded to [address] [place] [address] who will follow-up with pt 1 week after discharge     Hospital (inpatient) medications:  1. Correction Scale: Lispro (Humalog) Insulin Resistant Sensitivity scale to cover for glucose > 139 mg/dL before meals and for glucose >199 at bedtime      Assessment / Plan: (from 5/31/2018)    Chart reviewed and initial evaluation complete on [address] [place] [address] . Kesha Garduno is a 65yo AA female with a past medical history significant for  DM type 2, per Dr. Kylee Ramirez H&P dated 5/30/2018.  She sees Dr. Bakari Herr MD [facility]) on an outpatient basis. Checks glucose 3-4 times a week with values ranging 110-140 - once in the 180's. Her next appointment will be in 1 month. Per patient home medications are  1. Novolog Mix 70/30 - on a sliding scale after meals  For glucose <200 - has not used in 7 months               A1C:   Lab Results   Component Value Date/Time    Hemoglobin A1c 5.2 05/30/2018 03:30 PM    Hemoglobin A1c 5.8 11/22/2017 04:55 AM         Claritza Gonzales, MPH, RN, BSN, [address]   [phone] (office)  [phone] (pager)

## 2018-06-11 NOTE — DIALYSIS
[facility] Acutes  [phone]    Vitals   Pre   Post   Assessment   Pre   Post     Temp  Temp: 98.4 °F (36.9 °C) (06/11/18 1934)  97.6 LOC  A/OX3 follows commands A/OX3 follows commands   HR   Pulse (Heart Rate): 70 (HD initiated) (06/11/18 1934) 61 Lungs   Coarse/dim  Productive cough  Coarse/dim  Productive cough   B/P   BP: 119/80 (06/11/18 1934) 163/65 Cardiac   Heart sounds auscultated, pulses palpable Heart sounds auscultated, pulses palpable    Resp   Resp Rate: 16 (06/11/18 1934) 16 Skin   Warm, dry, intact  Warm, dry, intact   Pain level  Pain Intensity 1: 4 (06/11/18 1804) 7 - just received pain medication Edema  Ascites     remains   Orders:    Duration:   Start:   1921 End:   2052 Total:   1.5 hrs   Dialyzer:   Dialyzer/Set Up Inspection: [employer] - [id number], Dialyzer - [id number]) (06/11/18 1934)   K Bath:   Dialysate K (mEq/L): 2 (06/11/18 1934)   Ca Bath:   Dialysate CA (mEq/L): 2.5 (06/11/18 1934)   Na/Bicarb:   Dialysate NA (mEq/L): 140 (06/11/18 1934)   Target Fluid Removal:   Goal/Amount of Fluid to Remove (mL): 1000 mL (06/11/18 1934)   Access     Type & Location:   LLA AVF, no s/s of infection, Accessed with 17 g needles, +flash/aspiration/flush with NS, venous sluggish and cannot tolerate a BFR of 250 as ordered.  On call MD notified - ordered to continue tx at tolerable BFR of 150   Labs     Obtained/Reviewed   Critical Results Called   Date when labs were drawn-  Hgb-    HGB   Date Value Ref Range Status   06/10/2018 8.5 (L) 11.5 - 16.0 g/dL Final     K-    Potassium   Date Value Ref Range Status   06/11/2018 4.7 3.5 - 5.1 mmol/L Final     Ca-   Calcium   Date Value Ref Range Status   06/11/2018 9.2 8.5 - 10.1 MG/DL Final     Bun-   BUN   Date Value Ref Range Status   06/11/2018 102 (H) 6 - 20 MG/DL Final     Creat-   Creatinine   Date Value Ref Range Status   06/11/2018 4.57 (H) 0.55 - 1.02 MG/DL Final        Medications/ Blood Products Given     Name Dose   Route and Time     None ordered                Blood Volume Processed (BVP):    13 L Net Fluid   Removed:  1000 ml   Comments   Time Out Done: 1611  Primary Nurse Rpt Pre: ROSARIO Rodriguez RN  Primary Nurse Rpt Post: ROSARIO Coley RN  Pt Education: Purpose of needle gauge, infection control  Care Plan: Per nephrologist  Tx Summary: Pt. Tolerated tx fairly d/t venous pressures. Pt. First time having AVF cannulated, pt. Has poor tolerance to pain and refused a second stick on venous vein. Venous line sluggish and BFR at 150 majority of treatment. MD on call - Joan - notified. At the end, blood in circuit returned with 300 ml of NS. Needles removed X2. Hemostasis achieved with hand held pressure, clean dressing applied. Pt. Remains in room with call bell in reach and bed in lowest position. Consent signed - Informed Consent Verified: Yes (06/11/18 1934)  [facility] Consent - Signed  Hepatitis Status- Drawn 6/11/18  Machine #- Machine Number: [id number] (06/11/18 1934)  Telemetry status- not monitored  Pre-dialysis wt. - Pre-Dialysis Weight: 145.5 kg (320 lb 12.3 oz) (06/11/18 1934)

## 2018-06-11 NOTE — PROGRESS NOTES
Name: Varun La: [address]   : 1955 Admit Date: 2018   Phone: [phone]  Room: [facility]01   PCP: Cristianelorene FarrellDO isis  MRN: [id number]   Date: 2018  Code: Full Code          Chart and notes reviewed. Data reviewed. I review the patient's current medications in the medical record at each encounter. I have evaluated and examined the patient. Overnight events  Afebrile  Sats 99% on 2L  WBC 9.0  Hgb 8.5  Creat 4.57 - worse  RVP with parainfluenza 3    ROS: Feeling down as she may have to start HD. Still with some cough and chest congestion. Breathing otherwise about at her baseline. Denies fever or chills. Denies CP. Denies abd pain. Denies LE swelling.     Past Medical History:   Diagnosis Date     Sleep Apnea 2011    Aortic aneurysm (HCC)     CAD (coronary Artery Disease) Native Artery 2011    CKD (chronic kidney disease) stage 4, GFR 15-29 ml/min ([address] 2/10/2017    COPD (chronic obstructive pulmonary disease) (HCC)     Diabetic gastroparesis (HCC)     Diastolic heart failure ([address] 10/5/2012    DM type 2 causing neurological disease ([address]     DM type 2 causing renal disease ([address]     DM type 2 causing vascular disease ([address]     Esophageal stricture     dialted Dr. Strange Massed G6PD deficiency ([address]     trait    GERD (gastroesophageal reflux disease)     Gout     ILD (interstitial lung disease) ([address]     open lung bx CJW     Morbid obesity ([address]     OA (osteoarthritis)     Ovarian cancer ([address]     cervical and uterine    Rheumatoid arteritis     S/P left pulmonary artery pressure sensor implant placement 2017    [employer]     Tobacco use disorder 3/21/2012    Uterine cervix cancer ([address]     Vitamin D deficiency 2013       Past Surgical History:   Procedure Laterality Date    CARDIAC SURG PROCEDURE UNLIST      stents    COLONOSCOPY N/A 2016    COLONOSCOPY performed by Madeline Albright MD at [address] [place] [address] 10  HX APPENDECTOMY      HX CARPAL TUNNEL RELEASE      bilateral    HX CHOLECYSTECTOMY      HX HERNIA REPAIR      HX HYSTERECTOMY      HX ORTHOPAEDIC  11/12/12    right knee replacement    HX TONSIL AND ADENOIDECTOMY      UPPER GI ENDOSCOPY,DILATN W GUIDE  6/24/2016            Family History   Problem Relation Age of Onset    Heart Disease Mother     Heart Disease Brother        Social History   Substance Use Topics    Smoking status: Former Smoker     Packs/day: 0.50     Years: 41.00     Types: Cigarettes     Quit date: 11/9/2014    Smokeless tobacco: Never Used    Alcohol use No       Allergies   Allergen Reactions    Contrast Dye [Iodine] Anaphylaxis     Tolerates when pre medicated w/ IV solumedrol and benadryl prior to procedure     Levaquin [Levofloxacin] Nausea and Vomiting    Morphine Hives and Itching       Current Facility-Administered Medications   Medication Dose Route Frequency    albuterol-ipratropium (DUO-NEB) 2.5 MG-0.5 MG/3 ML  3 mL Nebulization Q8H RT    labetalol (NORMODYNE;TRANDATE) injection 20 mg  20 mg IntraVENous Q4H PRN    hydrALAZINE (APRESOLINE) tablet 100 mg  100 mg Oral TID    doxycycline (VIBRA-TABS) tablet 100 mg  100 mg Oral Q12H    predniSONE (DELTASONE) tablet 40 mg  40 mg Oral DAILY WITH BREAKFAST    sodium chloride (OCEAN) 0.65 % nasal squeeze bottle 2 Spray  2 Spray Both Nostrils Q2H PRN    HYDROmorphone (DILAUDID) syringe 0.5 mg  0.5 mg IntraVENous Q4H PRN    metOLazone (ZAROXOLYN) tablet 5 mg  5 mg Oral DAILY    epoetin charlie (EPOGEN;PROCRIT) injection 10,000 Units  10,000 Units SubCUTAneous Once per day on Thu Sat    ondansetron (ZOFRAN ODT) tablet 8 mg  8 mg Oral Q8H PRN    magnesium oxide (MAG-OX) tablet 400 mg  400 mg Oral BID    lidocaine (XYLOCAINE) 10 mg/mL (1 %) injection 10-20 mL  10-20 mL IntraDERMal Multiple    sodium chloride (NS) flush 5-10 mL  5-10 mL IntraVENous Q8H    sodium chloride (NS) flush 5-10 mL  5-10 mL IntraVENous PRN    bumetanide (BUMEX) injection 2 mg  2 mg IntraVENous BID    0.9% sodium chloride infusion 250 mL  250 mL IntraVENous PRN    nitroglycerin (NITROSTAT) tablet 0.4 mg  0.4 mg SubLINGual PRN    oxyCODONE-acetaminophen (PERCOCET 7.5) 7.5-325 mg per tablet 1 Tab  1 Tab Oral Q6H PRN    Warfarin: pharmacy to dose    Other Rx Dosing/Monitoring    amLODIPine (NORVASC) tablet 10 mg  10 mg Oral DAILY    albuterol (PROVENTIL VENTOLIN) nebulizer solution 2.5 mg  2.5 mg Nebulization Q6H PRN    allopurinol (ZYLOPRIM) tablet 100 mg  100 mg Oral DAILY    aspirin delayed-release tablet 81 mg  81 mg Oral DAILY    atorvastatin (LIPITOR) tablet 80 mg  80 mg Oral QHS    calcitRIOL (ROCALTROL) capsule 0.25 mcg  0.25 mcg Oral Q M, W, F & SAT    carvedilol (COREG) tablet 25 mg  25 mg Oral BID WITH MEALS    ergocalciferol (ERGOCALCIFEROL) capsule 50,000 Units  50,000 Units Oral every Tuesday    fluticasone (FLONASE) 50 mcg/actuation nasal spray 2 Spray  2 Spray Both Nostrils DAILY PRN    hydrOXYzine HCl (ATARAX) tablet 25 mg  25 mg Oral TID PRN    isosorbide mononitrate ER (IMDUR) tablet 120 mg  120 mg Oral DAILY    pantoprazole (PROTONIX) tablet 40 mg  40 mg Oral ACB    polyethylene glycol (MIRALAX) packet 17 g  17 g Oral DAILY    senna-docusate (PERICOLACE) 8.6-50 mg per tablet 1 Tab  1 Tab Oral DAILY    sodium chloride (NS) flush 5-10 mL  5-10 mL IntraVENous Q8H    sodium chloride (NS) flush 5-10 mL  5-10 mL IntraVENous PRN    naloxone (NARCAN) injection 0.4 mg  0.4 mg IntraVENous PRN    glucose chewable tablet 16 g  4 Tab Oral PRN    dextrose (D50W) injection syrg 12.5-25 g  12.5-25 g IntraVENous PRN    glucagon (GLUCAGEN) injection 1 mg  1 mg IntraMUSCular PRN    acetaminophen (TYLENOL) tablet 650 mg  650 mg Oral Q4H PRN    diphenhydrAMINE (BENADRYL) capsule 25 mg  25 mg Oral Q4H PRN    insulin lispro (HUMALOG) injection   SubCUTAneous AC&HS         REVIEW OF SYSTEMS   12 point ROS negative except as stated in the HPI. Physical Exam:   Visit Vitals    /68 (BP 1 Location: Right arm, BP Patient Position: At rest;Supine)    Pulse 70    Temp 98 °F (36.7 °C)    Resp 16    Ht 5' 10\" (1.778 m)    Wt 145.5 kg (320 lb 12.3 oz)    SpO2 99%    BMI 46.03 kg/m2       General:  Alert, cooperative, ill appearing, appears stated age. Head:  Normocephalic, without obvious abnormality, atraumatic. Eyes:  Conjunctivae/corneas clear. Nose: Nares normal. Septum midline. Mucosa normal.    Throat: Lips, mucosa, and tongue normal.    Neck: Supple, symmetrical, trachea midline, no adenopathy. Lungs:   Improved air movement with trace wheeze and occasional rhonchi bilaterally. Chest wall:  No tenderness or deformity. Heart:  Regular rate and rhythm, S1, S2 normal, no murmur, click, rub or gallop. Abdomen:   Soft, non-tender. Bowel sounds normal. No masses,  No organomegaly. Extremities: Extremities normal, atraumatic, no cyanosis, trace - 1+ peripheral edema. Pulses: 2+ and symmetric all extremities.    Skin: Skin color, texture, turgor normal. No rashes or lesions   Lymph nodes: Cervical, supraclavicular nodes normal.   Neurologic: Grossly nonfocal       Lab Results   Component Value Date/Time    Sodium 135 (L) 06/11/2018 04:07 AM    Potassium 4.7 06/11/2018 04:07 AM    Chloride 100 06/11/2018 04:07 AM    CO2 25 06/11/2018 04:07 AM     (H) 06/11/2018 04:07 AM    Creatinine 4.57 (H) 06/11/2018 04:07 AM    Glucose 192 (H) 06/11/2018 04:07 AM    Calcium 9.2 06/11/2018 04:07 AM    Magnesium 2.2 06/10/2018 04:53 AM    Phosphorus 4.9 (H) 06/11/2018 04:07 AM    Lactic acid 0.6 06/08/2018 01:05 AM       Lab Results   Component Value Date/Time    WBC 9.0 06/10/2018 04:53 AM    HGB 8.5 (L) 06/10/2018 04:53 AM    PLATELET 847 41/66/2477 04:53 AM    MCV 93.7 06/10/2018 04:53 AM       Lab Results   Component Value Date/Time    INR 2.5 (H) 06/11/2018 04:07 AM    aPTT 50.6 (H) 06/05/2018 03:09 AM    AST (SGOT) 24 11/21/2017 12:40 PM    Alk. phosphatase 94 11/21/2017 12:40 PM    Protein, total 6.8 11/21/2017 12:40 PM    Albumin 3.6 06/11/2018 04:07 AM    Globulin 3.6 11/21/2017 12:40 PM       Lab Results   Component Value Date/Time    Iron 55 05/31/2018 02:08 PM    TIBC 201 (L) 05/31/2018 02:08 PM    Iron % saturation 27 05/31/2018 02:08 PM    Ferritin 536 (H) 05/31/2018 02:08 PM       Lab Results   Component Value Date/Time    C-Reactive protein <0.29 06/08/2018 09:39 AM        No results found for: PH, PHI, PCO2, PCO2I, PO2, PO2I, HCO3, HCO3I, FIO2, FIO2I    Lab Results   Component Value Date/Time    CK 43 03/03/2017 02:20 PM    CK-MB Index Cannot be calulated 03/03/2017 02:20 PM    Troponin-I, Qt. <0.04 05/31/2018 01:51 AM        Lab Results   Component Value Date/Time    Culture result: NO GROWTH 6 DAYS 02/28/2017 04:26 PM    Culture result: HEAVY  NORMAL RESPIRATORY WAGNER   02/03/2017 04:10 PM    Culture result: MODERATE  CANDIDA TROPICALIS   (A) 02/03/2017 04:10 PM    Culture result:  02/03/2017 04:10 PM     CULTURE WILL BE HELD FOR 4 WEEKS. IF THERE IS ADDITIONAL FUNGAL GROWTH, A NEW REPORT WILL FOLLOW.        Lab Results   Component Value Date/Time    Hepatitis B surface Ag 0.13 03/02/2017 05:01 PM       Lab Results   Component Value Date/Time    CK 43 03/03/2017 02:20 PM    CK 81 02/28/2017 12:55 PM       Lab Results   Component Value Date/Time    Color YELLOW/STRAW 03/03/2017 12:17 AM    Appearance CLEAR 03/03/2017 12:17 AM    pH (UA) 5.0 03/03/2017 12:17 AM    Protein 30 (A) 03/03/2017 12:17 AM    Glucose >1000 (A) 03/03/2017 12:17 AM    Ketone NEGATIVE  03/03/2017 12:17 AM    Bilirubin NEGATIVE  03/03/2017 12:17 AM    Blood NEGATIVE  03/03/2017 12:17 AM    Urobilinogen 0.2 03/03/2017 12:17 AM    Nitrites NEGATIVE  03/03/2017 12:17 AM    Leukocyte Esterase TRACE (A) 03/03/2017 12:17 AM    WBC 0-4 03/03/2017 12:17 AM    RBC 0-5 03/03/2017 12:17 AM    Bacteria 1+ (A) 03/03/2017 12:17 AM       Images: no new images this morning    IMPRESSION  · Chronic hypoxic respiratory failure  · Abnormal chest CT: RUL findings slightly more prominent and consolidated and could represent new infection. Remained of her scan is essentially unchanged from prior images. · Hx of smoking related ILD (via open lung biopsy in 2010) with respiratory bronchiolitis  · JASEN  · Diastolic heart failure class III  · Pulm HTN  · CAD with 1 vessel disease/RCA   · Chronic severe anemia (refractory to Procrit)  · Chronic anticoagulation for DVT  · HTN  · CKD   · Obesity    PLAN  · Continue O2 as needed to keep sats > 90%; on 2L at baseline  · Continue diuresis with Bumex and metolazone per Renal  · Prednisone taper   · Continue prn albuterol nebs  · Short course of Doxy for 5 days. · Cardiology following; on high dose Imdur  · Home PAP nightly  · GI prophylaxis: Protonix  · DVT prophylaxis: Coumadin pharmacy to dose    Patient is stable from a pulmonary standpoint. We will sign off and arrange for outpatient follow up with Dr. Brooklyn Ailcia in 2-4 weeks. Please call with questions.     Lorene Gorman

## 2018-06-11 NOTE — PROGRESS NOTES
EMR reviwed and I spoke with patient. Discharge plan remains the same to return home to live with her . [facility]. I will continue to follow.  Thanks CHANDA Maier

## 2018-06-11 NOTE — PROGRESS NOTES
Ezio Craig [facility] [address]  [address] [facility], [address] [place] [address]  [phone]      Medical Progress Note      NAME: Dao Acosta   :  1955  MRM:  [id number]    Date/Time: 2018  7:26 AM       Assessment and Plan:   1. Chronic respiratory failure with hypoxia / COPD, severe / ILD (interstitial lung disease) / Pulmonary HTN - continue diuretics. Palliative care consulted. Continue Oxygen as needed. CT scan of the chest from  noted. Continue bumex.       2. Parainfluenza virus - Tested positive on resp screen.  Supportive care.        3. Chronic diastolic heart failure - Continue bumex at higher dose and metolazone, strict I's and O's, daily weights, low salt diet. Cardiology following       4. DM (diabetes mellitus), type 2 with renal, vascular and neurological complications - Diabetic diet and counseling.  SSI per protocol. Not on home meds.       5. CKD (chronic kidney disease) stage 4 - Nephrology following. Likely to progress to ESRD. She is a poor candidate for HD. She has an AV fistula      6. CAD (coronary Artery Disease) Native Artery / HTN (hypertension) / Chest pain / Dyspnea - Pain chronic, unlikely AMI. Cath failed. Continue ASA, statin, BB, Imdur, as medical management.  No further plans for cath or workup.      7. Gastroparesis / Esophageal reflux - PPI.       8. Morbid obesity / JASEN on CPAP - continue CPAP, advise weight loss       9. Normocytic anemia - POA, heme/onc consulted. Martha Okeefe just chronic disease. s/p BMB, pathology negative. EPO per renal.      10. Hx of deep venous thrombosis / Warfarin anticoagulation - continue  Coumadin       11. Gout - stable on allopurinol                Subjective:     Chief Complaint:  Follow up of pt who was admitted with respiratory failure. C/o SOB     ROS:  (bold if positive, if negative)      Tolerating PT  Tolerating Diet        Objective:     Last 24hrs VS reviewed since prior progress note.  Most recent are:    Visit Vitals    /83 (BP 1 Location: Right arm, BP Patient Position: At rest)    Pulse 72    Temp 98 °F (36.7 °C)    Resp 10    Ht 5' 10\" (1.778 m)    Wt 145.5 kg (320 lb 12.3 oz)    SpO2 100%    BMI 46.03 kg/m2     SpO2 Readings from Last 6 Encounters:   06/11/18 100%   05/15/18 91%   04/03/18 96%   03/20/18 98%   01/24/18 98%   01/10/18 95%    O2 Flow Rate (L/min): 2 l/min     Intake/Output Summary (Last 24 hours) at 06/11/18 0726  Last data filed at 06/10/18 1524   Gross per 24 hour   Intake              480 ml   Output                0 ml   Net              480 ml        Physical Exam:    Gen:  obese, in no acute distress  HEENT:  Pink conjunctivae, PERRL, hearing intact to voice, moist mucous membranes  Neck:  Supple, without masses, thyroid non-tender  Resp:  No accessory muscle use, clear breath sounds without wheezes rales or rhonchi  Card:  No murmurs, normal S1, S2 without thrills, bruits or peripheral edema  Abd:  Soft, non-tender, non-distended, normoactive bowel sounds are present, no palpable organomegaly and no detectable hernias  Lymph:  No cervical or inguinal adenopathy  Musc:  No cyanosis or clubbing  Skin:  No rashes or ulcers, skin turgor is good  Neuro:  Cranial nerves are grossly intact, no focal motor weakness, follows commands appropriately  Psych:  Good insight, oriented to person, place and time, alert  __________________________________________________________________  Medications Reviewed: (see below)  Medications:     Current Facility-Administered Medications   Medication Dose Route Frequency    albuterol-ipratropium (DUO-NEB) 2.5 MG-0.5 MG/3 ML  3 mL Nebulization Q8H RT    labetalol (NORMODYNE;TRANDATE) injection 20 mg  20 mg IntraVENous Q4H PRN    hydrALAZINE (APRESOLINE) tablet 100 mg  100 mg Oral TID    doxycycline (VIBRA-TABS) tablet 100 mg  100 mg Oral Q12H    predniSONE (DELTASONE) tablet 40 mg  40 mg Oral DAILY WITH BREAKFAST    sodium chloride (OCEAN) 0.65 % nasal squeeze bottle 2 Spray  2 Spray Both Nostrils Q2H PRN    HYDROmorphone (DILAUDID) syringe 0.5 mg  0.5 mg IntraVENous Q4H PRN    metOLazone (ZAROXOLYN) tablet 5 mg  5 mg Oral DAILY    epoetin charlie (EPOGEN;PROCRIT) injection 10,000 Units  10,000 Units SubCUTAneous Once per day on Thu Sat    ondansetron (ZOFRAN ODT) tablet 8 mg  8 mg Oral Q8H PRN    magnesium oxide (MAG-OX) tablet 400 mg  400 mg Oral BID    lidocaine (XYLOCAINE) 10 mg/mL (1 %) injection 10-20 mL  10-20 mL IntraDERMal Multiple    sodium chloride (NS) flush 5-10 mL  5-10 mL IntraVENous Q8H    sodium chloride (NS) flush 5-10 mL  5-10 mL IntraVENous PRN    bumetanide (BUMEX) injection 2 mg  2 mg IntraVENous BID    sodium chloride (OCEAN) 0.65 % nasal squeeze bottle 2 Spray  2 Spray Both Nostrils Q2H PRN    0.9% sodium chloride infusion 250 mL  250 mL IntraVENous PRN    nitroglycerin (NITROSTAT) tablet 0.4 mg  0.4 mg SubLINGual PRN    oxyCODONE-acetaminophen (PERCOCET 7.5) 7.5-325 mg per tablet 1 Tab  1 Tab Oral Q6H PRN    Warfarin: pharmacy to dose    Other Rx Dosing/Monitoring    amLODIPine (NORVASC) tablet 10 mg  10 mg Oral DAILY    albuterol (PROVENTIL VENTOLIN) nebulizer solution 2.5 mg  2.5 mg Nebulization Q6H PRN    allopurinol (ZYLOPRIM) tablet 100 mg  100 mg Oral DAILY    aspirin delayed-release tablet 81 mg  81 mg Oral DAILY    atorvastatin (LIPITOR) tablet 80 mg  80 mg Oral QHS    calcitRIOL (ROCALTROL) capsule 0.25 mcg  0.25 mcg Oral Q M, W, F & SAT    carvedilol (COREG) tablet 25 mg  25 mg Oral BID WITH MEALS    ergocalciferol (ERGOCALCIFEROL) capsule 50,000 Units  50,000 Units Oral every Tuesday    fluticasone (FLONASE) 50 mcg/actuation nasal spray 2 Spray  2 Spray Both Nostrils DAILY PRN    hydrOXYzine HCl (ATARAX) tablet 25 mg  25 mg Oral TID PRN    isosorbide mononitrate ER (IMDUR) tablet 120 mg  120 mg Oral DAILY    pantoprazole (PROTONIX) tablet 40 mg  40 mg Oral ACB    polyethylene glycol (MIRALAX) packet 17 g  17 g Oral DAILY    senna-docusate (PERICOLACE) 8.6-50 mg per tablet 1 Tab  1 Tab Oral DAILY    sodium chloride (NS) flush 5-10 mL  5-10 mL IntraVENous Q8H    sodium chloride (NS) flush 5-10 mL  5-10 mL IntraVENous PRN    naloxone (NARCAN) injection 0.4 mg  0.4 mg IntraVENous PRN    glucose chewable tablet 16 g  4 Tab Oral PRN    dextrose (D50W) injection syrg 12.5-25 g  12.5-25 g IntraVENous PRN    glucagon (GLUCAGEN) injection 1 mg  1 mg IntraMUSCular PRN    acetaminophen (TYLENOL) tablet 650 mg  650 mg Oral Q4H PRN    diphenhydrAMINE (BENADRYL) capsule 25 mg  25 mg Oral Q4H PRN    insulin lispro (HUMALOG) injection   SubCUTAneous AC&HS        Lab Data Reviewed: (see below)  Lab Review:     Recent Labs      06/10/18   0453   WBC  9.0   HGB  8.5*   HCT  26.7*   PLT  180     Recent Labs      06/11/18   0407  06/10/18   0453  06/09/18   0840  06/09/18 0452   NA  135*  136  136   --    K  4.7  4.6  4.5   --    CL  100  102  102   --    CO2  25  25  25   --    GLU  192*  148*  200*   --    BUN  102*  98*  84*   --    CREA  4.57*  4.14*  4.22*   --    CA  9.2  9.4  9.3   --    MG   --   2.2   --    --    PHOS  4.9*  4.6  4.7   --    ALB  3.6  3.5  3.3*   --    INR  2.5*  2.6*   --   2.1*     Lab Results   Component Value Date/Time    Glucose (POC) 337 (H) 06/10/2018 09:24 PM    Glucose (POC) 234 (H) 06/10/2018 04:22 PM    Glucose (POC) 228 (H) 06/10/2018 11:26 AM    Glucose (POC) 158 (H) 06/10/2018 07:05 AM    Glucose (POC) 332 (H) 06/09/2018 08:50 PM     No results for input(s): PH, PCO2, PO2, HCO3, FIO2 in the last 72 hours. Recent Labs      06/11/18   0407  06/10/18   0453  06/09/18   0452   INR  2.5*  2.6*  2.1*     All Micro Results     Procedure Component Value Units Date/Time    RAYSHAWN Brandt, UR/CSF [[id number]] Collected:  06/08/18 8243    Order Status:  Completed Specimen:  Other from Miscellaneous sample Updated:  06/09/18 1236     Source URINE        Specimen Urine     Streptococcus pneumoniae Ag NEGATIVE         Fluid culture Not Indicated     Organism ID Not indicated. Please note Comment         (NOTE)  College of [employer] standards require a culture to be  performed on CSF specimens submitted for bacterial antigen testing. (CAP [id number]) Urine specimens will not be cultured. Performed At: [address] [id number]  Radha Ramos MD [id number]         RESPIRATORY [place] [[id number]]  (Abnormal) Collected:  06/08/18 0824    Order Status:  Completed Specimen:  Nasopharyngeal Updated:  06/08/18 1247     Adenovirus NOT DETECTED        Coronavirus 229E NOT DETECTED        Coronavirus HKU1 NOT DETECTED        Coronavirus CVNL63 NOT DETECTED        Coronavirus OC43 NOT DETECTED        Metapneumovirus NOT DETECTED        Rhinovirus and Enterovirus NOT DETECTED        Influenza A NOT DETECTED        Influenza A, subtype H1 NOT DETECTED        Influenza A, subtype H3 NOT DETECTED        INFLUENZA A H1N1 PCR NOT DETECTED        Influenza B NOT DETECTED        Parainfluenza 1 NOT DETECTED        Parainfluenza 2 NOT DETECTED        Parainfluenza 3 DETECTED (A)        Parainfluenza virus 4 NOT DETECTED        RSV by PCR NOT DETECTED        Bordetella pertussis - PCR NOT DETECTED        Chlamydophila pneumoniae DNA, QL, PCR NOT DETECTED        Mycoplasma pneumoniae DNA, QL, PCR NOT DETECTED       URINE CULTURE HOLD SAMPLE [[id number]] Collected:  06/08/18 0826    Order Status:  Completed Specimen:  Serum Updated:  06/08/18 0835     Urine culture hold         URINE ON HOLD IN MICROBIOLOGY DEPT FOR 3 DAYS. IF UNPRESERVED URINE IS SUBMITTED, IT CANNOT BE USED FOR ADDITIONAL TESTING AFTER 24 HRS, RECOLLECTION WILL BE REQUIRED. I have reviewed notes of prior 24hr. Other pertinent lab:       Total time spent with patient: [address] discussed with: Patient, Family, Nursing Staff and >50% of time spent in counseling and coordination of care    Discussed:  Care Plan    Prophylaxis: warfarin     Disposition:  Home w/Family           ___________________________________________________    Attending Physician: Tahir Rangel MD

## 2018-06-12 LAB
ALBUMIN SERPL-MCNC: 3.6 G/DL (ref 3.5–5)
ANION GAP SERPL CALC-SCNC: 9 MMOL/L (ref 5–15)
BASOPHILS # BLD: 0 K/UL (ref 0–0.1)
BASOPHILS NFR BLD: 0 % (ref 0–1)
BUN SERPL-MCNC: 93 MG/DL (ref 6–20)
BUN/CREAT SERPL: 22 (ref 12–20)
CALCIUM SERPL-MCNC: 9.8 MG/DL (ref 8.5–10.1)
CHLORIDE SERPL-SCNC: 100 MMOL/L (ref 97–108)
CO2 SERPL-SCNC: 25 MMOL/L (ref 21–32)
CREAT SERPL-MCNC: 4.21 MG/DL (ref 0.55–1.02)
DIFFERENTIAL METHOD BLD: ABNORMAL
EOSINOPHIL # BLD: 0 K/UL (ref 0–0.4)
EOSINOPHIL NFR BLD: 0 % (ref 0–7)
ERYTHROCYTE [DISTWIDTH] IN BLOOD BY AUTOMATED COUNT: 16 % (ref 11.5–14.5)
GLUCOSE BLD STRIP.AUTO-MCNC: 161 MG/DL (ref 65–100)
GLUCOSE BLD STRIP.AUTO-MCNC: 169 MG/DL (ref 65–100)
GLUCOSE BLD STRIP.AUTO-MCNC: 204 MG/DL (ref 65–100)
GLUCOSE BLD STRIP.AUTO-MCNC: 243 MG/DL (ref 65–100)
GLUCOSE BLD STRIP.AUTO-MCNC: 274 MG/DL (ref 65–100)
GLUCOSE SERPL-MCNC: 169 MG/DL (ref 65–100)
HBV SURFACE AG SER QL: 0.18 INDEX
HBV SURFACE AG SER QL: NEGATIVE
HCT VFR BLD AUTO: 30.2 % (ref 35–47)
HGB BLD-MCNC: 9.5 G/DL (ref 11.5–16)
IMM GRANULOCYTES # BLD: 0.3 K/UL (ref 0–0.04)
IMM GRANULOCYTES NFR BLD AUTO: 2 % (ref 0–0.5)
INR PPP: 2.2 (ref 0.9–1.1)
LYMPHOCYTES # BLD: 3.1 K/UL (ref 0.8–3.5)
LYMPHOCYTES NFR BLD: 20 % (ref 12–49)
MCH RBC QN AUTO: 29.4 PG (ref 26–34)
MCHC RBC AUTO-ENTMCNC: 31.5 G/DL (ref 30–36.5)
MCV RBC AUTO: 93.5 FL (ref 80–99)
MONOCYTES # BLD: 1.1 K/UL (ref 0–1)
MONOCYTES NFR BLD: 7 % (ref 5–13)
NEUTS SEG # BLD: 11.1 K/UL (ref 1.8–8)
NEUTS SEG NFR BLD: 71 % (ref 32–75)
NRBC # BLD: 0.03 K/UL (ref 0–0.01)
NRBC BLD-RTO: 0.2 PER 100 WBC
PHOSPHATE SERPL-MCNC: 4.9 MG/DL (ref 2.6–4.7)
PLATELET # BLD AUTO: 255 K/UL (ref 150–400)
PMV BLD AUTO: 10.4 FL (ref 8.9–12.9)
POTASSIUM SERPL-SCNC: 4.6 MMOL/L (ref 3.5–5.1)
PROTHROMBIN TIME: 21.8 SEC (ref 9–11.1)
RBC # BLD AUTO: 3.23 M/UL (ref 3.8–5.2)
SERVICE CMNT-IMP: ABNORMAL
SODIUM SERPL-SCNC: 134 MMOL/L (ref 136–145)
WBC # BLD AUTO: 15.6 K/UL (ref 3.6–11)

## 2018-06-12 PROCEDURE — 80069 RENAL FUNCTION PANEL: CPT | Performed by: INTERNAL MEDICINE

## 2018-06-12 PROCEDURE — 74011000250 HC RX REV CODE- 250: Performed by: INTERNAL MEDICINE

## 2018-06-12 PROCEDURE — 74011250637 HC RX REV CODE- 250/637: Performed by: INTERNAL MEDICINE

## 2018-06-12 PROCEDURE — 80074 ACUTE HEPATITIS PANEL: CPT | Performed by: INTERNAL MEDICINE

## 2018-06-12 PROCEDURE — 74011250637 HC RX REV CODE- 250/637: Performed by: PHYSICIAN ASSISTANT

## 2018-06-12 PROCEDURE — 85025 COMPLETE CBC W/AUTO DIFF WBC: CPT | Performed by: INTERNAL MEDICINE

## 2018-06-12 PROCEDURE — 87340 HEPATITIS B SURFACE AG IA: CPT | Performed by: INTERNAL MEDICINE

## 2018-06-12 PROCEDURE — 74011636637 HC RX REV CODE- 636/637: Performed by: INTERNAL MEDICINE

## 2018-06-12 PROCEDURE — 77030038269 HC DRN EXT URIN PURWCK BARD -A

## 2018-06-12 PROCEDURE — 74011250637 HC RX REV CODE- 250/637: Performed by: NURSE PRACTITIONER

## 2018-06-12 PROCEDURE — 94640 AIRWAY INHALATION TREATMENT: CPT

## 2018-06-12 PROCEDURE — 94660 CPAP INITIATION&MGMT: CPT

## 2018-06-12 PROCEDURE — 65660000000 HC RM CCU STEPDOWN

## 2018-06-12 PROCEDURE — 74011000250 HC RX REV CODE- 250: Performed by: NURSE PRACTITIONER

## 2018-06-12 PROCEDURE — 85610 PROTHROMBIN TIME: CPT | Performed by: NURSE PRACTITIONER

## 2018-06-12 PROCEDURE — 82962 GLUCOSE BLOOD TEST: CPT

## 2018-06-12 PROCEDURE — 77010033678 HC OXYGEN DAILY

## 2018-06-12 PROCEDURE — 36415 COLL VENOUS BLD VENIPUNCTURE: CPT | Performed by: INTERNAL MEDICINE

## 2018-06-12 PROCEDURE — 74011250636 HC RX REV CODE- 250/636: Performed by: INTERNAL MEDICINE

## 2018-06-12 RX ORDER — CLONIDINE HYDROCHLORIDE 0.1 MG/1
0.1 TABLET ORAL 2 TIMES DAILY
Status: DISCONTINUED | OUTPATIENT
Start: 2018-06-12 | End: 2018-06-15

## 2018-06-12 RX ORDER — LIDOCAINE AND PRILOCAINE 25; 25 MG/G; MG/G
CREAM TOPICAL AS NEEDED
Status: DISCONTINUED | OUTPATIENT
Start: 2018-06-12 | End: 2018-06-15 | Stop reason: HOSPADM

## 2018-06-12 RX ADMIN — Medication 10 ML: at 21:47

## 2018-06-12 RX ADMIN — Medication 400 MG: at 17:58

## 2018-06-12 RX ADMIN — Medication 10 ML: at 15:45

## 2018-06-12 RX ADMIN — ONDANSETRON 8 MG: 4 TABLET, ORALLY DISINTEGRATING ORAL at 15:44

## 2018-06-12 RX ADMIN — HYDRALAZINE HYDROCHLORIDE 100 MG: 25 TABLET ORAL at 21:46

## 2018-06-12 RX ADMIN — WARFARIN SODIUM 3 MG: 1 TABLET ORAL at 17:58

## 2018-06-12 RX ADMIN — BUMETANIDE 2 MG: 0.25 INJECTION INTRAMUSCULAR; INTRAVENOUS at 17:59

## 2018-06-12 RX ADMIN — DOXYCYCLINE HYCLATE 100 MG: 100 TABLET, COATED ORAL at 21:46

## 2018-06-12 RX ADMIN — HUMAN INSULIN 15 UNITS: 100 INJECTION, SUSPENSION SUBCUTANEOUS at 21:46

## 2018-06-12 RX ADMIN — HYDROMORPHONE HYDROCHLORIDE 0.5 MG: 1 INJECTION, SOLUTION INTRAMUSCULAR; INTRAVENOUS; SUBCUTANEOUS at 15:45

## 2018-06-12 RX ADMIN — HYDRALAZINE HYDROCHLORIDE 100 MG: 25 TABLET ORAL at 15:57

## 2018-06-12 RX ADMIN — Medication 10 ML: at 15:46

## 2018-06-12 RX ADMIN — INSULIN LISPRO 2 UNITS: 100 INJECTION, SOLUTION INTRAVENOUS; SUBCUTANEOUS at 21:46

## 2018-06-12 RX ADMIN — INSULIN LISPRO 3 UNITS: 100 INJECTION, SOLUTION INTRAVENOUS; SUBCUTANEOUS at 08:56

## 2018-06-12 RX ADMIN — IPRATROPIUM BROMIDE AND ALBUTEROL SULFATE 3 ML: .5; 3 SOLUTION RESPIRATORY (INHALATION) at 23:54

## 2018-06-12 RX ADMIN — PANTOPRAZOLE SODIUM 40 MG: 40 TABLET, DELAYED RELEASE ORAL at 08:56

## 2018-06-12 RX ADMIN — CLONIDINE HYDROCHLORIDE 0.1 MG: 0.1 TABLET ORAL at 17:59

## 2018-06-12 RX ADMIN — LIDOCAINE AND PRILOCAINE: 25; 25 CREAM TOPICAL at 10:30

## 2018-06-12 RX ADMIN — ATORVASTATIN CALCIUM 80 MG: 20 TABLET, FILM COATED ORAL at 21:46

## 2018-06-12 RX ADMIN — CARVEDILOL 25 MG: 12.5 TABLET, FILM COATED ORAL at 17:58

## 2018-06-12 RX ADMIN — INSULIN LISPRO 4 UNITS: 100 INJECTION, SOLUTION INTRAVENOUS; SUBCUTANEOUS at 12:58

## 2018-06-12 RX ADMIN — Medication 10 ML: at 04:45

## 2018-06-12 RX ADMIN — INSULIN LISPRO 3 UNITS: 100 INJECTION, SOLUTION INTRAVENOUS; SUBCUTANEOUS at 18:00

## 2018-06-12 NOTE — PROGRESS NOTES
[facility] Dosing Services: 6/12/18    Consult for Warfarin Dosing by Pharmacy by JUANCHO Zavaleta NP  Consult provided for this 63 y.o.  female , for indication of Hx of DVT (6/2017)  PTA Dose: Warfarin 2 mg on Mon/Fri and 3 mg on Tue/Wed/Thur/Sat/Sun  Dose to achieve an INR goal of 2-3    Order entered for  Warfarin  3 (mg) ordered to be given today at 18:00. Significant drug interactions: allopurinol  Previous dose given 2mg   PT/INR Lab Results   Component Value Date/Time    INR 2.2 (H) 06/12/2018 04:40 AM      Platelets Lab Results   Component Value Date/Time    PLATELET 359 46/38/8078 04:40 AM      H/H Lab Results   Component Value Date/Time    HGB 9.5 (L) 06/12/2018 04:40 AM        Pharmacy to follow daily and will provide subsequent Warfarin dosing based on clinical status. Eduard Grier, [address] [place], [place] [address] [address] [id number] [phone]

## 2018-06-12 NOTE — PROGRESS NOTES
Cardiology Progress Note                             [address], [place], [address]                                 Phone [phone]; Fax [phone]        2018 9:23 AM     Admit Date:           2018  Admit Diagnosis:  Chest pain  Chest pain  :          1955   MRN:          [id number]   ASSESSMENT/RECOMMENDATION:   1)Chronic recurrent anginal chest pain, exertional and non exertional.  - CAD with 1v disease/RCA  -cont ASA/statin/BB  - C cath unable to be done d/t respiratory status. -on high dose of imdur       2) Chronic HFpEF, class III, s/p CardioMEMS for PA monitoring:   - RHC yesterday showing Moderate-severe PA HTN, post capillary. Marked elevation in biV filling pressures. Cardiomem re- calibrated   - defer diuretics to nephrology, also now on HD      3) CKD  - creatinine stable at 4-4.5 will defer diuretics to nephrology, responding, but still fluid +. She started HD yesterday. Planned HD today and tomorrow. -  AV fistula functioning       4) Chronic severe anemia, refractory to Procrit:   - hematology following   S/p BMB- normal   H/H stable      5) Chronic anticoagulation for DVT   - s/p Vit K and FFP (prior to BM biopsy)   - coumadin restarted. INr therapeutic     6) Hypertension: BP 150s.  Hydralazine 100 mg to TID  - coreg/norvasc/imdur.   -start low dose clonidine, hold prior to HD this AM     7) Hypo magensium: resolved     8) Chronic hypoxic resp failure: pulmonary following  -CT of chest this AM showing new groundglass areas of opacity and mild consolidation in the right upper lobe and lingula most consistent with superimposed pneumonia or alveolitis on chronic interstitial lung disease.   -prn nebs   -afebrile     9) Leukocytosis: WBC up 15 today                             Last 3 Recorded Weights in this Encounter    06/10/18 0505 18 0400 18 6013 Weight: 321 lb 14 oz (146 kg) 320 lb 12.3 oz (145.5 kg) 316 lb 8 oz (143.6 kg)         06/10 1901 - 06/12 0700  In: 1180 [P.O.:1180]  Out: 1495 [facility] reports congested cough- non productive. SOB unchanged. + cough  Mild chest pressure occasionally, less frequent now.          Current Facility-Administered Medications   Medication Dose Route Frequency    warfarin (COUMADIN) tablet 3 mg  3 mg Oral ONCE    lidocaine-prilocaine (EMLA) 2.5-2.5 % cream   Topical PRN    predniSONE (DELTASONE) tablet 20 mg  20 mg Oral DAILY WITH BREAKFAST    insulin NPH (NOVOLIN N, HUMULIN N) injection 15 Units  15 Units SubCUTAneous Q12H    prochlorperazine (COMPAZINE) injection 5 mg  5 mg IntraVENous Q8H PRN    albuterol-ipratropium (DUO-NEB) 2.5 MG-0.5 MG/3 ML  3 mL Nebulization Q8H RT    labetalol (NORMODYNE;TRANDATE) injection 20 mg  20 mg IntraVENous Q4H PRN    hydrALAZINE (APRESOLINE) tablet 100 mg  100 mg Oral TID    doxycycline (VIBRA-TABS) tablet 100 mg  100 mg Oral Q12H    sodium chloride (OCEAN) 0.65 % nasal squeeze bottle 2 Spray  2 Spray Both Nostrils Q2H PRN    HYDROmorphone (DILAUDID) syringe 0.5 mg  0.5 mg IntraVENous Q4H PRN    metOLazone (ZAROXOLYN) tablet 5 mg  5 mg Oral DAILY    epoetin charlie (EPOGEN;PROCRIT) injection 10,000 Units  10,000 Units SubCUTAneous Once per day on Thu Sat    ondansetron (ZOFRAN ODT) tablet 8 mg  8 mg Oral Q8H PRN    magnesium oxide (MAG-OX) tablet 400 mg  400 mg Oral BID    sodium chloride (NS) flush 5-10 mL  5-10 mL IntraVENous Q8H    sodium chloride (NS) flush 5-10 mL  5-10 mL IntraVENous PRN    bumetanide (BUMEX) injection 2 mg  2 mg IntraVENous BID    0.9% sodium chloride infusion 250 mL  250 mL IntraVENous PRN    nitroglycerin (NITROSTAT) tablet 0.4 mg  0.4 mg SubLINGual PRN    oxyCODONE-acetaminophen (PERCOCET 7.5) 7.5-325 mg per tablet 1 Tab  1 Tab Oral Q6H PRN    Warfarin: pharmacy to dose    Other Rx Dosing/Monitoring    amLODIPine (NORVASC) tablet 10 mg  10 mg Oral DAILY    albuterol (PROVENTIL VENTOLIN) nebulizer solution 2.5 mg  2.5 mg Nebulization Q6H PRN    allopurinol (ZYLOPRIM) tablet 100 mg  100 mg Oral DAILY    aspirin delayed-release tablet 81 mg  81 mg Oral DAILY    atorvastatin (LIPITOR) tablet 80 mg  80 mg Oral QHS    calcitRIOL (ROCALTROL) capsule 0.25 mcg  0.25 mcg Oral Q M, W, F & SAT    carvedilol (COREG) tablet 25 mg  25 mg Oral BID WITH MEALS    ergocalciferol (ERGOCALCIFEROL) capsule 50,000 Units  50,000 Units Oral every Tuesday    fluticasone (FLONASE) 50 mcg/actuation nasal spray 2 Spray  2 Spray Both Nostrils DAILY PRN    hydrOXYzine HCl (ATARAX) tablet 25 mg  25 mg Oral TID PRN    isosorbide mononitrate ER (IMDUR) tablet 120 mg  120 mg Oral DAILY    pantoprazole (PROTONIX) tablet 40 mg  40 mg Oral ACB    polyethylene glycol (MIRALAX) packet 17 g  17 g Oral DAILY    senna-docusate (PERICOLACE) 8.6-50 mg per tablet 1 Tab  1 Tab Oral DAILY    sodium chloride (NS) flush 5-10 mL  5-10 mL IntraVENous Q8H    sodium chloride (NS) flush 5-10 mL  5-10 mL IntraVENous PRN    naloxone (NARCAN) injection 0.4 mg  0.4 mg IntraVENous PRN    glucose chewable tablet 16 g  4 Tab Oral PRN    dextrose (D50W) injection syrg 12.5-25 g  12.5-25 g IntraVENous PRN    glucagon (GLUCAGEN) injection 1 mg  1 mg IntraMUSCular PRN    acetaminophen (TYLENOL) tablet 650 mg  650 mg Oral Q4H PRN    diphenhydrAMINE (BENADRYL) capsule 25 mg  25 mg Oral Q4H PRN    insulin lispro (HUMALOG) injection   SubCUTAneous AC&HS      OBJECTIVE               Intake/Output Summary (Last 24 hours) at 06/12/18 1020  Last data filed at 06/12/18 0458   Gross per 24 hour   Intake             1180 ml   Output             1700 ml   Net             -520 ml       Review of Systems - History obtained from the patient AS PER  HPI    Telemetry NSR    PHYSICAL EXAM        Visit Vitals    /68 (BP 1 Location: Right arm, BP Patient Position: At rest)    Pulse 69    Temp 98.4 °F (36.9 °C)    Resp 16    Ht 5' 10\" (1.778 m)    Wt 316 lb 8 oz (143.6 kg)    SpO2 97%    BMI 45.41 kg/m2       Gen: Well-developed, obese, in no acute distress  alert and oriented x 3  HEENT:  Pink conjunctivae, Hearing grossly normal.No scleral icterus or conjunctival, moist mucous membranes  Neck: Supple, JVD difficult to appreciate  Resp: No accessory muscle use, diminished BS steph rhonchi bilateral  Card: Regular Rate,Rythm, No murmurs, rubs or gallop. No thrills. GI:          soft, non-tender   MSK: No cyanosis or clubbing, good capillary refill  Skin: No rashes or ulcers, no bruising. Neuro:  Cranial nerves are grossly intact, moving all four extremities, no focal deficit, follows commands appropriately  Psych:  Good insight, oriented to person, place and time, alert, Nml Affect  LE: +1 pitting edema. AV fistula LUE       DATA REVIEW            Laboratory and Imaging have been reviewed by me and are notable for  No results for input(s): CPK, CKMB, TROIQ in the last 72 hours.   Recent Labs      06/12/18   0440  06/11/18   0407  06/10/18   0453   NA  134*  135*  136   K  4.6  4.7  4.6   CO2  25  25  25   BUN  93*  102*  98*   CREA  4.21*  4.57*  4.14*   GLU  169*  192*  148*   PHOS  4.9*  4.9*  4.6   MG   --    --   2.2   WBC  15.6*   --   9.0   HGB  9.5*   --   8.5*   HCT  30.2*   --   26.7*   PLT  255   --   [address], NP

## 2018-06-12 NOTE — PROGRESS NOTES
Cleared by nursing to see patient. PT spoke to patient and she declined therapy. States she just took pain medicine and feels a bit groggy. She does not want to get up now. States she has been up several times today to restroom. PT will follow up tomorrow. Nursing aware.

## 2018-06-12 NOTE — DIALYSIS
[facility]  [phone]    Vitals   Pre   Post   Assessment   Pre   Post     Temp  Temp: 98 °F (36.7 °C) (06/12/18 1125)  98.8 LOC  A/OX3 A/OX3   HR   Pulse (Heart Rate): 68 (06/12/18 1125) 66 Lungs   Coarse  CTA   B/P   BP: 163/81 (HD started) (06/12/18 1125) 145/75 Cardiac   NSR  NSR   Resp   Resp Rate: 16 (06/12/18 1125) 16 Skin   Warm and dry  warm and dry   Pain level  Pain Intensity 1: 0 (06/12/18 0859) 0/10 Edema  Generalized +1      genrralized trace    Orders:    Duration:   Start:   4078 End:   1355 Total:   2.5 hrs   Dialyzer:   Dialyzer/Set Up Inspection: Ines Durbin (06/12/18 1125)   K Bath:   Dialysate K (mEq/L): 3 (06/12/18 1125)   Ca Bath:   Dialysate CA (mEq/L): 2.5 (06/12/18 1125)   Na/Bicarb:   Dialysate NA (mEq/L): 140 (06/12/18 1125)   Target Fluid Removal:   Goal/Amount of Fluid to Remove (mL): 1000 mL (06/11/18 1934)   Access     Type & Location:   LFA AVF, + B/T, cannulated with 17g needles with difficulty stick   Labs     Obtained/Reviewed   Critical Results Called   Date when labs were drawn-  Hgb-    HGB   Date Value Ref Range Status   06/12/2018 9.5 (L) 11.5 - 16.0 g/dL Final     K-    Potassium   Date Value Ref Range Status   06/12/2018 4.6 3.5 - 5.1 mmol/L Final     Ca-   Calcium   Date Value Ref Range Status   06/12/2018 9.8 8.5 - 10.1 MG/DL Final     Bun-   BUN   Date Value Ref Range Status   06/12/2018 93 (H) 6 - 20 MG/DL Final     Creat-   Creatinine   Date Value Ref Range Status   06/12/2018 4.21 (H) 0.55 - 1.02 MG/DL Final        Medications/ Blood Products Given     Name   Dose   Route and Time     None                 Blood Volume Processed (BVP):    42.2 L Net Fluid   Removed:  3000 ml   Comments   Time Out Done: 1120  Primary Nurse Rpt Pre: Sonia Aviles, RN  Primary Nurse Rpt Post: Sonia Aviles RN  Pt Education: the procedure   Care Plan: continue HD tx per ordered   Tx Summary: tolerated tx fair, removed 3000 ml, BP stable, no acute distress, no SOB, all possible blood returned. She was hard stick. Admiting Diagnosis:  Pt's previous clinic-  Consent signed - Informed Consent Verified: Yes (06/12/18 1125)  [facility] Consent - yes   Hepatitis Status- neg 5/12/18  Machine #- Machine Number: [id number] (06/12/18 1125)  Telemetry status-none   Pre-dialysis wt. - Pre-Dialysis Weight: 143.6 kg (316 lb 9.3 oz) (06/12/18 1125)

## 2018-06-12 NOTE — CONSULTS
Palliative Medicine    Goals are clear at this time. Tried again today to offer completion of AMD-- patient is not interested. Will sign off.   Please call as needed [phone]

## 2018-06-12 NOTE — PROGRESS NOTES
Palliative Medicine Social Work Note      SW made supportive visit to pt along with Palliative Medicine Physician Dr. Michelle Altman. No family currently present. Pt was awake in bed watching television, stated she was tired. Pt recalled speaking with Palliative NP re: AMD, stated she does have blank copy in the event she wishes to complete in the future, is not interested in discussing at this time. Pt stated  Octavio Roberto would serve as her surrogate decision maker in the event she is unable to speak for herself. SW will be available for support/assistance, as needed. Thank you for including Palliative Medicine in the care of Ms. Mar Fine.     [address] [place] [address], [facility]  Palliative Medicine:  [phone])

## 2018-06-12 NOTE — PROGRESS NOTES
Bedside and Verbal shift change report given to Dede Joyner. (oncoming nurse) by Neela Domínguez (offgoing nurse). Report included the following information SBAR, Kardex, Procedure Summary, Intake/Output, MAR and Recent Results.

## 2018-06-12 NOTE — PROGRESS NOTES
Consult noted for OP HD. I spoke to with the patient. I have sent referrals to [facility], [facility] and [facility]. I will continue to follow and assist with dc planning.  CHANDA Burris

## 2018-06-12 NOTE — PROGRESS NOTES
[address] [facility] [place]  YOB: 1955          Assessment & Plan:   CKD 4  · ESRD now  · HD started 6 11 18  · Second HD today: 2.5 hrs, low blood flow, EMLA cream at avf  · Hep panel  · QM to look for unit  Resp failure  · Cont loops  · DC Metolazone  · UF with HD  CP  · On oxygen  · Cath 6 5 18     HTN   · 156/87 this am.Amlodipine,Corge,Loops,Hydralazine    SHPT   · Calcitriol    Anemia of CKD   · Will change Epo with HD  · S/p BMB  : normal       Subjective:   CC: f/u CKD  HPI:   CKD:VOLUME control was not achieved despite loops and metolazone, she decided last night that she will accept HD, She had 1st hd yesterday, some difficulty with cannulation.    SOB better  Edema mild  On Calcitriol for 2 PTH  ROS: no n/v/ neg for 10 sys except in HPI  Current Facility-Administered Medications   Medication Dose Route Frequency    warfarin (COUMADIN) tablet 3 mg  3 mg Oral ONCE    lidocaine-prilocaine (EMLA) 2.5-2.5 % cream   Topical PRN    predniSONE (DELTASONE) tablet 20 mg  20 mg Oral DAILY WITH BREAKFAST    insulin NPH (NOVOLIN N, HUMULIN N) injection 15 Units  15 Units SubCUTAneous Q12H    prochlorperazine (COMPAZINE) injection 5 mg  5 mg IntraVENous Q8H PRN    albuterol-ipratropium (DUO-NEB) 2.5 MG-0.5 MG/3 ML  3 mL Nebulization Q8H RT    labetalol (NORMODYNE;TRANDATE) injection 20 mg  20 mg IntraVENous Q4H PRN    hydrALAZINE (APRESOLINE) tablet 100 mg  100 mg Oral TID    doxycycline (VIBRA-TABS) tablet 100 mg  100 mg Oral Q12H    sodium chloride (OCEAN) 0.65 % nasal squeeze bottle 2 Spray  2 Spray Both Nostrils Q2H PRN    HYDROmorphone (DILAUDID) syringe 0.5 mg  0.5 mg IntraVENous Q4H PRN    metOLazone (ZAROXOLYN) tablet 5 mg  5 mg Oral DAILY    epoetin charlie (EPOGEN;PROCRIT) injection 10,000 Units  10,000 Units SubCUTAneous Once per day on Thu Sat    ondansetron (ZOFRAN ODT) tablet 8 mg  8 mg Oral Q8H PRN    magnesium oxide (MAG-OX) tablet 400 mg  400 mg Oral BID    sodium chloride (NS) flush 5-10 mL  5-10 mL IntraVENous Q8H    sodium chloride (NS) flush 5-10 mL  5-10 mL IntraVENous PRN    bumetanide (BUMEX) injection 2 mg  2 mg IntraVENous BID    0.9% sodium chloride infusion 250 mL  250 mL IntraVENous PRN    nitroglycerin (NITROSTAT) tablet 0.4 mg  0.4 mg SubLINGual PRN    oxyCODONE-acetaminophen (PERCOCET 7.5) 7.5-325 mg per tablet 1 Tab  1 Tab Oral Q6H PRN    Warfarin: pharmacy to dose    Other Rx Dosing/Monitoring    amLODIPine (NORVASC) tablet 10 mg  10 mg Oral DAILY    albuterol (PROVENTIL VENTOLIN) nebulizer solution 2.5 mg  2.5 mg Nebulization Q6H PRN    allopurinol (ZYLOPRIM) tablet 100 mg  100 mg Oral DAILY    aspirin delayed-release tablet 81 mg  81 mg Oral DAILY    atorvastatin (LIPITOR) tablet 80 mg  80 mg Oral QHS    calcitRIOL (ROCALTROL) capsule 0.25 mcg  0.25 mcg Oral Q M, W, F & SAT    carvedilol (COREG) tablet 25 mg  25 mg Oral BID WITH MEALS    ergocalciferol (ERGOCALCIFEROL) capsule 50,000 Units  50,000 Units Oral every Tuesday    fluticasone (FLONASE) 50 mcg/actuation nasal spray 2 Spray  2 Spray Both Nostrils DAILY PRN    hydrOXYzine HCl (ATARAX) tablet 25 mg  25 mg Oral TID PRN    isosorbide mononitrate ER (IMDUR) tablet 120 mg  120 mg Oral DAILY    pantoprazole (PROTONIX) tablet 40 mg  40 mg Oral ACB    polyethylene glycol (MIRALAX) packet 17 g  17 g Oral DAILY    senna-docusate (PERICOLACE) 8.6-50 mg per tablet 1 Tab  1 Tab Oral DAILY    sodium chloride (NS) flush 5-10 mL  5-10 mL IntraVENous Q8H    sodium chloride (NS) flush 5-10 mL  5-10 mL IntraVENous PRN    naloxone (NARCAN) injection 0.4 mg  0.4 mg IntraVENous PRN    glucose chewable tablet 16 g  4 Tab Oral PRN    dextrose (D50W) injection syrg 12.5-25 g  12.5-25 g IntraVENous PRN    glucagon (GLUCAGEN) injection 1 mg  1 mg IntraMUSCular PRN    acetaminophen (TYLENOL) tablet 650 mg  650 mg Oral Q4H PRN    diphenhydrAMINE (BENADRYL) capsule 25 mg  25 mg Oral Q4H PRN    insulin lispro (HUMALOG) injection   SubCUTAneous AC&HS          Objective:     Vitals:  Blood pressure 156/87, pulse 63, temperature 98 °F (36.7 °C), resp. rate 14, height 5' 10\" (1.778 m), weight 143.6 kg (316 lb 8 oz), SpO2 100 %. Temp (24hrs), Av.2 °F (36.8 °C), Min:98 °F (36.7 °C), Max:98.4 °F (36.9 °C)      Intake and Output:     06/10 1901 -  0700  In: 1180 [P.O.:1180]  Out: 1700 [Urine:500]    Physical Exam:               GENERAL ASSESSMENT: NAD  CHEST: CTA  HEART: S1S2  ABDOMEN: Soft,NT  SKIN DRY  JT: No acute findings  EXTREMITY: trace EDEMA          Data Review      No results for input(s): TNIPOC in the last 72 hours. No lab exists for component: ITNL   No results for input(s): CPK, CKMB, TROIQ in the last 72 hours. Recent Labs      06/12/18   0440  06/11/18   0407  06/10/18   0453   NA  134*  135*  136   K  4.6  4.7  4.6   CL  100  100  102   CO2    25   BUN  93*  102*  98*   CREA  4.21*  4.57*  4.14*   GLU  169*  192*  148*   PHOS  4.9*  4.9*  4.6   MG   --    --   2.2   CA  9.8  9.2  9.4   ALB  3.6  3.6  3.5   WBC  15.6*   --   9.0   HGB  9.5*   --   8.5*   HCT  30.2*   --   26.7*   PLT  255   --   180      Recent Labs      06/12/18   0440  06/11/18   0407  06/10/18   0453   INR  2.2*  2.5*  2.6*   PTP  21.8*  24.8*  26.2*     Needs: urine analysis, urine sodium, protein and creatinine  Lab Results   Component Value Date/Time    Sodium urine, random 25 11/10/2014 12:40 PM    Creatinine, urine 145.29 2017 05:01 AM         : Jovita Harmon MD  2018        [facility]:  [email or web address]  [place] office:  [address], [address] [place] [address],[address]  [place], [address] [place] [address]  Phone: [phone]  Fax :     [phone]    [address] [place] [address] office:  [address], [address] [facility]  Phone - [phone]  Fax - [phone]

## 2018-06-12 NOTE — PROGRESS NOTES
SHIFT CHANGE:  1930 Bedside and Verbal shift change report given to Gemma EVANS (oncoming nurse) by Jade Rodriguez (offgoing nurse). Report included the following information SBAR, Kardex, MAR and Recent Results. SHIFT SUMMARY:  2031  Patient nauseated, receiving dialysis at this time. Left message for Dr. Nadya Huitron to call back. Not able to get PO zofran as this was given at 1700.  2036  Dr. Nadya Huitron gave order for IV compazine 5 mg every 8 hours as needed. 2300  Patient feeling nauseated and tired. Refused all PM medications including her NPH insulin. Will offer again through the evening. Dr. Nadya Huitron notified. 4515  Patient slept off and on using cpap machine. Tolerated apple juice this am.  Blood work drawn. Visit Vitals    /87 (BP 1 Location: Right arm, BP Patient Position: At rest)    Pulse 61    Temp 98 °F (36.7 °C)    Resp 14    Ht 5' 10\" (1.778 m)    Wt 143.6 kg (316 lb 8 oz)    SpO2 100%    BMI 45.41 kg/m2         END OF SHIFT REPORT:  0730  Bedside and Verbal shift change report given to Adamaris EVANS (oncoming nurse) by Suleiman Serna RN (offgoing nurse). Report included the following information SBAR, Kardex, MAR and Recent Results.

## 2018-06-12 NOTE — PROGRESS NOTES
Ezio Craig [facility] [place] [address]  [address] [facility], [place], [address] [place] [address]  [phone]      Medical Progress Note      NAME: Kesha Garduno   :  1955  MRM:  [id number]    Date/Time: 2018  7:26 AM       Assessment and Plan:   1. Chronic respiratory failure with hypoxia / COPD, severe / ILD (interstitial lung disease) / Pulmonary HTN - continue diuretics. Palliative care consulted. Continue Oxygen as needed. CT scan of the chest from  noted. Continue bumex. Pulmonary signed off. Wean steroid.       2. Parainfluenza virus - Tested positive on resp screen.  Supportive care.        3. Chronic diastolic heart failure - Continue bumex at higher dose and metolazone, strict I's and O's, daily weights, low salt diet. Cardiology following       4. DM (diabetes mellitus), type 2 with renal, vascular and neurological complications - Diabetic diet and counseling.  SSI per protocol. Started on NPH.     5. CKD (chronic kidney disease) stage 4 - Nephrology following. Started HD on .       6. CAD (coronary Artery Disease) Native Artery / HTN (hypertension) / Chest pain / Dyspnea - Pain chronic, unlikely AMI. Cath failed. Continue ASA, statin, BB, Imdur, as medical management.  No further plans for cath or workup.      7. Gastroparesis / Esophageal reflux - PPI.       8. Morbid obesity / JASEN on CPAP - continue CPAP, advise weight loss       9. Normocytic anemia - POA, heme/onc consulted. Onofre Barrett just chronic disease. s/p BMB, pathology negative. EPO per renal.      10. Hx of deep venous thrombosis / Warfarin anticoagulation - continue  Coumadin       11. Gout - stable on allopurinol                Subjective:     Chief Complaint:  Follow up of pt who was admitted with respiratory failure. C/o SOB     ROS:  (bold if positive, if negative)      Tolerating PT  Tolerating Diet        Objective:     Last 24hrs VS reviewed since prior progress note.  Most recent are:    Visit Vitals    BP 156/87 (BP 1 Location: Right arm, BP Patient Position: At rest)    Pulse 61    Temp 98 °F (36.7 °C)    Resp 14    Ht 5' 10\" (1.778 m)    Wt 143.6 kg (316 lb 8 oz)    SpO2 100%    BMI 45.41 kg/m2     SpO2 Readings from Last 6 Encounters:   06/12/18 100%   05/15/18 91%   04/03/18 96%   03/20/18 98%   01/24/18 98%   01/10/18 95%    O2 Flow Rate (L/min): 2 l/min       Intake/Output Summary (Last 24 hours) at 06/12/18 0706  Last data filed at 06/12/18 0458   Gross per 24 hour   Intake             1180 ml   Output             1700 ml   Net             -520 ml        Physical Exam:    Gen:  obese, in no acute distress  HEENT:  Pink conjunctivae, PERRL, hearing intact to voice, moist mucous membranes  Neck:  Supple, without masses, thyroid non-tender  Resp:  No accessory muscle use, clear breath sounds without wheezes rales or rhonchi  Card:  No murmurs, normal S1, S2 without thrills, bruits or peripheral edema  Abd:  Soft, non-tender, non-distended, normoactive bowel sounds are present, no palpable organomegaly and no detectable hernias  Lymph:  No cervical or inguinal adenopathy  Musc:  No cyanosis or clubbing  Skin:  No rashes or ulcers, skin turgor is good  Neuro:  Cranial nerves are grossly intact, no focal motor weakness, follows commands appropriately  Psych:  Good insight, oriented to person, place and time, alert  __________________________________________________________________  Medications Reviewed: (see below)  Medications:     Current Facility-Administered Medications   Medication Dose Route Frequency    predniSONE (DELTASONE) tablet 20 mg  20 mg Oral DAILY WITH BREAKFAST    insulin NPH (NOVOLIN N, HUMULIN N) injection 15 Units  15 Units SubCUTAneous Q12H    prochlorperazine (COMPAZINE) injection 5 mg  5 mg IntraVENous Q8H PRN    albuterol-ipratropium (DUO-NEB) 2.5 MG-0.5 MG/3 ML  3 mL Nebulization Q8H RT    labetalol (NORMODYNE;TRANDATE) injection 20 mg  20 mg IntraVENous Q4H PRN    hydrALAZINE (APRESOLINE) tablet 100 mg  100 mg Oral TID    doxycycline (VIBRA-TABS) tablet 100 mg  100 mg Oral Q12H    sodium chloride (OCEAN) 0.65 % nasal squeeze bottle 2 Spray  2 Spray Both Nostrils Q2H PRN    HYDROmorphone (DILAUDID) syringe 0.5 mg  0.5 mg IntraVENous Q4H PRN    metOLazone (ZAROXOLYN) tablet 5 mg  5 mg Oral DAILY    epoetin charlie (EPOGEN;PROCRIT) injection 10,000 Units  10,000 Units SubCUTAneous Once per day on Thu Sat    ondansetron (ZOFRAN ODT) tablet 8 mg  8 mg Oral Q8H PRN    magnesium oxide (MAG-OX) tablet 400 mg  400 mg Oral BID    sodium chloride (NS) flush 5-10 mL  5-10 mL IntraVENous Q8H    sodium chloride (NS) flush 5-10 mL  5-10 mL IntraVENous PRN    bumetanide (BUMEX) injection 2 mg  2 mg IntraVENous BID    0.9% sodium chloride infusion 250 mL  250 mL IntraVENous PRN    nitroglycerin (NITROSTAT) tablet 0.4 mg  0.4 mg SubLINGual PRN    oxyCODONE-acetaminophen (PERCOCET 7.5) 7.5-325 mg per tablet 1 Tab  1 Tab Oral Q6H PRN    Warfarin: pharmacy to dose    Other Rx Dosing/Monitoring    amLODIPine (NORVASC) tablet 10 mg  10 mg Oral DAILY    albuterol (PROVENTIL VENTOLIN) nebulizer solution 2.5 mg  2.5 mg Nebulization Q6H PRN    allopurinol (ZYLOPRIM) tablet 100 mg  100 mg Oral DAILY    aspirin delayed-release tablet 81 mg  81 mg Oral DAILY    atorvastatin (LIPITOR) tablet 80 mg  80 mg Oral QHS    calcitRIOL (ROCALTROL) capsule 0.25 mcg  0.25 mcg Oral Q M, W, F & SAT    carvedilol (COREG) tablet 25 mg  25 mg Oral BID WITH MEALS    ergocalciferol (ERGOCALCIFEROL) capsule 50,000 Units  50,000 Units Oral every Tuesday    fluticasone (FLONASE) 50 mcg/actuation nasal spray 2 Spray  2 Spray Both Nostrils DAILY PRN    hydrOXYzine HCl (ATARAX) tablet 25 mg  25 mg Oral TID PRN    isosorbide mononitrate ER (IMDUR) tablet 120 mg  120 mg Oral DAILY    pantoprazole (PROTONIX) tablet 40 mg  40 mg Oral ACB    polyethylene glycol (MIRALAX) packet 17 g  17 g Oral DAILY    senna-docusate (PERICOLACE) 8.6-50 mg per tablet 1 Tab  1 Tab Oral DAILY    sodium chloride (NS) flush 5-10 mL  5-10 mL IntraVENous Q8H    sodium chloride (NS) flush 5-10 mL  5-10 mL IntraVENous PRN    naloxone (NARCAN) injection 0.4 mg  0.4 mg IntraVENous PRN    glucose chewable tablet 16 g  4 Tab Oral PRN    dextrose (D50W) injection syrg 12.5-25 g  12.5-25 g IntraVENous PRN    glucagon (GLUCAGEN) injection 1 mg  1 mg IntraMUSCular PRN    acetaminophen (TYLENOL) tablet 650 mg  650 mg Oral Q4H PRN    diphenhydrAMINE (BENADRYL) capsule 25 mg  25 mg Oral Q4H PRN    insulin lispro (HUMALOG) injection   SubCUTAneous AC&HS        Lab Data Reviewed: (see below)  Lab Review:     Recent Labs      06/12/18   0440  06/10/18   0453   WBC  15.6*  9.0   HGB  9.5*  8.5*   HCT  30.2*  26.7*   PLT  255  180     Recent Labs      06/12/18   0440  06/11/18   0407  06/10/18   0453   NA  134*  135*  136   K  4.6  4.7  4.6   CL  100  100  102   CO2  25  25  25   GLU  169*  192*  148*   BUN  93*  102*  98*   CREA  4.21*  4.57*  4.14*   CA  9.8  9.2  9.4   MG   --    --   2.2   PHOS  4.9*  4.9*  4.6   ALB  3.6  3.6  3.5   INR  2.2*  2.5*  2.6*     Lab Results   Component Value Date/Time    Glucose (POC) 193 (H) 06/11/2018 09:10 PM    Glucose (POC) 350 (H) 06/11/2018 04:39 PM    Glucose (POC) 196 (H) 06/11/2018 12:04 PM    Glucose (POC) 201 (H) 06/11/2018 07:41 AM    Glucose (POC) 337 (H) 06/10/2018 09:24 PM     No results for input(s): PH, PCO2, PO2, HCO3, FIO2 in the last 72 hours. Recent Labs      06/12/18   0440  06/11/18   0407  06/10/18   0453   INR  2.2*  2.5*  2.6*     All Micro Results     Procedure Component Value Units Date/Time    RAYSHAWN Banuelos, UR/CSF [[id number]] Collected:  06/08/18 2304    Order Status:  Completed Specimen:  Other from Miscellaneous sample Updated:  06/09/18 1236     Source URINE        Specimen Urine     Streptococcus pneumoniae Ag NEGATIVE         Fluid culture Not Indicated     Organism ID Not indicated. Please note Comment         (NOTE)  College of [employer]ologists standards require a culture to be  performed on CSF specimens submitted for bacterial antigen testing. (CAP [id number]) Urine specimens will not be cultured. Performed At: [address] [id number]  Micah Almeida MD MK:[id number]         RESPIRATORY Rock Hernandez [[id number]]  (Abnormal) Collected:  06/08/18 0824    Order Status:  Completed Specimen:  Nasopharyngeal Updated:  06/08/18 1247     Adenovirus NOT DETECTED        Coronavirus 229E NOT DETECTED        Coronavirus HKU1 NOT DETECTED        Coronavirus CVNL63 NOT DETECTED        Coronavirus OC43 NOT DETECTED        Metapneumovirus NOT DETECTED        Rhinovirus and Enterovirus NOT DETECTED        Influenza A NOT DETECTED        Influenza A, subtype H1 NOT DETECTED        Influenza A, subtype H3 NOT DETECTED        INFLUENZA A H1N1 PCR NOT DETECTED        Influenza B NOT DETECTED        Parainfluenza 1 NOT DETECTED        Parainfluenza 2 NOT DETECTED        Parainfluenza 3 DETECTED (A)        Parainfluenza virus 4 NOT DETECTED        RSV by PCR NOT DETECTED        Bordetella pertussis - PCR NOT DETECTED        Chlamydophila pneumoniae DNA, QL, PCR NOT DETECTED        Mycoplasma pneumoniae DNA, QL, PCR NOT DETECTED       URINE CULTURE HOLD SAMPLE [[id number]] Collected:  06/08/18 0826    Order Status:  Completed Specimen:  Serum Updated:  06/08/18 0835     Urine culture hold         URINE ON HOLD IN MICROBIOLOGY DEPT FOR 3 DAYS. IF UNPRESERVED URINE IS SUBMITTED, IT CANNOT BE USED FOR ADDITIONAL TESTING AFTER 24 HRS, RECOLLECTION WILL BE REQUIRED. I have reviewed notes of prior 24hr. Other pertinent lab:       Total time spent with patient: [address] discussed with: Patient, Family, Nursing Staff and >50% of time spent in counseling and coordination of care    Discussed:  Care Plan    Prophylaxis: warfarin Disposition:  Home w/Family           ___________________________________________________    Attending Physician: Darien Jerome MD

## 2018-06-13 ENCOUNTER — APPOINTMENT (OUTPATIENT)
Dept: GENERAL RADIOLOGY | Age: 63
DRG: 193 | End: 2018-06-13
Attending: NURSE PRACTITIONER
Payer: MEDICARE

## 2018-06-13 ENCOUNTER — APPOINTMENT (OUTPATIENT)
Dept: INTERVENTIONAL RADIOLOGY/VASCULAR | Age: 63
DRG: 193 | End: 2018-06-13
Attending: INTERNAL MEDICINE
Payer: MEDICARE

## 2018-06-13 ENCOUNTER — APPOINTMENT (OUTPATIENT)
Dept: GENERAL RADIOLOGY | Age: 63
DRG: 193 | End: 2018-06-13
Attending: RADIOLOGY
Payer: MEDICARE

## 2018-06-13 LAB
ALBUMIN SERPL-MCNC: 3.3 G/DL (ref 3.5–5)
ANION GAP SERPL CALC-SCNC: 4 MMOL/L (ref 5–15)
BASOPHILS # BLD: 0 K/UL (ref 0–0.1)
BASOPHILS NFR BLD: 0 % (ref 0–1)
BUN SERPL-MCNC: 67 MG/DL (ref 6–20)
BUN/CREAT SERPL: 19 (ref 12–20)
CALCIUM SERPL-MCNC: 8.8 MG/DL (ref 8.5–10.1)
CHLORIDE SERPL-SCNC: 101 MMOL/L (ref 97–108)
CO2 SERPL-SCNC: 30 MMOL/L (ref 21–32)
CREAT SERPL-MCNC: 3.6 MG/DL (ref 0.55–1.02)
DIFFERENTIAL METHOD BLD: ABNORMAL
EOSINOPHIL # BLD: 0 K/UL (ref 0–0.4)
EOSINOPHIL NFR BLD: 0 % (ref 0–7)
ERYTHROCYTE [DISTWIDTH] IN BLOOD BY AUTOMATED COUNT: 16.3 % (ref 11.5–14.5)
GLUCOSE BLD STRIP.AUTO-MCNC: 136 MG/DL (ref 65–100)
GLUCOSE BLD STRIP.AUTO-MCNC: 143 MG/DL (ref 65–100)
GLUCOSE BLD STRIP.AUTO-MCNC: 159 MG/DL (ref 65–100)
GLUCOSE BLD STRIP.AUTO-MCNC: 170 MG/DL (ref 65–100)
GLUCOSE BLD STRIP.AUTO-MCNC: 204 MG/DL (ref 65–100)
GLUCOSE BLD STRIP.AUTO-MCNC: 352 MG/DL (ref 65–100)
GLUCOSE SERPL-MCNC: 167 MG/DL (ref 65–100)
HAV IGM SER QL: NONREACTIVE
HBV CORE IGM SER QL: NONREACTIVE
HBV SURFACE AG SER QL: 0.32 INDEX
HBV SURFACE AG SER QL: NEGATIVE
HCT VFR BLD AUTO: 29.1 % (ref 35–47)
HCV AB SERPL QL IA: NONREACTIVE
HCV COMMENT,HCGAC: NORMAL
HGB BLD-MCNC: 9.2 G/DL (ref 11.5–16)
IMM GRANULOCYTES # BLD: 0 K/UL
IMM GRANULOCYTES NFR BLD AUTO: 0 %
INR PPP: 2.2 (ref 0.9–1.1)
LYMPHOCYTES # BLD: 3.6 K/UL (ref 0.8–3.5)
LYMPHOCYTES NFR BLD: 23 % (ref 12–49)
MCH RBC QN AUTO: 29.8 PG (ref 26–34)
MCHC RBC AUTO-ENTMCNC: 31.6 G/DL (ref 30–36.5)
MCV RBC AUTO: 94.2 FL (ref 80–99)
MONOCYTES # BLD: 0.9 K/UL (ref 0–1)
MONOCYTES NFR BLD: 6 % (ref 5–13)
NEUTS SEG # BLD: 11 K/UL (ref 1.8–8)
NEUTS SEG NFR BLD: 71 % (ref 32–75)
NRBC # BLD: 0.03 K/UL (ref 0–0.01)
NRBC BLD-RTO: 0.2 PER 100 WBC
PHOSPHATE SERPL-MCNC: 3.7 MG/DL (ref 2.6–4.7)
PLATELET # BLD AUTO: 252 K/UL (ref 150–400)
PLATELET COMMENTS,PCOM: ABNORMAL
PMV BLD AUTO: 10.5 FL (ref 8.9–12.9)
POTASSIUM SERPL-SCNC: 4.3 MMOL/L (ref 3.5–5.1)
PROTHROMBIN TIME: 22.1 SEC (ref 9–11.1)
RBC # BLD AUTO: 3.09 M/UL (ref 3.8–5.2)
RBC MORPH BLD: ABNORMAL
SERVICE CMNT-IMP: ABNORMAL
SODIUM SERPL-SCNC: 135 MMOL/L (ref 136–145)
SP1: NORMAL
SP2: NORMAL
SP3: NORMAL
WBC # BLD AUTO: 15.5 K/UL (ref 3.6–11)

## 2018-06-13 PROCEDURE — 85610 PROTHROMBIN TIME: CPT | Performed by: NURSE PRACTITIONER

## 2018-06-13 PROCEDURE — 77010033678 HC OXYGEN DAILY

## 2018-06-13 PROCEDURE — 97530 THERAPEUTIC ACTIVITIES: CPT

## 2018-06-13 PROCEDURE — 74011250637 HC RX REV CODE- 250/637: Performed by: INTERNAL MEDICINE

## 2018-06-13 PROCEDURE — 74011250636 HC RX REV CODE- 250/636: Performed by: INTERNAL MEDICINE

## 2018-06-13 PROCEDURE — 05HM33Z INSERTION OF INFUSION DEVICE INTO RIGHT INTERNAL JUGULAR VEIN, PERCUTANEOUS APPROACH: ICD-10-PCS | Performed by: RADIOLOGY

## 2018-06-13 PROCEDURE — 71045 X-RAY EXAM CHEST 1 VIEW: CPT

## 2018-06-13 PROCEDURE — 36415 COLL VENOUS BLD VENIPUNCTURE: CPT | Performed by: NURSE PRACTITIONER

## 2018-06-13 PROCEDURE — 90935 HEMODIALYSIS ONE EVALUATION: CPT

## 2018-06-13 PROCEDURE — 74011636637 HC RX REV CODE- 636/637: Performed by: PHYSICIAN ASSISTANT

## 2018-06-13 PROCEDURE — 94640 AIRWAY INHALATION TREATMENT: CPT

## 2018-06-13 PROCEDURE — 74011000250 HC RX REV CODE- 250: Performed by: NURSE PRACTITIONER

## 2018-06-13 PROCEDURE — 65660000000 HC RM CCU STEPDOWN

## 2018-06-13 PROCEDURE — 74011636637 HC RX REV CODE- 636/637: Performed by: INTERNAL MEDICINE

## 2018-06-13 PROCEDURE — 74011250637 HC RX REV CODE- 250/637: Performed by: NURSE PRACTITIONER

## 2018-06-13 PROCEDURE — 74011000250 HC RX REV CODE- 250: Performed by: INTERNAL MEDICINE

## 2018-06-13 PROCEDURE — 80069 RENAL FUNCTION PANEL: CPT | Performed by: INTERNAL MEDICINE

## 2018-06-13 PROCEDURE — 85025 COMPLETE CBC W/AUTO DIFF WBC: CPT | Performed by: INTERNAL MEDICINE

## 2018-06-13 PROCEDURE — 76937 US GUIDE VASCULAR ACCESS: CPT

## 2018-06-13 PROCEDURE — 82962 GLUCOSE BLOOD TEST: CPT

## 2018-06-13 PROCEDURE — 74011250636 HC RX REV CODE- 250/636: Performed by: RADIOLOGY

## 2018-06-13 RX ORDER — HEPARIN SODIUM 1000 [USP'U]/ML
5000 INJECTION, SOLUTION INTRAVENOUS; SUBCUTANEOUS
Status: DISCONTINUED | OUTPATIENT
Start: 2018-06-13 | End: 2018-06-15 | Stop reason: HOSPADM

## 2018-06-13 RX ORDER — LIDOCAINE HYDROCHLORIDE 10 MG/ML
15 INJECTION, SOLUTION EPIDURAL; INFILTRATION; INTRACAUDAL; PERINEURAL ONCE
Status: COMPLETED | OUTPATIENT
Start: 2018-06-13 | End: 2018-06-13

## 2018-06-13 RX ADMIN — ASPIRIN 81 MG: 81 TABLET, COATED ORAL at 09:09

## 2018-06-13 RX ADMIN — LIDOCAINE HYDROCHLORIDE 5 ML: 10 INJECTION, SOLUTION EPIDURAL; INFILTRATION; INTRACAUDAL; PERINEURAL at 11:34

## 2018-06-13 RX ADMIN — ISOSORBIDE MONONITRATE 120 MG: 60 TABLET ORAL at 09:09

## 2018-06-13 RX ADMIN — ALLOPURINOL 100 MG: 100 TABLET ORAL at 09:07

## 2018-06-13 RX ADMIN — Medication 10 ML: at 12:49

## 2018-06-13 RX ADMIN — Medication 400 MG: at 19:45

## 2018-06-13 RX ADMIN — PROCHLORPERAZINE EDISYLATE 5 MG: 5 INJECTION INTRAMUSCULAR; INTRAVENOUS at 00:56

## 2018-06-13 RX ADMIN — BUMETANIDE 2 MG: 0.25 INJECTION INTRAMUSCULAR; INTRAVENOUS at 09:10

## 2018-06-13 RX ADMIN — ERYTHROPOIETIN 15000 UNITS: 10000 INJECTION, SOLUTION INTRAVENOUS; SUBCUTANEOUS at 17:49

## 2018-06-13 RX ADMIN — HUMAN INSULIN 15 UNITS: 100 INJECTION, SUSPENSION SUBCUTANEOUS at 22:00

## 2018-06-13 RX ADMIN — Medication 10 ML: at 12:48

## 2018-06-13 RX ADMIN — HYDRALAZINE HYDROCHLORIDE 100 MG: 25 TABLET ORAL at 16:00

## 2018-06-13 RX ADMIN — PANTOPRAZOLE SODIUM 40 MG: 40 TABLET, DELAYED RELEASE ORAL at 06:47

## 2018-06-13 RX ADMIN — PREDNISONE 20 MG: 20 TABLET ORAL at 09:09

## 2018-06-13 RX ADMIN — HYDROMORPHONE HYDROCHLORIDE 0.5 MG: 1 INJECTION, SOLUTION INTRAMUSCULAR; INTRAVENOUS; SUBCUTANEOUS at 23:04

## 2018-06-13 RX ADMIN — Medication 10 ML: at 22:01

## 2018-06-13 RX ADMIN — HEPARIN SODIUM 2500 UNITS: 1000 INJECTION, SOLUTION INTRAVENOUS; SUBCUTANEOUS at 11:52

## 2018-06-13 RX ADMIN — Medication 400 MG: at 09:09

## 2018-06-13 RX ADMIN — Medication 10 ML: at 05:08

## 2018-06-13 RX ADMIN — INSULIN LISPRO 3 UNITS: 100 INJECTION, SOLUTION INTRAVENOUS; SUBCUTANEOUS at 03:00

## 2018-06-13 RX ADMIN — CARVEDILOL 25 MG: 12.5 TABLET, FILM COATED ORAL at 09:08

## 2018-06-13 RX ADMIN — INSULIN LISPRO 4 UNITS: 100 INJECTION, SOLUTION INTRAVENOUS; SUBCUTANEOUS at 12:46

## 2018-06-13 RX ADMIN — IPRATROPIUM BROMIDE AND ALBUTEROL SULFATE 3 ML: .5; 3 SOLUTION RESPIRATORY (INHALATION) at 07:47

## 2018-06-13 RX ADMIN — POLYETHYLENE GLYCOL (3350) 17 G: 17 POWDER, FOR SOLUTION ORAL at 09:10

## 2018-06-13 RX ADMIN — INSULIN LISPRO 8 UNITS: 100 INJECTION, SOLUTION INTRAVENOUS; SUBCUTANEOUS at 22:00

## 2018-06-13 RX ADMIN — ONDANSETRON 8 MG: 4 TABLET, ORALLY DISINTEGRATING ORAL at 21:59

## 2018-06-13 RX ADMIN — HUMAN INSULIN 15 UNITS: 100 INJECTION, SUSPENSION SUBCUTANEOUS at 09:12

## 2018-06-13 RX ADMIN — HYDRALAZINE HYDROCHLORIDE 100 MG: 25 TABLET ORAL at 09:09

## 2018-06-13 RX ADMIN — HYDRALAZINE HYDROCHLORIDE 100 MG: 25 TABLET ORAL at 22:00

## 2018-06-13 RX ADMIN — IPRATROPIUM BROMIDE AND ALBUTEROL SULFATE 3 ML: .5; 3 SOLUTION RESPIRATORY (INHALATION) at 22:29

## 2018-06-13 RX ADMIN — CALCITRIOL 0.25 MCG: 0.25 CAPSULE, LIQUID FILLED ORAL at 09:08

## 2018-06-13 RX ADMIN — Medication 10 ML: at 22:00

## 2018-06-13 RX ADMIN — METOLAZONE 5 MG: 5 TABLET ORAL at 09:08

## 2018-06-13 RX ADMIN — ACETAMINOPHEN 650 MG: 325 TABLET ORAL at 22:00

## 2018-06-13 RX ADMIN — NITROGLYCERIN 0.4 MG: 0.4 TABLET SUBLINGUAL at 00:51

## 2018-06-13 RX ADMIN — ATORVASTATIN CALCIUM 80 MG: 20 TABLET, FILM COATED ORAL at 22:00

## 2018-06-13 RX ADMIN — STANDARDIZED SENNA CONCENTRATE AND DOCUSATE SODIUM 1 TABLET: 8.6; 5 TABLET, FILM COATED ORAL at 09:09

## 2018-06-13 RX ADMIN — AMLODIPINE BESYLATE 10 MG: 5 TABLET ORAL at 09:08

## 2018-06-13 NOTE — PROGRESS NOTES
Cardiology Progress Note                             [address] [facility]. [address], [place], [address] [facility]                                 Phone [phone]; Fax [phone]        2018 9:23 AM     Admit Date:           2018  Admit Diagnosis:  Chest pain  Chest pain  :          1955   MRN:          [id number]   ASSESSMENT/RECOMMENDATION:   1)Chronic recurrent anginal chest pain, exertional and non exertional.  - CAD with 1v disease/RCA  -cont ASA/statin/BB  - C cath unable to be done d/t respiratory status. -on high dose of imdur       2) Chronic HFpEF, class III, s/p CardioMEMS for PA monitoring:   - RHC yesterday showing Moderate-severe PA HTN, post capillary. Marked elevation in biV filling pressures. Cardiomem re- calibrated   - defer diuretics to nephrology, also now on HD      3) CKD  - creatinine trending down   -Defer diuretics to nephrology  - HD yesterday. -  AV fistula functioning       4) Chronic severe anemia, refractory to Procrit:   - hematology following   S/p BMB- normal   H/H stable      5) Chronic anticoagulation for DVT   - s/p Vit K and FFP (prior to BM biopsy)   - coumadin restarted. INr therapeutic. D/w Dr Manuelito Hernandez, will hold coumadin for upcoming yvette cath placement. Can start sub Q heparin for DVT prophylaxis once INR is subtherapeutic      6) Hypertension: liable, Hydralazine 100 mg to TID  - coreg/norvasc/imdur.   - low dose clonidine can titrate as needed    7) Hypo magensium: resolved     8) Chronic hypoxic resp failure: more SOB this AM will repeat chest Xray.    -Pulmonary s/o  -CT of chest this AM showing new groundglass areas of opacity and mild consolidation in the right upper lobe and lingula most consistent with superimposed pneumonia or alveolitis on chronic interstitial lung disease.   -prn nebs   -afebrile     9) Leukocytosis: WBC @15    Cardiology Attending:    Patient personally seen and examined. All the elements of history and examination were personally performed. Assessment and plan was discussed and agree as written above. Yuan Patel MD, [facility]        Future Appointments  Date Time Provider Meagan Mcintyre   6/19/2018 2:20 PM MD Maryuri Richardson Dr Recorded Weights in this Encounter    06/11/18 0400 06/12/18 0438 06/13/18 0038   Weight: 320 lb 12.3 oz (145.5 kg) 316 lb 8 oz (143.6 kg) 311 lb 1.6 oz (141.1 kg)         06/11 1901 - 06/13 0700  In: 1 [P.O.:830]  Out: [address] had HD yesterday, difficulty accessing fistula. Feels more SOB this AM, couldn't sleep had to sit up.          Current Facility-Administered Medications   Medication Dose Route Frequency    lidocaine-prilocaine (EMLA) 2.5-2.5 % cream   Topical PRN    cloNIDine HCl (CATAPRES) tablet 0.1 mg  0.1 mg Oral BID    predniSONE (DELTASONE) tablet 20 mg  20 mg Oral DAILY WITH BREAKFAST    insulin NPH (NOVOLIN N, HUMULIN N) injection 15 Units  15 Units SubCUTAneous Q12H    prochlorperazine (COMPAZINE) injection 5 mg  5 mg IntraVENous Q8H PRN    albuterol-ipratropium (DUO-NEB) 2.5 MG-0.5 MG/3 ML  3 mL Nebulization Q8H RT    labetalol (NORMODYNE;TRANDATE) injection 20 mg  20 mg IntraVENous Q4H PRN    hydrALAZINE (APRESOLINE) tablet 100 mg  100 mg Oral TID    doxycycline (VIBRA-TABS) tablet 100 mg  100 mg Oral Q12H    sodium chloride (OCEAN) 0.65 % nasal squeeze bottle 2 Spray  2 Spray Both Nostrils Q2H PRN    HYDROmorphone (DILAUDID) syringe 0.5 mg  0.5 mg IntraVENous Q4H PRN    metOLazone (ZAROXOLYN) tablet 5 mg  5 mg Oral DAILY    epoetin charlie (EPOGEN;PROCRIT) injection 10,000 Units  10,000 Units SubCUTAneous Once per day on Thu Sat    ondansetron (ZOFRAN ODT) tablet 8 mg  8 mg Oral Q8H PRN    magnesium oxide (MAG-OX) tablet 400 mg  400 mg Oral BID    sodium chloride (NS) flush 5-10 mL  5-10 mL IntraVENous Q8H    sodium chloride (NS) flush 5-10 mL  5-10 mL IntraVENous PRN    bumetanide (BUMEX) injection 2 mg  2 mg IntraVENous BID    0.9% sodium chloride infusion 250 mL  250 mL IntraVENous PRN    nitroglycerin (NITROSTAT) tablet 0.4 mg  0.4 mg SubLINGual PRN    oxyCODONE-acetaminophen (PERCOCET 7.5) 7.5-325 mg per tablet 1 Tab  1 Tab Oral Q6H PRN    Warfarin: pharmacy to dose    Other Rx Dosing/Monitoring    amLODIPine (NORVASC) tablet 10 mg  10 mg Oral DAILY    albuterol (PROVENTIL VENTOLIN) nebulizer solution 2.5 mg  2.5 mg Nebulization Q6H PRN    allopurinol (ZYLOPRIM) tablet 100 mg  100 mg Oral DAILY    aspirin delayed-release tablet 81 mg  81 mg Oral DAILY    atorvastatin (LIPITOR) tablet 80 mg  80 mg Oral QHS    calcitRIOL (ROCALTROL) capsule 0.25 mcg  0.25 mcg Oral Q M, W, F & SAT    carvedilol (COREG) tablet 25 mg  25 mg Oral BID WITH MEALS    ergocalciferol (ERGOCALCIFEROL) capsule 50,000 Units  50,000 Units Oral every Tuesday    fluticasone (FLONASE) 50 mcg/actuation nasal spray 2 Spray  2 Spray Both Nostrils DAILY PRN    hydrOXYzine HCl (ATARAX) tablet 25 mg  25 mg Oral TID PRN    isosorbide mononitrate ER (IMDUR) tablet 120 mg  120 mg Oral DAILY    pantoprazole (PROTONIX) tablet 40 mg  40 mg Oral ACB    polyethylene glycol (MIRALAX) packet 17 g  17 g Oral DAILY    senna-docusate (PERICOLACE) 8.6-50 mg per tablet 1 Tab  1 Tab Oral DAILY    sodium chloride (NS) flush 5-10 mL  5-10 mL IntraVENous Q8H    sodium chloride (NS) flush 5-10 mL  5-10 mL IntraVENous PRN    naloxone (NARCAN) injection 0.4 mg  0.4 mg IntraVENous PRN    glucose chewable tablet 16 g  4 Tab Oral PRN    dextrose (D50W) injection syrg 12.5-25 g  12.5-25 g IntraVENous PRN    glucagon (GLUCAGEN) injection 1 mg  1 mg IntraMUSCular PRN    acetaminophen (TYLENOL) tablet 650 mg  650 mg Oral Q4H PRN    diphenhydrAMINE (BENADRYL) capsule 25 mg  25 mg Oral Q4H PRN    insulin lispro (HUMALOG) injection   SubCUTAneous AC&HS      OBJECTIVE               Intake/Output Summary (Last 24 hours) at 06/13/18 0839  Last data filed at 06/13/18 0756   Gross per 24 hour   Intake              490 ml   Output             3000 ml   Net            -2510 ml       Review of Systems - History obtained from the patient AS PER  HPI    Telemetry NSR    PHYSICAL EXAM        Visit Vitals    /80 (BP 1 Location: Left arm, BP Patient Position: At rest;Sitting)    Pulse 71    Temp 98.5 °F (36.9 °C)    Resp 19    Ht 5' 10\" (1.778 m)    Wt 311 lb 1.6 oz (141.1 kg)    SpO2 100%    BMI 44.64 kg/m2       Gen: Well-developed, obese, in no acute distress  alert and oriented x 3  HEENT:  Pink conjunctivae, Hearing grossly normal.No scleral icterus or conjunctival, moist mucous membranes  Neck: Supple, JVD difficult to appreciate  Resp: No accessory muscle use, diminished scattered crackles  Card: Regular Rate,Rythm, No murmurs, rubs or gallop. No thrills. GI:          soft, non-tender   MSK: No cyanosis or clubbing, good capillary refill  Skin: No rashes or ulcers, no bruising. Neuro:  Cranial nerves are grossly intact, moving all four extremities, no focal deficit, follows commands appropriately  Psych:  Good insight, oriented to person, place and time, alert, Nml Affect  LE: +1  edema. AV fistula LUE       DATA REVIEW            Laboratory and Imaging have been reviewed by me and are notable for  No results for input(s): CPK, CKMB, TROIQ in the last 72 hours.   Recent Labs      06/13/18   0025  06/12/18   0440  06/11/18   0407   NA  135*  134*  135*   K  4.3  4.6  4.7   CO2  30  25  25   BUN  67*  93*  102*   CREA  3.60*  4.21*  4.57*   GLU  167*  169*  192*   PHOS  3.7  4.9*  4.9*   WBC  15.5*  15.6*   --    HGB  9.2*  9.5*   --    HCT  29.1*  30.2*   --    PLT  252  255   --              Jeannie Barbosa NP

## 2018-06-13 NOTE — PROGRESS NOTES
Physical Therapy Note:    Chart reviewed, pt cleared for PT, PT treatment initiated and aborted. Pt reporting lightheadedness at rest. BP assessed and stable. Pt agreeable to PT today and requesting to ambulate using rollator walker. Upon initiation of mobility staff arrives for line placement, thus treatment aborted. Will continue to follow per POC.      Cecy Eastman, PT, DPT, CEEAA  13 mins

## 2018-06-13 NOTE — PROGRESS NOTES
930 Pt refused clonidine. Gives her light head. Md Rain Guerrero was notified. ANA [place] [address] Josiah Dickinson said to put a note in that [place] NP said they want INR to decrease from 2.2 before the new line is inserted. Stop Coumadin and have the new line done tomorrow or Friday. 0 HD  RN gathered  supplies for HD. HD RN checked the result for new port insertion to be used. 46 Pt in HD.    [address] for Epogen. 1716 Pt's afternoon BP med was held. Bedside and Verbal shift change report given to [address] [place] [address] (oncoming nurse) by Vaishnavi Aguilar RN (offgoing nurse). Report included the following information SBAR, Kardex, Procedure Summary, Intake/Output, MAR and Recent Results.

## 2018-06-13 NOTE — PROGRESS NOTES
Ezio Craig smooth [address]  [address] [facility], [place], [address] [place] [address]  [phone]      Medical Progress Note      NAME: aMrcial Dawson   :  1955  MRM:  [id number]    Date/Time: 2018  7:26 AM       Assessment and Plan:   1. Chronic respiratory failure with hypoxia / COPD, severe / ILD (interstitial lung disease) / Pulmonary HTN - continue diuretics. Palliative care consulted. Continue Oxygen as needed. CT scan of the chest from  noted. Pulmonary signed off. Wean steroid.       2. Parainfluenza virus - Tested positive on resp screen.  Supportive care.        3. Chronic diastolic heart failure - Continue bumex at higher dose and metolazone, strict I's and O's, daily weights, low salt diet. Cardiology s/o       4. DM (diabetes mellitus), type 2 with renal, vascular and neurological complications - Diabetic diet and counseling.  SSI per protocol. Started on NPH.     5. CKD (chronic kidney disease) stage 4 - Nephrology following. Started HD on .       6. CAD (coronary Artery Disease) Native Artery / HTN (hypertension) / Chest pain / Dyspnea - Pain chronic, unlikely AMI. Cath failed. Continue ASA, statin, BB, Imdur, as medical management.  No further plans for cath or workup.      7. Gastroparesis / Esophageal reflux - PPI.       8. Morbid obesity / JASEN on CPAP - continue CPAP, advise weight loss       9. Normocytic anemia - POA, heme/onc consulted. Mariana More just chronic disease. s/p BMB, pathology negative. EPO per renal.      10. Hx of deep venous thrombosis / Warfarin anticoagulation - continue  Coumadin per pharmacy       11. Gout - stable on allopurinol                Subjective:     Chief Complaint:  Follow up of pt who was admitted with respiratory failure. C/o SOB     ROS:  (bold if positive, if negative)      Tolerating PT  Tolerating Diet        Objective:     Last 24hrs VS reviewed since prior progress note.  Most recent are:    Visit Vitals    /82  Pulse 70    Temp 98.4 °F (36.9 °C)    Resp 18    Ht 5' 10\" (1.778 m)    Wt 141.1 kg (311 lb 1.6 oz)    SpO2 97%    BMI 44.64 kg/m2     SpO2 Readings from Last 6 Encounters:   06/13/18 97%   05/15/18 91%   04/03/18 96%   03/20/18 98%   01/24/18 98%   01/10/18 95%    O2 Flow Rate (L/min): 2 l/min       Intake/Output Summary (Last 24 hours) at 06/13/18 0709  Last data filed at 06/12/18 1643   Gross per 24 hour   Intake              250 ml   Output             3000 ml   Net            -2750 ml        Physical Exam:    Gen:  obese, in no acute distress  HEENT:  Pink conjunctivae, PERRL, hearing intact to voice, moist mucous membranes  Neck:  Supple, without masses, thyroid non-tender  Resp:  No accessory muscle use, clear breath sounds without wheezes rales or rhonchi  Card:  No murmurs, normal S1, S2 without thrills, bruits or peripheral edema  Abd:  Soft, non-tender, non-distended, normoactive bowel sounds are present, no palpable organomegaly and no detectable hernias  Lymph:  No cervical or inguinal adenopathy  Musc:  No cyanosis or clubbing  Skin:  No rashes or ulcers, skin turgor is good  Neuro:  Cranial nerves are grossly intact, no focal motor weakness, follows commands appropriately  Psych:  Good insight, oriented to person, place and time, alert  __________________________________________________________________  Medications Reviewed: (see below)  Medications:     Current Facility-Administered Medications   Medication Dose Route Frequency    lidocaine-prilocaine (EMLA) 2.5-2.5 % cream   Topical PRN    cloNIDine HCl (CATAPRES) tablet 0.1 mg  0.1 mg Oral BID    predniSONE (DELTASONE) tablet 20 mg  20 mg Oral DAILY WITH BREAKFAST    insulin NPH (NOVOLIN N, HUMULIN N) injection 15 Units  15 Units SubCUTAneous Q12H    prochlorperazine (COMPAZINE) injection 5 mg  5 mg IntraVENous Q8H PRN    albuterol-ipratropium (DUO-NEB) 2.5 MG-0.5 MG/3 ML  3 mL Nebulization Q8H RT    labetalol (NORMODYNE;TRANDATE) injection 20 mg  20 mg IntraVENous Q4H PRN    hydrALAZINE (APRESOLINE) tablet 100 mg  100 mg Oral TID    doxycycline (VIBRA-TABS) tablet 100 mg  100 mg Oral Q12H    sodium chloride (OCEAN) 0.65 % nasal squeeze bottle 2 Spray  2 Spray Both Nostrils Q2H PRN    HYDROmorphone (DILAUDID) syringe 0.5 mg  0.5 mg IntraVENous Q4H PRN    metOLazone (ZAROXOLYN) tablet 5 mg  5 mg Oral DAILY    epoetin charlie (EPOGEN;PROCRIT) injection 10,000 Units  10,000 Units SubCUTAneous Once per day on Thu Sat    ondansetron (ZOFRAN ODT) tablet 8 mg  8 mg Oral Q8H PRN    magnesium oxide (MAG-OX) tablet 400 mg  400 mg Oral BID    sodium chloride (NS) flush 5-10 mL  5-10 mL IntraVENous Q8H    sodium chloride (NS) flush 5-10 mL  5-10 mL IntraVENous PRN    bumetanide (BUMEX) injection 2 mg  2 mg IntraVENous BID    0.9% sodium chloride infusion 250 mL  250 mL IntraVENous PRN    nitroglycerin (NITROSTAT) tablet 0.4 mg  0.4 mg SubLINGual PRN    oxyCODONE-acetaminophen (PERCOCET 7.5) 7.5-325 mg per tablet 1 Tab  1 Tab Oral Q6H PRN    Warfarin: pharmacy to dose    Other Rx Dosing/Monitoring    amLODIPine (NORVASC) tablet 10 mg  10 mg Oral DAILY    albuterol (PROVENTIL VENTOLIN) nebulizer solution 2.5 mg  2.5 mg Nebulization Q6H PRN    allopurinol (ZYLOPRIM) tablet 100 mg  100 mg Oral DAILY    aspirin delayed-release tablet 81 mg  81 mg Oral DAILY    atorvastatin (LIPITOR) tablet 80 mg  80 mg Oral QHS    calcitRIOL (ROCALTROL) capsule 0.25 mcg  0.25 mcg Oral Q M, W, F & SAT    carvedilol (COREG) tablet 25 mg  25 mg Oral BID WITH MEALS    ergocalciferol (ERGOCALCIFEROL) capsule 50,000 Units  50,000 Units Oral every Tuesday    fluticasone (FLONASE) 50 mcg/actuation nasal spray 2 Spray  2 Spray Both Nostrils DAILY PRN    hydrOXYzine HCl (ATARAX) tablet 25 mg  25 mg Oral TID PRN    isosorbide mononitrate ER (IMDUR) tablet 120 mg  120 mg Oral DAILY    pantoprazole (PROTONIX) tablet 40 mg  40 mg Oral ACB    polyethylene glycol (MIRALAX) packet 17 g  17 g Oral DAILY    senna-docusate (PERICOLACE) 8.6-50 mg per tablet 1 Tab  1 Tab Oral DAILY    sodium chloride (NS) flush 5-10 mL  5-10 mL IntraVENous Q8H    sodium chloride (NS) flush 5-10 mL  5-10 mL IntraVENous PRN    naloxone (NARCAN) injection 0.4 mg  0.4 mg IntraVENous PRN    glucose chewable tablet 16 g  4 Tab Oral PRN    dextrose (D50W) injection syrg 12.5-25 g  12.5-25 g IntraVENous PRN    glucagon (GLUCAGEN) injection 1 mg  1 mg IntraMUSCular PRN    acetaminophen (TYLENOL) tablet 650 mg  650 mg Oral Q4H PRN    diphenhydrAMINE (BENADRYL) capsule 25 mg  25 mg Oral Q4H PRN    insulin lispro (HUMALOG) injection   SubCUTAneous AC&HS        Lab Data Reviewed: (see below)  Lab Review:     Recent Labs      06/13/18 0025 06/12/18 0440   WBC  15.5*  15.6*   HGB  9.2*  9.5*   HCT  29.1*  30.2*   PLT  252  255     Recent Labs      06/13/18 0025 06/12/18 0440 06/11/18   0407   NA  135*  134*  135*   K  4.3  4.6  4.7   CL  101  100  100   CO2  30  25  25   GLU  167*  169*  192*   BUN  67*  93*  102*   CREA  3.60*  4.21*  4.57*   CA  8.8  9.8  9.2   PHOS  3.7  4.9*  4.9*   ALB  3.3*  3.6  3.6   INR  2.2*  2.2*  2.5*     Lab Results   Component Value Date/Time    Glucose (POC) 143 (H) 06/13/2018 05:06 AM    Glucose (POC) 170 (H) 06/13/2018 01:05 AM    Glucose (POC) 204 (H) 06/12/2018 08:55 PM    Glucose (POC) 161 (H) 06/12/2018 04:43 PM    Glucose (POC) 243 (H) 06/12/2018 11:37 AM     No results for input(s): PH, PCO2, PO2, HCO3, FIO2 in the last 72 hours. Recent Labs      06/13/18 0025 06/12/18 0440  06/11/18   0407   INR  2.2*  2.2*  2.5*     All Micro Results     Procedure Component Value Units Date/Time    RAYSHAWN Valle, UR/CSF [[id number]] Collected:  06/08/18 1682    Order Status:  Completed Specimen:  Other from Miscellaneous sample Updated:  06/09/18 1236     Source URINE        Specimen Urine     Streptococcus pneumoniae Ag NEGATIVE         Fluid culture Not Indicated     Organism ID Not indicated. Please note Comment         (NOTE)  College of [employer]ologists standards require a culture to be  performed on CSF specimens submitted for bacterial antigen testing. (CAP [id number]) Urine specimens will not be cultured. Performed At: [address] [id number]  Kiesha Teague MD NW:[id number]         RESPIRATORY Thurlow Tiarra [[id number]]  (Abnormal) Collected:  06/08/18 0824    Order Status:  Completed Specimen:  Nasopharyngeal Updated:  06/08/18 1247     Adenovirus NOT DETECTED        Coronavirus 229E NOT DETECTED        Coronavirus HKU1 NOT DETECTED        Coronavirus CVNL63 NOT DETECTED        Coronavirus OC43 NOT DETECTED        Metapneumovirus NOT DETECTED        Rhinovirus and Enterovirus NOT DETECTED        Influenza A NOT DETECTED        Influenza A, subtype H1 NOT DETECTED        Influenza A, subtype H3 NOT DETECTED        INFLUENZA A H1N1 PCR NOT DETECTED        Influenza B NOT DETECTED        Parainfluenza 1 NOT DETECTED        Parainfluenza 2 NOT DETECTED        Parainfluenza 3 DETECTED (A)        Parainfluenza virus 4 NOT DETECTED        RSV by PCR NOT DETECTED        Bordetella pertussis - PCR NOT DETECTED        Chlamydophila pneumoniae DNA, QL, PCR NOT DETECTED        Mycoplasma pneumoniae DNA, QL, PCR NOT DETECTED       URINE CULTURE HOLD SAMPLE [[id number]] Collected:  06/08/18 0826    Order Status:  Completed Specimen:  Serum Updated:  06/08/18 0835     Urine culture hold         URINE ON HOLD IN MICROBIOLOGY DEPT FOR 3 DAYS. IF UNPRESERVED URINE IS SUBMITTED, IT CANNOT BE USED FOR ADDITIONAL TESTING AFTER 24 HRS, RECOLLECTION WILL BE REQUIRED. I have reviewed notes of prior 24hr. Other pertinent lab:       Total time spent with patient: [address] discussed with: Patient, Family, Nursing Staff and >50% of time spent in counseling and coordination of care    Discussed:  Care Plan    Prophylaxis: warfarin     Disposition:  Home w/Family           ___________________________________________________    Attending Physician: Gilford Saunders, MD

## 2018-06-13 NOTE — ROUTINE PROCESS
Received consult to radiology regarding pts need for tunneled dialysis catheter. INR noted to be 2.2 today and wbc count 15.5. Per Cathy, bedside RN, Kamini Soliz present from cardiology and states permacath will need to be on hold until INR result 1.5 or lower-possibly tomorrow or Friday. Coumadin and heparin doses to be held for now.

## 2018-06-13 NOTE — PROGRESS NOTES
[address] [facility] [place]  YOB: 1955          Assessment & Plan:   CKD 4  · ESRD now  · HD started 6 11 18  · Second HD yesterday: 2.5 hrs,  EMLA cream at avf and still with lots of pan, and hard stick, placed by DR. Lyle, not avaialble here, out pt eval  · 3rd HD today via temp line  · Pt refuses AVF Stick  · Get Permcath, hold warfarin if ok by cards: dw cards  · Get Temp line in am  · Hep panel  · QM to look for unit: she lives near [facility] failure  · Cont loops  · DC Metolazone  · UF with HD  CP  · On oxygen  · Cath 6 5 18     HTN   · 156/87 this am.Amlodipine,Corge,Loops,Hydralazine    SHPT   · Calcitriol    Anemia of CKD   ·   Epo with HD  · S/p BMB  : normal       Subjective:   CC: f/u CKD  HPI:   CKD:ne hd start  AVF : Difficult stick  She refuses avf stick  Hyponatremia and anemia are stable.   On Calcitriol for 2 PTH  ROS: no n/v/ neg for 10 sys except in HPI  Current Facility-Administered Medications   Medication Dose Route Frequency    lidocaine-prilocaine (EMLA) 2.5-2.5 % cream   Topical PRN    cloNIDine HCl (CATAPRES) tablet 0.1 mg  0.1 mg Oral BID    predniSONE (DELTASONE) tablet 20 mg  20 mg Oral DAILY WITH BREAKFAST    insulin NPH (NOVOLIN N, HUMULIN N) injection 15 Units  15 Units SubCUTAneous Q12H    prochlorperazine (COMPAZINE) injection 5 mg  5 mg IntraVENous Q8H PRN    albuterol-ipratropium (DUO-NEB) 2.5 MG-0.5 MG/3 ML  3 mL Nebulization Q8H RT    labetalol (NORMODYNE;TRANDATE) injection 20 mg  20 mg IntraVENous Q4H PRN    hydrALAZINE (APRESOLINE) tablet 100 mg  100 mg Oral TID    doxycycline (VIBRA-TABS) tablet 100 mg  100 mg Oral Q12H    sodium chloride (OCEAN) 0.65 % nasal squeeze bottle 2 Spray  2 Spray Both Nostrils Q2H PRN    HYDROmorphone (DILAUDID) syringe 0.5 mg  0.5 mg IntraVENous Q4H PRN    metOLazone (ZAROXOLYN) tablet 5 mg  5 mg Oral DAILY    epoetin charlie (EPOGEN;PROCRIT) injection 10,000 Units  10,000 Units SubCUTAneous Once per day on Thu Sat    ondansetron (ZOFRAN ODT) tablet 8 mg  8 mg Oral Q8H PRN    magnesium oxide (MAG-OX) tablet 400 mg  400 mg Oral BID    sodium chloride (NS) flush 5-10 mL  5-10 mL IntraVENous Q8H    sodium chloride (NS) flush 5-10 mL  5-10 mL IntraVENous PRN    bumetanide (BUMEX) injection 2 mg  2 mg IntraVENous BID    0.9% sodium chloride infusion 250 mL  250 mL IntraVENous PRN    nitroglycerin (NITROSTAT) tablet 0.4 mg  0.4 mg SubLINGual PRN    oxyCODONE-acetaminophen (PERCOCET 7.5) 7.5-325 mg per tablet 1 Tab  1 Tab Oral Q6H PRN    Warfarin: pharmacy to dose    Other Rx Dosing/Monitoring    amLODIPine (NORVASC) tablet 10 mg  10 mg Oral DAILY    albuterol (PROVENTIL VENTOLIN) nebulizer solution 2.5 mg  2.5 mg Nebulization Q6H PRN    allopurinol (ZYLOPRIM) tablet 100 mg  100 mg Oral DAILY    aspirin delayed-release tablet 81 mg  81 mg Oral DAILY    atorvastatin (LIPITOR) tablet 80 mg  80 mg Oral QHS    calcitRIOL (ROCALTROL) capsule 0.25 mcg  0.25 mcg Oral Q M, W, F & SAT    carvedilol (COREG) tablet 25 mg  25 mg Oral BID WITH MEALS    ergocalciferol (ERGOCALCIFEROL) capsule 50,000 Units  50,000 Units Oral every Tuesday    fluticasone (FLONASE) 50 mcg/actuation nasal spray 2 Spray  2 Spray Both Nostrils DAILY PRN    hydrOXYzine HCl (ATARAX) tablet 25 mg  25 mg Oral TID PRN    isosorbide mononitrate ER (IMDUR) tablet 120 mg  120 mg Oral DAILY    pantoprazole (PROTONIX) tablet 40 mg  40 mg Oral ACB    polyethylene glycol (MIRALAX) packet 17 g  17 g Oral DAILY    senna-docusate (PERICOLACE) 8.6-50 mg per tablet 1 Tab  1 Tab Oral DAILY    sodium chloride (NS) flush 5-10 mL  5-10 mL IntraVENous Q8H    sodium chloride (NS) flush 5-10 mL  5-10 mL IntraVENous PRN    naloxone (NARCAN) injection 0.4 mg  0.4 mg IntraVENous PRN    glucose chewable tablet 16 g  4 Tab Oral PRN    dextrose (D50W) injection syrg 12.5-25 g  12.5-25 g IntraVENous PRN    glucagon (GLUCAGEN) injection 1 mg  1 mg IntraMUSCular PRN    acetaminophen (TYLENOL) tablet 650 mg  650 mg Oral Q4H PRN    diphenhydrAMINE (BENADRYL) capsule 25 mg  25 mg Oral Q4H PRN    insulin lispro (HUMALOG) injection   SubCUTAneous AC&HS          Objective:     Vitals:  Blood pressure 148/80, pulse 71, temperature 98.5 °F (36.9 °C), resp. rate 19, height 5' 10\" (1.778 m), weight 141.1 kg (311 lb 1.6 oz), SpO2 100 %. Temp (24hrs), Av.4 °F (36.9 °C), Min:98 °F (36.7 °C), Max:98.8 °F (37.1 °C)      Intake and Output:      1901 -  0700  In: 1 [P.O.:830]  Out: 4700 [Urine:500]    Physical Exam:               GENERAL ASSESSMENT: NAD  CHEST: CTA  HEART: S1S2  ABDOMEN: Soft,NT  SKIN DRY  JT: No acute findings  EXTREMITY: trace EDEMA          Data Review      No results for input(s): TNIPOC in the last 72 hours. No lab exists for component: ITNL   No results for input(s): CPK, CKMB, TROIQ in the last 72 hours. Recent Labs      18   NA  135*  134*  135*   K  4.3  4.6  4.7   CL  101  100  100   CO2  30  25  25   BUN  67*  93*  102*   CREA  3.60*  4.21*  4.57*   GLU  167*  169*  192*   PHOS  3.7  4.9*  4.9*   CA  8.8  9.8  9.2   ALB  3.3*  3.6  3.6   WBC  15.5*  15.6*   --    HGB  9.2*  9.5*   --    HCT  29.1*  30.2*   --    PLT  252  255   --       Recent Labs      18   INR  2.2*  2.2*  2.5*   PTP  22.1*  21.8*  24.8*     Needs: urine analysis, urine sodium, protein and creatinine  Lab Results   Component Value Date/Time    Sodium urine, random 25 11/10/2014 12:40 PM    Creatinine, urine 145.29 2017 05:01 AM         : Annalisa Franco MD  2018        [facility]:  [email or web address]. [employer]  Sakshi Mercado office:  [address] [place] [address],[address] [place] [address]  Phone: [phone]  Fax :     [phone]    [place] office:  [address] [address] [facility], [address]   Phone - [phone]  Fax - [phone]

## 2018-06-13 NOTE — PROGRESS NOTES
Pt's preference for OP HD is [facility]. I have notified Valarie Anthony [address] in [facility].  Thanks CHANDA Dozier

## 2018-06-13 NOTE — PROGRESS NOTES
Nutrition Assessment:    RECOMMENDATIONS/INTERVENTION(S):       1. Recommend continuing diet as tolerated- Cardiac 2 gm/Consistent Carbohydrate 2200 kcal  2. Encourage protein intakes - continue Ensure High Protein w meals  Monitor PO intakes, weight, BG, renal labs      ASSESSMENT:   6/13: Follow up. Pt on CCD/Cardiac diet again. Eating 25-50%. Drinking ONS. Monitor K/Phos and need to change to Nepro if elevated. Pt started HD 6/11. Day 3 today. On HD currently. Phos was elevated, currently WNL. Last BM 6/10. Weight loss of 17 lbs (5%) over last 5 days, likely fluid related to extraction on HD. Edema now 1+ BLE. Poor intakes x 5 days. Pt light headed and nauseated off and on.     6/8: Patient seen for f/u, visited pt and . Pt c/o's very poor appetite. Nausea at times, diet downgraded to clear liquids. Spoke with Dr. Gonzalez Choudhury and nurse, will try Ensure High Protein for additional nutrition. Pt tolerated Ensure well, RD to order Q meal until tolerating solids well, adjust ONS as needed. 6/5: 63 yr old female admitted for chest pain. Pt screened for LOS. Pt currently NPO- had cardiac cath placed earlier today. Advance diet as able to Cardiac 2 gm/CCD 2200 kcal. Pt with history of DM, A1c 5.2, Hgb 8.4, so A1c likely inaccurate.  ,122, 121 mg/dL, , 125, 198, 202 mg/dL. Weight relatively stable. Usual weight between 330-345 lbs. Currently 334 lbs. When pt was on a diet, she ate 100% of tray. Appetite normal. Pt hungry after being NPO all day for cardiac cath. No history of chew/swallow difficulties. No n/v currently. No food allergies. Last BM 5/29. On miralax and colace. Pt may require additional bowel regimen.  BLE 2+, BUE 1+    SUBJECTIVE/OBJECTIVE:   Diet Order: Clear, Funk, Advance as tolerated  % Eaten:    Patient Vitals for the past 168 hrs:   % Diet Eaten   06/11/18 1547 25 %   06/11/18 1043 25 %   06/10/18 1524 50 %   06/09/18 0748 25 %        Pertinent Medications: [x] Reviewed: Coumadin, Pericolace, Prednisone, Miralax, Protonix, Mag-OX, SSI, Dilaudid, Epogen, Doxycyline,  Bumex    Past Medical History:   Diagnosis Date     Sleep Apnea 2/16/2011    Aortic aneurysm ([address]     CAD (coronary Artery Disease) Native Artery 2/16/2011    CKD (chronic kidney disease) stage 4, GFR 15-29 ml/min ([address] 2/10/2017    COPD (chronic obstructive pulmonary disease) (HCC)     Diabetic gastroparesis (HCC)     Diastolic heart failure ([address] 10/5/2012    DM type 2 causing neurological disease ([address]     DM type 2 causing renal disease ([address]     DM type 2 causing vascular disease ([address]     Esophageal stricture 2012    dialted Dr. Eduardo Herrera [id number] deficiency [facility])     trait    GERD (gastroesophageal reflux disease)     Gout     ILD (interstitial lung disease) ([address]     open lung bx CJW 2010    Morbid obesity ([address]     OA (osteoarthritis)     Ovarian cancer ([address]     cervical and uterine    Rheumatoid arteritis     S/P left pulmonary artery pressure sensor implant placement 8/31/2017    [employer]     Tobacco use disorder 3/21/2012    Uterine cervix cancer ([address]     Vitamin D deficiency 8/9/2013        Chemistries:  Lab Results   Component Value Date/Time    Sodium 135 (L) 06/13/2018 12:25 AM    Potassium 4.3 06/13/2018 12:25 AM    Chloride 101 06/13/2018 12:25 AM    CO2 30 06/13/2018 12:25 AM    Anion gap 4 (L) 06/13/2018 12:25 AM    Glucose 167 (H) 06/13/2018 12:25 AM    BUN 67 (H) 06/13/2018 12:25 AM    Creatinine 3.60 (H) 06/13/2018 12:25 AM    BUN/Creatinine ratio 19 06/13/2018 12:25 AM    GFR est AA 16 (L) 06/13/2018 12:25 AM    GFR est non-AA 13 (L) 06/13/2018 12:25 AM    Calcium 8.8 06/13/2018 12:25 AM    AST (SGOT) 24 11/21/2017 12:40 PM    Alk.  phosphatase 94 11/21/2017 12:40 PM    Protein, total 6.8 11/21/2017 12:40 PM    Albumin 3.3 (L) 06/13/2018 12:25 AM    Globulin 3.6 11/21/2017 12:40 PM    A-G Ratio 0.9 (L) 11/21/2017 12:40 PM    ALT (SGPT) 19 11/21/2017 12:40 PM Anthropometrics: Height: 5' 10\" (177.8 cm) Weight: 141.1 kg (311 lb 1.6 oz)    IBW (%IBW): 68 kg (150 lb) (218.99 %) UBW (%UBW): 149.7 kg (330 lb) (to 345 lbs) (99.54 %)    BMI: Body mass index is 44.64 kg/(m^2). This BMI is indicative of:     [] Underweight    [] Normal    [] Overweight    []  Obesity    [x]  Extreme Obesity (BMI>40)    Estimated Nutrition Needs (Based on): 6791 Kcals/day (UKD(2253F4. 3)-500) , 121 g (-152g/day(0.8-1.0g/kg)) Protein  Carbohydrate: At Least 130 g/day  Fluids: 2300 mL/day (1mL/kg rounded to 50 mL)    Last BM: 6/10  [x]Active     []Hyperactive  []Hypoactive       [] Absent   BS: not charted  Skin:    [] Intact   [x] Incision - cardiac cath placed  [] Breakdown   [] DTI   [] Tears/Excoriation/Abrasion  [x]Edema: LUE, LLE, RUE, RLE [] Other:    Wt Readings from Last 30 Encounters:   06/13/18 141.1 kg (311 lb 1.6 oz)   05/15/18 152.1 kg (335 lb 6.4 oz)   04/03/18 153 kg (337 lb 3.2 oz)   03/20/18 153.8 kg (339 lb)   02/22/18 153.8 kg (339 lb)   01/24/18 154.7 kg (341 lb)   01/10/18 156.5 kg (345 lb)   12/22/17 153.8 kg (339 lb)   12/19/17 152.9 kg (337 lb)   12/06/17 156 kg (344 lb)   11/29/17 155.1 kg (342 lb)   11/21/17 154.2 kg (340 lb)   11/20/17 156.9 kg (346 lb)   11/14/17 156.9 kg (346 lb)   10/25/17 151.5 kg (334 lb)   10/10/17 149.7 kg (330 lb)   09/15/17 145.6 kg (321 lb)   09/12/17 149.2 kg (329 lb)   09/05/17 152 kg (335 lb)   08/31/17 151.1 kg (333 lb 1.8 oz)   08/18/17 152.4 kg (336 lb)   08/15/17 153.8 kg (339 lb)   08/03/17 155.1 kg (342 lb)   07/11/17 151.2 kg (333 lb 6.4 oz)   06/07/17 155.6 kg (343 lb)   05/24/17 151.9 kg (334 lb 12.8 oz)   05/17/17 151.5 kg (334 lb)   05/10/17 152.6 kg (336 lb 6.4 oz)   04/17/17 145.1 kg (319 lb 12.8 oz)   03/29/17 144.7 kg (319 lb)      NUTRITION DIAGNOSES:   Problem:  Inadequate energy intake     Etiology: related to decreased ability to consume sufficient energy     Signs/Symptoms: as evidenced by NPO status, large stature 6/8: Now with nausea, poor appetite, on clear liquids ( advance as tolerated, OK by MD to add Ensure High Protein)  6/13: CCD/Cardiac diet- appetite still poor, ONS okay.    NUTRITION INTERVENTIONS:  Meals/Snacks: General/healthful diet   Supplements: Commercial supplement              GOAL:   Pt will tolerate Ensure HIgh Protein, with diet advancement to solids within 3-5 days    Cultural, Scientology, or Ethnic Dietary Needs: None     LEARNING NEEDS (Diet, Food/Nutrient-Drug Interaction):    [x] None Identified   [] Identified and Education Provided/Documented   [] Identified and Pt declined/was not appropriate      [x] Interdisciplinary Care Plan Reviewed/Documented    [x] Discharge Needs:    TBD   [] No Nutrition Related Discharge Needs    NUTRITION RISK:   Pt Is At Nutrition Risk  [x]     No Nutrition Risk Identified  []       PT SEEN FOR:    []  MD Consult: []Calorie Count      []Diabetic Diet Education        []Diet Education     []Electrolyte Management     []General Nutrition Management and Supplements     []Management of Tube Feeding     []TPN Recommendations    []  RN Referral:  []MST score >=2     []Enteral/Parenteral Nutrition PTA     []Pregnant: Gestational DM or Multigestation                 [] Pressure Ulcer      []  Low BMI      []  Length of Stay       [] Dysphagia Diet     [] Ventilator      [x] Follow-Up        Previous Recommendations:   [x] Implemented          [] Not Implemented          [] Not Applicable    Previous Goal:   [] Met              [x] Progressing Towards Goal              [] Not Progressing Towards Goal   [] Not Applicable              Janett Madison, [address] [place] [address]  Pager: [phone]  Office: [phone]

## 2018-06-13 NOTE — PROGRESS NOTES
Problem: Falls - Risk of  Goal: *Absence of Falls  Document Sarah Fall Risk and appropriate interventions in the flowsheet.    Outcome: Progressing Towards Goal  Fall Risk Interventions:  Mobility Interventions: Assess mobility with egress test, Bed/chair exit alarm, Patient to call before getting OOB, PT Consult for mobility concerns         Medication Interventions: Assess postural VS orthostatic hypotension, Bed/chair exit alarm, Patient to call before getting OOB, Teach patient to arise slowly    Elimination Interventions: Bed/chair exit alarm, Call light in reach, Toilet paper/wipes in reach, Toileting schedule/hourly rounds    History of Falls Interventions: Bed/chair exit alarm, Consult care management for discharge planning, Evaluate medications/consider consulting pharmacy, Room close to nurse's station

## 2018-06-13 NOTE — DIALYSIS
[facility]  [phone]    Vitals   Pre   Post   Assessment   Pre   Post     Temp  Temp: 98.5 °F (36.9 °C) (06/13/18 1254)  99.1 LOC  A & O x 3 A & O x 3   HR   Pulse (Heart Rate): 74 (06/13/18 1515) 80 Lungs   Productive cough lungs diminished in bases O2 2L nc  cough, lungs diminished   B/P   BP: 139/69 (06/13/18 1515) 146/73 Cardiac   Cardiac Monitor Cardiac monitor   Resp   Resp Rate: 20 (06/13/18 1446) 20 Skin   Clean and dry  clean and dry   Pain level  Pain Intensity 1: 0 (06/13/18 0756) 0 Edema  trace     trace   Orders:    Duration:   Start:   14:46 End:   18:16     Total:   3:30   Dialyzer:   Dialyzer/Set Up Inspection: Thor Jeff (06/13/18 1254)   K Bath:   Dialysate K (mEq/L): 3 (06/13/18 1254)   Ca Bath:   Dialysate CA (mEq/L): 2.5 (06/13/18 1254)   Na/Bicarb:   Dialysate NA (mEq/L): 140 (06/13/18 1254)   Target Fluid Removal:   Goal/Amount of Fluid to Remove (mL): 3000 mL (06/13/18 1254)   Access     Type & Location:    Right IJ NON TUNNELED CVC aspirated, assessed, accessed  and prepped x 2 lumens, all lines visible during treatment.    Labs     Obtained/Reviewed      Date when labs were drawn-  Hgb-    HGB   Date Value Ref Range Status   06/13/2018 9.2 (L) 11.5 - 16.0 g/dL Final     K-    Potassium   Date Value Ref Range Status   06/13/2018 4.3 3.5 - 5.1 mmol/L Final     Ca-   Calcium   Date Value Ref Range Status   06/13/2018 8.8 8.5 - 10.1 MG/DL Final     Bun-   BUN   Date Value Ref Range Status   06/13/2018 67 (H) 6 - 20 MG/DL Final     Creat-   Creatinine   Date Value Ref Range Status   06/13/2018 3.60 (H) 0.55 - 1.02 MG/DL Final        Medications/ Blood Products Given     Name   Dose   Route and Time     none                Blood Volume Processed (BVP):    67.4 Net Fluid   Removed:  3000 ML   Comments   Time Out Done: Yes  Primary Nurse Rpt Pre: Arik Lucero RN  Primary Nurse Rpt Post: Arik Lucero RN  Pt Education: CVC care  Care Plan: Hemodialysis as scheduled    Tx Summary: Spoke with Dr. Papo Craven, treatment time 3:30 . Patient tolerated treatment well. All possible blood rinsed back to patient. Both lumens flushed with normal saline caps placed and clamps secured x 2. Report to primary nurse. Bed lowered and call bell within reach. Admiting Diagnosis: Chest pain and SOB, Respiratory failure, CAD,CKD STAGE 4, HTN, CHF, DM. L WRIST AVF +T/+B with dry dressing pt reports it is painful to touch from previous sticks, won't allow use hence placement of temporary catheter placement confirmed. Pt's previous clinic-none  Consent signed - Obtained 6/13/18  Mauro Consent - Obtained 6/13/18  Hepatitis Status- negative 6/12/18  Machine #- Machine Number: [id number] (06/13/18 [id number])  Telemetry status- Cardiac monitor  Pre-dialysis wt. - SANJIV

## 2018-06-14 LAB
ALBUMIN SERPL-MCNC: 3.2 G/DL (ref 3.5–5)
ANION GAP SERPL CALC-SCNC: 9 MMOL/L (ref 5–15)
BUN SERPL-MCNC: 42 MG/DL (ref 6–20)
BUN/CREAT SERPL: 13 (ref 12–20)
CALCIUM SERPL-MCNC: 8.8 MG/DL (ref 8.5–10.1)
CHLORIDE SERPL-SCNC: 100 MMOL/L (ref 97–108)
CO2 SERPL-SCNC: 30 MMOL/L (ref 21–32)
CREAT SERPL-MCNC: 3.23 MG/DL (ref 0.55–1.02)
GLUCOSE BLD STRIP.AUTO-MCNC: 203 MG/DL (ref 65–100)
GLUCOSE BLD STRIP.AUTO-MCNC: 287 MG/DL (ref 65–100)
GLUCOSE BLD STRIP.AUTO-MCNC: 301 MG/DL (ref 65–100)
GLUCOSE BLD STRIP.AUTO-MCNC: 95 MG/DL (ref 65–100)
GLUCOSE SERPL-MCNC: 84 MG/DL (ref 65–100)
INR PPP: 2.1 (ref 0.9–1.1)
PHOSPHATE SERPL-MCNC: 3.4 MG/DL (ref 2.6–4.7)
POTASSIUM SERPL-SCNC: 4 MMOL/L (ref 3.5–5.1)
PROTHROMBIN TIME: 21.4 SEC (ref 9–11.1)
SERVICE CMNT-IMP: ABNORMAL
SERVICE CMNT-IMP: NORMAL
SODIUM SERPL-SCNC: 139 MMOL/L (ref 136–145)

## 2018-06-14 PROCEDURE — 36415 COLL VENOUS BLD VENIPUNCTURE: CPT | Performed by: NURSE PRACTITIONER

## 2018-06-14 PROCEDURE — 74011250637 HC RX REV CODE- 250/637: Performed by: INTERNAL MEDICINE

## 2018-06-14 PROCEDURE — 77010033678 HC OXYGEN DAILY

## 2018-06-14 PROCEDURE — 97530 THERAPEUTIC ACTIVITIES: CPT

## 2018-06-14 PROCEDURE — 74011000250 HC RX REV CODE- 250: Performed by: INTERNAL MEDICINE

## 2018-06-14 PROCEDURE — 74011250636 HC RX REV CODE- 250/636: Performed by: INTERNAL MEDICINE

## 2018-06-14 PROCEDURE — 74011636637 HC RX REV CODE- 636/637: Performed by: INTERNAL MEDICINE

## 2018-06-14 PROCEDURE — 74011000250 HC RX REV CODE- 250: Performed by: NURSE PRACTITIONER

## 2018-06-14 PROCEDURE — 85610 PROTHROMBIN TIME: CPT | Performed by: NURSE PRACTITIONER

## 2018-06-14 PROCEDURE — 94640 AIRWAY INHALATION TREATMENT: CPT

## 2018-06-14 PROCEDURE — 74011250637 HC RX REV CODE- 250/637: Performed by: NURSE PRACTITIONER

## 2018-06-14 PROCEDURE — 80069 RENAL FUNCTION PANEL: CPT | Performed by: INTERNAL MEDICINE

## 2018-06-14 PROCEDURE — 94660 CPAP INITIATION&MGMT: CPT

## 2018-06-14 PROCEDURE — 65660000000 HC RM CCU STEPDOWN

## 2018-06-14 PROCEDURE — 97116 GAIT TRAINING THERAPY: CPT

## 2018-06-14 PROCEDURE — 74011636637 HC RX REV CODE- 636/637: Performed by: PHYSICIAN ASSISTANT

## 2018-06-14 PROCEDURE — 82962 GLUCOSE BLOOD TEST: CPT

## 2018-06-14 RX ORDER — BUMETANIDE 1 MG/1
2 TABLET ORAL DAILY
Status: DISCONTINUED | OUTPATIENT
Start: 2018-06-14 | End: 2018-06-14

## 2018-06-14 RX ORDER — BUMETANIDE 1 MG/1
2 TABLET ORAL 2 TIMES DAILY
Status: DISCONTINUED | OUTPATIENT
Start: 2018-06-14 | End: 2018-06-14

## 2018-06-14 RX ORDER — BUMETANIDE 1 MG/1
2 TABLET ORAL DAILY
Status: DISCONTINUED | OUTPATIENT
Start: 2018-06-14 | End: 2018-06-15 | Stop reason: HOSPADM

## 2018-06-14 RX ADMIN — STANDARDIZED SENNA CONCENTRATE AND DOCUSATE SODIUM 1 TABLET: 8.6; 5 TABLET, FILM COATED ORAL at 08:22

## 2018-06-14 RX ADMIN — ATORVASTATIN CALCIUM 80 MG: 20 TABLET, FILM COATED ORAL at 21:38

## 2018-06-14 RX ADMIN — AMLODIPINE BESYLATE 10 MG: 5 TABLET ORAL at 08:21

## 2018-06-14 RX ADMIN — IPRATROPIUM BROMIDE AND ALBUTEROL SULFATE 3 ML: .5; 3 SOLUTION RESPIRATORY (INHALATION) at 00:00

## 2018-06-14 RX ADMIN — ISOSORBIDE MONONITRATE 120 MG: 60 TABLET ORAL at 08:21

## 2018-06-14 RX ADMIN — BUMETANIDE 2 MG: 0.25 INJECTION INTRAMUSCULAR; INTRAVENOUS at 08:20

## 2018-06-14 RX ADMIN — PREDNISONE 20 MG: 20 TABLET ORAL at 08:23

## 2018-06-14 RX ADMIN — ALLOPURINOL 100 MG: 100 TABLET ORAL at 08:21

## 2018-06-14 RX ADMIN — Medication 10 ML: at 13:29

## 2018-06-14 RX ADMIN — HUMAN INSULIN 15 UNITS: 100 INJECTION, SUSPENSION SUBCUTANEOUS at 21:00

## 2018-06-14 RX ADMIN — ASPIRIN 81 MG: 81 TABLET, COATED ORAL at 08:22

## 2018-06-14 RX ADMIN — Medication 10 ML: at 21:40

## 2018-06-14 RX ADMIN — INSULIN LISPRO 4 UNITS: 100 INJECTION, SOLUTION INTRAVENOUS; SUBCUTANEOUS at 21:00

## 2018-06-14 RX ADMIN — PANTOPRAZOLE SODIUM 40 MG: 40 TABLET, DELAYED RELEASE ORAL at 06:45

## 2018-06-14 RX ADMIN — CARVEDILOL 25 MG: 12.5 TABLET, FILM COATED ORAL at 08:21

## 2018-06-14 RX ADMIN — HYDRALAZINE HYDROCHLORIDE 100 MG: 25 TABLET ORAL at 21:39

## 2018-06-14 RX ADMIN — CARVEDILOL 25 MG: 12.5 TABLET, FILM COATED ORAL at 17:58

## 2018-06-14 RX ADMIN — HYDRALAZINE HYDROCHLORIDE 100 MG: 25 TABLET ORAL at 17:58

## 2018-06-14 RX ADMIN — INSULIN LISPRO 4 UNITS: 100 INJECTION, SOLUTION INTRAVENOUS; SUBCUTANEOUS at 12:25

## 2018-06-14 RX ADMIN — POLYETHYLENE GLYCOL (3350) 17 G: 17 POWDER, FOR SOLUTION ORAL at 08:19

## 2018-06-14 RX ADMIN — ONDANSETRON 8 MG: 4 TABLET, ORALLY DISINTEGRATING ORAL at 20:55

## 2018-06-14 RX ADMIN — BUMETANIDE 2 MG: 1 TABLET ORAL at 12:25

## 2018-06-14 RX ADMIN — IPRATROPIUM BROMIDE AND ALBUTEROL SULFATE 3 ML: .5; 3 SOLUTION RESPIRATORY (INHALATION) at 23:07

## 2018-06-14 RX ADMIN — HYDROMORPHONE HYDROCHLORIDE 0.5 MG: 1 INJECTION, SOLUTION INTRAMUSCULAR; INTRAVENOUS; SUBCUTANEOUS at 20:30

## 2018-06-14 RX ADMIN — Medication 400 MG: at 17:59

## 2018-06-14 RX ADMIN — HYDROMORPHONE HYDROCHLORIDE 0.5 MG: 1 INJECTION, SOLUTION INTRAMUSCULAR; INTRAVENOUS; SUBCUTANEOUS at 13:28

## 2018-06-14 RX ADMIN — IPRATROPIUM BROMIDE AND ALBUTEROL SULFATE 3 ML: .5; 3 SOLUTION RESPIRATORY (INHALATION) at 15:37

## 2018-06-14 RX ADMIN — Medication 400 MG: at 08:21

## 2018-06-14 RX ADMIN — Medication 10 ML: at 06:45

## 2018-06-14 RX ADMIN — METOLAZONE 5 MG: 5 TABLET ORAL at 08:22

## 2018-06-14 RX ADMIN — INSULIN LISPRO 5 UNITS: 100 INJECTION, SOLUTION INTRAVENOUS; SUBCUTANEOUS at 18:00

## 2018-06-14 RX ADMIN — IPRATROPIUM BROMIDE AND ALBUTEROL SULFATE 3 ML: .5; 3 SOLUTION RESPIRATORY (INHALATION) at 07:08

## 2018-06-14 RX ADMIN — ONDANSETRON 8 MG: 4 TABLET, ORALLY DISINTEGRATING ORAL at 13:28

## 2018-06-14 RX ADMIN — HYDRALAZINE HYDROCHLORIDE 100 MG: 25 TABLET ORAL at 08:20

## 2018-06-14 NOTE — PROGRESS NOTES
Spiritual Care Partner Volunteer visited patient in [facility] on 6/14/18. Documented by:    Renald Cranker., M.S.   Spiritual Care Department  If needs arise please call Rylee-YARY ([phone])

## 2018-06-14 NOTE — PROGRESS NOTES
[facility]  [address] [facility], [address] [place] [address]  [phone]      Medical Progress Note      NAME: Everett Anderson   :  1955  MRM:  [id number]    Date/Time: 2018  7:26 AM       Assessment and Plan:   1. Chronic respiratory failure with hypoxia / COPD, severe / ILD (interstitial lung disease) / Pulmonary HTN - continue diuretics. Palliative care consulted. Continue Oxygen as needed. CT scan of the chest from  noted. Pulmonary signed off. Wean steroid.       2. Parainfluenza virus - Supportive care.        3. Chronic diastolic heart failure - Continue bumex at higher dose and metolazone, strict I's and O's, daily weights, low salt diet. Cardiology s/o       4. DM (diabetes mellitus), type 2 with renal, vascular and neurological complications - Diabetic diet and counseling.  SSI per protocol. Started on NPH.     5. CKD (chronic kidney disease) stage 4 - Nephrology following. Started HD on . Plan for permcath once IRN is acceptable.       6. CAD (coronary Artery Disease) Native Artery / HTN (hypertension) / Chest pain / Dyspnea - Pain chronic, unlikely AMI. Cath failed. Continue ASA, statin, BB, Imdur, as medical management.  No further plans for cath or workup.      7. Gastroparesis / Esophageal reflux - PPI.       8. Morbid obesity / JASEN on CPAP - continue CPAP, advise weight loss       9. Normocytic anemia - POA, heme/onc consulted. Crystal Mckeon just chronic disease. s/p BMB, pathology negative. EPO per renal.      10. Hx of deep venous thrombosis / Warfarin anticoagulation - Holding Coumadin for permcath placement.       11. Gout - stable on allopurinol                Subjective:     Chief Complaint:  Follow up of pt who was admitted with respiratory failure. SOB is getting better     ROS:  (bold if positive, if negative)      Tolerating PT  Tolerating Diet        Objective:     Last 24hrs VS reviewed since prior progress note.  Most recent are:    Visit Vitals    /77    Pulse 72    Temp 98.4 °F (36.9 °C)    Resp 16    Ht 5' 10\" (1.778 m)    Wt 138 kg (304 lb 3.8 oz)    SpO2 98%    BMI 43.65 kg/m2     SpO2 Readings from Last 6 Encounters:   06/14/18 98%   05/15/18 91%   04/03/18 96%   03/20/18 98%   01/24/18 98%   01/10/18 95%    O2 Flow Rate (L/min): 2 l/min       Intake/Output Summary (Last 24 hours) at 06/14/18 0733  Last data filed at 06/14/18 0368   Gross per 24 hour   Intake              500 ml   Output             3650 ml   Net            -3150 ml        Physical Exam:    Gen:  obese, in no acute distress  HEENT:  Pink conjunctivae, PERRL, hearing intact to voice, moist mucous membranes  Neck:  Supple, without masses, thyroid non-tender  Resp:  No accessory muscle use, clear breath sounds without wheezes rales or rhonchi  Card:  No murmurs, normal S1, S2 without thrills, bruits or peripheral edema  Abd:  Soft, non-tender, non-distended, normoactive bowel sounds are present, no palpable organomegaly and no detectable hernias  Lymph:  No cervical or inguinal adenopathy  Musc:  No cyanosis or clubbing  Skin:  No rashes or ulcers, skin turgor is good  Neuro:  Cranial nerves are grossly intact, no focal motor weakness, follows commands appropriately  Psych:  Good insight, oriented to person, place and time, alert  __________________________________________________________________  Medications Reviewed: (see below)  Medications:     Current Facility-Administered Medications   Medication Dose Route Frequency    epoetin charlie (EPOGEN;PROCRIT) injection 15,000 Units  15,000 Units IntraVENous DIALYSIS MON, WED & FRI    heparin (porcine) 1,000 unit/mL injection 5,000 Units  5,000 Units InterCATHeter Multiple    lidocaine-prilocaine (EMLA) 2.5-2.5 % cream   Topical PRN    cloNIDine HCl (CATAPRES) tablet 0.1 mg  0.1 mg Oral BID    predniSONE (DELTASONE) tablet 20 mg  20 mg Oral DAILY WITH BREAKFAST    insulin NPH (NOVOLIN N, HUMULIN N) injection 15 Units  15 Units SubCUTAneous Q12H    prochlorperazine (COMPAZINE) injection 5 mg  5 mg IntraVENous Q8H PRN    albuterol-ipratropium (DUO-NEB) 2.5 MG-0.5 MG/3 ML  3 mL Nebulization Q8H RT    labetalol (NORMODYNE;TRANDATE) injection 20 mg  20 mg IntraVENous Q4H PRN    hydrALAZINE (APRESOLINE) tablet 100 mg  100 mg Oral TID    sodium chloride (OCEAN) 0.65 % nasal squeeze bottle 2 Spray  2 Spray Both Nostrils Q2H PRN    HYDROmorphone (DILAUDID) syringe 0.5 mg  0.5 mg IntraVENous Q4H PRN    metOLazone (ZAROXOLYN) tablet 5 mg  5 mg Oral DAILY    ondansetron (ZOFRAN ODT) tablet 8 mg  8 mg Oral Q8H PRN    magnesium oxide (MAG-OX) tablet 400 mg  400 mg Oral BID    sodium chloride (NS) flush 5-10 mL  5-10 mL IntraVENous Q8H    sodium chloride (NS) flush 5-10 mL  5-10 mL IntraVENous PRN    bumetanide (BUMEX) injection 2 mg  2 mg IntraVENous BID    0.9% sodium chloride infusion 250 mL  250 mL IntraVENous PRN    nitroglycerin (NITROSTAT) tablet 0.4 mg  0.4 mg SubLINGual PRN    oxyCODONE-acetaminophen (PERCOCET 7.5) 7.5-325 mg per tablet 1 Tab  1 Tab Oral Q6H PRN    Warfarin: pharmacy to dose    Other Rx Dosing/Monitoring    amLODIPine (NORVASC) tablet 10 mg  10 mg Oral DAILY    albuterol (PROVENTIL VENTOLIN) nebulizer solution 2.5 mg  2.5 mg Nebulization Q6H PRN    allopurinol (ZYLOPRIM) tablet 100 mg  100 mg Oral DAILY    aspirin delayed-release tablet 81 mg  81 mg Oral DAILY    atorvastatin (LIPITOR) tablet 80 mg  80 mg Oral QHS    calcitRIOL (ROCALTROL) capsule 0.25 mcg  0.25 mcg Oral Q M, W, F & SAT    carvedilol (COREG) tablet 25 mg  25 mg Oral BID WITH MEALS    ergocalciferol (ERGOCALCIFEROL) capsule 50,000 Units  50,000 Units Oral every Tuesday    fluticasone (FLONASE) 50 mcg/actuation nasal spray 2 Spray  2 Spray Both Nostrils DAILY PRN    hydrOXYzine HCl (ATARAX) tablet 25 mg  25 mg Oral TID PRN    isosorbide mononitrate ER (IMDUR) tablet 120 mg  120 mg Oral DAILY    pantoprazole (PROTONIX) tablet 40 mg  40 mg Oral ACB    polyethylene glycol (MIRALAX) packet 17 g  17 g Oral DAILY    senna-docusate (PERICOLACE) 8.6-50 mg per tablet 1 Tab  1 Tab Oral DAILY    sodium chloride (NS) flush 5-10 mL  5-10 mL IntraVENous Q8H    sodium chloride (NS) flush 5-10 mL  5-10 mL IntraVENous PRN    naloxone (NARCAN) injection 0.4 mg  0.4 mg IntraVENous PRN    glucose chewable tablet 16 g  4 Tab Oral PRN    dextrose (D50W) injection syrg 12.5-25 g  12.5-25 g IntraVENous PRN    glucagon (GLUCAGEN) injection 1 mg  1 mg IntraMUSCular PRN    acetaminophen (TYLENOL) tablet 650 mg  650 mg Oral Q4H PRN    diphenhydrAMINE (BENADRYL) capsule 25 mg  25 mg Oral Q4H PRN    insulin lispro (HUMALOG) injection   SubCUTAneous AC&HS        Lab Data Reviewed: (see below)  Lab Review:     Recent Labs      06/13/18   0025  06/12/18   0440   WBC  15.5*  15.6*   HGB  9.2*  9.5*   HCT  29.1*  30.2*   PLT  252  255     Recent Labs      06/14/18   0405  06/13/18   0025  06/12/18   0440   NA  139  135*  134*   K  4.0  4.3  4.6   CL  100  101  100   CO2  30  30  25   GLU  84  167*  169*   BUN  42*  67*  93*   CREA  3.23*  3.60*  4.21*   CA  8.8  8.8  9.8   PHOS  3.4  3.7  4.9*   ALB  3.2*  3.3*  3.6   INR  2.1*  2.2*  2.2*     Lab Results   Component Value Date/Time    Glucose (POC) 95 06/14/2018 07:40 AM    Glucose (POC) 352 (H) 06/13/2018 09:18 PM    Glucose (POC) 136 (H) 06/13/2018 04:37 PM    Glucose (POC) 204 (H) 06/13/2018 12:06 PM    Glucose (POC) 159 (H) 06/13/2018 07:39 AM     No results for input(s): PH, PCO2, PO2, HCO3, FIO2 in the last 72 hours. Recent Labs      06/14/18   0405  06/13/18   0025  06/12/18   0440   INR  2.1*  2.2*  2.2*     All Micro Results     Procedure Component Value Units Date/Time    RAYSHAWN Mosher, UR/CSF [[id number]] Collected:  06/08/18 1397    Order Status:  Completed Specimen:  Other from Miscellaneous sample Updated:  06/09/18 1236     Source URINE        Specimen Urine     Streptococcus pneumoniae Ag NEGATIVE         Fluid culture Not Indicated     Organism ID Not indicated. Please note Comment         (NOTE)  College of [employer]ologists standards require a culture to be  performed on CSF specimens submitted for bacterial antigen testing. (CAP [id number]) Urine specimens will not be cultured. Performed At: [address] [id number]  Angeline Amador MD BW:[id number]         RESPIRATORY Leana Cooper [[id number]]  (Abnormal) Collected:  06/08/18 0824    Order Status:  Completed Specimen:  Nasopharyngeal Updated:  06/08/18 1247     Adenovirus NOT DETECTED        Coronavirus 229E NOT DETECTED        Coronavirus HKU1 NOT DETECTED        Coronavirus CVNL63 NOT DETECTED        Coronavirus OC43 NOT DETECTED        Metapneumovirus NOT DETECTED        Rhinovirus and Enterovirus NOT DETECTED        Influenza A NOT DETECTED        Influenza A, subtype H1 NOT DETECTED        Influenza A, subtype H3 NOT DETECTED        INFLUENZA A H1N1 PCR NOT DETECTED        Influenza B NOT DETECTED        Parainfluenza 1 NOT DETECTED        Parainfluenza 2 NOT DETECTED        Parainfluenza 3 DETECTED (A)        Parainfluenza virus 4 NOT DETECTED        RSV by PCR NOT DETECTED        Bordetella pertussis - PCR NOT DETECTED        Chlamydophila pneumoniae DNA, QL, PCR NOT DETECTED        Mycoplasma pneumoniae DNA, QL, PCR NOT DETECTED       URINE CULTURE HOLD SAMPLE [[id number]] Collected:  06/08/18 0826    Order Status:  Completed Specimen:  Serum Updated:  06/08/18 0835     Urine culture hold         URINE ON HOLD IN MICROBIOLOGY DEPT FOR 3 DAYS. IF UNPRESERVED URINE IS SUBMITTED, IT CANNOT BE USED FOR ADDITIONAL TESTING AFTER 24 HRS, RECOLLECTION WILL BE REQUIRED. I have reviewed notes of prior 24hr. Other pertinent lab:       Total time spent with patient: [address] discussed with: Patient, Family, Nursing Staff and >50% of time spent in counseling and coordination of care    Discussed:  Care Plan    Prophylaxis: warfarin     Disposition:  Home w/Family           ___________________________________________________    Attending Physician: Tahir Rangel MD

## 2018-06-14 NOTE — PROGRESS NOTES
Problem: Falls - Risk of  Goal: *Absence of Falls  Document Sarah Fall Risk and appropriate interventions in the flowsheet. Outcome: Progressing Towards Goal  Fall Risk Interventions:  Mobility Interventions: Assess mobility with egress test         1600- Bedside and Verbal shift change report given to [address] [place] [address]  (oncoming nurse) by Mary Beth Hernandez RN  (offgoing nurse). Report included the following information SBAR, Kardex and Recent Results. .. 2330- Bedside and Verbal shift change report given to [address] [place] [address]  (oncoming nurse) by Lars Baker RN  (offgoing nurse). Report included the following information SBAR, Kardex and Recent Results. ..

## 2018-06-14 NOTE — PROGRESS NOTES
[address] [facility] [place]  YOB: 1955          Assessment & Plan:   CKD 4  · ESRD now,MWF here  · HD started 6 11 18  · 3rd HD  6 13 18 via temp line  · Pt refuses AVF Stick  · Get Permcath, warfarin ON HOLD, ok by cards: dw cards again  · Hep panel  · QM to look for unit: she lives near [facility] failure  · Cont loops  · DC Metolazone  · UF with HD  CP  · On oxygen  · Cath 6 5 18     HTN   · 156/87 this am.Amlodipine,Corge,Loops,Hydralazine    SHPT   · Calcitriol    Anemia of CKD   ·   Epo with HD  · S/p BMB  : normal       Subjective:   CC: f/u CKD  HPI:   CKD:ne hd start  AVF : Difficult stick,got temp cath, got HD,Feels better  She refuses avf stick  Hyponatremia is better  Eager to go home  On Calcitriol for 2 PTH  ROS: no n/v/ neg for 10 sys except in HPI  Current Facility-Administered Medications   Medication Dose Route Frequency    epoetin charlie (EPOGEN;PROCRIT) injection 15,000 Units  15,000 Units IntraVENous DIALYSIS MON, WED & FRI    heparin (porcine) 1,000 unit/mL injection 5,000 Units  5,000 Units InterCATHeter Multiple    lidocaine-prilocaine (EMLA) 2.5-2.5 % cream   Topical PRN    cloNIDine HCl (CATAPRES) tablet 0.1 mg  0.1 mg Oral BID    predniSONE (DELTASONE) tablet 20 mg  20 mg Oral DAILY WITH BREAKFAST    insulin NPH (NOVOLIN N, HUMULIN N) injection 15 Units  15 Units SubCUTAneous Q12H    prochlorperazine (COMPAZINE) injection 5 mg  5 mg IntraVENous Q8H PRN    albuterol-ipratropium (DUO-NEB) 2.5 MG-0.5 MG/3 ML  3 mL Nebulization Q8H RT    labetalol (NORMODYNE;TRANDATE) injection 20 mg  20 mg IntraVENous Q4H PRN    hydrALAZINE (APRESOLINE) tablet 100 mg  100 mg Oral TID    sodium chloride (OCEAN) 0.65 % nasal squeeze bottle 2 Spray  2 Spray Both Nostrils Q2H PRN    HYDROmorphone (DILAUDID) syringe 0.5 mg  0.5 mg IntraVENous Q4H PRN    metOLazone (ZAROXOLYN) tablet 5 mg  5 mg Oral DAILY    ondansetron (ZOFRAN ODT) tablet 8 mg  8 mg Oral Q8H PRN    magnesium oxide (MAG-OX) tablet 400 mg  400 mg Oral BID    sodium chloride (NS) flush 5-10 mL  5-10 mL IntraVENous Q8H    sodium chloride (NS) flush 5-10 mL  5-10 mL IntraVENous PRN    bumetanide (BUMEX) injection 2 mg  2 mg IntraVENous BID    0.9% sodium chloride infusion 250 mL  250 mL IntraVENous PRN    nitroglycerin (NITROSTAT) tablet 0.4 mg  0.4 mg SubLINGual PRN    oxyCODONE-acetaminophen (PERCOCET 7.5) 7.5-325 mg per tablet 1 Tab  1 Tab Oral Q6H PRN    Warfarin: pharmacy to dose    Other Rx Dosing/Monitoring    amLODIPine (NORVASC) tablet 10 mg  10 mg Oral DAILY    albuterol (PROVENTIL VENTOLIN) nebulizer solution 2.5 mg  2.5 mg Nebulization Q6H PRN    allopurinol (ZYLOPRIM) tablet 100 mg  100 mg Oral DAILY    aspirin delayed-release tablet 81 mg  81 mg Oral DAILY    atorvastatin (LIPITOR) tablet 80 mg  80 mg Oral QHS    calcitRIOL (ROCALTROL) capsule 0.25 mcg  0.25 mcg Oral Q M, W, F & SAT    carvedilol (COREG) tablet 25 mg  25 mg Oral BID WITH MEALS    ergocalciferol (ERGOCALCIFEROL) capsule 50,000 Units  50,000 Units Oral every Tuesday    fluticasone (FLONASE) 50 mcg/actuation nasal spray 2 Spray  2 Spray Both Nostrils DAILY PRN    hydrOXYzine HCl (ATARAX) tablet 25 mg  25 mg Oral TID PRN    isosorbide mononitrate ER (IMDUR) tablet 120 mg  120 mg Oral DAILY    pantoprazole (PROTONIX) tablet 40 mg  40 mg Oral ACB    polyethylene glycol (MIRALAX) packet 17 g  17 g Oral DAILY    senna-docusate (PERICOLACE) 8.6-50 mg per tablet 1 Tab  1 Tab Oral DAILY    sodium chloride (NS) flush 5-10 mL  5-10 mL IntraVENous Q8H    sodium chloride (NS) flush 5-10 mL  5-10 mL IntraVENous PRN    naloxone (NARCAN) injection 0.4 mg  0.4 mg IntraVENous PRN    glucose chewable tablet 16 g  4 Tab Oral PRN    dextrose (D50W) injection syrg 12.5-25 g  12.5-25 g IntraVENous PRN    glucagon (GLUCAGEN) injection 1 mg  1 mg IntraMUSCular PRN    acetaminophen (TYLENOL) tablet 650 mg  650 mg Oral Q4H PRN    diphenhydrAMINE (BENADRYL) capsule 25 mg  25 mg Oral Q4H PRN    insulin lispro (HUMALOG) injection   SubCUTAneous AC&HS          Objective:     Vitals:  Blood pressure 149/80, pulse 78, temperature 98.8 °F (37.1 °C), resp. rate 18, height 5' 10\" (1.778 m), weight 138 kg (304 lb 3.8 oz), SpO2 98 %. Temp (24hrs), Av.6 °F (37 °C), Min:98.4 °F (36.9 °C), Max:98.9 °F (37.2 °C)      Intake and Output:      1901 -  0700  In: 740 [P.O.:720; I.V.:20]  Out: 3650 [Urine:650]    Physical Exam:               GENERAL ASSESSMENT: NAD  CHEST: CTA  HEART: S1S2  ABDOMEN: Soft,NT  SKIN DRY  JT: No acute findings  EXTREMITY: trace EDEMA          Data Review      No results for input(s): TNIPOC in the last 72 hours. No lab exists for component: ITNL   No results for input(s): CPK, CKMB, TROIQ in the last 72 hours. Recent Labs      18   NA  139  135*  134*   K  4.0  4.3  4.6   CL  100  101  100   CO2  30  30  25   BUN  42*  67*  93*   CREA  3.23*  3.60*  4.21*   GLU  84  167*  169*   PHOS  3.4  3.7  4.9*   CA  8.8  8.8  9.8   ALB  3.2*  3.3*  3.6   WBC   --   15.5*  15.6*   HGB   --   9.2*  9.5*   HCT   --   29.1*  30.2*   PLT   --   252  255      Recent Labs      18   0440   INR  2.1*  2.2*  2.2*   PTP  21.4*  22.1*  21.8*     Needs: urine analysis, urine sodium, protein and creatinine  Lab Results   Component Value Date/Time    Sodium urine, random 25 11/10/2014 12:40 PM    Creatinine, urine 145.29 2017 05:01 AM         : Sharyl Lesches, MD  2018        [facility]:  www.[email or web address]. [employer]  [place] office:  [address], [address] [place] [address],[address]  [place], [address] [facility]  Phone: [phone]  Fax :     [phone]    [place] office:  [address] [facility], [address] [place] [address]  Phone - [phone]  Fax - [phone]

## 2018-06-14 NOTE — PROGRESS NOTES
Patient's chart reviewed including history and physical, test results and imaging results for possible cath/angio/EP procedure.

## 2018-06-14 NOTE — PROGRESS NOTES
1930  Bedside and Verbal shift change report given to Geovany Larsen RN (oncoming nurse) by Lianna Disla RN (offgoing nurse). Report included the following information SBAR, Kardex, Intake/Output, MAR, Accordion and Recent Results. Patient had dialysis today. Finished 30 mins ago    2245  Patient complaining of generalized pain with pain scale of 9/10. Meds given. Visit Vitals    /77    Pulse 72    Temp 98.4 °F (36.9 °C)    Resp 16    Ht 5' 10\" (1.778 m)    Wt 138 kg (304 lb 3.8 oz)    SpO2 98%    BMI 43.65 kg/m2     0700  Bedside and Verbal shift change report given to Targeter Appenrique Ames (oncoming nurse) by Geovany Larsen RN (offgoing nurse). Report included the following information SBAR, Kardex, Intake/Output, MAR, Accordion and Recent Results.

## 2018-06-14 NOTE — PROGRESS NOTES
Cardiology Progress Note                             [address] [facility]. [address], [place], [address] [facility]                                 Phone [phone]; Fax [phone]        2018 9:23 AM     Admit Date:           2018  Admit Diagnosis:  Chest pain  Chest pain  :          1955   MRN:          [id number]   ASSESSMENT/RECOMMENDATION:   1)Chronic recurrent anginal chest pain, exertional and non exertional.  - CAD with 1v disease/RCA  -cont ASA/statin/BB  - LHC cath unable to be done d/t respiratory status. To be done OP  -on high dose of imdur       2) Chronic HFpEF, class III, s/p CardioMEMS for PA monitoring:   - RHC yesterday showing Moderate-severe PA HTN, post capillary. Marked elevation in biV filling pressures. Cardiomem re- calibrated   - defer diuretics to nephrology (transitioning to PO today), also now on HD      3) CKD  - HD -3 L  -Defer diuretics to nephrology - switch bumex 2 mg daily d/w Dr. Jazmín Cruz   -  AV fistula functioning, but difficulty accessing awaiting yvette cath placement       4) Chronic severe anemia, refractory to Procrit:   - hematology following   S/p BMB- normal   H/H stable      5) Chronic anticoagulation for DVT   - s/p Vit K and FFP (prior to BM biopsy)   - coumadin on hold. D/w Dr Jazmín Cruz, holding coumadin for upcoming yvette cath placement (INE <2). Can start sub Q heparin for DVT prophylaxis once INR is subtherapeutic .  INR 2.1 today    6) Hypertension: better today, Hydralazine 100 mg to TID  - coreg/norvasc/imdur.   - low dose clonidine can titrate as needed    7) Hypo magensium: resolved     8) Chronic hypoxic resp failure:  Breathing better today, edema improved of Chest Xray (prior to HD yesterday)  -Pulmonary s/o  -CT of chest this AM showing new groundglass areas of opacity and mild consolidation in the right upper lobe and lingula most consistent with superimposed pneumonia or alveolitis on chronic interstitial lung disease.   -prn nebs   -afebrile     9) Leukocytosis: WBC @15 6/13          Hopefully yvette cath placement tomorrow, HD then d/c home  Future Appointments  Date Time Provider Meagan Mcintyre   6/19/2018 2:20 PM Onur Ellison MD [address] 3 Recorded Weights in this Encounter    06/12/18 0438 06/13/18 0038 06/14/18 0027   Weight: 316 lb 8 oz (143.6 kg) 311 lb 1.6 oz (141.1 kg) 304 lb 3.8 oz (138 kg)         06/12 1901 - 06/14 0700  In: 740 [P.O.:720; I.V.:20]  Out: [address] [place] [address] had HD yesterday, feels much better after removal of 3L. Awaiting placement of HD port once INR is less than 2.  Breathing improved, no CP, less LE edema        Current Facility-Administered Medications   Medication Dose Route Frequency    epoetin charlie (EPOGEN;PROCRIT) injection 15,000 Units  15,000 Units IntraVENous DIALYSIS MON, WED & FRI    heparin (porcine) 1,000 unit/mL injection 5,000 Units  5,000 Units InterCATHeter Multiple    lidocaine-prilocaine (EMLA) 2.5-2.5 % cream   Topical PRN    cloNIDine HCl (CATAPRES) tablet 0.1 mg  0.1 mg Oral BID    predniSONE (DELTASONE) tablet 20 mg  20 mg Oral DAILY WITH BREAKFAST    insulin NPH (NOVOLIN N, HUMULIN N) injection 15 Units  15 Units SubCUTAneous Q12H    prochlorperazine (COMPAZINE) injection 5 mg  5 mg IntraVENous Q8H PRN    albuterol-ipratropium (DUO-NEB) 2.5 MG-0.5 MG/3 ML  3 mL Nebulization Q8H RT    labetalol (NORMODYNE;TRANDATE) injection 20 mg  20 mg IntraVENous Q4H PRN    hydrALAZINE (APRESOLINE) tablet 100 mg  100 mg Oral TID    sodium chloride (OCEAN) 0.65 % nasal squeeze bottle 2 Spray  2 Spray Both Nostrils Q2H PRN    HYDROmorphone (DILAUDID) syringe 0.5 mg  0.5 mg IntraVENous Q4H PRN    metOLazone (ZAROXOLYN) tablet 5 mg  5 mg Oral DAILY    ondansetron (ZOFRAN ODT) tablet 8 mg  8 mg Oral Q8H PRN    magnesium oxide (MAG-OX) tablet 400 mg  400 mg Oral BID    sodium chloride (NS) flush 5-10 mL  5-10 mL IntraVENous Q8H    sodium chloride (NS) flush 5-10 mL  5-10 mL IntraVENous PRN    bumetanide (BUMEX) injection 2 mg  2 mg IntraVENous BID    0.9% sodium chloride infusion 250 mL  250 mL IntraVENous PRN    nitroglycerin (NITROSTAT) tablet 0.4 mg  0.4 mg SubLINGual PRN    oxyCODONE-acetaminophen (PERCOCET 7.5) 7.5-325 mg per tablet 1 Tab  1 Tab Oral Q6H PRN    Warfarin: pharmacy to dose    Other Rx Dosing/Monitoring    amLODIPine (NORVASC) tablet 10 mg  10 mg Oral DAILY    albuterol (PROVENTIL VENTOLIN) nebulizer solution 2.5 mg  2.5 mg Nebulization Q6H PRN    allopurinol (ZYLOPRIM) tablet 100 mg  100 mg Oral DAILY    aspirin delayed-release tablet 81 mg  81 mg Oral DAILY    atorvastatin (LIPITOR) tablet 80 mg  80 mg Oral QHS    calcitRIOL (ROCALTROL) capsule 0.25 mcg  0.25 mcg Oral Q M, W, F & SAT    carvedilol (COREG) tablet 25 mg  25 mg Oral BID WITH MEALS    ergocalciferol (ERGOCALCIFEROL) capsule 50,000 Units  50,000 Units Oral every Tuesday    fluticasone (FLONASE) 50 mcg/actuation nasal spray 2 Spray  2 Spray Both Nostrils DAILY PRN    hydrOXYzine HCl (ATARAX) tablet 25 mg  25 mg Oral TID PRN    isosorbide mononitrate ER (IMDUR) tablet 120 mg  120 mg Oral DAILY    pantoprazole (PROTONIX) tablet 40 mg  40 mg Oral ACB    polyethylene glycol (MIRALAX) packet 17 g  17 g Oral DAILY    senna-docusate (PERICOLACE) 8.6-50 mg per tablet 1 Tab  1 Tab Oral DAILY    sodium chloride (NS) flush 5-10 mL  5-10 mL IntraVENous Q8H    sodium chloride (NS) flush 5-10 mL  5-10 mL IntraVENous PRN    naloxone (NARCAN) injection 0.4 mg  0.4 mg IntraVENous PRN    glucose chewable tablet 16 g  4 Tab Oral PRN    dextrose (D50W) injection syrg 12.5-25 g  12.5-25 g IntraVENous PRN    glucagon (GLUCAGEN) injection 1 mg  1 mg IntraMUSCular PRN    acetaminophen (TYLENOL) tablet 650 mg  650 mg Oral Q4H PRN    diphenhydrAMINE (BENADRYL) capsule 25 mg  25 mg Oral Q4H PRN    insulin lispro (HUMALOG) injection   SubCUTAneous AC&HS      OBJECTIVE               Intake/Output Summary (Last 24 hours) at 06/14/18 0951  Last data filed at 06/14/18 0659   Gross per 24 hour   Intake              500 ml   Output             3650 ml   Net            -3150 ml       Review of Systems - History obtained from the patient AS PER  HPI    Telemetry NSR    PHYSICAL EXAM        Visit Vitals    /80    Pulse 78    Temp 98.8 °F (37.1 °C)    Resp 18    Ht 5' 10\" (1.778 m)    Wt 304 lb 3.8 oz (138 kg)    SpO2 98%    BMI 43.65 kg/m2       Gen: Well-developed, obese, in no acute distress  alert and oriented x 3  HEENT:  Pink conjunctivae, Hearing grossly normal.No scleral icterus or conjunctival, moist mucous membranes  Neck: Supple, JVD difficult to appreciate. CVL right IJ  Resp: No accessory muscle use, rhonchi bLL  Card: Regular Rate,Rythm, No murmurs, rubs or gallop. No thrills. GI:          soft, non-tender   MSK: No cyanosis or clubbing, good capillary refill  Skin: No rashes or ulcers, no bruising. Neuro:  Cranial nerves are grossly intact, moving all four extremities, no focal deficit, follows commands appropriately  Psych:  Good insight, oriented to person, place and time, alert, Nml Affect  LE: trace BLE  edema. AV fistula LUE       DATA REVIEW            Laboratory and Imaging have been reviewed by me and are notable for  No results for input(s): CPK, CKMB, TROIQ in the last 72 hours.   Recent Labs      06/14/18   0405  06/13/18   0025  06/12/18   0440   NA  139  135*  134*   K  4.0  4.3  4.6   CO2  30  30  25   BUN  42*  67*  93*   CREA  3.23*  3.60*  4.21*   GLU  84  167*  169*   PHOS  3.4  3.7  4.9*   WBC   --   15.5*  15.6*   HGB   --   9.2*  9.5*   HCT   --   29.1*  30.2*   PLT   --   252  [address] [facility],

## 2018-06-14 NOTE — PROGRESS NOTES
Problem: Falls - Risk of  Goal: *Absence of Falls  Document Sarah Fall Risk and appropriate interventions in the flowsheet. Outcome: Progressing Towards Goal  Fall Risk Interventions:  Mobility Interventions: Assess mobility with egress test         Medication Interventions: Bed/chair exit alarm, Patient to call before getting OOB, Teach patient to arise slowly    Elimination Interventions: Bed/chair exit alarm, Call light in reach, Patient to call for help with toileting needs    History of Falls Interventions: Bed/chair exit alarm, Evaluate medications/consider consulting pharmacy, Investigate reason for fall, Room close to nurse's station        Problem: Pressure Injury - Risk of  Goal: *Prevention of pressure injury  Document Randy Scale and appropriate interventions in the flowsheet. Outcome: Progressing Towards Goal  Pressure Injury Interventions:             Activity Interventions: Increase time out of bed, Pressure redistribution bed/mattress(bed type), PT/OT evaluation    Mobility Interventions: HOB 30 degrees or less, Pressure redistribution bed/mattress (bed type), PT/OT evaluation    Nutrition Interventions: Document food/fluid/supplement intake, Discuss nutritional consult with provider    Friction and Shear Interventions: Apply protective barrier, creams and emollients, HOB 30 degrees or less, Lift team/patient mobility team, Transferring/repositioning devices

## 2018-06-14 NOTE — PROGRESS NOTES
Problem: Mobility Impaired (Adult and Pediatric)  Goal: *Acute Goals and Plan of Care (Insert Text)  Physical Therapy Goals  Initiated 6/6/2018   1. Patient will ambulate with modified independence for 100 feet with the least restrictive device within 5 day(s). 5.  Patient will ascend/descend 4 stairs with 1 handrail(s) with supervision/set-up within 5 day(s). 3.. Patient will be able to articulate 3 strategies she will use to conserve energy upon return home. physical Therapy TREATMENT  Patient: Lorene Cannon (9 y.o. female)  Date: 6/14/2018  Diagnosis: Chest pain  Chest pain <principal problem not specified>       Precautions:    Chart, physical therapy assessment, plan of care and goals were reviewed. ASSESSMENT:  Pt received din chair, agreeable to participate with physical therapy. Using 2L supplemental O2 for functional mobility. CGA/SBA for functional tranfers, pt demonstrates good standing balance with short bouts of gait training and BR transfers unsupported. Gait training x 150 using rollator with SBA. Two standing rest breaks secondary to RLE fatigue and  \"tightness and cramping\" Pt returned to chair, one family member present during session   Progression toward goals:  []    Improving appropriately and progressing toward goals  [x]    Improving slowly and progressing toward goals  []    Not making progress toward goals and plan of care will be adjusted     PLAN:  Patient continues to benefit from skilled intervention to address the above impairments. Continue treatment per established plan of care. Discharge Recommendations:  Home Health  Further Equipment Recommendations for Discharge:  None- owns rollator     SUBJECTIVE:   Patient stated Richad Shadow feel better.     OBJECTIVE DATA SUMMARY:   Critical Behavior:  Neurologic State: Alert  Orientation Level: Oriented X4  Cognition: Appropriate decision making, Follows commands  Safety/Judgement: Awareness of environment  Functional Mobility Training:  Bed Mobility:                    Transfers:  Sit to Stand: Contact guard assistance  Stand to Sit: Contact guard assistance                             Balance:  Sitting: Intact  Standing: Intact; With support  Standing - Static: Good  Standing - Dynamic : Good  Ambulation/Gait Training:  Distance (ft): 150 Feet (ft)  Assistive Device: Walker, rollator;Gait belt  Ambulation - Level of Assistance: Stand-by assistance        Gait Abnormalities: Trunk sway increased        Base of Support: Widened                             Stairs:              Neuro Re-Education:    Therapeutic Exercises:     Pain:  Pain Scale 1: Numeric (0 - 10)  Pain Intensity 1: 0              Activity Tolerance:   Good  Please refer to the flowsheet for vital signs taken during this treatment.   After treatment:   [x]    Patient left in no apparent distress sitting up in chair  []    Patient left in no apparent distress in bed  [x]    Call bell left within reach  [x]    Nursing notified  [x]    Caregiver present  []    Bed alarm activated    COMMUNICATION/COLLABORATION:   The patients plan of care was discussed with: Registered Nurse    Eduardo Larsen   Time Calculation: 24 mins

## 2018-06-15 ENCOUNTER — HOME HEALTH ADMISSION (OUTPATIENT)
Dept: HOME HEALTH SERVICES | Facility: HOME HEALTH | Age: 63
End: 2018-06-15

## 2018-06-15 VITALS
SYSTOLIC BLOOD PRESSURE: 109 MMHG | HEIGHT: 70 IN | WEIGHT: 293 LBS | OXYGEN SATURATION: 97 % | HEART RATE: 80 BPM | TEMPERATURE: 99 F | DIASTOLIC BLOOD PRESSURE: 71 MMHG | BODY MASS INDEX: 41.95 KG/M2 | RESPIRATION RATE: 18 BRPM

## 2018-06-15 PROBLEM — R07.9 CHEST PAIN: Status: RESOLVED | Noted: 2017-11-21 | Resolved: 2018-06-15

## 2018-06-15 LAB
ALBUMIN SERPL-MCNC: 3.2 G/DL (ref 3.5–5)
ANION GAP SERPL CALC-SCNC: 8 MMOL/L (ref 5–15)
BASOPHILS # BLD: 0 K/UL (ref 0–0.1)
BASOPHILS NFR BLD: 0 % (ref 0–1)
BUN SERPL-MCNC: 59 MG/DL (ref 6–20)
BUN/CREAT SERPL: 13 (ref 12–20)
CALCIUM SERPL-MCNC: 9 MG/DL (ref 8.5–10.1)
CHLORIDE SERPL-SCNC: 96 MMOL/L (ref 97–108)
CO2 SERPL-SCNC: 29 MMOL/L (ref 21–32)
CREAT SERPL-MCNC: 4.47 MG/DL (ref 0.55–1.02)
DIFFERENTIAL METHOD BLD: ABNORMAL
EOSINOPHIL # BLD: 0.2 K/UL (ref 0–0.4)
EOSINOPHIL NFR BLD: 1 % (ref 0–7)
ERYTHROCYTE [DISTWIDTH] IN BLOOD BY AUTOMATED COUNT: 16.9 % (ref 11.5–14.5)
GLUCOSE BLD STRIP.AUTO-MCNC: 172 MG/DL (ref 65–100)
GLUCOSE BLD STRIP.AUTO-MCNC: 185 MG/DL (ref 65–100)
GLUCOSE SERPL-MCNC: 192 MG/DL (ref 65–100)
HCT VFR BLD AUTO: 28.1 % (ref 35–47)
HGB BLD-MCNC: 8.8 G/DL (ref 11.5–16)
IMM GRANULOCYTES # BLD: 0 K/UL (ref 0–0.04)
IMM GRANULOCYTES NFR BLD AUTO: 0 % (ref 0–0.5)
INR PPP: 2.2 (ref 0.9–1.1)
LYMPHOCYTES # BLD: 3.3 K/UL (ref 0.8–3.5)
LYMPHOCYTES NFR BLD: 18 % (ref 12–49)
MCH RBC QN AUTO: 29.9 PG (ref 26–34)
MCHC RBC AUTO-ENTMCNC: 31.3 G/DL (ref 30–36.5)
MCV RBC AUTO: 95.6 FL (ref 80–99)
MONOCYTES # BLD: 1.3 K/UL (ref 0–1)
MONOCYTES NFR BLD: 7 % (ref 5–13)
MYELOCYTES NFR BLD MANUAL: 1 %
NEUTS SEG # BLD: 13.4 K/UL (ref 1.8–8)
NEUTS SEG NFR BLD: 73 % (ref 32–75)
NRBC # BLD: 0.05 K/UL (ref 0–0.01)
NRBC BLD-RTO: 0.3 PER 100 WBC
PHOSPHATE SERPL-MCNC: 3.5 MG/DL (ref 2.6–4.7)
PLATELET # BLD AUTO: 255 K/UL (ref 150–400)
PMV BLD AUTO: 10.5 FL (ref 8.9–12.9)
POTASSIUM SERPL-SCNC: 4.4 MMOL/L (ref 3.5–5.1)
PROTHROMBIN TIME: 22 SEC (ref 9–11.1)
RBC # BLD AUTO: 2.94 M/UL (ref 3.8–5.2)
RBC MORPH BLD: ABNORMAL
SERVICE CMNT-IMP: ABNORMAL
SERVICE CMNT-IMP: ABNORMAL
SODIUM SERPL-SCNC: 133 MMOL/L (ref 136–145)
WBC # BLD AUTO: 18.4 K/UL (ref 3.6–11)

## 2018-06-15 PROCEDURE — 74011636637 HC RX REV CODE- 636/637: Performed by: INTERNAL MEDICINE

## 2018-06-15 PROCEDURE — 74011250637 HC RX REV CODE- 250/637: Performed by: INTERNAL MEDICINE

## 2018-06-15 PROCEDURE — 36415 COLL VENOUS BLD VENIPUNCTURE: CPT | Performed by: NURSE PRACTITIONER

## 2018-06-15 PROCEDURE — 85025 COMPLETE CBC W/AUTO DIFF WBC: CPT | Performed by: INTERNAL MEDICINE

## 2018-06-15 PROCEDURE — 74011250636 HC RX REV CODE- 250/636: Performed by: INTERNAL MEDICINE

## 2018-06-15 PROCEDURE — 90935 HEMODIALYSIS ONE EVALUATION: CPT

## 2018-06-15 PROCEDURE — 74011250637 HC RX REV CODE- 250/637: Performed by: NURSE PRACTITIONER

## 2018-06-15 PROCEDURE — 85610 PROTHROMBIN TIME: CPT | Performed by: NURSE PRACTITIONER

## 2018-06-15 PROCEDURE — 82962 GLUCOSE BLOOD TEST: CPT

## 2018-06-15 PROCEDURE — 80069 RENAL FUNCTION PANEL: CPT | Performed by: INTERNAL MEDICINE

## 2018-06-15 RX ORDER — CLONIDINE HYDROCHLORIDE 0.2 MG/1
0.2 TABLET ORAL 2 TIMES DAILY
Qty: 60 TAB | Refills: 1 | Status: ON HOLD | OUTPATIENT
Start: 2018-06-15 | End: 2018-06-18 | Stop reason: CLARIF

## 2018-06-15 RX ORDER — HYDRALAZINE HYDROCHLORIDE 50 MG/1
50 TABLET, FILM COATED ORAL 3 TIMES DAILY
Qty: 90 TAB | Refills: 1 | Status: SHIPPED | OUTPATIENT
Start: 2018-06-15 | End: 2018-09-21

## 2018-06-15 RX ORDER — CLONIDINE HYDROCHLORIDE 0.1 MG/1
0.2 TABLET ORAL 2 TIMES DAILY
Status: DISCONTINUED | OUTPATIENT
Start: 2018-06-15 | End: 2018-06-15 | Stop reason: HOSPADM

## 2018-06-15 RX ORDER — BUMETANIDE 1 MG/1
2 TABLET ORAL 2 TIMES DAILY
Qty: 30 TAB | Refills: 0 | Status: SHIPPED | OUTPATIENT
Start: 2018-06-15 | End: 2019-01-08 | Stop reason: ALTCHOICE

## 2018-06-15 RX ORDER — METOLAZONE 5 MG/1
5 TABLET ORAL DAILY
Qty: 30 TAB | Refills: 1 | Status: SHIPPED | OUTPATIENT
Start: 2018-06-15 | End: 2019-01-08 | Stop reason: ALTCHOICE

## 2018-06-15 RX ORDER — HYDRALAZINE HYDROCHLORIDE 25 MG/1
50 TABLET, FILM COATED ORAL 3 TIMES DAILY
Status: DISCONTINUED | OUTPATIENT
Start: 2018-06-15 | End: 2018-06-15 | Stop reason: HOSPADM

## 2018-06-15 RX ADMIN — STANDARDIZED SENNA CONCENTRATE AND DOCUSATE SODIUM 1 TABLET: 8.6; 5 TABLET, FILM COATED ORAL at 13:45

## 2018-06-15 RX ADMIN — Medication 10 ML: at 06:10

## 2018-06-15 RX ADMIN — BUMETANIDE 2 MG: 1 TABLET ORAL at 13:45

## 2018-06-15 RX ADMIN — ERYTHROPOIETIN 15000 UNITS: 10000 INJECTION, SOLUTION INTRAVENOUS; SUBCUTANEOUS at 13:14

## 2018-06-15 RX ADMIN — INSULIN LISPRO 3 UNITS: 100 INJECTION, SOLUTION INTRAVENOUS; SUBCUTANEOUS at 13:44

## 2018-06-15 RX ADMIN — ISOSORBIDE MONONITRATE 120 MG: 60 TABLET ORAL at 13:45

## 2018-06-15 RX ADMIN — ASPIRIN 81 MG: 81 TABLET, COATED ORAL at 13:45

## 2018-06-15 RX ADMIN — METOLAZONE 5 MG: 5 TABLET ORAL at 13:45

## 2018-06-15 RX ADMIN — ONDANSETRON 8 MG: 4 TABLET, ORALLY DISINTEGRATING ORAL at 10:29

## 2018-06-15 RX ADMIN — AMLODIPINE BESYLATE 10 MG: 5 TABLET ORAL at 13:45

## 2018-06-15 RX ADMIN — ALLOPURINOL 100 MG: 100 TABLET ORAL at 13:45

## 2018-06-15 RX ADMIN — POLYETHYLENE GLYCOL (3350) 17 G: 17 POWDER, FOR SOLUTION ORAL at 13:45

## 2018-06-15 RX ADMIN — Medication 400 MG: at 13:45

## 2018-06-15 RX ADMIN — HYDROMORPHONE HYDROCHLORIDE 0.5 MG: 1 INJECTION, SOLUTION INTRAMUSCULAR; INTRAVENOUS; SUBCUTANEOUS at 01:18

## 2018-06-15 RX ADMIN — HYDROMORPHONE HYDROCHLORIDE 0.5 MG: 1 INJECTION, SOLUTION INTRAMUSCULAR; INTRAVENOUS; SUBCUTANEOUS at 10:29

## 2018-06-15 RX ADMIN — CALCITRIOL 0.25 MCG: 0.25 CAPSULE, LIQUID FILLED ORAL at 13:45

## 2018-06-15 RX ADMIN — CARVEDILOL 25 MG: 12.5 TABLET, FILM COATED ORAL at 13:56

## 2018-06-15 NOTE — PROGRESS NOTES
[facility]  [phone]    Vitals   Pre   Post   Assessment   Pre   Post     Temp  99.1  98.2 LOC  A/O x 3 A/O x 3   HR   80 67 Lungs   Clear, 2L O2   clear, 2L O2   B/P   149/67 177/85 Cardiac   Regular  Regular   Resp   16 16 Skin   Warm, Dry  Warm, Dry   Pain level  5-no pain med needed at this time  0 Edema    Trace   Trace   Orders:    Duration:   Start:   0900 End:   1230 Total:   3.5Hr    Dialyzer:   Dialyzer/Set Up Inspection: Trudy David (06/15/18 0845)   K Bath:   Dialysate K (mEq/L): 3 (06/15/18 0845)   Ca Bath:   Dialysate CA (mEq/L): 2.5 (06/15/18 0845)   Na/Bicarb:   Dialysate NA (mEq/L): 140 (06/15/18 0845)   Target Fluid Removal:   Goal/Amount of Fluid to Remove (mL): 3000 mL (06/15/18 0845)   Access     Type & Location:   RIJ, no sxs of infection, +aspiration/+flush x 2, dressing DIT with biopatch. Post-flushed with NS and recapped. Labs     Obtained/Reviewed   Critical Results Called   Date when labs were drawn-  Hgb-    HGB   Date Value Ref Range Status   06/15/2018 8.8 (L) 11.5 - 16.0 g/dL Final     K-    Potassium   Date Value Ref Range Status   06/15/2018 4.4 3.5 - 5.1 mmol/L Final     Ca-   Calcium   Date Value Ref Range Status   06/15/2018 9.0 8.5 - 10.1 MG/DL Final     Bun-   BUN   Date Value Ref Range Status   06/15/2018 59 (H) 6 - 20 MG/DL Final     Creat-   Creatinine   Date Value Ref Range Status   06/15/2018 4.47 (H) 0.55 - 1.02 MG/DL Final     Comment:     INVESTIGATED PER DELTA CHECK PROTOCOL        Medications/ Blood Products Given     Name   Dose   Route and Time     EPO  15,000units 1   IVP              Blood Volume Processed (BVP):    66.1L Net Fluid   Removed:  3000ml   Comments   Time Out Done: 8940  Primary Nurse Rpt Pre: Sybil Chavez  Primary Nurse Rpt Post: Sybil Chavez   Pt Education: procedural, fistulas vs cath. Care Plan: Continue HD   Tx Summary:3.5hrs completed. Tolerated well.  Post- returned all possible blood to pt, pt resting in bed in no distress. Pt's previous clinic- N/A   Consent signed - Informed Consent Verified: Yes (06/15/18 0845)  Farhanita Consent - yes  Hepatitis Status- Neg Atigen 6/12/18  Machine #- Machine Number: [id number] (06/15/18 0845)  Telemetry status-N/A  Pre-dialysis wt. - Pre-Dialysis Weight: 141 kg (310 lb 13.6 oz) (06/15/18 0845)

## 2018-06-15 NOTE — ROUTINE PROCESS
8370  Attempted to administer morning medications. Patient stated she would take them \"later. \"    [id number]  Bedside and Verbal shift change report given to Connie (oncoming nurse) by Octaviano Winn (offgoing nurse). Report included the following information SBAR, Kardex, ED Summary, Procedure Summary, Intake/Output, MAR, Recent Results and Cardiac Rhythm NSR.

## 2018-06-15 NOTE — PROGRESS NOTES
Attempted to remove Temp HD catheter, patient still be run by HD. Per HD nurses, run will be done in about 1.5 hours. Will come back in about 2 hours and remove line. Dr. Susie Kenny made aware of unable to remove currently because of patient still being on machine.

## 2018-06-15 NOTE — PROGRESS NOTES
12:28 PM  I called Dirk Anderson - still waiting to confirm time with [facility]OOOMERS. H2H - changed to CHF instead of DM. CHANDA Burris       [facility], they are waiting on some final details  from insurance. Pt is interested in [facility] - DM, CHF. I have sent a referral to [facility] in [address] [place] [address].  Thanks CHANDA Burris

## 2018-06-15 NOTE — PROGRESS NOTES
Cardiology Daily Progress Note      Mervat Lindsay is a 63 y.o. female with HTN, Diastolic CHF, CAD, ESRD, now on HD. BP better with titration of meds. 3L negative with each dialysis. Feels better with SOB.      Tele show PVC occasional.       Visit Vitals    /71    Pulse 77    Temp 99.1 °F (37.3 °C) (Oral)  Comment: pre tx     Resp 16    Ht 5' 10\" (1.778 m)    Wt 310 lb 13.6 oz (141 kg)    SpO2 98%    BMI 44.6 kg/m2       Intake/Output Summary (Last 24 hours) at 06/15/18 1013  Last data filed at 06/15/18 0825   Gross per 24 hour   Intake              870 ml   Output             1000 ml   Net             -130 ml     General appearance - alert, well appearing, and in no distress  Mental status - affect appropriate to mood  Eyes - sclera anicteric, moist mucous membranes  Neck - supple, no significant adenopathy  Chest - clear to auscultation, no wheezes, rales or rhonchi  Heart - normal rate, regular rhythm, normal S1, S2, no murmurs, rubs, clicks or gallops  Abdomen - soft, nontender, nondistended, no masses or organomegaly  Extremities - peripheral pulses normal, no pedal edema    Current Facility-Administered Medications   Medication Dose Route Frequency    cloNIDine HCl (CATAPRES) tablet 0.2 mg  0.2 mg Oral BID    hydrALAZINE (APRESOLINE) tablet 50 mg  50 mg Oral TID    bumetanide (BUMEX) tablet 2 mg  2 mg Oral DAILY    epoetin charlie (EPOGEN;PROCRIT) injection 15,000 Units  15,000 Units IntraVENous DIALYSIS MON, WED & FRI    heparin (porcine) 1,000 unit/mL injection 5,000 Units  5,000 Units InterCATHeter Multiple    lidocaine-prilocaine (EMLA) 2.5-2.5 % cream   Topical PRN    insulin NPH (NOVOLIN N, HUMULIN N) injection 15 Units  15 Units SubCUTAneous Q12H    prochlorperazine (COMPAZINE) injection 5 mg  5 mg IntraVENous Q8H PRN    albuterol-ipratropium (DUO-NEB) 2.5 MG-0.5 MG/3 ML  3 mL Nebulization Q8H RT    labetalol (NORMODYNE;TRANDATE) injection 20 mg  20 mg IntraVENous Q4H PRN    sodium chloride (OCEAN) 0.65 % nasal squeeze bottle 2 Spray  2 Spray Both Nostrils Q2H PRN    HYDROmorphone (DILAUDID) syringe 0.5 mg  0.5 mg IntraVENous Q4H PRN    metOLazone (ZAROXOLYN) tablet 5 mg  5 mg Oral DAILY    ondansetron (ZOFRAN ODT) tablet 8 mg  8 mg Oral Q8H PRN    magnesium oxide (MAG-OX) tablet 400 mg  400 mg Oral BID    sodium chloride (NS) flush 5-10 mL  5-10 mL IntraVENous Q8H    sodium chloride (NS) flush 5-10 mL  5-10 mL IntraVENous PRN    0.9% sodium chloride infusion 250 mL  250 mL IntraVENous PRN    nitroglycerin (NITROSTAT) tablet 0.4 mg  0.4 mg SubLINGual PRN    oxyCODONE-acetaminophen (PERCOCET 7.5) 7.5-325 mg per tablet 1 Tab  1 Tab Oral Q6H PRN    Warfarin: pharmacy to dose    Other Rx Dosing/Monitoring    amLODIPine (NORVASC) tablet 10 mg  10 mg Oral DAILY    albuterol (PROVENTIL VENTOLIN) nebulizer solution 2.5 mg  2.5 mg Nebulization Q6H PRN    allopurinol (ZYLOPRIM) tablet 100 mg  100 mg Oral DAILY    aspirin delayed-release tablet 81 mg  81 mg Oral DAILY    atorvastatin (LIPITOR) tablet 80 mg  80 mg Oral QHS    calcitRIOL (ROCALTROL) capsule 0.25 mcg  0.25 mcg Oral Q M, W, F & SAT    carvedilol (COREG) tablet 25 mg  25 mg Oral BID WITH MEALS    ergocalciferol (ERGOCALCIFEROL) capsule 50,000 Units  50,000 Units Oral every Tuesday    fluticasone (FLONASE) 50 mcg/actuation nasal spray 2 Spray  2 Spray Both Nostrils DAILY PRN    hydrOXYzine HCl (ATARAX) tablet 25 mg  25 mg Oral TID PRN    isosorbide mononitrate ER (IMDUR) tablet 120 mg  120 mg Oral DAILY    pantoprazole (PROTONIX) tablet 40 mg  40 mg Oral ACB    polyethylene glycol (MIRALAX) packet 17 g  17 g Oral DAILY    senna-docusate (PERICOLACE) 8.6-50 mg per tablet 1 Tab  1 Tab Oral DAILY    sodium chloride (NS) flush 5-10 mL  5-10 mL IntraVENous Q8H    sodium chloride (NS) flush 5-10 mL  5-10 mL IntraVENous PRN    naloxone (NARCAN) injection 0.4 mg  0.4 mg IntraVENous PRN    glucose chewable tablet 16 g  4 Tab Oral PRN    dextrose (D50W) injection syrg 12.5-25 g  12.5-25 g IntraVENous PRN    glucagon (GLUCAGEN) injection 1 mg  1 mg IntraMUSCular PRN    acetaminophen (TYLENOL) tablet 650 mg  650 mg Oral Q4H PRN    diphenhydrAMINE (BENADRYL) capsule 25 mg  25 mg Oral Q4H PRN    insulin lispro (HUMALOG) injection   SubCUTAneous AC&HS        CATH: 2004: 1v CAD by cath - PCI OM with SAL  CATH 5/2004 - patent stent, no new disease   Lexiscan cardiolite 12/09- normal perfusion, EF 66%  ECHO 11/2009: LVH, EF 41%, diastolic dysfunction  STRESS/LEXISCAN/CARDIOLITE 3/29/11: normal perfusion, EF 62%  CATH: 3/20/2012 :PA 55/30 mean 41, wedge 26, RA 24, EDP 35, LVG 55%, LM normal, LAD normal, LCX - OM1 patent stent, OM2 prox 85% long, % w/ L -> R collateral; s/p SAL-> OM2/OM3 with SAL. CATH: 10/4/2012: EDP 25, EF 55%, LM nl, LAD mild plaque, LCX patent OM stents, % prox with good L to R collaterals. Cath 3/18/2014 - RA 9 PA 42/19/29, PCW 12, EDP 25, no LVG due to CKD, LM nl, LAD mild plaque, LCX patent stents OM1/OM2, RCA chronic proximal occlusion with L to R collaterals. ECHO 4/11/16: EF 60-65%. No WMA. LA mildly dilated. Lexiscan Cardiolite 4/11/16: Normal. LVEF 55%. Compared to 3/29/11, no significant changes. RHC/CardioMEMS implant 8/31/17 - RA 12, PA 56/20/30, CI (Mayte) 2.65    Echo 11/30/17 - EF 65%. G1DD. No WMA. Wall thickness was mildly to moderately increased. Limited Echo 12/13/17 - Tricuspid valve: Pulmonary artery diastolic pressure: 14.53 mmHg. [address] 6/5/18 Moderate-severe PA HTN, post capillary  Marked elevation in biV filling pressures    Assessment:    - Angina  - CAD  - HTN  - HFrEF  - CKD  - Anemia        Plan:     - Undergoing HD today with plan for another 3L fluid off. - BP still a bit high. Will increase Clonidine to 0.2BID. Reduce Hydralazine to 50 TID to improve compliance and side effects.   - OK to DC home from cardiac standpoint.  She will f/u with Dr. Watson Perkins as OP and OP cath. - Anticoagulation decisions for DVT per primary team.            ___________________________________________________    Luma Ovalle.  Haris Duckworth MD, [facility] [place]

## 2018-06-15 NOTE — PROGRESS NOTES
SHIFT CHANGE:  7:55 AM Report received from Rowena June RN. SBAR, Kardex, Procedure Summary, Intake/Output, MAR, Recent Results, Med Rec Status and Cardiac Rhythm NSR were discussed. SHIFT SUMMARY:  8:46 AM  Dialysis equipment in the room with patient and dialysis to be started soon. Morning medications held. Patient refusing insulin. 10:30 AM  Patient medicated for 8/10 generalized pain. Given PRN Zofran and PRN Dilaudid ordered  END OF SHIFT REPORT:    4:04 PM Central line and Monico catheter removed by [employer]. Telemetry removed. Discharge instructions reviewed with patient and  and time allowed with questions. Patient ready for discharge.

## 2018-06-15 NOTE — PROGRESS NOTES
Ezio Craig [facility] [address]  [address] [facility], [place], [address] [place] [address]  [phone]      Medical Progress Note      NAME: Moe Chisholm   :  1955  MRM:  [id number]    Date/Time: 6/15/2018  7:26 AM       Assessment and Plan:   1. Chronic respiratory failure with hypoxia / COPD, severe / ILD (interstitial lung disease) / Pulmonary HTN - continue diuretics. Palliative care consulted. Continue Oxygen as needed. CT scan of the chest from  noted. Pulmonary signed off. Wean steroid.       2. Parainfluenza virus - Supportive care.        3. Chronic diastolic heart failure - Continue bumex at higher dose and metolazone, strict I's and O's, daily weights, low salt diet. Cardiology s/o       4. DM (diabetes mellitus), type 2 with renal, vascular and neurological complications - Diabetic diet and counseling.  SSI per protocol. Started on NPH.     5. ESRD- Nephrology following. Started HD on . Plan for permcath once INR is acceptable.       6. CAD (coronary Artery Disease) Native Artery / HTN (hypertension) / Chest pain / Dyspnea - Pain chronic, unlikely AMI. Cath failed. Continue ASA, statin, BB, Imdur, as medical management.  No further plans for cath or workup.      7. Gastroparesis / Esophageal reflux - PPI.       8. Morbid obesity / JASEN on CPAP - continue CPAP, advise weight loss       9. Normocytic anemia - POA, heme/onc consulted. Pierre Hopkins just chronic disease. s/p BMB, pathology negative. EPO per renal.      10. Hx of deep venous thrombosis / Warfarin anticoagulation - Holding Coumadin for permcath placement.       11. Gout - stable on allopurinol                Subjective:     Chief Complaint:  Follow up of pt who was admitted with respiratory failure. SOB is getting better     ROS:  (bold if positive, if negative)      Tolerating PT  Tolerating Diet        Objective:     Last 24hrs VS reviewed since prior progress note.  Most recent are:    Visit Vitals    /76 (BP 1 Location: Right arm, BP Patient Position: At rest)    Pulse 65    Temp 98 °F (36.7 °C)    Resp 18    Ht 5' 10\" (1.778 m)    Wt 141 kg (310 lb 13.6 oz)    SpO2 96%    BMI 44.6 kg/m2     SpO2 Readings from Last 6 Encounters:   06/15/18 96%   05/15/18 91%   04/03/18 96%   03/20/18 98%   01/24/18 98%   01/10/18 95%    O2 Flow Rate (L/min): 2 l/min       Intake/Output Summary (Last 24 hours) at 06/15/18 0814  Last data filed at 06/15/18 1405   Gross per 24 hour   Intake              750 ml   Output             1000 ml   Net             -250 ml        Physical Exam:    Gen:  obese, in no acute distress  HEENT:  Pink conjunctivae, PERRL, hearing intact to voice, moist mucous membranes  Neck:  Supple, without masses, thyroid non-tender  Resp:  No accessory muscle use, clear breath sounds without wheezes rales or rhonchi  Card:  No murmurs, normal S1, S2 without thrills, bruits or peripheral edema  Abd:  Soft, non-tender, non-distended, normoactive bowel sounds are present, no palpable organomegaly and no detectable hernias  Lymph:  No cervical or inguinal adenopathy  Musc:  No cyanosis or clubbing  Skin:  No rashes or ulcers, skin turgor is good  Neuro:  Cranial nerves are grossly intact, no focal motor weakness, follows commands appropriately  Psych:  Good insight, oriented to person, place and time, alert  __________________________________________________________________  Medications Reviewed: (see below)  Medications:     Current Facility-Administered Medications   Medication Dose Route Frequency    bumetanide (BUMEX) tablet 2 mg  2 mg Oral DAILY    epoetin charlie (EPOGEN;PROCRIT) injection 15,000 Units  15,000 Units IntraVENous DIALYSIS MON, WED & FRI    heparin (porcine) 1,000 unit/mL injection 5,000 Units  5,000 Units InterCATHeter Multiple    lidocaine-prilocaine (EMLA) 2.5-2.5 % cream   Topical PRN    cloNIDine HCl (CATAPRES) tablet 0.1 mg  0.1 mg Oral BID    insulin NPH (NOVOLIN N, HUMULIN N) injection 15 Units  15 Units SubCUTAneous Q12H    prochlorperazine (COMPAZINE) injection 5 mg  5 mg IntraVENous Q8H PRN    albuterol-ipratropium (DUO-NEB) 2.5 MG-0.5 MG/3 ML  3 mL Nebulization Q8H RT    labetalol (NORMODYNE;TRANDATE) injection 20 mg  20 mg IntraVENous Q4H PRN    hydrALAZINE (APRESOLINE) tablet 100 mg  100 mg Oral TID    sodium chloride (OCEAN) 0.65 % nasal squeeze bottle 2 Spray  2 Spray Both Nostrils Q2H PRN    HYDROmorphone (DILAUDID) syringe 0.5 mg  0.5 mg IntraVENous Q4H PRN    metOLazone (ZAROXOLYN) tablet 5 mg  5 mg Oral DAILY    ondansetron (ZOFRAN ODT) tablet 8 mg  8 mg Oral Q8H PRN    magnesium oxide (MAG-OX) tablet 400 mg  400 mg Oral BID    sodium chloride (NS) flush 5-10 mL  5-10 mL IntraVENous Q8H    sodium chloride (NS) flush 5-10 mL  5-10 mL IntraVENous PRN    0.9% sodium chloride infusion 250 mL  250 mL IntraVENous PRN    nitroglycerin (NITROSTAT) tablet 0.4 mg  0.4 mg SubLINGual PRN    oxyCODONE-acetaminophen (PERCOCET 7.5) 7.5-325 mg per tablet 1 Tab  1 Tab Oral Q6H PRN    Warfarin: pharmacy to dose    Other Rx Dosing/Monitoring    amLODIPine (NORVASC) tablet 10 mg  10 mg Oral DAILY    albuterol (PROVENTIL VENTOLIN) nebulizer solution 2.5 mg  2.5 mg Nebulization Q6H PRN    allopurinol (ZYLOPRIM) tablet 100 mg  100 mg Oral DAILY    aspirin delayed-release tablet 81 mg  81 mg Oral DAILY    atorvastatin (LIPITOR) tablet 80 mg  80 mg Oral QHS    calcitRIOL (ROCALTROL) capsule 0.25 mcg  0.25 mcg Oral Q M, W, F & SAT    carvedilol (COREG) tablet 25 mg  25 mg Oral BID WITH MEALS    ergocalciferol (ERGOCALCIFEROL) capsule 50,000 Units  50,000 Units Oral every Tuesday    fluticasone (FLONASE) 50 mcg/actuation nasal spray 2 Spray  2 Spray Both Nostrils DAILY PRN    hydrOXYzine HCl (ATARAX) tablet 25 mg  25 mg Oral TID PRN    isosorbide mononitrate ER (IMDUR) tablet 120 mg  120 mg Oral DAILY    pantoprazole (PROTONIX) tablet 40 mg  40 mg Oral ACB    polyethylene glycol (MIRALAX) packet 17 g  17 g Oral DAILY    senna-docusate (PERICOLACE) 8.6-50 mg per tablet 1 Tab  1 Tab Oral DAILY    sodium chloride (NS) flush 5-10 mL  5-10 mL IntraVENous Q8H    sodium chloride (NS) flush 5-10 mL  5-10 mL IntraVENous PRN    naloxone (NARCAN) injection 0.4 mg  0.4 mg IntraVENous PRN    glucose chewable tablet 16 g  4 Tab Oral PRN    dextrose (D50W) injection syrg 12.5-25 g  12.5-25 g IntraVENous PRN    glucagon (GLUCAGEN) injection 1 mg  1 mg IntraMUSCular PRN    acetaminophen (TYLENOL) tablet 650 mg  650 mg Oral Q4H PRN    diphenhydrAMINE (BENADRYL) capsule 25 mg  25 mg Oral Q4H PRN    insulin lispro (HUMALOG) injection   SubCUTAneous AC&HS        Lab Data Reviewed: (see below)  Lab Review:     Recent Labs      06/15/18   0614  06/13/18   0025   WBC  18.4*  15.5*   HGB  8.8*  9.2*   HCT  28.1*  29.1*   PLT  255  252     Recent Labs      06/15/18   0614  06/14/18   0405  06/13/18   0025   NA  133*  139  135*   K  4.4  4.0  4.3   CL  96*  100  101   CO2  29  30  30   GLU  192*  84  167*   BUN  59*  42*  67*   CREA  4.47*  3.23*  3.60*   CA  9.0  8.8  8.8   PHOS  3.5  3.4  3.7   ALB  3.2*  3.2*  3.3*   INR  2.2*  2.1*  2.2*     Lab Results   Component Value Date/Time    Glucose (POC) 185 (H) 06/15/2018 07:38 AM    Glucose (POC) 287 (H) 06/14/2018 08:35 PM    Glucose (POC) 301 (H) 06/14/2018 04:39 PM    Glucose (POC) 203 (H) 06/14/2018 11:53 AM    Glucose (POC) 95 06/14/2018 07:40 AM     No results for input(s): PH, PCO2, PO2, HCO3, FIO2 in the last 72 hours. Recent Labs      06/15/18   0614 06/14/18   0405  06/13/18   0025   INR  2.2*  2.1*  2.2*     All Micro Results     Procedure Component Value Units Date/Time    RAYSHAWN Gallardo, UR/CSF [[id number]] Collected:  06/08/18 6604    Order Status:  Completed Specimen:  Other from Miscellaneous sample Updated:  06/09/18 1236     Source URINE        Specimen Urine     Streptococcus pneumoniae Ag NEGATIVE         Fluid culture Not Indicated Organism ID Not indicated. Please note Comment         (NOTE)  College of [employer]ologists standards require a culture to be  performed on CSF specimens submitted for bacterial antigen testing. (CAP [id number]) Urine specimens will not be cultured. Performed At: [address] [id number]  Mishel Villasenor MD ZQ:[id number]         RESPIRATORY Remigio Damon [[id number]]  (Abnormal) Collected:  06/08/18 0824    Order Status:  Completed Specimen:  Nasopharyngeal Updated:  06/08/18 1247     Adenovirus NOT DETECTED        Coronavirus 229E NOT DETECTED        Coronavirus HKU1 NOT DETECTED        Coronavirus CVNL63 NOT DETECTED        Coronavirus OC43 NOT DETECTED        Metapneumovirus NOT DETECTED        Rhinovirus and Enterovirus NOT DETECTED        Influenza A NOT DETECTED        Influenza A, subtype H1 NOT DETECTED        Influenza A, subtype H3 NOT DETECTED        INFLUENZA A H1N1 PCR NOT DETECTED        Influenza B NOT DETECTED        Parainfluenza 1 NOT DETECTED        Parainfluenza 2 NOT DETECTED        Parainfluenza 3 DETECTED (A)        Parainfluenza virus 4 NOT DETECTED        RSV by PCR NOT DETECTED        Bordetella pertussis - PCR NOT DETECTED        Chlamydophila pneumoniae DNA, QL, PCR NOT DETECTED        Mycoplasma pneumoniae DNA, QL, PCR NOT DETECTED       URINE CULTURE HOLD SAMPLE [[id number]] Collected:  06/08/18 0826    Order Status:  Completed Specimen:  Serum Updated:  06/08/18 0835     Urine culture hold         URINE ON HOLD IN MICROBIOLOGY DEPT FOR 3 DAYS. IF UNPRESERVED URINE IS SUBMITTED, IT CANNOT BE USED FOR ADDITIONAL TESTING AFTER 24 HRS, RECOLLECTION WILL BE REQUIRED. I have reviewed notes of prior 24hr. Other pertinent lab:       Total time spent with patient: [address] discussed with: Patient, Family, Nursing Staff and >50% of time spent in counseling and coordination of care    Discussed:  Care Plan    Prophylaxis: warfarin     Disposition:  Home w/Family           ___________________________________________________    Attending Physician: Evelyn Pyle MD

## 2018-06-15 NOTE — PROGRESS NOTES
[facility] follow-up appointment on Tuesda June 26,2018 @ 1:15 pm. with Dr. Dafne Florez. Specialist appointment on Tuesday June 19,2018 @ 3:00 p.m., with Dr. Mariama Ribeiro consulted.     Added to AVS.  Dimitrios Saldivar CM Specialist

## 2018-06-15 NOTE — PROGRESS NOTES
Physical therapy    1000 chart reviewed,spoke with RN. Pt currently receiving hemodialysis, anticipating discharge later today. Will defer PT at this time and follow up if patient not discharged. Derral Grief  Ed Thakur

## 2018-06-15 NOTE — PROGRESS NOTES
[address] [facility] [place]  YOB: 1955          Assessment & Plan:   CKD 4  · ESRD now,MWF here  · HD started 6 11 18  · HD today  · Pt refuses AVF Stick  · Await Permcath, warfarin ON HOLD, ok by cards: dw cards again, Permcath planned Monday  · Hep panel  · She will go to [employer] failure  · Cont loops  · off Metolazone  · Low salt diet dw pt  · UF with HD  CP  · On oxygen  · Cath 6 5 18     HTN   · 131/80 this am.Amlodipine,Corge,Loops,Hydralazine    SHPT   · Calcitriol    Anemia of CKD   ·   Epo with HD  · S/p BMB  : normal       Subjective:   CC: f/u CKD  HPI:   CKD:ne hd start  AVF : Difficult stick,got temp cath, getting HD,Feels better  She refuses avf stick  BP better  Eager to go home  On Calcitriol for 2 PTH  ROS: no n/v/ neg for 10 sys except in HPI  Current Facility-Administered Medications   Medication Dose Route Frequency    cloNIDine HCl (CATAPRES) tablet 0.2 mg  0.2 mg Oral BID    hydrALAZINE (APRESOLINE) tablet 50 mg  50 mg Oral TID    bumetanide (BUMEX) tablet 2 mg  2 mg Oral DAILY    epoetin charlie (EPOGEN;PROCRIT) injection 15,000 Units  15,000 Units IntraVENous DIALYSIS MON, WED & FRI    heparin (porcine) 1,000 unit/mL injection 5,000 Units  5,000 Units InterCATHeter Multiple    lidocaine-prilocaine (EMLA) 2.5-2.5 % cream   Topical PRN    insulin NPH (NOVOLIN N, HUMULIN N) injection 15 Units  15 Units SubCUTAneous Q12H    prochlorperazine (COMPAZINE) injection 5 mg  5 mg IntraVENous Q8H PRN    albuterol-ipratropium (DUO-NEB) 2.5 MG-0.5 MG/3 ML  3 mL Nebulization Q8H RT    labetalol (NORMODYNE;TRANDATE) injection 20 mg  20 mg IntraVENous Q4H PRN    sodium chloride (OCEAN) 0.65 % nasal squeeze bottle 2 Spray  2 Spray Both Nostrils Q2H PRN    HYDROmorphone (DILAUDID) syringe 0.5 mg  0.5 mg IntraVENous Q4H PRN    metOLazone (ZAROXOLYN) tablet 5 mg  5 mg Oral DAILY    ondansetron (ZOFRAN ODT) tablet 8 mg  8 mg Oral Q8H PRN    magnesium oxide (MAG-OX) tablet 400 mg  400 mg Oral BID    sodium chloride (NS) flush 5-10 mL  5-10 mL IntraVENous Q8H    sodium chloride (NS) flush 5-10 mL  5-10 mL IntraVENous PRN    0.9% sodium chloride infusion 250 mL  250 mL IntraVENous PRN    nitroglycerin (NITROSTAT) tablet 0.4 mg  0.4 mg SubLINGual PRN    oxyCODONE-acetaminophen (PERCOCET 7.5) 7.5-325 mg per tablet 1 Tab  1 Tab Oral Q6H PRN    Warfarin: pharmacy to dose    Other Rx Dosing/Monitoring    amLODIPine (NORVASC) tablet 10 mg  10 mg Oral DAILY    albuterol (PROVENTIL VENTOLIN) nebulizer solution 2.5 mg  2.5 mg Nebulization Q6H PRN    allopurinol (ZYLOPRIM) tablet 100 mg  100 mg Oral DAILY    aspirin delayed-release tablet 81 mg  81 mg Oral DAILY    atorvastatin (LIPITOR) tablet 80 mg  80 mg Oral QHS    calcitRIOL (ROCALTROL) capsule 0.25 mcg  0.25 mcg Oral Q M, W, F & SAT    carvedilol (COREG) tablet 25 mg  25 mg Oral BID WITH MEALS    ergocalciferol (ERGOCALCIFEROL) capsule 50,000 Units  50,000 Units Oral every Tuesday    fluticasone (FLONASE) 50 mcg/actuation nasal spray 2 Spray  2 Spray Both Nostrils DAILY PRN    hydrOXYzine HCl (ATARAX) tablet 25 mg  25 mg Oral TID PRN    isosorbide mononitrate ER (IMDUR) tablet 120 mg  120 mg Oral DAILY    pantoprazole (PROTONIX) tablet 40 mg  40 mg Oral ACB    polyethylene glycol (MIRALAX) packet 17 g  17 g Oral DAILY    senna-docusate (PERICOLACE) 8.6-50 mg per tablet 1 Tab  1 Tab Oral DAILY    sodium chloride (NS) flush 5-10 mL  5-10 mL IntraVENous Q8H    sodium chloride (NS) flush 5-10 mL  5-10 mL IntraVENous PRN    naloxone (NARCAN) injection 0.4 mg  0.4 mg IntraVENous PRN    glucose chewable tablet 16 g  4 Tab Oral PRN    dextrose (D50W) injection syrg 12.5-25 g  12.5-25 g IntraVENous PRN    glucagon (GLUCAGEN) injection 1 mg  1 mg IntraMUSCular PRN    acetaminophen (TYLENOL) tablet 650 mg  650 mg Oral Q4H PRN    diphenhydrAMINE (BENADRYL) capsule 25 mg  25 mg Oral Q4H PRN    insulin lispro (HUMALOG) injection   SubCUTAneous AC&HS          Objective:     Vitals:  Blood pressure 131/80, pulse 76, temperature 99.1 °F (37.3 °C), temperature source Oral, resp. rate 16, height 5' 10\" (1.778 m), weight 141 kg (310 lb 13.6 oz), SpO2 98 %. Temp (24hrs), Av.4 °F (36.9 °C), Min:98 °F (36.7 °C), Max:99.1 °F (37.3 °C)      Intake and Output:  06/15 07 - 06/15 1900  In: 120 [P.O.:120]  Out: -    190 - 06/15 0700  In: 1015 [P.O.:1390; I.V.:20]  Out: 1650 [Urine:1650]    Physical Exam:               GENERAL ASSESSMENT: NAD  CHEST: CTA  HEART: S1S2  ABDOMEN: Soft,NT  SKIN DRY  JT: No acute findings  EXTREMITY: trace EDEMA          Data Review      No results for input(s): TNIPOC in the last 72 hours. No lab exists for component: ITNL   No results for input(s): CPK, CKMB, TROIQ in the last 72 hours. Recent Labs      06/15/18   0614  06/14/18   0405  06/13/18   0025   NA  133*  139  135*   K  4.4  4.0  4.3   CL  96*  100  101   CO2  29  30  30   BUN  59*  42*  67*   CREA  4.47*  3.23*  3.60*   GLU  192*  84  167*   PHOS  3.5  3.4  3.7   CA  9.0  8.8  8.8   ALB  3.2*  3.2*  3.3*   WBC  18.4*   --   15.5*   HGB  8.8*   --   9.2*   HCT  28.1*   --   29.1*   PLT  255   --   252      Recent Labs      06/15/18   0614  06/14/18   0405  18   0025   INR  2.2*  2.1*  2.2*   PTP  22.0*  21.4*  22.1*     Needs: urine analysis, urine sodium, protein and creatinine  Lab Results   Component Value Date/Time    Sodium urine, random 25 11/10/2014 12:40 PM    Creatinine, urine 145.29 2017 05:01 AM         : Xochilt Mortensen MD  6/15/2018        [facility]:  [email or web address]  Omaira Casandralady office:  [place] [address] [place] [address],[address]  [address]  Phone: [phone]  Fax :     [phone]    [place] office:  [address] [place] [address]  Phone - [phone]  Fax - [phone]

## 2018-06-15 NOTE — PROGRESS NOTES
Tuesday Thursday Saturday schedule confirmed with Amanda. Pt will be going to the [facility] location chair time is 6:15. Pt aware of time and address. Information is on pt's AVS. NN notified of hospital discharge.  CHANDA Porras

## 2018-06-15 NOTE — PROGRESS NOTES
Temp HD and central line catheter removed. No obvious signs of bleeding and patient tolerated procedure well. Provided patient with verbal instructions regarding the importance of sitting upright for at least two hours after line being removed to ensure hemostasis is maintained. Dstat pad in place and no signs of bleeding    Patient advised to arrive Monday at 0930, 2nd floor for insertion of permanent dialysis catheter. Patient will NPO after midnight and have a . Patient will not take any aspirin after Saturday and verbalized understanding that no Coumadin will be taken as well.

## 2018-06-15 NOTE — PROGRESS NOTES
Dr Dread Pilon called to schedule Ms Garcia on Monday as an Out Pt for a permacath. She has a temp dialysis catheter at this time in the right IJ. Her INR is 2.0 today and we pull the temp catheter per Dr Dread Tobin today.

## 2018-06-18 ENCOUNTER — HOSPITAL ENCOUNTER (OUTPATIENT)
Dept: INTERVENTIONAL RADIOLOGY/VASCULAR | Age: 63
Discharge: HOME | End: 2018-06-18
Attending: INTERNAL MEDICINE | Admitting: RADIOLOGY
Payer: MEDICARE

## 2018-06-18 ENCOUNTER — HOME CARE VISIT (OUTPATIENT)
Dept: HOME HEALTH SERVICES | Facility: HOME HEALTH | Age: 63
End: 2018-06-18

## 2018-06-18 VITALS
WEIGHT: 293 LBS | TEMPERATURE: 98.2 F | BODY MASS INDEX: 41.95 KG/M2 | HEIGHT: 70 IN | SYSTOLIC BLOOD PRESSURE: 155 MMHG | RESPIRATION RATE: 13 BRPM | OXYGEN SATURATION: 97 % | DIASTOLIC BLOOD PRESSURE: 81 MMHG | HEART RATE: 67 BPM

## 2018-06-18 LAB
GLUCOSE BLD STRIP.AUTO-MCNC: 105 MG/DL (ref 65–100)
SERVICE CMNT-IMP: ABNORMAL

## 2018-06-18 PROCEDURE — 74011000250 HC RX REV CODE- 250: Performed by: RADIOLOGY

## 2018-06-18 PROCEDURE — C1750 CATH, HEMODIALYSIS,LONG-TERM: HCPCS

## 2018-06-18 PROCEDURE — 77030002986 HC SUT PROL J&J -A

## 2018-06-18 PROCEDURE — 99152 MOD SED SAME PHYS/QHP 5/>YRS: CPT

## 2018-06-18 PROCEDURE — 36558 INSERT TUNNELED CV CATH: CPT

## 2018-06-18 PROCEDURE — 77030029065 HC DRSG HEMO QCLOT ZMED -B

## 2018-06-18 PROCEDURE — 77030039266 HC ADH SKN EXOFIN S2SG -A

## 2018-06-18 PROCEDURE — C1894 INTRO/SHEATH, NON-LASER: HCPCS

## 2018-06-18 PROCEDURE — 77030031139 HC SUT VCRL2 J&J -A

## 2018-06-18 PROCEDURE — 77030018719 HC DRSG PTCH ANTIMIC J&J -A

## 2018-06-18 PROCEDURE — 82962 GLUCOSE BLOOD TEST: CPT

## 2018-06-18 PROCEDURE — 74011250636 HC RX REV CODE- 250/636: Performed by: RADIOLOGY

## 2018-06-18 RX ORDER — HEPARIN SODIUM 1000 [USP'U]/ML
3000 INJECTION, SOLUTION INTRAVENOUS; SUBCUTANEOUS
Status: DISCONTINUED | OUTPATIENT
Start: 2018-06-18 | End: 2018-06-18 | Stop reason: HOSPADM

## 2018-06-18 RX ORDER — HEPARIN SODIUM 200 [USP'U]/100ML
500 INJECTION, SOLUTION INTRAVENOUS ONCE
Status: COMPLETED | OUTPATIENT
Start: 2018-06-18 | End: 2018-06-18

## 2018-06-18 RX ORDER — LIDOCAINE HYDROCHLORIDE 10 MG/ML
10-30 INJECTION INFILTRATION; PERINEURAL
Status: DISCONTINUED | OUTPATIENT
Start: 2018-06-18 | End: 2018-06-18 | Stop reason: HOSPADM

## 2018-06-18 RX ORDER — MIDAZOLAM HYDROCHLORIDE 1 MG/ML
.5-1 INJECTION, SOLUTION INTRAMUSCULAR; INTRAVENOUS
Status: DISCONTINUED | OUTPATIENT
Start: 2018-06-18 | End: 2018-06-18 | Stop reason: HOSPADM

## 2018-06-18 RX ORDER — FENTANYL CITRATE 50 UG/ML
25-200 INJECTION, SOLUTION INTRAMUSCULAR; INTRAVENOUS
Status: DISCONTINUED | OUTPATIENT
Start: 2018-06-18 | End: 2018-06-18 | Stop reason: HOSPADM

## 2018-06-18 RX ADMIN — MIDAZOLAM 2 MG: 1 INJECTION INTRAMUSCULAR; INTRAVENOUS at 12:17

## 2018-06-18 RX ADMIN — LIDOCAINE HYDROCHLORIDE 10 ML: 10 INJECTION, SOLUTION INFILTRATION; PERINEURAL at 12:18

## 2018-06-18 RX ADMIN — FENTANYL CITRATE 25 MCG: 50 INJECTION, SOLUTION INTRAMUSCULAR; INTRAVENOUS at 12:38

## 2018-06-18 RX ADMIN — CEFAZOLIN 3 G: 1 INJECTION, POWDER, FOR SOLUTION INTRAMUSCULAR; INTRAVENOUS; PARENTERAL at 12:17

## 2018-06-18 RX ADMIN — FENTANYL CITRATE 50 MCG: 50 INJECTION, SOLUTION INTRAMUSCULAR; INTRAVENOUS at 12:17

## 2018-06-18 RX ADMIN — HEPARIN SODIUM 1000 UNITS: 200 INJECTION, SOLUTION INTRAVENOUS at 12:05

## 2018-06-18 RX ADMIN — HEPARIN SODIUM 3800 UNITS: 1000 INJECTION, SOLUTION INTRAVENOUS; SUBCUTANEOUS at 12:28

## 2018-06-18 NOTE — ROUTINE PROCESS
TRANSFER - OUT REPORT:    Verbal report given to DAYBREAK OF CRISTINA GUZMAN(name) on Paula Manning  being transferred to [facility](unit) for routine post - op       Report consisted of patients Situation, Background, Assessment and   Recommendations(SBAR). Information from the following report(s) SBAR was reviewed with the receiving nurse. Lines:   Peripheral IV 06/18/18 Right Antecubital (Active)   Site Assessment Clean, dry, & intact 6/18/2018 10:10 AM        Opportunity for questions and clarification was provided.       Patient transported with:   Registered Nurse

## 2018-06-18 NOTE — DISCHARGE INSTRUCTIONS
Where can you learn more? Go to [employer]  Enter M256 in the search box to learn more about \"[employer]: What to Expect at Home. \"   © 1968-7303 [employer], [employer]. Care instructions adapted under license by [facility] (which disclaims liability or warranty for this information). This care instruction is for use with your licensed healthcare professional. If you have questions about a medical condition or this instruction, always ask your healthcare professional. [address] any warranty or liability for your use of this information.   Content Version: 41.2.716813; Current as of: June 4, 2014

## 2018-06-18 NOTE — IP AVS SNAPSHOT
[address] 
[phone] Patient: Jj Sweeney MRN: [id number] TXO:69/11/3161 A check marcelo indicates which time of day the medication should be taken. My Medications ASK your doctor about these medications Instructions Each Dose to Equal  
 Morning Noon Evening Bedtime * PROAIR HFA 90 mcg/actuation inhaler Generic drug:  albuterol Your last dose was: Your next dose is: Take 2 Puffs by inhalation every four (4) hours as needed for Wheezing. 2 Puff * albuterol 2.5 mg /3 mL (0.083 %) nebulizer solution Commonly known as:  PROVENTIL VENTOLIN Your last dose was: Your next dose is:    
   
   
 2.5 mg by Nebulization route every four (4) hours as needed. 2.5 mg  
    
   
   
   
  
 allopurinol 100 mg tablet Commonly known as:  Catrachita Mole Your last dose was: Your next dose is: Take 100 mg by mouth daily. 100 mg  
    
   
   
   
  
 amLODIPine 10 mg tablet Commonly known as:  Angel Hinds Your last dose was: Your next dose is: Take 10 mg by mouth daily. 10 mg  
    
   
   
   
  
 aspirin 81 mg tablet Your last dose was: Your next dose is: Take 81 mg by mouth daily. 81 mg  
    
   
   
   
  
 atorvastatin 80 mg tablet Commonly known as:  LIPITOR Your last dose was: Your next dose is: Take 80 mg by mouth nightly. 80 mg  
    
   
   
   
  
 bumetanide 1 mg tablet Commonly known as:  Em [place] Your last dose was: Your next dose is: Take 2 Tabs by mouth two (2) times a day. 2 mg  
    
   
   
   
  
 calcitRIOL 0.25 mcg capsule Commonly known as:  ROCALTROL Your last dose was: Your next dose is: Take 0.25 mcg by mouth every Monday, Wednesday, Friday, Saturday. 0.25 mcg  
    
   
   
   
  
 carvedilol 25 mg tablet Commonly known as:  Kaya Finder Your last dose was: Your next dose is: TAKE 1 TABLET BY MOUTH TWICE A DAY FLONASE 50 mcg/actuation nasal spray Generic drug:  fluticasone Your last dose was: Your next dose is: 2 Sprays by Both Nostrils route nightly as needed. 2 Spray  
    
   
   
   
  
 hydrALAZINE 50 mg tablet Commonly known as:  APRESOLINE Your last dose was: Your next dose is: Take 1 Tab by mouth three (3) times daily. 50 mg  
    
   
   
   
  
 isosorbide mononitrate  mg CR tablet Commonly known as:  IMDUR Your last dose was: Your next dose is: TAKE 1 TABLET BY MOUTH EVERY DAY  
     
   
   
   
  
 metOLazone 5 mg tablet Commonly known as:  Maliha Rhonda Your last dose was: Your next dose is: Take 1 Tab by mouth daily. 5 mg  
    
   
   
   
  
 nitroglycerin 0.3 mg SL tablet Commonly known as:  NITROSTAT Your last dose was: Your next dose is:    
   
   
 1 Tab by SubLINGual route every five (5) minutes as needed for Chest Pain. 0.4 mg NovoLOG Mix 70-30 U-100 Insuln 100 unit/mL (70-30) injection Generic drug:  insulin aspart protamine/insulin aspart Your last dose was: Your next dose is:    
   
   
 by SubCUTAneous route three (3) times daily as needed (for BG  > 140 mg/dL). Uses sliding scale  
     
   
   
   
  
 pantoprazole 40 mg tablet Commonly known as:  PROTONIX Your last dose was: Your next dose is: TAKE 1 TABLET BY MOUTH EVERY DAY FOR HEARTBURN  
     
   
   
   
  
 PERCOCET 7.5-325 mg per tablet Generic drug:  oxyCODONE-acetaminophen Your last dose was: Your next dose is: Take 1 Tab by mouth every four (4) hours as needed for Pain. 1 Tab VITAMIN D2 50,000 unit capsule Generic drug:  ergocalciferol Your last dose was: Your next dose is: Take 50,000 Units by mouth every Tuesday. [address]379 Units * Notice: This list has 2 medication(s) that are the same as other medications prescribed for you. Read the directions carefully, and ask your doctor or other care provider to review them with you.

## 2018-06-18 NOTE — IP AVS SNAPSHOT
[address] 
[phone] Patient: Mervat Lindsay MRN: [id number] OEF:[id number] About your hospitalization You were admitted on:  June 18, 2018 You last received care in the:  [facility] RAD ANGIO IR You were discharged on:  June 18, 2018 Why you were hospitalized Your primary diagnosis was:  Not on File Follow-up Information None Your Scheduled Appointments Tuesday June 19, 2018  2:20 PM EDT  
ESTABLISHED PATIENT with Caitlyn Cuevas MD  
[facility] ([facility]) [address] [facility] [address] [address] [facility]  
[phone] Tuesday June 26, 2018  1:15 PM EDT TRANSITIONAL CARE MANAGEMENT with Sha Levine DO [address] [facility] ([facility]) [address] [address] [place] [address]  
[phone] Discharge Orders None A check marcelo indicates which time of day the medication should be taken. My Medications ASK your doctor about these medications Instructions Each Dose to Equal  
 Morning Noon Evening Bedtime * PROAIR HFA 90 mcg/actuation inhaler Generic drug:  albuterol Your last dose was: Your next dose is: Take 2 Puffs by inhalation every four (4) hours as needed for Wheezing. 2 Puff * albuterol 2.5 mg /3 mL (0.083 %) nebulizer solution Commonly known as:  PROVENTIL VENTOLIN Your last dose was: Your next dose is:    
   
   
 2.5 mg by Nebulization route every four (4) hours as needed. 2.5 mg  
    
   
   
   
  
 allopurinol 100 mg tablet Commonly known as:  Alena French Your last dose was: Your next dose is: Take 100 mg by mouth daily. 100 mg  
    
   
   
   
  
 amLODIPine 10 mg tablet Commonly known as:  [place] Kenner Your last dose was: Your next dose is: Take 10 mg by mouth daily. 10 mg  
    
   
   
   
  
 aspirin 81 mg tablet Your last dose was: Your next dose is: Take 81 mg by mouth daily. 81 mg  
    
   
   
   
  
 atorvastatin 80 mg tablet Commonly known as:  LIPITOR Your last dose was: Your next dose is: Take 80 mg by mouth nightly. 80 mg  
    
   
   
   
  
 bumetanide 1 mg tablet Commonly known as:  Pola Echevarria Your last dose was: Your next dose is: Take 2 Tabs by mouth two (2) times a day. 2 mg  
    
   
   
   
  
 calcitRIOL 0.25 mcg capsule Commonly known as:  ROCALTROL Your last dose was: Your next dose is: Take 0.25 mcg by mouth every Monday, Wednesday, Friday, Saturday. 0.25 mcg  
    
   
   
   
  
 carvedilol 25 mg tablet Commonly known as:  Carrolyn Peabody Your last dose was: Your next dose is: TAKE 1 TABLET BY MOUTH TWICE A DAY FLONASE 50 mcg/actuation nasal spray Generic drug:  fluticasone Your last dose was: Your next dose is: 2 Sprays by Both Nostrils route nightly as needed. 2 Spray  
    
   
   
   
  
 hydrALAZINE 50 mg tablet Commonly known as:  APRESOLINE Your last dose was: Your next dose is: Take 1 Tab by mouth three (3) times daily. 50 mg  
    
   
   
   
  
 isosorbide mononitrate  mg CR tablet Commonly known as:  IMDUR Your last dose was: Your next dose is: TAKE 1 TABLET BY MOUTH EVERY DAY  
     
   
   
   
  
 metOLazone 5 mg tablet Commonly known as:  Everardo Miller Your last dose was: Your next dose is: Take 1 Tab by mouth daily. 5 mg  
    
   
   
   
  
 nitroglycerin 0.3 mg SL tablet Commonly known as:  NITROSTAT Your last dose was: Your next dose is: 1 Tab by SubLINGual route every five (5) minutes as needed for Chest Pain. 0.4 mg NovoLOG Mix 70-30 U-100 Insuln 100 unit/mL (70-30) injection Generic drug:  insulin aspart protamine/insulin aspart Your last dose was: Your next dose is:    
   
   
 by SubCUTAneous route three (3) times daily as needed (for BG  > 140 mg/dL). Uses sliding scale  
     
   
   
   
  
 pantoprazole 40 mg tablet Commonly known as:  PROTONIX Your last dose was: Your next dose is: TAKE 1 TABLET BY MOUTH EVERY DAY FOR HEARTBURN  
     
   
   
   
  
 PERCOCET 7.5-325 mg per tablet Generic drug:  oxyCODONE-acetaminophen Your last dose was: Your next dose is: Take 1 Tab by mouth every four (4) hours as needed for Pain. 1 Tab VITAMIN D2 50,000 unit capsule Generic drug:  ergocalciferol Your last dose was: Your next dose is: Take 50,000 Units by mouth every Tuesday. 36000 Units * Notice: This list has 2 medication(s) that are the same as other medications prescribed for you. Read the directions carefully, and ask your doctor or other care provider to review them with you. Opioid Education Prescription Opioids: What You Need to Know: 
 
Prescription opioids can be used to help relieve moderate-to-severe pain and are often prescribed following a surgery or injury, or for certain health conditions. These medications can be an important part of treatment but also come with serious risks. Opioids are strong pain medicines. Examples include hydrocodone, oxycodone, fentanyl, and morphine. Heroin is an example of an illegal opioid. It is important to work with your health care provider to make sure you are getting the safest, most effective care. WHAT ARE THE RISKS AND SIDE EFFECTS OF OPIOID USE? Prescription opioids carry serious risks of addiction and overdose, especially with prolonged use. An opioid overdose, often marked by slow breathing, can cause sudden death. The use of prescription opioids can have a number of side effects as well, even when taken as directed. · Tolerance-meaning you might need to take more of a medication for the same pain relief · Physical dependence-meaning you have symptoms of withdrawal when the medication is stopped. Withdrawal symptoms can include nausea, sweating, chills, diarrhea, stomach cramps, and muscle aches. Withdrawal can last up to several weeks, depending on which drug you took and how long you took it. · Increased sensitivity to pain · Constipation · Nausea, vomiting, and dry mouth · Sleepiness and dizziness · Confusion · Depression · Low levels of testosterone that can result in lower sex drive, energy, and strength · Itching and sweating RISKS ARE GREATER WITH:      
· History of drug misuse, substance use disorder, or overdose · Mental health conditions (such as depression or anxiety) · Sleep apnea · Older age (72 years or older) · Pregnancy Avoid alcohol while taking prescription opioids. Also, unless specifically advised by your health care provider, medications to avoid include: · Benzodiazepines (such as Xanax or Valium) · Muscle relaxants (such as Soma or Flexeril) · Hypnotics (such as Ambien or Lunesta) · Other prescription opioids KNOW YOUR OPTIONS Talk to your health care provider about ways to manage your pain that don't involve prescription opioids. Some of these options may actually work better and have fewer risks and side effects. Options may include: 
· Pain relievers such as acetaminophen, ibuprofen, and naproxen · Some medications that are also used for depression or seizures · Physical therapy and exercise · Counseling to help patients learn how to cope better with triggers of pain and stress. · Application of heat or cold compress · Massage therapy · Relaxation techniques Be Informed Make sure you know the name of your medication, how much and how often to take it, and its potential risks & side effects. IF YOU ARE PRESCRIBED OPIOIDS FOR PAIN: 
· Never take opioids in greater amounts or more often than prescribed. Remember the goal is not to be pain-free but to manage your pain at a tolerable level. · Follow up with your primary care provider to: · Work together to create a plan on how to manage your pain. · Talk about ways to help manage your pain that don't involve prescription opioids. · Talk about any and all concerns and side effects. · Help prevent misuse and abuse. · Never sell or share prescription opioids · Help prevent misuse and abuse. · Store prescription opioids in a secure place and out of reach of others (this may include visitors, children, friends, and family). · Safely dispose of unused/unwanted prescription opioids: Find your community drug take-back program or your pharmacy mail-back program, or flush them down the toilet, following guidance from the Food and Drug Administration (www.fda.gov/Drugs/ResourcesForYou). · Visit www.cdc.gov/drugoverdose to learn about the risks of opioid abuse and overdose. · If you believe you may be struggling with addiction, tell your health care provider and ask for guidance or call [address] [place] [address] at [phone]. Discharge Instructions Where can you learn more? Go to Revnetics.be Enter M256 in the search box to learn more about \"Implanted Port: What to Expect at Home. \"  
© 7080-0237 Healthwise, Incorporated. Care instructions adapted under license by [facility] (which disclaims liability or warranty for this information).  This care instruction is for use with your licensed healthcare professional. If you have questions about a medical condition or this instruction, always ask your healthcare professional. [address] any warranty or liability for your use of this information. Content Version: 27.4.051670; Current as of: June 4, 2014 ACO Transitions of Care Introducing [employer] 508 Katy Nair offers a voluntary care coordination program to provide high quality service and care to [facility] fee-for-service beneficiaries. Tricia Ureña was designed to help you enhance your health and well-being through the following services: ? Transitions of Care  support for individuals who are transitioning from one care setting to another (example: Hospital to home). ? Chronic and Complex Care Coordination  support for individuals and caregivers of those with serious or chronic illnesses or with more than one chronic (ongoing) condition and those who take a number of different medications. If you meet specific medical criteria, a [facility] [address] may call you directly to coordinate your care with your primary care physician and your other care providers. For questions about the [facility] programs, please, contact your physicians office. For general questions or additional information about Accountable Care Organizations: 
Please visit www.medicare.gov/acos. html or call 1-800-MEDICARE (7-638.794.8853) TTY users should call 6-338.875.3337. Introducing [facility]! Dear Laura Andrade: Thank you for requesting a Aveillant account. Our records indicate that you already have an active Aveillant account. You can access your account anytime at [email or web address] Did you know that you can access your hospital and ER discharge instructions at any time in Aveillant?   You can also review all of your test results from your hospital stay or ER visit. Additional Information If you have questions, please visit the Frequently Asked Questions section of the MyChart website at [email or web address] Remember, Naytevt is NOT to be used for urgent needs. For medical emergencies, dial 911. Now available from your iPhone and Android! Introducing Baltazar Alvarez As a [facility], I wanted to make you aware of our electronic visit tool called Baltazar Alvarez. Kingdee 24/AVOB allows you to connect within minutes with a medical provider 24 hours a day, seven days a week via a mobile device or tablet or logging into a secure website from your computer. You can access Baltazar Alvarez from anywhere in the [place]. A virtual visit might be right for you when you have a simple condition and feel like you just dont want to get out of bed, or cant get away from work for an appointment, when your regular [facility] provider is not available (evenings, weekends or holidays), or when youre out of town and need minor care. Electronic visits cost only $49 and if the Nourish/AVOB provider determines a prescription is needed to treat your condition, one can be electronically transmitted to a nearby pharmacy*. Please take a moment to enroll today if you have not already done so. The enrollment process is free and takes just a few minutes. To enroll, please download the Nourish/AVOB mitch to your tablet or phone, or visit [email or web address] to enroll on your computer. And, as an [address] [place] [address] patient with a Saber Hacer account, the results of your visits will be scanned into your electronic medical record and your primary care provider will be able to view the scanned results. We urge you to continue to see your regular [facility] provider for your ongoing medical care.   And while your primary care provider may not be the one available when you seek a Flintelenafin virtual visit, the peace of mind you get from getting a real diagnosis real time can be priceless. For more information on Innovate/Protect, view our Frequently Asked Questions (FAQs) at [email or web address]. Sincerely, 
 
Arianna Whitfield MD 
Chief Medical Officer Claude Nair *:  certain medications cannot be prescribed via Flintelenafin Providers Seen During Your Hospitalization Provider Specialty Primary office phone Radha Majano MD Nephrology [phone] Your Primary Care Physician (PCP) Primary Care Physician Office Phone Office Fax Cece Garland [phone] You are allergic to the following Allergen Reactions Contrast Dye (Iodine) Anaphylaxis Tolerates when pre medicated w/ IV solumedrol and benadryl prior to procedure Levaquin (Levofloxacin) Nausea and Vomiting Morphine Hives Itching Recent Documentation Height Weight BMI OB Status Smoking Status 1.778 m 141.5 kg 44.76 kg/m2 Hysterectomy Former Smoker Emergency Contacts Name Discharge Info Relation Home Work [address] CAREGIVER [3] Spouse [3] [phone] Patient Belongings The following personal items are in your possession at time of discharge: 
     Visual Aid: Glasses, None Discharge Instructions Attachments/References HEMODIALYSIS ACCESS : POST-OP (ENGLISH) Patient Handouts Hemodialysis Access: What to Expect at [facility] ER Your Recovery Hemodialysis is a way to remove wastes from the blood when your kidneys can no longer do the job. It is not a cure, but it can help you live longer and feel better. It is a lifesaving treatment when you have kidney failure. Hemodialysis is often called dialysis.  Your doctor created a place (called an access) in your arm for your blood to flow in and out of your body during your dialysis sessions. Your arm will probably be bruised and swollen. It may hurt. The cut (incision) may bleed. The pain and bleeding will get better over several days. You will probably need only over-the-counter pain medicine. You can reduce swelling by propping your arm on 1 or 2 pillows and keeping your elbow straight. You will have stitches. These may dissolve on their own, or your doctor will tell you when to come in to have them removed. You should also be able to return to work in a few days. You may feel some coolness or numbness in your hand. These feelings usually go away in a few weeks. Your doctor may suggest squeezing a soft object. This will strengthen your access and may make hemodialysis faster and easier. You should always be able to feel blood rushing through the fistula or graft. It feels like a slight vibration when you put your fingers on the skin over the fistula or graft. This feeling is called a thrill or pulse. This care sheet gives you a general idea about how long it will take for you to recover. But each person recovers at a different pace. Follow the steps below to get better as quickly as possible. How can you care for yourself at home? Activity ? · Rest when you feel tired. Getting enough sleep will help you recover. Do not lie on or sleep on the arm with the access. ? · Avoid activities such as washing windows or gardening that put stress on the arm with the access. ? · You may use your arm, but do not lift anything that weighs more than about 15 pounds. This may include a child, heavy grocery bags, a heavy briefcase or backpack, cat litter or dog food bags, or a vacuum . ? · You can shower, but keep the access dry for the first 2 days. Cover the area with a plastic bag to keep it dry. ? · Do not soak or scrub the incision until it has healed. ? · Wear an arm guard to protect the area if you play sports or work with your arms. ? · You may drive when your doctor says it is okay. This is usually in 1 to 2 days. ? · Most people are able to return to work about 1 or 2 days after surgery. Diet ? · Follow an eating plan that is good for your kidneys. A registered dietitian can help you make a meal plan that is right for you. You may need to limit protein, salt, fluids, and certain foods. Medicines ? · Your doctor will tell you if and when you can restart your medicines. He or she will also give you instructions about taking any new medicines. ? · If you take blood thinners, such as warfarin (Coumadin), clopidogrel (Plavix), or aspirin, be sure to talk to your doctor. He or she will tell you if and when to start taking those medicines again. Make sure that you understand exactly what your doctor wants you to do. ? · Take pain medicines exactly as directed. ¨ If the doctor gave you a prescription medicine for pain, take it as prescribed. ¨ If you are not taking a prescription pain medicine, ask your doctor if you can take acetaminophen (Tylenol). Do not take ibuprofen (Advil, Motrin) or naproxen (Aleve), or similar medicines, unless your doctor tells you to. They may make chronic kidney disease worse. ¨ Do not take two or more pain medicines at the same time unless the doctor told you to. Many pain medicines have acetaminophen, which is Tylenol. Too much acetaminophen (Tylenol) can be harmful. ? · If you think your pain medicine is making you sick to your stomach: 
¨ Take your medicine after meals (unless your doctor has told you not to). ¨ Ask your doctor for a different pain medicine. ? · If your doctor prescribed antibiotics, take them as directed. Do not stop taking them just because you feel better. You need to take the full course of antibiotics. Incision care ? · Keep the area dry for 2 days.  After 2 days, wash the area with soap and water every day, and always before dialysis. ? · Do not soak or scrub the incision until it has healed. ? · If you have a bandage, change it every day or as your doctor recommends. Your doctor will tell you when you can remove it. Exercise ? · Squeeze a soft ball or other object as your doctor tells you. This will help blood flow through the access and help prevent blood clots. ? Elevation ? · Prop up the sore arm on a pillow anytime you sit or lie down during the next 3 days. Try to keep it above the level of your heart. This will help reduce swelling. Other instructions ? · Every day, check your access for a pulse or thrill in the fistula or graft area. A thrill is a vibration. To feel a pulse or thrill, place the first two fingers of your hand over the access. ? · Do not bump your arm. ? · Do not wear tight clothing, jewelry, or anything else that may squeeze the access. ? · Use your other arm to have blood drawn or blood pressure taken. ? · Do not put cream or lotion on or near the access. ? · Make sure all doctors you deal with know you have a vascular access. Follow-up care is a key part of your treatment and safety. Be sure to make and go to all appointments, and call your doctor if you are having problems. It's also a good idea to know your test results and keep a list of the medicines you take. When should you call for help? Call 911 anytime you think you may need emergency care. For example, call if: 
? · You passed out (lost consciousness). ? · You have chest pain, are short of breath, or cough up blood. ?Call your doctor now or seek immediate medical care if: 
? · Your hand or arm is cold or dark-colored. ? · You have no pulse in your access. ? · You have nausea or you vomit. ? · You have pain that does not get better after you take pain medicine. ? · You have loose stitches, or your incision comes open. ? · You are bleeding from the incision. ? · You have signs of infection, such as: 
¨ Increased pain, swelling, warmth, or redness. ¨ Red streaks leading from the area. ¨ Pus draining from the area. ¨ A fever. ? · You have signs of a blood clot in your leg (called a deep vein thrombosis), such as: 
¨ Pain in your calf, back of the knee, thigh, or groin. ¨ Redness or swelling in your leg. ? Watch closely for changes in your health, and be sure to contact your doctor if you have any problems. Where can you learn more? Go to http://lindsay-kai.info/. Enter P616 in the search box to learn more about \"Hemodialysis Access: What to Expect at Home. \" Current as of: May 12, 2017 Content Version: 11.4 © 5302-0424 Healthwise, Resource Interactive. Care instructions adapted under license by [employer] (which disclaims liability or warranty for this information). If you have questions about a medical condition or this instruction, always ask your healthcare professional. [address] any warranty or liability for your use of this information. Please provide this summary of care documentation to your next provider. Signatures-by signing, you are acknowledging that this After Visit Summary has been reviewed with you and you have received a copy. Patient Signature:  ____________________________________________________________ Date:  ____________________________________________________________  
  
Perez Thakkar Provider Signature:  ____________________________________________________________ Date:  ____________________________________________________________

## 2018-06-18 NOTE — H&P
Interventional Radiology History and Physical (Outpatient)    6/18/2018    Patient: Volodymyr Curtis 63 y.o. female     Referring Physician:  Adrian Perez MD    Chief Complaint: need tunneled dialysis cath    History of Present Illness: ESRD on dialysis    History:  Past Medical History:   Diagnosis Date     Sleep Apnea 2/16/2011    Aortic aneurysm ([address]     CAD (coronary Artery Disease) Native Artery 2/16/2011    CKD (chronic kidney disease) stage 4, GFR 15-29 ml/min ([address] 2/10/2017    COPD (chronic obstructive pulmonary disease) ([address]     Diabetic gastroparesis (HCC)     Diastolic heart failure ([address] 10/5/2012    DM type 2 causing neurological disease ([address]     DM type 2 causing renal disease ([address]     DM type 2 causing vascular disease ([address]     Esophageal stricture 2012    dialted Dr. Brant Bhagat G6PD deficiency ([address]     trait    GERD (gastroesophageal reflux disease)     Gout     ILD (interstitial lung disease) ([address]     open lung bx CJW 2010    Morbid obesity ([address]     OA (osteoarthritis)     Ovarian cancer ([address]     cervical and uterine    Rheumatoid arteritis     S/P left pulmonary artery pressure sensor implant placement 8/31/2017    [facility]     Tobacco use disorder 3/21/2012    Uterine cervix cancer ([address]     Vitamin D deficiency 8/9/2013     Family History   Problem Relation Age of Onset    Heart Disease Mother     Heart Disease Brother      Social History     Social History    Marital status:      Spouse name: N/A    Number of children: N/A    Years of education: N/A     Occupational History    Not on file. Social History Main Topics    Smoking status: Former Smoker     Packs/day: 0.50     Years: 41.00     Types: Cigarettes     Quit date: 11/9/2014    Smokeless tobacco: Never Used    Alcohol use No    Drug use: No    Sexual activity: Not on file     Other Topics Concern    Not on file     Social History Narrative       Allergies:    Allergies Allergen Reactions    Contrast Dye [Iodine] Anaphylaxis     Tolerates when pre medicated w/ IV solumedrol and benadryl prior to procedure     Levaquin [Levofloxacin] Nausea and Vomiting    Morphine Hives and Itching       Prior to Admission Medications:  Prior to Admission medications    Medication Sig Start Date End Date Taking? Authorizing Provider   bumetanide (BUMEX) 1 mg tablet Take 2 Tabs by mouth two (2) times a day. 6/15/18  Yes Linnea Orta MD   hydrALAZINE (APRESOLINE) 50 mg tablet Take 1 Tab by mouth three (3) times daily. 6/15/18  Yes Linnea Orta MD   metOLazone (ZAROXOLYN) 5 mg tablet Take 1 Tab by mouth daily. 6/15/18  Yes Linnea Orta MD   amLODIPine (NORVASC) 10 mg tablet Take 10 mg by mouth daily. Yes Historical Provider   oxyCODONE-acetaminophen (PERCOCET) 7.5-325 mg per tablet Take 1 Tab by mouth every four (4) hours as needed for Pain. Yes Historical Provider   allopurinol (ZYLOPRIM) 100 mg tablet Take 100 mg by mouth daily. Yes Historical Provider   albuterol (PROAIR HFA) 90 mcg/actuation inhaler Take 2 Puffs by inhalation every four (4) hours as needed for Wheezing. Yes Historical Provider   carvedilol (COREG) 25 mg tablet TAKE 1 TABLET BY MOUTH TWICE A DAY 11/2/17  Yes Rose Burnham MD   isosorbide mononitrate ER (IMDUR) 120 mg CR tablet TAKE 1 TABLET BY MOUTH EVERY DAY 10/19/17  Yes Rose Burnham MD   pantoprazole (PROTONIX) 40 mg tablet TAKE 1 TABLET BY MOUTH EVERY DAY FOR HEARTBURN 10/18/17  Yes Kelsie Cortez MD   insulin aspart protamine/insulin aspart (NOVOLOG MIX 70-30) 100 unit/mL (70-30) injection by SubCUTAneous route three (3) times daily as needed (for BG  > 140 mg/dL). Uses sliding scale    Yes Historical Provider   calcitRIOL (ROCALTROL) 0.25 mcg capsule Take 0.25 mcg by mouth every Monday, Wednesday, Friday, Saturday. Yes Historical Provider   ergocalciferol (VITAMIN D2) 50,000 unit capsule Take 50,000 Units by mouth every Tuesday.    Yes Phys Tk, MD   aspirin 81 mg tablet Take 81 mg by mouth daily. Yes Phys Other, MD   atorvastatin (LIPITOR) 80 mg tablet Take 80 mg by mouth nightly. Yes Historical Provider   albuterol (PROVENTIL VENTOLIN) 2.5 mg /3 mL (0.083 %) nebulizer solution 2.5 mg by Nebulization route every four (4) hours as needed. 3/20/17   Historical Provider   fluticasone (FLONASE) 50 mcg/actuation nasal spray 2 Sprays by Both Nostrils route nightly as needed. Historical Provider   nitroglycerin (NITROSTAT) 0.3 mg SL tablet 1 Tab by SubLINGual route every five (5) minutes as needed for Chest Pain. 6/24/16   Noelle Ronquillo MD       Physical Exam:    Blood pressure 142/60, pulse 75, temperature 98.2 °F (36.8 °C), resp. rate 16, height 5' 10\" (1.778 m), weight 141.5 kg (311 lb 15.2 oz), SpO2 98 %. General: alert, cooperative, no distress, appears stated age  Heart: rrr  Lungs: clear to auscultation bilaterally  Abdomen: soft  Neuro: grossly intact  Extremities: extremities wnl, fistula    Plan of Care/Planned Procedure:  Risks, benefits, and alternatives reviewed with patient and she agrees to proceed with the procedure.      Corey Lucero MD

## 2018-06-18 NOTE — ROUTINE PROCESS
TRANSFER - IN REPORT:    Verbal report received from ED, RN(name) on Sun Killings  being received from CLPO(unit) for ordered procedure      Report consisted of patients Situation, Background, Assessment and   Recommendations(SBAR). Information from the following report(s) SBAR was reviewed with the receiving nurse. Opportunity for questions and clarification was provided. Assessment completed upon patients arrival to unit and care assumed.

## 2018-06-18 NOTE — PROGRESS NOTES
1000 Received patient from waiting area. Armband and allergies verbally confirmed with patient. Procedure explained and consents signed. 1200 TRANSFER - OUT REPORT:    Verbal report given to Stephanie Newton rn(name) on Mervat Lindsay  being transferred to angio lab(unit) for routine progression of care       Report consisted of patients Situation, Background, Assessment and   Recommendations(SBAR). Information from the following report(s) Procedure Summary was reviewed with the receiving nurse. Lines:   Peripheral IV 06/18/18 Right Antecubital (Active)   Site Assessment Clean, dry, & intact 6/18/2018 10:10 AM        Opportunity for questions and clarification was provided. Patient transported with:   Registered Nurse      6 Ying Roland REPORT:    Verbal report received from jose l(name) on Mervat Lindsay  being received from angio lab(unit) for routine progression of care      Report consisted of patients Situation, Background, Assessment and   Recommendations(SBAR). Information from the following report(s) Procedure Summary was reviewed with the receiving nurse. Opportunity for questions and clarification was provided. Assessment completed upon patients arrival to unit and care assumed. Right antecubital iv catheter site no longer working, attempting to find another site, unsuccessful, warren to angio lab to see if we can give antibiotics via permacath b/p on left leg  1335 using permacath to infuse antibiotics, pt awake alert     1310 antibiotics infused, permacath flushed and 1.9cc heparin instilled per protocol, preparing for discharge, reviewed discharge instructions with pt and , both verbalized understanding, added information about hemodialysis to discharge instructions since this is new to pt    1345 Copy of printed discharge instructions given to patient and     , Patient escorted via wheelchair to entrance.  driving.  Patient discharged into care of . Monroe Luna

## 2018-06-19 ENCOUNTER — OFFICE VISIT (OUTPATIENT)
Dept: CARDIOLOGY CLINIC | Age: 63
End: 2018-06-19

## 2018-06-19 VITALS
RESPIRATION RATE: 20 BRPM | WEIGHT: 293 LBS | HEIGHT: 70 IN | BODY MASS INDEX: 41.95 KG/M2 | SYSTOLIC BLOOD PRESSURE: 114 MMHG | OXYGEN SATURATION: 95 % | DIASTOLIC BLOOD PRESSURE: 52 MMHG | HEART RATE: 84 BPM

## 2018-06-19 DIAGNOSIS — I50.32 CHRONIC DIASTOLIC HEART FAILURE (HCC): Primary | ICD-10-CM

## 2018-06-19 DIAGNOSIS — E66.01 MORBID OBESITY (HCC): Chronic | ICD-10-CM

## 2018-06-19 DIAGNOSIS — Z99.2 ESRD ON HEMODIALYSIS (HCC): ICD-10-CM

## 2018-06-19 DIAGNOSIS — Z79.01 WARFARIN ANTICOAGULATION: ICD-10-CM

## 2018-06-19 DIAGNOSIS — I25.118 ATHEROSCLEROSIS OF NATIVE CORONARY ARTERY OF NATIVE HEART WITH STABLE ANGINA PECTORIS (HCC): ICD-10-CM

## 2018-06-19 DIAGNOSIS — D64.9 NORMOCYTIC ANEMIA: Chronic | ICD-10-CM

## 2018-06-19 DIAGNOSIS — J84.9 ILD (INTERSTITIAL LUNG DISEASE) (HCC): ICD-10-CM

## 2018-06-19 DIAGNOSIS — I27.20 PULMONARY HTN (HCC): Chronic | ICD-10-CM

## 2018-06-19 DIAGNOSIS — J44.9 COPD, SEVERE (HCC): ICD-10-CM

## 2018-06-19 DIAGNOSIS — N18.4 TYPE 2 DIABETES MELLITUS WITH STAGE 4 CHRONIC KIDNEY DISEASE, WITH LONG-TERM CURRENT USE OF INSULIN (HCC): ICD-10-CM

## 2018-06-19 DIAGNOSIS — Z79.4 TYPE 2 DIABETES MELLITUS WITH STAGE 4 CHRONIC KIDNEY DISEASE, WITH LONG-TERM CURRENT USE OF INSULIN (HCC): ICD-10-CM

## 2018-06-19 DIAGNOSIS — Z95.9 S/P LEFT PULMONARY ARTERY PRESSURE SENSOR IMPLANT PLACEMENT: ICD-10-CM

## 2018-06-19 DIAGNOSIS — Z86.718 HX OF DEEP VENOUS THROMBOSIS: ICD-10-CM

## 2018-06-19 DIAGNOSIS — G47.33 OBSTRUCTIVE SLEEP APNEA SYNDROME: ICD-10-CM

## 2018-06-19 DIAGNOSIS — E11.22 TYPE 2 DIABETES MELLITUS WITH STAGE 4 CHRONIC KIDNEY DISEASE, WITH LONG-TERM CURRENT USE OF INSULIN (HCC): ICD-10-CM

## 2018-06-19 DIAGNOSIS — G47.33 OSA ON CPAP: ICD-10-CM

## 2018-06-19 DIAGNOSIS — I10 ESSENTIAL HYPERTENSION: ICD-10-CM

## 2018-06-19 DIAGNOSIS — N18.6 ESRD ON HEMODIALYSIS (HCC): ICD-10-CM

## 2018-06-19 DIAGNOSIS — Z99.89 OSA ON CPAP: ICD-10-CM

## 2018-06-19 NOTE — MR AVS SNAPSHOT
[address] [facility] [address] 
[phone] Patient: Sun Alexander MRN: [id number] DHC:[id number] Visit Information Date & Time Provider Department Dept. Phone Encounter #  
 6/19/2018  2:20 PM Angie Ramirez MD [facility] Svetlana Purvis [phone] [id number] Follow-up Instructions Return in about 3 months (around 9/19/2018). 6/26/2018  1:15 PM  
TRANSITIONAL CARE MANAGEMENT with Nolan Levine DO [address] [facility] ([address]) Appt Note: hospital f/u león: chest pain, chf  
 [id number] [place] [address] Road [address] [phone]  
  
   
 [address] [place] [address] Road [address] Upcoming Health Maintenance Date Due  
 FOOT EXAM Q1 12/14/1967 MICROALBUMIN Q1 12/14/1967 EYE EXAM RETINAL OR DILATED Q1 12/14/1967 Pneumococcal 19-64 Highest Risk (1 of 3 - PCV13) 12/14/1976 DTaP/Tdap/Td series (1 - Tdap) 12/14/1978 PAP AKA CERVICAL CYTOLOGY 12/14/1978 BREAST CANCER SCRN MAMMOGRAM 12/14/2007 LIPID PANEL Q1 11/9/2015 ZOSTER VACCINE AGE 60> 10/14/2017 FOBT Q 1 YEAR AGE 50-75 2/18/2018 MEDICARE YEARLY EXAM 5/18/2018 Influenza Age 5 to Adult 8/1/2018 HEMOGLOBIN A1C Q6M 11/30/2018 Allergies as of 6/19/2018  Review Complete On: 6/19/2018 By: Jose Alfredo Kamara LPN Severity Noted Reaction Type Reactions Contrast Dye [Iodine] High 02/28/2011   Systemic Anaphylaxis Tolerates when pre medicated w/ IV solumedrol and benadryl prior to procedure Levaquin [Levofloxacin]  10/13/2013    Nausea and Vomiting Morphine  02/28/2011   Side Effect Hives, Itching Current Immunizations  Never Reviewed Name Date Influenza Vaccine 12/5/2017 Not reviewed this visit You Were Diagnosed With   
  
 Codes Comments  Chronic diastolic heart failure (HCC)    -  Primary ICD-10-CM: I50.32 
ICD-9-CM: 428.32   
 Hx of deep venous thrombosis     ICD-10-CM: R74.799 ICD-9-CM: V12.51 Atherosclerosis of native coronary artery of native heart with stable angina pectoris ([facility]     ICD-10-CM: I25.118 
ICD-9-CM: 414.01, 413.9 Type 2 diabetes mellitus with stage 4 chronic kidney disease, with long-term current use of insulin (HCC)     ICD-10-CM: E11.22, N18.4, Z79.4 ICD-9-CM: 250.40, 585.4, V58.67 Essential hypertension     ICD-10-CM: I10 
ICD-9-CM: 401.9 JASEN on CPAP     ICD-10-CM: G47.33, Z99.89 ICD-9-CM: 327.23, V46.8 Warfarin anticoagulation     ICD-10-CM: Z79.01 
ICD-9-CM: V58.61 S/P left pulmonary artery pressure sensor implant placement     ICD-10-CM: Z95.9 ICD-9-CM: V45.89 Vitals BP Pulse Resp Height(growth percentile) Weight(growth percentile) SpO2  
 114/52 (BP 1 Location: Right arm, BP Patient Position: Sitting) 84 20 5' 10\" (1.778 m) 306 lb 3.2 oz (138.9 kg) 95% BMI OB Status Smoking Status 43.94 kg/m2 Hysterectomy Former Smoker Vitals History BMI and BSA Data Body Mass Index Body Surface Area 43.94 kg/m 2 2.62 m 2 Preferred Pharmacy Pharmacy Name Phone [id number]- [id number], [address] [place] [address] [phone] Your Updated Medication List  
  
   
This list is accurate as of 6/19/18  3:17 PM.  Always use your most recent med list.  
  
  
  
  
 * PROAIR HFA 90 mcg/actuation inhaler Generic drug:  albuterol Take 2 Puffs by inhalation every four (4) hours as needed for Wheezing. * albuterol 2.5 mg /3 mL (0.083 %) nebulizer solution Commonly known as:  PROVENTIL VENTOLIN  
2.5 mg by Nebulization route every four (4) hours as needed. allopurinol 100 mg tablet Commonly known as:  Charol José Take 100 mg by mouth daily. amLODIPine 10 mg tablet Commonly known as:  Reina Marybel Take 10 mg by mouth daily. aspirin 81 mg tablet Take 81 mg by mouth daily. atorvastatin 80 mg tablet Commonly known as:  LIPITOR Take 80 mg by mouth nightly. bumetanide 1 mg tablet Commonly known as:  [address] Take 2 Tabs by mouth two (2) times a day. calcitRIOL 0.25 mcg capsule Commonly known as:  ROCALTROL Take 0.25 mcg by mouth every Monday, Wednesday, Friday, Saturday. carvedilol 25 mg tablet Commonly known as:  COREG  
TAKE 1 TABLET BY MOUTH TWICE A DAY FLONASE 50 mcg/actuation nasal spray Generic drug:  fluticasone 2 Sprays by Both Nostrils route nightly as needed. hydrALAZINE 50 mg tablet Commonly known as:  APRESOLINE Take 1 Tab by mouth three (3) times daily. isosorbide mononitrate  mg CR tablet Commonly known as:  IMDUR  
TAKE 1 TABLET BY MOUTH EVERY DAY  
  
 metOLazone 5 mg tablet Commonly known as:  Griffin Ronak Take 1 Tab by mouth daily. nitroglycerin 0.3 mg SL tablet Commonly known as:  NITROSTAT  
1 Tab by SubLINGual route every five (5) minutes as needed for Chest Pain. NovoLOG Mix 70-30 U-100 Insuln 100 unit/mL (70-30) injection Generic drug:  insulin aspart protamine/insulin aspart  
by SubCUTAneous route three (3) times daily as needed (for BG  > 140 mg/dL). Uses sliding scale  
  
 pantoprazole 40 mg tablet Commonly known as:  PROTONIX  
TAKE 1 TABLET BY MOUTH EVERY DAY FOR HEARTBURN  
  
 PERCOCET 7.5-325 mg per tablet Generic drug:  oxyCODONE-acetaminophen Take 1 Tab by mouth every four (4) hours as needed for Pain. VITAMIN D2 50,000 unit capsule Generic drug:  ergocalciferol Take 50,000 Units by mouth every Tuesday. * Notice: This list has 2 medication(s) that are the same as other medications prescribed for you. Read the directions carefully, and ask your doctor or other care provider to review them with you. Follow-up Instructions Return in about 3 months (around 9/19/2018). Patient Instructions Re-start Coumadin at previous dosing. Follow up with Dr. Chuck Nieto for INR check next week. Introducing [facility]! Dear Carlos Bradford: Thank you for requesting a [employer] account. Our records indicate that you already have an active Optimus account. You can access your account anytime at [email or web address] Did you know that you can access your hospital and ER discharge instructions at any time in Optimus? You can also review all of your test results from your hospital stay or ER visit. Additional Information If you have questions, please visit the Frequently Asked Questions section of the Optimus website at [email or web address] Remember, Optimus is NOT to be used for urgent needs. For medical emergencies, dial 911. Now available from your iPhone and Android! Please provide this summary of care documentation to your next provider. Your primary care clinician is listed as Lee Edmondson. If you have any questions after today's visit, please call [phone].

## 2018-06-19 NOTE — PROGRESS NOTES
Visit Vitals    /52 (BP 1 Location: Right arm, BP Patient Position: Sitting)    Pulse 84    Resp 20    Ht 5' 10\" (1.778 m)    Wt 306 lb 3.2 oz (138.9 kg)    SpO2 95%    BMI 43.94 kg/m2

## 2018-06-19 NOTE — PROGRESS NOTES
Nora Phillips MD [facility] [place]  [id number]  [place], [address] [facility]    Office [phone]  Fax [phone]  Cell [phone]        Ashlyn Feliciano is a 63 y.o. female Here today for follow up of recent hsopitalization  progressive volume overload, anemia, and subsequent initiation of hemodyalysis. Assessment  Encounter Diagnoses   Name Primary?  Chronic diastolic heart failure (HCC) Yes    Hx of deep venous thrombosis     Atherosclerosis of native coronary artery of native heart with stable angina pectoris (HCC)     Type 2 diabetes mellitus with stage 4 chronic kidney disease, with long-term current use of insulin (HCC)     Essential hypertension     JASEN on CPAP     Warfarin anticoagulation     S/P left pulmonary artery pressure sensor implant placement     Pulmonary HTN     Morbid obesity     Normocytic anemia     ILD (interstitial lung disease) (HCC)     COPD, severe (HCC)     Obstructive sleep apnea syndrome     ESRD on hemodialysis ([facility] [address]        Recommendations:    1. HFpEF, class 3 with moderate to severe PA HTN, s/p cardioMEMS, recent recalibration Her volume status clinically is significantly improved with dialysis. She will transmit PA reading tomorrow. She is still on diuretic therapy. Will share this data with her nephrology team.     2. Chronic lung disease, oxygen dependent. 3. HTN, controlled on combination therapy. Suspect a lot of her HTN was volume driven. 4. CAD, RCA  by cath 2014. Lexiscan cardiolite 2016 normal. She has atypical chest pain that I doubt is angina. Nevertheless she is on optimal medical therapy. Right radial artery cath was attempted recently without success. 5. Chronic anemia due to ESRD procrit resistant    6. T2DM managed by Lou Owens DO    7. Morbid Obesity    8. Chronic anticoagulation due to previous DVT. Would resume Coumadin at this time at her previous dose.  INR will be followed by  Julianna.     Follow-up Disposition:  Return in about 3 months (around 9/19/2018). Subjective:  I saw during her recent hospitalization. She underwent RHC with re-calibration of cardioMEMS. Her filling pressures were high and despite diuretic dose escalation, her volume status was not improved and her renal function worsened. She was started on hemodialysis. Patient notes she has had 4 dialysis sessions so far, with most recent session today. She states her breathing has significantly improved, and she can now lay flat without becoming short of breath. However, she is still on O2. Patient is compliant with Bumex and Metolazone (daily). She states her lower extremity swelling has also significantly improved with dialysis, but has not resolved. The previous dose of Procrit was felt to be ineffective for her anemia and was stopped. She was transfused twice in the hospital.    Ms. Nic Pablo has not been on her Coumadin for the past week. She had a tunnel port placed yesterday for dialysis. Left arm Av fistula is not ready for use yet. She is followed by Dr. Gary Gibbons for INR check, and has an appointment scheduled for next week. Patient denies any, palpitations, syncope, orthopnea, edema or paroxysmal nocturnal dyspnea. Cardiac risk factors   HTN yes  DM yes    Cardiac testing  CATH: 2004: 1v CAD by cath - PCI OM with SAL  CATH 5/2004 - patent stent, no new disease   Lexiscan cardiolite 12/09- normal perfusion, EF 66%  ECHO 11/2009: LVH, EF 60%, diastolic dysfunction  STRESS/LEXISCAN/CARDIOLITE 3/29/11: normal perfusion, EF 62%  CATH: 3/20/2012 :PA 55/30 mean 41, wedge 26, RA 24, EDP 35, LVG 55%, LM normal, LAD normal, LCX - OM1 patent stent, OM2 prox 85% long, % w/ L -> R collateral; s/p SAL-> OM2/OM3 with SAL. CATH: 10/4/2012: EDP 25, EF 55%, LM nl, LAD mild plaque, LCX patent OM stents, % prox with good L to R collaterals.   Cath 3/18/2014 - RA 9 PA 42/19/29, PCW 12, EDP 25, no LVG due to CKD, LM nl, LAD mild plaque, LCX patent stents OM1/OM2, RCA chronic proximal occlusion with L to R collaterals. ECHO 4/11/16: EF 60-65%. No WMA. LA mildly dilated. Lexiscan Cardiolite 4/11/16: Normal. LVEF 55%. Compared to 3/29/11, no significant changes. RHC/CardioMEMS implant 8/31/17 - RA 12, PA 56/20/30, CI (Mayte) 2.65    Echo 11/30/17 - EF 65%. G1DD. No WMA. Wall thickness was mildly to moderately increased. Limited Echo 12/13/17 - Tricuspid valve: Pulmonary artery diastolic pressure: 59.26 mmHg. [address] 6/5/18 Moderate-severe PA HTN, post capillary  Marked elevation in biV filling pressures    Past Medical History:   Diagnosis Date     Sleep Apnea 2/16/2011    Aortic aneurysm ([facility])     CAD (coronary Artery Disease) Native Artery 2/16/2011    CKD (chronic kidney disease) stage 4, GFR 15-29 ml/min ([facility]) 2/10/2017    COPD (chronic obstructive pulmonary disease) ([facility])     Diabetic gastroparesis ([facility])     Diastolic heart failure ([address] 10/5/2012    DM type 2 causing neurological disease ([address]     DM type 2 causing renal disease ([address]     DM type 2 causing vascular disease ([address]     Esophageal stricture 2012    dialted Dr. Marissa Martinez [id number] deficiency [facility])     trait    GERD (gastroesophageal reflux disease)     Gout     ILD (interstitial lung disease) ([address]     open lung bx CJW 2010    Morbid obesity ([address]     OA (osteoarthritis)     Ovarian cancer ([address]     cervical and uterine    Rheumatoid arteritis     S/P left pulmonary artery pressure sensor implant placement 8/31/2017    Cardiomems     Tobacco use disorder 3/21/2012    Uterine cervix cancer ([address]     Vitamin D deficiency 8/9/2013        Current Outpatient Prescriptions   Medication Sig Dispense Refill    bumetanide (BUMEX) 1 mg tablet Take 2 Tabs by mouth two (2) times a day. 30 Tab 0    hydrALAZINE (APRESOLINE) 50 mg tablet Take 1 Tab by mouth three (3) times daily. 90 Tab 1    metOLazone (ZAROXOLYN) 5 mg tablet Take 1 Tab by mouth daily.  27 Tab 1    amLODIPine (NORVASC) 10 mg tablet Take 10 mg by mouth daily.  allopurinol (ZYLOPRIM) 100 mg tablet Take 100 mg by mouth daily.  albuterol (PROAIR HFA) 90 mcg/actuation inhaler Take 2 Puffs by inhalation every four (4) hours as needed for Wheezing.  carvedilol (COREG) 25 mg tablet TAKE 1 TABLET BY MOUTH TWICE A  Tab 3    isosorbide mononitrate ER (IMDUR) 120 mg CR tablet TAKE 1 TABLET BY MOUTH EVERY DAY 90 Tab 3    pantoprazole (PROTONIX) 40 mg tablet TAKE 1 TABLET BY MOUTH EVERY DAY FOR HEARTBURN 90 Tab 2    albuterol (PROVENTIL VENTOLIN) 2.5 mg /3 mL (0.083 %) nebulizer solution 2.5 mg by Nebulization route every four (4) hours as needed.  insulin aspart protamine/insulin aspart (NOVOLOG MIX 70-30) 100 unit/mL (70-30) injection by SubCUTAneous route three (3) times daily as needed (for BG  > 140 mg/dL). Uses sliding scale       fluticasone (FLONASE) 50 mcg/actuation nasal spray 2 Sprays by Both Nostrils route nightly as needed.  calcitRIOL (ROCALTROL) 0.25 mcg capsule Take 0.25 mcg by mouth every Monday, Wednesday, Friday, Saturday.  nitroglycerin (NITROSTAT) 0.3 mg SL tablet 1 Tab by SubLINGual route every five (5) minutes as needed for Chest Pain. 1 Bottle 1    ergocalciferol (VITAMIN D2) 50,000 unit capsule Take 50,000 Units by mouth every Tuesday.  aspirin 81 mg tablet Take 81 mg by mouth daily.  atorvastatin (LIPITOR) 80 mg tablet Take 80 mg by mouth nightly.  oxyCODONE-acetaminophen (PERCOCET) 7.5-325 mg per tablet Take 1 Tab by mouth two (2) times daily as needed for Pain. Max Daily Amount: 2 Tabs.  60 Tab 0    warfarin (COUMADIN) 2 mg tablet 3mg PO everyday except Monday Friday take 2mg 30 Tab 11    warfarin (COUMADIN) 1 mg tablet 3mg PO everyday except Monday Friday take 2mg 30 Tab 11       Allergies   Allergen Reactions    Contrast Dye [Iodine] Anaphylaxis     Tolerates when pre medicated w/ IV solumedrol and benadryl prior to procedure  Levaquin [Levofloxacin] Nausea and Vomiting    Morphine Hives and Itching      Review of Systems  Constitutional: Negative for fever, chills, malaise/fatigue and diaphoresis. Respiratory: Negative for cough, hemoptysis, sputum production, shortness of breath and wheezing. Cardiovascular: Negative for chest pain, palpitations, orthopnea, claudication, leg swelling and PND. Gastrointestinal: Negative for heartburn, nausea, vomiting, blood in stool and melena. Genitourinary: Negative for dysuria and flank pain. Musculoskeletal: Negative for joint pain and back pain. Skin: Negative for rash. Neurological: Negative for focal weakness, seizures, loss of consciousness, weakness and headaches. Endo/Heme/Allergies: Does not bruise/bleed easily. Psychiatric/Behavioral: Negative for memory loss. The patient does not have insomnia. Physical Exam    Visit Vitals    /52 (BP 1 Location: Right arm, BP Patient Position: Sitting)    Pulse 84    Resp 20    Ht 5' 10\" (1.778 m)    Wt 306 lb 3.2 oz (138.9 kg)    SpO2 95%    BMI 43.94 kg/m2     Wt Readings from Last 3 Encounters:   06/21/18 306 lb (138.8 kg)   06/19/18 306 lb 3.2 oz (138.9 kg)   06/18/18 311 lb 15.2 oz (141.5 kg)      General - well developed well nourished  Neck - JVP normal, thyroid nl  Cardiac - normal S1, S2, no murmurs, rubs or gallops. No clicks  Vascular - carotids without bruits, radials, femorals and pedal pulses equal bilateral  Lungs - clear to auscultation bilaterals, no rales, wheezing or rhonchi  Abd - soft nontender, no HSM, no abd bruits  Extremities - 1-2+ pedal edema and pre-tibial edema. Skin - no rash  Neuro - nonfocal  Psych - normal mood and affect      Cardiographics      Written by Teresita Denis, as dictated by Dr. Caitlyn Cuevas.    Caitlyn Cuevas MD

## 2018-06-20 ENCOUNTER — HOME CARE VISIT (OUTPATIENT)
Dept: HOME HEALTH SERVICES | Facility: HOME HEALTH | Age: 63
End: 2018-06-20

## 2018-06-20 ENCOUNTER — PATIENT OUTREACH (OUTPATIENT)
Dept: FAMILY MEDICINE CLINIC | Age: 63
End: 2018-06-20

## 2018-06-20 NOTE — PROGRESS NOTES
Hospital Discharge Follow-Up      Date/Time:  6/20/2018 11:10 AM    Patient was admitted to [facility] on 5/30/18 and discharged on 6/15/18 for Chest pain and anemia. The physician discharge summary was not available at the time of outreach. Attempts to contact patient within 2 business days of discharge. Top Challenges reviewed with the provider   1. Unable to contact   2. ESRD Permacath placed on 6/18-starting HD Tues, Thurs and Sat  3. Cardiac Mems placed-Needs to follow strict I/o's weights and low salt. Method of communication with provider :face to face, chart routing    Inpatient RRAT score: 28  Was this a readmission? no     Patient had Permacath procedure Monday 6/18/18, unavailable for  Follow up. 6/19 Further chart review shows patient has cardiology appt today. Will attempt to call again tomorrow. 6/20/18 LM on . If no return call, will follow up with patient at Dr Leonarda Lang appt tomorrow. Nurse Navigator (NN) met face to face with patient at [facility]. States she was very tired. Kept holding her head and said she really just wanted to go to sleep right now but knew she need her INR today and came in. She had just finished her morning dialysis session. Kept appt brief; did not provided education at this time since it was not appropriate. Agreed to my call next Wed to talk further. Dyspnea with minimal exertion. States this is not new and is slightly improved since hospitalization and HD. Patient given an opportunity to ask questions and does not have any further questions or concerns at this time. The patient agrees to contact the PCP office for questions related to their healthcare. Made aware of on-call services after hours. NN provided contact information for future reference. Disease Specific:  Chronic Resp failure-COPD, CHF, CAD ESRD, DM    Summary of patient's top problems:  1. CHF Per cardio cor pulmonale in near future\"  2.  ESRD- new HD pt.  3. COPD-No curative treatment, just palliative treatment with diuretics.  Likely to progress. Palliative care consulted.  Continue Oxygen as needed.  CT 6/8 showed multiple complex abnormalities, which s parainfluenza vs progressive changes. Home Health orders at discharge: [facility] (6/17/18 completed)    Barriers to care? Will need to be determined    Advance Care Planning:   Does patient have an Advance Directive:  not on file; will need to discuss further and if interested will provide blue honoring choices folder. Palliative saw inpatient. Medication(s): Unknown; not included in dc report. Medication reconciliation was performed here in office with patient. who verbalizes understanding of administration of home medications. There were no barriers to obtaining medications identified at this time. Referral to Pharm D needed: no      Current Outpatient Prescriptions   Medication Sig    bumetanide (BUMEX) 1 mg tablet Take 2 Tabs by mouth two (2) times a day.  hydrALAZINE (APRESOLINE) 50 mg tablet Take 1 Tab by mouth three (3) times daily.  metOLazone (ZAROXOLYN) 5 mg tablet Take 1 Tab by mouth daily.  amLODIPine (NORVASC) 10 mg tablet Take 10 mg by mouth daily.  oxyCODONE-acetaminophen (PERCOCET) 7.5-325 mg per tablet Take 1 Tab by mouth every four (4) hours as needed for Pain.  allopurinol (ZYLOPRIM) 100 mg tablet Take 100 mg by mouth daily.  albuterol (PROAIR HFA) 90 mcg/actuation inhaler Take 2 Puffs by inhalation every four (4) hours as needed for Wheezing.  carvedilol (COREG) 25 mg tablet TAKE 1 TABLET BY MOUTH TWICE A DAY    isosorbide mononitrate ER (IMDUR) 120 mg CR tablet TAKE 1 TABLET BY MOUTH EVERY DAY    pantoprazole (PROTONIX) 40 mg tablet TAKE 1 TABLET BY MOUTH EVERY DAY FOR HEARTBURN    albuterol (PROVENTIL VENTOLIN) 2.5 mg /3 mL (0.083 %) nebulizer solution 2.5 mg by Nebulization route every four (4) hours as needed.     insulin aspart protamine/insulin aspart (NOVOLOG MIX 70-30) 100 unit/mL (70-30) injection by SubCUTAneous route three (3) times daily as needed (for BG  > 140 mg/dL). Uses sliding scale     fluticasone (FLONASE) 50 mcg/actuation nasal spray 2 Sprays by Both Nostrils route nightly as needed.  calcitRIOL (ROCALTROL) 0.25 mcg capsule Take 0.25 mcg by mouth every Monday, Wednesday, Friday, Saturday.  nitroglycerin (NITROSTAT) 0.3 mg SL tablet 1 Tab by SubLINGual route every five (5) minutes as needed for Chest Pain.  ergocalciferol (VITAMIN D2) 50,000 unit capsule Take 50,000 Units by mouth every Tuesday.  aspirin 81 mg tablet Take 81 mg by mouth daily.  atorvastatin (LIPITOR) 80 mg tablet Take 80 mg by mouth nightly. No current facility-administered medications for this visit. Do you have a Scale:    yes   How often do you weigh:  she is not, states they weigh me at Dialysis. Daily Weight   Amount:  306      Provider Notified:  Taken in office    Zone:(Pt Reported) Deferred    EF: 60 (result) on Dec 2017 (date)   Type of HF:   HFpEF     Cardiac Device present: cardioMEMS      Heart Failure Medications: Betablocker, Diuretic, Anticoagulant     PCP/Specialist follow up: Future Appointments  Date Time Provider Meagan Mcintyre   6/21/2018 1:30 PM Abdulaziz Russell DO [facility] SCHED   9/21/2018 3:40 PM Mayra Rosen MD [address] [facility]    . [facility] Goals      Advance Care Planning      Attends follow-up appointments as directed. Dr Shante Smith (6/19) completed; Dr Corine Peters 6/21 completed; SB Flores and Sat. Schedules and keeps all appts.  Knowledge and adherence of prescribed medication (ie. action, side effects, missed dose, etc.). Will need to assess usage, administration and storage at next contact.  Patient verbalizes understanding of self-management goals of living with Congestive Heart Failure            Will need to evaluate knowledge and review at next contact.   Is she following strict I&Os? Wt outside of dialysis? Diet?

## 2018-06-21 ENCOUNTER — HOSPITAL ENCOUNTER (OUTPATIENT)
Dept: LAB | Age: 63
Discharge: HOME | End: 2018-06-21
Payer: MEDICARE

## 2018-06-21 ENCOUNTER — OFFICE VISIT (OUTPATIENT)
Dept: FAMILY MEDICINE CLINIC | Age: 63
End: 2018-06-21

## 2018-06-21 VITALS
HEART RATE: 85 BPM | DIASTOLIC BLOOD PRESSURE: 56 MMHG | BODY MASS INDEX: 41.95 KG/M2 | OXYGEN SATURATION: 94 % | SYSTOLIC BLOOD PRESSURE: 96 MMHG | TEMPERATURE: 98.3 F | WEIGHT: 293 LBS | RESPIRATION RATE: 20 BRPM | HEIGHT: 70 IN

## 2018-06-21 DIAGNOSIS — M25.561 CHRONIC PAIN OF RIGHT KNEE: ICD-10-CM

## 2018-06-21 DIAGNOSIS — N18.4 CKD (CHRONIC KIDNEY DISEASE) STAGE 4, GFR 15-29 ML/MIN (HCC): Chronic | ICD-10-CM

## 2018-06-21 DIAGNOSIS — N18.4 TYPE 2 DIABETES MELLITUS WITH STAGE 4 CHRONIC KIDNEY DISEASE, WITH LONG-TERM CURRENT USE OF INSULIN (HCC): Primary | Chronic | ICD-10-CM

## 2018-06-21 DIAGNOSIS — G89.29 CHRONIC PAIN OF RIGHT KNEE: ICD-10-CM

## 2018-06-21 DIAGNOSIS — E11.22 TYPE 2 DIABETES MELLITUS WITH STAGE 4 CHRONIC KIDNEY DISEASE, WITH LONG-TERM CURRENT USE OF INSULIN (HCC): Primary | Chronic | ICD-10-CM

## 2018-06-21 DIAGNOSIS — Z79.4 TYPE 2 DIABETES MELLITUS WITH STAGE 4 CHRONIC KIDNEY DISEASE, WITH LONG-TERM CURRENT USE OF INSULIN (HCC): Primary | Chronic | ICD-10-CM

## 2018-06-21 DIAGNOSIS — Z79.01 WARFARIN ANTICOAGULATION: ICD-10-CM

## 2018-06-21 DIAGNOSIS — Z51.81 MEDICATION MONITORING ENCOUNTER: ICD-10-CM

## 2018-06-21 DIAGNOSIS — G89.4 CHRONIC PAIN SYNDROME: ICD-10-CM

## 2018-06-21 DIAGNOSIS — Z74.09 LIMITED MOBILITY: ICD-10-CM

## 2018-06-21 LAB
INR BLD: 1.2
PT POC: 14 SECONDS
VALID INTERNAL CONTROL?: YES

## 2018-06-21 PROCEDURE — 80307 DRUG TEST PRSMV CHEM ANLYZR: CPT

## 2018-06-21 PROCEDURE — 80365 DRUG SCREENING OXYCODONE: CPT

## 2018-06-21 RX ORDER — WARFARIN 2 MG/1
TABLET ORAL
Refills: 11 | COMMUNITY
Start: 2018-04-13 | End: 2018-06-21 | Stop reason: SDUPTHER

## 2018-06-21 RX ORDER — OXYCODONE AND ACETAMINOPHEN 7.5; 325 MG/1; MG/1
1 TABLET ORAL
Qty: 60 TAB | Refills: 0 | Status: SHIPPED | OUTPATIENT
Start: 2018-06-21 | End: 2018-08-07 | Stop reason: SDUPTHER

## 2018-06-21 RX ORDER — WARFARIN 2 MG/1
TABLET ORAL
Qty: 30 TAB | Refills: 11 | Status: SHIPPED | OUTPATIENT
Start: 2018-06-21 | End: 2018-07-12 | Stop reason: SDUPTHER

## 2018-06-21 RX ORDER — WARFARIN 1 MG/1
TABLET ORAL
Qty: 30 TAB | Refills: 11 | Status: SHIPPED | OUTPATIENT
Start: 2018-06-21 | End: 2018-08-16 | Stop reason: SDUPTHER

## 2018-06-21 NOTE — PROGRESS NOTES
Chief Complaint   Patient presents with   [facility] Follow Up     follow up from [facility] 5/30-6/15     Coagulation disorder     1. Have you been to the ER, urgent care clinic since your last visit? Hospitalized since your last visit? See chief complaint. 2. Have you seen or consulted any other health care providers outside of the [address] [place] [address] since your last visit? Include any pap smears or colon screening.  No

## 2018-06-21 NOTE — PATIENT INSTRUCTIONS
Body Mass Index: Care Instructions  Your Care Instructions    Body mass index (BMI) can help you see if your weight is raising your risk for health problems. It uses a formula to compare how much you weigh with how tall you are. · A BMI lower than 18.5 is considered underweight. · A BMI between 18.5 and 24.9 is considered healthy. · A BMI between 25 and 29.9 is considered overweight. A BMI of 30 or higher is considered obese. If your BMI is in the normal range, it means that you have a lower risk for weight-related health problems. If your BMI is in the overweight or obese range, you may be at increased risk for weight-related health problems, such as high blood pressure, heart disease, stroke, arthritis or joint pain, and diabetes. If your BMI is in the underweight range, you may be at increased risk for health problems such as fatigue, lower protection (immunity) against illness, muscle loss, bone loss, hair loss, and hormone problems. BMI is just one measure of your risk for weight-related health problems. You may be at higher risk for health problems if you are not active, you eat an unhealthy diet, or you drink too much alcohol or use tobacco products. Follow-up care is a key part of your treatment and safety. Be sure to make and go to all appointments, and call your doctor if you are having problems. It's also a good idea to know your test results and keep a list of the medicines you take. How can you care for yourself at home? · Practice healthy eating habits. This includes eating plenty of fruits, vegetables, whole grains, lean protein, and low-fat dairy. · If your doctor recommends it, get more exercise. Walking is a good choice. Bit by bit, increase the amount you walk every day. Try for at least 30 minutes on most days of the week. · Do not smoke. Smoking can increase your risk for health problems. If you need help quitting, talk to your doctor about stop-smoking programs and medicines. These can increase your chances of quitting for good. · Limit alcohol to 2 drinks a day for men and 1 drink a day for women. Too much alcohol can cause health problems. If you have a BMI higher than 25  · Your doctor may do other tests to check your risk for weight-related health problems. This may include measuring the distance around your waist. A waist measurement of more than 40 inches in men or 35 inches in women can increase the risk of weight-related health problems. · Talk with your doctor about steps you can take to stay healthy or improve your health. You may need to make lifestyle changes to lose weight and stay healthy, such as changing your diet and getting regular exercise. If you have a BMI lower than 18.5  · Your doctor may do other tests to check your risk for health problems. · Talk with your doctor about steps you can take to stay healthy or improve your health. You may need to make lifestyle changes to gain or maintain weight and stay healthy, such as getting more healthy foods in your diet and doing exercises to build muscle. Where can you learn more? Go to http://lindsay-kai.info/. Enter S176 in the search box to learn more about \"Body Mass Index: Care Instructions. \"  Current as of: October 13, 2016  Content Version: 11.4  © 6114-6793 [employer]. Care instructions adapted under license by [employer] (which disclaims liability or warranty for this information). If you have questions about a medical condition or this instruction, always ask your healthcare professional. [address] any warranty or liability for your use of this information.

## 2018-06-21 NOTE — PROGRESS NOTES
Jj Sweeney is a 63 y.o. female   Chief Complaint   Patient presents with   [facility] Follow Up     follow up from [facility] 5/30-6/15     Coagulation disorder    pt here for hosp f.u and is now doing dialysis on a tues thurs sat schedule. Pt was also recently hospitalized and the coumadin had been stopped and was restarted on Tuesday. Pt also had a port put in by IR states her AV fistula they are having to see why it is not working. Pt is urinating. Heart Cath failed and pt has continued chest pain but states it is a little better more like a pressure. Pt would like an electric scooter to help with mobility given her fatigue and chronic pain in legs chest and back. Pt has limited mobility and chronic pain of her knees and back which adds to her immobility. She would benefit from an electric scooter and would be able to get in and out of the scooter. Pt also needs refill of her oxycodone for her chronic pain. Pt last took one last night but had dialysis today. This helps ,helio her life a little more tolerable especially with her joint pains and her chronic anginal symptoms. Opioid/Pain Management:    1. Has the patient signed a pain contract for chronic narcotic use? yes  2. Has the patient had a UDS or Serum screen as per guidelines as per [place] [employer]?:   yes    3. Does patient meet necessary guidelines for Naloxone treatement per the [place] [employer]?:    no  4. Has the Prescription Monitoring Program been reviewed? yes  5. Does patient have a long term condition that requires long term use of a Narcotic?  yes  6. Has patient been tolerant of therapy and responsible to routine follow up and specialist follow-up? Yes      she is a 63y.o. year old female who presents for evalution. Reviewed PmHx, RxHx, FmHx, SocHx, AllgHx and updated and dated in the chart.     Review of Systems - negative except as listed above in the HPI    Objective:     Vitals:    06/21/18 1331   BP: 96/56   Pulse: 85   Resp: 20   Temp: 98.3 °F (36.8 °C)   TempSrc: Oral   SpO2: 94%   Weight: 306 lb (138.8 kg)   Height: 5' 10\" (1.778 m)       Current Outpatient Prescriptions   Medication Sig    oxyCODONE-acetaminophen (PERCOCET) 7.5-325 mg per tablet Take 1 Tab by mouth two (2) times daily as needed for Pain. Max Daily Amount: 2 Tabs.  warfarin (COUMADIN) 2 mg tablet 3mg PO everyday except Monday Friday take 2mg    warfarin (COUMADIN) 1 mg tablet 3mg PO everyday except Monday Friday take 2mg    bumetanide (BUMEX) 1 mg tablet Take 2 Tabs by mouth two (2) times a day.  hydrALAZINE (APRESOLINE) 50 mg tablet Take 1 Tab by mouth three (3) times daily.  metOLazone (ZAROXOLYN) 5 mg tablet Take 1 Tab by mouth daily.  amLODIPine (NORVASC) 10 mg tablet Take 10 mg by mouth daily.  allopurinol (ZYLOPRIM) 100 mg tablet Take 100 mg by mouth daily.  albuterol (PROAIR HFA) 90 mcg/actuation inhaler Take 2 Puffs by inhalation every four (4) hours as needed for Wheezing.  carvedilol (COREG) 25 mg tablet TAKE 1 TABLET BY MOUTH TWICE A DAY    isosorbide mononitrate ER (IMDUR) 120 mg CR tablet TAKE 1 TABLET BY MOUTH EVERY DAY    pantoprazole (PROTONIX) 40 mg tablet TAKE 1 TABLET BY MOUTH EVERY DAY FOR HEARTBURN    albuterol (PROVENTIL VENTOLIN) 2.5 mg /3 mL (0.083 %) nebulizer solution 2.5 mg by Nebulization route every four (4) hours as needed.  insulin aspart protamine/insulin aspart (NOVOLOG MIX 70-30) 100 unit/mL (70-30) injection by SubCUTAneous route three (3) times daily as needed (for BG  > 140 mg/dL). Uses sliding scale     fluticasone (FLONASE) 50 mcg/actuation nasal spray 2 Sprays by Both Nostrils route nightly as needed.  calcitRIOL (ROCALTROL) 0.25 mcg capsule Take 0.25 mcg by mouth every Monday, Wednesday, Friday, Saturday.  nitroglycerin (NITROSTAT) 0.3 mg SL tablet 1 Tab by SubLINGual route every five (5) minutes as needed for Chest Pain.     ergocalciferol (VITAMIN D2) 50,000 unit capsule Take 50,000 Units by mouth every Tuesday.  aspirin 81 mg tablet Take 81 mg by mouth daily.  atorvastatin (LIPITOR) 80 mg tablet Take 80 mg by mouth nightly. No current facility-administered medications for this visit. Physical Examination: General appearance - alert, well appearing, and in no distress  Mental status - alert, oriented to person, place, and time, depressed mood  Chest - clear to auscultation, no wheezes, rales or rhonchi, symmetric air entry  Heart - normal rate, regular rhythm, normal S1, S2, no murmurs, rubs, clicks or gallops    MSK - pain with ambulation using rollator  Assessment/ Plan:   Diagnoses and all orders for this visit:    1. Type 2 diabetes mellitus with stage 4 chronic kidney disease, with long-term current use of insulin ([facility])  -     AMB POC PT/INR    2. CKD (chronic kidney disease) stage 4, GFR 15-29 ml/min ([facility])  -     AMB POC PT/INR    3. Warfarin anticoagulation  -     AMB POC PT/INR  -     warfarin (COUMADIN) 2 mg tablet; 3mg PO everyday except Monday Friday take 2mg  -     warfarin (COUMADIN) 1 mg tablet; 3mg PO everyday except Monday Friday take 2mg    4. Chronic pain of right knee    5. Chronic pain syndrome  -     oxyCODONE-acetaminophen (PERCOCET) 7.5-325 mg per tablet; Take 1 Tab by mouth two (2) times daily as needed for Pain. Max Daily Amount: 2 Tabs. 6. Limited mobility    7. Medication monitoring encounter  -     DRUG SCREEN 11 W/CONF, SERUM       Follow-up Disposition:  Return in about 1 week (around 6/28/2018), or if symptoms worsen or fail to improve. I have discussed the diagnosis with the patient and the intended plan as seen in the above orders. The patient has received an after-visit summary and questions were answered concerning future plans. Pt conveyed understanding of plan. Medication Side Effects and Warnings were discussed with patient      Leroy Montanez DO       Discussed the patient's BMI with her.   The BMI follow up plan is as follows:     dietary management education, guidance, and counseling  encourage exercise  monitor weight  prescribed dietary intake    An After Visit Summary was printed and given to the patient.

## 2018-06-22 ENCOUNTER — NURSE NAVIGATOR (OUTPATIENT)
Dept: CASE MANAGEMENT | Age: 63
End: 2018-06-22

## 2018-06-22 NOTE — NURSE NAVIGATOR
Spoke with Gale at [facility] regarding Dr Delgado's request for monthly fax of Cardiomems readings to place on patient chart and review PAd trends in relation to dialysis. Confirmed fax number and Cardiomems readings through 6/22 faxed successfully to Center. Confirmed receipt of fax with Yojana Eldridge at center, [facility].

## 2018-06-23 PROBLEM — Z99.2 ESRD ON HEMODIALYSIS (HCC): Status: ACTIVE | Noted: 2018-06-23

## 2018-06-23 PROBLEM — N18.4 CKD (CHRONIC KIDNEY DISEASE) STAGE 4, GFR 15-29 ML/MIN (HCC): Chronic | Status: RESOLVED | Noted: 2017-02-10 | Resolved: 2018-06-23

## 2018-06-23 PROBLEM — N18.6 ESRD ON HEMODIALYSIS (HCC): Status: ACTIVE | Noted: 2018-06-23

## 2018-06-25 ENCOUNTER — HOME CARE VISIT (OUTPATIENT)
Dept: HOME HEALTH SERVICES | Facility: HOME HEALTH | Age: 63
End: 2018-06-25

## 2018-06-26 ENCOUNTER — HOSPITAL ENCOUNTER (EMERGENCY)
Age: 63
Discharge: HOME | End: 2018-06-26
Attending: EMERGENCY MEDICINE
Payer: MEDICARE

## 2018-06-26 ENCOUNTER — HOME CARE VISIT (OUTPATIENT)
Dept: HOME HEALTH SERVICES | Facility: HOME HEALTH | Age: 63
End: 2018-06-26

## 2018-06-26 ENCOUNTER — APPOINTMENT (OUTPATIENT)
Dept: CT IMAGING | Age: 63
End: 2018-06-26
Attending: EMERGENCY MEDICINE
Payer: MEDICARE

## 2018-06-26 VITALS
RESPIRATION RATE: 14 BRPM | DIASTOLIC BLOOD PRESSURE: 59 MMHG | SYSTOLIC BLOOD PRESSURE: 117 MMHG | BODY MASS INDEX: 41.95 KG/M2 | TEMPERATURE: 98.2 F | HEART RATE: 66 BPM | WEIGHT: 293 LBS | OXYGEN SATURATION: 97 % | HEIGHT: 70 IN

## 2018-06-26 DIAGNOSIS — R73.9 HYPERGLYCEMIA: ICD-10-CM

## 2018-06-26 DIAGNOSIS — N18.6 ESRD ON DIALYSIS (HCC): ICD-10-CM

## 2018-06-26 DIAGNOSIS — K59.01 SLOW TRANSIT CONSTIPATION: Primary | ICD-10-CM

## 2018-06-26 DIAGNOSIS — D64.9 CHRONIC ANEMIA: ICD-10-CM

## 2018-06-26 DIAGNOSIS — Z99.2 ESRD ON DIALYSIS (HCC): ICD-10-CM

## 2018-06-26 LAB
ALBUMIN SERPL-MCNC: 3.4 G/DL (ref 3.5–5)
ALBUMIN/GLOB SERPL: 0.8 {RATIO} (ref 1.1–2.2)
ALP SERPL-CCNC: 120 U/L (ref 45–117)
ALT SERPL-CCNC: 14 U/L (ref 12–78)
ANION GAP SERPL CALC-SCNC: 6 MMOL/L (ref 5–15)
AST SERPL-CCNC: 17 U/L (ref 15–37)
BASOPHILS # BLD: 0 K/UL (ref 0–0.1)
BASOPHILS NFR BLD: 0 % (ref 0–1)
BILIRUB SERPL-MCNC: 0.5 MG/DL (ref 0.2–1)
BUN SERPL-MCNC: 35 MG/DL (ref 6–20)
BUN/CREAT SERPL: 8 (ref 12–20)
CALCIUM SERPL-MCNC: 9.1 MG/DL (ref 8.5–10.1)
CHLORIDE SERPL-SCNC: 97 MMOL/L (ref 97–108)
CO2 SERPL-SCNC: 30 MMOL/L (ref 21–32)
CREAT SERPL-MCNC: 4.12 MG/DL (ref 0.55–1.02)
DIFFERENTIAL METHOD BLD: ABNORMAL
EOSINOPHIL # BLD: 0.2 K/UL (ref 0–0.4)
EOSINOPHIL NFR BLD: 1 % (ref 0–7)
ERYTHROCYTE [DISTWIDTH] IN BLOOD BY AUTOMATED COUNT: 17.6 % (ref 11.5–14.5)
GLOBULIN SER CALC-MCNC: 4.1 G/DL (ref 2–4)
GLUCOSE SERPL-MCNC: 178 MG/DL (ref 65–100)
HCT VFR BLD AUTO: 28.2 % (ref 35–47)
HGB BLD-MCNC: 8.9 G/DL (ref 11.5–16)
IMM GRANULOCYTES # BLD: 0.1 K/UL (ref 0–0.04)
IMM GRANULOCYTES NFR BLD AUTO: 1 % (ref 0–0.5)
INR PPP: 1.4 (ref 0.9–1.1)
LYMPHOCYTES # BLD: 0.9 K/UL (ref 0.8–3.5)
LYMPHOCYTES NFR BLD: 7 % (ref 12–49)
MCH RBC QN AUTO: 31.3 PG (ref 26–34)
MCHC RBC AUTO-ENTMCNC: 31.6 G/DL (ref 30–36.5)
MCV RBC AUTO: 99.3 FL (ref 80–99)
MONOCYTES # BLD: 0.7 K/UL (ref 0–1)
MONOCYTES NFR BLD: 6 % (ref 5–13)
NEUTS SEG # BLD: 10.6 K/UL (ref 1.8–8)
NEUTS SEG NFR BLD: 85 % (ref 32–75)
NRBC # BLD: 0 K/UL (ref 0–0.01)
NRBC BLD-RTO: 0 PER 100 WBC
PLATELET # BLD AUTO: 202 K/UL (ref 150–400)
PMV BLD AUTO: 10.2 FL (ref 8.9–12.9)
POTASSIUM SERPL-SCNC: 4.7 MMOL/L (ref 3.5–5.1)
PROT SERPL-MCNC: 7.5 G/DL (ref 6.4–8.2)
PROTHROMBIN TIME: 14.7 SEC (ref 9–11.1)
RBC # BLD AUTO: 2.84 M/UL (ref 3.8–5.2)
SODIUM SERPL-SCNC: 133 MMOL/L (ref 136–145)
WBC # BLD AUTO: 12.4 K/UL (ref 3.6–11)

## 2018-06-26 PROCEDURE — 99285 EMERGENCY DEPT VISIT HI MDM: CPT

## 2018-06-26 PROCEDURE — 74011250636 HC RX REV CODE- 250/636: Performed by: EMERGENCY MEDICINE

## 2018-06-26 PROCEDURE — 80053 COMPREHEN METABOLIC PANEL: CPT | Performed by: EMERGENCY MEDICINE

## 2018-06-26 PROCEDURE — 85025 COMPLETE CBC W/AUTO DIFF WBC: CPT | Performed by: EMERGENCY MEDICINE

## 2018-06-26 PROCEDURE — 85610 PROTHROMBIN TIME: CPT | Performed by: EMERGENCY MEDICINE

## 2018-06-26 PROCEDURE — 74011000250 HC RX REV CODE- 250: Performed by: EMERGENCY MEDICINE

## 2018-06-26 PROCEDURE — 74011250637 HC RX REV CODE- 250/637: Performed by: EMERGENCY MEDICINE

## 2018-06-26 PROCEDURE — 74176 CT ABD & PELVIS W/O CONTRAST: CPT

## 2018-06-26 PROCEDURE — 36415 COLL VENOUS BLD VENIPUNCTURE: CPT | Performed by: EMERGENCY MEDICINE

## 2018-06-26 PROCEDURE — 96374 THER/PROPH/DIAG INJ IV PUSH: CPT

## 2018-06-26 RX ORDER — LORAZEPAM 2 MG/ML
0.5 INJECTION INTRAMUSCULAR
Status: COMPLETED | OUTPATIENT
Start: 2018-06-26 | End: 2018-06-26

## 2018-06-26 RX ORDER — LIDOCAINE HYDROCHLORIDE 20 MG/ML
JELLY TOPICAL
Qty: 1 TUBE | Refills: 0 | Status: SHIPPED | OUTPATIENT
Start: 2018-06-26 | End: 2019-01-08 | Stop reason: ALTCHOICE

## 2018-06-26 RX ORDER — GLYCERIN ADULT
1 SUPPOSITORY, RECTAL RECTAL
Status: COMPLETED | OUTPATIENT
Start: 2018-06-26 | End: 2018-06-26

## 2018-06-26 RX ORDER — GLYCERIN ADULT
1 SUPPOSITORY, RECTAL RECTAL
Qty: 6 SUPPOSITORY | Refills: 0 | Status: SHIPPED | OUTPATIENT
Start: 2018-06-26 | End: 2018-06-26

## 2018-06-26 RX ORDER — LIDOCAINE HYDROCHLORIDE 20 MG/ML
JELLY TOPICAL
Status: COMPLETED | OUTPATIENT
Start: 2018-06-26 | End: 2018-06-26

## 2018-06-26 RX ADMIN — GLYCERIN 1 SUPPOSITORY: 2 SUPPOSITORY RECTAL at 16:00

## 2018-06-26 RX ADMIN — LIDOCAINE HYDROCHLORIDE: 20 JELLY TOPICAL at 19:17

## 2018-06-26 RX ADMIN — LORAZEPAM 0.5 MG: 2 INJECTION INTRAMUSCULAR; INTRAVENOUS at 16:52

## 2018-06-26 NOTE — LETTER
NOTIFICATION RETURN TO WORK / SCHOOL 
 
6/26/2018 6:50 PM 
 
 
To Whom It May Concern: 
 
Joseph Auguste' family member is currently under the care of [facility] EMERGENCY DEPT. She will return to work/school on: 6/29/2018 If there are questions or concerns please have the patient contact our office. Sincerely, Barb Ulloa RN

## 2018-06-26 NOTE — ED NOTES
Patient up to bedside commode twice and both times has moderate sized bowel movements. Patient reports that pain has not improved.

## 2018-06-26 NOTE — ED PROVIDER NOTES
HPI Comments: 63 y.o. female with extensive past medical history, please see list, significant for diabetes, CKD, and COPD  who presents from home via EMS with chief complaint of constipation. Pt reports she has not had a bowel movement for 2 weeks. Pt reports associated symptoms of constant severe abdominal pain that is diffuse and constant severe rectal pain. Pt reports she has tried Miralax and a couple of enemas with no relief to the constipation. Pt reports she has had multiple episodes of severe constipation before. Pt reports she has had to be admitted to the hospital for it before but denies any diagnosed cause. Pt states she has not seen a GI specialist \"in a while. \" Of note, pt is on dialysis and her last treatment was this morning. Pt states she is on O2 via NC at home. Pt is currently on Coumadin. Pt denies fever, chills, cough, congestion, chest pain, shortness of breath, a nausea, vomiting, diarrhea, difficulty with urination or dysuria. There are no other acute medical concerns at this time. Social hx - Tobacco use: former smoker, Alcohol Use: none    PCP: Jerri Pederson DO    Note written by Avanell Koyanagi, Scribe, as dictated by Sheri Kumar MD 2:53 PM.        The history is provided by the patient. No  was used.         Past Medical History:   Diagnosis Date     Sleep Apnea 2/16/2011    Aortic aneurysm (HCC)     CAD (coronary Artery Disease) Native Artery 2/16/2011    CKD (chronic kidney disease) stage 4, GFR 15-29 ml/min ([address] 2/10/2017    COPD (chronic obstructive pulmonary disease) (HCC)     Diabetic gastroparesis (HCC)     Diastolic heart failure ([address] 10/5/2012    DM type 2 causing neurological disease ([address]     DM type 2 causing renal disease ([address]     DM type 2 causing vascular disease ([address]     Esophageal stricture 2012    dialted Dr. Gavi Riggs G6PD deficiency [facility])     trait    GERD (gastroesophageal reflux disease)     Gout     ILD [facility]interstitial lung disease) [facility]     open lung bx CJW 2010    Morbid obesity ([facility]     OA (osteoarthritis)     Ovarian cancer ([facility]     cervical and uterine    Rheumatoid arteritis     S/P left pulmonary artery pressure sensor implant placement 8/31/2017    Cardiomems     Tobacco use disorder 3/21/2012    Uterine cervix cancer ([facility]     Vitamin D deficiency 8/9/2013       Past Surgical History:   Procedure Laterality Date    CARDIAC SURG PROCEDURE UNLIST      stents    COLONOSCOPY N/A 6/24/2016    COLONOSCOPY performed by Anabelle Gonzalez MD at [facility] HX APPENDECTOMY      HX CARPAL TUNNEL RELEASE      bilateral    HX CHOLECYSTECTOMY      HX HERNIA REPAIR      HX HYSTERECTOMY      HX ORTHOPAEDIC  11/12/12    right knee replacement    HX TONSIL AND ADENOIDECTOMY      UPPER GI ENDOSCOPY,VINOD W GUIDE  6/24/2016              Family History:   Problem Relation Age of Onset    Heart Disease Mother     Heart Disease Brother        Social History     Social History    Marital status:      Spouse name: N/A    Number of children: N/A    Years of education: N/A     Occupational History    Not on file. Social History Main Topics    Smoking status: Former Smoker     Packs/day: 0.50     Years: 41.00     Types: Cigarettes     Quit date: 11/9/2014    Smokeless tobacco: Never Used    Alcohol use No    Drug use: No    Sexual activity: Not on file     Other Topics Concern    Not on file     Social History Narrative         ALLERGIES: Contrast dye [iodine]; Levaquin [levofloxacin]; and Morphine    Review of Systems   Constitutional: Negative for chills and fever. HENT: Negative for ear pain and sore throat. Eyes: Negative for pain. Respiratory: Negative for chest tightness and shortness of breath. Cardiovascular: Negative for chest pain and leg swelling. Gastrointestinal: Positive for abdominal pain, constipation and rectal pain. Negative for nausea and vomiting. Genitourinary: Negative for dysuria and flank pain. Musculoskeletal: Negative for back pain. Skin: Negative for rash. Neurological: Negative for headaches. All other systems reviewed and are negative. Vitals:    06/26/18 1452 06/26/18 1500   BP: 105/52 108/51   Pulse: 69 74   Resp: 14    Temp: 98.2 °F (36.8 °C)    SpO2: 98% 97%   Weight: 137 kg (302 lb)    Height: 5' 10\" (1.778 m)             Physical Exam   Constitutional: She appears well-developed. No distress. Mild distress   HENT:   Head: Normocephalic and atraumatic. Eyes: Pupils are equal, round, and reactive to light. No scleral icterus. Neck: Normal range of motion. Neck supple. Cardiovascular: Normal rate and regular rhythm. Pulmonary/Chest: Effort normal and breath sounds normal.   Dialysis catheter to right upper chest, no surrounding erythema or warmth   Abdominal: Soft. She exhibits no distension. There is tenderness. There is no rebound and no guarding. Mild diffuse tenderness to palpation, no rebound/guarding/peritoneal signs  Rectal-mild tenderness, no mass or fluctuance, no gross blood, light brown stool, large amount of soft stool in rectum   Genitourinary: Rectum normal. Rectal exam shows no external hemorrhoid. Musculoskeletal: Normal range of motion. Neurological: She is alert. Skin: Skin is warm and dry. She is not diaphoretic. Psychiatric: She has a normal mood and affect. Her behavior is normal. Thought content normal.   Nursing note and vitals reviewed. Note written by Jonathon Patton, as dictated by Anna Marie Lewis MD 2:54 PM.  MDM  Number of Diagnoses or Management Options  Chronic anemia:   ESRD on dialysis [facility]):    Hyperglycemia:   Slow transit constipation:      Amount and/or Complexity of Data Reviewed  Clinical lab tests: ordered and reviewed  Tests in the radiology section of CPT®: ordered and reviewed  Decide to obtain previous medical records or to obtain history from someone other than the patient: yes  Obtain history from someone other than the patient: yes (family)  Review and summarize past medical records: yes  Independent visualization of images, tracings, or specimens: yes    Patient Progress  Patient progress: improved        ED Course       Procedures  PROGRESS NOTE:  7:18 PM  Pt had a moderate size bowel movement. Pt continues to complain about a lot of abdominal pain. Will order a CT of her abdomen. CT with constipation. Had BM in ED. VSS. Will d/c with outpt treatment.

## 2018-06-26 NOTE — ED NOTES
Patient stating that she \"needs something\" for pain or to \"relax her\" prior to administration of soap suds enema.

## 2018-06-26 NOTE — PROGRESS NOTES
Reason for Admission: Constipation     -5/30-6/4 [facility] Acute chest pain      RRAT Score: 34          Resources/supports as identified by patient/family:     Pt has adequate family support               Top Challenges facing patient (as identified by patient/family and CM):  Pt. CKD stage 4, transportation        Finances/Medication cost?  Pt has prescription coverage, disabled medicare and [address]  Transportation? Pt. occassionally drives, pt's spouse can provide transportation. Support system or lack thereof? Spouse Bonnie Alford [phone]  Living arrangements? Pt. lives with spouse       Self-care/ADLs/Cognition? Pt is disabled, pt needs some assit with adl's,            Current Advanced Directive/Advance Care Plan:  No      Plan for utilizing home health:  No      Likelihood of readmission: High       Transition of Care Plan:  CM met with pt at bedside, CM to follow for any discharge needs, Pt is Core measure, ACO and potential readmission. CM lives with her spouse Monie Savage [phone]. Pt daughter is also available to help and she is applying for FMLA from her work. Pt lives on story home with a few entry steps. DME to include, oxygen/inogen, nebulizer, glucometer, cane, walker, rollator, BSC  Pt reports she is in the process of obtaining a scooter and has a script, as she is having trouble ambulating long distances, but can you use her rollator to get into the car. Pt does not want a wheelchair. Pt and family given resources for [employer] companies, transportation resources. Dialysis  T/H/S [place] 6:15 am   PCP Sahara Soto  Notified Nurse Navigator       Care Management Interventions  PCP Verified by CM:  Yes  Mode of Transport at Discharge: Self  Transition of Care Consult (CM Consult): Discharge Planning  Current Support Network: Lives with Spouse  Confirm Follow Up Transport: Family  Plan discussed with Pt/Family/Caregiver: Yes  Discharge Location  Discharge Placement: Home

## 2018-06-27 ENCOUNTER — PATIENT OUTREACH (OUTPATIENT)
Dept: FAMILY MEDICINE CLINIC | Age: 63
End: 2018-06-27

## 2018-06-27 NOTE — ED NOTES
Discharge instructions given to pt. All questions answered and pt verbalized understanding. V/S stable @ time of discharge. Pt off of unit by wheelchair.

## 2018-06-27 NOTE — DISCHARGE INSTRUCTIONS
Anemia: Care Instructions  Your Care Instructions    Anemia is a low level of red blood cells, which carry oxygen throughout your body. Many things can cause anemia. Lack of iron is one of the most common causes. Your body needs iron to make hemoglobin, a substance in red blood cells that carries oxygen from the lungs to your body's cells. Without enough iron, the body produces fewer and smaller red blood cells. As a result, your body's cells do not get enough oxygen, and you feel tired and weak. And you may have trouble concentrating. Bleeding is the most common cause of a lack of iron. You may have heavy menstrual bleeding or bleeding caused by conditions such as ulcers, hemorrhoids, or cancer. Regular use of aspirin or other anti-inflammatory medicines (such as ibuprofen) also can cause bleeding in some people. A lack of iron in your diet also can cause anemia, especially at times when the body needs more iron, such as during pregnancy, infancy, and the teen years. Your doctor may have prescribed iron pills. It may take several months of treatment for your iron levels to return to normal. Your doctor also may suggest that you eat foods that are rich in iron, such as meat and beans. There are many other causes of anemia. It is not always due to a lack of iron. Finding the specific cause of your anemia will help your doctor find the right treatment for you. Follow-up care is a key part of your treatment and safety. Be sure to make and go to all appointments, and call your doctor if you are having problems. It's also a good idea to know your test results and keep a list of the medicines you take. How can you care for yourself at home? · Take your medicines exactly as prescribed. Call your doctor if you think you are having a problem with your medicine. · If your doctor recommends iron pills, take them as directed:  ¨ Try to take the pills on an empty stomach about 1 hour before or 2 hours after meals. But you may need to take iron with food to avoid an upset stomach. ¨ Do not take antacids or drink milk or caffeine drinks (such as coffee, tea, or cola) at the same time or within 2 hours of the time that you take your iron. They can make it hard for your body to absorb the iron. ¨ Vitamin C (from food or supplements) helps your body absorb iron. Try taking iron pills with a glass of orange juice or some other food that is high in vitamin C, such as citrus fruits. ¨ Iron pills may cause stomach problems, such as heartburn, nausea, diarrhea, constipation, and cramps. Be sure to drink plenty of fluids, and include fruits, vegetables, and fiber in your diet each day. Iron pills often make your bowel movements dark or green. ¨ If you forget to take an iron pill, do not take a double dose of iron the next time you take a pill. ¨ Keep iron pills out of the reach of small children. An overdose of iron can be very dangerous. · Follow your doctor's advice about eating iron-rich foods. These include red meat, shellfish, poultry, eggs, beans, raisins, whole-grain bread, and leafy green vegetables. · Steam vegetables to help them keep their iron content. When should you call for help? Call 911 anytime you think you may need emergency care. For example, call if:  ? · You have symptoms of a heart attack. These may include:  ¨ Chest pain or pressure, or a strange feeling in the chest.  ¨ Sweating. ¨ Shortness of breath. ¨ Nausea or vomiting. ¨ Pain, pressure, or a strange feeling in the back, neck, jaw, or upper belly or in one or both shoulders or arms. ¨ Lightheadedness or sudden weakness. ¨ A fast or irregular heartbeat. After you call 911, the  may tell you to chew 1 adult-strength or 2 to 4 low-dose aspirin. Wait for an ambulance. Do not try to drive yourself. ? · You passed out (lost consciousness).    ?Call your doctor now or seek immediate medical care if:  ? · You have new or increased shortness of breath. ? · You are dizzy or lightheaded, or you feel like you may faint. ? · Your fatigue and weakness continue or get worse. ? · You have any abnormal bleeding, such as:  ¨ Nosebleeds. ¨ Vaginal bleeding that is different (heavier, more frequent, at a different time of the month) than what you are used to. ¨ Bloody or black stools, or rectal bleeding. ¨ Bloody or pink urine. ? Watch closely for changes in your health, and be sure to contact your doctor if:  ? · You do not get better as expected. Where can you learn more? Go to http://lindsay-kai.info/. Enter R301 in the search box to learn more about \"Anemia: Care Instructions. \"  Current as of: October 13, 2016  Content Version: 11.4  © 4148-2535 [employer]. Care instructions adapted under license by [employer] (which disclaims liability or warranty for this information). If you have questions about a medical condition or this instruction, always ask your healthcare professional. [place] [address] any warranty or liability for your use of this information. Constipation: Care Instructions  Your Care Instructions    Constipation means that you have a hard time passing stools (bowel movements). People pass stools from 3 times a day to once every 3 days. What is normal for you may be different. Constipation may occur with pain in the rectum and cramping. The pain may get worse when you try to pass stools. Sometimes there are small amounts of bright red blood on toilet paper or the surface of stools. This is because of enlarged veins near the rectum (hemorrhoids). A few changes in your diet and lifestyle may help you avoid ongoing constipation. Your doctor may also prescribe medicine to help loosen your stool. Some medicines can cause constipation. These include pain medicines and antidepressants. Tell your doctor about all the medicines you take.  Your doctor may want to make a medicine change to ease your symptoms. Follow-up care is a key part of your treatment and safety. Be sure to make and go to all appointments, and call your doctor if you are having problems. It's also a good idea to know your test results and keep a list of the medicines you take. How can you care for yourself at home? · Drink plenty of fluids, enough so that your urine is light yellow or clear like water. If you have kidney, heart, or liver disease and have to limit fluids, talk with your doctor before you increase the amount of fluids you drink. · Include high-fiber foods in your diet each day. These include fruits, vegetables, beans, and whole grains. · Get at least 30 minutes of exercise on most days of the week. Walking is a good choice. You also may want to do other activities, such as running, swimming, cycling, or playing tennis or team sports. · Take a fiber supplement, such as Citrucel or Metamucil, every day. Read and follow all instructions on the label. · Schedule time each day for a bowel movement. A daily routine may help. Take your time having your bowel movement. · Support your feet with a small step stool when you sit on the toilet. This helps flex your hips and places your pelvis in a squatting position. · Your doctor may recommend an over-the-counter laxative to relieve your constipation. Examples are Milk of Magnesia and MiraLax. Read and follow all instructions on the label. Do not use laxatives on a long-term basis. When should you call for help? Call your doctor now or seek immediate medical care if:  ? · You have new or worse belly pain. ? · You have new or worse nausea or vomiting. ? · You have blood in your stools. ? Watch closely for changes in your health, and be sure to contact your doctor if:  ? · Your constipation is getting worse. ? · You do not get better as expected. Where can you learn more? Go to http://lindsay-kai.info/.   Enter 21 [phone] in the search box to learn more about \"Constipation: Care Instructions. \"  Current as of: March 20, 2017  Content Version: 11.4  © 9284-4724 [employer]. Care instructions adapted under license by [employer] (which disclaims liability or warranty for this information). If you have questions about a medical condition or this instruction, always ask your healthcare professional. [address] any warranty or liability for your use of this information. We hope that we have addressed all of your medical concerns. The examination and treatment you received in the Emergency Department were for an emergent problem and were not intended as complete care. It is important that you follow up with your healthcare provider(s) for ongoing care. If your symptoms worsen or do not improve as expected, and you are unable to reach your usual health care provider(s), you should return to the Emergency Department. Today's healthcare is undergoing tremendous change, and patient satisfaction surveys are one of the many tools to assess the quality of medical care. You may receive a survey from the [employer] organization regarding your experience in the Emergency Department. I hope that your experience has been completely positive, particularly the medical care that I provided. As such, please participate in the survey; anything less than excellent does not meet my expectations or intentions. [facility] and [facility] participate in nationally recognized quality of care measures. If your blood pressure is greater than 120/80, as reported below, we urge that you seek medical care to address the potential of high blood pressure, commonly known as hypertension. Hypertension can be hereditary or can be caused by certain medical conditions, pain, stress, or \"white coat syndrome. \"       Please make an appointment with your health care provider(s) for follow up of your Emergency Department visit. VITALS:   Patient Vitals for the past 8 hrs:   Temp Pulse Resp BP SpO2   06/26/18 1930 - - - 121/60 94 %   06/26/18 1839 - - - 93/72 96 %   06/26/18 1500 - 74 - 108/51 97 %   06/26/18 1452 98.2 °F (36.8 °C) 69 14 105/52 98 %          Thank you for allowing us to provide you with medical care today. We realize that you have many choices for your emergency care needs. Please choose us in the future for any continued health care needs. Ralph Webster, [address] [place] [address].   Office: [phone]            Recent Results (from the past 24 hour(s))   CBC WITH AUTOMATED DIFF    Collection Time: 06/26/18  4:10 PM   Result Value Ref Range    WBC 12.4 (H) 3.6 - 11.0 K/uL    RBC 2.84 (L) 3.80 - 5.20 M/uL    HGB 8.9 (L) 11.5 - 16.0 g/dL    HCT 28.2 (L) 35.0 - 47.0 %    MCV 99.3 (H) 80.0 - 99.0 FL    MCH 31.3 26.0 - 34.0 PG    MCHC 31.6 30.0 - 36.5 g/dL    RDW 17.6 (H) 11.5 - 14.5 %    PLATELET 351 988 - 470 K/uL    MPV 10.2 8.9 - 12.9 FL    NRBC 0.0 0  WBC    ABSOLUTE NRBC 0.00 0.00 - 0.01 K/uL    NEUTROPHILS 85 (H) 32 - 75 %    LYMPHOCYTES 7 (L) 12 - 49 %    MONOCYTES 6 5 - 13 %    EOSINOPHILS 1 0 - 7 %    BASOPHILS 0 0 - 1 %    IMMATURE GRANULOCYTES 1 (H) 0.0 - 0.5 %    ABS. NEUTROPHILS 10.6 (H) 1.8 - 8.0 K/UL    ABS. LYMPHOCYTES 0.9 0.8 - 3.5 K/UL    ABS. MONOCYTES 0.7 0.0 - 1.0 K/UL    ABS. EOSINOPHILS 0.2 0.0 - 0.4 K/UL    ABS. BASOPHILS 0.0 0.0 - 0.1 K/UL    ABS. IMM.  GRANS. 0.1 (H) 0.00 - 0.04 K/UL    DF AUTOMATED     METABOLIC PANEL, COMPREHENSIVE    Collection Time: 06/26/18  4:10 PM   Result Value Ref Range    Sodium 133 (L) 136 - 145 mmol/L    Potassium 4.7 3.5 - 5.1 mmol/L    Chloride 97 97 - 108 mmol/L    CO2 30 21 - 32 mmol/L    Anion gap 6 5 - 15 mmol/L    Glucose 178 (H) 65 - 100 mg/dL    BUN 35 (H) 6 - 20 MG/DL    Creatinine 4.12 (H) 0.55 - 1.02 MG/DL    BUN/Creatinine ratio 8 (L) 12 - 20 GFR est AA 13 (L) >60 ml/min/1.73m2    GFR est non-AA 11 (L) >60 ml/min/1.73m2    Calcium 9.1 8.5 - 10.1 MG/DL    Bilirubin, total 0.5 0.2 - 1.0 MG/DL    ALT (SGPT) 14 12 - 78 U/L    AST (SGOT) 17 15 - 37 U/L    Alk. phosphatase 120 (H) 45 - 117 U/L    Protein, total 7.5 6.4 - 8.2 g/dL    Albumin 3.4 (L) 3.5 - 5.0 g/dL    Globulin 4.1 (H) 2.0 - 4.0 g/dL    A-G Ratio 0.8 (L) 1.1 - 2.2     PROTHROMBIN TIME + INR    Collection Time: 06/26/18  4:10 PM   Result Value Ref Range    INR 1.4 (H) 0.9 - 1.1      Prothrombin time 14.7 (H) 9.0 - 11.1 sec       Ct Abd Pelv Wo Cont    Result Date: 6/26/2018  EXAM:  CT ABD PELV WO CONT INDICATION: abd pain  constipation COMPARISON: 2017 CONTRAST:  None. TECHNIQUE: Thin axial images were obtained through the abdomen and pelvis. Coronal and sagittal reconstructions were generated. Oral contrast was not administered. CT dose reduction was achieved through use of a standardized protocol tailored for this examination and automatic exposure control for dose modulation. The absence of intravenous contrast material reduces the sensitivity for evaluation of the solid parenchymal organs of the abdomen. FINDINGS: LUNG BASES: Bibasilar fibrosis. INCIDENTALLY IMAGED HEART AND MEDIASTINUM: Unremarkable. LIVER: No mass or biliary dilatation. GALLBLADDER: Cholecystectomy. SPLEEN: Calcified granulomata. PANCREAS: No mass or ductal dilatation. ADRENALS: Unremarkable. KIDNEYS/URETERS: Left renal cysts. STOMACH: Unremarkable. SMALL BOWEL: No dilatation or wall thickening. COLON: Large amount of stool in the colon. APPENDIX: Surgically removed PERITONEUM: No ascites or pneumoperitoneum. RETROPERITONEUM: No lymphadenopathy or aortic aneurysm. REPRODUCTIVE ORGANS: Surgically removed. URINARY BLADDER: No mass or calculus. BONES: No destructive bone lesion. ADDITIONAL COMMENTS: N/A     IMPRESSION: Constipation. Interstitial lung disease.

## 2018-06-27 NOTE — PROGRESS NOTES
[address]  [phone]  Fax [phone]    Patient Name: Jose M Reynoso  YOB: 1955  [facility] 5/30/18 -6/15/18 COPD and ESRD    Follow up phone call. Further chart review reveals patient was in the ED yesterday with complaints of constipation. Call to patient who states she has been constipated and was straining on the toilet when she passed out. States it scared her  and he called the ambulance. Was encouraged to use enemas and suppositories at home. Discussed Senna and Miralax. First enema without success; getting ready to use another. INR 1.4; discussed with Dr Radha Little. Will re-evaluate at PCP appt on Friday. Goals      Advance Care Planning            Will provide blue folder at next PCP appt.  Attends follow-up appointments as directed. Dr Mary Rodas (6/19) completed; Dr Radha Little 6/21 completed; HD- Levell Oz and Sat. Schedules and keeps all appts. Dr Radha Little 6/29/18 at 145pm       Increase indepedence and mobility            6/21/18 Discussed briefly. Dr Radha Little to write letter of medical necessity; provide DME companies and to call medicare for benefits. 6/25/18- 2 DME companies in the area are for medicare. Has not had success in scooter versus electric wheelchair. She does not want scooter and states that her mother's old electric wheelchair is in the shed and she does not want because it is too big to maneuver. Discussed [employer]. Will provide paperwork and application at Friday appt. Agreed to call foundation.  Knowledge and adherence of prescribed medication (ie. action, side effects, missed dose, etc.). (NTG) Will need to assess usage, administration and storage at next contact.  Patient verbalizes understanding of self-management goals of living with Congestive Heart Failure            Will need to evaluate knowledge and review at next contact. Is she following strict I&Os?  Wt outside of dialysis? Diet? 6/25- States yes she is using strict I&Os - 40 oz or less per day. States she does not need to weigh outside of dialysis. Not feeling well and has not been eating; tired and wanted to end call. Will f/u at later date. NN Plan:  F/U with patient at PCP visit on Friday 6/29/18.

## 2018-06-29 ENCOUNTER — OFFICE VISIT (OUTPATIENT)
Dept: FAMILY MEDICINE CLINIC | Age: 63
End: 2018-06-29

## 2018-06-29 ENCOUNTER — PATIENT OUTREACH (OUTPATIENT)
Dept: FAMILY MEDICINE CLINIC | Age: 63
End: 2018-06-29

## 2018-06-29 VITALS
OXYGEN SATURATION: 97 % | HEIGHT: 70 IN | TEMPERATURE: 97.5 F | WEIGHT: 293 LBS | SYSTOLIC BLOOD PRESSURE: 105 MMHG | HEART RATE: 73 BPM | RESPIRATION RATE: 22 BRPM | DIASTOLIC BLOOD PRESSURE: 64 MMHG | BODY MASS INDEX: 41.95 KG/M2

## 2018-06-29 DIAGNOSIS — Z79.01 WARFARIN ANTICOAGULATION: Primary | ICD-10-CM

## 2018-06-29 DIAGNOSIS — R53.83 FATIGUE, UNSPECIFIED TYPE: ICD-10-CM

## 2018-06-29 LAB
INR BLD: 2
PT POC: 23.4 SECONDS
VALID INTERNAL CONTROL?: YES

## 2018-06-29 NOTE — PROGRESS NOTES
Chief Complaint   Patient presents with    Circulatory problem     INR check     1. Have you been to the ER, urgent care clinic since your last visit? Hospitalized since your last visit? 2. Have you seen or consulted any other health care providers outside of the [address] [place] [address] since your last visit? Include any pap smears or colon screening.  Seen by Nephrologist on Thursday

## 2018-06-29 NOTE — PATIENT INSTRUCTIONS

## 2018-06-29 NOTE — PROGRESS NOTES
[address]  [phone]  Fax [phone]    Patient Name: Darwin Jones  YOB: 1955    F/U INR visit with Dr Zoey Jackson            6/22/18-Will provide blue folder at next PCP appt. 6/25/18 Provided blue ACP honoring choices folder.  Attends follow-up appointments as directed. Dr Santa Vu (6/19) completed; Dr Nino Duffy 6/21 completed; HD- Ahmet Guess and Sat. Schedules and keeps all appts. Dr Nino Duffy 6/29/18 at 145pm       Increase indepedence and mobility            6/21/18 Discussed briefly. Dr Nino Duffy to write letter of medical necessity; provide DME companies and to call medicare for benefits. 6/25/18- 2 DME companies in the area are for medicare. Has not had success in scooter versus electric wheelchair. She does not want scooter and states that her mother's old electric wheelchair is in the shed and she does not want because it is too big to maneuver. Discussed [employer]. Will provide paperwork and application at Friday appt. Agreed to call foundation. 6/29 Provided her and her  with the application for the foundation.  Knowledge and adherence of prescribed medication (ie. action, side effects, missed dose, etc.). (NTG) Will need to assess usage, administration and storage at next contact.  Patient verbalizes understanding of self-management goals of living with Congestive Heart Failure            Will need to evaluate knowledge and review at next contact. Is she following strict I&Os? Wt outside of dialysis? Diet? 6/25- States yes she is using strict I&Os - 40 oz or less per day. States she does not need to weigh outside of dialysis. Not feeling well and has not been eating; tired and wanted to end call. Will f/u at later date. 6/29- Feeling better that last week but still feeling zapped of energy. Discussed post dialysis fatigue.  Watching her diet both renal and CHF/fluids          NN Plan: agreed to follow up call next week after calling [employer].

## 2018-06-29 NOTE — PROGRESS NOTES
Marcial Dawson is a 63 y.o. female   Chief Complaint   Patient presents with    Coagulation disorder     INR check    pt here for recheck of her INR and currently at 2.0 which is at goal.  Will recheck in 2 weeks since she is usually relatively difficult to control. Pt is otherwise doing well except her fatigue which is likely related to her chronic medical issues. she is a 63y.o. year old female who presents for evalution. Reviewed PmHx, RxHx, FmHx, SocHx, AllgHx and updated and dated in the chart. Review of Systems - negative except as listed above in the HPI    Objective:     Vitals:    06/29/18 1334   BP: 105/64   Pulse: 73   Resp: 22   Temp: 97.5 °F (36.4 °C)   TempSrc: Oral   SpO2: 97%   Weight: 306 lb 6.4 oz (139 kg)   Height: 5' 10\" (1.778 m)       Current Outpatient Prescriptions   Medication Sig    oxyCODONE-acetaminophen (PERCOCET) 7.5-325 mg per tablet Take 1 Tab by mouth two (2) times daily as needed for Pain. Max Daily Amount: 2 Tabs.  warfarin (COUMADIN) 2 mg tablet 3mg PO everyday except Monday Friday take 2mg    warfarin (COUMADIN) 1 mg tablet 3mg PO everyday except Monday Friday take 2mg    bumetanide (BUMEX) 1 mg tablet Take 2 Tabs by mouth two (2) times a day.  hydrALAZINE (APRESOLINE) 50 mg tablet Take 1 Tab by mouth three (3) times daily.  metOLazone (ZAROXOLYN) 5 mg tablet Take 1 Tab by mouth daily.  amLODIPine (NORVASC) 10 mg tablet Take 10 mg by mouth daily.  allopurinol (ZYLOPRIM) 100 mg tablet Take 100 mg by mouth daily.  albuterol (PROAIR HFA) 90 mcg/actuation inhaler Take 2 Puffs by inhalation every four (4) hours as needed for Wheezing.     carvedilol (COREG) 25 mg tablet TAKE 1 TABLET BY MOUTH TWICE A DAY    isosorbide mononitrate ER (IMDUR) 120 mg CR tablet TAKE 1 TABLET BY MOUTH EVERY DAY    pantoprazole (PROTONIX) 40 mg tablet TAKE 1 TABLET BY MOUTH EVERY DAY FOR HEARTBURN    albuterol (PROVENTIL VENTOLIN) 2.5 mg /3 mL (0.083 %) nebulizer solution 2.5 mg by Nebulization route every four (4) hours as needed.  insulin aspart protamine/insulin aspart (NOVOLOG MIX 70-30) 100 unit/mL (70-30) injection by SubCUTAneous route three (3) times daily as needed (for BG  > 140 mg/dL). Uses sliding scale     fluticasone (FLONASE) 50 mcg/actuation nasal spray 2 Sprays by Both Nostrils route nightly as needed.  nitroglycerin (NITROSTAT) 0.3 mg SL tablet 1 Tab by SubLINGual route every five (5) minutes as needed for Chest Pain.  ergocalciferol (VITAMIN D2) 50,000 unit capsule Take 50,000 Units by mouth every Tuesday.  aspirin 81 mg tablet Take 81 mg by mouth daily.  atorvastatin (LIPITOR) 80 mg tablet Take 80 mg by mouth nightly.  lidocaine (XYLOCAINE) 2 % jelly Use TID as needed    calcitRIOL (ROCALTROL) 0.25 mcg capsule Take 0.25 mcg by mouth every Monday, Wednesday, Friday, Saturday. No current facility-administered medications for this visit. Physical Examination: General appearance - alert, well appearing, and in no distress  Chest - clear to auscultation, no wheezes, rales or rhonchi, symmetric air entry  Heart - normal rate, regular rhythm, normal S1, S2, no murmurs, rubs, clicks or gallops      Assessment/ Plan:   Diagnoses and all orders for this visit:    1. Warfarin anticoagulation  -     AMB POC PT/INR  At goal recheck 2 weeks  2. Fatigue, unspecified type     related to chronic illness  Follow-up Disposition:  Return in about 2 weeks (around 7/13/2018), or if symptoms worsen or fail to improve. I have discussed the diagnosis with the patient and the intended plan as seen in the above orders. The patient has received an after-visit summary and questions were answered concerning future plans. Pt conveyed understanding of plan.     Medication Side Effects and Warnings were discussed with patient      Lizzy Montes DO

## 2018-07-03 ENCOUNTER — NURSE NAVIGATOR (OUTPATIENT)
Dept: CASE MANAGEMENT | Age: 63
End: 2018-07-03

## 2018-07-03 LAB
AMPHETAMINES SERPL QL SCN: NEGATIVE NG/ML
BARBITURATES SERPL QL SCN: NEGATIVE UG/ML
BENZODIAZ SERPL QL SCN: NEGATIVE NG/ML
CANNABINOIDS SERPL QL SCN: NEGATIVE NG/ML
COCAINE+BZE SERPL QL SCN: NEGATIVE NG/ML
ETHANOL UR-MCNC: NEGATIVE GM/DL
METHADONE SERPL QL SCN: NEGATIVE NG/ML
OPIATES SERPL QL SCN: NEGATIVE NG/ML
OXYCOCONE: 3.3 NG/ML
OXYCODONES CONFIRMATION, 738393: POSITIVE
OXYCODONES, 738315: ABNORMAL NG/ML
OXYMORPHONE, 763902: NEGATIVE NG/ML
PCP SERPL QL SCN: NEGATIVE NG/ML
PROPOXYPH SERPL QL SCN: NEGATIVE NG/ML

## 2018-07-11 ENCOUNTER — PATIENT OUTREACH (OUTPATIENT)
Dept: FAMILY MEDICINE CLINIC | Age: 63
End: 2018-07-11

## 2018-07-11 NOTE — PROGRESS NOTES
Goals Addressed      Increase indepedence and mobility                  6/21/18 Discussed briefly. Dr Rikki Garcia to write letter of medical necessity; provide DME companies and to call medicare for benefits. 6/25/18- 2 DME companies in the area are for medicare. Has not had success in scooter versus electric wheelchair. She does not want scooter and states that her mother's old electric wheelchair is in the shed and she does not want because it is too big to maneuver. Discussed [employer]. Will provide paperwork and application at Friday appt. Agreed to call foundation. 6/29 Provided her and her  with the application for the [employer]. 7/11/18 - Spoke with the patient for update on scooter. Patient states that she has been busy and has not contacted [employer] yet. She plans to contact them early next week.  ELGIN

## 2018-07-12 ENCOUNTER — OFFICE VISIT (OUTPATIENT)
Dept: FAMILY MEDICINE CLINIC | Age: 63
End: 2018-07-12

## 2018-07-12 VITALS
TEMPERATURE: 98.5 F | WEIGHT: 293 LBS | RESPIRATION RATE: 18 BRPM | BODY MASS INDEX: 41.95 KG/M2 | DIASTOLIC BLOOD PRESSURE: 63 MMHG | SYSTOLIC BLOOD PRESSURE: 104 MMHG | OXYGEN SATURATION: 94 % | HEART RATE: 73 BPM | HEIGHT: 70 IN

## 2018-07-12 DIAGNOSIS — Z79.01 WARFARIN ANTICOAGULATION: ICD-10-CM

## 2018-07-12 DIAGNOSIS — I50.32 CHRONIC DIASTOLIC HEART FAILURE (HCC): Primary | Chronic | ICD-10-CM

## 2018-07-12 DIAGNOSIS — L02.92 BOIL: ICD-10-CM

## 2018-07-12 LAB
INR BLD: 2.2
PT POC: 25.8 SECONDS
VALID INTERNAL CONTROL?: YES

## 2018-07-12 RX ORDER — BUMETANIDE 2 MG/1
TABLET ORAL
Refills: 4 | COMMUNITY
Start: 2018-07-04 | End: 2019-01-08 | Stop reason: ALTCHOICE

## 2018-07-12 RX ORDER — WARFARIN 2 MG/1
TABLET ORAL
Qty: 30 TAB | Refills: 11 | Status: SHIPPED | OUTPATIENT
Start: 2018-07-12 | End: 2018-08-16 | Stop reason: SDUPTHER

## 2018-07-12 RX ORDER — DOXYCYCLINE 100 MG/1
100 TABLET ORAL 2 TIMES DAILY
Qty: 20 TAB | Refills: 0 | Status: SHIPPED | OUTPATIENT
Start: 2018-07-12 | End: 2018-07-22

## 2018-07-12 RX ORDER — SEVELAMER CARBONATE 800 MG/1
TABLET, FILM COATED ORAL
Refills: 3 | COMMUNITY
Start: 2018-06-28 | End: 2019-01-08 | Stop reason: ALTCHOICE

## 2018-07-12 RX ORDER — MUPIROCIN 20 MG/G
OINTMENT TOPICAL 3 TIMES DAILY
Qty: 22 G | Refills: 0 | Status: SHIPPED | OUTPATIENT
Start: 2018-07-12 | End: 2019-01-08 | Stop reason: ALTCHOICE

## 2018-07-12 RX ORDER — PREDNISONE 50 MG/1
TABLET ORAL
Refills: 0 | COMMUNITY
Start: 2018-06-28 | End: 2019-01-08 | Stop reason: ALTCHOICE

## 2018-07-12 RX ORDER — DIPHENHYDRAMINE HYDROCHLORIDE 25 MG/1
CAPSULE ORAL
Refills: 0 | Status: ON HOLD | COMMUNITY
Start: 2018-06-28 | End: 2019-01-18

## 2018-07-12 RX ORDER — LIDOCAINE AND PRILOCAINE 25; 25 MG/G; MG/G
CREAM TOPICAL
Refills: 3 | COMMUNITY
Start: 2018-07-10 | End: 2019-01-08 | Stop reason: ALTCHOICE

## 2018-07-12 NOTE — PROGRESS NOTES
David Rabago is a 63 y.o. female   Chief Complaint   Patient presents with    Coagulation disorder    pt ehre for recheck of her INR and is 2.2 currently will c/w current dose and recheck 1 month. Pt also reports a foul smelling boikl that popped under L breast which has drained. No fever no chills. Has been using warm compresses for it.      she is a 63y.o. year old female who presents for evalution. Reviewed PmHx, RxHx, FmHx, SocHx, AllgHx and updated and dated in the chart. Review of Systems - negative except as listed above in the HPI    Objective:     Vitals:    07/12/18 1417   BP: 104/63   Pulse: 73   Resp: 18   Temp: 98.5 °F (36.9 °C)   TempSrc: Oral   SpO2: 94%   Weight: 304 lb 3.2 oz (138 kg)   Height: 5' 10\" (1.778 m)       Current Outpatient Prescriptions   Medication Sig    predniSONE (DELTASONE) 50 mg tablet TAKE 1 TABLET BY MOUTH 13 HOURS,7 HOURS AND 1 HOUR PRIOR TO PROCEDURE    lidocaine-prilocaine (EMLA) topical cream APPLY TO FISTULA SITE 30 MINUTES TO 1 HOUR PRIOR TO TREATMENT    ALLERGY 25 mg capsule TAKE 2 CAPSULES BY MOUTH 1 HOUR PRIOR TO PROCEDURE    bumetanide (BUMEX) 2 mg tablet TAKE 1 TABLET BY MOUTH TWICE A DAY    doxycycline (ADOXA) 100 mg tablet Take 1 Tab by mouth two (2) times a day for 10 days.  mupirocin (BACTROBAN) 2 % ointment Apply  to affected area three (3) times daily.  warfarin (COUMADIN) 2 mg tablet 3mg PO everyday except Monday Friday take 2mg    lidocaine (XYLOCAINE) 2 % jelly Use TID as needed    oxyCODONE-acetaminophen (PERCOCET) 7.5-325 mg per tablet Take 1 Tab by mouth two (2) times daily as needed for Pain. Max Daily Amount: 2 Tabs.  warfarin (COUMADIN) 1 mg tablet 3mg PO everyday except Monday Friday take 2mg    hydrALAZINE (APRESOLINE) 50 mg tablet Take 1 Tab by mouth three (3) times daily.  metOLazone (ZAROXOLYN) 5 mg tablet Take 1 Tab by mouth daily.  amLODIPine (NORVASC) 10 mg tablet Take 10 mg by mouth daily.     allopurinol (ZYLOPRIM) 100 mg tablet Take 100 mg by mouth daily.  albuterol (PROAIR HFA) 90 mcg/actuation inhaler Take 2 Puffs by inhalation every four (4) hours as needed for Wheezing.  carvedilol (COREG) 25 mg tablet TAKE 1 TABLET BY MOUTH TWICE A DAY    isosorbide mononitrate ER (IMDUR) 120 mg CR tablet TAKE 1 TABLET BY MOUTH EVERY DAY    pantoprazole (PROTONIX) 40 mg tablet TAKE 1 TABLET BY MOUTH EVERY DAY FOR HEARTBURN    albuterol (PROVENTIL VENTOLIN) 2.5 mg /3 mL (0.083 %) nebulizer solution 2.5 mg by Nebulization route every four (4) hours as needed.  insulin aspart protamine/insulin aspart (NOVOLOG MIX 70-30) 100 unit/mL (70-30) injection by SubCUTAneous route three (3) times daily as needed (for BG  > 140 mg/dL). Uses sliding scale     fluticasone (FLONASE) 50 mcg/actuation nasal spray 2 Sprays by Both Nostrils route nightly as needed.  calcitRIOL (ROCALTROL) 0.25 mcg capsule Take 0.25 mcg by mouth every Monday, Wednesday, Friday, Saturday.  nitroglycerin (NITROSTAT) 0.3 mg SL tablet 1 Tab by SubLINGual route every five (5) minutes as needed for Chest Pain.  ergocalciferol (VITAMIN D2) 50,000 unit capsule Take 50,000 Units by mouth every Tuesday.  aspirin 81 mg tablet Take 81 mg by mouth daily.  atorvastatin (LIPITOR) 80 mg tablet Take 80 mg by mouth nightly.  sevelamer carbonate (RENVELA) 800 mg tab tab TAKE 2 TABLETS BY MOUTH WITH MEALS AND TAKE 1 TABLET BY MOUTH WITH SNACKS    bumetanide (BUMEX) 1 mg tablet Take 2 Tabs by mouth two (2) times a day. No current facility-administered medications for this visit. Physical Examination: General appearance - alert, well appearing, and in no distress  Chest - clear to auscultation, no wheezes, rales or rhonchi, symmetric air entry  Heart - normal rate, regular rhythm, normal S1, S2, no murmurs, rubs, clicks or gallops  Skin - open boil under L breast no surrounding erythema but this is foul smelling, no pus. Assessment/ Plan:   Diagnoses and all orders for this visit:    1. Chronic diastolic heart failure (HCC)  -     AMB POC PT/INR    2. Warfarin anticoagulation  -     AMB POC PT/INR  -     warfarin (COUMADIN) 2 mg tablet; 3mg PO everyday except Monday Friday take 2mg    3. Boil  -     doxycycline (ADOXA) 100 mg tablet; Take 1 Tab by mouth two (2) times a day for 10 days.  -     mupirocin (BACTROBAN) 2 % ointment; Apply  to affected area three (3) times daily. Follow-up Disposition:  Return in about 1 month (around 8/12/2018), or if symptoms worsen or fail to improve. I have discussed the diagnosis with the patient and the intended plan as seen in the above orders. The patient has received an after-visit summary and questions were answered concerning future plans. Pt conveyed understanding of plan.     Medication Side Effects and Warnings were discussed with patient      [address] [facility] [address], DO

## 2018-07-12 NOTE — PATIENT INSTRUCTIONS

## 2018-07-12 NOTE — PROGRESS NOTES
Chief Complaint   Patient presents with    Coagulation disorder     Visit Vitals    /63 (BP 1 Location: Right arm, BP Patient Position: Sitting)    Pulse 73    Temp 98.5 °F (36.9 °C) (Oral)    Resp 18    Ht 5' 10\" (1.778 m)    Wt 304 lb 3.2 oz (138 kg)    SpO2 94%    BMI 43.65 kg/m2         1. Have you been to the ER, urgent care clinic since your last visit? Hospitalized since your last visit? No    2. Have you seen or consulted any other health care providers outside of the [facility] since your last visit? Include any pap smears or colon screening. Seen a  [facility] to have her fistula cleaned out.

## 2018-07-24 ENCOUNTER — TELEPHONE (OUTPATIENT)
Dept: CARDIOLOGY CLINIC | Age: 63
End: 2018-07-24

## 2018-07-24 NOTE — TELEPHONE ENCOUNTER
Pt daughter Brie Contreras called requesting a sooner appointment with Dr. Elizabeth Damian and/or NP Crystal Guidry due to her mothers Chest Pains she has been experiencing for the last two days. Amanda Cross would also like to discuss her mothers up and down blood pressure issues. Amanda Cross Phone #[phone].   Thanks

## 2018-07-25 NOTE — TELEPHONE ENCOUNTER
Spoke with [facility]   Pt has concerns about, as she stated\"episodes\" when she has symptoms of chest tightness, head pounding, nauseous, SOB worsens and dizziness. Pt has been on dialysis 3 times a week for the last 2 months. BP today at dialysis was 123/67 but Daughter said her BP last week went up to 170/110 , and then went down to 93/57 HR 55. After the \"episode\". Pt is attempting to see Dr. Petra Hall sooner but there are no open appts at this time.     Pt  also Stated that Dialysis nurse does not feel this is due to dialysis and she should speak to her cardiologist.    Zi Heath

## 2018-07-26 ENCOUNTER — TELEPHONE (OUTPATIENT)
Dept: CARDIOLOGY CLINIC | Age: 63
End: 2018-07-26

## 2018-07-27 ENCOUNTER — CLINICAL SUPPORT (OUTPATIENT)
Dept: CARDIOLOGY CLINIC | Age: 63
End: 2018-07-27

## 2018-07-27 ENCOUNTER — TELEPHONE (OUTPATIENT)
Dept: CARDIOLOGY CLINIC | Age: 63
End: 2018-07-27

## 2018-07-27 DIAGNOSIS — I25.10 ATHEROSCLEROSIS OF NATIVE CORONARY ARTERY OF NATIVE HEART WITHOUT ANGINA PECTORIS: Chronic | ICD-10-CM

## 2018-07-27 DIAGNOSIS — I50.32 CHRONIC DIASTOLIC HEART FAILURE (HCC): Chronic | ICD-10-CM

## 2018-07-27 DIAGNOSIS — I25.10 ATHEROSCLEROSIS OF NATIVE CORONARY ARTERY OF NATIVE HEART WITHOUT ANGINA PECTORIS: Primary | Chronic | ICD-10-CM

## 2018-07-27 NOTE — TELEPHONE ENCOUNTER
Spoke to Dr. Tyler Bateman about pt's symptoms will order a event Haltor monitor. Pt notified of Haltor monitor.

## 2018-08-06 ENCOUNTER — TELEPHONE (OUTPATIENT)
Dept: CASE MANAGEMENT | Age: 63
End: 2018-08-06

## 2018-08-06 ENCOUNTER — NURSE NAVIGATOR (OUTPATIENT)
Dept: CASE MANAGEMENT | Age: 63
End: 2018-08-06

## 2018-08-07 ENCOUNTER — TELEPHONE (OUTPATIENT)
Dept: FAMILY MEDICINE CLINIC | Age: 63
End: 2018-08-07

## 2018-08-07 DIAGNOSIS — G89.4 CHRONIC PAIN SYNDROME: ICD-10-CM

## 2018-08-07 RX ORDER — OXYCODONE AND ACETAMINOPHEN 7.5; 325 MG/1; MG/1
1 TABLET ORAL
Qty: 60 TAB | Refills: 0 | Status: SHIPPED | OUTPATIENT
Start: 2018-08-07 | End: 2018-09-11 | Stop reason: SDUPTHER

## 2018-08-07 NOTE — TELEPHONE ENCOUNTER
Pt  in office to get refill for pt pain meds- states pt had her shunt moved in her arm & is in a lot pain today- states pt ripped up script given by surgeon- didn't want to breach her pain contract with our office-informed  they just needed to call - stated his wife was going by what the contract stated- script written by Dr. Benjy Arellano- given to pt  & advised to keep her appointment on 8/16- voiced they will keep appt.

## 2018-08-07 NOTE — TELEPHONE ENCOUNTER
checked and Rx for percocet written.   Will discuss with pt the pain contract at next visit and that pain medication from surgeon post op is not an issue just that there needs to be communication regarding narcotics from other MD/DO's

## 2018-08-09 ENCOUNTER — NURSE NAVIGATOR (OUTPATIENT)
Dept: CASE MANAGEMENT | Age: 63
End: 2018-08-09

## 2018-08-16 ENCOUNTER — OFFICE VISIT (OUTPATIENT)
Dept: FAMILY MEDICINE CLINIC | Age: 63
End: 2018-08-16

## 2018-08-16 VITALS
HEART RATE: 75 BPM | WEIGHT: 293 LBS | BODY MASS INDEX: 41.95 KG/M2 | DIASTOLIC BLOOD PRESSURE: 61 MMHG | RESPIRATION RATE: 18 BRPM | SYSTOLIC BLOOD PRESSURE: 99 MMHG | OXYGEN SATURATION: 100 % | TEMPERATURE: 98 F | HEIGHT: 70 IN

## 2018-08-16 DIAGNOSIS — Z79.01 WARFARIN ANTICOAGULATION: Primary | ICD-10-CM

## 2018-08-16 LAB
INR BLD: 1.6
PT POC: 19.1 SECONDS
VALID INTERNAL CONTROL?: YES

## 2018-08-16 RX ORDER — WARFARIN 1 MG/1
TABLET ORAL
Qty: 30 TAB | Refills: 11
Start: 2018-08-16 | End: 2018-08-16

## 2018-08-16 RX ORDER — WARFARIN 3 MG/1
TABLET ORAL
Qty: 90 TAB | Refills: 3 | Status: SHIPPED | OUTPATIENT
Start: 2018-08-16 | End: 2018-11-07

## 2018-08-16 RX ORDER — WARFARIN 2 MG/1
TABLET ORAL
Qty: 30 TAB | Refills: 11
Start: 2018-08-16 | End: 2018-08-16

## 2018-08-16 NOTE — PATIENT INSTRUCTIONS
Ã¯Â»Â¿  Anticoagulants: After Your Visit  Your Care Instructions  Your doctor prescribed an anticoagulant medicine. Anticoagulants, often called blood thinners, prevent new blood clots from forming and keep existing clots from getting larger. They do not actually thin the blood, but they make the blood take longer to clot. This lowers the risk of a blood clot moving to the lungs (pulmonary embolism) or moving to the brain and causing a stroke. Blood thinners come in two forms. Heparin is given by shot, either under your skin or through a needle in your vein, and starts working right away. Warfarin (Coumadin) comes in pill form and takes longer to work. Your doctor may have you begin taking both forms at the same time. As soon as the pills start to work, you will stop the shots but continue to take the pills. If you have a blood clot in your leg, you may need to take warfarin for several months. People who have heart conditions such as atrial fibrillation often need to take it for the rest of their lives. The right dose of this medicine is different for each person. You will need regular blood tests to see if your dose is correct. Follow-up care is a key part of your treatment and safety. Be sure to make and go to all appointments, and call your doctor if you are having problems. Itâs also a good idea to know your test results and keep a list of the medicines you take. How do you take blood thinners? · Take your medicines exactly as prescribed. Call your doctor if you think you are having a problem with your medicine. · Call your doctor if you are not sure what to do if you missed a dose of blood thinner. ¨ Your doctor can tell you exactly what to do so you do not take too much or too little blood thinner. Then you will be as safe as possible. · Some general rules for what to do if you miss a dose:  ¨ If you remember it in the same day, take the missed dose. Then go back to your regular schedule.   ¨ If it is the next day, or almost time to take the next dose, do not take the missed dose. Do not double the dose to make up for the missed one. At your next regularly scheduled time, take your normal dose. ¨ If you miss your dose for 2 or more days, call your doctor. · To help you stay on schedule, use a calendar to remind you when to take your blood thinner. When you take the medicine, note it on the calendar. · If you are going to give yourself shots, your doctor will give you instructions for how to safely inject the medicine. Follow the directions carefully. · Do not take any vitamins, over-the-counter medicines, or herbal products without talking to your doctor first.  · Avoid contact sports and other activities that could lead to injury. Make your home safe and take other measures to reduce your risk of falling. Always wear a seat belt while in a car. · Do not suddenly change your intake of vitamin Kârich foods, such as broccoli, cabbage, asparagus, lettuce, and spinach. This will help blood thinners work evenly from day to day. · Limit alcohol to 2 drinks a day for men and 1 drink a day for women. Alcohol may interfere with blood thinners. It also increases your risk of falls, which can cause bruising and bleeding. · Tell your dentist, pharmacist, and other health professionals that you take blood thinners. Wear medical alert jewelry that says you take blood thinners. You can buy this at most drugstores. When should you call for help? Call 911 anytime you think you may need emergency care. For example, call if:  · You cough up blood. · You vomit blood or what looks like coffee grounds. · You pass maroon or very bloody stools. Call your doctor now or seek immediate medical care if:  · You have new bruises or blood spots under your skin. · You have a nosebleed. · Your gums bleed when you brush your teeth. · You have blood in your urine.   · Your stools are black and tarlike or have streaks of blood.  · You have vaginal bleeding when you are not having your period, or heavy period bleeding. Watch closely for changes in your health, and be sure to contact your doctor if:  · You have questions about your medicine. Where can you learn more? Go to [employer]nvisible[employer]be  Enter J287 in the search box to learn more about \"Anticoagulants: After Your Visit. \"   Â© 6749-2656 [employer], [employer]. Care instructions adapted under license by Tori Hodges (which disclaims liability or warranty for this information). This care instruction is for use with your licensed healthcare professional. If you have questions about a medical condition or this instruction, always ask your healthcare professional. [place] [address] any warranty or liability for your use of this information.   Content Version: 9.2.73454; Last Revised: July 1, 2009

## 2018-08-16 NOTE — PROGRESS NOTES
Aurora Gavin is a 63 y.o. female   Chief Complaint   Patient presents with    Anticoagulation   Pt here for recheck of her INR and is currently at 1.6. Pt will take 6mg today then go to 3mg everyday. We also discussed her pain contract and that if she has surgery which she had she can use the pain medication given by surgeon but needs to communicate with me. Pt had to have revision of her dialysis access  she is a 63y.o. year old female who presents for evalution. Reviewed PmHx, RxHx, FmHx, SocHx, AllgHx and updated and dated in the chart. Review of Systems - negative except as listed above in the HPI    Objective:     Vitals:    08/16/18 1407   BP: 91/53   Pulse: 75   Resp: 18   Temp: 98 °F (36.7 °C)   TempSrc: Oral   SpO2: 100%   Weight: 308 lb (139.7 kg)   Height: 5' 10\" (1.778 m)       Current Outpatient Prescriptions   Medication Sig    warfarin (COUMADIN) 3 mg tablet 3mg PO everyday    oxyCODONE-acetaminophen (PERCOCET) 7.5-325 mg per tablet Take 1 Tab by mouth two (2) times daily as needed for Pain. Max Daily Amount: 2 Tabs.  ALLERGY 25 mg capsule TAKE 2 CAPSULES BY MOUTH 1 HOUR PRIOR TO PROCEDURE    bumetanide (BUMEX) 2 mg tablet TAKE 1 TABLET BY MOUTH TWICE A DAY    bumetanide (BUMEX) 1 mg tablet Take 2 Tabs by mouth two (2) times a day.  hydrALAZINE (APRESOLINE) 50 mg tablet Take 1 Tab by mouth three (3) times daily.  amLODIPine (NORVASC) 10 mg tablet Take 10 mg by mouth daily.  allopurinol (ZYLOPRIM) 100 mg tablet Take 100 mg by mouth daily.  albuterol (PROAIR HFA) 90 mcg/actuation inhaler Take 2 Puffs by inhalation every four (4) hours as needed for Wheezing.     carvedilol (COREG) 25 mg tablet TAKE 1 TABLET BY MOUTH TWICE A DAY    isosorbide mononitrate ER (IMDUR) 120 mg CR tablet TAKE 1 TABLET BY MOUTH EVERY DAY    pantoprazole (PROTONIX) 40 mg tablet TAKE 1 TABLET BY MOUTH EVERY DAY FOR HEARTBURN    albuterol (PROVENTIL VENTOLIN) 2.5 mg /3 mL (0.083 %) nebulizer solution 2.5 mg by Nebulization route every four (4) hours as needed.  insulin aspart protamine/insulin aspart (NOVOLOG MIX 70-30) 100 unit/mL (70-30) injection by SubCUTAneous route three (3) times daily as needed (for BG  > 140 mg/dL). Uses sliding scale     nitroglycerin (NITROSTAT) 0.3 mg SL tablet 1 Tab by SubLINGual route every five (5) minutes as needed for Chest Pain.  ergocalciferol (VITAMIN D2) 50,000 unit capsule Take 50,000 Units by mouth every Tuesday.  aspirin 81 mg tablet Take 81 mg by mouth daily.  atorvastatin (LIPITOR) 80 mg tablet Take 80 mg by mouth nightly.  sevelamer carbonate (RENVELA) 800 mg tab tab TAKE 2 TABLETS BY MOUTH WITH MEALS AND TAKE 1 TABLET BY MOUTH WITH SNACKS    predniSONE (DELTASONE) 50 mg tablet TAKE 1 TABLET BY MOUTH 13 HOURS,7 HOURS AND 1 HOUR PRIOR TO PROCEDURE    lidocaine-prilocaine (EMLA) topical cream APPLY TO FISTULA SITE 30 MINUTES TO 1 HOUR PRIOR TO TREATMENT    mupirocin (BACTROBAN) 2 % ointment Apply  to affected area three (3) times daily.  lidocaine (XYLOCAINE) 2 % jelly Use TID as needed    metOLazone (ZAROXOLYN) 5 mg tablet Take 1 Tab by mouth daily.  fluticasone (FLONASE) 50 mcg/actuation nasal spray 2 Sprays by Both Nostrils route nightly as needed.  calcitRIOL (ROCALTROL) 0.25 mcg capsule Take 0.25 mcg by mouth every Monday, Wednesday, Friday, Saturday. No current facility-administered medications for this visit. Physical Examination: General appearance - alert, well appearing, and in no distress  Chest - clear to auscultation, no wheezes, rales or rhonchi, symmetric air entry  Heart - normal rate, regular rhythm, normal S1, S2, no murmurs, rubs, clicks or gallops      Assessment/ Plan:   Diagnoses and all orders for this visit:    1.  Warfarin anticoagulation  -     AMB POC PT/INR  -     warfarin (COUMADIN) 3 mg tablet; 3mg PO everyday       Follow-up Disposition:  Return in about 2 weeks (around 8/30/2018), or if symptoms worsen or fail to improve. I have discussed the diagnosis with the patient and the intended plan as seen in the above orders. The patient has received an after-visit summary and questions were answered concerning future plans. Pt conveyed understanding of plan.     Medication Side Effects and Warnings were discussed with patient      [address] [facility] [address], DO

## 2018-08-16 NOTE — PROGRESS NOTES
Chief Complaint   Patient presents with    Anticoagulation     Patient in office today for pt/inr check. Have no concerns. 1. Have you been to the ER, urgent care clinic since your last visit? Hospitalized since your last visit? No    2. Have you seen or consulted any other health care providers outside of the [address] [place] [address] since your last visit? Include any pap smears or colon screening.  No

## 2018-08-17 ENCOUNTER — TELEPHONE (OUTPATIENT)
Dept: CASE MANAGEMENT | Age: 63
End: 2018-08-17

## 2018-08-21 ENCOUNTER — TELEPHONE (OUTPATIENT)
Dept: CASE MANAGEMENT | Age: 63
End: 2018-08-21

## 2018-08-21 NOTE — TELEPHONE ENCOUNTER
Telephone call received from patient in response to a message left with call back number. Patient called back within 30 minutes. Purpose of call was due to patient not transmitting [employer] readings for the past 2 weeks. Patient states she has been unable to lay on pillow due to having had surgery on her arm on her dialysis fistula. It has been painful to lay on the [employer] pillow to transmit a reading. Patient states it has now healed sufficiently and she will be able to begin doing daily readings again in the next day or so. Patient encouraged to resume readings.

## 2018-09-10 ENCOUNTER — NURSE NAVIGATOR (OUTPATIENT)
Dept: CASE MANAGEMENT | Age: 63
End: 2018-09-10

## 2018-09-11 ENCOUNTER — OFFICE VISIT (OUTPATIENT)
Dept: FAMILY MEDICINE CLINIC | Age: 63
End: 2018-09-11

## 2018-09-11 VITALS
HEIGHT: 70 IN | SYSTOLIC BLOOD PRESSURE: 101 MMHG | WEIGHT: 293 LBS | HEART RATE: 75 BPM | OXYGEN SATURATION: 92 % | DIASTOLIC BLOOD PRESSURE: 63 MMHG | RESPIRATION RATE: 16 BRPM | BODY MASS INDEX: 41.95 KG/M2 | TEMPERATURE: 98.3 F

## 2018-09-11 DIAGNOSIS — G89.4 CHRONIC PAIN SYNDROME: ICD-10-CM

## 2018-09-11 DIAGNOSIS — Z79.01 WARFARIN ANTICOAGULATION: Primary | ICD-10-CM

## 2018-09-11 DIAGNOSIS — K21.9 GASTROESOPHAGEAL REFLUX DISEASE, ESOPHAGITIS PRESENCE NOT SPECIFIED: ICD-10-CM

## 2018-09-11 DIAGNOSIS — K62.89 RECTAL PAIN: ICD-10-CM

## 2018-09-11 LAB
INR BLD: 2.6
PT POC: 30.8 SECONDS
VALID INTERNAL CONTROL?: YES

## 2018-09-11 RX ORDER — PANTOPRAZOLE SODIUM 40 MG/1
TABLET, DELAYED RELEASE ORAL
Qty: 90 TAB | Refills: 3 | Status: SHIPPED | OUTPATIENT
Start: 2018-09-11 | End: 2019-02-22 | Stop reason: SDUPTHER

## 2018-09-11 RX ORDER — OXYCODONE AND ACETAMINOPHEN 7.5; 325 MG/1; MG/1
1 TABLET ORAL
Qty: 60 TAB | Refills: 0 | Status: SHIPPED | OUTPATIENT
Start: 2018-09-11 | End: 2018-10-04 | Stop reason: SDUPTHER

## 2018-09-11 NOTE — PROGRESS NOTES
Brock Garcia is a 63 y.o. female    Chief Complaint   Patient presents with    Anticoagulation     INR check     Patient in office today for pt/inr check with no concerns. 1. Have you been to the ER, urgent care clinic since your last visit? Hospitalized since your last visit? No    2. Have you seen or consulted any other health care providers outside of the [facility] since your last visit? Include any pap smears or colon screening. Yes, seen by Dr. Kady Dunbar on 9/11/2018.     Visit Vitals    /63 (BP 1 Location: Left arm, BP Patient Position: Sitting)    Pulse 75    Temp 98.3 °F (36.8 °C) (Oral)    Resp 16    Ht 5' 10\" (1.778 m)    Wt 310 lb (140.6 kg)    SpO2 92%    BMI 44.48 kg/m2

## 2018-09-11 NOTE — PATIENT INSTRUCTIONS

## 2018-09-11 NOTE — PROGRESS NOTES
Luis Daniel Landry is a 63 y.o. female   Chief Complaint   Patient presents with    Anticoagulation     INR check    pt here for INR follow up and is otherwise doing relatively well. Pt coumadin level currently at 2.6 and will maintain current dosing. Pt does request a referral for GI states she is having pain in her rectum. Discussed possibly stopping coumadin and to check to see if clot is gone. Pt states she had a cousin that had stopped his coumadin after a blood clot and then had a stroke. Pt is also requesting a refill of her pain medication. Pt is using this for her leg pain from prior DVT. Pt also has chronic chest pain from angina. Without pain medication her pain level will go up to a 8-9/10. Pt is on 22.5mme. Pt does chores around the house and house keeping which the pain allows her to stay somewhat active. Pt has no hx of abuse. Opioid/Pain Management:    1. Has the patient signed a pain contract for chronic narcotic use? yes  2. Has the patient had a UDS or Serum screen as per guidelines as per [facility]?:   yes    3. Does patient meet necessary guidelines for Naloxone treatement per the [facility]?:    no  4. Has the Prescription Monitoring Program been reviewed? yes  5. Does patient have a long term condition that requires long term use of a Narcotic?  yes  6. Has patient been tolerant of therapy and responsible to routine follow up and specialist follow-up? Yes    Pt also requesting refill of her protonix which works well for her gerd. she is a 63y.o. year old female who presents for evalution. Reviewed PmHx, RxHx, FmHx, SocHx, AllgHx and updated and dated in the chart.     Review of Systems - negative except as listed above in the HPI    Objective:     Vitals:    09/11/18 1418   BP: 101/63   Pulse: 75   Resp: 16   Temp: 98.3 °F (36.8 °C)   TempSrc: Oral   SpO2: 92%   Weight: 310 lb (140.6 kg)   Height: 5' 10\" (1.778 m)       Current Outpatient Prescriptions   Medication Sig    pantoprazole (PROTONIX) 40 mg tablet TAKE 1 TABLET BY MOUTH EVERY DAY FOR HEARTBURN    oxyCODONE-acetaminophen (PERCOCET) 7.5-325 mg per tablet Take 1 Tab by mouth two (2) times daily as needed for Pain. Max Daily Amount: 2 Tabs.  warfarin (COUMADIN) 3 mg tablet 3mg PO everyday    ALLERGY 25 mg capsule TAKE 2 CAPSULES BY MOUTH 1 HOUR PRIOR TO PROCEDURE    bumetanide (BUMEX) 2 mg tablet TAKE 1 TABLET BY MOUTH TWICE A DAY    hydrALAZINE (APRESOLINE) 50 mg tablet Take 1 Tab by mouth three (3) times daily.  amLODIPine (NORVASC) 10 mg tablet Take 10 mg by mouth daily.  allopurinol (ZYLOPRIM) 100 mg tablet Take 100 mg by mouth daily.  albuterol (PROAIR HFA) 90 mcg/actuation inhaler Take 2 Puffs by inhalation every four (4) hours as needed for Wheezing.  carvedilol (COREG) 25 mg tablet TAKE 1 TABLET BY MOUTH TWICE A DAY    isosorbide mononitrate ER (IMDUR) 120 mg CR tablet TAKE 1 TABLET BY MOUTH EVERY DAY    albuterol (PROVENTIL VENTOLIN) 2.5 mg /3 mL (0.083 %) nebulizer solution 2.5 mg by Nebulization route every four (4) hours as needed.  insulin aspart protamine/insulin aspart (NOVOLOG MIX 70-30) 100 unit/mL (70-30) injection by SubCUTAneous route three (3) times daily as needed (for BG  > 140 mg/dL). Uses sliding scale     fluticasone (FLONASE) 50 mcg/actuation nasal spray 2 Sprays by Both Nostrils route nightly as needed.  nitroglycerin (NITROSTAT) 0.3 mg SL tablet 1 Tab by SubLINGual route every five (5) minutes as needed for Chest Pain.  ergocalciferol (VITAMIN D2) 50,000 unit capsule Take 50,000 Units by mouth every Tuesday.  aspirin 81 mg tablet Take 81 mg by mouth daily.  atorvastatin (LIPITOR) 80 mg tablet Take 80 mg by mouth nightly.     sevelamer carbonate (RENVELA) 800 mg tab tab TAKE 2 TABLETS BY MOUTH WITH MEALS AND TAKE 1 TABLET BY MOUTH WITH SNACKS    predniSONE (DELTASONE) 50 mg tablet TAKE 1 TABLET BY MOUTH 13 HOURS,7 HOURS AND 1 HOUR PRIOR TO PROCEDURE    lidocaine-prilocaine (EMLA) topical cream APPLY TO FISTULA SITE 30 MINUTES TO 1 HOUR PRIOR TO TREATMENT    mupirocin (BACTROBAN) 2 % ointment Apply  to affected area three (3) times daily. (Patient not taking: Reported on 9/11/2018)    lidocaine (XYLOCAINE) 2 % jelly Use TID as needed (Patient not taking: Reported on 9/11/2018)    bumetanide (BUMEX) 1 mg tablet Take 2 Tabs by mouth two (2) times a day. (Patient not taking: Reported on 9/11/2018)    metOLazone (ZAROXOLYN) 5 mg tablet Take 1 Tab by mouth daily. (Patient not taking: Reported on 9/11/2018)    calcitRIOL (ROCALTROL) 0.25 mcg capsule Take 0.25 mcg by mouth every Monday, Wednesday, Friday, Saturday. No current facility-administered medications for this visit. Physical Examination: General appearance - alert, well appearing, and in no distress and chronically ill appearing  Mental status - alert, oriented to person, place, and time  Chest - clear to auscultation, no wheezes, rales or rhonchi, symmetric air entry  Heart - normal rate, regular rhythm, normal S1, S2, no murmurs, rubs, clicks or gallops  Musculoskeletal - chronic R calf ttp      Assessment/ Plan:   Diagnoses and all orders for this visit:    1. Warfarin anticoagulation  -     AMB POC PT/INR  C/w current 3mg daily  2. Rectal pain  -     [facility]    3. Gastroesophageal reflux disease, esophagitis presence not specified  -     [facility]  -     pantoprazole (PROTONIX) 40 mg tablet; TAKE 1 TABLET BY MOUTH EVERY DAY FOR HEARTBURN    4. Chronic pain syndrome  -     oxyCODONE-acetaminophen (PERCOCET) 7.5-325 mg per tablet; Take 1 Tab by mouth two (2) times daily as needed for Pain. Max Daily Amount: 2 Tabs. Follow-up Disposition:  Return in about 4 weeks (around 10/9/2018), or if symptoms worsen or fail to improve. I have discussed the diagnosis with the patient and the intended plan as seen in the above orders.   The patient has received an after-visit summary and questions were answered concerning future plans. Pt conveyed understanding of plan.     Medication Side Effects and Warnings were discussed with patient      Sari Joyner, DO

## 2018-09-20 ENCOUNTER — HOSPITAL ENCOUNTER (OUTPATIENT)
Dept: VASCULAR SURGERY | Age: 63
Discharge: HOME | End: 2018-09-20
Attending: FAMILY MEDICINE
Payer: MEDICARE

## 2018-09-20 ENCOUNTER — OFFICE VISIT (OUTPATIENT)
Dept: FAMILY MEDICINE CLINIC | Age: 63
End: 2018-09-20

## 2018-09-20 VITALS
HEIGHT: 70 IN | OXYGEN SATURATION: 91 % | HEART RATE: 68 BPM | TEMPERATURE: 98 F | BODY MASS INDEX: 41.95 KG/M2 | SYSTOLIC BLOOD PRESSURE: 96 MMHG | WEIGHT: 293 LBS | DIASTOLIC BLOOD PRESSURE: 56 MMHG | RESPIRATION RATE: 20 BRPM

## 2018-09-20 DIAGNOSIS — M79.605 BILATERAL LEG PAIN: Primary | ICD-10-CM

## 2018-09-20 DIAGNOSIS — M79.605 BILATERAL LEG PAIN: ICD-10-CM

## 2018-09-20 DIAGNOSIS — M79.604 BILATERAL LEG PAIN: ICD-10-CM

## 2018-09-20 DIAGNOSIS — M79.604 BILATERAL LEG PAIN: Primary | ICD-10-CM

## 2018-09-20 PROCEDURE — 93970 EXTREMITY STUDY: CPT

## 2018-09-20 RX ORDER — LIDOCAINE 50 MG/G
OINTMENT TOPICAL AS NEEDED
Qty: 60 G | Refills: 2 | Status: ON HOLD | OUTPATIENT
Start: 2018-09-20 | End: 2019-01-18 | Stop reason: ALTCHOICE

## 2018-09-20 NOTE — PROGRESS NOTES
Chief Complaint   Patient presents with    Fall     x 2, \"1 week ago at home and \"Sunday in the [facility] parking lot\"    Leg Pain     right     1. Have you been to the ER, urgent care clinic since your last visit? Hospitalized since your last visit? No    2. Have you seen or consulted any other health care providers outside of the [facility] since your last visit? Include any pap smears or colon screening.  [facility] by [facility],    Visit Vitals    BP 96/56 (BP 1 Location: Right arm, BP Patient Position: Sitting)    Pulse 68    Temp 98 °F (36.7 °C) (Oral)    Resp 20    Ht 5' 10\" (1.778 m)    Wt 309 lb (140.2 kg)    SpO2 91%    BMI 44.34 kg/m2

## 2018-09-20 NOTE — PATIENT INSTRUCTIONS
Leg Pain: Care Instructions  Your Care Instructions  Many things can cause leg pain. Too much exercise or overuse can cause a muscle cramp (or charley horse). You can get leg cramps from not eating a balanced diet that has enough potassium, calcium, and other minerals. If you do not drink enough fluids or are taking certain medicines, you may develop leg cramps. Other causes of leg pain include injuries, blood flow problems, nerve damage, and twisted and enlarged veins (varicose veins). You can usually ease pain with self-care. Your doctor may recommend that you rest your leg and keep it elevated. Follow-up care is a key part of your treatment and safety. Be sure to make and go to all appointments, and call your doctor if you are having problems. It's also a good idea to know your test results and keep a list of the medicines you take. How can you care for yourself at home? · Take pain medicines exactly as directed. ¨ If the doctor gave you a prescription medicine for pain, take it as prescribed. ¨ If you are not taking a prescription pain medicine, ask your doctor if you can take an over-the-counter medicine. · Take any other medicines exactly as prescribed. Call your doctor if you think you are having a problem with your medicine. · Rest your leg while you have pain, and avoid standing for long periods of time. · Prop up your leg at or above the level of your heart when possible. · Make sure you are eating a balanced diet that is rich in calcium, potassium, and magnesium, especially if you are pregnant. · If directed by your doctor, put ice or a cold pack on the area for 10 to 20 minutes at a time. Put a thin cloth between the ice and your skin. · Your leg may be in a splint, a brace, or an elastic bandage, and you may have crutches to help you walk. Follow your doctor's directions about how long to wear supports and how to use the crutches. When should you call for help?   Call 911 anytime you think you may need emergency care. For example, call if:    · You have sudden chest pain and shortness of breath, or you cough up blood.     · Your leg is cool or pale or changes color.    Call your doctor now or seek immediate medical care if:    · You have increasing or severe pain.     · Your leg suddenly feels weak and you cannot move it.     · You have signs of a blood clot, such as:  ¨ Pain in your calf, back of the knee, thigh, or groin. ¨ Redness and swelling in your leg or groin.     · You have signs of infection, such as:  ¨ Increased pain, swelling, warmth, or redness. ¨ Red streaks leading from the sore area. ¨ Pus draining from a place on your leg. ¨ A fever.     · You cannot bear weight on your leg.    Watch closely for changes in your health, and be sure to contact your doctor if:    · You do not get better as expected. Where can you learn more? Go to http://lindsay-kai.info/. Enter X916 in the search box to learn more about \"Leg Pain: Care Instructions. \"  Current as of: November 20, 2017  Content Version: 11.7  © 5743-7013 [employer]. Care instructions adapted under license by [employer] (which disclaims liability or warranty for this information). If you have questions about a medical condition or this instruction, always ask your healthcare professional. [address] any warranty or liability for your use of this information.

## 2018-09-20 NOTE — PROCEDURES
[facility]  *** FINAL REPORT ***    Name: Jonah Martin  MRN: [id number]    Outpatient  : 14 Dec 1955  HIS Order #: [id number]  [id number] [facility] Visit #: [id number]  Date: 20 Sep 2018    TYPE OF TEST: Peripheral Venous Testing    REASON FOR TEST  Pain in limb, Limb swelling    Right Leg:-  Deep venous thrombosis:           No  Superficial venous thrombosis:    No  Deep venous insufficiency:        No  Superficial venous insufficiency: Yes    Left Leg:-  Deep venous thrombosis:           No  Superficial venous thrombosis:    No  Deep venous insufficiency:        No  Superficial venous insufficiency: No      INTERPRETATION/FINDINGS  PROCEDURE:  BILATERAL LE VENOUS DUPLEX. Evaluation of lower extremity veins with ultrasound (B-mode imaging,  pulsed Doppler, color Doppler). Includes the common femoral, deep  femoral, femoral, popliteal, posterior tibial, peroneal, and great  saphenous veins. FINDINGS:  Marissa Paddy scale and color flow duplex images of the veins in  both lower extremities demonstrate normal compressibility, spontaneous   and augmented flow profiles, and absence of filling defects  throughout the deep and superficial veins in both lower extremities. CONCLUSION: Technically dififcult study due to patient body habitus,  limb swelling , and patient unable to tolerare probe pressure. Bilateral lower extremity venous duplex negative for deep venous  thrombosis or thrombophlebitis. ADDITIONAL COMMENTS    I have personally reviewed the data relevant to the interpretation of  this  study.     TECHNOLOGIST: Hortencia Orozco  Signed: 2018 03:28 PM    PHYSICIAN: Muriel Lewis MD  Signed: 2018 03:46 PM

## 2018-09-20 NOTE — PROGRESS NOTES
Angela Lewis is a 63 y.o. female   Chief Complaint   Patient presents with    Fall     x 2, \"1 week ago at home and \"Sunday in the [address] [facility] parking lot\"    Leg Pain     right    pt states she has R leg pain in her calf and this has been since she had a fall at home and then again at [facility]. Pt does have a rollator but has felt she has not been needing it. Pt is going to start using this. Pt did not go to ED or urgent care after either falls. Pain began the next day. Pt states her pain meds are not really taking care of it. Pt advised she can increase to tid for the short term for next week but not beyond. Pt will follow up early for her pain med refill along with INR check. she is a 63y.o. year old female who presents for evalution. Reviewed PmHx, RxHx, FmHx, SocHx, AllgHx and updated and dated in the chart. Review of Systems - negative except as listed above in the HPI    Objective:     Vitals:    09/20/18 1153   BP: 96/56   Pulse: 68   Resp: 20   Temp: 98 °F (36.7 °C)   TempSrc: Oral   SpO2: 91%   Weight: 309 lb (140.2 kg)   Height: 5' 10\" (1.778 m)       Current Outpatient Prescriptions   Medication Sig    lidocaine (XYLOCAINE) 5 % ointment Apply  to affected area as needed for Pain.  pantoprazole (PROTONIX) 40 mg tablet TAKE 1 TABLET BY MOUTH EVERY DAY FOR HEARTBURN    oxyCODONE-acetaminophen (PERCOCET) 7.5-325 mg per tablet Take 1 Tab by mouth two (2) times daily as needed for Pain. Max Daily Amount: 2 Tabs.  warfarin (COUMADIN) 3 mg tablet 3mg PO everyday    predniSONE (DELTASONE) 50 mg tablet TAKE 1 TABLET BY MOUTH 13 HOURS,7 HOURS AND 1 HOUR PRIOR TO PROCEDURE    ALLERGY 25 mg capsule TAKE 2 CAPSULES BY MOUTH 1 HOUR PRIOR TO PROCEDURE    hydrALAZINE (APRESOLINE) 50 mg tablet Take 1 Tab by mouth three (3) times daily. (Patient taking differently: Take 25 mg by mouth two (2) times a day.)    allopurinol (ZYLOPRIM) 100 mg tablet Take 100 mg by mouth daily.     albuterol (PROAIR HFA) 90 mcg/actuation inhaler Take 2 Puffs by inhalation every four (4) hours as needed for Wheezing.  carvedilol (COREG) 25 mg tablet TAKE 1 TABLET BY MOUTH TWICE A DAY    isosorbide mononitrate ER (IMDUR) 120 mg CR tablet TAKE 1 TABLET BY MOUTH EVERY DAY    albuterol (PROVENTIL VENTOLIN) 2.5 mg /3 mL (0.083 %) nebulizer solution 2.5 mg by Nebulization route every four (4) hours as needed.  insulin aspart protamine/insulin aspart (NOVOLOG MIX 70-30) 100 unit/mL (70-30) injection by SubCUTAneous route three (3) times daily as needed (for BG  > 140 mg/dL). Uses sliding scale     fluticasone (FLONASE) 50 mcg/actuation nasal spray 2 Sprays by Both Nostrils route nightly as needed.  nitroglycerin (NITROSTAT) 0.3 mg SL tablet 1 Tab by SubLINGual route every five (5) minutes as needed for Chest Pain.  ergocalciferol (VITAMIN D2) 50,000 unit capsule Take 50,000 Units by mouth every Tuesday.  aspirin 81 mg tablet Take 81 mg by mouth daily.  atorvastatin (LIPITOR) 80 mg tablet Take 80 mg by mouth nightly.  sevelamer carbonate (RENVELA) 800 mg tab tab TAKE 2 TABLETS BY MOUTH WITH MEALS AND TAKE 1 TABLET BY MOUTH WITH SNACKS    lidocaine-prilocaine (EMLA) topical cream APPLY TO FISTULA SITE 30 MINUTES TO 1 HOUR PRIOR TO TREATMENT    bumetanide (BUMEX) 2 mg tablet TAKE 1 TABLET BY MOUTH TWICE A DAY    mupirocin (BACTROBAN) 2 % ointment Apply  to affected area three (3) times daily. (Patient not taking: Reported on 9/11/2018)    lidocaine (XYLOCAINE) 2 % jelly Use TID as needed (Patient not taking: Reported on 9/11/2018)    bumetanide (BUMEX) 1 mg tablet Take 2 Tabs by mouth two (2) times a day. (Patient not taking: Reported on 9/11/2018)    metOLazone (ZAROXOLYN) 5 mg tablet Take 1 Tab by mouth daily. (Patient not taking: Reported on 9/11/2018)    amLODIPine (NORVASC) 10 mg tablet Take 10 mg by mouth daily.     calcitRIOL (ROCALTROL) 0.25 mcg capsule Take 0.25 mcg by mouth every Monday, Wednesday, Friday, Saturday. No current facility-administered medications for this visit. Physical Examination: General appearance - alert, well appearing, and in no distress  Chest - clear to auscultation, no wheezes, rales or rhonchi, symmetric air entry  Heart - normal rate, regular rhythm, normal S1, S2, no murmurs, rubs, clicks or gallops  Extremities - R calf ttp mild on L, ropy texture on       Assessment/ Plan:   Diagnoses and all orders for this visit:    1. Bilateral leg pain  -     DUPLEX LOWER EXT VENOUS BILAT; Future  -     lidocaine (XYLOCAINE) 5 % ointment; Apply  to affected area as needed for Pain. Follow-up Disposition:  Return if symptoms worsen or fail to improve. I have discussed the diagnosis with the patient and the intended plan as seen in the above orders. The patient has received an after-visit summary and questions were answered concerning future plans. Pt conveyed understanding of plan.     Medication Side Effects and Warnings were discussed with patient      Nelda Gallegos, DO

## 2018-09-21 ENCOUNTER — OFFICE VISIT (OUTPATIENT)
Dept: CARDIOLOGY CLINIC | Age: 63
End: 2018-09-21

## 2018-09-21 VITALS
HEIGHT: 70 IN | OXYGEN SATURATION: 93 % | WEIGHT: 293 LBS | HEART RATE: 78 BPM | BODY MASS INDEX: 41.95 KG/M2 | DIASTOLIC BLOOD PRESSURE: 50 MMHG | SYSTOLIC BLOOD PRESSURE: 110 MMHG

## 2018-09-21 DIAGNOSIS — D64.9 NORMOCYTIC ANEMIA: Chronic | ICD-10-CM

## 2018-09-21 DIAGNOSIS — Z79.4 TYPE 2 DIABETES MELLITUS WITH STAGE 4 CHRONIC KIDNEY DISEASE, WITH LONG-TERM CURRENT USE OF INSULIN (HCC): ICD-10-CM

## 2018-09-21 DIAGNOSIS — Z86.718 HX OF DEEP VENOUS THROMBOSIS: ICD-10-CM

## 2018-09-21 DIAGNOSIS — G47.33 OBSTRUCTIVE SLEEP APNEA SYNDROME: ICD-10-CM

## 2018-09-21 DIAGNOSIS — I10 ESSENTIAL HYPERTENSION: ICD-10-CM

## 2018-09-21 DIAGNOSIS — E11.22 TYPE 2 DIABETES MELLITUS WITH STAGE 4 CHRONIC KIDNEY DISEASE, WITH LONG-TERM CURRENT USE OF INSULIN (HCC): ICD-10-CM

## 2018-09-21 DIAGNOSIS — E66.01 MORBID OBESITY (HCC): Chronic | ICD-10-CM

## 2018-09-21 DIAGNOSIS — J44.9 COPD, SEVERE (HCC): ICD-10-CM

## 2018-09-21 DIAGNOSIS — I50.30 (HFPEF) HEART FAILURE WITH PRESERVED EJECTION FRACTION (HCC): Primary | ICD-10-CM

## 2018-09-21 DIAGNOSIS — N18.4 TYPE 2 DIABETES MELLITUS WITH STAGE 4 CHRONIC KIDNEY DISEASE, WITH LONG-TERM CURRENT USE OF INSULIN (HCC): ICD-10-CM

## 2018-09-21 DIAGNOSIS — J84.9 ILD (INTERSTITIAL LUNG DISEASE) (HCC): ICD-10-CM

## 2018-09-21 DIAGNOSIS — I20.9 ANGINA, CLASS III (HCC): ICD-10-CM

## 2018-09-21 DIAGNOSIS — N18.6 ESRD ON HEMODIALYSIS (HCC): ICD-10-CM

## 2018-09-21 DIAGNOSIS — Z99.2 ESRD ON HEMODIALYSIS (HCC): ICD-10-CM

## 2018-09-21 DIAGNOSIS — Z99.89 OSA ON CPAP: ICD-10-CM

## 2018-09-21 DIAGNOSIS — G47.33 OSA ON CPAP: ICD-10-CM

## 2018-09-21 DIAGNOSIS — I27.20 PULMONARY HTN (HCC): ICD-10-CM

## 2018-09-21 DIAGNOSIS — I25.118 ATHEROSCLEROSIS OF NATIVE CORONARY ARTERY OF NATIVE HEART WITH STABLE ANGINA PECTORIS (HCC): Chronic | ICD-10-CM

## 2018-09-21 DIAGNOSIS — Z79.01 WARFARIN ANTICOAGULATION: ICD-10-CM

## 2018-09-21 NOTE — PROGRESS NOTES
Nora Lucero, [address]  [address],  [address]  [place], [address] [facility]    Office [phone]  Fax [phone]  Cell [phone]        Gayle Simons is a 63 y.o. female. Last seen 3 months ago. Assessment  Encounter Diagnoses   Name Primary?  Atherosclerosis of native coronary artery of native heart with stable angina pectoris (HCC)     Angina, class III (HCC)     Hx of deep venous thrombosis     Type 2 diabetes mellitus with stage 4 chronic kidney disease, with long-term current use of insulin (HCC)     Essential hypertension     JASEN on CPAP     Pulmonary HTN     (HFpEF) heart failure with preserved ejection fraction (HCC) Yes    Morbid obesity     Normocytic anemia     COPD, severe (HCC)     ILD (interstitial lung disease) (HCC)     Warfarin anticoagulation     Obstructive sleep apnea syndrome     ESRD on hemodialysis ([facility] [address]        Recommendations:  1. HFpEF, class 3 with moderate to severe PA HTN, s/p cardioMEMS. PAD over the past week has been in the 26-30 range, most recently 26 on 9/19. Volume is being managed by dialysis. She is no longer on diuretic therapy. 2. Chronic lung disease, oxygen dependent. 3. HTN, low-normal BPs despite dose reduction in Hydralazine. Will discontinue Hydralazine completely. 4. CAD, RCA  by cath 2014. Lexiscan cardiolite 2016 normal. She has atypical chest pain that I doubt is angina. Nevertheless she is on optimal medical therapy. Right radial artery cath was attempted recently without success. We discussed repeat stress imaging but will hold off for now     5. Symptomatic PVCs. Increase Coreg to 37.5 mg BID. 6. 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 T2DM managed by Rikki Murray DO    8. Morbid Obesity    9. Chronic anticoagulation due to previous DVT. Continue Coumadin. INR being followed by Dr. Rubens Lucas. Follow-up Disposition:  Return in about 3 months (around 12/21/2018). Subjective:  Ms. Karma Carbone reports increased frequency of skipped heart beats/racing heart beats, which occurs ~daily for a few minutes then resolve. They occur with walking or at rest. She has not noticed any instigating factors. She had a loop recorder. She also reports continued shortness of breath, and is unable to walk around without use of oxygen. She further states that occasionally when she is walking she develops chest tightness. Symptoms do not always occur. She is adherent with Isosorbide 120 mg/day. She states her blood pressure is intermittently low. Recurrent left arm AV fistula issues noted. She has been getting dialysis through left chest port. Patient denies any syncope, orthopnea, or paroxysmal nocturnal dyspnea. She is here today with her . Cardiac risk factors   HTN yes  DM yes    Cardiac testing  CATH: 2004: 1v CAD by cath - PCI OM with SLA  CATH 5/2004 - patent stent, no new disease   Lexiscan cardiolite 12/09- normal perfusion, EF 66%  ECHO 11/2009: LVH, EF 05%, diastolic dysfunction  STRESS/LEXISCAN/CARDIOLITE 3/29/11: normal perfusion, EF 62%  CATH: 3/20/2012 :PA 55/30 mean 41, wedge 26, RA 24, EDP 35, LVG 55%, LM normal, LAD normal, LCX - OM1 patent stent, OM2 prox 85% long, % w/ L -> R collateral; s/p SAL-> OM2/OM3 with SAL. CATH: 10/4/2012: EDP 25, EF 55%, LM nl, LAD mild plaque, LCX patent OM stents, % prox with good L to R collaterals. Cath 3/18/2014 - RA 9 PA 42/19/29, PCW 12, EDP 25, no LVG due to CKD, LM nl, LAD mild plaque, LCX patent stents OM1/OM2, RCA chronic proximal occlusion with L to R collaterals. ECHO 4/11/16: EF 60-65%. No WMA. LA mildly dilated. Lexiscan Cardiolite 4/11/16: Normal. LVEF 55%. Compared to 3/29/11, no significant changes. RHC/CardioMEMS implant 8/31/17 - RA 12, PA 56/20/30, CI (Mayte) 2.65    Echo 11/30/17 - EF 65%. G1DD. No WMA. Wall thickness was mildly to moderately increased.    Limited Echo 12/13/17 - Tricuspid valve: Pulmonary artery diastolic pressure: 69.06 mmHg. [address] 6/5/18 Moderate-severe PA HTN, post capillary  Marked elevation in biV filling pressures    Event Monitor in 8/2018 demonstrated frequent PVCs    Past Medical History:   Diagnosis Date     Sleep Apnea 2/16/2011    Aortic aneurysm (HCC)     CAD (coronary Artery Disease) Native Artery 2/16/2011    CKD (chronic kidney disease) stage 4, GFR 15-29 ml/min ([facility]) 2/10/2017    COPD (chronic obstructive pulmonary disease) ([facility])     Diabetic gastroparesis ([facility])     Diastolic heart failure ([address] 10/5/2012    DM type 2 causing neurological disease ([address]     DM type 2 causing renal disease ([address]     DM type 2 causing vascular disease ([address]     Esophageal stricture 2012    dialted Dr. Gavi Riggs G6PD deficiency [facility])     trait    GERD (gastroesophageal reflux disease)     Gout     ILD (interstitial lung disease) ([address]     open lung bx CJW 2010    Morbid obesity ([address]     OA (osteoarthritis)     Ovarian cancer ([address]     cervical and uterine    Rheumatoid arteritis     S/P left pulmonary artery pressure sensor implant placement 8/31/2017    [facility]     Tobacco use disorder 3/21/2012    Uterine cervix cancer ([address]     Vitamin D deficiency 8/9/2013        Current Outpatient Prescriptions   Medication Sig Dispense Refill    nitroglycerin (NITROSTAT) 0.3 mg SL tablet 1 Tab by SubLINGual route every five (5) minutes as needed for Chest Pain. 1 Bottle 1    carvedilol (COREG) 25 mg tablet Take 1.5 Tabs by mouth two (2) times daily (with meals). 180 Tab 3    pantoprazole (PROTONIX) 40 mg tablet TAKE 1 TABLET BY MOUTH EVERY DAY FOR HEARTBURN 90 Tab 3    oxyCODONE-acetaminophen (PERCOCET) 7.5-325 mg per tablet Take 1 Tab by mouth two (2) times daily as needed for Pain. Max Daily Amount: 2 Tabs. 60 Tab 0    warfarin (COUMADIN) 3 mg tablet 3mg PO everyday 90 Tab 3    amLODIPine (NORVASC) 10 mg tablet Take 10 mg by mouth daily.       allopurinol (ZYLOPRIM) 100 mg tablet Take 100 mg by mouth daily.  albuterol (PROAIR HFA) 90 mcg/actuation inhaler Take 2 Puffs by inhalation every four (4) hours as needed for Wheezing.  isosorbide mononitrate ER (IMDUR) 120 mg CR tablet TAKE 1 TABLET BY MOUTH EVERY DAY 90 Tab 3    albuterol (PROVENTIL VENTOLIN) 2.5 mg /3 mL (0.083 %) nebulizer solution 2.5 mg by Nebulization route every four (4) hours as needed.  insulin aspart protamine/insulin aspart (NOVOLOG MIX 70-30) 100 unit/mL (70-30) injection by SubCUTAneous route three (3) times daily as needed (for BG  > 140 mg/dL). Uses sliding scale       ergocalciferol (VITAMIN D2) 50,000 unit capsule Take 50,000 Units by mouth every Tuesday.  aspirin 81 mg tablet Take 81 mg by mouth daily.  atorvastatin (LIPITOR) 80 mg tablet Take 80 mg by mouth nightly.  lidocaine (XYLOCAINE) 5 % ointment Apply  to affected area as needed for Pain. 60 g 2    sevelamer carbonate (RENVELA) 800 mg tab tab TAKE 2 TABLETS BY MOUTH WITH MEALS AND TAKE 1 TABLET BY MOUTH WITH SNACKS  3    predniSONE (DELTASONE) 50 mg tablet TAKE 1 TABLET BY MOUTH 13 HOURS,7 HOURS AND 1 HOUR PRIOR TO PROCEDURE  0    lidocaine-prilocaine (EMLA) topical cream APPLY TO FISTULA SITE 30 MINUTES TO 1 HOUR PRIOR TO TREATMENT  3    ALLERGY 25 mg capsule TAKE 2 CAPSULES BY MOUTH 1 HOUR PRIOR TO PROCEDURE  0    bumetanide (BUMEX) 2 mg tablet TAKE 1 TABLET BY MOUTH TWICE A DAY  4    mupirocin (BACTROBAN) 2 % ointment Apply  to affected area three (3) times daily. (Patient not taking: Reported on 9/11/2018) 22 g 0    lidocaine (XYLOCAINE) 2 % jelly Use TID as needed (Patient not taking: Reported on 9/11/2018) 1 Tube 0    bumetanide (BUMEX) 1 mg tablet Take 2 Tabs by mouth two (2) times a day. (Patient not taking: Reported on 9/11/2018) 30 Tab 0    metOLazone (ZAROXOLYN) 5 mg tablet Take 1 Tab by mouth daily.  (Patient not taking: Reported on 9/11/2018) 30 Tab 1    fluticasone (FLONASE) 50 mcg/actuation nasal spray 2 Sprays by Both Nostrils route nightly as needed.  calcitRIOL (ROCALTROL) 0.25 mcg capsule Take 0.25 mcg by mouth every Monday, Wednesday, Friday, Saturday. Allergies   Allergen Reactions    Contrast Dye [Iodine] Anaphylaxis     Tolerates when pre medicated w/ IV solumedrol and benadryl prior to procedure     Levaquin [Levofloxacin] Nausea and Vomiting    Morphine Hives and Itching      Review of Systems  Constitutional: Negative for fever, chills, malaise/fatigue and diaphoresis. Respiratory: Negative for cough, hemoptysis, sputum production, and wheezing. +SOB  Cardiovascular: Negative for orthopnea, claudication, leg swelling and PND. +intermittent CP, palpitations  Gastrointestinal: Negative for heartburn, nausea, vomiting, blood in stool and melena. Genitourinary: Negative for dysuria and flank pain. Musculoskeletal: Negative for joint pain and back pain. Skin: Negative for rash. Neurological: Negative for focal weakness, seizures, loss of consciousness, weakness and headaches. Endo/Heme/Allergies: Does not bruise/bleed easily. Psychiatric/Behavioral: Negative for memory loss. The patient does not have insomnia. Physical Exam    Visit Vitals    /50 (BP 1 Location: Right arm, BP Patient Position: Sitting)    Pulse 78    Ht 5' 10\" (1.778 m)    Wt 304 lb (137.9 kg)    SpO2 93%    BMI 43.62 kg/m2     Wt Readings from Last 3 Encounters:   09/21/18 304 lb (137.9 kg)   09/20/18 309 lb (140.2 kg)   09/11/18 310 lb (140.6 kg)      General - well developed well nourished, morbidly obese BF  Neck - JVP difficult to assess, thyroid nl  Cardiac - normal S1, S2, no murmurs, rubs or gallops. No clicks  Vascular - carotids without bruits, radials, femorals and pedal pulses equal bilateral  Lungs - clear to auscultation bilaterals, no rales, wheezing or rhonchi  Abd - soft nontender, no HSM, no abd bruits  Extremities - 1-2+ pedal edema and pre-tibial edema. Skin - no rash  Neuro - nonfocal  Psych - normal mood and affect      Cardiographics  Event Monitor in 8/2018 demonstrated frequent PVCs    Written by Diogenes Campos, as dictated by Dr. Cherelle Muñoz.      Cherelle Muñoz MD

## 2018-09-23 PROBLEM — I50.30 (HFPEF) HEART FAILURE WITH PRESERVED EJECTION FRACTION (HCC): Status: ACTIVE | Noted: 2018-09-23

## 2018-09-23 RX ORDER — CARVEDILOL 25 MG/1
37.5 TABLET ORAL 2 TIMES DAILY WITH MEALS
Qty: 180 TAB | Refills: 3 | Status: SHIPPED | OUTPATIENT
Start: 2018-09-23 | End: 2019-05-21

## 2018-09-23 RX ORDER — NITROGLYCERIN 0.3 MG/1
0.4 TABLET SUBLINGUAL
Qty: 1 BOTTLE | Refills: 1 | Status: SHIPPED | OUTPATIENT
Start: 2018-09-23 | End: 2019-05-21

## 2018-10-03 ENCOUNTER — NURSE NAVIGATOR (OUTPATIENT)
Dept: CASE MANAGEMENT | Age: 63
End: 2018-10-03

## 2018-10-04 ENCOUNTER — OFFICE VISIT (OUTPATIENT)
Dept: FAMILY MEDICINE CLINIC | Age: 63
End: 2018-10-04

## 2018-10-04 ENCOUNTER — HOSPITAL ENCOUNTER (OUTPATIENT)
Dept: LAB | Age: 63
Discharge: HOME | End: 2018-10-04
Payer: MEDICARE

## 2018-10-04 VITALS
SYSTOLIC BLOOD PRESSURE: 87 MMHG | HEART RATE: 69 BPM | RESPIRATION RATE: 18 BRPM | DIASTOLIC BLOOD PRESSURE: 53 MMHG | HEIGHT: 70 IN | WEIGHT: 293 LBS | TEMPERATURE: 98.1 F | OXYGEN SATURATION: 100 % | BODY MASS INDEX: 41.95 KG/M2

## 2018-10-04 DIAGNOSIS — B35.9 RINGWORM: ICD-10-CM

## 2018-10-04 DIAGNOSIS — M10.361 ACUTE GOUT DUE TO RENAL IMPAIRMENT INVOLVING RIGHT KNEE: ICD-10-CM

## 2018-10-04 DIAGNOSIS — Z51.81 MEDICATION MONITORING ENCOUNTER: ICD-10-CM

## 2018-10-04 DIAGNOSIS — G89.4 CHRONIC PAIN SYNDROME: ICD-10-CM

## 2018-10-04 DIAGNOSIS — Z79.01 WARFARIN ANTICOAGULATION: Primary | ICD-10-CM

## 2018-10-04 LAB
INR BLD: 5.4
PT POC: 64.5 SECONDS
VALID INTERNAL CONTROL?: YES

## 2018-10-04 PROCEDURE — 84550 ASSAY OF BLOOD/URIC ACID: CPT

## 2018-10-04 PROCEDURE — 85610 PROTHROMBIN TIME: CPT

## 2018-10-04 PROCEDURE — 85651 RBC SED RATE NONAUTOMATED: CPT

## 2018-10-04 RX ORDER — OXYCODONE AND ACETAMINOPHEN 7.5; 325 MG/1; MG/1
1 TABLET ORAL
Qty: 60 TAB | Refills: 0 | Status: SHIPPED | OUTPATIENT
Start: 2018-10-04 | End: 2018-11-13 | Stop reason: SDUPTHER

## 2018-10-04 RX ORDER — METHYLPREDNISOLONE 4 MG/1
TABLET ORAL
Qty: 1 DOSE PACK | Refills: 0 | Status: SHIPPED | OUTPATIENT
Start: 2018-10-04 | End: 2019-01-08 | Stop reason: ALTCHOICE

## 2018-10-04 RX ORDER — KETOCONAZOLE 20 MG/G
CREAM TOPICAL 2 TIMES DAILY
Qty: 30 G | Refills: 0 | Status: SHIPPED | OUTPATIENT
Start: 2018-10-04 | End: 2019-01-08 | Stop reason: ALTCHOICE

## 2018-10-04 NOTE — PATIENT INSTRUCTIONS

## 2018-10-04 NOTE — PROGRESS NOTES
Chief Complaint Patient presents with  Anticoagulation 1. Have you been to the ER, urgent care clinic since your last visit? Hospitalized since your last visit? No 
 
2. Have you seen or consulted any other health care providers outside of the [address] [place] [address] since your last visit? Include any pap smears or colon screening. Seen by Dr. Lorene Maciel last Tuesday. Visit Vitals  BP (!) 87/53 (BP 1 Location: Right arm, BP Patient Position: Sitting)  Pulse 69  Temp 98.1 °F (36.7 °C) (Oral)  Resp 18  Ht 5' 10\" (1.778 m)  Wt 308 lb (139.7 kg)  SpO2 100%  BMI 44.19 kg/m2

## 2018-10-04 NOTE — PROGRESS NOTES
Ezra Purcell is a 63 y.o. female Chief Complaint Patient presents with  Anticoagulation  
 pt here for recheck of her INR and it came but over 5. However, there is an issue with the strips from the  where if level is over 4.5 to disregard and send out to lab. Will send out. Pt BP is also running low and pt had the hydralazine D/c'd by cardiology and they are currently managing her BP. Also reports recent hgb was 10.? Pt also reports R knee pain and states is hot an painful and has a hx of knee replacement of the R knee. Pt denies fever or chills. Knee was replaced in 2012. Pain is up to a 9/10. Pt is also requesting a refill of her narcotic pain medication and this is used for her chronic anginal chest pain and chronic R leg pain as well. This has been working well and pt has no hx of abuse. Pt has been compliant with utox and no early refills or lost Rx. Pt is early for her fill but this had been discussed at her last visit and we did temporarily increase her dose so this is appropriate. Opioid/Pain Management: 
 
1. Has the patient signed a pain contract for chronic narcotic use? yes 2. Has the patient had a UDS or Serum screen as per guidelines as per [facility]?:   yes 3. Does patient meet necessary guidelines for Naloxone treatement per the [facility]?:    no 
4. Has the Prescription Monitoring Program been reviewed? yes 5. Does patient have a long term condition that requires long term use of a Narcotic?  yes 6. Has patient been tolerant of therapy and responsible to routine follow up and specialist follow-up? Yes 
 
 
she is a 63y.o. year old female who presents for evalution. Reviewed PmHx, RxHx, FmHx, SocHx, AllgHx and updated and dated in the chart. Review of Systems - negative except as listed above in the HPI Objective:  
 
Vitals:  
 10/04/18 1130 BP: (!) 87/53 Pulse: 69 Resp: 18  
 Temp: 98.1 °F (36.7 °C) TempSrc: Oral  
SpO2: 100% Weight: 308 lb (139.7 kg) Height: 5' 10\" (1.778 m) Current Outpatient Prescriptions Medication Sig  
 methylPREDNISolone (MEDROL DOSEPACK) 4 mg tablet Take as directed  ketoconazole (NIZORAL) 2 % topical cream Apply  to affected area two (2) times a day.  oxyCODONE-acetaminophen (PERCOCET) 7.5-325 mg per tablet Take 1 Tab by mouth two (2) times daily as needed for Pain. Max Daily Amount: 2 Tabs. May fill today 10/4/18  nitroglycerin (NITROSTAT) 0.3 mg SL tablet 1 Tab by SubLINGual route every five (5) minutes as needed for Chest Pain.  carvedilol (COREG) 25 mg tablet Take 1.5 Tabs by mouth two (2) times daily (with meals).  pantoprazole (PROTONIX) 40 mg tablet TAKE 1 TABLET BY MOUTH EVERY DAY FOR HEARTBURN  
 warfarin (COUMADIN) 3 mg tablet 3mg PO everyday  ALLERGY 25 mg capsule TAKE 2 CAPSULES BY MOUTH 1 HOUR PRIOR TO PROCEDURE  
 amLODIPine (NORVASC) 10 mg tablet Take 10 mg by mouth daily.  allopurinol (ZYLOPRIM) 100 mg tablet Take 100 mg by mouth daily.  albuterol (PROAIR HFA) 90 mcg/actuation inhaler Take 2 Puffs by inhalation every four (4) hours as needed for Wheezing.  isosorbide mononitrate ER (IMDUR) 120 mg CR tablet TAKE 1 TABLET BY MOUTH EVERY DAY  albuterol (PROVENTIL VENTOLIN) 2.5 mg /3 mL (0.083 %) nebulizer solution 2.5 mg by Nebulization route every four (4) hours as needed.  insulin aspart protamine/insulin aspart (NOVOLOG MIX 70-30) 100 unit/mL (70-30) injection by SubCUTAneous route three (3) times daily as needed (for BG  > 140 mg/dL). Uses sliding scale  fluticasone (FLONASE) 50 mcg/actuation nasal spray 2 Sprays by Both Nostrils route nightly as needed.  ergocalciferol (VITAMIN D2) 50,000 unit capsule Take 50,000 Units by mouth every Tuesday.  aspirin 81 mg tablet Take 81 mg by mouth daily.  atorvastatin (LIPITOR) 80 mg tablet Take 80 mg by mouth nightly.  lidocaine (XYLOCAINE) 5 % ointment Apply  to affected area as needed for Pain.  sevelamer carbonate (RENVELA) 800 mg tab tab TAKE 2 TABLETS BY MOUTH WITH MEALS AND TAKE 1 TABLET BY MOUTH WITH SNACKS  predniSONE (DELTASONE) 50 mg tablet TAKE 1 TABLET BY MOUTH 13 HOURS,7 HOURS AND 1 HOUR PRIOR TO PROCEDURE  
 lidocaine-prilocaine (EMLA) topical cream APPLY TO FISTULA SITE 30 MINUTES TO 1 HOUR PRIOR TO TREATMENT  bumetanide (BUMEX) 2 mg tablet TAKE 1 TABLET BY MOUTH TWICE A DAY  mupirocin (BACTROBAN) 2 % ointment Apply  to affected area three (3) times daily. (Patient not taking: Reported on 9/11/2018)  lidocaine (XYLOCAINE) 2 % jelly Use TID as needed (Patient not taking: Reported on 9/11/2018)  bumetanide (BUMEX) 1 mg tablet Take 2 Tabs by mouth two (2) times a day. (Patient not taking: Reported on 9/11/2018)  metOLazone (ZAROXOLYN) 5 mg tablet Take 1 Tab by mouth daily. (Patient not taking: Reported on 9/11/2018)  calcitRIOL (ROCALTROL) 0.25 mcg capsule Take 0.25 mcg by mouth every Monday, Wednesday, Friday, Saturday. No current facility-administered medications for this visit. Physical Examination: General appearance - alert, well appearing, and in no distress Chest - clear to auscultation, no wheezes, rales or rhonchi, symmetric air entry Heart - normal rate, regular rhythm, normal S1, S2, no murmurs, rubs, clicks or gallops Musculoskeletal - tender hot R knee c/w gout Skin - L axilla with ringworm Assessment/ Plan:  
Diagnoses and all orders for this visit: 
 
1. Warfarin anticoagulation -     AMB POC PT/INR 2. Medication monitoring encounter 
-     PROTHROMBIN TIME + INR 
 
3. Acute gout due to renal impairment involving right knee 
-     URIC ACID 
-     SED RATE (ESR) 
-     XR KNEE RT 3 V; Future 
-     methylPREDNISolone (MEDROL DOSEPACK) 4 mg tablet; Take as directed 4.  Ringworm 
-     ketoconazole (NIZORAL) 2 % topical cream; Apply  to affected area two (2) times a day. 5. Chronic pain syndrome 
-     oxyCODONE-acetaminophen (PERCOCET) 7.5-325 mg per tablet; Take 1 Tab by mouth two (2) times daily as needed for Pain. Max Daily Amount: 2 Tabs. May fill today 10/4/18 Follow-up Disposition: 
Return if symptoms worsen or fail to improve. I have discussed the diagnosis with the patient and the intended plan as seen in the above orders. The patient has received an after-visit summary and questions were answered concerning future plans. Pt conveyed understanding of plan. Medication Side Effects and Warnings were discussed with patient Lazarus Vealsco,

## 2018-10-05 ENCOUNTER — TELEPHONE (OUTPATIENT)
Dept: CASE MANAGEMENT | Age: 63
End: 2018-10-05

## 2018-10-05 LAB
ERYTHROCYTE [SEDIMENTATION RATE] IN BLOOD BY WESTERGREN METHOD: 36 MM/HR (ref 0–40)
INR PPP: 4.5 (ref 0.8–1.2)
PROTHROMBIN TIME: 43.3 SEC (ref 9.1–12)
URATE SERPL-MCNC: 3.2 MG/DL (ref 2.5–7.1)

## 2018-10-05 NOTE — PROGRESS NOTES
Your coumadin level is elevated at 3.2 so just skip today then skip every friday and lets recheck in 2 weeks. Your uric acid level is actually low but this can also be due to uric acid accumulation in the joint. Is your knee feeling any better?

## 2018-10-09 ENCOUNTER — HOSPITAL ENCOUNTER (OUTPATIENT)
Dept: GENERAL RADIOLOGY | Age: 63
Discharge: HOME | End: 2018-10-09
Attending: FAMILY MEDICINE
Payer: MEDICARE

## 2018-10-09 DIAGNOSIS — M10.361 ACUTE GOUT DUE TO RENAL IMPAIRMENT INVOLVING RIGHT KNEE: ICD-10-CM

## 2018-10-09 PROCEDURE — 73562 X-RAY EXAM OF KNEE 3: CPT

## 2018-10-23 ENCOUNTER — NURSE NAVIGATOR (OUTPATIENT)
Dept: CASE MANAGEMENT | Age: 63
End: 2018-10-23

## 2018-10-23 NOTE — NURSE NAVIGATOR
Patient has not transmitted a [facility] reading since 10/10/18. TC to patient who states she has been having trouble with her knee and her port and is overwhelmed. She plans to start transmitting readings again by the end of the week. Patient was called by NN on 10/5 with reading reminder and has transmitted 2 readings since then. TC made to Dr Wang's nurse, Sandrine Jaime, and readings since 9/11 faxed directly to him for Dr. Beatris Arce review. Notified him patient has not been transmitting readings the past 2 weeks and has sent only 7 readings since last fax on 9/11.

## 2018-10-31 ENCOUNTER — NURSE NAVIGATOR (OUTPATIENT)
Dept: CASE MANAGEMENT | Age: 63
End: 2018-10-31

## 2018-11-07 ENCOUNTER — HOSPITAL ENCOUNTER (OUTPATIENT)
Dept: LAB | Age: 63
Discharge: HOME | End: 2018-11-07
Payer: MEDICARE

## 2018-11-07 ENCOUNTER — OFFICE VISIT (OUTPATIENT)
Dept: FAMILY MEDICINE CLINIC | Age: 63
End: 2018-11-07

## 2018-11-07 VITALS
OXYGEN SATURATION: 98 % | SYSTOLIC BLOOD PRESSURE: 126 MMHG | BODY MASS INDEX: 41.95 KG/M2 | HEIGHT: 70 IN | RESPIRATION RATE: 16 BRPM | TEMPERATURE: 97.9 F | HEART RATE: 64 BPM | DIASTOLIC BLOOD PRESSURE: 72 MMHG | WEIGHT: 293 LBS

## 2018-11-07 DIAGNOSIS — Z79.01 WARFARIN ANTICOAGULATION: ICD-10-CM

## 2018-11-07 DIAGNOSIS — R31.9 HEMATURIA, UNSPECIFIED TYPE: Primary | ICD-10-CM

## 2018-11-07 PROCEDURE — 85610 PROTHROMBIN TIME: CPT

## 2018-11-07 PROCEDURE — 81003 URINALYSIS AUTO W/O SCOPE: CPT

## 2018-11-07 RX ORDER — WARFARIN 3 MG/1
TABLET ORAL
Qty: 90 TAB | Refills: 3
Start: 2018-11-07 | End: 2018-11-08

## 2018-11-07 NOTE — PROGRESS NOTES
Chief Complaint Patient presents with  Labs INR  Blood in Urine Patient presents in office to day for INR recheck. Also has c/o blood in urine. States she has the urge to go frequently but due to dialysis she does not produce much urine. States that dialysis wants her to have a culture done. No further concerns. 1. Have you been to the ER, urgent care clinic since your last visit? Hospitalized since your last visit? No 
 
2. Have you seen or consulted any other health care providers outside of the [address] [place] [address] since your last visit? Include any pap smears or colon screening. Yes When: 10/2018 to Dr. Radha Tello Learning Assessment 3/20/2018 PRIMARY LEARNER Patient PRIMARY LANGUAGE ENGLISH  
LEARNER PREFERENCE PRIMARY LISTENING  
ANSWERED BY patient RELATIONSHIP SELF

## 2018-11-07 NOTE — PATIENT INSTRUCTIONS
Ã¯Â»Â Anticoagulants: After Your Visit Your Care Instructions Your doctor prescribed an anticoagulant medicine. Anticoagulants, often called blood thinners, prevent new blood clots from forming and keep existing clots from getting larger. They do not actually thin the blood, but they make the blood take longer to clot. This lowers the risk of a blood clot moving to the lungs (pulmonary embolism) or moving to the brain and causing a stroke. Blood thinners come in two forms. Heparin is given by shot, either under your skin or through a needle in your vein, and starts working right away. Warfarin (Coumadin) comes in pill form and takes longer to work. Your doctor may have you begin taking both forms at the same time. As soon as the pills start to work, you will stop the shots but continue to take the pills. If you have a blood clot in your leg, you may need to take warfarin for several months. People who have heart conditions such as atrial fibrillation often need to take it for the rest of their lives. The right dose of this medicine is different for each person. You will need regular blood tests to see if your dose is correct. Follow-up care is a key part of your treatment and safety. Be sure to make and go to all appointments, and call your doctor if you are having problems. Itâs also a good idea to know your test results and keep a list of the medicines you take. How do you take blood thinners? · Take your medicines exactly as prescribed. Call your doctor if you think you are having a problem with your medicine. · Call your doctor if you are not sure what to do if you missed a dose of blood thinner. ¨ Your doctor can tell you exactly what to do so you do not take too much or too little blood thinner. Then you will be as safe as possible. · Some general rules for what to do if you miss a dose: ¨ If you remember it in the same day, take the missed dose. Then go back to your regular schedule. ¨ If it is the next day, or almost time to take the next dose, do not take the missed dose. Do not double the dose to make up for the missed one. At your next regularly scheduled time, take your normal dose. ¨ If you miss your dose for 2 or more days, call your doctor. · To help you stay on schedule, use a calendar to remind you when to take your blood thinner. When you take the medicine, note it on the calendar. · If you are going to give yourself shots, your doctor will give you instructions for how to safely inject the medicine. Follow the directions carefully. · Do not take any vitamins, over-the-counter medicines, or herbal products without talking to your doctor first. 
· Avoid contact sports and other activities that could lead to injury. Make your home safe and take other measures to reduce your risk of falling. Always wear a seat belt while in a car. · Do not suddenly change your intake of vitamin Kârich foods, such as broccoli, cabbage, asparagus, lettuce, and spinach. This will help blood thinners work evenly from day to day. · Limit alcohol to 2 drinks a day for men and 1 drink a day for women. Alcohol may interfere with blood thinners. It also increases your risk of falls, which can cause bruising and bleeding. · Tell your dentist, pharmacist, and other health professionals that you take blood thinners. Wear medical alert jewelry that says you take blood thinners. You can buy this at most drugstores. When should you call for help? Call 911 anytime you think you may need emergency care. For example, call if: 
· You cough up blood. · You vomit blood or what looks like coffee grounds. · You pass maroon or very bloody stools. Call your doctor now or seek immediate medical care if: 
· You have new bruises or blood spots under your skin. · You have a nosebleed. · Your gums bleed when you brush your teeth. · You have blood in your urine. · Your stools are black and tarlike or have streaks of blood. · You have vaginal bleeding when you are not having your period, or heavy period bleeding. Watch closely for changes in your health, and be sure to contact your doctor if: 
· You have questions about your medicine. Where can you learn more? Go to [employer] Enter B774 in the search box to learn more about \"Anticoagulants: After Your Visit. \" Â© 5482-0472 [employer]. Care instructions adapted under license by [employer] (which disclaims liability or warranty for this information). This care instruction is for use with your licensed healthcare professional. If you have questions about a medical condition or this instruction, always ask your healthcare professional. [address] any warranty or liability for your use of this information. Content Version: 3.3.17356; Last Revised: July 1, 2009

## 2018-11-07 NOTE — PROGRESS NOTES
Delmi Durand is a 63 y.o. female Chief Complaint Patient presents with  Labs INR  Blood in Urine  
 pt here for INR and esthela have to send out. Pt is currently taking 3mg everyday except for fridays. Esthela lsend out since our machine is on recall. Pt also with blood in her urine and was put on augmentin x 5 days and states now has a yeast infection and would like something for this. Pt also would like urine culture and I agree since she is continuing to have issues. she is a 63y.o. year old female who presents for evalution. Reviewed PmHx, RxHx, FmHx, SocHx, AllgHx and updated and dated in the chart. Review of Systems - negative except as listed above in the HPI Objective:  
 
Vitals:  
 11/07/18 1524 BP: 126/72 Pulse: 64 Resp: 16 Temp: 97.9 °F (36.6 °C) TempSrc: Oral  
SpO2: 98% Weight: 310 lb (140.6 kg) Height: 5' 10\" (1.778 m) Current Outpatient Medications Medication Sig  warfarin (COUMADIN) 3 mg tablet 3mg PO everyday except skip friday  oxyCODONE-acetaminophen (PERCOCET) 7.5-325 mg per tablet Take 1 Tab by mouth two (2) times daily as needed for Pain. Max Daily Amount: 2 Tabs. May fill today 10/4/18  nitroglycerin (NITROSTAT) 0.3 mg SL tablet 1 Tab by SubLINGual route every five (5) minutes as needed for Chest Pain.  carvedilol (COREG) 25 mg tablet Take 1.5 Tabs by mouth two (2) times daily (with meals).  pantoprazole (PROTONIX) 40 mg tablet TAKE 1 TABLET BY MOUTH EVERY DAY FOR HEARTBURN  
 ALLERGY 25 mg capsule TAKE 2 CAPSULES BY MOUTH 1 HOUR PRIOR TO PROCEDURE  
 amLODIPine (NORVASC) 10 mg tablet Take 10 mg by mouth daily.  allopurinol (ZYLOPRIM) 100 mg tablet Take 100 mg by mouth daily.  albuterol (PROAIR HFA) 90 mcg/actuation inhaler Take 2 Puffs by inhalation every four (4) hours as needed for Wheezing.   
 isosorbide mononitrate ER (IMDUR) 120 mg CR tablet TAKE 1 TABLET BY MOUTH EVERY DAY  
  albuterol (PROVENTIL VENTOLIN) 2.5 mg /3 mL (0.083 %) nebulizer solution 2.5 mg by Nebulization route every four (4) hours as needed.  insulin aspart protamine/insulin aspart (NOVOLOG MIX 70-30) 100 unit/mL (70-30) injection by SubCUTAneous route three (3) times daily as needed (for BG  > 140 mg/dL). Uses sliding scale  ergocalciferol (VITAMIN D2) 50,000 unit capsule Take 50,000 Units by mouth every Tuesday.  aspirin 81 mg tablet Take 81 mg by mouth daily.  atorvastatin (LIPITOR) 80 mg tablet Take 80 mg by mouth nightly.  methylPREDNISolone (MEDROL DOSEPACK) 4 mg tablet Take as directed  ketoconazole (NIZORAL) 2 % topical cream Apply  to affected area two (2) times a day.  lidocaine (XYLOCAINE) 5 % ointment Apply  to affected area as needed for Pain.  sevelamer carbonate (RENVELA) 800 mg tab tab TAKE 2 TABLETS BY MOUTH WITH MEALS AND TAKE 1 TABLET BY MOUTH WITH SNACKS  predniSONE (DELTASONE) 50 mg tablet TAKE 1 TABLET BY MOUTH 13 HOURS,7 HOURS AND 1 HOUR PRIOR TO PROCEDURE  
 lidocaine-prilocaine (EMLA) topical cream APPLY TO FISTULA SITE 30 MINUTES TO 1 HOUR PRIOR TO TREATMENT  bumetanide (BUMEX) 2 mg tablet TAKE 1 TABLET BY MOUTH TWICE A DAY  mupirocin (BACTROBAN) 2 % ointment Apply  to affected area three (3) times daily. (Patient not taking: Reported on 9/11/2018)  lidocaine (XYLOCAINE) 2 % jelly Use TID as needed (Patient not taking: Reported on 9/11/2018)  bumetanide (BUMEX) 1 mg tablet Take 2 Tabs by mouth two (2) times a day. (Patient not taking: Reported on 9/11/2018)  metOLazone (ZAROXOLYN) 5 mg tablet Take 1 Tab by mouth daily. (Patient not taking: Reported on 9/11/2018)  fluticasone (FLONASE) 50 mcg/actuation nasal spray 2 Sprays by Both Nostrils route nightly as needed.  calcitRIOL (ROCALTROL) 0.25 mcg capsule Take 0.25 mcg by mouth every Monday, Wednesday, Friday, Saturday. No current facility-administered medications for this visit. Physical Examination: General appearance - alert, well appearing, and in no distress Chest - clear to auscultation, no wheezes, rales or rhonchi, symmetric air entry Heart - normal rate, regular rhythm, normal S1, S2, no murmurs, rubs, clicks or gallops Assessment/ Plan:  
Diagnoses and all orders for this visit: 1. Hematuria, unspecified type 
-     CULTURE, URINE 
-     URINALYSIS W/ RFLX MICROSCOPIC 2. Warfarin anticoagulation 
-     PROTHROMBIN TIME + INR 
-     warfarin (COUMADIN) 3 mg tablet; 3mg PO everyday except skip friday 
 
 follow up based off of INR Follow-up Disposition: 
Return if symptoms worsen or fail to improve. I have discussed the diagnosis with the patient and the intended plan as seen in the above orders. The patient has received an after-visit summary and questions were answered concerning future plans. Pt conveyed understanding of plan. Medication Side Effects and Warnings were discussed with patient [id number] [facility] [place], DO

## 2018-11-08 ENCOUNTER — TELEPHONE (OUTPATIENT)
Dept: FAMILY MEDICINE CLINIC | Age: 63
End: 2018-11-08

## 2018-11-08 LAB
INR PPP: 6.6 (ref 0.8–1.2)
PROTHROMBIN TIME: 61.3 SEC (ref 9.1–12)
SP GR UR: NORMAL

## 2018-11-08 RX ORDER — WARFARIN 2 MG/1
2 TABLET ORAL DAILY
Qty: 90 TAB | Refills: 3 | Status: SHIPPED | OUTPATIENT
Start: 2018-11-08 | End: 2019-02-13

## 2018-11-08 NOTE — PROGRESS NOTES
Your coumadin level is too high. As long as no current active bleeding we can just skip today and tomorrow then I want you to start 2mg everyday and we need to recheck next week. I will send in a new Rx today for 2mg.

## 2018-11-08 NOTE — TELEPHONE ENCOUNTER
Landon Steinberg calling from [facility] with critical labs on pt. Could not get a nurse. Results are:  PT is 61.3  INR is 6.6   These results are in the chart.

## 2018-11-08 NOTE — PROGRESS NOTES
The coumadin level is probably why you are noticing a little blood in the urine and as long as this is not severe just see me beginning of next week for recheck

## 2018-11-08 NOTE — PROGRESS NOTES
Spoke with patient in reference to labs. Patient verbalized understanding and scheduled an apt for next week.

## 2018-11-13 ENCOUNTER — HOSPITAL ENCOUNTER (OUTPATIENT)
Dept: LAB | Age: 63
Discharge: HOME | End: 2018-11-13
Payer: MEDICARE

## 2018-11-13 ENCOUNTER — OFFICE VISIT (OUTPATIENT)
Dept: FAMILY MEDICINE CLINIC | Age: 63
End: 2018-11-13

## 2018-11-13 VITALS
BODY MASS INDEX: 41.95 KG/M2 | HEART RATE: 64 BPM | RESPIRATION RATE: 16 BRPM | OXYGEN SATURATION: 97 % | WEIGHT: 293 LBS | TEMPERATURE: 98 F | HEIGHT: 70 IN | DIASTOLIC BLOOD PRESSURE: 63 MMHG | SYSTOLIC BLOOD PRESSURE: 97 MMHG

## 2018-11-13 DIAGNOSIS — N30.01 ACUTE CYSTITIS WITH HEMATURIA: ICD-10-CM

## 2018-11-13 DIAGNOSIS — G89.4 CHRONIC PAIN SYNDROME: ICD-10-CM

## 2018-11-13 DIAGNOSIS — R31.0 GROSS HEMATURIA: ICD-10-CM

## 2018-11-13 DIAGNOSIS — R79.1 ELEVATED INR: Primary | ICD-10-CM

## 2018-11-13 PROCEDURE — 87088 URINE BACTERIA CULTURE: CPT

## 2018-11-13 PROCEDURE — 87186 SC STD MICRODIL/AGAR DIL: CPT

## 2018-11-13 PROCEDURE — 85610 PROTHROMBIN TIME: CPT

## 2018-11-13 PROCEDURE — 87077 CULTURE AEROBIC IDENTIFY: CPT

## 2018-11-13 PROCEDURE — 87086 URINE CULTURE/COLONY COUNT: CPT

## 2018-11-13 RX ORDER — OXYCODONE AND ACETAMINOPHEN 7.5; 325 MG/1; MG/1
1 TABLET ORAL
Qty: 60 TAB | Refills: 0 | Status: SHIPPED | OUTPATIENT
Start: 2018-11-13 | End: 2019-01-03 | Stop reason: SDUPTHER

## 2018-11-13 NOTE — PATIENT INSTRUCTIONS
Chronic Pain: Care Instructions Your Care Instructions Chronic pain is pain that lasts a long time (months or even years) and may or may not have a clear cause. It is different from acute pain, which usually does have a clear causelike an injury or illnessand gets better over time. Chronic pain: 
· Lasts over time but may vary from day to day. · Does not go away despite efforts to end it. · May disrupt your sleep and lead to fatigue. · May cause depression or anxiety. · May make your muscles tense, causing more pain. · Can disrupt your work, hobbies, home life, and relationships with friends and family. Chronic pain is a very real condition. It is not just in your head. Treatment can help and usually includes several methods used together, such as medicines, physical therapy, exercise, and other treatments. Learning how to relax and changing negative thought patterns can also help you cope. Chronic pain is complex. Taking an active role in your treatment will help you better manage your pain. Tell your doctor if you have trouble dealing with your pain. You may have to try several things before you find what works best for you. Follow-up care is a key part of your treatment and safety. Be sure to make and go to all appointments, and call your doctor if you are having problems. It's also a good idea to know your test results and keep a list of the medicines you take. How can you care for yourself at home? · Pace yourself. Break up large jobs into smaller tasks. Save harder tasks for days when you have less pain, or go back and forth between hard tasks and easier ones. Take rest breaks. · Relax, and reduce stress. Relaxation techniques such as deep breathing or meditation can help. · Keep moving. Gentle, daily exercise can help reduce pain over the long run. Try low- or no-impact exercises such as walking, swimming, and stationary biking. Do stretches to stay flexible. · Try heat, cold packs, and massage. · Get enough sleep. Chronic pain can make you tired and drain your energy. Talk with your doctor if you have trouble sleeping because of pain. · Think positive. Your thoughts can affect your pain level. Do things that you enjoy to distract yourself when you have pain instead of focusing on the pain. See a movie, read a book, listen to music, or spend time with a friend. · If you think you are depressed, talk to your doctor about treatment. · Keep a daily pain diary. Record how your moods, thoughts, sleep patterns, activities, and medicine affect your pain. You may find that your pain is worse during or after certain activities or when you are feeling a certain emotion. Having a record of your pain can help you and your doctor find the best ways to treat your pain. · Take pain medicines exactly as directed. ? If the doctor gave you a prescription medicine for pain, take it as prescribed. ? If you are not taking a prescription pain medicine, ask your doctor if you can take an over-the-counter medicine. Reducing constipation caused by pain medicine · Include fruits, vegetables, beans, and whole grains in your diet each day. These foods are high in fiber. · Drink plenty of fluids, enough so that your urine is light yellow or clear like water. If you have kidney, heart, or liver disease and have to limit fluids, talk with your doctor before you increase the amount of fluids you drink. · If your doctor recommends it, get more exercise. Walking is a good choice. Bit by bit, increase the amount you walk every day. Try for at least 30 minutes on most days of the week. · Schedule time each day for a bowel movement. A daily routine may help. Take your time and do not strain when having a bowel movement. When should you call for help? Call your doctor now or seek immediate medical care if: 
  · Your pain gets worse or is out of control.   · You feel down or blue, or you do not enjoy things like you once did. You may be depressed, which is common in people with chronic pain. Depression can be treated.  
  · You have vomiting or cramps for more than 2 hours.  
 Watch closely for changes in your health, and be sure to contact your doctor if: 
  · You cannot sleep because of pain.  
  · You are very worried or anxious about your pain.  
  · You have trouble taking your pain medicine.  
  · You have any concerns about your pain medicine.  
  · You have trouble with bowel movements, such as: 
? No bowel movement in 3 days. ? Blood in the anal area, in your stool, or on the toilet paper. ? Diarrhea for more than 24 hours. Where can you learn more? Go to http://lindsay-kai.info/. Enter N004 in the search box to learn more about \"Chronic Pain: Care Instructions. \" Current as of: June 4, 2018 Content Version: 11.8 © 7800-2517 [employer]. Care instructions adapted under license by [email or web address] (which disclaims liability or warranty for this information). If you have questions about a medical condition or this instruction, always ask your healthcare professional. [address] any warranty or liability for your use of this information.

## 2018-11-13 NOTE — PROGRESS NOTES
Arie Waddell is a 63 y.o. female Chief Complaint Patient presents with  Labs  
 pt here for persistent hematuria and has been able to produce very little urine, will see if we can culture this since pt was on abx for a uti and feels like it has worsened since the abx. Pt currently on coumadin 2md daily from prior 3mg daily and did skip two days as recommended. Besides hematuria no other bleeding. Pt is also requesting a refill of her pain medication. Pt uses percocet bid prn for her chronic chest pain and joint pain but this was originally Rx for her chronic anginal chest pain. Pt has no hx of drug abuse. Pain is a 7-9/10 and will be brought to a 4-5/10 with the medication. Pt on blood thinners and can not take NSAID's and is also ESRD. The medication allows her to be more active at home and to do her ADL's and house chores. Opioid/Pain Management: 
1. Has the patient signed a pain contract for chronic narcotic use? yes 2. Has the patient had a UDS or Serum screen as per guidelines as per [facility]?:   yes 3. Does patient meet necessary guidelines for Naloxone treatement per the [facility]?:    no 
4. Has the Prescription Monitoring Program been reviewed? yes 5. Does patient have a long term condition that requires long term use of a Narcotic?  yes 6. Has patient been tolerant of therapy and responsible to routine follow up and specialist follow-up? Yes 
 
 
 
she is a 63y.o. year old female who presents for evalution. Reviewed PmHx, RxHx, FmHx, SocHx, AllgHx and updated and dated in the chart. Review of Systems - negative except as listed above in the HPI Objective:  
 
Vitals:  
 11/13/18 1522 BP: 97/63 Pulse: 64 Resp: 16 Temp: 98 °F (36.7 °C) TempSrc: Oral  
SpO2: 97% Weight: 310 lb (140.6 kg) Height: 5' 10\" (1.778 m) Current Outpatient Medications Medication Sig  
  oxyCODONE-acetaminophen (PERCOCET) 7.5-325 mg per tablet Take 1 Tab by mouth two (2) times daily as needed for Pain. Max Daily Amount: 2 Tabs. May fill today 10/4/18  warfarin (COUMADIN) 2 mg tablet Take 1 Tab by mouth daily. Skip 11/8-11/9 restart 11/10/18  ketoconazole (NIZORAL) 2 % topical cream Apply  to affected area two (2) times a day.  nitroglycerin (NITROSTAT) 0.3 mg SL tablet 1 Tab by SubLINGual route every five (5) minutes as needed for Chest Pain.  carvedilol (COREG) 25 mg tablet Take 1.5 Tabs by mouth two (2) times daily (with meals).  pantoprazole (PROTONIX) 40 mg tablet TAKE 1 TABLET BY MOUTH EVERY DAY FOR HEARTBURN  
 ALLERGY 25 mg capsule TAKE 2 CAPSULES BY MOUTH 1 HOUR PRIOR TO PROCEDURE  
 bumetanide (BUMEX) 2 mg tablet TAKE 1 TABLET BY MOUTH TWICE A DAY  amLODIPine (NORVASC) 10 mg tablet Take 10 mg by mouth daily.  allopurinol (ZYLOPRIM) 100 mg tablet Take 100 mg by mouth daily.  albuterol (PROAIR HFA) 90 mcg/actuation inhaler Take 2 Puffs by inhalation every four (4) hours as needed for Wheezing.  isosorbide mononitrate ER (IMDUR) 120 mg CR tablet TAKE 1 TABLET BY MOUTH EVERY DAY  albuterol (PROVENTIL VENTOLIN) 2.5 mg /3 mL (0.083 %) nebulizer solution 2.5 mg by Nebulization route every four (4) hours as needed.  insulin aspart protamine/insulin aspart (NOVOLOG MIX 70-30) 100 unit/mL (70-30) injection by SubCUTAneous route three (3) times daily as needed (for BG  > 140 mg/dL). Uses sliding scale  fluticasone (FLONASE) 50 mcg/actuation nasal spray 2 Sprays by Both Nostrils route nightly as needed.  calcitRIOL (ROCALTROL) 0.25 mcg capsule Take 0.25 mcg by mouth every Monday, Wednesday, Friday, Saturday.  ergocalciferol (VITAMIN D2) 50,000 unit capsule Take 50,000 Units by mouth every Tuesday.  aspirin 81 mg tablet Take 81 mg by mouth daily.  atorvastatin (LIPITOR) 80 mg tablet Take 80 mg by mouth nightly.  methylPREDNISolone (MEDROL DOSEPACK) 4 mg tablet Take as directed  lidocaine (XYLOCAINE) 5 % ointment Apply  to affected area as needed for Pain.  sevelamer carbonate (RENVELA) 800 mg tab tab TAKE 2 TABLETS BY MOUTH WITH MEALS AND TAKE 1 TABLET BY MOUTH WITH SNACKS  predniSONE (DELTASONE) 50 mg tablet TAKE 1 TABLET BY MOUTH 13 HOURS,7 HOURS AND 1 HOUR PRIOR TO PROCEDURE  
 lidocaine-prilocaine (EMLA) topical cream APPLY TO FISTULA SITE 30 MINUTES TO 1 HOUR PRIOR TO TREATMENT  mupirocin (BACTROBAN) 2 % ointment Apply  to affected area three (3) times daily. (Patient not taking: Reported on 9/11/2018)  lidocaine (XYLOCAINE) 2 % jelly Use TID as needed (Patient not taking: Reported on 9/11/2018)  bumetanide (BUMEX) 1 mg tablet Take 2 Tabs by mouth two (2) times a day. (Patient not taking: Reported on 9/11/2018)  metOLazone (ZAROXOLYN) 5 mg tablet Take 1 Tab by mouth daily. (Patient not taking: Reported on 9/11/2018) No current facility-administered medications for this visit. Physical Examination: General appearance - alert, well appearing, and in no distress Mental status - alert, oriented to person, place, and time Chest - clear to auscultation, no wheezes, rales or rhonchi, symmetric air entry Heart - normal rate, regular rhythm, normal S1, S2, no murmurs, rubs, clicks or gallops Musculoskeletal - R knee ttp over anterior knee mild edema no erythema. Assessment/ Plan:  
Diagnoses and all orders for this visit: 1. Elevated INR 
-     PROTHROMBIN TIME + INR 
 
2. Gross hematuria 
-     REFERRAL TO UROLOGY 3. Chronic pain syndrome 
-     oxyCODONE-acetaminophen (PERCOCET) 7.5-325 mg per tablet; Take 1 Tab by mouth two (2) times daily as needed for Pain. Max Daily Amount: 2 Tabs. May fill today 10/4/18 4. Acute cystitis with hematuria 
-     CULTURE, URINE 
 
 no change in therapy for narcotic pain medication Follow-up Disposition: Return if symptoms worsen or fail to improve. I have discussed the diagnosis with the patient and the intended plan as seen in the above orders. The patient has received an after-visit summary and questions were answered concerning future plans. Pt conveyed understanding of plan. Medication Side Effects and Warnings were discussed with patient [id number] [facility] [place], DO

## 2018-11-13 NOTE — PROGRESS NOTES
Chief Complaint Patient presents with  Labs Patient presents in office today for INR recheck. Was told after her visit last week to come back with a urine sample but was only able to leave a few drops. No other concerns. 1. Have you been to the ER, urgent care clinic since your last visit? Hospitalized since your last visit? No 
 
2. Have you seen or consulted any other health care providers outside of the [address] [place] [address] since your last visit? Include any pap smears or colon screening. No  
 
Learning Assessment 3/20/2018 PRIMARY LEARNER Patient PRIMARY LANGUAGE ENGLISH  
LEARNER PREFERENCE PRIMARY LISTENING  
ANSWERED BY patient RELATIONSHIP SELF

## 2018-11-14 LAB
INR PPP: 3.5 (ref 0.8–1.2)
PROTHROMBIN TIME: 34.1 SEC (ref 9.1–12)

## 2018-11-14 NOTE — PROGRESS NOTES
Your INR is lower but still a little above goal.  I want you to skip today then restart 2 mg daily and recheck in 1.5 weeks

## 2018-11-15 LAB — BACTERIA UR CULT: ABNORMAL

## 2018-11-15 RX ORDER — CIPROFLOXACIN 500 MG/1
500 TABLET ORAL DAILY
Qty: 7 TAB | Refills: 0 | Status: SHIPPED | OUTPATIENT
Start: 2018-11-15 | End: 2018-11-22

## 2018-11-15 NOTE — PROGRESS NOTES
You have a UTI, I sent in cipro to take once daily for 7 days, this will increase your INR so skin every other day of the coumadin while on the cipro.

## 2018-11-26 ENCOUNTER — TELEPHONE (OUTPATIENT)
Dept: CARDIOLOGY CLINIC | Age: 63
End: 2018-11-26

## 2018-11-26 ENCOUNTER — NURSE NAVIGATOR (OUTPATIENT)
Dept: CASE MANAGEMENT | Age: 63
End: 2018-11-26

## 2018-11-26 NOTE — TELEPHONE ENCOUNTER
Pt called to discuss retained fluid and weight gain of 14Ibs over the weekend she would like to discuss.    Phone # [phone]  Thanks

## 2018-11-26 NOTE — TELEPHONE ENCOUNTER
Kassi Samples stated has had weight gain of 9kg over weekend. Pt had dialysis today weight from 147kg to 143kg after dialysis.   Reviewed with Дмитрий Borden will have pt check cardioMEMs

## 2018-11-26 NOTE — TELEPHONE ENCOUNTER
Spoke with Mrs Ralph Khan about worsening LARA and LE edema. Underwent HD today. Dialysis nurse asked her to call cardiology although she is anuric and takes no diuretics.      Reviewed recent CardioMEMS data - largely unchanged with PA diastolics in the 52-65 mmHg range (good waveforms)    She clearly needs more volume off - defer plan to nephrology (Dr Talat Villalpando et al)    Will try to call Dr Talat Villalpando

## 2018-12-13 DIAGNOSIS — M10.372 ACUTE GOUT DUE TO RENAL IMPAIRMENT INVOLVING LEFT FOOT: ICD-10-CM

## 2018-12-13 RX ORDER — ALLOPURINOL 100 MG/1
TABLET ORAL
Qty: 30 TAB | Refills: 5 | Status: SHIPPED | OUTPATIENT
Start: 2018-12-13 | End: 2019-05-11 | Stop reason: SDUPTHER

## 2018-12-24 RX ORDER — ISOSORBIDE MONONITRATE 120 MG/1
TABLET, EXTENDED RELEASE ORAL
Qty: 90 TAB | Refills: 0 | Status: SHIPPED | OUTPATIENT
Start: 2018-12-24 | End: 2019-03-26 | Stop reason: SDUPTHER

## 2019-01-03 ENCOUNTER — OFFICE VISIT (OUTPATIENT)
Dept: FAMILY MEDICINE CLINIC | Age: 64
End: 2019-01-03

## 2019-01-03 VITALS
BODY MASS INDEX: 41.95 KG/M2 | TEMPERATURE: 98 F | HEART RATE: 74 BPM | RESPIRATION RATE: 16 BRPM | HEIGHT: 70 IN | WEIGHT: 293 LBS | DIASTOLIC BLOOD PRESSURE: 51 MMHG | SYSTOLIC BLOOD PRESSURE: 82 MMHG | OXYGEN SATURATION: 96 %

## 2019-01-03 DIAGNOSIS — G89.4 CHRONIC PAIN SYNDROME: ICD-10-CM

## 2019-01-03 DIAGNOSIS — Z79.01 WARFARIN ANTICOAGULATION: Primary | ICD-10-CM

## 2019-01-03 LAB
INR BLD: 2.6
PT POC: NORMAL SECONDS
VALID INTERNAL CONTROL?: YES

## 2019-01-03 RX ORDER — OXYCODONE AND ACETAMINOPHEN 7.5; 325 MG/1; MG/1
1 TABLET ORAL
Qty: 60 TAB | Refills: 0 | Status: SHIPPED | OUTPATIENT
Start: 2019-01-03 | End: 2019-02-07 | Stop reason: SDUPTHER

## 2019-01-03 NOTE — PROGRESS NOTES
Chief Complaint Patient presents with  Medication Refill  
  percocet  Labs INR Patient presents in office today for med refill of percocet. Also needs INR checked. No other concerns. 1. Have you been to the ER, urgent care clinic since your last visit? Hospitalized since your last visit? No 
 
2. Have you seen or consulted any other health care providers outside of the [address] [place] [address] since your last visit? Include any pap smears or colon screening. Yes When: 1/2/19 dialysis Learning Assessment 3/20/2018 PRIMARY LEARNER Patient PRIMARY LANGUAGE ENGLISH  
LEARNER PREFERENCE PRIMARY LISTENING  
ANSWERED BY patient RELATIONSHIP SELF

## 2019-01-03 NOTE — PROGRESS NOTES
Felix Ren is a 66 y.o. female Chief Complaint Patient presents with  Medication Refill  
  percocet  Labs INR pt here for follow up INR currently at 2.6 and will c/w current dosing. Pt is also requesting a refill of her pain medication  checked and last fill was 11/13/18. Pt uses percocet bid prn for her chronic chest pain and joint pain but this was originally Rx for her chronic anginal chest pain. Pt has no hx of drug abuse and no issues with lost Rx or requesting early fills. Pain is a 7-9/10 @ times and will be brought to a 4-5/10 with the medication. Pt on blood thinners and can not take NSAID's and is also ESRD. The medication allows her to be more active at home and to do her ADL's and house chores. Currently on 22.5mme/day however likely less given she is using prn. Opioid/Pain Management: 
1. Has the patient signed a pain contract for chronic narcotic use? yes 2. Has the patient had a UDS or Serum screen as per guidelines as per [facility]?:   yes 3. Does patient meet necessary guidelines for Naloxone treatement per the [facility]?:    no 
4. Has the Prescription Monitoring Program been reviewed? yes 5. Does patient have a long term condition that requires long term use of a Narcotic?  yes 6. Has patient been tolerant of therapy and responsible to routine follow up and specialist follow-up? Yes 
 
 
she is a 66y.o. year old female who presents for evalution. Reviewed PmHx, RxHx, FmHx, SocHx, AllgHx and updated and dated in the chart. Review of Systems - negative except as listed above in the HPI Objective:  
 
Vitals:  
 01/03/19 1406 BP: (!) 82/51 Pulse: 74 Resp: 16 Temp: 98 °F (36.7 °C) TempSrc: Oral  
SpO2: 96% Weight: 301 lb (136.5 kg) Height: 5' 10\" (1.778 m) Current Outpatient Medications Medication Sig  
 oxyCODONE-acetaminophen (PERCOCET) 7.5-325 mg per tablet Take 1 Tab by mouth two (2) times daily as needed for Pain. Max Daily Amount: 2 Tabs. May fill today 10/4/18  isosorbide mononitrate ER (IMDUR) 120 mg CR tablet TAKE 1 TABLET BY MOUTH ONCE DAILY  allopurinol (ZYLOPRIM) 100 mg tablet TAKE 1 TABLET BY MOUTH EVERY DAY  warfarin (COUMADIN) 2 mg tablet Take 1 Tab by mouth daily. Skip 11/8-11/9 restart 11/10/18  ketoconazole (NIZORAL) 2 % topical cream Apply  to affected area two (2) times a day.  nitroglycerin (NITROSTAT) 0.3 mg SL tablet 1 Tab by SubLINGual route every five (5) minutes as needed for Chest Pain.  carvedilol (COREG) 25 mg tablet Take 1.5 Tabs by mouth two (2) times daily (with meals).  pantoprazole (PROTONIX) 40 mg tablet TAKE 1 TABLET BY MOUTH EVERY DAY FOR HEARTBURN  
 ALLERGY 25 mg capsule TAKE 2 CAPSULES BY MOUTH 1 HOUR PRIOR TO PROCEDURE  
 amLODIPine (NORVASC) 10 mg tablet Take 10 mg by mouth daily.  allopurinol (ZYLOPRIM) 100 mg tablet Take 100 mg by mouth daily.  albuterol (PROAIR HFA) 90 mcg/actuation inhaler Take 2 Puffs by inhalation every four (4) hours as needed for Wheezing.  albuterol (PROVENTIL VENTOLIN) 2.5 mg /3 mL (0.083 %) nebulizer solution 2.5 mg by Nebulization route every four (4) hours as needed.  insulin aspart protamine/insulin aspart (NOVOLOG MIX 70-30) 100 unit/mL (70-30) injection by SubCUTAneous route three (3) times daily as needed (for BG  > 140 mg/dL). Uses sliding scale  ergocalciferol (VITAMIN D2) 50,000 unit capsule Take 50,000 Units by mouth every Tuesday.  aspirin 81 mg tablet Take 81 mg by mouth daily.  atorvastatin (LIPITOR) 80 mg tablet Take 80 mg by mouth nightly.  methylPREDNISolone (MEDROL DOSEPACK) 4 mg tablet Take as directed  lidocaine (XYLOCAINE) 5 % ointment Apply  to affected area as needed for Pain.  sevelamer carbonate (RENVELA) 800 mg tab tab TAKE 2 TABLETS BY MOUTH WITH MEALS AND TAKE 1 TABLET BY MOUTH WITH SNACKS  predniSONE (DELTASONE) 50 mg tablet TAKE 1 TABLET BY MOUTH 13 HOURS,7 HOURS AND 1 HOUR PRIOR TO PROCEDURE  
 lidocaine-prilocaine (EMLA) topical cream APPLY TO FISTULA SITE 30 MINUTES TO 1 HOUR PRIOR TO TREATMENT  bumetanide (BUMEX) 2 mg tablet TAKE 1 TABLET BY MOUTH TWICE A DAY  mupirocin (BACTROBAN) 2 % ointment Apply  to affected area three (3) times daily. (Patient not taking: Reported on 9/11/2018)  lidocaine (XYLOCAINE) 2 % jelly Use TID as needed (Patient not taking: Reported on 9/11/2018)  bumetanide (BUMEX) 1 mg tablet Take 2 Tabs by mouth two (2) times a day. (Patient not taking: Reported on 9/11/2018)  metOLazone (ZAROXOLYN) 5 mg tablet Take 1 Tab by mouth daily. (Patient not taking: Reported on 9/11/2018)  fluticasone (FLONASE) 50 mcg/actuation nasal spray 2 Sprays by Both Nostrils route nightly as needed.  calcitRIOL (ROCALTROL) 0.25 mcg capsule Take 0.25 mcg by mouth every Monday, Wednesday, Friday, Saturday. No current facility-administered medications for this visit. Physical Examination: General appearance - alert, well appearing, and in no distress Chest - clear to auscultation, no wheezes, rales or rhonchi, symmetric air entry Heart - normal rate, regular rhythm, normal S1, S2, no murmurs, rubs, clicks or gallops Assessment/ Plan:  
Diagnoses and all orders for this visit: 
 
1. Warfarin anticoagulation -     AMB POC PT/INR 
 
2. Chronic pain syndrome 
-     oxyCODONE-acetaminophen (PERCOCET) 7.5-325 mg per tablet; Take 1 Tab by mouth two (2) times daily as needed for Pain. Max Daily Amount: 2 Tabs. May fill today 10/4/18 Follow-up Disposition: 
Return in about 4 weeks (around 1/31/2019), or if symptoms worsen or fail to improve. I have discussed the diagnosis with the patient and the intended plan as seen in the above orders. The patient has received an after-visit summary and questions were answered concerning future plans.  Pt conveyed understanding of plan. Medication Side Effects and Warnings were discussed with patient Ling Duffy DO

## 2019-01-03 NOTE — PATIENT INSTRUCTIONS
A Healthy Lifestyle: Care Instructions Your Care Instructions A healthy lifestyle can help you feel good, stay at a healthy weight, and have plenty of energy for both work and play. A healthy lifestyle is something you can share with your whole family. A healthy lifestyle also can lower your risk for serious health problems, such as high blood pressure, heart disease, and diabetes. You can follow a few steps listed below to improve your health and the health of your family. Follow-up care is a key part of your treatment and safety. Be sure to make and go to all appointments, and call your doctor if you are having problems. It's also a good idea to know your test results and keep a list of the medicines you take. How can you care for yourself at home? · Do not eat too much sugar, fat, or fast foods. You can still have dessert and treats now and then. The goal is moderation. · Start small to improve your eating habits. Pay attention to portion sizes, drink less juice and soda pop, and eat more fruits and vegetables. ? Eat a healthy amount of food. A 3-ounce serving of meat, for example, is about the size of a deck of cards. Fill the rest of your plate with vegetables and whole grains. ? Limit the amount of soda and sports drinks you have every day. Drink more water when you are thirsty. ? Eat at least 5 servings of fruits and vegetables every day. It may seem like a lot, but it is not hard to reach this goal. A serving or helping is 1 piece of fruit, 1 cup of vegetables, or 2 cups of leafy, raw vegetables. Have an apple or some carrot sticks as an afternoon snack instead of a candy bar. Try to have fruits and/or vegetables at every meal. 
· Make exercise part of your daily routine. You may want to start with simple activities, such as walking, bicycling, or slow swimming. Try to be active 30 to 60 minutes every day.  You do not need to do all 30 to 60 minutes all at once. For example, you can exercise 3 times a day for 10 or 20 minutes. Moderate exercise is safe for most people, but it is always a good idea to talk to your doctor before starting an exercise program. 
· Keep moving. Yolanda Carey the lawn, work in the garden, or [employer]. Take the stairs instead of the elevator at work. · If you smoke, quit. People who smoke have an increased risk for heart attack, stroke, cancer, and other lung illnesses. Quitting is hard, but there are ways to boost your chance of quitting tobacco for good. ? Use nicotine gum, patches, or lozenges. ? Ask your doctor about stop-smoking programs and medicines. ? Keep trying. In addition to reducing your risk of diseases in the future, you will notice some benefits soon after you stop using tobacco. If you have shortness of breath or asthma symptoms, they will likely get better within a few weeks after you quit. · Limit how much alcohol you drink. Moderate amounts of alcohol (up to 2 drinks a day for men, 1 drink a day for women) are okay. But drinking too much can lead to liver problems, high blood pressure, and other health problems. Family health If you have a family, there are many things you can do together to improve your health. · Eat meals together as a family as often as possible. · Eat healthy foods. This includes fruits, vegetables, lean meats and dairy, and whole grains. · Include your family in your fitness plan. Most people think of activities such as jogging or tennis as the way to fitness, but there are many ways you and your family can be more active. Anything that makes you breathe hard and gets your heart pumping is exercise. Here are some tips: 
? Walk to do errands or to take your child to school or the bus. 
? Go for a family bike ride after dinner instead of watching TV. Where can you learn more? Go to http://lindsay-kai.info/. Enter Q319 in the search box to learn more about \"A Healthy Lifestyle: Care Instructions. \" Current as of: December 7, 2017 Content Version: 11.8 © 8340-5738 [employer], [employer]. Care instructions adapted under license by [employer] (which disclaims liability or warranty for this information). If you have questions about a medical condition or this instruction, always ask your healthcare professional. [place] [address] any warranty or liability for your use of this information.

## 2019-01-04 ENCOUNTER — TELEPHONE (OUTPATIENT)
Dept: CASE MANAGEMENT | Age: 64
End: 2019-01-04

## 2019-01-04 DIAGNOSIS — K59.03 THERAPEUTIC OPIOID INDUCED CONSTIPATION: Primary | ICD-10-CM

## 2019-01-04 DIAGNOSIS — T40.2X5A THERAPEUTIC OPIOID INDUCED CONSTIPATION: Primary | ICD-10-CM

## 2019-01-04 RX ORDER — LUBIPROSTONE 24 UG/1
24 CAPSULE, GELATIN COATED ORAL 2 TIMES DAILY WITH MEALS
Qty: 60 CAP | Refills: 2 | Status: ON HOLD | OUTPATIENT
Start: 2019-01-04 | End: 2019-01-18 | Stop reason: CLARIF

## 2019-01-08 ENCOUNTER — OFFICE VISIT (OUTPATIENT)
Dept: CARDIOLOGY CLINIC | Age: 64
End: 2019-01-08

## 2019-01-08 VITALS
HEIGHT: 70 IN | BODY MASS INDEX: 41.95 KG/M2 | WEIGHT: 293 LBS | SYSTOLIC BLOOD PRESSURE: 114 MMHG | OXYGEN SATURATION: 98 % | HEART RATE: 68 BPM | RESPIRATION RATE: 20 BRPM | DIASTOLIC BLOOD PRESSURE: 62 MMHG

## 2019-01-08 DIAGNOSIS — Z79.01 WARFARIN ANTICOAGULATION: ICD-10-CM

## 2019-01-08 DIAGNOSIS — G47.33 OBSTRUCTIVE SLEEP APNEA SYNDROME: ICD-10-CM

## 2019-01-08 DIAGNOSIS — D64.9 NORMOCYTIC ANEMIA: Chronic | ICD-10-CM

## 2019-01-08 DIAGNOSIS — R00.2 PALPITATIONS: ICD-10-CM

## 2019-01-08 DIAGNOSIS — Z99.2 ESRD ON HEMODIALYSIS (HCC): ICD-10-CM

## 2019-01-08 DIAGNOSIS — E11.22 TYPE 2 DIABETES MELLITUS WITH CHRONIC KIDNEY DISEASE ON CHRONIC DIALYSIS, WITH LONG-TERM CURRENT USE OF INSULIN (HCC): ICD-10-CM

## 2019-01-08 DIAGNOSIS — G47.33 OSA ON CPAP: ICD-10-CM

## 2019-01-08 DIAGNOSIS — I25.118 ATHEROSCLEROSIS OF NATIVE CORONARY ARTERY OF NATIVE HEART WITH STABLE ANGINA PECTORIS (HCC): Primary | ICD-10-CM

## 2019-01-08 DIAGNOSIS — I49.3 FREQUENT PVCS: ICD-10-CM

## 2019-01-08 DIAGNOSIS — I10 ESSENTIAL HYPERTENSION: ICD-10-CM

## 2019-01-08 DIAGNOSIS — N18.6 TYPE 2 DIABETES MELLITUS WITH CHRONIC KIDNEY DISEASE ON CHRONIC DIALYSIS, WITH LONG-TERM CURRENT USE OF INSULIN (HCC): ICD-10-CM

## 2019-01-08 DIAGNOSIS — Z95.9 S/P LEFT PULMONARY ARTERY PRESSURE SENSOR IMPLANT PLACEMENT: ICD-10-CM

## 2019-01-08 DIAGNOSIS — Z79.4 TYPE 2 DIABETES MELLITUS WITH CHRONIC KIDNEY DISEASE ON CHRONIC DIALYSIS, WITH LONG-TERM CURRENT USE OF INSULIN (HCC): ICD-10-CM

## 2019-01-08 DIAGNOSIS — I50.30 (HFPEF) HEART FAILURE WITH PRESERVED EJECTION FRACTION (HCC): ICD-10-CM

## 2019-01-08 DIAGNOSIS — J44.9 COPD, SEVERE (HCC): ICD-10-CM

## 2019-01-08 DIAGNOSIS — N18.6 ESRD ON HEMODIALYSIS (HCC): ICD-10-CM

## 2019-01-08 DIAGNOSIS — Z86.718 HX OF DEEP VENOUS THROMBOSIS: ICD-10-CM

## 2019-01-08 DIAGNOSIS — E66.01 MORBID OBESITY (HCC): Chronic | ICD-10-CM

## 2019-01-08 DIAGNOSIS — Z99.2 TYPE 2 DIABETES MELLITUS WITH CHRONIC KIDNEY DISEASE ON CHRONIC DIALYSIS, WITH LONG-TERM CURRENT USE OF INSULIN (HCC): ICD-10-CM

## 2019-01-08 DIAGNOSIS — Z99.89 OSA ON CPAP: ICD-10-CM

## 2019-01-08 DIAGNOSIS — I27.20 PULMONARY HTN (HCC): ICD-10-CM

## 2019-01-08 DIAGNOSIS — J84.9 ILD (INTERSTITIAL LUNG DISEASE) (HCC): ICD-10-CM

## 2019-01-08 RX ORDER — WARFARIN 3 MG/1
TABLET ORAL
Status: ON HOLD | COMMUNITY
End: 2019-01-18 | Stop reason: SDUPTHER

## 2019-01-08 RX ORDER — AMLODIPINE BESYLATE 5 MG/1
5 TABLET ORAL DAILY
COMMUNITY
End: 2019-02-22

## 2019-01-08 NOTE — PROGRESS NOTES
Visit Vitals  /62 (BP 1 Location: Right arm)   Pulse 68   Resp 20   Ht 5' 10\" (1.778 m)   Wt 303 lb 6.4 oz (137.6 kg)   SpO2 98%   BMI 43.53 kg/m²       Patient is here for follow up. C/o worsening SOB and chest pain. Refused EKG until she spoke to physician.

## 2019-01-08 NOTE — H&P (VIEW-ONLY)
Nora Zambrano MD [facility] [address] [facility] [place], [address] [facility] Office [phone] Fax [phone] Cell [phone] Triny Mirza is a 66 y.o. female. Last seen 3 months ago. Assessment Encounter Diagnoses Name Primary?  Atherosclerosis of native coronary artery of native heart with stable angina pectoris ([address] Yes  Type 2 diabetes mellitus with chronic kidney disease on chronic dialysis, with long-term current use of insulin ([address]  Essential hypertension  JASEN on CPAP   
 Pulmonary HTN   
 (HFpEF) heart failure with preserved ejection fraction ([address]  S/P left pulmonary artery pressure sensor implant placement  Palpitations  Frequent PVCs  COPD, severe ([address]  ILD (interstitial lung disease) ([address]  Obstructive sleep apnea syndrome  ESRD on hemodialysis ([address]  Morbid obesity  Warfarin anticoagulation  Hx of deep venous thrombosis  Normocytic anemia Recommendations: 1. HFpEF, class 3 with moderate to severe PA HTN, s/p cardioMEMS. PAD in 11/2018 was 25-26. She has not had any recent readings. Volume is being managed by dialysis. She is no longer on diuretic therapy. I have asked to resume [facility]. 2. Chronic lung disease, oxygen dependent. She is adherent with CPAP. 3. CAD, RCA  by cath 2014. Lexiscan cardiolite 2016 normal. She has class 3 anginal sxs associated with palpitations. Her sxs are much worse. Favor repeat cath at this time. Right radial cath was attempted last year without success. She has an AV fistula in her right arm. Favor right femoral approach. Will arrange with Dr. Joanie Koch, preferably on a dialysis day. 4. Symptomatic PVCs. I am concerned about AF as well. Will get 24 hour Holter to evaluate since her sxs occur on a daily basis with or without activity. 5. Chronic anemia due to ESRD procrit resistant 6. T2DM managed by Nitin Louie DO 
 
7. Morbid Obesity 8. Chronic anticoagulation due to previous DVT. Continue Coumadin. INR being followed by Dr. Jeevan Lau. Left heart cath near future Right femoral approach Stop Coumadin 4 days prior ASA 3 Airway 3 She will require pre-cath treatment with Steroids and Benadryl due to Iodine allergy Follow-up Disposition: Not on File Subjective: 
Ms. Raheem Jeffers reports significant shortness of breath, chest pain/pressure and tachycardia with any activity, which has significantly worsened from previous. She uses Nitro, with improvement in chest pain. She states her oxygen levels also decrease with walking. She no longer uses inhalers, except Albuterol PRN. She has not been recording her CardioMEMS data since November. She also reports \"terrible\" palpitations, which can occur at rest. Episodes occur every day. She is adherent with dialysis 3x/week (MWF). She does not urinate on her own. Her blood pressure occasionally drops to 73/47. Patient denies any syncope, orthopnea, or paroxysmal nocturnal dyspnea. She has some swelling in her right hand in the evenings. She is adherent with her CPAP nightly. She is here today with her . Cardiac risk factors HTN yes DM yes Cardiac testing CATH: 2004: 1v CAD by cath - PCI OM with SAL 
CATH 5/2004 - patent stent, no new disease Lexiscan cardiolite 12/09- normal perfusion, EF 66% ECHO 11/2009: LVH, EF 14%, diastolic dysfunction STRESS/LEXISCAN/CARDIOLITE 3/29/11: normal perfusion, EF 62% CATH: 3/20/2012 :PA 55/30 mean 41, wedge 26, RA 24, EDP 35, LVG 55%, LM normal, LAD normal, LCX - OM1 patent stent, OM2 prox 85% long, % w/ L -> R collateral; s/p SAL-> OM2/OM3 with SAL. CATH: 10/4/2012: EDP 25, EF 55%, LM nl, LAD mild plaque, LCX patent OM stents, % prox with good L to R collaterals.  
Cath 3/18/2014 - RA 9 PA 42/19/29, PCW 12, EDP 25, no LVG due to CKD, LM nl, LAD mild plaque, LCX patent stents OM1/OM2, RCA chronic proximal occlusion with L to R collaterals. ECHO 4/11/16: EF 60-65%. No WMA. LA mildly dilated. Lexiscan Cardiolite 4/11/16: Normal. LVEF 55%. Compared to 3/29/11, no significant changes. RHC/CardioMEMS implant 8/31/17 - RA 12, PA 56/20/30, CI ([place]) 2.65 Echo 11/30/17 - EF 65%. G1DD. No WMA. Wall thickness was mildly to moderately increased. Limited Echo 12/13/17 - Tricuspid valve: Pulmonary artery diastolic pressure: 25.50 mmHg. [address] 6/5/18 Moderate-severe PA HTN, post capillary Marked elevation in biV filling pressures Event Monitor in 8/2018 demonstrated frequent PVCs Past Medical History:  
Diagnosis Date   Sleep Apnea 2/16/2011  Aortic aneurysm ([address]  CAD (coronary Artery Disease) Native Artery 2/16/2011  CKD (chronic kidney disease) stage 4, GFR 15-29 ml/min ([facility] 2/10/2017  COPD (chronic obstructive pulmonary disease) ([facility])  Diabetic gastroparesis ([address]  Diastolic heart failure ([address] 10/5/2012  DM type 2 causing neurological disease ([address]  DM type 2 causing renal disease ([address]  DM type 2 causing vascular disease ([address]  Esophageal stricture 2012  
 dialted Dr. Guevara Sin  G6PD deficiency ([facility])   
 trait  GERD (gastroesophageal reflux disease)  Gout  ILD (interstitial lung disease) ([address] open lung bx CJW 2010  Morbid obesity ([address]  OA (osteoarthritis)  Ovarian cancer ([address] cervical and uterine  Rheumatoid arteritis  S/P left pulmonary artery pressure sensor implant placement 8/31/2017 [facility]  Tobacco use disorder 3/21/2012  Uterine cervix cancer ([address]  Vitamin D deficiency 8/9/2013 Current Outpatient Medications Medication Sig Dispense Refill  warfarin (COUMADIN) 3 mg tablet Take one tablet daily except Fridays.  amLODIPine (NORVASC) 5 mg tablet Take 5 mg by mouth daily.  oxyCODONE-acetaminophen (PERCOCET) 7.5-325 mg per tablet Take 1 Tab by mouth two (2) times daily as needed for Pain. Max Daily Amount: 2 Tabs. May fill today 10/4/18 60 Tab 0  
 isosorbide mononitrate ER (IMDUR) 120 mg CR tablet TAKE 1 TABLET BY MOUTH ONCE DAILY 90 Tab 0  
 allopurinol (ZYLOPRIM) 100 mg tablet TAKE 1 TABLET BY MOUTH EVERY DAY 30 Tab 5  
 nitroglycerin (NITROSTAT) 0.3 mg SL tablet 1 Tab by SubLINGual route every five (5) minutes as needed for Chest Pain. 1 Bottle 1  
 carvedilol (COREG) 25 mg tablet Take 1.5 Tabs by mouth two (2) times daily (with meals). 180 Tab 3  pantoprazole (PROTONIX) 40 mg tablet TAKE 1 TABLET BY MOUTH EVERY DAY FOR HEARTBURN 90 Tab 3  
 albuterol (PROAIR HFA) 90 mcg/actuation inhaler Take 2 Puffs by inhalation every four (4) hours as needed for Wheezing.  albuterol (PROVENTIL VENTOLIN) 2.5 mg /3 mL (0.083 %) nebulizer solution 2.5 mg by Nebulization route every four (4) hours as needed.  insulin aspart protamine/insulin aspart (NOVOLOG MIX 70-30) 100 unit/mL (70-30) injection by SubCUTAneous route three (3) times daily as needed (for BG  > 140 mg/dL). Uses sliding scale  ergocalciferol (VITAMIN D2) 50,000 unit capsule Take 50,000 Units by mouth every Tuesday.  aspirin 81 mg tablet Take 81 mg by mouth daily.  atorvastatin (LIPITOR) 80 mg tablet Take 80 mg by mouth nightly.  lubiPROStone (AMITIZA) 24 mcg capsule Take 1 Cap by mouth two (2) times daily (with meals). 60 Cap 2  warfarin (COUMADIN) 2 mg tablet Take 1 Tab by mouth daily. Skip 11/8-11/9 restart 11/10/18 90 Tab 3  
 lidocaine (XYLOCAINE) 5 % ointment Apply  to affected area as needed for Pain. 60 g 2  
 ALLERGY 25 mg capsule TAKE 2 CAPSULES BY MOUTH 1 HOUR PRIOR TO PROCEDURE  0  
 calcitRIOL (ROCALTROL) 0.25 mcg capsule Take 0.25 mcg by mouth every Monday, Wednesday, Friday, Saturday. Allergies Allergen Reactions  Contrast Dye [Iodine] Anaphylaxis Tolerates when pre medicated w/ IV solumedrol and benadryl prior to procedure  Levaquin [Levofloxacin] Nausea and Vomiting  Morphine Hives and Itching Review of Systems Constitutional: Negative for fever, chills, malaise/fatigue and diaphoresis. Respiratory: Negative for cough, hemoptysis, sputum production, and wheezing. +LARA, exertional hypoxia Cardiovascular: Negative for orthopnea, claudication, leg swelling and PND. +exertional chest pain/pressure, palpitations Gastrointestinal: Negative for heartburn, nausea, vomiting, blood in stool and melena. Genitourinary: Negative for dysuria and flank pain. Musculoskeletal: Negative for joint pain and back pain. +right hand swelling Skin: Negative for rash. Neurological: Negative for focal weakness, seizures, loss of consciousness, weakness and headaches. Endo/Heme/Allergies: Does not bruise/bleed easily. Psychiatric/Behavioral: Negative for memory loss. The patient does not have insomnia. Physical Exam: 
Visit Vitals /62 (BP 1 Location: Right arm) Pulse 68 Resp 20 Ht 5' 10\" (1.778 m) Wt 303 lb 6.4 oz (137.6 kg) SpO2 98% BMI 43.53 kg/m² Wt Readings from Last 3 Encounters:  
01/08/19 303 lb 6.4 oz (137.6 kg) 01/03/19 301 lb (136.5 kg)  
11/13/18 310 lb (140.6 kg) General - well developed well nourished, morbidly obese BF Neck - JVP difficult to assess, thyroid nl Cardiac - normal S1, S2, no murmurs, rubs or gallops. No clicks Vascular - carotids without bruits, radials, femorals and pedal pulses equal bilateral 
Lungs - clear to auscultation bilaterals, no rales, wheezing or rhonchi Abd - soft nontender, no HSM, no abd bruits Extremities - 1-2+ pedal edema and pre-tibial edema. Skin - no rash Neuro - nonfocal 
Psych - normal mood and affect Cardiographics Event Monitor in 8/2018 demonstrated frequent PVCs Written by Genette Jewel, as dictated by Dr. Delmar Polanco. Blake Sidhu MD

## 2019-01-08 NOTE — PROGRESS NOTES
Nora Rodas, [address]  [address],  [address]  [place], [address] [facility]    Office [phone]  Fax [phone]  Cell [phone]        Jose M Reynoso is a 66 y.o. female. Last seen 3 months ago. Assessment  Encounter Diagnoses   Name Primary?  Atherosclerosis of native coronary artery of native heart with stable angina pectoris ([address] Yes    Type 2 diabetes mellitus with chronic kidney disease on chronic dialysis, with long-term current use of insulin (HCC)     Essential hypertension     JASEN on CPAP     Pulmonary HTN     (HFpEF) heart failure with preserved ejection fraction (HCC)     S/P left pulmonary artery pressure sensor implant placement     Palpitations     Frequent PVCs     COPD, severe ([address]     IL[facility]inter[facility]) [facility]     Obstructive sleep apnea syndrome     ESRD on hemodialysis ([facility]     Morbid obesity     Warfarin anticoagulation     Hx of deep venous thrombosis     Normocytic anemia        Recommendations:  1. HFpEF, class 3 with moderate to severe PA HTN, s/p cardioMEMS. PAD in 11/2018 was 25-26. She has not had any recent readings. Volume is being managed by dialysis. She is no longer on diuretic therapy. I have asked to resume [facility]. 2. Chronic lung disease, oxygen dependent. She is adherent with CPAP. 3. CAD, RCA  by cath 2014. Lexiscan cardiolite 2016 normal. She has class 3 anginal sxs associated with palpitations. Her sxs are much worse. Favor repeat cath at this time. Right radial cath was attempted last year without success. She has an AV fistula in her right arm. Favor right femoral approach. Will arrange with Dr. Mathew Muniz, preferably on a dialysis day. 4. Symptomatic PVCs. I am concerned about AF as well. Will get 24 hour Holter to evaluate since her sxs occur on a daily basis with or without activity. 5. Chronic anemia due to ESRD procrit resistant    6.  T2DM managed by Lobb, Hallie SANDOVAL, DO    7. Morbid Obesity    8. Chronic anticoagulation due to previous DVT. Continue Coumadin. INR being followed by Dr. Kenzie Winn. Left heart cath near future  Right femoral approach   Stop Coumadin 4 days prior  ASA 3  Airway 3  She will require pre-cath treatment with Steroids and Benadryl due to Iodine allergy    Follow-up Disposition: Not on File     Subjective:  Ms. Mya Toribio reports significant shortness of breath, chest pain/pressure and tachycardia with any activity, which has significantly worsened from previous. She uses Nitro, with improvement in chest pain. She states her oxygen levels also decrease with walking. She no longer uses inhalers, except Albuterol PRN. She has not been recording her CardioMEMS data since November. She also reports \"terrible\" palpitations, which can occur at rest. Episodes occur every day. She is adherent with dialysis 3x/week (MWF). She does not urinate on her own. Her blood pressure occasionally drops to 73/47. Patient denies any syncope, orthopnea, or paroxysmal nocturnal dyspnea. She has some swelling in her right hand in the evenings. She is adherent with her CPAP nightly. She is here today with her . Cardiac risk factors   HTN yes  DM yes    Cardiac testing  CATH: 2004: 1v CAD by cath - PCI OM with SAL  CATH 5/2004 - patent stent, no new disease   Lexiscan cardiolite 12/09- normal perfusion, EF 66%  ECHO 11/2009: LVH, EF 17%, diastolic dysfunction  STRESS/LEXISCAN/CARDIOLITE 3/29/11: normal perfusion, EF 62%  CATH: 3/20/2012 :PA 55/30 mean 41, wedge 26, RA 24, EDP 35, LVG 55%, LM normal, LAD normal, LCX - OM1 patent stent, OM2 prox 85% long, % w/ L -> R collateral; s/p SAL-> OM2/OM3 with SAL. CATH: 10/4/2012: EDP 25, EF 55%, LM nl, LAD mild plaque, LCX patent OM stents, % prox with good L to R collaterals.   Cath 3/18/2014 - RA 9 PA 42/19/29, PCW 12, EDP 25, no LVG due to CKD, LM nl, LAD mild plaque, LCX patent stents OM1/OM2, RCA chronic proximal occlusion with L to R collaterals. ECHO 4/11/16: EF 60-65%. No WMA. LA mildly dilated. Lexiscan Cardiolite 4/11/16: Normal. LVEF 55%. Compared to 3/29/11, no significant changes. RHC/CardioMEMS implant 8/31/17 - RA 12, PA 56/20/30, CI (Mayte) 2.65    Echo 11/30/17 - EF 65%. G1DD. No WMA. Wall thickness was mildly to moderately increased. Limited Echo 12/13/17 - Tricuspid valve: Pulmonary artery diastolic pressure: 74.98 mmHg. [address] 6/5/18 Moderate-severe PA HTN, post capillary  Marked elevation in biV filling pressures    Event Monitor in 8/2018 demonstrated frequent PVCs    Past Medical History:   Diagnosis Date     Sleep Apnea 2/16/2011    Aortic aneurysm ([facility])     CAD (coronary Artery Disease) Native Artery 2/16/2011    CKD (chronic kidney disease) stage 4, GFR 15-29 ml/min ([facility]) 2/10/2017    COPD (chronic obstructive pulmonary disease) ([facility])     Diabetic gastroparesis ([facility])     Diastolic heart failure ([address] 10/5/2012    DM type 2 causing neurological disease ([address]     DM type 2 causing renal disease ([address]     DM type 2 causing vascular disease ([address]     Esophageal stricture 2012    dialted Dr. Brant Bhagat G6PD deficiency ([address]     trait    GERD (gastroesophageal reflux disease)     Gout     ILD (interstitial lung disease) ([address]     open lung bx CJW 2010    Morbid obesity ([address]     OA (osteoarthritis)     Ovarian cancer ([address]     cervical and uterine    Rheumatoid arteritis     S/P left pulmonary artery pressure sensor implant placement 8/31/2017    Cardiomems     Tobacco use disorder 3/21/2012    Uterine cervix cancer ([address]     Vitamin D deficiency 8/9/2013        Current Outpatient Medications   Medication Sig Dispense Refill    warfarin (COUMADIN) 3 mg tablet Take one tablet daily except Fridays.  amLODIPine (NORVASC) 5 mg tablet Take 5 mg by mouth daily.       oxyCODONE-acetaminophen (PERCOCET) 7.5-325 mg per tablet Take 1 Tab by mouth two (2) times daily as needed for Pain. Max Daily Amount: 2 Tabs. May fill today 10/4/18 60 Tab 0    isosorbide mononitrate ER (IMDUR) 120 mg CR tablet TAKE 1 TABLET BY MOUTH ONCE DAILY 90 Tab 0    allopurinol (ZYLOPRIM) 100 mg tablet TAKE 1 TABLET BY MOUTH EVERY DAY 30 Tab 5    nitroglycerin (NITROSTAT) 0.3 mg SL tablet 1 Tab by SubLINGual route every five (5) minutes as needed for Chest Pain. 1 Bottle 1    carvedilol (COREG) 25 mg tablet Take 1.5 Tabs by mouth two (2) times daily (with meals). 180 Tab 3    pantoprazole (PROTONIX) 40 mg tablet TAKE 1 TABLET BY MOUTH EVERY DAY FOR HEARTBURN 90 Tab 3    albuterol (PROAIR HFA) 90 mcg/actuation inhaler Take 2 Puffs by inhalation every four (4) hours as needed for Wheezing.  albuterol (PROVENTIL VENTOLIN) 2.5 mg /3 mL (0.083 %) nebulizer solution 2.5 mg by Nebulization route every four (4) hours as needed.  insulin aspart protamine/insulin aspart (NOVOLOG MIX 70-30) 100 unit/mL (70-30) injection by SubCUTAneous route three (3) times daily as needed (for BG  > 140 mg/dL). Uses sliding scale       ergocalciferol (VITAMIN D2) 50,000 unit capsule Take 50,000 Units by mouth every Tuesday.  aspirin 81 mg tablet Take 81 mg by mouth daily.  atorvastatin (LIPITOR) 80 mg tablet Take 80 mg by mouth nightly.  lubiPROStone (AMITIZA) 24 mcg capsule Take 1 Cap by mouth two (2) times daily (with meals). 60 Cap 2    warfarin (COUMADIN) 2 mg tablet Take 1 Tab by mouth daily. Skip 11/8-11/9 restart 11/10/18 90 Tab 3    lidocaine (XYLOCAINE) 5 % ointment Apply  to affected area as needed for Pain. 60 g 2    ALLERGY 25 mg capsule TAKE 2 CAPSULES BY MOUTH 1 HOUR PRIOR TO PROCEDURE  0    calcitRIOL (ROCALTROL) 0.25 mcg capsule Take 0.25 mcg by mouth every Monday, Wednesday, Friday, Saturday.          Allergies   Allergen Reactions    Contrast Dye [Iodine] Anaphylaxis     Tolerates when pre medicated w/ IV solumedrol and benadryl prior to procedure     Levaquin [Levofloxacin] Nausea and Vomiting    Morphine Hives and Itching      Review of Systems  Constitutional: Negative for fever, chills, malaise/fatigue and diaphoresis. Respiratory: Negative for cough, hemoptysis, sputum production, and wheezing. +LARA, exertional hypoxia  Cardiovascular: Negative for orthopnea, claudication, leg swelling and PND. +exertional chest pain/pressure, palpitations  Gastrointestinal: Negative for heartburn, nausea, vomiting, blood in stool and melena. Genitourinary: Negative for dysuria and flank pain. Musculoskeletal: Negative for joint pain and back pain. +right hand swelling  Skin: Negative for rash. Neurological: Negative for focal weakness, seizures, loss of consciousness, weakness and headaches. Endo/Heme/Allergies: Does not bruise/bleed easily. Psychiatric/Behavioral: Negative for memory loss. The patient does not have insomnia. Physical Exam:  Visit Vitals  /62 (BP 1 Location: Right arm)   Pulse 68   Resp 20   Ht 5' 10\" (1.778 m)   Wt 303 lb 6.4 oz (137.6 kg)   SpO2 98%   BMI 43.53 kg/m²     Wt Readings from Last 3 Encounters:   01/08/19 303 lb 6.4 oz (137.6 kg)   01/03/19 301 lb (136.5 kg)   11/13/18 310 lb (140.6 kg)      General - well developed well nourished, morbidly obese BF  Neck - JVP difficult to assess, thyroid nl  Cardiac - normal S1, S2, no murmurs, rubs or gallops. No clicks  Vascular - carotids without bruits, radials, femorals and pedal pulses equal bilateral  Lungs - clear to auscultation bilaterals, no rales, wheezing or rhonchi  Abd - soft nontender, no HSM, no abd bruits  Extremities - 1-2+ pedal edema and pre-tibial edema. Skin - no rash  Neuro - nonfocal  Psych - normal mood and affect      Cardiographics  Event Monitor in 8/2018 demonstrated frequent PVCs    Written by Margarita Spence, as dictated by Dr. Samuel Cannon.      Samuel Cannon MD

## 2019-01-15 ENCOUNTER — TELEPHONE (OUTPATIENT)
Dept: CARDIOLOGY CLINIC | Age: 64
End: 2019-01-15

## 2019-01-15 DIAGNOSIS — R00.2 PALPITATIONS: Primary | ICD-10-CM

## 2019-01-15 RX ORDER — SODIUM CHLORIDE 0.9 % (FLUSH) 0.9 %
5-40 SYRINGE (ML) INJECTION AS NEEDED
Status: CANCELLED | OUTPATIENT
Start: 2019-01-16

## 2019-01-15 RX ORDER — SODIUM CHLORIDE 0.9 % (FLUSH) 0.9 %
5-40 SYRINGE (ML) INJECTION EVERY 8 HOURS
Status: CANCELLED | OUTPATIENT
Start: 2019-01-16

## 2019-01-15 NOTE — TELEPHONE ENCOUNTER
Spoke with pt concerning [facility] procedure.     Instructions given to pt per VO Dr. Aman Hernandez. Pt. NPO after midnight; have someone available to drive pt to and from procedure; pack a bag in case an overnight stay is warranted. Also, verified patient stopped taking her Coumadin 5 days prior to procedure patient stopped on 1/12/19.     [facility] scheduled for 12:30 pm on 1/16/19. Patient advised to arrive 2 hrs prior to procedure for prep.     Patient verbalized understanding.     Opportunities for questions, clarifications, and concerns provided.

## 2019-01-16 ENCOUNTER — HOSPITAL ENCOUNTER (OUTPATIENT)
Dept: CARDIAC CATH/INVASIVE PROCEDURES | Age: 64
Discharge: HOME | End: 2019-01-16
Attending: STUDENT IN AN ORGANIZED HEALTH CARE EDUCATION/TRAINING PROGRAM | Admitting: STUDENT IN AN ORGANIZED HEALTH CARE EDUCATION/TRAINING PROGRAM
Payer: MEDICARE

## 2019-01-16 VITALS
WEIGHT: 293 LBS | TEMPERATURE: 98.2 F | HEART RATE: 80 BPM | DIASTOLIC BLOOD PRESSURE: 61 MMHG | HEIGHT: 70 IN | SYSTOLIC BLOOD PRESSURE: 128 MMHG | BODY MASS INDEX: 41.95 KG/M2

## 2019-01-16 DIAGNOSIS — R00.2 PALPITATIONS: ICD-10-CM

## 2019-01-16 LAB
GLUCOSE BLD STRIP.AUTO-MCNC: 96 MG/DL (ref 65–100)
INR BLD: 2.1 (ref 0.9–1.2)
SERVICE CMNT-IMP: NORMAL

## 2019-01-16 PROCEDURE — 82962 GLUCOSE BLOOD TEST: CPT

## 2019-01-16 PROCEDURE — 85610 PROTHROMBIN TIME: CPT

## 2019-01-16 RX ORDER — SODIUM CHLORIDE 0.9 % (FLUSH) 0.9 %
5-40 SYRINGE (ML) INJECTION AS NEEDED
Status: DISCONTINUED | OUTPATIENT
Start: 2019-01-16 | End: 2019-01-16 | Stop reason: HOSPADM

## 2019-01-16 RX ORDER — DIPHENHYDRAMINE HYDROCHLORIDE 50 MG/ML
50 INJECTION, SOLUTION INTRAMUSCULAR; INTRAVENOUS ONCE
Status: CANCELLED | OUTPATIENT
Start: 2019-01-16 | End: 2019-01-16

## 2019-01-16 RX ORDER — SODIUM CHLORIDE 0.9 % (FLUSH) 0.9 %
5-40 SYRINGE (ML) INJECTION EVERY 8 HOURS
Status: DISCONTINUED | OUTPATIENT
Start: 2019-01-16 | End: 2019-01-16 | Stop reason: HOSPADM

## 2019-01-16 RX ORDER — HYDROCORTISONE SODIUM SUCCINATE 100 MG/2ML
100 INJECTION, POWDER, FOR SOLUTION INTRAMUSCULAR; INTRAVENOUS ONCE
Status: CANCELLED | OUTPATIENT
Start: 2019-01-16 | End: 2019-01-16

## 2019-01-16 NOTE — PROGRESS NOTES
Patient arrived. ID and allergies verified verbally with patient. Pt voices understanding of procedure to be performed. Consent obtained. Pt prepped for procedure. CATH  POSS PCI 
 
11:00 AM 
Unable to gain IV access Dr Sheridan Hsu notified. POC INR = 2.1 Dr Sheridan Hsu notified. Cath schedule cancelled . Will reschedule on Friday. Patient informed. 11:35 AM 
Per patient she called [facility] for her HD time and the clinic gave her HD chair time back. 11:45 AM  
Patient discharged via wheelchair with O2 @2L/NC on patient's own Oxygen tank she came in with. NO visible distress.  with patient at discharge.

## 2019-01-17 ENCOUNTER — TELEPHONE (OUTPATIENT)
Dept: CARDIOLOGY CLINIC | Age: 64
End: 2019-01-17

## 2019-01-17 DIAGNOSIS — R00.2 PALPITATIONS: Primary | ICD-10-CM

## 2019-01-17 RX ORDER — SODIUM CHLORIDE 0.9 % (FLUSH) 0.9 %
5-40 SYRINGE (ML) INJECTION AS NEEDED
Status: CANCELLED | OUTPATIENT
Start: 2019-01-18

## 2019-01-17 RX ORDER — ASPIRIN 325 MG
325 TABLET ORAL DAILY
Status: CANCELLED | OUTPATIENT
Start: 2019-01-18

## 2019-01-17 RX ORDER — SODIUM CHLORIDE 0.9 % (FLUSH) 0.9 %
5-40 SYRINGE (ML) INJECTION EVERY 8 HOURS
Status: CANCELLED | OUTPATIENT
Start: 2019-01-18

## 2019-01-17 RX ORDER — SODIUM CHLORIDE 9 MG/ML
100 INJECTION, SOLUTION INTRAVENOUS CONTINUOUS
Status: CANCELLED | OUTPATIENT
Start: 2019-01-18

## 2019-01-17 NOTE — TELEPHONE ENCOUNTER
Pt called with questions regarding getting her Dialysis set up, along with her cardiac cath, which is scheduled on tomorrow.   Phone # [phone]  Thanks

## 2019-01-17 NOTE — TELEPHONE ENCOUNTER
Returned patient's call told her I would have to check with Dr Liliana Downey about patient receiving dialysis after her Cardiac Cath tomorrow. Patient stated Dr. Jeremiah Zambrano was going to take care of the orders for her to stay over night to get it done. I told her I would check with Dr Joanie Koch tomorrow morning and get back with her.

## 2019-01-18 ENCOUNTER — TELEPHONE (OUTPATIENT)
Dept: CARDIOLOGY CLINIC | Age: 64
End: 2019-01-18

## 2019-01-18 ENCOUNTER — HOSPITAL ENCOUNTER (OUTPATIENT)
Dept: CARDIAC CATH/INVASIVE PROCEDURES | Age: 64
Setting detail: OBSERVATION
Discharge: HOME | End: 2019-01-19
Attending: STUDENT IN AN ORGANIZED HEALTH CARE EDUCATION/TRAINING PROGRAM | Admitting: STUDENT IN AN ORGANIZED HEALTH CARE EDUCATION/TRAINING PROGRAM
Payer: MEDICARE

## 2019-01-18 DIAGNOSIS — R00.2 PALPITATIONS: ICD-10-CM

## 2019-01-18 PROBLEM — I20.0 UNSTABLE ANGINA (HCC): Status: ACTIVE | Noted: 2019-01-18

## 2019-01-18 LAB
ACT BLD: 257 SECS (ref 79–138)
ANION GAP SERPL CALC-SCNC: 11 MMOL/L (ref 5–15)
BUN SERPL-MCNC: 55 MG/DL (ref 6–20)
BUN/CREAT SERPL: 6 (ref 12–20)
CALCIUM SERPL-MCNC: 8.6 MG/DL (ref 8.5–10.1)
CHLORIDE SERPL-SCNC: 101 MMOL/L (ref 97–108)
CO2 SERPL-SCNC: 29 MMOL/L (ref 21–32)
CREAT SERPL-MCNC: 9.61 MG/DL (ref 0.55–1.02)
ERYTHROCYTE [DISTWIDTH] IN BLOOD BY AUTOMATED COUNT: 15 % (ref 11.5–14.5)
GLUCOSE BLD STRIP.AUTO-MCNC: 112 MG/DL (ref 65–100)
GLUCOSE BLD STRIP.AUTO-MCNC: 114 MG/DL (ref 65–100)
GLUCOSE BLD STRIP.AUTO-MCNC: 188 MG/DL (ref 65–100)
GLUCOSE SERPL-MCNC: 111 MG/DL (ref 65–100)
HCT VFR BLD AUTO: 30.4 % (ref 35–47)
HGB BLD-MCNC: 9.1 G/DL (ref 11.5–16)
INR BLD: 1.7 (ref 0.9–1.2)
MCH RBC QN AUTO: 31.8 PG (ref 26–34)
MCHC RBC AUTO-ENTMCNC: 29.9 G/DL (ref 30–36.5)
MCV RBC AUTO: 106.3 FL (ref 80–99)
NRBC # BLD: 0 K/UL (ref 0–0.01)
NRBC BLD-RTO: 0 PER 100 WBC
PLATELET # BLD AUTO: 292 K/UL (ref 150–400)
PMV BLD AUTO: 9.4 FL (ref 8.9–12.9)
POTASSIUM SERPL-SCNC: 4.4 MMOL/L (ref 3.5–5.1)
RBC # BLD AUTO: 2.86 M/UL (ref 3.8–5.2)
SERVICE CMNT-IMP: ABNORMAL
SODIUM SERPL-SCNC: 141 MMOL/L (ref 136–145)
WBC # BLD AUTO: 9.5 K/UL (ref 3.6–11)

## 2019-01-18 PROCEDURE — 77030029065 HC DRSG HEMO QCLOT ZMED -B

## 2019-01-18 PROCEDURE — 74011250636 HC RX REV CODE- 250/636: Performed by: INTERNAL MEDICINE

## 2019-01-18 PROCEDURE — 74011250636 HC RX REV CODE- 250/636: Performed by: STUDENT IN AN ORGANIZED HEALTH CARE EDUCATION/TRAINING PROGRAM

## 2019-01-18 PROCEDURE — 85347 COAGULATION TIME ACTIVATED: CPT

## 2019-01-18 PROCEDURE — C1887 CATHETER, GUIDING: HCPCS

## 2019-01-18 PROCEDURE — 77030008543 HC TBNG MON PRSS MRTM -A

## 2019-01-18 PROCEDURE — 90935 HEMODIALYSIS ONE EVALUATION: CPT

## 2019-01-18 PROCEDURE — C1769 GUIDE WIRE: HCPCS

## 2019-01-18 PROCEDURE — 36415 COLL VENOUS BLD VENIPUNCTURE: CPT

## 2019-01-18 PROCEDURE — C1894 INTRO/SHEATH, NON-LASER: HCPCS

## 2019-01-18 PROCEDURE — 74011000250 HC RX REV CODE- 250: Performed by: STUDENT IN AN ORGANIZED HEALTH CARE EDUCATION/TRAINING PROGRAM

## 2019-01-18 PROCEDURE — 99218 HC RM OBSERVATION: CPT

## 2019-01-18 PROCEDURE — 80048 BASIC METABOLIC PNL TOTAL CA: CPT

## 2019-01-18 PROCEDURE — 99152 MOD SED SAME PHYS/QHP 5/>YRS: CPT

## 2019-01-18 PROCEDURE — 74011636320 HC RX REV CODE- 636/320: Performed by: STUDENT IN AN ORGANIZED HEALTH CARE EDUCATION/TRAINING PROGRAM

## 2019-01-18 PROCEDURE — 85027 COMPLETE CBC AUTOMATED: CPT

## 2019-01-18 PROCEDURE — 82962 GLUCOSE BLOOD TEST: CPT

## 2019-01-18 PROCEDURE — 74011250637 HC RX REV CODE- 250/637: Performed by: NURSE PRACTITIONER

## 2019-01-18 PROCEDURE — 77030019569 HC BND COMPR RAD TERU -B

## 2019-01-18 PROCEDURE — 77030004532 HC CATH ANGI DX IMP BSC -A

## 2019-01-18 PROCEDURE — 85610 PROTHROMBIN TIME: CPT

## 2019-01-18 RX ORDER — ISOSORBIDE MONONITRATE 60 MG/1
120 TABLET, EXTENDED RELEASE ORAL DAILY
Status: DISCONTINUED | OUTPATIENT
Start: 2019-01-19 | End: 2019-01-19 | Stop reason: HOSPADM

## 2019-01-18 RX ORDER — ALBUTEROL SULFATE 0.83 MG/ML
2.5 SOLUTION RESPIRATORY (INHALATION)
Status: DISCONTINUED | OUTPATIENT
Start: 2019-01-18 | End: 2019-01-19 | Stop reason: HOSPADM

## 2019-01-18 RX ORDER — LIDOCAINE HYDROCHLORIDE 10 MG/ML
10-20 INJECTION INFILTRATION; PERINEURAL
Status: DISCONTINUED | OUTPATIENT
Start: 2019-01-18 | End: 2019-01-18 | Stop reason: HOSPADM

## 2019-01-18 RX ORDER — AMLODIPINE BESYLATE 5 MG/1
5 TABLET ORAL DAILY
Status: DISCONTINUED | OUTPATIENT
Start: 2019-01-19 | End: 2019-01-19 | Stop reason: HOSPADM

## 2019-01-18 RX ORDER — ONDANSETRON 2 MG/ML
8 INJECTION INTRAMUSCULAR; INTRAVENOUS
Status: DISCONTINUED | OUTPATIENT
Start: 2019-01-18 | End: 2019-01-19

## 2019-01-18 RX ORDER — CARVEDILOL 12.5 MG/1
37.5 TABLET ORAL 2 TIMES DAILY WITH MEALS
Status: DISCONTINUED | OUTPATIENT
Start: 2019-01-18 | End: 2019-01-19 | Stop reason: HOSPADM

## 2019-01-18 RX ORDER — WARFARIN 2 MG/1
2 TABLET ORAL DAILY
Status: DISCONTINUED | OUTPATIENT
Start: 2019-01-19 | End: 2019-01-18

## 2019-01-18 RX ORDER — ONDANSETRON 2 MG/ML
4 INJECTION INTRAMUSCULAR; INTRAVENOUS
Status: DISCONTINUED | OUTPATIENT
Start: 2019-01-18 | End: 2019-01-18 | Stop reason: HOSPADM

## 2019-01-18 RX ORDER — ADENOSINE 3 MG/ML
6 INJECTION, SOLUTION INTRAVENOUS
Status: DISCONTINUED | OUTPATIENT
Start: 2019-01-18 | End: 2019-01-18

## 2019-01-18 RX ORDER — ASPIRIN 325 MG
325 TABLET ORAL DAILY
Status: DISCONTINUED | OUTPATIENT
Start: 2019-01-19 | End: 2019-01-18 | Stop reason: HOSPADM

## 2019-01-18 RX ORDER — DEXTROSE 50 % IN WATER (D50W) INTRAVENOUS SYRINGE
12.5-25 AS NEEDED
Status: DISCONTINUED | OUTPATIENT
Start: 2019-01-18 | End: 2019-01-19 | Stop reason: HOSPADM

## 2019-01-18 RX ORDER — SODIUM CHLORIDE 9 MG/ML
100 INJECTION, SOLUTION INTRAVENOUS CONTINUOUS
Status: DISCONTINUED | OUTPATIENT
Start: 2019-01-18 | End: 2019-01-18 | Stop reason: HOSPADM

## 2019-01-18 RX ORDER — HEPARIN SODIUM 1000 [USP'U]/ML
1000-10000 INJECTION, SOLUTION INTRAVENOUS; SUBCUTANEOUS
Status: DISCONTINUED | OUTPATIENT
Start: 2019-01-18 | End: 2019-01-18 | Stop reason: HOSPADM

## 2019-01-18 RX ORDER — PANTOPRAZOLE SODIUM 40 MG/1
40 TABLET, DELAYED RELEASE ORAL
Status: DISCONTINUED | OUTPATIENT
Start: 2019-01-18 | End: 2019-01-19 | Stop reason: HOSPADM

## 2019-01-18 RX ORDER — HYDROCORTISONE SODIUM SUCCINATE 100 MG/2ML
100 INJECTION, POWDER, FOR SOLUTION INTRAMUSCULAR; INTRAVENOUS ONCE
Status: COMPLETED | OUTPATIENT
Start: 2019-01-18 | End: 2019-01-18

## 2019-01-18 RX ORDER — ATORVASTATIN CALCIUM 20 MG/1
80 TABLET, FILM COATED ORAL
Status: DISCONTINUED | OUTPATIENT
Start: 2019-01-18 | End: 2019-01-19 | Stop reason: HOSPADM

## 2019-01-18 RX ORDER — NITROGLYCERIN 0.4 MG/1
0.4 TABLET SUBLINGUAL
Status: DISCONTINUED | OUTPATIENT
Start: 2019-01-18 | End: 2019-01-19 | Stop reason: HOSPADM

## 2019-01-18 RX ORDER — OXYCODONE AND ACETAMINOPHEN 7.5; 325 MG/1; MG/1
1 TABLET ORAL
Status: DISCONTINUED | OUTPATIENT
Start: 2019-01-18 | End: 2019-01-19 | Stop reason: HOSPADM

## 2019-01-18 RX ORDER — SODIUM CHLORIDE 0.9 % (FLUSH) 0.9 %
5-40 SYRINGE (ML) INJECTION EVERY 8 HOURS
Status: DISCONTINUED | OUTPATIENT
Start: 2019-01-18 | End: 2019-01-18 | Stop reason: HOSPADM

## 2019-01-18 RX ORDER — MAGNESIUM SULFATE 100 %
4 CRYSTALS MISCELLANEOUS AS NEEDED
Status: DISCONTINUED | OUTPATIENT
Start: 2019-01-18 | End: 2019-01-19 | Stop reason: HOSPADM

## 2019-01-18 RX ORDER — INSULIN LISPRO 100 [IU]/ML
INJECTION, SOLUTION INTRAVENOUS; SUBCUTANEOUS
Status: DISCONTINUED | OUTPATIENT
Start: 2019-01-18 | End: 2019-01-19 | Stop reason: HOSPADM

## 2019-01-18 RX ORDER — DIPHENHYDRAMINE HYDROCHLORIDE 50 MG/ML
50 INJECTION, SOLUTION INTRAMUSCULAR; INTRAVENOUS ONCE
Status: COMPLETED | OUTPATIENT
Start: 2019-01-18 | End: 2019-01-18

## 2019-01-18 RX ORDER — VERAPAMIL HYDROCHLORIDE 2.5 MG/ML
5 INJECTION, SOLUTION INTRAVENOUS
Status: DISCONTINUED | OUTPATIENT
Start: 2019-01-18 | End: 2019-01-18 | Stop reason: HOSPADM

## 2019-01-18 RX ORDER — MIDAZOLAM HYDROCHLORIDE 1 MG/ML
.5-5 INJECTION, SOLUTION INTRAMUSCULAR; INTRAVENOUS
Status: DISCONTINUED | OUTPATIENT
Start: 2019-01-18 | End: 2019-01-18 | Stop reason: HOSPADM

## 2019-01-18 RX ORDER — HEPARIN SODIUM 200 [USP'U]/100ML
500 INJECTION, SOLUTION INTRAVENOUS ONCE
Status: COMPLETED | OUTPATIENT
Start: 2019-01-18 | End: 2019-01-18

## 2019-01-18 RX ORDER — SODIUM CHLORIDE 0.9 % (FLUSH) 0.9 %
5-40 SYRINGE (ML) INJECTION AS NEEDED
Status: DISCONTINUED | OUTPATIENT
Start: 2019-01-18 | End: 2019-01-18 | Stop reason: HOSPADM

## 2019-01-18 RX ORDER — FENTANYL CITRATE 50 UG/ML
25-100 INJECTION, SOLUTION INTRAMUSCULAR; INTRAVENOUS
Status: DISCONTINUED | OUTPATIENT
Start: 2019-01-18 | End: 2019-01-18 | Stop reason: HOSPADM

## 2019-01-18 RX ORDER — ERGOCALCIFEROL 1.25 MG/1
50000 CAPSULE ORAL
Status: DISCONTINUED | OUTPATIENT
Start: 2019-01-22 | End: 2019-01-19 | Stop reason: HOSPADM

## 2019-01-18 RX ORDER — ONDANSETRON 2 MG/ML
4 INJECTION INTRAMUSCULAR; INTRAVENOUS
Status: DISCONTINUED | OUTPATIENT
Start: 2019-01-18 | End: 2019-01-19

## 2019-01-18 RX ORDER — SODIUM CHLORIDE 0.9 % (FLUSH) 0.9 %
5-40 SYRINGE (ML) INJECTION AS NEEDED
Status: DISCONTINUED | OUTPATIENT
Start: 2019-01-18 | End: 2019-01-19 | Stop reason: HOSPADM

## 2019-01-18 RX ORDER — ALLOPURINOL 100 MG/1
100 TABLET ORAL DAILY
Status: DISCONTINUED | OUTPATIENT
Start: 2019-01-19 | End: 2019-01-19 | Stop reason: HOSPADM

## 2019-01-18 RX ORDER — SODIUM CHLORIDE 0.9 % (FLUSH) 0.9 %
5-40 SYRINGE (ML) INJECTION EVERY 8 HOURS
Status: DISCONTINUED | OUTPATIENT
Start: 2019-01-18 | End: 2019-01-19 | Stop reason: HOSPADM

## 2019-01-18 RX ORDER — INSULIN ASPART 100 [IU]/ML
10-40 INJECTION, SUSPENSION SUBCUTANEOUS
COMMUNITY
End: 2020-06-16

## 2019-01-18 RX ADMIN — ONDANSETRON 4 MG: 2 INJECTION INTRAMUSCULAR; INTRAVENOUS at 16:13

## 2019-01-18 RX ADMIN — WARFARIN SODIUM 3 MG: 2 TABLET ORAL at 22:43

## 2019-01-18 RX ADMIN — HEPARIN SODIUM 1000 UNITS: 200 INJECTION, SOLUTION INTRAVENOUS at 14:13

## 2019-01-18 RX ADMIN — ADENOSINE 6 MG: 3 INJECTION, SOLUTION INTRAVENOUS at 15:00

## 2019-01-18 RX ADMIN — HYDROCORTISONE SODIUM SUCCINATE 100 MG: 100 INJECTION, POWDER, FOR SOLUTION INTRAMUSCULAR; INTRAVENOUS at 13:05

## 2019-01-18 RX ADMIN — Medication 10 ML: at 22:46

## 2019-01-18 RX ADMIN — ERYTHROPOIETIN 10000 UNITS: 10000 INJECTION, SOLUTION INTRAVENOUS; SUBCUTANEOUS at 23:55

## 2019-01-18 RX ADMIN — OXYCODONE HYDROCHLORIDE AND ACETAMINOPHEN 1 TABLET: 7.5; 325 TABLET ORAL at 22:44

## 2019-01-18 RX ADMIN — MIDAZOLAM 1 MG: 1 INJECTION INTRAMUSCULAR; INTRAVENOUS at 14:57

## 2019-01-18 RX ADMIN — FENTANYL CITRATE 25 MCG: 50 INJECTION, SOLUTION INTRAMUSCULAR; INTRAVENOUS at 14:12

## 2019-01-18 RX ADMIN — IOPAMIDOL 215 ML: 755 INJECTION, SOLUTION INTRAVENOUS at 15:04

## 2019-01-18 RX ADMIN — ATORVASTATIN CALCIUM 80 MG: 20 TABLET, FILM COATED ORAL at 22:44

## 2019-01-18 RX ADMIN — MIDAZOLAM 1 MG: 1 INJECTION INTRAMUSCULAR; INTRAVENOUS at 14:21

## 2019-01-18 RX ADMIN — FENTANYL CITRATE 25 MCG: 50 INJECTION, SOLUTION INTRAMUSCULAR; INTRAVENOUS at 14:57

## 2019-01-18 RX ADMIN — LIDOCAINE HYDROCHLORIDE 10 ML: 10 INJECTION, SOLUTION INFILTRATION; PERINEURAL at 14:13

## 2019-01-18 RX ADMIN — HEPARIN SODIUM 5000 UNITS: 1000 INJECTION INTRAVENOUS; SUBCUTANEOUS at 14:17

## 2019-01-18 RX ADMIN — MIDAZOLAM 1 MG: 1 INJECTION INTRAMUSCULAR; INTRAVENOUS at 14:12

## 2019-01-18 RX ADMIN — ONDANSETRON 4 MG: 2 INJECTION INTRAMUSCULAR; INTRAVENOUS at 23:06

## 2019-01-18 RX ADMIN — HEPARIN SODIUM 1000 UNITS: 200 INJECTION, SOLUTION INTRAVENOUS at 14:14

## 2019-01-18 RX ADMIN — OXYCODONE HYDROCHLORIDE AND ACETAMINOPHEN 1 TABLET: 7.5; 325 TABLET ORAL at 15:35

## 2019-01-18 RX ADMIN — DIPHENHYDRAMINE HYDROCHLORIDE 50 MG: 50 INJECTION, SOLUTION INTRAMUSCULAR; INTRAVENOUS at 13:05

## 2019-01-18 RX ADMIN — PANTOPRAZOLE SODIUM 40 MG: 40 TABLET, DELAYED RELEASE ORAL at 22:43

## 2019-01-18 RX ADMIN — HEPARIN SODIUM 5000 UNITS: 1000 INJECTION INTRAVENOUS; SUBCUTANEOUS at 14:42

## 2019-01-18 RX ADMIN — FENTANYL CITRATE 25 MCG: 50 INJECTION, SOLUTION INTRAMUSCULAR; INTRAVENOUS at 14:21

## 2019-01-18 RX ADMIN — VERAPAMIL HYDROCHLORIDE 5 MG: 2.5 INJECTION INTRAVENOUS at 14:16

## 2019-01-18 NOTE — PROGRESS NOTES
1730 TRANSFER - IN REPORT: 
 
Verbal report received from [facility], [address] [facility] (name) on Moe Chisholm  being received from [facility] (unit) for routine progression of care Report consisted of patients Situation, Background, Assessment and  
Recommendations(SBAR). Information from the following report(s) SBAR, Kardex, Procedure Summary, Intake/Output and Recent Results was reviewed with the receiving nurse. Opportunity for questions and clarification was provided. Assessment completed upon patients arrival to unit and care assumed. 1750 Patient arrived to 5th floor, VS taken, patient settled and assessment done.

## 2019-01-18 NOTE — CONSULTS
[address]  MR#: [id number]  : 1955  ACCOUNT #: [de-identified]   DATE OF SERVICE: 2019    REASON FOR CONSULTATION:  ESRD. This is a 72-year-old -American female with the following medical problems:  1. Coronary artery disease. 2.  Diabetes mellitus. 3.  Hypertension. 4.  Sleep apnea. 5.  Pulmonary hypertension. 6.  History of CHF. 7.  Status post left pulmonary artery pressure implant placement. 8.  COPD. 9.  Interstitial lung disease. 10.  Morbid obesity. 11.  Chronic anticoagulation  12. History of DVT. 13.  Normocytic anemia. 14.  Status post left upper extremity arteriovenous fistula with elevation. HISTORY OF PRESENT ILLNESS:  This is a 72-year-old -American female who dialyzes on a Monday, Wednesday, Friday schedule at the [facility] unit. She is followed by my partner, Dr. Indira Thomas. She is currently dialyzing via a well-functioning left upper extremity arteriovenous fistula, which has been placed by Dr. Odalis Wright, previously. This did require elevation. She indicates that her last dialysis session was 2 days ago, performed uneventfully. She dialyzes 3-1/2 hours each session. She has been admitted to the hospital for an elective heart catheterization. This is being arranged by [facility]. She is seen in the preoperative holding area, without complaint. CURRENT MEDICATIONS:  Noted include allopurinol, amlodipine 5 mg daily, aspirin 325 mg daily,  atorvastatin, carvedilol 37.5 mg b.i.d., Benadryl, ergocalciferol 50,000 units each Tuesday, hydrocortisone precath, insulin, Imdur 120 mg daily, pantoprazole, warfarin 2 mg daily. PHYSICAL EXAMINATION:  GENERAL:  A pleasant elderly appearing female in bed.   VITAL SIGNS:  Most recent blood pressure documented is 129/47, pulse not documented, respiratory rate is 19, oxygen saturation 96%.  HEENT:  Head is normocephalic. Eyes show no scleral icterus. NECK:  Appears supple. LUNGS:  Clear to auscultation. HEART:  Shows no pericardial friction rub. ABDOMEN:  Obese and soft. EXTREMITIES:  Show no thigh edema. There is a good thrill and bruit noted at the left upper extremity arteriovenous fistula. NEUROLOGIC:  She is awake and alert and answers questions appropriately. LABORATORY DATA:  White blood cell count 9.5, hematocrit 30%, platelet count 647,210. Chemistry not available. ASSESSMENT AND PLAN:  The patient will be due for her routine dialysis today, to be given post-dialysis. We await the results of her serum potassium. She is going to be given intravenous fluids preprocedurally, and given that she has end-stage renal disease, this would not need to be given for \"renal preservation\", obviously. I will send a text to the patient's nurse to let her check with the cardiologist to see if this was the planned indication. As she is going to get diphenhydramine and steroids, and IV present, ostensibly also in place in case she has a reaction to the IV contrast.    Otherwise, I have contacted [facility]. We will plan to dialyze her per her routine orders. I have ordered some ALDO therapy as well. Thanks for the consult.       Rosanna Shone, MD       CGA / JEY  D: 01/18/2019 13:24     T: 01/18/2019 14:05  JOB #: [id number]  CC: Janie Walton _____

## 2019-01-18 NOTE — PROGRESS NOTES
Patient arrived. ID and allergies verified verbally with patient. Pt voices understanding of procedure to be performed. Consent obtained. Pt prepped for procedure. INR is 1.7,accucheck is 112. 
13:10,pt premedicated with Solucortef and Benadryl as ordered. PIV placed by ultrasound. 13:30,labs reviewed. TRANSFER - OUT REPORT: 
 
Verbal report given to CRISTINA Callejas(name) on Volodymyr Curtis  being transferred to cath(unit) for routine progression of care Report consisted of patients Situation, Background, Assessment and  
Recommendations(SBAR). Information from the following report(s) Procedure Summary was reviewed with the receiving nurse. Lines:  
Peripheral IV 01/18/19 Right Antecubital (Active) Opportunity for questions and clarification was provided. Patient transported with: 
 Registered Nurse TRANSFER - IN REPORT: 
 
Verbal report received from CRISTINA Callejas(name) on Volodymyr Curtis  being received from [facility](unit) for routine progression of care Report consisted of patients Situation, Background, Assessment and  
Recommendations(SBAR). Information from the following report(s) Procedure Summary was reviewed with the receiving nurse. Opportunity for questions and clarification was provided. Assessment completed upon patients arrival to unit and care assumed. 15:30,pt c/o knee/back pain 9/10,medicated with Percocet as ordered. 16:15,Zofran 4mg IVP given for nausea. 13 ml air released from TR Band. No bleeding or hematoma noted. Radial and Ulnar pulse on rt wrist palpable. Pt tolerated well. Will continue to monitor. 17:40,TRANSFER - IN REPORT: 
 
Verbal report received from CRISTINA Junior(name) on Volodymyr Curtis  being received from [facility](unit) for routine progression of care Report consisted of patients Situation, Background, Assessment and  
Recommendations(SBAR).   
 
Information from the following report(s) Procedure Summary was reviewed with the receiving nurse. Opportunity for questions and clarification was provided. Assessment completed upon patients arrival to unit and care assumed.

## 2019-01-18 NOTE — TELEPHONE ENCOUNTER
Called patient advised we do not have orders from Nephrologist for patient to have dialysis after her Cardiac Cath today. I told her she should contact her Nephrologist/Dialysis Center and see what they suggest she do in this situation. Patient was upset reinforced that   Dr Jay Jay Fernandez told her he would put the orders in for her to stay over night to receive dialysis.

## 2019-01-18 NOTE — PROGRESS NOTES
TRANSFER - IN REPORT: 
 
Verbal report received from [facility], [address] on Catrachita Schmid  [facility] for routine progression of care Report consisted of patients Situation, Background, Assessment and  
Recommendations(SBAR). Information from the following report(s) SBAR was reviewed with the receiving nurse. Opportunity for questions and clarification was provided. Assessment completed upon patients arrival to unit and care assumed.

## 2019-01-18 NOTE — PROCEDURES
BRIEF PROCEDURE NOTE    Date of Procedure: 1/18/2019   Preoperative Diagnosis: stable angina  Postoperative Diagnosis: stable angina    Procedure: Left heart cath, coronary angiography  Interventional Cardiologist: Buffy Smith DO  Anesthesia: local + IV moderate sedation   Estimated Blood Loss: Minimal    Access: right ulnar artery, 6F   (US guidance, right radial artery appeared to have small lumen and was calcified)    Catheters:  Left coronary: JL 3.5, selective to Lcx and unable to engage LAD. JL 4 sat better and opacified both arteries  Right coronary: JR 4    Findings:   L Main: large caliber vessel, no significant disease  LAD: large caliber vessel, wraps around apex, supplies moderate sized D1 and small D2, luminal irregularities    LCx: large caliber vessl, supplies two large caliber marginal branches, 50% stenosis within Lcx just distal to origin of first marginal, patent OM1 and OM2 stents    RCA: large caliber vessel, dominant vessel, proximal vessel  with bridging collaterals, distal vessel fills via right to right and left to right collaterals    LVEDP:  23-25 mmhg    FFR:  EBU 3.5 guide  Heparin  Comet wire. Zeroed outside of body and equailized in LAD, passed into Lcx distal to lestion. Baseline Pd/PA 0.99 without significant change with  IC adenosine      R  Specimens Removed : None    Closure Device: radial TR band    See full cath note. Complications: none    Findings:  1. Single vessel disease involving RCA   2. Moderate Lcx disease that is not significant by FFR  3.  Elevated lvedp    Plan:    uptitrate antianginal as tolerated  Consider  intervention of RCA    DO Akua Willett DO  [facility] [address]                                       Office [phone],DOLORES [phone]

## 2019-01-18 NOTE — INTERVAL H&P NOTE
H&P Update: 
Moe Chisholm was seen and examined. History and physical has been reviewed. The patient has been examined.  There have been no significant clinical changes since the completion of the originally dated History and Physical. 
 
Signed By: Di Montez DO   
 January 18, 2019 1:04 PM

## 2019-01-19 VITALS
HEART RATE: 71 BPM | HEIGHT: 70 IN | OXYGEN SATURATION: 97 % | DIASTOLIC BLOOD PRESSURE: 59 MMHG | BODY MASS INDEX: 41.95 KG/M2 | RESPIRATION RATE: 16 BRPM | TEMPERATURE: 98.7 F | WEIGHT: 293 LBS | SYSTOLIC BLOOD PRESSURE: 125 MMHG

## 2019-01-19 LAB
GLUCOSE BLD STRIP.AUTO-MCNC: 180 MG/DL (ref 65–100)
INR PPP: 1.4 (ref 0.9–1.1)
PROTHROMBIN TIME: 14 SEC (ref 9–11.1)
SERVICE CMNT-IMP: ABNORMAL

## 2019-01-19 PROCEDURE — 36415 COLL VENOUS BLD VENIPUNCTURE: CPT

## 2019-01-19 PROCEDURE — 85610 PROTHROMBIN TIME: CPT

## 2019-01-19 PROCEDURE — 74011250636 HC RX REV CODE- 250/636: Performed by: STUDENT IN AN ORGANIZED HEALTH CARE EDUCATION/TRAINING PROGRAM

## 2019-01-19 PROCEDURE — 77030012890

## 2019-01-19 PROCEDURE — 74011636637 HC RX REV CODE- 636/637: Performed by: NURSE PRACTITIONER

## 2019-01-19 PROCEDURE — 74011250637 HC RX REV CODE- 250/637: Performed by: NURSE PRACTITIONER

## 2019-01-19 PROCEDURE — 99218 HC RM OBSERVATION: CPT

## 2019-01-19 PROCEDURE — 82962 GLUCOSE BLOOD TEST: CPT

## 2019-01-19 RX ORDER — ONDANSETRON 2 MG/ML
4 INJECTION INTRAMUSCULAR; INTRAVENOUS
Status: DISCONTINUED | OUTPATIENT
Start: 2019-01-19 | End: 2019-01-19 | Stop reason: HOSPADM

## 2019-01-19 RX ORDER — ONDANSETRON 2 MG/ML
8 INJECTION INTRAMUSCULAR; INTRAVENOUS
Status: DISCONTINUED | OUTPATIENT
Start: 2019-01-19 | End: 2019-01-19 | Stop reason: HOSPADM

## 2019-01-19 RX ORDER — RANOLAZINE 500 MG/1
500 TABLET, EXTENDED RELEASE ORAL 2 TIMES DAILY
Status: DISCONTINUED | OUTPATIENT
Start: 2019-01-19 | End: 2019-01-19

## 2019-01-19 RX ORDER — KETOROLAC TROMETHAMINE 30 MG/ML
30 INJECTION, SOLUTION INTRAMUSCULAR; INTRAVENOUS ONCE
Status: DISCONTINUED | OUTPATIENT
Start: 2019-01-19 | End: 2019-01-19 | Stop reason: HOSPADM

## 2019-01-19 RX ADMIN — Medication 10 ML: at 06:53

## 2019-01-19 RX ADMIN — ISOSORBIDE MONONITRATE 120 MG: 60 TABLET, EXTENDED RELEASE ORAL at 08:05

## 2019-01-19 RX ADMIN — AMLODIPINE BESYLATE 5 MG: 5 TABLET ORAL at 08:05

## 2019-01-19 RX ADMIN — OXYCODONE HYDROCHLORIDE AND ACETAMINOPHEN 1 TABLET: 7.5; 325 TABLET ORAL at 03:05

## 2019-01-19 RX ADMIN — ONDANSETRON 4 MG: 2 INJECTION INTRAMUSCULAR; INTRAVENOUS at 03:05

## 2019-01-19 RX ADMIN — ALLOPURINOL 100 MG: 100 TABLET ORAL at 08:05

## 2019-01-19 RX ADMIN — PANTOPRAZOLE SODIUM 40 MG: 40 TABLET, DELAYED RELEASE ORAL at 06:53

## 2019-01-19 RX ADMIN — CARVEDILOL 37.5 MG: 12.5 TABLET, FILM COATED ORAL at 08:05

## 2019-01-19 RX ADMIN — INSULIN LISPRO 2 UNITS: 100 INJECTION, SOLUTION INTRAVENOUS; SUBCUTANEOUS at 08:04

## 2019-01-19 RX ADMIN — OXYCODONE HYDROCHLORIDE AND ACETAMINOPHEN 1 TABLET: 7.5; 325 TABLET ORAL at 10:05

## 2019-01-19 NOTE — PROGRESS NOTES
[facility] Dosing Services: 1/18/2019 Consult for Warfarin Dosing by Pharmacy by CABRERA Azevedo Consult provided for this 66 y.o.  female , for indication of Atrial Fibrillation. Day of Therapy - Continuation from home. Home dose : 3 mg daily per patient. It has worked well per patient. Dose to achieve an INR goal of 2-3 Order entered for Warfarin 3 mg ordered to be given now Significant drug interactions:  
Previous dose given 3 mg at home about 10 days ago per patient as she was holding it for the procedure PT/INR Lab Results Component Value Date/Time INR 3.5 (H) 11/13/2018 03:33 PM  
 INR (POC) 1.7 (H) 01/18/2019 12:17 PM  
  
Platelets Lab Results Component Value Date/Time PLATELET 200 92/69/1140 12:58 PM  
  
H/H Lab Results Component Value Date/Time HGB 9.1 (L) 01/18/2019 12:58 PM  
  
 
Pharmacy to follow daily and will provide subsequent Warfarin dosing based on clinical status. [address] [facility])  Contact information [phone]

## 2019-01-19 NOTE — PROGRESS NOTES
Problem: Falls - Risk of 
Goal: *Absence of Falls Document Delores Fogo Fall Risk and appropriate interventions in the flowsheet. Outcome: Progressing Towards Goal 
Fall Risk Interventions: 
  
 
  
 
Medication Interventions: Assess postural VS orthostatic hypotension, Evaluate medications/consider consulting pharmacy, Patient to call before getting OOB, Teach patient to arise slowly History of Falls Interventions: Bed/chair exit alarm, Door open when patient unattended Problem: Falls - Risk of 
Goal: *Absence of Falls Document Delores Fogo Fall Risk and appropriate interventions in the flowsheet. Outcome: Progressing Towards Goal 
Fall Risk Interventions: 
  
 
  
 
Medication Interventions: Assess postural VS orthostatic hypotension, Evaluate medications/consider consulting pharmacy, Patient to call before getting OOB, Teach patient to arise slowly History of Falls Interventions: Bed/chair exit alarm, Door open when patient unattended

## 2019-01-19 NOTE — ROUTINE PROCESS
Bedside and Verbal shift change report given to Ghada Marmolejo RN (oncoming nurse) by Tess Diaz RN (offgoing nurse). Report included the following information SBAR, Kardex, Procedure Summary, MAR and Recent Results.

## 2019-01-19 NOTE — PROGRESS NOTES
1/18/19 2000 Pt requested for another dinner meal nursing supervisor notified and frozen meal provided. 2300 Pt called out for pain medication brought percocet tablet pt stated she needs iv Zofran before taking the percocet due to nausea and they gave in the ER and its her home med too per pt. Informed Cardiologist Zofran 4mg-8 mg iv every 6 hrs per orders. Hemodialysis on progress and pt on her ipod playing [employer] using her right hand where she c/o pain.

## 2019-01-19 NOTE — PROGRESS NOTES
Jason Geronimo DO [facility] [address], [address] [place], [address] [place] [address] Office [phone],[facility] [phone] 
 
2019 Admit Date: 2019 Admit Diagnosis: CATH LAB; Unstable angina (HCC) NAME: Volodymyr Curtis :  1955     MRN: [id number] Assessment/Plan: 
 
 
Coronary artery disease with escalating angina ESRD Diabetes 
 
-Patient was admitted overnight for hemodialysis after cardiac catheterization performed yesterday afternoon. Cardiac catheterization showed stable coronary artery disease with hemodynamically insignificant left circumflex disease. Chronic total occlusion of proximal right coronary artery 
 
 
-Unable to uptitrate antianginal therapy due to blood pressure. Patient unable to use Ranexa due to end-stage renal disease. 
-Consider evaluation for  intervention of right coronary artery Please do not hesitate to contact us with questions or concerns. See note below for details. Meron Esparza,  Interval Hx: 
Requesting IV Dilaudid overnight due to pain. She was made aware that we declined that offer ROS: 
Constitutional: Negative Cardiovascular: Currently without any rest pain Respiratory: No shortness of breath at rest 
 
Medications: 
Current Facility-Administered Medications Medication Dose Route Frequency  ondansetron (ZOFRAN) injection 4 mg  4 mg IntraVENous Q6H PRN  
 ondansetron (ZOFRAN) injection 8 mg  8 mg IntraVENous Q6H PRN  
 ketorolac (TORADOL) injection 30 mg  30 mg IntraVENous ONCE  
 albuterol (PROVENTIL VENTOLIN) nebulizer solution 2.5 mg  2.5 mg Nebulization Q4H PRN  
 allopurinol (ZYLOPRIM) tablet 100 mg  100 mg Oral DAILY  amLODIPine (NORVASC) tablet 5 mg  5 mg Oral DAILY  atorvastatin (LIPITOR) tablet 80 mg  80 mg Oral QHS  carvedilol (COREG) tablet 37.5 mg  37.5 mg Oral BID WITH MEALS  
  [START ON 1/22/2019] ergocalciferol capsule 50,000 Units  50,000 Units Oral every Tuesday  isosorbide mononitrate ER (IMDUR) tablet 120 mg  120 mg Oral DAILY  nitroglycerin (NITROSTAT) tablet 0.4 mg  0.4 mg SubLINGual Q5MIN PRN  
 oxyCODONE-acetaminophen (PERCOCET 7.5) 7.5-325 mg per tablet 1 Tab  1 Tab Oral Q8H PRN  pantoprazole (PROTONIX) tablet 40 mg  40 mg Oral ACB  insulin lispro (HUMALOG) injection   SubCUTAneous AC&HS  
 glucose chewable tablet 16 g  4 Tab Oral PRN  
 dextrose (D50W) injection syrg 12.5-25 g  12.5-25 g IntraVENous PRN  
 glucagon (GLUCAGEN) injection 1 mg  1 mg IntraMUSCular PRN  
 epoetin charlie (EPOGEN;PROCRIT) injection 10,000 Units  10,000 Units IntraVENous Q MON, WED & FRI  Warfarin pharmacy to dose   Other Rx Dosing/Monitoring  sodium chloride (NS) flush 5-40 mL  5-40 mL IntraVENous Q8H  
 sodium chloride (NS) flush 5-40 mL  5-40 mL IntraVENous PRN Current Outpatient Medications Medication Sig  
 insulin aspart protamine/insulin aspart (NOVOLOG MIX 70-30 U-100 INSULN) 100 unit/mL (70-30) injection 10-40 Units by SubCUTAneous route nightly as needed (BG > 160).  amLODIPine (NORVASC) 5 mg tablet Take 5 mg by mouth daily.  oxyCODONE-acetaminophen (PERCOCET) 7.5-325 mg per tablet Take 1 Tab by mouth two (2) times daily as needed for Pain. Max Daily Amount: 2 Tabs. May fill today 10/4/18  isosorbide mononitrate ER (IMDUR) 120 mg CR tablet TAKE 1 TABLET BY MOUTH ONCE DAILY  allopurinol (ZYLOPRIM) 100 mg tablet TAKE 1 TABLET BY MOUTH EVERY DAY  nitroglycerin (NITROSTAT) 0.3 mg SL tablet 1 Tab by SubLINGual route every five (5) minutes as needed for Chest Pain.  carvedilol (COREG) 25 mg tablet Take 1.5 Tabs by mouth two (2) times daily (with meals).   
 pantoprazole (PROTONIX) 40 mg tablet TAKE 1 TABLET BY MOUTH EVERY DAY FOR HEARTBURN  
 albuterol (PROAIR HFA) 90 mcg/actuation inhaler Take 2 Puffs by inhalation every four (4) hours as needed for Wheezing.  aspirin 81 mg tablet Take 81 mg by mouth daily.  atorvastatin (LIPITOR) 80 mg tablet Take 80 mg by mouth nightly.  warfarin (COUMADIN) 2 mg tablet Take 1 Tab by mouth daily. Skip 11/8-11/9 restart 11/10/18  ergocalciferol (VITAMIN D2) 50,000 unit capsule Take 50,000 Units by mouth every Tuesday. Physical Exam: 
Visit Vitals /59 (BP 1 Location: Right arm, BP Patient Position: At rest) Pulse 71 Temp 98.7 °F (37.1 °C) Resp 16 Ht 5' 10\" (1.778 m) Wt 306 lb 14.1 oz (139.2 kg) SpO2 97% Breastfeeding? No  
BMI 44.03 kg/m² O2 Flow Rate (L/min): 2 l/min O2 Device: Nasal cannula Gen: Well-developed, well-nourished, no acute distress Resp: No accessory muscle use, Clear breath sounds, No rales or rhonchi 
Card: Normal Rate,Regular Rythm, Normal S1, S2, No murmurs, rubs or gallop. No edema. PT/DP pulses 2+. Right ulnar axis without significant swelling or ecchymosis. Mildly tender to touch. Psych:  Good insight, oriented to person, place Labs:   
No results for input(s): CPK, TROIQ in the last 72 hours. No lab exists for component: CKQMB, CPKMB Recent Labs  
  01/18/19 
1258   
K 4.4  
 BUN 55* CREA 9.61* * CA 8.6 Recent Labs  
  01/18/19 
1258 WBC 9.5 HGB 9.1*  
HCT 30.4*  No results for input(s): CHOL, LDLC in the last 72 hours.  
 
No lab exists for component: TGL, HDLC,  HBA1C

## 2019-01-19 NOTE — PROGRESS NOTES
Pharmacy Dosing Services:  
 
Consult to convert patient's Novolog 70/30 to [place] Inquired patient. Patient uses Novolog 70/30. She checks blood sugar at bedtime. If BG > 160, patient takes 10 to 40 units of insulin depending on how high BG is. Patient states she does not have to takes it always. Currently patient has normal sensitivity sliding scale ordered. I recommend to continue with it for now and re-evaluate the need to substitute Novolog 70/30 based on BG trend in the hospital. BG so far has been okay (, 112, 114) Thank you, Roxanne Veras, PharmD

## 2019-01-19 NOTE — PROGRESS NOTES
Bedside and Verbal shift change report given to 23 Ying Schmitz (oncoming nurse) by Estiven Pereira (offgoing nurse). Report included the following information SBAR and Kardex.

## 2019-01-19 NOTE — PROGRESS NOTES
I have reviewed discharge instructions with the patient. The patient verbalized understanding. Signed copy of discharge instructions placed in patient's chart.

## 2019-01-19 NOTE — PROGRESS NOTES
CARDIOLOGY Patient asking for more pain medication. Was denied Dilaudid that she wanted. Is allergic to morphine. Also asking when she is going home. I will give her 30 mg Toradol x 1.   
 
Nico Benton MD

## 2019-01-19 NOTE — DIALYSIS
[facility]  [phone] Vitals   Pre   Post   Assessment   Pre   Post    
Temp  Temp: 97.6 °F (36.4 °C) (01/18/19 2011)  98.1 LOC  Alert/oriented X 3 Alert/oriented X 3 HR   Pulse (Heart Rate): 68 (01/18/19 2011) 75 Lungs   Clear throughout  Clear throughout B/P   BP: 123/76 (01/18/19 2011) 128/68 Cardiac   RRR  RRR Resp   Resp Rate: 20 (01/18/19 2011) 18 Skin   Warm/dry/intact  Warm/dry/ 
intact Pain level  Pain Intensity 1: 5 (01/18/19 1755) 0 Edema  R forearm post cardiac cath 
 
 RFA post cardiac cath Orders: Duration:   Start:    2011 End:    2341 Total:   3.5 hours Dialyzer:   Dialyzer/Set Up Inspection: George Machuca (01/18/19 2011) K Bath:   Dialysate K (mEq/L): 2 (01/18/19 2011) Ca Bath:   Dialysate CA (mEq/L): 2.5 (01/18/19 2011) Na/Bicarb:   Dialysate NA (mEq/L): 140 (01/18/19 2011) Target Fluid Removal:   Goal/Amount of Fluid to Remove (mL): 2500 mL (01/18/19 2011) Access Type & Location:   [facility] AVF Labs Obtained/Reviewed Critical Results Called   Date when labs were drawn- 
Hgb-   
HGB Date Value Ref Range Status 01/18/2019 9.1 (L) 11.5 - 16.0 g/dL Final  
 
K-   
Potassium Date Value Ref Range Status 01/18/2019 4.4 3.5 - 5.1 mmol/L Final  
 
Ca-  
Calcium Date Value Ref Range Status 01/18/2019 8.6 8.5 - 10.1 MG/DL Final  
 
Bun-  
BUN Date Value Ref Range Status 01/18/2019 55 (H) 6 - 20 MG/DL Final  
 
Creat-  
Creatinine Date Value Ref Range Status 01/18/2019 9.61 (H) 0.55 - 1.02 MG/DL Final  
 
  
Medications/ Blood Products Given Name   Dose   Route and Time Blood Volume Processed (BVP):    87 Net Fluid Removed:  2500 mls Comments Time Out Done: 2005 Primary Nurse Rpt Pre:Sandi Jennifer Alderman RN 
Primary Nurse Rpt Post:Lelia Rosario RN 
[address] Care Plan:Pt will tolerate HD with stable VS and assessment Tx Summary: LFA AVF with + thrill, + bruit, cannulated easily with (2) 15G HD needles. VS and assessment stable throughout. All possible blood rinsed back to the patient at the conclusion of the treatment. Venous/arterial needles removed. Handheld pressure applied X 5 minutes for hemostasis. Admiting Diagnosis:Elective Cardiac Cath 
Pt's previous [place]ier [id number] Consent signed - Informed Consent Verified: Yes (01/18/19 2011) Mauro Consent - Obtained Hepatitis Status- Unknown Machine #- Machine Number: [id number] (01/18/19 2011) Telemetry status-Not monitored Pre-dialysis wt. -

## 2019-01-19 NOTE — PROGRESS NOTES
Patient complaining of severe pain in right wrist (cardiac cath site). RN contacted Dr. Marquis Lynn, who authorized patient's q8h PRN percocet to be administered  1 hour early. RN will administer medication, and continue to monitor patient.

## 2019-01-19 NOTE — PROGRESS NOTES
1/19/19 0011 Pt called out telling me that her pain medication is not working she want Dilaudid shot. Pt stated I need to call her doctor to get an order for Dilaudid but pt not resting her right hand to minimize pain still play solitaire on her ipod. 0011 spoke to (Card)no Dilaudid. Informed pt and she is not happy and inform her that she need to  rest and elevate her right hand to help ease the soreness/pain but become angry. 0114 Pt called out stating she did not get her insulin informed pt her blood sugar is 188 coverage not needed for HS. Informed pt that @HS blood sugar 200 and above needs insulin coverage,Breakfast,lunch and dinner blood sugar 140 and above needs sliding scale Humalog coverage. Pt stated\" that's not the way it works,when I was  her before 140 blood sugar they gave me insulin\". Explaing the sliding scale that she had pt is not open for education resistance and insist what she want/knows. Pt stated she is taking steroid and needs insulin.

## 2019-01-22 ENCOUNTER — PATIENT OUTREACH (OUTPATIENT)
Dept: FAMILY MEDICINE CLINIC | Age: 64
End: 2019-01-22

## 2019-01-22 NOTE — PROGRESS NOTES
Hospital Discharge Follow-Up Date/Time:  2019 2:33 PM 
 
Patient was admitted to [facility] on 19 and discharged on 19 for unstable angina. The physician discharge summary was not available at the time of outreach. Patient was contacted within 2 business days of discharge. Top Challenges reviewed with the provider No cardiology follow-up scheduled yet - patient waiting on a call back Method of communication with provider :chart routing Inpatient RRAT score: 29 Was this a readmission? no  
Patient stated reason for the readmission: n/a Nurse Navigator (NN) contacted the patient by telephone to perform post hospital discharge assessment. Verified name and  with patient as identifiers. Provided introduction to self, and explanation of the Nurse Navigator role. Reviewed discharge instructions and red flags with patient who verbalized understanding. Patient given an opportunity to ask questions and does not have any further questions or concerns at this time. The patient agrees to contact the PCP office for questions related to their healthcare. NN provided contact information for future reference. Disease Specific:   N/A Summary of patient's top problems: 1. Coronary artery disease with escalating angina 2. End Stage Renal Disease 3. Diabetes 
-Patient was admitted overnight for hemodialysis after cardiac catheterization on 19. Cardiac catheterization showed stable coronary artery disease with hemodynamically insignificant left circumflex disease. Chronic total occlusion of proximal right coronary artery 
-Unable to uptitrate antianginal therapy due to blood pressure and unable to use Ranexa due to end-stage renal disease. 
-Possible evaluation for  intervention of right coronary artery - patient waiting on call back from cardiology Home Health orders at discharge: none [address]: n/a Date of initial visit: n/a 
 
 Durable Medical Equipment ordered/company: none Durable Medical Equipment received: n/a Barriers to care? none identified Advance Care Planning:  
Does patient have an Advance Directive:  not on file Medication(s):  
New Medications at Discharge: none Changed Medications at Discharge: novolog, proair, coumadin Discontinued Medications at Discharge: allergy med, lidocaine Medication reconciliation was performed with patient, who verbalizes understanding of administration of home medications. There were no barriers to obtaining medications identified at this time. Referral to Pharm D needed: no  
 
Current Outpatient Medications Medication Sig  
 insulin aspart protamine/insulin aspart (NOVOLOG MIX 70-30 U-100 INSULN) 100 unit/mL (70-30) injection 10-40 Units by SubCUTAneous route nightly as needed (BG > 160).  amLODIPine (NORVASC) 5 mg tablet Take 5 mg by mouth daily.  oxyCODONE-acetaminophen (PERCOCET) 7.5-325 mg per tablet Take 1 Tab by mouth two (2) times daily as needed for Pain. Max Daily Amount: 2 Tabs. May fill today 10/4/18  isosorbide mononitrate ER (IMDUR) 120 mg CR tablet TAKE 1 TABLET BY MOUTH ONCE DAILY  allopurinol (ZYLOPRIM) 100 mg tablet TAKE 1 TABLET BY MOUTH EVERY DAY  warfarin (COUMADIN) 2 mg tablet Take 1 Tab by mouth daily. Skip 11/8-11/9 restart 11/10/18  nitroglycerin (NITROSTAT) 0.3 mg SL tablet 1 Tab by SubLINGual route every five (5) minutes as needed for Chest Pain.  carvedilol (COREG) 25 mg tablet Take 1.5 Tabs by mouth two (2) times daily (with meals).  pantoprazole (PROTONIX) 40 mg tablet TAKE 1 TABLET BY MOUTH EVERY DAY FOR HEARTBURN  
 albuterol (PROAIR HFA) 90 mcg/actuation inhaler Take 2 Puffs by inhalation every four (4) hours as needed for Wheezing.  ergocalciferol (VITAMIN D2) 50,000 unit capsule Take 50,000 Units by mouth every Tuesday.  aspirin 81 mg tablet Take 81 mg by mouth daily.  atorvastatin (LIPITOR) 80 mg tablet Take 80 mg by mouth nightly. No current facility-administered medications for this visit. There are no discontinued medications. [facility] follow up appointment(s):  
Future Appointments Date Time Provider Meagan Mcintyre 1/29/2019  2:00 PM Isai Levine, DO [facility] [address] Non-[facility] follow up appointment(s): none [facility]:  n/a  
 
 
Goals  Prevent complications post hospitalization. 1/22/19 - Patient reports that she is waiting on a call back from cardiology to schedule follow-up. Reports that she has no concerns other than she has a lot of bruising on her arm from the cath access site. Also reports some swelling. No bleeding. Advised patient to monitor for worsening swelling, numbness/tingling in hand, and color changes. Recommended applying ice pack. Patient stated understanding and will call cardiology if any symptoms worsen. Patient has PCP appointment on 1/29/19 for INR check. Patient will schedule with cardiology and attend all appointments.  ELGIN

## 2019-01-29 ENCOUNTER — CLINICAL SUPPORT (OUTPATIENT)
Dept: CARDIOLOGY CLINIC | Age: 64
End: 2019-01-29

## 2019-01-29 ENCOUNTER — OFFICE VISIT (OUTPATIENT)
Dept: FAMILY MEDICINE CLINIC | Age: 64
End: 2019-01-29

## 2019-01-29 VITALS
DIASTOLIC BLOOD PRESSURE: 64 MMHG | RESPIRATION RATE: 16 BRPM | WEIGHT: 293 LBS | BODY MASS INDEX: 41.95 KG/M2 | HEART RATE: 68 BPM | SYSTOLIC BLOOD PRESSURE: 102 MMHG | HEIGHT: 70 IN | TEMPERATURE: 98.2 F | OXYGEN SATURATION: 97 %

## 2019-01-29 DIAGNOSIS — I50.30 (HFPEF) HEART FAILURE WITH PRESERVED EJECTION FRACTION (HCC): ICD-10-CM

## 2019-01-29 DIAGNOSIS — Z95.9 S/P LEFT PULMONARY ARTERY PRESSURE SENSOR IMPLANT PLACEMENT: ICD-10-CM

## 2019-01-29 DIAGNOSIS — E11.22 TYPE 2 DIABETES MELLITUS WITH CHRONIC KIDNEY DISEASE ON CHRONIC DIALYSIS, WITH LONG-TERM CURRENT USE OF INSULIN (HCC): ICD-10-CM

## 2019-01-29 DIAGNOSIS — Z99.89 OSA ON CPAP: ICD-10-CM

## 2019-01-29 DIAGNOSIS — R00.2 PALPITATIONS: ICD-10-CM

## 2019-01-29 DIAGNOSIS — Z99.2 TYPE 2 DIABETES MELLITUS WITH CHRONIC KIDNEY DISEASE ON CHRONIC DIALYSIS, WITH LONG-TERM CURRENT USE OF INSULIN (HCC): ICD-10-CM

## 2019-01-29 DIAGNOSIS — I49.3 FREQUENT PVCS: ICD-10-CM

## 2019-01-29 DIAGNOSIS — I25.118 ATHEROSCLEROSIS OF NATIVE CORONARY ARTERY OF NATIVE HEART WITH STABLE ANGINA PECTORIS (HCC): ICD-10-CM

## 2019-01-29 DIAGNOSIS — G47.33 OSA ON CPAP: ICD-10-CM

## 2019-01-29 DIAGNOSIS — Z79.4 TYPE 2 DIABETES MELLITUS WITH CHRONIC KIDNEY DISEASE ON CHRONIC DIALYSIS, WITH LONG-TERM CURRENT USE OF INSULIN (HCC): ICD-10-CM

## 2019-01-29 DIAGNOSIS — N18.6 TYPE 2 DIABETES MELLITUS WITH CHRONIC KIDNEY DISEASE ON CHRONIC DIALYSIS, WITH LONG-TERM CURRENT USE OF INSULIN (HCC): ICD-10-CM

## 2019-01-29 DIAGNOSIS — I10 ESSENTIAL HYPERTENSION: ICD-10-CM

## 2019-01-29 DIAGNOSIS — Z79.01 WARFARIN ANTICOAGULATION: Primary | ICD-10-CM

## 2019-01-29 DIAGNOSIS — I27.20 PULMONARY HTN (HCC): ICD-10-CM

## 2019-01-29 LAB
INR BLD: 2.2
PT POC: NORMAL SECONDS
VALID INTERNAL CONTROL?: YES

## 2019-01-29 NOTE — PATIENT INSTRUCTIONS
Lab Results Component Value Date/Time  INR 1.4 (H) 01/19/2019 03:10 AM  
 INR POC 2.2 01/29/2019 02:22 PM  
 Prothrombin time 14.0 (H) 01/19/2019 03:10 AM

## 2019-01-29 NOTE — PROGRESS NOTES
Chief Complaint Patient presents with  Follow-up INR  Medication Refill  
  percocet Patient presents in office today for f/u to INR. Also is in need of a refill on her percocet. No concerns. 1. Have you been to the ER, urgent care clinic since your last visit? Hospitalized since your last visit? Yes 1/16/19 cardiac cath done at [facility] 2. Have you seen or consulted any other health care providers outside of the [address] [place] [address] since your last visit? Include any pap smears or colon screening. No 
 
Learning Assessment 3/20/2018 PRIMARY LEARNER Patient PRIMARY LANGUAGE ENGLISH  
LEARNER PREFERENCE PRIMARY LISTENING  
ANSWERED BY patient RELATIONSHIP SELF

## 2019-01-29 NOTE — PROGRESS NOTES
Jose M Reynoso is a 66 y.o. female Chief Complaint Patient presents with  Follow-up INR  Medication Refill  
  percocet Pt here for recheck of her INR and is currently at 2.2 and recently had a catheterization and was advised she will need to have a cabg. Pt has a 24 hr monitor opn currently to ensure no dysrhythmia. Pt otherwise doing relatively well. Pt does not need a refill of her pain medication at this time and will contact me when she is due for a refill. 
 
she is a 66y.o. year old female who presents for evalution. Reviewed PmHx, RxHx, FmHx, SocHx, AllgHx and updated and dated in the chart. Review of Systems - negative except as listed above in the HPI Objective:  
 
Vitals:  
 01/29/19 1406 BP: 102/64 Pulse: 68 Resp: 16 Temp: 98.2 °F (36.8 °C) TempSrc: Oral  
SpO2: 97% Weight: 309 lb (140.2 kg) Height: 5' 10\" (1.778 m) Current Outpatient Medications Medication Sig  
 insulin aspart protamine/insulin aspart (NOVOLOG MIX 70-30 U-100 INSULN) 100 unit/mL (70-30) injection 10-40 Units by SubCUTAneous route nightly as needed (BG > 160).  amLODIPine (NORVASC) 5 mg tablet Take 5 mg by mouth daily.  oxyCODONE-acetaminophen (PERCOCET) 7.5-325 mg per tablet Take 1 Tab by mouth two (2) times daily as needed for Pain. Max Daily Amount: 2 Tabs. May fill today 10/4/18  isosorbide mononitrate ER (IMDUR) 120 mg CR tablet TAKE 1 TABLET BY MOUTH ONCE DAILY  allopurinol (ZYLOPRIM) 100 mg tablet TAKE 1 TABLET BY MOUTH EVERY DAY  warfarin (COUMADIN) 2 mg tablet Take 1 Tab by mouth daily. Skip 11/8-11/9 restart 11/10/18  nitroglycerin (NITROSTAT) 0.3 mg SL tablet 1 Tab by SubLINGual route every five (5) minutes as needed for Chest Pain.  carvedilol (COREG) 25 mg tablet Take 1.5 Tabs by mouth two (2) times daily (with meals).   
 pantoprazole (PROTONIX) 40 mg tablet TAKE 1 TABLET BY MOUTH EVERY DAY FOR HEARTBURN  
  albuterol (PROAIR HFA) 90 mcg/actuation inhaler Take 2 Puffs by inhalation every four (4) hours as needed for Wheezing.  ergocalciferol (VITAMIN D2) 50,000 unit capsule Take 50,000 Units by mouth every Tuesday.  aspirin 81 mg tablet Take 81 mg by mouth daily.  atorvastatin (LIPITOR) 80 mg tablet Take 80 mg by mouth nightly. No current facility-administered medications for this visit. Physical Examination: General appearance - alert, well appearing, and in no distress Chest - clear to auscultation, no wheezes, rales or rhonchi, symmetric air entry Heart - normal rate, regular rhythm, normal S1, S2, no murmurs, rubs, clicks or gallops Assessment/ Plan:  
Diagnoses and all orders for this visit: 
 
1. Warfarin anticoagulation -     AMB POC PT/INR 
 
 @ goal c/w current therapy recheck 1 month Follow-up Disposition: 
Return in about 4 weeks (around 2/26/2019), or if symptoms worsen or fail to improve. I have discussed the diagnosis with the patient and the intended plan as seen in the above orders. The patient has received an after-visit summary and questions were answered concerning future plans. Pt conveyed understanding of plan. Medication Side Effects and Warnings were discussed with patient [id number] [facility] [address], DO

## 2019-01-29 NOTE — PROGRESS NOTES
Applied 24 hr holter per Dr Corinthia Goodell dx: palps. Pt has #[id number] & due back on 1/30/19. Chargeable visit.

## 2019-02-05 ENCOUNTER — PATIENT OUTREACH (OUTPATIENT)
Dept: FAMILY MEDICINE CLINIC | Age: 64
End: 2019-02-05

## 2019-02-07 ENCOUNTER — OFFICE VISIT (OUTPATIENT)
Dept: FAMILY MEDICINE CLINIC | Age: 64
End: 2019-02-07

## 2019-02-07 ENCOUNTER — HOSPITAL ENCOUNTER (OUTPATIENT)
Dept: LAB | Age: 64
Discharge: HOME | End: 2019-02-07
Payer: MEDICARE

## 2019-02-07 VITALS
RESPIRATION RATE: 16 BRPM | OXYGEN SATURATION: 95 % | SYSTOLIC BLOOD PRESSURE: 112 MMHG | HEIGHT: 70 IN | DIASTOLIC BLOOD PRESSURE: 70 MMHG | WEIGHT: 293 LBS | TEMPERATURE: 97.7 F | HEART RATE: 73 BPM | BODY MASS INDEX: 41.95 KG/M2

## 2019-02-07 DIAGNOSIS — M25.561 CHRONIC PAIN OF RIGHT KNEE: Primary | ICD-10-CM

## 2019-02-07 DIAGNOSIS — G89.29 CHRONIC PAIN OF RIGHT KNEE: Primary | ICD-10-CM

## 2019-02-07 DIAGNOSIS — Z51.81 MEDICATION MONITORING ENCOUNTER: ICD-10-CM

## 2019-02-07 DIAGNOSIS — G89.4 CHRONIC PAIN SYNDROME: ICD-10-CM

## 2019-02-07 PROCEDURE — 80307 DRUG TEST PRSMV CHEM ANLYZR: CPT

## 2019-02-07 PROCEDURE — 80365 DRUG SCREENING OXYCODONE: CPT

## 2019-02-07 RX ORDER — DICLOFENAC SODIUM 10 MG/G
2 GEL TOPICAL 4 TIMES DAILY
Qty: 100 G | Refills: 5 | Status: SHIPPED | OUTPATIENT
Start: 2019-02-07 | End: 2019-12-13

## 2019-02-07 RX ORDER — OXYCODONE AND ACETAMINOPHEN 7.5; 325 MG/1; MG/1
1 TABLET ORAL
Qty: 60 TAB | Refills: 0 | Status: SHIPPED | OUTPATIENT
Start: 2019-02-07 | End: 2019-03-12 | Stop reason: SDUPTHER

## 2019-02-07 NOTE — PATIENT INSTRUCTIONS
A Healthy Lifestyle: Care Instructions  Your Care Instructions    A healthy lifestyle can help you feel good, stay at a healthy weight, and have plenty of energy for both work and play. A healthy lifestyle is something you can share with your whole family. A healthy lifestyle also can lower your risk for serious health problems, such as high blood pressure, heart disease, and diabetes. You can follow a few steps listed below to improve your health and the health of your family. Follow-up care is a key part of your treatment and safety. Be sure to make and go to all appointments, and call your doctor if you are having problems. It's also a good idea to know your test results and keep a list of the medicines you take. How can you care for yourself at home? · Do not eat too much sugar, fat, or fast foods. You can still have dessert and treats now and then. The goal is moderation. · Start small to improve your eating habits. Pay attention to portion sizes, drink less juice and soda pop, and eat more fruits and vegetables. ? Eat a healthy amount of food. A 3-ounce serving of meat, for example, is about the size of a deck of cards. Fill the rest of your plate with vegetables and whole grains. ? Limit the amount of soda and sports drinks you have every day. Drink more water when you are thirsty. ? Eat at least 5 servings of fruits and vegetables every day. It may seem like a lot, but it is not hard to reach this goal. A serving or helping is 1 piece of fruit, 1 cup of vegetables, or 2 cups of leafy, raw vegetables. Have an apple or some carrot sticks as an afternoon snack instead of a candy bar. Try to have fruits and/or vegetables at every meal.  · Make exercise part of your daily routine. You may want to start with simple activities, such as walking, bicycling, or slow swimming. Try to be active 30 to 60 minutes every day. You do not need to do all 30 to 60 minutes all at once.  For example, you can exercise 3 times a day for 10 or 20 minutes. Moderate exercise is safe for most people, but it is always a good idea to talk to your doctor before starting an exercise program.  · Keep moving. Jose Cue the lawn, work in the garden, or Superhuman. Take the stairs instead of the elevator at work. · If you smoke, quit. People who smoke have an increased risk for heart attack, stroke, cancer, and other lung illnesses. Quitting is hard, but there are ways to boost your chance of quitting tobacco for good. ? Use nicotine gum, patches, or lozenges. ? Ask your doctor about stop-smoking programs and medicines. ? Keep trying. In addition to reducing your risk of diseases in the future, you will notice some benefits soon after you stop using tobacco. If you have shortness of breath or asthma symptoms, they will likely get better within a few weeks after you quit. · Limit how much alcohol you drink. Moderate amounts of alcohol (up to 2 drinks a day for men, 1 drink a day for women) are okay. But drinking too much can lead to liver problems, high blood pressure, and other health problems. Family health  If you have a family, there are many things you can do together to improve your health. · Eat meals together as a family as often as possible. · Eat healthy foods. This includes fruits, vegetables, lean meats and dairy, and whole grains. · Include your family in your fitness plan. Most people think of activities such as jogging or tennis as the way to fitness, but there are many ways you and your family can be more active. Anything that makes you breathe hard and gets your heart pumping is exercise. Here are some tips:  ? Walk to do errands or to take your child to school or the bus.  ? Go for a family bike ride after dinner instead of watching TV. Where can you learn more? Go to http://lindsay-kai.info/. Enter M116 in the search box to learn more about \"A Healthy Lifestyle: Care Instructions. \"  Current as of: September 11, 2018  Content Version: 11.9  © 4455-5186 [employer]. Care instructions adapted under license by [employer] (which disclaims liability or warranty for this information). If you have questions about a medical condition or this instruction, always ask your healthcare professional. [address] any warranty or liability for your use of this information.

## 2019-02-07 NOTE — PROGRESS NOTES
Ashlyn Feliciano is a 66 y.o. female   Chief Complaint   Patient presents with    Knee Pain    Medication Refill    pt states her R knee has been worsening over the past week and states she has noticed some discoloration of her skin. Pt saw ortho and was told her knee is fine. Pt had a TKR on this side and saw ortho and had x-rays and was told her knee looked fine and pt states it is not fine. Pt is also requesting a refill of her pain medication and this has been working well for her chronic chest pain and is meeting with surgeon on 2/13 regarding possible bypass surgery. Bring pain from a 7-9/10 to a 3-5/10. No hx of abuse. [facility] is not allowing me to pull up Rx hisotry at this time I have attempted 3x. Opioid/Pain Management:    1. Has the patient signed a pain contract for chronic narcotic use? yes  2. Has the patient had a UDS or Serum screen as per guidelines as per [facility]?:   yes    3. Does patient meet necessary guidelines for Naloxone treatement per the [facility]?:    no  4. Has the Prescription Monitoring Program been reviewed? It is not allowing me to pull up a report currently  5. Does patient have a long term condition that requires long term use of a Narcotic?  yes  6. Has patient been tolerant of therapy and responsible to routine follow up and specialist follow-up? Yes      she is a 66y.o. year old female who presents for evalution. Reviewed PmHx, RxHx, FmHx, SocHx, AllgHx and updated and dated in the chart. Review of Systems - negative except as listed above in the HPI    Objective:     Vitals:    02/07/19 1141   BP: 112/70   Pulse: 73   Resp: 16   Temp: 97.7 °F (36.5 °C)   TempSrc: Oral   SpO2: 95%   Weight: 304 lb (137.9 kg)   Height: 5' 10\" (1.778 m)       Current Outpatient Medications   Medication Sig    diclofenac (VOLTAREN) 1 % gel Apply 2 g to affected area four (4) times daily.  As needed for right knee pain    oxyCODONE-acetaminophen (PERCOCET) 7.5-325 mg per tablet Take 1 Tab by mouth two (2) times daily as needed for Pain. Max Daily Amount: 2 Tabs.  insulin aspart protamine/insulin aspart (NOVOLOG MIX 70-30 U-100 INSULN) 100 unit/mL (70-30) injection 10-40 Units by SubCUTAneous route nightly as needed (BG > 160).  amLODIPine (NORVASC) 5 mg tablet Take 5 mg by mouth daily.  isosorbide mononitrate ER (IMDUR) 120 mg CR tablet TAKE 1 TABLET BY MOUTH ONCE DAILY    allopurinol (ZYLOPRIM) 100 mg tablet TAKE 1 TABLET BY MOUTH EVERY DAY    warfarin (COUMADIN) 2 mg tablet Take 1 Tab by mouth daily. Skip 11/8-11/9 restart 11/10/18    nitroglycerin (NITROSTAT) 0.3 mg SL tablet 1 Tab by SubLINGual route every five (5) minutes as needed for Chest Pain.  carvedilol (COREG) 25 mg tablet Take 1.5 Tabs by mouth two (2) times daily (with meals).  pantoprazole (PROTONIX) 40 mg tablet TAKE 1 TABLET BY MOUTH EVERY DAY FOR HEARTBURN    albuterol (PROAIR HFA) 90 mcg/actuation inhaler Take 2 Puffs by inhalation every four (4) hours as needed for Wheezing.  ergocalciferol (VITAMIN D2) 50,000 unit capsule Take 50,000 Units by mouth every Tuesday.  aspirin 81 mg tablet Take 81 mg by mouth daily.  atorvastatin (LIPITOR) 80 mg tablet Take 80 mg by mouth nightly. No current facility-administered medications for this visit. Physical Examination: General appearance - alert, well appearing, and in no distress  Mental status - alert, oriented to person, place, and time  Chest - clear to auscultation, no wheezes, rales or rhonchi, symmetric air entry  Heart - normal rate, regular rhythm, normal S1, S2, no murmurs, rubs, clicks or gallops  Musculoskeletal - TKR scar R knee, edema of knee without eyrthema there was what may have been some mild bruising around lateral aspect, ttp over medial knee      Assessment/ Plan:   Diagnoses and all orders for this visit:    1.  Chronic pain of right knee  -     diclofenac (VOLTAREN) 1 % gel; Apply 2 g to affected area four (4) times daily. As needed for right knee pain  -     REFERRAL TO ORTHOPEDICS  Pt would like referral for 2nd opinion on her knee  2. Chronic pain syndrome  -     oxyCODONE-acetaminophen (PERCOCET) 7.5-325 mg per tablet; Take 1 Tab by mouth two (2) times daily as needed for Pain. Max Daily Amount: 2 Tabs. No change in medication indicated at this time  3. Medication monitoring encounter  -     DRUG SCREEN 11 W/CONF, SERUM  Last percocet was yesterday per pt     Follow-up Disposition:  Return if symptoms worsen or fail to improve. I have discussed the diagnosis with the patient and the intended plan as seen in the above orders. The patient has received an after-visit summary and questions were answered concerning future plans. Pt conveyed understanding of plan.     Medication Side Effects and Warnings were discussed with patient      [address] [facility], DO

## 2019-02-13 ENCOUNTER — OFFICE VISIT (OUTPATIENT)
Dept: CARDIOLOGY CLINIC | Age: 64
End: 2019-02-13

## 2019-02-13 VITALS
SYSTOLIC BLOOD PRESSURE: 118 MMHG | WEIGHT: 293 LBS | RESPIRATION RATE: 18 BRPM | DIASTOLIC BLOOD PRESSURE: 58 MMHG | HEIGHT: 70 IN | OXYGEN SATURATION: 87 % | BODY MASS INDEX: 41.95 KG/M2 | HEART RATE: 83 BPM

## 2019-02-13 DIAGNOSIS — I10 ESSENTIAL HYPERTENSION: Chronic | ICD-10-CM

## 2019-02-13 DIAGNOSIS — I50.30 (HFPEF) HEART FAILURE WITH PRESERVED EJECTION FRACTION (HCC): ICD-10-CM

## 2019-02-13 DIAGNOSIS — I25.10 ATHEROSCLEROSIS OF NATIVE CORONARY ARTERY OF NATIVE HEART, ANGINA PRESENCE UNSPECIFIED: Primary | Chronic | ICD-10-CM

## 2019-02-13 RX ORDER — PREDNISONE 50 MG/1
50 TABLET ORAL
Qty: 2 TAB | Refills: 0 | Status: SHIPPED | OUTPATIENT
Start: 2019-02-13 | End: 2019-02-15

## 2019-02-13 RX ORDER — CLOPIDOGREL 300 MG/1
600 TABLET, FILM COATED ORAL ONCE
Qty: 2 TAB | Refills: 0 | Status: SHIPPED | OUTPATIENT
Start: 2019-02-13 | End: 2019-02-13

## 2019-02-13 RX ORDER — WARFARIN 3 MG/1
TABLET ORAL
Refills: 3 | COMMUNITY
Start: 2019-02-09 | End: 2019-02-22

## 2019-02-13 NOTE — PROGRESS NOTES
Chief Complaint   Patient presents with    New Patient     referred by Albin Banuelos CTO intervkali of right artery     1. Have you been to the ER, urgent care clinic since your last visit? Hospitalized since your last visit? No    2. Have you seen or consulted any other health care providers outside of the [address] [place] [address] since your last visit? Include any pap smears or colon screening.  No

## 2019-02-13 NOTE — PROGRESS NOTES
Subjective/HPI:     Ms. Reina Lopez is a 66 y.o. female is here for new patient consultation for RCA . She has a PMHx of CAD, HFpEF, DM2, G6PD deficiency trait, GERD, HTN, JASEN, ILD with cardioMEMs implant, ESRD on dialysis MWF, and hx of DVT on coumadin therapy. She was referred by Dr. John Elias for consideration of  of the RCA noted on cardiac cath in 1/9/19. She has been following with Dr. Watson Perkins since 2002. She notes worsening shortness of breath with exertion with associated sharp chest pain symptoms. She complains of fatigue that has worsened over months. She notes that when she exerts herself even minimally, her heart rate will shoot up and her pulse ox will drop to the 80s. She had a cardiac cath done in 1/29/19 with Dr. Leatha Garcia who noted  of the RCA, with moderate disease of the LCx not significant by FFR. She would like to ensure her heart is in good condition as she is planning for evaluation for kidney transplant.          PCP Provider  Jerry Simmons DO  Past Medical History:   Diagnosis Date     Sleep Apnea 2/16/2011    Aortic aneurysm ([address]     CAD (coronary Artery Disease) Native Artery 2/16/2011    CKD (chronic kidney disease) stage 4, GFR 15-29 ml/min ([address] 2/10/2017    COPD (chronic obstructive pulmonary disease) (HCC)     Diabetic gastroparesis (HCC)     Diastolic heart failure ([address] 10/5/2012    DM type 2 causing neurological disease ([address]     DM type 2 causing renal disease ([address]     DM type 2 causing vascular disease ([address]     Esophageal stricture 2012    dialted Dr. Ginette Ross G6PD deficiency ([address]     trait    GERD (gastroesophageal reflux disease)     Gout     ILD (interstitial lung disease) ([address]     open lung bx CJW 2010    Morbid obesity ([address]     OA (osteoarthritis)     Ovarian cancer ([address]     cervical and uterine    Rheumatoid arteritis     S/P left pulmonary artery pressure sensor implant placement 8/31/2017    [employer]     Tobacco use disorder 3/21/2012    [facility]     Vitamin D deficiency 2013      Past Surgical History:   Procedure Laterality Date    CARDIAC SURG PROCEDURE UNLIST      stents    COLONOSCOPY N/A 2016    COLONOSCOPY performed by Serge Almendarez MD at [facility] HX APPENDECTOMY      HX CARPAL TUNNEL RELEASE      bilateral    HX CHOLECYSTECTOMY      HX HERNIA REPAIR      HX HYSTERECTOMY      HX ORTHOPAEDIC  12    right knee replacement    HX TONSIL AND ADENOIDECTOMY      UPPER GI ENDOSCOPY,VINOD W GUIDE  2016          Family History   Problem Relation Age of Onset    Heart Disease Mother     Heart Disease Brother      Social History     Socioeconomic History    Marital status:      Spouse name: Not on file    Number of children: Not on file    Years of education: Not on file    Highest education level: Not on file   Social Needs    Financial resource strain: Not on file    Food insecurity - worry: Not on file    Food insecurity - inability: Not on file   Breadtrip needs - medical: Not on file   Breadtrip needs - non-medical: Not on file   Occupational History    Not on file   Tobacco Use    Smoking status: Former Smoker     Packs/day: 0.50     Years: 41.00     Pack years: 20.50     Types: Cigarettes     Last attempt to quit: 2014     Years since quittin.2    Smokeless tobacco: Never Used   Substance and Sexual Activity    Alcohol use: No    Drug use: No    Sexual activity: Not on file   Other Topics Concern    Not on file   Social History Narrative    Not on file       Allergies   Allergen Reactions    Contrast Dye [Iodine] Anaphylaxis     Tolerates when pre medicated w/ IV solumedrol and benadryl prior to procedure     Levaquin [Levofloxacin] Nausea and Vomiting    Morphine Hives and Itching        Current Outpatient Medications   Medication Sig    warfarin (COUMADIN) 3 mg tablet TAKE 1 TABLET BY MOUTH EVERY DAY    clopidogrel (PLAVIX) 300 mg tab tab Take 2 Tabs by mouth once for 1 dose.  predniSONE (DELTASONE) 50 mg tablet Take 1 Tab by mouth daily (with breakfast) for 2 days.  diclofenac (VOLTAREN) 1 % gel Apply 2 g to affected area four (4) times daily. As needed for right knee pain    oxyCODONE-acetaminophen (PERCOCET) 7.5-325 mg per tablet Take 1 Tab by mouth two (2) times daily as needed for Pain. Max Daily Amount: 2 Tabs.  insulin aspart protamine/insulin aspart (NOVOLOG MIX 70-30 U-100 INSULN) 100 unit/mL (70-30) injection 10-40 Units by SubCUTAneous route nightly as needed (BG > 160).  amLODIPine (NORVASC) 5 mg tablet Take 5 mg by mouth daily.  isosorbide mononitrate ER (IMDUR) 120 mg CR tablet TAKE 1 TABLET BY MOUTH ONCE DAILY    allopurinol (ZYLOPRIM) 100 mg tablet TAKE 1 TABLET BY MOUTH EVERY DAY    nitroglycerin (NITROSTAT) 0.3 mg SL tablet 1 Tab by SubLINGual route every five (5) minutes as needed for Chest Pain.  carvedilol (COREG) 25 mg tablet Take 1.5 Tabs by mouth two (2) times daily (with meals).  pantoprazole (PROTONIX) 40 mg tablet TAKE 1 TABLET BY MOUTH EVERY DAY FOR HEARTBURN    albuterol (PROAIR HFA) 90 mcg/actuation inhaler Take 2 Puffs by inhalation every four (4) hours as needed for Wheezing.  ergocalciferol (VITAMIN D2) 50,000 unit capsule Take 50,000 Units by mouth every Tuesday.  aspirin 81 mg tablet Take 81 mg by mouth daily.  atorvastatin (LIPITOR) 80 mg tablet Take 80 mg by mouth nightly. No current facility-administered medications for this visit. I have reviewed the problem list, allergy list, medical history, family, social history and medications. Review of Symptoms:    Review of Systems   Constitutional: Negative for chills, fever and weight loss. HENT: Negative for nosebleeds. Eyes: Negative for blurred vision and double vision. Respiratory: Negative for cough, shortness of breath and wheezing.     Cardiovascular: Negative for chest pain, palpitations, orthopnea, leg swelling and PND. Gastrointestinal: Negative for abdominal pain, blood in stool, diarrhea, nausea and vomiting. Musculoskeletal: Negative for joint pain. Skin: Negative for rash. Neurological: Negative for dizziness, tingling and loss of consciousness. Endo/Heme/Allergies: Does not bruise/bleed easily. Physical Exam:      General: Well developed, in no acute distress, cooperative and alert  HEENT: No carotid bruits, no JVD, trach is midline. Neck Supple, PEERL, EOM intact. Heart:  reg rate and rhythm; normal S1/S2; no murmurs, gallops or rubs.   Respiratory: Clear bilaterally x 4, no wheezing or rales. On continuous supplemental O2 via 2L NC  Abdomen:   Soft, non-tender, no distention, no masses. + BS.   Extremities:  Normal cap refill, no cyanosis, atraumatic. No edema. Left forearm AV fistula + thrill  Neuro: A&Ox3, speech clear, gait stable. Skin: Skin color is normal. No rashes or lesions.  Non diaphoretic  Vascular: 2+ pulses symmetric in all extremities    Vitals:    02/13/19 0905   BP: 118/58   Pulse: 83   Resp: 18   SpO2: (!) 87%   Weight: 303 lb 8 oz (137.7 kg)   Height: 5' 10\" (1.778 m)       Cardiographics    ECG: sinus rhythm  Results for orders placed or performed during the hospital encounter of 05/30/18   EKG, 12 LEAD, INITIAL   Result Value Ref Range    Ventricular Rate 71 BPM    Atrial Rate 71 BPM    P-R Interval 192 ms    QRS Duration 76 ms    Q-T Interval 410 ms    QTC Calculation (Bezet) 445 ms    Calculated P Axis 51 degrees    Calculated R Axis 12 degrees    Calculated T Axis 50 degrees    Diagnosis       Normal sinus rhythm  Normal ECG  Confirmed by Howie Amado MD., Khalif ([id number]) on 6/8/2018 2:32:37 PM         Cardiology Labs:  Lab Results   Component Value Date/Time    Cholesterol, total 176 11/09/2014 03:30 AM    HDL Cholesterol 64 11/09/2014 03:30 AM    LDL, calculated 92.6 11/09/2014 03:30 AM    Triglyceride 97 11/09/2014 03:30 AM CHOL/HDL Ratio 2.8 11/09/2014 03:30 AM       Lab Results   Component Value Date/Time    Sodium 141 01/18/2019 12:58 PM    Potassium 4.4 01/18/2019 12:58 PM    Chloride 101 01/18/2019 12:58 PM    CO2 29 01/18/2019 12:58 PM    Anion gap 11 01/18/2019 12:58 PM    Glucose 111 (H) 01/18/2019 12:58 PM    BUN 55 (H) 01/18/2019 12:58 PM    Creatinine 9.61 (H) 01/18/2019 12:58 PM    BUN/Creatinine ratio 6 (L) 01/18/2019 12:58 PM    GFR est AA 5 (L) 01/18/2019 12:58 PM    GFR est non-AA 4 (L) 01/18/2019 12:58 PM    Calcium 8.6 01/18/2019 12:58 PM    Bilirubin, total 0.5 06/26/2018 04:10 PM    AST (SGOT) 17 06/26/2018 04:10 PM    Alk. phosphatase 120 (H) 06/26/2018 04:10 PM    Protein, total 7.5 06/26/2018 04:10 PM    Albumin 3.4 (L) 06/26/2018 04:10 PM    Globulin 4.1 (H) 06/26/2018 04:10 PM    A-G Ratio 0.8 (L) 06/26/2018 04:10 PM    ALT (SGPT) 14 06/26/2018 04:10 PM           Assessment:     Assessment:       ICD-10-CM ICD-9-CM    1. Atherosclerosis of native coronary artery of native heart, angina presence unspecified I25.10 414.01 AMB POC EKG ROUTINE W/ 12 LEADS, INTER & REP      clopidogrel (PLAVIX) 300 mg tab tab      METABOLIC PANEL, COMPREHENSIVE      CBC W/O DIFF      PROTHROMBIN TIME + INR      CASE REQUEST CATH LAB      predniSONE (DELTASONE) 50 mg tablet   2. (HFpEF) heart failure with preserved ejection fraction (HCC) I50.30 428.9    3. Essential hypertension I10 401.9         Plan:     1. Atherosclerosis of native coronary artery of native heart, angina presence unspecified  Cath in 1/29/19 with  of the RCA; will arrange for cardiac cath with plans to intervene on RCA . Plan to ultrasound right radial artery, but may ultimately require bifemoral approach. Hold Coumadin until procedure. Load with 600 mg Plavix the day BEFORE the procedure.  Will also provide 50 mg Prednisone to take evening before procedure and 50 mg morning of procedure, along with 25 mg Benadryl morning of procedure given iodine allergy. Continue ASA. Will try and arrange the procedure on the day before her scheduled dialysis session.    - AMB POC EKG ROUTINE W/ 12 LEADS, INTER & REP    2. (HFpEF) heart failure with preserved ejection fraction ([address]  Echo in 12/2017 with preserved LVEF 60%. Appears euvolemic on exam today; continue with dialysis sessions as scheduled for fluid management. 3. Essential hypertension  BP controlled. Continue anti-hypertensive therapy. CHRISTIANO Self-BC    Patient seen and examined by me with nurse practitioner. Abbie Garcia personally performed all components of the history, physical, and medical decision making and agree with the assessment and plan with minor modifications as noted. FC III angina with  RCA on multiple antianginal meds. Plan  PCI for symptom relief. Films reviewed personally. Short  without prox cap ambiguity.   Prob bifemoral.      Carlyle Cortes MD

## 2019-02-14 LAB
ALBUMIN SERPL-MCNC: 3.8 G/DL (ref 3.6–4.8)
ALBUMIN/GLOB SERPL: 1.2 {RATIO} (ref 1.2–2.2)
ALP SERPL-CCNC: 141 IU/L (ref 39–117)
ALT SERPL-CCNC: 12 IU/L (ref 0–32)
AST SERPL-CCNC: 13 IU/L (ref 0–40)
BILIRUB SERPL-MCNC: 0.5 MG/DL (ref 0–1.2)
BUN SERPL-MCNC: 21 MG/DL (ref 8–27)
BUN/CREAT SERPL: 4 (ref 12–28)
CALCIUM SERPL-MCNC: 9.1 MG/DL (ref 8.7–10.3)
CHLORIDE SERPL-SCNC: 94 MMOL/L (ref 96–106)
CO2 SERPL-SCNC: 25 MMOL/L (ref 20–29)
CREAT SERPL-MCNC: 5.18 MG/DL (ref 0.57–1)
ERYTHROCYTE [DISTWIDTH] IN BLOOD BY AUTOMATED COUNT: 15 % (ref 12.3–15.4)
GLOBULIN SER CALC-MCNC: 3.3 G/DL (ref 1.5–4.5)
GLUCOSE SERPL-MCNC: 117 MG/DL (ref 65–99)
HCT VFR BLD AUTO: 30.7 % (ref 34–46.6)
HGB BLD-MCNC: 9.9 G/DL (ref 11.1–15.9)
INR PPP: 6.4 (ref 0.8–1.2)
INTERPRETATION: NORMAL
MCH RBC QN AUTO: 31.7 PG (ref 26.6–33)
MCHC RBC AUTO-ENTMCNC: 32.2 G/DL (ref 31.5–35.7)
MCV RBC AUTO: 98 FL (ref 79–97)
PLATELET # BLD AUTO: 334 X10E3/UL (ref 150–379)
POTASSIUM SERPL-SCNC: 4.5 MMOL/L (ref 3.5–5.2)
PROT SERPL-MCNC: 7.1 G/DL (ref 6–8.5)
PROTHROMBIN TIME: 60 SEC (ref 9.1–12)
RBC # BLD AUTO: 3.12 X10E6/UL (ref 3.77–5.28)
SODIUM SERPL-SCNC: 139 MMOL/L (ref 134–144)
WBC # BLD AUTO: 8.6 X10E3/UL (ref 3.4–10.8)

## 2019-02-14 NOTE — PROGRESS NOTES
Maryam,    The patient's INR is very high. She should hold her Coumadin until the date of her cardiac cath procedure. I would like for her to see the clinic where she has her INR checked on Friday for another INR check, and then again on Monday, to make sure we're heading in the right direction. Other labs are okay. Her creatinine is not concerning as she has ESRD and is on dialysis MWF. Thanks!

## 2019-02-18 ENCOUNTER — OFFICE VISIT (OUTPATIENT)
Dept: FAMILY MEDICINE CLINIC | Age: 64
End: 2019-02-18

## 2019-02-18 VITALS
BODY MASS INDEX: 41.95 KG/M2 | OXYGEN SATURATION: 98 % | RESPIRATION RATE: 18 BRPM | HEIGHT: 70 IN | WEIGHT: 293 LBS | HEART RATE: 76 BPM | TEMPERATURE: 99 F | SYSTOLIC BLOOD PRESSURE: 98 MMHG | DIASTOLIC BLOOD PRESSURE: 57 MMHG

## 2019-02-18 DIAGNOSIS — Z79.01 WARFARIN ANTICOAGULATION: Primary | ICD-10-CM

## 2019-02-18 LAB
AMPHETAMINES SERPL QL SCN: NEGATIVE NG/ML
BARBITURATES SERPL QL SCN: NEGATIVE UG/ML
BENZODIAZ SERPL QL SCN: NEGATIVE NG/ML
CANNABINOIDS SERPL QL SCN: NEGATIVE NG/ML
COCAINE+BZE SERPL QL SCN: NEGATIVE NG/ML
ETHANOL UR-MCNC: NEGATIVE GM/DL
INR BLD: 1.7
METHADONE SERPL QL SCN: NEGATIVE NG/ML
OPIATES SERPL QL SCN: NEGATIVE NG/ML
OXYCOCONE: NORMAL NG/ML
OXYCODONES CONFIRMATION, 738393: NORMAL
OXYCODONES, 738315: NORMAL NG/ML
OXYMORPHONE, 763902: NORMAL NG/ML
PCP SERPL QL SCN: NEGATIVE NG/ML
PROPOXYPH SERPL QL SCN: NEGATIVE NG/ML
PT POC: 20.6 SECONDS
VALID INTERNAL CONTROL?: YES

## 2019-02-19 ENCOUNTER — HOSPITAL ENCOUNTER (OUTPATIENT)
Age: 64
Discharge: HOME | End: 2019-02-20
Attending: INTERNAL MEDICINE | Admitting: INTERNAL MEDICINE
Payer: MEDICARE

## 2019-02-19 DIAGNOSIS — I24.9 ACS (ACUTE CORONARY SYNDROME) (HCC): ICD-10-CM

## 2019-02-19 PROBLEM — Z98.890 S/P CARDIAC CATH: Status: ACTIVE | Noted: 2019-02-19

## 2019-02-19 LAB
GLUCOSE BLD STRIP.AUTO-MCNC: 120 MG/DL (ref 65–100)
GLUCOSE BLD STRIP.AUTO-MCNC: 127 MG/DL (ref 65–100)
GLUCOSE BLD STRIP.AUTO-MCNC: 131 MG/DL (ref 65–100)
GLUCOSE BLD STRIP.AUTO-MCNC: 299 MG/DL (ref 65–100)
INR BLD: 1.5 (ref 0.9–1.2)
SERVICE CMNT-IMP: ABNORMAL

## 2019-02-19 PROCEDURE — C1769 GUIDE WIRE: HCPCS | Performed by: INTERNAL MEDICINE

## 2019-02-19 PROCEDURE — C1887 CATHETER, GUIDING: HCPCS | Performed by: INTERNAL MEDICINE

## 2019-02-19 PROCEDURE — 82962 GLUCOSE BLOOD TEST: CPT

## 2019-02-19 PROCEDURE — 85610 PROTHROMBIN TIME: CPT

## 2019-02-19 PROCEDURE — 77030012468 HC VLV BLEEDBK CNTRL ABBT -B: Performed by: INTERNAL MEDICINE

## 2019-02-19 PROCEDURE — 93454 CORONARY ARTERY ANGIO S&I: CPT | Performed by: INTERNAL MEDICINE

## 2019-02-19 PROCEDURE — 77030015766: Performed by: INTERNAL MEDICINE

## 2019-02-19 PROCEDURE — 74011250636 HC RX REV CODE- 250/636

## 2019-02-19 PROCEDURE — 74011636320 HC RX REV CODE- 636/320: Performed by: INTERNAL MEDICINE

## 2019-02-19 PROCEDURE — 85347 COAGULATION TIME ACTIVATED: CPT

## 2019-02-19 PROCEDURE — 77030008543 HC TBNG MON PRSS MRTM -A: Performed by: INTERNAL MEDICINE

## 2019-02-19 PROCEDURE — 74011250637 HC RX REV CODE- 250/637: Performed by: NURSE PRACTITIONER

## 2019-02-19 PROCEDURE — 77030019698 HC SYR ANGI MDLON MRTM -A: Performed by: INTERNAL MEDICINE

## 2019-02-19 PROCEDURE — C1874 STENT, COATED/COV W/DEL SYS: HCPCS | Performed by: INTERNAL MEDICINE

## 2019-02-19 PROCEDURE — 77030018729 HC ELECTRD DEFIB PAD CARD -B: Performed by: INTERNAL MEDICINE

## 2019-02-19 PROCEDURE — 99153 MOD SED SAME PHYS/QHP EA: CPT | Performed by: INTERNAL MEDICINE

## 2019-02-19 PROCEDURE — C1725 CATH, TRANSLUMIN NON-LASER: HCPCS | Performed by: INTERNAL MEDICINE

## 2019-02-19 PROCEDURE — C1760 CLOSURE DEV, VASC: HCPCS | Performed by: INTERNAL MEDICINE

## 2019-02-19 PROCEDURE — 99152 MOD SED SAME PHYS/QHP 5/>YRS: CPT | Performed by: INTERNAL MEDICINE

## 2019-02-19 PROCEDURE — 93005 ELECTROCARDIOGRAM TRACING: CPT

## 2019-02-19 PROCEDURE — 74011636637 HC RX REV CODE- 636/637: Performed by: NURSE PRACTITIONER

## 2019-02-19 PROCEDURE — 92943 PRQ TRLUML REVSC CH OCC ANT: CPT | Performed by: INTERNAL MEDICINE

## 2019-02-19 PROCEDURE — 77030019569 HC BND COMPR RAD TERU -B: Performed by: INTERNAL MEDICINE

## 2019-02-19 PROCEDURE — 74011250636 HC RX REV CODE- 250/636: Performed by: INTERNAL MEDICINE

## 2019-02-19 PROCEDURE — 74011250637 HC RX REV CODE- 250/637: Performed by: INTERNAL MEDICINE

## 2019-02-19 PROCEDURE — C1894 INTRO/SHEATH, NON-LASER: HCPCS | Performed by: INTERNAL MEDICINE

## 2019-02-19 DEVICE — EVEROLIMUS-ELUTING PLATINUM CHROMIUM CORONARY STENT SYSTEM
Type: IMPLANTABLE DEVICE | Status: FUNCTIONAL
Brand: SYNERGY™

## 2019-02-19 RX ORDER — HYDROMORPHONE HYDROCHLORIDE 1 MG/ML
1 INJECTION, SOLUTION INTRAMUSCULAR; INTRAVENOUS; SUBCUTANEOUS ONCE
Status: COMPLETED | OUTPATIENT
Start: 2019-02-19 | End: 2019-02-19

## 2019-02-19 RX ORDER — SODIUM CHLORIDE 0.9 % (FLUSH) 0.9 %
5-40 SYRINGE (ML) INJECTION EVERY 8 HOURS
Status: DISCONTINUED | OUTPATIENT
Start: 2019-02-19 | End: 2019-02-20 | Stop reason: HOSPADM

## 2019-02-19 RX ORDER — CARVEDILOL 12.5 MG/1
37.5 TABLET ORAL 2 TIMES DAILY WITH MEALS
Status: DISCONTINUED | OUTPATIENT
Start: 2019-02-19 | End: 2019-02-20 | Stop reason: HOSPADM

## 2019-02-19 RX ORDER — ATORVASTATIN CALCIUM 40 MG/1
80 TABLET, FILM COATED ORAL
Status: DISCONTINUED | OUTPATIENT
Start: 2019-02-19 | End: 2019-02-20 | Stop reason: HOSPADM

## 2019-02-19 RX ORDER — AMLODIPINE BESYLATE 5 MG/1
5 TABLET ORAL DAILY
Status: DISCONTINUED | OUTPATIENT
Start: 2019-02-20 | End: 2019-02-20 | Stop reason: HOSPADM

## 2019-02-19 RX ORDER — HYDROMORPHONE HYDROCHLORIDE 1 MG/ML
1 INJECTION, SOLUTION INTRAMUSCULAR; INTRAVENOUS; SUBCUTANEOUS ONCE
Status: DISCONTINUED | OUTPATIENT
Start: 2019-02-19 | End: 2019-02-19 | Stop reason: SDUPTHER

## 2019-02-19 RX ORDER — HEPARIN SODIUM 200 [USP'U]/100ML
INJECTION, SOLUTION INTRAVENOUS
Status: DISCONTINUED | OUTPATIENT
Start: 2019-02-19 | End: 2019-02-19

## 2019-02-19 RX ORDER — LIDOCAINE HYDROCHLORIDE 10 MG/ML
INJECTION, SOLUTION EPIDURAL; INFILTRATION; INTRACAUDAL; PERINEURAL AS NEEDED
Status: DISCONTINUED | OUTPATIENT
Start: 2019-02-19 | End: 2019-02-19 | Stop reason: HOSPADM

## 2019-02-19 RX ORDER — OXYCODONE AND ACETAMINOPHEN 7.5; 325 MG/1; MG/1
1 TABLET ORAL
Status: DISCONTINUED | OUTPATIENT
Start: 2019-02-19 | End: 2019-02-20 | Stop reason: HOSPADM

## 2019-02-19 RX ORDER — MAGNESIUM SULFATE 100 %
4 CRYSTALS MISCELLANEOUS AS NEEDED
Status: DISCONTINUED | OUTPATIENT
Start: 2019-02-19 | End: 2019-02-20 | Stop reason: HOSPADM

## 2019-02-19 RX ORDER — HYDROCORTISONE SODIUM SUCCINATE 100 MG/2ML
100 INJECTION, POWDER, FOR SOLUTION INTRAMUSCULAR; INTRAVENOUS ONCE
Status: COMPLETED | OUTPATIENT
Start: 2019-02-19 | End: 2019-02-19

## 2019-02-19 RX ORDER — CLOPIDOGREL BISULFATE 75 MG/1
TABLET ORAL AS NEEDED
Status: DISCONTINUED | OUTPATIENT
Start: 2019-02-19 | End: 2019-02-19 | Stop reason: HOSPADM

## 2019-02-19 RX ORDER — ACETAMINOPHEN 325 MG/1
650 TABLET ORAL
Status: DISCONTINUED | OUTPATIENT
Start: 2019-02-19 | End: 2019-02-20 | Stop reason: HOSPADM

## 2019-02-19 RX ORDER — SODIUM CHLORIDE 0.9 % (FLUSH) 0.9 %
5-40 SYRINGE (ML) INJECTION AS NEEDED
Status: DISCONTINUED | OUTPATIENT
Start: 2019-02-19 | End: 2019-02-20 | Stop reason: HOSPADM

## 2019-02-19 RX ORDER — INSULIN LISPRO 100 [IU]/ML
INJECTION, SOLUTION INTRAVENOUS; SUBCUTANEOUS
Status: DISCONTINUED | OUTPATIENT
Start: 2019-02-19 | End: 2019-02-20 | Stop reason: HOSPADM

## 2019-02-19 RX ORDER — PANTOPRAZOLE SODIUM 40 MG/1
40 TABLET, DELAYED RELEASE ORAL
Status: DISCONTINUED | OUTPATIENT
Start: 2019-02-20 | End: 2019-02-20 | Stop reason: HOSPADM

## 2019-02-19 RX ORDER — ASPIRIN 81 MG/1
81 TABLET ORAL DAILY
Status: DISCONTINUED | OUTPATIENT
Start: 2019-02-20 | End: 2019-02-20 | Stop reason: HOSPADM

## 2019-02-19 RX ORDER — HEPARIN SODIUM 1000 [USP'U]/ML
INJECTION, SOLUTION INTRAVENOUS; SUBCUTANEOUS AS NEEDED
Status: DISCONTINUED | OUTPATIENT
Start: 2019-02-19 | End: 2019-02-19 | Stop reason: HOSPADM

## 2019-02-19 RX ORDER — FENTANYL CITRATE 50 UG/ML
INJECTION, SOLUTION INTRAMUSCULAR; INTRAVENOUS AS NEEDED
Status: DISCONTINUED | OUTPATIENT
Start: 2019-02-19 | End: 2019-02-19 | Stop reason: HOSPADM

## 2019-02-19 RX ORDER — MIDAZOLAM HYDROCHLORIDE 1 MG/ML
INJECTION, SOLUTION INTRAMUSCULAR; INTRAVENOUS AS NEEDED
Status: DISCONTINUED | OUTPATIENT
Start: 2019-02-19 | End: 2019-02-19 | Stop reason: HOSPADM

## 2019-02-19 RX ORDER — CLOPIDOGREL BISULFATE 75 MG/1
75 TABLET ORAL DAILY
Status: DISCONTINUED | OUTPATIENT
Start: 2019-02-20 | End: 2019-02-20 | Stop reason: HOSPADM

## 2019-02-19 RX ORDER — ISOSORBIDE MONONITRATE 30 MG/1
120 TABLET, EXTENDED RELEASE ORAL DAILY
Status: DISCONTINUED | OUTPATIENT
Start: 2019-02-20 | End: 2019-02-20 | Stop reason: HOSPADM

## 2019-02-19 RX ORDER — DIPHENHYDRAMINE HYDROCHLORIDE 50 MG/ML
50 INJECTION, SOLUTION INTRAMUSCULAR; INTRAVENOUS ONCE
Status: COMPLETED | OUTPATIENT
Start: 2019-02-19 | End: 2019-02-19

## 2019-02-19 RX ORDER — DEXTROSE 50 % IN WATER (D50W) INTRAVENOUS SYRINGE
12.5-25 AS NEEDED
Status: DISCONTINUED | OUTPATIENT
Start: 2019-02-19 | End: 2019-02-20 | Stop reason: HOSPADM

## 2019-02-19 RX ORDER — ALLOPURINOL 100 MG/1
100 TABLET ORAL DAILY
Status: DISCONTINUED | OUTPATIENT
Start: 2019-02-20 | End: 2019-02-20 | Stop reason: HOSPADM

## 2019-02-19 RX ORDER — ALBUTEROL SULFATE 90 UG/1
2 AEROSOL, METERED RESPIRATORY (INHALATION)
Status: DISCONTINUED | OUTPATIENT
Start: 2019-02-19 | End: 2019-02-20 | Stop reason: HOSPADM

## 2019-02-19 RX ADMIN — HYDROCORTISONE SODIUM SUCCINATE 100 MG: 100 INJECTION, POWDER, FOR SOLUTION INTRAMUSCULAR; INTRAVENOUS at 12:08

## 2019-02-19 RX ADMIN — OXYCODONE HYDROCHLORIDE AND ACETAMINOPHEN 1 TABLET: 7.5; 325 TABLET ORAL at 17:41

## 2019-02-19 RX ADMIN — CARVEDILOL 37.5 MG: 12.5 TABLET, FILM COATED ORAL at 17:41

## 2019-02-19 RX ADMIN — HYDROMORPHONE HYDROCHLORIDE 1 MG: 1 INJECTION, SOLUTION INTRAMUSCULAR; INTRAVENOUS; SUBCUTANEOUS at 21:56

## 2019-02-19 RX ADMIN — HYDROMORPHONE HYDROCHLORIDE 1 MG: 1 INJECTION, SOLUTION INTRAMUSCULAR; INTRAVENOUS; SUBCUTANEOUS at 19:26

## 2019-02-19 RX ADMIN — Medication 10 ML: at 21:57

## 2019-02-19 RX ADMIN — DIPHENHYDRAMINE HYDROCHLORIDE 50 MG: 50 INJECTION, SOLUTION INTRAMUSCULAR; INTRAVENOUS at 12:08

## 2019-02-19 RX ADMIN — INSULIN LISPRO 3 UNITS: 100 INJECTION, SOLUTION INTRAVENOUS; SUBCUTANEOUS at 21:55

## 2019-02-19 RX ADMIN — ATORVASTATIN CALCIUM 80 MG: 40 TABLET, FILM COATED ORAL at 21:55

## 2019-02-19 NOTE — Clinical Note
Lesion located in the Ostium RCA. Balloon inserted. Balloon inflated using multiple inflations inflation technique. Pressure = 8 haresh; Duration = 12 sec. Inflation 2: Pressure: 8 haresh; Duration: 8 sec.

## 2019-02-19 NOTE — CONSULTS
Nephrology Progress Note     Manjit Roblero     [facility]                  Phone - [phone]   Patient: Polly Panchal   Date- 2/19/2019        Admit Date: 2/19/2019      YOB: 1955         CC: Follow up for esrd          Subjective: Interval History:   -   is aa female with pmh of esrd. She Is on hd at [employer]. On mwf  Last hd on Monday  She had cardiac cath today  No c/o sob,   No c/o chest pain,   No c/o nausea or vomiting  No c/o  fever. ROS:- as above   Assessment:   · esrd mwf  · Anemia of ckd  · htn  · S/p cardiac cath  · Sec. hyperpara     Plan:   · No hd today  · Next hd tomorrow  · Stop ivf  · Continue norvasc, coreg    Physical Exam:   GEN: NAD  NECK- Supple, no mass  RESP: Clear b/l, no wheezing, No accessory muscle use  CVS: RRR,S1,S2    ABDO: soft , non tender, No mass  EXT: No Edema   NEURO: normal speech, non focal  Left arm avf +    Care Plan discussed with: patient  Objective:   Patient Vitals for the past 24 hrs:   Temp Pulse Resp BP SpO2   02/19/19 1600  73  149/69 95 %   02/19/19 1545  74  148/64 92 %   02/19/19 1530  65  148/71 99 %   02/19/19 1515  69  146/63 99 %   02/19/19 1500  75  147/59 97 %   02/19/19 1457 97.5 °F (36.4 °C) 74 20 148/57 96 %   02/19/19 1253  (!) 59 22 137/67 98 %   02/19/19 1137 97.8 °F (36.6 °C) 68 18 115/48 96 %     Last 3 Recorded Weights in this Encounter    02/19/19 1137   Weight: 137.4 kg (303 lb)     No intake/output data recorded. Chart reviewed. Pertinent Notes reviewed.        Medication list  reviewed   Current Facility-Administered Medications   Medication    albuterol (PROVENTIL HFA, VENTOLIN HFA, PROAIR HFA) inhaler 2 Puff    [START ON 2/20/2019] allopurinol (ZYLOPRIM) tablet 100 mg    [START ON 2/20/2019] amLODIPine (NORVASC) tablet 5 mg    [START ON 2/20/2019] aspirin delayed-release tablet 81 mg    atorvastatin (LIPITOR) tablet 80 mg    carvedilol (COREG) tablet 37.5 mg    [START ON 2/20/2019] isosorbide mononitrate ER (IMDUR) tablet 120 mg    oxyCODONE-acetaminophen (PERCOCET 7.5) 7.5-325 mg per tablet 1 Tab    [START ON 2/20/2019] pantoprazole (PROTONIX) tablet 40 mg    sodium chloride (NS) flush 5-40 mL    sodium chloride (NS) flush 5-40 mL    acetaminophen (TYLENOL) tablet 650 mg    insulin lispro (HUMALOG) injection    glucose chewable tablet 16 g    dextrose (D50W) injection syrg 12.5-25 g    glucagon (GLUCAGEN) injection 1 mg    [START ON 2/20/2019] clopidogrel (PLAVIX) tablet 75 mg           Data Review :  Recent Labs     02/19/19  1138 02/18/19  1610   INR 1.5* 1.7     Lab Results   Component Value Date/Time    Culture result: NO GROWTH 6 DAYS 02/28/2017 04:26 PM    Culture result: HEAVY  NORMAL RESPIRATORY WAGNER   02/03/2017 04:10 PM    Culture result: MODERATE  CANDIDA TROPICALIS   (A) 02/03/2017 04:10 PM    Culture result:  02/03/2017 04:10 PM     CULTURE WILL BE HELD FOR 4 WEEKS. IF THERE IS ADDITIONAL FUNGAL GROWTH, A NEW REPORT WILL FOLLOW. Lab Results   Component Value Date/Time    Specimen Description: BLOOD 10/14/2013 03:37 AM    Specimen Description: BLOOD 10/11/2013 12:03 AM    Specimen Description: NARES 07/12/2013 04:50 AM     No results for input(s): FE, TIBC, PSAT, FERR in the last 72 hours. Lab Results   Component Value Date/Time    Sodium,urine random 25 11/10/2014 12:40 PM    Creatinine, urine 145.29 03/04/2017 05:01 AM     Najma Saavedra MD  [facility]   [email or web address]  [facility]  [address], [address], [address]  Phone - [phone]         Fax - [phone]

## 2019-02-19 NOTE — INTERVAL H&P NOTE
H&P Update: 
Triny Mirza was seen and examined. History and physical has been reviewed. The patient has been examined.  There have been no significant clinical changes since the completion of the originally dated History and Physical. 
 
Signed By: Abrahan Ackerman MD   
 February 19, 2019 12:56 PM

## 2019-02-19 NOTE — Clinical Note
Lesion: Located in the Ostium RCA. Stent inserted. Single technique used. First inflation pressure = 18 haresh; inflation time: 30 sec.

## 2019-02-19 NOTE — PROGRESS NOTES
1740: 7.5-325 of percocet given. Pain 8/10 
 
1850: pt still c/o back and leg/knee pain. Dr. Lexis Lobo on-call MD called received order for 1mg IV dilaudid for now and 1mg IV dilaudid for I hour from now if pt still in pain and VS still stable. Bedside and Verbal shift change report given to Samara (oncoming nurse) by Evangelina Leo (offgoing nurse). Report included the following information SBAR, Kardex, Intake/Output, MAR, Accordion and Recent Results.

## 2019-02-19 NOTE — Clinical Note
TRANSFER - OUT REPORT:  
 
Verbal report given to: [facility], RN. Report consisted of patient's Situation, Background, Assessment and  
Recommendations(SBAR). Opportunity for questions and clarification was provided. Patient transported with a Registered Nurse and [address] [place] [address] / Patient Care Tech. Patient transported to: IVCU.

## 2019-02-19 NOTE — PROGRESS NOTES
Cardiac Cath Lab Recovery Arrival Note: 
 
 
Polly Panchal arrived to Cardiac Cath Lab, Recovery Area. Staff introduced to patient. Patient identifiers verified with NAME and DATE OF BIRTH. Procedure verified with patient. Consent forms reviewed and signed by patient or authorized representative and verified. Allergies verified. Patient and family oriented to department. Patient and family informed of procedure and plan of care. Questions answered with review. Patient prepped for procedure, per orders from physician, prior to arrival. 
 
Patient on cardiac monitor, non-invasive blood pressure, SPO2 monitor. On O2 at 2 lpm nc. Patient is A&Ox 3. Patient reports no c/o. Patient in stretcher, in low position, with side rails up, call bell within reach, patient instructed to call if assistance as needed. Patient prep in: [address], [place] [address]. Patient family has pager # none Family in: [facility] waiting area.   
Prep by: Mane Chowdhury RN and Miguel A Persaud RN

## 2019-02-19 NOTE — Clinical Note
Lesion located in the Ostium RCA. Balloon inserted. Balloon inflated using multiple inflations inflation technique. Pressure = 18 haresh; Duration = 15 sec. Inflation 2: Pressure: 18 haresh; Duration: 15 sec. Inflation 3: Pressure: 18 haresh; Duration: 15 sec.

## 2019-02-19 NOTE — Clinical Note
Mallampati: Class III - soft palate, base of uvula visible. ASA: Class 2 - patient with mild systemic disease.

## 2019-02-19 NOTE — Clinical Note
Lesion located in the Ostium RCA. Balloon inserted. Balloon inflated using multiple inflations inflation technique. Pressure = 8 haresh; Duration = 8 sec. Inflation 2: Pressure: 12 haresh; Duration: 15 sec.

## 2019-02-20 VITALS
DIASTOLIC BLOOD PRESSURE: 60 MMHG | SYSTOLIC BLOOD PRESSURE: 119 MMHG | BODY MASS INDEX: 41.95 KG/M2 | HEIGHT: 70 IN | RESPIRATION RATE: 16 BRPM | TEMPERATURE: 97.5 F | OXYGEN SATURATION: 98 % | WEIGHT: 293 LBS | HEART RATE: 55 BPM

## 2019-02-20 LAB
ACT BLD: 224 SECS (ref 79–138)
ACT BLD: 285 SECS (ref 79–138)
ALBUMIN SERPL-MCNC: 2.6 G/DL (ref 3.5–5)
ANION GAP SERPL CALC-SCNC: 10 MMOL/L (ref 5–15)
ATRIAL RATE: 71 BPM
BUN SERPL-MCNC: 46 MG/DL (ref 6–20)
BUN/CREAT SERPL: 5 (ref 12–20)
CALCIUM SERPL-MCNC: 8.4 MG/DL (ref 8.5–10.1)
CALCULATED P AXIS, ECG09: 54 DEGREES
CALCULATED R AXIS, ECG10: 4 DEGREES
CALCULATED T AXIS, ECG11: 44 DEGREES
CHLORIDE SERPL-SCNC: 102 MMOL/L (ref 97–108)
CO2 SERPL-SCNC: 26 MMOL/L (ref 21–32)
COMMENT, HOLDF: NORMAL
CREAT SERPL-MCNC: 8.55 MG/DL (ref 0.55–1.02)
DIAGNOSIS, 93000: NORMAL
ERYTHROCYTE [DISTWIDTH] IN BLOOD BY AUTOMATED COUNT: 14.5 % (ref 11.5–14.5)
GLUCOSE BLD STRIP.AUTO-MCNC: 197 MG/DL (ref 65–100)
GLUCOSE BLD STRIP.AUTO-MCNC: 240 MG/DL (ref 65–100)
GLUCOSE BLD STRIP.AUTO-MCNC: 272 MG/DL (ref 65–100)
GLUCOSE SERPL-MCNC: 204 MG/DL (ref 65–100)
HBV SURFACE AB SER QL: NONREACTIVE
HBV SURFACE AB SER-ACNC: 4.76 MIU/ML
HBV SURFACE AG SER QL: <0.1 INDEX
HBV SURFACE AG SER QL: NEGATIVE
HCT VFR BLD AUTO: 30.1 % (ref 35–47)
HGB BLD-MCNC: 9.3 G/DL (ref 11.5–16)
MCH RBC QN AUTO: 31.8 PG (ref 26–34)
MCHC RBC AUTO-ENTMCNC: 30.9 G/DL (ref 30–36.5)
MCV RBC AUTO: 103.1 FL (ref 80–99)
NRBC # BLD: 0 K/UL (ref 0–0.01)
NRBC BLD-RTO: 0 PER 100 WBC
P-R INTERVAL, ECG05: 198 MS
PHOSPHATE SERPL-MCNC: 3.7 MG/DL (ref 2.6–4.7)
PLATELET # BLD AUTO: 356 K/UL (ref 150–400)
PMV BLD AUTO: 9.9 FL (ref 8.9–12.9)
POTASSIUM SERPL-SCNC: 4.8 MMOL/L (ref 3.5–5.1)
Q-T INTERVAL, ECG07: 420 MS
QRS DURATION, ECG06: 78 MS
QTC CALCULATION (BEZET), ECG08: 456 MS
RBC # BLD AUTO: 2.92 M/UL (ref 3.8–5.2)
SAMPLES BEING HELD,HOLD: NORMAL
SERVICE CMNT-IMP: ABNORMAL
SODIUM SERPL-SCNC: 138 MMOL/L (ref 136–145)
VENTRICULAR RATE, ECG03: 71 BPM
WBC # BLD AUTO: 14.5 K/UL (ref 3.6–11)

## 2019-02-20 PROCEDURE — 80069 RENAL FUNCTION PANEL: CPT

## 2019-02-20 PROCEDURE — 74011636637 HC RX REV CODE- 636/637: Performed by: NURSE PRACTITIONER

## 2019-02-20 PROCEDURE — 74011250637 HC RX REV CODE- 250/637: Performed by: NURSE PRACTITIONER

## 2019-02-20 PROCEDURE — 87340 HEPATITIS B SURFACE AG IA: CPT

## 2019-02-20 PROCEDURE — 86706 HEP B SURFACE ANTIBODY: CPT

## 2019-02-20 PROCEDURE — 77010033678 HC OXYGEN DAILY

## 2019-02-20 PROCEDURE — 82962 GLUCOSE BLOOD TEST: CPT

## 2019-02-20 PROCEDURE — 36415 COLL VENOUS BLD VENIPUNCTURE: CPT

## 2019-02-20 PROCEDURE — 90935 HEMODIALYSIS ONE EVALUATION: CPT

## 2019-02-20 PROCEDURE — 85027 COMPLETE CBC AUTOMATED: CPT

## 2019-02-20 RX ORDER — CLOPIDOGREL BISULFATE 75 MG/1
75 TABLET ORAL DAILY
Qty: 30 TAB | Refills: 11 | Status: SHIPPED | OUTPATIENT
Start: 2019-02-20 | End: 2019-12-11 | Stop reason: ALTCHOICE

## 2019-02-20 RX ADMIN — CLOPIDOGREL BISULFATE 75 MG: 75 TABLET, FILM COATED ORAL at 09:36

## 2019-02-20 RX ADMIN — ISOSORBIDE MONONITRATE 120 MG: 30 TABLET, EXTENDED RELEASE ORAL at 16:22

## 2019-02-20 RX ADMIN — PANTOPRAZOLE SODIUM 40 MG: 40 TABLET, DELAYED RELEASE ORAL at 09:36

## 2019-02-20 RX ADMIN — OXYCODONE HYDROCHLORIDE AND ACETAMINOPHEN 1 TABLET: 7.5; 325 TABLET ORAL at 11:53

## 2019-02-20 RX ADMIN — ASPIRIN 81 MG: 81 TABLET ORAL at 09:36

## 2019-02-20 RX ADMIN — Medication 10 ML: at 13:58

## 2019-02-20 RX ADMIN — ALLOPURINOL 100 MG: 100 TABLET ORAL at 09:36

## 2019-02-20 RX ADMIN — OXYCODONE HYDROCHLORIDE AND ACETAMINOPHEN 1 TABLET: 7.5; 325 TABLET ORAL at 04:44

## 2019-02-20 RX ADMIN — CARVEDILOL 37.5 MG: 12.5 TABLET, FILM COATED ORAL at 16:22

## 2019-02-20 RX ADMIN — INSULIN LISPRO 2 UNITS: 100 INJECTION, SOLUTION INTRAVENOUS; SUBCUTANEOUS at 09:37

## 2019-02-20 RX ADMIN — INSULIN LISPRO 3 UNITS: 100 INJECTION, SOLUTION INTRAVENOUS; SUBCUTANEOUS at 11:45

## 2019-02-20 RX ADMIN — AMLODIPINE BESYLATE 5 MG: 5 TABLET ORAL at 16:22

## 2019-02-20 NOTE — PROGRESS NOTES
Bedside shift change report given to me  (oncoming nurse) by Surya Choi RN  (offgoing nurse). Report given with SBAR, MAR and Recent Results. 2053 pt requesting additional dilaudid dose previously ordered. 2230 pt up to bathroom with assistance , voided CHG bath done , gown changed . Pt medicated for pain at 2200 . 2330 Bedside shift change report given to [id number]  (oncoming nurse) by me (offgoing nurse). Report given with SBAR, MAR and Recent Results.

## 2019-02-20 NOTE — PROCEDURES
[facility]  [phone]    Vitals   Pre   Post   Assessment   Pre   Post     Temp  Temp: 97.6 °F (36.4 °C) (02/20/19 1230)  97.5 LOC  A&Ox4 A&Ox4   HR   Pulse (Heart Rate): 70 (02/20/19 1230) 55 Lungs   Diminished bases Diminished bases   B/P   BP: 126/68 (02/20/19 1230) 119/60 Cardiac   RRR RRR   Resp   Resp Rate: 18 (02/20/19 1230) 18 Skin   R femoral incision R femoral incision   Pain level  Pain Intensity 1: 4 (02/20/19 1104) 0 Edema  +1 BLE +1 BLE   Orders:    Duration:   Start:    1230 End:    1600 Total:   3.5 hours   Dialyzer:   Dialyzer/Set Up Inspection: Jorge Diaz (02/20/19 1230)   K Bath:   Dialysate K (mEq/L): 2 (02/20/19 1230)   Ca Bath:   Dialysate CA (mEq/L): 3.0 (02/20/19 1230)   Na/Bicarb:   Dialysate NA (mEq/L): 140 (02/20/19 1230)   Target Fluid Removal:   Goal/Amount of Fluid to Remove (mL): 3000 mL (02/20/19 1230)   Access     Type & Location:    LLE AVF, + b/t; no s/s of infection. Access cleaned with alcohol and cannulated with 15gx2 w/o difficulty. Needles secured with tape. VSS, tx initiated. Labs     Obtained/Reviewed   Critical Results Called   Date when labs were drawn-  Hgb-    HGB   Date Value Ref Range Status   02/20/2019 9.3 (L) 11.5 - 16.0 g/dL Final     K-    Potassium   Date Value Ref Range Status   02/20/2019 4.8 3.5 - 5.1 mmol/L Final     Ca-   Calcium   Date Value Ref Range Status   02/20/2019 8.4 (L) 8.5 - 10.1 MG/DL Final     Bun-   BUN   Date Value Ref Range Status   02/20/2019 46 (H) 6 - 20 MG/DL Final     Creat-   Creatinine   Date Value Ref Range Status   02/20/2019 8.55 (H) 0.55 - 1.02 MG/DL Final        Medications/ Blood Products Given     Name   Dose   Route and Time     None ordered                Blood Volume Processed (BVP):     Net Fluid   Removed:  3000mL   Comments   All dialysis related medications have been reviewed. Assessment performed by RN.   Procedure and documentation observed and reviewed by Magda Munoz, RN.  Time Out Done: 0394  Primary Nurse Rpt Pre:  Lou Velásquez RN  Primary Nurse Rpt Post:  Lou Velásquez RN  Pt Education:  procedural  Care Plan:  On going  Tx Summary:  5349:  SBAR received from Primary RN. Pt arrived to HD suite A&Ox4, denies complaints. LLE AVF, + b/t; no s/s of infection. Access cleaned with alcohol and cannulated with 15gx2 w/o difficulty. Needles secured with tape. VSS, tx initiated. 1245:  Pt resting  1300:  Pt requested to remove 3000mL, Dr Winston Citigalileo notified via telephone call. Dr Aniceto Ye stated she was fine with that as long as BP could tolerate it. 1315:  Pt resting  1330:  Pt resting  1345:  Pt resting  1400:  Pt resting  1415:  Pt resting  1430:  Pt resting  1445:  Pt resting  1500:  Pt resting  1515:  Dr Catrachito Dawkins at bedside; pt alert  :  Pt resting  1545:  Pt resting  1600: Tx terminated. All possible blood returned to pt w/o difficulty. Needles pulled, manual pressure held until hemostasis was achieved. Cannulation sites covered with gauze and secured with tape. SBAR called to Primary RN. VSS, pt returned to room. Admiting Diagnosis:  Cardiac Cath  Pt's previous clinic- [facility]  Consent signed - Informed Consent Verified: Yes (02/20/19 1230)  [facility] Consent - signed and on file  Hepatitis Status- pending  Machine #- Machine Number: [id number] (02/20/19 1230)  Telemetry status- NSR  Pre-dialysis wt. -

## 2019-02-20 NOTE — PROGRESS NOTES
[address] [place] [address]  [phone] Cardiology Progress Note 2/20/2019 8:30AM 
 
Admit Date: 2/19/2019 Admit Diagnosis:  
ACS (acute coronary syndrome) ([address] [I24.9] Subjective:  
 
Rissa Sykes has no complaints s/p successful /SAL to RCA. Awaiting HD this am, then discharge to home. Visit Vitals /58 (BP 1 Location: Right arm, BP Patient Position: At rest) Pulse 75 Temp 97.8 °F (36.6 °C) Resp 18 Ht 5' 10\" (1.778 m) Wt 137.4 kg (303 lb) SpO2 98% Breastfeeding? No  
BMI 43.48 kg/m² Current Facility-Administered Medications Medication Dose Route Frequency  albuterol (PROVENTIL HFA, VENTOLIN HFA, PROAIR HFA) inhaler 2 Puff  2 Puff Inhalation Q4H PRN  
 allopurinol (ZYLOPRIM) tablet 100 mg  100 mg Oral DAILY  amLODIPine (NORVASC) tablet 5 mg  5 mg Oral DAILY  aspirin delayed-release tablet 81 mg  81 mg Oral DAILY  atorvastatin (LIPITOR) tablet 80 mg  80 mg Oral QHS  carvedilol (COREG) tablet 37.5 mg  37.5 mg Oral BID WITH MEALS  isosorbide mononitrate ER (IMDUR) tablet 120 mg  120 mg Oral DAILY  oxyCODONE-acetaminophen (PERCOCET 7.5) 7.5-325 mg per tablet 1 Tab  1 Tab Oral BID PRN  pantoprazole (PROTONIX) tablet 40 mg  40 mg Oral ACB  sodium chloride (NS) flush 5-40 mL  5-40 mL IntraVENous Q8H  
 sodium chloride (NS) flush 5-40 mL  5-40 mL IntraVENous PRN  
 acetaminophen (TYLENOL) tablet 650 mg  650 mg Oral Q4H PRN  
 insulin lispro (HUMALOG) injection   SubCUTAneous AC&HS  
 glucose chewable tablet 16 g  4 Tab Oral PRN  
 dextrose (D50W) injection syrg 12.5-25 g  12.5-25 g IntraVENous PRN  
 glucagon (GLUCAGEN) injection 1 mg  1 mg IntraMUSCular PRN  
 clopidogrel (PLAVIX) tablet 75 mg  75 mg Oral DAILY Objective:  
  
Physical Exam: 
General Appearance:  morbidly obese AAF in no acute distress Chest:   Clear Cardiovascular:  Regular rate and rhythm, no murmur.  
 Abdomen:   Soft, non-tender, bowel sounds are active.  
Extremities: right groin  Site D/I, no hematoma Skin:  Warm and dry.  
 
Data Review:  
Recent Labs  
  02/20/19 
1663 WBC 14.5* HGB 9.3* HCT 30.1*  
 Recent Labs  
  02/20/19 
1930 02/19/19 
1138 02/18/19 
1610   --   --   
K 4.8  --   --   
  --   --   
CO2 26  --   --   
*  --   --   
BUN 46*  --   --   
CREA 8.55*  --   --   
CA 8.4*  --   --   
PHOS 3.7  --   --   
ALB 2.6*  --   --   
INR  --  1.5* 1.7 No results for input(s): TROIQ, CPK, CKMB in the last 72 hours. No intake or output data in the 24 hours ending 02/20/19 1144 Telemetry: SR  
 
 
Assessment:  
 
Active Problems: S/P cardiac cath (2/19/2019) Overview: 2/19/19:  Successful  of RCA Plan:  
 
Angina class III: 
Successful  of RCA with SAL placement Started on Plavix 75mg daily x 1 year, continue on BB, Lipitor. CKD with ESRD on HD: 
Stable, HD today before discharge F/u with Dr. Truman Levi in 2 weeks. Rita December [facility] Cardiology

## 2019-02-20 NOTE — PROGRESS NOTES
Nephrology Progress Note Manjit Roblero  
 
www. [id number]                  Phone - [phone] Patient: Dao Acosta Date- 2/20/2019 Admit Date: 2/19/2019 YOB: 1955 CC: Follow up for esrd Subjective: Interval History:  
-   is aa female with pmh of esrd. She Is on hd at [facility]. On mwf She had cardiac cath yesterday The patient is seen on dialysis. She is feeling well and expects to go home today after dialysis. ROS: no HEENT complaints. No fever/chills. No SOB or cough. No chest pain. + chronic palpitations. + chronic arthritic pain. +abd discomfort (not new). No N/V Assessment: 
· esrd mwf · Anemia of ckd · htn · S/p cardiac cath · Sec. Orin Reis Plan: ·  
· Dialysis now. · Continue norvasc, coreg Stable for discharge from the renal standpoint. D/w the pt. Physical Exam:  
GEN: obese woman in no distress NECK- Supple, no mass RESP: Clear b/l, no wheezing, No accessory muscle use CVS: RRR; no gallop or rub ABDO: soft , non tender, No mass EXT: No Edema NEURO: normal speech, non focal 
Left arm avf + Care Plan discussed with: patient Objective:  
Patient Vitals for the past 24 hrs: 
 Temp Pulse Resp BP SpO2  
02/20/19 1515  61 18 120/85   
02/20/19 1500  (!) 57 18 115/70   
02/20/19 1445  (!) 59  130/65   
02/20/19 1430  63 18 136/70   
02/20/19 1415  60 18 142/79   
02/20/19 1400  (!) 56 (!) 178 125/82   
02/20/19 1345  62 18 149/73   
02/20/19 1330  60 18 129/81   
02/20/19 1315  63 18 121/66   
02/20/19 1300  64 18 111/72   
02/20/19 1245  65 18 118/65   
02/20/19 1230 97.6 °F (36.4 °C) 70 18 126/68   
02/20/19 1104 97.8 °F (36.6 °C) 75 18 114/58 98 % 02/20/19 0726 97.1 °F (36.2 °C) 83 18 120/50 99 % 02/20/19 0416 97.5 °F (36.4 °C) 71 18 109/50 94 % 02/20/19 0012 97.7 °F (36.5 °C) 69 18 106/49 97 % 02/19/19 2100  82 20 138/73 95 % 02/19/19 1901 97.5 °F (36.4 °C) 78 20 128/73 97 % 02/19/19 1800  88  142/71 98 % 02/19/19 1700  71  145/79 100 % 02/19/19 1630  87   99 % 02/19/19 1600  73  149/69 95 % 02/19/19 1545  74  148/64 92 % 02/19/19 1530  65  148/71 99 % Last 3 Recorded Weights in this Encounter 02/19/19 1137 Weight: 137.4 kg (303 lb) No intake/output data recorded. Chart reviewed. Pertinent Notes reviewed. Medication list  reviewed Current Facility-Administered Medications Medication  albuterol (PROVENTIL HFA, VENTOLIN HFA, PROAIR HFA) inhaler 2 Puff  allopurinol (ZYLOPRIM) tablet 100 mg  
 amLODIPine (NORVASC) tablet 5 mg  aspirin delayed-release tablet 81 mg  
 atorvastatin (LIPITOR) tablet 80 mg  carvedilol (COREG) tablet 37.5 mg  
 isosorbide mononitrate ER (IMDUR) tablet 120 mg  
 oxyCODONE-acetaminophen (PERCOCET 7.5) 7.5-325 mg per tablet 1 Tab  pantoprazole (PROTONIX) tablet 40 mg  
 sodium chloride (NS) flush 5-40 mL  sodium chloride (NS) flush 5-40 mL  acetaminophen (TYLENOL) tablet 650 mg  
 insulin lispro (HUMALOG) injection  glucose chewable tablet 16 g  
 dextrose (D50W) injection syrg 12.5-25 g  
 glucagon (GLUCAGEN) injection 1 mg  clopidogrel (PLAVIX) tablet 75 mg Data Review : 
Recent Labs  
  02/20/19 
1944 02/19/19 
1138 02/18/19 
1610 WBC 14.5*  --   --   
HGB 9.3*  --   --   
  --   --   
INR  --  1.5* 1.7   --   --   
K 4.8  --   --   
*  --   --   
BUN 46*  --   --   
CREA 8.55*  --   --   
CA 8.4*  --   --   
PHOS 3.7  --   --   
 
Lab Results Component Value Date/Time Culture result: NO GROWTH 6 DAYS 02/28/2017 04:26 PM  
 Culture result: HEAVY NORMAL RESPIRATORY WAGNER 
 02/03/2017 04:10 PM  
 Culture result: MODERATE CANDIDA TROPICALIS 
 (A) 02/03/2017 04:10 PM  
 Culture result:  02/03/2017 04:10 PM  
 CULTURE WILL BE HELD FOR 4 WEEKS. IF THERE IS ADDITIONAL FUNGAL GROWTH, A NEW REPORT WILL FOLLOW. Lab Results Component Value Date/Time Specimen Description: BLOOD 10/14/2013 03:37 AM  
 Specimen Description: BLOOD 10/11/2013 12:03 AM  
 Specimen Description: NARES 07/12/2013 04:50 AM  
 
No results for input(s): FE, TIBC, PSAT, FERR in the last 72 hours. Lab Results Component Value Date/Time Sodium,urine random 25 11/10/2014 12:40 PM  
 Creatinine, urine 145.29 03/04/2017 05:01 AM  
 
Rito Clarke MD 
[facility] 
 www. [facility] [employer] [facility], [address] [place], [address] Phone - [phone] Fax - [phone]

## 2019-02-21 ENCOUNTER — APPOINTMENT (OUTPATIENT)
Dept: GENERAL RADIOLOGY | Age: 64
DRG: 205 | End: 2019-02-21
Attending: EMERGENCY MEDICINE
Payer: MEDICARE

## 2019-02-21 ENCOUNTER — HOSPITAL ENCOUNTER (INPATIENT)
Age: 64
LOS: 1 days | Discharge: HOME | DRG: 205 | End: 2019-02-23
Attending: EMERGENCY MEDICINE | Admitting: FAMILY MEDICINE
Payer: MEDICARE

## 2019-02-21 DIAGNOSIS — R07.9 CHEST PAIN, UNSPECIFIED TYPE: Primary | ICD-10-CM

## 2019-02-21 DIAGNOSIS — K21.9 GASTROESOPHAGEAL REFLUX DISEASE, ESOPHAGITIS PRESENCE NOT SPECIFIED: ICD-10-CM

## 2019-02-21 LAB
ANION GAP SERPL CALC-SCNC: 8 MMOL/L (ref 5–15)
APTT PPP: 30 SEC (ref 22.1–32)
BASOPHILS # BLD: 0 K/UL (ref 0–0.1)
BASOPHILS NFR BLD: 0 % (ref 0–1)
BUN SERPL-MCNC: 43 MG/DL (ref 6–20)
BUN/CREAT SERPL: 6 (ref 12–20)
CALCIUM SERPL-MCNC: 8.3 MG/DL (ref 8.5–10.1)
CHLORIDE SERPL-SCNC: 100 MMOL/L (ref 97–108)
CO2 SERPL-SCNC: 32 MMOL/L (ref 21–32)
COMMENT, HOLDF: NORMAL
CREAT SERPL-MCNC: 6.98 MG/DL (ref 0.55–1.02)
DIFFERENTIAL METHOD BLD: ABNORMAL
EOSINOPHIL # BLD: 0.3 K/UL (ref 0–0.4)
EOSINOPHIL NFR BLD: 2 % (ref 0–7)
ERYTHROCYTE [DISTWIDTH] IN BLOOD BY AUTOMATED COUNT: 14.6 % (ref 11.5–14.5)
GLUCOSE SERPL-MCNC: 112 MG/DL (ref 65–100)
HCT VFR BLD AUTO: 29.5 % (ref 35–47)
HGB BLD-MCNC: 8.9 G/DL (ref 11.5–16)
IMM GRANULOCYTES # BLD AUTO: 0.2 K/UL (ref 0–0.04)
IMM GRANULOCYTES NFR BLD AUTO: 1 % (ref 0–0.5)
INR PPP: 1.6 (ref 0.9–1.1)
LYMPHOCYTES # BLD: 2.4 K/UL (ref 0.8–3.5)
LYMPHOCYTES NFR BLD: 20 % (ref 12–49)
MAGNESIUM SERPL-MCNC: 1.5 MG/DL (ref 1.6–2.4)
MCH RBC QN AUTO: 31.4 PG (ref 26–34)
MCHC RBC AUTO-ENTMCNC: 30.2 G/DL (ref 30–36.5)
MCV RBC AUTO: 104.2 FL (ref 80–99)
MONOCYTES # BLD: 1 K/UL (ref 0–1)
MONOCYTES NFR BLD: 9 % (ref 5–13)
NEUTS SEG # BLD: 8 K/UL (ref 1.8–8)
NEUTS SEG NFR BLD: 67 % (ref 32–75)
NRBC # BLD: 0 K/UL (ref 0–0.01)
NRBC BLD-RTO: 0 PER 100 WBC
PLATELET # BLD AUTO: 349 K/UL (ref 150–400)
PMV BLD AUTO: 9.4 FL (ref 8.9–12.9)
POTASSIUM SERPL-SCNC: 4.1 MMOL/L (ref 3.5–5.1)
PROTHROMBIN TIME: 16.3 SEC (ref 9–11.1)
RBC # BLD AUTO: 2.83 M/UL (ref 3.8–5.2)
SAMPLES BEING HELD,HOLD: NORMAL
SODIUM SERPL-SCNC: 140 MMOL/L (ref 136–145)
THERAPEUTIC RANGE,PTTT: NORMAL SECS (ref 58–77)
TROPONIN I BLD-MCNC: <0.04 NG/ML (ref 0–0.08)
WBC # BLD AUTO: 11.8 K/UL (ref 3.6–11)

## 2019-02-21 PROCEDURE — 74011250636 HC RX REV CODE- 250/636: Performed by: EMERGENCY MEDICINE

## 2019-02-21 PROCEDURE — 96376 TX/PRO/DX INJ SAME DRUG ADON: CPT

## 2019-02-21 PROCEDURE — 85610 PROTHROMBIN TIME: CPT

## 2019-02-21 PROCEDURE — 71046 X-RAY EXAM CHEST 2 VIEWS: CPT

## 2019-02-21 PROCEDURE — 96374 THER/PROPH/DIAG INJ IV PUSH: CPT

## 2019-02-21 PROCEDURE — 85025 COMPLETE CBC W/AUTO DIFF WBC: CPT

## 2019-02-21 PROCEDURE — 84484 ASSAY OF TROPONIN QUANT: CPT

## 2019-02-21 PROCEDURE — 36415 COLL VENOUS BLD VENIPUNCTURE: CPT

## 2019-02-21 PROCEDURE — 85730 THROMBOPLASTIN TIME PARTIAL: CPT

## 2019-02-21 PROCEDURE — 93005 ELECTROCARDIOGRAM TRACING: CPT

## 2019-02-21 PROCEDURE — 83735 ASSAY OF MAGNESIUM: CPT

## 2019-02-21 PROCEDURE — 80048 BASIC METABOLIC PNL TOTAL CA: CPT

## 2019-02-21 PROCEDURE — 99284 EMERGENCY DEPT VISIT MOD MDM: CPT

## 2019-02-21 RX ORDER — HYDROMORPHONE HYDROCHLORIDE 2 MG/ML
1 INJECTION, SOLUTION INTRAMUSCULAR; INTRAVENOUS; SUBCUTANEOUS
Status: COMPLETED | OUTPATIENT
Start: 2019-02-21 | End: 2019-02-21

## 2019-02-21 RX ADMIN — HYDROMORPHONE HYDROCHLORIDE 1 MG: 2 INJECTION INTRAMUSCULAR; INTRAVENOUS; SUBCUTANEOUS at 22:34

## 2019-02-21 RX ADMIN — HYDROMORPHONE HYDROCHLORIDE 1 MG: 2 INJECTION INTRAMUSCULAR; INTRAVENOUS; SUBCUTANEOUS at 23:44

## 2019-02-22 ENCOUNTER — APPOINTMENT (OUTPATIENT)
Dept: CT IMAGING | Age: 64
DRG: 205 | End: 2019-02-22
Attending: FAMILY MEDICINE
Payer: MEDICARE

## 2019-02-22 ENCOUNTER — APPOINTMENT (OUTPATIENT)
Dept: NUCLEAR MEDICINE | Age: 64
DRG: 205 | End: 2019-02-22
Attending: FAMILY MEDICINE
Payer: MEDICARE

## 2019-02-22 ENCOUNTER — TELEPHONE (OUTPATIENT)
Dept: FAMILY MEDICINE CLINIC | Age: 64
End: 2019-02-22

## 2019-02-22 ENCOUNTER — PATIENT OUTREACH (OUTPATIENT)
Dept: FAMILY MEDICINE CLINIC | Age: 64
End: 2019-02-22

## 2019-02-22 ENCOUNTER — APPOINTMENT (OUTPATIENT)
Dept: NON INVASIVE DIAGNOSTICS | Age: 64
DRG: 205 | End: 2019-02-22
Attending: STUDENT IN AN ORGANIZED HEALTH CARE EDUCATION/TRAINING PROGRAM
Payer: MEDICARE

## 2019-02-22 PROBLEM — R07.9 CHEST PAIN: Status: ACTIVE | Noted: 2019-02-22

## 2019-02-22 LAB
ALBUMIN SERPL-MCNC: 2.4 G/DL (ref 3.5–5)
ALBUMIN/GLOB SERPL: 0.6 {RATIO} (ref 1.1–2.2)
ALP SERPL-CCNC: 101 U/L (ref 45–117)
ALT SERPL-CCNC: 12 U/L (ref 12–78)
ANION GAP SERPL CALC-SCNC: 9 MMOL/L (ref 5–15)
AST SERPL-CCNC: 11 U/L (ref 15–37)
ATRIAL RATE: 74 BPM
BASOPHILS # BLD: 0 K/UL (ref 0–0.1)
BASOPHILS NFR BLD: 0 % (ref 0–1)
BILIRUB SERPL-MCNC: 0.4 MG/DL (ref 0.2–1)
BUN SERPL-MCNC: 44 MG/DL (ref 6–20)
BUN/CREAT SERPL: 6 (ref 12–20)
CALCIUM SERPL-MCNC: 8.1 MG/DL (ref 8.5–10.1)
CALCULATED P AXIS, ECG09: 43 DEGREES
CALCULATED R AXIS, ECG10: -2 DEGREES
CALCULATED T AXIS, ECG11: 43 DEGREES
CHLORIDE SERPL-SCNC: 101 MMOL/L (ref 97–108)
CO2 SERPL-SCNC: 31 MMOL/L (ref 21–32)
CREAT SERPL-MCNC: 7.19 MG/DL (ref 0.55–1.02)
DIAGNOSIS, 93000: NORMAL
DIFFERENTIAL METHOD BLD: ABNORMAL
ECHO AO ROOT DIAM: 3.19 CM
ECHO AV AREA PEAK VELOCITY: 2.1 CM2
ECHO AV PEAK GRADIENT: 9 MMHG
ECHO AV PEAK VELOCITY: 149.99 CM/S
ECHO LA MAJOR AXIS: 3.39 CM
ECHO LA TO AORTIC ROOT RATIO: 1.06
ECHO LA VOL 2C: 53.87 ML (ref 22–52)
ECHO LA VOL 4C: 38.06 ML (ref 22–52)
ECHO LA VOL BP: 51.36 ML (ref 22–52)
ECHO LA VOL/BSA BIPLANE: 20.62 ML/M2 (ref 16–28)
ECHO LA VOLUME INDEX A2C: 21.63 ML/M2 (ref 16–28)
ECHO LA VOLUME INDEX A4C: 15.28 ML/M2 (ref 16–28)
ECHO LV INTERNAL DIMENSION DIASTOLIC: 4.41 CM (ref 3.9–5.3)
ECHO LV INTERNAL DIMENSION SYSTOLIC: 2.93 CM
ECHO LV IVSD: 0.81 CM (ref 0.6–0.9)
ECHO LV MASS 2D: 120.5 G (ref 67–162)
ECHO LV MASS INDEX 2D: 48.4 G/M2 (ref 43–95)
ECHO LV POSTERIOR WALL DIASTOLIC: 0.77 CM (ref 0.6–0.9)
ECHO LVOT DIAM: 1.88 CM
ECHO LVOT PEAK GRADIENT: 5 MMHG
ECHO LVOT PEAK VELOCITY: 112.12 CM/S
ECHO MV A VELOCITY: 99.09 CM/S
ECHO MV E DECELERATION TIME (DT): 268.9 MS
ECHO MV E VELOCITY: 1.13 CM/S
ECHO MV E/A RATIO: 0.01
ECHO MV REGURGITANT PEAK GRADIENT: 31 MMHG
ECHO MV REGURGITANT PEAK VELOCITY: 278.4 CM/S
ECHO PV MAX VELOCITY: 108.55 CM/S
ECHO PV PEAK GRADIENT: 4.7 MMHG
ECHO RV INTERNAL DIMENSION: 3.03 CM
ECHO TV REGURGITANT MAX VELOCITY: 255.28 CM/S
ECHO TV REGURGITANT PEAK GRADIENT: 26.1 MMHG
EOSINOPHIL # BLD: 0.3 K/UL (ref 0–0.4)
EOSINOPHIL NFR BLD: 3 % (ref 0–7)
ERYTHROCYTE [DISTWIDTH] IN BLOOD BY AUTOMATED COUNT: 14.7 % (ref 11.5–14.5)
GLOBULIN SER CALC-MCNC: 4.2 G/DL (ref 2–4)
GLUCOSE BLD STRIP.AUTO-MCNC: 110 MG/DL (ref 65–100)
GLUCOSE BLD STRIP.AUTO-MCNC: 117 MG/DL (ref 65–100)
GLUCOSE BLD STRIP.AUTO-MCNC: 353 MG/DL (ref 65–100)
GLUCOSE SERPL-MCNC: 94 MG/DL (ref 65–100)
HCT VFR BLD AUTO: 27.4 % (ref 35–47)
HGB BLD-MCNC: 8.2 G/DL (ref 11.5–16)
IMM GRANULOCYTES # BLD AUTO: 0.2 K/UL (ref 0–0.04)
IMM GRANULOCYTES NFR BLD AUTO: 2 % (ref 0–0.5)
INR PPP: 1.7 (ref 0.9–1.1)
LYMPHOCYTES # BLD: 2.7 K/UL (ref 0.8–3.5)
LYMPHOCYTES NFR BLD: 27 % (ref 12–49)
MAGNESIUM SERPL-MCNC: 1.4 MG/DL (ref 1.6–2.4)
MCH RBC QN AUTO: 31.1 PG (ref 26–34)
MCHC RBC AUTO-ENTMCNC: 29.9 G/DL (ref 30–36.5)
MCV RBC AUTO: 103.8 FL (ref 80–99)
MONOCYTES # BLD: 1 K/UL (ref 0–1)
MONOCYTES NFR BLD: 10 % (ref 5–13)
NEUTS SEG # BLD: 5.9 K/UL (ref 1.8–8)
NEUTS SEG NFR BLD: 58 % (ref 32–75)
NRBC # BLD: 0 K/UL (ref 0–0.01)
NRBC BLD-RTO: 0 PER 100 WBC
P-R INTERVAL, ECG05: 180 MS
PLATELET # BLD AUTO: 311 K/UL (ref 150–400)
PMV BLD AUTO: 9.4 FL (ref 8.9–12.9)
POTASSIUM SERPL-SCNC: 4.2 MMOL/L (ref 3.5–5.1)
PROT SERPL-MCNC: 6.6 G/DL (ref 6.4–8.2)
PROTHROMBIN TIME: 17 SEC (ref 9–11.1)
Q-T INTERVAL, ECG07: 402 MS
QRS DURATION, ECG06: 70 MS
QTC CALCULATION (BEZET), ECG08: 446 MS
RBC # BLD AUTO: 2.64 M/UL (ref 3.8–5.2)
SERVICE CMNT-IMP: ABNORMAL
SODIUM SERPL-SCNC: 141 MMOL/L (ref 136–145)
TROPONIN I SERPL-MCNC: <0.05 NG/ML
VENTRICULAR RATE, ECG03: 74 BPM
WBC # BLD AUTO: 10.1 K/UL (ref 3.6–11)

## 2019-02-22 PROCEDURE — 74011250637 HC RX REV CODE- 250/637: Performed by: STUDENT IN AN ORGANIZED HEALTH CARE EDUCATION/TRAINING PROGRAM

## 2019-02-22 PROCEDURE — 90935 HEMODIALYSIS ONE EVALUATION: CPT

## 2019-02-22 PROCEDURE — 65660000000 HC RM CCU STEPDOWN

## 2019-02-22 PROCEDURE — 84484 ASSAY OF TROPONIN QUANT: CPT

## 2019-02-22 PROCEDURE — 74011250637 HC RX REV CODE- 250/637: Performed by: INTERNAL MEDICINE

## 2019-02-22 PROCEDURE — 82962 GLUCOSE BLOOD TEST: CPT

## 2019-02-22 PROCEDURE — 85025 COMPLETE CBC W/AUTO DIFF WBC: CPT

## 2019-02-22 PROCEDURE — 74011250636 HC RX REV CODE- 250/636: Performed by: FAMILY MEDICINE

## 2019-02-22 PROCEDURE — 80053 COMPREHEN METABOLIC PANEL: CPT

## 2019-02-22 PROCEDURE — 74011250636 HC RX REV CODE- 250/636: Performed by: INTERNAL MEDICINE

## 2019-02-22 PROCEDURE — 74011000250 HC RX REV CODE- 250: Performed by: INTERNAL MEDICINE

## 2019-02-22 PROCEDURE — 74011250636 HC RX REV CODE- 250/636: Performed by: STUDENT IN AN ORGANIZED HEALTH CARE EDUCATION/TRAINING PROGRAM

## 2019-02-22 PROCEDURE — 83735 ASSAY OF MAGNESIUM: CPT

## 2019-02-22 PROCEDURE — 5A1D70Z PERFORMANCE OF URINARY FILTRATION, INTERMITTENT, LESS THAN 6 HOURS PER DAY: ICD-10-PCS | Performed by: FAMILY MEDICINE

## 2019-02-22 PROCEDURE — 74011250636 HC RX REV CODE- 250/636

## 2019-02-22 PROCEDURE — 74011636637 HC RX REV CODE- 636/637: Performed by: FAMILY MEDICINE

## 2019-02-22 PROCEDURE — 36415 COLL VENOUS BLD VENIPUNCTURE: CPT

## 2019-02-22 PROCEDURE — C8929 TTE W OR WO FOL WCON,DOPPLER: HCPCS

## 2019-02-22 PROCEDURE — A9540 TC99M MAA: HCPCS

## 2019-02-22 PROCEDURE — 85610 PROTHROMBIN TIME: CPT

## 2019-02-22 RX ORDER — ALBUTEROL SULFATE 0.83 MG/ML
2.5 SOLUTION RESPIRATORY (INHALATION)
Status: DISCONTINUED | OUTPATIENT
Start: 2019-02-22 | End: 2019-02-23 | Stop reason: HOSPADM

## 2019-02-22 RX ORDER — CLOPIDOGREL BISULFATE 75 MG/1
75 TABLET ORAL DAILY
Status: DISCONTINUED | OUTPATIENT
Start: 2019-02-22 | End: 2019-02-23 | Stop reason: HOSPADM

## 2019-02-22 RX ORDER — SODIUM CHLORIDE 9 MG/ML
75 INJECTION, SOLUTION INTRAVENOUS CONTINUOUS
Status: DISCONTINUED | OUTPATIENT
Start: 2019-02-22 | End: 2019-02-22

## 2019-02-22 RX ORDER — AMLODIPINE BESYLATE 10 MG/1
10 TABLET ORAL
COMMUNITY
End: 2019-12-11

## 2019-02-22 RX ORDER — AMLODIPINE BESYLATE 5 MG/1
5 TABLET ORAL DAILY
Status: DISCONTINUED | OUTPATIENT
Start: 2019-02-22 | End: 2019-02-23 | Stop reason: HOSPADM

## 2019-02-22 RX ORDER — DIPHENHYDRAMINE HYDROCHLORIDE 50 MG/ML
50 INJECTION, SOLUTION INTRAMUSCULAR; INTRAVENOUS ONCE
Status: COMPLETED | OUTPATIENT
Start: 2019-02-22 | End: 2019-02-22

## 2019-02-22 RX ORDER — ONDANSETRON 2 MG/ML
INJECTION INTRAMUSCULAR; INTRAVENOUS
Status: COMPLETED
Start: 2019-02-22 | End: 2019-02-22

## 2019-02-22 RX ORDER — HYDROMORPHONE HYDROCHLORIDE 2 MG/ML
1 INJECTION, SOLUTION INTRAMUSCULAR; INTRAVENOUS; SUBCUTANEOUS ONCE
Status: COMPLETED | OUTPATIENT
Start: 2019-02-22 | End: 2019-02-22

## 2019-02-22 RX ORDER — SODIUM CHLORIDE 0.9 % (FLUSH) 0.9 %
5-40 SYRINGE (ML) INJECTION EVERY 8 HOURS
Status: DISCONTINUED | OUTPATIENT
Start: 2019-02-22 | End: 2019-02-23 | Stop reason: HOSPADM

## 2019-02-22 RX ORDER — ONDANSETRON 2 MG/ML
4 INJECTION INTRAMUSCULAR; INTRAVENOUS
Status: COMPLETED | OUTPATIENT
Start: 2019-02-22 | End: 2019-02-22

## 2019-02-22 RX ORDER — DOXERCALCIFEROL 4 UG/2ML
1 INJECTION INTRAVENOUS
Status: DISCONTINUED | OUTPATIENT
Start: 2019-02-22 | End: 2019-02-23 | Stop reason: HOSPADM

## 2019-02-22 RX ORDER — WARFARIN 3 MG/1
3 TABLET ORAL
COMMUNITY
End: 2019-02-28

## 2019-02-22 RX ORDER — MAGNESIUM SULFATE 100 %
4 CRYSTALS MISCELLANEOUS AS NEEDED
Status: DISCONTINUED | OUTPATIENT
Start: 2019-02-22 | End: 2019-02-23 | Stop reason: HOSPADM

## 2019-02-22 RX ORDER — ISOSORBIDE MONONITRATE 60 MG/1
120 TABLET, EXTENDED RELEASE ORAL DAILY
Status: DISCONTINUED | OUTPATIENT
Start: 2019-02-22 | End: 2019-02-23 | Stop reason: HOSPADM

## 2019-02-22 RX ORDER — PANTOPRAZOLE SODIUM 40 MG/1
40 TABLET, DELAYED RELEASE ORAL
Status: DISCONTINUED | OUTPATIENT
Start: 2019-02-22 | End: 2019-02-23 | Stop reason: HOSPADM

## 2019-02-22 RX ORDER — PANTOPRAZOLE SODIUM 40 MG/1
40 TABLET, DELAYED RELEASE ORAL
Status: DISCONTINUED | OUTPATIENT
Start: 2019-02-22 | End: 2019-02-22

## 2019-02-22 RX ORDER — OXYCODONE AND ACETAMINOPHEN 7.5; 325 MG/1; MG/1
1 TABLET ORAL
Status: DISCONTINUED | OUTPATIENT
Start: 2019-02-22 | End: 2019-02-23 | Stop reason: HOSPADM

## 2019-02-22 RX ORDER — ATORVASTATIN CALCIUM 20 MG/1
80 TABLET, FILM COATED ORAL
Status: DISCONTINUED | OUTPATIENT
Start: 2019-02-22 | End: 2019-02-23 | Stop reason: HOSPADM

## 2019-02-22 RX ORDER — CARVEDILOL 12.5 MG/1
37.5 TABLET ORAL 2 TIMES DAILY WITH MEALS
Status: DISCONTINUED | OUTPATIENT
Start: 2019-02-22 | End: 2019-02-23 | Stop reason: HOSPADM

## 2019-02-22 RX ORDER — INSULIN LISPRO 100 [IU]/ML
INJECTION, SOLUTION INTRAVENOUS; SUBCUTANEOUS
Status: DISCONTINUED | OUTPATIENT
Start: 2019-02-22 | End: 2019-02-22

## 2019-02-22 RX ORDER — PANTOPRAZOLE SODIUM 40 MG/1
TABLET, DELAYED RELEASE ORAL
Qty: 90 TAB | Refills: 3 | Status: SHIPPED | OUTPATIENT
Start: 2019-02-22 | End: 2019-02-23 | Stop reason: SDUPTHER

## 2019-02-22 RX ORDER — DEXTROSE 50 % IN WATER (D50W) INTRAVENOUS SYRINGE
25-50 AS NEEDED
Status: DISCONTINUED | OUTPATIENT
Start: 2019-02-22 | End: 2019-02-23 | Stop reason: HOSPADM

## 2019-02-22 RX ORDER — SODIUM CHLORIDE 0.9 % (FLUSH) 0.9 %
5-40 SYRINGE (ML) INJECTION AS NEEDED
Status: DISCONTINUED | OUTPATIENT
Start: 2019-02-22 | End: 2019-02-23 | Stop reason: HOSPADM

## 2019-02-22 RX ORDER — INSULIN LISPRO 100 [IU]/ML
INJECTION, SOLUTION INTRAVENOUS; SUBCUTANEOUS EVERY 6 HOURS
Status: DISCONTINUED | OUTPATIENT
Start: 2019-02-22 | End: 2019-02-23

## 2019-02-22 RX ORDER — ALLOPURINOL 100 MG/1
100 TABLET ORAL DAILY
Status: DISCONTINUED | OUTPATIENT
Start: 2019-02-22 | End: 2019-02-23 | Stop reason: HOSPADM

## 2019-02-22 RX ORDER — HEPARIN SODIUM 5000 [USP'U]/ML
5000 INJECTION, SOLUTION INTRAVENOUS; SUBCUTANEOUS ONCE
Status: COMPLETED | OUTPATIENT
Start: 2019-02-22 | End: 2019-02-22

## 2019-02-22 RX ADMIN — ERYTHROPOIETIN 10000 UNITS: 10000 INJECTION, SOLUTION INTRAVENOUS; SUBCUTANEOUS at 21:47

## 2019-02-22 RX ADMIN — ISOSORBIDE MONONITRATE 120 MG: 60 TABLET ORAL at 08:00

## 2019-02-22 RX ADMIN — DOXERCALCIFEROL 1 MCG: 2 INJECTION, SOLUTION INTRAVENOUS at 21:46

## 2019-02-22 RX ADMIN — ATORVASTATIN CALCIUM 80 MG: 20 TABLET, FILM COATED ORAL at 21:51

## 2019-02-22 RX ADMIN — DIPHENHYDRAMINE HYDROCHLORIDE 50 MG: 50 INJECTION, SOLUTION INTRAMUSCULAR; INTRAVENOUS at 09:14

## 2019-02-22 RX ADMIN — CARVEDILOL 37.5 MG: 12.5 TABLET, FILM COATED ORAL at 08:00

## 2019-02-22 RX ADMIN — ONDANSETRON 4 MG: 2 INJECTION INTRAMUSCULAR; INTRAVENOUS at 04:52

## 2019-02-22 RX ADMIN — CARVEDILOL 37.5 MG: 12.5 TABLET, FILM COATED ORAL at 01:34

## 2019-02-22 RX ADMIN — ATORVASTATIN CALCIUM 80 MG: 20 TABLET, FILM COATED ORAL at 01:34

## 2019-02-22 RX ADMIN — METHYLPREDNISOLONE SODIUM SUCCINATE 40 MG: 125 INJECTION, POWDER, FOR SOLUTION INTRAMUSCULAR; INTRAVENOUS at 09:14

## 2019-02-22 RX ADMIN — WARFARIN SODIUM 3 MG: 2 TABLET ORAL at 17:16

## 2019-02-22 RX ADMIN — OXYCODONE HYDROCHLORIDE AND ACETAMINOPHEN 1 TABLET: 7.5; 325 TABLET ORAL at 08:01

## 2019-02-22 RX ADMIN — SODIUM CHLORIDE 75 ML/HR: 900 INJECTION, SOLUTION INTRAVENOUS at 01:34

## 2019-02-22 RX ADMIN — OXYCODONE HYDROCHLORIDE AND ACETAMINOPHEN 1 TABLET: 7.5; 325 TABLET ORAL at 02:03

## 2019-02-22 RX ADMIN — PANTOPRAZOLE SODIUM 40 MG: 40 TABLET, DELAYED RELEASE ORAL at 08:01

## 2019-02-22 RX ADMIN — CLOPIDOGREL 75 MG: 75 TABLET, FILM COATED ORAL at 11:51

## 2019-02-22 RX ADMIN — PERFLUTREN 2 ML: 6.52 INJECTION, SUSPENSION INTRAVENOUS at 14:36

## 2019-02-22 RX ADMIN — HEPARIN SODIUM 5000 UNITS: 5000 INJECTION INTRAVENOUS; SUBCUTANEOUS at 01:34

## 2019-02-22 RX ADMIN — Medication 10 ML: at 21:52

## 2019-02-22 RX ADMIN — Medication 10 ML: at 02:04

## 2019-02-22 RX ADMIN — AMLODIPINE BESYLATE 5 MG: 5 TABLET ORAL at 08:01

## 2019-02-22 RX ADMIN — PANTOPRAZOLE SODIUM 40 MG: 40 TABLET, DELAYED RELEASE ORAL at 16:10

## 2019-02-22 RX ADMIN — INSULIN LISPRO 8 UNITS: 100 INJECTION, SOLUTION INTRAVENOUS; SUBCUTANEOUS at 17:25

## 2019-02-22 RX ADMIN — OXYCODONE HYDROCHLORIDE AND ACETAMINOPHEN 1 TABLET: 7.5; 325 TABLET ORAL at 16:10

## 2019-02-22 RX ADMIN — LIDOCAINE HYDROCHLORIDE 40 ML: 20 SOLUTION ORAL; TOPICAL at 11:51

## 2019-02-22 RX ADMIN — HYDROMORPHONE HYDROCHLORIDE 1 MG: 2 INJECTION INTRAMUSCULAR; INTRAVENOUS; SUBCUTANEOUS at 04:46

## 2019-02-22 RX ADMIN — CARVEDILOL 37.5 MG: 12.5 TABLET, FILM COATED ORAL at 16:10

## 2019-02-22 RX ADMIN — ALLOPURINOL 100 MG: 100 TABLET ORAL at 08:00

## 2019-02-22 NOTE — PROGRESS NOTES
[address] [facility]  
Senior Resident Admission Note CC: \"Chest Pain\" HPI: 
Melvi Martinez is a 66 y.o. female who presents to the ER complaining of 1 day Hx of 12/10 chest pain in setting of recent stent placement on Tuesday. Chart reviewed. Patient seen, examined, and discussed with Dr. Beryl Cunningham (PGY-1). See his H&P note for more details. 2/22/2019 Vital Signs Blood pressure 108/52, pulse 66, temperature 98.3 °F (36.8 °C), resp. rate 13, height 5' 10\" (1.778 m), weight 303 lb (137.4 kg), SpO2 98 %. Physical Exam: 
 
Gen: Pt in NAD 
CV: Heart RRR, no m/r/t. Sternal chest pain is reproducible to palpation Resp: Lungs CTAB Ext:  No pitting edema, chronic right calf pain Recent Labs  
  02/21/19 
2225 02/20/19 
9151 02/19/19 
1138 WBC 11.8* 14.5*  --   
HGB 8.9* 9.3*  --   
HCT 29.5* 30.1*  --   
 356  --   
 138  --   
K 4.1 4.8  --   
 102  --   
CO2 32 26  --   
* 204*  --   
BUN 43* 46*  --   
CREA 6.98* 8.55*  --   
CA 8.3* 8.4*  --   
MG 1.5*  --   --   
PHOS  --  3.7  --   
ALB  --  2.6*  --   
INR 1.6*  --  1.5* Imaging Xr Chest Pa Lat Result Date: 2/21/2019 IMPRESSION: Chronic bilateral opacities. Resolution of pulmonary edema. No pneumothorax. Old granulomatous disease. A/P: Pt is a 66 y.o. female who presented to ED with chest pain in setting of recent stent placement. Differential includes ACS ruleout vs pain from stent placement vs clotting of stent vs PE in setting of chest pain and subtherapeutic INR 1. Chest Pain- Trop neg x 1, EKG WNL. Tredn trops, consult cards, NPO. Cont home imdur. Admit to tele 2. Dyspnea on exertion- Chronic, not hypoxic, given subtherapeutic INR and ches tpain, will get V/Q scan 3. Right calf pain- Chronic, no increase in pain, will get dopplers to rule out new acute DVTs 4.  Diabetes-On Novolog 70/30 at home but takes it as sliding scale, will start SSI, and dose Lantus tonight based on SSI usage 5. COPD-Stable, albuetrol PRN, on her home 2L NC O2 
6. Diastolic HF-Stable, gets dialysis 7. ESRD on HD (MWF)- C/s nephro, due for dialysis today 8. Hx DVT-On coumadin 3mg every day, except Friday. Subtherapeutic due to holding coumadin for 1 week prior to stent placement. Will hold coumadin until cards sees patient 9. HTN-Cont home amlodipine, coreg I agree with remaining assessment and plan as documented in Dr. Zak Muro note. Pt discussed with Dr. Britta Fletcher (on-call attending physician) Margret Arias MD 
Family Medicine Resident

## 2019-02-22 NOTE — ED TRIAGE NOTES
Pt c/o chest tightness since this morning. Progressingly getting worse thruout day. Hx of heart attack and blood clots. C/o SOB also

## 2019-02-22 NOTE — ED PROVIDER NOTES
66 y.o. female with past medical history significant for MI, DM, DKA, CAD, ovarian cancer, aortic aneurysm, COPD, obesity, diastolic CHF, CKD, who presents ambulatory with  to the ED with cc of chest pain. Pt reports she woke with chest tightness today s/p scheduled stent placement on 2/19/19 at [address] [facility] by Dr. Levy Boswell. She states the tightness worsened throughout the day and she also started with chest pain. She states she took nitroglycerin without improvement. At present, she endorses 10/10 pleuritic mid-sternal chest pain and continued tightness. She reports associated intermittent shortness of breath, nausea, and \"I get hot and cold. \" She notes use of continuous 2L O2 at home. She states she has been on Coumadin for 2 years for previous DVT, but was off of it for 7 days in preparation for the operation and started again yesterday. Of note, pt has history of asymptomatic \"silent\" MI in 2012. She adds she is a dialysis pt last dialyzed yesterday fully. Pt specifically denies any leg swelling or abdominal pain. There are no other acute medical concerns at this time. Old Chart Review: Pt was admitted at [address] [facility] at 2/19/19, had placement of drug eluting stent to R coronary. Social Hx: former Tobacco use, denies EtOH use, denies Illicit Drug use PCP: Bee Welsh DO Cardiologist: Alisson Spencer MD (primary) // Jason Strauss MD 
 
Note written by Jonathon Nieto, as dictated by Milvia Duffy MD 10:10 PM 
 
 
 
The history is provided by the patient. No  was used. Past Medical History:  
Diagnosis Date   Sleep Apnea 2/16/2011  Aortic aneurysm ([facility]  CAD (coronary Artery Disease) Native Artery 2/16/2011  CKD (chronic kidney disease) stage 4, GFR 15-29 ml/min ([facility] 2/10/2017  COPD (chronic obstructive pulmonary disease) ([facility])  Diabetic gastroparesis ([facility]  Diastolic heart failure ([facility] 10/5/2012  DM type 2 causing neurological disease ([facility]  DM type 2 causing renal disease ([facility]  DM type 2 causing vascular disease ([facility]  Esophageal stricture   
 dialted Dr. Farideh Sanchez  G6PD deficiency (HCC)   
 trait  GERD (gastroesophageal reflux disease)  Gout  ILD (interstitial lung disease) ([facility] open lung bx CJW   Morbid obesity ([facility]  OA (osteoarthritis)  Ovarian cancer ([facility] cervical and uterine  Rheumatoid arteritis  S/P left pulmonary artery pressure sensor implant placement 2017 [employer]  Tobacco use disorder 3/21/2012  Uterine cervix cancer ([facility]  Vitamin D deficiency 2013 Past Surgical History:  
Procedure Laterality Date  CARDIAC SURG PROCEDURE UNLIST    
 stents  COLONOSCOPY N/A 2016 COLONOSCOPY performed by Jesica Villarreal MD at [address] [place] [address] Drive  HX CARPAL TUNNEL RELEASE    
 bilateral  
 HX CHOLECYSTECTOMY  HX HERNIA REPAIR    
 HX HYSTERECTOMY  HX ORTHOPAEDIC  12  
 right knee replacement  HX TONSIL AND ADENOIDECTOMY  UPPER GI ENDOSCOPY,DILATN W GUIDE  2016 Family History:  
Problem Relation Age of Onset  Heart Disease Mother  Heart Disease Brother Social History Socioeconomic History  Marital status:  Spouse name: Not on file  Number of children: Not on file  Years of education: Not on file  Highest education level: Not on file Social Needs  Financial resource strain: Not on file  Food insecurity - worry: Not on file  Food insecurity - inability: Not on file  Transportation needs - medical: Not on file  Transportation needs - non-medical: Not on file Occupational History  Not on file Tobacco Use  Smoking status: Former Smoker Packs/day: 0.50 Years: 41.00 Pack years: 20.50 Types: Cigarettes Last attempt to quit: 2014 Years since quittin.2  Smokeless tobacco: Never Used Substance and Sexual Activity  Alcohol use: No  
 Drug use: No  
 Sexual activity: Not on file Other Topics Concern  Not on file Social History Narrative  Not on file ALLERGIES: Contrast dye [iodine]; Levaquin [levofloxacin]; and Morphine Review of Systems Constitutional: Negative for chills and fever. HENT: Negative for ear pain and sore throat. Eyes: Negative for pain. Respiratory: Positive for chest tightness and shortness of breath. Cardiovascular: Positive for chest pain (pleuritic, mid-sternal). Negative for leg swelling. Gastrointestinal: Positive for nausea. Negative for abdominal pain and vomiting. Genitourinary: Negative for dysuria and flank pain. Musculoskeletal: Negative for back pain. Skin: Negative for rash. Neurological: Negative for headaches. All other systems reviewed and are negative. Vitals:  
 02/21/19 2218 BP: 115/56 Pulse: 75 Resp: 18 Temp: 98.3 °F (36.8 °C) SpO2: 99% Weight: 137.4 kg (303 lb) Height: 5' 10\" (1.778 m) Physical Exam  
Constitutional: No distress. Morbidly obese HENT:  
Head: Normocephalic and atraumatic. Mouth/Throat: Oropharynx is clear and moist.  
Eyes: Conjunctivae are normal. Pupils are equal, round, and reactive to light. No scleral icterus. Neck: Neck supple. No tracheal deviation present. Cardiovascular: Normal rate, regular rhythm, normal heart sounds and intact distal pulses. Fistula LUE, radial pulses 2+ equal  
Pulmonary/Chest: Effort normal and breath sounds normal. No respiratory distress. She has no wheezes. She has no rales. Abdominal: Soft. She exhibits no distension. There is no tenderness. Genitourinary:  
Genitourinary Comments: deferred Musculoskeletal: She exhibits edema (1+ pitting BL). She exhibits no deformity. Neurological: She is alert. Skin: Skin is warm and dry. Psychiatric: She has a normal mood and affect. Nursing note and vitals reviewed. Note written by Jonathon Ureña, as dictated by Eleanor Whittington MD 10:10 PM 
 
MDM Procedures ED EKG interpretation: 
Sinus rhythm, rate 74, normal intervals, normal axis, normal ST segments. Note written by Jonathon Ureña, as dictated by Eleanor Whittington MD 10:10 PM 
 
 
11:43 PM 
Patient is being admitted to the hospital.  The results of their tests and reasons for their admission have been discussed with them and/or available family. They convey agreement and understanding for the need to be admitted and for their admission diagnosis. Consultation has been made with the inpatient physician specialist for hospitalization. LABORATORY TESTS: 
Labs Reviewed CBC WITH AUTOMATED DIFF - Abnormal; Notable for the following components:  
    Result Value WBC 11.8 (*)   
 RBC 2.83 (*) HGB 8.9 (*) HCT 29.5 (*) .2 (*)   
 RDW 14.6 (*) IMMATURE GRANULOCYTES 1 (*)   
 ABS. IMM. GRANS. 0.2 (*) All other components within normal limits MAGNESIUM - Abnormal; Notable for the following components:  
 Magnesium 1.5 (*) All other components within normal limits METABOLIC PANEL, BASIC - Abnormal; Notable for the following components:  
 Glucose 112 (*) BUN 43 (*) Creatinine 6.98 (*)   
 BUN/Creatinine ratio 6 (*)   
 GFR est AA 7 (*)   
 GFR est non-AA 6 (*) Calcium 8.3 (*) All other components within normal limits PROTHROMBIN TIME + INR - Abnormal; Notable for the following components: INR 1.6 (*) Prothrombin time 16.3 (*) All other components within normal limits PTT  
SAMPLES BEING HELD  
POC TROPONIN-I  
POC TROPONIN-I  
 
 
IMAGING RESULTS: 
Xr Chest Pa Lat Result Date: 2/21/2019 EXAM: XR CHEST PA LAT INDICATION: Chest tightness started this morning and has increased throughout the day. Intermittent shortness of breath. Diastolic heart failure, diabetes, COPD.  Chronic interstitial lung disease. COMPARISON: Portable chest views on 6/13/2018. TECHNIQUE: PA and lateral chest views FINDINGS: Cardiac monitoring wires overlie the thorax. Cardiac size and aortic arch are within normal limits. Calcified mediastinal lymph nodes are unchanged. The pulmonary vasculature is within normal limits. Bilateral heterogeneous pulmonary opacities are unchanged. No evidence of pulmonary edema. No pneumothorax. Osteopenia is unchanged. Upper abdomen is not well evaluated. IMPRESSION: Chronic bilateral opacities. Resolution of pulmonary edema. No pneumothorax. Old granulomatous disease. MEDICATIONS GIVEN: 
Medications HYDROmorphone (PF) (DILAUDID) injection 1 mg (not administered) HYDROmorphone (PF) (DILAUDID) injection 1 mg (1 mg IntraVENous Given 2/21/19 2234) IMPRESSION: 
1. Chest pain, unspecified type PLAN: 
1. Admit to family practice Total critical care time spent exclusive of procedures:  0 minutes Olivia Woodward.  Lyn Naidu MD

## 2019-02-22 NOTE — ED NOTES
Pt Throughput: Receiving [facility] Charge RN made aware of patient's bed placement. Pt downgraded to remote.  
 
Wang Donis RN

## 2019-02-22 NOTE — PROGRESS NOTES
Primary Nurse Tulio Petersen RN and Maryjane Ivan RN performed a dual skin assessment on this patient No impairment noted Randy score is 22

## 2019-02-22 NOTE — TELEPHONE ENCOUNTER
Nadya Ray calling from [facility] regarding a question about her CT scan. Please return call. Also provided the on call residents phone and pager numbers.

## 2019-02-22 NOTE — CONSULTS
Reason for Consult: Chest pain. HPI: Kirill Clifford is a 66 y.o. female with past medical history significant for CAD, heart failure preserved EF, diabetes, G6PD deficiency trait, GERD, hypertension, sleep apnea, interstitial lung disease, cardiomyopathy implant, ESRD on dialysis Monday Wednesday Friday, DVT on Coumadin therapy, recently underwent an PCI of the RCA  on February 19, 2019. She presented last night with symptoms of chest pain which she describes as having epigastric pain, sharp and as well as dull aching pain which is worse on deep inspiration and radiating to the back. The pain did not improve with nitroglycerin. She takes Percocet for her chronic pains and after taking Percocet there was some relief. She is having some nausea but no vomiting associated with the pain. There is no shortness of breath or diaphoresis associated with the shortness of breath. There is no fever, cough, dysuria. EKG at the time of presentation was negative for ischemia. The troponin is been negative. EKG personally reviewed which demonstrated normal sinus rhythm with heart rate of 74 bpm, normal axis, normal intervals, normal ST segment. CATH: 2004: 1v CAD by cath - PCI OM with SAL  CATH 5/2004 - patent stent, no new disease   Lexiscan cardiolite 12/09- normal perfusion, EF 66%  ECHO 11/2009: LVH, EF 17%, diastolic dysfunction  STRESS/LEXISCAN/CARDIOLITE 3/29/11: normal perfusion, EF 62%  CATH: 3/20/2012 :PA 55/30 mean 41, wedge 26, RA 24, EDP 35, LVG 55%, LM normal, LAD normal, LCX - OM1 patent stent, OM2 prox 85% long, % w/ L -> R collateral; s/p SAL-> OM2/OM3 with SAL. CATH: 10/4/2012: EDP 25, EF 55%, LM nl, LAD mild plaque, LCX patent OM stents, % prox with good L to R collaterals. Cath 3/18/2014 - RA 9 PA 42/19/29, PCW 12, EDP 25, no LVG due to CKD, LM nl, LAD mild plaque, LCX patent stents OM1/OM2, RCA chronic proximal occlusion with L to R collaterals.   ECHO 4/11/16: EF 60-65%. No WMA. LA mildly dilated. Lexiscan Cardiolite 4/11/16: Normal. LVEF 55%. Compared to 3/29/11, no significant changes. RHC/CardioMEMS implant 8/31/17 - RA 12, PA 56/20/30, CI ([place]te) 2.65    Echo 11/30/17 - EF 65%. G1DD. No WMA. Wall thickness was mildly to moderately increased. Limited Echo 12/13/17 - Tricuspid valve: Pulmonary artery diastolic pressure: 64.28 mmHg. [address] 6/5/18 Moderate-severe PA HTN, post capillary  Marked elevation in biV filling pressures    Event Monitor in 8/2018 demonstrated frequent PVCs    48 hour Holter monitor 2/5/19 - SB to ST 53 to 109, average HR 73. No AF/flutter. Plan:    1. Chest pain: This is atypical and more of an epigastric pain. Pain is worse on deep inspiration. EKG is negative. Troponins are negative. She had recent revascularization 3 days ago of the RCA . If this was a stent thrombosis she would have troponin and EKG positive. I do not think this is coronary related chest pain. I think this is a GI as well as costochondritis chest pain. I have asked her to increase the Protonix to 40 mg p.o. twice daily. She will need GI consultation. She sees someone at [place] which she is not recalling. Last EGD was a few years ago and she was noted to have reflux. For costochondritis she can use ice packs on chest wall. She is already taking Percocet. If the second set of troponin is negative and if the echocardiogram is normal (no pericardial effusion after  stent) then she can be discharged home. She will follow-up with Dr. Mary Rodas. 2.  CAD status post RCA stent February 19, 2019: On current in-hospital med list I do not see Plavix. The Plavix cannot be held a single day. She needs to take Plavix every day for at least 6 months. Resume Plavix 75 mg p.o. daily. She is not on aspirin because she is on Coumadin. Continue. 3.  Hypertension: Blood pressure is controlled. Continue amlodipine, Coreg, isosorbide mononitrate.   4. Dyslipidemia: Continue statins.             Past Medical History:   Diagnosis Date     Sleep Apnea 2/16/2011    Aortic aneurysm (HCC)     CAD (coronary Artery Disease) Native Artery 2/16/2011    CKD (chronic kidney disease) stage 4, GFR 15-29 ml/min ([address] 2/10/2017    COPD (chronic obstructive pulmonary disease) (HCC)     Diabetic gastroparesis (HCC)     Diastolic heart failure ([address] 10/5/2012    DM type 2 causing neurological disease ([address]     DM type 2 causing renal disease ([address]     DM type 2 causing vascular disease ([address]     Esophageal stricture 2012    dialted Dr. Marissa Martinez G6PD deficiency ([address]     trait    GERD (gastroesophageal reflux disease)     Gout     ILD (interstitial lung disease) ([address]     open lung bx CJW 2010    Morbid obesity ([address]     OA (osteoarthritis)     Ovarian cancer ([address]     cervical and uterine    Rheumatoid arteritis     S/P left pulmonary artery pressure sensor implant placement 8/31/2017    [facility]     Tobacco use disorder 3/21/2012    Uterine cervix cancer ([address]     Vitamin D deficiency 8/9/2013            Past Surgical History:   Procedure Laterality Date    CARDIAC SURG PROCEDURE UNLIST      stents    COLONOSCOPY N/A 6/24/2016    COLONOSCOPY performed by Kiya Bustamante MD at [facility] HX APPENDECTOMY      HX CARPAL TUNNEL RELEASE      bilateral    HX CHOLECYSTECTOMY      HX HERNIA REPAIR      HX HYSTERECTOMY      HX ORTHOPAEDIC  11/12/12    right knee replacement    HX TONSIL AND ADENOIDECTOMY      UPPER GI ENDOSCOPY,DILATN W GUIDE  6/24/2016                  Family History   Problem Relation Age of Onset    Heart Disease Mother     Heart Disease Brother            Social History     Socioeconomic History    Marital status:      Spouse name: Not on file    Number of children: Not on file    Years of education: Not on file    Highest education level: Not on file   Social Needs    Financial resource strain: Not on file   [facility] Food insecurity - worry: Not on file    Food insecurity - inability: Not on file    Transportation needs - medical: Not on file   Mlog needs - non-medical: Not on file   Occupational History    Not on file   Tobacco Use    Smoking status: Former Smoker     Packs/day: 0.50     Years: 41.00     Pack years: 20.50     Types: Cigarettes     Last attempt to quit: 2014     Years since quittin.2    Smokeless tobacco: Never Used   Substance and Sexual Activity    Alcohol use: No    Drug use: No    Sexual activity: Not on file   Other Topics Concern    Not on file   Social History Narrative    Not on file         Allergies   Allergen Reactions    Contrast Dye [Iodine] Anaphylaxis     Tolerates when pre medicated w/ IV solumedrol and benadryl prior to procedure     Levaquin [Levofloxacin] Nausea and Vomiting    Morphine Hives and Itching            Current Facility-Administered Medications   Medication Dose Route Frequency Provider Last Rate Last Dose    sodium chloride (NS) flush 5-40 mL  5-40 mL IntraVENous Q8H Nitin Lamas MD   10 mL at 19 0204    sodium chloride (NS) flush 5-40 mL  5-40 mL IntraVENous PRN Nitin Lamas MD        0.9% sodium chloride infusion  75 mL/hr IntraVENous CONTINUOUS Nitin Lamas MD 75 mL/hr at 19 0134 75 mL/hr at 19 0134    amLODIPine (NORVASC) tablet 5 mg  5 mg Oral DAILY Nitin Lamas MD   5 mg at 19 0801    atorvastatin (LIPITOR) tablet 80 mg  80 mg Oral QHS Nitin Lamas MD   80 mg at 19 0134    carvedilol (COREG) tablet 37.5 mg  37.5 mg Oral BID WITH MEALS Nitin Lamas MD   37.5 mg at 19 08    pantoprazole (PROTONIX) tablet 40 mg  40 mg Oral ACB Nitin Lamas MD   40 mg at 19 08    allopurinol (ZYLOPRIM) tablet 100 mg  100 mg Oral DAILY Nitin Lamas MD   100 mg at 19 0800    oxyCODONE-acetaminophen (PERCOCET 7.5) 7.5-325 mg per tablet 1 Tab  1 Tab Oral BID PRMARNIE Butler MD   1 Tab at 02/22/19 9434    isosorbide mononitrate ER (IMDUR) tablet 120 mg  120 mg Oral DAILY Donato Butler MD   120 mg at 02/22/19 0800    glucose chewable tablet 16 g  4 Tab Oral PRN Donato Butler MD        dextrose (D50W) injection syrg 12.5-25 g  25-50 mL IntraVENous PRN Donato Butler MD        glucagon (GLUCAGEN) injection 1 mg  1 mg IntraMUSCular PRN Donato Butler MD        albuterol (PROVENTIL VENTOLIN) nebulizer solution 2.5 mg  2.5 mg Nebulization Q4H PRN Donato Butler MD        insulin lispro (HUMALOG) injection   SubCUTAneous Q6H Daryle Caller, MD   Stopped at 02/22/19 6018    diphenhydrAMINE (BENADRYL) injection 50 mg  50 mg IntraVENous ONCE Elliott Wilson MD        methylPREDNISolone (PF) (Solu-MEDROL) injection 40 mg  40 mg IntraVENous ONCE Elliott Wilson MD         Current Outpatient Medications   Medication Sig Dispense Refill    amLODIPine (NORVASC) 10 mg tablet Take 10 mg by mouth daily.  warfarin (COUMADIN) 3 mg tablet Take 3 mg by mouth Every Tues, Thur & Sat.  warfarin (COUMADIN) 3 mg tablet Take 3 mg by mouth Every Mon, Wed and Sat.  clopidogrel (PLAVIX) 75 mg tab Take 1 Tab by mouth daily. 30 Tab 11    diclofenac (VOLTAREN) 1 % gel Apply 2 g to affected area four (4) times daily. As needed for right knee pain 100 g 5    oxyCODONE-acetaminophen (PERCOCET) 7.5-325 mg per tablet Take 1 Tab by mouth two (2) times daily as needed for Pain. Max Daily Amount: 2 Tabs. 60 Tab 0    insulin aspart protamine/insulin aspart (NOVOLOG MIX 70-30 U-100 INSULN) 100 unit/mL (70-30) injection 10-40 Units by SubCUTAneous route nightly as needed (BG > 160).       isosorbide mononitrate ER (IMDUR) 120 mg CR tablet TAKE 1 TABLET BY MOUTH ONCE DAILY 90 Tab 0    allopurinol (ZYLOPRIM) 100 mg tablet TAKE 1 TABLET BY MOUTH EVERY DAY 30 Tab 5    nitroglycerin (NITROSTAT) 0.3 mg SL tablet 1 Tab by SubLINGual route every five (5) minutes as needed for Chest Pain. 1 Bottle 1    carvedilol (COREG) 25 mg tablet Take 1.5 Tabs by mouth two (2) times daily (with meals). 180 Tab 3    pantoprazole (PROTONIX) 40 mg tablet TAKE 1 TABLET BY MOUTH EVERY DAY FOR HEARTBURN 90 Tab 3    albuterol (PROAIR HFA) 90 mcg/actuation inhaler Take 2 Puffs by inhalation every four (4) hours as needed for Wheezing.  ergocalciferol (VITAMIN D2) 50,000 unit capsule Take 50,000 Units by mouth every Tuesday.  atorvastatin (LIPITOR) 80 mg tablet Take 80 mg by mouth nightly. ROS:  12 point review of systems was performed. All negative except for HPI     Physical Exam:  Visit Vitals  /62 (BP 1 Location: Right arm, BP Patient Position: At rest)   Pulse 64   Temp 97.3 °F (36.3 °C)   Resp 11   Ht 5' 10\" (1.778 m)   Wt 303 lb (137.4 kg)   SpO2 97%   BMI 43.48 kg/m²       Gen:  Well-developed, well-nourished, in no acute distress  HEENT:  Pink conjunctivae, PERRL, hearing intact to voice, moist mucous membranes  Neck:  Supple, without masses, thyroid non-tender  Resp:  No accessory muscle use, clear breath sounds without wheezes rales or rhonchi. Right and left lower costochondral joints tender.   Card:  No murmurs, normal S1, S2 without thrills, bruits or peripheral edema  Abd:  Soft, significant tenderness is present in the epigastrium, non-distended, normoactive bowel sounds are present, no palpable organomegaly and no detectable hernias  Lymph:  No cervical or inguinal adenopathy  Musc:  No cyanosis or clubbing  Skin:  No rashes or ulcers, skin turgor is good  Neuro:  Cranial nerves are grossly intact, no focal motor weakness, follows commands appropriately  Psych:  Good insight, oriented to person, place and time, alert     Labs:     Lab Results   Component Value Date/Time    WBC 10.1 02/22/2019 04:20 AM    HGB 8.2 (L) 02/22/2019 04:20 AM    Hemoglobin (POC) 9.9 (L) 07/21/2013 09:51 PM    HCT 27.4 (L) 02/22/2019 04:20 AM    Hematocrit (POC) 29 (L) 07/21/2013 09:51 PM    PLATELET 618 23/04/5877 04:20 AM    .8 (H) 02/22/2019 04:20 AM     Lab Results   Component Value Date/Time    Hemoglobin A1c 5.2 05/30/2018 03:30 PM    Hemoglobin A1c 5.8 11/22/2017 04:55 AM    Hemoglobin A1c 6.9 (H) 03/03/2017 04:13 AM    Glucose 94 02/22/2019 04:20 AM    Glucose (POC) 110 (H) 02/22/2019 07:36 AM    LDL, calculated 92.6 11/09/2014 03:30 AM    Creatinine (POC) 2.1 (H) 07/21/2013 09:51 PM    Creatinine 7.19 (H) 02/22/2019 04:20 AM      Lab Results   Component Value Date/Time    Cholesterol, total 176 11/09/2014 03:30 AM    HDL Cholesterol 64 11/09/2014 03:30 AM    LDL, calculated 92.6 11/09/2014 03:30 AM    Triglyceride 97 11/09/2014 03:30 AM    CHOL/HDL Ratio 2.8 11/09/2014 03:30 AM     Lab Results   Component Value Date/Time    ALT (SGPT) 12 02/22/2019 04:20 AM    AST (SGOT) 11 (L) 02/22/2019 04:20 AM    Alk.  phosphatase 101 02/22/2019 04:20 AM    Bilirubin, direct 0.1 03/03/2017 04:13 AM    Bilirubin, total 0.4 02/22/2019 04:20 AM    Albumin 2.4 (L) 02/22/2019 04:20 AM    Protein, total 6.6 02/22/2019 04:20 AM    INR 1.7 (H) 02/22/2019 04:20 AM    Prothrombin time 17.0 (H) 02/22/2019 04:20 AM    PLATELET 138 90/90/4284 04:20 AM    Hepatitis B surface Ag <0.10 02/20/2019 08:32 AM     Lab Results   Component Value Date/Time    INR 1.7 (H) 02/22/2019 04:20 AM    INR 1.6 (H) 02/21/2019 10:25 PM    INR 6.4 (>) 02/13/2019 10:43 AM    INR (POC) 1.5 (H) 02/19/2019 11:38 AM    INR POC 1.7 02/18/2019 04:10 PM    Prothrombin time 17.0 (H) 02/22/2019 04:20 AM    Prothrombin time 16.3 (H) 02/21/2019 10:25 PM    Prothrombin time 60.0 (H) 02/13/2019 10:43 AM      Lab Results   Component Value Date/Time    GFR est non-AA 6 (L) 02/22/2019 04:20 AM    GFRNA, POC 24 (L) 07/21/2013 09:51 PM    GFR est AA 7 (L) 02/22/2019 04:20 AM    GFRAA, POC 30 (L) 07/21/2013 09:51 PM    Creatinine 7.19 (H) 02/22/2019 04:20 AM    Creatinine (POC) 2.1 (H) 07/21/2013 09:51 PM    BUN 44 (H) 02/22/2019 04:20 AM    BUN (POC) 30 (H) 07/21/2013 09:51 PM    Sodium 141 02/22/2019 04:20 AM    Sodium (POC) 140 07/21/2013 09:51 PM    Potassium 4.2 02/22/2019 04:20 AM    Potassium (POC) 3.9 07/21/2013 09:51 PM    Chloride 101 02/22/2019 04:20 AM    Chloride (POC) 109 (H) 07/21/2013 09:51 PM    CO2 31 02/22/2019 04:20 AM    Magnesium 1.4 (L) 02/22/2019 04:20 AM    Phosphorus 3.7 02/20/2019 08:32 AM    PTH, Intact 404.3 (H) 03/04/2017 05:02 AM     No results found for: PSA, PSA2, PSAR1, PSA1, PSAR2, PSA3, PSAR3, [id number], [id number], PSALT  No results found for: TSH, TSH2, TSH3, TSHP, TSHELE, TSHEXT, TT3, T3U, T3UP, FRT3, FT3, FT4, FT4P, T4, T4P, FT4T, TT7, TSHEXT   Lab Results   Component Value Date/Time    Glucose 94 02/22/2019 04:20 AM    Glucose (POC) 110 (H) 02/22/2019 07:36 AM      Lab Results   Component Value Date/Time    CK 43 03/03/2017 02:20 PM    CK - MB <1.0 03/03/2017 02:20 PM    CK-MB Index Cannot be calulated 03/03/2017 02:20 PM    Troponin-I, Qt. <0.05 02/22/2019 04:20 AM      Lab Results   Component Value Date/Time    NT pro- (H) 03/02/2017 04:42 AM    NT pro- (H) 02/28/2017 12:55 PM    NT pro- (H) 02/16/2017 01:42 PM    NT pro-BNP 72 07/01/2015 07:42 PM    NT pro- (H) 11/09/2014 12:01 AM    PROBNP 182 09/15/2017 02:29 PM      Lab Results   Component Value Date/Time    Sodium 141 02/22/2019 04:20 AM    Potassium 4.2 02/22/2019 04:20 AM    Chloride 101 02/22/2019 04:20 AM    CO2 31 02/22/2019 04:20 AM    Anion gap 9 02/22/2019 04:20 AM    Glucose 94 02/22/2019 04:20 AM    BUN 44 (H) 02/22/2019 04:20 AM    Creatinine 7.19 (H) 02/22/2019 04:20 AM    BUN/Creatinine ratio 6 (L) 02/22/2019 04:20 AM    GFR est AA 7 (L) 02/22/2019 04:20 AM    GFR est non-AA 6 (L) 02/22/2019 04:20 AM    Calcium 8.1 (L) 02/22/2019 04:20 AM      Lab Results   Component Value Date/Time    Sodium 141 02/22/2019 04:20 AM    Potassium 4.2 02/22/2019 04:20 AM    Chloride 101 02/22/2019 04:20 AM    CO2 31 02/22/2019 04:20 AM    Anion gap 9 02/22/2019 04:20 AM    Glucose 94 02/22/2019 04:20 AM    BUN 44 (H) 02/22/2019 04:20 AM    Creatinine 7.19 (H) 02/22/2019 04:20 AM    BUN/Creatinine ratio 6 (L) 02/22/2019 04:20 AM    GFR est AA 7 (L) 02/22/2019 04:20 AM    GFR est non-AA 6 (L) 02/22/2019 04:20 AM    Calcium 8.1 (L) 02/22/2019 04:20 AM    Bilirubin, total 0.4 02/22/2019 04:20 AM    ALT (SGPT) 12 02/22/2019 04:20 AM    AST (SGOT) 11 (L) 02/22/2019 04:20 AM    Alk.  phosphatase 101 02/22/2019 04:20 AM    Protein, total 6.6 02/22/2019 04:20 AM    Albumin 2.4 (L) 02/22/2019 04:20 AM    Globulin 4.2 (H) 02/22/2019 04:20 AM    A-G Ratio 0.6 (L) 02/22/2019 04:20 AM      Lab Results   Component Value Date/Time    Hemoglobin A1c 5.2 05/30/2018 03:30 PM         Recent Labs     02/22/19  0420   TROIQ <0.05           Problem List:     Problem List  Date Reviewed: 2/13/2019          Codes Class Noted    Chest pain ICD-10-CM: R07.9  ICD-9-CM: 786.50  2/22/2019        S/P cardiac cath ICD-10-CM: Z98.890  ICD-9-CM: V45.89  2/19/2019    Overview Signed 2/19/2019  3:05 PM by Andrew Whittaker NP     2/19/19:  Successful  of RCA             Unstable angina (HCC) ICD-10-CM: I20.0  ICD-9-CM: 411.1  1/18/2019        (HFpEF) heart failure with preserved ejection fraction ([facility] ICD-10-CM: I50.30  ICD-9-CM: 428.9  9/23/2018        ESRD on hemodialysis [facility]) ICD-10-CM: N18.6, Z99.2  ICD-9-CM: 585.6, V45.11  6/23/2018        Limited mobility ICD-10-CM: Z74.09  ICD-9-CM: V49.89  6/21/2018        Hx of deep venous thrombosis ICD-10-CM: Z86.718  ICD-9-CM: V12.51  5/30/2018        Diabetic gastroparesis (HCC) ICD-10-CM: E11.43, K31.84  ICD-9-CM: 250.60, 536.3  Unknown        GERD (gastroesophageal reflux disease) ICD-10-CM: K21.9  ICD-9-CM: 530.81  Unknown        Rheumatoid arteritis ICD-10-CM: I00  ICD-9-CM: 447.8  Unknown        DM type 2 causing neurological disease ([facility] ICD-10-CM: E11.49  ICD-9-CM: 250.60  Unknown        DM type 2 causing renal disease (HCC) ICD-10-CM: E11.29  ICD-9-CM: 250.40  Unknown        DM type 2 causing vascular disease (HCC) ICD-10-CM: E11.59  ICD-9-CM: 250.70, 443.81  Unknown        Gout ICD-10-CM: M10.9  ICD-9-CM: 274.9  Unknown        ILD (interstitial lung disease) ([facility] ICD-10-CM: J84.9  ICD-9-CM: 202  8/20/2017        Warfarin anticoagulation ICD-10-CM: Z79.01  ICD-9-CM: V58.61  8/20/2017        COPD, severe ([facility] ICD-10-CM: J44.9  ICD-9-CM: 592  6/21/2017        DM (diabetes mellitus), type 2 with renal complications (HCC) (Chronic) ICD-10-CM: E11.29  ICD-9-CM: 250.40  8/9/2013        Normocytic anemia (Chronic) ICD-10-CM: D64.9  ICD-9-CM: 285.9  5/26/2013        HTN (hypertension) (Chronic) ICD-10-CM: I10  ICD-9-CM: 401.9  10/1/2012        Morbid obesity (Chronic) ICD-10-CM: E66.01  ICD-9-CM: 278.01  10/1/2012        Esophageal reflux ICD-10-CM: K21.9  ICD-9-CM: 530.81  10/1/2012        Gastroparesis ICD-10-CM: K31.84  ICD-9-CM: 536.3  10/1/2012        Pulmonary HTN (Chronic) ICD-10-CM: I27.20  ICD-9-CM: 416.8  3/21/2012        CAD (coronary Artery Disease) Native Artery (Chronic) ICD-10-CM: I25.10  ICD-9-CM: 414.01  2/16/2011    Overview Addendum 10/5/2012  7:33 AM by PRASHANT Bell     [place] 2004  1v CAD by cath - PCI OM with SAL  Cath 5/2004 - patent stent, no new disease  Lexiscan cardiolite 12/09- normal perfusion, EF 66%  Cath: 3/19/12: PA 55/30 mean 41, wedge 26, RA 24, EDP 35, LVG 55%, LM normal, LAD normal, LCX - OM1 patent stent, OM2 prox 85% long, % with L to R collaterals                JASEN on CPAP (Chronic) ICD-10-CM: G47.33, Z99.89  ICD-9-CM: 327.23, V46.8  2/16/2011    Overview Signed 3/21/2012  8:04 AM by PRASHANT Bell Dr. CPAP             Sleep apnea ICD-10-CM: G47.30  ICD-9-CM: 780.57  2/16/2011                Yuan Bridges MD, [facility]

## 2019-02-22 NOTE — ED NOTES
Bedside and Verbal shift change report given to Hallie Anglin RN (oncoming nurse) by Stewart Michele RN (offgoing nurse). Report included the following information SBAR, ED Summary, Intake/Output and MAR.

## 2019-02-22 NOTE — PROGRESS NOTES
Family practice contacted regarding patient's request for additional pain medication. They indicated to give her cold compresses for her chest pain. Cold compresses offered, patient refused but compresses left at bedside. Percocet administered but patient informed that she would not have any additional doses today. [facility] paged to determine dialysis time. Still awaiting call back at this time.

## 2019-02-22 NOTE — PROGRESS NOTES
Reviewed chart. Patient scheduled for a doppler to rule out DVTs. Will await results of dopplers before initiating Physical Therapy Evaluation.

## 2019-02-22 NOTE — PROGRESS NOTES
2/22/2019 
10:00 AM 
Case management note Met with patient to discuss discharge planning. Patient lives with spouse in single story home with 2 steps to enter. She uses a cane and is independent with ADL's. [facility] [address] No advance directive Dr. Tran Monk nn notified Reason for Admission:   Chest pain RRAT Score:     31 
          
Resources/supports as identified by patient/family:   Family support Top Challenges facing patient (as identified by patient/family and CM): Finances/Medication cost?      None identified Transportation?  family Support system or lack thereof?  family Living arrangements? Spouse/ single level Self-care/ADLs/Cognition? Can do self care/ fair health cognition Current Advanced Directive/Advance Care Plan:  no 
                       
Plan for utilizing home health:    TBA Likelihood of readmission: high/red Transition of Care Plan:     Home with family support/ out patient follow up Care Management Interventions PCP Verified by CM: Yes Mode of Transport at Discharge: Self Transition of Care Consult (CM Consult): Discharge Planning Current Support Network: Lives with Spouse Confirm Follow Up Transport: Family Plan discussed with Pt/Family/Caregiver: Yes Discharge Location Discharge Placement: Home with family assistance [place], [place]

## 2019-02-22 NOTE — ED NOTES
Pt Throughput: Receiving 5th floor Charge RN made aware of patient's bed placement.   
 
Sri Varghese RN

## 2019-02-22 NOTE — PROGRESS NOTES
[facility]: MED RECONCILIATION Comments/Recommendations:  
 
· Patient able to confirm name, , allergies and preferred pharmacy · Patient states she is adherent to medications · Of note pt takes warfarin 3 mg daily except on  · Pt takes 10 mg amlodipine daily  and 37.5 mg carvedilol BID Medications added: · Amlodipine 10 mg · Warfain 3 mg daily except Friday (separate orders) Medications removed: · Amlodipine 5 mg · Warfarin 3 mg daily Medications adjusted: 
 
· none Information obtained from: Patient Allergies: Contrast dye [iodine]; Levaquin [levofloxacin]; and Morphine Prior to Admission Medications:  
 
Prior to Admission Medications Prescriptions Last Dose Informant Patient Reported? Taking? albuterol (PROAIR HFA) 90 mcg/actuation inhaler 2019 at Unknown time Self Yes Yes Sig: Take 2 Puffs by inhalation every four (4) hours as needed for Wheezing. allopurinol (ZYLOPRIM) 100 mg tablet 2019 at Unknown time Self No Yes Sig: TAKE 1 TABLET BY MOUTH EVERY DAY  
amLODIPine (NORVASC) 10 mg tablet 2019 at am Self Yes Yes Sig: Take 10 mg by mouth daily. atorvastatin (LIPITOR) 80 mg tablet 2019 at pm Self Yes Yes Sig: Take 80 mg by mouth nightly. carvedilol (COREG) 25 mg tablet 2019 at Unknown time Self No Yes Sig: Take 1.5 Tabs by mouth two (2) times daily (with meals). clopidogrel (PLAVIX) 75 mg tab 2019 at Unknown time Self No Yes Sig: Take 1 Tab by mouth daily. diclofenac (VOLTAREN) 1 % gel 2019 at Unknown time Self No Yes Sig: Apply 2 g to affected area four (4) times daily. As needed for right knee pain  
ergocalciferol (VITAMIN D2) 50,000 unit capsule 2019 at Unknown time Self Yes Yes Sig: Take 50,000 Units by mouth every Tuesday. insulin aspart protamine/insulin aspart (NOVOLOG MIX 70-30 U-100 INSULN) 100 unit/mL (70-30) injection 2019 at Unknown time Self Yes Yes Sig: 10-40 Units by SubCUTAneous route nightly as needed (BG > 160). isosorbide mononitrate ER (IMDUR) 120 mg CR tablet 2019 at Unknown time Self No Yes Sig: TAKE 1 TABLET BY MOUTH ONCE DAILY  
nitroglycerin (NITROSTAT) 0.3 mg SL tablet 2019 at Unknown time Self No Yes Si Tab by SubLINGual route every five (5) minutes as needed for Chest Pain. oxyCODONE-acetaminophen (PERCOCET) 7.5-325 mg per tablet 2019 at Unknown time Self No Yes Sig: Take 1 Tab by mouth two (2) times daily as needed for Pain. Max Daily Amount: 2 Tabs. pantoprazole (PROTONIX) 40 mg tablet 2019 at Unknown time Self No Yes Sig: TAKE 1 TABLET BY MOUTH EVERY DAY FOR HEARTBURN  
warfarin (COUMADIN) 3 mg tablet 2019 at Unknown time  Yes Yes Sig: Take 3 mg by mouth Every Tues, Thur & Sat.  
warfarin (COUMADIN) 3 mg tablet 2019  Yes Yes Sig: Take 3 mg by mouth Every Mon, Wed and Sat. Facility-Administered Medications: None Luisito Azevedo Pharm. D. Clinical Staff Pharmacist 
[address] [place] [address] [phone]

## 2019-02-22 NOTE — H&P
[address]  
Admission H&P Date of admission: 2/21/2019 Patient name: Polly Panchal MRN: [id number] YOB: 1955 Age: 66 y.o. Primary care provider:  Bee Welsh DO Source of Information: patient, medical records Chief complaint:  \"Chest Pain\" History of Present Illness Polly Panchal is a 66 y.o. female with PMH of CAD, 4 stents, ESRD on dialysis, COPD, T2DM, and obesity who presents to the ER complaining of chest pain. Pt complaining of 1 day of chest pain \"tightness\", 12/10 intensity, not relieved by nitroglycerin. Pt had a recent stent placed 2/19/19, was started on Plavix, and restarted her coumadin 2/20/19 for hx of DVT. On dialysis MWF for ESRD. On 2L O2 at home for COPD. Pt also reports a hx of \"silent\" MI in 2012. Pt denies any dizziness, abdominal pain, LOC, trauma, numbness, or weakness. Nena Peaks In the ER, vital signs were remarkable for O2 99% on 2L NC. Labs were remarkable for WBC 11.8, Hgb 8.9 (Bl 8-9), Cr 6.98, Mag 1.5, and INR 1.6.  
-CXR showed chronic bilateral opacities, resolution of pulmonary edema. -EKG showed SR with occasional PVCs. -Pt was treated with dilaudid 1mg x2. Chart reviewed. Pt seen with Dr. Yumi Foster. Home Medications Prior to Admission medications Medication Sig Start Date End Date Taking? Authorizing Provider  
clopidogrel (PLAVIX) 75 mg tab Take 1 Tab by mouth daily. 2/20/19  Yes Sydni Odonnell, CABRERA  
warfarin (COUMADIN) 3 mg tablet TAKE 1 TABLET BY MOUTH EVERY DAY 2/9/19  Yes Provider, Historical  
diclofenac (VOLTAREN) 1 % gel Apply 2 g to affected area four (4) times daily. As needed for right knee pain 2/7/19  Yes Hallie Levine DO  
oxyCODONE-acetaminophen (PERCOCET) 7.5-325 mg per tablet Take 1 Tab by mouth two (2) times daily as needed for Pain. Max Daily Amount: 2 Tabs.  2/7/19  Yes Lobb, Soundra Crumbly, DO  
 insulin aspart protamine/insulin aspart (NOVOLOG MIX 70-30 U-100 INSULN) 100 unit/mL (70-30) injection 10-40 Units by SubCUTAneous route nightly as needed (BG > 160). Yes Provider, Historical  
amLODIPine (NORVASC) 5 mg tablet Take 5 mg by mouth daily. Yes Provider, Historical  
isosorbide mononitrate ER (IMDUR) 120 mg CR tablet TAKE 1 TABLET BY MOUTH ONCE DAILY 12/24/18  Yes Goran Patrick MD  
allopurinol (ZYLOPRIM) 100 mg tablet TAKE 1 TABLET BY MOUTH EVERY DAY 12/13/18  Yes Hallie Levine DO  
nitroglycerin (NITROSTAT) 0.3 mg SL tablet 1 Tab by SubLINGual route every five (5) minutes as needed for Chest Pain. 9/23/18  Yes Goran Patrick MD  
carvedilol (COREG) 25 mg tablet Take 1.5 Tabs by mouth two (2) times daily (with meals). 9/23/18  Yes Goran Patrick MD  
pantoprazole (PROTONIX) 40 mg tablet TAKE 1 TABLET BY MOUTH EVERY DAY FOR HEARTBURN 9/11/18  Yes Hallie Levine DO  
albuterol (PROAIR HFA) 90 mcg/actuation inhaler Take 2 Puffs by inhalation every four (4) hours as needed for Wheezing. Yes Provider, Historical  
ergocalciferol (VITAMIN D2) 50,000 unit capsule Take 50,000 Units by mouth every Tuesday. Yes Other, MD Abhijit  
atorvastatin (LIPITOR) 80 mg tablet Take 80 mg by mouth nightly. Yes Provider, Historical  
 
 
 
Allergies Allergies Allergen Reactions  Contrast Dye [Iodine] Anaphylaxis Tolerates when pre medicated w/ IV solumedrol and benadryl prior to procedure  Levaquin [Levofloxacin] Nausea and Vomiting  Morphine Hives and Itching Past Medical History:  
Diagnosis Date   Sleep Apnea 2/16/2011  Aortic aneurysm ([facility]  CAD (coronary Artery Disease) Native Artery 2/16/2011  CKD (chronic kidney disease) stage 4, GFR 15-29 ml/min ([facility]) 2/10/2017  COPD (chronic obstructive pulmonary disease) ([facility])  Diabetic gastroparesis ([facility]  Diastolic heart failure ([facility] 10/5/2012  DM type 2 causing neurological disease ([facility]  DM type 2 causing renal disease ([facility]  DM type 2 causing vascular disease ([facility]  Esophageal stricture   
 dialted Dr. Isael Darden  G6PD deficiency (HCC)   
 trait  GERD (gastroesophageal reflux disease)  Gout  ILD (interstitial lung disease) ([facility] open lung bx CJW   Morbid obesity ([facility]  OA (osteoarthritis)  Ovarian cancer ([facility] cervical and uterine  Rheumatoid arteritis  S/P left pulmonary artery pressure sensor implant placement 2017 [facility]  Tobacco use disorder 3/21/2012  Uterine cervix cancer ([facility]  Vitamin D deficiency 2013 Past Surgical History:  
Procedure Laterality Date  CARDIAC SURG PROCEDURE UNLIST    
 stents  COLONOSCOPY N/A 2016 COLONOSCOPY performed by Stephanie Mares MD at [address] [place] [address]  HX CARPAL TUNNEL RELEASE    
 bilateral  
 HX CHOLECYSTECTOMY  HX HERNIA REPAIR    
 HX HYSTERECTOMY  HX ORTHOPAEDIC  12  
 right knee replacement  HX TONSIL AND ADENOIDECTOMY  UPPER GI ENDOSCOPY,DILATN W GUIDE  2016 Family History Problem Relation Age of Onset  Heart Disease Mother  Heart Disease Brother Social History Socioeconomic History  Marital status:  Spouse name: Not on file  Number of children: Not on file  Years of education: Not on file  Highest education level: Not on file Social Needs  Financial resource strain: Not on file  Food insecurity - worry: Not on file  Food insecurity - inability: Not on file  Transportation needs - medical: Not on file  Transportation needs - non-medical: Not on file Occupational History  Not on file Tobacco Use  Smoking status: Former Smoker Packs/day: 0.50 Years: 41.00 Pack years: 20.50 Types: Cigarettes Last attempt to quit: 2014 Years since quittin.2  Smokeless tobacco: Never Used Substance and Sexual Activity  Alcohol use: No  
 Drug use: No  
 Sexual activity: Not on file Other Topics Concern  Not on file Social History Narrative  Not on file Social History Patient resides X  Independently With family care Assisted living SNF Ambulates Independently X  With cane Assisted walker Alcohol history X  None Social  
  Chronic Smoking history None X  Former smoker Current smoker Social History Tobacco Use Smoking Status Former Smoker  Packs/day: 0.50  Years: 41.00  
 Pack years: 20.50  Types: Cigarettes  Last attempt to quit: 2014  Years since quittin.2 Smokeless Tobacco Never Used Drug history X  None Former drug user Current drug user Code status X  Full code DNR/DNI Partial   
Code status discussed with the patient/caregivers. Review of Systems Review of Systems Constitutional: Negative for chills and fever. HENT: Negative for congestion, sinus pain and sore throat. Eyes: Negative for blurred vision and double vision. Respiratory: Positive for cough. Negative for shortness of breath and wheezing. Cardiovascular: Positive for chest pain and leg swelling. Gastrointestinal: Positive for constipation and nausea. Negative for abdominal pain, blood in stool, diarrhea and vomiting. Genitourinary:  
     ESRD, on dialysis Musculoskeletal: Positive for joint pain and myalgias. Negative for falls. Skin: Negative for itching and rash. Neurological: Negative for dizziness, focal weakness, loss of consciousness and headaches. Psychiatric/Behavioral: Positive for substance abuse (smoking). Physical Exam 
Visit Vitals /52 Pulse 66 Temp 98.3 °F (36.8 °C) Resp 13 Ht 5' 10\" (1.778 m) Wt 303 lb (137.4 kg) SpO2 98% BMI 43.48 kg/m² General: No acute distress. Alert. Cooperative. Obese. Head: Normocephalic. Atraumatic. Eyes:  Conjunctiva pink. Sclera white. PERRL. Ears:  External ears normal.  
Nose:  Septum midline. Mucosa pink. No drainage. Throat: Mucosa pink. Moist mucous membranes. No tonsillar exudates or erythema. Palate movement equal bilaterally. Neck: Supple. Normal ROM. No stiffness. Respiratory: CTAB. No w/r/r/c.  
Cardiovascular: CP reproducible with palpation. RRR. Normal S1,S2. No m/r/g. Pulses 2+ throughout. GI: + bowel sounds. Nontender. No rebound tenderness or guarding. Nondistended. Extremities: Nonpitting edema. Fistula LUE. No palpable cord. Musculoskeletal: Full ROM in all extremities. Neuro: CN II-XII grossly intact. Strength 5/5 in all extremities. Sensation intact in all extremities. Skin: No rashes. No ulcers. Laboratory Data Recent Results (from the past 24 hour(s)) CBC WITH AUTOMATED DIFF Collection Time: 02/21/19 10:25 PM  
Result Value Ref Range WBC 11.8 (H) 3.6 - 11.0 K/uL  
 RBC 2.83 (L) 3.80 - 5.20 M/uL HGB 8.9 (L) 11.5 - 16.0 g/dL HCT 29.5 (L) 35.0 - 47.0 % .2 (H) 80.0 - 99.0 FL  
 MCH 31.4 26.0 - 34.0 PG  
 MCHC 30.2 30.0 - 36.5 g/dL  
 RDW 14.6 (H) 11.5 - 14.5 % PLATELET 080 401 - 546 K/uL MPV 9.4 8.9 - 12.9 FL  
 NRBC 0.0 0  WBC ABSOLUTE NRBC 0.00 0.00 - 0.01 K/uL NEUTROPHILS 67 32 - 75 % LYMPHOCYTES 20 12 - 49 % MONOCYTES 9 5 - 13 % EOSINOPHILS 2 0 - 7 % BASOPHILS 0 0 - 1 % IMMATURE GRANULOCYTES 1 (H) 0.0 - 0.5 % ABS. NEUTROPHILS 8.0 1.8 - 8.0 K/UL  
 ABS. LYMPHOCYTES 2.4 0.8 - 3.5 K/UL  
 ABS. MONOCYTES 1.0 0.0 - 1.0 K/UL  
 ABS. EOSINOPHILS 0.3 0.0 - 0.4 K/UL  
 ABS. BASOPHILS 0.0 0.0 - 0.1 K/UL  
 ABS. IMM. GRANS. 0.2 (H) 0.00 - 0.04 K/UL  
 DF AUTOMATED MAGNESIUM Collection Time: 02/21/19 10:25 PM  
Result Value Ref Range Magnesium 1.5 (L) 1.6 - 2.4 mg/dL METABOLIC PANEL, BASIC Collection Time: 02/21/19 10:25 PM  
Result Value Ref Range Sodium 140 136 - 145 mmol/L Potassium 4.1 3.5 - 5.1 mmol/L Chloride 100 97 - 108 mmol/L  
 CO2 32 21 - 32 mmol/L Anion gap 8 5 - 15 mmol/L Glucose 112 (H) 65 - 100 mg/dL BUN 43 (H) 6 - 20 MG/DL Creatinine 6.98 (H) 0.55 - 1.02 MG/DL  
 BUN/Creatinine ratio 6 (L) 12 - 20 GFR est AA 7 (L) >60 ml/min/1.73m2 GFR est non-AA 6 (L) >60 ml/min/1.73m2 Calcium 8.3 (L) 8.5 - 10.1 MG/DL PROTHROMBIN TIME + INR Collection Time: 02/21/19 10:25 PM  
Result Value Ref Range INR 1.6 (H) 0.9 - 1.1 Prothrombin time 16.3 (H) 9.0 - 11.1 sec PTT Collection Time: 02/21/19 10:25 PM  
Result Value Ref Range aPTT 30.0 22.1 - 32.0 sec  
 aPTT, therapeutic range     58.0 - 77.0 SECS  
SAMPLES BEING HELD Collection Time: 02/21/19 10:27 PM  
Result Value Ref Range SAMPLES BEING HELD 1SST,1RED COMMENT Add-on orders for these samples will be processed based on acceptable specimen integrity and analyte stability, which may vary by analyte. POC TROPONIN-I Collection Time: 02/21/19 10:36 PM  
Result Value Ref Range Troponin-I (POC) <0.04 0.00 - 0.08 ng/mL Imaging 
-CXR showed chronic bilateral opacities, resolution of pulmonary edema. EKG:  SR with occasional PVCs. Assessment and Plan Dao Acosta is a 66 y.o. female with PMH of CAD, 4 stents, ESRD on dialysis, COPD, T2DM, and obesity who is admitted for chest pain in the setting of stent placement 2/19/19 and hx of DVT on coumadin. Chest Pain - in the setting of stent placement 2/19/19 and hx of DVT, pt on coumadin 3mg every day except for Fridays. INR 1.6, Trop neg x1, EKG WNL.  
-Admit to telemetry 
-Kurt trops q6 
-VQ Scan 
-Duplex bilateral LE 
-NPO 
-Consult cards, appreciate recs 
-Continue home Imdur 120mg daily 
-Daily CBC HFpEF - Last ECHO 12/13/17 EF 60% -Repeat ECHO 
 
ESRD - Pt on dialysis MWF 
-Nephrology consulted for dialysis 
-Daily CMP, Mag 
 
 T2DM - home regimen is insulin aspart protamine/insulin aspart 70/30 mix nightly 10-40u as needed, pt sometimes takes 0u, sometimes 20u -SSI with hypoglycemic protocol 
-POC glucose ACHS 
-Will adjust as needed COPD - Stable. On 2L O2 at home, stable on 2L NC in ED 
-O2 as needed to keep O2 88-94% 
-Albuterol PRN Anemia - Hgb 8.9 POA (Bl 8-9) -Daily CBC Gout 
-Continue home Allopurinol 100mg daily HLD  
-Continue home Lipitor 80mg nightly GERD  
-Continue home Protonix 40mg daily Obesity - Body mass index is 43.48 kg/m². -Encouraging lifestyle modifications Chronic Pain  
-Continue home Percocet 7.5 BID PRN 
 
 
FEN/GI - NPO. NS at 75 mL/hr. Activity - With assistance DVT prophylaxis - Heparin x1, will reevaluate after cardiology recs GI prophylaxis -  Protonix Disposition - Plan to d/c TBD. PT/OT. CODE STATUS:  Full Patient discussed with Dr. Chi Diallo, on-call attending physician. Mark Kerns MD 
Family Medicine Resident

## 2019-02-22 NOTE — PROGRESS NOTES
Ezio Craig Myesha [place] [address] [facility] YOB: 1955 Assessment & Plan:  
ESRD on HD MWF at [facility]) CAD s/p PCI Chest pain, w/u in progress HTN Anemia of CKD SHPT Rec: 
HD this pm after testing ALDO for anemia Hectorol for SHPT Subjective:  
CC: f/u ESRD 
HPI: Patient of Dr. Ian Cantu with ESRD on HD MWF at [facility] presents with CP. She had a cath and PCI Tuesday and felt fine till mid day yesterday. She has sharp SSCP worse with inpiration. NZs are ok. She is being worked up. She has not had HD today. She is on EPO for anemia and Hectorol for SHPT. Last HD was wed and was uncomplicated. She dialyzes via LUE AVF  
ROS: +CP, no n/v Current Facility-Administered Medications Medication Dose Route Frequency  sodium chloride (NS) flush 5-40 mL  5-40 mL IntraVENous Q8H  
 sodium chloride (NS) flush 5-40 mL  5-40 mL IntraVENous PRN  
 amLODIPine (NORVASC) tablet 5 mg  5 mg Oral DAILY  atorvastatin (LIPITOR) tablet 80 mg  80 mg Oral QHS  carvedilol (COREG) tablet 37.5 mg  37.5 mg Oral BID WITH MEALS  
 allopurinol (ZYLOPRIM) tablet 100 mg  100 mg Oral DAILY  oxyCODONE-acetaminophen (PERCOCET 7.5) 7.5-325 mg per tablet 1 Tab  1 Tab Oral BID PRN  
 isosorbide mononitrate ER (IMDUR) tablet 120 mg  120 mg Oral DAILY  glucose chewable tablet 16 g  4 Tab Oral PRN  
 dextrose (D50W) injection syrg 12.5-25 g  25-50 mL IntraVENous PRN  
 glucagon (GLUCAGEN) injection 1 mg  1 mg IntraMUSCular PRN  
 albuterol (PROVENTIL VENTOLIN) nebulizer solution 2.5 mg  2.5 mg Nebulization Q4H PRN  
 insulin lispro (HUMALOG) injection   SubCUTAneous Q6H  
 clopidogrel (PLAVIX) tablet 75 mg  75 mg Oral DAILY  pantoprazole (PROTONIX) tablet 40 mg  40 mg Oral ACB&D  
 mylanta/viscous lidocaine (GI COCKTAIL)  40 mL Oral ONCE Current Outpatient Medications Medication Sig  
  amLODIPine (NORVASC) 10 mg tablet Take 10 mg by mouth daily.  warfarin (COUMADIN) 3 mg tablet Take 3 mg by mouth Every Tues, Thur & Sat.  warfarin (COUMADIN) 3 mg tablet Take 3 mg by mouth Every Mon, Wed and Sat.  clopidogrel (PLAVIX) 75 mg tab Take 1 Tab by mouth daily.  diclofenac (VOLTAREN) 1 % gel Apply 2 g to affected area four (4) times daily. As needed for right knee pain  oxyCODONE-acetaminophen (PERCOCET) 7.5-325 mg per tablet Take 1 Tab by mouth two (2) times daily as needed for Pain. Max Daily Amount: 2 Tabs.  insulin aspart protamine/insulin aspart (NOVOLOG MIX 70-30 U-100 INSULN) 100 unit/mL (70-30) injection 10-40 Units by SubCUTAneous route nightly as needed (BG > 160).  isosorbide mononitrate ER (IMDUR) 120 mg CR tablet TAKE 1 TABLET BY MOUTH ONCE DAILY  allopurinol (ZYLOPRIM) 100 mg tablet TAKE 1 TABLET BY MOUTH EVERY DAY  nitroglycerin (NITROSTAT) 0.3 mg SL tablet 1 Tab by SubLINGual route every five (5) minutes as needed for Chest Pain.  carvedilol (COREG) 25 mg tablet Take 1.5 Tabs by mouth two (2) times daily (with meals).  pantoprazole (PROTONIX) 40 mg tablet TAKE 1 TABLET BY MOUTH EVERY DAY FOR HEARTBURN  
 albuterol (PROAIR HFA) 90 mcg/actuation inhaler Take 2 Puffs by inhalation every four (4) hours as needed for Wheezing.  ergocalciferol (VITAMIN D2) 50,000 unit capsule Take 50,000 Units by mouth every Tuesday.  atorvastatin (LIPITOR) 80 mg tablet Take 80 mg by mouth nightly. Objective:  
 
Vitals: 
Blood pressure 105/55, pulse 68, temperature 98.1 °F (36.7 °C), resp. rate 15, height 5' 10\" (1.778 m), weight 137.4 kg (303 lb), SpO2 98 %. Temp (24hrs), Av.9 °F (36.6 °C), Min:97.3 °F (36.3 °C), Max:98.3 °F (36.8 °C) Intake and Output: 
No intake/output data recorded. 1901 -  0700 In: 107.5 [I.V.:107.5] Out: - Physical Exam:              
GENERAL ASSESSMENT: NAD 
CHEST: CTA HEART: S1S2 ABDOMEN: Soft,NT 
 EXTREMITY: no EDEMA; AVF LUE +t/b NEURO: Grossly non focal 
 
 
   
ECG/rhythm: 
 
Data Review Recent Labs  
  02/21/19 2236 TNIPOC <0.04 Recent Labs  
  02/22/19 
5971 TROIQ <0.05 Recent Labs  
  02/22/19 
0420 02/21/19 2225 02/20/19 
9665  140 138  
K 4.2 4.1 4.8  
 100 102 CO2 31 32 26 BUN 44* 43* 46* CREA 7.19* 6.98* 8.55* GLU 94 112* 204* PHOS  --   --  3.7 MG 1.4* 1.5*  --   
CA 8.1* 8.3* 8.4* ALB 2.4*  --  2.6* WBC 10.1 11.8* 14.5* HGB 8.2* 8.9* 9.3* HCT 27.4* 29.5* 30.1*  
 349 356 Recent Labs  
  02/22/19 0420 02/21/19 2225 INR 1.7* 1.6* PTP 17.0* 16.3* APTT  --  30.0 Needs: urine analysis, urine sodium, protein and creatinine Lab Results Component Value Date/Time Sodium,urine random 25 11/10/2014 12:40 PM  
 Creatinine, urine 145.29 03/04/2017 05:01 AM  
 
 
 
 
: Kimo Strong MD 
2/22/2019 [facility]: 
[email or web address] [email or web address] [place] office: 
[address], [address] [place], [address] [place] [address] Phone: [phone] Fax :     [phone] [place] office: 
[address] [facility] [place], [address] Phone - [phone] Fax - [phone]

## 2019-02-22 NOTE — PROGRESS NOTES
Occupational therapy note: 
Orders received, chart reviewed. Patient scheduled for testing to rule out blood clots. Will await results prior to initiating OT services. Maryjane Saxena MS OTR/L

## 2019-02-22 NOTE — ED NOTES
Spoke with Dr Kayla Rodrigues in regards to patient's continued pain. He states he will order additional Dilaudid.

## 2019-02-22 NOTE — PROGRESS NOTES
[facility] Dosing Services: 2/22/2019 Consult for Warfarin Dosing by Pharmacy by Dr. Palak Danielson Consult provided for this 66 y.o.  female , for indication of Thromboembolism (Due to Prosthetic Heart Valves) . Pt has Hx of CAD s/p RCA stent 2/19/19 PTA regimen is 3 mg daily except Friday Day of Therapy Continue from home Dose to achieve an INR goal of 2-3 Order entered for  Warfarin  3 (mg) ordered to be given today at 18:00. Significant drug interactions: clopidogrel Previous dose given 3 mg PTA  
PT/INR Lab Results Component Value Date/Time INR 1.7 (H) 02/22/2019 04:20 AM  
  
Platelets Lab Results Component Value Date/Time PLATELET 723 06/73/4770 04:20 AM  
  
H/H Lab Results Component Value Date/Time HGB 8.2 (L) 02/22/2019 04:20 AM  
  
 
Pharmacy to follow daily and will provide subsequent Warfarin dosing based on clinical status. DEBORAH Wise  Contact information [phone]

## 2019-02-22 NOTE — PROGRESS NOTES
1900: Pt has CTA of chest ordered, allergic to contrast dye. Notified FP Resident MD who stated to ask pharmacy for pre-medication dosing of solumedrol & benadryl. Notified FP Resident MD of recommendations by pharmacy per policy. FP Resident stated OK to place orders. 0915: Notified CT that pt has been pre-medicated for contrast allergy. 1205: FP Resident MD stated OK for pt to have renal diet at this time. 1311: TRANSFER - OUT REPORT: 
 
Verbal report given to April RN (name) on Triny Mirza  being transferred to [facility] (unit) for routine progression of care Report consisted of patients Situation, Background, Assessment and  
Recommendations(SBAR). Information from the following report(s) SBAR, Kardex, Intake/Output, Recent Results and Cardiac Rhythm NSR was reviewed with the receiving nurse. Lines:  
Peripheral IV 02/21/19 Right Antecubital (Active) Site Assessment Clean, dry, & intact 2/22/2019  7:40 AM  
Phlebitis Assessment 0 2/22/2019  7:40 AM  
Infiltration Assessment 0 2/22/2019  7:40 AM  
Dressing Status Clean, dry, & intact 2/22/2019  7:40 AM  
Dressing Type Transparent 2/22/2019  7:40 AM  
Hub Color/Line Status Green 2/22/2019  7:40 AM  
Action Taken Open ports on tubing capped 2/22/2019  7:40 AM  
Alcohol Cap Used Yes 2/22/2019  7:40 AM  
  
 
Opportunity for questions and clarification was provided. Patient transported with: 
 Monitor O2 @ 2 liters **Confirmed pt's level of care w/ FP resident MERCADO- orders to be placed to remote telemetry. FP Resident MD stated that she does not want to consult GI at this time & pt is to f/u O/P with them. 1500: TRANSFER - OUT REPORT: 
 
Verbal report given to [address] (name) on Triny Mirza  being transferred to 5th floor room 503 (unit) for routine progression of care Report consisted of patients Situation, Background, Assessment and  
Recommendations(SBAR). Information from the following report(s) SBAR, Kardex, Intake/Output, Recent Results and Cardiac Rhythm NSR was reviewed with the receiving nurse. Lines:  
Peripheral IV 02/21/19 Right Antecubital (Active) Site Assessment Clean, dry, & intact 2/22/2019  7:40 AM  
Phlebitis Assessment 0 2/22/2019  7:40 AM  
Infiltration Assessment 0 2/22/2019  7:40 AM  
Dressing Status Clean, dry, & intact 2/22/2019  7:40 AM  
Dressing Type Transparent 2/22/2019  7:40 AM  
Hub Color/Line Status Green 2/22/2019  7:40 AM  
Action Taken Open ports on tubing capped 2/22/2019  7:40 AM  
Alcohol Cap Used Yes 2/22/2019  7:40 AM  
  
 
Opportunity for questions and clarification was provided. Patient transported with: 
 Monitor O2 @ 2 liters Registered Nurse

## 2019-02-23 VITALS
WEIGHT: 293 LBS | HEIGHT: 70 IN | SYSTOLIC BLOOD PRESSURE: 121 MMHG | DIASTOLIC BLOOD PRESSURE: 62 MMHG | TEMPERATURE: 98.7 F | RESPIRATION RATE: 18 BRPM | HEART RATE: 75 BPM | OXYGEN SATURATION: 96 % | BODY MASS INDEX: 41.95 KG/M2

## 2019-02-23 LAB
ALBUMIN SERPL-MCNC: 2.4 G/DL (ref 3.5–5)
ALBUMIN/GLOB SERPL: 0.5 {RATIO} (ref 1.1–2.2)
ALP SERPL-CCNC: 106 U/L (ref 45–117)
ALT SERPL-CCNC: 9 U/L (ref 12–78)
ANION GAP SERPL CALC-SCNC: 7 MMOL/L (ref 5–15)
AST SERPL-CCNC: 10 U/L (ref 15–37)
BASOPHILS # BLD: 0 K/UL (ref 0–0.1)
BASOPHILS NFR BLD: 0 % (ref 0–1)
BILIRUB SERPL-MCNC: 0.3 MG/DL (ref 0.2–1)
BUN SERPL-MCNC: 37 MG/DL (ref 6–20)
BUN/CREAT SERPL: 7 (ref 12–20)
CALCIUM SERPL-MCNC: 8.2 MG/DL (ref 8.5–10.1)
CHLORIDE SERPL-SCNC: 101 MMOL/L (ref 97–108)
CO2 SERPL-SCNC: 31 MMOL/L (ref 21–32)
CREAT SERPL-MCNC: 5.41 MG/DL (ref 0.55–1.02)
DIFFERENTIAL METHOD BLD: ABNORMAL
EOSINOPHIL # BLD: 0 K/UL (ref 0–0.4)
EOSINOPHIL NFR BLD: 0 % (ref 0–7)
ERYTHROCYTE [DISTWIDTH] IN BLOOD BY AUTOMATED COUNT: 14.6 % (ref 11.5–14.5)
GLOBULIN SER CALC-MCNC: 4.4 G/DL (ref 2–4)
GLUCOSE BLD STRIP.AUTO-MCNC: 156 MG/DL (ref 65–100)
GLUCOSE BLD STRIP.AUTO-MCNC: 200 MG/DL (ref 65–100)
GLUCOSE BLD STRIP.AUTO-MCNC: 223 MG/DL (ref 65–100)
GLUCOSE SERPL-MCNC: 148 MG/DL (ref 65–100)
HCT VFR BLD AUTO: 29.1 % (ref 35–47)
HGB BLD-MCNC: 8.5 G/DL (ref 11.5–16)
IMM GRANULOCYTES # BLD AUTO: 0.2 K/UL (ref 0–0.04)
IMM GRANULOCYTES NFR BLD AUTO: 2 % (ref 0–0.5)
INR PPP: 1.9 (ref 0.9–1.1)
LYMPHOCYTES # BLD: 1.4 K/UL (ref 0.8–3.5)
LYMPHOCYTES NFR BLD: 10 % (ref 12–49)
MAGNESIUM SERPL-MCNC: 1.7 MG/DL (ref 1.6–2.4)
MCH RBC QN AUTO: 30.2 PG (ref 26–34)
MCHC RBC AUTO-ENTMCNC: 29.2 G/DL (ref 30–36.5)
MCV RBC AUTO: 103.6 FL (ref 80–99)
MONOCYTES # BLD: 0.9 K/UL (ref 0–1)
MONOCYTES NFR BLD: 7 % (ref 5–13)
NEUTS SEG # BLD: 10.7 K/UL (ref 1.8–8)
NEUTS SEG NFR BLD: 81 % (ref 32–75)
NRBC # BLD: 0 K/UL (ref 0–0.01)
NRBC BLD-RTO: 0 PER 100 WBC
PLATELET # BLD AUTO: 320 K/UL (ref 150–400)
PMV BLD AUTO: 9.5 FL (ref 8.9–12.9)
POTASSIUM SERPL-SCNC: 4.4 MMOL/L (ref 3.5–5.1)
PROT SERPL-MCNC: 6.8 G/DL (ref 6.4–8.2)
PROTHROMBIN TIME: 19 SEC (ref 9–11.1)
RBC # BLD AUTO: 2.81 M/UL (ref 3.8–5.2)
SERVICE CMNT-IMP: ABNORMAL
SODIUM SERPL-SCNC: 139 MMOL/L (ref 136–145)
WBC # BLD AUTO: 13.1 K/UL (ref 3.6–11)

## 2019-02-23 PROCEDURE — 74011250637 HC RX REV CODE- 250/637: Performed by: STUDENT IN AN ORGANIZED HEALTH CARE EDUCATION/TRAINING PROGRAM

## 2019-02-23 PROCEDURE — 85025 COMPLETE CBC W/AUTO DIFF WBC: CPT

## 2019-02-23 PROCEDURE — 83735 ASSAY OF MAGNESIUM: CPT

## 2019-02-23 PROCEDURE — 77030029684 HC NEB SM VOL KT MONA -A

## 2019-02-23 PROCEDURE — 85610 PROTHROMBIN TIME: CPT

## 2019-02-23 PROCEDURE — 82962 GLUCOSE BLOOD TEST: CPT

## 2019-02-23 PROCEDURE — 74011636637 HC RX REV CODE- 636/637: Performed by: FAMILY MEDICINE

## 2019-02-23 PROCEDURE — 74011250637 HC RX REV CODE- 250/637: Performed by: INTERNAL MEDICINE

## 2019-02-23 PROCEDURE — 80053 COMPREHEN METABOLIC PANEL: CPT

## 2019-02-23 PROCEDURE — 36415 COLL VENOUS BLD VENIPUNCTURE: CPT

## 2019-02-23 RX ORDER — INSULIN LISPRO 100 [IU]/ML
INJECTION, SOLUTION INTRAVENOUS; SUBCUTANEOUS
Status: DISCONTINUED | OUTPATIENT
Start: 2019-02-23 | End: 2019-02-23 | Stop reason: HOSPADM

## 2019-02-23 RX ORDER — PANTOPRAZOLE SODIUM 40 MG/1
TABLET, DELAYED RELEASE ORAL
Qty: 90 TAB | Refills: 0 | Status: SHIPPED | OUTPATIENT
Start: 2019-02-23 | End: 2019-02-28 | Stop reason: SDUPTHER

## 2019-02-23 RX ADMIN — INSULIN LISPRO 2 UNITS: 100 INJECTION, SOLUTION INTRAVENOUS; SUBCUTANEOUS at 08:27

## 2019-02-23 RX ADMIN — ISOSORBIDE MONONITRATE 120 MG: 60 TABLET ORAL at 08:27

## 2019-02-23 RX ADMIN — INSULIN LISPRO 3 UNITS: 100 INJECTION, SOLUTION INTRAVENOUS; SUBCUTANEOUS at 13:05

## 2019-02-23 RX ADMIN — Medication 10 ML: at 13:06

## 2019-02-23 RX ADMIN — CARVEDILOL 37.5 MG: 12.5 TABLET, FILM COATED ORAL at 08:27

## 2019-02-23 RX ADMIN — ALLOPURINOL 100 MG: 100 TABLET ORAL at 08:27

## 2019-02-23 RX ADMIN — AMLODIPINE BESYLATE 5 MG: 5 TABLET ORAL at 08:27

## 2019-02-23 RX ADMIN — PANTOPRAZOLE SODIUM 40 MG: 40 TABLET, DELAYED RELEASE ORAL at 06:46

## 2019-02-23 RX ADMIN — INSULIN LISPRO 3 UNITS: 100 INJECTION, SOLUTION INTRAVENOUS; SUBCUTANEOUS at 00:00

## 2019-02-23 RX ADMIN — CLOPIDOGREL 75 MG: 75 TABLET, FILM COATED ORAL at 08:27

## 2019-02-23 RX ADMIN — OXYCODONE HYDROCHLORIDE AND ACETAMINOPHEN 1 TABLET: 7.5; 325 TABLET ORAL at 08:36

## 2019-02-23 RX ADMIN — Medication 10 ML: at 06:03

## 2019-02-23 NOTE — PROGRESS NOTES
Spiritual Care Partner Volunteer visited patient in 5 Med Surg on 2/23/19. Documented by: Jitendra Ghotra., MS., [address] ([id number])

## 2019-02-23 NOTE — PROGRESS NOTES
[facility]  
Daily Progress Note Date: 2/23/2019 Assessment/Plan:  
Jackie Staples is a 66 y.o. female with PMH of CAD, 4 stents, ESRD on dialysis, COPD, T2DM, and obesity who is admitted for chest pain in the setting of stent placement 2/19/19 and hx of DVT on coumadin. 24 Hour Events: no acute events overnight, got dialysis yesterday, pain improved but still present  
  
Chest Pain - in the setting of stent placement 2/19/19 and hx of DVT, pt on coumadin 3mg every day except for Fridays. INR 1.6, Trop neg x1, EKG WNL.  
-Admit to telemetry 
- trops negative x3  
-VQ Scan low prob for PE  
-Consult cards most likely costochondritis vs GI  
-Continue home Imdur 120mg daily 
-increase Protonix to BID s/p GI cocktail, follow up with GI outpt  
  
HFpEF - Last ECHO 12/13/17 EF 60% -Repeat ECHO showing EF 66-70% unchanged from previous study   
  
ESRD - Pt on dialysis MWF 
-Nephrology consulted  
-  dialysis on 2/22/19  
-Daily CMP, Mag 
  
T2DM - home regimen is insulin aspart protamine/insulin aspart 70/30 mix nightly 10-40u as needed, pt sometimes takes 0u, sometimes 20u -SSI with hypoglycemic protocol 
-POC glucose ACHS 
-Will adjust as needed 
  
COPD - Stable. On 2L O2 at home, stable on 2L NC in ED 
-O2 as needed to keep O2 88-94% 
-Albuterol PRN 
  
Anemia - Hgb 8.9 POA (Bl 8-9) -Daily CBC 
  
Gout 
-Continue home Allopurinol 100mg daily 
  
HLD  
-Continue home Lipitor 80mg nightly 
  
GERD  
-Continue home Protonix 40mg daily 
  
Obesity - Body mass index is 43.48 kg/m². -Encouraging lifestyle modifications 
  
Chronic Pain  
-Continue home Percocet 7.5 BID PRN 
  
  
FEN/GI - regular Activity - With assistance DVT prophylaxis - Warfarin GI prophylaxis -  Protonix Disposition - Plan to d/c TBD. PT/OT. 
  
CODE STATUS:  Full Patient was discussed with Dr. Chi Ya MD 
Family Medicine Resident 2/23/2019 9:22 PM 
  
 
 
 Subjective No acute events overnight. Patient  Denies chills, headaches,  shortness of breath, palpitations, abdominal pain, nausea and vomiting, and LE edema. Inpatient Medications Current Facility-Administered Medications Medication Dose Route Frequency  insulin lispro (HUMALOG) injection   SubCUTAneous AC&HS  sodium chloride (NS) flush 5-40 mL  5-40 mL IntraVENous Q8H  
 sodium chloride (NS) flush 5-40 mL  5-40 mL IntraVENous PRN  
 amLODIPine (NORVASC) tablet 5 mg  5 mg Oral DAILY  atorvastatin (LIPITOR) tablet 80 mg  80 mg Oral QHS  carvedilol (COREG) tablet 37.5 mg  37.5 mg Oral BID WITH MEALS  
 allopurinol (ZYLOPRIM) tablet 100 mg  100 mg Oral DAILY  oxyCODONE-acetaminophen (PERCOCET 7.5) 7.5-325 mg per tablet 1 Tab  1 Tab Oral BID PRN  
 isosorbide mononitrate ER (IMDUR) tablet 120 mg  120 mg Oral DAILY  glucose chewable tablet 16 g  4 Tab Oral PRN  
 dextrose (D50W) injection syrg 12.5-25 g  25-50 mL IntraVENous PRN  
 glucagon (GLUCAGEN) injection 1 mg  1 mg IntraMUSCular PRN  
 albuterol (PROVENTIL VENTOLIN) nebulizer solution 2.5 mg  2.5 mg Nebulization Q4H PRN  
 clopidogrel (PLAVIX) tablet 75 mg  75 mg Oral DAILY  pantoprazole (PROTONIX) tablet 40 mg  40 mg Oral ACB&D  
 doxercalciferol (HECTOROL) 4 mcg/2 mL injection 1 mcg  1 mcg IntraVENous DIALYSIS MON, WED & FRI  
 epoetin charlie (EPOGEN;PROCRIT) injection 10,000 Units  10,000 Units IntraVENous DIALYSIS MON, WED & FRI  Warfarin - Pharmacy to dose   Other Rx Dosing/Monitoring Allergies Allergies Allergen Reactions  Contrast Dye [Iodine] Anaphylaxis Tolerates when pre medicated w/ IV solumedrol and benadryl prior to procedure  Levaquin [Levofloxacin] Nausea and Vomiting  Morphine Hives and Itching Objective Vitals: 
Patient Vitals for the past 8 hrs: 
 Temp Pulse Resp BP SpO2  
02/23/19 0454 98.7 °F (37.1 °C) 62 18 119/60 92 % 02/23/19 0104 98.1 °F (36.7 °C) 65 18 131/68 96 % I/O: 
 
Intake/Output Summary (Last 24 hours) at 2/23/2019 0555 Last data filed at 2/22/2019 2045 Gross per 24 hour Intake  Output 3000 ml Net -3000 ml Last shift: 
  02/22 1901 - 02/23 0700 In: -  
Out: 3000 Last 3 shifts: 
  02/21 0701 - 02/22 1900 In: 107.5 [I.V.:107.5] Out: - Physical Exam: 
 
General: No acute distress. Alert. Cooperative. Obese. Head: Normocephalic. Atraumatic. Respiratory: CTAB. No w/r/r/c.  
Cardiovascular: CP reproducible with palpation. RRR. Normal S1,S2. No m/r/g. Pulses 2+ throughout. GI: + bowel sounds. Nontender. No rebound tenderness or guarding. Nondistended. Extremities: Nonpitting edema. Fistula LUE. No palpable cord. Musculoskeletal: Full ROM in all extremities. Neuro: CN II-XII grossly intact. Strength 5/5 in all extremities. Sensation intact in all extremities. Skin: No rashes. No ulcers.   
     
 
 
Laboratory Data Recent Results (from the past 12 hour(s)) GLUCOSE, POC Collection Time: 02/23/19 12:47 AM  
Result Value Ref Range Glucose (POC) 223 (H) 65 - 100 mg/dL Performed by Gadiel Cramer (PCT) Imaging Hospital Problems: 
Hospital Problems  Date Reviewed: 2/13/2019 Codes Class Noted POA Chest pain ICD-10-CM: R07.9 ICD-9-CM: 786.50  2/22/2019 Unknown (HFpEF) heart failure with preserved ejection fraction (HCC) ICD-10-CM: I50.30 ICD-9-CM: 428.9  9/23/2018 Yes GERD (gastroesophageal reflux disease) ICD-10-CM: K21.9 ICD-9-CM: 530.81  Unknown Yes Gout ICD-10-CM: M10.9 ICD-9-CM: 274.9  Unknown Yes COPD, severe ([facility] [address] ICD-10-CM: J44.9 ICD-9-CM: 682  6/21/2017 Yes DM (diabetes mellitus), type 2 with renal complications (HCC) (Chronic) ICD-10-CM: E11.29 ICD-9-CM: 250.40  8/9/2013 Yes HTN (hypertension) (Chronic) ICD-10-CM: I10 
ICD-9-CM: 401.9  10/1/2012 Yes  Morbid obesity (Chronic) ICD-10-CM: E66.01 
 ICD-9-CM: 278.01  10/1/2012 Yes

## 2019-02-23 NOTE — PROGRESS NOTES
[facility] Dosing Services: 2/23/2019 Consult for Warfarin Dosing by Pharmacy by Dr. Clem Celis Consult provided for this 66 y.o.  female , for indication of Thromboembolism (Due to Prosthetic Heart Valves) . Pt has Hx of CAD s/p RCA stent 2/19/19 PTA regimen is 3 mg daily except Friday Day of Therapy Continue from home Dose to achieve an INR goal of 2-3 Order entered for  Warfarin 3 mg to be given today at 18:00. Significant drug interactions: clopidogrel Previous dose given Warfarin 3 mg on 2/22/2019 PT/INR Lab Results Component Value Date/Time INR 1.9 (H) 02/23/2019 06:00 AM  
  
Platelets Lab Results Component Value Date/Time PLATELET 625 54/09/5959 06:00 AM  
  
H/H Lab Results Component Value Date/Time HGB 8.5 (L) 02/23/2019 06:00 AM  
  
 
Pharmacist will follow daily and will provide subsequent Warfarin dosing based on clinical status.  
Andree Gomez, Pharmacist

## 2019-02-23 NOTE — DISCHARGE INSTRUCTIONS
HOME DISCHARGE INSTRUCTIONS    Rachael [place] / [id number] : 1955    Admission date: 2019 Discharge date: 2019     Please bring this form with you to show your care provider at your follow-up appointment. Primary care provider:  Eugene Gordillo DO    Discharging provider:  Anna Marie Baker MD  - Family Medicine Resident  Dr. Anali Bates - Attending, Family Medicine     You have been admitted to the hospital with the following diagnoses:    ACUTE DIAGNOSES:  Chest pain [R07.9]  . . . . . . . . . . . . . . . . . . . . . . . . . . . . . . . . . . . . . . . . . . . . . . . . . . . . . . . . . . . . . . . . . . . . . . . Annamanas Luna FOLLOW-UP CARE RECOMMENDATIONS:  START TAKING PROTONIX 40mg twice a day for heartburn     Appointments  Follow-up Information     Follow up With Specialties Details Why Contact Info    Lilo Levine DO Family Practice  please make appointment as soon as possible  [address] [place] [address]  [phone]      Gastroenterology   In 1 week  Please call to make an appointment with your GI physician           Follow-up tests needed: none    Pending test results: At the time of your discharge the following test results are still pending: none  Please make sure you review these results with your outpatient follow-up provider(s). Specific symptoms to watch for: chest pain, shortness of breath, fever, chills, nausea, vomiting, diarrhea, change in mentation, falling, weakness, bleeding. DIET/what to eat:  Renal Diet    ACTIVITY:  Activity as tolerated    Wound care: none    Equipment needed:  none    What to do if new or unexpected symptoms occur? If you experience any of the above symptoms (or should other concerns or questions arise after discharge) please call your primary care physician. Return to the emergency room if you cannot get hold of your doctor. · It is very important that you keep your follow-up appointment(s).   · Please bring discharge papers, medication list (and/or medication bottles) to your follow-up appointments for review by your outpatient provider(s). · Please check the list of medications and be sure it includes every medication (even non-prescription medications) that your provider wants you to take. · It is important that you take the medication exactly as they are prescribed. · Keep your medication in the bottles provided by the pharmacist and keep a list of the medication names, dosages, and times to be taken in your wallet. · Do not take other medications without consulting your doctor. · If you have any questions about your medications or other instructions, please talk to your nurse or care provider before you leave the hospital.     Information obtained by:     I understand that if any problems occur once I am at home I am to contact my physician. These instructions were explained to me and I had the opportunity to ask questions. I understand and acknowledge receipt of the instructions indicated above.                                                                                                                                                Physician's or R.N.'s Signature                                                                  Date/Time                                                                                                                                              Patient or Representative Signature                                                          Date/Time

## 2019-02-23 NOTE — PROGRESS NOTES
Problem: Falls - Risk of 
Goal: *Absence of Falls Document Amanda Cano Fall Risk and appropriate interventions in the flowsheet. Outcome: Progressing Towards Goal 
Fall Risk Interventions: 
Mobility Interventions: Bed/chair exit alarm, Patient to call before getting OOB, PT Consult for mobility concerns Medication Interventions: Patient to call before getting OOB, Teach patient to arise slowly

## 2019-02-23 NOTE — PROGRESS NOTES
Bedside and Verbal shift change report given to CRISTINA Mendoza (oncoming nurse) by Kelly Farrell (offgoing nurse). Report included the following information SBAR, Kardex, Procedure Summary, Intake/Output, MAR, Recent Results and Med Rec Status.

## 2019-02-23 NOTE — DIALYSIS
[facility]                         [phone] Vitals Pre Post Assessment Pre Post  
/72 132/78 LOC Alert, oriented Alert,oriented HR 64 77 Lungs clear clear Temp 36.9 36.9 Cardiac Reg Reg Resp 18 18 Skin intact intact Weight 137.4kg 134.4kg Edema gen gen Pain denies denies Orders Duration: Start: 1428 End: 2045 Total: 3.5hrs Blood flow: 350 ml/min Dialysate flow: 600 ml/min Dialyzer: Revaclear K Bath: 2 Ca Bath: 2.5 Na / Bicarb: 140/35 Target Fluid Removal: 3000ml Access Type & Location: Left FA fistula, cannulated with 15 g. 1 \" needles in antegrade fashion x 2. Good flows and acceptable pressures at Qb350. At EOT, all possible blood returned, sites held until hemostasis achieved. Dsg applied. Pt stable. Comments:                    No issues. Labs Obtained/Reviewed Critical Results Called Reviewed AM labs Meds Given Name Dose Route  
none Total Liters Process: 69.1L Net Fluid Removed: 3000 ml Comments  Tolerated well. VSS throughout. Report given to floor RN. Pt to be discharged home now that HD complete.   CHRISTIAN

## 2019-02-23 NOTE — PROGRESS NOTES
Discharge medications reviewed with patient and spouse and appropriate educational materials and side effects teaching were provided. I have reviewed discharge instructions with the patient and spouse. The patient and spouse verbalized understanding. Peripheral IV removed and patient provided with prescription for Protonix.

## 2019-02-23 NOTE — PROGRESS NOTES
Bedside and Verbal shift change report given to [address] [place] [address] Drive (oncoming nurse) by Renée Boswell RN (offgoing nurse). Report included the following information SBAR, Kardex, MAR, Accordion and Recent Results.

## 2019-02-23 NOTE — DISCHARGE SUMMARY
[address] [facility] [address] [place] [address]   Office [phone], Fax [phone]        Discharge Summary     Patient: Cody Kelley       MRN: [id number]       YOB: 1955       Age: 66 y.o. Date of admission:  2/21/2019    Date of discharge:  2/23/2019    Primary care provider:  Lindsey Cantu DO     Admitting provider:  Matt Torres MD    Discharging provider(s): Chet Whitatker MD - Family Medicine Resident  Dr. Moe Cam MD - [facility] Medicine Attending     Consultations  · Cards   · Nephrology     Procedures  · ECHO     Discharge destination: home. The patient is stable for discharge. Admission diagnosis  Chest pain [R07.9]      Final discharge diagnoses and brief hospital course  Cody Kelley is a 66 y.o. female with PMH of CAD, 4 stents, ESRD on dialysis, COPD, T2DM, and obesity who presents to the ER complaining of chest pain. Pt complaining of 1 day of chest pain \"tightness\", 12/10 intensity, not relieved by nitroglycerin. Pt had a recent stent placed 2/19/19, was started on Plavix, and restarted her coumadin 2/20/19 for hx of DVT. On dialysis MWF for ESRD. On 2L O2 at home for COPD. Pt also reports a hx of \"silent\" MI in 2012. Pt denies any dizziness, abdominal pain, LOC, trauma, numbness, or weakness. .       In the ER, vital signs were remarkable for O2 99% on 2L NC. Labs were remarkable for WBC 11.8, Hgb 8.9 (Bl 8-9), Cr 6.98, Mag 1.5, and INR 1.6.   -CXR showed chronic bilateral opacities, resolution of pulmonary edema. -EKG showed SR with occasional PVCs. -Pt was treated with dilaudid 1mg x2.       Conditions treated during this hospitalization:   Chest Pain/SOB - in the setting of stent placement 2/19/19 and hx of DVT, pt on coumadin 3mg every day except for Fridays.  INR 1.6, Trop neg x3, EKG WNL,VQ Scan low prob for PE, Consult cards noting most likely costochondritis vs GI, Continued home Imdur 120mg daily, Increase Protonix to BID s/p GI cocktail, follow up with GI outpt      HFpEF - Last ECHO 12/13/17 EF 60%  -Repeat ECHO showing EF 66-70% unchanged from previous study       ESRD - Pt on dialysis MWF  -Nephrology consulted, received dialysis on 2/22/19     T2DM - home regimen is insulin aspart protamine/insulin aspart 70/30 mix nightly 10-40u as needed, pt sometimes takes 0u, sometimes 20u  -SSI with hypoglycemic protocol during admission      COPD - Stable. On 2L O2 at home, stable on 2L NC in ED  -Albuterol PRN     Anemia - Hgb 8.9 POA (Bl 8-9)  -Daily CBC     Gout  -Continue home Allopurinol 100mg daily     HLD   -Continue home Lipitor 80mg nightly     GERD   - Protonix 40mg BID     Obesity - Body mass index is 43.48 kg/m².   -Encouraging lifestyle modifications     Chronic Pain   -Continue home Percocet 7.5 BID PRN      Labs/Imaging Needed on follow up: none    Pending test results: At the time of your discharge the following test results are still pending: none  Please make sure you review these results with your outpatient follow-up provider(s). Specific symptoms to watch for: chest pain, shortness of breath, fever, chills, nausea, vomiting, diarrhea, change in mentation, falling, weakness, bleeding. DIET/what to eat:  Renal Diet    ACTIVITY:  Activity as tolerated    Wound care: none    Equipment needed:  none    Follow-up Care:    Follow-up Information     Follow up With Specialties Details Why Contact Gil Chance DO Family Practice  please make appointment as soon as possible  [address]  [address] [place] [address] Rd  [phone]      Gastroenterology   In 1 week  Please call to make an appointment with your GI physician          Physical examination at discharge  Visit Vitals  /62 (BP 1 Location: Right arm, BP Patient Position: At rest)   Pulse 75   Temp 98.7 °F (37.1 °C)   Resp 18   Ht 5' 10\" (1.778 m)   Wt 303 lb (137.4 kg)   SpO2 96%   BMI 43.48 kg/m²      Physical Examination:   General: No acute distress. Alert. Cooperative. Obese. Head: Normocephalic. Atraumatic. Respiratory: CTAB. No w/r/r/c.   Cardiovascular: CP reproducible with palpation. RRR. Normal S1,S2. No m/r/g. Pulses 2+ throughout. GI: + bowel sounds. Nontender. No rebound tenderness or guarding. Nondistended. Extremities: Nonpitting edema. Fistula LUE. No palpable cord. Musculoskeletal: Full ROM in all extremities. Neuro: CN II-XII grossly intact. Strength 5/5 in all extremities. Sensation intact in all extremities. Skin: No rashes.  No ulcers.              Recent Results (from the past 24 hour(s))   ECHO ADULT COMPLETE    Collection Time: 02/22/19  2:36 PM   Result Value Ref Range    LA Volume 51.36 22 - 52 mL    Ao Root D 3.19 cm    LA Vol Index 20.62 16 - 28 ml/m2    Aortic Valve Systolic Peak Velocity 915.57 cm/s    Aortic Valve Area by Continuity of Peak Velocity 2.1 cm2    AoV PG 9.0 mmHg    LVIDd 4.41 3.9 - 5.3 cm    LVPWd 0.77 0.6 - 0.9 cm    LVIDs 2.93 cm    IVSd 0.81 0.6 - 0.9 cm    LVOT d 1.88 cm    LVOT Peak Velocity 112.12 cm/s    LVOT Peak Gradient 5.0 mmHg    MV A Adeel 99.09 cm/s    MV E Adeel 1.13 cm/s    MV E/A 0.01     Left Atrium to Aortic Root Ratio 1.06     RVIDd 3.03 cm    LA Vol 4C 38.06 22 - 52 mL    LA Vol 2C 53.87 (A) 22 - 52 mL    LV Mass .5 67 - 162 g    LV Mass AL Index 48.4 43 - 95 g/m2    Mitral Regurgitant Peak Velocity 278.40 cm/s    Mitral Valve E Wave Deceleration Time 268.9 ms    Left Atrium Major Axis 3.39 cm    Triscuspid Valve Regurgitation Peak Gradient 26.1 mmHg    Pulmonic Valve Max Velocity 108.55 cm/s    TR Max Velocity 255.28 cm/s    LA Vol Index 21.63 16 - 28 ml/m2    LA Vol Index 15.28 16 - 28 ml/m2    MR Peak Gradient 31.0 mmHg    PV peak gradient 4.7 mmHg   GLUCOSE, POC    Collection Time: 02/22/19  5:15 PM   Result Value Ref Range    Glucose (POC) 353 (H) 65 - 100 mg/dL    Performed by [address] [place] [address], POC    Collection Time: 02/23/19 12:47 AM   Result Value Ref Range Glucose (POC) 223 (H) 65 - 100 mg/dL    Performed by [facility] ([facility])    METABOLIC PANEL, COMPREHENSIVE    Collection Time: 02/23/19  6:00 AM   Result Value Ref Range    Sodium 139 136 - 145 mmol/L    Potassium 4.4 3.5 - 5.1 mmol/L    Chloride 101 97 - 108 mmol/L    CO2 31 21 - 32 mmol/L    Anion gap 7 5 - 15 mmol/L    Glucose 148 (H) 65 - 100 mg/dL    BUN 37 (H) 6 - 20 MG/DL    Creatinine 5.41 (H) 0.55 - 1.02 MG/DL    BUN/Creatinine ratio 7 (L) 12 - 20      GFR est AA 10 (L) >60 ml/min/1.73m2    GFR est non-AA 8 (L) >60 ml/min/1.73m2    Calcium 8.2 (L) 8.5 - 10.1 MG/DL    Bilirubin, total 0.3 0.2 - 1.0 MG/DL    ALT (SGPT) 9 (L) 12 - 78 U/L    AST (SGOT) 10 (L) 15 - 37 U/L    Alk. phosphatase 106 45 - 117 U/L    Protein, total 6.8 6.4 - 8.2 g/dL    Albumin 2.4 (L) 3.5 - 5.0 g/dL    Globulin 4.4 (H) 2.0 - 4.0 g/dL    A-G Ratio 0.5 (L) 1.1 - 2.2     CBC WITH AUTOMATED DIFF    Collection Time: 02/23/19  6:00 AM   Result Value Ref Range    WBC 13.1 (H) 3.6 - 11.0 K/uL    RBC 2.81 (L) 3.80 - 5.20 M/uL    HGB 8.5 (L) 11.5 - 16.0 g/dL    HCT 29.1 (L) 35.0 - 47.0 %    .6 (H) 80.0 - 99.0 FL    MCH 30.2 26.0 - 34.0 PG    MCHC 29.2 (L) 30.0 - 36.5 g/dL    RDW 14.6 (H) 11.5 - 14.5 %    PLATELET 572 406 - 165 K/uL    MPV 9.5 8.9 - 12.9 FL    NRBC 0.0 0  WBC    ABSOLUTE NRBC 0.00 0.00 - 0.01 K/uL    NEUTROPHILS 81 (H) 32 - 75 %    LYMPHOCYTES 10 (L) 12 - 49 %    MONOCYTES 7 5 - 13 %    EOSINOPHILS 0 0 - 7 %    BASOPHILS 0 0 - 1 %    IMMATURE GRANULOCYTES 2 (H) 0.0 - 0.5 %    ABS. NEUTROPHILS 10.7 (H) 1.8 - 8.0 K/UL    ABS. LYMPHOCYTES 1.4 0.8 - 3.5 K/UL    ABS. MONOCYTES 0.9 0.0 - 1.0 K/UL    ABS. EOSINOPHILS 0.0 0.0 - 0.4 K/UL    ABS. BASOPHILS 0.0 0.0 - 0.1 K/UL    ABS. IMM.  GRANS. 0.2 (H) 0.00 - 0.04 K/UL    DF AUTOMATED     MAGNESIUM    Collection Time: 02/23/19  6:00 AM   Result Value Ref Range    Magnesium 1.7 1.6 - 2.4 mg/dL   PROTHROMBIN TIME + INR    Collection Time: 02/23/19  6:00 AM   Result Value Ref Range    INR 1.9 (H) 0.9 - 1.1      Prothrombin time 19.0 (H) 9.0 - 11.1 sec   GLUCOSE, POC    Collection Time: 02/23/19  6:01 AM   Result Value Ref Range    Glucose (POC) 156 (H) 65 - 100 mg/dL    Performed by Raleigh Peng (PCT)    GLUCOSE, POC    Collection Time: 02/23/19 12:10 PM   Result Value Ref Range    Glucose (POC) 200 (H) 65 - 100 mg/dL    Performed by Rekha Dhillon (PCT)          Current Discharge Medication List      CONTINUE these medications which have CHANGED    Details   pantoprazole (PROTONIX) 40 mg tablet TAKE 1 TABLET BY MOUTH TWICE A DAY FOR HEARTBURN  Qty: 90 Tab, Refills: 0    Associated Diagnoses: Gastroesophageal reflux disease, esophagitis presence not specified         CONTINUE these medications which have NOT CHANGED    Details   amLODIPine (NORVASC) 10 mg tablet Take 10 mg by mouth daily. !! warfarin (COUMADIN) 3 mg tablet Take 3 mg by mouth Every Tues, Thur & Sat.      !! warfarin (COUMADIN) 3 mg tablet Take 3 mg by mouth Every Mon, Wed and Sat. clopidogrel (PLAVIX) 75 mg tab Take 1 Tab by mouth daily. Qty: 30 Tab, Refills: 11      diclofenac (VOLTAREN) 1 % gel Apply 2 g to affected area four (4) times daily. As needed for right knee pain  Qty: 100 g, Refills: 5    Associated Diagnoses: Chronic pain of right knee      oxyCODONE-acetaminophen (PERCOCET) 7.5-325 mg per tablet Take 1 Tab by mouth two (2) times daily as needed for Pain. Max Daily Amount: 2 Tabs. Qty: 60 Tab, Refills: 0    Associated Diagnoses: Chronic pain syndrome      insulin aspart protamine/insulin aspart (NOVOLOG MIX 70-30 U-100 INSULN) 100 unit/mL (70-30) injection 10-40 Units by SubCUTAneous route nightly as needed (BG > 160).       isosorbide mononitrate ER (IMDUR) 120 mg CR tablet TAKE 1 TABLET BY MOUTH ONCE DAILY  Qty: 90 Tab, Refills: 0    Comments: PATIENT NEED ASAP      allopurinol (ZYLOPRIM) 100 mg tablet TAKE 1 TABLET BY MOUTH EVERY DAY  Qty: 30 Tab, Refills: 5    Associated Diagnoses: Acute gout due to renal impairment involving left foot      nitroglycerin (NITROSTAT) 0.3 mg SL tablet 1 Tab by SubLINGual route every five (5) minutes as needed for Chest Pain. Qty: 1 Bottle, Refills: 1    Associated Diagnoses: Atherosclerosis of native coronary artery of native heart with stable angina pectoris ([facility] Angina, class III (HCC)      carvedilol (COREG) 25 mg tablet Take 1.5 Tabs by mouth two (2) times daily (with meals). Qty: 180 Tab, Refills: 3      albuterol (PROAIR HFA) 90 mcg/actuation inhaler Take 2 Puffs by inhalation every four (4) hours as needed for Wheezing.      ergocalciferol (VITAMIN D2) 50,000 unit capsule Take 50,000 Units by mouth every Tuesday. atorvastatin (LIPITOR) 80 mg tablet Take 80 mg by mouth nightly. !! - Potential duplicate medications found. Please discuss with provider. Admission imaging studies:    Results from Hospital Encounter encounter on 02/21/19   XR CHEST PA LAT    Narrative EXAM: XR CHEST PA LAT    INDICATION: Chest tightness started this morning and has increased throughout  the day. Intermittent shortness of breath. Diastolic heart failure, diabetes,  COPD. Chronic interstitial lung disease. COMPARISON: Portable chest views on 6/13/2018. TECHNIQUE: PA and lateral chest views    FINDINGS: Cardiac monitoring wires overlie the thorax. Cardiac size and aortic  arch are within normal limits. Calcified mediastinal lymph nodes are unchanged. The pulmonary vasculature is within normal limits. Bilateral heterogeneous pulmonary opacities are unchanged. No evidence of  pulmonary edema. No pneumothorax. Osteopenia is unchanged. Upper abdomen is not  well evaluated. Impression IMPRESSION:    Chronic bilateral opacities. Resolution of pulmonary edema. No pneumothorax. Old  granulomatous disease. No results found for this or any previous visit.            Results from [place] encounter on 06/26/18   CT ABD PELV WO CONT    Narrative EXAM:  CT ABD PELV WO CONT    INDICATION: abd pain  constipation    COMPARISON: 2017    CONTRAST:  None. TECHNIQUE:   Thin axial images were obtained through the abdomen and pelvis. Coronal and  sagittal reconstructions were generated. Oral contrast was not administered. CT  dose reduction was achieved through use of a standardized protocol tailored for  this examination and automatic exposure control for dose modulation. The absence of intravenous contrast material reduces the sensitivity for  evaluation of the solid parenchymal organs of the abdomen. FINDINGS:   LUNG BASES: Bibasilar fibrosis. INCIDENTALLY IMAGED HEART AND MEDIASTINUM: Unremarkable. LIVER: No mass or biliary dilatation. GALLBLADDER: Cholecystectomy. SPLEEN: Calcified granulomata. PANCREAS: No mass or ductal dilatation. ADRENALS: Unremarkable. KIDNEYS/URETERS: Left renal cysts. STOMACH: Unremarkable. SMALL BOWEL: No dilatation or wall thickening. COLON: Large amount of stool in the colon. APPENDIX: Surgically removed  PERITONEUM: No ascites or pneumoperitoneum. RETROPERITONEUM: No lymphadenopathy or aortic aneurysm. REPRODUCTIVE ORGANS: Surgically removed. URINARY BLADDER: No mass or calculus. BONES: No destructive bone lesion. ADDITIONAL COMMENTS: N/A      Impression IMPRESSION:  Constipation. Interstitial lung disease.                  No procedure found.      -------------------------------------------------------------------------------------------------------------------    Chronic Diagnoses:    Problem List as of 2/23/2019 Date Reviewed: 2/13/2019          Codes Class Noted - Resolved    Chest pain ICD-10-CM: R07.9  ICD-9-CM: 786.50  2/22/2019 - Present        S/P cardiac cath ICD-10-CM: Z98.890  ICD-9-CM: V45.89  2/19/2019 - Present    Overview Signed 2/19/2019  3:05 PM by Elisa Ricketts NP     2/19/19:  Successful  of RCA             Unstable angina ([facility] ICD-10-CM: I20.0  ICD-9-CM: 411.1  1/18/2019 - Present        (HFpEF) heart failure with preserved ejection fraction [facility]) ICD-10-CM: I50.30  ICD-9-CM: 428.9  9/23/2018 - Present        ESRD on hemodialysis [facility]) ICD-10-CM: N18.6, Z99.2  ICD-9-CM: 585.6, V45.11  6/23/2018 - Present        Limited mobility ICD-10-CM: Z74.09  ICD-9-CM: V49.89  6/21/2018 - Present        Hx of deep venous thrombosis ICD-10-CM: Z86.718  ICD-9-CM: V12.51  5/30/2018 - Present        Diabetic gastroparesis (HCC) ICD-10-CM: E11.43, K31.84  ICD-9-CM: 250.60, 536.3  Unknown - Present        GERD (gastroesophageal reflux disease) ICD-10-CM: K21.9  ICD-9-CM: 530.81  Unknown - Present        Rheumatoid arteritis ICD-10-CM: I00  ICD-9-CM: 447.8  Unknown - Present        DM type 2 causing neurological disease (HCC) ICD-10-CM: E11.49  ICD-9-CM: 250.60  Unknown - Present        DM type 2 causing renal disease (HCC) ICD-10-CM: E11.29  ICD-9-CM: 250.40  Unknown - Present        DM type 2 causing vascular disease (HCC) ICD-10-CM: E11.59  ICD-9-CM: 250.70, 443.81  Unknown - Present        Gout ICD-10-CM: M10.9  ICD-9-CM: 274.9  Unknown - Present        ILD (interstitial lung disease) ([facility] ICD-10-CM: J84.9  ICD-9-CM: 560  8/20/2017 - Present        Warfarin anticoagulation ICD-10-CM: Z79.01  ICD-9-CM: V58.61  8/20/2017 - Present        COPD, severe ([facility] ICD-10-CM: J44.9  ICD-9-CM: 543  6/21/2017 - Present        DM (diabetes mellitus), type 2 with renal complications (HCC) (Chronic) ICD-10-CM: E11.29  ICD-9-CM: 250.40  8/9/2013 - Present        Normocytic anemia (Chronic) ICD-10-CM: D64.9  ICD-9-CM: 285.9  5/26/2013 - Present        HTN (hypertension) (Chronic) ICD-10-CM: I10  ICD-9-CM: 401.9  10/1/2012 - Present        Morbid obesity (Chronic) ICD-10-CM: E66.01  ICD-9-CM: 278.01  10/1/2012 - Present        Esophageal reflux ICD-10-CM: K21.9  ICD-9-CM: 530.81  10/1/2012 - Present        Gastroparesis ICD-10-CM: K31.84  ICD-9-CM: 536.3  10/1/2012 - Present        Pulmonary HTN (Chronic) ICD-10-CM: I27.20  ICD-9-CM: 416.8  3/21/2012 - Present        CAD (coronary Artery Disease) Native Artery (Chronic) ICD-10-CM: I25.10  ICD-9-CM: 414.01  2/16/2011 - Present    Overview Addendum 10/5/2012  7:33 AM by PRASHANT Tavares     [place] 2004  1v CAD by cath - PCI OM with SAL  Cath 5/2004 - patent stent, no new disease  Lexiscan cardiolite 12/09- normal perfusion, EF 66%  Cath: 3/19/12: PA 55/30 mean 41, wedge 26, RA 24, EDP 35, LVG 55%, LM normal, LAD normal, LCX - OM1 patent stent, OM2 prox 85% long, % with L to R collaterals                JASEN on CPAP (Chronic) ICD-10-CM: G47.33, Z99.89  ICD-9-CM: 327.23, V46.8  2/16/2011 - Present    Overview Signed 3/21/2012  8:04 AM by PRASHANT Tavares Dr. Hands CPAP             Sleep apnea ICD-10-CM: G47.30  ICD-9-CM: 780.57  2/16/2011 - Present        RESOLVED: COPD (chronic obstructive pulmonary disease) ([facility] ICD-10-CM: J44.9  ICD-9-CM: 496  Unknown - 9/23/2018        RESOLVED: Chest pain ICD-10-CM: R07.9  ICD-9-CM: 786.50  11/21/2017 - 6/15/2018        RESOLVED: Allergic reaction to contrast dye ICD-10-CM: W82.7H0R  ICD-9-CM: 995.27  11/20/2017 - 5/30/2018        RESOLVED: S/P left pulmonary artery pressure sensor implant placement ICD-10-CM: Z95.9  ICD-9-CM: V45.89  8/31/2017 - 5/30/2018    Overview Signed 8/31/2017  9:53 AM by Yaquelin Knox RN     [employer]              RESOLVED: Decreased ambulation status ICD-10-CM: Z74.09  ICD-9-CM: 780.99  6/21/2017 - 9/18/2017        RESOLVED: Acute exacerbation of chronic obstructive pulmonary disease (COPD) ([facility] ICD-10-CM: J44.1  ICD-9-CM: 491.21  3/1/2017 - 8/20/2017        RESOLVED: HCAP (healthcare-associated pneumonia) ICD-10-CM: J18.9  ICD-9-CM: 486  2/28/2017 - 8/20/2017        RESOLVED: CHF exacerbation ([facility] ICD-10-CM: I50.9  ICD-9-CM: 428.0  2/16/2017 - 3/2/2017        RESOLVED: CKD (chronic kidney disease) stage 4, GFR 15-29 ml/min (HCC) (Chronic) ICD-10-CM: N18.4  ICD-9-CM: 585.4  2/10/2017 - 6/23/2018        RESOLVED: Acute and chronic respiratory failure with hypoxia [facility]) ICD-10-CM: J96.21  ICD-9-CM: 518.84, 799.02  2/10/2017 - 8/20/2017        RESOLVED: DVT (deep venous thrombosis) ([facility] ICD-10-CM: I82.409  ICD-9-CM: 453.40  2/2/2017 - 5/30/2018        RESOLVED: Chest pain ICD-10-CM: R07.9  ICD-9-CM: 786.50  11/9/2014 - 11/18/2014        RESOLVED: Wheezing ICD-10-CM: R06.2  ICD-9-CM: 786.07  11/9/2014 - 11/18/2014        RESOLVED: Dyspnea ICD-10-CM: R06.00  ICD-9-CM: 786.09  11/9/2014 - 11/18/2014        RESOLVED: Tobacco abuse ICD-10-CM: Z72.0  ICD-9-CM: 305.1  11/9/2014 - 6/25/2015        RESOLVED: SIRS (systemic inflammatory response syndrome) ([facility] ICD-10-CM: R65.10  ICD-9-CM: 995.90  11/9/2014 - 11/18/2014        RESOLVED: Bacteremia ICD-10-CM: R78.81  ICD-9-CM: 790.7  10/14/2013 - 2/22/2014        RESOLVED: Acute pancreatitis ICD-10-CM: K85.90  ICD-9-CM: 577.0  10/10/2013 - 2/22/2014        RESOLVED: Abdominal pain ICD-10-CM: R10.9  ICD-9-CM: 789.00  10/10/2013 - 2/22/2014        RESOLVED: Pneumonia, organism unspecified(486) ICD-10-CM: J18.9  ICD-9-CM: 486  10/10/2013 - 2/22/2014        RESOLVED: Vitamin D deficiency ICD-10-CM: E55.9  ICD-9-CM: 268.9  8/9/2013 - 5/30/2018        RESOLVED: Angina, class III ([facility] ICD-10-CM: I20.9  ICD-9-CM: 413.9  8/9/2013 - 5/30/2018        RESOLVED: Acute respiratory failure ([facility] ICD-10-CM: J96.00  ICD-9-CM: 518.81  7/12/2013 - 7/17/2013        RESOLVED: Pneumonia, organism unspecified(486) ICD-10-CM: J18.9  ICD-9-CM: 486  7/12/2013 - 7/17/2013        RESOLVED: Chest pain, unspecified ICD-10-CM: R07.9  ICD-9-CM: 786.50  7/12/2013 - 7/17/2013        RESOLVED: DJD (degenerative joint disease) of knee ICD-10-CM: M17.10  ICD-9-CM: 715.36  10/30/2012 - 5/30/2018        RESOLVED: Chronic diastolic heart failure (HCC) (Chronic) ICD-10-CM: I50.32  ICD-9-CM: 428.32  10/5/2012 - 9/23/2018        RESOLVED: Diastolic heart failure (HCC) ICD-10-CM: I50.30  ICD-9-CM: 428.30  10/5/2012 - 6/23/2018        RESOLVED: Pneumonia, organism unspecified ICD-10-CM: J18.9  ICD-9-CM: 486  10/1/2012 - 10/30/2012        RESOLVED: JASEN (obstructive sleep apnea) ICD-10-CM: G47.33  ICD-9-CM: 327.23  10/1/2012 - 10/14/2016        RESOLVED: Leukocytosis, unspecified ICD-10-CM: D39.850  ICD-9-CM: 288.60  10/1/2012 - 10/30/2012        RESOLVED: Anemia, unspecified ICD-10-CM: D64.9  ICD-9-CM: 285.9  10/1/2012 - 10/30/2012        RESOLVED: ARF (acute renal failure) ICD-10-CM: N17.9  ICD-9-CM: 584.9  10/1/2012 - 10/30/2012        RESOLVED: Pulmonary edema ICD-10-CM: J81.1  ICD-9-CM: 340  10/1/2012 - 10/30/2012        RESOLVED: Tobacco use disorder (Chronic) ICD-10-CM: F17.200  ICD-9-CM: 305.1  3/21/2012 - 6/25/2015        RESOLVED: Interstitial lung disease ([facility] [facility] (Chronic) ICD-10-CM: J84.9  ICD-9-CM: 214  2/28/2011 - 9/18/2017                Signed:      Willian Trimble MD   Family Medicine Resident      2/23/2019     Dr. Anali Bates MD   Family Medicine Attending

## 2019-02-26 ENCOUNTER — PATIENT OUTREACH (OUTPATIENT)
Dept: FAMILY MEDICINE CLINIC | Age: 64
End: 2019-02-26

## 2019-02-26 NOTE — PROGRESS NOTES
Hospital Discharge Follow-Up Date/Time:  19 3:23 PM 
 
Patient was admitted to [facility] on 19 and discharged on 19 for chest pain. Patient is s/p cardiac cath on 19. The physician discharge summary was available at the time of outreach. Patient was contacted within 2 business days of discharge. Top Challenges reviewed with the provider WBC's elevated on most recent CBC (13.1) - Hgb also low @ 8.2. Recheck? Patient reports they started her on iron supplementation at Dialysis. Recheck INR level Method of communication with provider :chart routing Inpatient RRAT score: 31 Was this a readmission? yes Patient stated reason for the readmission: chest pain Nurse Navigator (NN) contacted the patient by telephone to perform post hospital discharge assessment. Verified name and  with patient as identifiers. Provided introduction to self, and explanation of the Nurse Navigator role. Reviewed discharge instructions and red flags with patient who verbalized understanding. Patient given an opportunity to ask questions and does not have any further questions or concerns at this time. The patient agrees to contact the PCP office for questions related to their healthcare. NN provided contact information for future reference. Disease Specific:   N/A Summary of patient's top problems: 1. Chest Pain/SOB - in the setting of stent placement 19 and hx of DVT, pt on coumadin 3mg every day except for . INR 1.6, Trop neg x3, EKG WNL,VQ Scan low prob for PE, Consult cards noting most likely costochondritis vs GI, Continued home Imdur 120mg daily, Increase Protonix to BID s/p GI cocktail, follow up with GI outpt  
2. HFpEF - Last ECHO 17 EF 60% -Repeat ECHO showing EF 66-70% unchanged from previous study   
3. ESRD - Pt on dialysis MWF Home Health orders at discharge: none [address]: n/a Date of initial visit: n/a 
 
 Durable Medical Equipment ordered/company: none Durable Medical Equipment received: n/a Barriers to care? none identified Advance Care Planning:  
Does patient have an Advance Directive:  not on file Medication(s):  
New Medications at Discharge: none Changed Medications at Discharge: protonix Discontinued Medications at Discharge: none Medication reconciliation was performed with patient, who verbalizes understanding of administration of home medications. There were no barriers to obtaining medications identified at this time. Referral to Pharm D needed: no  
 
Current Outpatient Medications Medication Sig  pantoprazole (PROTONIX) 40 mg tablet TAKE 1 TABLET BY MOUTH TWICE A DAY FOR HEARTBURN  
 amLODIPine (NORVASC) 10 mg tablet Take 10 mg by mouth daily.  warfarin (COUMADIN) 3 mg tablet Take 3 mg by mouth Every Tues, Thur & Sat.  warfarin (COUMADIN) 3 mg tablet Take 3 mg by mouth Every Mon, Wed and Sat.  clopidogrel (PLAVIX) 75 mg tab Take 1 Tab by mouth daily.  diclofenac (VOLTAREN) 1 % gel Apply 2 g to affected area four (4) times daily. As needed for right knee pain  oxyCODONE-acetaminophen (PERCOCET) 7.5-325 mg per tablet Take 1 Tab by mouth two (2) times daily as needed for Pain. Max Daily Amount: 2 Tabs.  insulin aspart protamine/insulin aspart (NOVOLOG MIX 70-30 U-100 INSULN) 100 unit/mL (70-30) injection 10-40 Units by SubCUTAneous route nightly as needed (BG > 160).  isosorbide mononitrate ER (IMDUR) 120 mg CR tablet TAKE 1 TABLET BY MOUTH ONCE DAILY  allopurinol (ZYLOPRIM) 100 mg tablet TAKE 1 TABLET BY MOUTH EVERY DAY  nitroglycerin (NITROSTAT) 0.3 mg SL tablet 1 Tab by SubLINGual route every five (5) minutes as needed for Chest Pain.  carvedilol (COREG) 25 mg tablet Take 1.5 Tabs by mouth two (2) times daily (with meals).  albuterol (PROAIR HFA) 90 mcg/actuation inhaler Take 2 Puffs by inhalation every four (4) hours as needed for Wheezing.  ergocalciferol (VITAMIN D2) 50,000 unit capsule Take 50,000 Units by mouth every Tuesday.  atorvastatin (LIPITOR) 80 mg tablet Take 80 mg by mouth nightly. No current facility-administered medications for this visit. There are no discontinued medications. [facility] follow up appointment(s):  
Future Appointments Date Time Provider Meagan Mcintyre 2/28/2019  3:00 PM [place], [address] [facility] follow up appointment(s): none [facility]:  n/a  
 
 
Goals  Completes ACP   
  2/27/19 - No ACP on file. Will discuss at next call. KECRAIG  Prevent complications post hospitalization. 1/22/19 - Patient reports that she is waiting on a call back from cardiology to schedule follow-up. Reports that she has no concerns other than she has a lot of bruising on her arm from the cath access site. Also reports some swelling. No bleeding. Advised patient to monitor for worsening swelling, numbness/tingling in hand, and color changes. Recommended applying ice pack. Patient stated understanding and will call cardiology if any symptoms worsen. Patient has PCP appointment on 1/29/19 for INR check. Patient will schedule with cardiology and attend all appointments. ELGIN 
 
2/5/19 - Patient attended PCP appointment for INR check. Seeing pulmonology on 2/7. Seeing cardiology on 2/13. Patient also reports that she is having significant right knee pain. Patient previously had right knee replacement. Reports color change described as bruising, no swelling or warmth reported. No fevers. Appointment booked to see Dr. Nicolas Sharpe on 2/7/19. Patient will attend upcoming appointments. ELGIN 
 
2/27/19 - Reviewed discharge instructions and red flags. Patient will monitor and report concerns. Patient has PCP appointment tomorrow, 2/28/19. Patient due to scheduled with cardiologist. Patient needs repeat CBC and INR check.  ELGIN

## 2019-02-28 ENCOUNTER — OFFICE VISIT (OUTPATIENT)
Dept: FAMILY MEDICINE CLINIC | Age: 64
End: 2019-02-28

## 2019-02-28 VITALS
OXYGEN SATURATION: 96 % | DIASTOLIC BLOOD PRESSURE: 58 MMHG | HEART RATE: 74 BPM | WEIGHT: 293 LBS | BODY MASS INDEX: 41.95 KG/M2 | SYSTOLIC BLOOD PRESSURE: 96 MMHG | RESPIRATION RATE: 16 BRPM | HEIGHT: 70 IN | TEMPERATURE: 98 F

## 2019-02-28 DIAGNOSIS — Z51.81 ENCOUNTER FOR MONITORING COUMADIN THERAPY: Primary | ICD-10-CM

## 2019-02-28 DIAGNOSIS — E11.59 DM TYPE 2 CAUSING VASCULAR DISEASE (HCC): ICD-10-CM

## 2019-02-28 DIAGNOSIS — K21.9 GASTROESOPHAGEAL REFLUX DISEASE, ESOPHAGITIS PRESENCE NOT SPECIFIED: ICD-10-CM

## 2019-02-28 DIAGNOSIS — I20.0 UNSTABLE ANGINA (HCC): ICD-10-CM

## 2019-02-28 DIAGNOSIS — Z86.718 HX OF DEEP VENOUS THROMBOSIS: ICD-10-CM

## 2019-02-28 DIAGNOSIS — Z79.01 ENCOUNTER FOR MONITORING COUMADIN THERAPY: Primary | ICD-10-CM

## 2019-02-28 LAB
INR BLD: 2.2
PT POC: NORMAL SECONDS
VALID INTERNAL CONTROL?: YES

## 2019-02-28 RX ORDER — PANTOPRAZOLE SODIUM 40 MG/1
TABLET, DELAYED RELEASE ORAL
Qty: 90 TAB | Refills: 0 | Status: SHIPPED | OUTPATIENT
Start: 2019-02-28 | End: 2019-02-28 | Stop reason: SDUPTHER

## 2019-02-28 RX ORDER — PANTOPRAZOLE SODIUM 40 MG/1
TABLET, DELAYED RELEASE ORAL
Qty: 180 TAB | Refills: 3 | Status: SHIPPED | OUTPATIENT
Start: 2019-02-28 | End: 2020-03-23

## 2019-02-28 RX ORDER — WARFARIN 3 MG/1
TABLET ORAL
Qty: 30 TAB | Refills: 0
Start: 2019-02-28 | End: 2019-03-12

## 2019-02-28 NOTE — PROGRESS NOTES
Chief Complaint   Patient presents with   [facility] Follow Up    Medication Refill     Patient presents in office today for SEE f/u. She was admitted to [facility] on 2/19/19 for a cardiac cath a released on 2/20/19. She was then admitted to [facility] on 2/21/19 for chest pain and release on 2/23/19. States she is still feeling tightness in her chest.  Also her for INR check. Needs a refill of her Protonix. No concerns. 1. Have you been to the ER, urgent care clinic since your last visit? Hospitalized since your last visit? Yes When: 2/21/19 admitted to [facility] for chest pain     2. Have you seen or consulted any other health care providers outside of the [address] [place] [address] since your last visit? Include any pap smears or colon screening.  No    Learning Assessment 3/20/2018   PRIMARY LEARNER Patient   PRIMARY LANGUAGE ENGLISH   LEARNER PREFERENCE PRIMARY LISTENING   ANSWERED BY patient    RELATIONSHIP SELF

## 2019-02-28 NOTE — PATIENT INSTRUCTIONS
A Healthy Lifestyle: Care Instructions  Your Care Instructions    A healthy lifestyle can help you feel good, stay at a healthy weight, and have plenty of energy for both work and play. A healthy lifestyle is something you can share with your whole family. A healthy lifestyle also can lower your risk for serious health problems, such as high blood pressure, heart disease, and diabetes. You can follow a few steps listed below to improve your health and the health of your family. Follow-up care is a key part of your treatment and safety. Be sure to make and go to all appointments, and call your doctor if you are having problems. It's also a good idea to know your test results and keep a list of the medicines you take. How can you care for yourself at home? · Do not eat too much sugar, fat, or fast foods. You can still have dessert and treats now and then. The goal is moderation. · Start small to improve your eating habits. Pay attention to portion sizes, drink less juice and soda pop, and eat more fruits and vegetables. ? Eat a healthy amount of food. A 3-ounce serving of meat, for example, is about the size of a deck of cards. Fill the rest of your plate with vegetables and whole grains. ? Limit the amount of soda and sports drinks you have every day. Drink more water when you are thirsty. ? Eat at least 5 servings of fruits and vegetables every day. It may seem like a lot, but it is not hard to reach this goal. A serving or helping is 1 piece of fruit, 1 cup of vegetables, or 2 cups of leafy, raw vegetables. Have an apple or some carrot sticks as an afternoon snack instead of a candy bar. Try to have fruits and/or vegetables at every meal.  · Make exercise part of your daily routine. You may want to start with simple activities, such as walking, bicycling, or slow swimming. Try to be active 30 to 60 minutes every day. You do not need to do all 30 to 60 minutes all at once.  For example, you can exercise 3 times a day for 10 or 20 minutes. Moderate exercise is safe for most people, but it is always a good idea to talk to your doctor before starting an exercise program.  · Keep moving. [place] the lawn, work in the garden, or [employer] Take the stairs instead of the elevator at work. · If you smoke, quit. People who smoke have an increased risk for heart attack, stroke, cancer, and other lung illnesses. Quitting is hard, but there are ways to boost your chance of quitting tobacco for good. ? Use nicotine gum, patches, or lozenges. ? Ask your doctor about stop-smoking programs and medicines. ? Keep trying. In addition to reducing your risk of diseases in the future, you will notice some benefits soon after you stop using tobacco. If you have shortness of breath or asthma symptoms, they will likely get better within a few weeks after you quit. · Limit how much alcohol you drink. Moderate amounts of alcohol (up to 2 drinks a day for men, 1 drink a day for women) are okay. But drinking too much can lead to liver problems, high blood pressure, and other health problems. Family health  If you have a family, there are many things you can do together to improve your health. · Eat meals together as a family as often as possible. · Eat healthy foods. This includes fruits, vegetables, lean meats and dairy, and whole grains. · Include your family in your fitness plan. Most people think of activities such as jogging or tennis as the way to fitness, but there are many ways you and your family can be more active. Anything that makes you breathe hard and gets your heart pumping is exercise. Here are some tips:  ? Walk to do errands or to take your child to school or the bus.  ? Go for a family bike ride after dinner instead of watching TV. Where can you learn more? Go to http://lindsay-kai.info/. Enter Q622 in the search box to learn more about \"A Healthy Lifestyle: Care Instructions. \"  Current as of: September 11, 2018  Content Version: 11.9  © 3416-2003 [employer]. Care instructions adapted under license by [employer] (which disclaims liability or warranty for this information). If you have questions about a medical condition or this instruction, always ask your healthcare professional. [address] any warranty or liability for your use of this information.

## 2019-02-28 NOTE — PROGRESS NOTES
Jose Ponce is a 66 y.o. female   Chief Complaint   Patient presents with   [facility] Follow Up    Medication Refill    pt here for f/u from recent hospitalization @ [facility]. Pt had a cardiac cath there with Dr Adrienne Carrillo. Pt had a stent placed on 2/19 and is now on plavix. Pt also on coumadin currently which was restarted on coumadin, currently INR @ 2.2. Pt the week before was at [facility] for \"terrible abd pain\" and stones in her kidneys were found to be present. Pt has an appt with urology. Pt was given bentyl and states this is helping with her abd symptoms. CP is not as often as it was previously. Pt also with anemia and is getting iron when at dialysis and also getting epogen. Pt also has labs for her chol she believes at diabetes and will get a copy of these at dialysis and we will check her diabetes at her follow up in a few weeks. she is a 66y.o. year old female who presents for evalution. Reviewed PmHx, RxHx, FmHx, SocHx, AllgHx and updated and dated in the chart. Review of Systems - negative except as listed above in the HPI    Objective:     Vitals:    02/28/19 1506   BP: 96/58   Pulse: 74   Resp: 16   Temp: 98 °F (36.7 °C)   TempSrc: Oral   SpO2: 96%   Weight: 308 lb (139.7 kg)   Height: 5' 10\" (1.778 m)       Current Outpatient Medications   Medication Sig    warfarin (COUMADIN) 3 mg tablet Take 3mg po daily all days except Friday take nothing    pantoprazole (PROTONIX) 40 mg tablet TAKE 1 TABLET BY MOUTH TWICE A DAY FOR HEARTBURN    amLODIPine (NORVASC) 10 mg tablet Take 10 mg by mouth daily.  clopidogrel (PLAVIX) 75 mg tab Take 1 Tab by mouth daily.  diclofenac (VOLTAREN) 1 % gel Apply 2 g to affected area four (4) times daily. As needed for right knee pain    oxyCODONE-acetaminophen (PERCOCET) 7.5-325 mg per tablet Take 1 Tab by mouth two (2) times daily as needed for Pain. Max Daily Amount: 2 Tabs.     insulin aspart protamine/insulin aspart (NOVOLOG MIX 70-30 U-100 INSULN) 100 unit/mL (70-30) injection 10-40 Units by SubCUTAneous route nightly as needed (BG > 160).  isosorbide mononitrate ER (IMDUR) 120 mg CR tablet TAKE 1 TABLET BY MOUTH ONCE DAILY    allopurinol (ZYLOPRIM) 100 mg tablet TAKE 1 TABLET BY MOUTH EVERY DAY    nitroglycerin (NITROSTAT) 0.3 mg SL tablet 1 Tab by SubLINGual route every five (5) minutes as needed for Chest Pain.  carvedilol (COREG) 25 mg tablet Take 1.5 Tabs by mouth two (2) times daily (with meals).  albuterol (PROAIR HFA) 90 mcg/actuation inhaler Take 2 Puffs by inhalation every four (4) hours as needed for Wheezing.  ergocalciferol (VITAMIN D2) 50,000 unit capsule Take 50,000 Units by mouth every Tuesday.  atorvastatin (LIPITOR) 80 mg tablet Take 80 mg by mouth nightly. No current facility-administered medications for this visit. Physical Examination: General appearance - alert, well appearing, and in no distress  Mental status - alert, oriented to person, place, and time  Chest - clear to auscultation, no wheezes, rales or rhonchi, symmetric air entry  Heart - normal rate, regular rhythm, normal S1, S2, no murmurs, rubs, clicks or gallops      Assessment/ Plan:   Diagnoses and all orders for this visit:    1. Encounter for monitoring Coumadin therapy  -     AMB POC PT/INR  @ goal f/u 2-3 weeks for rechck  2. Gastroesophageal reflux disease, esophagitis presence not specified  -     pantoprazole (PROTONIX) 40 mg tablet; TAKE 1 TABLET BY MOUTH TWICE A DAY FOR HEARTBURN    3. Hx of deep venous thrombosis  -     warfarin (COUMADIN) 3 mg tablet; Take 3mg po daily all days except Friday take nothing    4. DM type 2 causing vascular disease ([facility] [address]  -     FUNDUS PHOTOGRAPHY  Pt requested defer labs until next visit and since will be here I n2-3 weeks will do then  5.  Unstable angina (HCC)  Improving s/p stent placement     Follow-up Disposition:  Return in about 3 weeks (around 3/21/2019), or if symptoms worsen or fail to improve. I have discussed the diagnosis with the patient and the intended plan as seen in the above orders. The patient has received an after-visit summary and questions were answered concerning future plans. Pt conveyed understanding of plan.     Medication Side Effects and Warnings were discussed with patient      Crystal Rai, DO

## 2019-03-06 ENCOUNTER — TELEPHONE (OUTPATIENT)
Dept: CARDIOLOGY CLINIC | Age: 64
End: 2019-03-06

## 2019-03-06 NOTE — TELEPHONE ENCOUNTER
Pt calling to inquire if we have received and returned her cardiac clearance request for her kidney stone procedure.     [phone]

## 2019-03-08 ENCOUNTER — TELEPHONE (OUTPATIENT)
Dept: CARDIOLOGY CLINIC | Age: 64
End: 2019-03-08

## 2019-03-08 LAB
LEFT EYE DIABETIC RETINOPATHY: ABNORMAL
LEFT EYE IMAGE QUALITY: ABNORMAL
LEFT EYE MACULAR EDEMA: ABNORMAL
LEFT EYE OTHER RETINOPATHY: ABNORMAL
RESULT: ABNORMAL
RIGHT EYE DIABETIC RETINOPATHY: ABNORMAL
RIGHT EYE IMAGE QUALITY: ABNORMAL
RIGHT EYE MACULAR EDEMA: ABNORMAL
RIGHT EYE OTHER RETINOPATHY: ABNORMAL
SEVERITY: ABNORMAL

## 2019-03-08 NOTE — PROGRESS NOTES
Called and spoke with patient in reference to results. Patient states that she already has an ophthalmologist and will call them to set up an apt.

## 2019-03-08 NOTE — PROGRESS NOTES
Your eye test is showing moderate eye disease in both eyes. Recommendation is for you to be seen by an eye Dr, a retina specialist if possible in the next month for follow up.   The number for [facility] is [phone]

## 2019-03-08 NOTE — TELEPHONE ENCOUNTER
Patient states the urologist's office re-faxed the clearance request, but they have not heard back from you.      Phone: [phone]

## 2019-03-11 ENCOUNTER — TELEPHONE (OUTPATIENT)
Dept: CARDIOLOGY CLINIC | Age: 64
End: 2019-03-11

## 2019-03-11 ENCOUNTER — PATIENT OUTREACH (OUTPATIENT)
Dept: FAMILY MEDICINE CLINIC | Age: 64
End: 2019-03-11

## 2019-03-11 NOTE — PROGRESS NOTES
Goals Addressed                 This Visit's Progress     Prevent complications post hospitalization. 1/22/19 - Patient reports that she is waiting on a call back from cardiology to schedule follow-up. Reports that she has no concerns other than she has a lot of bruising on her arm from the cath access site. Also reports some swelling. No bleeding. Advised patient to monitor for worsening swelling, numbness/tingling in hand, and color changes. Recommended applying ice pack. Patient stated understanding and will call cardiology if any symptoms worsen. Patient has PCP appointment on 1/29/19 for INR check. Patient will schedule with cardiology and attend all appointments. ELGIN    2/5/19 - Patient attended PCP appointment for INR check. Seeing pulmonology on 2/7. Seeing cardiology on 2/13. Patient also reports that she is having significant right knee pain. Patient previously had right knee replacement. Reports color change described as bruising, no swelling or warmth reported. No fevers. Appointment booked to see Dr. Yesi Webster on 2/7/19. Patient will attend upcoming appointments. ELGIN    2/27/19 - Reviewed discharge instructions and red flags. Patient will monitor and report concerns. Patient has PCP appointment tomorrow, 2/28/19. Patient due to scheduled with cardiologist. Patient needs repeat CBC and INR check. ELGIN    3/11/19 - Attended PCP appointment on 2/28. Called patient for follow-up. Patient reports that she is not feeling well today. Reports some nausea, low grade fever, and productive cough. States that she just generally doesn't feel good. Appointment booked with Dr. Yesi Webster for tomorrow at [facility] (earliest available). Patient also given contact information for [facility]. Patient will call to be seen if she feels she is getting worse before appointment with PCP tomorrow.  ELGIN

## 2019-03-11 NOTE — TELEPHONE ENCOUNTER
[facility] requesting Cardiac stratification for bilateral ureteroscopy with holmium laser and stent placement under general anesthesia.     Surgeon Nan Lynch     Please send last clinic note and testing  Fax to [phone]    Eri Rinaldi and Anthony Zelaya advise

## 2019-03-11 NOTE — TELEPHONE ENCOUNTER
Tommie Host out of office for 1 week and he will decide on stratification when he returns on Monday. Dorothea Brito can you review and see if pt needs to be seen before start can be given. Pt states has quite a bit of pain and would like the surgery as soon as possible.

## 2019-03-11 NOTE — TELEPHONE ENCOUNTER
Please call Mrs. Gonsales at [phone]. She needs to speak with you to confirm if her clearance for procedure has been sent to [facility], the office is stating that they've not received her clearance. She cannot schedule until they receive this clearance.      Thank you, Chele Pardo

## 2019-03-12 ENCOUNTER — HOSPITAL ENCOUNTER (OUTPATIENT)
Dept: LAB | Age: 64
Discharge: HOME | End: 2019-03-12
Payer: MEDICARE

## 2019-03-12 ENCOUNTER — OFFICE VISIT (OUTPATIENT)
Dept: FAMILY MEDICINE CLINIC | Age: 64
End: 2019-03-12

## 2019-03-12 ENCOUNTER — TELEPHONE (OUTPATIENT)
Dept: CARDIOLOGY CLINIC | Age: 64
End: 2019-03-12

## 2019-03-12 VITALS
BODY MASS INDEX: 41.95 KG/M2 | DIASTOLIC BLOOD PRESSURE: 66 MMHG | HEART RATE: 70 BPM | OXYGEN SATURATION: 98 % | RESPIRATION RATE: 16 BRPM | TEMPERATURE: 97.8 F | SYSTOLIC BLOOD PRESSURE: 107 MMHG | HEIGHT: 70 IN | WEIGHT: 293 LBS

## 2019-03-12 DIAGNOSIS — Z79.01 ENCOUNTER FOR MONITORING COUMADIN THERAPY: Primary | ICD-10-CM

## 2019-03-12 DIAGNOSIS — Z13.39 SCREENING FOR ALCOHOLISM: ICD-10-CM

## 2019-03-12 DIAGNOSIS — Z86.718 HX OF DEEP VENOUS THROMBOSIS: ICD-10-CM

## 2019-03-12 DIAGNOSIS — I25.10 ATHEROSCLEROSIS OF NATIVE CORONARY ARTERY OF NATIVE HEART, ANGINA PRESENCE UNSPECIFIED: Chronic | ICD-10-CM

## 2019-03-12 DIAGNOSIS — Z51.81 MEDICATION MONITORING ENCOUNTER: ICD-10-CM

## 2019-03-12 DIAGNOSIS — R52 BODY ACHES: ICD-10-CM

## 2019-03-12 DIAGNOSIS — R09.81 CONGESTION OF NASAL SINUS: ICD-10-CM

## 2019-03-12 DIAGNOSIS — E11.59 DM TYPE 2 CAUSING VASCULAR DISEASE (HCC): ICD-10-CM

## 2019-03-12 DIAGNOSIS — I20.0 UNSTABLE ANGINA (HCC): ICD-10-CM

## 2019-03-12 DIAGNOSIS — Z51.81 ENCOUNTER FOR MONITORING COUMADIN THERAPY: Primary | ICD-10-CM

## 2019-03-12 DIAGNOSIS — Z00.00 MEDICARE ANNUAL WELLNESS VISIT, SUBSEQUENT: ICD-10-CM

## 2019-03-12 DIAGNOSIS — G89.4 CHRONIC PAIN SYNDROME: ICD-10-CM

## 2019-03-12 DIAGNOSIS — R05.9 COUGH: ICD-10-CM

## 2019-03-12 DIAGNOSIS — M17.10 ARTHRITIS OF KNEE: ICD-10-CM

## 2019-03-12 LAB
FLUAV+FLUBV AG NOSE QL IA.RAPID: NEGATIVE POS/NEG
FLUAV+FLUBV AG NOSE QL IA.RAPID: NEGATIVE POS/NEG
INR BLD: 4.4
PT POC: 52.4 SECONDS
VALID INTERNAL CONTROL?: YES
VALID INTERNAL CONTROL?: YES

## 2019-03-12 PROCEDURE — 80076 HEPATIC FUNCTION PANEL: CPT

## 2019-03-12 PROCEDURE — 80061 LIPID PANEL: CPT

## 2019-03-12 PROCEDURE — 80365 DRUG SCREENING OXYCODONE: CPT

## 2019-03-12 PROCEDURE — 80307 DRUG TEST PRSMV CHEM ANLYZR: CPT

## 2019-03-12 PROCEDURE — 83036 HEMOGLOBIN GLYCOSYLATED A1C: CPT

## 2019-03-12 RX ORDER — OXYCODONE AND ACETAMINOPHEN 7.5; 325 MG/1; MG/1
1 TABLET ORAL
Qty: 60 TAB | Refills: 0 | Status: SHIPPED | OUTPATIENT
Start: 2019-03-12 | End: 2019-04-11 | Stop reason: SDUPTHER

## 2019-03-12 RX ORDER — WARFARIN 2 MG/1
TABLET ORAL
Qty: 30 TAB | Refills: 1 | Status: SHIPPED | OUTPATIENT
Start: 2019-03-12 | End: 2019-03-26

## 2019-03-12 NOTE — TELEPHONE ENCOUNTER
Notified [facility] that cardiac clearance cannot be given until  back in office on Monday 3/18/19

## 2019-03-12 NOTE — PATIENT INSTRUCTIONS
Ã¯Â»Â¿  Anticoagulants: After Your Visit  Your Care Instructions  Your doctor prescribed an anticoagulant medicine. Anticoagulants, often called blood thinners, prevent new blood clots from forming and keep existing clots from getting larger. They do not actually thin the blood, but they make the blood take longer to clot. This lowers the risk of a blood clot moving to the lungs (pulmonary embolism) or moving to the brain and causing a stroke. Blood thinners come in two forms. Heparin is given by shot, either under your skin or through a needle in your vein, and starts working right away. Warfarin (Coumadin) comes in pill form and takes longer to work. Your doctor may have you begin taking both forms at the same time. As soon as the pills start to work, you will stop the shots but continue to take the pills. If you have a blood clot in your leg, you may need to take warfarin for several months. People who have heart conditions such as atrial fibrillation often need to take it for the rest of their lives. The right dose of this medicine is different for each person. You will need regular blood tests to see if your dose is correct. Follow-up care is a key part of your treatment and safety. Be sure to make and go to all appointments, and call your doctor if you are having problems. Itâs also a good idea to know your test results and keep a list of the medicines you take. How do you take blood thinners? · Take your medicines exactly as prescribed. Call your doctor if you think you are having a problem with your medicine. · Call your doctor if you are not sure what to do if you missed a dose of blood thinner. ¨ Your doctor can tell you exactly what to do so you do not take too much or too little blood thinner. Then you will be as safe as possible. · Some general rules for what to do if you miss a dose:  ¨ If you remember it in the same day, take the missed dose. Then go back to your regular schedule.   ¨ If it is the next day, or almost time to take the next dose, do not take the missed dose. Do not double the dose to make up for the missed one. At your next regularly scheduled time, take your normal dose. ¨ If you miss your dose for 2 or more days, call your doctor. · To help you stay on schedule, use a calendar to remind you when to take your blood thinner. When you take the medicine, note it on the calendar. · If you are going to give yourself shots, your doctor will give you instructions for how to safely inject the medicine. Follow the directions carefully. · Do not take any vitamins, over-the-counter medicines, or herbal products without talking to your doctor first.  · Avoid contact sports and other activities that could lead to injury. Make your home safe and take other measures to reduce your risk of falling. Always wear a seat belt while in a car. · Do not suddenly change your intake of vitamin Kârich foods, such as broccoli, cabbage, asparagus, lettuce, and spinach. This will help blood thinners work evenly from day to day. · Limit alcohol to 2 drinks a day for men and 1 drink a day for women. Alcohol may interfere with blood thinners. It also increases your risk of falls, which can cause bruising and bleeding. · Tell your dentist, pharmacist, and other health professionals that you take blood thinners. Wear medical alert jewelry that says you take blood thinners. You can buy this at most drugstores. When should you call for help? Call 911 anytime you think you may need emergency care. For example, call if:  · You cough up blood. · You vomit blood or what looks like coffee grounds. · You pass maroon or very bloody stools. Call your doctor now or seek immediate medical care if:  · You have new bruises or blood spots under your skin. · You have a nosebleed. · Your gums bleed when you brush your teeth. · You have blood in your urine.   · Your stools are black and tarlike or have streaks of blood.  · You have vaginal bleeding when you are not having your period, or heavy period bleeding. Watch closely for changes in your health, and be sure to contact your doctor if:  · You have questions about your medicine. Where can you learn more? Go to [employer]  Enter Y099 in the search box to learn more about \"Anticoagulants: After Your Visit. \"   Â© 9485-4997 Healthwise, Incorporated. Care instructions adapted under license by [facility] [place] (which disclaims liability or warranty for this information). This care instruction is for use with your licensed healthcare professional. If you have questions about a medical condition or this instruction, always ask your healthcare professional. [address] any warranty or liability for your use of this information. Content Version: 2.2.92218; Last Revised: July 1, 2009  Medicare Wellness Visit, Female     The best way to live healthy is to have a lifestyle where you eat a well-balanced diet, exercise regularly, limit alcohol use, and quit all forms of tobacco/nicotine, if applicable. Regular preventive services are another way to keep healthy. Preventive services (vaccines, screening tests, monitoring & exams) can help personalize your care plan, which helps you manage your own care. Screening tests can find health problems at the earliest stages, when they are easiest to treat. [facility] follows the current, evidence-based guidelines published by the [place] (USPSTF) when recommending preventive services for our patients. Because we follow these guidelines, sometimes recommendations change over time as research supports it. (For example, mammograms used to be recommended annually.  Even though Medicare will still pay for an annual mammogram, the newer guidelines recommend a mammogram every two years for women of average risk.)  Of course, you and your doctor may decide to screen more often for some diseases, based on your risk and your health status. Preventive services for you include:  - Medicare offers their members a free annual wellness visit, which is time for you and your primary care provider to discuss and plan for your preventive service needs. Take advantage of this benefit every year!  -All adults over the age of 72 should receive the recommended pneumonia vaccines. Current USPSTF guidelines recommend a series of two vaccines for the best pneumonia protection.   -All adults should have a flu vaccine yearly and a tetanus vaccine every 10 years. All adults age 61 and older should receive a shingles vaccine once in their lifetime.    -A bone mass density test is recommended when a woman turns 65 to screen for osteoporosis. This test is only recommended one time, as a screening. Some providers will use this same test as a disease monitoring tool if you already have osteoporosis. -All adults age 38-68 who are overweight should have a diabetes screening test once every three years.   -Other screening tests and preventive services for persons with diabetes include: an eye exam to screen for diabetic retinopathy, a kidney function test, a foot exam, and stricter control over your cholesterol.   -Cardiovascular screening for adults with routine risk involves an electrocardiogram (ECG) at intervals determined by your doctor.   -Colorectal cancer screenings should be done for adults age 54-65 with no increased risk factors for colorectal cancer. There are a number of acceptable methods of screening for this type of cancer. Each test has its own benefits and drawbacks. Discuss with your doctor what is most appropriate for you during your annual wellness visit. The different tests include: colonoscopy (considered the best screening method), a fecal occult blood test, a fecal DNA test, and sigmoidoscopy.   -Breast cancer screenings are recommended every other year for women of normal risk, age 54-69.  -Cervical cancer screenings for women over age 72 are only recommended with certain risk factors.   -All adults born between [facility] should be screened once for Hepatitis C.      Here is a list of your current Health Maintenance items (your personalized list of preventive services) with a due date:  Health Maintenance Due   Topic Date Due    Diabetic Foot Care  12/14/1967    Pneumococcal Vaccine (1 of 3 - PCV13) 12/14/1976    DTaP/Tdap/Td  (1 - Tdap) 12/14/1978    Pap Test  12/14/1978    Shingles Vaccine (1 of 2) 12/14/2007    Mammogram  12/14/2007    Cholesterol Test   11/09/2015    Stool testing for trace blood  02/18/2018    Annual Well Visit  05/18/2018    Hemoglobin A1C    11/30/2018

## 2019-03-12 NOTE — PROGRESS NOTES
Max Savage is a 66 y.o. female   Chief Complaint   Patient presents with    Cough    Fever    Nasal Congestion    Generalized Body Aches    pt here for repeat INR, currently at 4.4 and was prev at 2.2 on last check. Pt denies any changes with her diet. Currently on 3mg daily except Friday takes nothing. No bleeding. Pt also reports feeling unwell and states she was told she has kidney stones and these are painful. Pt is seeing urology for this currently. Pt is on dialysis but does still make urine. She is going to have a procedure for removal of the stones but is waiting for cardiac clearance. Some mild pressure when she urinates. Pt states she had a fever last couple days up to 101.  + cough prod with thick mucous brownish tint. No increase in sob. No ear pain no sore throat. Pt is also requesting refill of her pain medication used for her chronic chest pain. Pt has no hx of abuse and this brings her pain from a 8-9/10 to a 4-5/10. Pt is on multiple blood thinners and is not a candidate for nsaid therapy. This allows pt to help around her home, tho she is severely limited by her oxygen dependency and weakness from ESRD/dialysis. Opioid/Pain Management:    1. Has the patient signed a pain contract for chronic narcotic use? yes  2. Has the patient had a UDS or Serum screen as per guidelines as per [facility] of [facility]?:   yes    3. Does patient meet necessary guidelines for Naloxone treatement per the [facility]?:    no  4. Has the Prescription Monitoring Program been reviewed? yes  5. Does patient have a long term condition that requires long term use of a Narcotic?  yes  6. Has patient been tolerant of therapy and responsible to routine follow up and specialist follow-up? Yes      she is a 66y.o. year old female who presents for evalution. Reviewed PmHx, RxHx, FmHx, SocHx, AllgHx and updated and dated in the chart.     Review of Systems - negative except as listed above in the HPI    Objective:     Vitals:    03/12/19 1555   BP: 107/66   Pulse: 70   Resp: 16   Temp: 97.8 °F (36.6 °C)   TempSrc: Oral   SpO2: 98%   Weight: 308 lb (139.7 kg)   Height: 5' 10\" (1.778 m)       Current Outpatient Medications   Medication Sig    warfarin (COUMADIN) 2 mg tablet Take 1 tab PO qday    oxyCODONE-acetaminophen (PERCOCET) 7.5-325 mg per tablet Take 1 Tab by mouth two (2) times daily as needed for Pain for up to 30 days. Max Daily Amount: 2 Tabs.  pantoprazole (PROTONIX) 40 mg tablet TAKE 1 TABLET BY MOUTH TWICE A DAY FOR HEARTBURN    amLODIPine (NORVASC) 10 mg tablet Take 10 mg by mouth daily.  clopidogrel (PLAVIX) 75 mg tab Take 1 Tab by mouth daily.  diclofenac (VOLTAREN) 1 % gel Apply 2 g to affected area four (4) times daily. As needed for right knee pain    insulin aspart protamine/insulin aspart (NOVOLOG MIX 70-30 U-100 INSULN) 100 unit/mL (70-30) injection 10-40 Units by SubCUTAneous route nightly as needed (BG > 160).  isosorbide mononitrate ER (IMDUR) 120 mg CR tablet TAKE 1 TABLET BY MOUTH ONCE DAILY    allopurinol (ZYLOPRIM) 100 mg tablet TAKE 1 TABLET BY MOUTH EVERY DAY    nitroglycerin (NITROSTAT) 0.3 mg SL tablet 1 Tab by SubLINGual route every five (5) minutes as needed for Chest Pain.  carvedilol (COREG) 25 mg tablet Take 1.5 Tabs by mouth two (2) times daily (with meals).  albuterol (PROAIR HFA) 90 mcg/actuation inhaler Take 2 Puffs by inhalation every four (4) hours as needed for Wheezing.  ergocalciferol (VITAMIN D2) 50,000 unit capsule Take 50,000 Units by mouth every Tuesday.  atorvastatin (LIPITOR) 80 mg tablet Take 80 mg by mouth nightly. No current facility-administered medications for this visit.         Physical Examination: General appearance - alert, well appearing, and in no distress  Eyes - pupils equal and reactive, extraocular eye movements intact  Ears - bilateral TM's and external ear canals normal  Nose - normal and patent, no erythema, discharge or polyps  Mouth - mucous membranes moist, pharynx normal without lesions  Neck - supple, no significant adenopathy  Chest - clear to auscultation, no wheezes, rales or rhonchi, symmetric air entry  Heart - normal rate, regular rhythm, normal S1, S2, no murmurs, rubs, clicks or gallops      Assessment/ Plan:   Diagnoses and all orders for this visit:    1. Encounter for monitoring Coumadin therapy  -     AMB POC PT/INR    2. Cough  -     AMB POC LYN INFLUENZA A/B TEST    3. Congestion of nasal sinus  -     AMB POC LYN INFLUENZA A/B TEST    4. Body aches  -     AMB POC LYN INFLUENZA A/B TEST    5. Arthritis of knee  -     REFERRAL TO ORTHOPEDIC SURGERY    6. Hx of deep venous thrombosis  -     warfarin (COUMADIN) 2 mg tablet; Take 1 tab PO qday    7. DM type 2 causing vascular disease ([facility]  -     REFERRAL TO ENDOCRINOLOGY  -     HEMOGLOBIN A1C WITH EAG    8. Medication monitoring encounter  -     DRUG SCREEN 11 W/CONF, SERUM    9. Atherosclerosis of native coronary artery of native heart, angina presence unspecified  -     LIPID PANEL  -     HEPATIC FUNCTION PANEL    10. Chronic pain syndrome  -     oxyCODONE-acetaminophen (PERCOCET) 7.5-325 mg per tablet; Take 1 Tab by mouth two (2) times daily as needed for Pain for up to 30 days. Max Daily Amount: 2 Tabs. 11. Unstable angina ([facility]  -     oxyCODONE-acetaminophen (PERCOCET) 7.5-325 mg per tablet; Take 1 Tab by mouth two (2) times daily as needed for Pain for up to 30 days. Max Daily Amount: 2 Tabs. 12. Medicare annual wellness visit, subsequent    13. Screening for alcoholism  -     [place] ANNUAL ALCOHOL SCREEN 15 MIN     skip dose of coumadin for 2 days then start 2mg daily    Acute viral illness neg flu, Supportive care discussed with pt   Follow-up Disposition:  Return in about 2 weeks (around 3/26/2019), or if symptoms worsen or fail to improve.     I have discussed the diagnosis with the patient and the intended plan as seen in the above orders. The patient has received an after-visit summary and questions were answered concerning future plans. Pt conveyed understanding of plan. Medication Side Effects and Warnings were discussed with patient      [address] [facility], DO         This is the Subsequent Medicare Annual Wellness Exam, performed 12 months or more after the Initial AWV or the last Subsequent AWV    I have reviewed the patient's medical history in detail and updated the computerized patient record.      History     Past Medical History:   Diagnosis Date     Sleep Apnea 2/16/2011    Aortic aneurysm (HCC)     CAD (coronary Artery Disease) Native Artery 2/16/2011    CKD (chronic kidney disease) stage 4, GFR 15-29 ml/min ([facility]) 2/10/2017    COPD (chronic obstructive pulmonary disease) ([facility])     Diabetic gastroparesis ([facility])     Diastolic heart failure ([address] 10/5/2012    DM type 2 causing neurological disease ([address]     DM type 2 causing renal disease ([address]     DM type 2 causing vascular disease ([address]     Esophageal stricture 2012    dialted Dr. Ginette Ross G6PD deficiency ([address]     trait    GERD (gastroesophageal reflux disease)     Gout     ILD (interstitial lung disease) ([address]     open lung bx CJW 2010    Morbid obesity ([address]     OA (osteoarthritis)     Ovarian cancer ([address]     cervical and uterine    Rheumatoid arteritis     S/P left pulmonary artery pressure sensor implant placement 8/31/2017    [employer]     Tobacco use disorder 3/21/2012    Uterine cervix cancer ([address]     Vitamin D deficiency 8/9/2013      Past Surgical History:   Procedure Laterality Date    CARDIAC SURG PROCEDURE UNLIST      stents    COLONOSCOPY N/A 6/24/2016    [facility] HX APPENDECTOMY      HX CARPAL TUNNEL RELEASE      bilateral    HX CHOLECYSTECTOMY      HX HERNIA REPAIR      HX HYSTERECTOMY      HX ORTHOPAEDIC  11/12/12    right knee replacement    HX TONSIL AND ADENOIDECTOMY      UPPER GI ENDOSCOPY,VINOD W GUIDE  6/24/2016          Current Outpatient Medications   Medication Sig Dispense Refill    warfarin (COUMADIN) 2 mg tablet Take 1 tab PO qday 30 Tab 1    oxyCODONE-acetaminophen (PERCOCET) 7.5-325 mg per tablet Take 1 Tab by mouth two (2) times daily as needed for Pain for up to 30 days. Max Daily Amount: 2 Tabs. 60 Tab 0    pantoprazole (PROTONIX) 40 mg tablet TAKE 1 TABLET BY MOUTH TWICE A DAY FOR HEARTBURN 180 Tab 3    amLODIPine (NORVASC) 10 mg tablet Take 10 mg by mouth daily.  clopidogrel (PLAVIX) 75 mg tab Take 1 Tab by mouth daily. 30 Tab 11    diclofenac (VOLTAREN) 1 % gel Apply 2 g to affected area four (4) times daily. As needed for right knee pain 100 g 5    insulin aspart protamine/insulin aspart (NOVOLOG MIX 70-30 U-100 INSULN) 100 unit/mL (70-30) injection 10-40 Units by SubCUTAneous route nightly as needed (BG > 160).  isosorbide mononitrate ER (IMDUR) 120 mg CR tablet TAKE 1 TABLET BY MOUTH ONCE DAILY 90 Tab 0    allopurinol (ZYLOPRIM) 100 mg tablet TAKE 1 TABLET BY MOUTH EVERY DAY 30 Tab 5    nitroglycerin (NITROSTAT) 0.3 mg SL tablet 1 Tab by SubLINGual route every five (5) minutes as needed for Chest Pain. 1 Bottle 1    carvedilol (COREG) 25 mg tablet Take 1.5 Tabs by mouth two (2) times daily (with meals). 180 Tab 3    albuterol (PROAIR HFA) 90 mcg/actuation inhaler Take 2 Puffs by inhalation every four (4) hours as needed for Wheezing.  ergocalciferol (VITAMIN D2) 50,000 unit capsule Take 50,000 Units by mouth every Tuesday.  atorvastatin (LIPITOR) 80 mg tablet Take 80 mg by mouth nightly.        Allergies   Allergen Reactions    Contrast Dye [Iodine] Anaphylaxis     Tolerates when pre medicated w/ IV solumedrol and benadryl prior to procedure     Levaquin [Levofloxacin] Nausea and Vomiting    Morphine Hives and Itching     Family History   Problem Relation Age of Onset    Heart Disease Mother     Heart Disease Brother      Social History     Tobacco Use    Smoking status: Former Smoker     Packs/day: 0.50     Years: 41.00     Pack years: 20.50     Types: Cigarettes     Last attempt to quit: 2014     Years since quittin.3    Smokeless tobacco: Never Used   Substance Use Topics    Alcohol use: No     Patient Active Problem List   Diagnosis Code    CAD (coronary Artery Disease) Native Artery I25.10    JASEN on CPAP G47.33, Z99.89    Pulmonary HTN I27.20    HTN (hypertension) I10    Morbid obesity E66.01    Esophageal reflux K21.9    Gastroparesis K31.84    Normocytic anemia D64.9    DM (diabetes mellitus), type 2 with renal complications ([address] H32.45    COPD, severe ([address] J44.9    ILD (interstitial lung disease) ([address] J84.9    Warfarin anticoagulation Z79.01    Hx of deep venous thrombosis Z86.718    Sleep apnea G47.30    Diabetic gastroparesis (HCC) E11.43, K31.84    GERD (gastroesophageal reflux disease) K21.9    Rheumatoid arteritis I00    DM type 2 causing neurological disease ([address] E11.49    DM type 2 causing renal disease ([address] E11.29    DM type 2 causing vascular disease ([address] E11.59    Gout M10.9    Limited mobility Z74.09    ESRD on hemodialysis (HCC) N18.6, Z99.2    (HFpEF) heart failure with preserved ejection fraction (HCC) I50.30    Unstable angina (HCC) I20.0    S/P cardiac cath Z98.890    Chest pain R07.9       Depression Risk Factor Screening:     3 most recent PHQ Screens 2019   Little interest or pleasure in doing things Not at all   Feeling down, depressed, irritable, or hopeless Not at all   Total Score PHQ 2 0     Alcohol Risk Factor Screening: You do not drink alcohol or very rarely. Functional Ability and Level of Safety:   Hearing Loss  Hearing is good. Activities of Daily Living  The home contains: no safety equipment. Patient does total self care    Fall Risk  No flowsheet data found.     Abuse Screen  Patient is not abused    Cognitive Screening   Evaluation of Cognitive Function:  Has your family/caregiver stated any concerns about your memory: no  Normal    Patient Care Team   Patient Care Team:  Tom Esparza DO as PCP - General (Family Practice)  Natalie Marroquin MD (Endocrinology)  Cece Hitchcock MD (Orthopedic Surgery)  Christian Long MD as Consulting Provider (Cardiology)  Rogelio Cabrera MD as Consulting Provider (Nephrology)  Mojgan Smith DO as Consulting Provider (Pulmonary Disease)  Gwen Bernstein MD as Consulting Provider (Vascular Surgery)  Sina Michael RN as Ambulatory Care Navigator [facility])  Ian Preston MD as Physician (Cardiology)    Assessment/Plan   Education and counseling provided:  Are appropriate based on today's review and evaluation    Diagnoses and all orders for this visit:    1. Encounter for monitoring Coumadin therapy  -     AMB POC PT/INR    2. Cough  -     AMB POC LYN INFLUENZA A/B TEST    3. Congestion of nasal sinus  -     AMB POC LYN INFLUENZA A/B TEST    4. Body aches  -     AMB POC LYN INFLUENZA A/B TEST    5. Arthritis of knee  -     REFERRAL TO ORTHOPEDIC SURGERY    6. Hx of deep venous thrombosis  -     warfarin (COUMADIN) 2 mg tablet; Take 1 tab PO qday    7. DM type 2 causing vascular disease ([address]  -     REFERRAL TO ENDOCRINOLOGY  -     HEMOGLOBIN A1C WITH EAG    8. Medication monitoring encounter  -     DRUG SCREEN 11 W/CONF, SERUM    9. Atherosclerosis of native coronary artery of native heart, angina presence unspecified  -     LIPID PANEL  -     HEPATIC FUNCTION PANEL    10. Chronic pain syndrome  -     oxyCODONE-acetaminophen (PERCOCET) 7.5-325 mg per tablet; Take 1 Tab by mouth two (2) times daily as needed for Pain for up to 30 days. Max Daily Amount: 2 Tabs. 11. Unstable angina ([address]  -     oxyCODONE-acetaminophen (PERCOCET) 7.5-325 mg per tablet; Take 1 Tab by mouth two (2) times daily as needed for Pain for up to 30 days.  Max Daily Amount: 2 Tabs.    12. Medicare annual wellness visit, subsequent    13. Screening for alcoholism  -     [place] ANNUAL ALCOHOL SCREEN 15 MIN        Health Maintenance Due   Topic Date Due    FOOT EXAM Q1  12/14/1967    Pneumococcal 19-64 Highest Risk (1 of 3 - PCV13) 12/14/1976    DTaP/Tdap/Td series (1 - Tdap) 12/14/1978    PAP AKA CERVICAL CYTOLOGY  12/14/1978    Shingrix Vaccine Age 50> (1 of 2) 12/14/2007    BREAST CANCER SCRN MAMMOGRAM  12/14/2007    LIPID PANEL Q1  11/09/2015    FOBT Q 1 YEAR AGE 50-75  02/18/2018    MEDICARE YEARLY EXAM  05/18/2018    HEMOGLOBIN A1C Q6M  11/30/2018     I have discussed the diagnosis with the patient and the intended plan as seen in the above orders. The patient has received an after-visit summary and questions were answered concerning future plans. Pt conveyed understanding of plan.        [address] [facility] [address]

## 2019-03-12 NOTE — PROGRESS NOTES
Chief Complaint   Patient presents with    Cough    Fever    Nasal Congestion    Generalized Body Aches     Patient presents in office today with c/o cough and congestion since Sunday. States that her fever and body aches started yesterday. Not treating with anything. No other concerns. 1. Have you been to the ER, urgent care clinic since your last visit? Hospitalized since your last visit? No    2. Have you seen or consulted any other health care providers outside of the [address] [place] [address] since your last visit? Include any pap smears or colon screening.  No    Learning Assessment 3/20/2018   PRIMARY LEARNER Patient   PRIMARY LANGUAGE ENGLISH   LEARNER PREFERENCE PRIMARY LISTENING   ANSWERED BY patient    RELATIONSHIP SELF

## 2019-03-12 NOTE — TELEPHONE ENCOUNTER
Nelson Mae with [facility] called to see if we received the clearance fax on 3/7 fax number [phone]. She can be reached at [phone] or fax the clearance to [phone].     [employer]

## 2019-03-15 DIAGNOSIS — I25.10 ATHEROSCLEROSIS OF NATIVE CORONARY ARTERY OF NATIVE HEART, ANGINA PRESENCE UNSPECIFIED: Primary | Chronic | ICD-10-CM

## 2019-03-16 LAB
EST. AVERAGE GLUCOSE BLD GHB EST-MCNC: 100 MG/DL
HBA1C MFR BLD: 5.1 % (ref 4.8–5.6)

## 2019-03-18 ENCOUNTER — PATIENT OUTREACH (OUTPATIENT)
Dept: FAMILY MEDICINE CLINIC | Age: 64
End: 2019-03-18

## 2019-03-18 NOTE — PROGRESS NOTES
Goals Addressed                 This Visit's Progress     Prevent complications post hospitalization. 1/22/19 - Patient reports that she is waiting on a call back from cardiology to schedule follow-up. Reports that she has no concerns other than she has a lot of bruising on her arm from the cath access site. Also reports some swelling. No bleeding. Advised patient to monitor for worsening swelling, numbness/tingling in hand, and color changes. Recommended applying ice pack. Patient stated understanding and will call cardiology if any symptoms worsen. Patient has PCP appointment on 1/29/19 for INR check. Patient will schedule with cardiology and attend all appointments. [facility]    2/5/19 - Patient attended PCP appointment for INR check. Seeing pulmonology on 2/7. Seeing cardiology on 2/13. Patient also reports that she is having significant right knee pain. Patient previously had right knee replacement. Reports color change described as bruising, no swelling or warmth reported. No fevers. Appointment booked to see Dr. Chiquita Osler on 2/7/19. Patient will attend upcoming appointments. [facility]    2/27/19 - Reviewed discharge instructions and red flags. Patient will monitor and report concerns. Patient has PCP appointment tomorrow, 2/28/19. Patient due to scheduled with cardiologist. Patient needs repeat CBC and INR check. [facility]    3/11/19 - Attended PCP appointment on 2/28. Called patient for follow-up. Patient reports that she is not feeling well today. Reports some nausea, low grade fever, and productive cough. States that she just generally doesn't feel good. Appointment booked with Dr. Chiquita Osler for tomorrow at [place] [id number] (earliest available). Patient also given contact information for [employer]. Patient will call to be seen if she feels she is getting worse before appointment with PCP tomorrow. [facility]    3/18/19 - Patient reports that flu like symptoms have improved. She is having flank pain related to kidney stones.  Awaiting cardiac clearance for urology procedure. Patient calling cardiology today to discuss. Attended appointment with Dr. Marissa Dunlap. Flu test negative. INR was elevated, Dr. Marissa Dunlap adjusted coumadin. Patient has appointment 3/26 for repeat INR. Reviewed bleeding precautions.  ELGIN

## 2019-03-19 NOTE — PROGRESS NOTES
[address]  [address],  [address]  [place], [address] [facility]    Office [phone]  Fax [phone]  Cell [phone]    Dao Acosta is a 66 y.o. female. Referred for preoperative cardiac risk stratification prior to bilateral ureteroscopy with laser/stent placement under GA by Dr. Camargo Man    Assessment  Encounter Diagnoses   Name Primary?  Atherosclerosis of native coronary artery of native heart with stable angina pectoris (HCC)     (HFpEF) heart failure with preserved ejection fraction (HCC) Yes    Essential hypertension     Type 2 diabetes mellitus with chronic kidney disease on chronic dialysis, with long-term current use of insulin (HCC)     JASEN on CPAP     Pulmonary HTN     Gastroesophageal reflux disease, esophagitis presence not specified     COPD, severe ([address]     ILD (interstitial lung disease) ([address]     Obstructive sleep apnea syndrome     ESRD on hemodialysis ([address]     Morbid obesity     Normocytic anemia     Warfarin anticoagulation     Hx of deep venous thrombosis     Nephrolithiasis     Symptomatic PVCs      Recommendations:  1. HFpEF, class 3 with moderate to severe PA HTN, s/p cardioMEMS. She has not had any recent readings. Volume is being managed by dialysis. She is no longer on diuretic therapy. 2. Chronic lung disease, oxygen dependent. She is adherent with CPAP. 3. CAD, s/p PCI RCA  1 month ago. She has had no significant impact on her symptom status. I suspect she has microvascular dysfunction as well. The key is keeping her filling pressures down. Continue oral nitrates. Continue Plavix plus Warfarin (VTE). 4. Symptomatic PVCs. Recent 48 hour holter without AF. I reassured her these were benign. She is already on a good dose of carvedilol. 5. Chronic anemia due to ESRD procrit resistant    6. T2DM managed by Lalitha Gupat DO    7. Morbid Obesity    8.  Chronic anticoagulation due to previous DVT. Continue Coumadin. INR being followed by Dr. Yogesh Adams. 9. Low-Intermediate cardiac risk for  procedure under GA. She can certainly stop Warfarin 4-5 days pre-op, but I would not interrupt Plavix given her recent PCI. Follow-up Disposition: Not on File     Subjective:  She underwent osteoal RCA  PCI by Dr. Charmayne Mettle 2/19/19 with a good angiographic result. No significant disease in LCX or RCA noted. She was subsequently hospitalized from 2/21-2/23/19 at [address] [place] [address] with non-cardiac chest pain. Echo demonstrated normal LV function and no pericardial effusion. She was also diagnozed with bilateral kidney stones last month by CT. She remains symptomatic with bilateral back pain as well as dysuria. She denies hematuria recently. Her symptom status following PCI is largely unchanged. She continues to have class 3 LARA with intermittent chest tightness. Confusing this picture is GERD. She has intermittent palpitations characterized by skipped beats. She states they cause her to need to catch her breath. She states she had symptoms during previous holter monitor. Patient denies any syncope, orthopnea, edema or paroxysmal nocturnal dyspnea. She has significant right knee pain. Cardiac risk factors   HTN yes  DM yes    Cardiac testing  CATH: 2004: 1v CAD by cath - PCI OM with SAL  CATH 5/2004 - patent stent, no new disease   Lexiscan cardiolite 12/09- normal perfusion, EF 66%  ECHO 11/2009: LVH, EF 63%, diastolic dysfunction  STRESS/LEXISCAN/CARDIOLITE 3/29/11: normal perfusion, EF 62%  CATH: 3/20/2012 :PA 55/30 mean 41, wedge 26, RA 24, EDP 35, LVG 55%, LM normal, LAD normal, LCX - OM1 patent stent, OM2 prox 85% long, % w/ L -> R collateral; s/p SAL-> OM2/OM3 with SAL. CATH: 10/4/2012: EDP 25, EF 55%, LM nl, LAD mild plaque, LCX patent OM stents, % prox with good L to R collaterals.   Cath 3/18/2014 - RA 9 PA 42/19/29, PCW 12, EDP 25, no LVG due to CKD, LM nl, LAD mild plaque, LCX patent stents OM1/OM2, RCA chronic proximal occlusion with L to R collaterals. ECHO 4/11/16: EF 60-65%. No WMA. LA mildly dilated. Lexiscan Cardiolite 4/11/16: Normal. LVEF 55%. Compared to 3/29/11, no significant changes. RHC/CardioMEMS implant 8/31/17 - RA 12, PA 56/20/30, CI ([place]te) 2.65    Echo 11/30/17 - EF 65%. G1DD. No WMA. Wall thickness was mildly to moderately increased. Limited Echo 12/13/17 - Tricuspid valve: Pulmonary artery diastolic pressure: 43.44 mmHg. [address] 6/5/18 Moderate-severe PA HTN, post capillary  Marked elevation in biV filling pressures    Event Monitor in 8/2018 demonstrated frequent PVCs    48 hour Holter monitor 2/5/19 - SB to ST 53 to 109, average HR 73. No AF/flutter.   Echo 2/22/19 - EF 66-70%    Past Medical History:   Diagnosis Date     Sleep Apnea 2/16/2011    Aortic aneurysm ([facility])     CAD (coronary Artery Disease) Native Artery 2/16/2011    CKD (chronic kidney disease) stage 4, GFR 15-29 ml/min ([facility]) 2/10/2017    COPD (chronic obstructive pulmonary disease) ([facility])     Diabetic gastroparesis ([facility])     Diastolic heart failure ([address] 10/5/2012    DM type 2 causing neurological disease ([address]     DM type 2 causing renal disease ([address]     DM type 2 causing vascular disease ([address]     Esophageal stricture 2012    dialted Dr. Bre Schwab [id number] deficiency [facility])     trait    GERD (gastroesophageal reflux disease)     Gout     ILD (interstitial lung disease) ([address]     open lung bx CJW 2010    Morbid obesity ([address]     OA (osteoarthritis)     Ovarian cancer ([address]     cervical and uterine    Rheumatoid arteritis     S/P left pulmonary artery pressure sensor implant placement 8/31/2017    [facility]     Tobacco use disorder 3/21/2012    Uterine cervix cancer ([address]     Vitamin D deficiency 8/9/2013        Current Outpatient Medications   Medication Sig Dispense Refill    warfarin (COUMADIN) 2 mg tablet Take 1 tab PO qday 30 Tab 1    oxyCODONE-acetaminophen (PERCOCET) 7.5-325 mg per tablet Take 1 Tab by mouth two (2) times daily as needed for Pain for up to 30 days. Max Daily Amount: 2 Tabs. 60 Tab 0    pantoprazole (PROTONIX) 40 mg tablet TAKE 1 TABLET BY MOUTH TWICE A DAY FOR HEARTBURN 180 Tab 3    amLODIPine (NORVASC) 10 mg tablet Take 10 mg by mouth daily.  clopidogrel (PLAVIX) 75 mg tab Take 1 Tab by mouth daily. 30 Tab 11    diclofenac (VOLTAREN) 1 % gel Apply 2 g to affected area four (4) times daily. As needed for right knee pain 100 g 5    insulin aspart protamine/insulin aspart (NOVOLOG MIX 70-30 U-100 INSULN) 100 unit/mL (70-30) injection 10-40 Units by SubCUTAneous route nightly as needed (BG > 160).  isosorbide mononitrate ER (IMDUR) 120 mg CR tablet TAKE 1 TABLET BY MOUTH ONCE DAILY 90 Tab 0    allopurinol (ZYLOPRIM) 100 mg tablet TAKE 1 TABLET BY MOUTH EVERY DAY 30 Tab 5    nitroglycerin (NITROSTAT) 0.3 mg SL tablet 1 Tab by SubLINGual route every five (5) minutes as needed for Chest Pain. 1 Bottle 1    carvedilol (COREG) 25 mg tablet Take 1.5 Tabs by mouth two (2) times daily (with meals). 180 Tab 3    albuterol (PROAIR HFA) 90 mcg/actuation inhaler Take 2 Puffs by inhalation every four (4) hours as needed for Wheezing.  ergocalciferol (VITAMIN D2) 50,000 unit capsule Take 50,000 Units by mouth every Tuesday.  atorvastatin (LIPITOR) 80 mg tablet Take 80 mg by mouth nightly. Allergies   Allergen Reactions    Contrast Dye [Iodine] Anaphylaxis     Tolerates when pre medicated w/ IV solumedrol and benadryl prior to procedure     Levaquin [Levofloxacin] Nausea and Vomiting    Morphine Hives and Itching      Review of Systems  Constitutional: Negative for fever, chills, malaise/fatigue and diaphoresis. Respiratory: Negative for cough, hemoptysis, sputum production, and wheezing. +LARA  Cardiovascular: Negative for orthopnea, claudication, leg swelling and PND.  +exertional chest pain/pressure, palpitations  Gastrointestinal: Negative for heartburn, nausea, vomiting, blood in stool and melena. Genitourinary: Negative for dysuria and flank pain. +bilateral back pain  Musculoskeletal: Negative for joint pain and back pain. +right knee pain  Skin: Negative for rash. Neurological: Negative for focal weakness, seizures, loss of consciousness, weakness and headaches. Endo/Heme/Allergies: Does not bruise/bleed easily. Psychiatric/Behavioral: Negative for memory loss. The patient does not have insomnia. Physical Exam:  Visit Vitals  /60 (BP 1 Location: Right arm, BP Patient Position: Sitting)   Pulse 74   Ht 5' 10\" (1.778 m)   Wt 303 lb (137.4 kg)   BMI 43.48 kg/m²     Wt Readings from Last 3 Encounters:   03/20/19 303 lb (137.4 kg)   03/12/19 308 lb (139.7 kg)   02/28/19 308 lb (139.7 kg)      General - well developed well nourished, morbidly obese BF  Neck - JVP difficult to assess, thyroid nl  Cardiac - normal S1, S2, no murmurs, rubs or gallops. No clicks  Vascular - carotids without bruits, radials, femorals and pedal pulses equal bilateral  Lungs - clear to auscultation bilaterals, no rales, wheezing or rhonchi  Abd - soft nontender, no HSM, no abd bruits  Extremities - 1-2+ pedal edema and pre-tibial edema. Skin - no rash  Neuro - nonfocal  Psych - normal mood and affect    Cardiographics  Event Monitor in 8/2018 demonstrated frequent PVCs  Echo 2/22/19 - EF 66-70%    Written by Lan Grande, as dictated by Dr. Emilee Sierra.      Emilee Sierra MD

## 2019-03-20 ENCOUNTER — OFFICE VISIT (OUTPATIENT)
Dept: CARDIOLOGY CLINIC | Age: 64
End: 2019-03-20

## 2019-03-20 ENCOUNTER — TELEPHONE (OUTPATIENT)
Dept: CARDIOLOGY CLINIC | Age: 64
End: 2019-03-20

## 2019-03-20 VITALS
HEIGHT: 70 IN | SYSTOLIC BLOOD PRESSURE: 110 MMHG | WEIGHT: 293 LBS | DIASTOLIC BLOOD PRESSURE: 60 MMHG | BODY MASS INDEX: 41.95 KG/M2 | HEART RATE: 74 BPM

## 2019-03-20 DIAGNOSIS — K21.9 GASTROESOPHAGEAL REFLUX DISEASE, ESOPHAGITIS PRESENCE NOT SPECIFIED: ICD-10-CM

## 2019-03-20 DIAGNOSIS — I25.118 ATHEROSCLEROSIS OF NATIVE CORONARY ARTERY OF NATIVE HEART WITH STABLE ANGINA PECTORIS (HCC): ICD-10-CM

## 2019-03-20 DIAGNOSIS — Z79.01 WARFARIN ANTICOAGULATION: ICD-10-CM

## 2019-03-20 DIAGNOSIS — J44.9 COPD, SEVERE (HCC): ICD-10-CM

## 2019-03-20 DIAGNOSIS — Z86.718 HX OF DEEP VENOUS THROMBOSIS: ICD-10-CM

## 2019-03-20 DIAGNOSIS — I27.20 PULMONARY HTN (HCC): ICD-10-CM

## 2019-03-20 DIAGNOSIS — I49.3 SYMPTOMATIC PVCS: ICD-10-CM

## 2019-03-20 DIAGNOSIS — E11.22 TYPE 2 DIABETES MELLITUS WITH CHRONIC KIDNEY DISEASE ON CHRONIC DIALYSIS, WITH LONG-TERM CURRENT USE OF INSULIN (HCC): ICD-10-CM

## 2019-03-20 DIAGNOSIS — N20.0 NEPHROLITHIASIS: ICD-10-CM

## 2019-03-20 DIAGNOSIS — J84.9 ILD (INTERSTITIAL LUNG DISEASE) (HCC): ICD-10-CM

## 2019-03-20 DIAGNOSIS — G47.33 OSA ON CPAP: ICD-10-CM

## 2019-03-20 DIAGNOSIS — N18.6 ESRD ON HEMODIALYSIS (HCC): ICD-10-CM

## 2019-03-20 DIAGNOSIS — I10 ESSENTIAL HYPERTENSION: ICD-10-CM

## 2019-03-20 DIAGNOSIS — I50.30 (HFPEF) HEART FAILURE WITH PRESERVED EJECTION FRACTION (HCC): Primary | ICD-10-CM

## 2019-03-20 DIAGNOSIS — N18.6 TYPE 2 DIABETES MELLITUS WITH CHRONIC KIDNEY DISEASE ON CHRONIC DIALYSIS, WITH LONG-TERM CURRENT USE OF INSULIN (HCC): ICD-10-CM

## 2019-03-20 DIAGNOSIS — E66.01 MORBID OBESITY (HCC): Chronic | ICD-10-CM

## 2019-03-20 DIAGNOSIS — Z99.89 OSA ON CPAP: ICD-10-CM

## 2019-03-20 DIAGNOSIS — D64.9 NORMOCYTIC ANEMIA: Chronic | ICD-10-CM

## 2019-03-20 DIAGNOSIS — Z99.2 TYPE 2 DIABETES MELLITUS WITH CHRONIC KIDNEY DISEASE ON CHRONIC DIALYSIS, WITH LONG-TERM CURRENT USE OF INSULIN (HCC): ICD-10-CM

## 2019-03-20 DIAGNOSIS — Z99.2 ESRD ON HEMODIALYSIS (HCC): ICD-10-CM

## 2019-03-20 DIAGNOSIS — G47.33 OBSTRUCTIVE SLEEP APNEA SYNDROME: ICD-10-CM

## 2019-03-20 DIAGNOSIS — Z79.4 TYPE 2 DIABETES MELLITUS WITH CHRONIC KIDNEY DISEASE ON CHRONIC DIALYSIS, WITH LONG-TERM CURRENT USE OF INSULIN (HCC): ICD-10-CM

## 2019-03-20 NOTE — PROGRESS NOTES
Patient has some chest tightness today   Swelling in hands,legs and feet mainly on right side   Palpitations today per patient     Visit Vitals  /60 (BP 1 Location: Right arm, BP Patient Position: Sitting)   Pulse 74   Ht 5' 10\" (1.778 m)   Wt 303 lb (137.4 kg)   BMI 43.48 kg/m²      Patient refuse EKG

## 2019-03-22 ENCOUNTER — TELEPHONE (OUTPATIENT)
Dept: CASE MANAGEMENT | Age: 64
End: 2019-03-22

## 2019-03-26 ENCOUNTER — OFFICE VISIT (OUTPATIENT)
Dept: FAMILY MEDICINE CLINIC | Age: 64
End: 2019-03-26

## 2019-03-26 VITALS
OXYGEN SATURATION: 92 % | WEIGHT: 293 LBS | TEMPERATURE: 97.6 F | SYSTOLIC BLOOD PRESSURE: 103 MMHG | BODY MASS INDEX: 41.95 KG/M2 | HEART RATE: 77 BPM | DIASTOLIC BLOOD PRESSURE: 64 MMHG | RESPIRATION RATE: 16 BRPM | HEIGHT: 70 IN

## 2019-03-26 DIAGNOSIS — Z79.01 ENCOUNTER FOR MONITORING COUMADIN THERAPY: Primary | ICD-10-CM

## 2019-03-26 DIAGNOSIS — Z51.81 ENCOUNTER FOR MONITORING COUMADIN THERAPY: Primary | ICD-10-CM

## 2019-03-26 DIAGNOSIS — Z86.718 HX OF DEEP VENOUS THROMBOSIS: ICD-10-CM

## 2019-03-26 LAB
INR BLD: 4.7
PT POC: 56.9 SECONDS
VALID INTERNAL CONTROL?: YES

## 2019-03-26 RX ORDER — WARFARIN 1 MG/1
TABLET ORAL
Qty: 45 TAB | Refills: 5 | Status: SHIPPED | OUTPATIENT
Start: 2019-03-26 | End: 2019-05-02 | Stop reason: SDUPTHER

## 2019-03-26 NOTE — PROGRESS NOTES
Kevin Landeros is a 66 y.o. female   Chief Complaint   Patient presents with    Follow-up   pt here for her INR check and is currently elevated at 4.7 and states she has not changed anything diet wise. No spontaneous bleeding. Pt is having her renal stones removed on April 3.      she is a 66y.o. year old female who presents for evalution. Reviewed PmHx, RxHx, FmHx, SocHx, AllgHx and updated and dated in the chart. Review of Systems - negative except as listed above in the HPI    Objective:     Vitals:    03/26/19 1440   BP: 103/64   Pulse: 77   Resp: 16   Temp: 97.6 °F (36.4 °C)   TempSrc: Oral   SpO2: 92%   Weight: 306 lb (138.8 kg)   Height: 5' 10\" (1.778 m)       Current Outpatient Medications   Medication Sig    isosorbide mononitrate ER (IMDUR) 120 mg CR tablet TAKE 1 TABLET BY MOUTH EVERY DAY    warfarin (COUMADIN) 1 mg tablet Take 1.5 tab PO qday    oxyCODONE-acetaminophen (PERCOCET) 7.5-325 mg per tablet Take 1 Tab by mouth two (2) times daily as needed for Pain for up to 30 days. Max Daily Amount: 2 Tabs.  pantoprazole (PROTONIX) 40 mg tablet TAKE 1 TABLET BY MOUTH TWICE A DAY FOR HEARTBURN    amLODIPine (NORVASC) 10 mg tablet Take 10 mg by mouth daily.  clopidogrel (PLAVIX) 75 mg tab Take 1 Tab by mouth daily.  diclofenac (VOLTAREN) 1 % gel Apply 2 g to affected area four (4) times daily. As needed for right knee pain    insulin aspart protamine/insulin aspart (NOVOLOG MIX 70-30 U-100 INSULN) 100 unit/mL (70-30) injection 10-40 Units by SubCUTAneous route nightly as needed (BG > 160).  allopurinol (ZYLOPRIM) 100 mg tablet TAKE 1 TABLET BY MOUTH EVERY DAY    nitroglycerin (NITROSTAT) 0.3 mg SL tablet 1 Tab by SubLINGual route every five (5) minutes as needed for Chest Pain.  carvedilol (COREG) 25 mg tablet Take 1.5 Tabs by mouth two (2) times daily (with meals).     albuterol (PROAIR HFA) 90 mcg/actuation inhaler Take 2 Puffs by inhalation every four (4) hours as needed for Wheezing.  ergocalciferol (VITAMIN D2) 50,000 unit capsule Take 50,000 Units by mouth every Tuesday.  atorvastatin (LIPITOR) 80 mg tablet Take 80 mg by mouth nightly. No current facility-administered medications for this visit. Physical Examination: General appearance - alert, well appearing, and in no distress  Chest - clear to auscultation, no wheezes, rales or rhonchi, symmetric air entry  Heart - normal rate, regular rhythm, normal S1, S2, no murmurs, rubs, clicks or gallops      Assessment/ Plan:   Diagnoses and all orders for this visit:    1. Encounter for monitoring Coumadin therapy  -     AMB POC PT/INR    2. Hx of deep venous thrombosis  -     warfarin (COUMADIN) 1 mg tablet; Take 1.5 tab PO qday     skip next 2 days then restart 1.5 q day recheck 1 week  Follow-up and Dispositions    · Return in about 1 week (around 4/2/2019), or if symptoms worsen or fail to improve. I have discussed the diagnosis with the patient and the intended plan as seen in the above orders. The patient has received an after-visit summary and questions were answered concerning future plans. Pt conveyed understanding of plan. Medication Side Effects and Warnings were discussed with patient      Lyla Levine DO   Over 50% of the 15 minutes face to face with Cody Kelley consisted of counseling and/or discussing treatment plans in reference to her inr.

## 2019-03-26 NOTE — PROGRESS NOTES
Chief Complaint   Patient presents with    Follow-up     Patient presents in office today for f/u to INR. No concerns. 1. Have you been to the ER, urgent care clinic since your last visit? Hospitalized since your last visit? No    2. Have you seen or consulted any other health care providers outside of the [address] [place] [address] since your last visit? Include any pap smears or colon screening.  No    Learning Assessment 3/20/2018   PRIMARY LEARNER Patient   PRIMARY LANGUAGE ENGLISH   LEARNER PREFERENCE PRIMARY LISTENING   ANSWERED BY patient    RELATIONSHIP SELF

## 2019-03-26 NOTE — PATIENT INSTRUCTIONS
High INR Test Result: Care Instructions  Your Care Instructions    You had a blood test to check how long it takes your blood to clot. This test is called a PT or prothrombin time test. The result of the test is called the INR level. A high INR level can happen when you take warfarin (Coumadin). Warfarin helps prevent blood clots. To do this, it slows the amount of time it takes for your blood to clot. This raises your INR level. The INR goal for people who take warfarin is usually from 2 to 3. A value higher than 3.5 increases the risk of bleeding problems. Many things can affect the way warfarin works. Some natural health products and other medicines can make warfarin work too well. That can raise the risk of bleeding. If you drink a lot of alcohol, that may raise your INR. And severe diarrhea or vomiting can also raise your INR. The best way to lower your INR will depend on several things. In some cases, the doctor may have you stop taking warfarin for a few days. You may also be given other medicines to take. You will need to be tested often to make sure your INR level is going down. You will also need to watch for signs of bleeding. The doctor has checked you carefully, but problems can develop later. If you notice any problems or new symptoms, get medical treatment right away. Follow-up care is a key part of your treatment and safety. Be sure to make and go to all appointments, and call your doctor if you are having problems. It's also a good idea to know your test results and keep a list of the medicines you take. How can you care for yourself at home? Be careful with medicines and foods  · Don't start or stop taking any medicines, vitamins, or natural remedies unless you first talk to your doctor. · Keep the amount of vitamin K in your diet about the same from day to day. Do not suddenly eat a lot more or a lot less food that is rich in vitamin K than you usually do.  Vitamin K affects how warfarin works and how your blood clots. · Limit your use of alcohol. Avoid bleeding by preventing falls  · Wear slippers or shoes with nonskid soles. · Remove throw rugs and clutter. · Rearrange furniture and electrical cords to keep them out of walking paths. · Keep stairways, porches, and outside walkways well lit. Use night-lights in hallways and bathrooms. · Be extra careful when you work with sharp tools or knives. When should you call for help? Call 911 anytime you think you may need emergency care. For example, call if:    · You passed out (lost consciousness).     · You have signs of severe bleeding, such as:  ? A severe headache that is different from past headaches. ? Vomiting blood or what looks like coffee grounds. ? Passing maroon or very bloody stools.    Call your doctor now or seek immediate medical care if:    · You have unexpected bleeding, including:  ? Blood in stools or black stools that look like tar.  ? Blood in your urine. ? Bruises or blood spots under the skin.     · You feel dizzy or lightheaded.    Watch closely for changes in your health, and be sure to contact your doctor if:    · You do not get better as expected. Where can you learn more? Go to http://lindsay-kai.info/. Enter C178 in the search box to learn more about \"High INR Test Result: Care Instructions. \"  Current as of: July 22, 2018  Content Version: 11.9  © 3939-5406 [employer]. Care instructions adapted under license by [employer] (which disclaims liability or warranty for this information). If you have questions about a medical condition or this instruction, always ask your healthcare professional. [place] [address] any warranty or liability for your use of this information.

## 2019-03-27 LAB
ALBUMIN SERPL-MCNC: 3.5 G/DL (ref 3.6–4.8)
ALP SERPL-CCNC: 137 IU/L (ref 39–117)
ALT SERPL-CCNC: 10 IU/L (ref 0–32)
AMPHETAMINES SERPL QL SCN: NEGATIVE NG/ML
AST SERPL-CCNC: 12 IU/L (ref 0–40)
BARBITURATES SERPL QL SCN: NEGATIVE UG/ML
BENZODIAZ SERPL QL SCN: NEGATIVE NG/ML
BILIRUB DIRECT SERPL-MCNC: 0.19 MG/DL (ref 0–0.4)
BILIRUB SERPL-MCNC: 0.5 MG/DL (ref 0–1.2)
CANNABINOIDS SERPL QL SCN: NEGATIVE NG/ML
CHOLEST SERPL-MCNC: 139 MG/DL (ref 100–199)
COCAINE+BZE SERPL QL SCN: NEGATIVE NG/ML
ETHANOL UR-MCNC: NEGATIVE GM/DL
HDLC SERPL-MCNC: 56 MG/DL
INTERPRETATION, 910389: NORMAL
LDLC SERPL CALC-MCNC: 67 MG/DL (ref 0–99)
METHADONE SERPL QL SCN: NEGATIVE NG/ML
OPIATES SERPL QL SCN: NEGATIVE NG/ML
OXYCOCONE: 3.4 NG/ML
OXYCODONES CONFIRMATION, 738393: POSITIVE
OXYCODONES, 738315: ABNORMAL NG/ML
OXYMORPHONE, 763902: NEGATIVE NG/ML
PCP SERPL QL SCN: NEGATIVE NG/ML
PDF IMAGE, 910387: NORMAL
PROPOXYPH SERPL QL SCN: NEGATIVE NG/ML
PROT SERPL-MCNC: 6.6 G/DL (ref 6–8.5)
TRIGL SERPL-MCNC: 80 MG/DL (ref 0–149)
VLDLC SERPL CALC-MCNC: 16 MG/DL (ref 5–40)

## 2019-04-11 ENCOUNTER — OFFICE VISIT (OUTPATIENT)
Dept: FAMILY MEDICINE CLINIC | Age: 64
End: 2019-04-11

## 2019-04-11 VITALS
RESPIRATION RATE: 16 BRPM | OXYGEN SATURATION: 98 % | WEIGHT: 293 LBS | TEMPERATURE: 98.2 F | SYSTOLIC BLOOD PRESSURE: 96 MMHG | HEART RATE: 64 BPM | HEIGHT: 70 IN | DIASTOLIC BLOOD PRESSURE: 60 MMHG | BODY MASS INDEX: 41.95 KG/M2

## 2019-04-11 DIAGNOSIS — I20.0 UNSTABLE ANGINA (HCC): ICD-10-CM

## 2019-04-11 DIAGNOSIS — Z79.01 ENCOUNTER FOR MONITORING COUMADIN THERAPY: Primary | ICD-10-CM

## 2019-04-11 DIAGNOSIS — G89.4 CHRONIC PAIN SYNDROME: ICD-10-CM

## 2019-04-11 DIAGNOSIS — Z51.81 ENCOUNTER FOR MONITORING COUMADIN THERAPY: Primary | ICD-10-CM

## 2019-04-11 LAB
INR BLD: 2.1
PT POC: 24.7 SECONDS
VALID INTERNAL CONTROL?: YES

## 2019-04-11 RX ORDER — OXYCODONE AND ACETAMINOPHEN 7.5; 325 MG/1; MG/1
1 TABLET ORAL
Qty: 60 TAB | Refills: 0 | Status: SHIPPED | OUTPATIENT
Start: 2019-04-11 | End: 2019-05-02 | Stop reason: SDUPTHER

## 2019-04-11 NOTE — PATIENT INSTRUCTIONS
Constipation: Care Instructions Your Care Instructions Constipation means that you have a hard time passing stools (bowel movements). People pass stools from 3 times a day to once every 3 days. What is normal for you may be different. Constipation may occur with pain in the rectum and cramping. The pain may get worse when you try to pass stools. Sometimes there are small amounts of bright red blood on toilet paper or the surface of stools. This is because of enlarged veins near the rectum (hemorrhoids). A few changes in your diet and lifestyle may help you avoid ongoing constipation. Your doctor may also prescribe medicine to help loosen your stool. Some medicines can cause constipation. These include pain medicines and antidepressants. Tell your doctor about all the medicines you take. Your doctor may want to make a medicine change to ease your symptoms. Follow-up care is a key part of your treatment and safety. Be sure to make and go to all appointments, and call your doctor if you are having problems. It's also a good idea to know your test results and keep a list of the medicines you take. How can you care for yourself at home? · Drink plenty of fluids, enough so that your urine is light yellow or clear like water. If you have kidney, heart, or liver disease and have to limit fluids, talk with your doctor before you increase the amount of fluids you drink. · Include high-fiber foods in your diet each day. These include fruits, vegetables, beans, and whole grains. · Get at least 30 minutes of exercise on most days of the week. Walking is a good choice. You also may want to do other activities, such as running, swimming, cycling, or playing tennis or team sports. · Take a fiber supplement, such as Citrucel or Metamucil, every day. Read and follow all instructions on the label. · Schedule time each day for a bowel movement. A daily routine may help. Take your time having your bowel movement. · Support your feet with a small step stool when you sit on the toilet. This helps flex your hips and places your pelvis in a squatting position. · Your doctor may recommend an over-the-counter laxative to relieve your constipation. Examples are Milk of Magnesia and MiraLax. Read and follow all instructions on the label. Do not use laxatives on a long-term basis. When should you call for help? Call your doctor now or seek immediate medical care if: 
  · You have new or worse belly pain.  
  · You have new or worse nausea or vomiting.  
  · You have blood in your stools.  
 Watch closely for changes in your health, and be sure to contact your doctor if: 
  · Your constipation is getting worse.  
  · You do not get better as expected. Where can you learn more? Go to http://lindsay-kai.info/. Enter 21 [id number] in the search box to learn more about \"Constipation: Care Instructions. \" Current as of: September 23, 2018 Content Version: 11.9 © 2364-0579 [employer]. Care instructions adapted under license by [employer] (which disclaims liability or warranty for this information). If you have questions about a medical condition or this instruction, always ask your healthcare professional. [place] [address] any warranty or liability for your use of this information. Body Mass Index: Care Instructions Your Care Instructions Body mass index (BMI) can help you see if your weight is raising your risk for health problems. It uses a formula to compare how much you weigh with how tall you are. · A BMI lower than 18.5 is considered underweight. · A BMI between 18.5 and 24.9 is considered healthy. · A BMI between 25 and 29.9 is considered overweight. A BMI of 30 or higher is considered obese. If your BMI is in the normal range, it means that you have a lower risk for weight-related health problems.  If your BMI is in the overweight or obese range, you may be at increased risk for weight-related health problems, such as high blood pressure, heart disease, stroke, arthritis or joint pain, and diabetes. If your BMI is in the underweight range, you may be at increased risk for health problems such as fatigue, lower protection (immunity) against illness, muscle loss, bone loss, hair loss, and hormone problems. BMI is just one measure of your risk for weight-related health problems. You may be at higher risk for health problems if you are not active, you eat an unhealthy diet, or you drink too much alcohol or use tobacco products. Follow-up care is a key part of your treatment and safety. Be sure to make and go to all appointments, and call your doctor if you are having problems. It's also a good idea to know your test results and keep a list of the medicines you take. How can you care for yourself at home? · Practice healthy eating habits. This includes eating plenty of fruits, vegetables, whole grains, lean protein, and low-fat dairy. · If your doctor recommends it, get more exercise. Walking is a good choice. Bit by bit, increase the amount you walk every day. Try for at least 30 minutes on most days of the week. · Do not smoke. Smoking can increase your risk for health problems. If you need help quitting, talk to your doctor about stop-smoking programs and medicines. These can increase your chances of quitting for good. · Limit alcohol to 2 drinks a day for men and 1 drink a day for women. Too much alcohol can cause health problems. If you have a BMI higher than 25 · Your doctor may do other tests to check your risk for weight-related health problems. This may include measuring the distance around your waist. A waist measurement of more than 40 inches in men or 35 inches in women can increase the risk of weight-related health problems.  
· Talk with your doctor about steps you can take to stay healthy or improve your health. You may need to make lifestyle changes to lose weight and stay healthy, such as changing your diet and getting regular exercise. If you have a BMI lower than 18.5 · Your doctor may do other tests to check your risk for health problems. · Talk with your doctor about steps you can take to stay healthy or improve your health. You may need to make lifestyle changes to gain or maintain weight and stay healthy, such as getting more healthy foods in your diet and doing exercises to build muscle. Where can you learn more? Go to http://lindsay-kai.info/. Enter S176 in the search box to learn more about \"Body Mass Index: Care Instructions. \" Current as of: October 13, 2016 Content Version: 11.4 © 5134-8582 [employer]. Care instructions adapted under license by [employer] (which disclaims liability or warranty for this information). If you have questions about a medical condition or this instruction, always ask your healthcare professional. [address] any warranty or liability for your use of this information.

## 2019-04-11 NOTE — PROGRESS NOTES
Chica Thompson is a 66 y.o. female Chief Complaint Patient presents with  Follow-up  Medication Refill Percocet Pt here for INR check and currently at 2.1. This is goal for pt and will continue with current dosing and recheck in 3 weeks. Pt also reports having constipation. She is currently using prune juice, miralax, senna, enemas. Pt can't afford the Rx meds and is on dialysis and should avoid mag citrate and MoM. Pt is requesting refill of percocet. Pt is also requesting refill of her pain medication used for her chronic chest pain. Pt has no hx of abuse and this brings her pain from a 8-9/10 to a 4-5/10. Pt is on multiple blood thinners and is not a candidate for nsaid therapy. This allows pt to help around her home, tho she is severely limited by her oxygen dependency and weakness from ESRD/dialysis. Pt did receive pain medication after her procedure.   
Opioid/Pain Management: 
  
1. Has the patient signed a pain contract for chronic narcotic use? yes 2. Has the patient had a UDS or Serum screen as per guidelines as per [facility]?:   yes 3. Does patient meet necessary guidelines for Naloxone treatement per the [facility]?:    no 
4. Has the Prescription Monitoring Program been reviewed? yes 5. Does patient have a long term condition that requires long term use of a Narcotic?  yes 6. Has patient been tolerant of therapy and responsible to routine follow up and specialist follow-up? Yes 
  
 
 
she is a 66y.o. year old female who presents for evalution. Reviewed PmHx, RxHx, FmHx, SocHx, AllgHx and updated and dated in the chart. Review of Systems - negative except as listed above in the HPI Objective:  
 
Vitals:  
 04/11/19 1117 BP: 96/60 Pulse: 64 Resp: 16 Temp: 98.2 °F (36.8 °C) TempSrc: Oral  
SpO2: 98% Weight: 303 lb (137.4 kg) Height: 5' 10\" (1.778 m) Current Outpatient Medications Medication Sig  
  oxyCODONE-acetaminophen (PERCOCET) 7.5-325 mg per tablet Take 1 Tab by mouth two (2) times daily as needed for Pain for up to 30 days. Max Daily Amount: 2 Tabs.  isosorbide mononitrate ER (IMDUR) 120 mg CR tablet TAKE 1 TABLET BY MOUTH EVERY DAY  warfarin (COUMADIN) 1 mg tablet Take 1.5 tab PO qday  pantoprazole (PROTONIX) 40 mg tablet TAKE 1 TABLET BY MOUTH TWICE A DAY FOR HEARTBURN  
 amLODIPine (NORVASC) 10 mg tablet Take 10 mg by mouth daily.  clopidogrel (PLAVIX) 75 mg tab Take 1 Tab by mouth daily.  diclofenac (VOLTAREN) 1 % gel Apply 2 g to affected area four (4) times daily. As needed for right knee pain  insulin aspart protamine/insulin aspart (NOVOLOG MIX 70-30 U-100 INSULN) 100 unit/mL (70-30) injection 10-40 Units by SubCUTAneous route nightly as needed (BG > 160).  allopurinol (ZYLOPRIM) 100 mg tablet TAKE 1 TABLET BY MOUTH EVERY DAY  nitroglycerin (NITROSTAT) 0.3 mg SL tablet 1 Tab by SubLINGual route every five (5) minutes as needed for Chest Pain.  carvedilol (COREG) 25 mg tablet Take 1.5 Tabs by mouth two (2) times daily (with meals).  albuterol (PROAIR HFA) 90 mcg/actuation inhaler Take 2 Puffs by inhalation every four (4) hours as needed for Wheezing.  ergocalciferol (VITAMIN D2) 50,000 unit capsule Take 50,000 Units by mouth every Tuesday.  atorvastatin (LIPITOR) 80 mg tablet Take 80 mg by mouth nightly. No current facility-administered medications for this visit. Physical Examination: General appearance - alert, well appearing, and in no distress Chest - clear to auscultation, no wheezes, rales or rhonchi, symmetric air entry Heart - normal rate, regular rhythm, normal S1, S2, no murmurs, rubs, clicks or gallops Assessment/ Plan:  
Diagnoses and all orders for this visit: 1. Encounter for monitoring Coumadin therapy -     AMB POC PT/INR 
C/w current dosing recheck 3 weeks 2. Chronic pain syndrome -     oxyCODONE-acetaminophen (PERCOCET) 7.5-325 mg per tablet; Take 1 Tab by mouth two (2) times daily as needed for Pain for up to 30 days. Max Daily Amount: 2 Tabs. 3. Unstable angina (HCC) 
-     oxyCODONE-acetaminophen (PERCOCET) 7.5-325 mg per tablet; Take 1 Tab by mouth two (2) times daily as needed for Pain for up to 30 days. Max Daily Amount: 2 Tabs. no change in therapy indicated Follow-up and Dispositions · Return in about 3 weeks (around 5/2/2019), or if symptoms worsen or fail to improve. I have discussed the diagnosis with the patient and the intended plan as seen in the above orders. The patient has received an after-visit summary and questions were answered concerning future plans. Pt conveyed understanding of plan. Medication Side Effects and Warnings were discussed with patient [id number] [facility] [address] DO Discussed the patient's BMI with her. The BMI follow up plan is as follows:  
 
dietary management education, guidance, and counseling 
encourage exercise 
monitor weight 
prescribed dietary intake An After Visit Summary was printed and given to the patient.

## 2019-04-11 NOTE — PROGRESS NOTES
Chief Complaint Patient presents with  Follow-up  Medication Refill Percocet Patient present in office today for f/u to INR. Has c/o not having a BM. She states she has had one BM in two weeks. Needs a refill of percocet. No other concerns. 1. Have you been to the ER, urgent care clinic since your last visit? Hospitalized since your last visit? Yes When: 4/8/19 retreat for bleeding after lithotripsy 2. Have you seen or consulted any other health care providers outside of the [address] [place] [address] since your last visit? Include any pap smears or colon screening. No 
 
Learning Assessment 3/20/2018 PRIMARY LEARNER Patient PRIMARY LANGUAGE ENGLISH  
LEARNER PREFERENCE PRIMARY LISTENING  
ANSWERED BY patient RELATIONSHIP SELF

## 2019-04-19 ENCOUNTER — TELEPHONE (OUTPATIENT)
Dept: CASE MANAGEMENT | Age: 64
End: 2019-04-19

## 2019-04-19 NOTE — TELEPHONE ENCOUNTER
Spoke with Ms. Joceline Chong. She states she has been wiped out from dialysis and has not been able to do readings. Encouraged her to do readings on off dialysis days even if it is two times a week. Ms. Joceline Chong states she will do [facility] readings 3 times a week on Tuesday, Thursday and Saturday.

## 2019-05-01 ENCOUNTER — HOSPITAL ENCOUNTER (OUTPATIENT)
Dept: GENERAL RADIOLOGY | Age: 64
Discharge: HOME | End: 2019-05-01
Payer: MEDICARE

## 2019-05-01 DIAGNOSIS — M25.579 ANKLE PAIN: ICD-10-CM

## 2019-05-01 PROCEDURE — 73610 X-RAY EXAM OF ANKLE: CPT

## 2019-05-02 ENCOUNTER — OFFICE VISIT (OUTPATIENT)
Dept: FAMILY MEDICINE CLINIC | Age: 64
End: 2019-05-02

## 2019-05-02 ENCOUNTER — HOSPITAL ENCOUNTER (OUTPATIENT)
Dept: LAB | Age: 64
Discharge: HOME | End: 2019-05-02
Payer: MEDICARE

## 2019-05-02 VITALS
BODY MASS INDEX: 43.48 KG/M2 | SYSTOLIC BLOOD PRESSURE: 110 MMHG | HEART RATE: 68 BPM | DIASTOLIC BLOOD PRESSURE: 64 MMHG | OXYGEN SATURATION: 92 % | TEMPERATURE: 98.7 F | RESPIRATION RATE: 16 BRPM | HEIGHT: 70 IN

## 2019-05-02 DIAGNOSIS — G89.4 CHRONIC PAIN SYNDROME: ICD-10-CM

## 2019-05-02 DIAGNOSIS — I20.0 UNSTABLE ANGINA (HCC): ICD-10-CM

## 2019-05-02 DIAGNOSIS — S82.891A CLOSED FRACTURE OF RIGHT ANKLE, INITIAL ENCOUNTER: ICD-10-CM

## 2019-05-02 DIAGNOSIS — Z79.01 WARFARIN ANTICOAGULATION: Primary | ICD-10-CM

## 2019-05-02 DIAGNOSIS — E23.7 PITUITARY ABNORMALITY (HCC): ICD-10-CM

## 2019-05-02 DIAGNOSIS — Z86.718 HX OF DEEP VENOUS THROMBOSIS: ICD-10-CM

## 2019-05-02 LAB
INR BLD: 1.5
PT POC: NORMAL SECONDS
VALID INTERNAL CONTROL?: YES

## 2019-05-02 PROCEDURE — 83970 ASSAY OF PARATHORMONE: CPT

## 2019-05-02 PROCEDURE — 82310 ASSAY OF CALCIUM: CPT

## 2019-05-02 RX ORDER — OXYCODONE AND ACETAMINOPHEN 7.5; 325 MG/1; MG/1
1 TABLET ORAL
Qty: 60 TAB | Refills: 0 | Status: SHIPPED | OUTPATIENT
Start: 2019-05-11 | End: 2019-06-10

## 2019-05-02 RX ORDER — WARFARIN 1 MG/1
TABLET ORAL
Qty: 45 TAB | Refills: 5 | Status: SHIPPED | OUTPATIENT
Start: 2019-05-02 | End: 2019-05-02

## 2019-05-02 RX ORDER — WARFARIN 2 MG/1
2 TABLET ORAL DAILY
Qty: 90 TAB | Refills: 3 | Status: SHIPPED | OUTPATIENT
Start: 2019-05-02 | End: 2019-05-21

## 2019-05-02 NOTE — PROGRESS NOTES
Cecilia Degroot is a 66 y.o. female had concerns including Labs (wants INR checked) and Medication Refill. Pt here for recheck of her INR and her INR today is 1.5 taking 1.5mg daily will increase to 2mg and recheck 2 weeks Pt also broke her ankle while in [place], states he knee gave out and now it is casted pt is being managed by [facility] currently. Request refill of pain medication she is on chronically. See prior note for full Q3 month evaluation. Pt also reports she was told her parathyroid isn't working right. Does not have more info than this states was told by nephro will check labs today for pth and Ca 
she is a 66y.o. year old female who presents for evalution. Reviewed PmHx, RxHx, FmHx, SocHx, AllgHx and updated and dated in the chart. Review of Systems - negative except as listed above in the HPI Objective:  
 
Vitals:  
 05/02/19 1345 BP: 110/64 Pulse: 68 Resp: 16 Temp: 98.7 °F (37.1 °C) TempSrc: Oral  
SpO2: 92% Height: 5' 10\" (1.778 m) Current Outpatient Medications Medication Sig  [START ON 5/11/2019] oxyCODONE-acetaminophen (PERCOCET) 7.5-325 mg per tablet Take 1 Tab by mouth two (2) times daily as needed for Pain for up to 30 days. Max Daily Amount: 2 Tabs.  warfarin (COUMADIN) 2 mg tablet Take 1 Tab by mouth daily. Indications: lung embolism  isosorbide mononitrate ER (IMDUR) 120 mg CR tablet TAKE 1 TABLET BY MOUTH EVERY DAY  pantoprazole (PROTONIX) 40 mg tablet TAKE 1 TABLET BY MOUTH TWICE A DAY FOR HEARTBURN  
 amLODIPine (NORVASC) 10 mg tablet Take 10 mg by mouth daily.  clopidogrel (PLAVIX) 75 mg tab Take 1 Tab by mouth daily.  diclofenac (VOLTAREN) 1 % gel Apply 2 g to affected area four (4) times daily. As needed for right knee pain  insulin aspart protamine/insulin aspart (NOVOLOG MIX 70-30 U-100 INSULN) 100 unit/mL (70-30) injection 10-40 Units by SubCUTAneous route nightly as needed (BG > 160).  allopurinol (ZYLOPRIM) 100 mg tablet TAKE 1 TABLET BY MOUTH EVERY DAY  nitroglycerin (NITROSTAT) 0.3 mg SL tablet 1 Tab by SubLINGual route every five (5) minutes as needed for Chest Pain.  carvedilol (COREG) 25 mg tablet Take 1.5 Tabs by mouth two (2) times daily (with meals).  albuterol (PROAIR HFA) 90 mcg/actuation inhaler Take 2 Puffs by inhalation every four (4) hours as needed for Wheezing.  ergocalciferol (VITAMIN D2) 50,000 unit capsule Take 50,000 Units by mouth every Tuesday.  atorvastatin (LIPITOR) 80 mg tablet Take 80 mg by mouth nightly. No current facility-administered medications for this visit. Physical Examination: General appearance - alert, well appearing, and in no distress Chest - clear to auscultation, no wheezes, rales or rhonchi, symmetric air entry Heart - normal rate, regular rhythm, normal S1, S2, no murmurs, rubs, clicks or gallops R lower leg to foot casted Assessment/ Plan:  
Diagnoses and all orders for this visit: 
 
1. Warfarin anticoagulation -     AMB POC PT/INR 
 
2. Chronic pain syndrome 
-     oxyCODONE-acetaminophen (PERCOCET) 7.5-325 mg per tablet; Take 1 Tab by mouth two (2) times daily as needed for Pain for up to 30 days. Max Daily Amount: 2 Tabs. 3. Unstable angina (HCC) 
-     oxyCODONE-acetaminophen (PERCOCET) 7.5-325 mg per tablet; Take 1 Tab by mouth two (2) times daily as needed for Pain for up to 30 days. Max Daily Amount: 2 Tabs. 4. Hx of deep venous thrombosis -     warfarin (COUMADIN) 2 mg tablet; Take 1 Tab by mouth daily. Indications: lung embolism 5. Closed fracture of right ankle, initial encounter 6. Pituitary abnormality (HCC) 
-     PTH INTACT 
-     CALCIUM 
 
 following Follow-up and Dispositions · Return in about 2 weeks (around 5/16/2019), or if symptoms worsen or fail to improve.  
  
 
 
 
I have discussed the diagnosis with the patient and the intended plan as seen in the above orders. The patient has received an after-visit summary and questions were answered concerning future plans. Pt conveyed understanding of plan. Medication Side Effects and Warnings were discussed with patient Clearence Plana, DO

## 2019-05-02 NOTE — PATIENT INSTRUCTIONS
A Healthy Lifestyle: Care Instructions Your Care Instructions A healthy lifestyle can help you feel good, stay at a healthy weight, and have plenty of energy for both work and play. A healthy lifestyle is something you can share with your whole family. A healthy lifestyle also can lower your risk for serious health problems, such as high blood pressure, heart disease, and diabetes. You can follow a few steps listed below to improve your health and the health of your family. Follow-up care is a key part of your treatment and safety. Be sure to make and go to all appointments, and call your doctor if you are having problems. It's also a good idea to know your test results and keep a list of the medicines you take. How can you care for yourself at home? · Do not eat too much sugar, fat, or fast foods. You can still have dessert and treats now and then. The goal is moderation. · Start small to improve your eating habits. Pay attention to portion sizes, drink less juice and soda pop, and eat more fruits and vegetables. ? Eat a healthy amount of food. A 3-ounce serving of meat, for example, is about the size of a deck of cards. Fill the rest of your plate with vegetables and whole grains. ? Limit the amount of soda and sports drinks you have every day. Drink more water when you are thirsty. ? Eat at least 5 servings of fruits and vegetables every day. It may seem like a lot, but it is not hard to reach this goal. A serving or helping is 1 piece of fruit, 1 cup of vegetables, or 2 cups of leafy, raw vegetables. Have an apple or some carrot sticks as an afternoon snack instead of a candy bar. Try to have fruits and/or vegetables at every meal. 
· Make exercise part of your daily routine. You may want to start with simple activities, such as walking, bicycling, or slow swimming. Try to be active 30 to 60 minutes every day.  You do not need to do all 30 to 60 minutes all at once. For example, you can exercise 3 times a day for 10 or 20 minutes. Moderate exercise is safe for most people, but it is always a good idea to talk to your doctor before starting an exercise program. 
· Keep moving. Breanne Hu the lawn, work in the garden, or [place]. Take the stairs instead of the elevator at work. · If you smoke, quit. People who smoke have an increased risk for heart attack, stroke, cancer, and other lung illnesses. Quitting is hard, but there are ways to boost your chance of quitting tobacco for good. ? Use nicotine gum, patches, or lozenges. ? Ask your doctor about stop-smoking programs and medicines. ? Keep trying. In addition to reducing your risk of diseases in the future, you will notice some benefits soon after you stop using tobacco. If you have shortness of breath or asthma symptoms, they will likely get better within a few weeks after you quit. · Limit how much alcohol you drink. Moderate amounts of alcohol (up to 2 drinks a day for men, 1 drink a day for women) are okay. But drinking too much can lead to liver problems, high blood pressure, and other health problems. Family health If you have a family, there are many things you can do together to improve your health. · Eat meals together as a family as often as possible. · Eat healthy foods. This includes fruits, vegetables, lean meats and dairy, and whole grains. · Include your family in your fitness plan. Most people think of activities such as jogging or tennis as the way to fitness, but there are many ways you and your family can be more active. Anything that makes you breathe hard and gets your heart pumping is exercise. Here are some tips: 
? Walk to do errands or to take your child to school or the bus. 
? Go for a family bike ride after dinner instead of watching TV. Where can you learn more? Go to http://lindsay-kai.info/. Enter Z059 in the search box to learn more about \"A Healthy Lifestyle: Care Instructions. \" Current as of: September 11, 2018 Content Version: 11.9 © 2442-4763 [employer]. Care instructions adapted under license by [employer] (which disclaims liability or warranty for this information). If you have questions about a medical condition or this instruction, always ask your healthcare professional. [place] [address] any warranty or liability for your use of this information.

## 2019-05-02 NOTE — PROGRESS NOTES
Identified pt with two pt identifiers(name and ). Reviewed record in preparation for visit and have obtained necessary documentation. Chief Complaint Patient presents with  Labs  
  wants INR checked  Medication Refill Health Maintenance Due Topic  Pneumococcal 0-64 years (1 of 3 - PCV13)  FOOT EXAM Q1   
 DTaP/Tdap/Td series (1 - Tdap)  PAP AKA CERVICAL CYTOLOGY  Shingrix Vaccine Age 50> (1 of 2)  BREAST CANCER SCRN MAMMOGRAM   
 FOBT Q 1 YEAR AGE 50-75 Coordination of Care Questionnaire: 
:  
1) Have you been to an emergency room, urgent care, or hospitalized since your last visit? If yes, where when, and reason for visit? yes  In [address] for broken right ankle 2. Have seen or consulted any other health care provider since your last visit? If yes, where when, and reason for visit? NO 
 
 
3) Do you have an Advanced Directive/ Living Will in place? NO If yes, do we have a copy on file NO If no, would you like information NO Patient is accompanied by  I have received verbal consent from Mars Callahan to discuss any/all medical information while they are present in the room. Visit Vitals /64 (BP 1 Location: Right arm, BP Patient Position: Sitting) Pulse 68 Temp 98.7 °F (37.1 °C) (Oral) Resp 16 Ht 5' 10\" (1.778 m) SpO2 92% BMI 43.48 kg/m² Cande Molina LPN

## 2019-05-03 LAB
CALCIUM SERPL-MCNC: 8.8 MG/DL (ref 8.7–10.3)
PTH-INTACT SERPL-MCNC: 310 PG/ML (ref 15–65)

## 2019-05-06 ENCOUNTER — TELEPHONE (OUTPATIENT)
Dept: FAMILY MEDICINE CLINIC | Age: 64
End: 2019-05-06

## 2019-05-06 NOTE — TELEPHONE ENCOUNTER
Itzel calling from [employer] and states that pt wants to get her medication Percocet refilled early. Please call them back at [phone]. Pt states she fx her ankle and needs them.

## 2019-05-08 ENCOUNTER — HOSPITAL ENCOUNTER (OUTPATIENT)
Dept: GENERAL RADIOLOGY | Age: 64
Discharge: HOME | End: 2019-05-08
Payer: MEDICARE

## 2019-05-08 DIAGNOSIS — S82.64XA: ICD-10-CM

## 2019-05-08 PROCEDURE — 73610 X-RAY EXAM OF ANKLE: CPT

## 2019-05-11 DIAGNOSIS — M10.372 ACUTE GOUT DUE TO RENAL IMPAIRMENT INVOLVING LEFT FOOT: ICD-10-CM

## 2019-05-13 RX ORDER — ALLOPURINOL 100 MG/1
TABLET ORAL
Qty: 30 TAB | Refills: 4 | Status: SHIPPED | OUTPATIENT
Start: 2019-05-13 | End: 2020-03-10

## 2019-05-15 ENCOUNTER — HOSPITAL ENCOUNTER (OUTPATIENT)
Dept: GENERAL RADIOLOGY | Age: 64
Discharge: HOME | End: 2019-05-15
Payer: MEDICARE

## 2019-05-15 DIAGNOSIS — M25.579 ANKLE PAIN: ICD-10-CM

## 2019-05-15 PROCEDURE — 73610 X-RAY EXAM OF ANKLE: CPT

## 2019-05-16 ENCOUNTER — TELEPHONE (OUTPATIENT)
Dept: CARDIOLOGY CLINIC | Age: 64
End: 2019-05-16

## 2019-05-16 ENCOUNTER — HOSPITAL ENCOUNTER (OUTPATIENT)
Dept: LAB | Age: 64
Discharge: HOME | End: 2019-05-16
Payer: MEDICARE

## 2019-05-16 ENCOUNTER — OFFICE VISIT (OUTPATIENT)
Dept: FAMILY MEDICINE CLINIC | Age: 64
End: 2019-05-16

## 2019-05-16 VITALS
DIASTOLIC BLOOD PRESSURE: 63 MMHG | HEART RATE: 72 BPM | OXYGEN SATURATION: 92 % | TEMPERATURE: 98 F | WEIGHT: 293 LBS | HEIGHT: 70 IN | RESPIRATION RATE: 16 BRPM | SYSTOLIC BLOOD PRESSURE: 99 MMHG | BODY MASS INDEX: 41.95 KG/M2

## 2019-05-16 DIAGNOSIS — Z01.818 PRE-OP EVALUATION: Primary | ICD-10-CM

## 2019-05-16 DIAGNOSIS — Z79.01 ENCOUNTER FOR MONITORING COUMADIN THERAPY: ICD-10-CM

## 2019-05-16 DIAGNOSIS — Z51.81 ENCOUNTER FOR MONITORING COUMADIN THERAPY: ICD-10-CM

## 2019-05-16 DIAGNOSIS — L02.92 BOIL: ICD-10-CM

## 2019-05-16 LAB
INR BLD: 2.7
PT POC: 32.2 SECONDS
VALID INTERNAL CONTROL?: YES

## 2019-05-16 PROCEDURE — 85018 HEMOGLOBIN: CPT

## 2019-05-16 RX ORDER — MUPIROCIN 20 MG/G
OINTMENT TOPICAL 4 TIMES DAILY
Qty: 22 G | Refills: 1 | Status: SHIPPED | OUTPATIENT
Start: 2019-05-16 | End: 2019-12-13

## 2019-05-16 NOTE — TELEPHONE ENCOUNTER
Patient would like to speak with you to see if you received a paper that was brought to the office.  She stated she needs to know if she needs to be seen or not   Phone: [phone]

## 2019-05-16 NOTE — PATIENT INSTRUCTIONS
A Healthy Lifestyle: Care Instructions  Your Care Instructions    A healthy lifestyle can help you feel good, stay at a healthy weight, and have plenty of energy for both work and play. A healthy lifestyle is something you can share with your whole family. A healthy lifestyle also can lower your risk for serious health problems, such as high blood pressure, heart disease, and diabetes. You can follow a few steps listed below to improve your health and the health of your family. Follow-up care is a key part of your treatment and safety. Be sure to make and go to all appointments, and call your doctor if you are having problems. It's also a good idea to know your test results and keep a list of the medicines you take. How can you care for yourself at home? · Do not eat too much sugar, fat, or fast foods. You can still have dessert and treats now and then. The goal is moderation. · Start small to improve your eating habits. Pay attention to portion sizes, drink less juice and soda pop, and eat more fruits and vegetables. ? Eat a healthy amount of food. A 3-ounce serving of meat, for example, is about the size of a deck of cards. Fill the rest of your plate with vegetables and whole grains. ? Limit the amount of soda and sports drinks you have every day. Drink more water when you are thirsty. ? Eat at least 5 servings of fruits and vegetables every day. It may seem like a lot, but it is not hard to reach this goal. A serving or helping is 1 piece of fruit, 1 cup of vegetables, or 2 cups of leafy, raw vegetables. Have an apple or some carrot sticks as an afternoon snack instead of a candy bar. Try to have fruits and/or vegetables at every meal.  · Make exercise part of your daily routine. You may want to start with simple activities, such as walking, bicycling, or slow swimming. Try to be active 30 to 60 minutes every day. You do not need to do all 30 to 60 minutes all at once.  For example, you can exercise 3 times a day for 10 or 20 minutes. Moderate exercise is safe for most people, but it is always a good idea to talk to your doctor before starting an exercise program.  · Keep moving. Ai Limb the lawn, work in the garden, or Poolami. Take the stairs instead of the elevator at work. · If you smoke, quit. People who smoke have an increased risk for heart attack, stroke, cancer, and other lung illnesses. Quitting is hard, but there are ways to boost your chance of quitting tobacco for good. ? Use nicotine gum, patches, or lozenges. ? Ask your doctor about stop-smoking programs and medicines. ? Keep trying. In addition to reducing your risk of diseases in the future, you will notice some benefits soon after you stop using tobacco. If you have shortness of breath or asthma symptoms, they will likely get better within a few weeks after you quit. · Limit how much alcohol you drink. Moderate amounts of alcohol (up to 2 drinks a day for men, 1 drink a day for women) are okay. But drinking too much can lead to liver problems, high blood pressure, and other health problems. Family health  If you have a family, there are many things you can do together to improve your health. · Eat meals together as a family as often as possible. · Eat healthy foods. This includes fruits, vegetables, lean meats and dairy, and whole grains. · Include your family in your fitness plan. Most people think of activities such as jogging or tennis as the way to fitness, but there are many ways you and your family can be more active. Anything that makes you breathe hard and gets your heart pumping is exercise. Here are some tips:  ? Walk to do errands or to take your child to school or the bus.  ? Go for a family bike ride after dinner instead of watching TV. Where can you learn more? Go to http://lindsay-kai.info/. Enter X338 in the search box to learn more about \"A Healthy Lifestyle: Care Instructions. \"  Current as of: September 11, 2018  Content Version: 11.9  © 4768-8184 [employer]. Care instructions adapted under license by [employer] (which disclaims liability or warranty for this information). If you have questions about a medical condition or this instruction, always ask your healthcare professional. [address] any warranty or liability for your use of this information.

## 2019-05-16 NOTE — PROGRESS NOTES
Alex Dejesus is a 66 y.o. female   Chief Complaint   Patient presents with    Pre-op Exam    pt here for a pre op clearance for R malleolar open reduction internal fixation. This will be on 5/24/19 at [facility]. Pt did also have a study done in Feb of this year showing low prob of PE. Pt states her CP is on and off, needed a nitro one week ago. She states she will be having general anesthesia. Is planning to hold coumadin as of tomorrow. Pt is aware she will also require cardiac clearance. Pt also with Type 2 diabetes well controlled last A1C was 5.1 in march. Alex Dejesus is a 66 y.o. female is a 66 y.o. yo female who presents for preoperative evaluation. Latex Allergy:NO    History of anesthesia reaction: NO    History of PE/DVT:YES    Allergies   Allergen Reactions    Contrast Dye [Iodine] Anaphylaxis     Tolerates when pre medicated w/ IV solumedrol and benadryl prior to procedure     Levaquin [Levofloxacin] Nausea and Vomiting    Morphine Hives and Itching       Current Outpatient Medications   Medication Sig    mupirocin (BACTROBAN) 2 % ointment Apply  to affected area four (4) times daily.  allopurinol (ZYLOPRIM) 100 mg tablet TAKE 1 TABLET BY MOUTH EVERY DAY    oxyCODONE-acetaminophen (PERCOCET) 7.5-325 mg per tablet Take 1 Tab by mouth two (2) times daily as needed for Pain for up to 30 days. Max Daily Amount: 2 Tabs.  warfarin (COUMADIN) 2 mg tablet Take 1 Tab by mouth daily. Indications: lung embolism    isosorbide mononitrate ER (IMDUR) 120 mg CR tablet TAKE 1 TABLET BY MOUTH EVERY DAY    pantoprazole (PROTONIX) 40 mg tablet TAKE 1 TABLET BY MOUTH TWICE A DAY FOR HEARTBURN    amLODIPine (NORVASC) 10 mg tablet Take 10 mg by mouth daily.  clopidogrel (PLAVIX) 75 mg tab Take 1 Tab by mouth daily.  diclofenac (VOLTAREN) 1 % gel Apply 2 g to affected area four (4) times daily.  As needed for right knee pain    insulin aspart protamine/insulin aspart (Lorrane Claudia MIX 70-30 U-100 INSULN) 100 unit/mL (70-30) injection 10-40 Units by SubCUTAneous route nightly as needed (BG > 160).  nitroglycerin (NITROSTAT) 0.3 mg SL tablet 1 Tab by SubLINGual route every five (5) minutes as needed for Chest Pain.  carvedilol (COREG) 25 mg tablet Take 1.5 Tabs by mouth two (2) times daily (with meals).  albuterol (PROAIR HFA) 90 mcg/actuation inhaler Take 2 Puffs by inhalation every four (4) hours as needed for Wheezing.  ergocalciferol (VITAMIN D2) 50,000 unit capsule Take 50,000 Units by mouth every Tuesday.  atorvastatin (LIPITOR) 80 mg tablet Take 80 mg by mouth nightly. No current facility-administered medications for this visit.         Patient Active Problem List   Diagnosis Code    CAD (coronary Artery Disease) Native Artery I25.10    JASEN on CPAP G47.33, Z99.89    Pulmonary HTN I27.20    HTN (hypertension) I10    Morbid obesity E66.01    Esophageal reflux K21.9    Gastroparesis K31.84    Normocytic anemia D64.9    DM (diabetes mellitus), type 2 with renal complications ([facility]) C00.54    COPD, severe ([facility] [address] J44.9    ILD (interstitial lung disease) ([facility]) J84.9    Warfarin anticoagulation Z79.01    Hx of deep venous thrombosis Z86.718    Sleep apnea G47.30    Diabetic gastroparesis (HCC) E11.43, K31.84    GERD (gastroesophageal reflux disease) K21.9    Rheumatoid arteritis I00    DM type 2 causing neurological disease ([facility] E11.49    DM type 2 causing renal disease ([facility] [address] E11.29    DM type 2 causing vascular disease ([facility] [address] E11.59    Gout M10.9    Limited mobility Z74.09    ESRD on hemodialysis (HCC) N18.6, Z99.2    (HFpEF) heart failure with preserved ejection fraction (HCC) I50.30    Nephrolithiasis N20.0    Symptomatic PVCs I49.3       Past Surgical History:   Procedure Laterality Date    CARDIAC SURG PROCEDURE UNLIST      stents    COLONOSCOPY N/A 6/24/2016    COLONOSCOPY performed by Naa Foley MD at [facility] HX APPENDECTOMY  HX CARPAL TUNNEL RELEASE      bilateral    HX CHOLECYSTECTOMY      HX HERNIA REPAIR      HX HYSTERECTOMY      HX ORTHOPAEDIC  11/12/12    right knee replacement    HX TONSIL AND ADENOIDECTOMY      UPPER GI ENDOSCOPY,VINOD W ALBERTA  6/24/2016            Reviewed PmHx, RxHx, FmHx, SocHx, AllgHx and updated and dated in the chart. Review of Systems - negative except as listed above in the HPI    Objective:     Vitals:    05/16/19 1415   BP: 99/63   Pulse: 72   Resp: 16   Temp: 98 °F (36.7 °C)   TempSrc: Oral   SpO2: 92%   Weight: 303 lb (137.4 kg)   Height: 5' 10\" (1.778 m)     Physical Examination: General appearance - alert, well appearing, and in no distress  Mental status - alert, oriented to person, place, and time  Mouth - mucous membranes moist, pharynx normal without lesions  Neck - supple, no significant adenopathy  Chest - clear to auscultation, no wheezes, rales or rhonchi, symmetric air entry  Heart - normal rate, regular rhythm, normal S1, S2, no murmurs, rubs, clicks or gallops  Abdomen - soft, nontender, nondistended, no masses or organomegaly  Neurological - alert, oriented, normal speech, no focal findings or movement disorder noted  Extremities - 1+ pedal edema on L, R side with boot. Assessment/ Plan:   Diagnoses and all orders for this visit:    1. Pre-op evaluation  -     HGB & HCT    2. Encounter for monitoring Coumadin therapy  -     AMB POC PT/INR    3. Boil  -     mupirocin (BACTROBAN) 2 % ointment; Apply  to affected area four (4) times daily. Pt with ESRD on dialysis w/ chronic anemia lastcheck was 8.8 and pt reports recent 8.5 at dialysis on epogen, CAD with angina, Chronic chest pain 2nd to angina on percocet chronically. Pt is high risk for surgery and esthela lalso require cardiac clearance    I have discussed the diagnosis with the patient and the intended plan as seen in the above orders.   The patient has received an after-visit summary and questions were answered concerning future plans. Pt conveyed understanding of plan.     Medication Side Effects and Warnings were discussed with patient      Paramjit Cain,

## 2019-05-16 NOTE — PROGRESS NOTES
Chief Complaint   Patient presents with    Pre-op Exam     Patient presents in office today for pre-op clearance. Surgery is scheduled for 5/24/19. Also needs to have INR checked. No other concerns. 1. Have you been to the ER, urgent care clinic since your last visit? Hospitalized since your last visit? No    2. Have you seen or consulted any other health care providers outside of the [address] [place] [address] since your last visit? Include any pap smears or colon screening.  No    Learning Assessment 3/20/2018   PRIMARY LEARNER Patient   PRIMARY LANGUAGE ENGLISH   LEARNER PREFERENCE PRIMARY LISTENING   ANSWERED BY patient    RELATIONSHIP SELF

## 2019-05-16 NOTE — TELEPHONE ENCOUNTER
Low cardiac risk for ankle surgery. Defer anesthesia risk to pulmonary given her advanced lung condition. Can stop warfarin 4 days preop (indication hx of DVT), would not stop plavix due to recent PCI.      Matt Davis MD

## 2019-05-16 NOTE — TELEPHONE ENCOUNTER
Yamel Jaquez and Dr Paul Caceres are requesting cardiac stratification for surgery for fractured ankle on 5/24/2019. Recommendations for coumadin hold.     LOV 3/20/19

## 2019-05-16 NOTE — PERIOP NOTES
Patient is coming through [facility] next week for surgery with Dr. Katty Duckworth on . She is currently on Plavix and Coumadin. Email sent to her cardiologist Dr. Consuelo Sanches via [facility] nurse practitioner Carlos to verify when to stop these medications prior to surgery. Patient has history of DVT and a drug eluding stent was placed 2019.

## 2019-05-16 NOTE — PERIOP NOTES
Patient with history of RCA SAL on 2/19/2019. She recently received cardiac clearance for a ureteroscopy procedure with stent placement from Dr. Robyn Mckeon on 3/20/2019. The note includes instructions regarding holding her Warfarin but continuing her Plavix for the procedure. The patient plans to try to obtain cardiac clearance from Dr. Robyn Mckeon in regards to her upcoming malleolar ORIF. Discussed the above with Dr. Cruzito Thompson, anesthesiologist.  Per Dr. Cruzito Thompson, due to the patient's need for surgery, OK to proceed with surgery, but the decision to stop or continue her anticoagulants/ anti-platelets will need to come from both the patient's cardiologist and surgeon. DOS: 5/24/2019

## 2019-05-17 ENCOUNTER — TELEPHONE (OUTPATIENT)
Dept: CARDIOLOGY CLINIC | Age: 64
End: 2019-05-17

## 2019-05-17 LAB
HCT VFR BLD AUTO: 29.4 % (ref 34–46.6)
HGB BLD-MCNC: 9 G/DL (ref 11.1–15.9)

## 2019-05-17 NOTE — TELEPHONE ENCOUNTER
[facility] with dr Azalea Alford office is calling to see if that clearance can be sent to them.  The patient is scheduled next week  Phone: [phone]  Fax: [phone]

## 2019-05-20 ENCOUNTER — TELEPHONE (OUTPATIENT)
Dept: CARDIOLOGY CLINIC | Age: 64
End: 2019-05-20

## 2019-05-20 NOTE — TELEPHONE ENCOUNTER
Nomi Salazar from Dr. Bro Patrick office is calling to ask a question about the clearance letter she received. She states that it is not very legible because the document has a black tint to it. She states she see's something about Pulmonology, so she is calling to ask if Dr. Jose E Boggs thought patient needed to be cleared by a Pulmonologist?      She can be reached at [phone].

## 2019-05-21 ENCOUNTER — HOSPITAL ENCOUNTER (OUTPATIENT)
Dept: PREADMISSION TESTING | Age: 64
Discharge: HOME | DRG: 492 | End: 2019-05-21
Payer: MEDICARE

## 2019-05-21 VITALS
TEMPERATURE: 97.8 F | OXYGEN SATURATION: 96 % | SYSTOLIC BLOOD PRESSURE: 129 MMHG | HEART RATE: 72 BPM | BODY MASS INDEX: 41.95 KG/M2 | RESPIRATION RATE: 22 BRPM | DIASTOLIC BLOOD PRESSURE: 58 MMHG | HEIGHT: 70 IN | WEIGHT: 293 LBS

## 2019-05-21 LAB
ALBUMIN SERPL-MCNC: 2.8 G/DL (ref 3.5–5)
ALBUMIN/GLOB SERPL: 0.7 {RATIO} (ref 1.1–2.2)
ALP SERPL-CCNC: 163 U/L (ref 45–117)
ALT SERPL-CCNC: 8 U/L (ref 12–78)
ANION GAP SERPL CALC-SCNC: 5 MMOL/L (ref 5–15)
AST SERPL-CCNC: 8 U/L (ref 15–37)
BASOPHILS # BLD: 0 K/UL (ref 0–0.1)
BASOPHILS NFR BLD: 1 % (ref 0–1)
BILIRUB SERPL-MCNC: 0.4 MG/DL (ref 0.2–1)
BUN SERPL-MCNC: 30 MG/DL (ref 6–20)
BUN/CREAT SERPL: 5 (ref 12–20)
CALCIUM SERPL-MCNC: 8.3 MG/DL (ref 8.5–10.1)
CHLORIDE SERPL-SCNC: 103 MMOL/L (ref 97–108)
CO2 SERPL-SCNC: 33 MMOL/L (ref 21–32)
CREAT SERPL-MCNC: 6.45 MG/DL (ref 0.55–1.02)
CRP SERPL-MCNC: 1.86 MG/DL (ref 0–0.6)
DIFFERENTIAL METHOD BLD: ABNORMAL
EOSINOPHIL # BLD: 0.3 K/UL (ref 0–0.4)
EOSINOPHIL NFR BLD: 3 % (ref 0–7)
ERYTHROCYTE [DISTWIDTH] IN BLOOD BY AUTOMATED COUNT: 16.4 % (ref 11.5–14.5)
ERYTHROCYTE [SEDIMENTATION RATE] IN BLOOD: 70 MM/HR (ref 0–30)
GLOBULIN SER CALC-MCNC: 3.9 G/DL (ref 2–4)
GLUCOSE SERPL-MCNC: 112 MG/DL (ref 65–100)
HCT VFR BLD AUTO: 33.2 % (ref 35–47)
HGB BLD-MCNC: 9.7 G/DL (ref 11.5–16)
IMM GRANULOCYTES # BLD AUTO: 0 K/UL (ref 0–0.04)
IMM GRANULOCYTES NFR BLD AUTO: 0 % (ref 0–0.5)
INR PPP: 1.7 (ref 0.9–1.1)
LYMPHOCYTES # BLD: 2 K/UL (ref 0.8–3.5)
LYMPHOCYTES NFR BLD: 24 % (ref 12–49)
MCH RBC QN AUTO: 30.2 PG (ref 26–34)
MCHC RBC AUTO-ENTMCNC: 29.2 G/DL (ref 30–36.5)
MCV RBC AUTO: 103.4 FL (ref 80–99)
MONOCYTES # BLD: 0.8 K/UL (ref 0–1)
MONOCYTES NFR BLD: 9 % (ref 5–13)
NEUTS SEG # BLD: 5.4 K/UL (ref 1.8–8)
NEUTS SEG NFR BLD: 63 % (ref 32–75)
NRBC # BLD: 0 K/UL (ref 0–0.01)
NRBC BLD-RTO: 0 PER 100 WBC
PLATELET # BLD AUTO: 259 K/UL (ref 150–400)
PMV BLD AUTO: 9.6 FL (ref 8.9–12.9)
POTASSIUM SERPL-SCNC: 5 MMOL/L (ref 3.5–5.1)
PROT SERPL-MCNC: 6.7 G/DL (ref 6.4–8.2)
PROTHROMBIN TIME: 17.1 SEC (ref 9–11.1)
RBC # BLD AUTO: 3.21 M/UL (ref 3.8–5.2)
SODIUM SERPL-SCNC: 141 MMOL/L (ref 136–145)
WBC # BLD AUTO: 8.6 K/UL (ref 3.6–11)

## 2019-05-21 PROCEDURE — 86140 C-REACTIVE PROTEIN: CPT

## 2019-05-21 PROCEDURE — 85610 PROTHROMBIN TIME: CPT

## 2019-05-21 PROCEDURE — 85652 RBC SED RATE AUTOMATED: CPT

## 2019-05-21 PROCEDURE — 80053 COMPREHEN METABOLIC PANEL: CPT

## 2019-05-21 PROCEDURE — 85025 COMPLETE CBC W/AUTO DIFF WBC: CPT

## 2019-05-21 PROCEDURE — 36415 COLL VENOUS BLD VENIPUNCTURE: CPT

## 2019-05-21 RX ORDER — NITROGLYCERIN 0.4 MG/1
0.4 TABLET SUBLINGUAL
COMMUNITY
End: 2020-01-09 | Stop reason: SDUPTHER

## 2019-05-21 NOTE — PERIOP NOTES
LVM for Dr. Ying Rojas nurse Jessica Orosco)  to verify dialysis on Friday 5[facility]24/19 [facility]. Need to clarify if this will be set-up by their office. DOS: 5/24/19. Talked with Practice manager at Dr. Lizzy Joy office and requested orders for the [facility] bloodwork. Explained it was easier for the blood to be drawn here since we are drawing labs for today's visit. DOS: 5/24/19.

## 2019-05-21 NOTE — PERIOP NOTES
[address] [facility] [address], [address] [facility]                            MAIN OR                                  [phone]   MAIN PRE OP                          [phone]                                                                                AMBULATORY PRE OP          [phone]  PRE-ADMISSION TESTING    [phone]     Surgery Date:   Friday, 5/24/19. Is surgery arrival time given by surgeon? NO  If NO, [facility] staff will call you between 3 and 7pm the day before your surgery with your arrival time. (If your surgery is on a Monday, we will call you the Friday before.)    Call [phone] after 7pm Monday-Friday if you did not receive your arrival time. Verification phone call on Thursday, 5/23/19. INSTRUCTIONS BEFORE YOUR SURGERY   When You  Arrive   Arrive at the [address] [facility] on the day of your surgery  Have your insurance card, photo ID, and any copayment (if needed)     Food   and   Drink   NO food or drink after midnight the night before surgery    This means NO water, gum, mints, coffee, juice, etc.  No alcohol (beer, wine, liquor) 24 hours before and after surgery     Medications to   TAKE   Morning of Surgery   MEDICATIONS TO TAKE THE MORNING OF SURGERY WITH A SIP OF WATER:    Amlodipine, Coreg, Imdur, Protonix, Plavix and Percocet with small sip of water. May TAKE NTG if needed. Medications  To  STOP      7 days before surgery    Non-Steroidal anti-inflammatory Drugs (NSAID's): for example, Ibuprofen (Advil, Motrin), Naproxen (Aleve)   Aspirin, if taking for pain    Herbal supplements, vitamins, and fish oil   Other:  (Pain medications not listed above, including Tylenol may be taken)   Blood  Thinners    If you take  Aspirin, Plavix, Coumadin, or any blood-thinning or anti-blood clot medicine, talk to the doctor who prescribed the medications for pre-operative instructions.      Bathing Clothing  Jewelry  Valuables       If you shower the morning of surgery, please do not apply anything to your skin (lotions, powders, deodorant, or makeup, especially mascara)   Follow all special bath instructions (for total joint replacement, spine and bowel surgeries)   Do not shave or trim anywhere 24 hours before surgery   Wear your hair loose or down; no pony-tails, buns, or metal hair clips   Wear loose, comfortable, clean clothes   Wear glasses instead of contacts  [employer] money, valuables, and jewelry, including body piercings, at home     Going Home       or Spending the Night    SAME-DAY SURGERY: You must have a responsible adult drive you home and stay with you 24 hours after surgery   ADMITS: If your doctor is keeping you into the hospital after surgery, leave personal belongings/luggage in your car until you have a hospital room number. Hospital discharge time is 12 noon  Drivers must be here before 12 noon unless you are told differently   Special Instructions Free  parking with No Tipping. Bring completed medication form on day of surgery. NO INSULIN DAY OF SURGERY. Bring c-pap and Inogen. Bring nitroglycerin. Follow all instructions so your surgery wont be cancelled. Please, be on time. If a situation occurs and you are delayed the day of surgery, call [phone] or          [phone]. If your physical condition changes (like a fever, cold, flu, etc.) call your surgeon. The patient was contacted  in person. Home medication reviewed and verified during PAT appointment. The patient verbalizes understanding of all instructions and does not  need reinforcement.

## 2019-05-22 NOTE — PERIOP NOTES
Labwork faxed to Dr. Oziel La office via EMR, message left with Genie Thapa that results have been sent. Call placed to KINDRA ALVAREZ's office to request pulmonary clearance. Pulmonary clearance received, OV notes state that patient is cleared for future colonoscopy. Call placed to KINDRA ALVAREZ's office to request corrected clearance. Received a call from [facility] at Dr. Faith Landa office, patient is to receive dialysis at [facility] following her surgery and this will be scheduled by Dr. Faith Landa office (date and time of surgery verified so that this can be coordinated). Message left with Genie Thapa to notify Dr. Faith Landa office should timing of surgery change.

## 2019-05-23 ENCOUNTER — ANESTHESIA EVENT (OUTPATIENT)
Dept: SURGERY | Age: 64
DRG: 492 | End: 2019-05-23
Payer: MEDICARE

## 2019-05-23 NOTE — ANESTHESIA PREPROCEDURE EVALUATION
Relevant Problems   No relevant active problems   Anesthetic History               Review of Systems / Medical History  Patient summary reviewed    Pulmonary        Sleep apnea: CPAP        Comments: Hx of smoking  Interstitial Lung Disease  Worsening LARA, SOB last few months   Neuro/Psych              Cardiovascular    Hypertension          Past MI, CAD and cardiac stents    Exercise tolerance: <4 METS  Comments: Cardiac workup 5/2016 - per patient \"everything is ok\".   EF (4/2016) 60% - 65%   GI/Hepatic/Renal     GERD    Renal disease: CRI      Comments: Stage 3 CKD Endo/Other    Diabetes: type 2, using insulin    Morbid obesity and arthritis (rheumatoid)     Other Findings              Physical Exam    Airway  Mallampati: III  TM Distance: 4 - 6 cm  Neck ROM: normal range of motion   Mouth opening: Normal     Cardiovascular    Rhythm: regular  Rate: normal         Dental    Dentition: Edentulous     Pulmonary  Breath sounds clear to auscultation               Abdominal  GI exam deferred       Other Findings            Anesthetic Plan    ASA: 4  Anesthesia type: MAC          Induction: Intravenous  Anesthetic plan and risks discussed with: Patient              Anesthetic History               Review of Systems / Medical History  Patient summary reviewed and pertinent labs reviewed    Pulmonary    COPD (2L oxygen): severe    Sleep apnea: CPAP        Comments: Interstitial lung disease   Neuro/Psych   Within defined limits           Cardiovascular    Hypertension          Past MI (2012), CAD, cardiac stents (x4, last stent 2/19) and hyperlipidemia    Exercise tolerance: <4 METS  Comments: Still on plavix, stopped coumadin   GI/Hepatic/Renal     GERD: well controlled    Renal disease (dialysis Wed): ESRD and dialysis       Endo/Other    Diabetes: type 2, using insulin    Morbid obesity, arthritis (OA and RA), cancer (h/o cervical and uterine) and anemia     Other Findings   Comments: Gout    H/o DVT right calf Physical Exam    Airway  Mallampati: II  TM Distance: 4 - 6 cm  Neck ROM: normal range of motion   Mouth opening: Normal     Cardiovascular    Rhythm: regular  Rate: normal         Dental    Dentition: Edentulous     Pulmonary                 Abdominal         Other Findings            Anesthetic Plan    ASA: 4  Anesthesia type: general          Induction: Intravenous  Anesthetic plan and risks discussed with: Patient      Patient prefers GA without block due to risk of bleeding while on plavix. She would be amenable to a block in PACU if pain is severe after surgery.

## 2019-05-23 NOTE — PERIOP NOTES
The plan was for [facility] to come to perform HD on the patient after surgery, but we do not have a designated dialysis center here at [facility]. A call was placed to Serge at Dr. Anibal Call office inquiring if the patient will be having HD at [facility] or going to the dialysis center after discharge. Per Scarlett Orozco, the patient will be having dialysis at [facility]. Dr. Brit De will be sending orders for the patient to be admitted as a 23 hour observation. Requested that these orders be faxed to the [facility] pre-op. Left a VM donnie Bartholomew at Dr. Claudette Ford office informing her of the plan for the patient to be a 23 hour admission post surgery. Informed Dr. Mariely Edmondson , anesthesiologist, of the plan for the HD to be done after surgery.   DOS: 5/24/2019

## 2019-05-24 ENCOUNTER — HOSPITAL ENCOUNTER (INPATIENT)
Age: 64
LOS: 1 days | Discharge: HOME HEALTH | DRG: 492 | End: 2019-05-28
Attending: ORTHOPAEDIC SURGERY | Admitting: ORTHOPAEDIC SURGERY
Payer: MEDICARE

## 2019-05-24 ENCOUNTER — APPOINTMENT (OUTPATIENT)
Dept: GENERAL RADIOLOGY | Age: 64
DRG: 492 | End: 2019-05-24
Attending: ORTHOPAEDIC SURGERY
Payer: MEDICARE

## 2019-05-24 ENCOUNTER — ANESTHESIA (OUTPATIENT)
Dept: SURGERY | Age: 64
DRG: 492 | End: 2019-05-24
Payer: MEDICARE

## 2019-05-24 DIAGNOSIS — Z98.890 S/P ORIF (OPEN REDUCTION INTERNAL FIXATION) FRACTURE: Primary | ICD-10-CM

## 2019-05-24 DIAGNOSIS — Z87.81 S/P ORIF (OPEN REDUCTION INTERNAL FIXATION) FRACTURE: Primary | ICD-10-CM

## 2019-05-24 LAB
ANION GAP BLD CALC-SCNC: 16 MMOL/L (ref 10–20)
APPEARANCE SNV: ABNORMAL
BUN BLD-MCNC: 30 MG/DL (ref 9–20)
CA-I BLD-MCNC: 1.11 MMOL/L (ref 1.12–1.32)
CHLORIDE BLD-SCNC: 100 MMOL/L (ref 98–107)
CO2 BLD-SCNC: 29 MMOL/L (ref 21–32)
COLOR SNV: ABNORMAL
CREAT BLD-MCNC: 6.1 MG/DL (ref 0.6–1.3)
GLUCOSE BLD STRIP.AUTO-MCNC: 112 MG/DL (ref 65–100)
GLUCOSE BLD STRIP.AUTO-MCNC: 131 MG/DL (ref 65–100)
GLUCOSE BLD STRIP.AUTO-MCNC: 133 MG/DL (ref 65–100)
GLUCOSE BLD STRIP.AUTO-MCNC: 199 MG/DL (ref 65–100)
GLUCOSE BLD-MCNC: 118 MG/DL (ref 65–100)
HCT VFR BLD CALC: 28 % (ref 35–47)
LYMPHOCYTES NFR SNV MANUAL: 4 % (ref 0–15)
MONOCYTES NFR SNV MANUAL: 3 % (ref 0–65)
NEUTROPHILS NFR SNV MANUAL: 93 % (ref 0–20)
NUC CELL # FLD: NORMAL /CU MM
POTASSIUM BLD-SCNC: 4.6 MMOL/L (ref 3.5–5.1)
RBC # SNV: >100 /CU MM
SERVICE CMNT-IMP: ABNORMAL
SODIUM BLD-SCNC: 140 MMOL/L (ref 136–145)
SPECIMEN SOURCE FLD: ABNORMAL
WBC # SNV: ABNORMAL /CU MM (ref 0–150)

## 2019-05-24 PROCEDURE — 77030008684 HC TU ET CUF COVD -B: Performed by: NURSE ANESTHETIST, CERTIFIED REGISTERED

## 2019-05-24 PROCEDURE — 76060000063 HC AMB SURG ANES 1.5 TO 2 HR: Performed by: ORTHOPAEDIC SURGERY

## 2019-05-24 PROCEDURE — 82962 GLUCOSE BLOOD TEST: CPT

## 2019-05-24 PROCEDURE — 99218 HC RM OBSERVATION: CPT

## 2019-05-24 PROCEDURE — 77030020782 HC GWN BAIR PAWS FLX 3M -B

## 2019-05-24 PROCEDURE — 74011000250 HC RX REV CODE- 250: Performed by: ORTHOPAEDIC SURGERY

## 2019-05-24 PROCEDURE — 74011250636 HC RX REV CODE- 250/636: Performed by: ORTHOPAEDIC SURGERY

## 2019-05-24 PROCEDURE — 80047 BASIC METABLC PNL IONIZED CA: CPT

## 2019-05-24 PROCEDURE — 77030039497 HC CST PAD STERILE CHCS -A: Performed by: ORTHOPAEDIC SURGERY

## 2019-05-24 PROCEDURE — 77030028907 HC WRP KNEE WO BGS SOLM -B

## 2019-05-24 PROCEDURE — C1713 ANCHOR/SCREW BN/BN,TIS/BN: HCPCS | Performed by: ORTHOPAEDIC SURGERY

## 2019-05-24 PROCEDURE — 74011000250 HC RX REV CODE- 250: Performed by: ANESTHESIOLOGY

## 2019-05-24 PROCEDURE — 77030020274 HC MISC IMPL ORTHOPEDIC: Performed by: ORTHOPAEDIC SURGERY

## 2019-05-24 PROCEDURE — 74011250637 HC RX REV CODE- 250/637: Performed by: STUDENT IN AN ORGANIZED HEALTH CARE EDUCATION/TRAINING PROGRAM

## 2019-05-24 PROCEDURE — 0MQQ0ZZ REPAIR RIGHT ANKLE BURSA AND LIGAMENT, OPEN APPROACH: ICD-10-PCS | Performed by: ORTHOPAEDIC SURGERY

## 2019-05-24 PROCEDURE — 74011250636 HC RX REV CODE- 250/636: Performed by: ANESTHESIOLOGY

## 2019-05-24 PROCEDURE — 87186 SC STD MICRODIL/AGAR DIL: CPT

## 2019-05-24 PROCEDURE — 77030032490 HC SLV COMPR SCD KNE COVD -B

## 2019-05-24 PROCEDURE — 87077 CULTURE AEROBIC IDENTIFY: CPT

## 2019-05-24 PROCEDURE — 87205 SMEAR GRAM STAIN: CPT

## 2019-05-24 PROCEDURE — 77030031139 HC SUT VCRL2 J&J -A: Performed by: ORTHOPAEDIC SURGERY

## 2019-05-24 PROCEDURE — 74011250636 HC RX REV CODE- 250/636

## 2019-05-24 PROCEDURE — 77030018836 HC SOL IRR NACL ICUM -A: Performed by: ORTHOPAEDIC SURGERY

## 2019-05-24 PROCEDURE — 77030008467 HC STPLR SKN COVD -B: Performed by: ORTHOPAEDIC SURGERY

## 2019-05-24 PROCEDURE — 77030000032 HC CUF TRNQT ZIMM -B: Performed by: ORTHOPAEDIC SURGERY

## 2019-05-24 PROCEDURE — 77030002916 HC SUT ETHLN J&J -A: Performed by: ORTHOPAEDIC SURGERY

## 2019-05-24 PROCEDURE — 89050 BODY FLUID CELL COUNT: CPT

## 2019-05-24 PROCEDURE — 76030000003 HC AMB SURG OR TIME 1.5 TO 2: Performed by: ORTHOPAEDIC SURGERY

## 2019-05-24 PROCEDURE — 77030008367 HC SPLNT ORTHGLS BSNM -A: Performed by: ORTHOPAEDIC SURGERY

## 2019-05-24 PROCEDURE — 76210000032 HC AMBSU PH I REC 3 TO 3.5 HR: Performed by: ORTHOPAEDIC SURGERY

## 2019-05-24 PROCEDURE — 77030028224 HC PDNG CST BSNM -A: Performed by: ORTHOPAEDIC SURGERY

## 2019-05-24 PROCEDURE — 77030011640 HC PAD GRND REM COVD -A: Performed by: ORTHOPAEDIC SURGERY

## 2019-05-24 PROCEDURE — 77030026438 HC STYL ET INTUB CARD -A: Performed by: NURSE ANESTHETIST, CERTIFIED REGISTERED

## 2019-05-24 PROCEDURE — 74011000250 HC RX REV CODE- 250

## 2019-05-24 PROCEDURE — 90935 HEMODIALYSIS ONE EVALUATION: CPT

## 2019-05-24 PROCEDURE — 77030020275 HC MISC ORTHOPEDIC: Performed by: ORTHOPAEDIC SURGERY

## 2019-05-24 PROCEDURE — 0QSJ04Z REPOSITION RIGHT FIBULA WITH INTERNAL FIXATION DEVICE, OPEN APPROACH: ICD-10-PCS | Performed by: ORTHOPAEDIC SURGERY

## 2019-05-24 PROCEDURE — 74011250637 HC RX REV CODE- 250/637: Performed by: ORTHOPAEDIC SURGERY

## 2019-05-24 PROCEDURE — 059C3ZX DRAINAGE OF LEFT BASILIC VEIN, PERCUTANEOUS APPROACH, DIAGNOSTIC: ICD-10-PCS | Performed by: ORTHOPAEDIC SURGERY

## 2019-05-24 PROCEDURE — 87075 CULTR BACTERIA EXCEPT BLOOD: CPT

## 2019-05-24 PROCEDURE — 77030019908 HC STETH ESOPH SIMS -A: Performed by: NURSE ANESTHETIST, CERTIFIED REGISTERED

## 2019-05-24 PROCEDURE — 77030013140 HC MSK NEB VYRM -A

## 2019-05-24 DEVICE — 3.5MM X 12MM LOCKING HEXALOBE SCREW
Type: IMPLANTABLE DEVICE | Site: ANKLE | Status: FUNCTIONAL
Brand: ACUMED

## 2019-05-24 DEVICE — 2.7MM X 18MM LOCKING HEXALOBE SCREW
Type: IMPLANTABLE DEVICE | Site: ANKLE | Status: FUNCTIONAL
Brand: ACUMED

## 2019-05-24 DEVICE — 3.5MM X 14MM LOCKING HEXALOBE SCREW
Type: IMPLANTABLE DEVICE | Site: ANKLE | Status: FUNCTIONAL
Brand: ACUMED

## 2019-05-24 DEVICE — OPTIUM DBM PUTTY
Type: IMPLANTABLE DEVICE | Site: ANKLE | Status: FUNCTIONAL
Brand: OPTIUM®

## 2019-05-24 RX ORDER — ONDANSETRON 2 MG/ML
INJECTION INTRAMUSCULAR; INTRAVENOUS AS NEEDED
Status: DISCONTINUED | OUTPATIENT
Start: 2019-05-24 | End: 2019-05-24 | Stop reason: HOSPADM

## 2019-05-24 RX ORDER — PANTOPRAZOLE SODIUM 40 MG/1
40 TABLET, DELAYED RELEASE ORAL
Status: DISCONTINUED | OUTPATIENT
Start: 2019-05-24 | End: 2019-05-28 | Stop reason: HOSPADM

## 2019-05-24 RX ORDER — ISOSORBIDE MONONITRATE 60 MG/1
120 TABLET, EXTENDED RELEASE ORAL
Status: DISCONTINUED | OUTPATIENT
Start: 2019-05-25 | End: 2019-05-28 | Stop reason: HOSPADM

## 2019-05-24 RX ORDER — SODIUM CHLORIDE 0.9 % (FLUSH) 0.9 %
5-40 SYRINGE (ML) INJECTION AS NEEDED
Status: DISCONTINUED | OUTPATIENT
Start: 2019-05-24 | End: 2019-05-24 | Stop reason: HOSPADM

## 2019-05-24 RX ORDER — BUPIVACAINE HYDROCHLORIDE 5 MG/ML
INJECTION, SOLUTION EPIDURAL; INTRACAUDAL AS NEEDED
Status: DISCONTINUED | OUTPATIENT
Start: 2019-05-24 | End: 2019-05-24 | Stop reason: HOSPADM

## 2019-05-24 RX ORDER — CEFAZOLIN SODIUM/WATER 2 G/20 ML
2 SYRINGE (ML) INTRAVENOUS ONCE
Status: DISCONTINUED | OUTPATIENT
Start: 2019-05-24 | End: 2019-05-24

## 2019-05-24 RX ORDER — SODIUM CHLORIDE 0.9 % (FLUSH) 0.9 %
5-40 SYRINGE (ML) INJECTION AS NEEDED
Status: DISCONTINUED | OUTPATIENT
Start: 2019-05-24 | End: 2019-05-28 | Stop reason: HOSPADM

## 2019-05-24 RX ORDER — NALOXONE HYDROCHLORIDE 0.4 MG/ML
0.04 INJECTION, SOLUTION INTRAMUSCULAR; INTRAVENOUS; SUBCUTANEOUS
Status: DISCONTINUED | OUTPATIENT
Start: 2019-05-24 | End: 2019-05-24 | Stop reason: HOSPADM

## 2019-05-24 RX ORDER — ALLOPURINOL 100 MG/1
100 TABLET ORAL DAILY
Status: DISCONTINUED | OUTPATIENT
Start: 2019-05-25 | End: 2019-05-27

## 2019-05-24 RX ORDER — LIDOCAINE HYDROCHLORIDE 10 MG/ML
0.1 INJECTION, SOLUTION EPIDURAL; INFILTRATION; INTRACAUDAL; PERINEURAL AS NEEDED
Status: DISCONTINUED | OUTPATIENT
Start: 2019-05-24 | End: 2019-05-24 | Stop reason: HOSPADM

## 2019-05-24 RX ORDER — OXYCODONE HYDROCHLORIDE 5 MG/1
10 TABLET ORAL
Status: DISCONTINUED | OUTPATIENT
Start: 2019-05-24 | End: 2019-05-28 | Stop reason: HOSPADM

## 2019-05-24 RX ORDER — FENTANYL CITRATE 50 UG/ML
INJECTION, SOLUTION INTRAMUSCULAR; INTRAVENOUS AS NEEDED
Status: DISCONTINUED | OUTPATIENT
Start: 2019-05-24 | End: 2019-05-24 | Stop reason: HOSPADM

## 2019-05-24 RX ORDER — OXYCODONE HYDROCHLORIDE 5 MG/1
10 TABLET ORAL
Status: COMPLETED | OUTPATIENT
Start: 2019-05-24 | End: 2019-05-24

## 2019-05-24 RX ORDER — INSULIN LISPRO 100 [IU]/ML
INJECTION, SOLUTION INTRAVENOUS; SUBCUTANEOUS
Status: DISCONTINUED | OUTPATIENT
Start: 2019-05-24 | End: 2019-05-28 | Stop reason: HOSPADM

## 2019-05-24 RX ORDER — ATORVASTATIN CALCIUM 20 MG/1
80 TABLET, FILM COATED ORAL
Status: DISCONTINUED | OUTPATIENT
Start: 2019-05-24 | End: 2019-05-28 | Stop reason: HOSPADM

## 2019-05-24 RX ORDER — SODIUM CHLORIDE 9 MG/ML
INJECTION, SOLUTION INTRAVENOUS
Status: DISCONTINUED | OUTPATIENT
Start: 2019-05-24 | End: 2019-05-24 | Stop reason: HOSPADM

## 2019-05-24 RX ORDER — CLOPIDOGREL BISULFATE 75 MG/1
75 TABLET ORAL DAILY
Status: DISCONTINUED | OUTPATIENT
Start: 2019-05-25 | End: 2019-05-28 | Stop reason: HOSPADM

## 2019-05-24 RX ORDER — SODIUM CHLORIDE 0.9 % (FLUSH) 0.9 %
5-40 SYRINGE (ML) INJECTION EVERY 8 HOURS
Status: DISCONTINUED | OUTPATIENT
Start: 2019-05-24 | End: 2019-05-24 | Stop reason: HOSPADM

## 2019-05-24 RX ORDER — FAMOTIDINE 10 MG/ML
INJECTION INTRAVENOUS AS NEEDED
Status: DISCONTINUED | OUTPATIENT
Start: 2019-05-24 | End: 2019-05-24

## 2019-05-24 RX ORDER — MAGNESIUM SULFATE 100 %
4 CRYSTALS MISCELLANEOUS AS NEEDED
Status: DISCONTINUED | OUTPATIENT
Start: 2019-05-24 | End: 2019-05-28 | Stop reason: HOSPADM

## 2019-05-24 RX ORDER — FLUMAZENIL 0.1 MG/ML
0.2 INJECTION INTRAVENOUS
Status: DISCONTINUED | OUTPATIENT
Start: 2019-05-24 | End: 2019-05-24 | Stop reason: HOSPADM

## 2019-05-24 RX ORDER — ALBUTEROL SULFATE 0.83 MG/ML
2.5 SOLUTION RESPIRATORY (INHALATION) ONCE
Status: COMPLETED | OUTPATIENT
Start: 2019-05-24 | End: 2019-05-24

## 2019-05-24 RX ORDER — HYDROMORPHONE HYDROCHLORIDE 1 MG/ML
.25-1 INJECTION, SOLUTION INTRAMUSCULAR; INTRAVENOUS; SUBCUTANEOUS
Status: DISCONTINUED | OUTPATIENT
Start: 2019-05-24 | End: 2019-05-24 | Stop reason: HOSPADM

## 2019-05-24 RX ORDER — DIPHENHYDRAMINE HYDROCHLORIDE 50 MG/ML
12.5 INJECTION, SOLUTION INTRAMUSCULAR; INTRAVENOUS AS NEEDED
Status: DISCONTINUED | OUTPATIENT
Start: 2019-05-24 | End: 2019-05-24 | Stop reason: HOSPADM

## 2019-05-24 RX ORDER — MIDAZOLAM HYDROCHLORIDE 1 MG/ML
INJECTION, SOLUTION INTRAMUSCULAR; INTRAVENOUS AS NEEDED
Status: DISCONTINUED | OUTPATIENT
Start: 2019-05-24 | End: 2019-05-24 | Stop reason: HOSPADM

## 2019-05-24 RX ORDER — SODIUM CHLORIDE, SODIUM LACTATE, POTASSIUM CHLORIDE, CALCIUM CHLORIDE 600; 310; 30; 20 MG/100ML; MG/100ML; MG/100ML; MG/100ML
125 INJECTION, SOLUTION INTRAVENOUS CONTINUOUS
Status: DISCONTINUED | OUTPATIENT
Start: 2019-05-24 | End: 2019-05-24 | Stop reason: HOSPADM

## 2019-05-24 RX ORDER — SUCCINYLCHOLINE CHLORIDE 20 MG/ML
INJECTION INTRAMUSCULAR; INTRAVENOUS AS NEEDED
Status: DISCONTINUED | OUTPATIENT
Start: 2019-05-24 | End: 2019-05-24 | Stop reason: HOSPADM

## 2019-05-24 RX ORDER — NEOSTIGMINE METHYLSULFATE 1 MG/ML
INJECTION INTRAVENOUS AS NEEDED
Status: DISCONTINUED | OUTPATIENT
Start: 2019-05-24 | End: 2019-05-24 | Stop reason: HOSPADM

## 2019-05-24 RX ORDER — PROPOFOL 10 MG/ML
INJECTION, EMULSION INTRAVENOUS AS NEEDED
Status: DISCONTINUED | OUTPATIENT
Start: 2019-05-24 | End: 2019-05-24 | Stop reason: HOSPADM

## 2019-05-24 RX ORDER — AMLODIPINE BESYLATE 5 MG/1
10 TABLET ORAL
Status: DISCONTINUED | OUTPATIENT
Start: 2019-05-25 | End: 2019-05-28 | Stop reason: HOSPADM

## 2019-05-24 RX ORDER — ONDANSETRON 4 MG/1
8 TABLET, ORALLY DISINTEGRATING ORAL
Status: DISCONTINUED | OUTPATIENT
Start: 2019-05-24 | End: 2019-05-28 | Stop reason: HOSPADM

## 2019-05-24 RX ORDER — ROCURONIUM BROMIDE 10 MG/ML
INJECTION, SOLUTION INTRAVENOUS AS NEEDED
Status: DISCONTINUED | OUTPATIENT
Start: 2019-05-24 | End: 2019-05-24 | Stop reason: HOSPADM

## 2019-05-24 RX ORDER — METOCLOPRAMIDE HYDROCHLORIDE 5 MG/ML
INJECTION INTRAMUSCULAR; INTRAVENOUS AS NEEDED
Status: DISCONTINUED | OUTPATIENT
Start: 2019-05-24 | End: 2019-05-24 | Stop reason: HOSPADM

## 2019-05-24 RX ORDER — HYDROMORPHONE HYDROCHLORIDE 1 MG/ML
0.5 INJECTION, SOLUTION INTRAMUSCULAR; INTRAVENOUS; SUBCUTANEOUS
Status: DISCONTINUED | OUTPATIENT
Start: 2019-05-24 | End: 2019-05-28 | Stop reason: HOSPADM

## 2019-05-24 RX ORDER — SODIUM CHLORIDE 0.9 % (FLUSH) 0.9 %
5-40 SYRINGE (ML) INJECTION EVERY 8 HOURS
Status: DISCONTINUED | OUTPATIENT
Start: 2019-05-24 | End: 2019-05-28 | Stop reason: HOSPADM

## 2019-05-24 RX ORDER — FAMOTIDINE 10 MG/ML
INJECTION INTRAVENOUS AS NEEDED
Status: DISCONTINUED | OUTPATIENT
Start: 2019-05-24 | End: 2019-05-24 | Stop reason: HOSPADM

## 2019-05-24 RX ORDER — OXYCODONE HYDROCHLORIDE 5 MG/1
10 TABLET ORAL
Status: DISCONTINUED | OUTPATIENT
Start: 2019-05-24 | End: 2019-05-24

## 2019-05-24 RX ORDER — GLYCOPYRROLATE 0.2 MG/ML
INJECTION INTRAMUSCULAR; INTRAVENOUS AS NEEDED
Status: DISCONTINUED | OUTPATIENT
Start: 2019-05-24 | End: 2019-05-24 | Stop reason: HOSPADM

## 2019-05-24 RX ORDER — LIDOCAINE HYDROCHLORIDE 20 MG/ML
INJECTION, SOLUTION EPIDURAL; INFILTRATION; INTRACAUDAL; PERINEURAL AS NEEDED
Status: DISCONTINUED | OUTPATIENT
Start: 2019-05-24 | End: 2019-05-24 | Stop reason: HOSPADM

## 2019-05-24 RX ORDER — METOCLOPRAMIDE HYDROCHLORIDE 5 MG/ML
INJECTION INTRAMUSCULAR; INTRAVENOUS AS NEEDED
Status: DISCONTINUED | OUTPATIENT
Start: 2019-05-24 | End: 2019-05-24

## 2019-05-24 RX ORDER — SODIUM CHLORIDE, SODIUM LACTATE, POTASSIUM CHLORIDE, CALCIUM CHLORIDE 600; 310; 30; 20 MG/100ML; MG/100ML; MG/100ML; MG/100ML
125 INJECTION, SOLUTION INTRAVENOUS CONTINUOUS
Status: DISCONTINUED | OUTPATIENT
Start: 2019-05-24 | End: 2019-05-24

## 2019-05-24 RX ORDER — CARVEDILOL 12.5 MG/1
37.5 TABLET ORAL 2 TIMES DAILY WITH MEALS
Status: DISCONTINUED | OUTPATIENT
Start: 2019-05-24 | End: 2019-05-28 | Stop reason: HOSPADM

## 2019-05-24 RX ORDER — DEXTROSE 50 % IN WATER (D50W) INTRAVENOUS SYRINGE
12.5-25 AS NEEDED
Status: DISCONTINUED | OUTPATIENT
Start: 2019-05-24 | End: 2019-05-28 | Stop reason: HOSPADM

## 2019-05-24 RX ADMIN — OXYCODONE HYDROCHLORIDE 10 MG: 5 TABLET ORAL at 15:24

## 2019-05-24 RX ADMIN — FAMOTIDINE 20 MG: 10 INJECTION INTRAVENOUS at 07:31

## 2019-05-24 RX ADMIN — MIDAZOLAM HYDROCHLORIDE 1 MG: 1 INJECTION, SOLUTION INTRAMUSCULAR; INTRAVENOUS at 08:28

## 2019-05-24 RX ADMIN — ROCURONIUM BROMIDE 10 MG: 10 INJECTION, SOLUTION INTRAVENOUS at 08:35

## 2019-05-24 RX ADMIN — ONDANSETRON 4 MG: 2 INJECTION INTRAMUSCULAR; INTRAVENOUS at 09:40

## 2019-05-24 RX ADMIN — ATORVASTATIN CALCIUM 80 MG: 20 TABLET, FILM COATED ORAL at 21:42

## 2019-05-24 RX ADMIN — ROCURONIUM BROMIDE 20 MG: 10 INJECTION, SOLUTION INTRAVENOUS at 08:47

## 2019-05-24 RX ADMIN — SUCCINYLCHOLINE CHLORIDE 160 MG: 20 INJECTION INTRAMUSCULAR; INTRAVENOUS at 08:37

## 2019-05-24 RX ADMIN — Medication 10 ML: at 21:45

## 2019-05-24 RX ADMIN — LIDOCAINE HYDROCHLORIDE 100 MG: 20 INJECTION, SOLUTION EPIDURAL; INFILTRATION; INTRACAUDAL; PERINEURAL at 08:35

## 2019-05-24 RX ADMIN — PANTOPRAZOLE SODIUM 40 MG: 40 TABLET, DELAYED RELEASE ORAL at 21:43

## 2019-05-24 RX ADMIN — ONDANSETRON 8 MG: 4 TABLET, ORALLY DISINTEGRATING ORAL at 22:09

## 2019-05-24 RX ADMIN — FENTANYL CITRATE 100 MCG: 50 INJECTION, SOLUTION INTRAMUSCULAR; INTRAVENOUS at 08:35

## 2019-05-24 RX ADMIN — OXYCODONE HYDROCHLORIDE 10 MG: 5 TABLET ORAL at 18:31

## 2019-05-24 RX ADMIN — METOCLOPRAMIDE HYDROCHLORIDE 10 MG: 5 INJECTION INTRAMUSCULAR; INTRAVENOUS at 07:31

## 2019-05-24 RX ADMIN — ALBUTEROL SULFATE 2.5 MG: 2.5 SOLUTION RESPIRATORY (INHALATION) at 07:45

## 2019-05-24 RX ADMIN — CARVEDILOL 37.5 MG: 12.5 TABLET, FILM COATED ORAL at 22:09

## 2019-05-24 RX ADMIN — MIDAZOLAM HYDROCHLORIDE 1 MG: 1 INJECTION, SOLUTION INTRAMUSCULAR; INTRAVENOUS at 08:30

## 2019-05-24 RX ADMIN — GLYCOPYRROLATE 0.4 MG: 0.2 INJECTION INTRAMUSCULAR; INTRAVENOUS at 10:12

## 2019-05-24 RX ADMIN — HYDROMORPHONE HYDROCHLORIDE 0.5 MG: 1 INJECTION, SOLUTION INTRAMUSCULAR; INTRAVENOUS; SUBCUTANEOUS at 10:36

## 2019-05-24 RX ADMIN — PROPOFOL 20 MG: 10 INJECTION, EMULSION INTRAVENOUS at 08:57

## 2019-05-24 RX ADMIN — NEOSTIGMINE METHYLSULFATE 3 MG: 1 INJECTION INTRAVENOUS at 10:12

## 2019-05-24 RX ADMIN — Medication 10 ML: at 14:00

## 2019-05-24 RX ADMIN — PROPOFOL 180 MG: 10 INJECTION, EMULSION INTRAVENOUS at 08:37

## 2019-05-24 RX ADMIN — OXYCODONE HYDROCHLORIDE 10 MG: 5 TABLET ORAL at 21:43

## 2019-05-24 RX ADMIN — FENTANYL CITRATE 50 MCG: 50 INJECTION, SOLUTION INTRAMUSCULAR; INTRAVENOUS at 08:57

## 2019-05-24 RX ADMIN — SODIUM CHLORIDE: 9 INJECTION, SOLUTION INTRAVENOUS at 09:17

## 2019-05-24 RX ADMIN — SODIUM CHLORIDE: 9 INJECTION, SOLUTION INTRAVENOUS at 07:10

## 2019-05-24 RX ADMIN — CEFAZOLIN 3 G: 1 INJECTION, POWDER, FOR SOLUTION INTRAMUSCULAR; INTRAVENOUS; PARENTERAL at 08:28

## 2019-05-24 RX ADMIN — ONDANSETRON 8 MG: 4 TABLET, ORALLY DISINTEGRATING ORAL at 16:15

## 2019-05-24 RX ADMIN — HYDROMORPHONE HYDROCHLORIDE 0.5 MG: 1 INJECTION, SOLUTION INTRAMUSCULAR; INTRAVENOUS; SUBCUTANEOUS at 13:58

## 2019-05-24 NOTE — ROUTINE PROCESS
TRANSFER - OUT REPORT: 
 
Verbal report given to CRISTINA Barrientos(name) on Kaela Bailey  being transferred to 4th floor Ortho(unit) for routine post - op Report consisted of patients Situation, Background, Assessment and  
Recommendations(SBAR). Information from the following report(s) SBAR, Kardex, Procedure Summary, Intake/Output, MAR and Recent Results was reviewed with the receiving nurse. Lines:  
Peripheral IV 05/24/19 Right Antecubital (Active) Site Assessment Clean, dry, & intact 5/24/2019 12:11 PM  
Phlebitis Assessment 0 5/24/2019 12:11 PM  
Infiltration Assessment 0 5/24/2019 12:11 PM  
Dressing Status Clean, dry, & intact 5/24/2019 12:11 PM  
Dressing Type Transparent;Tape 5/24/2019 12:11 PM  
Hub Color/Line Status Pink; Infusing 5/24/2019 12:11 PM  
Alcohol Cap Used Yes 5/24/2019 12:11 PM  
  
 
Opportunity for questions and clarification was provided. Patient transported with: 
 O2 @ 2 liters Registered Nurse

## 2019-05-24 NOTE — BRIEF OP NOTE
BRIEF OPERATIVE NOTE    Date of Procedure: 5/24/2019   Preoperative Diagnosis: RIGHT LATERAL MALLEOLAR FRACTURE  Postoperative Diagnosis: RIGHT LATERAL MALLEOLAR FRACTURE with syndesmosis injury   Procedure(s):  RIGHT MALLEOLAR OPEN REDUCTION INTERNAL FIXATION and fixation of syndesmosis(GENERAL WITH BLOCK)  Surgeon(s) and Role:     Helena Calvo MD - Primary         Surgical Assistant: Kim Mohr    Surgical Staff:  Circ-1: Lulú Pendleton RN  Scrub Tech-1: Lenard TREJO  Surg Asst-1: Apple Hang  Event Time In Time Out   Incision Start 9163    Incision Close       Anesthesia: General   Estimated Blood Loss: none  Specimens: * No specimens in log *   Findings: RIGHT LATERAL MALLEOLAR FRACTURE with syndesmosis injury      Complications: none  Implants:   Implant Name Type Inv.  Item Serial No.  Lot No. LRB No. Used Action   GRAFT BNE SUB PUTTY FD 1ML -- [employer] - [id number]  GRAFT BNE SUB PUTTY FD 1ML -- [employer] [id number] [facility] T[facility]  Right 1 Implanted   syndesmosis tight rope xp implant titaniuma   NA [employer] [id number] Right 1 Implanted   SCR BNE NLCK HEXALOBE 3.5X18MM -- F/ELBOW PLT SYS - SNA Screw SCR BNE NLCK HEXALOBE 3.5X18MM -- F/ELBOW PLT SYS NA [employer]  Right 1 Implanted   6 hole right fibular plate   NA [employer] NA Right 1 Implanted

## 2019-05-24 NOTE — ANESTHESIA POSTPROCEDURE EVALUATION
Procedure(s):  RIGHT MALLEOLAR OPEN REDUCTION INTERNAL FIXATION with right knee aspiration. general    Anesthesia Post Evaluation      Multimodal analgesia: multimodal analgesia used between 6 hours prior to anesthesia start to PACU discharge  Patient location during evaluation: bedside  Patient participation: complete - patient participated  Level of consciousness: awake  Pain management: adequate  Airway patency: patent  Anesthetic complications: no  Cardiovascular status: acceptable  Respiratory status: acceptable  Hydration status: acceptable        Vitals Value Taken Time   /59 5/24/2019 12:15 PM   Temp 37.2 °C (98.9 °F) 5/24/2019 10:55 AM   Pulse 73 5/24/2019 12:26 PM   Resp 16 5/24/2019 12:26 PM   SpO2 97 % 5/24/2019 12:26 PM   Vitals shown include unvalidated device data.

## 2019-05-24 NOTE — PROGRESS NOTES
Report received awaiting pt's arrival     1358  Pt received to the floor, PPP noted bilaterally cast to right leg family at bedside   ASU medicate pt with pain med once to floor. 1524  Pt given roxicodone 10 mg for complaint of pain pt eating [employer] hamburger and fries pt aware she is on a full liquid diet     1536  Contacted Dr Uvaldo Vargas regarding pt's request for food and more pain medicine. Order received for a renal diet no new med orders received     1615  Pt complain of nausea given po zofran     1720  Paged Dr Edenilson Santiago office pt complaining about right ankle pain 10/10 left a voicemail to number [phone]  Awaiting call back     [address] answering service [phone] awaiting call back     [phone] with Dr. Briana Skinner new order received     5993  Pt given now dose of 10 mg of roxicodone PO   Pt currently receiving dialysis     1930  Bedside and Verbal shift change report given to [facility] (oncoming nurse) by Luisito Nuñez  (offgoing nurse). Report included the following information SBAR, Kardex, OR Summary, Procedure Summary, Intake/Output, MAR and Recent Results.

## 2019-05-24 NOTE — ANESTHESIA PROCEDURE NOTES
Peripheral Block Performed by: Muriel Saravia CRNA Authorized by: Marva Holland MD  
 
 
Pre-procedure: Indications: at surgeon's request and primary anesthetic Preanesthetic Checklist: patient identified, risks and benefits discussed, site marked, timeout performed, anesthesia consent given and patient being monitored Block Type:  
Block Type:  Popliteal and saphenous Laterality:  Right Monitoring:  Continuous pulse ox, frequent vital sign checks, heart rate and oxygen Injection Technique:  Single shot Procedures: ultrasound guided and nerve stimulator Patient Position: seated Prep: chlorhexidine Location:  Lower thigh Needle Type:  Stimuplex Needle Gauge:  21 G Needle Localization:  Anatomical landmarks, infiltration, ultrasound guidance and nerve stimulator Assessment: 
Number of attempts:  1 Injection Assessment:  Incremental injection every 5 mL, negative aspiration for blood, no paresthesia and no intravascular symptoms Patient tolerance:  Patient tolerated the procedure well with no immediate complications Saphenous block done under ultrasound guidance with incremental injection of Ropivacaine 0.5% 10 cc using a 21 G Stimuplex needle.

## 2019-05-24 NOTE — CONSULTS
[address] [address]   Office [phone]  Fax [phone]       Initial Consult Note     Name: Ignacio Chisholm MRN: [id number]  Sex: female    YOB: 1955  Age: 66 y.o. PCP: Evans Christensen DO     Date of admission:    5/24/2019  Date of consultation:   5/24/2019  Requesting physician:  Ismael Morales MD  Reason for consultation:   Medical Management    History of present illness  Ignacio Chisholm is a 66 y.o. female who is POD 0 s/p right malleolar open reduction internal fixation . Patient has a medical history remarkable for  T2DM with ESRD on hemodialysis, HFpEF, Gout, GERD, sleep apnea, COPD, Hx of DVT, ILD, HTN, morbid obesity and CAD. Patient states she is having right foot pain. Denies any chest pain or SOB. Home Medications   Prior to Admission medications    Medication Sig Start Date End Date Taking? Authorizing Provider   Oxygen O2 2L NC 24/7   Yes Provider, Historical   isosorbide mononitrate ER (IMDUR) 120 mg CR tablet TAKE 1 TABLET BY MOUTH EVERY DAY  Patient taking differently: Take 120 mg by mouth Daily (before breakfast). TAKE 1 TABLET BY MOUTH EVERY DAY 3/26/19  Yes Geetha Brown NP   insulin aspart protamine/insulin aspart (NOVOLOG MIX 70-30 U-100 INSULN) 100 unit/mL (70-30) injection 10-40 Units by SubCUTAneous route nightly as needed (BG > 160). Sliding Scale   Yes Provider, Historical   albuterol (PROAIR HFA) 90 mcg/actuation inhaler Take 2 Puffs by inhalation every four (4) hours as needed for Wheezing. Yes Provider, Historical   CARVEDILOL PO Take 37.5 mg by mouth two (2) times daily (with meals). Provider, Historical   nitroglycerin (NITROSTAT) 0.4 mg SL tablet 0.4 mg by SubLINGual route every five (5) minutes as needed for Chest Pain. Up to 3 doses. Provider, Historical   mupirocin (BACTROBAN) 2 % ointment Apply  to affected area four (4) times daily.   Patient taking differently: Apply  to affected area four (4) times daily. Boil vaginal area 5/16/19   Hallie Levine DO   allopurinol (ZYLOPRIM) 100 mg tablet TAKE 1 TABLET BY MOUTH EVERY DAY  Patient taking differently: TAKE 1 TABLET BY MOUTH EVERY DAY       Every morning before breakfast 5/13/19   Hallie Levine DO   oxyCODONE-acetaminophen (PERCOCET) 7.5-325 mg per tablet Take 1 Tab by mouth two (2) times daily as needed for Pain for up to 30 days. Max Daily Amount: 2 Tabs. 5/11/19 6/10/19  Hallie Levine DO   pantoprazole (PROTONIX) 40 mg tablet TAKE 1 TABLET BY MOUTH TWICE A DAY FOR HEARTBURN  Patient taking differently: Take 40 mg by mouth ACB/HS. TAKE 1 TABLET BY MOUTH TWICE A DAY FOR HEARTBURN 2/28/19   Hallie Levine DO   amLODIPine (NORVASC) 10 mg tablet Take 10 mg by mouth Daily (before breakfast). Provider, Historical   clopidogrel (PLAVIX) 75 mg tab Take 1 Tab by mouth daily. Patient taking differently: Take 75 mg by mouth Daily (before breakfast). 2/20/19   Demarco Benavidez NP   diclofenac (VOLTAREN) 1 % gel Apply 2 g to affected area four (4) times daily. As needed for right knee pain 2/7/19   Lyndsay SANDOVAL DO   ergocalciferol (VITAMIN D2) 50,000 unit capsule Take 50,000 Units by mouth every Tuesday. Other, MD Abhijit   atorvastatin (LIPITOR) 80 mg tablet Take 80 mg by mouth nightly. Provider, Historical       Allergies   Allergies   Allergen Reactions    Contrast Dye [Iodine] Anaphylaxis     Tolerates when pre medicated w/ IV solumedrol and benadryl prior to procedure     Levaquin [Levofloxacin] Nausea and Vomiting    Morphine Hives and Itching       Past Medical History   Past Medical History:   Diagnosis Date     Sleep Apnea 2/16/2011    Compliant with c-pap.     Aortic aneurysm (HCC)     Abdominal    CAD (coronary Artery Disease) Native Artery 2/16/2011    Chronic kidney disease     Hemodiaylsis  3x/week    CKD (chronic kidney disease) stage 4, GFR 15-29 ml/min ([facility]) 2/10/2017    Coagulation disorder ([address] 06/2018    Anemia  Last blood Transfusion    COPD (chronic obstructive pulmonary disease) ([address]     Diabetic gastroparesis (HCC)     Diastolic heart failure ([address] 10/5/2012    DM type 2 causing neurological disease ([address]     DM type 2 causing renal disease (HCC)     Insulin SS at night only PRN    DM type 2 causing vascular disease ([address]     Esophageal stricture 2012    dialted Dr. Wilfred Smiley [id number] deficiency [facility])     trait    GERD (gastroesophageal reflux disease)     Gout     Hemodialysis access, AV graft ([address] 04/02/2017    Dialysis MWF    [facility] [address]  Hypertension     ILD (interstitial lung disease) ([address]     open lung bx CJW 2010    Morbid obesity ([address]     OA (osteoarthritis)     Ovarian cancer ([address]     cervical and uterine    Rheumatoid arteritis     S/P left pulmonary artery pressure sensor implant placement 8/31/2017    [facility]     Thromboembolus ([address]     Right calf     Tobacco use disorder 3/21/2012    Uterine cervix cancer ([address]     Vitamin D deficiency 8/9/2013       Past Surgical History  Past Surgical History:   Procedure Laterality Date    CARDIAC SURG PROCEDURE UNLIST  02/2019    x4 with last sttent placed 2/2019.     CHEST SURGERY PROCEDURE UNLISTED Right     > 20 years ago  RLL removed     COLONOSCOPY N/A 6/24/2016    COLONOSCOPY performed by Marisol Martino MD at [address] [facility] HX ADENOIDECTOMY      HX APPENDECTOMY      HX CARPAL TUNNEL RELEASE      bilateral    HX CHOLECYSTECTOMY      HX HEART CATHETERIZATION      x 7    HX HERNIA REPAIR      HX HYSTERECTOMY      HX ORTHOPAEDIC Right 11/12/12    right knee replacement    HX ORTHOPAEDIC Bilateral     carpal tunnel repair    HX SALPINGO-OOPHORECTOMY      HX TONSIL AND ADENOIDECTOMY      HX TONSILLECTOMY      HX VASCULAR ACCESS Left     Left lower arm AV fistula    UPPER GI ENDOSCOPY,DILATN W GUIDE  6/24/2016            Family History  Family History   Problem Relation Age of Onset    Heart Disease Mother     Heart Disease Brother        Social History   Living arrangements: patient lives with their spouse. Ambulates: Independently     Alcohol history: No current use;    Smoking history: former smoker, quit 5 years ago    Illicit drug history: no history of illicit drug use    Sexual history: heterosexual    Review of Systems  Review of Systems   Constitutional: Negative for activity change, chills and fever. HENT: Negative for congestion and sore throat. Eyes: Negative for visual disturbance. Respiratory: Negative for cough and shortness of breath. Cardiovascular: Negative for chest pain and palpitations. Gastrointestinal: Positive for constipation. Negative for abdominal pain. Endocrine: Negative for polyphagia and polyuria. Genitourinary: Negative for dysuria. Musculoskeletal: Positive for arthralgias. Neurological: Negative for weakness. Psychiatric/Behavioral: Negative for confusion. Physical Exam  Objective:  Vital signs: (most recent): Blood pressure 127/53, pulse 61, temperature 98.9 °F (37.2 °C), resp. rate 30, height 5' 10\" (1.778 m), weight 306 lb (138.8 kg), SpO2 96 %. No fever.       O2 Flow Rate (L/min): 3 l/min O2 Device: Nasal cannula   Physical Examination: General appearance - alert, well appearing, and in no distress  Mental status - alert, oriented to person, place, and time  Eyes - pupils equal and reactive, extraocular eye movements intact  Chest - clear to auscultation, no wheezes, rales or rhonchi, symmetric air entry  Heart - normal rate, regular rhythm, normal S1, S2, no murmurs, rubs, clicks or gallops  Abdomen - soft, nontender, nondistended, no masses or organomegaly  Neurological - alert, oriented, normal speech, no focal findings or movement disorder noted  Musculoskeletal - Right foot immobilized, deformity or swelling    Laboratory Data  Recent Results (from the past 8 hour(s))   GLUCOSE, POC    Collection Time: 05/24/19  6:16 AM   Result Value Ref Range Glucose (POC) 112 (H) 65 - 100 mg/dL    Performed by [employer]    POC [facility]    Collection Time: 05/24/19  8:18 AM   Result Value Ref Range    Calcium, ionized (POC) 1.11 (L) 1.12 - 1.32 mmol/L    Sodium (POC) 140 136 - 145 mmol/L    Potassium (POC) 4.6 3.5 - 5.1 mmol/L    Chloride (POC) 100 98 - 107 mmol/L    CO2 (POC) 29 21 - 32 mmol/L    Anion gap (POC) 16 10 - 20 mmol/L    Glucose (POC) 118 (H) 65 - 100 mg/dL    BUN (POC) 30 (H) 9 - 20 mg/dL    Creatinine (POC) 6.1 (H) 0.6 - 1.3 mg/dL    GFRAA, POC 8 (L) >60 ml/min/1.73m2    GFRNA, POC 7 (L) >60 ml/min/1.73m2    Hematocrit (POC) 28 (L) 35.0 - 47.0 %    Comment Comment Not Indicated. CELL COUNT, SYNOVIAL    Collection Time: 05/24/19 10:00 AM   Result Value Ref Range    SYNOVIAL FLD SITE RIGHT      SYN FLUID COLOR DARK YELLOW      SYN FLUID APPEARANCE TURBID      SYNOVIAL FLD RBC CT >100 (H) 0 /cu mm    SYNOVIAL FLD WBC ,298 (H) 0 - 150 /cu mm    SYNOVIAL FLD SEGS 93 (H) 0 - 20 %    SYNOVIAL FLD LYMPHS 4 0 - 15 %    SYNOVIAL FLD MONOS 3 0 - 65 %   CELL COUNT, BODY FLUID    Collection Time: 05/24/19 10:00 AM   Result Value Ref Range    FLUID NUCLEATED CELLS PENDING /cu mm   GLUCOSE, POC    Collection Time: 05/24/19 11:33 AM   Result Value Ref Range    Glucose (POC) 133 (H) 65 - 100 mg/dL    Performed by [employer]        Imaging  CXR Results  (Last 48 hours)    None        CT Results  (Last 48 hours)    None               Impression / Recommendations     Edgard Finney is a 66 y.o. female who is POD 0 right malleolar fracture s/p right malleolar open reduction internal fixation  . The Family Medicine Service was consulted for medical management.     Right lateral malleolar fracture s/p right malleolar open reduction internal fixation.  - Pain regimen per Primary team.      ESRD on hemodialysis MWF.  - Nephrology consulted for dialysis    T2DM - home regimen is insulin aspart protamine/insulin aspart sliding scale  -SSI with hypoglycemic protocol  -POC glucose ACHS  -Will adjust as needed    COPD - Stable. On 2L O2 at home.  -O2 as needed to keep O2 88-94%  -Albuterol PRN    Anemia - Hgb pre op 9.7. BL 8.8-9.9  -Daily CBC     Gout  -Continue home Allopurinol 100mg daily     HLD   -Continue home Lipitor 80mg nightly     GERD   -Continue home Protonix 40mg daily    HFpEF  -  carvedilol BID    HTN   - Continue home medications of Norvasc 10 mg tab daily,    Chest pain  - Continue Imdur 120 mg daily    CAD  - Continue plavix 75 mg tab daily  - Warfarin held   - Lipitor   - Coreg      DVT  - Warfarin held        FEN/GI - Per primary team.  Activity - Per primary team  DVT prophylaxis - Per primary team  GI prophylaxis - Protonix  Disposition - Plan to d/c to Per primary team.  Code Status - Full, discussed with patient / caregivers. Thank you very much for allowing us to participate in the care of this pleasant patient. The family medicine service will continue to follow the patient's medical progress along with you. Please do not hesitate to page with any questions or to discuss the case     Patient will be discussed with Dr. Maryellen Mcardle, Attending Physician    Signed by: Jesse Reece MD  Family Medicine Resident        For Billing    No chief complaint on file.       Hospital Problems  Date Reviewed: 5/16/2019          Codes Class Noted POA    S/P ORIF (open reduction internal fixation) fracture ICD-10-CM: Z96.7, Z87.81  ICD-9-CM: V45.89, V15.51  5/24/2019 Unknown

## 2019-05-25 LAB
GLUCOSE BLD STRIP.AUTO-MCNC: 128 MG/DL (ref 65–100)
GLUCOSE BLD STRIP.AUTO-MCNC: 129 MG/DL (ref 65–100)
GLUCOSE BLD STRIP.AUTO-MCNC: 133 MG/DL (ref 65–100)
GLUCOSE BLD STRIP.AUTO-MCNC: 92 MG/DL (ref 65–100)
SERVICE CMNT-IMP: ABNORMAL
SERVICE CMNT-IMP: NORMAL

## 2019-05-25 PROCEDURE — 74011250636 HC RX REV CODE- 250/636: Performed by: ORTHOPAEDIC SURGERY

## 2019-05-25 PROCEDURE — 74011250637 HC RX REV CODE- 250/637: Performed by: STUDENT IN AN ORGANIZED HEALTH CARE EDUCATION/TRAINING PROGRAM

## 2019-05-25 PROCEDURE — 97530 THERAPEUTIC ACTIVITIES: CPT

## 2019-05-25 PROCEDURE — 99218 HC RM OBSERVATION: CPT

## 2019-05-25 PROCEDURE — 74011250637 HC RX REV CODE- 250/637: Performed by: ORTHOPAEDIC SURGERY

## 2019-05-25 PROCEDURE — 97116 GAIT TRAINING THERAPY: CPT

## 2019-05-25 PROCEDURE — 77030027138 HC INCENT SPIROMETER -A

## 2019-05-25 PROCEDURE — 97165 OT EVAL LOW COMPLEX 30 MIN: CPT

## 2019-05-25 PROCEDURE — 82962 GLUCOSE BLOOD TEST: CPT

## 2019-05-25 PROCEDURE — 77010033678 HC OXYGEN DAILY

## 2019-05-25 PROCEDURE — 97162 PT EVAL MOD COMPLEX 30 MIN: CPT

## 2019-05-25 PROCEDURE — 97535 SELF CARE MNGMENT TRAINING: CPT

## 2019-05-25 RX ORDER — BENZONATATE 100 MG/1
100 CAPSULE ORAL
Status: DISCONTINUED | OUTPATIENT
Start: 2019-05-25 | End: 2019-05-28 | Stop reason: HOSPADM

## 2019-05-25 RX ADMIN — PANTOPRAZOLE SODIUM 40 MG: 40 TABLET, DELAYED RELEASE ORAL at 06:39

## 2019-05-25 RX ADMIN — OXYCODONE HYDROCHLORIDE 10 MG: 5 TABLET ORAL at 15:10

## 2019-05-25 RX ADMIN — HYDROMORPHONE HYDROCHLORIDE 0.5 MG: 1 INJECTION, SOLUTION INTRAMUSCULAR; INTRAVENOUS; SUBCUTANEOUS at 23:09

## 2019-05-25 RX ADMIN — ALLOPURINOL 100 MG: 100 TABLET ORAL at 09:30

## 2019-05-25 RX ADMIN — CARVEDILOL 37.5 MG: 12.5 TABLET, FILM COATED ORAL at 16:27

## 2019-05-25 RX ADMIN — Medication 10 ML: at 06:43

## 2019-05-25 RX ADMIN — ONDANSETRON 8 MG: 4 TABLET, ORALLY DISINTEGRATING ORAL at 23:10

## 2019-05-25 RX ADMIN — AMLODIPINE BESYLATE 10 MG: 5 TABLET ORAL at 06:38

## 2019-05-25 RX ADMIN — HYDROMORPHONE HYDROCHLORIDE 0.5 MG: 1 INJECTION, SOLUTION INTRAMUSCULAR; INTRAVENOUS; SUBCUTANEOUS at 11:50

## 2019-05-25 RX ADMIN — Medication 10 ML: at 21:29

## 2019-05-25 RX ADMIN — ISOSORBIDE MONONITRATE 120 MG: 60 TABLET ORAL at 06:39

## 2019-05-25 RX ADMIN — CLOPIDOGREL BISULFATE 75 MG: 75 TABLET ORAL at 09:29

## 2019-05-25 RX ADMIN — CARVEDILOL 37.5 MG: 12.5 TABLET, FILM COATED ORAL at 09:28

## 2019-05-25 RX ADMIN — ATORVASTATIN CALCIUM 80 MG: 20 TABLET, FILM COATED ORAL at 21:28

## 2019-05-25 RX ADMIN — OXYCODONE HYDROCHLORIDE 10 MG: 5 TABLET ORAL at 09:30

## 2019-05-25 RX ADMIN — OXYCODONE HYDROCHLORIDE 10 MG: 5 TABLET ORAL at 06:38

## 2019-05-25 RX ADMIN — HYDROMORPHONE HYDROCHLORIDE 0.5 MG: 1 INJECTION, SOLUTION INTRAMUSCULAR; INTRAVENOUS; SUBCUTANEOUS at 16:28

## 2019-05-25 RX ADMIN — OXYCODONE HYDROCHLORIDE 10 MG: 5 TABLET ORAL at 21:28

## 2019-05-25 RX ADMIN — OXYCODONE HYDROCHLORIDE 10 MG: 5 TABLET ORAL at 01:30

## 2019-05-25 RX ADMIN — Medication 10 ML: at 16:28

## 2019-05-25 RX ADMIN — PANTOPRAZOLE SODIUM 40 MG: 40 TABLET, DELAYED RELEASE ORAL at 21:28

## 2019-05-25 NOTE — PROGRESS NOTES
Problem: Self Care Deficits Care Plan (Adult)  Goal: *Acute Goals and Plan of Care (Insert Text)  Description  Occupational Therapy Goals  Initiated 5/25/2019  1. Patient will perform lower body dressing with minimal assistance/contact guard assist within 7 day(s). 2.  Patient will perform toilet transfers with minimal assistance/contact guard assist within 7 day(s). 3.  Patient will perform all aspects of toileting with minimal assistance/contact guard assist within 7 day(s). 4.  Patient will participate in upper extremity therapeutic exercise/activities with supervision/set-up for 10 minutes within 7 day(s). 5.  Patient will utilize energy conservation techniques during functional activities with verbal cues within 7 day(s). Outcome: Progressing Towards Goal   OCCUPATIONAL THERAPY EVALUATION  Patient: Nalleyl Curry (25 y.o. female)  Date: 5/25/2019  Primary Diagnosis: S/P ORIF (open reduction internal fixation) fracture [Z96.7, Z87.81]  Procedure(s) (LRB):  RIGHT MALLEOLAR OPEN REDUCTION INTERNAL FIXATION with right knee aspiration (Right) 1 Day Post-Op   Precautions:   NWB, Fall(HD)    ASSESSMENT :  Based on the objective data described below, the patient presents with hospital admission secondary to R malleolar fracture. Patient reports fracture x 4 weeks ago, but with poor healing. Patient now 1 day post op following R ORIF. Patient reports 1 level home, 3 steps to enter, and living with spouse. Patient with NWB status following surgery. Patient with additional complication of outpatient dialysis 3x per week  Today patient received in bed, agreeable to activity due to eagerness to return home. She reports 8/10 pain to RLE. Patient moves to EOB with min assist and requests to move RLE on her own. Increased pain with RLE in dependent position at EOB. Patient BP elevated in sitting, but stable. She performs sit to stand with moderate assistance x 2.   Patient maintains NWB to R foot but unable to take a step with L foot. She is able to slide left foot on floor, moving to L side toward HOB. Patient returned to bed due to increased pain, and limited mobility. Though patient is eager to return home, barriers include ability to travel stairs in/out of home (requires dialysis 3x per week), ability to and perform transfers with assist x 1. If barriers overcome, she will require bariatric BSC, RW, and wheelchair for mobility in/outside of home. Patient will benefit from continued OT services to increase safety and independence with ADL tasks. Patient will benefit from skilled intervention to address the above impairments. Patient?s rehabilitation potential is considered to be Fair  Factors which may influence rehabilitation potential include:   ? None noted  ? Mental ability/status  ? Medical condition  ? Home/family situation and support systems  ? Safety awareness  ? Pain tolerance/management  ? Other:      PLAN :  Recommendations and Planned Interventions:  ?               Self Care Training                  ? Therapeutic Activities  ? Functional Mobility Training    ? Cognitive Retraining  ? Therapeutic Exercises           ? Endurance Activities  ? Balance Training                   ? Neuromuscular Re-Education  ? Visual/Perceptual Training     ? Home Safety Training  ? Patient Education                 ? Family Training/Education  ? Other (comment):    Frequency/Duration: Patient will be followed by occupational therapy 5 times a week to address goals.   Discharge Recommendations: SNF vs home with family assist if able to overcome barriers  Further Equipment Recommendations for Discharge: bariatric bedside commode, rolling walker and wheelchair patient is 300 pounds, requires bariatric wheelchair      SUBJECTIVE:   Patient stated ? You think I can go home today? .?    OBJECTIVE DATA SUMMARY:   HISTORY:   Past Medical History:   Diagnosis Date     Sleep Apnea 2/16/2011    Compliant with c-pap. Aortic aneurysm (HCC)     Abdominal    CAD (coronary Artery Disease) Native Artery 2/16/2011    Chronic kidney disease     Hemodiaylsis  3x/week    CKD (chronic kidney disease) stage 4, GFR 15-29 ml/min ([facility]) 2/10/2017    Coagulation disorder ([address] 06/2018    Anemia  Last blood Transfusion    COPD (chronic obstructive pulmonary disease) ([facility])     Diabetic gastroparesis (HCC)     Diastolic heart failure ([address] 10/5/2012    DM type 2 causing neurological disease ([address]     DM type 2 causing renal disease ([facility])     Insulin SS at night only PRN    DM type 2 causing vascular disease ([address]     Esophageal stricture 2012    dialted Dr. Tai Counts    G6PD deficiency [facility])     trait    GERD (gastroesophageal reflux disease)     Gout     Hemodialysis access, AV graft ([address] 04/02/2017    Dialysis MWF    [address] [facility] Hypertension     ILD (interstitial lung disease) ([address]     open lung bx CJW 2010    Morbid obesity (HCC)     OA (osteoarthritis)     Ovarian cancer ([address]     cervical and uterine    Rheumatoid arteritis     S/P left pulmonary artery pressure sensor implant placement 8/31/2017    Cardiomems     Thromboembolus ([address]     Right calf     Tobacco use disorder 3/21/2012    Uterine cervix cancer ([address]     Vitamin D deficiency 8/9/2013     Past Surgical History:   Procedure Laterality Date    CARDIAC SURG PROCEDURE UNLIST  02/2019    x4 with last sttent placed 2/2019.     CHEST SURGERY PROCEDURE UNLISTED Right     > 20 years ago  RLL removed     COLONOSCOPY N/A 6/24/2016    COLONOSCOPY performed by Chemo Hebert MD at [address] [place], [place] [address]way [address]    HX ADENOIDECTOMY      HX APPENDECTOMY      HX CARPAL TUNNEL RELEASE      bilateral    HX CHOLECYSTECTOMY      HX HEART CATHETERIZATION      x 7    HX HERNIA REPAIR      HX HYSTERECTOMY      HX ORTHOPAEDIC Right 11/12/12    right knee replacement    HX ORTHOPAEDIC Bilateral     carpal tunnel repair    HX SALPINGO-OOPHORECTOMY      HX TONSIL AND ADENOIDECTOMY      HX TONSILLECTOMY      HX VASCULAR ACCESS Left     Left lower arm AV fistula    UPPER GI ENDOSCOPY,VINOD PINZON  6/24/2016            Prior Level of Function/Environment/Context: Mod I    Occupations in which the patient is/was successful, what are the barriers preventing that success:   Performance Patterns (routines, roles, habits, and rituals):   Personal Interests and/or values:   Expanded or extensive additional review of patient history:     Home Situation  Home Environment: Private residence  # Steps to Enter: 3  Rails to Enter: No  One/Two Story Residence: One story  Living Alone: No  Support Systems: Spouse/Significant Other/Partner  Patient Expects to be Discharged to[de-identified] Private residence  Current DME Used/Available at Home: Shower chair  Tub or Shower Type: Shower    Hand dominance: Right    EXAMINATION OF PERFORMANCE DEFICITS:  Cognitive/Behavioral Status:  Neurologic State: Alert  Orientation Level: Oriented X4  Cognition: Follows commands  Perception: Appears intact  Perseveration: No perseveration noted  Safety/Judgement: Awareness of environment    Skin: intact as seen    Edema: RLE    Hearing:       Vision/Perceptual:                                     Range of Motion:  AROM: Generally decreased, functional                         Strength:  Strength: Generally decreased, functional                Coordination:  Coordination: Generally decreased, functional  Fine Motor Skills-Upper: Left Intact; Right Intact    Gross Motor Skills-Upper: Left Intact; Right Intact    Tone & Sensation:    Tone: Normal  Sensation: Intact                      Balance:  Sitting: Intact  Standing: Impaired  Standing - Static: Constant support; Fair  Standing - Dynamic : Poor    Functional Mobility and Transfers for ADLs:  Bed Mobility:  Rolling: Stand-by assistance  Supine to Sit: Minimum assistance  Sit to Supine: Contact guard assistance  Scooting: Stand-by assistance    Transfers:  Sit to Stand: Moderate assistance;Assist x2  Stand to Sit: Minimum assistance;Assist x2  Toilet Transfer : Moderate assistance;Assist x2(BS )    ADL Assessment:  Feeding: Independent    Oral Facial Hygiene/Grooming: Independent    Bathing: Minimum assistance(seated )    Upper Body Dressing: Independent    Lower Body Dressing: Maximum assistance(distal LEs and over hips, balance )    Toileting: Moderate assistance                ADL Intervention and task modifications:                                     Cognitive Retraining  Safety/Judgement: Awareness of environment    Therapeutic Exercise:      Functional Measure:  Barthel Index:    Bathin  Bladder: 10  Bowels: 10  Groomin  Dressin  Feeding: 10  Mobility: 0  Stairs: 0  Toilet Use: 0  Transfer (Bed to Chair and Back): 5  Total: 45/100        Percentage of impairment   0%   1-19%   20-39%   40-59%   60-79%   80-99%   100%   Barthel Score 0-100 100 99-80 79-60 59-40 20-39 1-19   0     The Barthel ADL Index: Guidelines  1. The index should be used as a record of what a patient does, not as a record of what a patient could do. 2. The main aim is to establish degree of independence from any help, physical or verbal, however minor and for whatever reason. 3. The need for supervision renders the patient not independent. 4. A patient's performance should be established using the best available evidence. Asking the patient, friends/relatives and nurses are the usual sources, but direct observation and common sense are also important. However direct testing is not needed. 5. Usually the patient's performance over the preceding 24-48 hours is important, but occasionally longer periods will be relevant. 6. Middle categories imply that the patient supplies over 50 per cent of the effort.   7. Use of aids to be independent is allowed. Nonie Freshwater., Barthel, D.W. (7683). Functional evaluation: the Barthel Index. [address] (14)2. YOVANNY Bazan, Marily Lamas., [facility]. Maria R, [address] (1999). Measuring the change indisability after inpatient rehabilitation; comparison of the responsiveness of the Barthel Index and Functional Rush Measure. Journal of Neurology, Neurosurgery, and Psychiatry, 66(4), 462-211. CON Yañez, JENIFFER Smith, & Ashia Veras M.A. (2004.) Assessment of post-stroke quality of life in cost-effectiveness studies: The usefulness of the Barthel Index and the EuroQoL-5D. Quality of Life Research, 15, 789-00         Occupational Therapy Evaluation Charge Determination   History Examination Decision-Making   LOW Complexity : Brief history review  LOW Complexity : 1-3 performance deficits relating to physical, cognitive , or psychosocial skils that result in activity limitations and / or participation restrictions  LOW Complexity : No comorbidities that affect functional and no verbal or physical assistance needed to complete eval tasks       Based on the above components, the patient evaluation is determined to be of the following complexity level: LOW   Pain:  Pain Scale 1: Numeric (0 - 10)  Pain Intensity 1: 8  Pain Location 1: Leg  Pain Orientation 1: Right  Pain Description 1: Throbbing;Stabbing  Pain Intervention(s) 1: Medication (see MAR)  Activity Tolerance:   VSS  Please refer to the flowsheet for vital signs taken during this treatment. After treatment:   ? Patient left in no apparent distress sitting up in chair  ? Patient left in no apparent distress in bed  ? Call bell left within reach  ? Nursing notified  ? Caregiver present  ? Bed alarm activated    COMMUNICATION/EDUCATION:   The patient?s plan of care was discussed with: Physical Therapist and Registered Nurse.  ?  Home safety education was provided and the patient/caregiver indicated understanding. ? Patient/family have participated as able in goal setting and plan of care. ? Patient/family agree to work toward stated goals and plan of care. ? Patient understands intent and goals of therapy, but is neutral about his/her participation. ? Patient is unable to participate in goal setting and plan of care. This patient?s plan of care is appropriate for delegation to [facility].     Thank you for this referral.  Jessica Code, OTR/L  Time Calculation: 34 mins

## 2019-05-25 NOTE — DIALYSIS
[facility]                         [phone]    Vitals Pre Post Assessment Pre Post   /69 140/77 LOC Alert, oriented Alert,oriented   HR 78 72 Lungs clear clear   Temp 98.6 98.5 Cardiac Reg Reg   Resp 16 16 Skin intact intact   Weight 138.8kg 135.5kg Edema None None      Pain denies denies     Orders   Duration: Start: 3434 End: 2020 Total: 3.5hrs   Blood flow: 400 ml/min   Dialysate flow: 600 ml/min   Dialyzer: Revaclear   K Bath: 2   Ca Bath: 2.5   Na / Bicarb: 140/35   Target Fluid Removal: 3500 ml     Access   Type & Location: Left forearm fistula, cannulated with 15 g. 1 \" needles in antegrade fashion x 2. Good flows and acceptable pressures at Qb350. At EOT, all possible blood returned, sites held until hemostasis achieved. Dsg applied. Pt stable. Comments:                    No issues. Labs   Obtained/Reviewed  Critical Results Called Reviewed labs       Meds Given   Name Dose Route   none                 Total Liters Process: 76.8L   Net Fluid Removed: 3200 ml      Comments  Tolerated well. VSS throughout. Report given to floor RN. Pt to be discharged home now that HD complete.   ANDREW

## 2019-05-25 NOTE — PROGRESS NOTES
Bedside and Verbal shift change report given to CRISTINA Donaldson (oncoming nurse) by Loyda Artis RN (offgoing nurse). Report included the following information SBAR, Kardex, OR Summary, Procedure Summary, Intake/Output, MAR and Recent Results.

## 2019-05-25 NOTE — PROGRESS NOTES
Problem: Mobility Impaired (Adult and Pediatric)  Goal: *Acute Goals and Plan of Care (Insert Text)  Description  Physical Therapy Goals  Initiated 5/25/2019  1. Patient will move from supine to sit and sit to supine  in bed with modified independence within 7 day(s). 2.  Patient will transfer from bed to chair and chair to bed with minimal assistance/contact guard assist using the least restrictive device within 7 day(s). 3.  Patient will perform sit to stand with minimal assistance/contact guard assist within 7 day(s). 4.  Patient will ambulate with minimal assistance/contact guard assist for 5 feet with the least restrictive device within 7 day(s). 5.  Patient will ascend/descend 2 stairs with 1 handrail(s) with moderate assistance within 7 day(s). Outcome: Progressing Towards Goal   PHYSICAL THERAPY EVALUATION  Patient: Shirley Garcia (35 y.o. female)  Date: 5/25/2019  Primary Diagnosis: S/P ORIF (open reduction internal fixation) fracture [Z96.7, Z87.81]  Procedure(s) (LRB):  RIGHT MALLEOLAR OPEN REDUCTION INTERNAL FIXATION with right knee aspiration (Right) 1 Day Post-Op   Precautions:   NWB, Fall(HD)    ASSESSMENT :  Based on the objective data described below, the patient presents with post op day #1 from R malleolar ORIF with R knee aspiration. Pmhx of HD 3xwk with fistula in L forearm and COPD with home O2. Pt lives with spouse in 1 level home with 1 step x 2 to enter and then 2 steps to main level. Pt is NWB on RLE. Pt received supine in bed on 2LO2 , RLE elevated and stating pain level of 8/10. Pt still willing to work with PT.  present. Pt requesting to mobilize RLE independently. Supine to sit with CGA. VSS in supine and sitting. Educated with sit to stand with RW and needing Mod Ax2. Pt rotating LLE on flr with RW 2' to [facility] with Mod Ax2. Unable to fully unload LLE and therefore will not be able to manage stairs to enter/leave her home for HD 3xwk.   W/C not an option due to narrow entrance doorway. Pt requesting ambulance style chair lift for purchase? Otherwise will not be able to exit in case of home emergency. SNF for rehab until WBAT may be indicated due to stair obstacle and need for HD. Pt sit to supine with SBA to CGA for repositioning. Pt left in NAD, in need of pain meds. RN notified. Consulting CM for equipment needs. Patient will benefit from skilled intervention to address the above impairments. Patient?s rehabilitation potential is considered to be Good  Factors which may influence rehabilitation potential include:   ? None noted  ? Mental ability/status  ? Medical condition  ? Home/family situation and support systems  ? Safety awareness  ? Pain tolerance/management  ? Other:      PLAN :  Recommendations and Planned Interventions:  ?           Bed Mobility Training             ? Neuromuscular Re-Education  ? Transfer Training                   ? Orthotic/Prosthetic Training  ? Gait Training                         ? Modalities  ? Therapeutic Exercises           ? Edema Management/Control  ? Therapeutic Activities            ? Patient and Family Training/Education  ? Other (comment):    Frequency/Duration: Patient will be followed by physical therapy  5 times a week to address goals. Discharge Recommendations: Home Health with 24 Supervision or SNF for rehab  Further Equipment Recommendations for Discharge: bariatric rolling walker, bariatric w/c and BSC     SUBJECTIVE:   Patient stated ? I am going home. ?    OBJECTIVE DATA SUMMARY:   HISTORY:    Past Medical History:   Diagnosis Date     Sleep Apnea 2/16/2011    Compliant with c-pap.     Aortic aneurysm (HCC)     Abdominal    CAD (coronary Artery Disease) Native Artery 2/16/2011    Chronic kidney disease     Hemodiaylsis  3x/week    CKD (chronic kidney disease) stage 4, GFR 15-29 ml/min ([address] 2/10/2017    Coagulation disorder ([address] 06/2018    Anemia  Last blood Transfusion    COPD (chronic obstructive pulmonary disease) (HCC)     Diabetic gastroparesis (HCC)     Diastolic heart failure ([address] 10/5/2012    DM type 2 causing neurological disease ([address]     DM type 2 causing renal disease (HCC)     Insulin SS at night only PRN    DM type 2 causing vascular disease ([address]     Esophageal stricture 2012    dialted Dr. Sapp Prim    G6PD deficiency [facility])     trait    GERD (gastroesophageal reflux disease)     Gout     Hemodialysis access, AV graft ([address] 04/02/2017    Dialysis MWF    [address] Hypertension     ILD (interstitial lung disease) ([address]     open lung bx CJW 2010    Morbid obesity (HCC)     OA (osteoarthritis)     Ovarian cancer ([address]     cervical and uterine    Rheumatoid arteritis     S/P left pulmonary artery pressure sensor implant placement 8/31/2017    Cardiomems     Thromboembolus ([address]     Right calf     Tobacco use disorder 3/21/2012    Uterine cervix cancer ([address]     Vitamin D deficiency 8/9/2013     Past Surgical History:   Procedure Laterality Date    CARDIAC SURG PROCEDURE UNLIST  02/2019    x4 with last sttent placed 2/2019.     CHEST SURGERY PROCEDURE UNLISTED Right     > 20 years ago  RLL removed     COLONOSCOPY N/A 6/24/2016    COLONOSCOPY performed by Mitzi Burt MD at [address]    HX ADENOIDECTOMY      HX APPENDECTOMY      HX CARPAL TUNNEL RELEASE      bilateral    HX CHOLECYSTECTOMY      HX HEART CATHETERIZATION      x 7    HX HERNIA REPAIR      HX HYSTERECTOMY      HX ORTHOPAEDIC Right 11/12/12    right knee replacement    HX ORTHOPAEDIC Bilateral     carpal tunnel repair    HX SALPINGO-OOPHORECTOMY      HX TONSIL AND ADENOIDECTOMY      HX TONSILLECTOMY      HX VASCULAR ACCESS Left     Left lower arm AV fistula    UPPER GI ENDOSCOPY,DILATN W GUIDE  6/24/2016          Prior Level of Function/Home Situation: lives with ; walked with [employer] and  walking CAM boot  Personal factors and/or comorbidities impacting plan of care: see above and below    Home Situation  Home Environment: Private residence  # Steps to Enter: 3  Rails to Enter: No  One/Two Story Residence: One story  Living Alone: No  Support Systems: Spouse/Significant Other/Partner  Patient Expects to be Discharged to[de-identified] Private residence  Current DME Used/Available at Home: Shower chair  Tub or Shower Type: Shower    EXAMINATION/PRESENTATION/DECISION MAKING:   Critical Behavior:  Neurologic State: Alert  Orientation Level: Oriented X4  Cognition: Follows commands  Safety/Judgement: Awareness of environment  Hearing:     Skin:  IV; dressing R ankle  Edema: RLE  Range Of Motion:  AROM: Generally decreased, functional                       Strength:    Strength: Generally decreased, functional                    Tone & Sensation:   Tone: Normal              Sensation: Intact               Coordination:  Coordination: Generally decreased, functional  Vision:      Functional Mobility:  Bed Mobility:  Rolling: Stand-by assistance  Supine to Sit: Minimum assistance  Sit to Supine: Contact guard assistance  Scooting: Stand-by assistance  Transfers:  Sit to Stand: Moderate assistance;Assist x2  Stand to Sit: Minimum assistance;Assist x2                       Balance:   Sitting: Intact  Standing: Impaired  Standing - Static: Constant support; Fair  Standing - Dynamic : Poor  Ambulation/Gait Training:  Distance (ft): 2 Feet (ft)  Assistive Device: Walker, rolling;Gait belt  Ambulation - Level of Assistance:  Moderate assistance;Assist x2        Gait Abnormalities: Antalgic;Decreased step clearance  Right Side Weight Bearing: Non-weight bearing              Step Length: Left shortened(R NWB)                          Safety/Judgement: Awareness of environment              Functional Measure:  Barthel Index:    Bathin  Bladder: 10  Bowels: 10  Groomin  Dressin  Feeding: 10  Mobility: 0  Stairs: 0  Toilet Use: 0  Transfer (Bed to Chair and Back): 5  Total: 45/100       Percentage of impairment   0%   1-19%   20-39%   40-59%   60-79%   80-99%   100%   Barthel Score 0-100 100 99-80 79-60 59-40 20-39 1-19   0     The Barthel ADL Index: Guidelines  1. The index should be used as a record of what a patient does, not as a record of what a patient could do. 2. The main aim is to establish degree of independence from any help, physical or verbal, however minor and for whatever reason. 3. The need for supervision renders the patient not independent. 4. A patient's performance should be established using the best available evidence. Asking the patient, friends/relatives and nurses are the usual sources, but direct observation and common sense are also important. However direct testing is not needed. 5. Usually the patient's performance over the preceding 24-48 hours is important, but occasionally longer periods will be relevant. 6. Middle categories imply that the patient supplies over 50 per cent of the effort. 7. Use of aids to be independent is allowed. Kandy Segura., Barthel, D.W. (437). Functional evaluation: the Barthel Index. [facility] (14)2. Minnie Ban mary [place], J.J.M.F, Leon Andre, Bassem Bowser., Aleta Ceredo, [address] [place] [address] (1999). Measuring the change indisability after inpatient rehabilitation; comparison of the responsiveness of the Barthel Index and Functional [place] Measure. Journal of Neurology, Neurosurgery, and Psychiatry, 66(4), 202-315. Jagdeep Humphries, N.J.A, JENIFFER Smith, & Sveta Ramirez MBonitaA. (2004.) Assessment of post-stroke quality of life in cost-effectiveness studies: The usefulness of the Barthel Index and the EuroQoL-5D.  Quality of Life Research, 15, 405-95           Physical Therapy Evaluation Charge Determination   History Examination Presentation Decision-Making   MEDIUM  Complexity : 1-2 comorbidities / personal factors will impact the outcome/ POC  MEDIUM Complexity : 3 Standardized tests and measures addressing body structure, function, activity limitation and / or participation in recreation  MEDIUM Complexity : Evolving with changing characteristics  Other outcome measures barthel  MEDIUM      Based on the above components, the patient evaluation is determined to be of the following complexity level: MEDIUM    Pain:  Pain Scale 1: Numeric (0 - 10)  Pain Intensity 1: 8  Pain Location 1: Leg  Pain Orientation 1: Right  Pain Description 1: Throbbing;Stabbing  Pain Intervention(s) 1: Medication (see MAR)  Activity Tolerance:   Fair to poor  Please refer to the flowsheet for vital signs taken during this treatment. After treatment:   ?         Patient left in no apparent distress sitting up in chair  ? Patient left in no apparent distress in bed  ? Call bell left within reach  ? Nursing notified  ? Caregiver present  ? Bed alarm activated    COMMUNICATION/EDUCATION:   The patient?s plan of care was discussed with: Occupational Therapist and Registered Nurse. ?         Fall prevention education was provided and the patient/caregiver indicated understanding. ? Patient/family have participated as able in goal setting and plan of care. ?         Patient/family agree to work toward stated goals and plan of care. ?         Patient understands intent and goals of therapy, but is neutral about his/her participation. ? Patient is unable to participate in goal setting and plan of care.     Thank you for this referral.  Tacos Lewis, PT   Time Calculation: 38 mins

## 2019-05-25 NOTE — PROGRESS NOTES
5/25/2019  9:52 AM    Reason for Admission:   RIGHT LATERAL MALLEOLAR FRACTURE               RRAT Score:   33               Resources/supports as identified by patient/family:                   Top Challenges facing patient (as identified by patient/family and CM): Finances/Medication cost?      Has Medicare and [address]? Family provides all transportation               Support system or lack thereof? Has family support                      Living arrangements? Lives with spouse            Self-care/ADLs/Cognition? Most ADL's           Current Advanced Directive/Advance Care Plan:  Does not have one                           Plan for utilizing home health:    Open to it                       Likelihood of readmission:  High                  Transition of Care Plan:                CM met with pt and confirmed all demographics. Pt lives in a single story home with about 2 steps to enter the home. Pt lives with spouse. Family provides transportation for pt. Pt has Rx coverage and uses [facility] [address]. Pt's PCP Dr. Brody PAGE. Pt reports DME- [facility] [place] . NN Notified. Pt signed Janette Martinet and Obs letter. No further d/c needs noted. Care Management Interventions  PCP Verified by CM: Yes(Dr. Brody PAGE)  Mode of Transport at Discharge:  Other (see comment)(Family will transport)  Transition of Care Consult (CM Consult): Discharge Planning  Current Support Network: Lives with Spouse  Plan discussed with Pt/Family/Caregiver: Yes  Discharge Location  Discharge Placement: Home with family assistance    Nitin Hairston,  Care Management

## 2019-05-25 NOTE — PROGRESS NOTES
Courtesy note    Received HD last night. As I understand it, to be d/c home. If she is here Monday, will arrange HD then.

## 2019-05-25 NOTE — PROGRESS NOTES
[facility] RESIDENCY PROGRAM  PROGRESS NOTE     5/25/2019  PCP: Evans Christensen,      Assessment/Plan:   Ignacio Chisholm is a 66 y.o. female who is POD 1 right malleolar fracture s/p right malleolar open reduction internal fixation  . The Family Medicine Service was consulted for medical management. 24 hour events: s/p dialysis with improvement in BP, Stable for discharge from medical standpoint      Right lateral malleolar fracture s/p right malleolar open reduction internal fixation.  - Pain regimen per Primary team.       ESRD on hemodialysis MWF.  - Nephrology consulted for dialysis     T2DM - home regimen is insulin aspart protamine/insulin aspart sliding scale  -SSI with hypoglycemic protocol  -POC glucose ACHS  -Will adjust as needed     COPD - Stable. On 2L O2 at home.  -O2 as needed to keep O2 88-94%  -Albuterol PRN     Anemia - Hgb pre op 9.7. BL 8.8-9.9  -Daily CBC     Gout  -Continue home Allopurinol 100mg daily     HLD   -Continue home Lipitor 80mg nightly     GERD   -Continue home Protonix 40mg daily     HFpEF  -  carvedilol BID     HTN   - Continue home medications of Norvasc 10 mg tab daily,     Chest pain  - Continue Imdur 120 mg daily     CAD  - Continue plavix 75 mg tab daily  - Warfarin held   - Lipitor   - Coreg       DVT  - Warfarin held    FEN/GI - Per primary team.  Activity - Per primary team  DVT prophylaxis - Per primary team  GI prophylaxis - Protonix  Disposition - Plan to d/c to Per primary team.  Code Status - Full, discussed with patient / caregivers. Stable for discharge medically     Thank you very much for allowing us to participate in the care of this pleasant patient. The family medicine service will continue to follow the patient's medical progress along with you. Please do not hesitate to page with any questions or to discuss the case. Subjective:   Pt was seen and examined at bedside. Afebrile and hemodynamically stable.  Concerns overnight include:Denies chest pain, SOB, nausea, vomiting, abdominal pain, dizziness. Allergies:   Allergies   Allergen Reactions    Contrast Dye [Iodine] Anaphylaxis     Tolerates when pre medicated w/ IV solumedrol and benadryl prior to procedure     Levaquin [Levofloxacin] Nausea and Vomiting    Morphine Hives and Itching       Objective:   Physical examination  Visit Vitals  /69 (BP 1 Location: Right arm, BP Patient Position: At rest)   Pulse 70   Temp 98.3 °F (36.8 °C)   Resp 16   Ht 5' 10\" (1.778 m)   Wt 309 lb 4.9 oz (140.3 kg)   SpO2 96%   BMI 44.38 kg/m²      Temp (24hrs), Av.9 °F (37.2 °C), Min:98.3 °F (36.8 °C), Max:99.7 °F (37.6 °C)     O2 Flow Rate (L/min): 2 l/min   O2 Device: Nasal cannula    Date 19 - 19 - 19 0659   Shift 7678-4198 7873-8820 24 Hour Total 2534-2911 2071-4528 24 Hour Total   INTAKE   I.V.(mL/kg/hr) 650(0.4)  650(0.2)        Volume (0.9% sodium chloride infusion) 650  650      Shift Total(mL/kg) 650(4.7)  650(4.6)      OUTPUT   Blood 15  15        Estimated Blood Loss 15  15      Dialysis  3200 3200        NET Fluid Removed (mL)  3200 3200      Shift Total(mL/kg) 15(0.1) 3200(22.8) 3215(22.9)       -3200 -2565      Weight (kg) 138.8 140.3 140.3 140.3 140.3 140.3         Last 3 shifts:    1901 -  0700  In: 650 [I.V.:650]  Out: 3215     General:   Alert, cooperative, no acute distress   Head:   Atraumatic   Eyes:   Conjunctivae clear   ENT:  Oral mucosa normal   Neck:  Supple, trachea midline, no adenopathy   No JVD   Back:    No CVA tenderness    Chest wall:    No tenderness or deformities    Lungs:   Clear to auscultation bilaterally    Heart:   Regular rhythm, no murmur   Abdomen:    Soft, non-tender   No masses or organomegaly    Extremities:  No edema or DVT signs   Pulses:  Symmetric all extremities   Skin:  Warm and dry    No rashes or lesions   Neurologic:  Oriented   No focal deficits, Normal sensation in toes Data Review:   No results for input(s): WBC, HGB, HCT, PLT, HGBEXT, HCTEXT, PLTEXT, HGBEXT, HCTEXT, PLTEXT in the last 72 hours. No results for input(s): NA, K, CL, CO2, GLU, BUN, CREA, CA, MG, PHOS, ALB, TBIL, SGOT, ALT, INR in the last 72 hours. No lab exists for component: INREXT, INREXT  Medications reviewed  Current Facility-Administered Medications   Medication Dose Route Frequency    benzonatate (TESSALON) capsule 100 mg  100 mg Oral TID PRN    ondansetron (ZOFRAN ODT) tablet 8 mg  8 mg Oral Q6H PRN    sodium chloride (NS) flush 5-40 mL  5-40 mL IntraVENous Q8H    sodium chloride (NS) flush 5-40 mL  5-40 mL IntraVENous PRN    allopurinol (ZYLOPRIM) tablet 100 mg  100 mg Oral DAILY    amLODIPine (NORVASC) tablet 10 mg  10 mg Oral ACB    atorvastatin (LIPITOR) tablet 80 mg  80 mg Oral QHS    carvedilol (COREG) tablet 37.5 mg  37.5 mg Oral BID WITH MEALS    clopidogrel (PLAVIX) tablet 75 mg  75 mg Oral DAILY    isosorbide mononitrate ER (IMDUR) tablet 120 mg  120 mg Oral ACB    pantoprazole (PROTONIX) tablet 40 mg  40 mg Oral ACB/HS    insulin lispro (HUMALOG) injection   SubCUTAneous AC&HS    glucose chewable tablet 16 g  4 Tab Oral PRN    dextrose (D50W) injection syrg 12.5-25 g  12.5-25 g IntraVENous PRN    glucagon (GLUCAGEN) injection 1 mg  1 mg IntraMUSCular PRN    oxyCODONE IR (ROXICODONE) tablet 10 mg  10 mg Oral Q3H PRN    HYDROmorphone (DILAUDID) syringe 0.5 mg  0.5 mg IntraVENous Q1H PRN       Signed:   Óscar Santiago MD   Resident, Family Medicine        Attending note: Attending note to follow. ..

## 2019-05-25 NOTE — PROGRESS NOTES
Ortho    POD#1    Patient here overnoght due to need for dialysis. C/o pain but has h/o chronic narcotic use    Exam-- splin intact. Able to wiggle toes    A/P- s/p ORIF right amaya wallace.  D/c home    Miguelina Chavira MD

## 2019-05-25 NOTE — DISCHARGE SUMMARY
Orthopedic Discharge Summary    Patient ID:  Levi Pittman  [id number]  female  66 y.o.  1955    Admit date: 5/24/2019    Discharge date and time: No discharge date for patient encounter. Admitting Physician: Willie Presley MD     Discharge Physician: Willie Presley MD    Consulting Physician(s): Treatment Team: Attending Provider: Lacie Foster MD; Consulting Provider: Daniel Myers MD; Consulting Provider: Geneva Gold MD; Consulting Provider: Dhiraj Roberts MD; Consulting Provider: Frantz Boswell MD; Utilization Review: Kirill Mirza    Date of Surgery: 5/24/2019     Preoperative Diagnosis:  RIGHT LATERAL MALLEOLAR FRACTURE    Postoperative Diagnosis: RIGHT LATERAL MALLEOLAR FRACTURE    Procedure(s): Procedure(s):  RIGHT MALLEOLAR OPEN REDUCTION INTERNAL FIXATION with right knee aspiration    Surgeon: Surgeon(s) and Role:     Jose Martinez MD - Primary      Anesthesia:  General    Preoperative Medical Clearance:                           HPI:  Pt is a 66 y.o. female who has a history of S/P ORIF (open reduction internal fixation) fracture [Z96.7, Z87.81] admitted for dialysis recommended by nephrologist.  PMH:   Past Medical History:   Diagnosis Date     Sleep Apnea 2/16/2011    Compliant with c-pap.     Aortic aneurysm (HCC)     Abdominal    CAD (coronary Artery Disease) Native Artery 2/16/2011    Chronic kidney disease     Hemodiaylsis  3x/week    CKD (chronic kidney disease) stage 4, GFR 15-29 ml/min ([facility]) 2/10/2017    Coagulation disorder ([address] 06/2018    Anemia  Last blood Transfusion    COPD (chronic obstructive pulmonary disease) (HCC)     Diabetic gastroparesis (HCC)     Diastolic heart failure ([address] 10/5/2012    DM type 2 causing neurological disease ([address]     DM type 2 causing renal disease (HCC)     Insulin SS at night only PRN    DM type 2 causing vascular disease ([address]     Esophageal stricture 2012    dialted Dr. Jermain Carpenter G6PD deficiency [facility]) trait    GERD (gastroesophageal reflux disease)     Gout     Hemodialysis access, AV graft ([facility] 04/02/2017    Dialysis MWF    [facility] [address]  Hypertension     ILD (interstitial lung disease) ([facility]     open lung bx CJW 2010    Morbid obesity ([facility]     OA (osteoarthritis)     Ovarian cancer ([facility]     cervical and uterine    Rheumatoid arteritis     S/P left pulmonary artery pressure sensor implant placement 8/31/2017    [facility]     Thromboembolus ([facility]     Right calf     Tobacco use disorder 3/21/2012    Uterine cervix cancer ([facility]     Vitamin D deficiency 8/9/2013       Medications upon admission :   Prior to Admission Medications   Prescriptions Last Dose Informant Patient Reported? Taking? CARVEDILOL PO 5/24/2019 at 1630 Self Yes No   Sig: Take 37.5 mg by mouth two (2) times daily (with meals). Oxygen 5/24/2019 at Unknown time Self Yes Yes   Sig: O2 2L NC 24/7   albuterol (PROAIR HFA) 90 mcg/actuation inhaler 4/24/2019 at Unknown time Self Yes Yes   Sig: Take 2 Puffs by inhalation every four (4) hours as needed for Wheezing. allopurinol (ZYLOPRIM) 100 mg tablet 5/23/2019 at 1000 Self No No   Sig: TAKE 1 TABLET BY MOUTH EVERY DAY   Patient taking differently: TAKE 1 TABLET BY MOUTH EVERY DAY       Every morning before breakfast   amLODIPine (NORVASC) 10 mg tablet 5/24/2019 at 1630 Self Yes No   Sig: Take 10 mg by mouth Daily (before breakfast). atorvastatin (LIPITOR) 80 mg tablet 5/23/2019 at 1000 Self Yes No   Sig: Take 80 mg by mouth nightly. clopidogrel (PLAVIX) 75 mg tab 5/24/2019 at 1630 Self No No   Sig: Take 1 Tab by mouth daily. Patient taking differently: Take 75 mg by mouth Daily (before breakfast). diclofenac (VOLTAREN) 1 % gel Unknown at Unknown time Self No No   Sig: Apply 2 g to affected area four (4) times daily. As needed for right knee pain   ergocalciferol (VITAMIN D2) 50,000 unit capsule 5/21/2019 Self Yes No   Sig: Take 50,000 Units by mouth every Tuesday. insulin aspart protamine/insulin aspart (NOVOLOG MIX 70-30 U-100 INSULN) 100 unit/mL (70-30) injection 2019 at Unknown time Self Yes Yes   Sig: 10-40 Units by SubCUTAneous route nightly as needed (BG > 160). Sliding Scale   isosorbide mononitrate ER (IMDUR) 120 mg CR tablet 2019 at 1630 Self No Yes   Sig: TAKE 1 TABLET BY MOUTH EVERY DAY   Patient taking differently: Take 120 mg by mouth Daily (before breakfast). TAKE 1 TABLET BY MOUTH EVERY DAY   mupirocin (BACTROBAN) 2 % ointment Unknown at Unknown time Self No No   Sig: Apply  to affected area four (4) times daily. Patient taking differently: Apply  to affected area four (4) times daily. Boil vaginal area   nitroglycerin (NITROSTAT) 0.4 mg SL tablet Unknown at Unknown time Self Yes No   Si.4 mg by SubLINGual route every five (5) minutes as needed for Chest Pain. Up to 3 doses. oxyCODONE-acetaminophen (PERCOCET) 7.5-325 mg per tablet 2019 at 1630 Self No No   Sig: Take 1 Tab by mouth two (2) times daily as needed for Pain for up to 30 days. Max Daily Amount: 2 Tabs. pantoprazole (PROTONIX) 40 mg tablet 2019 at 1630 Self No No   Sig: TAKE 1 TABLET BY MOUTH TWICE A DAY FOR HEARTBURN   Patient taking differently: Take 40 mg by mouth ACB/HS. TAKE 1 TABLET BY MOUTH TWICE A DAY FOR HEARTBURN      Facility-Administered Medications: None        Allergies: Allergies   Allergen Reactions    Contrast Dye [Iodine] Anaphylaxis     Tolerates when pre medicated w/ IV solumedrol and benadryl prior to procedure     Levaquin [Levofloxacin] Nausea and Vomiting    Morphine Hives and Itching        Hospital Course: The patient underwent surgery. Complications:  None; patient tolerated the procedure well. Was taken to the PACU in stable condition and then transferred to the Orthopedics floor.         Post Op complications: none    Hemoglobin at discharge:    Lab Results   Component Value Date/Time    HGB 9.7 (L) 2019 03:57 PM    INR 1.7 (H) 05/21/2019 03:57 PM       Patient started post-op PT per routine. At the time of discharge, patient was cleared by PT. Discharged to: Home    Discharge instructions:  -See Full Summary of discharge instructions attached  -Resume pre hospital diet            -Resume home medications   Current Discharge Medication List      CONTINUE these medications which have NOT CHANGED    Details   Oxygen O2 2L NC 24/7      isosorbide mononitrate ER (IMDUR) 120 mg CR tablet TAKE 1 TABLET BY MOUTH EVERY DAY  Qty: 90 Tab, Refills: 3      insulin aspart protamine/insulin aspart (NOVOLOG MIX 70-30 U-100 INSULN) 100 unit/mL (70-30) injection 10-40 Units by SubCUTAneous route nightly as needed (BG > 160). Sliding Scale      albuterol (PROAIR HFA) 90 mcg/actuation inhaler Take 2 Puffs by inhalation every four (4) hours as needed for Wheezing. CARVEDILOL PO Take 37.5 mg by mouth two (2) times daily (with meals). nitroglycerin (NITROSTAT) 0.4 mg SL tablet 0.4 mg by SubLINGual route every five (5) minutes as needed for Chest Pain. Up to 3 doses. mupirocin (BACTROBAN) 2 % ointment Apply  to affected area four (4) times daily. Qty: 22 g, Refills: 1    Associated Diagnoses: Boil      allopurinol (ZYLOPRIM) 100 mg tablet TAKE 1 TABLET BY MOUTH EVERY DAY  Qty: 30 Tab, Refills: 4    Associated Diagnoses: Acute gout due to renal impairment involving left foot      oxyCODONE-acetaminophen (PERCOCET) 7.5-325 mg per tablet Take 1 Tab by mouth two (2) times daily as needed for Pain for up to 30 days. Max Daily Amount: 2 Tabs. Qty: 60 Tab, Refills: 0    Associated Diagnoses: Chronic pain syndrome; Unstable angina (HCC)      pantoprazole (PROTONIX) 40 mg tablet TAKE 1 TABLET BY MOUTH TWICE A DAY FOR HEARTBURN  Qty: 180 Tab, Refills: 3    Associated Diagnoses: Gastroesophageal reflux disease, esophagitis presence not specified      amLODIPine (NORVASC) 10 mg tablet Take 10 mg by mouth Daily (before breakfast).       clopidogrel (PLAVIX) 75 mg tab Take 1 Tab by mouth daily. Qty: 30 Tab, Refills: 11      diclofenac (VOLTAREN) 1 % gel Apply 2 g to affected area four (4) times daily. As needed for right knee pain  Qty: 100 g, Refills: 5    Associated Diagnoses: Chronic pain of right knee      ergocalciferol (VITAMIN D2) 50,000 unit capsule Take 50,000 Units by mouth every Tuesday. atorvastatin (LIPITOR) 80 mg tablet Take 80 mg by mouth nightly.           per medical continuation form  -d/c home      Signed:  Ahmet Skinner MD  5/25/2019  7:57 AM

## 2019-05-25 NOTE — PROGRESS NOTES
[address] MEDICINE RESIDENCY PROGRAM  PROGRESS NOTE     5/25/2019  PCP: Cynthia Mcleod, DO     Assessment/Plan:   Yue Bolanos is a 66 y.o. female who is POD 1 right malleolar fracture s/p right malleolar open reduction internal fixation  . The Family Medicine Service was consulted for medical management. 24 hour events:   - No acute events overnight     Right lateral malleolar fracture s/p right malleolar open reduction internal fixation.  - Pain regimen per Primary team.   - Ortho and CM coordinating equipment needed at home ( Wheelchair, beside commode, bariatric bed. - Ortho will determine when to restart warfarin       ESRD on hemodialysis MWF.  - Nephrology consulted for dialysis     T2DM - home regimen is insulin aspart protamine/insulin aspart sliding scale  -SSI with hypoglycemic protocol  -POC glucose ACHS  -Will adjust as needed     COPD - Stable. On 2L O2 at home.  -O2 as needed to keep O2 88-94%  -Albuterol PRN     Anemia - Hgb pre op 9.7. BL 8.8-9.9  -Daily CBC     Gout  -Continue home Allopurinol 100mg daily     HLD   -Continue home Lipitor 80mg nightly     GERD   -Continue home Protonix 40mg daily     HFpEF  -  carvedilol BID     HTN   - Continue home medications of Norvasc 10 mg tab daily,     Chest pain  - Continue Imdur 120 mg daily     CAD  - Continue plavix 75 mg tab daily  - Warfarin held   - Lipitor   - Coreg       DVT  - Warfarin held    FEN/GI - Per primary team.  Activity - Per primary team  DVT prophylaxis - Per primary team  GI prophylaxis - Protonix  Disposition - Plan to d/c to Per primary team.  Code Status - Full, discussed with patient / caregivers. Stable for discharge medically     Thank you very much for allowing us to participate in the care of this pleasant patient. The family medicine service will continue to follow the patient's medical progress along with you. Please do not hesitate to page with any questions or to discuss the case.         Subjective: Pt was seen and examined at bedside. Afebrile and hemodynamically stable. Concerns overnight include:Denies chest pain, SOB, nausea, vomiting, abdominal pain, dizziness. Pain is well controlled. Allergies: Allergies   Allergen Reactions    Contrast Dye [Iodine] Anaphylaxis     Tolerates when pre medicated w/ IV solumedrol and benadryl prior to procedure     Levaquin [Levofloxacin] Nausea and Vomiting    Morphine Hives and Itching       Objective:   Physical examination  Visit Vitals  /63 (BP 1 Location: Right arm, BP Patient Position: At rest)   Pulse 65   Temp 98.3 °F (36.8 °C)   Resp 16   Ht 5' 10\" (1.778 m)   Wt 309 lb 4.9 oz (140.3 kg)   SpO2 96%   BMI 44.38 kg/m²      Temp (24hrs), Av.8 °F (37.1 °C), Min:98.2 °F (36.8 °C), Max:99.7 °F (37.6 °C)     O2 Flow Rate (L/min): 2 l/min   O2 Device: Nasal cannula    Date 19 - 19 - 19 0659   Shift 8130-3180 24 Hour Total 3678-5381 4050-7217 24 Hour Total   INTAKE   I.V.(mL/kg/hr)  650        Volume (0.9% sodium chloride infusion)  650      Shift Total(mL/kg)  650(4.6)      OUTPUT   Blood  15        Estimated Blood Loss  15      Dialysis 3200 3200        NET Fluid Removed (mL) 3200 3200      Shift Total(mL/kg) 3200(22.8) 3215(22.9)      NET -3200 -2565      Weight (kg) 140.3 140.3 140.3 140.3 140.3         Last 3 shifts:     07 -  190  In: 650 [I.V.:650]  Out: 3215     General:   Alert, cooperative, no acute distress   Head:   Atraumatic   Eyes:   Conjunctivae clear   ENT:  Oral mucosa normal   Neck:  Supple, trachea midline, no adenopathy   No JVD   Back:    No CVA tenderness    Chest wall:    No tenderness or deformities    Lungs:   Clear to auscultation bilaterally    Heart:   Regular rhythm, no murmur   Abdomen:    Soft, non-tender   No masses or organomegaly    Extremities:  No edema or DVT signs,. Right lower extremity immobilized, bloody drainage on gauze.     Pulses:  Symmetric all extremities   Skin:  Warm and dry    No rashes or lesions   Neurologic:  Oriented   No focal deficits, Normal sensation in toes          Data Review:   No results for input(s): WBC, HGB, HCT, PLT, HGBEXT, HCTEXT, PLTEXT, HGBEXT, HCTEXT, PLTEXT in the last 72 hours. No results for input(s): NA, K, CL, CO2, GLU, BUN, CREA, CA, MG, PHOS, ALB, TBIL, SGOT, ALT, INR in the last 72 hours. No lab exists for component: INREXT, INREXT  Medications reviewed  Current Facility-Administered Medications   Medication Dose Route Frequency    benzonatate (TESSALON) capsule 100 mg  100 mg Oral TID PRN    ondansetron (ZOFRAN ODT) tablet 8 mg  8 mg Oral Q6H PRN    sodium chloride (NS) flush 5-40 mL  5-40 mL IntraVENous Q8H    sodium chloride (NS) flush 5-40 mL  5-40 mL IntraVENous PRN    allopurinol (ZYLOPRIM) tablet 100 mg  100 mg Oral DAILY    amLODIPine (NORVASC) tablet 10 mg  10 mg Oral ACB    atorvastatin (LIPITOR) tablet 80 mg  80 mg Oral QHS    carvedilol (COREG) tablet 37.5 mg  37.5 mg Oral BID WITH MEALS    clopidogrel (PLAVIX) tablet 75 mg  75 mg Oral DAILY    isosorbide mononitrate ER (IMDUR) tablet 120 mg  120 mg Oral ACB    pantoprazole (PROTONIX) tablet 40 mg  40 mg Oral ACB/HS    insulin lispro (HUMALOG) injection   SubCUTAneous AC&HS    glucose chewable tablet 16 g  4 Tab Oral PRN    dextrose (D50W) injection syrg 12.5-25 g  12.5-25 g IntraVENous PRN    glucagon (GLUCAGEN) injection 1 mg  1 mg IntraMUSCular PRN    oxyCODONE IR (ROXICODONE) tablet 10 mg  10 mg Oral Q3H PRN    HYDROmorphone (DILAUDID) syringe 0.5 mg  0.5 mg IntraVENous Q1H PRN       Signed: Osman Hinojosa MD   Resident, Family Medicine        Attending note: Attending note to follow. ..

## 2019-05-26 LAB
ANION GAP SERPL CALC-SCNC: 5 MMOL/L (ref 5–15)
BUN SERPL-MCNC: 30 MG/DL (ref 6–20)
BUN/CREAT SERPL: 5 (ref 12–20)
CALCIUM SERPL-MCNC: 8.5 MG/DL (ref 8.5–10.1)
CHLORIDE SERPL-SCNC: 102 MMOL/L (ref 97–108)
CO2 SERPL-SCNC: 31 MMOL/L (ref 21–32)
CREAT SERPL-MCNC: 6.37 MG/DL (ref 0.55–1.02)
ERYTHROCYTE [DISTWIDTH] IN BLOOD BY AUTOMATED COUNT: 15.9 % (ref 11.5–14.5)
GLUCOSE BLD STRIP.AUTO-MCNC: 108 MG/DL (ref 65–100)
GLUCOSE BLD STRIP.AUTO-MCNC: 111 MG/DL (ref 65–100)
GLUCOSE BLD STRIP.AUTO-MCNC: 115 MG/DL (ref 65–100)
GLUCOSE BLD STRIP.AUTO-MCNC: 119 MG/DL (ref 65–100)
GLUCOSE SERPL-MCNC: 103 MG/DL (ref 65–100)
HCT VFR BLD AUTO: 29.5 % (ref 35–47)
HGB BLD-MCNC: 8.6 G/DL (ref 11.5–16)
INR PPP: 1.2 (ref 0.9–1.1)
MCH RBC QN AUTO: 30 PG (ref 26–34)
MCHC RBC AUTO-ENTMCNC: 29.2 G/DL (ref 30–36.5)
MCV RBC AUTO: 102.8 FL (ref 80–99)
NRBC # BLD: 0 K/UL (ref 0–0.01)
NRBC BLD-RTO: 0 PER 100 WBC
PLATELET # BLD AUTO: 253 K/UL (ref 150–400)
PMV BLD AUTO: 10 FL (ref 8.9–12.9)
POTASSIUM SERPL-SCNC: 4.3 MMOL/L (ref 3.5–5.1)
PROTHROMBIN TIME: 12.3 SEC (ref 9–11.1)
RBC # BLD AUTO: 2.87 M/UL (ref 3.8–5.2)
SERVICE CMNT-IMP: ABNORMAL
SODIUM SERPL-SCNC: 138 MMOL/L (ref 136–145)
WBC # BLD AUTO: 9.1 K/UL (ref 3.6–11)

## 2019-05-26 PROCEDURE — 74011250637 HC RX REV CODE- 250/637: Performed by: STUDENT IN AN ORGANIZED HEALTH CARE EDUCATION/TRAINING PROGRAM

## 2019-05-26 PROCEDURE — 94760 N-INVAS EAR/PLS OXIMETRY 1: CPT

## 2019-05-26 PROCEDURE — 74011250636 HC RX REV CODE- 250/636: Performed by: ORTHOPAEDIC SURGERY

## 2019-05-26 PROCEDURE — 77010033678 HC OXYGEN DAILY

## 2019-05-26 PROCEDURE — 99218 HC RM OBSERVATION: CPT

## 2019-05-26 PROCEDURE — 97530 THERAPEUTIC ACTIVITIES: CPT

## 2019-05-26 PROCEDURE — 82962 GLUCOSE BLOOD TEST: CPT

## 2019-05-26 PROCEDURE — 80048 BASIC METABOLIC PNL TOTAL CA: CPT

## 2019-05-26 PROCEDURE — 74011250637 HC RX REV CODE- 250/637: Performed by: ORTHOPAEDIC SURGERY

## 2019-05-26 PROCEDURE — 85027 COMPLETE CBC AUTOMATED: CPT

## 2019-05-26 PROCEDURE — 85610 PROTHROMBIN TIME: CPT

## 2019-05-26 PROCEDURE — 36415 COLL VENOUS BLD VENIPUNCTURE: CPT

## 2019-05-26 RX ORDER — WARFARIN 2 MG/1
2 TABLET ORAL DAILY
COMMUNITY
End: 2019-06-13

## 2019-05-26 RX ORDER — WARFARIN 2 MG/1
2 TABLET ORAL EVERY EVENING
Status: COMPLETED | OUTPATIENT
Start: 2019-05-26 | End: 2019-05-26

## 2019-05-26 RX ADMIN — OXYCODONE HYDROCHLORIDE 10 MG: 5 TABLET ORAL at 20:58

## 2019-05-26 RX ADMIN — Medication 10 ML: at 06:36

## 2019-05-26 RX ADMIN — ALLOPURINOL 100 MG: 100 TABLET ORAL at 09:23

## 2019-05-26 RX ADMIN — CLOPIDOGREL BISULFATE 75 MG: 75 TABLET ORAL at 09:23

## 2019-05-26 RX ADMIN — HYDROMORPHONE HYDROCHLORIDE 0.5 MG: 1 INJECTION, SOLUTION INTRAMUSCULAR; INTRAVENOUS; SUBCUTANEOUS at 21:03

## 2019-05-26 RX ADMIN — CARVEDILOL 37.5 MG: 12.5 TABLET, FILM COATED ORAL at 11:37

## 2019-05-26 RX ADMIN — AMLODIPINE BESYLATE 10 MG: 5 TABLET ORAL at 06:36

## 2019-05-26 RX ADMIN — PANTOPRAZOLE SODIUM 40 MG: 40 TABLET, DELAYED RELEASE ORAL at 06:36

## 2019-05-26 RX ADMIN — Medication 10 ML: at 21:07

## 2019-05-26 RX ADMIN — OXYCODONE HYDROCHLORIDE 10 MG: 5 TABLET ORAL at 17:38

## 2019-05-26 RX ADMIN — HYDROMORPHONE HYDROCHLORIDE 0.5 MG: 1 INJECTION, SOLUTION INTRAMUSCULAR; INTRAVENOUS; SUBCUTANEOUS at 03:43

## 2019-05-26 RX ADMIN — CARVEDILOL 37.5 MG: 12.5 TABLET, FILM COATED ORAL at 16:06

## 2019-05-26 RX ADMIN — OXYCODONE HYDROCHLORIDE 10 MG: 5 TABLET ORAL at 13:20

## 2019-05-26 RX ADMIN — HYDROMORPHONE HYDROCHLORIDE 0.5 MG: 1 INJECTION, SOLUTION INTRAMUSCULAR; INTRAVENOUS; SUBCUTANEOUS at 16:06

## 2019-05-26 RX ADMIN — ISOSORBIDE MONONITRATE 120 MG: 60 TABLET ORAL at 06:36

## 2019-05-26 RX ADMIN — HYDROMORPHONE HYDROCHLORIDE 0.5 MG: 1 INJECTION, SOLUTION INTRAMUSCULAR; INTRAVENOUS; SUBCUTANEOUS at 11:37

## 2019-05-26 RX ADMIN — ATORVASTATIN CALCIUM 80 MG: 20 TABLET, FILM COATED ORAL at 21:07

## 2019-05-26 RX ADMIN — OXYCODONE HYDROCHLORIDE 10 MG: 5 TABLET ORAL at 09:23

## 2019-05-26 RX ADMIN — ONDANSETRON 8 MG: 4 TABLET, ORALLY DISINTEGRATING ORAL at 08:04

## 2019-05-26 RX ADMIN — PANTOPRAZOLE SODIUM 40 MG: 40 TABLET, DELAYED RELEASE ORAL at 21:06

## 2019-05-26 RX ADMIN — WARFARIN SODIUM 2 MG: 2 TABLET ORAL at 17:38

## 2019-05-26 NOTE — PROGRESS NOTES
[facility] Dosing Services: 05/26/19  Consult for Warfarin Dosing by Pharmacy by Dr. Zoila Donis provided for this 66 y.o.  female , for indication of Hx of DVT  Day of Therapy: resumed from home. (PTA: Warfarin 2mg po daily)  Dose to achieve an INR goal of 2-3    Order entered for  Warfarin  2 (mg) ordered to be given today at 18:00. Significant drug interactions: Plavix  Previous dose given Held since 5/17 per patient   PT/INR Lab Results   Component Value Date/Time    INR 1.2 (H) 05/26/2019 03:53 AM        Platelets Lab Results   Component Value Date/Time    PLATELET 452 85/11/0372 03:53 AM        H/H Lab Results   Component Value Date/Time    HGB 8.6 (L) 05/26/2019 03:53 AM          Pharmacy to follow daily and will provide subsequent Warfarin dosing based on clinical status.   Page [place])  Contact information [phone]

## 2019-05-26 NOTE — PROGRESS NOTES
5/26/2019    1:06 PM  CM received update on pt's acceptance for [place] with [facility]. Provided pt with update. 11:23 AM  CM consult noted for [place] PT/OT/RN and DME. Provided pt with list for [place]. Pt's first choice is [facility]. [place] of choice signed by pt. Referral sent via [facility]. DME ordered for pt: Bariatic WC, BSC, and Bariatric Walker. Provided pt with update that [facility] will only cover WC or Walker but not both. Pt would like CM to order WC through [facility]. Referral sent and verified with [place] the earliest they can deliver would be Tuesday 5/28. Pt stated that her son will be building a ramp for her to get into the home and stated that a bariatric WC will not fit through her door. Order will need to be updated for a regular WC. PT has been updated and they will assess again. Also discussed options for SNF for pt, but pt is adamant about going home with [place]. CM provided pt with resource information about obtaining a walker and BSC through a pharmacy. CM will continue to f/u for further d/c needs.     Katty Galindo,  Care management

## 2019-05-26 NOTE — PROGRESS NOTES
Ortho    POD#2    No compaints    Patient is here awaiting home DME    Stable s/o ORIF right ankle fx    Samuel Larson MD

## 2019-05-26 NOTE — PROGRESS NOTES
PHYSICAL THERAPY TREATMENT  Patient: Shine Honeycutt (27 y.o. female)  Date: 5/26/2019  Diagnosis: S/P ORIF (open reduction internal fixation) fracture [Z96.7, Z87.81] S/P ORIF (open reduction internal fixation) fracture  Procedure(s) (LRB):  RIGHT MALLEOLAR OPEN REDUCTION INTERNAL FIXATION with right knee aspiration (Right) 2 Days Post-Op  Precautions: NWB, Fall(HD)  Chart, physical therapy assessment, plan of care and goals were reviewed. ASSESSMENT:  Pt presents supine on 2L O2, pain level 8/10, agreeable to treatment with reports of receiving pain medication. RN providing additional pain medication during treatment per pt request. Pt with good effort and demonstrating 4 transfers with seated rest between trials. Pt transferred to 18\" w/c to assess for discharge needs as bariatric walker will not fit into entry of the home. Following bed>w/c pt transferred to [facility] and then to a 20\" w/c with adequate fit. Existing  W/C order was modified to a 20\" with telescoping leg rests and drop down arm rests. Pt transferred w/c>bed and positioned to comfort. Pt required MAX-MOD A X 2  with 3rd person to stabilize RW as pt unable to push with BUE's. Recommend next session to focus on using BUE's on supporting surface arm rests to progress to 1 person transfer necessary for safe return home. Pt's  present and pt education completed although he did not participate with transfers this date. Pt is very motivated and anticipate slow but steady progress. Progression toward goals:  ?    Improving appropriately and progressing toward goals  ? Improving slowly and progressing toward goals  ? Not making progress toward goals and plan of care will be adjusted     PLAN:  Patient continues to benefit from skilled intervention to address the above impairments. Continue treatment per established plan of care.   Discharge Recommendations:  Home Health  Further Equipment Recommendations for Discharge:  bedside commode, rolling walker and wheelchair 20\"      SUBJECTIVE:   Patient stated ? I'll need more practice before I go home. ?    OBJECTIVE DATA SUMMARY:   Critical Behavior:  Neurologic State: Alert, Appropriate for age  Orientation Level: Appropriate for age, Oriented X4  Cognition: Appropriate decision making, Appropriate for age attention/concentration, Appropriate safety awareness, Follows commands  Safety/Judgement: Awareness of environment  Functional Mobility Training:  Bed Mobility:     Supine to Sit: Stand-by assistance  Sit to Supine: Stand-by assistance           Transfers:  Sit to Stand: Maximum assistance;Assist x2(3rd person to stabilize the walker)  Stand to Sit: Moderate assistance;Assist x2        Bed to Chair: Moderate assistance;Assist x2(x 2 reps;  w/c <>BSC 1 rep)                    Balance:  Sitting: Intact  Standing: Intact;Pull to stand; With support  Standing - Static: Good  Standing - Dynamic : Fair  Ambulation/Gait Training:                    Right Side Weight Bearing: Non-weight bearing                                   Pain:  Pain Scale 1: Numeric (0 - 10)  Pain Intensity 1: 7  Pain Location 1: Ankle  Pain Orientation 1: Right     Pain Intervention(s) 1: Medication (see MAR)  Activity Tolerance:   Fair. After treatment:   ?    Patient left in no apparent distress sitting up in chair  ? Patient left in no apparent distress in bed  ? Call bell left within reach  ? Nursing notified  ? Caregiver present  ?     Bed alarm activated    COMMUNICATION/COLLABORATION:   The patient?s plan of care was discussed with: Registered Nurse, , Certified Nursing Assistant/Patient Care Technician and Nurse Practitioner     Dennis Melendez PTA   Time Calculation: 55 mins

## 2019-05-26 NOTE — PROGRESS NOTES
Call placed to Elizabeth De La Rosa NP in regards to the critcal lab results of positive knee fluid results from aspiration of the right knee on 5/24/2019 of gram positive cocci growing in the thio. Results called by [facility] at 1153 hours.

## 2019-05-27 LAB
ANION GAP SERPL CALC-SCNC: 9 MMOL/L (ref 5–15)
BUN SERPL-MCNC: 37 MG/DL (ref 6–20)
BUN/CREAT SERPL: 5 (ref 12–20)
CALCIUM SERPL-MCNC: 8.4 MG/DL (ref 8.5–10.1)
CHLORIDE SERPL-SCNC: 101 MMOL/L (ref 97–108)
CO2 SERPL-SCNC: 27 MMOL/L (ref 21–32)
COMMENT, HOLDF: NORMAL
CREAT SERPL-MCNC: 7.4 MG/DL (ref 0.55–1.02)
GLUCOSE BLD STRIP.AUTO-MCNC: 108 MG/DL (ref 65–100)
GLUCOSE BLD STRIP.AUTO-MCNC: 118 MG/DL (ref 65–100)
GLUCOSE BLD STRIP.AUTO-MCNC: 125 MG/DL (ref 65–100)
GLUCOSE SERPL-MCNC: 104 MG/DL (ref 65–100)
HBV SURFACE AG SER QL: <0.1 INDEX
HBV SURFACE AG SER QL: NEGATIVE
INR PPP: 1.2 (ref 0.9–1.1)
POTASSIUM SERPL-SCNC: 4.5 MMOL/L (ref 3.5–5.1)
PROTHROMBIN TIME: 11.8 SEC (ref 9–11.1)
SAMPLES BEING HELD,HOLD: NORMAL
SERVICE CMNT-IMP: ABNORMAL
SODIUM SERPL-SCNC: 137 MMOL/L (ref 136–145)

## 2019-05-27 PROCEDURE — 5A1D70Z PERFORMANCE OF URINARY FILTRATION, INTERMITTENT, LESS THAN 6 HOURS PER DAY: ICD-10-PCS | Performed by: PSYCHIATRY & NEUROLOGY

## 2019-05-27 PROCEDURE — 80048 BASIC METABOLIC PNL TOTAL CA: CPT

## 2019-05-27 PROCEDURE — 94760 N-INVAS EAR/PLS OXIMETRY 1: CPT

## 2019-05-27 PROCEDURE — 74011250637 HC RX REV CODE- 250/637: Performed by: STUDENT IN AN ORGANIZED HEALTH CARE EDUCATION/TRAINING PROGRAM

## 2019-05-27 PROCEDURE — 74011250637 HC RX REV CODE- 250/637: Performed by: ORTHOPAEDIC SURGERY

## 2019-05-27 PROCEDURE — 99218 HC RM OBSERVATION: CPT

## 2019-05-27 PROCEDURE — 82962 GLUCOSE BLOOD TEST: CPT

## 2019-05-27 PROCEDURE — 74011250636 HC RX REV CODE- 250/636: Performed by: INTERNAL MEDICINE

## 2019-05-27 PROCEDURE — 74011250636 HC RX REV CODE- 250/636: Performed by: ORTHOPAEDIC SURGERY

## 2019-05-27 PROCEDURE — 87340 HEPATITIS B SURFACE AG IA: CPT

## 2019-05-27 PROCEDURE — 90935 HEMODIALYSIS ONE EVALUATION: CPT

## 2019-05-27 PROCEDURE — 36415 COLL VENOUS BLD VENIPUNCTURE: CPT

## 2019-05-27 PROCEDURE — 77010033678 HC OXYGEN DAILY

## 2019-05-27 PROCEDURE — 74011250637 HC RX REV CODE- 250/637

## 2019-05-27 PROCEDURE — 85610 PROTHROMBIN TIME: CPT

## 2019-05-27 RX ORDER — ALLOPURINOL 100 MG/1
100 TABLET ORAL
Status: DISCONTINUED | OUTPATIENT
Start: 2019-05-29 | End: 2019-05-28 | Stop reason: HOSPADM

## 2019-05-27 RX ORDER — NITROGLYCERIN 0.4 MG/1
TABLET SUBLINGUAL
Status: COMPLETED
Start: 2019-05-27 | End: 2019-05-27

## 2019-05-27 RX ORDER — POLYETHYLENE GLYCOL 3350 17 G/17G
17 POWDER, FOR SOLUTION ORAL DAILY
Status: DISCONTINUED | OUTPATIENT
Start: 2019-05-27 | End: 2019-05-28 | Stop reason: HOSPADM

## 2019-05-27 RX ORDER — WARFARIN 2 MG/1
2 TABLET ORAL EVERY EVENING
Status: COMPLETED | OUTPATIENT
Start: 2019-05-27 | End: 2019-05-27

## 2019-05-27 RX ORDER — NITROGLYCERIN 0.4 MG/1
0.4 TABLET SUBLINGUAL
Status: DISCONTINUED | OUTPATIENT
Start: 2019-05-27 | End: 2019-05-28 | Stop reason: HOSPADM

## 2019-05-27 RX ADMIN — ALLOPURINOL 100 MG: 100 TABLET ORAL at 09:38

## 2019-05-27 RX ADMIN — HYDROMORPHONE HYDROCHLORIDE 0.5 MG: 1 INJECTION, SOLUTION INTRAMUSCULAR; INTRAVENOUS; SUBCUTANEOUS at 20:50

## 2019-05-27 RX ADMIN — CARVEDILOL 37.5 MG: 12.5 TABLET, FILM COATED ORAL at 16:26

## 2019-05-27 RX ADMIN — AMLODIPINE BESYLATE 10 MG: 5 TABLET ORAL at 06:58

## 2019-05-27 RX ADMIN — HYDROMORPHONE HYDROCHLORIDE 0.5 MG: 1 INJECTION, SOLUTION INTRAMUSCULAR; INTRAVENOUS; SUBCUTANEOUS at 12:32

## 2019-05-27 RX ADMIN — ONDANSETRON 8 MG: 4 TABLET, ORALLY DISINTEGRATING ORAL at 09:38

## 2019-05-27 RX ADMIN — ISOSORBIDE MONONITRATE 120 MG: 60 TABLET ORAL at 06:58

## 2019-05-27 RX ADMIN — Medication 10 ML: at 20:51

## 2019-05-27 RX ADMIN — NITROGLYCERIN: 0.4 TABLET, ORALLY DISINTEGRATING SUBLINGUAL at 04:25

## 2019-05-27 RX ADMIN — PANTOPRAZOLE SODIUM 40 MG: 40 TABLET, DELAYED RELEASE ORAL at 06:57

## 2019-05-27 RX ADMIN — CLOPIDOGREL BISULFATE 75 MG: 75 TABLET ORAL at 09:38

## 2019-05-27 RX ADMIN — HYDROMORPHONE HYDROCHLORIDE 0.5 MG: 1 INJECTION, SOLUTION INTRAMUSCULAR; INTRAVENOUS; SUBCUTANEOUS at 07:22

## 2019-05-27 RX ADMIN — HYDROMORPHONE HYDROCHLORIDE 0.5 MG: 1 INJECTION, SOLUTION INTRAMUSCULAR; INTRAVENOUS; SUBCUTANEOUS at 16:24

## 2019-05-27 RX ADMIN — ATORVASTATIN CALCIUM 80 MG: 20 TABLET, FILM COATED ORAL at 20:50

## 2019-05-27 RX ADMIN — CARVEDILOL 37.5 MG: 12.5 TABLET, FILM COATED ORAL at 09:44

## 2019-05-27 RX ADMIN — ONDANSETRON 8 MG: 4 TABLET, ORALLY DISINTEGRATING ORAL at 03:29

## 2019-05-27 RX ADMIN — EPOETIN ALFA-EPBX 10000 UNITS: 10000 INJECTION, SOLUTION INTRAVENOUS; SUBCUTANEOUS at 15:50

## 2019-05-27 RX ADMIN — PANTOPRAZOLE SODIUM 40 MG: 40 TABLET, DELAYED RELEASE ORAL at 20:50

## 2019-05-27 RX ADMIN — Medication 10 ML: at 07:02

## 2019-05-27 RX ADMIN — WARFARIN SODIUM 2 MG: 2 TABLET ORAL at 17:04

## 2019-05-27 RX ADMIN — Medication 10 ML: at 16:25

## 2019-05-27 RX ADMIN — Medication 10 ML: at 12:32

## 2019-05-27 RX ADMIN — HYDROMORPHONE HYDROCHLORIDE 0.5 MG: 1 INJECTION, SOLUTION INTRAMUSCULAR; INTRAVENOUS; SUBCUTANEOUS at 09:38

## 2019-05-27 RX ADMIN — OXYCODONE HYDROCHLORIDE 10 MG: 5 TABLET ORAL at 09:38

## 2019-05-27 RX ADMIN — HYDROMORPHONE HYDROCHLORIDE 0.5 MG: 1 INJECTION, SOLUTION INTRAMUSCULAR; INTRAVENOUS; SUBCUTANEOUS at 03:28

## 2019-05-27 NOTE — PROGRESS NOTES
[address] [facility] Bishop Sands  YOB: 1955          Assessment & Plan:   ESRD on HD MWF  S/p ORIF R malleolar fx  Knee aspirate +CNS  HTN  Anemia    REC:  HD today and MWF  ALDO with HD  Let me know if we can help arrange antibiotics p d/c       Subjective:   CC: f/u ESRD  HPI: For HD today. Knee aspirate +CNS   ROS: no n/v/sob  Current Facility-Administered Medications   Medication Dose Route Frequency    nitroglycerin (NITROSTAT) tablet 0.4 mg  0.4 mg SubLINGual Q5MIN PRN    polyethylene glycol (MIRALAX) packet 17 g  17 g Oral DAILY    Warfarin pharmacy to dose   Other Rx Dosing/Monitoring    benzonatate (TESSALON) capsule 100 mg  100 mg Oral TID PRN    ondansetron (ZOFRAN ODT) tablet 8 mg  8 mg Oral Q6H PRN    sodium chloride (NS) flush 5-40 mL  5-40 mL IntraVENous Q8H    sodium chloride (NS) flush 5-40 mL  5-40 mL IntraVENous PRN    allopurinol (ZYLOPRIM) tablet 100 mg  100 mg Oral DAILY    amLODIPine (NORVASC) tablet 10 mg  10 mg Oral ACB    atorvastatin (LIPITOR) tablet 80 mg  80 mg Oral QHS    carvedilol (COREG) tablet 37.5 mg  37.5 mg Oral BID WITH MEALS    clopidogrel (PLAVIX) tablet 75 mg  75 mg Oral DAILY    isosorbide mononitrate ER (IMDUR) tablet 120 mg  120 mg Oral ACB    pantoprazole (PROTONIX) tablet 40 mg  40 mg Oral ACB/HS    insulin lispro (HUMALOG) injection   SubCUTAneous AC&HS    glucose chewable tablet 16 g  4 Tab Oral PRN    dextrose (D50W) injection syrg 12.5-25 g  12.5-25 g IntraVENous PRN    glucagon (GLUCAGEN) injection 1 mg  1 mg IntraMUSCular PRN    oxyCODONE IR (ROXICODONE) tablet 10 mg  10 mg Oral Q3H PRN    HYDROmorphone (DILAUDID) syringe 0.5 mg  0.5 mg IntraVENous Q1H PRN          Objective:     Vitals:  Blood pressure 123/65, pulse 70, temperature 98 °F (36.7 °C), resp. rate 18, height 5' 10\" (1.778 m), weight 140.3 kg (309 lb 4.9 oz), SpO2 97 %.   Temp (24hrs), Av.1 °F (36.7 °C), Min:97.7 °F (36.5 °C), Max:98.5 °F (36.9 °C)      Intake and Output:  No intake/output data recorded. No intake/output data recorded. Physical Exam:               GENERAL ASSESSMENT: NAD  CHEST: CTA  HEART: S1S2  ABDOMEN: Soft,NT  EXTREMITY: no EDEMA      ECG/rhythm:    Data Review      No results for input(s): TNIPOC in the last 72 hours. No lab exists for component: ITNL   No results for input(s): CPK, CKMB, TROIQ in the last 72 hours. Recent Labs     05/27/19  0456 05/26/19  0353    138   K 4.5 4.3    102   CO2 27 31   BUN 37* 30*   CREA 7.40* 6.37*   * 103*   CA 8.4* 8.5   WBC  --  9.1   HGB  --  8.6*   HCT  --  29.5*   PLT  --  253      Recent Labs     05/26/19  0353   INR 1.2*   PTP 12.3*     Needs: urine analysis, urine sodium, protein and creatinine  Lab Results   Component Value Date/Time    Sodium,urine random 25 11/10/2014 12:40 PM    Creatinine, urine 145.29 03/04/2017 05:01 AM           : Geo Guallpa MD  5/27/2019        [facility]:  [email or web address]  Tran Garcia office:  [address] [place] [address], [address] [place] [address],[address]  [place], [address] [facility]  Phone: [phone]  Fax :     [phone]    [address] [facility] office:  [address] [facility]  [address] [facility], [address]   Phone - [phone]  Fax - [phone]

## 2019-05-27 NOTE — OP NOTES
Ezio Craig [facility] 81  OPERATIVE REPORT    Name:  Kathy Connell  MR#:  [id number]  :  1955  ACCOUNT #:  [de-identified]  DATE OF SERVICE:  2019      PREOPERATIVE DIAGNOSES:  1. Right ankle lateral malleolus fracture. 2.  Right knee pain. POSTOPERATIVE DIAGNOSES:  1. Right ankle lateral malleolus fracture. 2.  Right knee pain. 3.  Right ankle syndesmosis injury. PROCEDURE PERFORMED:  1. Open reduction-internal fixation of right lateral malleolus fracture. 2.  Open fixation of right syndesmosis injury to ankle. 3.  Right knee aspiration. SURGEON:  James Hernández MD    ASSISTANT:  Darlene Hartley    ANESTHESIA:  General plus regional block. COMPLICATIONS:  None. SPECIMENS REMOVED:  Aerobic and anaerobic cultures and cell count from right knee. IMPLANTS:  Acumed for right ankle fixation and Arthrex for syndesmosis fixation. ESTIMATED BLOOD LOSS:  None. DESCRIPTION:  The patient was brought to the operating room, given general anesthesia in the supine position. Soft roll and tourniquet were applied to the proximal thigh, prepped and draped in a sterile fashion with ChloraPrep. Extremity was exsanguinated. Tourniquet was inflated. C-arm was used to identify the fracture site at the fibula. Longitudinal incision was made over the fibula and carried down through the subcutaneous tissue sharply. The fibular fracture was identified. The soft tissue from the fracture site was removed. The curette was used to remove any calcifications at the fracture site. It was irrigated. Allograft bone putty was inserted and a clamp was applied. An anterior to posterior Acumed screw was placed in a lag fashion. An Acumed distal fibular plate was applied, secured with distal locking screws and a proximal combination of locking and nonlocking screws.   Fluoroscopic images showed good position of the hardware but there was still widening of the medial mortise space and it was felt that a syndesmosis fixation was indicated. One of the screws from the plate was removed and the anterior to posterior screw was also removed as they were interfering with positioning of the syndesmosis fixation. The Arthrex Syndesmosis TightRope was used. A guide pin and a drill were inserted across all four cortices. The button was inserted under fluoroscopic visualization and toggled. The ankle was placed in neutral alignment and the Syndesmosis TightRope was tightened which improved the medial joint space although it was still probably at least 1-mm wider than normal.  The wound was irrigated and closed with 2-0 Vicryl and skin staples. Next, an 18-gauge needle was inserted into the knee joint and 30 mL of cloudy fluid was aspirated and sent to the lab. Sterile dressings were applied followed by a posterior splint for the ankle. Tourniquet was deflated. The patient was awakened and returned to recovery in stable condition. Her family was notified of her condition.       Oleg Estrada MD      [id number]  D:  05/27/2019 8:40  T:  05/27/2019 8:43  JOB #:  [id number]

## 2019-05-27 NOTE — PROGRESS NOTES
PHYSICAL THERAPY NOTE:    Treatment attempted with pt declining due to not feeling well. Requesting therapist to return later today. Will attempt treatment this afternoon. Thank you.    [facility]

## 2019-05-27 NOTE — PROGRESS NOTES
0434 Patient complaints of chest pain 6/10, non radiating sharp pain, patient calls pain \"angina pain\". Patient refused EKG. Patient states that she gets this pain at home and takes Nitrolglycerine, confirmed Nitro on PTA med list. MD notified, and an order for Nitro is noted. 3054 Patient received 1 tab of Nitro, and pain relieved at 0/10, will continue to monitor. 0500 Patient denies any chest pain. 0430 Patient states that she will not allow anybody to stick her for blood work, patient prefers that nurse pull blood from IV line. Nurse was able to get CBC and BMP, unable to pull enough blood for PT + INR.    0715 Nurse attempted x1 to draw blood but failed, no blood return. Patient refused another stick. Blood work not done.

## 2019-05-27 NOTE — PROGRESS NOTES
[facility] Dosing Services: 05/27/19  Allopurinol dose change per renal P&T protocol  Physician: Dr Laure Stewart  ESRD on HD Mon/Wed/Fri    Indication: home med-gout  Previous Regimen Allopurinol 100 mg po dialy   Serum Creatinine Lab Results   Component Value Date/Time    Creatinine 7.40 (H) 05/27/2019 04:56 AM    Creatinine (POC) 6.1 (H) 05/24/2019 08:18 AM        Creatinine Clearance On HD   BUN Lab Results   Component Value Date/Time    BUN 37 (H) 05/27/2019 04:56 AM    BUN (POC) 30 (H) 05/24/2019 08:18 AM           Plan: Changed Allopurinol to 100 mg po Mon/Wed/Fri    Thank you.   Chloe Boo, PharmD  [phone]

## 2019-05-27 NOTE — DIALYSIS
[facility] Dialysis Team [facility]  [phone]                             Vitals   Pre   Post   Assessment   Pre   Post     Temp  Temp: 97.7 °F (36.5 °C) (05/27/19 1114)    LOC   A/Ox3  A/Ox3   HR   Pulse (Heart Rate): 63 (05/27/19 1252)  73 Lungs    Clear  Clear   B/P  124/62  122/57 Cardiac    Regular  Regular   Resp   Resp Rate: 16 (05/27/19 1252)  16 Skin    Warm/dry Warm/dry   Pain level  Pain Intensity 1: 8 (05/27/19 0721)  0/10 Edema   General    General   Orders:      Duration:   Start:    9537 End:    0247 Total:    3.5     Dialyzer:   Dialyzer/Set Up Inspection: Revaclear (05/27/19 1252)     K Bath:   Dialysate K (mEq/L): 2 (05/27/19 1252)     Ca Bath:   Dialysate CA (mEq/L): 2.5 (05/27/19 1252)     Na/Bicarb:   Dialysate NA (mEq/L): 140 (05/27/19 1252)     Target Fluid Removal:   Goal/Amount of Fluid to Remove (mL): 4000 mL (05/27/19 1252)     Access       Type & Location:    Left lower arm AVF. +bruit/thrill  Patient sites disinfected. Patient sites cannulated x2 with 15G needles. Needles secured with gauze and tape. Needles aspirated, labs drawn, and flushed with normal saline. No access issues. Treatment started.      Labs       Obtained/Reviewed   Critical Results Called   Date when labs were drawn-05/27/19  Hgb- (hgb)@  K- (k)@  Ca-   Calcium   Date Value Ref Range Status   05/27/2019 8.4 (L) 8.5 - 10.1 MG/DL Final     Bun-   BUN   Date Value Ref Range Status   05/27/2019 37 (H) 6 - 20 MG/DL Final     Creat-   Creatinine   Date Value Ref Range Status   05/27/2019 7.40 (H) 0.55 - 1.02 MG/DL Final        Medications/ Blood Products Given       Name   Dose   Route and Time        Retacrit   10,000 units/1ml  1550 IV                         Blood Volume Processed (BVP):     73.7 L Net Fluid   Removed:   4000 mL      Comments   Time Out Done: Yes 1250  Primary Nurse Rpt Pre: Trice Ziegler RN  Primary Nurse Rpt Post: Trice Ziegler RN  Pt Education: Procdural  Care Plan: Continue dialysis as ordered  Tx Summary:Treatment completed per order. All possible blood rinsed back. Needles removed, hemostasis achieved with handheld pressure. Sites secured with gauze and tape.  +bruit/thrill. Patient alert, in bed (lowest/locked position) with call bell in reach. Report given to primary nurse. Admiting Diagnosis:  Pt's previous clinic- [address] [place] [address] Ext  Consent signed - Informed Consent Verified: Yes (05/27/19 1252)  [facility] Consent - Yes  Hepatitis Status- Negative Hep B Antigen 04/22/19; new specimen collected (05/27/19) which hasn't resulted at this time.   Machine #- Machine Number: [id number] (05/27/19 1251)

## 2019-05-27 NOTE — PROGRESS NOTES
[facility] MEDICINE RESIDENCY PROGRAM  PROGRESS NOTE     5/27/2019  PCP: Jesus Shah,      Assessment/Plan:   Sherine Maloney is a 66 y.o. female who is POD 1 right malleolar fracture s/p right malleolar open reduction internal fixation  . The Family Medicine Service was consulted for medical management. 24 hour events:   - Patient with chest pain overnight which resolved with nitroglycerin. - Will need hemodialysis today.      Right lateral malleolar fracture s/p right malleolar open reduction internal fixation.  - Pain regimen per Primary team.   - Ortho and CM coordinating equipment needed at home ( Wheelchair, beside commode, bariatric bed. - Warfarin restarted       ESRD on hemodialysis MWF.  - Nephrology consulted for dialysis     T2DM - home regimen is insulin aspart protamine/insulin aspart sliding scale  -SSI with hypoglycemic protocol  -POC glucose ACHS  -Will adjust as needed     COPD - Stable. On 2L O2 at home.  -O2 as needed to keep O2 88-94%  -Albuterol PRN     Anemia - Hgb pre op 9.7. BL 8.8-9.9  -Daily CBC     Gout  -Continue home Allopurinol 100mg daily     HLD   -Continue home Lipitor 80mg nightly     GERD   -Continue home Protonix 40mg daily     HFpEF  -  carvedilol BID     HTN   - Continue home medications of Norvasc 10 mg tab daily,     Chest pain  - Continue Imdur 120 mg daily  - Continue home prn NTG     CAD  - Continue plavix 75 mg tab daily  - Warfarin held   - Lipitor   - Coreg       DVT  - Warfarin held    FEN/GI - Per primary team.  Activity - Per primary team  DVT prophylaxis - Per primary team  GI prophylaxis - Protonix  Disposition - Plan to d/c to Per primary team.  Code Status - Full, discussed with patient / caregivers. Stable for discharge medically     Thank you very much for allowing us to participate in the care of this pleasant patient. The family medicine service will continue to follow the patient's medical progress along with you.  Please do not hesitate to page with any questions or to discuss the case. Subjective:   Patient denies any chest pain this morning. States she has foot pain 7/10 intensity. Urinating appropriately. Has not had a BM. Allergies: Allergies   Allergen Reactions    Contrast Dye [Iodine] Anaphylaxis     Tolerates when pre medicated w/ IV solumedrol and benadryl prior to procedure     Levaquin [Levofloxacin] Nausea and Vomiting    Morphine Hives and Itching       Objective:   Physical examination  Visit Vitals  /65 (BP 1 Location: Right arm, BP Patient Position: At rest)   Pulse 70   Temp 98 °F (36.7 °C)   Resp 18   Ht 5' 10\" (1.778 m)   Wt 309 lb 4.9 oz (140.3 kg)   SpO2 97%   BMI 44.38 kg/m²      Temp (24hrs), Av.1 °F (36.7 °C), Min:97.7 °F (36.5 °C), Max:98.5 °F (36.9 °C)     O2 Flow Rate (L/min): 2 l/min   O2 Device: Nasal cannula    Date 19 - 19 - 19 0659   Shift 3570-7904 6768-0156 24 Hour Total 1630-8435 2182-5434 24 Hour Total   INTAKE   Shift Total(mL/kg)         OUTPUT   Urine(mL/kg/hr)           Urine Occurrence(s) 1 x  1 x      Shift Total(mL/kg)         NET         Weight (kg) 140.3 140.3 140.3 140.3 140.3 140.3         Last 3 shifts:    No intake/output data recorded. General:   Alert, cooperative, no acute distress   Head:   Atraumatic   Eyes:   Conjunctivae clear   ENT:  Oral mucosa normal   Neck:  Supple, trachea midline, no adenopathy   No JVD   Back:    No CVA tenderness    Chest wall:    No tenderness or deformities    Lungs:   Clear to auscultation bilaterally    Heart:   Regular rhythm, no murmur   Abdomen:    Soft, non-tender   No masses or organomegaly    Extremities:  No edema or DVT signs,. Right lower extremity immobilized, bloody drainage on gauze.     Pulses:  Symmetric all extremities   Skin:  Warm and dry    No rashes or lesions   Neurologic:  Oriented   No focal deficits, Normal sensation in toes          Data Review:     Recent Labs 05/26/19  0353   WBC 9.1   HGB 8.6*   HCT 29.5*        Recent Labs     05/27/19  0456 05/26/19  0353    138   K 4.5 4.3    102   CO2 27 31   * 103*   BUN 37* 30*   CREA 7.40* 6.37*   CA 8.4* 8.5   INR  --  1.2*     Medications reviewed  Current Facility-Administered Medications   Medication Dose Route Frequency    nitroglycerin (NITROSTAT) tablet 0.4 mg  0.4 mg SubLINGual Q5MIN PRN    Warfarin pharmacy to dose   Other Rx Dosing/Monitoring    benzonatate (TESSALON) capsule 100 mg  100 mg Oral TID PRN    ondansetron (ZOFRAN ODT) tablet 8 mg  8 mg Oral Q6H PRN    sodium chloride (NS) flush 5-40 mL  5-40 mL IntraVENous Q8H    sodium chloride (NS) flush 5-40 mL  5-40 mL IntraVENous PRN    allopurinol (ZYLOPRIM) tablet 100 mg  100 mg Oral DAILY    amLODIPine (NORVASC) tablet 10 mg  10 mg Oral ACB    atorvastatin (LIPITOR) tablet 80 mg  80 mg Oral QHS    carvedilol (COREG) tablet 37.5 mg  37.5 mg Oral BID WITH MEALS    clopidogrel (PLAVIX) tablet 75 mg  75 mg Oral DAILY    isosorbide mononitrate ER (IMDUR) tablet 120 mg  120 mg Oral ACB    pantoprazole (PROTONIX) tablet 40 mg  40 mg Oral ACB/HS    insulin lispro (HUMALOG) injection   SubCUTAneous AC&HS    glucose chewable tablet 16 g  4 Tab Oral PRN    dextrose (D50W) injection syrg 12.5-25 g  12.5-25 g IntraVENous PRN    glucagon (GLUCAGEN) injection 1 mg  1 mg IntraMUSCular PRN    oxyCODONE IR (ROXICODONE) tablet 10 mg  10 mg Oral Q3H PRN    HYDROmorphone (DILAUDID) syringe 0.5 mg  0.5 mg IntraVENous Q1H PRN           Signed: Bailee Castillo MD   Resident, Family Medicine        Attending note: Attending note to follow. .. [address] [facility] Medicine Residency Attending Addendum:    I was NOT present with the resident during the interview & examination of the patient.  I personally interviewed the patient & repeated the critical or key portions of the exam.  I agree with the resident's note with the following additions:    66 y.o. female who  has a past medical history of  Sleep Apnea (2/16/2011), Aortic aneurysm ([address] CAD (coronary Artery Disease) Native Artery (2/16/2011), Chronic kidney disease, CKD (chronic kidney disease) stage 4, GFR 15-29 ml/min ([facility]) (2/10/2017), Coagulation disorder ([address] (06/2018), COPD (chronic obstructive pulmonary disease) ([address] Diabetic gastroparesis ([address] Diastolic heart failure ([address] (10/5/2012), DM type 2 causing neurological disease ([address] DM type 2 causing renal disease ([address] DM type 2 causing vascular disease ([address] Esophageal stricture (2012), G6PD deficiency ([address] GERD (gastroesophageal reflux disease), Gout, Hemodialysis access, AV graft ([address] (04/02/2017), Hypertension, ILD (interstitial lung disease) ([address] Morbid obesity ([address] OA (osteoarthritis), Ovarian cancer ([address] Rheumatoid arteritis, S/P left pulmonary artery pressure sensor implant placement (8/31/2017), Thromboembolus ([address] Tobacco use disorder (3/21/2012), Uterine cervix cancer ([address] and Vitamin D deficiency (8/9/2013). admitted for acute ankle fracture s/p R lateral maleolar ORIF and fixation of syndesmosis. Our team was consulted for medical management.       Pain well controlled. Continue home medications and HD on MWF per resident note. Ortho and CM coordinating home equipment for dispo.       See resident note for detailed assessment and plan.     Glynn Ho MD   Pt seen and examined on 5/27/2019

## 2019-05-27 NOTE — PROGRESS NOTES
[facility] Dosing Services: 05/27/19  Consult for Warfarin Dosing by Pharmacy by Dr. Jason Vines provided for this 66 y.o female for indication of Hx of DVT  Day of Therapy: resumed from home. (PTA: Warfarin 2mg po daily)  Dose to achieve an INR goal of 2-3       Order entered for  Warfarin  2 (mg) ordered to be given today at 18:00. Significant drug interactions: Plavix  Previous dose given 2 mg yesterday   PT/INR Lab Results   Component Value Date/Time    INR 1.2 (H) 05/27/2019 12:50 PM        Platelets Lab Results   Component Value Date/Time    PLATELET 026 82/28/3417 03:53 AM        H/H Lab Results   Component Value Date/Time    HGB 8.6 (L) 05/26/2019 03:53 AM          Pharmacy to follow daily and will provide subsequent Warfarin dosing based on clinical status.   Albert Dorsey)  Contact information [phone]

## 2019-05-27 NOTE — PROGRESS NOTES
Occupational Therapy      Attempted evaluation but patient just started dialysis and will be doing so for the next several hours. Patient refused therapy earlier in the day stating she did not feel well. Will follow up tomorrow for OT evaluation.     Thank you,    DANICA Will/TOOTIE

## 2019-05-27 NOTE — PROGRESS NOTES
PHYSICAL THERAPY NOTE:     Second attempt at treatment with pt unable to participate due to receiving Dialysis. \"She is just starting and it will take 3 1/2 hours. \"      Will continue to follow and attempt treatment tomorrow. Thank you.   [facility]

## 2019-05-28 VITALS
HEIGHT: 70 IN | BODY MASS INDEX: 41.95 KG/M2 | TEMPERATURE: 97.5 F | DIASTOLIC BLOOD PRESSURE: 65 MMHG | RESPIRATION RATE: 16 BRPM | WEIGHT: 293 LBS | OXYGEN SATURATION: 93 % | SYSTOLIC BLOOD PRESSURE: 127 MMHG | HEART RATE: 76 BPM

## 2019-05-28 PROBLEM — N18.6 ESRD (END STAGE RENAL DISEASE) (HCC): Status: ACTIVE | Noted: 2019-05-28

## 2019-05-28 LAB
ANION GAP SERPL CALC-SCNC: 6 MMOL/L (ref 5–15)
BACTERIA SPEC CULT: NORMAL
BUN SERPL-MCNC: 27 MG/DL (ref 6–20)
BUN/CREAT SERPL: 5 (ref 12–20)
CALCIUM SERPL-MCNC: 8.9 MG/DL (ref 8.5–10.1)
CHLORIDE SERPL-SCNC: 101 MMOL/L (ref 97–108)
CO2 SERPL-SCNC: 31 MMOL/L (ref 21–32)
CREAT SERPL-MCNC: 5.97 MG/DL (ref 0.55–1.02)
ERYTHROCYTE [DISTWIDTH] IN BLOOD BY AUTOMATED COUNT: 15.5 % (ref 11.5–14.5)
GLUCOSE BLD STRIP.AUTO-MCNC: 118 MG/DL (ref 65–100)
GLUCOSE BLD STRIP.AUTO-MCNC: 95 MG/DL (ref 65–100)
GLUCOSE SERPL-MCNC: 100 MG/DL (ref 65–100)
HCT VFR BLD AUTO: 29.7 % (ref 35–47)
HGB BLD-MCNC: 8.9 G/DL (ref 11.5–16)
INR PPP: 1.2 (ref 0.9–1.1)
MCH RBC QN AUTO: 29.9 PG (ref 26–34)
MCHC RBC AUTO-ENTMCNC: 30 G/DL (ref 30–36.5)
MCV RBC AUTO: 99.7 FL (ref 80–99)
NRBC # BLD: 0 K/UL (ref 0–0.01)
NRBC BLD-RTO: 0 PER 100 WBC
PLATELET # BLD AUTO: 243 K/UL (ref 150–400)
PMV BLD AUTO: 10.3 FL (ref 8.9–12.9)
POTASSIUM SERPL-SCNC: 4 MMOL/L (ref 3.5–5.1)
PROTHROMBIN TIME: 11.8 SEC (ref 9–11.1)
RBC # BLD AUTO: 2.98 M/UL (ref 3.8–5.2)
SERVICE CMNT-IMP: ABNORMAL
SERVICE CMNT-IMP: NORMAL
SERVICE CMNT-IMP: NORMAL
SODIUM SERPL-SCNC: 138 MMOL/L (ref 136–145)
WBC # BLD AUTO: 8 K/UL (ref 3.6–11)

## 2019-05-28 PROCEDURE — 74011250637 HC RX REV CODE- 250/637: Performed by: STUDENT IN AN ORGANIZED HEALTH CARE EDUCATION/TRAINING PROGRAM

## 2019-05-28 PROCEDURE — 85610 PROTHROMBIN TIME: CPT

## 2019-05-28 PROCEDURE — 97542 WHEELCHAIR MNGMENT TRAINING: CPT

## 2019-05-28 PROCEDURE — 82962 GLUCOSE BLOOD TEST: CPT

## 2019-05-28 PROCEDURE — 74011250636 HC RX REV CODE- 250/636: Performed by: NURSE PRACTITIONER

## 2019-05-28 PROCEDURE — 94760 N-INVAS EAR/PLS OXIMETRY 1: CPT

## 2019-05-28 PROCEDURE — 99218 HC RM OBSERVATION: CPT

## 2019-05-28 PROCEDURE — 97530 THERAPEUTIC ACTIVITIES: CPT

## 2019-05-28 PROCEDURE — 77010033678 HC OXYGEN DAILY

## 2019-05-28 PROCEDURE — 97110 THERAPEUTIC EXERCISES: CPT | Performed by: OCCUPATIONAL THERAPIST

## 2019-05-28 PROCEDURE — 74011250637 HC RX REV CODE- 250/637: Performed by: ORTHOPAEDIC SURGERY

## 2019-05-28 PROCEDURE — 97535 SELF CARE MNGMENT TRAINING: CPT | Performed by: OCCUPATIONAL THERAPIST

## 2019-05-28 PROCEDURE — 65270000029 HC RM PRIVATE

## 2019-05-28 PROCEDURE — 80048 BASIC METABOLIC PNL TOTAL CA: CPT

## 2019-05-28 PROCEDURE — 74011250636 HC RX REV CODE- 250/636: Performed by: ORTHOPAEDIC SURGERY

## 2019-05-28 PROCEDURE — 36415 COLL VENOUS BLD VENIPUNCTURE: CPT

## 2019-05-28 PROCEDURE — 85027 COMPLETE CBC AUTOMATED: CPT

## 2019-05-28 RX ORDER — DOXYCYCLINE 100 MG/1
100 CAPSULE ORAL 2 TIMES DAILY
Qty: 28 CAP | Refills: 0 | Status: SHIPPED | OUTPATIENT
Start: 2019-05-28 | End: 2019-06-11

## 2019-05-28 RX ORDER — VANCOMYCIN/0.9 % SOD CHLORIDE 1.5G/250ML
1500 PLASTIC BAG, INJECTION (ML) INTRAVENOUS ONCE
Status: COMPLETED | OUTPATIENT
Start: 2019-05-28 | End: 2019-05-28

## 2019-05-28 RX ORDER — WARFARIN 2 MG/1
2 TABLET ORAL EVERY EVENING
Status: DISCONTINUED | OUTPATIENT
Start: 2019-05-28 | End: 2019-05-28 | Stop reason: HOSPADM

## 2019-05-28 RX ADMIN — HYDROMORPHONE HYDROCHLORIDE 0.5 MG: 1 INJECTION, SOLUTION INTRAMUSCULAR; INTRAVENOUS; SUBCUTANEOUS at 06:44

## 2019-05-28 RX ADMIN — VANCOMYCIN HYDROCHLORIDE 1500 MG: 10 INJECTION, POWDER, LYOPHILIZED, FOR SOLUTION INTRAVENOUS at 11:51

## 2019-05-28 RX ADMIN — CLOPIDOGREL BISULFATE 75 MG: 75 TABLET ORAL at 08:42

## 2019-05-28 RX ADMIN — ONDANSETRON 8 MG: 4 TABLET, ORALLY DISINTEGRATING ORAL at 06:46

## 2019-05-28 RX ADMIN — PANTOPRAZOLE SODIUM 40 MG: 40 TABLET, DELAYED RELEASE ORAL at 06:46

## 2019-05-28 RX ADMIN — OXYCODONE HYDROCHLORIDE 10 MG: 5 TABLET ORAL at 15:43

## 2019-05-28 RX ADMIN — Medication 10 ML: at 05:26

## 2019-05-28 RX ADMIN — AMLODIPINE BESYLATE 10 MG: 5 TABLET ORAL at 06:45

## 2019-05-28 RX ADMIN — CARVEDILOL 37.5 MG: 12.5 TABLET, FILM COATED ORAL at 08:42

## 2019-05-28 RX ADMIN — HYDROMORPHONE HYDROCHLORIDE 0.5 MG: 1 INJECTION, SOLUTION INTRAMUSCULAR; INTRAVENOUS; SUBCUTANEOUS at 03:48

## 2019-05-28 RX ADMIN — HYDROMORPHONE HYDROCHLORIDE 0.5 MG: 1 INJECTION, SOLUTION INTRAMUSCULAR; INTRAVENOUS; SUBCUTANEOUS at 00:22

## 2019-05-28 RX ADMIN — HYDROMORPHONE HYDROCHLORIDE 0.5 MG: 1 INJECTION, SOLUTION INTRAMUSCULAR; INTRAVENOUS; SUBCUTANEOUS at 09:49

## 2019-05-28 RX ADMIN — HYDROMORPHONE HYDROCHLORIDE 0.5 MG: 1 INJECTION, SOLUTION INTRAMUSCULAR; INTRAVENOUS; SUBCUTANEOUS at 13:31

## 2019-05-28 RX ADMIN — ISOSORBIDE MONONITRATE 120 MG: 60 TABLET ORAL at 06:45

## 2019-05-28 NOTE — PROGRESS NOTES
[facility] [facility] Bishop Sands  YOB: 1955          Assessment & Plan:   ESRD on HD MWF  S/p ORIF R malleolar fx  Knee aspirate +CNS  HTN  Anemia    REC:  HD tomorrow and MWF  ALDO with HD  To get one dose vanc today.  D/w ortho       Subjective:   CC: f/u ESRD  HPI: For HD tomorrow   ROS: no n/v/sob  Current Facility-Administered Medications   Medication Dose Route Frequency    vancomycin (VANCOCIN) 1500 mg in  ml infusion  1,500 mg IntraVENous ONCE    warfarin (COUMADIN) tablet 2 mg  2 mg Oral QPM    nitroglycerin (NITROSTAT) tablet 0.4 mg  0.4 mg SubLINGual Q5MIN PRN    polyethylene glycol (MIRALAX) packet 17 g  17 g Oral DAILY    epoetin charlie-epbx (RETACRIT) injection 10,000 Units  10,000 Units SubCUTAneous DIALYSIS MON, WED & FRI    [START ON 5/29/2019] allopurinol (ZYLOPRIM) tablet 100 mg  100 mg Oral DIALYSIS MON, WED & FRI    Warfarin pharmacy to dose   Other Rx Dosing/Monitoring    benzonatate (TESSALON) capsule 100 mg  100 mg Oral TID PRN    ondansetron (ZOFRAN ODT) tablet 8 mg  8 mg Oral Q6H PRN    sodium chloride (NS) flush 5-40 mL  5-40 mL IntraVENous Q8H    sodium chloride (NS) flush 5-40 mL  5-40 mL IntraVENous PRN    amLODIPine (NORVASC) tablet 10 mg  10 mg Oral ACB    atorvastatin (LIPITOR) tablet 80 mg  80 mg Oral QHS    carvedilol (COREG) tablet 37.5 mg  37.5 mg Oral BID WITH MEALS    clopidogrel (PLAVIX) tablet 75 mg  75 mg Oral DAILY    isosorbide mononitrate ER (IMDUR) tablet 120 mg  120 mg Oral ACB    pantoprazole (PROTONIX) tablet 40 mg  40 mg Oral ACB/HS    insulin lispro (HUMALOG) injection   SubCUTAneous AC&HS    glucose chewable tablet 16 g  4 Tab Oral PRN    dextrose (D50W) injection syrg 12.5-25 g  12.5-25 g IntraVENous PRN    glucagon (GLUCAGEN) injection 1 mg  1 mg IntraMUSCular PRN    oxyCODONE IR (ROXICODONE) tablet 10 mg  10 mg Oral Q3H PRN    HYDROmorphone (DILAUDID) syringe 0.5 mg  0.5 mg IntraVENous Q1H PRN          Objective:     Vitals:  Blood pressure 109/61, pulse 79, temperature 97.5 °F (36.4 °C), resp. rate 16, height 5' 10\" (1.778 m), weight 140.3 kg (309 lb 4.9 oz), SpO2 95 %. Temp (24hrs), Av.1 °F (36.7 °C), Min:97.5 °F (36.4 °C), Max:98.8 °F (37.1 °C)      Intake and Output:  No intake/output data recorded.  1901 -  0700  In: -   Out: 4000     Physical Exam:               GENERAL ASSESSMENT: NAD  CHEST: CTA  HEART: S1S2  ABDOMEN: Soft,NT  EXTREMITY: no EDEMA      ECG/rhythm:    Data Review      No results for input(s): TNIPOC in the last 72 hours. No lab exists for component: ITNL   No results for input(s): CPK, CKMB, TROIQ in the last 72 hours. Recent Labs     19  0620 19  0456 19  0353    137 138   K 4.0 4.5 4.3    101 102   CO2 31 27 31   BUN 27* 37* 30*   CREA 5.97* 7.40* 6.37*    104* 103*   CA 8.9 8.4* 8.5   WBC 8.0  --  9.1   HGB 8.9*  --  8.6*   HCT 29.7*  --  29.5*     --  253      Recent Labs     19  0620 19  1250 19  0353   INR 1.2* 1.2* 1.2*   PTP 11.8* 11.8* 12.3*     Needs: urine analysis, urine sodium, protein and creatinine  Lab Results   Component Value Date/Time    Sodium,urine random 25 11/10/2014 12:40 PM    Creatinine, urine 145.29 2017 05:01 AM           : Mando Avalos MD  2019        [facility]:  [email or web address]  Cindy Luis office:  [address] [place] [address], [address] [place] [address] [address] [place] [address]  Phone: [phone]  Fax :     [phone]    Nani office:  [address] [facility]  [place], [facility] [address] [facility]  Phone - [phone]  Fax - [phone]

## 2019-05-28 NOTE — PROGRESS NOTES
Spiritual Care Assessment/Progress Note  Marika Mendezrosie      NAME: Leonardo Murray      MRN: [id number]  AGE: 66 y.o.  SEX: female  Islam Affiliation: Haley Diaz   Language: English     5/28/2019     Total Time (in minutes): 20     Spiritual Assessment begun in [facility] 4M POST SURG ORT 1 through conversation with:         [x]Patient        [x] Family    [] Friend(s)        Reason for Consult: Initial/Spiritual assessment, patient floor     Spiritual beliefs: (Please include comment if needed)     [x] Identifies with a monserrat tradition: Evangelical        [] Supported by a monserrat community:            [] Claims no spiritual orientation:           [] Seeking spiritual identity:                [] Adheres to an individual form of spirituality:           [] Not able to assess:                           Identified resources for coping:      [x] Prayer                               [] Music                  [] Guided Imagery     [x] Family/friends                 [] Pet visits     [] Devotional reading                         [] Unknown     [] Other:                                          Interventions offered during this visit: (See comments for more details)    Patient Interventions: Affirmation of emotions/emotional suffering, Prayer (actual), Affirmation of monserrat, Catharsis/review of pertinent events in supportive environment           Plan of Care:     [x] Support spiritual and/or cultural needs    [] Support AMD and/or advance care planning process      [] Support grieving process   [] Coordinate Rites and/or Rituals    [] Coordination with community clergy   [] No spiritual needs identified at this time   [] Detailed Plan of Care below (See Comments)  [] Make referral to Music Therapy  [] Make referral to Pet Therapy     [] Make referral to Addiction services  [] Make referral to [facility]  [] Make referral to Spiritual Care Partner  [] No future visits requested        [x] Follow up visits as needed     Comments:   visited patient, Kimberly Scruggs", for an initial spiritual assessment. Belle was awake, alert, and sitting up in bed. Her  Madina Villela was sitting at the bedside.  introduced herself and extended support through pastoral presence and emotional support. Belle openly discussed her health concerns, her recent surgery, and her recovery process. She is learning how to move differently in light of her surgery and expressed that this will take some time for her to adjust to.  continued to engage is active listening as Belle discussed numerous surgeries that she has had in the past and how her monserrat has given her strength through those times of illness and recovery. She also noted that having good support systems around her has been helpful as well.  inquired how she could best support Belle during this time. Belle asked for prayer.  prayed with Belle and Madina Villela as requested. Belle expressed gratitude for the 's visit and expressed no other concerns at this time. She is aware of 's availability.  will follow up as able and/or needed. Davian Vogt. Max Hunt.      Paging Service: [phone])

## 2019-05-28 NOTE — DISCHARGE INSTRUCTIONS
Surgery Discharge Instructions  Dr. Dario Borja     Activity:  As directed by your therapists    Diet:  You may resume your regular home diet. Medications:    Pain:  You have been given a prescription for pain control. Take this medication as directed. Do not take more than prescribed. If your pain is not controlled by the medication you were given, please call your surgeons office. Anticoagulation:  Resume your home Plavix and Coumadin    Stool Softeners:  If it important to have regular bowel movements. Pain medications can cause constipation. Stool softeners, warm prune juice, increasing your water intake, and increasing fiber can help in preventing constipation. Do not take laxatives if at all possible except in severe situations. It can result in a vicious cycle between constipation and diarrhea. Ice Pack   Application of ice will prevent and treat inflammation. You should use this continuously during the day for the first week if you are able to do so. Afterwards, use as needed depending on the amount of swelling. Skin Care:  Dressing: Keep splint on until follow up with Dr. Monica Prater: Your splint must remain completely dry. Home Health: If your physician feels it is necessary, you will be seen by a [address] [place] [address] agency after your discharge. Follow up Care:    Call to schedule your follow up appointment, if you do not already have one.     Reasons to call your surgeon:     Fever above 101 for more than 24 hours   Persistent pain in the calf or either leg   Pain uncontrolled by your pain medication   Nausea or vomiting   Redness, swelling or drainage from your incision   Numbness, tingling, or change in your affected extremity    Or any other concern

## 2019-05-28 NOTE — PROGRESS NOTES
Problem: Mobility Impaired (Adult and Pediatric)  Goal: *Acute Goals and Plan of Care (Insert Text)  Description  Physical Therapy Goals  Initiated 5/25/2019  1. Patient will move from supine to sit and sit to supine  in bed with modified independence within 7 day(s). 2.  Patient will transfer from bed to chair and chair to bed with minimal assistance/contact guard assist using the least restrictive device within 7 day(s). 3.  Patient will perform sit to stand with minimal assistance/contact guard assist within 7 day(s). 4.  Patient will ambulate with minimal assistance/contact guard assist for 5 feet with the least restrictive device within 7 day(s). 5.  Patient will ascend/descend 2 stairs with 1 handrail(s) with moderate assistance within 7 day(s).   5/28/2019 1532 by Josee Perez  Outcome: Progressing Towards Goal  Note:   PHYSICAL THERAPY TREATMENT  Patient: Elian Carreno (78 y.o. female)  Date: 5/28/2019  Diagnosis: S/P ORIF (open reduction internal fixation) fracture [Z96.7, Z87.81]  S/P ORIF (open reduction internal fixation) fracture [Z96.7, Z87.81]  ES[facility]end stage [facility]) ([facility] [N18.6] S/P ORIF (open reduction internal fixation) fracture  Procedure(s) (LRB):  RIGHT MALLEOLAR OPEN REDUCTION INTERNAL FIXATION with right knee aspiration (Right) 4 Days Post-Op  Precautions: NWB, Fall(HD)  Chart, physical therapy assessment, plan of care and goals were reviewed. ASSESSMENT:  Returned to pt room for family training. Gait belt issued for home use. Spouse and one other family member present.  successfully assisted pt bed<>BSC with verbal cues from therapist for proper donning of gait belt, assist technique and his body mechanics for safety. Spouse demonstrated safe technique for transfer with pt. Pt able to manage RLE for sit<>supine without assistance. Pt and spouse reports ramp is in place to enter home. Pt is cleared for discharge once DME received.     Progression toward goals:  ?    Improving appropriately and progressing toward goals  ? Improving slowly and progressing toward goals  ? Not making progress toward goals and plan of care will be adjusted     PLAN:  Patient continues to benefit from skilled intervention to address the above impairments. Continue treatment per established plan of care. Discharge Recommendations:  Home Health  Further Equipment Recommendations for Discharge:  dme ordered     SUBJECTIVE:   Patient stated ? .?    OBJECTIVE DATA SUMMARY:   Critical Behavior:  Neurologic State: Alert, Appropriate for age  Orientation Level: Oriented X4  Cognition: Appropriate decision making, Appropriate for age attention/concentration, Follows commands  Safety/Judgement: Awareness of environment, Fall prevention, Decreased awareness of need for safety, Insight into deficits  Functional Mobility Training:  Bed Mobility:  Rolling: Contact guard assistance  Supine to Sit: Contact guard assistance  Sit to Supine: Contact guard assistance           Transfers:  Sit to Stand: Minimum assistance;Assist x1  Stand to Sit: Minimum assistance;Assist x1     Stand Pivot Transfers: Minimal assistance  Bed to Chair: Minimum assistance;Assist x1                    Balance:  Sitting: Intact  Standing: With support  Standing - Static: Good  Standing - Dynamic : Fair  Ambulation/Gait Training:  Distance (ft): 2 Feet (ft)  Assistive Device: Walker, rolling;Gait belt  Ambulation - Level of Assistance: Minimal assistance;Assist x1        Gait Abnormalities: Decreased step clearance;Shuffling gait                                      Stairs:              Neuro Re-Education:    Therapeutic Exercises:     Pain:  Pain Scale 1: Numeric (0 - 10)  Pain Intensity 1: 8  Pain Location 1: Leg  Pain Orientation 1: Right  Pain Description 1: Aching  Pain Intervention(s) 1: Medication (see MAR)  Activity Tolerance:   good  Please refer to the flowsheet for vital signs taken during this treatment.   After treatment:   ?    Patient left in no apparent distress sitting up in chair  ? Patient left in no apparent distress in bed  ? Call bell left within reach  ? Nursing notified  ? Caregiver present  ? Bed alarm activated    COMMUNICATION/COLLABORATION:   The patient?s plan of care was discussed with: Registered Nurse    Amy Carmona   Time Calculation: 30 mins             5/28/2019 1213 by Kathy Phillips  Outcome: Progressing Towards Goal  Note:   PHYSICAL THERAPY TREATMENT  Patient: Kaela Bailey (89 y.o. female)  Date: 5/28/2019  Diagnosis: S/P ORIF (open reduction internal fixation) fracture [Z96.7, Z87.81]  S/P ORIF (open reduction internal fixation) fracture [Z96.7, Z87.81]  [facility] [N18.6] S/P ORIF (open reduction internal fixation) fracture  Procedure(s) (LRB):  RIGHT MALLEOLAR OPEN REDUCTION INTERNAL FIXATION with right knee aspiration (Right) 4 Days Post-Op  Precautions: NWB, Fall(HD)  Chart, physical therapy assessment, plan of care and goals were reviewed. ASSESSMENT:  Pt received in bed, agreeable to participate with physical therapy. CGA to come to EOB, able to manage RLE. Min A x 2 for sit<>stand using RW for steadying, SPT to [facility] and then to w/c with Leeanna maintaining NWB status. Required MOD A for ceasar hygiene.  present and attentive throughout session. Demonstrated w/c mobility x 50', pt fatigues quickly but is able to manage turns, fwd and backward. Pt tolerated siting in w/c x 30 min and assisted BTB with RLE elevated Awaiting w/c for home use to be delivered. Progression toward goals:  ?    Improving appropriately and progressing toward goals  ? Improving slowly and progressing toward goals  ? Not making progress toward goals and plan of care will be adjusted     PLAN:  Patient continues to benefit from skilled intervention to address the above impairments. Continue treatment per established plan of care.   Discharge Recommendations:  Home Health with family assistance  Further Equipment Recommendations for Discharge:   bariatric bedside commode, bariatric rolling walker and wheelchair 20\"      SUBJECTIVE:   Patient stated ? .?    OBJECTIVE DATA SUMMARY:   Critical Behavior:  Neurologic State: Alert  Orientation Level: Oriented X4  Cognition: Appropriate decision making, Appropriate safety awareness  Safety/Judgement: Awareness of environment  Functional Mobility Training:  Bed Mobility:     Supine to Sit: Contact guard assistance  Sit to Supine: Contact guard assistance           Transfers:  Sit to Stand: Minimum assistance;Assist x2; Additional time  Stand to Sit: Minimum assistance;Assist x2        Bed to Chair: Minimum assistance                    Balance:  Sitting: Intact  Standing: With support  Standing - Static: Good  Standing - Dynamic : Fair  Ambulation/Gait Training:  Distance (ft): 2 Feet (ft)(2 x 3 )                                                     Stairs:              Neuro Re-Education:    Therapeutic Exercises:     Pain:  Pain Scale 1: Numeric (0 - 10)  Pain Intensity 1: 8  Pain Location 1: Leg  Pain Orientation 1: Right  Pain Description 1: Aching  Pain Intervention(s) 1: Medication (see MAR)  Activity Tolerance:   Good  Please refer to the flowsheet for vital signs taken during this treatment. After treatment:   ?    Patient left in no apparent distress sitting up in chair  ? Patient left in no apparent distress in bed  ? Call bell left within reach  ? Nursing notified  ? Caregiver present  ?     Bed alarm activated    COMMUNICATION/COLLABORATION:   The patient?s plan of care was discussed with: Registered Nurse    Kimberly Dobson   Time Calculation: 40 mins

## 2019-05-28 NOTE — PROGRESS NOTES
Shift Summary    Bedside and Verbal shift change report given to Alonso Fields RN (oncoming nurse) by Bishop Stokes (offgoing nurse). Report included the following information SBAR, Kardex, MAR and Recent Results. + culture results called this AM.     Camron Colin with Ortho notified and expressed patient concerns regarding body fluid culture called 2 days ago. Spoke with Case management regarding patient concerns with getting wheelchair. Case management is working on it. Patient ok to discharge. Wheelchair and walker delivered to patient room. I have reviewed discharge instructions with the patient and spouse. The patient and spouse verbalized understanding. Patient has no further questions or concerns    Current Discharge Medication List      START taking these medications    Details   doxycycline (VIBRAMYCIN) 100 mg capsule Take 1 Cap by mouth two (2) times a day for 14 days. Qty: 28 Cap, Refills: 0         CONTINUE these medications which have NOT CHANGED    Details   warfarin (COUMADIN) 2 mg tablet Take 2 mg by mouth daily. Oxygen O2 2L NC 24/7      isosorbide mononitrate ER (IMDUR) 120 mg CR tablet TAKE 1 TABLET BY MOUTH EVERY DAY  Qty: 90 Tab, Refills: 3      insulin aspart protamine/insulin aspart (NOVOLOG MIX 70-30 U-100 INSULN) 100 unit/mL (70-30) injection 10-40 Units by SubCUTAneous route nightly as needed (BG > 160). Sliding Scale      albuterol (PROAIR HFA) 90 mcg/actuation inhaler Take 2 Puffs by inhalation every four (4) hours as needed for Wheezing. CARVEDILOL PO Take 37.5 mg by mouth two (2) times daily (with meals). nitroglycerin (NITROSTAT) 0.4 mg SL tablet 0.4 mg by SubLINGual route every five (5) minutes as needed for Chest Pain. Up to 3 doses. mupirocin (BACTROBAN) 2 % ointment Apply  to affected area four (4) times daily.   Qty: 22 g, Refills: 1    Associated Diagnoses: Boil      allopurinol (ZYLOPRIM) 100 mg tablet TAKE 1 TABLET BY MOUTH EVERY DAY  Qty: 30 Tab, Refills: 4    Associated Diagnoses: Acute gout due to renal impairment involving left foot      oxyCODONE-acetaminophen (PERCOCET) 7.5-325 mg per tablet Take 1 Tab by mouth two (2) times daily as needed for Pain for up to 30 days. Max Daily Amount: 2 Tabs. Qty: 60 Tab, Refills: 0    Associated Diagnoses: Chronic pain syndrome; Unstable angina (HCC)      pantoprazole (PROTONIX) 40 mg tablet TAKE 1 TABLET BY MOUTH TWICE A DAY FOR HEARTBURN  Qty: 180 Tab, Refills: 3    Associated Diagnoses: Gastroesophageal reflux disease, esophagitis presence not specified      amLODIPine (NORVASC) 10 mg tablet Take 10 mg by mouth Daily (before breakfast). clopidogrel (PLAVIX) 75 mg tab Take 1 Tab by mouth daily. Qty: 30 Tab, Refills: 11      diclofenac (VOLTAREN) 1 % gel Apply 2 g to affected area four (4) times daily. As needed for right knee pain  Qty: 100 g, Refills: 5    Associated Diagnoses: Chronic pain of right knee      ergocalciferol (VITAMIN D2) 50,000 unit capsule Take 50,000 Units by mouth every Tuesday. atorvastatin (LIPITOR) 80 mg tablet Take 80 mg by mouth nightly.

## 2019-05-28 NOTE — PROGRESS NOTES
5/28/2019     2:38 PM  Pt DC noted. Pt will be followed by [facility], and CM attached DC clinicals via [employer]. WC, BSC, and RW to be delivered to [facility] today prior to DC. Pt will resume dialysis at [facility] off [address] tomorrow as scheduled. Family installed ramp. ZES0 letter provided and explained (see chart). NN notified. No additional Dc service needs indicated. Pt's  will provide transport. 8:44 AM  CM received acceptance from [facility]. Wheelchair referral still pending with [facility]. Note: Pt does not want a bariatric WC as it will not fit through doorways at home.

## 2019-05-28 NOTE — PROGRESS NOTES
Orthopaedic Progress Note  Post Op day: 4 Days Post-Op    May 28, 2019 10:20 AM     Patient: Jamee Valdez MRN: [id number]  SSN: [id number]    YOB: 1955  Age: 66 y.o. Sex: female      Admit date:  2019  Date of Surgery:  2019   Procedures:  Procedure(s):  RIGHT MALLEOLAR OPEN REDUCTION INTERNAL FIXATION with right knee aspiration  Admitting Physician:  Carlos Alberto Pappas MD   Surgeon:  Allyssa Fitch) and Role:     * Sarai Blanco MD - Primary    Consulting Physician(s): Treatment Team: Attending Provider: Sarai Blanco MD; Consulting Provider: Therese Soto MD; Consulting Provider: Pete Peterson MD; Consulting Provider: Tom Mc MD; Consulting Provider: Dalia Sarkar MD; Utilization Review: Danie Tam; Care Manager: Lizet Bertrand; Utilization Review: Ev Hayes    SUBJECTIVE:     Jamee Valdez is a 66 y.o. female is 4 Days Post-Op s/p Procedure(s):    RIGHT MALLEOLAR OPEN REDUCTION INTERNAL FIXATION with right knee aspiration with an appropriate level of post-operative pain. Pt has been seen by Dr. Radha Estes multiple times over the last several months regarding her right knee pain and swelling. Culture shows contaminant of Staph Epi. Pt very concerned that knee could be infected and states her \"immune system is low\" due to her Hep C and needs  repeat treatments. No complaints of nausea, vomiting, dizziness, lightheadedness, chest pain, or shortness of breath. OBJECTIVE:       Physical Exam:  General: Alert, cooperative, no distress. Respiratory: Respirations unlabored  Neurological: Toes - sensation intact, moving freely   Dressing/Wound: Clean, dry and intact.     Vital Signs:        Patient Vitals for the past 8 hrs:   BP Temp Pulse Resp SpO2   19 0816 109/53 97.5 °F (36.4 °C) 74 16 100 %   19 0307 111/59 98 °F (36.7 °C) 75 16 97 %                                          Temp (24hrs), Av.1 °F (36.7 °C), Min:97.5 °F (36.4 °C), Max:98.8 °F (37.1 °C)      Labs:        Recent Labs     05/28/19  0620   HCT 29.7*   HGB 8.9*   INR 1.2*     Lab Results   Component Value Date/Time    Sodium 138 05/28/2019 06:20 AM    Potassium 4.0 05/28/2019 06:20 AM    Chloride 101 05/28/2019 06:20 AM    CO2 31 05/28/2019 06:20 AM    Glucose 100 05/28/2019 06:20 AM    BUN 27 (H) 05/28/2019 06:20 AM    Creatinine 5.97 (H) 05/28/2019 06:20 AM    Calcium 8.9 05/28/2019 06:20 AM       PT/OT:        Gait  Step Length: Left shortened(R NWB)  Gait Abnormalities: Antalgic, Decreased step clearance  Ambulation - Level of Assistance: Moderate assistance, Assist x2  Distance (ft): 2 Feet (ft)  Assistive Device: Walker, rolling, Gait belt       Patient mobility  Bed Mobility Training  Rolling: Stand-by assistance  Supine to Sit: Stand-by assistance  Sit to Supine: Stand-by assistance  Scooting: Stand-by assistance  Transfer Training  Sit to Stand: Maximum assistance, Assist x2(3rd person to stabilize the walker)  Stand to Sit: Moderate assistance, Assist x2  Bed to Chair: Moderate assistance, Assist x2(x 2 reps;  w/c <>BSC 1 rep)      Gait Training  Assistive Device: Walker, rolling, Gait belt  Ambulation - Level of Assistance:  Moderate assistance, Assist x2  Distance (ft): 2 Feet (ft)   Weight Bearing Status  Right Side Weight Bearing: Non-weight bearing        ASSESSMENT / PLAN:   Principal Problem:    S/P ORIF (open reduction internal fixation) fracture (5/24/2019)    Active Problems:    CAD (coronary Artery Disease) Native Artery (2/16/2011)      Overview: [place] 2004      1v CAD by cath - PCI OM with SAL      Cath 5/2004 - patent stent, no new disease      Lexiscan cardiolite 12/09- normal perfusion, EF 66%      Cath: 3/19/12: PA 55/30 mean 41, wedge 26, RA 24, EDP 35, LVG 55%, LM       normal, LAD normal, LCX - OM1 patent stent, OM2 prox 85% long, %       with L to R collaterals             JASEN on CPAP (2/16/2011)      Overview: Dr. Faustino De La Cruz CPAP Pulmonary HTN (3/21/2012)      HTN (hypertension) (10/1/2012)      Morbid obesity (10/1/2012)      Gastroparesis (10/1/2012)      DM (diabetes mellitus), type 2 with renal complications ([address] (1/7/4596)      COPD, severe ([address] (6/21/2017)      ILD (interstitial lung disease) ([address] (8/20/2017)      Warfarin anticoagulation (8/20/2017)      Hx of deep venous thrombosis (5/30/2018)      Diabetic gastroparesis (HCC) ()      Gout ()      ESRD on hemodialysis ([address] (6/23/2018)      (HFpEF) heart failure with preserved ejection fraction ([address] (9/23/2018)      ESRD (end stage renal disease) ([address] (5/28/2019)         S/P RIGHT MALLEOLAR OPEN REDUCTION INTERNAL FIXATION with right knee aspiration PT/OT/Rehab. Discussed case with Dr. Vera Landau and he discussed with patient. Will give 1-time dose of Vancomycin. Consulted with Dr. Nina Alvarado for dosage. 1500mg x 1 dose. Then doxycycline 100mg bid x 2 weeks. DVT Prophylaxis Plavix, coumadin - pharm to dose   Pain Continue current regimen   Discharge Disposition Home w/ Family when cleared by therapy. Planning for today. F/u with Dr. Vera Landau in the office as scheduled.      Signed By: Madison Skelton NP    RN, MSN, FNP-BC  Orthopedic Nurse Practitioner  [facility]

## 2019-05-28 NOTE — PHYSICIAN ADVISORY
Letter of Status Determination:  
Recommend hospitalization status upgraded from OBSERVATION  to INPATIENT  Status Pt Name:  Blaise Leonardo MR#  
KELLY # [id number] / 
[id number] Payor: Brooke Kessler / Plan: Aleks Trujillo / Product Type: Medicare /   
[employer]#  [id number] [facility]  408/01  @ [address] [facility] Hospitalization date  5/24/2019  5:33 AM  
Current Attending Physician  Franky Montanez MD  
Principal diagnosis  S/P ORIF (open reduction internal fixation) fracture [Z96.7, Z87.81] Clinicals  66 y.o. y.o  female hospitalized with above diagnosis The pt went through ORIF without any surgical complications. It is noted that the pt suffers from multiple complex chronic medical illnesses, including but not limited to known CAD, ESRD/HD, etc.  
 
Pt developed chest pain on the night of 5/26/19 that improved with S/l NTG. Family practice team were called in for medical management. It is also noted from the therapist's notes, that the pt has required assistance from three person with minimal activities. (Body mass index is 44.38 kg/m². ) Due to appropriate and necessary medical care, this pt's hospitalization has now exceeded two midnights . [place] ([facility]) criteria Does  NOT apply STATUS DETERMINATION  This patient is at above high risk of deterioration based on documented presenting clinical data, comorbid conditions, high risk of adverse events and current acute care course. Ms. Blaise Leonardo now meets Inpatient Admission status criteria in accordance with CMS regulation Section 43 .3. Specifically, due to medical necessity the patient's stay now exceeds Two Midnights. It is our recommendation that this patient's hospitalization status should be upgraded from  OBSERVATION to INPATIENT status. The final decision of the patient's hospitalization status depends on the attending physician's judgment Additional comments Payor: Himanshu Guo / Plan: [address] / Product Type: Medicare /   
  
 
Dai Mitchell MD MPH FACP Cell: [phone] Physician Advisor [address]  
   
Cell  [phone]   
 
 
[phone] Estiven Richardson

## 2019-05-28 NOTE — CDMP QUERY
Pt admitted for ORIF of malleolar fracture and is also noted to have history of COPD and is on home oxygen 2 L via nasal cannula. After careful further study and based on your medical judgment, please further specify if you are treating, managing or monitoring any of the following 
 
=> Chronic hypoxic respiratory failure on 2 L oxygen via nasal cannula continuous 24/7 
=> COPD related hypoxia requiring prn oxygen for hypoxia and comfort. 
=> Other explanation (please specify) 
=> Unable to determine Risk factors: obesity, COPD Clinical Indicators: PN states \"pt is on 2 L nasal cannula at home. Treatment: continue oxygen 2 L nasal cannula Thank you for your time. Rosi Quintanilla RN, BSN 
[phone]

## 2019-05-28 NOTE — PROGRESS NOTES
[address] [facility] MEDICINE RESIDENCY PROGRAM  PROGRESS NOTE     5/28/2019  PCP: Olga Edmondson,      Assessment/Plan:   Elian Carreno is a 66 y.o. female who is POD 1 right malleolar fracture s/p right malleolar open reduction internal fixation  . The Family Medicine Service was consulted for medical management. Patient is stable for discharge    24 hour events:   - Hemodialysis was performed last night     Right lateral malleolar fracture s/p right malleolar open reduction internal fixation.  - Pain regimen per Primary team.   - Ortho and CM coordinating equipment needed at home ( Wheelchair, beside commode, bariatric bed. - Warfarin restarted       ESRD on hemodialysis MWF.  - Nephrology consulted for dialysis     T2DM - home regimen is insulin aspart protamine/insulin aspart sliding scale  -SSI with hypoglycemic protocol  -POC glucose ACHS  -Will adjust as needed     COPD - Stable. On 2L O2 at home.  -O2 as needed to keep O2 88-94%  -Albuterol PRN     Anemia - Hgb pre op 9.7. BL 8.8-9.9  -Daily CBC     Gout  -Continue home Allopurinol 100mg daily     HLD   -Continue home Lipitor 80mg nightly     GERD   -Continue home Protonix 40mg daily     HFpEF  -  carvedilol BID     HTN   - Continue home medications of Norvasc 10 mg tab daily,     Chest pain  - Continue Imdur 120 mg daily  - Continue home prn NTG     CAD  - Continue plavix 75 mg tab daily  - Warfarin held   - Lipitor   - Coreg       DVT  - Warfarin held    FEN/GI - Per primary team.  Activity - Per primary team  DVT prophylaxis - Per primary team  GI prophylaxis - Protonix  Disposition - Plan to d/c to Per primary team.  Code Status - Full, discussed with patient / caregivers. Stable for discharge medically     Thank you very much for allowing us to participate in the care of this pleasant patient. The family medicine service will continue to follow the patient's medical progress along with you.  Please do not hesitate to page with any questions or to discuss the case. Subjective:   Complaining of right lower extremity pain, 8/10 intensity. Denies any chest pain or SOB. Urinating appropriately. Allergies: Allergies   Allergen Reactions    Contrast Dye [Iodine] Anaphylaxis     Tolerates when pre medicated w/ IV solumedrol and benadryl prior to procedure     Levaquin [Levofloxacin] Nausea and Vomiting    Morphine Hives and Itching       Objective:   Physical examination  Visit Vitals  /53 (BP 1 Location: Right arm, BP Patient Position: At rest)   Pulse 74   Temp 97.5 °F (36.4 °C)   Resp 16   Ht 5' 10\" (1.778 m)   Wt 309 lb 4.9 oz (140.3 kg)   SpO2 100%   BMI 44.38 kg/m²      Temp (24hrs), Av.1 °F (36.7 °C), Min:97.5 °F (36.4 °C), Max:98.8 °F (37.1 °C)     O2 Flow Rate (L/min): 2 l/min   O2 Device: Nasal cannula    Date 19 - 19 - 19 0659   Shift 2018-0730 7500-2853 24 Hour Total 0508-9828 1559-8827 24 Hour Total   INTAKE   Shift Total(mL/kg)         OUTPUT   Urine(mL/kg/hr)           Urine Occurrence(s) 1 x  1 x      Stool           Stool Occurrence(s) 0 x  0 x      Dialysis 4000  4000        NET Fluid Removed (mL) 4000  4000      Shift Total(mL/kg) 4000(28.5)  4000(28.5)      NET -4000  -4000      Weight (kg) 140.3 140.3 140.3 140.3 140.3 140.3         Last 3 shifts:    1901 -  0700  In: -   Out: 4000     General:   Alert, cooperative, no acute distress   Head:   Atraumatic   Eyes:   Conjunctivae clear   ENT:  Oral mucosa normal   Neck:  Supple, trachea midline, no adenopathy   No JVD   Back:    No CVA tenderness    Chest wall:    No tenderness or deformities    Lungs:   Clear to auscultation bilaterally    Heart:   Regular rhythm, no murmur   Abdomen:    Soft, non-tender   No masses or organomegaly    Extremities:  No edema or DVT signs,. Right lower extremity immobilized, bloody drainage on gauze.     Pulses:  Symmetric all extremities   Skin:  Warm and dry    No rashes or lesions   Neurologic:  Oriented   No focal deficits, Normal sensation in toes          Data Review:     Recent Labs     05/28/19  0620 05/26/19  0353   WBC 8.0 9.1   HGB 8.9* 8.6*   HCT 29.7* 29.5*    253     Recent Labs     05/28/19  0620 05/27/19  1250 05/27/19  0456 05/26/19  0353     --  137 138   K 4.0  --  4.5 4.3     --  101 102   CO2 31  --  27 31     --  104* 103*   BUN 27*  --  37* 30*   CREA 5.97*  --  7.40* 6.37*   CA 8.9  --  8.4* 8.5   INR 1.2* 1.2*  --  1.2*     Medications reviewed  Current Facility-Administered Medications   Medication Dose Route Frequency    nitroglycerin (NITROSTAT) tablet 0.4 mg  0.4 mg SubLINGual Q5MIN PRN    polyethylene glycol (MIRALAX) packet 17 g  17 g Oral DAILY    epoetin charlie-epbx (RETACRIT) injection 10,000 Units  10,000 Units SubCUTAneous DIALYSIS MON, WED & FRI    [START ON 5/29/2019] allopurinol (ZYLOPRIM) tablet 100 mg  100 mg Oral DIALYSIS MON, WED & FRI    Warfarin pharmacy to dose   Other Rx Dosing/Monitoring    benzonatate (TESSALON) capsule 100 mg  100 mg Oral TID PRN    ondansetron (ZOFRAN ODT) tablet 8 mg  8 mg Oral Q6H PRN    sodium chloride (NS) flush 5-40 mL  5-40 mL IntraVENous Q8H    sodium chloride (NS) flush 5-40 mL  5-40 mL IntraVENous PRN    amLODIPine (NORVASC) tablet 10 mg  10 mg Oral ACB    atorvastatin (LIPITOR) tablet 80 mg  80 mg Oral QHS    carvedilol (COREG) tablet 37.5 mg  37.5 mg Oral BID WITH MEALS    clopidogrel (PLAVIX) tablet 75 mg  75 mg Oral DAILY    isosorbide mononitrate ER (IMDUR) tablet 120 mg  120 mg Oral ACB    pantoprazole (PROTONIX) tablet 40 mg  40 mg Oral ACB/HS    insulin lispro (HUMALOG) injection   SubCUTAneous AC&HS    glucose chewable tablet 16 g  4 Tab Oral PRN    dextrose (D50W) injection syrg 12.5-25 g  12.5-25 g IntraVENous PRN    glucagon (GLUCAGEN) injection 1 mg  1 mg IntraMUSCular PRN    oxyCODONE IR (ROXICODONE) tablet 10 mg  10 mg Oral Q3H PRN    HYDROmorphone (DILAUDID) syringe 0.5 mg  0.5 mg IntraVENous Q1H PRN           Signed: Susie Thompson MD   Resident, Family Medicine        Attending note: Attending note to follow. .. [facility] Medicine Residency Attending Addendum:    I was NOT present with the resident during the interview & examination of the patient. I personally interviewed the patient & repeated the critical or key portions of the exam.  I agree with the resident's note with the following additions:    66 y.o. female who  has a past medical history of  Sleep Apnea (2/16/2011), Aortic aneurysm ([address] CAD (coronary Artery Disease) Native Artery (2/16/2011), Chronic kidney disease, CKD (chronic kidney disease) stage 4, GFR 15-29 ml/min ([address] (2/10/2017), Coagulation disorder ([address] (06/2018), COPD (chronic obstructive pulmonary disease) ([address] Diabetic gastroparesis ([address] Diastolic heart failure ([address] (10/5/2012), DM type 2 causing neurological disease ([address] DM type 2 causing renal disease ([address] DM type 2 causing vascular disease ([address] Esophageal stricture (2012), G6PD deficiency ([address] GERD (gastroesophageal reflux disease), Gout, Hemodialysis access, AV graft ([address] (04/02/2017), Hypertension, ILD (interstitial lung disease) ([address] Morbid obesity ([address] OA (osteoarthritis), Ovarian cancer ([address] Rheumatoid arteritis, S/P left pulmonary artery pressure sensor implant placement (8/31/2017), Thromboembolus ([address] Tobacco use disorder (3/21/2012), Uterine cervix cancer ([address] and Vitamin D deficiency (8/9/2013). admitted for acute ankle fracture s/p R lateral maleolar ORIF and fixation of syndesmosis. Our team was consulted for medical management.       Pain well controlled. Continue home medications and HD on MWF per resident note. Ortho and CM coordinating home equipment for dispo.       See resident note for detailed assessment and plan.     Yamila Finney MD   Pt seen and examined on 5/27/2019

## 2019-05-28 NOTE — PROGRESS NOTES
CCL in to speak with pt and answer questions re: pt's plan of care/tx plan, per request; however, pt states that all of her questions have been answered; Pt states that her doctor called her earlier this AM & answered all of her questions; Pt declines further questions and/or needs at this time.

## 2019-05-28 NOTE — PROGRESS NOTES
Pt voiced concern because she feels as if she should be receiving antibiotics. I asked if she had brought this up to the [facility] doctor who had rounded on her just prior to me entering her room at 0640. She said she did and he told her her admitting physician would have to prescribe that treatment if necessary. I then asked if her or her  who was at the bedside had spoken to or met our nursing supervisor Bailey S Niels Mccauley. They said they had not and I asked if it would be okay if I reached out to her and asked that she round on the patient sometime today. She agreed with that idea and said she apriciciated it. I passed this along to night time charge nurse and will be getting in touch with Esperanza this morning.

## 2019-05-28 NOTE — PROGRESS NOTES
Problem: Mobility Impaired (Adult and Pediatric)  Goal: *Acute Goals and Plan of Care (Insert Text)  Description  Physical Therapy Goals  Initiated 5/25/2019  1. Patient will move from supine to sit and sit to supine  in bed with modified independence within 7 day(s). 2.  Patient will transfer from bed to chair and chair to bed with minimal assistance/contact guard assist using the least restrictive device within 7 day(s). 3.  Patient will perform sit to stand with minimal assistance/contact guard assist within 7 day(s). 4.  Patient will ambulate with minimal assistance/contact guard assist for 5 feet with the least restrictive device within 7 day(s). 5.  Patient will ascend/descend 2 stairs with 1 handrail(s) with moderate assistance within 7 day(s). Outcome: Progressing Towards Goal  Note:   PHYSICAL THERAPY TREATMENT  Patient: Kaela Bailey (15 y.o. female)  Date: 5/28/2019  Diagnosis: S/P ORIF (open reduction internal fixation) fracture [Z96.7, Z87.81]  S/P ORIF (open reduction internal fixation) fracture [Z96.7, Z87.81]  ESRD (end stage renal disease) ([facility] [N18.6] S/P ORIF (open reduction internal fixation) fracture  Procedure(s) (LRB):  RIGHT MALLEOLAR OPEN REDUCTION INTERNAL FIXATION with right knee aspiration (Right) 4 Days Post-Op  Precautions: NWB, Fall(HD)  Chart, physical therapy assessment, plan of care and goals were reviewed. ASSESSMENT:  Pt received in bed, agreeable to participate with physical therapy. CGA to come to EOB, able to manage RLE. Min A x 2 for sit<>stand using RW for steadying, SPT to [facility] and then to w/c with Leeanna maintaining NWB status. Required MOD A for ceasar hygiene.  present and attentive throughout session. Demonstrated w/c mobility x 50', pt fatigues quickly but is able to manage turns, fwd and backward. Pt tolerated siting in w/c x 30 min and assisted BTB with RLE elevated Awaiting w/c for home use to be delivered. Progression toward goals:  ? Improving appropriately and progressing toward goals  ? Improving slowly and progressing toward goals  ? Not making progress toward goals and plan of care will be adjusted     PLAN:  Patient continues to benefit from skilled intervention to address the above impairments. Continue treatment per established plan of care. Discharge Recommendations:  Home Health with family assistance  Further Equipment Recommendations for Discharge:   bariatric bedside commode, bariatric rolling walker and wheelchair 20\"      SUBJECTIVE:   Patient stated ? .?    OBJECTIVE DATA SUMMARY:   Critical Behavior:  Neurologic State: Alert  Orientation Level: Oriented X4  Cognition: Appropriate decision making, Appropriate safety awareness  Safety/Judgement: Awareness of environment  Functional Mobility Training:  Bed Mobility:     Supine to Sit: Contact guard assistance  Sit to Supine: Contact guard assistance           Transfers:  Sit to Stand: Minimum assistance;Assist x2; Additional time  Stand to Sit: Minimum assistance;Assist x2        Bed to Chair: Minimum assistance                    Balance:  Sitting: Intact  Standing: With support  Standing - Static: Good  Standing - Dynamic : Fair  Ambulation/Gait Training:  Distance (ft): 2 Feet (ft)(2 x 3 )                                                     Stairs:              Neuro Re-Education:    Therapeutic Exercises:     Pain:  Pain Scale 1: Numeric (0 - 10)  Pain Intensity 1: 8  Pain Location 1: Leg  Pain Orientation 1: Right  Pain Description 1: Aching  Pain Intervention(s) 1: Medication (see MAR)  Activity Tolerance:   Good  Please refer to the flowsheet for vital signs taken during this treatment. After treatment:   ?    Patient left in no apparent distress sitting up in chair  ? Patient left in no apparent distress in bed  ? Call bell left within reach  ? Nursing notified  ? Caregiver present  ?     Bed alarm activated    COMMUNICATION/COLLABORATION:   The patient?s plan of care was discussed with: Registered Nurse    Scarlet Brought   Time Calculation: 40 mins

## 2019-05-28 NOTE — PROGRESS NOTES
Problem: Self Care Deficits Care Plan (Adult)  Goal: *Acute Goals and Plan of Care (Insert Text)  Description  Occupational Therapy Goals  Initiated 5/25/2019  1. Patient will perform lower body dressing with minimal assistance/contact guard assist within 7 day(s). 2.  Patient will perform toilet transfers with minimal assistance/contact guard assist within 7 day(s). 3.  Patient will perform all aspects of toileting with minimal assistance/contact guard assist within 7 day(s). 4.  Patient will participate in upper extremity therapeutic exercise/activities with supervision/set-up for 10 minutes within 7 day(s). 5.  Patient will utilize energy conservation techniques during functional activities with verbal cues within 7 day(s). Outcome: Progressing Towards Goal     OCCUPATIONAL THERAPY TREATMENT  Patient: Angela Lewis (63 y.o. female)  Date: 5/28/2019  Diagnosis: S/P ORIF (open reduction internal fixation) fracture [Z96.7, Z87.81]  S/P ORIF (open reduction internal fixation) fracture [Z96.7, Z87.81]  ESRD (end stage renal disease) ([facility] [address] [N18.6] S/P ORIF (open reduction internal fixation) fracture  Procedure(s) (LRB):  RIGHT MALLEOLAR OPEN REDUCTION INTERNAL FIXATION with right knee aspiration (Right) 4 Days Post-Op  Precautions: NWB, Fall(HD)  Chart, occupational therapy assessment, plan of care, and goals were reviewed. ASSESSMENT:  Pt making slow progress towards goals. She declined EOB and OOB activity this pm stating she was too tired from PT, but agreeable to home safety and home modification education and BUE yellow theraband resistive exer program.  Pt's  and daughter present and demonstrated good understanding of above. Recommend 24/7 assist for safety following her RLE NWB precautions and HH therapy as pt is declining SNF. Pt awaiting w/c to safely return home. Progression toward goals:  ?       Improving appropriately and progressing toward goals  ?        Improving slowly and progressing toward goals  ? Not making progress toward goals and plan of care will be adjusted     PLAN:  Patient continues to benefit from skilled intervention to address the above impairments. Continue treatment per established plan of care. Discharge Recommendations:  Home Health  Further Equipment Recommendations for Discharge:  wheelchair 20\"- pt states will not fit through doorways      SUBJECTIVE:   Patient stated ? I will be fine and can do all this stuff. ?    OBJECTIVE DATA SUMMARY:   Cognitive/Behavioral Status:  Neurologic State: Alert; Appropriate for age  Orientation Level: Oriented X4  Cognition: Appropriate decision making; Appropriate for age attention/concentration; Follows commands  Perception: Appears intact  Perseveration: No perseveration noted  Safety/Judgement: Awareness of environment; Fall prevention;Decreased awareness of need for safety; Insight into deficits    Functional Mobility and Transfers for ADLs:  Bed Mobility:  Supine to Sit: Contact guard assistance  Sit to Supine: Contact guard assistance    Transfers:  Sit to Stand: Minimum assistance;Assist x2; Additional time     Bed to Chair: Minimum assistance    Balance:  Sitting: Intact  Standing: With support  Standing - Static: Good  Standing - Dynamic : Fair    ADL Intervention:  Pt agreeable to home safety and home modification education and BUE yellow theraband resistive exer program.  Pt's  and daughter present and demonstrated good understanding of above. Educated pt and family on RLE NWB precautions, functional mobility inside and outside of home using w/c, ADL participation, fall prevention and w/c transfers. Cognitive Retraining  Safety/Judgement: Awareness of environment; Fall prevention;Decreased awareness of need for safety; Insight into deficits    Therapeutic Exercises:   Educated pt and her family on BUE yellow theraband home exer program to improve her strength and endurance for all functional tasks. Demonstrated shoulder flexion, abduction, horizontal abd, punches, biceps curls and triceps curls. Recommend pt perform at least 1x a day and she agreed. Pain:  Pain Scale 1: Numeric (0 - 10)  Pain Intensity 1: 8  Pain Location 1: Leg  Pain Orientation 1: Right  Pain Description 1: Aching  Pain Intervention(s) 1: Medication (see MAR)  Activity Tolerance:   Fair  Please refer to the flowsheet for vital signs taken during this treatment. After treatment:   ? Patient left in no apparent distress sitting up in chair  ? Patient left in no apparent distress in bed  ? Call bell left within reach  ? Nursing notified  ? Caregiver present-  and daughter  ?  Bed alarm activated    COMMUNICATION/COLLABORATION:   The patient?s plan of care was discussed with: Registered Nurse    Lemuel Gomez OT  Time Calculation: 26 mins

## 2019-05-28 NOTE — PROGRESS NOTES
[facility] Dosing Services: 05/28/19  Consult for Warfarin Dosing by Pharmacy by Dr. Kelly Navarrete provided for this 66 y.o female for indication of Hx of DVT  Day of Therapy: resumed from home. (PTA: Warfarin 2mg po daily)  Dose to achieve an INR goal of 2-3     Order entered for  Warfarin  2 (mg) ordered to be given today at 18:00. Significant drug interactions: Plavix  Previous dose given 2mg   PT/INR Lab Results   Component Value Date/Time    INR 1.2 (H) 05/28/2019 06:20 AM        Platelets Lab Results   Component Value Date/Time    PLATELET 468 76/91/9511 06:20 AM        H/H Lab Results   Component Value Date/Time    HGB 8.9 (L) 05/28/2019 06:20 AM          Pharmacy to follow daily and will provide subsequent Warfarin dosing based on clinical status.   Joanna Prabhakar)  Contact information [phone]

## 2019-05-28 NOTE — PROGRESS NOTES
Bedside and Verbal shift change report given to Janet Aleman (oncoming nurse) by Anniece Closs (offgoing nurse). Report included the following information SBAR, Kardex and Recent Results     Checked chart. Saqib Penn

## 2019-05-29 ENCOUNTER — PATIENT OUTREACH (OUTPATIENT)
Dept: FAMILY MEDICINE CLINIC | Age: 64
End: 2019-05-29

## 2019-05-29 NOTE — PROGRESS NOTES
Hospital Discharge Follow-Up      Date/Time:  2019 10:32 AM    Patient was admitted to [facility] on  and discharged on  for R Malleolar open reductions internal fixation. The physician discharge summary was available at the time of outreach. Patient was contacted within 3 business days of discharge. Top Challenges reviewed with the provider   Unable to use rollator at this time  Refused rehab, working with [facility]  Attends dialysis MW, nephrology sees her at dialysis  Chronic anemia     Method of communication with provider :none    Inpatient RRAT score: 35  Was this a readmission? no   Patient stated reason for the readmission: n/a    Nurse Navigator (NN) contacted the patient by telephone to perform post hospital discharge assessment. Verified name and  with patient as identifiers. Provided introduction to self, and explanation of the Nurse Navigator role. Reviewed discharge instructions and red flags with patient who verbalized understanding. Patient given an opportunity to ask questions and does not have any further questions or concerns at this time. The patient agrees to contact the PCP office for questions related to their healthcare. NN provided contact information for future reference. Disease Specific:   N/A    Summary of patient's top problems:  1. R lateral malleolar fracture/open reduction internal fixation- patient utilizing wheelchair and BSC at home. Family built ramp for access to home. Unable to bear weight on foot at this time. Working with [facility] PT, refused SNF for rehab. 2. CKD IV- patient attends dialysis [facility]. Creat (5.97)and BUN(27) trending down on d/c.      Home Health orders at discharge: PT, [facility]: [facility]  Date of initial visit: 19    Durable Medical Equipment ordered/company: BSC, WC, RW; [facility]  Durable Medical Equipment received: all    Barriers to care? lack of knowledge about disease    Advance Care Planning:   Does patient have an Advance Directive:  not on file     Medication(s):   New Medications at Discharge: doxycycline  Changed Medications at Discharge: none  Discontinued Medications at Discharge: none    Medication reconciliation was performed with patient, who verbalizes understanding of administration of home medications. There were no barriers to obtaining medications identified at this time. Referral to Pharm D needed: no     Current Outpatient Medications   Medication Sig    doxycycline (VIBRAMYCIN) 100 mg capsule Take 1 Cap by mouth two (2) times a day for 14 days.  warfarin (COUMADIN) 2 mg tablet Take 2 mg by mouth daily.  CARVEDILOL PO Take 37.5 mg by mouth two (2) times daily (with meals).  nitroglycerin (NITROSTAT) 0.4 mg SL tablet 0.4 mg by SubLINGual route every five (5) minutes as needed for Chest Pain. Up to 3 doses.  Oxygen O2 2L NC 24/7    mupirocin (BACTROBAN) 2 % ointment Apply  to affected area four (4) times daily. (Patient taking differently: Apply  to affected area four (4) times daily. Boil vaginal area)    allopurinol (ZYLOPRIM) 100 mg tablet TAKE 1 TABLET BY MOUTH EVERY DAY (Patient taking differently: TAKE 1 TABLET BY MOUTH EVERY DAY       Every morning before breakfast)    oxyCODONE-acetaminophen (PERCOCET) 7.5-325 mg per tablet Take 1 Tab by mouth two (2) times daily as needed for Pain for up to 30 days. Max Daily Amount: 2 Tabs.  isosorbide mononitrate ER (IMDUR) 120 mg CR tablet TAKE 1 TABLET BY MOUTH EVERY DAY (Patient taking differently: Take 120 mg by mouth Daily (before breakfast). TAKE 1 TABLET BY MOUTH EVERY DAY)    pantoprazole (PROTONIX) 40 mg tablet TAKE 1 TABLET BY MOUTH TWICE A DAY FOR HEARTBURN (Patient taking differently: Take 40 mg by mouth ACB/HS. TAKE 1 TABLET BY MOUTH TWICE A DAY FOR HEARTBURN)    amLODIPine (NORVASC) 10 mg tablet Take 10 mg by mouth Daily (before breakfast).     clopidogrel (PLAVIX) 75 mg tab Take 1 Tab by mouth daily. (Patient taking differently: Take 75 mg by mouth Daily (before breakfast). )    diclofenac (VOLTAREN) 1 % gel Apply 2 g to affected area four (4) times daily. As needed for right knee pain    insulin aspart protamine/insulin aspart (NOVOLOG MIX 70-30 U-100 INSULN) 100 unit/mL (70-30) injection 10-40 Units by SubCUTAneous route nightly as needed (BG > 160). Sliding Scale    albuterol (PROAIR HFA) 90 mcg/actuation inhaler Take 2 Puffs by inhalation every four (4) hours as needed for Wheezing.  ergocalciferol (VITAMIN D2) 50,000 unit capsule Take 50,000 Units by mouth every Tuesday.  atorvastatin (LIPITOR) 80 mg tablet Take 80 mg by mouth nightly. No current facility-administered medications for this visit. There are no discontinued medications. [facility] follow up appointment(s):   Future Appointments   Date Time Provider Meagan Mcintyre   2019 11:20 AM Kia Levine,  [facility]   2019  1:20 PM Radha Parekh MD [address] [place] [address]      Non-[facility] follow up appointment(s): sees Nephrology at dialysis  [facility]  patient has utilized in the past, aware of resource and contact info       Goals      Attends follow-up appointments as directed. 19 F/u appt with Dr. Sadiq Beard . Nephrology appts at dialysis. To schedule f/u with Dr. Katty Duckworth. Will follow up in two weeks. -APM       Returns to baseline activity level. 19 Patient non weight bearing on R foot. Using wheelchair and BSC. Able to get around house with family help. Has rollator at home. Working with  [facility] beginning today. Will follow up in two weeks. -APM       Understands red flags post discharge. 19 Patient aware of s/s infection to report. Denies symptoms of infection at this time.  -APM

## 2019-06-04 ENCOUNTER — DOCUMENTATION ONLY (OUTPATIENT)
Dept: FAMILY MEDICINE CLINIC | Age: 64
End: 2019-06-04

## 2019-06-05 ENCOUNTER — DOCUMENTATION ONLY (OUTPATIENT)
Dept: FAMILY MEDICINE CLINIC | Age: 64
End: 2019-06-05

## 2019-06-07 LAB
BACTERIA SPEC CULT: ABNORMAL
GRAM STN SPEC: ABNORMAL
SERVICE CMNT-IMP: ABNORMAL

## 2019-06-10 ENCOUNTER — DOCUMENTATION ONLY (OUTPATIENT)
Dept: FAMILY MEDICINE CLINIC | Age: 64
End: 2019-06-10

## 2019-06-12 ENCOUNTER — HOSPITAL ENCOUNTER (OUTPATIENT)
Dept: GENERAL RADIOLOGY | Age: 64
Discharge: HOME | End: 2019-06-12
Payer: MEDICARE

## 2019-06-12 ENCOUNTER — DOCUMENTATION ONLY (OUTPATIENT)
Dept: FAMILY MEDICINE CLINIC | Age: 64
End: 2019-06-12

## 2019-06-12 DIAGNOSIS — M25.571 RIGHT ANKLE PAIN: ICD-10-CM

## 2019-06-12 PROCEDURE — 73610 X-RAY EXAM OF ANKLE: CPT

## 2019-06-13 ENCOUNTER — OFFICE VISIT (OUTPATIENT)
Dept: FAMILY MEDICINE CLINIC | Age: 64
End: 2019-06-13

## 2019-06-13 VITALS
OXYGEN SATURATION: 93 % | SYSTOLIC BLOOD PRESSURE: 123 MMHG | WEIGHT: 293 LBS | HEIGHT: 70 IN | HEART RATE: 66 BPM | DIASTOLIC BLOOD PRESSURE: 65 MMHG | TEMPERATURE: 98.1 F | RESPIRATION RATE: 20 BRPM | BODY MASS INDEX: 41.95 KG/M2

## 2019-06-13 DIAGNOSIS — Z51.81 ENCOUNTER FOR MONITORING COUMADIN THERAPY: ICD-10-CM

## 2019-06-13 DIAGNOSIS — J84.9 INTERSTITIAL LUNG DISEASE (HCC): ICD-10-CM

## 2019-06-13 DIAGNOSIS — I26.99 OTHER PULMONARY EMBOLISM WITHOUT ACUTE COR PULMONALE, UNSPECIFIED CHRONICITY (HCC): Primary | ICD-10-CM

## 2019-06-13 DIAGNOSIS — Z79.01 ENCOUNTER FOR MONITORING COUMADIN THERAPY: ICD-10-CM

## 2019-06-13 LAB
INR BLD: 1.4
PT POC: 16.8 SECONDS
VALID INTERNAL CONTROL?: YES

## 2019-06-13 RX ORDER — FLUTICASONE FUROATE AND VILANTEROL 100; 25 UG/1; UG/1
1 POWDER RESPIRATORY (INHALATION) DAILY
Qty: 1 INHALER | Refills: 5 | Status: SHIPPED | OUTPATIENT
Start: 2019-06-13 | End: 2019-12-13

## 2019-06-13 RX ORDER — ENOXAPARIN SODIUM 150 MG/ML
140 INJECTION SUBCUTANEOUS DAILY
Qty: 14 SYRINGE | Refills: 0 | Status: SHIPPED | OUTPATIENT
Start: 2019-06-13 | End: 2019-12-13

## 2019-06-13 RX ORDER — WARFARIN 2 MG/1
4 TABLET ORAL DAILY
Qty: 60 TAB | Refills: 0
Start: 2019-06-13 | End: 2019-06-18

## 2019-06-13 RX ORDER — ENOXAPARIN SODIUM 150 MG/ML
140 INJECTION SUBCUTANEOUS ONCE
Qty: 14 SYRINGE | Refills: 0 | Status: SHIPPED | OUTPATIENT
Start: 2019-06-13 | End: 2019-06-13 | Stop reason: SDUPTHER

## 2019-06-13 NOTE — PROGRESS NOTES
Shine Honeycutt is a 66 y.o. female    Chief Complaint   Patient presents with   [facility] Follow Up     [facility] Dx:RIGHT LATERAL MALLEOLAR FRACTURE and [place] Dx: SOB during dialysis    Medication Refill     Allopurinol    Coagulation disorder     1. Have you been to the ER, urgent care clinic since your last visit? Hospitalized since your last visit? See chief complaint. 2. Have you seen or consulted any other health care providers outside of the [address] [place] [address] since your last visit? Include any pap smears or colon screening.  No     Visit Vitals  /65 (BP 1 Location: Right arm, BP Patient Position: Sitting)   Pulse 66   Temp 98.1 °F (36.7 °C) (Oral)   Resp 20   Ht 5' 10\" (1.778 m)   Wt 301 lb (136.5 kg)   SpO2 93%   BMI 43.19 kg/m²

## 2019-06-13 NOTE — PROGRESS NOTES
Jodie Dodson is a 66 y.o. female   Chief Complaint   Patient presents with   [facility] Follow Up     [facility] Dx:RIGHT LATERAL MALLEOLAR FRACTURE and [facility]: SOB during dialysis    Medication Refill     Allopurinol    Coagulation disorder    pt here for f/ from her ORIF which went well. Pt also went to [facility] due to at dialysis and was having breathing issues. Pt had a CT PA and was found to have a retained foreign body in her LLL pulmonary artery suspected from a broken off piece of port that was removed 9/18 after being placed in 6/18. IR had evaluated and it was determined risks outweighed benefits for removal.      Pt continues to have SOB and CP. Pt does have ILD and stopped smoking 5 years ago. Pt is managed by pulm. Pt also subtherapuetic INR today at 1.4. Pt is unable to afford the copay with eliquis and xarelto. These were both over $200. Will bridge to coumadin with lovenox for now since sub therapeutic at 1.4 and pt high risk after ortho sx and immobilized and hx of dvt pe.      she is a 66y.o. year old female who presents for evalution. Reviewed PmHx, RxHx, FmHx, SocHx, AllgHx and updated and dated in the chart. Review of Systems - negative except as listed above in the HPI    Objective:     Vitals:    06/13/19 1526   BP: 123/65   Pulse: 66   Resp: 20   Temp: 98.1 °F (36.7 °C)   TempSrc: Oral   SpO2: 93%   Weight: 301 lb (136.5 kg)   Height: 5' 10\" (1.778 m)       Current Outpatient Medications   Medication Sig    warfarin (COUMADIN) 2 mg tablet Take 2 Tabs by mouth daily.  fluticasone furoate-vilanterol (BREO ELLIPTA) 100-25 mcg/dose inhaler Take 1 Puff by inhalation daily.  enoxaparin (LOVENOX) injection 140 mg by SubCUTAneous route daily.  CARVEDILOL PO Take 37.5 mg by mouth two (2) times daily (with meals).  nitroglycerin (NITROSTAT) 0.4 mg SL tablet 0.4 mg by SubLINGual route every five (5) minutes as needed for Chest Pain. Up to 3 doses.     Oxygen O2 2L NC 24/7    mupirocin (BACTROBAN) 2 % ointment Apply  to affected area four (4) times daily. (Patient taking differently: Apply  to affected area four (4) times daily. Boil vaginal area)    allopurinol (ZYLOPRIM) 100 mg tablet TAKE 1 TABLET BY MOUTH EVERY DAY (Patient taking differently: TAKE 1 TABLET BY MOUTH EVERY DAY       Every morning before breakfast)    isosorbide mononitrate ER (IMDUR) 120 mg CR tablet TAKE 1 TABLET BY MOUTH EVERY DAY (Patient taking differently: Take 120 mg by mouth Daily (before breakfast). TAKE 1 TABLET BY MOUTH EVERY DAY)    pantoprazole (PROTONIX) 40 mg tablet TAKE 1 TABLET BY MOUTH TWICE A DAY FOR HEARTBURN (Patient taking differently: Take 40 mg by mouth ACB/HS. TAKE 1 TABLET BY MOUTH TWICE A DAY FOR HEARTBURN)    amLODIPine (NORVASC) 10 mg tablet Take 10 mg by mouth Daily (before breakfast).  clopidogrel (PLAVIX) 75 mg tab Take 1 Tab by mouth daily. (Patient taking differently: Take 75 mg by mouth Daily (before breakfast). )    diclofenac (VOLTAREN) 1 % gel Apply 2 g to affected area four (4) times daily. As needed for right knee pain    insulin aspart protamine/insulin aspart (NOVOLOG MIX 70-30 U-100 INSULN) 100 unit/mL (70-30) injection 10-40 Units by SubCUTAneous route nightly as needed (BG > 160). Sliding Scale    albuterol (PROAIR HFA) 90 mcg/actuation inhaler Take 2 Puffs by inhalation every four (4) hours as needed for Wheezing.  ergocalciferol (VITAMIN D2) 50,000 unit capsule Take 50,000 Units by mouth every Tuesday.  atorvastatin (LIPITOR) 80 mg tablet Take 80 mg by mouth nightly. No current facility-administered medications for this visit.         Physical Examination: General appearance - alert, well appearing, and in no distress  Chest - clear to auscultation, no wheezes, rales or rhonchi, symmetric air entry  Heart - normal rate, regular rhythm, normal S1, S2, no murmurs, rubs, clicks or gallops      Assessment/ Plan:   Diagnoses and all orders for this visit:    1. Other pulmonary embolism without acute cor pulmonale, unspecified chronicity (HCC)  -     warfarin (COUMADIN) 2 mg tablet; Take 2 Tabs by mouth daily. -     enoxaparin (LOVENOX) injection; 140 mg by SubCUTAneous route daily. 2. Encounter for monitoring Coumadin therapy  -     AMB POC PT/INR    3. Interstitial lung disease (HCC)  -     fluticasone furoate-vilanterol (BREO ELLIPTA) 100-25 mcg/dose inhaler; Take 1 Puff by inhalation daily. records from recent hospitalization sent for scanning into chart  Follow-up and Dispositions    · Return in about 5 days (around 6/18/2019), or if symptoms worsen or fail to improve. I have discussed the diagnosis with the patient and the intended plan as seen in the above orders. The patient has received an after-visit summary and questions were answered concerning future plans. Pt conveyed understanding of plan.     Medication Side Effects and Warnings were discussed with patient      Keyanna Abreu DO

## 2019-06-13 NOTE — PATIENT INSTRUCTIONS
A Healthy Lifestyle: Care Instructions  Your Care Instructions    A healthy lifestyle can help you feel good, stay at a healthy weight, and have plenty of energy for both work and play. A healthy lifestyle is something you can share with your whole family. A healthy lifestyle also can lower your risk for serious health problems, such as high blood pressure, heart disease, and diabetes. You can follow a few steps listed below to improve your health and the health of your family. Follow-up care is a key part of your treatment and safety. Be sure to make and go to all appointments, and call your doctor if you are having problems. It's also a good idea to know your test results and keep a list of the medicines you take. How can you care for yourself at home? · Do not eat too much sugar, fat, or fast foods. You can still have dessert and treats now and then. The goal is moderation. · Start small to improve your eating habits. Pay attention to portion sizes, drink less juice and soda pop, and eat more fruits and vegetables. ? Eat a healthy amount of food. A 3-ounce serving of meat, for example, is about the size of a deck of cards. Fill the rest of your plate with vegetables and whole grains. ? Limit the amount of soda and sports drinks you have every day. Drink more water when you are thirsty. ? Eat at least 5 servings of fruits and vegetables every day. It may seem like a lot, but it is not hard to reach this goal. A serving or helping is 1 piece of fruit, 1 cup of vegetables, or 2 cups of leafy, raw vegetables. Have an apple or some carrot sticks as an afternoon snack instead of a candy bar. Try to have fruits and/or vegetables at every meal.  · Make exercise part of your daily routine. You may want to start with simple activities, such as walking, bicycling, or slow swimming. Try to be active 30 to 60 minutes every day. You do not need to do all 30 to 60 minutes all at once.  For example, you can exercise 3 times a day for 10 or 20 minutes. Moderate exercise is safe for most people, but it is always a good idea to talk to your doctor before starting an exercise program.  · Keep moving. Yovany Leap the lawn, work in the garden, or [employer]. Take the stairs instead of the elevator at work. · If you smoke, quit. People who smoke have an increased risk for heart attack, stroke, cancer, and other lung illnesses. Quitting is hard, but there are ways to boost your chance of quitting tobacco for good. ? Use nicotine gum, patches, or lozenges. ? Ask your doctor about stop-smoking programs and medicines. ? Keep trying. In addition to reducing your risk of diseases in the future, you will notice some benefits soon after you stop using tobacco. If you have shortness of breath or asthma symptoms, they will likely get better within a few weeks after you quit. · Limit how much alcohol you drink. Moderate amounts of alcohol (up to 2 drinks a day for men, 1 drink a day for women) are okay. But drinking too much can lead to liver problems, high blood pressure, and other health problems. Family health  If you have a family, there are many things you can do together to improve your health. · Eat meals together as a family as often as possible. · Eat healthy foods. This includes fruits, vegetables, lean meats and dairy, and whole grains. · Include your family in your fitness plan. Most people think of activities such as jogging or tennis as the way to fitness, but there are many ways you and your family can be more active. Anything that makes you breathe hard and gets your heart pumping is exercise. Here are some tips:  ? Walk to do errands or to take your child to school or the bus.  ? Go for a family bike ride after dinner instead of watching TV. Where can you learn more? Go to http://lindsay-kai.info/. Enter F084 in the search box to learn more about \"A Healthy Lifestyle: Care Instructions. \"  Current as of: September 11, 2018  Content Version: 11.9  © 9993-4258 [employer]. Care instructions adapted under license by [employer] (which disclaims liability or warranty for this information). If you have questions about a medical condition or this instruction, always ask your healthcare professional. [address] any warranty or liability for your use of this information.

## 2019-06-14 ENCOUNTER — PATIENT OUTREACH (OUTPATIENT)
Dept: FAMILY MEDICINE CLINIC | Age: 64
End: 2019-06-14

## 2019-06-14 NOTE — LETTER
6/17/2019 11:56 AM 
 
Ms. Ignacio Chisholm [address] [address] My name is Pollo quezada. I am the nurse navigator on Dr. Mike Bach team. We spoke recently regarding your hospitalization. I follow up with patients weekly for one month following their discharge from the hospital.  I have tried to reach you on this matter. Please contact me at your convenience at [phone]. We appreciate the confidence youve shown by selecting us to provide your healthcare needs and look forward to hearing from you. Sincerely, Jessica Palumbo RN

## 2019-06-17 ENCOUNTER — DOCUMENTATION ONLY (OUTPATIENT)
Dept: FAMILY MEDICINE CLINIC | Age: 64
End: 2019-06-17

## 2019-06-17 NOTE — PROGRESS NOTES
06/17/19 Patient attended PCP appt 5/31. NN unable to contact patient at this outreach, will send Get In Touch letter and continue to follow chart. Bridged coumadin with lovenox at PCP appt. New inhaler ordered. NN will attempt to follow up on this next week. Has second PCP f/u tomorrow, 6/18.  cardiology 6/21-APM

## 2019-06-18 ENCOUNTER — DOCUMENTATION ONLY (OUTPATIENT)
Dept: FAMILY MEDICINE CLINIC | Age: 64
End: 2019-06-18

## 2019-06-18 ENCOUNTER — OFFICE VISIT (OUTPATIENT)
Dept: FAMILY MEDICINE CLINIC | Age: 64
End: 2019-06-18

## 2019-06-18 VITALS
OXYGEN SATURATION: 97 % | HEART RATE: 64 BPM | WEIGHT: 293 LBS | TEMPERATURE: 98.4 F | SYSTOLIC BLOOD PRESSURE: 116 MMHG | RESPIRATION RATE: 14 BRPM | DIASTOLIC BLOOD PRESSURE: 63 MMHG | BODY MASS INDEX: 41.95 KG/M2 | HEIGHT: 70 IN

## 2019-06-18 DIAGNOSIS — Z51.81 ENCOUNTER FOR MONITORING COUMADIN THERAPY: Primary | ICD-10-CM

## 2019-06-18 DIAGNOSIS — Z79.01 ENCOUNTER FOR MONITORING COUMADIN THERAPY: Primary | ICD-10-CM

## 2019-06-18 DIAGNOSIS — I26.99 OTHER PULMONARY EMBOLISM WITHOUT ACUTE COR PULMONALE, UNSPECIFIED CHRONICITY (HCC): ICD-10-CM

## 2019-06-18 DIAGNOSIS — Z79.01 WARFARIN ANTICOAGULATION: ICD-10-CM

## 2019-06-18 LAB
INR BLD: 4.1
PT POC: 48.7 SECONDS
VALID INTERNAL CONTROL?: YES

## 2019-06-18 RX ORDER — WARFARIN 2 MG/1
TABLET ORAL
Qty: 45 TAB | Refills: 0
Start: 2019-06-18 | End: 2019-09-03 | Stop reason: SDUPTHER

## 2019-06-18 NOTE — PROGRESS NOTES
Gee Cooper is a 66 y.o. female   Chief Complaint   Patient presents with    Coagulation disorder     INR check    Pt here for recheck of her INR pt is now taking 4mg daily up from 2mg daily when she was at 1.4 last week. Will have pt skip today and tomorrow and then restart. Pt very concerned about piece of tubing in her pulmonary artery and we went over pulmonary anatomy to help put her mind at ease. Retrieval risks>benefits    she is a 66y.o. year old female who presents for evalution. Reviewed PmHx, RxHx, FmHx, SocHx, AllgHx and updated and dated in the chart. Review of Systems - negative except as listed above in the HPI    Objective:     Vitals:    06/18/19 1437   BP: 116/63   Pulse: 64   Resp: 14   Temp: 98.4 °F (36.9 °C)   TempSrc: Oral   SpO2: 97%   Weight: 301 lb (136.5 kg)   Height: 5' 10\" (1.778 m)       Current Outpatient Medications   Medication Sig    warfarin (COUMADIN) 2 mg tablet 1.5 tabs PO qday    enoxaparin (LOVENOX) injection 140 mg by SubCUTAneous route daily.  CARVEDILOL PO Take 37.5 mg by mouth two (2) times daily (with meals).  nitroglycerin (NITROSTAT) 0.4 mg SL tablet 0.4 mg by SubLINGual route every five (5) minutes as needed for Chest Pain. Up to 3 doses.  Oxygen O2 2L NC 24/7    allopurinol (ZYLOPRIM) 100 mg tablet TAKE 1 TABLET BY MOUTH EVERY DAY (Patient taking differently: TAKE 1 TABLET BY MOUTH EVERY DAY       Every morning before breakfast)    isosorbide mononitrate ER (IMDUR) 120 mg CR tablet TAKE 1 TABLET BY MOUTH EVERY DAY (Patient taking differently: Take 120 mg by mouth Daily (before breakfast). TAKE 1 TABLET BY MOUTH EVERY DAY)    pantoprazole (PROTONIX) 40 mg tablet TAKE 1 TABLET BY MOUTH TWICE A DAY FOR HEARTBURN (Patient taking differently: Take 40 mg by mouth ACB/HS. TAKE 1 TABLET BY MOUTH TWICE A DAY FOR HEARTBURN)    amLODIPine (NORVASC) 10 mg tablet Take 10 mg by mouth Daily (before breakfast).     clopidogrel (PLAVIX) 75 mg tab Take 1 Tab by mouth daily. (Patient taking differently: Take 75 mg by mouth Daily (before breakfast). )    diclofenac (VOLTAREN) 1 % gel Apply 2 g to affected area four (4) times daily. As needed for right knee pain    insulin aspart protamine/insulin aspart (NOVOLOG MIX 70-30 U-100 INSULN) 100 unit/mL (70-30) injection 10-40 Units by SubCUTAneous route nightly as needed (BG > 160). Sliding Scale    albuterol (PROAIR HFA) 90 mcg/actuation inhaler Take 2 Puffs by inhalation every four (4) hours as needed for Wheezing.  ergocalciferol (VITAMIN D2) 50,000 unit capsule Take 50,000 Units by mouth every Tuesday.  atorvastatin (LIPITOR) 80 mg tablet Take 80 mg by mouth nightly.  fluticasone furoate-vilanterol (BREO ELLIPTA) 100-25 mcg/dose inhaler Take 1 Puff by inhalation daily. (Patient not taking: Reported on 6/18/2019)    mupirocin (BACTROBAN) 2 % ointment Apply  to affected area four (4) times daily. (Patient taking differently: Apply  to affected area four (4) times daily. Boil vaginal area)     No current facility-administered medications for this visit. Physical Examination: General appearance - alert, well appearing, and in no distress  Chest - clear to auscultation, no wheezes, rales or rhonchi, symmetric air entry  Heart - normal rate, regular rhythm, normal S1, S2, no murmurs, rubs, clicks or gallops      Assessment/ Plan:   Diagnoses and all orders for this visit:    1. Encounter for monitoring Coumadin therapy  -     AMB POC PT/INR    2. Warfarin anticoagulation  -     AMB POC PT/INR    3. Other pulmonary embolism without acute cor pulmonale, unspecified chronicity (HCC)  -     warfarin (COUMADIN) 2 mg tablet; 1.5 tabs PO qday     skip today and tomorrow and restart 1.5 tabs daily recheck 1 week. Follow-up and Dispositions    · Return in about 1 week (around 6/25/2019), or if symptoms worsen or fail to improve.            I have discussed the diagnosis with the patient and the intended plan as seen in the above orders. The patient has received an after-visit summary and questions were answered concerning future plans. Pt conveyed understanding of plan.     Medication Side Effects and Warnings were discussed with patient      Summer Arevalo, DO

## 2019-06-18 NOTE — PROGRESS NOTES
Shirley Garcia is a 66 y.o. female    Chief Complaint   Patient presents with    Coagulation disorder     INR check     1. Have you been to the ER, urgent care clinic since your last visit? Hospitalized since your last visit? No  2. Have you seen or consulted any other health care providers outside of the [address] [place] [address] since your last visit? Include any pap smears or colon screening.  No    Visit Vitals  /63 (BP 1 Location: Right arm, BP Patient Position: Sitting)   Pulse 64   Temp 98.4 °F (36.9 °C) (Oral)   Resp 14   Ht 5' 10\" (1.778 m)   Wt 301 lb (136.5 kg)   SpO2 97%   BMI 43.19 kg/m²

## 2019-06-18 NOTE — PATIENT INSTRUCTIONS
A Healthy Lifestyle: Care Instructions  Your Care Instructions    A healthy lifestyle can help you feel good, stay at a healthy weight, and have plenty of energy for both work and play. A healthy lifestyle is something you can share with your whole family. A healthy lifestyle also can lower your risk for serious health problems, such as high blood pressure, heart disease, and diabetes. You can follow a few steps listed below to improve your health and the health of your family. Follow-up care is a key part of your treatment and safety. Be sure to make and go to all appointments, and call your doctor if you are having problems. It's also a good idea to know your test results and keep a list of the medicines you take. How can you care for yourself at home? · Do not eat too much sugar, fat, or fast foods. You can still have dessert and treats now and then. The goal is moderation. · Start small to improve your eating habits. Pay attention to portion sizes, drink less juice and soda pop, and eat more fruits and vegetables. ? Eat a healthy amount of food. A 3-ounce serving of meat, for example, is about the size of a deck of cards. Fill the rest of your plate with vegetables and whole grains. ? Limit the amount of soda and sports drinks you have every day. Drink more water when you are thirsty. ? Eat at least 5 servings of fruits and vegetables every day. It may seem like a lot, but it is not hard to reach this goal. A serving or helping is 1 piece of fruit, 1 cup of vegetables, or 2 cups of leafy, raw vegetables. Have an apple or some carrot sticks as an afternoon snack instead of a candy bar. Try to have fruits and/or vegetables at every meal.  · Make exercise part of your daily routine. You may want to start with simple activities, such as walking, bicycling, or slow swimming. Try to be active 30 to 60 minutes every day. You do not need to do all 30 to 60 minutes all at once.  For example, you can exercise 3 times a day for 10 or 20 minutes. Moderate exercise is safe for most people, but it is always a good idea to talk to your doctor before starting an exercise program.  · Keep moving. Farideh Bonds the lawn, work in the garden, or [employer]. Take the stairs instead of the elevator at work. · If you smoke, quit. People who smoke have an increased risk for heart attack, stroke, cancer, and other lung illnesses. Quitting is hard, but there are ways to boost your chance of quitting tobacco for good. ? Use nicotine gum, patches, or lozenges. ? Ask your doctor about stop-smoking programs and medicines. ? Keep trying. In addition to reducing your risk of diseases in the future, you will notice some benefits soon after you stop using tobacco. If you have shortness of breath or asthma symptoms, they will likely get better within a few weeks after you quit. · Limit how much alcohol you drink. Moderate amounts of alcohol (up to 2 drinks a day for men, 1 drink a day for women) are okay. But drinking too much can lead to liver problems, high blood pressure, and other health problems. Family health  If you have a family, there are many things you can do together to improve your health. · Eat meals together as a family as often as possible. · Eat healthy foods. This includes fruits, vegetables, lean meats and dairy, and whole grains. · Include your family in your fitness plan. Most people think of activities such as jogging or tennis as the way to fitness, but there are many ways you and your family can be more active. Anything that makes you breathe hard and gets your heart pumping is exercise. Here are some tips:  ? Walk to do errands or to take your child to school or the bus.  ? Go for a family bike ride after dinner instead of watching TV. Where can you learn more? Go to http://lindsay-kai.info/. Enter B661 in the search box to learn more about \"A Healthy Lifestyle: Care Instructions. \"  Current as of: September 11, 2018  Content Version: 11.9  © 0320-5837 [employer]. Care instructions adapted under license by [employer] (which disclaims liability or warranty for this information). If you have questions about a medical condition or this instruction, always ask your healthcare professional. [address] any warranty or liability for your use of this information.

## 2019-06-21 ENCOUNTER — OFFICE VISIT (OUTPATIENT)
Dept: CARDIOLOGY CLINIC | Age: 64
End: 2019-06-21

## 2019-06-21 VITALS
HEART RATE: 80 BPM | DIASTOLIC BLOOD PRESSURE: 60 MMHG | HEIGHT: 70 IN | BODY MASS INDEX: 43.19 KG/M2 | SYSTOLIC BLOOD PRESSURE: 142 MMHG | RESPIRATION RATE: 18 BRPM

## 2019-06-21 DIAGNOSIS — J44.9 COPD, SEVERE (HCC): ICD-10-CM

## 2019-06-21 DIAGNOSIS — Z99.81 O2 DEPENDENT: ICD-10-CM

## 2019-06-21 DIAGNOSIS — Z99.89 OSA ON CPAP: ICD-10-CM

## 2019-06-21 DIAGNOSIS — Z86.72 HX OF DEEP VEIN THROMBOPHLEBITIS OF LOWER EXTREMITY: ICD-10-CM

## 2019-06-21 DIAGNOSIS — G47.33 OSA ON CPAP: ICD-10-CM

## 2019-06-21 DIAGNOSIS — I50.32 CHRONIC HEART FAILURE WITH PRESERVED EJECTION FRACTION (HCC): ICD-10-CM

## 2019-06-21 DIAGNOSIS — I25.10 CORONARY ARTERY DISEASE INVOLVING NATIVE CORONARY ARTERY OF NATIVE HEART WITHOUT ANGINA PECTORIS: Primary | ICD-10-CM

## 2019-06-21 DIAGNOSIS — I49.3 PVC (PREMATURE VENTRICULAR CONTRACTION): ICD-10-CM

## 2019-06-21 DIAGNOSIS — Z79.01 CHRONIC ANTICOAGULATION: ICD-10-CM

## 2019-06-21 DIAGNOSIS — E66.01 MORBID OBESITY (HCC): ICD-10-CM

## 2019-06-21 DIAGNOSIS — Z99.2 ESRD ON HEMODIALYSIS (HCC): ICD-10-CM

## 2019-06-21 DIAGNOSIS — D64.9 ANEMIA, UNSPECIFIED TYPE: ICD-10-CM

## 2019-06-21 DIAGNOSIS — J84.9 ILD (INTERSTITIAL LUNG DISEASE) (HCC): ICD-10-CM

## 2019-06-21 DIAGNOSIS — N18.6 ESRD ON HEMODIALYSIS (HCC): ICD-10-CM

## 2019-06-21 NOTE — PROGRESS NOTES
[address]  [address],  [address]  [place], [address] [facility]    Office [phone]  Fax [phone]  Cell [phone]    Marlo Cross is a 66 y.o. female. Last seen by me 6 months ago. She underwent ORIF right malleolar fracture 5/24/19. Assessment  Encounter Diagnoses   Name Primary?  O2 dependent     JASEN on CPAP     Coronary artery disease involving native coronary artery of native heart without angina pectoris Yes    PVC (premature ventricular contraction)     Anemia, unspecified type     Morbid obesity (HCC)     Chronic anticoagulation     Hx of deep vein thrombophlebitis of lower extremity     ESRD on hemodialysis (HCC)     Chronic heart failure with preserved ejection fraction (HCC)     COPD, severe (HCC)     ILD (interstitial lung disease) ([facility]      Recommendations:    1. HFpEF, class 3 with moderate to severe PA HTN, s/p cardioMEMS. She has not had any recent readings. Volume is being managed by dialysis. She is no longer on diuretic therapy. I encouraged her to get some PA readings in the near future. 2. Chronic lung disease, oxygen dependent. She is adherent with CPAP. 3. CAD, s/p PCI RCA  1 month ago. She has had no significant impact on her symptom status. I suspect she has microvascular dysfunction as well. The key is keeping her filling pressures down. Continue oral nitrates. Continue Plavix plus Warfarin (VTE). 4. Symptomatic PVCs. Recent 48 hour holter without AF. I reassured her these were benign. She is already on a good dose of carvedilol. 5. Chronic anemia due to ESRD procrit resistant    6. T2DM managed by Ameya Mederos DO    7. Morbid Obesity    8. Chronic anticoagulation due to previous DVT. Continue Coumadin. INR being followed by Dr. Aida Coronel. 9. Residual foreign body in her left lung, possibly due to tip from previous dialysis catheter. I reassured her it was unlikely to migrate.  Would not pursue retrieval at this time. Follow-up and Dispositions    · Return in about 6 months (around 12/21/2019). Patient Active Problem List    Diagnosis    CAD (coronary Artery Disease) Native Artery     [place] 2004  1v CAD by cath - PCI OM with SAL  Cath 5/2004 - patent stent, no new disease  Lexiscan cardiolite 12/09- normal perfusion, EF 66%  Cath: 3/19/12: PA 55/30 mean 41, wedge 26, RA 24, EDP 35, LVG 55%, LM normal, LAD normal, LCX - OM1 patent stent, OM2 prox 85% long, % with L to R collaterals         ESRD (end stage renal disease) ([facility]    S/P ORIF (open reduction internal fixation) fracture    Nephrolithiasis    Symptomatic PVCs    (HFpEF) heart failure with preserved ejection fraction ([address]    ESRD on hemodialysis ([facility])    Limited mobility    Hx of deep venous thrombosis    Diabetic gastroparesis ([facility])    GERD (gastroesophageal reflux disease)    Rheumatoid arteritis    DM type 2 causing neurological disease ([address]    DM type 2 causing renal disease ([address]    DM type 2 causing vascular disease ([address]    Gout    ILD (interstitial lung disease) ([address]    Warfarin anticoagulation    COPD, severe ([address]    DM (diabetes mellitus), type 2 with renal complications ([facility])    Normocytic anemia    HTN (hypertension)    Morbid obesity    Gastroparesis    Pulmonary HTN    JASEN on CPAP     Dr. Sudhir Joshi CPAP      Sleep apnea        Subjective:    Nadiya Dhillon reports she is doing well overall. Her ankle fracture resulted from her knee giving out,     Two weeks ago she was hospitalized at [facility] due to worsening dyspnea on dialysis. She was found to have an foreign body by CT in her left lung. It was thought to be the tip of her dialysis catheter, which was previously removed. Conservative management was thought to be best. Followed by Dr. Lashell Moser, pulmonology. She still complains of random episodes of chest pain. She has chronic LARA, class 3+. She wears O2 consistently.  She is in a wheelchair recovering from her ankle fracture and she is not active at this time. Patient denies any syncope, orthopnea, edema or paroxysmal nocturnal dyspnea. She has significant right knee pain. Cardiac risk factors   HTN yes  DM yes    Cardiac testing  CATH: 2004: 1v CAD by cath - PCI OM with SAL  CATH 5/2004 - patent stent, no new disease   Lexiscan cardiolite 12/09- normal perfusion, EF 66%  ECHO 11/2009: LVH, EF 96%, diastolic dysfunction  STRESS/LEXISCAN/CARDIOLITE 3/29/11: normal perfusion, EF 62%  CATH: 3/20/2012 :PA 55/30 mean 41, wedge 26, RA 24, EDP 35, LVG 55%, LM normal, LAD normal, LCX - OM1 patent stent, OM2 prox 85% long, % w/ L -> R collateral; s/p SAL-> OM2/OM3 with SAL. CATH: 10/4/2012: EDP 25, EF 55%, LM nl, LAD mild plaque, LCX patent OM stents, % prox with good L to R collaterals. Cath 3/18/2014 - RA 9 PA 42/19/29, PCW 12, EDP 25, no LVG due to CKD, LM nl, LAD mild plaque, LCX patent stents OM1/OM2, RCA chronic proximal occlusion with L to R collaterals. ECHO 4/11/16: EF 60-65%. No WMA. LA mildly dilated. Lexiscan Cardiolite 4/11/16: Normal. LVEF 55%. Compared to 3/29/11, no significant changes. RHC/CardioMEMS implant 8/31/17 - RA 12, PA 56/20/30, CI (Mayte) 2.65    Echo 11/30/17 - EF 65%. G1DD. No WMA. Wall thickness was mildly to moderately increased. Limited Echo 12/13/17 - Tricuspid valve: Pulmonary artery diastolic pressure: 98.62 mmHg. [address] 6/5/18 Moderate-severe PA HTN, post capillary  Marked elevation in biV filling pressures    Event Monitor in 8/2018 demonstrated frequent PVCs    48 hour Holter monitor 2/5/19 - SB to ST 53 to 109, average HR 73. No AF/flutter. Echo 2/22/19 - EF 66-70%    Past Medical History:   Diagnosis Date     Sleep Apnea 2/16/2011    Compliant with c-pap.     Aortic aneurysm (HCC)     Abdominal    CAD (coronary Artery Disease) Native Artery 2/16/2011    Chronic kidney disease     Hemodiaylsis  3x/week    CKD (chronic kidney disease) stage 4, GFR 15-29 ml/min ([facility]) 2/10/2017    Coagulation disorder ([facility] 06/2018    Anemia  Last blood Transfusion    COPD (chronic obstructive pulmonary disease) ([facility])     Diabetic gastroparesis ([facility])     Diastolic heart failure ([facility] [facility] 10/5/2012    DM type 2 causing neurological disease ([facility]     DM type 2 causing renal disease ([facility])     Insulin SS at night only PRN    DM type 2 causing vascular disease ([facility] [facility]     Esophageal stricture 2012    dialted Dr. Audrey Hernandez [id number] deficiency [facility])     trait    GERD (gastroesophageal reflux disease)     Gout     Hemodialysis access, AV graft ([facility] [facility] 04/02/2017    Dialysis MWF    [address]  Hypertension     ILD (interstitial lung disease) ([facility] [facility]     open lung bx CJW 2010    Morbid obesity ([facility] [facility]     OA (osteoarthritis)     Ovarian cancer ([facility] [facility]     cervical and uterine    Rheumatoid arteritis     S/P left pulmonary artery pressure sensor implant placement 8/31/2017    Cardiomems     Thromboembolus ([facility] [facility]     Right calf     Tobacco use disorder 3/21/2012    Uterine cervix cancer ([facility])     Vitamin D deficiency 8/9/2013        Current Outpatient Medications   Medication Sig Dispense Refill    warfarin (COUMADIN) 2 mg tablet 1.5 tabs PO qday 45 Tab 0    fluticasone furoate-vilanterol (BREO ELLIPTA) 100-25 mcg/dose inhaler Take 1 Puff by inhalation daily. 1 Inhaler 5    CARVEDILOL PO Take 37.5 mg by mouth two (2) times daily (with meals).  nitroglycerin (NITROSTAT) 0.4 mg SL tablet 0.4 mg by SubLINGual route every five (5) minutes as needed for Chest Pain. Up to 3 doses.       Oxygen O2 2L NC 24/7      allopurinol (ZYLOPRIM) 100 mg tablet TAKE 1 TABLET BY MOUTH EVERY DAY 30 Tab 4    isosorbide mononitrate ER (IMDUR) 120 mg CR tablet TAKE 1 TABLET BY MOUTH EVERY DAY 90 Tab 3    pantoprazole (PROTONIX) 40 mg tablet TAKE 1 TABLET BY MOUTH TWICE A DAY FOR HEARTBURN 180 Tab 3    amLODIPine (NORVASC) 10 mg tablet Take 10 mg by mouth Daily (before breakfast).  clopidogrel (PLAVIX) 75 mg tab Take 1 Tab by mouth daily. 30 Tab 11    diclofenac (VOLTAREN) 1 % gel Apply 2 g to affected area four (4) times daily. As needed for right knee pain 100 g 5    albuterol (PROAIR HFA) 90 mcg/actuation inhaler Take 2 Puffs by inhalation every four (4) hours as needed for Wheezing.  ergocalciferol (VITAMIN D2) 50,000 unit capsule Take 50,000 Units by mouth every Tuesday.  atorvastatin (LIPITOR) 80 mg tablet Take 80 mg by mouth nightly.  enoxaparin (LOVENOX) injection 140 mg by SubCUTAneous route daily. 14 Syringe 0    mupirocin (BACTROBAN) 2 % ointment Apply  to affected area four (4) times daily. 22 g 1    insulin aspart protamine/insulin aspart (NOVOLOG MIX 70-30 U-100 INSULN) 100 unit/mL (70-30) injection 10-40 Units by SubCUTAneous route nightly as needed (BG > 160). Sliding Scale         Allergies   Allergen Reactions    Contrast Dye [Iodine] Anaphylaxis     Tolerates when pre medicated w/ IV solumedrol and benadryl prior to procedure     Levaquin [Levofloxacin] Nausea and Vomiting    Morphine Hives and Itching      Review of Systems  Constitutional: Negative for fever, chills, malaise/fatigue and diaphoresis. Respiratory: Negative for cough, hemoptysis, sputum production, and wheezing. +LARA  Cardiovascular: Negative for orthopnea, claudication, leg swelling and PND. +exertional chest pain/pressure, palpitations  Gastrointestinal: Negative for heartburn, nausea, vomiting, blood in stool and melena. Genitourinary: Negative for dysuria and flank pain. +bilateral back pain  Musculoskeletal: Negative for joint pain and back pain. +right knee pain  Skin: Negative for rash. Neurological: Negative for focal weakness, seizures, loss of consciousness, weakness and headaches. Endo/Heme/Allergies: Does not bruise/bleed easily. Psychiatric/Behavioral: Negative for memory loss. The patient does not have insomnia.     Physical Exam:  Visit Vitals  /60 (BP 1 Location: Right arm)   Pulse 80   Resp 18   Ht 5' 10\" (1.778 m)   BMI 43.19 kg/m²     Wt Readings from Last 3 Encounters:   06/18/19 301 lb (136.5 kg)   06/13/19 301 lb (136.5 kg)   05/25/19 309 lb 4.9 oz (140.3 kg)      General - well developed well nourished, morbidly obese BF  Neck - JVP difficult to assess, thyroid nl  Cardiac - normal S1, S2, no murmurs, rubs or gallops. No clicks  Vascular - carotids without bruits, radials, femorals and pedal pulses equal bilateral  Lungs - clear to auscultation bilaterals, no rales, wheezing or rhonchi  Abd - soft nontender, no HSM, no abd bruits  Extremities - 1-2+ pedal edema and pre-tibial edema. Skin - no rash  Neuro - nonfocal  Psych - normal mood and affect    Cardiographics  Event Monitor in 8/2018 demonstrated frequent PVCs  Echo 2/22/19 - EF 66-70%    Written by Seema Regan, as dictated by Cherelle Muñoz M.D.      Cherelle Muñoz MD

## 2019-06-21 NOTE — PROGRESS NOTES
Visit Vitals  /60 (BP 1 Location: Right arm)   Pulse 80   Resp 18   Ht 5' 10\" (1.778 m)   BMI 43.19 kg/m²       Patient is here for follow up. Recently broke her ankle. Recently in [place] because she could not breathe. aching

## 2019-06-24 RX ORDER — CARVEDILOL 25 MG/1
TABLET ORAL
Qty: 270 TAB | Refills: 0 | Status: SHIPPED | OUTPATIENT
Start: 2019-06-24 | End: 2019-08-05 | Stop reason: SDUPTHER

## 2019-06-28 ENCOUNTER — OFFICE VISIT (OUTPATIENT)
Dept: FAMILY MEDICINE CLINIC | Age: 64
End: 2019-06-28

## 2019-06-28 ENCOUNTER — PATIENT OUTREACH (OUTPATIENT)
Dept: FAMILY MEDICINE CLINIC | Age: 64
End: 2019-06-28

## 2019-06-28 VITALS
BODY MASS INDEX: 43.19 KG/M2 | HEIGHT: 70 IN | DIASTOLIC BLOOD PRESSURE: 65 MMHG | TEMPERATURE: 98.8 F | SYSTOLIC BLOOD PRESSURE: 126 MMHG | OXYGEN SATURATION: 96 % | HEART RATE: 82 BPM | RESPIRATION RATE: 12 BRPM

## 2019-06-28 DIAGNOSIS — R07.9 CHRONIC CHEST PAIN: Primary | ICD-10-CM

## 2019-06-28 DIAGNOSIS — G89.29 CHRONIC CHEST PAIN: Primary | ICD-10-CM

## 2019-06-28 DIAGNOSIS — I20.9 ANGINA, CLASS III (HCC): ICD-10-CM

## 2019-06-28 DIAGNOSIS — D68.9 COAGULATION DEFECT (HCC): ICD-10-CM

## 2019-06-28 LAB
INR BLD: 2.2
PT POC: NORMAL SECONDS
VALID INTERNAL CONTROL?: YES

## 2019-06-28 RX ORDER — OXYCODONE AND ACETAMINOPHEN 7.5; 325 MG/1; MG/1
1 TABLET ORAL
Qty: 90 TAB | Refills: 0 | Status: SHIPPED | OUTPATIENT
Start: 2019-06-28 | End: 2019-09-03 | Stop reason: SDUPTHER

## 2019-06-28 NOTE — PROGRESS NOTES
Edgard Finney is a 66 y.o. female , id x 2(name and ). Reviewed record, history, and  medications. Chief Complaint   Patient presents with    Coagulation disorder     inr check        Vitals:    19 1603   BP: 126/65   Pulse: 82   Resp: 12   Temp: 98.8 °F (37.1 °C)   SpO2: 96%   Height: 5' 10\" (1.778 m)   PainSc:   8   PainLoc: Generalized     Results for orders placed or performed in visit on 19   AMB POC PT/INR   Result Value Ref Range    VALID INTERNAL CONTROL POC Yes     Prothrombin time (POC)  seconds    INR POC 2.2           Coordination of Care Questionnaire:   1) Have you been to an emergency room, urgent care, or hospitalized since your last visit?   no       2. Have seen or consulted any other health care provider since your last visit? NO    3) Do you have an Advanced Directive/ Living Will in place? NO  If yes, do we have a copy on file NO  If no, would you like information NO    3 most recent PHQ Screens 2019   Little interest or pleasure in doing things Not at all   Feeling down, depressed, irritable, or hopeless Not at all   Total Score PHQ 2 0       Patient is accompanied by  I have received verbal consent from Edgard Finney to discuss any/all medical information while they are present in the room.

## 2019-06-28 NOTE — PROGRESS NOTES
Elian Carreno is a 66 y.o. female   Chief Complaint   Patient presents with    Coagulation disorder     inr check     Pt here for refill of her pain medication for her chronic chest pain. Her pain has been being managed by orthopedics since she is post op but states the 10mg oxycodone are too strong and are making her high. Pt states what I was giving her previous to surgery was working much better. Pt will discard her current narcotics and we did discuss how jovany safely discard these. Medication allows pt to do some minimal house chores but she is severely limited due to her many debilitating medical issues. No hx of abuse. INR goal c/w current therapy recheck 3 weeks. Taking 1.5 tabs daily. Opioid/Pain Management:    1. Has the patient signed a pain contract for chronic narcotic use? yes  2. Has the patient had a UDS or Serum screen as per guidelines as per [facility]?:   yes    3. Does patient meet necessary guidelines for Naloxone treatement per the [facility]?:    no  4. Has the Prescription Monitoring Program been reviewed? yes  5. Does patient have a long term condition that requires long term use of a Narcotic?  yes  6. Has patient been tolerant of therapy and responsible to routine follow up and specialist follow-up? Yes      she is a 66y.o. year old female who presents for evalution. Reviewed PmHx, RxHx, FmHx, SocHx, AllgHx and updated and dated in the chart. Review of Systems - negative except as listed above in the HPI    Objective:     Vitals:    06/28/19 1603   BP: 126/65   Pulse: 82   Resp: 12   Temp: 98.8 °F (37.1 °C)   SpO2: 96%   Height: 5' 10\" (1.778 m)       Current Outpatient Medications   Medication Sig    oxyCODONE-acetaminophen (PERCOCET 7.5) 7.5-325 mg per tablet Take 1 Tab by mouth every eight (8) hours as needed for Pain for up to 30 days. Max Daily Amount: 3 Tabs.     carvedilol (COREG) 25 mg tablet TAKE 1 AND 1/2 TABLET BY MOUTH TWO TIMES DAILY WITH MEALS    warfarin (COUMADIN) 2 mg tablet 1.5 tabs PO qday    nitroglycerin (NITROSTAT) 0.4 mg SL tablet 0.4 mg by SubLINGual route every five (5) minutes as needed for Chest Pain. Up to 3 doses.  Oxygen O2 2L NC 24/7    allopurinol (ZYLOPRIM) 100 mg tablet TAKE 1 TABLET BY MOUTH EVERY DAY    isosorbide mononitrate ER (IMDUR) 120 mg CR tablet TAKE 1 TABLET BY MOUTH EVERY DAY    pantoprazole (PROTONIX) 40 mg tablet TAKE 1 TABLET BY MOUTH TWICE A DAY FOR HEARTBURN    amLODIPine (NORVASC) 10 mg tablet Take 10 mg by mouth Daily (before breakfast).  clopidogrel (PLAVIX) 75 mg tab Take 1 Tab by mouth daily.  ergocalciferol (VITAMIN D2) 50,000 unit capsule Take 50,000 Units by mouth every Tuesday.  atorvastatin (LIPITOR) 80 mg tablet Take 80 mg by mouth nightly.  fluticasone furoate-vilanterol (BREO ELLIPTA) 100-25 mcg/dose inhaler Take 1 Puff by inhalation daily.  enoxaparin (LOVENOX) injection 140 mg by SubCUTAneous route daily.  mupirocin (BACTROBAN) 2 % ointment Apply  to affected area four (4) times daily.  diclofenac (VOLTAREN) 1 % gel Apply 2 g to affected area four (4) times daily. As needed for right knee pain (Patient not taking: Reported on 6/28/2019)    insulin aspart protamine/insulin aspart (NOVOLOG MIX 70-30 U-100 INSULN) 100 unit/mL (70-30) injection 10-40 Units by SubCUTAneous route nightly as needed (BG > 160). Sliding Scale    albuterol (PROAIR HFA) 90 mcg/actuation inhaler Take 2 Puffs by inhalation every four (4) hours as needed for Wheezing. No current facility-administered medications for this visit. Physical Examination: General appearance - alert, well appearing, and in no distress  Chest - clear to auscultation, no wheezes, rales or rhonchi, symmetric air entry  Heart - normal rate, regular rhythm, normal S1, S2, no murmurs, rubs, clicks or gallops      Assessment/ Plan:   Diagnoses and all orders for this visit:    1.  Chronic chest pain  -     oxyCODONE-acetaminophen (PERCOCET 7.5) 7.5-325 mg per tablet; Take 1 Tab by mouth every eight (8) hours as needed for Pain for up to 30 days. Max Daily Amount: 3 Tabs. No change in therapy indicated except decrease to 7/5 from current 10mg due to sedation  2. Coagulation defect (HCC)  -     AMB POC PT/INR  @ goal at 2.2 INR today c/w current therapy recheck 3 weeks  3. Angina, class III (HCC)  -     oxyCODONE-acetaminophen (PERCOCET 7.5) 7.5-325 mg per tablet; Take 1 Tab by mouth every eight (8) hours as needed for Pain for up to 30 days. Max Daily Amount: 3 Tabs. Follow-up and Dispositions    · Return in about 3 weeks (around 7/19/2019), or if symptoms worsen or fail to improve. I have discussed the diagnosis with the patient and the intended plan as seen in the above orders. The patient has received an after-visit summary and questions were answered concerning future plans. Pt conveyed understanding of plan.     Medication Side Effects and Warnings were discussed with patient      Philippe Brown DO

## 2019-06-28 NOTE — PATIENT INSTRUCTIONS
A Healthy Lifestyle: Care Instructions Your Care Instructions A healthy lifestyle can help you feel good, stay at a healthy weight, and have plenty of energy for both work and play. A healthy lifestyle is something you can share with your whole family. A healthy lifestyle also can lower your risk for serious health problems, such as high blood pressure, heart disease, and diabetes. You can follow a few steps listed below to improve your health and the health of your family. Follow-up care is a key part of your treatment and safety. Be sure to make and go to all appointments, and call your doctor if you are having problems. It's also a good idea to know your test results and keep a list of the medicines you take. How can you care for yourself at home? · Do not eat too much sugar, fat, or fast foods. You can still have dessert and treats now and then. The goal is moderation. · Start small to improve your eating habits. Pay attention to portion sizes, drink less juice and soda pop, and eat more fruits and vegetables. ? Eat a healthy amount of food. A 3-ounce serving of meat, for example, is about the size of a deck of cards. Fill the rest of your plate with vegetables and whole grains. ? Limit the amount of soda and sports drinks you have every day. Drink more water when you are thirsty. ? Eat at least 5 servings of fruits and vegetables every day. It may seem like a lot, but it is not hard to reach this goal. A serving or helping is 1 piece of fruit, 1 cup of vegetables, or 2 cups of leafy, raw vegetables. Have an apple or some carrot sticks as an afternoon snack instead of a candy bar. Try to have fruits and/or vegetables at every meal. 
· Make exercise part of your daily routine. You may want to start with simple activities, such as walking, bicycling, or slow swimming. Try to be active 30 to 60 minutes every day.  You do not need to do all 30 to 60 minutes all at once. For example, you can exercise 3 times a day for 10 or 20 minutes. Moderate exercise is safe for most people, but it is always a good idea to talk to your doctor before starting an exercise program. 
· Keep moving. Cheri Chapa the lawn, work in the garden, or [employer]. Take the stairs instead of the elevator at work. · If you smoke, quit. People who smoke have an increased risk for heart attack, stroke, cancer, and other lung illnesses. Quitting is hard, but there are ways to boost your chance of quitting tobacco for good. ? Use nicotine gum, patches, or lozenges. ? Ask your doctor about stop-smoking programs and medicines. ? Keep trying. In addition to reducing your risk of diseases in the future, you will notice some benefits soon after you stop using tobacco. If you have shortness of breath or asthma symptoms, they will likely get better within a few weeks after you quit. · Limit how much alcohol you drink. Moderate amounts of alcohol (up to 2 drinks a day for men, 1 drink a day for women) are okay. But drinking too much can lead to liver problems, high blood pressure, and other health problems. Family health If you have a family, there are many things you can do together to improve your health. · Eat meals together as a family as often as possible. · Eat healthy foods. This includes fruits, vegetables, lean meats and dairy, and whole grains. · Include your family in your fitness plan. Most people think of activities such as jogging or tennis as the way to fitness, but there are many ways you and your family can be more active. Anything that makes you breathe hard and gets your heart pumping is exercise. Here are some tips: 
? Walk to do errands or to take your child to school or the bus. 
? Go for a family bike ride after dinner instead of watching TV. Where can you learn more? Go to http://lindsay-kai.info/. Enter J006 in the search box to learn more about \"A Healthy Lifestyle: Care Instructions. \" Current as of: September 11, 2018 Content Version: 11.9 © 5575-5019 [employer] [place] [employer]. Care instructions adapted under license by [employer] (which disclaims liability or warranty for this information). If you have questions about a medical condition or this instruction, always ask your healthcare professional. [place] [address] any warranty or liability for your use of this information.

## 2019-06-28 NOTE — PROGRESS NOTES
Patient has graduated from the Transitions of Care Coordination  program on 06/28/19. Patient's symptoms are stable at this time. Patient/family has the ability to self-manage. Care management goals have been completed at this time. No further nurse navigator follow up scheduled. Goals Addressed                 This Visit's Progress     COMPLETED: Attends follow-up appointments as directed. 05/30/19 F/u appt with Dr. Maria Del Carmen Renner 5/31. Nephrology appts at dialysis. To schedule f/u with Dr. Crystal Rodriguez. Will follow up in two weeks. -APM    06/28/19 attended all f/u appts including PCP and cardiology. Resolving episode. -APM       COMPLETED: Returns to baseline activity level. 05/30/19 Patient non weight bearing on R foot. Using wheelchair and BSC. Able to get around house with family help. Has rollator at home. Working with [address] [place] [address] Roman Giron [facility] beginning today. Will follow up in two weeks. -APM    06/28/19 Patient has not returned NN calls or outreach attempts. Per MD appt notes, patient is doing well. At baseline oxygenation. Resolving episode. -APM       COMPLETED: Understands red flags post discharge. 05/30/19 Patient aware of s/s infection to report. Denies symptoms of infection at this time. -APM    06/28/19 Patient has not returned outreach attempts. Clear from infection at PCP 6/18, cardiology 6/21. NN resolving episode. -APM            Pt has nurse navigator's contact information for any further questions, concerns, or needs.   Patients upcoming visits:    Future Appointments   Date Time Provider Meagan Mcintyre   6/28/2019  4:00 PM DO ANTHONY Medina   12/13/2019  4:00 PM Nikia Banuelos MD [address] [place] [address]

## 2019-07-03 ENCOUNTER — HOSPITAL ENCOUNTER (OUTPATIENT)
Dept: GENERAL RADIOLOGY | Age: 64
Discharge: HOME | End: 2019-07-03
Payer: MEDICARE

## 2019-07-03 DIAGNOSIS — M25.571 RIGHT ANKLE PAIN: ICD-10-CM

## 2019-07-03 PROCEDURE — 73610 X-RAY EXAM OF ANKLE: CPT

## 2019-07-09 ENCOUNTER — TELEPHONE (OUTPATIENT)
Dept: CARDIOLOGY CLINIC | Age: 64
End: 2019-07-09

## 2019-07-09 NOTE — TELEPHONE ENCOUNTER
Patient needs an authorization sent to Dr Yaneth Hunt at [facility] for a knee surgery that is pending scheduling.     Fax [phone] attention to [facility], RN    Phone: [phone]

## 2019-07-10 ENCOUNTER — TELEPHONE (OUTPATIENT)
Dept: CARDIOLOGY CLINIC | Age: 64
End: 2019-07-10

## 2019-07-10 NOTE — PROGRESS NOTES
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 Her cardiac risk would be considered intermediate to high - she has chronic HFpEF but her volume is being managed by dialysis. She also has chronic lung disease which is most limiting. She also has chronic CAD with previous RCA PCI without significant change in chronic chest pain status (mostly at rest). Would proceed with surgery as planned. Can safely stop plavix 5 days preop, resuming post op when safe to do so. 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 Defer the need for bridging anticoagulation to him.

## 2019-07-10 NOTE — TELEPHONE ENCOUNTER
Pt states her  had an appt today and he gave the paperwork to Dr Cherelle Abdalla. pls call pt on # [phone].  thanks

## 2019-07-12 NOTE — TELEPHONE ENCOUNTER
Patient would like a call regarding the paperwork that was dropped off by her  on 7/10. Patient would like to know how long it will take to complete the paperwork and if she can get a call when the paperwork is completed. Patient states that she needs the paperwork completed JUANITA Bolivar     Phone: [phone]

## 2019-07-15 ENCOUNTER — TELEPHONE (OUTPATIENT)
Dept: FAMILY MEDICINE CLINIC | Age: 64
End: 2019-07-15

## 2019-07-15 NOTE — TELEPHONE ENCOUNTER
Roge calling from Dr Huang Person office and states pt having a procedure to remove hardware from knee. They need to speak with someone regarding her Coumadin medication. Please call her back tomorrow at [phone].

## 2019-07-16 NOTE — TELEPHONE ENCOUNTER
Nurse returned call to office. I read her Dr. Ocampo Spine note. She states she will give this information to Dr. Giuliano Conklin and will call back if there are any questions.

## 2019-07-16 NOTE — TELEPHONE ENCOUNTER
She should be bridged with lovenox to surgery. She would require 1mg/kg subq qday or 130mg subq daily and then stop 24 hours before procedure, restart 24 hours after if no bleeding issues and bridged back onto coumadin. Attempted to call myself and left a message on practice nurse  to call office back.

## 2019-07-23 ENCOUNTER — DOCUMENTATION ONLY (OUTPATIENT)
Dept: FAMILY MEDICINE CLINIC | Age: 64
End: 2019-07-23

## 2019-07-24 ENCOUNTER — DOCUMENTATION ONLY (OUTPATIENT)
Dept: FAMILY MEDICINE CLINIC | Age: 64
End: 2019-07-24

## 2019-08-26 ENCOUNTER — TELEPHONE (OUTPATIENT)
Dept: FAMILY MEDICINE CLINIC | Age: 64
End: 2019-08-26

## 2019-08-26 NOTE — TELEPHONE ENCOUNTER
----- Message from [address] sent at 8/26/2019  8:04 AM EDT -----  Regarding: Dr. Rubens Villanueva  Pt is requesting to schedule a f/up from [facility] for knee surgery d/c 08/22/19. Pt is also requesting to have INR checked. Best contact number is [phone].

## 2019-08-26 NOTE — TELEPHONE ENCOUNTER
Returned call to pt and tried to give her appt for Thursday and she refused. She stated to have the nurse call her back because she can always be double booked whenever she calls this office. Explained will send the message to Dr Demetria Jolly when he returns to the office.

## 2019-08-27 ENCOUNTER — TELEPHONE (OUTPATIENT)
Dept: FAMILY MEDICINE CLINIC | Age: 64
End: 2019-08-27

## 2019-08-27 NOTE — TELEPHONE ENCOUNTER
Maeve Hartman from [facility] called and stated that she would like to know if they can get orders for physical therapy. Please call her back at [phone].

## 2019-08-28 DIAGNOSIS — R53.81 PHYSICAL DECONDITIONING: Primary | ICD-10-CM

## 2019-08-29 ENCOUNTER — DOCUMENTATION ONLY (OUTPATIENT)
Dept: FAMILY MEDICINE CLINIC | Age: 64
End: 2019-08-29

## 2019-08-30 ENCOUNTER — DOCUMENTATION ONLY (OUTPATIENT)
Dept: FAMILY MEDICINE CLINIC | Age: 64
End: 2019-08-30

## 2019-09-03 ENCOUNTER — DOCUMENTATION ONLY (OUTPATIENT)
Dept: FAMILY MEDICINE CLINIC | Age: 64
End: 2019-09-03

## 2019-09-03 ENCOUNTER — OFFICE VISIT (OUTPATIENT)
Dept: FAMILY MEDICINE CLINIC | Age: 64
End: 2019-09-03

## 2019-09-03 ENCOUNTER — HOSPITAL ENCOUNTER (OUTPATIENT)
Dept: LAB | Age: 64
Discharge: HOME | End: 2019-09-03
Payer: MEDICARE

## 2019-09-03 VITALS
RESPIRATION RATE: 16 BRPM | SYSTOLIC BLOOD PRESSURE: 134 MMHG | BODY MASS INDEX: 41.95 KG/M2 | HEIGHT: 70 IN | DIASTOLIC BLOOD PRESSURE: 74 MMHG | HEART RATE: 70 BPM | TEMPERATURE: 98.4 F | WEIGHT: 293 LBS | OXYGEN SATURATION: 97 %

## 2019-09-03 DIAGNOSIS — Z51.81 MEDICATION MONITORING ENCOUNTER: ICD-10-CM

## 2019-09-03 DIAGNOSIS — G89.29 CHRONIC CHEST PAIN: ICD-10-CM

## 2019-09-03 DIAGNOSIS — I25.118 ATHEROSCLEROSIS OF NATIVE CORONARY ARTERY OF NATIVE HEART WITH STABLE ANGINA PECTORIS (HCC): Chronic | ICD-10-CM

## 2019-09-03 DIAGNOSIS — T84.59XA INFECTION OF PROSTHETIC KNEE JOINT, INITIAL ENCOUNTER (HCC): ICD-10-CM

## 2019-09-03 DIAGNOSIS — Z96.659 INFECTION OF PROSTHETIC KNEE JOINT, INITIAL ENCOUNTER (HCC): ICD-10-CM

## 2019-09-03 DIAGNOSIS — Z51.81 ENCOUNTER FOR MONITORING COUMADIN THERAPY: Primary | ICD-10-CM

## 2019-09-03 DIAGNOSIS — I26.99 OTHER PULMONARY EMBOLISM WITHOUT ACUTE COR PULMONALE, UNSPECIFIED CHRONICITY (HCC): ICD-10-CM

## 2019-09-03 DIAGNOSIS — Z79.01 ENCOUNTER FOR MONITORING COUMADIN THERAPY: Primary | ICD-10-CM

## 2019-09-03 DIAGNOSIS — I20.9 ANGINA, CLASS III (HCC): ICD-10-CM

## 2019-09-03 DIAGNOSIS — R07.9 CHRONIC CHEST PAIN: ICD-10-CM

## 2019-09-03 LAB
INR BLD: 3.2
PT POC: 38.2 SECONDS
VALID INTERNAL CONTROL?: YES

## 2019-09-03 PROCEDURE — 80365 DRUG SCREENING OXYCODONE: CPT

## 2019-09-03 PROCEDURE — 80307 DRUG TEST PRSMV CHEM ANLYZR: CPT

## 2019-09-03 RX ORDER — METOCLOPRAMIDE 10 MG/1
10 TABLET ORAL
COMMUNITY
End: 2019-12-11 | Stop reason: SDUPTHER

## 2019-09-03 RX ORDER — WARFARIN 2 MG/1
TABLET ORAL
Qty: 45 TAB | Refills: 3 | Status: SHIPPED | OUTPATIENT
Start: 2019-09-03 | End: 2019-09-03

## 2019-09-03 RX ORDER — WARFARIN 2 MG/1
TABLET ORAL
Qty: 45 TAB | Refills: 3 | Status: SHIPPED | OUTPATIENT
Start: 2019-09-03 | End: 2019-09-24 | Stop reason: SDUPTHER

## 2019-09-03 RX ORDER — SUCRALFATE 1 G/1
1 TABLET ORAL 4 TIMES DAILY
COMMUNITY
End: 2019-09-04 | Stop reason: SDUPTHER

## 2019-09-03 RX ORDER — WARFARIN 3 MG/1
3 TABLET ORAL DAILY
Qty: 45 TAB | Refills: 3 | Status: SHIPPED | OUTPATIENT
Start: 2019-09-03 | End: 2019-09-03

## 2019-09-03 RX ORDER — WARFARIN 3 MG/1
TABLET ORAL
Qty: 45 TAB | Refills: 3 | Status: SHIPPED | OUTPATIENT
Start: 2019-09-03 | End: 2019-11-14

## 2019-09-03 RX ORDER — OXYCODONE AND ACETAMINOPHEN 7.5; 325 MG/1; MG/1
1 TABLET ORAL
Qty: 90 TAB | Refills: 0 | Status: SHIPPED | OUTPATIENT
Start: 2019-09-03 | End: 2019-11-14 | Stop reason: SDUPTHER

## 2019-09-03 RX ORDER — ATORVASTATIN CALCIUM 80 MG/1
80 TABLET, FILM COATED ORAL
Qty: 90 TAB | Refills: 3 | Status: SHIPPED | OUTPATIENT
Start: 2019-09-03 | End: 2020-09-09

## 2019-09-03 RX ORDER — WARFARIN 3 MG/1
3 TABLET ORAL DAILY
Qty: 45 TAB | Refills: 3 | Status: SHIPPED | OUTPATIENT
Start: 2019-09-03 | End: 2019-09-03 | Stop reason: SDUPTHER

## 2019-09-03 NOTE — PROGRESS NOTES
Chief Complaint   Patient presents with   [facility] Follow Up    Medication Refill     Percocet     Patient presents in office today for SEE f/u from [facility]. Admitted on 7/23/19. Discharged on 8/23/19. Needs a refill of Percocet. No concerns. 1. Have you been to the ER, urgent care clinic since your last visit? Hospitalized since your last visit? Yes admitted to [facility] on 7/23/19    2. Have you seen or consulted any other health care providers outside of the [address] [place] [address] since your last visit? Include any pap smears or colon screening.  No    Learning Assessment 6/28/2019   PRIMARY LEARNER Patient   PRIMARY LANGUAGE ENGLISH   LEARNER PREFERENCE PRIMARY LISTENING   ANSWERED BY patient    RELATIONSHIP SELF

## 2019-09-03 NOTE — PROGRESS NOTES
Smooth Davidson is a 66 y.o. female   Chief Complaint   Patient presents with   [facility] Follow Up    Medication Refill     Percocet    pt here for follow up from recent hospitalization at [address] [place] [address] with an infected prostheses R knee. Pt had her knee replacement removed and a spacer placed in. Pt was given oxycodone 5mg 20 tabs to leave hospital with and is requesting a refill of her percocet. Mateo brooks was in June. This is originally used for chronic anginal chest pain. Pain medication brings her pain from 7/10 and will bring it to a 4/10 and discussed changing dose and states anything higher than 7.5 makes her too cloudy. Pt shows no signs of abuse and no hx of drug or etoh abuse. Pt is due for drug screen, last oxy was last night. Opioid/Pain Management:    1. Has the patient signed a pain contract for chronic narcotic use? yes  2. Has the patient had a UDS or Serum screen as per guidelines as per [facility]?:   yes    3. Does patient meet necessary guidelines for Naloxone treatement per the [facility]?:    no  4. Has the Prescription Monitoring Program been reviewed? yes  5. Does patient have a long term condition that requires long term use of a Narcotic?  yes  6. Has patient been tolerant of therapy and responsible to routine follow up and specialist follow-up? Yes        Pt had her INR checked last Thursday by [place] nurse. Was 2.4 and will be rechecked today by Manjit Thorpe. she is a 66y.o. year old female who presents for evalution. Reviewed PmHx, RxHx, FmHx, SocHx, AllgHx and updated and dated in the chart.     Review of Systems - negative except as listed above in the HPI    Objective:     Vitals:    09/03/19 0823   BP: 134/74   Pulse: 70   Resp: 16   Temp: 98.4 °F (36.9 °C)   TempSrc: Oral   SpO2: 97%   Weight: 301 lb (136.5 kg)   Height: 5' 10\" (1.778 m)       Current Outpatient Medications   Medication Sig    metoclopramide HCl (REGLAN) 10 mg tablet Take 10 mg by mouth Before breakfast, lunch, dinner and at bedtime.  sucralfate (CARAFATE) 1 gram tablet Take 1 g by mouth four (4) times daily.  atorvastatin (LIPITOR) 80 mg tablet Take 1 Tab by mouth nightly.  oxyCODONE-acetaminophen (PERCOCET 7.5) 7.5-325 mg per tablet Take 1 Tab by mouth every eight (8) hours as needed for Pain for up to 30 days. Max Daily Amount: 3 Tabs.  warfarin (COUMADIN) 2 mg tablet 2mg Wed Thurs Sat Sunday, 3 mg all other days    warfarin (COUMADIN) 3 mg tablet 2mg Wed Thurs Sat Sunday, 3 mg all other days    carvedilol (COREG) 25 mg tablet TAKE 1 AND 1/2 TABLET BY MOUTH TWO TIMES DAILY WITH MEALS    fluticasone furoate-vilanterol (BREO ELLIPTA) 100-25 mcg/dose inhaler Take 1 Puff by inhalation daily.  enoxaparin (LOVENOX) injection 140 mg by SubCUTAneous route daily.  nitroglycerin (NITROSTAT) 0.4 mg SL tablet 0.4 mg by SubLINGual route every five (5) minutes as needed for Chest Pain. Up to 3 doses.  Oxygen O2 2L NC 24/7    mupirocin (BACTROBAN) 2 % ointment Apply  to affected area four (4) times daily.  allopurinol (ZYLOPRIM) 100 mg tablet TAKE 1 TABLET BY MOUTH EVERY DAY    isosorbide mononitrate ER (IMDUR) 120 mg CR tablet TAKE 1 TABLET BY MOUTH EVERY DAY    pantoprazole (PROTONIX) 40 mg tablet TAKE 1 TABLET BY MOUTH TWICE A DAY FOR HEARTBURN    amLODIPine (NORVASC) 10 mg tablet Take 10 mg by mouth Daily (before breakfast).  clopidogrel (PLAVIX) 75 mg tab Take 1 Tab by mouth daily.  insulin aspart protamine/insulin aspart (NOVOLOG MIX 70-30 U-100 INSULN) 100 unit/mL (70-30) injection 10-40 Units by SubCUTAneous route nightly as needed (BG > 160). Sliding Scale    albuterol (PROAIR HFA) 90 mcg/actuation inhaler Take 2 Puffs by inhalation every four (4) hours as needed for Wheezing.  ergocalciferol (VITAMIN D2) 50,000 unit capsule Take 50,000 Units by mouth every Tuesday.  diclofenac (VOLTAREN) 1 % gel Apply 2 g to affected area four (4) times daily.  As needed for right knee pain (Patient not taking: Reported on 6/28/2019)     No current facility-administered medications for this visit. Physical Examination: General appearance - alert, well appearing, and in no distress  Mental status - alert, oriented to person, place, and time  Chest - clear to auscultation, no wheezes, rales or rhonchi, symmetric air entry, port on R ant chest wall  Heart - normal rate, regular rhythm, normal S1, S2, no murmurs, rubs, clicks or gallops      Assessment/ Plan:   Diagnoses and all orders for this visit:    1. Encounter for monitoring Coumadin therapy  -     AMB POC PT/INR    2. Other pulmonary embolism without acute cor pulmonale, unspecified chronicity (HCC)  -     warfarin (COUMADIN) 2 mg tablet; Alternate 2 and 3 mg daily PO  -     warfarin (COUMADIN) 3 mg tablet; Take 1 Tab by mouth daily. Alternate with 2mg    3. Atherosclerosis of native coronary artery of native heart with stable angina pectoris (HCC)  -     atorvastatin (LIPITOR) 80 mg tablet; Take 1 Tab by mouth nightly. 4. Chronic chest pain  -     oxyCODONE-acetaminophen (PERCOCET 7.5) 7.5-325 mg per tablet; Take 1 Tab by mouth every eight (8) hours as needed for Pain for up to 30 days. Max Daily Amount: 3 Tabs. -     DRUG SCREEN 11 W/CONF, SERUM    5. Angina, class III (HCC)  -     oxyCODONE-acetaminophen (PERCOCET 7.5) 7.5-325 mg per tablet; Take 1 Tab by mouth every eight (8) hours as needed for Pain for up to 30 days. Max Daily Amount: 3 Tabs. -     DRUG SCREEN 11 W/CONF, SERUM    6. Medication monitoring encounter  -     oxyCODONE-acetaminophen (PERCOCET 7.5) 7.5-325 mg per tablet; Take 1 Tab by mouth every eight (8) hours as needed for Pain for up to 30 days. Max Daily Amount: 3 Tabs.   -     DRUG SCREEN 11 W/CONF, SERUM    7. [facility], [facility]  On vanco IV at dialysis, pt receives another abx at home thru the port  gives but pt does not recall name Follow-up and Dispositions    · Return in about 4 weeks (around 10/1/2019), or if symptoms worsen or fail to improve. I have discussed the diagnosis with the patient and the intended plan as seen in the above orders. The patient has received an after-visit summary and questions were answered concerning future plans. Pt conveyed understanding of plan.     Medication Side Effects and Warnings were discussed with patient      Graciela Lowe,

## 2019-09-03 NOTE — PATIENT INSTRUCTIONS
A Healthy Lifestyle: Care Instructions  Your Care Instructions    A healthy lifestyle can help you feel good, stay at a healthy weight, and have plenty of energy for both work and play. A healthy lifestyle is something you can share with your whole family. A healthy lifestyle also can lower your risk for serious health problems, such as high blood pressure, heart disease, and diabetes. You can follow a few steps listed below to improve your health and the health of your family. Follow-up care is a key part of your treatment and safety. Be sure to make and go to all appointments, and call your doctor if you are having problems. It's also a good idea to know your test results and keep a list of the medicines you take. How can you care for yourself at home? · Do not eat too much sugar, fat, or fast foods. You can still have dessert and treats now and then. The goal is moderation. · Start small to improve your eating habits. Pay attention to portion sizes, drink less juice and soda pop, and eat more fruits and vegetables. ? Eat a healthy amount of food. A 3-ounce serving of meat, for example, is about the size of a deck of cards. Fill the rest of your plate with vegetables and whole grains. ? Limit the amount of soda and sports drinks you have every day. Drink more water when you are thirsty. ? Eat at least 5 servings of fruits and vegetables every day. It may seem like a lot, but it is not hard to reach this goal. A serving or helping is 1 piece of fruit, 1 cup of vegetables, or 2 cups of leafy, raw vegetables. Have an apple or some carrot sticks as an afternoon snack instead of a candy bar. Try to have fruits and/or vegetables at every meal.  · Make exercise part of your daily routine. You may want to start with simple activities, such as walking, bicycling, or slow swimming. Try to be active 30 to 60 minutes every day. You do not need to do all 30 to 60 minutes all at once.  For example, you can exercise 3 times a day for 10 or 20 minutes. Moderate exercise is safe for most people, but it is always a good idea to talk to your doctor before starting an exercise program.  · Keep moving. Bonnie Yee the lawn, work in the garden, or [employer]. Take the stairs instead of the elevator at work. · If you smoke, quit. People who smoke have an increased risk for heart attack, stroke, cancer, and other lung illnesses. Quitting is hard, but there are ways to boost your chance of quitting tobacco for good. ? Use nicotine gum, patches, or lozenges. ? Ask your doctor about stop-smoking programs and medicines. ? Keep trying. In addition to reducing your risk of diseases in the future, you will notice some benefits soon after you stop using tobacco. If you have shortness of breath or asthma symptoms, they will likely get better within a few weeks after you quit. · Limit how much alcohol you drink. Moderate amounts of alcohol (up to 2 drinks a day for men, 1 drink a day for women) are okay. But drinking too much can lead to liver problems, high blood pressure, and other health problems. Family health  If you have a family, there are many things you can do together to improve your health. · Eat meals together as a family as often as possible. · Eat healthy foods. This includes fruits, vegetables, lean meats and dairy, and whole grains. · Include your family in your fitness plan. Most people think of activities such as jogging or tennis as the way to fitness, but there are many ways you and your family can be more active. Anything that makes you breathe hard and gets your heart pumping is exercise. Here are some tips:  ? Walk to do errands or to take your child to school or the bus.  ? Go for a family bike ride after dinner instead of watching TV. Where can you learn more? Go to http://lindsay-kai.info/. Enter U919 in the search box to learn more about \"A Healthy Lifestyle: Care Instructions. \"  Current as of: September 11, 2018  Content Version: 12.1  © 4304-7907 [employer]. Care instructions adapted under license by [employer] (which disclaims liability or warranty for this information). If you have questions about a medical condition or this instruction, always ask your healthcare professional. [address] any warranty or liability for your use of this information.

## 2019-09-04 ENCOUNTER — DOCUMENTATION ONLY (OUTPATIENT)
Dept: FAMILY MEDICINE CLINIC | Age: 64
End: 2019-09-04

## 2019-09-04 RX ORDER — SUCRALFATE 1 G/1
1 TABLET ORAL 4 TIMES DAILY
Qty: 120 TAB | Refills: 11 | Status: SHIPPED | OUTPATIENT
Start: 2019-09-04 | End: 2020-09-09

## 2019-09-04 NOTE — PROGRESS NOTES
Plan of Care faxed to Sd Laird in [facility]. Fax number [phone] confirmation number [id number].

## 2019-09-06 ENCOUNTER — DOCUMENTATION ONLY (OUTPATIENT)
Dept: FAMILY MEDICINE CLINIC | Age: 64
End: 2019-09-06

## 2019-09-06 NOTE — PROGRESS NOTES
Order faxed to Sd Laird in [facility] [employer]. Fax number [phone] confirmation number [id number].

## 2019-09-09 ENCOUNTER — DOCUMENTATION ONLY (OUTPATIENT)
Dept: FAMILY MEDICINE CLINIC | Age: 64
End: 2019-09-09

## 2019-09-10 ENCOUNTER — DOCUMENTATION ONLY (OUTPATIENT)
Dept: FAMILY MEDICINE CLINIC | Age: 64
End: 2019-09-10

## 2019-09-11 LAB
AMPHETAMINES SERPL QL SCN: NEGATIVE NG/ML
BARBITURATES SERPL QL SCN: NEGATIVE UG/ML
BENZODIAZ SERPL QL SCN: NEGATIVE NG/ML
CANNABINOIDS SERPL QL SCN: NEGATIVE NG/ML
COCAINE+BZE SERPL QL SCN: NEGATIVE NG/ML
ETHYL ALCOHOL,ALC: NEGATIVE GM/DL
METHADONE SERPL QL SCN: NEGATIVE NG/ML
OPIATES SERPL QL SCN: NEGATIVE NG/ML
OXYCOCONE: 6.2 NG/ML
OXYCODONES CONFIRMATION, 738393: POSITIVE
OXYCODONES, 738315: ABNORMAL NG/ML
OXYMORPHONE, 763902: NEGATIVE NG/ML
PCP SERPL QL SCN: NEGATIVE NG/ML
PROPOXYPH SERPL QL SCN: NEGATIVE NG/ML

## 2019-09-12 ENCOUNTER — DOCUMENTATION ONLY (OUTPATIENT)
Dept: FAMILY MEDICINE CLINIC | Age: 64
End: 2019-09-12

## 2019-09-12 NOTE — PROGRESS NOTES
Order faxed to [facility] [address] in [facility]. Fax number [phone] confirmation number [id number].

## 2019-09-12 NOTE — PROGRESS NOTES
[facility] in [facility] order was put on Dr Claudell Skinner desk to process

## 2019-09-24 DIAGNOSIS — I26.99 OTHER PULMONARY EMBOLISM WITHOUT ACUTE COR PULMONALE, UNSPECIFIED CHRONICITY (HCC): ICD-10-CM

## 2019-09-24 RX ORDER — WARFARIN 2 MG/1
TABLET ORAL
Qty: 90 TAB | Refills: 3 | Status: SHIPPED | OUTPATIENT
Start: 2019-09-24 | End: 2019-11-14

## 2019-10-08 ENCOUNTER — DOCUMENTATION ONLY (OUTPATIENT)
Dept: FAMILY MEDICINE CLINIC | Age: 64
End: 2019-10-08

## 2019-10-08 NOTE — PROGRESS NOTES
Order faxed to [facility] in [facility]. Fax number [phone] confirmation number [phone].

## 2019-10-09 ENCOUNTER — TELEPHONE (OUTPATIENT)
Dept: FAMILY MEDICINE CLINIC | Age: 64
End: 2019-10-09

## 2019-10-09 NOTE — TELEPHONE ENCOUNTER
Called and spoke with [facility] and gave her dosage instructions per Dr. Wesley Ricketts

## 2019-10-10 ENCOUNTER — DOCUMENTATION ONLY (OUTPATIENT)
Dept: FAMILY MEDICINE CLINIC | Age: 64
End: 2019-10-10

## 2019-10-10 NOTE — PROGRESS NOTES
Order faxed to [address] [place] [address],[address]. Fax number [phone] confirmation number [id number].

## 2019-10-15 ENCOUNTER — TELEPHONE (OUTPATIENT)
Dept: FAMILY MEDICINE CLINIC | Age: 64
End: 2019-10-15

## 2019-10-15 NOTE — TELEPHONE ENCOUNTER
Called and spoke with [facility] and informed her to recheck in 1 month. Stated that she can cut a 5 mg in half for the 2.5 MG dose instead of using the 1 MG. She states that she will inform patient.

## 2019-10-15 NOTE — TELEPHONE ENCOUNTER
Danielle Saravia w/ At [facility] [phone]. Called in pts PT/INR    PT 25.0   INR 2.1    She states pt is currently taking 5 mg Coumadin on Monday, 2 1/2 mg all other days.     Pt would also like a refill of 1 mg Warfarin called into [facility]/[phone]

## 2019-10-15 NOTE — TELEPHONE ENCOUNTER
Recheck 1 month. Why does she need 1's if she is taking 5 and 2.5?   She can break 5's in half to make a 2.5

## 2019-10-16 ENCOUNTER — DOCUMENTATION ONLY (OUTPATIENT)
Dept: FAMILY MEDICINE CLINIC | Age: 64
End: 2019-10-16

## 2019-10-17 ENCOUNTER — DOCUMENTATION ONLY (OUTPATIENT)
Dept: FAMILY MEDICINE CLINIC | Age: 64
End: 2019-10-17

## 2019-10-22 ENCOUNTER — TELEPHONE (OUTPATIENT)
Dept: FAMILY MEDICINE CLINIC | Age: 64
End: 2019-10-22

## 2019-10-22 NOTE — TELEPHONE ENCOUNTER
Gloria Roy a registered nurse called and stated that the patient is having severe stomach pains and loose stools. No bloody or dark stools. She states that she would like a call back to know if you want her INR checked also and what to do about the stomach issues.  Please call her back at [phone]

## 2019-10-28 ENCOUNTER — DOCUMENTATION ONLY (OUTPATIENT)
Dept: FAMILY MEDICINE CLINIC | Age: 64
End: 2019-10-28

## 2019-10-29 ENCOUNTER — DOCUMENTATION ONLY (OUTPATIENT)
Dept: FAMILY MEDICINE CLINIC | Age: 64
End: 2019-10-29

## 2019-10-29 NOTE — PROGRESS NOTES
Plan of Care faxed to [address] in [facility] . Fax number [phone] confirmation number [id number].

## 2019-10-31 ENCOUNTER — DOCUMENTATION ONLY (OUTPATIENT)
Dept: FAMILY MEDICINE CLINIC | Age: 64
End: 2019-10-31

## 2019-10-31 NOTE — PROGRESS NOTES
Order faxed to [facility] in [facility]. Fax number [phone] confirmation number [phone].

## 2019-11-08 ENCOUNTER — DOCUMENTATION ONLY (OUTPATIENT)
Dept: FAMILY MEDICINE CLINIC | Age: 64
End: 2019-11-08

## 2019-11-14 ENCOUNTER — TELEPHONE (OUTPATIENT)
Dept: FAMILY MEDICINE CLINIC | Age: 64
End: 2019-11-14

## 2019-11-14 ENCOUNTER — DOCUMENTATION ONLY (OUTPATIENT)
Dept: FAMILY MEDICINE CLINIC | Age: 64
End: 2019-11-14

## 2019-11-14 ENCOUNTER — OFFICE VISIT (OUTPATIENT)
Dept: FAMILY MEDICINE CLINIC | Age: 64
End: 2019-11-14

## 2019-11-14 VITALS
OXYGEN SATURATION: 92 % | SYSTOLIC BLOOD PRESSURE: 151 MMHG | RESPIRATION RATE: 18 BRPM | HEART RATE: 73 BPM | DIASTOLIC BLOOD PRESSURE: 75 MMHG | WEIGHT: 293 LBS | BODY MASS INDEX: 41.95 KG/M2 | HEIGHT: 70 IN | TEMPERATURE: 98.1 F

## 2019-11-14 DIAGNOSIS — R07.9 CHRONIC CHEST PAIN: ICD-10-CM

## 2019-11-14 DIAGNOSIS — G89.29 CHRONIC CHEST PAIN: ICD-10-CM

## 2019-11-14 DIAGNOSIS — Z79.01 ENCOUNTER FOR MONITORING COUMADIN THERAPY: Primary | ICD-10-CM

## 2019-11-14 DIAGNOSIS — Z51.81 MEDICATION MONITORING ENCOUNTER: ICD-10-CM

## 2019-11-14 DIAGNOSIS — L89.303 PRESSURE INJURY OF BUTTOCK, STAGE 3, UNSPECIFIED LATERALITY (HCC): ICD-10-CM

## 2019-11-14 DIAGNOSIS — I20.9 ANGINA, CLASS III (HCC): ICD-10-CM

## 2019-11-14 DIAGNOSIS — I26.99 OTHER PULMONARY EMBOLISM WITHOUT ACUTE COR PULMONALE, UNSPECIFIED CHRONICITY (HCC): ICD-10-CM

## 2019-11-14 DIAGNOSIS — Z51.81 ENCOUNTER FOR MONITORING COUMADIN THERAPY: Primary | ICD-10-CM

## 2019-11-14 LAB
INR BLD: 3.4
PT POC: 31.2 SECONDS
VALID INTERNAL CONTROL?: YES

## 2019-11-14 RX ORDER — OXYCODONE AND ACETAMINOPHEN 7.5; 325 MG/1; MG/1
1 TABLET ORAL
Qty: 90 TAB | Refills: 0 | Status: SHIPPED | OUTPATIENT
Start: 2019-11-14 | End: 2019-12-11 | Stop reason: SDUPTHER

## 2019-11-14 RX ORDER — ALBUTEROL SULFATE 90 UG/1
2 AEROSOL, METERED RESPIRATORY (INHALATION)
Qty: 1 INHALER | Refills: 5 | Status: SHIPPED | OUTPATIENT
Start: 2019-11-14

## 2019-11-14 RX ORDER — WARFARIN 2.5 MG/1
TABLET ORAL
Qty: 90 TAB | Refills: 3 | Status: SHIPPED | OUTPATIENT
Start: 2019-11-14 | End: 2020-07-23

## 2019-11-14 NOTE — PROGRESS NOTES
Chief Complaint   Patient presents with    Follow-up     INR    Medication Refill     Patient presents in office today for f/u for INR. Has c/o pressure ulcers. Treating with Phytoplex that the hospital gave her with no relief. She states that they are open and bleed. Needs a refill of her percocet. No other concerns. 1. Have you been to the ER, urgent care clinic since your last visit? Hospitalized since your last visit? Yes When: 9/2019 Where: [address] [place] [address] Reason for visit: Chest pain    2. Have you seen or consulted any other health care providers outside of the [address] [place] [address] since your last visit? Include any pap smears or colon screening.  No    Learning Assessment 6/28/2019   PRIMARY LEARNER Patient   PRIMARY LANGUAGE ENGLISH   LEARNER PREFERENCE PRIMARY LISTENING   ANSWERED BY patient    RELATIONSHIP SELF

## 2019-11-14 NOTE — PROGRESS NOTES
[facility] dropped off medical form to be completed please. Please fax to [phone] when completed. Let in bin up front. Thanks.

## 2019-11-14 NOTE — PROGRESS NOTES
Order faxed to S[facility] Laird in [facility]. Fax number [phone] confirmation number [id number].

## 2019-11-14 NOTE — PROGRESS NOTES
Referral faxed to [facility]. Fax number [phone] confirmation number [id number].

## 2019-11-14 NOTE — PROGRESS NOTES
Veronica Dobbins is a 66 y.o. female   Chief Complaint   Patient presents with    Follow-up     INR    Medication Refill    pt here for follow up and was recently discharged and states she has multiple pressure sores on her buttocks. Pt had an infection of her R knee arthroplasty so has a spacer currently. Pt off abx. Pt is unable to get up for these to be inspected, will send [facility] wound care out. Pt prefers at home care. INR currently elevated at 3.4, taking 2.5 all days except Monday taking 5 mg. Pt also needs refill of her pain medication used for chronic chest pain from cardiac. Had another cath and was found ambrosio have another blockage they were unable to stent. she is a 66y.o. year old female who presents for evalution. Reviewed PmHx, RxHx, FmHx, SocHx, AllgHx and updated and dated in the chart. Review of Systems - negative except as listed above in the HPI    Objective:     Vitals:    11/14/19 1323   BP: 151/75   Pulse: 73   Resp: 18   Temp: 98.1 °F (36.7 °C)   TempSrc: Oral   SpO2: 92%   Weight: 301 lb (136.5 kg)   Height: 5' 10\" (1.778 m)       Current Outpatient Medications   Medication Sig    albuterol (PROAIR HFA) 90 mcg/actuation inhaler Take 2 Puffs by inhalation every four (4) hours as needed for Wheezing.  warfarin (COUMADIN) 2.5 mg tablet TAKE 1 TABLET BY MOUTH DAILY FOR LUNG EMBOLISM    oxyCODONE-acetaminophen (PERCOCET 7.5) 7.5-325 mg per tablet Take 1 Tab by mouth every eight (8) hours as needed for Pain for up to 30 days. Max Daily Amount: 3 Tabs.  sucralfate (CARAFATE) 1 gram tablet Take 1 Tab by mouth four (4) times daily.  metoclopramide HCl (REGLAN) 10 mg tablet Take 10 mg by mouth Before breakfast, lunch, dinner and at bedtime.  atorvastatin (LIPITOR) 80 mg tablet Take 1 Tab by mouth nightly.     carvedilol (COREG) 25 mg tablet TAKE 1 AND 1/2 TABLET BY MOUTH TWO TIMES DAILY WITH MEALS    nitroglycerin (NITROSTAT) 0.4 mg SL tablet 0.4 mg by SubLINGual route every five (5) minutes as needed for Chest Pain. Up to 3 doses.  Oxygen O2 2L NC 24/7    mupirocin (BACTROBAN) 2 % ointment Apply  to affected area four (4) times daily.  allopurinol (ZYLOPRIM) 100 mg tablet TAKE 1 TABLET BY MOUTH EVERY DAY    isosorbide mononitrate ER (IMDUR) 120 mg CR tablet TAKE 1 TABLET BY MOUTH EVERY DAY    pantoprazole (PROTONIX) 40 mg tablet TAKE 1 TABLET BY MOUTH TWICE A DAY FOR HEARTBURN    ergocalciferol (VITAMIN D2) 50,000 unit capsule Take 50,000 Units by mouth every Tuesday.  fluticasone furoate-vilanterol (BREO ELLIPTA) 100-25 mcg/dose inhaler Take 1 Puff by inhalation daily.  enoxaparin (LOVENOX) injection 140 mg by SubCUTAneous route daily.  amLODIPine (NORVASC) 10 mg tablet Take 10 mg by mouth Daily (before breakfast).  clopidogrel (PLAVIX) 75 mg tab Take 1 Tab by mouth daily.  diclofenac (VOLTAREN) 1 % gel Apply 2 g to affected area four (4) times daily. As needed for right knee pain (Patient not taking: Reported on 6/28/2019)    insulin aspart protamine/insulin aspart (NOVOLOG MIX 70-30 U-100 INSULN) 100 unit/mL (70-30) injection 10-40 Units by SubCUTAneous route nightly as needed (BG > 160). Sliding Scale     No current facility-administered medications for this visit. Physical Examination: General appearance - alert, well appearing, and in no distress  Chest - clear to auscultation, no wheezes, rales or rhonchi, symmetric air entry  Heart - normal rate, regular rhythm, normal S1, S2, no murmurs, rubs, clicks or gallops      Assessment/ Plan:   Diagnoses and all orders for this visit:    1. Encounter for monitoring Coumadin therapy  -     AMB POC PT/INR    2. Other pulmonary embolism without acute cor pulmonale, unspecified chronicity (HCC)  -     warfarin (COUMADIN) 2.5 mg tablet; TAKE 1 TABLET BY MOUTH DAILY FOR LUNG EMBOLISM    3. Chronic chest pain  -     oxyCODONE-acetaminophen (PERCOCET 7.5) 7.5-325 mg per tablet;  Take 1 Tab by mouth every eight (8) hours as needed for Pain for up to 30 days. Max Daily Amount: 3 Tabs. 4. Angina, class III (HCC)  -     oxyCODONE-acetaminophen (PERCOCET 7.5) 7.5-325 mg per tablet; Take 1 Tab by mouth every eight (8) hours as needed for Pain for up to 30 days. Max Daily Amount: 3 Tabs. 5. Medication monitoring encounter  -     oxyCODONE-acetaminophen (PERCOCET 7.5) 7.5-325 mg per tablet; Take 1 Tab by mouth every eight (8) hours as needed for Pain for up to 30 days. Max Daily Amount: 3 Tabs. 6. Pressure injury of buttock, stage 3, unspecified laterality ([facility]  -     [address] [place] [address]    Other orders  -     albuterol (PROAIR HFA) 90 mcg/actuation inhaler; Take 2 Puffs by inhalation every four (4) hours as needed for Wheezing. Follow-up and Dispositions    · Return in about 2 weeks (around 11/28/2019), or if symptoms worsen or fail to improve. I have discussed the diagnosis with the patient and the intended plan as seen in the above orders. The patient has received an after-visit summary and questions were answered concerning future plans. Pt conveyed understanding of plan.     Medication Side Effects and Warnings were discussed with patient      [facility], DO

## 2019-11-14 NOTE — PATIENT INSTRUCTIONS
A Healthy Lifestyle: Care Instructions  Your Care Instructions    A healthy lifestyle can help you feel good, stay at a healthy weight, and have plenty of energy for both work and play. A healthy lifestyle is something you can share with your whole family. A healthy lifestyle also can lower your risk for serious health problems, such as high blood pressure, heart disease, and diabetes. You can follow a few steps listed below to improve your health and the health of your family. Follow-up care is a key part of your treatment and safety. Be sure to make and go to all appointments, and call your doctor if you are having problems. It's also a good idea to know your test results and keep a list of the medicines you take. How can you care for yourself at home? · Do not eat too much sugar, fat, or fast foods. You can still have dessert and treats now and then. The goal is moderation. · Start small to improve your eating habits. Pay attention to portion sizes, drink less juice and soda pop, and eat more fruits and vegetables. ? Eat a healthy amount of food. A 3-ounce serving of meat, for example, is about the size of a deck of cards. Fill the rest of your plate with vegetables and whole grains. ? Limit the amount of soda and sports drinks you have every day. Drink more water when you are thirsty. ? Eat at least 5 servings of fruits and vegetables every day. It may seem like a lot, but it is not hard to reach this goal. A serving or helping is 1 piece of fruit, 1 cup of vegetables, or 2 cups of leafy, raw vegetables. Have an apple or some carrot sticks as an afternoon snack instead of a candy bar. Try to have fruits and/or vegetables at every meal.  · Make exercise part of your daily routine. You may want to start with simple activities, such as walking, bicycling, or slow swimming. Try to be active 30 to 60 minutes every day. You do not need to do all 30 to 60 minutes all at once.  For example, you can exercise 3 times a day for 10 or 20 minutes. Moderate exercise is safe for most people, but it is always a good idea to talk to your doctor before starting an exercise program.  · Keep moving. Jessy Sened the lawn, work in the garden, or [employer]. Take the stairs instead of the elevator at work. · If you smoke, quit. People who smoke have an increased risk for heart attack, stroke, cancer, and other lung illnesses. Quitting is hard, but there are ways to boost your chance of quitting tobacco for good. ? Use nicotine gum, patches, or lozenges. ? Ask your doctor about stop-smoking programs and medicines. ? Keep trying. In addition to reducing your risk of diseases in the future, you will notice some benefits soon after you stop using tobacco. If you have shortness of breath or asthma symptoms, they will likely get better within a few weeks after you quit. · Limit how much alcohol you drink. Moderate amounts of alcohol (up to 2 drinks a day for men, 1 drink a day for women) are okay. But drinking too much can lead to liver problems, high blood pressure, and other health problems. Family health  If you have a family, there are many things you can do together to improve your health. · Eat meals together as a family as often as possible. · Eat healthy foods. This includes fruits, vegetables, lean meats and dairy, and whole grains. · Include your family in your fitness plan. Most people think of activities such as jogging or tennis as the way to fitness, but there are many ways you and your family can be more active. Anything that makes you breathe hard and gets your heart pumping is exercise. Here are some tips:  ? Walk to do errands or to take your child to school or the bus.  ? Go for a family bike ride after dinner instead of watching TV. Where can you learn more? Go to http://lindsay-kai.info/. Enter X055 in the search box to learn more about \"A Healthy Lifestyle: Care Instructions. \"  Current as of: May 28, 2019  Content Version: 12.2  © 3612-8828 [employer]. Care instructions adapted under license by [employer] (which disclaims liability or warranty for this information). If you have questions about a medical condition or this instruction, always ask your healthcare professional. [address] any warranty or liability for your use of this information.

## 2019-11-26 ENCOUNTER — TELEPHONE (OUTPATIENT)
Dept: CARDIOLOGY CLINIC | Age: 64
End: 2019-11-26

## 2019-12-10 ENCOUNTER — TELEPHONE (OUTPATIENT)
Dept: CARDIOLOGY CLINIC | Age: 64
End: 2019-12-10

## 2019-12-10 NOTE — TELEPHONE ENCOUNTER
Narciso Banegas from [address] [place] [address] is requesting cardiac clearance for pt. Would like to schedule surgery very soon but need to make sure they have clearence first. Please advise.      Phone: [phone]  Fax: [phone]

## 2019-12-10 NOTE — TELEPHONE ENCOUNTER
Patient states that she dropped off paperwork to be filled our for cardiac clearance two weeks ago and would like to check on the status. Thanks.      Phone:  [phone]

## 2019-12-11 ENCOUNTER — OFFICE VISIT (OUTPATIENT)
Dept: FAMILY MEDICINE CLINIC | Age: 64
End: 2019-12-11

## 2019-12-11 VITALS
SYSTOLIC BLOOD PRESSURE: 182 MMHG | TEMPERATURE: 98.5 F | HEIGHT: 70 IN | OXYGEN SATURATION: 100 % | WEIGHT: 254 LBS | BODY MASS INDEX: 36.36 KG/M2 | HEART RATE: 73 BPM | DIASTOLIC BLOOD PRESSURE: 74 MMHG | RESPIRATION RATE: 18 BRPM

## 2019-12-11 DIAGNOSIS — Z51.81 MEDICATION MONITORING ENCOUNTER: ICD-10-CM

## 2019-12-11 DIAGNOSIS — G89.29 CHRONIC CHEST PAIN: ICD-10-CM

## 2019-12-11 DIAGNOSIS — I20.9 ANGINA, CLASS III (HCC): ICD-10-CM

## 2019-12-11 DIAGNOSIS — R07.9 CHRONIC CHEST PAIN: ICD-10-CM

## 2019-12-11 LAB
INR BLD: 5
PT POC: 60.3 SECONDS
VALID INTERNAL CONTROL?: YES

## 2019-12-11 RX ORDER — METOCLOPRAMIDE 10 MG/1
10 TABLET ORAL
Qty: 90 TAB | Refills: 5 | Status: SHIPPED | OUTPATIENT
Start: 2019-12-11 | End: 2020-05-21 | Stop reason: ALTCHOICE

## 2019-12-11 RX ORDER — OXYCODONE AND ACETAMINOPHEN 7.5; 325 MG/1; MG/1
1 TABLET ORAL
Qty: 90 TAB | Refills: 0 | Status: SHIPPED | OUTPATIENT
Start: 2019-12-11 | End: 2020-01-10

## 2019-12-11 NOTE — PROGRESS NOTES
Chief Complaint   Patient presents with    Anticoagulation    Knee Pain    Medication Refill    Chronic Kidney Disease     Dialysis: [facility]     Knee surgery scheduled next week. Patient unable to stand for weight check: latest self weight 254 lbs. 1. Have you been to the ER, urgent care clinic since your last visit? Hospitalized since your last visit? No    2. Have you seen or consulted any other health care providers outside of the [address] [place] [address] since your last visit? Include any pap smears or colon screening. Yes Where: [facility]     Results for orders placed or performed in visit on 12/11/19   AMB POC PT/INR   Result Value Ref Range    VALID INTERNAL CONTROL POC Yes     Prothrombin time (POC) 60.3 seconds    INR POC 5.0          Results for orders placed or performed in visit on 12/11/19   AMB POC PT/INR   Result Value Ref Range    VALID INTERNAL CONTROL POC Yes     Prothrombin time (POC) 60.3 seconds    INR POC 5.0        Chief Complaint   Patient presents with    Anticoagulation    Knee Pain    Medication Refill    Chronic Kidney Disease     Dialysis: [facility]     She is a 66 y.o. female who presents for evalution. Reviewed PmHx, RxHx, FmHx, SocHx, AllgHx and updated and dated in the chart.     Patient Active Problem List    Diagnosis    ESRD (end stage renal disease) [facility]    S/P ORIF (open reduction internal fixation) fracture    Nephrolithiasis    Symptomatic PVCs    (HFpEF) heart failure with preserved ejection fraction ([address]    ESRD on hemodialysis ([address]    Limited mobility    Hx of deep venous thrombosis    Diabetic gastroparesis (HCC)    GERD (gastroesophageal reflux disease)    Rheumatoid arteritis ([address]    DM type 2 causing neurological disease ([address]    DM type 2 causing renal disease ([address]    DM type 2 causing vascular disease ([address]    Gout    ILD (interstitial lung disease) (HCC)    Warfarin anticoagulation    COPD, severe ([address]    DM (diabetes mellitus), type 2 with renal complications (HCC)    Normocytic anemia    HTN (hypertension)    Morbid obesity    Gastroparesis    Pulmonary HTN    CAD (coronary Artery Disease) Native Artery     [place] 2004  1v CAD by cath - PCI OM with SAL  Cath 5/2004 - patent stent, no new disease  Lexiscan cardiolite 12/09- normal perfusion, EF 66%  Cath: 3/19/12: PA 55/30 mean 41, wedge 26, RA 24, EDP 35, LVG 55%, LM normal, LAD normal, LCX - OM1 patent stent, OM2 prox 85% long, % with L to R collaterals         JASEN on CPAP     Dr. Erin Nixon CPAP      Sleep apnea       Review of Systems - negative except as listed above in the HPI    Objective:     Vitals:    12/11/19 1529   BP: 182/74   Pulse: 73   Resp: 18   Temp: 98.5 °F (36.9 °C)   TempSrc: Oral   SpO2: 100%   Weight: 254 lb (115.2 kg)   Height: 5' 10\" (1.778 m)     Physical Examination: General appearance - alert, well appearing, and in no distress  Chest - clear to auscultation, no wheezes, rales or rhonchi, symmetric air entry  Heart - normal rate, regular rhythm, normal S1, S2, no murmurs, rubs, clicks or gallops    Assessment/ Plan:   Diagnoses and all orders for this visit:    1. Chronic chest pain  -     oxyCODONE-acetaminophen (PERCOCET 7.5) 7.5-325 mg per tablet; Take 1 Tab by mouth every eight (8) hours as needed for Pain for up to 30 days. Max Daily Amount: 3 Tabs. -refill rx  -pt has Knee replacement set up for next week    2. Angina, class III (HCC)  -     oxyCODONE-acetaminophen (PERCOCET 7.5) 7.5-325 mg per tablet; Take 1 Tab by mouth every eight (8) hours as needed for Pain for up to 30 days. Max Daily Amount: 3 Tabs. 3. Medication monitoring encounter  -     oxyCODONE-acetaminophen (PERCOCET 7.5) 7.5-325 mg per tablet; Take 1 Tab by mouth every eight (8) hours as needed for Pain for up to 30 days. Max Daily Amount: 3 Tabs.   -     AMB POC PT/INR-=5  -hold coumadin until Monday  -has surgery next week      Other orders  -     metoclopramide HCl (REGLAN) 10 mg tablet; Take 1 Tab by mouth Before breakfast, lunch, dinner and at bedtime. Take 10 mg by mouth Before breakfast, lunch, dinner and at bedtime. Follow-up and Dispositions    · Return in about 5 days (around 12/16/2019). I have discussed the diagnosis with the patient and the intended plan as seen in the above orders. The patient understands and agrees with the plan. The patient has received an after-visit summary and questions were answered concerning future plans. Medication Side Effects and Warnings were discussed with patient  Patient Labs were reviewed and or requested:  Patient Past Records were reviewed and or requested    David To M.D. There are no Patient Instructions on file for this visit.

## 2019-12-13 ENCOUNTER — OFFICE VISIT (OUTPATIENT)
Dept: CARDIOLOGY CLINIC | Age: 64
End: 2019-12-13

## 2019-12-13 VITALS
SYSTOLIC BLOOD PRESSURE: 154 MMHG | DIASTOLIC BLOOD PRESSURE: 64 MMHG | HEART RATE: 65 BPM | OXYGEN SATURATION: 92 % | HEIGHT: 70 IN | RESPIRATION RATE: 20 BRPM | BODY MASS INDEX: 36.45 KG/M2

## 2019-12-13 DIAGNOSIS — E66.01 MORBID OBESITY (HCC): ICD-10-CM

## 2019-12-13 DIAGNOSIS — J44.9 COPD, SEVERE (HCC): ICD-10-CM

## 2019-12-13 DIAGNOSIS — I27.20 PULMONARY HTN (HCC): ICD-10-CM

## 2019-12-13 DIAGNOSIS — Z99.89 OSA ON CPAP: ICD-10-CM

## 2019-12-13 DIAGNOSIS — Z95.9 S/P LEFT PULMONARY ARTERY PRESSURE SENSOR IMPLANT PLACEMENT: ICD-10-CM

## 2019-12-13 DIAGNOSIS — Z79.01 CHRONIC ANTICOAGULATION: ICD-10-CM

## 2019-12-13 DIAGNOSIS — N18.6 ESRD ON HEMODIALYSIS (HCC): ICD-10-CM

## 2019-12-13 DIAGNOSIS — I50.32 CHRONIC HEART FAILURE WITH PRESERVED EJECTION FRACTION (HCC): ICD-10-CM

## 2019-12-13 DIAGNOSIS — I25.118 CORONARY ARTERY DISEASE INVOLVING NATIVE CORONARY ARTERY OF NATIVE HEART WITH OTHER FORM OF ANGINA PECTORIS (HCC): Primary | ICD-10-CM

## 2019-12-13 DIAGNOSIS — I49.3 PVC (PREMATURE VENTRICULAR CONTRACTION): ICD-10-CM

## 2019-12-13 DIAGNOSIS — Z99.2 ESRD ON HEMODIALYSIS (HCC): ICD-10-CM

## 2019-12-13 DIAGNOSIS — G47.33 OSA ON CPAP: ICD-10-CM

## 2019-12-13 RX ORDER — AMLODIPINE BESYLATE 10 MG/1
5 TABLET ORAL DAILY
Qty: 30 TAB | Refills: 3
Start: 2019-12-13 | End: 2019-12-19 | Stop reason: SDUPTHER

## 2019-12-13 NOTE — PROGRESS NOTES
Nora Avalos MD [facility]  [address] Sujit Mejia, [facility], [id number] [place] [address]    Office [phone]  Fax [phone]  Cell [phone]      Devaughn Garcia is a 66 y.o. female last seen by me 6 months ago. Assessment  Encounter Diagnoses   Name Primary?  Coronary artery disease involving native coronary artery of native heart without angina pectoris Yes    Chronic heart failure with preserved ejection fraction (HCC)     Pulmonary HTN     COPD, severe (HCC)     JASEN on CPAP     PVC (premature ventricular contraction)     Chronic anticoagulation     O2 dependent     Morbid obesity (HCC)     ESRD on hemodialysis (HCC)      Recommendations:    HFpEF, class 3 with moderate to severe PA HTN, s/p cardioMEMS. She has not had any recent readings. Volume is being managed by dialysis. She is no longer on diuretic therapy. Chronic lung disease, no longer oxygen dependent. Stable LARA    CAD, s/p PCI RCA  Feb 2019 by Dr. Flakita Glaser. She had LCX 60%, negative FFR. Repeat cath at [address] [place] [address] in Sept apparently showed a new lesion, unable to PCI (no details available). However, she has no exertional angina, albeit at a very limited functional capacity. Recurrent spells of nocturnal chest pain are chronic and most likely GERD. - Continue Imdur 120mg/d, PRN SLNTG  - Resume Plavix post TKR    Symptomatic PVCs, largely controlled on Carvedilol. Holter Jan 2019 without AF    Chronic anemia due to ESRD procrit resistant    T2DM managed by [facility], DO.    HTN, suboptimal control on Coreg plus Imdur. She was previously on Amlodipine 10, d/c 3 months ago. Resume Amlodipine 5mg/d. Morbid Obesity. ESRD on HD. Chronic anticoagulation due to previous DVT. Continue Coumadin. INR being followed by Dr. Josue Jaime. She is currently off Warfarin at this time d/t eleveated INR. Upcoming redo right TKR. Her cardiac risk would be considered intermediate-high.  All her cardiopulmonary conditions are stable. Would proceed with surgery as planned. She is already off Plavix, but would resume post-op when safe to do so. Follow-up and Dispositions    · Return in about 6 months (around 6/13/2020). Subjective:    Alonzo Carter reports underwent explantation of her previous right TKR in July and has had a spacer in place since then. Her joint is free of infection and she is undergoing redo TKR with Dr. Jama Sifuentes at [facility] near future. She is essentially wheelchair bound. She has pressure sores on her buttocks. She still complains of random weekly  episodes of nocturnal chest pain, relieved by sublingual nitro. She has a hard time discerning angina and GERD. Patient denies any syncope, orthopnea, edema or paroxysmal nocturnal dyspnea. She had a spell of dyspnea and chest pain in September and underwent cath at [address] [place] [address] with new lesion. Attempts at PCI unsuccessful. No change in her sx status since then. plavix was discontinued at that time for unclear reasons. Her home BP was been 179/90. Apparently amlodipine stopped in September due to low BP    She is adherent with dialysis. She has not required O2 except at night, with CPAP. Cardiac risk factors   HTN yes  DM yes    Cardiac testing  CATH: 2004: 1v CAD by cath - PCI OM with SAL  CATH 5/2004 - patent stent, no new disease   Lexiscan cardiolite 12/09- normal perfusion, EF 66%  ECHO 11/2009: LVH, EF 62%, diastolic dysfunction  STRESS/LEXISCAN/CARDIOLITE 3/29/11: normal perfusion, EF 62%  CATH: 3/20/2012 :PA 55/30 mean 41, wedge 26, RA 24, EDP 35, LVG 55%, LM normal, LAD normal, LCX - OM1 patent stent, OM2 prox 85% long, % w/ L -> R collateral; s/p SAL-> OM2/OM3 with SAL. CATH: 10/4/2012: EDP 25, EF 55%, LM nl, LAD mild plaque, LCX patent OM stents, % prox with good L to R collaterals.   Cath 3/18/2014 - RA 9 PA 42/19/29, PCW 12, EDP 25, no LVG due to CKD, LM nl, LAD mild plaque, LCX patent stents OM1/OM2, RCA chronic proximal occlusion with L to R collaterals. ECHO 4/11/16: EF 60-65%. No WMA. LA mildly dilated. Lexiscan Cardiolite 4/11/16: Normal. LVEF 55%. Compared to 3/29/11, no significant changes. RHC/CardioMEMS implant 8/31/17 - RA 12, PA 56/20/30, CI (Mayte) 2.65    Echo 11/30/17 - EF 65%. G1DD. No WMA. Wall thickness was mildly to moderately increased. Limited Echo 12/13/17 - Tricuspid valve: Pulmonary artery diastolic pressure: 90.24 mmHg. [address] 6/5/18 Moderate-severe PA HTN, post capillary  Marked elevation in biV filling pressures    Event Monitor in 8/2018 demonstrated frequent PVCs    48 hour Holter monitor 2/5/19 - SB to ST 53 to 109, average HR 73. No AF/flutter. Echo 2/22/19 - EF 66-70%    Past Medical History:   Diagnosis Date     Sleep Apnea 2/16/2011    Compliant with c-pap.  Aortic aneurysm ([facility])     Abdominal    CAD (coronary Artery Disease) Native Artery 2/16/2011    Chronic kidney disease     Hemodiaylsis  3x/week    CKD (chronic kidney disease) stage 4, GFR 15-29 ml/min ([facility]) 2/10/2017    Coagulation disorder ([address] 06/2018    Anemia  Last blood Transfusion    COPD (chronic obstructive pulmonary disease) ([facility])     Diabetic gastroparesis ([facility])     Diastolic heart failure ([address] 10/5/2012    DM type 2 causing neurological disease ([address]     DM type 2 causing renal disease ([facility])     Insulin SS at night only PRN    DM type 2 causing vascular disease ([address]     Esophageal stricture 2012    dialted Dr. Prem Rodriguez G6PD deficiency     trait    GERD (gastroesophageal reflux disease)     Gout     Hemodialysis access, AV graft ([address] 04/02/2017    Dialysis MWF    [facility] [address]      Hypertension     ILD (interstitial lung disease) ([address]     open lung bx CJW 2010    Morbid obesity ([address]     OA (osteoarthritis)     Ovarian cancer ([address]     cervical and uterine    Rheumatoid arteritis (HCC)     S/P left pulmonary artery pressure sensor implant placement 8/31/2017    Cardiomems  Thromboembolus ([facility]     Right calf     Tobacco use disorder 3/21/2012    Uterine cervix cancer (HCC)     Vitamin D deficiency 8/9/2013        Current Outpatient Medications   Medication Sig Dispense Refill    oxyCODONE-acetaminophen (PERCOCET 7.5) 7.5-325 mg per tablet Take 1 Tab by mouth every eight (8) hours as needed for Pain for up to 30 days. Max Daily Amount: 3 Tabs. 90 Tab 0    metoclopramide HCl (REGLAN) 10 mg tablet Take 1 Tab by mouth Before breakfast, lunch, dinner and at bedtime. Take 10 mg by mouth Before breakfast, lunch, dinner and at bedtime. 90 Tab 5    albuterol (PROAIR HFA) 90 mcg/actuation inhaler Take 2 Puffs by inhalation every four (4) hours as needed for Wheezing. 1 Inhaler 5    warfarin (COUMADIN) 2.5 mg tablet TAKE 1 TABLET BY MOUTH DAILY FOR LUNG EMBOLISM 90 Tab 3    sucralfate (CARAFATE) 1 gram tablet Take 1 Tab by mouth four (4) times daily. 120 Tab 11    atorvastatin (LIPITOR) 80 mg tablet Take 1 Tab by mouth nightly. 90 Tab 3    carvedilol (COREG) 25 mg tablet TAKE 1 AND 1/2 TABLET BY MOUTH TWO TIMES DAILY WITH MEALS 270 Tab 3    nitroglycerin (NITROSTAT) 0.4 mg SL tablet 0.4 mg by SubLINGual route every five (5) minutes as needed for Chest Pain. Up to 3 doses.  Oxygen O2 2L NC 24/7      allopurinol (ZYLOPRIM) 100 mg tablet TAKE 1 TABLET BY MOUTH EVERY DAY 30 Tab 4    isosorbide mononitrate ER (IMDUR) 120 mg CR tablet TAKE 1 TABLET BY MOUTH EVERY DAY 90 Tab 3    pantoprazole (PROTONIX) 40 mg tablet TAKE 1 TABLET BY MOUTH TWICE A DAY FOR HEARTBURN 180 Tab 3    ergocalciferol (VITAMIN D2) 50,000 unit capsule Take 50,000 Units by mouth every Tuesday.  insulin aspart protamine/insulin aspart (NOVOLOG MIX 70-30 U-100 INSULN) 100 unit/mL (70-30) injection 10-40 Units by SubCUTAneous route nightly as needed (BG > 160).  Sliding Scale         Allergies   Allergen Reactions    Contrast Dye [Iodine] Anaphylaxis     Tolerates when pre medicated w/ IV solumedrol and benadryl prior to procedure     Levaquin [Levofloxacin] Nausea and Vomiting    Morphine Hives and Itching      Review of Systems  Constitutional: Negative for fever, chills, malaise/fatigue and diaphoresis. Respiratory: Negative for cough, hemoptysis, sputum production, and wheezing. +LARA  Cardiovascular: Negative for orthopnea, claudication, leg swelling and PND. +chest pain/pressure, palpitations  Gastrointestinal: Negative for heartburn, nausea, vomiting, blood in stool and melena. +GERD  Genitourinary: Negative for dysuria and flank pain. Musculoskeletal: Negative for joint pain and back pain. +right knee pain  Skin: Negative for rash. +pressure sores buttocks  Neurological: Negative for focal weakness, seizures, loss of consciousness, weakness and headaches. Endo/Heme/Allergies: Does not bruise/bleed easily. Psychiatric/Behavioral: Negative for memory loss. The patient does not have insomnia. Physical Exam:  Visit Vitals  /64 (BP 1 Location: Right arm, BP Patient Position: Sitting)   Pulse 65   Resp 20   Ht 5' 10\" (1.778 m)   SpO2 92%   BMI 36.45 kg/m²     Wt Readings from Last 3 Encounters:   12/11/19 254 lb (115.2 kg)   11/14/19 301 lb (136.5 kg)   09/03/19 301 lb (136.5 kg)      General - well developed well nourished, morbidly obese BF  Neck - JVP difficult to assess, thyroid nl  Cardiac - normal S1, S2, no murmurs, rubs or gallops. No clicks  Vascular - carotids without bruits, radials, femorals and pedal pulses equal bilateral  Lungs - clear to auscultation bilaterals, no rales, wheezing or rhonchi  Abd - soft nontender, no HSM, no abd bruits  Extremities - 1-2+ pedal edema and pre-tibial edema.    Skin - no rash  Neuro - nonfocal  Psych - normal mood and affect    Cardiographics  Event Monitor in 8/2018 demonstrated frequent PVCs  Echo 2/22/19 - EF 66-70%  EKG 12/13/19 - SR 89, , unchanged from 2/21/19         Written by Costa Barnes, as dictated by Wendi Reyna M.D. Fausto Cevallos MD

## 2019-12-13 NOTE — PROGRESS NOTES
Nettie De La Fuente is a 66 y.o. female    Chief Complaint   Patient presents with    Follow-up     6 mo f/u    Coronary Artery Disease    CHF    Surgical Clearance     Next week at [facility]       Chest pain : NO  SOB : NO  Dizziness : NO  Edema : NO  Refills : NO    Visit Vitals  /64 (BP 1 Location: Right arm, BP Patient Position: Sitting)   Pulse 65   Resp 20   Ht 5' 10\" (1.778 m)   SpO2 92%   BMI 36.45 kg/m²       1. Have you been to the ER, urgent care clinic since your last visit? Hospitalized since your last visit? No    2. Have you seen or consulted any other health care providers outside of the [address] [place] [address] since your last visit? Include any pap smears or colon screening.  No

## 2019-12-13 NOTE — LETTER
12/13/19 Patient: Royce Faith YOB: 1955 Date of Visit: 12/13/2019 Yue Wright DO 
[address] [address] [facility] [address] VIA In Basket Dear Yue Wright DO, Thank you for referring Ms. Savannah Brittle to [address] for evaluation. My notes for this consultation are attached. If you have questions, please do not hesitate to call me. I look forward to following your patient along with you. Sincerely, Sadia West MD

## 2019-12-16 ENCOUNTER — OFFICE VISIT (OUTPATIENT)
Dept: FAMILY MEDICINE CLINIC | Age: 64
End: 2019-12-16

## 2019-12-19 ENCOUNTER — OFFICE VISIT (OUTPATIENT)
Dept: FAMILY MEDICINE CLINIC | Age: 64
End: 2019-12-19

## 2019-12-19 VITALS
TEMPERATURE: 98.6 F | OXYGEN SATURATION: 95 % | BODY MASS INDEX: 36.45 KG/M2 | HEART RATE: 82 BPM | RESPIRATION RATE: 20 BRPM | HEIGHT: 70 IN | DIASTOLIC BLOOD PRESSURE: 77 MMHG | SYSTOLIC BLOOD PRESSURE: 150 MMHG

## 2019-12-19 DIAGNOSIS — I10 ESSENTIAL HYPERTENSION: Primary | Chronic | ICD-10-CM

## 2019-12-19 DIAGNOSIS — I26.99 PULMONARY EMBOLISM WITHOUT ACUTE COR PULMONALE, UNSPECIFIED CHRONICITY, UNSPECIFIED PULMONARY EMBOLISM TYPE (HCC): ICD-10-CM

## 2019-12-19 RX ORDER — AMLODIPINE BESYLATE 10 MG/1
10 TABLET ORAL DAILY
Qty: 90 TAB | Refills: 3 | Status: SHIPPED | OUTPATIENT
Start: 2019-12-19 | End: 2021-02-17

## 2019-12-19 NOTE — PROGRESS NOTES
Joe Rogers is a 60 y.o. female , id x 2(name and ). Reviewed record, history, and  medications. Chief Complaint   Patient presents with    Other     inr    Medication Refill     Amlodipine       Vitals:    19 1015   BP: 150/77   Pulse: 82   Resp: 20   Temp: 98.6 °F (37 °C)   TempSrc: Oral   SpO2: 95%   Height: 5' 10\" (1.778 m)   PainSc:   8   PainLoc: Knee       Coordination of Care Questionnaire:   1) Have you been to an emergency room, urgent care, or hospitalized since your last visit?   no       2. Have seen or consulted any other health care provider since your last visit? NO      3 most recent PHQ Screens 2019   Little interest or pleasure in doing things Not at all   Feeling down, depressed, irritable, or hopeless Not at all   Total Score PHQ 2 0       Patient is accompanied by  I have received verbal consent from Joe Rogers to discuss any/all medical information while they are present in the room.

## 2019-12-19 NOTE — PATIENT INSTRUCTIONS
A Healthy Lifestyle: Care Instructions  Your Care Instructions    A healthy lifestyle can help you feel good, stay at a healthy weight, and have plenty of energy for both work and play. A healthy lifestyle is something you can share with your whole family. A healthy lifestyle also can lower your risk for serious health problems, such as high blood pressure, heart disease, and diabetes. You can follow a few steps listed below to improve your health and the health of your family. Follow-up care is a key part of your treatment and safety. Be sure to make and go to all appointments, and call your doctor if you are having problems. It's also a good idea to know your test results and keep a list of the medicines you take. How can you care for yourself at home? · Do not eat too much sugar, fat, or fast foods. You can still have dessert and treats now and then. The goal is moderation. · Start small to improve your eating habits. Pay attention to portion sizes, drink less juice and soda pop, and eat more fruits and vegetables. ? Eat a healthy amount of food. A 3-ounce serving of meat, for example, is about the size of a deck of cards. Fill the rest of your plate with vegetables and whole grains. ? Limit the amount of soda and sports drinks you have every day. Drink more water when you are thirsty. ? Eat at least 5 servings of fruits and vegetables every day. It may seem like a lot, but it is not hard to reach this goal. A serving or helping is 1 piece of fruit, 1 cup of vegetables, or 2 cups of leafy, raw vegetables. Have an apple or some carrot sticks as an afternoon snack instead of a candy bar. Try to have fruits and/or vegetables at every meal.  · Make exercise part of your daily routine. You may want to start with simple activities, such as walking, bicycling, or slow swimming. Try to be active 30 to 60 minutes every day. You do not need to do all 30 to 60 minutes all at once.  For example, you can exercise 3 times a day for 10 or 20 minutes. Moderate exercise is safe for most people, but it is always a good idea to talk to your doctor before starting an exercise program.  · Keep moving. Misael Spar the lawn, work in the garden, or [employer]. Take the stairs instead of the elevator at work. · If you smoke, quit. People who smoke have an increased risk for heart attack, stroke, cancer, and other lung illnesses. Quitting is hard, but there are ways to boost your chance of quitting tobacco for good. ? Use nicotine gum, patches, or lozenges. ? Ask your doctor about stop-smoking programs and medicines. ? Keep trying. In addition to reducing your risk of diseases in the future, you will notice some benefits soon after you stop using tobacco. If you have shortness of breath or asthma symptoms, they will likely get better within a few weeks after you quit. · Limit how much alcohol you drink. Moderate amounts of alcohol (up to 2 drinks a day for men, 1 drink a day for women) are okay. But drinking too much can lead to liver problems, high blood pressure, and other health problems. Family health  If you have a family, there are many things you can do together to improve your health. · Eat meals together as a family as often as possible. · Eat healthy foods. This includes fruits, vegetables, lean meats and dairy, and whole grains. · Include your family in your fitness plan. Most people think of activities such as jogging or tennis as the way to fitness, but there are many ways you and your family can be more active. Anything that makes you breathe hard and gets your heart pumping is exercise. Here are some tips:  ? Walk to do errands or to take your child to school or the bus.  ? Go for a family bike ride after dinner instead of watching TV. Where can you learn more? Go to http://lindsay-kai.info/. Enter I165 in the search box to learn more about \"A Healthy Lifestyle: Care Instructions. \"  Current as of: May 28, 2019  Content Version: 12.2  © 9627-2728 [employer]. Care instructions adapted under license by [employer] (which disclaims liability or warranty for this information). If you have questions about a medical condition or this instruction, always ask your healthcare professional. [address] any warranty or liability for your use of this information.

## 2019-12-19 NOTE — PROGRESS NOTES
Royce Faith is a 60 y.o. female   Chief Complaint   Patient presents with    Other     inr    Medication Refill     Amlodipine    pt here for recheck of her INR. Pt currently at 1.6 and should be 2-3. Pt also states her bp has been running high and will go to 10 on the norvasc. Pt just restarted coumadin yesterday 2.5mg once daily and will c/w this and recheck in a week. she is a 60y.o. year old female who presents for evalution. Reviewed PmHx, RxHx, FmHx, SocHx, AllgHx and updated and dated in the chart. Review of Systems - negative except as listed above in the HPI    Objective:     Vitals:    12/19/19 1015   BP: 150/77   Pulse: 82   Resp: 20   Temp: 98.6 °F (37 °C)   TempSrc: Oral   SpO2: 95%   Height: 5' 10\" (1.778 m)       Current Outpatient Medications   Medication Sig    amLODIPine (NORVASC) 10 mg tablet Take 1 Tab by mouth daily. Indications: high blood pressure    oxyCODONE-acetaminophen (PERCOCET 7.5) 7.5-325 mg per tablet Take 1 Tab by mouth every eight (8) hours as needed for Pain for up to 30 days. Max Daily Amount: 3 Tabs.  metoclopramide HCl (REGLAN) 10 mg tablet Take 1 Tab by mouth Before breakfast, lunch, dinner and at bedtime. Take 10 mg by mouth Before breakfast, lunch, dinner and at bedtime.  albuterol (PROAIR HFA) 90 mcg/actuation inhaler Take 2 Puffs by inhalation every four (4) hours as needed for Wheezing.  warfarin (COUMADIN) 2.5 mg tablet TAKE 1 TABLET BY MOUTH DAILY FOR LUNG EMBOLISM    sucralfate (CARAFATE) 1 gram tablet Take 1 Tab by mouth four (4) times daily.  atorvastatin (LIPITOR) 80 mg tablet Take 1 Tab by mouth nightly.  carvedilol (COREG) 25 mg tablet TAKE 1 AND 1/2 TABLET BY MOUTH TWO TIMES DAILY WITH MEALS    nitroglycerin (NITROSTAT) 0.4 mg SL tablet 0.4 mg by SubLINGual route every five (5) minutes as needed for Chest Pain. Up to 3 doses.     Oxygen O2 2L NC 24/7    allopurinol (ZYLOPRIM) 100 mg tablet TAKE 1 TABLET BY MOUTH EVERY DAY    isosorbide mononitrate ER (IMDUR) 120 mg CR tablet TAKE 1 TABLET BY MOUTH EVERY DAY    pantoprazole (PROTONIX) 40 mg tablet TAKE 1 TABLET BY MOUTH TWICE A DAY FOR HEARTBURN    ergocalciferol (VITAMIN D2) 50,000 unit capsule Take 50,000 Units by mouth every Tuesday.  insulin aspart protamine/insulin aspart (NOVOLOG MIX 70-30 U-100 INSULN) 100 unit/mL (70-30) injection 10-40 Units by SubCUTAneous route nightly as needed (BG > 160). Sliding Scale     No current facility-administered medications for this visit. Physical Examination: General appearance - alert, well appearing, and in no distress  Chest - clear to auscultation, no wheezes, rales or rhonchi, symmetric air entry  Heart - normal rate, regular rhythm, normal S1, S2, no murmurs, rubs, clicks or gallops      Assessment/ Plan:   Diagnoses and all orders for this visit:    1. Essential hypertension  -     amLODIPine (NORVASC) 10 mg tablet; Take 1 Tab by mouth daily. Indications: high blood pressure    2. Pulmonary embolism without acute cor pulmonale, unspecified chronicity, unspecified pulmonary embolism type ([facility]  C/w current dose recheck 1 week     Follow-up and Dispositions    · Return in about 1 week (around 12/26/2019), or if symptoms worsen or fail to improve. I have discussed the diagnosis with the patient and the intended plan as seen in the above orders. The patient has received an after-visit summary and questions were answered concerning future plans. Pt conveyed understanding of plan.     Medication Side Effects and Warnings were discussed with patient      Jo Paez DO

## 2019-12-31 ENCOUNTER — OFFICE VISIT (OUTPATIENT)
Dept: FAMILY MEDICINE CLINIC | Age: 64
End: 2019-12-31

## 2019-12-31 VITALS
BODY MASS INDEX: 36.45 KG/M2 | HEIGHT: 70 IN | TEMPERATURE: 98.5 F | HEART RATE: 67 BPM | RESPIRATION RATE: 16 BRPM | DIASTOLIC BLOOD PRESSURE: 63 MMHG | OXYGEN SATURATION: 98 % | SYSTOLIC BLOOD PRESSURE: 147 MMHG

## 2019-12-31 DIAGNOSIS — Z51.81 ENCOUNTER FOR MONITORING COUMADIN THERAPY: Primary | ICD-10-CM

## 2019-12-31 DIAGNOSIS — F41.9 ANXIETY: ICD-10-CM

## 2019-12-31 DIAGNOSIS — Z79.01 ENCOUNTER FOR MONITORING COUMADIN THERAPY: Primary | ICD-10-CM

## 2019-12-31 DIAGNOSIS — M79.89 LEFT ARM SWELLING: ICD-10-CM

## 2019-12-31 LAB
INR BLD: 3
PT POC: 36.6 SECONDS
VALID INTERNAL CONTROL?: YES

## 2019-12-31 RX ORDER — NALOXONE HYDROCHLORIDE 4 MG/.1ML
SPRAY NASAL
Qty: 1 EACH | Refills: 5 | Status: SHIPPED | OUTPATIENT
Start: 2019-12-31 | End: 2022-05-28

## 2019-12-31 RX ORDER — DIAZEPAM 5 MG/1
TABLET ORAL
Qty: 1 TAB | Refills: 0 | Status: SHIPPED | OUTPATIENT
Start: 2019-12-31 | End: 2020-05-21 | Stop reason: ALTCHOICE

## 2019-12-31 NOTE — PATIENT INSTRUCTIONS
A Healthy Lifestyle: Care Instructions  Your Care Instructions    A healthy lifestyle can help you feel good, stay at a healthy weight, and have plenty of energy for both work and play. A healthy lifestyle is something you can share with your whole family. A healthy lifestyle also can lower your risk for serious health problems, such as high blood pressure, heart disease, and diabetes. You can follow a few steps listed below to improve your health and the health of your family. Follow-up care is a key part of your treatment and safety. Be sure to make and go to all appointments, and call your doctor if you are having problems. It's also a good idea to know your test results and keep a list of the medicines you take. How can you care for yourself at home? · Do not eat too much sugar, fat, or fast foods. You can still have dessert and treats now and then. The goal is moderation. · Start small to improve your eating habits. Pay attention to portion sizes, drink less juice and soda pop, and eat more fruits and vegetables. ? Eat a healthy amount of food. A 3-ounce serving of meat, for example, is about the size of a deck of cards. Fill the rest of your plate with vegetables and whole grains. ? Limit the amount of soda and sports drinks you have every day. Drink more water when you are thirsty. ? Eat at least 5 servings of fruits and vegetables every day. It may seem like a lot, but it is not hard to reach this goal. A serving or helping is 1 piece of fruit, 1 cup of vegetables, or 2 cups of leafy, raw vegetables. Have an apple or some carrot sticks as an afternoon snack instead of a candy bar. Try to have fruits and/or vegetables at every meal.  · Make exercise part of your daily routine. You may want to start with simple activities, such as walking, bicycling, or slow swimming. Try to be active 30 to 60 minutes every day. You do not need to do all 30 to 60 minutes all at once.  For example, you can exercise 3 times a day for 10 or 20 minutes. Moderate exercise is safe for most people, but it is always a good idea to talk to your doctor before starting an exercise program.  · Keep moving. Glenis Solo the lawn, work in the garden, or [employer]. Take the stairs instead of the elevator at work. · If you smoke, quit. People who smoke have an increased risk for heart attack, stroke, cancer, and other lung illnesses. Quitting is hard, but there are ways to boost your chance of quitting tobacco for good. ? Use nicotine gum, patches, or lozenges. ? Ask your doctor about stop-smoking programs and medicines. ? Keep trying. In addition to reducing your risk of diseases in the future, you will notice some benefits soon after you stop using tobacco. If you have shortness of breath or asthma symptoms, they will likely get better within a few weeks after you quit. · Limit how much alcohol you drink. Moderate amounts of alcohol (up to 2 drinks a day for men, 1 drink a day for women) are okay. But drinking too much can lead to liver problems, high blood pressure, and other health problems. Family health  If you have a family, there are many things you can do together to improve your health. · Eat meals together as a family as often as possible. · Eat healthy foods. This includes fruits, vegetables, lean meats and dairy, and whole grains. · Include your family in your fitness plan. Most people think of activities such as jogging or tennis as the way to fitness, but there are many ways you and your family can be more active. Anything that makes you breathe hard and gets your heart pumping is exercise. Here are some tips:  ? Walk to do errands or to take your child to school or the bus.  ? Go for a family bike ride after dinner instead of watching TV. Where can you learn more? Go to http://lindsay-kai.info/. Enter G678 in the search box to learn more about \"A Healthy Lifestyle: Care Instructions. \"  Current as of: May 28, 2019  Content Version: 12.2  © 7915-2548 [employer]. Care instructions adapted under license by [employer] (which disclaims liability or warranty for this information). If you have questions about a medical condition or this instruction, always ask your healthcare professional. [address] any warranty or liability for your use of this information.

## 2019-12-31 NOTE — PROGRESS NOTES
Balbina Aceves is a 60 y.o. female had concerns including Follow-up and Hand Swelling. Pt ehre for recheck of INR currently on 2.5mg coumadin daily. 3 today. Will have her skip one day weekly. Skip Wednesdays  Recheck 3 weeks. Pt also with continued L arm edema and had her fistula checked and this was normal.  WIll send for a doppler to r/o dvt in arm. Pt is also having an aspiration of her L knee and would like a valium for prior to the procedure because she is very anxious over this. she is a 60y.o. year old female who presents for evalution. Reviewed PmHx, RxHx, FmHx, SocHx, AllgHx and updated and dated in the chart. Review of Systems - negative except as listed above in the HPI    Objective:     Vitals:    12/31/19 1433   BP: 147/63   Pulse: 67   Resp: 16   Temp: 98.5 °F (36.9 °C)   TempSrc: Oral   SpO2: 98%   Height: 5' 10\" (1.778 m)       Current Outpatient Medications   Medication Sig    diazePAM (VALIUM) 5 mg tablet 1 tab prior to procedure, do not take with opiate    naloxone (NARCAN) 4 mg/actuation nasal spray Use 1 spray intranasally, then discard. Repeat with new spray every 2 min as needed for opioid overdose symptoms, alternating nostrils.  amLODIPine (NORVASC) 10 mg tablet Take 1 Tab by mouth daily. Indications: high blood pressure    oxyCODONE-acetaminophen (PERCOCET 7.5) 7.5-325 mg per tablet Take 1 Tab by mouth every eight (8) hours as needed for Pain for up to 30 days. Max Daily Amount: 3 Tabs.  metoclopramide HCl (REGLAN) 10 mg tablet Take 1 Tab by mouth Before breakfast, lunch, dinner and at bedtime. Take 10 mg by mouth Before breakfast, lunch, dinner and at bedtime.  albuterol (PROAIR HFA) 90 mcg/actuation inhaler Take 2 Puffs by inhalation every four (4) hours as needed for Wheezing.  warfarin (COUMADIN) 2.5 mg tablet TAKE 1 TABLET BY MOUTH DAILY FOR LUNG EMBOLISM    sucralfate (CARAFATE) 1 gram tablet Take 1 Tab by mouth four (4) times daily.     atorvastatin (LIPITOR) 80 mg tablet Take 1 Tab by mouth nightly.  carvedilol (COREG) 25 mg tablet TAKE 1 AND 1/2 TABLET BY MOUTH TWO TIMES DAILY WITH MEALS    nitroglycerin (NITROSTAT) 0.4 mg SL tablet 0.4 mg by SubLINGual route every five (5) minutes as needed for Chest Pain. Up to 3 doses.  Oxygen O2 2L NC 24/7    allopurinol (ZYLOPRIM) 100 mg tablet TAKE 1 TABLET BY MOUTH EVERY DAY    isosorbide mononitrate ER (IMDUR) 120 mg CR tablet TAKE 1 TABLET BY MOUTH EVERY DAY    pantoprazole (PROTONIX) 40 mg tablet TAKE 1 TABLET BY MOUTH TWICE A DAY FOR HEARTBURN    ergocalciferol (VITAMIN D2) 50,000 unit capsule Take 50,000 Units by mouth every Tuesday.  insulin aspart protamine/insulin aspart (NOVOLOG MIX 70-30 U-100 INSULN) 100 unit/mL (70-30) injection 10-40 Units by SubCUTAneous route nightly as needed (BG > 160). Sliding Scale     No current facility-administered medications for this visit. Physical Examination: General appearance - alert, well appearing, and in no distress  Extremities - LUE edema thru out      Assessment/ Plan:   Diagnoses and all orders for this visit:    1. Encounter for monitoring Coumadin therapy  -     AMB POC PT/INR    2. Left arm swelling  -     DUPLEX UPPER EXT VENOUS LEFT; Future    3. Anxiety  -     diazePAM (VALIUM) 5 mg tablet; 1 tab prior to procedure, do not take with opiate    Other orders  -     naloxone (NARCAN) 4 mg/actuation nasal spray; Use 1 spray intranasally, then discard. Repeat with new spray every 2 min as needed for opioid overdose symptoms, alternating nostrils. Follow-up and Dispositions    · Return in about 3 weeks (around 1/21/2020), or if symptoms worsen or fail to improve. I have discussed the diagnosis with the patient and the intended plan as seen in the above orders. The patient has received an after-visit summary and questions were answered concerning future plans. Pt conveyed understanding of plan.     Medication Side Effects and Warnings were discussed with patient      Lorraine Segura DO

## 2019-12-31 NOTE — PROGRESS NOTES
Chief Complaint   Patient presents with    Follow-up    Hand Swelling     Patient presents in office today for INR check. Has c/o swelling in her left hand for about 3 weeks. Would like to discuss getting something for anxiety for a procedure she has scheduled for next week. No other concerns. 1. Have you been to the ER, urgent care clinic since your last visit? Hospitalized since your last visit? No    2. Have you seen or consulted any other health care providers outside of the [address] [place] [address] since your last visit? Include any pap smears or colon screening.  No    Learning Assessment 6/28/2019   PRIMARY LEARNER Patient   PRIMARY LANGUAGE ENGLISH   LEARNER PREFERENCE PRIMARY LISTENING   ANSWERED BY patient    RELATIONSHIP SELF

## 2020-01-03 ENCOUNTER — HOSPITAL ENCOUNTER (OUTPATIENT)
Dept: VASCULAR SURGERY | Age: 65
Discharge: HOME | End: 2020-01-03
Attending: FAMILY MEDICINE
Payer: MEDICARE

## 2020-01-03 DIAGNOSIS — M79.89 LEFT ARM SWELLING: ICD-10-CM

## 2020-01-03 PROCEDURE — 93971 EXTREMITY STUDY: CPT

## 2020-01-09 RX ORDER — NITROGLYCERIN 0.4 MG/1
0.4 TABLET SUBLINGUAL
Qty: 1 BOTTLE | Refills: 1 | Status: SHIPPED | OUTPATIENT
Start: 2020-01-09 | End: 2020-05-21 | Stop reason: SDUPTHER

## 2020-02-20 ENCOUNTER — TELEPHONE (OUTPATIENT)
Dept: FAMILY MEDICINE CLINIC | Age: 65
End: 2020-02-20

## 2020-02-20 NOTE — TELEPHONE ENCOUNTER
Angeline Myers calling from about home [facility] and states open a case on her and wants to know if you want them to eval her PT/INR or will the office do this on her at next visit on 2/25. Please call her back at [phone].

## 2020-02-20 NOTE — TELEPHONE ENCOUNTER
Called and spoke with Angeline Myers. Advised that we would check INR at visit on 2/25/20.  Angeline Myers verbalized understanding

## 2020-02-25 ENCOUNTER — DOCUMENTATION ONLY (OUTPATIENT)
Dept: FAMILY MEDICINE CLINIC | Age: 65
End: 2020-02-25

## 2020-02-25 NOTE — PROGRESS NOTES
[employer] in [address] [place] [address] order was placed on Dr Matilde hunter to process.

## 2020-02-26 ENCOUNTER — DOCUMENTATION ONLY (OUTPATIENT)
Dept: FAMILY MEDICINE CLINIC | Age: 65
End: 2020-02-26

## 2020-02-27 ENCOUNTER — DOCUMENTATION ONLY (OUTPATIENT)
Dept: FAMILY MEDICINE CLINIC | Age: 65
End: 2020-02-27

## 2020-02-27 ENCOUNTER — TELEPHONE (OUTPATIENT)
Dept: FAMILY MEDICINE CLINIC | Age: 65
End: 2020-02-27

## 2020-02-27 NOTE — TELEPHONE ENCOUNTER
Val from [facility] would like a verbal order to check patients pt/inrwhen they visit her.  Please call her back at [phone]

## 2020-02-27 NOTE — TELEPHONE ENCOUNTER
Spoke with july, gave verbal order per provider. She states she already went and the pt's reading was  PT: 35.4 INR: 2.9. she states the pt is currently on 2.5mg daily.  I verbalized understanding

## 2020-02-27 NOTE — PROGRESS NOTES
Plan of Care faxed to [facility] in [address]. Fax number [phone] confirmation number [phone].

## 2020-03-02 ENCOUNTER — DOCUMENTATION ONLY (OUTPATIENT)
Dept: FAMILY MEDICINE CLINIC | Age: 65
End: 2020-03-02

## 2020-03-03 ENCOUNTER — TELEPHONE (OUTPATIENT)
Dept: FAMILY MEDICINE CLINIC | Age: 65
End: 2020-03-03

## 2020-03-03 ENCOUNTER — OFFICE VISIT (OUTPATIENT)
Dept: FAMILY MEDICINE CLINIC | Age: 65
End: 2020-03-03

## 2020-03-03 ENCOUNTER — DOCUMENTATION ONLY (OUTPATIENT)
Dept: FAMILY MEDICINE CLINIC | Age: 65
End: 2020-03-03

## 2020-03-03 VITALS
TEMPERATURE: 98.4 F | BODY MASS INDEX: 36.36 KG/M2 | DIASTOLIC BLOOD PRESSURE: 63 MMHG | WEIGHT: 254 LBS | HEIGHT: 70 IN | SYSTOLIC BLOOD PRESSURE: 124 MMHG | RESPIRATION RATE: 16 BRPM | OXYGEN SATURATION: 92 % | HEART RATE: 67 BPM

## 2020-03-03 DIAGNOSIS — Z79.01 ENCOUNTER FOR MONITORING COUMADIN THERAPY: Primary | ICD-10-CM

## 2020-03-03 DIAGNOSIS — Z51.81 ENCOUNTER FOR MONITORING COUMADIN THERAPY: Primary | ICD-10-CM

## 2020-03-03 DIAGNOSIS — L72.3 SEBACEOUS CYST OF RIGHT AXILLA: ICD-10-CM

## 2020-03-03 DIAGNOSIS — I20.9 ANGINA, CLASS III (HCC): ICD-10-CM

## 2020-03-03 DIAGNOSIS — R07.9 CHRONIC CHEST PAIN: ICD-10-CM

## 2020-03-03 DIAGNOSIS — G89.29 CHRONIC CHEST PAIN: ICD-10-CM

## 2020-03-03 DIAGNOSIS — Z51.81 MEDICATION MONITORING ENCOUNTER: ICD-10-CM

## 2020-03-03 LAB
INR BLD: 2.8
PT POC: 33.1 SECONDS
VALID INTERNAL CONTROL?: YES

## 2020-03-03 RX ORDER — OXYCODONE AND ACETAMINOPHEN 7.5; 325 MG/1; MG/1
1 TABLET ORAL
Qty: 90 TAB | Refills: 0 | Status: SHIPPED | OUTPATIENT
Start: 2020-03-03 | End: 2020-04-09 | Stop reason: SDUPTHER

## 2020-03-03 NOTE — PATIENT INSTRUCTIONS
Epidermoid Cyst: Care Instructions  Your Care Instructions  An epidermoid (say \"oa-smz-DYV-leticia\") cyst is a lump just under the skin. These cysts can form when a hair follicle becomes blocked. They are common in acne and may occur on the face, neck, back, and genitals. However, they can form anywhere on the body. These cysts are not cancer and do not lead to cancer. They tend not to hurt, but they can sometimes become swollen and painful. They also may break open (rupture) and cause scarring. These cysts sometimes do not cause problems and may not need treatment. If you have a cyst that is swollen and hurts, your doctor may inject it with a medicine to help it heal. But it is more likely that a painful cyst will need to be removed. Your doctor will give you a shot of numbing medicine and cut into the cyst to drain it or remove it. This makes the symptoms go away. Follow-up care is a key part of your treatment and safety. Be sure to make and go to all appointments, and call your doctor if you are having problems. It's also a good idea to know your test results and keep a list of the medicines you take. How can you care for yourself at home? · Do not squeeze the cyst or poke it with a needle to open it. This can cause swelling, redness, and infection. · Always have a doctor look at any new lumps you get to make sure that they are not serious. When should you call for help? Watch closely for changes in your health, and be sure to contact your doctor if:    · You have a fever, redness, or swelling after you get a shot of medicine in the cyst.     · You see or feel a new lump on your skin. Where can you learn more? Go to http://lindsay-kai.info/. Enter N805 in the search box to learn more about \"Epidermoid Cyst: Care Instructions. \"  Current as of: April 1, 2019  Content Version: 12.2  © 9706-3271 [employer].  Care instructions adapted under license by [employer] (which disclaims liability or warranty for this information). If you have questions about a medical condition or this instruction, always ask your healthcare professional. [address] any warranty or liability for your use of this information.

## 2020-03-03 NOTE — PROGRESS NOTES
Lee Carter is a 60 y.o. female   Chief Complaint   Patient presents with    Follow-up     INR check     pt states she has a boil and noticed it a month ago R axilla. No fevers or chills. States she has been squeezing on it but can't take the pain so has not gotten anything out of it. States has had thick white smelly material come out of it. States anytime it drained it then comes back. Pt with PE on chronic coumadin currently on 2.5mg daily. Currently therapeutic at 2.8. No changes. Pt requesting refill of  used for chronic anginal chest pain. Pain medication brings her pain from 7/10 and will bring it to a 4/10 and discussed changing dose and states anything higher than 7.5 makes her too cloudy. Pt shows no signs of abuse and no hx of drug or etoh abuse. Pt is due for drug screen, last oxy was 3 days ago. Will likely be negative.  last fill was 12/11/19.       Opioid/Pain Management:     1. Has the patient signed a pain contract for chronic narcotic use? yes  2. Has the patient had a UDS or Serum screen as per guidelines as per [facility]?:   yes    3. Does patient meet necessary guidelines for Naloxone treatement per the [facility]?:    no  4. Has the Prescription Monitoring Program been reviewed? yes  5. Does patient have a long term condition that requires long term use of a Narcotic?  yes  6. Has patient been tolerant of therapy and responsible to routine follow up and specialist follow-up? Yes    she is a 60y.o. year old female who presents for evalution. Reviewed PmHx, RxHx, FmHx, SocHx, AllgHx and updated and dated in the chart.     Review of Systems - negative except as listed above in the HPI    Objective:     Vitals:    03/03/20 1456   BP: 124/63   Pulse: 67   Resp: 16   Temp: 98.4 °F (36.9 °C)   TempSrc: Oral   SpO2: 92%   Weight: 254 lb (115.2 kg)   Height: 5' 10\" (1.778 m)       Current Outpatient Medications   Medication Sig    oxyCODONE-acetaminophen (PERCOCET 7.5) 7.5-325 mg per tablet Take 1 Tab by mouth every eight (8) hours as needed for Pain for up to 30 days. Max Daily Amount: 3 Tabs.  nitroglycerin (NITROSTAT) 0.4 mg SL tablet 1 Tab by SubLINGual route every five (5) minutes as needed for Chest Pain. Up to 3 doses.  diazePAM (VALIUM) 5 mg tablet 1 tab prior to procedure, do not take with opiate    naloxone (NARCAN) 4 mg/actuation nasal spray Use 1 spray intranasally, then discard. Repeat with new spray every 2 min as needed for opioid overdose symptoms, alternating nostrils.  amLODIPine (NORVASC) 10 mg tablet Take 1 Tab by mouth daily. Indications: high blood pressure    metoclopramide HCl (REGLAN) 10 mg tablet Take 1 Tab by mouth Before breakfast, lunch, dinner and at bedtime. Take 10 mg by mouth Before breakfast, lunch, dinner and at bedtime.  albuterol (PROAIR HFA) 90 mcg/actuation inhaler Take 2 Puffs by inhalation every four (4) hours as needed for Wheezing.  warfarin (COUMADIN) 2.5 mg tablet TAKE 1 TABLET BY MOUTH DAILY FOR LUNG EMBOLISM    sucralfate (CARAFATE) 1 gram tablet Take 1 Tab by mouth four (4) times daily.  atorvastatin (LIPITOR) 80 mg tablet Take 1 Tab by mouth nightly.  carvedilol (COREG) 25 mg tablet TAKE 1 AND 1/2 TABLET BY MOUTH TWO TIMES DAILY WITH MEALS    Oxygen O2 2L NC 24/7    allopurinol (ZYLOPRIM) 100 mg tablet TAKE 1 TABLET BY MOUTH EVERY DAY    isosorbide mononitrate ER (IMDUR) 120 mg CR tablet TAKE 1 TABLET BY MOUTH EVERY DAY    pantoprazole (PROTONIX) 40 mg tablet TAKE 1 TABLET BY MOUTH TWICE A DAY FOR HEARTBURN    ergocalciferol (VITAMIN D2) 50,000 unit capsule Take 50,000 Units by mouth every Tuesday.  insulin aspart protamine/insulin aspart (NOVOLOG MIX 70-30 U-100 INSULN) 100 unit/mL (70-30) injection 10-40 Units by SubCUTAneous route nightly as needed (BG > 160). Sliding Scale     No current facility-administered medications for this visit.         Physical Examination: General appearance - alert, well appearing, and in no distress  Mental status - alert, oriented to person, place, and time  Chest - clear to auscultation, no wheezes, rales or rhonchi, symmetric air entry  Heart - normal rate, regular rhythm, normal S1, S2, no murmurs, rubs, clicks or gallops      Assessment/ Plan:   Diagnoses and all orders for this visit:    1. Encounter for monitoring Coumadin therapy  -     AMB POC PT/INR  @ goal, HH is coming weekly and will have them check biweekly  2. Sebaceous cyst of right axilla  -     REFERRAL TO GENERAL SURGERY    3. Medication monitoring encounter  -     DRUG SCREEN-10 W/CONFIRM, SERUM; Future    4. Chronic chest pain  -     oxyCODONE-acetaminophen (PERCOCET 7.5) 7.5-325 mg per tablet; Take 1 Tab by mouth every eight (8) hours as needed for Pain for up to 30 days. Max Daily Amount: 3 Tabs. Pt to call for next 2 refills  5. Angina, class III (HCC)  -     oxyCODONE-acetaminophen (PERCOCET 7.5) 7.5-325 mg per tablet; Take 1 Tab by mouth every eight (8) hours as needed for Pain for up to 30 days. Max Daily Amount: 3 Tabs. Follow-up and Dispositions    · Return in about 3 months (around 6/3/2020), or if symptoms worsen or fail to improve. I have discussed the diagnosis with the patient and the intended plan as seen in the above orders. The patient has received an after-visit summary and questions were answered concerning future plans. Pt conveyed understanding of plan.     Medication Side Effects and Warnings were discussed with patient      [address] [facility] [address], DO

## 2020-03-03 NOTE — PROGRESS NOTES
Chief Complaint   Patient presents with    Follow-up     INR check      Patient presents in office today for INR check. Has c/o a boil under her right arm that is growing. No other concerns. 1. Have you been to the ER, urgent care clinic since your last visit? Hospitalized since your last visit? No    2. Have you seen or consulted any other health care providers outside of the [address] [place] [address] since your last visit? Include any pap smears or colon screening.  No      Learning Assessment 6/28/2019   PRIMARY LEARNER Patient   PRIMARY LANGUAGE ENGLISH   LEARNER PREFERENCE PRIMARY LISTENING   ANSWERED BY patient    RELATIONSHIP SELF

## 2020-03-03 NOTE — TELEPHONE ENCOUNTER
At [address], called. She stated that pt refused OT. An order will be coming over for OT to be omitted. Please call Edward Mederos at [phone] with any questions.

## 2020-03-05 ENCOUNTER — DOCUMENTATION ONLY (OUTPATIENT)
Dept: FAMILY MEDICINE CLINIC | Age: 65
End: 2020-03-05

## 2020-03-05 NOTE — PROGRESS NOTES
Order faxed to [facility] in [facility]. Fax number [phone] confirmation number [id number].

## 2020-03-07 DIAGNOSIS — M10.372 ACUTE GOUT DUE TO RENAL IMPAIRMENT INVOLVING LEFT FOOT: ICD-10-CM

## 2020-03-10 RX ORDER — ALLOPURINOL 100 MG/1
TABLET ORAL
Qty: 30 TAB | Refills: 0 | Status: SHIPPED | OUTPATIENT
Start: 2020-03-10 | End: 2020-03-17

## 2020-03-13 DIAGNOSIS — M10.372 ACUTE GOUT DUE TO RENAL IMPAIRMENT INVOLVING LEFT FOOT: ICD-10-CM

## 2020-03-17 PROBLEM — R63.0 LOSS OF APPETITE: Status: ACTIVE | Noted: 2020-03-17

## 2020-03-17 PROBLEM — R10.9 FLANK PAIN: Status: ACTIVE | Noted: 2020-03-17

## 2020-03-17 PROBLEM — Z99.2 END STAGE RENAL FAILURE ON DIALYSIS (HCC): Status: ACTIVE | Noted: 2020-03-17

## 2020-03-17 PROBLEM — M25.50 ARTHRALGIA: Status: ACTIVE | Noted: 2020-03-17

## 2020-03-17 PROBLEM — N18.6 END STAGE RENAL FAILURE ON DIALYSIS (HCC): Status: ACTIVE | Noted: 2020-03-17

## 2020-03-17 PROBLEM — R53.83 FATIGUE: Status: ACTIVE | Noted: 2020-03-17

## 2020-03-17 PROBLEM — R60.0 EDEMA OF BOTH LOWER EXTREMITIES: Status: ACTIVE | Noted: 2020-03-17

## 2020-03-17 PROBLEM — K02.9 INFECTED DENTAL CARIES: Status: ACTIVE | Noted: 2020-03-17

## 2020-03-17 PROBLEM — R09.02 HYPOXIA: Status: ACTIVE | Noted: 2020-03-17

## 2020-03-17 PROBLEM — T84.53XA INFECTION OF TOTAL RIGHT KNEE REPLACEMENT (HCC): Status: ACTIVE | Noted: 2020-03-17

## 2020-03-17 PROBLEM — R31.9 HEMATURIA: Status: ACTIVE | Noted: 2020-03-17

## 2020-03-17 PROBLEM — R11.2 NAUSEA AND VOMITING: Status: ACTIVE | Noted: 2020-03-17

## 2020-03-17 PROBLEM — E87.70 HYPERVOLEMIA: Status: ACTIVE | Noted: 2020-03-17

## 2020-03-17 PROBLEM — K04.7 INFECTED DENTAL CARIES: Status: ACTIVE | Noted: 2020-03-17

## 2020-03-17 PROBLEM — Z95.5 CORONARY STENT PATENT: Status: ACTIVE | Noted: 2020-03-17

## 2020-03-17 RX ORDER — ALLOPURINOL 100 MG/1
TABLET ORAL
Qty: 30 TAB | Refills: 0 | Status: SHIPPED | OUTPATIENT
Start: 2020-03-17 | End: 2020-04-07

## 2020-03-20 ENCOUNTER — TELEPHONE (OUTPATIENT)
Dept: FAMILY MEDICINE CLINIC | Age: 65
End: 2020-03-20

## 2020-03-20 DIAGNOSIS — K21.9 GASTROESOPHAGEAL REFLUX DISEASE, ESOPHAGITIS PRESENCE NOT SPECIFIED: ICD-10-CM

## 2020-03-20 NOTE — TELEPHONE ENCOUNTER
Called patient and advised per Dr. Kyree Ruiz to skip 1 dose and resume 2.5 MG daily. Advised to f/u in 1 month to recheck. Patient verbalized understanding.

## 2020-03-20 NOTE — TELEPHONE ENCOUNTER
Received call from [address] [place] [address] at home care regarding patient's PT/INR results. PT: 36.1  INR: 3.0    Taking 2.5 MG of coumadin daily. Nurse states that this is her last at home visit with patient so any further INR testing will need to be done elsewhere.  She can be reached at [phone]

## 2020-03-23 ENCOUNTER — DOCUMENTATION ONLY (OUTPATIENT)
Dept: FAMILY MEDICINE CLINIC | Age: 65
End: 2020-03-23

## 2020-03-23 RX ORDER — PANTOPRAZOLE SODIUM 40 MG/1
TABLET, DELAYED RELEASE ORAL
Qty: 60 TAB | Refills: 0 | Status: SHIPPED | OUTPATIENT
Start: 2020-03-23 | End: 2020-05-17

## 2020-03-24 ENCOUNTER — DOCUMENTATION ONLY (OUTPATIENT)
Dept: FAMILY MEDICINE CLINIC | Age: 65
End: 2020-03-24

## 2020-04-02 ENCOUNTER — TELEPHONE (OUTPATIENT)
Dept: FAMILY MEDICINE CLINIC | Age: 65
End: 2020-04-02

## 2020-04-02 NOTE — TELEPHONE ENCOUNTER
Pt calling and wants appt in the office only with Dr Celestino Peres to have her blood work done for INR. I explained to her that he is only doing the flu clinic outside the office and will send messge to the nurse about talking to him about getting appt with another provider in the office. She also states she wants her pain medication refilled. She can be reached at [phone].

## 2020-04-02 NOTE — TELEPHONE ENCOUNTER
Called and spoke with patient. Advised that Dr. Katerina Murillo is working the flu clinic and doing virtual visit. Did inform her that we have nurse practitioners that will be doing well visits. Offered her an apt for tomorrow however she states she has dialysis on Fridays. Advised her that next weeks schedule has no been release yet but to call back Monday and we can let her know when we can get her in. Patient verbalized understanding.

## 2020-04-06 ENCOUNTER — TELEPHONE (OUTPATIENT)
Dept: FAMILY MEDICINE CLINIC | Age: 65
End: 2020-04-06

## 2020-04-06 DIAGNOSIS — M10.372 ACUTE GOUT DUE TO RENAL IMPAIRMENT INVOLVING LEFT FOOT: ICD-10-CM

## 2020-04-06 RX ORDER — ISOSORBIDE MONONITRATE 120 MG/1
TABLET, EXTENDED RELEASE ORAL
Qty: 90 TAB | Refills: 1 | Status: SHIPPED | OUTPATIENT
Start: 2020-04-06 | End: 2020-04-15

## 2020-04-06 NOTE — TELEPHONE ENCOUNTER
Pt calling back to speak with the nurse about making her appt in the office with Dr John Brothers. See note from 4/2 from nurse. Pt states the nurse knows what she needs. She can be reached at [phone].

## 2020-04-06 NOTE — TELEPHONE ENCOUNTER
Requested Prescriptions     Pending Prescriptions Disp Refills    isosorbide mononitrate ER (IMDUR) 120 mg CR tablet [Pharmacy Med Name: Isosorbide Mononitrate  MG Oral Tablet Extended Release 24 Hour] 90 Tab 1     Sig: Take 1 tablet by mouth once daily     NOV 6/16/2020  JUNE Berrios

## 2020-04-07 RX ORDER — ALLOPURINOL 100 MG/1
TABLET ORAL
Qty: 30 TAB | Refills: 0 | OUTPATIENT
Start: 2020-04-07

## 2020-04-07 RX ORDER — ALLOPURINOL 100 MG/1
TABLET ORAL
Qty: 30 TAB | Refills: 11 | Status: SHIPPED | OUTPATIENT
Start: 2020-04-07 | End: 2020-04-14

## 2020-04-09 ENCOUNTER — OFFICE VISIT (OUTPATIENT)
Dept: FAMILY MEDICINE CLINIC | Age: 65
End: 2020-04-09

## 2020-04-09 VITALS
RESPIRATION RATE: 18 BRPM | BODY MASS INDEX: 36.36 KG/M2 | TEMPERATURE: 97.2 F | WEIGHT: 254 LBS | DIASTOLIC BLOOD PRESSURE: 66 MMHG | SYSTOLIC BLOOD PRESSURE: 134 MMHG | HEART RATE: 99 BPM | OXYGEN SATURATION: 96 % | HEIGHT: 70 IN

## 2020-04-09 DIAGNOSIS — Z51.81 ENCOUNTER FOR MONITORING COUMADIN THERAPY: Primary | ICD-10-CM

## 2020-04-09 DIAGNOSIS — Z51.81 ENCOUNTER FOR MEDICATION MONITORING: ICD-10-CM

## 2020-04-09 DIAGNOSIS — R07.9 CHRONIC CHEST PAIN: ICD-10-CM

## 2020-04-09 DIAGNOSIS — I20.9 ANGINA, CLASS III (HCC): ICD-10-CM

## 2020-04-09 DIAGNOSIS — G89.29 CHRONIC CHEST PAIN: ICD-10-CM

## 2020-04-09 DIAGNOSIS — Z79.01 ENCOUNTER FOR MONITORING COUMADIN THERAPY: Primary | ICD-10-CM

## 2020-04-09 RX ORDER — OXYCODONE AND ACETAMINOPHEN 7.5; 325 MG/1; MG/1
1 TABLET ORAL
Qty: 90 TAB | Refills: 0 | Status: SHIPPED | OUTPATIENT
Start: 2020-04-09 | End: 2020-05-08 | Stop reason: SDUPTHER

## 2020-04-09 NOTE — PROGRESS NOTES
Sophie Singh is a 60 y.o. female , id x 2(name and ). Reviewed record, history, and  medications. Chief Complaint   Patient presents with    Follow-up     ptinr follow up       Vitals:    20 1331   BP: 134/66   Pulse: 99   Resp: 18   Temp: 97.2 °F (36.2 °C)   TempSrc: Oral   SpO2: 96%   Weight: 254 lb (115.2 kg)   Height: 5' 10\" (1.778 m)   PainSc:   0 - No pain       Coordination of Care Questionnaire:   1) Have you been to an emergency room, urgent care, or hospitalized since your last visit?   no       2. Have seen or consulted any other health care provider since your last visit? NO      3 most recent PHQ Screens 3/3/2020   Little interest or pleasure in doing things Not at all   Feeling down, depressed, irritable, or hopeless Not at all   Total Score PHQ 2 0       Patient is accompanied by self I have received verbal consent from Sophie Singh to discuss any/all medical information while they are present in the room.

## 2020-04-09 NOTE — PROGRESS NOTES
Chief Complaint   Patient presents with    Follow-up     ptinr follow up     Melissa Wynne is a 60 y.o. female who presents for evaluation. Pt with PE on chronic coumadin currently on 2.5mg daily. Last visit 1 month ago she was therapeutic at 2.8. INR today 2.1       Also requests refill of pain medication for chronic chest pain. Also w/ chronic back and knee pain s/p surgery. Pt is on HD so unable to provide UDS, difficult stick.  checked and appropriate. On medication patient rates pain is 6-7/10, w/o medication pain is 10/10. Opioid/Pain Management:    1. Has the patient signed a pain contract for chronic narcotic use? yes  2. Has the patient had a UDS or Serum screen as per guidelines as per [facility]?:   yes    3. Does patient meet necessary guidelines for Naloxone treatement per the [facility]?: no  4. Has the Prescription Monitoring Program been reviewed? yes  5. Does patient have a long term condition that requires long term use of a Narcotic?  yes  6. Has patient been tolerant of therapy and responsible to routine follow up and specialist follow-up? Yes        Reviewed PmHx, RxHx, FmHx, SocHx, AllgHx and updated and dated in the chart.     Patient Active Problem List    Diagnosis    Arthralgia    Coronary stent patent    Edema of both lower extremities    Fatigue    Hematuria    Hypervolemia    Infected dental caries    Loss of appetite    End stage renal failure on dialysis (HCC)    Flank pain    Hypoxia    Nausea and vomiting    Infection of total right knee replacement (HCC)    Chronic anticoagulation    ESRD (end stage renal disease) ([address]    S/P ORIF (open reduction internal fixation) fracture    Nephrolithiasis    PVC (premature ventricular contraction)    (HFpEF) heart failure with preserved ejection fraction ([address]    ESRD on hemodialysis ([address]    Limited mobility    Hx of deep venous thrombosis    Diabetic gastroparesis ([address]    GERD (gastroesophageal reflux disease)    [facility]itis [facility]    DM type 2 causing neurological disease ([facility]    DM type 2 causing renal disease ([facility]    DM type 2 causing vascular disease ([facility]    Gout    S/P left pulmonary artery pressure sensor implant placement     Cardiomems       [facility]) [facility]    Warfarin anticoagulation    COPD, severe ([facility]    DM (diabetes mellitus), type 2 with renal complications (HCC)    Normocytic anemia    HTN (hypertension)    Morbid obesity    Gastroparesis    Pulmonary HTN    Coronary artery disease     [place] 2004  1v CAD by cath - PCI OM with SAL  Cath 5/2004 - patent stent, no new disease  Lexiscan cardiolite 12/09- normal perfusion, EF 66%  Cath: 3/19/12: PA 55/30 mean 41, wedge 26, RA 24, EDP 35, LVG 55%, LM normal, LAD normal, LCX - OM1 patent stent, OM2 prox 85% long, % with L to R collaterals         JAESN on CPAP     Dr. Rae Davis CPAP      Sleep apnea       Review of Systems - negative except as listed above in the HPI    Objective:     Vitals:    04/09/20 1331   BP: 134/66   Pulse: 99   Resp: 18   Temp: 97.2 °F (36.2 °C)   TempSrc: Oral   SpO2: 96%   Weight: 254 lb (115.2 kg)   Height: 5' 10\" (1.778 m)     Physical Examination: General appearance - alert, well appearing, and in no distress  Mental status - alert, oriented to person, place, and time  Eyes - pupils equal and reactive, extraocular eye movements intact  Chest - clear to auscultation, no wheezes, rales or rhonchi, symmetric air entry  Heart - normal rate, regular rhythm, normal S1, S2, no murmurs, rubs, clicks or gallops       Assessment/ Plan:   Diagnoses and all orders for this visit:    1.  Encounter for monitoring Coumadin therapy  -     AMB POC PT/INR   - INR therapeutic at 2.1, continue current med regimen  - Recheck INR 6 weeks (Approx May 21), advised that pt will likely need to get next INR at [facility] unless she is able to get it drawn during one of her HD sessions. Gets HD at [facility]. 2. Chronic chest pain  3. Angina, class III (HCC)  -     oxyCODONE-acetaminophen (PERCOCET 7.5) 7.5-325 mg per tablet; Take 1 Tab by mouth every eight (8) hours as needed for Pain for up to 30 days. Max Daily Amount: 3 Tabs. - Refilled X 1 month. - Opioid medications were continued as part of a comprehensive pain treatment. The patient demonstrates reduced pain and improved functionality provided by the opioid pain medication. Medication limitations and potential adverse effects were reviewed and discussed. There is no evidence of substance abuse disorder, opioid misuse, or diversion. The patient will be re-evaluated at regular intervals with ongoing drug testing and  monitoring. 4. Encounter for medication monitoring  -     DRUG SCREEN-10 W/CONFIRM, SERUM; Future       Follow-up and Dispositions    · Return in about 6 weeks (around 5/21/2020) for INR. I have discussed the diagnosis with the patient and the intended plan as seen in the above orders. The patient understands and agrees with the plan. The patient has received an after-visit summary and questions were answered concerning future plans. Medication Side Effects and Warnings were discussed with patient  Patient Labs were reviewed and or requested:  Patient Past Records were reviewed and or requested    Lenora Dia NP  [address] [facility]    There are no Patient Instructions on file for this visit.

## 2020-04-13 DIAGNOSIS — M10.372 ACUTE GOUT DUE TO RENAL IMPAIRMENT INVOLVING LEFT FOOT: ICD-10-CM

## 2020-04-14 RX ORDER — ALLOPURINOL 100 MG/1
TABLET ORAL
Qty: 30 TAB | Refills: 11 | Status: SHIPPED | OUTPATIENT
Start: 2020-04-14 | End: 2021-05-04

## 2020-04-15 RX ORDER — ISOSORBIDE MONONITRATE 120 MG/1
TABLET, EXTENDED RELEASE ORAL
Qty: 30 TAB | Refills: 0 | Status: SHIPPED | OUTPATIENT
Start: 2020-04-15 | End: 2020-05-18

## 2020-05-07 ENCOUNTER — TELEPHONE (OUTPATIENT)
Dept: FAMILY MEDICINE CLINIC | Age: 65
End: 2020-05-07

## 2020-05-07 ENCOUNTER — TELEPHONE (OUTPATIENT)
Dept: CARDIOLOGY CLINIC | Age: 65
End: 2020-05-07

## 2020-05-07 NOTE — TELEPHONE ENCOUNTER
Patient would like to schedule VV or OV with Dr Cuate Lerma aching under left breast     Phone: [phone]

## 2020-05-07 NOTE — TELEPHONE ENCOUNTER
This would be a pulmonary or radiology issue.  Happy to talk with her but she needs whoever was involved with previous procedure to address this

## 2020-05-07 NOTE — TELEPHONE ENCOUNTER
Patient called stated that she was instruction to come through the tent for INR today. However she is not able but will come on 5-8-2020.

## 2020-05-07 NOTE — TELEPHONE ENCOUNTER
PTIDx2 states having pain under left breast where piece of dialysis catheter was left in Left lung area. Denies SOB. Patient would like to know what this pain is due to it is becoming worse and is uncomfortable.     Siri Cabrera advise

## 2020-05-08 ENCOUNTER — TELEPHONE (OUTPATIENT)
Dept: FAMILY MEDICINE CLINIC | Age: 65
End: 2020-05-08

## 2020-05-08 ENCOUNTER — OFFICE VISIT (OUTPATIENT)
Dept: PRIMARY CARE CLINIC | Age: 65
End: 2020-05-08

## 2020-05-08 DIAGNOSIS — R07.9 CHRONIC CHEST PAIN: ICD-10-CM

## 2020-05-08 DIAGNOSIS — Z79.01 CHRONIC ANTICOAGULATION: ICD-10-CM

## 2020-05-08 DIAGNOSIS — G89.29 CHRONIC CHEST PAIN: ICD-10-CM

## 2020-05-08 DIAGNOSIS — Z86.718 HX OF DEEP VENOUS THROMBOSIS: Primary | ICD-10-CM

## 2020-05-08 DIAGNOSIS — I20.9 ANGINA, CLASS III (HCC): ICD-10-CM

## 2020-05-08 LAB
INR BLD: 1.7
PT POC: 20.5 SECONDS
VALID INTERNAL CONTROL?: YES

## 2020-05-08 RX ORDER — OXYCODONE AND ACETAMINOPHEN 7.5; 325 MG/1; MG/1
1 TABLET ORAL
Qty: 90 TAB | Refills: 0 | Status: SHIPPED | OUTPATIENT
Start: 2020-05-08 | End: 2020-06-19 | Stop reason: SDUPTHER

## 2020-05-08 NOTE — TELEPHONE ENCOUNTER
Called patient and ID X [id number]. Patient just seen in flu clinic and requested refill of pain medication for chronic angina.  reviewed and appropriate. Advised patient that I sent in 1 mo refill but will need to f/u in office or virtually for next refill. Pt verbalizes understanding.

## 2020-05-08 NOTE — PROGRESS NOTES
Anticoagulation   Patient here for followup of chronic anticoagulation. Indication: history of blood clots and a fib. Bleeding Signs/Symptoms: denies  INR's are drawn regularly and have been therapeutic. INR today is 1.7. Plan: Coumadin dosing - change to 5 mg Friday (tonight) and Tuesday. 2.5 mg all other days. Recheck INR in 1 week.

## 2020-05-17 DIAGNOSIS — K21.9 GASTROESOPHAGEAL REFLUX DISEASE, ESOPHAGITIS PRESENCE NOT SPECIFIED: ICD-10-CM

## 2020-05-17 RX ORDER — PANTOPRAZOLE SODIUM 40 MG/1
TABLET, DELAYED RELEASE ORAL
Qty: 60 TAB | Refills: 0 | Status: SHIPPED | OUTPATIENT
Start: 2020-05-17 | End: 2020-05-19

## 2020-05-19 ENCOUNTER — TELEPHONE (OUTPATIENT)
Dept: CARDIOLOGY CLINIC | Age: 65
End: 2020-05-19

## 2020-05-19 DIAGNOSIS — K21.9 GASTROESOPHAGEAL REFLUX DISEASE, ESOPHAGITIS PRESENCE NOT SPECIFIED: ICD-10-CM

## 2020-05-19 RX ORDER — PANTOPRAZOLE SODIUM 40 MG/1
TABLET, DELAYED RELEASE ORAL
Qty: 60 TAB | Refills: 0 | Status: SHIPPED | OUTPATIENT
Start: 2020-05-19 | End: 2020-06-29

## 2020-05-19 RX ORDER — ISOSORBIDE MONONITRATE 120 MG/1
TABLET, EXTENDED RELEASE ORAL
Qty: 90 TAB | Refills: 0 | Status: CANCELLED | OUTPATIENT
Start: 2020-05-19

## 2020-05-20 RX ORDER — ERGOCALCIFEROL 1.25 MG/1
50000 CAPSULE ORAL
Qty: 12 CAP | Refills: 3 | Status: SHIPPED | OUTPATIENT
Start: 2020-05-26 | End: 2021-04-25

## 2020-05-21 ENCOUNTER — OFFICE VISIT (OUTPATIENT)
Dept: CARDIOLOGY CLINIC | Age: 65
End: 2020-05-21

## 2020-05-21 VITALS
RESPIRATION RATE: 24 BRPM | HEIGHT: 70 IN | WEIGHT: 293 LBS | BODY MASS INDEX: 41.95 KG/M2 | SYSTOLIC BLOOD PRESSURE: 148 MMHG | HEART RATE: 78 BPM | DIASTOLIC BLOOD PRESSURE: 60 MMHG

## 2020-05-21 DIAGNOSIS — Z99.2 ESRD ON HEMODIALYSIS (HCC): ICD-10-CM

## 2020-05-21 DIAGNOSIS — N18.6 ESRD ON HEMODIALYSIS (HCC): ICD-10-CM

## 2020-05-21 DIAGNOSIS — I50.32 CHRONIC HEART FAILURE WITH PRESERVED EJECTION FRACTION (HCC): ICD-10-CM

## 2020-05-21 DIAGNOSIS — I10 ESSENTIAL HYPERTENSION: ICD-10-CM

## 2020-05-21 DIAGNOSIS — I49.3 PVC (PREMATURE VENTRICULAR CONTRACTION): ICD-10-CM

## 2020-05-21 DIAGNOSIS — E66.01 MORBID OBESITY (HCC): ICD-10-CM

## 2020-05-21 DIAGNOSIS — J44.9 COPD, SEVERE (HCC): ICD-10-CM

## 2020-05-21 DIAGNOSIS — Z79.01 CHRONIC ANTICOAGULATION: ICD-10-CM

## 2020-05-21 DIAGNOSIS — I25.118 CORONARY ARTERY DISEASE OF NATIVE ARTERY OF NATIVE HEART WITH STABLE ANGINA PECTORIS (HCC): Primary | ICD-10-CM

## 2020-05-21 DIAGNOSIS — R07.89 ATYPICAL CHEST PAIN: ICD-10-CM

## 2020-05-21 RX ORDER — ISOSORBIDE MONONITRATE 120 MG/1
TABLET, EXTENDED RELEASE ORAL
Qty: 30 TAB | Refills: 0 | Status: SHIPPED | OUTPATIENT
Start: 2020-05-21 | End: 2020-06-17

## 2020-05-21 RX ORDER — NITROGLYCERIN 0.4 MG/1
0.4 TABLET SUBLINGUAL
Qty: 1 BOTTLE | Refills: 1 | Status: SHIPPED | OUTPATIENT
Start: 2020-05-21 | End: 2022-08-25 | Stop reason: SDUPTHER

## 2020-05-21 NOTE — PATIENT INSTRUCTIONS
Please get a cardiomems reading today; I suspect you have excess fluid. If you do, I will work with your kidney doctor to try and get this off. If you continue to have chest pain, we will discuss chest x-ray as well as renexa next visit  Please follow-up with me in 2 weeks.

## 2020-05-21 NOTE — PROGRESS NOTES
Visit Vitals  /60   Pulse 78   Resp 24   Ht 5' 10\" (1.778 m)   Wt 297 lb (134.7 kg)   BMI 42.62 kg/m²     EKG today  Chest pain,dizziness

## 2020-05-21 NOTE — LETTER
5/21/20 Patient: Tejal Giang YOB: 1955 Date of Visit: 5/21/2020 Susi Pandya DO 
[address] [address] VIA In Basket Dear Susi Pandya DO, Thank you for referring Ms. Sola Padilla to [address] for evaluation. My notes for this consultation are attached. If you have questions, please do not hesitate to call me. I look forward to following your patient along with you.  
 
 
Sincerely, 
 
Susu Pina NP

## 2020-05-21 NOTE — PROGRESS NOTES
HARRIS Corona  [address]  [place], [address] [facility]    Office [phone]  Fax [phone]        Vivien Rivers is a 60 y.o. female last seen by Dr. Anh Parish in December. Assessment  Encounter Diagnoses   Name Primary?  Coronary artery disease of native artery of native heart with stable angina pectoris (HCC) Yes    Chronic heart failure with preserved ejection fraction (HCC)     Atypical chest pain     ESRD on hemodialysis (HCC)     Chronic anticoagulation     Morbid obesity ([address]     PVC (premature ventricular contraction)     COPD, severe (HCC)     Essential hypertension      Recommendations:  HFpEF, class 3 with moderate to severe PA HTN, s/p cardioMEMS. She has not had any recent readings. Volume is being managed by dialysis. She is no longer on diuretic therapy but still makes urine. Weight up (?19kg) and back on supplemental O2; requested pt send CardioMems reading later today. Has HD M/W/F at 10am.    Weight and BMI 5/21/2020 4/9/2020 3/3/2020 12/13/2019   Weight 134. 718 kg 115.214 kg 115.214 kg (No Data)     Weight and BMI 12/11/2019 11/14/2019   Weight 115.214 kg 136.533 kg     Chronic lung disease. Worsening LARA back on supplemental O2; breathing non-labored during exam but was unable to obtain pulse ox reading. Advised pt to follow-up with pulmonary in the near future. CAD, s/p PCI RCA  Feb 2019 by Dr. Nash Caceres. She had LCX 60%, negative FFR. Repeat cath at [address] [place] [address] in Sept apparently showed a new lesion, unable to PCI (no details available). She has no exertional angina, albeit at a very limited functional capacity.  She has increasing chest pains which are atypical; improved with nitro SL  - Continue Imdur 120mg/d, PRN SLNTG  - On warfarin, norvasc, coreg, and statin therapy (per pt, aspirin dc'd by nephro 2/2 anemia)  - consider trial of ranexa if pain ongoing; also rec f/u with pulmonary per above  - will request cath details from [address] [place] [address]    Symptomatic PVCs, largely controlled on Carvedilol. Holter Jan 2019 without AF    Chronic anemia due to ESRD procrit resistant    T2DM managed by Ayush SANDOVAL DO.    HTN, mildly elevated. Concern for volume overload, awaiting cardiomems reading per above. Morbid Obesity. BMI 42    ESRD on HD. Chronic anticoagulation due to previous DVT. Continue Coumadin. INR being followed by Dr. Em Titus. Reports good compliance with Warfarin. F/u in 2 weeks. Advised pt to remove artificial nails for hm pulse ox readings    Subjective:    Tonio Farmer is here for eval of chest pain. She complains of pain occurring daily with sharpness to her left chest under the breast.  Pain can last for 15-20 min and resolves with nitro SL. Uses nitro 3-4x per week. Pain is non-exertional.  Has associated nausea and headaches. Had previous chest port with piece remaining that can not be removed. Pt was told this should not cause any pain or breathing issues. She had a spell of dyspnea and chest pain in September and underwent cath at [address] [place] [address] with new lesion. Attempts at PCI unsuccessful. No change in her sx status since then. plavix was discontinued at that time for unclear reasons. She is adherent with dialysis on M/W/F and states she is below her 'dry weight'. However, pt feels she has excess fluid. She has been using O2 continue again for the past month. Has not seen pulmonary. Denies fever, cough, and congestion,  Denies wheeze. Has stable 5 pillow orthopnea with occasional PND. Is relatively inactive 2/2 joint pain. Patient denies any syncope or palpitations.     Cardiac testing  CATH: 2004: 1v CAD by cath - PCI OM with SAL  CATH 5/2004 - patent stent, no new disease   Lexiscan cardiolite 12/09- normal perfusion, EF 66%  ECHO 11/2009: LVH, EF 11%, diastolic dysfunction  STRESS/LEXISCAN/CARDIOLITE 3/29/11: normal perfusion, EF 62%  CATH: 3/20/2012 :PA 55/30 mean 41, wedge 26, RA 24, EDP 35, LVG 55%, LM normal, LAD normal, LCX - OM1 patent stent, OM2 prox 85% long, % w/ L -> R collateral; s/p SAL-> OM2/OM3 with SAL. CATH: 10/4/2012: EDP 25, EF 55%, LM nl, LAD mild plaque, LCX patent OM stents, % prox with good L to R collaterals. Cath 3/18/2014 - RA 9 PA 42/19/29, PCW 12, EDP 25, no LVG due to CKD, LM nl, LAD mild plaque, LCX patent stents OM1/OM2, RCA chronic proximal occlusion with L to R collaterals. ECHO 4/11/16: EF 60-65%. No WMA. LA mildly dilated. Lexiscan Cardiolite 4/11/16: Normal. LVEF 55%. Compared to 3/29/11, no significant changes. RHC/CardioMEMS implant 8/31/17 - RA 12, PA 56/20/30, CI ([place]te) 2.65    Echo 11/30/17 - EF 65%. G1DD. No WMA. Wall thickness was mildly to moderately increased. Limited Echo 12/13/17 - Tricuspid valve: Pulmonary artery diastolic pressure: 90.59 mmHg. [address] 6/5/18 Moderate-severe PA HTN, post capillary  Marked elevation in biV filling pressures    Event Monitor in 8/2018 demonstrated frequent PVCs    48 hour Holter monitor 2/5/19 - SB to ST 53 to 109, average HR 73. No AF/flutter. Echo 2/22/19 - EF 66-70%    Past Medical History:   Diagnosis Date     Sleep Apnea 2/16/2011    Compliant with c-pap.     Aortic aneurysm ([facility])     Abdominal    CAD (coronary Artery Disease) Native Artery 2/16/2011    Chronic kidney disease     Hemodiaylsis  3x/week    CKD (chronic kidney disease) stage 4, GFR 15-29 ml/min ([facility]) 2/10/2017    Coagulation disorder ([address] 06/2018    Anemia  Last blood Transfusion    COPD (chronic obstructive pulmonary disease) ([facility])     Diabetic gastroparesis ([facility])     Diastolic heart failure ([address] 10/5/2012    DM type 2 causing neurological disease ([facility]     DM type 2 causing renal disease (HCC)     Insulin SS at night only PRN    DM type 2 causing vascular disease ([address]     Esophageal stricture 2012    dialted Dr. Pippa Ford G6PD deficiency     trait    GERD (gastroesophageal reflux disease)     Gout     Hemodialysis access, AV graft ([facility] 04/02/2017    Dialysis MWF    [facility] [address]  Hypertension     ILD (interstitial lung disease) ([facility]     open lung bx CJW 2010    Morbid obesity ([facility]     OA (osteoarthritis)     Ovarian cancer (HCC)     cervical and uterine    Rheumatoid arteritis (HCC)     S/P left pulmonary artery pressure sensor implant placement 8/31/2017    Cardiomems     Thromboembolus ([facility]     Right calf     Tobacco use disorder 3/21/2012    Uterine cervix cancer (HCC)     Vitamin D deficiency 8/9/2013        Current Outpatient Medications   Medication Sig Dispense Refill    isosorbide mononitrate ER (IMDUR) 120 mg CR tablet Take 1 tablet by mouth once daily 30 Tab 0    nitroglycerin (NITROSTAT) 0.4 mg SL tablet 1 Tab by SubLINGual route every five (5) minutes as needed for Chest Pain. Up to 3 doses. 1 Bottle 1    [START ON 5/26/2020] ergocalciferol (Vitamin D2) 1,250 mcg (50,000 unit) capsule Take 1 Cap by mouth every Tuesday. 12 Cap 3    pantoprazole (PROTONIX) 40 mg tablet TAKE 1 TABLET BY MOUTH TWICE DAILY FOR HEARTBURN 60 Tab 0    oxyCODONE-acetaminophen (PERCOCET 7.5) 7.5-325 mg per tablet Take 1 Tab by mouth every eight (8) hours as needed for Pain for up to 30 days. Max Daily Amount: 3 Tabs. 90 Tab 0    allopurinoL (ZYLOPRIM) 100 mg tablet Take 1 tablet by mouth once daily 30 Tab 11    naloxone (NARCAN) 4 mg/actuation nasal spray Use 1 spray intranasally, then discard. Repeat with new spray every 2 min as needed for opioid overdose symptoms, alternating nostrils. 1 Each 5    amLODIPine (NORVASC) 10 mg tablet Take 1 Tab by mouth daily. Indications: high blood pressure 90 Tab 3    albuterol (PROAIR HFA) 90 mcg/actuation inhaler Take 2 Puffs by inhalation every four (4) hours as needed for Wheezing.  1 Inhaler 5    warfarin (COUMADIN) 2.5 mg tablet TAKE 1 TABLET BY MOUTH DAILY FOR LUNG EMBOLISM 90 Tab 3    sucralfate (CARAFATE) 1 gram tablet Take 1 Tab by mouth four (4) times daily. 120 Tab 11    atorvastatin (LIPITOR) 80 mg tablet Take 1 Tab by mouth nightly. 90 Tab 3    carvedilol (COREG) 25 mg tablet TAKE 1 AND 1/2 TABLET BY MOUTH TWO TIMES DAILY WITH MEALS 270 Tab 3    Oxygen O2 2L NC 24/7      insulin aspart protamine/insulin aspart (NOVOLOG MIX 70-30 U-100 INSULN) 100 unit/mL (70-30) injection 10-40 Units by SubCUTAneous route nightly as needed (BG > 160). Sliding Scale         Allergies   Allergen Reactions    Contrast Dye [Iodine] Anaphylaxis     Tolerates when pre medicated w/ IV solumedrol and benadryl prior to procedure     Levaquin [Levofloxacin] Nausea and Vomiting    Morphine Hives and Itching      Review of Systems  Constitutional: Negative for fever, chills, and diaphoresis. Respiratory: Negative for cough, hemoptysis, sputum production, and wheezing. +LARA  Cardiovascular: Negative for claudication. +chest pain/pressure, palpitations  Gastrointestinal: Negative for heartburn, nausea, vomiting, blood in stool and melena. +GERD  Genitourinary: Negative for dysuria and flank pain. Musculoskeletal: Negative for joint pain and back pain. +right knee pain  Neurological: Negative for focal weakness, seizures, loss of consciousness,  Endo/Heme/Allergies: Negative for abnormal bleeding  Psychiatric/Behavioral: Negative for memory loss. Physical Exam:  Visit Vitals  /60   Pulse 78   Resp 24   Ht 5' 10\" (1.778 m)   Wt 297 lb (134.7 kg)   BMI 42.62 kg/m²     General - well developed well nourished, morbidly obese, in wheelchair  Neck - JVP difficult to assess, thick neck  Cardiac - RRR, distant heart sounds  Vascular - radials pulses equal bilateral  Lungs - crackles LLE; otherwise clear, no wheezing  Abd - protuberant, non-distended  Extremities - 1-2+ pedal edema and pre-tibial edema.    Skin - no rash  Neuro - nonfocal  Psych - normal mood and affect    Cardiographics  Event Monitor in 8/2018 demonstrated frequent PVCs  Echo 2/22/19 - EF 66-70%  EKG 12/13/19 - SR 89, , unchanged from 2/21/19   EKG 5/21/20 - SR occ PVC, , unchanged from prior    Tasha Lara NP

## 2020-05-22 RX ORDER — RANOLAZINE 500 MG/1
500 TABLET, EXTENDED RELEASE ORAL 2 TIMES DAILY
Qty: 60 TAB | Refills: 0 | Status: SHIPPED | OUTPATIENT
Start: 2020-05-22 | End: 2020-06-04 | Stop reason: SINTOL

## 2020-05-22 NOTE — PROGRESS NOTES
Addendum 5/22: PAd 23 at goal (Threshold 20-25). Reviewed with pt. Advised to f/u with pulmonary early next week; will trial Ranexa 500mg BID for CP.

## 2020-05-26 ENCOUNTER — DOCUMENTATION ONLY (OUTPATIENT)
Dept: SURGERY | Age: 65
End: 2020-05-26

## 2020-06-01 ENCOUNTER — OFFICE VISIT (OUTPATIENT)
Dept: PRIMARY CARE CLINIC | Age: 65
End: 2020-06-01

## 2020-06-01 DIAGNOSIS — I48.20 CHRONIC ATRIAL FIBRILLATION (HCC): Primary | ICD-10-CM

## 2020-06-01 DIAGNOSIS — Z86.718 HX OF DEEP VENOUS THROMBOSIS: ICD-10-CM

## 2020-06-01 DIAGNOSIS — Z79.01 LONG TERM CURRENT USE OF ANTICOAGULANTS WITH INR GOAL OF 2.0-3.0: ICD-10-CM

## 2020-06-01 LAB
INR BLD: 3.4
PT POC: 40.4 SECONDS
VALID INTERNAL CONTROL?: YES

## 2020-06-01 NOTE — PROGRESS NOTES
See scanned note  Verbal consent obtained to treat and bill for services    Results for orders placed or performed in visit on 06/01/20   AMB POC PT/INR   Result Value Ref Range    VALID INTERNAL CONTROL POC Yes     Prothrombin time (POC) 40.4 seconds    INR POC 3.4        A/P:    1. Chronic atrial fibrillation (HCC)  -     AMB POC PT/INR    2. Hx of deep venous thrombosis  -     AMB POC PT/INR    3. Long term current use of anticoagulants with INR goal of 2.0-3.0  Above goal  Modification to warfarin dosing schedule: reduce to 2.5 mg daily  Next INR in 2 weeks.     Teo Salazar NP  06/01/20

## 2020-06-04 ENCOUNTER — VIRTUAL VISIT (OUTPATIENT)
Dept: CARDIOLOGY CLINIC | Age: 65
End: 2020-06-04

## 2020-06-04 DIAGNOSIS — R07.89 ATYPICAL CHEST PAIN: Primary | ICD-10-CM

## 2020-06-04 NOTE — PROGRESS NOTES
Pt driving during VV time; will cx appt. States she couldn't tolerate Ranexa 2/2 feeling very jittery. Still with LARA (worse than baseline) but saw pulmonary as advised this morning. Will discuss further in 2 weeks with Dr. Perales Else or call earlier if needed.

## 2020-06-15 ENCOUNTER — OFFICE VISIT (OUTPATIENT)
Dept: PRIMARY CARE CLINIC | Age: 65
End: 2020-06-15

## 2020-06-15 DIAGNOSIS — Z79.01 LONG TERM CURRENT USE OF ANTICOAGULANTS WITH INR GOAL OF 2.0-3.0: Primary | ICD-10-CM

## 2020-06-15 LAB
INR BLD: 2.3
PT POC: 0 SECONDS
VALID INTERNAL CONTROL?: YES

## 2020-06-15 NOTE — PROGRESS NOTES
Anticoagulation  Patient here for followup of chronic anticoagulation. Indication: Atrial Fibrillation. Bleeding Signs/Symptoms:  None. INR's are drawn regularly and have been Therapeutic. INR today is  2.3.     Plan: Coumadin dosing-   no change     Recheck INR in 4 week(s)  HARRIS Ward

## 2020-06-16 ENCOUNTER — OFFICE VISIT (OUTPATIENT)
Dept: CARDIOLOGY CLINIC | Age: 65
End: 2020-06-16

## 2020-06-16 VITALS
HEIGHT: 70 IN | SYSTOLIC BLOOD PRESSURE: 110 MMHG | DIASTOLIC BLOOD PRESSURE: 62 MMHG | WEIGHT: 293 LBS | BODY MASS INDEX: 41.95 KG/M2 | HEART RATE: 72 BPM

## 2020-06-16 DIAGNOSIS — I49.3 PVC (PREMATURE VENTRICULAR CONTRACTION): ICD-10-CM

## 2020-06-16 DIAGNOSIS — G47.33 OSA ON CPAP: Chronic | ICD-10-CM

## 2020-06-16 DIAGNOSIS — Z99.2 END STAGE RENAL FAILURE ON DIALYSIS (HCC): ICD-10-CM

## 2020-06-16 DIAGNOSIS — Z95.9 S/P LEFT PULMONARY ARTERY PRESSURE SENSOR IMPLANT PLACEMENT: ICD-10-CM

## 2020-06-16 DIAGNOSIS — E11.22 TYPE 2 DIABETES MELLITUS WITH CHRONIC KIDNEY DISEASE ON CHRONIC DIALYSIS, WITHOUT LONG-TERM CURRENT USE OF INSULIN (HCC): ICD-10-CM

## 2020-06-16 DIAGNOSIS — Z99.89 OSA ON CPAP: Chronic | ICD-10-CM

## 2020-06-16 DIAGNOSIS — I25.118 CORONARY ARTERY DISEASE OF NATIVE ARTERY OF NATIVE HEART WITH STABLE ANGINA PECTORIS (HCC): Primary | ICD-10-CM

## 2020-06-16 DIAGNOSIS — R07.89 OTHER CHEST PAIN: ICD-10-CM

## 2020-06-16 DIAGNOSIS — G47.33 OBSTRUCTIVE SLEEP APNEA SYNDROME: ICD-10-CM

## 2020-06-16 DIAGNOSIS — J44.9 COPD, SEVERE (HCC): ICD-10-CM

## 2020-06-16 DIAGNOSIS — N18.6 TYPE 2 DIABETES MELLITUS WITH CHRONIC KIDNEY DISEASE ON CHRONIC DIALYSIS, WITHOUT LONG-TERM CURRENT USE OF INSULIN (HCC): ICD-10-CM

## 2020-06-16 DIAGNOSIS — Z99.2 TYPE 2 DIABETES MELLITUS WITH CHRONIC KIDNEY DISEASE ON CHRONIC DIALYSIS, WITHOUT LONG-TERM CURRENT USE OF INSULIN (HCC): ICD-10-CM

## 2020-06-16 DIAGNOSIS — I50.32 CHRONIC HEART FAILURE WITH PRESERVED EJECTION FRACTION (HCC): ICD-10-CM

## 2020-06-16 DIAGNOSIS — I27.20 PULMONARY HYPERTENSION (HCC): ICD-10-CM

## 2020-06-16 DIAGNOSIS — N18.6 END STAGE RENAL FAILURE ON DIALYSIS (HCC): ICD-10-CM

## 2020-06-16 DIAGNOSIS — J84.9 ILD (INTERSTITIAL LUNG DISEASE) (HCC): ICD-10-CM

## 2020-06-16 NOTE — PROGRESS NOTES
Ed Moulton is a 60 y.o. female    Chief Complaint   Patient presents with    Follow-up     6 month f/u    Coronary Artery Disease    CHF    Hypertension       Chest pain patient states some Chest pain due to pulmonary issue    SOB patient states some SOB with chest pain    Dizziness No    Swelling patient states some swelling in her legs    Refills No    Visit Vitals  /62 (BP 1 Location: Left arm, BP Patient Position: Sitting)   Pulse 72   Ht 5' 10\" (1.778 m)   Wt 293 lb (132.9 kg)   BMI 42.04 kg/m²       1. Have you been to the ER, urgent care clinic since your last visit? Hospitalized since your last visit? No    2. Have you seen or consulted any other health care providers outside of the [address] [place] [address] since your last visit? Include any pap smears or colon screening.   No

## 2020-06-16 NOTE — PROGRESS NOTES
Nora Hurst MD [facility]  [id number], [facility], [id number] [place] [address]    Office [phone]  Fax [phone]  Cell [phone]      Gretchen Banegas is a 60 y.o. female last seen by me 6 months ago. Assessment  Encounter Diagnoses   Name Primary?  Coronary artery disease of native artery of native heart with stable angina pectoris (HCC) Yes    End stage renal failure on dialysis (HCC)     Chronic heart failure with preserved ejection fraction (HCC)     Other chest pain     S/P left pulmonary artery pressure sensor implant placement     Pulmonary hypertension (HCC)     Type 2 diabetes mellitus with chronic kidney disease on chronic dialysis, without long-term current use of insulin (HCC)     ILD (interstitial lung disease) (HCC)     Obstructive sleep apnea syndrome     COPD, severe ([facility]     JASEN on CPAP     PVC (premature ventricular contraction)      Recommendations:    Pleuritic left chest pain, awaiting CT evaluation thru Dr Asia Motley. Previous tip of dialysis catheter remnant maybe in PA? HFpEF, class 3b with moderate to severe PA HTN, s/p cardioMEMS. Breathing status seems worse. She has not had any recent readings. Volume is being managed by dialysis. She is no longer on diuretic therapy. Chronic lung disease, now oxygen dependent. CAD, s/p PCI RCA  Feb 2019 by Dr. Nathalia Song. She had LCX 60%, negative FFR. Repeat cath at [facility] in Sept apparently showed a new lesion, unable to PCI (no details available). She has chronic atypical pattern of angina, nitrate responsive.   - Continue Imdur 120mg/d, PRN SLNTG     Symptomatic PVCs, largely controlled on Carvedilol. Holter Jan 2019 without AF    Chronic anemia due to ESRD procrit resistant    T2DM managed by Reshma Flores DO. No longer on insulin    HTN, controlled on combination Rx. Morbid Obesity. ESRD on HD. Thrice weekly. Dr Terese Olszewski    Chronic anticoagulation due to previous DVT. Continue Coumadin. INR being followed by Dr. John Brothers. Follow-up and Dispositions    · Return in about 6 months (around 12/16/2020). Subjective:    Renato Angel worsening LARA, now on oxygen continuously. Has left pleuritic chest pain under left breast. Seen by Dr Lucina Kimball from pulmonary with plans for CT imaging near future. Chronic anginal chest pain, atypical pattern, nitrate responsive. Cannot walk 10 steps without becoming dyspneic. No cough or wheezing. No fever or chills. Largely wheelchair bound    Dialysis going fine. BP normal    Appetite fair. No longer on insulin. Cardiac risk factors   HTN yes  DM yes    Cardiac testing  CATH: 2004: 1v CAD by cath - PCI OM with SAL  CATH 5/2004 - patent stent, no new disease   Lexiscan cardiolite 12/09- normal perfusion, EF 66%  ECHO 11/2009: LVH, EF 85%, diastolic dysfunction  STRESS/LEXISCAN/CARDIOLITE 3/29/11: normal perfusion, EF 62%  CATH: 3/20/2012 :PA 55/30 mean 41, wedge 26, RA 24, EDP 35, LVG 55%, LM normal, LAD normal, LCX - OM1 patent stent, OM2 prox 85% long, % w/ L -> R collateral; s/p SAL-> OM2/OM3 with SAL. CATH: 10/4/2012: EDP 25, EF 55%, LM nl, LAD mild plaque, LCX patent OM stents, % prox with good L to R collaterals. Cath 3/18/2014 - RA 9 PA 42/19/29, PCW 12, EDP 25, no LVG due to CKD, LM nl, LAD mild plaque, LCX patent stents OM1/OM2, RCA chronic proximal occlusion with L to R collaterals. ECHO 4/11/16: EF 60-65%. No WMA. LA mildly dilated. Lexiscan Cardiolite 4/11/16: Normal. LVEF 55%. Compared to 3/29/11, no significant changes. RHC/CardioMEMS implant 8/31/17 - RA 12, PA 56/20/30, CI (Mayte) 2.65    Echo 11/30/17 - EF 65%. G1DD. No WMA. Wall thickness was mildly to moderately increased. Limited Echo 12/13/17 - Tricuspid valve: Pulmonary artery diastolic pressure: 87.36 mmHg.   [address] 6/5/18 Moderate-severe PA HTN, post capillary  Marked elevation in biV filling pressures    Event Monitor in 8/2018 demonstrated frequent PVCs    48 hour Holter monitor 2/5/19 - SB to ST 53 to 109, average HR 73. No AF/flutter. Echo 2/22/19 - EF 66-70%    Past Medical History:   Diagnosis Date     Sleep Apnea 2/16/2011    Compliant with c-pap.  Aortic aneurysm (HCC)     Abdominal    CAD (coronary Artery Disease) Native Artery 2/16/2011    Chronic kidney disease     Hemodiaylsis  3x/week    CKD (chronic kidney disease) stage 4, GFR 15-29 ml/min ([facility]) 2/10/2017    Coagulation disorder ([address] 06/2018    Anemia  Last blood Transfusion    COPD (chronic obstructive pulmonary disease) ([facility])     Diabetic gastroparesis ([facility])     Diastolic heart failure ([address] 10/5/2012    DM type 2 causing neurological disease ([address]     DM type 2 causing renal disease ([facility])     Insulin SS at night only PRN    DM type 2 causing vascular disease ([address]     Esophageal stricture 2012    dialted Dr. Rucker Iron G6PD deficiency     trait    GERD (gastroesophageal reflux disease)     Gout     Hemodialysis access, AV graft ([address] 04/02/2017    Dialysis MWF    [address]  Hypertension     ILD (interstitial lung disease) ([address]     open lung bx CJW 2010    Morbid obesity ([address]     OA (osteoarthritis)     Ovarian cancer (HCC)     cervical and uterine    Rheumatoid arteritis (HCC)     S/P left pulmonary artery pressure sensor implant placement 8/31/2017    Cardiomems     Thromboembolus ([address]     Right calf     Tobacco use disorder 3/21/2012    Uterine cervix cancer (HCC)     Vitamin D deficiency 8/9/2013        Current Outpatient Medications   Medication Sig Dispense Refill    docusate sodium (STOOL SOFTENER PO) Take  by mouth daily.  isosorbide mononitrate ER (IMDUR) 120 mg CR tablet Take 1 tablet by mouth once daily 30 Tab 0    nitroglycerin (NITROSTAT) 0.4 mg SL tablet 1 Tab by SubLINGual route every five (5) minutes as needed for Chest Pain. Up to 3 doses.  1 Bottle 1    ergocalciferol (Vitamin D2) 1,250 mcg (50,000 unit) capsule Take 1 Cap by mouth every Tuesday. 12 Cap 3    pantoprazole (PROTONIX) 40 mg tablet TAKE 1 TABLET BY MOUTH TWICE DAILY FOR HEARTBURN 60 Tab 0    allopurinoL (ZYLOPRIM) 100 mg tablet Take 1 tablet by mouth once daily 30 Tab 11    amLODIPine (NORVASC) 10 mg tablet Take 1 Tab by mouth daily. Indications: high blood pressure 90 Tab 3    albuterol (PROAIR HFA) 90 mcg/actuation inhaler Take 2 Puffs by inhalation every four (4) hours as needed for Wheezing. 1 Inhaler 5    warfarin (COUMADIN) 2.5 mg tablet TAKE 1 TABLET BY MOUTH DAILY FOR LUNG EMBOLISM 90 Tab 3    sucralfate (CARAFATE) 1 gram tablet Take 1 Tab by mouth four (4) times daily. 120 Tab 11    atorvastatin (LIPITOR) 80 mg tablet Take 1 Tab by mouth nightly. 90 Tab 3    carvedilol (COREG) 25 mg tablet TAKE 1 AND 1/2 TABLET BY MOUTH TWO TIMES DAILY WITH MEALS 270 Tab 3    Oxygen O2 2L NC 24/7      naloxone (NARCAN) 4 mg/actuation nasal spray Use 1 spray intranasally, then discard. Repeat with new spray every 2 min as needed for opioid overdose symptoms, alternating nostrils. 1 Each 5       Allergies   Allergen Reactions    Contrast Dye [Iodine] Anaphylaxis     Tolerates when pre medicated w/ IV solumedrol and benadryl prior to procedure     Levaquin [Levofloxacin] Nausea and Vomiting    Morphine Hives and Itching      Review of Systems  Constitutional: Negative for fever, chills, malaise/fatigue and diaphoresis. Respiratory: Negative for cough, hemoptysis, sputum production, and wheezing. +LARA  Cardiovascular: Negative for orthopnea, claudication, leg swelling and PND. +chest pain/pressure, palpitations  Gastrointestinal: Negative for heartburn, nausea, vomiting, blood in stool and melena. +GERD  Genitourinary: Negative for dysuria and flank pain. Musculoskeletal: Negative for joint pain and back pain. +right knee pain  Skin: Negative for rash.  +pressure sores buttocks  Neurological: Negative for focal weakness, seizures, loss of consciousness, weakness and headaches. Endo/Heme/Allergies: Does not bruise/bleed easily. Psychiatric/Behavioral: Negative for memory loss. The patient does not have insomnia. Physical Exam:  Visit Vitals  /62 (BP 1 Location: Left arm, BP Patient Position: Sitting)   Pulse 72   Ht 5' 10\" (1.778 m)   Wt 293 lb (132.9 kg)   BMI 42.04 kg/m²     Wt Readings from Last 3 Encounters:   06/16/20 293 lb (132.9 kg)   05/21/20 297 lb (134.7 kg)   04/09/20 254 lb (115.2 kg)      General - well developed well nourished, morbidly obese BF  Neck - JVP difficult to assess, thyroid nl  Cardiac - normal S1, S2, no murmurs, rubs or gallops. No clicks  Vascular - carotids without bruits, radials, femorals and pedal pulses equal bilateral  Lungs - clear to auscultation bilaterals, no rales, wheezing or rhonchi  Abd - soft nontender, no HSM, no abd bruits  Extremities - 1-2+ pedal edema and pre-tibial edema.    Skin - no rash  Neuro - nonfocal  Psych - normal mood and affect    Cardiographics  Event Monitor in 8/2018 demonstrated frequent PVCs  Echo 2/22/19 - EF 66-70%  EKG 12/13/19 - SR 89, , unchanged from 2/21/19             Colletta Chris, MD

## 2020-06-17 RX ORDER — ISOSORBIDE MONONITRATE 120 MG/1
TABLET, EXTENDED RELEASE ORAL
Qty: 90 TAB | Refills: 1 | Status: SHIPPED | OUTPATIENT
Start: 2020-06-17 | End: 2020-12-09

## 2020-06-17 NOTE — TELEPHONE ENCOUNTER
Requested Prescriptions     Signed Prescriptions Disp Refills    isosorbide mononitrate ER (IMDUR) 120 mg CR tablet 90 Tab 1     Sig: Take 1 tablet by mouth once daily     Authorizing Provider: Ramírez Dutta     Ordering User: TAO SALMON

## 2020-06-19 ENCOUNTER — VIRTUAL VISIT (OUTPATIENT)
Dept: FAMILY MEDICINE CLINIC | Age: 65
End: 2020-06-19

## 2020-06-19 DIAGNOSIS — M54.50 CHRONIC BILATERAL LOW BACK PAIN, UNSPECIFIED WHETHER SCIATICA PRESENT: ICD-10-CM

## 2020-06-19 DIAGNOSIS — M25.561 CHRONIC PAIN OF RIGHT KNEE: Primary | ICD-10-CM

## 2020-06-19 DIAGNOSIS — G89.29 CHRONIC BILATERAL LOW BACK PAIN, UNSPECIFIED WHETHER SCIATICA PRESENT: ICD-10-CM

## 2020-06-19 DIAGNOSIS — G89.29 CHRONIC PAIN OF RIGHT KNEE: Primary | ICD-10-CM

## 2020-06-19 RX ORDER — OXYCODONE AND ACETAMINOPHEN 7.5; 325 MG/1; MG/1
1 TABLET ORAL
Qty: 90 TAB | Refills: 0 | Status: SHIPPED | OUTPATIENT
Start: 2020-06-19 | End: 2020-09-15 | Stop reason: SDUPTHER

## 2020-06-19 NOTE — PROGRESS NOTES
Natalie Lazo is a 60 y.o. female who was seen by synchronous (real-time) audio-video technology on 6/19/2020. Consent: Natalie Lazo, who was seen by synchronous (real-time) audio-video technology, and/or her healthcare decision maker, is aware that this patient-initiated, Telehealth encounter on 6/19/2020 is a billable service, with coverage as determined by her insurance carrier. She is aware that she may receive a bill and has provided verbal consent to proceed: Yes. Assessment & Plan:   Diagnoses and all orders for this visit:    1. Chronic pain of right knee / 2. Chronic bilateral low back pain, unspecified whether sciatica present  -     oxyCODONE-acetaminophen (PERCOCET 7.5) 7.5-325 mg per tablet; Take 1 Tab by mouth every eight (8) hours as needed for Pain for up to 30 days. Max Daily Amount: 3 Tabs. -     oxyCODONE-acetaminophen (PERCOCET 7.5) 7.5-325 mg per tablet; Take 1 Tab by mouth every eight (8) hours as needed for Pain for up to 30 days. Max Daily Amount: 3 Tabs. Please fill on or after 7/19/20. Refilled rx. Reinforced SEs/ADRs of medication and how to take responsibly. Will need to be seen in office for next med refill.  reviewed. See HPI for more detailed information. I spent at least 15 minutes on this visit with this established patient. [id number]  Subjective:   Natalie Lazo is a 60 y.o. female who was seen for Medication Refill    Pt requesting refill of her pain medication. Chronic pain medication evaluation:  Origin of pain: low back pain and knee  Intensity: can go to a 10/10. Pt has been on pain medication for her back for many years. Has had multiple surgeries on the right knee. Current treatment of pain: Pt receives percocet from Dr. Екатерина Kennedy for this. Taking TID for her pain. Previous treatment for pain: patient has previously tried and failed multiple different medications.    Problem List:     Patient Active Problem List    Diagnosis Date Noted  Arthralgia 03/17/2020    Coronary stent patent 03/17/2020    Edema of both lower extremities 03/17/2020    Fatigue 03/17/2020    Hematuria 03/17/2020    Hypervolemia 03/17/2020    Infected dental caries 03/17/2020    Loss of appetite 03/17/2020    End stage renal failure on dialysis ([address] 03/17/2020    Flank pain 03/17/2020    Hypoxia 03/17/2020    Nausea and vomiting 03/17/2020    Infection of total right knee replacement ([address] 03/17/2020    Chronic anticoagulation 12/13/2019    ESRD (end stage renal disease) ([address] 05/28/2019    S/P ORIF (open reduction internal fixation) fracture 05/24/2019    Nephrolithiasis 03/20/2019    PVC (premature ventricular contraction) 03/20/2019    (HFpEF) heart failure with preserved ejection fraction ([address] 09/23/2018    ESRD on hemodialysis ([address] 06/23/2018    Limited mobility 06/21/2018    Hx of deep venous thrombosis 05/30/2018    Diabetic gastroparesis (HCC)     GERD (gastroesophageal reflux disease)     Rheumatoid arteritis ([address]     DM type 2 causing neurological disease ([address]     DM type 2 causing renal disease ([address]     DM type 2 causing vascular disease ([address]     Gout     S/P left pulmonary artery pressure sensor implant placement 08/31/2017    ILD (interstitial lung disease) ([address] 08/20/2017    Warfarin anticoagulation 08/20/2017    COPD, severe ([address] 06/21/2017    DM (diabetes mellitus), type 2 with renal complications ([address] 80/59/6019    Normocytic anemia 05/26/2013    HTN (hypertension) 10/01/2012    Morbid obesity 10/01/2012    Gastroparesis 10/01/2012    Pulmonary hypertension ([address] 03/21/2012    Coronary artery disease 02/16/2011    JASEN on CPAP 02/16/2011    Sleep apnea 02/16/2011     Medical History:     Past Medical History:   Diagnosis Date     Sleep Apnea 2/16/2011    Compliant with c-pap.     Aortic aneurysm (HCC)     Abdominal    CAD (coronary Artery Disease) Native Artery 2/16/2011    Chronic kidney disease Hemodiaylsis  3x/week    CKD (chronic kidney disease) stage 4, GFR 15-29 ml/min ([facility]) 2/10/2017    Coagulation disorder [facility] 06/2018    Anemia  Last blood Transfusion    COPD (chronic obstructive pulmonary disease) ([facility])     Diabetic gastroparesis ([facility])     Diastolic heart failure ([facility] 10/5/2012    DM type 2 causing neurological disease ([facility]     DM type 2 causing renal disease ([facility])     Insulin SS at night only PRN    DM type 2 causing vascular disease ([facility]     Esophageal stricture 2012    dialted Dr. Alicia Diaz G6PD deficiency     trait    GERD (gastroesophageal reflux disease)     Gout     Hemodialysis access, AV graft ([facility] 04/02/2017    Dialysis MWF    [address]  Hypertension     ILD (interstitial lung disease) ([facility]     open lung bx CJW 2010    Morbid obesity ([facility]     OA (osteoarthritis)     Ovarian cancer ([facility]     cervical and uterine    Rheumatoid arteritis ([facility])     S/P left pulmonary artery pressure sensor implant placement 8/31/2017    [facility]     Thromboembolus ([facility]     Right calf     Tobacco use disorder 3/21/2012    Uterine cervix cancer ([facility]     Vitamin D deficiency 8/9/2013     Allergies: Allergies   Allergen Reactions    Contrast Dye [Iodine] Anaphylaxis     Tolerates when pre medicated w/ IV solumedrol and benadryl prior to procedure     Levaquin [Levofloxacin] Nausea and Vomiting    Morphine Hives and Itching      Medications:     Current Outpatient Medications   Medication Sig    isosorbide mononitrate ER (IMDUR) 120 mg CR tablet Take 1 tablet by mouth once daily    docusate sodium (STOOL SOFTENER PO) Take  by mouth daily.  nitroglycerin (NITROSTAT) 0.4 mg SL tablet 1 Tab by SubLINGual route every five (5) minutes as needed for Chest Pain. Up to 3 doses.  ergocalciferol (Vitamin D2) 1,250 mcg (50,000 unit) capsule Take 1 Cap by mouth every Tuesday.     pantoprazole (PROTONIX) 40 mg tablet TAKE 1 TABLET BY MOUTH TWICE DAILY FOR HEARTBURN  allopurinoL (ZYLOPRIM) 100 mg tablet Take 1 tablet by mouth once daily    amLODIPine (NORVASC) 10 mg tablet Take 1 Tab by mouth daily. Indications: high blood pressure    albuterol (PROAIR HFA) 90 mcg/actuation inhaler Take 2 Puffs by inhalation every four (4) hours as needed for Wheezing.  warfarin (COUMADIN) 2.5 mg tablet TAKE 1 TABLET BY MOUTH DAILY FOR LUNG EMBOLISM    sucralfate (CARAFATE) 1 gram tablet Take 1 Tab by mouth four (4) times daily.  atorvastatin (LIPITOR) 80 mg tablet Take 1 Tab by mouth nightly.  carvedilol (COREG) 25 mg tablet TAKE 1 AND 1/2 TABLET BY MOUTH TWO TIMES DAILY WITH MEALS    Oxygen O2 2L NC 24/7    naloxone (NARCAN) 4 mg/actuation nasal spray Use 1 spray intranasally, then discard. Repeat with new spray every 2 min as needed for opioid overdose symptoms, alternating nostrils. No current facility-administered medications for this visit. Surgical History:     Past Surgical History:   Procedure Laterality Date    CARDIAC SURG PROCEDURE UNLIST  02/2019    x4 with last sttent placed 2/2019.     CHEST SURGERY PROCEDURE UNLISTED Right     > 20 years ago  RLL removed     COLONOSCOPY N/A 6/24/2016    COLONOSCOPY performed by Trevor Chauhan [facility] [address] HX ADENOIDECTOMY      HX APPENDECTOMY      HX CARPAL TUNNEL RELEASE      bilateral    HX CHOLECYSTECTOMY      HX HEART CATHETERIZATION      x 7    HX HERNIA REPAIR      HX HYSTERECTOMY      HX ORTHOPAEDIC Right 11/12/12    right knee replacement    HX ORTHOPAEDIC Bilateral     carpal tunnel repair    HX SALPINGO-OOPHORECTOMY      HX TONSIL AND ADENOIDECTOMY      HX TONSILLECTOMY      HX VASCULAR ACCESS Left     Left lower arm AV fistula    UPPER GI ENDOSCOPY,VINOD COLON GUIDE  6/24/2016          Social History:     Social History     Socioeconomic History    Marital status:      Spouse name: Not on file    Number of children: Not on file    Years of education: Not on file    Highest education level: Not on file   Tobacco Use    Smoking status: Former Smoker     Packs/day: 0.50     Years: 41.00     Pack years: 20.50     Types: Cigarettes     Last attempt to quit: 2014     Years since quittin.6    Smokeless tobacco: Never Used   Substance and Sexual Activity    Alcohol use: No    Drug use: No     Patients pain effects her physically and psychologically. Her pain interferes with her quality of life and activities of daily living. Patient denies any personal or family history of addiction or substance abuse. [facility] 8th. Only received controlled substances from [facility]/Dr. Екатерина Kennedy. Previous records have been reviewed regarding this problem. Pt is not interested in discontinuing opioids at this time. Current morphine milligram equivalent: 33.75  Naloxone indicated (MME greater than 120/day): YES  Continue current medication regimen and follow up in 3 months for re-evaluation. Pain contract on file: YES    Denies any constipation. Taking dulcoloax and sennacot to prevent this. Prior to Admission medications    Medication Sig Start Date End Date Taking? Authorizing Provider   isosorbide mononitrate ER (IMDUR) 120 mg CR tablet Take 1 tablet by mouth once daily 20  Yes Samaria Gupta MD   docusate sodium (STOOL SOFTENER PO) Take  by mouth daily. Yes Provider, Historical   nitroglycerin (NITROSTAT) 0.4 mg SL tablet 1 Tab by SubLINGual route every five (5) minutes as needed for Chest Pain. Up to 3 doses. 20  Yes Louis Mahmood NP   ergocalciferol (Vitamin D2) 1,250 mcg (50,000 unit) capsule Take 1 Cap by mouth every Tuesday.  20  Yes Hallie Levine DO   pantoprazole (PROTONIX) 40 mg tablet TAKE 1 TABLET BY MOUTH TWICE DAILY FOR HEARTBURN 20  Yes Jeannie Pompa NP   allopurinoL (ZYLOPRIM) 100 mg tablet Take 1 tablet by mouth once daily 20  Yes Hallie Levine DO   amLODIPine (NORVASC) 10 mg tablet Take 1 Tab by mouth daily. Indications: high blood pressure 12/19/19  Yes Hallie Levine,    albuterol (PROAIR HFA) 90 mcg/actuation inhaler Take 2 Puffs by inhalation every four (4) hours as needed for Wheezing. 11/14/19  Yes Hallie Levine, DO   warfarin (COUMADIN) 2.5 mg tablet TAKE 1 TABLET BY MOUTH DAILY FOR LUNG EMBOLISM 11/14/19  Yes Hallie Levine, DO   sucralfate (CARAFATE) 1 gram tablet Take 1 Tab by mouth four (4) times daily. 9/4/19  Yes Hallie Levine, DO   atorvastatin (LIPITOR) 80 mg tablet Take 1 Tab by mouth nightly. 9/3/19  Yes Hallie Levine, DO   carvedilol (COREG) 25 mg tablet TAKE 1 AND 1/2 TABLET BY MOUTH TWO TIMES DAILY WITH MEALS 8/5/19  Yes Faylene Degree, NP   Oxygen O2 2L NC 24/7   Yes Provider, Historical   naloxone (NARCAN) 4 mg/actuation nasal spray Use 1 spray intranasally, then discard. Repeat with new spray every 2 min as needed for opioid overdose symptoms, alternating nostrils.  12/31/19   Curt Ovalle, DO     Allergies   Allergen Reactions    Contrast Dye [Iodine] Anaphylaxis     Tolerates when pre medicated w/ IV solumedrol and benadryl prior to procedure     Levaquin [Levofloxacin] Nausea and Vomiting    Morphine Hives and Itching       Patient Active Problem List    Diagnosis Date Noted    Arthralgia 03/17/2020    Coronary stent patent 03/17/2020    Edema of both lower extremities 03/17/2020    Fatigue 03/17/2020    Hematuria 03/17/2020    Hypervolemia 03/17/2020    Infected dental caries 03/17/2020    Loss of appetite 03/17/2020    End stage renal failure on dialysis ([facility] 03/17/2020    Flank pain 03/17/2020    Hypoxia 03/17/2020    Nausea and vomiting 03/17/2020    Infection of total right knee replacement ([facility] 03/17/2020    Chronic anticoagulation 12/13/2019    ESRD (end stage renal disease) ([facility] 05/28/2019    S/P ORIF (open reduction internal fixation) fracture 05/24/2019    Nephrolithiasis 03/20/2019    PVC (premature ventricular contraction) 03/20/2019    (HFpEF) heart failure with preserved ejection fraction ([facility] 09/23/2018    ESRD on hemodialysis ([facility] 06/23/2018    Limited mobility 06/21/2018    Hx of deep venous thrombosis 05/30/2018    Diabetic gastroparesis (HCC)     GERD (gastroesophageal reflux disease)     Rheumatoid arteritis ([facility]     DM type 2 causing neurological disease ([facility]     DM type 2 causing renal disease ([facility]     DM type 2 causing vascular disease ([facility]     Gout     S/P left pulmonary artery pressure sensor implant placement 08/31/2017    ILD (interstitial lung disease) ([facility] 08/20/2017    Warfarin anticoagulation 08/20/2017    COPD, severe ([facility] 06/21/2017    DM (diabetes mellitus), type 2 with renal complications ([facility] 27/03/5221    Normocytic anemia 05/26/2013    HTN (hypertension) 10/01/2012    Morbid obesity 10/01/2012    Gastroparesis 10/01/2012    Pulmonary hypertension ([facility] 03/21/2012    Coronary artery disease 02/16/2011    JASEN on CPAP 02/16/2011    Sleep apnea 02/16/2011     Current Outpatient Medications   Medication Sig Dispense Refill    isosorbide mononitrate ER (IMDUR) 120 mg CR tablet Take 1 tablet by mouth once daily 90 Tab 1    docusate sodium (STOOL SOFTENER PO) Take  by mouth daily.  nitroglycerin (NITROSTAT) 0.4 mg SL tablet 1 Tab by SubLINGual route every five (5) minutes as needed for Chest Pain. Up to 3 doses. 1 Bottle 1    ergocalciferol (Vitamin D2) 1,250 mcg (50,000 unit) capsule Take 1 Cap by mouth every Tuesday. 12 Cap 3    pantoprazole (PROTONIX) 40 mg tablet TAKE 1 TABLET BY MOUTH TWICE DAILY FOR HEARTBURN 60 Tab 0    allopurinoL (ZYLOPRIM) 100 mg tablet Take 1 tablet by mouth once daily 30 Tab 11    amLODIPine (NORVASC) 10 mg tablet Take 1 Tab by mouth daily. Indications: high blood pressure 90 Tab 3    albuterol (PROAIR HFA) 90 mcg/actuation inhaler Take 2 Puffs by inhalation every four (4) hours as needed for Wheezing.  1 Inhaler 5    warfarin (COUMADIN) 2.5 mg tablet TAKE 1 TABLET BY MOUTH DAILY FOR LUNG EMBOLISM 90 Tab 3    sucralfate (CARAFATE) 1 gram tablet Take 1 Tab by mouth four (4) times daily. 120 Tab 11    atorvastatin (LIPITOR) 80 mg tablet Take 1 Tab by mouth nightly. 90 Tab 3    carvedilol (COREG) 25 mg tablet TAKE 1 AND 1/2 TABLET BY MOUTH TWO TIMES DAILY WITH MEALS 270 Tab 3    Oxygen O2 2L NC 24/7      naloxone (NARCAN) 4 mg/actuation nasal spray Use 1 spray intranasally, then discard. Repeat with new spray every 2 min as needed for opioid overdose symptoms, alternating nostrils. 1 Each 5     Allergies   Allergen Reactions    Contrast Dye [Iodine] Anaphylaxis     Tolerates when pre medicated w/ IV solumedrol and benadryl prior to procedure     Levaquin [Levofloxacin] Nausea and Vomiting    Morphine Hives and Itching       ROS      Objective: There were no vitals taken for this visit. General: alert, cooperative, no distress   Mental  status: normal mood, behavior, speech, dress, motor activity, and thought processes, able to follow commands   HENT: NCAT   Neck: no visualized mass   Resp: no respiratory distress   Neuro: no gross deficits   Skin: no discoloration or lesions of concern on visible areas   Psychiatric: normal affect, consistent with stated mood, no evidence of hallucinations     Additional exam findings: We discussed the expected course, resolution and complications of the diagnosis(es) in detail. Medication risks, benefits, costs, interactions, and alternatives were discussed as indicated. I advised her to contact the office if her condition worsens, changes or fails to improve as anticipated. She expressed understanding with the diagnosis(es) and plan. Ethel Santiago is a 60 y.o. female who was evaluated by a video visit encounter for concerns as above. Patient identification was verified prior to start of the visit. A caregiver was present when appropriate.  Due to this being a TeleHealth encounter (During JUAMZ-48 public health emergency), evaluation of the following organ systems was limited: Vitals/Constitutional/EENT/Resp/CV/GI//MS/Neuro/Skin/Heme-Lymph-Imm. Pursuant to the emergency declaration under the [facility], [facility] waiver authority and the [employer] Act, this Virtual  Visit was conducted, with patient's (and/or legal guardian's) consent, to reduce the patient's risk of exposure to COVID-19 and provide necessary medical care. Services were provided through a video synchronous discussion virtually to substitute for in-person clinic visit. Patient and provider were located at their individual homes.       Slim Coburn NP

## 2020-06-28 DIAGNOSIS — K21.9 GASTROESOPHAGEAL REFLUX DISEASE, ESOPHAGITIS PRESENCE NOT SPECIFIED: ICD-10-CM

## 2020-06-29 RX ORDER — PANTOPRAZOLE SODIUM 40 MG/1
TABLET, DELAYED RELEASE ORAL
Qty: 60 TAB | Refills: 0 | Status: SHIPPED | OUTPATIENT
Start: 2020-06-29 | End: 2020-07-28

## 2020-07-17 ENCOUNTER — TELEPHONE (OUTPATIENT)
Dept: FAMILY MEDICINE CLINIC | Age: 65
End: 2020-07-17

## 2020-07-17 NOTE — TELEPHONE ENCOUNTER
Pt calling and states that she came to flu clinic on 6/15 to have PT/INR done. She thinks she was told to come back in 1 month to have drawn again. She needs to know if this is the case or when she should come back for this. Please call her back at [phone].

## 2020-07-23 ENCOUNTER — OFFICE VISIT (OUTPATIENT)
Dept: FAMILY MEDICINE CLINIC | Age: 65
End: 2020-07-23

## 2020-07-23 VITALS
HEIGHT: 70 IN | DIASTOLIC BLOOD PRESSURE: 70 MMHG | OXYGEN SATURATION: 94 % | RESPIRATION RATE: 12 BRPM | BODY MASS INDEX: 40.94 KG/M2 | TEMPERATURE: 98.5 F | HEART RATE: 107 BPM | WEIGHT: 286 LBS | SYSTOLIC BLOOD PRESSURE: 125 MMHG

## 2020-07-23 DIAGNOSIS — I26.99 OTHER PULMONARY EMBOLISM WITHOUT ACUTE COR PULMONALE, UNSPECIFIED CHRONICITY (HCC): ICD-10-CM

## 2020-07-23 DIAGNOSIS — M54.50 CHRONIC BILATERAL LOW BACK PAIN, UNSPECIFIED WHETHER SCIATICA PRESENT: ICD-10-CM

## 2020-07-23 DIAGNOSIS — Z79.01 ANTICOAGULATED ON COUMADIN: Primary | ICD-10-CM

## 2020-07-23 DIAGNOSIS — G89.29 CHRONIC BILATERAL LOW BACK PAIN, UNSPECIFIED WHETHER SCIATICA PRESENT: ICD-10-CM

## 2020-07-23 LAB
INR BLD: 1.5
PT POC: 18 SECONDS
VALID INTERNAL CONTROL?: YES

## 2020-07-23 RX ORDER — LEVOCETIRIZINE DIHYDROCHLORIDE 5 MG/1
5 TABLET, FILM COATED ORAL DAILY
Qty: 30 TAB | Refills: 5 | Status: SHIPPED | OUTPATIENT
Start: 2020-07-23 | End: 2022-04-12

## 2020-07-23 RX ORDER — WARFARIN 3 MG/1
3 TABLET ORAL EVERY OTHER DAY
COMMUNITY
Start: 2020-07-23 | End: 2020-10-14 | Stop reason: SDUPTHER

## 2020-07-23 RX ORDER — WARFARIN 2.5 MG/1
2.5 TABLET ORAL EVERY OTHER DAY
Qty: 90 TAB | Refills: 3 | Status: SHIPPED | OUTPATIENT
Start: 2020-07-23 | End: 2021-04-06 | Stop reason: SDUPTHER

## 2020-07-23 RX ORDER — OXYCODONE AND ACETAMINOPHEN 7.5; 325 MG/1; MG/1
1 TABLET ORAL 3 TIMES DAILY
Qty: 90 TAB | Refills: 0 | Status: SHIPPED | OUTPATIENT
Start: 2020-07-23 | End: 2020-09-15 | Stop reason: SDUPTHER

## 2020-07-23 NOTE — PROGRESS NOTES
HISTORY OF PRESENT ILLNESS  Sun Sinclair is a 60 y.o. female. HPI  Anticoagulation  Patient here for followup of chronic anticoagulation. Indication: PE.  Bleeding Signs/Symptoms:  None. Thromboembolic Signs/Symptoms:  None. INR's are drawn regularly and have been therapeutic.   Lab Results   Component Value Date/Time    INR 1.2 (H) 05/28/2019 06:20 AM    INR POC 1.5 07/23/2020 08:38 AM     GERD/chronic nausea:  Needs referral to GI for eval for chronic GERD despite meds  Allergic Rhinitis:  Constant PND with dry cough, ear pressure  Not on any meds for seasonal allergies  Chronic pain management:  Due for refill of Percocet that she takes TID   reviewed    Patient Active Problem List    Diagnosis Date Noted    Arthralgia 03/17/2020    Coronary stent patent 03/17/2020    Edema of both lower extremities 03/17/2020    Fatigue 03/17/2020    Hematuria 03/17/2020    Hypervolemia 03/17/2020    Infected dental caries 03/17/2020    Loss of appetite 03/17/2020    End stage renal failure on dialysis ([facility]) 03/17/2020    Flank pain 03/17/2020    Hypoxia 03/17/2020    Nausea and vomiting 03/17/2020    Infection of total right knee replacement ([facility]) 03/17/2020    Chronic anticoagulation 12/13/2019    ESRD (end stage renal disease) ([facility]) 05/28/2019    S/P ORIF (open reduction internal fixation) fracture 05/24/2019    Nephrolithiasis 03/20/2019    PVC (premature ventricular contraction) 03/20/2019    (HFpEF) heart failure with preserved ejection fraction ([facility]) 09/23/2018    ESRD on hemodialysis ([facility]) 06/23/2018    Limited mobility 06/21/2018    Hx of deep venous thrombosis 05/30/2018    Diabetic gastroparesis (HCC)     GERD (gastroesophageal reflux disease)     Rheumatoid arteritis ([facility])     DM type 2 causing neurological disease ([facility])     DM type 2 causing renal disease ([facility])     DM type 2 causing vascular disease ([facility])     Gout     S/P left pulmonary artery pressure sensor implant placement 08/31/2017    ILD (interstitial lung disease) ([facility] 08/20/2017    Warfarin anticoagulation 08/20/2017    COPD, severe ([facility] 06/21/2017    DM (diabetes mellitus), type 2 with renal complications ([facility] 00/99/6704    Normocytic anemia 05/26/2013    HTN (hypertension) 10/01/2012    Morbid obesity 10/01/2012    Gastroparesis 10/01/2012    Pulmonary hypertension ([facility] 03/21/2012    Coronary artery disease 02/16/2011    JASEN on CPAP 02/16/2011    Sleep apnea 02/16/2011     Current Outpatient Medications   Medication Sig Dispense Refill    warfarin (COUMADIN) 2.5 mg tablet Take 1 Tab by mouth every other day. 90 Tab 3    warfarin (COUMADIN) 3 mg tablet Take 1 Tab by mouth every other day.  levocetirizine (XYZAL) 5 mg tablet Take 1 Tab by mouth daily. 30 Tab 5    oxyCODONE-acetaminophen (PERCOCET 7.5) 7.5-325 mg per tablet Take 1 Tab by mouth three (3) times daily for 30 days. Max Daily Amount: 3 Tabs. 90 Tab 0    pantoprazole (PROTONIX) 40 mg tablet TAKE 1 TABLET BY MOUTH TWICE DAILY FOR HEARTBURN 60 Tab 0    isosorbide mononitrate ER (IMDUR) 120 mg CR tablet Take 1 tablet by mouth once daily 90 Tab 1    docusate sodium (STOOL SOFTENER PO) Take  by mouth daily.  nitroglycerin (NITROSTAT) 0.4 mg SL tablet 1 Tab by SubLINGual route every five (5) minutes as needed for Chest Pain. Up to 3 doses. 1 Bottle 1    ergocalciferol (Vitamin D2) 1,250 mcg (50,000 unit) capsule Take 1 Cap by mouth every Tuesday. 12 Cap 3    allopurinoL (ZYLOPRIM) 100 mg tablet Take 1 tablet by mouth once daily 30 Tab 11    amLODIPine (NORVASC) 10 mg tablet Take 1 Tab by mouth daily. Indications: high blood pressure 90 Tab 3    albuterol (PROAIR HFA) 90 mcg/actuation inhaler Take 2 Puffs by inhalation every four (4) hours as needed for Wheezing. 1 Inhaler 5    sucralfate (CARAFATE) 1 gram tablet Take 1 Tab by mouth four (4) times daily.  120 Tab 11    atorvastatin (LIPITOR) 80 mg tablet Take 1 Tab by mouth nightly. 90 Tab 3    carvedilol (COREG) 25 mg tablet TAKE 1 AND 1/2 TABLET BY MOUTH TWO TIMES DAILY WITH MEALS 270 Tab 3    Oxygen O2 2L NC 24/7      naloxone (NARCAN) 4 mg/actuation nasal spray Use 1 spray intranasally, then discard. Repeat with new spray every 2 min as needed for opioid overdose symptoms, alternating nostrils. 1 Each 5           ROS  A comprehensive review of system was obtained and negative except findings in the HPI    Visit Vitals  /70 (BP 1 Location: Right arm, BP Patient Position: Sitting)   Pulse (!) 107   Temp 98.5 °F (36.9 °C) (Oral)   Resp 12   Ht 5' 10\" (1.778 m)   Wt 286 lb (129.7 kg)   SpO2 94%   BMI 41.04 kg/m²     Physical Exam  Vitals signs and nursing note reviewed. Constitutional:       Appearance: She is well-developed. Comments:      HENT:      Right Ear: Tympanic membrane and ear canal normal.      Left Ear: Tympanic membrane and ear canal normal.   Neck:      Vascular: No JVD. Cardiovascular:      Rate and Rhythm: Normal rate and regular rhythm. Heart sounds: No murmur. No friction rub. No gallop. Pulmonary:      Effort: Pulmonary effort is normal. No respiratory distress. Breath sounds: Normal breath sounds. No wheezing. Skin:     General: Skin is warm. Neurological:      Mental Status: She is alert and oriented to person, place, and time. ASSESSMENT and PLAN  Encounter Diagnoses   Name Primary?     Anticoagulated on Coumadin Yes    Other pulmonary embolism without acute cor pulmonale, unspecified chronicity (HCC)     Chronic bilateral low back pain, unspecified whether sciatica present      Orders Placed This Encounter    AMB POC PT/INR    warfarin (COUMADIN) 2.5 mg tablet    warfarin (COUMADIN) 3 mg tablet    levocetirizine (XYZAL) 5 mg tablet    oxyCODONE-acetaminophen (PERCOCET 7.5) 7.5-325 mg per tablet     Refills updated toda for her percocet  Change warfarin to 2.5/3mg every other day  Start xyzal for allergies        Recheck INR 1 week    I have discussed the diagnosis with the patient and the intended plan as seen in the above orders. The patient has received an after-visit summary and questions were answered concerning future plans. Patient conveyed understanding of the plan at the time of the visit.     Evelina Forte, MSN, ANP  7/23/2020

## 2020-07-23 NOTE — PROGRESS NOTES
Chief Complaint   Patient presents with    Labs    Nausea     Pt in office today for labs  -pt states she has been having headaches and feeling nauseous    1. Have you been to the ER, urgent care clinic since your last visit? Hospitalized since your last visit? No    2. Have you seen or consulted any other health care providers outside of the [address] [place] [address] since your last visit? Include any pap smears or colon screening.  No     Pt has no other concerns

## 2020-07-28 DIAGNOSIS — K21.9 GASTROESOPHAGEAL REFLUX DISEASE, ESOPHAGITIS PRESENCE NOT SPECIFIED: ICD-10-CM

## 2020-07-28 RX ORDER — PANTOPRAZOLE SODIUM 40 MG/1
TABLET, DELAYED RELEASE ORAL
Qty: 60 TAB | Refills: 0 | Status: SHIPPED | OUTPATIENT
Start: 2020-07-28 | End: 2020-09-10

## 2020-07-30 ENCOUNTER — HOSPITAL ENCOUNTER (OUTPATIENT)
Dept: LAB | Age: 65
Discharge: HOME | End: 2020-07-30

## 2020-07-30 ENCOUNTER — OFFICE VISIT (OUTPATIENT)
Dept: FAMILY MEDICINE CLINIC | Age: 65
End: 2020-07-30

## 2020-07-30 VITALS
OXYGEN SATURATION: 96 % | DIASTOLIC BLOOD PRESSURE: 63 MMHG | HEIGHT: 70 IN | WEIGHT: 288.8 LBS | TEMPERATURE: 98 F | BODY MASS INDEX: 41.35 KG/M2 | SYSTOLIC BLOOD PRESSURE: 113 MMHG | RESPIRATION RATE: 16 BRPM | HEART RATE: 98 BPM

## 2020-07-30 DIAGNOSIS — K62.5 BRBPR (BRIGHT RED BLOOD PER RECTUM): ICD-10-CM

## 2020-07-30 DIAGNOSIS — R10.13 EPIGASTRIC PAIN: ICD-10-CM

## 2020-07-30 DIAGNOSIS — D68.9 COAGULATION DEFECT (HCC): ICD-10-CM

## 2020-07-30 DIAGNOSIS — Z51.81 ENCOUNTER FOR MONITORING COUMADIN THERAPY: Primary | ICD-10-CM

## 2020-07-30 DIAGNOSIS — Z79.01 ENCOUNTER FOR MONITORING COUMADIN THERAPY: Primary | ICD-10-CM

## 2020-07-30 LAB
ALBUMIN SERPL-MCNC: 3.4 G/DL (ref 3.5–5)
ALBUMIN/GLOB SERPL: 1.1 {RATIO} (ref 1.1–2.2)
ALP SERPL-CCNC: 300 U/L (ref 45–117)
ALT SERPL-CCNC: 8 U/L (ref 12–78)
ANION GAP SERPL CALC-SCNC: 7 MMOL/L (ref 5–15)
AST SERPL-CCNC: 7 U/L (ref 15–37)
BILIRUB SERPL-MCNC: 0.4 MG/DL (ref 0.2–1)
BUN SERPL-MCNC: 32 MG/DL (ref 6–20)
BUN/CREAT SERPL: 5 (ref 12–20)
CALCIUM SERPL-MCNC: 8.3 MG/DL (ref 8.5–10.1)
CHLORIDE SERPL-SCNC: 106 MMOL/L (ref 97–108)
CO2 SERPL-SCNC: 29 MMOL/L (ref 21–32)
CREAT SERPL-MCNC: 6.37 MG/DL (ref 0.55–1.02)
GLOBULIN SER CALC-MCNC: 3.2 G/DL (ref 2–4)
GLUCOSE SERPL-MCNC: 89 MG/DL (ref 65–100)
INR BLD: 2
LIPASE SERPL-CCNC: 76 U/L (ref 73–393)
POTASSIUM SERPL-SCNC: 4 MMOL/L (ref 3.5–5.1)
PROT SERPL-MCNC: 6.6 G/DL (ref 6.4–8.2)
PT POC: 24.2 SECONDS
SODIUM SERPL-SCNC: 142 MMOL/L (ref 136–145)
VALID INTERNAL CONTROL?: YES

## 2020-07-30 NOTE — PROGRESS NOTES
Progress Note    she is a 60y.o. year old female who presents for evalution. Subjective:     Pt for INR and is therapeutic at 2.0. Pt is alternating 2.5 and 3mg daily. This was changed last week. Pt states she is having pain in the epigastric region. Pt also states she had a ct scan done and the piece they thought was in her lungs is not a piece of catheter. This is actually her cardiacmems unit for CHF monitoring and is in the wrong location per pulmonary pt states. Epigastric pain is all the time. Feels like an ache. Currently on protonix 40 bid and carafate bid. Is due for EGD. Also reports some BRBPR last week. No more blood in stool. Has hx of hemorrhoids. No pain in rectal region. Just had HH at dialysis and was 9.4. Nop need to recheck today. Reviewed PmHx, RxHx, FmHx, SocHx, AllgHx and updated and dated in the chart. Review of Systems - negative except as listed above in the HPI    Objective:     Vitals:    07/30/20 1456   BP: 113/63   Pulse: 98   Resp: 16   Temp: 98 °F (36.7 °C)   TempSrc: Oral   SpO2: 96%   Weight: 288 lb 12.8 oz (131 kg)   Height: 5' 10\" (1.778 m)       Current Outpatient Medications   Medication Sig    pantoprazole (PROTONIX) 40 mg tablet TAKE 1 TABLET BY MOUTH TWICE DAILY FOR HEARTBURN    warfarin (COUMADIN) 2.5 mg tablet Take 1 Tab by mouth every other day.  warfarin (COUMADIN) 3 mg tablet Take 1 Tab by mouth every other day.  levocetirizine (XYZAL) 5 mg tablet Take 1 Tab by mouth daily.  oxyCODONE-acetaminophen (PERCOCET 7.5) 7.5-325 mg per tablet Take 1 Tab by mouth three (3) times daily for 30 days. Max Daily Amount: 3 Tabs.  isosorbide mononitrate ER (IMDUR) 120 mg CR tablet Take 1 tablet by mouth once daily    docusate sodium (STOOL SOFTENER PO) Take  by mouth daily.  nitroglycerin (NITROSTAT) 0.4 mg SL tablet 1 Tab by SubLINGual route every five (5) minutes as needed for Chest Pain. Up to 3 doses.     ergocalciferol (Vitamin D2) 1,250 mcg (50,000 unit) capsule Take 1 Cap by mouth every Tuesday.  allopurinoL (ZYLOPRIM) 100 mg tablet Take 1 tablet by mouth once daily    naloxone (NARCAN) 4 mg/actuation nasal spray Use 1 spray intranasally, then discard. Repeat with new spray every 2 min as needed for opioid overdose symptoms, alternating nostrils.  amLODIPine (NORVASC) 10 mg tablet Take 1 Tab by mouth daily. Indications: high blood pressure    albuterol (PROAIR HFA) 90 mcg/actuation inhaler Take 2 Puffs by inhalation every four (4) hours as needed for Wheezing.  sucralfate (CARAFATE) 1 gram tablet Take 1 Tab by mouth four (4) times daily.  atorvastatin (LIPITOR) 80 mg tablet Take 1 Tab by mouth nightly.  carvedilol (COREG) 25 mg tablet TAKE 1 AND 1/2 TABLET BY MOUTH TWO TIMES DAILY WITH MEALS    Oxygen O2 2L NC 24/7     No current facility-administered medications for this visit. Physical Examination: General appearance - alert, well appearing, and in no distress  Chest - clear to auscultation, no wheezes, rales or rhonchi, symmetric air entry  Heart - normal rate, regular rhythm, normal S1, S2, no murmurs, rubs, clicks or gallops  Abdomen - tenderness noted epigastric, and RU and LUQ      Assessment/ Plan:   Diagnoses and all orders for this visit:    1. Encounter for monitoring Coumadin therapy  -     AMB POC PT/INR    2. Epigastric pain  -     LIPASE; Future  -     METABOLIC PANEL, COMPREHENSIVE; Future  -     REFERRAL TO GASTROENTEROLOGY    3. Coagulation defect ([facility]    4. BRBPR (bright red blood per rectum)  -     REFERRAL TO GASTROENTEROLOGY     INR at goal, dose was recently adjusted last week will have patient continue current dosing and plan to recheck in 2 to 3 weeks to ensure stability. Follow-up and Dispositions    · Return in about 3 weeks (around 8/20/2020), or if symptoms worsen or fail to improve.            I have discussed the diagnosis with the patient and the intended plan as seen in the above orders. The patient has received an after-visit summary and questions were answered concerning future plans. Pt conveyed understanding of plan.     Medication Side Effects and Warnings were discussed with patient      Joseph Yanez, DO

## 2020-07-30 NOTE — PATIENT INSTRUCTIONS
A Healthy Lifestyle: Care Instructions Your Care Instructions A healthy lifestyle can help you feel good, stay at a healthy weight, and have plenty of energy for both work and play. A healthy lifestyle is something you can share with your whole family. A healthy lifestyle also can lower your risk for serious health problems, such as high blood pressure, heart disease, and diabetes. You can follow a few steps listed below to improve your health and the health of your family. Follow-up care is a key part of your treatment and safety. Be sure to make and go to all appointments, and call your doctor if you are having problems. It's also a good idea to know your test results and keep a list of the medicines you take. How can you care for yourself at home? · Do not eat too much sugar, fat, or fast foods. You can still have dessert and treats now and then. The goal is moderation. · Start small to improve your eating habits. Pay attention to portion sizes, drink less juice and soda pop, and eat more fruits and vegetables. ? Eat a healthy amount of food. A 3-ounce serving of meat, for example, is about the size of a deck of cards. Fill the rest of your plate with vegetables and whole grains. ? Limit the amount of soda and sports drinks you have every day. Drink more water when you are thirsty. ? Eat at least 5 servings of fruits and vegetables every day. It may seem like a lot, but it is not hard to reach this goal. A serving or helping is 1 piece of fruit, 1 cup of vegetables, or 2 cups of leafy, raw vegetables. Have an apple or some carrot sticks as an afternoon snack instead of a candy bar. Try to have fruits and/or vegetables at every meal. 
· Make exercise part of your daily routine. You may want to start with simple activities, such as walking, bicycling, or slow swimming. Try to be active 30 to 60 minutes every day.  You do not need to do all 30 to 60 minutes all at once. For example, you can exercise 3 times a day for 10 or 20 minutes. Moderate exercise is safe for most people, but it is always a good idea to talk to your doctor before starting an exercise program. 
· Keep moving. [place]ia An[place] the lawn, work in the garden, or [place]. Take the stairs instead of the elevator at work. · If you smoke, quit. People who smoke have an increased risk for heart attack, stroke, cancer, and other lung illnesses. Quitting is hard, but there are ways to boost your chance of quitting tobacco for good. ? Use nicotine gum, patches, or lozenges. ? Ask your doctor about stop-smoking programs and medicines. ? Keep trying. In addition to reducing your risk of diseases in the future, you will notice some benefits soon after you stop using tobacco. If you have shortness of breath or asthma symptoms, they will likely get better within a few weeks after you quit. · Limit how much alcohol you drink. Moderate amounts of alcohol (up to 2 drinks a day for men, 1 drink a day for women) are okay. But drinking too much can lead to liver problems, high blood pressure, and other health problems. Family health If you have a family, there are many things you can do together to improve your health. · Eat meals together as a family as often as possible. · Eat healthy foods. This includes fruits, vegetables, lean meats and dairy, and whole grains. · Include your family in your fitness plan. Most people think of activities such as jogging or tennis as the way to fitness, but there are many ways you and your family can be more active. Anything that makes you breathe hard and gets your heart pumping is exercise. Here are some tips: 
? Walk to do errands or to take your child to school or the bus. 
? Go for a family bike ride after dinner instead of watching TV. Where can you learn more? Go to http://lindsay-kai.info/ Enter H670 in the search box to learn more about \"A Healthy Lifestyle: Care Instructions. \" Current as of: January 31, 2020               Content Version: 12.5 © 2006-2020 [employer]. Care instructions adapted under license by "[employer]" (which disclaims liability or warranty for this information). If you have questions about a medical condition or this instruction, always ask your healthcare professional. [place] [address] any warranty or liability for your use of this information.

## 2020-07-30 NOTE — PROGRESS NOTES
Chief Complaint   Patient presents with    Follow-up     Patient presents in office today for INR check. Has c/o abdominal pain around her belly button for about a month. Also has c/o pain under her left breast for over a month. No other concerns. 1. Have you been to the ER, urgent care clinic since your last visit? Hospitalized since your last visit? No    2. Have you seen or consulted any other health care providers outside of the [address] [place] [address] since your last visit? Include any pap smears or colon screening.  No    Learning Assessment 6/28/2019   PRIMARY LEARNER Patient   PRIMARY LANGUAGE ENGLISH   LEARNER PREFERENCE PRIMARY LISTENING   ANSWERED BY patient    RELATIONSHIP SELF

## 2020-07-31 LAB — GGT SERPL-CCNC: 29 U/L (ref 5–55)

## 2020-07-31 NOTE — PROGRESS NOTES
Your lipase for your pancreas level is normal.  Your alkaline phosphatase is elevated and I am seeing if I can add on another test which will help determine if this is coming from the liver or from the bone.

## 2020-08-04 NOTE — PROGRESS NOTES
Your GGT came back normal which points to the elevated alkaline phosphatase being increased due to bone disease which is likely secondary to your kidney disease.

## 2020-08-18 ENCOUNTER — OFFICE VISIT (OUTPATIENT)
Dept: FAMILY MEDICINE CLINIC | Age: 65
End: 2020-08-18
Payer: MEDICARE

## 2020-08-18 VITALS
HEART RATE: 63 BPM | SYSTOLIC BLOOD PRESSURE: 98 MMHG | TEMPERATURE: 98 F | HEIGHT: 70 IN | DIASTOLIC BLOOD PRESSURE: 58 MMHG | OXYGEN SATURATION: 94 % | RESPIRATION RATE: 18 BRPM | BODY MASS INDEX: 41.44 KG/M2

## 2020-08-18 DIAGNOSIS — Z79.01 ANTICOAGULATED ON COUMADIN: Primary | ICD-10-CM

## 2020-08-18 DIAGNOSIS — B35.1 FUNGAL NAIL INFECTION: ICD-10-CM

## 2020-08-18 LAB
INR BLD: 2.2
PT POC: NORMAL
VALID INTERNAL CONTROL?: YES

## 2020-08-18 PROCEDURE — G9231 DOC ESRD DIA TRANS PREG: HCPCS | Performed by: NURSE PRACTITIONER

## 2020-08-18 PROCEDURE — G8417 CALC BMI ABV UP PARAM F/U: HCPCS | Performed by: NURSE PRACTITIONER

## 2020-08-18 PROCEDURE — G8427 DOCREV CUR MEDS BY ELIG CLIN: HCPCS | Performed by: NURSE PRACTITIONER

## 2020-08-18 PROCEDURE — 99213 OFFICE O/P EST LOW 20 MIN: CPT | Performed by: NURSE PRACTITIONER

## 2020-08-18 PROCEDURE — 85610 PROTHROMBIN TIME: CPT | Performed by: NURSE PRACTITIONER

## 2020-08-18 PROCEDURE — G8432 DEP SCR NOT DOC, RNG: HCPCS | Performed by: NURSE PRACTITIONER

## 2020-08-18 PROCEDURE — 3017F COLORECTAL CA SCREEN DOC REV: CPT | Performed by: NURSE PRACTITIONER

## 2020-08-18 RX ORDER — TERBINAFINE HYDROCHLORIDE 250 MG/1
250 TABLET ORAL DAILY
Qty: 30 TAB | Refills: 1 | Status: SHIPPED | OUTPATIENT
Start: 2020-08-18 | End: 2020-10-17

## 2020-08-18 NOTE — PROGRESS NOTES
Chief Complaint   Patient presents with    Nail Problem    Labs     Pt in office today for fungus under her nail  -pt states she has acrylic on her nails and took them off and noticed fungus  -pt denies pain and itching  -inr    1. Have you been to the ER, urgent care clinic since your last visit? Hospitalized since your last visit? No    2. Have you seen or consulted any other health care providers outside of the [address] [place] [address] since your last visit? Include any pap smears or colon screening.  No     Pt has no other concerns

## 2020-08-18 NOTE — PROGRESS NOTES
Anticoagulation  Patient here for followup of chronic anticoagulation. Indication: Atrial Fibrillation. Bleeding Signs/Symptoms:  None. INR's are drawn regularly and have been Therapeutic. Had gel nails taken off due to pain, very painful and now several nails are green/yellow in color  Not sure the salon was clean    INR today is  2.2. Plan: Coumadin dosing-   no change   Given Lamisil 250mg one a day for 8 weeks  Reviewed how to take and what to expect    Recheck INR in 4 week(s)  I have discussed the diagnosis with the patient and the intended plan as seen in the above orders. The patient has received an after-visit summary and questions were answered concerning future plans. Patient conveyed understanding of the plan at the time of the visit.     Noemi Xiong, MSN, ANP  8/18/2020

## 2020-08-21 RX ORDER — CARVEDILOL 25 MG/1
TABLET ORAL
Qty: 270 TAB | Refills: 1 | Status: SHIPPED | OUTPATIENT
Start: 2020-08-21 | End: 2021-03-04

## 2020-08-21 NOTE — TELEPHONE ENCOUNTER
Requested Prescriptions     Signed Prescriptions Disp Refills    carvediloL (COREG) 25 mg tablet 270 Tab 1     Sig: TAKE 1 & 1/2 (ONE & ONE-HALF) TABLETS BY MOUTH TWICE DAILY WITH MEALS     Authorizing Provider: Shelley Mix     Ordering User: TAO SALMON

## 2020-09-08 ENCOUNTER — TELEPHONE (OUTPATIENT)
Dept: FAMILY MEDICINE CLINIC | Age: 65
End: 2020-09-08

## 2020-09-08 DIAGNOSIS — G89.29 CHRONIC BILATERAL LOW BACK PAIN, UNSPECIFIED WHETHER SCIATICA PRESENT: Primary | ICD-10-CM

## 2020-09-08 DIAGNOSIS — M54.50 CHRONIC BILATERAL LOW BACK PAIN, UNSPECIFIED WHETHER SCIATICA PRESENT: Primary | ICD-10-CM

## 2020-09-08 RX ORDER — OXYCODONE AND ACETAMINOPHEN 5; 325 MG/1; MG/1
1 TABLET ORAL
Qty: 30 TAB | Refills: 0 | Status: SHIPPED | OUTPATIENT
Start: 2020-09-08 | End: 2020-09-15

## 2020-09-08 NOTE — TELEPHONE ENCOUNTER
Attempted to call pt no answer left vm that meds were sent to the pharmacy on file and that if she needed anything to call the office back

## 2020-09-09 DIAGNOSIS — K21.9 GASTROESOPHAGEAL REFLUX DISEASE, ESOPHAGITIS PRESENCE NOT SPECIFIED: ICD-10-CM

## 2020-09-09 DIAGNOSIS — I25.118 ATHEROSCLEROSIS OF NATIVE CORONARY ARTERY OF NATIVE HEART WITH STABLE ANGINA PECTORIS (HCC): Chronic | ICD-10-CM

## 2020-09-09 RX ORDER — ATORVASTATIN CALCIUM 80 MG/1
TABLET, FILM COATED ORAL
Qty: 30 TAB | Refills: 0 | Status: SHIPPED | OUTPATIENT
Start: 2020-09-09 | End: 2020-10-01

## 2020-09-09 RX ORDER — SUCRALFATE 1 G/1
TABLET ORAL
Qty: 120 TAB | Refills: 0 | Status: SHIPPED | OUTPATIENT
Start: 2020-09-09 | End: 2021-11-12 | Stop reason: SDUPTHER

## 2020-09-10 ENCOUNTER — DOCUMENTATION ONLY (OUTPATIENT)
Dept: FAMILY MEDICINE CLINIC | Age: 65
End: 2020-09-10

## 2020-09-10 RX ORDER — PANTOPRAZOLE SODIUM 40 MG/1
TABLET, DELAYED RELEASE ORAL
Qty: 60 TAB | Refills: 0 | Status: SHIPPED | OUTPATIENT
Start: 2020-09-10 | End: 2020-10-16

## 2020-09-10 NOTE — PROGRESS NOTES
Pre-Procedure anticoagulants cardiovascular risk assessment form was placed on [place] NP desk to process.

## 2020-09-15 ENCOUNTER — HOSPITAL ENCOUNTER (OUTPATIENT)
Dept: LAB | Age: 65
Discharge: HOME | End: 2020-09-15

## 2020-09-15 ENCOUNTER — OFFICE VISIT (OUTPATIENT)
Dept: FAMILY MEDICINE CLINIC | Age: 65
End: 2020-09-15
Payer: MEDICARE

## 2020-09-15 VITALS
RESPIRATION RATE: 16 BRPM | HEIGHT: 70 IN | BODY MASS INDEX: 41.35 KG/M2 | TEMPERATURE: 97.8 F | DIASTOLIC BLOOD PRESSURE: 73 MMHG | OXYGEN SATURATION: 95 % | SYSTOLIC BLOOD PRESSURE: 129 MMHG | WEIGHT: 288.8 LBS | HEART RATE: 80 BPM

## 2020-09-15 DIAGNOSIS — Z79.01 ENCOUNTER FOR MONITORING COUMADIN THERAPY: Primary | ICD-10-CM

## 2020-09-15 DIAGNOSIS — M54.50 CHRONIC BILATERAL LOW BACK PAIN, UNSPECIFIED WHETHER SCIATICA PRESENT: ICD-10-CM

## 2020-09-15 DIAGNOSIS — E11.59 DM TYPE 2 CAUSING VASCULAR DISEASE (HCC): ICD-10-CM

## 2020-09-15 DIAGNOSIS — M25.561 CHRONIC PAIN OF RIGHT KNEE: ICD-10-CM

## 2020-09-15 DIAGNOSIS — Z00.00 MEDICARE ANNUAL WELLNESS VISIT, SUBSEQUENT: ICD-10-CM

## 2020-09-15 DIAGNOSIS — Z13.31 SCREENING FOR DEPRESSION: ICD-10-CM

## 2020-09-15 DIAGNOSIS — G89.29 CHRONIC PAIN OF RIGHT KNEE: ICD-10-CM

## 2020-09-15 DIAGNOSIS — Z12.31 ENCOUNTER FOR SCREENING MAMMOGRAM FOR MALIGNANT NEOPLASM OF BREAST: ICD-10-CM

## 2020-09-15 DIAGNOSIS — Z51.81 ENCOUNTER FOR MONITORING COUMADIN THERAPY: Primary | ICD-10-CM

## 2020-09-15 DIAGNOSIS — E11.9 COMPREHENSIVE DIABETIC FOOT EXAMINATION, TYPE 2 DM, ENCOUNTER FOR (HCC): ICD-10-CM

## 2020-09-15 DIAGNOSIS — G89.29 CHRONIC BILATERAL LOW BACK PAIN, UNSPECIFIED WHETHER SCIATICA PRESENT: ICD-10-CM

## 2020-09-15 DIAGNOSIS — Z23 NEEDS FLU SHOT: ICD-10-CM

## 2020-09-15 LAB
INR BLD: 3.1
PT POC: 36.7 SECONDS
VALID INTERNAL CONTROL?: YES

## 2020-09-15 PROCEDURE — 3046F HEMOGLOBIN A1C LEVEL >9.0%: CPT | Performed by: FAMILY MEDICINE

## 2020-09-15 PROCEDURE — 2022F DILAT RTA XM EVC RTNOPTHY: CPT | Performed by: FAMILY MEDICINE

## 2020-09-15 PROCEDURE — G0463 HOSPITAL OUTPT CLINIC VISIT: HCPCS | Performed by: FAMILY MEDICINE

## 2020-09-15 PROCEDURE — G0439 PPPS, SUBSEQ VISIT: HCPCS | Performed by: FAMILY MEDICINE

## 2020-09-15 PROCEDURE — 99214 OFFICE O/P EST MOD 30 MIN: CPT | Performed by: FAMILY MEDICINE

## 2020-09-15 PROCEDURE — G9231 DOC ESRD DIA TRANS PREG: HCPCS | Performed by: FAMILY MEDICINE

## 2020-09-15 PROCEDURE — G8427 DOCREV CUR MEDS BY ELIG CLIN: HCPCS | Performed by: FAMILY MEDICINE

## 2020-09-15 PROCEDURE — 90686 IIV4 VACC NO PRSV 0.5 ML IM: CPT

## 2020-09-15 PROCEDURE — G0444 DEPRESSION SCREEN ANNUAL: HCPCS | Performed by: FAMILY MEDICINE

## 2020-09-15 PROCEDURE — 85610 PROTHROMBIN TIME: CPT | Performed by: FAMILY MEDICINE

## 2020-09-15 PROCEDURE — 3017F COLORECTAL CA SCREEN DOC REV: CPT | Performed by: FAMILY MEDICINE

## 2020-09-15 PROCEDURE — G8417 CALC BMI ABV UP PARAM F/U: HCPCS | Performed by: FAMILY MEDICINE

## 2020-09-15 PROCEDURE — G9899 SCRN MAM PERF RSLTS DOC: HCPCS | Performed by: FAMILY MEDICINE

## 2020-09-15 PROCEDURE — G8510 SCR DEP NEG, NO PLAN REQD: HCPCS | Performed by: FAMILY MEDICINE

## 2020-09-15 RX ORDER — OXYCODONE AND ACETAMINOPHEN 7.5; 325 MG/1; MG/1
1 TABLET ORAL
Qty: 90 TAB | Refills: 0 | Status: SHIPPED | OUTPATIENT
Start: 2020-09-15 | End: 2020-12-18 | Stop reason: SDUPTHER

## 2020-09-15 RX ORDER — OXYCODONE AND ACETAMINOPHEN 7.5; 325 MG/1; MG/1
1 TABLET ORAL
Qty: 90 TAB | Refills: 0 | Status: SHIPPED | OUTPATIENT
Start: 2020-10-15 | End: 2020-12-18 | Stop reason: SDUPTHER

## 2020-09-15 RX ORDER — OXYCODONE AND ACETAMINOPHEN 7.5; 325 MG/1; MG/1
1 TABLET ORAL 3 TIMES DAILY
Qty: 90 TAB | Refills: 0 | Status: SHIPPED | OUTPATIENT
Start: 2020-11-14 | End: 2020-12-18 | Stop reason: SDUPTHER

## 2020-09-15 NOTE — PATIENT INSTRUCTIONS
A Healthy Lifestyle: Care Instructions  Your Care Instructions     A healthy lifestyle can help you feel good, stay at a healthy weight, and have plenty of energy for both work and play. A healthy lifestyle is something you can share with your whole family. A healthy lifestyle also can lower your risk for serious health problems, such as high blood pressure, heart disease, and diabetes. You can follow a few steps listed below to improve your health and the health of your family. Follow-up care is a key part of your treatment and safety. Be sure to make and go to all appointments, and call your doctor if you are having problems. It's also a good idea to know your test results and keep a list of the medicines you take. How can you care for yourself at home? · Do not eat too much sugar, fat, or fast foods. You can still have dessert and treats now and then. The goal is moderation. · Start small to improve your eating habits. Pay attention to portion sizes, drink less juice and soda pop, and eat more fruits and vegetables. ? Eat a healthy amount of food. A 3-ounce serving of meat, for example, is about the size of a deck of cards. Fill the rest of your plate with vegetables and whole grains. ? Limit the amount of soda and sports drinks you have every day. Drink more water when you are thirsty. ? Eat at least 5 servings of fruits and vegetables every day. It may seem like a lot, but it is not hard to reach this goal. A serving or helping is 1 piece of fruit, 1 cup of vegetables, or 2 cups of leafy, raw vegetables. Have an apple or some carrot sticks as an afternoon snack instead of a candy bar. Try to have fruits and/or vegetables at every meal.  · Make exercise part of your daily routine. You may want to start with simple activities, such as walking, bicycling, or slow swimming. Try to be active 30 to 60 minutes every day. You do not need to do all 30 to 60 minutes all at once.  For example, you can exercise 3 times a day for 10 or 20 minutes. Moderate exercise is safe for most people, but it is always a good idea to talk to your doctor before starting an exercise program.  · Keep moving. Eileen Balls the lawn, work in the garden, or [employer]. Take the stairs instead of the elevator at work. · If you smoke, quit. People who smoke have an increased risk for heart attack, stroke, cancer, and other lung illnesses. Quitting is hard, but there are ways to boost your chance of quitting tobacco for good. ? Use nicotine gum, patches, or lozenges. ? Ask your doctor about stop-smoking programs and medicines. ? Keep trying. In addition to reducing your risk of diseases in the future, you will notice some benefits soon after you stop using tobacco. If you have shortness of breath or asthma symptoms, they will likely get better within a few weeks after you quit. · Limit how much alcohol you drink. Moderate amounts of alcohol (up to 2 drinks a day for men, 1 drink a day for women) are okay. But drinking too much can lead to liver problems, high blood pressure, and other health problems. Family health  If you have a family, there are many things you can do together to improve your health. · Eat meals together as a family as often as possible. · Eat healthy foods. This includes fruits, vegetables, lean meats and dairy, and whole grains. · Include your family in your fitness plan. Most people think of activities such as jogging or tennis as the way to fitness, but there are many ways you and your family can be more active. Anything that makes you breathe hard and gets your heart pumping is exercise. Here are some tips:  ? Walk to do errands or to take your child to school or the bus.  ? Go for a family bike ride after dinner instead of watching TV. Where can you learn more?   Go to http://lindsay-kai.info/  Enter P494 in the search box to learn more about \"A Healthy Lifestyle: Care Instructions. \"  Current as of: January 31, 2020               Content Version: 12.6  © 4574-1642 [employer]. Care instructions adapted under license by [employer] (which disclaims liability or warranty for this information). If you have questions about a medical condition or this instruction, always ask your healthcare professional. [address] any warranty or liability for your use of this information. Medicare Wellness Visit, Female     The best way to live healthy is to have a lifestyle where you eat a well-balanced diet, exercise regularly, limit alcohol use, and quit all forms of tobacco/nicotine, if applicable. Regular preventive services are another way to keep healthy. Preventive services (vaccines, screening tests, monitoring & exams) can help personalize your care plan, which helps you manage your own care. Screening tests can find health problems at the earliest stages, when they are easiest to treat. [facility] follows the current, evidence-based guidelines published by the [facility] Max Ledezma (USPSTF) when recommending preventive services for our patients. Because we follow these guidelines, sometimes recommendations change over time as research supports it. (For example, mammograms used to be recommended annually. Even though Medicare will still pay for an annual mammogram, the newer guidelines recommend a mammogram every two years for women of average risk). Of course, you and your doctor may decide to screen more often for some diseases, based on your risk and your co-morbidities (chronic disease you are already diagnosed with). Preventive services for you include:  - Medicare offers their members a free annual wellness visit, which is time for you and your primary care provider to discuss and plan for your preventive service needs.  Take advantage of this benefit every year!  -All adults over the age of 72 should receive the recommended pneumonia vaccines. Current USPSTF guidelines recommend a series of two vaccines for the best pneumonia protection.   -All adults should have a flu vaccine yearly and a tetanus vaccine every 10 years.   -All adults age 48 and older should receive the shingles vaccines (series of two vaccines). -All adults age 38-68 who are overweight should have a diabetes screening test once every three years.   -All adults born between [birth year] and 1963 should be screened once for Hepatitis C.  -Other screening tests and preventive services for persons with diabetes include: an eye exam to screen for diabetic retinopathy, a kidney function test, a foot exam, and stricter control over your cholesterol.   -Cardiovascular screening for adults with routine risk involves an electrocardiogram (ECG) at intervals determined by your doctor.   -Colorectal cancer screenings should be done for adults age 54-65 with no increased risk factors for colorectal cancer. There are a number of acceptable methods of screening for this type of cancer. Each test has its own benefits and drawbacks. Discuss with your doctor what is most appropriate for you during your annual wellness visit. The different tests include: colonoscopy (considered the best screening method), a fecal occult blood test, a fecal DNA test, and sigmoidoscopy.    -A bone mass density test is recommended when a woman turns 65 to screen for osteoporosis. This test is only recommended one time, as a screening. Some providers will use this same test as a disease monitoring tool if you already have osteoporosis. -Breast cancer screenings are recommended every other year for women of normal risk, age 54-69.  -Cervical cancer screenings for women over age 72 are only recommended with certain risk factors.      Here is a list of your current Health Maintenance items (your personalized list of preventive services) with a due date:  Health Maintenance Due   Topic Date Due    Pneumococcal Vaccine (1 of 3 - PCV13) 12/14/1963    Diabetic Foot Care  12/14/1967    DTaP/Tdap/Td  (1 - Tdap) 12/14/1978    Pap Test  12/14/1978    Shingles Vaccine (1 of 2) 12/14/2007    Mammogram  12/14/2007    Colon Cancer Stool Test  02/18/2018    Annual Well Visit  03/12/2020    Hemoglobin A1C    03/12/2020    Cholesterol Test   03/12/2020    Yearly Flu Vaccine (1) 09/01/2020

## 2020-09-15 NOTE — PROGRESS NOTES
Chief Complaint   Patient presents with    Follow-up     INR check      Patient presents in office today for INR check,  Would like to get a flu shot today. Needs a refill of Percocet. No concerns. 1. Have you been to the ER, urgent care clinic since your last visit? Hospitalized since your last visit? No    2. Have you seen or consulted any other health care providers outside of the [address] [place] [address] since your last visit? Include any pap smears or colon screening.  No    Learning Assessment 6/28/2019   PRIMARY LEARNER Patient   PRIMARY LANGUAGE ENGLISH   LEARNER PREFERENCE PRIMARY LISTENING   ANSWERED BY patient    RELATIONSHIP SELF

## 2020-09-15 NOTE — PROGRESS NOTES
Progress Note    she is a 60y.o. year old female who presents for evalution. Subjective:     Patient here for refill of Percocet 7.5/325. This is taken 3 times a day as needed for pain, pain is from chronic knee, chronic back, chronic chest pain. Patient has been well controlled on this medication. Brings pain from an 8 to a 3-4 out of 10. No history of drug abuse. No history of misuse of medication. Patient is due for drug screen. Patient does have multiple chronic health conditions which disable her. Medication does help with quality of life and to be more active around her home. Last percocet was last night. Opioid/Pain Management:    1. Has the patient signed a pain contract for chronic narcotic use? yes  2. Has the patient had a UDS or Serum screen as per guidelines as per [facility]?:   yes    3. Does patient meet necessary guidelines for Naloxone treatement per the [facility]?:    no  4. Has the Prescription Monitoring Program been reviewed? yes  5. Does patient have a long term condition that requires long term use of a Narcotic?  yes  6. Has patient been tolerant of therapy and responsible to routine follow up and specialist follow-up? Yes    Patient also for recheck of INR currently at 3.1. Will have patient skip 2 days then continue her current dosing and recheck in 1 month. Reviewed PmHx, RxHx, FmHx, SocHx, AllgHx and updated and dated in the chart. Review of Systems - negative except as listed above in the HPI    Objective:     Vitals:    09/15/20 1328   BP: 129/73   Pulse: 80   Resp: 16   Temp: 97.8 °F (36.6 °C)   TempSrc: Oral   SpO2: 95%   Weight: 288 lb 12.8 oz (131 kg)   Height: 5' 10\" (1.778 m)       Current Outpatient Medications   Medication Sig    [START ON 11/14/2020] oxyCODONE-acetaminophen (PERCOCET 7.5) 7.5-325 mg per tablet Take 1 Tab by mouth three (3) times daily for 30 days. Max Daily Amount: 3 Tabs.     [START ON 10/15/2020] oxyCODONE-acetaminophen (PERCOCET 7.5) 7.5-325 mg per tablet Take 1 Tab by mouth every eight (8) hours as needed for Pain for up to 30 days. Max Daily Amount: 3 Tabs.  oxyCODONE-acetaminophen (PERCOCET 7.5) 7.5-325 mg per tablet Take 1 Tab by mouth every eight (8) hours as needed for Pain for up to 30 days. Max Daily Amount: 3 Tabs. Please fill on or after 7/19/20.  pantoprazole (PROTONIX) 40 mg tablet TAKE 1 TABLET BY MOUTH TWICE DAILY FOR HEARTBURN    sucralfate (CARAFATE) 1 gram tablet Take 1 tablet by mouth 4 times daily    atorvastatin (LIPITOR) 80 mg tablet TAKE 1 TABLET BY MOUTH ONCE DAILY NIGHTLY    carvediloL (COREG) 25 mg tablet TAKE 1 & 1/2 (ONE & ONE-HALF) TABLETS BY MOUTH TWICE DAILY WITH MEALS    terbinafine HCL (LAMISIL) 250 mg tablet Take 1 Tab by mouth daily for 60 days.  warfarin (COUMADIN) 2.5 mg tablet Take 1 Tab by mouth every other day.  warfarin (COUMADIN) 3 mg tablet Take 1 Tab by mouth every other day.  levocetirizine (XYZAL) 5 mg tablet Take 1 Tab by mouth daily.  isosorbide mononitrate ER (IMDUR) 120 mg CR tablet Take 1 tablet by mouth once daily    docusate sodium (STOOL SOFTENER PO) Take  by mouth daily.  nitroglycerin (NITROSTAT) 0.4 mg SL tablet 1 Tab by SubLINGual route every five (5) minutes as needed for Chest Pain. Up to 3 doses.  ergocalciferol (Vitamin D2) 1,250 mcg (50,000 unit) capsule Take 1 Cap by mouth every Tuesday.  allopurinoL (ZYLOPRIM) 100 mg tablet Take 1 tablet by mouth once daily    naloxone (NARCAN) 4 mg/actuation nasal spray Use 1 spray intranasally, then discard. Repeat with new spray every 2 min as needed for opioid overdose symptoms, alternating nostrils.  amLODIPine (NORVASC) 10 mg tablet Take 1 Tab by mouth daily. Indications: high blood pressure    albuterol (PROAIR HFA) 90 mcg/actuation inhaler Take 2 Puffs by inhalation every four (4) hours as needed for Wheezing.     Oxygen O2 2L NC 24/7     No current facility-administered medications for this visit. Physical Examination: General appearance - alert, well appearing, and in no distress  Chest - clear to auscultation, no wheezes, rales or rhonchi, symmetric air entry  Heart - normal rate, regular rhythm, normal S1, S2, no murmurs, rubs, clicks or gallops  Extremities - feet normal, good pulses, normal color, temperature and sensation, monofilament sensory exam is normal in both feet      Assessment/ Plan:   Diagnoses and all orders for this visit:    1. Encounter for monitoring Coumadin therapy  -     AMB POC PT/INR    2. Chronic bilateral low back pain, unspecified whether sciatica present  -     DRUG SCREEN-10 W/CONFIRM, SERUM; Future  -     oxyCODONE-acetaminophen (PERCOCET 7.5) 7.5-325 mg per tablet; Take 1 Tab by mouth three (3) times daily for 30 days. Max Daily Amount: 3 Tabs. -     oxyCODONE-acetaminophen (PERCOCET 7.5) 7.5-325 mg per tablet; Take 1 Tab by mouth every eight (8) hours as needed for Pain for up to 30 days. Max Daily Amount: 3 Tabs. -     oxyCODONE-acetaminophen (PERCOCET 7.5) 7.5-325 mg per tablet; Take 1 Tab by mouth every eight (8) hours as needed for Pain for up to 30 days. Max Daily Amount: 3 Tabs. Please fill on or after 7/19/20.    3. Chronic pain of right knee  -     DRUG SCREEN-10 W/CONFIRM, SERUM; Future  -     oxyCODONE-acetaminophen (PERCOCET 7.5) 7.5-325 mg per tablet; Take 1 Tab by mouth three (3) times daily for 30 days. Max Daily Amount: 3 Tabs. -     oxyCODONE-acetaminophen (PERCOCET 7.5) 7.5-325 mg per tablet; Take 1 Tab by mouth every eight (8) hours as needed for Pain for up to 30 days. Max Daily Amount: 3 Tabs. -     oxyCODONE-acetaminophen (PERCOCET 7.5) 7.5-325 mg per tablet; Take 1 Tab by mouth every eight (8) hours as needed for Pain for up to 30 days. Max Daily Amount: 3 Tabs. Please fill on or after 7/19/20.    4. DM type 2 causing vascular disease ([facility]  -     HEMOGLOBIN A1C WITH EAG; Future    5.  Medicare annual wellness visit, subsequent    6. Screening for depression  -     DEPRESSION SCREEN ANNUAL    7. Encounter for screening mammogram for malignant neoplasm of breast  -     [facility] 3D THERESA W MAMMO BI SCREENING INCL CAD; Future    8. Comprehensive diabetic foot examination, type 2 DM, encounter for ([facility]  -      DIABETES FOOT EXAM    9. Needs flu shot  -     INFLUENZA VIRUS VAC QUAD,SPLIT,PRESV FREE SYRINGE IM    Patient will come in fasting for follow-up next month. Due for lipid panel  Follow-up and Dispositions    · Return in about 4 weeks (around 10/13/2020), or if symptoms worsen or fail to improve. I have discussed the diagnosis with the patient and the intended plan as seen in the above orders. The patient has received an after-visit summary and questions were answered concerning future plans. Pt conveyed understanding of plan. Medication Side Effects and Warnings were discussed with patient      Becky Arnett, DO      This is the Subsequent Medicare Annual Wellness Exam, performed 12 months or more after the Initial AWV or the last Subsequent AWV    I have reviewed the patient's medical history in detail and updated the computerized patient record.      History     Patient Active Problem List   Diagnosis Code    Coronary artery disease I25.10    JASEN on CPAP G47.33, Z99.89    Pulmonary hypertension (HCC) I27.20    HTN (hypertension) I10    Morbid obesity E66.01    Gastroparesis K31.84    Normocytic anemia D64.9    DM (diabetes mellitus), type 2 with renal complications ([facility] E59.68    COPD, severe ([facility] J44.9    ILD (interstitial lung disease) ([facility]) J84.9    Warfarin anticoagulation Z79.01    S/P left pulmonary artery pressure sensor implant placement Z95.9    Hx of deep venous thrombosis Z86.718    Sleep apnea G47.30    Diabetic gastroparesis (HCC) E11.43, K31.84    GERD (gastroesophageal reflux disease) K21.9    Rheumatoid arteritis (HCC) M05.20    DM type 2 causing neurological disease ([facility] E11.49    DM type 2 causing renal disease ([facility] E11.29    DM type 2 causing vascular disease ([facility] E11.59    Gout M10.9    Limited mobility Z74.09    ESRD on hemodialysis ([facility] N18.6, Z99.2    (HFpEF) heart failure with preserved ejection fraction (HCC) I50.30    Nephrolithiasis N20.0    PVC (premature ventricular contraction) I49.3    S/P ORIF (open reduction internal fixation) fracture Z98.890, Z87.81    ESRD (end stage renal disease) ([facility]) N18.6    Chronic anticoagulation Z79.01    Arthralgia M25.50    Coronary stent patent Z95.5    Edema of both lower extremities R60.0    Fatigue R53.83    Hematuria R31.9    Hypervolemia E87.70    Infected dental caries K02.9, K04.7    Loss of appetite R63.0    End stage renal failure on dialysis (HCC) N18.6, Z99.2    Flank pain R10.9    Hypoxia R09.02    Nausea and vomiting R11.2    Infection of total right knee replacement ([facility]) T84.53XA     Past Medical History:   Diagnosis Date     Sleep Apnea 2/16/2011    Compliant with c-pap.  Aortic aneurysm ([facility])     Abdominal    CAD (coronary Artery Disease) Native Artery 2/16/2011    Chronic kidney disease     Hemodiaylsis  3x/week    CKD (chronic kidney disease) stage 4, GFR 15-29 ml/min ([facility]) 2/10/2017    Coagulation disorder ([facility] 06/2018    Anemia  Last blood Transfusion    COPD (chronic obstructive pulmonary disease) ([facility])     Diabetic gastroparesis ([facility])     Diastolic heart failure ([facility] 10/5/2012    DM type 2 causing neurological disease ([facility]     DM type 2 causing renal disease ([facility])     Insulin SS at night only PRN    DM type 2 causing vascular disease ([facility]     Esophageal stricture 2012    dialted Dr. Karen Schmidt G6PD deficiency     trait    GERD (gastroesophageal reflux disease)     Gout     Hemodialysis access, AV graft ([facility] 04/02/2017    Dialysis MWF    [address]      Hypertension     ILD (interstitial lung disease) ([facility]     open lung bx CJW 2010    Morbid obesity ([facility]     OA (osteoarthritis)     Ovarian cancer ([facility]     cervical and uterine    Rheumatoid arteritis (HCC)     S/P left pulmonary artery pressure sensor implant placement 8/31/2017    Cardiomems     Thromboembolus ([facility]     Right calf     Tobacco use disorder 3/21/2012    Uterine cervix cancer ([facility]     Vitamin D deficiency 8/9/2013      Past Surgical History:   Procedure Laterality Date    CARDIAC SURG PROCEDURE UNLIST  02/2019    x4 with last sttent placed 2/2019.  CHEST SURGERY PROCEDURE UNLISTED Right     > 20 years ago  RLL removed     COLONOSCOPY N/A 6/24/2016    COLONOSCOPY performed by Sushil Gerber MD at [address] [facility] HX ADENOIDECTOMY      HX APPENDECTOMY      HX CARPAL TUNNEL RELEASE      bilateral    HX CHOLECYSTECTOMY      HX HEART CATHETERIZATION      x 7    HX HERNIA REPAIR      HX HYSTERECTOMY      HX ORTHOPAEDIC Right 11/12/12    right knee replacement    HX ORTHOPAEDIC Bilateral     carpal tunnel repair    HX SALPINGO-OOPHORECTOMY      HX TONSIL AND ADENOIDECTOMY      HX TONSILLECTOMY      HX VASCULAR ACCESS Left     Left lower arm AV fistula    UPPER GI ENDOSCOPY,DILATN W GUIDE  6/24/2016          Current Outpatient Medications   Medication Sig Dispense Refill    [START ON 11/14/2020] oxyCODONE-acetaminophen (PERCOCET 7.5) 7.5-325 mg per tablet Take 1 Tab by mouth three (3) times daily for 30 days. Max Daily Amount: 3 Tabs. 90 Tab 0    [START ON 10/15/2020] oxyCODONE-acetaminophen (PERCOCET 7.5) 7.5-325 mg per tablet Take 1 Tab by mouth every eight (8) hours as needed for Pain for up to 30 days. Max Daily Amount: 3 Tabs. 90 Tab 0    oxyCODONE-acetaminophen (PERCOCET 7.5) 7.5-325 mg per tablet Take 1 Tab by mouth every eight (8) hours as needed for Pain for up to 30 days. Max Daily Amount: 3 Tabs.  Please fill on or after 7/19/20. 90 Tab 0    pantoprazole (PROTONIX) 40 mg tablet TAKE 1 TABLET BY MOUTH TWICE DAILY FOR HEARTBURN 60 Tab 0  sucralfate (CARAFATE) 1 gram tablet Take 1 tablet by mouth 4 times daily 120 Tab 0    atorvastatin (LIPITOR) 80 mg tablet TAKE 1 TABLET BY MOUTH ONCE DAILY NIGHTLY 30 Tab 0    carvediloL (COREG) 25 mg tablet TAKE 1 & 1/2 (ONE & ONE-HALF) TABLETS BY MOUTH TWICE DAILY WITH MEALS 270 Tab 1    terbinafine HCL (LAMISIL) 250 mg tablet Take 1 Tab by mouth daily for 60 days. 30 Tab 1    warfarin (COUMADIN) 2.5 mg tablet Take 1 Tab by mouth every other day. 90 Tab 3    warfarin (COUMADIN) 3 mg tablet Take 1 Tab by mouth every other day.  levocetirizine (XYZAL) 5 mg tablet Take 1 Tab by mouth daily. 30 Tab 5    isosorbide mononitrate ER (IMDUR) 120 mg CR tablet Take 1 tablet by mouth once daily 90 Tab 1    docusate sodium (STOOL SOFTENER PO) Take  by mouth daily.  nitroglycerin (NITROSTAT) 0.4 mg SL tablet 1 Tab by SubLINGual route every five (5) minutes as needed for Chest Pain. Up to 3 doses. 1 Bottle 1    ergocalciferol (Vitamin D2) 1,250 mcg (50,000 unit) capsule Take 1 Cap by mouth every Tuesday. 12 Cap 3    allopurinoL (ZYLOPRIM) 100 mg tablet Take 1 tablet by mouth once daily 30 Tab 11    naloxone (NARCAN) 4 mg/actuation nasal spray Use 1 spray intranasally, then discard. Repeat with new spray every 2 min as needed for opioid overdose symptoms, alternating nostrils. 1 Each 5    amLODIPine (NORVASC) 10 mg tablet Take 1 Tab by mouth daily. Indications: high blood pressure 90 Tab 3    albuterol (PROAIR HFA) 90 mcg/actuation inhaler Take 2 Puffs by inhalation every four (4) hours as needed for Wheezing.  1 Inhaler 5    Oxygen O2 2L NC 24/7       Allergies   Allergen Reactions    Contrast Dye [Iodine] Anaphylaxis     Tolerates when pre medicated w/ IV solumedrol and benadryl prior to procedure     Levaquin [Levofloxacin] Nausea and Vomiting    Morphine Hives and Itching       Family History   Problem Relation Age of Onset    Heart Disease Mother     Heart Disease Brother      Social History Tobacco Use    Smoking status: Former Smoker     Packs/day: 0.50     Years: 41.00     Pack years: 20.50     Types: Cigarettes     Last attempt to quit: 2014     Years since quittin.8    Smokeless tobacco: Never Used   Substance Use Topics    Alcohol use: No       Depression Risk Factor Screening:     3 most recent PHQ Screens 9/15/2020   Little interest or pleasure in doing things Not at all   Feeling down, depressed, irritable, or hopeless Not at all   Total Score PHQ 2 0       Alcohol Risk Screen   Do you average more than 1 drink per night or more than 7 drinks a week:  No    On any one occasion in the past three months have you have had more than 3 drinks containing alcohol:  No        Functional Ability and Level of Safety:   Hearing: Hearing is good. Activities of Daily Living: The home contains: no safety equipment. Patient does total self care     Ambulation: with difficulty, uses a rollator     Fall Risk:  No flowsheet data found. Abuse Screen:  Patient is not abused       Cognitive Screening   Has your family/caregiver stated any concerns about your memory: no         Patient Care Team   Patient Care Team:  Katiuska Tomlin DO as PCP - General (Family Medicine)  Katiuska Tomlin DO as PCP - RE[facility]ABILITATION [facility]  Christianne Parekh MD (Endocrinology)  Jennifer Paul MD (Orthopedic Surgery)  Angela Thomas MD as Consulting Provider (Cardiology)  Uzma Ortiz MD as Consulting Provider (Nephrology)  Angelika Prescott DO as Consulting Provider (Pulmonary Disease)  Quique Martinez MD as Consulting Provider (Vascular Surgery)  Michael Amaro MD as Physician (Cardiology)    Assessment/Plan   Education and counseling provided:  Are appropriate based on today's review and evaluation    Diagnoses and all orders for this visit:    1. Encounter for monitoring Coumadin therapy  -     AMB POC PT/INR    2.  Chronic bilateral low back pain, unspecified whether sciatica present  - DRUG SCREEN-10 W/CONFIRM, SERUM; Future  -     oxyCODONE-acetaminophen (PERCOCET 7.5) 7.5-325 mg per tablet; Take 1 Tab by mouth three (3) times daily for 30 days. Max Daily Amount: 3 Tabs. -     oxyCODONE-acetaminophen (PERCOCET 7.5) 7.5-325 mg per tablet; Take 1 Tab by mouth every eight (8) hours as needed for Pain for up to 30 days. Max Daily Amount: 3 Tabs. -     oxyCODONE-acetaminophen (PERCOCET 7.5) 7.5-325 mg per tablet; Take 1 Tab by mouth every eight (8) hours as needed for Pain for up to 30 days. Max Daily Amount: 3 Tabs. Please fill on or after 7/19/20.    3. Chronic pain of right knee  -     DRUG SCREEN-10 W/CONFIRM, SERUM; Future  -     oxyCODONE-acetaminophen (PERCOCET 7.5) 7.5-325 mg per tablet; Take 1 Tab by mouth three (3) times daily for 30 days. Max Daily Amount: 3 Tabs. -     oxyCODONE-acetaminophen (PERCOCET 7.5) 7.5-325 mg per tablet; Take 1 Tab by mouth every eight (8) hours as needed for Pain for up to 30 days. Max Daily Amount: 3 Tabs. -     oxyCODONE-acetaminophen (PERCOCET 7.5) 7.5-325 mg per tablet; Take 1 Tab by mouth every eight (8) hours as needed for Pain for up to 30 days. Max Daily Amount: 3 Tabs. Please fill on or after 7/19/20.    4. DM type 2 causing vascular disease [facility]  -     HEMOGLOBIN A1C WITH EAG; Future    5. Medicare annual wellness visit, subsequent    6. Screening for depression  -     DEPRESSION SCREEN ANNUAL    7. Encounter for screening mammogram for malignant neoplasm of breast  -     [facility] 3D THERESA W MAMMO BI SCREENING INCL CAD; Future    8. Comprehensive diabetic foot examination, type 2 DM, encounter for ([facility]  -      DIABETES FOOT EXAM    9.  Needs flu shot  -     INFLUENZA VIRUS VAC QUAD,SPLIT,PRESV FREE SYRINGE IM        Health Maintenance Due   Topic Date Due    Pneumococcal 0-64 years (1 of 3 - PCV13) 12/14/1963    Foot Exam Q1  12/14/1967    DTaP/Tdap/Td series (1 - Tdap) 12/14/1978    PAP AKA CERVICAL CYTOLOGY  12/14/1978    Shingrix Vaccine Age 50> (1 of 2) 12/14/2007    Breast Cancer Screen Mammogram  12/14/2007    FOBT Q1Y Age 54-65  02/18/2018    Medicare Yearly Exam  03/12/2020    A1C test (Diabetic or Prediabetic)  03/12/2020    Lipid Screen  03/12/2020    Flu Vaccine (1) 09/01/2020     I have discussed the diagnosis with the patient and the intended plan as seen in the above orders. The patient has received an after-visit summary and questions were answered concerning future plans. Pt conveyed understanding of plan.       Dr Marvel Aponte

## 2020-09-17 ENCOUNTER — DOCUMENTATION ONLY (OUTPATIENT)
Dept: FAMILY MEDICINE CLINIC | Age: 65
End: 2020-09-17

## 2020-09-17 LAB
EST. AVERAGE GLUCOSE BLD GHB EST-MCNC: 88 MG/DL
HBA1C MFR BLD: 4.7 % (ref 4–5.6)

## 2020-09-17 NOTE — PROGRESS NOTES
Faxed pre op form to gastro specialist @ [phone]. Confirmation number [id number]. Original form placed in scan folder for central scanning.

## 2020-09-18 DIAGNOSIS — Z12.31 ENCOUNTER FOR SCREENING MAMMOGRAM FOR MALIGNANT NEOPLASM OF BREAST: ICD-10-CM

## 2020-09-24 ENCOUNTER — DOCUMENTATION ONLY (OUTPATIENT)
Dept: FAMILY MEDICINE CLINIC | Age: 65
End: 2020-09-24

## 2020-09-24 LAB
BENZODIAZEPINES, 791542: NEGATIVE NG/ML
MEPERIDINE SERPLBLD-MCNC: NEGATIVE NG/ML
MEPERIDINE SERPLBLD-MCNC: NEGATIVE UG/ML
METHADONE, 791633: NEGATIVE NG/ML
O-NORTRAMADOL BLD-MCNC: NEGATIVE NG/ML
OPIATES: NEGATIVE NG/ML
OXYCODONES, 738315: ABNORMAL NG/ML
PROPOXYPHENE, 791635: NEGATIVE NG/ML
THC (MARIJUANA) METABOLITE, 761546: NEGATIVE NG/ML
TRAMADOL BLD-MCNC: NEGATIVE NG/ML

## 2020-09-28 ENCOUNTER — DOCUMENTATION ONLY (OUTPATIENT)
Dept: FAMILY MEDICINE CLINIC | Age: 65
End: 2020-09-28

## 2020-09-29 ENCOUNTER — HOSPITAL ENCOUNTER (OUTPATIENT)
Dept: MAMMOGRAPHY | Age: 65
Discharge: HOME | End: 2020-09-29
Attending: FAMILY MEDICINE
Payer: MEDICARE

## 2020-09-29 PROCEDURE — 77063 BREAST TOMOSYNTHESIS BI: CPT

## 2020-10-01 DIAGNOSIS — I25.118 ATHEROSCLEROSIS OF NATIVE CORONARY ARTERY OF NATIVE HEART WITH STABLE ANGINA PECTORIS (HCC): Chronic | ICD-10-CM

## 2020-10-01 RX ORDER — ATORVASTATIN CALCIUM 80 MG/1
TABLET, FILM COATED ORAL
Qty: 90 TAB | Refills: 3 | Status: SHIPPED | OUTPATIENT
Start: 2020-10-01 | End: 2021-11-16 | Stop reason: SDUPTHER

## 2020-10-02 ENCOUNTER — VIRTUAL VISIT (OUTPATIENT)
Dept: FAMILY MEDICINE CLINIC | Age: 65
End: 2020-10-02
Payer: MEDICARE

## 2020-10-02 DIAGNOSIS — D73.89 SPLENIC LESION: Primary | ICD-10-CM

## 2020-10-02 PROCEDURE — 99213 OFFICE O/P EST LOW 20 MIN: CPT | Performed by: FAMILY MEDICINE

## 2020-10-02 PROCEDURE — G8432 DEP SCR NOT DOC, RNG: HCPCS | Performed by: FAMILY MEDICINE

## 2020-10-02 PROCEDURE — G9899 SCRN MAM PERF RSLTS DOC: HCPCS | Performed by: FAMILY MEDICINE

## 2020-10-02 PROCEDURE — G8428 CUR MEDS NOT DOCUMENT: HCPCS | Performed by: FAMILY MEDICINE

## 2020-10-02 PROCEDURE — 3017F COLORECTAL CA SCREEN DOC REV: CPT | Performed by: FAMILY MEDICINE

## 2020-10-02 PROCEDURE — G9231 DOC ESRD DIA TRANS PREG: HCPCS | Performed by: FAMILY MEDICINE

## 2020-10-02 NOTE — PATIENT INSTRUCTIONS
A Healthy Lifestyle: Care Instructions  Your Care Instructions     A healthy lifestyle can help you feel good, stay at a healthy weight, and have plenty of energy for both work and play. A healthy lifestyle is something you can share with your whole family. A healthy lifestyle also can lower your risk for serious health problems, such as high blood pressure, heart disease, and diabetes. You can follow a few steps listed below to improve your health and the health of your family. Follow-up care is a key part of your treatment and safety. Be sure to make and go to all appointments, and call your doctor if you are having problems. It's also a good idea to know your test results and keep a list of the medicines you take. How can you care for yourself at home? · Do not eat too much sugar, fat, or fast foods. You can still have dessert and treats now and then. The goal is moderation. · Start small to improve your eating habits. Pay attention to portion sizes, drink less juice and soda pop, and eat more fruits and vegetables. ? Eat a healthy amount of food. A 3-ounce serving of meat, for example, is about the size of a deck of cards. Fill the rest of your plate with vegetables and whole grains. ? Limit the amount of soda and sports drinks you have every day. Drink more water when you are thirsty. ? Eat at least 5 servings of fruits and vegetables every day. It may seem like a lot, but it is not hard to reach this goal. A serving or helping is 1 piece of fruit, 1 cup of vegetables, or 2 cups of leafy, raw vegetables. Have an apple or some carrot sticks as an afternoon snack instead of a candy bar. Try to have fruits and/or vegetables at every meal.  · Make exercise part of your daily routine. You may want to start with simple activities, such as walking, bicycling, or slow swimming. Try to be active 30 to 60 minutes every day. You do not need to do all 30 to 60 minutes all at once.  For example, you can exercise 3 times a day for 10 or 20 minutes. Moderate exercise is safe for most people, but it is always a good idea to talk to your doctor before starting an exercise program.  · Keep moving. [employer] the lawn, work in the garden, or [employer]. Take the stairs instead of the elevator at work. · If you smoke, quit. People who smoke have an increased risk for heart attack, stroke, cancer, and other lung illnesses. Quitting is hard, but there are ways to boost your chance of quitting tobacco for good. ? Use nicotine gum, patches, or lozenges. ? Ask your doctor about stop-smoking programs and medicines. ? Keep trying. In addition to reducing your risk of diseases in the future, you will notice some benefits soon after you stop using tobacco. If you have shortness of breath or asthma symptoms, they will likely get better within a few weeks after you quit. · Limit how much alcohol you drink. Moderate amounts of alcohol (up to 2 drinks a day for men, 1 drink a day for women) are okay. But drinking too much can lead to liver problems, high blood pressure, and other health problems. Family health  If you have a family, there are many things you can do together to improve your health. · Eat meals together as a family as often as possible. · Eat healthy foods. This includes fruits, vegetables, lean meats and dairy, and whole grains. · Include your family in your fitness plan. Most people think of activities such as jogging or tennis as the way to fitness, but there are many ways you and your family can be more active. Anything that makes you breathe hard and gets your heart pumping is exercise. Here are some tips:  ? Walk to do errands or to take your child to school or the bus.  ? Go for a family bike ride after dinner instead of watching TV. Where can you learn more?   Go to http://www.gray.com/  Enter R501 in the search box to learn more about \"A Healthy Lifestyle: Care Instructions. \"  Current as of: January 31, 2020               Content Version: 12.6  © 8325-8469 [employer]. Care instructions adapted under license by [employer] (which disclaims liability or warranty for this information). If you have questions about a medical condition or this instruction, always ask your healthcare professional. [place] [address] any warranty or liability for your use of this information.

## 2020-10-02 NOTE — PROGRESS NOTES
Progress Note    she is a 60y.o. year old female who presents for evalution. Subjective:     Pt recently underwent CT by Gi due to abd pain. There was a complex renal cyst seen and this is stable from prior studies. Pt also with calcified granulomas of spleen which are new when compared to 2018 study. Pt is in dialysis, no exposure she is aware of to Tb, no hx of sarcoid. Does have pain in LUQ region tho. Was advised by Gi to f/u with me. Does have g6pd def and sickle cell trait. Reviewed PmHx, RxHx, FmHx, SocHx, AllgHx and updated and dated in the chart. Review of Systems - negative except as listed above in the HPI    Objective: There were no vitals filed for this visit. Current Outpatient Medications   Medication Sig    atorvastatin (LIPITOR) 80 mg tablet TAKE 1 TABLET BY MOUTH ONCE DAILY NIGHTLY    [START ON 11/14/2020] oxyCODONE-acetaminophen (PERCOCET 7.5) 7.5-325 mg per tablet Take 1 Tab by mouth three (3) times daily for 30 days. Max Daily Amount: 3 Tabs.  [START ON 10/15/2020] oxyCODONE-acetaminophen (PERCOCET 7.5) 7.5-325 mg per tablet Take 1 Tab by mouth every eight (8) hours as needed for Pain for up to 30 days. Max Daily Amount: 3 Tabs.  oxyCODONE-acetaminophen (PERCOCET 7.5) 7.5-325 mg per tablet Take 1 Tab by mouth every eight (8) hours as needed for Pain for up to 30 days. Max Daily Amount: 3 Tabs. Please fill on or after 7/19/20.  pantoprazole (PROTONIX) 40 mg tablet TAKE 1 TABLET BY MOUTH TWICE DAILY FOR HEARTBURN    sucralfate (CARAFATE) 1 gram tablet Take 1 tablet by mouth 4 times daily    carvediloL (COREG) 25 mg tablet TAKE 1 & 1/2 (ONE & ONE-HALF) TABLETS BY MOUTH TWICE DAILY WITH MEALS    terbinafine HCL (LAMISIL) 250 mg tablet Take 1 Tab by mouth daily for 60 days.  warfarin (COUMADIN) 2.5 mg tablet Take 1 Tab by mouth every other day.  warfarin (COUMADIN) 3 mg tablet Take 1 Tab by mouth every other day.     levocetirizine (XYZAL) 5 mg tablet Take 1 Tab by mouth daily.  isosorbide mononitrate ER (IMDUR) 120 mg CR tablet Take 1 tablet by mouth once daily    docusate sodium (STOOL SOFTENER PO) Take  by mouth daily.  nitroglycerin (NITROSTAT) 0.4 mg SL tablet 1 Tab by SubLINGual route every five (5) minutes as needed for Chest Pain. Up to 3 doses.  ergocalciferol (Vitamin D2) 1,250 mcg (50,000 unit) capsule Take 1 Cap by mouth every Tuesday.  allopurinoL (ZYLOPRIM) 100 mg tablet Take 1 tablet by mouth once daily    naloxone (NARCAN) 4 mg/actuation nasal spray Use 1 spray intranasally, then discard. Repeat with new spray every 2 min as needed for opioid overdose symptoms, alternating nostrils.  amLODIPine (NORVASC) 10 mg tablet Take 1 Tab by mouth daily. Indications: high blood pressure    albuterol (PROAIR HFA) 90 mcg/actuation inhaler Take 2 Puffs by inhalation every four (4) hours as needed for Wheezing.  Oxygen O2 2L NC 24/7     No current facility-administered medications for this visit. Physical Examination: General appearance - alert, well appearing, and in no distress  Mental status - alert, oriented to person, place, and time  Neurological - alert, oriented, normal speech, no focal findings or movement disorder noted      Assessment/ Plan:   Diagnoses and all orders for this visit:    1. Splenic lesion  -     CBC WITH AUTOMATED DIFF; Future  -     ANGIOTENSIN CONVERTING ENZYME; Future  -     QUANTIFERON-TB PLUS(CLIENT INCUB.); Future     pending labs consider specialist referral  Follow-up and Dispositions    · Return if symptoms worsen or fail to improve. I have discussed the diagnosis with the patient and the intended plan as seen in the above orders. The patient has received an after-visit summary and questions were answered concerning future plans. Pt conveyed understanding of plan.     Medication Side Effects and Warnings were discussed with patient      Savannah Phelan, [address] Velazquez is a 60 y.o. female being evaluated by a Virtual Visit (video visit) encounter to address concerns as mentioned above. A caregiver was present when appropriate. Due to this being a TeleHealth encounter (During PJPHN-05 public health emergency), evaluation of the following organ systems was limited: Vitals/Constitutional/EENT/Resp/CV/GI//MS/Neuro/Skin/Heme-Lymph-Imm. Pursuant to the emergency declaration under the [address] [place] [address], [address] [place] [address] authority and the [employer] Act, this Virtual Visit was conducted with patient's (and/or legal guardian's) consent, to reduce the risk of exposure to COVID-19 and provide necessary medical care. Services were provided through a video synchronous discussion virtually to substitute for in-person encounter. --Kaylie Turcios DO on 10/2/2020 at 2:08 PM    An electronic signature was used to authenticate this note.

## 2020-10-05 ENCOUNTER — HOSPITAL ENCOUNTER (OUTPATIENT)
Dept: LAB | Age: 65
Discharge: HOME | End: 2020-10-05
Payer: MEDICARE

## 2020-10-05 PROCEDURE — 86480 TB TEST CELL IMMUN MEASURE: CPT

## 2020-10-05 PROCEDURE — 82164 ANGIOTENSIN I ENZYME TEST: CPT

## 2020-10-05 PROCEDURE — 85025 COMPLETE CBC W/AUTO DIFF WBC: CPT

## 2020-10-09 LAB
ACE SERPL-CCNC: 46 U/L (ref 14–82)
BASOPHILS # BLD AUTO: 0 X10E3/UL (ref 0–0.2)
BASOPHILS NFR BLD AUTO: 0 %
EOSINOPHIL # BLD AUTO: 0.4 X10E3/UL (ref 0–0.4)
EOSINOPHIL NFR BLD AUTO: 6 %
ERYTHROCYTE [DISTWIDTH] IN BLOOD BY AUTOMATED COUNT: 13.6 % (ref 11.7–15.4)
GAMMA INTERFERON BACKGROUND BLD IA-ACNC: 0.02 IU/ML
HCT VFR BLD AUTO: 34.2 % (ref 34–46.6)
HGB BLD-MCNC: 11.3 G/DL (ref 11.1–15.9)
IMM GRANULOCYTES # BLD AUTO: 0 X10E3/UL (ref 0–0.1)
IMM GRANULOCYTES NFR BLD AUTO: 0 %
LYMPHOCYTES # BLD AUTO: 1.6 X10E3/UL (ref 0.7–3.1)
LYMPHOCYTES NFR BLD AUTO: 23 %
M TB IFN-G BLD-IMP: NEGATIVE
M TB IFN-G CD4+ BCKGRND COR BLD-ACNC: 0.02 IU/ML
MCH RBC QN AUTO: 31.5 PG (ref 26.6–33)
MCHC RBC AUTO-ENTMCNC: 33 G/DL (ref 31.5–35.7)
MCV RBC AUTO: 95 FL (ref 79–97)
MITOGEN IGNF BLD-ACNC: >10 IU/ML
MONOCYTES # BLD AUTO: 0.5 X10E3/UL (ref 0.1–0.9)
MONOCYTES NFR BLD AUTO: 7 %
NEUTROPHILS # BLD AUTO: 4.3 X10E3/UL (ref 1.4–7)
NEUTROPHILS NFR BLD AUTO: 64 %
PLATELET # BLD AUTO: 200 X10E3/UL (ref 150–450)
QUANTIFERON INCUBATION, QF1T: NORMAL
QUANTIFERON TB2 AG: 0.02 IU/ML
RBC # BLD AUTO: 3.59 X10E6/UL (ref 3.77–5.28)
SARS-COV-2, NAA: NEGATIVE
SERVICE CMNT-IMP: NORMAL
WBC # BLD AUTO: 6.8 X10E3/UL (ref 3.4–10.8)

## 2020-10-14 DIAGNOSIS — K21.9 GASTROESOPHAGEAL REFLUX DISEASE: ICD-10-CM

## 2020-10-16 RX ORDER — PANTOPRAZOLE SODIUM 40 MG/1
TABLET, DELAYED RELEASE ORAL
Qty: 60 TAB | Refills: 0 | Status: SHIPPED | OUTPATIENT
Start: 2020-10-16 | End: 2020-11-18

## 2020-11-10 PROBLEM — R11.2 NAUSEA AND VOMITING: Status: RESOLVED | Noted: 2020-03-17 | Resolved: 2020-11-10

## 2020-11-10 PROBLEM — K04.7 INFECTED DENTAL CARIES: Status: RESOLVED | Noted: 2020-03-17 | Resolved: 2020-11-10

## 2020-11-10 PROBLEM — R63.0 LOSS OF APPETITE: Status: RESOLVED | Noted: 2020-03-17 | Resolved: 2020-11-10

## 2020-11-10 PROBLEM — K02.9 INFECTED DENTAL CARIES: Status: RESOLVED | Noted: 2020-03-17 | Resolved: 2020-11-10

## 2020-11-15 DIAGNOSIS — K21.9 GASTROESOPHAGEAL REFLUX DISEASE: ICD-10-CM

## 2020-11-18 RX ORDER — PANTOPRAZOLE SODIUM 40 MG/1
TABLET, DELAYED RELEASE ORAL
Qty: 60 TAB | Refills: 0 | Status: SHIPPED | OUTPATIENT
Start: 2020-11-18 | End: 2021-01-15

## 2020-11-19 ENCOUNTER — TELEPHONE (OUTPATIENT)
Dept: FAMILY MEDICINE CLINIC | Age: 65
End: 2020-11-19

## 2020-11-19 NOTE — TELEPHONE ENCOUNTER
Patient states she needs an appt to get her INR & dizziness w/headache. She said that Dr Isaac Todd told her to double book him when she calls.

## 2020-11-20 ENCOUNTER — OFFICE VISIT (OUTPATIENT)
Dept: FAMILY MEDICINE CLINIC | Age: 65
End: 2020-11-20
Payer: MEDICARE

## 2020-11-20 VITALS
DIASTOLIC BLOOD PRESSURE: 70 MMHG | TEMPERATURE: 98.5 F | BODY MASS INDEX: 41.35 KG/M2 | HEIGHT: 70 IN | HEART RATE: 71 BPM | OXYGEN SATURATION: 94 % | WEIGHT: 288.8 LBS | SYSTOLIC BLOOD PRESSURE: 162 MMHG | RESPIRATION RATE: 18 BRPM

## 2020-11-20 DIAGNOSIS — Z51.81 ENCOUNTER FOR MONITORING COUMADIN THERAPY: Primary | ICD-10-CM

## 2020-11-20 DIAGNOSIS — R42 DIZZY SPELLS: ICD-10-CM

## 2020-11-20 DIAGNOSIS — Z79.01 ENCOUNTER FOR MONITORING COUMADIN THERAPY: Primary | ICD-10-CM

## 2020-11-20 LAB
INR BLD: 2.4
PT POC: 24.3 SECONDS
VALID INTERNAL CONTROL?: YES

## 2020-11-20 PROCEDURE — G0463 HOSPITAL OUTPT CLINIC VISIT: HCPCS | Performed by: FAMILY MEDICINE

## 2020-11-20 PROCEDURE — G9231 DOC ESRD DIA TRANS PREG: HCPCS | Performed by: FAMILY MEDICINE

## 2020-11-20 PROCEDURE — G8432 DEP SCR NOT DOC, RNG: HCPCS | Performed by: FAMILY MEDICINE

## 2020-11-20 PROCEDURE — 99213 OFFICE O/P EST LOW 20 MIN: CPT | Performed by: FAMILY MEDICINE

## 2020-11-20 PROCEDURE — 85610 PROTHROMBIN TIME: CPT | Performed by: FAMILY MEDICINE

## 2020-11-20 PROCEDURE — 3017F COLORECTAL CA SCREEN DOC REV: CPT | Performed by: FAMILY MEDICINE

## 2020-11-20 PROCEDURE — G9899 SCRN MAM PERF RSLTS DOC: HCPCS | Performed by: FAMILY MEDICINE

## 2020-11-20 PROCEDURE — G8427 DOCREV CUR MEDS BY ELIG CLIN: HCPCS | Performed by: FAMILY MEDICINE

## 2020-11-20 PROCEDURE — G8417 CALC BMI ABV UP PARAM F/U: HCPCS | Performed by: FAMILY MEDICINE

## 2020-11-20 NOTE — PROGRESS NOTES
Progress Note    she is a 60y.o. year old female who presents for evalution. Subjective:     Pt here for INR check for hx of clots/pulmonary embolism. Pt is therapeutic at 2.4 today. Pt is alternating 2.5all other days and 3mg MWF. Pt also reports she is having light headed spells but no syncope. No recent change in medications. Can just be sitting and gets it. Reviewed PmHx, RxHx, FmHx, SocHx, AllgHx and updated and dated in the chart. Review of Systems - negative except as listed above in the HPI    Objective:     Vitals:    11/20/20 1318   BP: (!) 162/70   Pulse: 71   Resp: 18   Temp: 98.5 °F (36.9 °C)   TempSrc: Oral   SpO2: 94%   Weight: 288 lb 12.8 oz (131 kg)   Height: 5' 10\" (1.778 m)       Current Outpatient Medications   Medication Sig    pantoprazole (PROTONIX) 40 mg tablet TAKE 1 TABLET BY MOUTH TWICE DAILY FOR HEART    warfarin (COUMADIN) 3 mg tablet Take 1 Tab by mouth every other day.  atorvastatin (LIPITOR) 80 mg tablet TAKE 1 TABLET BY MOUTH ONCE DAILY NIGHTLY    oxyCODONE-acetaminophen (PERCOCET 7.5) 7.5-325 mg per tablet Take 1 Tab by mouth three (3) times daily for 30 days. Max Daily Amount: 3 Tabs.  sucralfate (CARAFATE) 1 gram tablet Take 1 tablet by mouth 4 times daily    carvediloL (COREG) 25 mg tablet TAKE 1 & 1/2 (ONE & ONE-HALF) TABLETS BY MOUTH TWICE DAILY WITH MEALS    warfarin (COUMADIN) 2.5 mg tablet Take 1 Tab by mouth every other day.  levocetirizine (XYZAL) 5 mg tablet Take 1 Tab by mouth daily.  isosorbide mononitrate ER (IMDUR) 120 mg CR tablet Take 1 tablet by mouth once daily    docusate sodium (STOOL SOFTENER PO) Take  by mouth daily.  nitroglycerin (NITROSTAT) 0.4 mg SL tablet 1 Tab by SubLINGual route every five (5) minutes as needed for Chest Pain. Up to 3 doses.  ergocalciferol (Vitamin D2) 1,250 mcg (50,000 unit) capsule Take 1 Cap by mouth every Tuesday.     allopurinoL (ZYLOPRIM) 100 mg tablet Take 1 tablet by mouth once daily    naloxone [facility]) 4 mg/actuation nasal spray Use 1 spray intranasally, then discard. Repeat with new spray every 2 min as needed for opioid overdose symptoms, alternating nostrils.  amLODIPine (NORVASC) 10 mg tablet Take 1 Tab by mouth daily. Indications: high blood pressure    albuterol (PROAIR HFA) 90 mcg/actuation inhaler Take 2 Puffs by inhalation every four (4) hours as needed for Wheezing.  Oxygen O2 2L NC 24/7     No current facility-administered medications for this visit. Physical Examination: General appearance - alert, well appearing, and in no distress  Mental status - alert, oriented to person, place, and time  Neck - supple, no significant adenopathy, carotids upstroke normal bilaterally, no bruits  Chest - clear to auscultation, no wheezes, rales or rhonchi, symmetric air entry  Heart - normal rate, regular rhythm, normal S1, S2, no murmurs, rubs, clicks or gallops      Assessment/ Plan:   Diagnoses and all orders for this visit:    1. Encounter for monitoring Coumadin therapy  -     AMB POC PT/INR  At goal recheck 1 month  2. Dizzy spells  -     DUPLEX CAROTID BILATERAL; Future       Follow-up and Dispositions    · Return in about 4 weeks (around 12/18/2020), or if symptoms worsen or fail to improve. I have discussed the diagnosis with the patient and the intended plan as seen in the above orders. The patient has received an after-visit summary and questions were answered concerning future plans. Pt conveyed understanding of plan.     Medication Side Effects and Warnings were discussed with patient      Abhijeet Rodney, DO

## 2020-11-20 NOTE — PROGRESS NOTES
Chief Complaint   Patient presents with    Follow-up     INR    Dizziness     Patient presents in office today for INR check. Also has c/o feeling dizzy and having headaches for the last week. No other concerns. 1. Have you been to the ER, urgent care clinic since your last visit? Hospitalized since your last visit? No    2. Have you seen or consulted any other health care providers outside of the [address] [place] [address] since your last visit? Include any pap smears or colon screening.  No    Learning Assessment 6/28/2019   PRIMARY LEARNER Patient   PRIMARY LANGUAGE ENGLISH   LEARNER PREFERENCE PRIMARY LISTENING   ANSWERED BY patient    RELATIONSHIP SELF

## 2020-11-20 NOTE — PATIENT INSTRUCTIONS
A Healthy Lifestyle: Care Instructions  Your Care Instructions     A healthy lifestyle can help you feel good, stay at a healthy weight, and have plenty of energy for both work and play. A healthy lifestyle is something you can share with your whole family. A healthy lifestyle also can lower your risk for serious health problems, such as high blood pressure, heart disease, and diabetes. You can follow a few steps listed below to improve your health and the health of your family. Follow-up care is a key part of your treatment and safety. Be sure to make and go to all appointments, and call your doctor if you are having problems. It's also a good idea to know your test results and keep a list of the medicines you take. How can you care for yourself at home? · Do not eat too much sugar, fat, or fast foods. You can still have dessert and treats now and then. The goal is moderation. · Start small to improve your eating habits. Pay attention to portion sizes, drink less juice and soda pop, and eat more fruits and vegetables. ? Eat a healthy amount of food. A 3-ounce serving of meat, for example, is about the size of a deck of cards. Fill the rest of your plate with vegetables and whole grains. ? Limit the amount of soda and sports drinks you have every day. Drink more water when you are thirsty. ? Eat at least 5 servings of fruits and vegetables every day. It may seem like a lot, but it is not hard to reach this goal. A serving or helping is 1 piece of fruit, 1 cup of vegetables, or 2 cups of leafy, raw vegetables. Have an apple or some carrot sticks as an afternoon snack instead of a candy bar. Try to have fruits and/or vegetables at every meal.  · Make exercise part of your daily routine. You may want to start with simple activities, such as walking, bicycling, or slow swimming. Try to be active 30 to 60 minutes every day. You do not need to do all 30 to 60 minutes all at once.  For example, you can exercise 3 times a day for 10 or 20 minutes. Moderate exercise is safe for most people, but it is always a good idea to talk to your doctor before starting an exercise program.  · Keep moving. Marlene Dove the lawn, work in the garden, or [employer]. Take the stairs instead of the elevator at work. · If you smoke, quit. People who smoke have an increased risk for heart attack, stroke, cancer, and other lung illnesses. Quitting is hard, but there are ways to boost your chance of quitting tobacco for good. ? Use nicotine gum, patches, or lozenges. ? Ask your doctor about stop-smoking programs and medicines. ? Keep trying. In addition to reducing your risk of diseases in the future, you will notice some benefits soon after you stop using tobacco. If you have shortness of breath or asthma symptoms, they will likely get better within a few weeks after you quit. · Limit how much alcohol you drink. Moderate amounts of alcohol (up to 2 drinks a day for men, 1 drink a day for women) are okay. But drinking too much can lead to liver problems, high blood pressure, and other health problems. Family health  If you have a family, there are many things you can do together to improve your health. · Eat meals together as a family as often as possible. · Eat healthy foods. This includes fruits, vegetables, lean meats and dairy, and whole grains. · Include your family in your fitness plan. Most people think of activities such as jogging or tennis as the way to fitness, but there are many ways you and your family can be more active. Anything that makes you breathe hard and gets your heart pumping is exercise. Here are some tips:  ? Walk to do errands or to take your child to school or the bus.  ? Go for a family bike ride after dinner instead of watching TV. Where can you learn more?   Go to http://www.gray.com/  Enter J730 in the search box to learn more about \"A Healthy Lifestyle: Care Instructions. \"  Current as of: January 31, 2020               Content Version: 12.6  © 6604-9768 [employer]. Care instructions adapted under license by [employer] (which disclaims liability or warranty for this information). If you have questions about a medical condition or this instruction, always ask your healthcare professional. [place] [address] any warranty or liability for your use of this information.

## 2020-12-01 ENCOUNTER — HOSPITAL ENCOUNTER (OUTPATIENT)
Dept: VASCULAR SURGERY | Age: 65
Discharge: HOME | End: 2020-12-01
Attending: FAMILY MEDICINE
Payer: MEDICARE

## 2020-12-01 PROCEDURE — 93880 EXTRACRANIAL BILAT STUDY: CPT

## 2020-12-02 LAB
LEFT CCA DIST DIAS: 11.1 CM/S
LEFT CCA DIST SYS: 60.3 CM/S
LEFT CCA PROX DIAS: 9.8 CM/S
LEFT CCA PROX SYS: 79.7 CM/S
LEFT ECA DIAS: 5.17 CM/S
LEFT ECA SYS: 257.1 CM/S
LEFT ICA DIST DIAS: 17.6 CM/S
LEFT ICA DIST SYS: 56.4 CM/S
LEFT ICA MID DIAS: 16.3 CM/S
LEFT ICA MID SYS: 56.4 CM/S
LEFT ICA PROX DIAS: 13.7 CM/S
LEFT ICA PROX SYS: 61.6 CM/S
LEFT ICA/CCA SYS: 1.02
LEFT SUBCLAVIAN DIAS: 98.91 CM/S
LEFT SUBCLAVIAN SYS: 279.9 CM/S
LEFT VERTEBRAL DIAS: 4.88 CM/S
LEFT VERTEBRAL SYS: 44.1 CM/S
RIGHT CCA DIST DIAS: 9.5 CM/S
RIGHT CCA DIST SYS: 75.4 CM/S
RIGHT CCA PROX DIAS: 6.3 CM/S
RIGHT CCA PROX SYS: 93 CM/S
RIGHT ECA DIAS: 9.05 CM/S
RIGHT ECA SYS: 135.1 CM/S
RIGHT ICA DIST DIAS: 12.7 CM/S
RIGHT ICA DIST SYS: 60.7 CM/S
RIGHT ICA MID DIAS: 15.4 CM/S
RIGHT ICA MID SYS: 59 CM/S
RIGHT ICA PROX DIAS: 8.4 CM/S
RIGHT ICA PROX SYS: 39.8 CM/S
RIGHT ICA/CCA SYS: 0.8
RIGHT SUBCLAVIAN DIAS: 0 CM/S
RIGHT SUBCLAVIAN SYS: 127.9 CM/S
RIGHT VERTEBRAL DIAS: 11.86 CM/S
RIGHT VERTEBRAL SYS: 41.5 CM/S

## 2020-12-09 RX ORDER — ISOSORBIDE MONONITRATE 120 MG/1
TABLET, EXTENDED RELEASE ORAL
Qty: 90 TAB | Refills: 0 | Status: SHIPPED | OUTPATIENT
Start: 2020-12-09 | End: 2021-04-13

## 2020-12-18 ENCOUNTER — OFFICE VISIT (OUTPATIENT)
Dept: FAMILY MEDICINE CLINIC | Age: 65
End: 2020-12-18
Payer: MEDICARE

## 2020-12-18 VITALS
WEIGHT: 288.8 LBS | RESPIRATION RATE: 18 BRPM | HEART RATE: 77 BPM | BODY MASS INDEX: 41.35 KG/M2 | DIASTOLIC BLOOD PRESSURE: 69 MMHG | OXYGEN SATURATION: 89 % | TEMPERATURE: 97.9 F | HEIGHT: 70 IN | SYSTOLIC BLOOD PRESSURE: 156 MMHG

## 2020-12-18 DIAGNOSIS — E23.7 PITUITARY ABNORMALITY (HCC): ICD-10-CM

## 2020-12-18 DIAGNOSIS — M25.561 CHRONIC PAIN OF RIGHT KNEE: ICD-10-CM

## 2020-12-18 DIAGNOSIS — L89.303 PRESSURE INJURY OF BUTTOCK, STAGE 3, UNSPECIFIED LATERALITY (HCC): ICD-10-CM

## 2020-12-18 DIAGNOSIS — Z79.01 ENCOUNTER FOR MONITORING COUMADIN THERAPY: ICD-10-CM

## 2020-12-18 DIAGNOSIS — M62.838 NECK MUSCLE SPASM: Primary | ICD-10-CM

## 2020-12-18 DIAGNOSIS — Z51.81 ENCOUNTER FOR MONITORING COUMADIN THERAPY: ICD-10-CM

## 2020-12-18 DIAGNOSIS — G89.29 CHRONIC BILATERAL LOW BACK PAIN, UNSPECIFIED WHETHER SCIATICA PRESENT: ICD-10-CM

## 2020-12-18 DIAGNOSIS — G89.29 CHRONIC PAIN OF RIGHT KNEE: ICD-10-CM

## 2020-12-18 DIAGNOSIS — M54.50 CHRONIC BILATERAL LOW BACK PAIN, UNSPECIFIED WHETHER SCIATICA PRESENT: ICD-10-CM

## 2020-12-18 LAB
INR BLD: 2.8
PT POC: 32.8 SECONDS
VALID INTERNAL CONTROL?: YES

## 2020-12-18 PROCEDURE — G0463 HOSPITAL OUTPT CLINIC VISIT: HCPCS | Performed by: FAMILY MEDICINE

## 2020-12-18 PROCEDURE — G9899 SCRN MAM PERF RSLTS DOC: HCPCS | Performed by: FAMILY MEDICINE

## 2020-12-18 PROCEDURE — G8432 DEP SCR NOT DOC, RNG: HCPCS | Performed by: FAMILY MEDICINE

## 2020-12-18 PROCEDURE — G9231 DOC ESRD DIA TRANS PREG: HCPCS | Performed by: FAMILY MEDICINE

## 2020-12-18 PROCEDURE — G8417 CALC BMI ABV UP PARAM F/U: HCPCS | Performed by: FAMILY MEDICINE

## 2020-12-18 PROCEDURE — 99214 OFFICE O/P EST MOD 30 MIN: CPT | Performed by: FAMILY MEDICINE

## 2020-12-18 PROCEDURE — 3017F COLORECTAL CA SCREEN DOC REV: CPT | Performed by: FAMILY MEDICINE

## 2020-12-18 PROCEDURE — 85610 PROTHROMBIN TIME: CPT | Performed by: FAMILY MEDICINE

## 2020-12-18 PROCEDURE — G8427 DOCREV CUR MEDS BY ELIG CLIN: HCPCS | Performed by: FAMILY MEDICINE

## 2020-12-18 RX ORDER — OXYCODONE AND ACETAMINOPHEN 7.5; 325 MG/1; MG/1
1 TABLET ORAL 3 TIMES DAILY
Qty: 90 TAB | Refills: 0 | Status: SHIPPED | OUTPATIENT
Start: 2021-02-16 | End: 2021-03-18 | Stop reason: SDUPTHER

## 2020-12-18 RX ORDER — CYCLOBENZAPRINE HCL 5 MG
5 TABLET ORAL
Qty: 30 TAB | Refills: 1 | Status: SHIPPED | OUTPATIENT
Start: 2020-12-18 | End: 2021-04-06 | Stop reason: SDUPTHER

## 2020-12-18 RX ORDER — OXYCODONE AND ACETAMINOPHEN 7.5; 325 MG/1; MG/1
1 TABLET ORAL
Qty: 90 TAB | Refills: 0 | Status: SHIPPED | OUTPATIENT
Start: 2021-01-17 | End: 2021-03-18 | Stop reason: SDUPTHER

## 2020-12-18 RX ORDER — OXYCODONE AND ACETAMINOPHEN 7.5; 325 MG/1; MG/1
1 TABLET ORAL
Qty: 90 TAB | Refills: 0 | Status: SHIPPED | OUTPATIENT
Start: 2020-12-18 | End: 2021-03-18 | Stop reason: SDUPTHER

## 2020-12-18 NOTE — PROGRESS NOTES
Progress Note    she is a 63y.o. year old female who presents for evalution. Subjective:     Pt here for INR check and is currently therapeutic at 2.8 and will continue with current dosing and recheck 1 month. She states she started having neck pain after dialysis L side of neck and states is throbbing as well. There was no trauma. Patient here for refill of Percocet 7.5/325. This is taken 3 times a day as needed for pain, pain is from chronic knee, chronic back, chronic chest pain. Patient has been well controlled on this medication. Brings pain from an 8 to a 3-4 out of 10. No history of drug abuse. No history of misuse of medication. She is up-to-date on her drug screen Patient does have multiple chronic health conditions which disable her. Medication does help with quality of life and to be more active around her home.     Opioid/Pain Management:     1. Has the patient signed a pain contract for chronic narcotic use? yes  2. Has the patient had a UDS or Serum screen as per guidelines as per [facility]?:   yes    3. Does patient meet necessary guidelines for Naloxone treatement per the [facility]?:    no  4. Has the Prescription Monitoring Program been reviewed? yes  5. Does patient have a long term condition that requires long term use of a Narcotic?  yes  6. Has patient been tolerant of therapy and responsible to routine follow up and specialist follow-up? Yes  Reviewed PmHx, RxHx, FmHx, SocHx, AllgHx and updated and dated in the chart.     Review of Systems - negative except as listed above in the HPI    Objective:     Vitals:    12/18/20 1453   BP: (!) 156/69   Pulse: 77   Resp: 18   Temp: 97.9 °F (36.6 °C)   TempSrc: Oral   SpO2: (!) 89%   Weight: 288 lb 12.8 oz (131 kg)   Height: 5' 10\" (1.778 m)       Current Outpatient Medications   Medication Sig    cyclobenzaprine (FLEXERIL) 5 mg tablet Take 1 Tab by mouth two (2) times daily as needed for Muscle Spasm(s).  [START ON 2/16/2021] oxyCODONE-acetaminophen (PERCOCET 7.5) 7.5-325 mg per tablet Take 1 Tab by mouth three (3) times daily for 30 days. Max Daily Amount: 3 Tabs.  [START ON 1/17/2021] oxyCODONE-acetaminophen (PERCOCET 7.5) 7.5-325 mg per tablet Take 1 Tab by mouth every eight (8) hours as needed for Pain for up to 30 days. Max Daily Amount: 3 Tabs.  oxyCODONE-acetaminophen (PERCOCET 7.5) 7.5-325 mg per tablet Take 1 Tab by mouth every eight (8) hours as needed for Pain for up to 30 days. Max Daily Amount: 3 Tabs. Please fill on or after 7/19/20.  isosorbide mononitrate ER (IMDUR) 120 mg CR tablet Take 1 tablet by mouth once daily    pantoprazole (PROTONIX) 40 mg tablet TAKE 1 TABLET BY MOUTH TWICE DAILY FOR HEART    warfarin (COUMADIN) 3 mg tablet Take 1 Tab by mouth every other day.  atorvastatin (LIPITOR) 80 mg tablet TAKE 1 TABLET BY MOUTH ONCE DAILY NIGHTLY    sucralfate (CARAFATE) 1 gram tablet Take 1 tablet by mouth 4 times daily    carvediloL (COREG) 25 mg tablet TAKE 1 & 1/2 (ONE & ONE-HALF) TABLETS BY MOUTH TWICE DAILY WITH MEALS    warfarin (COUMADIN) 2.5 mg tablet Take 1 Tab by mouth every other day.  levocetirizine (XYZAL) 5 mg tablet Take 1 Tab by mouth daily.  docusate sodium (STOOL SOFTENER PO) Take  by mouth daily.  nitroglycerin (NITROSTAT) 0.4 mg SL tablet 1 Tab by SubLINGual route every five (5) minutes as needed for Chest Pain. Up to 3 doses.  ergocalciferol (Vitamin D2) 1,250 mcg (50,000 unit) capsule Take 1 Cap by mouth every Tuesday.  allopurinoL (ZYLOPRIM) 100 mg tablet Take 1 tablet by mouth once daily    naloxone (NARCAN) 4 mg/actuation nasal spray Use 1 spray intranasally, then discard. Repeat with new spray every 2 min as needed for opioid overdose symptoms, alternating nostrils.  amLODIPine (NORVASC) 10 mg tablet Take 1 Tab by mouth daily.  Indications: high blood pressure    albuterol (PROAIR HFA) 90 mcg/actuation inhaler Take 2 Puffs by inhalation every four (4) hours as needed for Wheezing.  Oxygen O2 2L NC 24/7     No current facility-administered medications for this visit. Physical Examination: General appearance - alert, well appearing, and in no distress  Chest - clear to auscultation, no wheezes, rales or rhonchi, symmetric air entry  Heart - normal rate, regular rhythm, normal S1, S2, no murmurs, rubs, clicks or gallops  Musculoskeletal - spasm of L trapezius and L scap muscle with sig ttp      Assessment/ Plan:   Diagnoses and all orders for this visit:    1. Neck muscle spasm  -     cyclobenzaprine (FLEXERIL) 5 mg tablet; Take 1 Tab by mouth two (2) times daily as needed for Muscle Spasm(s). Heating pad and gentle stretching  2. Encounter for monitoring Coumadin therapy  -     AMB POC PT/INR    3. 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 Chronic bilateral low back pain, unspecified whether sciatica present  -     oxyCODONE-acetaminophen (PERCOCET 7.5) 7.5-325 mg per tablet; Take 1 Tab by mouth three (3) times daily for 30 days. Max Daily Amount: 3 Tabs. -     oxyCODONE-acetaminophen (PERCOCET 7.5) 7.5-325 mg per tablet; Take 1 Tab by mouth every eight (8) hours as needed for Pain for up to 30 days. Max Daily Amount: 3 Tabs. -     oxyCODONE-acetaminophen (PERCOCET 7.5) 7.5-325 mg per tablet; Take 1 Tab by mouth every eight (8) hours as needed for Pain for up to 30 days. Max Daily Amount: 3 Tabs. Please fill on or after 7/19/20.    6. Chronic pain of right knee  -     oxyCODONE-acetaminophen (PERCOCET 7.5) 7.5-325 mg per tablet; Take 1 Tab by mouth three (3) times daily for 30 days. Max Daily Amount: 3 Tabs. -     oxyCODONE-acetaminophen (PERCOCET 7.5) 7.5-325 mg per tablet; Take 1 Tab by mouth every eight (8) hours as needed for Pain for up to 30 days. Max Daily Amount: 3 Tabs.   -     oxyCODONE-acetaminophen (PERCOCET 7. 5) 7.5-325 mg per tablet; Take 1 Tab by mouth every eight (8) hours as needed for Pain for up to 30 days. Max Daily Amount: 3 Tabs. Please fill on or after 7/19/20. No change in therapy indicated     Follow-up and Dispositions    · Return in 4 weeks (on 1/15/2021), or if symptoms worsen or fail to improve, for INR check. I have discussed the diagnosis with the patient and the intended plan as seen in the above orders. The patient has received an after-visit summary and questions were answered concerning future plans. Pt conveyed understanding of plan.     Medication Side Effects and Warnings were discussed with patient      Cipriano Lunsford,

## 2020-12-18 NOTE — PATIENT INSTRUCTIONS
A Healthy Lifestyle: Care Instructions  Your Care Instructions     A healthy lifestyle can help you feel good, stay at a healthy weight, and have plenty of energy for both work and play. A healthy lifestyle is something you can share with your whole family. A healthy lifestyle also can lower your risk for serious health problems, such as high blood pressure, heart disease, and diabetes. You can follow a few steps listed below to improve your health and the health of your family. Follow-up care is a key part of your treatment and safety. Be sure to make and go to all appointments, and call your doctor if you are having problems. It's also a good idea to know your test results and keep a list of the medicines you take. How can you care for yourself at home? · Do not eat too much sugar, fat, or fast foods. You can still have dessert and treats now and then. The goal is moderation. · Start small to improve your eating habits. Pay attention to portion sizes, drink less juice and soda pop, and eat more fruits and vegetables. ? Eat a healthy amount of food. A 3-ounce serving of meat, for example, is about the size of a deck of cards. Fill the rest of your plate with vegetables and whole grains. ? Limit the amount of soda and sports drinks you have every day. Drink more water when you are thirsty. ? Eat at least 5 servings of fruits and vegetables every day. It may seem like a lot, but it is not hard to reach this goal. A serving or helping is 1 piece of fruit, 1 cup of vegetables, or 2 cups of leafy, raw vegetables. Have an apple or some carrot sticks as an afternoon snack instead of a candy bar. Try to have fruits and/or vegetables at every meal.  · Make exercise part of your daily routine. You may want to start with simple activities, such as walking, bicycling, or slow swimming. Try to be active 30 to 60 minutes every day. You do not need to do all 30 to 60 minutes all at once.  For example, you can exercise 3 times a day for 10 or 20 minutes. Moderate exercise is safe for most people, but it is always a good idea to talk to your doctor before starting an exercise program.  · Keep moving. Yanna Pruittos the lawn, work in the garden, or [employer]. Take the stairs instead of the elevator at work. · If you smoke, quit. People who smoke have an increased risk for heart attack, stroke, cancer, and other lung illnesses. Quitting is hard, but there are ways to boost your chance of quitting tobacco for good. ? Use nicotine gum, patches, or lozenges. ? Ask your doctor about stop-smoking programs and medicines. ? Keep trying. In addition to reducing your risk of diseases in the future, you will notice some benefits soon after you stop using tobacco. If you have shortness of breath or asthma symptoms, they will likely get better within a few weeks after you quit. · Limit how much alcohol you drink. Moderate amounts of alcohol (up to 2 drinks a day for men, 1 drink a day for women) are okay. But drinking too much can lead to liver problems, high blood pressure, and other health problems. Family health  If you have a family, there are many things you can do together to improve your health. · Eat meals together as a family as often as possible. · Eat healthy foods. This includes fruits, vegetables, lean meats and dairy, and whole grains. · Include your family in your fitness plan. Most people think of activities such as jogging or tennis as the way to fitness, but there are many ways you and your family can be more active. Anything that makes you breathe hard and gets your heart pumping is exercise. Here are some tips:  ? Walk to do errands or to take your child to school or the bus.  ? Go for a family bike ride after dinner instead of watching TV. Where can you learn more? Go to http://www.gray.com/  Enter F781 in the search box to learn more about \"A Healthy Lifestyle: Care Instructions. \"  Current as of: January 31, 2020               Content Version: 12.6  © 7255-9777 [employer]. Care instructions adapted under license by [employer] (which disclaims liability or warranty for this information). If you have questions about a medical condition or this instruction, always ask your healthcare professional. [address] any warranty or liability for your use of this information.

## 2020-12-18 NOTE — PROGRESS NOTES
Chief Complaint   Patient presents with    Follow-up     INR        Patient presents in office today for INR check  Has c/o a sharp dull pain in her neck and shoulder. No other concerns. 1. Have you been to the ER, urgent care clinic since your last visit? Hospitalized since your last visit? No    2. Have you seen or consulted any other health care providers outside of the [address] [place] [address] since your last visit? Include any pap smears or colon screening.  No    Learning Assessment 6/28/2019   PRIMARY LEARNER Patient   PRIMARY LANGUAGE ENGLISH   LEARNER PREFERENCE PRIMARY LISTENING   ANSWERED BY patient    RELATIONSHIP SELF

## 2021-01-13 ENCOUNTER — DOCUMENTATION ONLY (OUTPATIENT)
Dept: FAMILY MEDICINE CLINIC | Age: 66
End: 2021-01-13

## 2021-01-14 DIAGNOSIS — K21.9 GASTROESOPHAGEAL REFLUX DISEASE: ICD-10-CM

## 2021-01-15 ENCOUNTER — OFFICE VISIT (OUTPATIENT)
Dept: FAMILY MEDICINE CLINIC | Age: 66
End: 2021-01-15
Payer: MEDICARE

## 2021-01-15 VITALS
DIASTOLIC BLOOD PRESSURE: 67 MMHG | WEIGHT: 288 LBS | SYSTOLIC BLOOD PRESSURE: 144 MMHG | BODY MASS INDEX: 41.23 KG/M2 | HEIGHT: 70 IN | OXYGEN SATURATION: 92 % | RESPIRATION RATE: 16 BRPM | TEMPERATURE: 98.6 F | HEART RATE: 73 BPM

## 2021-01-15 DIAGNOSIS — Z79.01 ANTICOAGULATED ON COUMADIN: Primary | ICD-10-CM

## 2021-01-15 LAB
INR BLD: 1.9
PT POC: NORMAL
VALID INTERNAL CONTROL?: YES

## 2021-01-15 PROCEDURE — G8417 CALC BMI ABV UP PARAM F/U: HCPCS | Performed by: FAMILY MEDICINE

## 2021-01-15 PROCEDURE — G8427 DOCREV CUR MEDS BY ELIG CLIN: HCPCS | Performed by: FAMILY MEDICINE

## 2021-01-15 PROCEDURE — 99213 OFFICE O/P EST LOW 20 MIN: CPT | Performed by: FAMILY MEDICINE

## 2021-01-15 PROCEDURE — G8510 SCR DEP NEG, NO PLAN REQD: HCPCS | Performed by: FAMILY MEDICINE

## 2021-01-15 PROCEDURE — 3017F COLORECTAL CA SCREEN DOC REV: CPT | Performed by: FAMILY MEDICINE

## 2021-01-15 PROCEDURE — G9899 SCRN MAM PERF RSLTS DOC: HCPCS | Performed by: FAMILY MEDICINE

## 2021-01-15 PROCEDURE — 85610 PROTHROMBIN TIME: CPT | Performed by: FAMILY MEDICINE

## 2021-01-15 PROCEDURE — G0463 HOSPITAL OUTPT CLINIC VISIT: HCPCS | Performed by: FAMILY MEDICINE

## 2021-01-15 PROCEDURE — G9231 DOC ESRD DIA TRANS PREG: HCPCS | Performed by: FAMILY MEDICINE

## 2021-01-15 RX ORDER — PANTOPRAZOLE SODIUM 40 MG/1
TABLET, DELAYED RELEASE ORAL
Qty: 60 TAB | Refills: 0 | Status: SHIPPED | OUTPATIENT
Start: 2021-01-15 | End: 2021-02-17

## 2021-01-15 NOTE — PATIENT INSTRUCTIONS
A Healthy Lifestyle: Care Instructions  Your Care Instructions     A healthy lifestyle can help you feel good, stay at a healthy weight, and have plenty of energy for both work and play. A healthy lifestyle is something you can share with your whole family. A healthy lifestyle also can lower your risk for serious health problems, such as high blood pressure, heart disease, and diabetes. You can follow a few steps listed below to improve your health and the health of your family. Follow-up care is a key part of your treatment and safety. Be sure to make and go to all appointments, and call your doctor if you are having problems. It's also a good idea to know your test results and keep a list of the medicines you take. How can you care for yourself at home? · Do not eat too much sugar, fat, or fast foods. You can still have dessert and treats now and then. The goal is moderation. · Start small to improve your eating habits. Pay attention to portion sizes, drink less juice and soda pop, and eat more fruits and vegetables. ? Eat a healthy amount of food. A 3-ounce serving of meat, for example, is about the size of a deck of cards. Fill the rest of your plate with vegetables and whole grains. ? Limit the amount of soda and sports drinks you have every day. Drink more water when you are thirsty. ? Eat at least 5 servings of fruits and vegetables every day. It may seem like a lot, but it is not hard to reach this goal. A serving or helping is 1 piece of fruit, 1 cup of vegetables, or 2 cups of leafy, raw vegetables. Have an apple or some carrot sticks as an afternoon snack instead of a candy bar. Try to have fruits and/or vegetables at every meal.  · Make exercise part of your daily routine. You may want to start with simple activities, such as walking, bicycling, or slow swimming. Try to be active 30 to 60 minutes every day. You do not need to do all 30 to 60 minutes all at once.  For example, you can exercise 3 times a day for 10 or 20 minutes. Moderate exercise is safe for most people, but it is always a good idea to talk to your doctor before starting an exercise program.  · Keep moving. Luda Choi the lawn, work in the garden, or [employer]. Take the stairs instead of the elevator at work. · If you smoke, quit. People who smoke have an increased risk for heart attack, stroke, cancer, and other lung illnesses. Quitting is hard, but there are ways to boost your chance of quitting tobacco for good. ? Use nicotine gum, patches, or lozenges. ? Ask your doctor about stop-smoking programs and medicines. ? Keep trying. In addition to reducing your risk of diseases in the future, you will notice some benefits soon after you stop using tobacco. If you have shortness of breath or asthma symptoms, they will likely get better within a few weeks after you quit. · Limit how much alcohol you drink. Moderate amounts of alcohol (up to 2 drinks a day for men, 1 drink a day for women) are okay. But drinking too much can lead to liver problems, high blood pressure, and other health problems. Family health  If you have a family, there are many things you can do together to improve your health. · Eat meals together as a family as often as possible. · Eat healthy foods. This includes fruits, vegetables, lean meats and dairy, and whole grains. · Include your family in your fitness plan. Most people think of activities such as jogging or tennis as the way to fitness, but there are many ways you and your family can be more active. Anything that makes you breathe hard and gets your heart pumping is exercise. Here are some tips:  ? Walk to do errands or to take your child to school or the bus.  ? Go for a family bike ride after dinner instead of watching TV. Where can you learn more?   Go to http://www.gray.com/  Enter A709 in the search box to learn more about \"A Healthy Lifestyle: Care Instructions. \"  Current as of: January 31, 2020               Content Version: 12.6  © 3672-1498 [employer]. Care instructions adapted under license by [employer] (which disclaims liability or warranty for this information). If you have questions about a medical condition or this instruction, always ask your healthcare professional. [place] [address] any warranty or liability for your use of this information.

## 2021-01-15 NOTE — PROGRESS NOTES
Progress Note    she is a 63y.o. year old female who presents for evalution. Subjective:     Pt here for INR check and currently at 1.9. She had some greens and relates it to be lowing her INR. Pt is otherwise stable with her chronic pain. Currently 3mg MWF and 2.5 all other days. Reviewed PmHx, RxHx, FmHx, SocHx, AllgHx and updated and dated in the chart. Review of Systems - negative except as listed above in the HPI    Objective:     Vitals:    01/15/21 1325   BP: (!) 144/67   Pulse: 73   Resp: 16   Temp: 98.6 °F (37 °C)   TempSrc: Oral   SpO2: 92%   Weight: 288 lb (130.6 kg)   Height: 5' 10\" (1.778 m)       Current Outpatient Medications   Medication Sig    cyclobenzaprine (FLEXERIL) 5 mg tablet Take 1 Tab by mouth two (2) times daily as needed for Muscle Spasm(s).  [START ON 2/16/2021] oxyCODONE-acetaminophen (PERCOCET 7.5) 7.5-325 mg per tablet Take 1 Tab by mouth three (3) times daily for 30 days. Max Daily Amount: 3 Tabs.  [START ON 1/17/2021] oxyCODONE-acetaminophen (PERCOCET 7.5) 7.5-325 mg per tablet Take 1 Tab by mouth every eight (8) hours as needed for Pain for up to 30 days. Max Daily Amount: 3 Tabs.  oxyCODONE-acetaminophen (PERCOCET 7.5) 7.5-325 mg per tablet Take 1 Tab by mouth every eight (8) hours as needed for Pain for up to 30 days. Max Daily Amount: 3 Tabs. Please fill on or after 7/19/20.  isosorbide mononitrate ER (IMDUR) 120 mg CR tablet Take 1 tablet by mouth once daily    pantoprazole (PROTONIX) 40 mg tablet TAKE 1 TABLET BY MOUTH TWICE DAILY FOR HEART    warfarin (COUMADIN) 3 mg tablet Take 1 Tab by mouth every other day.     atorvastatin (LIPITOR) 80 mg tablet TAKE 1 TABLET BY MOUTH ONCE DAILY NIGHTLY    sucralfate (CARAFATE) 1 gram tablet Take 1 tablet by mouth 4 times daily    carvediloL (COREG) 25 mg tablet TAKE 1 & 1/2 (ONE & ONE-HALF) TABLETS BY MOUTH TWICE DAILY WITH MEALS    warfarin (COUMADIN) 2.5 mg tablet Take 1 Tab by mouth every other day.    levocetirizine (XYZAL) 5 mg tablet Take 1 Tab by mouth daily.  docusate sodium (STOOL SOFTENER PO) Take  by mouth daily.  nitroglycerin (NITROSTAT) 0.4 mg SL tablet 1 Tab by SubLINGual route every five (5) minutes as needed for Chest Pain. Up to 3 doses.  ergocalciferol (Vitamin D2) 1,250 mcg (50,000 unit) capsule Take 1 Cap by mouth every Tuesday.  allopurinoL (ZYLOPRIM) 100 mg tablet Take 1 tablet by mouth once daily    naloxone (NARCAN) 4 mg/actuation nasal spray Use 1 spray intranasally, then discard. Repeat with new spray every 2 min as needed for opioid overdose symptoms, alternating nostrils.  amLODIPine (NORVASC) 10 mg tablet Take 1 Tab by mouth daily. Indications: high blood pressure    albuterol (PROAIR HFA) 90 mcg/actuation inhaler Take 2 Puffs by inhalation every four (4) hours as needed for Wheezing.  Oxygen O2 2L NC 24/7     No current facility-administered medications for this visit. Physical Examination: General appearance - alert, well appearing, and in no distress  Mental status - alert, oriented to person, place, and time      Assessment/ Plan:   Diagnoses and all orders for this visit:    1. Anticoagulated on Coumadin  -     AMB POC PT/INR  Take extra pill today then resume normal and recheck 1 month. Follow-up and Dispositions    · Return in about 4 weeks (around 2/12/2021), or if symptoms worsen or fail to improve. I have discussed the diagnosis with the patient and the intended plan as seen in the above orders. The patient has received an after-visit summary and questions were answered concerning future plans. Pt conveyed understanding of plan.     Medication Side Effects and Warnings were discussed with patient    An electronic signature was used to authenticate this note  Megan Mares,

## 2021-01-15 NOTE — PROGRESS NOTES
Chief Complaint   Patient presents with    Labs    Back Pain     Pt in office today for for labs  -inr  -pt has concerns for pain in her back and neck  -pt states this has been going on for sometime  -pt treats with oxycodone    1. Have you been to the ER, urgent care clinic since your last visit? Hospitalized since your last visit? No    2. Have you seen or consulted any other health care providers outside of the [address] [place] [address] since your last visit? Include any pap smears or colon screening.  No     Pt has no other concerns

## 2021-02-16 DIAGNOSIS — I10 ESSENTIAL HYPERTENSION: Chronic | ICD-10-CM

## 2021-02-16 DIAGNOSIS — K21.9 GASTROESOPHAGEAL REFLUX DISEASE: ICD-10-CM

## 2021-02-17 ENCOUNTER — OFFICE VISIT (OUTPATIENT)
Dept: FAMILY MEDICINE CLINIC | Age: 66
End: 2021-02-17
Payer: MEDICARE

## 2021-02-17 VITALS
HEIGHT: 70 IN | BODY MASS INDEX: 41.23 KG/M2 | OXYGEN SATURATION: 96 % | HEART RATE: 74 BPM | TEMPERATURE: 98.2 F | WEIGHT: 288 LBS | DIASTOLIC BLOOD PRESSURE: 73 MMHG | SYSTOLIC BLOOD PRESSURE: 159 MMHG | RESPIRATION RATE: 18 BRPM

## 2021-02-17 DIAGNOSIS — Z79.01 WARFARIN ANTICOAGULATION: Primary | ICD-10-CM

## 2021-02-17 PROBLEM — J44.9 COPD, SEVERE (HCC): Status: RESOLVED | Noted: 2017-06-21 | Resolved: 2021-02-17

## 2021-02-17 LAB
INR BLD: 1.7
PT POC: 19.9 SECONDS
VALID INTERNAL CONTROL?: YES

## 2021-02-17 PROCEDURE — G8510 SCR DEP NEG, NO PLAN REQD: HCPCS | Performed by: FAMILY MEDICINE

## 2021-02-17 PROCEDURE — 3017F COLORECTAL CA SCREEN DOC REV: CPT | Performed by: FAMILY MEDICINE

## 2021-02-17 PROCEDURE — G8417 CALC BMI ABV UP PARAM F/U: HCPCS | Performed by: FAMILY MEDICINE

## 2021-02-17 PROCEDURE — G8427 DOCREV CUR MEDS BY ELIG CLIN: HCPCS | Performed by: FAMILY MEDICINE

## 2021-02-17 PROCEDURE — 99213 OFFICE O/P EST LOW 20 MIN: CPT | Performed by: FAMILY MEDICINE

## 2021-02-17 PROCEDURE — G9231 DOC ESRD DIA TRANS PREG: HCPCS | Performed by: FAMILY MEDICINE

## 2021-02-17 PROCEDURE — G9899 SCRN MAM PERF RSLTS DOC: HCPCS | Performed by: FAMILY MEDICINE

## 2021-02-17 PROCEDURE — G0463 HOSPITAL OUTPT CLINIC VISIT: HCPCS | Performed by: FAMILY MEDICINE

## 2021-02-17 PROCEDURE — 85610 PROTHROMBIN TIME: CPT | Performed by: FAMILY MEDICINE

## 2021-02-17 RX ORDER — PANTOPRAZOLE SODIUM 40 MG/1
TABLET, DELAYED RELEASE ORAL
Qty: 60 TAB | Refills: 0 | Status: SHIPPED | OUTPATIENT
Start: 2021-02-17 | End: 2021-03-23

## 2021-02-17 RX ORDER — AMLODIPINE BESYLATE 10 MG/1
TABLET ORAL
Qty: 90 TAB | Refills: 0 | Status: SHIPPED | OUTPATIENT
Start: 2021-02-17 | End: 2021-05-14

## 2021-02-17 NOTE — PROGRESS NOTES
Progress Note    she is a 63y.o. year old female who presents for evalution. Subjective:     Patient here for INR check currently at 1.9. Patient should be 2-3. Currently 3mg MWF and 2.5 all other days  Pt does not have copd and uses oxygen only for ILD @ night. Has a PFT from 2017 and showed no COPD. Had some greens this past week which could account for slightly low INR. Reviewed PmHx, RxHx, FmHx, SocHx, AllgHx and updated and dated in the chart. Review of Systems - negative except as listed above in the HPI    Objective:     Vitals:    02/17/21 1050   BP: (!) 159/73   Pulse: 74   Resp: 18   Temp: 98.2 °F (36.8 °C)   TempSrc: Oral   SpO2: 96%   Weight: 288 lb (130.6 kg)   Height: 5' 10\" (1.778 m)       Current Outpatient Medications   Medication Sig    pantoprazole (PROTONIX) 40 mg tablet TAKE 1 TABLET BY MOUTH TWICE DAILY FOR HEART    cyclobenzaprine (FLEXERIL) 5 mg tablet Take 1 Tab by mouth two (2) times daily as needed for Muscle Spasm(s).  oxyCODONE-acetaminophen (PERCOCET 7.5) 7.5-325 mg per tablet Take 1 Tab by mouth three (3) times daily for 30 days. Max Daily Amount: 3 Tabs.  isosorbide mononitrate ER (IMDUR) 120 mg CR tablet Take 1 tablet by mouth once daily    warfarin (COUMADIN) 3 mg tablet Take 1 Tab by mouth every other day.  atorvastatin (LIPITOR) 80 mg tablet TAKE 1 TABLET BY MOUTH ONCE DAILY NIGHTLY    sucralfate (CARAFATE) 1 gram tablet Take 1 tablet by mouth 4 times daily    carvediloL (COREG) 25 mg tablet TAKE 1 & 1/2 (ONE & ONE-HALF) TABLETS BY MOUTH TWICE DAILY WITH MEALS    warfarin (COUMADIN) 2.5 mg tablet Take 1 Tab by mouth every other day.  levocetirizine (XYZAL) 5 mg tablet Take 1 Tab by mouth daily.  docusate sodium (STOOL SOFTENER PO) Take  by mouth daily.  nitroglycerin (NITROSTAT) 0.4 mg SL tablet 1 Tab by SubLINGual route every five (5) minutes as needed for Chest Pain. Up to 3 doses.     ergocalciferol (Vitamin D2) 1,250 mcg (50,000 unit) capsule Take 1 Cap by mouth every Tuesday.  allopurinoL (ZYLOPRIM) 100 mg tablet Take 1 tablet by mouth once daily    naloxone (NARCAN) 4 mg/actuation nasal spray Use 1 spray intranasally, then discard. Repeat with new spray every 2 min as needed for opioid overdose symptoms, alternating nostrils.  amLODIPine (NORVASC) 10 mg tablet Take 1 Tab by mouth daily. Indications: high blood pressure    albuterol (PROAIR HFA) 90 mcg/actuation inhaler Take 2 Puffs by inhalation every four (4) hours as needed for Wheezing.  Oxygen O2 2L NC 24/7     No current facility-administered medications for this visit. Physical Examination: General appearance - alert, well appearing, and in no distress  Mental status - alert, oriented to person, place, and time      Assessment/ Plan:   Diagnoses and all orders for this visit:    1. Warfarin anticoagulation  -     AMB POC PT/INR     c/w current dosing recheck 4 weeks. COPD deleted since she does not have this Dx. PFT no COPD 2017. Follow-up and Dispositions    · Return in about 4 weeks (around 3/17/2021), or if symptoms worsen or fail to improve. I have discussed the diagnosis with the patient and the intended plan as seen in the above orders. The patient has received an after-visit summary and questions were answered concerning future plans. Pt conveyed understanding of plan.     Medication Side Effects and Warnings were discussed with patient    An electronic signature was used to authenticate this note  [id number] [facility], DO

## 2021-02-17 NOTE — PATIENT INSTRUCTIONS
A Healthy Lifestyle: Care Instructions  Your Care Instructions     A healthy lifestyle can help you feel good, stay at a healthy weight, and have plenty of energy for both work and play. A healthy lifestyle is something you can share with your whole family. A healthy lifestyle also can lower your risk for serious health problems, such as high blood pressure, heart disease, and diabetes. You can follow a few steps listed below to improve your health and the health of your family. Follow-up care is a key part of your treatment and safety. Be sure to make and go to all appointments, and call your doctor if you are having problems. It's also a good idea to know your test results and keep a list of the medicines you take. How can you care for yourself at home? · Do not eat too much sugar, fat, or fast foods. You can still have dessert and treats now and then. The goal is moderation. · Start small to improve your eating habits. Pay attention to portion sizes, drink less juice and soda pop, and eat more fruits and vegetables. ? Eat a healthy amount of food. A 3-ounce serving of meat, for example, is about the size of a deck of cards. Fill the rest of your plate with vegetables and whole grains. ? Limit the amount of soda and sports drinks you have every day. Drink more water when you are thirsty. ? Eat at least 5 servings of fruits and vegetables every day. It may seem like a lot, but it is not hard to reach this goal. A serving or helping is 1 piece of fruit, 1 cup of vegetables, or 2 cups of leafy, raw vegetables. Have an apple or some carrot sticks as an afternoon snack instead of a candy bar. Try to have fruits and/or vegetables at every meal.  · Make exercise part of your daily routine. You may want to start with simple activities, such as walking, bicycling, or slow swimming. Try to be active 30 to 60 minutes every day. You do not need to do all 30 to 60 minutes all at once.  For example, you can exercise 3 times a day for 10 or 20 minutes. Moderate exercise is safe for most people, but it is always a good idea to talk to your doctor before starting an exercise program.  · Keep moving. Adam Prasado the lawn, work in the garden, or [employer]. Take the stairs instead of the elevator at work. · If you smoke, quit. People who smoke have an increased risk for heart attack, stroke, cancer, and other lung illnesses. Quitting is hard, but there are ways to boost your chance of quitting tobacco for good. ? Use nicotine gum, patches, or lozenges. ? Ask your doctor about stop-smoking programs and medicines. ? Keep trying. In addition to reducing your risk of diseases in the future, you will notice some benefits soon after you stop using tobacco. If you have shortness of breath or asthma symptoms, they will likely get better within a few weeks after you quit. · Limit how much alcohol you drink. Moderate amounts of alcohol (up to 2 drinks a day for men, 1 drink a day for women) are okay. But drinking too much can lead to liver problems, high blood pressure, and other health problems. Family health  If you have a family, there are many things you can do together to improve your health. · Eat meals together as a family as often as possible. · Eat healthy foods. This includes fruits, vegetables, lean meats and dairy, and whole grains. · Include your family in your fitness plan. Most people think of activities such as jogging or tennis as the way to fitness, but there are many ways you and your family can be more active. Anything that makes you breathe hard and gets your heart pumping is exercise. Here are some tips:  ? Walk to do errands or to take your child to school or the bus.  ? Go for a family bike ride after dinner instead of watching TV. Where can you learn more?   Go to http://www.gray.com/  Enter A541 in the search box to learn more about \"A Healthy Lifestyle: Care Instructions. \"  Current as of: January 31, 2020               Content Version: 12.6  © 5361-4062 [employer]. Care instructions adapted under license by [employer] (which disclaims liability or warranty for this information). If you have questions about a medical condition or this instruction, always ask your healthcare professional. [place] [address] any warranty or liability for your use of this information.

## 2021-02-17 NOTE — PROGRESS NOTES
Chief Complaint   Patient presents with    Anticoagulation    Hypertension     Requesting removal of COPD DX    Labs     INR     1. Have you been to the ER, urgent care clinic since your last visit? Hospitalized since your last visit? No    2. Have you seen or consulted any other health care providers outside of the [address] [place] [address] since your last visit? Include any pap smears or colon screening.  No     Results for orders placed or performed in visit on 02/17/21   AMB POC PT/INR   Result Value Ref Range    VALID INTERNAL CONTROL POC Yes     Prothrombin time (POC) 19.9 seconds    INR POC 1.7

## 2021-03-08 ENCOUNTER — DOCUMENTATION ONLY (OUTPATIENT)
Dept: FAMILY MEDICINE CLINIC | Age: 66
End: 2021-03-08

## 2021-03-08 NOTE — PROGRESS NOTES
[facility] Coordinator notes from Becca Ramirez RN was put on Dr Khushboo Medeiros desk to process.

## 2021-03-18 ENCOUNTER — OFFICE VISIT (OUTPATIENT)
Dept: FAMILY MEDICINE CLINIC | Age: 66
End: 2021-03-18
Payer: MEDICARE

## 2021-03-18 VITALS
HEIGHT: 70 IN | BODY MASS INDEX: 41.47 KG/M2 | TEMPERATURE: 98.1 F | SYSTOLIC BLOOD PRESSURE: 116 MMHG | OXYGEN SATURATION: 98 % | RESPIRATION RATE: 18 BRPM | WEIGHT: 289.68 LBS | DIASTOLIC BLOOD PRESSURE: 62 MMHG | HEART RATE: 71 BPM

## 2021-03-18 DIAGNOSIS — M25.561 CHRONIC PAIN OF RIGHT KNEE: ICD-10-CM

## 2021-03-18 DIAGNOSIS — M25.512 LEFT SHOULDER PAIN, UNSPECIFIED CHRONICITY: ICD-10-CM

## 2021-03-18 DIAGNOSIS — Z51.81 ENCOUNTER FOR MONITORING COUMADIN THERAPY: Primary | ICD-10-CM

## 2021-03-18 DIAGNOSIS — M54.50 CHRONIC BILATERAL LOW BACK PAIN, UNSPECIFIED WHETHER SCIATICA PRESENT: ICD-10-CM

## 2021-03-18 DIAGNOSIS — Z79.01 ENCOUNTER FOR MONITORING COUMADIN THERAPY: Primary | ICD-10-CM

## 2021-03-18 DIAGNOSIS — G89.29 CHRONIC PAIN OF RIGHT KNEE: ICD-10-CM

## 2021-03-18 DIAGNOSIS — G89.29 CHRONIC BILATERAL LOW BACK PAIN, UNSPECIFIED WHETHER SCIATICA PRESENT: ICD-10-CM

## 2021-03-18 LAB
INR BLD: 1.9
PT POC: 22.5 SECONDS
VALID INTERNAL CONTROL?: YES

## 2021-03-18 PROCEDURE — G9231 DOC ESRD DIA TRANS PREG: HCPCS | Performed by: FAMILY MEDICINE

## 2021-03-18 PROCEDURE — G8417 CALC BMI ABV UP PARAM F/U: HCPCS | Performed by: FAMILY MEDICINE

## 2021-03-18 PROCEDURE — G8432 DEP SCR NOT DOC, RNG: HCPCS | Performed by: FAMILY MEDICINE

## 2021-03-18 PROCEDURE — 3017F COLORECTAL CA SCREEN DOC REV: CPT | Performed by: FAMILY MEDICINE

## 2021-03-18 PROCEDURE — G8427 DOCREV CUR MEDS BY ELIG CLIN: HCPCS | Performed by: FAMILY MEDICINE

## 2021-03-18 PROCEDURE — 99214 OFFICE O/P EST MOD 30 MIN: CPT | Performed by: FAMILY MEDICINE

## 2021-03-18 PROCEDURE — 85610 PROTHROMBIN TIME: CPT | Performed by: FAMILY MEDICINE

## 2021-03-18 PROCEDURE — G0463 HOSPITAL OUTPT CLINIC VISIT: HCPCS | Performed by: FAMILY MEDICINE

## 2021-03-18 PROCEDURE — G9899 SCRN MAM PERF RSLTS DOC: HCPCS | Performed by: FAMILY MEDICINE

## 2021-03-18 RX ORDER — OXYCODONE AND ACETAMINOPHEN 7.5; 325 MG/1; MG/1
1 TABLET ORAL
Qty: 90 TAB | Refills: 0 | Status: SHIPPED | OUTPATIENT
Start: 2021-04-03 | End: 2021-05-03

## 2021-03-18 RX ORDER — OXYCODONE AND ACETAMINOPHEN 7.5; 325 MG/1; MG/1
1 TABLET ORAL
Qty: 90 TAB | Refills: 0 | Status: SHIPPED | OUTPATIENT
Start: 2021-05-03 | End: 2021-06-02

## 2021-03-18 RX ORDER — OXYCODONE AND ACETAMINOPHEN 7.5; 325 MG/1; MG/1
1 TABLET ORAL 3 TIMES DAILY
Qty: 90 TAB | Refills: 0 | Status: SHIPPED | OUTPATIENT
Start: 2021-06-02 | End: 2021-07-02

## 2021-03-18 NOTE — PATIENT INSTRUCTIONS
A Healthy Lifestyle: Care Instructions  Your Care Instructions     A healthy lifestyle can help you feel good, stay at a healthy weight, and have plenty of energy for both work and play. A healthy lifestyle is something you can share with your whole family. A healthy lifestyle also can lower your risk for serious health problems, such as high blood pressure, heart disease, and diabetes. You can follow a few steps listed below to improve your health and the health of your family. Follow-up care is a key part of your treatment and safety. Be sure to make and go to all appointments, and call your doctor if you are having problems. It's also a good idea to know your test results and keep a list of the medicines you take. How can you care for yourself at home? · Do not eat too much sugar, fat, or fast foods. You can still have dessert and treats now and then. The goal is moderation. · Start small to improve your eating habits. Pay attention to portion sizes, drink less juice and soda pop, and eat more fruits and vegetables. ? Eat a healthy amount of food. A 3-ounce serving of meat, for example, is about the size of a deck of cards. Fill the rest of your plate with vegetables and whole grains. ? Limit the amount of soda and sports drinks you have every day. Drink more water when you are thirsty. ? Eat at least 5 servings of fruits and vegetables every day. It may seem like a lot, but it is not hard to reach this goal. A serving or helping is 1 piece of fruit, 1 cup of vegetables, or 2 cups of leafy, raw vegetables. Have an apple or some carrot sticks as an afternoon snack instead of a candy bar. Try to have fruits and/or vegetables at every meal.  · Make exercise part of your daily routine. You may want to start with simple activities, such as walking, bicycling, or slow swimming. Try to be active 30 to 60 minutes every day. You do not need to do all 30 to 60 minutes all at once.  For example, you can exercise 3 times a day for 10 or 20 minutes. Moderate exercise is safe for most people, but it is always a good idea to talk to your doctor before starting an exercise program.  · Keep moving. Ebb McQueeney the lawn, work in the garden, or [employer]. Take the stairs instead of the elevator at work. · If you smoke, quit. People who smoke have an increased risk for heart attack, stroke, cancer, and other lung illnesses. Quitting is hard, but there are ways to boost your chance of quitting tobacco for good. ? Use nicotine gum, patches, or lozenges. ? Ask your doctor about stop-smoking programs and medicines. ? Keep trying. In addition to reducing your risk of diseases in the future, you will notice some benefits soon after you stop using tobacco. If you have shortness of breath or asthma symptoms, they will likely get better within a few weeks after you quit. · Limit how much alcohol you drink. Moderate amounts of alcohol (up to 2 drinks a day for men, 1 drink a day for women) are okay. But drinking too much can lead to liver problems, high blood pressure, and other health problems. Family health  If you have a family, there are many things you can do together to improve your health. · Eat meals together as a family as often as possible. · Eat healthy foods. This includes fruits, vegetables, lean meats and dairy, and whole grains. · Include your family in your fitness plan. Most people think of activities such as jogging or tennis as the way to fitness, but there are many ways you and your family can be more active. Anything that makes you breathe hard and gets your heart pumping is exercise. Here are some tips:  ? Walk to do errands or to take your child to school or the bus.  ? Go for a family bike ride after dinner instead of watching TV. Where can you learn more?   Go to http://www.gray.com/  Enter E414 in the search box to learn more about \"A Healthy Lifestyle: Care Instructions. \"  Current as of: January 31, 2020               Content Version: 12.6  © 0418-3206 [employer]. Care instructions adapted under license by [employer] (which disclaims liability or warranty for this information). If you have questions about a medical condition or this instruction, always ask your healthcare professional. [place] [address] any warranty or liability for your use of this information.

## 2021-03-18 NOTE — PROGRESS NOTES
Chief Complaint   Patient presents with    Follow-up     INR     Patient presents in office today for INR check. Has c/o a sharp pain in her left shoulder that radiates towards her chest.  States that the Percocet is not helping with the pain. Also has c/o being SOB easily. No other concerns. 1. Have you been to the ER, urgent care clinic since your last visit? Hospitalized since your last visit? No    2. Have you seen or consulted any other health care providers outside of the [address] [place] [address] since your last visit? Include any pap smears or colon screening.  No    .  Learning Assessment 6/28/2019   PRIMARY LEARNER Patient   PRIMARY LANGUAGE ENGLISH   LEARNER PREFERENCE PRIMARY LISTENING   ANSWERED BY patient    RELATIONSHIP SELF

## 2021-03-18 NOTE — PROGRESS NOTES
Progress Note    she is a 63y.o. year old female who presents for evalution. Subjective:     Pt is having L shoulder pain states painful in shoulder blade region and anteriorly as well. States it started a month ago and has not done anything for this. Thought maybe just how she was laying. Is not taking any medication for this although she is limited in her choices due to her kidney disease and her chronic anticoagulation. Her regular pain medication has not been helping this at all    Pt for INR check and should be 2-3 and she is a touch low at 1.9. On 3 mwf 2.5 all other days. Need refill or pain medication for her chronic chest and arthritis pain. Does help quite abit. No hx of abuse or misuse.  checked and appropriate. utox UTD  Reviewed PmHx, RxHx, FmHx, SocHx, AllgHx and updated and dated in the chart. Review of Systems - negative except as listed above in the HPI    Objective:     Vitals:    03/18/21 1334   BP: 116/62   Pulse: 71   Resp: 18   Temp: 98.1 °F (36.7 °C)   TempSrc: Temporal   SpO2: 98%   Weight: 289 lb 11 oz (131.4 kg)   Height: 5' 10\" (1.778 m)       Current Outpatient Medications   Medication Sig    carvediloL (COREG) 25 mg tablet TAKE 1 & 1/2 (ONE & ONE-HALF) TABLETS BY MOUTH TWICE DAILY WITH MEALS    pantoprazole (PROTONIX) 40 mg tablet TAKE 1 TABLET BY MOUTH TWICE DAILY FOR HEART    amLODIPine (NORVASC) 10 mg tablet TAKE 1 TABLET BY MOUTH ONCE DAILY FOR HIGH BLOOD PRESSURE    cyclobenzaprine (FLEXERIL) 5 mg tablet Take 1 Tab by mouth two (2) times daily as needed for Muscle Spasm(s).  oxyCODONE-acetaminophen (PERCOCET 7.5) 7.5-325 mg per tablet Take 1 Tab by mouth three (3) times daily for 30 days. Max Daily Amount: 3 Tabs.  isosorbide mononitrate ER (IMDUR) 120 mg CR tablet Take 1 tablet by mouth once daily    warfarin (COUMADIN) 3 mg tablet Take 1 Tab by mouth every other day.     atorvastatin (LIPITOR) 80 mg tablet TAKE 1 TABLET BY MOUTH ONCE DAILY NIGHTLY    sucralfate (CARAFATE) 1 gram tablet Take 1 tablet by mouth 4 times daily    warfarin (COUMADIN) 2.5 mg tablet Take 1 Tab by mouth every other day.  levocetirizine (XYZAL) 5 mg tablet Take 1 Tab by mouth daily.  docusate sodium (STOOL SOFTENER PO) Take  by mouth daily.  nitroglycerin (NITROSTAT) 0.4 mg SL tablet 1 Tab by SubLINGual route every five (5) minutes as needed for Chest Pain. Up to 3 doses.  ergocalciferol (Vitamin D2) 1,250 mcg (50,000 unit) capsule Take 1 Cap by mouth every Tuesday.  allopurinoL (ZYLOPRIM) 100 mg tablet Take 1 tablet by mouth once daily    naloxone (NARCAN) 4 mg/actuation nasal spray Use 1 spray intranasally, then discard. Repeat with new spray every 2 min as needed for opioid overdose symptoms, alternating nostrils.  albuterol (PROAIR HFA) 90 mcg/actuation inhaler Take 2 Puffs by inhalation every four (4) hours as needed for Wheezing.  Oxygen O2 2L NC 24/7     No current facility-administered medications for this visit. Physical Examination: General appearance - alert, well appearing, and in no distress  Musculoskeletal -tenderness over left scapula with light palpation and tenderness over left coracoid process. No palpable deformity there is also tenderness over the rhomboids on the left    Diagnoses and all orders for this visit:    1. Encounter for monitoring Coumadin therapy  -     AMB POC PT/INR  Take 3mg today then resume usual dosing and follow-up in 1 month  2. Left shoulder pain, unspecified chronicity  -     XR SHOULDER LT AP/LAT MIN 2 V; Future    3. Chronic bilateral low back pain, unspecified whether sciatica present  -     oxyCODONE-acetaminophen (PERCOCET 7.5) 7.5-325 mg per tablet; Take 1 Tab by mouth three (3) times daily for 30 days. Max Daily Amount: 3 Tabs. -     oxyCODONE-acetaminophen (PERCOCET 7.5) 7.5-325 mg per tablet; Take 1 Tab by mouth every eight (8) hours as needed for Pain for up to 30 days.  Max Daily Amount: 3 Tabs. -     oxyCODONE-acetaminophen (PERCOCET 7.5) 7.5-325 mg per tablet; Take 1 Tab by mouth every eight (8) hours as needed for Pain for up to 30 days. Max Daily Amount: 3 Tabs. 4. Chronic pain of right knee  -     oxyCODONE-acetaminophen (PERCOCET 7.5) 7.5-325 mg per tablet; Take 1 Tab by mouth three (3) times daily for 30 days. Max Daily Amount: 3 Tabs. -     oxyCODONE-acetaminophen (PERCOCET 7.5) 7.5-325 mg per tablet; Take 1 Tab by mouth every eight (8) hours as needed for Pain for up to 30 days. Max Daily Amount: 3 Tabs. -     oxyCODONE-acetaminophen (PERCOCET 7.5) 7.5-325 mg per tablet; Take 1 Tab by mouth every eight (8) hours as needed for Pain for up to 30 days. Max Daily Amount: 3 Tabs. No change in pain medication indicated. Follow-up and Dispositions    · Return in about 4 weeks (around 4/15/2021), or if symptoms worsen or fail to improve. I have discussed the diagnosis with the patient and the intended plan as seen in the above orders. The patient has received an after-visit summary and questions were answered concerning future plans. Pt conveyed understanding of plan.     Medication Side Effects and Warnings were discussed with patient    An electronic signature was used to authenticate this note  Alden Homans, DO

## 2021-03-22 ENCOUNTER — HOSPITAL ENCOUNTER (OUTPATIENT)
Dept: GENERAL RADIOLOGY | Age: 66
Discharge: HOME | End: 2021-03-22
Payer: MEDICARE

## 2021-03-22 DIAGNOSIS — M25.512 LEFT SHOULDER PAIN, UNSPECIFIED CHRONICITY: ICD-10-CM

## 2021-03-22 PROCEDURE — 73030 X-RAY EXAM OF SHOULDER: CPT

## 2021-03-23 DIAGNOSIS — K21.9 GASTROESOPHAGEAL REFLUX DISEASE: ICD-10-CM

## 2021-03-23 RX ORDER — PANTOPRAZOLE SODIUM 40 MG/1
TABLET, DELAYED RELEASE ORAL
Qty: 60 TAB | Refills: 0 | Status: SHIPPED | OUTPATIENT
Start: 2021-03-23 | End: 2021-05-04

## 2021-03-26 ENCOUNTER — TELEPHONE (OUTPATIENT)
Dept: CASE MANAGEMENT | Age: 66
End: 2021-03-26

## 2021-03-26 NOTE — TELEPHONE ENCOUNTER
Heart Failure Nurse Navigator placed call to and spoke directly to Dorys Cantu. HF NN identified self and explained role of monitoring Cardiomems readings. Informed patient that she has not transmitted a Cardiomems reading since 5/21/2020. Patient does not plan to transmit readings and states \"last time I saw Dr. Keyon Frankel he said since I am on dialysis now, I don't have to take any more readings. \"  Patient informed this HF NN that she is continuing to be followed by Dr. Keyon Frankel and has already seen him in his new practice. HF NN will inform Niki Levine NP that patient's Cardiomems account will be moved to Inactive. Should the patient decide to begin transmitting readings again for Dr. Keyon Frankel to follow, Laine Murillo will be able to reactivate the patient's Cardiomems account in the [facility] system.

## 2021-04-06 ENCOUNTER — TELEPHONE (OUTPATIENT)
Dept: FAMILY MEDICINE CLINIC | Age: 66
End: 2021-04-06

## 2021-04-06 ENCOUNTER — OFFICE VISIT (OUTPATIENT)
Dept: FAMILY MEDICINE CLINIC | Age: 66
End: 2021-04-06
Payer: MEDICARE

## 2021-04-06 VITALS
HEIGHT: 70 IN | TEMPERATURE: 98.9 F | BODY MASS INDEX: 41.37 KG/M2 | SYSTOLIC BLOOD PRESSURE: 115 MMHG | WEIGHT: 289 LBS | HEART RATE: 71 BPM | OXYGEN SATURATION: 94 % | DIASTOLIC BLOOD PRESSURE: 52 MMHG

## 2021-04-06 DIAGNOSIS — M25.512 LEFT SHOULDER PAIN, UNSPECIFIED CHRONICITY: ICD-10-CM

## 2021-04-06 DIAGNOSIS — Z79.01 ENCOUNTER FOR MONITORING COUMADIN THERAPY: ICD-10-CM

## 2021-04-06 DIAGNOSIS — M79.605 LEFT LEG PAIN: ICD-10-CM

## 2021-04-06 DIAGNOSIS — M62.838 NECK MUSCLE SPASM: ICD-10-CM

## 2021-04-06 DIAGNOSIS — I26.99 OTHER PULMONARY EMBOLISM WITHOUT ACUTE COR PULMONALE, UNSPECIFIED CHRONICITY (HCC): Primary | ICD-10-CM

## 2021-04-06 DIAGNOSIS — Z51.81 ENCOUNTER FOR MONITORING COUMADIN THERAPY: ICD-10-CM

## 2021-04-06 LAB
INR BLD: 1.8
PT POC: NORMAL
VALID INTERNAL CONTROL?: YES

## 2021-04-06 PROCEDURE — G8427 DOCREV CUR MEDS BY ELIG CLIN: HCPCS | Performed by: STUDENT IN AN ORGANIZED HEALTH CARE EDUCATION/TRAINING PROGRAM

## 2021-04-06 PROCEDURE — G8417 CALC BMI ABV UP PARAM F/U: HCPCS | Performed by: STUDENT IN AN ORGANIZED HEALTH CARE EDUCATION/TRAINING PROGRAM

## 2021-04-06 PROCEDURE — G0463 HOSPITAL OUTPT CLINIC VISIT: HCPCS | Performed by: STUDENT IN AN ORGANIZED HEALTH CARE EDUCATION/TRAINING PROGRAM

## 2021-04-06 PROCEDURE — 99214 OFFICE O/P EST MOD 30 MIN: CPT | Performed by: STUDENT IN AN ORGANIZED HEALTH CARE EDUCATION/TRAINING PROGRAM

## 2021-04-06 PROCEDURE — 85610 PROTHROMBIN TIME: CPT | Performed by: STUDENT IN AN ORGANIZED HEALTH CARE EDUCATION/TRAINING PROGRAM

## 2021-04-06 PROCEDURE — G9899 SCRN MAM PERF RSLTS DOC: HCPCS | Performed by: STUDENT IN AN ORGANIZED HEALTH CARE EDUCATION/TRAINING PROGRAM

## 2021-04-06 PROCEDURE — G8510 SCR DEP NEG, NO PLAN REQD: HCPCS | Performed by: STUDENT IN AN ORGANIZED HEALTH CARE EDUCATION/TRAINING PROGRAM

## 2021-04-06 PROCEDURE — 3017F COLORECTAL CA SCREEN DOC REV: CPT | Performed by: STUDENT IN AN ORGANIZED HEALTH CARE EDUCATION/TRAINING PROGRAM

## 2021-04-06 PROCEDURE — G9231 DOC ESRD DIA TRANS PREG: HCPCS | Performed by: STUDENT IN AN ORGANIZED HEALTH CARE EDUCATION/TRAINING PROGRAM

## 2021-04-06 RX ORDER — CYCLOBENZAPRINE HCL 5 MG
5 TABLET ORAL
Qty: 30 TAB | Refills: 1 | Status: SHIPPED | OUTPATIENT
Start: 2021-04-06 | End: 2021-08-12 | Stop reason: SDUPTHER

## 2021-04-06 RX ORDER — WARFARIN 2.5 MG/1
2.5 TABLET ORAL EVERY OTHER DAY
Qty: 90 TAB | Refills: 3 | Status: SHIPPED | OUTPATIENT
Start: 2021-04-06 | End: 2021-11-05 | Stop reason: SDUPTHER

## 2021-04-06 NOTE — PATIENT INSTRUCTIONS
Low INR = more normal, \"thicker blood\"  High INR = over therapeutic level, \"thinner blood\"    Vitamin K and warfarin work against each other. Increase your warfarin dose to 3 mg 4 days of the week and 2.5 mg the remaining 3 days. Consistent Vitamin K Diet: Care Instructions  Your Care Instructions     Your body needs vitamin K to clot blood and keep your bones strong. It's found in leafy green vegetables such as kale and spinach. If you take the blood thinner warfarin (Coumadin), you need to be careful about how much vitamin K you get. Vitamin K can keep your warfarin from working as it should. Most people who take warfarin can eat normally. The important thing is to get about the same amount of vitamin K each day. Don't suddenly start eating foods with a lot more or a lot less vitamin K. You can choose how much vitamin K you eat. For example, if you already eat a lot of leafy green vegetables, that's fine. Just keep it about the same amount each day. Follow-up care is a key part of your treatment and safety. Be sure to make and go to all appointments, and call your doctor if you are having problems. It's also a good idea to know your test results and keep a list of the medicines you take. How can you care for yourself at home? You don't need to stop eating foods high in vitamin K. But you do need to know what foods contain vitamin K. Then you can try to eat about the same amount of vitamin K each day. If you have questions about foods with vitamin K, ask if your doctor can refer you to a registered dietitian, an expert in healthy eating. · You might limit foods that are high in vitamin K to about 1 serving a day. These foods have more than 100 micrograms (mcg) of vitamin K in each serving. They include:  ? Cooked leafy green vegetables. Examples are kale, spinach, turnip greens, tl greens, Swiss chard, mustard greens, broccoli, and brussels sprouts.  One serving is ½ cup.  ? Raw leafy green vegetables such as kale, spinach, and endive. · You might limit foods that are moderate in vitamin K to about 3 servings a day. These foods have about 25 to 100 mcg of vitamin K in each serving. These include:  ? Cooked cabbage, okra, and asparagus. One serving is ½ cup.  ? Raw leafy green vegetables. Examples are green leaf lettuce, and nallely lettuce. One serving is 1 cup. · Vitamin K also is found in many multivitamins. You don't need to stop taking your multivitamin if it has vitamin K. But you do need to take it every day. · Check with your doctor before you start or stop taking any supplements or herbal products. Some of these may contain vitamin K. Where can you learn more? Go to http://www.gray.com/  Enter G302 in the search box to learn more about \"Consistent Vitamin K Diet: Care Instructions. \"  Current as of: August 31, 2020               Content Version: 12.8  © 4955-4047 [employer]. Care instructions adapted under license by [employer] (which disclaims liability or warranty for this information). If you have questions about a medical condition or this instruction, always ask your healthcare professional. [place] [address] any warranty or liability for your use of this information.

## 2021-04-06 NOTE — PROGRESS NOTES
Alonzo Collier is a 63 y.o. female , id x 2(name and ). Reviewed record, history, and  medications. Chief Complaint   Patient presents with    Follow-up     INR     Results for orders placed or performed in visit on 21   AMB POC PT/INR   Result Value Ref Range    VALID INTERNAL CONTROL POC Yes     Prothrombin time (POC)      INR POC 1.8          Vitals:    21 1510   BP: (!) 115/52   Pulse: 71   Temp: 98.9 °F (37.2 °C)   SpO2: 94%   Weight: 289 lb (131.1 kg)   Height: 5' 10\" (1.778 m)       Coordination of Care Questionnaire:   1) Have you been to an emergency room, urgent care, or hospitalized since your last visit?   no       2. Have seen or consulted any other health care provider since your last visit? NO      3 most recent PHQ Screens 2021   Little interest or pleasure in doing things Not at all   Feeling down, depressed, irritable, or hopeless Not at all   Total Score PHQ 2 0       Patient is accompanied by spouse I have received verbal consent from Alonzo Collier to discuss any/all medical information while they are present in the room.

## 2021-04-06 NOTE — PROGRESS NOTES
Progress Note    she is a 63y.o. year old female who presents for evalution. Subjective:     Chief Complaint   Patient presents with    Follow-up     INR     Taking 2.5 S/Tu/Th/Sa, 3 mg M/W/F  Eating greens once a month, a couple of times this month  Recently when pulling dialysis needles feels like she's bleeding a little longer. Reviewed PmHx, RxHx, FmHx, SocHx, AllgHx and updated and dated in the chart. Review of Systems - negative except as listed above in the HPI    Objective:     Vitals:    04/06/21 1510   BP: (!) 115/52   Pulse: 71   Temp: 98.9 °F (37.2 °C)   SpO2: 94%   Weight: 289 lb (131.1 kg)   Height: 5' 10\" (1.778 m)       Current Outpatient Medications   Medication Sig    cyclobenzaprine (FLEXERIL) 5 mg tablet Take 1 Tab by mouth two (2) times daily as needed for Muscle Spasm(s).  warfarin (COUMADIN) 2.5 mg tablet Take 1 Tab by mouth every other day.  pantoprazole (PROTONIX) 40 mg tablet TAKE 1 TABLET BY MOUTH TWICE DAILY FOR HEART    [START ON 6/2/2021] oxyCODONE-acetaminophen (PERCOCET 7.5) 7.5-325 mg per tablet Take 1 Tab by mouth three (3) times daily for 30 days. Max Daily Amount: 3 Tabs.  [START ON 5/3/2021] oxyCODONE-acetaminophen (PERCOCET 7.5) 7.5-325 mg per tablet Take 1 Tab by mouth every eight (8) hours as needed for Pain for up to 30 days. Max Daily Amount: 3 Tabs.  oxyCODONE-acetaminophen (PERCOCET 7.5) 7.5-325 mg per tablet Take 1 Tab by mouth every eight (8) hours as needed for Pain for up to 30 days. Max Daily Amount: 3 Tabs.  carvediloL (COREG) 25 mg tablet TAKE 1 & 1/2 (ONE & ONE-HALF) TABLETS BY MOUTH TWICE DAILY WITH MEALS    amLODIPine (NORVASC) 10 mg tablet TAKE 1 TABLET BY MOUTH ONCE DAILY FOR HIGH BLOOD PRESSURE    isosorbide mononitrate ER (IMDUR) 120 mg CR tablet Take 1 tablet by mouth once daily    warfarin (COUMADIN) 3 mg tablet Take 1 Tab by mouth every other day.     atorvastatin (LIPITOR) 80 mg tablet TAKE 1 TABLET BY MOUTH ONCE DAILY NIGHTLY    sucralfate (CARAFATE) 1 gram tablet Take 1 tablet by mouth 4 times daily    levocetirizine (XYZAL) 5 mg tablet Take 1 Tab by mouth daily.  docusate sodium (STOOL SOFTENER PO) Take  by mouth daily.  nitroglycerin (NITROSTAT) 0.4 mg SL tablet 1 Tab by SubLINGual route every five (5) minutes as needed for Chest Pain. Up to 3 doses.  ergocalciferol (Vitamin D2) 1,250 mcg (50,000 unit) capsule Take 1 Cap by mouth every Tuesday.  allopurinoL (ZYLOPRIM) 100 mg tablet Take 1 tablet by mouth once daily    naloxone (NARCAN) 4 mg/actuation nasal spray Use 1 spray intranasally, then discard. Repeat with new spray every 2 min as needed for opioid overdose symptoms, alternating nostrils.  albuterol (PROAIR HFA) 90 mcg/actuation inhaler Take 2 Puffs by inhalation every four (4) hours as needed for Wheezing.  Oxygen O2 2L NC 24/7     No current facility-administered medications for this visit. Physical Exam  Vitals signs and nursing note reviewed. Constitutional:       General: She is not in acute distress. Appearance: Normal appearance. She is obese. She is not ill-appearing, toxic-appearing or diaphoretic. HENT:      Head: Normocephalic and atraumatic. Eyes:      General: No scleral icterus. Right eye: No discharge. Left eye: No discharge. Conjunctiva/sclera: Conjunctivae normal.   Neck:      Musculoskeletal: No neck rigidity. Cardiovascular:      Rate and Rhythm: Normal rate and regular rhythm. Pulses: Normal pulses. Heart sounds: Normal heart sounds. Pulmonary:      Effort: Pulmonary effort is normal. No respiratory distress. Breath sounds: Normal breath sounds. Musculoskeletal:         General: No tenderness. Right lower leg: Edema present. Left lower leg: Edema present. Skin:     General: Skin is warm and dry. Findings: No bruising. Neurological:      General: No focal deficit present.       Mental Status: She is alert.   Psychiatric:         Mood and Affect: Mood normal.         Behavior: Behavior normal.         Thought Content: Thought content normal.         Judgment: Judgment normal.            Assessment/ Plan:   Diagnoses and all orders for this visit:    1. Other pulmonary embolism without acute cor pulmonale, unspecified chronicity (HCC) -INR subtherapeutic. Subtherapeutic last month as well. Discussed need for consistent vitamin K intake in order for dose adjustment to maintain consistent INR. Discussed increase of weekly total warfarin. Patient in agreement to increase dose by 0.5 mg weekly. 3 mg 4 times a week, 2.5 mg 3 times a week. Follow-up in 2 weeks for INR check. May require further increase of weekly total dose  -     warfarin (COUMADIN) 2.5 mg tablet; Take 1 Tab by mouth every other day. 2. Encounter for monitoring Coumadin therapy  -     AMB POC PT/INR    Lab Results   Component Value Date/Time    INR 1.2 (H) 05/28/2019 06:20 AM    INR 1.2 (H) 05/27/2019 12:50 PM    INR 1.2 (H) 05/26/2019 03:53 AM    INR POC 1.8 04/06/2021 03:16 PM    INR POC 1.9 03/18/2021 01:45 PM    INR POC 1.7 02/17/2021 11:00 AM    Prothrombin time 11.8 (H) 05/28/2019 06:20 AM    Prothrombin time 11.8 (H) 05/27/2019 12:50 PM    Prothrombin time 12.3 (H) 05/26/2019 03:53 AM         3. Left leg pain -patient reports left leg pain similar to pain on right when she was previously diagnosed with a DVT. Currently subtherapeutic on anticoagulation, will assess further with venous duplex. No current signs or symptoms of PE, reviewed these and encouraged emergent evaluation and treatment should she develop any of them. -     DUPLEX LOWER EXT VENOUS LEFT; Future    4. Neck muscle spasm  -     cyclobenzaprine (FLEXERIL) 5 mg tablet; Take 1 Tab by mouth two (2) times daily as needed for Muscle Spasm(s). 5. Left shoulder pain, unspecified chronicity -reviewed results of x-ray with patient.   Explained the meaning of osteophyte and diagnosis of osteoarthritis. Discussed management with topical Voltaren, ice and heat, mobilization and strengthening exercises       Follow-up and Dispositions    · Return in about 2 weeks (around 4/20/2021) for INR. I have discussed the diagnosis with the patient and the intended plan as seen in the above orders. The patient has received an after-visit summary and questions were answered concerning future plans. Pt conveyed understanding of plan.     Medication Side Effects and Warnings were discussed with patient      Nicole Frost MD

## 2021-04-06 NOTE — TELEPHONE ENCOUNTER
Called and spoke with patient. She states that she does not have any active bleeding but that she has been bleeding more than usually at dialysis when they remove the needle and takes longer to clot. Apt scheduled for today at 3:15pm with Dr. Morales Patricia.

## 2021-04-25 RX ORDER — ERGOCALCIFEROL 1.25 MG/1
CAPSULE ORAL
Qty: 12 CAP | Refills: 0 | Status: SHIPPED | OUTPATIENT
Start: 2021-04-25 | End: 2021-04-25 | Stop reason: SDUPTHER

## 2021-04-25 RX ORDER — ERGOCALCIFEROL 1.25 MG/1
CAPSULE ORAL
Qty: 12 CAP | Refills: 0 | Status: SHIPPED | OUTPATIENT
Start: 2021-04-25 | End: 2021-07-26

## 2021-04-28 ENCOUNTER — OFFICE VISIT (OUTPATIENT)
Dept: FAMILY MEDICINE CLINIC | Age: 66
End: 2021-04-28
Payer: MEDICARE

## 2021-04-28 VITALS
HEIGHT: 70 IN | RESPIRATION RATE: 18 BRPM | TEMPERATURE: 98.2 F | DIASTOLIC BLOOD PRESSURE: 52 MMHG | HEART RATE: 73 BPM | WEIGHT: 285 LBS | OXYGEN SATURATION: 94 % | SYSTOLIC BLOOD PRESSURE: 130 MMHG | BODY MASS INDEX: 40.8 KG/M2

## 2021-04-28 DIAGNOSIS — R07.81 RIB PAIN ON LEFT SIDE: ICD-10-CM

## 2021-04-28 DIAGNOSIS — I26.99 OTHER PULMONARY EMBOLISM WITHOUT ACUTE COR PULMONALE, UNSPECIFIED CHRONICITY (HCC): Primary | ICD-10-CM

## 2021-04-28 LAB
INR BLD: 3.4
PT POC: 40.7 SECONDS
VALID INTERNAL CONTROL?: YES

## 2021-04-28 PROCEDURE — G8417 CALC BMI ABV UP PARAM F/U: HCPCS | Performed by: FAMILY MEDICINE

## 2021-04-28 PROCEDURE — 85610 PROTHROMBIN TIME: CPT | Performed by: FAMILY MEDICINE

## 2021-04-28 PROCEDURE — G9231 DOC ESRD DIA TRANS PREG: HCPCS | Performed by: FAMILY MEDICINE

## 2021-04-28 PROCEDURE — G9899 SCRN MAM PERF RSLTS DOC: HCPCS | Performed by: FAMILY MEDICINE

## 2021-04-28 PROCEDURE — G8427 DOCREV CUR MEDS BY ELIG CLIN: HCPCS | Performed by: FAMILY MEDICINE

## 2021-04-28 PROCEDURE — G8510 SCR DEP NEG, NO PLAN REQD: HCPCS | Performed by: FAMILY MEDICINE

## 2021-04-28 PROCEDURE — G0463 HOSPITAL OUTPT CLINIC VISIT: HCPCS | Performed by: FAMILY MEDICINE

## 2021-04-28 PROCEDURE — 99214 OFFICE O/P EST MOD 30 MIN: CPT | Performed by: FAMILY MEDICINE

## 2021-04-28 PROCEDURE — 3017F COLORECTAL CA SCREEN DOC REV: CPT | Performed by: FAMILY MEDICINE

## 2021-04-28 RX ORDER — ISOSORBIDE MONONITRATE 120 MG/1
TABLET, EXTENDED RELEASE ORAL
Qty: 90 TAB | Refills: 3 | Status: SHIPPED | OUTPATIENT
Start: 2021-04-28 | End: 2022-05-05 | Stop reason: SDUPTHER

## 2021-04-28 NOTE — PROGRESS NOTES
Chief Complaint   Patient presents with    Labs     INR    Shoulder Pain     Can not raise arm, in extreme pain. Patient presents in office for INR    Pt expressed concern with her left shoulder. 1. Have you been to the ER, urgent care clinic since your last visit? Hospitalized since your last visit? No    2. Have you seen or consulted any other health care providers outside of the [address] [place] [address] since your last visit? Include any pap smears or colon screening.  No

## 2021-04-28 NOTE — PATIENT INSTRUCTIONS
A Healthy Lifestyle: Care Instructions  Your Care Instructions     A healthy lifestyle can help you feel good, stay at a healthy weight, and have plenty of energy for both work and play. A healthy lifestyle is something you can share with your whole family. A healthy lifestyle also can lower your risk for serious health problems, such as high blood pressure, heart disease, and diabetes. You can follow a few steps listed below to improve your health and the health of your family. Follow-up care is a key part of your treatment and safety. Be sure to make and go to all appointments, and call your doctor if you are having problems. It's also a good idea to know your test results and keep a list of the medicines you take. How can you care for yourself at home? · Do not eat too much sugar, fat, or fast foods. You can still have dessert and treats now and then. The goal is moderation. · Start small to improve your eating habits. Pay attention to portion sizes, drink less juice and soda pop, and eat more fruits and vegetables. ? Eat a healthy amount of food. A 3-ounce serving of meat, for example, is about the size of a deck of cards. Fill the rest of your plate with vegetables and whole grains. ? Limit the amount of soda and sports drinks you have every day. Drink more water when you are thirsty. ? Eat plenty of fruits and vegetables every day. Have an apple or some carrot sticks as an afternoon snack instead of a candy bar. Try to have fruits and/or vegetables at every meal.  · Make exercise part of your daily routine. You may want to start with simple activities, such as walking, bicycling, or slow swimming. Try to be active 30 to 60 minutes every day. You do not need to do all 30 to 60 minutes all at once. For example, you can exercise 3 times a day for 10 or 20 minutes.  Moderate exercise is safe for most people, but it is always a good idea to talk to your doctor before starting an exercise program.  · Keep moving. [employer] the lawn, work in the garden, or [employer]. Take the stairs instead of the elevator at work. · If you smoke, quit. People who smoke have an increased risk for heart attack, stroke, cancer, and other lung illnesses. Quitting is hard, but there are ways to boost your chance of quitting tobacco for good. ? Use nicotine gum, patches, or lozenges. ? Ask your doctor about stop-smoking programs and medicines. ? Keep trying. In addition to reducing your risk of diseases in the future, you will notice some benefits soon after you stop using tobacco. If you have shortness of breath or asthma symptoms, they will likely get better within a few weeks after you quit. · Limit how much alcohol you drink. Moderate amounts of alcohol (up to 2 drinks a day for men, 1 drink a day for women) are okay. But drinking too much can lead to liver problems, high blood pressure, and other health problems. Family health  If you have a family, there are many things you can do together to improve your health. · Eat meals together as a family as often as possible. · Eat healthy foods. This includes fruits, vegetables, lean meats and dairy, and whole grains. · Include your family in your fitness plan. Most people think of activities such as jogging or tennis as the way to fitness, but there are many ways you and your family can be more active. Anything that makes you breathe hard and gets your heart pumping is exercise. Here are some tips:  ? Walk to do errands or to take your child to school or the bus.  ? Go for a family bike ride after dinner instead of watching TV. Where can you learn more? Go to http://www.gray.com/  Enter N874 in the search box to learn more about \"A Healthy Lifestyle: Care Instructions. \"  Current as of: September 23, 2020               Content Version: 12.8  © 9189-2333 Healthwise, Incorporated.    Care instructions adapted under license by [employer] (which disclaims liability or warranty for this information). If you have questions about a medical condition or this instruction, always ask your healthcare professional. [address] any warranty or liability for your use of this information.

## 2021-04-28 NOTE — PROGRESS NOTES
Progress Note    she is a 63y.o. year old female who presents for evalution. Subjective:     Pt here for INR nd she was low last week at 1.8 and now after a slight dose adjustment she is at 3.4. \"Increase dose by 0.5 mg weekly. 3 mg 4 times a week, 2.5 mg 3 times a week. \"  Her diet has not changed. She was slightly low the month before and had her take an extra pill then resume her usual.  Will have her resume her previous dosing and recheck in 2 weeks. Pt states she is having continued shoulder pain and is coming thru from back to front. States under her arm is very sore as well. This is very tender to palpation, no masses. She is UTD on mammo. Tried voltaren gel and is not helping. Cardiologist moved and pt needs refill of imdur. Reviewed PmHx, RxHx, FmHx, SocHx, AllgHx and updated and dated in the chart. Review of Systems - negative except as listed above in the HPI    Objective:     Vitals:    04/28/21 1529   BP: (!) 130/52   Pulse: 73   Resp: 18   Temp: 98.2 °F (36.8 °C)   TempSrc: Oral   SpO2: 94%   Weight: 285 lb (129.3 kg)   Height: 5' 10\" (1.778 m)       Current Outpatient Medications   Medication Sig    isosorbide mononitrate ER (IMDUR) 120 mg CR tablet 1 tab PO qday    ergocalciferol (ERGOCALCIFEROL) 1,250 mcg (50,000 unit) capsule TAKE 1 CAPSULE BY MOUTH EVERY TUESDAY    cyclobenzaprine (FLEXERIL) 5 mg tablet Take 1 Tab by mouth two (2) times daily as needed for Muscle Spasm(s).  warfarin (COUMADIN) 2.5 mg tablet Take 1 Tab by mouth every other day.  pantoprazole (PROTONIX) 40 mg tablet TAKE 1 TABLET BY MOUTH TWICE DAILY FOR HEART    [START ON 6/2/2021] oxyCODONE-acetaminophen (PERCOCET 7.5) 7.5-325 mg per tablet Take 1 Tab by mouth three (3) times daily for 30 days. Max Daily Amount: 3 Tabs.  [START ON 5/3/2021] oxyCODONE-acetaminophen (PERCOCET 7.5) 7.5-325 mg per tablet Take 1 Tab by mouth every eight (8) hours as needed for Pain for up to 30 days.  Max Daily Amount: 3 Tabs.  oxyCODONE-acetaminophen (PERCOCET 7.5) 7.5-325 mg per tablet Take 1 Tab by mouth every eight (8) hours as needed for Pain for up to 30 days. Max Daily Amount: 3 Tabs.  carvediloL (COREG) 25 mg tablet TAKE 1 & 1/2 (ONE & ONE-HALF) TABLETS BY MOUTH TWICE DAILY WITH MEALS    amLODIPine (NORVASC) 10 mg tablet TAKE 1 TABLET BY MOUTH ONCE DAILY FOR HIGH BLOOD PRESSURE    warfarin (COUMADIN) 3 mg tablet Take 1 Tab by mouth every other day.  atorvastatin (LIPITOR) 80 mg tablet TAKE 1 TABLET BY MOUTH ONCE DAILY NIGHTLY    sucralfate (CARAFATE) 1 gram tablet Take 1 tablet by mouth 4 times daily    levocetirizine (XYZAL) 5 mg tablet Take 1 Tab by mouth daily.  docusate sodium (STOOL SOFTENER PO) Take  by mouth daily.  nitroglycerin (NITROSTAT) 0.4 mg SL tablet 1 Tab by SubLINGual route every five (5) minutes as needed for Chest Pain. Up to 3 doses.  allopurinoL (ZYLOPRIM) 100 mg tablet Take 1 tablet by mouth once daily    naloxone (NARCAN) 4 mg/actuation nasal spray Use 1 spray intranasally, then discard. Repeat with new spray every 2 min as needed for opioid overdose symptoms, alternating nostrils.  albuterol (PROAIR HFA) 90 mcg/actuation inhaler Take 2 Puffs by inhalation every four (4) hours as needed for Wheezing.  Oxygen O2 2L NC 24/7     No current facility-administered medications for this visit. Physical Examination: General appearance - alert, well appearing, and in no distress  Musculoskeletal -tenderness to palpation over left latissimus dorsi and left lateral ribs. There are no palpable masses/lymph nodes. Assessment/ Plan:   Diagnoses and all orders for this visit:    1. Other pulmonary embolism without acute cor pulmonale, unspecified chronicity (HCC)  -     AMB POC PT/INR  3 mg 3 times a week, 2.5 mg 4 times a week and recheck in 2 weeks   2.  Rib pain on left side  -     XR RIBS LT UNI 2 V; Future  Seems to be more musculoskeletal in nature if the rib x-ray comes back negative we will consider a prednisone tapering pack to help with pain/inflammation  Other orders  -     isosorbide mononitrate ER (IMDUR) 120 mg CR tablet; 1 tab PO qday       Follow-up and Dispositions    · Return if symptoms worsen or fail to improve. I have discussed the diagnosis with the patient and the intended plan as seen in the above orders. The patient has received an after-visit summary and questions were answered concerning future plans. Pt conveyed understanding of plan.     Medication Side Effects and Warnings were discussed with patient    An electronic signature was used to authenticate this note  Merced Morales DO

## 2021-04-29 ENCOUNTER — HOSPITAL ENCOUNTER (OUTPATIENT)
Dept: GENERAL RADIOLOGY | Age: 66
Discharge: HOME | End: 2021-04-29
Payer: MEDICARE

## 2021-04-29 DIAGNOSIS — R07.81 RIB PAIN ON LEFT SIDE: ICD-10-CM

## 2021-04-29 PROCEDURE — 71101 X-RAY EXAM UNILAT RIBS/CHEST: CPT

## 2021-04-30 RX ORDER — PREDNISONE 10 MG/1
TABLET ORAL
Qty: 21 TAB | Refills: 0 | Status: SHIPPED | OUTPATIENT
Start: 2021-04-30 | End: 2022-04-12

## 2021-05-01 DIAGNOSIS — K21.9 GASTROESOPHAGEAL REFLUX DISEASE: ICD-10-CM

## 2021-05-01 DIAGNOSIS — M10.372 ACUTE GOUT DUE TO RENAL IMPAIRMENT INVOLVING LEFT FOOT: ICD-10-CM

## 2021-05-04 DIAGNOSIS — K21.9 GASTROESOPHAGEAL REFLUX DISEASE: ICD-10-CM

## 2021-05-04 DIAGNOSIS — M10.372 ACUTE GOUT DUE TO RENAL IMPAIRMENT INVOLVING LEFT FOOT: ICD-10-CM

## 2021-05-04 RX ORDER — ALLOPURINOL 100 MG/1
TABLET ORAL
Qty: 90 TAB | Refills: 4 | Status: SHIPPED | OUTPATIENT
Start: 2021-05-04 | End: 2021-05-04 | Stop reason: SDUPTHER

## 2021-05-04 RX ORDER — PANTOPRAZOLE SODIUM 40 MG/1
TABLET, DELAYED RELEASE ORAL
Qty: 180 TAB | Refills: 4 | Status: SHIPPED | OUTPATIENT
Start: 2021-05-04 | End: 2021-11-05

## 2021-05-04 RX ORDER — ALLOPURINOL 100 MG/1
TABLET ORAL
Qty: 90 TAB | Refills: 4 | Status: SHIPPED | OUTPATIENT
Start: 2021-05-04 | End: 2022-05-05 | Stop reason: SDUPTHER

## 2021-05-04 RX ORDER — PANTOPRAZOLE SODIUM 40 MG/1
TABLET, DELAYED RELEASE ORAL
Qty: 180 TAB | Refills: 4 | Status: SHIPPED | OUTPATIENT
Start: 2021-05-04 | End: 2021-05-04 | Stop reason: SDUPTHER

## 2021-05-12 ENCOUNTER — OFFICE VISIT (OUTPATIENT)
Dept: FAMILY MEDICINE CLINIC | Age: 66
End: 2021-05-12
Payer: MEDICARE

## 2021-05-12 VITALS
WEIGHT: 289.68 LBS | SYSTOLIC BLOOD PRESSURE: 106 MMHG | RESPIRATION RATE: 16 BRPM | HEART RATE: 72 BPM | BODY MASS INDEX: 41.47 KG/M2 | TEMPERATURE: 98.5 F | HEIGHT: 70 IN | DIASTOLIC BLOOD PRESSURE: 66 MMHG | OXYGEN SATURATION: 91 %

## 2021-05-12 DIAGNOSIS — L89.303 PRESSURE INJURY OF BUTTOCK, STAGE 3, UNSPECIFIED LATERALITY (HCC): ICD-10-CM

## 2021-05-12 DIAGNOSIS — I48.20 CHRONIC ATRIAL FIBRILLATION (HCC): ICD-10-CM

## 2021-05-12 DIAGNOSIS — E11.59 DM TYPE 2 CAUSING VASCULAR DISEASE (HCC): ICD-10-CM

## 2021-05-12 DIAGNOSIS — Z51.81 ENCOUNTER FOR MONITORING COUMADIN THERAPY: Primary | ICD-10-CM

## 2021-05-12 DIAGNOSIS — D68.9 COAGULATION DEFECT (HCC): ICD-10-CM

## 2021-05-12 DIAGNOSIS — J84.9 ILD (INTERSTITIAL LUNG DISEASE) (HCC): ICD-10-CM

## 2021-05-12 DIAGNOSIS — M25.512 LEFT SHOULDER PAIN, UNSPECIFIED CHRONICITY: ICD-10-CM

## 2021-05-12 DIAGNOSIS — I27.20 PULMONARY HYPERTENSION (HCC): ICD-10-CM

## 2021-05-12 DIAGNOSIS — Z79.01 ENCOUNTER FOR MONITORING COUMADIN THERAPY: Primary | ICD-10-CM

## 2021-05-12 DIAGNOSIS — B37.0 THRUSH, ORAL: ICD-10-CM

## 2021-05-12 DIAGNOSIS — E23.7 PITUITARY ABNORMALITY (HCC): ICD-10-CM

## 2021-05-12 LAB
INR BLD: 2.9
PT POC: 35.2 SECONDS
VALID INTERNAL CONTROL?: YES

## 2021-05-12 PROCEDURE — G8432 DEP SCR NOT DOC, RNG: HCPCS | Performed by: FAMILY MEDICINE

## 2021-05-12 PROCEDURE — 2022F DILAT RTA XM EVC RTNOPTHY: CPT | Performed by: FAMILY MEDICINE

## 2021-05-12 PROCEDURE — 99214 OFFICE O/P EST MOD 30 MIN: CPT | Performed by: FAMILY MEDICINE

## 2021-05-12 PROCEDURE — 3017F COLORECTAL CA SCREEN DOC REV: CPT | Performed by: FAMILY MEDICINE

## 2021-05-12 PROCEDURE — G9899 SCRN MAM PERF RSLTS DOC: HCPCS | Performed by: FAMILY MEDICINE

## 2021-05-12 PROCEDURE — 85610 PROTHROMBIN TIME: CPT | Performed by: FAMILY MEDICINE

## 2021-05-12 PROCEDURE — G0463 HOSPITAL OUTPT CLINIC VISIT: HCPCS | Performed by: FAMILY MEDICINE

## 2021-05-12 PROCEDURE — G8427 DOCREV CUR MEDS BY ELIG CLIN: HCPCS | Performed by: FAMILY MEDICINE

## 2021-05-12 PROCEDURE — G9231 DOC ESRD DIA TRANS PREG: HCPCS | Performed by: FAMILY MEDICINE

## 2021-05-12 PROCEDURE — G8417 CALC BMI ABV UP PARAM F/U: HCPCS | Performed by: FAMILY MEDICINE

## 2021-05-12 PROCEDURE — 3046F HEMOGLOBIN A1C LEVEL >9.0%: CPT | Performed by: FAMILY MEDICINE

## 2021-05-12 RX ORDER — NYSTATIN 100000 [USP'U]/ML
1 SUSPENSION ORAL 4 TIMES DAILY
Qty: 180 ML | Refills: 0 | Status: SHIPPED | OUTPATIENT
Start: 2021-05-12 | End: 2021-05-21

## 2021-05-12 NOTE — PROGRESS NOTES
Progress Note    she is a 63y.o. year old female who presents for evalution. Subjective:     Pt for INR check and is therapeutic currently. Will c/w current dosing to recheck in 3 weeks. She had xray done and this was for her pain and showed inflammatory arthritis glenohumeral joint. Gave pred and did not help. Pt is also having pain in her mouth, feels raw and irritated no ulcerations. Recently on pred which may be controbuting. Buttocks ulcer is almost healed keeping A&E on it. Reviewed PmHx, RxHx, FmHx, SocHx, AllgHx and updated and dated in the chart. Review of Systems - negative except as listed above in the HPI    Objective:     Vitals:    05/12/21 1319   BP: 106/66   Pulse: 72   Resp: 16   Temp: 98.5 °F (36.9 °C)   TempSrc: Oral   SpO2: 91%   Weight: 289 lb 11 oz (131.4 kg)   Height: 5' 10\" (1.778 m)       Current Outpatient Medications   Medication Sig    nystatin (MYCOSTATIN) 100,000 unit/mL suspension Take 5 mL by mouth four (4) times daily for 9 days. swish and spit    allopurinoL (ZYLOPRIM) 100 mg tablet Take 1 tablet by mouth once daily    pantoprazole (PROTONIX) 40 mg tablet TAKE 1 TABLET BY MOUTH TWICE DAILY FOR HEART    predniSONE (STERAPRED DS) 10 mg dose pack See administration instruction per 10mg dose pack    isosorbide mononitrate ER (IMDUR) 120 mg CR tablet 1 tab PO qday    ergocalciferol (ERGOCALCIFEROL) 1,250 mcg (50,000 unit) capsule TAKE 1 CAPSULE BY MOUTH EVERY TUESDAY    cyclobenzaprine (FLEXERIL) 5 mg tablet Take 1 Tab by mouth two (2) times daily as needed for Muscle Spasm(s).  warfarin (COUMADIN) 2.5 mg tablet Take 1 Tab by mouth every other day.  [START ON 6/2/2021] oxyCODONE-acetaminophen (PERCOCET 7.5) 7.5-325 mg per tablet Take 1 Tab by mouth three (3) times daily for 30 days. Max Daily Amount: 3 Tabs.     oxyCODONE-acetaminophen (PERCOCET 7.5) 7.5-325 mg per tablet Take 1 Tab by mouth every eight (8) hours as needed for Pain for up to 30 days. Max Daily Amount: 3 Tabs.  carvediloL (COREG) 25 mg tablet TAKE 1 & 1/2 (ONE & ONE-HALF) TABLETS BY MOUTH TWICE DAILY WITH MEALS    amLODIPine (NORVASC) 10 mg tablet TAKE 1 TABLET BY MOUTH ONCE DAILY FOR HIGH BLOOD PRESSURE    warfarin (COUMADIN) 3 mg tablet Take 1 Tab by mouth every other day.  atorvastatin (LIPITOR) 80 mg tablet TAKE 1 TABLET BY MOUTH ONCE DAILY NIGHTLY    sucralfate (CARAFATE) 1 gram tablet Take 1 tablet by mouth 4 times daily    levocetirizine (XYZAL) 5 mg tablet Take 1 Tab by mouth daily.  docusate sodium (STOOL SOFTENER PO) Take  by mouth daily.  nitroglycerin (NITROSTAT) 0.4 mg SL tablet 1 Tab by SubLINGual route every five (5) minutes as needed for Chest Pain. Up to 3 doses.  naloxone (NARCAN) 4 mg/actuation nasal spray Use 1 spray intranasally, then discard. Repeat with new spray every 2 min as needed for opioid overdose symptoms, alternating nostrils.  albuterol (PROAIR HFA) 90 mcg/actuation inhaler Take 2 Puffs by inhalation every four (4) hours as needed for Wheezing.  Oxygen O2 2L NC 24/7     No current facility-administered medications for this visit. Physical Examination: General appearance - alert, well appearing, and in no distress  Mental status - alert, oriented to person, place, and time      Assessment/ Plan:   Diagnoses and all orders for this visit:    1. Encounter for monitoring Coumadin therapy  -     AMB POC PT/INR  At goal continue with 3 mg 3 times a week, 2.5 mg 4 times a week   2. Thrush, oral  -     nystatin (MYCOSTATIN) 100,000 unit/mL suspension; Take 5 mL by mouth four (4) times daily for 9 days. swish and spit    3. Pressure injury of buttock, stage 3, unspecified laterality ([address]  Continue with A&E ointment and offloading. 4. Pulmonary hypertension ([address]  Seeing pulmonology  5. DM type 2 causing vascular disease (HCC)  A1c has been normal checking annually.   6. Chronic atrial fibrillation (HCC)  On chronic anticoagulation  7. ILD (interstitial lung disease) ([facility] [address]  Seeing pulmonology  8. Pituitary abnormality (HCC)    9. Coagulation defect ([facility] [address]  On Coumadin  10. Left shoulder pain, unspecified chronicity  For to orthopedics she has specific orthopedic she would like to see. Follow-up and Dispositions    · Return in about 3 weeks (around 6/2/2021), or if symptoms worsen or fail to improve. I have discussed the diagnosis with the patient and the intended plan as seen in the above orders. The patient has received an after-visit summary and questions were answered concerning future plans. Pt conveyed understanding of plan.     Medication Side Effects and Warnings were discussed with patient    An electronic signature was used to authenticate this note  Lisandro Mcgovern, DO

## 2021-05-12 NOTE — PATIENT INSTRUCTIONS
A Healthy Lifestyle: Care Instructions  Your Care Instructions     A healthy lifestyle can help you feel good, stay at a healthy weight, and have plenty of energy for both work and play. A healthy lifestyle is something you can share with your whole family. A healthy lifestyle also can lower your risk for serious health problems, such as high blood pressure, heart disease, and diabetes. You can follow a few steps listed below to improve your health and the health of your family. Follow-up care is a key part of your treatment and safety. Be sure to make and go to all appointments, and call your doctor if you are having problems. It's also a good idea to know your test results and keep a list of the medicines you take. How can you care for yourself at home? · Do not eat too much sugar, fat, or fast foods. You can still have dessert and treats now and then. The goal is moderation. · Start small to improve your eating habits. Pay attention to portion sizes, drink less juice and soda pop, and eat more fruits and vegetables. ? Eat a healthy amount of food. A 3-ounce serving of meat, for example, is about the size of a deck of cards. Fill the rest of your plate with vegetables and whole grains. ? Limit the amount of soda and sports drinks you have every day. Drink more water when you are thirsty. ? Eat plenty of fruits and vegetables every day. Have an apple or some carrot sticks as an afternoon snack instead of a candy bar. Try to have fruits and/or vegetables at every meal.  · Make exercise part of your daily routine. You may want to start with simple activities, such as walking, bicycling, or slow swimming. Try to be active 30 to 60 minutes every day. You do not need to do all 30 to 60 minutes all at once. For example, you can exercise 3 times a day for 10 or 20 minutes.  Moderate exercise is safe for most people, but it is always a good idea to talk to your doctor before starting an exercise program.  · Keep moving. [employer] the lawn, work in the garden, or [employer]. Take the stairs instead of the elevator at work. · If you smoke, quit. People who smoke have an increased risk for heart attack, stroke, cancer, and other lung illnesses. Quitting is hard, but there are ways to boost your chance of quitting tobacco for good. ? Use nicotine gum, patches, or lozenges. ? Ask your doctor about stop-smoking programs and medicines. ? Keep trying. In addition to reducing your risk of diseases in the future, you will notice some benefits soon after you stop using tobacco. If you have shortness of breath or asthma symptoms, they will likely get better within a few weeks after you quit. · Limit how much alcohol you drink. Moderate amounts of alcohol (up to 2 drinks a day for men, 1 drink a day for women) are okay. But drinking too much can lead to liver problems, high blood pressure, and other health problems. Family health  If you have a family, there are many things you can do together to improve your health. · Eat meals together as a family as often as possible. · Eat healthy foods. This includes fruits, vegetables, lean meats and dairy, and whole grains. · Include your family in your fitness plan. Most people think of activities such as jogging or tennis as the way to fitness, but there are many ways you and your family can be more active. Anything that makes you breathe hard and gets your heart pumping is exercise. Here are some tips:  ? Walk to do errands or to take your child to school or the bus.  ? Go for a family bike ride after dinner instead of watching TV. Where can you learn more? Go to http://www.gray.com/  Enter V795 in the search box to learn more about \"A Healthy Lifestyle: Care Instructions. \"  Current as of: September 23, 2020               Content Version: 12.8  © 7507-1055 Healthwise, Incorporated.    Care instructions adapted under license by [employer] (which disclaims liability or warranty for this information). If you have questions about a medical condition or this instruction, always ask your healthcare professional. [address] any warranty or liability for your use of this information.

## 2021-05-13 DIAGNOSIS — I10 ESSENTIAL HYPERTENSION: Chronic | ICD-10-CM

## 2021-05-14 RX ORDER — AMLODIPINE BESYLATE 10 MG/1
TABLET ORAL
Qty: 90 TAB | Refills: 4 | Status: SHIPPED | OUTPATIENT
Start: 2021-05-14 | End: 2022-04-12

## 2021-05-19 LAB — SARS-COV-2, NAA: NEGATIVE

## 2021-05-26 ENCOUNTER — TELEPHONE (OUTPATIENT)
Dept: FAMILY MEDICINE CLINIC | Age: 66
End: 2021-05-26

## 2021-05-26 RX ORDER — MUPIROCIN 20 MG/G
OINTMENT TOPICAL 2 TIMES DAILY
Qty: 30 G | Refills: 1 | Status: SHIPPED | OUTPATIENT
Start: 2021-05-26 | End: 2022-05-28

## 2021-05-26 NOTE — TELEPHONE ENCOUNTER
----- Message from Aria Belcher sent at 5/26/2021 12:02 PM EDT -----  Regarding: DO Levine/Telephone  General Message/Vendor Calls    Caller's first and last name: Rosemarie Monterroso      Reason for call: antibiotic ointment      Callback required yes/no and why: Yes      Best contact number(s): [phone]      Details to clarify the request: Pt needs the antibiotic ointment that she had previously had prescribed by DO Levine for her infection on her backside. Pt's infection has opened up and is now bleeding.        Aria Belcher

## 2021-06-03 ENCOUNTER — OFFICE VISIT (OUTPATIENT)
Dept: FAMILY MEDICINE CLINIC | Age: 66
End: 2021-06-03
Payer: MEDICARE

## 2021-06-03 VITALS
TEMPERATURE: 98.3 F | OXYGEN SATURATION: 90 % | HEIGHT: 70 IN | BODY MASS INDEX: 41.57 KG/M2 | HEART RATE: 81 BPM | SYSTOLIC BLOOD PRESSURE: 117 MMHG | RESPIRATION RATE: 18 BRPM | DIASTOLIC BLOOD PRESSURE: 63 MMHG

## 2021-06-03 DIAGNOSIS — I25.118 CORONARY ARTERY DISEASE OF NATIVE ARTERY OF NATIVE HEART WITH STABLE ANGINA PECTORIS (HCC): ICD-10-CM

## 2021-06-03 DIAGNOSIS — Z51.81 ENCOUNTER FOR MONITORING COUMADIN THERAPY: ICD-10-CM

## 2021-06-03 DIAGNOSIS — Z79.01 ENCOUNTER FOR MONITORING COUMADIN THERAPY: ICD-10-CM

## 2021-06-03 DIAGNOSIS — I10 ESSENTIAL HYPERTENSION: Primary | ICD-10-CM

## 2021-06-03 LAB
INR BLD: 24.1
PT POC: 2 SECONDS
VALID INTERNAL CONTROL?: YES

## 2021-06-03 PROCEDURE — G9899 SCRN MAM PERF RSLTS DOC: HCPCS | Performed by: FAMILY MEDICINE

## 2021-06-03 PROCEDURE — G0463 HOSPITAL OUTPT CLINIC VISIT: HCPCS | Performed by: FAMILY MEDICINE

## 2021-06-03 PROCEDURE — 85610 PROTHROMBIN TIME: CPT | Performed by: FAMILY MEDICINE

## 2021-06-03 PROCEDURE — 3017F COLORECTAL CA SCREEN DOC REV: CPT | Performed by: FAMILY MEDICINE

## 2021-06-03 PROCEDURE — G9231 DOC ESRD DIA TRANS PREG: HCPCS | Performed by: FAMILY MEDICINE

## 2021-06-03 PROCEDURE — G8417 CALC BMI ABV UP PARAM F/U: HCPCS | Performed by: FAMILY MEDICINE

## 2021-06-03 PROCEDURE — G8427 DOCREV CUR MEDS BY ELIG CLIN: HCPCS | Performed by: FAMILY MEDICINE

## 2021-06-03 PROCEDURE — 99214 OFFICE O/P EST MOD 30 MIN: CPT | Performed by: FAMILY MEDICINE

## 2021-06-03 PROCEDURE — G8510 SCR DEP NEG, NO PLAN REQD: HCPCS | Performed by: FAMILY MEDICINE

## 2021-06-03 NOTE — PROGRESS NOTES
Progress Note    she is a 63y.o. year old female who presents for evalution. Subjective:     Pt here for INR check and is currently therapeutic at 2.0. Recently seen at [place] [id number] for fluid on lungs. Saw cardio earlier today and is managing this. Thinks not enough fluid coming off during dialysis. Pressure go low during dialysis tho. She wants to see about doing peritoneal dialysis that she will talk with her dialysis center/nephrologist further about this. She was unfortunately denied transplant for kidney due to being on nighttime oxygen. Reviewed PmHx, RxHx, FmHx, SocHx, AllgHx and updated and dated in the chart. Review of Systems - negative except as listed above in the HPI    Objective:     Vitals:    06/03/21 1331   BP: 117/63   Pulse: 81   Resp: 18   Temp: 98.3 °F (36.8 °C)   SpO2: 90%   Height: 5' 10\" (1.778 m)       Current Outpatient Medications   Medication Sig    mupirocin (BACTROBAN) 2 % ointment Apply  to affected area two (2) times a day.  amLODIPine (NORVASC) 10 mg tablet TAKE 1 TABLET BY MOUTH ONCE DAILY FOR HIGH BLOOD PRESSURE    allopurinoL (ZYLOPRIM) 100 mg tablet Take 1 tablet by mouth once daily    pantoprazole (PROTONIX) 40 mg tablet TAKE 1 TABLET BY MOUTH TWICE DAILY FOR HEART    predniSONE (STERAPRED DS) 10 mg dose pack See administration instruction per 10mg dose pack    isosorbide mononitrate ER (IMDUR) 120 mg CR tablet 1 tab PO qday    ergocalciferol (ERGOCALCIFEROL) 1,250 mcg (50,000 unit) capsule TAKE 1 CAPSULE BY MOUTH EVERY TUESDAY    cyclobenzaprine (FLEXERIL) 5 mg tablet Take 1 Tab by mouth two (2) times daily as needed for Muscle Spasm(s).  warfarin (COUMADIN) 2.5 mg tablet Take 1 Tab by mouth every other day.  oxyCODONE-acetaminophen (PERCOCET 7.5) 7.5-325 mg per tablet Take 1 Tab by mouth three (3) times daily for 30 days. Max Daily Amount: 3 Tabs.     carvediloL (COREG) 25 mg tablet TAKE 1 & 1/2 (ONE & ONE-HALF) TABLETS BY MOUTH TWICE DAILY WITH MEALS    warfarin (COUMADIN) 3 mg tablet Take 1 Tab by mouth every other day.  atorvastatin (LIPITOR) 80 mg tablet TAKE 1 TABLET BY MOUTH ONCE DAILY NIGHTLY    sucralfate (CARAFATE) 1 gram tablet Take 1 tablet by mouth 4 times daily    levocetirizine (XYZAL) 5 mg tablet Take 1 Tab by mouth daily.  docusate sodium (STOOL SOFTENER PO) Take  by mouth daily.  nitroglycerin (NITROSTAT) 0.4 mg SL tablet 1 Tab by SubLINGual route every five (5) minutes as needed for Chest Pain. Up to 3 doses.  naloxone (NARCAN) 4 mg/actuation nasal spray Use 1 spray intranasally, then discard. Repeat with new spray every 2 min as needed for opioid overdose symptoms, alternating nostrils.  albuterol (PROAIR HFA) 90 mcg/actuation inhaler Take 2 Puffs by inhalation every four (4) hours as needed for Wheezing.  Oxygen O2 2L NC 24/7     No current facility-administered medications for this visit. Physical Examination: General appearance - alert, well appearing, and in no distress  Chest - rhonchi noted bases b/l otherwise clear  Heart - normal rate, regular rhythm, normal S1, S2, no murmurs, rubs, clicks or gallops      Assessment/ Plan:   Diagnoses and all orders for this visit:    1. Essential hypertension  -     AMB POC PT/INR  Blood pressure controlled on medication  2. Encounter for monitoring Coumadin therapy  -     AMB POC PT/INR  At goal at 2.0 she will follow-up in roughly 4 weeks for recheck. 3. Coronary artery disease of native artery of native heart with stable angina pectoris [facility])  Being managed by cardiology     Follow-up and Dispositions    · Return in 4 weeks (on 7/1/2021), or if symptoms worsen or fail to improve. I have discussed the diagnosis with the patient and the intended plan as seen in the above orders. The patient has received an after-visit summary and questions were answered concerning future plans. Pt conveyed understanding of plan.     Medication Side Effects and Warnings were discussed with patient    An electronic signature was used to authenticate this note  Deneen Luna, DO

## 2021-06-03 NOTE — PATIENT INSTRUCTIONS
A Healthy Lifestyle: Care Instructions  Your Care Instructions     A healthy lifestyle can help you feel good, stay at a healthy weight, and have plenty of energy for both work and play. A healthy lifestyle is something you can share with your whole family. A healthy lifestyle also can lower your risk for serious health problems, such as high blood pressure, heart disease, and diabetes. You can follow a few steps listed below to improve your health and the health of your family. Follow-up care is a key part of your treatment and safety. Be sure to make and go to all appointments, and call your doctor if you are having problems. It's also a good idea to know your test results and keep a list of the medicines you take. How can you care for yourself at home? · Do not eat too much sugar, fat, or fast foods. You can still have dessert and treats now and then. The goal is moderation. · Start small to improve your eating habits. Pay attention to portion sizes, drink less juice and soda pop, and eat more fruits and vegetables. ? Eat a healthy amount of food. A 3-ounce serving of meat, for example, is about the size of a deck of cards. Fill the rest of your plate with vegetables and whole grains. ? Limit the amount of soda and sports drinks you have every day. Drink more water when you are thirsty. ? Eat plenty of fruits and vegetables every day. Have an apple or some carrot sticks as an afternoon snack instead of a candy bar. Try to have fruits and/or vegetables at every meal.  · Make exercise part of your daily routine. You may want to start with simple activities, such as walking, bicycling, or slow swimming. Try to be active 30 to 60 minutes every day. You do not need to do all 30 to 60 minutes all at once. For example, you can exercise 3 times a day for 10 or 20 minutes.  Moderate exercise is safe for most people, but it is always a good idea to talk to your doctor before starting an exercise program.  · Keep moving. Vianey Asher the lawn, work in the garden, or [employer]. Take the stairs instead of the elevator at work. · If you smoke, quit. People who smoke have an increased risk for heart attack, stroke, cancer, and other lung illnesses. Quitting is hard, but there are ways to boost your chance of quitting tobacco for good. ? Use nicotine gum, patches, or lozenges. ? Ask your doctor about stop-smoking programs and medicines. ? Keep trying. In addition to reducing your risk of diseases in the future, you will notice some benefits soon after you stop using tobacco. If you have shortness of breath or asthma symptoms, they will likely get better within a few weeks after you quit. · Limit how much alcohol you drink. Moderate amounts of alcohol (up to 2 drinks a day for men, 1 drink a day for women) are okay. But drinking too much can lead to liver problems, high blood pressure, and other health problems. Family health  If you have a family, there are many things you can do together to improve your health. · Eat meals together as a family as often as possible. · Eat healthy foods. This includes fruits, vegetables, lean meats and dairy, and whole grains. · Include your family in your fitness plan. Most people think of activities such as jogging or tennis as the way to fitness, but there are many ways you and your family can be more active. Anything that makes you breathe hard and gets your heart pumping is exercise. Here are some tips:  ? Walk to do errands or to take your child to school or the bus.  ? Go for a family bike ride after dinner instead of watching TV. Where can you learn more? Go to http://www.gray.com/  Enter W465 in the search box to learn more about \"A Healthy Lifestyle: Care Instructions. \"  Current as of: September 23, 2020               Content Version: 12.8  © 2200-6478 [employer], [employer].    Care instructions adapted under license by [employer] (which disclaims liability or warranty for this information). If you have questions about a medical condition or this instruction, always ask your healthcare professional. [address] any warranty or liability for your use of this information.

## 2021-06-03 NOTE — PROGRESS NOTES
Chief Complaint   Patient presents with    Labs     INR     Patient presents in office for INR check. Pt was recently hospitalized at [place] [address] two weeks ago for CP. States she had fluid in her heart and lungs. 1. Have you been to the ER, urgent care clinic since your last visit? Hospitalized since your last visit? No    2. Have you seen or consulted any other health care providers outside of the [address] [place] [address] since your last visit? Include any pap smears or colon screening.  No

## 2021-06-04 NOTE — H&P (VIEW-ONLY)
Subjective/HPI:  
 
Ms. Nic Pablo is a 66 y.o. female is here for new patient consultation for RCA . She has a PMHx of CAD, HFpEF, DM2, G6PD deficiency trait, GERD, HTN, JASEN, ILD with cardioMEMs implant, ESRD on dialysis MWF, and hx of DVT on coumadin therapy. She was referred by Dr. Afsaneh Clifford for consideration of  of the RCA noted on cardiac cath in 1/9/19. She has been following with Dr. Marbella Seals since 2002. She notes worsening shortness of breath with exertion with associated sharp chest pain symptoms. She complains of fatigue that has worsened over months. She notes that when she exerts herself even minimally, her heart rate will shoot up and her pulse ox will drop to the 80s. She had a cardiac cath done in 1/29/19 with Dr. Carlos Cooper who noted  of the RCA, with moderate disease of the LCx not significant by FFR. She would like to ensure her heart is in good condition as she is planning for evaluation for kidney transplant. PCP Provider Jez Stephenson DO Past Medical History:  
Diagnosis Date   Sleep Apnea 2/16/2011  Aortic aneurysm ([address]  CAD (coronary Artery Disease) Native Artery 2/16/2011  CKD (chronic kidney disease) stage 4, GFR 15-29 ml/min ([facility]) 2/10/2017  COPD (chronic obstructive pulmonary disease) ([facility])  Diabetic gastroparesis ([address]  Diastolic heart failure ([address] 10/5/2012  DM type 2 causing neurological disease ([address]  DM type 2 causing renal disease ([address]  DM type 2 causing vascular disease ([address]  Esophageal stricture 2012  
 dialted Dr. Taina Tee  G6PD deficiency (HCC)   
 trait  GERD (gastroesophageal reflux disease)  Gout  ILD (interstitial lung disease) ([address] open lung bx CJW 2010  Morbid obesity ([address]  OA (osteoarthritis)  Ovarian cancer ([address] cervical and uterine  Rheumatoid arteritis  S/P left pulmonary artery pressure sensor implant placement 8/31/2017 [facility]  Tobacco use disorder 3/21/2012  [facility]ix [facility]  Vitamin D deficiency 2013 Past Surgical History:  
Procedure Laterality Date  CARDIAC SURG PROCEDURE UNLIST    
 stents  COLONOSCOPY N/A 2016 COLONOSCOPY performed by Yue Lopez MD at [address]  HX CARPAL TUNNEL RELEASE    
 bilateral  
 HX CHOLECYSTECTOMY  HX HERNIA REPAIR    
 HX HYSTERECTOMY  HX ORTHOPAEDIC  12  
 right knee replacement  HX TONSIL AND ADENOIDECTOMY  UPPER GI ENDOSCOPY,DILATN W GUIDE  2016 Family History Problem Relation Age of Onset  Heart Disease Mother  Heart Disease Brother Social History Socioeconomic History  Marital status:  Spouse name: Not on file  Number of children: Not on file  Years of education: Not on file  Highest education level: Not on file Social Needs  Financial resource strain: Not on file  Food insecurity - worry: Not on file  Food insecurity - inability: Not on file  Transportation needs - medical: Not on file  Transportation needs - non-medical: Not on file Occupational History  Not on file Tobacco Use  Smoking status: Former Smoker Packs/day: 0.50 Years: 41.00 Pack years: 20.50 Types: Cigarettes Last attempt to quit: 2014 Years since quittin.2  Smokeless tobacco: Never Used Substance and Sexual Activity  Alcohol use: No  
 Drug use: No  
 Sexual activity: Not on file Other Topics Concern  Not on file Social History Narrative  Not on file Allergies Allergen Reactions  Contrast Dye [Iodine] Anaphylaxis Tolerates when pre medicated w/ IV solumedrol and benadryl prior to procedure  Levaquin [Levofloxacin] Nausea and Vomiting  Morphine Hives and Itching Current Outpatient Medications Medication Sig  
  warfarin (COUMADIN) 3 mg tablet TAKE 1 TABLET BY MOUTH EVERY DAY  clopidogrel (PLAVIX) 300 mg tab tab Take 2 Tabs by mouth once for 1 dose.  predniSONE (DELTASONE) 50 mg tablet Take 1 Tab by mouth daily (with breakfast) for 2 days.  diclofenac (VOLTAREN) 1 % gel Apply 2 g to affected area four (4) times daily. As needed for right knee pain  oxyCODONE-acetaminophen (PERCOCET) 7.5-325 mg per tablet Take 1 Tab by mouth two (2) times daily as needed for Pain. Max Daily Amount: 2 Tabs.  insulin aspart protamine/insulin aspart (NOVOLOG MIX 70-30 U-100 INSULN) 100 unit/mL (70-30) injection 10-40 Units by SubCUTAneous route nightly as needed (BG > 160).  amLODIPine (NORVASC) 5 mg tablet Take 5 mg by mouth daily.  isosorbide mononitrate ER (IMDUR) 120 mg CR tablet TAKE 1 TABLET BY MOUTH ONCE DAILY  allopurinol (ZYLOPRIM) 100 mg tablet TAKE 1 TABLET BY MOUTH EVERY DAY  nitroglycerin (NITROSTAT) 0.3 mg SL tablet 1 Tab by SubLINGual route every five (5) minutes as needed for Chest Pain.  carvedilol (COREG) 25 mg tablet Take 1.5 Tabs by mouth two (2) times daily (with meals).  pantoprazole (PROTONIX) 40 mg tablet TAKE 1 TABLET BY MOUTH EVERY DAY FOR HEARTBURN  
 albuterol (PROAIR HFA) 90 mcg/actuation inhaler Take 2 Puffs by inhalation every four (4) hours as needed for Wheezing.  ergocalciferol (VITAMIN D2) 50,000 unit capsule Take 50,000 Units by mouth every Tuesday.  aspirin 81 mg tablet Take 81 mg by mouth daily.  atorvastatin (LIPITOR) 80 mg tablet Take 80 mg by mouth nightly. No current facility-administered medications for this visit. I have reviewed the problem list, allergy list, medical history, family, social history and medications. Review of Symptoms: 
 
Review of Systems Constitutional: Negative for chills, fever and weight loss. HENT: Negative for nosebleeds. Eyes: Negative for blurred vision and double vision. Respiratory: Negative for cough, shortness of breath and wheezing. Cardiovascular: Negative for chest pain, palpitations, orthopnea, leg swelling and PND. Gastrointestinal: Negative for abdominal pain, blood in stool, diarrhea, nausea and vomiting. Musculoskeletal: Negative for joint pain. Skin: Negative for rash. Neurological: Negative for dizziness, tingling and loss of consciousness. Endo/Heme/Allergies: Does not bruise/bleed easily. Physical Exam:   
 
General: Well developed, in no acute distress, cooperative and alert HEENT: No carotid bruits, no JVD, trach is midline. Neck Supple, PEERL, EOM intact. Heart:  reg rate and rhythm; normal S1/S2; no murmurs, gallops or rubs.  
Respiratory: Clear bilaterally x 4, no wheezing or rales. On continuous supplemental O2 via 2L NC Abdomen:   Soft, non-tender, no distention, no masses. + BS.  
Extremities:  Normal cap refill, no cyanosis, atraumatic. No edema. Left forearm AV fistula + thrill Neuro: A&Ox3, speech clear, gait stable. Skin: Skin color is normal. No rashes or lesions. Non diaphoretic Vascular: 2+ pulses symmetric in all extremities Vitals:  
 02/13/19 1800 BP: 118/58 Pulse: 83 Resp: 18 SpO2: (!) 87% Weight: 303 lb 8 oz (137.7 kg) Height: 5' 10\" (1.778 m) Cardiographics ECG: sinus rhythm Results for orders placed or performed during the hospital encounter of 05/30/18 EKG, 12 LEAD, INITIAL Result Value Ref Range Ventricular Rate 71 BPM  
 Atrial Rate 71 BPM  
 P-R Interval 192 ms QRS Duration 76 ms  
 Q-T Interval 410 ms QTC Calculation (Bezet) 445 ms Calculated P Axis 51 degrees Calculated R Axis 12 degrees Calculated T Axis 50 degrees Diagnosis Normal sinus rhythm Normal ECG Confirmed by Nury Vargas MD., Bertram Gatica ([id number]) on 6/8/2018 2:32:37 PM 
  
 
 
Cardiology Labs: 
Lab Results Component Value Date/Time  Cholesterol, total 176 11/09/2014 03:30 AM  
 HDL Cholesterol 64 11/09/2014 03:30 AM  
 LDL, calculated 92.6 11/09/2014 03:30 AM  
 Triglyceride 97 11/09/2014 03:30 AM  
 CHOL/HDL Ratio 2.8 11/09/2014 03:30 AM  
 
 
Lab Results Component Value Date/Time Sodium 141 01/18/2019 12:58 PM  
 Potassium 4.4 01/18/2019 12:58 PM  
 Chloride 101 01/18/2019 12:58 PM  
 CO2 29 01/18/2019 12:58 PM  
 Anion gap 11 01/18/2019 12:58 PM  
 Glucose 111 (H) 01/18/2019 12:58 PM  
 BUN 55 (H) 01/18/2019 12:58 PM  
 Creatinine 9.61 (H) 01/18/2019 12:58 PM  
 BUN/Creatinine ratio 6 (L) 01/18/2019 12:58 PM  
 GFR est AA 5 (L) 01/18/2019 12:58 PM  
 GFR est non-AA 4 (L) 01/18/2019 12:58 PM  
 Calcium 8.6 01/18/2019 12:58 PM  
 Bilirubin, total 0.5 06/26/2018 04:10 PM  
 AST (SGOT) 17 06/26/2018 04:10 PM  
 Alk. phosphatase 120 (H) 06/26/2018 04:10 PM  
 Protein, total 7.5 06/26/2018 04:10 PM  
 Albumin 3.4 (L) 06/26/2018 04:10 PM  
 Globulin 4.1 (H) 06/26/2018 04:10 PM  
 A-G Ratio 0.8 (L) 06/26/2018 04:10 PM  
 ALT (SGPT) 14 06/26/2018 04:10 PM  
  
 
 
 Assessment: 
 
 Assessment: ICD-10-CM ICD-9-CM 1. Atherosclerosis of native coronary artery of native heart, angina presence unspecified I25.10 414.01 AMB POC EKG ROUTINE W/ 12 LEADS, INTER & REP  
   clopidogrel (PLAVIX) 300 mg tab tab METABOLIC PANEL, COMPREHENSIVE  
   CBC W/O DIFF PROTHROMBIN TIME + INR  
   CASE REQUEST CATH LAB  
   predniSONE (DELTASONE) 50 mg tablet 2. (HFpEF) heart failure with preserved ejection fraction (HCC) I50.30 428.9 3. Essential hypertension I10 401.9 Plan: 1. Atherosclerosis of native coronary artery of native heart, angina presence unspecified Cath in 1/29/19 with  of the RCA; will arrange for cardiac cath with plans to intervene on RCA . Plan to ultrasound right radial artery, but may ultimately require bifemoral approach. Hold Coumadin until procedure.   Load with 600 mg Plavix the day BEFORE the procedure. Will also provide 50 mg Prednisone to take evening before procedure and 50 mg morning of procedure, along with 25 mg Benadryl morning of procedure given iodine allergy. Continue ASA. Will try and arrange the procedure on the day before her scheduled dialysis session.   
- AMB POC EKG ROUTINE W/ 12 LEADS, INTER & REP 
 
2. (HFpEF) heart failure with preserved ejection fraction ([address] Echo in 12/2017 with preserved LVEF 60%. Appears euvolemic on exam today; continue with dialysis sessions as scheduled for fluid management. 3. Essential hypertension BP controlled. Continue anti-hypertensive therapy. CHRISTIANO Leon-BC Patient seen and examined by me with nurse practitioner. Rosenda Acosta personally performed all components of the history, physical, and medical decision making and agree with the assessment and plan with minor modifications as noted. FC III angina with  RCA on multiple antianginal meds. Plan  PCI for symptom relief. Films reviewed personally. Short  without prox cap ambiguity.   Prob bifemoral. 
 
 
Slime Scott MD 
 
 
 Breath sounds clear and equal bilaterally.

## 2021-07-08 ENCOUNTER — OFFICE VISIT (OUTPATIENT)
Dept: FAMILY MEDICINE CLINIC | Age: 66
End: 2021-07-08
Payer: MEDICARE

## 2021-07-08 ENCOUNTER — TELEPHONE (OUTPATIENT)
Dept: FAMILY MEDICINE CLINIC | Age: 66
End: 2021-07-08

## 2021-07-08 VITALS
WEIGHT: 285.27 LBS | OXYGEN SATURATION: 98 % | RESPIRATION RATE: 16 BRPM | BODY MASS INDEX: 40.84 KG/M2 | HEIGHT: 70 IN | DIASTOLIC BLOOD PRESSURE: 63 MMHG | HEART RATE: 68 BPM | TEMPERATURE: 98.1 F | SYSTOLIC BLOOD PRESSURE: 101 MMHG

## 2021-07-08 DIAGNOSIS — G89.29 CHRONIC BILATERAL LOW BACK PAIN, UNSPECIFIED WHETHER SCIATICA PRESENT: ICD-10-CM

## 2021-07-08 DIAGNOSIS — Z79.01 ENCOUNTER FOR MONITORING COUMADIN THERAPY: Primary | ICD-10-CM

## 2021-07-08 DIAGNOSIS — M54.50 CHRONIC BILATERAL LOW BACK PAIN, UNSPECIFIED WHETHER SCIATICA PRESENT: ICD-10-CM

## 2021-07-08 DIAGNOSIS — Z86.718 HX OF DEEP VENOUS THROMBOSIS: ICD-10-CM

## 2021-07-08 DIAGNOSIS — G89.29 CHRONIC CHEST PAIN: ICD-10-CM

## 2021-07-08 DIAGNOSIS — Z51.81 ENCOUNTER FOR MONITORING COUMADIN THERAPY: Primary | ICD-10-CM

## 2021-07-08 DIAGNOSIS — M25.512 LEFT SHOULDER PAIN, UNSPECIFIED CHRONICITY: ICD-10-CM

## 2021-07-08 DIAGNOSIS — R07.9 CHRONIC CHEST PAIN: ICD-10-CM

## 2021-07-08 DIAGNOSIS — J84.9 ILD (INTERSTITIAL LUNG DISEASE) (HCC): ICD-10-CM

## 2021-07-08 LAB
INR BLD: 1.3
PT POC: 16.1 SECONDS
VALID INTERNAL CONTROL?: YES

## 2021-07-08 PROCEDURE — 99214 OFFICE O/P EST MOD 30 MIN: CPT | Performed by: FAMILY MEDICINE

## 2021-07-08 PROCEDURE — G9231 DOC ESRD DIA TRANS PREG: HCPCS | Performed by: FAMILY MEDICINE

## 2021-07-08 PROCEDURE — 3017F COLORECTAL CA SCREEN DOC REV: CPT | Performed by: FAMILY MEDICINE

## 2021-07-08 PROCEDURE — 85610 PROTHROMBIN TIME: CPT | Performed by: FAMILY MEDICINE

## 2021-07-08 PROCEDURE — G8417 CALC BMI ABV UP PARAM F/U: HCPCS | Performed by: FAMILY MEDICINE

## 2021-07-08 PROCEDURE — G0463 HOSPITAL OUTPT CLINIC VISIT: HCPCS | Performed by: FAMILY MEDICINE

## 2021-07-08 PROCEDURE — G8427 DOCREV CUR MEDS BY ELIG CLIN: HCPCS | Performed by: FAMILY MEDICINE

## 2021-07-08 PROCEDURE — G9899 SCRN MAM PERF RSLTS DOC: HCPCS | Performed by: FAMILY MEDICINE

## 2021-07-08 PROCEDURE — G8510 SCR DEP NEG, NO PLAN REQD: HCPCS | Performed by: FAMILY MEDICINE

## 2021-07-08 RX ORDER — OXYCODONE AND ACETAMINOPHEN 10; 325 MG/1; MG/1
1 TABLET ORAL
Qty: 90 TABLET | Refills: 0 | Status: SHIPPED | OUTPATIENT
Start: 2021-07-08 | End: 2021-07-11

## 2021-07-08 NOTE — PROGRESS NOTES
Chief Complaint   Patient presents with    Follow-up     INR     Patient presents in office today for INR check. [facility] Was given Flexeril. States that it didn't help with the pain. Needs a refill on her Percocet but would like to see if she can get the dose increased. No other concerns. 1. Have you been to the ER, urgent care clinic since your last visit? Hospitalized since your last visit? No    2. Have you seen or consulted any other health care providers outside of the [address] [place] [address] since your last visit? Include any pap smears or colon screening.  No    Learning Assessment 6/28/2019   PRIMARY LEARNER Patient   PRIMARY LANGUAGE ENGLISH   LEARNER PREFERENCE PRIMARY LISTENING   ANSWERED BY patient    RELATIONSHIP SELF

## 2021-07-08 NOTE — PROGRESS NOTES
Progress Note    she is a 63y.o. year old female who presents for evalution. Subjective:     Pt here for INR check and is low today currently at 1.3 should be 2-3. Would like to go on eliquis, has secondary, esthela lsend in. Pt is having increased shoulder pain, seeing ortho next week regarding this. She has chronic chest pain and is on percocet 7.5, feels this dose is not managing hr pain well enough. She is requesting a dose increase does not want to go up on pill count. Currently no issues with sedation. Will increase to 10mg q8. Reviewed PmHx, RxHx, FmHx, SocHx, AllgHx and updated and dated in the chart. Review of Systems - negative except as listed above in the HPI    Objective:     Vitals:    07/08/21 1314   BP: 101/63   Pulse: 68   Resp: 16   Temp: 98.1 °F (36.7 °C)   TempSrc: Oral   SpO2: 98%   Weight: 285 lb 4.4 oz (129.4 kg)   Height: 5' 10\" (1.778 m)       Current Outpatient Medications   Medication Sig    oxyCODONE-acetaminophen (PERCOCET 10)  mg per tablet Take 1 Tablet by mouth every eight (8) hours as needed for Pain for up to 3 days. Max Daily Amount: 3 Tablets.  apixaban (ELIQUIS) 2.5 mg tablet Take 1 Tablet by mouth two (2) times a day.  mupirocin (BACTROBAN) 2 % ointment Apply  to affected area two (2) times a day.  amLODIPine (NORVASC) 10 mg tablet TAKE 1 TABLET BY MOUTH ONCE DAILY FOR HIGH BLOOD PRESSURE    allopurinoL (ZYLOPRIM) 100 mg tablet Take 1 tablet by mouth once daily    pantoprazole (PROTONIX) 40 mg tablet TAKE 1 TABLET BY MOUTH TWICE DAILY FOR HEART    isosorbide mononitrate ER (IMDUR) 120 mg CR tablet 1 tab PO qday    ergocalciferol (ERGOCALCIFEROL) 1,250 mcg (50,000 unit) capsule TAKE 1 CAPSULE BY MOUTH EVERY TUESDAY    cyclobenzaprine (FLEXERIL) 5 mg tablet Take 1 Tab by mouth two (2) times daily as needed for Muscle Spasm(s).  warfarin (COUMADIN) 2.5 mg tablet Take 1 Tab by mouth every other day.     carvediloL (COREG) 25 mg tablet TAKE 1 & 1/2 (ONE & ONE-HALF) TABLETS BY MOUTH TWICE DAILY WITH MEALS    warfarin (COUMADIN) 3 mg tablet Take 1 Tab by mouth every other day.  atorvastatin (LIPITOR) 80 mg tablet TAKE 1 TABLET BY MOUTH ONCE DAILY NIGHTLY    sucralfate (CARAFATE) 1 gram tablet Take 1 tablet by mouth 4 times daily    docusate sodium (STOOL SOFTENER PO) Take  by mouth daily.  nitroglycerin (NITROSTAT) 0.4 mg SL tablet 1 Tab by SubLINGual route every five (5) minutes as needed for Chest Pain. Up to 3 doses.  naloxone (NARCAN) 4 mg/actuation nasal spray Use 1 spray intranasally, then discard. Repeat with new spray every 2 min as needed for opioid overdose symptoms, alternating nostrils.  albuterol (PROAIR HFA) 90 mcg/actuation inhaler Take 2 Puffs by inhalation every four (4) hours as needed for Wheezing.  Oxygen O2 2L NC 24/7    predniSONE (STERAPRED DS) 10 mg dose pack See administration instruction per 10mg dose pack (Patient not taking: Reported on 7/8/2021)    levocetirizine (XYZAL) 5 mg tablet Take 1 Tab by mouth daily. (Patient not taking: Reported on 7/8/2021)     No current facility-administered medications for this visit. Physical Examination: General appearance - alert, well appearing, and in no distress  Chest - clear to auscultation, no wheezes, rales or rhonchi, symmetric air entry  Heart - normal rate, regular rhythm, normal S1, S2, no murmurs, rubs, clicks or gallops      Assessment/ Plan:   Diagnoses and all orders for this visit:    1. Encounter for monitoring Coumadin therapy  -     AMB POC PT/INR  Is able to afford Eliquis stop Coumadin and start Eliquis 2.5 twice daily  2. Hx of deep venous thrombosis  -     apixaban (ELIQUIS) 2.5 mg tablet; Take 1 Tablet by mouth two (2) times a day. 3. Chronic bilateral low back pain, unspecified whether sciatica present  -     oxyCODONE-acetaminophen (PERCOCET 10)  mg per tablet;  Take 1 Tablet by mouth every eight (8) hours as needed for Pain for up to 3 days. Max Daily Amount: 3 Tablets. 4. Chronic chest pain  -     oxyCODONE-acetaminophen (PERCOCET 10)  mg per tablet; Take 1 Tablet by mouth every eight (8) hours as needed for Pain for up to 3 days. Max Daily Amount: 3 Tablets. 5. ILD (interstitial lung disease) [facility]  -     oxyCODONE-acetaminophen (PERCOCET 10)  mg per tablet; Take 1 Tablet by mouth every eight (8) hours as needed for Pain for up to 3 days. Max Daily Amount: 3 Tablets. 6. Left shoulder pain, unspecified chronicity  Is seeing orthopedics next week. Follow-up and Dispositions    · Return in 4 weeks (on 8/5/2021), or if symptoms worsen or fail to improve. I have discussed the diagnosis with the patient and the intended plan as seen in the above orders. The patient has received an after-visit summary and questions were answered concerning future plans. Pt conveyed understanding of plan.     Medication Side Effects and Warnings were discussed with patient    An electronic signature was used to authenticate this note  Angie Anderson DO

## 2021-07-08 NOTE — PATIENT INSTRUCTIONS
A Healthy Lifestyle: Care Instructions  Your Care Instructions     A healthy lifestyle can help you feel good, stay at a healthy weight, and have plenty of energy for both work and play. A healthy lifestyle is something you can share with your whole family. A healthy lifestyle also can lower your risk for serious health problems, such as high blood pressure, heart disease, and diabetes. You can follow a few steps listed below to improve your health and the health of your family. Follow-up care is a key part of your treatment and safety. Be sure to make and go to all appointments, and call your doctor if you are having problems. It's also a good idea to know your test results and keep a list of the medicines you take. How can you care for yourself at home? · Do not eat too much sugar, fat, or fast foods. You can still have dessert and treats now and then. The goal is moderation. · Start small to improve your eating habits. Pay attention to portion sizes, drink less juice and soda pop, and eat more fruits and vegetables. ? Eat a healthy amount of food. A 3-ounce serving of meat, for example, is about the size of a deck of cards. Fill the rest of your plate with vegetables and whole grains. ? Limit the amount of soda and sports drinks you have every day. Drink more water when you are thirsty. ? Eat plenty of fruits and vegetables every day. Have an apple or some carrot sticks as an afternoon snack instead of a candy bar. Try to have fruits and/or vegetables at every meal.  · Make exercise part of your daily routine. You may want to start with simple activities, such as walking, bicycling, or slow swimming. Try to be active 30 to 60 minutes every day. You do not need to do all 30 to 60 minutes all at once. For example, you can exercise 3 times a day for 10 or 20 minutes.  Moderate exercise is safe for most people, but it is always a good idea to talk to your doctor before starting an exercise program.  · Keep moving. Horacio Cho the lawn, work in the garden, or [email or web address]. Take the stairs instead of the elevator at work. · If you smoke, quit. People who smoke have an increased risk for heart attack, stroke, cancer, and other lung illnesses. Quitting is hard, but there are ways to boost your chance of quitting tobacco for good. ? Use nicotine gum, patches, or lozenges. ? Ask your doctor about stop-smoking programs and medicines. ? Keep trying. In addition to reducing your risk of diseases in the future, you will notice some benefits soon after you stop using tobacco. If you have shortness of breath or asthma symptoms, they will likely get better within a few weeks after you quit. · Limit how much alcohol you drink. Moderate amounts of alcohol (up to 2 drinks a day for men, 1 drink a day for women) are okay. But drinking too much can lead to liver problems, high blood pressure, and other health problems. Family health  If you have a family, there are many things you can do together to improve your health. · Eat meals together as a family as often as possible. · Eat healthy foods. This includes fruits, vegetables, lean meats and dairy, and whole grains. · Include your family in your fitness plan. Most people think of activities such as jogging or tennis as the way to fitness, but there are many ways you and your family can be more active. Anything that makes you breathe hard and gets your heart pumping is exercise. Here are some tips:  ? Walk to do errands or to take your child to school or the bus.  ? Go for a family bike ride after dinner instead of watching TV. Where can you learn more? Go to http://www.gray.com/  Enter O681 in the search box to learn more about \"A Healthy Lifestyle: Care Instructions. \"  Current as of: September 23, 2020               Content Version: 12.8  © 4540-8731 Healthwise, Incorporated.    Care instructions adapted under license by [employer] (which disclaims liability or warranty for this information). If you have questions about a medical condition or this instruction, always ask your healthcare professional. [address] any warranty or liability for your use of this information.

## 2021-07-26 RX ORDER — ERGOCALCIFEROL 1.25 MG/1
CAPSULE ORAL
Qty: 12 CAPSULE | Refills: 0 | Status: SHIPPED | OUTPATIENT
Start: 2021-07-26 | End: 2022-05-28

## 2021-08-12 ENCOUNTER — OFFICE VISIT (OUTPATIENT)
Dept: FAMILY MEDICINE CLINIC | Age: 66
End: 2021-08-12
Payer: MEDICARE

## 2021-08-12 ENCOUNTER — TELEPHONE (OUTPATIENT)
Dept: FAMILY MEDICINE CLINIC | Age: 66
End: 2021-08-12

## 2021-08-12 VITALS
HEART RATE: 77 BPM | DIASTOLIC BLOOD PRESSURE: 73 MMHG | RESPIRATION RATE: 16 BRPM | SYSTOLIC BLOOD PRESSURE: 110 MMHG | TEMPERATURE: 98 F | HEIGHT: 70 IN | OXYGEN SATURATION: 96 % | WEIGHT: 285.06 LBS | BODY MASS INDEX: 40.81 KG/M2

## 2021-08-12 DIAGNOSIS — M54.50 CHRONIC BILATERAL LOW BACK PAIN, UNSPECIFIED WHETHER SCIATICA PRESENT: ICD-10-CM

## 2021-08-12 DIAGNOSIS — M25.561 CHRONIC PAIN OF RIGHT KNEE: ICD-10-CM

## 2021-08-12 DIAGNOSIS — Z79.01 ENCOUNTER FOR MONITORING COUMADIN THERAPY: Primary | ICD-10-CM

## 2021-08-12 DIAGNOSIS — Z51.81 ENCOUNTER FOR MONITORING COUMADIN THERAPY: Primary | ICD-10-CM

## 2021-08-12 DIAGNOSIS — G89.29 CHRONIC CHEST PAIN: ICD-10-CM

## 2021-08-12 DIAGNOSIS — E66.01 OBESITY, CLASS III, BMI 40-49.9 (MORBID OBESITY) (HCC): ICD-10-CM

## 2021-08-12 DIAGNOSIS — R07.9 CHRONIC CHEST PAIN: ICD-10-CM

## 2021-08-12 DIAGNOSIS — M62.838 NECK MUSCLE SPASM: ICD-10-CM

## 2021-08-12 DIAGNOSIS — G89.29 CHRONIC PAIN OF RIGHT KNEE: ICD-10-CM

## 2021-08-12 DIAGNOSIS — G89.29 CHRONIC BILATERAL LOW BACK PAIN, UNSPECIFIED WHETHER SCIATICA PRESENT: ICD-10-CM

## 2021-08-12 LAB
INR BLD: 3
PT POC: 35.6 SECONDS
VALID INTERNAL CONTROL?: YES

## 2021-08-12 PROCEDURE — G8417 CALC BMI ABV UP PARAM F/U: HCPCS | Performed by: FAMILY MEDICINE

## 2021-08-12 PROCEDURE — 85610 PROTHROMBIN TIME: CPT | Performed by: FAMILY MEDICINE

## 2021-08-12 PROCEDURE — G9899 SCRN MAM PERF RSLTS DOC: HCPCS | Performed by: FAMILY MEDICINE

## 2021-08-12 PROCEDURE — 3017F COLORECTAL CA SCREEN DOC REV: CPT | Performed by: FAMILY MEDICINE

## 2021-08-12 PROCEDURE — G0463 HOSPITAL OUTPT CLINIC VISIT: HCPCS | Performed by: FAMILY MEDICINE

## 2021-08-12 PROCEDURE — G8510 SCR DEP NEG, NO PLAN REQD: HCPCS | Performed by: FAMILY MEDICINE

## 2021-08-12 PROCEDURE — 99214 OFFICE O/P EST MOD 30 MIN: CPT | Performed by: FAMILY MEDICINE

## 2021-08-12 PROCEDURE — G8427 DOCREV CUR MEDS BY ELIG CLIN: HCPCS | Performed by: FAMILY MEDICINE

## 2021-08-12 PROCEDURE — G9231 DOC ESRD DIA TRANS PREG: HCPCS | Performed by: FAMILY MEDICINE

## 2021-08-12 RX ORDER — CYCLOBENZAPRINE HCL 5 MG
5 TABLET ORAL
Qty: 90 TABLET | Refills: 4 | Status: SHIPPED | OUTPATIENT
Start: 2021-08-12 | End: 2022-05-28

## 2021-08-12 RX ORDER — OXYCODONE AND ACETAMINOPHEN 10; 325 MG/1; MG/1
1 TABLET ORAL
Qty: 90 TABLET | Refills: 0 | Status: SHIPPED | OUTPATIENT
Start: 2021-08-12 | End: 2021-12-09 | Stop reason: SDUPTHER

## 2021-08-12 RX ORDER — OXYCODONE AND ACETAMINOPHEN 10; 325 MG/1; MG/1
1 TABLET ORAL
Qty: 90 TABLET | Refills: 0 | Status: SHIPPED | OUTPATIENT
Start: 2021-10-11 | End: 2021-12-09 | Stop reason: SDUPTHER

## 2021-08-12 RX ORDER — OXYCODONE AND ACETAMINOPHEN 10; 325 MG/1; MG/1
1 TABLET ORAL
Qty: 90 TABLET | Refills: 0 | Status: SHIPPED | OUTPATIENT
Start: 2021-09-11 | End: 2021-12-09 | Stop reason: SDUPTHER

## 2021-08-12 NOTE — PATIENT INSTRUCTIONS
A Healthy Lifestyle: Care Instructions  Your Care Instructions     A healthy lifestyle can help you feel good, stay at a healthy weight, and have plenty of energy for both work and play. A healthy lifestyle is something you can share with your whole family. A healthy lifestyle also can lower your risk for serious health problems, such as high blood pressure, heart disease, and diabetes. You can follow a few steps listed below to improve your health and the health of your family. Follow-up care is a key part of your treatment and safety. Be sure to make and go to all appointments, and call your doctor if you are having problems. It's also a good idea to know your test results and keep a list of the medicines you take. How can you care for yourself at home? · Do not eat too much sugar, fat, or fast foods. You can still have dessert and treats now and then. The goal is moderation. · Start small to improve your eating habits. Pay attention to portion sizes, drink less juice and soda pop, and eat more fruits and vegetables. ? Eat a healthy amount of food. A 3-ounce serving of meat, for example, is about the size of a deck of cards. Fill the rest of your plate with vegetables and whole grains. ? Limit the amount of soda and sports drinks you have every day. Drink more water when you are thirsty. ? Eat plenty of fruits and vegetables every day. Have an apple or some carrot sticks as an afternoon snack instead of a candy bar. Try to have fruits and/or vegetables at every meal.  · Make exercise part of your daily routine. You may want to start with simple activities, such as walking, bicycling, or slow swimming. Try to be active 30 to 60 minutes every day. You do not need to do all 30 to 60 minutes all at once. For example, you can exercise 3 times a day for 10 or 20 minutes.  Moderate exercise is safe for most people, but it is always a good idea to talk to your doctor before starting an exercise program.  · Keep moving. [employer] the lawn, work in the garden, or [employer]. Take the stairs instead of the elevator at work. · If you smoke, quit. People who smoke have an increased risk for heart attack, stroke, cancer, and other lung illnesses. Quitting is hard, but there are ways to boost your chance of quitting tobacco for good. ? Use nicotine gum, patches, or lozenges. ? Ask your doctor about stop-smoking programs and medicines. ? Keep trying. In addition to reducing your risk of diseases in the future, you will notice some benefits soon after you stop using tobacco. If you have shortness of breath or asthma symptoms, they will likely get better within a few weeks after you quit. · Limit how much alcohol you drink. Moderate amounts of alcohol (up to 2 drinks a day for men, 1 drink a day for women) are okay. But drinking too much can lead to liver problems, high blood pressure, and other health problems. Family health  If you have a family, there are many things you can do together to improve your health. · Eat meals together as a family as often as possible. · Eat healthy foods. This includes fruits, vegetables, lean meats and dairy, and whole grains. · Include your family in your fitness plan. Most people think of activities such as jogging or tennis as the way to fitness, but there are many ways you and your family can be more active. Anything that makes you breathe hard and gets your heart pumping is exercise. Here are some tips:  ? Walk to do errands or to take your child to school or the bus.  ? Go for a family bike ride after dinner instead of watching TV. Where can you learn more? Go to http://www.gray.com/  Enter V338 in the search box to learn more about \"A Healthy Lifestyle: Care Instructions. \"  Current as of: September 23, 2020               Content Version: 12.8  © 3779-3175 Healthwise, Incorporated.    Care instructions adapted under license by [employer] (which disclaims liability or warranty for this information). If you have questions about a medical condition or this instruction, always ask your healthcare professional. [address] any warranty or liability for your use of this information.

## 2021-08-12 NOTE — PROGRESS NOTES
Progress Note    she is a 63y.o. year old female who presents for evalution. Subjective:     Pt here for INR check. Was not able to afford the eliquis. Currently therapeutic at 3.0      Currently taking 2.5 Sun Tues Thurs Sat, 3 all other days. Dose adjustment in orders    We change her pain medication to Percocet 10/325. She states this is working better make the pain more tolerable. Patient is chronic chest pain from pulmonary embolisms and coronary artery disease. Chronic knee pain she is going to require revision of her right knee since the replacement has failed. Chronic left shoulder pain as well. Chronic low back pain chronic neck pain. She does also use Flexeril which helps she takes at night. Uses Voltaren gel as needed which provides minimal relief. No history of abuse or misuse of medication. Will bring her pain to about a 3 or 4 from an 8 out of 10. Helps with quality life. Activities limited due to failed right knee replacement however she does remain as active as she can. Opioid/Pain Management:    1. Has the patient signed a pain contract for chronic narcotic use? yes  2. Has the patient had a UDS or Serum screen as per guidelines as per [facility]?:   yes    3. Does patient meet necessary guidelines for Naloxone treatement per the [facility]?:    no  4. Has the Prescription Monitoring Program been reviewed? yes  5. Does patient have a long term condition that requires long term use of a Narcotic?  yes  6. Has patient been tolerant of therapy and responsible to routine follow up and specialist follow-up? Yes        Reviewed PmHx, RxHx, FmHx, SocHx, AllgHx and updated and dated in the chart.     Review of Systems - negative except as listed above in the HPI    Objective:     Vitals:    08/12/21 1317   BP: 110/73   Pulse: 77   Resp: 16   Temp: 98 °F (36.7 °C)   TempSrc: Oral   SpO2: 96%   Weight: 285 lb 0.9 oz (129.3 kg)   Height: 5' 10\" (1.778 m) Current Outpatient Medications   Medication Sig    [START ON 10/11/2021] oxyCODONE-acetaminophen (PERCOCET 10)  mg per tablet Take 1 Tablet by mouth every eight (8) hours as needed for Pain for up to 30 days. Max Daily Amount: 3 Tablets.  [START ON 9/11/2021] oxyCODONE-acetaminophen (PERCOCET 10)  mg per tablet Take 1 Tablet by mouth every eight (8) hours as needed for Pain for up to 30 days. Max Daily Amount: 3 Tablets.  oxyCODONE-acetaminophen (PERCOCET 10)  mg per tablet Take 1 Tablet by mouth every eight (8) hours as needed for Pain for up to 30 days. Max Daily Amount: 3 Tablets.  cyclobenzaprine (FLEXERIL) 5 mg tablet Take 1 Tablet by mouth nightly as needed for Muscle Spasm(s).  ergocalciferol (ERGOCALCIFEROL) 1,250 mcg (50,000 unit) capsule TAKE 1 CAPSULE BY MOUTH EVERY TUESDAY    mupirocin (BACTROBAN) 2 % ointment Apply  to affected area two (2) times a day.  amLODIPine (NORVASC) 10 mg tablet TAKE 1 TABLET BY MOUTH ONCE DAILY FOR HIGH BLOOD PRESSURE    allopurinoL (ZYLOPRIM) 100 mg tablet Take 1 tablet by mouth once daily    pantoprazole (PROTONIX) 40 mg tablet TAKE 1 TABLET BY MOUTH TWICE DAILY FOR HEART    isosorbide mononitrate ER (IMDUR) 120 mg CR tablet 1 tab PO qday    warfarin (COUMADIN) 2.5 mg tablet Take 1 Tab by mouth every other day.  carvediloL (COREG) 25 mg tablet TAKE 1 & 1/2 (ONE & ONE-HALF) TABLETS BY MOUTH TWICE DAILY WITH MEALS    warfarin (COUMADIN) 3 mg tablet Take 1 Tab by mouth every other day.  atorvastatin (LIPITOR) 80 mg tablet TAKE 1 TABLET BY MOUTH ONCE DAILY NIGHTLY    sucralfate (CARAFATE) 1 gram tablet Take 1 tablet by mouth 4 times daily    docusate sodium (STOOL SOFTENER PO) Take  by mouth daily.  nitroglycerin (NITROSTAT) 0.4 mg SL tablet 1 Tab by SubLINGual route every five (5) minutes as needed for Chest Pain. Up to 3 doses.  naloxone (NARCAN) 4 mg/actuation nasal spray Use 1 spray intranasally, then discard. Repeat with new spray every 2 min as needed for opioid overdose symptoms, alternating nostrils.  albuterol (PROAIR HFA) 90 mcg/actuation inhaler Take 2 Puffs by inhalation every four (4) hours as needed for Wheezing.  Oxygen O2 2L NC 24/7    predniSONE (STERAPRED DS) 10 mg dose pack See administration instruction per 10mg dose pack (Patient not taking: Reported on 7/8/2021)    levocetirizine (XYZAL) 5 mg tablet Take 1 Tab by mouth daily. (Patient not taking: Reported on 8/12/2021)     No current facility-administered medications for this visit. Physical Examination: General appearance - alert, well appearing, and in no distress      Assessment/ Plan:   Diagnoses and all orders for this visit:    1. Encounter for monitoring Coumadin therapy  -     AMB POC PT/INR   2.5 Manley Peabody Friday Sat, 3 MW  2. Chronic chest pain  -     oxyCODONE-acetaminophen (PERCOCET 10)  mg per tablet; Take 1 Tablet by mouth every eight (8) hours as needed for Pain for up to 30 days. Max Daily Amount: 3 Tablets. -     oxyCODONE-acetaminophen (PERCOCET 10)  mg per tablet; Take 1 Tablet by mouth every eight (8) hours as needed for Pain for up to 30 days. Max Daily Amount: 3 Tablets. -     oxyCODONE-acetaminophen (PERCOCET 10)  mg per tablet; Take 1 Tablet by mouth every eight (8) hours as needed for Pain for up to 30 days. Max Daily Amount: 3 Tablets. 3. Chronic bilateral low back pain, unspecified whether sciatica present  -     oxyCODONE-acetaminophen (PERCOCET 10)  mg per tablet; Take 1 Tablet by mouth every eight (8) hours as needed for Pain for up to 30 days. Max Daily Amount: 3 Tablets. -     oxyCODONE-acetaminophen (PERCOCET 10)  mg per tablet; Take 1 Tablet by mouth every eight (8) hours as needed for Pain for up to 30 days. Max Daily Amount: 3 Tablets. -     oxyCODONE-acetaminophen (PERCOCET 10)  mg per tablet;  Take 1 Tablet by mouth every eight (8) hours as needed for Pain for up to 30 days. Max Daily Amount: 3 Tablets. 4. Chronic pain of right knee  -     oxyCODONE-acetaminophen (PERCOCET 10)  mg per tablet; Take 1 Tablet by mouth every eight (8) hours as needed for Pain for up to 30 days. Max Daily Amount: 3 Tablets. -     oxyCODONE-acetaminophen (PERCOCET 10)  mg per tablet; Take 1 Tablet by mouth every eight (8) hours as needed for Pain for up to 30 days. Max Daily Amount: 3 Tablets. -     oxyCODONE-acetaminophen (PERCOCET 10)  mg per tablet; Take 1 Tablet by mouth every eight (8) hours as needed for Pain for up to 30 days. Max Daily Amount: 3 Tablets. 5. Neck muscle spasm  -     cyclobenzaprine (FLEXERIL) 5 mg tablet; Take 1 Tablet by mouth nightly as needed for Muscle Spasm(s). 6. Obesity, Class III, BMI 40-49.9 (morbid obesity) ([facility]      Follow-up and Dispositions    · Return in about 4 weeks (around 9/9/2021), or if symptoms worsen or fail to improve, for INR check. I have discussed the diagnosis with the patient and the intended plan as seen in the above orders. The patient has received an after-visit summary and questions were answered concerning future plans. Pt conveyed understanding of plan.     Medication Side Effects and Warnings were discussed with patient    An electronic signature was used to authenticate this note  Bianca Mares DO

## 2021-08-12 NOTE — PROGRESS NOTES
Chief Complaint   Patient presents with    Follow-up     INR     Patient presents in office today for INR check. No concerns. 1. Have you been to the ER, urgent care clinic since your last visit? Hospitalized since your last visit? No    2. Have you seen or consulted any other health care providers outside of the [address] [place] [address] since your last visit? Include any pap smears or colon screening.  No    Learning Assessment 6/28/2019   PRIMARY LEARNER Patient   PRIMARY LANGUAGE ENGLISH   LEARNER PREFERENCE PRIMARY LISTENING   ANSWERED BY patient    RELATIONSHIP SELF

## 2021-08-12 NOTE — TELEPHONE ENCOUNTER
Called and spoke with patient. Advise that we can see her at 1:10 this afternoon. Patient verbalized understanding.

## 2021-09-01 NOTE — PATIENT INSTRUCTIONS
Patrice Harrison is a 11 year old 1 month old child here for health maintenance exam with mom.    Concerns:  None.  No current allergy symptoms.  Loratadine has helped nicely over the last year when needed.  Nutrition:  Well-balanced diet  Sleep:  No concerns  Dentist visits: Yes: Normal findings  Education:  Started 3rd grade and doing well  Extracurriculars:  Baseball especially    Past Medical History:   Diagnosis Date   • Torticollis     improving       History Update    New health problems in the family over the past 12 months:  None    Significant family changes in the past 12 months:  None    Food insecurity concerns in the past 12 months:  No food insecurity concerns identified    Physical Exam:  Visit Vitals  /62   Pulse 80   Temp 98.6 °F (37 °C)   Ht 4' 5.75\" (1.365 m)   Wt 30.8 kg   BMI 16.55 kg/m²     Blood pressure percentiles are 61 % systolic and 56 % diastolic based on the 2017 AAP Clinical Practice Guideline. This reading is in the normal blood pressure range.  General:  Well developed, well nourished, healthy appearing child.  HEENT:  Head:  Normocephalic.  Eyes:  Conjugate gaze without injection or discharge. Ears:  Ear canals unremarkable.  Tympanic membranes clear and mobile.  Nose:  No rhinorrhea.  Throat:  Oropharynx benign.  Tonsils normal sized without erythema or exudate.  Dentition normal.  Neck:  Supple, no lymphadenopathy or masses.  Chest:  Chest wall is normal in appearance.  Lungs are clear to auscultation bilaterally with normal respiratory effort.  Cardiac:  Regular rate and rhythm.  Heart sounds normal S1 and S2; no murmurs, rubs or gallops.  Peripheral pulses within normal limits.  Distal capillary refill is less than 2 seconds.  Abdomen:  Soft, nondistended, nontender.  No masses or hepatosplenomegaly.  Genitalia:  Normal Leonid 1 male normal male phallus testes descended bilaterally no hernia circumcised.  Extremities:  No deformities or lesions.  Spine:   Stop Hydralazine completely  Increase Coreg to 1.5 tablets twice daily with food. Straight.  Neurologic:  Alert, normal muscle tone, strength and deep tendon reflexes.  Gait is normal.  Skin:  No rashes or lesions.    ASSESSMENT:    1. Healthy 11 year old 1 month old child, normal growth and development.  2. History of allergic rhinitis, quiescent    PLAN:    1. Bright Futures Handout is given to family and reviewed today.   2. Next full health maintenance exam should be in one year.  3. flu shot given - side effects, benefits, and VIS reviewed.   4. Loratadine 10 mg by mouth once daily as needed for allergy symptoms should they be problematic

## 2021-09-10 ENCOUNTER — OFFICE VISIT (OUTPATIENT)
Dept: FAMILY MEDICINE CLINIC | Age: 66
End: 2021-09-10
Payer: MEDICARE

## 2021-09-10 VITALS
OXYGEN SATURATION: 91 % | SYSTOLIC BLOOD PRESSURE: 101 MMHG | HEART RATE: 73 BPM | BODY MASS INDEX: 40.94 KG/M2 | DIASTOLIC BLOOD PRESSURE: 65 MMHG | TEMPERATURE: 98 F | HEIGHT: 70 IN | RESPIRATION RATE: 16 BRPM | WEIGHT: 285.94 LBS

## 2021-09-10 DIAGNOSIS — Z79.01 ENCOUNTER FOR MONITORING COUMADIN THERAPY: Primary | ICD-10-CM

## 2021-09-10 DIAGNOSIS — R31.0 GROSS HEMATURIA: ICD-10-CM

## 2021-09-10 DIAGNOSIS — L30.9 ECZEMA, UNSPECIFIED TYPE: ICD-10-CM

## 2021-09-10 DIAGNOSIS — Z51.81 ENCOUNTER FOR MONITORING COUMADIN THERAPY: Primary | ICD-10-CM

## 2021-09-10 LAB
INR BLD: 2.1
PT POC: 25.6 SECONDS
VALID INTERNAL CONTROL?: YES

## 2021-09-10 PROCEDURE — G8427 DOCREV CUR MEDS BY ELIG CLIN: HCPCS | Performed by: FAMILY MEDICINE

## 2021-09-10 PROCEDURE — G9231 DOC ESRD DIA TRANS PREG: HCPCS | Performed by: FAMILY MEDICINE

## 2021-09-10 PROCEDURE — G8417 CALC BMI ABV UP PARAM F/U: HCPCS | Performed by: FAMILY MEDICINE

## 2021-09-10 PROCEDURE — 99214 OFFICE O/P EST MOD 30 MIN: CPT | Performed by: FAMILY MEDICINE

## 2021-09-10 PROCEDURE — G9899 SCRN MAM PERF RSLTS DOC: HCPCS | Performed by: FAMILY MEDICINE

## 2021-09-10 PROCEDURE — 3017F COLORECTAL CA SCREEN DOC REV: CPT | Performed by: FAMILY MEDICINE

## 2021-09-10 PROCEDURE — G8510 SCR DEP NEG, NO PLAN REQD: HCPCS | Performed by: FAMILY MEDICINE

## 2021-09-10 PROCEDURE — G0463 HOSPITAL OUTPT CLINIC VISIT: HCPCS | Performed by: FAMILY MEDICINE

## 2021-09-10 PROCEDURE — 85610 PROTHROMBIN TIME: CPT | Performed by: FAMILY MEDICINE

## 2021-09-10 RX ORDER — TRIAMCINOLONE ACETONIDE 1 MG/G
CREAM TOPICAL 2 TIMES DAILY
Qty: 80 G | Refills: 0 | Status: SHIPPED | OUTPATIENT
Start: 2021-09-10 | End: 2022-05-28

## 2021-09-10 NOTE — PATIENT INSTRUCTIONS
A Healthy Lifestyle: Care Instructions  Your Care Instructions     A healthy lifestyle can help you feel good, stay at a healthy weight, and have plenty of energy for both work and play. A healthy lifestyle is something you can share with your whole family. A healthy lifestyle also can lower your risk for serious health problems, such as high blood pressure, heart disease, and diabetes. You can follow a few steps listed below to improve your health and the health of your family. Follow-up care is a key part of your treatment and safety. Be sure to make and go to all appointments, and call your doctor if you are having problems. It's also a good idea to know your test results and keep a list of the medicines you take. How can you care for yourself at home? · Do not eat too much sugar, fat, or fast foods. You can still have dessert and treats now and then. The goal is moderation. · Start small to improve your eating habits. Pay attention to portion sizes, drink less juice and soda pop, and eat more fruits and vegetables. ? Eat a healthy amount of food. A 3-ounce serving of meat, for example, is about the size of a deck of cards. Fill the rest of your plate with vegetables and whole grains. ? Limit the amount of soda and sports drinks you have every day. Drink more water when you are thirsty. ? Eat plenty of fruits and vegetables every day. Have an apple or some carrot sticks as an afternoon snack instead of a candy bar. Try to have fruits and/or vegetables at every meal.  · Make exercise part of your daily routine. You may want to start with simple activities, such as walking, bicycling, or slow swimming. Try to be active 30 to 60 minutes every day. You do not need to do all 30 to 60 minutes all at once. For example, you can exercise 3 times a day for 10 or 20 minutes.  Moderate exercise is safe for most people, but it is always a good idea to talk to your doctor before starting an exercise program.  · Keep moving. Disha Clas the lawn, work in the garden, or [employer]. Take the stairs instead of the elevator at work. · If you smoke, quit. People who smoke have an increased risk for heart attack, stroke, cancer, and other lung illnesses. Quitting is hard, but there are ways to boost your chance of quitting tobacco for good. ? Use nicotine gum, patches, or lozenges. ? Ask your doctor about stop-smoking programs and medicines. ? Keep trying. In addition to reducing your risk of diseases in the future, you will notice some benefits soon after you stop using tobacco. If you have shortness of breath or asthma symptoms, they will likely get better within a few weeks after you quit. · Limit how much alcohol you drink. Moderate amounts of alcohol (up to 2 drinks a day for men, 1 drink a day for women) are okay. But drinking too much can lead to liver problems, high blood pressure, and other health problems. Family health  If you have a family, there are many things you can do together to improve your health. · Eat meals together as a family as often as possible. · Eat healthy foods. This includes fruits, vegetables, lean meats and dairy, and whole grains. · Include your family in your fitness plan. Most people think of activities such as jogging or tennis as the way to fitness, but there are many ways you and your family can be more active. Anything that makes you breathe hard and gets your heart pumping is exercise. Here are some tips:  ? Walk to do errands or to take your child to school or the bus.  ? Go for a family bike ride after dinner instead of watching TV. Where can you learn more? Go to http://www.gray.com/  Enter J265 in the search box to learn more about \"A Healthy Lifestyle: Care Instructions. \"  Current as of: September 23, 2020               Content Version: 12.8  © 7498-0809 Healthwise, Incorporated.    Care instructions adapted under license by [employer] (which disclaims liability or warranty for this information). If you have questions about a medical condition or this instruction, always ask your healthcare professional. [address] any warranty or liability for your use of this information.

## 2021-09-10 NOTE — PROGRESS NOTES
Progress Note    she is a 63y.o. year old female who presents for evalution. Subjective:     Pt here for INR and states she had hematuria the other day [facility]  Nothing was done for this. She was having groin pain at the time as well and was advised to see urology which she has made an appt with. INR is 2.1 today. She is taking    2.5 Severo Blow Friday Sat, 3 MW    Reports eczema flare on buttocks and would like something for it. Very itchy. Reviewed PmHx, RxHx, FmHx, SocHx, AllgHx and updated and dated in the chart. Review of Systems - negative except as listed above in the HPI    Objective:     Vitals:    09/10/21 1446   BP: 101/65   Pulse: 73   Resp: 16   Temp: 98 °F (36.7 °C)   TempSrc: Oral   SpO2: 91%   Weight: 285 lb 15 oz (129.7 kg)   Height: 5' 10\" (1.778 m)       Current Outpatient Medications   Medication Sig    triamcinolone acetonide (KENALOG) 0.1 % topical cream Apply  to affected area two (2) times a day. use thin layer    [START ON 10/11/2021] oxyCODONE-acetaminophen (PERCOCET 10)  mg per tablet Take 1 Tablet by mouth every eight (8) hours as needed for Pain for up to 30 days. Max Daily Amount: 3 Tablets.  [START ON 9/11/2021] oxyCODONE-acetaminophen (PERCOCET 10)  mg per tablet Take 1 Tablet by mouth every eight (8) hours as needed for Pain for up to 30 days. Max Daily Amount: 3 Tablets.  oxyCODONE-acetaminophen (PERCOCET 10)  mg per tablet Take 1 Tablet by mouth every eight (8) hours as needed for Pain for up to 30 days. Max Daily Amount: 3 Tablets.  cyclobenzaprine (FLEXERIL) 5 mg tablet Take 1 Tablet by mouth nightly as needed for Muscle Spasm(s).  ergocalciferol (ERGOCALCIFEROL) 1,250 mcg (50,000 unit) capsule TAKE 1 CAPSULE BY MOUTH EVERY TUESDAY    mupirocin (BACTROBAN) 2 % ointment Apply  to affected area two (2) times a day.     amLODIPine (NORVASC) 10 mg tablet TAKE 1 TABLET BY MOUTH ONCE DAILY FOR HIGH BLOOD PRESSURE    allopurinoL (ZYLOPRIM) 100 mg tablet Take 1 tablet by mouth once daily    pantoprazole (PROTONIX) 40 mg tablet TAKE 1 TABLET BY MOUTH TWICE DAILY FOR HEART    isosorbide mononitrate ER (IMDUR) 120 mg CR tablet 1 tab PO qday    warfarin (COUMADIN) 2.5 mg tablet Take 1 Tab by mouth every other day.  carvediloL (COREG) 25 mg tablet TAKE 1 & 1/2 (ONE & ONE-HALF) TABLETS BY MOUTH TWICE DAILY WITH MEALS    warfarin (COUMADIN) 3 mg tablet Take 1 Tab by mouth every other day.  atorvastatin (LIPITOR) 80 mg tablet TAKE 1 TABLET BY MOUTH ONCE DAILY NIGHTLY    sucralfate (CARAFATE) 1 gram tablet Take 1 tablet by mouth 4 times daily    docusate sodium (STOOL SOFTENER PO) Take  by mouth daily.  nitroglycerin (NITROSTAT) 0.4 mg SL tablet 1 Tab by SubLINGual route every five (5) minutes as needed for Chest Pain. Up to 3 doses.  naloxone (NARCAN) 4 mg/actuation nasal spray Use 1 spray intranasally, then discard. Repeat with new spray every 2 min as needed for opioid overdose symptoms, alternating nostrils.  albuterol (PROAIR HFA) 90 mcg/actuation inhaler Take 2 Puffs by inhalation every four (4) hours as needed for Wheezing.  Oxygen O2 2L NC 24/7    predniSONE (STERAPRED DS) 10 mg dose pack See administration instruction per 10mg dose pack (Patient not taking: Reported on 7/8/2021)    levocetirizine (XYZAL) 5 mg tablet Take 1 Tab by mouth daily. (Patient not taking: Reported on 9/10/2021)     No current facility-administered medications for this visit. Physical Examination: General appearance - alert, well appearing, and in no distress  Mental status - alert, oriented to person, place, and time      Assessment/ Plan:   Diagnoses and all orders for this visit:    1. Encounter for monitoring Coumadin therapy  -     AMB POC PT/INR  At goal continue current dosing follow-up 1 month for recheck.   2. Eczema, unspecified type  -     triamcinolone acetonide (KENALOG) 0.1 % topical cream; Apply to affected area two (2) times a day. use thin layer    3. Gross hematuria  appt with urology     Follow-up and Dispositions    · Return in about 4 weeks (around 10/8/2021), or if symptoms worsen or fail to improve. I have discussed the diagnosis with the patient and the intended plan as seen in the above orders. The patient has received an after-visit summary and questions were answered concerning future plans. Pt conveyed understanding of plan.     Medication Side Effects and Warnings were discussed with patient    An electronic signature was used to authenticate this note  Phoenix hTakur DO

## 2021-09-10 NOTE — PROGRESS NOTES
Chief Complaint   Patient presents with    Follow-up     IRN     Patient presents in office today for INR check. No concerns. 1. Have you been to the ER, urgent care clinic since your last visit? Hospitalized since your last visit? No    2. Have you seen or consulted any other health care providers outside of the [address] [place] [address] since your last visit? Include any pap smears or colon screening.  No    Learning Assessment 6/28/2019   PRIMARY LEARNER Patient   PRIMARY LANGUAGE ENGLISH   LEARNER PREFERENCE PRIMARY LISTENING   ANSWERED BY patient    RELATIONSHIP SELF

## 2021-10-22 ENCOUNTER — OFFICE VISIT (OUTPATIENT)
Dept: FAMILY MEDICINE CLINIC | Age: 66
End: 2021-10-22
Payer: MEDICARE

## 2021-10-22 VITALS
RESPIRATION RATE: 16 BRPM | HEIGHT: 70 IN | HEART RATE: 76 BPM | TEMPERATURE: 98.3 F | OXYGEN SATURATION: 94 % | BODY MASS INDEX: 40.4 KG/M2 | SYSTOLIC BLOOD PRESSURE: 99 MMHG | WEIGHT: 282.19 LBS | DIASTOLIC BLOOD PRESSURE: 63 MMHG

## 2021-10-22 DIAGNOSIS — Z79.01 ENCOUNTER FOR MONITORING COUMADIN THERAPY: Primary | ICD-10-CM

## 2021-10-22 DIAGNOSIS — Z51.81 ENCOUNTER FOR MONITORING COUMADIN THERAPY: Primary | ICD-10-CM

## 2021-10-22 DIAGNOSIS — R10.13 EPIGASTRIC PAIN: ICD-10-CM

## 2021-10-22 LAB
INR BLD: 1.7
PT POC: 21.3 SECONDS
VALID INTERNAL CONTROL?: YES

## 2021-10-22 PROCEDURE — G8427 DOCREV CUR MEDS BY ELIG CLIN: HCPCS | Performed by: FAMILY MEDICINE

## 2021-10-22 PROCEDURE — 99214 OFFICE O/P EST MOD 30 MIN: CPT | Performed by: FAMILY MEDICINE

## 2021-10-22 PROCEDURE — G8510 SCR DEP NEG, NO PLAN REQD: HCPCS | Performed by: FAMILY MEDICINE

## 2021-10-22 PROCEDURE — 3017F COLORECTAL CA SCREEN DOC REV: CPT | Performed by: FAMILY MEDICINE

## 2021-10-22 PROCEDURE — G9899 SCRN MAM PERF RSLTS DOC: HCPCS | Performed by: FAMILY MEDICINE

## 2021-10-22 PROCEDURE — G8417 CALC BMI ABV UP PARAM F/U: HCPCS | Performed by: FAMILY MEDICINE

## 2021-10-22 PROCEDURE — 85610 PROTHROMBIN TIME: CPT | Performed by: FAMILY MEDICINE

## 2021-10-22 PROCEDURE — G9231 DOC ESRD DIA TRANS PREG: HCPCS | Performed by: FAMILY MEDICINE

## 2021-10-22 NOTE — PATIENT INSTRUCTIONS
A Healthy Lifestyle: Care Instructions  Your Care Instructions     A healthy lifestyle can help you feel good, stay at a healthy weight, and have plenty of energy for both work and play. A healthy lifestyle is something you can share with your whole family. A healthy lifestyle also can lower your risk for serious health problems, such as high blood pressure, heart disease, and diabetes. You can follow a few steps listed below to improve your health and the health of your family. Follow-up care is a key part of your treatment and safety. Be sure to make and go to all appointments, and call your doctor if you are having problems. It's also a good idea to know your test results and keep a list of the medicines you take. How can you care for yourself at home? · Do not eat too much sugar, fat, or fast foods. You can still have dessert and treats now and then. The goal is moderation. · Start small to improve your eating habits. Pay attention to portion sizes, drink less juice and soda pop, and eat more fruits and vegetables. ? Eat a healthy amount of food. A 3-ounce serving of meat, for example, is about the size of a deck of cards. Fill the rest of your plate with vegetables and whole grains. ? Limit the amount of soda and sports drinks you have every day. Drink more water when you are thirsty. ? Eat plenty of fruits and vegetables every day. Have an apple or some carrot sticks as an afternoon snack instead of a candy bar. Try to have fruits and/or vegetables at every meal.  · Make exercise part of your daily routine. You may want to start with simple activities, such as walking, bicycling, or slow swimming. Try to be active 30 to 60 minutes every day. You do not need to do all 30 to 60 minutes all at once. For example, you can exercise 3 times a day for 10 or 20 minutes.  Moderate exercise is safe for most people, but it is always a good idea to talk to your doctor before starting an exercise program.  · Keep moving. Sharri Case the lawn, work in the garden, or [employer]. Take the stairs instead of the elevator at work. · If you smoke, quit. People who smoke have an increased risk for heart attack, stroke, cancer, and other lung illnesses. Quitting is hard, but there are ways to boost your chance of quitting tobacco for good. ? Use nicotine gum, patches, or lozenges. ? Ask your doctor about stop-smoking programs and medicines. ? Keep trying. In addition to reducing your risk of diseases in the future, you will notice some benefits soon after you stop using tobacco. If you have shortness of breath or asthma symptoms, they will likely get better within a few weeks after you quit. · Limit how much alcohol you drink. Moderate amounts of alcohol (up to 2 drinks a day for men, 1 drink a day for women) are okay. But drinking too much can lead to liver problems, high blood pressure, and other health problems. Family health  If you have a family, there are many things you can do together to improve your health. · Eat meals together as a family as often as possible. · Eat healthy foods. This includes fruits, vegetables, lean meats and dairy, and whole grains. · Include your family in your fitness plan. Most people think of activities such as jogging or tennis as the way to fitness, but there are many ways you and your family can be more active. Anything that makes you breathe hard and gets your heart pumping is exercise. Here are some tips:  ? Walk to do errands or to take your child to school or the bus.  ? Go for a family bike ride after dinner instead of watching TV. Where can you learn more? Go to http://www.gray.com/  Enter Z236 in the search box to learn more about \"A Healthy Lifestyle: Care Instructions. \"  Current as of: June 16, 2021               Content Version: 13.0  © 4927-4856 Healthwise, Incorporated.    Care instructions adapted under license by [employer] (which disclaims liability or warranty for this information). If you have questions about a medical condition or this instruction, always ask your healthcare professional. [address] any warranty or liability for your use of this information.

## 2021-10-22 NOTE — PROGRESS NOTES
Chief Complaint   Patient presents with    Follow-up     INR     Patient presents in office today for INR check. Has c/o lower abdominal pain when she eats. No other concerns    1. Have you been to the ER, urgent care clinic since your last visit? Hospitalized since your last visit? No    2. Have you seen or consulted any other health care providers outside of the [address] [place] [address] since your last visit? Include any pap smears or colon screening.  No    Learning Assessment 6/28/2019   PRIMARY LEARNER Patient   PRIMARY LANGUAGE ENGLISH   LEARNER PREFERENCE PRIMARY LISTENING   ANSWERED BY patient    RELATIONSHIP SELF

## 2021-10-22 NOTE — PROGRESS NOTES
Progress Note    she is a 63y.o. year old female who presents for evalution. Subjective:     Pt for INR test for PE's currently at 1.7 had some greens likely cause of low. States she is getting stomach pain when she eats and staying nauseated all of the time. Currently on protonix 40 bid. Last EGD was last year has not seen GI since. No hx of H pylori infection. No etoh intake at all. Does have hx of pancreatitis. No gallbladder. Reviewed PmHx, RxHx, FmHx, SocHx, AllgHx and updated and dated in the chart. Review of Systems - negative except as listed above in the HPI    Objective:     Vitals:    10/22/21 1352   BP: 99/63   Pulse: 76   Resp: 16   Temp: 98.3 °F (36.8 °C)   TempSrc: Oral   SpO2: 94%   Weight: 282 lb 3 oz (128 kg)   Height: 5' 10\" (1.778 m)       Current Outpatient Medications   Medication Sig    triamcinolone acetonide (KENALOG) 0.1 % topical cream Apply  to affected area two (2) times a day. use thin layer    oxyCODONE-acetaminophen (PERCOCET 10)  mg per tablet Take 1 Tablet by mouth every eight (8) hours as needed for Pain for up to 30 days. Max Daily Amount: 3 Tablets.  cyclobenzaprine (FLEXERIL) 5 mg tablet Take 1 Tablet by mouth nightly as needed for Muscle Spasm(s).  ergocalciferol (ERGOCALCIFEROL) 1,250 mcg (50,000 unit) capsule TAKE 1 CAPSULE BY MOUTH EVERY TUESDAY    mupirocin (BACTROBAN) 2 % ointment Apply  to affected area two (2) times a day.  amLODIPine (NORVASC) 10 mg tablet TAKE 1 TABLET BY MOUTH ONCE DAILY FOR HIGH BLOOD PRESSURE    allopurinoL (ZYLOPRIM) 100 mg tablet Take 1 tablet by mouth once daily    pantoprazole (PROTONIX) 40 mg tablet TAKE 1 TABLET BY MOUTH TWICE DAILY FOR HEART    isosorbide mononitrate ER (IMDUR) 120 mg CR tablet 1 tab PO qday    warfarin (COUMADIN) 2.5 mg tablet Take 1 Tab by mouth every other day.     carvediloL (COREG) 25 mg tablet TAKE 1 & 1/2 (ONE & ONE-HALF) TABLETS BY MOUTH TWICE DAILY WITH MEALS    warfarin (COUMADIN) 3 mg tablet Take 1 Tab by mouth every other day.  atorvastatin (LIPITOR) 80 mg tablet TAKE 1 TABLET BY MOUTH ONCE DAILY NIGHTLY    sucralfate (CARAFATE) 1 gram tablet Take 1 tablet by mouth 4 times daily    docusate sodium (STOOL SOFTENER PO) Take  by mouth daily.  nitroglycerin (NITROSTAT) 0.4 mg SL tablet 1 Tab by SubLINGual route every five (5) minutes as needed for Chest Pain. Up to 3 doses.  naloxone (NARCAN) 4 mg/actuation nasal spray Use 1 spray intranasally, then discard. Repeat with new spray every 2 min as needed for opioid overdose symptoms, alternating nostrils.  albuterol (PROAIR HFA) 90 mcg/actuation inhaler Take 2 Puffs by inhalation every four (4) hours as needed for Wheezing.  Oxygen O2 2L NC 24/7    predniSONE (STERAPRED DS) 10 mg dose pack See administration instruction per 10mg dose pack (Patient not taking: Reported on 7/8/2021)    levocetirizine (XYZAL) 5 mg tablet Take 1 Tab by mouth daily. (Patient not taking: Reported on 9/10/2021)     No current facility-administered medications for this visit. Physical Examination: General appearance - alert, well appearing, and in no distress  Abdomen - tenderness noted epigastric      Assessment/ Plan:   Diagnoses and all orders for this visit:    1. Encounter for monitoring Coumadin therapy  -     AMB POC PT/INR  5mg today then resume usual; and recheck 2 weeks  2. Epigastric pain  -     LIPASE; Future  -     AMYLASE; Future  -     METABOLIC PANEL, COMPREHENSIVE; Future  Start pepcid 20 bid     Follow-up and Dispositions    · Return in about 2 weeks (around 11/5/2021), or if symptoms worsen or fail to improve. I have discussed the diagnosis with the patient and the intended plan as seen in the above orders. The patient has received an after-visit summary and questions were answered concerning future plans. Pt conveyed understanding of plan.     Medication Side Effects and Warnings were discussed with patient    An electronic signature was used to authenticate this note  Soila Milton, DO

## 2021-10-23 LAB
ALBUMIN SERPL-MCNC: 3.1 G/DL (ref 3.5–5)
ALBUMIN/GLOB SERPL: 0.7 {RATIO} (ref 1.1–2.2)
ALP SERPL-CCNC: 171 U/L (ref 45–117)
ALT SERPL-CCNC: 11 U/L (ref 12–78)
AMYLASE SERPL-CCNC: 68 U/L (ref 25–115)
ANION GAP SERPL CALC-SCNC: 7 MMOL/L (ref 5–15)
AST SERPL-CCNC: 7 U/L (ref 15–37)
BILIRUB SERPL-MCNC: 0.4 MG/DL (ref 0.2–1)
BUN SERPL-MCNC: 20 MG/DL (ref 6–20)
BUN/CREAT SERPL: 4 (ref 12–20)
CALCIUM SERPL-MCNC: 9 MG/DL (ref 8.5–10.1)
CHLORIDE SERPL-SCNC: 98 MMOL/L (ref 97–108)
CO2 SERPL-SCNC: 33 MMOL/L (ref 21–32)
CREAT SERPL-MCNC: 5.07 MG/DL (ref 0.55–1.02)
GLOBULIN SER CALC-MCNC: 4.3 G/DL (ref 2–4)
GLUCOSE SERPL-MCNC: 104 MG/DL (ref 65–100)
LIPASE SERPL-CCNC: 115 U/L (ref 73–393)
POTASSIUM SERPL-SCNC: 3.5 MMOL/L (ref 3.5–5.1)
PROT SERPL-MCNC: 7.4 G/DL (ref 6.4–8.2)
SODIUM SERPL-SCNC: 138 MMOL/L (ref 136–145)

## 2021-11-05 ENCOUNTER — OFFICE VISIT (OUTPATIENT)
Dept: FAMILY MEDICINE CLINIC | Age: 66
End: 2021-11-05
Payer: MEDICARE

## 2021-11-05 VITALS
SYSTOLIC BLOOD PRESSURE: 107 MMHG | OXYGEN SATURATION: 94 % | BODY MASS INDEX: 40.84 KG/M2 | HEIGHT: 70 IN | WEIGHT: 285.27 LBS | HEART RATE: 84 BPM | DIASTOLIC BLOOD PRESSURE: 66 MMHG | TEMPERATURE: 98.6 F | RESPIRATION RATE: 16 BRPM

## 2021-11-05 DIAGNOSIS — Z51.81 ENCOUNTER FOR MONITORING COUMADIN THERAPY: Primary | ICD-10-CM

## 2021-11-05 DIAGNOSIS — I26.99 OTHER PULMONARY EMBOLISM WITHOUT ACUTE COR PULMONALE, UNSPECIFIED CHRONICITY (HCC): ICD-10-CM

## 2021-11-05 DIAGNOSIS — Z79.01 ENCOUNTER FOR MONITORING COUMADIN THERAPY: Primary | ICD-10-CM

## 2021-11-05 DIAGNOSIS — R10.13 EPIGASTRIC PAIN: ICD-10-CM

## 2021-11-05 DIAGNOSIS — I10 PRIMARY HYPERTENSION: ICD-10-CM

## 2021-11-05 LAB
INR BLD: 1.7
PT POC: 20.9 SECONDS
VALID INTERNAL CONTROL?: YES

## 2021-11-05 PROCEDURE — 3017F COLORECTAL CA SCREEN DOC REV: CPT | Performed by: FAMILY MEDICINE

## 2021-11-05 PROCEDURE — 99214 OFFICE O/P EST MOD 30 MIN: CPT | Performed by: FAMILY MEDICINE

## 2021-11-05 PROCEDURE — G0463 HOSPITAL OUTPT CLINIC VISIT: HCPCS | Performed by: FAMILY MEDICINE

## 2021-11-05 PROCEDURE — G9899 SCRN MAM PERF RSLTS DOC: HCPCS | Performed by: FAMILY MEDICINE

## 2021-11-05 PROCEDURE — G8427 DOCREV CUR MEDS BY ELIG CLIN: HCPCS | Performed by: FAMILY MEDICINE

## 2021-11-05 PROCEDURE — 85610 PROTHROMBIN TIME: CPT | Performed by: FAMILY MEDICINE

## 2021-11-05 PROCEDURE — G9231 DOC ESRD DIA TRANS PREG: HCPCS | Performed by: FAMILY MEDICINE

## 2021-11-05 PROCEDURE — G8417 CALC BMI ABV UP PARAM F/U: HCPCS | Performed by: FAMILY MEDICINE

## 2021-11-05 PROCEDURE — G8432 DEP SCR NOT DOC, RNG: HCPCS | Performed by: FAMILY MEDICINE

## 2021-11-05 RX ORDER — CARVEDILOL 25 MG/1
TABLET ORAL
Qty: 180 TABLET | Refills: 0
Start: 2021-11-05 | End: 2022-07-06 | Stop reason: DRUGHIGH

## 2021-11-05 RX ORDER — WARFARIN 3 MG/1
TABLET ORAL
Qty: 80 TABLET | Refills: 4 | Status: SHIPPED | OUTPATIENT
Start: 2021-11-05 | End: 2022-03-25

## 2021-11-05 RX ORDER — WARFARIN 2.5 MG/1
TABLET ORAL
Qty: 30 TABLET | Refills: 5 | Status: SHIPPED | OUTPATIENT
Start: 2021-11-05 | End: 2022-03-25

## 2021-11-05 RX ORDER — ESOMEPRAZOLE MAGNESIUM 40 MG/1
40 CAPSULE, DELAYED RELEASE ORAL 2 TIMES DAILY
Qty: 60 CAPSULE | Refills: 5 | Status: SHIPPED | OUTPATIENT
Start: 2021-11-05 | End: 2022-04-12

## 2021-11-05 NOTE — PATIENT INSTRUCTIONS
A Healthy Lifestyle: Care Instructions  Your Care Instructions     A healthy lifestyle can help you feel good, stay at a healthy weight, and have plenty of energy for both work and play. A healthy lifestyle is something you can share with your whole family. A healthy lifestyle also can lower your risk for serious health problems, such as high blood pressure, heart disease, and diabetes. You can follow a few steps listed below to improve your health and the health of your family. Follow-up care is a key part of your treatment and safety. Be sure to make and go to all appointments, and call your doctor if you are having problems. It's also a good idea to know your test results and keep a list of the medicines you take. How can you care for yourself at home? · Do not eat too much sugar, fat, or fast foods. You can still have dessert and treats now and then. The goal is moderation. · Start small to improve your eating habits. Pay attention to portion sizes, drink less juice and soda pop, and eat more fruits and vegetables. ? Eat a healthy amount of food. A 3-ounce serving of meat, for example, is about the size of a deck of cards. Fill the rest of your plate with vegetables and whole grains. ? Limit the amount of soda and sports drinks you have every day. Drink more water when you are thirsty. ? Eat plenty of fruits and vegetables every day. Have an apple or some carrot sticks as an afternoon snack instead of a candy bar. Try to have fruits and/or vegetables at every meal.  · Make exercise part of your daily routine. You may want to start with simple activities, such as walking, bicycling, or slow swimming. Try to be active 30 to 60 minutes every day. You do not need to do all 30 to 60 minutes all at once. For example, you can exercise 3 times a day for 10 or 20 minutes.  Moderate exercise is safe for most people, but it is always a good idea to talk to your doctor before starting an exercise program.  · Keep moving. [facility] the lawn, work in the garden, or [employer]. Take the stairs instead of the elevator at work. · If you smoke, quit. People who smoke have an increased risk for heart attack, stroke, cancer, and other lung illnesses. Quitting is hard, but there are ways to boost your chance of quitting tobacco for good. ? Use nicotine gum, patches, or lozenges. ? Ask your doctor about stop-smoking programs and medicines. ? Keep trying. In addition to reducing your risk of diseases in the future, you will notice some benefits soon after you stop using tobacco. If you have shortness of breath or asthma symptoms, they will likely get better within a few weeks after you quit. · Limit how much alcohol you drink. Moderate amounts of alcohol (up to 2 drinks a day for men, 1 drink a day for women) are okay. But drinking too much can lead to liver problems, high blood pressure, and other health problems. Family health  If you have a family, there are many things you can do together to improve your health. · Eat meals together as a family as often as possible. · Eat healthy foods. This includes fruits, vegetables, lean meats and dairy, and whole grains. · Include your family in your fitness plan. Most people think of activities such as jogging or tennis as the way to fitness, but there are many ways you and your family can be more active. Anything that makes you breathe hard and gets your heart pumping is exercise. Here are some tips:  ? Walk to do errands or to take your child to school or the bus.  ? Go for a family bike ride after dinner instead of watching TV. Where can you learn more? Go to http://www.gray.com/  Enter S709 in the search box to learn more about \"A Healthy Lifestyle: Care Instructions. \"  Current as of: June 16, 2021               Content Version: 13.0  © 4882-8454 Healthwise, Incorporated.    Care instructions adapted under license by [employer] (which disclaims liability or warranty for this information). If you have questions about a medical condition or this instruction, always ask your healthcare professional. [address] any warranty or liability for your use of this information.

## 2021-11-05 NOTE — PROGRESS NOTES
Progress Note    she is a 63y.o. year old female who presents for evalution. Subjective:     Pt for f/u on her inr taking coumadin 2.5 5 days a week and 3 2 days a week. Unchanged from prior check at 1.7 today. Pt epigastric pain continues. Pancreatic enzymes were normal.  She has a history of ulcers. She has not seen her GI doctor in quite a while. Reviewed PmHx, RxHx, FmHx, SocHx, AllgHx and updated and dated in the chart. Review of Systems - negative except as listed above in the HPI    Objective:     Vitals:    11/05/21 1349   BP: 107/66   Pulse: 84   Resp: 16   Temp: 98.6 °F (37 °C)   TempSrc: Oral   SpO2: 94%   Weight: 285 lb 4.4 oz (129.4 kg)   Height: 5' 10\" (1.778 m)       Current Outpatient Medications   Medication Sig    carvediloL (COREG) 25 mg tablet TAKE 1 TABLET BY MOUTH TWICE DAILY WITH MEALS    esomeprazole (NexIUM) 40 mg capsule Take 1 Capsule by mouth two (2) times a day.  warfarin (COUMADIN) 2.5 mg tablet 1 tab PO 2 days per week.  warfarin (COUMADIN) 3 mg tablet 1 tab PO qday x 5 days a week    triamcinolone acetonide (KENALOG) 0.1 % topical cream Apply  to affected area two (2) times a day. use thin layer    oxyCODONE-acetaminophen (PERCOCET 10)  mg per tablet Take 1 Tablet by mouth every eight (8) hours as needed for Pain for up to 30 days. Max Daily Amount: 3 Tablets.  cyclobenzaprine (FLEXERIL) 5 mg tablet Take 1 Tablet by mouth nightly as needed for Muscle Spasm(s).  ergocalciferol (ERGOCALCIFEROL) 1,250 mcg (50,000 unit) capsule TAKE 1 CAPSULE BY MOUTH EVERY TUESDAY    mupirocin (BACTROBAN) 2 % ointment Apply  to affected area two (2) times a day.     allopurinoL (ZYLOPRIM) 100 mg tablet Take 1 tablet by mouth once daily    isosorbide mononitrate ER (IMDUR) 120 mg CR tablet 1 tab PO qday (Patient taking differently: 1/2 tablet)    atorvastatin (LIPITOR) 80 mg tablet TAKE 1 TABLET BY MOUTH ONCE DAILY NIGHTLY    sucralfate (CARAFATE) 1 gram tablet Take 1 tablet by mouth 4 times daily    docusate sodium (STOOL SOFTENER PO) Take  by mouth daily.  nitroglycerin (NITROSTAT) 0.4 mg SL tablet 1 Tab by SubLINGual route every five (5) minutes as needed for Chest Pain. Up to 3 doses.  naloxone (NARCAN) 4 mg/actuation nasal spray Use 1 spray intranasally, then discard. Repeat with new spray every 2 min as needed for opioid overdose symptoms, alternating nostrils.  albuterol (PROAIR HFA) 90 mcg/actuation inhaler Take 2 Puffs by inhalation every four (4) hours as needed for Wheezing.  Oxygen O2 2L NC 24/7    amLODIPine (NORVASC) 10 mg tablet TAKE 1 TABLET BY MOUTH ONCE DAILY FOR HIGH BLOOD PRESSURE (Patient not taking: Reported on 11/5/2021)    predniSONE (STERAPRED DS) 10 mg dose pack See administration instruction per 10mg dose pack (Patient not taking: Reported on 7/8/2021)    levocetirizine (XYZAL) 5 mg tablet Take 1 Tab by mouth daily. (Patient not taking: Reported on 11/5/2021)     No current facility-administered medications for this visit. Physical Examination: General appearance - alert, well appearing, and in no distress      Assessment/ Plan:   Diagnoses and all orders for this visit:    1. Encounter for monitoring Coumadin therapy  -     AMB POC PT/INR  Take 3mg 5 days a week, 2.5mg 2 days a week, double dose today  2. Primary hypertension  -     carvediloL (COREG) 25 mg tablet; TAKE 1 TABLET BY MOUTH TWICE DAILY WITH MEALS  Dose adjusted recently by cardiology  3. Epigastric pain  -     esomeprazole (NexIUM) 40 mg capsule; Take 1 Capsule by mouth two (2) times a day. Switch to nexium from protonix. Make appt with GI. Discussed with patient that a breath test for H. pylori could be false negative due to the PPI and that do not see any indication for imaging at this time. Suspect gastric issue may need EGD. She will discuss further with her GI doctor.   Follow-up and Dispositions    · Return in about 4 weeks (around 12/3/2021), or if symptoms worsen or fail to improve. I have discussed the diagnosis with the patient and the intended plan as seen in the above orders. The patient has received an after-visit summary and questions were answered concerning future plans. Pt conveyed understanding of plan.     Medication Side Effects and Warnings were discussed with patient    An electronic signature was used to authenticate this note  Shaun Tomas DO

## 2021-11-05 NOTE — PROGRESS NOTES
Chief Complaint   Patient presents with    Follow-up     INR     Patient presents in office today for INR check. Has c/o abdominal pain. No other concerns. 1. Have you been to the ER, urgent care clinic since your last visit? Hospitalized since your last visit? No    2. Have you seen or consulted any other health care providers outside of the [address] [place] [address] since your last visit? Include any pap smears or colon screening.  No    Learning Assessment 6/28/2019   PRIMARY LEARNER Patient   PRIMARY LANGUAGE ENGLISH   LEARNER PREFERENCE PRIMARY LISTENING   ANSWERED BY patient    RELATIONSHIP SELF

## 2021-11-12 RX ORDER — SUCRALFATE 1 G/1
TABLET ORAL
Qty: 120 TABLET | Refills: 5 | Status: SHIPPED | OUTPATIENT
Start: 2021-11-12

## 2021-11-15 ENCOUNTER — TELEPHONE (OUTPATIENT)
Dept: FAMILY MEDICINE CLINIC | Age: 66
End: 2021-11-15

## 2021-11-15 NOTE — TELEPHONE ENCOUNTER
----- Message from Eleonora Ramirez sent at 11/15/2021  9:50 AM EST -----  Subject: Message to Provider    QUESTIONS  Information for Provider? Patient was seen in the ER 11/8 and was   diagnosed with Upper respiratory Infection and a virus. Was given Z pack   and steroids. She still feels bad and would like additional medications   called in for her. She had a negative covid test at [facility]  ---------------------------------------------------------------------------  --------------  [address]  What is the best way for the office to contact you? OK to leave message on   voicemail  Preferred Call Back Phone Number? [phone]  ---------------------------------------------------------------------------  --------------  SCRIPT ANSWERS  Relationship to Patient?  Self

## 2021-11-15 NOTE — TELEPHONE ENCOUNTER
----- Message from Macarnea Ruelas sent at 11/15/2021  5:07 PM EST -----  Subject: Message to Provider    QUESTIONS  Information for Provider? patient called in to request refill or Zpack for   upper raspatory. Please contact patient asap! This an urgent matter. Symptoms are not clearing up! URGENt   ---------------------------------------------------------------------------  --------------  CALL BACK INFO  What is the best way for the office to contact you? OK to leave message on   voicemail  Preferred Call Back Phone Number? [phone]  ---------------------------------------------------------------------------  --------------  SCRIPT ANSWERS  Relationship to Patient?  Self

## 2021-11-16 ENCOUNTER — TELEPHONE (OUTPATIENT)
Dept: FAMILY MEDICINE CLINIC | Age: 66
End: 2021-11-16

## 2021-11-16 DIAGNOSIS — I25.118 ATHEROSCLEROSIS OF NATIVE CORONARY ARTERY OF NATIVE HEART WITH STABLE ANGINA PECTORIS (HCC): Chronic | ICD-10-CM

## 2021-11-16 RX ORDER — ATORVASTATIN CALCIUM 80 MG/1
TABLET, FILM COATED ORAL
Qty: 90 TABLET | Refills: 3 | Status: SHIPPED | OUTPATIENT
Start: 2021-11-16 | End: 2022-05-05 | Stop reason: SDUPTHER

## 2021-11-16 NOTE — TELEPHONE ENCOUNTER
----- Message from Abdon Gracia sent at 11/16/2021  1:02 PM EST -----  Subject: Medication Problem    QUESTIONS  Name of Medication? atorvastatin (LIPITOR) 80 mg tablet  Patient-reported dosage and instructions? Z-PAC for covid symptoms  What question or problem do you have with the medication? Patient has   called 3 times requesting more medication for covid symptoms, believes she   is going to catch pneumonia without medication and requested this message   be urgent. Preferred Pharmacy? [address] phone number (if available)? [phone]  Additional Information for Provider?   ---------------------------------------------------------------------------  --------------  CALL BACK INFO  What is the best way for the office to contact you? OK to leave message on   voicemail  Preferred Call Back Phone Number? [phone]  ---------------------------------------------------------------------------  --------------  SCRIPT ANSWERS  Relationship to Patient?  Self

## 2021-11-16 NOTE — TELEPHONE ENCOUNTER
Patient states that she does not have COVID. She reports that she tested negative for COVID in the ED and was dx'd with an upper respiratory infection and given a script for a Z-maggie. She insists that she get another round of the z-maggie. She also states that she needs a refill of her atorvastatin.

## 2021-11-16 NOTE — TELEPHONE ENCOUNTER
There is nothing to prevent pneumonia with Covid at this time.  Clair David treats bacterial pneumonia and Covid is virus

## 2021-11-17 NOTE — TELEPHONE ENCOUNTER
Called and spoke with patient. Advised that she needs to just wait it out and can use mucinex OTC. Patient verbalized understanding.

## 2021-11-17 NOTE — TELEPHONE ENCOUNTER
There is nothing additional to give her to make her feel better. She needs to wait it out. sHe can use Mucinex OTC.

## 2021-12-03 ENCOUNTER — DOCUMENTATION ONLY (OUTPATIENT)
Dept: FAMILY MEDICINE CLINIC | Age: 66
End: 2021-12-03

## 2021-12-09 ENCOUNTER — OFFICE VISIT (OUTPATIENT)
Dept: FAMILY MEDICINE CLINIC | Age: 66
End: 2021-12-09
Payer: MEDICARE

## 2021-12-09 VITALS
OXYGEN SATURATION: 92 % | SYSTOLIC BLOOD PRESSURE: 112 MMHG | RESPIRATION RATE: 16 BRPM | HEIGHT: 70 IN | HEART RATE: 80 BPM | TEMPERATURE: 98 F | BODY MASS INDEX: 41.66 KG/M2 | WEIGHT: 291.01 LBS | DIASTOLIC BLOOD PRESSURE: 64 MMHG

## 2021-12-09 DIAGNOSIS — G89.29 CHRONIC PAIN OF RIGHT KNEE: ICD-10-CM

## 2021-12-09 DIAGNOSIS — G89.29 CHRONIC BILATERAL LOW BACK PAIN, UNSPECIFIED WHETHER SCIATICA PRESENT: ICD-10-CM

## 2021-12-09 DIAGNOSIS — J01.10 ACUTE NON-RECURRENT FRONTAL SINUSITIS: ICD-10-CM

## 2021-12-09 DIAGNOSIS — R07.9 CHRONIC CHEST PAIN: ICD-10-CM

## 2021-12-09 DIAGNOSIS — G89.29 CHRONIC CHEST PAIN: ICD-10-CM

## 2021-12-09 DIAGNOSIS — Z51.81 MEDICATION MONITORING ENCOUNTER: ICD-10-CM

## 2021-12-09 DIAGNOSIS — M54.50 CHRONIC BILATERAL LOW BACK PAIN, UNSPECIFIED WHETHER SCIATICA PRESENT: ICD-10-CM

## 2021-12-09 DIAGNOSIS — M25.561 CHRONIC PAIN OF RIGHT KNEE: ICD-10-CM

## 2021-12-09 DIAGNOSIS — Z79.01 ENCOUNTER FOR MONITORING COUMADIN THERAPY: Primary | ICD-10-CM

## 2021-12-09 DIAGNOSIS — Z51.81 ENCOUNTER FOR MONITORING COUMADIN THERAPY: Primary | ICD-10-CM

## 2021-12-09 DIAGNOSIS — Z00.00 MEDICARE ANNUAL WELLNESS VISIT, SUBSEQUENT: ICD-10-CM

## 2021-12-09 LAB
INR BLD: 2.7
PT POC: 32.7 SECONDS
VALID INTERNAL CONTROL?: YES

## 2021-12-09 PROCEDURE — 3017F COLORECTAL CA SCREEN DOC REV: CPT | Performed by: FAMILY MEDICINE

## 2021-12-09 PROCEDURE — 99214 OFFICE O/P EST MOD 30 MIN: CPT | Performed by: FAMILY MEDICINE

## 2021-12-09 PROCEDURE — G0439 PPPS, SUBSEQ VISIT: HCPCS | Performed by: FAMILY MEDICINE

## 2021-12-09 PROCEDURE — 85610 PROTHROMBIN TIME: CPT | Performed by: FAMILY MEDICINE

## 2021-12-09 PROCEDURE — G8427 DOCREV CUR MEDS BY ELIG CLIN: HCPCS | Performed by: FAMILY MEDICINE

## 2021-12-09 PROCEDURE — G0463 HOSPITAL OUTPT CLINIC VISIT: HCPCS | Performed by: FAMILY MEDICINE

## 2021-12-09 PROCEDURE — G9899 SCRN MAM PERF RSLTS DOC: HCPCS | Performed by: FAMILY MEDICINE

## 2021-12-09 PROCEDURE — G8417 CALC BMI ABV UP PARAM F/U: HCPCS | Performed by: FAMILY MEDICINE

## 2021-12-09 PROCEDURE — G8510 SCR DEP NEG, NO PLAN REQD: HCPCS | Performed by: FAMILY MEDICINE

## 2021-12-09 PROCEDURE — G9231 DOC ESRD DIA TRANS PREG: HCPCS | Performed by: FAMILY MEDICINE

## 2021-12-09 RX ORDER — PANTOPRAZOLE SODIUM 40 MG/1
40 TABLET, DELAYED RELEASE ORAL DAILY
COMMUNITY
End: 2021-12-15 | Stop reason: SDUPTHER

## 2021-12-09 RX ORDER — DOXYCYCLINE 100 MG/1
100 TABLET ORAL 2 TIMES DAILY
Qty: 20 TABLET | Refills: 0 | Status: SHIPPED | OUTPATIENT
Start: 2021-12-09 | End: 2021-12-19

## 2021-12-09 RX ORDER — OXYCODONE AND ACETAMINOPHEN 10; 325 MG/1; MG/1
1 TABLET ORAL
Qty: 90 TABLET | Refills: 0 | Status: SHIPPED | OUTPATIENT
Start: 2021-12-09 | End: 2022-05-05 | Stop reason: SDUPTHER

## 2021-12-09 RX ORDER — OXYCODONE AND ACETAMINOPHEN 10; 325 MG/1; MG/1
1 TABLET ORAL
Qty: 90 TABLET | Refills: 0 | Status: SHIPPED | OUTPATIENT
Start: 2022-01-08 | End: 2022-05-05 | Stop reason: SDUPTHER

## 2021-12-09 RX ORDER — OXYCODONE AND ACETAMINOPHEN 10; 325 MG/1; MG/1
1 TABLET ORAL
Qty: 90 TABLET | Refills: 0 | Status: SHIPPED | OUTPATIENT
Start: 2022-02-07 | End: 2022-04-08 | Stop reason: SDUPTHER

## 2021-12-09 NOTE — PROGRESS NOTES
Progress Note    she is a 63y.o. year old female who presents for evalution. Subjective:     Pt here for INR check and is currently therapeutic at 2.7. No issues with bleeding. 2.5 2 days a week and 3mg 5 days a week  She is seeing GI next week about hr stomach issues. We will also request a copy of her colonoscopy. Pt states her sinuses have been bothering her. Increased sinus drainage with brown mucous. No fever or chills. Was using mucinex. Patient needs a refill of her Percocet 10/325 she states this is working better make the pain more tolerable but is still severely limited by her pain. Patient is chronic chest pain from pulmonary embolisms and coronary artery disease. Chronic knee pain she is going to require revision of her right knee since the replacement has failed and plans to see her orthopedic soon due to this decrease in quality of life. Chronic left shoulder pain as well. Chronic low back pain chronic neck pain. She does also use Flexeril which helps she takes at night. Uses Voltaren gel as needed which provides minimal relief. No history of abuse or misuse of medication. Will bring her pain to about a 3 or 4 from an 8 out of 10. Helps with quality life. Activities limited due to failed right knee replacement however she does remain as active as she can.     Opioid/Pain Management:     1. Has the patient signed a pain contract for chronic narcotic use? yes  2. Has the patient had a UDS or Serum screen as per guidelines as per [facility]?:   yes blood drawn today    3. Does patient meet necessary guidelines for Naloxone treatement per the [facility]?:    no  4. Has the Prescription Monitoring Program been reviewed? yes  5. Does patient have a long term condition that requires long term use of a Narcotic?  yes  6. Has patient been tolerant of therapy and responsible to routine follow up and specialist follow-up?   Yes  Reviewed PmHx, RxHx, FmHx, SocHx, AllgHx and updated and dated in the chart. Review of Systems - negative except as listed above in the HPI    Objective:     Vitals:    12/09/21 1407   BP: 112/64   Pulse: 80   Resp: 16   Temp: 98 °F (36.7 °C)   TempSrc: Oral   SpO2: 92%   Weight: 291 lb 0.1 oz (132 kg)   Height: 5' 10\" (1.778 m)       Current Outpatient Medications   Medication Sig    CALCITRIOL PO Take  by mouth.  pantoprazole (Protonix) 40 mg tablet Take 40 mg by mouth daily.  doxycycline (ADOXA) 100 mg tablet Take 1 Tablet by mouth two (2) times a day for 10 days.  [START ON 2/7/2022] oxyCODONE-acetaminophen (PERCOCET 10)  mg per tablet Take 1 Tablet by mouth every eight (8) hours as needed for Pain for up to 30 days. Max Daily Amount: 3 Tablets.  [START ON 1/8/2022] oxyCODONE-acetaminophen (PERCOCET 10)  mg per tablet Take 1 Tablet by mouth every eight (8) hours as needed for Pain for up to 30 days. Max Daily Amount: 3 Tablets.  oxyCODONE-acetaminophen (PERCOCET 10)  mg per tablet Take 1 Tablet by mouth every eight (8) hours as needed for Pain for up to 30 days. Max Daily Amount: 3 Tablets.  atorvastatin (LIPITOR) 80 mg tablet TAKE 1 TABLET BY MOUTH ONCE DAILY NIGHTLY    sucralfate (CARAFATE) 1 gram tablet Take 1 tablet by mouth 4 times daily    carvediloL (COREG) 25 mg tablet TAKE 1 TABLET BY MOUTH TWICE DAILY WITH MEALS    warfarin (COUMADIN) 2.5 mg tablet 1 tab PO 2 days per week.  warfarin (COUMADIN) 3 mg tablet 1 tab PO qday x 5 days a week    triamcinolone acetonide (KENALOG) 0.1 % topical cream Apply  to affected area two (2) times a day. use thin layer    cyclobenzaprine (FLEXERIL) 5 mg tablet Take 1 Tablet by mouth nightly as needed for Muscle Spasm(s).  ergocalciferol (ERGOCALCIFEROL) 1,250 mcg (50,000 unit) capsule TAKE 1 CAPSULE BY MOUTH EVERY TUESDAY    mupirocin (BACTROBAN) 2 % ointment Apply  to affected area two (2) times a day.     allopurinoL (ZYLOPRIM) 100 mg tablet Take 1 tablet by mouth once daily    isosorbide mononitrate ER (IMDUR) 120 mg CR tablet 1 tab PO qday (Patient taking differently: 1/2 tablet)    docusate sodium (STOOL SOFTENER PO) Take  by mouth daily.  nitroglycerin (NITROSTAT) 0.4 mg SL tablet 1 Tab by SubLINGual route every five (5) minutes as needed for Chest Pain. Up to 3 doses.  naloxone (NARCAN) 4 mg/actuation nasal spray Use 1 spray intranasally, then discard. Repeat with new spray every 2 min as needed for opioid overdose symptoms, alternating nostrils.  albuterol (PROAIR HFA) 90 mcg/actuation inhaler Take 2 Puffs by inhalation every four (4) hours as needed for Wheezing.  Oxygen O2 2L NC 24/7    esomeprazole (NexIUM) 40 mg capsule Take 1 Capsule by mouth two (2) times a day. (Patient not taking: Reported on 12/9/2021)    amLODIPine (NORVASC) 10 mg tablet TAKE 1 TABLET BY MOUTH ONCE DAILY FOR HIGH BLOOD PRESSURE (Patient not taking: Reported on 11/5/2021)    predniSONE (STERAPRED DS) 10 mg dose pack See administration instruction per 10mg dose pack (Patient not taking: Reported on 7/8/2021)    levocetirizine (XYZAL) 5 mg tablet Take 1 Tab by mouth daily. (Patient not taking: Reported on 11/5/2021)     No current facility-administered medications for this visit. Physical Examination: General appearance - alert, well appearing, and in no distress  Nose - mucosal congestion  Chest - clear to auscultation, no wheezes, rales or rhonchi, symmetric air entry  Heart - normal rate, regular rhythm, normal S1, S2, no murmurs, rubs, clicks or gallops      Assessment/ Plan:   Diagnoses and all orders for this visit:    1. Encounter for monitoring Coumadin therapy  -     AMB POC PT/INR  INR at goal continue current dosing follow-up 1 month for recheck. 2. Medicare annual wellness visit, subsequent    3. Acute non-recurrent frontal sinusitis  -     doxycycline (ADOXA) 100 mg tablet;  Take 1 Tablet by mouth two (2) times a day for 10 days.    4. Chronic chest pain  -     oxyCODONE-acetaminophen (PERCOCET 10)  mg per tablet; Take 1 Tablet by mouth every eight (8) hours as needed for Pain for up to 30 days. Max Daily Amount: 3 Tablets. -     oxyCODONE-acetaminophen (PERCOCET 10)  mg per tablet; Take 1 Tablet by mouth every eight (8) hours as needed for Pain for up to 30 days. Max Daily Amount: 3 Tablets. -     oxyCODONE-acetaminophen (PERCOCET 10)  mg per tablet; Take 1 Tablet by mouth every eight (8) hours as needed for Pain for up to 30 days. Max Daily Amount: 3 Tablets. 5. Chronic bilateral low back pain, unspecified whether sciatica present  -     oxyCODONE-acetaminophen (PERCOCET 10)  mg per tablet; Take 1 Tablet by mouth every eight (8) hours as needed for Pain for up to 30 days. Max Daily Amount: 3 Tablets. -     oxyCODONE-acetaminophen (PERCOCET 10)  mg per tablet; Take 1 Tablet by mouth every eight (8) hours as needed for Pain for up to 30 days. Max Daily Amount: 3 Tablets. -     oxyCODONE-acetaminophen (PERCOCET 10)  mg per tablet; Take 1 Tablet by mouth every eight (8) hours as needed for Pain for up to 30 days. Max Daily Amount: 3 Tablets. 6. Chronic pain of right knee  -     oxyCODONE-acetaminophen (PERCOCET 10)  mg per tablet; Take 1 Tablet by mouth every eight (8) hours as needed for Pain for up to 30 days. Max Daily Amount: 3 Tablets. -     oxyCODONE-acetaminophen (PERCOCET 10)  mg per tablet; Take 1 Tablet by mouth every eight (8) hours as needed for Pain for up to 30 days. Max Daily Amount: 3 Tablets. -     oxyCODONE-acetaminophen (PERCOCET 10)  mg per tablet; Take 1 Tablet by mouth every eight (8) hours as needed for Pain for up to 30 days. Max Daily Amount: 3 Tablets.     7. Medication monitoring encounter  -     DRUG SCREEN-10 W/CONFIRM, SERUM; Future       Follow-up and Dispositions    · Return in about 4 weeks (around 1/6/2022), or if symptoms worsen or fail to improve. I have discussed the diagnosis with the patient and the intended plan as seen in the above orders. The patient has received an after-visit summary and questions were answered concerning future plans. Pt conveyed understanding of plan. Medication Side Effects and Warnings were discussed with patient    An electronic signature was used to authenticate this note  Verner Bunkers, DO    This is the Subsequent Medicare Annual Wellness Exam, performed 12 months or more after the Initial AWV or the last Subsequent AWV    I have reviewed the patient's medical history in detail and updated the computerized patient record. Assessment/Plan   Education and counseling provided:  Are appropriate based on today's review and evaluation    1. Encounter for monitoring Coumadin therapy  -     AMB POC PT/INR  2. Medicare annual wellness visit, subsequent  3. Acute non-recurrent frontal sinusitis  -     doxycycline (ADOXA) 100 mg tablet; Take 1 Tablet by mouth two (2) times a day for 10 days. , Normal, Disp-20 Tablet, R-0  4. Chronic chest pain  -     oxyCODONE-acetaminophen (PERCOCET 10)  mg per tablet; Take 1 Tablet by mouth every eight (8) hours as needed for Pain for up to 30 days. Max Daily Amount: 3 Tablets., Normal, Disp-90 Tablet, R-0  -     oxyCODONE-acetaminophen (PERCOCET 10)  mg per tablet; Take 1 Tablet by mouth every eight (8) hours as needed for Pain for up to 30 days. Max Daily Amount: 3 Tablets., Normal, Disp-90 Tablet, R-0  -     oxyCODONE-acetaminophen (PERCOCET 10)  mg per tablet; Take 1 Tablet by mouth every eight (8) hours as needed for Pain for up to 30 days. Max Daily Amount: 3 Tablets., Normal, Disp-90 Tablet, R-0  5. Chronic bilateral low back pain, unspecified whether sciatica present  -     oxyCODONE-acetaminophen (PERCOCET 10)  mg per tablet; Take 1 Tablet by mouth every eight (8) hours as needed for Pain for up to 30 days. Max Daily Amount: 3 Tablets. , Normal, Disp-90 Tablet, R-0  -     oxyCODONE-acetaminophen (PERCOCET 10)  mg per tablet; Take 1 Tablet by mouth every eight (8) hours as needed for Pain for up to 30 days. Max Daily Amount: 3 Tablets., Normal, Disp-90 Tablet, R-0  -     oxyCODONE-acetaminophen (PERCOCET 10)  mg per tablet; Take 1 Tablet by mouth every eight (8) hours as needed for Pain for up to 30 days. Max Daily Amount: 3 Tablets., Normal, Disp-90 Tablet, R-0  6. Chronic pain of right knee  -     oxyCODONE-acetaminophen (PERCOCET 10)  mg per tablet; Take 1 Tablet by mouth every eight (8) hours as needed for Pain for up to 30 days. Max Daily Amount: 3 Tablets., Normal, Disp-90 Tablet, R-0  -     oxyCODONE-acetaminophen (PERCOCET 10)  mg per tablet; Take 1 Tablet by mouth every eight (8) hours as needed for Pain for up to 30 days. Max Daily Amount: 3 Tablets., Normal, Disp-90 Tablet, R-0  -     oxyCODONE-acetaminophen (PERCOCET 10)  mg per tablet; Take 1 Tablet by mouth every eight (8) hours as needed for Pain for up to 30 days. Max Daily Amount: 3 Tablets., Normal, Disp-90 Tablet, R-0  7. Medication monitoring encounter  -     DRUG SCREEN-10 W/CONFIRM, SERUM; Future       Depression Risk Factor Screening     3 most recent PHQ Screens 12/9/2021   Little interest or pleasure in doing things Not at all   Feeling down, depressed, irritable, or hopeless Not at all   Total Score PHQ 2 0       Alcohol Risk Screen    Do you average more than 1 drink per night or more than 7 drinks a week:  No    On any one occasion in the past three months have you have had more than 3 drinks containing alcohol:  No        Functional Ability and Level of Safety    Hearing: Hearing is good. Activities of Daily Living: The home contains: no safety equipment. Patient needs help with:  transportation and housework      Ambulation: uses electric scooter     Fall Risk:  Fall Risk Assessment, last 12 mths 2/17/2021   Able to walk?  Yes   Fall in past 12 months? 0   Do you feel unsteady?  0   Are you worried about falling 0      Abuse Screen:  Patient is not abused       Cognitive Screening    Has your family/caregiver stated any concerns about your memory: no         Health Maintenance Due     Health Maintenance Due   Topic Date Due    Pneumococcal 0-64 years (1 of 4 - PCV13) Never done    DTaP/Tdap/Td series (1 - Tdap) Never done    Shingrix Vaccine Age 50> (1 of 2) Never done    Low dose CT lung screening  Never done    Eye Exam Retinal or Dilated  02/28/2021    Colorectal Cancer Screening Combo  06/24/2021    Foot Exam Q1  09/15/2021       Patient Care Team   Patient Care Team:  Keyana Holguin DO as PCP - General (Family Medicine)  Keyana Holguin DO as PCP - [address] [place] [address] [facility]  Isela Gauthier MD (Endocrinology)  Edwinna Sandifer, MD (Orthopedic Surgery)  Yfn Downey MD as Consulting Provider (Cardiology)  Alethea Gonzales MD as Consulting Provider (Nephrology)  Vessie Dakins, DO as Consulting Provider (Pulmonary Disease)  Kristen Corrigan MD as Consulting Provider (Vascular Surgery)  Mitch Norton MD as Physician (Cardiology)    History     Patient Active Problem List   Diagnosis Code    Coronary artery disease I25.10    JASEN on CPAP G47.33, Z99.89    Pulmonary hypertension ([address] I27.20    HTN (hypertension) I10    Morbid obesity E66.01    Gastroparesis K31.84    Normocytic anemia D64.9    DM (diabetes mellitus), type 2 with renal complications ([address] A30.76    ILD (interstitial lung disease) ([address] J84.9    Warfarin anticoagulation Z79.01    S/P left pulmonary artery pressure sensor implant placement Z95.9    Hx of deep venous thrombosis Z86.718    Sleep apnea G47.30    Diabetic gastroparesis (HCC) E11.43, K31.84    GERD (gastroesophageal reflux disease) K21.9    DM type 2 causing neurological disease ([address] E11.49    DM type 2 causing renal disease ([address] E11.29    DM type 2 causing vascular disease ([address] E11.59    Gout M10.9    Limited mobility Z74.09    ESRD on hemodialysis ([facility]) N18.6, Z99.2    (HFpEF) heart failure with preserved ejection fraction ([facility]) I50.30    Nephrolithiasis N20.0    PVC (premature ventricular contraction) I49.3    S/P ORIF (open reduction internal fixation) fracture Z98.890, Z87.81    ESRD (end stage renal disease) ([facility]) N18.6    Chronic anticoagulation Z79.01    Arthralgia M25.50    Coronary stent patent Z95.5    Edema of both lower extremities R60.0    Fatigue R53.83    Hematuria R31.9    Hypervolemia E87.70    End stage renal failure on dialysis ([facility]) N18.6, Z99.2    Flank pain R10.9    Hypoxia R09.02    Infection of total right knee replacement ([facility]) T84.53XA     Past Medical History:   Diagnosis Date     Sleep Apnea 2/16/2011    Compliant with c-pap.  Aortic aneurysm ([facility])     Abdominal    CAD (coronary Artery Disease) Native Artery 2/16/2011    Chronic kidney disease     Hemodiaylsis  3x/week    CKD (chronic kidney disease) stage 4, GFR 15-29 ml/min ([facility]) 2/10/2017    Coagulation disorder ([address] 06/2018    Anemia  Last blood Transfusion    COPD (chronic obstructive pulmonary disease) ([facility])     Diabetic gastroparesis ([facility])     Diastolic heart failure ([address] 10/5/2012    DM type 2 causing neurological disease ([address]     DM type 2 causing renal disease ([facility])     Insulin SS at night only PRN    DM type 2 causing vascular disease ([address]     Esophageal stricture 2012    dialted Dr. Elizabeth Foster G6PD deficiency     trait    GERD (gastroesophageal reflux disease)     Gout     Hemodialysis access, AV graft ([address] 04/02/2017    Dialysis MWF    [address]      Hypertension     ILD (interstitial lung disease) ([address]     open lung bx CJW 2010    Infected dental caries 3/17/2020    Loss of appetite 3/17/2020    Morbid obesity (HCC)     Nausea and vomiting 3/17/2020    OA (osteoarthritis)     Ovarian cancer ([address]     cervical and uterine    [facility]     S/P left pulmonary artery pressure sensor implant placement 8/31/2017    [facility]     Thromboembolus ([facility]     Right calf     Tobacco use disorder 3/21/2012    Uterine cervix cancer ([facility]     Vitamin D deficiency 8/9/2013      Past Surgical History:   Procedure Laterality Date    COLONOSCOPY N/A 6/24/2016    COLONOSCOPY performed by Benny Quiros MD at [facility] HX ADENOIDECTOMY      HX APPENDECTOMY      HX CARPAL TUNNEL RELEASE      bilateral    HX CHOLECYSTECTOMY      HX HEART CATHETERIZATION      x 7    HX HERNIA REPAIR      HX HYSTERECTOMY      HX ORTHOPAEDIC Right 11/12/12    right knee replacement    HX ORTHOPAEDIC Bilateral     carpal tunnel repair    HX SALPINGO-OOPHORECTOMY      HX TONSIL AND ADENOIDECTOMY      HX TONSILLECTOMY      HX VASCULAR ACCESS Left     Left lower arm AV fistula    ID CARDIAC SURG PROCEDURE UNLIST  02/2019    x4 with last sttent placed 2/2019.  ID CHEST SURGERY PROCEDURE UNLISTED Right     > 20 years ago  RLL removed     UPPER GI ENDOSCOPY,VINOD W GUIDE  6/24/2016          Current Outpatient Medications   Medication Sig Dispense Refill    CALCITRIOL PO Take  by mouth.  pantoprazole (Protonix) 40 mg tablet Take 40 mg by mouth daily.  doxycycline (ADOXA) 100 mg tablet Take 1 Tablet by mouth two (2) times a day for 10 days. 20 Tablet 0    [START ON 2/7/2022] oxyCODONE-acetaminophen (PERCOCET 10)  mg per tablet Take 1 Tablet by mouth every eight (8) hours as needed for Pain for up to 30 days. Max Daily Amount: 3 Tablets. 90 Tablet 0    [START ON 1/8/2022] oxyCODONE-acetaminophen (PERCOCET 10)  mg per tablet Take 1 Tablet by mouth every eight (8) hours as needed for Pain for up to 30 days. Max Daily Amount: 3 Tablets. 90 Tablet 0    oxyCODONE-acetaminophen (PERCOCET 10)  mg per tablet Take 1 Tablet by mouth every eight (8) hours as needed for Pain for up to 30 days. Max Daily Amount: 3 Tablets.  80 Tablet 0    atorvastatin (LIPITOR) 80 mg tablet TAKE 1 TABLET BY MOUTH ONCE DAILY NIGHTLY 90 Tablet 3    sucralfate (CARAFATE) 1 gram tablet Take 1 tablet by mouth 4 times daily 120 Tablet 5    carvediloL (COREG) 25 mg tablet TAKE 1 TABLET BY MOUTH TWICE DAILY WITH MEALS 180 Tablet 0    warfarin (COUMADIN) 2.5 mg tablet 1 tab PO 2 days per week. 30 Tablet 5    warfarin (COUMADIN) 3 mg tablet 1 tab PO qday x 5 days a week 80 Tablet 4    triamcinolone acetonide (KENALOG) 0.1 % topical cream Apply  to affected area two (2) times a day. use thin layer 80 g 0    cyclobenzaprine (FLEXERIL) 5 mg tablet Take 1 Tablet by mouth nightly as needed for Muscle Spasm(s). 90 Tablet 4    ergocalciferol (ERGOCALCIFEROL) 1,250 mcg (50,000 unit) capsule TAKE 1 CAPSULE BY MOUTH EVERY TUESDAY 12 Capsule 0    mupirocin (BACTROBAN) 2 % ointment Apply  to affected area two (2) times a day. 30 g 1    allopurinoL (ZYLOPRIM) 100 mg tablet Take 1 tablet by mouth once daily 90 Tab 4    isosorbide mononitrate ER (IMDUR) 120 mg CR tablet 1 tab PO qday (Patient taking differently: 1/2 tablet) 90 Tab 3    docusate sodium (STOOL SOFTENER PO) Take  by mouth daily.  nitroglycerin (NITROSTAT) 0.4 mg SL tablet 1 Tab by SubLINGual route every five (5) minutes as needed for Chest Pain. Up to 3 doses. 1 Bottle 1    naloxone (NARCAN) 4 mg/actuation nasal spray Use 1 spray intranasally, then discard. Repeat with new spray every 2 min as needed for opioid overdose symptoms, alternating nostrils. 1 Each 5    albuterol (PROAIR HFA) 90 mcg/actuation inhaler Take 2 Puffs by inhalation every four (4) hours as needed for Wheezing. 1 Inhaler 5    Oxygen O2 2L NC 24/7      esomeprazole (NexIUM) 40 mg capsule Take 1 Capsule by mouth two (2) times a day.  (Patient not taking: Reported on 12/9/2021) 60 Capsule 5    amLODIPine (NORVASC) 10 mg tablet TAKE 1 TABLET BY MOUTH ONCE DAILY FOR HIGH BLOOD PRESSURE (Patient not taking: Reported on 11/5/2021) 90 Tab 4    predniSONE (STERAPRED DS) 10 mg dose pack See administration instruction per 10mg dose pack (Patient not taking: Reported on 2021) 21 Tab 0    levocetirizine (XYZAL) 5 mg tablet Take 1 Tab by mouth daily.  (Patient not taking: Reported on 2021) 30 Tab 5     Allergies   Allergen Reactions    Contrast Dye [Iodine] Anaphylaxis     Tolerates when pre medicated w/ IV solumedrol and benadryl prior to procedure     Levaquin [Levofloxacin] Nausea and Vomiting    Morphine Hives and Itching       Family History   Problem Relation Age of Onset    Heart Disease Mother     Heart Disease Brother      Social History     Tobacco Use    Smoking status: Former Smoker     Packs/day: 0.50     Years: 41.00     Pack years: 20.50     Types: Cigarettes     Quit date: 2014     Years since quittin.0    Smokeless tobacco: Never Used   Substance Use Topics    Alcohol use: No         Hallie Levine, DO

## 2021-12-09 NOTE — PROGRESS NOTES
Chief Complaint   Patient presents with    Follow-up     INR     Patient presents in office today for INR check. Has c/o congestion. No other concerns. 1. Have you been to the ER, urgent care clinic since your last visit? Hospitalized since your last visit? No    2. Have you seen or consulted any other health care providers outside of the [address] [place] [address] since your last visit? Include any pap smears or colon screening.  No    Learning Assessment 6/28/2019   PRIMARY LEARNER Patient   PRIMARY LANGUAGE ENGLISH   LEARNER PREFERENCE PRIMARY LISTENING   ANSWERED BY patient    RELATIONSHIP SELF

## 2021-12-09 NOTE — PATIENT INSTRUCTIONS
Medicare Wellness Visit, Female     The best way to live healthy is to have a lifestyle where you eat a well-balanced diet, exercise regularly, limit alcohol use, and quit all forms of tobacco/nicotine, if applicable. Regular preventive services are another way to keep healthy. Preventive services (vaccines, screening tests, monitoring & exams) can help personalize your care plan, which helps you manage your own care. Screening tests can find health problems at the earliest stages, when they are easiest to treat. [facility] follows the current, evidence-based guidelines published by the [facility] ([facility]) when recommending preventive services for our patients. Because we follow these guidelines, sometimes recommendations change over time as research supports it. (For example, mammograms used to be recommended annually. Even though Medicare will still pay for an annual mammogram, the newer guidelines recommend a mammogram every two years for women of average risk). Of course, you and your doctor may decide to screen more often for some diseases, based on your risk and your co-morbidities (chronic disease you are already diagnosed with). Preventive services for you include:  - Medicare offers their members a free annual wellness visit, which is time for you and your primary care provider to discuss and plan for your preventive service needs. Take advantage of this benefit every year!  -All adults over the age of 72 should receive the recommended pneumonia vaccines. Current USPSTF guidelines recommend a series of two vaccines for the best pneumonia protection.   -All adults should have a flu vaccine yearly and a tetanus vaccine every 10 years.   -All adults age 48 and older should receive the shingles vaccines (series of two vaccines).       -All adults age 38-68 who are overweight should have a diabetes screening test once every three years.   -All adults born between [birth year] and 1965 should be screened once for Hepatitis C.  -Other screening tests and preventive services for persons with diabetes include: an eye exam to screen for diabetic retinopathy, a kidney function test, a foot exam, and stricter control over your cholesterol.   -Cardiovascular screening for adults with routine risk involves an electrocardiogram (ECG) at intervals determined by your doctor.   -Colorectal cancer screenings should be done for adults age 54-65 with no increased risk factors for colorectal cancer. There are a number of acceptable methods of screening for this type of cancer. Each test has its own benefits and drawbacks. Discuss with your doctor what is most appropriate for you during your annual wellness visit. The different tests include: colonoscopy (considered the best screening method), a fecal occult blood test, a fecal DNA test, and sigmoidoscopy.    -A bone mass density test is recommended when a woman turns 65 to screen for osteoporosis. This test is only recommended one time, as a screening. Some providers will use this same test as a disease monitoring tool if you already have osteoporosis. -Breast cancer screenings are recommended every other year for women of normal risk, age 54-69.  -Cervical cancer screenings for women over age 72 are only recommended with certain risk factors.      Here is a list of your current Health Maintenance items (your personalized list of preventive services) with a due date:  Health Maintenance Due   Topic Date Due    Pneumococcal Vaccine (1 of 4 - PCV13) Never done    DTaP/Tdap/Td  (1 - Tdap) Never done    Shingles Vaccine (1 of 2) Never done    Smoker or Former [address]  Never done    Eye Exam  02/28/2021    Colorectal Screening  06/24/2021    Diabetic Foot Care  09/15/2021    COVID-19 Vaccine (3 - Booster for Vicky Corrie series) 10/05/2021

## 2021-12-15 RX ORDER — PANTOPRAZOLE SODIUM 40 MG/1
40 TABLET, DELAYED RELEASE ORAL DAILY
Qty: 90 TABLET | Refills: 5 | Status: SHIPPED | OUTPATIENT
Start: 2021-12-15 | End: 2022-06-30

## 2021-12-22 LAB
AMPHETAMINES SERPL QL SCN: NEGATIVE NG/ML
BARBITURATES SERPL QL SCN: NEGATIVE UG/ML
BENZODIAZ SERPL QL SCN: NEGATIVE NG/ML
CANNABINOIDS SERPL QL SCN: NEGATIVE NG/ML
COCAINE+BZE SERPL QL SCN: NEGATIVE NG/ML
METHADONE SERPL QL SCN: NEGATIVE NG/ML
OPIATES SERPL QL SCN: NEGATIVE NG/ML
OXYCOCONE: 9.1 NG/ML
OXYCODONES CONFIRMATION, 738393: POSITIVE
OXYCODONES, 738315: ABNORMAL NG/ML
OXYMORPHONE, 763902: NEGATIVE NG/ML
PCP SERPL QL SCN: NEGATIVE NG/ML
PROPOXYPH SERPL QL SCN: NEGATIVE NG/ML

## 2022-02-08 ENCOUNTER — OFFICE VISIT (OUTPATIENT)
Dept: FAMILY MEDICINE CLINIC | Age: 67
End: 2022-02-08
Payer: MEDICARE

## 2022-02-08 VITALS
TEMPERATURE: 98.8 F | HEIGHT: 70 IN | DIASTOLIC BLOOD PRESSURE: 66 MMHG | RESPIRATION RATE: 16 BRPM | BODY MASS INDEX: 41.76 KG/M2 | OXYGEN SATURATION: 98 % | HEART RATE: 72 BPM | SYSTOLIC BLOOD PRESSURE: 110 MMHG

## 2022-02-08 DIAGNOSIS — Z51.81 ENCOUNTER FOR MONITORING COUMADIN THERAPY: ICD-10-CM

## 2022-02-08 DIAGNOSIS — N18.6 END STAGE RENAL FAILURE ON DIALYSIS (HCC): ICD-10-CM

## 2022-02-08 DIAGNOSIS — Z79.01 ENCOUNTER FOR MONITORING COUMADIN THERAPY: ICD-10-CM

## 2022-02-08 DIAGNOSIS — I26.99 OTHER PULMONARY EMBOLISM WITHOUT ACUTE COR PULMONALE, UNSPECIFIED CHRONICITY (HCC): Primary | ICD-10-CM

## 2022-02-08 DIAGNOSIS — Z99.2 END STAGE RENAL FAILURE ON DIALYSIS (HCC): ICD-10-CM

## 2022-02-08 DIAGNOSIS — R79.1 SUPRATHERAPEUTIC INR: ICD-10-CM

## 2022-02-08 LAB
INR BLD: 6.3
INR BLD: 6.5
PT POC: 76 SECONDS
PT POC: 77.5 SECONDS
VALID INTERNAL CONTROL?: YES
VALID INTERNAL CONTROL?: YES

## 2022-02-08 PROCEDURE — G8432 DEP SCR NOT DOC, RNG: HCPCS | Performed by: NURSE PRACTITIONER

## 2022-02-08 PROCEDURE — G8417 CALC BMI ABV UP PARAM F/U: HCPCS | Performed by: NURSE PRACTITIONER

## 2022-02-08 PROCEDURE — 3017F COLORECTAL CA SCREEN DOC REV: CPT | Performed by: NURSE PRACTITIONER

## 2022-02-08 PROCEDURE — G9231 DOC ESRD DIA TRANS PREG: HCPCS | Performed by: NURSE PRACTITIONER

## 2022-02-08 PROCEDURE — 99214 OFFICE O/P EST MOD 30 MIN: CPT | Performed by: NURSE PRACTITIONER

## 2022-02-08 PROCEDURE — G0463 HOSPITAL OUTPT CLINIC VISIT: HCPCS | Performed by: NURSE PRACTITIONER

## 2022-02-08 PROCEDURE — G8427 DOCREV CUR MEDS BY ELIG CLIN: HCPCS | Performed by: NURSE PRACTITIONER

## 2022-02-08 PROCEDURE — 85610 PROTHROMBIN TIME: CPT | Performed by: NURSE PRACTITIONER

## 2022-02-08 NOTE — PATIENT INSTRUCTIONS
Hold warfarin today and tomorrow. Return on Thursday for repeat INR. Taking Warfarin Safely: Care Instructions  Your Care Instructions     Warfarin is a medicine that you take to prevent blood clots. It is often called a blood thinner. Doctors give warfarin (such as Coumadin) to reduce the risk of blood clots. You may be at risk for blood clots if you have atrial fibrillation or deep vein thrombosis. Some other health problems may also put you at risk. Warfarin slows the amount of time it takes for your blood to clot. It can cause bleeding problems. Even if you've been taking warfarin for a while, it's important to know how to take it safely. Foods and other medicines can affect the way warfarin works. Some can make warfarin work too well. This can cause bleeding problems. And some can make it work poorly, so that it does not prevent blood clots very well. You will need regular blood tests to check how long it takes for your blood to form a clot. This test is called a PT or prothrombin time test. The result of the test is called an INR level. Depending on the test results, your doctor or anticoagulation clinic may adjust your dose of warfarin. Follow-up care is a key part of your treatment and safety. Be sure to make and go to all appointments, and call your doctor if you are having problems. It's also a good idea to know your test results and keep a list of the medicines you take. How can you care for yourself at home? Take warfarin safely  · Take your warfarin at the same time each day. · If you miss a dose of warfarin, don't take an extra dose to make up for it. Your doctor can tell you exactly what to do so you don't take too much or too little. · Wear medical alert jewelry that lets others know that you take warfarin. You can buy this at most drugstores. · Don't take warfarin if you are pregnant or planning to get pregnant.  Talk to your doctor about how you can prevent getting pregnant while you are taking it. · Don't change your dose or stop taking warfarin unless your doctor tells you to. Effects of medicines and food on warfarin  · Don't start or stop taking any medicines, vitamins, or natural remedies unless you first talk to your doctor. Many medicines can affect how warfarin works. These include aspirin and other pain relievers, over-the-counter medicines, multivitamins, dietary supplements, and herbal products. · Tell all of your doctors and pharmacists that you take warfarin. Some prescription medicines can affect how warfarin works. · Keep the amount of vitamin K in your diet about the same from day to day. Do not suddenly eat a lot more or a lot less food that is rich in vitamin K than you usually do. Vitamin K affects how warfarin works and how your blood clots. Talk with your doctor before making big changes in your diet. Foods that have a lot of vitamin K include cooked green vegetables, such as:  ? Kale, spinach, turnip greens, tl greens, Swiss chard, and mustard greens. ? [place] sprouts, broccoli, and cabbage. · Limit your use of alcohol. Avoid bleeding by preventing falls and injuries  · Wear slippers or shoes with nonskid soles. · Remove throw rugs and clutter. · Rearrange furniture and electrical cords to keep them out of walking paths. · Keep stairways, porches, and outside walkways well lit. Use night-lights in hallways and bathrooms. · Be extra careful when you work with sharp tools or knives. When should you call for help? Call 911 anytime you think you may need emergency care. For example, call if:    · You have a sudden, severe headache that is different from past headaches. Call your doctor now or seek immediate medical care if:    · You have any abnormal bleeding, such as:  ? Nosebleeds. ? Vaginal bleeding that is different (heavier, more frequent, at a different time of the month) than what you are used to.  ?  Bloody or black stools, or rectal bleeding. ? Bloody or pink urine. Watch closely for changes in your health, and be sure to contact your doctor if you have any problems. Where can you learn more? Go to http://www.gray.com/  Enter N135 in the search box to learn more about \"Taking Warfarin Safely: Care Instructions. \"  Current as of: April 29, 2021               Content Version: 13.0  © 8068-5387 [employer]. Care instructions adapted under license by [employer] (which disclaims liability or warranty for this information). If you have questions about a medical condition or this instruction, always ask your healthcare professional. [place] [address] any warranty or liability for your use of this information.

## 2022-02-09 NOTE — PROGRESS NOTES
Rona Heredia (: 1955) is a 61 y.o. female, established patient, here for evaluation of the following chief complaint(s):  Coagulation disorder       ASSESSMENT/PLAN:  Below is the assessment and plan developed based on review of pertinent history, physical exam, labs, studies, and medications. 1. Other pulmonary embolism without acute cor pulmonale, unspecified chronicity ([address]  2. Encounter for monitoring Coumadin therapy  3. Supratherapeutic INR  INR initial value 6.3 today, repeated to confirm with result of 6.5  No acute bleeding noted  Hold warfarin today and tomorrow  Follow-up with Dr. Melisa Arias on Thursday for repeat INR  -     AMB POC PT/INR     4. End stage renal failure on dialysis [facility])  Management per specialist, no acute symptoms warranting change in plan today  Patient to advise dialysis staff of elevated INR    Return in about 2 days (around 2/10/2022), or if symptoms worsen or fail to improve, for INR. I have discussed the diagnosis with the patient and the intended plan as seen in the above orders. The patient has received an after-visit summary and questions were answered concerning future plans. Patient conveyed understanding of the plan at the time of the visit. SUBJECTIVE/OBJECTIVE:  Presents for PT/INR. Accompanied by her  today. Patient presents for routine INR check. She is on long-term warfarin therapy due to history of recurrent pulmonary embolus. Reports good compliance with dosing schedule, currently 2.5 mg 2 days a week and 3 mg 5 days a week. Denies any missed or extra doses. Denies changes in diet. Denies any new medications, vitamins, or supplements. Denies unusual bleeding, but has noted bruising from her dialysis access has been more prolonged after her treatments for the last week or more. Past Medical History:   Diagnosis Date     Sleep Apnea 2011    Compliant with c-pap.     Aortic aneurysm (HCC)     Abdominal    CAD (coronary Artery Disease) Native Artery 2/16/2011    Chronic kidney disease     Hemodiaylsis  3x/week    CKD (chronic kidney disease) stage 4, GFR 15-29 ml/min [facility] 2/10/2017    Coagulation disorder ([address] 06/2018    Anemia  Last blood Transfusion    COPD (chronic obstructive pulmonary disease) [facility]     Diabetic gastroparesis ([facility])     Diastolic heart failure ([address] 10/5/2012    DM type 2 causing neurological disease ([address]     DM type 2 causing renal disease ([facility])     Insulin SS at night only PRN    DM type 2 causing vascular disease ([address]     Esophageal stricture 2012    dialted Dr. Denver Bridges G6PD deficiency     trait    GERD (gastroesophageal reflux disease)     Gout     Hemodialysis access, AV graft ([address] 04/02/2017    Dialysis MWF    [address]      Hypertension     ILD (interstitial lung disease) ([address]     open lung bx CJW 2010    Infected dental caries 3/17/2020    Loss of appetite 3/17/2020    Morbid obesity ([facility]     Nausea and vomiting 3/17/2020    OA (osteoarthritis)     Ovarian cancer ([address]     cervical and uterine    Rheumatoid arteritis ([facility]     S/P left pulmonary artery pressure sensor implant placement 8/31/2017    Cardiomems     Thromboembolus ([address]     Right calf     Tobacco use disorder 3/21/2012    Uterine cervix cancer ([address]     Vitamin D deficiency 8/9/2013     Past Surgical History:   Procedure Laterality Date    COLONOSCOPY N/A 6/24/2016    COLONOSCOPY performed by Jacy England MD at [address] [place] [address] HX ADENOIDECTOMY      HX APPENDECTOMY      HX CARPAL TUNNEL RELEASE      bilateral    HX CHOLECYSTECTOMY      HX HEART CATHETERIZATION      x 7    HX HERNIA REPAIR      HX HYSTERECTOMY      HX ORTHOPAEDIC Right 11/12/12    right knee replacement    HX ORTHOPAEDIC Bilateral     carpal tunnel repair    HX SALPINGO-OOPHORECTOMY      HX TONSIL AND ADENOIDECTOMY      HX TONSILLECTOMY      HX VASCULAR ACCESS Left     Left lower arm AV fistula    AL CARDIAC SURG PROCEDURE UNLIST  02/2019    x4 with last sttent placed 2019.  OH CHEST SURGERY PROCEDURE UNLISTED Right     > 20 years ago  RLL removed     UPPER GI ENDOSCOPY,VINOD COLON GUIDE  2016          Family History   Problem Relation Age of Onset    Heart Disease Mother     Heart Disease Brother      Social History     Tobacco Use    Smoking status: Former Smoker     Packs/day: 0.50     Years: 41.00     Pack years: 20.50     Types: Cigarettes     Quit date: 2014     Years since quittin.2    Smokeless tobacco: Never Used   Substance Use Topics    Alcohol use: No       Review of Systems   Constitutional: Negative for activity change, appetite change, chills, diaphoresis, fatigue and fever. HENT: Negative. Eyes: Negative. Respiratory: Negative. Cardiovascular: Negative. Endocrine: Negative. Genitourinary: Negative. Musculoskeletal: Negative. Skin: Negative. Allergic/Immunologic: Negative. Neurological: Negative. Hematological: Bruises/bleeds easily. Psychiatric/Behavioral: Negative.       Visit Vitals  /66 (BP 1 Location: Right arm, BP Patient Position: Sitting, BP Cuff Size: Small adult)   Pulse 72   Temp 98.8 °F (37.1 °C) (Oral)   Resp 16   Ht 5' 10\" (1.778 m)   SpO2 98%   BMI 41.76 kg/m²         Physical Examination: General appearance - alert, well appearing, and in no distress and oriented to person, place, and time  Mental status - normal mood, behavior, speech, dress, motor activity, and thought processes  Eyes - sclera anicteric  Neck - supple, no significant adenopathy, thyroid exam: thyroid is normal in size without nodules or tenderness  Chest - clear to auscultation, no wheezes, rales or rhonchi, symmetric air entry  Heart - normal rate, regular rhythm, normal S1, S2, no murmurs, rubs, clicks or gallops, normal bilateral carotid upstroke without bruits, no JVD  Abdomen - soft, nontender, nondistended, no masses or organomegaly  bowel sounds normal  Neurological - alert, oriented, normal speech, no focal findings   Extremities - no pedal edema noted, intact peripheral pulses, no edema, redness or tenderness in the calves or thighs  Skin - normal coloration and turgor, no rashes        An electronic signature was used to authenticate this note.   -- Mehreen Gasca, NP

## 2022-02-10 ENCOUNTER — OFFICE VISIT (OUTPATIENT)
Dept: FAMILY MEDICINE CLINIC | Age: 67
End: 2022-02-10
Payer: MEDICARE

## 2022-02-10 VITALS
RESPIRATION RATE: 16 BRPM | OXYGEN SATURATION: 92 % | HEART RATE: 73 BPM | SYSTOLIC BLOOD PRESSURE: 119 MMHG | DIASTOLIC BLOOD PRESSURE: 69 MMHG | HEIGHT: 70 IN | TEMPERATURE: 98.4 F | BODY MASS INDEX: 41.03 KG/M2 | WEIGHT: 286.6 LBS

## 2022-02-10 DIAGNOSIS — Z79.01 ENCOUNTER FOR MONITORING COUMADIN THERAPY: Primary | ICD-10-CM

## 2022-02-10 DIAGNOSIS — Z51.81 ENCOUNTER FOR MONITORING COUMADIN THERAPY: Primary | ICD-10-CM

## 2022-02-10 LAB
INR BLD: 2.8
PT POC: 33.1 SECONDS
VALID INTERNAL CONTROL?: YES

## 2022-02-10 PROCEDURE — G0463 HOSPITAL OUTPT CLINIC VISIT: HCPCS | Performed by: FAMILY MEDICINE

## 2022-02-10 PROCEDURE — G9231 DOC ESRD DIA TRANS PREG: HCPCS | Performed by: FAMILY MEDICINE

## 2022-02-10 PROCEDURE — G8417 CALC BMI ABV UP PARAM F/U: HCPCS | Performed by: FAMILY MEDICINE

## 2022-02-10 PROCEDURE — 85610 PROTHROMBIN TIME: CPT | Performed by: FAMILY MEDICINE

## 2022-02-10 PROCEDURE — G8432 DEP SCR NOT DOC, RNG: HCPCS | Performed by: FAMILY MEDICINE

## 2022-02-10 PROCEDURE — 3017F COLORECTAL CA SCREEN DOC REV: CPT | Performed by: FAMILY MEDICINE

## 2022-02-10 PROCEDURE — 99213 OFFICE O/P EST LOW 20 MIN: CPT | Performed by: FAMILY MEDICINE

## 2022-02-10 PROCEDURE — G8427 DOCREV CUR MEDS BY ELIG CLIN: HCPCS | Performed by: FAMILY MEDICINE

## 2022-02-10 NOTE — PROGRESS NOTES
Chief Complaint   Patient presents with    Follow-up     INR     Patient presents in office today for INR check. INR was 6.5 on Tuesday. No other concerns. 1. Have you been to the ER, urgent care clinic since your last visit? Hospitalized since your last visit? No    2. Have you seen or consulted any other health care providers outside of the [address] [place] [address] since your last visit? Include any pap smears or colon screening.  No    Learning Assessment 6/28/2019   PRIMARY LEARNER Patient   PRIMARY LANGUAGE ENGLISH   LEARNER PREFERENCE PRIMARY LISTENING   ANSWERED BY patient    RELATIONSHIP SELF

## 2022-02-10 NOTE — PROGRESS NOTES
Progress Note    she is a 61y.o. year old female who presents for evalution. Subjective:     Pt here for INR check and was 6.5 Tuesday today she is 2.8. No bleeding issues, no changes with diet. Was consistent with medication. Reviewed PmHx, RxHx, FmHx, SocHx, AllgHx and updated and dated in the chart. Review of Systems - negative except as listed above in the HPI    Objective:     Vitals:    02/10/22 1323   BP: 119/69   Pulse: 73   Resp: 16   Temp: 98.4 °F (36.9 °C)   TempSrc: Oral   SpO2: 92%   Weight: 286 lb 9.6 oz (130 kg)   Height: 5' 10\" (1.778 m)       Current Outpatient Medications   Medication Sig    pantoprazole (Protonix) 40 mg tablet Take 1 Tablet by mouth daily.  CALCITRIOL PO Take  by mouth.  oxyCODONE-acetaminophen (PERCOCET 10)  mg per tablet Take 1 Tablet by mouth every eight (8) hours as needed for Pain for up to 30 days. Max Daily Amount: 3 Tablets.  atorvastatin (LIPITOR) 80 mg tablet TAKE 1 TABLET BY MOUTH ONCE DAILY NIGHTLY    sucralfate (CARAFATE) 1 gram tablet Take 1 tablet by mouth 4 times daily    carvediloL (COREG) 25 mg tablet TAKE 1 TABLET BY MOUTH TWICE DAILY WITH MEALS    warfarin (COUMADIN) 2.5 mg tablet 1 tab PO 2 days per week.  warfarin (COUMADIN) 3 mg tablet 1 tab PO qday x 5 days a week    triamcinolone acetonide (KENALOG) 0.1 % topical cream Apply  to affected area two (2) times a day. use thin layer    cyclobenzaprine (FLEXERIL) 5 mg tablet Take 1 Tablet by mouth nightly as needed for Muscle Spasm(s).  ergocalciferol (ERGOCALCIFEROL) 1,250 mcg (50,000 unit) capsule TAKE 1 CAPSULE BY MOUTH EVERY TUESDAY    mupirocin (BACTROBAN) 2 % ointment Apply  to affected area two (2) times a day.  allopurinoL (ZYLOPRIM) 100 mg tablet Take 1 tablet by mouth once daily    isosorbide mononitrate ER (IMDUR) 120 mg CR tablet 1 tab PO qday (Patient taking differently: 1/2 tablet)    docusate sodium (STOOL SOFTENER PO) Take  by mouth daily.  nitroglycerin (NITROSTAT) 0.4 mg SL tablet 1 Tab by SubLINGual route every five (5) minutes as needed for Chest Pain. Up to 3 doses.  naloxone (NARCAN) 4 mg/actuation nasal spray Use 1 spray intranasally, then discard. Repeat with new spray every 2 min as needed for opioid overdose symptoms, alternating nostrils.  albuterol (PROAIR HFA) 90 mcg/actuation inhaler Take 2 Puffs by inhalation every four (4) hours as needed for Wheezing.  Oxygen O2 2L NC 24/7    esomeprazole (NexIUM) 40 mg capsule Take 1 Capsule by mouth two (2) times a day. (Patient not taking: Reported on 12/9/2021)    amLODIPine (NORVASC) 10 mg tablet TAKE 1 TABLET BY MOUTH ONCE DAILY FOR HIGH BLOOD PRESSURE (Patient not taking: Reported on 11/5/2021)    predniSONE (STERAPRED DS) 10 mg dose pack See administration instruction per 10mg dose pack (Patient not taking: Reported on 7/8/2021)    levocetirizine (XYZAL) 5 mg tablet Take 1 Tab by mouth daily. (Patient not taking: Reported on 11/5/2021)     No current facility-administered medications for this visit. Physical Examination: General appearance - alert, well appearing, and in no distress      Assessment/ Plan:   Diagnoses and all orders for this visit:    1. Encounter for monitoring Coumadin therapy  -     AMB POC PT/INR     at goal today resume usual dosing f/u 2 weeks  Follow-up and Dispositions    · Return in about 2 weeks (around 2/24/2022), or if symptoms worsen or fail to improve. I have discussed the diagnosis with the patient and the intended plan as seen in the above orders. The patient has received an after-visit summary and questions were answered concerning future plans. Pt conveyed understanding of plan.     Medication Side Effects and Warnings were discussed with patient    An electronic signature was used to authenticate this note  Joanna Robertson DO

## 2022-02-25 ENCOUNTER — OFFICE VISIT (OUTPATIENT)
Dept: FAMILY MEDICINE CLINIC | Age: 67
End: 2022-02-25
Payer: MEDICARE

## 2022-02-25 VITALS
HEIGHT: 70 IN | BODY MASS INDEX: 41.03 KG/M2 | SYSTOLIC BLOOD PRESSURE: 131 MMHG | HEART RATE: 71 BPM | RESPIRATION RATE: 16 BRPM | TEMPERATURE: 98.6 F | OXYGEN SATURATION: 94 % | DIASTOLIC BLOOD PRESSURE: 73 MMHG | WEIGHT: 286.6 LBS

## 2022-02-25 DIAGNOSIS — Z51.81 ENCOUNTER FOR MONITORING COUMADIN THERAPY: Primary | ICD-10-CM

## 2022-02-25 DIAGNOSIS — Z79.01 ENCOUNTER FOR MONITORING COUMADIN THERAPY: Primary | ICD-10-CM

## 2022-02-25 LAB
INR BLD: 2.4
PT POC: 29.3 SECONDS
VALID INTERNAL CONTROL?: YES

## 2022-02-25 PROCEDURE — G9231 DOC ESRD DIA TRANS PREG: HCPCS | Performed by: FAMILY MEDICINE

## 2022-02-25 PROCEDURE — 3017F COLORECTAL CA SCREEN DOC REV: CPT | Performed by: FAMILY MEDICINE

## 2022-02-25 PROCEDURE — G8432 DEP SCR NOT DOC, RNG: HCPCS | Performed by: FAMILY MEDICINE

## 2022-02-25 PROCEDURE — G8417 CALC BMI ABV UP PARAM F/U: HCPCS | Performed by: FAMILY MEDICINE

## 2022-02-25 PROCEDURE — 85610 PROTHROMBIN TIME: CPT | Performed by: FAMILY MEDICINE

## 2022-02-25 PROCEDURE — G8427 DOCREV CUR MEDS BY ELIG CLIN: HCPCS | Performed by: FAMILY MEDICINE

## 2022-02-25 PROCEDURE — G0463 HOSPITAL OUTPT CLINIC VISIT: HCPCS | Performed by: FAMILY MEDICINE

## 2022-02-25 PROCEDURE — 99213 OFFICE O/P EST LOW 20 MIN: CPT | Performed by: FAMILY MEDICINE

## 2022-02-25 NOTE — PROGRESS NOTES
Chief Complaint   Patient presents with    Follow-up     INR     Patient presents in office today for  INR check. No concerns. 1. Have you been to the ER, urgent care clinic since your last visit? Hospitalized since your last visit? No    2. Have you seen or consulted any other health care providers outside of the [address] [place] [address] since your last visit? Include any pap smears or colon screening.  No    Learning Assessment 6/28/2019   PRIMARY LEARNER Patient   PRIMARY LANGUAGE ENGLISH   LEARNER PREFERENCE PRIMARY LISTENING   ANSWERED BY patient    RELATIONSHIP SELF

## 2022-02-25 NOTE — PATIENT INSTRUCTIONS
A Healthy Lifestyle: Care Instructions  Your Care Instructions     A healthy lifestyle can help you feel good, stay at a healthy weight, and have plenty of energy for both work and play. A healthy lifestyle is something you can share with your whole family. A healthy lifestyle also can lower your risk for serious health problems, such as high blood pressure, heart disease, and diabetes. You can follow a few steps listed below to improve your health and the health of your family. Follow-up care is a key part of your treatment and safety. Be sure to make and go to all appointments, and call your doctor if you are having problems. It's also a good idea to know your test results and keep a list of the medicines you take. How can you care for yourself at home? · Do not eat too much sugar, fat, or fast foods. You can still have dessert and treats now and then. The goal is moderation. · Start small to improve your eating habits. Pay attention to portion sizes, drink less juice and soda pop, and eat more fruits and vegetables. ? Eat a healthy amount of food. A 3-ounce serving of meat, for example, is about the size of a deck of cards. Fill the rest of your plate with vegetables and whole grains. ? Limit the amount of soda and sports drinks you have every day. Drink more water when you are thirsty. ? Eat plenty of fruits and vegetables every day. Have an apple or some carrot sticks as an afternoon snack instead of a candy bar. Try to have fruits and/or vegetables at every meal.  · Make exercise part of your daily routine. You may want to start with simple activities, such as walking, bicycling, or slow swimming. Try to be active 30 to 60 minutes every day. You do not need to do all 30 to 60 minutes all at once. For example, you can exercise 3 times a day for 10 or 20 minutes.  Moderate exercise is safe for most people, but it is always a good idea to talk to your doctor before starting an exercise program.  · Keep moving. Aldair Garrett the lawn, work in the garden, or [employer]. Take the stairs instead of the elevator at work. · If you smoke, quit. People who smoke have an increased risk for heart attack, stroke, cancer, and other lung illnesses. Quitting is hard, but there are ways to boost your chance of quitting tobacco for good. ? Use nicotine gum, patches, or lozenges. ? Ask your doctor about stop-smoking programs and medicines. ? Keep trying. In addition to reducing your risk of diseases in the future, you will notice some benefits soon after you stop using tobacco. If you have shortness of breath or asthma symptoms, they will likely get better within a few weeks after you quit. · Limit how much alcohol you drink. Moderate amounts of alcohol (up to 2 drinks a day for men, 1 drink a day for women) are okay. But drinking too much can lead to liver problems, high blood pressure, and other health problems. Family health  If you have a family, there are many things you can do together to improve your health. · Eat meals together as a family as often as possible. · Eat healthy foods. This includes fruits, vegetables, lean meats and dairy, and whole grains. · Include your family in your fitness plan. Most people think of activities such as jogging or tennis as the way to fitness, but there are many ways you and your family can be more active. Anything that makes you breathe hard and gets your heart pumping is exercise. Here are some tips:  ? Walk to do errands or to take your child to school or the bus.  ? Go for a family bike ride after dinner instead of watching TV. Where can you learn more? Go to http://www.gray.com/  Enter B252 in the search box to learn more about \"A Healthy Lifestyle: Care Instructions. \"  Current as of: June 16, 2021               Content Version: 13.0  © 3469-8347 Healthwise[employer].    Care instructions adapted under license by [employer] (which disclaims liability or warranty for this information). If you have questions about a medical condition or this instruction, always ask your healthcare professional. [address] any warranty or liability for your use of this information.

## 2022-02-25 NOTE — PROGRESS NOTES
Progress Note    she is a 61y.o. year old female who presents for evalution. Subjective:     Pt ehre for inr check. She was elebvated held for a couple days then was at goal so we continued with her normal dosing. She remains therapeutic at 2.54 today    Reviewed PmHx, RxHx, FmHx, SocHx, AllgHx and updated and dated in the chart. Review of Systems - negative except as listed above in the HPI    Objective:     Vitals:    02/25/22 1448   BP: 131/73   Pulse: 71   Resp: 16   Temp: 98.6 °F (37 °C)   TempSrc: Oral   SpO2: 94%   Weight: 286 lb 9.6 oz (130 kg)   Height: 5' 10\" (1.778 m)       Current Outpatient Medications   Medication Sig    pantoprazole (Protonix) 40 mg tablet Take 1 Tablet by mouth daily.  CALCITRIOL PO Take  by mouth.  oxyCODONE-acetaminophen (PERCOCET 10)  mg per tablet Take 1 Tablet by mouth every eight (8) hours as needed for Pain for up to 30 days. Max Daily Amount: 3 Tablets.  atorvastatin (LIPITOR) 80 mg tablet TAKE 1 TABLET BY MOUTH ONCE DAILY NIGHTLY    sucralfate (CARAFATE) 1 gram tablet Take 1 tablet by mouth 4 times daily    carvediloL (COREG) 25 mg tablet TAKE 1 TABLET BY MOUTH TWICE DAILY WITH MEALS    warfarin (COUMADIN) 2.5 mg tablet 1 tab PO 2 days per week.  warfarin (COUMADIN) 3 mg tablet 1 tab PO qday x 5 days a week    triamcinolone acetonide (KENALOG) 0.1 % topical cream Apply  to affected area two (2) times a day. use thin layer    cyclobenzaprine (FLEXERIL) 5 mg tablet Take 1 Tablet by mouth nightly as needed for Muscle Spasm(s).  ergocalciferol (ERGOCALCIFEROL) 1,250 mcg (50,000 unit) capsule TAKE 1 CAPSULE BY MOUTH EVERY TUESDAY    mupirocin (BACTROBAN) 2 % ointment Apply  to affected area two (2) times a day.     allopurinoL (ZYLOPRIM) 100 mg tablet Take 1 tablet by mouth once daily    isosorbide mononitrate ER (IMDUR) 120 mg CR tablet 1 tab PO qday (Patient taking differently: 1/2 tablet)    docusate sodium (STOOL SOFTENER PO) Take  by mouth daily.  nitroglycerin (NITROSTAT) 0.4 mg SL tablet 1 Tab by SubLINGual route every five (5) minutes as needed for Chest Pain. Up to 3 doses.  naloxone (NARCAN) 4 mg/actuation nasal spray Use 1 spray intranasally, then discard. Repeat with new spray every 2 min as needed for opioid overdose symptoms, alternating nostrils.  albuterol (PROAIR HFA) 90 mcg/actuation inhaler Take 2 Puffs by inhalation every four (4) hours as needed for Wheezing.  Oxygen O2 2L NC 24/7    esomeprazole (NexIUM) 40 mg capsule Take 1 Capsule by mouth two (2) times a day. (Patient not taking: Reported on 12/9/2021)    amLODIPine (NORVASC) 10 mg tablet TAKE 1 TABLET BY MOUTH ONCE DAILY FOR HIGH BLOOD PRESSURE (Patient not taking: Reported on 11/5/2021)    predniSONE (STERAPRED DS) 10 mg dose pack See administration instruction per 10mg dose pack (Patient not taking: Reported on 7/8/2021)    levocetirizine (XYZAL) 5 mg tablet Take 1 Tab by mouth daily. (Patient not taking: Reported on 11/5/2021)     No current facility-administered medications for this visit. Physical Examination: General appearance - alert, well appearing, and in no distress      Assessment/ Plan:   Diagnoses and all orders for this visit:    1. Encounter for monitoring Coumadin therapy  -     AMB POC PT/INR  @ goal recheck 4 weeks     Follow-up and Dispositions    · Return in about 4 weeks (around 3/25/2022), or if symptoms worsen or fail to improve. I have discussed the diagnosis with the patient and the intended plan as seen in the above orders. The patient has received an after-visit summary and questions were answered concerning future plans. Pt conveyed understanding of plan.     Medication Side Effects and Warnings were discussed with patient    An electronic signature was used to authenticate this note  Florence Ibarra DO

## 2022-03-18 PROBLEM — I49.3 PVC (PREMATURE VENTRICULAR CONTRACTION): Status: ACTIVE | Noted: 2019-03-20

## 2022-03-18 PROBLEM — Z86.718 HX OF DEEP VENOUS THROMBOSIS: Status: ACTIVE | Noted: 2018-05-30

## 2022-03-18 PROBLEM — R31.9 HEMATURIA: Status: ACTIVE | Noted: 2020-03-17

## 2022-03-18 PROBLEM — Z79.01 CHRONIC ANTICOAGULATION: Status: ACTIVE | Noted: 2019-12-13

## 2022-03-18 PROBLEM — N20.0 NEPHROLITHIASIS: Status: ACTIVE | Noted: 2019-03-20

## 2022-03-18 PROBLEM — J84.9 ILD (INTERSTITIAL LUNG DISEASE) (HCC): Status: ACTIVE | Noted: 2017-08-20

## 2022-03-18 PROBLEM — Z87.81 S/P ORIF (OPEN REDUCTION INTERNAL FIXATION) FRACTURE: Status: ACTIVE | Noted: 2019-05-24

## 2022-03-18 PROBLEM — R09.02 HYPOXIA: Status: ACTIVE | Noted: 2020-03-17

## 2022-03-18 PROBLEM — Z98.890 S/P ORIF (OPEN REDUCTION INTERNAL FIXATION) FRACTURE: Status: ACTIVE | Noted: 2019-05-24

## 2022-03-19 PROBLEM — N18.6 END STAGE RENAL FAILURE ON DIALYSIS (HCC): Status: ACTIVE | Noted: 2020-03-17

## 2022-03-19 PROBLEM — E87.70 HYPERVOLEMIA: Status: ACTIVE | Noted: 2020-03-17

## 2022-03-19 PROBLEM — R53.83 FATIGUE: Status: ACTIVE | Noted: 2020-03-17

## 2022-03-19 PROBLEM — R60.0 EDEMA OF BOTH LOWER EXTREMITIES: Status: ACTIVE | Noted: 2020-03-17

## 2022-03-19 PROBLEM — Z99.2 ESRD ON HEMODIALYSIS (HCC): Status: ACTIVE | Noted: 2018-06-23

## 2022-03-19 PROBLEM — N18.6 ESRD ON HEMODIALYSIS (HCC): Status: ACTIVE | Noted: 2018-06-23

## 2022-03-19 PROBLEM — Z95.9 S/P LEFT PULMONARY ARTERY PRESSURE SENSOR IMPLANT PLACEMENT: Status: ACTIVE | Noted: 2017-08-31

## 2022-03-19 PROBLEM — N18.6 ESRD (END STAGE RENAL DISEASE) (HCC): Status: ACTIVE | Noted: 2019-05-28

## 2022-03-19 PROBLEM — Z95.5 CORONARY STENT PATENT: Status: ACTIVE | Noted: 2020-03-17

## 2022-03-19 PROBLEM — M25.50 ARTHRALGIA: Status: ACTIVE | Noted: 2020-03-17

## 2022-03-19 PROBLEM — T84.53XA INFECTION OF TOTAL RIGHT KNEE REPLACEMENT (HCC): Status: ACTIVE | Noted: 2020-03-17

## 2022-03-19 PROBLEM — Z74.09 LIMITED MOBILITY: Status: ACTIVE | Noted: 2018-06-21

## 2022-03-19 PROBLEM — Z99.2 END STAGE RENAL FAILURE ON DIALYSIS (HCC): Status: ACTIVE | Noted: 2020-03-17

## 2022-03-20 PROBLEM — Z79.01 WARFARIN ANTICOAGULATION: Status: ACTIVE | Noted: 2017-08-20

## 2022-03-20 PROBLEM — I50.30 (HFPEF) HEART FAILURE WITH PRESERVED EJECTION FRACTION (HCC): Status: ACTIVE | Noted: 2018-09-23

## 2022-03-20 PROBLEM — R10.9 FLANK PAIN: Status: ACTIVE | Noted: 2020-03-17

## 2022-03-25 ENCOUNTER — OFFICE VISIT (OUTPATIENT)
Dept: FAMILY MEDICINE CLINIC | Age: 67
End: 2022-03-25
Payer: MEDICARE

## 2022-03-25 VITALS
SYSTOLIC BLOOD PRESSURE: 121 MMHG | OXYGEN SATURATION: 92 % | WEIGHT: 279.98 LBS | RESPIRATION RATE: 16 BRPM | HEIGHT: 70 IN | DIASTOLIC BLOOD PRESSURE: 58 MMHG | BODY MASS INDEX: 40.08 KG/M2 | HEART RATE: 71 BPM | TEMPERATURE: 98.4 F

## 2022-03-25 DIAGNOSIS — E55.9 HYPOVITAMINOSIS D: ICD-10-CM

## 2022-03-25 DIAGNOSIS — Z51.81 ENCOUNTER FOR MONITORING COUMADIN THERAPY: Primary | ICD-10-CM

## 2022-03-25 DIAGNOSIS — Z79.01 ENCOUNTER FOR MONITORING COUMADIN THERAPY: Primary | ICD-10-CM

## 2022-03-25 DIAGNOSIS — I26.99 OTHER PULMONARY EMBOLISM WITHOUT ACUTE COR PULMONALE, UNSPECIFIED CHRONICITY (HCC): ICD-10-CM

## 2022-03-25 DIAGNOSIS — E11.59 DM TYPE 2 CAUSING VASCULAR DISEASE (HCC): ICD-10-CM

## 2022-03-25 LAB
INR BLD: 3.9
PT POC: 46.3 SECONDS
VALID INTERNAL CONTROL?: YES

## 2022-03-25 PROCEDURE — 3046F HEMOGLOBIN A1C LEVEL >9.0%: CPT | Performed by: FAMILY MEDICINE

## 2022-03-25 PROCEDURE — G8510 SCR DEP NEG, NO PLAN REQD: HCPCS | Performed by: FAMILY MEDICINE

## 2022-03-25 PROCEDURE — 85610 PROTHROMBIN TIME: CPT | Performed by: FAMILY MEDICINE

## 2022-03-25 PROCEDURE — 3017F COLORECTAL CA SCREEN DOC REV: CPT | Performed by: FAMILY MEDICINE

## 2022-03-25 PROCEDURE — G9231 DOC ESRD DIA TRANS PREG: HCPCS | Performed by: FAMILY MEDICINE

## 2022-03-25 PROCEDURE — G8417 CALC BMI ABV UP PARAM F/U: HCPCS | Performed by: FAMILY MEDICINE

## 2022-03-25 PROCEDURE — 99214 OFFICE O/P EST MOD 30 MIN: CPT | Performed by: FAMILY MEDICINE

## 2022-03-25 PROCEDURE — 2022F DILAT RTA XM EVC RTNOPTHY: CPT | Performed by: FAMILY MEDICINE

## 2022-03-25 PROCEDURE — G8427 DOCREV CUR MEDS BY ELIG CLIN: HCPCS | Performed by: FAMILY MEDICINE

## 2022-03-25 PROCEDURE — G0463 HOSPITAL OUTPT CLINIC VISIT: HCPCS | Performed by: FAMILY MEDICINE

## 2022-03-25 RX ORDER — WARFARIN 2.5 MG/1
TABLET ORAL
Qty: 50 TABLET | Refills: 5
Start: 2022-03-25 | End: 2022-05-26

## 2022-03-25 RX ORDER — WARFARIN 3 MG/1
TABLET ORAL
Qty: 80 TABLET | Refills: 4
Start: 2022-03-25 | End: 2022-06-01 | Stop reason: SDUPTHER

## 2022-03-25 NOTE — PROGRESS NOTES
Progress Note    she is a 61y.o. year old female who presents for evalution. Subjective:     Pt here for INR check and elevated at 3.9, taking 2.5 2 days a week and 3mg 5 days a week. Will change to 2.5 4 days a week and 3 3 days a week. Pt also is planning to have R knee revision and needs A1C checked hx of type 2 DM very well controlled last A1C was 5. Also needs Vit D checked with hx of Vit D, was taken off Rx Vit D by nephro. Not taking a supplement. Reviewed PmHx, RxHx, FmHx, SocHx, AllgHx and updated and dated in the chart. Review of Systems - negative except as listed above in the HPI    Objective:     Vitals:    03/25/22 1437   BP: (!) 121/58   Pulse: 71   Resp: 16   Temp: 98.4 °F (36.9 °C)   TempSrc: Oral   SpO2: 92%   Weight: 279 lb 15.8 oz (127 kg)   Height: 5' 10\" (1.778 m)       Current Outpatient Medications   Medication Sig    warfarin (COUMADIN) 2.5 mg tablet 1 tab PO 4 days per week.  warfarin (COUMADIN) 3 mg tablet 1 tab PO qday x 3 days a week    pantoprazole (Protonix) 40 mg tablet Take 1 Tablet by mouth daily.  CALCITRIOL PO Take  by mouth.  atorvastatin (LIPITOR) 80 mg tablet TAKE 1 TABLET BY MOUTH ONCE DAILY NIGHTLY    sucralfate (CARAFATE) 1 gram tablet Take 1 tablet by mouth 4 times daily    carvediloL (COREG) 25 mg tablet TAKE 1 TABLET BY MOUTH TWICE DAILY WITH MEALS    triamcinolone acetonide (KENALOG) 0.1 % topical cream Apply  to affected area two (2) times a day. use thin layer    cyclobenzaprine (FLEXERIL) 5 mg tablet Take 1 Tablet by mouth nightly as needed for Muscle Spasm(s).  ergocalciferol (ERGOCALCIFEROL) 1,250 mcg (50,000 unit) capsule TAKE 1 CAPSULE BY MOUTH EVERY TUESDAY    mupirocin (BACTROBAN) 2 % ointment Apply  to affected area two (2) times a day.     allopurinoL (ZYLOPRIM) 100 mg tablet Take 1 tablet by mouth once daily    predniSONE (STERAPRED DS) 10 mg dose pack See administration instruction per 10mg dose pack    isosorbide mononitrate ER (IMDUR) 120 mg CR tablet 1 tab PO qday (Patient taking differently: 1/2 tablet)    docusate sodium (STOOL SOFTENER PO) Take  by mouth daily.  nitroglycerin (NITROSTAT) 0.4 mg SL tablet 1 Tab by SubLINGual route every five (5) minutes as needed for Chest Pain. Up to 3 doses.  naloxone (NARCAN) 4 mg/actuation nasal spray Use 1 spray intranasally, then discard. Repeat with new spray every 2 min as needed for opioid overdose symptoms, alternating nostrils.  albuterol (PROAIR HFA) 90 mcg/actuation inhaler Take 2 Puffs by inhalation every four (4) hours as needed for Wheezing.  Oxygen O2 2L NC 24/7    esomeprazole (NexIUM) 40 mg capsule Take 1 Capsule by mouth two (2) times a day. (Patient not taking: Reported on 12/9/2021)    amLODIPine (NORVASC) 10 mg tablet TAKE 1 TABLET BY MOUTH ONCE DAILY FOR HIGH BLOOD PRESSURE (Patient not taking: Reported on 11/5/2021)    levocetirizine (XYZAL) 5 mg tablet Take 1 Tab by mouth daily. (Patient not taking: Reported on 11/5/2021)     No current facility-administered medications for this visit. Physical Examination: General appearance - alert, well appearing, and in no distress  Chest - clear to auscultation, no wheezes, rales or rhonchi, symmetric air entry  Heart - normal rate, regular rhythm, normal S1, S2, no murmurs, rubs, clicks or gallops      Assessment/ Plan:   Diagnoses and all orders for this visit:    1. Encounter for monitoring Coumadin therapy  -     AMB POC PT/INR    2. Other pulmonary embolism without acute cor pulmonale, unspecified chronicity (HCC)  -     warfarin (COUMADIN) 2.5 mg tablet; 1 tab PO 4 days per week. -     warfarin (COUMADIN) 3 mg tablet; 1 tab PO qday x 3 days a week  Sun, tues Thurs, Sat take 2.5, all other days take 3. Hold for 2 days. Recheck 2-3 weeks  3. DM type 2 causing vascular disease ([facility]  -     HEMOGLOBIN A1C WITH EAG; Future    4.  Hypovitaminosis D  -     VITAMIN D, 25 HYDROXY; Future Follow-up and Dispositions    · Return in about 3 weeks (around 4/15/2022), or if symptoms worsen or fail to improve. I have discussed the diagnosis with the patient and the intended plan as seen in the above orders. The patient has received an after-visit summary and questions were answered concerning future plans. Pt conveyed understanding of plan.     Medication Side Effects and Warnings were discussed with patient    An electronic signature was used to authenticate this note  Jorge Menjivar, DO

## 2022-03-25 NOTE — PROGRESS NOTES
Chief Complaint   Patient presents with    Follow-up     INR check     Patient presents in office today for INR check. Would like to have her Vit D and A1C drawn in order to schedule knee surgery,  No other concerns. 1. Have you been to the ER, urgent care clinic since your last visit? Hospitalized since your last visit? No    2. Have you seen or consulted any other health care providers outside of the [address] [place] [address] since your last visit? Include any pap smears or colon screening.  No    Learning Assessment 6/28/2019   PRIMARY LEARNER Patient   PRIMARY LANGUAGE ENGLISH   LEARNER PREFERENCE PRIMARY LISTENING   ANSWERED BY patient    RELATIONSHIP SELF

## 2022-03-25 NOTE — PATIENT INSTRUCTIONS
A Healthy Lifestyle: Care Instructions  Your Care Instructions     A healthy lifestyle can help you feel good, stay at a healthy weight, and have plenty of energy for both work and play. A healthy lifestyle is something you can share with your whole family. A healthy lifestyle also can lower your risk for serious health problems, such as high blood pressure, heart disease, and diabetes. You can follow a few steps listed below to improve your health and the health of your family. Follow-up care is a key part of your treatment and safety. Be sure to make and go to all appointments, and call your doctor if you are having problems. It's also a good idea to know your test results and keep a list of the medicines you take. How can you care for yourself at home? · Do not eat too much sugar, fat, or fast foods. You can still have dessert and treats now and then. The goal is moderation. · Start small to improve your eating habits. Pay attention to portion sizes, drink less juice and soda pop, and eat more fruits and vegetables. ? Eat a healthy amount of food. A 3-ounce serving of meat, for example, is about the size of a deck of cards. Fill the rest of your plate with vegetables and whole grains. ? Limit the amount of soda and sports drinks you have every day. Drink more water when you are thirsty. ? Eat plenty of fruits and vegetables every day. Have an apple or some carrot sticks as an afternoon snack instead of a candy bar. Try to have fruits and/or vegetables at every meal.  · Make exercise part of your daily routine. You may want to start with simple activities, such as walking, bicycling, or slow swimming. Try to be active 30 to 60 minutes every day. You do not need to do all 30 to 60 minutes all at once. For example, you can exercise 3 times a day for 10 or 20 minutes.  Moderate exercise is safe for most people, but it is always a good idea to talk to your doctor before starting an exercise program.  · Keep moving. [place] [employer] the lawn, work in the garden, or [employer]. Take the stairs instead of the elevator at work. · If you smoke, quit. People who smoke have an increased risk for heart attack, stroke, cancer, and other lung illnesses. Quitting is hard, but there are ways to boost your chance of quitting tobacco for good. ? Use nicotine gum, patches, or lozenges. ? Ask your doctor about stop-smoking programs and medicines. ? Keep trying. In addition to reducing your risk of diseases in the future, you will notice some benefits soon after you stop using tobacco. If you have shortness of breath or asthma symptoms, they will likely get better within a few weeks after you quit. · Limit how much alcohol you drink. Moderate amounts of alcohol (up to 2 drinks a day for men, 1 drink a day for women) are okay. But drinking too much can lead to liver problems, high blood pressure, and other health problems. Family health  If you have a family, there are many things you can do together to improve your health. · Eat meals together as a family as often as possible. · Eat healthy foods. This includes fruits, vegetables, lean meats and dairy, and whole grains. · Include your family in your fitness plan. Most people think of activities such as jogging or tennis as the way to fitness, but there are many ways you and your family can be more active. Anything that makes you breathe hard and gets your heart pumping is exercise. Here are some tips:  ? Walk to do errands or to take your child to school or the bus.  ? Go for a family bike ride after dinner instead of watching TV. Where can you learn more? Go to http://www.gray.com/  Enter G938 in the search box to learn more about \"A Healthy Lifestyle: Care Instructions. \"  Current as of: June 16, 2021               Content Version: 13.2  © 4250-5151 [employer], [employer].    Care instructions adapted under license by [employer] (which disclaims liability or warranty for this information). If you have questions about a medical condition or this instruction, always ask your healthcare professional. [address] any warranty or liability for your use of this information.

## 2022-03-26 LAB
25(OH)D3 SERPL-MCNC: 48.5 NG/ML (ref 30–100)
EST. AVERAGE GLUCOSE BLD GHB EST-MCNC: 88 MG/DL
HBA1C MFR BLD: 4.7 % (ref 4–5.6)

## 2022-04-08 ENCOUNTER — TELEPHONE (OUTPATIENT)
Dept: FAMILY MEDICINE CLINIC | Age: 67
End: 2022-04-08

## 2022-04-08 DIAGNOSIS — R07.9 CHRONIC CHEST PAIN: ICD-10-CM

## 2022-04-08 DIAGNOSIS — G89.29 CHRONIC PAIN OF RIGHT KNEE: ICD-10-CM

## 2022-04-08 DIAGNOSIS — M25.561 CHRONIC PAIN OF RIGHT KNEE: ICD-10-CM

## 2022-04-08 DIAGNOSIS — G89.29 CHRONIC BILATERAL LOW BACK PAIN, UNSPECIFIED WHETHER SCIATICA PRESENT: ICD-10-CM

## 2022-04-08 DIAGNOSIS — G89.29 CHRONIC CHEST PAIN: ICD-10-CM

## 2022-04-08 DIAGNOSIS — M54.50 CHRONIC BILATERAL LOW BACK PAIN, UNSPECIFIED WHETHER SCIATICA PRESENT: ICD-10-CM

## 2022-04-08 RX ORDER — OXYCODONE AND ACETAMINOPHEN 10; 325 MG/1; MG/1
1 TABLET ORAL
Qty: 90 TABLET | Refills: 0 | Status: SHIPPED | OUTPATIENT
Start: 2022-04-08 | End: 2022-04-14

## 2022-04-08 NOTE — TELEPHONE ENCOUNTER
checked and appropriate med sent in.   We esthela raymond to do her visit next week for the remainder of refills for a 3 months period

## 2022-04-12 ENCOUNTER — HOSPITAL ENCOUNTER (OUTPATIENT)
Dept: PREADMISSION TESTING | Age: 67
Discharge: HOME | End: 2022-04-12
Payer: MEDICARE

## 2022-04-12 VITALS
WEIGHT: 279.98 LBS | SYSTOLIC BLOOD PRESSURE: 133 MMHG | HEART RATE: 64 BPM | BODY MASS INDEX: 40.08 KG/M2 | DIASTOLIC BLOOD PRESSURE: 75 MMHG | RESPIRATION RATE: 18 BRPM | HEIGHT: 70 IN | TEMPERATURE: 97.9 F

## 2022-04-12 LAB
ABO + RH BLD: NORMAL
ALBUMIN SERPL-MCNC: 3.4 G/DL (ref 3.5–5)
ALBUMIN/GLOB SERPL: 0.9 {RATIO} (ref 1.1–2.2)
ALP SERPL-CCNC: 129 U/L (ref 45–117)
ALT SERPL-CCNC: 12 U/L (ref 12–78)
ANION GAP SERPL CALC-SCNC: 7 MMOL/L (ref 5–15)
APTT PPP: 54.2 SEC (ref 22.1–31)
AST SERPL-CCNC: 6 U/L (ref 15–37)
BASOPHILS # BLD: 0 K/UL (ref 0–0.1)
BASOPHILS NFR BLD: 0 % (ref 0–1)
BILIRUB SERPL-MCNC: 0.5 MG/DL (ref 0.2–1)
BLOOD GROUP ANTIBODIES SERPL: NORMAL
BUN SERPL-MCNC: 27 MG/DL (ref 6–20)
BUN/CREAT SERPL: 4 (ref 12–20)
CALCIUM SERPL-MCNC: 9.8 MG/DL (ref 8.5–10.1)
CHLORIDE SERPL-SCNC: 102 MMOL/L (ref 97–108)
CO2 SERPL-SCNC: 30 MMOL/L (ref 21–32)
CREAT SERPL-MCNC: 6.96 MG/DL (ref 0.55–1.02)
DIFFERENTIAL METHOD BLD: ABNORMAL
EOSINOPHIL # BLD: 0.4 K/UL (ref 0–0.4)
EOSINOPHIL NFR BLD: 6 % (ref 0–7)
ERYTHROCYTE [DISTWIDTH] IN BLOOD BY AUTOMATED COUNT: 15.7 % (ref 11.5–14.5)
GLOBULIN SER CALC-MCNC: 3.8 G/DL (ref 2–4)
GLUCOSE SERPL-MCNC: 86 MG/DL (ref 65–100)
HCT VFR BLD AUTO: 34.6 % (ref 35–47)
HGB BLD-MCNC: 10.6 G/DL (ref 11.5–16)
IMM GRANULOCYTES # BLD AUTO: 0 K/UL (ref 0–0.04)
IMM GRANULOCYTES NFR BLD AUTO: 0 % (ref 0–0.5)
INR PPP: 3.8 (ref 0.9–1.1)
LYMPHOCYTES # BLD: 2 K/UL (ref 0.8–3.5)
LYMPHOCYTES NFR BLD: 26 % (ref 12–49)
MCH RBC QN AUTO: 30.7 PG (ref 26–34)
MCHC RBC AUTO-ENTMCNC: 30.6 G/DL (ref 30–36.5)
MCV RBC AUTO: 100.3 FL (ref 80–99)
MONOCYTES # BLD: 0.6 K/UL (ref 0–1)
MONOCYTES NFR BLD: 8 % (ref 5–13)
NEUTS SEG # BLD: 4.5 K/UL (ref 1.8–8)
NEUTS SEG NFR BLD: 60 % (ref 32–75)
NRBC # BLD: 0 K/UL (ref 0–0.01)
NRBC BLD-RTO: 0 PER 100 WBC
PLATELET # BLD AUTO: 263 K/UL (ref 150–400)
PMV BLD AUTO: 10.5 FL (ref 8.9–12.9)
POTASSIUM SERPL-SCNC: 3.6 MMOL/L (ref 3.5–5.1)
PROT SERPL-MCNC: 7.2 G/DL (ref 6.4–8.2)
PROTHROMBIN TIME: 36.4 SEC (ref 9–11.1)
RBC # BLD AUTO: 3.45 M/UL (ref 3.8–5.2)
SODIUM SERPL-SCNC: 139 MMOL/L (ref 136–145)
SPECIMEN EXP DATE BLD: NORMAL
THERAPEUTIC RANGE,PTTT: ABNORMAL SECS (ref 58–77)
WBC # BLD AUTO: 7.6 K/UL (ref 3.6–11)

## 2022-04-12 PROCEDURE — 85025 COMPLETE CBC W/AUTO DIFF WBC: CPT

## 2022-04-12 PROCEDURE — 80053 COMPREHEN METABOLIC PANEL: CPT

## 2022-04-12 PROCEDURE — 85610 PROTHROMBIN TIME: CPT

## 2022-04-12 PROCEDURE — 85730 THROMBOPLASTIN TIME PARTIAL: CPT

## 2022-04-12 PROCEDURE — 86900 BLOOD TYPING SEROLOGIC ABO: CPT

## 2022-04-12 RX ORDER — LUBIPROSTONE 24 UG/1
24 CAPSULE, GELATIN COATED ORAL 2 TIMES DAILY WITH MEALS
COMMUNITY
End: 2022-06-30

## 2022-04-12 NOTE — PERIOP NOTES
[address] INSTRUCTIONS    Surgery Date:  04-    [facility] staff will call you between 4 PM- 8 PM the day before surgery with your arrival time. If your surgery is on a Monday, we will call you the preceding Friday. Please call [phone] after 8 PM if you did not receive your arrival time. 1. Please report to [facility] Patient Access/Admitting on the 1st floor. Bring your insurance card, photo identification, and any copayment ( if applicable). 2. If you are going home the same day of your surgery, you must have a responsible adult to drive you home. You need to have a responsible adult to stay with you the first 24 hours after surgery and you should not drive a car for 24 hours following your surgery. 3. Nothing to eat or drink after midnight the night before surgery. This includes no water, gum, mints, coffee, juice, etc.  Please note special instructions, if applicable, below for medications. 4. Do NOT drink alcohol or smoke 24 hours before surgery. STOP smoking for 14 days prior as it helps with breathing and healing after surgery. 5. If you are being admitted to the hospital, please leave personal belongings/luggage in your car until you have an assigned hospital room number. 6. Please wear comfortable clothes. Wear your glasses instead of contacts. We ask that all money, jewelry and valuables be left at home. Wear no make up, particularly mascara, the day of surgery. 7.  All body piercings, rings, and jewelry need to be removed and left at home. Please remove any nail polish or artificial nails from your fingernails. Please wear your hair loose or down. Please no pony-tails, buns, or any metal hair accessories. If you shower the morning of surgery, please do not apply any lotions or powders afterwards. You may wear deodorant, unless having breast surgery. Do not shave any body area within 24 hours of your surgery.   8. Please follow all instructions to avoid any potential surgical cancellation. 9. Should your physical condition change, (i.e. fever, cold, flu, etc.) please notify your surgeon as soon as possible. 10. It is important to be on time. If a situation occurs where you may be delayed, please call:  [phone] / [phone] on the day of surgery. 11. The Preadmission Testing staff can be reached at [phone]. 12. Special instructions: NONE      Current Outpatient Medications   Medication Sig    oxyCODONE-acetaminophen (PERCOCET 10)  mg per tablet Take 1 Tablet by mouth every eight (8) hours as needed for Pain for up to 30 days. Max Daily Amount: 3 Tablets.  warfarin (COUMADIN) 2.5 mg tablet 1 tab PO 4 days per week.  warfarin (COUMADIN) 3 mg tablet 1 tab PO qday x 3 days a week    pantoprazole (Protonix) 40 mg tablet Take 1 Tablet by mouth daily.  CALCITRIOL PO Take  by mouth.  atorvastatin (LIPITOR) 80 mg tablet TAKE 1 TABLET BY MOUTH ONCE DAILY NIGHTLY    sucralfate (CARAFATE) 1 gram tablet Take 1 tablet by mouth 4 times daily    carvediloL (COREG) 25 mg tablet TAKE 1 TABLET BY MOUTH TWICE DAILY WITH MEALS    esomeprazole (NexIUM) 40 mg capsule Take 1 Capsule by mouth two (2) times a day. (Patient not taking: Reported on 12/9/2021)    triamcinolone acetonide (KENALOG) 0.1 % topical cream Apply  to affected area two (2) times a day. use thin layer    cyclobenzaprine (FLEXERIL) 5 mg tablet Take 1 Tablet by mouth nightly as needed for Muscle Spasm(s).  ergocalciferol (ERGOCALCIFEROL) 1,250 mcg (50,000 unit) capsule TAKE 1 CAPSULE BY MOUTH EVERY TUESDAY    mupirocin (BACTROBAN) 2 % ointment Apply  to affected area two (2) times a day.     amLODIPine (NORVASC) 10 mg tablet TAKE 1 TABLET BY MOUTH ONCE DAILY FOR HIGH BLOOD PRESSURE (Patient not taking: Reported on 11/5/2021)    allopurinoL (ZYLOPRIM) 100 mg tablet Take 1 tablet by mouth once daily    predniSONE (STERAPRED DS) 10 mg dose pack See administration instruction per 10mg dose pack    isosorbide mononitrate ER (IMDUR) 120 mg CR tablet 1 tab PO qday (Patient taking differently: 1/2 tablet)    levocetirizine (XYZAL) 5 mg tablet Take 1 Tab by mouth daily. (Patient not taking: Reported on 11/5/2021)    docusate sodium (STOOL SOFTENER PO) Take  by mouth daily.  nitroglycerin (NITROSTAT) 0.4 mg SL tablet 1 Tab by SubLINGual route every five (5) minutes as needed for Chest Pain. Up to 3 doses.  naloxone (NARCAN) 4 mg/actuation nasal spray Use 1 spray intranasally, then discard. Repeat with new spray every 2 min as needed for opioid overdose symptoms, alternating nostrils.  albuterol (PROAIR HFA) 90 mcg/actuation inhaler Take 2 Puffs by inhalation every four (4) hours as needed for Wheezing.  Oxygen O2 2L NC 24/7     No current facility-administered medications for this encounter. 1. YOU MUST ONLY TAKE THESE MEDICATIONS THE MORNING OF SURGERY WITH A SIP OF WATER: PANTOPRAZOLE,CARVEDILOL,ALLOPURINOL,ISORBIDE  2. MEDICATIONS TO TAKE THE MORNING OF SURGERY ONLY IF NEEDED: OXYCODONE  3. HOLD these prescription medications BEFORE Surgery: NONE  4. Ask your surgeon/prescribing physician about when/if to STOP taking these medications: NONE  5. Stop all vitamins, herbal medicines and Aspirin containing products 7 days prior to surgery. Stop any non-steroidal anti-inflammatory drugs (i.e. Ibuprofen, Naproxen, Advil, Aleve) 3 days before surgery. You may take Tylenol. 6. If you are currently taking Plavix, Coumadin, or any other blood-thinning/anticoagulant medication contact your prescribing physician for instructions. Preventing Infections Before and After - Your Surgery    IMPORTANT INSTRUCTIONS      You play an important role in your health and preparation for surgery. To reduce the germs on your skin you will need to shower with CHG soap (Chorhexidine gluconate 4%) two times before surgery.     CHG soap (Hibiclens, Hex-A-Clens or store brand)   CHG soap will be provided at your Preadmission Testing (PAT) appointment.  If you do not have a PAT appointment before surgery, you may arrange to  CHG soap from our office or purchase CHG soap at a pharmacy, grocery or department store.  You need to purchase TWO 4 ounce bottles to use for your 2 showers. Steps to follow:  1. Wash your hair with your normal shampoo and your body with regular soap and rinse well to remove shampoo and soap from your skin. 2. Wet a clean washcloth and turn off the shower. 3. Put CHG soap on washcloth and apply to your entire body from the neck down. Do not use on your head, face or private parts(genitals). Do not use CHG soap on open sores, wounds or areas of skin irritation. 4. Wash you body gently for 5 minutes. Do not wash your skin too hard. This soap does not create lather. Pay special attention to your underarms and from your belly button to your feet. 5. Turn the shower back on and rinse well to get CHG soap off your body. 6. Pat your skin dry with a clean, dry towel. Do not apply lotions or moisturizer. 7. Put on clean clothes and sleep on fresh bed sheets and do not allow pets to sleep with you. Shower with CHG soap 2 times before your surgery   The evening before your surgery   The morning of your surgery      Tips to help prevent infections after your surgery:  1. Protect your surgical wound from germs:  ? Hand washing is the most important thing you and your caregivers can do to prevent infections. ? Keep your bandage clean and dry! ? Do not touch your surgical wound. 2. Use clean, freshly washed towels and washcloths every time you shower; do not share bath linens with others. 3. Until your surgical wound is healed, wear clothing and sleep on bed linens each day that are clean and freshly washed. 4. Do not allow pets to sleep in your bed with you or touch your surgical wound. 5. Do not smoke - smoking delays wound healing.  This may be a good time to stop smoking. 6. If you have diabetes, it is important for you to manage your blood sugar levels properly before your surgery as well as after your surgery. Poorly managed blood sugar levels slow down wound healing and prevent you from healing completely. Patient Information Regarding COVID Restrictions    Day of Procedure     Please park in the parking deck or any designated visitor parking lot.  Enter the facility through the [facility] of the hospitals.   On the day of surgery, please provide the cell phone number of the person who will be waiting for you to the Patient Access representative at the time of registration.  Please wear a mask on the day of your procedure.  We are now allowing two designated visitors per stay. Pediatric patients may have 2 designated visitors. These two people may come in with you on the day of your procedure.  No visitors under the age of 13.  The designated visitor must also wear a mask.  Once your procedure and the immediate recovery period is completed, a nurse in the recovery area will contact your designated visitor to inform them of your room number or to otherwise review other pertinent information regarding your care.  Social distancing practices are to be adhered to in waiting areas and the cafeteria. The patient was contacted  in person. She verbalized understanding of all instructions does not  need reinforcement.

## 2022-04-12 NOTE — PERIOP NOTES
PREOP INSTRUCTIONS AND MEDICATIONS PRINTED AND REVIEWED WITH PATIENT    COPY OF COVID CARD ON CHART    CONSENT SIGNED AND ON MEDICAL CHART    REQUESTED RECENT CARDIAC NOTES AND EKG FROM DR. MONREAL OFFICE [phone] RECEIVED NOTES AND PLACED ON CHART    DECLINED TO HAVE CXR COMPLETED TODAY , INFORMED DR. MARY PEREYRA OFFICE, SPOKE WITH ENE VALENCIA,  STATED CXR CAN BE COMPLETED DAY OF SURGERY PATIENT STATED I HAVE MORE APPTS THIS AFTERNOON AND TOMORROW I JUST DON'T HAVE TIME    RECEIVED PULMONARY NOTES AND PLACED ON MEDICAL CHART    SPOKE WITH [facility] SURGICAL COORDINATOR EKG CAN BE DONE MORNING OF SURGERY

## 2022-04-13 NOTE — PERIOP NOTES
CALLED DR. PEREYRA'S OFFICE AND NOTIFIED [facility] OF ABNORMAL LABS BUN 27, CREATININE 6.96, ALK PHOS 129, RBC 3.45, HGB 10.6, HCT 34.6, .3, INR 3.8, PT 36.4, PTT 54.2. PATIENT IS ON COUMADIN.    ALSO ASKED IF THEY HAVE A CARDIAC CLEARANCE ON PATIENT. WE HAVE AN OFFICE VISIT NOTE FROM HER CARDIOLOGIST DR. MONREAL FROM 1/27/22 THAT STATES THAT PATIENT IS NOT A CANDIDATE FOR SURGERY. I FAXED NOTE TO [facility] PER HER REQUEST TO SHOW TO DR. Oscar Bernal. LAB RESULTS ROUTED VIA CC TO PATIENTS PCP, DR. FATOU HUNG FOR REVIEW.

## 2022-04-14 ENCOUNTER — HOSPITAL ENCOUNTER (OUTPATIENT)
Age: 67
Setting detail: OUTPATIENT SURGERY
Discharge: HOME | End: 2022-04-14
Payer: MEDICARE

## 2022-04-14 ENCOUNTER — DOCUMENTATION ONLY (OUTPATIENT)
Dept: FAMILY MEDICINE CLINIC | Age: 67
End: 2022-04-14

## 2022-04-14 ENCOUNTER — ANESTHESIA (OUTPATIENT)
Dept: SURGERY | Age: 67
End: 2022-04-14
Payer: MEDICARE

## 2022-04-14 ENCOUNTER — ANESTHESIA EVENT (OUTPATIENT)
Dept: SURGERY | Age: 67
End: 2022-04-14
Payer: MEDICARE

## 2022-04-14 VITALS
OXYGEN SATURATION: 100 % | DIASTOLIC BLOOD PRESSURE: 84 MMHG | TEMPERATURE: 98.2 F | BODY MASS INDEX: 40.08 KG/M2 | HEART RATE: 58 BPM | RESPIRATION RATE: 16 BRPM | WEIGHT: 279.98 LBS | SYSTOLIC BLOOD PRESSURE: 135 MMHG | HEIGHT: 70 IN

## 2022-04-14 DIAGNOSIS — Z99.2 END STAGE RENAL FAILURE ON DIALYSIS (HCC): Primary | ICD-10-CM

## 2022-04-14 DIAGNOSIS — N18.6 END STAGE RENAL FAILURE ON DIALYSIS (HCC): Primary | ICD-10-CM

## 2022-04-14 LAB
ANION GAP BLD CALC-SCNC: 7 MMOL/L (ref 10–20)
CA-I BLD-MCNC: 1.22 MMOL/L (ref 1.12–1.32)
CHLORIDE BLD-SCNC: 104 MMOL/L (ref 98–107)
CO2 BLD-SCNC: 31.2 MMOL/L (ref 21–32)
CREAT BLD-MCNC: 6.77 MG/DL (ref 0.6–1.3)
GLUCOSE BLD STRIP.AUTO-MCNC: 87 MG/DL (ref 65–117)
GLUCOSE BLD-MCNC: 88 MG/DL (ref 65–100)
INR BLD: 3.1 (ref 0.9–1.2)
POTASSIUM BLD-SCNC: 3.1 MMOL/L (ref 3.5–5.1)
SERVICE CMNT-IMP: ABNORMAL
SERVICE CMNT-IMP: NORMAL
SODIUM BLD-SCNC: 141 MMOL/L (ref 136–145)

## 2022-04-14 PROCEDURE — 76010000149 HC OR TIME 1 TO 1.5 HR

## 2022-04-14 PROCEDURE — 74011000250 HC RX REV CODE- 250: Performed by: NURSE ANESTHETIST, CERTIFIED REGISTERED

## 2022-04-14 PROCEDURE — 77030020829

## 2022-04-14 PROCEDURE — 76210000006 HC OR PH I REC 0.5 TO 1 HR

## 2022-04-14 PROCEDURE — 2709999900 HC NON-CHARGEABLE SUPPLY

## 2022-04-14 PROCEDURE — 76060000033 HC ANESTHESIA 1 TO 1.5 HR

## 2022-04-14 PROCEDURE — 77030008684 HC TU ET CUF COVD -B: Performed by: ANESTHESIOLOGY

## 2022-04-14 PROCEDURE — 77030031139 HC SUT VCRL2 J&J -A

## 2022-04-14 PROCEDURE — 74011250636 HC RX REV CODE- 250/636: Performed by: NURSE ANESTHETIST, CERTIFIED REGISTERED

## 2022-04-14 PROCEDURE — 74011250636 HC RX REV CODE- 250/636

## 2022-04-14 PROCEDURE — C1750 CATH, HEMODIALYSIS,LONG-TERM: HCPCS

## 2022-04-14 PROCEDURE — 74011250637 HC RX REV CODE- 250/637: Performed by: ANESTHESIOLOGY

## 2022-04-14 PROCEDURE — 77030026438 HC STYL ET INTUB CARD -A: Performed by: ANESTHESIOLOGY

## 2022-04-14 PROCEDURE — 77030010507 HC ADH SKN DERMBND J&J -B

## 2022-04-14 PROCEDURE — 76210000020 HC REC RM PH II FIRST 0.5 HR

## 2022-04-14 PROCEDURE — 85610 PROTHROMBIN TIME: CPT

## 2022-04-14 PROCEDURE — 77030002986 HC SUT PROL J&J -A

## 2022-04-14 PROCEDURE — 74011000258 HC RX REV CODE- 258: Performed by: NURSE ANESTHETIST, CERTIFIED REGISTERED

## 2022-04-14 PROCEDURE — 74011000250 HC RX REV CODE- 250

## 2022-04-14 PROCEDURE — 82962 GLUCOSE BLOOD TEST: CPT

## 2022-04-14 PROCEDURE — 77030004495 HC ADPT PERI DLYS BAXT -C

## 2022-04-14 PROCEDURE — 74011250636 HC RX REV CODE- 250/636: Performed by: ANESTHESIOLOGY

## 2022-04-14 PROCEDURE — 77030002996 HC SUT SLK J&J -A

## 2022-04-14 PROCEDURE — 80047 BASIC METABLC PNL IONIZED CA: CPT

## 2022-04-14 RX ORDER — LIDOCAINE HYDROCHLORIDE 10 MG/ML
0.1 INJECTION, SOLUTION EPIDURAL; INFILTRATION; INTRACAUDAL; PERINEURAL AS NEEDED
Status: CANCELLED | OUTPATIENT
Start: 2022-04-14

## 2022-04-14 RX ORDER — FENTANYL CITRATE 50 UG/ML
50 INJECTION, SOLUTION INTRAMUSCULAR; INTRAVENOUS AS NEEDED
Status: CANCELLED | OUTPATIENT
Start: 2022-04-14

## 2022-04-14 RX ORDER — SODIUM CHLORIDE 0.9 % (FLUSH) 0.9 %
5-40 SYRINGE (ML) INJECTION AS NEEDED
Status: CANCELLED | OUTPATIENT
Start: 2022-04-14

## 2022-04-14 RX ORDER — LIDOCAINE HYDROCHLORIDE 20 MG/ML
INJECTION, SOLUTION EPIDURAL; INFILTRATION; INTRACAUDAL; PERINEURAL AS NEEDED
Status: DISCONTINUED | OUTPATIENT
Start: 2022-04-14 | End: 2022-04-14 | Stop reason: HOSPADM

## 2022-04-14 RX ORDER — SODIUM CHLORIDE 9 MG/ML
INJECTION, SOLUTION INTRAVENOUS
Status: DISCONTINUED | OUTPATIENT
Start: 2022-04-14 | End: 2022-04-14 | Stop reason: HOSPADM

## 2022-04-14 RX ORDER — SUCCINYLCHOLINE CHLORIDE 20 MG/ML
INJECTION INTRAMUSCULAR; INTRAVENOUS AS NEEDED
Status: DISCONTINUED | OUTPATIENT
Start: 2022-04-14 | End: 2022-04-14 | Stop reason: HOSPADM

## 2022-04-14 RX ORDER — SODIUM CHLORIDE, SODIUM LACTATE, POTASSIUM CHLORIDE, CALCIUM CHLORIDE 600; 310; 30; 20 MG/100ML; MG/100ML; MG/100ML; MG/100ML
25 INJECTION, SOLUTION INTRAVENOUS CONTINUOUS
Status: CANCELLED | OUTPATIENT
Start: 2022-04-14 | End: 2022-04-15

## 2022-04-14 RX ORDER — OXYCODONE HYDROCHLORIDE 5 MG/1
5 TABLET ORAL AS NEEDED
Status: DISCONTINUED | OUTPATIENT
Start: 2022-04-14 | End: 2022-04-14 | Stop reason: HOSPADM

## 2022-04-14 RX ORDER — ROCURONIUM BROMIDE 10 MG/ML
INJECTION, SOLUTION INTRAVENOUS AS NEEDED
Status: DISCONTINUED | OUTPATIENT
Start: 2022-04-14 | End: 2022-04-14 | Stop reason: HOSPADM

## 2022-04-14 RX ORDER — SODIUM CHLORIDE 9 MG/ML
25 INJECTION, SOLUTION INTRAVENOUS CONTINUOUS
Status: DISCONTINUED | OUTPATIENT
Start: 2022-04-14 | End: 2022-04-14 | Stop reason: HOSPADM

## 2022-04-14 RX ORDER — SODIUM CHLORIDE 0.9 % (FLUSH) 0.9 %
5-40 SYRINGE (ML) INJECTION EVERY 8 HOURS
Status: CANCELLED | OUTPATIENT
Start: 2022-04-14

## 2022-04-14 RX ORDER — ONDANSETRON 2 MG/ML
INJECTION INTRAMUSCULAR; INTRAVENOUS AS NEEDED
Status: DISCONTINUED | OUTPATIENT
Start: 2022-04-14 | End: 2022-04-14 | Stop reason: HOSPADM

## 2022-04-14 RX ORDER — SODIUM CHLORIDE 0.9 % (FLUSH) 0.9 %
5-40 SYRINGE (ML) INJECTION EVERY 8 HOURS
Status: DISCONTINUED | OUTPATIENT
Start: 2022-04-14 | End: 2022-04-14 | Stop reason: HOSPADM

## 2022-04-14 RX ORDER — DIPHENHYDRAMINE HYDROCHLORIDE 50 MG/ML
12.5 INJECTION, SOLUTION INTRAMUSCULAR; INTRAVENOUS AS NEEDED
Status: DISCONTINUED | OUTPATIENT
Start: 2022-04-14 | End: 2022-04-14 | Stop reason: HOSPADM

## 2022-04-14 RX ORDER — HYDROMORPHONE HYDROCHLORIDE 1 MG/ML
0.2 INJECTION, SOLUTION INTRAMUSCULAR; INTRAVENOUS; SUBCUTANEOUS
Status: DISCONTINUED | OUTPATIENT
Start: 2022-04-14 | End: 2022-04-14 | Stop reason: HOSPADM

## 2022-04-14 RX ORDER — SODIUM CHLORIDE 9 MG/ML
25 INJECTION, SOLUTION INTRAVENOUS CONTINUOUS
Status: CANCELLED | OUTPATIENT
Start: 2022-04-14 | End: 2022-04-15

## 2022-04-14 RX ORDER — MIDAZOLAM HYDROCHLORIDE 1 MG/ML
INJECTION, SOLUTION INTRAMUSCULAR; INTRAVENOUS AS NEEDED
Status: DISCONTINUED | OUTPATIENT
Start: 2022-04-14 | End: 2022-04-14 | Stop reason: HOSPADM

## 2022-04-14 RX ORDER — FENTANYL CITRATE 50 UG/ML
INJECTION, SOLUTION INTRAMUSCULAR; INTRAVENOUS AS NEEDED
Status: DISCONTINUED | OUTPATIENT
Start: 2022-04-14 | End: 2022-04-14 | Stop reason: HOSPADM

## 2022-04-14 RX ORDER — SODIUM CHLORIDE 0.9 % (FLUSH) 0.9 %
5-40 SYRINGE (ML) INJECTION AS NEEDED
Status: DISCONTINUED | OUTPATIENT
Start: 2022-04-14 | End: 2022-04-14 | Stop reason: HOSPADM

## 2022-04-14 RX ORDER — DEXAMETHASONE SODIUM PHOSPHATE 4 MG/ML
INJECTION, SOLUTION INTRA-ARTICULAR; INTRALESIONAL; INTRAMUSCULAR; INTRAVENOUS; SOFT TISSUE AS NEEDED
Status: DISCONTINUED | OUTPATIENT
Start: 2022-04-14 | End: 2022-04-14 | Stop reason: HOSPADM

## 2022-04-14 RX ORDER — FENTANYL CITRATE 50 UG/ML
25 INJECTION, SOLUTION INTRAMUSCULAR; INTRAVENOUS
Status: DISCONTINUED | OUTPATIENT
Start: 2022-04-14 | End: 2022-04-14 | Stop reason: HOSPADM

## 2022-04-14 RX ORDER — BUPIVACAINE HYDROCHLORIDE AND EPINEPHRINE 5; 5 MG/ML; UG/ML
INJECTION, SOLUTION EPIDURAL; INTRACAUDAL; PERINEURAL AS NEEDED
Status: DISCONTINUED | OUTPATIENT
Start: 2022-04-14 | End: 2022-04-14 | Stop reason: HOSPADM

## 2022-04-14 RX ORDER — OXYCODONE AND ACETAMINOPHEN 5; 325 MG/1; MG/1
1 TABLET ORAL
Qty: 20 TABLET | Refills: 0 | Status: SHIPPED | OUTPATIENT
Start: 2022-04-14 | End: 2022-04-17

## 2022-04-14 RX ORDER — SODIUM CHLORIDE, SODIUM LACTATE, POTASSIUM CHLORIDE, CALCIUM CHLORIDE 600; 310; 30; 20 MG/100ML; MG/100ML; MG/100ML; MG/100ML
100 INJECTION, SOLUTION INTRAVENOUS CONTINUOUS
Status: DISCONTINUED | OUTPATIENT
Start: 2022-04-14 | End: 2022-04-14 | Stop reason: HOSPADM

## 2022-04-14 RX ORDER — PROPOFOL 10 MG/ML
INJECTION, EMULSION INTRAVENOUS AS NEEDED
Status: DISCONTINUED | OUTPATIENT
Start: 2022-04-14 | End: 2022-04-14 | Stop reason: HOSPADM

## 2022-04-14 RX ORDER — MIDAZOLAM HYDROCHLORIDE 1 MG/ML
0.5 INJECTION, SOLUTION INTRAMUSCULAR; INTRAVENOUS
Status: DISCONTINUED | OUTPATIENT
Start: 2022-04-14 | End: 2022-04-14 | Stop reason: HOSPADM

## 2022-04-14 RX ORDER — GLYCOPYRROLATE 0.2 MG/ML
INJECTION INTRAMUSCULAR; INTRAVENOUS AS NEEDED
Status: DISCONTINUED | OUTPATIENT
Start: 2022-04-14 | End: 2022-04-14 | Stop reason: HOSPADM

## 2022-04-14 RX ORDER — MIDAZOLAM HYDROCHLORIDE 1 MG/ML
1 INJECTION, SOLUTION INTRAMUSCULAR; INTRAVENOUS AS NEEDED
Status: CANCELLED | OUTPATIENT
Start: 2022-04-14

## 2022-04-14 RX ORDER — NEOSTIGMINE METHYLSULFATE 1 MG/ML
INJECTION, SOLUTION INTRAVENOUS AS NEEDED
Status: DISCONTINUED | OUTPATIENT
Start: 2022-04-14 | End: 2022-04-14 | Stop reason: HOSPADM

## 2022-04-14 RX ORDER — ONDANSETRON 2 MG/ML
4 INJECTION INTRAMUSCULAR; INTRAVENOUS AS NEEDED
Status: DISCONTINUED | OUTPATIENT
Start: 2022-04-14 | End: 2022-04-14 | Stop reason: HOSPADM

## 2022-04-14 RX ADMIN — GLYCOPYRROLATE 0.6 MG: 0.2 INJECTION, SOLUTION INTRAMUSCULAR; INTRAVENOUS at 09:46

## 2022-04-14 RX ADMIN — ROCURONIUM BROMIDE 5 MG: 10 SOLUTION INTRAVENOUS at 09:02

## 2022-04-14 RX ADMIN — ONDANSETRON HYDROCHLORIDE 4 MG: 2 INJECTION, SOLUTION INTRAMUSCULAR; INTRAVENOUS at 09:16

## 2022-04-14 RX ADMIN — NEOSTIGMINE METHYLSULFATE 3 MG: 1 INJECTION, SOLUTION INTRAVENOUS at 09:46

## 2022-04-14 RX ADMIN — SODIUM CHLORIDE: 9 INJECTION, SOLUTION INTRAVENOUS at 08:51

## 2022-04-14 RX ADMIN — SODIUM CHLORIDE 25 ML/HR: 9 INJECTION, SOLUTION INTRAVENOUS at 08:46

## 2022-04-14 RX ADMIN — LIDOCAINE HYDROCHLORIDE 100 MG: 20 INJECTION, SOLUTION EPIDURAL; INFILTRATION; INTRACAUDAL; PERINEURAL at 09:02

## 2022-04-14 RX ADMIN — ONDANSETRON HYDROCHLORIDE 4 MG: 2 SOLUTION INTRAMUSCULAR; INTRAVENOUS at 10:26

## 2022-04-14 RX ADMIN — PROPOFOL 30 MG: 10 INJECTION, EMULSION INTRAVENOUS at 09:04

## 2022-04-14 RX ADMIN — MIDAZOLAM 2 MG: 1 INJECTION INTRAMUSCULAR; INTRAVENOUS at 08:51

## 2022-04-14 RX ADMIN — PROPOFOL 50 MG: 10 INJECTION, EMULSION INTRAVENOUS at 09:17

## 2022-04-14 RX ADMIN — Medication 3 G: at 09:08

## 2022-04-14 RX ADMIN — HYDROMORPHONE HYDROCHLORIDE 0.2 MG: 1 INJECTION, SOLUTION INTRAMUSCULAR; INTRAVENOUS; SUBCUTANEOUS at 10:12

## 2022-04-14 RX ADMIN — FENTANYL CITRATE 50 MCG: 50 INJECTION, SOLUTION INTRAMUSCULAR; INTRAVENOUS at 09:02

## 2022-04-14 RX ADMIN — DEXAMETHASONE SODIUM PHOSPHATE 4 MG: 4 INJECTION, SOLUTION INTRAMUSCULAR; INTRAVENOUS at 09:16

## 2022-04-14 RX ADMIN — OXYCODONE 5 MG: 5 TABLET ORAL at 10:29

## 2022-04-14 RX ADMIN — PROPOFOL 100 MG: 10 INJECTION, EMULSION INTRAVENOUS at 09:02

## 2022-04-14 RX ADMIN — SUCCINYLCHOLINE CHLORIDE 160 MG: 20 INJECTION, SOLUTION INTRAMUSCULAR; INTRAVENOUS at 09:02

## 2022-04-14 RX ADMIN — ROCURONIUM BROMIDE 20 MG: 10 SOLUTION INTRAVENOUS at 09:17

## 2022-04-14 NOTE — OP NOTES
[address] [facility]  OPERATIVE REPORT    Name:  Dennie Gentle  MR#:  [id number]  :  1955  ACCOUNT #:  [de-identified]  DATE OF SERVICE:  2022      PREOPERATIVE DIAGNOSIS:  Renal failure. POSTOPERATIVE DIAGNOSIS:  Renal failure. PROCEDURE PERFORMED:  Laparoscopic insertion of peritoneal dialysis catheter. SURGEON:  Gee Carlos MD    ASSISTANT:  Elizabeth    ANESTHESIA:  General.    COMPLICATIONS:  None. SPECIMENS REMOVED:  None. IMPLANTS:  Peritoneal dialysis catheter. ESTIMATED BLOOD LOSS:  Minimal.    INDICATIONS:  The patient is a 68-year-old female with end-stage renal disease, on hemodialysis. She would like to change to peritoneal dialysis and a PD catheter will be placed. She is obese and has had several previous abdominal operations including an open cholecystectomy, appendectomy, hysterectomy, and supraumbilical hernia repair. PROCEDURE:  The patient's abdomen was prepped and draped. An insufflation needle was inserted and a pneumoperitoneum was created through a small puncture incision in the left upper quadrant. A 5-mm trocar and laparoscopic camera were inserted under direct vision. There were significant intraperitoneal adhesions to the abdominal wall anteriorly. This precluded placement of a catheter on the left as the adhesions sealed off the pelvis. On the right, there was more of an opening in to the pelvis, though it was not completely free of adhesions. It was felt that it was reasonable to try placement of a catheter on the right side in to the mid pelvis. A transverse incision was made just to the right of the umbilicus. Incision was carried down to the anterior rectus sheath which was opened transversely. There was minimal rectus muscle and this was split in the direction of its fibers. The posterior rectus sheath was identified and a 2-0 Prolene pursestring suture placed. Peritoneal cavity was entered.   A swan-neck curl tip double cuff catheter was inserted over a stylet and directed in to the pelvis. The pursestring suture was secured and the catheter was brought out through a separate small exit site in the anterior rectus sheath superior to the previous opening. The rectus sheath was closed with running 0 Prolene. The catheter was then brought out through an inferior skin exit site. The laparoscopic camera was reinserted. A second 5-mm trocar was then placed in the right lateral abdomen. The catheter was grasped and placed in to the mid pelvis inferior to the upper abdominal adhesions, but still not in to the free true pelvis which also was blocked by adhesions. The pneumoperitoneum was released and the incision was closed with Vicryl subcutaneous suture and Vicryl subcuticular skin closure. Dressings were applied and the patient was returned to the recovery room in stable condition.       MD KAELA [email or web address]  D:  04/14/2022 9:59  T:  04/14/2022 14:44  JOB #:  [id number]

## 2022-04-14 NOTE — ANESTHESIA PREPROCEDURE EVALUATION
Relevant Problems   RESPIRATORY SYSTEM   (+) JASEN on CPAP   (+) Sleep apnea      CARDIOVASCULAR   (+) Coronary artery disease   (+) HTN (hypertension)   (+) PVC (premature ventricular contraction)      GASTROINTESTINAL   (+) GERD (gastroesophageal reflux disease)      RENAL FAILURE   (+) ESRD (end stage renal disease) (HCC)   (+) End stage renal failure on dialysis (HCC)   (+) Nephrolithiasis      ENDOCRINE   (+) DM (diabetes mellitus), type 2 with renal complications (HCC)   (+) DM type 2 causing renal disease (HCC)   (+) DM type 2 causing vascular disease (HCC)   (+) Morbid obesity      HEMATOLOGY   (+) Normocytic anemia       Anesthetic History   No history of anesthetic complications            Review of Systems / Medical History  Patient summary reviewed, nursing notes reviewed and pertinent labs reviewed    Pulmonary        Sleep apnea: CPAP           Neuro/Psych   Within defined limits           Cardiovascular    Hypertension      CHF  Dysrhythmias   CAD         GI/Hepatic/Renal     GERD    Renal disease: ESRD and dialysis       Endo/Other    Diabetes: type 2    Morbid obesity and arthritis     Other Findings              Physical Exam    Airway  Mallampati: III  TM Distance: > 6 cm  Neck ROM: normal range of motion   Mouth opening: Normal     Cardiovascular    Rhythm: regular  Rate: normal         Dental    Dentition: Edentulous     Pulmonary  Breath sounds clear to auscultation               Abdominal         Other Findings            Anesthetic Plan    ASA: 3  Anesthesia type: general          Induction: Intravenous  Anesthetic plan and risks discussed with: Patient

## 2022-04-14 NOTE — ROUTINE PROCESS
Patient: Johnathan Sebastian MRN: [id number]  SSN: [id number]   YOB: 1955  Age: 61 y.o. Sex: female     Patient is status post Procedure(s):  PERITONEAL DIAYLSIS CATHETER INSERTION. Surgeon(s) and Role:     * Bhavin Vásquez MD - Primary    Local/Dose/Irrigation:  See [facility]                  Peripheral IV 04/14/22 Right Antecubital (Active)   Site Assessment Clean, dry, & intact 04/14/22 0842   Phlebitis Assessment 0 04/14/22 0842   Infiltration Assessment 0 04/14/22 0842   Dressing Status Clean, dry, & intact; New 04/14/22 0842   Dressing Type Transparent;Tape 04/14/22 0842   Hub Color/Line Status Pink 04/14/22 0842            Airway - Endotracheal Tube 04/14/22 Oral (Active)                   Dressing/Packing:  Incision 04/14/22 Abdomen Anterior-Dressing/Treatment: Skin glue;Gauze dressing/dressing sponge;Tape/Soft cloth adhesive tape (04/14/22 0943)    Splint/Cast:  ]    Other:

## 2022-04-14 NOTE — PROGRESS NOTES
Discharge instructions and medications reviewed with patient and spouse. Opportunity was given for patient and responsible party to ask questions. No further questions at this time. Responsible party and patient verbalizes understanding of discharge instructions. A Copy of discharge instructions given to patient. Patient discharged with all belongings.

## 2022-04-14 NOTE — ANESTHESIA POSTPROCEDURE EVALUATION
Post-Anesthesia Evaluation and Assessment    Patient: Teretha Severe MRN: [id number]  SSN: [id number]    YOB: 1955  Age: 61 y.o. Sex: female      I have evaluated the patient and they are stable and ready for discharge from the PACU. Cardiovascular Function/Vital Signs  Visit Vitals  BP (!) 119/56   Pulse (!) 56   Temp 36.7 °C (98 °F)   Resp 19   Ht 5' 10\" (1.778 m)   Wt 127 kg (279 lb 15.8 oz)   SpO2 100%   BMI 40.17 kg/m²       Patient is status post General anesthesia for Procedure(s):  PERITONEAL DIAYLSIS CATHETER INSERTION. Nausea/Vomiting: None    Postoperative hydration reviewed and adequate. Pain:  Pain Scale 1: Visual (04/14/22 1030)  Pain Intensity 1: 0 (04/14/22 1030)   Managed    Neurological Status:   Neuro (WDL): Within Defined Limits (04/14/22 1030)  Neuro  Neurologic State: Alert (04/14/22 1030)  LUE Motor Response: Purposeful (04/14/22 1030)  LLE Motor Response: Purposeful (04/14/22 1030)  RUE Motor Response: Purposeful (04/14/22 1030)  RLE Motor Response: Purposeful (04/14/22 1030)   At baseline    Mental Status, Level of Consciousness: Alert and  oriented to person, place, and time    Pulmonary Status:   O2 Device: Nasal cannula (04/14/22 1030)   Adequate oxygenation and airway patent    Complications related to anesthesia: None    Post-anesthesia assessment completed. No concerns    Signed By: Nikkie Mari MD     April 14, 2022              Procedure(s):  PERITONEAL DIAYLSIS CATHETER INSERTION. general    <BSHSIANPOST>    INITIAL Post-op Vital signs:   Vitals Value Taken Time   /61 04/14/22 1030   Temp 36.7 °C (98 °F) 04/14/22 1001   Pulse 57 04/14/22 1033   Resp 18 04/14/22 1033   SpO2 100 % 04/14/22 1033   Vitals shown include unvalidated device data.

## 2022-04-14 NOTE — DISCHARGE INSTRUCTIONS
Patient Discharge Instructions    Elliott Mcnamara / [id number] : 1955    Admitted 2022 Discharged: 2022     Take Home Medications     · It is important that you take the medication exactly as they are prescribed. · Keep your medication in the bottles provided by the pharmacist and keep a list of the medication names, dosages, and times to be taken in your wallet. · Do not take other medications without consulting your doctor. What to do at Home    Recommended diet: Renal Diet,     Recommended activity: Activity as tolerated,     Additional Instructions: leave dressing dry and intact, arrange follow up with PD nurse ASAP, restart coumadin today    Follow-up with Dr Afshin Diamond next week on Tuesday, call [phone] for appt        Information obtained by :  I understand that if any problems occur once I am at home I am to contact my physician. I understand and acknowledge receipt of the instructions indicated above. Physician's or R.N.'s Signature                                                                  Date/Time                                                                                                                                              Patient or Representative Signature                                                          Date/Time      ______________________________________________________________________    Anesthesia Discharge Instructions    After general anesthesia or intervenous sedation, for 24 hours or while taking prescription Narcotics:  · Limit your activities  · Do not drive or operate hazardous machinery  · If you have not urinated within 8 hours after discharge, please contact your surgeon on call.   · Do not make important personal or business decisions  · Do not drink alcoholic beverages    Report the following to your surgeon:  · Excessive pain, swelling, redness or odor of or around the surgical area  · Temperature over 100.5 degrees  · Nausea and vomiting lasting longer than 4 hours or if unable to take medication  · Any signs of decreased circulation or nerve impairment to extremity:  Change in color, persistent numbness, tingling, coldness or increased pain.   · Any questions

## 2022-04-14 NOTE — BRIEF OP NOTE
Brief Postoperative Note    Patient: Elizabeth Pierre  YOB: 1955  MRN: [id number]    Date of Procedure: 4/14/2022     Pre-Op Diagnosis: ESRD    Post-Op Diagnosis: Same as preoperative diagnosis.       Procedure(s):  LAPAROSCOPIC PERITONEAL DIALYSIS CATHETER INSERTION    Surgeon(s):  Sanam Crespo MD    Surgical Assistant: Surg Asst-1: Sven Fine    Anesthesia: General     Estimated Blood Loss (mL): Minimal    Complications: None    Specimens: * No specimens in log *     Implants: * No implants in log *    Drains: * No LDAs found *    Findings: pelvic adhesions    Electronically Signed by Mary Dickinson MD on 4/14/2022 at 9:59 AM

## 2022-05-05 ENCOUNTER — OFFICE VISIT (OUTPATIENT)
Dept: FAMILY MEDICINE CLINIC | Age: 67
End: 2022-05-05
Payer: MEDICARE

## 2022-05-05 VITALS
TEMPERATURE: 98.5 F | BODY MASS INDEX: 40.15 KG/M2 | DIASTOLIC BLOOD PRESSURE: 64 MMHG | OXYGEN SATURATION: 97 % | HEART RATE: 65 BPM | SYSTOLIC BLOOD PRESSURE: 124 MMHG | WEIGHT: 280.43 LBS | HEIGHT: 70 IN | RESPIRATION RATE: 16 BRPM

## 2022-05-05 DIAGNOSIS — G89.29 CHRONIC PAIN OF RIGHT KNEE: ICD-10-CM

## 2022-05-05 DIAGNOSIS — I25.118 CORONARY ARTERY DISEASE OF NATIVE ARTERY OF NATIVE HEART WITH STABLE ANGINA PECTORIS (HCC): ICD-10-CM

## 2022-05-05 DIAGNOSIS — R07.9 CHRONIC CHEST PAIN: ICD-10-CM

## 2022-05-05 DIAGNOSIS — I25.118 ATHEROSCLEROSIS OF NATIVE CORONARY ARTERY OF NATIVE HEART WITH STABLE ANGINA PECTORIS (HCC): Chronic | ICD-10-CM

## 2022-05-05 DIAGNOSIS — G89.29 CHRONIC CHEST PAIN: ICD-10-CM

## 2022-05-05 DIAGNOSIS — M25.561 CHRONIC PAIN OF RIGHT KNEE: ICD-10-CM

## 2022-05-05 DIAGNOSIS — Z51.81 ENCOUNTER FOR MONITORING COUMADIN THERAPY: Primary | ICD-10-CM

## 2022-05-05 DIAGNOSIS — Z79.01 ENCOUNTER FOR MONITORING COUMADIN THERAPY: Primary | ICD-10-CM

## 2022-05-05 DIAGNOSIS — M54.50 CHRONIC BILATERAL LOW BACK PAIN, UNSPECIFIED WHETHER SCIATICA PRESENT: ICD-10-CM

## 2022-05-05 DIAGNOSIS — M10.372 ACUTE GOUT DUE TO RENAL IMPAIRMENT INVOLVING LEFT FOOT: ICD-10-CM

## 2022-05-05 DIAGNOSIS — G89.29 CHRONIC BILATERAL LOW BACK PAIN, UNSPECIFIED WHETHER SCIATICA PRESENT: ICD-10-CM

## 2022-05-05 LAB
INR BLD: 2.9
PT POC: 34.9 SECONDS
VALID INTERNAL CONTROL?: YES

## 2022-05-05 PROCEDURE — G9231 DOC ESRD DIA TRANS PREG: HCPCS | Performed by: FAMILY MEDICINE

## 2022-05-05 PROCEDURE — 85610 PROTHROMBIN TIME: CPT | Performed by: FAMILY MEDICINE

## 2022-05-05 PROCEDURE — 99214 OFFICE O/P EST MOD 30 MIN: CPT | Performed by: FAMILY MEDICINE

## 2022-05-05 PROCEDURE — G8432 DEP SCR NOT DOC, RNG: HCPCS | Performed by: FAMILY MEDICINE

## 2022-05-05 PROCEDURE — 3017F COLORECTAL CA SCREEN DOC REV: CPT | Performed by: FAMILY MEDICINE

## 2022-05-05 PROCEDURE — G8417 CALC BMI ABV UP PARAM F/U: HCPCS | Performed by: FAMILY MEDICINE

## 2022-05-05 PROCEDURE — G8427 DOCREV CUR MEDS BY ELIG CLIN: HCPCS | Performed by: FAMILY MEDICINE

## 2022-05-05 PROCEDURE — G0463 HOSPITAL OUTPT CLINIC VISIT: HCPCS | Performed by: FAMILY MEDICINE

## 2022-05-05 RX ORDER — OXYCODONE AND ACETAMINOPHEN 10; 325 MG/1; MG/1
1 TABLET ORAL
Qty: 90 TABLET | Refills: 0 | Status: SHIPPED | OUTPATIENT
Start: 2022-07-06 | End: 2022-07-28 | Stop reason: SDUPTHER

## 2022-05-05 RX ORDER — OXYCODONE AND ACETAMINOPHEN 10; 325 MG/1; MG/1
1 TABLET ORAL
Qty: 90 TABLET | Refills: 0 | Status: SHIPPED | OUTPATIENT
Start: 2022-06-06 | End: 2022-05-28

## 2022-05-05 RX ORDER — ALLOPURINOL 100 MG/1
TABLET ORAL
Qty: 90 TABLET | Refills: 3 | Status: SHIPPED | OUTPATIENT
Start: 2022-05-05

## 2022-05-05 RX ORDER — ISOSORBIDE MONONITRATE 120 MG/1
60 TABLET, EXTENDED RELEASE ORAL
Qty: 45 TABLET | Refills: 3 | Status: SHIPPED | OUTPATIENT
Start: 2022-05-05 | End: 2022-09-10

## 2022-05-05 RX ORDER — OXYCODONE AND ACETAMINOPHEN 10; 325 MG/1; MG/1
1 TABLET ORAL
Qty: 90 TABLET | Refills: 0 | Status: SHIPPED | OUTPATIENT
Start: 2022-05-07 | End: 2022-05-28

## 2022-05-05 RX ORDER — ATORVASTATIN CALCIUM 80 MG/1
TABLET, FILM COATED ORAL
Qty: 90 TABLET | Refills: 3 | Status: SHIPPED | OUTPATIENT
Start: 2022-05-05

## 2022-05-05 NOTE — PROGRESS NOTES
Progress Note    she is a 61y.o. year old female who presents for evalution. Subjective:     Pt here for INR check and is currently therapeutic at 2.9 was 3.2 when she went for PD catheter and she held a couple days. No bleeding issues. Patient needs a refill of her Percocet 10/325 she states this is working better make the pain more tolerable but is still severely limited by her pain.  Patient is chronic chest pain from pulmonary embolisms and coronary artery disease.  Chronic knee pain she is going to require revision of her right knee since the replacement has failed and plans to see her orthopedic soon due to this decrease in quality of life.  Chronic left shoulder pain as well.  Chronic low back pain chronic neck pain.  She does also use Flexeril which helps she takes at night.  Uses Voltaren gel as needed which provides minimal relief.  No history of abuse or misuse of medication.  Will bring her pain to about a 3 or 4 from an 8 out of 10.  Helps with quality life.  Activities limited due to failed right knee replacement however she does remain as active as she can. Plan for kne replacement revision she is hopeful for not too distant future.       Opioid/Pain Management:     1.  Has the patient signed a pain contract for chronic narcotic use? yes  2.  Has the patient had a UDS or Serum screen as per guidelines as per [facility]?: [facility] 12/21  3.  Does patient meet necessary guidelines for Naloxone treatement per the [facility]? [place]  4.  Has the Prescription Monitoring Program been reviewed?  yes  5.  Does patient have a long term condition that requires long term use of a Narcotic?  yes  6.  Has patient been tolerant of therapy and responsible to routine follow up and specialist follow-up?  Yes      Needs some refills, forgot to ask cardiology when she was there the other day. Reviewed PmHx, RxHx, FmHx, SocHx, AllgHx and updated and dated in the chart.     Review of Systems - negative except as listed above in the HPI    Objective:     Vitals:    05/05/22 1125   BP: 124/64   Pulse: 65   Resp: 16   Temp: 98.5 °F (36.9 °C)   TempSrc: Oral   SpO2: 97%   Weight: 280 lb 6.8 oz (127.2 kg)   Height: 5' 10\" (1.778 m)       Current Outpatient Medications   Medication Sig    isosorbide mononitrate ER (IMDUR) 120 mg CR tablet Take 0.5 Tablets by mouth every morning.  atorvastatin (LIPITOR) 80 mg tablet TAKE 1 TABLET BY MOUTH ONCE DAILY NIGHTLY    allopurinoL (ZYLOPRIM) 100 mg tablet Take 1 tablet by mouth once daily    [START ON 7/6/2022] oxyCODONE-acetaminophen (PERCOCET 10)  mg per tablet Take 1 Tablet by mouth every eight (8) hours as needed for Pain for up to 30 days. Max Daily Amount: 3 Tablets.  [START ON 6/6/2022] oxyCODONE-acetaminophen (PERCOCET 10)  mg per tablet Take 1 Tablet by mouth every eight (8) hours as needed for Pain for up to 30 days. Max Daily Amount: 3 Tablets.  [START ON 5/7/2022] oxyCODONE-acetaminophen (PERCOCET 10)  mg per tablet Take 1 Tablet by mouth every eight (8) hours as needed for Pain for up to 30 days. Max Daily Amount: 3 Tablets.  lubiPROStone (Amitiza) 24 mcg capsule Take 24 mcg by mouth two (2) times daily (with meals).  warfarin (COUMADIN) 2.5 mg tablet 1 tab PO 4 days per week.  warfarin (COUMADIN) 3 mg tablet 1 tab PO qday x 3 days a week    pantoprazole (Protonix) 40 mg tablet Take 1 Tablet by mouth daily. (Patient taking differently: Take 40 mg by mouth two (2) times a day.)    sucralfate (CARAFATE) 1 gram tablet Take 1 tablet by mouth 4 times daily    carvediloL (COREG) 25 mg tablet TAKE 1 TABLET BY MOUTH TWICE DAILY WITH MEALS    triamcinolone acetonide (KENALOG) 0.1 % topical cream Apply  to affected area two (2) times a day. use thin layer    docusate sodium (STOOL SOFTENER PO) Take  by mouth daily.     nitroglycerin (NITROSTAT) 0.4 mg SL tablet 1 Tab by SubLINGual route every five (5) minutes as needed for Chest Pain. Up to 3 doses.  naloxone (NARCAN) 4 mg/actuation nasal spray Use 1 spray intranasally, then discard. Repeat with new spray every 2 min as needed for opioid overdose symptoms, alternating nostrils.  albuterol (PROAIR HFA) 90 mcg/actuation inhaler Take 2 Puffs by inhalation every four (4) hours as needed for Wheezing.  Oxygen O2 2L NC 24/7    CALCITRIOL PO Take 0.5 mg by mouth. (Patient not taking: Reported on 4/14/2022)    cyclobenzaprine (FLEXERIL) 5 mg tablet Take 1 Tablet by mouth nightly as needed for Muscle Spasm(s). (Patient not taking: Reported on 4/14/2022)    ergocalciferol (ERGOCALCIFEROL) 1,250 mcg (50,000 unit) capsule TAKE 1 CAPSULE BY MOUTH EVERY TUESDAY (Patient not taking: Reported on 4/14/2022)    mupirocin (BACTROBAN) 2 % ointment Apply  to affected area two (2) times a day. (Patient not taking: Reported on 4/14/2022)     No current facility-administered medications for this visit. Physical Examination: General appearance - alert, well appearing, and in no distress  Chest - clear to auscultation, no wheezes, rales or rhonchi, symmetric air entry  Heart - normal rate, regular rhythm, normal S1, S2, no murmurs, rubs, clicks or gallops  Musculoskeletal - requires scooter for mobility      Assessment/ Plan:   Diagnoses and all orders for this visit:    1. Encounter for monitoring Coumadin therapy  -     AMB POC PT/INR  @ goal no change recheck 4 weeks  2. Atherosclerosis of native coronary artery of native heart with stable angina pectoris (HCC)  -     atorvastatin (LIPITOR) 80 mg tablet; TAKE 1 TABLET BY MOUTH ONCE DAILY NIGHTLY    3. Coronary artery disease of native artery of native heart with stable angina pectoris (HCC)  -     isosorbide mononitrate ER (IMDUR) 120 mg CR tablet;  Take 0.5 Tablets by mouth every morning.  -     atorvastatin (LIPITOR) 80 mg tablet; TAKE 1 TABLET BY MOUTH ONCE DAILY NIGHTLY    4. Acute gout due to renal impairment involving left foot  -     allopurinoL (ZYLOPRIM) 100 mg tablet; Take 1 tablet by mouth once daily    5. Chronic chest pain  -     oxyCODONE-acetaminophen (PERCOCET 10)  mg per tablet; Take 1 Tablet by mouth every eight (8) hours as needed for Pain for up to 30 days. Max Daily Amount: 3 Tablets. -     oxyCODONE-acetaminophen (PERCOCET 10)  mg per tablet; Take 1 Tablet by mouth every eight (8) hours as needed for Pain for up to 30 days. Max Daily Amount: 3 Tablets. -     oxyCODONE-acetaminophen (PERCOCET 10)  mg per tablet; Take 1 Tablet by mouth every eight (8) hours as needed for Pain for up to 30 days. Max Daily Amount: 3 Tablets. 6. Chronic bilateral low back pain, unspecified whether sciatica present  -     oxyCODONE-acetaminophen (PERCOCET 10)  mg per tablet; Take 1 Tablet by mouth every eight (8) hours as needed for Pain for up to 30 days. Max Daily Amount: 3 Tablets. -     oxyCODONE-acetaminophen (PERCOCET 10)  mg per tablet; Take 1 Tablet by mouth every eight (8) hours as needed for Pain for up to 30 days. Max Daily Amount: 3 Tablets. -     oxyCODONE-acetaminophen (PERCOCET 10)  mg per tablet; Take 1 Tablet by mouth every eight (8) hours as needed for Pain for up to 30 days. Max Daily Amount: 3 Tablets. 7. Chronic pain of right knee  -     oxyCODONE-acetaminophen (PERCOCET 10)  mg per tablet; Take 1 Tablet by mouth every eight (8) hours as needed for Pain for up to 30 days. Max Daily Amount: 3 Tablets. -     oxyCODONE-acetaminophen (PERCOCET 10)  mg per tablet; Take 1 Tablet by mouth every eight (8) hours as needed for Pain for up to 30 days. Max Daily Amount: 3 Tablets. -     oxyCODONE-acetaminophen (PERCOCET 10)  mg per tablet; Take 1 Tablet by mouth every eight (8) hours as needed for Pain for up to 30 days. Max Daily Amount: 3 Tablets.        Follow-up and Dispositions    · Return in about 4 weeks (around 6/2/2022), or if symptoms worsen or fail to improve, for INR check. I have discussed the diagnosis with the patient and the intended plan as seen in the above orders. The patient has received an after-visit summary and questions were answered concerning future plans. Pt conveyed understanding of plan.     Medication Side Effects and Warnings were discussed with patient    An electronic signature was used to authenticate this note  Phillip Quintanilla DO

## 2022-05-05 NOTE — PROGRESS NOTES
Chief Complaint   Patient presents with    Follow-up     INR     Patient presents in office today for INR check. No concerns. 1. Have you been to the ER, urgent care clinic since your last visit? Hospitalized since your last visit? No    2. Have you seen or consulted any other health care providers outside of the [address] [place] [address] since your last visit? Include any pap smears or colon screening.  No    Learning Assessment 6/28/2019   PRIMARY LEARNER Patient   PRIMARY LANGUAGE ENGLISH   LEARNER PREFERENCE PRIMARY LISTENING   ANSWERED BY patient    RELATIONSHIP SELF

## 2022-05-05 NOTE — PATIENT INSTRUCTIONS
A Healthy Lifestyle: Care Instructions  Your Care Instructions     A healthy lifestyle can help you feel good, stay at a healthy weight, and have plenty of energy for both work and play. A healthy lifestyle is something you can share with your whole family. A healthy lifestyle also can lower your risk for serious health problems, such as high blood pressure, heart disease, and diabetes. You can follow a few steps listed below to improve your health and the health of your family. Follow-up care is a key part of your treatment and safety. Be sure to make and go to all appointments, and call your doctor if you are having problems. It's also a good idea to know your test results and keep a list of the medicines you take. How can you care for yourself at home? · Do not eat too much sugar, fat, or fast foods. You can still have dessert and treats now and then. The goal is moderation. · Start small to improve your eating habits. Pay attention to portion sizes, drink less juice and soda pop, and eat more fruits and vegetables. ? Eat a healthy amount of food. A 3-ounce serving of meat, for example, is about the size of a deck of cards. Fill the rest of your plate with vegetables and whole grains. ? Limit the amount of soda and sports drinks you have every day. Drink more water when you are thirsty. ? Eat plenty of fruits and vegetables every day. Have an apple or some carrot sticks as an afternoon snack instead of a candy bar. Try to have fruits and/or vegetables at every meal.  · Make exercise part of your daily routine. You may want to start with simple activities, such as walking, bicycling, or slow swimming. Try to be active 30 to 60 minutes every day. You do not need to do all 30 to 60 minutes all at once. For example, you can exercise 3 times a day for 10 or 20 minutes.  Moderate exercise is safe for most people, but it is always a good idea to talk to your doctor before starting an exercise program.  · Keep moving. Lila Peng the lawn, work in the garden, or [employer]. Take the stairs instead of the elevator at work. · If you smoke, quit. People who smoke have an increased risk for heart attack, stroke, cancer, and other lung illnesses. Quitting is hard, but there are ways to boost your chance of quitting tobacco for good. ? Use nicotine gum, patches, or lozenges. ? Ask your doctor about stop-smoking programs and medicines. ? Keep trying. In addition to reducing your risk of diseases in the future, you will notice some benefits soon after you stop using tobacco. If you have shortness of breath or asthma symptoms, they will likely get better within a few weeks after you quit. · Limit how much alcohol you drink. Moderate amounts of alcohol (up to 2 drinks a day for men, 1 drink a day for women) are okay. But drinking too much can lead to liver problems, high blood pressure, and other health problems. Family health  If you have a family, there are many things you can do together to improve your health. · Eat meals together as a family as often as possible. · Eat healthy foods. This includes fruits, vegetables, lean meats and dairy, and whole grains. · Include your family in your fitness plan. Most people think of activities such as jogging or tennis as the way to fitness, but there are many ways you and your family can be more active. Anything that makes you breathe hard and gets your heart pumping is exercise. Here are some tips:  ? Walk to do errands or to take your child to school or the bus.  ? Go for a family bike ride after dinner instead of watching TV. Where can you learn more? Go to http://www.gray.com/  Enter Q205 in the search box to learn more about \"A Healthy Lifestyle: Care Instructions. \"  Current as of: June 16, 2021               Content Version: 13.2  © 0581-9954 Healthwise, Incorporated.    Care instructions adapted under license by [employer] (which disclaims liability or warranty for this information). If you have questions about a medical condition or this instruction, always ask your healthcare professional. [address] any warranty or liability for your use of this information.

## 2022-05-24 DIAGNOSIS — I26.99 OTHER PULMONARY EMBOLISM WITHOUT ACUTE COR PULMONALE, UNSPECIFIED CHRONICITY (HCC): ICD-10-CM

## 2022-05-26 RX ORDER — WARFARIN 2.5 MG/1
TABLET ORAL
Qty: 45 TABLET | Refills: 0 | Status: SHIPPED | OUTPATIENT
Start: 2022-05-26 | End: 2022-06-08

## 2022-05-28 ENCOUNTER — APPOINTMENT (OUTPATIENT)
Dept: CT IMAGING | Age: 67
End: 2022-05-28
Attending: EMERGENCY MEDICINE
Payer: MEDICARE

## 2022-05-28 ENCOUNTER — HOSPITAL ENCOUNTER (EMERGENCY)
Age: 67
Discharge: HOME | End: 2022-05-28
Attending: EMERGENCY MEDICINE
Payer: MEDICARE

## 2022-05-28 VITALS
OXYGEN SATURATION: 96 % | RESPIRATION RATE: 20 BRPM | TEMPERATURE: 98.3 F | SYSTOLIC BLOOD PRESSURE: 132 MMHG | BODY MASS INDEX: 40.24 KG/M2 | DIASTOLIC BLOOD PRESSURE: 60 MMHG | HEART RATE: 65 BPM | HEIGHT: 70 IN

## 2022-05-28 DIAGNOSIS — K29.90 GASTRITIS AND DUODENITIS: ICD-10-CM

## 2022-05-28 DIAGNOSIS — K31.84 GASTROPARESIS: Primary | ICD-10-CM

## 2022-05-28 DIAGNOSIS — R11.2 NAUSEA AND VOMITING IN ADULT: ICD-10-CM

## 2022-05-28 LAB
ALBUMIN SERPL-MCNC: 3.7 G/DL (ref 3.5–5.2)
ALBUMIN/GLOB SERPL: 1.1 {RATIO} (ref 1.1–2.2)
ALP SERPL-CCNC: 96 U/L (ref 35–104)
ALT SERPL-CCNC: <5 U/L (ref 10–35)
ANION GAP SERPL CALC-SCNC: 19 MMOL/L (ref 5–15)
AST SERPL-CCNC: 8 U/L (ref 10–35)
BASOPHILS # BLD: 0.1 K/UL (ref 0–0.1)
BASOPHILS NFR BLD: 1 % (ref 0–1)
BILIRUB SERPL-MCNC: 0.5 MG/DL (ref 0.2–1)
BUN SERPL-MCNC: 57 MG/DL (ref 8–23)
BUN/CREAT SERPL: 4 (ref 12–20)
CALCIUM SERPL-MCNC: 9.2 MG/DL (ref 8.8–10.2)
CHLORIDE SERPL-SCNC: 102 MMOL/L (ref 98–107)
CO2 SERPL-SCNC: 22 MMOL/L (ref 22–29)
COMMENT, HOLDF: NORMAL
CREAT SERPL-MCNC: 16.16 MG/DL (ref 0.5–0.9)
DIFFERENTIAL METHOD BLD: ABNORMAL
EOSINOPHIL # BLD: 0.4 K/UL (ref 0–0.4)
EOSINOPHIL NFR BLD: 4 % (ref 0–7)
ERYTHROCYTE [DISTWIDTH] IN BLOOD BY AUTOMATED COUNT: 15.9 % (ref 11.5–14.5)
GLOBULIN SER CALC-MCNC: 3.4 G/DL (ref 2–4)
GLUCOSE SERPL-MCNC: 102 MG/DL (ref 65–100)
HCT VFR BLD AUTO: 32.3 % (ref 35–47)
HGB BLD-MCNC: 10.5 G/DL (ref 11.5–16)
IMM GRANULOCYTES # BLD AUTO: 0.1 K/UL (ref 0–0.04)
IMM GRANULOCYTES NFR BLD AUTO: 1 % (ref 0–0.5)
LIPASE SERPL-CCNC: 26 U/L (ref 13–60)
LYMPHOCYTES # BLD: 2.1 K/UL (ref 0.8–3.5)
LYMPHOCYTES NFR BLD: 20 % (ref 12–49)
MCH RBC QN AUTO: 31.3 PG (ref 26–34)
MCHC RBC AUTO-ENTMCNC: 32.5 G/DL (ref 30–36.5)
MCV RBC AUTO: 96.1 FL (ref 80–99)
MONOCYTES # BLD: 0.8 K/UL (ref 0–1)
MONOCYTES NFR BLD: 8 % (ref 5–13)
NEUTS SEG # BLD: 7 K/UL (ref 1.8–8)
NEUTS SEG NFR BLD: 67 % (ref 32–75)
NRBC # BLD: 0.02 K/UL (ref 0–0.01)
NRBC BLD-RTO: 0.2 PER 100 WBC
PLATELET # BLD AUTO: 268 K/UL (ref 150–400)
PMV BLD AUTO: 10.2 FL (ref 8.9–12.9)
POTASSIUM SERPL-SCNC: 3.1 MMOL/L (ref 3.5–5.1)
PROT SERPL-MCNC: 7.1 G/DL (ref 6.4–8.3)
RBC # BLD AUTO: 3.36 M/UL (ref 3.8–5.2)
SAMPLES BEING HELD,HOLD: NORMAL
SODIUM SERPL-SCNC: 143 MMOL/L (ref 136–145)
WBC # BLD AUTO: 10.5 K/UL (ref 3.6–11)

## 2022-05-28 PROCEDURE — 99284 EMERGENCY DEPT VISIT MOD MDM: CPT

## 2022-05-28 PROCEDURE — 93005 ELECTROCARDIOGRAM TRACING: CPT

## 2022-05-28 PROCEDURE — 74176 CT ABD & PELVIS W/O CONTRAST: CPT

## 2022-05-28 PROCEDURE — 85025 COMPLETE CBC W/AUTO DIFF WBC: CPT

## 2022-05-28 PROCEDURE — 83690 ASSAY OF LIPASE: CPT

## 2022-05-28 PROCEDURE — 96375 TX/PRO/DX INJ NEW DRUG ADDON: CPT

## 2022-05-28 PROCEDURE — 80053 COMPREHEN METABOLIC PANEL: CPT

## 2022-05-28 PROCEDURE — 96374 THER/PROPH/DIAG INJ IV PUSH: CPT

## 2022-05-28 PROCEDURE — 74011000250 HC RX REV CODE- 250: Performed by: EMERGENCY MEDICINE

## 2022-05-28 PROCEDURE — 74011250636 HC RX REV CODE- 250/636: Performed by: EMERGENCY MEDICINE

## 2022-05-28 PROCEDURE — 36415 COLL VENOUS BLD VENIPUNCTURE: CPT

## 2022-05-28 PROCEDURE — 74011250637 HC RX REV CODE- 250/637: Performed by: EMERGENCY MEDICINE

## 2022-05-28 RX ORDER — ONDANSETRON 2 MG/ML
4 INJECTION INTRAMUSCULAR; INTRAVENOUS
Status: COMPLETED | OUTPATIENT
Start: 2022-05-28 | End: 2022-05-28

## 2022-05-28 RX ORDER — ONDANSETRON 4 MG/1
4 TABLET, ORALLY DISINTEGRATING ORAL
Qty: 30 TABLET | Refills: 0 | Status: SHIPPED | OUTPATIENT
Start: 2022-05-28

## 2022-05-28 RX ORDER — FENTANYL CITRATE 50 UG/ML
50 INJECTION, SOLUTION INTRAMUSCULAR; INTRAVENOUS ONCE
Status: COMPLETED | OUTPATIENT
Start: 2022-05-28 | End: 2022-05-28

## 2022-05-28 RX ADMIN — ONDANSETRON 4 MG: 2 INJECTION INTRAMUSCULAR; INTRAVENOUS at 08:31

## 2022-05-28 RX ADMIN — FENTANYL CITRATE 50 MCG: 50 INJECTION, SOLUTION INTRAMUSCULAR; INTRAVENOUS at 08:54

## 2022-05-28 RX ADMIN — LIDOCAINE HYDROCHLORIDE 40 ML: 20 SOLUTION ORAL; TOPICAL at 08:31

## 2022-05-28 RX ADMIN — SODIUM CHLORIDE 1000 ML: 9 INJECTION, SOLUTION INTRAVENOUS at 08:31

## 2022-05-28 NOTE — ED NOTES
Pt discharged with instructions and/or prescriptions reviewed and verbally understood. Pt ambulatory and stable upon discharge and left with .

## 2022-05-28 NOTE — ED PROVIDER NOTES
61 y.o. female with history of diabetic gastroparesis, peritoneal dialysis, as well as below, presents to the ED by EMS complaining of nausea and vomiting x 1 week. She was afebrile with vital signs stable throughout transport by EMS. Arrives stating that her symptoms are similar to her prior gastroparesis symptoms. States that she has not had issues with her gastroparesis in quite some time. She states that she notes chronic upper abdominal pain and about a month ago had an EGD by her gastroenterologist which showed evidence of gastritis and hiatal hernia. States that she has been compliant with her medication regimen, but taking her chronic pain medication for the last several days that she has been constipated for 4 days. On arrival to the emergency department she states that she had a large bowel movement that was hard, no diarrhea. No blood in her stool. Fever, chills, and states that her peritoneal dialysis has been clear without cloudiness and without redness around her peritoneal dialysis site. She only notes pain in her epigastrium. She denies any cough or cold symptoms, she is vaccinated against COVID-19. No chest pain, shortness of breath, nausea or any other medical concerns. She has been taking Zofran intermittently for her nausea and she states that this usually helps her symptoms last dose of which was last night. Past Medical History:   Diagnosis Date     Sleep Apnea 2/16/2011    Compliant with c-pap.     Aortic aneurysm (HCC)     Abdominal    CAD (coronary Artery Disease) Native Artery 2/16/2011    Chronic kidney disease     Hemodiaylsis  3x/week    Chronic pain     CKD (chronic kidney disease) stage 4, GFR 15-29 ml/min ([facility] 2/10/2017    Diabetic gastroparesis (HCC)     Diastolic heart failure ([address] 10/5/2012    DM type 2 causing neurological disease [facility]     DM type 2 causing renal disease (HCC)     Insulin SS at night only PRN    DM type 2 causing vascular disease [facility])     Esophageal stricture 2012    dialted Dr. Fay Juarez G6PD deficiency     trait    GERD (gastroesophageal reflux disease)     Gout     Heart failure [facility]     Hemodialysis access, AV graft ([facility] 04/02/2017    Dialysis MWF    [facility] [address]  Hypertension     ILD (interstitial lung disease) ([facility]     open lung bx CJW 2010    Infected dental caries 3/17/2020    Loss of appetite 3/17/2020    Morbid obesity (HCC)     OA (osteoarthritis)     Ovarian cancer (HCC)     cervical and uterine    Rheumatoid arteritis (HCC)     S/P left pulmonary artery pressure sensor implant placement 8/31/2017    [facility]     Thromboembolus ([facility]     Right calf     Tobacco use disorder 3/21/2012    Uterine cervix cancer ([facility]     Vitamin D deficiency 8/9/2013       Past Surgical History:   Procedure Laterality Date    COLONOSCOPY N/A 6/24/2016    COLONOSCOPY performed by Edgard Leary MD [facility] HX ADENOIDECTOMY      HX APPENDECTOMY      HX CARPAL TUNNEL RELEASE      bilateral    HX CHOLECYSTECTOMY      HX HEART CATHETERIZATION      x 7    HX HERNIA REPAIR      HX HYSTERECTOMY      HX ORTHOPAEDIC Right 11/12/12    right knee replacement    HX ORTHOPAEDIC Bilateral     carpal tunnel repair    HX ORTHOPAEDIC Right     PLATE IN ANKLE DUE TO FRACTURE    HX SALPINGO-OOPHORECTOMY      HX TONSIL AND ADENOIDECTOMY      HX TONSILLECTOMY      HX VASCULAR ACCESS Left     Left lower arm AV fistula    AK CARDIAC SURG PROCEDURE UNLIST  02/2019    x4 with last sttent placed 2/2019.     [place] CHEST SURGERY PROCEDURE UNLISTED Right     > 20 years ago  RLL removed     UPPER GI ENDOSCOPY,VINOD W GUIDE  6/24/2016              Family History:   Problem Relation Age of Onset    Heart Disease Mother     No Known Problems Daughter     No Known Problems Son     No Known Problems Son     Anesth Problems Neg Hx        Social History     Socioeconomic History    Marital status:      Spouse name: Not on file    Number of children: Not on file    Years of education: Not on file    Highest education level: Not on file   Occupational History    Not on file   Tobacco Use    Smoking status: Former Smoker     Packs/day: 0.50     Years: 41.00     Pack years: 20.50     Types: Cigarettes     Quit date: 2014     Years since quittin.5    Smokeless tobacco: Never Used   Vaping Use    Vaping Use: Never used   Substance and Sexual Activity    Alcohol use: No    Drug use: No    Sexual activity: Not on file   Other Topics Concern    Not on file   Social History Narrative    Not on file     Social Determinants of Health     Financial Resource Strain: Low Risk     Difficulty of Paying Living Expenses: Not hard at all   Food Insecurity: No Food Insecurity    Worried About [address] [employer] in the Last Year: Never true    [facility] [employer] in the Last Year: Never true   Transportation Needs:     Lack of Transportation (Medical): Not on file    Lack of Transportation (Non-Medical):  Not on file   Physical Activity:     Days of Exercise per Week: Not on file    Minutes of Exercise per Session: Not on file   Stress:     Feeling of Stress : Not on file   Social Connections:     Frequency of Communication with Friends and Family: Not on file    Frequency of Social Gatherings with Friends and Family: Not on file    Attends Mandaeism Services: Not on file    Active Member of [address] [place] [address] or Organizations: Not on file    Attends Club or Organization Meetings: Not on file    Marital Status: Not on file   Intimate Partner Violence:     Fear of Current or Ex-Partner: Not on file    Emotionally Abused: Not on file    Physically Abused: Not on file    Sexually Abused: Not on file   Housing Stability:     Unable to Pay for Housing in the Last Year: Not on file    Number of [place] in the Last Year: Not on file    Unstable Housing in the Last Year: Not on file         ALLERGIES: Contrast dye [iodine], Shellfish derived, Levaquin [levofloxacin], Morphine, and Nsaids (non-steroidal anti-inflammatory drug)    Review of Systems   Constitutional: Positive for appetite change. Negative for activity change, chills and fever. HENT: Negative for congestion, rhinorrhea, sinus pressure, sneezing and sore throat. Eyes: Negative for photophobia and visual disturbance. Respiratory: Negative for cough and shortness of breath. Cardiovascular: Negative for chest pain. Gastrointestinal: Positive for abdominal pain, constipation, nausea and vomiting. Negative for blood in stool and diarrhea. Genitourinary: Negative for difficulty urinating, dysuria, flank pain, frequency, hematuria, menstrual problem, urgency, vaginal bleeding and vaginal discharge. Musculoskeletal: Negative for arthralgias, back pain, myalgias and neck pain. Skin: Negative for rash and wound. Neurological: Negative for syncope, weakness, numbness and headaches. Psychiatric/Behavioral: Negative for self-injury and suicidal ideas. All other systems reviewed and are negative. Vitals:    05/28/22 0754   BP: (!) 132/58   Pulse: 75   Resp: 18   Temp: 98.6 °F (37 °C)   SpO2: 100%   Height: 5' 10\" (1.778 m)            Physical Exam  Vitals and nursing note reviewed. Constitutional:       General: She is not in acute distress. Appearance: Normal appearance. She is well-developed. She is obese. She is not diaphoretic. Comments: Pleasant, no acute distress. HENT:      Head: Normocephalic and atraumatic. Nose: Nose normal.   Eyes:      Extraocular Movements: Extraocular movements intact. Conjunctiva/sclera: Conjunctivae normal.      Pupils: Pupils are equal, round, and reactive to light. Cardiovascular:      Rate and Rhythm: Normal rate and regular rhythm. Heart sounds: Normal heart sounds. Pulmonary:      Effort: Pulmonary effort is normal.      Breath sounds: Normal breath sounds.    Abdominal: General: There is no distension. Palpations: Abdomen is soft. Tenderness: There is abdominal tenderness in the epigastric area. There is no right CVA tenderness, left CVA tenderness, guarding or rebound. Musculoskeletal:         General: No tenderness. Cervical back: Neck supple. Skin:     General: Skin is warm and dry. Neurological:      General: No focal deficit present. Mental Status: She is alert and oriented to person, place, and time. Cranial Nerves: No cranial nerve deficit. Sensory: No sensory deficit. Motor: No weakness. Coordination: Coordination normal.          MDM   79-year-old female with a history of gastroparesis presents with nausea and vomiting and epigastric pain. Is afebrile vital signs stable no acute distress here. Labs returned showing no leukocytosis, Hg 10.5, creatinine 16.16, BUN 57, known ESRD on PD, no metabolic acidosis with bicarb 22, unremarkable LFTs. Normal lipase. CT abdomen pelvis shows no acute abnormalities. She was treated symptomatically in the ED and was found to have significant improvement in her symptoms. Feel that her symptoms are likely secondary to gastritis and gastroparesis. She was recommended very bland diet avoiding spicy or acidic foods that would irritate her symptoms further as well as well Rx refill for Zofran. She was recommended follow-up with her gastroenterologist and return precautions were given for worsening or concerns. This plan was discussed with the patient and her  at the bedside and they stated both understanding and agreement. Procedures    753 EKG shows normal sinus rhythm with AV block and a rate of 78 bpm no acute ST or T wave abnormalities suggestive of ischemia. Please note that this dictation was completed with [employer], the [employer] voice recognition software.   Quite often unanticipated grammatical, syntax, homophones, and other interpretive errors are inadvertently transcribed by the computer software. Please disregard these errors. Please excuse any errors that have escaped final proofreading.

## 2022-05-29 LAB
ATRIAL RATE: 78 BPM
CALCULATED P AXIS, ECG09: 35 DEGREES
CALCULATED R AXIS, ECG10: 5 DEGREES
CALCULATED T AXIS, ECG11: 60 DEGREES
DIAGNOSIS, 93000: NORMAL
P-R INTERVAL, ECG05: 214 MS
Q-T INTERVAL, ECG07: 390 MS
QRS DURATION, ECG06: 86 MS
QTC CALCULATION (BEZET), ECG08: 444 MS
VENTRICULAR RATE, ECG03: 78 BPM

## 2022-06-01 ENCOUNTER — OFFICE VISIT (OUTPATIENT)
Dept: FAMILY MEDICINE CLINIC | Age: 67
End: 2022-06-01
Payer: MEDICARE

## 2022-06-01 VITALS
HEART RATE: 60 BPM | BODY MASS INDEX: 40.09 KG/M2 | SYSTOLIC BLOOD PRESSURE: 134 MMHG | TEMPERATURE: 97.8 F | WEIGHT: 280 LBS | HEIGHT: 70 IN | OXYGEN SATURATION: 93 % | DIASTOLIC BLOOD PRESSURE: 74 MMHG

## 2022-06-01 DIAGNOSIS — I26.99 OTHER PULMONARY EMBOLISM WITHOUT ACUTE COR PULMONALE, UNSPECIFIED CHRONICITY (HCC): ICD-10-CM

## 2022-06-01 DIAGNOSIS — R10.84 GENERALIZED ABDOMINAL PAIN: Primary | ICD-10-CM

## 2022-06-01 PROCEDURE — G0463 HOSPITAL OUTPT CLINIC VISIT: HCPCS | Performed by: STUDENT IN AN ORGANIZED HEALTH CARE EDUCATION/TRAINING PROGRAM

## 2022-06-01 PROCEDURE — G8417 CALC BMI ABV UP PARAM F/U: HCPCS | Performed by: STUDENT IN AN ORGANIZED HEALTH CARE EDUCATION/TRAINING PROGRAM

## 2022-06-01 PROCEDURE — G8427 DOCREV CUR MEDS BY ELIG CLIN: HCPCS | Performed by: STUDENT IN AN ORGANIZED HEALTH CARE EDUCATION/TRAINING PROGRAM

## 2022-06-01 PROCEDURE — 99214 OFFICE O/P EST MOD 30 MIN: CPT | Performed by: STUDENT IN AN ORGANIZED HEALTH CARE EDUCATION/TRAINING PROGRAM

## 2022-06-01 PROCEDURE — G8510 SCR DEP NEG, NO PLAN REQD: HCPCS | Performed by: STUDENT IN AN ORGANIZED HEALTH CARE EDUCATION/TRAINING PROGRAM

## 2022-06-01 PROCEDURE — G9231 DOC ESRD DIA TRANS PREG: HCPCS | Performed by: STUDENT IN AN ORGANIZED HEALTH CARE EDUCATION/TRAINING PROGRAM

## 2022-06-01 PROCEDURE — 3017F COLORECTAL CA SCREEN DOC REV: CPT | Performed by: STUDENT IN AN ORGANIZED HEALTH CARE EDUCATION/TRAINING PROGRAM

## 2022-06-01 RX ORDER — WARFARIN 3 MG/1
TABLET ORAL
Qty: 80 TABLET | Refills: 4 | Status: SHIPPED | OUTPATIENT
Start: 2022-06-01 | End: 2022-06-08

## 2022-06-01 RX ORDER — DICYCLOMINE HYDROCHLORIDE 10 MG/1
10 CAPSULE ORAL
Qty: 15 CAPSULE | Refills: 0 | Status: ON HOLD | OUTPATIENT
Start: 2022-06-01 | End: 2022-07-24

## 2022-06-01 NOTE — PROGRESS NOTES
Cece Martinez is a 61 y.o. female , id x 2(name and ). Reviewed record, history, and  medications. Chief Complaint   Patient presents with    Abdominal Pain     states that she has upper and lower quadrant pain feels like her bladder is full    [facility] Follow Up     dx with gastroparesis, gastritis and duodenitis     Vomiting     bile       Vitals:    22 1509   BP: 134/74   Pulse: 60   Temp: 97.8 °F (36.6 °C)   SpO2: 93%   Weight: 280 lb (127 kg)   Height: 5' 10\" (1.778 m)       Coordination of Care Questionnaire:   1) Have you been to an emergency room, urgent care, or hospitalized since your last visit?   no       2. Have seen or consulted any other health care provider since your last visit? NO      3 most recent PHQ Screens 2022   Little interest or pleasure in doing things Not at all   Feeling down, depressed, irritable, or hopeless Not at all   Total Score PHQ 2 0       Patient is accompanied by self I have received verbal consent from Cece Martinez to discuss any/all medical information while they are present in the room. on insulin. Unfortunately she cannot exercise much due to bad back and bad knees. Patient is also on home oxygen. 2. Essential hypertension: Uncontrolled but improved in clinic. She is clinically stable. To be followed by PCP. 3. Pure hypercholesterolemia: clinically doing well on statins with no evidence of liver or muscle damage. Goals of therapy were discussed with patient in detail. 4. Acquired hypothyroidism: Current Synthroid dose is well tolerated and effective. We are going to monitor thyroid levels periodically. 5. Albuminuria : Patient is on angiotensin receptor blockade. She follows with nephrology. Orders Placed This Encounter   Procedures    Hemoglobin A1C     Standing Status:   Future     Standing Expiration Date:   11/8/2018       · The risks and benefits of my recommendations, as well as other treatment options were discussed with the patient today. Questions were answered. · Follow up: 3 months and as needed. Electronically signed by Escobar Perry MD on 11/8/17 at 5:25 PM    **This report has been created using voice recognition software. It may   contain minor errors which are inherent in voice recognition technology. **

## 2022-06-01 NOTE — PATIENT INSTRUCTIONS
I recommend that we start medications to help clear your bowels. I hope that clearing your bowels will help improve your abdominal pain, I expect it will. First goal is to make sure that all stools are soft. This can be accomplished with prunes/juice, fiber supplements or bulking laxatives such as MiraLAX and Metamucil. You need to make sure you drink plenty of water in order for these to work. You can increase these as needed to achieve soft stools, back off for diarrhea. Start taking over-the-counter Colace daily. This will help to moisten and lubricate the stools. Once stools are soft consistency, I recommend taking senna for 1-5 days to help push the bowels along. After you feel that your bowels have cleared, this can be taken as needed to help. I would recommend 1-4 times per week. Other options to help include docusate suppository or a Fleets enema. These will typically work faster if you are wishing to do more in addition to the above recommendations. Please let me know if you have any questions. Please keep me updated on your progress. We can always do another x-ray to reevaluate your constipation. Expect it will take 1-2 weeks for you to fully clear out. After clear out, it is a good idea to continue the fiber and Colace to help maintain your bowels. Abdominal Pain: Care Instructions  Your Care Instructions     Abdominal pain has many possible causes. Some aren't serious and get better on their own in a few days. Others need more testing and treatment. If your pain continues or gets worse, you need to be rechecked and may need more tests to find out what is wrong. You may need surgery to correct the problem. Don't ignore new symptoms, such as fever, nausea and vomiting, urination problems, pain that gets worse, and dizziness. These may be signs of a more serious problem. Your doctor may have recommended a follow-up visit in the next 8 to 12 hours.  If you are not getting better, you may need more tests or treatment. The doctor has checked you carefully, but problems can develop later. If you notice any problems or new symptoms, get medical treatment right away. Follow-up care is a key part of your treatment and safety. Be sure to make and go to all appointments, and call your doctor if you are having problems. It's also a good idea to know your test results and keep a list of the medicines you take. How can you care for yourself at home? · Rest until you feel better. · To prevent dehydration, drink plenty of fluids. Choose water and other clear liquids until you feel better. If you have kidney, heart, or liver disease and have to limit fluids, talk with your doctor before you increase the amount of fluids you drink. · If your stomach is upset, eat mild foods, such as rice, dry toast or crackers, bananas, and applesauce. Try eating several small meals instead of two or three large ones. · Wait until 48 hours after all symptoms have gone away before you have spicy foods, alcohol, and drinks that contain caffeine. · Do not eat foods that are high in fat. · Avoid anti-inflammatory medicines such as aspirin, ibuprofen (Advil, Motrin), and naproxen (Aleve). These can cause stomach upset. Talk to your doctor if you take daily aspirin for another health problem. When should you call for help? Call 911 anytime you think you may need emergency care. For example, call if:    · You passed out (lost consciousness).     · You pass maroon or very bloody stools.     · You vomit blood or what looks like coffee grounds.     · You have new, severe belly pain.    Call your doctor now or seek immediate medical care if:    · Your pain gets worse, especially if it becomes focused in one area of your belly.     · You have a new or higher fever.     · Your stools are black and look like tar, or they have streaks of blood.     · You have unexpected vaginal bleeding.     · You have symptoms of a urinary tract infection. These may include:  ? Pain when you urinate. ? Urinating more often than usual.  ? Blood in your urine.     · You are dizzy or lightheaded, or you feel like you may faint. Watch closely for changes in your health, and be sure to contact your doctor if:    · You are not getting better after 1 day (24 hours). Where can you learn more? Go to http://www.gray.com/  Enter M047 in the search box to learn more about \"Abdominal Pain: Care Instructions. \"  Current as of: July 1, 2021               Content Version: 13.2  © 2775-4934 [employer]. Care instructions adapted under license by [employer] (which disclaims liability or warranty for this information). If you have questions about a medical condition or this instruction, always ask your healthcare professional. [address] any warranty or liability for your use of this information.

## 2022-06-01 NOTE — PROGRESS NOTES
Progress Note    she is a 61y.o. year old female who presents for evalution. Assessment/ Plan:   Diagnoses and all orders for this visit:    1. Generalized abdominal pain  Exam not c/w acute abdomen. Suspect constipation is a contributor to current flare of abdominal pain and gastroparesis. Unable to tolerate Reglan due to tardive dyskinesia. Continue Zofran. X-ray to assess stool burden, bowel clear out recommendations provided. Trial Bentyl for abdominal pain, reviewed lack of studies in patients with CKD/dialysis. Recommended max 1 dose daily. Encouraged to see gastroenterology as soon as possible, possibly establish with new provider  Reviewed s/s to prompt pt to seek out emergent evaluation and treatment.   -     REFERRAL TO GASTROENTEROLOGY  -     dicyclomine (BENTYL) 10 mg capsule; Take 1 Capsule by mouth daily as needed for Abdominal Cramps, Cramping or Pain.  -     XR ABD (KUB); Future    2. Other pulmonary embolism without acute cor pulmonale, unspecified chronicity (HCC)  -     warfarin (COUMADIN) 3 mg tablet; 1 tab PO qday x 3 days a week      Follow-up and Dispositions    · Return for follow-up as previously scheduled. I have discussed the diagnosis with the patient and the intended plan as seen in the above orders. The patient has received an after-visit summary and questions were answered concerning future plans. Pt conveyed understanding of plan. Medication Side Effects and Warnings were discussed with patient        Subjective:     Chief Complaint   Patient presents with    Abdominal Pain     states that she has upper and lower quadrant pain feels like her bladder is full    [facility] Follow Up     dx with gastroparesis, gastritis and duodenitis     Vomiting     bile     Last BM 2 days ago. Taking senna, dulcolax, and something else.    Stools are runny   No sign of blood  Has avoided pain pills for the last few weeks due to constipation and spitting up bile for 1.5 weeks. Pain in the front and back, all over abdomen. ER visit 5/28. GI follow-up is in August.  No fevers or chills. No urine passage  Recently started peritoneal dialysis. Reviewed PmHx, RxHx, FmHx, SocHx, AllgHx and updated and dated in the chart. Review of Systems - negative except as listed above in the HPI    Objective:     Vitals:    06/01/22 1509   BP: 134/74   Pulse: 60   Temp: 97.8 °F (36.6 °C)   SpO2: 93%   Weight: 280 lb (127 kg)   Height: 5' 10\" (1.778 m)       Current Outpatient Medications   Medication Sig    dicyclomine (BENTYL) 10 mg capsule Take 1 Capsule by mouth daily as needed for Abdominal Cramps, Cramping or Pain.  warfarin (COUMADIN) 3 mg tablet 1 tab PO qday x 3 days a week    ondansetron (ZOFRAN ODT) 4 mg disintegrating tablet Take 1 Tablet by mouth every eight (8) hours as needed for Nausea or Vomiting.  warfarin (COUMADIN) 2.5 mg tablet TAKE 1 TABLET BY MOUTH EVERY OTHER DAY    isosorbide mononitrate ER (IMDUR) 120 mg CR tablet Take 0.5 Tablets by mouth every morning.  atorvastatin (LIPITOR) 80 mg tablet TAKE 1 TABLET BY MOUTH ONCE DAILY NIGHTLY    allopurinoL (ZYLOPRIM) 100 mg tablet Take 1 tablet by mouth once daily    pantoprazole (Protonix) 40 mg tablet Take 1 Tablet by mouth daily. (Patient taking differently: Take 40 mg by mouth two (2) times a day.)    CALCITRIOL PO Take 0.5 mg by mouth.  sucralfate (CARAFATE) 1 gram tablet Take 1 tablet by mouth 4 times daily    carvediloL (COREG) 25 mg tablet TAKE 1 TABLET BY MOUTH TWICE DAILY WITH MEALS    docusate sodium (STOOL SOFTENER PO) Take  by mouth daily.  nitroglycerin (NITROSTAT) 0.4 mg SL tablet 1 Tab by SubLINGual route every five (5) minutes as needed for Chest Pain. Up to 3 doses.  albuterol (PROAIR HFA) 90 mcg/actuation inhaler Take 2 Puffs by inhalation every four (4) hours as needed for Wheezing.     Oxygen O2 2L NC 24/7    [START ON 7/6/2022] oxyCODONE-acetaminophen (PERCOCET 10)  mg per tablet Take 1 Tablet by mouth every eight (8) hours as needed for Pain for up to 30 days. Max Daily Amount: 3 Tablets. (Patient not taking: Reported on 6/1/2022)    lubiPROStone (Amitiza) 24 mcg capsule Take 24 mcg by mouth two (2) times daily (with meals). (Patient not taking: Reported on 6/1/2022)     No current facility-administered medications for this visit. Physical Exam  Vitals and nursing note reviewed. Constitutional:       General: She is not in acute distress. Appearance: Normal appearance. She is not ill-appearing, toxic-appearing or diaphoretic. Comments: She appears uncomfortable, seated in motorized scooter   HENT:      Head: Normocephalic and atraumatic. Eyes:      General: No scleral icterus. Right eye: No discharge. Left eye: No discharge. Conjunctiva/sclera: Conjunctivae normal.   Cardiovascular:      Rate and Rhythm: Normal rate and regular rhythm. Pulses: Normal pulses. Heart sounds: Normal heart sounds. Pulmonary:      Effort: Pulmonary effort is normal. No respiratory distress. Breath sounds: Normal breath sounds. Abdominal:      General: Bowel sounds are decreased. There is distension (Mild  ). Palpations: Abdomen is soft. There is no mass. Tenderness: There is abdominal tenderness (Right lower quadrant near peritoneal dialysis catheter). There is no right CVA tenderness, left CVA tenderness, guarding or rebound. Musculoskeletal:         General: No tenderness. Cervical back: No rigidity. Right lower leg: No edema. Left lower leg: No edema. Skin:     General: Skin is warm and dry. Neurological:      General: No focal deficit present. Mental Status: She is alert. Psychiatric:         Mood and Affect: Mood normal.         Behavior: Behavior normal.         Thought Content:  Thought content normal.         Judgment: Judgment normal.              Lorenzo Wang MD

## 2022-06-02 ENCOUNTER — HOSPITAL ENCOUNTER (OUTPATIENT)
Dept: GENERAL RADIOLOGY | Age: 67
Discharge: HOME | End: 2022-06-02
Attending: STUDENT IN AN ORGANIZED HEALTH CARE EDUCATION/TRAINING PROGRAM
Payer: MEDICARE

## 2022-06-02 DIAGNOSIS — R10.84 GENERALIZED ABDOMINAL PAIN: ICD-10-CM

## 2022-06-02 PROCEDURE — 74018 RADEX ABDOMEN 1 VIEW: CPT

## 2022-06-02 NOTE — LETTER
6/16/2022    Ms. Arianna Esparza  [address]      Dear Arianna Esparza:    Please find your most recent results below. Resulted Orders   XR ABD (KUB)    Narrative    EXAM: XR ABD (KUB)    INDICATION: Generalized abdominal pain    COMPARISON: CT 5/28/2022. FINDINGS: 2 supine radiographs of the abdomen show a peritoneal dialysis  catheter in the right lower quadrant and pelvis. There is no bowel dilation. The  lung bases appear normal several calcifications are again shown overlying the  renal shadows bilaterally consistent with calculi. Abundant atherosclerotic  calcifications are again noted. The bones are mildly osteopenic. Impression    No evidence for bowel obstruction. RECOMMENDATIONS:  Your abdominal xray shows kidney stones and mild osteopenia (decreased bone density.  If you haven't had a previous DEXA scan to check your bone density, I'd recommend that we check that.     You do not have a bowel obstruction which is good. There is a moderate buildup of stool in your colon.  Constipation may be a contributor to persistent abdominal pain.  Recommend bowel regimen as previously discussed.     Please call me if you have any questions: [phone]    Sincerely,      Jeancarlos Abler

## 2022-06-05 NOTE — PROGRESS NOTES
XR demonstrates renal calculi BL, mild osteopenia. No bowel obstruction. Upon review of images, moderate amount of stool in descending colon. Constipation may be a contributor to persistent abdominal pain. Recommend bowel regimen as previously discussed.

## 2022-06-08 ENCOUNTER — OFFICE VISIT (OUTPATIENT)
Dept: FAMILY MEDICINE CLINIC | Age: 67
End: 2022-06-08
Payer: MEDICARE

## 2022-06-08 VITALS
TEMPERATURE: 98.3 F | BODY MASS INDEX: 39.51 KG/M2 | OXYGEN SATURATION: 96 % | HEART RATE: 69 BPM | RESPIRATION RATE: 16 BRPM | HEIGHT: 70 IN | DIASTOLIC BLOOD PRESSURE: 78 MMHG | SYSTOLIC BLOOD PRESSURE: 146 MMHG | WEIGHT: 276 LBS

## 2022-06-08 DIAGNOSIS — T40.2X5A CONSTIPATION DUE TO OPIOID THERAPY: ICD-10-CM

## 2022-06-08 DIAGNOSIS — I26.99 OTHER PULMONARY EMBOLISM WITHOUT ACUTE COR PULMONALE, UNSPECIFIED CHRONICITY (HCC): ICD-10-CM

## 2022-06-08 DIAGNOSIS — Z51.81 ENCOUNTER FOR MONITORING COUMADIN THERAPY: Primary | ICD-10-CM

## 2022-06-08 DIAGNOSIS — Z79.01 ENCOUNTER FOR MONITORING COUMADIN THERAPY: Primary | ICD-10-CM

## 2022-06-08 DIAGNOSIS — L98.491 SKIN ULCER, LIMITED TO BREAKDOWN OF SKIN (HCC): ICD-10-CM

## 2022-06-08 DIAGNOSIS — K59.03 CONSTIPATION DUE TO OPIOID THERAPY: ICD-10-CM

## 2022-06-08 LAB
INR BLD: 7.7
PT POC: 91.8 SECONDS
VALID INTERNAL CONTROL?: YES

## 2022-06-08 PROCEDURE — G8417 CALC BMI ABV UP PARAM F/U: HCPCS | Performed by: FAMILY MEDICINE

## 2022-06-08 PROCEDURE — G0463 HOSPITAL OUTPT CLINIC VISIT: HCPCS | Performed by: FAMILY MEDICINE

## 2022-06-08 PROCEDURE — G8432 DEP SCR NOT DOC, RNG: HCPCS | Performed by: FAMILY MEDICINE

## 2022-06-08 PROCEDURE — 3017F COLORECTAL CA SCREEN DOC REV: CPT | Performed by: FAMILY MEDICINE

## 2022-06-08 PROCEDURE — 85610 PROTHROMBIN TIME: CPT | Performed by: FAMILY MEDICINE

## 2022-06-08 PROCEDURE — 99214 OFFICE O/P EST MOD 30 MIN: CPT | Performed by: FAMILY MEDICINE

## 2022-06-08 PROCEDURE — G9231 DOC ESRD DIA TRANS PREG: HCPCS | Performed by: FAMILY MEDICINE

## 2022-06-08 PROCEDURE — G8427 DOCREV CUR MEDS BY ELIG CLIN: HCPCS | Performed by: FAMILY MEDICINE

## 2022-06-08 RX ORDER — WARFARIN 2.5 MG/1
TABLET ORAL
Qty: 90 TABLET | Refills: 3 | Status: ON HOLD | OUTPATIENT
Start: 2022-06-08 | End: 2022-07-24

## 2022-06-08 NOTE — PATIENT INSTRUCTIONS
A Healthy Lifestyle: Care Instructions  Your Care Instructions     A healthy lifestyle can help you feel good, stay at a healthy weight, and have plenty of energy for both work and play. A healthy lifestyle is something you can share with your whole family. A healthy lifestyle also can lower your risk for serious health problems, such as high blood pressure, heart disease, and diabetes. You can follow a few steps listed below to improve your health and the health of your family. Follow-up care is a key part of your treatment and safety. Be sure to make and go to all appointments, and call your doctor if you are having problems. It's also a good idea to know your test results and keep a list of the medicines you take. How can you care for yourself at home? · Do not eat too much sugar, fat, or fast foods. You can still have dessert and treats now and then. The goal is moderation. · Start small to improve your eating habits. Pay attention to portion sizes, drink less juice and soda pop, and eat more fruits and vegetables. ? Eat a healthy amount of food. A 3-ounce serving of meat, for example, is about the size of a deck of cards. Fill the rest of your plate with vegetables and whole grains. ? Limit the amount of soda and sports drinks you have every day. Drink more water when you are thirsty. ? Eat plenty of fruits and vegetables every day. Have an apple or some carrot sticks as an afternoon snack instead of a candy bar. Try to have fruits and/or vegetables at every meal.  · Make exercise part of your daily routine. You may want to start with simple activities, such as walking, bicycling, or slow swimming. Try to be active 30 to 60 minutes every day. You do not need to do all 30 to 60 minutes all at once. For example, you can exercise 3 times a day for 10 or 20 minutes.  Moderate exercise is safe for most people, but it is always a good idea to talk to your doctor before starting an exercise program.  · Keep moving. [place] the lawn, work in the garden, or [employer]. Take the stairs instead of the elevator at work. · If you smoke, quit. People who smoke have an increased risk for heart attack, stroke, cancer, and other lung illnesses. Quitting is hard, but there are ways to boost your chance of quitting tobacco for good. ? Use nicotine gum, patches, or lozenges. ? Ask your doctor about stop-smoking programs and medicines. ? Keep trying. In addition to reducing your risk of diseases in the future, you will notice some benefits soon after you stop using tobacco. If you have shortness of breath or asthma symptoms, they will likely get better within a few weeks after you quit. · Limit how much alcohol you drink. Moderate amounts of alcohol (up to 2 drinks a day for men, 1 drink a day for women) are okay. But drinking too much can lead to liver problems, high blood pressure, and other health problems. Family health  If you have a family, there are many things you can do together to improve your health. · Eat meals together as a family as often as possible. · Eat healthy foods. This includes fruits, vegetables, lean meats and dairy, and whole grains. · Include your family in your fitness plan. Most people think of activities such as jogging or tennis as the way to fitness, but there are many ways you and your family can be more active. Anything that makes you breathe hard and gets your heart pumping is exercise. Here are some tips:  ? Walk to do errands or to take your child to school or the bus.  ? Go for a family bike ride after dinner instead of watching TV. Where can you learn more? Go to http://www.gray.com/  Enter F553 in the search box to learn more about \"A Healthy Lifestyle: Care Instructions. \"  Current as of: June 16, 2021               Content Version: 13.2  © 0572-6192 [employer].    Care instructions adapted under license by [employer] (which disclaims liability or warranty for this information). If you have questions about a medical condition or this instruction, always ask your healthcare professional. [address] any warranty or liability for your use of this information.

## 2022-06-08 NOTE — PROGRESS NOTES
Chief Complaint   Patient presents with    Follow-up     INR     Patient presents in office today for INR check. States that she has sores on her behind. She would like to discuss x-ray that Dr. Jacqueline Gandhi had ordered. No other concerns. 1. Have you been to the ER, urgent care clinic since your last visit? Hospitalized since your last visit? No    2. Have you seen or consulted any other health care providers outside of the [address] [place] [address] since your last visit? Include any pap smears or colon screening.  No    Learning Assessment 6/28/2019   PRIMARY LEARNER Patient   PRIMARY LANGUAGE ENGLISH   LEARNER PREFERENCE PRIMARY LISTENING   ANSWERED BY patient    RELATIONSHIP SELF

## 2022-06-08 NOTE — PROGRESS NOTES
Progress Note    she is a 61y.o. year old female who presents for evalution. Subjective:     Pt states she sores on her buttocks that are now scabs and hurt. Has been using Bactroban ointment and not helping. Were bleeding at one point and now scabbed up and not going away. Been like this for past couple weeks. INR level is quite high today at 7.7. She did have a bone marrow biopsy yesterday and reports no issue with bleeding during procedure. Not much of an appetite so eating less. Pt with abd discomfort had xray showed constipation and renal stones but these stones are not new. She does can't afford to take linzess. Reviewed PmHx, RxHx, FmHx, SocHx, AllgHx and updated and dated in the chart. Review of Systems - negative except as listed above in the HPI    Objective:     Vitals:    06/08/22 1436   BP: (!) 146/78   Pulse: 69   Resp: 16   Temp: 98.3 °F (36.8 °C)   TempSrc: Oral   SpO2: 96%   Weight: 276 lb (125.2 kg)   Height: 5' 10\" (1.778 m)       Current Outpatient Medications   Medication Sig    warfarin (COUMADIN) 2.5 mg tablet TAKE 1 TABLET BY MOUTH EVERY DAY    naloxegoL (Movantik) 25 mg tab tablet Take 1 Tablet by mouth daily.  dicyclomine (BENTYL) 10 mg capsule Take 1 Capsule by mouth daily as needed for Abdominal Cramps, Cramping or Pain.  ondansetron (ZOFRAN ODT) 4 mg disintegrating tablet Take 1 Tablet by mouth every eight (8) hours as needed for Nausea or Vomiting.  isosorbide mononitrate ER (IMDUR) 120 mg CR tablet Take 0.5 Tablets by mouth every morning.  atorvastatin (LIPITOR) 80 mg tablet TAKE 1 TABLET BY MOUTH ONCE DAILY NIGHTLY    allopurinoL (ZYLOPRIM) 100 mg tablet Take 1 tablet by mouth once daily    pantoprazole (Protonix) 40 mg tablet Take 1 Tablet by mouth daily. (Patient taking differently: Take 40 mg by mouth two (2) times a day.)    CALCITRIOL PO Take 0.5 mg by mouth.     sucralfate (CARAFATE) 1 gram tablet Take 1 tablet by mouth 4 times daily    carvediloL (COREG) 25 mg tablet TAKE 1 TABLET BY MOUTH TWICE DAILY WITH MEALS    docusate sodium (STOOL SOFTENER PO) Take  by mouth daily.  nitroglycerin (NITROSTAT) 0.4 mg SL tablet 1 Tab by SubLINGual route every five (5) minutes as needed for Chest Pain. Up to 3 doses.  albuterol (PROAIR HFA) 90 mcg/actuation inhaler Take 2 Puffs by inhalation every four (4) hours as needed for Wheezing.  Oxygen O2 2L NC 24/7    [START ON 7/6/2022] oxyCODONE-acetaminophen (PERCOCET 10)  mg per tablet Take 1 Tablet by mouth every eight (8) hours as needed for Pain for up to 30 days. Max Daily Amount: 3 Tablets. (Patient not taking: Reported on 6/1/2022)    lubiPROStone (Amitiza) 24 mcg capsule Take 24 mcg by mouth two (2) times daily (with meals). (Patient not taking: Reported on 6/1/2022)     No current facility-administered medications for this visit. Physical Examination: General appearance - alert, well appearing, and in no distress    Skin 2 small ulcers buttocks gluteal cleft limited to skin breakdown no sign of infection. Assessment/ Plan:   Diagnoses and all orders for this visit:    1. Encounter for monitoring Coumadin therapy  -     AMB POC PT/INR    2. Skin ulcer, limited to breakdown of skin ([address]  HBactroban and desitin and can use tegaderm to protect  3. Other pulmonary embolism without acute cor pulmonale, unspecified chronicity (HCC)  -     warfarin (COUMADIN) 2.5 mg tablet; TAKE 1 TABLET BY MOUTH EVERY DAY  Hold for 3 days then restart 2.5 daily. 4. Constipation due to opioid therapy  -     naloxegoL (Movantik) 25 mg tab tablet; Take 1 Tablet by mouth daily. Follow-up and Dispositions    · Return in about 1 week (around 6/15/2022), or if symptoms worsen or fail to improve. I have discussed the diagnosis with the patient and the intended plan as seen in the above orders.   The patient has received an after-visit summary and questions were answered concerning future plans. Pt conveyed understanding of plan.     Medication Side Effects and Warnings were discussed with patient    An electronic signature was used to authenticate this note  Kianna Leyva DO

## 2022-06-24 ENCOUNTER — TELEPHONE (OUTPATIENT)
Dept: FAMILY MEDICINE CLINIC | Age: 67
End: 2022-06-24

## 2022-06-24 NOTE — TELEPHONE ENCOUNTER
Pt  [phone] was in the hosp at [facility] and her INR is very low needs to make an appt with in 2 to 3 days please advise

## 2022-06-29 NOTE — TELEPHONE ENCOUNTER
Pt reports that she was admitted at [facility] x 1 week for \"dangerously low\" potassium levels. At time of discharge pt states she had ~ 30lbs of fluid retention. She spoke to her nephrologist on Sunday and she was encouraged to be further evaluated by the ED for fluid overload/ CHF. She was admitted at [facility] and reports that her INR was 1.2 at time of discharge. Pt has been scheduled for an appointment tomorrow for [place] and INR check. Nurse requested she bring any papers from [facility] and/or [facility] for provider review, but she advised she has already disposed of it.      Future Appointments   Date Time Provider Meagan Mcintyre   6/30/2022  1:30 PM Vishnu Blake NP [facility] AMB

## 2022-06-30 ENCOUNTER — OFFICE VISIT (OUTPATIENT)
Dept: FAMILY MEDICINE CLINIC | Age: 67
End: 2022-06-30
Payer: MEDICARE

## 2022-06-30 VITALS
HEART RATE: 75 BPM | TEMPERATURE: 98.4 F | RESPIRATION RATE: 16 BRPM | SYSTOLIC BLOOD PRESSURE: 99 MMHG | HEIGHT: 70 IN | DIASTOLIC BLOOD PRESSURE: 63 MMHG | BODY MASS INDEX: 39.6 KG/M2 | OXYGEN SATURATION: 100 %

## 2022-06-30 DIAGNOSIS — E87.6 HYPOKALEMIA: ICD-10-CM

## 2022-06-30 DIAGNOSIS — Z99.2 ESRD ON PERITONEAL DIALYSIS (HCC): Primary | ICD-10-CM

## 2022-06-30 DIAGNOSIS — Z86.718 HX OF DEEP VENOUS THROMBOSIS: ICD-10-CM

## 2022-06-30 DIAGNOSIS — E11.43 DIABETIC GASTROPARESIS (HCC): ICD-10-CM

## 2022-06-30 DIAGNOSIS — I26.99 OTHER PULMONARY EMBOLISM WITHOUT ACUTE COR PULMONALE, UNSPECIFIED CHRONICITY (HCC): ICD-10-CM

## 2022-06-30 DIAGNOSIS — I50.32 CHRONIC HEART FAILURE WITH PRESERVED EJECTION FRACTION (HCC): ICD-10-CM

## 2022-06-30 DIAGNOSIS — Z51.81 ENCOUNTER FOR MONITORING COUMADIN THERAPY: ICD-10-CM

## 2022-06-30 DIAGNOSIS — Z79.01 ENCOUNTER FOR MONITORING COUMADIN THERAPY: ICD-10-CM

## 2022-06-30 DIAGNOSIS — Z09 HOSPITAL DISCHARGE FOLLOW-UP: ICD-10-CM

## 2022-06-30 DIAGNOSIS — N18.6 ESRD ON PERITONEAL DIALYSIS (HCC): Primary | ICD-10-CM

## 2022-06-30 DIAGNOSIS — K31.84 DIABETIC GASTROPARESIS (HCC): ICD-10-CM

## 2022-06-30 PROBLEM — T84.53XA INFECTION OF TOTAL RIGHT KNEE REPLACEMENT (HCC): Status: RESOLVED | Noted: 2020-03-17 | Resolved: 2022-06-30

## 2022-06-30 PROBLEM — R09.02 HYPOXIA: Status: RESOLVED | Noted: 2020-03-17 | Resolved: 2022-06-30

## 2022-06-30 PROBLEM — R53.83 FATIGUE: Status: RESOLVED | Noted: 2020-03-17 | Resolved: 2022-06-30

## 2022-06-30 PROBLEM — R10.9 FLANK PAIN: Status: RESOLVED | Noted: 2020-03-17 | Resolved: 2022-06-30

## 2022-06-30 PROBLEM — E87.70 HYPERVOLEMIA: Status: RESOLVED | Noted: 2020-03-17 | Resolved: 2022-06-30

## 2022-06-30 PROBLEM — M25.50 ARTHRALGIA: Status: RESOLVED | Noted: 2020-03-17 | Resolved: 2022-06-30

## 2022-06-30 PROBLEM — R31.9 HEMATURIA: Status: RESOLVED | Noted: 2020-03-17 | Resolved: 2022-06-30

## 2022-06-30 LAB
INR BLD: 4.6
PT POC: 54.6 SECONDS
VALID INTERNAL CONTROL?: YES

## 2022-06-30 PROCEDURE — G9231 DOC ESRD DIA TRANS PREG: HCPCS | Performed by: NURSE PRACTITIONER

## 2022-06-30 PROCEDURE — 2022F DILAT RTA XM EVC RTNOPTHY: CPT | Performed by: NURSE PRACTITIONER

## 2022-06-30 PROCEDURE — 3017F COLORECTAL CA SCREEN DOC REV: CPT | Performed by: NURSE PRACTITIONER

## 2022-06-30 PROCEDURE — G8432 DEP SCR NOT DOC, RNG: HCPCS | Performed by: NURSE PRACTITIONER

## 2022-06-30 PROCEDURE — 1111F DSCHRG MED/CURRENT MED MERGE: CPT | Performed by: NURSE PRACTITIONER

## 2022-06-30 PROCEDURE — G0463 HOSPITAL OUTPT CLINIC VISIT: HCPCS | Performed by: NURSE PRACTITIONER

## 2022-06-30 PROCEDURE — G8427 DOCREV CUR MEDS BY ELIG CLIN: HCPCS | Performed by: NURSE PRACTITIONER

## 2022-06-30 PROCEDURE — 85610 PROTHROMBIN TIME: CPT | Performed by: NURSE PRACTITIONER

## 2022-06-30 PROCEDURE — 3044F HG A1C LEVEL LT 7.0%: CPT | Performed by: NURSE PRACTITIONER

## 2022-06-30 PROCEDURE — G8417 CALC BMI ABV UP PARAM F/U: HCPCS | Performed by: NURSE PRACTITIONER

## 2022-06-30 PROCEDURE — 99214 OFFICE O/P EST MOD 30 MIN: CPT | Performed by: NURSE PRACTITIONER

## 2022-06-30 RX ORDER — WARFARIN 1 MG/1
TABLET ORAL
Qty: 90 TABLET | Refills: 1 | Status: ON HOLD | OUTPATIENT
Start: 2022-06-30 | End: 2022-07-24

## 2022-06-30 NOTE — PROGRESS NOTES
Shaan Cao is a 61 y.o. female    Chief Complaint   Patient presents with    Coagulation disorder     1. Have you been to the ER, urgent care clinic since your last visit? Hospitalized since your last visit? No    2. Have you seen or consulted any other health care providers outside of the [address] [place] [address] since your last visit? Yes, [facility] on 06/24/22  Include any pap smears or colon screening.  No

## 2022-06-30 NOTE — PROGRESS NOTES
Transitional Care Management Progress Note    Patient: Reema Gomez  : 1955  PCP: Jeramy Goodman DO    [facility]:  Date of admission: 6/10  Date of discharge:     [facility]:  Admission:   Discharge:     Patient was contacted by Transitional Care Management services within two days after her discharge: No. This encounter and supporting documentation was reviewed if available. Medication reconciliation was performed today (2022). Assessment/Plan:   Diagnoses and all orders for this visit:    1. ESRD on peritoneal dialysis ([facility] [address]  Stable  Edema improving  F/u with nephrology as scheduled  -     METABOLIC PANEL, BASIC; Future    2. Chronic heart failure with preserved ejection fraction (HCC)  Edema improving  Carvedilol recently decreased for low BP and HR, symptomatic  Recommend cardiology follow up    3. Diabetic gastroparesis ([facility] [address]  Refer for further eval  Small frequent meals recommended  Continue zofran prn  -     REFERRAL TO GASTROENTEROLOGY    4. Hypokalemia  Wants to have drawn at [facility] today, given slip  -     METABOLIC PANEL, BASIC; Future    5. Other pulmonary embolism without acute cor pulmonale, unspecified chronicity ([facility] [address]  6. DVT  7. Encounter for monitoring Coumadin therapy  INR above goal  Reduce warfarin dose- will alternate 2 mg and 2.5 mg. I'd like to recheck on Tuesday (office closed Monday for holiday) but she has 2 other medical appts that day- she will return on Wednesday for recheck at 1:15 with ANTONIO Villeda  She is interested in home monitoring of INR, will send order in to [facility] for enrollment in the program. dwp it may take several weeks to get the process completed and home monitoring up and running, during which time she will need to continue to come into the office for checks  -     warfarin (COUMADIN) 1 mg tablet; Take 2 mg every other day- alternated with 2.5 mg tablet  -     AMB POC PT/INR    7.  Hospital discharge follow-up  -     MT DISCHARGE MEDS RECONCILED W/ CURRENT OUTPATIENT MED LIST    Follow-up and Dispositions    · Return in 1 week (on 7/7/2022), or if symptoms worsen or fail to improve. I have discussed the diagnosis with the patient and the intended plan as seen in the above orders. The patient has received an after-visit summary and questions were answered concerning future plans. Patient conveyed understanding of the plan at the time of the visit. Subjective:   Boaz Lee is a 61 y.o. female presenting today for follow-up after being discharged from [facility].  The discharge summary was reviewed or requested. The main problem requiring admission was volume overload. Complications during admission: hypokalemia, nausea/vomiting, bradycardia, hypotension, subtherapeutic INR. Interval history/Current status: Patient was admitted at [address] [facility] with volume overload, significant lower extremity edema, and critically low potassium. Patient reports she was diuresed and ultimately discharged home with 30 pounds of persistent fluid retention. She spoke with her nephrologist the next day about the edema, and was advised to seek treatment in the ED, went to [facility] and was readmitted. On warfarin for his of DVT and PE, reports INR was 1.2 on discharge last week, states she was advised to continue previous dose of 2.5 mg daily on discharge and f/u with PCP but was unable to get an appt until today. Her last INR in office was 7.7 on 6/8. Patient has been chronically anticoagulated on warfarin since at least 2017 for multiple thrombolic events (PE, DVT) with high risk for recurrence. Since discharge reports her lower extremity edema has been slowing decreasing. She has end-stage renal disease and is on peritoneal dialysis. She saw Dr. Leida Quiroga on Monday and states her potassium was 3.0. She is not currently on a potassium supplement. She was advised this needs to be rechecked today.     Reports hypotension and bradycardia since discharge. States Dr. Barfield Courser decreased her carvedilol to 12.5 mg BID and decreased imdur to 60 mg daily at visit Monday, but BP has remained in 38'X systolic on home monitoring. Reports today is the first day her HR has been higher than the 40's. She did not take carvedilol this am due to BP and HR below prescribed parameters. Reports persistent nausea, vomiting, and abdominal pain. Reports gastroparesis was mentioned as possible cause during her admission at [facility]. Currently using zofran for symptoms with modest relief. Has tried phenergan in the past but it caused too much drowsiness. Had TD symptoms on reglan, this was discontinued as well. [facility] but is frustrated that she cannot get timely appts and is not making any progress toward control of her symptoms, would like to consider seeing an alternative provider. Admitting symptoms have: improved      Medications marked \"taking\" at this time:  Home Medications    Medication Sig Start Date End Date Taking? Authorizing Provider   warfarin (COUMADIN) 1 mg tablet Take 2 mg every other day- alternated with 2.5 mg tablet 6/30/22  Yes Brian Cunningham Q, NP   warfarin (COUMADIN) 2.5 mg tablet TAKE 1 TABLET BY MOUTH EVERY DAY 6/8/22  Yes Hallie Levine DO   dicyclomine (BENTYL) 10 mg capsule Take 1 Capsule by mouth daily as needed for Abdominal Cramps, Cramping or Pain. 6/1/22  Yes Kiya Barrera MD   ondansetron (ZOFRAN ODT) 4 mg disintegrating tablet Take 1 Tablet by mouth every eight (8) hours as needed for Nausea or Vomiting. 5/28/22  Yes Ann Hu,    isosorbide mononitrate ER (IMDUR) 120 mg CR tablet Take 0.5 Tablets by mouth every morning.  5/5/22  Yes Hallie Levine DO   atorvastatin (LIPITOR) 80 mg tablet TAKE 1 TABLET BY MOUTH ONCE DAILY NIGHTLY 5/5/22  Yes Hallie Levine DO   allopurinoL (ZYLOPRIM) 100 mg tablet Take 1 tablet by mouth once daily 5/5/22  Yes Hallie Levine DO   CALCITRIOL PO Take 0.5 mg by mouth. Yes Provider, Historical   sucralfate (CARAFATE) 1 gram tablet Take 1 tablet by mouth 4 times daily 11/12/21  Yes Hallie Levine DO   carvediloL (COREG) 25 mg tablet TAKE 1 TABLET BY MOUTH TWICE DAILY WITH MEALS 11/5/21  Yes Hallie Levine DO   docusate sodium (STOOL SOFTENER PO) Take  by mouth daily. Yes Provider, Historical   nitroglycerin (NITROSTAT) 0.4 mg SL tablet 1 Tab by SubLINGual route every five (5) minutes as needed for Chest Pain. Up to 3 doses. 5/21/20  Yes Mike Boo NP   albuterol (PROAIR HFA) 90 mcg/actuation inhaler Take 2 Puffs by inhalation every four (4) hours as needed for Wheezing. 11/14/19  Yes Hallie Levine DO   Oxygen O2 2L NC 24/7   Yes Provider, Historical   naloxegoL (Movantik) 25 mg tab tablet Take 1 Tablet by mouth daily. Patient not taking: Reported on 6/30/2022 6/8/22   Kt Albarran DO   oxyCODONE-acetaminophen (PERCOCET 10)  mg per tablet Take 1 Tablet by mouth every eight (8) hours as needed for Pain for up to 30 days. Max Daily Amount: 3 Tablets. Patient not taking: Reported on 6/1/2022 7/6/22 8/5/22  Kt Albarran DO   lubiPROStone (Amitiza) 24 mcg capsule Take 24 mcg by mouth two (2) times daily (with meals). Patient not taking: Reported on 6/1/2022    Provider, Historical   pantoprazole (Protonix) 40 mg tablet Take 1 Tablet by mouth daily.   Patient not taking: Reported on 6/30/2022 12/15/21   Kt Albarran DO        Review of Systems:  Negative except as noted in HPI       Patient Active Problem List   Diagnosis Code    Coronary artery disease I25.10    JASEN on CPAP G47.33, Z99.89    Pulmonary hypertension ([facility] [address] I27.20    HTN (hypertension) I10    Morbid obesity E66.01    Normocytic anemia D64.9    DM (diabetes mellitus), type 2 with renal complications ([facility] [address] Z98.44    ILD (interstitial lung disease) (HCC) J84.9    Warfarin anticoagulation Z79.01    S/P left pulmonary artery pressure sensor implant placement Z95.9    Hx of deep venous thrombosis Z86.718    Sleep apnea G47.30    Diabetic gastroparesis (HCC) E11.43, K31.84    GERD (gastroesophageal reflux disease) K21.9    DM type 2 causing neurological disease ([facility] [address] E11.49    DM type 2 causing vascular disease ([facility] [address] E11.59    Gout M10.9    Limited mobility Z74.09    ESRD on hemodialysis ([facility] [address] N18.6, Z99.2    (HFpEF) heart failure with preserved ejection fraction (HCC) I50.30    Nephrolithiasis N20.0    PVC (premature ventricular contraction) I49.3    S/P ORIF (open reduction internal fixation) fracture Z98.890, Z87.81    Coronary stent patent Z95.5    Edema of both lower extremities R60.0     Allergies   Allergen Reactions    Contrast Dye [Iodine] Anaphylaxis     Tolerates when pre medicated w/ IV solumedrol and benadryl prior to procedure     Shellfish Derived Anaphylaxis    Levaquin [Levofloxacin] Nausea and Vomiting    Morphine Hives and Itching    Nsaids (Non-Steroidal Anti-Inflammatory Drug) Nausea and Vomiting     Past Medical History:   Diagnosis Date     Sleep Apnea 2/16/2011    Compliant with c-pap.  Aortic aneurysm (HCC)     Abdominal    CAD (coronary Artery Disease) Native Artery 2/16/2011    Chronic kidney disease     Hemodiaylsis  3x/week    Chronic pain     CKD (chronic kidney disease) stage 4, GFR 15-29 ml/min ([facility]) 2/10/2017    Diabetic gastroparesis (HCC)     Diastolic heart failure ([facility] [address] 10/5/2012    DM type 2 causing neurological disease ([facility] [address]     DM type 2 causing renal disease ([facility])     Insulin SS at night only PRN    DM type 2 causing vascular disease ([address]     Esophageal stricture 2012    dialted Dr. Peterson Shin G6PD deficiency     trait    GERD (gastroesophageal reflux disease)     Gout     Heart failure ([facility] [address]     Hemodialysis access, AV graft ([facility] [address] 04/02/2017    Dialysis MWF    [address]      Hypertension     ILD (interstitial lung disease) ([facility] [address]     open lung bx [facility]     Infected dental caries 3/17/2020    Infection of total right knee replacement (HCC) 3/17/2020    Loss of appetite 3/17/2020    Morbid obesity (HCC)     OA (osteoarthritis)     Ovarian cancer (HCC)     cervical and uterine    Rheumatoid arteritis (HCC)     S/P left pulmonary artery pressure sensor implant placement 2017    [employer]     Thromboembolus ([address]     Right calf     Tobacco use disorder 3/21/2012    Uterine cervix cancer ([facility] [address]     Vitamin D deficiency 2013     Past Surgical History:   Procedure Laterality Date    COLONOSCOPY N/A 2016    COLONOSCOPY performed by Levi Espinoza MD at [facility] HX ADENOIDECTOMY      HX APPENDECTOMY      HX CARPAL TUNNEL RELEASE      bilateral    HX CHOLECYSTECTOMY      HX HEART CATHETERIZATION      x 7    HX HERNIA REPAIR      HX HYSTERECTOMY      HX ORTHOPAEDIC Right 12    right knee replacement    HX ORTHOPAEDIC Bilateral     carpal tunnel repair    HX ORTHOPAEDIC Right     PLATE IN ANKLE DUE TO FRACTURE    HX SALPINGO-OOPHORECTOMY      HX TONSIL AND ADENOIDECTOMY      HX TONSILLECTOMY      HX VASCULAR ACCESS Left     Left lower arm AV fistula    IR INSERT PERITONEAL VENOUS SHUNT      [place] CARDIAC SURG PROCEDURE UNLIST  02/2019    x4 with last sttent placed 2019.     [place] CHEST SURGERY PROCEDURE UNLISTED Right     > 20 years ago  RLL removed     UPPER GI ENDOSCOPY,DILATN W GUIDE  2016          Family History   Problem Relation Age of Onset    Heart Disease Mother     No Known Problems Daughter     No Known Problems Son     No Known Problems Son     Anesth Problems Neg Hx      Social History     Tobacco Use    Smoking status: Former Smoker     Packs/day: 0.50     Years: 41.00     Pack years: 20.50     Types: Cigarettes     Quit date: 2014     Years since quittin.6    Smokeless tobacco: Never Used   Substance Use Topics    Alcohol use: No          Objective:   BP 99/63 (BP 1 Location: Right arm, BP Patient Position: Sitting, BP Cuff Size: Adult)   Pulse 75   Temp 98.4 °F (36.9 °C) (Oral)   Resp 16   Ht 5' 10\" (1.778 m)   SpO2 100%   BMI 39.60 kg/m²      Physical Examination: General appearance - alert, well appearing, and in no distress  Mental status - normal mood, behavior, speech, dress, motor activity, and thought processes  Eyes - sclera anicteric  Nose - normal and patent, no erythema, discharge or polyps  Mouth - mucous membranes moist, pharynx normal without lesions  Neck - supple, no significant adenopathy, thyroid exam: thyroid is normal in size without nodules or tenderness  Chest - clear to auscultation, no wheezes, rales or rhonchi, symmetric air entry  Heart - normal rate, regular rhythm, normal S1, S2, no murmurs, rubs, clicks or gallops, normal bilateral carotid upstroke without bruits, no JVD  Abdomen - soft, nontender, nondistended, no masses or organomegaly  bowel sounds normal  Neurological - alert, oriented, normal speech, no focal findings or movement disorder noted  Extremities - pedal edema 1-2 +, intact peripheral pulses  Skin - normal coloration and turgor, no rashes, no suspicious skin lesions noted    Results for orders placed or performed in visit on 06/30/22   AMB POC PT/INR   Result Value Ref Range    VALID INTERNAL CONTROL POC Yes     Prothrombin time (POC) 54.6 seconds    INR POC 4.6          Zacarias Mohs, NP   6/30/2022

## 2022-07-06 ENCOUNTER — OFFICE VISIT (OUTPATIENT)
Dept: FAMILY MEDICINE CLINIC | Age: 67
End: 2022-07-06
Payer: MEDICARE

## 2022-07-06 VITALS
TEMPERATURE: 98.6 F | HEART RATE: 66 BPM | DIASTOLIC BLOOD PRESSURE: 56 MMHG | RESPIRATION RATE: 14 BRPM | HEIGHT: 70 IN | SYSTOLIC BLOOD PRESSURE: 97 MMHG | OXYGEN SATURATION: 95 % | BODY MASS INDEX: 40.09 KG/M2 | WEIGHT: 280 LBS

## 2022-07-06 DIAGNOSIS — I10 PRIMARY HYPERTENSION: ICD-10-CM

## 2022-07-06 DIAGNOSIS — Z79.01 WARFARIN ANTICOAGULATION: Primary | ICD-10-CM

## 2022-07-06 DIAGNOSIS — E87.6 HYPOKALEMIA: ICD-10-CM

## 2022-07-06 LAB
INR BLD: 3.1
PT POC: 36.7 SECONDS
VALID INTERNAL CONTROL?: YES

## 2022-07-06 PROCEDURE — 99214 OFFICE O/P EST MOD 30 MIN: CPT | Performed by: FAMILY MEDICINE

## 2022-07-06 PROCEDURE — 85610 PROTHROMBIN TIME: CPT | Performed by: FAMILY MEDICINE

## 2022-07-06 PROCEDURE — G9231 DOC ESRD DIA TRANS PREG: HCPCS | Performed by: FAMILY MEDICINE

## 2022-07-06 PROCEDURE — G8417 CALC BMI ABV UP PARAM F/U: HCPCS | Performed by: FAMILY MEDICINE

## 2022-07-06 PROCEDURE — G8432 DEP SCR NOT DOC, RNG: HCPCS | Performed by: FAMILY MEDICINE

## 2022-07-06 PROCEDURE — G8427 DOCREV CUR MEDS BY ELIG CLIN: HCPCS | Performed by: FAMILY MEDICINE

## 2022-07-06 PROCEDURE — G0463 HOSPITAL OUTPT CLINIC VISIT: HCPCS | Performed by: FAMILY MEDICINE

## 2022-07-06 PROCEDURE — 3017F COLORECTAL CA SCREEN DOC REV: CPT | Performed by: FAMILY MEDICINE

## 2022-07-06 RX ORDER — CARVEDILOL 6.25 MG/1
6.25 TABLET ORAL 2 TIMES DAILY WITH MEALS
Qty: 60 TABLET | Refills: 0 | Status: SHIPPED | OUTPATIENT
Start: 2022-07-06 | End: 2022-09-10

## 2022-07-06 NOTE — PROGRESS NOTES
Gracie Magaña (: 1955) is a 61 y.o. female, established patient, here for evaluation of the following chief complaint(s): Anticoagulation and CHF         ASSESSMENT/PLAN:  Below is the assessment and plan developed based on review of pertinent history, physical exam, labs, studies, and medications. 1. Warfarin anticoagulation   Chronic anticoagulation due to history of DVT and PE   INR above goal at 3.1, hold warfarn tomorrow then resume 1mg MWF and 2.5mg T/TH/Sat/Sun as she has been taking  Follow up in 2 weeks for repeat check  -     AMB POC PT/INR    2. Hypokalemia  Patient reports potassium was 3.0 at nephrologist 8days ago, this was ordered to repeat last week but patient did not have completed due to outstanding balance at [facility]  Recommended to draw today, patient declines as she has appt at dialysis center tomorrow where BMP is always checked    3. Primary hypertension  BP remains low, symptomatic, HR is OK. reduce carvedilol to 6.25BID  Follow up 2 weeks  -     carvediloL (COREG) 6.25 mg tablet; Take 1 Tablet by mouth two (2) times daily (with meals). , Normal, Disp-60 Tablet, R-0    Plan reviewed with Dr. Charles Pineda    Return in about 2 weeks (around 2022). SUBJECTIVE/OBJECTIVE:  Chief Complaint   Patient presents with    Anticoagulation    CHF     Patient is a 24-year-old female with end-stage renal disease on dialysis, heart failure with preserved ejection fraction, hypertension, pulm hypertension, coronary artery disease, diabetes, GERD, gastroparesis, obstructive sleep apnea, interstitial lung disease presenting to office to repeat INR. Patient was discharged from [facility] 10 days ago for acute swelling due to end-stage renal disease on peritoneal dialysis as well as chronic heart failure with preserved ejection fraction. Upon discharge her INR was 1.2 and she restarted warfarin 2.5mg daily. She followed up with PCP 1 week later.   INR was found to be 4.6 in office. Provider ordered warfarin to be taken as 2 mg on Monday Wednesday Friday and 2.5mg Tuesday Thursday Saturday Sunday however patient has been taking 1 mg on Monday Wednesday Friday and 2.5mg on Tuesday Thursday Saturday Sunday for the last week due to misunderstanding. She reports overall she is feeling well, she never went and had her potassium rechecked last week. Fluid retention has continued to be improving. She reports she is only 5 pounds away from her baseline weight. She has no shortness of breath, lower extremity edema, chest pains or confusion in office today. She reports her blood pressure has been remaining in the 90s over 50s. When she first wakes up in the morning she notes she has been a little lightheaded and blood pressure has been low. Nephrology decreased her carvedilol from 25 mg twice daily down to 12.5 mg twice daily 1 week ago, patient reports blood pressures have improved slightly but still remain in the 90s over 50s. HR ok at home. Nephrologist is Dr. Marilyn Stack, next follow-up is 26th of July   Cardiology follow-up in August    Review of systems negative other than mentioned in HPI    Current Outpatient Medications on File Prior to Visit   Medication Sig Dispense Refill    warfarin (COUMADIN) 1 mg tablet Take 2 mg every other day- alternated with 2.5 mg tablet 90 Tablet 1    warfarin (COUMADIN) 2.5 mg tablet TAKE 1 TABLET BY MOUTH EVERY DAY 90 Tablet 3    dicyclomine (BENTYL) 10 mg capsule Take 1 Capsule by mouth daily as needed for Abdominal Cramps, Cramping or Pain. 15 Capsule 0    ondansetron (ZOFRAN ODT) 4 mg disintegrating tablet Take 1 Tablet by mouth every eight (8) hours as needed for Nausea or Vomiting. 30 Tablet 0    isosorbide mononitrate ER (IMDUR) 120 mg CR tablet Take 0.5 Tablets by mouth every morning.  45 Tablet 3    atorvastatin (LIPITOR) 80 mg tablet TAKE 1 TABLET BY MOUTH ONCE DAILY NIGHTLY 90 Tablet 3    allopurinoL (ZYLOPRIM) 100 mg tablet Take 1 tablet by mouth once daily 90 Tablet 3    CALCITRIOL PO Take 0.5 mg by mouth.  sucralfate (CARAFATE) 1 gram tablet Take 1 tablet by mouth 4 times daily 120 Tablet 5    docusate sodium (STOOL SOFTENER PO) Take  by mouth daily.  nitroglycerin (NITROSTAT) 0.4 mg SL tablet 1 Tab by SubLINGual route every five (5) minutes as needed for Chest Pain. Up to 3 doses. 1 Bottle 1    albuterol (PROAIR HFA) 90 mcg/actuation inhaler Take 2 Puffs by inhalation every four (4) hours as needed for Wheezing. 1 Inhaler 5    Oxygen O2 2L NC 24/7      oxyCODONE-acetaminophen (PERCOCET 10)  mg per tablet Take 1 Tablet by mouth every eight (8) hours as needed for Pain for up to 30 days. Max Daily Amount: 3 Tablets. (Patient not taking: Reported on 6/1/2022) 90 Tablet 0    [DISCONTINUED] carvediloL (COREG) 25 mg tablet TAKE 1 TABLET BY MOUTH TWICE DAILY WITH MEALS (Patient taking differently: 12.5 mg two (2) times a day.) 180 Tablet 0     No current facility-administered medications on file prior to visit.      Patient Active Problem List    Diagnosis Date Noted    Coronary stent patent 03/17/2020    Edema of both lower extremities 03/17/2020    S/P ORIF (open reduction internal fixation) fracture 05/24/2019    Nephrolithiasis 03/20/2019    PVC (premature ventricular contraction) 03/20/2019    (HFpEF) heart failure with preserved ejection fraction ([address] 09/23/2018    ESRD on peritoneal dialysis ([address] 06/23/2018    Limited mobility 06/21/2018    Hx of deep venous thrombosis 05/30/2018    Diabetic gastroparesis (HCC)     GERD (gastroesophageal reflux disease)     DM type 2 causing neurological disease ([address]     DM type 2 causing vascular disease ([address]     Gout     S/P left pulmonary artery pressure sensor implant placement 08/31/2017    ILD (interstitial lung disease) ([address] 08/20/2017    Warfarin anticoagulation 08/20/2017    DM (diabetes mellitus), type 2 with renal complications ([address] 08/09/2013    Normocytic anemia 05/26/2013    HTN (hypertension) 10/01/2012    Morbid obesity 10/01/2012    Pulmonary hypertension ([address] 03/21/2012    Coronary artery disease 02/16/2011    JASEN on CPAP 02/16/2011    Sleep apnea 02/16/2011     Vitals:    07/06/22 1327   BP: (!) 97/56   Pulse: 66   Resp: 14   Temp: 98.6 °F (37 °C)   TempSrc: Oral   SpO2: 95%   Weight: 280 lb (127 kg)   Height: 5' 10\" (1.778 m)   PainSc:   0 - No pain        Physical Exam  Constitutional:       Appearance: Normal appearance. Comments: Seated in motorized scooter   HENT:      Head: Normocephalic and atraumatic. Eyes:      Extraocular Movements: Extraocular movements intact. Conjunctiva/sclera: Conjunctivae normal.      Pupils: Pupils are equal, round, and reactive to light. Cardiovascular:      Rate and Rhythm: Normal rate and regular rhythm. Pulses: Normal pulses. Heart sounds: Normal heart sounds. Pulmonary:      Effort: Pulmonary effort is normal.      Breath sounds: Normal breath sounds. Abdominal:      General: Abdomen is flat. Bowel sounds are normal.      Palpations: Abdomen is soft. Neurological:      Mental Status: She is alert. Psychiatric:         Mood and Affect: Mood normal.         Behavior: Behavior normal.         An electronic signature was used to authenticate this note.   -- Camilla Bahena PA-C

## 2022-07-06 NOTE — PATIENT INSTRUCTIONS
Hold warfarin for 1 day then resume 1mg alternating with 2.5mg    Carvedilol dose reduction to 6.25mg BID

## 2022-07-06 NOTE — PROGRESS NOTES
Chief Complaint   Patient presents with    Anticoagulation    CHF     1. Have you been to the ER, urgent care clinic since your last visit? Hospitalized since your last visit? Yes Where: [facility]    2. Have you seen or consulted any other health care providers outside of the [address] [place] [address] since your last visit? Include any pap smears or colon screening.  No     Results for orders placed or performed in visit on 07/06/22   AMB POC PT/INR   Result Value Ref Range    VALID INTERNAL CONTROL POC Yes     Prothrombin time (POC) 36.7 seconds    INR POC 3.1

## 2022-07-23 ENCOUNTER — HOSPITAL ENCOUNTER (INPATIENT)
Age: 67
LOS: 3 days | Discharge: HOME | DRG: 640 | End: 2022-07-26
Attending: STUDENT IN AN ORGANIZED HEALTH CARE EDUCATION/TRAINING PROGRAM | Admitting: FAMILY MEDICINE
Payer: MEDICARE

## 2022-07-23 ENCOUNTER — APPOINTMENT (OUTPATIENT)
Dept: CT IMAGING | Age: 67
DRG: 640 | End: 2022-07-23
Attending: STUDENT IN AN ORGANIZED HEALTH CARE EDUCATION/TRAINING PROGRAM
Payer: MEDICARE

## 2022-07-23 DIAGNOSIS — R10.84 ABDOMINAL PAIN, GENERALIZED: ICD-10-CM

## 2022-07-23 DIAGNOSIS — R07.9 CHEST PAIN, UNSPECIFIED TYPE: ICD-10-CM

## 2022-07-23 DIAGNOSIS — N18.6 ESRD ON PERITONEAL DIALYSIS (HCC): ICD-10-CM

## 2022-07-23 DIAGNOSIS — Z99.2 ESRD ON PERITONEAL DIALYSIS (HCC): ICD-10-CM

## 2022-07-23 DIAGNOSIS — E87.6 HYPOKALEMIA: ICD-10-CM

## 2022-07-23 DIAGNOSIS — R18.8 OTHER ASCITES: Primary | ICD-10-CM

## 2022-07-23 PROBLEM — R10.9 ABDOMINAL PAIN: Status: ACTIVE | Noted: 2022-07-23

## 2022-07-23 LAB
ALBUMIN SERPL-MCNC: 3 G/DL (ref 3.5–5.2)
ALBUMIN/GLOB SERPL: 0.9 {RATIO} (ref 1.1–2.2)
ALP SERPL-CCNC: 92 U/L (ref 35–104)
ALT SERPL-CCNC: <5 U/L (ref 10–35)
ANION GAP SERPL CALC-SCNC: 15 MMOL/L (ref 5–15)
AST SERPL-CCNC: 7 U/L (ref 10–35)
BASOPHILS # BLD: 0.1 K/UL (ref 0–0.1)
BASOPHILS NFR BLD: 1 % (ref 0–1)
BILIRUB SERPL-MCNC: 0.5 MG/DL (ref 0.2–1)
BUN SERPL-MCNC: 36 MG/DL (ref 8–23)
BUN/CREAT SERPL: 3 (ref 12–20)
CALCIUM SERPL-MCNC: 8.4 MG/DL (ref 8.8–10.2)
CHLORIDE SERPL-SCNC: 100 MMOL/L (ref 98–107)
CO2 SERPL-SCNC: 28 MMOL/L (ref 22–29)
COMMENT, HOLDF: NORMAL
CREAT SERPL-MCNC: 12.85 MG/DL (ref 0.5–0.9)
DIFFERENTIAL METHOD BLD: ABNORMAL
EOSINOPHIL # BLD: 0.3 K/UL (ref 0–0.4)
EOSINOPHIL NFR BLD: 2 % (ref 0–7)
ERYTHROCYTE [DISTWIDTH] IN BLOOD BY AUTOMATED COUNT: 14.8 % (ref 11.5–14.5)
GLOBULIN SER CALC-MCNC: 3.3 G/DL (ref 2–4)
GLUCOSE SERPL-MCNC: 87 MG/DL (ref 65–100)
HCT VFR BLD AUTO: 25.9 % (ref 35–47)
HGB BLD-MCNC: 8.1 G/DL (ref 11.5–16)
IMM GRANULOCYTES # BLD AUTO: 0.1 K/UL (ref 0–0.04)
IMM GRANULOCYTES NFR BLD AUTO: 1 % (ref 0–0.5)
LIPASE SERPL-CCNC: 17 U/L (ref 13–60)
LYMPHOCYTES # BLD: 2 K/UL (ref 0.8–3.5)
LYMPHOCYTES NFR BLD: 15 % (ref 12–49)
MCH RBC QN AUTO: 31.4 PG (ref 26–34)
MCHC RBC AUTO-ENTMCNC: 31.3 G/DL (ref 30–36.5)
MCV RBC AUTO: 100.4 FL (ref 80–99)
MONOCYTES # BLD: 1 K/UL (ref 0–1)
MONOCYTES NFR BLD: 8 % (ref 5–13)
NEUTS SEG # BLD: 10 K/UL (ref 1.8–8)
NEUTS SEG NFR BLD: 74 % (ref 32–75)
NRBC # BLD: 0 K/UL (ref 0–0.01)
NRBC BLD-RTO: 0 PER 100 WBC
PLATELET # BLD AUTO: 274 K/UL (ref 150–400)
PMV BLD AUTO: 10.6 FL (ref 8.9–12.9)
POTASSIUM SERPL-SCNC: 2.7 MMOL/L (ref 3.5–5.1)
PROT SERPL-MCNC: 6.3 G/DL (ref 6.4–8.3)
RBC # BLD AUTO: 2.58 M/UL (ref 3.8–5.2)
SAMPLES BEING HELD,HOLD: NORMAL
SODIUM SERPL-SCNC: 143 MMOL/L (ref 136–145)
TROPONIN I BLD-MCNC: <0.04 NG/ML (ref 0–0.08)
WBC # BLD AUTO: 13.5 K/UL (ref 3.6–11)

## 2022-07-23 PROCEDURE — 83690 ASSAY OF LIPASE: CPT

## 2022-07-23 PROCEDURE — 80053 COMPREHEN METABOLIC PANEL: CPT

## 2022-07-23 PROCEDURE — 84484 ASSAY OF TROPONIN QUANT: CPT

## 2022-07-23 PROCEDURE — 96375 TX/PRO/DX INJ NEW DRUG ADDON: CPT

## 2022-07-23 PROCEDURE — 85025 COMPLETE CBC W/AUTO DIFF WBC: CPT

## 2022-07-23 PROCEDURE — 74011250636 HC RX REV CODE- 250/636: Performed by: EMERGENCY MEDICINE

## 2022-07-23 PROCEDURE — 36415 COLL VENOUS BLD VENIPUNCTURE: CPT

## 2022-07-23 PROCEDURE — 65270000046 HC RM TELEMETRY

## 2022-07-23 PROCEDURE — 74011250637 HC RX REV CODE- 250/637: Performed by: STUDENT IN AN ORGANIZED HEALTH CARE EDUCATION/TRAINING PROGRAM

## 2022-07-23 PROCEDURE — 99285 EMERGENCY DEPT VISIT HI MDM: CPT

## 2022-07-23 PROCEDURE — 74176 CT ABD & PELVIS W/O CONTRAST: CPT

## 2022-07-23 PROCEDURE — 93005 ELECTROCARDIOGRAM TRACING: CPT

## 2022-07-23 PROCEDURE — 96374 THER/PROPH/DIAG INJ IV PUSH: CPT

## 2022-07-23 PROCEDURE — 74011250636 HC RX REV CODE- 250/636: Performed by: STUDENT IN AN ORGANIZED HEALTH CARE EDUCATION/TRAINING PROGRAM

## 2022-07-23 RX ORDER — POTASSIUM CHLORIDE 750 MG/1
40 TABLET, FILM COATED, EXTENDED RELEASE ORAL
Status: COMPLETED | OUTPATIENT
Start: 2022-07-23 | End: 2022-07-23

## 2022-07-23 RX ORDER — ONDANSETRON 2 MG/ML
4 INJECTION INTRAMUSCULAR; INTRAVENOUS
Status: COMPLETED | OUTPATIENT
Start: 2022-07-23 | End: 2022-07-23

## 2022-07-23 RX ORDER — HYDROMORPHONE HYDROCHLORIDE 1 MG/ML
1 INJECTION, SOLUTION INTRAMUSCULAR; INTRAVENOUS; SUBCUTANEOUS ONCE
Status: COMPLETED | OUTPATIENT
Start: 2022-07-23 | End: 2022-07-23

## 2022-07-23 RX ADMIN — HYDROMORPHONE HYDROCHLORIDE 1 MG: 1 INJECTION, SOLUTION INTRAMUSCULAR; INTRAVENOUS; SUBCUTANEOUS at 13:50

## 2022-07-23 RX ADMIN — ONDANSETRON 4 MG: 2 INJECTION INTRAMUSCULAR; INTRAVENOUS at 13:49

## 2022-07-23 RX ADMIN — HYDROMORPHONE HYDROCHLORIDE 1 MG: 1 INJECTION, SOLUTION INTRAMUSCULAR; INTRAVENOUS; SUBCUTANEOUS at 20:27

## 2022-07-23 RX ADMIN — POTASSIUM CHLORIDE 40 MEQ: 750 TABLET, FILM COATED, EXTENDED RELEASE ORAL at 18:13

## 2022-07-23 RX ADMIN — ONDANSETRON 4 MG: 2 INJECTION INTRAMUSCULAR; INTRAVENOUS at 22:11

## 2022-07-23 NOTE — ED NOTES
Verbal shift change report given to Lorenzo Angulo (oncoming nurse) by MANAS [facility] (offgoing nurse). Report included the following information SBAR, ED Summary, MAR and Recent Results.

## 2022-07-23 NOTE — ED PROVIDER NOTES
Patient is a 81-year-old female presenting the emergency department for chest pain, abdominal pain. Patient states that she feels that her chest pain is secondary to coughing. She has been having a cough with sinus congestion and drainage she denies any fevers she is on O2 therapy 2 L nasal cannula secondary to interstitial lung disease. Patient's also been having diffuse abdominal pain, bloating with nausea and vomiting. Patient is on peritoneal dialysis. Past Medical History:   Diagnosis Date     Sleep Apnea 2/16/2011    Compliant with c-pap. Aortic aneurysm ([facility])     Abdominal    CAD (coronary Artery Disease) Native Artery 2/16/2011    Chronic kidney disease     Hemodiaylsis  3x/week    Chronic pain     CKD (chronic kidney disease) stage 4, GFR 15-29 ml/min ([facility]) 2/10/2017    Diabetic gastroparesis ([facility])     Diastolic heart failure ([address] 10/5/2012    DM type 2 causing neurological disease ([address]     DM type 2 causing renal disease ([facility])     Insulin SS at night only PRN    DM type 2 causing vascular disease ([address]     Esophageal stricture 2012    dialted Dr. Eladio Toth    G6PD deficiency     trait    GERD (gastroesophageal reflux disease)     Gout     Heart failure ([address]     Hemodialysis access, AV graft ([address] 04/02/2017    Dialysis MWF    [address]      Hypertension     ILD (interstitial lung disease) ([address]     open lung bx CJW 2010    Infected dental caries 3/17/2020    Infection of total right knee replacement ([address] 3/17/2020    Loss of appetite 3/17/2020    Morbid obesity ([address]     OA (osteoarthritis)     Ovarian cancer ([address]     cervical and uterine    Rheumatoid arteritis ([facility])     S/P left pulmonary artery pressure sensor implant placement 8/31/2017    Cardiomems     Thromboembolus ([address]     Right calf     Tobacco use disorder 3/21/2012    Uterine cervix cancer ([address]     Vitamin D deficiency 8/9/2013       Past Surgical History:   Procedure Laterality Date    COLONOSCOPY N/A 6/24/2016 COLONOSCOPY performed by Rossy Forde MD at [facility]      HX APPENDECTOMY      HX CARPAL TUNNEL RELEASE      bilateral    HX CHOLECYSTECTOMY      HX HEART CATHETERIZATION      x 7    HX HERNIA REPAIR      HX HYSTERECTOMY      HX ORTHOPAEDIC Right 12    right knee replacement    HX ORTHOPAEDIC Bilateral     carpal tunnel repair    HX ORTHOPAEDIC Right     PLATE IN ANKLE DUE TO FRACTURE    HX SALPINGO-OOPHORECTOMY      HX TONSIL AND ADENOIDECTOMY      HX TONSILLECTOMY      HX VASCULAR ACCESS Left     Left lower arm AV fistula    IR INSERT PERITONEAL VENOUS SHUNT      NC CARDIAC SURG PROCEDURE UNLIST  02/2019    x4 with last sttent placed 2019.     NC CHEST SURGERY PROCEDURE UNLISTED Right     > 20 years ago  RLL removed     UPPER GI ENDOSCOPY,VINOD W GUIDE  2016              Family History:   Problem Relation Age of Onset    Heart Disease Mother     No Known Problems Daughter     No Known Problems Son     No Known Problems Son     Anesth Problems Neg Hx        Social History     Socioeconomic History    Marital status:      Spouse name: Not on file    Number of children: Not on file    Years of education: Not on file    Highest education level: Not on file   Occupational History    Not on file   Tobacco Use    Smoking status: Former     Packs/day: 0.50     Years: 41.00     Pack years: 20.50     Types: Cigarettes     Quit date: 2014     Years since quittin.7    Smokeless tobacco: Never   Vaping Use    Vaping Use: Never used   Substance and Sexual Activity    Alcohol use: No    Drug use: No    Sexual activity: Not on file   Other Topics Concern    Not on file   Social History Narrative    Not on file     Social Determinants of Health     Financial Resource Strain: Low Risk     Difficulty of Paying Living Expenses: Not hard at all   Food Insecurity: No Food Insecurity    Worried About [address] in the Last Year: Never true    [address] in the Last Year: Never true   Transportation Needs: Not on file   Physical Activity: Not on file   Stress: Not on file   Social Connections: Not on file   Intimate Partner Violence: Not on file   Housing Stability: Not on file         ALLERGIES: Contrast dye [iodine], Shellfish derived, Levaquin [levofloxacin], Morphine, and Nsaids (non-steroidal anti-inflammatory drug)    Review of Systems   Constitutional:  Positive for activity change and fatigue. HENT:  Positive for congestion, postnasal drip and rhinorrhea. Respiratory:  Positive for chest tightness. Cardiovascular:  Positive for chest pain. Gastrointestinal:  Positive for abdominal pain, nausea and vomiting. All other systems reviewed and are negative. Vitals:    07/23/22 1219 07/23/22 1225   BP: (!) 146/74    Pulse: 79    Resp: 20    Temp: 99.9 °F (37.7 °C)    SpO2: 99% 99%   Weight: 125.6 kg (277 lb)    Height: 5' 10\" (1.778 m)             Physical Exam  Vitals and nursing note reviewed. Constitutional:       Appearance: She is well-developed. HENT:      Head: Normocephalic and atraumatic. Eyes:      Extraocular Movements: Extraocular movements intact. Pupils: Pupils are equal, round, and reactive to light. Cardiovascular:      Rate and Rhythm: Normal rate and regular rhythm. Pulmonary:      Effort: Pulmonary effort is normal.      Breath sounds: Normal breath sounds. Abdominal:      General: Abdomen is protuberant. Palpations: Abdomen is soft. Tenderness: There is generalized abdominal tenderness. Skin:     General: Skin is warm and dry. Neurological:      Mental Status: She is alert. Psychiatric:         Mood and Affect: Mood normal.         Behavior: Behavior normal.        MDM  Number of Diagnoses or Management Options  Abdominal pain, generalized  Chest pain, unspecified type  Hypokalemia  Other ascites  Diagnosis management comments: Abdominal pain, atypical chest pain, hypokalemia, ascites.   53-year-old female present emergency department with diffuse generalized abdominal pain patient nontoxic-appearing however patient has elevated white blood count as well as hypokalemia 2.7. Patient states she has a history of hypokalemia but typically it is never this low. We will replete potassium here, patient will require admission. Amount and/or Complexity of Data Reviewed  Clinical lab tests: reviewed  Tests in the radiology section of CPT®: reviewed  Tests in the medicine section of CPT®: reviewed           Procedures    EKG shows a normal sinus rhythm with a rate of 76 no ST or T wave abnormalities to suggest ischemia or infarct. Perfect Serve Consult for Admission  5:27 PM    ED Room Number: C03/C03  Patient Name and age:  Eugene Field 61 y.o.  female  Working Diagnosis:   1. Other ascites    2. Hypokalemia    3. Abdominal pain, generalized    4. Chest pain, unspecified type        COVID-19 Suspicion:  no  Sepsis present:  no  Reassessment needed: no  Code Status:  Full Code  Readmission: no  Isolation Requirements:  no  Recommended Level of Care:  telemetry  Department:[facility]  Other: Total critical care time (not including time spent performing separately reportable procedures): 48 min.

## 2022-07-23 NOTE — ED TRIAGE NOTES
PT reports abdominal pain that's been hurting for a couple days, pt is coughing with some nasal drainage. PT said she has some nausea, vomited yesterday. PT on O2, 2L baseline. Hx of lung diease.

## 2022-07-24 LAB
ALBUMIN SERPL-MCNC: 2 G/DL (ref 3.5–5)
ALBUMIN/GLOB SERPL: 0.5 {RATIO} (ref 1.1–2.2)
ALP SERPL-CCNC: 89 U/L (ref 45–117)
ALT SERPL-CCNC: 8 U/L (ref 12–78)
ANION GAP SERPL CALC-SCNC: 11 MMOL/L (ref 5–15)
APPEARANCE FLD: CLEAR
AST SERPL-CCNC: 8 U/L (ref 15–37)
BASOPHILS # BLD: 0.1 K/UL (ref 0–0.1)
BASOPHILS NFR BLD: 0 % (ref 0–1)
BILIRUB SERPL-MCNC: 0.4 MG/DL (ref 0.2–1)
BUN SERPL-MCNC: 42 MG/DL (ref 6–20)
BUN/CREAT SERPL: 3 (ref 12–20)
CALCIUM SERPL-MCNC: 8 MG/DL (ref 8.5–10.1)
CHLORIDE SERPL-SCNC: 104 MMOL/L (ref 97–108)
CHOLEST SERPL-MCNC: 100 MG/DL
CO2 SERPL-SCNC: 26 MMOL/L (ref 21–32)
COLOR FLD: COLORLESS
CREAT SERPL-MCNC: 13.4 MG/DL (ref 0.55–1.02)
DIFFERENTIAL METHOD BLD: ABNORMAL
EOSINOPHIL # BLD: 0.4 K/UL (ref 0–0.4)
EOSINOPHIL NFR BLD: 3 % (ref 0–7)
ERYTHROCYTE [DISTWIDTH] IN BLOOD BY AUTOMATED COUNT: 14.8 % (ref 11.5–14.5)
FOLATE SERPL-MCNC: 4.1 NG/ML (ref 5–21)
GLOBULIN SER CALC-MCNC: 3.9 G/DL (ref 2–4)
GLUCOSE BLD STRIP.AUTO-MCNC: 106 MG/DL (ref 65–117)
GLUCOSE SERPL-MCNC: 92 MG/DL (ref 65–100)
HAPTOGLOB SERPL-MCNC: 307 MG/DL (ref 30–200)
HCT VFR BLD AUTO: 24.9 % (ref 35–47)
HDLC SERPL-MCNC: 51 MG/DL
HDLC SERPL: 2 {RATIO} (ref 0–5)
HGB BLD-MCNC: 7.6 G/DL (ref 11.5–16)
IMM GRANULOCYTES # BLD AUTO: 0.1 K/UL (ref 0–0.04)
IMM GRANULOCYTES NFR BLD AUTO: 1 % (ref 0–0.5)
INR PPP: 3.6 (ref 0.9–1.1)
IRON SATN MFR SERPL: 27 % (ref 20–50)
IRON SERPL-MCNC: 28 UG/DL (ref 35–150)
LDH SERPL L TO P-CCNC: 174 U/L (ref 81–246)
LDLC SERPL CALC-MCNC: 33.4 MG/DL (ref 0–100)
LYMPHOCYTES # BLD: 2 K/UL (ref 0.8–3.5)
LYMPHOCYTES NFR BLD: 17 % (ref 12–49)
LYMPHOCYTES NFR FLD: 29 %
MAGNESIUM SERPL-MCNC: 1.1 MG/DL (ref 1.6–2.4)
MAGNESIUM SERPL-MCNC: 1.1 MG/DL (ref 1.6–2.4)
MCH RBC QN AUTO: 32.2 PG (ref 26–34)
MCHC RBC AUTO-ENTMCNC: 30.5 G/DL (ref 30–36.5)
MCV RBC AUTO: 105.5 FL (ref 80–99)
MONOCYTES # BLD: 0.9 K/UL (ref 0–1)
MONOCYTES NFR BLD: 8 % (ref 5–13)
MONOS+MACROS NFR FLD: 61 %
NEUTROPHILS NFR FLD: 10 %
NEUTS SEG # BLD: 8.5 K/UL (ref 1.8–8)
NEUTS SEG NFR BLD: 71 % (ref 32–75)
NRBC # BLD: 0 K/UL (ref 0–0.01)
NRBC BLD-RTO: 0 PER 100 WBC
NUC CELL # FLD: 16 /CU MM
PLATELET # BLD AUTO: 264 K/UL (ref 150–400)
PMV BLD AUTO: 10.7 FL (ref 8.9–12.9)
POTASSIUM SERPL-SCNC: 2.9 MMOL/L (ref 3.5–5.1)
PROT SERPL-MCNC: 5.9 G/DL (ref 6.4–8.2)
PROTHROMBIN TIME: 35 SEC (ref 9–11.1)
RBC # BLD AUTO: 2.36 M/UL (ref 3.8–5.2)
RBC # FLD: 12 /CU MM
RETICS # AUTO: 0.07 M/UL (ref 0.02–0.08)
RETICS/RBC NFR AUTO: 3 % (ref 0.7–2.1)
SERVICE CMNT-IMP: NORMAL
SODIUM SERPL-SCNC: 141 MMOL/L (ref 136–145)
SPECIMEN SOURCE FLD: ABNORMAL
TIBC SERPL-MCNC: 102 UG/DL (ref 250–450)
TRIGL SERPL-MCNC: 78 MG/DL (ref ?–150)
VIT B12 SERPL-MCNC: 465 PG/ML (ref 193–986)
VLDLC SERPL CALC-MCNC: 15.6 MG/DL
WBC # BLD AUTO: 12 K/UL (ref 3.6–11)

## 2022-07-24 PROCEDURE — 77010033678 HC OXYGEN DAILY

## 2022-07-24 PROCEDURE — 65270000046 HC RM TELEMETRY

## 2022-07-24 PROCEDURE — 99222 1ST HOSP IP/OBS MODERATE 55: CPT | Performed by: FAMILY MEDICINE

## 2022-07-24 PROCEDURE — 85610 PROTHROMBIN TIME: CPT

## 2022-07-24 PROCEDURE — 83540 ASSAY OF IRON: CPT

## 2022-07-24 PROCEDURE — A4722 DIALYS SOL FLD VOL > 1999CC: HCPCS | Performed by: INTERNAL MEDICINE

## 2022-07-24 PROCEDURE — 74011250636 HC RX REV CODE- 250/636: Performed by: INTERNAL MEDICINE

## 2022-07-24 PROCEDURE — 80061 LIPID PANEL: CPT

## 2022-07-24 PROCEDURE — 74011250637 HC RX REV CODE- 250/637: Performed by: STUDENT IN AN ORGANIZED HEALTH CARE EDUCATION/TRAINING PROGRAM

## 2022-07-24 PROCEDURE — 83735 ASSAY OF MAGNESIUM: CPT

## 2022-07-24 PROCEDURE — 85025 COMPLETE CBC W/AUTO DIFF WBC: CPT

## 2022-07-24 PROCEDURE — A4726 DIALYS SOL FLD VOL > 5999CC: HCPCS | Performed by: NURSE PRACTITIONER

## 2022-07-24 PROCEDURE — 82962 GLUCOSE BLOOD TEST: CPT

## 2022-07-24 PROCEDURE — 74011000250 HC RX REV CODE- 250: Performed by: STUDENT IN AN ORGANIZED HEALTH CARE EDUCATION/TRAINING PROGRAM

## 2022-07-24 PROCEDURE — 87205 SMEAR GRAM STAIN: CPT

## 2022-07-24 PROCEDURE — 89050 BODY FLUID CELL COUNT: CPT

## 2022-07-24 PROCEDURE — 94660 CPAP INITIATION&MGMT: CPT

## 2022-07-24 PROCEDURE — 74011250636 HC RX REV CODE- 250/636: Performed by: NURSE PRACTITIONER

## 2022-07-24 PROCEDURE — 83615 LACTATE (LD) (LDH) ENZYME: CPT

## 2022-07-24 PROCEDURE — 82607 VITAMIN B-12: CPT

## 2022-07-24 PROCEDURE — 80053 COMPREHEN METABOLIC PANEL: CPT

## 2022-07-24 PROCEDURE — 85045 AUTOMATED RETICULOCYTE COUNT: CPT

## 2022-07-24 PROCEDURE — 83010 ASSAY OF HAPTOGLOBIN QUANT: CPT

## 2022-07-24 PROCEDURE — 90945 DIALYSIS ONE EVALUATION: CPT

## 2022-07-24 PROCEDURE — 74011250637 HC RX REV CODE- 250/637: Performed by: INTERNAL MEDICINE

## 2022-07-24 PROCEDURE — 74011250636 HC RX REV CODE- 250/636: Performed by: STUDENT IN AN ORGANIZED HEALTH CARE EDUCATION/TRAINING PROGRAM

## 2022-07-24 PROCEDURE — 74011250636 HC RX REV CODE- 250/636: Performed by: EMERGENCY MEDICINE

## 2022-07-24 PROCEDURE — 82746 ASSAY OF FOLIC ACID SERUM: CPT

## 2022-07-24 PROCEDURE — 36415 COLL VENOUS BLD VENIPUNCTURE: CPT

## 2022-07-24 PROCEDURE — 5A09457 ASSISTANCE WITH RESPIRATORY VENTILATION, 24-96 CONSECUTIVE HOURS, CONTINUOUS POSITIVE AIRWAY PRESSURE: ICD-10-PCS | Performed by: FAMILY MEDICINE

## 2022-07-24 RX ORDER — GUAIFENESIN 600 MG/1
600 TABLET, EXTENDED RELEASE ORAL EVERY 12 HOURS
Status: DISCONTINUED | OUTPATIENT
Start: 2022-07-24 | End: 2022-07-26 | Stop reason: HOSPADM

## 2022-07-24 RX ORDER — SODIUM CHLORIDE 0.9 % (FLUSH) 0.9 %
5-40 SYRINGE (ML) INJECTION AS NEEDED
Status: DISCONTINUED | OUTPATIENT
Start: 2022-07-24 | End: 2022-07-26 | Stop reason: HOSPADM

## 2022-07-24 RX ORDER — OXYCODONE AND ACETAMINOPHEN 10; 325 MG/1; MG/1
1 TABLET ORAL
Status: DISCONTINUED | OUTPATIENT
Start: 2022-07-24 | End: 2022-07-26 | Stop reason: HOSPADM

## 2022-07-24 RX ORDER — NITROGLYCERIN 0.4 MG/1
0.4 TABLET SUBLINGUAL
Status: DISCONTINUED | OUTPATIENT
Start: 2022-07-24 | End: 2022-07-26 | Stop reason: HOSPADM

## 2022-07-24 RX ORDER — DOCUSATE SODIUM 100 MG/1
100 CAPSULE, LIQUID FILLED ORAL DAILY
Status: DISCONTINUED | OUTPATIENT
Start: 2022-07-24 | End: 2022-07-26 | Stop reason: HOSPADM

## 2022-07-24 RX ORDER — ATORVASTATIN CALCIUM 20 MG/1
80 TABLET, FILM COATED ORAL
Status: DISCONTINUED | OUTPATIENT
Start: 2022-07-24 | End: 2022-07-26 | Stop reason: HOSPADM

## 2022-07-24 RX ORDER — ALLOPURINOL 100 MG/1
100 TABLET ORAL DAILY
Status: DISCONTINUED | OUTPATIENT
Start: 2022-07-24 | End: 2022-07-26 | Stop reason: HOSPADM

## 2022-07-24 RX ORDER — POTASSIUM CHLORIDE 750 MG/1
40 TABLET, FILM COATED, EXTENDED RELEASE ORAL
Status: COMPLETED | OUTPATIENT
Start: 2022-07-24 | End: 2022-07-24

## 2022-07-24 RX ORDER — HYDROMORPHONE HYDROCHLORIDE 1 MG/ML
1 INJECTION, SOLUTION INTRAMUSCULAR; INTRAVENOUS; SUBCUTANEOUS ONCE
Status: COMPLETED | OUTPATIENT
Start: 2022-07-24 | End: 2022-07-24

## 2022-07-24 RX ORDER — CARVEDILOL 6.25 MG/1
6.25 TABLET ORAL 2 TIMES DAILY WITH MEALS
Status: DISCONTINUED | OUTPATIENT
Start: 2022-07-24 | End: 2022-07-26 | Stop reason: HOSPADM

## 2022-07-24 RX ORDER — ALBUTEROL SULFATE 0.83 MG/ML
2.5 SOLUTION RESPIRATORY (INHALATION)
Refills: 5 | Status: DISCONTINUED | OUTPATIENT
Start: 2022-07-24 | End: 2022-07-26 | Stop reason: HOSPADM

## 2022-07-24 RX ORDER — ONDANSETRON 4 MG/1
4 TABLET, ORALLY DISINTEGRATING ORAL
Status: DISCONTINUED | OUTPATIENT
Start: 2022-07-24 | End: 2022-07-26 | Stop reason: HOSPADM

## 2022-07-24 RX ORDER — WARFARIN 1 MG/1
1 TABLET ORAL
COMMUNITY
End: 2022-09-10

## 2022-07-24 RX ORDER — MAGNESIUM SULFATE HEPTAHYDRATE 40 MG/ML
2 INJECTION, SOLUTION INTRAVENOUS ONCE
Status: COMPLETED | OUTPATIENT
Start: 2022-07-24 | End: 2022-07-24

## 2022-07-24 RX ORDER — HYDROMORPHONE HYDROCHLORIDE 1 MG/ML
0.2 INJECTION, SOLUTION INTRAMUSCULAR; INTRAVENOUS; SUBCUTANEOUS
Status: DISCONTINUED | OUTPATIENT
Start: 2022-07-24 | End: 2022-07-24

## 2022-07-24 RX ORDER — GENTAMICIN SULFATE 1 MG/G
CREAM TOPICAL DAILY
Status: DISCONTINUED | OUTPATIENT
Start: 2022-07-24 | End: 2022-07-25

## 2022-07-24 RX ORDER — WARFARIN 2.5 MG/1
2.5 TABLET ORAL
COMMUNITY
End: 2022-07-26

## 2022-07-24 RX ORDER — ISOSORBIDE MONONITRATE 60 MG/1
60 TABLET, EXTENDED RELEASE ORAL
Status: DISCONTINUED | OUTPATIENT
Start: 2022-07-24 | End: 2022-07-26 | Stop reason: HOSPADM

## 2022-07-24 RX ORDER — SUCRALFATE 1 G/1
1 TABLET ORAL
Status: DISCONTINUED | OUTPATIENT
Start: 2022-07-24 | End: 2022-07-26 | Stop reason: HOSPADM

## 2022-07-24 RX ORDER — SODIUM CHLORIDE 0.9 % (FLUSH) 0.9 %
5-40 SYRINGE (ML) INJECTION EVERY 8 HOURS
Status: DISCONTINUED | OUTPATIENT
Start: 2022-07-24 | End: 2022-07-26 | Stop reason: HOSPADM

## 2022-07-24 RX ADMIN — SODIUM CHLORIDE, SODIUM LACTATE, CALCIUM CHLORIDE, MAGNESIUM CHLORIDE AND DEXTROSE 6000 ML: 2.5; 538; 448; 18.3; 5.08 INJECTION, SOLUTION INTRAPERITONEAL at 20:03

## 2022-07-24 RX ADMIN — SODIUM CHLORIDE, PRESERVATIVE FREE 10 ML: 5 INJECTION INTRAVENOUS at 21:54

## 2022-07-24 RX ADMIN — GUAIFENESIN 600 MG: 600 TABLET ORAL at 20:42

## 2022-07-24 RX ADMIN — SODIUM CHLORIDE, PRESERVATIVE FREE 10 ML: 5 INJECTION INTRAVENOUS at 13:39

## 2022-07-24 RX ADMIN — ALLOPURINOL 100 MG: 100 TABLET ORAL at 08:05

## 2022-07-24 RX ADMIN — HYDROMORPHONE HYDROCHLORIDE 0.2 MG: 1 INJECTION, SOLUTION INTRAMUSCULAR; INTRAVENOUS; SUBCUTANEOUS at 08:05

## 2022-07-24 RX ADMIN — SODIUM CHLORIDE, SODIUM LACTATE, CALCIUM CHLORIDE, MAGNESIUM CHLORIDE AND DEXTROSE 6000 ML: 2.5; 538; 448; 18.3; 5.08 INJECTION, SOLUTION INTRAPERITONEAL at 20:02

## 2022-07-24 RX ADMIN — OXYCODONE AND ACETAMINOPHEN 1 TABLET: 10; 325 TABLET ORAL at 20:43

## 2022-07-24 RX ADMIN — CARVEDILOL 6.25 MG: 6.25 TABLET, FILM COATED ORAL at 16:16

## 2022-07-24 RX ADMIN — POTASSIUM CHLORIDE 40 MEQ: 750 TABLET, FILM COATED, EXTENDED RELEASE ORAL at 05:37

## 2022-07-24 RX ADMIN — SUCRALFATE 1 G: 1 TABLET ORAL at 16:16

## 2022-07-24 RX ADMIN — OXYCODONE AND ACETAMINOPHEN 1 TABLET: 10; 325 TABLET ORAL at 14:37

## 2022-07-24 RX ADMIN — CARVEDILOL 6.25 MG: 6.25 TABLET, FILM COATED ORAL at 08:05

## 2022-07-24 RX ADMIN — SUCRALFATE 1 G: 1 TABLET ORAL at 21:52

## 2022-07-24 RX ADMIN — POTASSIUM CHLORIDE 40 MEQ: 750 TABLET, FILM COATED, EXTENDED RELEASE ORAL at 09:50

## 2022-07-24 RX ADMIN — ATORVASTATIN CALCIUM 80 MG: 20 TABLET, FILM COATED ORAL at 21:52

## 2022-07-24 RX ADMIN — DOCUSATE SODIUM 100 MG: 100 CAPSULE, LIQUID FILLED ORAL at 08:05

## 2022-07-24 RX ADMIN — MAGNESIUM SULFATE HEPTAHYDRATE 2 G: 40 INJECTION, SOLUTION INTRAVENOUS at 10:01

## 2022-07-24 RX ADMIN — SODIUM CHLORIDE, SODIUM LACTATE, CALCIUM CHLORIDE, MAGNESIUM CHLORIDE AND DEXTROSE 2000 ML: 2.5; 538; 448; 18.3; 5.08 INJECTION, SOLUTION INTRAPERITONEAL at 15:33

## 2022-07-24 RX ADMIN — ISOSORBIDE MONONITRATE 60 MG: 60 TABLET, EXTENDED RELEASE ORAL at 05:44

## 2022-07-24 RX ADMIN — HYDROMORPHONE HYDROCHLORIDE 1 MG: 1 INJECTION, SOLUTION INTRAMUSCULAR; INTRAVENOUS; SUBCUTANEOUS at 02:35

## 2022-07-24 RX ADMIN — GENTAMICIN SULFATE: 1 CREAM TOPICAL at 11:24

## 2022-07-24 RX ADMIN — ONDANSETRON 4 MG: 4 TABLET, ORALLY DISINTEGRATING ORAL at 20:41

## 2022-07-24 RX ADMIN — SUCRALFATE 1 G: 1 TABLET ORAL at 11:31

## 2022-07-24 RX ADMIN — ONDANSETRON 4 MG: 4 TABLET, ORALLY DISINTEGRATING ORAL at 05:36

## 2022-07-24 NOTE — DIALYSIS
Peritoneal Dialysis Initiation / [phone]     Orders   Therapy Time: 10 hrs (per 's orders, allow another 4 hrs for last fill to dwell, then manually drain pt)   Cycles: 4   Fill Volume: 2800 ml   Last Fill Volume: 1000 ml   Total Volume: 12,200 ml   Solution: 3x 2.5% Dextrose Dianeal bags      Access   Type & Location: RLQ abd hernández pd catheter   Comments:   Access site no s/sx of infection. Access site care performed per hospital P/P - dressing already changed dated 07/24/22. Transfer set scrubbed w/ alcavis for 1 minute before connecting. Cycler primed and tested w/ 2.5% Dianeal solution    Dsg change date: 07/24/22                                 Labs   HBsAg (Antigen) / date: Pending                                              HBsAb (Antibody) / date: Pending   Source: Epic     Safety:   Time Out Done:   (Time) 1950   Consent obtained/signed: Verified   Education: Access / Site Care   Primary Nurse Rpt Pre: C. Georgana Blizzard, RN   Primary Nurse Rpt Post: C. Georgana Blizzard, RN     Comments:   Allan Preston RN at bedside to initiate CCPD tx for the night. Pt orders, notes, labs, code status reviewed. Remained present during initial drain followed by initiation of first fill. Prior to departure, bed in lowest position, call bell and personal belongings within reach. Lines visible, secure, and connections fastened well. Education and pre/post report given to primary RN. Allan Preston RN able to collect PD fluid sample from initial drain (manual drain performed earlier in day, allowed to dwell 4 hrs) - ordered cell count and culture sent to lab.     Start time: 2000 07/24/22  Estimated End Time:  1000 07/25/22

## 2022-07-24 NOTE — PROGRESS NOTES
[facility] Dosing Services: 7/24/2022    Consult for Warfarin Dosing by Pharmacy by Dr. Chavo Castro provided for this 61 y.o.  female , for indication of h/o DVT/PE. Day of Therapy continuation - current home dose 1 mg MWF  2.5 mg TTSS  Dose to achieve an INR goal of 2-3  Admission gastroenteritis - reviewed INR hx recently supra therapeutic  Hold Warfarin today for INR 3.6    Significant drug interactions: allopurinol  Previous dose given 1 mg   PT/INR Lab Results   Component Value Date/Time    INR 3.6 (H) 07/24/2022 05:42 AM      Platelets Lab Results   Component Value Date/Time    PLATELET 646 87/45/8397 05:42 AM      H/H Lab Results   Component Value Date/Time    HGB 7.6 (L) 07/24/2022 05:42 AM        Pharmacy to follow daily and will provide subsequent Warfarin dosing based on clinical status.   Kareem Du)  Contact information [phone]

## 2022-07-24 NOTE — PROGRESS NOTES
Nephrology    Consult received - ESRD on PD, abdominal pain, nausea, vomiting, hypokalemia    - PD orders placed, Pamela Arce nurse notified  - Will check cell count/fluid culture given report of abdominal pain.  No fever or cloudy effluent, low suspicion for peritonitis  - Hypokalemia - being repleted  - Will be rounded on later today    --Kaitlin Levy NP  [facility]

## 2022-07-24 NOTE — PROGRESS NOTES
DICTATED  DOUBT PD CATH ASSOC PERITONITIS  RENAL MASSES ON CT?      - PD ORDERED   - GENT TO EXIT SITE  - SEND FLUID STUDIES STAT CALL IF WBC> 100  - ALDO    NOT SURE OF STATUS OF RENAL MASS EVAL/F/U??

## 2022-07-24 NOTE — PROGRESS NOTES
7/24/2022  Reason for Admission:   Hypokalemia               RUR Score:     20% red/high risk for readmission      PCP: First and Last name:  Jay Jerome,    Name of Practice:   Are you a current patient: Yes/No:  Yes   Approximate date of last visit:  Within the past month   Can you do a virtual visit with your PCP:              Resources/supports as identified by patient/family:   Patient's  is supportive                Top Challenges facing patient (as identified by patient/family and CM): Finances/Medication cost?      Patient has insurance coverage               Transportation? Patient does not drive but  does              Support system or lack thereof? Family support                     Living arrangements? Lives w            Self-care/ADLs/Cognition? Patent can typically do self care, ADLs. Good cognition          Current Advanced Directive/Advance Care Plan:  Full Code    Healthcare Decision Maker:   Click here to complete HealthCare Decision Makers including selection of the Healthcare Decision Maker Relationship (ie \"Primary\")     Yeny Sue is [employer]    Payor Source Payor: Charito Mcnamara / Plan: [place] MEDICARE PART A & B / Product Type: Medicare /                             Plan for utilizing home health:    Patient does have history of [facility] in 2019                 Transition of Care Plan:                Patient lives with her , only has to navigate 2 steps at home. Prior to admission she is independent with ADLs. She does not drive, though her  does. She has a cane, a rollator, shower chair, and potty chair at home. Patient had home health back in 2019. Her  Yeny Sue is her emergency contact and can be reached at [phone]. He will be driving her home at discharge. Patient sees Dr Jennifer Michaels for primary care and last had a visit within the past month. She uses [facility] and prescriptions are typically covered.  No identified needs at this time. Will continue to follow. Transition of Care Plan   Continue medical management/treatment  Home with family assistance unless otherwise indicated   will transport at discharge  Follow up outpatient as indicated  CM will continue to follow    Care Management Interventions  PCP Verified by CM: Yes (Dr Cameron Moon)  Mode of Transport at Discharge:  Other (see comment) ()  Transition of Care Consult (CM Consult): Discharge Planning  Physical Therapy Consult: No  Occupational Therapy Consult: No  Speech Therapy Consult: No  Support Systems: Spouse/Significant Other  Confirm Follow Up Transport: Family  Discharge Location  Patient Expects to be Discharged to[de-identified] Home with family assistance    CHANDA Salazar

## 2022-07-24 NOTE — DIALYSIS
Peritoneal Dialysis Initiation / [phone]     Orders   Therapy Time: 4 hrs   Cycles: Manual Exchange   Fill Volume: 2000 ml   Last Fill Volume: N/A   Total Volume: 2000 ml   Solution: 2.5% Dextrose Dianeal bags      Access   Type & Location: [facility]   Comments: Prior to connection, one-minute Alcavis scrub performed. Dsg change date:  7/24/22                                 Labs   HBsAg (Antigen) / date: Pending 7/24/22    HBsAb (Antibody) / date: Pending 7/24/22   Source: [facility]     Safety:   Time Out Done:   (Time) 1515   Consent obtained/signed: Verified   Education: Signs of Peritonitis   Primary Nurse Rpt Pre: Brad Wilson RN   Primary Nurse Rpt Post: Brad Wilson RN     Comments:   Pt orders, notes, labs, code status reviewed. Start time: 66 91 21 7/24/22  Estimated End Time: 1940 7/24/22    Manual exchange completed without issue. Patient reports difficultly with filling this past Friday and drains being slower over the past week. Prior to departure, bed in lowest position, call bell and personal belongings within reach.

## 2022-07-24 NOTE — CONSULTS
[address]     Name:  Nerissa Todd  MR#:  [id number]  :  1955  ACCOUNT #:  [de-identified]  DATE OF SERVICE:  2022      RENAL CONSULT    REASON FOR CONSULTATION:  ESRD. HISTORY OF PRESENT ILLNESS:  This is a 62-year-old [facility] female with the following medical problems:  1. End-stage renal disease, on peritoneal dialysis (followed by Dr. Shamar Casey). 2.  Status post left upper extremity arteriovenous fistula. 3.  Coronary artery disease. 4.  Diabetes mellitus. 5.  GERD. 6.  Hypertension. 7.  Sleep apnea. 8.  Pulmonary hypertension. 9.  History of DVT (on chronic anticoagulant therapy). 10.  Gout. 11.  Chronic hypoxemia. I was asked to see the patient regarding end-stage renal disease. This is a 62[facility], who is followed by my partner, Dr. Wendy Machado. She is currently treated with cyclic peritoneal dialysis. Her prescription is 4 times 2800 exchanges, a long dwell of 1000 mL and then midday dwell of 2000 mL. She presented to the hospital with complaints of abdominal pain. Apparently, she had been having some cough, developed some chest pain as a result of the cough. She subsequently developed abdominal pain, primarily left sided (her peritoneal dialysis catheter exits on the right side). She has had rather poor oral intake. She had emesis, but no hematemesis. She has no increase in her diarrheal frequency over baseline. Denied any fevers; she tells me that she has chronic chills. When last exchanged, she tells me that her peritoneal dialysis effluent was clear. CURRENT MEDICATIONS:  Of note include:  1. Allopurinol. 2.  Atorvastatin. 3.  Carvedilol 6.25 mg b.i.d.  4.  Ismo 60 mg daily. 7.  Potassium chloride (single dose). 8.  Carafate. 9.  Warfarin. PHYSICAL EXAMINATION:  GENERAL:  Pleasant elderly appearing  female in bed.   VITAL SIGNS:  Most recent blood pressure 135/72, pulse is 83, temperature 98.1, oxygen saturation 97%, and respiratory rate 18. HEAD:  Head is normocephalic. Eyes show extraocular movements to be intact. The sclerae appear anicteric. NECK:  Supple. LUNGS:  Clear to auscultation. CARDIOVASCULAR:  No pericardial friction rub. ABDOMEN:  Reveals a peritoneal dialysis catheter exiting right of midline. The PD catheter exit site is nontender, bandaged. EXTREMITIES:  Show no thigh edema. SKIN:  Warm to touch. NEUROLOGIC:  She is awake and alert, answers questions appropriately. LABORATORY DATA:  Labs of note; white blood cell count 12,000, hematocrit is 24.9, platelet count 074,284. Sodium 141, potassium 2.9, chloride and bicarb are 104 and 26 with a BUN and creatinine of 42 and 13.4. Calcium is 8. Magnesium 1.1. She had a CT scan of the abdomen and pelvis \"ascites,\" most likely peritoneal dialysis fluid, interstitial lung disease, coronary artery disease, and aortic calcification, nonobstructing bilateral kidney stones, and bilateral renal masses not significantly changed. ASSESSMENT:  1. Renal masses - I am not sure where this evaluation stands, may need to pursue further if not already being followed for this by Urology. 2.  End-stage renal disease - Peritoneal dialysis orders already written by my PA, I have adjusted slightly. 3.  Abdominal pain - Her pain is primarily left-sided, not diffuse. Not reproducible at the peritoneal dialysis catheter or exit site, arguing against peritonitis. The studies had been ordered already; I have asked that these be sent stat and myself to be called in the event that the peritoneal fluid white blood cell count greater than 100. Gentamicin exit site. Erythropoietin. Thanks for the consult.       MD REGINO [email or web address]/[email or web address]  D:  07/24/2022 10:29  T:  07/24/2022 14:47  JOB #:  [id number]

## 2022-07-24 NOTE — PROGRESS NOTES
0700 Bedside shift change report given to [address] (oncoming nurse) by Bouchra Moreira (offgoing nurse). Report included the following information SBAR, Kardex, ED Summary, Intake/Output, MAR, and Recent Results. This patient was assisted with Intentional Toileting every 2 hours during this shift as appropriate. Documentation of ambulation and output reflected on Flowsheet as appropriate. Purposeful hourly rounding was completed using AIDET and 5Ps. Outcomes of PHR documented as they occurred. Bed alarm in use as appropriate. Dual Suction and ambubag in place. 46 Pt concerned about her dialudid being d/c. FP made aware of concerns, no new orders given at this time. 80 Pt agreed to take percocet, however still awaiting to speak with  About [facility]. 1930   Bedside shift change report given to [address] [facility] (oncoming nurse) by Anaid Dunlap RN (offgoing nurse). Report included the following information SBAR, Kardex, ED Summary, Intake/Output, MAR, and Recent Results.

## 2022-07-24 NOTE — ED NOTES
Report given to Ambrose Ricks RN. Patient updated on plan of care.  Patient remains alert and no acute distress

## 2022-07-24 NOTE — H&P
[facility] [address] [place] [address]   Office [phone]  Fax [phone]       Admission H&P     Name: Alexandr Bellamy MRN: [id number]  Sex: Female   YOB: 1955  Age: 61 y.o. PCP: Lobito Gallegos DO     Date of admission: 7/23/2022    Source of Information: patient, medical records    Chief complaint: Abdominal Pain    HPI:  Alexandr Bellamy is a 61 y.o. female with PMHx of CAD, HFpEF, T2DM, ESRD on PD, GERD, HTN, JASEN on CPAP, pHTN, DVT/PE on Warfarin, Gout, ILD, Chronic Hypoxia on 2L O2 who presents to the ED complaining of Abdominal Pain. Abdominal pain started two days ago and has worsened, has not been able to eat much. She says that whatever she tries to eat does not stay down, she does not have any blood or bile in her vomit. She does not have any increased diarrhea from her baseline. She does not have any fevers/chills/sweats. She says that she skipped PD yesterday, but did not notice any cloudy appearance to her PD fluid from Friday when her pain began.       In the ED:  Vitals: T 99.9  HR 79  /74  RR 20  O2Sats 99% on 2L  Labs: K 2.7  WBC 13.5  Lipase 17  Trop <0.04  Imaging: CT Abd/Pel w/ increased abdominal/pelvic ascites overall small to moderate given PD, chronic ILD, severe coronary artery/aortic calcifications, bilateral non-obstructing renal stones/mass (not significantly changed)  Treatment: Dilaudid 1mg  Zofran 4mg  KCl 40mEq PO    EKG: NSR, non-specific ST changes, unchanged from previous, non-ischemic    Patient Vitals for the past 12 hrs:   Temp Pulse Resp BP SpO2   07/24/22 0424 -- 73 -- -- --   07/24/22 0346 98.3 °F (36.8 °C) 74 16 (!) 163/74 100 %   07/24/22 0245 97.4 °F (36.3 °C) 77 16 (!) 143/61 100 %   07/24/22 0200 -- -- -- (!) 142/63 100 %   07/24/22 0000 -- -- -- (!) 134/59 100 %   07/23/22 2202 -- -- -- (!) 142/65 100 %   07/23/22 2038 -- -- -- -- 100 %   07/23/22 2032 98.9 °F (37.2 °C) 77 16 (!) 168/78 100 %   07/23/22 1855 -- -- -- Zeina Wood 150/61 100 %   07/23/22 1840 -- -- -- (!) 145/61 100 %   07/23/22 1825 -- -- -- (!) 158/71 100 %   07/23/22 1810 -- -- -- Candida Larsen 140/69 --       Review of Systems  Review of Systems   Constitutional:  Negative for activity change and fever. HENT:  Positive for postnasal drip and sinus pain. Respiratory:  Positive for cough. Negative for shortness of breath. Cardiovascular:  Negative for chest pain and palpitations. Gastrointestinal:  Positive for abdominal pain, nausea and vomiting. Negative for diarrhea. Genitourinary:  Negative for dysuria, frequency and urgency. Musculoskeletal:  Negative for back pain and myalgias. Skin:  Negative for pallor and rash. Neurological:  Negative for light-headedness and headaches. Hematological:  Negative for adenopathy. Does not bruise/bleed easily. Psychiatric/Behavioral:  Negative for confusion and dysphoric mood. Home Medications   Prior to Admission medications    Medication Sig Start Date End Date Taking? Authorizing Provider   carvediloL (COREG) 6.25 mg tablet Take 1 Tablet by mouth two (2) times daily (with meals). 7/6/22  Yes Mine Villeda PA-C   warfarin (COUMADIN) 1 mg tablet Take 2 mg every other day- alternated with 2.5 mg tablet 6/30/22  Yes Brina Cunningham, NP   warfarin (COUMADIN) 2.5 mg tablet TAKE 1 TABLET BY MOUTH EVERY DAY 6/8/22  Yes Hallie Levine DO   ondansetron (ZOFRAN ODT) 4 mg disintegrating tablet Take 1 Tablet by mouth every eight (8) hours as needed for Nausea or Vomiting. 5/28/22  Yes Jewell Harvey,    isosorbide mononitrate ER (IMDUR) 120 mg CR tablet Take 0.5 Tablets by mouth every morning.  5/5/22  Yes Hallie Levine DO   atorvastatin (LIPITOR) 80 mg tablet TAKE 1 TABLET BY MOUTH ONCE DAILY NIGHTLY 5/5/22  Yes Hallie Levine DO   allopurinoL (ZYLOPRIM) 100 mg tablet Take 1 tablet by mouth once daily 5/5/22  Yes Hallie Levine DO   oxyCODONE-acetaminophen (PERCOCET 10)  mg per tablet Take 1 Tablet by mouth every eight (8) hours as needed for Pain for up to 30 days. Max Daily Amount: 3 Tablets. 7/6/22 8/5/22 Yes Hallie Levine,    CALCITRIOL PO Take 0.5 mg by mouth. Yes Provider, Historical   sucralfate (CARAFATE) 1 gram tablet Take 1 tablet by mouth 4 times daily 11/12/21  Yes Hallie Levine DO   docusate sodium (STOOL SOFTENER PO) Take  by mouth daily. Yes Provider, Historical   nitroglycerin (NITROSTAT) 0.4 mg SL tablet 1 Tab by SubLINGual route every five (5) minutes as needed for Chest Pain. Up to 3 doses. 5/21/20  Yes Jewels Snyder NP   albuterol (PROAIR HFA) 90 mcg/actuation inhaler Take 2 Puffs by inhalation every four (4) hours as needed for Wheezing. 11/14/19  Yes Hallie Levine DO   Oxygen O2 2L NC 24/7   Yes Provider, Historical   dicyclomine (BENTYL) 10 mg capsule Take 1 Capsule by mouth daily as needed for Abdominal Cramps, Cramping or Pain. Patient not taking: Reported on 7/23/2022 6/1/22 7/24/22  Danni Murphy MD       Allergies  Allergies   Allergen Reactions    Contrast Dye [Iodine] Anaphylaxis     Tolerates when pre medicated w/ IV solumedrol and benadryl prior to procedure     Shellfish Derived Anaphylaxis    Levaquin [Levofloxacin] Nausea and Vomiting    Morphine Hives and Itching    Nsaids (Non-Steroidal Anti-Inflammatory Drug) Nausea and Vomiting       Past Medical History  Past Medical History:   Diagnosis Date     Sleep Apnea 2/16/2011    Compliant with c-pap.     Aortic aneurysm (HCC)     Abdominal    CAD (coronary Artery Disease) Native Artery 2/16/2011    Chronic kidney disease     Hemodiaylsis  3x/week    Chronic pain     CKD (chronic kidney disease) stage 4, GFR 15-29 ml/min ([facility]) 2/10/2017    Diabetic gastroparesis (HCC)     Diastolic heart failure ([address] 10/5/2012    DM type 2 causing neurological disease ([address]     DM type 2 causing renal disease (HCC)     Insulin SS at night only PRN    DM type 2 causing vascular disease ([address]     Esophageal stricture 2012    dialted Dr. Onesimo Ariza G6PD deficiency     trait    GERD (gastroesophageal reflux disease)     Gout     Heart failure ([facility]     Hemodialysis access, AV graft ([facility] 04/02/2017    Dialysis MWF    [facility] [address] Hypertension     ILD (interstitial lung disease) ([facility]     open lung bx CJW 2010    Infected dental caries 3/17/2020    Infection of total right knee replacement ([facility] 3/17/2020    Loss of appetite 3/17/2020    Morbid obesity ([facility]     OA (osteoarthritis)     Ovarian cancer ([facility]     cervical and uterine    Rheumatoid arteritis (HCC)     S/P left pulmonary artery pressure sensor implant placement 8/31/2017    Cardiomems     Thromboembolus ([facility]     Right calf     Tobacco use disorder 3/21/2012    Uterine cervix cancer ([facility]     Vitamin D deficiency 8/9/2013       Previous Hospitalization(s)  Past Surgical History:   Procedure Laterality Date    COLONOSCOPY N/A 6/24/2016    COLONOSCOPY performed by Mechelle Alonso MD at [facility]      bilateral    HX CHOLECYSTECTOMY      HX HEART CATHETERIZATION      x 7    HX HERNIA REPAIR      HX HYSTERECTOMY      HX ORTHOPAEDIC Right 11/12/12    right knee replacement    HX ORTHOPAEDIC Bilateral     carpal tunnel repair    HX ORTHOPAEDIC Right     PLATE IN ANKLE DUE TO FRACTURE    HX SALPINGO-OOPHORECTOMY      HX TONSIL AND ADENOIDECTOMY      HX TONSILLECTOMY      HX VASCULAR ACCESS Left     Left lower arm AV fistula    IR INSERT PERITONEAL VENOUS SHUNT      MD CARDIAC SURG PROCEDURE UNLIST  02/2019    x4 with last sttent placed 2/2019.     MD CHEST SURGERY PROCEDURE UNLISTED Right     > 20 years ago  RLL removed     UPPER GI ENDOSCOPY,VINOD W GUIDE  6/24/2016            Family History  Family History   Problem Relation Age of Onset    Heart Disease Mother     No Known Problems Daughter     No Known Problems Son     No Known Problems Son     Anesth Problems Neg Hx        Social History  Alcohol history: Not at all  Smoking history: Non-smoker  Illicit drug history: Not at all  Living arrangement: patient lives with their spouse. Ambulates: With cane    Vital Signs  Visit Vitals  BP (!) 163/74 (BP 1 Location: Right upper arm, BP Patient Position: At rest)   Pulse 73   Temp 98.3 °F (36.8 °C)   Resp 16   Ht 5' 10\" (1.778 m)   Wt 277 lb (125.6 kg)   SpO2 100%   BMI 39.75 kg/m²       Physical Exam  General: No acute distress. Alert. Cooperative. Head: Normocephalic. Atraumatic. Eyes:              Conjunctiva pink. Sclera white. Ears:              Hearing grossly intact. Nose:             Septum midline. Mucosa pink. No drainage. Throat: Mucosa pink. Moist mucous membranes. No tonsillar exudates or erythema. Palate movement equal bilaterally. Neck: Supple. Normal ROM. No stiffness. Respiratory: CTAB. No w/r/r/c.   Cardiovascular: RRR. Normal S1,S2. No m/r/g. Pulses 2+ throughout. GI: + bowel sounds. Tenderness to moderate palpation throughout. No rebound tenderness or guarding. Nondistended. Extremities: No LE edema. Distal pulses intact. Musculoskeletal: Full ROM in all extremities. Skin: Warm, dry. No rashes. Neuro: CN II-XII grossly intact. Laboratory Data  Recent Results (from the past 8 hour(s))   GLUCOSE, POC    Collection Time: 07/24/22  2:43 AM   Result Value Ref Range    Glucose (POC) 106 65 - 117 mg/dL    Performed by Chase [id number]  CXR Results  (Last 48 hours)      None          CT Results  (Last 48 hours)                 07/23/22 1532  CT ABD PELV WO CONT Final result    Impression:      1. Increasing abdominal and pelvic ascites, overall small to moderate volume, in   the setting of a right lower quadrant intact peritoneal dialysis catheter. 2. Evidence of chronic interstitial lung disease. 3. Severe coronary artery and aortic calcific atherosclerosis. 4. Bilateral nonobstructing renal stones and bilateral renal masses, not   significantly changed. Narrative:  EXAM: CT ABD PELV WO CONT       INDICATION: abdominal pain       COMPARISON: CT May 28, 2022       IV CONTRAST: None. ORAL CONTRAST: Not given       TECHNIQUE:    Thin axial images were obtained through the abdomen and pelvis. Coronal and   sagittal reformats were generated. CT dose reduction was achieved through use of   a standardized protocol tailored for this examination and automatic exposure   control for dose modulation. The absence of intravenous contrast material reduces the sensitivity for   evaluation of the vasculature and solid organs. FINDINGS:    LOWER THORAX: Severe calcific coronary artery calcifications. Cardiomegaly. Chronic interstitial lung disease with areas of bronchiectasis noted in both   lower lobes. LIVER: New small volume perihepatic ascites. BILIARY TREE: Status post cholecystectomy. CBD is not dilated. SPLEEN: Calcified splenic granulomas   PANCREAS: No focal abnormality. ADRENALS: Unremarkable. KIDNEYS/URETERS: Multiple bilateral renal stones, as well as chronic bilateral   renal masses which cannot be completely characterized in the absence of contrast   material. No hydronephrosis. The largest mass is in the midpole of the right   kidney measuring 6 x 5.7 cm and cannot be characterized as a simple cyst.   STOMACH: Unremarkable. SMALL BOWEL: No dilatation or wall thickening. COLON: No dilatation or wall thickening. APPENDIX: Not visualized   PERITONEUM: Perihepatic ascites has developed since the prior study. Right lower   quadrant peritoneal dialysis catheter is intact. There is moderate ascites in   the lower abdomen and pelvis, which is increased since the prior study. RETROPERITONEUM: No lymphadenopathy or aortic aneurysm. Severe diffuse calcific   aortic atherosclerosis   REPRODUCTIVE ORGANS: Status post hysterectomy   URINARY BLADDER: No mass or calculus. BONES: No destructive bone lesion. ABDOMINAL WALL: No mass or hernia. ADDITIONAL COMMENTS: N/A                     Assessment and Plan   Tejal Giang is a 61 y.o. female with a PMHx of CAD, HFpEF, T2DM, ESRD on PD, GERD, HTN, JASEN on CPAP, pHTN, DVT/PE on Warfarin, Gout, ILD, Chronic Hypoxia on 2L O2 who is admitted for Hypokalemia.     Hypokalemia: POA K 2.7, s/p KCl 40mEq PO in ED, likely due to nausea/vomiting, EKG largely unchanged from previously  - Admit to Telemetry  - Vitals per unit protocol  - Discussed with Nephrology on call, recommended cautious repletion given ESRD, if not making much urine would not be able to filter K well since PD does not filter K as well as HD  - Will give KCl 40mEq PO Now    Abdominal Pain: CT Abd/Pel without acute process to explain pain, possibly related to viral gastroenteritis but possibly related to peritonitis developed due to PD, discussed with Nephrology on call, will hold off treatment for PD at this time given patient is not having overt symptoms of peritonitis  - Obtain Fluid Analysis and Fluid Culture off of PD catheter fluid  - Supportive care for viral gastroenteritis  - Zofran for Nausea    ESRD on PD:  - Nephrology consulted, appreciate recommendations  - Avoid Nephrotoxins  - Renally Dose Medications  - Continue Calcitriol 0.5mg daily    CAD: s/p PCI RCA in 2019, f/w Dr. Jung Christian  - Continue Coreg 6.25mg BID, Imdur ER 60mg daily    HFpEF: last Echo (12/2021) w/ EF 60-65%, normal LV function, low normal RV function, f/w Dr. Jung Christian  - Monitor for signs/symptoms of HF    T2DM: last HbA1c (3/2022) 4.7, no home medications, POA glucose 87  - Monitor on daily labs    ILD: on 2L O2 at home  - Albuterol PRN    Gout: stable  - Allopurinol 100mg daily    HLD: last Lipid Panel (3/2019) Tot 139 HDL 56 LDL 67 TG 80  - Obtain Lipid Panel  - Continue Atorvastatin 80mg daily    GERD: stable  - Continue Carafate 1g QID    H/o DVT/PE: on Warfarin  - Pharmacy to dose Warfarin    Chronic Pain: stable  - Continue Percocet 10mg TID PRN    Obesity-  - The pt's Body mass index is 39.75 kg/m². - Encouraging lifestyle modifications and further follow up outpatient. FEN/GI - Diabetic diet. Activity - Ambulate with assistance  DVT prophylaxis - Warfarin  GI prophylaxis - Not indicated at this time  Fall prophylaxis - Not indicated at this time. Disposition - Admit to Telemetry. Code Status - Full. Discussed with patient / caregivers. Point of [address],  [place] - [phone]    Patient Radha Overcast will be discussed with Dr. Horacio Aguilar.     2:39 AM, 07/24/22  Leonard Lo MD  Family Medicine Resident       For Billing    Chief Complaint   Patient presents with    Abdominal Pain    Cough       Hospital Problems  Date Reviewed: 6/30/2022            Codes Class Noted POA    * (Principal) Hypokalemia ICD-10-CM: E87.6  ICD-9-CM: 276.8  7/23/2022 Yes        Chest pain ICD-10-CM: R07.9  ICD-9-CM: 786.50  7/23/2022 Yes        Abdominal pain ICD-10-CM: R10.9  ICD-9-CM: 789.00  7/23/2022 Yes        Ascites ICD-10-CM: R18.8  ICD-9-CM: 789.59  7/23/2022 Yes        (HFpEF) heart failure with preserved ejection fraction ([facility] ICD-10-CM: I50.30  ICD-9-CM: 428.9  9/23/2018 Yes        ESRD on peritoneal dialysis [facility]) ICD-10-CM: N18.6, Z99.2  ICD-9-CM: 585.6, V45.11  6/23/2018 Yes        Hx of deep venous thrombosis ICD-10-CM: Z86.718  ICD-9-CM: V12.51  5/30/2018 Yes        GERD (gastroesophageal reflux disease) ICD-10-CM: K21.9  ICD-9-CM: 530.81  Unknown Yes        Gout ICD-10-CM: M10.9  ICD-9-CM: 274.9  Unknown Yes        ILD (interstitial lung disease) ([facility] ICD-10-CM: J84.9  ICD-9-CM: 687  8/20/2017 Yes        Warfarin anticoagulation ICD-10-CM: Z79.01  ICD-9-CM: V58.61  8/20/2017 Yes        DM (diabetes mellitus), type 2 with renal complications ([facility] (Chronic) ICD-10-CM: E11.29  ICD-9-CM: 250.40  8/9/2013 Yes        HTN (hypertension) (Chronic) ICD-10-CM: I10  ICD-9-CM: 401.9  10/1/2012 Yes        Morbid obesity (Chronic) ICD-10-CM: E66.01  ICD-9-CM: 278.01  10/1/2012 Yes        Pulmonary hypertension ([address] (Chronic) ICD-10-CM: I27.20  ICD-9-CM: 416.8  3/21/2012 Yes        Coronary artery disease (Chronic) ICD-10-CM: I25.10  ICD-9-CM: 414.00  2/16/2011 Yes    Overview Addendum 10/5/2012  7:33 AM by Colonel Yap, [address] 2004  1v CAD by cath - PCI OM with SAL  Cath 5/2004 - patent stent, no new disease  Lexiscan cardiolite 12/09- normal perfusion, EF 66%  Cath: 3/19/12: PA 55/30 mean 41, wedge 26, RA 24, EDP 35, LVG 55%, LM normal, LAD normal, LCX - OM1 patent stent, OM2 prox 85% long, % with L to R collaterals                JASEN on CPAP (Chronic) ICD-10-CM: G47.33, Z99.89  ICD-9-CM: 327.23, V46.8  2/16/2011 Yes    Overview Signed 3/21/2012  8:04 AM by Jose Perez Govern CPAP

## 2022-07-25 ENCOUNTER — APPOINTMENT (OUTPATIENT)
Dept: ULTRASOUND IMAGING | Age: 67
DRG: 640 | End: 2022-07-25
Attending: STUDENT IN AN ORGANIZED HEALTH CARE EDUCATION/TRAINING PROGRAM
Payer: MEDICARE

## 2022-07-25 LAB
ALBUMIN SERPL-MCNC: 2 G/DL (ref 3.5–5)
ALBUMIN/GLOB SERPL: 0.5 {RATIO} (ref 1.1–2.2)
ALP SERPL-CCNC: 80 U/L (ref 45–117)
ALT SERPL-CCNC: 7 U/L (ref 12–78)
ANION GAP SERPL CALC-SCNC: 7 MMOL/L (ref 5–15)
AST SERPL-CCNC: 12 U/L (ref 15–37)
ATRIAL RATE: 76 BPM
BASOPHILS # BLD: 0 K/UL (ref 0–0.1)
BASOPHILS NFR BLD: 0 % (ref 0–1)
BILIRUB SERPL-MCNC: 0.5 MG/DL (ref 0.2–1)
BUN SERPL-MCNC: 41 MG/DL (ref 6–20)
BUN/CREAT SERPL: 3 (ref 12–20)
CALCIUM SERPL-MCNC: 8.3 MG/DL (ref 8.5–10.1)
CALCULATED P AXIS, ECG09: 48 DEGREES
CALCULATED R AXIS, ECG10: 13 DEGREES
CALCULATED T AXIS, ECG11: 77 DEGREES
CHLORIDE SERPL-SCNC: 105 MMOL/L (ref 97–108)
CO2 SERPL-SCNC: 27 MMOL/L (ref 21–32)
CREAT SERPL-MCNC: 12.3 MG/DL (ref 0.55–1.02)
DIAGNOSIS, 93000: NORMAL
DIFFERENTIAL METHOD BLD: ABNORMAL
EOSINOPHIL # BLD: 0.5 K/UL (ref 0–0.4)
EOSINOPHIL NFR BLD: 4 % (ref 0–7)
ERYTHROCYTE [DISTWIDTH] IN BLOOD BY AUTOMATED COUNT: 14.9 % (ref 11.5–14.5)
GLOBULIN SER CALC-MCNC: 4.1 G/DL (ref 2–4)
GLUCOSE SERPL-MCNC: 117 MG/DL (ref 65–100)
HBV SURFACE AB SER QL: NONREACTIVE
HBV SURFACE AB SER-ACNC: <3.1 MIU/ML
HBV SURFACE AG SER QL: <0.1 INDEX
HBV SURFACE AG SER QL: NEGATIVE
HCT VFR BLD AUTO: 24.5 % (ref 35–47)
HGB BLD-MCNC: 7.5 G/DL (ref 11.5–16)
IMM GRANULOCYTES # BLD AUTO: 0.1 K/UL (ref 0–0.04)
IMM GRANULOCYTES NFR BLD AUTO: 1 % (ref 0–0.5)
INR PPP: 2 (ref 0.9–1.1)
LYMPHOCYTES # BLD: 2 K/UL (ref 0.8–3.5)
LYMPHOCYTES NFR BLD: 17 % (ref 12–49)
MAGNESIUM SERPL-MCNC: 1.4 MG/DL (ref 1.6–2.4)
MCH RBC QN AUTO: 31.8 PG (ref 26–34)
MCHC RBC AUTO-ENTMCNC: 30.6 G/DL (ref 30–36.5)
MCV RBC AUTO: 103.8 FL (ref 80–99)
MONOCYTES # BLD: 1 K/UL (ref 0–1)
MONOCYTES NFR BLD: 8 % (ref 5–13)
NEUTS SEG # BLD: 8.3 K/UL (ref 1.8–8)
NEUTS SEG NFR BLD: 70 % (ref 32–75)
NRBC # BLD: 0 K/UL (ref 0–0.01)
NRBC BLD-RTO: 0 PER 100 WBC
P-R INTERVAL, ECG05: 202 MS
PERIPHERAL SMEAR,PSM: NORMAL
PLATELET # BLD AUTO: 272 K/UL (ref 150–400)
PMV BLD AUTO: 11 FL (ref 8.9–12.9)
POTASSIUM SERPL-SCNC: 3.3 MMOL/L (ref 3.5–5.1)
PROT SERPL-MCNC: 6.1 G/DL (ref 6.4–8.2)
PROTHROMBIN TIME: 19.6 SEC (ref 9–11.1)
Q-T INTERVAL, ECG07: 418 MS
QRS DURATION, ECG06: 90 MS
QTC CALCULATION (BEZET), ECG08: 470 MS
RBC # BLD AUTO: 2.36 M/UL (ref 3.8–5.2)
SODIUM SERPL-SCNC: 139 MMOL/L (ref 136–145)
VENTRICULAR RATE, ECG03: 76 BPM
WBC # BLD AUTO: 11.9 K/UL (ref 3.6–11)

## 2022-07-25 PROCEDURE — 87340 HEPATITIS B SURFACE AG IA: CPT

## 2022-07-25 PROCEDURE — 85025 COMPLETE CBC W/AUTO DIFF WBC: CPT

## 2022-07-25 PROCEDURE — A4726 DIALYS SOL FLD VOL > 5999CC: HCPCS | Performed by: NURSE PRACTITIONER

## 2022-07-25 PROCEDURE — 74011250637 HC RX REV CODE- 250/637: Performed by: STUDENT IN AN ORGANIZED HEALTH CARE EDUCATION/TRAINING PROGRAM

## 2022-07-25 PROCEDURE — 99232 SBSQ HOSP IP/OBS MODERATE 35: CPT | Performed by: FAMILY MEDICINE

## 2022-07-25 PROCEDURE — 80053 COMPREHEN METABOLIC PANEL: CPT

## 2022-07-25 PROCEDURE — 77010033678 HC OXYGEN DAILY

## 2022-07-25 PROCEDURE — 97161 PT EVAL LOW COMPLEX 20 MIN: CPT

## 2022-07-25 PROCEDURE — 97116 GAIT TRAINING THERAPY: CPT

## 2022-07-25 PROCEDURE — A4722 DIALYS SOL FLD VOL > 1999CC: HCPCS | Performed by: INTERNAL MEDICINE

## 2022-07-25 PROCEDURE — 36415 COLL VENOUS BLD VENIPUNCTURE: CPT

## 2022-07-25 PROCEDURE — 83735 ASSAY OF MAGNESIUM: CPT

## 2022-07-25 PROCEDURE — 74011250636 HC RX REV CODE- 250/636: Performed by: INTERNAL MEDICINE

## 2022-07-25 PROCEDURE — 74011250636 HC RX REV CODE- 250/636: Performed by: STUDENT IN AN ORGANIZED HEALTH CARE EDUCATION/TRAINING PROGRAM

## 2022-07-25 PROCEDURE — 86706 HEP B SURFACE ANTIBODY: CPT

## 2022-07-25 PROCEDURE — 5A1D70Z PERFORMANCE OF URINARY FILTRATION, INTERMITTENT, LESS THAN 6 HOURS PER DAY: ICD-10-PCS | Performed by: INTERNAL MEDICINE

## 2022-07-25 PROCEDURE — 90945 DIALYSIS ONE EVALUATION: CPT

## 2022-07-25 PROCEDURE — 85610 PROTHROMBIN TIME: CPT

## 2022-07-25 PROCEDURE — 65270000046 HC RM TELEMETRY

## 2022-07-25 PROCEDURE — 76770 US EXAM ABDO BACK WALL COMP: CPT

## 2022-07-25 PROCEDURE — 74011000250 HC RX REV CODE- 250: Performed by: STUDENT IN AN ORGANIZED HEALTH CARE EDUCATION/TRAINING PROGRAM

## 2022-07-25 PROCEDURE — 74011250636 HC RX REV CODE- 250/636: Performed by: NURSE PRACTITIONER

## 2022-07-25 PROCEDURE — 94660 CPAP INITIATION&MGMT: CPT

## 2022-07-25 RX ORDER — BISACODYL 5 MG
5 TABLET, DELAYED RELEASE (ENTERIC COATED) ORAL DAILY PRN
Status: DISCONTINUED | OUTPATIENT
Start: 2022-07-25 | End: 2022-07-26 | Stop reason: HOSPADM

## 2022-07-25 RX ORDER — GENTAMICIN SULFATE 1 MG/G
CREAM TOPICAL DAILY
Status: DISCONTINUED | OUTPATIENT
Start: 2022-07-25 | End: 2022-07-26 | Stop reason: HOSPADM

## 2022-07-25 RX ORDER — FOLIC ACID 1 MG/1
1 TABLET ORAL DAILY
Status: DISCONTINUED | OUTPATIENT
Start: 2022-07-25 | End: 2022-07-26 | Stop reason: HOSPADM

## 2022-07-25 RX ORDER — WARFARIN 2.5 MG/1
2.5 TABLET ORAL ONCE
Status: COMPLETED | OUTPATIENT
Start: 2022-07-25 | End: 2022-07-25

## 2022-07-25 RX ORDER — POTASSIUM CHLORIDE 750 MG/1
40 TABLET, FILM COATED, EXTENDED RELEASE ORAL
Status: COMPLETED | OUTPATIENT
Start: 2022-07-25 | End: 2022-07-25

## 2022-07-25 RX ORDER — MAGNESIUM SULFATE HEPTAHYDRATE 40 MG/ML
2 INJECTION, SOLUTION INTRAVENOUS ONCE
Status: COMPLETED | OUTPATIENT
Start: 2022-07-25 | End: 2022-07-25

## 2022-07-25 RX ORDER — SENNOSIDES 8.6 MG/1
1 TABLET ORAL DAILY
Status: DISCONTINUED | OUTPATIENT
Start: 2022-07-25 | End: 2022-07-26 | Stop reason: HOSPADM

## 2022-07-25 RX ADMIN — SUCRALFATE 1 G: 1 TABLET ORAL at 06:59

## 2022-07-25 RX ADMIN — FOLIC ACID 1 MG: 1 TABLET ORAL at 09:02

## 2022-07-25 RX ADMIN — GUAIFENESIN 600 MG: 600 TABLET ORAL at 09:02

## 2022-07-25 RX ADMIN — POTASSIUM CHLORIDE 40 MEQ: 750 TABLET, FILM COATED, EXTENDED RELEASE ORAL at 06:24

## 2022-07-25 RX ADMIN — ALLOPURINOL 100 MG: 100 TABLET ORAL at 09:02

## 2022-07-25 RX ADMIN — SUCRALFATE 1 G: 1 TABLET ORAL at 16:49

## 2022-07-25 RX ADMIN — OXYCODONE AND ACETAMINOPHEN 1 TABLET: 10; 325 TABLET ORAL at 11:26

## 2022-07-25 RX ADMIN — SODIUM CHLORIDE, SODIUM LACTATE, CALCIUM CHLORIDE, MAGNESIUM CHLORIDE AND DEXTROSE 6000 ML: 2.5; 538; 448; 18.3; 5.08 INJECTION, SOLUTION INTRAPERITONEAL at 18:40

## 2022-07-25 RX ADMIN — SENNOSIDES 8.6 MG: 8.6 TABLET, FILM COATED ORAL at 09:02

## 2022-07-25 RX ADMIN — WARFARIN SODIUM 2.5 MG: 2.5 TABLET ORAL at 14:38

## 2022-07-25 RX ADMIN — SODIUM CHLORIDE, PRESERVATIVE FREE 10 ML: 5 INJECTION INTRAVENOUS at 06:24

## 2022-07-25 RX ADMIN — ATORVASTATIN CALCIUM 80 MG: 20 TABLET, FILM COATED ORAL at 21:19

## 2022-07-25 RX ADMIN — SODIUM CHLORIDE, PRESERVATIVE FREE 10 ML: 5 INJECTION INTRAVENOUS at 21:19

## 2022-07-25 RX ADMIN — GENTAMICIN SULFATE: 1 CREAM TOPICAL at 19:49

## 2022-07-25 RX ADMIN — CARVEDILOL 6.25 MG: 6.25 TABLET, FILM COATED ORAL at 16:49

## 2022-07-25 RX ADMIN — SUCRALFATE 1 G: 1 TABLET ORAL at 11:26

## 2022-07-25 RX ADMIN — GUAIFENESIN 600 MG: 600 TABLET ORAL at 21:19

## 2022-07-25 RX ADMIN — ONDANSETRON 4 MG: 4 TABLET, ORALLY DISINTEGRATING ORAL at 16:57

## 2022-07-25 RX ADMIN — GENTAMICIN SULFATE: 1 CREAM TOPICAL at 18:39

## 2022-07-25 RX ADMIN — SODIUM CHLORIDE, SODIUM LACTATE, CALCIUM CHLORIDE, MAGNESIUM CHLORIDE AND DEXTROSE 2000 ML: 2.5; 538; 448; 18.3; 5.08 INJECTION, SOLUTION INTRAPERITONEAL at 13:58

## 2022-07-25 RX ADMIN — SODIUM CHLORIDE, SODIUM LACTATE, CALCIUM CHLORIDE, MAGNESIUM CHLORIDE AND DEXTROSE 6000 ML: 2.5; 538; 448; 18.3; 5.08 INJECTION, SOLUTION INTRAPERITONEAL at 18:39

## 2022-07-25 RX ADMIN — ISOSORBIDE MONONITRATE 60 MG: 60 TABLET, EXTENDED RELEASE ORAL at 06:24

## 2022-07-25 RX ADMIN — SODIUM CHLORIDE, PRESERVATIVE FREE 10 ML: 5 INJECTION INTRAVENOUS at 14:32

## 2022-07-25 RX ADMIN — CARVEDILOL 6.25 MG: 6.25 TABLET, FILM COATED ORAL at 09:02

## 2022-07-25 RX ADMIN — OXYCODONE AND ACETAMINOPHEN 1 TABLET: 10; 325 TABLET ORAL at 19:48

## 2022-07-25 RX ADMIN — SUCRALFATE 1 G: 1 TABLET ORAL at 21:19

## 2022-07-25 RX ADMIN — MAGNESIUM SULFATE HEPTAHYDRATE 2 G: 40 INJECTION, SOLUTION INTRAVENOUS at 06:23

## 2022-07-25 RX ADMIN — DOCUSATE SODIUM 100 MG: 100 CAPSULE, LIQUID FILLED ORAL at 09:02

## 2022-07-25 NOTE — PROGRESS NOTES
1930 Bedside shift change report given to Vinicio (oncoming nurse) by Pretty Huitron RN (offgoing nurse). Report included the following information SBAR, Intake/Output, MAR, Recent Results, and Cardiac Rhythm NSR .      0240 Unable to obtain AM labs will get charge nurse to try.     0300 Charge nurse also unable to obtain labs. 0700 Bedside shift change report [facility] to [facility] by Vinicio (offgoing nurse). Report included the following information SBAR, Intake/Output, MAR, Recent Results, and Cardiac Rhythm NSR . This patient was assisted with Intentional Toileting every 2 hours during this shift as appropriate. Documentation of ambulation and output reflected on Flowsheet as appropriate. Purposeful hourly rounding was completed using AIDET and 5Ps. Outcomes of PHR documented as they occurred. Bed alarm in use as appropriate. Dual Suction and ambubag in place.

## 2022-07-25 NOTE — PROGRESS NOTES
0720 Bedside and Verbal shift change report given to Kerin Mortimer and Jade EVANS (oncoming nurse) by Jeff Falcon RN (offgoing nurse). Report included the following information SBAR and Kardex. This patient was assisted with Intentional Toileting every 2 hours during this shift as appropriate. Documentation of ambulation and output reflected on Flowsheet as appropriate. Purposeful hourly rounding was completed using AIDET and 5Ps. Outcomes of PHR documented as they occurred. Bed alarm in use as appropriate. Dual Suction and ambubag in place. 9569 called to Valerie Walton, spoke with Jenna EVNAS, patient to dwell for 4 hours then drain, will not unhook until after 10 am.    9400 Dr Kristy Fenton called will hold noon lab since patient refusing, ordering abdominal ultrasound and urology consult. 7216 Patient nauseated and requesting IV pain medication. Family practice resident notified and did not want to order IV pain medication at this time. Wants patient to try zofran at this time.

## 2022-07-25 NOTE — PROGRESS NOTES
[facility] Dosing Services: 7/24/2022    Consult for Warfarin Dosing by Pharmacy by Dr. Bartolo Boyle provided for this 61 y.o.  female , for indication of h/o DVT/PE. Day of Therapy continuation - current home dose 1 mg MWF  2.5 mg TTSS  Dose to achieve an INR goal of 2-3    Ordered Warfarin 2.5 mg PO today. Significant drug interactions: allopurinol PTA  Previous dose given 7/24 held INR 3.6   PT/INR Lab Results   Component Value Date/Time    INR 2.0 (H) 07/25/2022 02:46 AM      Platelets Lab Results   Component Value Date/Time    PLATELET 769 07/44/6917 02:46 AM      H/H Lab Results   Component Value Date/Time    HGB 7.5 (L) 07/25/2022 02:46 AM        Pharmacy to follow daily and will provide subsequent Warfarin dosing based on clinical status.   Kareem Du)  Contact information [phone]

## 2022-07-25 NOTE — PROGRESS NOTES
[address]  YOB: 1955          Assessment & Plan:     ESRD on PD  Abd pain ?cause  Hypokalemia    Rec: Jacob PD well  PD fluid NOT c/w peritonitis  Replete K  Supportive care       Subjective:   CC: follow up ESRD  HPI: Patient seen on PD.  Jacob well  ROS: PD fluid clear, abd pain slowly better  Current Facility-Administered Medications   Medication Dose Route Frequency    senna (SENOKOT) tablet 8.6 mg  1 Tablet Oral DAILY    bisacodyL (DULCOLAX) tablet 5 mg  5 mg Oral DAILY PRN    folic acid (FOLVITE) tablet 1 mg  1 mg Oral DAILY    sodium chloride (NS) flush 5-40 mL  5-40 mL IntraVENous Q8H    sodium chloride (NS) flush 5-40 mL  5-40 mL IntraVENous PRN    albuterol (PROVENTIL VENTOLIN) nebulizer solution 2.5 mg  2.5 mg Nebulization Q4H PRN    allopurinoL (ZYLOPRIM) tablet 100 mg  100 mg Oral DAILY    atorvastatin (LIPITOR) tablet 80 mg  80 mg Oral QHS    carvediloL (COREG) tablet 6.25 mg  6.25 mg Oral BID WITH MEALS    docusate sodium (COLACE) capsule 100 mg  100 mg Oral DAILY    isosorbide mononitrate ER (IMDUR) tablet 60 mg  60 mg Oral 7am    nitroglycerin (NITROSTAT) tablet 0.4 mg  0.4 mg SubLINGual Q5MIN PRN    ondansetron (ZOFRAN ODT) tablet 4 mg  4 mg Oral Q8H PRN    oxyCODONE-acetaminophen (PERCOCET 10)  mg per tablet 1 Tablet  1 Tablet Oral Q8H PRN    sucralfate (CARAFATE) tablet 1 g  1 g Oral AC&HS    peritoneal dialysis DEXTROSE 2.5% (2.5 mEq/L low calcium) solution 6,000 mL  6,000 mL IntraPERitoneal DAILY    peritoneal dialysis DEXTROSE 2.5% (2.5 mEq/L low calcium) solution 6,000 mL  6,000 mL IntraPERitoneal DAILY    gentamicin (GARAMYCIN) 0.1 % cream   Topical DAILY    Warfarin - Pharmacy to dose   Other Rx Dosing/Monitoring    peritoneal dialysis DEXTROSE 2.5% (2.5 mEq/L low calcium) solution 2,000 mL  2,000 mL IntraPERitoneal DAILY    guaiFENesin ER (MUCINEX) tablet 600 mg  600 mg Oral Q12H    [START ON 2022] epoetin charlie-epbx (RETACRIT) injection 20,000 Units  20,000 Units SubCUTAneous Q7D          Objective:     Vitals:  Blood pressure 127/66, pulse 78, temperature 98 °F (36.7 °C), resp. rate 18, height 5' 10\" (1.778 m), weight 130.6 kg (288 lb), SpO2 100 %. Temp (24hrs), Av.2 °F (36.8 °C), Min:97.9 °F (36.6 °C), Max:99 °F (37.2 °C)      Intake and Output:   07 -  1900  In: 50 [I.V.:50]  Out: -   No intake/output data recorded. Physical Exam:                GENERAL ASSESSMENT: NAD  HEENT: Nontraumatic   CHEST: CTA  HEART: S1S2  ABDOMEN: Soft,NT  EXTREMITY: no EDEMA  NEURO: Grossly non focal          ECG/rhythm:    Data Review      Recent Labs     22  1346   TNIPOC <0.04      No results for input(s): CPK, CKMB, TROIQ in the last 72 hours. Recent Labs     22  0246 22  0944 22  0542 22  1346     --  141 143   K 3.3*  --  2.9* 2.7*     --  104 100   CO2 27  --  26 28   BUN 41*  --  42* 36*   CREA 12.30*  --  13.40* 12.85*   *  --  92 87   MG 1.4* 1.1* 1.1*  --    CA 8.3*  --  8.0* 8.4*   ALB 2.0*  --  2.0* 3.0*   WBC 11.9*  --  12.0* 13.5*   HGB 7.5*  --  7.6* 8.1*   HCT 24.5*  --  24.9* 25.9*     --  264 274      Recent Labs     22  0246 22  0542   INR 2.0* 3.6*   PTP 19.6* 35.0*     Needs: urine analysis, urine sodium, protein and creatinine  Lab Results   Component Value Date/Time    Sodium,urine random 25 11/10/2014 12:40 PM    Creatinine, urine 145.29 2017 05:01 AM           : Katheryn Chase MD  2022        [facility]:  [email or web address]  [place]  office:  [address], [address] [place] [address],[address]  [place], [address] [place] [address]  Phone: [phone]  Fax :     [phone]    [place] office:  [address] [facility], [address] [place] [address]  Phone - [phone]  Fax - [phone]

## 2022-07-25 NOTE — DIALYSIS
Peritoneal Dialysis Disconnection / [phone]     Metrics   I-Drain: 1660 ml   Total UF: 1391 ml   Last Fill: 1000 ml   Last Manual Drain: 1024 ml   Net UF:         3075 ml   Avg Dwell Time: 1:51   Lost Dwell Time: N/A   Alarms: None   Effluent: Clear, Yellow     Access   Type & Location: Mid abdomen,    Comments: Arrived to pt room to disconnect pt but pt had already disconnected herself & performed a manual drain at 1000 per Dr. Tamez Filler orders. Prior to disconnection, pt performed one-minute Alcavis scrub performed, followed by one minute soak per policy. Sterile mini cap applied and secured to abdomen. Dsg clean, dry and intact. Dsg date: 7/24/22                                         Safety:   Primary Nurse Rpt Pre: RAYSHAWN Glover RN   Primary Nurse Rpt Post: RAYSHAWN Glover RN     Comments:   Pt completed treatment with no issues. Old cassette & bags discarded in red biohazard bag. SBAR report given to primary RN.

## 2022-07-25 NOTE — PROGRESS NOTES
[address] [facility] [place]    Office [phone]  Fax [phone]     DAILY PROGRESS NOTE    24 Hour Events: NAEO. SUBJECTIVE: Generalized abdominal pain, similar to past 2 days. On PD this AM. Denies chest pain, SOB, nausea, vomiting, abdominal pain, dizziness. OBJECTIVE:      Vitals: Visit Vitals  /66 (BP 1 Location: Right upper arm, BP Patient Position: At rest)   Pulse 78   Temp 98 °F (36.7 °C)   Resp 18   Ht 5' 10\" (1.778 m)   Wt 288 lb (130.6 kg)   SpO2 100%   BMI 41.32 kg/m²     Physical Exam:  General: NAD. Respiratory: CTAB. Cardiovascular: Regular rate and rhythm. GI: Nondistended. + bowel sounds. Nontender. Mild abdominal pain in 4 quadrants with light and deep palpation. Slightly worse on L side. PD port c/d/i, no erythema. Extremities: No LE edema. Skin: Warm, dry.   Neuro: Alert and oriented    I/O:      Inpatient Medications  Current Facility-Administered Medications   Medication Dose Route Frequency    senna (SENOKOT) tablet 8.6 mg  1 Tablet Oral DAILY    bisacodyL (DULCOLAX) tablet 5 mg  5 mg Oral DAILY PRN    folic acid (FOLVITE) tablet 1 mg  1 mg Oral DAILY    sodium chloride (NS) flush 5-40 mL  5-40 mL IntraVENous Q8H    sodium chloride (NS) flush 5-40 mL  5-40 mL IntraVENous PRN    albuterol (PROVENTIL VENTOLIN) nebulizer solution 2.5 mg  2.5 mg Nebulization Q4H PRN    allopurinoL (ZYLOPRIM) tablet 100 mg  100 mg Oral DAILY    atorvastatin (LIPITOR) tablet 80 mg  80 mg Oral QHS    carvediloL (COREG) tablet 6.25 mg  6.25 mg Oral BID WITH MEALS    docusate sodium (COLACE) capsule 100 mg  100 mg Oral DAILY    isosorbide mononitrate ER (IMDUR) tablet 60 mg  60 mg Oral 7am    nitroglycerin (NITROSTAT) tablet 0.4 mg  0.4 mg SubLINGual Q5MIN PRN    ondansetron (ZOFRAN ODT) tablet 4 mg  4 mg Oral Q8H PRN    oxyCODONE-acetaminophen (PERCOCET 10)  mg per tablet 1 Tablet  1 Tablet Oral Q8H PRN    sucralfate (CARAFATE) tablet 1 g  1 g Oral AC&HS peritoneal dialysis DEXTROSE 2.5% (2.5 mEq/L low calcium) solution 6,000 mL  6,000 mL IntraPERitoneal DAILY    peritoneal dialysis DEXTROSE 2.5% (2.5 mEq/L low calcium) solution 6,000 mL  6,000 mL IntraPERitoneal DAILY    gentamicin (GARAMYCIN) 0.1 % cream   Topical DAILY    Warfarin - Pharmacy to dose   Other Rx Dosing/Monitoring    peritoneal dialysis DEXTROSE 2.5% (2.5 mEq/L low calcium) solution 2,000 mL  2,000 mL IntraPERitoneal DAILY    guaiFENesin ER (MUCINEX) tablet 600 mg  600 mg Oral Q12H    [START ON 7/28/2022] epoetin charlie-epbx (RETACRIT) injection 20,000 Units  20,000 Units SubCUTAneous Q7D       Allergies  Allergies   Allergen Reactions    Contrast Dye [Iodine] Anaphylaxis     Tolerates when pre medicated w/ IV solumedrol and benadryl prior to procedure     Shellfish Derived Anaphylaxis    Levaquin [Levofloxacin] Nausea and Vomiting    Morphine Hives and Itching    Nsaids (Non-Steroidal Anti-Inflammatory Drug) Nausea and Vomiting       CBC:  Recent Labs     07/25/22  0246 07/24/22  0542 07/23/22  1346   WBC 11.9* 12.0* 13.5*   HGB 7.5* 7.6* 8.1*   HCT 24.5* 24.9* 25.9*    264 448       Metabolic Panel:  Recent Labs     07/25/22  0246 07/24/22  0944 07/24/22  0542 07/23/22  1346     --  141 143   K 3.3*  --  2.9* 2.7*     --  104 100   CO2 27  --  26 28   BUN 41*  --  42* 36*   CREA 12.30*  --  13.40* 12.85*   *  --  92 87   CA 8.3*  --  8.0* 8.4*   MG 1.4* 1.1* 1.1*  --    ALB 2.0*  --  2.0* 3.0*   ALT 7*  --  8* <5*   INR 2.0*  --  3.6*  --             Assessment and Plan   Evette Prescott is a 61 y.o. female with a PMHx of CAD, HFpEF, T2DM, ESRD on PD, GERD, HTN, JASEN on CPAP, pHTN, DVT/PE on Warfarin, Gout, ILD, Chronic Hypoxia on 2L O2 who is admitted for Hypokalemia.      Hypokalemia: POA K 2.7, s/p KCl 40mEq PO in ED, likely due to nausea/vomiting, EKG largely unchanged from previously  - Discussed with Nephrology on call, recommended cautious repletion given ESRD, if not making much urine would not be able to filter K well since PD does not filter K as well as HD     Abdominal Pain: CT Abd/Pel without acute process to explain pain, possibly related to viral gastroenteritis but possibly related to peritonitis developed due to PD, discussed with Nephrology on call, will hold off treatment for PD at this time given patient is not having overt symptoms of peritonitis. Fluid analysis sig for RBC 12, neutrophils 10, lymphs 29, monos/macrophages 61.  - Supportive care for viral gastroenteritis  - Zofran for Nausea     ESRD on PD:  - Nephrology consulted, appreciate recommendations  - Avoid Nephrotoxins  - Renally Dose Medications  - Continue Calcitriol 0.5mg daily    Macrocytic Anemia: 7.5 on 7/25, 8.1 on admission. BL 10.5, though 8-9 throughout [id number]. Low folate to 4.1, B12 wnl.   - Started on folic acid 1 mg once daily     CAD: s/p PCI RCA in 2019, f/w Dr. Olga Araya  - Danie Gave 6.25mg BID, Imdur ER 60mg daily     HFpEF: last Echo (12/2021) w/ EF 60-65%, normal LV function, low normal RV function, f/w Dr. Olga Araya  - Monitor for signs/symptoms of HF     T2DM: last HbA1c (3/2022) 4.7, no home medications, POA glucose 87  - Monitor on daily labs     ILD: on 2L O2 at home  - Albuterol PRN     Gout: stable  - Allopurinol 100mg daily     HLD: last Lipid Panel (3/2019) Tot 139 HDL 56 LDL 67 TG 80  - Obtain Lipid Panel  - Continue Atorvastatin 80mg daily     GERD: stable  - Continue Carafate 1g QID     H/o DVT/PE: on Warfarin  - Pharmacy to dose Warfarin     Chronic Pain: stable  - Continue Percocet 10mg TID PRN     Obesity-  - The pt's Body mass index is 39.75 kg/m². - Encouraging lifestyle modifications and further follow up outpatient. FEN/GI - Diabetic diet. Activity - Ambulate with assistance  DVT prophylaxis - Warfarin  GI prophylaxis - Not indicated at this time  Fall prophylaxis - Not indicated at this time. Disposition - Admit to Telemetry. Code Status - Full. Discussed with patient / caregivers.   Point of Contact - Aliza Longo  [facility] [phone]      Duong Berrios MD  Family Medicine Resident       For Billing    Chief Complaint   Patient presents with    Abdominal Pain    Cough       Hospital Problems  Date Reviewed: 6/30/2022            Codes Class Noted POA    * (Principal) Hypokalemia ICD-10-CM: E87.6  ICD-9-CM: 276.8  7/23/2022 Yes        Chest pain ICD-10-CM: R07.9  ICD-9-CM: 786.50  7/23/2022 Yes        Abdominal pain ICD-10-CM: R10.9  ICD-9-CM: 789.00  7/23/2022 Yes        Ascites ICD-10-CM: R18.8  ICD-9-CM: 789.59  7/23/2022 Yes        (HFpEF) heart failure with preserved ejection fraction ([facility] ICD-10-CM: I50.30  ICD-9-CM: 428.9  9/23/2018 Yes        ESRD on peritoneal dialysis [facility]) ICD-10-CM: N18.6, Z99.2  ICD-9-CM: 585.6, V45.11  6/23/2018 Yes        Hx of deep venous thrombosis ICD-10-CM: Z86.718  ICD-9-CM: V12.51  5/30/2018 Yes        GERD (gastroesophageal reflux disease) ICD-10-CM: K21.9  ICD-9-CM: 530.81  Unknown Yes        Gout ICD-10-CM: M10.9  ICD-9-CM: 274.9  Unknown Yes        ILD (interstitial lung disease) ([facility] ICD-10-CM: J84.9  ICD-9-CM: 595  8/20/2017 Yes        Warfarin anticoagulation ICD-10-CM: Z79.01  ICD-9-CM: V58.61  8/20/2017 Yes        DM (diabetes mellitus), type 2 with renal complications ([facility] (Chronic) ICD-10-CM: E11.29  ICD-9-CM: 250.40  8/9/2013 Yes        HTN (hypertension) (Chronic) ICD-10-CM: I10  ICD-9-CM: 401.9  10/1/2012 Yes        Morbid obesity (Chronic) ICD-10-CM: E66.01  ICD-9-CM: 278.01  10/1/2012 Yes        Pulmonary hypertension ([facility] (Chronic) ICD-10-CM: I27.20  ICD-9-CM: 416.8  3/21/2012 Yes        Coronary artery disease (Chronic) ICD-10-CM: I25.10  ICD-9-CM: 414.00  2/16/2011 Yes    Overview Addendum 10/5/2012  7:33 AM by Hannah Chen, [address] 2004  1v CAD by cath - PCI OM with SAL  Cath 5/2004 - patent stent, no new disease  Lexiscan cardiolite 12/09- normal perfusion, EF 66%  Cath: 3/19/12: PA 55/30 mean 41, wedge 26, RA 24, EDP 35, LVG 55%, LM normal, LAD normal, LCX - OM1 patent stent, OM2 prox 85% long, % with L to R collaterals                JASEN on CPAP (Chronic) ICD-10-CM: G47.33, Z99.89  ICD-9-CM: 327.23, V46.8  2/16/2011 Yes    Overview Signed 3/21/2012  8:04 AM by Graham Rivera CPAP

## 2022-07-25 NOTE — PROGRESS NOTES
Bedside shift change report given to [address] [place] [address],[address] (oncoming nurse) by Lit EVANS (offgoing nurse). Report included the following information SBAR, Kardex, [facility], Med Rec Status, and Cardiac Rhythm NSR .     2100: retacrit due. Patient states she got it on Thursday and is uncomfortable getting it again. Will reach out to Pharmacy. 2145: instructed by pharmacy to contact on call nephrology. First attempt to reach them unsuccessful. 2230: left a message for nephrology doctor. 2255: nephrology called back. Will change orders to start a week after last dose-this Thursday. 0710: Bedside shift change report given to Jade EVANS (oncoming nurse) by Phuong Denis (offgoing nurse). Report included the following information SBAR, Kardex, Recent Results, Med Rec Status, and Cardiac Rhythm NSR .

## 2022-07-25 NOTE — PROGRESS NOTES
Transition of Care Plan: RUR-20%  Continue medical management/treatment; replete K+  GI consult   will transport home at discharge  Follow up outpatient as indicated  CM will continue to follow  JUANCHO Meyer

## 2022-07-25 NOTE — CONSULTS
New Urology Consult Note    Patient: Sharon Conklin MRN: [id number]  SSN: [id number]    YOB: 1955  Age: 61 y.o. Sex: female            Assessment:Plan:     AFVSS   Cr 12.3 (13.4)  CT abd/pelv WO :  IMPRESSION  1. Increasing abdominal and pelvic ascites, overall small to moderate volume, in  the setting of a right lower quadrant intact peritoneal dialysis catheter. 2. Evidence of chronic interstitial lung disease. 3. Severe coronary artery and aortic calcific atherosclerosis. 4. Bilateral nonobstructing renal stones and bilateral renal masses, not  significantly changed. Renal US: IMPRESSION  1. No evidence of renal mass. Bilateral renal cysts. 2.  Bilateral nonobstructing calculi seen on prior CT are not as well  appreciated sonographically. 3.  Evidence of medical renal disease. Plan:   Bilateral simple renal cysts   -Followed at  by Dr. Sri Worley, renal US 3/22 noting bilateral simple renal cysts. This has been discussed with pt in office. These do not warrant further intervention/ follow up    Thank you for this consult. Please contact [facility] with any further questions/concerns. History of Present Illness:     Chief Complaint:  without complaints     Sharon Conklin is seen in consultation for reasons noted above at the request of Coni Essex, MD.    This is a 61 y.o. female with a history of CAD, T2DM, ESRD on PD, GERD, HTN, JASEN, HTN, DVT/PE (warfarin), gout, chronic hypoxia, bilateral simple renal cysts and nephrolithiasis presented to the ER 7/23 with complaints of worsening abdominal pain with acute onset two days prior to arrival. Associated symptoms include nausea and vomiting without known aggravating or alleviating factors.  She has since been admitted with hypokalemia, ascites, abd pain, ESRD on PD. CT abd/pelv performed revealing increasing abdominal and pelvic ascites, interstitial lung disease, severe coronary artery and aortic calcific atherosclerosis, bilateral nonobstructing renal stones and concerns for bilateral renal masses. Urology consulted for renal masses. _____________________________________________________  Known pt to [facility] by Dr. Mauricio Graves. Last seen in office 3/25/22 to review renal US on 3/24/22 revealing bilateral simple renal cysts of which do not warrant further investigation. Office note from visit as below:     \" Patient is 30-year-old woman with history of ESRD on HD, anticoagulation on Coumadin, oxygen dependent who comes in today to review renal bladder ultrasound. She has a history of complex right renal cysts. She also has chronic back pain. Renal bladder ultrasound dated 3/24/2022 has been reviewed by me personally. It shows bilateral simple cysts. No evidence of solid or concerning masses. There are large bilateral kidney stones, nonobstructing. 19 mm calculus in the left lower pole and 13 mm calculus in the right midpole. No new complaints. Had dialysis today. PAST MEDICAL HISTORY:  Allergies: MORPHINE (Critical)  LEVAQUIN (LEVOFLOXACIN) (Critical)  * CONTRAST DYE (Critical)  * SHELLFISH (Moderate)  * IODINE (Moderate)  * X-RAY DYE (Moderate)  DENIES: Latex.    Medications: sucralfate 1 gram tablet (sucralfate) once a day   ergocalciferol (vitamin D2) 1,250 mcg (50,000 unit) capsule (ergocalciferol (vitamin d2)) TAKE 1 CAPSULE BY MOUTH EVERY TUESDAY  carvedilol 25 mg tablet (carvedilol) TAKE 1 & 1 2 (ONE & ONE HALF) TABLETS BY MOUTH TWICE DAILY WITH FOOD  atorvastatin 80 mg tablet (atorvastatin) TAKE 1 TABLET BY MOUTH ONCE DAILY NIGHTLY  allopurinol 100 mg tablet (allopurinol) once a day   warfarin 3 mg tablet (warfarin) as directed   oxycodone 5 mg tablet (oxycodone) 7.5 mg twice a day   nitroglycerin 0.3 mg tablet, sublingual (nitroglycerin) as needed   Norvasc 2.5 mg tablet (amlodipine) as directed   Protonix 40 mg granules DR for susp in packet (pantoprazole) as directed   Problems: Flank pain (ICD-789.09) (FIQ63-U11.9)  Complex renal cyst (ICD-753.10) (CGO93-Z64.1)  Hx of cardiac stents (ICD-V45.82) (YYN41-M51.818)  Kidney stone (ICD-592.0) (HAJ32-M48.0)  Gross hematuria (ICD-599.71) (WDL56-G26.0)  Chronic renal insufficiency (ICD-588.9) (IIE51-K07.9)  Simple Renal Cyst (ICD-593.2) (ALG91-R23.1)  Illnesses: Heart Disease, Lung Disease, Diabetes, High Blood Pressure, Bowel Problems, Kidney Problems, and Cancer. DENIES: Pacemaker/Defibrillator, Stroke/Seizure, HIV, or Hepatitis. Surgeries: Kidney Jamee Quintin Surgery,Joint Replacement Surgery,Heart Stent Procedure,Gallbladder Surgery,Hernia Repair,Hysterectomy,Laparoscopy,Ovary Removed (One),Vaginal Cancer Treatment, andColonoscopy. Family History: Kidney Disease, Uterine cancer, Cervical cancer, and Ovarian cancer. DENIES: Kidney cancer, Kidney stones, Breast cancer. 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 . Smoking status: former smoker. Does not drink alcohol. System Review: Admits to: Constipation, Difficulty Walking, Easy Bleeding, Leg Swelling, Lower Extremity Weakness, and Shortness of Breath. DENIES: Unexplained Weight Loss, Dry Eyes, Dry Mouth, Involuntary Urine Loss, Dry Skin, Psychiatric Problems, Impaired Sex Drive, Rash. EXAMINATION: Vitals: Pulse: 58 BP: 130/53 Weight: 129 lbs Height: 5' 10\" BMI: 18.58 kg/m^2    URINALYSIS  Urine Micro not done  IMPRESSION:  1. FLANK PAIN (RMA51-Z69.9) - Unchanged: LikelyI discussed that I do not feel this is likely related to her nephrolithiasis or cysts. Likely related to chronic back pain. 2. COMPLEX RENAL CYST (IPK11-B54.1) - Unchanged: Appear simple on today's renal ultrasound. No further follow-up needed. 3. KIDNEY STONE (LVL74-X28.0) - Deteriorated: Reviewed finding of large nonobstructing bilateral renal calculi. She has ESRD. Discussed management options including ureteroscopy with laser lithotripsy versus observation. Would not favor ESWL given lack of urine output. She is elected to proceed with conservative management at this time. 4. CHRONIC RENAL INSUFFICIENCY (UYZ92-F46.9) - Unchanged  PLAN: Follow-up 6 months with KUB to reassess stone burden. \"    Subjective     Past Medical History  Past Medical History:   Diagnosis Date     Sleep Apnea 2/16/2011    Compliant with c-pap. Aortic aneurysm (HCC)     Abdominal    CAD (coronary Artery Disease) Native Artery 2/16/2011    Chronic kidney disease     Hemodiaylsis  3x/week    Chronic pain     CKD (chronic kidney disease) stage 4, GFR 15-29 ml/min ([facility]) 2/10/2017    Diabetic gastroparesis ([facility])     Diastolic heart failure ([address] 10/5/2012    DM type 2 causing neurological disease ([address]     DM type 2 causing renal disease ([facility])     Insulin SS at night only PRN    DM type 2 causing vascular disease ([address]     Esophageal stricture 2012    dialted Dr. Salvador Gracia    G6PD deficiency     trait    GERD (gastroesophageal reflux disease)     Gout     Heart failure ([address]     Hemodialysis access, AV graft ([address] 04/02/2017    Dialysis MWF    [address]      Hypertension     ILD (interstitial lung disease) ([address]     open lung bx CJW 2010    Infected dental caries 3/17/2020    Infection of total right knee replacement ([address] 3/17/2020    Loss of appetite 3/17/2020    Morbid obesity ([address]     OA (osteoarthritis)     Ovarian cancer ([address]     cervical and uterine    Rheumatoid arteritis (HCC)     S/P left pulmonary artery pressure sensor implant placement 8/31/2017    Cardiomems     Thromboembolus ([address]     Right calf     Tobacco use disorder 3/21/2012    Uterine cervix cancer ([address]     Vitamin D deficiency 8/9/2013       Past Surgical History:   Past Surgical History:   Procedure Laterality Date    COLONOSCOPY N/A 6/24/2016    COLONOSCOPY performed by Manolo Winston MD at [address] [place] [address] 10    HX ADENOIDECTOMY      HX APPENDECTOMY      HX CARPAL TUNNEL RELEASE      bilateral    HX CHOLECYSTECTOMY      HX HEART CATHETERIZATION      x 7    HX HERNIA REPAIR      HX HYSTERECTOMY      HX ORTHOPAEDIC Right 11/12/12    right knee replacement    HX ORTHOPAEDIC Bilateral     carpal tunnel repair    HX ORTHOPAEDIC Right     PLATE IN ANKLE DUE TO FRACTURE    HX SALPINGO-OOPHORECTOMY      HX TONSIL AND ADENOIDECTOMY      HX TONSILLECTOMY      HX VASCULAR ACCESS Left     Left lower arm AV fistula    IR INSERT PERITONEAL VENOUS SHUNT      ID CARDIAC SURG PROCEDURE UNLIST  02/2019    x4 with last sttent placed 2/2019.     ID CHEST SURGERY PROCEDURE UNLISTED Right     > 20 years ago  RLL removed     UPPER GI ENDOSCOPY,VINOD COLON GUIDE  6/24/2016            Medication:  Current Facility-Administered Medications   Medication Dose Route Frequency Provider Last Rate Last Admin    senna (SENOKOT) tablet 8.6 mg  1 Tablet Oral DAILY Rhoda Cardona MD   8.6 mg at 07/25/22 0902    bisacodyL (DULCOLAX) tablet 5 mg  5 mg Oral DAILY PRN Rhoda Cardona MD        folic acid (FOLVITE) tablet 1 mg  1 mg Oral DAILY Rhoda Cardona MD   1 mg at 07/25/22 9048    warfarin (COUMADIN) tablet 2.5 mg  2.5 mg Oral ONCE Maria Ines Conner MD        sodium chloride (NS) flush 5-40 mL  5-40 mL IntraVENous Q8H Maria Ines Conner MD   10 mL at 07/25/22 2997    sodium chloride (NS) flush 5-40 mL  5-40 mL IntraVENous PRN Maria Ines Conner MD        albuterol (PROVENTIL VENTOLIN) nebulizer solution 2.5 mg  2.5 mg Nebulization Q4H PRN Maria Ines Conner MD        allopurinoL (ZYLOPRIM) tablet 100 mg  100 mg Oral DAILY Maria Ines Conner MD   100 mg at 07/25/22 0902    atorvastatin (LIPITOR) tablet 80 mg  80 mg Oral QHS Maria Ines Conner MD   80 mg at 07/24/22 2152    carvediloL (COREG) tablet 6.25 mg  6.25 mg Oral BID WITH MEALS Maria Ines Conner MD   6.25 mg at 07/25/22 0902    docusate sodium (COLACE) capsule 100 mg  100 mg Oral DAILY Maria Ines Conner MD   100 mg at 07/25/22 0902    isosorbide mononitrate ER (IMDUR) tablet 60 mg  60 mg Oral 7am Maria Ines Conner MD   60 mg at 07/25/22 9482 nitroglycerin (NITROSTAT) tablet 0.4 mg  0.4 mg SubLINGual Q5MIN PRN Barbara Wesley MD        ondansetron (ZOFRAN ODT) tablet 4 mg  4 mg Oral Q8H PRN Barbara Wesley MD   4 mg at 22    oxyCODONE-acetaminophen (PERCOCET 10)  mg per tablet 1 Tablet  1 Tablet Oral Q8H PRN Barbara Wesley MD   1 Tablet at 22 1126    sucralfate (CARAFATE) tablet 1 g  1 g Oral AC&HS Barbara Wesley MD   1 g at 22 1126    peritoneal dialysis DEXTROSE 2.5% (2.5 mEq/L low calcium) solution 6,000 mL  6,000 mL IntraPERitoneal DAILY Easton Garcia NP   6,000 mL at 22    peritoneal dialysis DEXTROSE 2.5% (2.5 mEq/L low calcium) solution 6,000 mL  6,000 mL IntraPERitoneal DAILY Easton Garcia NP   6,000 mL at 22    gentamicin (GARAMYCIN) 0.1 % cream   Topical DAILY Uriah Harmon MD   Given at 22 1124    Warfarin - Pharmacy to dose   Other Rx Dosing/Monitoring Barbara Wesley MD        peritoneal dialysis DEXTROSE 2.5% (2.5 mEq/L low calcium) solution 2,000 mL  2,000 mL IntraPERitoneal DAILY Uriah Harmon MD   2,000 mL at 22 1358    guaiFENesin ER (MUCINEX) tablet 600 mg  600 mg Oral Q12H Barbara Welsey MD   600 mg at 22 0902    [START ON 2022] epoetin charlie-epbx (RETACRIT) injection 20,000 Units  20,000 Units SubCUTAneous Q7D Easton Garcia NP           Allergies:   Allergies   Allergen Reactions    Contrast Dye [Iodine] Anaphylaxis     Tolerates when pre medicated w/ IV solumedrol and benadryl prior to procedure     Shellfish Derived Anaphylaxis    Levaquin [Levofloxacin] Nausea and Vomiting    Morphine Hives and Itching    Nsaids (Non-Steroidal Anti-Inflammatory Drug) Nausea and Vomiting       Social History:  Social History     Tobacco Use    Smoking status: Former     Packs/day: 0.50     Years: 41.00     Pack years: 20.50     Types: Cigarettes     Quit date: 2014     Years since quittin.7    Smokeless tobacco: Never Vaping Use    Vaping Use: Never used   Substance Use Topics    Alcohol use: No    Drug use: No       Family History  Family History   Problem Relation Age of Onset    Heart Disease Mother     No Known Problems Daughter     No Known Problems Son     No Known Problems Son     Anesth Problems Neg Hx        Review of Systems  Unchanged from admitting provider note from 7/24/22 other than HPI.     Objective:     Vital signs in last 24 hours:  Visit Vitals  /60 (BP 1 Location: Right upper arm, BP Patient Position: At rest;Lying)   Pulse 88   Temp 98 °F (36.7 °C)   Resp 18   Ht 5' 10\" (1.778 m)   Wt 130.6 kg (288 lb)   SpO2 98%   BMI 41.32 kg/m²       Intake/Output last 3 shifts:  Date 07/24/22 0700 - 07/25/22 0659 07/25/22 0700 - 07/26/22 0659   Shift 3596-5620 6687-8029 24 Hour Total 7245-9948 1322-8559 24 Hour Total   INTAKE   I.V.(mL/kg/hr)    50  50     Volume (magnesium sulfate 2 g/50 ml IVPB (premix or compounded))    50  50   Shift Total(mL/kg)    50(0.4)  50(0.4)   OUTPUT   Dialysis    1391  1391     Total UF Balance (Output) (mL)    1391  1391   Shift Total(mL/kg)    1391(10.6)  1391(10.6)   NET    -1341  -1341   Weight (kg) 125.6 130.6 130.6 130.6 130.6 130.6       Physical Exam  General Appearance: NAD, awake  HENT: atraumatic, normal ears  Cardiovascular: not tachycardic, no distress  Respiratory: no distress, NC in place   Abdomen: soft, obses  : no flank pain   Extremities: moves all  Musculoskeletal: normal alignment of neck and head  Neuro: Appropriate, no focal neurological deficits  Mood/Affect: appropriate, A&O x 3    Lab/Imaging Review:       Most Recent Labs:  Lab Results   Component Value Date/Time    WBC 11.9 (H) 07/25/2022 02:46 AM    Hemoglobin (POC) 9.9 (L) 07/21/2013 09:51 PM    HGB 7.5 (L) 07/25/2022 02:46 AM    Hematocrit (POC) 28 (L) 05/24/2019 08:18 AM    HCT 24.5 (L) 07/25/2022 02:46 AM    PLATELET 189 99/03/3907 02:46 AM    .8 (H) 07/25/2022 02:46 AM        Lab Results Component Value Date/Time    Sodium 139 07/25/2022 02:46 AM    Potassium 3.3 (L) 07/25/2022 02:46 AM    Chloride 105 07/25/2022 02:46 AM    CO2 27 07/25/2022 02:46 AM    Anion gap 7 07/25/2022 02:46 AM    Glucose 117 (H) 07/25/2022 02:46 AM    BUN 41 (H) 07/25/2022 02:46 AM    Creatinine 12.30 (H) 07/25/2022 02:46 AM    BUN/Creatinine ratio 3 (L) 07/25/2022 02:46 AM    GFR est AA 4 (L) 07/25/2022 02:46 AM    GFR est non-AA 3 (L) 07/25/2022 02:46 AM    Calcium 8.3 (L) 07/25/2022 02:46 AM    Bilirubin, total 0.5 07/25/2022 02:46 AM    Alk. phosphatase 80 07/25/2022 02:46 AM    Protein, total 6.1 (L) 07/25/2022 02:46 AM    Albumin 2.0 (L) 07/25/2022 02:46 AM    Globulin 4.1 (H) 07/25/2022 02:46 AM    A-G Ratio 0.5 (L) 07/25/2022 02:46 AM    ALT (SGPT) 7 (L) 07/25/2022 02:46 AM        No results found for: PSA, PSA2, Oneta Latin, PSAR3, [id number], [id number], 07520, PSAEXT     COAGS:    Lab Results   Component Value Date/Time    PTP 19.6 (H) 07/25/2022 02:46 AM    INR 2.0 (H) 07/25/2022 02:46 AM       Lab Results   Component Value Date/Time    Hemoglobin A1c 4.7 03/25/2022 03:07 PM    Hemoglobin A1c, External 5.0 05/19/2021 12:00 AM        Lab Results   Component Value Date/Time    CK 43 03/03/2017 02:20 PM    CK - MB <1.0 03/03/2017 02:20 PM    CK-MB Index Cannot be calulated 03/03/2017 02:20 PM    Troponin-I, Qt. <0.05 02/22/2019 04:20 AM          Urine/Blood Cultures:  Results       Procedure Component Value Units Date/Time    CULTURE, BODY FLUID [[id number]] Collected: 07/24/22 1949    Order Status: Completed Specimen: Body Fluid from Peritoneal [place] Updated: 07/25/22 1312     Special Requests: NO SPECIAL REQUESTS        GRAM STAIN OCCASIONAL WBCS SEEN         NO ORGANISMS SEEN        Culture result: PENDING    CULTURE, BLOOD, PAIRED [[id number]]     Order Status: Canceled Specimen: Blood     CULTURE, BODY FLUID Rajni Sabianism STAIN [[id number]]     Order Status: Canceled Specimen:  Body Fluid from Dialysate IMAGING:  No results found.         Signed By: Imelda Lanes, NP  - July 25, 2022

## 2022-07-25 NOTE — DIALYSIS
[address]       [phone]  [facility] Manual Exchange    Pt orders, notes, labs, code status and consent reviewed. Time out complete. Orders    Manual Drain:  0 ml   Treatment Time 4 hrs   Fill Volume 2000 ml   Dianeal 2.5% Dextrose bag      Right abdominal PD dressing CDI dated 7/24/22. Prior to connection, one minute Alcavis scrub & soak performed. PD Start Time 1420   PD End Time: 4013     Upon departure, bed in lowest position, personal belongings within reach. Education & pre/post report with Kimberly Ivan, primary RN.

## 2022-07-25 NOTE — DIALYSIS
Peritoneal Dialysis Initiation / [phone]         Orders   Therapy Time: 10 hrs (per 's orders, allow another 4 hrs for last fill to dwell, then pt will manually drain themselves + disconnect)   Cycles: 4   Fill Volume: 2800 ml   Last Fill Volume: 1000 ml   Total Volume: 12,200 ml   Solution: 3x 2.5% Dextrose Dianeal bags          Access   Type & Location: RLQ abd hernández pd catheter   Comments:   Access site no s/sx of infection. Access site care performed per hospital P/P - gentamycin dressing changed and dated 07/25/22. Transfer set scrubbed w/ alcavis for 1 minute before connecting. Cycler primed and tested w/ 2.5% Dianeal solution     Dsg change date: 07/25/22                                      Labs   HBsAg (Antigen) / date: Negative  07/25/22                                         HBsAb (Antibody) / date: Susceptible  07/25/22   Source: Epic          Safety:   Time Out Done:   (Time) 1830   Consent obtained/signed: Verified   Education: Access / Site Care   Primary Nurse Rpt Pre: RAYSHAWN Simons RN   Primary Nurse Rpt Post: ARYSHAWN Simons RN      Comments:   Ana Camacho RN at bedside to initiate [facility] tx for the night. Pt orders, notes, labs, code status reviewed. Remained present during initial drain followed by initiation of first fill. Prior to departure, bed in lowest position, call bell and personal belongings within reach. Lines visible, secure, and connections fastened well. Education and pre/post report given to primary RN. .     Start time: 1900   07/25/22  Estimated End Time:  0900 07/26/22

## 2022-07-25 NOTE — PROGRESS NOTES
1000: entered patients room to inform her when Vincent Castrejon would be coming to remove her from the PD cycler. Patient informed me she had already detached herself from cycler. 1230: Patient refusing repeat bmp/mag - called family practice and made aware.     1900: Bedside and Verbal shift change report given to Arianna Yee RN  (oncoming nurse) by [address]  (offgoing nurse). Report included the following information SBAR, Kardex, Intake/Output, MAR, and Recent Results.

## 2022-07-25 NOTE — PROGRESS NOTES
PHYSICAL THERAPY EVALUATION/DISCHARGE  Patient: Jony Watson (12 y.o. female)  Date: 7/25/2022  Primary Diagnosis: Ascites [R18.8]  Hypokalemia [E87.6]  Abdominal pain [R10.9]  Chest pain [R07.9]       Precautions:          ASSESSMENT  Based on the objective data described below, the patient presents with baseline functional status with chronic right knee pain following a prior failed TKR. She uses a cane or rollator at baseline and is distance limited d/t right knee pain. During evaluation, she is independent to modified independent for all transfers. Gait training completed x60' using a RW with good overall device safety. The patient endorses baseline functional mobility and is awaiting medical optimization before undergoing another right knee revision. Recommend mobility with nursing staff while hospitalized but no follow up PT needs identified. Further skilled acute physical therapy is not indicated at this time. PLAN :  Recommendation for discharge: (in order for the patient to meet his/her long term goals)  No skilled physical therapy/ follow up rehabilitation needs identified at this time. IF patient discharges home will need the following DME: patient owns DME required for discharge       SUBJECTIVE:   Patient stated This is what I am used to until I can have my knee surgery.     OBJECTIVE DATA SUMMARY:   HISTORY:    Past Medical History:   Diagnosis Date     Sleep Apnea 2/16/2011    Compliant with c-pap.     Aortic aneurysm (HCC)     Abdominal    CAD (coronary Artery Disease) Native Artery 2/16/2011    Chronic kidney disease     Hemodiaylsis  3x/week    Chronic pain     CKD (chronic kidney disease) stage 4, GFR 15-29 ml/min ([facility]) 2/10/2017    Diabetic gastroparesis (HCC)     Diastolic heart failure ([address] 10/5/2012    DM type 2 causing neurological disease ([address]     DM type 2 causing renal disease (HCC)     Insulin SS at night only PRN    DM type 2 causing vascular disease [facility])     Esophageal stricture 2012    dialted Dr. Radha Juarez    G6PD deficiency     trait    GERD (gastroesophageal reflux disease)     Gout     Heart failure ([facility]     Hemodialysis access, AV graft ([facility] 04/02/2017    Dialysis MWF    [facility] [address] Hypertension     ILD (interstitial lung disease) ([facility]     open lung bx CJW 2010    Infected dental caries 3/17/2020    Infection of total right knee replacement ([facility] 3/17/2020    Loss of appetite 3/17/2020    Morbid obesity ([facility]     OA (osteoarthritis)     Ovarian cancer ([facility]     cervical and uterine    Rheumatoid arteritis (HCC)     S/P left pulmonary artery pressure sensor implant placement 8/31/2017    Cardiomems     Thromboembolus ([facility]     Right calf     Tobacco use disorder 3/21/2012    Uterine cervix cancer ([facility]     Vitamin D deficiency 8/9/2013     Past Surgical History:   Procedure Laterality Date    COLONOSCOPY N/A 6/24/2016    COLONOSCOPY performed by Cici Espinoza MD at [facility].      bilateral    HX CHOLECYSTECTOMY      HX HEART CATHETERIZATION      x 7    HX HERNIA REPAIR      HX HYSTERECTOMY      HX ORTHOPAEDIC Right 11/12/12    right knee replacement    HX ORTHOPAEDIC Bilateral     carpal tunnel repair    HX ORTHOPAEDIC Right     PLATE IN ANKLE DUE TO FRACTURE    HX SALPINGO-OOPHORECTOMY      HX TONSIL AND ADENOIDECTOMY      HX TONSILLECTOMY      HX VASCULAR ACCESS Left     Left lower arm AV fistula    IR INSERT PERITONEAL VENOUS SHUNT      [place] CARDIAC SURG PROCEDURE UNLIST  02/2019    x4 with last sttent placed 2/2019.     [place] CHEST SURGERY PROCEDURE UNLISTED Right     > 20 years ago  RLL removed     UPPER GI ENDOSCOPY,VINOD PINZON  6/24/2016            Prior level of function:   Personal factors and/or comorbidities impacting plan of care:     Home Situation  Home Environment: Private residence  # Steps to Enter: 2  Rails to Enter: No  One/Two Story Residence: One story  Living Alone: No  Support Systems: Spouse/Significant Other  Patient Expects to be Discharged to[de-identified] Home with family assistance  Current DME Used/Available at Home: Raised toilet seat, Shower chair, [place], straight, Walker, rollator  Tub or Shower Type: Shower    EXAMINATION/PRESENTATION/DECISION MAKING:   Critical Behavior:  Neurologic State: Alert  Orientation Level: Oriented X4  Cognition: Appropriate for age attention/concentration, Appropriate decision making, Appropriate safety awareness, Follows commands     Hearing: Auditory  Auditory Impairment: None  Skin:    Edema:   Range Of Motion:  AROM: Generally decreased, functional (painful right knee post failed prosthesis)                       Strength:    Strength: Generally decreased, functional                    Tone & Sensation:                                  Coordination:  Coordination: Within functional limits  Vision:      Functional Mobility:  Bed Mobility:  Rolling: Independent  Supine to Sit: Modified independent     Scooting: Independent  Transfers:  Sit to Stand: Modified independent  Stand to Sit: Modified independent                       Balance:   Sitting: Intact  Standing: Intact; With support  Ambulation/Gait Training:  Distance (ft): 60 Feet (ft)  Assistive Device: Gait belt;Walker, rolling  Ambulation - Level of Assistance: Supervision     Gait Description (WDL): Exceptions to WDL  Gait Abnormalities: Decreased step clearance;Trunk sway increased        Base of Support: Widened  Stance: Right decreased  Speed/Nichole: Slow        Physical Therapy Evaluation Charge Determination   History Examination Presentation Decision-Making   MEDIUM  Complexity : 1-2 comorbidities / personal factors will impact the outcome/ POC  LOW Complexity : 1-2 Standardized tests and measures addressing body structure, function, activity limitation and / or participation in recreation  LOW Complexity : Stable, uncomplicated  LOW Complexity : FOTO score of       Based on the above components, the patient evaluation is determined to be of the following complexity level: LOW       Activity Tolerance:   Good      After treatment patient left in no apparent distress:   Supine in bed, Call bell within reach, and Caregiver / family present    COMMUNICATION/EDUCATION:   The patients plan of care was discussed with: Registered nurse. Fall prevention education was provided and the patient/caregiver indicated understanding., Patient/family have participated as able in goal setting and plan of care. , and Patient/family agree to work toward stated goals and plan of care.     Thank you for this referral.  Erin Coronado, PT, DPT   Time Calculation: 27 mins

## 2022-07-26 VITALS
DIASTOLIC BLOOD PRESSURE: 66 MMHG | TEMPERATURE: 99 F | OXYGEN SATURATION: 100 % | RESPIRATION RATE: 20 BRPM | BODY MASS INDEX: 40.66 KG/M2 | WEIGHT: 284 LBS | HEART RATE: 99 BPM | SYSTOLIC BLOOD PRESSURE: 134 MMHG | HEIGHT: 70 IN

## 2022-07-26 LAB
ALBUMIN SERPL-MCNC: 2.2 G/DL (ref 3.5–5)
ALBUMIN/GLOB SERPL: 0.5 {RATIO} (ref 1.1–2.2)
ALP SERPL-CCNC: 86 U/L (ref 45–117)
ALT SERPL-CCNC: 7 U/L (ref 12–78)
ANION GAP SERPL CALC-SCNC: 6 MMOL/L (ref 5–15)
AST SERPL-CCNC: 4 U/L (ref 15–37)
BASOPHILS # BLD: 0.1 K/UL (ref 0–0.1)
BASOPHILS NFR BLD: 0 % (ref 0–1)
BILIRUB SERPL-MCNC: 0.5 MG/DL (ref 0.2–1)
BUN SERPL-MCNC: 39 MG/DL (ref 6–20)
BUN/CREAT SERPL: 3 (ref 12–20)
CALCIUM SERPL-MCNC: 8.8 MG/DL (ref 8.5–10.1)
CHLORIDE SERPL-SCNC: 102 MMOL/L (ref 97–108)
CO2 SERPL-SCNC: 30 MMOL/L (ref 21–32)
COMMENT, HOLDF: NORMAL
CREAT SERPL-MCNC: 11.8 MG/DL (ref 0.55–1.02)
DIFFERENTIAL METHOD BLD: ABNORMAL
EOSINOPHIL # BLD: 0.7 K/UL (ref 0–0.4)
EOSINOPHIL NFR BLD: 5 % (ref 0–7)
ERYTHROCYTE [DISTWIDTH] IN BLOOD BY AUTOMATED COUNT: 14.7 % (ref 11.5–14.5)
FERRITIN SERPL-MCNC: 1162 NG/ML (ref 26–388)
GLOBULIN SER CALC-MCNC: 4.3 G/DL (ref 2–4)
GLUCOSE SERPL-MCNC: 108 MG/DL (ref 65–100)
HCT VFR BLD AUTO: 24.9 % (ref 35–47)
HGB BLD-MCNC: 7.7 G/DL (ref 11.5–16)
IMM GRANULOCYTES # BLD AUTO: 0.1 K/UL (ref 0–0.04)
IMM GRANULOCYTES NFR BLD AUTO: 1 % (ref 0–0.5)
INR PPP: 1.7 (ref 0.9–1.1)
LYMPHOCYTES # BLD: 1.9 K/UL (ref 0.8–3.5)
LYMPHOCYTES NFR BLD: 16 % (ref 12–49)
MAGNESIUM SERPL-MCNC: 1.5 MG/DL (ref 1.6–2.4)
MCH RBC QN AUTO: 32.4 PG (ref 26–34)
MCHC RBC AUTO-ENTMCNC: 30.9 G/DL (ref 30–36.5)
MCV RBC AUTO: 104.6 FL (ref 80–99)
MONOCYTES # BLD: 1 K/UL (ref 0–1)
MONOCYTES NFR BLD: 8 % (ref 5–13)
NEUTS SEG # BLD: 8.5 K/UL (ref 1.8–8)
NEUTS SEG NFR BLD: 70 % (ref 32–75)
NRBC # BLD: 0 K/UL (ref 0–0.01)
NRBC BLD-RTO: 0 PER 100 WBC
PLATELET # BLD AUTO: 298 K/UL (ref 150–400)
PMV BLD AUTO: 10.8 FL (ref 8.9–12.9)
POTASSIUM SERPL-SCNC: 3.3 MMOL/L (ref 3.5–5.1)
PROT SERPL-MCNC: 6.5 G/DL (ref 6.4–8.2)
PROTHROMBIN TIME: 17.5 SEC (ref 9–11.1)
RBC # BLD AUTO: 2.38 M/UL (ref 3.8–5.2)
SAMPLES BEING HELD,HOLD: NORMAL
SODIUM SERPL-SCNC: 138 MMOL/L (ref 136–145)
WBC # BLD AUTO: 12.2 K/UL (ref 3.6–11)

## 2022-07-26 PROCEDURE — 74011250636 HC RX REV CODE- 250/636: Performed by: STUDENT IN AN ORGANIZED HEALTH CARE EDUCATION/TRAINING PROGRAM

## 2022-07-26 PROCEDURE — 83735 ASSAY OF MAGNESIUM: CPT

## 2022-07-26 PROCEDURE — 36415 COLL VENOUS BLD VENIPUNCTURE: CPT

## 2022-07-26 PROCEDURE — 74011250637 HC RX REV CODE- 250/637: Performed by: STUDENT IN AN ORGANIZED HEALTH CARE EDUCATION/TRAINING PROGRAM

## 2022-07-26 PROCEDURE — 94660 CPAP INITIATION&MGMT: CPT

## 2022-07-26 PROCEDURE — 85025 COMPLETE CBC W/AUTO DIFF WBC: CPT

## 2022-07-26 PROCEDURE — 74011000250 HC RX REV CODE- 250: Performed by: STUDENT IN AN ORGANIZED HEALTH CARE EDUCATION/TRAINING PROGRAM

## 2022-07-26 PROCEDURE — 99238 HOSP IP/OBS DSCHRG MGMT 30/<: CPT | Performed by: FAMILY MEDICINE

## 2022-07-26 PROCEDURE — 94761 N-INVAS EAR/PLS OXIMETRY MLT: CPT

## 2022-07-26 PROCEDURE — 82728 ASSAY OF FERRITIN: CPT

## 2022-07-26 PROCEDURE — 77010033678 HC OXYGEN DAILY

## 2022-07-26 PROCEDURE — 80053 COMPREHEN METABOLIC PANEL: CPT

## 2022-07-26 PROCEDURE — 85610 PROTHROMBIN TIME: CPT

## 2022-07-26 RX ORDER — POTASSIUM CHLORIDE 20 MEQ/1
20 TABLET, EXTENDED RELEASE ORAL DAILY
Qty: 30 TABLET | Refills: 0 | Status: SHIPPED | OUTPATIENT
Start: 2022-07-26 | End: 2022-09-15 | Stop reason: SDUPTHER

## 2022-07-26 RX ORDER — FOLIC ACID 1 MG/1
1 TABLET ORAL DAILY
Qty: 30 TABLET | Refills: 0 | Status: SHIPPED | OUTPATIENT
Start: 2022-07-27 | End: 2022-09-15 | Stop reason: SDUPTHER

## 2022-07-26 RX ORDER — MAGNESIUM SULFATE HEPTAHYDRATE 40 MG/ML
2 INJECTION, SOLUTION INTRAVENOUS ONCE
Status: COMPLETED | OUTPATIENT
Start: 2022-07-26 | End: 2022-07-26

## 2022-07-26 RX ORDER — POTASSIUM CHLORIDE 750 MG/1
40 TABLET, FILM COATED, EXTENDED RELEASE ORAL
Status: COMPLETED | OUTPATIENT
Start: 2022-07-26 | End: 2022-07-26

## 2022-07-26 RX ADMIN — SUCRALFATE 1 G: 1 TABLET ORAL at 06:25

## 2022-07-26 RX ADMIN — GUAIFENESIN 600 MG: 600 TABLET ORAL at 09:11

## 2022-07-26 RX ADMIN — CARVEDILOL 6.25 MG: 6.25 TABLET, FILM COATED ORAL at 09:12

## 2022-07-26 RX ADMIN — ISOSORBIDE MONONITRATE 60 MG: 60 TABLET, EXTENDED RELEASE ORAL at 06:24

## 2022-07-26 RX ADMIN — DOCUSATE SODIUM 100 MG: 100 CAPSULE, LIQUID FILLED ORAL at 09:11

## 2022-07-26 RX ADMIN — MAGNESIUM SULFATE HEPTAHYDRATE 2 G: 40 INJECTION, SOLUTION INTRAVENOUS at 11:48

## 2022-07-26 RX ADMIN — SODIUM CHLORIDE, PRESERVATIVE FREE 10 ML: 5 INJECTION INTRAVENOUS at 06:25

## 2022-07-26 RX ADMIN — POTASSIUM CHLORIDE 40 MEQ: 750 TABLET, FILM COATED, EXTENDED RELEASE ORAL at 11:48

## 2022-07-26 RX ADMIN — SENNOSIDES 8.6 MG: 8.6 TABLET, FILM COATED ORAL at 09:12

## 2022-07-26 RX ADMIN — FOLIC ACID 1 MG: 1 TABLET ORAL at 09:11

## 2022-07-26 RX ADMIN — ALLOPURINOL 100 MG: 100 TABLET ORAL at 09:12

## 2022-07-26 RX ADMIN — SUCRALFATE 1 G: 1 TABLET ORAL at 11:48

## 2022-07-26 NOTE — DISCHARGE SUMMARY
Discharge Note     Name: Andria King MRN: [id number]  Sex: Female   YOB: 1955  Age: 61 y.o. PCP: Sher Centeno DO     Date of admission: 7/23/2022  Date of discharge: 7/26/2022    Attending physician at admission: Addison Souza. Attending physician at discharge: No att. providers found  Resident physician at discharge: Olesya Sandoval MD     Consultants during hospitalization  IP CONSULT TO [address] [facility] TO [address] [place] [address]     Admission diagnoses   Ascites [R18.8]  Hypokalemia [E87.6]  Abdominal pain [R10.9]  Chest pain [R07.9]    Recommended follow-up after discharge    1. PCP-Hallie Levine DO  2. PCP and Nephro. F/u with GI with Dr. [facility]/ Milton Ramirez [address] [place] [address], [facility] [address], Phone: [phone]     Things to follow up on with PCP:   - Recheck potassium and magnesium levels, folate levels  - Pt on warfarin, check INR and readjust dosing as needed    History of Present Illness    As per admitting provider, Dr. Vera Trujillo:     Andria King is a 61 y.o. female with PMHx of CAD, HFpEF, T2DM, ESRD on PD, GERD, HTN, JASEN on CPAP, pHTN, DVT/PE on Warfarin, Gout, ILD, Chronic Hypoxia on 2L O2 who presents to the ED complaining of Abdominal Pain. Abdominal pain started two days ago and has worsened, has not been able to eat much. She says that whatever she tries to eat does not stay down, she does not have any blood or bile in her vomit. She does not have any increased diarrhea from her baseline. She does not have any fevers/chills/sweats. She says that she skipped PD yesterday, but did not notice any cloudy appearance to her PD fluid from Friday when her pain began. [facility] course      Pt admitted for Hypokalemia and abd pain in setting of PD. Low K likely due to nausea/vomiting, EKG largely unchanged from previously. CT Abd/Pel ordered for abdominal pain which did not demonstrate any acute process to explain pain.  No concern for peritonitis with exam, neg CTAP, and reassuring ascitic fluid and cx. Pain likely 2/2 edema in setting of PD. Anemia is macrocytic concerning for folate deficiency, and patient started on folic acid. Repeat US 7/25 noted bilateral simple renal cysts, discussed previously with pt in Uro OP. Per Uro, they do not warrant further intervention/ follow up. Patient discharged on K supplements. Hypokalemia, improving. Pending morning labs, difficulty getting access with fistula. POA K 2.7, s/p KCl 40mEq PO in ED, likely due to nausea/vomiting, EKG largely unchanged from previously  - Nephro on board, appreciate recs  - Cont Klor-Con 20 mg once daily     Abdominal Pain: CT Abd/Pel without acute process to explain pain, likely related to ascites. No signs of peritonitis developed due to PD, pt has had no overt symptoms of peritonitis. Fluid analysis sig for RBC 12, neutrophils 10, lymphs 29, monos/macrophages 61. - Zofran for Nausea     ESRD on PD:  - Nephrology consulted, appreciate recommendations  - Avoid Nephrotoxins  - Renally Dose Medications  - Continue Calcitriol 0.5mg daily     Macrocytic Anemia: Likely 2/2 folate deficiency. 7.5 on 7/25, 8.1 on admission. BL 10.5, though 8-9 throughout [id number]. Low folate to 4.1, B12 wnl.  - Started on folic acid 1 mg once daily     BL Non-obstructing Renal Stones: stable. - Urology on board     Bilateral simple renal cysts: stable. Renal US 3/22 and 7/25 noting bilateral simple renal cysts. Per Urology, followed at  by Dr. Lindsey Hayden. Has been discussed with pt in office. These do not warrant further intervention/ follow up.      CAD: s/p PCI RCA in 2019, f/w Dr. Angella Duran  - Continue Coreg 6.25mg BID, Imdur ER 60mg daily     HFpEF: last Echo (12/2021) w/ EF 60-65%, normal LV function, low normal RV function, f/w Dr. Angella Duran  - Monitor for signs/symptoms of HF     T2DM: last HbA1c (3/2022) 4.7, no home medications, POA glucose 87  - Monitor on daily labs     ILD: on 2L O2 at home  - Albuterol PRN Gout: stable  - Allopurinol 100mg daily     HLD: last Lipid Panel (3/2019) Tot 139 HDL 56 LDL 67 TG 80  - Obtain Lipid Panel  - Continue Atorvastatin 80mg daily     GERD: stable  - Continue Carafate 1g QID     H/o DVT/PE: on Warfarin  - Pharmacy to dose Warfarin     Chronic Pain: stable  - Continue Percocet 10mg TID PRN     Obesity-  - The pt's Body mass index is 39.75 kg/m². - Encouraging lifestyle modifications and further follow up outpatient. Visit Vitals  /66 (BP 1 Location: Right upper arm, BP Patient Position: At rest)   Pulse 99   Temp 99 °F (37.2 °C)   Resp 20   Ht 5' 10\" (1.778 m)   Wt 284 lb (128.8 kg)   SpO2 100%   BMI 40.75 kg/m²       Physical Examination:  General: No acute distress  Head: Normocephalic, atraumatic  Eyes: Conjunctiva pink  Neck: Supple  ENT: No rhinorrhea  CV: Heart: regular rate  Resp: Lungs: clear to auscultation bilaterally  GI: + bowel sounds, non-tender to palpation, non-distended. Back: No CVA tenderness  Extremities: No lower extremity edema  Skin: Warm, dry  Neuro: Awake, alert, and oriented. Condition at discharge: Stable.     Labs  Recent Labs     07/26/22 0920 07/25/22 0246 07/24/22  0542   WBC 12.2* 11.9* 12.0*   HGB 7.7* 7.5* 7.6*   HCT 24.9* 24.5* 24.9*    272 264     Recent Labs     07/26/22 0920 07/25/22  0246 07/24/22  0944 07/24/22  0542    139  --  141   K 3.3* 3.3*  --  2.9*    105  --  104   CO2 30 27  --  26   BUN 39* 41*  --  42*   CREA 11.80* 12.30*  --  13.40*   * 117*  --  92   CA 8.8 8.3*  --  8.0*   MG 1.5* 1.4* 1.1* 1.1*     Recent Labs     07/26/22 0920 07/25/22  0246 07/24/22  0542   ALT 7* 7* 8*   AP 86 80 89   TBILI 0.5 0.5 0.4   TP 6.5 6.1* 5.9*   ALB 2.2* 2.0* 2.0*   GLOB 4.3* 4.1* 3.9     Recent Labs     07/26/22  0920 07/25/22  0246 07/24/22  0944 07/24/22  0542 07/24/22  0243   INR 1.7* 2.0*  --  3.6*  --    PTP 17.5* 19.6*  --  35.0*  --    TIBC  --   --  102*  --   --    PSAT  --   --  27  -- --    FERR 1,162*  --   --   --   --    GLUCPOC  --   --   --   --  106       Micro:  Lab Results   Component Value Date/Time    Culture result: NO GROWTH THUS FAR 07/24/2022 07:49 PM    Culture result: NO GROWTH ON SOLID MEDIA AT 14 DAYS 05/24/2019 10:00 AM    Culture result: (A) 05/24/2019 10:00 AM     STAPHYLOCOCCUS EPIDERMIDIS (OXACILLIN RESISTANT) ISOLATED FROM THIO BROTH ONLY    Culture result: (A) 05/24/2019 10:00 AM     PRELIMINARY REPORT OF GRAM POSITIVE COCCI IN CLUSTERS SEEN ON GRAM STAIN OF THE THIO BROTH RESULT CALLED TO AND READ BACK BY DIMAS EVANS ON 5/26/19 AT 1155. AOW    Culture result: Culture performed on Unspun Fluid 05/24/2019 10:00 AM    Culture result: NO ANAEROBES ISOLATED 05/24/2019 10:00 AM       Imaging:  CT ABD PELV WO CONT    Result Date: 7/23/2022  EXAM: CT ABD PELV WO CONT INDICATION: abdominal pain COMPARISON: CT May 28, 2022 IV CONTRAST: None. ORAL CONTRAST: Not given TECHNIQUE: Thin axial images were obtained through the abdomen and pelvis. Coronal and sagittal reformats were generated. CT dose reduction was achieved through use of a standardized protocol tailored for this examination and automatic exposure control for dose modulation. The absence of intravenous contrast material reduces the sensitivity for evaluation of the vasculature and solid organs. FINDINGS: LOWER THORAX: Severe calcific coronary artery calcifications. Cardiomegaly. Chronic interstitial lung disease with areas of bronchiectasis noted in both lower lobes. LIVER: New small volume perihepatic ascites. BILIARY TREE: Status post cholecystectomy. CBD is not dilated. SPLEEN: Calcified splenic granulomas PANCREAS: No focal abnormality. ADRENALS: Unremarkable. KIDNEYS/URETERS: Multiple bilateral renal stones, as well as chronic bilateral renal masses which cannot be completely characterized in the absence of contrast material. No hydronephrosis.  The largest mass is in the midpole of the right kidney measuring 6 x 5.7 cm and cannot be characterized as a simple cyst. STOMACH: Unremarkable. SMALL BOWEL: No dilatation or wall thickening. COLON: No dilatation or wall thickening. APPENDIX: Not visualized PERITONEUM: Perihepatic ascites has developed since the prior study. Right lower quadrant peritoneal dialysis catheter is intact. There is moderate ascites in the lower abdomen and pelvis, which is increased since the prior study. RETROPERITONEUM: No lymphadenopathy or aortic aneurysm. Severe diffuse calcific aortic atherosclerosis REPRODUCTIVE ORGANS: Status post hysterectomy URINARY BLADDER: No mass or calculus. BONES: No destructive bone lesion. ABDOMINAL WALL: No mass or hernia. ADDITIONAL COMMENTS: N/A     1. Increasing abdominal and pelvic ascites, overall small to moderate volume, in the setting of a right lower quadrant intact peritoneal dialysis catheter. 2. Evidence of chronic interstitial lung disease. 3. Severe coronary artery and aortic calcific atherosclerosis. 4. Bilateral nonobstructing renal stones and bilateral renal masses, not significantly changed. US RETROPERITONEUM COMP    Result Date: 7/25/2022  EXAM: US RETROPERITONEUM COMP INDICATION: Characterize bilateral renal masses COMPARISON: CT abdomen and pelvis 7/23/2021 TECHNIQUE: Ultrasound of the kidneys and urinary bladder. FINDINGS: The right and left kidneys measure 11.1 and 12.3 cm, respectively. No hydronephrosis. Bilateral nonobstructing calculi seen on prior CT are not as well appreciated sonographically. No renal mass. The kidneys are echogenic. An approximately 5.8 cm x 5.7 cm x 5.2 cm cyst in the mid right kidney. Several cysts in the left kidney measuring up to 4.7 cm x 4.7 cm x 4.7 cm in the upper pole. The corticomedullary differentiation is within normal limits. The bladder is within normal limits. Visualized aorta and IVC are within normal limits. 1.  No evidence of renal mass. Bilateral renal cysts.  2.  Bilateral nonobstructing calculi seen on prior CT are not as well appreciated sonographically. 3.  Evidence of medical renal disease.      Chronic diagnoses   Problem List as of 7/26/2022 Date Reviewed: 6/30/2022            Codes Class Noted - Resolved    * (Principal) Hypokalemia ICD-10-CM: E87.6  ICD-9-CM: 276.8  7/23/2022 - Present        Chest pain ICD-10-CM: R07.9  ICD-9-CM: 786.50  7/23/2022 - Present        Abdominal pain ICD-10-CM: R10.9  ICD-9-CM: 789.00  7/23/2022 - Present        Ascites ICD-10-CM: R18.8  ICD-9-CM: 789.59  7/23/2022 - Present        Coronary stent patent ICD-10-CM: Z95.5  ICD-9-CM: V45.82  3/17/2020 - Present        Edema of both lower extremities ICD-10-CM: R60.0  ICD-9-CM: 782.3  3/17/2020 - Present        S/P ORIF (open reduction internal fixation) fracture ICD-10-CM: Z98.890, Z87.81  ICD-9-CM: V45.89, V15.51  5/24/2019 - Present        Nephrolithiasis ICD-10-CM: N20.0  ICD-9-CM: 592.0  3/20/2019 - Present        PVC (premature ventricular contraction) ICD-10-CM: I49.3  ICD-9-CM: 427.69  3/20/2019 - Present        (HFpEF) heart failure with preserved ejection fraction ([facility] ICD-10-CM: I50.30  ICD-9-CM: 428.9  9/23/2018 - Present        ESRD on peritoneal dialysis [facility]) ICD-10-CM: N18.6, Z99.2  ICD-9-CM: 585.6, V45.11  6/23/2018 - Present        Limited mobility ICD-10-CM: Z74.09  ICD-9-CM: V49.89  6/21/2018 - Present        Hx of deep venous thrombosis ICD-10-CM: Z86.718  ICD-9-CM: V12.51  5/30/2018 - Present        Diabetic gastroparesis (HCC) ICD-10-CM: E11.43, K31.84  ICD-9-CM: 250.60, 536.3  Unknown - Present        GERD (gastroesophageal reflux disease) ICD-10-CM: K21.9  ICD-9-CM: 530.81  Unknown - Present        DM type 2 causing neurological disease ([facility] ICD-10-CM: E11.49  ICD-9-CM: 250.60  Unknown - Present        DM type 2 causing vascular disease ([facility] ICD-10-CM: E11.59  ICD-9-CM: 250.70, 443.81  Unknown - Present        Gout ICD-10-CM: M10.9  ICD-9-CM: 274.9  Unknown - Present        S/P left pulmonary artery pressure sensor implant placement ICD-10-CM: Z95.9  ICD-9-CM: V45.89  8/31/2017 - Present    Overview Signed 8/31/2017  9:53 AM by Anton Berumen RN     [employer]              ILD (interstitial lung disease) [facility]) ICD-10-CM: J84.9  ICD-9-CM: 088  8/20/2017 - Present        Warfarin anticoagulation ICD-10-CM: Z79.01  ICD-9-CM: V58.61  8/20/2017 - Present        DM (diabetes mellitus), type 2 with renal complications (HCC) (Chronic) ICD-10-CM: E11.29  ICD-9-CM: 250.40  8/9/2013 - Present        Normocytic anemia (Chronic) ICD-10-CM: D64.9  ICD-9-CM: 285.9  5/26/2013 - Present        HTN (hypertension) (Chronic) ICD-10-CM: I10  ICD-9-CM: 401.9  10/1/2012 - Present        Morbid obesity (Chronic) ICD-10-CM: E66.01  ICD-9-CM: 278.01  10/1/2012 - Present        Pulmonary hypertension ([address] (Chronic) ICD-10-CM: I27.20  ICD-9-CM: 416.8  3/21/2012 - Present        Coronary artery disease (Chronic) ICD-10-CM: I25.10  ICD-9-CM: 414.00  2/16/2011 - Present    Overview Addendum 10/5/2012  7:33 AM by Pretty Marquez, [address] [facility] 2004  1v CAD by cath - PCI OM with SAL  Cath 5/2004 - patent stent, no new disease  Lexiscan cardiolite 12/09- normal perfusion, EF 66%  Cath: 3/19/12: PA 55/30 mean 41, wedge 26, RA 24, EDP 35, LVG 55%, LM normal, LAD normal, LCX - OM1 patent stent, OM2 prox 85% long, % with L to R collaterals                JASEN on CPAP (Chronic) ICD-10-CM: G47.33, Z99.89  ICD-9-CM: 327.23, V46.8  2/16/2011 - Present    Overview Signed 3/21/2012  8:04 AM by Lynette Harper CPAP             Sleep apnea ICD-10-CM: G47.30  ICD-9-CM: 780.57  2/16/2011 - Present        RESOLVED: Arthralgia ICD-10-CM: M25.50  ICD-9-CM: 719.40  3/17/2020 - 6/30/2022        RESOLVED: Fatigue ICD-10-CM: R53.83  ICD-9-CM: 780.79  3/17/2020 - 6/30/2022        RESOLVED: Hematuria ICD-10-CM: R31.9  ICD-9-CM: 599.70  3/17/2020 - 6/30/2022        RESOLVED: Hypervolemia ICD-10-CM: E87.70  ICD-9-CM: 276.69  3/17/2020 - 6/30/2022        RESOLVED: Infected dental caries ICD-10-CM: K02.9, K04.7  ICD-9-CM: 521.09  3/17/2020 - 11/10/2020        RESOLVED: Loss of appetite ICD-10-CM: R63.0  ICD-9-CM: 783.0  3/17/2020 - 11/10/2020        RESOLVED: End stage renal failure on dialysis [facility]) ICD-10-CM: N18.6, Z99.2  ICD-9-CM: 585.6, V45.11  3/17/2020 - 6/30/2022        RESOLVED: Flank pain ICD-10-CM: R10.9  ICD-9-CM: 789.09  3/17/2020 - 6/30/2022        RESOLVED: Hypoxia ICD-10-CM: R09.02  ICD-9-CM: 799.02  3/17/2020 - 6/30/2022        RESOLVED: Nausea and vomiting ICD-10-CM: R11.2  ICD-9-CM: 787.01  3/17/2020 - 11/10/2020        RESOLVED: Infection of total right knee replacement (HCC) ICD-10-CM: T84.53XA  ICD-9-CM: 996.66, V43.65  3/17/2020 - 6/30/2022        RESOLVED: Chronic anticoagulation ICD-10-CM: Z79.01  ICD-9-CM: V58.61  12/13/2019 - 6/30/2022        RESOLVED: ESRD (end stage renal disease) ([facility] ICD-10-CM: N18.6  ICD-9-CM: 585.6  5/28/2019 - 6/30/2022        RESOLVED: Rheumatoid arteritis ([facility] ICD-10-CM: M05.20  ICD-9-CM: 714.2  Unknown - 12/18/2020        RESOLVED: DM type 2 causing renal disease ([facility] ICD-10-CM: E11.29  ICD-9-CM: 250.40  Unknown - 6/30/2022        RESOLVED: COPD (chronic obstructive pulmonary disease) ([facility] ICD-10-CM: J44.9  ICD-9-CM: 496  Unknown - 9/23/2018        RESOLVED: Chest pain ICD-10-CM: R07.9  ICD-9-CM: 786.50  11/21/2017 - 6/15/2018        RESOLVED: Allergic reaction to contrast dye ICD-10-CM: V07.7H2R  ICD-9-CM: 995.27  11/20/2017 - 5/30/2018        RESOLVED: COPD, severe ([facility] ICD-10-CM: J44.9  ICD-9-CM: 446  6/21/2017 - 2/17/2021        RESOLVED: Decreased ambulation status ICD-10-CM: Z74.09  ICD-9-CM: 780.99  6/21/2017 - 9/18/2017        RESOLVED: Acute exacerbation of chronic obstructive pulmonary disease (COPD) ([facility] ICD-10-CM: J44.1  ICD-9-CM: 491.21  3/1/2017 - 8/20/2017        RESOLVED: HCAP (healthcare-associated pneumonia) ICD-10-CM: J18.9  ICD-9-CM: 486  2/28/2017 - 8/20/2017 RESOLVED: CHF exacerbation ([facility] ICD-10-CM: I50.9  ICD-9-CM: 428.0  2/16/2017 - 3/2/2017        RESOLVED: CKD (chronic kidney disease) stage 4, GFR 15-29 ml/min (HCC) (Chronic) ICD-10-CM: N18.4  ICD-9-CM: 585.4  2/10/2017 - 6/23/2018        RESOLVED: Acute and chronic respiratory failure with hypoxia [facility]) ICD-10-CM: J96.21  ICD-9-CM: 518.84, 799.02  2/10/2017 - 8/20/2017        RESOLVED: DVT (deep venous thrombosis) ([facility] ICD-10-CM: I82.409  ICD-9-CM: 453.40  2/2/2017 - 5/30/2018        RESOLVED: Chest pain ICD-10-CM: R07.9  ICD-9-CM: 786.50  11/9/2014 - 11/18/2014        RESOLVED: Wheezing ICD-10-CM: R06.2  ICD-9-CM: 786.07  11/9/2014 - 11/18/2014        RESOLVED: Dyspnea ICD-10-CM: R06.00  ICD-9-CM: 786.09  11/9/2014 - 11/18/2014        RESOLVED: Tobacco abuse ICD-10-CM: Z72.0  ICD-9-CM: 305.1  11/9/2014 - 6/25/2015        RESOLVED: SIRS (systemic inflammatory response syndrome) ([facility] ICD-10-CM: R65.10  ICD-9-CM: 995.90  11/9/2014 - 11/18/2014        RESOLVED: Bacteremia ICD-10-CM: R78.81  ICD-9-CM: 790.7  10/14/2013 - 2/22/2014        RESOLVED: Acute pancreatitis ICD-10-CM: K85.90  ICD-9-CM: 577.0  10/10/2013 - 2/22/2014        RESOLVED: Abdominal pain ICD-10-CM: R10.9  ICD-9-CM: 789.00  10/10/2013 - 2/22/2014        RESOLVED: Pneumonia, organism unspecified(486) ICD-10-CM: J18.9  ICD-9-CM: 486  10/10/2013 - 2/22/2014        RESOLVED: Vitamin D deficiency ICD-10-CM: E55.9  ICD-9-CM: 268.9  8/9/2013 - 5/30/2018        RESOLVED: Angina, class III ([facility] ICD-10-CM: I20.9  ICD-9-CM: 413.9  8/9/2013 - 5/30/2018        RESOLVED: Acute respiratory failure ([facility] ICD-10-CM: J96.00  ICD-9-CM: 518.81  7/12/2013 - 7/17/2013        RESOLVED: Pneumonia, organism unspecified(486) ICD-10-CM: J18.9  ICD-9-CM: 486  7/12/2013 - 7/17/2013        RESOLVED: Chest pain, unspecified ICD-10-CM: R07.9  ICD-9-CM: 786.50  7/12/2013 - 7/17/2013        RESOLVED: DJD (degenerative joint disease) of knee ICD-10-CM: M17.10  ICD-9-CM: 715.36 10/30/2012 - 5/30/2018        RESOLVED: Chronic diastolic heart failure (HCC) (Chronic) ICD-10-CM: I50.32  ICD-9-CM: 428.32  10/5/2012 - 9/23/2018        RESOLVED: Diastolic heart failure (HCC) ICD-10-CM: I50.30  ICD-9-CM: 428.30  10/5/2012 - 6/23/2018        RESOLVED: Pneumonia, organism unspecified ICD-10-CM: J18.9  ICD-9-CM: 486  10/1/2012 - 10/30/2012        RESOLVED: JASEN (obstructive sleep apnea) ICD-10-CM: G47.33  ICD-9-CM: 327.23  10/1/2012 - 10/14/2016        RESOLVED: Esophageal reflux ICD-10-CM: K21.9  ICD-9-CM: 530.81  10/1/2012 - 5/24/2019        RESOLVED: Gastroparesis ICD-10-CM: K31.84  ICD-9-CM: 536.3  10/1/2012 - 6/30/2022        RESOLVED: Leukocytosis, unspecified ICD-10-CM: B44.332  ICD-9-CM: 288.60  10/1/2012 - 10/30/2012        RESOLVED: Anemia, unspecified ICD-10-CM: D64.9  ICD-9-CM: 285.9  10/1/2012 - 10/30/2012        RESOLVED: ARF (acute renal failure) ICD-10-CM: N17.9  ICD-9-CM: 584.9  10/1/2012 - 10/30/2012        RESOLVED: Pulmonary edema ICD-10-CM: J81.1  ICD-9-CM: 993  10/1/2012 - 10/30/2012        RESOLVED: Tobacco use disorder (Chronic) ICD-10-CM: F17.200  ICD-9-CM: 305.1  3/21/2012 - 6/25/2015        RESOLVED: Interstitial lung disease ([address] (Chronic) ICD-10-CM: J84.9  ICD-9-CM: 785  2/28/2011 - 9/18/2017           Discharge Medication List as of 7/26/2022  2:24 PM        START taking these medications    Details   folic acid (FOLVITE) 1 mg tablet Take 1 Tablet by mouth in the morning., Normal, Disp-30 Tablet, R-0      potassium chloride (K-DUR, KLOR-CON M20) 20 mEq tablet Take 1 Tablet by mouth in the morning., Normal, Disp-30 Tablet, R-0           CONTINUE these medications which have NOT CHANGED    Details   warfarin (COUMADIN) 1 mg tablet Take 1 mg by mouth every Monday, Wednesday, Friday., Historical Med      carvediloL (COREG) 6.25 mg tablet Take 1 Tablet by mouth two (2) times daily (with meals). , Normal, Disp-60 Tablet, R-0      ondansetron (ZOFRAN ODT) 4 mg disintegrating tablet Take 1 Tablet by mouth every eight (8) hours as needed for Nausea or Vomiting., Print, Disp-30 Tablet, R-0      isosorbide mononitrate ER (IMDUR) 120 mg CR tablet Take 0.5 Tablets by mouth every morning., Normal, Disp-45 Tablet, R-3      atorvastatin (LIPITOR) 80 mg tablet TAKE 1 TABLET BY MOUTH ONCE DAILY NIGHTLY, Normal, Disp-90 Tablet, R-3      allopurinoL (ZYLOPRIM) 100 mg tablet Take 1 tablet by mouth once daily, Normal, Disp-90 Tablet, R-3      oxyCODONE-acetaminophen (PERCOCET 10)  mg per tablet Take 1 Tablet by mouth every eight (8) hours as needed for Pain for up to 30 days. Max Daily Amount: 3 Tablets., Normal, Disp-90 Tablet, R-0      CALCITRIOL PO Take 0.5 mg by mouth., Historical Med      sucralfate (CARAFATE) 1 gram tablet Take 1 tablet by mouth 4 times daily, Normal, Disp-120 Tablet, R-5      docusate sodium (STOOL SOFTENER PO) Take  by mouth daily. , Historical Med      nitroglycerin (NITROSTAT) 0.4 mg SL tablet 1 Tab by SubLINGual route every five (5) minutes as needed for Chest Pain. Up to 3 doses. , Normal, Disp-1 Bottle, R-1      albuterol (PROAIR HFA) 90 mcg/actuation inhaler Take 2 Puffs by inhalation every four (4) hours as needed for Wheezing., Normal, Disp-1 Inhaler, R-5      Oxygen O2 2L NC 24/7, Historical Med              Diet:  Regular diet.     Activity:  As tolerated     Disposition: Home or Self Care    Discharge instructions to patient/family  Please seek medical attention for any new or worsening symptoms particularly fever, chest pain, shortness of breath, abdominal pain, nausea, vomiting    Follow up plans/appointments  Follow-up Information       Follow up With Specialties Details Why Contact Gil Ovalle DO Family Medicine Go on 7/28/2022 Hospital Follow Up at 2:45pm [address]  [facility]  [phone]               Delio Guthrie MD  Family Medicine Resident       For Billing    Chief Complaint   Patient presents with Abdominal Pain    Cough       Hospital Problems  Date Reviewed: 6/30/2022            Codes Class Noted POA    * (Principal) Hypokalemia ICD-10-CM: E87.6  ICD-9-CM: 276.8  7/23/2022 Yes        Chest pain ICD-10-CM: R07.9  ICD-9-CM: 786.50  7/23/2022 Yes        Abdominal pain ICD-10-CM: R10.9  ICD-9-CM: 789.00  7/23/2022 Yes        Ascites ICD-10-CM: R18.8  ICD-9-CM: 789.59  7/23/2022 Yes        (HFpEF) heart failure with preserved ejection fraction (HCC) ICD-10-CM: I50.30  ICD-9-CM: 428.9  9/23/2018 Yes        ESRD on peritoneal dialysis [facility]) ICD-10-CM: N18.6, Z99.2  ICD-9-CM: 585.6, V45.11  6/23/2018 Yes        Hx of deep venous thrombosis ICD-10-CM: Z86.718  ICD-9-CM: V12.51  5/30/2018 Yes        GERD (gastroesophageal reflux disease) ICD-10-CM: K21.9  ICD-9-CM: 530.81  Unknown Yes        Gout ICD-10-CM: M10.9  ICD-9-CM: 274.9  Unknown Yes        ILD (interstitial lung disease) ([facility] ICD-10-CM: J84.9  ICD-9-CM: 683  8/20/2017 Yes        Warfarin anticoagulation ICD-10-CM: Z79.01  ICD-9-CM: V58.61  8/20/2017 Yes        DM (diabetes mellitus), type 2 with renal complications (HCC) (Chronic) ICD-10-CM: E11.29  ICD-9-CM: 250.40  8/9/2013 Yes        HTN (hypertension) (Chronic) ICD-10-CM: I10  ICD-9-CM: 401.9  10/1/2012 Yes        Morbid obesity (Chronic) ICD-10-CM: E66.01  ICD-9-CM: 278.01  10/1/2012 Yes        Pulmonary hypertension ([facility] (Chronic) ICD-10-CM: I27.20  ICD-9-CM: 416.8  3/21/2012 Yes        Coronary artery disease (Chronic) ICD-10-CM: I25.10  ICD-9-CM: 414.00  2/16/2011 Yes    Overview Addendum 10/5/2012  7:33 AM by Keegan Erazo, [address] 2004  1v CAD by cath - PCI OM with SAL  Cath 5/2004 - patent stent, no new disease  Lexiscan cardiolite 12/09- normal perfusion, EF 66%  Cath: 3/19/12: PA 55/30 mean 41, wedge 26, RA 24, EDP 35, LVG 55%, LM normal, LAD normal, LCX - OM1 patent stent, OM2 prox 85% long, % with L to R collaterals                JASEN on CPAP (Chronic) ICD-10-CM: G47.33, Z99.89  ICD-9-CM: 327.23, V46.8  2/16/2011 Yes    Overview Signed 3/21/2012  8:04 AM by Daily PAIZ

## 2022-07-26 NOTE — PROGRESS NOTES
[address] [facility]  YOB: 1955          Assessment & Plan:     ESRD on PD  Abd pain ?cause. Does not correlate with PD  Hypokalemia    Rec: Jacob PD well  PD fluid NOT c/w peritonitis  Replete K  Supportive care       Subjective:   CC: follow up ESRD  HPI: PD went well last night. Has abd pain even when empty and no change in pain with filling or draining.  Fluid remains clear  ROS: PD fluid clear, no osb  Current Facility-Administered Medications   Medication Dose Route Frequency    potassium chloride SR (KLOR-CON 10) tablet 40 mEq  40 mEq Oral NOW    senna (SENOKOT) tablet 8.6 mg  1 Tablet Oral DAILY    bisacodyL (DULCOLAX) tablet 5 mg  5 mg Oral DAILY PRN    folic acid (FOLVITE) tablet 1 mg  1 mg Oral DAILY    gentamicin (GARAMYCIN) 0.1 % cream   Topical DAILY    peritoneal dialysis DEXTROSE 2.5% (2.5 mEq/L low calcium) solution 6,000 mL  6,000 mL IntraPERitoneal DAILY    sodium chloride (NS) flush 5-40 mL  5-40 mL IntraVENous Q8H    sodium chloride (NS) flush 5-40 mL  5-40 mL IntraVENous PRN    albuterol (PROVENTIL VENTOLIN) nebulizer solution 2.5 mg  2.5 mg Nebulization Q4H PRN    allopurinoL (ZYLOPRIM) tablet 100 mg  100 mg Oral DAILY    atorvastatin (LIPITOR) tablet 80 mg  80 mg Oral QHS    carvediloL (COREG) tablet 6.25 mg  6.25 mg Oral BID WITH MEALS    docusate sodium (COLACE) capsule 100 mg  100 mg Oral DAILY    isosorbide mononitrate ER (IMDUR) tablet 60 mg  60 mg Oral 7am    nitroglycerin (NITROSTAT) tablet 0.4 mg  0.4 mg SubLINGual Q5MIN PRN    ondansetron (ZOFRAN ODT) tablet 4 mg  4 mg Oral Q8H PRN    oxyCODONE-acetaminophen (PERCOCET 10)  mg per tablet 1 Tablet  1 Tablet Oral Q8H PRN    sucralfate (CARAFATE) tablet 1 g  1 g Oral AC&HS    peritoneal dialysis DEXTROSE 2.5% (2.5 mEq/L low calcium) solution 6,000 mL  6,000 mL IntraPERitoneal DAILY    peritoneal dialysis DEXTROSE 2.5% (2.5 mEq/L low calcium) solution 6,000 mL  6,000 mL IntraPERitoneal DAILY    Warfarin - Pharmacy to dose   Other Rx Dosing/Monitoring    peritoneal dialysis DEXTROSE 2.5% (2.5 mEq/L low calcium) solution 2,000 mL  2,000 mL IntraPERitoneal DAILY    guaiFENesin ER (MUCINEX) tablet 600 mg  600 mg Oral Q12H    [START ON 2022] epoetin charlie-epbx (RETACRIT) injection 20,000 Units  20,000 Units SubCUTAneous Q7D          Objective:     Vitals:  Blood pressure 139/65, pulse 80, temperature 98.9 °F (37.2 °C), resp. rate 18, height 5' 10\" (1.778 m), weight 128.8 kg (284 lb), SpO2 100 %. Temp (24hrs), Av.5 °F (36.9 °C), Min:98.2 °F (36.8 °C), Max:98.9 °F (37.2 °C)      Intake and Output:   07 -  190  In: -   Out: 528   1901 -  07  In: 290 [P.O.:240; I.V.:50]  Out: 1391     Physical Exam:               GENERAL ASSESSMENT: NAD  HEENT: Nontraumatic   CHEST: unlabored  ABDOMEN: Soft,NT  EXTREMITY: no EDEMA  NEURO: Grossly non focal          ECG/rhythm:    Data Review      Recent Labs     22  1346   TNIPOC <0.04        No results for input(s): CPK, CKMB, TROIQ in the last 72 hours.   Recent Labs     22  0920 22  0246 22  0944 22  0542    139  --  141   K 3.3* 3.3*  --  2.9*    105  --  104   CO2 30 27  --  26   BUN 39* 41*  --  42*   CREA 11.80* 12.30*  --  13.40*   * 117*  --  92   MG 1.5* 1.4* 1.1* 1.1*   CA 8.8 8.3*  --  8.0*   ALB 2.2* 2.0*  --  2.0*   WBC 12.2* 11.9*  --  12.0*   HGB 7.7* 7.5*  --  7.6*   HCT 24.9* 24.5*  --  24.9*    272  --  264        Recent Labs     22  0920 22  0246 22  0542   INR 1.7* 2.0* 3.6*   PTP 17.5* 19.6* 35.0*       Needs: urine analysis, urine sodium, protein and creatinine  Lab Results   Component Value Date/Time    Sodium,urine random 25 11/10/2014 12:40 PM    Creatinine, urine 145.29 2017 05:01 AM           : Armida Jaquez MD  2022        [facility]:  [email or web address]  Jamaal Johnson office:  [address] [place] [address], [address] [place] [address],[address]  [place], [address] [facility]  Phone: [phone]  Fax :     [phone]    [place] office:  [address] [facility], [facility] [address]  Phone - [phone]  Fax - [phone]

## 2022-07-26 NOTE — PROGRESS NOTES
Physician Progress Note      Inocencio Huertas  CSN #:                  [id number]  :                       1955  ADMIT DATE:       2022 12:14 PM  DISCH DATE:  RESPONDING  PROVIDER #:        Jadon Rosas MD          QUERY TEXT:    Pt admitted with N/V and hypokalemia, noted to have ILD and on O2 2L at baseline. If possible, please document in the progress notes and discharge summary if you are evaluating and/or treating any of the following: The medical record reflects the following:  Risk Factors: ILD, CHF  Clinical Indicators: pt admitted with abd pain, N/V, and hypokalemia  - noted to have ILD and on 2L O2 support at home  Treatment: O2 2L NC, monitoring      Thank you,    Shad Bianchi RN  CDI  [phone]  Options provided:  -- Chronic respiratory failure with hypoxia  -- Chronic respiratory failure with hypercapnia  -- Chronic respiratory failure with hypoxia and hypercapnia  -- Other - I will add my own diagnosis  -- Disagree - Not applicable / Not valid  -- Disagree - Clinically unable to determine / Unknown  -- Refer to Clinical Documentation Reviewer    PROVIDER RESPONSE TEXT:    This patient has chronic respiratory failure with hypoxia.     Query created by: Rashida Prieto on 2022 12:13 PM      Electronically signed by:  Jadon Rosas MD 2022 1:59 PM

## 2022-07-26 NOTE — DIALYSIS
Peritoneal Dialysis Disconnection / [phone]     Metrics   I-Drain: 1921 ml   Total UF: 968 ml   Last Fill: 1000 ml + 2000 ml from CAPD   Last Manual Drain: 1352 ml   Net UF:         1241 ml   Avg Dwell Time: 1:50   Lost Dwell Time: 0:08   Alarms: None   Effluent: Clear, Yellow     Access   Type & Location: Mid abdomen,    Comments: Arrived to pt room to disconnect pt but pt had already disconnected herself & performed a manual drain at 0800, only had 3 hr dwell time for last fill instead of 4 hr dwell ordered. Prior to disconnection, pt performed one-minute Alcavis scrub performed, followed by one minute soak per policy. Sterile mini cap applied and secured to abdomen. Dsg clean, dry and intact. Dsg date: 7/25/22                                         Safety:   Primary Nurse Rpt Pre: Diego Branham RN   Primary Nurse Rpt Post: Diego Branham RN     Comments:   Pt completed treatment with no issues. Old cassette & bags discarded in red biohazard bag. SBAR report given to primary RN. Pt to be discharged home today. Machine externally disinfected per policy & procedure and returned to supply closet.

## 2022-07-26 NOTE — PROGRESS NOTES
Hospital Follow Up with PCP  Alem Rebolledo DO for 7/28/2022 at 2:45 pm. Scheduling stated that this appointment was already scheduled for the patient.

## 2022-07-26 NOTE — PROGRESS NOTES
DAILY PROGRESS NOTE    24 Hour Events: NAEO. SUBJECTIVE: Mild abdominal pain, better than past 2 days. Denies chest pain, SOB, nausea, vomiting, abdominal pain, dizziness. OBJECTIVE:      Vitals: Visit Vitals  /67 (BP 1 Location: Right upper arm, BP Patient Position: At rest)   Pulse 77   Temp 98.2 °F (36.8 °C)   Resp 17   Ht 5' 10\" (1.778 m)   Wt 284 lb (128.8 kg)   SpO2 100%   BMI 40.75 kg/m²     Physical Exam:  General: NAD. Respiratory: CTAB. Cardiovascular: Regular rate and rhythm. GI: Nondistended. + bowel sounds. Nontender. Mild abdominal pain in 4 quadrants with light and deep palpation. PD port c/d/i, no erythema. Extremities: No LE edema. Skin: Warm, dry. Neuro: Alert and oriented    I/O:  Date 07/25/22 0700 - 07/26/22 0659 07/26/22 0700 - 07/27/22 0659   Shift 3171-4846 7874-2686 24 Hour Total 0220-1710 3645-1494 24 Hour Total   INTAKE   P.O. 240  240        P. O. 240  240      I. V.(mL/kg/hr) 50(0)  50        Volume (magnesium sulfate 2 g/50 ml IVPB (premix or compounded)) 50  50      Shift Total(mL/kg) 290(2.2)  290(2.3)      OUTPUT   Dialysis 1391  1391        Total UF Balance (Output) (mL) 1391  1391      Shift Total(mL/kg) 1391(10.6)  1391(10.8)      NET -1101  -1101      Weight (kg) 130.6 128.8 128.8 128.8 128.8 128.8       Inpatient Medications  Current Facility-Administered Medications   Medication Dose Route Frequency    senna (SENOKOT) tablet 8.6 mg  1 Tablet Oral DAILY    bisacodyL (DULCOLAX) tablet 5 mg  5 mg Oral DAILY PRN    folic acid (FOLVITE) tablet 1 mg  1 mg Oral DAILY    gentamicin (GARAMYCIN) 0.1 % cream   Topical DAILY    peritoneal dialysis DEXTROSE 2.5% (2.5 mEq/L low calcium) solution 6,000 mL  6,000 mL IntraPERitoneal DAILY    sodium chloride (NS) flush 5-40 mL  5-40 mL IntraVENous Q8H    sodium chloride (NS) flush 5-40 mL  5-40 mL IntraVENous PRN    albuterol (PROVENTIL VENTOLIN) nebulizer solution 2.5 mg  2.5 mg Nebulization Q4H PRN    allopurinoL (ZYLOPRIM) tablet 100 mg  100 mg Oral DAILY    atorvastatin (LIPITOR) tablet 80 mg  80 mg Oral QHS    carvediloL (COREG) tablet 6.25 mg  6.25 mg Oral BID WITH MEALS    docusate sodium (COLACE) capsule 100 mg  100 mg Oral DAILY    isosorbide mononitrate ER (IMDUR) tablet 60 mg  60 mg Oral 7am    nitroglycerin (NITROSTAT) tablet 0.4 mg  0.4 mg SubLINGual Q5MIN PRN    ondansetron (ZOFRAN ODT) tablet 4 mg  4 mg Oral Q8H PRN    oxyCODONE-acetaminophen (PERCOCET 10)  mg per tablet 1 Tablet  1 Tablet Oral Q8H PRN    sucralfate (CARAFATE) tablet 1 g  1 g Oral AC&HS    peritoneal dialysis DEXTROSE 2.5% (2.5 mEq/L low calcium) solution 6,000 mL  6,000 mL IntraPERitoneal DAILY    peritoneal dialysis DEXTROSE 2.5% (2.5 mEq/L low calcium) solution 6,000 mL  6,000 mL IntraPERitoneal DAILY    Warfarin - Pharmacy to dose   Other Rx Dosing/Monitoring    peritoneal dialysis DEXTROSE 2.5% (2.5 mEq/L low calcium) solution 2,000 mL  2,000 mL IntraPERitoneal DAILY    guaiFENesin ER (MUCINEX) tablet 600 mg  600 mg Oral Q12H    [START ON 7/28/2022] epoetin charlie-epbx (RETACRIT) injection 20,000 Units  20,000 Units SubCUTAneous Q7D       Allergies  Allergies   Allergen Reactions    Contrast Dye [Iodine] Anaphylaxis     Tolerates when pre medicated w/ IV solumedrol and benadryl prior to procedure     Shellfish Derived Anaphylaxis    Levaquin [Levofloxacin] Nausea and Vomiting    Morphine Hives and Itching    Nsaids (Non-Steroidal Anti-Inflammatory Drug) Nausea and Vomiting       CBC:  Recent Labs     07/25/22  0246 07/24/22  0542 07/23/22  1346   WBC 11.9* 12.0* 13.5*   HGB 7.5* 7.6* 8.1*   HCT 24.5* 24.9* 25.9*    264 523         Metabolic Panel:  Recent Labs     07/25/22  0246 07/24/22  0944 07/24/22  0542 07/23/22  1346     --  141 143   K 3.3*  --  2.9* 2.7*     --  104 100   CO2 27  --  26 28   BUN 41*  --  42* 36*   CREA 12.30*  --  13.40* 12.85*   *  --  92 87   CA 8.3*  --  8.0* 8.4*   MG 1.4* 1.1* 1.1* --    ALB 2.0*  --  2.0* 3.0*   ALT 7*  --  8* <5*   INR 2.0*  --  3.6*  --               Assessment and Plan   Evette Prescott is a 61 y.o. female with a PMHx of CAD, HFpEF, T2DM, ESRD on PD, GERD, HTN, JASEN on CPAP, pHTN, DVT/PE on Warfarin, Gout, ILD, Chronic Hypoxia on 2L O2 who is admitted for Hypokalemia. Hypokalemia, improving. Pending morning labs, difficulty getting access with fistula. POA K 2.7, s/p KCl 40mEq PO in ED, likely due to nausea/vomiting, EKG largely unchanged from previously  - Nephro on board, appreciate recs     Abdominal Pain: CT Abd/Pel without acute process to explain pain, likely related to ascites. No signs of peritonitis developed due to PD, pt has had no overt symptoms of peritonitis. Fluid analysis sig for RBC 12, neutrophils 10, lymphs 29, monos/macrophages 61. - Zofran for Nausea     ESRD on PD:  - Nephrology consulted, appreciate recommendations  - Avoid Nephrotoxins  - Renally Dose Medications  - Continue Calcitriol 0.5mg daily    Macrocytic Anemia: Likely 2/2 folate deficiency. 7.5 on 7/25, 8.1 on admission. BL 10.5, though 8-9 throughout 5682-6546. Low folate to 4.1, B12 wnl.   - Started on folic acid 1 mg once daily    BL Non-obstructing Renal Stones: stable. - Urology on board    Bilateral simple renal cysts: stable. Renal US 3/22 and 7/25 noting bilateral simple renal cysts. Per Urology, followed at  by Dr. Etienne Cooper. Has been discussed with pt in office. These do not warrant further intervention/ follow up.      CAD: s/p PCI RCA in 2019, f/w Dr. Mekhi Johnson  - Virgin Epley 6.25mg BID, Imdur ER 60mg daily     HFpEF: last Echo (12/2021) w/ EF 60-65%, normal LV function, low normal RV function, f/w Dr. Mekhi Johnson  - Monitor for signs/symptoms of HF     T2DM: last HbA1c (3/2022) 4.7, no home medications, POA glucose 87  - Monitor on daily labs     ILD: on 2L O2 at home  - Albuterol PRN     Gout: stable  - Allopurinol 100mg daily     HLD: last Lipid Panel (3/2019) Tot 139 HDL 56 LDL 67 TG 80  - Obtain Lipid Panel  - Continue Atorvastatin 80mg daily     GERD: stable  - Continue Carafate 1g QID     H/o DVT/PE: on Warfarin  - Pharmacy to dose Warfarin     Chronic Pain: stable  - Continue Percocet 10mg TID PRN     Obesity-  - The pt's Body mass index is 39.75 kg/m². - Encouraging lifestyle modifications and further follow up outpatient. FEN/GI - Diabetic diet. Activity - Ambulate with assistance  DVT prophylaxis - Warfarin  GI prophylaxis - Not indicated at this time  Fall prophylaxis - Not indicated at this time. Disposition - Admit to Telemetry. Code Status - Full. Discussed with patient / caregivers.   [facility] [address] [place],  [place] - [phone]      Yaw Thacker MD  Family Medicine Resident       For Billing    Chief Complaint   Patient presents with    Abdominal Pain    Cough       Hospital Problems  Date Reviewed: 6/30/2022            Codes Class Noted POA    * (Principal) Hypokalemia ICD-10-CM: E87.6  ICD-9-CM: 276.8  7/23/2022 Yes        Chest pain ICD-10-CM: R07.9  ICD-9-CM: 786.50  7/23/2022 Yes        Abdominal pain ICD-10-CM: R10.9  ICD-9-CM: 789.00  7/23/2022 Yes        Ascites ICD-10-CM: R18.8  ICD-9-CM: 789.59  7/23/2022 Yes        (HFpEF) heart failure with preserved ejection fraction ([facility] ICD-10-CM: I50.30  ICD-9-CM: 428.9  9/23/2018 Yes        ESRD on peritoneal dialysis [facility]) ICD-10-CM: N18.6, Z99.2  ICD-9-CM: 585.6, V45.11  6/23/2018 Yes        Hx of deep venous thrombosis ICD-10-CM: Z86.718  ICD-9-CM: V12.51  5/30/2018 Yes        GERD (gastroesophageal reflux disease) ICD-10-CM: K21.9  ICD-9-CM: 530.81  Unknown Yes        Gout ICD-10-CM: M10.9  ICD-9-CM: 274.9  Unknown Yes        ILD (interstitial lung disease) ([facility] ICD-10-CM: J84.9  ICD-9-CM: 453  8/20/2017 Yes        Warfarin anticoagulation ICD-10-CM: Z79.01  ICD-9-CM: V58.61  8/20/2017 Yes        DM (diabetes mellitus), type 2 with renal complications ([facility] (Chronic) ICD-10-CM: E11.29  ICD-9-CM: 250.40  8/9/2013 Yes        HTN (hypertension) (Chronic) ICD-10-CM: I10  ICD-9-CM: 401.9  10/1/2012 Yes        Morbid obesity (Chronic) ICD-10-CM: E66.01  ICD-9-CM: 278.01  10/1/2012 Yes        Pulmonary hypertension (HCC) (Chronic) ICD-10-CM: I27.20  ICD-9-CM: 416.8  3/21/2012 Yes        Coronary artery disease (Chronic) ICD-10-CM: I25.10  ICD-9-CM: 414.00  2/16/2011 Yes    Overview Addendum 10/5/2012  7:33 AM by Stoney Mcclain, [address] [facility]  1v CAD by cath - PCI OM with SAL  Cath 5/2004 - patent stent, no new disease  Lexiscan cardiolite 12/09- normal perfusion, EF 66%  Cath: 3/19/12: PA 55/30 mean 41, wedge 26, RA 24, EDP 35, LVG 55%, LM normal, LAD normal, LCX - OM1 patent stent, OM2 prox 85% long, % with L to R collaterals                JASEN on CPAP (Chronic) ICD-10-CM: G47.33, Z99.89  ICD-9-CM: 327.23, V46.8  2/16/2011 Yes    Overview Signed 3/21/2012  8:04 AM by Luis Daniel Mcgee CPAP

## 2022-07-26 NOTE — PROGRESS NOTES
DAILY PROGRESS NOTE    24 Hour Events: NAEO. SUBJECTIVE: Mild abdominal pain, better than past 2 days. Denies chest pain, SOB, nausea, vomiting, abdominal pain, dizziness. OBJECTIVE:      Vitals: Visit Vitals  /65 (BP 1 Location: Right upper arm, BP Patient Position: At rest)   Pulse 80   Temp 98.9 °F (37.2 °C)   Resp 18   Ht 5' 10\" (1.778 m)   Wt 284 lb (128.8 kg)   SpO2 100%   BMI 40.75 kg/m²     Physical Exam:  General: NAD. Respiratory: CTAB. Cardiovascular: Regular rate and rhythm. GI: Nondistended. + bowel sounds. Nontender. Mild abdominal pain in 4 quadrants with light and deep palpation. PD port c/d/i, no erythema. Extremities: No LE edema. Skin: Warm, dry. Neuro: Alert and oriented    I/O:  Date 07/25/22 0700 - 07/26/22 0659 07/26/22 0700 - 07/27/22 0659   Shift 8639-9089 4351-8713 24 Hour Total 8094-0209 7670-4771 24 Hour Total   INTAKE   P.O. 240  240        P. O. 240  240      I. V.(mL/kg/hr) 50(0)  50(0)        Volume (magnesium sulfate 2 g/50 ml IVPB (premix or compounded)) 50  50      Shift Total(mL/kg) 290(2.2)  290(2.3)      OUTPUT   Dialysis 1391  1391 968  968     Total UF Balance (Output) (mL) 1391  1391 968  968   Shift Total(mL/kg) 1391(10.6)  1391(10.8) 968(7.5)  968(7.5)   NET -1101  -1101 -968  -968   Weight (kg) 130.6 128.8 128.8 128.8 128.8 128.8         Inpatient Medications  Current Facility-Administered Medications   Medication Dose Route Frequency    senna (SENOKOT) tablet 8.6 mg  1 Tablet Oral DAILY    bisacodyL (DULCOLAX) tablet 5 mg  5 mg Oral DAILY PRN    folic acid (FOLVITE) tablet 1 mg  1 mg Oral DAILY    gentamicin (GARAMYCIN) 0.1 % cream   Topical DAILY    peritoneal dialysis DEXTROSE 2.5% (2.5 mEq/L low calcium) solution 6,000 mL  6,000 mL IntraPERitoneal DAILY    sodium chloride (NS) flush 5-40 mL  5-40 mL IntraVENous Q8H    sodium chloride (NS) flush 5-40 mL  5-40 mL IntraVENous PRN    albuterol (PROVENTIL VENTOLIN) nebulizer solution 2.5 mg  2.5 mg Nebulization Q4H PRN    allopurinoL (ZYLOPRIM) tablet 100 mg  100 mg Oral DAILY    atorvastatin (LIPITOR) tablet 80 mg  80 mg Oral QHS    carvediloL (COREG) tablet 6.25 mg  6.25 mg Oral BID WITH MEALS    docusate sodium (COLACE) capsule 100 mg  100 mg Oral DAILY    isosorbide mononitrate ER (IMDUR) tablet 60 mg  60 mg Oral 7am    nitroglycerin (NITROSTAT) tablet 0.4 mg  0.4 mg SubLINGual Q5MIN PRN    ondansetron (ZOFRAN ODT) tablet 4 mg  4 mg Oral Q8H PRN    oxyCODONE-acetaminophen (PERCOCET 10)  mg per tablet 1 Tablet  1 Tablet Oral Q8H PRN    sucralfate (CARAFATE) tablet 1 g  1 g Oral AC&HS    peritoneal dialysis DEXTROSE 2.5% (2.5 mEq/L low calcium) solution 6,000 mL  6,000 mL IntraPERitoneal DAILY    peritoneal dialysis DEXTROSE 2.5% (2.5 mEq/L low calcium) solution 6,000 mL  6,000 mL IntraPERitoneal DAILY    Warfarin - Pharmacy to dose   Other Rx Dosing/Monitoring    peritoneal dialysis DEXTROSE 2.5% (2.5 mEq/L low calcium) solution 2,000 mL  2,000 mL IntraPERitoneal DAILY    guaiFENesin ER (MUCINEX) tablet 600 mg  600 mg Oral Q12H    [START ON 7/28/2022] epoetin charlie-epbx (RETACRIT) injection 20,000 Units  20,000 Units SubCUTAneous Q7D       Allergies  Allergies   Allergen Reactions    Contrast Dye [Iodine] Anaphylaxis     Tolerates when pre medicated w/ IV solumedrol and benadryl prior to procedure     Shellfish Derived Anaphylaxis    Levaquin [Levofloxacin] Nausea and Vomiting    Morphine Hives and Itching    Nsaids (Non-Steroidal Anti-Inflammatory Drug) Nausea and Vomiting       CBC:  Recent Labs     07/26/22  0920 07/25/22  0246 07/24/22  0542   WBC 12.2* 11.9* 12.0*   HGB 7.7* 7.5* 7.6*   HCT 24.9* 24.5* 24.9*    272 005         Metabolic Panel:  Recent Labs     07/26/22  0920 07/25/22  0246 07/24/22  0944 07/24/22  0542 07/23/22  1346   NA  --  139  --  141 143   K  --  3.3*  --  2.9* 2.7*   CL  --  105  --  104 100   CO2  --  27  --  26 28   BUN  --  41*  --  42* 36*   CREA  --  12.30* --  13.40* 12.85*   GLU  --  117*  --  92 87   CA  --  8.3*  --  8.0* 8.4*   MG  --  1.4* 1.1* 1.1*  --    ALB  --  2.0*  --  2.0* 3.0*   ALT  --  7*  --  8* <5*   INR 1.7* 2.0*  --  3.6*  --               Assessment and Plan   Ethel Santiago is a 61 y.o. female with a PMHx of CAD, HFpEF, T2DM, ESRD on PD, GERD, HTN, JASEN on CPAP, pHTN, DVT/PE on Warfarin, Gout, ILD, Chronic Hypoxia on 2L O2 who is admitted for Hypokalemia. Hypokalemia: pending morning labs, difficulty getting access with fistula. POA K 2.7, s/p KCl 40mEq PO in ED, likely due to nausea/vomiting, EKG largely unchanged from previously  - Nephro on board, appreciate recs     Abdominal Pain: CT Abd/Pel without acute process to explain pain, likely related to ascites. No signs of peritonitis developed due to PD, pt has had no overt symptoms of peritonitis. Fluid analysis sig for RBC 12, neutrophils 10, lymphs 29, monos/macrophages 61. - Zofran for Nausea     ESRD on PD:  - Nephrology consulted, appreciate recommendations  - Avoid Nephrotoxins  - Renally Dose Medications  - Continue Calcitriol 0.5mg daily    Macrocytic Anemia: Likely 2/2 folate deficiency. 7.5 on 7/25, 8.1 on admission. BL 10.5, though 8-9 throughout 6076-5895. Low folate to 4.1, B12 wnl.   - Started on folic acid 1 mg once daily    BL Non-obstructing Renal Stones: stable. - Urology on board    Bilateral simple renal cysts: stable. Renal US 3/22 and 7/25 noting bilateral simple renal cysts. Per Urology, followed at  by Dr. Meryle Neptune. Has been discussed with pt in office. These do not warrant further intervention/ follow up.      CAD: s/p PCI RCA in 2019, f/w Dr. [facility]  - Continue Coreg 6.25mg BID, Imdur ER 60mg daily     HFpEF: last Echo (12/2021) w/ EF 60-65%, normal LV function, low normal RV function, f/w Dr. [facility]  - Monitor for signs/symptoms of HF     T2DM: last HbA1c (3/2022) 4.7, no home medications, POA glucose 87  - Monitor on daily labs     ILD: on 2L O2 at home  - Albuterol PRN     Gout: stable  - Allopurinol 100mg daily     HLD: last Lipid Panel (3/2019) Tot 139 HDL 56 LDL 67 TG 80  - Obtain Lipid Panel  - Continue Atorvastatin 80mg daily     GERD: stable  - Continue Carafate 1g QID     H/o DVT/PE: on Warfarin  - Pharmacy to dose Warfarin     Chronic Pain: stable  - Continue Percocet 10mg TID PRN     Obesity-  - The pt's Body mass index is 39.75 kg/m². - Encouraging lifestyle modifications and further follow up outpatient. FEN/GI - Diabetic diet. Activity - Ambulate with assistance  DVT prophylaxis - Warfarin  GI prophylaxis - Not indicated at this time  Fall prophylaxis - Not indicated at this time. Disposition - Admit to Telemetry. Code Status - Full. Discussed with patient / caregivers.   [facility] [address] [facility] [address] [place],  [place] - [phone]      Johnathan Cardona MD  Family Medicine Resident       For Billing    Chief Complaint   Patient presents with    Abdominal Pain    Cough       Hospital Problems  Date Reviewed: 6/30/2022            Codes Class Noted POA    * (Principal) Hypokalemia ICD-10-CM: E87.6  ICD-9-CM: 276.8  7/23/2022 Yes        Chest pain ICD-10-CM: R07.9  ICD-9-CM: 786.50  7/23/2022 Yes        Abdominal pain ICD-10-CM: R10.9  ICD-9-CM: 789.00  7/23/2022 Yes        Ascites ICD-10-CM: R18.8  ICD-9-CM: 789.59  7/23/2022 Yes        (HFpEF) heart failure with preserved ejection fraction ([facility] [address] ICD-10-CM: I50.30  ICD-9-CM: 428.9  9/23/2018 Yes        ESRD on peritoneal dialysis [facility]) ICD-10-CM: N18.6, Z99.2  ICD-9-CM: 585.6, V45.11  6/23/2018 Yes        Hx of deep venous thrombosis ICD-10-CM: Z86.718  ICD-9-CM: V12.51  5/30/2018 Yes        GERD (gastroesophageal reflux disease) ICD-10-CM: K21.9  ICD-9-CM: 530.81  Unknown Yes        Gout ICD-10-CM: M10.9  ICD-9-CM: 274.9  Unknown Yes        ILD (interstitial lung disease) ([email or web address] ICD-10-CM: J84.9  ICD-9-CM: 872  8/20/2017 Yes        Warfarin anticoagulation ICD-10-CM: Z79.01  ICD-9-CM: V58.61 8/20/2017 Yes        DM (diabetes mellitus), type 2 with renal complications (HCC) (Chronic) ICD-10-CM: E11.29  ICD-9-CM: 250.40  8/9/2013 Yes        HTN (hypertension) (Chronic) ICD-10-CM: I10  ICD-9-CM: 401.9  10/1/2012 Yes        Morbid obesity (Chronic) ICD-10-CM: E66.01  ICD-9-CM: 278.01  10/1/2012 Yes        Pulmonary hypertension (HCC) (Chronic) ICD-10-CM: I27.20  ICD-9-CM: 416.8  3/21/2012 Yes        Coronary artery disease (Chronic) ICD-10-CM: I25.10  ICD-9-CM: 414.00  2/16/2011 Yes    Overview Addendum 10/5/2012  7:33 AM by Judith Garcia, [address] 2004  1v CAD by cath - PCI OM with SAL  Cath 5/2004 - patent stent, no new disease  Lexiscan cardiolite 12/09- normal perfusion, EF 66%  Cath: 3/19/12: PA 55/30 mean 41, wedge 26, RA 24, EDP 35, LVG 55%, LM normal, LAD normal, LCX - OM1 patent stent, OM2 prox 85% long, % with L to R collaterals                JASEN on CPAP (Chronic) ICD-10-CM: G47.33, Z99.89  ICD-9-CM: 327.23, V46.8  2/16/2011 Yes    Overview Signed 3/21/2012  8:04 AM by Stormy Mcbride CPAP

## 2022-07-26 NOTE — PROGRESS NOTES
Spiritual Care Assessment/Progress Note  [facility]      NAME: Tejal Giang      MRN: [id number]  AGE: 61 y.o.  SEX: female  Faith Affiliation: Rachelle Wolf   Language: English     7/26/2022     Total Time (in minutes): 34     Spiritual Assessment begun in [facility] [address] through conversation with:         [x]Patient        [] Family    [] Friend(s)        Reason for Consult: Initial/Spiritual assessment, patient floor     Spiritual beliefs: (Please include comment if needed)     [x] Identifies with a monserrat tradition: Jain     [] Supported by a monserrat community:            [] Claims no spiritual orientation:           [] Seeking spiritual identity:                [] Adheres to an individual form of spirituality:           [] Not able to assess:                           Identified resources for coping:      [x] Prayer                               [] Music                  [] Guided Imagery     [x] Family/friends                 [] Pet visits     [] Devotional reading                         [] Unknown     [] Other:                                               Interventions offered during this visit: (See comments for more details)    Patient Interventions: Affirmation of emotions/emotional suffering, Affirmation of monserrat, Catharsis/review of pertinent events in supportive environment, Coping skills reviewed/reinforced, Iconic (affirming the presence of God/Higher Power), Normalization of emotional/spiritual concerns, Prayer (actual)           Plan of Care:     [] Support spiritual and/or cultural needs    [] Support AMD and/or advance care planning process      [] Support grieving process   [] Coordinate Rites and/or Rituals    [] Coordination with community clergy   [] No spiritual needs identified at this time   [] Detailed Plan of Care below (See Comments)  [] Make referral to Music Therapy  [] Make referral to Pet Therapy     [] Make referral to Addiction services  [] Make referral to [facility]  [] Make referral to Spiritual Care Partner  [] No future visits requested        [x] Contact Spiritual Care for further referrals       Visited patient for initial spiritual assessment. She  Pt's chart was consulted. She spoke of her stay here and the events leading up to same. Her  is unable to be with her today as he is receiving a Chemo treatment at a different facility. She reports having a good support system around her. She is a person of David Porras monserrat and derives as sense of hope and support from her monserrat.   Chaplain Rica, MDiv, MS, [facility]

## 2022-07-26 NOTE — PROGRESS NOTES
CM Note:  Pt to d/c home today transported by her . No CM needs identified. Pt will follow up OP with providers as scheduled.   CRISTINA Gutierrez

## 2022-07-26 NOTE — PROGRESS NOTES
Bedside and Verbal shift change report given to [address] Ying Barrera RN (oncoming nurse) by Marian Ndiaye RN (offgoing nurse). Report included the following information SBAR, Kardex, Intake/Output, MAR, Accordion, and Recent Results. 6521 - Pt stating \"I am going to unhook myself from my PD. \" Educated patient patient on not unhooking herself and waiting on dialysis RN. Pt adamantly stating, \"I will not stay hooked up for hours. \" Attempted to re-educate. RN to call dialysis nurse, patient aware. Disha 56, awating returned phone call from on-call RN.     Quoc Serve with Ginger w/ dialysis on the phone. Per RN patient planned to dwell until 9 am, then RN was to manually drain. RN notified pt already draining before dwell time over. RN to come see patient. This patient was assisted with Intentional Toileting every 2 hours during this shift as appropriate. Documentation of ambulation and output reflected on Flowsheet as appropriate. Purposeful hourly rounding was completed using AIDET and 5Ps. Outcomes of PHR documented as they occurred. Bed alarm in use as appropriate. Dual Suction and ambubag in place. Discharge instructions reviewed with patient and questions answered. Paperwork signed and placed on chart.

## 2022-07-26 NOTE — DISCHARGE INSTRUCTIONS
HOME DISCHARGE INSTRUCTIONS    Eugene Emir / [id number] : 1955    Admission date: 2022 Discharge date: 2022 12:51 PM     Please bring this form with you to show your care provider at your follow-up appointment. Primary care provider:  Raymundo Awan      Discharging provider:  Duong Berrios MD  - Parul Mireles MD -  Family Medicine Attending      You have been admitted to the hospital with the following diagnoses: You came in with abdominal pain, chest pain, low potassium, and anemia. Serious causes of your chest pain were ruled out with labs and imaging. We ruled out serious causes of your abdominal pain with a CT scan and Ultrasounds and we think it was likely caused by increased fluid in your abdomen from your peritoneal dialysis. Your low potassium was likely from nausea and vomiting. Please take potassium supplements to help increase this electrolyte. Your anemia is likely caused by chronic disease, though you were also low in folate which can lead to anemia. Please supplement with folic acid once daily and follow up with PCP to recheck your blood levels. The CT scan and Ultrasound confirmed presence of stable, unchanged cysts on your kidneys. There is no further management for these at this time. ACUTE DIAGNOSES:  Ascites [R18.8]  Hypokalemia [E87.6]  Abdominal pain [R10.9]  Chest pain [R07.9]      FOLLOW-UP CARE RECOMMENDATIONS:    Appointments  PCP and Nephro. F/u with GI with Dr. Dimitrios AMBROCIO/ Milton Ramirez [address] [place] [address], [facility] [address], Phone: [phone]     Follow-up tests needed: Recheck INR, potassium, magnesium, folate levels    Pending test results: At the time of your discharge the following test results are still pending: N/A. Please make sure you review these results with your outpatient follow-up provider(s).     DIET/what to eat:  Regular Diet    ACTIVITY:  Activity as tolerated    Wound care: N/A    Equipment needed:  N/A    Specific symptoms to watch for: chest pain, shortness of breath, fever, chills, nausea, vomiting, diarrhea, change in mentation, falling, weakness, bleeding. What to do if new or unexpected symptoms occur? If you experience any of the above symptoms (or should other concerns or questions arise after discharge) please call your primary care physician. Return to the emergency room if you cannot get hold of your doctor. It is very important that you keep your follow-up appointment(s). Please bring discharge papers, medication list (and/or medication bottles) to your follow-up appointments for review by your outpatient provider(s). Please check the list of medications and be sure it includes every medication (even non-prescription medications) that your provider wants you to take. It is important that you take the medication exactly as they are prescribed. Keep your medication in the bottles provided by the pharmacist and keep a list of the medication names, dosages, and times to be taken in your wallet. Do not take other medications without consulting your doctor. If you have any questions about your medications or other instructions, please talk to your nurse or care provider before you leave the hospital.     Information obtained by:     I understand that if any problems occur once I am at home I am to contact my physician. These instructions were explained to me and I had the opportunity to ask questions. I understand and acknowledge receipt of the instructions indicated above.                                                                                                                                                Physician's or R.N.'s Signature                                                                  Date/Time                                                                                                                                              Patient or Representative Signature Date/Time

## 2022-07-28 ENCOUNTER — OFFICE VISIT (OUTPATIENT)
Dept: FAMILY MEDICINE CLINIC | Age: 67
End: 2022-07-28
Payer: MEDICARE

## 2022-07-28 VITALS
HEART RATE: 87 BPM | DIASTOLIC BLOOD PRESSURE: 65 MMHG | OXYGEN SATURATION: 94 % | RESPIRATION RATE: 16 BRPM | SYSTOLIC BLOOD PRESSURE: 121 MMHG

## 2022-07-28 DIAGNOSIS — G89.29 CHRONIC CHEST PAIN: ICD-10-CM

## 2022-07-28 DIAGNOSIS — G89.29 CHRONIC BILATERAL LOW BACK PAIN, UNSPECIFIED WHETHER SCIATICA PRESENT: ICD-10-CM

## 2022-07-28 DIAGNOSIS — E87.6 HYPOKALEMIA: ICD-10-CM

## 2022-07-28 DIAGNOSIS — F33.0 MILD EPISODE OF RECURRENT MAJOR DEPRESSIVE DISORDER (HCC): ICD-10-CM

## 2022-07-28 DIAGNOSIS — I25.118 CORONARY ARTERY DISEASE OF NATIVE ARTERY OF NATIVE HEART WITH STABLE ANGINA PECTORIS (HCC): Primary | ICD-10-CM

## 2022-07-28 DIAGNOSIS — E83.42 HYPOMAGNESEMIA: ICD-10-CM

## 2022-07-28 DIAGNOSIS — M25.561 CHRONIC PAIN OF RIGHT KNEE: ICD-10-CM

## 2022-07-28 DIAGNOSIS — G89.29 CHRONIC PAIN OF RIGHT KNEE: ICD-10-CM

## 2022-07-28 DIAGNOSIS — R07.9 CHRONIC CHEST PAIN: ICD-10-CM

## 2022-07-28 DIAGNOSIS — Z79.01 WARFARIN ANTICOAGULATION: ICD-10-CM

## 2022-07-28 DIAGNOSIS — E87.8 ELECTROLYTE DISTURBANCE: ICD-10-CM

## 2022-07-28 DIAGNOSIS — E53.8 FOLIC ACID DEFICIENCY: ICD-10-CM

## 2022-07-28 DIAGNOSIS — M54.50 CHRONIC BILATERAL LOW BACK PAIN, UNSPECIFIED WHETHER SCIATICA PRESENT: ICD-10-CM

## 2022-07-28 LAB
INR BLD: 2
PT POC: 23.7 SECONDS
VALID INTERNAL CONTROL?: YES

## 2022-07-28 PROCEDURE — 99495 TRANSJ CARE MGMT MOD F2F 14D: CPT | Performed by: FAMILY MEDICINE

## 2022-07-28 PROCEDURE — G8427 DOCREV CUR MEDS BY ELIG CLIN: HCPCS | Performed by: FAMILY MEDICINE

## 2022-07-28 PROCEDURE — 85610 PROTHROMBIN TIME: CPT | Performed by: FAMILY MEDICINE

## 2022-07-28 RX ORDER — OXYCODONE AND ACETAMINOPHEN 10; 325 MG/1; MG/1
1 TABLET ORAL
Qty: 90 TABLET | Refills: 0 | Status: SHIPPED | OUTPATIENT
Start: 2022-08-10 | End: 2022-08-08

## 2022-07-28 RX ORDER — OXYCODONE AND ACETAMINOPHEN 10; 325 MG/1; MG/1
1 TABLET ORAL
Qty: 90 TABLET | Refills: 0 | Status: SHIPPED | OUTPATIENT
Start: 2022-09-09 | End: 2022-08-08

## 2022-07-28 RX ORDER — DRONABINOL 2.5 MG/1
2.5 CAPSULE ORAL 2 TIMES DAILY
COMMUNITY
Start: 2022-06-28 | End: 2022-07-28

## 2022-07-28 RX ORDER — OXYCODONE AND ACETAMINOPHEN 10; 325 MG/1; MG/1
1 TABLET ORAL
Qty: 90 TABLET | Refills: 0 | Status: SHIPPED | OUTPATIENT
Start: 2022-09-09 | End: 2022-09-01

## 2022-07-28 NOTE — PROGRESS NOTES
Verified name and birth date for privacy precautions. Chart reviewed in preparation for today's visit. Chief Complaint   Patient presents with    Coagulation disorder    Hospital Follow Up     Hypokalemia 7.23 - 7.26.2022          Health Maintenance Due   Topic    Pneumococcal 0-64 years (1 - PCV)    Shingrix Vaccine Age 50> (1 of 2)    DTaP/Tdap/Td series (1 - Tdap)    Low dose CT lung screening     Eye Exam Retinal or Dilated     Foot Exam Q1     COVID-19 Vaccine (4 - Booster for Moderna series)         Wt Readings from Last 3 Encounters:   07/26/22 284 lb (128.8 kg)   07/06/22 280 lb (127 kg)   06/08/22 276 lb (125.2 kg)     Temp Readings from Last 3 Encounters:   07/26/22 99 °F (37.2 °C)   07/06/22 98.6 °F (37 °C) (Oral)   06/30/22 98.4 °F (36.9 °C) (Oral)     BP Readings from Last 3 Encounters:   07/28/22 121/65   07/26/22 134/66   07/06/22 (!) 97/56     Pulse Readings from Last 3 Encounters:   07/28/22 87   07/26/22 99   07/06/22 66         Learning Assessment:  :     Learning Assessment 6/28/2019 3/20/2018   PRIMARY LEARNER Patient Patient   PRIMARY LANGUAGE ENGLISH ENGLISH   LEARNER PREFERENCE PRIMARY LISTENING LISTENING   ANSWERED BY patient  patient    RELATIONSHIP SELF SELF       Depression Screening:  :     3 most recent PHQ Screens 7/28/2022   Little interest or pleasure in doing things Several days   Feeling down, depressed, irritable, or hopeless Nearly every day   Total Score PHQ 2 4       Fall Risk Assessment:  :     Fall Risk Assessment, last 12 mths 2/17/2021   Able to walk? Yes   Fall in past 12 months? 0   Do you feel unsteady? 0   Are you worried about falling 0       Abuse Screening:  :     Abuse Screening Questionnaire 1/15/2021 9/15/2020 8/18/2020 7/30/2020 7/23/2020   Do you ever feel afraid of your partner? N N N N N   Are you in a relationship with someone who physically or mentally threatens you? N N N N N   Is it safe for you to go home?  Jessica Peck       Coordination of Care Questionnaire:  :     1) Have you been to an emergency room, urgent care clinic since your last visit? yes   Hospitalized since your last visit? yes             2) Have you seen or consulted any other health care providers outside of [address] [place] [address] since your last visit? no  (Include any pap smears or colon screenings in this section.)    3) Do you have an Advance Directive on file? no      Patient is currently accompanied by her spouse & I have received verbal consent from Beryl Arenas to discuss any/all medical information while he/she is present in the room.     ------------------------------------------------    Results for orders placed or performed in visit on 07/28/22   AMB POC PT/INR   Result Value Ref Range    VALID INTERNAL CONTROL POC Yes     Prothrombin time (POC) 23.7 seconds    INR POC 2.0

## 2022-07-28 NOTE — PATIENT INSTRUCTIONS
A Healthy Lifestyle: Care Instructions  Your Care Instructions     A healthy lifestyle can help you feel good, stay at a healthy weight, and have plenty of energy for both work and play. A healthy lifestyle is something you can share with your whole family. A healthy lifestyle also can lower your risk for serious health problems, such as high blood pressure, heart disease, and diabetes. You can follow a few steps listed below to improve your health and the health of your family. Follow-up care is a key part of your treatment and safety. Be sure to make and go to all appointments, and call your doctor if you are having problems. It's also a good idea to know your test results and keep a list of the medicines you take. How can you care for yourself at home? Do not eat too much sugar, fat, or fast foods. You can still have dessert and treats now and then. The goal is moderation. Start small to improve your eating habits. Pay attention to portion sizes, drink less juice and soda pop, and eat more fruits and vegetables. Eat a healthy amount of food. A 3-ounce serving of meat, for example, is about the size of a deck of cards. Fill the rest of your plate with vegetables and whole grains. Limit the amount of soda and sports drinks you have every day. Drink more water when you are thirsty. Eat plenty of fruits and vegetables every day. Have an apple or some carrot sticks as an afternoon snack instead of a candy bar. Try to have fruits and/or vegetables at every meal.  Make exercise part of your daily routine. You may want to start with simple activities, such as walking, bicycling, or slow swimming. Try to be active 30 to 60 minutes every day. You do not need to do all 30 to 60 minutes all at once. For example, you can exercise 3 times a day for 10 or 20 minutes. Moderate exercise is safe for most people, but it is always a good idea to talk to your doctor before starting an exercise program.  Keep moving.  Jamar Arreguin the lawn, work in the garden, or clean your house. Take the stairs instead of the elevator at work. If you smoke, quit. People who smoke have an increased risk for heart attack, stroke, cancer, and other lung illnesses. Quitting is hard, but there are ways to boost your chance of quitting tobacco for good. Use nicotine gum, patches, or lozenges. Ask your doctor about stop-smoking programs and medicines. Keep trying. In addition to reducing your risk of diseases in the future, you will notice some benefits soon after you stop using tobacco. If you have shortness of breath or asthma symptoms, they will likely get better within a few weeks after you quit. Limit how much alcohol you drink. Moderate amounts of alcohol (up to 2 drinks a day for men, 1 drink a day for women) are okay. But drinking too much can lead to liver problems, high blood pressure, and other health problems. Family health  If you have a family, there are many things you can do together to improve your health. Eat meals together as a family as often as possible. Eat healthy foods. This includes fruits, vegetables, lean meats and dairy, and whole grains. Include your family in your fitness plan. Most people think of activities such as jogging or tennis as the way to fitness, but there are many ways you and your family can be more active. Anything that makes you breathe hard and gets your heart pumping is exercise. Here are some tips:  Walk to do errands or to take your child to school or the bus. Go for a family bike ride after dinner instead of watching TV. Where can you learn more? Go to http://www.gray.com/  Enter O180 in the search box to learn more about \"A Healthy Lifestyle: Care Instructions. \"  Current as of: June 16, 2021               Content Version: 13.2  © 8994-8795 [employer].    Care instructions adapted under license by [employer] (which disclaims liability or warranty for this information). If you have questions about a medical condition or this instruction, always ask your healthcare professional. [place] [address] any warranty or liability for your use of this information.

## 2022-07-28 NOTE — PROGRESS NOTES
Progress Note    she is a 61y.o. year old female who presents for evalution. Subjective:     Patient here for INR management current therapeutic at 2.0. No issues with bleeding. Patient also for follow-up from recent hospitalization for electrolyte disturbance hypokalemia after emesis. She had also missed around with PD for her CKD. Potassium was repleted advised to take 20 M EQ daily. Folic acid level was also low at 4.1 started on folic acid daily since she had a macrocytic anemia. She has been in hospital multiple times this past month, feeling down, 1 thing after another. She is not interested in anything for this. Took zoloft and made her feel suicidal.   Discussed possible cymbalta to help with mood and MSK pain and she will think on this. She does also need a refill for her chronic pain. This does work well for her with no sedation or issues with abuse or misuse. Opioid/Pain Management:    1. Has the patient signed a pain contract for chronic narcotic use? yes  2. Has the patient had a UDS or Serum screen as per guidelines as per [facility]?:   yes    3. Does patient meet necessary guidelines for Naloxone treatement per the [facility]?:    no  4. Has the Prescription Monitoring Program been reviewed? yes  5. Does patient have a long term condition that requires long term use of a Narcotic?  yes  6. Has patient been tolerant of therapy and responsible to routine follow up and specialist follow-up? Yes      Reviewed PmHx, RxHx, FmHx, SocHx, AllgHx and updated and dated in the chart. Review of Systems - negative except as listed above in the HPI    Objective:     Vitals:    07/28/22 1456   BP: 121/65   Pulse: 87   Resp: 16   SpO2: 94%       Current Outpatient Medications   Medication Sig    [START ON 9/9/2022] oxyCODONE-acetaminophen (PERCOCET 10)  mg per tablet Take 1 Tablet by mouth every eight (8) hours as needed for Pain for up to 30 days. Max Daily Amount: 3 Tablets. [START ON 9/9/2022] oxyCODONE-acetaminophen (PERCOCET 10)  mg per tablet Take 1 Tablet by mouth every eight (8) hours as needed for Pain for up to 30 days. Max Daily Amount: 3 Tablets. [START ON 8/10/2022] oxyCODONE-acetaminophen (PERCOCET 10)  mg per tablet Take 1 Tablet by mouth every eight (8) hours as needed for Pain for up to 30 days. Max Daily Amount: 3 Tablets. folic acid (FOLVITE) 1 mg tablet Take 1 Tablet by mouth in the morning. potassium chloride (K-DUR, KLOR-CON M20) 20 mEq tablet Take 1 Tablet by mouth in the morning. warfarin (COUMADIN) 1 mg tablet Take 1 mg by mouth every Monday, Wednesday, Friday. carvediloL (COREG) 6.25 mg tablet Take 1 Tablet by mouth two (2) times daily (with meals). ondansetron (ZOFRAN ODT) 4 mg disintegrating tablet Take 1 Tablet by mouth every eight (8) hours as needed for Nausea or Vomiting.    isosorbide mononitrate ER (IMDUR) 120 mg CR tablet Take 0.5 Tablets by mouth every morning. atorvastatin (LIPITOR) 80 mg tablet TAKE 1 TABLET BY MOUTH ONCE DAILY NIGHTLY    allopurinoL (ZYLOPRIM) 100 mg tablet Take 1 tablet by mouth once daily    CALCITRIOL PO Take 0.5 mg by mouth.    sucralfate (CARAFATE) 1 gram tablet Take 1 tablet by mouth 4 times daily    docusate sodium (STOOL SOFTENER PO) Take  by mouth daily. albuterol (PROAIR HFA) 90 mcg/actuation inhaler Take 2 Puffs by inhalation every four (4) hours as needed for Wheezing. Oxygen O2 2L NC 24/7    nitroglycerin (NITROSTAT) 0.4 mg SL tablet 1 Tab by SubLINGual route every five (5) minutes as needed for Chest Pain. Up to 3 doses. (Patient not taking: Reported on 7/28/2022)     No current facility-administered medications for this visit. Physical Examination: General appearance - chronically ill appearing  Mental status - alert, oriented to person, place, and time      Assessment/ Plan:   Diagnoses and all orders for this visit:    1.  Coronary artery disease of native artery of native heart with stable angina pectoris (HCC)  -     AMB POC PT/INR    2. Warfarin anticoagulation  -     AMB POC PT/INR  At goal no change follow-up 1 month for recheck  3. Folic acid deficiency  -     FOLATE; Future    4. Electrolyte disturbance  -     RENAL FUNCTION PANEL; Future    5. Hypomagnesemia  -     RENAL FUNCTION PANEL; Future    6. Hypokalemia  -     RENAL FUNCTION PANEL; Future    7. Chronic chest pain  -     oxyCODONE-acetaminophen (PERCOCET 10)  mg per tablet; Take 1 Tablet by mouth every eight (8) hours as needed for Pain for up to 30 days. Max Daily Amount: 3 Tablets. -     oxyCODONE-acetaminophen (PERCOCET 10)  mg per tablet; Take 1 Tablet by mouth every eight (8) hours as needed for Pain for up to 30 days. Max Daily Amount: 3 Tablets. -     oxyCODONE-acetaminophen (PERCOCET 10)  mg per tablet; Take 1 Tablet by mouth every eight (8) hours as needed for Pain for up to 30 days. Max Daily Amount: 3 Tablets. 8. Chronic bilateral low back pain, unspecified whether sciatica present  -     oxyCODONE-acetaminophen (PERCOCET 10)  mg per tablet; Take 1 Tablet by mouth every eight (8) hours as needed for Pain for up to 30 days. Max Daily Amount: 3 Tablets. -     oxyCODONE-acetaminophen (PERCOCET 10)  mg per tablet; Take 1 Tablet by mouth every eight (8) hours as needed for Pain for up to 30 days. Max Daily Amount: 3 Tablets. -     oxyCODONE-acetaminophen (PERCOCET 10)  mg per tablet; Take 1 Tablet by mouth every eight (8) hours as needed for Pain for up to 30 days. Max Daily Amount: 3 Tablets. 9. Chronic pain of right knee  -     oxyCODONE-acetaminophen (PERCOCET 10)  mg per tablet; Take 1 Tablet by mouth every eight (8) hours as needed for Pain for up to 30 days. Max Daily Amount: 3 Tablets. -     oxyCODONE-acetaminophen (PERCOCET 10)  mg per tablet;  Take 1 Tablet by mouth every eight (8) hours as needed for Pain for up to 30 days. Max Daily Amount: 3 Tablets. -     oxyCODONE-acetaminophen (PERCOCET 10)  mg per tablet; Take 1 Tablet by mouth every eight (8) hours as needed for Pain for up to 30 days. Max Daily Amount: 3 Tablets. 10. Mild episode of recurrent major depressive disorder ([facility] [address]  Discussed possibly starting duloxetine given the name and she will think on this. Follow-up and Dispositions    Return in about 4 weeks (around 8/25/2022), or if symptoms worsen or fail to improve, for INR check. I have discussed the diagnosis with the patient and the intended plan as seen in the above orders. The patient has received an after-visit summary and questions were answered concerning future plans. Pt conveyed understanding of plan.     Medication Side Effects and Warnings were discussed with patient    An electronic signature was used to authenticate this note  Verito Elizondo, DO

## 2022-07-29 LAB
ALBUMIN SERPL-MCNC: 3 G/DL (ref 3.8–4.8)
BACTERIA SPEC CULT: NORMAL
BUN SERPL-MCNC: 37 MG/DL (ref 8–27)
BUN/CREAT SERPL: 4 (ref 12–28)
CALCIUM SERPL-MCNC: 10.1 MG/DL (ref 8.7–10.3)
CHLORIDE SERPL-SCNC: 99 MMOL/L (ref 96–106)
CO2 SERPL-SCNC: 24 MMOL/L (ref 20–29)
CREAT SERPL-MCNC: 10.15 MG/DL (ref 0.57–1)
EGFR: 4 ML/MIN/1.73
FOLATE SERPL-MCNC: 4 NG/ML
GLUCOSE SERPL-MCNC: 107 MG/DL (ref 65–99)
GRAM STN SPEC: NORMAL
GRAM STN SPEC: NORMAL
INTERPRETATION: NORMAL
PHOSPHATE SERPL-MCNC: 4 MG/DL (ref 3–4.3)
POTASSIUM SERPL-SCNC: 3.5 MMOL/L (ref 3.5–5.2)
SERVICE CMNT-IMP: NORMAL
SODIUM SERPL-SCNC: 143 MMOL/L (ref 134–144)

## 2022-08-08 ENCOUNTER — APPOINTMENT (OUTPATIENT)
Dept: ULTRASOUND IMAGING | Age: 67
End: 2022-08-08
Attending: FAMILY MEDICINE
Payer: MEDICARE

## 2022-08-08 ENCOUNTER — HOSPITAL ENCOUNTER (EMERGENCY)
Age: 67
Discharge: HOME | End: 2022-08-08
Attending: EMERGENCY MEDICINE
Payer: MEDICARE

## 2022-08-08 ENCOUNTER — APPOINTMENT (OUTPATIENT)
Dept: GENERAL RADIOLOGY | Age: 67
End: 2022-08-08
Attending: FAMILY MEDICINE
Payer: MEDICARE

## 2022-08-08 VITALS
HEART RATE: 87 BPM | RESPIRATION RATE: 20 BRPM | SYSTOLIC BLOOD PRESSURE: 160 MMHG | HEIGHT: 70 IN | BODY MASS INDEX: 41.95 KG/M2 | DIASTOLIC BLOOD PRESSURE: 58 MMHG | TEMPERATURE: 98 F | WEIGHT: 293 LBS | OXYGEN SATURATION: 96 %

## 2022-08-08 DIAGNOSIS — E87.70 HYPERVOLEMIA, UNSPECIFIED HYPERVOLEMIA TYPE: Primary | ICD-10-CM

## 2022-08-08 DIAGNOSIS — R22.32 LOCALIZED SWELLING OF LEFT UPPER EXTREMITY: ICD-10-CM

## 2022-08-08 DIAGNOSIS — R60.0 LOWER EXTREMITY EDEMA: ICD-10-CM

## 2022-08-08 LAB
ALBUMIN SERPL-MCNC: 2.9 G/DL (ref 3.5–5.2)
ALBUMIN/GLOB SERPL: 1 {RATIO} (ref 1.1–2.2)
ALP SERPL-CCNC: 84 U/L (ref 35–104)
ALT SERPL-CCNC: <5 U/L (ref 10–35)
ANION GAP SERPL CALC-SCNC: 15 MMOL/L (ref 5–15)
AST SERPL-CCNC: 10 U/L (ref 10–35)
BASOPHILS # BLD: 0 K/UL (ref 0–0.1)
BASOPHILS NFR BLD: 0 % (ref 0–1)
BILIRUB SERPL-MCNC: 0.3 MG/DL (ref 0.2–1)
BNP SERPL-MCNC: 3673 PG/ML
BUN SERPL-MCNC: 39 MG/DL (ref 8–23)
BUN/CREAT SERPL: 4 (ref 12–20)
CALCIUM SERPL-MCNC: 8.7 MG/DL (ref 8.8–10.2)
CHLORIDE SERPL-SCNC: 100 MMOL/L (ref 98–107)
CO2 SERPL-SCNC: 27 MMOL/L (ref 22–29)
CREAT SERPL-MCNC: 10.5 MG/DL (ref 0.5–0.9)
DIFFERENTIAL METHOD BLD: ABNORMAL
EOSINOPHIL # BLD: 0.6 K/UL (ref 0–0.4)
EOSINOPHIL NFR BLD: 6 % (ref 0–7)
ERYTHROCYTE [DISTWIDTH] IN BLOOD BY AUTOMATED COUNT: 14.6 % (ref 11.5–14.5)
GLOBULIN SER CALC-MCNC: 3 G/DL (ref 2–4)
GLUCOSE SERPL-MCNC: 87 MG/DL (ref 65–100)
HCT VFR BLD AUTO: 23.2 % (ref 35–47)
HGB BLD-MCNC: 7.3 G/DL (ref 11.5–16)
IMM GRANULOCYTES # BLD AUTO: 0 K/UL (ref 0–0.04)
IMM GRANULOCYTES NFR BLD AUTO: 0 % (ref 0–0.5)
INR PPP: 2.1 (ref 0.9–1.1)
LYMPHOCYTES # BLD: 1.6 K/UL (ref 0.8–3.5)
LYMPHOCYTES NFR BLD: 17 % (ref 12–49)
MCH RBC QN AUTO: 31.5 PG (ref 26–34)
MCHC RBC AUTO-ENTMCNC: 31.5 G/DL (ref 30–36.5)
MCV RBC AUTO: 100 FL (ref 80–99)
MONOCYTES # BLD: 0.7 K/UL (ref 0–1)
MONOCYTES NFR BLD: 7 % (ref 5–13)
NEUTS SEG # BLD: 6.6 K/UL (ref 1.8–8)
NEUTS SEG NFR BLD: 69 % (ref 32–75)
NRBC # BLD: 0 K/UL (ref 0–0.01)
NRBC BLD-RTO: 0 PER 100 WBC
PLATELET # BLD AUTO: 249 K/UL (ref 150–400)
PMV BLD AUTO: 10.1 FL (ref 8.9–12.9)
POTASSIUM SERPL-SCNC: 4.2 MMOL/L (ref 3.5–5.1)
PROT SERPL-MCNC: 5.9 G/DL (ref 6.4–8.3)
PROTHROMBIN TIME: 23.6 SEC (ref 11.9–14.6)
RBC # BLD AUTO: 2.32 M/UL (ref 3.8–5.2)
SODIUM SERPL-SCNC: 142 MMOL/L (ref 136–145)
WBC # BLD AUTO: 9.5 K/UL (ref 3.6–11)

## 2022-08-08 PROCEDURE — 99285 EMERGENCY DEPT VISIT HI MDM: CPT

## 2022-08-08 PROCEDURE — 93971 EXTREMITY STUDY: CPT

## 2022-08-08 PROCEDURE — 85610 PROTHROMBIN TIME: CPT

## 2022-08-08 PROCEDURE — 36415 COLL VENOUS BLD VENIPUNCTURE: CPT

## 2022-08-08 PROCEDURE — 74011250637 HC RX REV CODE- 250/637: Performed by: FAMILY MEDICINE

## 2022-08-08 PROCEDURE — 93005 ELECTROCARDIOGRAM TRACING: CPT

## 2022-08-08 PROCEDURE — 83880 ASSAY OF NATRIURETIC PEPTIDE: CPT

## 2022-08-08 PROCEDURE — 71046 X-RAY EXAM CHEST 2 VIEWS: CPT

## 2022-08-08 PROCEDURE — 84484 ASSAY OF TROPONIN QUANT: CPT

## 2022-08-08 PROCEDURE — 80053 COMPREHEN METABOLIC PANEL: CPT

## 2022-08-08 PROCEDURE — 85025 COMPLETE CBC W/AUTO DIFF WBC: CPT

## 2022-08-08 RX ORDER — OXYCODONE AND ACETAMINOPHEN 5; 325 MG/1; MG/1
2 TABLET ORAL
Status: COMPLETED | OUTPATIENT
Start: 2022-08-08 | End: 2022-08-08

## 2022-08-08 RX ADMIN — OXYCODONE HYDROCHLORIDE AND ACETAMINOPHEN 2 TABLET: 5; 325 TABLET ORAL at 17:50

## 2022-08-08 NOTE — ED PROVIDER NOTES
Patient is a 72-year-old female with past medical history of CKD on dialysis, chronic pain, type 2 diabetes, GERD, failure presenting for evaluation of multiple complaints. Reports that for the past few days, she has had chest pain that comes and goes. Reports the pain feels like her normal angina. Some occasional shortness of breath. No cough or hemoptysis. Additionally notes that she has had some atraumatic left arm swelling. Thinks that she had a normal Doppler approximately 1 week ago. Notes that over the past few days, her right lower extremity became swollen and she developed calf tenderness to that side. Reports previous blood clot to that leg in 2019 after a knee replacement surgery. Currently takes Coumadin daily. Past Medical History:   Diagnosis Date     Sleep Apnea 2/16/2011    Compliant with c-pap. Aortic aneurysm (HCC)     Abdominal    CAD (coronary Artery Disease) Native Artery 2/16/2011    Chronic kidney disease     Hemodiaylsis  3x/week    Chronic pain     CKD (chronic kidney disease) stage 4, GFR 15-29 ml/min ([facility]) 2/10/2017    Diabetic gastroparesis (HCC)     Diastolic heart failure ([address] 10/5/2012    DM type 2 causing neurological disease ([address]     DM type 2 causing renal disease (HCC)     Insulin SS at night only PRN    DM type 2 causing vascular disease ([address]     Esophageal stricture 2012    dialted Dr. Doug Gold    G6PD deficiency     trait    GERD (gastroesophageal reflux disease)     Gout     Heart failure ([address]     Hemodialysis access, AV graft ([address] 04/02/2017    Dialysis MWF    [address]      Hypertension     ILD (interstitial lung disease) ([address]     open lung bx CJW 2010    Infected dental caries 3/17/2020    Infection of total right knee replacement ([address] 3/17/2020    Loss of appetite 3/17/2020    Morbid obesity ([address]     OA (osteoarthritis)     Ovarian cancer ([address]     cervical and uterine    Rheumatoid arteritis (HCC)     S/P left pulmonary artery pressure sensor implant placement 2017    Cardiomems     Thromboembolus ([facility] [address]     Right calf     Tobacco use disorder 3/21/2012    Uterine cervix cancer ([facility] [address]     Vitamin D deficiency 2013       Past Surgical History:   Procedure Laterality Date    COLONOSCOPY N/A 2016    COLONOSCOPY performed by Arun Garcia MD at [address]    HX ADENOIDECTOMY      HX APPENDECTOMY      HX CARPAL TUNNEL RELEASE      bilateral    HX CHOLECYSTECTOMY      HX HEART CATHETERIZATION      x 7    HX HERNIA REPAIR      HX HYSTERECTOMY      HX ORTHOPAEDIC Right 12    right knee replacement    HX ORTHOPAEDIC Bilateral     carpal tunnel repair    HX ORTHOPAEDIC Right     PLATE IN ANKLE DUE TO FRACTURE    HX SALPINGO-OOPHORECTOMY      HX TONSIL AND ADENOIDECTOMY      HX TONSILLECTOMY      HX VASCULAR ACCESS Left     Left lower arm AV fistula    IR INSERT PERITONEAL VENOUS SHUNT      MA CARDIAC SURG PROCEDURE UNLIST  02/2019    x4 with last sttent placed 2019.     MA CHEST SURGERY PROCEDURE UNLISTED Right     > 20 years ago  RLL removed     UPPER GI ENDOSCOPY,DILATN W GUIDE  2016              Family History:   Problem Relation Age of Onset    Heart Disease Mother     No Known Problems Daughter     No Known Problems Son     No Known Problems Son     Anesth Problems Neg Hx        Social History     Socioeconomic History    Marital status:      Spouse name: Not on file    Number of children: Not on file    Years of education: Not on file    Highest education level: Not on file   Occupational History    Not on file   Tobacco Use    Smoking status: Former     Packs/day: 0.50     Years: 41.00     Pack years: 20.50     Types: Cigarettes     Quit date: 2014     Years since quittin.7    Smokeless tobacco: Never   Vaping Use    Vaping Use: Never used   Substance and Sexual Activity    Alcohol use: No    Drug use: No    Sexual activity: Not on file   Other Topics Concern    Not on file   Social History Narrative Not on file     Social Determinants of Health     Financial Resource Strain: Low Risk     Difficulty of Paying Living Expenses: Not hard at all   Food Insecurity: No Food Insecurity    Worried About Running Out of Food in the Last Year: Never true    Ran Out of Food in the Last Year: Never true   Transportation Needs: Not on file   Physical Activity: Not on file   Stress: Not on file   Social Connections: Not on file   Intimate Partner Violence: Not on file   Housing Stability: Not on file         ALLERGIES: Contrast dye [iodine], Shellfish derived, Levaquin [levofloxacin], Morphine, and Nsaids (non-steroidal anti-inflammatory drug)    Review of Systems   Constitutional:  Negative for unexpected weight change. HENT:  Negative for congestion. Eyes:  Negative for visual disturbance. Respiratory:  Positive for shortness of breath. Negative for cough and chest tightness. Cardiovascular:  Positive for chest pain and leg swelling. Gastrointestinal:  Negative for abdominal pain, nausea and vomiting. Endocrine: Negative for polyuria. Genitourinary:  Negative for dysuria and flank pain. Musculoskeletal:  Positive for arthralgias. Skin:  Negative for color change. Allergic/Immunologic: Negative for immunocompromised state. Neurological:  Negative for dizziness and headaches. Hematological:  Negative for adenopathy. Psychiatric/Behavioral:  Negative for agitation. Vitals:    08/08/22 1611   BP: (!) 140/55   Pulse: 77   Resp: 20   Temp: 98.6 °F (37 °C)   SpO2: 99%   Weight: 133.8 kg (295 lb)   Height: 5' 10\" (1.778 m)            Physical Exam  Vitals and nursing note reviewed. Constitutional:       Appearance: She is well-developed. She is obese. HENT:      Head: Atraumatic. Eyes:      Pupils: Pupils are equal, round, and reactive to light. Cardiovascular:      Rate and Rhythm: Normal rate and regular rhythm.       Pulses:           Dorsalis pedis pulses are 2+ on the right side and 2+ on the left side. Posterior tibial pulses are 2+ on the right side and 2+ on the left side. Heart sounds: Normal heart sounds. Pulmonary:      Effort: Pulmonary effort is normal.      Breath sounds: Normal breath sounds. Chest:      Chest wall: No tenderness. Abdominal:      Palpations: Abdomen is soft. Musculoskeletal:         General: Normal range of motion. Arms:       Cervical back: Neck supple. Comments: LLE and RLE appear equal in size, nonpitting edema present bilaterally. NVI. Right calf tenderness present   Skin:     General: Skin is warm and dry. Capillary Refill: Capillary refill takes less than 2 seconds. Neurological:      General: No focal deficit present. Mental Status: She is alert and oriented to person, place, and time. Psychiatric:         Mood and Affect: Mood normal.         Behavior: Behavior normal.        MDM  Number of Diagnoses or Management Options  Hypervolemia, unspecified hypervolemia type  Localized swelling of left upper extremity  Lower extremity edema  Diagnosis management comments: Presenting with multiple complaints, chiefly extremity swelling atraumatically. Ultrasound today without evidence of DVT. Her INR is at goal at 2.1. Chest x-ray revealing chronic airspace disease, no other acute abnormality. No pulmonary edema. Vital signs are all stable. Her troponin is negative. proBNP elevated at 3673. Last baseline to compare to 4 years ago. Spoke to her nephrologist on-call, Dr. Sarah Mckay. Recommended using red bed tonight and calling her dialysis center tomorrow. Discussed my clinical impression(s), any labs and/or radiology results with the patient. I answered any questions and addressed any concerns. Discussed the importance of following up with their primary care physician and/or specialist(s). Discussed signs or symptoms that would warrant return back to the ER for further evaluation.  The patient is agreeable with discharge.        Amount and/or Complexity of Data Reviewed  Clinical lab tests: ordered and reviewed  Tests in the radiology section of CPT®: ordered and reviewed  Decide to obtain previous medical records or to obtain history from someone other than the patient: yes  Discuss the patient with other providers: yes (Dr. Den Santos, ED Attending )           Procedures

## 2022-08-08 NOTE — ED TRIAGE NOTES
Pt reports to ER in wheelchair with  with cc of right leg swelling that has increased last week and also accompanied by pain 10/10. Also reports left arm swelling with pain that started last week and went to [facility] and they performed a doppler study that was negative but swelling has returned. Reports taking oxycodone for pain that is currently ineffective and she reports currently on coumadin. Reports SOB and Chest pain 8 with tightness.

## 2022-08-09 LAB — TROPONIN I BLD-MCNC: <0.04 NG/ML (ref 0–0.08)

## 2022-08-10 LAB
ATRIAL RATE: 75 BPM
CALCULATED P AXIS, ECG09: 35 DEGREES
CALCULATED R AXIS, ECG10: 7 DEGREES
CALCULATED T AXIS, ECG11: 37 DEGREES
DIAGNOSIS, 93000: NORMAL
P-R INTERVAL, ECG05: 180 MS
Q-T INTERVAL, ECG07: 408 MS
QRS DURATION, ECG06: 72 MS
QTC CALCULATION (BEZET), ECG08: 455 MS
VENTRICULAR RATE, ECG03: 75 BPM

## 2022-08-25 ENCOUNTER — OFFICE VISIT (OUTPATIENT)
Dept: FAMILY MEDICINE CLINIC | Age: 67
End: 2022-08-25
Payer: MEDICARE

## 2022-08-25 VITALS
RESPIRATION RATE: 16 BRPM | HEART RATE: 101 BPM | OXYGEN SATURATION: 95 % | BODY MASS INDEX: 42.33 KG/M2 | SYSTOLIC BLOOD PRESSURE: 87 MMHG | TEMPERATURE: 98.2 F | HEIGHT: 70 IN | DIASTOLIC BLOOD PRESSURE: 60 MMHG

## 2022-08-25 DIAGNOSIS — E86.1 HYPOTENSION DUE TO HYPOVOLEMIA: ICD-10-CM

## 2022-08-25 DIAGNOSIS — L30.9 DERMATITIS: ICD-10-CM

## 2022-08-25 DIAGNOSIS — I50.33 ACUTE ON CHRONIC HEART FAILURE WITH PRESERVED EJECTION FRACTION (HCC): ICD-10-CM

## 2022-08-25 DIAGNOSIS — Z79.01 WARFARIN ANTICOAGULATION: Primary | ICD-10-CM

## 2022-08-25 DIAGNOSIS — B37.0 ORAL THRUSH: ICD-10-CM

## 2022-08-25 DIAGNOSIS — I25.118 CORONARY ARTERY DISEASE OF NATIVE ARTERY OF NATIVE HEART WITH STABLE ANGINA PECTORIS (HCC): ICD-10-CM

## 2022-08-25 DIAGNOSIS — I95.89 HYPOTENSION DUE TO HYPOVOLEMIA: ICD-10-CM

## 2022-08-25 DIAGNOSIS — L89.159 PRESSURE INJURY OF SKIN OF SACRAL REGION, UNSPECIFIED INJURY STAGE: ICD-10-CM

## 2022-08-25 LAB
INR BLD: 3.2
PT POC: 38.4 SECONDS
VALID INTERNAL CONTROL?: YES

## 2022-08-25 PROCEDURE — G8510 SCR DEP NEG, NO PLAN REQD: HCPCS | Performed by: FAMILY MEDICINE

## 2022-08-25 PROCEDURE — 1111F DSCHRG MED/CURRENT MED MERGE: CPT | Performed by: FAMILY MEDICINE

## 2022-08-25 PROCEDURE — G0463 HOSPITAL OUTPT CLINIC VISIT: HCPCS | Performed by: FAMILY MEDICINE

## 2022-08-25 PROCEDURE — G8427 DOCREV CUR MEDS BY ELIG CLIN: HCPCS | Performed by: FAMILY MEDICINE

## 2022-08-25 PROCEDURE — 85610 PROTHROMBIN TIME: CPT | Performed by: FAMILY MEDICINE

## 2022-08-25 PROCEDURE — G8417 CALC BMI ABV UP PARAM F/U: HCPCS | Performed by: FAMILY MEDICINE

## 2022-08-25 PROCEDURE — 3017F COLORECTAL CA SCREEN DOC REV: CPT | Performed by: FAMILY MEDICINE

## 2022-08-25 PROCEDURE — G9231 DOC ESRD DIA TRANS PREG: HCPCS | Performed by: FAMILY MEDICINE

## 2022-08-25 PROCEDURE — 99214 OFFICE O/P EST MOD 30 MIN: CPT | Performed by: FAMILY MEDICINE

## 2022-08-25 RX ORDER — NITROGLYCERIN 0.4 MG/1
0.4 TABLET SUBLINGUAL
Qty: 1 EACH | Refills: 1 | Status: SHIPPED | OUTPATIENT
Start: 2022-08-25

## 2022-08-25 RX ORDER — CLOTRIMAZOLE 10 MG/1
10 LOZENGE ORAL; TOPICAL
Qty: 50 TABLET | Refills: 0 | Status: SHIPPED | OUTPATIENT
Start: 2022-08-25 | End: 2022-09-10

## 2022-08-25 RX ORDER — TRIAMCINOLONE ACETONIDE 1 MG/G
OINTMENT TOPICAL 2 TIMES DAILY
Qty: 30 G | Refills: 1 | Status: SHIPPED | OUTPATIENT
Start: 2022-08-25

## 2022-08-25 RX ORDER — MIDODRINE HYDROCHLORIDE 2.5 MG/1
2.5 TABLET ORAL 2 TIMES DAILY
Qty: 60 TABLET | Refills: 0 | Status: SHIPPED | OUTPATIENT
Start: 2022-08-25 | End: 2022-10-06

## 2022-08-25 NOTE — PATIENT INSTRUCTIONS
A Healthy Lifestyle: Care Instructions  Your Care Instructions     A healthy lifestyle can help you feel good, stay at a healthy weight, and have plenty of energy for both work and play. A healthy lifestyle is something you can share with your whole family. A healthy lifestyle also can lower your risk for serious health problems, such as high blood pressure, heart disease, and diabetes. You can follow a few steps listed below to improve your health and the health of your family. Follow-up care is a key part of your treatment and safety. Be sure to make and go to all appointments, and call your doctor if you are having problems. It's also a good idea to know your test results and keep a list of the medicines you take. How can you care for yourself at home? Do not eat too much sugar, fat, or fast foods. You can still have dessert and treats now and then. The goal is moderation. Start small to improve your eating habits. Pay attention to portion sizes, drink less juice and soda pop, and eat more fruits and vegetables. Eat a healthy amount of food. A 3-ounce serving of meat, for example, is about the size of a deck of cards. Fill the rest of your plate with vegetables and whole grains. Limit the amount of soda and sports drinks you have every day. Drink more water when you are thirsty. Eat plenty of fruits and vegetables every day. Have an apple or some carrot sticks as an afternoon snack instead of a candy bar. Try to have fruits and/or vegetables at every meal.  Make exercise part of your daily routine. You may want to start with simple activities, such as walking, bicycling, or slow swimming. Try to be active 30 to 60 minutes every day. You do not need to do all 30 to 60 minutes all at once. For example, you can exercise 3 times a day for 10 or 20 minutes. Moderate exercise is safe for most people, but it is always a good idea to talk to your doctor before starting an exercise program.  Keep moving.  Hanna Torres the lawn, work in the garden, or clean your house. Take the stairs instead of the elevator at work. If you smoke, quit. People who smoke have an increased risk for heart attack, stroke, cancer, and other lung illnesses. Quitting is hard, but there are ways to boost your chance of quitting tobacco for good. Use nicotine gum, patches, or lozenges. Ask your doctor about stop-smoking programs and medicines. Keep trying. In addition to reducing your risk of diseases in the future, you will notice some benefits soon after you stop using tobacco. If you have shortness of breath or asthma symptoms, they will likely get better within a few weeks after you quit. Limit how much alcohol you drink. Moderate amounts of alcohol (up to 2 drinks a day for men, 1 drink a day for women) are okay. But drinking too much can lead to liver problems, high blood pressure, and other health problems. Family health  If you have a family, there are many things you can do together to improve your health. Eat meals together as a family as often as possible. Eat healthy foods. This includes fruits, vegetables, lean meats and dairy, and whole grains. Include your family in your fitness plan. Most people think of activities such as jogging or tennis as the way to fitness, but there are many ways you and your family can be more active. Anything that makes you breathe hard and gets your heart pumping is exercise. Here are some tips:  Walk to do errands or to take your child to school or the bus. Go for a family bike ride after dinner instead of watching TV. Where can you learn more? Go to http://www.gray.com/  Enter E394 in the search box to learn more about \"A Healthy Lifestyle: Care Instructions. \"  Current as of: June 16, 2021               Content Version: 13.2  © 8257-1422 [employer].    Care instructions adapted under license by [employer] (which disclaims liability or warranty for this information). If you have questions about a medical condition or this instruction, always ask your healthcare professional. [place] [address] any warranty or liability for your use of this information.

## 2022-08-25 NOTE — PROGRESS NOTES
Chief Complaint   Patient presents with    Hospital Follow Up              Patient presents in office for SEE follow up from [facility].  Was admitted on 8/12/22 for CHF. Discharged on 8/22/22. States that she is feeling extremely weak. Also here for INR check. No other concerns. 1. Have you been to the ER, urgent care clinic since your last visit? Hospitalized since your last visit? Yes When: 8/12/22 Where: [facility] Reason for visit: CHF    2. Have you seen or consulted any other health care providers outside of the [address] [place] [address] since your last visit? Include any pap smears or colon screening.  No    Learning Assessment 6/28/2019   PRIMARY LEARNER Patient   PRIMARY LANGUAGE ENGLISH   LEARNER PREFERENCE PRIMARY LISTENING   ANSWERED BY patient    RELATIONSHIP SELF

## 2022-08-25 NOTE — PROGRESS NOTES
Progress Note    she is a 61y.o. year old female who presents for evalution. Subjective:     Pt here for f/up from CHF exacerbation and states she had quite a bit of fluid removed thru hemo and peritoneal.  Imdur was increased but she did not increase. She has not taken the coreg since yesterday due to low BP. INR is a little high today at 3.2. Reports having thrush and using nystatin. Reports having sores on her buttocks. Having issues with hemorrhoids and diarrhea. No recent abx.  reports having a lot of itching around her neck and has been scratching it quite a bit. Reviewed PmHx, RxHx, FmHx, SocHx, AllgHx and updated and dated in the chart. Review of Systems - negative except as listed above in the HPI    Objective:     Vitals:    08/25/22 1501   BP: (!) 87/60   Pulse: (!) 101   Resp: 16   Temp: 98.2 °F (36.8 °C)   TempSrc: Oral   SpO2: 95%   Height: 5' 10\" (1.778 m)       Current Outpatient Medications   Medication Sig    clotrimazole (MYCELEX) 10 mg kevin Take 1 Tablet by mouth five (5) times daily for 10 days. midodrine (PROAMATINE) 2.5 mg tablet Take 1 Tablet by mouth two (2) times a day for 30 days. nitroglycerin (NITROSTAT) 0.4 mg SL tablet 1 Tablet by SubLINGual route every five (5) minutes as needed for Chest Pain. Up to 3 doses. triamcinolone acetonide (KENALOG) 0.1 % ointment Apply  to affected area two (2) times a day. use thin layer    [START ON 9/9/2022] oxyCODONE-acetaminophen (PERCOCET 10)  mg per tablet Take 1 Tablet by mouth every eight (8) hours as needed for Pain for up to 30 days. Max Daily Amount: 3 Tablets. folic acid (FOLVITE) 1 mg tablet Take 1 Tablet by mouth in the morning. potassium chloride (K-DUR, KLOR-CON M20) 20 mEq tablet Take 1 Tablet by mouth in the morning. warfarin (COUMADIN) 1 mg tablet Take 1 mg by mouth every Monday, Wednesday, Friday.  Alternate with 2.5mg on tues, thurs, sat    carvediloL (COREG) 6.25 mg tablet Take 1 Tablet by mouth two (2) times daily (with meals). ondansetron (ZOFRAN ODT) 4 mg disintegrating tablet Take 1 Tablet by mouth every eight (8) hours as needed for Nausea or Vomiting.    isosorbide mononitrate ER (IMDUR) 120 mg CR tablet Take 0.5 Tablets by mouth every morning. atorvastatin (LIPITOR) 80 mg tablet TAKE 1 TABLET BY MOUTH ONCE DAILY NIGHTLY    allopurinoL (ZYLOPRIM) 100 mg tablet Take 1 tablet by mouth once daily    CALCITRIOL PO Take 0.5 mg by mouth.    sucralfate (CARAFATE) 1 gram tablet Take 1 tablet by mouth 4 times daily    albuterol (PROAIR HFA) 90 mcg/actuation inhaler Take 2 Puffs by inhalation every four (4) hours as needed for Wheezing. Oxygen O2 2L NC 24/7    docusate sodium (STOOL SOFTENER PO) Take  by mouth daily. (Patient not taking: No sig reported)     No current facility-administered medications for this visit. Physical Examination: General appearance - chronically ill appearing  Skin - excoriated lesions on back      Assessment/ Plan:   Diagnoses and all orders for this visit:    1. Warfarin anticoagulation  -     AMB POC PT/INR  Hold 2 days then restart usual follow up 3 weeks. 2. Oral thrush  -     clotrimazole (MYCELEX) 10 mg kevin; Take 1 Tablet by mouth five (5) times daily for 10 days. 3. Pressure injury of skin of sacral region, unspecified injury stage  -     REFERRAL TO [address]    4. Coronary artery disease of native artery of native heart with stable angina pectoris ([facility]  -     REFERRAL TO HOME HEALTH    5. Acute on chronic heart failure with preserved ejection fraction ([facility] [facility]  -     REFERRAL TO HOME HEALTH    6. Hypotension due to hypovolemia  -     midodrine (PROAMATINE) 2.5 mg tablet; Take 1 Tablet by mouth two (2) times a day for 30 days. 7. Dermatitis  -     triamcinolone acetonide (KENALOG) 0.1 % ointment; Apply  to affected area two (2) times a day.  use thin layer    Other orders  -     nitroglycerin (NITROSTAT) 0.4 mg SL tablet; 1 Tablet by SubLINGual route every five (5) minutes as needed for Chest Pain. Up to 3 doses. Follow-up and Dispositions    Return in about 3 weeks (around 9/15/2022), or if symptoms worsen or fail to improve. I have discussed the diagnosis with the patient and the intended plan as seen in the above orders. The patient has received an after-visit summary and questions were answered concerning future plans. Pt conveyed understanding of plan.     Medication Side Effects and Warnings were discussed with patient    An electronic signature was used to authenticate this note  Jose Berrios DO

## 2022-09-01 ENCOUNTER — APPOINTMENT (OUTPATIENT)
Dept: CT IMAGING | Age: 67
DRG: 073 | End: 2022-09-01
Attending: EMERGENCY MEDICINE
Payer: MEDICARE

## 2022-09-01 ENCOUNTER — HOSPITAL ENCOUNTER (INPATIENT)
Age: 67
LOS: 9 days | Discharge: HOME HEALTH | DRG: 073 | End: 2022-09-10
Attending: EMERGENCY MEDICINE | Admitting: FAMILY MEDICINE
Payer: MEDICARE

## 2022-09-01 ENCOUNTER — DOCUMENTATION ONLY (OUTPATIENT)
Dept: FAMILY MEDICINE CLINIC | Age: 67
End: 2022-09-01

## 2022-09-01 DIAGNOSIS — J84.9 ILD (INTERSTITIAL LUNG DISEASE) (HCC): ICD-10-CM

## 2022-09-01 DIAGNOSIS — D63.1 ANEMIA DUE TO CHRONIC KIDNEY DISEASE, ON CHRONIC DIALYSIS (HCC): ICD-10-CM

## 2022-09-01 DIAGNOSIS — K21.9 GASTROESOPHAGEAL REFLUX DISEASE, UNSPECIFIED WHETHER ESOPHAGITIS PRESENT: ICD-10-CM

## 2022-09-01 DIAGNOSIS — G47.33 OBSTRUCTIVE SLEEP APNEA SYNDROME: ICD-10-CM

## 2022-09-01 DIAGNOSIS — R10.13 EPIGASTRIC PAIN: ICD-10-CM

## 2022-09-01 DIAGNOSIS — Z99.2 ANEMIA DUE TO CHRONIC KIDNEY DISEASE, ON CHRONIC DIALYSIS (HCC): ICD-10-CM

## 2022-09-01 DIAGNOSIS — N18.6 ESRD ON PERITONEAL DIALYSIS (HCC): ICD-10-CM

## 2022-09-01 DIAGNOSIS — R11.2 INTRACTABLE VOMITING WITH NAUSEA: ICD-10-CM

## 2022-09-01 DIAGNOSIS — Z99.2 ESRD ON PERITONEAL DIALYSIS (HCC): ICD-10-CM

## 2022-09-01 DIAGNOSIS — Z99.89 OSA ON CPAP: Chronic | ICD-10-CM

## 2022-09-01 DIAGNOSIS — E87.6 HYPOKALEMIA: ICD-10-CM

## 2022-09-01 DIAGNOSIS — Z79.01 WARFARIN ANTICOAGULATION: ICD-10-CM

## 2022-09-01 DIAGNOSIS — R79.1 SUPRATHERAPEUTIC INR: ICD-10-CM

## 2022-09-01 DIAGNOSIS — G47.33 OSA ON CPAP: Chronic | ICD-10-CM

## 2022-09-01 DIAGNOSIS — R11.2 NAUSEA AND VOMITING, UNSPECIFIED VOMITING TYPE: Primary | ICD-10-CM

## 2022-09-01 DIAGNOSIS — E11.43 DIABETIC GASTROPARESIS (HCC): ICD-10-CM

## 2022-09-01 DIAGNOSIS — I50.30 HEART FAILURE WITH PRESERVED EJECTION FRACTION, UNSPECIFIED HF CHRONICITY (HCC): ICD-10-CM

## 2022-09-01 DIAGNOSIS — E87.1 HYPONATREMIA: ICD-10-CM

## 2022-09-01 DIAGNOSIS — K31.84 DIABETIC GASTROPARESIS (HCC): ICD-10-CM

## 2022-09-01 DIAGNOSIS — I10 PRIMARY HYPERTENSION: ICD-10-CM

## 2022-09-01 DIAGNOSIS — I25.118 CORONARY ARTERY DISEASE OF NATIVE HEART WITH STABLE ANGINA PECTORIS, UNSPECIFIED VESSEL OR LESION TYPE (HCC): ICD-10-CM

## 2022-09-01 DIAGNOSIS — E11.49 DM TYPE 2 CAUSING NEUROLOGICAL DISEASE (HCC): ICD-10-CM

## 2022-09-01 DIAGNOSIS — R07.9 CHEST PAIN, UNSPECIFIED TYPE: ICD-10-CM

## 2022-09-01 DIAGNOSIS — N18.6 ANEMIA DUE TO CHRONIC KIDNEY DISEASE, ON CHRONIC DIALYSIS (HCC): ICD-10-CM

## 2022-09-01 DIAGNOSIS — Z86.718 HX OF DEEP VENOUS THROMBOSIS: ICD-10-CM

## 2022-09-01 LAB
ALBUMIN SERPL-MCNC: 2.6 G/DL (ref 3.5–5)
ALBUMIN/GLOB SERPL: 0.6 {RATIO} (ref 1.1–2.2)
ALP SERPL-CCNC: 107 U/L (ref 45–117)
ALT SERPL-CCNC: 14 U/L (ref 12–78)
ANION GAP SERPL CALC-SCNC: 12 MMOL/L (ref 5–15)
AST SERPL-CCNC: 9 U/L (ref 15–37)
BASOPHILS # BLD: 0 K/UL (ref 0–0.1)
BASOPHILS NFR BLD: 0 % (ref 0–1)
BILIRUB SERPL-MCNC: 0.5 MG/DL (ref 0.2–1)
BUN SERPL-MCNC: 56 MG/DL (ref 6–20)
BUN/CREAT SERPL: 5 (ref 12–20)
CALCIUM SERPL-MCNC: 9.1 MG/DL (ref 8.5–10.1)
CHLORIDE SERPL-SCNC: 92 MMOL/L (ref 97–108)
CO2 SERPL-SCNC: 27 MMOL/L (ref 21–32)
COMMENT, HOLDF: NORMAL
CREAT SERPL-MCNC: 10.5 MG/DL (ref 0.55–1.02)
DIFFERENTIAL METHOD BLD: ABNORMAL
EOSINOPHIL # BLD: 0.4 K/UL (ref 0–0.4)
EOSINOPHIL NFR BLD: 3 % (ref 0–7)
ERYTHROCYTE [DISTWIDTH] IN BLOOD BY AUTOMATED COUNT: 14.8 % (ref 11.5–14.5)
GLOBULIN SER CALC-MCNC: 4.2 G/DL (ref 2–4)
GLUCOSE SERPL-MCNC: 126 MG/DL (ref 65–100)
HCT VFR BLD AUTO: 28.7 % (ref 35–47)
HGB BLD-MCNC: 9.3 G/DL (ref 11.5–16)
IMM GRANULOCYTES # BLD AUTO: 0.1 K/UL (ref 0–0.04)
IMM GRANULOCYTES NFR BLD AUTO: 1 % (ref 0–0.5)
LIPASE SERPL-CCNC: 662 U/L (ref 73–393)
LYMPHOCYTES # BLD: 1.8 K/UL (ref 0.8–3.5)
LYMPHOCYTES NFR BLD: 14 % (ref 12–49)
MCH RBC QN AUTO: 31.3 PG (ref 26–34)
MCHC RBC AUTO-ENTMCNC: 32.4 G/DL (ref 30–36.5)
MCV RBC AUTO: 96.6 FL (ref 80–99)
MONOCYTES # BLD: 1 K/UL (ref 0–1)
MONOCYTES NFR BLD: 7 % (ref 5–13)
NEUTS SEG # BLD: 10 K/UL (ref 1.8–8)
NEUTS SEG NFR BLD: 75 % (ref 32–75)
NRBC # BLD: 0 K/UL (ref 0–0.01)
NRBC BLD-RTO: 0 PER 100 WBC
PLATELET # BLD AUTO: 242 K/UL (ref 150–400)
PMV BLD AUTO: 10.7 FL (ref 8.9–12.9)
POTASSIUM SERPL-SCNC: 2.8 MMOL/L (ref 3.5–5.1)
PROT SERPL-MCNC: 6.8 G/DL (ref 6.4–8.2)
RBC # BLD AUTO: 2.97 M/UL (ref 3.8–5.2)
SAMPLES BEING HELD,HOLD: NORMAL
SODIUM SERPL-SCNC: 131 MMOL/L (ref 136–145)
WBC # BLD AUTO: 13.3 K/UL (ref 3.6–11)

## 2022-09-01 PROCEDURE — 96361 HYDRATE IV INFUSION ADD-ON: CPT

## 2022-09-01 PROCEDURE — 65270000029 HC RM PRIVATE

## 2022-09-01 PROCEDURE — 96374 THER/PROPH/DIAG INJ IV PUSH: CPT

## 2022-09-01 PROCEDURE — 74011250636 HC RX REV CODE- 250/636

## 2022-09-01 PROCEDURE — 80053 COMPREHEN METABOLIC PANEL: CPT

## 2022-09-01 PROCEDURE — 74011250636 HC RX REV CODE- 250/636: Performed by: EMERGENCY MEDICINE

## 2022-09-01 PROCEDURE — 36415 COLL VENOUS BLD VENIPUNCTURE: CPT

## 2022-09-01 PROCEDURE — 74176 CT ABD & PELVIS W/O CONTRAST: CPT

## 2022-09-01 PROCEDURE — 85025 COMPLETE CBC W/AUTO DIFF WBC: CPT

## 2022-09-01 PROCEDURE — 99285 EMERGENCY DEPT VISIT HI MDM: CPT

## 2022-09-01 PROCEDURE — 94761 N-INVAS EAR/PLS OXIMETRY MLT: CPT

## 2022-09-01 PROCEDURE — 74011250637 HC RX REV CODE- 250/637

## 2022-09-01 PROCEDURE — 83690 ASSAY OF LIPASE: CPT

## 2022-09-01 RX ORDER — ACETAMINOPHEN 325 MG/1
650 TABLET ORAL
Status: DISCONTINUED | OUTPATIENT
Start: 2022-09-01 | End: 2022-09-10 | Stop reason: HOSPADM

## 2022-09-01 RX ORDER — ONDANSETRON 2 MG/ML
4 INJECTION INTRAMUSCULAR; INTRAVENOUS
Status: DISCONTINUED | OUTPATIENT
Start: 2022-09-01 | End: 2022-09-02

## 2022-09-01 RX ORDER — ONDANSETRON 2 MG/ML
4 INJECTION INTRAMUSCULAR; INTRAVENOUS
Status: COMPLETED | OUTPATIENT
Start: 2022-09-01 | End: 2022-09-01

## 2022-09-01 RX ORDER — SODIUM CHLORIDE 0.9 % (FLUSH) 0.9 %
5-40 SYRINGE (ML) INJECTION AS NEEDED
Status: DISCONTINUED | OUTPATIENT
Start: 2022-09-01 | End: 2022-09-10 | Stop reason: HOSPADM

## 2022-09-01 RX ORDER — CALCITRIOL 0.25 UG/1
0.5 CAPSULE ORAL DAILY
Status: DISCONTINUED | OUTPATIENT
Start: 2022-09-02 | End: 2022-09-10 | Stop reason: HOSPADM

## 2022-09-01 RX ORDER — MIDODRINE HYDROCHLORIDE 5 MG/1
2.5 TABLET ORAL 2 TIMES DAILY
Status: DISCONTINUED | OUTPATIENT
Start: 2022-09-02 | End: 2022-09-10 | Stop reason: HOSPADM

## 2022-09-01 RX ORDER — POTASSIUM CHLORIDE 750 MG/1
20 TABLET, FILM COATED, EXTENDED RELEASE ORAL DAILY
Refills: 0 | Status: DISCONTINUED | OUTPATIENT
Start: 2022-09-02 | End: 2022-09-10 | Stop reason: HOSPADM

## 2022-09-01 RX ORDER — IPRATROPIUM BROMIDE AND ALBUTEROL SULFATE 2.5; .5 MG/3ML; MG/3ML
3 SOLUTION RESPIRATORY (INHALATION)
Refills: 5 | Status: DISCONTINUED | OUTPATIENT
Start: 2022-09-01 | End: 2022-09-10 | Stop reason: HOSPADM

## 2022-09-01 RX ORDER — ALLOPURINOL 100 MG/1
100 TABLET ORAL DAILY
Status: DISCONTINUED | OUTPATIENT
Start: 2022-09-02 | End: 2022-09-10 | Stop reason: HOSPADM

## 2022-09-01 RX ORDER — POTASSIUM CHLORIDE 7.45 MG/ML
10 INJECTION INTRAVENOUS ONCE
Status: COMPLETED | OUTPATIENT
Start: 2022-09-01 | End: 2022-09-02

## 2022-09-01 RX ORDER — FOLIC ACID 1 MG/1
1 TABLET ORAL DAILY
Status: DISCONTINUED | OUTPATIENT
Start: 2022-09-02 | End: 2022-09-10 | Stop reason: HOSPADM

## 2022-09-01 RX ORDER — ACETAMINOPHEN 650 MG/1
650 SUPPOSITORY RECTAL
Status: DISCONTINUED | OUTPATIENT
Start: 2022-09-01 | End: 2022-09-10 | Stop reason: HOSPADM

## 2022-09-01 RX ORDER — ONDANSETRON 4 MG/1
4 TABLET, ORALLY DISINTEGRATING ORAL
Status: DISCONTINUED | OUTPATIENT
Start: 2022-09-01 | End: 2022-09-02

## 2022-09-01 RX ORDER — POLYETHYLENE GLYCOL 3350 17 G/17G
17 POWDER, FOR SOLUTION ORAL DAILY PRN
Status: DISCONTINUED | OUTPATIENT
Start: 2022-09-01 | End: 2022-09-05

## 2022-09-01 RX ORDER — WARFARIN 2.5 MG/1
2.5 TABLET ORAL
COMMUNITY
End: 2022-09-10

## 2022-09-01 RX ORDER — SODIUM CHLORIDE 9 MG/ML
25 INJECTION, SOLUTION INTRAVENOUS CONTINUOUS
Status: DISCONTINUED | OUTPATIENT
Start: 2022-09-01 | End: 2022-09-04

## 2022-09-01 RX ORDER — SUCRALFATE 1 G/1
1 TABLET ORAL
Status: DISCONTINUED | OUTPATIENT
Start: 2022-09-02 | End: 2022-09-10 | Stop reason: HOSPADM

## 2022-09-01 RX ORDER — ATORVASTATIN CALCIUM 20 MG/1
80 TABLET, FILM COATED ORAL
Status: DISCONTINUED | OUTPATIENT
Start: 2022-09-01 | End: 2022-09-10 | Stop reason: HOSPADM

## 2022-09-01 RX ORDER — SODIUM CHLORIDE 0.9 % (FLUSH) 0.9 %
5-40 SYRINGE (ML) INJECTION EVERY 8 HOURS
Status: DISCONTINUED | OUTPATIENT
Start: 2022-09-01 | End: 2022-09-10 | Stop reason: HOSPADM

## 2022-09-01 RX ADMIN — ONDANSETRON 4 MG: 2 INJECTION INTRAMUSCULAR; INTRAVENOUS at 17:24

## 2022-09-01 RX ADMIN — SODIUM CHLORIDE 500 ML: 9 INJECTION, SOLUTION INTRAVENOUS at 17:24

## 2022-09-01 RX ADMIN — SODIUM CHLORIDE 50 ML/HR: 9 INJECTION, SOLUTION INTRAVENOUS at 22:48

## 2022-09-01 RX ADMIN — ATORVASTATIN CALCIUM 80 MG: 20 TABLET, FILM COATED ORAL at 22:53

## 2022-09-01 RX ADMIN — ONDANSETRON 4 MG: 2 INJECTION INTRAMUSCULAR; INTRAVENOUS at 21:14

## 2022-09-01 RX ADMIN — POTASSIUM CHLORIDE 10 MEQ: 7.46 INJECTION, SOLUTION INTRAVENOUS at 22:53

## 2022-09-01 NOTE — ED NOTES
Bedside and Verbal shift change report given to Gilbert Green (oncoming nurse) by Rajni EVANS (offgoing nurse). Report included the following information SBAR, Kardex, ED Summary, Intake/Output, and Recent Results.

## 2022-09-01 NOTE — ED TRIAGE NOTES
Pt. Yessica Packil in by EMS for NV weakness, was just dc from [facility] after being admitted x 2 weeks for heart failure, states they took 40 lbs of fluid off of her in 2 weeks, states is a [employer] patient. Pt. Wears 2 liters NC all the time. Denies diarrhea, has been constipated.

## 2022-09-01 NOTE — ED PROVIDER NOTES
The history is provided by the patient. Vomiting   This is a new problem. The current episode started more than 1 week ago (2 weeks ago  while admitted for CHF and fluid overload to [facility]). The problem occurs continuously (unable to keep down food or water). The emesis has an appearance of stomach contents. There has been no fever. Associated symptoms include abdominal pain. Pertinent negatives include no diarrhea. Fatigue  Associated symptoms include vomiting and nausea. Past Medical History:   Diagnosis Date     Sleep Apnea 2/16/2011    Compliant with c-pap. Aortic aneurysm ([facility])     Abdominal    CAD (coronary Artery Disease) Native Artery 2/16/2011    Chronic kidney disease     Hemodiaylsis  3x/week    Chronic pain     CKD (chronic kidney disease) stage 4, GFR 15-29 ml/min ([facility]) 2/10/2017    Diabetic gastroparesis ([facility])     Diastolic heart failure ([address] 10/5/2012    DM type 2 causing neurological disease ([address]     DM type 2 causing renal disease ([facility])     Insulin SS at night only PRN    DM type 2 causing vascular disease ([address]     Esophageal stricture 2012    dialted Dr. Salvador Gracia    G6PD deficiency     trait    GERD (gastroesophageal reflux disease)     Gout     Heart failure ([address]     Hemodialysis access, AV graft ([address] 04/02/2017    Dialysis MWF    [address]      Hypertension     ILD (interstitial lung disease) ([address]     open lung bx CJW 2010    Infected dental caries 3/17/2020    Infection of total right knee replacement ([address] 3/17/2020    Loss of appetite 3/17/2020    Morbid obesity ([address]     OA (osteoarthritis)     Ovarian cancer ([address]     cervical and uterine    Rheumatoid arteritis ([facility])     S/P left pulmonary artery pressure sensor implant placement 8/31/2017    Cardiomems     Thromboembolus ([address]     Right calf     Tobacco use disorder 3/21/2012    Uterine cervix cancer ([address]     Vitamin D deficiency 8/9/2013       Past Surgical History:   Procedure Laterality Date COLONOSCOPY N/A 2016    COLONOSCOPY performed by Rossy Forde MD at [facility].      bilateral    HX CHOLECYSTECTOMY      HX HEART CATHETERIZATION      x 7    HX HERNIA REPAIR      HX HYSTERECTOMY      HX ORTHOPAEDIC Right 12    right knee replacement    HX ORTHOPAEDIC Bilateral     carpal tunnel repair    HX ORTHOPAEDIC Right     PLATE IN ANKLE DUE TO FRACTURE    HX SALPINGO-OOPHORECTOMY      HX TONSIL AND ADENOIDECTOMY      HX TONSILLECTOMY      HX VASCULAR ACCESS Left     Left lower arm AV fistula    IR INSERT PERITONEAL VENOUS SHUNT      PA CARDIAC SURG PROCEDURE UNLIST  02/2019    x4 with last sttent placed 2019.     PA CHEST SURGERY PROCEDURE UNLISTED Right     > 20 years ago  RLL removed     UPPER GI ENDOSCOPY,DILATN W GUIDE  2016              Family History:   Problem Relation Age of Onset    Heart Disease Mother     No Known Problems Daughter     No Known Problems Son     No Known Problems Son     Anesth Problems Neg Hx        Social History     Socioeconomic History    Marital status:      Spouse name: Not on file    Number of children: Not on file    Years of education: Not on file    Highest education level: Not on file   Occupational History    Not on file   Tobacco Use    Smoking status: Former     Packs/day: 0.50     Years: 41.00     Pack years: 20.50     Types: Cigarettes     Quit date: 2014     Years since quittin.8    Smokeless tobacco: Never   Vaping Use    Vaping Use: Never used   Substance and Sexual Activity    Alcohol use: No    Drug use: No    Sexual activity: Not on file   Other Topics Concern    Not on file   Social History Narrative    Not on file     Social Determinants of Health     Financial Resource Strain: Low Risk     Difficulty of Paying Living Expenses: Not hard at all   Food Insecurity: No Food Insecurity    Worried About [address] in the Last Year: Never true Ran Out of Food in the Last Year: Never true   Transportation Needs: Not on file   Physical Activity: Not on file   Stress: Not on file   Social Connections: Not on file   Intimate Partner Violence: Not on file   Housing Stability: Not on file         ALLERGIES: Contrast dye [iodine], Shellfish derived, Levaquin [levofloxacin], Morphine, and Nsaids (non-steroidal anti-inflammatory drug)    Review of Systems   Constitutional:  Positive for fatigue. Gastrointestinal:  Positive for abdominal pain, constipation, nausea and vomiting. Negative for diarrhea. All other systems reviewed and are negative. Vitals:    22 1803 22 1807 22   BP: (!) 153/40 (!) 141/63     Pulse: 97 87     Resp: 21 12     Temp:       SpO2:   100%    Weight:    122.5 kg (270 lb)   Height:    5' 10\" (1.778 m)            Physical Exam  Vitals and nursing note reviewed. Constitutional:       Appearance: She is well-developed. She is ill-appearing. HENT:      Head: Normocephalic and atraumatic. Eyes:      General: No scleral icterus. Cardiovascular:      Rate and Rhythm: Normal rate. Pulmonary:      Effort: Pulmonary effort is normal.   Abdominal:      General: There is no distension. Musculoskeletal:      Cervical back: Normal range of motion. Skin:     Findings: No erythema or rash. Neurological:      General: No focal deficit present. Mental Status: She is alert and oriented to person, place, and time.    Psychiatric:         Mood and Affect: Mood normal.         Behavior: Behavior normal.        MDM         Procedures      MEDICAL DECISION MAKIN y.o. female presents with Nausea, Vomiting, and Fatigue    Differential diagnosis includes but not limited to: Dehydration, electrolyte abnormality, sepsis, pancreatitis, DKA    LABORATORY TESTS:  Labs Reviewed   METABOLIC PANEL, COMPREHENSIVE - Abnormal; Notable for the following components:       Result Value    Sodium 131 (*) Potassium 2.8 (*)     Chloride 92 (*)     Glucose 126 (*)     BUN 56 (*)     Creatinine 10.50 (*)     BUN/Creatinine ratio 5 (*)     GFR est AA 4 (*)     GFR est non-AA 4 (*)     AST (SGOT) 9 (*)     Albumin 2.6 (*)     Globulin 4.2 (*)     A-G Ratio 0.6 (*)     All other components within normal limits   CBC WITH AUTOMATED DIFF - Abnormal; Notable for the following components:    WBC 13.3 (*)     RBC 2.97 (*)     HGB 9.3 (*)     HCT 28.7 (*)     RDW 14.8 (*)     IMMATURE GRANULOCYTES 1 (*)     ABS. NEUTROPHILS 10.0 (*)     ABS. IMM. GRANS. 0.1 (*)     All other components within normal limits   LIPASE - Abnormal; Notable for the following components:    Lipase 662 (*)     All other components within normal limits   SAMPLES BEING HELD       IMAGING RESULTS:  CT ABD PELV WO CONT   Final Result   1. Increasing moderate volume ascites. Right abdominal peritoneal catheter. 2.  Grossly unchanged right mid renal indeterminate lesion in several additional   bilateral likely cystic lesions as described above. 3.  Bilateral nonobstructive nephrolithiasis. 4.  Cholecystectomy. 5.  Evidence of chronic interstitial lung disease. MEDICATIONS GIVEN:  Medications   ondansetron (ZOFRAN) injection 4 mg (has no administration in time range)   sodium chloride 0.9 % bolus infusion 500 mL (0 mL IntraVENous IV Completed 9/1/22 1852)   ondansetron (ZOFRAN) injection 4 mg (4 mg IntraVENous Given 9/1/22 1724)       PROGRESS NOTE:   8:51 PM Patient's symptoms have not improved    CONSULTS:  Family Practice:  [address] [place] [address] for Admission  8:51 PM    ED Room Number: ER10/10  Patient Name and age:  Zeina Lawrence 61 y.o.  female  Working Diagnosis:   1.  Nausea and vomiting, unspecified vomiting type        COVID-19 Suspicion:  no  Sepsis present:  no  Reassessment needed: no  Code Status:  Full Code  Readmission: yes  Isolation Requirements:  no  Recommended Level of Care:  telemetry  Department:[facility] ED - [phone]      Gonzalez Edmondson MD      Please note that this dictation was completed with [employer], the computer voice recognition software. Quite often unanticipated grammatical, syntax, homophones, and other interpretive errors are inadvertently transcribed by the computer software. Please disregard these errors. Please excuse any errors that have escaped final proofreading.

## 2022-09-02 ENCOUNTER — APPOINTMENT (OUTPATIENT)
Dept: NON INVASIVE DIAGNOSTICS | Age: 67
DRG: 073 | End: 2022-09-02
Payer: MEDICARE

## 2022-09-02 PROBLEM — R79.1 SUPRATHERAPEUTIC INR: Status: ACTIVE | Noted: 2022-09-02

## 2022-09-02 PROBLEM — E87.1 HYPONATREMIA: Status: ACTIVE | Noted: 2022-09-02

## 2022-09-02 PROBLEM — R11.2 INTRACTABLE VOMITING WITH NAUSEA: Status: ACTIVE | Noted: 2022-09-02

## 2022-09-02 LAB
ANION GAP SERPL CALC-SCNC: 13 MMOL/L (ref 5–15)
ATRIAL RATE: 103 BPM
BASOPHILS # BLD: 0 K/UL (ref 0–0.1)
BASOPHILS NFR BLD: 0 % (ref 0–1)
BUN SERPL-MCNC: 56 MG/DL (ref 6–20)
BUN/CREAT SERPL: 5 (ref 12–20)
CALCIUM SERPL-MCNC: 8.8 MG/DL (ref 8.5–10.1)
CALCULATED P AXIS, ECG09: 42 DEGREES
CALCULATED R AXIS, ECG10: 8 DEGREES
CALCULATED T AXIS, ECG11: 55 DEGREES
CHLORIDE SERPL-SCNC: 95 MMOL/L (ref 97–108)
CO2 SERPL-SCNC: 25 MMOL/L (ref 21–32)
CREAT SERPL-MCNC: 10.5 MG/DL (ref 0.55–1.02)
DIAGNOSIS, 93000: NORMAL
DIFFERENTIAL METHOD BLD: ABNORMAL
ECHO AO ARCH DIAM: 1.8 CM
ECHO AO ASC DIAM: 3.1 CM
ECHO AO ASCENDING AORTA INDEX: 1.31 CM/M2
ECHO AO SINUS VALSALVA DIAM: 3.8 CM
ECHO AO SINUS VALSALVA INDEX: 1.6 CM/M2
ECHO AV PEAK GRADIENT: 11 MMHG
ECHO AV PEAK VELOCITY: 1.7 M/S
ECHO LA DIAMETER INDEX: 1.65 CM/M2
ECHO LA DIAMETER: 3.9 CM
ECHO LA VOL 2C: 15 ML (ref 22–52)
ECHO LA VOL 4C: 19 ML (ref 22–52)
ECHO LA VOL BP: 17 ML (ref 22–52)
ECHO LA VOL/BSA BIPLANE: 7 ML/M2 (ref 16–34)
ECHO LA VOLUME AREA LENGTH: 18 ML
ECHO LA VOLUME INDEX A2C: 6 ML/M2 (ref 16–34)
ECHO LA VOLUME INDEX A4C: 8 ML/M2 (ref 16–34)
ECHO LA VOLUME INDEX AREA LENGTH: 8 ML/M2 (ref 16–34)
ECHO LV E' LATERAL VELOCITY: 7 CM/S
ECHO LV E' SEPTAL VELOCITY: 6 CM/S
ECHO LV FRACTIONAL SHORTENING: 22 % (ref 28–44)
ECHO LV INTERNAL DIMENSION DIASTOLE INDEX: 1.73 CM/M2
ECHO LV INTERNAL DIMENSION DIASTOLIC: 4.1 CM (ref 3.9–5.3)
ECHO LV INTERNAL DIMENSION SYSTOLIC INDEX: 1.35 CM/M2
ECHO LV INTERNAL DIMENSION SYSTOLIC: 3.2 CM
ECHO LV IVSD: 1.1 CM (ref 0.6–0.9)
ECHO LV MASS 2D: 141.5 G (ref 67–162)
ECHO LV MASS INDEX 2D: 59.7 G/M2 (ref 43–95)
ECHO LV POSTERIOR WALL DIASTOLIC: 1 CM (ref 0.6–0.9)
ECHO LV RELATIVE WALL THICKNESS RATIO: 0.49
ECHO LVOT AREA: 3.5 CM2
ECHO LVOT DIAM: 2.1 CM
ECHO MV A VELOCITY: 1.24 M/S
ECHO MV E DECELERATION TIME (DT): 406.1 MS
ECHO MV E VELOCITY: 0.62 M/S
ECHO MV E/A RATIO: 0.5
ECHO MV E/E' LATERAL: 8.86
ECHO MV E/E' RATIO (AVERAGED): 9.6
ECHO MV E/E' SEPTAL: 10.33
ECHO PV MAX VELOCITY: 1.2 M/S
ECHO PV PEAK GRADIENT: 6 MMHG
ECHO RV FREE WALL PEAK S': 19 CM/S
ECHO RV INTERNAL DIMENSION: 3.1 CM
ECHO RV TAPSE: 1.1 CM (ref 1.7–?)
ECHO TV REGURGITANT MAX VELOCITY: 2.19 M/S
ECHO TV REGURGITANT PEAK GRADIENT: 19 MMHG
EOSINOPHIL # BLD: 0.4 K/UL (ref 0–0.4)
EOSINOPHIL NFR BLD: 3 % (ref 0–7)
ERYTHROCYTE [DISTWIDTH] IN BLOOD BY AUTOMATED COUNT: 14.6 % (ref 11.5–14.5)
GLUCOSE SERPL-MCNC: 123 MG/DL (ref 65–100)
HCT VFR BLD AUTO: 26.6 % (ref 35–47)
HGB BLD-MCNC: 8.6 G/DL (ref 11.5–16)
IMM GRANULOCYTES # BLD AUTO: 0.1 K/UL (ref 0–0.04)
IMM GRANULOCYTES NFR BLD AUTO: 1 % (ref 0–0.5)
INR PPP: 8.5 (ref 0.9–1.1)
LYMPHOCYTES # BLD: 2.1 K/UL (ref 0.8–3.5)
LYMPHOCYTES NFR BLD: 17 % (ref 12–49)
MCH RBC QN AUTO: 31.9 PG (ref 26–34)
MCHC RBC AUTO-ENTMCNC: 32.3 G/DL (ref 30–36.5)
MCV RBC AUTO: 98.5 FL (ref 80–99)
MONOCYTES # BLD: 0.9 K/UL (ref 0–1)
MONOCYTES NFR BLD: 7 % (ref 5–13)
NEUTS SEG # BLD: 9 K/UL (ref 1.8–8)
NEUTS SEG NFR BLD: 72 % (ref 32–75)
NRBC # BLD: 0 K/UL (ref 0–0.01)
NRBC BLD-RTO: 0 PER 100 WBC
P-R INTERVAL, ECG05: 208 MS
PLATELET # BLD AUTO: 234 K/UL (ref 150–400)
PMV BLD AUTO: 11.7 FL (ref 8.9–12.9)
POTASSIUM SERPL-SCNC: 2.9 MMOL/L (ref 3.5–5.1)
PROTHROMBIN TIME: 77.9 SEC (ref 9–11.1)
Q-T INTERVAL, ECG07: 364 MS
QRS DURATION, ECG06: 72 MS
QTC CALCULATION (BEZET), ECG08: 476 MS
RBC # BLD AUTO: 2.7 M/UL (ref 3.8–5.2)
SODIUM SERPL-SCNC: 133 MMOL/L (ref 136–145)
TROPONIN-HIGH SENSITIVITY: 49 NG/L (ref 0–51)
TROPONIN-HIGH SENSITIVITY: 54 NG/L (ref 0–51)
TROPONIN-HIGH SENSITIVITY: 54 NG/L (ref 0–51)
VENTRICULAR RATE, ECG03: 103 BPM
WBC # BLD AUTO: 12.6 K/UL (ref 3.6–11)

## 2022-09-02 PROCEDURE — 93306 TTE W/DOPPLER COMPLETE: CPT | Performed by: INTERNAL MEDICINE

## 2022-09-02 PROCEDURE — 94761 N-INVAS EAR/PLS OXIMETRY MLT: CPT

## 2022-09-02 PROCEDURE — C9113 INJ PANTOPRAZOLE SODIUM, VIA: HCPCS | Performed by: STUDENT IN AN ORGANIZED HEALTH CARE EDUCATION/TRAINING PROGRAM

## 2022-09-02 PROCEDURE — 94660 CPAP INITIATION&MGMT: CPT

## 2022-09-02 PROCEDURE — 90945 DIALYSIS ONE EVALUATION: CPT

## 2022-09-02 PROCEDURE — 93306 TTE W/DOPPLER COMPLETE: CPT

## 2022-09-02 PROCEDURE — 74011250637 HC RX REV CODE- 250/637: Performed by: STUDENT IN AN ORGANIZED HEALTH CARE EDUCATION/TRAINING PROGRAM

## 2022-09-02 PROCEDURE — 99223 1ST HOSP IP/OBS HIGH 75: CPT | Performed by: FAMILY MEDICINE

## 2022-09-02 PROCEDURE — 74011000250 HC RX REV CODE- 250

## 2022-09-02 PROCEDURE — 3E1M39Z IRRIGATION OF PERITONEAL CAVITY USING DIALYSATE, PERCUTANEOUS APPROACH: ICD-10-PCS | Performed by: INTERNAL MEDICINE

## 2022-09-02 PROCEDURE — 74011250636 HC RX REV CODE- 250/636: Performed by: STUDENT IN AN ORGANIZED HEALTH CARE EDUCATION/TRAINING PROGRAM

## 2022-09-02 PROCEDURE — 5A09357 ASSISTANCE WITH RESPIRATORY VENTILATION, LESS THAN 24 CONSECUTIVE HOURS, CONTINUOUS POSITIVE AIRWAY PRESSURE: ICD-10-PCS

## 2022-09-02 PROCEDURE — 93005 ELECTROCARDIOGRAM TRACING: CPT

## 2022-09-02 PROCEDURE — 65270000029 HC RM PRIVATE

## 2022-09-02 PROCEDURE — 74011250637 HC RX REV CODE- 250/637

## 2022-09-02 PROCEDURE — 36415 COLL VENOUS BLD VENIPUNCTURE: CPT

## 2022-09-02 PROCEDURE — 74011250636 HC RX REV CODE- 250/636: Performed by: INTERNAL MEDICINE

## 2022-09-02 PROCEDURE — 85610 PROTHROMBIN TIME: CPT

## 2022-09-02 PROCEDURE — 80048 BASIC METABOLIC PNL TOTAL CA: CPT

## 2022-09-02 PROCEDURE — 85025 COMPLETE CBC W/AUTO DIFF WBC: CPT

## 2022-09-02 PROCEDURE — 77010033678 HC OXYGEN DAILY

## 2022-09-02 PROCEDURE — 74011000250 HC RX REV CODE- 250: Performed by: STUDENT IN AN ORGANIZED HEALTH CARE EDUCATION/TRAINING PROGRAM

## 2022-09-02 PROCEDURE — 74011250636 HC RX REV CODE- 250/636

## 2022-09-02 PROCEDURE — 84484 ASSAY OF TROPONIN QUANT: CPT

## 2022-09-02 PROCEDURE — A4726 DIALYS SOL FLD VOL > 5999CC: HCPCS | Performed by: INTERNAL MEDICINE

## 2022-09-02 RX ORDER — NITROGLYCERIN 0.4 MG/1
0.4 TABLET SUBLINGUAL
Status: DISCONTINUED | OUTPATIENT
Start: 2022-09-02 | End: 2022-09-10 | Stop reason: HOSPADM

## 2022-09-02 RX ORDER — POTASSIUM CHLORIDE 7.45 MG/ML
10 INJECTION INTRAVENOUS
Status: COMPLETED | OUTPATIENT
Start: 2022-09-02 | End: 2022-09-03

## 2022-09-02 RX ORDER — GENTAMICIN SULFATE 1 MG/G
OINTMENT TOPICAL DAILY
Status: DISCONTINUED | OUTPATIENT
Start: 2022-09-03 | End: 2022-09-10 | Stop reason: HOSPADM

## 2022-09-02 RX ORDER — PROCHLORPERAZINE EDISYLATE 5 MG/ML
10 INJECTION INTRAMUSCULAR; INTRAVENOUS
Status: DISCONTINUED | OUTPATIENT
Start: 2022-09-02 | End: 2022-09-02

## 2022-09-02 RX ORDER — GENTAMICIN SULFATE 1 MG/G
CREAM TOPICAL
Status: DISCONTINUED | OUTPATIENT
Start: 2022-09-02 | End: 2022-09-10 | Stop reason: HOSPADM

## 2022-09-02 RX ORDER — METOCLOPRAMIDE HYDROCHLORIDE 5 MG/ML
5 INJECTION INTRAMUSCULAR; INTRAVENOUS ONCE
Status: DISCONTINUED | OUTPATIENT
Start: 2022-09-02 | End: 2022-09-02

## 2022-09-02 RX ORDER — ONDANSETRON 2 MG/ML
4 INJECTION INTRAMUSCULAR; INTRAVENOUS EVERY 6 HOURS
Status: DISCONTINUED | OUTPATIENT
Start: 2022-09-02 | End: 2022-09-03

## 2022-09-02 RX ADMIN — ONDANSETRON 4 MG: 2 INJECTION INTRAMUSCULAR; INTRAVENOUS at 11:33

## 2022-09-02 RX ADMIN — ONDANSETRON 4 MG: 2 INJECTION INTRAMUSCULAR; INTRAVENOUS at 03:45

## 2022-09-02 RX ADMIN — MIDODRINE HYDROCHLORIDE 2.5 MG: 5 TABLET ORAL at 17:44

## 2022-09-02 RX ADMIN — POTASSIUM CHLORIDE 10 MEQ: 7.46 INJECTION, SOLUTION INTRAVENOUS at 14:35

## 2022-09-02 RX ADMIN — POTASSIUM CHLORIDE 10 MEQ: 7.46 INJECTION, SOLUTION INTRAVENOUS at 12:29

## 2022-09-02 RX ADMIN — POTASSIUM CHLORIDE 10 MEQ: 7.46 INJECTION, SOLUTION INTRAVENOUS at 08:57

## 2022-09-02 RX ADMIN — SODIUM CHLORIDE, SODIUM LACTATE, CALCIUM CHLORIDE, MAGNESIUM CHLORIDE AND DEXTROSE 6000 ML: 1.5; 538; 448; 18.3; 5.08 INJECTION, SOLUTION INTRAPERITONEAL at 16:56

## 2022-09-02 RX ADMIN — POTASSIUM CHLORIDE 10 MEQ: 7.46 INJECTION, SOLUTION INTRAVENOUS at 10:21

## 2022-09-02 RX ADMIN — FOLIC ACID 1 MG: 1 TABLET ORAL at 08:57

## 2022-09-02 RX ADMIN — SUCRALFATE 1 G: 1 TABLET ORAL at 21:27

## 2022-09-02 RX ADMIN — POTASSIUM CHLORIDE 20 MEQ: 750 TABLET, FILM COATED, EXTENDED RELEASE ORAL at 08:57

## 2022-09-02 RX ADMIN — POTASSIUM CHLORIDE 10 MEQ: 7.46 INJECTION, SOLUTION INTRAVENOUS at 15:50

## 2022-09-02 RX ADMIN — SUCRALFATE 1 G: 1 TABLET ORAL at 11:33

## 2022-09-02 RX ADMIN — SODIUM CHLORIDE, PRESERVATIVE FREE 40 MG: 5 INJECTION INTRAVENOUS at 08:57

## 2022-09-02 RX ADMIN — SODIUM CHLORIDE, SODIUM LACTATE, CALCIUM CHLORIDE, MAGNESIUM CHLORIDE AND DEXTROSE 6000 ML: 1.5; 538; 448; 18.3; 5.08 INJECTION, SOLUTION INTRAPERITONEAL at 16:55

## 2022-09-02 RX ADMIN — CALCITRIOL CAPSULES 0.25 MCG 0.5 MCG: 0.25 CAPSULE ORAL at 09:06

## 2022-09-02 RX ADMIN — Medication 5 ML: at 21:27

## 2022-09-02 RX ADMIN — POTASSIUM CHLORIDE 10 MEQ: 7.46 INJECTION, SOLUTION INTRAVENOUS at 11:32

## 2022-09-02 RX ADMIN — ATORVASTATIN CALCIUM 80 MG: 20 TABLET, FILM COATED ORAL at 21:26

## 2022-09-02 RX ADMIN — SUCRALFATE 1 G: 1 TABLET ORAL at 17:44

## 2022-09-02 RX ADMIN — ONDANSETRON 4 MG: 2 INJECTION INTRAMUSCULAR; INTRAVENOUS at 17:41

## 2022-09-02 RX ADMIN — ALLOPURINOL 100 MG: 100 TABLET ORAL at 08:57

## 2022-09-02 RX ADMIN — NITROGLYCERIN 0.4 MG: 0.4 TABLET, ORALLY DISINTEGRATING SUBLINGUAL at 18:53

## 2022-09-02 RX ADMIN — ONDANSETRON 4 MG: 2 INJECTION INTRAMUSCULAR; INTRAVENOUS at 23:54

## 2022-09-02 RX ADMIN — MIDODRINE HYDROCHLORIDE 2.5 MG: 5 TABLET ORAL at 08:57

## 2022-09-02 RX ADMIN — Medication 10 ML: at 08:59

## 2022-09-02 RX ADMIN — SUCRALFATE 1 G: 1 TABLET ORAL at 08:57

## 2022-09-02 RX ADMIN — Medication 10 ML: at 14:35

## 2022-09-02 NOTE — CONSULTS
[address] [place] [address]. [address] [place] [address] NP  [phone]                    GASTROENTEROLOGY CONSULTATION NOTE              NAME:  Gracie Magaña   :   1955   MRN:   [id number]       Referring Physician:    Dr. Crystal Ventura Date:   2022 3:18 PM    Chief Complaint:    Intractable Nausea and Vomiting     History of Present Illness:    Patient is a 61 y.o. who we have been asked to see in consultation for the above complaints. The patient presented to the ER for complaints of nausea and vomiting. Reports she was recently admitted at [facility] and was treated with diuretics for CHF. She denies any history of liver disease. Denies use of alcohol and illicit drugs. She has history of CKD and is on PD. Her work up this admission includes a CT Scan showing moderate volume abdominal fluid, kidney lesion, bIncreasing moderate volume ascites. Right abdominal peritoneal catheter. PMH:  Past Medical History:   Diagnosis Date     Sleep Apnea 2011    Compliant with c-pap. Aortic aneurysm (HCC)     Abdominal    CAD (coronary Artery Disease) Native Artery 2011    Chronic kidney disease     Hemodiaylsis  3x/week    Chronic pain     CKD (chronic kidney disease) stage 4, GFR 15-29 ml/min ([facility]) 2/10/2017    Diabetic gastroparesis (HCC)     Diastolic heart failure ([address] 10/5/2012    DM type 2 causing neurological disease ([address]     DM type 2 causing renal disease (HCC)     Insulin SS at night only PRN    DM type 2 causing vascular disease ([address]     Esophageal stricture     dialted Dr. Lydia Evans    G6PD deficiency     trait    GERD (gastroesophageal reflux disease)     Gout     Heart failure ([address]     Hemodialysis access, AV graft ([address] 2017    Dialysis MWF    [address]      Hypertension     ILD (interstitial lung disease) ([address]     open lung bx CJW     Infected dental caries 3/17/2020    Infection of total right knee replacement ([address] 3/17/2020    Loss of appetite 3/17/2020    Morbid obesity (HCC)     OA (osteoarthritis)     Ovarian cancer ([facility]     cervical and uterine    Rheumatoid arteritis (HCC)     S/P left pulmonary artery pressure sensor implant placement 8/31/2017    Cardiomems     Thromboembolus ([facility]     Right calf     Tobacco use disorder 3/21/2012    Uterine cervix cancer ([facility]     Vitamin D deficiency 8/9/2013       PSH:  Past Surgical History:   Procedure Laterality Date    COLONOSCOPY N/A 6/24/2016    COLONOSCOPY performed by Jamir Hogan MD at [facility].      bilateral    HX CHOLECYSTECTOMY      HX HEART CATHETERIZATION      x 7    HX HERNIA REPAIR      HX HYSTERECTOMY      HX ORTHOPAEDIC Right 11/12/12    right knee replacement    HX ORTHOPAEDIC Bilateral     carpal tunnel repair    HX ORTHOPAEDIC Right     PLATE IN ANKLE DUE TO FRACTURE    HX SALPINGO-OOPHORECTOMY      HX TONSIL AND ADENOIDECTOMY      HX TONSILLECTOMY      HX VASCULAR ACCESS Left     Left lower arm AV fistula    IR INSERT PERITONEAL VENOUS SHUNT      TX CARDIAC SURG PROCEDURE UNLIST  02/2019    x4 with last sttent placed 2/2019. TX CHEST SURGERY PROCEDURE UNLISTED Right     > 20 years ago  RLL removed     UPPER GI ENDOSCOPY,DILATN W GUIDE  6/24/2016            Allergies: Allergies   Allergen Reactions    Contrast Dye [Iodine] Anaphylaxis     Tolerates when pre medicated w/ IV solumedrol and benadryl prior to procedure     Shellfish Derived Anaphylaxis    Levaquin [Levofloxacin] Nausea and Vomiting    Morphine Hives and Itching    Nsaids (Non-Steroidal Anti-Inflammatory Drug) Nausea and Vomiting       Home Medications:  Prior to Admission Medications   Prescriptions Last Dose Informant Patient Reported? Taking? CALCITRIOL PO   Yes No   Sig: Take 0.5 mg by mouth.    Oxygen  Self Yes No   Sig: O2 2L NC 24/7   albuterol (PROAIR HFA) 90 mcg/actuation inhaler   No No   Sig: Take 2 Puffs by inhalation every four (4) hours as needed for Wheezing. allopurinoL (ZYLOPRIM) 100 mg tablet   No No   Sig: Take 1 tablet by mouth once daily   atorvastatin (LIPITOR) 80 mg tablet   No No   Sig: TAKE 1 TABLET BY MOUTH ONCE DAILY NIGHTLY   carvediloL (COREG) 6.25 mg tablet   No No   Sig: Take 1 Tablet by mouth two (2) times daily (with meals). clotrimazole (MYCELEX) 10 mg kevin   No No   Sig: Take 1 Tablet by mouth five (5) times daily for 10 days. docusate sodium (STOOL SOFTENER PO)   Yes No   Sig: Take  by mouth daily. Patient not taking: No sig reported   folic acid (FOLVITE) 1 mg tablet   No No   Sig: Take 1 Tablet by mouth in the morning. isosorbide mononitrate ER (IMDUR) 120 mg CR tablet   No No   Sig: Take 0.5 Tablets by mouth every morning. midodrine (PROAMATINE) 2.5 mg tablet   No No   Sig: Take 1 Tablet by mouth two (2) times a day for 30 days. nitroglycerin (NITROSTAT) 0.4 mg SL tablet   No No   Si Tablet by SubLINGual route every five (5) minutes as needed for Chest Pain. Up to 3 doses. ondansetron (ZOFRAN ODT) 4 mg disintegrating tablet   No No   Sig: Take 1 Tablet by mouth every eight (8) hours as needed for Nausea or Vomiting. potassium chloride (K-DUR, KLOR-CON M20) 20 mEq tablet   No No   Sig: Take 1 Tablet by mouth in the morning. sucralfate (CARAFATE) 1 gram tablet   No No   Sig: Take 1 tablet by mouth 4 times daily   triamcinolone acetonide (KENALOG) 0.1 % ointment   No No   Sig: Apply  to affected area two (2) times a day. use thin layer   warfarin (COUMADIN) 1 mg tablet 2022 at noon Self Yes Yes   Sig: Take 1 mg by mouth every Monday, Wednesday, Friday. Alternate with 2.5mg on tues, thurs, sat, sun   warfarin (COUMADIN) 2.5 mg tablet 2022 at noon  Yes Yes   Sig: Take 2.5 mg by mouth every Tuesday, Thursday, Saturday & .       Facility-Administered Medications: None       Hospital Medications:  Current Facility-Administered Medications   Medication Dose Route Frequency pantoprazole (PROTONIX) 40 mg in 0.9% sodium chloride 10 mL injection  40 mg IntraVENous DAILY    ondansetron (ZOFRAN) injection 4 mg  4 mg IntraVENous Q6H    potassium chloride 10 mEq in 100 ml IVPB  10 mEq IntraVENous Q1H    Warfarin No Dose 9/2/22 due to INR of 8.5   Other ONCE    [START ON 9/3/2022] gentamicin (GARAMYCIN) 0.1 % ointment   Topical DAILY    peritoneal dialysis DEXTROSE 1.5% (2.5 mEq/L low calcium) solution 6,000 mL  6,000 mL IntraPERitoneal DAILY    peritoneal dialysis DEXTROSE 1.5% (2.5 mEq/L low calcium) solution 6,000 mL  6,000 mL IntraPERitoneal DAILY    sodium chloride (NS) flush 5-40 mL  5-40 mL IntraVENous Q8H    sodium chloride (NS) flush 5-40 mL  5-40 mL IntraVENous PRN    acetaminophen (TYLENOL) tablet 650 mg  650 mg Oral Q6H PRN    Or    acetaminophen (TYLENOL) suppository 650 mg  650 mg Rectal Q6H PRN    polyethylene glycol (MIRALAX) packet 17 g  17 g Oral DAILY PRN    0.9% sodium chloride infusion  25 mL/hr IntraVENous CONTINUOUS    albuterol-ipratropium (DUO-NEB) 2.5 MG-0.5 MG/3 ML  3 mL Nebulization Q6H PRN    allopurinoL (ZYLOPRIM) tablet 100 mg  100 mg Oral DAILY    atorvastatin (LIPITOR) tablet 80 mg  80 mg Oral QHS    calcitRIOL (ROCALTROL) capsule 0.5 mcg  0.5 mcg Oral DAILY    folic acid (FOLVITE) tablet 1 mg  1 mg Oral DAILY    midodrine (PROAMATINE) tablet 2.5 mg  2.5 mg Oral BID    potassium chloride SR (KLOR-CON 10) tablet 20 mEq  20 mEq Oral DAILY    sucralfate (CARAFATE) tablet 1 g  1 g Oral AC&HS    Warfarin - Pharmacy to Dose   Other Rx Dosing/Monitoring     Current Outpatient Medications   Medication Sig    warfarin (COUMADIN) 2.5 mg tablet Take 2.5 mg by mouth every Tuesday, Thursday, Saturday & Sunday. warfarin (COUMADIN) 1 mg tablet Take 1 mg by mouth every Monday, Wednesday, Friday. Alternate with 2.5mg on tues, thurs, sat, sun    clotrimazole (MYCELEX) 10 mg kevin Take 1 Tablet by mouth five (5) times daily for 10 days.     midodrine (PROAMATINE) 2.5 mg tablet Take 1 Tablet by mouth two (2) times a day for 30 days. nitroglycerin (NITROSTAT) 0.4 mg SL tablet 1 Tablet by SubLINGual route every five (5) minutes as needed for Chest Pain. Up to 3 doses. triamcinolone acetonide (KENALOG) 0.1 % ointment Apply  to affected area two (2) times a day. use thin layer    folic acid (FOLVITE) 1 mg tablet Take 1 Tablet by mouth in the morning. potassium chloride (K-DUR, KLOR-CON M20) 20 mEq tablet Take 1 Tablet by mouth in the morning. carvediloL (COREG) 6.25 mg tablet Take 1 Tablet by mouth two (2) times daily (with meals). ondansetron (ZOFRAN ODT) 4 mg disintegrating tablet Take 1 Tablet by mouth every eight (8) hours as needed for Nausea or Vomiting.    isosorbide mononitrate ER (IMDUR) 120 mg CR tablet Take 0.5 Tablets by mouth every morning. atorvastatin (LIPITOR) 80 mg tablet TAKE 1 TABLET BY MOUTH ONCE DAILY NIGHTLY    allopurinoL (ZYLOPRIM) 100 mg tablet Take 1 tablet by mouth once daily    CALCITRIOL PO Take 0.5 mg by mouth.    sucralfate (CARAFATE) 1 gram tablet Take 1 tablet by mouth 4 times daily    docusate sodium (STOOL SOFTENER PO) Take  by mouth daily. (Patient not taking: No sig reported)    albuterol (PROAIR HFA) 90 mcg/actuation inhaler Take 2 Puffs by inhalation every four (4) hours as needed for Wheezing.     Oxygen O2 2L NC 24/7       Social History:  Social History     Tobacco Use    Smoking status: Former     Packs/day: 0.50     Years: 41.00     Pack years: 20.50     Types: Cigarettes     Quit date: 2014     Years since quittin.8    Smokeless tobacco: Never   Substance Use Topics    Alcohol use: No       Family History:  Family History   Problem Relation Age of Onset    Heart Disease Mother     No Known Problems Daughter     No Known Problems Son     No Known Problems Son     Anesth Problems Neg Hx        Review of Systems:  Constitutional: negative fever, negative chills, negative weight loss  Eyes:   negative visual changes  ENT: negative sore throat, tongue or lip swelling  Respiratory:  negative cough, negative dyspnea  Cards:  negative for chest pain, palpitations, lower extremity edema  GI:   See HPI  :  negative for frequency, dysuria  Integument:  negative for rash and pruritus  Heme:  negative for easy bruising and gum/nose bleeding  Musculoskel: negative for myalgias,  back pain and muscle weakness  Neuro: negative for headaches, dizziness, vertigo  Psych:  negative for feelings of anxiety, depression     Objective:   Patient Vitals for the past 8 hrs:   BP Temp Pulse Resp SpO2   09/02/22 1200 138/71 98 °F (36.7 °C) 77 16 95 %   09/02/22 1152 138/71 -- 77 -- --   09/02/22 1102 116/83 98 °F (36.7 °C) 93 16 95 %   09/02/22 1052 -- -- -- -- 98 %   09/02/22 0758 104/83 -- 90 -- --   09/02/22 0727 -- -- -- -- 99 %     No intake/output data recorded. 08/31 1901 - 09/02 0700  In: 300 [I.V.:300]  Out: -     EXAM:     NEURO-alert, oriented x3, affect appropriate, no focal neurological deficits, moves all extremities well, no involuntary movements, reflexes at knee and ankle intact   HEENT-Head: Normocephalic, no lesions, without obvious abnormality. LUNGS-clear to auscultation bilaterally    COR-regular rate and rhythym     ABD- soft, non-tender.  Bowel sounds normal. No masses,  no organomegaly     EXT-no edema    Skin - No rash     Data Review     Recent Labs     09/02/22 0347 09/01/22 1727   WBC 12.6* 13.3*   HGB 8.6* 9.3*   HCT 26.6* 28.7*    242     Recent Labs     09/02/22 0347 09/01/22 1727   * 131*   K 2.9* 2.8*   CL 95* 92*   CO2 25 27   BUN 56* 56*   CREA 10.50* 10.50*   * 126*   CA 8.8 9.1     Recent Labs     09/01/22 1727      TP 6.8   ALB 2.6*   GLOB 4.2*   LPSE 662*     Recent Labs     09/02/22  0347   INR 8.5*   PTP 77.9*       Patient Active Problem List   Diagnosis Code    Coronary artery disease I25.10    JASEN on CPAP G47.33, Z99.89    Pulmonary hypertension (HCC) I27.20    HTN (hypertension) I10    Morbid obesity E66.01    Normocytic anemia D64.9    DM (diabetes mellitus), type 2 with renal complications ([facility]) S25.81    ILD (interstitial lung disease) ([facility]) J84.9    Warfarin anticoagulation Z79.01    S/P left pulmonary artery pressure sensor implant placement Z95.9    Hx of deep venous thrombosis Z86.718    Sleep apnea G47.30    Diabetic gastroparesis (HCC) E11.43, K31.84    GERD (gastroesophageal reflux disease) K21.9    DM type 2 causing neurological disease (HCC) E11.49    DM type 2 causing vascular disease (HCC) E11.59    Gout M10.9    Limited mobility Z74.09    ESRD on peritoneal dialysis ([facility] N18.6, Z99.2    (HFpEF) heart failure with preserved ejection fraction ([facility]) I50.30    Nephrolithiasis N20.0    PVC (premature ventricular contraction) I49.3    S/P ORIF (open reduction internal fixation) fracture Z98.890, Z87.81    Coronary stent patent Z95.5    Edema of both lower extremities R60.0    Hypokalemia E87.6    Chest pain R07.9    Abdominal pain R10.9    Ascites R18.8    Intractable vomiting with nausea R11.2    Hyponatremia E87.1    Supratherapeutic INR R79.1       Assessment and Plan:  Intractable Nausea and Vomiting: This could potentially due to CHF, Uremia. Culture of peritoneal fluid with no growth. - Clear liquid diet  - Continue supportive care with antiemetics. - Continue PPI  - No plans for Endoscopic evaluation at this time. Thanks for allowing me to participate in the care of this patient.   Signed By: Mary Ellen Guzman NP     9/2/2022  3:18 PM        St

## 2022-09-02 NOTE — ED NOTES
Spoke with [facility] nurse and was advised they will be here this afternoon to start Pt's dialysis

## 2022-09-02 NOTE — PROGRESS NOTES
Slime Cartagena  YOB: 1955      Assessment & Plan:     ESKD-CCPD   Hypokalemia  Hypotension  N/V   HFpEF    -Resume home CCPD prescription 4 exchange: 2800 ml x4  and will hold mid-day exchange 1500 ml Icodextrin  -Give KCL 40 meq pox1 and resume KCL 20 meq daily  -reduced IVF to 25 cc/hr  -start ALDO 3 times/week             Subjective:   CC: F/U ESKD -CCPD admitted with N/V/Dizziness  HPI: Patient seen in the ER  ROS:Tiredness +  Current Facility-Administered Medications   Medication Dose Route Frequency    metoclopramide HCl (REGLAN) injection 5 mg  5 mg IntraVENous ONCE    pantoprazole (PROTONIX) 40 mg in 0.9% sodium chloride 10 mL injection  40 mg IntraVENous DAILY    ondansetron (ZOFRAN) injection 4 mg  4 mg IntraVENous Q6H    potassium chloride 10 mEq in 100 ml IVPB  10 mEq IntraVENous Q1H    Warfarin No Dose 9/2/22 due to INR of 8.5   Other ONCE    peritoneal dialysis DEXTROSE 1.5% (2.5 mEq/L low calcium) solution 2,800 mL  2,800 mL IntraPERitoneal QPM    [START ON 9/3/2022] gentamicin (GARAMYCIN) 0.1 % ointment   Topical DAILY    sodium chloride (NS) flush 5-40 mL  5-40 mL IntraVENous Q8H    sodium chloride (NS) flush 5-40 mL  5-40 mL IntraVENous PRN    acetaminophen (TYLENOL) tablet 650 mg  650 mg Oral Q6H PRN    Or    acetaminophen (TYLENOL) suppository 650 mg  650 mg Rectal Q6H PRN    polyethylene glycol (MIRALAX) packet 17 g  17 g Oral DAILY PRN    0.9% sodium chloride infusion  50 mL/hr IntraVENous CONTINUOUS    albuterol-ipratropium (DUO-NEB) 2.5 MG-0.5 MG/3 ML  3 mL Nebulization Q6H PRN    allopurinoL (ZYLOPRIM) tablet 100 mg  100 mg Oral DAILY    atorvastatin (LIPITOR) tablet 80 mg  80 mg Oral QHS    calcitRIOL (ROCALTROL) capsule 0.5 mcg  0.5 mcg Oral DAILY    folic acid (FOLVITE) tablet 1 mg  1 mg Oral DAILY    midodrine (PROAMATINE) tablet 2.5 mg  2.5 mg Oral BID    potassium chloride SR (KLOR-CON 10) tablet 20 mEq  20 mEq Oral DAILY    sucralfate (CARAFATE) tablet 1 g  1 g Oral AC&HS    Warfarin - Pharmacy to Dose   Other Rx Dosing/Monitoring     Current Outpatient Medications   Medication Sig    warfarin (COUMADIN) 2.5 mg tablet Take 2.5 mg by mouth every Tuesday, Thursday, Saturday & Sunday. warfarin (COUMADIN) 1 mg tablet Take 1 mg by mouth every Monday, Wednesday, Friday. Alternate with 2.5mg on tues, thurs, sat, sun    clotrimazole (MYCELEX) 10 mg kevin Take 1 Tablet by mouth five (5) times daily for 10 days. midodrine (PROAMATINE) 2.5 mg tablet Take 1 Tablet by mouth two (2) times a day for 30 days. nitroglycerin (NITROSTAT) 0.4 mg SL tablet 1 Tablet by SubLINGual route every five (5) minutes as needed for Chest Pain. Up to 3 doses. triamcinolone acetonide (KENALOG) 0.1 % ointment Apply  to affected area two (2) times a day. use thin layer    folic acid (FOLVITE) 1 mg tablet Take 1 Tablet by mouth in the morning. potassium chloride (K-DUR, KLOR-CON M20) 20 mEq tablet Take 1 Tablet by mouth in the morning. carvediloL (COREG) 6.25 mg tablet Take 1 Tablet by mouth two (2) times daily (with meals). ondansetron (ZOFRAN ODT) 4 mg disintegrating tablet Take 1 Tablet by mouth every eight (8) hours as needed for Nausea or Vomiting.    isosorbide mononitrate ER (IMDUR) 120 mg CR tablet Take 0.5 Tablets by mouth every morning. atorvastatin (LIPITOR) 80 mg tablet TAKE 1 TABLET BY MOUTH ONCE DAILY NIGHTLY    allopurinoL (ZYLOPRIM) 100 mg tablet Take 1 tablet by mouth once daily    CALCITRIOL PO Take 0.5 mg by mouth.    sucralfate (CARAFATE) 1 gram tablet Take 1 tablet by mouth 4 times daily    docusate sodium (STOOL SOFTENER PO) Take  by mouth daily. (Patient not taking: No sig reported)    albuterol (PROAIR HFA) 90 mcg/actuation inhaler Take 2 Puffs by inhalation every four (4) hours as needed for Wheezing. Oxygen O2 2L NC 24/7          Objective:     Vitals:  Blood pressure 116/83, pulse 93, temperature 98 °F (36.7 °C), resp.  rate 16, height 5' 10\" (1.778 m), weight 122.5 kg (270 lb 1 oz), SpO2 95 %. Temp (24hrs), Av.1 °F (36.7 °C), Min:98 °F (36.7 °C), Max:98.1 °F (36.7 °C)      Intake and Output:  No intake/output data recorded.  1901 -  0700  In: 300 [I.V.:300]  Out: -     Physical Exam:                   Physical Examination:   GENERAL ASSESSMENT: NAD  HEENT:Nontraumatic   CHEST: CTA  HEART: S1S2  ABDOMEN: Soft,NT,  :Myers:   EXTREMITY: EDEMA  NEURO:Grossly non focal          ECG/rhythm:    Data Review      No results for input(s): TNIPOC in the last 72 hours. No lab exists for component: ITNL   No results for input(s): CPK, CKMB, TROIQ in the last 72 hours. Recent Labs     22  0347 22  1727   * 131*   K 2.9* 2.8*   CL 95* 92*   CO2 25 27   BUN 56* 56*   CREA 10.50* 10.50*   * 126*   CA 8.8 9.1   ALB  --  2.6*   WBC 12.6* 13.3*   HGB 8.6* 9.3*   HCT 26.6* 28.7*    242      Recent Labs     22  0347   INR 8.5*   PTP 77.9*     Needs: urine analysis, urine sodium, protein and creatinine  Lab Results   Component Value Date/Time    Sodium,urine random 25 11/10/2014 12:40 PM    Creatinine, urine 145.29 2017 05:01 AM               Discussed with:   Patient     : Liliana Owusu MD  2022        [facility]:  [email or web address]  [facility] office:  [address] [place] [address], [address] [place] [address] [address]  [place], [address] [facility] [address]  Phone: [phone]  Fax :     [phone]    [place] office:  [address] [facility], [address] [facility] [address]  Phone - [phone]  Fax - [phone]

## 2022-09-02 NOTE — ED NOTES
Spoke with family practice and was advised pt can preform own dialysis as they normally do tongith while waiting fro nephrology consult

## 2022-09-02 NOTE — DIALYSIS
Peritoneal Dialysis Initiation / [phone]     Orders   Therapy Time: 9 hrs   Cycles: 4   Fill Volume: 2800 mL   Last Fill Volume: 0 mL   Total Volume: 11,200 mL   Solution: (2) 1.5 % Dextrose Dianeal bags      Access   Type & Location: [facility]   Comments: PD site cleansed with Exsept followed by Gentamycin and dry gauze dressing. No redness or drainage at exit site. Dsg change date: 09/02/22     Labs   HBsAg (Antigen) / date: Neg (08/10/22)                          HBsAb (Antibody) / date:    Source: Physicians portal     Safety:   Time Out Done:   (Time) 6505   Consent obtained/signed: Verified   Education: Infection control   Primary Nurse Rpt Pre: Rock Cleveland RN   Primary Nurse Rpt Post: Rock Cleveland RN     Comments:   Pt orders, notes, labs, code status reviewed. Prior to connection, one-minute Alcavis scrub performed. Start time: 1700 (09/02/22)  Estimated End Time: 0200 (09/03/22)    Remained present during initial drain followed by initiation of first fill. Prior to departure, bed in lowest position, call bell and personal belongings within reach.

## 2022-09-02 NOTE — PROGRESS NOTES
Bedside and Verbal shift change report given to CRISTINA Rodriguez (oncoming nurse) by Simón Pyle RN (offgoing nurse). Report included the following information SBAR, Kardex, and ED Summary.

## 2022-09-02 NOTE — ED NOTES
TRANSFER - OUT REPORT:    Verbal report given to [facility] RN(name) on lAin Brown  being transferred to Remote Tele(unit) for routine progression of care       Report consisted of patients Situation, Background, Assessment and   Recommendations(SBAR). Information from the following report(s) SBAR, Kardex, ED Summary, Intake/Output, MAR and Recent Results was reviewed with the receiving nurse. Lines:   Peripheral IV 71/04/49 Right Cephalic (Active)        Opportunity for questions and clarification was provided.       Patient transported with:   O2 @ 2 liters

## 2022-09-02 NOTE — PROGRESS NOTES
Senior Resident Admission Note       CC: N/V    HPI:  Ernestina Villagomez is a 61 y.o. female w/ a PMHX of ESRD on PD, CAD,  JASEN, T2DM, HTN who presents to the ER complaining of intractable N/V. Recently admitted to OSH for CHF exacerbation and had a lot of fluid taken off. Since that time pt has been unable to keep anything down, feeling very fatigued, and no appetite. In the ED CTAP was wnl, lipase was mildly elevated. Chart reviewed. Patient seen, examined, and discussed with Dr. Josefa Kayser (PGY-1). See H&P for more details. A/P: Admit to remote tele. Code status: Full. Intractable N/V: Gastroparesis vs gastritis vs ascites vs pancreatic irritation. Will consult GI for further assistance. Will gently hydrate and control sx w/ zofran. I agree with remaining assessment and plan as documented in Dr. Natarajan Quiet Full H&P. Pt discussed with Dr. Chao Henry (on-call attending physician).       Sara Guidry MD  Family Medicine Resident, PGY-3

## 2022-09-02 NOTE — PROGRESS NOTES
[address] [facility] Tracey Harrington 35   Office [phone]  Fax [phone]          Subjective / Objective     Subjective  Overnight Events: no acute events overnight. Did not sleep as she was not given her CPAP. Patient seen and examined at bedside. Reports slight improvement in nausea. Has had several episodes of vomiting vs spit up. Denies CP, SOB, dysuria. Respiratory: O2 Device: Nasal cannula 2L, sating 100%  Visit Vitals  BP (!) 141/83   Pulse 90   Temp 98.1 °F (36.7 °C)   Resp 13   Ht 5' 10\" (1.778 m)   Wt 270 lb (122.5 kg)   SpO2 100%   BMI 38.74 kg/m²     Physical Examination:   General appearance - alert, tired appearing, and in no distress  Chest - clear to auscultation, no wheezes, rales or rhonchi, symmetric air entry  Heart - normal rate, regular rhythm, normal S1, S2, no murmurs, rubs, clicks or gallops,   Abdomen - soft,, nondistended, no masses or organomegaly  Neurological - alert, oriented, normal speech, no focal findings  Skin - warm, dry.  No notable rashes  Extremities - peripheral pulses normal, no pedal edema, no clubbing or cyanosis  Psychiatric - normal speech and thought processes    I/O:  09/01 0701 - 09/02 0700  In: 300 [I.V.:300]  Out: -   Inpatient Medications  Current Facility-Administered Medications   Medication    prochlorperazine (COMPAZINE) injection 10 mg    sodium chloride (NS) flush 5-40 mL    sodium chloride (NS) flush 5-40 mL    acetaminophen (TYLENOL) tablet 650 mg    Or    acetaminophen (TYLENOL) suppository 650 mg    polyethylene glycol (MIRALAX) packet 17 g    ondansetron (ZOFRAN ODT) tablet 4 mg    Or    ondansetron (ZOFRAN) injection 4 mg    0.9% sodium chloride infusion    albuterol-ipratropium (DUO-NEB) 2.5 MG-0.5 MG/3 ML    allopurinoL (ZYLOPRIM) tablet 100 mg    atorvastatin (LIPITOR) tablet 80 mg    calcitRIOL (ROCALTROL) capsule 0.5 mcg    folic acid (FOLVITE) tablet 1 mg    midodrine (PROAMATINE) tablet 2.5 mg    potassium chloride SR (KLOR-CON 10) tablet 20 mEq    sucralfate (CARAFATE) tablet 1 g    Warfarin - Pharmacy to Dose     Current Outpatient Medications   Medication Sig    warfarin (COUMADIN) 2.5 mg tablet Take 2.5 mg by mouth every Tuesday, Thursday, Saturday & Sunday. warfarin (COUMADIN) 1 mg tablet Take 1 mg by mouth every Monday, Wednesday, Friday. Alternate with 2.5mg on tues, thurs, sat, sun    clotrimazole (MYCELEX) 10 mg kevin Take 1 Tablet by mouth five (5) times daily for 10 days. midodrine (PROAMATINE) 2.5 mg tablet Take 1 Tablet by mouth two (2) times a day for 30 days. nitroglycerin (NITROSTAT) 0.4 mg SL tablet 1 Tablet by SubLINGual route every five (5) minutes as needed for Chest Pain. Up to 3 doses. triamcinolone acetonide (KENALOG) 0.1 % ointment Apply  to affected area two (2) times a day. use thin layer    folic acid (FOLVITE) 1 mg tablet Take 1 Tablet by mouth in the morning. potassium chloride (K-DUR, KLOR-CON M20) 20 mEq tablet Take 1 Tablet by mouth in the morning. carvediloL (COREG) 6.25 mg tablet Take 1 Tablet by mouth two (2) times daily (with meals). ondansetron (ZOFRAN ODT) 4 mg disintegrating tablet Take 1 Tablet by mouth every eight (8) hours as needed for Nausea or Vomiting.    isosorbide mononitrate ER (IMDUR) 120 mg CR tablet Take 0.5 Tablets by mouth every morning. atorvastatin (LIPITOR) 80 mg tablet TAKE 1 TABLET BY MOUTH ONCE DAILY NIGHTLY    allopurinoL (ZYLOPRIM) 100 mg tablet Take 1 tablet by mouth once daily    CALCITRIOL PO Take 0.5 mg by mouth.    sucralfate (CARAFATE) 1 gram tablet Take 1 tablet by mouth 4 times daily    docusate sodium (STOOL SOFTENER PO) Take  by mouth daily. (Patient not taking: No sig reported)    albuterol (PROAIR HFA) 90 mcg/actuation inhaler Take 2 Puffs by inhalation every four (4) hours as needed for Wheezing.     Oxygen O2 2L NC 24/7     Current Facility-Administered Medications   Medication Dose Route Frequency    prochlorperazine (COMPAZINE) injection 10 mg  10 mg IntraVENous Q6H PRN    sodium chloride (NS) flush 5-40 mL  5-40 mL IntraVENous Q8H    sodium chloride (NS) flush 5-40 mL  5-40 mL IntraVENous PRN    acetaminophen (TYLENOL) tablet 650 mg  650 mg Oral Q6H PRN    Or    acetaminophen (TYLENOL) suppository 650 mg  650 mg Rectal Q6H PRN    polyethylene glycol (MIRALAX) packet 17 g  17 g Oral DAILY PRN    ondansetron (ZOFRAN ODT) tablet 4 mg  4 mg Oral Q8H PRN    Or    ondansetron (ZOFRAN) injection 4 mg  4 mg IntraVENous Q6H PRN    0.9% sodium chloride infusion  50 mL/hr IntraVENous CONTINUOUS    albuterol-ipratropium (DUO-NEB) 2.5 MG-0.5 MG/3 ML  3 mL Nebulization Q6H PRN    allopurinoL (ZYLOPRIM) tablet 100 mg  100 mg Oral DAILY    atorvastatin (LIPITOR) tablet 80 mg  80 mg Oral QHS    calcitRIOL (ROCALTROL) capsule 0.5 mcg  0.5 mcg Oral DAILY    folic acid (FOLVITE) tablet 1 mg  1 mg Oral DAILY    midodrine (PROAMATINE) tablet 2.5 mg  2.5 mg Oral BID    potassium chloride SR (KLOR-CON 10) tablet 20 mEq  20 mEq Oral DAILY    sucralfate (CARAFATE) tablet 1 g  1 g Oral AC&HS    Warfarin - Pharmacy to Dose   Other Rx Dosing/Monitoring     Current Outpatient Medications   Medication Sig    warfarin (COUMADIN) 2.5 mg tablet Take 2.5 mg by mouth every Tuesday, Thursday, Saturday & Sunday. warfarin (COUMADIN) 1 mg tablet Take 1 mg by mouth every Monday, Wednesday, Friday. Alternate with 2.5mg on tues, thurs, sat, sun    clotrimazole (MYCELEX) 10 mg kevin Take 1 Tablet by mouth five (5) times daily for 10 days. midodrine (PROAMATINE) 2.5 mg tablet Take 1 Tablet by mouth two (2) times a day for 30 days. nitroglycerin (NITROSTAT) 0.4 mg SL tablet 1 Tablet by SubLINGual route every five (5) minutes as needed for Chest Pain. Up to 3 doses. triamcinolone acetonide (KENALOG) 0.1 % ointment Apply  to affected area two (2) times a day. use thin layer    folic acid (FOLVITE) 1 mg tablet Take 1 Tablet by mouth in the morning.     potassium chloride (K-DUR, KLOR-CON M20) 20 mEq tablet Take 1 Tablet by mouth in the morning. carvediloL (COREG) 6.25 mg tablet Take 1 Tablet by mouth two (2) times daily (with meals). ondansetron (ZOFRAN ODT) 4 mg disintegrating tablet Take 1 Tablet by mouth every eight (8) hours as needed for Nausea or Vomiting.    isosorbide mononitrate ER (IMDUR) 120 mg CR tablet Take 0.5 Tablets by mouth every morning. atorvastatin (LIPITOR) 80 mg tablet TAKE 1 TABLET BY MOUTH ONCE DAILY NIGHTLY    allopurinoL (ZYLOPRIM) 100 mg tablet Take 1 tablet by mouth once daily    CALCITRIOL PO Take 0.5 mg by mouth.    sucralfate (CARAFATE) 1 gram tablet Take 1 tablet by mouth 4 times daily    docusate sodium (STOOL SOFTENER PO) Take  by mouth daily. (Patient not taking: No sig reported)    albuterol (PROAIR HFA) 90 mcg/actuation inhaler Take 2 Puffs by inhalation every four (4) hours as needed for Wheezing. Oxygen O2 2L NC 24/7     Allergies  Allergies   Allergen Reactions    Contrast Dye [Iodine] Anaphylaxis     Tolerates when pre medicated w/ IV solumedrol and benadryl prior to procedure     Shellfish Derived Anaphylaxis    Levaquin [Levofloxacin] Nausea and Vomiting    Morphine Hives and Itching    Nsaids (Non-Steroidal Anti-Inflammatory Drug) Nausea and Vomiting     CBC:  Recent Labs     09/02/22  0347 09/01/22  1727   WBC 12.6* 13.3*   HGB 8.6* 9.3*    476     Metabolic Panel:  Recent Labs     09/01/22  1727   *   K 2.8*   CL 92*   CO2 27   BUN 56*   CREA 10.50*   *   CA 9.1   ALB 2.6*   ALT 14     Imaging/procedures:   CT ABD PELV WO CONT    Result Date: 9/1/2022  1. Increasing moderate volume ascites. Right abdominal peritoneal catheter. 2.  Grossly unchanged right mid renal indeterminate lesion in several additional bilateral likely cystic lesions as described above. 3.  Bilateral nonobstructive nephrolithiasis. 4.  Cholecystectomy. 5.  Evidence of chronic interstitial lung disease.           Assessment and Plan Melissa Wynne is a 61 y.o. female admitted for intractable n/v. Intractable nausea and vomiting with epigastric abd pain: Lipase 662. CT Abd/Pel showed increasing moderate volume ascites. Unchanged bilateral nonobstructive nephrolithiasis. Low suspicion for SBP as abdominal exam was relatively benign. No signs of peritonitis on physical exam.  Possiblly 2/2 ascites or possible gastroparesis. Epigastric abdominal pain possibly due to Pancreatic inflammation vs gastritis vs gastroenterities. Gastroenteritis is less likely due to absence of diarrhea. -Zofran 4 mg PO q8 PRN and 4 mg IV q6 PRN for n/v  -Prochlorperazine 10 mg IV q6h PRN ordered for n/v  -GI consulted, appreciate recommendations  -Nephrology consulted, appreciate recommendations  -Rosangela hydration at 50 ml/hr   -Continue to monitor VS per unit protocol      Chronic hypokalemia: Baseline 3.3. On potassium chloride 20 meq daily at home. POA potassium 2.8. Likely 2/2 continued nausea and vomiting.    -Continue home potassium chloride 20 meq daily   -One time dose K 10 meq IV ordered, not given yet  (want to be careful given patient's hx of ESRD). -EKG to be done 9/2     Hypotension in the setting of HTN: recently taken off of home carvedilol 6.25 mg and Imdur 120 mg CR by her PCP due to low BPs. Also started on midodrine 2.5 mg BID. -Hold home carvedilol 6.25 mg and Imdur 120 mg CR  -Continue midodrine 2.5 mg BID  -Will continue to monitor at this time and readjust as BP's trend. ESRD on PD:  Follows with Dr Gila Bryan (nephrology). -Consult nephrology, appreciate recommendations  -Avoid Nephrotoxins  -Renally dose medications  -Continue Calcitriol 0.5mg daily     Coronary artery disease with history of MI: 1 SAL to OM 2004, MI in 2012 s/p 2 SAL to OM2/OM3. Has chronic proximal occlusion with L to R collaterals. Follows with Júnior Lee (cardiologist).    - Remote teli  - Echo on 9/2     Chronic HFpEF: Per chart review last echo in 2019 (EF 66-70%). Pt reports recent admission to outside hospital for diuresis. E-Echo on 9/2  -Monitor for signs/symptoms of HF     Obstructive sleep apnea: Uses CPAP nightly. Well-controlled at this time. Former smoker: 0.5 PPD for 41 years for 20.5-pack-year history. Quit on 11/9/2014. Interstitial lung disease: With chronic hypoxia on 2 L oxygen via nasal cannula at home. Biopsy negative for sarcoid per patient. Attributed to smoking history. - Albuterol PRN     Chronic sacral ulcer: painful.   -Consult inpatient wound care     T2DM with CKD: Last HgA1C  4.7 (3/25/22). Not on any medications currently. POA glucose 126.   - Monitor on daily labs     GERD Stable. -Continue home sucralfate 1g 4 times daily     Hx of DVT: Of right leg. No recurrence. Home warfarin 1 mg MWF with 2.5 mg Tue Thur Sat. INR 8.5 on 9/2.   -Pharmacy to dose warfarin     Obesity: PT with BMI of Body mass index is 38.74 kg/m². - Encouraging lifestyle modifications and further follow up outpatient. History of gout: Takes allopurinol 100 mg daily. Has not had a recent flare.   -Continue home allopurinol 100 mg daily     Intermittent constipation: Takes docusate sodium daily. Last BM on 9/1.   -MiraLAX PRN     Hyperlipidemia: Total 100, TG 78, HDL 51, LDL 33.4.  Currently well controlled on home Lipitor 80 mg nightly.   -Continue Lipitor 80 mg nightly    Patient discussed with Suleman Field, *  Meenakshi Vaughan MD  Family Medicine Resident       For Billing    Chief Complaint   Patient presents with    Nausea    Vomiting    Fatigue       Hospital Problems  Date Reviewed: 8/25/2022            Codes Class Noted POA    Abdominal pain ICD-10-CM: R10.9  ICD-9-CM: 789.00  7/23/2022 Unknown

## 2022-09-02 NOTE — PROGRESS NOTES
CARE MANAGEMENT INITIAL ASSESSEMENT      NAME:   Shaun Del Castillo   :     1955   MRN:     [id number]       Emergency Contact:  Extended Emergency Contact Information  Primary Emergency Contact: Raul Gonsales   Address: [address] [place] [address] 359, [address] Phone: [phone]  Mobile Phone: [phone]  Relation: Spouse    Advance Directive:  Full Code, does not have an advance directive. Vandana Dunlap Healthcare Decision Maker:   Pauline Nugent - [phone]    Reason for Admission:  Ms. [facility] RODRIGUEZ is a 61 y.o. female with history that includes DM, CAD, CHF, HTN, GERD, DVT, and ESRD   who was emergently admitted for:  intractable nausea/vomiting with epigastric abdominal pain.      Patient Active Problem List   Diagnosis Code    Coronary artery disease I25.10    JASEN on CPAP G47.33, Z99.89    Pulmonary hypertension (HCC) I27.20    HTN (hypertension) I10    Morbid obesity E66.01    Normocytic anemia D64.9    DM (diabetes mellitus), type 2 with renal complications ([facility]) C79.25    ILD (interstitial lung disease) ([facility]) J84.9    Warfarin anticoagulation Z79.01    S/P left pulmonary artery pressure sensor implant placement Z95.9    Hx of deep venous thrombosis Z86.718    Sleep apnea G47.30    Diabetic gastroparesis (HCC) E11.43, K31.84    GERD (gastroesophageal reflux disease) K21.9    DM type 2 causing neurological disease ([facility] [address] E11.49    DM type 2 causing vascular disease ([facility]) E11.59    Gout M10.9    Limited mobility Z74.09    ESRD on peritoneal dialysis ([facility] [address] N18.6, Z99.2    (HFpEF) heart failure with preserved ejection fraction (HCC) I50.30    Nephrolithiasis N20.0    PVC (premature ventricular contraction) I49.3    S/P ORIF (open reduction internal fixation) fracture Z98.890, Z87.81    Coronary stent patent Z95.5    Edema of both lower extremities R60.0    Hypokalemia E87.6    Chest pain R07.9    Abdominal pain R10.9    Ascites R18.8    Intractable vomiting with nausea R11.2       Assessment: In person with patient. RUR:  23%  Risk Level:  High  Value-based purchasing:   No  Bundle patient:  No    Residency:  Private residence  Exterior Steps:   2  Interior Steps:  None    Lives With:  Spouse/Significant Other    Prior functioning:  Dependent. Patient requires assistance with:  Bathing, Dressing, and Toileting    Prior DME required:  Galen Rianna, Scooter, Shower chair, Raised toilet seat, Home O2 (2L/Provider [employer]), Nebulizer, and CPAP    DME available:  Same as above    Rehab history:  Home Health:  Provider - [facility]  Date - CURRENTLY OPEN    Discharge Concerns/Barriers Identified:  None identified      Insurer:  Payor: Noel Hurst / Plan: Cynthia Melendez / Product Type: Medicare /     PCP: Kal Graham DO   Name of Practice:   [address] [place] [address]   Current patient: Yes   Approximate date of last visit: 8/25/22   Access to virtual PCP visits:  Unknown    Pharmacy:  [address] [place] [address]. Financial/Difficulty affording medications:  None identified    COVID-19 vaccination status:  Fully vaccinated + booster    DC Transport:  Family      Transition of care plan:  Home with Home Health - Provider: [facility]      Comments:   Pt admitted on 9/1/22 for intractable nausea & vomiting with epigastric abdominal pain. CM met with Pt to complete initial assessment. Pt lives at home with her spouse. Pt has [facility] through [facility]. Pt has home O2 at 2L through [employer]. Pt confirms that she wears her O2 at all times. Pt has a CPAP, nebulizer, cane, rollator, motorized scooter, shower chair, and raised toilet seat. Pt reports that her spouse has been helping her with her ADLs x2 weeks. Prior to illness and [facility] hospitalization, Pt ambulated with a cane. Pt reports she has been too weak to walk lately. Pt is disabled. Pt is not able to drive. Pt receives PD. Discharge plan is for Pt to return home and resume [facility] services. CM will need [facility] orders upon discharge. Pt states family will transport her home.   _____________________________________  Nirmala Ag, [address] [facility] Management  Available via [facility]  9/2/2022   1:51 PM      Care Management Interventions  PCP Verified by CM: Yes (Ariane Iyer, )  Mode of Transport at Discharge:  Other (see comment) (family)  Transition of Care Consult (CM Consult): Discharge Planning, [address]: No  Reason Outside [place]: Patient already serviced by other home care/hospice agency  MyChart Signup: No  Discharge Durable Medical Equipment: No  Physical Therapy Consult: No  Occupational Therapy Consult: No  Speech Therapy Consult: No  Support Systems: Spouse/Significant Other  Confirm Follow Up Transport: Family  Discharge Location  Patient Expects to be Discharged to[de-identified] Home with home health

## 2022-09-02 NOTE — H&P
[facility] [address] [place] [address]   Office [phone]  Fax [phone]       Admission H&P     Name: Amelia Landaverde MRN: [id number]  Sex: Female   YOB: 1955  Age: 61 y.o. PCP: Alonzo Wesley DO     Source of Information: patient, medical records    Chief complaint: nausea, vomiting, and dizziness    History of Present Illness  Amelia Landaverde is a 61 y.o. female with PMHx CAD s/p MI (2012) s/p 2 SAL, HFpEF, T2DM, ESRD on PD, GERD, HTN, JASEN on CPAP, pHTN, DVT of RLE (2019) on Warfarin, Gout, ILD 2/2, former smoker with chronic hypoxia on 2L O2 who presents to the ER complaining of: Nausea, vomiting, dizziness, lightheadedness for \"several weeks\". She has not been able to keep down food or water recently. She was put on Zofran previously but has not been able to keep this down either, per pt. Vomit consists of liquid as well as other stomach contents. Nonbloody. Patient denies any fever, chills, or diarrhea. Patient endorses decreased appetite, fatigue as well as epigastric abdominal pain for several weeks. Patient is on daily PD; has not done PD yet for 9/1. Of Note: Patient was admitted at [place] [address] for approximately 2 weeks for congestive heart failure exasperation. Hemodialysis and PD was done at the time. Per patient, over 40 pounds of fluid was taken off.      In the ER:      Vitals:  Patient Vitals for the past 8 hrs:   Temp Pulse Resp BP SpO2   09/01/22 2129 -- 93 15 -- 100 %   09/01/22 2128 -- 93 18 -- 100 %   09/01/22 2100 -- -- -- -- 100 %   09/01/22 2039 -- 87 12 113/81 --   09/01/22 2024 -- 88 10 116/66 --   09/01/22 2009 -- -- -- -- 100 %   09/01/22 1807 -- 87 12 (!) 141/63 --   09/01/22 1803 -- 97 21 (!) 153/40 --   09/01/22 1748 -- 86 14 (!) 148/71 --   09/01/22 1713 98.1 °F (36.7 °C) (!) 102 19 118/82 --         Labs:   Recent Labs     09/01/22  1727   WBC 13.3*   HGB 9.3*   HCT 28.7*        Recent Labs     09/01/22  1727   NA 131*   K 2.8*   CL 92*   CO2 27   BUN 56*   CREA 10.50*   *   CA 9.1     Recent Labs     09/01/22  1727      TP 6.8   ALB 2.6*   GLOB 4.2*   LPSE 662*     No results for input(s): INR, PTP, APTT, INREXT, INREXT in the last 72 hours. No results for input(s): PHI, PCO2I, PO2I, FIO2I in the last 72 hours. No results for input(s): CPK, CKMB, TROIQ, BNPP in the last 72 hours. Imaging:   CXR:  CXR Results  (Last 48 hours)      None            CT: CT Results  (Last 48 hours)                 09/01/22 1834  CT ABD PELV WO CONT Final result    Impression:  1. Increasing moderate volume ascites. Right abdominal peritoneal catheter. 2.  Grossly unchanged right mid renal indeterminate lesion in several additional   bilateral likely cystic lesions as described above. 3.  Bilateral nonobstructive nephrolithiasis. 4.  Cholecystectomy. 5.  Evidence of chronic interstitial lung disease. Narrative:  EXAM: CT ABD PELV WO CONT       INDICATION: epigastric pain, elevated lipase        COMPARISON: CT abdomen and pelvis 7/23/2022       IV CONTRAST: None. ORAL CONTRAST: None       TECHNIQUE:    Thin axial images were obtained through the abdomen and pelvis. Coronal and   sagittal reformats were generated. CT dose reduction was achieved through use of   a standardized protocol tailored for this examination and automatic exposure   control for dose modulation. The absence of intravenous contrast material reduces the sensitivity for   evaluation of the vasculature and solid organs. FINDINGS:    LOWER THORAX: Coronary artery calcifications. Redemonstrated bronchiectasis,   scarring, and patchy groundglass in the lung bases, likely chronic interstitial   lung disease   LIVER: No definite mass. Calcified granulomas. BILIARY TREE: Cholecystectomy. CBD is not dilated. SPLEEN: Calcified granulomas. PANCREAS: No focal abnormality. ADRENALS: Unremarkable.     KIDNEYS/URETERS: No hydronephrosis bilaterally. Multiple bilateral nodular   calculi again noted. Stable 5.9 cm x 5.5 cm indeterminate soft tissue density   lesion in the mid right kidney. Several stable fluid density lesions bilaterally   and high density lesions in the left kidney may reflect simple and   hemorrhagic/proteinaceous cysts, respectively. STOMACH: Unremarkable. SMALL BOWEL: No dilatation or wall thickening. COLON: No dilatation. Rectal wall thickening with prominent surrounding fat   stranding. APPENDIX: Not discretely identified. PERITONEUM: Right ventral approach catheter terminates in the right paracolic   gutter. Increasing Moderate ascites. No pneumoperitoneum. RETROPERITONEUM: No lymphadenopathy or aortic aneurysm. Atherosclerosis. REPRODUCTIVE ORGANS: Hysterectomy. URINARY BLADDER: No mass or calculus. BONES: No destructive bone lesion. Degenerative changes. ABDOMINAL WALL: No mass or hernia. ADDITIONAL COMMENTS: N/A                   EKG: Ordered. Review of Systems  Review of Systems   Constitutional:  Positive for appetite change. Negative for fever. HENT:  Negative for congestion and sore throat. Respiratory:  Negative for cough, shortness of breath and wheezing. Cardiovascular:  Negative for palpitations and leg swelling. Gastrointestinal:  Positive for abdominal pain, constipation, nausea and vomiting. Negative for blood in stool and diarrhea. Genitourinary:  Negative for flank pain and hematuria. Musculoskeletal:  Negative for arthralgias and myalgias. Skin:  Positive for wound. Negative for rash. Neurological:  Positive for dizziness and light-headedness. Negative for syncope. Psychiatric/Behavioral:  Negative for agitation and confusion. The patient is not nervous/anxious. Home Medications   Prior to Admission medications    Medication Sig Start Date End Date Taking?  Authorizing Provider   warfarin (COUMADIN) 2.5 mg tablet Take 2.5 mg by mouth every Tuesday, Thursday, Saturday & Sunday. Yes Provider, Historical   warfarin (COUMADIN) 1 mg tablet Take 1 mg by mouth every Monday, Wednesday, Friday. Alternate with 2.5mg on tues, thurs, sat, sun   Yes Provider, Historical   clotrimazole (MYCELEX) 10 mg kevin Take 1 Tablet by mouth five (5) times daily for 10 days. 8/25/22 9/4/22  Ahsan Levine, DO   midodrine (PROAMATINE) 2.5 mg tablet Take 1 Tablet by mouth two (2) times a day for 30 days. 8/25/22 9/24/22  Jeris Holstein, DO   nitroglycerin (NITROSTAT) 0.4 mg SL tablet 1 Tablet by SubLINGual route every five (5) minutes as needed for Chest Pain. Up to 3 doses. 8/25/22   Hallie Levine DO   triamcinolone acetonide (KENALOG) 0.1 % ointment Apply  to affected area two (2) times a day. use thin layer 8/25/22   Hallie Levine, DO   oxyCODONE-acetaminophen (PERCOCET 10)  mg per tablet Take 1 Tablet by mouth every eight (8) hours as needed for Pain for up to 30 days. Max Daily Amount: 3 Tablets. 9/9/22 9/1/22  Jeris Holstein, DO   folic acid (FOLVITE) 1 mg tablet Take 1 Tablet by mouth in the morning. 7/27/22   Fatoumata Sandoval MD   potassium chloride (K-DUR, KLOR-CON M20) 20 mEq tablet Take 1 Tablet by mouth in the morning. 7/26/22   Fatoumata Sandoval MD   carvediloL (COREG) 6.25 mg tablet Take 1 Tablet by mouth two (2) times daily (with meals). 7/6/22   Pasty Goldberg E, PA-C   ondansetron (ZOFRAN ODT) 4 mg disintegrating tablet Take 1 Tablet by mouth every eight (8) hours as needed for Nausea or Vomiting. 5/28/22   Rogelio Begun, DO   isosorbide mononitrate ER (IMDUR) 120 mg CR tablet Take 0.5 Tablets by mouth every morning. 5/5/22   Hallie Levine, DO   atorvastatin (LIPITOR) 80 mg tablet TAKE 1 TABLET BY MOUTH ONCE DAILY NIGHTLY 5/5/22   Hallie Levine DO   allopurinoL (ZYLOPRIM) 100 mg tablet Take 1 tablet by mouth once daily 5/5/22   Hallie Levine DO   CALCITRIOL PO Take 0.5 mg by mouth.     Provider, Historical   sucralfate (CARAFATE) 1 gram tablet Take 1 tablet by mouth 4 times daily 11/12/21   Lobb, Hallie SANDOVAL, DO   docusate sodium (STOOL SOFTENER PO) Take  by mouth daily. Patient not taking: No sig reported    Provider, Historical   albuterol (PROAIR HFA) 90 mcg/actuation inhaler Take 2 Puffs by inhalation every four (4) hours as needed for Wheezing. 11/14/19   Lobb, Deric William, DO   Oxygen O2 2L NC 24/7    Provider, Historical       Allergies  Allergies   Allergen Reactions    Contrast Dye [Iodine] Anaphylaxis     Tolerates when pre medicated w/ IV solumedrol and benadryl prior to procedure     Shellfish Derived Anaphylaxis    Levaquin [Levofloxacin] Nausea and Vomiting    Morphine Hives and Itching    Nsaids (Non-Steroidal Anti-Inflammatory Drug) Nausea and Vomiting       Past Medical History  Past Medical History:   Diagnosis Date     Sleep Apnea 2/16/2011    Compliant with c-pap. Aortic aneurysm (HCC)     Abdominal    CAD (coronary Artery Disease) Native Artery 2/16/2011    Chronic kidney disease     Hemodiaylsis  3x/week    Chronic pain     CKD (chronic kidney disease) stage 4, GFR 15-29 ml/min ([facility]) 2/10/2017    Diabetic gastroparesis (HCC)     Diastolic heart failure ([address] 10/5/2012    DM type 2 causing neurological disease ([address]     DM type 2 causing renal disease (HCC)     Insulin SS at night only PRN    DM type 2 causing vascular disease ([address]     Esophageal stricture 2012    dialted Dr. Magali Obando    G6PD deficiency     trait    GERD (gastroesophageal reflux disease)     Gout     Heart failure ([address]     Hemodialysis access, AV graft ([address] 04/02/2017    Dialysis MWF    [address]      Hypertension     ILD (interstitial lung disease) ([address]     open lung bx CJW 2010    Infected dental caries 3/17/2020    Infection of total right knee replacement ([address] 3/17/2020    Loss of appetite 3/17/2020    Morbid obesity ([address]     OA (osteoarthritis)     Ovarian cancer ([address]     cervical and uterine    Rheumatoid arteritis (HCC)     S/P left pulmonary artery pressure sensor implant placement 2017    Cardiomems     Thromboembolus ([facility]     Right calf     Tobacco use disorder 3/21/2012    Uterine cervix cancer ([facility]     Vitamin D deficiency 2013        Previous Hospitalization(s)  Past Surgical History:   Procedure Laterality Date    COLONOSCOPY N/A 2016    COLONOSCOPY performed by Mignon Quarles MD at [address]    HX ADENOIDECTOMY      HX APPENDECTOMY      HX [address]      bilateral    HX CHOLECYSTECTOMY      HX HEART CATHETERIZATION      x 7    HX HERNIA REPAIR      HX HYSTERECTOMY      HX ORTHOPAEDIC Right 12    right knee replacement    HX ORTHOPAEDIC Bilateral     carpal tunnel repair    HX ORTHOPAEDIC Right     PLATE IN ANKLE DUE TO FRACTURE    HX SALPINGO-OOPHORECTOMY      HX TONSIL AND ADENOIDECTOMY      HX TONSILLECTOMY      HX VASCULAR ACCESS Left     Left lower arm AV fistula    IR INSERT PERITONEAL VENOUS SHUNT      KS CARDIAC SURG PROCEDURE UNLIST  02/2019    x4 with last sttent placed 2019.     KS CHEST SURGERY PROCEDURE UNLISTED Right     > 20 years ago  RLL removed     UPPER GI ENDOSCOPY,DILATN W GUIDE  2016            Social History  Social History     Socioeconomic History    Marital status:      Spouse name: Not on file    Number of children: Not on file    Years of education: Not on file    Highest education level: Not on file   Occupational History    Not on file   Tobacco Use    Smoking status: Former     Packs/day: 0.50     Years: 41.00     Pack years: 20.50     Types: Cigarettes     Quit date: 2014     Years since quittin.8    Smokeless tobacco: Never   Vaping Use    Vaping Use: Never used   Substance and Sexual Activity    Alcohol use: No    Drug use: No    Sexual activity: Not on file   Other Topics Concern    Not on file   Social History Narrative    Not on file     Social Determinants of Health     Financial Resource Strain: Low Risk     Difficulty of Paying Living Expenses: Not hard at all   Food Insecurity: No Food Insecurity    Worried About Running Out of Food in the Last Year: Never true    Ran Out of Food in the Last Year: Never true   Transportation Needs: Not on file   Physical Activity: Not on file   Stress: Not on file   Social Connections: Not on file   Intimate Partner Violence: Not on file   Housing Stability: Not on file       Family History  Family History   Problem Relation Age of Onset    Heart Disease Mother     No Known Problems Daughter     No Known Problems Son     No Known Problems Son     Anesth Problems Neg Hx           Patient resides  x  Independently      With family care      Assisted living      SNF      Ambulates    Independently    x  With cane       Assisted walker      Alcohol history   x  None     Social     Chronic     Smoking history    None   x  Former smoker     Current smoker     Drug history  x  None     Former drug user     Current drug user     Code status  x  Full code     DNR/DNI     Partial      Code status discussed with the patient/caregivers. Physical Exam  General: Uncomfortable in appearance. Alert. Cooperative. Head: Normocephalic. Atraumatic. Eyes:              Conjunctiva pink. Sclera white. .   Throat: Moist mucous membranes. No tonsillar exudates or erythema. Neck: No lymphadenopathy. Respiratory: CTAB. No w/r/r/c. No accessory muscle use. Cardiovascular: Regular Rhythm. No m/r/g.    GI: + bowel sounds. No rebound/guarding. Non-distended. Epigastric tenderness on light palpation   Extremities: no LE edema. Distal pulses intact. Musculoskeletal: Full ROM in all extremities. Skin: Warm, dry. No rashes. Neuro: Alert and oriented. No focal deficits.                   Assessment and Plan     Jennifer Cooper is a 61 y.o. 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 Intractable nausea and vomiting with epigastric abd pain: Lipase 662. CT Abd/Pel showed increasing moderate volume ascites. Unchanged bilateral nonobstructive nephrolithiasis. Low suspicion for SBP as abdominal exam was relatively benign. No signs of peritonitis on physical exam.  Possiblly 2/2 ascites or possible gastroparesis. Epigastric abdominal pain possibly due to Pancreatic inflammation vs gastritis vs gastroenterities. Gastroenteritis is less likely due to absence of diarrhea. Admit to remote telemetry  -VS per unit protocol  -Zofran for Nausea   -GI consulted, appreciate recommendations  -Nephrology consulted, appreciate recommendations  -Rosangela hydration at 50 ml/hr     Chronic hypokalemia: Baseline 3.3. On potassium chloride 20 meq daily at home. POA potassium 2.8. Last potassium was 4.2 on 8/8. Likely secondary to continued nausea vomiting.   -Continue home potassium chloride 20 meq daily   -One time dose potassium 10 meq IV (want to be careful given patient's hx of ESRD). -EKG ordered    Hypotension in the setting of HTN: recently taken off of home carvedilol 6.25 mg and Imdur 120 mg CR by her PCP due to low BPs. Also started on midodrine 2.5 mg BID. -Hold home carvedilol 6.25 mg and Imdur 120 mg CR  -Continue midodrine 2.5 mg BID  -Will continue to monitor at this time and readjust as BP's trend. ESRD on PD:  Follows with Dr Matt Ward (nephrology). -Consult nephrology, appreciate recommendations  -Avoid Nephrotoxins  -Renally dose medications  -Continue Calcitriol 0.5mg daily    Coronary artery disease with history of MI: 1 SAL to OM 2004, MI in 2012 s/p 2 SAL to OM2/OM3. Has chronic proximal occlusion with L to R collaterals. Follow[facility] (cardiologist). - Remote teli  - Echo on 9/2    Chronic HFpEF: Per chart review last echo in 2019 (EF 66-70%).  Pt reports recent admission to outside hospital for diuresis. E-Echo on 9/2  -Monitor for signs/symptoms of HF    Obstructive sleep apnea: Uses CPAP nightly. Well-controlled at this time. Former smoker: 0.5 PPD for 41 years for 20.5-pack-year history. Quit on 11/9/2014. Interstitial lung disease: With chronic hypoxia on 2 L oxygen via nasal cannula at home. Biopsy negative for sarcoid per patient. Attributed to smoking history. - Albuterol PRN    Chronic sacral ulcer: painful.   -Consult inpatient wound care    T2DM with CKD: Last HgA1C  4.7. Not on any medications currently. POA glucose 126. Lab Results   Component Value Date/Time    Hemoglobin A1c 4.7 03/25/2022 03:07 PM    Hemoglobin A1c, External 5.0 05/19/2021 12:00 AM    - Monitor on daily labs    GERD Stable. -Continue home sucralfate 1g 4 times daily    Hx of DVT: Of right leg. No recurrence. Home warfarin 1 mg MWF with 2.5 mg Tue Thur Sat.   -Pharmacy to dose warfarin    Obesity: PT with BMI of Body mass index is 38.74 kg/m². - Encouraging lifestyle modifications and further follow up outpatient. History of gout: Takes allopurinol 100 mg daily. Has not had a recent flare.   -Continue home allopurinol 100 mg daily    Intermittent constipation: Takes docusate sodium daily. Last BM on 9/1.   -MiraLAX PRN    Hyperlipidemia: Total 100, TG 78, HDL 51, LDL 33.4. Currently well controlled on home Lipitor 80 mg nightly.   -Continue Lipitor 80 mg nightly    FEN/GI - Regular diet. Low-sodium NS at 50 mL/hr. Activity - Out of bed with assistance  DVT prophylaxis - SCDs . On warfarin  GI prophylaxis - Not indicated at this time  Fall prophylaxis - Not indicated at this time. Disposition - Admit to Remote Telemetry. Plan to d/c to Home. Code Status - Full, discussed with patient / caregivers. Next of Kin Name and Dario Deras ([phone])      Patient Gracie Magaña will be discussed with Dr. Camille Chavez.     9:58 PM, 09/01/22  Nohemi Kauffman MD  Family Medicine Resident       For Billing    Chief Complaint   Patient presents with    Nausea    Vomiting    Fatigue       Hospital Problems  Date Reviewed: 8/25/2022            Codes Class Noted POA    Abdominal pain ICD-10-CM: R10.9  ICD-9-CM: 789.00  7/23/2022 Unknown

## 2022-09-02 NOTE — PROGRESS NOTES
[facility] Pharmacy Dosing Services:9/2/22    Consult for Warfarin Dosing by Pharmacy by Dr. Angela Bowers provided for this 61 y.o.  female , for indication of History of VTE (confirmed no current VTE)  Day of Therapy: Resumed from PTA medication list  Home Regimen: Warfarin 1 mg on MWF, 2.5 mg on TTSS  Dose to achieve an INR goal of 2-3    Order entered to hold warfarin for an INR of 8.5    Significant drug interactions: allopurinol (home med)  Previous dose given PTA 9/1: 2.5 mg   PT/INR Lab Results   Component Value Date/Time    INR 8.5 (HH) 09/02/2022 03:47 AM      Platelets Lab Results   Component Value Date/Time    PLATELET 490 23/63/3827 03:47 AM      H/H Lab Results   Component Value Date/Time    HGB 8.6 (L) 09/02/2022 03:47 AM        Pharmacy to follow daily and will provide subsequent Warfarin dosing based on clinical status.   Abdulaziz Stokes, [place])  Contact information [phone]

## 2022-09-02 NOTE — PROGRESS NOTES
[facility] Pharmacy Dosing Services: 09/01/22    Consult for Warfarin Dosing by Pharmacy by Dr. Reece Romero provided for this 61 y.o.  female , for indication of History of VTE (confirmed no current VTE)  Day of Therapy: Resumed from PTA medication list  Home Regimen: Warfarin 1 mg on MWF, 2.5 mg on TTSS  Dose to achieve an INR goal of 2-3    Assessment/Plan:  Spoke to patient via phone to confirm current warfarin regimen. Updated PTA medication list. She already took today's Thursday dose of 2.5 mg around noon. She states she last had INR checked last Thursday - from [address] I see INR was 3.2 on 8/25. From office notes, I see she was instructed to hold for 2 days and then restart usual regimen. As patient already received dose today, will not give any further dose today on 9/1  There was no INR ordered so far during visit, I will place order to draw INR daily with AM labs starting tomorrow 9/2    Significant drug interactions: Allopurinol  Previous dose given PTA 9/1: 2.5 mg   PT/INR Lab Results   Component Value Date/Time    INR 2.1 (H) 08/08/2022 06:28 PM    INR POC 3.2 08/25/2022 03:11 PM      Platelets Lab Results   Component Value Date/Time    PLATELET 636 83/14/0246 05:27 PM      H/H Lab Results   Component Value Date/Time    HGB 9.3 (L) 09/01/2022 05:27 PM        Pharmacy to follow daily and will provide subsequent Warfarin dosing based on clinical status.   JOHNSON Valdez)  Contact information [phone]

## 2022-09-02 NOTE — WOUND CARE
Wound Consult:  New Patient Visit  Consulted for: inner buttocks/gluteal cleft injury. Patient resting on [employer] bed with accumax. Randy score: 20; on PD; reports continence fecally; moves in bed to lift up and turn independently. Patient is alert and oriented x 4. Assessment:  Distal gluteal cleft - linear bright pink/red denuded skin ~ 2.5 x < 0.1 cm, no discoloration surrounding. Inner buttock bilaterally with a very few tiny areas of pink, denuded skin, intact surrounding, hyperpigmented. Areas appear related to moisture and friction within intertriginous area. No discoloration to heels, no rash or redness in folds. Treatment:  Used no sting barrier film to skin, applied sacral foam.  Wound Recommendations:  Use no sting barrier film to skin on inner buttocks/gluteal area, apply sacral foam; change every three days. Skin and PI Prevention Recommendations:  Turn and reposition ~ every 2 hours. Patient moving in bed, sitting upright. Off load heels: use pillows if needed. Use waffle cushion while in chair. Manage moisture with frequent incontinence checks; intentional toileting; add barrier ointment if needed; reports continence. Continue to monitor risk assessment with Randy score; Dual skin assessment and changes in condition. Promote nutrition; consult if needed. Plan:  Discussed with patient's nurse at bedside. Call out to family practice. .  We will continue to reassess routinely. Please re-consult should any concerns arise prior to next visit. Davie Nuno RN,[facility]    Wound and Ostomy Department.   [phone] wound nurse or [employer]

## 2022-09-03 LAB
ANION GAP SERPL CALC-SCNC: 9 MMOL/L (ref 5–15)
BASOPHILS # BLD: 0 K/UL (ref 0–0.1)
BASOPHILS NFR BLD: 0 % (ref 0–1)
BUN SERPL-MCNC: 53 MG/DL (ref 6–20)
BUN/CREAT SERPL: 5 (ref 12–20)
CALCIUM SERPL-MCNC: 8.3 MG/DL (ref 8.5–10.1)
CHLORIDE SERPL-SCNC: 99 MMOL/L (ref 97–108)
CO2 SERPL-SCNC: 25 MMOL/L (ref 21–32)
CREAT SERPL-MCNC: 9.87 MG/DL (ref 0.55–1.02)
DIFFERENTIAL METHOD BLD: ABNORMAL
EOSINOPHIL # BLD: 0.6 K/UL (ref 0–0.4)
EOSINOPHIL NFR BLD: 5 % (ref 0–7)
ERYTHROCYTE [DISTWIDTH] IN BLOOD BY AUTOMATED COUNT: 14.5 % (ref 11.5–14.5)
GLUCOSE SERPL-MCNC: 101 MG/DL (ref 65–100)
HCT VFR BLD AUTO: 22.2 % (ref 35–47)
HCT VFR BLD AUTO: 23 % (ref 35–47)
HGB BLD-MCNC: 7.1 G/DL (ref 11.5–16)
HGB BLD-MCNC: 7.3 G/DL (ref 11.5–16)
IMM GRANULOCYTES # BLD AUTO: 0.1 K/UL (ref 0–0.04)
IMM GRANULOCYTES NFR BLD AUTO: 1 % (ref 0–0.5)
INR PPP: 2.3 (ref 0.9–1.1)
LYMPHOCYTES # BLD: 2.3 K/UL (ref 0.8–3.5)
LYMPHOCYTES NFR BLD: 20 % (ref 12–49)
MCH RBC QN AUTO: 30.7 PG (ref 26–34)
MCHC RBC AUTO-ENTMCNC: 31.7 G/DL (ref 30–36.5)
MCV RBC AUTO: 96.6 FL (ref 80–99)
MONOCYTES # BLD: 0.8 K/UL (ref 0–1)
MONOCYTES NFR BLD: 7 % (ref 5–13)
NEUTS SEG # BLD: 8 K/UL (ref 1.8–8)
NEUTS SEG NFR BLD: 67 % (ref 32–75)
NRBC # BLD: 0 K/UL (ref 0–0.01)
NRBC BLD-RTO: 0 PER 100 WBC
PLATELET # BLD AUTO: 191 K/UL (ref 150–400)
PMV BLD AUTO: 11.5 FL (ref 8.9–12.9)
POTASSIUM SERPL-SCNC: 3.1 MMOL/L (ref 3.5–5.1)
PROTHROMBIN TIME: 22.5 SEC (ref 9–11.1)
RBC # BLD AUTO: 2.38 M/UL (ref 3.8–5.2)
SODIUM SERPL-SCNC: 133 MMOL/L (ref 136–145)
WBC # BLD AUTO: 11.8 K/UL (ref 3.6–11)

## 2022-09-03 PROCEDURE — 94660 CPAP INITIATION&MGMT: CPT

## 2022-09-03 PROCEDURE — 74011250637 HC RX REV CODE- 250/637: Performed by: STUDENT IN AN ORGANIZED HEALTH CARE EDUCATION/TRAINING PROGRAM

## 2022-09-03 PROCEDURE — 80048 BASIC METABOLIC PNL TOTAL CA: CPT

## 2022-09-03 PROCEDURE — A4726 DIALYS SOL FLD VOL > 5999CC: HCPCS | Performed by: INTERNAL MEDICINE

## 2022-09-03 PROCEDURE — 65270000029 HC RM PRIVATE

## 2022-09-03 PROCEDURE — 74011250637 HC RX REV CODE- 250/637

## 2022-09-03 PROCEDURE — 94760 N-INVAS EAR/PLS OXIMETRY 1: CPT

## 2022-09-03 PROCEDURE — 74011250636 HC RX REV CODE- 250/636

## 2022-09-03 PROCEDURE — 74011250637 HC RX REV CODE- 250/637: Performed by: INTERNAL MEDICINE

## 2022-09-03 PROCEDURE — 74011250636 HC RX REV CODE- 250/636: Performed by: INTERNAL MEDICINE

## 2022-09-03 PROCEDURE — 74011000258 HC RX REV CODE- 258

## 2022-09-03 PROCEDURE — 94761 N-INVAS EAR/PLS OXIMETRY MLT: CPT

## 2022-09-03 PROCEDURE — 74011250636 HC RX REV CODE- 250/636: Performed by: STUDENT IN AN ORGANIZED HEALTH CARE EDUCATION/TRAINING PROGRAM

## 2022-09-03 PROCEDURE — C9113 INJ PANTOPRAZOLE SODIUM, VIA: HCPCS | Performed by: STUDENT IN AN ORGANIZED HEALTH CARE EDUCATION/TRAINING PROGRAM

## 2022-09-03 PROCEDURE — 85610 PROTHROMBIN TIME: CPT

## 2022-09-03 PROCEDURE — 85025 COMPLETE CBC W/AUTO DIFF WBC: CPT

## 2022-09-03 PROCEDURE — 99233 SBSQ HOSP IP/OBS HIGH 50: CPT | Performed by: FAMILY MEDICINE

## 2022-09-03 PROCEDURE — 77010033678 HC OXYGEN DAILY

## 2022-09-03 PROCEDURE — 65270000046 HC RM TELEMETRY

## 2022-09-03 PROCEDURE — 74011000250 HC RX REV CODE- 250

## 2022-09-03 PROCEDURE — 85018 HEMOGLOBIN: CPT

## 2022-09-03 PROCEDURE — 74011000250 HC RX REV CODE- 250: Performed by: STUDENT IN AN ORGANIZED HEALTH CARE EDUCATION/TRAINING PROGRAM

## 2022-09-03 PROCEDURE — 36415 COLL VENOUS BLD VENIPUNCTURE: CPT

## 2022-09-03 RX ORDER — ONDANSETRON 2 MG/ML
8 INJECTION INTRAMUSCULAR; INTRAVENOUS EVERY 6 HOURS
Status: DISCONTINUED | OUTPATIENT
Start: 2022-09-03 | End: 2022-09-10 | Stop reason: HOSPADM

## 2022-09-03 RX ORDER — POTASSIUM CHLORIDE 7.45 MG/ML
10 INJECTION INTRAVENOUS
Status: COMPLETED | OUTPATIENT
Start: 2022-09-03 | End: 2022-09-03

## 2022-09-03 RX ADMIN — GENTAMICIN SULFATE: 1 OINTMENT TOPICAL at 09:53

## 2022-09-03 RX ADMIN — ONDANSETRON 8 MG: 2 INJECTION INTRAMUSCULAR; INTRAVENOUS at 23:54

## 2022-09-03 RX ADMIN — POTASSIUM CHLORIDE 10 MEQ: 7.45 INJECTION INTRAVENOUS at 14:13

## 2022-09-03 RX ADMIN — SODIUM CHLORIDE, PRESERVATIVE FREE 40 MG: 5 INJECTION INTRAVENOUS at 09:58

## 2022-09-03 RX ADMIN — SUCRALFATE 1 G: 1 TABLET ORAL at 11:34

## 2022-09-03 RX ADMIN — ALLOPURINOL 100 MG: 100 TABLET ORAL at 09:53

## 2022-09-03 RX ADMIN — SODIUM CHLORIDE, SODIUM LACTATE, CALCIUM CHLORIDE, MAGNESIUM CHLORIDE AND DEXTROSE 6000 ML: 1.5; 538; 448; 18.3; 5.08 INJECTION, SOLUTION INTRAPERITONEAL at 16:47

## 2022-09-03 RX ADMIN — Medication 5 ML: at 05:52

## 2022-09-03 RX ADMIN — NITROGLYCERIN 0.4 MG: 0.4 TABLET, ORALLY DISINTEGRATING SUBLINGUAL at 03:25

## 2022-09-03 RX ADMIN — SODIUM CHLORIDE, PRESERVATIVE FREE 10 ML: 5 INJECTION INTRAVENOUS at 09:57

## 2022-09-03 RX ADMIN — ONDANSETRON 4 MG: 2 INJECTION INTRAMUSCULAR; INTRAVENOUS at 05:29

## 2022-09-03 RX ADMIN — SUCRALFATE 1 G: 1 TABLET ORAL at 16:49

## 2022-09-03 RX ADMIN — NITROGLYCERIN 0.4 MG: 0.4 TABLET, ORALLY DISINTEGRATING SUBLINGUAL at 18:53

## 2022-09-03 RX ADMIN — POTASSIUM CHLORIDE 20 MEQ: 750 TABLET, FILM COATED, EXTENDED RELEASE ORAL at 09:52

## 2022-09-03 RX ADMIN — EPOETIN ALFA-EPBX 10000 UNITS: 10000 INJECTION, SOLUTION INTRAVENOUS; SUBCUTANEOUS at 21:19

## 2022-09-03 RX ADMIN — CALCITRIOL CAPSULES 0.25 MCG 0.5 MCG: 0.25 CAPSULE ORAL at 09:52

## 2022-09-03 RX ADMIN — FOLIC ACID 1 MG: 1 TABLET ORAL at 09:53

## 2022-09-03 RX ADMIN — MIDODRINE HYDROCHLORIDE 2.5 MG: 5 TABLET ORAL at 18:55

## 2022-09-03 RX ADMIN — ONDANSETRON 8 MG: 2 INJECTION INTRAMUSCULAR; INTRAVENOUS at 11:35

## 2022-09-03 RX ADMIN — SUCRALFATE 1 G: 1 TABLET ORAL at 06:36

## 2022-09-03 RX ADMIN — ATORVASTATIN CALCIUM 80 MG: 20 TABLET, FILM COATED ORAL at 21:05

## 2022-09-03 RX ADMIN — MIDODRINE HYDROCHLORIDE 2.5 MG: 5 TABLET ORAL at 09:52

## 2022-09-03 RX ADMIN — Medication 5 ML: at 22:23

## 2022-09-03 RX ADMIN — SUCRALFATE 1 G: 1 TABLET ORAL at 21:05

## 2022-09-03 RX ADMIN — POTASSIUM CHLORIDE 10 MEQ: 7.45 INJECTION INTRAVENOUS at 12:48

## 2022-09-03 RX ADMIN — PHYTONADIONE 2.5 MG: 10 INJECTION, EMULSION INTRAMUSCULAR; INTRAVENOUS; SUBCUTANEOUS at 09:54

## 2022-09-03 RX ADMIN — SODIUM CHLORIDE, PRESERVATIVE FREE 20 ML: 5 INJECTION INTRAVENOUS at 16:49

## 2022-09-03 RX ADMIN — ONDANSETRON 8 MG: 2 INJECTION INTRAMUSCULAR; INTRAVENOUS at 18:54

## 2022-09-03 RX ADMIN — POTASSIUM CHLORIDE 10 MEQ: 7.45 INJECTION INTRAVENOUS at 11:35

## 2022-09-03 RX ADMIN — POTASSIUM CHLORIDE 10 MEQ: 7.45 INJECTION INTRAVENOUS at 09:58

## 2022-09-03 NOTE — PROGRESS NOTES
Bedside shift change report given to [facility] (oncoming nurse) by Jess Peraza (offgoing nurse). Report included the following information SBAR, Kardex, Intake/Output, MAR, and Recent Results.

## 2022-09-03 NOTE — PROGRESS NOTES
Problem: Falls - Risk of  Goal: *Absence of Falls  Description: Document Lor Prescott Fall Risk and appropriate interventions in the flowsheet.   Outcome: Progressing Towards Goal  Note: Fall Risk Interventions:  Mobility Interventions: Patient to call before getting OOB         Medication Interventions: Evaluate medications/consider consulting pharmacy, Patient to call before getting OOB, Teach patient to arise slowly    Elimination Interventions: Call light in reach, Stay With Me (per policy)    History of Falls Interventions: Bed/chair exit alarm

## 2022-09-03 NOTE — PROGRESS NOTES
Problem: Falls - Risk of  Goal: *Absence of Falls  Description: Document Timbo Suh Fall Risk and appropriate interventions in the flowsheet.   Outcome: Progressing Towards Goal  Note: Fall Risk Interventions:  Mobility Interventions: Bed/chair exit alarm, Patient to call before getting OOB         Medication Interventions: Patient to call before getting OOB, Teach patient to arise slowly         History of Falls Interventions: Bed/chair exit alarm         Problem: Patient Education: Go to Patient Education Activity  Goal: Patient/Family Education  Outcome: Progressing Towards Goal

## 2022-09-03 NOTE — DIALYSIS
[address]       [phone]            Pt orders, notes, labs, code status and consent reviewed. Time out complete. Last Fill: 0   I-Drain: 1345 ml   Total UF: 167 ml   Net Output: 1512 ml   Added Dwell Time: 0   Lost Dwell Time: 2 mins   Average Dwell Time: 1 hr 39 mins   Effluent: Yellow/clear         At patient bedside for disconnection from PD cycler. Catheter scrubbed/soaked with Alcavis for two minutes prior to disconnection followed by sterile mini cap application. Line secured to abdomen. Old cassette/bags discarded in red biohazard.  Education pre/post. Bed in lowest position, call bell and personal belongings within reach  SBAR to Primary, RN

## 2022-09-03 NOTE — PROGRESS NOTES
Samuel Gomez [address], Tracey Estes [address]   Office [phone]  Fax [phone]          Subjective / Objective     Subjective  Overnight Events:  Had CP consistent with her chronic angina. Her home prn nitro was ordered. Repeat trop flat (54-->49), EKG sinus tach. Patient seen and examined at bedside, looks uncomfortable. Reports continued nausea with vomiting/spit up, epigastric pain. Continued chronic CP. Endorses HA and dizziness. Denies SOB, cough, URI sx. Does not want any antiemetics other than zofran, as they reportedly make her \"loopy. \"    Respiratory: O2 Device: Nasal cannula 2L, sating 100%  Visit Vitals  /68 (BP 1 Location: Right lower arm, BP Patient Position: At rest)   Pulse 67   Temp 98.2 °F (36.8 °C)   Resp 18   Ht 5' 10\" (1.778 m)   Wt 270 lb 1 oz (122.5 kg)   SpO2 90%   BMI 38.75 kg/m²     Physical Examination:   General appearance - alert, in no acute distress, appears uncomfortable, hunched over. Chest - clear to auscultation, no wheezes, rales or rhonchi, symmetric air entry  Heart - normal rate, regular rhythm, normal S1, S2, no murmurs, rubs, clicks or gallops  Abdomen - soft,, nondistended, no masses or organomegaly. Epigastric TTP. Neurological - alert, oriented, normal speech, no focal findings  Skin - warm, dry. No notable rashes  Extremities - No clubbing or cyanosis. Moving all extremities spontaneously.   Psychiatric - normal speech and thought processes    I/O:  09/02 0701 - 09/03 0700  In: 1659.6 [I.V.:1659.6]  Out: 1512   Inpatient Medications  Current Facility-Administered Medications   Medication    Warfarin Pharmacy to dose    Warfarin no dose 9/3/22 due to INR of >13.5    ondansetron (ZOFRAN) injection 8 mg    potassium chloride 10 mEq in 100 ml IVPB    pantoprazole (PROTONIX) 40 mg in 0.9% sodium chloride 10 mL injection    gentamicin (GARAMYCIN) 0.1 % ointment    peritoneal dialysis DEXTROSE 1.5% (2.5 mEq/L low calcium) solution 6,000 mL peritoneal dialysis DEXTROSE 1.5% (2.5 mEq/L low calcium) solution 6,000 mL    gentamicin (GARAMYCIN) 0.1 % cream    nitroglycerin (NITROSTAT) tablet 0.4 mg    sodium chloride (NS) flush 5-40 mL    sodium chloride (NS) flush 5-40 mL    acetaminophen (TYLENOL) tablet 650 mg    Or    acetaminophen (TYLENOL) suppository 650 mg    polyethylene glycol (MIRALAX) packet 17 g    0.9% sodium chloride infusion    albuterol-ipratropium (DUO-NEB) 2.5 MG-0.5 MG/3 ML    allopurinoL (ZYLOPRIM) tablet 100 mg    atorvastatin (LIPITOR) tablet 80 mg    calcitRIOL (ROCALTROL) capsule 0.5 mcg    folic acid (FOLVITE) tablet 1 mg    midodrine (PROAMATINE) tablet 2.5 mg    potassium chloride SR (KLOR-CON 10) tablet 20 mEq    sucralfate (CARAFATE) tablet 1 g     Current Facility-Administered Medications   Medication Dose Route Frequency    Warfarin Pharmacy to dose   Other Rx Dosing/Monitoring    Warfarin no dose 9/3/22 due to INR of >13.5   Other ONCE    ondansetron (ZOFRAN) injection 8 mg  8 mg IntraVENous Q6H    potassium chloride 10 mEq in 100 ml IVPB  10 mEq IntraVENous Q1H    pantoprazole (PROTONIX) 40 mg in 0.9% sodium chloride 10 mL injection  40 mg IntraVENous DAILY    gentamicin (GARAMYCIN) 0.1 % ointment   Topical DAILY    peritoneal dialysis DEXTROSE 1.5% (2.5 mEq/L low calcium) solution 6,000 mL  6,000 mL IntraPERitoneal DAILY    peritoneal dialysis DEXTROSE 1.5% (2.5 mEq/L low calcium) solution 6,000 mL  6,000 mL IntraPERitoneal DAILY    gentamicin (GARAMYCIN) 0.1 % cream   Topical DIALYSIS PRN    nitroglycerin (NITROSTAT) tablet 0.4 mg  0.4 mg SubLINGual Q5MIN PRN    sodium chloride (NS) flush 5-40 mL  5-40 mL IntraVENous Q8H    sodium chloride (NS) flush 5-40 mL  5-40 mL IntraVENous PRN    acetaminophen (TYLENOL) tablet 650 mg  650 mg Oral Q6H PRN    Or    acetaminophen (TYLENOL) suppository 650 mg  650 mg Rectal Q6H PRN    polyethylene glycol (MIRALAX) packet 17 g  17 g Oral DAILY PRN    0.9% sodium chloride infusion  25 mL/hr IntraVENous CONTINUOUS    albuterol-ipratropium (DUO-NEB) 2.5 MG-0.5 MG/3 ML  3 mL Nebulization Q6H PRN    allopurinoL (ZYLOPRIM) tablet 100 mg  100 mg Oral DAILY    atorvastatin (LIPITOR) tablet 80 mg  80 mg Oral QHS    calcitRIOL (ROCALTROL) capsule 0.5 mcg  0.5 mcg Oral DAILY    folic acid (FOLVITE) tablet 1 mg  1 mg Oral DAILY    midodrine (PROAMATINE) tablet 2.5 mg  2.5 mg Oral BID    potassium chloride SR (KLOR-CON 10) tablet 20 mEq  20 mEq Oral DAILY    sucralfate (CARAFATE) tablet 1 g  1 g Oral AC&HS     Allergies  Allergies   Allergen Reactions    Contrast Dye [Iodine] Anaphylaxis     Tolerates when pre medicated w/ IV solumedrol and benadryl prior to procedure     Shellfish Derived Anaphylaxis    Levaquin [Levofloxacin] Nausea and Vomiting    Morphine Hives and Itching    Nsaids (Non-Steroidal Anti-Inflammatory Drug) Nausea and Vomiting     CBC:  Recent Labs     09/03/22  0301 09/02/22  0347 09/01/22  1727   WBC 11.8* 12.6* 13.3*   HGB 7.3* 8.6* 9.3*    009 928     Metabolic Panel:  Recent Labs     09/03/22  0301 09/02/22  0347 09/01/22  1727   * 133* 131*   K 3.1* 2.9* 2.8*   CL 99 95* 92*   CO2 25 25 27   BUN 53* 56* 56*   CREA 9.87* 10.50* 10.50*   * 123* 126*   CA 8.3* 8.8 9.1   ALB  --   --  2.6*   ALT  --   --  14   INR >13.5* 8.5*  --      Imaging/procedures:   CT ABD PELV WO CONT    Result Date: 9/1/2022  1. Increasing moderate volume ascites. Right abdominal peritoneal catheter. 2.  Grossly unchanged right mid renal indeterminate lesion in several additional bilateral likely cystic lesions as described above. 3.  Bilateral nonobstructive nephrolithiasis. 4.  Cholecystectomy. 5.  Evidence of chronic interstitial lung disease. Assessment and Plan     Alexandr Bellamy is a 61 y.o. female with PMH ESRD on PD, CAD with MI 2012, HFpEF, ILD on 2L home O2, JASEN, GERD, h/o DVT in RLE on warfarin, HTN, HLD, admitted with intractable n/v and epigastric pain. Intractable nausea and vomiting with epigastric abd pain: Lipase 662. CT AP showed increasing moderate volume ascites, unchanged b/l nonobstructive nephrolithiasis. Possibly 2/2 gastroparesis vs CHF vs uremia. Peritoneal fluid cx no growth. GI no need for endoscopy. -Zofran increased to 8mg q6h  -Protonix  -GI consulted, appreciate recommendations  -Nephrology consulted, appreciate recommendations  -Rowdy Obando hydration NS 25 ml/hr      Chronic hypokalemia: Baseline 3.3. POA 2.8-->3.1. On potassium chloride 20 meq daily at home. Likely 2/2 continued nausea and vomiting. EKG wnl.  -Continue home potassium chloride 20 meq daily   -Kcl IV 10mEq x4  -Monitor BMP    Supratherapeutic INR on warfarin: INR 13.5 on 9/3.  -Hold warfarin, pharmacy to dose  -Vit K  -PT/INR daily     Hypotension in the setting of HTN: recently taken off of home carvedilol 6.25 mg and Imdur 120 mg CR by her PCP due to low BPs. Also started on midodrine 2.5 mg BID. -Hold home carvedilol 6.25 mg and Imdur 120 mg CR  -Continue midodrine 2.5 mg BID  -Will continue to monitor at this time and readjust as BP's trend. ESRD on PD:  Follows with Dr Joyce Galicia (nephrology). -Consult nephrology, appreciate recommendations  -Avoid Nephrotoxins  -Renally dose medications  -Continue Calcitriol 0.5mg daily     Coronary artery disease with history of MI: 1 SAL to OM 2004, MI in 2012 s/p 2 SAL to OM2/OM3. Has chronic proximal occlusion with L to R collaterals. Follows with Adriel Galarza (cardiologist). Echo with EF 60-65%. - Remote tele  - Cont home lipitor 80mg QHS     Chronic HFpEF: Per chart review last echo in 2019 (EF 66-70%). Pt reports recent admission to outside hospital for diuresis. Echo EF 60-65%. -Monitor for signs/symptoms of HF     Interstitial lung disease: With chronic hypoxia on 2 L oxygen via nasal cannula at home. Biopsy negative for sarcoid per patient. Attributed to smoking history.    - Duonebs prn  - Humidified air  - Home 2L O2 Chronic sacral ulcer: painful.   -Consult inpatient wound care     T2DM with CKD: Last HgA1C  4.7 (3/25/22). Not on any medications currently. POA glucose 126.   - Monitor on daily labs     GERD Stable. -Continue home sucralfate   -Protonix 40 IV daily     Hx of DVT: Of right leg. No recurrence. Home warfarin 1 mg MWF with 2.5 mg Tue Thur Sat. INR 13.5 9/3.   -Hold warfarin, pharmacy to dose  -PT/INR daily     History of gout: Takes allopurinol 100 mg daily. Has not had a recent flare.   -Continue home allopurinol 100 mg daily     Intermittent constipation: Takes docusate sodium daily. -MiraLAX PRN     Hyperlipidemia: Total 100, TG 78, HDL 51, LDL 33.4.   -Continue home Lipitor 80 mg nightly    Obstructive sleep apnea: Uses CPAP nightly, well-controlled at this time. Former smoker: 0.5 PPD for 41 years for 20.5-pack-year history. Quit on 11/9/2014. Obesity: PT with BMI of Body mass index is 38.74 kg/m². - Encouraging lifestyle modifications and further follow up outpatient.     Patient discussed with Jimy Wild MD  [facility] Medicine Resident       For Billing    Chief Complaint   Patient presents with    Nausea    Vomiting    Fatigue       Hospital Problems  Date Reviewed: 9/2/2022            Codes Class Noted POA    * (Principal) Intractable vomiting with nausea ICD-10-CM: R11.2  ICD-9-CM: 536.2  9/2/2022 Yes        Hyponatremia ICD-10-CM: E87.1  ICD-9-CM: 276.1  9/2/2022 Yes        Supratherapeutic INR ICD-10-CM: R79.1  ICD-9-CM: 790.92  9/2/2022 Yes        Hypokalemia ICD-10-CM: E87.6  ICD-9-CM: 276.8  7/23/2022 Yes        Abdominal pain ICD-10-CM: R10.9  ICD-9-CM: 789.00  7/23/2022 Yes        ESRD on peritoneal dialysis [facility]) ICD-10-CM: N18.6, Z99.2  ICD-9-CM: 585.6, V45.11  6/23/2018 Yes        Hx of deep venous thrombosis ICD-10-CM: S48.886  ICD-9-CM: V12.51  5/30/2018 Yes        Diabetic gastroparesis ([facility] ICD-10-CM: E11.43, K31.84  ICD-9-CM: 250.60, 536.3 Unknown Yes        DM type 2 causing neurological disease ([facility] [address] ICD-10-CM: E11.49  ICD-9-CM: 250.60  Unknown Yes        Warfarin anticoagulation ICD-10-CM: Z79.01  ICD-9-CM: V58.61  8/20/2017 Yes        Coronary artery disease (Chronic) ICD-10-CM: I25.10  ICD-9-CM: 414.00  2/16/2011 Yes    Overview Addendum 10/5/2012  7:33 AM by Liat Celis, [address] 2004  1v CAD by cath - PCI OM with SAL  Cath 5/2004 - patent stent, no new disease  Lexiscan cardiolite 12/09- normal perfusion, EF 66%  Cath: 3/19/12: PA 55/30 mean 41, wedge 26, RA 24, EDP 35, LVG 55%, LM normal, LAD normal, LCX - OM1 patent stent, OM2 prox 85% long, % with L to R collaterals

## 2022-09-03 NOTE — PROGRESS NOTES
[facility] Pharmacy Dosing Services: 9/3/22       Consult for Warfarin Dosing by Pharmacy by Dr. Monk Abts provided for this 61 y.o.  female , for indication of History of VTE (confirmed no current VTE)  Day of Therapy: Resumed from PTA medication list  Home Regimen: Warfarin 1 mg on MWF, 2.5 mg on TTSS  Dose to achieve an INR goal of 2-3     Order entered to hold warfarin for an INR of >13.5     Significant drug interactions: allopurinol (home med)    Previous dose given PTA 9/1: 2.5 mg   PT/INR Lab Results   Component Value Date/Time    INR >13.5 (HH) 09/03/2022 03:01 AM      Platelets Lab Results   Component Value Date/Time    PLATELET 013 69/15/5545 03:01 AM      H/H Lab Results   Component Value Date/Time    HGB 7.3 (L) 09/03/2022 03:01 AM        Pharmacy to follow daily and will provide subsequent Warfarin dosing based on clinical status.   Ernesto Jones, [address] [place] [address])  Contact information [phone]

## 2022-09-03 NOTE — PROGRESS NOTES
Gracie Magaña  YOB: 1955      Assessment & Plan:     [facility] :under my care  - 40lbs fluid removal during last Chip admission over few weeks  - Appears not able to tolerate euvolemia. Hypokalemia  - due to PD loss  Hypotension  - Midodrine  N/V   - she thinks from day time dwell, it might be gastroparesis + poor KTV  HFpEF    Plan  - CCPD prescription 4 exchange: 2800 ml x4  and will hold mid-day exchange 1500 ml Icodextrin  -KCL  -ALDO            Subjective:   CC: F/U ESKD -CCPD admitted with N/V/Dizziness  HPI: Patient seen, 150 CC UF last night.   ROS:Tiredness +  Current Facility-Administered Medications   Medication Dose Route Frequency    Warfarin Pharmacy to dose   Other Rx Dosing/Monitoring    Warfarin no dose 9/3/22 due to INR of >13.5   Other ONCE    ondansetron (ZOFRAN) injection 8 mg  8 mg IntraVENous Q6H    pantoprazole (PROTONIX) 40 mg in 0.9% sodium chloride 10 mL injection  40 mg IntraVENous DAILY    gentamicin (GARAMYCIN) 0.1 % ointment   Topical DAILY    peritoneal dialysis DEXTROSE 1.5% (2.5 mEq/L low calcium) solution 6,000 mL  6,000 mL IntraPERitoneal DAILY    peritoneal dialysis DEXTROSE 1.5% (2.5 mEq/L low calcium) solution 6,000 mL  6,000 mL IntraPERitoneal DAILY    gentamicin (GARAMYCIN) 0.1 % cream   Topical DIALYSIS PRN    nitroglycerin (NITROSTAT) tablet 0.4 mg  0.4 mg SubLINGual Q5MIN PRN    sodium chloride (NS) flush 5-40 mL  5-40 mL IntraVENous Q8H    sodium chloride (NS) flush 5-40 mL  5-40 mL IntraVENous PRN    acetaminophen (TYLENOL) tablet 650 mg  650 mg Oral Q6H PRN    Or    acetaminophen (TYLENOL) suppository 650 mg  650 mg Rectal Q6H PRN    polyethylene glycol (MIRALAX) packet 17 g  17 g Oral DAILY PRN    0.9% sodium chloride infusion  25 mL/hr IntraVENous CONTINUOUS    albuterol-ipratropium (DUO-NEB) 2.5 MG-0.5 MG/3 ML  3 mL Nebulization Q6H PRN    allopurinoL (ZYLOPRIM) tablet 100 mg  100 mg Oral DAILY    atorvastatin (LIPITOR) tablet 80 mg  80 mg Oral QHS    calcitRIOL (ROCALTROL) capsule 0.5 mcg  0.5 mcg Oral DAILY    folic acid (FOLVITE) tablet 1 mg  1 mg Oral DAILY    midodrine (PROAMATINE) tablet 2.5 mg  2.5 mg Oral BID    potassium chloride SR (KLOR-CON 10) tablet 20 mEq  20 mEq Oral DAILY    sucralfate (CARAFATE) tablet 1 g  1 g Oral AC&HS          Objective:     Vitals:  Blood pressure (!) 122/53, pulse 89, temperature 98.4 °F (36.9 °C), resp. rate 18, height 5' 10\" (1.778 m), weight 122.5 kg (270 lb 1 oz), SpO2 100 %. Temp (24hrs), Av.1 °F (36.7 °C), Min:97.4 °F (36.3 °C), Max:98.7 °F (37.1 °C)      Intake and Output:  No intake/output data recorded.  1901 -  0700  In: 1659.6 [I.V.:1659.6]  Out: 1512     Physical Exam:                   Physical Examination:   GENERAL ASSESSMENT: NAD  HEENT:Nontraumatic   CHEST: CTA  HEART: S1S2  ABDOMEN: Soft,NT,  :Myers:   EXTREMITY: EDEMA  NEURO:Grossly non focal          ECG/rhythm:    Data Review      No results for input(s): TNIPOC in the last 72 hours. No lab exists for component: ITNL   No results for input(s): CPK, CKMB, TROIQ in the last 72 hours. Recent Labs     22  1727   * 133* 131*   K 3.1* 2.9* 2.8*   CL 99 95* 92*   CO2 25 25 27   BUN 53* 56* 56*   CREA 9.87* 10.50* 10.50*   * 123* 126*   CA 8.3* 8.8 9.1   ALB  --   --  2.6*   WBC 11.8* 12.6* 13.3*   HGB 7.3* 8.6* 9.3*   HCT 23.0* 26.6* 28.7*    234 242        Recent Labs     22   INR >13.5* 8.5*   PTP >120.0* 77.9*       Needs: urine analysis, urine sodium, protein and creatinine  Lab Results   Component Value Date/Time    Sodium,urine random 25 11/10/2014 12:40 PM    Creatinine, urine 145.29 2017 05:01 AM               Discussed with:   Patient     : Salome Bearden MD  9/3/2022        [facility]:  [email or web address]  Cindy Fermin office:  [address] [place] [address], [address] [address]  Phone: [phone]  Fax :     [phone]    Nani office:  [address] [place] [address]  Nani, [address] [place] [address]  Phone - [phone]  Fax - [phone]

## 2022-09-03 NOTE — PROGRESS NOTES
Relayed critical value of labs to on call [facility] medicine via [employer]. PT >120 and INR >13.5, hgb of 7.3, K at 3.1. MD acknowledged. No new orders. 8143  Pt is having persistent N&V despite giving due zofran IV. Iced chips given as well as lavender menthol to relieve nausea. MD informed as  is also requested for COVID test for pt.

## 2022-09-03 NOTE — DIALYSIS
Peritoneal Dialysis Initiation / [phone]         Orders   Therapy Time: 9 hrs   Cycles: 4   Fill Volume: 2800 mL   Last Fill Volume: 0 mL   Total Volume: 11,200 mL   Solution: (2) 1.5 % Dextrose Dianeal bags          Access   Type & Location: [facility]   Comments: Patient reports that she changed her dressing/cleansed per site and applied gentamicin \"earlier\" today. Dsg change date: 09/03/22          Labs   HBsAg (Antigen) / date: Neg (08/10/22)                          HBsAb (Antibody) / date:     Source: Physicians portal          Safety:   Time Out Done:   (Time) 8344   Consent obtained/signed: Verified   Education: Infection control   Primary Nurse Rpt : Randy De La Fuente RN      Comments:   Pt orders, notes, labs, code status reviewed. Prior to connection, one-minute Alcavis scrub performed. Start time: 66 91 21 (09/03/22)  Estimated End Time: 0197 (09/04/22)     Remained present during initial drain followed by initiation of first fill. Prior to departure, bed in lowest position, call bell and personal belongings within reach.

## 2022-09-04 LAB
ANION GAP SERPL CALC-SCNC: 9 MMOL/L (ref 5–15)
ATRIAL RATE: 87 BPM
BASOPHILS # BLD: 0 K/UL (ref 0–0.1)
BASOPHILS NFR BLD: 0 % (ref 0–1)
BUN SERPL-MCNC: 49 MG/DL (ref 6–20)
BUN/CREAT SERPL: 5 (ref 12–20)
CALCIUM SERPL-MCNC: 8.5 MG/DL (ref 8.5–10.1)
CALCULATED P AXIS, ECG09: 40 DEGREES
CALCULATED R AXIS, ECG10: 14 DEGREES
CALCULATED T AXIS, ECG11: 58 DEGREES
CHLORIDE SERPL-SCNC: 98 MMOL/L (ref 97–108)
CO2 SERPL-SCNC: 26 MMOL/L (ref 21–32)
COVID-19 RAPID TEST, COVR: NOT DETECTED
CREAT SERPL-MCNC: 10.2 MG/DL (ref 0.55–1.02)
DIAGNOSIS, 93000: NORMAL
DIFFERENTIAL METHOD BLD: ABNORMAL
EOSINOPHIL # BLD: 0.6 K/UL (ref 0–0.4)
EOSINOPHIL NFR BLD: 6 % (ref 0–7)
ERYTHROCYTE [DISTWIDTH] IN BLOOD BY AUTOMATED COUNT: 14.6 % (ref 11.5–14.5)
GLUCOSE SERPL-MCNC: 98 MG/DL (ref 65–100)
HCT VFR BLD AUTO: 22.1 % (ref 35–47)
HGB BLD-MCNC: 7 G/DL (ref 11.5–16)
IMM GRANULOCYTES # BLD AUTO: 0 K/UL (ref 0–0.04)
IMM GRANULOCYTES NFR BLD AUTO: 0 % (ref 0–0.5)
INR PPP: 1.3 (ref 0.9–1.1)
LYMPHOCYTES # BLD: 1.9 K/UL (ref 0.8–3.5)
LYMPHOCYTES NFR BLD: 19 % (ref 12–49)
MCH RBC QN AUTO: 30.7 PG (ref 26–34)
MCHC RBC AUTO-ENTMCNC: 31.7 G/DL (ref 30–36.5)
MCV RBC AUTO: 96.9 FL (ref 80–99)
MONOCYTES # BLD: 0.6 K/UL (ref 0–1)
MONOCYTES NFR BLD: 6 % (ref 5–13)
NEUTS SEG # BLD: 7.2 K/UL (ref 1.8–8)
NEUTS SEG NFR BLD: 69 % (ref 32–75)
NRBC # BLD: 0 K/UL (ref 0–0.01)
NRBC BLD-RTO: 0 PER 100 WBC
P-R INTERVAL, ECG05: 208 MS
PLATELET # BLD AUTO: 183 K/UL (ref 150–400)
PMV BLD AUTO: 11.3 FL (ref 8.9–12.9)
POTASSIUM SERPL-SCNC: 3.5 MMOL/L (ref 3.5–5.1)
PROTHROMBIN TIME: 13.7 SEC (ref 9–11.1)
Q-T INTERVAL, ECG07: 408 MS
QRS DURATION, ECG06: 76 MS
QTC CALCULATION (BEZET), ECG08: 490 MS
RBC # BLD AUTO: 2.28 M/UL (ref 3.8–5.2)
SODIUM SERPL-SCNC: 133 MMOL/L (ref 136–145)
SOURCE, COVRS: NORMAL
VENTRICULAR RATE, ECG03: 87 BPM
WBC # BLD AUTO: 10.4 K/UL (ref 3.6–11)

## 2022-09-04 PROCEDURE — 87635 SARS-COV-2 COVID-19 AMP PRB: CPT

## 2022-09-04 PROCEDURE — 85025 COMPLETE CBC W/AUTO DIFF WBC: CPT

## 2022-09-04 PROCEDURE — 90945 DIALYSIS ONE EVALUATION: CPT

## 2022-09-04 PROCEDURE — 74011250637 HC RX REV CODE- 250/637

## 2022-09-04 PROCEDURE — 74011250636 HC RX REV CODE- 250/636: Performed by: STUDENT IN AN ORGANIZED HEALTH CARE EDUCATION/TRAINING PROGRAM

## 2022-09-04 PROCEDURE — 77030037877 HC DRSG MEPILEX >48IN BORD MOLN -A

## 2022-09-04 PROCEDURE — 2709999900 HC NON-CHARGEABLE SUPPLY

## 2022-09-04 PROCEDURE — 99232 SBSQ HOSP IP/OBS MODERATE 35: CPT | Performed by: FAMILY MEDICINE

## 2022-09-04 PROCEDURE — 77030040393 HC DRSG OPTIFOAM GENT MDII -B

## 2022-09-04 PROCEDURE — 74011250637 HC RX REV CODE- 250/637: Performed by: STUDENT IN AN ORGANIZED HEALTH CARE EDUCATION/TRAINING PROGRAM

## 2022-09-04 PROCEDURE — 74011000250 HC RX REV CODE- 250: Performed by: STUDENT IN AN ORGANIZED HEALTH CARE EDUCATION/TRAINING PROGRAM

## 2022-09-04 PROCEDURE — 74011000250 HC RX REV CODE- 250

## 2022-09-04 PROCEDURE — 94761 N-INVAS EAR/PLS OXIMETRY MLT: CPT

## 2022-09-04 PROCEDURE — 80048 BASIC METABOLIC PNL TOTAL CA: CPT

## 2022-09-04 PROCEDURE — 65270000046 HC RM TELEMETRY

## 2022-09-04 PROCEDURE — C9113 INJ PANTOPRAZOLE SODIUM, VIA: HCPCS | Performed by: STUDENT IN AN ORGANIZED HEALTH CARE EDUCATION/TRAINING PROGRAM

## 2022-09-04 PROCEDURE — 77010033678 HC OXYGEN DAILY

## 2022-09-04 PROCEDURE — 36415 COLL VENOUS BLD VENIPUNCTURE: CPT

## 2022-09-04 PROCEDURE — 85610 PROTHROMBIN TIME: CPT

## 2022-09-04 PROCEDURE — 77030037878 HC DRSG MEPILEX >48IN BORD MOLN -B

## 2022-09-04 RX ORDER — BISACODYL 5 MG
5 TABLET, DELAYED RELEASE (ENTERIC COATED) ORAL DAILY
Status: DISCONTINUED | OUTPATIENT
Start: 2022-09-04 | End: 2022-09-05

## 2022-09-04 RX ORDER — WARFARIN 2.5 MG/1
2.5 TABLET ORAL ONCE
Status: COMPLETED | OUTPATIENT
Start: 2022-09-04 | End: 2022-09-04

## 2022-09-04 RX ORDER — AMOXICILLIN 250 MG
1 CAPSULE ORAL DAILY
Status: DISCONTINUED | OUTPATIENT
Start: 2022-09-04 | End: 2022-09-05

## 2022-09-04 RX ADMIN — Medication 5 ML: at 05:58

## 2022-09-04 RX ADMIN — ALLOPURINOL 100 MG: 100 TABLET ORAL at 11:31

## 2022-09-04 RX ADMIN — SODIUM CHLORIDE, PRESERVATIVE FREE 40 MG: 5 INJECTION INTRAVENOUS at 21:11

## 2022-09-04 RX ADMIN — LIDOCAINE HYDROCHLORIDE 40 ML: 20 SOLUTION TOPICAL at 15:14

## 2022-09-04 RX ADMIN — SODIUM CHLORIDE, PRESERVATIVE FREE 10 ML: 5 INJECTION INTRAVENOUS at 11:26

## 2022-09-04 RX ADMIN — ONDANSETRON 8 MG: 2 INJECTION INTRAMUSCULAR; INTRAVENOUS at 19:07

## 2022-09-04 RX ADMIN — BISACODYL 5 MG: 5 TABLET, COATED ORAL at 15:12

## 2022-09-04 RX ADMIN — ONDANSETRON 8 MG: 2 INJECTION INTRAMUSCULAR; INTRAVENOUS at 05:58

## 2022-09-04 RX ADMIN — SUCRALFATE 1 G: 1 TABLET ORAL at 21:11

## 2022-09-04 RX ADMIN — SUCRALFATE 1 G: 1 TABLET ORAL at 19:07

## 2022-09-04 RX ADMIN — CALCITRIOL CAPSULES 0.25 MCG 0.5 MCG: 0.25 CAPSULE ORAL at 11:30

## 2022-09-04 RX ADMIN — SUCRALFATE 1 G: 1 TABLET ORAL at 11:30

## 2022-09-04 RX ADMIN — SODIUM CHLORIDE, PRESERVATIVE FREE 40 MG: 5 INJECTION INTRAVENOUS at 11:25

## 2022-09-04 RX ADMIN — ONDANSETRON 8 MG: 2 INJECTION INTRAMUSCULAR; INTRAVENOUS at 11:25

## 2022-09-04 RX ADMIN — Medication 5 ML: at 21:11

## 2022-09-04 RX ADMIN — SUCRALFATE 1 G: 1 TABLET ORAL at 06:31

## 2022-09-04 RX ADMIN — FOLIC ACID 1 MG: 1 TABLET ORAL at 11:30

## 2022-09-04 RX ADMIN — ATORVASTATIN CALCIUM 80 MG: 20 TABLET, FILM COATED ORAL at 21:11

## 2022-09-04 RX ADMIN — DOCUSATE SODIUM 50MG AND SENNOSIDES 8.6MG 1 TABLET: 8.6; 5 TABLET, FILM COATED ORAL at 15:12

## 2022-09-04 RX ADMIN — POTASSIUM CHLORIDE 20 MEQ: 750 TABLET, FILM COATED, EXTENDED RELEASE ORAL at 11:30

## 2022-09-04 RX ADMIN — WARFARIN SODIUM 2.5 MG: 2.5 TABLET ORAL at 19:07

## 2022-09-04 NOTE — PROGRESS NOTES
Bedside shift [facility] by Haylie Boone (offgoing nurse). Report included the following information SBAR, Kardex, Intake/Output, MAR, Recent Results, and Med Rec Status.

## 2022-09-04 NOTE — PROGRESS NOTES
Problem: Falls - Risk of  Goal: *Absence of Falls  Description: Document Fabricio Corrie Fall Risk and appropriate interventions in the flowsheet. Outcome: Progressing Towards Goal  Note: Fall Risk Interventions:  Mobility Interventions: Patient to call before getting OOB         Medication Interventions: Bed/chair exit alarm, Patient to call before getting OOB, Teach patient to arise slowly    Elimination Interventions: Call light in reach, Patient to call for help with toileting needs    History of Falls Interventions: Bed/chair exit alarm         Problem: Pressure Injury - Risk of  Goal: *Prevention of pressure injury  Description: Document Randy Scale and appropriate interventions in the flowsheet.   Outcome: Progressing Towards Goal  Note: Pressure Injury Interventions:  Sensory Interventions: Assess changes in LOC, Check visual cues for pain    Moisture Interventions: Assess need for specialty bed    Activity Interventions: Assess need for specialty bed    Mobility Interventions: Assess need for specialty bed, HOB 30 degrees or less    Nutrition Interventions: Document food/fluid/supplement intake, Offer support with meals,snacks and hydration    Friction and Shear Interventions: HOB 30 degrees or less, Lift sheet, Minimize layers

## 2022-09-04 NOTE — DIALYSIS
Peritoneal Dialysis Initiation / [phone]           Orders   Therapy Time: 9 hrs   Cycles: 4   Fill Volume: 2800 mL   Last Fill Volume: 0 mL   Total Volume: 11,200 mL   Solution: (2) 1.5 % Dextrose Dianeal bags            Access   Type & Location: [facility]   Comments: Patient reports that she changed her dressing/cleansed per site and applied gentamicin \"earlier\" today. Dsg change date: 09/04/22            Labs   HBsAg (Antigen) / date: Neg (08/10/22)                          HBsAb (Antibody) / date:     Source: Physicians portal            Safety:   Time Out Done:   (Time) 5013   Consent obtained/signed: Verified   Education: Infection control   Primary Nurse Rpt : Alicia Lazo RN      Comments:   Pt orders, notes, labs, code status reviewed. Prior to connection, one-minute Alcavis scrub performed. Start time: 1728 0934 (09/04/22)  Estimated End Time: 0210 (09/05/22)     Remained present during initial drain followed by initiation of first fill. Prior to departure, bed in lowest position, call bell and personal belongings within reach.

## 2022-09-04 NOTE — PROGRESS NOTES
Problem: Falls - Risk of  Goal: *Absence of Falls  Description: Document Bronwyn Blood Fall Risk and appropriate interventions in the flowsheet. Outcome: Progressing Towards Goal  Note: Fall Risk Interventions:  Mobility Interventions: Patient to call before getting OOB         Medication Interventions: Patient to call before getting OOB, Teach patient to arise slowly    Elimination Interventions: Call light in reach    History of Falls Interventions: Bed/chair exit alarm         Problem: Pressure Injury - Risk of  Goal: *Prevention of pressure injury  Description: Document Randy Scale and appropriate interventions in the flowsheet.   Outcome: Progressing Towards Goal  Note: Pressure Injury Interventions:  Sensory Interventions: Assess changes in LOC, Check visual cues for pain    Moisture Interventions: Check for incontinence Q2 hours and as needed, Absorbent underpads    Activity Interventions: Increase time out of bed, PT/OT evaluation    Mobility Interventions: Pressure redistribution bed/mattress (bed type), PT/OT evaluation    Nutrition Interventions: Document food/fluid/supplement intake, Discuss nutritional consult with provider, Offer support with meals,snacks and hydration    Friction and Shear Interventions: Apply protective barrier, creams and emollients, Foam dressings/transparent film/skin sealants, HOB 30 degrees or less, Minimize layers

## 2022-09-04 NOTE — RT PROTOCOL NOTE
Checked on Pt at approx 21:30 and 00:15, PT states she has been vomiting. PT not placed on bipap at this time.   Will continue to monitor

## 2022-09-04 NOTE — PROGRESS NOTES
[facility] Pharmacy Dosing Services: 9/4/22    Consult for Warfarin Dosing by Pharmacy by Dr. Carlos Villarreal provided for this 61 y.o.  female , for indication of History of VTE (confirmed no current VTE)  Day of Therapy: Resumed from PTA medication list  Home Regimen: Warfarin 1 mg on MWF, 2.5 mg on TTSS  Dose to achieve an INR goal of 2-3    Order entered for  Warfarin  2.5 (mg) ordered to be given today at 18:00. Significant drug interactions: allopurinol (home)  Previous dose given PTA 9/1: 2.5 mg   PT/INR Lab Results   Component Value Date/Time    INR 1.3 (H) 09/04/2022 04:20 AM      Platelets Lab Results   Component Value Date/Time    PLATELET 678 82/23/8584 04:20 AM      H/H Lab Results   Component Value Date/Time    HGB 7.0 (L) 09/04/2022 04:20 AM        Pharmacy to follow daily and will provide subsequent Warfarin dosing based on clinical status.   JOHNSON Stapleton)  Contact information [phone]

## 2022-09-04 NOTE — PROGRESS NOTES
[address] Gomez [address] [facility]   Office [phone]  Fax [phone]          Subjective / Objective     Subjective  Overnight Events:  Pt requested to d/c fluids to avoid getting fluid overloaded. She also requested a covid test which was neg. Patient seen and examined at bedside, looks uncomfortable. Reports continued nausea with vomiting/spit up, epigastric pain. Continued chronic CP. She has been able to keep some food and fluids down since yesterday. Denies SOB, cough, URI sx. Does not want any antiemetics other than zofran, as they reportedly make her \"loopy. \"    Respiratory: O2 Device: Nasal cannula 2L, sating 100%  Visit Vitals  BP (!) 108/53 (BP 1 Location: Right lower arm, BP Patient Position: At rest;Sitting)   Pulse 98   Temp 98.9 °F (37.2 °C)   Resp 16   Ht 5' 10\" (1.778 m)   Wt 270 lb 1 oz (122.5 kg)   SpO2 100%   BMI 38.75 kg/m²     Physical Examination:   General appearance - alert, in no acute distress, appears uncomfortable, hunched over. Chest - clear to auscultation, no wheezes, rales or rhonchi, symmetric air entry  Heart - normal rate, regular rhythm, normal S1, S2, no murmurs, rubs, clicks or gallops  Abdomen - soft,, nondistended, no masses or organomegaly. Epigastric TTP. Neurological - alert, oriented, normal speech, no focal findings  Skin - warm, dry. No notable rashes  Extremities - No clubbing or cyanosis. Moving all extremities spontaneously.   Psychiatric - normal speech and thought processes    I/O:  09/03 0701 - 09/04 0700  In: 150 [I.V.:150]  Out: -61   Inpatient Medications  Current Facility-Administered Medications   Medication    Warfarin Pharmacy to dose    ondansetron (ZOFRAN) injection 8 mg    epoetin charlie-epbx (RETACRIT) injection 10,000 Units    pantoprazole (PROTONIX) 40 mg in 0.9% sodium chloride 10 mL injection    gentamicin (GARAMYCIN) 0.1 % ointment    peritoneal dialysis DEXTROSE 1.5% (2.5 mEq/L low calcium) solution 6,000 mL peritoneal dialysis DEXTROSE 1.5% (2.5 mEq/L low calcium) solution 6,000 mL    gentamicin (GARAMYCIN) 0.1 % cream    nitroglycerin (NITROSTAT) tablet 0.4 mg    sodium chloride (NS) flush 5-40 mL    sodium chloride (NS) flush 5-40 mL    acetaminophen (TYLENOL) tablet 650 mg    Or    acetaminophen (TYLENOL) suppository 650 mg    polyethylene glycol (MIRALAX) packet 17 g    albuterol-ipratropium (DUO-NEB) 2.5 MG-0.5 MG/3 ML    allopurinoL (ZYLOPRIM) tablet 100 mg    atorvastatin (LIPITOR) tablet 80 mg    calcitRIOL (ROCALTROL) capsule 0.5 mcg    folic acid (FOLVITE) tablet 1 mg    midodrine (PROAMATINE) tablet 2.5 mg    potassium chloride SR (KLOR-CON 10) tablet 20 mEq    sucralfate (CARAFATE) tablet 1 g     Current Facility-Administered Medications   Medication Dose Route Frequency    Warfarin Pharmacy to dose   Other Rx Dosing/Monitoring    ondansetron (ZOFRAN) injection 8 mg  8 mg IntraVENous Q6H    epoetin charlie-epbx (RETACRIT) injection 10,000 Units  10,000 Units SubCUTAneous Q7D    pantoprazole (PROTONIX) 40 mg in 0.9% sodium chloride 10 mL injection  40 mg IntraVENous DAILY    gentamicin (GARAMYCIN) 0.1 % ointment   Topical DAILY    peritoneal dialysis DEXTROSE 1.5% (2.5 mEq/L low calcium) solution 6,000 mL  6,000 mL IntraPERitoneal DAILY    peritoneal dialysis DEXTROSE 1.5% (2.5 mEq/L low calcium) solution 6,000 mL  6,000 mL IntraPERitoneal DAILY    gentamicin (GARAMYCIN) 0.1 % cream   Topical DIALYSIS PRN    nitroglycerin (NITROSTAT) tablet 0.4 mg  0.4 mg SubLINGual Q5MIN PRN    sodium chloride (NS) flush 5-40 mL  5-40 mL IntraVENous Q8H    sodium chloride (NS) flush 5-40 mL  5-40 mL IntraVENous PRN    acetaminophen (TYLENOL) tablet 650 mg  650 mg Oral Q6H PRN    Or    acetaminophen (TYLENOL) suppository 650 mg  650 mg Rectal Q6H PRN    polyethylene glycol (MIRALAX) packet 17 g  17 g Oral DAILY PRN    albuterol-ipratropium (DUO-NEB) 2.5 MG-0.5 MG/3 ML  3 mL Nebulization Q6H PRN    allopurinoL (ZYLOPRIM) tablet 100 mg  100 mg Oral DAILY    atorvastatin (LIPITOR) tablet 80 mg  80 mg Oral QHS    calcitRIOL (ROCALTROL) capsule 0.5 mcg  0.5 mcg Oral DAILY    folic acid (FOLVITE) tablet 1 mg  1 mg Oral DAILY    midodrine (PROAMATINE) tablet 2.5 mg  2.5 mg Oral BID    potassium chloride SR (KLOR-CON 10) tablet 20 mEq  20 mEq Oral DAILY    sucralfate (CARAFATE) tablet 1 g  1 g Oral AC&HS     Allergies  Allergies   Allergen Reactions    Contrast Dye [Iodine] Anaphylaxis     Tolerates when pre medicated w/ IV solumedrol and benadryl prior to procedure     Shellfish Derived Anaphylaxis    Levaquin [Levofloxacin] Nausea and Vomiting    Morphine Hives and Itching    Nsaids (Non-Steroidal Anti-Inflammatory Drug) Nausea and Vomiting     CBC:  Recent Labs     09/04/22 0420 09/03/22 1700 09/03/22 0301 09/02/22 0347   WBC 10.4  --  11.8* 12.6*   HGB 7.0* 7.1* 7.3* 8.6*     --  191 438       Metabolic Panel:  Recent Labs     09/04/22 0420 09/03/22 1700 09/03/22  0301 09/02/22 0347 09/01/22  1727 09/01/22  1727   *  --  133* 133*  --  131*   K 3.5  --  3.1* 2.9*  --  2.8*   CL 98  --  99 95*  --  92*   CO2 26  --  25 25  --  27   BUN 49*  --  53* 56*  --  56*   CREA 10.20*  --  9.87* 10.50*  --  10.50*   GLU 98  --  101* 123*  --  126*   CA 8.5  --  8.3* 8.8  --  9.1   ALB  --   --   --   --   --  2.6*   ALT  --   --   --   --   --  14   INR 1.3* 2.3* >13.5* 8.5*   < >  --     < > = values in this interval not displayed. Imaging/procedures:   CT ABD PELV WO CONT    Result Date: 9/1/2022  1. Increasing moderate volume ascites. Right abdominal peritoneal catheter. 2.  Grossly unchanged right mid renal indeterminate lesion in several additional bilateral likely cystic lesions as described above. 3.  Bilateral nonobstructive nephrolithiasis. 4.  Cholecystectomy. 5.  Evidence of chronic interstitial lung disease.           Assessment and Plan     Melissa Wynne is a 61 y.o. female with PMH ESRD on PD, CAD with MI 2012, HFpEF, ILD on 2L home O2, JASEN, GERD, h/o DVT in RLE on warfarin, HTN, HLD, admitted with intractable n/v and epigastric pain. Intractable nausea and vomiting with epigastric abd pain: Lipase 662. CT AP showed increasing moderate volume ascites, unchanged b/l nonobstructive nephrolithiasis. Possibly 2/2 gastroparesis vs CHF vs uremia. Peritoneal fluid cx no growth. GI no need for endoscopy. -Zofran 8mg q6h  -Protonix  -GI consulted, appreciate recommendations  -Nephrology consulted, appreciate recommendations     Chronic hypokalemia: Baseline 3.3. POA 2.8-->3.1. On potassium chloride 20 meq daily at home. Likely 2/2 continued nausea and vomiting. EKG wnl.  -Continue home potassium chloride 20 meq daily   -Monitor BMP    Supratherapeutic INR on warfarin: (Resolved) INR 13.5 on 9/3.  -Home warfarin, pharmacy to dose  -PT/INR daily     Hypotension in the setting of HTN: recently taken off of home carvedilol 6.25 mg and Imdur 120 mg CR by her PCP due to low BPs. Also started on midodrine 2.5 mg BID. -Hold home carvedilol 6.25 mg and Imdur 120 mg CR  -Continue midodrine 2.5 mg BID  -Will continue to monitor at this time and readjust as BP's trend. ESRD on PD:  Follows with Dr Silver Galvez (nephrology). -Consult nephrology, appreciate recommendations  -Avoid Nephrotoxins  -Renally dose medications  -Continue Calcitriol 0.5mg daily     Coronary artery disease with history of MI: 1 SAL to OM 2004, MI in 2012 s/p 2 SAL to OM2/OM3. Has chronic proximal occlusion with L to R collaterals. Follows with Aimee Buck (cardiologist). Echo with EF 60-65%. - Remote tele  - Cont home lipitor 80mg QHS     Chronic HFpEF: Per chart review last echo in 2019 (EF 66-70%). Pt reports recent admission to outside hospital for diuresis. Echo EF 60-65%. -Monitor for signs/symptoms of HF     Interstitial lung disease: With chronic hypoxia on 2 L oxygen via nasal cannula at home. Biopsy negative for sarcoid per patient.   Attributed to smoking history. - Duonebs prn  - Humidified air  - Home 2L O2     Chronic sacral ulcer: painful.   -Consult inpatient wound care     T2DM with CKD: Last HgA1C  4.7 (3/25/22). Not on any medications currently. POA glucose 126.   - Monitor on daily labs     GERD Stable. -Continue home sucralfate   -Protonix 40 IV daily     Hx of DVT: Of right leg. No recurrence. Home warfarin 1 mg MWF with 2.5 mg Tue Thur Sat.   -Home  warfarin, pharmacy to dose  -PT/INR daily     History of gout: Takes allopurinol 100 mg daily. Has not had a recent flare.   -Continue home allopurinol 100 mg daily     Intermittent constipation: Takes docusate sodium daily. -MiraLAX PRN     Hyperlipidemia: Total 100, TG 78, HDL 51, LDL 33.4.   -Continue home Lipitor 80 mg nightly    Obstructive sleep apnea: Uses CPAP nightly, well-controlled at this time. Former smoker: 0.5 PPD for 41 years for 20.5-pack-year history. Quit on 11/9/2014. Obesity: PT with BMI of Body mass index is 38.74 kg/m². - Encouraging lifestyle modifications and further follow up outpatient.     Patient discussed with Angelina Rodriguez MD  [facility] Medicine Resident       For Billing    Chief Complaint   Patient presents with    Nausea    Vomiting    Fatigue       Hospital Problems  Date Reviewed: 9/2/2022            Codes Class Noted POA    * (Principal) Intractable vomiting with nausea ICD-10-CM: R11.2  ICD-9-CM: 536.2  9/2/2022 Yes        Hyponatremia ICD-10-CM: E87.1  ICD-9-CM: 276.1  9/2/2022 Yes        Supratherapeutic INR ICD-10-CM: R79.1  ICD-9-CM: 790.92  9/2/2022 Yes        Hypokalemia ICD-10-CM: E87.6  ICD-9-CM: 276.8  7/23/2022 Yes        Abdominal pain ICD-10-CM: R10.9  ICD-9-CM: 789.00  7/23/2022 Yes        (HFpEF) heart failure with preserved ejection fraction ([facility] [address] ICD-10-CM: I50.30  ICD-9-CM: 428.9  9/23/2018 Yes        ESRD on peritoneal dialysis [facility]) ICD-10-CM: N18.6, Z99.2  ICD-9-CM: 585.6, V45.11  6/23/2018 Yes Hx of deep venous thrombosis ICD-10-CM: Z86.718  ICD-9-CM: V12.51  5/30/2018 Yes        Diabetic gastroparesis (HCC) ICD-10-CM: E11.43, K31.84  ICD-9-CM: 250.60, 536.3  Unknown Yes        GERD (gastroesophageal reflux disease) ICD-10-CM: K21.9  ICD-9-CM: 530.81  Unknown Yes        DM type 2 causing neurological disease ([facility] ICD-10-CM: E11.49  ICD-9-CM: 250.60  Unknown Yes        Gout ICD-10-CM: M10.9  ICD-9-CM: 274.9  Unknown Yes        ILD (interstitial lung disease) ([facility] ICD-10-CM: J84.9  ICD-9-CM: 577  8/20/2017 Yes        Warfarin anticoagulation ICD-10-CM: Z79.01  ICD-9-CM: V58.61  8/20/2017 Yes        HTN (hypertension) (Chronic) ICD-10-CM: I10  ICD-9-CM: 401.9  10/1/2012 Yes        Coronary artery disease (Chronic) ICD-10-CM: I25.10  ICD-9-CM: 414.00  2/16/2011 Yes    Overview Addendum 10/5/2012  7:33 AM by Art Arrieta, [address] 2004  1v CAD by cath - PCI OM with SAL  Cath 5/2004 - patent stent, no new disease  Lexiscan cardiolite 12/09- normal perfusion, EF 66%  Cath: 3/19/12: PA 55/30 mean 41, wedge 26, RA 24, EDP 35, LVG 55%, LM normal, LAD normal, LCX - OM1 patent stent, OM2 prox 85% long, % with L to R collaterals                JASEN on CPAP (Chronic) ICD-10-CM: G47.33, Z99.89  ICD-9-CM: 327.23, V46.8  2/16/2011 Yes    Overview Signed 3/21/2012  8:04 AM by Bette Brown CPAP

## 2022-09-04 NOTE — DIALYSIS
[address] [facility]       [phone]            Pt orders, notes, labs, code status and consent reviewed. Time out complete. Last Fill: 0   I-Drain: 1 ml   Total UF: -62 ml   Net Output: -61 ml   Added Dwell Time: 0:07   Lost Dwell Time: 0 mins   Average Dwell Time: 1 hr 41 mins   Effluent: Yellow/clear         At patient bedside for disconnection from PD cycler. Catheter scrubbed/soaked with Alcavis for two minutes prior to disconnection followed by sterile mini cap application. Line secured to abdomen. Old cassette/bags discarded in red biohazard.  Education pre/post. Bed in lowest position, call bell and personal belongings within reach  SBAR to Primary, RN

## 2022-09-05 ENCOUNTER — APPOINTMENT (OUTPATIENT)
Dept: GENERAL RADIOLOGY | Age: 67
DRG: 073 | End: 2022-09-05
Payer: MEDICARE

## 2022-09-05 LAB
ANION GAP SERPL CALC-SCNC: 9 MMOL/L (ref 5–15)
BASOPHILS # BLD: 0.1 K/UL (ref 0–0.1)
BASOPHILS NFR BLD: 1 % (ref 0–1)
BUN SERPL-MCNC: 50 MG/DL (ref 6–20)
BUN/CREAT SERPL: 5 (ref 12–20)
CALCIUM SERPL-MCNC: 8.8 MG/DL (ref 8.5–10.1)
CHLORIDE SERPL-SCNC: 98 MMOL/L (ref 97–108)
CO2 SERPL-SCNC: 27 MMOL/L (ref 21–32)
CREAT SERPL-MCNC: 10.3 MG/DL (ref 0.55–1.02)
DIFFERENTIAL METHOD BLD: ABNORMAL
EOSINOPHIL # BLD: 0.5 K/UL (ref 0–0.4)
EOSINOPHIL NFR BLD: 5 % (ref 0–7)
ERYTHROCYTE [DISTWIDTH] IN BLOOD BY AUTOMATED COUNT: 14.3 % (ref 11.5–14.5)
GLUCOSE BLD STRIP.AUTO-MCNC: 98 MG/DL (ref 65–117)
GLUCOSE SERPL-MCNC: 93 MG/DL (ref 65–100)
HCT VFR BLD AUTO: 22.1 % (ref 35–47)
HGB BLD-MCNC: 7 G/DL (ref 11.5–16)
IMM GRANULOCYTES # BLD AUTO: 0.1 K/UL (ref 0–0.04)
IMM GRANULOCYTES NFR BLD AUTO: 1 % (ref 0–0.5)
INR PPP: 1.3 (ref 0.9–1.1)
LYMPHOCYTES # BLD: 2.2 K/UL (ref 0.8–3.5)
LYMPHOCYTES NFR BLD: 21 % (ref 12–49)
MCH RBC QN AUTO: 31.4 PG (ref 26–34)
MCHC RBC AUTO-ENTMCNC: 31.7 G/DL (ref 30–36.5)
MCV RBC AUTO: 99.1 FL (ref 80–99)
MONOCYTES # BLD: 0.6 K/UL (ref 0–1)
MONOCYTES NFR BLD: 6 % (ref 5–13)
NEUTS SEG # BLD: 6.9 K/UL (ref 1.8–8)
NEUTS SEG NFR BLD: 66 % (ref 32–75)
NRBC # BLD: 0 K/UL (ref 0–0.01)
NRBC BLD-RTO: 0 PER 100 WBC
PLATELET # BLD AUTO: 180 K/UL (ref 150–400)
PMV BLD AUTO: 11.4 FL (ref 8.9–12.9)
POTASSIUM SERPL-SCNC: 3.5 MMOL/L (ref 3.5–5.1)
PROTHROMBIN TIME: 12.9 SEC (ref 9–11.1)
RBC # BLD AUTO: 2.23 M/UL (ref 3.8–5.2)
SERVICE CMNT-IMP: NORMAL
SODIUM SERPL-SCNC: 134 MMOL/L (ref 136–145)
TROPONIN-HIGH SENSITIVITY: 33 NG/L (ref 0–51)
WBC # BLD AUTO: 10.3 K/UL (ref 3.6–11)

## 2022-09-05 PROCEDURE — 85610 PROTHROMBIN TIME: CPT

## 2022-09-05 PROCEDURE — 74011250637 HC RX REV CODE- 250/637

## 2022-09-05 PROCEDURE — 71045 X-RAY EXAM CHEST 1 VIEW: CPT

## 2022-09-05 PROCEDURE — 74011000250 HC RX REV CODE- 250: Performed by: STUDENT IN AN ORGANIZED HEALTH CARE EDUCATION/TRAINING PROGRAM

## 2022-09-05 PROCEDURE — C9113 INJ PANTOPRAZOLE SODIUM, VIA: HCPCS | Performed by: STUDENT IN AN ORGANIZED HEALTH CARE EDUCATION/TRAINING PROGRAM

## 2022-09-05 PROCEDURE — 2709999900 HC NON-CHARGEABLE SUPPLY

## 2022-09-05 PROCEDURE — 74011000250 HC RX REV CODE- 250

## 2022-09-05 PROCEDURE — 74011250637 HC RX REV CODE- 250/637: Performed by: STUDENT IN AN ORGANIZED HEALTH CARE EDUCATION/TRAINING PROGRAM

## 2022-09-05 PROCEDURE — 36415 COLL VENOUS BLD VENIPUNCTURE: CPT

## 2022-09-05 PROCEDURE — 77030037877 HC DRSG MEPILEX >48IN BORD MOLN -A

## 2022-09-05 PROCEDURE — 74011250636 HC RX REV CODE- 250/636

## 2022-09-05 PROCEDURE — 77010033678 HC OXYGEN DAILY

## 2022-09-05 PROCEDURE — 99232 SBSQ HOSP IP/OBS MODERATE 35: CPT | Performed by: FAMILY MEDICINE

## 2022-09-05 PROCEDURE — 85025 COMPLETE CBC W/AUTO DIFF WBC: CPT

## 2022-09-05 PROCEDURE — 93005 ELECTROCARDIOGRAM TRACING: CPT

## 2022-09-05 PROCEDURE — 84484 ASSAY OF TROPONIN QUANT: CPT

## 2022-09-05 PROCEDURE — 80048 BASIC METABOLIC PNL TOTAL CA: CPT

## 2022-09-05 PROCEDURE — 65270000046 HC RM TELEMETRY

## 2022-09-05 PROCEDURE — 94761 N-INVAS EAR/PLS OXIMETRY MLT: CPT

## 2022-09-05 PROCEDURE — 82962 GLUCOSE BLOOD TEST: CPT

## 2022-09-05 PROCEDURE — 74011250636 HC RX REV CODE- 250/636: Performed by: STUDENT IN AN ORGANIZED HEALTH CARE EDUCATION/TRAINING PROGRAM

## 2022-09-05 RX ORDER — AMOXICILLIN 250 MG
1 CAPSULE ORAL EVERY 12 HOURS
Status: DISCONTINUED | OUTPATIENT
Start: 2022-09-05 | End: 2022-09-10 | Stop reason: HOSPADM

## 2022-09-05 RX ORDER — WARFARIN 1 MG/1
1 TABLET ORAL ONCE
Status: COMPLETED | OUTPATIENT
Start: 2022-09-05 | End: 2022-09-05

## 2022-09-05 RX ORDER — BISACODYL 5 MG
5 TABLET, DELAYED RELEASE (ENTERIC COATED) ORAL EVERY 12 HOURS
Status: DISCONTINUED | OUTPATIENT
Start: 2022-09-05 | End: 2022-09-10 | Stop reason: HOSPADM

## 2022-09-05 RX ORDER — POLYETHYLENE GLYCOL 3350 17 G/17G
17 POWDER, FOR SOLUTION ORAL DAILY
Status: DISCONTINUED | OUTPATIENT
Start: 2022-09-05 | End: 2022-09-05

## 2022-09-05 RX ORDER — POLYETHYLENE GLYCOL 3350 17 G/17G
17 POWDER, FOR SOLUTION ORAL EVERY 12 HOURS
Status: DISCONTINUED | OUTPATIENT
Start: 2022-09-05 | End: 2022-09-10 | Stop reason: HOSPADM

## 2022-09-05 RX ADMIN — NITROGLYCERIN 0.4 MG: 0.4 TABLET, ORALLY DISINTEGRATING SUBLINGUAL at 12:58

## 2022-09-05 RX ADMIN — BISACODYL 5 MG: 5 TABLET, COATED ORAL at 09:30

## 2022-09-05 RX ADMIN — SODIUM CHLORIDE 350 MG: 900 INJECTION, SOLUTION INTRAVENOUS at 12:16

## 2022-09-05 RX ADMIN — POTASSIUM CHLORIDE 20 MEQ: 750 TABLET, FILM COATED, EXTENDED RELEASE ORAL at 09:29

## 2022-09-05 RX ADMIN — Medication 10 ML: at 14:02

## 2022-09-05 RX ADMIN — WARFARIN SODIUM 1 MG: 1 TABLET ORAL at 17:58

## 2022-09-05 RX ADMIN — CALCITRIOL CAPSULES 0.25 MCG 0.5 MCG: 0.25 CAPSULE ORAL at 09:31

## 2022-09-05 RX ADMIN — ONDANSETRON 8 MG: 2 INJECTION INTRAMUSCULAR; INTRAVENOUS at 17:58

## 2022-09-05 RX ADMIN — SODIUM CHLORIDE, PRESERVATIVE FREE 40 MG: 5 INJECTION INTRAVENOUS at 09:29

## 2022-09-05 RX ADMIN — DOCUSATE SODIUM 50MG AND SENNOSIDES 8.6MG 1 TABLET: 8.6; 5 TABLET, FILM COATED ORAL at 18:10

## 2022-09-05 RX ADMIN — LIDOCAINE HYDROCHLORIDE 40 ML: 20 SOLUTION TOPICAL at 12:31

## 2022-09-05 RX ADMIN — FOLIC ACID 1 MG: 1 TABLET ORAL at 09:31

## 2022-09-05 RX ADMIN — SUCRALFATE 1 G: 1 TABLET ORAL at 17:58

## 2022-09-05 RX ADMIN — ONDANSETRON 8 MG: 2 INJECTION INTRAMUSCULAR; INTRAVENOUS at 06:35

## 2022-09-05 RX ADMIN — SUCRALFATE 1 G: 1 TABLET ORAL at 12:15

## 2022-09-05 RX ADMIN — Medication 5 ML: at 06:39

## 2022-09-05 RX ADMIN — ALLOPURINOL 100 MG: 100 TABLET ORAL at 09:30

## 2022-09-05 RX ADMIN — ONDANSETRON 8 MG: 2 INJECTION INTRAMUSCULAR; INTRAVENOUS at 23:03

## 2022-09-05 RX ADMIN — SUCRALFATE 1 G: 1 TABLET ORAL at 23:02

## 2022-09-05 RX ADMIN — SODIUM CHLORIDE, PRESERVATIVE FREE 40 MG: 5 INJECTION INTRAVENOUS at 23:00

## 2022-09-05 RX ADMIN — SUCRALFATE 1 G: 1 TABLET ORAL at 06:38

## 2022-09-05 RX ADMIN — DOCUSATE SODIUM 50MG AND SENNOSIDES 8.6MG 1 TABLET: 8.6; 5 TABLET, FILM COATED ORAL at 09:30

## 2022-09-05 RX ADMIN — ATORVASTATIN CALCIUM 80 MG: 20 TABLET, FILM COATED ORAL at 22:59

## 2022-09-05 RX ADMIN — ONDANSETRON 8 MG: 2 INJECTION INTRAMUSCULAR; INTRAVENOUS at 12:15

## 2022-09-05 RX ADMIN — POLYETHYLENE GLYCOL 3350 17 G: 17 POWDER, FOR SOLUTION ORAL at 09:28

## 2022-09-05 RX ADMIN — BISACODYL 5 MG: 5 TABLET, DELAYED RELEASE ORAL at 18:10

## 2022-09-05 RX ADMIN — ONDANSETRON 8 MG: 2 INJECTION INTRAMUSCULAR; INTRAVENOUS at 00:21

## 2022-09-05 RX ADMIN — POLYETHYLENE GLYCOL 3350 17 G: 17 POWDER, FOR SOLUTION ORAL at 18:10

## 2022-09-05 RX ADMIN — Medication 10 ML: at 23:02

## 2022-09-05 NOTE — PROGRESS NOTES
Problem: Falls - Risk of  Goal: *Absence of Falls  Description: Document Xiomy Herron Fall Risk and appropriate interventions in the flowsheet. Outcome: Progressing Towards Goal  Note: Fall Risk Interventions:  Mobility Interventions: Patient to call before getting OOB, Utilize walker, cane, or other assistive device         Medication Interventions: Patient to call before getting OOB, Teach patient to arise slowly    Elimination Interventions: Call light in reach, Elevated toilet seat, Patient to call for help with toileting needs, Stay With Me (per policy), Toilet paper/wipes in reach    History of Falls Interventions: Bed/chair exit alarm         Problem: Pressure Injury - Risk of  Goal: *Prevention of pressure injury  Description: Document Randy Scale and appropriate interventions in the flowsheet.   Outcome: Progressing Towards Goal  Note: Pressure Injury Interventions:  Sensory Interventions: Assess changes in LOC, Check visual cues for pain    Moisture Interventions: Assess need for specialty bed, Limit adult briefs, Minimize layers, Moisture barrier, Maintain skin hydration (lotion/cream)    Activity Interventions: Assess need for specialty bed    Mobility Interventions: Assess need for specialty bed, HOB 30 degrees or less    Nutrition Interventions: Document food/fluid/supplement intake, Offer support with meals,snacks and hydration    Friction and Shear Interventions: HOB 30 degrees or less, Minimize layers, Sit at 90-degree angle

## 2022-09-05 NOTE — PROGRESS NOTES
ppai  [address] [facility] [address]acey [address]   Office [phone]  Fax [phone]          Subjective / Objective     Subjective  Overnight Events:  Call for clarification about diet -- order is for CLD but pt has been eating FLD    Patient seen and examined at bedside, looks uncomfortable. Reports improvement in n/v. Worsening epigastric-->diffuse abdominal pain, 9/10 since last night. Continued CP, similar to chronic pain but now persistent. Endorsing racing heart since LN. Also endorsing frontal HA 8/10. Says last BM 4 days ago, now having rectal soreness. Requesting switching from PD-->HD as the diasylate makes her feel sick. Patient would like to see a . Respiratory: O2 Device: Nasal cannula 2L, sating 100%  Visit Vitals  /84 (BP 1 Location: Right lower arm, BP Patient Position: At rest;Sitting)   Pulse (!) 101   Temp 98.8 °F (37.1 °C)   Resp 17   Ht 5' 10\" (1.778 m)   Wt 270 lb 1 oz (122.5 kg)   SpO2 100%   BMI 38.75 kg/m²     Physical Examination:   General appearance - alert, in no acute distress, appears uncomfortable. Chest - clear to auscultation, no wheezes, rales or rhonchi, symmetric air entry. Heart - normal rate, regular rhythm, normal S1, S2, no murmurs, rubs, clicks or gallops  Abdomen - soft, nondistended, no masses or organomegaly. No guarding. TTP. Neurological - alert, oriented, normal speech, no focal findings  Skin - warm, dry. No notable rashes  Extremities - No clubbing or cyanosis. Moving all extremities spontaneously.   Psychiatric - normal speech and thought processes    I/O:  09/04 0701 - 09/05 0700  In: 100 [P.O.:100]  Out: 88   Inpatient Medications  Current Facility-Administered Medications   Medication    pantoprazole (PROTONIX) 40 mg in 0.9% sodium chloride 10 mL injection    bisacodyL (DULCOLAX) tablet 5 mg    senna-docusate (PERICOLACE) 8.6-50 mg per tablet 1 Tablet    Warfarin Pharmacy to dose    ondansetron (ZOFRAN) injection 8 mg    epoetin charlie-epbx (RETACRIT) injection 10,000 Units    gentamicin (GARAMYCIN) 0.1 % ointment    peritoneal dialysis DEXTROSE 1.5% (2.5 mEq/L low calcium) solution 6,000 mL    peritoneal dialysis DEXTROSE 1.5% (2.5 mEq/L low calcium) solution 6,000 mL    gentamicin (GARAMYCIN) 0.1 % cream    nitroglycerin (NITROSTAT) tablet 0.4 mg    sodium chloride (NS) flush 5-40 mL    sodium chloride (NS) flush 5-40 mL    acetaminophen (TYLENOL) tablet 650 mg    Or    acetaminophen (TYLENOL) suppository 650 mg    polyethylene glycol (MIRALAX) packet 17 g    albuterol-ipratropium (DUO-NEB) 2.5 MG-0.5 MG/3 ML    allopurinoL (ZYLOPRIM) tablet 100 mg    atorvastatin (LIPITOR) tablet 80 mg    calcitRIOL (ROCALTROL) capsule 0.5 mcg    folic acid (FOLVITE) tablet 1 mg    midodrine (PROAMATINE) tablet 2.5 mg    potassium chloride SR (KLOR-CON 10) tablet 20 mEq    sucralfate (CARAFATE) tablet 1 g     Current Facility-Administered Medications   Medication Dose Route Frequency    pantoprazole (PROTONIX) 40 mg in 0.9% sodium chloride 10 mL injection  40 mg IntraVENous Q12H    bisacodyL (DULCOLAX) tablet 5 mg  5 mg Oral DAILY    senna-docusate (PERICOLACE) 8.6-50 mg per tablet 1 Tablet  1 Tablet Oral DAILY    Warfarin Pharmacy to dose   Other Rx Dosing/Monitoring    ondansetron (ZOFRAN) injection 8 mg  8 mg IntraVENous Q6H    epoetin charlie-epbx (RETACRIT) injection 10,000 Units  10,000 Units SubCUTAneous Q7D    gentamicin (GARAMYCIN) 0.1 % ointment   Topical DAILY    peritoneal dialysis DEXTROSE 1.5% (2.5 mEq/L low calcium) solution 6,000 mL  6,000 mL IntraPERitoneal DAILY    peritoneal dialysis DEXTROSE 1.5% (2.5 mEq/L low calcium) solution 6,000 mL  6,000 mL IntraPERitoneal DAILY    gentamicin (GARAMYCIN) 0.1 % cream   Topical DIALYSIS PRN    nitroglycerin (NITROSTAT) tablet 0.4 mg  0.4 mg SubLINGual Q5MIN PRN    sodium chloride (NS) flush 5-40 mL  5-40 mL IntraVENous Q8H    sodium chloride (NS) flush 5-40 mL  5-40 mL IntraVENous PRN    acetaminophen (TYLENOL) tablet 650 mg  650 mg Oral Q6H PRN    Or    acetaminophen (TYLENOL) suppository 650 mg  650 mg Rectal Q6H PRN    polyethylene glycol (MIRALAX) packet 17 g  17 g Oral DAILY PRN    albuterol-ipratropium (DUO-NEB) 2.5 MG-0.5 MG/3 ML  3 mL Nebulization Q6H PRN    allopurinoL (ZYLOPRIM) tablet 100 mg  100 mg Oral DAILY    atorvastatin (LIPITOR) tablet 80 mg  80 mg Oral QHS    calcitRIOL (ROCALTROL) capsule 0.5 mcg  0.5 mcg Oral DAILY    folic acid (FOLVITE) tablet 1 mg  1 mg Oral DAILY    midodrine (PROAMATINE) tablet 2.5 mg  2.5 mg Oral BID    potassium chloride SR (KLOR-CON 10) tablet 20 mEq  20 mEq Oral DAILY    sucralfate (CARAFATE) tablet 1 g  1 g Oral AC&HS     Allergies  Allergies   Allergen Reactions    Contrast Dye [Iodine] Anaphylaxis     Tolerates when pre medicated w/ IV solumedrol and benadryl prior to procedure     Shellfish Derived Anaphylaxis    Levaquin [Levofloxacin] Nausea and Vomiting    Morphine Hives and Itching    Nsaids (Non-Steroidal Anti-Inflammatory Drug) Nausea and Vomiting     CBC:  Recent Labs     09/05/22 0315 09/04/22  0420 09/03/22  1700 09/03/22  0301   WBC 10.3 10.4  --  11.8*   HGB 7.0* 7.0* 7.1* 7.3*    183  --  979       Metabolic Panel:  Recent Labs     09/05/22 0315 09/04/22  0420 09/03/22  1700 09/03/22  0301   * 133*  --  133*   K 3.5 3.5  --  3.1*   CL 98 98  --  99   CO2 27 26  --  25   BUN 50* 49*  --  53*   CREA 10.30* 10.20*  --  9.87*   GLU 93 98  --  101*   CA 8.8 8.5  --  8.3*   INR 1.3* 1.3* 2.3* >13.5*       Imaging/procedures:   CT ABD PELV WO CONT    Result Date: 9/1/2022  1. Increasing moderate volume ascites. Right abdominal peritoneal catheter. 2.  Grossly unchanged right mid renal indeterminate lesion in several additional bilateral likely cystic lesions as described above. 3.  Bilateral nonobstructive nephrolithiasis. 4.  Cholecystectomy. 5.  Evidence of chronic interstitial lung disease.           Assessment and Plan     Mancel Cable Matthew Carye is a 61 y.o. female with PMH ESRD on PD, CAD with MI 2012, HFpEF, ILD on 2L home O2, JASEN, GERD, h/o DVT in RLE on warfarin, HTN, HLD, admitted with intractable n/v and epigastric pain. Intractable nausea and vomiting with epigastric abd pain: Lipase 662. CT AP showed increasing moderate volume ascites, unchanged b/l nonobstructive nephrolithiasis. Possibly 2/2 gastroparesis vs CHF vs uremia. Peritoneal fluid cx no growth. GI no need for endoscopy. -Zofran 8mg q6h  -Protonix  -GI consulted, appreciate recommendations  -Nephrology consulted, appreciate recommendations  -Miralax daily   -Erythromycin    Anemia: Hb 7.0 (BL 10), MCV 99. Having worsening CP, tachycardia. - Monitor for symptoms, signs of bleeding  - Consent for blood products  - Consider transfusion if Hb <7     Chronic hypokalemia: Improved. Baseline 3.3. POA 2.8-->3.5. On potassium chloride 20 meq daily at home. Likely 2/2 continued nausea and vomiting. EKG wnl.  -Continue home potassium chloride 20 meq daily   -Monitor BMP    Supratherapeutic INR on warfarin: (Resolved) INR 13.5 on 9/3, 1.3 on 9/5.  -Home warfarin, pharmacy to dose  -PT/INR daily     Hypotension in the setting of HTN: recently taken off of home carvedilol 6.25 mg and Imdur 120 mg CR by her PCP due to low BPs. Also started on midodrine 2.5 mg BID. -Hold home carvedilol 6.25 mg and Imdur 120 mg CR  -Continue midodrine 2.5 mg BID  -Will continue to monitor at this time and readjust as BP's trend. ESRD on PD:  Follows with Dr Rachel Lin (nephrology). -Consult nephrology, appreciate recommendations  -Avoid Nephrotoxins  -Renally dose medications  -Continue Calcitriol 0.5mg daily     Coronary artery disease with history of MI: 1 SAL to OM 2004, MI in 2012 s/p 2 SAL to OM2/OM3. Has chronic proximal occlusion with L to R collaterals. Follows with Shaheen Steward (cardiologist). Echo with EF 60-65%.  Worsening CP and tachycardia as of 9/5.  - EKG, trop, CXR  - Remote tele  - Cont home lipitor 80mg QHS     Chronic HFpEF: Per chart review last echo in 2019 (EF 66-70%). Pt reports recent admission to outside hospital for diuresis. Echo EF 60-65%. -Monitor for signs/symptoms of HF     Interstitial lung disease: With chronic hypoxia on 2 L oxygen via nasal cannula at home. Biopsy negative for sarcoid per patient. Attributed to smoking history. - Duonebs prn  - Humidified air  - Home 2L O2     Chronic sacral ulcer: painful.   -Consult inpatient wound care     T2DM with CKD: Last HgA1C  4.7 (3/25/22). Not on any medications currently. POA glucose 126.   - Monitor on daily labs     GERD Stable. -Continue home sucralfate   -Protonix 40 IV daily     Hx of DVT: Of right leg. No recurrence. Home warfarin 1 mg MWF with 2.5 mg Tue Thur Sat.   -Home  warfarin, pharmacy to dose  -PT/INR daily     History of gout: Takes allopurinol 100 mg daily. Has not had a recent flare.   -Continue home allopurinol 100 mg daily     Intermittent constipation: Takes docusate sodium daily. -MiraLAX PRN     Hyperlipidemia: Total 100, TG 78, HDL 51, LDL 33.4.   -Continue home Lipitor 80 mg nightly    Obstructive sleep apnea: Uses CPAP nightly, well-controlled at this time. Former smoker: 0.5 PPD for 41 years for 20.5-pack-year history. Quit on 11/9/2014. Obesity: PT with BMI of Body mass index is 38.74 kg/m². - Encouraging lifestyle modifications and further follow up outpatient.     Patient discussed with Irving Dubin, MD  [facility] Medicine Resident       For Billing    Chief Complaint   Patient presents with    Nausea    Vomiting    Fatigue       Hospital Problems  Date Reviewed: 9/2/2022            Codes Class Noted POA    * (Principal) Intractable vomiting with nausea ICD-10-CM: R11.2  ICD-9-CM: 536.2  9/2/2022 Yes        Hypokalemia ICD-10-CM: E87.6  ICD-9-CM: 276.8  7/23/2022 Yes        Supratherapeutic INR ICD-10-CM: R79.1  ICD-9-CM: 790.92  9/2/2022 Yes Hyponatremia ICD-10-CM: E87.1  ICD-9-CM: 276.1  9/2/2022 Yes        Abdominal pain ICD-10-CM: R10.9  ICD-9-CM: 789.00  7/23/2022 Yes        (HFpEF) heart failure with preserved ejection fraction (HCC) ICD-10-CM: I50.30  ICD-9-CM: 428.9  9/23/2018 Yes        ESRD on peritoneal dialysis [facility]) ICD-10-CM: N18.6, Z99.2  ICD-9-CM: 585.6, V45.11  6/23/2018 Yes        Hx of deep venous thrombosis ICD-10-CM: Z86.718  ICD-9-CM: V12.51  5/30/2018 Yes        Diabetic gastroparesis (HCC) ICD-10-CM: E11.43, K31.84  ICD-9-CM: 250.60, 536.3  Unknown Yes        GERD (gastroesophageal reflux disease) ICD-10-CM: K21.9  ICD-9-CM: 530.81  Unknown Yes        DM type 2 causing neurological disease ([facility] ICD-10-CM: E11.49  ICD-9-CM: 250.60  Unknown Yes        Gout ICD-10-CM: M10.9  ICD-9-CM: 274.9  Unknown Yes        ILD (interstitial lung disease) ([facility] ICD-10-CM: J84.9  ICD-9-CM: 300  8/20/2017 Yes        Warfarin anticoagulation ICD-10-CM: Z79.01  ICD-9-CM: V58.61  8/20/2017 Yes        HTN (hypertension) (Chronic) ICD-10-CM: I10  ICD-9-CM: 401.9  10/1/2012 Yes        Coronary artery disease (Chronic) ICD-10-CM: I25.10  ICD-9-CM: 414.00  2/16/2011 Yes    Overview Addendum 10/5/2012  7:33 AM by Michelle Cramer, [address] 2004  1v CAD by cath - PCI OM with SAL  Cath 5/2004 - patent stent, no new disease  Lexiscan cardiolite 12/09- normal perfusion, EF 66%  Cath: 3/19/12: PA 55/30 mean 41, wedge 26, RA 24, EDP 35, LVG 55%, LM normal, LAD normal, LCX - OM1 patent stent, OM2 prox 85% long, % with L to R collaterals                JASEN on CPAP (Chronic) ICD-10-CM: G47.33, Z99.89  ICD-9-CM: 327.23, V46.8  2/16/2011 Yes    Overview Signed 3/21/2012  8:04 AM by Flaquita Scales CPAP

## 2022-09-05 NOTE — PROGRESS NOTES
Bedside shift change report given to Bianca (oncoming nurse) by Krystal Bailey (offgoing nurse). Report included the following information SBAR, Kardex, Intake/Output, MAR, and Recent Results.

## 2022-09-05 NOTE — PROGRESS NOTES
Spiritual Care Assessment/Progress Note  [address]      NAME: Radha Jeong      MRN: [id number]  AGE: 61 y.o. SEX: female  Gnosticism Affiliation: [facility]   Language: English     9/5/2022     Total Time (in minutes): 25     Spiritual Assessment begun in [facility] 5M1 MED SURG 1 through conversation with:         [x]Patient        [x] Family    [] Friend(s)        Reason for Consult: Request by patient     Spiritual beliefs: (Please include comment if needed)     [x] Identifies with a monserrat tradition: [facility]       [] Supported by a monserrat community:            [] Claims no spiritual orientation:           [] Seeking spiritual identity:                [] Adheres to an individual form of spirituality:           [] Not able to assess:                           Identified resources for coping:      [x] Prayer                               [] Music                  [] Guided Imagery     [x] Family/friends                 [] Pet visits     [] Devotional reading                         [] Unknown     [] Other:                                               Interventions offered during this visit: (See comments for more details)    Patient Interventions: Affirmation of emotions/emotional suffering, Affirmation of monserrat, Catharsis/review of pertinent events in supportive environment, Coping skills reviewed/reinforced, Iconic (affirming the presence of God/Higher Power), Normalization of emotional/spiritual concerns, Prayer (actual), Prayer (assurance of)     Family/Friend(s):  Affirmation of emotions/emotional suffering, Affirmation of monserrat, Prayer (actual), Prayer (assurance of), Iconic (affirming the presence of God/Higher Power), Coping skills reviewed/reinforced, Normalization of emotional/spiritual concerns     Plan of Care:     [] Support spiritual and/or cultural needs    [] Support AMD and/or advance care planning process      [] Support grieving process   [] Coordinate Rites and/or Rituals    [] Coordination with community clergy   [] No spiritual needs identified at this time   [] Detailed Plan of Care below (See Comments)  [] Make referral to Music Therapy  [] Make referral to Pet Therapy     [] Make referral to Addiction services  [] Make referral to [facility]  [] Make referral to Spiritual Care Partner  [] No future visits requested        [x] Follow up visits as needed     Visited patient for initial spiritual assessment. Her chart was consulted. Patient knows and is known by  from previous hospital stays. Her  Yan Jenkins was at bedside. Both have considerable health issues and support each other. Listened to her account of the issues leading to this hospital stay and hopes of resolution of those issues and a return to her home.    Chaplain Dale, MDiv, MS, [facility]

## 2022-09-05 NOTE — PROGRESS NOTES
[facility] Dosing Services: 9/5/2022    Consult for Warfarin Dosing by Pharmacy by Dr. Madhavi Angel provided for this 61 y.o.  female , for indication of History of VTE (confirmed no current VTE)  Day of Therapy: Resumed from PTA medication list  Home Regimen: Warfarin 1 mg on MWF, 2.5 mg on TTSS  Dose to achieve an INR goal of 2-3    Patient normally receives 1 mg on Mondays. Will avoid dose increase for now given supratherapeutic INR of 13.5 on 9/3 and addition of erythromycin which may increase the INR. Patient s/p IV vitamin K on 9/3 PM- INR will likely be slow to respond to dose adjustments. Significant drug interactions: Erythromycin IV (may increase INR); allopurinol (home)  Previous dose given 2.5 mg on 9/4  2.5 mg on 9/3   PT/INR Lab Results   Component Value Date/Time    INR 1.3 (H) 09/05/2022 03:15 AM      Platelets Lab Results   Component Value Date/Time    PLATELET 839 76/37/2580 03:15 AM      H/H Lab Results   Component Value Date/Time    HGB 7.0 (L) 09/05/2022 03:15 AM        Pharmacy to follow daily and will provide subsequent Warfarin dosing based on clinical status.   JOHNSON Holly)  Contact information [phone]

## 2022-09-05 NOTE — CONSULTS
Comprehensive Nutrition Assessment    Type and Reason for Visit: Positive nutrition screen    Nutrition Recommendations/Plan:   Begin ONS - Renal Supplement daily provides: 420 kcal, 38 g carb, 19 g protein   Obtain measured weight   Document meal and ONS intake on flow sheets  Consider POC Glucose monitoring with sliding scale coverage PRN - hx of DM & GP      Malnutrition Assessment:  Malnutrition Status: Moderate malnutrition (09/05/22 1517)    Context:  Acute illness     Findings of the 6 clinical characteristics of malnutrition:   Energy Intake:  50% or less of est energy requirements for 5 or more days  Weight Loss:  5% over one month     Body Fat Loss:  Mild body fat loss, Triceps   Muscle Mass Loss:  No significant muscle mass loss,    Fluid Accumulation:  Unable to assess (related to ESRD, no clubbing), Generalized   Strength:  Not performed     Nutrition Assessment:      55: 61year old female admitted for Abdominal pain [R10.9] who has a past medical history of multiple medical issues briefly including Chronic pain, ESKD-CCPD (was on PD, has AVD and wants to resume HD), Anuric, Diabetic gastroparesis, Diastolic heart failure, DM type 2 causing neurological disease, renal disease, and vascular disease; Esophageal stricture, GERD, and Morbid obesity. RD visited patient room,  in the room, pt sitting in chair, feeling nauseous. Pt reports she hasn't been able to eat for days & has been losing weight since a recent admission to other facility 2 months ago. UBW (dry) is 277 lbs, recent wt with fluid overload was over 330 lbs, and lowest recent weight was about 250 lbs. Pt reports she can't have acid foods or tomato based foods, so she declined most of the full liquid lunch offered. She reports she had some of her own jello from home because she likes a specific flavor. She then c/o of chest pain. This RD used the call bell to notify nurse & Pt's  gave pt a nitro (available in room).  RN updated in the hallway, on her way into the room. Diet has been modified to include solid foods, patient is able to order foods to improve intake. Will begin ONS daily & check tolerance & acceptance. Hx of DM, ESRD on PD/HD any A1c% will be unreliable. Fasting blood glucose appears WDL, however poor PO currently & volume overloaded. If pt is able to tolerate more PO - consider POC AC/HS BG monitoring with insulin coverage PRN. Check measured weight - since currently it is stated. Standing weight preferred as tolerated. Nutrition Related Findings:    nausea, chest pain, edema, poor intake for 2 months reported Wound Type: Pressure injury, Stage I (sacral, stage 1 - POA)  Last Bowel Movement Date: 09/05/22  Stool Appearance: Loose  Abdominal Assessment: Constipation, Nausea, Obese  Bowel Sounds: Hypoactive     Edema:LLE: 2+; Pitting (9/4/2022  8:00 PM)  RLE: 3+; Pitting (9/4/2022  8:00 PM)    Nutr. Labs:  Lab Results   Component Value Date/Time    GFR est AA 5 (L) 09/05/2022 03:15 AM    GFR est non-AA 4 (L) 09/05/2022 03:15 AM    Creatinine (POC) 6.77 (H) 04/14/2022 08:41 AM    Creatinine 10.30 (H) 09/05/2022 03:15 AM    BUN 50 (H) 09/05/2022 03:15 AM    BUN (POC) 30 (H) 05/24/2019 08:18 AM    Sodium (POC) 141 04/14/2022 08:41 AM    Sodium 134 (L) 09/05/2022 03:15 AM    Potassium 3.5 09/05/2022 03:15 AM    Potassium (POC) 3.1 (L) 04/14/2022 08:41 AM    Chloride (POC) 104 04/14/2022 08:41 AM    Chloride 98 09/05/2022 03:15 AM    CO2 27 09/05/2022 03:15 AM     Lab Results   Component Value Date/Time    Glucose 93 09/05/2022 03:15 AM    Glucose (POC) 106 07/24/2022 02:43 AM     Lab Results   Component Value Date/Time    Hemoglobin A1c 4.7 03/25/2022 03:07 PM    Hemoglobin A1c, External 5.0 05/19/2021 12:00 AM       Nutr.  Meds:  Lipitor, allopurinol, dulcolax, erythromycin, folic acid, protonix, miralax, pericolace, carafate,   PRN: nitro, zofran    Current Nutrition Intake & Therapies:  Average Meal Intake: 1-25%  Average Supplement Intake: None ordered  ADULT DIET Regular; Low Sodium (2 gm); GI [place] (GERD/Peptic Ulcer); does well with chicken noodle soup  Additional Calorie Sources:  own jello/other home snacks due to prefs    Documented Meal intake:  No data found. Documentation of supplement intake:  No data found. Anthropometric Measures:  Height: 5' 10\" (177.8 cm)  Ideal Body Weight (IBW): 150 lbs (68 kg)  Admission Body Weight: 270 lb  Current Body Wt:  118.8 kg (262 lb), 174.7 % IBW. Stated  Current BMI (kg/m2): 37.6  Usual Body Weight: 125.6 kg (277 lb)  % Weight Change (Calculated): -5.4  Weight Adjustment: No adjustment                 BMI Category: Obese class 2 (BMI 35.0-39. 9)    Last 3 Recorded Weights in this Encounter    09/01/22 2010 09/02/22 0715 09/05/22 1003   Weight: 122.5 kg (270 lb) 122.5 kg (270 lb 1 oz) 118.8 kg (262 lb)     Wt Readings from Last 10 Encounters:   09/05/22 118.8 kg (262 lb)   08/08/22 133.8 kg (295 lb)   07/26/22 128.8 kg (284 lb)   07/06/22 127 kg (280 lb)   06/08/22 125.2 kg (276 lb)   06/01/22 127 kg (280 lb)   05/05/22 127.2 kg (280 lb 6.8 oz)   04/14/22 127 kg (279 lb 15.8 oz)   04/12/22 127 kg (279 lb 15.8 oz)   03/25/22 127 kg (279 lb 15.8 oz)     Estimated Daily Nutrient Needs:  Energy Requirements Based On: Formula  Weight Used for Energy Requirements: Current  Energy (kcal/day): 2180 - 2365 kcal/d (MSJ xAF 1.2 - 1.3)  Weight Used for Protein Requirements: Ideal  Protein (g/day): 100 - 122 g/d (1.5-1.8 g/kg of IBW)  Method Used for Fluid Requirements: Standard renal  Fluid (ml/day): 1500 mL    Nutrition Diagnosis:   Inadequate oral intake related to altered GI function as evidenced by nausea, poor intake prior to admission, wt loss of ~5% over the last 1-2 months (reported/stated weight measurements) and hx of gastroparesis.      Nutrition Interventions:   Food and/or Nutrient Delivery: Modify current diet, Start oral nutrition supplement  Nutrition Education/Counseling: Education not indicated  Coordination of Nutrition Care: Continue to monitor while inpatient, Interdisciplinary rounds  Plan of Care discussed with: patient, , RN    Goals:     Goals: PO intake 50% or greater, by next RD assessment       Nutrition Monitoring and Evaluation:   Behavioral-Environmental Outcomes: None identified  Food/Nutrient Intake Outcomes: Food and nutrient intake  Physical Signs/Symptoms Outcomes: Weight, Nausea/vomiting, Biochemical data    Discharge Planning:    Continue oral nutrition supplement    Payam Sethi RD, MS  Contact via [place] or office [phone]

## 2022-09-05 NOTE — PROGRESS NOTES
9/5/2022  5:00 PM  Order for HD placement. CM met w/ pt currently is PD at home, to convert to HD, she has done HD before at [address] [place] [address]. Clinicals sent to [address] [place] [address] in [employer]. MD will need to order: Hep B Surface Antigen, Hep B Surface antibody and Hep B Total Core antibody.   CM notified Family Practice they  will order labs  CM will follow and assist.  JUAN PABLO Simpson

## 2022-09-05 NOTE — PROGRESS NOTES
Patient refused to get on the standing scale, will try again in a different time. Patient was weighed in the bed this morning using the bed scale 262 lbs. Patient weight documented; using the standing scale. 1955  Bedside and Verbal shift change report given to Deborah Sapp RN (oncoming nurse) by Antonoi Pittman RN (offgoing nurse).  Report included the following information SBAR, Kardex, ED Summary, Procedure Summary, Intake/Output, MAR, and Recent Results. '

## 2022-09-05 NOTE — DIALYSIS
[facility]       [phone]            Pt orders, notes, labs, code status and consent reviewed. Time out complete. Last Fill: 0   I-Drain: 0 ml   Total UF: 88 ml   Net Output: 88 ml   Added Dwell Time: 11 mins   Lost Dwell Time: 0 mins   Average Dwell Time: 1 hr 42 mins   Effluent: Yellow/clear         At patient bedside for disconnection from PD cycler. Catheter scrubbed/soaked with Alcavis for two minutes prior to disconnection followed by sterile mini cap application. Line secured to abdomen. Old cassette/bags discarded in red biohazard.  Education pre/post. Bed in lowest position, call bell and personal belongings within reach  SBAR to Primary, RN

## 2022-09-06 LAB
ANION GAP SERPL CALC-SCNC: 9 MMOL/L (ref 5–15)
ATRIAL RATE: 98 BPM
BASOPHILS # BLD: 0 K/UL (ref 0–0.1)
BASOPHILS NFR BLD: 0 % (ref 0–1)
BUN SERPL-MCNC: 54 MG/DL (ref 6–20)
BUN/CREAT SERPL: 5 (ref 12–20)
CALCIUM SERPL-MCNC: 8.5 MG/DL (ref 8.5–10.1)
CALCULATED P AXIS, ECG09: 43 DEGREES
CALCULATED R AXIS, ECG10: 9 DEGREES
CALCULATED T AXIS, ECG11: 46 DEGREES
CHLORIDE SERPL-SCNC: 100 MMOL/L (ref 97–108)
CO2 SERPL-SCNC: 25 MMOL/L (ref 21–32)
COMMENT, HOLDF: NORMAL
CREAT SERPL-MCNC: 11.1 MG/DL (ref 0.55–1.02)
DIAGNOSIS, 93000: NORMAL
DIFFERENTIAL METHOD BLD: ABNORMAL
EOSINOPHIL # BLD: 0.6 K/UL (ref 0–0.4)
EOSINOPHIL NFR BLD: 6 % (ref 0–7)
ERYTHROCYTE [DISTWIDTH] IN BLOOD BY AUTOMATED COUNT: 14.1 % (ref 11.5–14.5)
GLUCOSE SERPL-MCNC: 87 MG/DL (ref 65–100)
HBV SURFACE AB SER QL: NONREACTIVE
HBV SURFACE AB SER QL: NONREACTIVE
HBV SURFACE AB SER-ACNC: 3.5 MIU/ML
HBV SURFACE AB SER-ACNC: 6.37 MIU/ML
HBV SURFACE AG SER QL: <0.1 INDEX
HBV SURFACE AG SER QL: NEGATIVE
HCT VFR BLD AUTO: 21.3 % (ref 35–47)
HGB BLD-MCNC: 6.8 G/DL (ref 11.5–16)
HISTORY CHECKED?,CKHIST: NORMAL
IMM GRANULOCYTES # BLD AUTO: 0 K/UL (ref 0–0.04)
IMM GRANULOCYTES NFR BLD AUTO: 0 % (ref 0–0.5)
INR PPP: 1.8 (ref 0.9–1.1)
INR PPP: >13.5 (ref 0.9–1.1)
LYMPHOCYTES # BLD: 1.5 K/UL (ref 0.8–3.5)
LYMPHOCYTES NFR BLD: 17 % (ref 12–49)
MCH RBC QN AUTO: 31.6 PG (ref 26–34)
MCHC RBC AUTO-ENTMCNC: 31.9 G/DL (ref 30–36.5)
MCV RBC AUTO: 99.1 FL (ref 80–99)
MONOCYTES # BLD: 0.6 K/UL (ref 0–1)
MONOCYTES NFR BLD: 6 % (ref 5–13)
NEUTS SEG # BLD: 6.3 K/UL (ref 1.8–8)
NEUTS SEG NFR BLD: 70 % (ref 32–75)
NRBC # BLD: 0 K/UL (ref 0–0.01)
NRBC BLD-RTO: 0 PER 100 WBC
P-R INTERVAL, ECG05: 212 MS
PLATELET # BLD AUTO: 185 K/UL (ref 150–400)
PMV BLD AUTO: 11.3 FL (ref 8.9–12.9)
POTASSIUM SERPL-SCNC: 4.1 MMOL/L (ref 3.5–5.1)
PROTHROMBIN TIME: 18.6 SEC (ref 9–11.1)
PROTHROMBIN TIME: >120 SEC (ref 9–11.1)
Q-T INTERVAL, ECG07: 362 MS
QRS DURATION, ECG06: 78 MS
QTC CALCULATION (BEZET), ECG08: 462 MS
RBC # BLD AUTO: 2.15 M/UL (ref 3.8–5.2)
SAMPLES BEING HELD,HOLD: NORMAL
SODIUM SERPL-SCNC: 134 MMOL/L (ref 136–145)
VENTRICULAR RATE, ECG03: 98 BPM
WBC # BLD AUTO: 9 K/UL (ref 3.6–11)

## 2022-09-06 PROCEDURE — 86923 COMPATIBILITY TEST ELECTRIC: CPT

## 2022-09-06 PROCEDURE — 74011250637 HC RX REV CODE- 250/637

## 2022-09-06 PROCEDURE — 74011250637 HC RX REV CODE- 250/637: Performed by: INTERNAL MEDICINE

## 2022-09-06 PROCEDURE — 90935 HEMODIALYSIS ONE EVALUATION: CPT

## 2022-09-06 PROCEDURE — 85025 COMPLETE CBC W/AUTO DIFF WBC: CPT

## 2022-09-06 PROCEDURE — 5A1D70Z PERFORMANCE OF URINARY FILTRATION, INTERMITTENT, LESS THAN 6 HOURS PER DAY: ICD-10-PCS | Performed by: INTERNAL MEDICINE

## 2022-09-06 PROCEDURE — 80048 BASIC METABOLIC PNL TOTAL CA: CPT

## 2022-09-06 PROCEDURE — 74011000250 HC RX REV CODE- 250

## 2022-09-06 PROCEDURE — 94660 CPAP INITIATION&MGMT: CPT

## 2022-09-06 PROCEDURE — 87340 HEPATITIS B SURFACE AG IA: CPT

## 2022-09-06 PROCEDURE — 94761 N-INVAS EAR/PLS OXIMETRY MLT: CPT

## 2022-09-06 PROCEDURE — 36415 COLL VENOUS BLD VENIPUNCTURE: CPT

## 2022-09-06 PROCEDURE — 86900 BLOOD TYPING SEROLOGIC ABO: CPT

## 2022-09-06 PROCEDURE — 86704 HEP B CORE ANTIBODY TOTAL: CPT

## 2022-09-06 PROCEDURE — 86706 HEP B SURFACE ANTIBODY: CPT

## 2022-09-06 PROCEDURE — 30233N1 TRANSFUSION OF NONAUTOLOGOUS RED BLOOD CELLS INTO PERIPHERAL VEIN, PERCUTANEOUS APPROACH: ICD-10-PCS

## 2022-09-06 PROCEDURE — 36430 TRANSFUSION BLD/BLD COMPNT: CPT

## 2022-09-06 PROCEDURE — 74011250636 HC RX REV CODE- 250/636: Performed by: STUDENT IN AN ORGANIZED HEALTH CARE EDUCATION/TRAINING PROGRAM

## 2022-09-06 PROCEDURE — 2709999900 HC NON-CHARGEABLE SUPPLY

## 2022-09-06 PROCEDURE — 74011000250 HC RX REV CODE- 250: Performed by: STUDENT IN AN ORGANIZED HEALTH CARE EDUCATION/TRAINING PROGRAM

## 2022-09-06 PROCEDURE — 77010033678 HC OXYGEN DAILY

## 2022-09-06 PROCEDURE — C9113 INJ PANTOPRAZOLE SODIUM, VIA: HCPCS | Performed by: STUDENT IN AN ORGANIZED HEALTH CARE EDUCATION/TRAINING PROGRAM

## 2022-09-06 PROCEDURE — P9016 RBC LEUKOCYTES REDUCED: HCPCS

## 2022-09-06 PROCEDURE — 74011250636 HC RX REV CODE- 250/636

## 2022-09-06 PROCEDURE — P9040 RBC LEUKOREDUCED IRRADIATED: HCPCS

## 2022-09-06 PROCEDURE — 74011250637 HC RX REV CODE- 250/637: Performed by: STUDENT IN AN ORGANIZED HEALTH CARE EDUCATION/TRAINING PROGRAM

## 2022-09-06 PROCEDURE — 85610 PROTHROMBIN TIME: CPT

## 2022-09-06 PROCEDURE — 65270000046 HC RM TELEMETRY

## 2022-09-06 PROCEDURE — 99232 SBSQ HOSP IP/OBS MODERATE 35: CPT | Performed by: FAMILY MEDICINE

## 2022-09-06 RX ORDER — WARFARIN 2.5 MG/1
2.5 TABLET ORAL ONCE
Status: COMPLETED | OUTPATIENT
Start: 2022-09-06 | End: 2022-09-06

## 2022-09-06 RX ORDER — PANTOPRAZOLE SODIUM 40 MG/1
40 TABLET, DELAYED RELEASE ORAL
Status: DISCONTINUED | OUTPATIENT
Start: 2022-09-06 | End: 2022-09-10 | Stop reason: HOSPADM

## 2022-09-06 RX ORDER — ERYTHROMYCIN 250 MG/1
CAPSULE, DELAYED RELEASE ORAL 4 TIMES DAILY
Status: CANCELLED | OUTPATIENT
Start: 2022-09-06

## 2022-09-06 RX ORDER — ERYTHROMYCIN 250 MG/1
250 CAPSULE, DELAYED RELEASE ORAL
Status: DISCONTINUED | OUTPATIENT
Start: 2022-09-06 | End: 2022-09-10 | Stop reason: HOSPADM

## 2022-09-06 RX ORDER — SODIUM CHLORIDE 9 MG/ML
250 INJECTION, SOLUTION INTRAVENOUS AS NEEDED
Status: DISCONTINUED | OUTPATIENT
Start: 2022-09-06 | End: 2022-09-10 | Stop reason: HOSPADM

## 2022-09-06 RX ADMIN — ATORVASTATIN CALCIUM 80 MG: 20 TABLET, FILM COATED ORAL at 23:59

## 2022-09-06 RX ADMIN — FOLIC ACID 1 MG: 1 TABLET ORAL at 13:00

## 2022-09-06 RX ADMIN — ONDANSETRON 8 MG: 2 INJECTION INTRAMUSCULAR; INTRAVENOUS at 17:43

## 2022-09-06 RX ADMIN — SODIUM CHLORIDE, PRESERVATIVE FREE 40 MG: 5 INJECTION INTRAVENOUS at 09:58

## 2022-09-06 RX ADMIN — LIDOCAINE HYDROCHLORIDE 40 ML: 20 SOLUTION ORAL; TOPICAL at 13:28

## 2022-09-06 RX ADMIN — SUCRALFATE 1 G: 1 TABLET ORAL at 17:43

## 2022-09-06 RX ADMIN — BISACODYL 5 MG: 5 TABLET, DELAYED RELEASE ORAL at 13:00

## 2022-09-06 RX ADMIN — ERYTHROMYCIN 250 MG: 250 CAPSULE, DELAYED RELEASE PELLETS ORAL at 17:50

## 2022-09-06 RX ADMIN — SODIUM CHLORIDE 350 MG: 900 INJECTION, SOLUTION INTRAVENOUS at 12:59

## 2022-09-06 RX ADMIN — SODIUM CHLORIDE 350 MG: 900 INJECTION, SOLUTION INTRAVENOUS at 05:28

## 2022-09-06 RX ADMIN — SUCRALFATE 1 G: 1 TABLET ORAL at 06:56

## 2022-09-06 RX ADMIN — ONDANSETRON 8 MG: 2 INJECTION INTRAMUSCULAR; INTRAVENOUS at 12:59

## 2022-09-06 RX ADMIN — BISACODYL 5 MG: 5 TABLET, DELAYED RELEASE ORAL at 23:59

## 2022-09-06 RX ADMIN — PANTOPRAZOLE SODIUM 40 MG: 40 TABLET, DELAYED RELEASE ORAL at 17:43

## 2022-09-06 RX ADMIN — CALCITRIOL CAPSULES 0.25 MCG 0.5 MCG: 0.25 CAPSULE ORAL at 12:59

## 2022-09-06 RX ADMIN — WARFARIN SODIUM 2.5 MG: 2.5 TABLET ORAL at 17:43

## 2022-09-06 RX ADMIN — ACETAMINOPHEN 650 MG: 325 TABLET, FILM COATED ORAL at 06:56

## 2022-09-06 RX ADMIN — DOCUSATE SODIUM 50MG AND SENNOSIDES 8.6MG 1 TABLET: 8.6; 5 TABLET, FILM COATED ORAL at 13:00

## 2022-09-06 RX ADMIN — POTASSIUM CHLORIDE 20 MEQ: 750 TABLET, FILM COATED, EXTENDED RELEASE ORAL at 12:59

## 2022-09-06 RX ADMIN — SODIUM CHLORIDE, PRESERVATIVE FREE 10 ML: 5 INJECTION INTRAVENOUS at 06:55

## 2022-09-06 RX ADMIN — ALLOPURINOL 100 MG: 100 TABLET ORAL at 13:00

## 2022-09-06 RX ADMIN — POLYETHYLENE GLYCOL 3350 17 G: 17 POWDER, FOR SOLUTION ORAL at 12:59

## 2022-09-06 RX ADMIN — MIDODRINE HYDROCHLORIDE 2.5 MG: 5 TABLET ORAL at 09:58

## 2022-09-06 RX ADMIN — ONDANSETRON 8 MG: 2 INJECTION INTRAMUSCULAR; INTRAVENOUS at 06:55

## 2022-09-06 RX ADMIN — GENTAMICIN SULFATE: 1 OINTMENT TOPICAL at 09:00

## 2022-09-06 RX ADMIN — ONDANSETRON 8 MG: 2 INJECTION INTRAMUSCULAR; INTRAVENOUS at 23:59

## 2022-09-06 RX ADMIN — MIDODRINE HYDROCHLORIDE 2.5 MG: 5 TABLET ORAL at 17:43

## 2022-09-06 RX ADMIN — Medication 10 ML: at 05:34

## 2022-09-06 RX ADMIN — SUCRALFATE 1 G: 1 TABLET ORAL at 13:00

## 2022-09-06 NOTE — PROCEDURES
Hemodialysis / [phone]    Vitals Pre Post Assessment Pre Post   BP BP: (!) 148/72 (09/06/22 0835)   125/48 LOC Alert, awake, conscious and coherent Alert, awake, conscious and coherent   HR Pulse (Heart Rate): 96 (09/06/22 0835) 104 Lungs Clear, no SOB, on nasal cannula at 2lpm Clear, no SOB, on nasal cannula at 2lpm   Resp Resp Rate: 18 (09/06/22 0835) 18 Cardiac Regular rate and rhythm Regular rate and rhythm   Temp Temp: 98.5 °F (36.9 °C) (09/06/22 0815) 98 Skin Intact, dry and warm Intact, dry and warm   Weight  NA NA Edema No edema No edema   Tele status Machine  Machine Pain Pain Intensity 1: 0 (09/06/22 0040) No pain reported      Orders   Duration: Start: 0935 End: 1205 Total: 3.5 hours   Dialyzer:  Revaclear   K Bath: 2   Ca Bath: 2.5   Na: 138   Bicarb: 35   Target Fluid Removal: 3000        Access   Type & Location: RLA AV fistul   Comments:                       RLA AV Fistula, no signs of infection, with thrill  and bruit, no bleeding noted, skin is intact. Cleaned and disinfected as per policy. Cannulated using g15 needle.        Labs   HBsAg (Antigen) / date:   Negative 08/10/22     ( test taken today, awaiting for result)                                       HBsAb (Antibody) / date:   Susceptible 07/25/22   Source: [facility] portal & Epic   Obtained/Reviewed  Critical Results Called HGB   Date Value Ref Range Status   09/05/2022 7.0 (L) 11.5 - 16.0 g/dL Final     Potassium   Date Value Ref Range Status   09/05/2022 3.5 3.5 - 5.1 mmol/L Final     Calcium   Date Value Ref Range Status   09/05/2022 8.8 8.5 - 10.1 MG/DL Final     BUN   Date Value Ref Range Status   09/05/2022 50 (H) 6 - 20 MG/DL Final     Creatinine   Date Value Ref Range Status   09/05/2022 10.30 (H) 0.55 - 1.02 MG/DL Final        Meds Given   Name Dose Route   None                 Adequacy / Fluid    Total Liters Process: 74.4 L   Net Fluid Removed: 2500 mL      Comments   Time Out Done:   (Time) 0930   Admitting Diagnosis: Abdominal pain, ESRD   Consent obtained/signed:  Chronic consent applies     Machine / RO # [id number]    Primary Nurse Rpt Pre: Rhiannon Amin RN   Primary Nurse Rpt Post: Rhiannon Amin RN   Pt Education: Procedure   Care Plan: HD POC   Pts outpatient clinic: [facility] Summary   Comments:                         Dialysis order, medication, labs and consent verified pre dialysis. Time Out done pre dialysis. 0800: SBAR received from Primary Nurse, patient is on stable condition upon endorsement. 1018: Treatment started, safety checks done, vitals signs are stable upon initiation of treatment. 0930: Hospitalist came, discussed the need of blood transfusion, patient agreed. 1000: seen by Dr. Dede Marcelino, agreed to do 3.5 hours instead. 1215: Treatment ended. Blood returned as per policy. BP dropped once at the end of treatment, UF reduced to 2.5 L net. SBAR given to Primary nurse.

## 2022-09-06 NOTE — PROGRESS NOTES
[facility] Dosing Services: IV to PO Conversion    The pharmacist has determined that this patient meets P & T approved criteria for conversion from IV to oral therapy for the following medication:Pantoprazole    The pharmacist has written the following order for the patient: Elizabeth Schulz  The pharmacist will continue to monitor the patient's status and advise the physician if conversion back to IV therapy is recommended.     Signed JOHNSON Gaines Contact information:  [phone]

## 2022-09-06 NOTE — PROGRESS NOTES
8.35 am  PT consult received and acknowledged . PT eval attempted. However, pt undergoing dialysis at this time. PT will reattempt for PT eval at a later time. Thanks . 2.27pm  PT eval attempted x 2. Pt politely declined for participation in therapy sec to pain in abdomen-10/10. PT will follow and attempt at a later time.

## 2022-09-06 NOTE — PROGRESS NOTES
Problem: Falls - Risk of  Goal: *Absence of Falls  Description: Document Zayas Reason Fall Risk and appropriate interventions in the flowsheet. Outcome: Progressing Towards Goal  Note: Fall Risk Interventions:  Mobility Interventions: Bed/chair exit alarm, Communicate number of staff needed for ambulation/transfer, Patient to call before getting OOB         Medication Interventions: Patient to call before getting OOB, Teach patient to arise slowly, Bed/chair exit alarm    Elimination Interventions: Bed/chair exit alarm, Call light in reach, Patient to call for help with toileting needs    History of Falls Interventions: Bed/chair exit alarm         Problem: Patient Education: Go to Patient Education Activity  Goal: Patient/Family Education  Outcome: Progressing Towards Goal     Problem: Pressure Injury - Risk of  Goal: *Prevention of pressure injury  Description: Document Randy Scale and appropriate interventions in the flowsheet.   Outcome: Progressing Towards Goal  Note: Pressure Injury Interventions:  Sensory Interventions: Assess changes in LOC, Assess need for specialty bed    Moisture Interventions: Absorbent underpads, Apply protective barrier, creams and emollients    Activity Interventions: Assess need for specialty bed, Increase time out of bed, Pressure redistribution bed/mattress(bed type)    Mobility Interventions: Assess need for specialty bed, HOB 30 degrees or less, Pressure redistribution bed/mattress (bed type)    Nutrition Interventions: Document food/fluid/supplement intake    Friction and Shear Interventions: Apply protective barrier, creams and emollients, HOB 30 degrees or less, Lift sheet

## 2022-09-06 NOTE — PROGRESS NOTES
Occupational Therapy:  09/06/22    Orders received and appreciated, chart reviewed. Pt currently undergoing HD at bedside, likely to finish within the hour. Will defer and follow up this afternoon for OT evaluation.      Thank you,  Sohan Mix, OTR/L

## 2022-09-06 NOTE — PROGRESS NOTES
[facility]  [address] [facility] [address]   Office [phone]  Fax [phone]          Subjective / Objective     Subjective  NAEO    Patient seen and examined at bedside. She says her abdominal pain, headache, and CP are unchanged from yesterday. N/v still improved, as yesterday. Still does not want any pain medication other than tylenol. Respiratory: O2 Device: Nasal cannula 2L, sating 100%  Visit Vitals  BP (!) 151/72   Pulse 87   Temp 98.5 °F (36.9 °C)   Resp 18   Ht 5' 10\" (1.778 m)   Wt 265 lb 3.4 oz (120.3 kg)   SpO2 100%   BMI 38.05 kg/m²     Physical Examination:   General appearance - alert, in no acute distress. .  Chest - clear to auscultation, no wheezes, rales or rhonchi, symmetric air entry. Heart - normal rate, regular rhythm, normal S1, S2, no murmurs, rubs, clicks or gallops  Abdomen - soft, nondistended, no masses or organomegaly. No guarding. TTP in epigastric region. Neurological - alert, oriented, normal speech, no focal findings  Skin - warm, dry. No notable rashes  Extremities - No clubbing or cyanosis. Moving all extremities spontaneously.   Psychiatric - normal speech and thought processes    I/O:  09/05 0701 - 09/06 0700  In: 720 [P.O.:720]  Out: -   Inpatient Medications  Current Facility-Administered Medications   Medication    mylanta/viscous lidocaine (GI COCKTAIL)    erythromycin lactobionate (ERYTHROCIN) 350 mg in 0.9% sodium chloride 250 mL IVPB    epoetin charlie-epbx (RETACRIT) injection 10,000 Units    bisacodyL (DULCOLAX) tablet 5 mg    polyethylene glycol (MIRALAX) packet 17 g    senna-docusate (PERICOLACE) 8.6-50 mg per tablet 1 Tablet    pantoprazole (PROTONIX) 40 mg in 0.9% sodium chloride 10 mL injection    Warfarin Pharmacy to dose    ondansetron (ZOFRAN) injection 8 mg    gentamicin (GARAMYCIN) 0.1 % ointment    gentamicin (GARAMYCIN) 0.1 % cream    nitroglycerin (NITROSTAT) tablet 0.4 mg    sodium chloride (NS) flush 5-40 mL    sodium chloride (NS) flush 5-40 mL    acetaminophen (TYLENOL) tablet 650 mg    Or    acetaminophen (TYLENOL) suppository 650 mg    albuterol-ipratropium (DUO-NEB) 2.5 MG-0.5 MG/3 ML    allopurinoL (ZYLOPRIM) tablet 100 mg    atorvastatin (LIPITOR) tablet 80 mg    calcitRIOL (ROCALTROL) capsule 0.5 mcg    folic acid (FOLVITE) tablet 1 mg    midodrine (PROAMATINE) tablet 2.5 mg    potassium chloride SR (KLOR-CON 10) tablet 20 mEq    sucralfate (CARAFATE) tablet 1 g     Current Facility-Administered Medications   Medication Dose Route Frequency    mylanta/viscous lidocaine (GI COCKTAIL)  40 mL Oral ONCE    erythromycin lactobionate (ERYTHROCIN) 350 mg in 0.9% sodium chloride 250 mL IVPB  350 mg IntraVENous Q8H    epoetin charlie-epbx (RETACRIT) injection 10,000 Units  10,000 Units IntraVENous DIALYSIS TUE, THU & SAT    bisacodyL (DULCOLAX) tablet 5 mg  5 mg Oral Q12H    polyethylene glycol (MIRALAX) packet 17 g  17 g Oral Q12H    senna-docusate (PERICOLACE) 8.6-50 mg per tablet 1 Tablet  1 Tablet Oral Q12H    pantoprazole (PROTONIX) 40 mg in 0.9% sodium chloride 10 mL injection  40 mg IntraVENous Q12H    Warfarin Pharmacy to dose   Other Rx Dosing/Monitoring    ondansetron (ZOFRAN) injection 8 mg  8 mg IntraVENous Q6H    gentamicin (GARAMYCIN) 0.1 % ointment   Topical DAILY    gentamicin (GARAMYCIN) 0.1 % cream   Topical DIALYSIS PRN    nitroglycerin (NITROSTAT) tablet 0.4 mg  0.4 mg SubLINGual Q5MIN PRN    sodium chloride (NS) flush 5-40 mL  5-40 mL IntraVENous Q8H    sodium chloride (NS) flush 5-40 mL  5-40 mL IntraVENous PRN    acetaminophen (TYLENOL) tablet 650 mg  650 mg Oral Q6H PRN    Or    acetaminophen (TYLENOL) suppository 650 mg  650 mg Rectal Q6H PRN    albuterol-ipratropium (DUO-NEB) 2.5 MG-0.5 MG/3 ML  3 mL Nebulization Q6H PRN    allopurinoL (ZYLOPRIM) tablet 100 mg  100 mg Oral DAILY    atorvastatin (LIPITOR) tablet 80 mg  80 mg Oral QHS    calcitRIOL (ROCALTROL) capsule 0.5 mcg  0.5 mcg Oral DAILY    folic acid (FOLVITE) tablet 1 mg  1 mg Oral DAILY    midodrine (PROAMATINE) tablet 2.5 mg  2.5 mg Oral BID    potassium chloride SR (KLOR-CON 10) tablet 20 mEq  20 mEq Oral DAILY    sucralfate (CARAFATE) tablet 1 g  1 g Oral AC&HS     Allergies  Allergies   Allergen Reactions    Contrast Dye [Iodine] Anaphylaxis     Tolerates when pre medicated w/ IV solumedrol and benadryl prior to procedure     Shellfish Derived Anaphylaxis    Levaquin [Levofloxacin] Nausea and Vomiting    Morphine Hives and Itching    Nsaids (Non-Steroidal Anti-Inflammatory Drug) Nausea and Vomiting     CBC:  Recent Labs     09/06/22  0845 09/05/22 0315 09/04/22  0420   WBC 9.0 10.3 10.4   HGB 6.8* 7.0* 7.0*    684 864     Metabolic Panel:  Recent Labs     09/05/22 0315 09/04/22  0420 09/03/22  1700   * 133*  --    K 3.5 3.5  --    CL 98 98  --    CO2 27 26  --    BUN 50* 49*  --    CREA 10.30* 10.20*  --    GLU 93 98  --    CA 8.8 8.5  --    INR 1.3* 1.3* 2.3*     Imaging/procedures:   CT ABD PELV WO CONT    Result Date: 9/1/2022  1. Increasing moderate volume ascites. Right abdominal peritoneal catheter. 2.  Grossly unchanged right mid renal indeterminate lesion in several additional bilateral likely cystic lesions as described above. 3.  Bilateral nonobstructive nephrolithiasis. 4.  Cholecystectomy. 5.  Evidence of chronic interstitial lung disease. XR CHEST PORT    Result Date: 9/5/2022  1. No interval change in bilateral interstitial and alveolar opacities           Assessment and Plan     Ed Moulton is a 61 y.o. female with PMH ESRD on PD, CAD with MI 2012, HFpEF, ILD on 2L home O2, JASEN, GERD, h/o DVT in RLE on warfarin, HTN, HLD, admitted with intractable n/v and epigastric pain. Intractable nausea and vomiting with epigastric abd pain: CT AP showed increasing moderate volume ascites, unchanged b/l nonobstructive nephrolithiasis. Possibly 2/2 gastroparesis vs CHF vs uremia. Peritoneal fluid cx no growth.  GI no need for endoscopy. -Zofran 8mg q6h  -Protonix  -GI consulted, appreciate recommendations  -Nephrology consulted, appreciate recommendations  -Miralax daily   -Erythromycin  -GI cocktail    Anemia: Hb 6.8 9/6 (BL 10), MCV 99. Symptomatic with CP, heart fluttering.  - Monitor for symptoms, signs of bleeding  - Consented for blood products   - 1u pRBC ordered  - 2 hr H/H  - ALDO per nephro     Chronic hypokalemia: Baseline 3.3. On KCl 20 meq daily at home. Likely 2/2 continued n/v. EKG wnl.  -Continue home KCl 20 meq daily   -Monitor BMP    Supratherapeutic INR on warfarin: (Resolved) INR 13.5 on 9/3, no longer elevated. -Home warfarin, pharmacy to dose  -PT/INR daily     Hypotension in the setting of HTN: recently taken off of home carvedilol 6.25 mg and Imdur 120 mg CR by her PCP due to low BPs. Also started on midodrine 2.5 mg BID. -Hold home carvedilol 6.25 mg and Imdur 120 mg CR  -Continue midodrine 2.5 mg BID  -Will continue to monitor at this time and readjust as BP's trend. ESRD on Dialysis:  Follows with Dr Gila Bryan (nephrology). -Consult nephrology, appreciate recommendations  - Per pt preference, switched from PD to HD, TTS  -Avoid Nephrotoxins  -Renally dose medications  -Continue Calcitriol 0.5mg daily     Coronary artery disease with history of MI: 1 SAL to OM 2004, MI in 2012 s/p 2 SAL to OM2/OM3. Has chronic proximal occlusion with L to R collaterals. Follows with Júnior Lee (cardiologist). Echo with EF 60-65%. Worsening CP and tachycardia 9/5, but EKG, trop, CXR wnl.  - Remote tele  - Cont home lipitor 80mg QHS     Chronic HFpEF: Per chart review last echo in 2019 (EF 66-70%). Pt reports recent admission to outside hospital for diuresis. Echo EF 60-65%. -Monitor for signs/symptoms of HF     Interstitial lung disease: With chronic hypoxia on 2 L oxygen via nasal cannula at home. Biopsy negative for sarcoid per patient. Attributed to smoking history.    - Duonebs prn  - Humidified air  - Home 2L O2 Chronic sacral ulcer: painful.   -Consult inpatient wound care     T2DM with CKD: Last HgA1C 4.7 (3/25/22). Not on any medications currently. - Monitor on daily labs     GERD Stable. -Continue home sucralfate   -Protonix 40 IV daily     Hx of DVT: Of right leg. No recurrence. Home warfarin 1 mg MWF with 2.5 mg Tue Thur Sat.   -Home warfarin, pharmacy to dose  -PT/INR daily     History of gout: Takes allopurinol 100 mg daily. Has not had a recent flare.   -Continue home allopurinol 100 mg daily     Intermittent constipation: Takes docusate sodium daily. -MiraLAX PRN     Hyperlipidemia: Total 100, TG 78, HDL 51, LDL 33.4.   -Continue home Lipitor 80 mg nightly    Obstructive sleep apnea: Uses CPAP nightly, well-controlled at this time. Former smoker: 0.5 PPD for 41 years for 20.5-pack-year history. Quit on 11/9/2014. Obesity: PT with BMI 38.05 kg/m². - Encouraging lifestyle modifications and further follow up outpatient.     Patient discussed with Ramakrishna Bernal MD  [facility] Medicine Resident       For Billing    Chief Complaint   Patient presents with    Nausea    Vomiting    Fatigue       Hospital Problems  Date Reviewed: 9/2/2022            Codes Class Noted POA    * (Principal) Intractable vomiting with nausea ICD-10-CM: R11.2  ICD-9-CM: 536.2  9/2/2022 Yes        Hypokalemia ICD-10-CM: E87.6  ICD-9-CM: 276.8  7/23/2022 Yes        Supratherapeutic INR ICD-10-CM: R79.1  ICD-9-CM: 790.92  9/2/2022 Yes        Hyponatremia ICD-10-CM: E87.1  ICD-9-CM: 276.1  9/2/2022 Yes        Abdominal pain ICD-10-CM: R10.9  ICD-9-CM: 789.00  7/23/2022 Yes        (HFpEF) heart failure with preserved ejection fraction (HCC) ICD-10-CM: I50.30  ICD-9-CM: 428.9  9/23/2018 Yes        ESRD on peritoneal dialysis [facility]) ICD-10-CM: N18.6, Z99.2  ICD-9-CM: 585.6, V45.11  6/23/2018 Yes        Hx of deep venous thrombosis ICD-10-CM: Z86.718  ICD-9-CM: V12.51  5/30/2018 Yes        Diabetic gastroparesis ([facility] ICD-10-CM: E11.43, K31.84  ICD-9-CM: 250.60, 536.3  Unknown Yes        GERD (gastroesophageal reflux disease) ICD-10-CM: K21.9  ICD-9-CM: 530.81  Unknown Yes        DM type 2 causing neurological disease ([facility] ICD-10-CM: E11.49  ICD-9-CM: 250.60  Unknown Yes        Gout ICD-10-CM: M10.9  ICD-9-CM: 274.9  Unknown Yes        ILD (interstitial lung disease) ([facility] ICD-10-CM: J84.9  ICD-9-CM: 543  8/20/2017 Yes        Warfarin anticoagulation ICD-10-CM: Z79.01  ICD-9-CM: V58.61  8/20/2017 Yes        HTN (hypertension) (Chronic) ICD-10-CM: I10  ICD-9-CM: 401.9  10/1/2012 Yes        Coronary artery disease (Chronic) ICD-10-CM: I25.10  ICD-9-CM: 414.00  2/16/2011 Yes    Overview Addendum 10/5/2012  7:33 AM by Matt Knenedy, [address] 2004  1v CAD by cath - PCI OM with SAL  Cath 5/2004 - patent stent, no new disease  Lexiscan cardiolite 12/09- normal perfusion, EF 66%  Cath: 3/19/12: PA 55/30 mean 41, wedge 26, RA 24, EDP 35, LVG 55%, LM normal, LAD normal, LCX - OM1 patent stent, OM2 prox 85% long, % with L to R collaterals                JASEN on CPAP (Chronic) ICD-10-CM: G47.33, Z99.89  ICD-9-CM: 327.23, V46.8  2/16/2011 Yes    Overview Signed 3/21/2012  8:04 AM by Reno Del Castillo CPAP

## 2022-09-06 NOTE — PROGRESS NOTES
Occupational Therapy:  09/06/22    0916: Orders received and appreciated chart reviewed. Pt currently receiving HD at bedside and unavailable to participate. Will follow up this afternoon. 1421: Followed up this afternoon for OT farhana. Pt received sitting up in bed,  at bedside. Pt reporting severe 10/10 abdominal pain, requesting therapy defer. Offered to discuss pain with RN, however pt and  adamantly declining any pain medications. Will continue to follow.      Thank you,  Sohan Mix, OTR/L

## 2022-09-06 NOTE — DISCHARGE SUMMARY
Discharge / Transfer / Off-Service Note     Name: Alexandr Bellamy MRN: [id number]  Sex: Female   YOB: 1955  Age: 61 y.o. PCP: Lobito Gallegos DO     Date of admission: 9/1/2022  Date of discharge/transfer: 9/10/2022    Attending physician at admission: Gwen Andrews. Attending physician at discharge/transfer: Zaida Cortes MD  Resident physician at discharge/transfer: Luke Norman MD     Consultants during hospitalization  IP CONSULT TO [place] [facility] TO CARDIOLOGY     Admission diagnoses   Abdominal pain [R10.9]    Recommended follow-up after discharge    1. PCP-Hallie Levine DO  2. Cardiology - Dr. Joanna Head  3. GI     Things to follow up on with PCP:   - Assess for depression/anxiety related to chronic health problems  - Monitor Hb with CBC recheck  - PT/INR, warfarin management  - Monitor K with BMP recheck, adjust home Kcl as needed  - Monitor BP and adjust home meds as needed  - Monitor GERD, constipation and adjust home meds as needed  - Pending B1, B6     Things to follow up on with GI:  - Gastroparesis, erythromycin management  - Possible esophageal dilation  - Possible colonoscopy    Things to follow up on with Cardiology:  - Possible switch from warfarin to [id number]  - HF, BP, and related medications    Current Discharge Medication List        START taking these medications    Details   pantoprazole (PROTONIX) 40 mg tablet Take 1 Tablet by mouth Before breakfast and dinner. Indications: gastritis  Qty: 180 Tablet, Refills: 0  Start date: 9/8/2022      erythromycin [facility] [place]) 250 mg capsule Take 1 Capsule by mouth Before breakfast, lunch, and dinner. Indications: stomach muscle paralysis and decreased function  Qty: 90 Capsule, Refills: 0  Start date: 9/8/2022      polyethylene glycol (MIRALAX) 17 gram packet Take 1 Packet by mouth daily as needed for Constipation.   Start date: 9/7/2022           CONTINUE these medications which have CHANGED    Details   warfarin (COUMADIN) 1 mg tablet Take 1 Tablet by mouth once for 1 dose. Take 1mg daily until PT/INR re-evaluated  Qty: 30 Tablet, Refills: 0  Start date: 9/10/2022, End date: 9/10/2022      carvediloL (COREG) 3.125 mg tablet Take 1 Tablet by mouth two (2) times daily (with meals). Qty: 60 Tablet, Refills: 1  Start date: 9/9/2022      isosorbide mononitrate ER (IMDUR) 30 mg tablet Take 1 Tablet by mouth daily. Qty: 30 Tablet, Refills: 1  Start date: 9/9/2022           CONTINUE these medications which have NOT CHANGED    Details   midodrine (PROAMATINE) 2.5 mg tablet Take 1 Tablet by mouth two (2) times a day for 30 days. Qty: 60 Tablet, Refills: 0    Associated Diagnoses: Hypotension due to hypovolemia      nitroglycerin (NITROSTAT) 0.4 mg SL tablet 1 Tablet by SubLINGual route every five (5) minutes as needed for Chest Pain. Up to 3 doses. Qty: 1 Each, Refills: 1      triamcinolone acetonide (KENALOG) 0.1 % ointment Apply  to affected area two (2) times a day. use thin layer  Qty: 30 g, Refills: 1    Associated Diagnoses: Dermatitis      folic acid (FOLVITE) 1 mg tablet Take 1 Tablet by mouth in the morning. Qty: 30 Tablet, Refills: 0      potassium chloride (K-DUR, KLOR-CON M20) 20 mEq tablet Take 1 Tablet by mouth in the morning. Qty: 30 Tablet, Refills: 0      ondansetron (ZOFRAN ODT) 4 mg disintegrating tablet Take 1 Tablet by mouth every eight (8) hours as needed for Nausea or Vomiting. Qty: 30 Tablet, Refills: 0      atorvastatin (LIPITOR) 80 mg tablet TAKE 1 TABLET BY MOUTH ONCE DAILY NIGHTLY  Qty: 90 Tablet, Refills: 3    Associated Diagnoses: Atherosclerosis of native coronary artery of native heart with stable angina pectoris ([address]  Coronary artery disease of native artery of native heart with stable angina pectoris (HCC)      allopurinoL (ZYLOPRIM) 100 mg tablet Take 1 tablet by mouth once daily  Qty: 90 Tablet, Refills: 3    Associated Diagnoses: Acute gout due to renal impairment involving left foot CALCITRIOL PO Take 0.5 mg by mouth.      sucralfate (CARAFATE) 1 gram tablet Take 1 tablet by mouth 4 times daily  Qty: 120 Tablet, Refills: 5      albuterol (PROAIR HFA) 90 mcg/actuation inhaler Take 2 Puffs by inhalation every four (4) hours as needed for Wheezing. Qty: 1 Inhaler, Refills: 5      Oxygen O2 2L NC 24/7           STOP taking these medications       clotrimazole (MYCELEX) 10 mg kevin Comments:   Reason for Stopping:         docusate sodium (STOOL SOFTENER PO) Comments:   Reason for Stopping:                 History of Present Illness    As per admitting provider:     Ethel Santiago is a 61 y.o. female with PMHx CAD s/p MI (2012) s/p 2 SAL, HFpEF, T2DM, ESRD on PD, GERD, HTN, JASEN on CPAP, pHTN, DVT of RLE (2019) on Warfarin, Gout, ILD 2/2, former smoker with chronic hypoxia on 2L O2 who presents to the ER complaining of: Nausea, vomiting, dizziness, lightheadedness for \"several weeks\". She has not been able to keep down food or water recently. She was put on Zofran previously but has not been able to keep this down either, per pt. Vomit consists of liquid as well as other stomach contents. Nonbloody. Patient denies any fever, chills, or diarrhea. Patient endorses decreased appetite, fatigue as well as epigastric abdominal pain for several weeks. Patient is on daily PD; has not done PD yet for 9/1. Of Note: Patient was admitted at [facility] for approximately 2 weeks for congestive heart failure exasperation. Hemodialysis and PD was done at the time. Per patient, over 40 pounds of fluid was taken off. Hospital course    Ethel Santiago is a 61 y.o. female with PMH ESRD on PD, CAD with MI 2012, HFpEF, ILD on 2L home O2, JASEN, GERD, h/o DVT in RLE on warfarin, HTN, HLD, admitted with intractable n/v and epigastric pain, thought to be 2/2 gastroparesis vs CHF vs side effect of PD. Improved with medications and switch to HD.  Patient had multiple rapid responses during hospital stay, workup was negative. Required 1u pRBC for Hb of 6.8, after with Hb improved. Intractable nausea and vomiting with epigastric abd pain: Improved. CT AP showed increasing moderate volume ascites, unchanged b/l nonobstructive nephrolithiasis. Possibly 2/2 gastroparesis vs CHF vs uremia. Peritoneal fluid cx no growth. GI consulted, said no need for scope. Received zofran, protonix, carafate, miralax, GI cocktail. Started on erythromycin for gastroparesis. Patient reports improvement of abdominal pain on discharge. Copper, zinc wnl. -GI follow-up outpatient  -Continue zofran, protonix, carafate, erythromycin  -Follow-up pending B1, B6 levels     Anemia: Hb 6.8 9/6 (BL 10), MCV 99. Symptomatic with CP, heart fluttering. Improved s/p 1u pRBC on 9/6. ALDO given per nephrology. Patient endorsed dark tarry stools x1day, FOBT neg.  - Monitor Hb outpatient, recheck CBC  - Follow-up with GI for possible scope     Chronic hypokalemia: Baseline 3.3. On KCl 20 meq daily at home. Likely 2/2 continued n/v. EKG wnl.   -Continue home KCl 20 meq daily   -Monitor BMP outpatient     Supratherapeutic INR on warfarin: (Resolved) INR 13.5 on 9/3, no longer elevated. Warfarin dosed per pharmacy. PT/INR monitored daily.  -Per pharmacy, pt take 1mg warfarin until PT/INR re-evaluated  -Monitor PT/INR closely outpatient  -Consider switching to DOAC     Hypotension in the setting of HTN: recently taken off of home carvedilol 6.25 mg BID and Imdur 60mg CR by her PCP due to low Bps, started on midodrine 2.5 mg BID. Midodrine 2.5 mg BID, Imdur 30mg daily, coreg 3.125mg BID per cards.  -Continue carvedilol 3.125mg BID, Imdur 30mg, midodrine 2.5mg BID  -Monitor BP outpatient and adjust as needed     ESRD on Dialysis:  Follows with Dr Joyce Galicia (nephrology).  Received PD inpatient, then switched to HD per patient request.  -Follow-up outpatient with nephrology  -Continue HD outpatient  -Continue home Calcitriol 0.5mg daily Coronary artery disease with history of MI: 1 SAL to OM 2004, MI in 2012 s/p 2 SAL to OM2/OM3. Has chronic proximal occlusion with L to R collaterals. Follows with Birdie Painter (cardiologist). Echo with EF 60-65%. Worsening CP and tachycardia 9/5, but EKG, trop, CXR wnl. Rapid for CP 9/8, EKG sinus tach. Restarted imdur at 30mg, coreg at 3.125mg BID per cardiology. - Follow-up outpatient with Dr. Birdie Painter  - Cont home lipitor 80mg QHS, imdur 30mg daily, coreg 3.125mg BID     Chronic HFpEF: Per chart review last echo in 2019 (EF 66-70%). Pt reports recent admission to outside hospital for diuresis. Echo EF 60-65%. -Monitor for signs/symptoms of HF  -Follow-up outpatient with Dr. Birdie Painter     Interstitial lung disease: With chronic hypoxia on 2 L O2 at home. Biopsy negative for sarcoid per patient, attributed to smoking hx. Given duonebs, home 2L O2, humidified air.  - Continue home albulterol  - Home 2L O2     Chronic sacral ulcer: Inpatient wound care. -Follow-up outpatient for wound care     T2DM with CKD: Last HgA1C 4.7 (3/25/22). Not on any medications currently, well-controlled. -Continue to monitor outpatient     GERD Stable on home sucralfate, started on protonix.   -Continue home sucralfate   -Continue protonix  -Follow-up outpatient with GI     Hx of DVT: Of right leg, no recurrence. Was on home warfarin 1 mg MWF with 2.5 mg Tue Thur Sat.   -Per pharmacy, pt take 1mg warfarin until PT/INR re-evaluated  -Monitor PT/INR closely outpatient  -Consider switching to DOAC     History of gout: Well-controlled on home allopurinol 100 mg daily. Has not had a recent flare.   -Continue home allopurinol 100 mg daily     Intermittent constipation: Given miralax, senna-docusate.    -Continue miralax prn  -Follow-up outpatient with GI     Hyperlipidemia: Stable on home lipitor 80mg QHS.   -Continue home Lipitor 80 mg QHS     Obstructive sleep apnea: Uses CPAP nightly, well-controlled at this time.   -Continue home CPAP Former smoker: 0.5 PPD for 41 years for 20.5-pack-year history. Quit on 11/9/2014. Obesity: BMI 38.05 kg/m². - Outpatient encouragement of lifestyle modifications      Physical exam at discharge:    Vitals Reviewed. Patient Vitals for the past 12 hrs:   Temp Pulse Resp BP SpO2   09/10/22 0920 98.3 °F (36.8 °C) (!) 106 20 129/62 97 %   09/10/22 0900 -- (!) 114 19 (!) 112/58 --   09/10/22 0845 -- (!) 108 20 139/65 --   09/10/22 0830 -- (!) 119 19 (!) 136/59 --   09/10/22 0815 -- (!) 117 20 (!) 95/58 --   09/10/22 0800 -- (!) 109 18 111/64 --   09/10/22 0745 -- (!) 111 20 (!) 165/104 --   09/10/22 0730 -- (!) 101 19 121/65 --   09/10/22 0715 -- 99 17 (!) 165/103 --   09/10/22 0700 -- 96 20 139/62 --   09/10/22 0645 -- 91 19 (!) 130/95 --   09/10/22 0630 -- 85 20 (!) 142/93 --   09/10/22 0615 -- 77 18 (!) 140/59 --   09/10/22 0600 -- 84 19 136/64 --   09/10/22 0550 -- 77 20 (!) 147/68 --   09/10/22 0545 98.3 °F (36.8 °C) 66 19 (!) 150/48 98 %   09/10/22 0437 98.3 °F (36.8 °C) 87 16 138/71 98 %   09/09/22 2329 98.4 °F (36.9 °C) 93 16 127/75 97 %      General: No acute distress. Alert. Cooperative. Head: Normocephalic. Atraumatic. Mouth: Mucosa pink. Moist mucous membranes. Respiratory: CTAB. No w/r/r/c.  Cardiovascular: RRR. No m/r/g.   GI: Nondistended.+ bowel sounds. No rebound tenderness or guarding. Epigastric mild TTP. Extremities: LE edema. Moving all extremities spontaneously. Musculoskeletal: Full ROM in all extremities, no obvious deformities. Skin: Warm, dry. No rashes. Neuro: Alert, normal behavior. No focal deficit. Telemetry: sinus tachycardia up to 120s with few PCVs.    Condition at discharge: Stable.     Labs  Recent Labs     09/10/22  0445 09/09/22  0552 09/08/22  0331   WBC 8.2 8.8 11.8*   HGB 7.3* 7.6* 8.2*   HCT 23.7* 24.1* 25.6*    216 237     Recent Labs     09/10/22  0445 09/09/22  0552 09/08/22  1412 09/08/22  0331    138 138 136   K 3.3* 3.8 3.9 3.5    103 102 103   CO2 25 27 31 25   BUN 21* 18 15 33*   CREA 7.65* 6.27* 5.29* 8.74*   GLU 87 81 113* 91   CA 9.0 9.0 9.4 9.2  9.3   MG  --   --  1.6 1.6   PHOS  --   --   --  2.6     Recent Labs     09/10/22  0445 09/09/22  0552 09/08/22  0331   ALT 13 14 15   AP 94 94 99   TBILI 0.4 0.4 0.5   TP 5.2* 5.3* 5.6*   ALB 2.2* 2.0* 2.3*   GLOB 3.0 3.3 3.3   LPSE  --   --  391     Recent Labs     09/10/22  0445 09/09/22  0552 09/08/22  0331   INR 2.6* 3.2* 3.8*   PTP 25.6* 30.8* 36.7*       Micro:  Lab Results   Component Value Date/Time    Culture result: NO GROWTH 4 DAYS Culture performed on Unspun Fluid 07/24/2022 07:49 PM    Culture result: NO GROWTH ON SOLID MEDIA AT 14 DAYS 05/24/2019 10:00 AM    Culture result: (A) 05/24/2019 10:00 AM     STAPHYLOCOCCUS EPIDERMIDIS (OXACILLIN RESISTANT) ISOLATED FROM THIO BROTH ONLY    Culture result: (A) 05/24/2019 10:00 AM     PRELIMINARY REPORT OF GRAM POSITIVE COCCI IN CLUSTERS SEEN ON GRAM STAIN OF THE THIO BROTH RESULT CALLED TO AND READ BACK BY DIMAS EVANS ON 5/26/19 AT 1155. AOW    Culture result: Culture performed on Unspun Fluid 05/24/2019 10:00 AM    Culture result: NO ANAEROBES ISOLATED 05/24/2019 10:00 AM       Imaging:  XR CHEST PA LAT    Result Date: 8/8/2022  Indication:  Chest Pain Exam: PA and lateral views of the chest. Direct comparison is made to prior CXR dated 2/2019. Findings: Cardiomediastinal silhouette is stable. There is persistent bilateral multifocal interstitial and patchy air space disease. There is no pleural fluid. There is no pneumothorax. Persistent bilateral multifocal interstitial and patchy airspace disease. CT ABD PELV WO CONT    Result Date: 9/1/2022  EXAM: CT ABD PELV WO CONT INDICATION: epigastric pain, elevated lipase COMPARISON: CT abdomen and pelvis 7/23/2022 IV CONTRAST: None. ORAL CONTRAST: None TECHNIQUE: Thin axial images were obtained through the abdomen and pelvis. Coronal and sagittal reformats were generated.  CT dose reduction was achieved through use of a standardized protocol tailored for this examination and automatic exposure control for dose modulation. The absence of intravenous contrast material reduces the sensitivity for evaluation of the vasculature and solid organs. FINDINGS: LOWER THORAX: Coronary artery calcifications. Redemonstrated bronchiectasis, scarring, and patchy groundglass in the lung bases, likely chronic interstitial lung disease LIVER: No definite mass. Calcified granulomas. BILIARY TREE: Cholecystectomy. CBD is not dilated. SPLEEN: Calcified granulomas. PANCREAS: No focal abnormality. ADRENALS: Unremarkable. KIDNEYS/URETERS: No hydronephrosis bilaterally. Multiple bilateral nodular calculi again noted. Stable 5.9 cm x 5.5 cm indeterminate soft tissue density lesion in the mid right kidney. Several stable fluid density lesions bilaterally and high density lesions in the left kidney may reflect simple and hemorrhagic/proteinaceous cysts, respectively. STOMACH: Unremarkable. SMALL BOWEL: No dilatation or wall thickening. COLON: No dilatation. Rectal wall thickening with prominent surrounding fat stranding. APPENDIX: Not discretely identified. PERITONEUM: Right ventral approach catheter terminates in the right paracolic gutter. Increasing Moderate ascites. No pneumoperitoneum. RETROPERITONEUM: No lymphadenopathy or aortic aneurysm. Atherosclerosis. REPRODUCTIVE ORGANS: Hysterectomy. URINARY BLADDER: No mass or calculus. BONES: No destructive bone lesion. Degenerative changes. ABDOMINAL WALL: No mass or hernia. ADDITIONAL COMMENTS: N/A     1. Increasing moderate volume ascites. Right abdominal peritoneal catheter. 2.  Grossly unchanged right mid renal indeterminate lesion in several additional bilateral likely cystic lesions as described above. 3.  Bilateral nonobstructive nephrolithiasis. 4.  Cholecystectomy. 5.  Evidence of chronic interstitial lung disease.     XR CHEST PORT    Result Date: 9/5/2022  INDICATION:  chest pain EXAM: Portable chest 0915 . Comparison 8/4/2022. FINDINGS: There is no significant change in appearance the lungs. Bilateral interstitial and alveolar opacities not significantly changed in the interval. No new consolidation. Cardiomediastinal silhouette is unchanged. No pneumothorax. 1. No interval change in bilateral interstitial and alveolar opacities     ECHO ADULT COMPLETE    Result Date: 9/2/2022  Formatting of this result is different from the original.   Left Ventricle: Normal left ventricular systolic function with a visually estimated EF of 60 - 65%. Left ventricle size is normal. Normal wall thickness. Normal wall motion. Diastolic dysfunction present with normal LV EF. Aortic Valve: Valve structure is normal. Mildly calcified cusp. Mild sclerosis of the aortic valve cusp. Mitral Valve: Valve structure is normal. Moderate annular calcification of the mitral valve. Aorta: Normal sized aortic root and ascending aorta. Mildly dilated sinus of Valsalva 3.8cm. Technical qualifiers: Echo study was limited due to patient's condition and limited due to patient tolerance. DUPLEX UPPER EXT VENOUS LEFT    Result Date: 8/8/2022  · No evidence of deep vein thrombosis in the right upper extremity. DUPLEX LOWER EXT VENOUS RIGHT    Result Date: 8/8/2022  · No evidence of deep vein thrombosis in the right lower extremity.         Procedures / Diagnostic Studies  1u pRBC transfusion 9/7    Chronic diagnoses   Problem List as of 9/10/2022 Date Reviewed: 9/2/2022            Codes Class Noted - Resolved    * (Principal) Intractable vomiting with nausea ICD-10-CM: R11.2  ICD-9-CM: 536.2  9/2/2022 - Present        Hypokalemia ICD-10-CM: E87.6  ICD-9-CM: 276.8  7/23/2022 - Present        Supratherapeutic INR ICD-10-CM: R79.1  ICD-9-CM: 790.92  9/2/2022 - Present        Hyponatremia ICD-10-CM: E87.1  ICD-9-CM: 276.1  9/2/2022 - Present        Chest pain ICD-10-CM: R07.9  ICD-9-CM: 786.50  7/23/2022 - Present        Abdominal pain ICD-10-CM: R10.9  ICD-9-CM: 789.00  7/23/2022 - Present        Ascites ICD-10-CM: R18.8  ICD-9-CM: 789.59  7/23/2022 - Present        Coronary stent patent ICD-10-CM: Z95.5  ICD-9-CM: V45.82  3/17/2020 - Present        Edema of both lower extremities ICD-10-CM: R60.0  ICD-9-CM: 782.3  3/17/2020 - Present        S/P ORIF (open reduction internal fixation) fracture ICD-10-CM: Z98.890, Z87.81  ICD-9-CM: V45.89, V15.51  5/24/2019 - Present        Nephrolithiasis ICD-10-CM: N20.0  ICD-9-CM: 592.0  3/20/2019 - Present        PVC (premature ventricular contraction) ICD-10-CM: I49.3  ICD-9-CM: 427.69  3/20/2019 - Present        (HFpEF) heart failure with preserved ejection fraction ([facility] [address] ICD-10-CM: I50.30  ICD-9-CM: 428.9  9/23/2018 - Present        ESRD on peritoneal dialysis [facility]) ICD-10-CM: N18.6, Z99.2  ICD-9-CM: 585.6, V45.11  6/23/2018 - Present        Limited mobility ICD-10-CM: Z74.09  ICD-9-CM: V49.89  6/21/2018 - Present        Hx of deep venous thrombosis ICD-10-CM: Z86.718  ICD-9-CM: V12.51  5/30/2018 - Present        Diabetic gastroparesis (HCC) ICD-10-CM: E11.43, K31.84  ICD-9-CM: 250.60, 536.3  Unknown - Present        GERD (gastroesophageal reflux disease) ICD-10-CM: K21.9  ICD-9-CM: 530.81  Unknown - Present        DM type 2 causing neurological disease (HCC) ICD-10-CM: E11.49  ICD-9-CM: 250.60  Unknown - Present        DM type 2 causing vascular disease (HCC) ICD-10-CM: E11.59  ICD-9-CM: 250.70, 443.81  Unknown - Present        Gout ICD-10-CM: M10.9  ICD-9-CM: 274.9  Unknown - Present        S/P left pulmonary artery pressure sensor implant placement ICD-10-CM: Z95.9  ICD-9-CM: V45.89  8/31/2017 - Present    Overview Signed 8/31/2017  9:53 AM by Randall Ridley RN     [employer]              ILD (interstitial lung disease) [facility]) ICD-10-CM: B40.1  ICD-9-CM: 956  8/20/2017 - Present        Warfarin anticoagulation ICD-10-CM: Z79.01  ICD-9-CM: V58.61  8/20/2017 - Present        DM (diabetes mellitus), type 2 with renal complications (HCC) (Chronic) ICD-10-CM: E11.29  ICD-9-CM: 250.40  8/9/2013 - Present        Normocytic anemia (Chronic) ICD-10-CM: D64.9  ICD-9-CM: 285.9  5/26/2013 - Present        HTN (hypertension) (Chronic) ICD-10-CM: I10  ICD-9-CM: 401.9  10/1/2012 - Present        Morbid obesity (Chronic) ICD-10-CM: E66.01  ICD-9-CM: 278.01  10/1/2012 - Present        Pulmonary hypertension (HCC) (Chronic) ICD-10-CM: I27.20  ICD-9-CM: 416.8  3/21/2012 - Present        Coronary artery disease (Chronic) ICD-10-CM: I25.10  ICD-9-CM: 414.00  2/16/2011 - Present    Overview Addendum 10/5/2012  7:33 AM by Art Arrieta, [address] 2004  1v CAD by cath - PCI OM with SAL  Cath 5/2004 - patent stent, no new disease  Lexiscan cardiolite 12/09- normal perfusion, EF 66%  Cath: 3/19/12: PA 55/30 mean 41, wedge 26, RA 24, EDP 35, LVG 55%, LM normal, LAD normal, LCX - OM1 patent stent, OM2 prox 85% long, % with L to R collaterals                JASEN on CPAP (Chronic) ICD-10-CM: G47.33, Z99.89  ICD-9-CM: 327.23, V46.8  2/16/2011 - Present    Overview Signed 3/21/2012  8:04 AM by Bette Brown CPAP             Sleep apnea ICD-10-CM: G47.30  ICD-9-CM: 780.57  2/16/2011 - Present        RESOLVED: Arthralgia ICD-10-CM: M25.50  ICD-9-CM: 719.40  3/17/2020 - 6/30/2022        RESOLVED: Fatigue ICD-10-CM: R53.83  ICD-9-CM: 780.79  3/17/2020 - 6/30/2022        RESOLVED: Hematuria ICD-10-CM: R31.9  ICD-9-CM: 599.70  3/17/2020 - 6/30/2022        RESOLVED: Hypervolemia ICD-10-CM: E87.70  ICD-9-CM: 276.69  3/17/2020 - 6/30/2022        RESOLVED: Infected dental caries ICD-10-CM: K02.9, K04.7  ICD-9-CM: 521.09  3/17/2020 - 11/10/2020        RESOLVED: Loss of appetite ICD-10-CM: R63.0  ICD-9-CM: 783.0  3/17/2020 - 11/10/2020        RESOLVED: End stage renal failure on dialysis [facility]) ICD-10-CM: N18.6, Z99.2  ICD-9-CM: 585.6, V45.11  3/17/2020 - 6/30/2022 RESOLVED: Flank pain ICD-10-CM: R10.9  ICD-9-CM: 789.09  3/17/2020 - 6/30/2022        RESOLVED: Hypoxia ICD-10-CM: R09.02  ICD-9-CM: 799.02  3/17/2020 - 6/30/2022        RESOLVED: Nausea and vomiting ICD-10-CM: R11.2  ICD-9-CM: 787.01  3/17/2020 - 11/10/2020        RESOLVED: Infection of total right knee replacement (HCC) ICD-10-CM: T84.53XA  ICD-9-CM: 996.66, V43.65  3/17/2020 - 6/30/2022        RESOLVED: Chronic anticoagulation ICD-10-CM: Z79.01  ICD-9-CM: V58.61  12/13/2019 - 6/30/2022        RESOLVED: ESRD (end stage renal disease) ([facility] ICD-10-CM: N18.6  ICD-9-CM: 585.6  5/28/2019 - 6/30/2022        RESOLVED: Rheumatoid arteritis ([facility] ICD-10-CM: M05.20  ICD-9-CM: 714.2  Unknown - 12/18/2020        RESOLVED: DM type 2 causing renal disease ([facility] ICD-10-CM: E11.29  ICD-9-CM: 250.40  Unknown - 6/30/2022        RESOLVED: COPD (chronic obstructive pulmonary disease) ([facility] ICD-10-CM: J44.9  ICD-9-CM: 496  Unknown - 9/23/2018        RESOLVED: Chest pain ICD-10-CM: R07.9  ICD-9-CM: 786.50  11/21/2017 - 6/15/2018        RESOLVED: Allergic reaction to contrast dye ICD-10-CM: A67.7E4A  ICD-9-CM: 995.27  11/20/2017 - 5/30/2018        RESOLVED: COPD, severe ([facility] ICD-10-CM: J44.9  ICD-9-CM: 911  6/21/2017 - 2/17/2021        RESOLVED: Decreased ambulation status ICD-10-CM: Z74.09  ICD-9-CM: 780.99  6/21/2017 - 9/18/2017        RESOLVED: Acute exacerbation of chronic obstructive pulmonary disease (COPD) ([facility] ICD-10-CM: J44.1  ICD-9-CM: 491.21  3/1/2017 - 8/20/2017        RESOLVED: HCAP (healthcare-associated pneumonia) ICD-10-CM: J18.9  ICD-9-CM: 486  2/28/2017 - 8/20/2017        RESOLVED: CHF exacerbation ([facility] ICD-10-CM: I50.9  ICD-9-CM: 428.0  2/16/2017 - 3/2/2017        RESOLVED: CKD (chronic kidney disease) stage 4, GFR 15-29 ml/min (HCC) (Chronic) ICD-10-CM: N18.4  ICD-9-CM: 585.4  2/10/2017 - 6/23/2018        RESOLVED: Acute and chronic respiratory failure with hypoxia (HCC) ICD-10-CM: J96.21  ICD-9-CM: 518.84, 799.02  2/10/2017 - 8/20/2017        RESOLVED: DVT (deep venous thrombosis) ([facility] ICD-10-CM: I82.409  ICD-9-CM: 453.40  2/2/2017 - 5/30/2018        RESOLVED: Chest pain ICD-10-CM: R07.9  ICD-9-CM: 786.50  11/9/2014 - 11/18/2014        RESOLVED: Wheezing ICD-10-CM: R06.2  ICD-9-CM: 786.07  11/9/2014 - 11/18/2014        RESOLVED: Dyspnea ICD-10-CM: R06.00  ICD-9-CM: 786.09  11/9/2014 - 11/18/2014        RESOLVED: Tobacco abuse ICD-10-CM: Z72.0  ICD-9-CM: 305.1  11/9/2014 - 6/25/2015        RESOLVED: SIRS (systemic inflammatory response syndrome) ([facility] ICD-10-CM: R65.10  ICD-9-CM: 995.90  11/9/2014 - 11/18/2014        RESOLVED: Bacteremia ICD-10-CM: R78.81  ICD-9-CM: 790.7  10/14/2013 - 2/22/2014        RESOLVED: Acute pancreatitis ICD-10-CM: K85.90  ICD-9-CM: 577.0  10/10/2013 - 2/22/2014        RESOLVED: Abdominal pain ICD-10-CM: R10.9  ICD-9-CM: 789.00  10/10/2013 - 2/22/2014        RESOLVED: Pneumonia, organism unspecified(486) ICD-10-CM: J18.9  ICD-9-CM: 486  10/10/2013 - 2/22/2014        RESOLVED: Vitamin D deficiency ICD-10-CM: E55.9  ICD-9-CM: 268.9  8/9/2013 - 5/30/2018        RESOLVED: Angina, class III ([facility] ICD-10-CM: I20.9  ICD-9-CM: 413.9  8/9/2013 - 5/30/2018        RESOLVED: Acute respiratory failure ([facility] ICD-10-CM: J96.00  ICD-9-CM: 518.81  7/12/2013 - 7/17/2013        RESOLVED: Pneumonia, organism unspecified(486) ICD-10-CM: J18.9  ICD-9-CM: 486  7/12/2013 - 7/17/2013        RESOLVED: Chest pain, unspecified ICD-10-CM: R07.9  ICD-9-CM: 786.50  7/12/2013 - 7/17/2013        RESOLVED: DJD (degenerative joint disease) of knee ICD-10-CM: M17.10  ICD-9-CM: 715.36  10/30/2012 - 5/30/2018        RESOLVED: Chronic diastolic heart failure (HCC) (Chronic) ICD-10-CM: I50.32  ICD-9-CM: 428.32  10/5/2012 - 9/23/2018        RESOLVED: Diastolic heart failure ([address] ICD-10-CM: I50.30  ICD-9-CM: 428.30  10/5/2012 - 6/23/2018        RESOLVED: Pneumonia, organism unspecified ICD-10-CM: J18.9  ICD-9-CM: 974  10/1/2012 - 10/30/2012        RESOLVED: JASEN (obstructive sleep apnea) ICD-10-CM: G47.33  ICD-9-CM: 327.23  10/1/2012 - 10/14/2016        RESOLVED: Esophageal reflux ICD-10-CM: K21.9  ICD-9-CM: 530.81  10/1/2012 - 5/24/2019        RESOLVED: Gastroparesis ICD-10-CM: K31.84  ICD-9-CM: 536.3  10/1/2012 - 6/30/2022        RESOLVED: Leukocytosis, unspecified ICD-10-CM: R10.182  ICD-9-CM: 288.60  10/1/2012 - 10/30/2012        RESOLVED: Anemia, unspecified ICD-10-CM: D64.9  ICD-9-CM: 285.9  10/1/2012 - 10/30/2012        RESOLVED: ARF (acute renal failure) ICD-10-CM: N17.9  ICD-9-CM: 584.9  10/1/2012 - 10/30/2012        RESOLVED: Pulmonary edema ICD-10-CM: J81.1  ICD-9-CM: 955  10/1/2012 - 10/30/2012        RESOLVED: Tobacco use disorder (Chronic) ICD-10-CM: F17.200  ICD-9-CM: 305.1  3/21/2012 - 6/25/2015        RESOLVED: Interstitial lung disease ([facility] (Chronic) ICD-10-CM: J84.9  ICD-9-CM: 685  2/28/2011 - 9/18/2017           Current Discharge Medication List        START taking these medications    Details   pantoprazole (PROTONIX) 40 mg tablet Take 1 Tablet by mouth Before breakfast and dinner. Indications: gastritis  Qty: 180 Tablet, Refills: 0  Start date: 9/8/2022      erythromycin [facility] [place]) 250 mg capsule Take 1 Capsule by mouth Before breakfast, lunch, and dinner. Indications: stomach muscle paralysis and decreased function  Qty: 90 Capsule, Refills: 0  Start date: 9/8/2022      polyethylene glycol (MIRALAX) 17 gram packet Take 1 Packet by mouth daily as needed for Constipation. Start date: 9/7/2022           CONTINUE these medications which have CHANGED    Details   warfarin (COUMADIN) 1 mg tablet Take 1 Tablet by mouth once for 1 dose. Take 1mg daily until PT/INR re-evaluated  Qty: 30 Tablet, Refills: 0  Start date: 9/10/2022, End date: 9/10/2022      carvediloL (COREG) 3.125 mg tablet Take 1 Tablet by mouth two (2) times daily (with meals).   Qty: 60 Tablet, Refills: 1  Start date: 9/9/2022      isosorbide mononitrate ER (IMDUR) 30 mg tablet Take 1 Tablet by mouth daily. Qty: 30 Tablet, Refills: 1  Start date: 9/9/2022           CONTINUE these medications which have NOT CHANGED    Details   midodrine (PROAMATINE) 2.5 mg tablet Take 1 Tablet by mouth two (2) times a day for 30 days. Qty: 60 Tablet, Refills: 0    Associated Diagnoses: Hypotension due to hypovolemia      nitroglycerin (NITROSTAT) 0.4 mg SL tablet 1 Tablet by SubLINGual route every five (5) minutes as needed for Chest Pain. Up to 3 doses. Qty: 1 Each, Refills: 1      triamcinolone acetonide (KENALOG) 0.1 % ointment Apply  to affected area two (2) times a day. use thin layer  Qty: 30 g, Refills: 1    Associated Diagnoses: Dermatitis      folic acid (FOLVITE) 1 mg tablet Take 1 Tablet by mouth in the morning. Qty: 30 Tablet, Refills: 0      potassium chloride (K-DUR, KLOR-CON M20) 20 mEq tablet Take 1 Tablet by mouth in the morning. Qty: 30 Tablet, Refills: 0      ondansetron (ZOFRAN ODT) 4 mg disintegrating tablet Take 1 Tablet by mouth every eight (8) hours as needed for Nausea or Vomiting. Qty: 30 Tablet, Refills: 0      atorvastatin (LIPITOR) 80 mg tablet TAKE 1 TABLET BY MOUTH ONCE DAILY NIGHTLY  Qty: 90 Tablet, Refills: 3    Associated Diagnoses: Atherosclerosis of native coronary artery of native heart with stable angina pectoris ([facility] Coronary artery disease of native artery of native heart with stable angina pectoris ([facility])      allopurinoL (ZYLOPRIM) 100 mg tablet Take 1 tablet by mouth once daily  Qty: 90 Tablet, Refills: 3    Associated Diagnoses: Acute gout due to renal impairment involving left foot      CALCITRIOL PO Take 0.5 mg by mouth.      sucralfate (CARAFATE) 1 gram tablet Take 1 tablet by mouth 4 times daily  Qty: 120 Tablet, Refills: 5      albuterol (PROAIR HFA) 90 mcg/actuation inhaler Take 2 Puffs by inhalation every four (4) hours as needed for Wheezing.   Qty: 1 Inhaler, Refills: 5      Oxygen O2 2L NC 24/7           STOP taking these medications clotrimazole (MYCELEX) 10 mg kevin Comments:   Reason for Stopping:         docusate sodium (STOOL SOFTENER PO) Comments:   Reason for Stopping:                Diet:  Cardiac diet, renal, low salt    Activity:  As tolerated     Disposition: Home with [facility]    Discharge instructions to patient/family  Please seek medical attention for any new or worsening symptoms particularly fever, chest pain, shortness of breath, abdominal pain, nausea, vomiting    Follow up plans/appointments  Follow-up Information       Follow up With Specialties Details Why Contact Info    Ronaldo Boston, [address] [facility] [address]  [phone]      [address] Follow up  [address] Hernandez MD  Family Medicine Resident       For Billing    Chief Complaint   Patient presents with    Nausea    Vomiting    Fatigue       Hospital Problems  Date Reviewed: 9/2/2022            Codes Class Noted POA    * (Principal) Intractable vomiting with nausea ICD-10-CM: R11.2  ICD-9-CM: 536.2  9/2/2022 Yes        Hypokalemia ICD-10-CM: E87.6  ICD-9-CM: 276.8  7/23/2022 Yes        Supratherapeutic INR ICD-10-CM: R79.1  ICD-9-CM: 790.92  9/2/2022 Yes        Hyponatremia ICD-10-CM: E87.1  ICD-9-CM: 276.1  9/2/2022 Yes        Abdominal pain ICD-10-CM: R10.9  ICD-9-CM: 789.00  7/23/2022 Yes        (HFpEF) heart failure with preserved ejection fraction ([facility] ICD-10-CM: I50.30  ICD-9-CM: 428.9  9/23/2018 Yes        ESRD on peritoneal dialysis [facility]) ICD-10-CM: N18.6, Z99.2  ICD-9-CM: 585.6, V45.11  6/23/2018 Yes        Hx of deep venous thrombosis ICD-10-CM: Z86.718  ICD-9-CM: V12.51  5/30/2018 Yes        Diabetic gastroparesis ([facility] ICD-10-CM: E11.43, K31.84  ICD-9-CM: 250.60, 536.3  Unknown Yes        GERD (gastroesophageal reflux disease) ICD-10-CM: K21.9  ICD-9-CM: 530.81  Unknown Yes        DM type 2 causing neurological disease ([facility] ICD-10-CM: E11.49  ICD-9-CM: 250.60  Unknown Yes        Gout ICD-10-CM: M10.9  ICD-9-CM: 274.9  Unknown Yes        ILD (interstitial lung disease) ([facility] ICD-10-CM: J84.9  ICD-9-CM: 960  8/20/2017 Yes        Warfarin anticoagulation ICD-10-CM: Z79.01  ICD-9-CM: V58.61  8/20/2017 Yes        HTN (hypertension) (Chronic) ICD-10-CM: I10  ICD-9-CM: 401.9  10/1/2012 Yes        Coronary artery disease (Chronic) ICD-10-CM: I25.10  ICD-9-CM: 414.00  2/16/2011 Yes    Overview Addendum 10/5/2012  7:33 AM by Marvin Petersen, [address] 2004  1v CAD by cath - PCI OM with SAL  Cath 5/2004 - patent stent, no new disease  Lexiscan cardiolite 12/09- normal perfusion, EF 66%  Cath: 3/19/12: PA 55/30 mean 41, wedge 26, RA 24, EDP 35, LVG 55%, LM normal, LAD normal, LCX - OM1 patent stent, OM2 prox 85% long, % with L to R collaterals                JASEN on CPAP (Chronic) ICD-10-CM: G47.33, Z99.89  ICD-9-CM: 327.23, V46.8  2/16/2011 Yes    Overview Signed 3/21/2012  8:04 AM by Ervin Dueñas CPAP

## 2022-09-06 NOTE — PROGRESS NOTES
Blood transfusion completed at 1820. No reports of or s/s of any transfusion reactions noted at this time. Will notify oncoming RN to collect H&H at 2020.

## 2022-09-06 NOTE — PROGRESS NOTES
9/6/2022  Case Management Progress Note    12:18 PM  Patient is 61year old female admitted 9/1 with intractable nausea and vomiting  Patient's RUR is 24% red/high risk for readmission  Covid test: negative 9/4   Chart reviewed--patient discussed at IDR rounds  Per rounds patient is tolerating HD well and we are working on arranging her outpatient HD at [facility]. Hepatitis B labs are still pending. Will send to [facility] to complete setup when ready. Patient is open to home health through [facility] and will need resumption orders at discharge. Will continue to follow and assist with discharge planning.      Transition of Care Plan   Continue medical management/treatment  Home with [facility] resumption, outpatient HD setup  Family will transport at discharge  Follow up outpatient as indicated  CM will continue to follow    Catheryn Leventhal, MSW

## 2022-09-06 NOTE — PROGRESS NOTES
[address]  YOB: 1955          Assessment & Plan:     ESRD on PD at [facility]  N/V ?gastroparesis ?uremia/underdialysis ?other  Hypotension  Hypokalemia  Anemia    Rec:  Seen on HD jacob well  ALDO with HD  Arranging outpt HD at [address]  She wants to keep PD cath and maybe re-try in the future. Will follow up with [place] PD for flushing etc       Subjective:   CC: ESRD  HPI: Patient seen on HD. Jacob well.    ROS: abd pain and nausea  Current Facility-Administered Medications   Medication Dose Route Frequency    mylanta/viscous lidocaine (GI COCKTAIL)  40 mL Oral ONCE    0.9% sodium chloride infusion 250 mL  250 mL IntraVENous PRN    erythromycin lactobionate (ERYTHROCIN) 350 mg in 0.9% sodium chloride 250 mL IVPB  350 mg IntraVENous Q8H    epoetin charlie-epbx (RETACRIT) injection 10,000 Units  10,000 Units IntraVENous DIALYSIS TUE, THU & SAT    bisacodyL (DULCOLAX) tablet 5 mg  5 mg Oral Q12H    polyethylene glycol (MIRALAX) packet 17 g  17 g Oral Q12H    senna-docusate (PERICOLACE) 8.6-50 mg per tablet 1 Tablet  1 Tablet Oral Q12H    pantoprazole (PROTONIX) 40 mg in 0.9% sodium chloride 10 mL injection  40 mg IntraVENous Q12H    Warfarin Pharmacy to dose   Other Rx Dosing/Monitoring    ondansetron (ZOFRAN) injection 8 mg  8 mg IntraVENous Q6H    gentamicin (GARAMYCIN) 0.1 % ointment   Topical DAILY    gentamicin (GARAMYCIN) 0.1 % cream   Topical DIALYSIS PRN    nitroglycerin (NITROSTAT) tablet 0.4 mg  0.4 mg SubLINGual Q5MIN PRN    sodium chloride (NS) flush 5-40 mL  5-40 mL IntraVENous Q8H    sodium chloride (NS) flush 5-40 mL  5-40 mL IntraVENous PRN    acetaminophen (TYLENOL) tablet 650 mg  650 mg Oral Q6H PRN    Or    acetaminophen (TYLENOL) suppository 650 mg  650 mg Rectal Q6H PRN    albuterol-ipratropium (DUO-NEB) 2.5 MG-0.5 MG/3 ML  3 mL Nebulization Q6H PRN    allopurinoL (ZYLOPRIM) tablet 100 mg  100 mg Oral DAILY    atorvastatin (LIPITOR) tablet 80 mg  80 mg Oral QHS    calcitRIOL (ROCALTROL) capsule 0.5 mcg  0.5 mcg Oral DAILY    folic acid (FOLVITE) tablet 1 mg  1 mg Oral DAILY    midodrine (PROAMATINE) tablet 2.5 mg  2.5 mg Oral BID    potassium chloride SR (KLOR-CON 10) tablet 20 mEq  20 mEq Oral DAILY    sucralfate (CARAFATE) tablet 1 g  1 g Oral AC&HS          Objective:     Vitals:  Blood pressure 114/63, pulse 98, temperature 98.5 °F (36.9 °C), resp. rate (!) 5, height 5' 10\" (1.778 m), weight 120.3 kg (265 lb 3.4 oz), SpO2 100 %. Temp (24hrs), Av.4 °F (36.9 °C), Min:97.7 °F (36.5 °C), Max:98.8 °F (37.1 °C)      Intake and Output:  No intake/output data recorded.  1901 -  0700  In: 36 [P.O.:820]  Out: 88     Physical Exam:               GENERAL ASSESSMENT: NAD  HEENT: Nontraumatic   CHEST: unlabored  HEART: S1S2  ABDOMEN: Soft,NT  EXTREMITY: +EDEMA; L FA AVF  NEURO: Grossly non focal          ECG/rhythm:    Data Review      No results for input(s): TNIPOC in the last 72 hours. No lab exists for component: ITNL   No results for input(s): CPK, CKMB, TROIQ in the last 72 hours. Recent Labs     22  0420   * 134* 133*   K 4.1 3.5 3.5    98 98   CO2 25 27 26   BUN 54* 50* 49*   CREA 11.10* 10.30* 10.20*   GLU 87 93 98   CA 8.5 8.8 8.5   WBC 9.0 10.3 10.4   HGB 6.8* 7.0* 7.0*   HCT 21.3* 22.1* 22.1*    180 183      Recent Labs     2245 22  0420   INR 1.8* 1.3* 1.3*   PTP 18.6* 12.9* 13.7*     Needs: urine analysis, urine sodium, protein and creatinine  Lab Results   Component Value Date/Time    Sodium,urine random 25 11/10/2014 12:40 PM    Creatinine, urine 145.29 2017 05:01 AM           : Ranjan Das MD  2022        [facility]:  [email or web address]  [facility] office:  [address] [place] [address],[address]  [place], [address] [place] [address] [address]w  Phone: [phone]  Fax :     [phone]    [place] office:  [address] [facility]  [place], [facility] [address]  Phone - [phone]  Fax - [phone]

## 2022-09-06 NOTE — PROGRESS NOTES
[facility] Dosing Services: 9/6/2022    Consult for Warfarin Dosing by Pharmacy by Dr. Pearson Books provided for this 61 y.o.  female , for indication of History of Venous Thrombosis (confirmed no current VTE). Day of Therapy Resumed from PTA medication list  Dose to achieve an INR goal of 2-3    Order entered for  Warfarin  2.5 (mg) ordered to be given today at 18:00. Patient normally receives 2.5 mg on Tuesdays. INR subtherapeutic today, but with recent supratherapeutic level of 13.5 and concurrent use of erythromycin in addition to allopurinol (home), will not increase beyond normal home dose. Significant drug interactions: allopurinol (home) and erythromycin may increase risk of bleeding and raise INR  Previous dose given 1 mg on 9/5  2.5 mg on 9/4  2.5 mg on 9/3   PT/INR Lab Results   Component Value Date/Time    INR 1.8 (H) 09/06/2022 08:45 AM      Platelets Lab Results   Component Value Date/Time    PLATELET 816 58/55/2289 08:45 AM      H/H Lab Results   Component Value Date/Time    HGB 6.8 (L) 09/06/2022 08:45 AM        Pharmacy to follow daily and will provide subsequent Warfarin dosing based on clinical status.   Nita Clear, JOHNSON)  Contact information [phone]

## 2022-09-06 NOTE — PROGRESS NOTES
Bedside shift change report given to Mu (oncoming nurse) by Carole Gilmore (offgoing nurse). Report included the following information SBAR, Kardex, Intake/Output, MAR, and Recent Results.

## 2022-09-06 NOTE — PROGRESS NOTES
[address] NP  [phone]           GI PROGRESS NOTE        NAME: Rory Jeong   :  1955   MRN:  [id number]       Subjective:   Reports her abdomen feels \"rough\". States she developed dark black stools. Objective:   Ill and uncomfortable appearing. VITALS:   Last 24hrs VS reviewed since prior progress note. Most recent are:  Visit Vitals  /68   Pulse (!) 102   Temp 98.5 °F (36.9 °C)   Resp 18   Ht 5' 10\" (1.778 m)   Wt 120.3 kg (265 lb 3.4 oz)   SpO2 100%   BMI 38.05 kg/m²       Intake/Output Summary (Last 24 hours) at 2022 1454  Last data filed at 2022 1804  Gross per 24 hour   Intake 240 ml   Output --   Net 240 ml       PHYSICAL EXAM:  General: Alert, in no acute distress    HEENT: Anicteric sclerae. Lungs:            CTA Bilaterally. Heart:  Regular  rhythm,    Abdomen: Soft, Non distended, Non tender.  (+)Bowel sounds, no HSM  Extremities: No c/c/e  Neurologic:  CN 2-12 gi, Alert and oriented X 3. No acute neurological distress   Psych:   Good insight. Not anxious nor agitated. Lab Data Reviewed:   Recent Labs     22  0845 22  0315   WBC 9.0 10.3   HGB 6.8* 7.0*   HCT 21.3* 22.1*    180     Recent Labs     22  0845 22  0315   * 134*   K 4.1 3.5    98   CO2 25 27   BUN 54* 50*   CREA 11.10* 10.30*   GLU 87 93   CA 8.5 8.8     No results for input(s): AP, TBIL, TP, ALB, GLOB, GGT, AML, LPSE in the last 72 hours.     No lab exists for component: SGOT, GPT, AMYP, HLPSE    ________________________________________________________________________  Patient Active Problem List   Diagnosis Code    Coronary artery disease I25.10    JASEN on CPAP G47.33, Z99.89    Pulmonary hypertension (HCC) I27.20    HTN (hypertension) I10    Morbid obesity E66.01    Normocytic anemia D64.9    DM (diabetes mellitus), type 2 with renal complications (HCC) T39.11    ILD (interstitial lung disease) ([facility] J84.9 Warfarin anticoagulation Z79.01    S/P left pulmonary artery pressure sensor implant placement Z95.9    Hx of deep venous thrombosis Z86.718    Sleep apnea G47.30    Diabetic gastroparesis (HCC) E11.43, K31.84    GERD (gastroesophageal reflux disease) K21.9    DM type 2 causing neurological disease ([facility] E11.49    DM type 2 causing vascular disease ([facility] E11.59    Gout M10.9    Limited mobility Z74.09    ESRD on peritoneal dialysis ([facility] N18.6, Z99.2    (HFpEF) heart failure with preserved ejection fraction (HCC) I50.30    Nephrolithiasis N20.0    PVC (premature ventricular contraction) I49.3    S/P ORIF (open reduction internal fixation) fracture Z98.890, Z87.81    Coronary stent patent Z95.5    Edema of both lower extremities R60.0    Hypokalemia E87.6    Chest pain R07.9    Abdominal pain R10.9    Ascites R18.8    Intractable vomiting with nausea R11.2    Hyponatremia E87.1    Supratherapeutic INR R79.1         Assessment and Plan:  Intractable Nausea and Vomiting: This could potentially due to CHF, Uremia, gastroparesis PUD. Culture of peritoneal fluid with no growth. - Clear liquid diet  - Continue monitoring hermoglobin   - Continue supportive care with antiemetics. - Continue PPI BID, QID Carafate  - Erythromycin has been started by Primary team  - No plans for Endoscopic evaluation at this time. Pt discussed with Dr. Gil Preston.      Following     Signed By: Shelly Dunn NP     9/6/2022  2:54 PM

## 2022-09-07 LAB
ABO + RH BLD: NORMAL
ANION GAP SERPL CALC-SCNC: 9 MMOL/L (ref 5–15)
BASOPHILS # BLD: 0.1 K/UL (ref 0–0.1)
BASOPHILS NFR BLD: 1 % (ref 0–1)
BLD PROD TYP BPU: NORMAL
BLOOD GROUP ANTIBODIES SERPL: NORMAL
BPU ID: NORMAL
BUN SERPL-MCNC: 30 MG/DL (ref 6–20)
BUN/CREAT SERPL: 4 (ref 12–20)
CALCIUM SERPL-MCNC: 9 MG/DL (ref 8.5–10.1)
CHLORIDE SERPL-SCNC: 101 MMOL/L (ref 97–108)
CO2 SERPL-SCNC: 26 MMOL/L (ref 21–32)
CREAT SERPL-MCNC: 7.41 MG/DL (ref 0.55–1.02)
CROSSMATCH RESULT,%XM: NORMAL
DIFFERENTIAL METHOD BLD: ABNORMAL
EOSINOPHIL # BLD: 0.5 K/UL (ref 0–0.4)
EOSINOPHIL NFR BLD: 4 % (ref 0–7)
ERYTHROCYTE [DISTWIDTH] IN BLOOD BY AUTOMATED COUNT: 15.7 % (ref 11.5–14.5)
GLUCOSE SERPL-MCNC: 86 MG/DL (ref 65–100)
HBV CORE AB SERPL QL IA: NEGATIVE
HBV SURFACE AG SER QL: <0.1 INDEX
HBV SURFACE AG SER QL: NEGATIVE
HCT VFR BLD AUTO: 24.5 % (ref 35–47)
HEMOCCULT STL QL: NEGATIVE
HGB BLD-MCNC: 7.8 G/DL (ref 11.5–16)
IMM GRANULOCYTES # BLD AUTO: 0.1 K/UL (ref 0–0.04)
IMM GRANULOCYTES NFR BLD AUTO: 1 % (ref 0–0.5)
INR PPP: 2.8 (ref 0.9–1.1)
LYMPHOCYTES # BLD: 1.8 K/UL (ref 0.8–3.5)
LYMPHOCYTES NFR BLD: 16 % (ref 12–49)
MCH RBC QN AUTO: 31.1 PG (ref 26–34)
MCHC RBC AUTO-ENTMCNC: 31.8 G/DL (ref 30–36.5)
MCV RBC AUTO: 97.6 FL (ref 80–99)
MONOCYTES # BLD: 0.8 K/UL (ref 0–1)
MONOCYTES NFR BLD: 7 % (ref 5–13)
NEUTS SEG # BLD: 7.8 K/UL (ref 1.8–8)
NEUTS SEG NFR BLD: 71 % (ref 32–75)
NRBC # BLD: 0 K/UL (ref 0–0.01)
NRBC BLD-RTO: 0 PER 100 WBC
PLATELET # BLD AUTO: 201 K/UL (ref 150–400)
PMV BLD AUTO: 11 FL (ref 8.9–12.9)
POTASSIUM SERPL-SCNC: 4.1 MMOL/L (ref 3.5–5.1)
PROTHROMBIN TIME: 27 SEC (ref 9–11.1)
RBC # BLD AUTO: 2.51 M/UL (ref 3.8–5.2)
SODIUM SERPL-SCNC: 136 MMOL/L (ref 136–145)
SPECIMEN EXP DATE BLD: NORMAL
STATUS OF UNIT,%ST: NORMAL
UNIT DIVISION, %UDIV: 0
WBC # BLD AUTO: 10.9 K/UL (ref 3.6–11)

## 2022-09-07 PROCEDURE — 74011250636 HC RX REV CODE- 250/636: Performed by: INTERNAL MEDICINE

## 2022-09-07 PROCEDURE — 99232 SBSQ HOSP IP/OBS MODERATE 35: CPT | Performed by: FAMILY MEDICINE

## 2022-09-07 PROCEDURE — 74011250637 HC RX REV CODE- 250/637: Performed by: STUDENT IN AN ORGANIZED HEALTH CARE EDUCATION/TRAINING PROGRAM

## 2022-09-07 PROCEDURE — 85610 PROTHROMBIN TIME: CPT

## 2022-09-07 PROCEDURE — 74011250637 HC RX REV CODE- 250/637

## 2022-09-07 PROCEDURE — 94761 N-INVAS EAR/PLS OXIMETRY MLT: CPT

## 2022-09-07 PROCEDURE — 74011000250 HC RX REV CODE- 250

## 2022-09-07 PROCEDURE — 87340 HEPATITIS B SURFACE AG IA: CPT

## 2022-09-07 PROCEDURE — 36415 COLL VENOUS BLD VENIPUNCTURE: CPT

## 2022-09-07 PROCEDURE — 77010033678 HC OXYGEN DAILY

## 2022-09-07 PROCEDURE — 74011250636 HC RX REV CODE- 250/636: Performed by: STUDENT IN AN ORGANIZED HEALTH CARE EDUCATION/TRAINING PROGRAM

## 2022-09-07 PROCEDURE — 82272 OCCULT BLD FECES 1-3 TESTS: CPT

## 2022-09-07 PROCEDURE — 85025 COMPLETE CBC W/AUTO DIFF WBC: CPT

## 2022-09-07 PROCEDURE — 80048 BASIC METABOLIC PNL TOTAL CA: CPT

## 2022-09-07 PROCEDURE — 65270000046 HC RM TELEMETRY

## 2022-09-07 RX ORDER — POLYETHYLENE GLYCOL 3350 17 G/17G
17 POWDER, FOR SOLUTION ORAL
Status: SHIPPED | COMMUNITY
Start: 2022-09-07

## 2022-09-07 RX ADMIN — ONDANSETRON 8 MG: 2 INJECTION INTRAMUSCULAR; INTRAVENOUS at 18:17

## 2022-09-07 RX ADMIN — SUCRALFATE 1 G: 1 TABLET ORAL at 18:18

## 2022-09-07 RX ADMIN — SUCRALFATE 1 G: 1 TABLET ORAL at 05:40

## 2022-09-07 RX ADMIN — Medication 10 ML: at 14:32

## 2022-09-07 RX ADMIN — CALCITRIOL CAPSULES 0.25 MCG 0.5 MCG: 0.25 CAPSULE ORAL at 10:12

## 2022-09-07 RX ADMIN — POLYETHYLENE GLYCOL 3350 17 G: 17 POWDER, FOR SOLUTION ORAL at 00:00

## 2022-09-07 RX ADMIN — DOCUSATE SODIUM 50MG AND SENNOSIDES 8.6MG 1 TABLET: 8.6; 5 TABLET, FILM COATED ORAL at 10:12

## 2022-09-07 RX ADMIN — ERYTHROMYCIN 250 MG: 250 CAPSULE, DELAYED RELEASE PELLETS ORAL at 18:21

## 2022-09-07 RX ADMIN — ONDANSETRON 8 MG: 2 INJECTION INTRAMUSCULAR; INTRAVENOUS at 23:41

## 2022-09-07 RX ADMIN — POTASSIUM CHLORIDE 20 MEQ: 750 TABLET, FILM COATED, EXTENDED RELEASE ORAL at 10:13

## 2022-09-07 RX ADMIN — ERYTHROMYCIN 250 MG: 250 CAPSULE, DELAYED RELEASE PELLETS ORAL at 14:31

## 2022-09-07 RX ADMIN — LIDOCAINE HYDROCHLORIDE 40 ML: 20 SOLUTION ORAL; TOPICAL at 10:13

## 2022-09-07 RX ADMIN — ALLOPURINOL 100 MG: 100 TABLET ORAL at 10:13

## 2022-09-07 RX ADMIN — POLYETHYLENE GLYCOL 3350 17 G: 17 POWDER, FOR SOLUTION ORAL at 10:12

## 2022-09-07 RX ADMIN — ACETAMINOPHEN 650 MG: 325 TABLET, FILM COATED ORAL at 22:44

## 2022-09-07 RX ADMIN — DOCUSATE SODIUM 50MG AND SENNOSIDES 8.6MG 1 TABLET: 8.6; 5 TABLET, FILM COATED ORAL at 00:00

## 2022-09-07 RX ADMIN — ONDANSETRON 8 MG: 2 INJECTION INTRAMUSCULAR; INTRAVENOUS at 12:34

## 2022-09-07 RX ADMIN — BISACODYL 5 MG: 5 TABLET, DELAYED RELEASE ORAL at 10:12

## 2022-09-07 RX ADMIN — NITROGLYCERIN 0.4 MG: 0.4 TABLET, ORALLY DISINTEGRATING SUBLINGUAL at 22:27

## 2022-09-07 RX ADMIN — BISACODYL 5 MG: 5 TABLET, DELAYED RELEASE ORAL at 20:18

## 2022-09-07 RX ADMIN — DOCUSATE SODIUM 50MG AND SENNOSIDES 8.6MG 1 TABLET: 8.6; 5 TABLET, FILM COATED ORAL at 20:18

## 2022-09-07 RX ADMIN — ACETAMINOPHEN 650 MG: 325 TABLET, FILM COATED ORAL at 01:21

## 2022-09-07 RX ADMIN — EPOETIN ALFA-EPBX 10000 UNITS: 10000 INJECTION, SOLUTION INTRAVENOUS; SUBCUTANEOUS at 00:06

## 2022-09-07 RX ADMIN — SUCRALFATE 1 G: 1 TABLET ORAL at 22:28

## 2022-09-07 RX ADMIN — FOLIC ACID 1 MG: 1 TABLET ORAL at 10:12

## 2022-09-07 RX ADMIN — ONDANSETRON 8 MG: 2 INJECTION INTRAMUSCULAR; INTRAVENOUS at 05:39

## 2022-09-07 RX ADMIN — PANTOPRAZOLE SODIUM 40 MG: 40 TABLET, DELAYED RELEASE ORAL at 10:24

## 2022-09-07 RX ADMIN — MIDODRINE HYDROCHLORIDE 2.5 MG: 5 TABLET ORAL at 18:15

## 2022-09-07 RX ADMIN — PANTOPRAZOLE SODIUM 40 MG: 40 TABLET, DELAYED RELEASE ORAL at 05:41

## 2022-09-07 RX ADMIN — SUCRALFATE 1 G: 1 TABLET ORAL at 12:34

## 2022-09-07 RX ADMIN — ATORVASTATIN CALCIUM 80 MG: 20 TABLET, FILM COATED ORAL at 22:28

## 2022-09-07 RX ADMIN — PANTOPRAZOLE SODIUM 40 MG: 40 TABLET, DELAYED RELEASE ORAL at 18:17

## 2022-09-07 RX ADMIN — SUCRALFATE 1 G: 1 TABLET ORAL at 10:25

## 2022-09-07 RX ADMIN — NITROGLYCERIN 0.4 MG: 0.4 TABLET, ORALLY DISINTEGRATING SUBLINGUAL at 22:35

## 2022-09-07 RX ADMIN — Medication 10 ML: at 05:41

## 2022-09-07 RX ADMIN — Medication 5 ML: at 22:28

## 2022-09-07 RX ADMIN — Medication 10 ML: at 00:00

## 2022-09-07 RX ADMIN — MIDODRINE HYDROCHLORIDE 2.5 MG: 5 TABLET ORAL at 10:12

## 2022-09-07 RX ADMIN — SUCRALFATE 1 G: 1 TABLET ORAL at 00:00

## 2022-09-07 NOTE — PROGRESS NOTES
9/7/2022  Case Management Progress Note    12:30 PM  Patient is 61year old female admitted 9/1 with intractable nausea and vomiting  Patient's RUR is 25% red/high risk for readmission  Covid test: negative 9/4   Chart reviewed--patient discussed at IDR rounds  I have sent off all resulted hepatitis labs and patient's CXR to [facility]. Await finalization of chair. Patient is open to [facility] for home health and will need it resumed prior to going home. Patient has continually deferred therapy evaluations including this morning, and having those is important in making sure that patient is safe for home. Will continue to follow and assist with discharge planning.      Transition of Care Plan   Continue medical management/treatment  Await finalization of dialysis chair--labs sent  Patient should work with PT/OT prior to discharge to confirm that home with resumption of HH is appropriate  Family will transport as able  Follow up outpatient as indicated  CM will continue to follow and assist    CHANDA Kirk

## 2022-09-07 NOTE — DISCHARGE INSTRUCTIONS
HOME DISCHARGE INSTRUCTIONS    Landry Blanca / [id number] : 1955    Admission date: 2022 Discharge date: 2022 7:21 AM     Please bring this form with you to show your care provider at your follow-up appointment. Primary care provider:  Ronaldo Boston      Discharging provider:  Jimena Hernandez MD  - Family Medicine Resident  Dr. Xochitl Garcia Attending      You have been admitted to the hospital with the following diagnoses:    ACUTE DIAGNOSES:  Abdominal pain [R10.9]    . . . . . . . . . . . . . . . . . . . . . . . . . . . . . . . . . . . . . . . . . . . . . . . . . . . . . . . . . . . . . . . . . . . . . . . Waqar Lynn FOLLOW-UP CARE RECOMMENDATIONS:    Future Appointments   Date Time Provider Meagan Mcintyre   2022 11:30 AM Hallie Levine,  IFP BS AMB   No follow-up provider specified. Warfarin dosing:  - Please take 1mg daily and get your PT/INR checked     Follow-up with PCP regarding:   - Warfarin dosing, PT/INR monitoring  - Final results on lab work for copper, B1, B6, and zinc levels  - Anemia; recheck CBC  - Low potassium; Recheck BMP    Follow-up with GI doctor regarding:  - Possible scopes  - Management of gastroparesis and symptoms    Follow-up with Cardiologist regarding:  - Consider switch to DOAC instead of warfarin for ease of dosing    Follow-up tests needed: PT/INR for warfarin dosing, CBC for anemia, BMP for potassium    Pending test results: At the time of your discharge the following test results are still pending: copper, B1, B6, zinc.   Please make sure you review these results with your outpatient follow-up provider(s).     DIET/what to eat:  Cardiac Diet (low salt intake)    ACTIVITY:  Activity as tolerated     Wound care: continue care of sacral ulcer    Equipment needed: No new equipment    Specific symptoms to watch for: chest pain, shortness of breath, fever, chills, nausea, vomiting, diarrhea, change in mentation, falling, weakness, bleeding. What to do if new or unexpected symptoms occur? If you experience any of the above symptoms (or should other concerns or questions arise after discharge) please call your primary care physician. Return to the emergency room if you cannot get hold of your doctor. It is very important that you keep your follow-up appointment(s). Please bring discharge papers, medication list (and/or medication bottles) to your follow-up appointments for review by your outpatient provider(s). Please check the list of medications and be sure it includes every medication (even non-prescription medications) that your provider wants you to take. It is important that you take the medication exactly as they are prescribed. Keep your medication in the bottles provided by the pharmacist and keep a list of the medication names, dosages, and times to be taken in your wallet. Do not take other medications without consulting your doctor. If you have any questions about your medications or other instructions, please talk to your nurse or care provider before you leave the hospital.     Information obtained by:     I understand that if any problems occur once I am at home I am to contact my physician. These instructions were explained to me and I had the opportunity to ask questions. I understand and acknowledge receipt of the instructions indicated above.                                                                                                                                                Physician's or R.N.'s Signature                                                                  Date/Time                                                                                                                                              Patient or Representative Signature                                                          Date/Time

## 2022-09-07 NOTE — PROGRESS NOTES
Physical Therapy orders acknowledged, chart reviewed and discussed with nurse cleared for therapy. Attempted multiple times today for evaluation patient repeatedly refuse therapy evaluation despite explanation of benefits of therapy. Communicated with nurse who agreed to monitor patient.

## 2022-09-07 NOTE — PROGRESS NOTES
[address] [place]  YOB: 1955          Assessment & Plan:     ESRD on PD at [facility]  N/V ?gastroparesis ?uremia/underdialysis ?other  Hypotension  Hypokalemia  Anemia    Rec:  HD yesterday. HD TTS for now  ALDO with HD  Arranging outpt HD at [facility] hep raine  She wants to keep PD cath and maybe re-try in the future. Will follow up with [place] PD for flushing etc. I am not sure that PD is going to work. Subjective:   CC: ESRD  HPI: Eating breakfast. Still has abd pain. HD yesterday.  Working on [facility]   ROS: +abd pain, no vomiting  Current Facility-Administered Medications   Medication Dose Route Frequency    0.9% sodium chloride infusion 250 mL  250 mL IntraVENous PRN    pantoprazole (PROTONIX) tablet 40 mg  40 mg Oral ACB&D    erythromycin (ERYC) capsule 250 mg  250 mg Oral TIDAC    epoetin charlie-epbx (RETACRIT) injection 10,000 Units  10,000 Units IntraVENous DIALYSIS TUE, THU & SAT    bisacodyL (DULCOLAX) tablet 5 mg  5 mg Oral Q12H    polyethylene glycol (MIRALAX) packet 17 g  17 g Oral Q12H    senna-docusate (PERICOLACE) 8.6-50 mg per tablet 1 Tablet  1 Tablet Oral Q12H    Warfarin Pharmacy to dose   Other Rx Dosing/Monitoring    ondansetron (ZOFRAN) injection 8 mg  8 mg IntraVENous Q6H    gentamicin (GARAMYCIN) 0.1 % ointment   Topical DAILY    gentamicin (GARAMYCIN) 0.1 % cream   Topical DIALYSIS PRN    nitroglycerin (NITROSTAT) tablet 0.4 mg  0.4 mg SubLINGual Q5MIN PRN    sodium chloride (NS) flush 5-40 mL  5-40 mL IntraVENous Q8H    sodium chloride (NS) flush 5-40 mL  5-40 mL IntraVENous PRN    acetaminophen (TYLENOL) tablet 650 mg  650 mg Oral Q6H PRN    Or    acetaminophen (TYLENOL) suppository 650 mg  650 mg Rectal Q6H PRN    albuterol-ipratropium (DUO-NEB) 2.5 MG-0.5 MG/3 ML  3 mL Nebulization Q6H PRN    allopurinoL (ZYLOPRIM) tablet 100 mg  100 mg Oral DAILY    atorvastatin (LIPITOR) tablet 80 mg  80 mg Oral QHS    calcitRIOL (ROCALTROL) capsule 0.5 mcg  0.5 mcg Oral DAILY    folic acid (FOLVITE) tablet 1 mg  1 mg Oral DAILY    midodrine (PROAMATINE) tablet 2.5 mg  2.5 mg Oral BID    potassium chloride SR (KLOR-CON 10) tablet 20 mEq  20 mEq Oral DAILY    sucralfate (CARAFATE) tablet 1 g  1 g Oral AC&HS          Objective:     Vitals:  Blood pressure 116/74, pulse 93, temperature 98.6 °F (37 °C), resp. rate 16, height 5' 10\" (1.778 m), weight 123.1 kg (271 lb 4.8 oz), SpO2 100 %. Temp (24hrs), Av.5 °F (36.9 °C), Min:98 °F (36.7 °C), Max:98.9 °F (37.2 °C)      Intake and Output:  No intake/output data recorded.  1901 -  0700  In: 526 [P.O.:120]  Out: -     Physical Exam:               GENERAL ASSESSMENT: NAD  HEENT: Nontraumatic   CHEST: Clear  HEART: Reg  ABDOMEN: Soft,NT  EXTREMITY: +EDEMA; L FA AVF  NEURO: Grossly non focal          ECG/rhythm:    Data Review      No results for input(s): TNIPOC in the last 72 hours. No lab exists for component: ITNL   No results for input(s): CPK, CKMB, TROIQ in the last 72 hours. Recent Labs     22  0845 22  0315    134* 134*   K 4.1 4.1 3.5    100 98   CO2 26 25 27   BUN 30* 54* 50*   CREA 7.41* 11.10* 10.30*   GLU 86 87 93   CA 9.0 8.5 8.8   WBC 10.9 9.0 10.3   HGB 7.8* 6.8* 7.0*   HCT 24.5* 21.3* 22.1*    185 180        Recent Labs     22  0845 22  0315   INR 2.8* 1.8* 1.3*   PTP 27.0* 18.6* 12.9*       Needs: urine analysis, urine sodium, protein and creatinine  Lab Results   Component Value Date/Time    Sodium,urine random 25 11/10/2014 12:40 PM    Creatinine, urine 145.29 2017 05:01 AM           : Katheryn Chase MD  2022        [facility]:  [email or web address] PPEC  [place]  office:  [address] [address] [place] [address]  Phone: [phone]  Fax :     [phone]    [address] [facility] office:  [place] Choi [address] [facility]  Phone - [phone]  Fax - [phone]

## 2022-09-07 NOTE — PROGRESS NOTES
[facility] Dosing Services: 9/7/2022    Consult for Warfarin Dosing by Pharmacy by Dr. Domitila Pierre provided for this 61 y.o.  female , for indication of History of Venous Thrombosis (confirmed no current VTE). Day of Therapy Resumed from PTA medication list (1mg on MWF, 2.5mg rest of week)    Dose to achieve an INR goal of 2-3    Order entered to hold Warfarin today due to large jump in INR. Will hold warfarin today to try and prevent patient from becoming supratherapeutic, overall very labile INRs. Hgb 7.8 today (increased post transfusion yesterday), FOBT ordered to ensure no active bleeding. Patient may require lower daily dosing at discharge, continuing to follow trend. Significant drug interactions: allopurinol (home med), erythromycin   Previous dose given 2.5mg on 9/6  1 mg on 9/5  2.5 mg on 9/4  2.5 mg on 9/3   PT/INR Lab Results   Component Value Date/Time    INR 2.8 (H) 09/07/2022 02:42 AM      Platelets Lab Results   Component Value Date/Time    PLATELET 457 65/57/4260 02:42 AM      H/H Lab Results   Component Value Date/Time    HGB 7.8 (L) 09/07/2022 02:42 AM        Pharmacy to follow daily and will provide subsequent Warfarin dosing based on clinical status.   Alvarez Macias)  Contact information [phone]

## 2022-09-07 NOTE — PROGRESS NOTES
Ultrasound IV by Anson Olvera RN :  Procedure Note    Patient meets criteria for US IV insertion. Ultrasound IV education provided to patient. Opportunities for questions given. Ultrasound used for PIV placement:  20gauge 3 cm     RIGHT UPPER ARM location. 2 X Attempt(s). Flushed with ease; vigorous blood return and morning labs drawn. Procedure tolerated well. Primary RN aware of IV placement and added to LDA.       Anson Olvera RN

## 2022-09-07 NOTE — PROGRESS NOTES
[address] NP  [phone]           GI PROGRESS NOTE        NAME: Jennifer Cooper   :  1955   MRN:  [id number]       Subjective:   Abdominal pain and nausea are better. Stools are not as dark as yesterday. Objective:   NAD      VITALS:   Last 24hrs VS reviewed since prior progress note. Most recent are:  Visit Vitals  /74 (BP 1 Location: Right lower arm, BP Patient Position: Sitting)   Pulse 93   Temp 98.6 °F (37 °C)   Resp 16   Ht 5' 10\" (1.778 m)   Wt 123.1 kg (271 lb 4.8 oz)   SpO2 100%   BMI 38.93 kg/m²       Intake/Output Summary (Last 24 hours) at 2022 1317  Last data filed at 2022 1821  Gross per 24 hour   Intake 406 ml   Output --   Net 406 ml       PHYSICAL EXAM:  General: Alert, in no acute distress    HEENT: Anicteric sclerae. Lungs:            CTA Bilaterally. Heart:  Regular  rhythm,    Abdomen: Soft, Non distended, Non tender.  (+)Bowel sounds, no HSM  Extremities: No c/c/e  Neurologic:  CN 2-12 gi, Alert and oriented X 3. No acute neurological distress   Psych:   Good insight. Not anxious nor agitated. Lab Data Reviewed:   Recent Labs     22  0242 22  0845   WBC 10.9 9.0   HGB 7.8* 6.8*   HCT 24.5* 21.3*    185     Recent Labs     22  0242 22  0845    134*   K 4.1 4.1    100   CO2 26 25   BUN 30* 54*   CREA 7.41* 11.10*   GLU 86 87   CA 9.0 8.5     No results for input(s): AP, TBIL, TP, ALB, GLOB, GGT, AML, LPSE in the last 72 hours.     No lab exists for component: SGOT, GPT, AMYP, HLPSE    ________________________________________________________________________  Patient Active Problem List   Diagnosis Code    Coronary artery disease I25.10    JASEN on CPAP G47.33, Z99.89    Pulmonary hypertension (HCC) I27.20    HTN (hypertension) I10    Morbid obesity E66.01    Normocytic anemia D64.9    DM (diabetes mellitus), type 2 with renal complications (HCC) K57.66    ILD (interstitial lung disease) ([facility] J84.9    Warfarin anticoagulation Z79.01    S/P left pulmonary artery pressure sensor implant placement Z95.9    Hx of deep venous thrombosis Z86.718    Sleep apnea G47.30    Diabetic gastroparesis (HCC) E11.43, K31.84    GERD (gastroesophageal reflux disease) K21.9    DM type 2 causing neurological disease (HCC) E11.49    DM type 2 causing vascular disease (HCC) E11.59    Gout M10.9    Limited mobility Z74.09    ESRD on peritoneal dialysis ([facility] N18.6, Z99.2    (HFpEF) heart failure with preserved ejection fraction (HCC) I50.30    Nephrolithiasis N20.0    PVC (premature ventricular contraction) I49.3    S/P ORIF (open reduction internal fixation) fracture Z98.890, Z87.81    Coronary stent patent Z95.5    Edema of both lower extremities R60.0    Hypokalemia E87.6    Chest pain R07.9    Abdominal pain R10.9    Ascites R18.8    Intractable vomiting with nausea R11.2    Hyponatremia E87.1    Supratherapeutic INR R79.1         Assessment and Plan:  Intractable Nausea and Vomiting: This could potentially due to CHF, Uremia, gastroparesis PUD. Culture of peritoneal fluid with no growth. Radene Spoon Diet. - Continue monitoring hermoglobin   - Continue supportive care with antiemetics. - Continue PPI BID, QID Carafate  - Erythromycin has been started by Primary team  - No plans for Endoscopic evaluation at this time. Pt discussed with Dr. Shanthi Pritchett. Will see as needed. Please have her follow up in the office as outpatient.            Signed By: Antonio aGlindo NP     9/7/2022  1:17 PM

## 2022-09-07 NOTE — PROGRESS NOTES
ppai  [address] [facility] [address]   Office [phone]  Fax [phone]          Subjective / Objective     Subjective  NAEO    Patient seen and examined at bedside. She says her abdominal pain and CP are unchanged from yesterday. HA improved with tylenol. N/v still improved. Still does not want any pain medication other than tylenol. Says she is having tarry stools. Is upset and wants to be discharged. Respiratory: O2 Device: Nasal cannula 2L, sating 100%  Visit Vitals  /74 (BP 1 Location: Right lower arm, BP Patient Position: Sitting)   Pulse 93   Temp 98.6 °F (37 °C)   Resp 16   Ht 5' 10\" (1.778 m)   Wt 271 lb 4.8 oz (123.1 kg)   SpO2 100%   BMI 38.93 kg/m²     Physical Examination:   General appearance - alert, in no acute distress. Chest - clear to auscultation, no wheezes, rales or rhonchi, symmetric air entry. Heart - normal rate, regular rhythm, normal S1, S2, no murmurs, rubs, clicks or gallops  Abdomen - soft, nondistended, no masses or organomegaly. No guarding. TTP in epigastric region. Neurological - alert, oriented, normal speech, no focal findings  Skin - warm, dry. No notable rashes  Extremities - No clubbing or cyanosis. Moving all extremities spontaneously.   Psychiatric - normal speech and thought processes    I/O:  09/06 0701 - 09/07 0700  In: 526 [P.O.:120]  Out: -   Inpatient Medications  Current Facility-Administered Medications   Medication    mylanta/viscous lidocaine (GI COCKTAIL)    0.9% sodium chloride infusion 250 mL    pantoprazole (PROTONIX) tablet 40 mg    erythromycin [facility]) capsule 250 mg    epoetin charlie-epbx (RETACRIT) injection 10,000 Units    bisacodyL (DULCOLAX) tablet 5 mg    polyethylene glycol (MIRALAX) packet 17 g    senna-docusate (PERICOLACE) 8.6-50 mg per tablet 1 Tablet    Warfarin Pharmacy to dose    ondansetron (ZOFRAN) injection 8 mg    gentamicin (GARAMYCIN) 0.1 % ointment    gentamicin (GARAMYCIN) 0.1 % cream    nitroglycerin (NITROSTAT) tablet 0.4 mg    sodium chloride (NS) flush 5-40 mL    sodium chloride (NS) flush 5-40 mL    acetaminophen (TYLENOL) tablet 650 mg    Or    acetaminophen (TYLENOL) suppository 650 mg    albuterol-ipratropium (DUO-NEB) 2.5 MG-0.5 MG/3 ML    allopurinoL (ZYLOPRIM) tablet 100 mg    atorvastatin (LIPITOR) tablet 80 mg    calcitRIOL (ROCALTROL) capsule 0.5 mcg    folic acid (FOLVITE) tablet 1 mg    midodrine (PROAMATINE) tablet 2.5 mg    potassium chloride SR (KLOR-CON 10) tablet 20 mEq    sucralfate (CARAFATE) tablet 1 g     Current Facility-Administered Medications   Medication Dose Route Frequency    mylanta/viscous lidocaine (GI COCKTAIL)  40 mL Oral ONCE    0.9% sodium chloride infusion 250 mL  250 mL IntraVENous PRN    pantoprazole (PROTONIX) tablet 40 mg  40 mg Oral ACB&D    erythromycin (ERYC) capsule 250 mg  250 mg Oral TIDAC    epoetin charlie-epbx (RETACRIT) injection 10,000 Units  10,000 Units IntraVENous DIALYSIS TUE, THU & SAT    bisacodyL (DULCOLAX) tablet 5 mg  5 mg Oral Q12H    polyethylene glycol (MIRALAX) packet 17 g  17 g Oral Q12H    senna-docusate (PERICOLACE) 8.6-50 mg per tablet 1 Tablet  1 Tablet Oral Q12H    Warfarin Pharmacy to dose   Other Rx Dosing/Monitoring    ondansetron (ZOFRAN) injection 8 mg  8 mg IntraVENous Q6H    gentamicin (GARAMYCIN) 0.1 % ointment   Topical DAILY    gentamicin (GARAMYCIN) 0.1 % cream   Topical DIALYSIS PRN    nitroglycerin (NITROSTAT) tablet 0.4 mg  0.4 mg SubLINGual Q5MIN PRN    sodium chloride (NS) flush 5-40 mL  5-40 mL IntraVENous Q8H    sodium chloride (NS) flush 5-40 mL  5-40 mL IntraVENous PRN    acetaminophen (TYLENOL) tablet 650 mg  650 mg Oral Q6H PRN    Or    acetaminophen (TYLENOL) suppository 650 mg  650 mg Rectal Q6H PRN    albuterol-ipratropium (DUO-NEB) 2.5 MG-0.5 MG/3 ML  3 mL Nebulization Q6H PRN    allopurinoL (ZYLOPRIM) tablet 100 mg  100 mg Oral DAILY    atorvastatin (LIPITOR) tablet 80 mg  80 mg Oral QHS    calcitRIOL (ROCALTROL) capsule 0.5 mcg  0.5 mcg Oral DAILY    folic acid (FOLVITE) tablet 1 mg  1 mg Oral DAILY    midodrine (PROAMATINE) tablet 2.5 mg  2.5 mg Oral BID    potassium chloride SR (KLOR-CON 10) tablet 20 mEq  20 mEq Oral DAILY    sucralfate (CARAFATE) tablet 1 g  1 g Oral AC&HS     Allergies  Allergies   Allergen Reactions    Contrast Dye [Iodine] Anaphylaxis     Tolerates when pre medicated w/ IV solumedrol and benadryl prior to procedure     Shellfish Derived Anaphylaxis    Levaquin [Levofloxacin] Nausea and Vomiting    Morphine Hives and Itching    Nsaids (Non-Steroidal Anti-Inflammatory Drug) Nausea and Vomiting     CBC:  Recent Labs     09/07/22  0242 09/06/22  0845 09/05/22  0315   WBC 10.9 9.0 10.3   HGB 7.8* 6.8* 7.0*    185 702       Metabolic Panel:  Recent Labs     09/07/22  0242 09/06/22  0845 09/05/22  0315    134* 134*   K 4.1 4.1 3.5    100 98   CO2 26 25 27   BUN 30* 54* 50*   CREA 7.41* 11.10* 10.30*   GLU 86 87 93   CA 9.0 8.5 8.8   INR 2.8* 1.8* 1.3*       Imaging/procedures:   CT ABD PELV WO CONT    Result Date: 9/1/2022  1. Increasing moderate volume ascites. Right abdominal peritoneal catheter. 2.  Grossly unchanged right mid renal indeterminate lesion in several additional bilateral likely cystic lesions as described above. 3.  Bilateral nonobstructive nephrolithiasis. 4.  Cholecystectomy. 5.  Evidence of chronic interstitial lung disease. XR CHEST PORT    Result Date: 9/5/2022  1. No interval change in bilateral interstitial and alveolar opacities           Assessment and Plan     Melissa Wynne is a 61 y.o. female with PMH ESRD on PD, CAD with MI 2012, HFpEF, ILD on 2L home O2, JASEN, GERD, h/o DVT in RLE on warfarin, HTN, HLD, admitted with intractable n/v and epigastric pain. Intractable nausea and vomiting with epigastric abd pain: CT AP showed increasing moderate volume ascites, unchanged b/l nonobstructive nephrolithiasis.  Possibly 2/2 gastroparesis vs CHF vs uremia. Peritoneal fluid cx no growth. GI no need for endoscopy. -Zofran 8mg q6h  -Protonix  -GI consulted, appreciate recommendations  -Nephrology consulted, appreciate recommendations  -Miralax daily   -Erythromycin  -GI cocktail    Anemia: Improved x/p 1u pRBC on 9/6. Hb 6.8-->7. 8(BL 10). - Monitor for symptoms, signs of bleeding  - Monitor CBC  - ALDO per nephro     Chronic hypokalemia: Baseline 3.3. On KCl 20 meq daily at home. Likely 2/2 continued n/v. EKG wnl.  -Continue home KCl 20 meq daily   -Monitor BMP    Supratherapeutic INR on warfarin: (Resolved) INR 13.5 on 9/3, no longer elevated. -Home warfarin, pharmacy to dose  -PT/INR daily     Hypotension in the setting of HTN: recently taken off of home carvedilol 6.25 mg and Imdur 120 mg CR by her PCP due to low BPs. Also started on midodrine 2.5 mg BID. -Hold home carvedilol 6.25 mg and Imdur 120 mg CR  -Continue midodrine 2.5 mg BID  -Will continue to monitor at this time and readjust as BP's trend. ESRD on Dialysis:  Follows with Dr Michael Santos (nephrology). -Consult nephrology, appreciate recommendations  - Per pt preference, switched from PD to HD, TTS  -Avoid Nephrotoxins  -Renally dose medications  -Continue Calcitriol 0.5mg daily     Coronary artery disease with history of MI: 1 SAL to OM 2004, MI in 2012 s/p 2 SAL to OM2/OM3. Has chronic proximal occlusion with L to R collaterals. Follows with Jaki Briggs (cardiologist). Echo with EF 60-65%. Worsening CP and tachycardia 9/5, but EKG, trop, CXR wnl.  - Remote tele  - Cont home lipitor 80mg QHS     Chronic HFpEF: Per chart review last echo in 2019 (EF 66-70%). Pt reports recent admission to outside hospital for diuresis. Echo EF 60-65%. -Monitor for signs/symptoms of HF     Interstitial lung disease: With chronic hypoxia on 2 L oxygen via nasal cannula at home. Biopsy negative for sarcoid per patient. Attributed to smoking history.    - Duonebs prn  - Humidified air  - Home 2L O2     Chronic sacral ulcer: painful.   -Consult inpatient wound care     T2DM with CKD: Last HgA1C 4.7 (3/25/22). Not on any medications currently. - Monitor daily labs     GERD Stable. -Continue home sucralfate   -Protonix 40 IV daily     Hx of DVT: Of right leg. No recurrence. Home warfarin 1 mg MWF with 2.5 mg Tue Thur Sat.   -Home warfarin, pharmacy to dose  -PT/INR daily     History of gout: Takes allopurinol 100 mg daily. Has not had a recent flare.   -Continue home allopurinol 100 mg daily     Intermittent constipation: Takes docusate sodium daily. -MiraLAX PRN     Hyperlipidemia: Total 100, TG 78, HDL 51, LDL 33.4.   -Continue home Lipitor 80 mg nightly    Obstructive sleep apnea: Uses CPAP nightly, well-controlled at this time. Former smoker: 0.5 PPD for 41 years for 20.5-pack-year history. Quit on 11/9/2014. Obesity: PT with BMI 38.05 kg/m². - Encouraging lifestyle modifications and further follow up outpatient.     Patient discussed with Latasha Benito MD  [facility] Medicine Resident       For Billing    Chief Complaint   Patient presents with    Nausea    Vomiting    Fatigue       Hospital Problems  Date Reviewed: 9/2/2022            Codes Class Noted POA    * (Principal) Intractable vomiting with nausea ICD-10-CM: R11.2  ICD-9-CM: 536.2  9/2/2022 Yes        Hypokalemia ICD-10-CM: E87.6  ICD-9-CM: 276.8  7/23/2022 Yes        Supratherapeutic INR ICD-10-CM: R79.1  ICD-9-CM: 790.92  9/2/2022 Yes        Hyponatremia ICD-10-CM: E87.1  ICD-9-CM: 276.1  9/2/2022 Yes        Abdominal pain ICD-10-CM: R10.9  ICD-9-CM: 789.00  7/23/2022 Yes        (HFpEF) heart failure with preserved ejection fraction (HCC) ICD-10-CM: I50.30  ICD-9-CM: 428.9  9/23/2018 Yes        ESRD on peritoneal dialysis [facility]) ICD-10-CM: N18.6, Z99.2  ICD-9-CM: 585.6, V45.11  6/23/2018 Yes        Hx of deep venous thrombosis ICD-10-CM: Z86.718  ICD-9-CM: V12.51  5/30/2018 Yes        Diabetic gastroparesis ([facility] [address] ICD-10-CM: E11.43, K31.84  ICD-9-CM: 250.60, 536.3  Unknown Yes        GERD (gastroesophageal reflux disease) ICD-10-CM: K21.9  ICD-9-CM: 530.81  Unknown Yes        DM type 2 causing neurological disease ([facility] ICD-10-CM: E11.49  ICD-9-CM: 250.60  Unknown Yes        Gout ICD-10-CM: M10.9  ICD-9-CM: 274.9  Unknown Yes        ILD (interstitial lung disease) ([facility] ICD-10-CM: J84.9  ICD-9-CM: 789  8/20/2017 Yes        Warfarin anticoagulation ICD-10-CM: Z79.01  ICD-9-CM: V58.61  8/20/2017 Yes        HTN (hypertension) (Chronic) ICD-10-CM: I10  ICD-9-CM: 401.9  10/1/2012 Yes        Coronary artery disease (Chronic) ICD-10-CM: I25.10  ICD-9-CM: 414.00  2/16/2011 Yes    Overview Addendum 10/5/2012  7:33 AM by Leeroy Crystal, [address] 2004  1v CAD by cath - PCI OM with SAL  Cath 5/2004 - patent stent, no new disease  Lexiscan cardiolite 12/09- normal perfusion, EF 66%  Cath: 3/19/12: PA 55/30 mean 41, wedge 26, RA 24, EDP 35, LVG 55%, LM normal, LAD normal, LCX - OM1 patent stent, OM2 prox 85% long, % with L to R collaterals                JASEN on CPAP (Chronic) ICD-10-CM: G47.33, Z99.89  ICD-9-CM: 327.23, V46.8  2/16/2011 Yes    Overview Signed 3/21/2012  8:04 AM by Nico PAIZ

## 2022-09-08 ENCOUNTER — APPOINTMENT (OUTPATIENT)
Dept: GENERAL RADIOLOGY | Age: 67
DRG: 073 | End: 2022-09-08
Attending: FAMILY MEDICINE
Payer: MEDICARE

## 2022-09-08 ENCOUNTER — APPOINTMENT (OUTPATIENT)
Dept: VASCULAR SURGERY | Age: 67
DRG: 073 | End: 2022-09-08
Attending: STUDENT IN AN ORGANIZED HEALTH CARE EDUCATION/TRAINING PROGRAM
Payer: MEDICARE

## 2022-09-08 LAB
25(OH)D3 SERPL-MCNC: 43.6 NG/ML (ref 30–100)
ALBUMIN SERPL-MCNC: 2.3 G/DL (ref 3.5–5)
ALBUMIN/GLOB SERPL: 0.7 {RATIO} (ref 1.1–2.2)
ALP SERPL-CCNC: 99 U/L (ref 45–117)
ALT SERPL-CCNC: 15 U/L (ref 12–78)
ANION GAP SERPL CALC-SCNC: 5 MMOL/L (ref 5–15)
ANION GAP SERPL CALC-SCNC: 8 MMOL/L (ref 5–15)
AST SERPL-CCNC: 13 U/L (ref 15–37)
ATRIAL RATE: 97 BPM
BASOPHILS # BLD: 0.1 K/UL (ref 0–0.1)
BASOPHILS NFR BLD: 1 % (ref 0–1)
BILIRUB SERPL-MCNC: 0.5 MG/DL (ref 0.2–1)
BUN SERPL-MCNC: 15 MG/DL (ref 6–20)
BUN SERPL-MCNC: 33 MG/DL (ref 6–20)
BUN/CREAT SERPL: 3 (ref 12–20)
BUN/CREAT SERPL: 4 (ref 12–20)
CALCIUM SERPL-MCNC: 9.2 MG/DL (ref 8.5–10.1)
CALCIUM SERPL-MCNC: 9.3 MG/DL (ref 8.5–10.1)
CALCIUM SERPL-MCNC: 9.4 MG/DL (ref 8.5–10.1)
CALCULATED P AXIS, ECG09: 46 DEGREES
CALCULATED R AXIS, ECG10: 14 DEGREES
CALCULATED T AXIS, ECG11: 48 DEGREES
CHLORIDE SERPL-SCNC: 102 MMOL/L (ref 97–108)
CHLORIDE SERPL-SCNC: 103 MMOL/L (ref 97–108)
CK SERPL-CCNC: 28 U/L (ref 26–192)
CO2 SERPL-SCNC: 25 MMOL/L (ref 21–32)
CO2 SERPL-SCNC: 31 MMOL/L (ref 21–32)
CREAT SERPL-MCNC: 5.29 MG/DL (ref 0.55–1.02)
CREAT SERPL-MCNC: 8.74 MG/DL (ref 0.55–1.02)
DIAGNOSIS, 93000: NORMAL
DIFFERENTIAL METHOD BLD: ABNORMAL
EOSINOPHIL # BLD: 0.8 K/UL (ref 0–0.4)
EOSINOPHIL NFR BLD: 6 % (ref 0–7)
ERYTHROCYTE [DISTWIDTH] IN BLOOD BY AUTOMATED COUNT: 16.1 % (ref 11.5–14.5)
GLOBULIN SER CALC-MCNC: 3.3 G/DL (ref 2–4)
GLUCOSE SERPL-MCNC: 113 MG/DL (ref 65–100)
GLUCOSE SERPL-MCNC: 91 MG/DL (ref 65–100)
HCT VFR BLD AUTO: 25.6 % (ref 35–47)
HGB BLD-MCNC: 8.2 G/DL (ref 11.5–16)
IMM GRANULOCYTES # BLD AUTO: 0.1 K/UL (ref 0–0.04)
IMM GRANULOCYTES NFR BLD AUTO: 1 % (ref 0–0.5)
INR PPP: 3.8 (ref 0.9–1.1)
LIPASE SERPL-CCNC: 391 U/L (ref 73–393)
LYMPHOCYTES # BLD: 2.8 K/UL (ref 0.8–3.5)
LYMPHOCYTES NFR BLD: 24 % (ref 12–49)
MAGNESIUM SERPL-MCNC: 1.6 MG/DL (ref 1.6–2.4)
MAGNESIUM SERPL-MCNC: 1.6 MG/DL (ref 1.6–2.4)
MCH RBC QN AUTO: 31.1 PG (ref 26–34)
MCHC RBC AUTO-ENTMCNC: 32 G/DL (ref 30–36.5)
MCV RBC AUTO: 97 FL (ref 80–99)
MONOCYTES # BLD: 0.9 K/UL (ref 0–1)
MONOCYTES NFR BLD: 7 % (ref 5–13)
NEUTS SEG # BLD: 7.3 K/UL (ref 1.8–8)
NEUTS SEG NFR BLD: 61 % (ref 32–75)
NRBC # BLD: 0 K/UL (ref 0–0.01)
NRBC BLD-RTO: 0 PER 100 WBC
P-R INTERVAL, ECG05: 202 MS
PHOSPHATE SERPL-MCNC: 2.6 MG/DL (ref 2.6–4.7)
PLATELET # BLD AUTO: 237 K/UL (ref 150–400)
PMV BLD AUTO: 10.9 FL (ref 8.9–12.9)
POTASSIUM SERPL-SCNC: 3.5 MMOL/L (ref 3.5–5.1)
POTASSIUM SERPL-SCNC: 3.9 MMOL/L (ref 3.5–5.1)
PROT SERPL-MCNC: 5.6 G/DL (ref 6.4–8.2)
PROTHROMBIN TIME: 36.7 SEC (ref 9–11.1)
PTH-INTACT SERPL-MCNC: 225.3 PG/ML (ref 18.4–88)
Q-T INTERVAL, ECG07: 366 MS
QRS DURATION, ECG06: 80 MS
QTC CALCULATION (BEZET), ECG08: 464 MS
RBC # BLD AUTO: 2.64 M/UL (ref 3.8–5.2)
SODIUM SERPL-SCNC: 136 MMOL/L (ref 136–145)
SODIUM SERPL-SCNC: 138 MMOL/L (ref 136–145)
TROPONIN-HIGH SENSITIVITY: 32 NG/L (ref 0–51)
TROPONIN-HIGH SENSITIVITY: 32 NG/L (ref 0–51)
VENTRICULAR RATE, ECG03: 97 BPM
WBC # BLD AUTO: 11.8 K/UL (ref 3.6–11)

## 2022-09-08 PROCEDURE — 85610 PROTHROMBIN TIME: CPT

## 2022-09-08 PROCEDURE — 74011000250 HC RX REV CODE- 250: Performed by: STUDENT IN AN ORGANIZED HEALTH CARE EDUCATION/TRAINING PROGRAM

## 2022-09-08 PROCEDURE — 74011250637 HC RX REV CODE- 250/637: Performed by: NURSE PRACTITIONER

## 2022-09-08 PROCEDURE — 82525 ASSAY OF COPPER: CPT

## 2022-09-08 PROCEDURE — APPSS30 APP SPLIT SHARED TIME 16-30 MINUTES: Performed by: NURSE PRACTITIONER

## 2022-09-08 PROCEDURE — 94760 N-INVAS EAR/PLS OXIMETRY 1: CPT

## 2022-09-08 PROCEDURE — 94761 N-INVAS EAR/PLS OXIMETRY MLT: CPT

## 2022-09-08 PROCEDURE — 84207 ASSAY OF VITAMIN B-6: CPT

## 2022-09-08 PROCEDURE — 93005 ELECTROCARDIOGRAM TRACING: CPT

## 2022-09-08 PROCEDURE — 97165 OT EVAL LOW COMPLEX 30 MIN: CPT

## 2022-09-08 PROCEDURE — 74011250637 HC RX REV CODE- 250/637: Performed by: STUDENT IN AN ORGANIZED HEALTH CARE EDUCATION/TRAINING PROGRAM

## 2022-09-08 PROCEDURE — 97116 GAIT TRAINING THERAPY: CPT

## 2022-09-08 PROCEDURE — 83735 ASSAY OF MAGNESIUM: CPT

## 2022-09-08 PROCEDURE — 82306 VITAMIN D 25 HYDROXY: CPT

## 2022-09-08 PROCEDURE — 85025 COMPLETE CBC W/AUTO DIFF WBC: CPT

## 2022-09-08 PROCEDURE — 71045 X-RAY EXAM CHEST 1 VIEW: CPT

## 2022-09-08 PROCEDURE — 82550 ASSAY OF CK (CPK): CPT

## 2022-09-08 PROCEDURE — 83690 ASSAY OF LIPASE: CPT

## 2022-09-08 PROCEDURE — 93971 EXTREMITY STUDY: CPT

## 2022-09-08 PROCEDURE — 84100 ASSAY OF PHOSPHORUS: CPT

## 2022-09-08 PROCEDURE — 84630 ASSAY OF ZINC: CPT

## 2022-09-08 PROCEDURE — 74011250636 HC RX REV CODE- 250/636: Performed by: INTERNAL MEDICINE

## 2022-09-08 PROCEDURE — 74011250637 HC RX REV CODE- 250/637

## 2022-09-08 PROCEDURE — 97161 PT EVAL LOW COMPLEX 20 MIN: CPT

## 2022-09-08 PROCEDURE — 99223 1ST HOSP IP/OBS HIGH 75: CPT | Performed by: INTERNAL MEDICINE

## 2022-09-08 PROCEDURE — 99233 SBSQ HOSP IP/OBS HIGH 50: CPT | Performed by: FAMILY MEDICINE

## 2022-09-08 PROCEDURE — 80053 COMPREHEN METABOLIC PANEL: CPT

## 2022-09-08 PROCEDURE — 36415 COLL VENOUS BLD VENIPUNCTURE: CPT

## 2022-09-08 PROCEDURE — 77010033678 HC OXYGEN DAILY

## 2022-09-08 PROCEDURE — 74011250636 HC RX REV CODE- 250/636: Performed by: STUDENT IN AN ORGANIZED HEALTH CARE EDUCATION/TRAINING PROGRAM

## 2022-09-08 PROCEDURE — 74011000250 HC RX REV CODE- 250

## 2022-09-08 PROCEDURE — 83970 ASSAY OF PARATHORMONE: CPT

## 2022-09-08 PROCEDURE — 97535 SELF CARE MNGMENT TRAINING: CPT

## 2022-09-08 PROCEDURE — 84425 ASSAY OF VITAMIN B-1: CPT

## 2022-09-08 PROCEDURE — 65270000029 HC RM PRIVATE

## 2022-09-08 PROCEDURE — 84484 ASSAY OF TROPONIN QUANT: CPT

## 2022-09-08 RX ORDER — PANTOPRAZOLE SODIUM 40 MG/1
40 TABLET, DELAYED RELEASE ORAL
Qty: 180 TABLET | Refills: 0 | Status: SHIPPED | OUTPATIENT
Start: 2022-09-08

## 2022-09-08 RX ORDER — CARVEDILOL 3.12 MG/1
3.12 TABLET ORAL 2 TIMES DAILY WITH MEALS
Status: DISCONTINUED | OUTPATIENT
Start: 2022-09-08 | End: 2022-09-10 | Stop reason: HOSPADM

## 2022-09-08 RX ORDER — ERYTHROMYCIN 250 MG/1
250 CAPSULE, DELAYED RELEASE ORAL
Qty: 90 CAPSULE | Refills: 0 | Status: SHIPPED | OUTPATIENT
Start: 2022-09-08

## 2022-09-08 RX ORDER — ISOSORBIDE MONONITRATE 30 MG/1
30 TABLET, EXTENDED RELEASE ORAL DAILY
Status: DISCONTINUED | OUTPATIENT
Start: 2022-09-08 | End: 2022-09-10 | Stop reason: HOSPADM

## 2022-09-08 RX ORDER — DICLOFENAC SODIUM 10 MG/G
2 GEL TOPICAL 4 TIMES DAILY
Status: DISCONTINUED | OUTPATIENT
Start: 2022-09-08 | End: 2022-09-10 | Stop reason: HOSPADM

## 2022-09-08 RX ADMIN — ONDANSETRON 8 MG: 2 INJECTION INTRAMUSCULAR; INTRAVENOUS at 12:18

## 2022-09-08 RX ADMIN — POLYETHYLENE GLYCOL 3350 17 G: 17 POWDER, FOR SOLUTION ORAL at 10:04

## 2022-09-08 RX ADMIN — Medication 10 ML: at 14:16

## 2022-09-08 RX ADMIN — DICLOFENAC SODIUM 2 G: 10 GEL TOPICAL at 23:28

## 2022-09-08 RX ADMIN — ISOSORBIDE MONONITRATE 30 MG: 30 TABLET, EXTENDED RELEASE ORAL at 17:31

## 2022-09-08 RX ADMIN — ONDANSETRON 8 MG: 2 INJECTION INTRAMUSCULAR; INTRAVENOUS at 17:31

## 2022-09-08 RX ADMIN — ACETAMINOPHEN 650 MG: 325 TABLET, FILM COATED ORAL at 11:01

## 2022-09-08 RX ADMIN — ALUMINUM HYDROXIDE, MAGNESIUM HYDROXIDE, AND SIMETHICONE 40 ML: 200; 200; 20 SUSPENSION ORAL at 10:14

## 2022-09-08 RX ADMIN — ACETAMINOPHEN 650 MG: 325 TABLET, FILM COATED ORAL at 17:28

## 2022-09-08 RX ADMIN — SUCRALFATE 1 G: 1 TABLET ORAL at 21:35

## 2022-09-08 RX ADMIN — Medication 10 ML: at 06:16

## 2022-09-08 RX ADMIN — FOLIC ACID 1 MG: 1 TABLET ORAL at 10:03

## 2022-09-08 RX ADMIN — ALLOPURINOL 100 MG: 100 TABLET ORAL at 10:17

## 2022-09-08 RX ADMIN — NITROGLYCERIN 0.4 MG: 0.4 TABLET, ORALLY DISINTEGRATING SUBLINGUAL at 08:29

## 2022-09-08 RX ADMIN — ERYTHROMYCIN 250 MG: 250 CAPSULE, DELAYED RELEASE PELLETS ORAL at 10:57

## 2022-09-08 RX ADMIN — NITROGLYCERIN 0.4 MG: 0.4 TABLET, ORALLY DISINTEGRATING SUBLINGUAL at 08:34

## 2022-09-08 RX ADMIN — POTASSIUM CHLORIDE 20 MEQ: 750 TABLET, FILM COATED, EXTENDED RELEASE ORAL at 10:03

## 2022-09-08 RX ADMIN — PANTOPRAZOLE SODIUM 40 MG: 40 TABLET, DELAYED RELEASE ORAL at 10:03

## 2022-09-08 RX ADMIN — ATORVASTATIN CALCIUM 80 MG: 20 TABLET, FILM COATED ORAL at 21:35

## 2022-09-08 RX ADMIN — PANTOPRAZOLE SODIUM 40 MG: 40 TABLET, DELAYED RELEASE ORAL at 17:32

## 2022-09-08 RX ADMIN — SUCRALFATE 1 G: 1 TABLET ORAL at 17:28

## 2022-09-08 RX ADMIN — ONDANSETRON 8 MG: 2 INJECTION INTRAMUSCULAR; INTRAVENOUS at 23:29

## 2022-09-08 RX ADMIN — CALCITRIOL CAPSULES 0.25 MCG 0.5 MCG: 0.25 CAPSULE ORAL at 10:13

## 2022-09-08 RX ADMIN — ERYTHROMYCIN 250 MG: 250 CAPSULE, DELAYED RELEASE PELLETS ORAL at 17:38

## 2022-09-08 RX ADMIN — DOCUSATE SODIUM 50MG AND SENNOSIDES 8.6MG 1 TABLET: 8.6; 5 TABLET, FILM COATED ORAL at 10:02

## 2022-09-08 RX ADMIN — BISACODYL 5 MG: 5 TABLET, DELAYED RELEASE ORAL at 10:03

## 2022-09-08 RX ADMIN — CARVEDILOL 3.12 MG: 3.12 TABLET, FILM COATED ORAL at 17:31

## 2022-09-08 RX ADMIN — DOCUSATE SODIUM 50MG AND SENNOSIDES 8.6MG 1 TABLET: 8.6; 5 TABLET, FILM COATED ORAL at 21:35

## 2022-09-08 RX ADMIN — BISACODYL 5 MG: 5 TABLET, DELAYED RELEASE ORAL at 21:35

## 2022-09-08 RX ADMIN — Medication 5 ML: at 21:35

## 2022-09-08 RX ADMIN — ONDANSETRON 8 MG: 2 INJECTION INTRAMUSCULAR; INTRAVENOUS at 06:14

## 2022-09-08 RX ADMIN — EPOETIN ALFA-EPBX 10000 UNITS: 10000 INJECTION, SOLUTION INTRAVENOUS; SUBCUTANEOUS at 10:17

## 2022-09-08 RX ADMIN — MIDODRINE HYDROCHLORIDE 2.5 MG: 5 TABLET ORAL at 17:31

## 2022-09-08 RX ADMIN — NITROGLYCERIN 0.4 MG: 0.4 TABLET, ORALLY DISINTEGRATING SUBLINGUAL at 08:40

## 2022-09-08 NOTE — PROGRESS NOTES
Problem: Mobility Impaired (Adult and Pediatric)  Goal: *Acute Goals and Plan of Care (Insert Text)  Description: FUNCTIONAL STATUS PRIOR TO ADMISSION: Patient was modified independent using a rollator and single point cane for functional mobility. HOME SUPPORT PRIOR TO ADMISSION: The patient lived with  who assists when needed    Physical Therapy Goals  Initiated 9/8/2022  1. Patient will move from supine to sit and sit to supine  in bed with independence within 7 day(s). 2.  Patient will transfer from bed to chair and chair to bed with independence using the least restrictive device within 7 day(s). 3.  Patient will perform sit to stand with independence within 7 day(s). 4.  Patient will ambulate with independence for 50 feet with the least restrictive device within 7 day(s). 9/8/2022 1300 by Uzma Guo, PT  Outcome: Progressing Towards Goal  9/8/2022 1257 by Uzma Guo, PT  Outcome: Progressing Towards Goal   PHYSICAL THERAPY EVALUATION  Patient: Jony Watson (32 y.o. female)  Date: 9/8/2022  Primary Diagnosis: Abdominal pain [R10.9]       Precautions: Universal       ASSESSMENT  Based on the objective data described below, the patient presents with decreased strength, decreased balance, assistance needed with mobility. The patient was admitted with abdominal pain and vomiting. She has HD Tu, Th, Sa, last dialysis was this morning. The patient reports that she needs a knee replacement but has to wait until her Hgb increases (currently 8.2 g/dL). She has a cane, rollator and walker at home and lives with her . Performed transfer training for out of bed, she requires assist with right leg due to hip pain. Performed transfer training for sit <> stand from bed height and from toilet using grab bars for assist.  Performed gait training to/from the bathroom with her cane and assisted patient to bedside chair.     At rest HR is 115 (RN and MD aware), with walking to bathroom HR increases to 140 bpm, briefly, then decreases quickly to 120s. Current Level of Function Impacting Discharge (mobility/balance): min assist to CG with all mobility    Functional Outcome Measure: The patient scored 60 out of 100 on the Barthel outcome measure     Other factors to consider for discharge: lives with , had home O2, has had recent fall, activity limited by elevated HR     Patient will benefit from skilled therapy intervention to address the above noted impairments. PLAN :  Recommendations and Planned Interventions: bed mobility training, transfer training, gait training, and therapeutic exercises      Frequency/Duration: Patient will be followed by physical therapy:  3 times a week to address goals. Recommendation for discharge: (in order for the patient to meet his/her long term goals)  Physical therapy at least 2 days/week in the home     This discharge recommendation:  Has not yet been discussed the attending provider and/or case management    IF patient discharges home will need the following DME: patient owns DME required for discharge         SUBJECTIVE:   Patient stated I need to use the bathroom.     OBJECTIVE DATA SUMMARY:   HISTORY:    Past Medical History:   Diagnosis Date     Sleep Apnea 2/16/2011    Compliant with c-pap.     Aortic aneurysm (HCC)     Abdominal    CAD (coronary Artery Disease) Native Artery 2/16/2011    Chronic kidney disease     Hemodiaylsis  3x/week    Chronic pain     CKD (chronic kidney disease) stage 4, GFR 15-29 ml/min [facility]) 2/10/2017    Diabetic gastroparesis (HCC)     Diastolic heart failure ([address] 10/5/2012    DM type 2 causing neurological disease ([address]     DM type 2 causing renal disease (HCC)     Insulin SS at night only PRN    DM type 2 causing vascular disease ([address]     Esophageal stricture 2012    dialted Dr. Alexandrea Sandoval    G6PD deficiency     trait    GERD (gastroesophageal reflux disease)     Gout     Heart failure ([address] Hemodialysis access, AV graft [facility] 04/02/2017    Dialysis MWF    [facility] [address] Hypertension     ILD (interstitial lung disease) ([facility]     open lung bx CJW 2010    Infected dental caries 3/17/2020    Infection of total right knee replacement ([facility] 3/17/2020    Loss of appetite 3/17/2020    Morbid obesity ([facility]     OA (osteoarthritis)     Ovarian cancer ([facility]     cervical and uterine    Rheumatoid arteritis (HCC)     S/P left pulmonary artery pressure sensor implant placement 8/31/2017    Cardiomems     Thromboembolus ([facility]     Right calf     Tobacco use disorder 3/21/2012    Uterine cervix cancer ([facility]     Vitamin D deficiency 8/9/2013     Past Surgical History:   Procedure Laterality Date    COLONOSCOPY N/A 6/24/2016    COLONOSCOPY performed by Mignon Quarles MD at [facility].      bilateral    HX CHOLECYSTECTOMY      HX HEART CATHETERIZATION      x 7    HX HERNIA REPAIR      HX HYSTERECTOMY      HX ORTHOPAEDIC Right 11/12/12    right knee replacement    HX ORTHOPAEDIC Bilateral     carpal tunnel repair    HX ORTHOPAEDIC Right     PLATE IN ANKLE DUE TO FRACTURE    HX SALPINGO-OOPHORECTOMY      HX TONSIL AND ADENOIDECTOMY      HX TONSILLECTOMY      HX VASCULAR ACCESS Left     Left lower arm AV fistula    IR INSERT PERITONEAL VENOUS SHUNT      OR CARDIAC SURG PROCEDURE UNLIST  02/2019    x4 with last sttent placed 2/2019.     OR CHEST SURGERY PROCEDURE UNLISTED Right     > 20 years ago  RLL removed     UPPER GI ENDOSCOPY,VINOD W GUIDE  6/24/2016            Personal factors and/or comorbidities impacting plan of care: recent falls    Home Situation  Living Alone: No  Support Systems: Spouse/Significant Other  Patient Expects to be Discharged to[de-identified] Home with home health  Current DME Used/Available at Home: rashmi Montejo, Andrei Solitario, rollator, Walker, rolling    EXAMINATION/PRESENTATION/DECISION MAKING:   Critical Behavior: Hearing: Auditory  Auditory Impairment: None    Range Of Motion:  AROM: Generally decreased, functional           PROM: Generally decreased, functional           Strength:    Strength: Generally decreased, functional                    Tone & Sensation:   Tone: Normal                              Coordination:  Coordination: Within functional limits  Vision:      Functional Mobility:  Bed Mobility:  Rolling: Independent  Supine to Sit: Minimum assistance;Assist x1 (assist with right leg due to hip pain)        Transfers:  Sit to Stand: Contact guard assistance;Assist x1  Stand to Sit: Contact guard assistance;Assist x1;Additional time                       Balance:   Sitting: Intact  Standing: Intact; With support  Ambulation/Gait Training:  Distance (ft): 15 Feet (ft) (twice)  Assistive Device: Cane, straight;Gait belt  Ambulation - Level of Assistance: Stand-by assistance                                             Functional Measure:  Barthel Index:    Bathin  Bladder: 10  Bowels: 10  Groomin  Dressin  Feeding: 10  Mobility: 0  Stairs: 0  Toilet Use: 10  Transfer (Bed to Chair and Back): 10  Total: 60/100       The Barthel ADL Index: Guidelines  1. The index should be used as a record of what a patient does, not as a record of what a patient could do. 2. The main aim is to establish degree of independence from any help, physical or verbal, however minor and for whatever reason. 3. The need for supervision renders the patient not independent. 4. A patient's performance should be established using the best available evidence. Asking the patient, friends/relatives and nurses are the usual sources, but direct observation and common sense are also important. However direct testing is not needed. 5. Usually the patient's performance over the preceding 24-48 hours is important, but occasionally longer periods will be relevant.   6. Middle categories imply that the patient supplies over 50 per cent of the effort. 7. Use of aids to be independent is allowed. Score Interpretation (from [facility])    Independent   60-79 Minimally independent   40-59 Partially dependent   20-39 Very dependent   <20 Totally dependent     -Ashleigh Dumont., Barthel, D.W. (1965). Functional evaluation: the Barthel Index. [address] ([address] (1997). The Barthel activities of daily living index: self-reporting versus actual performance in the old (> or = 75 years). Journal of [address] [place] [address] 45(7), [facility], J..VADIM., [facility]., Angeline Zamora. (1999). Measuring the change in disability after inpatient rehabilitation; comparison of the responsiveness of the Barthel Index and Functional Johnson Measure. Journal of Neurology, Neurosurgery, and Psychiatry, 66(4), 939-155. Lon Reyes, N.J.A, JENIFFER Smith, & Wilfred Hall MLISA. (2004) Assessment of post-stroke quality of life in cost-effectiveness studies: The usefulness of the Barthel Index and the EuroQoL-5D.  Quality of Life Research, 15, 654-79           Physical Therapy Evaluation Charge Determination   History Examination Presentation Decision-Making   LOW Complexity : Zero comorbidities / personal factors that will impact the outcome / POC LOW Complexity : 1-2 Standardized tests and measures addressing body structure, function, activity limitation and / or participation in recreation  LOW Complexity : Stable, uncomplicated  LOW Complexity : FOTO score of       Based on the above components, the patient evaluation is determined to be of the following complexity level: LOW     Pain Rating:  None reported    Activity Tolerance:   Increased HR with all activity    After treatment patient left in no apparent distress:   Sitting in chair, Call bell within reach, and OT working with patient    COMMUNICATION/EDUCATION:   The patients plan of care was discussed with: Occupational therapist, Registered nurse, and Physician. Fall prevention education was provided and the patient/caregiver indicated understanding., Patient/family have participated as able in goal setting and plan of care. , and Patient/family agree to work toward stated goals and plan of care.     Thank you for this referral.  Sean Mena, PT   Time Calculation: 25 mins

## 2022-09-08 NOTE — PROGRESS NOTES
Bedside shift change report given to [address] (oncoming nurse) by Sumeet Richardson (offgoing nurse). Report included the following information SBAR, Kardex, Intake/Output, MAR, and Recent Results.

## 2022-09-08 NOTE — PROGRESS NOTES
[address] [place] [address] with [facility] and [facility]     Rapid Response note      A RRT was was called for CP at 9:50am as patient was finishing her HD (completed). Patient seen and examined at bedside. Patient is awake and alert x3. She reports feeling 10/10 CP that is located around the epigastrium and under her left breast, radiating to her back. She has had similar symptoms in the past and during this admission. She reports continued generalized fatigue and denies SOB or any other complaints. She reports improved pain after receiving nitrostat 0.4mg x3. Vitals remarkable for tachycardia to 128. CP reproducible with palpation in the epigastric region. EKG with Sinus tachycardia w/o any ischemic changes. Troponin and CXR ordered.    - Will follow up on Troponin and CXR  - Pain improved with nitrostat; has PRN orders in place  - Ordered GI cocktail   - Will continue to monitor       Visit Vitals  /63   Pulse (!) 128   Temp 98.6 °F (37 °C)   Resp 16   Ht 5' 10\" (1.778 m)   Wt 271 lb 4.8 oz (123.1 kg)   SpO2 97%   BMI 38.93 kg/m²       Physical Examination  General appearance - alert, chronically ill-appearing, and in no distress  Lung - clear to auscultation, no wheezes, rales or rhonchi, symmetric air entry  Heart - Tachycardic, regular rhythm  Chest - tender to palpation in the epigastrium  Abdomen - soft, nontender, nondistended, no masses or organomegaly  Neurological - alert, oriented, normal speech, no focal findings      Casey Carrillo MD  Family Medicine Resident

## 2022-09-08 NOTE — PROGRESS NOTES
Reviewed chart and spoke with nurse. Patient currently receiving hemodialysis.   Will continue to attempt Physical Therapy Evaluation

## 2022-09-08 NOTE — PROGRESS NOTES
Responded to RRT for Chest Pain    Provider at bedside: yes    Interventions ordered: STAT Chest XR and Labs    Sepsis Suspected: no    Transfer to Higher Level of Care: no       Visit Vitals  /63   Pulse (!) 128   Temp 98.6 °F (37 °C)   Resp 16   Ht 5' 10\" (1.778 m)   Wt 123.1 kg (271 lb 4.8 oz)   SpO2 97%   BMI 38.93 kg/m²        Mary Jo Tucker RN    3 nitro given with minimal relief. Chest tightness rated 8/10. No acute distress. Labs drawn. EKG performed and shown to MD. Xray ordered. MD orders additional meds.

## 2022-09-08 NOTE — PROCEDURES
Hemodialysis / [phone]    Vitals Pre Post Assessment Pre Post   BP BP: (!) 124/58 (09/08/22 0600)   122/79 LOC AXOX4  NO CHANGE   HR Pulse (Heart Rate): (!) 57 (09/08/22 0600)   122 Lungs CLEAR NO CHANGE   Resp Resp Rate: 16 (09/08/22 0600) 15 Cardiac NSR NO CHANGE   Temp Temp: 98.6 °F (37 °C) (09/08/22 0400) 98.5 Skin WDI,  NO CHANGE   Weight    Edema +2 EXTREMITIES NO CHANGE   Tele status remote remote Pain Pain Intensity 1: 0 (09/08/22 0400) 8     Orders   Duration: Start: 0615 End: 0945 Total: 3.5   Dialyzer: Dialyzer/Set Up Inspection: Bob Willian ([id number]) (09/08/22 0600)   K Bath: Dialysate K (mEq/L): 2 (09/08/22 0600)   Ca Bath: Dialysate CA (mEq/L): 2.5 (09/08/22 0600)   Na: Dialysate NA (mEq/L): 140 (09/08/22 0600)   Bicarb: Dialysate HCO3 (mEq/L): 35 (09/08/22 0600)   Target Fluid Removal: Goal/Amount of Fluid to Remove (mL): 3000 mL (09/08/22 0600)     Access   Type & Location: TEJAL AVF: skin CDI. No s/s of infection. + B/T. No issues with cannulation or hemostasis. Running well at .     Comments:                                        Labs   HBsAg (Antigen) / date:              09/07/22 NEG                                 HBsAb (Antibody) / date: 09/06/22 [facility]   Source:    Obtained/Reviewed  Critical Results Called HGB   Date Value Ref Range Status   09/08/2022 8.2 (L) 11.5 - 16.0 g/dL Final     Potassium   Date Value Ref Range Status   09/08/2022 3.5 3.5 - 5.1 mmol/L Final     Calcium   Date Value Ref Range Status   09/08/2022 9.3 8.5 - 10.1 MG/DL Final     BUN   Date Value Ref Range Status   09/08/2022 33 (H) 6 - 20 MG/DL Final     Creatinine   Date Value Ref Range Status   09/08/2022 8.74 (H) 0.55 - 1.02 MG/DL Final        Meds Given   Name Dose Route                    Adequacy / Fluid    Total Liters Process: 80 L   Net Fluid Removed: 2070 ML      Comments   Time Out Done:   (Time) 0600 YES   Admitting Diagnosis: ROSEANN,SOB, FLUID OVERLOAD   Consent obtained/signed: Informed Consent Verified: Yes (09/08/22 0600)   Machine / RO # Machine Number: [id number] (09/08/22 0600)   Primary Nurse Rpt Pre: Sundar Houston RN   Primary Nurse Rpt Post: Leslee Meza RN   Pt Education: [employer], KAMAR   Care Plan: FOLLOW MD [address]   Pts outpatient clinic: NONE     Tx Summary   Pt  A&Ox4. Consent signed & on file. SBAR received from Primary RN.    0600: Pt cannulated with 69Z needles per policy & without issue. Labs drawn per request/ order. 0615 VSS. Dialysis Tx initiated. 0800 Pt resting quietly. 0945: Tx ended. VSS. All possible blood returned to patient. Hemostasis achieved without issue. Bed locked and in the lowest position, call bell and belongings in reach. SBAR given to Primary, RN. Patient is stable at time of my departure. All Dialysis related medications have been reviewed.       Comments:

## 2022-09-08 NOTE — PROGRESS NOTES
[facility] Dosing Services: 9/8/2022    Consult for Warfarin Dosing by Pharmacy by Dr. Madhavi Angel provided for this 61 y.o. female, for indication of History of Venous Thrombosis (confirmed no current VTE). Day of Therapy Resumed from PTA medication list (1mg on MWF, 2.5mg rest of week)  Dose to achieve an INR goal of 2-3    Order entered to hold Warfarin today as INR is supratherapeutic. Significant drug interactions: allopurinol (home med), erythromycin   Previous dose given HELD on 9/7  2.5mg on 9/6  1 mg on 9/5  2.5 mg on 9/4  2.5 mg on 9/3   PT/INR Lab Results   Component Value Date/Time    INR 3.8 (H) 09/08/2022 03:31 AM      Platelets Lab Results   Component Value Date/Time    PLATELET 574 56/79/0528 03:31 AM      H/H Lab Results   Component Value Date/Time    HGB 8.2 (L) 09/08/2022 03:31 AM        Pharmacy to follow daily and will provide subsequent Warfarin dosing based on clinical status.   JOHNSON Sequeira)  Contact information [phone]

## 2022-09-08 NOTE — PROGRESS NOTES
[address] [place]  YOB: 1955          Assessment & Plan:     ESRD on PD at [facility]  N/V ?gastroparesis ?uremia/underdialysis ?other  Hypotension  Hypokalemia  Anemia  ? CP during HD> trop is normal and better with nitro. Rec:  Seen during HD (TTS)  ALDO with HD  Arranging outpt HD at [facility]  She wants to keep PD cath and maybe re-try in the future. Will follow up with [place] PD for flushing etc. I am not sure that PD is going to work.        Subjective:   CC: ESRD  HPI: seen during HD  ROS: +abd pain, no vomiting  Current Facility-Administered Medications   Medication Dose Route Frequency    Warfarin: hold warfarin today as INR above 2-3 goal this AM   Other ONCE    0.9% sodium chloride infusion 250 mL  250 mL IntraVENous PRN    pantoprazole (PROTONIX) tablet 40 mg  40 mg Oral ACB&D    erythromycin (ERYC) capsule 250 mg  250 mg Oral TIDAC    epoetin charlie-epbx (RETACRIT) injection 10,000 Units  10,000 Units IntraVENous DIALYSIS TUE, THU & SAT    bisacodyL (DULCOLAX) tablet 5 mg  5 mg Oral Q12H    polyethylene glycol (MIRALAX) packet 17 g  17 g Oral Q12H    senna-docusate (PERICOLACE) 8.6-50 mg per tablet 1 Tablet  1 Tablet Oral Q12H    Warfarin Pharmacy to dose   Other Rx Dosing/Monitoring    ondansetron (ZOFRAN) injection 8 mg  8 mg IntraVENous Q6H    gentamicin (GARAMYCIN) 0.1 % ointment   Topical DAILY    gentamicin (GARAMYCIN) 0.1 % cream   Topical DIALYSIS PRN    nitroglycerin (NITROSTAT) tablet 0.4 mg  0.4 mg SubLINGual Q5MIN PRN    sodium chloride (NS) flush 5-40 mL  5-40 mL IntraVENous Q8H    sodium chloride (NS) flush 5-40 mL  5-40 mL IntraVENous PRN    acetaminophen (TYLENOL) tablet 650 mg  650 mg Oral Q6H PRN    Or    acetaminophen (TYLENOL) suppository 650 mg  650 mg Rectal Q6H PRN    albuterol-ipratropium (DUO-NEB) 2.5 MG-0.5 MG/3 ML  3 mL Nebulization Q6H PRN    allopurinoL (ZYLOPRIM) tablet 100 mg  100 mg Oral DAILY    atorvastatin (LIPITOR) tablet 80 mg  80 mg Oral QHS    calcitRIOL (ROCALTROL) capsule 0.5 mcg  0.5 mcg Oral DAILY    folic acid (FOLVITE) tablet 1 mg  1 mg Oral DAILY    midodrine (PROAMATINE) tablet 2.5 mg  2.5 mg Oral BID    potassium chloride SR (KLOR-CON 10) tablet 20 mEq  20 mEq Oral DAILY    sucralfate (CARAFATE) tablet 1 g  1 g Oral AC&HS          Objective:     Vitals:  Blood pressure 113/63, pulse (!) 128, temperature 98.6 °F (37 °C), resp. rate 16, height 5' 10\" (1.778 m), weight 123.1 kg (271 lb 4.8 oz), SpO2 97 %. Temp (24hrs), Av.9 °F (36.6 °C), Min:97.1 °F (36.2 °C), Max:98.6 °F (37 °C)      Intake and Output:   07 -  190  In: -   Out: 6864   1901 -  0700  In: 1260 [P.O.:1260]  Out: -     Physical Exam:               GENERAL ASSESSMENT: NAD  HEENT: Nontraumatic   CHEST: Clear  HEART: Reg  ABDOMEN: Soft,NT  EXTREMITY: +EDEMA; L FA AVF  NEURO: Grossly non focal          ECG/rhythm:    Data Review      No results for input(s): TNIPOC in the last 72 hours. No lab exists for component: ITNL   Recent Labs     22  0950   CPK 28     Recent Labs     22  0242 22  0845    136 134*   K 3.5 4.1 4.1    101 100   CO2 25 26 25   BUN 33* 30* 54*   CREA 8.74* 7.41* 11.10*   GLU 91 86 87   PHOS 2.6  --   --    MG 1.6  --   --    CA 9.2  9.3 9.0 8.5   ALB 2.3*  --   --    WBC 11.8* 10.9 9.0   HGB 8.2* 7.8* 6.8*   HCT 25.6* 24.5* 21.3*    201 185      Recent Labs     22  0242 22  0845   INR 3.8* 2.8* 1.8*   PTP 36.7* 27.0* 18.6*     Needs: urine analysis, urine sodium, protein and creatinine  Lab Results   Component Value Date/Time    Sodium,urine random 25 11/10/2014 12:40 PM    Creatinine, urine 145.29 2017 05:01 AM           : Shay Alejandro MD  2022        [facility]:  [email or web address]  Юлия Birch office:  [address] [place] [address] [address]  [place], [address] [facility]  Phone: [phone]  Fax :     [phone]    [place] office:  [address] [facility]  [place], [facility] [address] [facility]  Phone - [phone]  Fax - [phone]

## 2022-09-08 NOTE — PROGRESS NOTES
ppai  [address] [facility] Tracey Harrington [address]   Office [phone]  Fax [phone]          Subjective / Objective     Subjective    Patient seen and examined at bedside. She says her abdominal pain is still bad, and CP is unchanged from yesterday. Says her heart is constantly racing and she is concerned something is wrong with her heart. HA improved with tylenol, just off and on now. N/v still improved. No longer having tarry stools. Rapid response occurred at the end of her dialysis this morning for chest pain. EKG was sinus tach, trop was 32 (down from previous 33), and CXR showing edema vs atypical infection. Respiratory: O2 Device: Nasal cannula 2L, sating 100%  Visit Vitals  /63 (BP 1 Location: Right lower arm, BP Patient Position: At rest)   Pulse (!) 123   Temp 98.6 °F (37 °C)   Resp 16   Ht 5' 10\" (1.778 m)   Wt 271 lb 4.8 oz (123.1 kg)   SpO2 95%   BMI 38.93 kg/m²     Physical Examination:   General appearance - alert, in no acute distress. Chest - clear to auscultation, no wheezes, rales or rhonchi, symmetric air entry. Heart - normal rate, regular rhythm, normal S1, S2, no murmurs, rubs, clicks or gallops  Abdomen - soft, nondistended, no masses or organomegaly. No guarding. TTP in epigastric region. Neurological - alert, oriented, normal speech, no focal findings  Skin - warm, dry. No notable rashes  Extremities - No clubbing or cyanosis. Moving all extremities spontaneously.   Psychiatric - normal speech and thought processes    I/O:  09/07 0701 - 09/08 0700  In: 1260 [P.O.:1260]  Out: -   Inpatient Medications  Current Facility-Administered Medications   Medication    Warfarin: hold warfarin today as INR above 2-3 goal this AM    0.9% sodium chloride infusion 250 mL    pantoprazole (PROTONIX) tablet 40 mg    [facility] capsule 250 mg    epoetin charlie-epbx (RETACRIT) injection 10,000 Units    bisacodyL (DULCOLAX) tablet 5 mg    polyethylene glycol (MIRALAX) packet 17 g senna-docusate (PERICOLACE) 8.6-50 mg per tablet 1 Tablet    Warfarin Pharmacy to dose    ondansetron (ZOFRAN) injection 8 mg    gentamicin (GARAMYCIN) 0.1 % ointment    gentamicin (GARAMYCIN) 0.1 % cream    nitroglycerin (NITROSTAT) tablet 0.4 mg    sodium chloride (NS) flush 5-40 mL    sodium chloride (NS) flush 5-40 mL    acetaminophen (TYLENOL) tablet 650 mg    Or    acetaminophen (TYLENOL) suppository 650 mg    albuterol-ipratropium (DUO-NEB) 2.5 MG-0.5 MG/3 ML    allopurinoL (ZYLOPRIM) tablet 100 mg    atorvastatin (LIPITOR) tablet 80 mg    calcitRIOL (ROCALTROL) capsule 0.5 mcg    folic acid (FOLVITE) tablet 1 mg    midodrine (PROAMATINE) tablet 2.5 mg    potassium chloride SR (KLOR-CON 10) tablet 20 mEq    sucralfate (CARAFATE) tablet 1 g     Current Facility-Administered Medications   Medication Dose Route Frequency    Warfarin: hold warfarin today as INR above 2-3 goal this AM   Other ONCE    0.9% sodium chloride infusion 250 mL  250 mL IntraVENous PRN    pantoprazole (PROTONIX) tablet 40 mg  40 mg Oral ACB&D    erythromycin (ERYC) capsule 250 mg  250 mg Oral TIDAC    epoetin charlie-epbx (RETACRIT) injection 10,000 Units  10,000 Units IntraVENous DIALYSIS TUE, THU & SAT    bisacodyL (DULCOLAX) tablet 5 mg  5 mg Oral Q12H    polyethylene glycol (MIRALAX) packet 17 g  17 g Oral Q12H    senna-docusate (PERICOLACE) 8.6-50 mg per tablet 1 Tablet  1 Tablet Oral Q12H    Warfarin Pharmacy to dose   Other Rx Dosing/Monitoring    ondansetron (ZOFRAN) injection 8 mg  8 mg IntraVENous Q6H    gentamicin (GARAMYCIN) 0.1 % ointment   Topical DAILY    gentamicin (GARAMYCIN) 0.1 % cream   Topical DIALYSIS PRN    nitroglycerin (NITROSTAT) tablet 0.4 mg  0.4 mg SubLINGual Q5MIN PRN    sodium chloride (NS) flush 5-40 mL  5-40 mL IntraVENous Q8H    sodium chloride (NS) flush 5-40 mL  5-40 mL IntraVENous PRN    acetaminophen (TYLENOL) tablet 650 mg  650 mg Oral Q6H PRN    Or    acetaminophen (TYLENOL) suppository 650 mg  650 mg Rectal Q6H PRN    albuterol-ipratropium (DUO-NEB) 2.5 MG-0.5 MG/3 ML  3 mL Nebulization Q6H PRN    allopurinoL (ZYLOPRIM) tablet 100 mg  100 mg Oral DAILY    atorvastatin (LIPITOR) tablet 80 mg  80 mg Oral QHS    calcitRIOL (ROCALTROL) capsule 0.5 mcg  0.5 mcg Oral DAILY    folic acid (FOLVITE) tablet 1 mg  1 mg Oral DAILY    midodrine (PROAMATINE) tablet 2.5 mg  2.5 mg Oral BID    potassium chloride SR (KLOR-CON 10) tablet 20 mEq  20 mEq Oral DAILY    sucralfate (CARAFATE) tablet 1 g  1 g Oral AC&HS     Allergies  Allergies   Allergen Reactions    Contrast Dye [Iodine] Anaphylaxis     Tolerates when pre medicated w/ IV solumedrol and benadryl prior to procedure     Shellfish Derived Anaphylaxis    Levaquin [Levofloxacin] Nausea and Vomiting    Morphine Hives and Itching    Nsaids (Non-Steroidal Anti-Inflammatory Drug) Nausea and Vomiting     CBC:  Recent Labs     09/08/22 0331 09/07/22  0242 09/06/22  0845   WBC 11.8* 10.9 9.0   HGB 8.2* 7.8* 6.8*    201 734       Metabolic Panel:  Recent Labs     09/08/22 0331 09/07/22  0242 09/06/22  0845    136 134*   K 3.5 4.1 4.1    101 100   CO2 25 26 25   BUN 33* 30* 54*   CREA 8.74* 7.41* 11.10*   GLU 91 86 87   CA 9.2  9.3 9.0 8.5   MG 1.6  --   --    PHOS 2.6  --   --    ALB 2.3*  --   --    ALT 15  --   --    INR 3.8* 2.8* 1.8*       Imaging/procedures:   CT ABD PELV WO CONT    Result Date: 9/1/2022  1. Increasing moderate volume ascites. Right abdominal peritoneal catheter. 2.  Grossly unchanged right mid renal indeterminate lesion in several additional bilateral likely cystic lesions as described above. 3.  Bilateral nonobstructive nephrolithiasis. 4.  Cholecystectomy. 5.  Evidence of chronic interstitial lung disease. XR CHEST PORT    Result Date: 9/8/2022  Similar to slightly worsening diffuse hazy reticulonodular opacities may reflect edema or atypical infectious process.  Additional right upper lobe patchy opacity and bronchial ectatic changes similar. Stable small right pleural effusion. XR CHEST PORT    Result Date: 9/5/2022  1. No interval change in bilateral interstitial and alveolar opacities           Assessment and Plan     Jony Watson is a 61 y.o. female with PMH ESRD on PD, CAD with MI 2012, HFpEF, ILD on 2L home O2, JASEN, GERD, h/o DVT in RLE on warfarin, HTN, HLD, admitted with intractable n/v and epigastric pain. Intractable nausea and vomiting with epigastric abd pain: CT AP showed increasing moderate volume ascites, unchanged b/l nonobstructive nephrolithiasis. Possibly 2/2 gastroparesis vs CHF vs uremia. Peritoneal fluid cx no growth. GI no need for endoscopy. -Zofran 8mg q6h  -Protonix  -GI consulted, appreciate recommendations  -Nephrology consulted, appreciate recommendations  -Miralax daily   -Erythromycin  -GI cocktail  -Nutrition OP upon d/c    Anemia: Improved s/p 1u pRBC on 9/6. Hb 6.8-->7.8 (BL 10). - Monitor for symptoms, signs of bleeding  - Monitor CBC  - ALDO per nephro     Chronic hypokalemia: Baseline 3.3. On KCl 20 meq daily at home. Likely 2/2 continued n/v. EKG wnl.  -Continue home KCl 20 meq daily   -Monitor BMP    Supratherapeutic INR on warfarin: (Resolved) INR 13.5 on 9/3, no longer elevated. -Home warfarin, pharmacy to dose  -PT/INR daily     Hypotension in the setting of HTN: recently taken off of home carvedilol 6.25 mg and Imdur 120 mg CR by her PCP due to low BPs. Also started on midodrine 2.5 mg BID. -Hold home carvedilol 6.25 mg and Imdur 120 mg CR  -Continue midodrine 2.5 mg BID  -Will continue to monitor at this time and readjust as BP's trend. ESRD on Dialysis:  Follows with Dr Alison Christiansen (nephrology). -Consult nephrology, appreciate recommendations  - Per pt preference, switched from PD to HD, TTS  -Avoid Nephrotoxins  -Renally dose medications  -Continue Calcitriol 0.5mg daily     Coronary artery disease with history of MI: 1 SAL to OM 2004, MI in 2012 s/p 2 SAL to OM2/OM3. Has chronic proximal occlusion with L to R collaterals. Follows with Amiee Buck (cardiologist). Echo with EF 60-65%. Worsening CP and tachycardia 9/5, but EKG, trop, CXR wnl.  - Remote tele  - Cont home lipitor 80mg QHS  - Cardiology consulted     Chronic HFpEF: Per chart review last echo in 2019 (EF 66-70%). Pt reports recent admission to outside hospital for diuresis. Echo EF 60-65%. -Monitor for signs/symptoms of HF     Interstitial lung disease: With chronic hypoxia on 2 L oxygen via nasal cannula at home. Biopsy negative for sarcoid per patient. Attributed to smoking history. - Duonebs prn  - Humidified air  - Home 2L O2     Chronic sacral ulcer: painful.   -Consult inpatient wound care     T2DM with CKD: Last HgA1C 4.7 (3/25/22). Not on any medications currently. - Monitor daily labs     GERD Stable. -Continue home sucralfate   -Protonix 40 IV daily     Hx of DVT: Of right leg. No recurrence. Home warfarin 1 mg MWF with 2.5 mg Tue Thur Sat.   -Home warfarin, pharmacy to dose  -PT/INR daily     History of gout: Takes allopurinol 100 mg daily. Has not had a recent flare.   -Continue home allopurinol 100 mg daily     Intermittent constipation: Takes docusate sodium daily. -MiraLAX PRN     Hyperlipidemia: Total 100, TG 78, HDL 51, LDL 33.4.   -Continue home Lipitor 80 mg nightly    Obstructive sleep apnea: Uses CPAP nightly, well-controlled at this time. Former smoker: 0.5 PPD for 41 years for 20.5-pack-year history. Quit on 11/9/2014. Obesity: PT with BMI 38.05 kg/m². - Encouraging lifestyle modifications and further follow up outpatient.     Patient discussed with Jeevan Childs, *  Yogesh De MD  [facility] Medicine Resident       For Billing    Chief Complaint   Patient presents with    Nausea    Vomiting    Fatigue       Hospital Problems  Date Reviewed: 9/2/2022            Codes Class Noted POA    * (Principal) Intractable vomiting with nausea ICD-10-CM: R11.2  ICD-9-CM: 536.2 9/2/2022 Yes        Hypokalemia ICD-10-CM: E87.6  ICD-9-CM: 276.8  7/23/2022 Yes        Supratherapeutic INR ICD-10-CM: R79.1  ICD-9-CM: 790.92  9/2/2022 Yes        Hyponatremia ICD-10-CM: E87.1  ICD-9-CM: 276.1  9/2/2022 Yes        Abdominal pain ICD-10-CM: R10.9  ICD-9-CM: 789.00  7/23/2022 Yes        (HFpEF) heart failure with preserved ejection fraction ([facility] ICD-10-CM: I50.30  ICD-9-CM: 428.9  9/23/2018 Yes        ESRD on peritoneal dialysis [facility]) ICD-10-CM: N18.6, Z99.2  ICD-9-CM: 585.6, V45.11  6/23/2018 Yes        Hx of deep venous thrombosis ICD-10-CM: Z86.718  ICD-9-CM: V12.51  5/30/2018 Yes        Diabetic gastroparesis (HCC) ICD-10-CM: E11.43, K31.84  ICD-9-CM: 250.60, 536.3  Unknown Yes        GERD (gastroesophageal reflux disease) ICD-10-CM: K21.9  ICD-9-CM: 530.81  Unknown Yes        DM type 2 causing neurological disease ([facility] ICD-10-CM: E11.49  ICD-9-CM: 250.60  Unknown Yes        Gout ICD-10-CM: M10.9  ICD-9-CM: 274.9  Unknown Yes        ILD (interstitial lung disease) ([facility] ICD-10-CM: J84.9  ICD-9-CM: 460  8/20/2017 Yes        Warfarin anticoagulation ICD-10-CM: Z79.01  ICD-9-CM: V58.61  8/20/2017 Yes        HTN (hypertension) (Chronic) ICD-10-CM: I10  ICD-9-CM: 401.9  10/1/2012 Yes        Coronary artery disease (Chronic) ICD-10-CM: I25.10  ICD-9-CM: 414.00  2/16/2011 Yes    Overview Addendum 10/5/2012  7:33 AM by Deborah Cabral, [address] 2004  1v CAD by cath - PCI OM with SAL  Cath 5/2004 - patent stent, no new disease  Lexiscan cardiolite 12/09- normal perfusion, EF 66%  Cath: 3/19/12: PA 55/30 mean 41, wedge 26, RA 24, EDP 35, LVG 55%, LM normal, LAD normal, LCX - OM1 patent stent, OM2 prox 85% long, % with L to R collaterals                JASEN on CPAP (Chronic) ICD-10-CM: G47.33, Z99.89  ICD-9-CM: 327.23, V46.8  2/16/2011 Yes    Overview Signed 3/21/2012  8:04 AM by Basil Davis CPAP

## 2022-09-08 NOTE — PROGRESS NOTES
OCCUPATIONAL THERAPY EVALUATION/DISCHARGE  Patient: Landry Blanca (31 y.o. female)  Date: 9/8/2022  Primary Diagnosis: Abdominal pain [R10.9]       Precautions: Fall       ASSESSMENT  Based on the objective data described below, the patient presents with mildly impaired standing balance and decreased strength following admission for abdominal pain and vomiting. Pt today is seen following HD, received up in bathroom with PT present. Pt is alert, oriented, and follows all commands. She performs toileting tasks in bathroom with overall supervision/SBA including ADL transfer. Pt with slow pace using personal SPC and reports baseline L knee pain but manages her ADL tasks with assistance as needed from . Pt has needed DME and AE, including reacher. Pt seated in chair at end of session with all needs met and declines OT needs at this time. HR at 114 bpm at rest and up to 140 with activity. Current Level of Function (ADLs/self-care): overall SBA/CGA for ADL transfers; set up to occasional min A for ADLs    Functional Outcome Measure: The patient scored 60/100 on the Barthel outcome measure which is indicative of minimal functional impairment. Other factors to consider for discharge: lives with supportive ; home O2 needs; tachycardia (had rapid response for chest pain earlier today, troponins negative and all imaging completed - pt cleared to participate). PLAN :  Recommend with staff: OOB to chair for meals; mobility to bathroom for toileting; ADLs as needed    Recommendation for discharge: (in order for the patient to meet his/her long term goals)  No skilled occupational therapy/ follow up rehabilitation needs identified at this time.     This discharge recommendation:  Has been made in collaboration with the attending provider and/or case management    IF patient discharges home will need the following DME: patient owns DME required for discharge       SUBJECTIVE:   Patient stated Jhon Frederick I've been getting up to the bathroom and sitting up every day myself.     OBJECTIVE DATA SUMMARY:   HISTORY:   Past Medical History:   Diagnosis Date     Sleep Apnea 2/16/2011    Compliant with c-pap. Aortic aneurysm (HCC)     Abdominal    CAD (coronary Artery Disease) Native Artery 2/16/2011    Chronic kidney disease     Hemodiaylsis  3x/week    Chronic pain     CKD (chronic kidney disease) stage 4, GFR 15-29 ml/min ([facility]) 2/10/2017    Diabetic gastroparesis (HCC)     Diastolic heart failure ([address] 10/5/2012    DM type 2 causing neurological disease ([address]     DM type 2 causing renal disease (HCC)     Insulin SS at night only PRN    DM type 2 causing vascular disease ([address]     Esophageal stricture 2012    dialted Dr. Lydia Evans    G6PD deficiency     trait    GERD (gastroesophageal reflux disease)     Gout     Heart failure ([address]     Hemodialysis access, AV graft ([address] 04/02/2017    Dialysis MWF    [address]      Hypertension     ILD (interstitial lung disease) ([address]     open lung bx CJW 2010    Infected dental caries 3/17/2020    Infection of total right knee replacement ([address] 3/17/2020    Loss of appetite 3/17/2020    Morbid obesity ([address]     OA (osteoarthritis)     Ovarian cancer ([address]     cervical and uterine    Rheumatoid arteritis (HCC)     S/P left pulmonary artery pressure sensor implant placement 8/31/2017    Cardiomems     Thromboembolus ([address]     Right calf     Tobacco use disorder 3/21/2012    Uterine cervix cancer ([address]     Vitamin D deficiency 8/9/2013     Past Surgical History:   Procedure Laterality Date    COLONOSCOPY N/A 6/24/2016    [facility] [address] [facility] [address] [facility]    HX ADENOIDECTOMY      HX APPENDECTOMY      HX [address]      bilateral    HX CHOLECYSTECTOMY      HX HEART CATHETERIZATION      x 7    HX HERNIA REPAIR      HX HYSTERECTOMY      HX ORTHOPAEDIC Right 11/12/12    right knee replacement    HX ORTHOPAEDIC Bilateral     carpal tunnel repair HX ORTHOPAEDIC Right     PLATE IN ANKLE DUE TO FRACTURE    HX SALPINGO-OOPHORECTOMY      HX TONSIL AND ADENOIDECTOMY      HX TONSILLECTOMY      HX VASCULAR ACCESS Left     Left lower arm AV fistula    IR INSERT PERITONEAL VENOUS SHUNT      SD CARDIAC SURG PROCEDURE UNLIST  02/2019    x4 with last sttent placed 2/2019. SD CHEST SURGERY PROCEDURE UNLISTED Right     > 20 years ago  RLL removed     UPPER GI ENDOSCOPY,VINOD W GUIDE  6/24/2016            Prior Level of Function/Environment/Context: MOD I with SPC; overall mod I for ADLs but has 's min A for ADLs as needed  Expanded or extensive additional review of patient history:   Home Situation  Living Alone: No  Support Systems: Spouse/Significant Other  Patient Expects to be Discharged to[de-identified] Home with home health  Current DME Used/Available at Home: Flora Ok, straight, Walker, rollator, Walker, rolling    Hand dominance: Right    EXAMINATION OF PERFORMANCE DEFICITS:  Cognitive/Behavioral Status:  Neurologic State: Alert  Orientation Level: Oriented X4  Cognition: Appropriate decision making; Appropriate for age attention/concentration; Appropriate safety awareness; Follows commands  Perception: Appears intact  Perseveration: No perseveration noted  Safety/Judgement: Awareness of environment; Fall prevention;Good awareness of safety precautions; Home safety; Insight into deficits    Hearing: Auditory  Auditory Impairment: None    Range of Motion:  AROM: Generally decreased, functional  PROM: Generally decreased, functional    Strength:  Strength: Generally decreased, functional    Coordination:  Coordination: Within functional limits  Fine Motor Skills-Upper: Left Intact; Right Intact    Gross Motor Skills-Upper: Left Intact; Right Intact    Tone:  Tone: Normal    Balance:  Sitting: Intact  Standing: Intact; With support    Functional Mobility and Transfers for ADLs:  Bed Mobility:  Rolling: Independent  Supine to Sit: Minimum assistance;Assist x1 (assist with right leg due to hip pain)    Transfers:  Sit to Stand: Contact guard assistance;Assist x1  Stand to Sit: Contact guard assistance;Assist x1;Additional time  Toilet Transfer : Stand-by assistance    ADL Assessment:  Feeding: Independent    Oral Facial Hygiene/Grooming: Setup    Bathing: Minimum assistance    Upper Body Dressing: Setup    Lower Body Dressing: Setup;Minimum assistance    Toileting: Supervision;Stand by assistance       ADL Intervention and task modifications:  Grooming  Grooming Assistance: Set-up  Position Performed: Seated in chair  Washing Hands: Set-up    Lower Body Dressing Assistance  Socks: Set-up  Leg Crossed Method Used: No  Position Performed: Long sitting on bed  Cues: Ashley Yuan  Bladder Hygiene: Modified independent  Bowel Hygiene: Supervision  Clothing Management: Stand-by assistance  Adaptive Equipment: Grab bars; Walker    Cognitive Retraining  Safety/Judgement: Awareness of environment; Fall prevention;Good awareness of safety precautions; Home safety; Insight into deficits    Functional Measure:    Barthel Index:  Bathin  Bladder: 10  Bowels: 10  Groomin  Dressin  Feeding: 10  Mobility: 0  Stairs: 0  Toilet Use: 10  Transfer (Bed to Chair and Back): 10  Total: 60/100      The Barthel ADL Index: Guidelines  1. The index should be used as a record of what a patient does, not as a record of what a patient could do. 2. The main aim is to establish degree of independence from any help, physical or verbal, however minor and for whatever reason. 3. The need for supervision renders the patient not independent. 4. A patient's performance should be established using the best available evidence. Asking the patient, friends/relatives and nurses are the usual sources, but direct observation and common sense are also important. However direct testing is not needed.   5. Usually the patient's performance over the preceding 24-48 hours is important, but occasionally longer periods will be relevant. 6. Middle categories imply that the patient supplies over 50 per cent of the effort. 7. Use of aids to be independent is allowed. Score Interpretation (from [address])    Independent   60-79 Minimally independent   40-59 Partially dependent   20-39 Very dependent   <20 Totally dependent     -Ashleigh Dumont., Barthel, D.W. (1965). Functional evaluation: the Barthel Index. [address] ([address] (1997). The Barthel activities of daily living index: self-reporting versus actual performance in the old (> or = 75 years). Journal of [address] [place] [address] 45(7), [facility], YOVANNY, Kady Marino., Chanda Prado. (1999). Measuring the change in disability after inpatient rehabilitation; comparison of the responsiveness of the Barthel Index and Functional Achille Measure. Journal of Neurology, Neurosurgery, and Psychiatry, 66(4), 899-776. Blanka Fields, N.J.A, JENIFFER Smith, & Amarjit Agee MBonitaA. (2004) Assessment of post-stroke quality of life in cost-effectiveness studies: The usefulness of the Barthel Index and the EuroQoL-5D. Quality of Life Research, 15, 912-62     Occupational Therapy Evaluation Charge Determination   History Examination Decision-Making   LOW Complexity : Brief history review  LOW Complexity : 1-3 performance deficits relating to physical, cognitive , or psychosocial skils that result in activity limitations and / or participation restrictions  MEDIUM Complexity : Patient may present with comorbidities that affect occupational performnce.  Miniml to moderate modification of tasks or assistance (eg, physical or verbal ) with assesment(s) is necessary to enable patient to complete evaluation       Based on the above components, the patient evaluation is determined to be of the following complexity level: LOW   Pain Rating:  Pt reporting pain in R hip and knee    Activity Tolerance:   Good and Fair    After treatment patient left in no apparent distress:    Sitting in chair and Call bell within reach    COMMUNICATION/EDUCATION:   The patients plan of care was discussed with: Physical therapist and Registered nurse.      Thank you for this referral.  Julienne Lyn, OT  Time Calculation: 21 mins

## 2022-09-08 NOTE — PROGRESS NOTES
Noted patient had a code called due to chest pain this morning. Troponins negative, EKG WNL. Patient currently sitting up in bed eating lunch asking for therapy to return later.

## 2022-09-08 NOTE — PROGRESS NOTES
Cardiology Update:    Review of records from Dr. Candelaria Nowak office reveals ongoing issues with CP and SOB. Hx of palpitations as well, recent event monitor without AF, 2% PVC's and 1 short burst of atrial tachycardia, otherwise normal.     Hx of CAD with PCI to RCA CTA by Dr. Claudia Menchaca in 2019, LCx 60% lesion but negative FFR. Apparently had repeat cath at [facility] in Sept 2020 with new lesion and unable to PCI, records not available from Dr. Candelaria Nowak office. Treated for chronic atypical pattern of angina w/ Imdur. Stress test 5/10/22 normal with attenuation artifact, no stress induced ischemia, EF 62%.

## 2022-09-08 NOTE — PROGRESS NOTES
Comprehensive Nutrition Assessment    Type and Reason for Visit: Reassess    Nutrition Recommendations/Plan:   Continue regular, 2 gm Na, GI bland diet order  Provide Nepro once daily to increase kcal/protein intake (420 kcal, 38 g Carbs, 19 g Protein)      Malnutrition Assessment:  Malnutrition Status: Moderate malnutrition    Context:  Acute illness     Findings of the 6 clinical characteristics of malnutrition:   Energy Intake:  75% or less of est energy req for 7 or more days  Weight Loss:  5% over one month     Body Fat Loss:  Mild body fat loss, Triceps   Muscle Mass Loss:  No significant muscle mass loss,    Fluid Accumulation:  Moderate to severe, Extremities, Generalized   Strength:  Not performed     Nutrition Assessment:     9/8: Follow up. Patient had rapid this AM - workup for chest pain. On HD now but has been tolerating PD PTA. No noted N/V/D but complaints of abd pain. Edema has worsened. No BM x 3 days per documentation. Noted GI cocktail now ordered. Unable to determine whether patient has tried ONS; patient declines visit when RD entered room. Last 3 Recorded Weights in this Encounter    09/05/22 1003 09/05/22 1804 09/07/22 0354   Weight: 118.8 kg (262 lb) 120.3 kg (265 lb 3.4 oz) 123.1 kg (271 lb 4.8 oz)       Documented Meal intake:  Patient Vitals for the past 168 hrs:   % Diet Eaten   09/07/22 1800 76 - 100%   09/07/22 0953 26 - 50%   09/06/22 0800 26 - 50%   09/05/22 1804 26 - 50%   09/05/22 1216 26 - 50%   09/05/22 0826 51 - 75%       Nutrition Related Findings:    nausea, chest pain, edema, poor intake for 2 months reported Wound Type: Pressure injury, Stage I (sacral, stage 1 - POA)  Last Bowel Movement Date: 09/06/22  Stool Appearance: Loose  Abdominal Assessment: Obese, Tender  Bowel Sounds:  Active   Edema:Generalized: Non-pitting (9/7/2022  8:45 AM)  LLE: 2+; Non-pitting (9/7/2022  8:00 PM)  LUE: Non-pitting (9/7/2022  8:00 PM)  RLE: 2+; Non-pitting (9/7/2022  8:00 PM)  RUE: Non-pitting (9/7/2022  8:00 PM)      Nutr. Labs:    Lab Results   Component Value Date/Time    GFR est AA 6 (L) 09/08/2022 03:31 AM    GFR est non-AA 5 (L) 09/08/2022 03:31 AM    Creatinine (POC) 6.77 (H) 04/14/2022 08:41 AM    Creatinine 8.74 (H) 09/08/2022 03:31 AM    BUN 33 (H) 09/08/2022 03:31 AM    BUN (POC) 30 (H) 05/24/2019 08:18 AM    Sodium (POC) 141 04/14/2022 08:41 AM    Sodium 136 09/08/2022 03:31 AM    Potassium 3.5 09/08/2022 03:31 AM    Potassium (POC) 3.1 (L) 04/14/2022 08:41 AM    Chloride (POC) 104 04/14/2022 08:41 AM    Chloride 103 09/08/2022 03:31 AM    CO2 25 09/08/2022 03:31 AM       Lab Results   Component Value Date/Time    Glucose 91 09/08/2022 03:31 AM    Glucose (POC) 98 09/05/2022 04:12 PM       Lab Results   Component Value Date/Time    Hemoglobin A1c 4.7 03/25/2022 03:07 PM    Hemoglobin A1c, External 5.0 05/19/2021 12:00 AM       Nutr. Meds:  Lipitor, dulcolax, folic acid, midodrine, zofran, Protonix, miralax, klor-con, pericolace, Carafate, warfarin      Current Nutrition Intake & Therapies:  Average Meal Intake: 26-50%  Average Supplement Intake: Unable to assess  ADULT DIET Regular; Low Sodium (2 gm); GI Dawson (GERD/Peptic Ulcer); does well with chicken noodle soup  ADULT ORAL NUTRITION SUPPLEMENT Dinner; Renal Supplement    Anthropometric Measures:  Height: 5' 10\" (177.8 cm)  Ideal Body Weight (IBW): 150 lbs (68 kg)  Admission Body Weight: 270 lb  Current Body Wt:  123.1 kg (271 lb 6.2 oz), 180.9 % IBW. Bed scale  Current BMI (kg/m2): 38.9  Usual Body Weight: 125.6 kg (277 lb)  % Weight Change (Calculated): -5.4  Weight Adjustment: No adjustment                 BMI Category: Obese class 2 (BMI 35.0-39. 9)    Estimated Daily Nutrient Needs:  Energy Requirements Based On: Kcal/kg  Weight Used for Energy Requirements: Current  Energy (kcal/day): 3077 (25 kcal/kg)  Weight Used for Protein Requirements: Current  Protein (g/day): 145 (1.2 g/kg, on PD)  Method Used for Fluid Requirements: Standard renal  Fluid (ml/day): 1500 mL    Nutrition Diagnosis:   Inadequate oral intake related to altered GI function as evidenced by nausea, poor intake prior to admission  Moderate malnutrition, In context of chronic illness related to increased demand for energy/nutrients, inadequate protein-energy intake as evidenced by weight loss, Criteria as identified in malnutrition assessment    Nutrition Interventions:   Food and/or Nutrient Delivery: Continue current diet, Continue oral nutrition supplement  Nutrition Education/Counseling: No recommendations at this time  Coordination of Nutrition Care: Continue to monitor while inpatient, Interdisciplinary rounds  Plan of Care discussed with: IDR team    Goals:  Previous Goal Met: Progressing toward goal(s)  Goals: PO intake 50% or greater, by next RD assessment       Nutrition Monitoring and Evaluation:   Behavioral-Environmental Outcomes: None identified  Food/Nutrient Intake Outcomes: Food and nutrient intake, Supplement intake  Physical Signs/Symptoms Outcomes: Biochemical data, Nausea/vomiting, Weight    Discharge Planning:    Continue oral nutrition supplement    Mena Waters MS, RD  Contact: Ext: [phone], or via [employer]

## 2022-09-08 NOTE — PROGRESS NOTES
Problem: Falls - Risk of  Goal: *Absence of Falls  Description: Document Xiomy Led Fall Risk and appropriate interventions in the flowsheet. Outcome: Progressing Towards Goal  Note: Fall Risk Interventions:  Mobility Interventions: Bed/chair exit alarm         Medication Interventions: Patient to call before getting OOB, Teach patient to arise slowly    Elimination Interventions: Call light in reach, Patient to call for help with toileting needs, Elevated toilet seat    History of Falls Interventions: Bed/chair exit alarm         Problem: Pressure Injury - Risk of  Goal: *Prevention of pressure injury  Description: Document Randy Scale and appropriate interventions in the flowsheet.   Outcome: Progressing Towards Goal  Note: Pressure Injury Interventions:  Sensory Interventions: Assess changes in LOC, Check visual cues for pain    Moisture Interventions: Absorbent underpads, Minimize layers    Activity Interventions: Assess need for specialty bed    Mobility Interventions: Assess need for specialty bed, HOB 30 degrees or less    Nutrition Interventions: Document food/fluid/supplement intake, Offer support with meals,snacks and hydration    Friction and Shear Interventions: HOB 30 degrees or less, Lift sheet, Minimize layers

## 2022-09-08 NOTE — PROGRESS NOTES
Response to RRT in 511. No family present. Provided ministry of presence and silent prayer. Collaborated with staff. Visited by: Gris Seay.  Gloria Whitfield, [address] [place] [address] paging Service [phone] ([phone])

## 2022-09-08 NOTE — PROGRESS NOTES
Cardiology Initial Care Encounter    Patient: Asia Villa MRN: [id number]     YOB: 1955  Age: 61 y.o. Sex: female      Admit Date: 9/1/2022       Assessment/Plan     CP, hx of CAD, prior PCI to RCA: intermittent since admission and chronic. Troponin neg thus far, EKG sinus tach, TTE nl LV function and no WMA, EF 60-65%. Recent stress test at Dr. Tiki Long office - will obtain results. Would resume low dose Imdur 30 mg     2. Tachycardia: recently taken off coreg, will resume low dose despite on midodrine. HR better now that pain has resolved. Has received IVF/PRBCs since admission, doubt dehydration would still be issue    3. Hx of HFpEF: EF 60-65% on TTE, HD for volume removal. Recent admission last month to [facility] for volume overload/CHF exacerbation     4. Nausea, vomiting, abd pain on admit: seen by GI, on zofran. N/V improved    5. Anemia: Hgb 6.8, received PRBCs, Hgb > 8 now. On warfarin     6. ESRD on HD: prev on PD    7. JASEN on CPAP         NP spent 10 minutes reviewing chart, labs, diagnostics and coordination of care . NP spent 10 minutes with pt/family in examination and care plan review. HPI     Asia Villa is a 61 y.o.  female with PMH significant for CAD, CHF, HTN, HLD, DM, ESRD on HD, DVT to RLE - on warfarin, interstitial lung dz, gout and JASEN on CPAP. Pt presented to the ED initially for c/o nausea, vomiting, dizziness for several weeks, unrelieved by Zofran. Associated with decreased appetite, fatigue and abdominal pain. She was on PD prior to admission. While admitted, she has complained of CP which occurs intermittently and associated with elevated HR, along w/ dyspnea. Pt is able to feel her heart racing. She was recently taken off coreg and Imdur due to lower BP and placed on midodrine prior to admission. Had a rapid response called this morning for CP 10/10 and HR elevated to 120-130's.  She was given NTG and pain has improved some. She is a pt of Dr. Candelaria Nowak, reportedly had a stress test 2-3 months ago. ECHO 09/01/22    ECHO ADULT COMPLETE 09/02/2022 9/2/2022    Interpretation Summary  Formatting of this result is different from the original.      Left Ventricle: Normal left ventricular systolic function with a visually estimated EF of 60 - 65%. Left ventricle size is normal. Normal wall thickness. Normal wall motion. Diastolic dysfunction present with normal LV EF. Aortic Valve: Valve structure is normal. Mildly calcified cusp. Mild sclerosis of the aortic valve cusp. Mitral Valve: Valve structure is normal. Moderate annular calcification of the mitral valve. Aorta: Normal sized aortic root and ascending aorta. Mildly dilated sinus of Valsalva 3.8cm. Technical qualifiers: Echo study was limited due to patient's condition and limited due to patient tolerance. Signed by: Brenna Rodriguez MD on 9/2/2022 11:39 AM    Cardiac cath 02/19/19    CARDIAC PROCEDURE 02/19/2019 2/19/2019  INVASIVE VASCULAR PROCEDURE 02/19/2019 2/19/2019    Conclusion  Successful SAL of  RCA    Signed by: Adilson Garcia MD on 2/19/2019  3:19 PM    The patient has been referred to cardiology for on going management of CP, CAD hx.     Review of Symptoms:  Constitutional:  +fatigue  ENT: negative   Respiratory: negative  Gastrointestinal: negative  Genitourinary: no dysuria, hematuria, frequency   Musculoskeletal:negative  Neurological: negative  Other systems reviewed and negative except as above.       Previous cardiac hx  CATH: 2004: 1v CAD by cath - PCI OM with SAL  CATH 5/2004 - patent stent, no new disease   Lexiscan cardiolite 12/09- normal perfusion, EF 66%  ECHO 11/2009: LVH, EF 27%, diastolic dysfunction  STRESS/LEXISCAN/CARDIOLITE 3/29/11: normal perfusion, EF 62%  CATH: 3/20/2012 :PA 55/30 mean 41, wedge 26, RA 24, EDP 35, LVG 55%, LM normal, LAD normal, LCX - OM1 patent stent, OM2 prox 85% long, % w/ L -> R collateral; s/p SAL-> OM2/OM3 with SAL. CATH: 10/4/2012: EDP 25, EF 55%, LM nl, LAD mild plaque, LCX patent OM stents, % prox with good L to R collaterals. Cath 3/18/2014 - RA 9 PA , PCW 12, EDP 25, no LVG due to CKD, LM nl, LAD mild plaque, LCX patent stents OM1/OM2, RCA chronic proximal occlusion with L to R collaterals. ECHO 16: EF 60-65%. No WMA. LA mildly dilated. Lexiscan Cardiolite 16: Normal. LVEF 55%. Compared to 3/29/11, no significant changes. RHC/CardioMEMS implant 17 - RA 12, PA , CI (Mayte) 2.65    Echo 17 - EF 65%. G1DD. No WMA. Wall thickness was mildly to moderately increased. Limited Echo 17 - Tricuspid valve: Pulmonary artery diastolic pressure: 98.85 mmHg. [address] 18 Moderate-severe PA HTN, post capillary  Marked elevation in biV filling pressures    Event Monitor in 2018 demonstrated frequent PVCs    48 hour Holter monitor 19 - SB to ST 53 to 109, average HR 73. No AF/flutter.   Echo 19 - EF 66-70%  Risk factors: prior CAD, HTN, HLD, DM, ESRD    Social History     Tobacco Use    Smoking status: Former     Packs/day: 0.50     Years: 41.00     Pack years: 20.50     Types: Cigarettes     Quit date: 2014     Years since quittin.8    Smokeless tobacco: Never   Substance Use Topics    Alcohol use: No     Family History   Problem Relation Age of Onset    Heart Disease Mother     No Known Problems Daughter     No Known Problems Son     No Known Problems Son     Anesth Problems Neg Hx        Current Facility-Administered Medications   Medication Dose Route Frequency    Warfarin: hold warfarin today as INR above 2-3 goal this AM   Other ONCE    0.9% sodium chloride infusion 250 mL  250 mL IntraVENous PRN    pantoprazole (PROTONIX) tablet 40 mg  40 mg Oral ACB&D    erythromycin (ERYC) capsule 250 mg  250 mg Oral TIDAC    epoetin charlie-epbx (RETACRIT) injection 10,000 Units  10,000 Units IntraVENous DIALYSIS [facility]UM DENNY & SAT    bisacodyL (DULCOLAX) tablet 5 mg  5 mg Oral Q12H    polyethylene glycol (MIRALAX) packet 17 g  17 g Oral Q12H    senna-docusate (PERICOLACE) 8.6-50 mg per tablet 1 Tablet  1 Tablet Oral Q12H    Warfarin Pharmacy to dose   Other Rx Dosing/Monitoring    ondansetron (ZOFRAN) injection 8 mg  8 mg IntraVENous Q6H    gentamicin (GARAMYCIN) 0.1 % ointment   Topical DAILY    gentamicin (GARAMYCIN) 0.1 % cream   Topical DIALYSIS PRN    nitroglycerin (NITROSTAT) tablet 0.4 mg  0.4 mg SubLINGual Q5MIN PRN    sodium chloride (NS) flush 5-40 mL  5-40 mL IntraVENous Q8H    sodium chloride (NS) flush 5-40 mL  5-40 mL IntraVENous PRN    acetaminophen (TYLENOL) tablet 650 mg  650 mg Oral Q6H PRN    Or    acetaminophen (TYLENOL) suppository 650 mg  650 mg Rectal Q6H PRN    albuterol-ipratropium (DUO-NEB) 2.5 MG-0.5 MG/3 ML  3 mL Nebulization Q6H PRN    allopurinoL (ZYLOPRIM) tablet 100 mg  100 mg Oral DAILY    atorvastatin (LIPITOR) tablet 80 mg  80 mg Oral QHS    calcitRIOL (ROCALTROL) capsule 0.5 mcg  0.5 mcg Oral DAILY    folic acid (FOLVITE) tablet 1 mg  1 mg Oral DAILY    midodrine (PROAMATINE) tablet 2.5 mg  2.5 mg Oral BID    potassium chloride SR (KLOR-CON 10) tablet 20 mEq  20 mEq Oral DAILY    sucralfate (CARAFATE) tablet 1 g  1 g Oral AC&HS       Objective:     Vitals:    09/08/22 0945 09/08/22 0958 09/08/22 1200 09/08/22 1246   BP: 124/71 113/63  126/63   Pulse: (!) 119 (!) 128 (!) 128 (!) 123   Resp: 16 16  16   Temp:       SpO2:  97%  95%   Weight:       Height:            Intake and Output:  Current Shift: 09/08 0701 - 09/08 1900  In: -   Out: 4565   Last three shifts: 09/06 1901 - 09/08 0700  In: 1260 [P.O.:1260]  Out: -           Gen: Well-developed, well-nourished, in no acute distress  Neck: Supple, No Carotid Bruit,   Resp: No accessory muscle use, Clear breath sounds, No rales or rhonchi  Card: Regular Rate,Rythm,Normal S1, S2, No murmurs, rubs or gallop. No thrills.    Abd:  Soft, non-tender, non-distended,BS+,   MSK: No cyanosis  Skin: No rashes    Neuro: moving all four extremities , follows commands appropriately  Psych:  Good insight, oriented to person, place , alert, Nml Affect  LE: chronic RLE edema, LUE edema     EKG: sinus tachycardia. TELEMETRY -110's     Lab/Data Review: All lab results for the last 24 hours reviewed.      Signed By: Harshil Voss NP     September 8, 2022

## 2022-09-08 NOTE — PROGRESS NOTES
9/8/2022  Case Management Progress Note    11:46 AM  Patient is 61year old female admitted 9/1 with intractable nausea and vomiting  Patient's RUR is 27% red/high risk for readmission  Covid test: negative 9/4   Chart reviewed--patient discussed at IDR rounds  Patient had rapid response called this morning and is being worked up for chest pain. [facility] has received all the records they need and now we only await finalization of patient's chair. Patient is open to [facility] and will resume with them at discharge unless otherwise indicated--patient has declined therapy since admission. Will continue to follow and assist with discharge planning as needed.      Transition of Care Plan   Continue medical management/treatment  Await finalization of dialysis chair  Home with resumption of HH when ready   Follow up outpatient as indicated  Family will transport at [address] [facility] will continue to follow    CHANDA Vargas

## 2022-09-09 LAB
ALBUMIN SERPL-MCNC: 2 G/DL (ref 3.5–5)
ALBUMIN/GLOB SERPL: 0.6 {RATIO} (ref 1.1–2.2)
ALP SERPL-CCNC: 94 U/L (ref 45–117)
ALT SERPL-CCNC: 14 U/L (ref 12–78)
ANION GAP SERPL CALC-SCNC: 8 MMOL/L (ref 5–15)
AST SERPL-CCNC: 16 U/L (ref 15–37)
ATRIAL RATE: 118 BPM
BASOPHILS # BLD: 0.1 K/UL (ref 0–0.1)
BASOPHILS NFR BLD: 1 % (ref 0–1)
BILIRUB SERPL-MCNC: 0.4 MG/DL (ref 0.2–1)
BUN SERPL-MCNC: 18 MG/DL (ref 6–20)
BUN/CREAT SERPL: 3 (ref 12–20)
CALCIUM SERPL-MCNC: 9 MG/DL (ref 8.5–10.1)
CALCULATED P AXIS, ECG09: 41 DEGREES
CALCULATED R AXIS, ECG10: 29 DEGREES
CALCULATED T AXIS, ECG11: 31 DEGREES
CHLORIDE SERPL-SCNC: 103 MMOL/L (ref 97–108)
CO2 SERPL-SCNC: 27 MMOL/L (ref 21–32)
CREAT SERPL-MCNC: 6.27 MG/DL (ref 0.55–1.02)
DIAGNOSIS, 93000: NORMAL
DIFFERENTIAL METHOD BLD: ABNORMAL
EOSINOPHIL # BLD: 0.6 K/UL (ref 0–0.4)
EOSINOPHIL NFR BLD: 7 % (ref 0–7)
ERYTHROCYTE [DISTWIDTH] IN BLOOD BY AUTOMATED COUNT: 15.6 % (ref 11.5–14.5)
GLOBULIN SER CALC-MCNC: 3.3 G/DL (ref 2–4)
GLUCOSE SERPL-MCNC: 81 MG/DL (ref 65–100)
HCT VFR BLD AUTO: 24.1 % (ref 35–47)
HGB BLD-MCNC: 7.6 G/DL (ref 11.5–16)
IMM GRANULOCYTES # BLD AUTO: 0.1 K/UL (ref 0–0.04)
IMM GRANULOCYTES NFR BLD AUTO: 1 % (ref 0–0.5)
INR PPP: 3.2 (ref 0.9–1.1)
LYMPHOCYTES # BLD: 2.2 K/UL (ref 0.8–3.5)
LYMPHOCYTES NFR BLD: 25 % (ref 12–49)
MCH RBC QN AUTO: 31.1 PG (ref 26–34)
MCHC RBC AUTO-ENTMCNC: 31.5 G/DL (ref 30–36.5)
MCV RBC AUTO: 98.8 FL (ref 80–99)
MONOCYTES # BLD: 0.8 K/UL (ref 0–1)
MONOCYTES NFR BLD: 9 % (ref 5–13)
NEUTS SEG # BLD: 5.2 K/UL (ref 1.8–8)
NEUTS SEG NFR BLD: 57 % (ref 32–75)
NRBC # BLD: 0 K/UL (ref 0–0.01)
NRBC BLD-RTO: 0 PER 100 WBC
P-R INTERVAL, ECG05: 182 MS
PLATELET # BLD AUTO: 216 K/UL (ref 150–400)
PMV BLD AUTO: 10.2 FL (ref 8.9–12.9)
POTASSIUM SERPL-SCNC: 3.8 MMOL/L (ref 3.5–5.1)
PROT SERPL-MCNC: 5.3 G/DL (ref 6.4–8.2)
PROTHROMBIN TIME: 30.8 SEC (ref 9–11.1)
Q-T INTERVAL, ECG07: 366 MS
QRS DURATION, ECG06: 80 MS
QTC CALCULATION (BEZET), ECG08: 513 MS
RBC # BLD AUTO: 2.44 M/UL (ref 3.8–5.2)
SODIUM SERPL-SCNC: 138 MMOL/L (ref 136–145)
VENTRICULAR RATE, ECG03: 118 BPM
WBC # BLD AUTO: 8.8 K/UL (ref 3.6–11)

## 2022-09-09 PROCEDURE — 85025 COMPLETE CBC W/AUTO DIFF WBC: CPT

## 2022-09-09 PROCEDURE — 74011250637 HC RX REV CODE- 250/637

## 2022-09-09 PROCEDURE — 65270000029 HC RM PRIVATE

## 2022-09-09 PROCEDURE — 74011250637 HC RX REV CODE- 250/637: Performed by: STUDENT IN AN ORGANIZED HEALTH CARE EDUCATION/TRAINING PROGRAM

## 2022-09-09 PROCEDURE — 99238 HOSP IP/OBS DSCHRG MGMT 30/<: CPT | Performed by: FAMILY MEDICINE

## 2022-09-09 PROCEDURE — 74011250636 HC RX REV CODE- 250/636: Performed by: STUDENT IN AN ORGANIZED HEALTH CARE EDUCATION/TRAINING PROGRAM

## 2022-09-09 PROCEDURE — 80053 COMPREHEN METABOLIC PANEL: CPT

## 2022-09-09 PROCEDURE — 36415 COLL VENOUS BLD VENIPUNCTURE: CPT

## 2022-09-09 PROCEDURE — 94660 CPAP INITIATION&MGMT: CPT

## 2022-09-09 PROCEDURE — 94761 N-INVAS EAR/PLS OXIMETRY MLT: CPT

## 2022-09-09 PROCEDURE — 74011000250 HC RX REV CODE- 250

## 2022-09-09 PROCEDURE — 85610 PROTHROMBIN TIME: CPT

## 2022-09-09 PROCEDURE — 74011250637 HC RX REV CODE- 250/637: Performed by: NURSE PRACTITIONER

## 2022-09-09 RX ORDER — CARVEDILOL 3.12 MG/1
3.12 TABLET ORAL 2 TIMES DAILY WITH MEALS
Qty: 60 TABLET | Refills: 1 | Status: SHIPPED | OUTPATIENT
Start: 2022-09-09

## 2022-09-09 RX ORDER — WARFARIN 1 MG/1
1 TABLET ORAL ONCE
Qty: 1 TABLET | Refills: 0 | Status: SHIPPED | OUTPATIENT
Start: 2022-09-09 | End: 2022-09-10 | Stop reason: SDUPTHER

## 2022-09-09 RX ORDER — ISOSORBIDE MONONITRATE 30 MG/1
30 TABLET, EXTENDED RELEASE ORAL DAILY
Qty: 30 TABLET | Refills: 1 | Status: SHIPPED | OUTPATIENT
Start: 2022-09-09

## 2022-09-09 RX ORDER — WARFARIN 1 MG/1
1 TABLET ORAL ONCE
Status: COMPLETED | OUTPATIENT
Start: 2022-09-09 | End: 2022-09-09

## 2022-09-09 RX ADMIN — CARVEDILOL 3.12 MG: 3.12 TABLET, FILM COATED ORAL at 10:14

## 2022-09-09 RX ADMIN — CALCITRIOL CAPSULES 0.25 MCG 0.5 MCG: 0.25 CAPSULE ORAL at 10:14

## 2022-09-09 RX ADMIN — FOLIC ACID 1 MG: 1 TABLET ORAL at 10:14

## 2022-09-09 RX ADMIN — CARVEDILOL 3.12 MG: 3.12 TABLET, FILM COATED ORAL at 16:03

## 2022-09-09 RX ADMIN — SODIUM CHLORIDE, PRESERVATIVE FREE 10 ML: 5 INJECTION INTRAVENOUS at 18:17

## 2022-09-09 RX ADMIN — WARFARIN SODIUM 1 MG: 1 TABLET ORAL at 18:17

## 2022-09-09 RX ADMIN — SUCRALFATE 1 G: 1 TABLET ORAL at 06:32

## 2022-09-09 RX ADMIN — GENTAMICIN SULFATE: 1 OINTMENT TOPICAL at 16:05

## 2022-09-09 RX ADMIN — ONDANSETRON 8 MG: 2 INJECTION INTRAMUSCULAR; INTRAVENOUS at 18:17

## 2022-09-09 RX ADMIN — DOCUSATE SODIUM 50MG AND SENNOSIDES 8.6MG 1 TABLET: 8.6; 5 TABLET, FILM COATED ORAL at 10:14

## 2022-09-09 RX ADMIN — ALLOPURINOL 100 MG: 100 TABLET ORAL at 10:14

## 2022-09-09 RX ADMIN — Medication 5 ML: at 06:10

## 2022-09-09 RX ADMIN — SUCRALFATE 1 G: 1 TABLET ORAL at 16:02

## 2022-09-09 RX ADMIN — POTASSIUM CHLORIDE 20 MEQ: 750 TABLET, FILM COATED, EXTENDED RELEASE ORAL at 10:13

## 2022-09-09 RX ADMIN — DOCUSATE SODIUM 50MG AND SENNOSIDES 8.6MG 1 TABLET: 8.6; 5 TABLET, FILM COATED ORAL at 21:23

## 2022-09-09 RX ADMIN — BISACODYL 5 MG: 5 TABLET, DELAYED RELEASE ORAL at 10:14

## 2022-09-09 RX ADMIN — ISOSORBIDE MONONITRATE 30 MG: 30 TABLET, EXTENDED RELEASE ORAL at 10:14

## 2022-09-09 RX ADMIN — Medication 10 ML: at 21:23

## 2022-09-09 RX ADMIN — ONDANSETRON 8 MG: 2 INJECTION INTRAMUSCULAR; INTRAVENOUS at 23:29

## 2022-09-09 RX ADMIN — PANTOPRAZOLE SODIUM 40 MG: 40 TABLET, DELAYED RELEASE ORAL at 16:02

## 2022-09-09 RX ADMIN — ERYTHROMYCIN 250 MG: 250 CAPSULE, DELAYED RELEASE PELLETS ORAL at 06:35

## 2022-09-09 RX ADMIN — ONDANSETRON 8 MG: 2 INJECTION INTRAMUSCULAR; INTRAVENOUS at 12:38

## 2022-09-09 RX ADMIN — Medication 10 ML: at 12:38

## 2022-09-09 RX ADMIN — PANTOPRAZOLE SODIUM 40 MG: 40 TABLET, DELAYED RELEASE ORAL at 06:32

## 2022-09-09 RX ADMIN — SUCRALFATE 1 G: 1 TABLET ORAL at 12:38

## 2022-09-09 RX ADMIN — POLYETHYLENE GLYCOL 3350 17 G: 17 POWDER, FOR SOLUTION ORAL at 12:38

## 2022-09-09 RX ADMIN — DICLOFENAC SODIUM 2 G: 10 GEL TOPICAL at 23:29

## 2022-09-09 RX ADMIN — SUCRALFATE 1 G: 1 TABLET ORAL at 21:23

## 2022-09-09 RX ADMIN — DICLOFENAC SODIUM 2 G: 10 GEL TOPICAL at 16:03

## 2022-09-09 RX ADMIN — ONDANSETRON 8 MG: 2 INJECTION INTRAMUSCULAR; INTRAVENOUS at 06:09

## 2022-09-09 RX ADMIN — ATORVASTATIN CALCIUM 80 MG: 20 TABLET, FILM COATED ORAL at 21:23

## 2022-09-09 RX ADMIN — ERYTHROMYCIN 250 MG: 250 CAPSULE, DELAYED RELEASE PELLETS ORAL at 16:02

## 2022-09-09 RX ADMIN — DICLOFENAC SODIUM 2 G: 10 GEL TOPICAL at 06:09

## 2022-09-09 RX ADMIN — BISACODYL 5 MG: 5 TABLET, DELAYED RELEASE ORAL at 21:23

## 2022-09-09 NOTE — PROGRESS NOTES
Problem: Falls - Risk of  Goal: *Absence of Falls  Description: Document Clydene Bone Fall Risk and appropriate interventions in the flowsheet. 9/9/2022 0037 by Shayne Bailey RN  Outcome: Progressing Towards Goal  Note: Fall Risk Interventions:  Mobility Interventions: Bed/chair exit alarm         Medication Interventions: Patient to call before getting OOB, Teach patient to arise slowly    Elimination Interventions: Call light in reach, Patient to call for help with toileting needs, Elevated toilet seat    History of Falls Interventions: Bed/chair exit alarm      9/9/2022 0035 by Shayne Bailey RN  Outcome: Progressing Towards Goal  Note: Fall Risk Interventions:  Mobility Interventions: Bed/chair exit alarm         Medication Interventions: Patient to call before getting OOB, Teach patient to arise slowly    Elimination Interventions: Call light in reach, Patient to call for help with toileting needs, Elevated toilet seat    History of Falls Interventions: Bed/chair exit alarm         Problem: Pressure Injury - Risk of  Goal: *Prevention of pressure injury  Description: Document Randy Scale and appropriate interventions in the flowsheet.   9/9/2022 0037 by Shayne Bailey RN  Outcome: Progressing Towards Goal  Note: Pressure Injury Interventions:  Sensory Interventions: Assess changes in LOC, Check visual cues for pain    Moisture Interventions: Absorbent underpads, Minimize layers    Activity Interventions: Assess need for specialty bed    Mobility Interventions: Assess need for specialty bed, HOB 30 degrees or less    Nutrition Interventions: Document food/fluid/supplement intake, Offer support with meals,snacks and hydration    Friction and Shear Interventions: HOB 30 degrees or less, Lift sheet, Minimize layers             9/9/2022 0035 by Shayne Bailey RN  Outcome: Progressing Towards Goal  Note: Pressure Injury Interventions:  Sensory Interventions: Assess changes in LOC, Check visual cues for pain    Moisture Interventions: Absorbent underpads, Minimize layers    Activity Interventions: Assess need for specialty bed    Mobility Interventions: Assess need for specialty bed, HOB 30 degrees or less    Nutrition Interventions: Document food/fluid/supplement intake, Offer support with meals,snacks and hydration    Friction and Shear Interventions: HOB 30 degrees or less, Lift sheet, Minimize layers

## 2022-09-09 NOTE — PROGRESS NOTES
Bedside [facility] by Nicole Handley (offgoing nurse). Report included the following information SBAR, Kardex, MAR, and Recent Results.

## 2022-09-09 NOTE — PROGRESS NOTES
Problem: Falls - Risk of  Goal: *Absence of Falls  Description: Document Newmancole Muir Fall Risk and appropriate interventions in the flowsheet. Outcome: Progressing Towards Goal  Note: Fall Risk Interventions:  Mobility Interventions: Bed/chair exit alarm         Medication Interventions: Patient to call before getting OOB, Teach patient to arise slowly    Elimination Interventions: Call light in reach, Patient to call for help with toileting needs, Elevated toilet seat    History of Falls Interventions: Bed/chair exit alarm         Problem: Pressure Injury - Risk of  Goal: *Prevention of pressure injury  Description: Document Randy Scale and appropriate interventions in the flowsheet.   Outcome: Progressing Towards Goal  Note: Pressure Injury Interventions:  Sensory Interventions: Assess changes in LOC, Check visual cues for pain    Moisture Interventions: Absorbent underpads, Minimize layers    Activity Interventions: Assess need for specialty bed    Mobility Interventions: Assess need for specialty bed, HOB 30 degrees or less    Nutrition Interventions: Document food/fluid/supplement intake, Offer support with meals,snacks and hydration    Friction and Shear Interventions: HOB 30 degrees or less, Lift sheet, Minimize layers

## 2022-09-09 NOTE — PROGRESS NOTES
Problem: Falls - Risk of  Goal: *Absence of Falls  Description: Document Myra Lisbet Fall Risk and appropriate interventions in the flowsheet.   Outcome: Progressing Towards Goal  Note: Fall Risk Interventions:  Mobility Interventions: Patient to call before getting OOB         Medication Interventions: Patient to call before getting OOB, Teach patient to arise slowly    Elimination Interventions: Call light in reach    History of Falls Interventions: Bed/chair exit alarm

## 2022-09-09 NOTE — PROGRESS NOTES
ppai  [address] [facility] Tracey [place] 35   Office [phone]  Fax [phone]          Subjective / Objective     Subjective  Pt had L arm swelling and pain overnight, duplex was done and was negative for DVT. Patient will have to stay to get dialysis tomorrow morning before discharge as she does not have a dialysis chair until Tuesday. Patient seen and examined at bedside. She says her abdominal pain is off and on. She says she wants to go home. Other symptoms unchanged from yesterday (chest pain, headache). No new complaints. Respiratory: O2 Device: None (Room air) (denies sob) 2L, sating 100%  Visit Vitals  /66   Pulse (!) 104   Temp 98.1 °F (36.7 °C)   Resp 16   Ht 5' 10\" (1.778 m)   Wt 277 lb (125.6 kg)   SpO2 99%   BMI 39.75 kg/m²     Physical Examination:   General appearance - alert, in no acute distress. Chest - clear to auscultation, no wheezes, rales or rhonchi, symmetric air entry. Heart - normal rate, regular rhythm, normal S1, S2, no murmurs, rubs, clicks or gallops  Abdomen - soft, nondistended, no masses or organomegaly. No guarding. TTP in epigastric region. Neurological - alert, oriented, normal speech, no focal findings  Skin - warm, dry. No notable rashes  Extremities - No clubbing or cyanosis. Moving all extremities spontaneously.   Psychiatric - normal speech and thought processes    I/O:  09/08 0701 - 09/09 0700  In: 1280 [P.O.:1280]  Out: 9294   Inpatient Medications  Current Facility-Administered Medications   Medication    warfarin (COUMADIN) tablet 1 mg    carvediloL (COREG) tablet 3.125 mg    isosorbide mononitrate ER (IMDUR) tablet 30 mg    diclofenac (VOLTAREN) 1 % topical gel 2 g    0.9% sodium chloride infusion 250 mL    pantoprazole (PROTONIX) tablet 40 mg    erythromycin [facility]) capsule 250 mg    epoetin charlie-epbx (RETACRIT) injection 10,000 Units    bisacodyL (DULCOLAX) tablet 5 mg    polyethylene glycol (MIRALAX) packet 17 g    senna-docusate (PERICOLACE) 8.6-50 mg per tablet 1 Tablet    Warfarin Pharmacy to dose    ondansetron (ZOFRAN) injection 8 mg    gentamicin (GARAMYCIN) 0.1 % ointment    gentamicin (GARAMYCIN) 0.1 % cream    nitroglycerin (NITROSTAT) tablet 0.4 mg    sodium chloride (NS) flush 5-40 mL    sodium chloride (NS) flush 5-40 mL    acetaminophen (TYLENOL) tablet 650 mg    Or    acetaminophen (TYLENOL) suppository 650 mg    albuterol-ipratropium (DUO-NEB) 2.5 MG-0.5 MG/3 ML    allopurinoL (ZYLOPRIM) tablet 100 mg    atorvastatin (LIPITOR) tablet 80 mg    calcitRIOL (ROCALTROL) capsule 0.5 mcg    folic acid (FOLVITE) tablet 1 mg    midodrine (PROAMATINE) tablet 2.5 mg    potassium chloride SR (KLOR-CON 10) tablet 20 mEq    sucralfate (CARAFATE) tablet 1 g     Current Facility-Administered Medications   Medication Dose Route Frequency    warfarin (COUMADIN) tablet 1 mg  1 mg Oral ONCE    carvediloL (COREG) tablet 3.125 mg  3.125 mg Oral BID WITH MEALS    isosorbide mononitrate ER (IMDUR) tablet 30 mg  30 mg Oral DAILY    diclofenac (VOLTAREN) 1 % topical gel 2 g  2 g Topical QID    0.9% sodium chloride infusion 250 mL  250 mL IntraVENous PRN    pantoprazole (PROTONIX) tablet 40 mg  40 mg Oral ACB&D    erythromycin (ERYC) capsule 250 mg  250 mg Oral TIDAC    epoetin charlie-epbx (RETACRIT) injection 10,000 Units  10,000 Units IntraVENous DIALYSIS TUE, THU & SAT    bisacodyL (DULCOLAX) tablet 5 mg  5 mg Oral Q12H    polyethylene glycol (MIRALAX) packet 17 g  17 g Oral Q12H    senna-docusate (PERICOLACE) 8.6-50 mg per tablet 1 Tablet  1 Tablet Oral Q12H    Warfarin Pharmacy to dose   Other Rx Dosing/Monitoring    ondansetron (ZOFRAN) injection 8 mg  8 mg IntraVENous Q6H    gentamicin (GARAMYCIN) 0.1 % ointment   Topical DAILY    gentamicin (GARAMYCIN) 0.1 % cream   Topical DIALYSIS PRN    nitroglycerin (NITROSTAT) tablet 0.4 mg  0.4 mg SubLINGual Q5MIN PRN    sodium chloride (NS) flush 5-40 mL  5-40 mL IntraVENous Q8H    sodium chloride (NS) flush 5-40 mL  5-40 mL IntraVENous PRN    acetaminophen (TYLENOL) tablet 650 mg  650 mg Oral Q6H PRN    Or    acetaminophen (TYLENOL) suppository 650 mg  650 mg Rectal Q6H PRN    albuterol-ipratropium (DUO-NEB) 2.5 MG-0.5 MG/3 ML  3 mL Nebulization Q6H PRN    allopurinoL (ZYLOPRIM) tablet 100 mg  100 mg Oral DAILY    atorvastatin (LIPITOR) tablet 80 mg  80 mg Oral QHS    calcitRIOL (ROCALTROL) capsule 0.5 mcg  0.5 mcg Oral DAILY    folic acid (FOLVITE) tablet 1 mg  1 mg Oral DAILY    midodrine (PROAMATINE) tablet 2.5 mg  2.5 mg Oral BID    potassium chloride SR (KLOR-CON 10) tablet 20 mEq  20 mEq Oral DAILY    sucralfate (CARAFATE) tablet 1 g  1 g Oral AC&HS     Allergies  Allergies   Allergen Reactions    Contrast Dye [Iodine] Anaphylaxis     Tolerates when pre medicated w/ IV solumedrol and benadryl prior to procedure     Shellfish Derived Anaphylaxis    Levaquin [Levofloxacin] Nausea and Vomiting    Morphine Hives and Itching    Nsaids (Non-Steroidal Anti-Inflammatory Drug) Nausea and Vomiting     CBC:  Recent Labs     09/09/22  0552 09/08/22  0331 09/07/22  0242   WBC 8.8 11.8* 10.9   HGB 7.6* 8.2* 7.8*    237 432       Metabolic Panel:  Recent Labs     09/09/22  0552 09/08/22  1412 09/08/22  0331 09/07/22  0242    138 136 136   K 3.8 3.9 3.5 4.1    102 103 101   CO2 27 31 25 26   BUN 18 15 33* 30*   CREA 6.27* 5.29* 8.74* 7.41*   GLU 81 113* 91 86   CA 9.0 9.4 9.2  9.3 9.0   MG  --  1.6 1.6  --    PHOS  --   --  2.6  --    ALB 2.0*  --  2.3*  --    ALT 14  --  15  --    INR 3.2*  --  3.8* 2.8*       Imaging/procedures:   CT ABD PELV WO CONT    Result Date: 9/1/2022  1. Increasing moderate volume ascites. Right abdominal peritoneal catheter. 2.  Grossly unchanged right mid renal indeterminate lesion in several additional bilateral likely cystic lesions as described above. 3.  Bilateral nonobstructive nephrolithiasis. 4.  Cholecystectomy.  5.  Evidence of chronic interstitial lung disease. XR CHEST PORT    Result Date: 9/8/2022  Similar to slightly worsening diffuse hazy reticulonodular opacities may reflect edema or atypical infectious process. Additional right upper lobe patchy opacity and bronchial ectatic changes similar. Stable small right pleural effusion. XR CHEST PORT    Result Date: 9/5/2022  1. No interval change in bilateral interstitial and alveolar opacities           Assessment and Plan     Natalie Lazo is a 61 y.o. female with PMH ESRD on PD, CAD with MI 2012, HFpEF, ILD on 2L home O2, JASEN, GERD, h/o DVT in RLE on warfarin, HTN, HLD, admitted with intractable n/v and epigastric pain. Intractable nausea and vomiting with epigastric abd pain: CT AP showed increasing moderate volume ascites, unchanged b/l nonobstructive nephrolithiasis. Possibly 2/2 gastroparesis vs CHF vs uremia. Peritoneal fluid cx no growth. GI no need for endoscopy. -Zofran, Protonix, Erythromycin, senna, dulcolax  -GI consulted, appreciate recommendations  -Nephrology consulted, appreciate recommendations  -Nutrition OP upon d/c    Anemia: Improved s/p 1u pRBC on 9/6. (BL 10). - Monitor for symptoms, signs of bleeding  - Monitor CBC  - ALDO per nephro     Chronic hypokalemia: Baseline 3.3. On KCl 20 meq daily at home. Likely 2/2 continued n/v. EKG wnl.  -Continue home KCl 20 meq daily   -Monitor BMP    Supratherapeutic INR on warfarin: (Resolved) INR 13.5 on 9/3, no longer elevated. -Per pharmacy, pt take 1mg warfarin until PT/INR re-evaluated Monday  -Monitor PT/INR closely outpatient  -Consider switching to DOAC     Hypotension in the setting of HTN: recently taken off of home carvedilol 6.25 mg and Imdur 120 mg CR by her PCP due to low BPs. Also started on midodrine 2.5 mg BID. -Hold home carvedilol 6.25 mg and Imdur 120 mg CR  -Continue midodrine 2.5 mg BID  -Will continue to monitor at this time and readjust as BP's trend.      ESRD on Dialysis:  Follows with Dr Christina Hernandez (nephrology). -Consult nephrology, appreciate recommendations  - Per pt preference, switched from PD to HD, TTS  -Avoid Nephrotoxins  -Renally dose medications  -Continue Calcitriol 0.5mg daily     Coronary artery disease with history of MI: 1 SAL to OM 2004, MI in 2012 s/p 2 SAL to OM2/OM3. Has chronic proximal occlusion with L to R collaterals. Follows with Janette Palumbo (cardiologist). Echo with EF 60-65%. Worsening CP and tachycardia 9/5, but EKG, trop, CXR wnl.  - Remote tele  - Cont home lipitor 80mg QHS  - Cardiology consulted     Chronic HFpEF: Per chart review last echo in 2019 (EF 66-70%). Pt reports recent admission to outside hospital for diuresis. Echo EF 60-65%. -Monitor for signs/symptoms of HF     Interstitial lung disease: With chronic hypoxia on 2 L oxygen via nasal cannula at home. Biopsy negative for sarcoid per patient. Attributed to smoking history. - Duonebs prn  - Humidified air  - Home 2L O2     Chronic sacral ulcer: painful.   -Consult inpatient wound care     T2DM with CKD: Last HgA1C 4.7 (3/25/22). Not on any medications currently. - Monitor daily labs     GERD Stable. -Continue home sucralfate   -Protonix 40 IV daily     Hx of DVT: Of right leg. No recurrence. Home warfarin 1 mg MWF with 2.5 mg Tue Thur Sat.   -Per pharmacy, pt take 1mg warfarin until PT/INR re-evaluated Monday  -Monitor PT/INR closely outpatient  -Consider switching to DOAC     History of gout: Takes allopurinol 100 mg daily. Has not had a recent flare.   -Continue home allopurinol 100 mg daily     Intermittent constipation: Takes docusate sodium daily. -MiraLAX PRN     Hyperlipidemia: Total 100, TG 78, HDL 51, LDL 33.4.   -Continue home Lipitor 80 mg nightly    Obstructive sleep apnea: Uses CPAP nightly, well-controlled at this time. Former smoker: 0.5 PPD for 41 years for 20.5-pack-year history. Quit on 11/9/2014. Obesity: PT with BMI 38.05 kg/m².   - Encouraging lifestyle modifications and further follow up outpatient.     Patient discussed with MD Yogesh Nettles MD  Family Medicine Resident       For Billing    Chief Complaint   Patient presents with    Nausea    Vomiting    Fatigue       Hospital Problems  Date Reviewed: 9/2/2022            Codes Class Noted POA    * (Principal) Intractable vomiting with nausea ICD-10-CM: R11.2  ICD-9-CM: 536.2  9/2/2022 Yes        Hypokalemia ICD-10-CM: E87.6  ICD-9-CM: 276.8  7/23/2022 Yes        Supratherapeutic INR ICD-10-CM: R79.1  ICD-9-CM: 790.92  9/2/2022 Yes        Hyponatremia ICD-10-CM: E87.1  ICD-9-CM: 276.1  9/2/2022 Yes        Abdominal pain ICD-10-CM: R10.9  ICD-9-CM: 789.00  7/23/2022 Yes        (HFpEF) heart failure with preserved ejection fraction ([facility] ICD-10-CM: I50.30  ICD-9-CM: 428.9  9/23/2018 Yes        ESRD on peritoneal dialysis [facility]) ICD-10-CM: N18.6, Z99.2  ICD-9-CM: 585.6, V45.11  6/23/2018 Yes        Hx of deep venous thrombosis ICD-10-CM: Z86.718  ICD-9-CM: V12.51  5/30/2018 Yes        Diabetic gastroparesis ([facility] ICD-10-CM: E11.43, K31.84  ICD-9-CM: 250.60, 536.3  Unknown Yes        GERD (gastroesophageal reflux disease) ICD-10-CM: K21.9  ICD-9-CM: 530.81  Unknown Yes        DM type 2 causing neurological disease ([facility] ICD-10-CM: E11.49  ICD-9-CM: 250.60  Unknown Yes        Gout ICD-10-CM: M10.9  ICD-9-CM: 274.9  Unknown Yes        ILD (interstitial lung disease) ([facility] ICD-10-CM: J84.9  ICD-9-CM: 444  8/20/2017 Yes        Warfarin anticoagulation ICD-10-CM: Z79.01  ICD-9-CM: V58.61  8/20/2017 Yes        HTN (hypertension) (Chronic) ICD-10-CM: I10  ICD-9-CM: 401.9  10/1/2012 Yes        Coronary artery disease (Chronic) ICD-10-CM: I25.10  ICD-9-CM: 414.00  2/16/2011 Yes    Overview Addendum 10/5/2012  7:33 AM by Stanley Tucker, [address] 2004  1v CAD by cath - PCI OM with SAL  Cath 5/2004 - patent stent, no new disease  Lexiscan cardiolite 12/09- normal perfusion, EF 66%  Cath: 3/19/12: PA 55/30 mean 41, wedge 26, RA 24, EDP 35, LVG 55%, LM normal, LAD normal, LCX - OM1 patent stent, OM2 prox 85% long, % with L to R collaterals                JASEN on CPAP (Chronic) ICD-10-CM: G47.33, Z99.89  ICD-9-CM: 327.23, V46.8  2/16/2011 Yes    Overview Signed 3/21/2012  8:04 AM by Jennifer Angel CPAP

## 2022-09-09 NOTE — PROGRESS NOTES
9/9/2022  Case Management Progress Note    11:49 AM  Patient is 61year old female admitted 9/1 with intractable nausea and vomiting  Patient's RUR is 24% red/high risk for readmission  Covid test: negative 9/4   Chart reviewed--patient discussed at IDR rounds  Patient's dialysis chair is finalized for TTS at [facility]. Appreciate fellow CM Zoey for delivering patient's welcome letter. I have let [facility] know that patient is discharging today. I have also resumed her home health with [facility]. Patient does not have any other noted needs at this time and we plan to discharge her today.      Transition of Care Plan   Discharge today pending order  Home health resumed with [facility]  HD set up at [facility] will transport at discharge   Follow up outpatient as indicated   OK for discharge from CM standpoint

## 2022-09-09 NOTE — PROGRESS NOTES
09/09/22      Medicare pt has received, reviewed, and signed 2nd IM letter informing them of their right to appeal the discharge. Signed copied has been placed on pt bedside chart.

## 2022-09-09 NOTE — PROGRESS NOTES
Pt went off the floor for an US of her L arm do to intermittent pain, swelling. Md evaluated pt at the bedside.

## 2022-09-09 NOTE — PROGRESS NOTES
[address] [place]  YOB: 1955          Assessment & Plan:     ESRD on PD at [facility]  N/V ?gastroparesis ?uremia/underdialysis ?other  Hypotension  Hypokalemia  Anemia    Rec:  HD yesterday. HD TTS for now  ALDO with HD  Awaiting outpt HD at [facility]  She wants to keep PD cath and maybe re-try in the future. Will follow up with [place] [facility] for flushing etc. I am not sure that PD is going to work. Subjective:   CC: ESRD  HPI: HD yest vero well.  Wants to go home  ROS: improved abd pain, no vomiting  Current Facility-Administered Medications   Medication Dose Route Frequency    carvediloL (COREG) tablet 3.125 mg  3.125 mg Oral BID WITH MEALS    isosorbide mononitrate ER (IMDUR) tablet 30 mg  30 mg Oral DAILY    diclofenac (VOLTAREN) 1 % topical gel 2 g  2 g Topical QID    0.9% sodium chloride infusion 250 mL  250 mL IntraVENous PRN    pantoprazole (PROTONIX) tablet 40 mg  40 mg Oral ACB&D    erythromycin (ERYC) capsule 250 mg  250 mg Oral TIDAC    epoetin charlie-epbx (RETACRIT) injection 10,000 Units  10,000 Units IntraVENous DIALYSIS TUE, THU & SAT    bisacodyL (DULCOLAX) tablet 5 mg  5 mg Oral Q12H    polyethylene glycol (MIRALAX) packet 17 g  17 g Oral Q12H    senna-docusate (PERICOLACE) 8.6-50 mg per tablet 1 Tablet  1 Tablet Oral Q12H    Warfarin Pharmacy to dose   Other Rx Dosing/Monitoring    ondansetron (ZOFRAN) injection 8 mg  8 mg IntraVENous Q6H    gentamicin (GARAMYCIN) 0.1 % ointment   Topical DAILY    gentamicin (GARAMYCIN) 0.1 % cream   Topical DIALYSIS PRN    nitroglycerin (NITROSTAT) tablet 0.4 mg  0.4 mg SubLINGual Q5MIN PRN    sodium chloride (NS) flush 5-40 mL  5-40 mL IntraVENous Q8H    sodium chloride (NS) flush 5-40 mL  5-40 mL IntraVENous PRN    acetaminophen (TYLENOL) tablet 650 mg  650 mg Oral Q6H PRN    Or    acetaminophen (TYLENOL) suppository 650 mg  650 mg Rectal Q6H PRN    albuterol-ipratropium (DUO-NEB) 2.5 MG-0.5 MG/3 ML  3 mL Nebulization Q6H PRN    allopurinoL (ZYLOPRIM) tablet 100 mg  100 mg Oral DAILY    atorvastatin (LIPITOR) tablet 80 mg  80 mg Oral QHS    calcitRIOL (ROCALTROL) capsule 0.5 mcg  0.5 mcg Oral DAILY    folic acid (FOLVITE) tablet 1 mg  1 mg Oral DAILY    midodrine (PROAMATINE) tablet 2.5 mg  2.5 mg Oral BID    potassium chloride SR (KLOR-CON 10) tablet 20 mEq  20 mEq Oral DAILY    sucralfate (CARAFATE) tablet 1 g  1 g Oral AC&HS          Objective:     Vitals:  Blood pressure (!) 142/78, pulse 96, temperature 98.2 °F (36.8 °C), resp. rate 16, height 5' 10\" (1.778 m), weight 125.6 kg (277 lb), SpO2 99 %. Temp (24hrs), Av.3 °F (36.8 °C), Min:98 °F (36.7 °C), Max:98.6 °F (37 °C)      Intake and Output:  No intake/output data recorded.  1901 -  0700  In: 1580 [P.O.:1580]  Out: 1295     Physical Exam:               GENERAL ASSESSMENT: NAD  HEENT: Nontraumatic   CHEST: Clear  HEART: Reg  ABDOMEN: Soft,NT  EXTREMITY: +EDEMA; L FA AVF  NEURO: Grossly non focal          ECG/rhythm:    Data Review      No results for input(s): TNIPOC in the last 72 hours.     No lab exists for component: ITNL   Recent Labs     22  0950   CPK 28     Recent Labs     22  0552 22  1412 22  0331 22  0242    138 136 136   K 3.8 3.9 3.5 4.1    102 103 101   CO2 27 31 25 26   BUN 18 15 33* 30*   CREA 6.27* 5.29* 8.74* 7.41*   GLU 81 113* 91 86   PHOS  --   --  2.6  --    MG  --  1.6 1.6  --    CA 9.0 9.4 9.2  9.3 9.0   ALB 2.0*  --  2.3*  --    WBC 8.8  --  11.8* 10.9   HGB 7.6*  --  8.2* 7.8*   HCT 24.1*  --  25.6* 24.5*     --  237 201        Recent Labs     22  0552 22  0331 22  0242   INR 3.2* 3.8* 2.8*   PTP 30.8* 36.7* 27.0*       Needs: urine analysis, urine sodium, protein and creatinine  Lab Results   Component Value Date/Time    Sodium,urine random 25 11/10/2014 12:40 PM    Creatinine, urine 145.29 2017 05:01 AM           : Kimberli Sheikh Olivia Mac MD  9/9/2022        [facility]:  [email or web address]  Caren Yeboah office:  [address] [address]  [address]  Phone: [phone]  Fax :     [phone]    Nani office:  [address] [facility]  [place] [facility]  Phone - [phone]  Fax - [phone]

## 2022-09-09 NOTE — PROGRESS NOTES
[facility] Dosing Services: 9/9/22    Consult for Warfarin Dosing by Pharmacy by Dr. Harvey Law provided for this 61 y.o.  female , for indication of History of Venous Thrombosis (confirmed no current VTE). Day of Therapy Resumed from PTA medication list (1 mg on MWF, 2.5 mg rest of week)  Dose to achieve an INR goal of 2-3    Order entered for  Warfarin  1 (mg) ordered to be given today at 18:00. Discussed reducing dose on Medical Resident rounds to 1 mg PO daily while on both allopurinol and erythromycin. Recommended patient follow up outpatient on Monday if discharged today. Significant drug interactions: allopurinol (home med), erythromycin   Previous dose given HELD on 9/8  HELD on 9/7  2.5mg on 9/6  1 mg on 9/5  2.5 mg on 9/4  2.5 mg on 9/3   PT/INR Lab Results   Component Value Date/Time    INR 3.2 (H) 09/09/2022 05:52 AM      Platelets Lab Results   Component Value Date/Time    PLATELET 836 28/87/2180 05:52 AM      H/H Lab Results   Component Value Date/Time    HGB 7.6 (L) 09/09/2022 05:52 AM        Pharmacy to follow daily and will provide subsequent Warfarin dosing based on clinical status.   JOHNSON Pennington)  Contact information [phone]

## 2022-09-10 VITALS
OXYGEN SATURATION: 97 % | HEIGHT: 70 IN | DIASTOLIC BLOOD PRESSURE: 62 MMHG | HEART RATE: 106 BPM | WEIGHT: 277 LBS | SYSTOLIC BLOOD PRESSURE: 129 MMHG | RESPIRATION RATE: 20 BRPM | BODY MASS INDEX: 39.65 KG/M2 | TEMPERATURE: 98.3 F

## 2022-09-10 LAB
ALBUMIN SERPL-MCNC: 2.2 G/DL (ref 3.5–5)
ALBUMIN/GLOB SERPL: 0.7 {RATIO} (ref 1.1–2.2)
ALP SERPL-CCNC: 94 U/L (ref 45–117)
ALT SERPL-CCNC: 13 U/L (ref 12–78)
ANION GAP SERPL CALC-SCNC: 9 MMOL/L (ref 5–15)
AST SERPL-CCNC: 11 U/L (ref 15–37)
BASOPHILS # BLD: 0.1 K/UL (ref 0–0.1)
BASOPHILS NFR BLD: 1 % (ref 0–1)
BILIRUB SERPL-MCNC: 0.4 MG/DL (ref 0.2–1)
BUN SERPL-MCNC: 21 MG/DL (ref 6–20)
BUN/CREAT SERPL: 3 (ref 12–20)
CALCIUM SERPL-MCNC: 9 MG/DL (ref 8.5–10.1)
CHLORIDE SERPL-SCNC: 105 MMOL/L (ref 97–108)
CO2 SERPL-SCNC: 25 MMOL/L (ref 21–32)
COPPER SERPL-MCNC: 104 UG/DL (ref 80–158)
CREAT SERPL-MCNC: 7.65 MG/DL (ref 0.55–1.02)
DIFFERENTIAL METHOD BLD: ABNORMAL
EOSINOPHIL # BLD: 0.6 K/UL (ref 0–0.4)
EOSINOPHIL NFR BLD: 7 % (ref 0–7)
ERYTHROCYTE [DISTWIDTH] IN BLOOD BY AUTOMATED COUNT: 15.6 % (ref 11.5–14.5)
GLOBULIN SER CALC-MCNC: 3 G/DL (ref 2–4)
GLUCOSE SERPL-MCNC: 87 MG/DL (ref 65–100)
HCT VFR BLD AUTO: 23.7 % (ref 35–47)
HGB BLD-MCNC: 7.3 G/DL (ref 11.5–16)
IMM GRANULOCYTES # BLD AUTO: 0 K/UL (ref 0–0.04)
IMM GRANULOCYTES NFR BLD AUTO: 0 % (ref 0–0.5)
INR PPP: 2.6 (ref 0.9–1.1)
LYMPHOCYTES # BLD: 2.1 K/UL (ref 0.8–3.5)
LYMPHOCYTES NFR BLD: 26 % (ref 12–49)
MCH RBC QN AUTO: 30.5 PG (ref 26–34)
MCHC RBC AUTO-ENTMCNC: 30.8 G/DL (ref 30–36.5)
MCV RBC AUTO: 99.2 FL (ref 80–99)
MONOCYTES # BLD: 0.8 K/UL (ref 0–1)
MONOCYTES NFR BLD: 9 % (ref 5–13)
NEUTS SEG # BLD: 4.7 K/UL (ref 1.8–8)
NEUTS SEG NFR BLD: 57 % (ref 32–75)
NRBC # BLD: 0 K/UL (ref 0–0.01)
NRBC BLD-RTO: 0 PER 100 WBC
PLATELET # BLD AUTO: 225 K/UL (ref 150–400)
PMV BLD AUTO: 10.5 FL (ref 8.9–12.9)
POTASSIUM SERPL-SCNC: 3.3 MMOL/L (ref 3.5–5.1)
PROT SERPL-MCNC: 5.2 G/DL (ref 6.4–8.2)
PROTHROMBIN TIME: 25.6 SEC (ref 9–11.1)
RBC # BLD AUTO: 2.39 M/UL (ref 3.8–5.2)
SODIUM SERPL-SCNC: 139 MMOL/L (ref 136–145)
WBC # BLD AUTO: 8.2 K/UL (ref 3.6–11)
ZINC SERPL-MCNC: 89 UG/DL (ref 44–115)

## 2022-09-10 PROCEDURE — 74011000250 HC RX REV CODE- 250

## 2022-09-10 PROCEDURE — 74011250637 HC RX REV CODE- 250/637

## 2022-09-10 PROCEDURE — 94761 N-INVAS EAR/PLS OXIMETRY MLT: CPT

## 2022-09-10 PROCEDURE — 2709999900 HC NON-CHARGEABLE SUPPLY

## 2022-09-10 PROCEDURE — 74011250637 HC RX REV CODE- 250/637: Performed by: STUDENT IN AN ORGANIZED HEALTH CARE EDUCATION/TRAINING PROGRAM

## 2022-09-10 PROCEDURE — 74011250636 HC RX REV CODE- 250/636: Performed by: INTERNAL MEDICINE

## 2022-09-10 PROCEDURE — 80053 COMPREHEN METABOLIC PANEL: CPT

## 2022-09-10 PROCEDURE — 74011250636 HC RX REV CODE- 250/636: Performed by: STUDENT IN AN ORGANIZED HEALTH CARE EDUCATION/TRAINING PROGRAM

## 2022-09-10 PROCEDURE — 85610 PROTHROMBIN TIME: CPT

## 2022-09-10 PROCEDURE — 74011250637 HC RX REV CODE- 250/637: Performed by: NURSE PRACTITIONER

## 2022-09-10 PROCEDURE — 85025 COMPLETE CBC W/AUTO DIFF WBC: CPT

## 2022-09-10 PROCEDURE — 36415 COLL VENOUS BLD VENIPUNCTURE: CPT

## 2022-09-10 PROCEDURE — 90935 HEMODIALYSIS ONE EVALUATION: CPT

## 2022-09-10 PROCEDURE — 99238 HOSP IP/OBS DSCHRG MGMT 30/<: CPT | Performed by: FAMILY MEDICINE

## 2022-09-10 RX ORDER — WARFARIN 1 MG/1
1 TABLET ORAL EVERY EVENING
Status: DISCONTINUED | OUTPATIENT
Start: 2022-09-10 | End: 2022-09-10 | Stop reason: HOSPADM

## 2022-09-10 RX ORDER — WARFARIN 1 MG/1
1 TABLET ORAL ONCE
Qty: 30 TABLET | Refills: 0 | Status: SHIPPED | OUTPATIENT
Start: 2022-09-10 | End: 2022-09-10

## 2022-09-10 RX ADMIN — DOCUSATE SODIUM 50MG AND SENNOSIDES 8.6MG 1 TABLET: 8.6; 5 TABLET, FILM COATED ORAL at 10:46

## 2022-09-10 RX ADMIN — ERYTHROMYCIN 250 MG: 250 CAPSULE, DELAYED RELEASE PELLETS ORAL at 10:47

## 2022-09-10 RX ADMIN — FOLIC ACID 1 MG: 1 TABLET ORAL at 10:46

## 2022-09-10 RX ADMIN — ONDANSETRON 8 MG: 2 INJECTION INTRAMUSCULAR; INTRAVENOUS at 05:54

## 2022-09-10 RX ADMIN — POLYETHYLENE GLYCOL 3350 17 G: 17 POWDER, FOR SOLUTION ORAL at 10:48

## 2022-09-10 RX ADMIN — PANTOPRAZOLE SODIUM 40 MG: 40 TABLET, DELAYED RELEASE ORAL at 10:47

## 2022-09-10 RX ADMIN — BISACODYL 5 MG: 5 TABLET, DELAYED RELEASE ORAL at 10:47

## 2022-09-10 RX ADMIN — POTASSIUM CHLORIDE 20 MEQ: 750 TABLET, FILM COATED, EXTENDED RELEASE ORAL at 10:46

## 2022-09-10 RX ADMIN — ALLOPURINOL 100 MG: 100 TABLET ORAL at 10:47

## 2022-09-10 RX ADMIN — EPOETIN ALFA-EPBX 10000 UNITS: 10000 INJECTION, SOLUTION INTRAVENOUS; SUBCUTANEOUS at 08:12

## 2022-09-10 RX ADMIN — SUCRALFATE 1 G: 1 TABLET ORAL at 10:47

## 2022-09-10 RX ADMIN — ISOSORBIDE MONONITRATE 30 MG: 30 TABLET, EXTENDED RELEASE ORAL at 10:46

## 2022-09-10 RX ADMIN — CALCITRIOL CAPSULES 0.25 MCG 0.5 MCG: 0.25 CAPSULE ORAL at 10:46

## 2022-09-10 RX ADMIN — DICLOFENAC SODIUM 2 G: 10 GEL TOPICAL at 05:54

## 2022-09-10 RX ADMIN — CARVEDILOL 3.12 MG: 3.12 TABLET, FILM COATED ORAL at 10:47

## 2022-09-10 RX ADMIN — MIDODRINE HYDROCHLORIDE 2.5 MG: 5 TABLET ORAL at 10:47

## 2022-09-10 RX ADMIN — Medication 10 ML: at 06:15

## 2022-09-10 NOTE — DIALYSIS
Hemodialysis / [phone]    Vitals Pre Post Assessment Pre Post   BP BP: (!) 130/95 (09/10/22 0645)   129/62 LOC A&O x 3 A&O x 3   HR Pulse (Heart Rate): 91 (09/10/22 0645) 106 Lungs clear clear   Resp Resp Rate: 19 (09/10/22 0645) 20 Cardiac regular regular   Temp Temp: 98.3 °F (36.8 °C) (09/10/22 0545)  Skin warm warm   Weight Pre-Dialysis Weight: 124.9 kg (275 lb 5.7 oz) (09/10/22 0545)  Edema No edema No edema   Tele status remote remote Pain Pain Intensity 1: 0 (09/10/22 0351) 5/10 medicaided     Orders   Duration: Start: 0550 End: 0920 Total: 3.5 hr   Dialyzer: Dialyzer/Set Up Inspection: Max Ka ([id number]) (09/10/22 0545)   K Bath: Dialysate K (mEq/L): 2 (09/10/22 0545)   Ca Bath: Dialysate CA (mEq/L): 2.5 (09/10/22 0545)   Na: Dialysate NA (mEq/L): 140 (09/10/22 0545)   Bicarb: Dialysate HCO3 (mEq/L): 35 (09/10/22 0545)   Target Fluid Removal: Goal/Amount of Fluid to Remove (mL): 3000 mL (09/10/22 0545)     Access   Type & Location: LLA AVF   Comments:             patent, B&T positive, cannulated with 15 g needles x 2                           Labs   HBsAg (Antigen) / date:       09/07/22 Negative                                        HBsAb (Antibody) / date: 09/06/22 [facility]    Source: [facility]   Obtained/Reviewed  Critical Results Called HGB   Date Value Ref Range Status   09/10/2022 7.3 (L) 11.5 - 16.0 g/dL Final     Potassium   Date Value Ref Range Status   09/10/2022 3.3 (L) 3.5 - 5.1 mmol/L Final     Calcium   Date Value Ref Range Status   09/10/2022 9.0 8.5 - 10.1 MG/DL Final     BUN   Date Value Ref Range Status   09/10/2022 21 (H) 6 - 20 MG/DL Final     Creatinine   Date Value Ref Range Status   09/10/2022 7.65 (H) 0.55 - 1.02 MG/DL Final        Meds Given   Name Dose Route                    Adequacy / Fluid    Total Liters Process: 76 L   Net Fluid Removed: 2300 ml      Comments   Time Out Done:   (Time) Yes, 1441   Admitting Diagnosis: Renal failure   Consent obtained/signed: Informed Consent Verified: Yes (09/10/22 0545)   Machine / RO # Machine Number: [id number] (09/10/22 0545)   Primary Nurse Rpt Pre: Lenin Barber RN   Primary Nurse Rpt Post: Иван Arellano RN   Pt Education: Yes, r/t dialysis process   Care Plan: Continue HD as ordered   Pts outpatient clinic:      Tx Summary   Comments:        tolerated tx well, at end, all blood in circuit returned with 300 ml NS, LLA AVF patent, B&T positive, bleeding stopped at both sites, pressure dressing in place.

## 2022-09-10 NOTE — PROGRESS NOTES
Problem: Falls - Risk of  Goal: *Absence of Falls  Description: Document Osiris Fall Risk and appropriate interventions in the flowsheet.   Outcome: Progressing Towards Goal  Note: Fall Risk Interventions:  Mobility Interventions: Bed/chair exit alarm, Communicate number of staff needed for ambulation/transfer, Patient to call before getting OOB, Utilize walker, cane, or other assistive device         Medication Interventions: Bed/chair exit alarm, Evaluate medications/consider consulting pharmacy, Patient to call before getting OOB, Teach patient to arise slowly    Elimination Interventions: Bed/chair exit alarm, Call light in reach, Patient to call for help with toileting needs, Toileting schedule/hourly rounds    History of Falls Interventions: Bed/chair exit alarm         Problem: Patient Education: Go to Patient Education Activity  Goal: Patient/Family Education  Outcome: Progressing Towards Goal

## 2022-09-10 NOTE — PROGRESS NOTES
Problem: Falls - Risk of  Goal: *Absence of Falls  Description: Document Don Shipman Fall Risk and appropriate interventions in the flowsheet. 9/10/2022 1106 by Jordi Flowers RN  Outcome: Resolved/Met  Note: Fall Risk Interventions:  Mobility Interventions: OT consult for ADLs, PT Consult for mobility concerns, PT Consult for assist device competence         Medication Interventions: Patient to call before getting OOB, Teach patient to arise slowly    Elimination Interventions: Call light in reach    History of Falls Interventions: Bed/chair exit alarm      9/10/2022 1006 by Jordi Flowers RN  Outcome: Progressing Towards Goal  Note: Fall Risk Interventions:  Mobility Interventions: OT consult for ADLs, PT Consult for mobility concerns, PT Consult for assist device competence         Medication Interventions: Patient to call before getting OOB, Teach patient to arise slowly    Elimination Interventions: Call light in reach    History of Falls Interventions: Bed/chair exit alarm         Problem: Patient Education: Go to Patient Education Activity  Goal: Patient/Family Education  Outcome: Resolved/Met     Problem: Pressure Injury - Risk of  Goal: *Prevention of pressure injury  Description: Document Randy Scale and appropriate interventions in the flowsheet.   9/10/2022 1106 by Jordi Flowers RN  Outcome: Resolved/Met  Note: Pressure Injury Interventions:  Sensory Interventions: Assess changes in LOC, Check visual cues for pain    Moisture Interventions: Absorbent underpads, Maintain skin hydration (lotion/cream), Minimize layers    Activity Interventions: Increase time out of bed    Mobility Interventions: HOB 30 degrees or less, Pressure redistribution bed/mattress (bed type)    Nutrition Interventions: Document food/fluid/supplement intake    Friction and Shear Interventions: Apply protective barrier, creams and emollients, Lift sheet, Minimize layers             9/10/2022 1006 by Cuate Samuel LYNN RN  Outcome: Progressing Towards Goal  Note: Pressure Injury Interventions:  Sensory Interventions: Assess changes in LOC, Check visual cues for pain    Moisture Interventions: Absorbent underpads, Maintain skin hydration (lotion/cream), Minimize layers    Activity Interventions: Increase time out of bed    Mobility Interventions: HOB 30 degrees or less, Pressure redistribution bed/mattress (bed type)    Nutrition Interventions: Document food/fluid/supplement intake    Friction and Shear Interventions: Apply protective barrier, creams and emollients, Lift sheet, Minimize layers                Problem: Patient Education: Go to Patient Education Activity  Goal: Patient/Family Education  Outcome: Resolved/Met     Problem: Patient Education: Go to Patient Education Activity  Goal: Patient/Family Education  Outcome: Resolved/Met     Problem: Nutrition Deficit  Goal: *Optimize nutritional status  9/10/2022 1106 by Julita Hoffman RN  Outcome: Resolved/Met  9/10/2022 1006 by Julita Hoffman RN  Outcome: Progressing Towards Goal

## 2022-09-10 NOTE — PROGRESS NOTES
Bedside and Verbal shift change report given to Erika Galindo RN (oncoming nurse) by Angely Bedoya RN (offgoing nurse). Report included the following information SBAR, ED Summary, Intake/Output, MAR, and Recent Results.

## 2022-09-10 NOTE — PROGRESS NOTES
[address] [facility] [place] Sands  YOB: 1955          Assessment & Plan:     ESRD on PD at [facility]  N/V ?gastroparesis ?uremia/underdialysis ?other  Hypotension  Hypokalemia  Anemia    Rec:  Seen on HD vero well  ALDO with HD  OP HD TTS at [facility].  OK for d/c p HD today       Subjective:   CC: ESRD  HPI: Seen on HD vero well  Current Facility-Administered Medications   Medication Dose Route Frequency    potassium bicarb-citric acid (EFFER-K) tablet 20 mEq  20 mEq Oral NOW    carvediloL (COREG) tablet 3.125 mg  3.125 mg Oral BID WITH MEALS    isosorbide mononitrate ER (IMDUR) tablet 30 mg  30 mg Oral DAILY    diclofenac (VOLTAREN) 1 % topical gel 2 g  2 g Topical QID    0.9% sodium chloride infusion 250 mL  250 mL IntraVENous PRN    pantoprazole (PROTONIX) tablet 40 mg  40 mg Oral ACB&D    erythromycin (ERYC) capsule 250 mg  250 mg Oral TIDAC    epoetin charlie-epbx (RETACRIT) injection 10,000 Units  10,000 Units IntraVENous DIALYSIS TUE, THU & SAT    bisacodyL (DULCOLAX) tablet 5 mg  5 mg Oral Q12H    polyethylene glycol (MIRALAX) packet 17 g  17 g Oral Q12H    senna-docusate (PERICOLACE) 8.6-50 mg per tablet 1 Tablet  1 Tablet Oral Q12H    Warfarin Pharmacy to dose   Other Rx Dosing/Monitoring    ondansetron (ZOFRAN) injection 8 mg  8 mg IntraVENous Q6H    gentamicin (GARAMYCIN) 0.1 % ointment   Topical DAILY    gentamicin (GARAMYCIN) 0.1 % cream   Topical DIALYSIS PRN    nitroglycerin (NITROSTAT) tablet 0.4 mg  0.4 mg SubLINGual Q5MIN PRN    sodium chloride (NS) flush 5-40 mL  5-40 mL IntraVENous Q8H    sodium chloride (NS) flush 5-40 mL  5-40 mL IntraVENous PRN    acetaminophen (TYLENOL) tablet 650 mg  650 mg Oral Q6H PRN    Or    acetaminophen (TYLENOL) suppository 650 mg  650 mg Rectal Q6H PRN    albuterol-ipratropium (DUO-NEB) 2.5 MG-0.5 MG/3 ML  3 mL Nebulization Q6H PRN    allopurinoL (ZYLOPRIM) tablet 100 mg  100 mg Oral DAILY    atorvastatin (LIPITOR) tablet 80 mg  80 mg Oral QHS    calcitRIOL (ROCALTROL) capsule 0.5 mcg  0.5 mcg Oral DAILY    folic acid (FOLVITE) tablet 1 mg  1 mg Oral DAILY    midodrine (PROAMATINE) tablet 2.5 mg  2.5 mg Oral BID    potassium chloride SR (KLOR-CON 10) tablet 20 mEq  20 mEq Oral DAILY    sucralfate (CARAFATE) tablet 1 g  1 g Oral AC&HS          Objective:     Vitals:  Blood pressure (!) 112/58, pulse (!) 114, temperature 98.3 °F (36.8 °C), temperature source Oral, resp. rate 19, height 5' 10\" (1.778 m), weight 125.6 kg (277 lb), SpO2 98 %. Temp (24hrs), Av.2 °F (36.8 °C), Min:98 °F (36.7 °C), Max:98.4 °F (36.9 °C)      Intake and Output:  No intake/output data recorded.  1901 - 09/10 0700  In: 440 [P.O.:440]  Out: -     Physical Exam:               GENERAL ASSESSMENT: NADEXTREMITY: +EDEMA; L FA AVF  NEURO: Grossly non focal          ECG/rhythm:    Data Review      No results for input(s): TNIPOC in the last 72 hours. No lab exists for component: ITNL   Recent Labs     22  0950   CPK 28       Recent Labs     09/10/22  0445 22  0552 22  1412 22  0331    138 138 136   K 3.3* 3.8 3.9 3.5    103 102 103   CO2 25 27 31 25   BUN 21* 18 15 33*   CREA 7.65* 6.27* 5.29* 8.74*   GLU 87 81 113* 91   PHOS  --   --   --  2.6   MG  --   --  1.6 1.6   CA 9.0 9.0 9.4 9.2  9.3   ALB 2.2* 2.0*  --  2.3*   WBC 8.2 8.8  --  11.8*   HGB 7.3* 7.6*  --  8.2*   HCT 23.7* 24.1*  --  25.6*    216  --  237        Recent Labs     09/10/22  0445 22  0552 22  0331   INR 2.6* 3.2* 3.8*   PTP 25.6* 30.8* 36.7*       Needs: urine analysis, urine sodium, protein and creatinine  Lab Results   Component Value Date/Time    Sodium,urine random 25 11/10/2014 12:40 PM    Creatinine, urine 145.29 2017 05:01 AM           : Nora Raphael MD  9/10/2022        [facility]:  [email or web address]  Hiro Jacome office:  [address]  [place], [address] [facility]  Phone: [phone]  Fax :     [phone]    Nani office:  [address] [facility]  Naty Cardoza  Phone - [phone]  Fax - [phone]

## 2022-09-10 NOTE — PROGRESS NOTES
Problem: Falls - Risk of  Goal: *Absence of Falls  Description: Document Loren Bare Fall Risk and appropriate interventions in the flowsheet. Outcome: Progressing Towards Goal  Note: Fall Risk Interventions:  Mobility Interventions: OT consult for ADLs, PT Consult for mobility concerns, PT Consult for assist device competence         Medication Interventions: Patient to call before getting OOB, Teach patient to arise slowly    Elimination Interventions: Call light in reach    History of Falls Interventions: Bed/chair exit alarm         Problem: Pressure Injury - Risk of  Goal: *Prevention of pressure injury  Description: Document Randy Scale and appropriate interventions in the flowsheet.   Outcome: Progressing Towards Goal  Note: Pressure Injury Interventions:  Sensory Interventions: Assess changes in LOC, Check visual cues for pain    Moisture Interventions: Absorbent underpads, Maintain skin hydration (lotion/cream), Minimize layers    Activity Interventions: Increase time out of bed    Mobility Interventions: HOB 30 degrees or less, Pressure redistribution bed/mattress (bed type)    Nutrition Interventions: Document food/fluid/supplement intake    Friction and Shear Interventions: Apply protective barrier, creams and emollients, Lift sheet, Minimize layers                Problem: Nutrition Deficit  Goal: *Optimize nutritional status  Outcome: Progressing Towards Goal

## 2022-09-10 NOTE — PROGRESS NOTES
[facility] Dosing Services: 9/10/22    Consult for Warfarin Dosing by Pharmacy by Dr. Keli Lucia provided for this 61 y.o.  female , for indication of History of Venous Thrombosis (confirmed no current VTE). Day of Therapy Resumed from PTA medication list (1 mg on MWF, 2.5 mg rest of week)  Dose to achieve an INR goal of 2-3  INR 2.6 today which is down from 3.2, but in therapeutic range  Order entered for Warfarin  1 (mg) ordered to be given today at 18:00. Discussed reducing dose on Medical Resident rounds to 1 mg PO daily while on both allopurinol and erythromycin. Recommended patient follow up outpatient on Monday if discharged today. Significant drug interactions: allopurinol (home med), erythromycin   Previous dose given 1 mg on 9/9  HELD on 9/8  HELD on 9/7  2.5mg on 9/6  1 mg on 9/5  2.5 mg on 9/4  2.5 mg on 9/3   PT/INR Lab Results   Component Value Date/Time    INR 2.6 (H) 09/10/2022 04:45 AM      Platelets Lab Results   Component Value Date/Time    PLATELET 769 45/48/1802 04:45 AM      H/H Lab Results   Component Value Date/Time    HGB 7.3 (L) 09/10/2022 04:45 AM        Pharmacy to follow daily and will provide subsequent Warfarin dosing based on clinical status.   JOHNSON Aguayo)  Contact information [phone]

## 2022-09-12 ENCOUNTER — TELEPHONE (OUTPATIENT)
Dept: FAMILY MEDICINE CLINIC | Age: 67
End: 2022-09-12

## 2022-09-12 NOTE — TELEPHONE ENCOUNTER
Patient has swollen legs and hands and is insisting to be seen immediately. She has an in office appointment [phone] but is insisting on being seen immediately.  Patient's phone: [phone]

## 2022-09-12 NOTE — TELEPHONE ENCOUNTER
Received call from [address] [place] [address] with [facility]. States that pt's INR is 1.8, she is currently in 1mg QD. mentioned that pt's INR went as high as 13 while she was in [facility] also states that pt has an appt with her cardiologist today and feels that she may be readmitted d/t ongoing pain and difficulty moving. [address] [place] [address] can be reached at [phone]. Sending to Dr. Ifeoma Heath and another provider that pt has seen that is in office.

## 2022-09-12 NOTE — TELEPHONE ENCOUNTER
I called Tika Brice at [facility] as PCP Dr. Toya Durand is not in office today. no answer so left VM. I called and spoke with patient. I do not recommend changing warfarin dose, she is taking 1mg since discharge 9/10. INR was very fluctuant during admission. Continue 1mg daily. She is going to see cardiologist today for variable heart rate, fluid buildup. Reports she cannot get hemodialysis due to variable HR and has been retaining fluid since discharge. Cardiology appt is in 1 hour. Patient thinks she is going to be sent back to the hospital from cardiologist.   She is also requesting sooner appt with PCP for thigh and back pain, she has appt 9/16/2022. Recommend she go to cardiology appt as scheduled and see their recommendations. Can call back after to speak with scheduling to see if sooner PCP appt is available.

## 2022-09-13 ENCOUNTER — DOCUMENTATION ONLY (OUTPATIENT)
Dept: FAMILY MEDICINE CLINIC | Age: 67
End: 2022-09-13

## 2022-09-14 DIAGNOSIS — I26.99 OTHER PULMONARY EMBOLISM WITHOUT ACUTE COR PULMONALE, UNSPECIFIED CHRONICITY (HCC): ICD-10-CM

## 2022-09-14 LAB
VIT B1 BLD-SCNC: 102.7 NMOL/L (ref 66.5–200)
VIT B6 SERPL-MCNC: NORMAL UG/L

## 2022-09-14 RX ORDER — WARFARIN 2.5 MG/1
TABLET ORAL
Qty: 45 TABLET | Refills: 0 | Status: SHIPPED | OUTPATIENT
Start: 2022-09-14 | End: 2022-09-16 | Stop reason: SDUPTHER

## 2022-09-15 ENCOUNTER — DOCUMENTATION ONLY (OUTPATIENT)
Dept: FAMILY MEDICINE CLINIC | Age: 67
End: 2022-09-15

## 2022-09-15 RX ORDER — POTASSIUM CHLORIDE 20 MEQ/1
20 TABLET, EXTENDED RELEASE ORAL DAILY
Qty: 30 TABLET | Refills: 2 | Status: SHIPPED | OUTPATIENT
Start: 2022-09-15

## 2022-09-15 RX ORDER — FOLIC ACID 1 MG/1
1 TABLET ORAL DAILY
Qty: 30 TABLET | Refills: 2 | Status: SHIPPED | OUTPATIENT
Start: 2022-09-15

## 2022-09-16 ENCOUNTER — OFFICE VISIT (OUTPATIENT)
Dept: FAMILY MEDICINE CLINIC | Age: 67
End: 2022-09-16
Payer: MEDICARE

## 2022-09-16 ENCOUNTER — DOCUMENTATION ONLY (OUTPATIENT)
Dept: FAMILY MEDICINE CLINIC | Age: 67
End: 2022-09-16

## 2022-09-16 VITALS
HEIGHT: 70 IN | WEIGHT: 266.76 LBS | SYSTOLIC BLOOD PRESSURE: 109 MMHG | TEMPERATURE: 98.6 F | DIASTOLIC BLOOD PRESSURE: 73 MMHG | RESPIRATION RATE: 16 BRPM | OXYGEN SATURATION: 99 % | HEART RATE: 101 BPM | BODY MASS INDEX: 38.19 KG/M2

## 2022-09-16 DIAGNOSIS — E87.6 HYPOKALEMIA: ICD-10-CM

## 2022-09-16 DIAGNOSIS — Z79.01 WARFARIN ANTICOAGULATION: ICD-10-CM

## 2022-09-16 DIAGNOSIS — I26.99 OTHER PULMONARY EMBOLISM WITHOUT ACUTE COR PULMONALE, UNSPECIFIED CHRONICITY (HCC): Primary | ICD-10-CM

## 2022-09-16 DIAGNOSIS — D64.9 ANEMIA, UNSPECIFIED TYPE: ICD-10-CM

## 2022-09-16 DIAGNOSIS — M79.652 PAIN IN BOTH THIGHS: ICD-10-CM

## 2022-09-16 DIAGNOSIS — Z09 HOSPITAL DISCHARGE FOLLOW-UP: ICD-10-CM

## 2022-09-16 DIAGNOSIS — M79.651 PAIN IN BOTH THIGHS: ICD-10-CM

## 2022-09-16 DIAGNOSIS — K31.84 GASTROPARESIS: ICD-10-CM

## 2022-09-16 LAB
INR BLD: 1.4
PT POC: 17.2 SECONDS
VALID INTERNAL CONTROL?: YES

## 2022-09-16 PROCEDURE — 99495 TRANSJ CARE MGMT MOD F2F 14D: CPT | Performed by: FAMILY MEDICINE

## 2022-09-16 PROCEDURE — 1111F DSCHRG MED/CURRENT MED MERGE: CPT | Performed by: FAMILY MEDICINE

## 2022-09-16 PROCEDURE — 85610 PROTHROMBIN TIME: CPT | Performed by: FAMILY MEDICINE

## 2022-09-16 PROCEDURE — G8427 DOCREV CUR MEDS BY ELIG CLIN: HCPCS | Performed by: FAMILY MEDICINE

## 2022-09-16 RX ORDER — WARFARIN 2.5 MG/1
TABLET ORAL
Qty: 45 TABLET | Refills: 1 | Status: SHIPPED | OUTPATIENT
Start: 2022-09-16

## 2022-09-16 NOTE — PROGRESS NOTES
Progress Note    she is a 61y.o. year old female who presents for evalution. Subjective:     Pt here for f/up form her recent hospital stay for intractable nausea, she is feeling much better overall. Seems to be from gastroparesis and is on erythromycin now for this . She has been having sig anterior thigh pain. Saw PT and they think due to being laid up she has lost muscle  mass, she is doing better and has been   HGB was 7.3 on discharge, given order for recheck. K was a little low at 3.3 on d/c. Taking 20meq daily K. INR is 1.4 today and while in hospital was all over the place then went very high and she was given vitamin K. Will revert to prior dosing. The erythromycin may elevate her levels as well. Reviewed PmHx, RxHx, FmHx, SocHx, AllgHx and updated and dated in the chart. Review of Systems - negative except as listed above in the HPI    Objective:     Vitals:    09/16/22 1200   BP: 109/73   Pulse: (!) 101   Resp: 16   Temp: 98.6 °F (37 °C)   TempSrc: Oral   SpO2: 99%   Weight: 266 lb 12.1 oz (121 kg)   Height: 5' 10\" (1.778 m)       Current Outpatient Medications   Medication Sig    warfarin (COUMADIN) 2.5 mg tablet Take 1 mg by mouth every Monday, Wednesday, Friday. Alternate with 2.5mg on tues, thurs, sat.    potassium chloride (K-DUR, KLOR-CON M20) 20 mEq tablet Take 1 Tablet by mouth daily. folic acid (FOLVITE) 1 mg tablet Take 1 Tablet by mouth daily. carvediloL (COREG) 3.125 mg tablet Take 1 Tablet by mouth two (2) times daily (with meals). isosorbide mononitrate ER (IMDUR) 30 mg tablet Take 1 Tablet by mouth daily. pantoprazole (PROTONIX) 40 mg tablet Take 1 Tablet by mouth Before breakfast and dinner. Indications: gastritis    erythromycin (ERYC) 250 mg capsule Take 1 Capsule by mouth Before breakfast, lunch, and dinner.  Indications: stomach muscle paralysis and decreased function    polyethylene glycol (MIRALAX) 17 gram packet Take 1 Packet by mouth daily as needed for Constipation. midodrine (PROAMATINE) 2.5 mg tablet Take 1 Tablet by mouth two (2) times a day for 30 days. nitroglycerin (NITROSTAT) 0.4 mg SL tablet 1 Tablet by SubLINGual route every five (5) minutes as needed for Chest Pain. Up to 3 doses. triamcinolone acetonide (KENALOG) 0.1 % ointment Apply  to affected area two (2) times a day. use thin layer    ondansetron (ZOFRAN ODT) 4 mg disintegrating tablet Take 1 Tablet by mouth every eight (8) hours as needed for Nausea or Vomiting. atorvastatin (LIPITOR) 80 mg tablet TAKE 1 TABLET BY MOUTH ONCE DAILY NIGHTLY    allopurinoL (ZYLOPRIM) 100 mg tablet Take 1 tablet by mouth once daily    CALCITRIOL PO Take 0.5 mg by mouth.    sucralfate (CARAFATE) 1 gram tablet Take 1 tablet by mouth 4 times daily    albuterol (PROAIR HFA) 90 mcg/actuation inhaler Take 2 Puffs by inhalation every four (4) hours as needed for Wheezing. Oxygen O2 2L NC 24/7     No current facility-administered medications for this visit. Physical Examination: General appearance - alert, well appearing, and in no distress  Musculoskeletal - ttp over b/l anterior thighs. Assessment/ Plan:   Diagnoses and all orders for this visit:    1. Other pulmonary embolism without acute cor pulmonale, unspecified chronicity (HCC)  -     warfarin (COUMADIN) 2.5 mg tablet; Take 1 mg by mouth every Monday, Wednesday, Friday. Alternate with 2.5mg on tues, thurs, sat. Back on usual dosing follow-up 2 to 3 weeks for recheck. 2. Warfarin anticoagulation  -     AMB POC PT/INR    3. Anemia, unspecified type  -     CBC W/O DIFF; Future    4. Hypokalemia  -     METABOLIC PANEL, BASIC; Future    5. Hospital discharge follow-up  -     [place] DISCHARGE MEDS RECONCILED W/ CURRENT OUTPATIENT MED LIST    6. Gastroparesis   Better on erythromycin    7.  Thigh pain  Gentle stretching heating pad, CK was normal checked in hospital  Follow-up and Dispositions    Return in about 3 weeks (around 10/7/2022), or if symptoms worsen or fail to improve. I have discussed the diagnosis with the patient and the intended plan as seen in the above orders. The patient has received an after-visit summary and questions were answered concerning future plans. Pt conveyed understanding of plan.     Medication Side Effects and Warnings were discussed with patient    An electronic signature was used to authenticate this note  Orin Duron DO

## 2022-09-16 NOTE — PROGRESS NOTES
Chief Complaint   Patient presents with    Hospital Follow Up     Patient presents in office today for SEE follow up from [facility]. Admitted on 9/1/22 for intractable nausea. Discharged on 9/10/22. Also here for INR check. Has c/o pain in her thighs  No other concerns. 1. Have you been to the ER, urgent care clinic since your last visit? Hospitalized since your last visit? Yes When: 9/1/22 Where: [facility] Reason for visit: intractable nausea    2. Have you seen or consulted any other health care providers outside of the [address] [place] [address] since your last visit? Include any pap smears or colon screening.  No    Learning Assessment 6/28/2019   PRIMARY LEARNER Patient   PRIMARY LANGUAGE ENGLISH   LEARNER PREFERENCE PRIMARY LISTENING   ANSWERED BY patient    RELATIONSHIP SELF

## 2022-09-16 NOTE — PATIENT INSTRUCTIONS
A Healthy Lifestyle: Care Instructions  Your Care Instructions     A healthy lifestyle can help you feel good, stay at a healthy weight, and have plenty of energy for both work and play. A healthy lifestyle is something you can share with your whole family. A healthy lifestyle also can lower your risk for serious health problems, such as high blood pressure, heart disease, and diabetes. You can follow a few steps listed below to improve your health and the health of your family. Follow-up care is a key part of your treatment and safety. Be sure to make and go to all appointments, and call your doctor if you are having problems. It's also a good idea to know your test results and keep a list of the medicines you take. How can you care for yourself at home? Do not eat too much sugar, fat, or fast foods. You can still have dessert and treats now and then. The goal is moderation. Start small to improve your eating habits. Pay attention to portion sizes, drink less juice and soda pop, and eat more fruits and vegetables. Eat a healthy amount of food. A 3-ounce serving of meat, for example, is about the size of a deck of cards. Fill the rest of your plate with vegetables and whole grains. Limit the amount of soda and sports drinks you have every day. Drink more water when you are thirsty. Eat plenty of fruits and vegetables every day. Have an apple or some carrot sticks as an afternoon snack instead of a candy bar. Try to have fruits and/or vegetables at every meal.  Make exercise part of your daily routine. You may want to start with simple activities, such as walking, bicycling, or slow swimming. Try to be active 30 to 60 minutes every day. You do not need to do all 30 to 60 minutes all at once. For example, you can exercise 3 times a day for 10 or 20 minutes. Moderate exercise is safe for most people, but it is always a good idea to talk to your doctor before starting an exercise program.  Keep moving.  Mow the lawn, work in the garden, or [employer]. Take the stairs instead of the elevator at work. If you smoke, quit. People who smoke have an increased risk for heart attack, stroke, cancer, and other lung illnesses. Quitting is hard, but there are ways to boost your chance of quitting tobacco for good. Use nicotine gum, patches, or lozenges. Ask your doctor about stop-smoking programs and medicines. Keep trying. In addition to reducing your risk of diseases in the future, you will notice some benefits soon after you stop using tobacco. If you have shortness of breath or asthma symptoms, they will likely get better within a few weeks after you quit. Limit how much alcohol you drink. Moderate amounts of alcohol (up to 2 drinks a day for men, 1 drink a day for women) are okay. But drinking too much can lead to liver problems, high blood pressure, and other health problems. Family health  If you have a family, there are many things you can do together to improve your health. Eat meals together as a family as often as possible. Eat healthy foods. This includes fruits, vegetables, lean meats and dairy, and whole grains. Include your family in your fitness plan. Most people think of activities such as jogging or tennis as the way to fitness, but there are many ways you and your family can be more active. Anything that makes you breathe hard and gets your heart pumping is exercise. Here are some tips:  Walk to do errands or to take your child to school or the bus. Go for a family bike ride after dinner instead of watching TV. Where can you learn more? Go to http://www.gray.com/  Enter Y205 in the search box to learn more about \"A Healthy Lifestyle: Care Instructions. \"  Current as of: June 16, 2021               Content Version: 13.2  © 0978-5418 [employer], [employer]orporated.    Care instructions adapted under license by [employer] (which disclaims liability or warranty for this information). If you have questions about a medical condition or this instruction, always ask your healthcare professional. [place] [address] any warranty or liability for your use of this information.

## 2022-09-19 ENCOUNTER — DOCUMENTATION ONLY (OUTPATIENT)
Dept: FAMILY MEDICINE CLINIC | Age: 67
End: 2022-09-19

## 2022-09-23 ENCOUNTER — TELEPHONE (OUTPATIENT)
Dept: FAMILY MEDICINE CLINIC | Age: 67
End: 2022-09-23

## 2022-09-23 NOTE — TELEPHONE ENCOUNTER
Ophelia Da Silva with [facility] called in and is asking for a verbal to d/c therapy. Pt is not keeping appts and states she doesn't have time for them. Call back number to give verbal is [phone]. Thanks.

## 2022-09-28 ENCOUNTER — TELEPHONE (OUTPATIENT)
Dept: FAMILY MEDICINE CLINIC | Age: 67
End: 2022-09-28

## 2022-09-28 NOTE — TELEPHONE ENCOUNTER
Her INR is normal.  If the only reason she is coming in then we can cancel the appointment and schedule her a few weeks out for an INR check.

## 2022-09-28 NOTE — TELEPHONE ENCOUNTER
Received call transferred from [facility] with Corina Tello from [facility] on the line reporting that patient's INR was 2.0 and PT was 24.2. Currently taking 1 MG of coumadin every M,W F and 2.5 on T,TH, Sat.  She has an apt for Friday 9/30/22 for INR check

## 2022-09-28 NOTE — TELEPHONE ENCOUNTER
Called and spoke with patient. Advised that her INR is normal and is that is all the apt on Friday is for then we can move it out a couple of weeks. Patient verbalized understanding.  Apt moved to 10/21/22

## 2022-10-05 DIAGNOSIS — E86.1 HYPOTENSION DUE TO HYPOVOLEMIA: ICD-10-CM

## 2022-10-05 DIAGNOSIS — I95.89 HYPOTENSION DUE TO HYPOVOLEMIA: ICD-10-CM

## 2022-10-06 RX ORDER — MIDODRINE HYDROCHLORIDE 2.5 MG/1
TABLET ORAL
Qty: 180 TABLET | Refills: 3 | Status: SHIPPED | OUTPATIENT
Start: 2022-10-06

## 2022-10-14 ENCOUNTER — TELEPHONE (OUTPATIENT)
Dept: FAMILY MEDICINE CLINIC | Age: 67
End: 2022-10-14

## 2022-10-14 NOTE — TELEPHONE ENCOUNTER
Pt called in and states she has lots of drainage going down into her throat & very think flem. States it is also making her very nauseous as well. Wondering if you could call in something for her please. Call back number for her is [phone]. Thanks.

## 2022-10-14 NOTE — TELEPHONE ENCOUNTER
Called and spoke with patient. Advised to get either cetirizine or levocetirizine OTC as they are not covered by insurance. Patient verbalized understanding.

## 2022-10-19 ENCOUNTER — TELEPHONE ANTICOAG (OUTPATIENT)
Dept: FAMILY MEDICINE CLINIC | Age: 67
End: 2022-10-19

## 2022-10-21 ENCOUNTER — OFFICE VISIT (OUTPATIENT)
Dept: FAMILY MEDICINE CLINIC | Age: 67
End: 2022-10-21
Payer: MEDICARE

## 2022-10-21 VITALS
HEIGHT: 70 IN | WEIGHT: 268 LBS | HEART RATE: 94 BPM | RESPIRATION RATE: 16 BRPM | TEMPERATURE: 98.4 F | DIASTOLIC BLOOD PRESSURE: 71 MMHG | BODY MASS INDEX: 38.37 KG/M2 | OXYGEN SATURATION: 98 % | SYSTOLIC BLOOD PRESSURE: 120 MMHG

## 2022-10-21 DIAGNOSIS — J06.9 ACUTE URI: Primary | ICD-10-CM

## 2022-10-21 PROCEDURE — G8417 CALC BMI ABV UP PARAM F/U: HCPCS | Performed by: FAMILY MEDICINE

## 2022-10-21 PROCEDURE — G9231 DOC ESRD DIA TRANS PREG: HCPCS | Performed by: FAMILY MEDICINE

## 2022-10-21 PROCEDURE — 3017F COLORECTAL CA SCREEN DOC REV: CPT | Performed by: FAMILY MEDICINE

## 2022-10-21 PROCEDURE — G8510 SCR DEP NEG, NO PLAN REQD: HCPCS | Performed by: FAMILY MEDICINE

## 2022-10-21 PROCEDURE — G8427 DOCREV CUR MEDS BY ELIG CLIN: HCPCS | Performed by: FAMILY MEDICINE

## 2022-10-21 PROCEDURE — 99214 OFFICE O/P EST MOD 30 MIN: CPT | Performed by: FAMILY MEDICINE

## 2022-10-21 PROCEDURE — G0463 HOSPITAL OUTPT CLINIC VISIT: HCPCS | Performed by: FAMILY MEDICINE

## 2022-10-21 RX ORDER — AZITHROMYCIN 250 MG/1
TABLET, FILM COATED ORAL
Qty: 6 TABLET | Refills: 0 | Status: SHIPPED | OUTPATIENT
Start: 2022-10-21

## 2022-10-21 NOTE — PROGRESS NOTES
Chief Complaint   Patient presents with    Cough    Chest Congestion     Patient presents in office today with c/o cough and congestion for 3 days. States that she is coughing up green mucus. Also has c/o body aches. Took a home COVID test that was neg. No other concerns. 1. Have you been to the ER, urgent care clinic since your last visit? Hospitalized since your last visit? No    2. Have you seen or consulted any other health care providers outside of the [address] [place] [address] since your last visit? Include any pap smears or colon screening.  No    Learning Assessment 6/28/2019   PRIMARY LEARNER Patient   PRIMARY LANGUAGE ENGLISH   LEARNER PREFERENCE PRIMARY LISTENING   ANSWERED BY patient    RELATIONSHIP SELF

## 2022-10-21 NOTE — PATIENT INSTRUCTIONS

## 2022-10-21 NOTE — PROGRESS NOTES
Progress Note    she is a 61y.o. year old female who presents for evalution. Subjective:     Patient here with cough and congestion for the past 3 days. Cough is productive with green mucus. She had called in yesterday nurse advised her to take home COVID test prior to her visit which she did and this was negative. She is having body aches and chills but no fever. No shortness of breath worse than usual.  Using Mucinex to help with congestion. Using nebulizer. Reviewed PmHx, RxHx, FmHx, SocHx, AllgHx and updated and dated in the chart. Review of Systems - negative except as listed above in the HPI    Objective:     Vitals:    10/21/22 1054   BP: 120/71   Pulse: 94   Resp: 16   Temp: 98.4 °F (36.9 °C)   TempSrc: Oral   SpO2: 98%   Weight: 268 lb (121.6 kg)   Height: 5' 10\" (1.778 m)       Current Outpatient Medications   Medication Sig    azithromycin (ZITHROMAX) 250 mg tablet Take 2 tablets today, then take 1 tablet daily    midodrine (PROAMATINE) 2.5 mg tablet Take 1 tablet by mouth twice daily    warfarin (COUMADIN) 2.5 mg tablet Take 1 mg by mouth every Monday, Wednesday, Friday. Alternate with 2.5mg on tues, thurs, sat.    potassium chloride (K-DUR, KLOR-CON M20) 20 mEq tablet Take 1 Tablet by mouth daily. folic acid (FOLVITE) 1 mg tablet Take 1 Tablet by mouth daily. carvediloL (COREG) 3.125 mg tablet Take 1 Tablet by mouth two (2) times daily (with meals). isosorbide mononitrate ER (IMDUR) 30 mg tablet Take 1 Tablet by mouth daily. pantoprazole (PROTONIX) 40 mg tablet Take 1 Tablet by mouth Before breakfast and dinner. Indications: gastritis    erythromycin (ERYC) 250 mg capsule Take 1 Capsule by mouth Before breakfast, lunch, and dinner. Indications: stomach muscle paralysis and decreased function    polyethylene glycol (MIRALAX) 17 gram packet Take 1 Packet by mouth daily as needed for Constipation.     nitroglycerin (NITROSTAT) 0.4 mg SL tablet 1 Tablet by SubLINGual route every five (5) minutes as needed for Chest Pain. Up to 3 doses. triamcinolone acetonide (KENALOG) 0.1 % ointment Apply  to affected area two (2) times a day. use thin layer    ondansetron (ZOFRAN ODT) 4 mg disintegrating tablet Take 1 Tablet by mouth every eight (8) hours as needed for Nausea or Vomiting. atorvastatin (LIPITOR) 80 mg tablet TAKE 1 TABLET BY MOUTH ONCE DAILY NIGHTLY    allopurinoL (ZYLOPRIM) 100 mg tablet Take 1 tablet by mouth once daily    CALCITRIOL PO Take 0.5 mg by mouth.    sucralfate (CARAFATE) 1 gram tablet Take 1 tablet by mouth 4 times daily    albuterol (PROAIR HFA) 90 mcg/actuation inhaler Take 2 Puffs by inhalation every four (4) hours as needed for Wheezing. Oxygen O2 2L NC 24/7     No current facility-administered medications for this visit. Physical Examination: General appearance - alert, well appearing, and in no distress  Chest -congestion and lower lobe regions. Heart - normal rate, regular rhythm, normal S1, S2, no murmurs, rubs, clicks or gallops      Assessment/ Plan:   Diagnoses and all orders for this visit:    1. Acute URI  -     azithromycin (ZITHROMAX) 250 mg tablet; Take 2 tablets today, then take 1 tablet daily  Continue with Mucinex  Follow-up and Dispositions    Return if symptoms worsen or fail to improve. I have discussed the diagnosis with the patient and the intended plan as seen in the above orders. The patient has received an after-visit summary and questions were answered concerning future plans. Pt conveyed understanding of plan.     Medication Side Effects and Warnings were discussed with patient    An electronic signature was used to authenticate this note  Naomi Kumari DO

## 2022-10-24 ENCOUNTER — TELEPHONE (OUTPATIENT)
Dept: FAMILY MEDICINE CLINIC | Age: 67
End: 2022-10-24

## 2022-10-25 NOTE — TELEPHONE ENCOUNTER
Called and spoke with patient. Advised to hold coumadin for 2 extra days and then resume. Patient verbalized understanding.

## 2022-10-26 ENCOUNTER — TRANSCRIBE ORDER (OUTPATIENT)
Dept: SCHEDULING | Age: 67
End: 2022-10-26

## 2022-10-26 DIAGNOSIS — J84.115 RESPIRATORY BRONCHIOLITIS ASSOCIATED INTERSTITIAL LUNG DISEASE (HCC): Primary | ICD-10-CM

## 2022-11-15 ENCOUNTER — OFFICE VISIT (OUTPATIENT)
Dept: FAMILY MEDICINE CLINIC | Age: 67
End: 2022-11-15
Payer: MEDICARE

## 2022-11-15 VITALS
RESPIRATION RATE: 16 BRPM | SYSTOLIC BLOOD PRESSURE: 90 MMHG | WEIGHT: 268 LBS | HEIGHT: 70 IN | HEART RATE: 78 BPM | TEMPERATURE: 98 F | BODY MASS INDEX: 38.37 KG/M2 | DIASTOLIC BLOOD PRESSURE: 55 MMHG | OXYGEN SATURATION: 99 %

## 2022-11-15 DIAGNOSIS — I26.99 OTHER PULMONARY EMBOLISM WITHOUT ACUTE COR PULMONALE, UNSPECIFIED CHRONICITY (HCC): ICD-10-CM

## 2022-11-15 DIAGNOSIS — Z79.01 ENCOUNTER FOR MONITORING COUMADIN THERAPY: Primary | ICD-10-CM

## 2022-11-15 DIAGNOSIS — Z13.31 POSITIVE DEPRESSION SCREENING: ICD-10-CM

## 2022-11-15 DIAGNOSIS — G89.29 CHRONIC PAIN OF RIGHT KNEE: ICD-10-CM

## 2022-11-15 DIAGNOSIS — G89.29 CHRONIC BILATERAL LOW BACK PAIN, UNSPECIFIED WHETHER SCIATICA PRESENT: ICD-10-CM

## 2022-11-15 DIAGNOSIS — M54.50 CHRONIC BILATERAL LOW BACK PAIN, UNSPECIFIED WHETHER SCIATICA PRESENT: ICD-10-CM

## 2022-11-15 DIAGNOSIS — Z51.81 ENCOUNTER FOR MONITORING COUMADIN THERAPY: Primary | ICD-10-CM

## 2022-11-15 DIAGNOSIS — L29.9 PRURITUS: ICD-10-CM

## 2022-11-15 DIAGNOSIS — R07.9 CHRONIC CHEST PAIN: ICD-10-CM

## 2022-11-15 DIAGNOSIS — M25.561 CHRONIC PAIN OF RIGHT KNEE: ICD-10-CM

## 2022-11-15 DIAGNOSIS — G89.29 CHRONIC CHEST PAIN: ICD-10-CM

## 2022-11-15 LAB
INR BLD: 1.6
PT POC: 19.2 SECONDS
VALID INTERNAL CONTROL?: YES

## 2022-11-15 PROCEDURE — 85610 PROTHROMBIN TIME: CPT | Performed by: FAMILY MEDICINE

## 2022-11-15 PROCEDURE — G8431 POS CLIN DEPRES SCRN F/U DOC: HCPCS | Performed by: FAMILY MEDICINE

## 2022-11-15 PROCEDURE — 3017F COLORECTAL CA SCREEN DOC REV: CPT | Performed by: FAMILY MEDICINE

## 2022-11-15 PROCEDURE — G0463 HOSPITAL OUTPT CLINIC VISIT: HCPCS | Performed by: FAMILY MEDICINE

## 2022-11-15 PROCEDURE — 3074F SYST BP LT 130 MM HG: CPT | Performed by: FAMILY MEDICINE

## 2022-11-15 PROCEDURE — 3078F DIAST BP <80 MM HG: CPT | Performed by: FAMILY MEDICINE

## 2022-11-15 PROCEDURE — G8427 DOCREV CUR MEDS BY ELIG CLIN: HCPCS | Performed by: FAMILY MEDICINE

## 2022-11-15 PROCEDURE — 99214 OFFICE O/P EST MOD 30 MIN: CPT | Performed by: FAMILY MEDICINE

## 2022-11-15 PROCEDURE — G8417 CALC BMI ABV UP PARAM F/U: HCPCS | Performed by: FAMILY MEDICINE

## 2022-11-15 PROCEDURE — G9231 DOC ESRD DIA TRANS PREG: HCPCS | Performed by: FAMILY MEDICINE

## 2022-11-15 RX ORDER — OXYCODONE AND ACETAMINOPHEN 10; 325 MG/1; MG/1
1 TABLET ORAL
Qty: 90 TABLET | Refills: 0 | Status: SHIPPED | OUTPATIENT
Start: 2022-11-29 | End: 2022-12-29

## 2022-11-15 RX ORDER — FOLIC ACID 1 MG/1
1 TABLET ORAL DAILY
Qty: 90 TABLET | Refills: 3 | Status: SHIPPED | OUTPATIENT
Start: 2022-11-15

## 2022-11-15 RX ORDER — SUCRALFATE 1 G/1
TABLET ORAL
Qty: 120 TABLET | Refills: 5 | Status: SHIPPED | OUTPATIENT
Start: 2022-11-15

## 2022-11-15 RX ORDER — WARFARIN 2.5 MG/1
TABLET ORAL
Qty: 45 TABLET | Refills: 1
Start: 2022-11-15

## 2022-11-15 RX ORDER — FLUOCINOLONE ACETONIDE 0.1 MG/G
CREAM TOPICAL 2 TIMES DAILY
Qty: 30 G | Refills: 0 | Status: SHIPPED | OUTPATIENT
Start: 2022-11-15

## 2022-11-15 RX ORDER — OXYCODONE AND ACETAMINOPHEN 10; 325 MG/1; MG/1
1 TABLET ORAL
Qty: 90 TABLET | Refills: 0 | Status: SHIPPED | OUTPATIENT
Start: 2023-01-26 | End: 2023-02-25

## 2022-11-15 RX ORDER — OXYCODONE AND ACETAMINOPHEN 10; 325 MG/1; MG/1
1 TABLET ORAL
Qty: 90 TABLET | Refills: 0 | Status: SHIPPED | OUTPATIENT
Start: 2022-12-28 | End: 2023-01-27

## 2022-11-15 NOTE — PATIENT INSTRUCTIONS

## 2022-11-15 NOTE — PROGRESS NOTES
Progress Note    she is a 61y.o. year old female who presents for evalution. Subjective:     Patient here for follow-up on her INR currently slightly low at 1.6. We will adjust dose. She is also having itching on her back by shoulders, feels like bugs. No lesion present tho. States driving her nuts with the itching. She is also having a revision of her knee and is having significant pain, unable to really bear any weight. Scheduled for Feb now. Feels like this is making her depression worse. She does not want to take anything and we esthela have her check back in a month for INR and to see how she is doing. Need post dated refill of oxycodone for her chronic joint and chest pain. Sig reduction in pain which helps with QOL. No issues with over sedation, no hx of abuse.  checked and appropriate. No ind for naltrexone. Surgeon will manage post op pain. Reviewed PmHx, RxHx, FmHx, SocHx, AllgHx and updated and dated in the chart. Review of Systems - negative except as listed above in the HPI    Objective:     Vitals:    11/15/22 1542   BP: (!) 90/55   Pulse: 78   Resp: 16   Temp: 98 °F (36.7 °C)   TempSrc: Oral   SpO2: 99%   Weight: 268 lb (121.6 kg)   Height: 5' 10\" (1.778 m)       Current Outpatient Medications   Medication Sig    warfarin (COUMADIN) 2.5 mg tablet Take 1 mg by mouth every Monday, Friday. Alternate with 2.5mg the other 5 days    fluocinoLONE (SYNALAR) 0.01 % cream Apply  to affected area two (2) times a day. sucralfate (CARAFATE) 1 gram tablet Take 1 tablet by mouth 4 times daily    folic acid (FOLVITE) 1 mg tablet Take 1 Tablet by mouth daily. [START ON 1/26/2023] oxyCODONE-acetaminophen (PERCOCET 10)  mg per tablet Take 1 Tablet by mouth every eight (8) hours as needed for Pain for up to 30 days. Max Daily Amount: 3 Tablets.     [START ON 12/28/2022] oxyCODONE-acetaminophen (PERCOCET 10)  mg per tablet Take 1 Tablet by mouth every eight (8) hours as needed for Pain for up to 30 days. Max Daily Amount: 3 Tablets. [START ON 11/29/2022] oxyCODONE-acetaminophen (PERCOCET 10)  mg per tablet Take 1 Tablet by mouth every eight (8) hours as needed for Pain for up to 30 days. Max Daily Amount: 3 Tablets. midodrine (PROAMATINE) 2.5 mg tablet Take 1 tablet by mouth twice daily    potassium chloride (K-DUR, KLOR-CON M20) 20 mEq tablet Take 1 Tablet by mouth daily. carvediloL (COREG) 3.125 mg tablet Take 1 Tablet by mouth two (2) times daily (with meals). isosorbide mononitrate ER (IMDUR) 30 mg tablet Take 1 Tablet by mouth daily. pantoprazole (PROTONIX) 40 mg tablet Take 1 Tablet by mouth Before breakfast and dinner. Indications: gastritis    erythromycin (ERYC) 250 mg capsule Take 1 Capsule by mouth Before breakfast, lunch, and dinner. Indications: stomach muscle paralysis and decreased function    polyethylene glycol (MIRALAX) 17 gram packet Take 1 Packet by mouth daily as needed for Constipation. nitroglycerin (NITROSTAT) 0.4 mg SL tablet 1 Tablet by SubLINGual route every five (5) minutes as needed for Chest Pain. Up to 3 doses. triamcinolone acetonide (KENALOG) 0.1 % ointment Apply  to affected area two (2) times a day. use thin layer    ondansetron (ZOFRAN ODT) 4 mg disintegrating tablet Take 1 Tablet by mouth every eight (8) hours as needed for Nausea or Vomiting. atorvastatin (LIPITOR) 80 mg tablet TAKE 1 TABLET BY MOUTH ONCE DAILY NIGHTLY    CALCITRIOL PO Take 0.5 mg by mouth. albuterol (PROAIR HFA) 90 mcg/actuation inhaler Take 2 Puffs by inhalation every four (4) hours as needed for Wheezing. Oxygen O2 2L NC 24/7    allopurinoL (ZYLOPRIM) 100 mg tablet Take 1 tablet by mouth once daily     No current facility-administered medications for this visit.        Physical Examination: General appearance - alert, well appearing, and in no distress  Mental status - alert, oriented to person, place, and time  Skin - no lesions on upper back      Diagnoses and all orders for this visit:    1. Encounter for monitoring Coumadin therapy  -     AMB POC PT/INR  Low adjust dose  2. Other pulmonary embolism without acute cor pulmonale, unspecified chronicity (HCC)  -     warfarin (COUMADIN) 2.5 mg tablet; Take 1 mg by mouth every Monday, Friday. Alternate with 2.5mg the other 5 days    3. Pruritus  -     fluocinoLONE (SYNALAR) 0.01 % cream; Apply  to affected area two (2) times a day. 4. Positive depression screening   She would like to forego medication will reevaluate at 1 month INR check. 5. Chronic chest pain  -     oxyCODONE-acetaminophen (PERCOCET 10)  mg per tablet; Take 1 Tablet by mouth every eight (8) hours as needed for Pain for up to 30 days. Max Daily Amount: 3 Tablets. -     oxyCODONE-acetaminophen (PERCOCET 10)  mg per tablet; Take 1 Tablet by mouth every eight (8) hours as needed for Pain for up to 30 days. Max Daily Amount: 3 Tablets. -     oxyCODONE-acetaminophen (PERCOCET 10)  mg per tablet; Take 1 Tablet by mouth every eight (8) hours as needed for Pain for up to 30 days. Max Daily Amount: 3 Tablets. 6. Chronic bilateral low back pain, unspecified whether sciatica present  -     oxyCODONE-acetaminophen (PERCOCET 10)  mg per tablet; Take 1 Tablet by mouth every eight (8) hours as needed for Pain for up to 30 days. Max Daily Amount: 3 Tablets. -     oxyCODONE-acetaminophen (PERCOCET 10)  mg per tablet; Take 1 Tablet by mouth every eight (8) hours as needed for Pain for up to 30 days. Max Daily Amount: 3 Tablets. -     oxyCODONE-acetaminophen (PERCOCET 10)  mg per tablet; Take 1 Tablet by mouth every eight (8) hours as needed for Pain for up to 30 days. Max Daily Amount: 3 Tablets. 7. Chronic pain of right knee  -     oxyCODONE-acetaminophen (PERCOCET 10)  mg per tablet; Take 1 Tablet by mouth every eight (8) hours as needed for Pain for up to 30 days.  Max Daily Amount: 3 Tablets. -     oxyCODONE-acetaminophen (PERCOCET 10)  mg per tablet; Take 1 Tablet by mouth every eight (8) hours as needed for Pain for up to 30 days. Max Daily Amount: 3 Tablets. -     oxyCODONE-acetaminophen (PERCOCET 10)  mg per tablet; Take 1 Tablet by mouth every eight (8) hours as needed for Pain for up to 30 days. Max Daily Amount: 3 Tablets. Other orders  -     sucralfate (CARAFATE) 1 gram tablet; Take 1 tablet by mouth 4 times daily  -     folic acid (FOLVITE) 1 mg tablet; Take 1 Tablet by mouth daily. Follow-up and Dispositions    Return in about 4 weeks (around 12/13/2022), or if symptoms worsen or fail to improve. I have discussed the diagnosis with the patient and the intended plan as seen in the above orders. The patient has received an after-visit summary and questions were answered concerning future plans. Pt conveyed understanding of plan.     Medication Side Effects and Warnings were discussed with patient    An electronic signature was used to authenticate this note  Lyric Paez DO

## 2022-11-15 NOTE — PROGRESS NOTES
Chief Complaint   Patient presents with    Follow-up     INR     Patient presents in office today for INR check. Has c/o a black spot across her shoulders that itches. No other concerns. 1. Have you been to the ER, urgent care clinic since your last visit? Hospitalized since your last visit? No    2. Have you seen or consulted any other health care providers outside of the [address] [place] [address] since your last visit? Include any pap smears or colon screening.  No    Learning Assessment 6/28/2019   PRIMARY LEARNER Patient   PRIMARY LANGUAGE ENGLISH   LEARNER PREFERENCE PRIMARY LISTENING   ANSWERED BY patient    RELATIONSHIP SELF

## 2022-12-03 NOTE — PROGRESS NOTES
Ana Garza  YOB: 1955      Assessment & Plan:     [facility] :under my care  - 40lbs fluid removal during last [facility] admission over few weeks  - Appears not able to tolerate euvolemia. - she does NOT want PD at this time, she has AVF. She wants to resume HD at [facility]. She will need chair. Hypokalemia  - due to PD loss  Hypotension  - Midodrine  N/V   - she thinks from day time dwell, it might be gastroparesis + poor KTV  HFpEF    Plan  - DC [facility]   - HD Tomorrow and TTS  -K[facility]  -ALDO            Subjective:   CC: F/U [facility] -[facility] admitted with N/V/Dizziness  HPI: Patient seen, No UF with PD.  Does not want PD.  ROS:Tiredness +  Current Facility-Administered Medications   Medication Dose Route Frequency    polyethylene glycol (MIRALAX) packet 17 g  17 g Oral DAILY    erythromycin lactobionate (ERYTHROCIN) 350 mg in 0.9% sodium chloride 250 mL IVPB  350 mg IntraVENous Q8H    [START ON 9/6/2022] epoetin charlie-epbx (RETACRIT) injection 10,000 Units  10,000 Units IntraVENous DIALYSIS TUE, THU & SAT    pantoprazole (PROTONIX) 40 mg in 0.9% sodium chloride 10 mL injection  40 mg IntraVENous Q12H    bisacodyL (DULCOLAX) tablet 5 mg  5 mg Oral DAILY    senna-docusate (PERICOLACE) 8.6-50 mg per tablet 1 Tablet  1 Tablet Oral DAILY    Warfarin Pharmacy to dose   Other Rx Dosing/Monitoring    ondansetron (ZOFRAN) injection 8 mg  8 mg IntraVENous Q6H    gentamicin (GARAMYCIN) 0.1 % ointment   Topical DAILY    gentamicin (GARAMYCIN) 0.1 % cream   Topical DIALYSIS PRN    nitroglycerin (NITROSTAT) tablet 0.4 mg  0.4 mg SubLINGual Q5MIN PRN    sodium chloride (NS) flush 5-40 mL  5-40 mL IntraVENous Q8H    sodium chloride (NS) flush 5-40 mL  5-40 mL IntraVENous PRN    acetaminophen (TYLENOL) tablet 650 mg  650 mg Oral Q6H PRN    Or    acetaminophen (TYLENOL) suppository 650 mg  650 mg Rectal Q6H PRN    albuterol-ipratropium (DUO-NEB) 2.5 MG-0.5 MG/3 ML  3 mL Nebulization Q6H PRN    allopurinoL (ZYLOPRIM) tablet 100 mg  100 mg Oral DAILY    atorvastatin (LIPITOR) tablet 80 mg  80 mg Oral QHS    calcitRIOL (ROCALTROL) capsule 0.5 mcg  0.5 mcg Oral DAILY    folic acid (FOLVITE) tablet 1 mg  1 mg Oral DAILY    midodrine (PROAMATINE) tablet 2.5 mg  2.5 mg Oral BID    potassium chloride SR (KLOR-CON 10) tablet 20 mEq  20 mEq Oral DAILY    sucralfate (CARAFATE) tablet 1 g  1 g Oral AC&HS          Objective:     Vitals:  Blood pressure (!) 147/78, pulse (!) 106, temperature 98.8 °F (37.1 °C), resp. rate 18, height 5' 10\" (1.778 m), weight 118.8 kg (262 lb), SpO2 100 %. Temp (24hrs), Av.6 °F (37 °C), Min:98 °F (36.7 °C), Max:98.8 °F (37.1 °C)      Intake and Output:  No intake/output data recorded.  1901 -  0700  In: 100 [P.O.:100]  Out: 27     Physical Exam:                   Physical Examination:   GENERAL ASSESSMENT: NAD  HEENT:Nontraumatic   CHEST: CTA  HEART: S1S2  ABDOMEN: Soft,NT,  :Myers:   EXTREMITY: EDEMA  NEURO:Grossly non focal          ECG/rhythm:    Data Review      No results for input(s): TNIPOC in the last 72 hours. No lab exists for component: ITNL   No results for input(s): CPK, CKMB, TROIQ in the last 72 hours.   Recent Labs     22  0315 22  0420 22  1700 22  0301   * 133*  --  133*   K 3.5 3.5  --  3.1*   CL 98 98  --  99   CO2 27 26  --  25   BUN 50* 49*  --  53*   CREA 10.30* 10.20*  --  9.87*   GLU 93 98  --  101*   CA 8.8 8.5  --  8.3*   WBC 10.3 10.4  --  11.8*   HGB 7.0* 7.0* 7.1* 7.3*   HCT 22.1* 22.1* 22.2* 23.0*    183  --  191        Recent Labs     22  0315 22  0420 22  1700   INR 1.3* 1.3* 2.3*   PTP 12.9* 13.7* 22.5*       Needs: urine analysis, urine sodium, protein and creatinine  Lab Results   Component Value Date/Time    Sodium,urine random 25 11/10/2014 12:40 PM    Creatinine, urine 145.29 2017 05:01 AM               Discussed with:   Patient     : Jayde Márquez MD  2022        [facility]:  [email or web address]  Gibran Guerrero office:  [address] [place] [address], [address] [place] [address],[address]  [place], [address] [facility]  Phone: [phone]  Fax :     [phone]    [facility] office:  [address] [facility], [address]  Phone - [phone]  Fax - [phone]

## 2022-12-12 ENCOUNTER — TELEPHONE (OUTPATIENT)
Dept: FAMILY MEDICINE CLINIC | Age: 67
End: 2022-12-12

## 2022-12-12 NOTE — TELEPHONE ENCOUNTER
Disha Lindo, pharmacist, with [facility] needs patient's last INR & Warfarin info.       #[phone]

## 2022-12-15 ENCOUNTER — TELEPHONE (OUTPATIENT)
Dept: FAMILY MEDICINE CLINIC | Age: 67
End: 2022-12-15

## 2022-12-15 NOTE — TELEPHONE ENCOUNTER
Mariposa Cox from [place] [address] called and would like a call back. She states that she spoke with [facility] on Tuesday and got the patients INR and dosage. She states that the patient is telling her something different.  Please call her at [phone]

## 2022-12-16 NOTE — TELEPHONE ENCOUNTER
Spoke to Shady Rodriguez. Patient x2 id verified. Informed her the warfarin dosage as listed CC and per Dr. Marie Garcia. She verbalized understanding.

## 2022-12-20 ENCOUNTER — TELEPHONE (OUTPATIENT)
Dept: FAMILY MEDICINE CLINIC | Age: 67
End: 2022-12-20

## 2022-12-20 NOTE — TELEPHONE ENCOUNTER
Charito Pereira/NP [facility] would like to speak with nurse concerning INR. Ms Randall's direct phone: [phone]. She stated she will be seeing patients all day but will try to grab her phone when you call.

## 2022-12-20 NOTE — TELEPHONE ENCOUNTER
Called and spoke with [facility]. She states that patient is scheduled for February with them for right total knee replacement. She checked patient's INR today and it was 1.6. She would like to speak with Dr. Sherri Ramirez to discuss bridging patient's coumadin for procedure. [facility]  \[facility] [place] [facility]\"" [address] [facility] [address] [facility]  Dept: [phone]  Dept Fax: [phone]: [phone]     Visit Date:  7/1/2022    Patient:  Roxanne Benito. YOB: 1998    HPI:     Willy Hu presents today for medical evaluation of severe opioid use disorder    I have not seen him previously    States that when he was 14, he started using cocaine and heroin. Parents have struggled with addiction as well. Mother is currently in recovery. Has never used IV    From [place] - moved here in 2013. He had 8 months clean until 4 weeks ago- detox, inpatient, sober living- got his life together, car, job. Was on suboxone. They weaned him off, and he relapsed    Possession of fentanyl in [facility] [place]. Charges pending. Last use yesterday morning. Using 0.5-1 gram per day. vivitrol not helpful previously    Urges and cravings previously controlled with suboxone 4 mg BID    COWS- 14    Discussed at length all MAT options including buprenorphine, naltrexone, and methadone. Wishes to pursue treatment with buprenorphine at this time. Discussed potential for overdose and/or precipitated withdrawal. Understanding voiced. Medications    Current Outpatient Medications:     naloxone 4 MG/0.1ML LIQD nasal spray, 1 spray by Nasal route as needed for Opioid Reversal, Disp: 1 each, Rfl: 1    buprenorphine-naloxone (SUBOXONE) 2-0.5 MG SUBL, Place 6 tablets under the tongue daily for 7 days. , Disp: 42 tablet, Rfl: 0    benzonatate (TESSALON PERLES) 100 MG capsule, Take 1 capsule by mouth 3 times daily as needed for Cough, Disp: 30 capsule, Rfl: 1    ondansetron (ZOFRAN ODT) 4 MG disintegrating tablet, Take 1 tablet by mouth every 8 hours as needed for Nausea (Patient not taking: Reported on 3/18/2022), Disp: 20 tablet, Rfl: 0    mirtazapine (REMERON) 45 MG tablet, Take 45 mg by mouth nightly, Disp: , Rfl:     LORazepam (ATIVAN) 1 MG tablet, Take 1 mg by mouth daily. (Patient not taking: Reported on 3/18/2022), Disp: , Rfl:     cloNIDine (CATAPRES) 0.1 MG tablet, Take 0.1 mg by mouth nightly, Disp: , Rfl:     The patient is allergic to amoxicillin, dextromethorphan, and amoxicillin-pot clavulanate. Past Medical History  Agapito Jarrett  has a past medical history of Bipolar 1 disorder ([facility] [address] Opiate addiction ([facility] [address] Seizures ([facility] [address] and Tourette disease. Past Surgical History  The patient  has a past surgical history that includes Spine surgery. Family History  This patient's family history is not on file. Social History  Agapito Jarrett  reports that he has never smoked. He has never used smokeless tobacco. He reports previous alcohol use. He reports previous drug use. Health Maintenance:    Health Maintenance   Topic Date Due    COVID-19 Vaccine (1) Never done    HIV screen  Never done    HPV vaccine (3 - Male 3-dose series) 05/20/2016    Hepatitis C screen  Never done    Flu vaccine (1) 09/01/2022    DTaP/Tdap/Td vaccine (7 - Td or Tdap) 09/19/2022    Depression Screen  03/18/2023    Hepatitis A vaccine  Completed    Hepatitis B vaccine  Completed    Hib vaccine  Completed    Varicella vaccine  Completed    Meningococcal (ACWY) vaccine  Aged Out    Pneumococcal 0-64 years Vaccine  Aged Out       Subjective:      Review of Systems   Constitutional: Positive for chills, diaphoresis and fatigue. Negative for fever. HENT: Negative for congestion, rhinorrhea, sinus pressure, sinus pain, sore throat, tinnitus and trouble swallowing. Eyes: Negative for photophobia and visual disturbance. Respiratory: Negative for cough, shortness of breath and wheezing. Cardiovascular: Negative for chest pain, palpitations and leg swelling. Gastrointestinal: Positive for abdominal pain and nausea. Negative for abdominal distention, blood in stool, constipation, diarrhea and vomiting.    Endocrine: 11/27/2020    CREATININE 0.84 11/27/2020    BUN 12 11/27/2020    CO2 30 11/27/2020       Assessment/Plan      1. Severe opioid use disorder (HCC)    - Hepatitis C Antibody; Future  - HIV Rapid 1&2; Future  - naloxone 4 MG/0.1ML LIQD nasal spray; 1 spray by Nasal route as needed for Opioid Reversal  Dispense: 1 each; Refill: 1  - POCT Rapid Drug Screen  - In office induction completed, tolerated well  - Start Suboxone 4 mg BID  - OARRS reviewed, no discrepancies  - Encouraged to attend NA/AA meetings and/or arrange for individualized counseling      Return in about 4 days (around 7/5/2022). Patient given educational materials - see patient instructions. Discussed use, benefit, and side effects of prescribed medications. All patient questions answered. Pt voiced understanding. Reviewed health maintenance.        Electronically signed BOBBY Hart - CNP on 7/1/22 at 9:12 AM EDT

## 2022-12-21 NOTE — TELEPHONE ENCOUNTER
Called and spoke with patient. Advised that Dr. Manohar Lowery would like to switch her to Eliquis 2.5 MG BID and she will take this until New years Joceline evening. Then she will take nothing until her surgery and the hospital will then bridge her back onto the coumadin. Patient verbalized understanding and she will have her   the Eliquis.

## 2022-12-21 NOTE — TELEPHONE ENCOUNTER
I spoke with nurse practitioner with orthopedics. She wants patient to be bridged to surgery on anticoagulation. I would like to just use Eliquis 2.5 twice daily. She can pick this up today I have plenty of samples and she would stop the Coumadin start the Eliquis and then her last dose of Eliquis before her surgery would be New Year's Joceline in the evening. She would take nothing after New Year's Joceline until her surgery and then they will bridge her in the hospital back onto her Coumadin.

## 2022-12-22 ENCOUNTER — TELEPHONE (OUTPATIENT)
Dept: FAMILY MEDICINE CLINIC | Age: 67
End: 2022-12-22

## 2022-12-22 RX ORDER — AZITHROMYCIN 250 MG/1
TABLET, FILM COATED ORAL
Qty: 6 TABLET | Refills: 0 | Status: SHIPPED | OUTPATIENT
Start: 2022-12-22

## 2022-12-22 NOTE — TELEPHONE ENCOUNTER
Arnaldo sent in.   While taking this just take the Coumadin every other day because this will otherwise raise her level

## 2022-12-22 NOTE — TELEPHONE ENCOUNTER
PT is having severe chest congestion and would like to know if provider can call in a script for her, last time she experienced this chest congestion a z-maggie was called in and she would like to know if that can be done again or if possible if provider can do a virtual visit today. She does have an appt on the 27th but congestion and tightness is getting worse. PT is scheduled for surgery in a couple of weeks and would like to get this cleared up so surgery is not cancelled.     Please call PT and let her know if script can be called in or if a virtual  can be done today    # [phone]

## 2022-12-27 ENCOUNTER — OFFICE VISIT (OUTPATIENT)
Dept: FAMILY MEDICINE CLINIC | Age: 67
End: 2022-12-27
Payer: MEDICARE

## 2022-12-27 ENCOUNTER — HOSPITAL ENCOUNTER (OUTPATIENT)
Dept: GENERAL RADIOLOGY | Age: 67
Discharge: HOME | End: 2022-12-27
Payer: MEDICARE

## 2022-12-27 VITALS
HEIGHT: 70 IN | RESPIRATION RATE: 22 BRPM | TEMPERATURE: 98.4 F | WEIGHT: 278 LBS | DIASTOLIC BLOOD PRESSURE: 76 MMHG | SYSTOLIC BLOOD PRESSURE: 128 MMHG | OXYGEN SATURATION: 95 % | BODY MASS INDEX: 39.8 KG/M2 | HEART RATE: 84 BPM

## 2022-12-27 DIAGNOSIS — K21.9 GASTROESOPHAGEAL REFLUX DISEASE, UNSPECIFIED WHETHER ESOPHAGITIS PRESENT: ICD-10-CM

## 2022-12-27 DIAGNOSIS — R09.89 CHEST CONGESTION: ICD-10-CM

## 2022-12-27 DIAGNOSIS — Z79.01 WARFARIN ANTICOAGULATION: Primary | ICD-10-CM

## 2022-12-27 DIAGNOSIS — I48.20 CHRONIC ATRIAL FIBRILLATION (HCC): ICD-10-CM

## 2022-12-27 DIAGNOSIS — D68.9 COAGULATION DEFECT (HCC): ICD-10-CM

## 2022-12-27 PROBLEM — E23.7 PITUITARY ABNORMALITY (HCC): Status: RESOLVED | Noted: 2022-12-27 | Resolved: 2022-12-27

## 2022-12-27 PROBLEM — E23.7 PITUITARY ABNORMALITY (HCC): Status: ACTIVE | Noted: 2022-12-27

## 2022-12-27 LAB
INR BLD: 1.3
PT POC: NORMAL
VALID INTERNAL CONTROL?: YES

## 2022-12-27 PROCEDURE — G8510 SCR DEP NEG, NO PLAN REQD: HCPCS | Performed by: FAMILY MEDICINE

## 2022-12-27 PROCEDURE — G8400 PT W/DXA NO RESULTS DOC: HCPCS | Performed by: FAMILY MEDICINE

## 2022-12-27 PROCEDURE — 1090F PRES/ABSN URINE INCON ASSESS: CPT | Performed by: FAMILY MEDICINE

## 2022-12-27 PROCEDURE — G8417 CALC BMI ABV UP PARAM F/U: HCPCS | Performed by: FAMILY MEDICINE

## 2022-12-27 PROCEDURE — 99214 OFFICE O/P EST MOD 30 MIN: CPT | Performed by: FAMILY MEDICINE

## 2022-12-27 PROCEDURE — 3017F COLORECTAL CA SCREEN DOC REV: CPT | Performed by: FAMILY MEDICINE

## 2022-12-27 PROCEDURE — G8536 NO DOC ELDER MAL SCRN: HCPCS | Performed by: FAMILY MEDICINE

## 2022-12-27 PROCEDURE — 3074F SYST BP LT 130 MM HG: CPT | Performed by: FAMILY MEDICINE

## 2022-12-27 PROCEDURE — G8427 DOCREV CUR MEDS BY ELIG CLIN: HCPCS | Performed by: FAMILY MEDICINE

## 2022-12-27 PROCEDURE — G0463 HOSPITAL OUTPT CLINIC VISIT: HCPCS | Performed by: FAMILY MEDICINE

## 2022-12-27 PROCEDURE — 71046 X-RAY EXAM CHEST 2 VIEWS: CPT

## 2022-12-27 PROCEDURE — 1123F ACP DISCUSS/DSCN MKR DOCD: CPT | Performed by: FAMILY MEDICINE

## 2022-12-27 PROCEDURE — 1101F PT FALLS ASSESS-DOCD LE1/YR: CPT | Performed by: FAMILY MEDICINE

## 2022-12-27 PROCEDURE — 3078F DIAST BP <80 MM HG: CPT | Performed by: FAMILY MEDICINE

## 2022-12-27 PROCEDURE — 85610 PROTHROMBIN TIME: CPT | Performed by: FAMILY MEDICINE

## 2022-12-27 RX ORDER — DOXYCYCLINE 100 MG/1
100 TABLET ORAL 2 TIMES DAILY
Qty: 14 TABLET | Refills: 0 | Status: SHIPPED | OUTPATIENT
Start: 2022-12-27 | End: 2022-12-29 | Stop reason: SDUPTHER

## 2022-12-27 RX ORDER — SUCRALFATE 1 G/1
TABLET ORAL
Qty: 120 TABLET | Refills: 5 | Status: SHIPPED | OUTPATIENT
Start: 2022-12-27

## 2022-12-27 RX ORDER — METOPROLOL SUCCINATE 50 MG/1
25 TABLET, EXTENDED RELEASE ORAL
COMMUNITY
Start: 2022-11-30

## 2022-12-27 NOTE — PATIENT INSTRUCTIONS

## 2022-12-27 NOTE — PROGRESS NOTES
Chief Complaint   Patient presents with    Follow-up    Labs    Cold Symptoms     Pt being seen for inr check    Pt also states she started with cold sx last week  -pt states she has been having a cough and SOB  -pt has treated with prescribed med and also otc med    1. Have you been to the ER, urgent care clinic since your last visit? Hospitalized since your last visit? No    2. Have you seen or consulted any other health care providers outside of the [address] [place] [address] since your last visit? Include any pap smears or colon screening.  No    Pt has no other concerns

## 2022-12-27 NOTE — PROGRESS NOTES
Progress Note    she is a 74y.o. year old female who presents for evalution. Subjective:     Pt for INR check scheduled for knee replacement/revision and she is under 2 now on INR and will switch to eliquis 2.5 bid which we gave her samples, last dose will be 12/31 evening per NP with orthopedics that I had spoken to and they will bridge at [address] [employer] [employer] post op. She has continued chest congestion despite z-pack. She did get flu shot. Had a fever a couple times and last was Sunday 100 then went back down. Covid test was negative and she plans to take another today. Buttocks wound resolved per patient  Reviewed PmHx, RxHx, FmHx, SocHx, AllgHx and updated and dated in the chart. Review of Systems - negative except as listed above in the HPI    Objective:     Vitals:    12/27/22 1039   BP: 128/76   Pulse: 84   Resp: 22   Temp: 98.4 °F (36.9 °C)   TempSrc: Oral   SpO2: 95%   Weight: 278 lb (126.1 kg)   Height: 5' 10\" (1.778 m)       Current Outpatient Medications   Medication Sig    metoprolol succinate (TOPROL-XL) 50 mg XL tablet 25 mg.    sucralfate (CARAFATE) 1 gram tablet Take 1 tablet by mouth 4 times daily    doxycycline (ADOXA) 100 mg tablet Take 1 Tablet by mouth two (2) times a day for 7 days. warfarin (COUMADIN) 2.5 mg tablet Take 1 mg by mouth every Monday, Friday. Alternate with 2.5mg the other 5 days    folic acid (FOLVITE) 1 mg tablet Take 1 Tablet by mouth daily. [START ON 1/26/2023] oxyCODONE-acetaminophen (PERCOCET 10)  mg per tablet Take 1 Tablet by mouth every eight (8) hours as needed for Pain for up to 30 days. Max Daily Amount: 3 Tablets. midodrine (PROAMATINE) 2.5 mg tablet Take 1 tablet by mouth twice daily    potassium chloride (K-DUR, KLOR-CON M20) 20 mEq tablet Take 1 Tablet by mouth daily. isosorbide mononitrate ER (IMDUR) 30 mg tablet Take 1 Tablet by mouth daily. pantoprazole (PROTONIX) 40 mg tablet Take 1 Tablet by mouth Before breakfast and dinner. Indications: gastritis    polyethylene glycol (MIRALAX) 17 gram packet Take 1 Packet by mouth daily as needed for Constipation. nitroglycerin (NITROSTAT) 0.4 mg SL tablet 1 Tablet by SubLINGual route every five (5) minutes as needed for Chest Pain. Up to 3 doses. ondansetron (ZOFRAN ODT) 4 mg disintegrating tablet Take 1 Tablet by mouth every eight (8) hours as needed for Nausea or Vomiting. atorvastatin (LIPITOR) 80 mg tablet TAKE 1 TABLET BY MOUTH ONCE DAILY NIGHTLY    allopurinoL (ZYLOPRIM) 100 mg tablet Take 1 tablet by mouth once daily    CALCITRIOL PO Take 0.5 mg by mouth. albuterol (PROAIR HFA) 90 mcg/actuation inhaler Take 2 Puffs by inhalation every four (4) hours as needed for Wheezing. Oxygen O2 2L NC 24/7    azithromycin (ZITHROMAX) 250 mg tablet Take 2 tablets today, then take 1 tablet daily (Patient not taking: Reported on 12/27/2022)    fluocinoLONE (SYNALAR) 0.01 % cream Apply  to affected area two (2) times a day. (Patient not taking: Reported on 12/27/2022)    [START ON 12/28/2022] oxyCODONE-acetaminophen (PERCOCET 10)  mg per tablet Take 1 Tablet by mouth every eight (8) hours as needed for Pain for up to 30 days. Max Daily Amount: 3 Tablets. (Patient not taking: Reported on 12/27/2022)    oxyCODONE-acetaminophen (PERCOCET 10)  mg per tablet Take 1 Tablet by mouth every eight (8) hours as needed for Pain for up to 30 days. Max Daily Amount: 3 Tablets. (Patient not taking: Reported on 12/27/2022)    carvediloL (COREG) 3.125 mg tablet Take 1 Tablet by mouth two (2) times daily (with meals). (Patient not taking: Reported on 12/27/2022)    erythromycin [facility] [place]) 250 mg capsule Take 1 Capsule by mouth Before breakfast, lunch, and dinner. Indications: stomach muscle paralysis and decreased function (Patient not taking: Reported on 12/27/2022)    triamcinolone acetonide (KENALOG) 0.1 % ointment Apply  to affected area two (2) times a day.  use thin layer (Patient not taking: Reported on 12/27/2022)     No current facility-administered medications for this visit. Physical Examination: General appearance - alert, well appearing, and in no distress  Chest -very congested sounding rhonchi bilateral.  Had her cough some to try and clear it up she was still congested though slightly improved. Assessment/ Plan:   Diagnoses and all orders for this visit:    1. Warfarin anticoagulation  -     AMB POC PT/INR  Less than 2. Switch to Eliquis 2.5 twice daily she has samples  2. Chronic atrial fibrillation (HCC)  On anticoagulation  3. Coagulation defect ([address]    4. Gastroesophageal reflux disease, unspecified whether esophagitis present  -     sucralfate (CARAFATE) 1 gram tablet; Take 1 tablet by mouth 4 times daily    5. Chest congestion  -     XR CHEST PA LAT; Future  -     doxycycline (ADOXA) 100 mg tablet; Take 1 Tablet by mouth two (2) times a day for 7 days. Discussed with patient if showing pneumonia she will need to reschedule her surgery. Follow-up and Dispositions    Return if symptoms worsen or fail to improve. I have discussed the diagnosis with the patient and the intended plan as seen in the above orders. The patient has received an after-visit summary and questions were answered concerning future plans. Pt conveyed understanding of plan.     Medication Side Effects and Warnings were discussed with patient    An electronic signature was used to authenticate this note  Comfort Dodson DO

## 2022-12-29 ENCOUNTER — TELEPHONE (OUTPATIENT)
Dept: FAMILY MEDICINE CLINIC | Age: 67
End: 2022-12-29

## 2022-12-29 ENCOUNTER — APPOINTMENT (OUTPATIENT)
Dept: GENERAL RADIOLOGY | Age: 67
End: 2022-12-29
Attending: STUDENT IN AN ORGANIZED HEALTH CARE EDUCATION/TRAINING PROGRAM
Payer: MEDICARE

## 2022-12-29 ENCOUNTER — HOSPITAL ENCOUNTER (EMERGENCY)
Age: 67
Discharge: HOME | End: 2022-12-29
Attending: STUDENT IN AN ORGANIZED HEALTH CARE EDUCATION/TRAINING PROGRAM
Payer: MEDICARE

## 2022-12-29 VITALS
DIASTOLIC BLOOD PRESSURE: 78 MMHG | HEIGHT: 70 IN | TEMPERATURE: 98.5 F | RESPIRATION RATE: 18 BRPM | SYSTOLIC BLOOD PRESSURE: 157 MMHG | OXYGEN SATURATION: 98 % | BODY MASS INDEX: 39.65 KG/M2 | WEIGHT: 277 LBS | HEART RATE: 106 BPM

## 2022-12-29 DIAGNOSIS — J18.9 COMMUNITY ACQUIRED PNEUMONIA OF RIGHT LUNG, UNSPECIFIED PART OF LUNG: Primary | ICD-10-CM

## 2022-12-29 DIAGNOSIS — R09.89 CHEST CONGESTION: ICD-10-CM

## 2022-12-29 LAB
ANION GAP SERPL CALC-SCNC: 11 MMOL/L (ref 5–15)
BASOPHILS # BLD: 0 K/UL (ref 0–0.1)
BASOPHILS NFR BLD: 0 % (ref 0–1)
BUN SERPL-MCNC: 33 MG/DL (ref 6–20)
BUN/CREAT SERPL: 3 (ref 12–20)
CALCIUM SERPL-MCNC: 8.5 MG/DL (ref 8.5–10.1)
CHLORIDE SERPL-SCNC: 100 MMOL/L (ref 97–108)
CO2 SERPL-SCNC: 28 MMOL/L (ref 21–32)
COMMENT, HOLDF: NORMAL
CREAT SERPL-MCNC: 9.65 MG/DL (ref 0.55–1.02)
DIFFERENTIAL METHOD BLD: ABNORMAL
EOSINOPHIL # BLD: 0.7 K/UL (ref 0–0.4)
EOSINOPHIL NFR BLD: 8 % (ref 0–7)
ERYTHROCYTE [DISTWIDTH] IN BLOOD BY AUTOMATED COUNT: 14.7 % (ref 11.5–14.5)
GLUCOSE SERPL-MCNC: 102 MG/DL (ref 65–100)
HCT VFR BLD AUTO: 26.2 % (ref 35–47)
HGB BLD-MCNC: 8.1 G/DL (ref 11.5–16)
IMM GRANULOCYTES # BLD AUTO: 0.1 K/UL (ref 0–0.04)
IMM GRANULOCYTES NFR BLD AUTO: 1 % (ref 0–0.5)
LYMPHOCYTES # BLD: 1.8 K/UL (ref 0.8–3.5)
LYMPHOCYTES NFR BLD: 20 % (ref 12–49)
MCH RBC QN AUTO: 32.3 PG (ref 26–34)
MCHC RBC AUTO-ENTMCNC: 30.9 G/DL (ref 30–36.5)
MCV RBC AUTO: 104.4 FL (ref 80–99)
MONOCYTES # BLD: 0.6 K/UL (ref 0–1)
MONOCYTES NFR BLD: 6 % (ref 5–13)
NEUTS SEG # BLD: 5.8 K/UL (ref 1.8–8)
NEUTS SEG NFR BLD: 65 % (ref 32–75)
NRBC # BLD: 0 K/UL (ref 0–0.01)
NRBC BLD-RTO: 0 PER 100 WBC
PLATELET # BLD AUTO: 247 K/UL (ref 150–400)
PMV BLD AUTO: 10.7 FL (ref 8.9–12.9)
POTASSIUM SERPL-SCNC: 3.3 MMOL/L (ref 3.5–5.1)
RBC # BLD AUTO: 2.51 M/UL (ref 3.8–5.2)
SAMPLES BEING HELD,HOLD: NORMAL
SODIUM SERPL-SCNC: 139 MMOL/L (ref 136–145)
WBC # BLD AUTO: 9 K/UL (ref 3.6–11)

## 2022-12-29 PROCEDURE — 85025 COMPLETE CBC W/AUTO DIFF WBC: CPT

## 2022-12-29 PROCEDURE — 71046 X-RAY EXAM CHEST 2 VIEWS: CPT

## 2022-12-29 PROCEDURE — 99284 EMERGENCY DEPT VISIT MOD MDM: CPT

## 2022-12-29 PROCEDURE — 80048 BASIC METABOLIC PNL TOTAL CA: CPT

## 2022-12-29 PROCEDURE — 36415 COLL VENOUS BLD VENIPUNCTURE: CPT

## 2022-12-29 RX ORDER — DOXYCYCLINE 100 MG/1
100 TABLET ORAL 2 TIMES DAILY
Qty: 14 TABLET | Refills: 0 | Status: SHIPPED | OUTPATIENT
Start: 2022-12-29 | End: 2023-01-05

## 2022-12-29 NOTE — TELEPHONE ENCOUNTER
Patient with bilateral pneumonia on the x-ray I had ordered. I had started her on doxycycline she went to the emergency room they did not change anything the offer to admit her she declined. She is breathing okay. Her orthopedic surgery has been canceled. She will restart Coumadin and then stop the Eliquis. She will bridge them for 3 days first since she her number was low on the Coumadin the other day. Patient will need to follow-up in the office in the next 2 to 3 weeks for a recheck of her INR level.   Please schedule her an appointment

## 2022-12-29 NOTE — ED PROVIDER NOTES
72-year-old female with history of DM, CHF, ESRD on peritoneal dialysis, interstitial lung disease on home O2 presents to the ED with chief complaint of cough for the past several days. Patient reports associated chest pain only with coughing and feeling that she is wheezing. Patient started doxycycline 1.5 days ago for pneumonia, had chest x-ray done yesterday that showed possible right-sided pneumonia. Patient is concerned that she is not improving. No fevers, chills, difficulty breathing except with ambulation. No leg swelling, abdominal pain, bowel symptoms. She has not had to increase her O2 at home. The history is provided by the patient. Wheezing   Associated symptoms include chest pain and cough. Pertinent negatives include no fever, no abdominal pain, no vomiting, no diarrhea, no dysuria, no headaches, no rhinorrhea, no neck pain and no rash. Chest Pain (Angina)   Associated symptoms include cough. Pertinent negatives include no abdominal pain, no back pain, no dizziness, no fever, no headaches, no nausea, no numbness, no vomiting and no weakness. Past Medical History:   Diagnosis Date     Sleep Apnea 2/16/2011    Compliant with c-pap. Aortic aneurysm (HCC)     Abdominal    CAD (coronary Artery Disease) Native Artery 2/16/2011    Chronic kidney disease     Hemodiaylsis  3x/week    Chronic pain     CKD (chronic kidney disease) stage 4, GFR 15-29 ml/min ([facility]) 2/10/2017    Diabetic gastroparesis (HCC)     Diastolic heart failure ([address] 10/5/2012    DM type 2 causing neurological disease ([address]     DM type 2 causing renal disease (HCC)     Insulin SS at night only PRN    DM type 2 causing vascular disease ([address]     Esophageal stricture 2012    dialted Dr. Dede Welsh    G6PD deficiency     trait    GERD (gastroesophageal reflux disease)     Gout     Heart failure ([address]     Hemodialysis access, AV graft ([address] 04/02/2017    Dialysis MWF    [address]      Hypertension     ILD (interstitial lung disease) ([facility]     open lung bx CJW 2010    Infected dental caries 3/17/2020    Infection of total right knee replacement ([facility] 3/17/2020    Loss of appetite 3/17/2020    Morbid obesity ([facility]     OA (osteoarthritis)     Ovar[facility] ([facility]     cervical and uterine    Rheumatoid arteritis (HCC)     S/P left pulmonary artery pressure sensor implant placement 8/31/2017    Cardiomems     Thromboembolus ([facility]     Right calf     Tobacco use disorder 3/21/2012    Uterine cervix cancer ([facility]     Vitamin D deficiency 8/9/2013       Past Surgical History:   Procedure Laterality Date    COLONOSCOPY N/A 6/24/2016    COLONOSCOPY performed by Comfort Seymour MD at [facility].      bilateral    HX CHOLECYSTECTOMY      HX HEART CATHETERIZATION      x 7    HX HERNIA REPAIR      HX HYSTERECTOMY      HX ORTHOPAEDIC Right 11/12/12    right knee replacement    HX ORTHOPAEDIC Bilateral     carpal tunnel repair    HX ORTHOPAEDIC Right     PLATE IN ANKLE DUE TO FRACTURE    HX SALPINGO-OOPHORECTOMY      HX TONSIL AND ADENOIDECTOMY      HX TONSILLECTOMY      HX VASCULAR ACCESS Left     Left lower arm AV fistula    IR INSERT PERITONEAL VENOUS SHUNT      MA CARDIAC SURG PROCEDURE UNLIST  02/2019    x4 with last sttent placed 2/2019.     MA CHEST SURGERY PROCEDURE UNLISTED Right     > 20 years ago  RLL removed     UPPER GI ENDOSCOPY,VINOD PINZON  6/24/2016              Family History:   Problem Relation Age of Onset    Heart Disease Mother     No Known Problems Daughter     No Known Problems Son     No Known Problems Son     Anesth Problems Neg Hx        Social History     Socioeconomic History    Marital status:      Spouse name: Not on file    Number of children: Not on file    Years of education: Not on file    Highest education level: Not on file   Occupational History    Not on file   Tobacco Use    Smoking status: Former     Packs/day: 0.50     Years: 41.00     Pack years: 20.50     Types: Cigarettes     Quit date: 2014     Years since quittin.1    Smokeless tobacco: Never   Vaping Use    Vaping Use: Never used   Substance and Sexual Activity    Alcohol use: No    Drug use: No    Sexual activity: Not on file   Other Topics Concern    Not on file   Social History Narrative    Not on file     Social Determinants of Health     Financial Resource Strain: Low Risk     Difficulty of Paying Living Expenses: Not hard at all   Food Insecurity: No Food Insecurity    Worried About Running Out of Food in the Last Year: Never true    Ran Out of Food in the Last Year: Never true   Transportation Needs: Not on file   Physical Activity: Not on file   Stress: Not on file   Social Connections: Not on file   Intimate Partner Violence: Not on file   Housing Stability: Not on file         ALLERGIES: Contrast dye [iodine], Shellfish derived, Levaquin [levofloxacin], Morphine, and Nsaids (non-steroidal anti-inflammatory drug)    Review of Systems   Constitutional:  Negative for chills and fever. HENT:  Negative for congestion and rhinorrhea. Respiratory:  Positive for cough and wheezing. Cardiovascular:  Positive for chest pain. Negative for leg swelling. Gastrointestinal:  Negative for abdominal pain, constipation, diarrhea, nausea and vomiting. Genitourinary:  Negative for difficulty urinating, dysuria and hematuria. Musculoskeletal:  Negative for back pain and neck pain. Skin:  Negative for color change and rash. Neurological:  Negative for dizziness, weakness, light-headedness, numbness and headaches. Psychiatric/Behavioral:  Negative for agitation and confusion. Vitals:    22 0340   BP: (!) 157/78   Pulse: (!) 106   Resp: 18   Temp: 98.5 °F (36.9 °C)   SpO2: 98%   Weight: 125.6 kg (277 lb)   Height: 5' 10\" (1.778 m)            Physical Exam  Constitutional:       General: She is not in acute distress. Appearance: She is well-developed. HENT:      Head: Normocephalic and atraumatic. Eyes:      General: No scleral icterus. Pupils: Pupils are equal, round, and reactive to light. Neck:      Trachea: No tracheal deviation. Cardiovascular:      Rate and Rhythm: Regular rhythm. Tachycardia present. Heart sounds: No murmur heard. No friction rub. No gallop. Pulmonary:      Effort: Pulmonary effort is normal. No respiratory distress. Breath sounds: No wheezing or rales. Comments: Coarse bilateral breath sounds  Abdominal:      General: Bowel sounds are normal. There is no distension. Palpations: Abdomen is soft. Tenderness: There is no abdominal tenderness. Comments: +peritoneal dialysis access   Musculoskeletal:         General: No deformity. Cervical back: Neck supple. Skin:     General: Skin is warm and dry. Neurological:      Mental Status: She is alert and oriented to person, place, and time. Psychiatric:         Behavior: Behavior normal.        MDM  Number of Diagnoses or Management Options  Community acquired pneumonia of right lung, unspecified part of lung  Diagnosis management comments: 81-year-old female presenting to the ED with known pneumonia, cough, dyspnea on exertion. Mildly tachycardic, satting in the upper 90s on 2 L of O2 which is her baseline. Does have some coarse bilateral breath sounds, not sure if these are at her baseline given her history of ILD. We will repeat chest x-ray and basic labs. Dispo pending labs and chest x-ray results. Amount and/or Complexity of Data Reviewed  Clinical lab tests: ordered and reviewed  Tests in the radiology section of CPT®: ordered and reviewed           Procedures    5:25 AM  Labs are benign, chest x-ray with possible worsening of pneumonia though difficult to tell in background of interstitial lung disease. Patient reevaluated, exam unchanged, still satting well.   Offered admission given possible worsening of pneumonia and significant pulmonary history, patient declined, said she would return to the ER immediately if she had any worsening of her condition. Given that she just started her doxycycline we will have her continue this for now and call her primary care doctor today to schedule close follow-up. Will discharge. DISCHARGE NOTE:  5:26 AM  The patient has been re-evaluated and feeling much better and are stable for discharge. All available radiology and laboratory results have been reviewed with patient and/or available family. Patient and/or family verbally conveyed their understanding and agreement of the patient's signs, symptoms, diagnosis, treatment and prognosis and additionally agree to follow-up as recommended in the discharge instructions or to return to the Emergency Department should their condition change or worsen prior to their follow-up appointment. All questions have been answered and patient and/or available family express understanding. LABORATORY RESULTS:  Recent Results (from the past 24 hour(s))   CBC WITH AUTOMATED DIFF    Collection Time: 12/29/22  4:06 AM   Result Value Ref Range    WBC 9.0 3.6 - 11.0 K/uL    RBC 2.51 (L) 3.80 - 5.20 M/uL    HGB 8.1 (L) 11.5 - 16.0 g/dL    HCT 26.2 (L) 35.0 - 47.0 %    .4 (H) 80.0 - 99.0 FL    MCH 32.3 26.0 - 34.0 PG    MCHC 30.9 30.0 - 36.5 g/dL    RDW 14.7 (H) 11.5 - 14.5 %    PLATELET 701 923 - 051 K/uL    MPV 10.7 8.9 - 12.9 FL    NRBC 0.0 0  WBC    ABSOLUTE NRBC 0.00 0.00 - 0.01 K/uL    NEUTROPHILS 65 32 - 75 %    LYMPHOCYTES 20 12 - 49 %    MONOCYTES 6 5 - 13 %    EOSINOPHILS 8 (H) 0 - 7 %    BASOPHILS 0 0 - 1 %    IMMATURE GRANULOCYTES 1 (H) 0.0 - 0.5 %    ABS. NEUTROPHILS 5.8 1.8 - 8.0 K/UL    ABS. LYMPHOCYTES 1.8 0.8 - 3.5 K/UL    ABS. MONOCYTES 0.6 0.0 - 1.0 K/UL    ABS. EOSINOPHILS 0.7 (H) 0.0 - 0.4 K/UL    ABS. BASOPHILS 0.0 0.0 - 0.1 K/UL    ABS. IMM.  GRANS. 0.1 (H) 0.00 - 0.04 K/UL    DF AUTOMATED     METABOLIC PANEL, BASIC Collection Time: 12/29/22  4:06 AM   Result Value Ref Range    Sodium 139 136 - 145 mmol/L    Potassium 3.3 (L) 3.5 - 5.1 mmol/L    Chloride 100 97 - 108 mmol/L    CO2 28 21 - 32 mmol/L    Anion gap 11 5 - 15 mmol/L    Glucose 102 (H) 65 - 100 mg/dL    BUN 33 (H) 6 - 20 MG/DL    Creatinine 9.65 (H) 0.55 - 1.02 MG/DL    BUN/Creatinine ratio 3 (L) 12 - 20      eGFR 4 (L) >60 ml/min/1.73m2    Calcium 8.5 8.5 - 10.1 MG/DL   SAMPLES BEING HELD    Collection Time: 12/29/22  4:06 AM   Result Value Ref Range    SAMPLES BEING HELD 1BLU,[id number]     COMMENT        Add-on orders for these samples will be processed based on acceptable specimen integrity and analyte stability, which may vary by analyte. IMAGING RESULTS:  XR CHEST PA LAT    Result Date: 12/29/2022  Increased interstitial and parenchymal opacities bilaterally pulmonary edema versus multifocal pneumonia. Algis Broaden MEDICATIONS GIVEN:  Medications - No data to display    IMPRESSION:  1.  Community acquired pneumonia of right lung, unspecified part of lung        PLAN:  Follow-up Information       Follow up With Specialties Details Why Contact Info    Jerilyn Johnson,  Family Medicine In 1 day  [address] [place] [id number]  [phone]      [facility] EMERGENCY DEPT Emergency Medicine  As needed, If symptoms worsen [address] [place] [address]  [phone]          Current Discharge Medication List          Signed By: Jatin Flynn MD     December 29, 2022

## 2022-12-29 NOTE — DISCHARGE INSTRUCTIONS
Please return to the emergency room if you have increased oxygen needs at home or have worsening shortness of breath at rest or have any fevers or confusion.

## 2022-12-29 NOTE — ED TRIAGE NOTES
Pt reports she had a Chest XR on Tuesday and was dx of PNA, was started on doxycycline. Presents today with SOB, wheezing and chest tightness.

## 2023-01-10 ENCOUNTER — TELEPHONE (OUTPATIENT)
Dept: FAMILY MEDICINE CLINIC | Age: 68
End: 2023-01-10

## 2023-01-10 NOTE — TELEPHONE ENCOUNTER
Stefani Cardoso/CRISTINA Pierre requesting record for Pneumonia vaccine.  Stefani's phone: [phone] Fax: [phone]

## 2023-01-10 NOTE — TELEPHONE ENCOUNTER
Called and left detailed VM with Rosanne King that we do not have record of a pneumonia vaccine.  We never gave her one and I checked the [facility] site and there is no record of her receiving a pneumonia vaccine on there either

## 2023-01-18 ENCOUNTER — OFFICE VISIT (OUTPATIENT)
Dept: FAMILY MEDICINE CLINIC | Age: 68
End: 2023-01-18
Payer: MEDICARE

## 2023-01-18 VITALS
HEART RATE: 99 BPM | SYSTOLIC BLOOD PRESSURE: 109 MMHG | TEMPERATURE: 98.3 F | WEIGHT: 263 LBS | RESPIRATION RATE: 16 BRPM | HEIGHT: 70 IN | OXYGEN SATURATION: 91 % | BODY MASS INDEX: 37.65 KG/M2 | DIASTOLIC BLOOD PRESSURE: 69 MMHG

## 2023-01-18 DIAGNOSIS — E11.49 TYPE 2 DIABETES MELLITUS WITH OTHER DIABETIC NEUROLOGICAL COMPLICATION (HCC): ICD-10-CM

## 2023-01-18 DIAGNOSIS — I48.20 CHRONIC ATRIAL FIBRILLATION (HCC): ICD-10-CM

## 2023-01-18 DIAGNOSIS — E66.01 SEVERE OBESITY (BMI 35.0-39.9) WITH COMORBIDITY (HCC): ICD-10-CM

## 2023-01-18 DIAGNOSIS — Z00.00 MEDICARE ANNUAL WELLNESS VISIT, SUBSEQUENT: ICD-10-CM

## 2023-01-18 DIAGNOSIS — I26.99 OTHER PULMONARY EMBOLISM WITHOUT ACUTE COR PULMONALE, UNSPECIFIED CHRONICITY (HCC): ICD-10-CM

## 2023-01-18 DIAGNOSIS — L98.491 SKIN ULCER, LIMITED TO BREAKDOWN OF SKIN (HCC): ICD-10-CM

## 2023-01-18 DIAGNOSIS — Z79.01 WARFARIN ANTICOAGULATION: Primary | ICD-10-CM

## 2023-01-18 DIAGNOSIS — Z99.2 DEPENDENCE ON RENAL DIALYSIS (HCC): ICD-10-CM

## 2023-01-18 DIAGNOSIS — R00.2 HEART PALPITATIONS: ICD-10-CM

## 2023-01-18 DIAGNOSIS — I25.118 ATHEROSCLEROTIC HEART DISEASE OF NATIVE CORONARY ARTERY WITH OTHER FORMS OF ANGINA PECTORIS (HCC): ICD-10-CM

## 2023-01-18 DIAGNOSIS — D68.9 COAGULATION DEFECT (HCC): ICD-10-CM

## 2023-01-18 DIAGNOSIS — F33.0 MILD EPISODE OF RECURRENT MAJOR DEPRESSIVE DISORDER (HCC): ICD-10-CM

## 2023-01-18 DIAGNOSIS — J18.9 PNEUMONIA OF BOTH LUNGS DUE TO INFECTIOUS ORGANISM, UNSPECIFIED PART OF LUNG: ICD-10-CM

## 2023-01-18 DIAGNOSIS — J84.9 INTERSTITIAL PULMONARY DISEASE, UNSPECIFIED (HCC): ICD-10-CM

## 2023-01-18 DIAGNOSIS — I50.30 DIASTOLIC CONGESTIVE HEART FAILURE, UNSPECIFIED HF CHRONICITY (HCC): ICD-10-CM

## 2023-01-18 LAB
INR BLD: 3
PT POC: 36.4 SECONDS
VALID INTERNAL CONTROL?: YES

## 2023-01-18 PROCEDURE — 1090F PRES/ABSN URINE INCON ASSESS: CPT | Performed by: FAMILY MEDICINE

## 2023-01-18 PROCEDURE — 3046F HEMOGLOBIN A1C LEVEL >9.0%: CPT | Performed by: FAMILY MEDICINE

## 2023-01-18 PROCEDURE — 3074F SYST BP LT 130 MM HG: CPT | Performed by: FAMILY MEDICINE

## 2023-01-18 PROCEDURE — 1123F ACP DISCUSS/DSCN MKR DOCD: CPT | Performed by: FAMILY MEDICINE

## 2023-01-18 PROCEDURE — 3078F DIAST BP <80 MM HG: CPT | Performed by: FAMILY MEDICINE

## 2023-01-18 PROCEDURE — G0439 PPPS, SUBSEQ VISIT: HCPCS | Performed by: FAMILY MEDICINE

## 2023-01-18 PROCEDURE — G8400 PT W/DXA NO RESULTS DOC: HCPCS | Performed by: FAMILY MEDICINE

## 2023-01-18 PROCEDURE — G8536 NO DOC ELDER MAL SCRN: HCPCS | Performed by: FAMILY MEDICINE

## 2023-01-18 PROCEDURE — 99214 OFFICE O/P EST MOD 30 MIN: CPT | Performed by: FAMILY MEDICINE

## 2023-01-18 PROCEDURE — G8417 CALC BMI ABV UP PARAM F/U: HCPCS | Performed by: FAMILY MEDICINE

## 2023-01-18 PROCEDURE — 3017F COLORECTAL CA SCREEN DOC REV: CPT | Performed by: FAMILY MEDICINE

## 2023-01-18 PROCEDURE — 1101F PT FALLS ASSESS-DOCD LE1/YR: CPT | Performed by: FAMILY MEDICINE

## 2023-01-18 PROCEDURE — G8510 SCR DEP NEG, NO PLAN REQD: HCPCS | Performed by: FAMILY MEDICINE

## 2023-01-18 PROCEDURE — 85610 PROTHROMBIN TIME: CPT | Performed by: FAMILY MEDICINE

## 2023-01-18 PROCEDURE — G8427 DOCREV CUR MEDS BY ELIG CLIN: HCPCS | Performed by: FAMILY MEDICINE

## 2023-01-18 PROCEDURE — G0463 HOSPITAL OUTPT CLINIC VISIT: HCPCS | Performed by: FAMILY MEDICINE

## 2023-01-18 PROCEDURE — 2022F DILAT RTA XM EVC RTNOPTHY: CPT | Performed by: FAMILY MEDICINE

## 2023-01-18 NOTE — PROGRESS NOTES
Chief Complaint   Patient presents with    Follow-up     INR      Patient presents in office today for INR check. States that her HR has been jumping. No other concerns. 1. Have you been to the ER, urgent care clinic since your last visit? Hospitalized since your last visit? Yes When: 12/29/22 Where: [facility] ED Reason for visit: pneumonia    2. Have you seen or consulted any other health care providers outside of the [address] [place] [address] [place] since your last visit? Include any pap smears or colon screening.  No    Learning Assessment 6/28/2019   PRIMARY LEARNER Patient   PRIMARY LANGUAGE ENGLISH   LEARNER PREFERENCE PRIMARY LISTENING   ANSWERED BY patient    RELATIONSHIP SELF

## 2023-01-18 NOTE — PATIENT INSTRUCTIONS
Medicare Wellness Visit, Female     The best way to live healthy is to have a lifestyle where you eat a well-balanced diet, exercise regularly, limit alcohol use, and quit all forms of tobacco/nicotine, if applicable. Regular preventive services are another way to keep healthy. Preventive services (vaccines, screening tests, monitoring & exams) can help personalize your care plan, which helps you manage your own care. Screening tests can find health problems at the earliest stages, when they are easiest to treat. [facility] follows the current, evidence-based guidelines published by the [facility] ([facility]) when recommending preventive services for our patients. Because we follow these guidelines, sometimes recommendations change over time as research supports it. (For example, mammograms used to be recommended annually. Even though Medicare will still pay for an annual mammogram, the newer guidelines recommend a mammogram every two years for women of average risk). Of course, you and your doctor may decide to screen more often for some diseases, based on your risk and your co-morbidities (chronic disease you are already diagnosed with). Preventive services for you include:  - Medicare offers their members a free annual wellness visit, which is time for you and your primary care provider to discuss and plan for your preventive service needs.  Take advantage of this benefit every year!    -Over the age of 72 should receive the recommended pneumonia vaccines.    -All adults should have a flu vaccine yearly.  -All adults should have a tetanus vaccine every 10 years.   -Over the age 48 should receive the shingles vaccines.        -All adults should be screened once for Hepatitis C.  -All adults age 38-68 who are overweight should have a diabetes screening test once every three years.   -Other screening tests and preventive services for persons with diabetes include: an eye exam to screen for diabetic retinopathy, a kidney function test, a foot exam, and stricter control over your cholesterol.   -Cardiovascular screening for adults with routine risk involves an electrocardiogram (ECG) at intervals determined by your doctor.     -Colorectal cancer screenings should be done for adults age 39-70 with no increased risk factors for colorectal cancer. There are a number of acceptable methods of screening for this type of cancer. Each test has its own benefits and drawbacks. Discuss with your doctor what is most appropriate for you during your annual wellness visit. The different tests include: colonoscopy (considered the best screening method), a fecal occult blood test, a fecal DNA test, and sigmoidoscopy.    -Lung cancer screening is recommended annually with a low dose CT scan for adults between age 54 and 68, who have smoked at least 30 pack years (equivalent of 1 pack per day for 30 days), and who is a current smoker or quit less than 15 years ago.    -A bone mass density test is recommended when a woman turns 65 to screen for osteoporosis. This test is only recommended one time, as a screening. Some providers will use this same test as a disease monitoring tool if you already have osteoporosis. -Breast cancer screenings are recommended every other year for women of normal risk, age 54-69.    -Cervical cancer screenings for women over age 72 are only recommended with certain risk factors.      Here is a list of your current Health Maintenance items (your personalized list of preventive services) with a due date:  Health Maintenance Due   Topic Date Due    Pneumococcal Vaccine (1 - PCV) Never done    Shingles Vaccine (1 of 2) Never done    DTaP/Tdap/Td  (1 - Tdap) Never done    Smoker or Former [address]  Never done    Eye Exam  02/28/2021    Diabetic Foot Care  09/15/2021    COVID-19 Vaccine (4 - Booster for Emanuel Kirstie series) 01/22/2022  Yearly Flu Vaccine (1) 08/01/2022    Mammogram  09/29/2022    Bone Mineral Density   Never done

## 2023-01-18 NOTE — PROGRESS NOTES
Progress Note    she is a 74y.o. year old female who presents for evalution. Subjective:     Pt here for INR check and she is currently at goal at 3.0. She will skip a 1 day to help keep it at goal.      She feels her HR jumping up at times. It will go up and last a few minutes then go back to normal.  She will make an appt with cardiology to see about a possible holter, she states it does feel irregular. Her pulse is regular currently. If she is having paroxysms of a fib then she is at least on coumadin. She will need a repeat cxr in a few weeks. She is on chronic oxygen but left in car today. Reviewed PmHx, RxHx, FmHx, SocHx, AllgHx and updated and dated in the chart. Review of Systems - negative except as listed above in the HPI    Objective:     Vitals:    01/18/23 1524   BP: 109/69   Pulse: 99   Resp: 16   Temp: 98.3 °F (36.8 °C)   TempSrc: Oral   SpO2: 91%   Weight: 263 lb (119.3 kg)   Height: 5' 10\" (1.778 m)       Current Outpatient Medications   Medication Sig    metoprolol succinate (TOPROL-XL) 50 mg XL tablet 25 mg.    sucralfate (CARAFATE) 1 gram tablet Take 1 tablet by mouth 4 times daily    warfarin (COUMADIN) 2.5 mg tablet Take 1 mg by mouth every Monday, Friday. Alternate with 2.5mg the other 5 days    folic acid (FOLVITE) 1 mg tablet Take 1 Tablet by mouth daily. [START ON 1/26/2023] oxyCODONE-acetaminophen (PERCOCET 10)  mg per tablet Take 1 Tablet by mouth every eight (8) hours as needed for Pain for up to 30 days. Max Daily Amount: 3 Tablets. midodrine (PROAMATINE) 2.5 mg tablet Take 1 tablet by mouth twice daily    potassium chloride (K-DUR, KLOR-CON M20) 20 mEq tablet Take 1 Tablet by mouth daily. isosorbide mononitrate ER (IMDUR) 30 mg tablet Take 1 Tablet by mouth daily. pantoprazole (PROTONIX) 40 mg tablet Take 1 Tablet by mouth Before breakfast and dinner.  Indications: gastritis    polyethylene glycol (MIRALAX) 17 gram packet Take 1 Packet by mouth daily as needed for Constipation. nitroglycerin (NITROSTAT) 0.4 mg SL tablet 1 Tablet by SubLINGual route every five (5) minutes as needed for Chest Pain. Up to 3 doses. ondansetron (ZOFRAN ODT) 4 mg disintegrating tablet Take 1 Tablet by mouth every eight (8) hours as needed for Nausea or Vomiting. atorvastatin (LIPITOR) 80 mg tablet TAKE 1 TABLET BY MOUTH ONCE DAILY NIGHTLY    allopurinoL (ZYLOPRIM) 100 mg tablet Take 1 tablet by mouth once daily    CALCITRIOL PO Take 0.5 mg by mouth. albuterol (PROAIR HFA) 90 mcg/actuation inhaler Take 2 Puffs by inhalation every four (4) hours as needed for Wheezing. Oxygen O2 2L NC 24/7    azithromycin (ZITHROMAX) 250 mg tablet Take 2 tablets today, then take 1 tablet daily (Patient not taking: No sig reported)    fluocinoLONE (SYNALAR) 0.01 % cream Apply  to affected area two (2) times a day. (Patient not taking: No sig reported)    oxyCODONE-acetaminophen (PERCOCET 10)  mg per tablet Take 1 Tablet by mouth every eight (8) hours as needed for Pain for up to 30 days. Max Daily Amount: 3 Tablets. (Patient not taking: No sig reported)    carvediloL (COREG) 3.125 mg tablet Take 1 Tablet by mouth two (2) times daily (with meals). (Patient not taking: No sig reported)    erythromycin (ERYC) 250 mg capsule Take 1 Capsule by mouth Before breakfast, lunch, and dinner. Indications: stomach muscle paralysis and decreased function (Patient not taking: No sig reported)    triamcinolone acetonide (KENALOG) 0.1 % ointment Apply  to affected area two (2) times a day. use thin layer (Patient not taking: No sig reported)     No current facility-administered medications for this visit.        Physical Examination: General appearance - alert, well appearing, and in no distress  Chest - clear to auscultation, no wheezes, rales or rhonchi, symmetric air entry  Heart - normal rate, regular rhythm, normal S1, S2, no murmurs, rubs, clicks or gallops      Assessment/ Plan: Diagnoses and all orders for this visit:    1. Warfarin anticoagulation  -     AMB POC PT/INR  Upper limit normal, hold one day and recheck in 4 weeks  2. Skin ulcer, limited to breakdown of skin ([address]    3. Other pulmonary embolism without acute cor pulmonale, unspecified chronicity (HCC)  Chronic coumadin  4. Mild episode of recurrent major depressive disorder (HCC)  stable  5. Dependence on renal dialysis [facility])  Follows with renal  6. Diastolic congestive heart failure, unspecified HF chronicity ([address]  Follows with cardiology  7. Type 2 diabetes mellitus with other diabetic neurological complication (HCC)  Last A1C was 4.3  8. Chronic atrial fibrillation (HCC)  Sees cardio  9. Interstitial pulmonary disease, unspecified ([facility]  Follows with pulm  10. Coagulation defect ([facility]    11. Atherosclerotic heart disease of native coronary artery with other forms of angina pectoris ([facility]  cardio  12. Heart palpitations  She will reach out to make appt with cardiology and see about holter monitor  13. Severe obesity (BMI 35.0-39. 9) with comorbidity ([facility]    14. Medicare annual wellness visit, subsequent    15. Pneumonia of both lungs due to infectious organism, unspecified part of lung  -     XR CHEST PA LAT; Future   Get done before next visit to check for pneumonia resolution. Pt awaiting better optimization for her knee surgery that was put on hold. Follow-up and Dispositions    Return in about 4 weeks (around 2/15/2023), or if symptoms worsen or fail to improve. I have discussed the diagnosis with the patient and the intended plan as seen in the above orders. The patient has received an after-visit summary and questions were answered concerning future plans. Pt conveyed understanding of plan.     Medication Side Effects and Warnings were discussed with patient    An electronic signature was used to authenticate this note  Phill Simon, DO      This is the Subsequent Medicare Annual Wellness Exam, performed 12 months or more after the Initial AWV or the last Subsequent AWV    I have reviewed the patient's medical history in detail and updated the computerized patient record. Assessment/Plan   Education and counseling provided:  Are appropriate based on today's review and evaluation    1. Warfarin anticoagulation  -     AMB POC PT/INR  2. Skin ulcer, limited to breakdown of skin ([address]  3. Other pulmonary embolism without acute cor pulmonale, unspecified chronicity ([address]  4. Mild episode of recurrent major depressive disorder ([address]  5. Dependence on renal dialysis ([address]  6. Diastolic congestive heart failure, unspecified HF chronicity ([address]  7. Type 2 diabetes mellitus with other diabetic neurological complication ([address]  8. Chronic atrial fibrillation (HCC)  9. Interstitial pulmonary disease, unspecified ([address]  10. Coagulation defect ([address]  11. Atherosclerotic heart disease of native coronary artery with other forms of angina pectoris ([address]  12. Heart palpitations  13. Severe obesity (BMI 35.0-39. 9) with comorbidity ([address]  14. Medicare annual wellness visit, subsequent  15. Pneumonia of both lungs due to infectious organism, unspecified part of lung  -     XR CHEST PA LAT;  Future       Depression Risk Factor Screening     3 most recent PHQ Screens 1/18/2023   Little interest or pleasure in doing things Not at all   Feeling down, depressed, irritable, or hopeless Not at all   Total Score PHQ 2 0   Trouble falling or staying asleep, or sleeping too much -   Feeling tired or having little energy -   Poor appetite, weight loss, or overeating -   Feeling bad about yourself - or that you are a failure or have let yourself or your family down -   Trouble concentrating on things such as school, work, reading, or watching TV -   Moving or speaking so slowly that other people could have noticed; or the opposite being so fidgety that others notice -   Thoughts of being better off dead, or hurting yourself in some way -   PHQ 9 Score -   How difficult have these problems made it for you to do your work, take care of your home and get along with others -       Alcohol & Drug Abuse Risk Screen    Do you average more than 1 drink per night or more than 7 drinks a week:  No    On any one occasion in the past three months have you have had more than 3 drinks containing alcohol:  No       Opioid Risk: (Low risk score <55, High risk score ?55)  Opioid risk score: 20      Click here to complete the Controlled Substance Monitoring SmartForm    Last PDMP Chance as Reviewed:  Review User Review Instant Review Result   FATOU HUNG 11/15/2022  4:04 PM Reviewed PDMP [1]         Functional Ability and Level of Safety    Hearing: Hearing is good. Activities of Daily Living: The home contains:  shower chair  Patient needs help with:  transportation, shopping, preparing meals, laundry, and housework      Ambulation:  in wheelchair/ scooter     Fall Risk:  Fall Risk Assessment, last 12 mths 12/27/2022   Able to walk? Yes   Fall in past 12 months? 0   Do you feel unsteady?  0   Are you worried about falling 0      Abuse Screen:  Patient is not abused       Cognitive Screening    Has your family/caregiver stated any concerns about your memory: no         Health Maintenance Due     Health Maintenance Due   Topic Date Due    Pneumococcal 65+ years (1 - PCV) Never done    Shingles Vaccine (1 of 2) Never done    DTaP/Tdap/Td series (1 - Tdap) Never done    Low dose CT lung screening  Never done    Eye Exam Retinal or Dilated  02/28/2021    Foot Exam Q1  09/15/2021    COVID-19 Vaccine (4 - Booster for Moderna series) 01/22/2022    Flu Vaccine (1) 08/01/2022    Breast Cancer Screen Mammogram  09/29/2022    Bone Densitometry (Dexa) Screening  Never done       Patient Care Team   Patient Care Team:  Bentley Peres DO as PCP - General (Family Medicine)  Bentley Peres DO as PCP - [facility] Empaneled Provider  Filippo Mosher MD (Endocrinology Physician)  Stewart Mcclure MD (Orthopedic Surgery)  Max Shannon MD as Consulting Provider (Cardiovascular Disease Physician)  Thomas Flores MD as Consulting Provider (Nephrology)  Goran Ayala DO as Consulting Provider (Pulmonary Disease)  Ariana Snyder MD as Consulting Provider (Vascular Surgery)  Evan Trevizo MD as Physician (Cardiovascular Disease Physician)    History     Patient Active Problem List   Diagnosis Code    Atherosclerotic heart disease of native coronary artery with other forms of angina pectoris ([facility] I25.118    JASEN on CPAP G47.33, Z99.89    Pulmonary hypertension ([facility] I27.20    HTN (hypertension) I10    Morbid obesity E66.01    Normocytic anemia D64.9    DM (diabetes mellitus), type 2 with renal complications ([facility] P67.69    ILD (interstitial lung disease) ([facility] J84.9    Warfarin anticoagulation Z79.01    S/P left pulmonary artery pressure sensor implant placement Z95.9    Hx of deep venous thrombosis Z86.718    Sleep apnea G47.30    Diabetic gastroparesis (HCC) E11.43, K31.84    GERD (gastroesophageal reflux disease) K21.9    DM type 2 causing neurological disease ([facility] E11.49    DM type 2 causing vascular disease ([facility] E11.59    Gout M10.9    Limited mobility Z74.09    ESRD on peritoneal dialysis ([facility] N18.6, Z99.2    (HFpEF) heart failure with preserved ejection fraction (HCC) I50.30    Nephrolithiasis N20.0    PVC (premature ventricular contraction) I49.3    S/P ORIF (open reduction internal fixation) fracture Z98.890, Z87.81    Coronary stent patent Z95.5    Edema of both lower extremities R60.0    Hypokalemia E87.6    Chest pain R07.9    Abdominal pain R10.9    Ascites R18.8    Intractable vomiting with nausea R11.2    Hyponatremia E87.1    Supratherapeutic INR R79.1    Chronic atrial fibrillation (HCC) I48.20    Coagulation defect ([facility]) D68.9    Skin ulcer, limited to breakdown of skin ([facility] L98.491    Other pulmonary embolism without acute cor pulmonale, unspecified chronicity ([facility]) I26.99    Mild episode of recurrent major depressive disorder ([address] F33.0     Past Medical History:   Diagnosis Date     Sleep Apnea 2/16/2011    Compliant with c-pap. Aortic aneurysm ([facility])     Abdominal    CAD (coronary Artery Disease) Native Artery 2/16/2011    Chronic kidney disease     Hemodiaylsis  3x/week    Chronic pain     CKD (chronic kidney disease) stage 4, GFR 15-29 ml/min ([facility]) 2/10/2017    Diabetic gastroparesis ([facility])     Diastolic heart failure ([address] 10/5/2012    DM type 2 causing neurological disease ([address]     DM type 2 causing renal disease (HCC)     Insulin SS at night only PRN    DM type 2 causing vascular disease ([address]     Esophageal stricture 2012    dialted Dr. Rubio Delacruz    G6PD deficiency     trait    GERD (gastroesophageal reflux disease)     Gout     Heart failure ([address]     Hemodialysis access, AV graft ([address] 04/02/2017    Dialysis MWF    [address]      Hypertension     ILD (interstitial lung disease) ([address]     open lung bx CJW 2010    Infected dental caries 3/17/2020    Infection of total right knee replacement ([address] 3/17/2020    Loss of appetite 3/17/2020    Morbid obesity ([address]     OA (osteoarthritis)     Ovarian cancer ([address]     cervical and uterine    Rheumatoid arteritis ([facility])     S/P left pulmonary artery pressure sensor implant placement 8/31/2017    Cardiomems     Thromboembolus ([address]     Right calf     Tobacco use disorder 3/21/2012    Uterine cervix cancer ([address]     Vitamin D deficiency 8/9/2013      Past Surgical History:   Procedure Laterality Date    COLONOSCOPY N/A 6/24/2016    COLONOSCOPY performed by Svitlana Parham MD at [address] [facility]    HX ADENOIDECTOMY      HX APPENDECTOMY      HX [address]      bilateral    HX CHOLECYSTECTOMY      HX HEART CATHETERIZATION      x 7    HX HERNIA REPAIR      HX HYSTERECTOMY      HX ORTHOPAEDIC Right 11/12/12    right knee replacement    HX ORTHOPAEDIC Bilateral     carpal tunnel repair HX ORTHOPAEDIC Right     PLATE IN ANKLE DUE TO FRACTURE    HX SALPINGO-OOPHORECTOMY      HX TONSIL AND ADENOIDECTOMY      HX TONSILLECTOMY      HX VASCULAR ACCESS Left     Left lower arm AV fistula    IR INSERT PERITONEAL VENOUS SHUNT      SD UNLISTED PROCEDURE CARDIAC SURGERY  02/2019    x4 with last sttent placed 2/2019. SD UNLISTED PROCEDURE LUNGS & PLEURA Right     > 20 years ago  RLL removed     UPPER GI ENDOSCOPY,VINOD W GUIDE  6/24/2016          Current Outpatient Medications   Medication Sig Dispense Refill    metoprolol succinate (TOPROL-XL) 50 mg XL tablet 25 mg.      sucralfate (CARAFATE) 1 gram tablet Take 1 tablet by mouth 4 times daily 120 Tablet 5    warfarin (COUMADIN) 2.5 mg tablet Take 1 mg by mouth every Monday, Friday. Alternate with 2.5mg the other 5 days 45 Tablet 1    folic acid (FOLVITE) 1 mg tablet Take 1 Tablet by mouth daily. 90 Tablet 3    [START ON 1/26/2023] oxyCODONE-acetaminophen (PERCOCET 10)  mg per tablet Take 1 Tablet by mouth every eight (8) hours as needed for Pain for up to 30 days. Max Daily Amount: 3 Tablets. 90 Tablet 0    midodrine (PROAMATINE) 2.5 mg tablet Take 1 tablet by mouth twice daily 180 Tablet 3    potassium chloride (K-DUR, KLOR-CON M20) 20 mEq tablet Take 1 Tablet by mouth daily. 30 Tablet 2    isosorbide mononitrate ER (IMDUR) 30 mg tablet Take 1 Tablet by mouth daily. 30 Tablet 1    pantoprazole (PROTONIX) 40 mg tablet Take 1 Tablet by mouth Before breakfast and dinner. Indications: gastritis 180 Tablet 0    polyethylene glycol (MIRALAX) 17 gram packet Take 1 Packet by mouth daily as needed for Constipation. nitroglycerin (NITROSTAT) 0.4 mg SL tablet 1 Tablet by SubLINGual route every five (5) minutes as needed for Chest Pain. Up to 3 doses. 1 Each 1    ondansetron (ZOFRAN ODT) 4 mg disintegrating tablet Take 1 Tablet by mouth every eight (8) hours as needed for Nausea or Vomiting.  30 Tablet 0    atorvastatin (LIPITOR) 80 mg tablet TAKE 1 TABLET BY MOUTH ONCE DAILY NIGHTLY 90 Tablet 3    allopurinoL (ZYLOPRIM) 100 mg tablet Take 1 tablet by mouth once daily 90 Tablet 3    CALCITRIOL PO Take 0.5 mg by mouth. albuterol (PROAIR HFA) 90 mcg/actuation inhaler Take 2 Puffs by inhalation every four (4) hours as needed for Wheezing. 1 Inhaler 5    Oxygen O2 2L NC 24/7      azithromycin (ZITHROMAX) 250 mg tablet Take 2 tablets today, then take 1 tablet daily (Patient not taking: No sig reported) 6 Tablet 0    fluocinoLONE (SYNALAR) 0.01 % cream Apply  to affected area two (2) times a day. (Patient not taking: No sig reported) 30 g 0    oxyCODONE-acetaminophen (PERCOCET 10)  mg per tablet Take 1 Tablet by mouth every eight (8) hours as needed for Pain for up to 30 days. Max Daily Amount: 3 Tablets. (Patient not taking: No sig reported) 90 Tablet 0    carvediloL (COREG) 3.125 mg tablet Take 1 Tablet by mouth two (2) times daily (with meals). (Patient not taking: No sig reported) 60 Tablet 1    erythromycin (ERYC) 250 mg capsule Take 1 Capsule by mouth Before breakfast, lunch, and dinner. Indications: stomach muscle paralysis and decreased function (Patient not taking: No sig reported) 90 Capsule 0    triamcinolone acetonide (KENALOG) 0.1 % ointment Apply  to affected area two (2) times a day.  use thin layer (Patient not taking: No sig reported) 30 g 1     Allergies   Allergen Reactions    Contrast Dye [Iodine] Anaphylaxis     Tolerates when pre medicated w/ IV solumedrol and benadryl prior to procedure     Shellfish Derived Anaphylaxis    Levaquin [Levofloxacin] Nausea and Vomiting    Morphine Hives and Itching    Nsaids (Non-Steroidal Anti-Inflammatory Drug) Nausea and Vomiting       Family History   Problem Relation Age of Onset    Heart Disease Mother     No Known Problems Daughter     No Known Problems Son     No Known Problems Son     Anesth Problems Neg Hx      Social History     Tobacco Use    Smoking status: Former     Packs/day: 0.50     Years: 41.00     Pack years: 20.50     Types: Cigarettes     Quit date: 2014     Years since quittin.1    Smokeless tobacco: Never   Substance Use Topics    Alcohol use: No         Hallie Levine DO

## 2023-01-25 ENCOUNTER — HOSPITAL ENCOUNTER (EMERGENCY)
Age: 68
Discharge: HOME | End: 2023-01-25
Attending: STUDENT IN AN ORGANIZED HEALTH CARE EDUCATION/TRAINING PROGRAM
Payer: MEDICARE

## 2023-01-25 ENCOUNTER — APPOINTMENT (OUTPATIENT)
Dept: CT IMAGING | Age: 68
End: 2023-01-25
Attending: STUDENT IN AN ORGANIZED HEALTH CARE EDUCATION/TRAINING PROGRAM
Payer: MEDICARE

## 2023-01-25 VITALS
WEIGHT: 268 LBS | HEART RATE: 74 BPM | OXYGEN SATURATION: 98 % | SYSTOLIC BLOOD PRESSURE: 156 MMHG | DIASTOLIC BLOOD PRESSURE: 95 MMHG | RESPIRATION RATE: 14 BRPM | BODY MASS INDEX: 38.37 KG/M2 | TEMPERATURE: 98.2 F | HEIGHT: 70 IN

## 2023-01-25 DIAGNOSIS — R10.9 ABDOMINAL PAIN, UNSPECIFIED ABDOMINAL LOCATION: Primary | ICD-10-CM

## 2023-01-25 LAB
ALBUMIN SERPL-MCNC: 2.7 G/DL (ref 3.5–5.2)
ALBUMIN/GLOB SERPL: 0.9 (ref 1.1–2.2)
ALP SERPL-CCNC: 112 U/L (ref 35–104)
ALT SERPL-CCNC: 6 U/L (ref 10–35)
ANION GAP SERPL CALC-SCNC: 14 MMOL/L (ref 5–15)
AST SERPL-CCNC: 18 U/L (ref 10–35)
BASOPHILS # BLD: 0 K/UL (ref 0–1)
BASOPHILS NFR BLD: 0 % (ref 0–1)
BILIRUB SERPL-MCNC: 0.3 MG/DL (ref 0.2–1)
BUN SERPL-MCNC: 28 MG/DL (ref 8–23)
BUN/CREAT SERPL: 4 (ref 12–20)
CALCIUM SERPL-MCNC: 8.2 MG/DL (ref 8.8–10.2)
CHLORIDE SERPL-SCNC: 100 MMOL/L (ref 98–107)
CO2 SERPL-SCNC: 23 MMOL/L (ref 22–29)
CREAT SERPL-MCNC: 7.68 MG/DL (ref 0.5–0.9)
DIFFERENTIAL METHOD BLD: ABNORMAL
EOSINOPHIL # BLD: 0.7 K/UL (ref 0–0.4)
EOSINOPHIL NFR BLD: 6 %
ERYTHROCYTE [DISTWIDTH] IN BLOOD BY AUTOMATED COUNT: 18.3 % (ref 11.5–14.5)
GLOBULIN SER CALC-MCNC: 3.1 G/DL (ref 2–4)
GLUCOSE SERPL-MCNC: 89 MG/DL (ref 65–100)
HCT VFR BLD AUTO: 23.8 % (ref 35–47)
HGB BLD-MCNC: 7.4 G/DL (ref 11.5–16)
IMM GRANULOCYTES # BLD AUTO: 0.1 K/UL (ref 0–0.04)
IMM GRANULOCYTES NFR BLD AUTO: 1 % (ref 0–0.5)
LIPASE SERPL-CCNC: 23 U/L (ref 13–60)
LYMPHOCYTES # BLD: 2.6 K/UL (ref 0.8–3.5)
LYMPHOCYTES NFR BLD: 24 % (ref 12–49)
MCH RBC QN AUTO: 31.9 PG (ref 26–34)
MCHC RBC AUTO-ENTMCNC: 31.1 G/DL (ref 30–36.5)
MCV RBC AUTO: 102.6 FL (ref 80–99)
MONOCYTES # BLD: 0.7 K/UL (ref 0–1)
MONOCYTES NFR BLD: 6 % (ref 5–13)
NEUTS SEG # BLD: 6.8 K/UL (ref 1.8–8)
NEUTS SEG NFR BLD: 63 % (ref 32–75)
NRBC # BLD: 0.02 K/UL (ref 0–0.01)
NRBC BLD-RTO: 0.2 PER 100 WBC
PLATELET # BLD AUTO: 235 K/UL (ref 150–400)
PMV BLD AUTO: 9.9 FL (ref 8.9–12.9)
POTASSIUM SERPL-SCNC: 4.1 MMOL/L (ref 3.5–5.1)
PROT SERPL-MCNC: 5.8 G/DL (ref 6.4–8.3)
RBC # BLD AUTO: 2.32 M/UL (ref 3.8–5.2)
SODIUM SERPL-SCNC: 137 MMOL/L (ref 136–145)
WBC # BLD AUTO: 10.8 K/UL (ref 3.6–11)

## 2023-01-25 PROCEDURE — 36415 COLL VENOUS BLD VENIPUNCTURE: CPT

## 2023-01-25 PROCEDURE — 83690 ASSAY OF LIPASE: CPT

## 2023-01-25 PROCEDURE — 99284 EMERGENCY DEPT VISIT MOD MDM: CPT

## 2023-01-25 PROCEDURE — 80053 COMPREHEN METABOLIC PANEL: CPT

## 2023-01-25 PROCEDURE — 85025 COMPLETE CBC W/AUTO DIFF WBC: CPT

## 2023-01-25 PROCEDURE — 74176 CT ABD & PELVIS W/O CONTRAST: CPT

## 2023-01-25 RX ORDER — ACETAMINOPHEN 325 MG/1
650 TABLET ORAL ONCE
Status: DISCONTINUED | OUTPATIENT
Start: 2023-01-25 | End: 2023-01-26 | Stop reason: HOSPADM

## 2023-01-25 NOTE — ED TRIAGE NOTES
Pt reports to ED w/ cc of severe anal pain. Pt states she has not have a BM in 6 days and her hemmeroids feel swollen.  Pain is 10/10

## 2023-01-25 NOTE — ED NOTES
Bedside and Verbal shift change report given to [address]  (oncoming nurse) by Mont Gilford RN (offgoing nurse). Report included the following information SBAR.

## 2023-01-26 NOTE — ED NOTES
7 PM  Change of shift. Care of patient taken over from Dr. Elena Nichole; H&P reviewed, bedside handoff complete. Awaiting CT. CT negative. Discussed my clinical impression(s), any labs and/or radiology results with the patient. I answered any questions and addressed any concerns. Discussed the importance of following up with their primary care physician and/or specialist(s). Discussed signs or symptoms that would warrant return back to the ER for further evaluation. The patient is agreeable with discharge.

## 2023-01-26 NOTE — ED NOTES
Pt given discharge instructions, patient education, 0 prescriptions and follow-up information. Pt verbalizing understanding. All questions answered. Pt discharge to home in private vehicle, ambulatory. Pt A&Ox4, RA, pain controlled.

## 2023-01-26 NOTE — ED PROVIDER NOTES
HPI     Date of Service:  1/25/2023    Patient:  Shabbir Og    Chief Complaint:  Constipation       HPI:  Shabbir Og is a 74 y.o.  female with a past medical history of ESRD on PD, CAD, DM, GERD who presents for evaluation of constipation. Patient reports last bowel movement 6 days ago. States she has history of constipation has been taking her MiraLAX, senna and 2 different Dulcolax is without relief of symptoms. States today she began feeling nauseous but no vomiting. She is also feeling diffuse abdominal discomfort which she describes as distention and fullness. She has had multiple prior abdominal surgeries including appendectomy, cholecystectomy, hernia repair, hysterectomy. Denies any history of bowel obstruction. No other recent illness. Past Medical History:   Diagnosis Date     Sleep Apnea 2/16/2011    Compliant with c-pap. Aortic aneurysm (HCC)     Abdominal    CAD (coronary Artery Disease) Native Artery 2/16/2011    Chronic kidney disease     Hemodiaylsis  3x/week    Chronic pain     CKD (chronic kidney disease) stage 4, GFR 15-29 ml/min ([facility]) 2/10/2017    Diabetic gastroparesis (HCC)     Diastolic heart failure ([address] 10/5/2012    DM type 2 causing neurological disease ([address]     DM type 2 causing renal disease (HCC)     Insulin SS at night only PRN    DM type 2 causing vascular disease ([address]     Esophageal stricture 2012    dialted Dr. Demarco Yee    G6PD deficiency     trait    GERD (gastroesophageal reflux disease)     Gout     Heart failure ([address]     Hemodialysis access, AV graft ([address] 04/02/2017    Dialysis MWF    [address] [facility]      Hypertension     ILD (interstitial lung disease) ([address]     open lung bx CJW 2010    Infected dental caries 3/17/2020    Infection of total right knee replacement ([address] 3/17/2020    Loss of appetite 3/17/2020    Morbid obesity ([address]     OA (osteoarthritis)     Ovarian cancer ([address]     cervical and uterine    Rheumatoid arteritis ([address] S/P left pulmonary artery pressure sensor implant placement 2017    Cardiomems     Thromboembolus ([facility]     Right calf     Tobacco use disorder 3/21/2012    Uterine cervix cancer ([facility]     Vitamin D deficiency 2013       Past Surgical History:   Procedure Laterality Date    COLONOSCOPY N/A 2016    COLONOSCOPY performed by Loyda Blankenship MD at [facility]      HX APPENDECTOMY      HX [address]      bilateral    HX CHOLECYSTECTOMY      HX HEART CATHETERIZATION      x 7    HX HERNIA REPAIR      HX HYSTERECTOMY      HX ORTHOPAEDIC Right 12    right knee replacement    HX ORTHOPAEDIC Bilateral     carpal tunnel repair    HX ORTHOPAEDIC Right     PLATE IN ANKLE DUE TO FRACTURE    HX SALPINGO-OOPHORECTOMY      HX TONSIL AND ADENOIDECTOMY      HX TONSILLECTOMY      HX VASCULAR ACCESS Left     Left lower arm AV fistula    IR INSERT PERITONEAL VENOUS SHUNT      SD UNLISTED PROCEDURE CARDIAC SURGERY  02/2019    x4 with last sttent placed 2019.     SD UNLISTED PROCEDURE LUNGS & PLEURA Right     > 20 years ago  RLL removed     UPPER GI ENDOSCOPY,DILATN W GUIDE  2016              Family History:   Problem Relation Age of Onset    Heart Disease Mother     No Known Problems Daughter     No Known Problems Son     No Known Problems Son     Anesth Problems Neg Hx        Social History     Socioeconomic History    Marital status:      Spouse name: Not on file    Number of children: Not on file    Years of education: Not on file    Highest education level: Not on file   Occupational History    Not on file   Tobacco Use    Smoking status: Former     Packs/day: 0.50     Years: 41.00     Pack years: 20.50     Types: Cigarettes     Quit date: 2014     Years since quittin.2    Smokeless tobacco: Never   Vaping Use    Vaping Use: Never used   Substance and Sexual Activity    Alcohol use: No    Drug use: No    Sexual activity: Not Currently   Other Topics Concern Not on file   Social History Narrative    Not on file     Social Determinants of Health     Financial Resource Strain: Low Risk     Difficulty of Paying Living Expenses: Not hard at all   Food Insecurity: No Food Insecurity    Worried About Running Out of Food in the Last Year: Never true    Ran Out of Food in the Last Year: Never true   Transportation Needs: Not on file   Physical Activity: Not on file   Stress: Not on file   Social Connections: Not on file   Intimate Partner Violence: Not on file   Housing Stability: Not on file         ALLERGIES: Contrast dye [iodine], Shellfish derived, Levaquin [levofloxacin], Morphine, and Nsaids (non-steroidal anti-inflammatory drug)    Review of Systems   Constitutional:  Negative for chills and fever. HENT:  Negative for congestion and rhinorrhea. Eyes:  Negative for discharge and redness. Respiratory:  Positive for shortness of breath. Negative for cough. Cardiovascular:  Negative for chest pain. Gastrointestinal:  Positive for abdominal pain, constipation, nausea and rectal pain. Negative for anal bleeding, blood in stool, diarrhea and vomiting. Skin:  Positive for wound. Neurological:  Negative for speech difficulty. Psychiatric/Behavioral:  Negative for agitation and confusion. Vitals:    01/25/23 1725 01/25/23 1800   BP: (!) 144/80    Pulse: 100    Resp: 17    Temp: 99.3 °F (37.4 °C)    SpO2: 100% 100%   Weight: 121.6 kg (268 lb)    Height: 5' 10\" (1.778 m)             Physical Exam  Vitals and nursing note reviewed. Constitutional:       General: She is in acute distress. Appearance: Normal appearance. She is not toxic-appearing. HENT:      Head: Normocephalic and atraumatic. Eyes:      General: No scleral icterus. Conjunctiva/sclera: Conjunctivae normal.   Cardiovascular:      Rate and Rhythm: Normal rate. Pulses: Normal pulses. Pulmonary:      Effort: Pulmonary effort is normal. No respiratory distress.    Abdominal: General: Abdomen is protuberant. Palpations: Abdomen is soft. Tenderness: There is generalized abdominal tenderness. There is no guarding or rebound. Genitourinary:     Rectum: Internal hemorrhoid present. No external hemorrhoid. Comments: No appreciated stool in the rectal vault   Musculoskeletal:         General: Normal range of motion. Skin:     General: Skin is warm and dry. Capillary Refill: Capillary refill takes less than 2 seconds. Neurological:      General: No focal deficit present. Mental Status: She is alert and oriented to person, place, and time. Psychiatric:         Mood and Affect: Mood normal.         Behavior: Behavior normal.        Medical Decision Making      DECISION MAKING:  Tonio Farmer is a 74 y.o. female who comes in as above. On arrival patient is afebrile and vital signs are stable. On my examination she is chronically ill-appearing and in mild distress. Her abdomen is obese and diffusely tender but no guarding or rebound. On rectal examination, she does have a stage I decubitus over the left gluteus. On rectal examination I did not appreciate any external hemorrhoids, there was an internal hemorrhoid palpated but no appreciated stool in the rectal vault. Differential diagnosis includes, but not limited to, bowel obstruction, constipation, electrolyte abnormality. 7:15 PM  Change of shift. Care of patient signed over to Dr. Maggie Malloy pending completion of labs and CT of the abd/pelvis. Amount and/or Complexity of Data Reviewed  Labs: ordered. Radiology: ordered. Risk  OTC drugs.            Procedures      Vanessa Foss,

## 2023-02-06 ENCOUNTER — TELEPHONE (OUTPATIENT)
Dept: FAMILY MEDICINE CLINIC | Age: 68
End: 2023-02-06

## 2023-02-09 ENCOUNTER — HOSPITAL ENCOUNTER (OUTPATIENT)
Dept: GENERAL RADIOLOGY | Age: 68
Discharge: HOME | End: 2023-02-09
Payer: MEDICARE

## 2023-02-09 ENCOUNTER — TRANSCRIBE ORDER (OUTPATIENT)
Dept: EMERGENCY DEPT | Age: 68
End: 2023-02-09

## 2023-02-09 DIAGNOSIS — R91.8 ABNORMALITY OF LUNG ON CHEST X-RAY: Primary | ICD-10-CM

## 2023-02-09 DIAGNOSIS — J18.9 PNEUMONIA OF BOTH LUNGS DUE TO INFECTIOUS ORGANISM, UNSPECIFIED PART OF LUNG: ICD-10-CM

## 2023-02-09 PROCEDURE — 71046 X-RAY EXAM CHEST 2 VIEWS: CPT

## 2023-02-09 NOTE — PROGRESS NOTES
The airspace disease is not clearing up. Therefore you need a CT scan of the chest.  I have already placed the order. Please call [phone] to schedule.

## 2023-02-15 ENCOUNTER — HOSPITAL ENCOUNTER (OUTPATIENT)
Dept: CT IMAGING | Age: 68
Discharge: HOME | End: 2023-02-15
Attending: FAMILY MEDICINE
Payer: MEDICARE

## 2023-02-15 DIAGNOSIS — R91.8 ABNORMALITY OF LUNG ON CHEST X-RAY: ICD-10-CM

## 2023-02-15 PROCEDURE — 71250 CT THORAX DX C-: CPT

## 2023-02-16 NOTE — PROGRESS NOTES
Her CT scan shows worsening fibrosis. She needs to make an appointment with pulmonary associates her pulmonologist and supply them with a copy of this report. She will be in the office tomorrow for an appointment.

## 2023-02-17 ENCOUNTER — OFFICE VISIT (OUTPATIENT)
Dept: FAMILY MEDICINE CLINIC | Age: 68
End: 2023-02-17
Payer: MEDICARE

## 2023-02-17 VITALS
RESPIRATION RATE: 16 BRPM | HEIGHT: 70 IN | TEMPERATURE: 98 F | DIASTOLIC BLOOD PRESSURE: 57 MMHG | BODY MASS INDEX: 38.37 KG/M2 | HEART RATE: 77 BPM | SYSTOLIC BLOOD PRESSURE: 117 MMHG | WEIGHT: 268 LBS | OXYGEN SATURATION: 98 %

## 2023-02-17 DIAGNOSIS — I26.99 OTHER PULMONARY EMBOLISM WITHOUT ACUTE COR PULMONALE, UNSPECIFIED CHRONICITY (HCC): ICD-10-CM

## 2023-02-17 DIAGNOSIS — M54.50 CHRONIC BILATERAL LOW BACK PAIN, UNSPECIFIED WHETHER SCIATICA PRESENT: ICD-10-CM

## 2023-02-17 DIAGNOSIS — M25.561 CHRONIC PAIN OF RIGHT KNEE: ICD-10-CM

## 2023-02-17 DIAGNOSIS — Z79.01 WARFARIN ANTICOAGULATION: Primary | ICD-10-CM

## 2023-02-17 DIAGNOSIS — R10.13 EPIGASTRIC PAIN: ICD-10-CM

## 2023-02-17 DIAGNOSIS — J84.10 PULMONARY FIBROSIS (HCC): ICD-10-CM

## 2023-02-17 DIAGNOSIS — G89.29 CHRONIC CHEST PAIN: ICD-10-CM

## 2023-02-17 DIAGNOSIS — G89.29 CHRONIC PAIN OF RIGHT KNEE: ICD-10-CM

## 2023-02-17 DIAGNOSIS — G89.29 CHRONIC BILATERAL LOW BACK PAIN, UNSPECIFIED WHETHER SCIATICA PRESENT: ICD-10-CM

## 2023-02-17 DIAGNOSIS — R07.9 CHRONIC CHEST PAIN: ICD-10-CM

## 2023-02-17 LAB
INR BLD: 5.4
PT POC: 64.6 SECONDS
VALID INTERNAL CONTROL?: YES

## 2023-02-17 RX ORDER — WARFARIN 1 MG/1
TABLET ORAL
Qty: 20 TABLET | Refills: 1 | Status: SHIPPED | OUTPATIENT
Start: 2023-02-17

## 2023-02-17 RX ORDER — OXYCODONE AND ACETAMINOPHEN 10; 325 MG/1; MG/1
1 TABLET ORAL
Qty: 90 TABLET | Refills: 0 | Status: SHIPPED | OUTPATIENT
Start: 2023-04-24 | End: 2023-05-24

## 2023-02-17 RX ORDER — OXYCODONE AND ACETAMINOPHEN 10; 325 MG/1; MG/1
1 TABLET ORAL
Qty: 90 TABLET | Refills: 0 | Status: SHIPPED | OUTPATIENT
Start: 2023-02-25 | End: 2023-03-27

## 2023-02-17 RX ORDER — OXYCODONE AND ACETAMINOPHEN 10; 325 MG/1; MG/1
1 TABLET ORAL
Qty: 90 TABLET | Refills: 0 | Status: SHIPPED | OUTPATIENT
Start: 2023-03-26 | End: 2023-04-25

## 2023-02-17 RX ORDER — WARFARIN 2.5 MG/1
TABLET ORAL
Qty: 80 TABLET | Refills: 1 | Status: SHIPPED | OUTPATIENT
Start: 2023-02-17

## 2023-02-17 NOTE — PATIENT INSTRUCTIONS

## 2023-02-17 NOTE — PROGRESS NOTES
Chief Complaint   Patient presents with    Follow-up     INR     Patient presents in office today for INR check. Has c/o upper abdominal pain for a couple of weeks. States that she was supposed to see GI yesterday but they called her the day before her apt and said they needed to switch to virtual and couldn't r/s her until June. No other concerns. 1. Have you been to the ER, urgent care clinic since your last visit? Hospitalized since your last visit? No    2. Have you seen or consulted any other health care providers outside of the [address] [place] [address] since your last visit? Include any pap smears or colon screening.  No    Learning Assessment 6/28/2019   PRIMARY LEARNER Patient   PRIMARY LANGUAGE ENGLISH   LEARNER PREFERENCE PRIMARY LISTENING   ANSWERED BY patient    RELATIONSHIP SELF

## 2023-02-17 NOTE — PROGRESS NOTES
Progress Note    she is a 74y.o. year old female who presents for evalution. Subjective:     Pt here for follow up and she had worsening fibrosis on her CT scan, saw Pulm this AM and is going to get more testing before will be approved for surgery. Pt also with GI pain and was supposed to see them yesterday and appt was cancelled. She will contact [facility] as she is requesting a new group. Her main reason for beg here is for INR check and is elevated at 5.4. States no changes and she is eating less than usual which may be contributing. Due for refills of percocet for chronic chest and arthritis pain. Helps a significant amount but she still remains in constant pain. No hx abuse or misuse.  checked and appropriate. No ind for naloxone. Reviewed PmHx, RxHx, FmHx, SocHx, AllgHx and updated and dated in the chart. Review of Systems - negative except as listed above in the HPI    Objective:     Vitals:    02/17/23 1505   BP: (!) 117/57   Pulse: 77   Resp: 16   Temp: 98 °F (36.7 °C)   TempSrc: Oral   SpO2: 98%   Weight: 268 lb (121.6 kg)   Height: 5' 10\" (1.778 m)       Current Outpatient Medications   Medication Sig    warfarin (COUMADIN) 2.5 mg tablet 2.5mg all days except Monday and Friday take 1mg    warfarin (COUMADIN) 1 mg tablet 1 tab PO Monday and Friday, 2.5 all other days. [START ON 4/24/2023] oxyCODONE-acetaminophen (PERCOCET 10)  mg per tablet Take 1 Tablet by mouth every eight (8) hours as needed for Pain for up to 30 days. Max Daily Amount: 3 Tablets. [START ON 3/26/2023] oxyCODONE-acetaminophen (PERCOCET 10)  mg per tablet Take 1 Tablet by mouth every eight (8) hours as needed for Pain for up to 30 days. Max Daily Amount: 3 Tablets. [START ON 2/25/2023] oxyCODONE-acetaminophen (PERCOCET 10)  mg per tablet Take 1 Tablet by mouth every eight (8) hours as needed for Pain for up to 30 days. Max Daily Amount: 3 Tablets.     metoprolol succinate (TOPROL-XL) 50 mg XL tablet 25 mg.    sucralfate (CARAFATE) 1 gram tablet Take 1 tablet by mouth 4 times daily    folic acid (FOLVITE) 1 mg tablet Take 1 Tablet by mouth daily. midodrine (PROAMATINE) 2.5 mg tablet Take 1 tablet by mouth twice daily    potassium chloride (K-DUR, KLOR-CON M20) 20 mEq tablet Take 1 Tablet by mouth daily. isosorbide mononitrate ER (IMDUR) 30 mg tablet Take 1 Tablet by mouth daily. pantoprazole (PROTONIX) 40 mg tablet Take 1 Tablet by mouth Before breakfast and dinner. Indications: gastritis    polyethylene glycol (MIRALAX) 17 gram packet Take 1 Packet by mouth daily as needed for Constipation. nitroglycerin (NITROSTAT) 0.4 mg SL tablet 1 Tablet by SubLINGual route every five (5) minutes as needed for Chest Pain. Up to 3 doses. ondansetron (ZOFRAN ODT) 4 mg disintegrating tablet Take 1 Tablet by mouth every eight (8) hours as needed for Nausea or Vomiting. atorvastatin (LIPITOR) 80 mg tablet TAKE 1 TABLET BY MOUTH ONCE DAILY NIGHTLY    allopurinoL (ZYLOPRIM) 100 mg tablet Take 1 tablet by mouth once daily    CALCITRIOL PO Take 0.5 mg by mouth. albuterol (PROAIR HFA) 90 mcg/actuation inhaler Take 2 Puffs by inhalation every four (4) hours as needed for Wheezing. Oxygen O2 2L NC 24/7    azithromycin (ZITHROMAX) 250 mg tablet Take 2 tablets today, then take 1 tablet daily (Patient not taking: No sig reported)    fluocinoLONE (SYNALAR) 0.01 % cream Apply  to affected area two (2) times a day. (Patient not taking: No sig reported)    carvediloL (COREG) 3.125 mg tablet Take 1 Tablet by mouth two (2) times daily (with meals). (Patient not taking: No sig reported)    erythromycin (ERYC) 250 mg capsule Take 1 Capsule by mouth Before breakfast, lunch, and dinner. Indications: stomach muscle paralysis and decreased function (Patient not taking: No sig reported)    triamcinolone acetonide (KENALOG) 0.1 % ointment Apply  to affected area two (2) times a day.  use thin layer (Patient not taking: No sig reported)     No current facility-administered medications for this visit. Physical Examination: General appearance - alert, well appearing, and in no distress      Diagnoses and all orders for this visit:    1. Warfarin anticoagulation  -     AMB POC PT/INR  -     warfarin (COUMADIN) 2.5 mg tablet; 2.5mg all days except Monday and Friday take 1mg  -     warfarin (COUMADIN) 1 mg tablet; 1 tab PO Monday and Friday, 2.5 all other days. Hold 3 days then restart new dosing f/up 2 weeks  2. Other pulmonary embolism without acute cor pulmonale, unspecified chronicity (HCC)  -     warfarin (COUMADIN) 2.5 mg tablet; 2.5mg all days except Monday and Friday take 1mg  -     warfarin (COUMADIN) 1 mg tablet; 1 tab PO Monday and Friday, 2.5 all other days. 3. Pulmonary fibrosis ([address]  Following with pulm  4. Chronic chest pain  -     oxyCODONE-acetaminophen (PERCOCET 10)  mg per tablet; Take 1 Tablet by mouth every eight (8) hours as needed for Pain for up to 30 days. Max Daily Amount: 3 Tablets. -     oxyCODONE-acetaminophen (PERCOCET 10)  mg per tablet; Take 1 Tablet by mouth every eight (8) hours as needed for Pain for up to 30 days. Max Daily Amount: 3 Tablets. -     oxyCODONE-acetaminophen (PERCOCET 10)  mg per tablet; Take 1 Tablet by mouth every eight (8) hours as needed for Pain for up to 30 days. Max Daily Amount: 3 Tablets. 5. Chronic bilateral low back pain, unspecified whether sciatica present  -     oxyCODONE-acetaminophen (PERCOCET 10)  mg per tablet; Take 1 Tablet by mouth every eight (8) hours as needed for Pain for up to 30 days. Max Daily Amount: 3 Tablets. -     oxyCODONE-acetaminophen (PERCOCET 10)  mg per tablet; Take 1 Tablet by mouth every eight (8) hours as needed for Pain for up to 30 days. Max Daily Amount: 3 Tablets. -     oxyCODONE-acetaminophen (PERCOCET 10)  mg per tablet;  Take 1 Tablet by mouth every eight (8) hours as needed for Pain for up to 30 days. Max Daily Amount: 3 Tablets. 6. Chronic pain of right knee  -     oxyCODONE-acetaminophen (PERCOCET 10)  mg per tablet; Take 1 Tablet by mouth every eight (8) hours as needed for Pain for up to 30 days. Max Daily Amount: 3 Tablets. -     oxyCODONE-acetaminophen (PERCOCET 10)  mg per tablet; Take 1 Tablet by mouth every eight (8) hours as needed for Pain for up to 30 days. Max Daily Amount: 3 Tablets. -     oxyCODONE-acetaminophen (PERCOCET 10)  mg per tablet; Take 1 Tablet by mouth every eight (8) hours as needed for Pain for up to 30 days. Max Daily Amount: 3 Tablets. 7.  Abdominal pain    Refer to [place] [facility]    Follow-up and Dispositions    Return in about 2 weeks (around 3/3/2023), or if symptoms worsen or fail to improve, for INR check. I have discussed the diagnosis with the patient and the intended plan as seen in the above orders. The patient has received an after-visit summary and questions were answered concerning future plans. Pt conveyed understanding of plan.     Medication Side Effects and Warnings were discussed with patient    An electronic signature was used to authenticate this note  Herbert Yo DO

## 2023-02-28 RX ORDER — NITROGLYCERIN 0.4 MG/1
TABLET SUBLINGUAL
Qty: 25 TABLET | Refills: 0 | Status: SHIPPED | OUTPATIENT
Start: 2023-02-28

## 2023-03-03 ENCOUNTER — OFFICE VISIT (OUTPATIENT)
Dept: FAMILY MEDICINE CLINIC | Age: 68
End: 2023-03-03

## 2023-03-03 VITALS
DIASTOLIC BLOOD PRESSURE: 57 MMHG | SYSTOLIC BLOOD PRESSURE: 93 MMHG | HEIGHT: 70 IN | OXYGEN SATURATION: 98 % | WEIGHT: 270 LBS | BODY MASS INDEX: 38.65 KG/M2 | HEART RATE: 66 BPM | RESPIRATION RATE: 16 BRPM | TEMPERATURE: 98.3 F

## 2023-03-03 DIAGNOSIS — I26.99 OTHER PULMONARY EMBOLISM WITHOUT ACUTE COR PULMONALE, UNSPECIFIED CHRONICITY (HCC): ICD-10-CM

## 2023-03-03 DIAGNOSIS — Z79.01 WARFARIN ANTICOAGULATION: Primary | ICD-10-CM

## 2023-03-03 DIAGNOSIS — Z12.31 ENCOUNTER FOR SCREENING MAMMOGRAM FOR MALIGNANT NEOPLASM OF BREAST: ICD-10-CM

## 2023-03-03 LAB
INR BLD: 2.1
PT POC: 25.6 SECONDS
VALID INTERNAL CONTROL?: YES

## 2023-03-03 RX ORDER — WARFARIN 1 MG/1
TABLET ORAL
Qty: 20 TABLET | Refills: 1 | Status: SHIPPED | OUTPATIENT
Start: 2023-03-03

## 2023-03-03 NOTE — PATIENT INSTRUCTIONS
A Healthy Lifestyle: Care Instructions  A healthy lifestyle can help you feel good, have more energy, and stay at a weight that's healthy for you. You can share a healthy lifestyle with your friends and family. And you can do it on your own. Eat meals with your friends or family. You could try cooking together. Plan activities with other people. Go for a walk with a friend, try a free online fitness class, or join a sports league. Eat a variety of healthy foods. These include fruits, vegetables, whole grains, low-fat dairy, and lean protein. Choose healthy portions of food. You can use the Nutrition Facts label on food packages as a guide. Eat more fruits and vegetables. You could add vegetables to sandwiches or add fruit to cereal.  Drink water when you are thirsty. Limit soda, juice, and sports drinks. Try to exercise most days. Aim for at least 2½ hours of exercise each week. Keep moving. Work in the garden or take your dog on a walk. Use the stairs instead of the elevator. If you use tobacco or nicotine, try to quit. Ask your doctor about programs and medicines to help you quit. Limit alcohol. Men should have no more than 2 drinks a day. Women should have no more than 1. For some people, no alcohol is the best choice. Follow-up care is a key part of your treatment and safety. Be sure to make and go to all appointments, and call your doctor if you are having problems. It's also a good idea to know your test results and keep a list of the medicines you take. Where can you learn more? Go to http://www.gray.com/  Enter Y039 in the search box to learn more about \"A Healthy Lifestyle: Care Instructions. \"  Current as of: March 9, 2022               Content Version: 13.4  © 9684-0193 [employer]. Care instructions adapted under license by [employer] (which disclaims liability or warranty for this information).  If you have questions about a medical condition or this instruction, always ask your healthcare professional. [place] [address] any warranty or liability for your use of this information.

## 2023-03-03 NOTE — PROGRESS NOTES
Progress Note    she is a 74y.o. year old female who presents for evalution. Subjective:     Pt for f/up INR and she states she has been out of the 1mg so has not been taking. Her INR is 2.1 today and was 5.4 last check. Reviewed PmHx, RxHx, FmHx, SocHx, AllgHx and updated and dated in the chart. Review of Systems - negative except as listed above in the HPI    Objective:     Vitals:    03/03/23 1432   BP: (!) 93/57   Pulse: 66   Resp: 16   Temp: 98.3 °F (36.8 °C)   TempSrc: Oral   SpO2: 98%   Weight: 270 lb (122.5 kg)   Height: 5' 10\" (1.778 m)       Current Outpatient Medications   Medication Sig    warfarin (COUMADIN) 1 mg tablet 1 tab PO Monday and Friday, 2.5 all other days. nitroglycerin (NITROSTAT) 0.4 mg SL tablet DISSOLVE ONE TABLET UNDER THE TONGUE EVERY 5 MINUTES AS NEEDED FOR CHEST PAIN. DO NOT EXCEED A TOTAL OF 3 DOSES IN 15 MINUTES    warfarin (COUMADIN) 2.5 mg tablet 2.5mg all days except Monday and Friday take 1mg    [START ON 4/24/2023] oxyCODONE-acetaminophen (PERCOCET 10)  mg per tablet Take 1 Tablet by mouth every eight (8) hours as needed for Pain for up to 30 days. Max Daily Amount: 3 Tablets. [START ON 3/26/2023] oxyCODONE-acetaminophen (PERCOCET 10)  mg per tablet Take 1 Tablet by mouth every eight (8) hours as needed for Pain for up to 30 days. Max Daily Amount: 3 Tablets. oxyCODONE-acetaminophen (PERCOCET 10)  mg per tablet Take 1 Tablet by mouth every eight (8) hours as needed for Pain for up to 30 days. Max Daily Amount: 3 Tablets. metoprolol succinate (TOPROL-XL) 50 mg XL tablet 25 mg.    sucralfate (CARAFATE) 1 gram tablet Take 1 tablet by mouth 4 times daily    folic acid (FOLVITE) 1 mg tablet Take 1 Tablet by mouth daily. midodrine (PROAMATINE) 2.5 mg tablet Take 1 tablet by mouth twice daily    potassium chloride (K-DUR, KLOR-CON M20) 20 mEq tablet Take 1 Tablet by mouth daily.     isosorbide mononitrate ER (IMDUR) 30 mg tablet Take 1 Tablet by mouth daily. pantoprazole (PROTONIX) 40 mg tablet Take 1 Tablet by mouth Before breakfast and dinner. Indications: gastritis    polyethylene glycol (MIRALAX) 17 gram packet Take 1 Packet by mouth daily as needed for Constipation. ondansetron (ZOFRAN ODT) 4 mg disintegrating tablet Take 1 Tablet by mouth every eight (8) hours as needed for Nausea or Vomiting. atorvastatin (LIPITOR) 80 mg tablet TAKE 1 TABLET BY MOUTH ONCE DAILY NIGHTLY    allopurinoL (ZYLOPRIM) 100 mg tablet Take 1 tablet by mouth once daily    CALCITRIOL PO Take 0.5 mg by mouth. albuterol (PROAIR HFA) 90 mcg/actuation inhaler Take 2 Puffs by inhalation every four (4) hours as needed for Wheezing. Oxygen O2 2L NC 24/7    fluocinoLONE (SYNALAR) 0.01 % cream Apply  to affected area two (2) times a day. (Patient not taking: No sig reported)    erythromycin (ERYC) 250 mg capsule Take 1 Capsule by mouth Before breakfast, lunch, and dinner. Indications: stomach muscle paralysis and decreased function (Patient not taking: No sig reported)     No current facility-administered medications for this visit. Physical Examination: General appearance - alert, well appearing, and in no distress  Mental status - alert, oriented to person, place, and time      Assessment/ Plan:   Diagnoses and all orders for this visit:    1. Warfarin anticoagulation  -     AMB POC PT/INR  -     warfarin (COUMADIN) 1 mg tablet; 1 tab PO Monday and Friday, 2.5 all other days. Restart the 1mg 2 days a week recheck 3-4 weeks. 2. Other pulmonary embolism without acute cor pulmonale, unspecified chronicity (HCC)  -     warfarin (COUMADIN) 1 mg tablet; 1 tab PO Monday and Friday, 2.5 all other days. 3.  Breast Cancer screening  Mammo ordered  Follow-up and Dispositions    Return if symptoms worsen or fail to improve. I have discussed the diagnosis with the patient and the intended plan as seen in the above orders.   The patient has received an after-visit summary and questions were answered concerning future plans. Pt conveyed understanding of plan.     Medication Side Effects and Warnings were discussed with patient    An electronic signature was used to authenticate this note  Anna De La Rosa DO

## 2023-03-03 NOTE — PROGRESS NOTES
Chief Complaint   Patient presents with    Follow-up     INR     Patient presents in office today for INR check. Head c/o a headache that started yesterday that she can't get rid of. Treating with Tylenol. No other concerns. 1. Have you been to the ER, urgent care clinic since your last visit? Hospitalized since your last visit? No    2. Have you seen or consulted any other health care providers outside of the [address] [place] [address] since your last visit? Include any pap smears or colon screening.  No      Learning Assessment 6/28/2019   PRIMARY LEARNER Patient   PRIMARY LANGUAGE ENGLISH   LEARNER PREFERENCE PRIMARY LISTENING   ANSWERED BY patient    RELATIONSHIP SELF

## 2023-03-10 ENCOUNTER — TRANSCRIBE ORDER (OUTPATIENT)
Dept: SCHEDULING | Age: 68
End: 2023-03-10

## 2023-03-10 DIAGNOSIS — J84.115 RESPIRATORY BRONCHIOLITIS INTERSTITIAL LUNG DISEASE (HCC): Primary | ICD-10-CM

## 2023-03-13 ENCOUNTER — DOCUMENTATION ONLY (OUTPATIENT)
Dept: FAMILY MEDICINE CLINIC | Age: 68
End: 2023-03-13

## 2023-03-20 ENCOUNTER — HOSPITAL ENCOUNTER (OUTPATIENT)
Dept: MAMMOGRAPHY | Age: 68
Discharge: HOME | End: 2023-03-20
Attending: FAMILY MEDICINE
Payer: MEDICARE

## 2023-03-20 VITALS — WEIGHT: 263 LBS | BODY MASS INDEX: 37.74 KG/M2

## 2023-03-20 DIAGNOSIS — Z12.31 ENCOUNTER FOR SCREENING MAMMOGRAM FOR MALIGNANT NEOPLASM OF BREAST: ICD-10-CM

## 2023-03-20 PROCEDURE — 77063 BREAST TOMOSYNTHESIS BI: CPT

## 2023-03-27 NOTE — PROGRESS NOTES
[facility] Dosing Services: 5/30/2018    Consult for Warfarin Dosing by Pharmacy by Dr. Brian Lucas provided for this 63 y.o.  female , for indication of Venous Thrombosis. Therapy continued from home: 2 mg on Mon, Fri and 3 mg (1.5 tabs) on Sun, Tue, Wed, Thurs, Sat  Dose to achieve an INR goal of 2-3    Order entered for  Warfarin  held tonight since patient already took dose this morning. Significant drug interactions: PTA allopurinol    Previous dose given Taken at home: 3 mg   PT/INR Lab Results   Component Value Date/Time    INR 3.3 (H) 05/30/2018 03:30 PM      Platelets Lab Results   Component Value Date/Time    PLATELET 113 67/60/0836 03:30 PM      H/H Lab Results   Component Value Date/Time    HGB 7.8 (L) 05/30/2018 03:30 PM        Pharmacy to follow daily and will provide subsequent Warfarin dosing based on clinical status.     Thank you,  Walt Sanchez, PharmD     Contact: [phone] Plan: Pt can call for refills as needed Render In Strict Bullet Format?: No Detail Level: Detailed Continue Regimen: ciclopirox topical solution

## 2023-03-31 ENCOUNTER — DOCUMENTATION ONLY (OUTPATIENT)
Dept: FAMILY MEDICINE CLINIC | Age: 68
End: 2023-03-31

## 2023-03-31 ENCOUNTER — OFFICE VISIT (OUTPATIENT)
Dept: FAMILY MEDICINE CLINIC | Age: 68
End: 2023-03-31

## 2023-03-31 VITALS
HEIGHT: 70 IN | TEMPERATURE: 97.9 F | DIASTOLIC BLOOD PRESSURE: 78 MMHG | OXYGEN SATURATION: 98 % | HEART RATE: 81 BPM | SYSTOLIC BLOOD PRESSURE: 124 MMHG | BODY MASS INDEX: 40.09 KG/M2 | WEIGHT: 280 LBS | RESPIRATION RATE: 16 BRPM

## 2023-03-31 DIAGNOSIS — G47.00 INSOMNIA, UNSPECIFIED TYPE: ICD-10-CM

## 2023-03-31 DIAGNOSIS — Z79.01 WARFARIN ANTICOAGULATION: Primary | ICD-10-CM

## 2023-03-31 LAB
INR BLD: 2
PT POC: 24.5 SECONDS
VALID INTERNAL CONTROL?: YES

## 2023-03-31 RX ORDER — TRAZODONE HYDROCHLORIDE 50 MG/1
TABLET ORAL
Qty: 180 TABLET | Refills: 1 | Status: SHIPPED | OUTPATIENT
Start: 2023-03-31

## 2023-03-31 NOTE — PROGRESS NOTES
Progress Note    she is a 74y.o. year old female who presents for evalution. Subjective:     Pt here for INR check and it is within normal range. She is having EGD colo in June and would prefer her to not hold, will have her set appt 1 week prior to procedure to ensure not over 3, and would of course prefer closer to 2.0 for procedure. She is having issues with insomnia and has decided to try something for this. Has never taken trazodone before. Reviewed PmHx, RxHx, FmHx, SocHx, AllgHx and updated and dated in the chart. Review of Systems - negative except as listed above in the HPI    Objective:     Vitals:    03/31/23 1420   BP: 124/78   Pulse: 81   Resp: 16   Temp: 97.9 °F (36.6 °C)   TempSrc: Oral   SpO2: 98%   Weight: 280 lb (127 kg)   Height: 5' 10\" (1.778 m)       Current Outpatient Medications   Medication Sig    traZODone (DESYREL) 50 mg tablet 1-2 tabs PO HS prn insomnia    warfarin (COUMADIN) 1 mg tablet 1 tab PO Monday and Friday, 2.5 all other days. nitroglycerin (NITROSTAT) 0.4 mg SL tablet DISSOLVE ONE TABLET UNDER THE TONGUE EVERY 5 MINUTES AS NEEDED FOR CHEST PAIN. DO NOT EXCEED A TOTAL OF 3 DOSES IN 15 MINUTES    warfarin (COUMADIN) 2.5 mg tablet 2.5mg all days except Monday and Friday take 1mg    [START ON 4/24/2023] oxyCODONE-acetaminophen (PERCOCET 10)  mg per tablet Take 1 Tablet by mouth every eight (8) hours as needed for Pain for up to 30 days. Max Daily Amount: 3 Tablets. oxyCODONE-acetaminophen (PERCOCET 10)  mg per tablet Take 1 Tablet by mouth every eight (8) hours as needed for Pain for up to 30 days. Max Daily Amount: 3 Tablets. metoprolol succinate (TOPROL-XL) 50 mg XL tablet 25 mg.    sucralfate (CARAFATE) 1 gram tablet Take 1 tablet by mouth 4 times daily    folic acid (FOLVITE) 1 mg tablet Take 1 Tablet by mouth daily.     midodrine (PROAMATINE) 2.5 mg tablet Take 1 tablet by mouth twice daily    potassium chloride (K-DUR, KLOR-CON M20) 20 mEq tablet Take 1 Tablet by mouth daily. isosorbide mononitrate ER (IMDUR) 30 mg tablet Take 1 Tablet by mouth daily. pantoprazole (PROTONIX) 40 mg tablet Take 1 Tablet by mouth Before breakfast and dinner. Indications: gastritis    polyethylene glycol (MIRALAX) 17 gram packet Take 1 Packet by mouth daily as needed for Constipation. ondansetron (ZOFRAN ODT) 4 mg disintegrating tablet Take 1 Tablet by mouth every eight (8) hours as needed for Nausea or Vomiting. atorvastatin (LIPITOR) 80 mg tablet TAKE 1 TABLET BY MOUTH ONCE DAILY NIGHTLY    allopurinoL (ZYLOPRIM) 100 mg tablet Take 1 tablet by mouth once daily    CALCITRIOL PO Take 0.5 mg by mouth. albuterol (PROAIR HFA) 90 mcg/actuation inhaler Take 2 Puffs by inhalation every four (4) hours as needed for Wheezing. Oxygen O2 2L NC 24/7    fluocinoLONE (SYNALAR) 0.01 % cream Apply  to affected area two (2) times a day. (Patient not taking: No sig reported)    erythromycin (ERYC) 250 mg capsule Take 1 Capsule by mouth Before breakfast, lunch, and dinner. Indications: stomach muscle paralysis and decreased function (Patient not taking: No sig reported)     No current facility-administered medications for this visit. Physical Examination: General appearance - alert, well appearing, and in no distress      Assessment/ Plan:   Diagnoses and all orders for this visit:    1. Warfarin anticoagulation  -     AMB POC PT/INR  Currently at goal no changes recheck 4 weeks. Discussed that I would prefer her not to stop her Coumadin prior to colonoscopy/EGD. Previous polyps were less than 1 cm. I would like her however to be seen 1 week before her procedure so we can check her INR to make sure it is not elevated and is close to 2.0 as possible  2.  Insomnia, unspecified type  -     traZODone (DESYREL) 50 mg tablet; 1-2 tabs PO HS prn insomnia     Follow-up and Dispositions    Return in about 4 weeks (around 4/28/2023), or if symptoms worsen or fail to improve. I have discussed the diagnosis with the patient and the intended plan as seen in the above orders. The patient has received an after-visit summary and questions were answered concerning future plans. Pt conveyed understanding of plan.     Medication Side Effects and Warnings were discussed with patient    An electronic signature was used to authenticate this note  Lisbeth Mckinnon DO

## 2023-03-31 NOTE — PROGRESS NOTES
Anticoagulation clearance faxed to [facility]. Fax number [phone] confirmation number [id number].

## 2023-03-31 NOTE — PATIENT INSTRUCTIONS
A Healthy Lifestyle: Care Instructions  A healthy lifestyle can help you feel good, have more energy, and stay at a weight that's healthy for you. You can share a healthy lifestyle with your friends and family. And you can do it on your own. Eat meals with your friends or family. You could try cooking together. Plan activities with other people. Go for a walk with a friend, try a free online fitness class, or join a sports league. Eat a variety of healthy foods. These include fruits, vegetables, whole grains, low-fat dairy, and lean protein. Choose healthy portions of food. You can use the Nutrition Facts label on food packages as a guide. Eat more fruits and vegetables. You could add vegetables to sandwiches or add fruit to cereal.   Drink water when you are thirsty. Limit soda, juice, and sports drinks. Try to exercise most days. Aim for at least 2½ hours of exercise each week. Keep moving. Work in the garden or take your dog on a walk. Use the stairs instead of the elevator. If you use tobacco or nicotine, try to quit. Ask your doctor about programs and medicines to help you quit. Limit alcohol. Men should have no more than 2 drinks a day. Women should have no more than 1. For some people, no alcohol is the best choice. Follow-up care is a key part of your treatment and safety. Be sure to make and go to all appointments, and call your doctor if you are having problems. It's also a good idea to know your test results and keep a list of the medicines you take. Where can you learn more? Go to http://www.gray.com/  Enter Q632 in the search box to learn more about \"A Healthy Lifestyle: Care Instructions. \"  Current as of: November 14, 2022               Content Version: 13.6  © 3503-1603 [employer]. Care instructions adapted under license by [employer] (which disclaims liability or warranty for this information).  If you have questions about a medical condition or this instruction, always ask your healthcare professional. [place] [address] any warranty or liability for your use of this information.

## 2023-04-21 DIAGNOSIS — J84.115 RESPIRATORY BRONCHIOLITIS ASSOCIATED INTERSTITIAL LUNG DISEASE (HCC): Primary | ICD-10-CM

## 2023-04-23 DIAGNOSIS — J84.115 RESPIRATORY BRONCHIOLITIS INTERSTITIAL LUNG DISEASE (HCC): Primary | ICD-10-CM

## 2023-05-03 ENCOUNTER — OFFICE VISIT (OUTPATIENT)
Dept: FAMILY MEDICINE CLINIC | Age: 68
End: 2023-05-03
Payer: MEDICARE

## 2023-05-03 VITALS
TEMPERATURE: 98.1 F | BODY MASS INDEX: 39.37 KG/M2 | RESPIRATION RATE: 16 BRPM | HEART RATE: 79 BPM | WEIGHT: 275 LBS | HEIGHT: 70 IN | OXYGEN SATURATION: 90 % | SYSTOLIC BLOOD PRESSURE: 130 MMHG | DIASTOLIC BLOOD PRESSURE: 61 MMHG

## 2023-05-03 DIAGNOSIS — I26.99 OTHER PULMONARY EMBOLISM WITHOUT ACUTE COR PULMONALE, UNSPECIFIED CHRONICITY (HCC): ICD-10-CM

## 2023-05-03 DIAGNOSIS — Z79.01 WARFARIN ANTICOAGULATION: Primary | ICD-10-CM

## 2023-05-03 LAB
INR BLD: 1.8
PT POC: 22.1 SECONDS
VALID INTERNAL CONTROL?: YES

## 2023-05-03 PROCEDURE — 1090F PRES/ABSN URINE INCON ASSESS: CPT | Performed by: FAMILY MEDICINE

## 2023-05-03 PROCEDURE — 99213 OFFICE O/P EST LOW 20 MIN: CPT | Performed by: FAMILY MEDICINE

## 2023-05-03 PROCEDURE — 1123F ACP DISCUSS/DSCN MKR DOCD: CPT | Performed by: FAMILY MEDICINE

## 2023-05-03 PROCEDURE — 85610 PROTHROMBIN TIME: CPT | Performed by: FAMILY MEDICINE

## 2023-05-03 PROCEDURE — G0463 HOSPITAL OUTPT CLINIC VISIT: HCPCS | Performed by: FAMILY MEDICINE

## 2023-05-03 PROCEDURE — 3017F COLORECTAL CA SCREEN DOC REV: CPT | Performed by: FAMILY MEDICINE

## 2023-05-03 PROCEDURE — G9717 DOC PT DX DEP/BP F/U NT REQ: HCPCS | Performed by: FAMILY MEDICINE

## 2023-05-03 PROCEDURE — G8427 DOCREV CUR MEDS BY ELIG CLIN: HCPCS | Performed by: FAMILY MEDICINE

## 2023-05-03 PROCEDURE — 1101F PT FALLS ASSESS-DOCD LE1/YR: CPT | Performed by: FAMILY MEDICINE

## 2023-05-03 PROCEDURE — 3078F DIAST BP <80 MM HG: CPT | Performed by: FAMILY MEDICINE

## 2023-05-03 PROCEDURE — G9899 SCRN MAM PERF RSLTS DOC: HCPCS | Performed by: FAMILY MEDICINE

## 2023-05-03 PROCEDURE — G8400 PT W/DXA NO RESULTS DOC: HCPCS | Performed by: FAMILY MEDICINE

## 2023-05-03 PROCEDURE — G8417 CALC BMI ABV UP PARAM F/U: HCPCS | Performed by: FAMILY MEDICINE

## 2023-05-03 PROCEDURE — G8536 NO DOC ELDER MAL SCRN: HCPCS | Performed by: FAMILY MEDICINE

## 2023-05-03 PROCEDURE — 3075F SYST BP GE 130 - 139MM HG: CPT | Performed by: FAMILY MEDICINE

## 2023-05-03 RX ORDER — WARFARIN 1 MG/1
TABLET ORAL
Qty: 30 TABLET | Refills: 1 | Status: SHIPPED | OUTPATIENT
Start: 2023-05-03

## 2023-05-16 LAB — HBA1C MFR BLD HPLC: 4.3 %

## 2023-05-23 RX ORDER — ALLOPURINOL 100 MG/1
TABLET ORAL
Qty: 90 TABLET | Refills: 0 | Status: SHIPPED | OUTPATIENT
Start: 2023-05-23

## 2023-05-24 RX ORDER — ATORVASTATIN CALCIUM 80 MG/1
TABLET, FILM COATED ORAL
Qty: 90 TABLET | Refills: 4 | Status: SHIPPED | OUTPATIENT
Start: 2023-05-24

## 2023-05-24 RX ORDER — WARFARIN SODIUM 2.5 MG/1
TABLET ORAL
Qty: 45 TABLET | Refills: 4 | Status: SHIPPED | OUTPATIENT
Start: 2023-05-24

## 2023-05-24 RX ORDER — NITROGLYCERIN 0.4 MG/1
TABLET SUBLINGUAL
Qty: 25 TABLET | Refills: 5 | Status: SHIPPED | OUTPATIENT
Start: 2023-05-24

## 2023-05-29 RX ORDER — SODIUM CHLORIDE 0.9 % (FLUSH) 0.9 %
5-40 SYRINGE (ML) INJECTION PRN
Status: CANCELLED | OUTPATIENT
Start: 2023-05-29

## 2023-05-29 RX ORDER — SODIUM CHLORIDE 0.9 % (FLUSH) 0.9 %
5-40 SYRINGE (ML) INJECTION EVERY 12 HOURS SCHEDULED
Status: CANCELLED | OUTPATIENT
Start: 2023-05-29

## 2023-05-29 RX ORDER — SODIUM CHLORIDE 9 MG/ML
25 INJECTION, SOLUTION INTRAVENOUS PRN
Status: CANCELLED | OUTPATIENT
Start: 2023-05-29

## 2023-05-30 ENCOUNTER — TELEPHONE (OUTPATIENT)
Age: 68
End: 2023-05-30

## 2023-05-31 ENCOUNTER — OFFICE VISIT (OUTPATIENT)
Age: 68
End: 2023-05-31
Payer: MEDICARE

## 2023-05-31 VITALS
WEIGHT: 275 LBS | HEART RATE: 80 BPM | TEMPERATURE: 98.5 F | BODY MASS INDEX: 39.37 KG/M2 | DIASTOLIC BLOOD PRESSURE: 69 MMHG | OXYGEN SATURATION: 98 % | SYSTOLIC BLOOD PRESSURE: 149 MMHG | HEIGHT: 70 IN | RESPIRATION RATE: 18 BRPM

## 2023-05-31 DIAGNOSIS — J44.9 CHRONIC OBSTRUCTIVE PULMONARY DISEASE, UNSPECIFIED COPD TYPE (HCC): ICD-10-CM

## 2023-05-31 DIAGNOSIS — M25.561 CHRONIC PAIN OF RIGHT KNEE: ICD-10-CM

## 2023-05-31 DIAGNOSIS — I50.22 CHRONIC SYSTOLIC (CONGESTIVE) HEART FAILURE (HCC): ICD-10-CM

## 2023-05-31 DIAGNOSIS — Z86.718 HX OF DEEP VENOUS THROMBOSIS: Primary | ICD-10-CM

## 2023-05-31 DIAGNOSIS — G89.29 CHRONIC PAIN OF RIGHT KNEE: ICD-10-CM

## 2023-05-31 LAB
POC INR: 2.4
PROTHROMBIN TIME, POC: 29.9

## 2023-05-31 PROCEDURE — 1090F PRES/ABSN URINE INCON ASSESS: CPT | Performed by: FAMILY MEDICINE

## 2023-05-31 PROCEDURE — G8400 PT W/DXA NO RESULTS DOC: HCPCS | Performed by: FAMILY MEDICINE

## 2023-05-31 PROCEDURE — 3077F SYST BP >= 140 MM HG: CPT | Performed by: FAMILY MEDICINE

## 2023-05-31 PROCEDURE — 99214 OFFICE O/P EST MOD 30 MIN: CPT | Performed by: FAMILY MEDICINE

## 2023-05-31 PROCEDURE — 3023F SPIROM DOC REV: CPT | Performed by: FAMILY MEDICINE

## 2023-05-31 PROCEDURE — 3078F DIAST BP <80 MM HG: CPT | Performed by: FAMILY MEDICINE

## 2023-05-31 PROCEDURE — G8427 DOCREV CUR MEDS BY ELIG CLIN: HCPCS | Performed by: FAMILY MEDICINE

## 2023-05-31 PROCEDURE — G8417 CALC BMI ABV UP PARAM F/U: HCPCS | Performed by: FAMILY MEDICINE

## 2023-05-31 PROCEDURE — 85610 PROTHROMBIN TIME: CPT | Performed by: FAMILY MEDICINE

## 2023-05-31 PROCEDURE — 1123F ACP DISCUSS/DSCN MKR DOCD: CPT | Performed by: FAMILY MEDICINE

## 2023-05-31 PROCEDURE — 1036F TOBACCO NON-USER: CPT | Performed by: FAMILY MEDICINE

## 2023-05-31 PROCEDURE — 3017F COLORECTAL CA SCREEN DOC REV: CPT | Performed by: FAMILY MEDICINE

## 2023-05-31 PROCEDURE — PBSHW AMB POC PT/INR: Performed by: FAMILY MEDICINE

## 2023-05-31 SDOH — ECONOMIC STABILITY: FOOD INSECURITY: WITHIN THE PAST 12 MONTHS, YOU WORRIED THAT YOUR FOOD WOULD RUN OUT BEFORE YOU GOT MONEY TO BUY MORE.: SOMETIMES TRUE

## 2023-05-31 SDOH — ECONOMIC STABILITY: FOOD INSECURITY: WITHIN THE PAST 12 MONTHS, THE FOOD YOU BOUGHT JUST DIDN'T LAST AND YOU DIDN'T HAVE MONEY TO GET MORE.: SOMETIMES TRUE

## 2023-05-31 SDOH — ECONOMIC STABILITY: INCOME INSECURITY: HOW HARD IS IT FOR YOU TO PAY FOR THE VERY BASICS LIKE FOOD, HOUSING, MEDICAL CARE, AND HEATING?: HARD

## 2023-05-31 SDOH — ECONOMIC STABILITY: HOUSING INSECURITY
IN THE LAST 12 MONTHS, WAS THERE A TIME WHEN YOU DID NOT HAVE A STEADY PLACE TO SLEEP OR SLEPT IN A SHELTER (INCLUDING NOW)?: NO

## 2023-05-31 NOTE — PROGRESS NOTES
Progress Note    she is a 74y.o. year old female who presents for evaluation. Subjective:     Pt here for INR check and currently at goal.  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 She has low QOL secondary to this ongoing issue. She has received clearance from her other specialists and she is aware she is high risk for any surgery. Seeing Dr Katerine Amin for her CHF. Letter needs to go to [facility] Dr Susanne Rose  Reviewed PmHx, RxHx, FmHx, SocHx, AllgHx and updated and dated in the chart. Review of Systems - negative except as listed above in the HPI    Objective:     Vitals:    05/31/23 1101   BP: (!) 149/69   Site: Right Upper Arm   Position: Sitting   Pulse: 80   Resp: 18   Temp: 98.5 °F (36.9 °C)   TempSrc: Oral   SpO2: 98%   Weight: 275 lb (124.7 kg)   Height: 5' 10\" (1.778 m)       Current Outpatient Medications   Medication Sig    nitroGLYCERIN (NITROSTAT) 0.4 MG SL tablet DISSOLVE ONE TABLET UNDER THE TONGUE EVERY 5 MINUTES AS NEEDED FOR CHEST PAIN. DO NOT EXCEED A TOTAL OF 3 DOSES IN 15 MINUTES    warfarin (COUMADIN) 2.5 MG tablet TAKE 1 MG TABLET BY MOUTH ONCE DAILY EVERY MONDAY, WEDNESDAY, FRIDAY.  ALTERNATE WITH 2.5 MG ON TUESDAY, THURSDAY AND SATURDAY    atorvastatin (LIPITOR) 80 MG tablet TAKE 1 TABLET BY MOUTH ONCE DAILY AT NIGHT    allopurinol (ZYLOPRIM) 100 MG tablet Take 1 tablet by mouth once daily    CALCITRIOL PO Take 0.5 mg by mouth    OXYGEN O2 2L NC 24/7    albuterol sulfate HFA (PROVENTIL;VENTOLIN;PROAIR) 108 (90 Base) MCG/ACT inhaler Inhale 2 puffs into the lungs every 4 hours as needed    folic acid (FOLVITE) 1 MG tablet Take 1 tablet by mouth daily    isosorbide mononitrate (IMDUR) 30 MG extended release tablet Take 1 tablet by mouth daily    metoprolol succinate (TOPROL XL) 50 MG extended release tablet 0.5 tablets    midodrine (PROAMATINE) 2.5 MG tablet Take 1 tablet by mouth 2 times daily    ondansetron (ZOFRAN-ODT) 4 MG disintegrating tablet

## 2023-05-31 NOTE — PROGRESS NOTES
Chief Complaint   Patient presents with    Anticoagulation       1. Patient in office today for inr check. Pt is taking 1mg Mon,Wed,and Fri and 2.5mg Tue,Thcadence,Sun. Have no concerns. 1. Have you been to the ER, urgent care clinic since your last visit? Hospitalized since your last visit? No    2. Have you seen or consulted any other health care providers outside of the [address] [place] [address] since your last visit? Include any pap smears or colon screening.  No

## 2023-06-02 ENCOUNTER — CLINICAL DOCUMENTATION (OUTPATIENT)
Age: 68
End: 2023-06-02

## 2023-06-07 ENCOUNTER — ANESTHESIA (OUTPATIENT)
Dept: OPERATING ROOM | Age: 68
End: 2023-06-07
Payer: MEDICARE

## 2023-06-07 ENCOUNTER — HOSPITAL ENCOUNTER (OUTPATIENT)
Facility: HOSPITAL | Age: 68
Setting detail: OUTPATIENT SURGERY
Discharge: HOME | End: 2023-06-07
Attending: INTERNAL MEDICINE | Admitting: INTERNAL MEDICINE
Payer: MEDICARE

## 2023-06-07 ENCOUNTER — ANESTHESIA EVENT (OUTPATIENT)
Dept: OPERATING ROOM | Age: 68
End: 2023-06-07
Payer: MEDICARE

## 2023-06-07 PROCEDURE — 7100000010 HC PHASE II RECOVERY - FIRST 15 MIN: Performed by: INTERNAL MEDICINE

## 2023-06-07 NOTE — PERIOP NOTE
Pt evaluated  by Dr. Matt Winkler, anesthesiologist, who has determined that pt is not a candidate for  EGD and colonoscopy today, as pt has not provided medical clearance from pulmonologist or cardiologist, has not stopped coumadin, and has multiple blood clots in RLE. Dr. Shayy Goddard has talked to the pt, offered her the opportunity to see one of his associates (at a different facility), and counseled pt to obtain a letter of clearance from her cardiologist and pulmonologist respectively. Pt verbalized understanding of plan as well as displeasure that neither procedure would be preformed today.

## 2023-07-11 ENCOUNTER — TELEPHONE (OUTPATIENT)
Age: 68
End: 2023-07-11

## 2023-07-11 DIAGNOSIS — M15.9 PRIMARY OSTEOARTHRITIS INVOLVING MULTIPLE JOINTS: ICD-10-CM

## 2023-07-11 DIAGNOSIS — R07.89 OTHER CHEST PAIN: Primary | ICD-10-CM

## 2023-07-11 RX ORDER — OXYCODONE AND ACETAMINOPHEN 10; 325 MG/1; MG/1
1 TABLET ORAL EVERY 8 HOURS PRN
Qty: 90 TABLET | Refills: 0 | Status: SHIPPED | OUTPATIENT
Start: 2023-07-11 | End: 2023-08-10

## 2023-07-11 NOTE — TELEPHONE ENCOUNTER
[facility] needs a refill of oxyCODONE-acetaminophen (PERCOCET)  MG per tablet. They have ? pills/supply left and are requesting a 30 day supply with refills. Pharmacy has been updated in the chart. Patient was advised or scheduled an appointment for the future and to request refills thru the [facility] or by requesting a refill from their pharmacy in the future. Patient was also advised to check with their pharmacy for status of when refills are available.

## 2023-07-11 NOTE — TELEPHONE ENCOUNTER
This has been refilled but she needs a dedicated pain management visit. It can be mixed with her INR I do not care but it needs to be documented in her chart.

## 2023-07-26 ENCOUNTER — OFFICE VISIT (OUTPATIENT)
Age: 68
End: 2023-07-26
Payer: MEDICARE

## 2023-07-26 VITALS
DIASTOLIC BLOOD PRESSURE: 61 MMHG | RESPIRATION RATE: 16 BRPM | HEIGHT: 70 IN | HEART RATE: 76 BPM | BODY MASS INDEX: 38.37 KG/M2 | TEMPERATURE: 98.1 F | OXYGEN SATURATION: 94 % | WEIGHT: 268 LBS | SYSTOLIC BLOOD PRESSURE: 98 MMHG

## 2023-07-26 DIAGNOSIS — Z79.01 LONG TERM (CURRENT) USE OF ANTICOAGULANTS: Primary | ICD-10-CM

## 2023-07-26 DIAGNOSIS — M25.561 CHRONIC PAIN OF RIGHT KNEE: ICD-10-CM

## 2023-07-26 DIAGNOSIS — Z51.81 MEDICATION MONITORING ENCOUNTER: ICD-10-CM

## 2023-07-26 DIAGNOSIS — R07.9 CHEST PAIN, UNSPECIFIED TYPE: ICD-10-CM

## 2023-07-26 DIAGNOSIS — G89.29 CHRONIC PAIN OF RIGHT KNEE: ICD-10-CM

## 2023-07-26 DIAGNOSIS — F11.20 OPIOID DEPENDENCE WITH CURRENT USE (HCC): ICD-10-CM

## 2023-07-26 LAB
POC INR: 4.8
PROTHROMBIN TIME, POC: 57.3

## 2023-07-26 PROCEDURE — 3078F DIAST BP <80 MM HG: CPT | Performed by: FAMILY MEDICINE

## 2023-07-26 PROCEDURE — 99214 OFFICE O/P EST MOD 30 MIN: CPT | Performed by: FAMILY MEDICINE

## 2023-07-26 PROCEDURE — 1090F PRES/ABSN URINE INCON ASSESS: CPT | Performed by: FAMILY MEDICINE

## 2023-07-26 PROCEDURE — 1036F TOBACCO NON-USER: CPT | Performed by: FAMILY MEDICINE

## 2023-07-26 PROCEDURE — G8417 CALC BMI ABV UP PARAM F/U: HCPCS | Performed by: FAMILY MEDICINE

## 2023-07-26 PROCEDURE — 3074F SYST BP LT 130 MM HG: CPT | Performed by: FAMILY MEDICINE

## 2023-07-26 PROCEDURE — PBSHW AMB POC PT/INR: Performed by: FAMILY MEDICINE

## 2023-07-26 PROCEDURE — G8400 PT W/DXA NO RESULTS DOC: HCPCS | Performed by: FAMILY MEDICINE

## 2023-07-26 PROCEDURE — 85610 PROTHROMBIN TIME: CPT | Performed by: FAMILY MEDICINE

## 2023-07-26 PROCEDURE — 3017F COLORECTAL CA SCREEN DOC REV: CPT | Performed by: FAMILY MEDICINE

## 2023-07-26 PROCEDURE — G8427 DOCREV CUR MEDS BY ELIG CLIN: HCPCS | Performed by: FAMILY MEDICINE

## 2023-07-26 PROCEDURE — 1123F ACP DISCUSS/DSCN MKR DOCD: CPT | Performed by: FAMILY MEDICINE

## 2023-07-26 RX ORDER — OXYCODONE AND ACETAMINOPHEN 10; 325 MG/1; MG/1
1 TABLET ORAL EVERY 8 HOURS PRN
Qty: 90 TABLET | Refills: 0 | Status: SHIPPED | OUTPATIENT
Start: 2023-10-10 | End: 2023-11-09

## 2023-07-26 RX ORDER — OXYCODONE AND ACETAMINOPHEN 10; 325 MG/1; MG/1
1 TABLET ORAL EVERY 8 HOURS PRN
Qty: 90 TABLET | Refills: 0 | Status: SHIPPED | OUTPATIENT
Start: 2023-08-11 | End: 2023-09-10

## 2023-07-26 RX ORDER — OXYCODONE AND ACETAMINOPHEN 10; 325 MG/1; MG/1
1 TABLET ORAL EVERY 8 HOURS PRN
Qty: 90 TABLET | Refills: 0 | Status: SHIPPED | OUTPATIENT
Start: 2023-09-10 | End: 2023-10-10

## 2023-07-26 NOTE — PROGRESS NOTES
Chief Complaint   Patient presents with    Follow-up     INR      Patient presents in office today for INR check. Also here for refill of Percocet. No concerns. 1. \"Have you been to the ER, urgent care clinic since your last visit? Hospitalized since your last visit? \" No    2. \"Have you seen or consulted any other health care providers outside of the [address] [place] [address] since your last visit? \" No     3. For patients aged 43-73: Has the patient had a colonoscopy / FIT/ Cologuard? Yes - no Care Gap present      If the patient is female:    4. For patients aged 43-66: Has the patient had a mammogram within the past 2 years? Yes - no Care Gap present      5. For patients aged 21-65: Has the patient had a pap smear?  NA - based on age or sex

## 2023-07-27 ENCOUNTER — TELEPHONE (OUTPATIENT)
Age: 68
End: 2023-07-27

## 2023-07-27 NOTE — TELEPHONE ENCOUNTER
Spoke with pharmacist advised of inr results and provider's recommendations,confirmed pt reported the same instructions. Have no further concerns at this time.

## 2023-07-27 NOTE — TELEPHONE ENCOUNTER
Mateus Washburn a pharmacist with [facility] called in for some info, he states pt is there for pre surgery apt. He is wanting to know what dose of Warfain pt was changed to and her last inr results. Call back number for him is [phone]. Thanks.

## 2023-08-01 LAB
BENZODIAZEPINES: NEGATIVE NG/ML
MEPERIDINE SERPLBLD-MCNC: NEGATIVE NG/ML
MEPERIDINE SERPLBLD-MCNC: NEGATIVE UG/ML
METHADONE: NEGATIVE NG/ML
O-NORTRAMADOL BLD-MCNC: NEGATIVE NG/ML
OPIATES: NEGATIVE NG/ML
PROPOXYPHENE: NEGATIVE NG/ML
THC METABOLITE: NEGATIVE NG/ML
TRAMADOL BLD-MCNC: NEGATIVE NG/ML

## 2023-08-07 ENCOUNTER — CLINICAL DOCUMENTATION (OUTPATIENT)
Age: 68
End: 2023-08-07

## 2023-08-07 LAB
BENZODIAZEPINES: NEGATIVE NG/ML
Lab: NEGATIVE NG/ML
MEPERIDINE SERPLBLD-MCNC: NEGATIVE NG/ML
MEPERIDINE SERPLBLD-MCNC: NEGATIVE UG/ML
METHADONE: NEGATIVE NG/ML
O-NORTRAMADOL BLD-MCNC: NEGATIVE NG/ML
OPIATES: NEGATIVE NG/ML
OXYCOCONE: NEGATIVE NG/ML
OXYCODONE CONF: NEGATIVE
OXYCODONE: NEGATIVE NG/ML
PROPOXYPHENE: NEGATIVE NG/ML
THC METABOLITE: NEGATIVE NG/ML
TRAMADOL BLD-MCNC: NEGATIVE NG/ML

## 2023-08-15 ENCOUNTER — TELEPHONE (OUTPATIENT)
Age: 68
End: 2023-08-15

## 2023-08-15 NOTE — TELEPHONE ENCOUNTER
[facility] is checking to see if paperwork has been fax about pt stopping the warfarin Please advise FAX # [phone]  paperwork put on Alex's desk lucien Perez

## 2023-08-16 NOTE — TELEPHONE ENCOUNTER
Letter faxed to [facility]. Fax number [phone]. Confirmation number [id number].

## 2023-09-11 ENCOUNTER — TELEPHONE (OUTPATIENT)
Age: 68
End: 2023-09-11

## 2023-09-11 NOTE — TELEPHONE ENCOUNTER
Dr Pati Sanchez- patient was admitted 08.23.23-09.11.2023. She would like to fax that discharge paperwork.  Dr Indio Chirinos would like to discuss patient as well : [phone]

## 2023-09-12 NOTE — TELEPHONE ENCOUNTER
Called and spoke with patient. KATHY scheduled for 9/21/23. She states that the abdominal pain is still there but getting better. She also states that she has to figure out who her new GI doctor is and will make an apt.

## 2023-09-12 NOTE — TELEPHONE ENCOUNTER
Spoke with the medicine hospitalist regarding patient. She does need a hospital follow-up and INR check. Her Coumadin was restarted.   See if her abdominal pain is getting better make sure she is following up with GI.

## 2023-09-14 NOTE — TELEPHONE ENCOUNTER
Gladis with [facility] called in and stated that they will need you to specify the stage of the pressure ulcer for the diagnosis of this patient or Medicare will not cover.   Phone [phone]
She is using \"at home care\" so it is irrelevant for [facility]
DISPLAY PLAN FREE TEXT

## 2023-09-21 ENCOUNTER — OFFICE VISIT (OUTPATIENT)
Age: 68
End: 2023-09-21
Payer: MEDICARE

## 2023-09-21 VITALS
SYSTOLIC BLOOD PRESSURE: 85 MMHG | DIASTOLIC BLOOD PRESSURE: 55 MMHG | BODY MASS INDEX: 38.37 KG/M2 | TEMPERATURE: 98.8 F | WEIGHT: 268 LBS | RESPIRATION RATE: 16 BRPM | HEIGHT: 70 IN | OXYGEN SATURATION: 96 % | HEART RATE: 85 BPM

## 2023-09-21 DIAGNOSIS — K85.00 IDIOPATHIC ACUTE PANCREATITIS WITHOUT INFECTION OR NECROSIS: ICD-10-CM

## 2023-09-21 DIAGNOSIS — Z09 HOSPITAL DISCHARGE FOLLOW-UP: ICD-10-CM

## 2023-09-21 DIAGNOSIS — Z79.01 CHRONIC ANTICOAGULATION: ICD-10-CM

## 2023-09-21 DIAGNOSIS — Z79.01 LONG TERM (CURRENT) USE OF ANTICOAGULANTS: Primary | ICD-10-CM

## 2023-09-21 DIAGNOSIS — I26.99 OTHER PULMONARY EMBOLISM WITHOUT ACUTE COR PULMONALE, UNSPECIFIED CHRONICITY (HCC): ICD-10-CM

## 2023-09-21 DIAGNOSIS — I95.9 HYPOTENSION, UNSPECIFIED HYPOTENSION TYPE: ICD-10-CM

## 2023-09-21 DIAGNOSIS — K65.0 ACUTE PERITONITIS (HCC): ICD-10-CM

## 2023-09-21 LAB
POC INR: 1
PROTHROMBIN TIME, POC: 11.6

## 2023-09-21 PROCEDURE — 1123F ACP DISCUSS/DSCN MKR DOCD: CPT | Performed by: FAMILY MEDICINE

## 2023-09-21 PROCEDURE — 1090F PRES/ABSN URINE INCON ASSESS: CPT | Performed by: FAMILY MEDICINE

## 2023-09-21 PROCEDURE — 1111F DSCHRG MED/CURRENT MED MERGE: CPT | Performed by: FAMILY MEDICINE

## 2023-09-21 PROCEDURE — 1036F TOBACCO NON-USER: CPT | Performed by: FAMILY MEDICINE

## 2023-09-21 PROCEDURE — PBSHW AMB POC PT/INR: Performed by: FAMILY MEDICINE

## 2023-09-21 PROCEDURE — G8417 CALC BMI ABV UP PARAM F/U: HCPCS | Performed by: FAMILY MEDICINE

## 2023-09-21 PROCEDURE — 3078F DIAST BP <80 MM HG: CPT | Performed by: FAMILY MEDICINE

## 2023-09-21 PROCEDURE — 85610 PROTHROMBIN TIME: CPT | Performed by: FAMILY MEDICINE

## 2023-09-21 PROCEDURE — 3074F SYST BP LT 130 MM HG: CPT | Performed by: FAMILY MEDICINE

## 2023-09-21 PROCEDURE — G8427 DOCREV CUR MEDS BY ELIG CLIN: HCPCS | Performed by: FAMILY MEDICINE

## 2023-09-21 PROCEDURE — 99214 OFFICE O/P EST MOD 30 MIN: CPT | Performed by: FAMILY MEDICINE

## 2023-09-21 PROCEDURE — G8400 PT W/DXA NO RESULTS DOC: HCPCS | Performed by: FAMILY MEDICINE

## 2023-09-21 PROCEDURE — 3017F COLORECTAL CA SCREEN DOC REV: CPT | Performed by: FAMILY MEDICINE

## 2023-09-21 RX ORDER — MIDODRINE HYDROCHLORIDE 10 MG/1
10 TABLET ORAL 3 TIMES DAILY
Qty: 90 TABLET | Refills: 3
Start: 2023-09-21 | End: 2023-09-21 | Stop reason: SDUPTHER

## 2023-09-21 RX ORDER — MIDODRINE HYDROCHLORIDE 10 MG/1
10 TABLET ORAL 3 TIMES DAILY
Qty: 270 TABLET | Refills: 3 | Status: SHIPPED | OUTPATIENT
Start: 2023-09-21

## 2023-09-21 RX ORDER — MAGNESIUM 30 MG
30 TABLET ORAL 2 TIMES DAILY
COMMUNITY

## 2023-09-21 NOTE — PROGRESS NOTES
Chief Complaint   Patient presents with    Follow-Up from Hospital     Patient presents in office today for KATHY f/u from [facility]. Was admitted 8/23/23 and discharged on 9/11/23. She was the admitted to [facility] on 9/15/23 for peritonitis. Discharge on 9/18/23. Also here for INR check. States that she has been off her coumadin for about 3 weeks. No other concerns    1. \"Have you been to the ER, urgent care clinic since your last visit? Hospitalized since your last visit? \" Yes When: 9/15/23  Where: [facility] Reason for visit: peritonitis    2. \"Have you seen or consulted any other health care providers outside of the [address] [place] [address] since your last visit? \" No     3. For patients aged 43-73: Has the patient had a colonoscopy / FIT/ Cologuard? Yes - no Care Gap present      If the patient is female:    4. For patients aged 43-66: Has the patient had a mammogram within the past 2 years? Yes - no Care Gap present      5. For patients aged 21-65: Has the patient had a pap smear?  No
Friday, 2.5 all other days. (Patient not taking: Reported on 9/21/2023)     No current facility-administered medications for this visit. Physical Examination: General appearance - chronically ill appearing  Mental status - alert, oriented to person, place, and time      Assessment/ Plan:   Richy Ang was seen today for follow-up from hospital.    Diagnoses and all orders for this visit:    Long term (current) use of anticoagulants  -     AMB POC PT/INR  2.5 daily for 3 days then go to usual dosing regimen  MWF 2.5mg, 1mg on Sun, Tues, Thurs, Sat  Hospital discharge follow-up  -     DE DISCHARGE MEDS RECONCILED W/ CURRENT OUTPATIENT MED LIST    Acute peritonitis ([address])  Doing better finished with abx. Idiopathic acute pancreatitis without infection or necrosis  She will f/up with GI  Other pulmonary embolism without acute cor pulmonale, unspecified chronicity (HCC)  Restart AC  Chronic anticoagulation  Restart AC  Hypotension, unspecified hypotension type  -     Discontinue: midodrine (PROAMATINE) 10 MG tablet; Take 1 tablet by mouth 3 times daily  -     midodrine (PROAMATINE) 10 MG tablet; Take 1 tablet by mouth 3 times daily       Return in about 3 weeks (around 10/12/2023), or if symptoms worsen or fail to improve. I have discussed the diagnosis with the patient and the intended plan as seen in the above orders. The patient has received an after-visit summary and questions were answered concerning future plans. Pt conveyed understanding of plan.     Medication Side Effects and Warnings were discussed with patient    An electronic signature was used to authenticate this note  Cristine Phillips DO

## 2023-10-10 ENCOUNTER — TELEPHONE (OUTPATIENT)
Age: 68
End: 2023-10-10

## 2023-10-10 RX ORDER — ALLOPURINOL 100 MG/1
100 TABLET ORAL DAILY
Qty: 90 TABLET | Refills: 3 | Status: SHIPPED | OUTPATIENT
Start: 2023-10-10

## 2023-10-10 NOTE — TELEPHONE ENCOUNTER
We received a fax refill request for Chey Rivas. Please escribe Allopurinol to their pharmacy. The pharmacy is correct in the chart and they are requesting a 30 day supply.

## 2023-10-12 ENCOUNTER — OFFICE VISIT (OUTPATIENT)
Age: 68
End: 2023-10-12
Payer: MEDICARE

## 2023-10-12 VITALS
BODY MASS INDEX: 38.51 KG/M2 | WEIGHT: 269 LBS | SYSTOLIC BLOOD PRESSURE: 96 MMHG | DIASTOLIC BLOOD PRESSURE: 62 MMHG | TEMPERATURE: 98.2 F | RESPIRATION RATE: 16 BRPM | HEART RATE: 72 BPM | OXYGEN SATURATION: 96 % | HEIGHT: 70 IN

## 2023-10-12 DIAGNOSIS — Z79.01 LONG TERM (CURRENT) USE OF ANTICOAGULANTS: Primary | ICD-10-CM

## 2023-10-12 DIAGNOSIS — Z23 ENCOUNTER FOR IMMUNIZATION: ICD-10-CM

## 2023-10-12 DIAGNOSIS — I87.001 POST-THROMBOTIC SYNDROME OF RIGHT LOWER EXTREMITY: ICD-10-CM

## 2023-10-12 LAB
POC INR: 3.5
PROTHROMBIN TIME, POC: 42.1

## 2023-10-12 PROCEDURE — 1123F ACP DISCUSS/DSCN MKR DOCD: CPT | Performed by: FAMILY MEDICINE

## 2023-10-12 PROCEDURE — 99214 OFFICE O/P EST MOD 30 MIN: CPT | Performed by: FAMILY MEDICINE

## 2023-10-12 PROCEDURE — 1090F PRES/ABSN URINE INCON ASSESS: CPT | Performed by: FAMILY MEDICINE

## 2023-10-12 PROCEDURE — G8400 PT W/DXA NO RESULTS DOC: HCPCS | Performed by: FAMILY MEDICINE

## 2023-10-12 PROCEDURE — G8417 CALC BMI ABV UP PARAM F/U: HCPCS | Performed by: FAMILY MEDICINE

## 2023-10-12 PROCEDURE — 90694 VACC AIIV4 NO PRSRV 0.5ML IM: CPT | Performed by: FAMILY MEDICINE

## 2023-10-12 PROCEDURE — 85610 PROTHROMBIN TIME: CPT | Performed by: FAMILY MEDICINE

## 2023-10-12 PROCEDURE — G8484 FLU IMMUNIZE NO ADMIN: HCPCS | Performed by: FAMILY MEDICINE

## 2023-10-12 PROCEDURE — G8427 DOCREV CUR MEDS BY ELIG CLIN: HCPCS | Performed by: FAMILY MEDICINE

## 2023-10-12 PROCEDURE — PBSHW AMB POC PT/INR: Performed by: FAMILY MEDICINE

## 2023-10-12 PROCEDURE — 1036F TOBACCO NON-USER: CPT | Performed by: FAMILY MEDICINE

## 2023-10-12 PROCEDURE — 3074F SYST BP LT 130 MM HG: CPT | Performed by: FAMILY MEDICINE

## 2023-10-12 PROCEDURE — 3017F COLORECTAL CA SCREEN DOC REV: CPT | Performed by: FAMILY MEDICINE

## 2023-10-12 PROCEDURE — PBSHW INFLUENZA, FLUAD, (AGE 65 Y+), IM, PF, 0.5 ML: Performed by: FAMILY MEDICINE

## 2023-10-12 PROCEDURE — 3078F DIAST BP <80 MM HG: CPT | Performed by: FAMILY MEDICINE

## 2023-10-12 NOTE — PROGRESS NOTES
Chief Complaint   Patient presents with    Follow-up     INR     Patient presents in office today for INR check. Would like to get a flu shot. No concerns. Pt / caregiver given opportunity to review vaccine information sheet prior to vaccine administration. Opportunity given for questions and concerns. No questions or concerns at this time. 1. \"Have you been to the ER, urgent care clinic since your last visit? Hospitalized since your last visit? \" No    2. \"Have you seen or consulted any other health care providers outside of the [address] [place] [address] since your last visit? \" No     3. For patients aged 43-73: Has the patient had a colonoscopy / FIT/ Cologuard? Yes - no Care Gap present      If the patient is female:    4. For patients aged 43-66: Has the patient had a mammogram within the past 2 years? Yes - no Care Gap present      5. For patients aged 21-65: Has the patient had a pap smear?  No
worsen or fail to improve, for inr.        I have discussed the diagnosis with the patient and the intended plan as seen in the above orders. The patient has received an after-visit summary and questions were answered concerning future plans. Pt conveyed understanding of plan.     Medication Side Effects and Warnings were discussed with patient    An electronic signature was used to authenticate this note  Leroy Henley DO

## 2023-10-25 ENCOUNTER — OFFICE VISIT (OUTPATIENT)
Age: 68
End: 2023-10-25
Payer: MEDICARE

## 2023-10-25 VITALS
SYSTOLIC BLOOD PRESSURE: 103 MMHG | OXYGEN SATURATION: 93 % | HEIGHT: 70 IN | RESPIRATION RATE: 18 BRPM | HEART RATE: 72 BPM | TEMPERATURE: 98.3 F | DIASTOLIC BLOOD PRESSURE: 65 MMHG | BODY MASS INDEX: 40.09 KG/M2 | WEIGHT: 280 LBS

## 2023-10-25 DIAGNOSIS — R07.9 CHEST PAIN, UNSPECIFIED TYPE: ICD-10-CM

## 2023-10-25 DIAGNOSIS — G89.29 CHRONIC PAIN OF RIGHT KNEE: ICD-10-CM

## 2023-10-25 DIAGNOSIS — Z79.01 WARFARIN ANTICOAGULATION: Primary | ICD-10-CM

## 2023-10-25 DIAGNOSIS — M25.561 CHRONIC PAIN OF RIGHT KNEE: ICD-10-CM

## 2023-10-25 DIAGNOSIS — R07.89 OTHER CHEST PAIN: ICD-10-CM

## 2023-10-25 DIAGNOSIS — R09.89 CHEST CONGESTION: ICD-10-CM

## 2023-10-25 DIAGNOSIS — M15.9 PRIMARY OSTEOARTHRITIS INVOLVING MULTIPLE JOINTS: ICD-10-CM

## 2023-10-25 LAB
POC INR: 3.8
PROTHROMBIN TIME, POC: 46

## 2023-10-25 PROCEDURE — 85610 PROTHROMBIN TIME: CPT | Performed by: FAMILY MEDICINE

## 2023-10-25 PROCEDURE — 1090F PRES/ABSN URINE INCON ASSESS: CPT | Performed by: FAMILY MEDICINE

## 2023-10-25 PROCEDURE — 3017F COLORECTAL CA SCREEN DOC REV: CPT | Performed by: FAMILY MEDICINE

## 2023-10-25 PROCEDURE — G8400 PT W/DXA NO RESULTS DOC: HCPCS | Performed by: FAMILY MEDICINE

## 2023-10-25 PROCEDURE — G8427 DOCREV CUR MEDS BY ELIG CLIN: HCPCS | Performed by: FAMILY MEDICINE

## 2023-10-25 PROCEDURE — 99214 OFFICE O/P EST MOD 30 MIN: CPT | Performed by: FAMILY MEDICINE

## 2023-10-25 PROCEDURE — 3074F SYST BP LT 130 MM HG: CPT | Performed by: FAMILY MEDICINE

## 2023-10-25 PROCEDURE — 3078F DIAST BP <80 MM HG: CPT | Performed by: FAMILY MEDICINE

## 2023-10-25 PROCEDURE — G8484 FLU IMMUNIZE NO ADMIN: HCPCS | Performed by: FAMILY MEDICINE

## 2023-10-25 PROCEDURE — 1123F ACP DISCUSS/DSCN MKR DOCD: CPT | Performed by: FAMILY MEDICINE

## 2023-10-25 PROCEDURE — 1036F TOBACCO NON-USER: CPT | Performed by: FAMILY MEDICINE

## 2023-10-25 PROCEDURE — PBSHW AMB POC PT/INR: Performed by: FAMILY MEDICINE

## 2023-10-25 PROCEDURE — G8417 CALC BMI ABV UP PARAM F/U: HCPCS | Performed by: FAMILY MEDICINE

## 2023-10-25 RX ORDER — OXYCODONE AND ACETAMINOPHEN 10; 325 MG/1; MG/1
1 TABLET ORAL EVERY 8 HOURS PRN
Qty: 90 TABLET | Refills: 0 | Status: SHIPPED | OUTPATIENT
Start: 2024-01-08 | End: 2024-02-07

## 2023-10-25 RX ORDER — OXYCODONE AND ACETAMINOPHEN 10; 325 MG/1; MG/1
1 TABLET ORAL EVERY 8 HOURS PRN
Qty: 90 TABLET | Refills: 0 | Status: SHIPPED | OUTPATIENT
Start: 2023-12-10 | End: 2024-01-09

## 2023-10-25 RX ORDER — WARFARIN SODIUM 2.5 MG/1
TABLET ORAL
Qty: 45 TABLET | Refills: 4 | Status: SHIPPED | OUTPATIENT
Start: 2023-10-25 | End: 2023-10-25 | Stop reason: SDUPTHER

## 2023-10-25 RX ORDER — OXYCODONE AND ACETAMINOPHEN 10; 325 MG/1; MG/1
1 TABLET ORAL EVERY 8 HOURS PRN
Qty: 90 TABLET | Refills: 0 | Status: SHIPPED | OUTPATIENT
Start: 2023-11-11 | End: 2023-12-11

## 2023-10-25 RX ORDER — WARFARIN SODIUM 2.5 MG/1
TABLET ORAL
Qty: 45 TABLET | Refills: 4 | Status: SHIPPED | OUTPATIENT
Start: 2023-10-25

## 2023-10-25 NOTE — PROGRESS NOTES
Chief Complaint   Patient presents with    Anticoagulation     1. Have you been to the ER, urgent care clinic since your last visit? Hospitalized since your last visit? No    2. Have you seen or consulted any other health care providers outside of the [address] [place] [address] since your last visit? Include any pap smears or colon screening.  No    Results for orders placed or performed in visit on 10/25/23   AMB POC PT/INR   Result Value Ref Range    Prothrombin time, POC 46.0     POC INR 3.8

## 2023-11-09 ENCOUNTER — OFFICE VISIT (OUTPATIENT)
Age: 68
End: 2023-11-09
Payer: MEDICARE

## 2023-11-09 VITALS
WEIGHT: 290 LBS | OXYGEN SATURATION: 89 % | HEIGHT: 70 IN | BODY MASS INDEX: 41.52 KG/M2 | SYSTOLIC BLOOD PRESSURE: 107 MMHG | RESPIRATION RATE: 16 BRPM | TEMPERATURE: 98.5 F | HEART RATE: 84 BPM | DIASTOLIC BLOOD PRESSURE: 66 MMHG

## 2023-11-09 DIAGNOSIS — Z79.01 WARFARIN ANTICOAGULATION: Primary | ICD-10-CM

## 2023-11-09 LAB
POC INR: 1.7
PROTHROMBIN TIME, POC: 20

## 2023-11-09 PROCEDURE — 1123F ACP DISCUSS/DSCN MKR DOCD: CPT | Performed by: FAMILY MEDICINE

## 2023-11-09 PROCEDURE — 3074F SYST BP LT 130 MM HG: CPT | Performed by: FAMILY MEDICINE

## 2023-11-09 PROCEDURE — 99213 OFFICE O/P EST LOW 20 MIN: CPT | Performed by: FAMILY MEDICINE

## 2023-11-09 PROCEDURE — 3078F DIAST BP <80 MM HG: CPT | Performed by: FAMILY MEDICINE

## 2023-11-09 PROCEDURE — G8427 DOCREV CUR MEDS BY ELIG CLIN: HCPCS | Performed by: FAMILY MEDICINE

## 2023-11-09 PROCEDURE — 3017F COLORECTAL CA SCREEN DOC REV: CPT | Performed by: FAMILY MEDICINE

## 2023-11-09 PROCEDURE — 85610 PROTHROMBIN TIME: CPT | Performed by: FAMILY MEDICINE

## 2023-11-09 PROCEDURE — PBSHW AMB POC PT/INR: Performed by: FAMILY MEDICINE

## 2023-11-09 PROCEDURE — 1090F PRES/ABSN URINE INCON ASSESS: CPT | Performed by: FAMILY MEDICINE

## 2023-11-09 PROCEDURE — G8417 CALC BMI ABV UP PARAM F/U: HCPCS | Performed by: FAMILY MEDICINE

## 2023-11-09 PROCEDURE — G8400 PT W/DXA NO RESULTS DOC: HCPCS | Performed by: FAMILY MEDICINE

## 2023-11-09 PROCEDURE — 1036F TOBACCO NON-USER: CPT | Performed by: FAMILY MEDICINE

## 2023-11-09 PROCEDURE — G8484 FLU IMMUNIZE NO ADMIN: HCPCS | Performed by: FAMILY MEDICINE

## 2023-11-09 RX ORDER — WARFARIN SODIUM 1 MG/1
TABLET ORAL
Qty: 30 TABLET | Refills: 3 | Status: SHIPPED | OUTPATIENT
Start: 2023-11-09 | End: 2023-11-09 | Stop reason: SDUPTHER

## 2023-11-09 RX ORDER — WARFARIN SODIUM 2.5 MG/1
TABLET ORAL
Qty: 45 TABLET | Refills: 4 | Status: SHIPPED | OUTPATIENT
Start: 2023-11-09

## 2023-11-09 RX ORDER — WARFARIN SODIUM 1 MG/1
TABLET ORAL
Qty: 30 TABLET | Refills: 3 | Status: SHIPPED | OUTPATIENT
Start: 2023-11-09

## 2023-11-09 RX ORDER — WARFARIN SODIUM 2.5 MG/1
TABLET ORAL
Qty: 45 TABLET | Refills: 4 | Status: SHIPPED | OUTPATIENT
Start: 2023-11-09 | End: 2023-11-09 | Stop reason: SDUPTHER

## 2023-11-09 NOTE — PROGRESS NOTES
Chief Complaint   Patient presents with    Follow-up     INR      Patient presents in office today for INR check. Has c/o pain in her right ear for 2-3 days. No other concerns. 1. \"Have you been to the ER, urgent care clinic since your last visit? Hospitalized since your last visit? \" No    2. \"Have you seen or consulted any other health care providers outside of the [address] [place] [address] since your last visit? \" No     3. For patients aged 43-73: Has the patient had a colonoscopy / FIT/ Cologuard? No      If the patient is female:    4. For patients aged 43-66: Has the patient had a mammogram within the past 2 years? Yes - no Care Gap present      5. For patients aged 21-65: Has the patient had a pap smear?  No

## 2023-12-15 ENCOUNTER — OFFICE VISIT (OUTPATIENT)
Age: 68
End: 2023-12-15
Payer: MEDICARE

## 2023-12-15 VITALS
DIASTOLIC BLOOD PRESSURE: 70 MMHG | HEIGHT: 70 IN | RESPIRATION RATE: 16 BRPM | HEART RATE: 77 BPM | OXYGEN SATURATION: 90 % | SYSTOLIC BLOOD PRESSURE: 106 MMHG | BODY MASS INDEX: 39.37 KG/M2 | WEIGHT: 275 LBS | TEMPERATURE: 98.4 F

## 2023-12-15 DIAGNOSIS — M25.511 ACUTE PAIN OF RIGHT SHOULDER: ICD-10-CM

## 2023-12-15 DIAGNOSIS — Z79.01 WARFARIN ANTICOAGULATION: Primary | ICD-10-CM

## 2023-12-15 LAB
POC INR: 3.9
PROTHROMBIN TIME, POC: 47.4

## 2023-12-15 PROCEDURE — 1090F PRES/ABSN URINE INCON ASSESS: CPT | Performed by: FAMILY MEDICINE

## 2023-12-15 PROCEDURE — 99214 OFFICE O/P EST MOD 30 MIN: CPT | Performed by: FAMILY MEDICINE

## 2023-12-15 PROCEDURE — 3078F DIAST BP <80 MM HG: CPT | Performed by: FAMILY MEDICINE

## 2023-12-15 PROCEDURE — 85610 PROTHROMBIN TIME: CPT | Performed by: FAMILY MEDICINE

## 2023-12-15 PROCEDURE — 3017F COLORECTAL CA SCREEN DOC REV: CPT | Performed by: FAMILY MEDICINE

## 2023-12-15 PROCEDURE — 1036F TOBACCO NON-USER: CPT | Performed by: FAMILY MEDICINE

## 2023-12-15 PROCEDURE — G8427 DOCREV CUR MEDS BY ELIG CLIN: HCPCS | Performed by: FAMILY MEDICINE

## 2023-12-15 PROCEDURE — G8400 PT W/DXA NO RESULTS DOC: HCPCS | Performed by: FAMILY MEDICINE

## 2023-12-15 PROCEDURE — 3074F SYST BP LT 130 MM HG: CPT | Performed by: FAMILY MEDICINE

## 2023-12-15 PROCEDURE — PBSHW AMB POC PT/INR: Performed by: FAMILY MEDICINE

## 2023-12-15 PROCEDURE — G8417 CALC BMI ABV UP PARAM F/U: HCPCS | Performed by: FAMILY MEDICINE

## 2023-12-15 PROCEDURE — G8484 FLU IMMUNIZE NO ADMIN: HCPCS | Performed by: FAMILY MEDICINE

## 2023-12-15 PROCEDURE — 1123F ACP DISCUSS/DSCN MKR DOCD: CPT | Performed by: FAMILY MEDICINE

## 2023-12-15 RX ORDER — METHOCARBAMOL 500 MG/1
500 TABLET, FILM COATED ORAL 2 TIMES DAILY PRN
Qty: 60 TABLET | Refills: 2 | Status: SHIPPED | OUTPATIENT
Start: 2023-12-15

## 2023-12-15 RX ORDER — WARFARIN SODIUM 1 MG/1
TABLET ORAL
Qty: 45 TABLET | Refills: 3 | Status: SHIPPED | OUTPATIENT
Start: 2023-12-15

## 2023-12-15 NOTE — PROGRESS NOTES
Chief Complaint   Patient presents with    Follow-up     INR check      Patient presents in office today for INR check. Has c/o right arm pain that started last week. States that she has limited ROM. No other concerns. 1. \"Have you been to the ER, urgent care clinic since your last visit? Hospitalized since your last visit? \" No    2. \"Have you seen or consulted any other health care providers outside of the [address] [place] [address] since your last visit? \" No     3. For patients aged 43-73: Has the patient had a colonoscopy / FIT/ Cologuard? No      If the patient is female:    4. For patients aged 43-66: Has the patient had a mammogram within the past 2 years? Yes - no Care Gap present      5. For patients aged 21-65: Has the patient had a pap smear?  NA - based on age or sex

## 2024-01-01 ENCOUNTER — HOSPITAL ENCOUNTER (INPATIENT)
Facility: HOSPITAL | Age: 69
LOS: 4 days | Discharge: HOSPICE | End: 2024-06-14
Attending: STUDENT IN AN ORGANIZED HEALTH CARE EDUCATION/TRAINING PROGRAM | Admitting: FAMILY MEDICINE
Payer: MEDICARE

## 2024-01-01 ENCOUNTER — APPOINTMENT (OUTPATIENT)
Facility: HOSPITAL | Age: 69
End: 2024-01-01
Payer: MEDICARE

## 2024-01-01 ENCOUNTER — HOSPICE ADMISSION (OUTPATIENT)
Age: 69
End: 2024-01-01
Payer: MEDICARE

## 2024-01-01 ENCOUNTER — CLINICAL DOCUMENTATION (OUTPATIENT)
Age: 69
End: 2024-01-01

## 2024-01-01 ENCOUNTER — TELEPHONE (OUTPATIENT)
Age: 69
End: 2024-01-01

## 2024-01-01 ENCOUNTER — HOSPITAL ENCOUNTER (INPATIENT)
Facility: HOSPITAL | Age: 69
LOS: 1 days | End: 2024-06-15
Attending: FAMILY MEDICINE | Admitting: FAMILY MEDICINE
Payer: COMMERCIAL

## 2024-01-01 VITALS
SYSTOLIC BLOOD PRESSURE: 76 MMHG | HEART RATE: 99 BPM | WEIGHT: 287.92 LBS | BODY MASS INDEX: 41.22 KG/M2 | OXYGEN SATURATION: 89 % | DIASTOLIC BLOOD PRESSURE: 52 MMHG | TEMPERATURE: 97.5 F | RESPIRATION RATE: 14 BRPM | HEIGHT: 70 IN

## 2024-01-01 VITALS
SYSTOLIC BLOOD PRESSURE: 67 MMHG | HEART RATE: 98 BPM | TEMPERATURE: 97.7 F | DIASTOLIC BLOOD PRESSURE: 33 MMHG | RESPIRATION RATE: 19 BRPM

## 2024-01-01 DIAGNOSIS — J18.9 PNEUMONIA OF BOTH LUNGS DUE TO INFECTIOUS ORGANISM, UNSPECIFIED PART OF LUNG: ICD-10-CM

## 2024-01-01 DIAGNOSIS — A41.9 SEPTIC SHOCK (HCC): Primary | ICD-10-CM

## 2024-01-01 DIAGNOSIS — E87.20 LACTIC ACIDOSIS: ICD-10-CM

## 2024-01-01 DIAGNOSIS — J90 PLEURAL EFFUSION: ICD-10-CM

## 2024-01-01 DIAGNOSIS — I95.9 HYPOTENSION, UNSPECIFIED HYPOTENSION TYPE: ICD-10-CM

## 2024-01-01 DIAGNOSIS — K85.90 ACUTE PANCREATITIS, UNSPECIFIED COMPLICATION STATUS, UNSPECIFIED PANCREATITIS TYPE: ICD-10-CM

## 2024-01-01 DIAGNOSIS — R65.21 SEPTIC SHOCK (HCC): Primary | ICD-10-CM

## 2024-01-01 LAB
ALBUMIN FLD-MCNC: 0 G/DL
ALBUMIN SERPL-MCNC: 1.4 G/DL (ref 3.5–5)
ALBUMIN SERPL-MCNC: 1.5 G/DL (ref 3.5–5)
ALBUMIN SERPL-MCNC: 1.6 G/DL (ref 3.5–5)
ALBUMIN SERPL-MCNC: 1.7 G/DL (ref 3.5–5)
ALBUMIN/GLOB SERPL: 0.4 (ref 1.1–2.2)
ALBUMIN/GLOB SERPL: 0.5 (ref 1.1–2.2)
ALP SERPL-CCNC: 157 U/L (ref 45–117)
ALP SERPL-CCNC: 162 U/L (ref 45–117)
ALP SERPL-CCNC: 177 U/L (ref 45–117)
ALP SERPL-CCNC: 186 U/L (ref 45–117)
ALT SERPL-CCNC: 12 U/L (ref 12–78)
ALT SERPL-CCNC: 12 U/L (ref 12–78)
ALT SERPL-CCNC: 13 U/L (ref 12–78)
ALT SERPL-CCNC: 14 U/L (ref 12–78)
AMMONIA PLAS-SCNC: <10 UMOL/L
AMYLASE FLD-CCNC: 25 U/L
ANION GAP SERPL CALC-SCNC: 10 MMOL/L (ref 5–15)
ANION GAP SERPL CALC-SCNC: 10 MMOL/L (ref 5–15)
ANION GAP SERPL CALC-SCNC: 11 MMOL/L (ref 5–15)
ANION GAP SERPL CALC-SCNC: 8 MMOL/L (ref 5–15)
APPEARANCE FLD: ABNORMAL
APPEARANCE FLD: CLEAR
AST SERPL-CCNC: 17 U/L (ref 15–37)
AST SERPL-CCNC: 18 U/L (ref 15–37)
AST SERPL-CCNC: 19 U/L (ref 15–37)
AST SERPL-CCNC: 24 U/L (ref 15–37)
BACTERIA SPEC CULT: NORMAL
BASOPHILS # BLD: 0 K/UL (ref 0–0.1)
BASOPHILS NFR BLD: 0 % (ref 0–1)
BILIRUB SERPL-MCNC: 0.5 MG/DL (ref 0.2–1)
BILIRUB SERPL-MCNC: 0.6 MG/DL (ref 0.2–1)
BUN SERPL-MCNC: 20 MG/DL (ref 6–20)
BUN SERPL-MCNC: 21 MG/DL (ref 6–20)
BUN SERPL-MCNC: 26 MG/DL (ref 6–20)
BUN SERPL-MCNC: 30 MG/DL (ref 6–20)
BUN/CREAT SERPL: 4 (ref 12–20)
BUN/CREAT SERPL: 4 (ref 12–20)
BUN/CREAT SERPL: 5 (ref 12–20)
BUN/CREAT SERPL: 6 (ref 12–20)
CALCIUM SERPL-MCNC: 8.4 MG/DL (ref 8.5–10.1)
CALCIUM SERPL-MCNC: 8.8 MG/DL (ref 8.5–10.1)
CALCIUM SERPL-MCNC: 9.3 MG/DL (ref 8.5–10.1)
CALCIUM SERPL-MCNC: 9.5 MG/DL (ref 8.5–10.1)
CHLORIDE SERPL-SCNC: 95 MMOL/L (ref 97–108)
CHLORIDE SERPL-SCNC: 97 MMOL/L (ref 97–108)
CO2 SERPL-SCNC: 23 MMOL/L (ref 21–32)
CO2 SERPL-SCNC: 24 MMOL/L (ref 21–32)
CO2 SERPL-SCNC: 26 MMOL/L (ref 21–32)
CO2 SERPL-SCNC: 28 MMOL/L (ref 21–32)
COLOR FLD: COLORLESS
COLOR FLD: COLORLESS
CREAT SERPL-MCNC: 5.32 MG/DL (ref 0.55–1.02)
CREAT SERPL-MCNC: 5.35 MG/DL (ref 0.55–1.02)
CREAT SERPL-MCNC: 5.39 MG/DL (ref 0.55–1.02)
CREAT SERPL-MCNC: 5.44 MG/DL (ref 0.55–1.02)
DIFFERENTIAL METHOD BLD: ABNORMAL
EKG ATRIAL RATE: 98 BPM
EKG DIAGNOSIS: NORMAL
EKG P AXIS: 28 DEGREES
EKG P-R INTERVAL: 176 MS
EKG Q-T INTERVAL: 144 MS
EKG QRS DURATION: 62 MS
EKG QTC CALCULATION (BAZETT): 183 MS
EKG R AXIS: -3 DEGREES
EKG T AXIS: 76 DEGREES
EKG VENTRICULAR RATE: 98 BPM
EOSINOPHIL # BLD: 0 K/UL (ref 0–0.4)
EOSINOPHIL # BLD: 0.1 K/UL (ref 0–0.4)
EOSINOPHIL NFR BLD: 0 % (ref 0–7)
EOSINOPHIL NFR BLD: 1 % (ref 0–7)
ERYTHROCYTE [DISTWIDTH] IN BLOOD BY AUTOMATED COUNT: 18.1 % (ref 11.5–14.5)
ERYTHROCYTE [DISTWIDTH] IN BLOOD BY AUTOMATED COUNT: 18.6 % (ref 11.5–14.5)
ERYTHROCYTE [DISTWIDTH] IN BLOOD BY AUTOMATED COUNT: 18.7 % (ref 11.5–14.5)
ERYTHROCYTE [DISTWIDTH] IN BLOOD BY AUTOMATED COUNT: 18.7 % (ref 11.5–14.5)
GLOBULIN SER CALC-MCNC: 3.1 G/DL (ref 2–4)
GLOBULIN SER CALC-MCNC: 3.4 G/DL (ref 2–4)
GLOBULIN SER CALC-MCNC: 3.4 G/DL (ref 2–4)
GLOBULIN SER CALC-MCNC: 3.7 G/DL (ref 2–4)
GLUCOSE BLD STRIP.AUTO-MCNC: 124 MG/DL (ref 65–117)
GLUCOSE BLD STRIP.AUTO-MCNC: 167 MG/DL (ref 65–117)
GLUCOSE BLD STRIP.AUTO-MCNC: 172 MG/DL (ref 65–117)
GLUCOSE BLD STRIP.AUTO-MCNC: 179 MG/DL (ref 65–117)
GLUCOSE BLD STRIP.AUTO-MCNC: 193 MG/DL (ref 65–117)
GLUCOSE BLD STRIP.AUTO-MCNC: 195 MG/DL (ref 65–117)
GLUCOSE BLD STRIP.AUTO-MCNC: 232 MG/DL (ref 65–117)
GLUCOSE BLD STRIP.AUTO-MCNC: 257 MG/DL (ref 65–117)
GLUCOSE FLD-MCNC: 400 MG/DL
GLUCOSE SERPL-MCNC: 180 MG/DL (ref 65–100)
GLUCOSE SERPL-MCNC: 180 MG/DL (ref 65–100)
GLUCOSE SERPL-MCNC: 201 MG/DL (ref 65–100)
GLUCOSE SERPL-MCNC: 207 MG/DL (ref 65–100)
GRAM STN SPEC: NORMAL
HCT VFR BLD AUTO: 29.5 % (ref 35–47)
HCT VFR BLD AUTO: 32.3 % (ref 35–47)
HCT VFR BLD AUTO: 33.4 % (ref 35–47)
HCT VFR BLD AUTO: 38.9 % (ref 35–47)
HGB BLD-MCNC: 10.9 G/DL (ref 11.5–16)
HGB BLD-MCNC: 11.3 G/DL (ref 11.5–16)
HGB BLD-MCNC: 12.7 G/DL (ref 11.5–16)
HGB BLD-MCNC: 9.9 G/DL (ref 11.5–16)
IMM GRANULOCYTES # BLD AUTO: 0.2 K/UL (ref 0–0.04)
IMM GRANULOCYTES # BLD AUTO: 0.3 K/UL (ref 0–0.04)
IMM GRANULOCYTES NFR BLD AUTO: 1 % (ref 0–0.5)
INR PPP: 2.7 (ref 0.9–1.1)
INR PPP: 2.9 (ref 0.9–1.1)
INR PPP: 3 (ref 0.9–1.1)
INR PPP: 3.1 (ref 0.9–1.1)
LACTATE BLD-SCNC: 4.61 MMOL/L (ref 0.4–2)
LACTATE SERPL-SCNC: 4.2 MMOL/L (ref 0.4–2)
LACTATE SERPL-SCNC: 4.4 MMOL/L (ref 0.4–2)
LACTATE SERPL-SCNC: 4.8 MMOL/L (ref 0.4–2)
LACTATE SERPL-SCNC: 5.3 MMOL/L (ref 0.4–2)
LACTATE SERPL-SCNC: 5.4 MMOL/L (ref 0.4–2)
LACTATE SERPL-SCNC: 5.8 MMOL/L (ref 0.4–2)
LACTATE SERPL-SCNC: 5.9 MMOL/L (ref 0.4–2)
LACTATE SERPL-SCNC: 5.9 MMOL/L (ref 0.4–2)
LACTATE SERPL-SCNC: 6.1 MMOL/L (ref 0.4–2)
LACTATE SERPL-SCNC: 7 MMOL/L (ref 0.4–2)
LACTATE SERPL-SCNC: 7 MMOL/L (ref 0.4–2)
LACTATE SERPL-SCNC: 7.9 MMOL/L (ref 0.4–2)
LDH FLD L TO P-CCNC: 15 U/L
LIPASE SERPL-CCNC: 1624 U/L (ref 13–75)
LIPASE SERPL-CCNC: 480 U/L (ref 13–75)
LYMPHOCYTES # BLD: 0.5 K/UL (ref 0.8–3.5)
LYMPHOCYTES # BLD: 0.7 K/UL (ref 0.8–3.5)
LYMPHOCYTES # BLD: 0.8 K/UL (ref 0.8–3.5)
LYMPHOCYTES # BLD: 1.1 K/UL (ref 0.8–3.5)
LYMPHOCYTES NFR BLD: 2 % (ref 12–49)
LYMPHOCYTES NFR BLD: 3 % (ref 12–49)
LYMPHOCYTES NFR BLD: 3 % (ref 12–49)
LYMPHOCYTES NFR BLD: 6 % (ref 12–49)
LYMPHOCYTES NFR FLD: 10 %
LYMPHOCYTES NFR FLD: 2 %
MCH RBC QN AUTO: 30.9 PG (ref 26–34)
MCH RBC QN AUTO: 31.1 PG (ref 26–34)
MCH RBC QN AUTO: 31.1 PG (ref 26–34)
MCH RBC QN AUTO: 31.6 PG (ref 26–34)
MCHC RBC AUTO-ENTMCNC: 32.6 G/DL (ref 30–36.5)
MCHC RBC AUTO-ENTMCNC: 33.6 G/DL (ref 30–36.5)
MCHC RBC AUTO-ENTMCNC: 33.7 G/DL (ref 30–36.5)
MCHC RBC AUTO-ENTMCNC: 33.8 G/DL (ref 30–36.5)
MCV RBC AUTO: 92 FL (ref 80–99)
MCV RBC AUTO: 92.2 FL (ref 80–99)
MCV RBC AUTO: 93.3 FL (ref 80–99)
MCV RBC AUTO: 95.1 FL (ref 80–99)
MONOCYTES # BLD: 0.5 K/UL (ref 0–1)
MONOCYTES # BLD: 1 K/UL (ref 0–1)
MONOCYTES NFR BLD: 2 % (ref 5–13)
MONOCYTES NFR BLD: 5 % (ref 5–13)
MONOS+MACROS NFR FLD: 26 %
MONOS+MACROS NFR FLD: 8 %
NEUTROPHILS NFR FLD: 64 %
NEUTROPHILS NFR FLD: 90 %
NEUTS SEG # BLD: 16.5 K/UL (ref 1.8–8)
NEUTS SEG # BLD: 22.5 K/UL (ref 1.8–8)
NEUTS SEG # BLD: 23.4 K/UL (ref 1.8–8)
NEUTS SEG # BLD: 23.4 K/UL (ref 1.8–8)
NEUTS SEG NFR BLD: 87 % (ref 32–75)
NEUTS SEG NFR BLD: 94 % (ref 32–75)
NEUTS SEG NFR BLD: 94 % (ref 32–75)
NEUTS SEG NFR BLD: 95 % (ref 32–75)
NRBC # BLD: 0.19 K/UL (ref 0–0.01)
NRBC # BLD: 0.25 K/UL (ref 0–0.01)
NRBC # BLD: 0.26 K/UL (ref 0–0.01)
NRBC # BLD: 0.33 K/UL (ref 0–0.01)
NRBC BLD-RTO: 0.8 PER 100 WBC
NRBC BLD-RTO: 1 PER 100 WBC
NRBC BLD-RTO: 1.1 PER 100 WBC
NRBC BLD-RTO: 1.7 PER 100 WBC
NT PRO BNP: 4807 PG/ML
NUC CELL # FLD: 221 /CU MM
NUC CELL # FLD: 31 /CU MM
PHOSPHATE SERPL-MCNC: 4 MG/DL (ref 2.6–4.7)
PLATELET # BLD AUTO: 104 K/UL (ref 150–400)
PLATELET # BLD AUTO: 70 K/UL (ref 150–400)
PLATELET # BLD AUTO: 81 K/UL (ref 150–400)
PLATELET # BLD AUTO: 81 K/UL (ref 150–400)
PMV BLD AUTO: 12.8 FL (ref 8.9–12.9)
POTASSIUM SERPL-SCNC: 3.9 MMOL/L (ref 3.5–5.1)
POTASSIUM SERPL-SCNC: 4.3 MMOL/L (ref 3.5–5.1)
POTASSIUM SERPL-SCNC: 4.5 MMOL/L (ref 3.5–5.1)
POTASSIUM SERPL-SCNC: 4.7 MMOL/L (ref 3.5–5.1)
PROT FLD-MCNC: 0.2 G/DL
PROT SERPL-MCNC: 4.5 G/DL (ref 6.4–8.2)
PROT SERPL-MCNC: 4.9 G/DL (ref 6.4–8.2)
PROT SERPL-MCNC: 5 G/DL (ref 6.4–8.2)
PROT SERPL-MCNC: 5.4 G/DL (ref 6.4–8.2)
PROTHROMBIN TIME: 26.3 SEC (ref 9–11.1)
PROTHROMBIN TIME: 28.2 SEC (ref 9–11.1)
PROTHROMBIN TIME: 28.8 SEC (ref 9–11.1)
PROTHROMBIN TIME: 29.8 SEC (ref 9–11.1)
RBC # BLD AUTO: 3.2 M/UL (ref 3.8–5.2)
RBC # BLD AUTO: 3.51 M/UL (ref 3.8–5.2)
RBC # BLD AUTO: 3.58 M/UL (ref 3.8–5.2)
RBC # BLD AUTO: 4.09 M/UL (ref 3.8–5.2)
RBC # FLD: 11 /CU MM
RBC # FLD: 44 /CU MM
RBC MORPH BLD: ABNORMAL
SERVICE CMNT-IMP: ABNORMAL
SERVICE CMNT-IMP: NORMAL
SODIUM SERPL-SCNC: 130 MMOL/L (ref 136–145)
SODIUM SERPL-SCNC: 131 MMOL/L (ref 136–145)
SODIUM SERPL-SCNC: 132 MMOL/L (ref 136–145)
SODIUM SERPL-SCNC: 133 MMOL/L (ref 136–145)
SPECIMEN SOURCE FLD: ABNORMAL
SPECIMEN SOURCE FLD: ABNORMAL
SPECIMEN SOURCE FLD: NORMAL
TROPONIN I SERPL HS-MCNC: 31 NG/L (ref 0–51)
TROPONIN I SERPL HS-MCNC: 33 NG/L (ref 0–51)
TSH SERPL DL<=0.05 MIU/L-ACNC: 1.14 UIU/ML (ref 0.36–3.74)
VANCOMYCIN SERPL-MCNC: 17.4 UG/ML
WBC # BLD AUTO: 19 K/UL (ref 3.6–11)
WBC # BLD AUTO: 23.9 K/UL (ref 3.6–11)
WBC # BLD AUTO: 24.6 K/UL (ref 3.6–11)
WBC # BLD AUTO: 25 K/UL (ref 3.6–11)

## 2024-01-01 PROCEDURE — 94761 N-INVAS EAR/PLS OXIMETRY MLT: CPT

## 2024-01-01 PROCEDURE — 6360000002 HC RX W HCPCS: Performed by: INTERNAL MEDICINE

## 2024-01-01 PROCEDURE — P9047 ALBUMIN (HUMAN), 25%, 50ML: HCPCS | Performed by: INTERNAL MEDICINE

## 2024-01-01 PROCEDURE — 94660 CPAP INITIATION&MGMT: CPT

## 2024-01-01 PROCEDURE — C9113 INJ PANTOPRAZOLE SODIUM, VIA: HCPCS | Performed by: INTERNAL MEDICINE

## 2024-01-01 PROCEDURE — 85610 PROTHROMBIN TIME: CPT

## 2024-01-01 PROCEDURE — 3E1M39Z IRRIGATION OF PERITONEAL CAVITY USING DIALYSATE, PERCUTANEOUS APPROACH: ICD-10-PCS | Performed by: INTERNAL MEDICINE

## 2024-01-01 PROCEDURE — 96375 TX/PRO/DX INJ NEW DRUG ADDON: CPT

## 2024-01-01 PROCEDURE — 83690 ASSAY OF LIPASE: CPT

## 2024-01-01 PROCEDURE — 84443 ASSAY THYROID STIM HORMONE: CPT

## 2024-01-01 PROCEDURE — 2700000000 HC OXYGEN THERAPY PER DAY

## 2024-01-01 PROCEDURE — 6370000000 HC RX 637 (ALT 250 FOR IP): Performed by: NURSE PRACTITIONER

## 2024-01-01 PROCEDURE — 83615 LACTATE (LD) (LDH) ENZYME: CPT

## 2024-01-01 PROCEDURE — 82962 GLUCOSE BLOOD TEST: CPT

## 2024-01-01 PROCEDURE — 6360000002 HC RX W HCPCS: Performed by: NURSE PRACTITIONER

## 2024-01-01 PROCEDURE — 2500000003 HC RX 250 WO HCPCS: Performed by: STUDENT IN AN ORGANIZED HEALTH CARE EDUCATION/TRAINING PROGRAM

## 2024-01-01 PROCEDURE — 80053 COMPREHEN METABOLIC PANEL: CPT

## 2024-01-01 PROCEDURE — 6360000002 HC RX W HCPCS: Performed by: STUDENT IN AN ORGANIZED HEALTH CARE EDUCATION/TRAINING PROGRAM

## 2024-01-01 PROCEDURE — 87040 BLOOD CULTURE FOR BACTERIA: CPT

## 2024-01-01 PROCEDURE — 6370000000 HC RX 637 (ALT 250 FOR IP): Performed by: STUDENT IN AN ORGANIZED HEALTH CARE EDUCATION/TRAINING PROGRAM

## 2024-01-01 PROCEDURE — 1100000000 HC RM PRIVATE

## 2024-01-01 PROCEDURE — 90945 DIALYSIS ONE EVALUATION: CPT

## 2024-01-01 PROCEDURE — 90935 HEMODIALYSIS ONE EVALUATION: CPT

## 2024-01-01 PROCEDURE — 36415 COLL VENOUS BLD VENIPUNCTURE: CPT

## 2024-01-01 PROCEDURE — 82140 ASSAY OF AMMONIA: CPT

## 2024-01-01 PROCEDURE — 99233 SBSQ HOSP IP/OBS HIGH 50: CPT

## 2024-01-01 PROCEDURE — 2580000003 HC RX 258: Performed by: STUDENT IN AN ORGANIZED HEALTH CARE EDUCATION/TRAINING PROGRAM

## 2024-01-01 PROCEDURE — 93005 ELECTROCARDIOGRAM TRACING: CPT | Performed by: STUDENT IN AN ORGANIZED HEALTH CARE EDUCATION/TRAINING PROGRAM

## 2024-01-01 PROCEDURE — 74176 CT ABD & PELVIS W/O CONTRAST: CPT

## 2024-01-01 PROCEDURE — 99238 HOSP IP/OBS DSCHRG MGMT 30/<: CPT | Performed by: FAMILY MEDICINE

## 2024-01-01 PROCEDURE — 2580000003 HC RX 258: Performed by: INTERNAL MEDICINE

## 2024-01-01 PROCEDURE — C9113 INJ PANTOPRAZOLE SODIUM, VIA: HCPCS | Performed by: STUDENT IN AN ORGANIZED HEALTH CARE EDUCATION/TRAINING PROGRAM

## 2024-01-01 PROCEDURE — 6370000000 HC RX 637 (ALT 250 FOR IP): Performed by: FAMILY MEDICINE

## 2024-01-01 PROCEDURE — 6360000002 HC RX W HCPCS: Performed by: FAMILY MEDICINE

## 2024-01-01 PROCEDURE — 85025 COMPLETE CBC W/AUTO DIFF WBC: CPT

## 2024-01-01 PROCEDURE — 2580000003 HC RX 258: Performed by: NURSE PRACTITIONER

## 2024-01-01 PROCEDURE — 0656 HSPC GENERAL INPATIENT

## 2024-01-01 PROCEDURE — 2000000000 HC ICU R&B

## 2024-01-01 PROCEDURE — 84484 ASSAY OF TROPONIN QUANT: CPT

## 2024-01-01 PROCEDURE — 2500000003 HC RX 250 WO HCPCS: Performed by: INTERNAL MEDICINE

## 2024-01-01 PROCEDURE — 99498 ADVNCD CARE PLAN ADDL 30 MIN: CPT | Performed by: NURSE PRACTITIONER

## 2024-01-01 PROCEDURE — 80202 ASSAY OF VANCOMYCIN: CPT

## 2024-01-01 PROCEDURE — 89050 BODY FLUID CELL COUNT: CPT

## 2024-01-01 PROCEDURE — 87070 CULTURE OTHR SPECIMN AEROBIC: CPT

## 2024-01-01 PROCEDURE — 82042 OTHER SOURCE ALBUMIN QUAN EA: CPT

## 2024-01-01 PROCEDURE — 99223 1ST HOSP IP/OBS HIGH 75: CPT | Performed by: NURSE PRACTITIONER

## 2024-01-01 PROCEDURE — 2500000003 HC RX 250 WO HCPCS: Performed by: NURSE PRACTITIONER

## 2024-01-01 PROCEDURE — 96361 HYDRATE IV INFUSION ADD-ON: CPT

## 2024-01-01 PROCEDURE — 82150 ASSAY OF AMYLASE: CPT

## 2024-01-01 PROCEDURE — 99222 1ST HOSP IP/OBS MODERATE 55: CPT | Performed by: FAMILY MEDICINE

## 2024-01-01 PROCEDURE — 96374 THER/PROPH/DIAG INJ IV PUSH: CPT

## 2024-01-01 PROCEDURE — 87205 SMEAR GRAM STAIN: CPT

## 2024-01-01 PROCEDURE — 84157 ASSAY OF PROTEIN OTHER: CPT

## 2024-01-01 PROCEDURE — 83605 ASSAY OF LACTIC ACID: CPT

## 2024-01-01 PROCEDURE — 82945 GLUCOSE OTHER FLUID: CPT

## 2024-01-01 PROCEDURE — 87015 SPECIMEN INFECT AGNT CONCNTJ: CPT

## 2024-01-01 PROCEDURE — 99223 1ST HOSP IP/OBS HIGH 75: CPT

## 2024-01-01 PROCEDURE — 6360000004 HC RX CONTRAST MEDICATION: Performed by: INTERNAL MEDICINE

## 2024-01-01 PROCEDURE — 2500000003 HC RX 250 WO HCPCS

## 2024-01-01 PROCEDURE — 74174 CTA ABD&PLVS W/CONTRAST: CPT

## 2024-01-01 PROCEDURE — 5A1D70Z PERFORMANCE OF URINARY FILTRATION, INTERMITTENT, LESS THAN 6 HOURS PER DAY: ICD-10-PCS | Performed by: INTERNAL MEDICINE

## 2024-01-01 PROCEDURE — 84100 ASSAY OF PHOSPHORUS: CPT

## 2024-01-01 PROCEDURE — 71045 X-RAY EXAM CHEST 1 VIEW: CPT

## 2024-01-01 PROCEDURE — 6360000002 HC RX W HCPCS

## 2024-01-01 PROCEDURE — 99233 SBSQ HOSP IP/OBS HIGH 50: CPT | Performed by: STUDENT IN AN ORGANIZED HEALTH CARE EDUCATION/TRAINING PROGRAM

## 2024-01-01 PROCEDURE — 99285 EMERGENCY DEPT VISIT HI MDM: CPT

## 2024-01-01 PROCEDURE — 93010 ELECTROCARDIOGRAM REPORT: CPT | Performed by: SPECIALIST

## 2024-01-01 PROCEDURE — 83880 ASSAY OF NATRIURETIC PEPTIDE: CPT

## 2024-01-01 PROCEDURE — 99497 ADVNCD CARE PLAN 30 MIN: CPT | Performed by: NURSE PRACTITIONER

## 2024-01-01 RX ORDER — INSULIN LISPRO 100 [IU]/ML
0-4 INJECTION, SOLUTION INTRAVENOUS; SUBCUTANEOUS NIGHTLY
Status: DISCONTINUED | OUTPATIENT
Start: 2024-01-01 | End: 2024-01-01

## 2024-01-01 RX ORDER — PANTOPRAZOLE SODIUM 40 MG/1
40 TABLET, DELAYED RELEASE ORAL
Status: DISCONTINUED | OUTPATIENT
Start: 2024-01-01 | End: 2024-01-01

## 2024-01-01 RX ORDER — GLYCOPYRROLATE 0.2 MG/ML
0.2 INJECTION INTRAMUSCULAR; INTRAVENOUS EVERY 4 HOURS PRN
Status: DISCONTINUED | OUTPATIENT
Start: 2024-01-01 | End: 2024-01-01 | Stop reason: HOSPADM

## 2024-01-01 RX ORDER — FENTANYL CITRATE 50 UG/ML
25 INJECTION, SOLUTION INTRAMUSCULAR; INTRAVENOUS ONCE
Status: DISCONTINUED | OUTPATIENT
Start: 2024-01-01 | End: 2024-01-01

## 2024-01-01 RX ORDER — WARFARIN SODIUM 2.5 MG/1
2.5 TABLET ORAL
COMMUNITY

## 2024-01-01 RX ORDER — HYDROMORPHONE HYDROCHLORIDE 2 MG/ML
3 INJECTION, SOLUTION INTRAMUSCULAR; INTRAVENOUS; SUBCUTANEOUS EVERY 10 MIN PRN
Status: DISCONTINUED | OUTPATIENT
Start: 2024-01-01 | End: 2024-01-01 | Stop reason: HOSPADM

## 2024-01-01 RX ORDER — POLYETHYLENE GLYCOL 3350 17 G/17G
17 POWDER, FOR SOLUTION ORAL DAILY PRN
Status: DISCONTINUED | OUTPATIENT
Start: 2024-01-01 | End: 2024-01-01 | Stop reason: HOSPADM

## 2024-01-01 RX ORDER — METRONIDAZOLE 500 MG/100ML
500 INJECTION, SOLUTION INTRAVENOUS ONCE
Status: DISCONTINUED | OUTPATIENT
Start: 2024-01-01 | End: 2024-01-01

## 2024-01-01 RX ORDER — ONDANSETRON 4 MG/1
4 TABLET, ORALLY DISINTEGRATING ORAL EVERY 8 HOURS PRN
Status: DISCONTINUED | OUTPATIENT
Start: 2024-01-01 | End: 2024-01-01 | Stop reason: HOSPADM

## 2024-01-01 RX ORDER — DIPHENHYDRAMINE HYDROCHLORIDE 50 MG/ML
50 INJECTION INTRAMUSCULAR; INTRAVENOUS ONCE
Status: COMPLETED | OUTPATIENT
Start: 2024-01-01 | End: 2024-01-01

## 2024-01-01 RX ORDER — ONDANSETRON 2 MG/ML
4 INJECTION INTRAMUSCULAR; INTRAVENOUS EVERY 6 HOURS PRN
Status: DISCONTINUED | OUTPATIENT
Start: 2024-01-01 | End: 2024-01-01 | Stop reason: HOSPADM

## 2024-01-01 RX ORDER — SODIUM CHLORIDE 0.9 % (FLUSH) 0.9 %
5-40 SYRINGE (ML) INJECTION EVERY 12 HOURS SCHEDULED
Status: DISCONTINUED | OUTPATIENT
Start: 2024-01-01 | End: 2024-01-01

## 2024-01-01 RX ORDER — INSULIN GLARGINE 100 [IU]/ML
5 INJECTION, SOLUTION SUBCUTANEOUS NIGHTLY
Status: DISCONTINUED | OUTPATIENT
Start: 2024-01-01 | End: 2024-01-01

## 2024-01-01 RX ORDER — MIDAZOLAM HYDROCHLORIDE 1 MG/ML
1 INJECTION INTRAMUSCULAR; INTRAVENOUS
Status: DISCONTINUED | OUTPATIENT
Start: 2024-01-01 | End: 2024-01-01 | Stop reason: HOSPADM

## 2024-01-01 RX ORDER — HYDROMORPHONE HYDROCHLORIDE 1 MG/ML
1 INJECTION, SOLUTION INTRAMUSCULAR; INTRAVENOUS; SUBCUTANEOUS EVERY 4 HOURS PRN
Status: DISCONTINUED | OUTPATIENT
Start: 2024-01-01 | End: 2024-01-01

## 2024-01-01 RX ORDER — SODIUM CHLORIDE, SODIUM LACTATE, CALCIUM CHLORIDE, MAGNESIUM CHLORIDE AND DEXTROSE 1.5; 538; 448; 18.3; 5.08 G/100ML; MG/100ML; MG/100ML; MG/100ML; MG/100ML
6000 INJECTION, SOLUTION INTRAPERITONEAL EVERY EVENING
Status: DISCONTINUED | OUTPATIENT
Start: 2024-01-01 | End: 2024-01-01

## 2024-01-01 RX ORDER — ACETAMINOPHEN 650 MG/1
650 SUPPOSITORY RECTAL EVERY 6 HOURS PRN
Status: DISCONTINUED | OUTPATIENT
Start: 2024-01-01 | End: 2024-01-01 | Stop reason: HOSPADM

## 2024-01-01 RX ORDER — MIDODRINE HYDROCHLORIDE 5 MG/1
15 TABLET ORAL ONCE
Status: COMPLETED | OUTPATIENT
Start: 2024-01-01 | End: 2024-01-01

## 2024-01-01 RX ORDER — SODIUM CHLORIDE 0.9 % (FLUSH) 0.9 %
5-40 SYRINGE (ML) INJECTION PRN
Status: DISCONTINUED | OUTPATIENT
Start: 2024-01-01 | End: 2024-01-01 | Stop reason: HOSPADM

## 2024-01-01 RX ORDER — DIAZEPAM 5 MG/ML
5 INJECTION, SOLUTION INTRAMUSCULAR; INTRAVENOUS
Status: DISCONTINUED | OUTPATIENT
Start: 2024-01-01 | End: 2024-01-01 | Stop reason: HOSPADM

## 2024-01-01 RX ORDER — GENTAMICIN SULFATE 1 MG/G
CREAM TOPICAL DAILY
Status: DISCONTINUED | OUTPATIENT
Start: 2024-01-01 | End: 2024-01-01

## 2024-01-01 RX ORDER — SODIUM CHLORIDE 9 MG/ML
INJECTION, SOLUTION INTRAVENOUS PRN
Status: DISCONTINUED | OUTPATIENT
Start: 2024-01-01 | End: 2024-01-01

## 2024-01-01 RX ORDER — LEVOFLOXACIN 5 MG/ML
500 INJECTION, SOLUTION INTRAVENOUS ONCE
Status: DISCONTINUED | OUTPATIENT
Start: 2024-01-01 | End: 2024-01-01

## 2024-01-01 RX ORDER — HYDROMORPHONE HYDROCHLORIDE 2 MG/ML
3 INJECTION, SOLUTION INTRAMUSCULAR; INTRAVENOUS; SUBCUTANEOUS EVERY 4 HOURS
Status: DISCONTINUED | OUTPATIENT
Start: 2024-01-01 | End: 2024-01-01 | Stop reason: HOSPADM

## 2024-01-01 RX ORDER — SIMETHICONE 80 MG
80 TABLET,CHEWABLE ORAL EVERY 6 HOURS PRN
Status: DISCONTINUED | OUTPATIENT
Start: 2024-01-01 | End: 2024-01-01 | Stop reason: HOSPADM

## 2024-01-01 RX ORDER — DOCUSATE SODIUM 100 MG/1
100 CAPSULE, LIQUID FILLED ORAL 2 TIMES DAILY PRN
Status: DISCONTINUED | OUTPATIENT
Start: 2024-01-01 | End: 2024-01-01 | Stop reason: HOSPADM

## 2024-01-01 RX ORDER — MIDODRINE HYDROCHLORIDE 5 MG/1
15 TABLET ORAL 3 TIMES DAILY
Status: DISCONTINUED | OUTPATIENT
Start: 2024-01-01 | End: 2024-01-01

## 2024-01-01 RX ORDER — HYDROMORPHONE HYDROCHLORIDE 1 MG/ML
1 INJECTION, SOLUTION INTRAMUSCULAR; INTRAVENOUS; SUBCUTANEOUS
Status: COMPLETED | OUTPATIENT
Start: 2024-01-01 | End: 2024-01-01

## 2024-01-01 RX ORDER — INSULIN LISPRO 100 [IU]/ML
0-8 INJECTION, SOLUTION INTRAVENOUS; SUBCUTANEOUS
Status: DISCONTINUED | OUTPATIENT
Start: 2024-01-01 | End: 2024-01-01

## 2024-01-01 RX ORDER — 0.9 % SODIUM CHLORIDE 0.9 %
1000 INTRAVENOUS SOLUTION INTRAVENOUS ONCE
Status: COMPLETED | OUTPATIENT
Start: 2024-01-01 | End: 2024-01-01

## 2024-01-01 RX ORDER — ATORVASTATIN CALCIUM 80 MG/1
80 TABLET, FILM COATED ORAL
Qty: 30 TABLET | Refills: 5 | Status: SHIPPED | OUTPATIENT
Start: 2024-01-01 | End: 2024-06-16

## 2024-01-01 RX ORDER — FLUDROCORTISONE ACETATE 0.1 MG/1
0.1 TABLET ORAL 2 TIMES DAILY
Status: DISCONTINUED | OUTPATIENT
Start: 2024-01-01 | End: 2024-01-01

## 2024-01-01 RX ORDER — DIAZEPAM 5 MG/ML
5 INJECTION, SOLUTION INTRAMUSCULAR; INTRAVENOUS EVERY 4 HOURS
Status: DISCONTINUED | OUTPATIENT
Start: 2024-01-01 | End: 2024-01-01 | Stop reason: HOSPADM

## 2024-01-01 RX ORDER — NOREPINEPHRINE BITARTRATE 0.06 MG/ML
1-100 INJECTION, SOLUTION INTRAVENOUS CONTINUOUS
Status: DISCONTINUED | OUTPATIENT
Start: 2024-01-01 | End: 2024-01-01

## 2024-01-01 RX ORDER — KETAMINE HYDROCHLORIDE 10 MG/ML
0.3 INJECTION, SOLUTION INTRAMUSCULAR; INTRAVENOUS ONCE
Status: COMPLETED | OUTPATIENT
Start: 2024-01-01 | End: 2024-01-01

## 2024-01-01 RX ORDER — BISACODYL 10 MG
10 SUPPOSITORY, RECTAL RECTAL DAILY PRN
Status: DISCONTINUED | OUTPATIENT
Start: 2024-01-01 | End: 2024-01-01 | Stop reason: HOSPADM

## 2024-01-01 RX ORDER — HYDROMORPHONE HYDROCHLORIDE 2 MG/ML
3 INJECTION, SOLUTION INTRAMUSCULAR; INTRAVENOUS; SUBCUTANEOUS
Status: DISCONTINUED | OUTPATIENT
Start: 2024-01-01 | End: 2024-01-01 | Stop reason: HOSPADM

## 2024-01-01 RX ORDER — SUCRALFATE 1 G/1
1 TABLET ORAL
Status: DISCONTINUED | OUTPATIENT
Start: 2024-01-01 | End: 2024-01-01 | Stop reason: HOSPADM

## 2024-01-01 RX ORDER — ALBUMIN (HUMAN) 12.5 G/50ML
25 SOLUTION INTRAVENOUS AS NEEDED
Status: DISCONTINUED | OUTPATIENT
Start: 2024-01-01 | End: 2024-01-01

## 2024-01-01 RX ORDER — FLUCONAZOLE 100 MG/1
100 TABLET ORAL DAILY
Status: DISCONTINUED | OUTPATIENT
Start: 2024-01-01 | End: 2024-01-01

## 2024-01-01 RX ORDER — ACETAMINOPHEN 325 MG/1
650 TABLET ORAL EVERY 6 HOURS PRN
Status: DISCONTINUED | OUTPATIENT
Start: 2024-01-01 | End: 2024-01-01 | Stop reason: HOSPADM

## 2024-01-01 RX ORDER — PROCHLORPERAZINE EDISYLATE 5 MG/ML
5 INJECTION INTRAMUSCULAR; INTRAVENOUS ONCE
Status: COMPLETED | OUTPATIENT
Start: 2024-01-01 | End: 2024-01-01

## 2024-01-01 RX ORDER — INSULIN LISPRO 100 [IU]/ML
0-8 INJECTION, SOLUTION INTRAVENOUS; SUBCUTANEOUS EVERY 6 HOURS
Status: DISCONTINUED | OUTPATIENT
Start: 2024-01-01 | End: 2024-01-01

## 2024-01-01 RX ORDER — DEXTROSE MONOHYDRATE 100 MG/ML
INJECTION, SOLUTION INTRAVENOUS CONTINUOUS PRN
Status: DISCONTINUED | OUTPATIENT
Start: 2024-01-01 | End: 2024-01-01

## 2024-01-01 RX ORDER — HYDROMORPHONE HYDROCHLORIDE 2 MG/ML
2 INJECTION, SOLUTION INTRAMUSCULAR; INTRAVENOUS; SUBCUTANEOUS EVERY 10 MIN PRN
Status: DISCONTINUED | OUTPATIENT
Start: 2024-01-01 | End: 2024-01-01

## 2024-01-01 RX ORDER — WARFARIN SODIUM 1 MG/1
1 TABLET ORAL
COMMUNITY

## 2024-01-01 RX ADMIN — HYDROMORPHONE HYDROCHLORIDE 3 MG: 2 INJECTION, SOLUTION INTRAMUSCULAR; INTRAVENOUS; SUBCUTANEOUS at 12:57

## 2024-01-01 RX ADMIN — DIAZEPAM 5 MG: 10 INJECTION, SOLUTION INTRAMUSCULAR; INTRAVENOUS at 00:44

## 2024-01-01 RX ADMIN — DOCUSATE SODIUM 100 MG: 100 CAPSULE, LIQUID FILLED ORAL at 21:29

## 2024-01-01 RX ADMIN — HYDROMORPHONE HYDROCHLORIDE 2 MG: 2 INJECTION, SOLUTION INTRAMUSCULAR; INTRAVENOUS; SUBCUTANEOUS at 18:19

## 2024-01-01 RX ADMIN — GENTAMICIN SULFATE: 1 CREAM TOPICAL at 00:30

## 2024-01-01 RX ADMIN — HYDROMORPHONE HYDROCHLORIDE 2 MG: 2 INJECTION, SOLUTION INTRAMUSCULAR; INTRAVENOUS; SUBCUTANEOUS at 12:34

## 2024-01-01 RX ADMIN — FLUDROCORTISONE ACETATE 0.1 MG: 0.1 TABLET ORAL at 08:19

## 2024-01-01 RX ADMIN — HYDROCORTISONE SODIUM SUCCINATE 50 MG: 100 INJECTION, POWDER, FOR SOLUTION INTRAMUSCULAR; INTRAVENOUS at 12:19

## 2024-01-01 RX ADMIN — HYDROMORPHONE HYDROCHLORIDE 0.5 MG: 1 INJECTION, SOLUTION INTRAMUSCULAR; INTRAVENOUS; SUBCUTANEOUS at 05:36

## 2024-01-01 RX ADMIN — SODIUM CHLORIDE, SODIUM LACTATE, CALCIUM CHLORIDE, MAGNESIUM CHLORIDE AND DEXTROSE 6000 ML: 1.5; 538; 448; 18.3; 5.08 INJECTION, SOLUTION INTRAPERITONEAL at 00:20

## 2024-01-01 RX ADMIN — PIPERACILLIN AND TAZOBACTAM 3375 MG: 3; .375 INJECTION, POWDER, LYOPHILIZED, FOR SOLUTION INTRAVENOUS at 03:47

## 2024-01-01 RX ADMIN — HYDROCORTISONE SODIUM SUCCINATE 50 MG: 100 INJECTION, POWDER, FOR SOLUTION INTRAMUSCULAR; INTRAVENOUS at 04:06

## 2024-01-01 RX ADMIN — GENTAMICIN SULFATE: 1 CREAM TOPICAL at 08:50

## 2024-01-01 RX ADMIN — ONDANSETRON 4 MG: 2 INJECTION INTRAMUSCULAR; INTRAVENOUS at 05:42

## 2024-01-01 RX ADMIN — SODIUM CHLORIDE, PRESERVATIVE FREE 10 ML: 5 INJECTION INTRAVENOUS at 08:26

## 2024-01-01 RX ADMIN — FLUCONAZOLE 100 MG: 100 TABLET ORAL at 08:20

## 2024-01-01 RX ADMIN — SODIUM CHLORIDE, PRESERVATIVE FREE 10 ML: 5 INJECTION INTRAVENOUS at 19:47

## 2024-01-01 RX ADMIN — FLUCONAZOLE 100 MG: 100 TABLET ORAL at 08:21

## 2024-01-01 RX ADMIN — SODIUM CHLORIDE, SODIUM LACTATE, CALCIUM CHLORIDE, MAGNESIUM CHLORIDE AND DEXTROSE 6000 ML: 1.5; 538; 448; 18.3; 5.08 INJECTION, SOLUTION INTRAPERITONEAL at 23:30

## 2024-01-01 RX ADMIN — SODIUM CHLORIDE, PRESERVATIVE FREE 10 ML: 5 INJECTION INTRAVENOUS at 08:50

## 2024-01-01 RX ADMIN — MIDODRINE HYDROCHLORIDE 15 MG: 5 TABLET ORAL at 15:25

## 2024-01-01 RX ADMIN — DIAZEPAM 5 MG: 10 INJECTION, SOLUTION INTRAMUSCULAR; INTRAVENOUS at 12:33

## 2024-01-01 RX ADMIN — HYDROMORPHONE HYDROCHLORIDE 3 MG: 2 INJECTION, SOLUTION INTRAMUSCULAR; INTRAVENOUS; SUBCUTANEOUS at 10:39

## 2024-01-01 RX ADMIN — SODIUM CHLORIDE 1000 ML: 9 INJECTION, SOLUTION INTRAVENOUS at 14:00

## 2024-01-01 RX ADMIN — PIPERACILLIN AND TAZOBACTAM 3375 MG: 3; .375 INJECTION, POWDER, LYOPHILIZED, FOR SOLUTION INTRAVENOUS at 02:50

## 2024-01-01 RX ADMIN — HYDROMORPHONE HYDROCHLORIDE 0.5 MG: 1 INJECTION, SOLUTION INTRAMUSCULAR; INTRAVENOUS; SUBCUTANEOUS at 19:38

## 2024-01-01 RX ADMIN — PROCHLORPERAZINE EDISYLATE 5 MG: 5 INJECTION INTRAMUSCULAR; INTRAVENOUS at 14:43

## 2024-01-01 RX ADMIN — MIDODRINE HYDROCHLORIDE 15 MG: 5 TABLET ORAL at 16:55

## 2024-01-01 RX ADMIN — WATER 40 MG: 1 INJECTION INTRAMUSCULAR; INTRAVENOUS; SUBCUTANEOUS at 12:51

## 2024-01-01 RX ADMIN — MIDODRINE HYDROCHLORIDE 15 MG: 5 TABLET ORAL at 08:19

## 2024-01-01 RX ADMIN — HYDROMORPHONE HYDROCHLORIDE 1 MG: 1 INJECTION, SOLUTION INTRAMUSCULAR; INTRAVENOUS; SUBCUTANEOUS at 11:30

## 2024-01-01 RX ADMIN — HYDROMORPHONE HYDROCHLORIDE 0.5 MG: 1 INJECTION, SOLUTION INTRAMUSCULAR; INTRAVENOUS; SUBCUTANEOUS at 06:06

## 2024-01-01 RX ADMIN — DIAZEPAM 5 MG: 10 INJECTION, SOLUTION INTRAMUSCULAR; INTRAVENOUS at 10:42

## 2024-01-01 RX ADMIN — HYDROMORPHONE HYDROCHLORIDE 3 MG: 2 INJECTION, SOLUTION INTRAMUSCULAR; INTRAVENOUS; SUBCUTANEOUS at 06:02

## 2024-01-01 RX ADMIN — HYDROMORPHONE HYDROCHLORIDE 3 MG: 2 INJECTION, SOLUTION INTRAMUSCULAR; INTRAVENOUS; SUBCUTANEOUS at 15:59

## 2024-01-01 RX ADMIN — MIDODRINE HYDROCHLORIDE 15 MG: 5 TABLET ORAL at 12:51

## 2024-01-01 RX ADMIN — SODIUM CHLORIDE, PRESERVATIVE FREE 10 ML: 5 INJECTION INTRAVENOUS at 08:12

## 2024-01-01 RX ADMIN — MIDODRINE HYDROCHLORIDE 15 MG: 5 TABLET ORAL at 08:49

## 2024-01-01 RX ADMIN — WATER 40 MG: 1 INJECTION INTRAMUSCULAR; INTRAVENOUS; SUBCUTANEOUS at 09:29

## 2024-01-01 RX ADMIN — HYDROMORPHONE HYDROCHLORIDE 2 MG: 2 INJECTION, SOLUTION INTRAMUSCULAR; INTRAVENOUS; SUBCUTANEOUS at 22:35

## 2024-01-01 RX ADMIN — FLUDROCORTISONE ACETATE 0.1 MG: 0.1 TABLET ORAL at 19:38

## 2024-01-01 RX ADMIN — HYDROMORPHONE HYDROCHLORIDE 2 MG: 2 INJECTION, SOLUTION INTRAMUSCULAR; INTRAVENOUS; SUBCUTANEOUS at 07:59

## 2024-01-01 RX ADMIN — DIPHENHYDRAMINE HYDROCHLORIDE 50 MG: 50 INJECTION INTRAMUSCULAR; INTRAVENOUS at 12:50

## 2024-01-01 RX ADMIN — Medication 2500 MG: at 16:07

## 2024-01-01 RX ADMIN — PIPERACILLIN AND TAZOBACTAM 3375 MG: 3; .375 INJECTION, POWDER, LYOPHILIZED, FOR SOLUTION INTRAVENOUS at 14:49

## 2024-01-01 RX ADMIN — FLUDROCORTISONE ACETATE 0.1 MG: 0.1 TABLET ORAL at 08:49

## 2024-01-01 RX ADMIN — GENTAMICIN SULFATE: 1 CREAM TOPICAL at 08:31

## 2024-01-01 RX ADMIN — PIPERACILLIN AND TAZOBACTAM 4500 MG: 4; .5 INJECTION, POWDER, LYOPHILIZED, FOR SOLUTION INTRAVENOUS; PARENTERAL at 14:48

## 2024-01-01 RX ADMIN — HYDROMORPHONE HYDROCHLORIDE 0.5 MG: 1 INJECTION, SOLUTION INTRAMUSCULAR; INTRAVENOUS; SUBCUTANEOUS at 16:56

## 2024-01-01 RX ADMIN — HYDROMORPHONE HYDROCHLORIDE 1 MG: 1 INJECTION, SOLUTION INTRAMUSCULAR; INTRAVENOUS; SUBCUTANEOUS at 10:15

## 2024-01-01 RX ADMIN — ONDANSETRON 4 MG: 2 INJECTION INTRAMUSCULAR; INTRAVENOUS at 02:04

## 2024-01-01 RX ADMIN — INSULIN LISPRO 2 UNITS: 100 INJECTION, SOLUTION INTRAVENOUS; SUBCUTANEOUS at 18:07

## 2024-01-01 RX ADMIN — ONDANSETRON 4 MG: 2 INJECTION INTRAMUSCULAR; INTRAVENOUS at 19:44

## 2024-01-01 RX ADMIN — KETAMINE HYDROCHLORIDE 40.9 MG: 10 INJECTION, SOLUTION INTRAMUSCULAR; INTRAVENOUS at 16:18

## 2024-01-01 RX ADMIN — PANTOPRAZOLE SODIUM 40 MG: 40 TABLET, DELAYED RELEASE ORAL at 06:03

## 2024-01-01 RX ADMIN — MIDODRINE HYDROCHLORIDE 15 MG: 5 TABLET ORAL at 08:21

## 2024-01-01 RX ADMIN — SODIUM CHLORIDE, PRESERVATIVE FREE 10 ML: 5 INJECTION INTRAVENOUS at 20:23

## 2024-01-01 RX ADMIN — ONDANSETRON 4 MG: 2 INJECTION INTRAMUSCULAR; INTRAVENOUS at 08:25

## 2024-01-01 RX ADMIN — HYDROCORTISONE SODIUM SUCCINATE 50 MG: 100 INJECTION, POWDER, FOR SOLUTION INTRAMUSCULAR; INTRAVENOUS at 19:38

## 2024-01-01 RX ADMIN — HYDROCORTISONE SODIUM SUCCINATE 100 MG: 100 INJECTION, POWDER, FOR SOLUTION INTRAMUSCULAR; INTRAVENOUS at 15:30

## 2024-01-01 RX ADMIN — HYDROCORTISONE SODIUM SUCCINATE 50 MG: 100 INJECTION, POWDER, FOR SOLUTION INTRAMUSCULAR; INTRAVENOUS at 05:36

## 2024-01-01 RX ADMIN — HYDROMORPHONE HYDROCHLORIDE 0.5 MG: 1 INJECTION, SOLUTION INTRAMUSCULAR; INTRAVENOUS; SUBCUTANEOUS at 02:04

## 2024-01-01 RX ADMIN — DIAZEPAM 5 MG: 10 INJECTION, SOLUTION INTRAMUSCULAR; INTRAVENOUS at 12:57

## 2024-01-01 RX ADMIN — SODIUM CHLORIDE, PRESERVATIVE FREE 10 ML: 5 INJECTION INTRAVENOUS at 21:30

## 2024-01-01 RX ADMIN — ALBUMIN (HUMAN) 25 G: 0.25 INJECTION, SOLUTION INTRAVENOUS at 09:38

## 2024-01-01 RX ADMIN — DIAZEPAM 5 MG: 10 INJECTION, SOLUTION INTRAMUSCULAR; INTRAVENOUS at 03:41

## 2024-01-01 RX ADMIN — HYDROMORPHONE HYDROCHLORIDE 0.5 MG: 1 INJECTION, SOLUTION INTRAMUSCULAR; INTRAVENOUS; SUBCUTANEOUS at 17:22

## 2024-01-01 RX ADMIN — HYDROMORPHONE HYDROCHLORIDE 0.5 MG: 1 INJECTION, SOLUTION INTRAMUSCULAR; INTRAVENOUS; SUBCUTANEOUS at 08:26

## 2024-01-01 RX ADMIN — HYDROMORPHONE HYDROCHLORIDE 3 MG: 2 INJECTION, SOLUTION INTRAMUSCULAR; INTRAVENOUS; SUBCUTANEOUS at 20:22

## 2024-01-01 RX ADMIN — FLUCONAZOLE 100 MG: 100 TABLET ORAL at 08:49

## 2024-01-01 RX ADMIN — DIAZEPAM 5 MG: 10 INJECTION, SOLUTION INTRAMUSCULAR; INTRAVENOUS at 18:20

## 2024-01-01 RX ADMIN — PANTOPRAZOLE SODIUM 40 MG: 40 INJECTION, POWDER, FOR SOLUTION INTRAVENOUS at 07:59

## 2024-01-01 RX ADMIN — FLUDROCORTISONE ACETATE 0.1 MG: 0.1 TABLET ORAL at 08:21

## 2024-01-01 RX ADMIN — HYDROMORPHONE HYDROCHLORIDE 2 MG: 2 INJECTION, SOLUTION INTRAMUSCULAR; INTRAVENOUS; SUBCUTANEOUS at 00:31

## 2024-01-01 RX ADMIN — DIAZEPAM 5 MG: 10 INJECTION, SOLUTION INTRAMUSCULAR; INTRAVENOUS at 07:56

## 2024-01-01 RX ADMIN — HYDROMORPHONE HYDROCHLORIDE 1 MG: 1 INJECTION, SOLUTION INTRAMUSCULAR; INTRAVENOUS; SUBCUTANEOUS at 11:10

## 2024-01-01 RX ADMIN — HYDROMORPHONE HYDROCHLORIDE 3 MG: 2 INJECTION, SOLUTION INTRAMUSCULAR; INTRAVENOUS; SUBCUTANEOUS at 07:56

## 2024-01-01 RX ADMIN — FLUDROCORTISONE ACETATE 0.1 MG: 0.1 TABLET ORAL at 20:23

## 2024-01-01 RX ADMIN — PANTOPRAZOLE SODIUM 40 MG: 40 INJECTION, POWDER, FOR SOLUTION INTRAVENOUS at 08:25

## 2024-01-01 RX ADMIN — PANTOPRAZOLE SODIUM 40 MG: 40 INJECTION, POWDER, FOR SOLUTION INTRAVENOUS at 21:53

## 2024-01-01 RX ADMIN — HYDROCORTISONE SODIUM SUCCINATE 50 MG: 100 INJECTION, POWDER, FOR SOLUTION INTRAMUSCULAR; INTRAVENOUS at 00:23

## 2024-01-01 RX ADMIN — HYDROMORPHONE HYDROCHLORIDE 0.5 MG: 1 INJECTION, SOLUTION INTRAMUSCULAR; INTRAVENOUS; SUBCUTANEOUS at 21:30

## 2024-01-01 RX ADMIN — SODIUM CHLORIDE 5 MCG/MIN: 9 INJECTION, SOLUTION INTRAVENOUS at 16:13

## 2024-01-01 RX ADMIN — HYDROMORPHONE HYDROCHLORIDE 3 MG: 2 INJECTION, SOLUTION INTRAMUSCULAR; INTRAVENOUS; SUBCUTANEOUS at 03:41

## 2024-01-01 RX ADMIN — PANTOPRAZOLE SODIUM 40 MG: 40 INJECTION, POWDER, FOR SOLUTION INTRAVENOUS at 21:30

## 2024-01-01 RX ADMIN — MIDODRINE HYDROCHLORIDE 15 MG: 5 TABLET ORAL at 17:24

## 2024-01-01 RX ADMIN — HYDROCORTISONE SODIUM SUCCINATE 50 MG: 100 INJECTION, POWDER, FOR SOLUTION INTRAMUSCULAR; INTRAVENOUS at 16:56

## 2024-01-01 RX ADMIN — PIPERACILLIN AND TAZOBACTAM 3375 MG: 3; .375 INJECTION, POWDER, LYOPHILIZED, FOR SOLUTION INTRAVENOUS at 04:05

## 2024-01-01 RX ADMIN — HYDROCORTISONE SODIUM SUCCINATE 50 MG: 100 INJECTION, POWDER, FOR SOLUTION INTRAMUSCULAR; INTRAVENOUS at 17:20

## 2024-01-01 RX ADMIN — FLUDROCORTISONE ACETATE 0.1 MG: 0.1 TABLET ORAL at 21:29

## 2024-01-01 RX ADMIN — INSULIN LISPRO 4 UNITS: 100 INJECTION, SOLUTION INTRAVENOUS; SUBCUTANEOUS at 12:19

## 2024-01-01 RX ADMIN — GENTAMICIN SULFATE: 1 CREAM TOPICAL at 23:15

## 2024-01-01 RX ADMIN — HYDROCORTISONE SODIUM SUCCINATE 50 MG: 100 INJECTION, POWDER, FOR SOLUTION INTRAMUSCULAR; INTRAVENOUS at 01:09

## 2024-01-01 RX ADMIN — DIATRIZOATE MEGLUMINE AND DIATRIZOATE SODIUM 30 ML: 660; 100 LIQUID ORAL; RECTAL at 11:31

## 2024-01-01 RX ADMIN — PIPERACILLIN AND TAZOBACTAM 3375 MG: 3; .375 INJECTION, POWDER, LYOPHILIZED, FOR SOLUTION INTRAVENOUS at 15:18

## 2024-01-01 RX ADMIN — DIAZEPAM 5 MG: 10 INJECTION, SOLUTION INTRAMUSCULAR; INTRAVENOUS at 20:23

## 2024-01-01 RX ADMIN — SODIUM CHLORIDE 5 MCG/MIN: 9 INJECTION, SOLUTION INTRAVENOUS at 09:01

## 2024-01-01 RX ADMIN — IOPAMIDOL 100 ML: 755 INJECTION, SOLUTION INTRAVENOUS at 14:17

## 2024-01-01 RX ADMIN — HYDROCORTISONE SODIUM SUCCINATE 50 MG: 100 INJECTION, POWDER, FOR SOLUTION INTRAMUSCULAR; INTRAVENOUS at 01:56

## 2024-01-01 RX ADMIN — HYDROCORTISONE SODIUM SUCCINATE 50 MG: 100 INJECTION, POWDER, FOR SOLUTION INTRAMUSCULAR; INTRAVENOUS at 05:11

## 2024-01-01 RX ADMIN — PANTOPRAZOLE SODIUM 40 MG: 40 INJECTION, POWDER, FOR SOLUTION INTRAVENOUS at 08:49

## 2024-01-01 RX ADMIN — ONDANSETRON 4 MG: 2 INJECTION INTRAMUSCULAR; INTRAVENOUS at 06:17

## 2024-01-01 ASSESSMENT — PAIN DESCRIPTION - ORIENTATION
ORIENTATION: MID;UPPER;LOWER
ORIENTATION: OTHER (COMMENT)
ORIENTATION: ANTERIOR;MID
ORIENTATION: OTHER (COMMENT)
ORIENTATION: OTHER (COMMENT)
ORIENTATION: ANTERIOR
ORIENTATION: OTHER (COMMENT)
ORIENTATION: ANTERIOR
ORIENTATION: ANTERIOR;MID
ORIENTATION: OTHER (COMMENT)

## 2024-01-01 ASSESSMENT — PAIN DESCRIPTION - LOCATION
LOCATION: GENERALIZED
LOCATION: GENERALIZED
LOCATION: ABDOMEN
LOCATION: GENERALIZED
LOCATION: GENERALIZED
LOCATION: ABDOMEN
LOCATION: GENERALIZED
LOCATION: ABDOMEN
LOCATION: GENERALIZED
LOCATION: ABDOMEN
LOCATION: GENERALIZED
LOCATION: ABDOMEN
LOCATION: GENERALIZED
LOCATION: GENERALIZED
LOCATION: ABDOMEN
LOCATION: GENERALIZED
LOCATION: GENERALIZED
LOCATION: ABDOMEN
LOCATION: ABDOMEN
LOCATION: GENERALIZED

## 2024-01-01 ASSESSMENT — PAIN SCALES - GENERAL
PAINLEVEL_OUTOF10: 10
PAINLEVEL_OUTOF10: 6
PAINLEVEL_OUTOF10: 10
PAINLEVEL_OUTOF10: 6
PAINLEVEL_OUTOF10: 10
PAINLEVEL_OUTOF10: 0
PAINLEVEL_OUTOF10: 8
PAINLEVEL_OUTOF10: 10
PAINLEVEL_OUTOF10: 6
PAINLEVEL_OUTOF10: 8
PAINLEVEL_OUTOF10: 10
PAINLEVEL_OUTOF10: 0
PAINLEVEL_OUTOF10: 7
PAINLEVEL_OUTOF10: 8
PAINLEVEL_OUTOF10: 10
PAINLEVEL_OUTOF10: 10
PAINLEVEL_OUTOF10: 6
PAINLEVEL_OUTOF10: 0
PAINLEVEL_OUTOF10: 6
PAINLEVEL_OUTOF10: 0
PAINLEVEL_OUTOF10: 8
PAINLEVEL_OUTOF10: 3
PAINLEVEL_OUTOF10: 5
PAINLEVEL_OUTOF10: 10
PAINLEVEL_OUTOF10: 0
PAINLEVEL_OUTOF10: 0
PAINLEVEL_OUTOF10: 6
PAINLEVEL_OUTOF10: 6
PAINLEVEL_OUTOF10: 10
PAINLEVEL_OUTOF10: 0
PAINLEVEL_OUTOF10: 6

## 2024-01-01 ASSESSMENT — PAIN DESCRIPTION - DESCRIPTORS
DESCRIPTORS: ACHING
DESCRIPTORS: PATIENT UNABLE TO DESCRIBE
DESCRIPTORS: ACHING;SHARP
DESCRIPTORS: ACHING;CRAMPING
DESCRIPTORS: ACHING
DESCRIPTORS: ACHING
DESCRIPTORS: ACHING;SHARP
DESCRIPTORS: PATIENT UNABLE TO DESCRIBE
DESCRIPTORS: ACHING;CRAMPING
DESCRIPTORS: ACHING
DESCRIPTORS: ACHING;SHARP
DESCRIPTORS: ACHING
DESCRIPTORS: ACHING

## 2024-01-01 ASSESSMENT — ENCOUNTER SYMPTOMS
SHORTNESS OF BREATH: 1
NAUSEA: 0
CHEST TIGHTNESS: 0
SHORTNESS OF BREATH: 0
COUGH: 0
VOMITING: 1
CONSTIPATION: 1
ABDOMINAL PAIN: 1
ANAL BLEEDING: 0
ABDOMINAL PAIN: 1
VOMITING: 0
NAUSEA: 1

## 2024-01-01 ASSESSMENT — PAIN - FUNCTIONAL ASSESSMENT
PAIN_FUNCTIONAL_ASSESSMENT: 0-10
PAIN_FUNCTIONAL_ASSESSMENT: PREVENTS OR INTERFERES WITH MANY ACTIVE NOT PASSIVE ACTIVITIES

## 2024-01-01 ASSESSMENT — PAIN DESCRIPTION - ONSET
ONSET: ON-GOING
ONSET: ON-GOING

## 2024-01-01 ASSESSMENT — PAIN DESCRIPTION - FREQUENCY
FREQUENCY: CONTINUOUS
FREQUENCY: CONTINUOUS

## 2024-01-01 ASSESSMENT — PAIN DESCRIPTION - PAIN TYPE
TYPE: CHRONIC PAIN
TYPE: ACUTE PAIN

## 2024-01-10 ENCOUNTER — OFFICE VISIT (OUTPATIENT)
Age: 69
End: 2024-01-10
Payer: MEDICARE

## 2024-01-10 VITALS
OXYGEN SATURATION: 95 % | BODY MASS INDEX: 40.37 KG/M2 | WEIGHT: 282 LBS | DIASTOLIC BLOOD PRESSURE: 79 MMHG | TEMPERATURE: 98.2 F | SYSTOLIC BLOOD PRESSURE: 146 MMHG | HEIGHT: 70 IN | RESPIRATION RATE: 16 BRPM | HEART RATE: 68 BPM

## 2024-01-10 DIAGNOSIS — N18.6 END STAGE RENAL DISEASE (HCC): ICD-10-CM

## 2024-01-10 DIAGNOSIS — R07.9 CHEST PAIN, UNSPECIFIED TYPE: ICD-10-CM

## 2024-01-10 DIAGNOSIS — Z79.01 WARFARIN ANTICOAGULATION: Primary | ICD-10-CM

## 2024-01-10 DIAGNOSIS — I26.99 OTHER PULMONARY EMBOLISM WITHOUT ACUTE COR PULMONALE, UNSPECIFIED CHRONICITY (HCC): ICD-10-CM

## 2024-01-10 DIAGNOSIS — Z99.2 DEPENDENCE ON RENAL DIALYSIS (HCC): ICD-10-CM

## 2024-01-10 DIAGNOSIS — Z51.81 MEDICATION MONITORING ENCOUNTER: ICD-10-CM

## 2024-01-10 DIAGNOSIS — M15.9 PRIMARY OSTEOARTHRITIS INVOLVING MULTIPLE JOINTS: ICD-10-CM

## 2024-01-10 DIAGNOSIS — E66.01 OBESITY, CLASS III, BMI 40-49.9 (MORBID OBESITY) (HCC): ICD-10-CM

## 2024-01-10 DIAGNOSIS — G89.29 CHRONIC PAIN OF RIGHT KNEE: ICD-10-CM

## 2024-01-10 DIAGNOSIS — R07.89 OTHER CHEST PAIN: ICD-10-CM

## 2024-01-10 DIAGNOSIS — G89.29 CHRONIC RIGHT SHOULDER PAIN: ICD-10-CM

## 2024-01-10 DIAGNOSIS — M25.511 CHRONIC RIGHT SHOULDER PAIN: ICD-10-CM

## 2024-01-10 DIAGNOSIS — L98.491 NON-PRESSURE CHRONIC ULCER OF SKIN OF OTHER SITES LIMITED TO BREAKDOWN OF SKIN (HCC): ICD-10-CM

## 2024-01-10 DIAGNOSIS — M25.561 CHRONIC PAIN OF RIGHT KNEE: ICD-10-CM

## 2024-01-10 LAB
POC INR: 2.1
PROTHROMBIN TIME, POC: 25.1

## 2024-01-10 PROCEDURE — 99214 OFFICE O/P EST MOD 30 MIN: CPT | Performed by: FAMILY MEDICINE

## 2024-01-10 PROCEDURE — 85610 PROTHROMBIN TIME: CPT | Performed by: FAMILY MEDICINE

## 2024-01-10 PROCEDURE — 3077F SYST BP >= 140 MM HG: CPT | Performed by: FAMILY MEDICINE

## 2024-01-10 PROCEDURE — 3078F DIAST BP <80 MM HG: CPT | Performed by: FAMILY MEDICINE

## 2024-01-10 PROCEDURE — 1090F PRES/ABSN URINE INCON ASSESS: CPT | Performed by: FAMILY MEDICINE

## 2024-01-10 PROCEDURE — 1036F TOBACCO NON-USER: CPT | Performed by: FAMILY MEDICINE

## 2024-01-10 PROCEDURE — PBSHW AMB POC PT/INR: Performed by: FAMILY MEDICINE

## 2024-01-10 PROCEDURE — G8484 FLU IMMUNIZE NO ADMIN: HCPCS | Performed by: FAMILY MEDICINE

## 2024-01-10 PROCEDURE — 1123F ACP DISCUSS/DSCN MKR DOCD: CPT | Performed by: FAMILY MEDICINE

## 2024-01-10 PROCEDURE — G8400 PT W/DXA NO RESULTS DOC: HCPCS | Performed by: FAMILY MEDICINE

## 2024-01-10 PROCEDURE — G8427 DOCREV CUR MEDS BY ELIG CLIN: HCPCS | Performed by: FAMILY MEDICINE

## 2024-01-10 PROCEDURE — 3017F COLORECTAL CA SCREEN DOC REV: CPT | Performed by: FAMILY MEDICINE

## 2024-01-10 PROCEDURE — G8417 CALC BMI ABV UP PARAM F/U: HCPCS | Performed by: FAMILY MEDICINE

## 2024-01-10 RX ORDER — OXYCODONE AND ACETAMINOPHEN 10; 325 MG/1; MG/1
1 TABLET ORAL EVERY 8 HOURS PRN
Qty: 90 TABLET | Refills: 0 | Status: SHIPPED | OUTPATIENT
Start: 2024-02-28 | End: 2024-03-29

## 2024-01-10 RX ORDER — OXYCODONE AND ACETAMINOPHEN 10; 325 MG/1; MG/1
1 TABLET ORAL EVERY 8 HOURS PRN
Qty: 90 TABLET | Refills: 0 | Status: SHIPPED | OUTPATIENT
Start: 2024-01-30 | End: 2024-02-29

## 2024-01-10 ASSESSMENT — PATIENT HEALTH QUESTIONNAIRE - PHQ9
9. THOUGHTS THAT YOU WOULD BE BETTER OFF DEAD, OR OF HURTING YOURSELF: 0
8. MOVING OR SPEAKING SO SLOWLY THAT OTHER PEOPLE COULD HAVE NOTICED. OR THE OPPOSITE, BEING SO FIGETY OR RESTLESS THAT YOU HAVE BEEN MOVING AROUND A LOT MORE THAN USUAL: 0
1. LITTLE INTEREST OR PLEASURE IN DOING THINGS: 0
4. FEELING TIRED OR HAVING LITTLE ENERGY: 0
SUM OF ALL RESPONSES TO PHQ QUESTIONS 1-9: 0
SUM OF ALL RESPONSES TO PHQ QUESTIONS 1-9: 0
SUM OF ALL RESPONSES TO PHQ9 QUESTIONS 1 & 2: 0
SUM OF ALL RESPONSES TO PHQ QUESTIONS 1-9: 0
6. FEELING BAD ABOUT YOURSELF - OR THAT YOU ARE A FAILURE OR HAVE LET YOURSELF OR YOUR FAMILY DOWN: 0
3. TROUBLE FALLING OR STAYING ASLEEP: 0
SUM OF ALL RESPONSES TO PHQ QUESTIONS 1-9: 0
2. FEELING DOWN, DEPRESSED OR HOPELESS: 0
10. IF YOU CHECKED OFF ANY PROBLEMS, HOW DIFFICULT HAVE THESE PROBLEMS MADE IT FOR YOU TO DO YOUR WORK, TAKE CARE OF THINGS AT HOME, OR GET ALONG WITH OTHER PEOPLE: 0
5. POOR APPETITE OR OVEREATING: 0
7. TROUBLE CONCENTRATING ON THINGS, SUCH AS READING THE NEWSPAPER OR WATCHING TELEVISION: 0

## 2024-01-10 NOTE — PATIENT INSTRUCTIONS
professional. [employer] disclaims any warranty or liability for your use of this information.

## 2024-01-10 NOTE — PROGRESS NOTES
Progress Note    she is a 68 y.o. year old female who presents for evaluation.    Subjective:     Patient here for INR check currently at goal.  No issues with bleeding.She feels like there is something moving around in her right ear.      Filled last Rx for pain medication.  Used for chronic chest and orthopedic pain.  Causes meaningful reduction of pain.  No hx of abuse or misuse needs toxicology.   checked.  Last Rx was last night  Reviewed PmHx, RxHx, FmHx, SocHx, AllgHx and updated and dated in the chart.    Review of Systems - negative except as listed above in the HPI    Objective:     Vitals:    01/10/24 1510   BP: (!) 146/79   Site: Right Upper Arm   Position: Sitting   Pulse: 68   Resp: 16   Temp: 98.2 °F (36.8 °C)   TempSrc: Oral   SpO2: 95%   Weight: 127.9 kg (282 lb)   Height: 1.778 m (5' 10\")       Current Outpatient Medications   Medication Sig    [START ON 2/28/2024] oxyCODONE-acetaminophen (PERCOCET)  MG per tablet Take 1 tablet by mouth every 8 hours as needed for Pain for up to 30 days. Max Daily Amount: 3 tablets    [START ON 1/30/2024] oxyCODONE-acetaminophen (PERCOCET)  MG per tablet Take 1 tablet by mouth every 8 hours as needed for Pain for up to 30 days. Intended supply: 30 days Max Daily Amount: 3 tablets    warfarin (COUMADIN) 1 MG tablet TAKE 1 MG TABLET BY MOUTH ONCE DAILY EVERY day except take 2 MG ON Wednesday and Friday    methocarbamol (ROBAXIN) 500 MG tablet Take 1 tablet by mouth 2 times daily as needed (shoulder pain)    oxyCODONE-acetaminophen (PERCOCET)  MG per tablet Take 1 tablet by mouth every 8 hours as needed for Pain for up to 30 days. Intended supply: 30 days Max Daily Amount: 3 tablets    allopurinol (ZYLOPRIM) 100 MG tablet Take 1 tablet by mouth daily    magnesium 30 MG tablet Take 1 tablet by mouth 2 times daily    midodrine (PROAMATINE) 10 MG tablet Take 1 tablet by mouth 3 times daily    nitroGLYCERIN (NITROSTAT) 0.4 MG SL tablet DISSOLVE

## 2024-01-10 NOTE — PROGRESS NOTES
Chief Complaint   Patient presents with    Follow-up     INR      Patient presents in office today for INR check.  Has c/o feeling like something in moving around in her right ear.  No other concerns.    1. \"Have you been to the ER, urgent care clinic since your last visit?  Hospitalized since your last visit?\" No    2. \"Have you seen or consulted any other health care providers outside of the [facility] since your last visit?\" No     3. For patients aged 45-75: Has the patient had a colonoscopy / FIT/ Cologuard? No      If the patient is female:    4. For patients aged 40-74: Has the patient had a mammogram within the past 2 years? Yes - no Care Gap present      5. For patients aged 21-65: Has the patient had a pap smear? NA - based on age or sex

## 2024-01-14 LAB
AMPHETAMINES SERPL QL SCN: NEGATIVE NG/ML
BARBITURATES SERPL QL SCN: NEGATIVE UG/ML
CANNABINOIDS SERPL QL SCN: NEGATIVE NG/ML
COCAINE+BZE SERPL QL SCN: NEGATIVE NG/ML
PCP SERPL QL SCN: NEGATIVE NG/ML

## 2024-01-18 LAB
6 ACETYLMORPHINE: NEGATIVE
AMPHETAMINES SERPL QL SCN: NEGATIVE NG/ML
BARBITURATES SERPL QL SCN: NEGATIVE UG/ML
CANNABINOIDS SERPL QL SCN: NEGATIVE NG/ML
COCAINE+BZE SERPL QL SCN: NEGATIVE NG/ML
CODEINE: NEGATIVE NG/ML
DIHYDROCODEINE: NEGATIVE NG/ML
HYDROCODONE: NEGATIVE NG/ML
HYDROMORPHONE: NEGATIVE NG/ML
Lab: NEGATIVE NG/ML
MORPHINE: NEGATIVE NG/ML
OPIATES CONFIRMATION: NEGATIVE
OPIATES SERPL QL SCN: NEGATIVE NG/ML
OXYCOCONE: 10.8 NG/ML
OXYCODONE CONF: POSITIVE
OXYCODONE+OXYMORPHONE SERPLBLD QL SCN: ABNORMAL NG/ML
PCP SERPL QL SCN: NEGATIVE NG/ML

## 2024-01-22 ENCOUNTER — HOSPITAL ENCOUNTER (OUTPATIENT)
Facility: HOSPITAL | Age: 69
Discharge: HOME | End: 2024-01-25
Attending: INTERNAL MEDICINE
Payer: MEDICARE

## 2024-01-22 DIAGNOSIS — J84.115 RESPIRATORY BRONCHIOLITIS INTERSTITIAL LUNG DISEASE (HCC): ICD-10-CM

## 2024-01-22 PROCEDURE — 71250 CT THORAX DX C-: CPT

## 2024-01-23 RX ORDER — FOLIC ACID 1 MG/1
1000 TABLET ORAL DAILY
Qty: 90 TABLET | Refills: 3 | Status: SHIPPED | OUTPATIENT
Start: 2024-01-23

## 2024-02-14 DIAGNOSIS — Z79.01 WARFARIN ANTICOAGULATION: ICD-10-CM

## 2024-02-14 RX ORDER — WARFARIN SODIUM 1 MG/1
TABLET ORAL
Qty: 1 TABLET | Refills: 0 | OUTPATIENT
Start: 2024-02-14

## 2024-02-14 NOTE — TELEPHONE ENCOUNTER
This patient has never seen Dr. Diop and has never been seen at this office. It looks like he is being seen by a different provider.

## 2024-02-22 ENCOUNTER — OFFICE VISIT (OUTPATIENT)
Age: 69
End: 2024-02-22
Payer: MEDICARE

## 2024-02-22 VITALS
BODY MASS INDEX: 40.46 KG/M2 | HEART RATE: 91 BPM | HEIGHT: 70 IN | TEMPERATURE: 98.1 F | OXYGEN SATURATION: 92 % | SYSTOLIC BLOOD PRESSURE: 108 MMHG | RESPIRATION RATE: 16 BRPM | DIASTOLIC BLOOD PRESSURE: 73 MMHG

## 2024-02-22 DIAGNOSIS — M25.561 CHRONIC PAIN OF RIGHT KNEE: ICD-10-CM

## 2024-02-22 DIAGNOSIS — G89.29 CHRONIC PAIN OF RIGHT KNEE: ICD-10-CM

## 2024-02-22 DIAGNOSIS — Z79.01 WARFARIN ANTICOAGULATION: Primary | ICD-10-CM

## 2024-02-22 DIAGNOSIS — R07.9 CHEST PAIN, UNSPECIFIED TYPE: ICD-10-CM

## 2024-02-22 DIAGNOSIS — M15.9 PRIMARY OSTEOARTHRITIS INVOLVING MULTIPLE JOINTS: ICD-10-CM

## 2024-02-22 DIAGNOSIS — R07.89 OTHER CHEST PAIN: ICD-10-CM

## 2024-02-22 LAB
POC INR: 1.1
PROTHROMBIN TIME, POC: 13.2

## 2024-02-22 PROCEDURE — G8417 CALC BMI ABV UP PARAM F/U: HCPCS | Performed by: FAMILY MEDICINE

## 2024-02-22 PROCEDURE — G8484 FLU IMMUNIZE NO ADMIN: HCPCS | Performed by: FAMILY MEDICINE

## 2024-02-22 PROCEDURE — 3078F DIAST BP <80 MM HG: CPT | Performed by: FAMILY MEDICINE

## 2024-02-22 PROCEDURE — 3074F SYST BP LT 130 MM HG: CPT | Performed by: FAMILY MEDICINE

## 2024-02-22 PROCEDURE — 85610 PROTHROMBIN TIME: CPT | Performed by: FAMILY MEDICINE

## 2024-02-22 PROCEDURE — 3017F COLORECTAL CA SCREEN DOC REV: CPT | Performed by: FAMILY MEDICINE

## 2024-02-22 PROCEDURE — 99214 OFFICE O/P EST MOD 30 MIN: CPT | Performed by: FAMILY MEDICINE

## 2024-02-22 PROCEDURE — G8427 DOCREV CUR MEDS BY ELIG CLIN: HCPCS | Performed by: FAMILY MEDICINE

## 2024-02-22 PROCEDURE — 1123F ACP DISCUSS/DSCN MKR DOCD: CPT | Performed by: FAMILY MEDICINE

## 2024-02-22 PROCEDURE — 1036F TOBACCO NON-USER: CPT | Performed by: FAMILY MEDICINE

## 2024-02-22 PROCEDURE — G8400 PT W/DXA NO RESULTS DOC: HCPCS | Performed by: FAMILY MEDICINE

## 2024-02-22 PROCEDURE — 1090F PRES/ABSN URINE INCON ASSESS: CPT | Performed by: FAMILY MEDICINE

## 2024-02-22 RX ORDER — WARFARIN SODIUM 1 MG/1
TABLET ORAL
Qty: 45 TABLET | Refills: 3 | Status: SHIPPED | OUTPATIENT
Start: 2024-02-22

## 2024-02-22 RX ORDER — OXYCODONE AND ACETAMINOPHEN 10; 325 MG/1; MG/1
1 TABLET ORAL EVERY 8 HOURS PRN
Qty: 90 TABLET | Refills: 0 | Status: SHIPPED | OUTPATIENT
Start: 2024-03-21 | End: 2024-04-20

## 2024-02-22 RX ORDER — OXYCODONE AND ACETAMINOPHEN 10; 325 MG/1; MG/1
1 TABLET ORAL EVERY 8 HOURS PRN
Qty: 90 TABLET | Refills: 0 | Status: SHIPPED | OUTPATIENT
Start: 2024-02-22 | End: 2024-03-23

## 2024-02-22 NOTE — PATIENT INSTRUCTIONS
professional. [employer] disclaims any warranty or liability for your use of this information.

## 2024-02-22 NOTE — PROGRESS NOTES
Chief Complaint   Patient presents with    Follow-Up from [facility]      Patient presents in office today for KATHY f/u from [facility].  Was admitted on 2/19/24 for rectal bleeding.  Discharged yesterday.  States that her INR was 8.0.  States that when she was discharged yesterday her INR was 1.9.  No other concerns.      \"Have you been to the ER, urgent care clinic since your last visit?  Hospitalized since your last visit?\"    YES - When: approximately 3 days ago.  Where and Why: [facility] for rectal bleeding.    “Have you seen or consulted any other health care providers outside of [facility] since your last visit?”    NO    “Have you had a colorectal cancer screening such as a colonoscopy/FIT/Cologuard?    NO       
DISSOLVE ONE TABLET UNDER THE TONGUE EVERY 5 MINUTES AS NEEDED FOR CHEST PAIN.  DO NOT EXCEED A TOTAL OF 3 DOSES IN 15 MINUTES    atorvastatin (LIPITOR) 80 MG tablet TAKE 1 TABLET BY MOUTH ONCE DAILY AT NIGHT    CALCITRIOL PO Take 0.5 mg by mouth    OXYGEN O2 2L NC 24/7    albuterol sulfate HFA (PROVENTIL;VENTOLIN;PROAIR) 108 (90 Base) MCG/ACT inhaler Inhale 2 puffs into the lungs every 4 hours as needed    metoprolol succinate (TOPROL XL) 50 MG extended release tablet 12.5 mg in the morning and at bedtime 1/2 tablet    ondansetron (ZOFRAN-ODT) 4 MG disintegrating tablet Take 1 tablet by mouth every 8 hours as needed    pantoprazole (PROTONIX) 40 MG tablet Take 1 tablet by mouth 2 times daily (before meals)    polyethylene glycol (GLYCOLAX) 17 GM/SCOOP powder Take 17 g by mouth daily as needed    potassium chloride (KLOR-CON M) 20 MEQ extended release tablet Take 1 tablet by mouth daily    erythromycin (JEFF-CAP) 250 MG CPEP extended release capsule Take 250 mg by mouth 3 times daily (before meals) (Patient not taking: Reported on 5/31/2023)    fluocinolone 0.01 % cream Apply topically 2 times daily (Patient not taking: Reported on 5/31/2023)    isosorbide mononitrate (IMDUR) 30 MG extended release tablet Take 1 tablet by mouth daily (Patient not taking: Reported on 9/21/2023)     No current facility-administered medications for this visit.       Physical Examination: General appearance - alert, well appearing, and in no distress      Assessment/ Plan:   Elizabeth was seen today for follow-up from hospital.    Diagnoses and all orders for this visit:    Warfarin anticoagulation  -     AMB POC PT/INR  -     warfarin (COUMADIN) 1 MG tablet; TAKE 1 MG TABLET BY MOUTH ONCE DAILY EVERY day except take 2 MG ON Wednesday and 2.5mg Friday  1 week samples of eliquis given recheck INR 1 week  Other chest pain  -     oxyCODONE-acetaminophen (PERCOCET)  MG per tablet; Take 1 tablet by mouth every 8 hours as needed for

## 2024-02-29 ENCOUNTER — OFFICE VISIT (OUTPATIENT)
Age: 69
End: 2024-02-29
Payer: MEDICARE

## 2024-02-29 VITALS
SYSTOLIC BLOOD PRESSURE: 109 MMHG | HEIGHT: 70 IN | HEART RATE: 91 BPM | DIASTOLIC BLOOD PRESSURE: 61 MMHG | TEMPERATURE: 97.6 F | BODY MASS INDEX: 40.46 KG/M2 | RESPIRATION RATE: 16 BRPM | OXYGEN SATURATION: 98 %

## 2024-02-29 DIAGNOSIS — Z79.01 WARFARIN ANTICOAGULATION: Primary | ICD-10-CM

## 2024-02-29 DIAGNOSIS — K21.9 GASTROESOPHAGEAL REFLUX DISEASE, UNSPECIFIED WHETHER ESOPHAGITIS PRESENT: ICD-10-CM

## 2024-02-29 DIAGNOSIS — R43.2 LOSS OF TASTE: ICD-10-CM

## 2024-02-29 DIAGNOSIS — K11.7 XEROSTOMIA: ICD-10-CM

## 2024-02-29 LAB
Lab: NORMAL
POC INR: 2.8
PROTHROMBIN TIME, POC: 33.9
QC PASS/FAIL: NORMAL
SARS-COV-2, POC: NORMAL

## 2024-02-29 PROCEDURE — 1036F TOBACCO NON-USER: CPT | Performed by: FAMILY MEDICINE

## 2024-02-29 PROCEDURE — 3074F SYST BP LT 130 MM HG: CPT | Performed by: FAMILY MEDICINE

## 2024-02-29 PROCEDURE — 3078F DIAST BP <80 MM HG: CPT | Performed by: FAMILY MEDICINE

## 2024-02-29 PROCEDURE — G8484 FLU IMMUNIZE NO ADMIN: HCPCS | Performed by: FAMILY MEDICINE

## 2024-02-29 PROCEDURE — G8417 CALC BMI ABV UP PARAM F/U: HCPCS | Performed by: FAMILY MEDICINE

## 2024-02-29 PROCEDURE — G8400 PT W/DXA NO RESULTS DOC: HCPCS | Performed by: FAMILY MEDICINE

## 2024-02-29 PROCEDURE — 1123F ACP DISCUSS/DSCN MKR DOCD: CPT | Performed by: FAMILY MEDICINE

## 2024-02-29 PROCEDURE — PBSHW POCT COVID-19, SARS-COV-2, PCR: Performed by: FAMILY MEDICINE

## 2024-02-29 PROCEDURE — 1090F PRES/ABSN URINE INCON ASSESS: CPT | Performed by: FAMILY MEDICINE

## 2024-02-29 PROCEDURE — 87635 SARS-COV-2 COVID-19 AMP PRB: CPT | Performed by: FAMILY MEDICINE

## 2024-02-29 PROCEDURE — 85610 PROTHROMBIN TIME: CPT | Performed by: FAMILY MEDICINE

## 2024-02-29 PROCEDURE — PBSHW AMB POC PT/INR: Performed by: FAMILY MEDICINE

## 2024-02-29 PROCEDURE — G8427 DOCREV CUR MEDS BY ELIG CLIN: HCPCS | Performed by: FAMILY MEDICINE

## 2024-02-29 PROCEDURE — 99214 OFFICE O/P EST MOD 30 MIN: CPT | Performed by: FAMILY MEDICINE

## 2024-02-29 PROCEDURE — 3017F COLORECTAL CA SCREEN DOC REV: CPT | Performed by: FAMILY MEDICINE

## 2024-02-29 RX ORDER — DEXLANSOPRAZOLE 60 MG/1
60 CAPSULE, DELAYED RELEASE ORAL DAILY
Qty: 90 CAPSULE | Refills: 1 | Status: SHIPPED | OUTPATIENT
Start: 2024-02-29

## 2024-02-29 NOTE — PATIENT INSTRUCTIONS

## 2024-02-29 NOTE — PROGRESS NOTES
Chief Complaint   Patient presents with    Follow-up     INR      Patient presents in office today for INR check.  Has c/o her mouth being completely dry.  States that she has not had an appetite.  No other concerns    \"Have you been to the ER, urgent care clinic since your last visit?  Hospitalized since your last visit?\"    NO    “Have you seen or consulted any other [facility] providers [facility]?”    NO    “Have you had a colorectal cancer screening such as a colonoscopy/FIT/Cologuard?    NO       
by mouth daily       Return in about 4 weeks (around 3/28/2024), or if symptoms worsen or fail to improve.        I have discussed the diagnosis with the patient and the intended plan as seen in the above orders.  The patient has received an after-visit summary and questions were answered concerning future plans. Pt conveyed understanding of plan.    Medication Side Effects and Warnings were discussed with patient    An electronic signature was used to authenticate this note  Nadege Perez, DO

## 2024-03-01 ENCOUNTER — TELEPHONE (OUTPATIENT)
Age: 69
End: 2024-03-01

## 2024-03-01 NOTE — TELEPHONE ENCOUNTER
I think she should at least go to an urgent care and get her hemoglobin and hematocrit counts checked.

## 2024-03-01 NOTE — TELEPHONE ENCOUNTER
Rec'd call from  Katherine Mohr. He states he is concerned about patient with her rectal bleeding and not eating x 5 days. I spoke to patient. She states she was already in the hospital, she saw Dr. Perez yesterday and she has an appt with GI doctor Tuesday, 3/5/24. She states her bleeding is just as bad, not worse. She states its hard to swallow to eat and she does not want to go to the ED. They did not do anything for her there. Patient states the bleeding is not worse than yesterday when she saw Dr. Perez.     Patient encouraged to drink fluids/ electrolytes. Agreed she needs to keep her appointment with GI doctor for Tuesday. She prefers to stay at home until Tuesday when she goes to GI doctor. I reminded that if bleeding got worse, or she felt different, sluggish, faint, sweating , she needed to call 911 . Patient agrees to get fluids with electrolytes and verbalizes she will call 911 if she needs to, She wanted to know if Dr. Perez was ok with this plan,. Please call her back

## 2024-03-01 NOTE — TELEPHONE ENCOUNTER
Writer spoke with patient to inform per Dr. Perez the following:     I think she should at least go to an urgent care and get her hemoglobin and hematocrit counts checked.     Patient verbalized understanding.  Patient states she will [facility] to [facility] [place].

## 2024-03-03 LAB
ENA SS-A AB SER-ACNC: <0.2 AI (ref 0–0.9)
ENA SS-B AB SER-ACNC: <0.2 AI (ref 0–0.9)

## 2024-03-21 ENCOUNTER — TELEPHONE (OUTPATIENT)
Age: 69
End: 2024-03-21

## 2024-03-21 NOTE — TELEPHONE ENCOUNTER
Called and spoke with patient. Advised  that Renetta has agreed to see her for INR check tomorrow. Apt scheduled for 4:00 pm. Advised that this is ONLY for INR that Renetta will not be addressing anything else. Patient verbalized understanding.

## 2024-03-22 ENCOUNTER — OFFICE VISIT (OUTPATIENT)
Age: 69
End: 2024-03-22
Payer: MEDICARE

## 2024-03-22 ENCOUNTER — TELEPHONE (OUTPATIENT)
Age: 69
End: 2024-03-22

## 2024-03-22 VITALS
SYSTOLIC BLOOD PRESSURE: 102 MMHG | HEIGHT: 70 IN | HEART RATE: 87 BPM | OXYGEN SATURATION: 98 % | TEMPERATURE: 98.9 F | BODY MASS INDEX: 40.46 KG/M2 | RESPIRATION RATE: 20 BRPM | DIASTOLIC BLOOD PRESSURE: 62 MMHG

## 2024-03-22 DIAGNOSIS — I48.91 ATRIAL FIBRILLATION, UNSPECIFIED TYPE (HCC): ICD-10-CM

## 2024-03-22 DIAGNOSIS — K74.60 HEPATIC CIRRHOSIS, UNSPECIFIED HEPATIC CIRRHOSIS TYPE, UNSPECIFIED WHETHER ASCITES PRESENT (HCC): Primary | ICD-10-CM

## 2024-03-22 LAB
POC INR: NORMAL
PROTHROMBIN TIME, POC: 3

## 2024-03-22 PROCEDURE — 1123F ACP DISCUSS/DSCN MKR DOCD: CPT | Performed by: PHYSICIAN ASSISTANT

## 2024-03-22 PROCEDURE — 3078F DIAST BP <80 MM HG: CPT | Performed by: PHYSICIAN ASSISTANT

## 2024-03-22 PROCEDURE — 99213 OFFICE O/P EST LOW 20 MIN: CPT | Performed by: PHYSICIAN ASSISTANT

## 2024-03-22 PROCEDURE — 1036F TOBACCO NON-USER: CPT | Performed by: PHYSICIAN ASSISTANT

## 2024-03-22 PROCEDURE — 3074F SYST BP LT 130 MM HG: CPT | Performed by: PHYSICIAN ASSISTANT

## 2024-03-22 PROCEDURE — G8417 CALC BMI ABV UP PARAM F/U: HCPCS | Performed by: PHYSICIAN ASSISTANT

## 2024-03-22 PROCEDURE — 85610 PROTHROMBIN TIME: CPT | Performed by: PHYSICIAN ASSISTANT

## 2024-03-22 PROCEDURE — G8484 FLU IMMUNIZE NO ADMIN: HCPCS | Performed by: PHYSICIAN ASSISTANT

## 2024-03-22 PROCEDURE — G8427 DOCREV CUR MEDS BY ELIG CLIN: HCPCS | Performed by: PHYSICIAN ASSISTANT

## 2024-03-22 PROCEDURE — 3017F COLORECTAL CA SCREEN DOC REV: CPT | Performed by: PHYSICIAN ASSISTANT

## 2024-03-22 PROCEDURE — PBSHW AMB POC PT/INR: Performed by: PHYSICIAN ASSISTANT

## 2024-03-22 PROCEDURE — 1090F PRES/ABSN URINE INCON ASSESS: CPT | Performed by: PHYSICIAN ASSISTANT

## 2024-03-22 PROCEDURE — G8400 PT W/DXA NO RESULTS DOC: HCPCS | Performed by: PHYSICIAN ASSISTANT

## 2024-03-22 NOTE — TELEPHONE ENCOUNTER
Onur with [facility] called in and is asking for a verbal to see pt for PT twice a week for 8 weeks.

## 2024-03-22 NOTE — PROGRESS NOTES
Elizabeth Hopkins is a 68 y.o. female , id x 2(name and ). Reviewed record, history, and  medications.      Chief Complaint   Patient presents with    Coagulation Disorder     Patient here today for INR.   Current dose of coumadin: 1 mg Sun-Mon-Tues-Thur-Sat, 2.5 mg Tues-Friday. Today INR: 3.0      Vitals:    24 1550   BP: 102/62   Site: Right Lower Arm   Position: Sitting   Cuff Size: Large Adult   Pulse: 87   Resp: 20   Temp: 98.9 °F (37.2 °C)   TempSrc: Oral   SpO2: 98%   Height: 1.778 m (5' 10\")       Coordination of Care Questionnaire:   1. Have you been to the ER, urgent care clinic since your last visit?  Hospitalized since your last visit?Yes JW, Potassium low    2. Have you seen or consulted any other health care providers outside of the [facility] since your last visit?  Include any pap smears or colon screening. Yes Cardiologist, Gastro, Nephrologist.          1/10/2024     3:21 PM   PHQ-9    Little interest or pleasure in doing things 0   Feeling down, depressed, or hopeless 0   Trouble falling or staying asleep, or sleeping too much 0   Feeling tired or having little energy 0   Poor appetite or overeating 0   Feeling bad about yourself - or that you are a failure or have let yourself or your family down 0   Trouble concentrating on things, such as reading the newspaper or watching television 0   Moving or speaking so slowly that other people could have noticed. Or the opposite - being so fidgety or restless that you have been moving around a lot more than usual 0   Thoughts that you would be better off dead, or of hurting yourself in some way 0   PHQ-2 Score 0   PHQ-9 Total Score 0   If you checked off any problems, how difficult have these problems made it for you to do your work, take care of things at home, or get along with other people? 0            No data to display                  Social Determinants of Health     Tobacco Use: Medium Risk (2024)    Patient 
    Smoking Tobacco Use: Former     Smokeless Tobacco Use: Never     Passive Exposure: Not on file   Alcohol Use: Not on file   Financial Resource Strain: High Risk (5/31/2023)    Overall Financial Resource Strain (CARDIA)     Difficulty of Paying Living Expenses: Hard   Food Insecurity: Not on file (5/31/2023)   Recent Concern: Food Insecurity - Food Insecurity Present (5/31/2023)    Hunger Vital Sign     Worried About Running Out of Food in the Last Year: Sometimes true     Ran Out of Food in the Last Year: Sometimes true   Transportation Needs: Unmet Transportation Needs (5/31/2023)    PRAPARE - Transportation     Lack of Transportation (Medical): Not on file     Lack of Transportation (Non-Medical): Yes   Physical Activity: Not on file   Stress: Not on file   Social Connections: Not on file   Intimate Partner Violence: Not on file   Depression: Not at risk (1/10/2024)    PHQ-2     PHQ-2 Score: 0   Housing Stability: Unknown (5/31/2023)    Housing Stability Vital Sign     Unable to Pay for Housing in the Last Year: Not on file     Number of Places Lived in the Last Year: Not on file     Unstable Housing in the Last Year: No   Interpersonal Safety: Not on file   Utilities: Not on file

## 2024-03-25 ENCOUNTER — TELEPHONE (OUTPATIENT)
Age: 69
End: 2024-03-25

## 2024-03-25 NOTE — TELEPHONE ENCOUNTER
Called and spoke with Onur. He states that this is not regarding her knee. He states that she has been in and out of the hospital and she has become very weak and having extreme difficulty with ambulation. He states that it takes two people to help her walk.

## 2024-03-25 NOTE — TELEPHONE ENCOUNTER
Cathy with [facility] called in and wanted to check and see if you wanted their nurses to check her inr and if so when?

## 2024-03-26 ENCOUNTER — APPOINTMENT (OUTPATIENT)
Facility: HOSPITAL | Age: 69
DRG: 871 | End: 2024-03-26
Payer: MEDICARE

## 2024-03-26 ENCOUNTER — HOSPITAL ENCOUNTER (INPATIENT)
Facility: HOSPITAL | Age: 69
LOS: 7 days | Discharge: HOME HEALTH | DRG: 871 | End: 2024-04-02
Attending: EMERGENCY MEDICINE | Admitting: FAMILY MEDICINE
Payer: MEDICARE

## 2024-03-26 ENCOUNTER — CLINICAL DOCUMENTATION (OUTPATIENT)
Age: 69
End: 2024-03-26

## 2024-03-26 DIAGNOSIS — N18.9 CHRONIC KIDNEY DISEASE, UNSPECIFIED CKD STAGE: ICD-10-CM

## 2024-03-26 DIAGNOSIS — R60.0 EDEMA OF BOTH LOWER EXTREMITIES: ICD-10-CM

## 2024-03-26 DIAGNOSIS — I95.9 HYPOTENSION, UNSPECIFIED HYPOTENSION TYPE: Primary | ICD-10-CM

## 2024-03-26 DIAGNOSIS — Z99.2 ESRD ON PERITONEAL DIALYSIS (HCC): ICD-10-CM

## 2024-03-26 DIAGNOSIS — R18.8 OTHER ASCITES: ICD-10-CM

## 2024-03-26 DIAGNOSIS — I50.22 CHRONIC SYSTOLIC (CONGESTIVE) HEART FAILURE (HCC): ICD-10-CM

## 2024-03-26 DIAGNOSIS — N18.6 ESRD ON PERITONEAL DIALYSIS (HCC): ICD-10-CM

## 2024-03-26 LAB
ALBUMIN SERPL-MCNC: 1.8 G/DL (ref 3.5–5)
ALBUMIN/GLOB SERPL: 0.5 (ref 1.1–2.2)
ALP SERPL-CCNC: 193 U/L (ref 45–117)
ALT SERPL-CCNC: 12 U/L (ref 12–78)
ANION GAP SERPL CALC-SCNC: 8 MMOL/L (ref 5–15)
AST SERPL-CCNC: 11 U/L (ref 15–37)
BASOPHILS # BLD: 0 K/UL (ref 0–0.1)
BASOPHILS NFR BLD: 0 % (ref 0–1)
BILIRUB SERPL-MCNC: 0.5 MG/DL (ref 0.2–1)
BUN SERPL-MCNC: 12 MG/DL (ref 6–20)
BUN/CREAT SERPL: 2 (ref 12–20)
CALCIUM SERPL-MCNC: 8.9 MG/DL (ref 8.5–10.1)
CHLORIDE SERPL-SCNC: 101 MMOL/L (ref 97–108)
CO2 SERPL-SCNC: 28 MMOL/L (ref 21–32)
CREAT SERPL-MCNC: 5.69 MG/DL (ref 0.55–1.02)
DIFFERENTIAL METHOD BLD: ABNORMAL
EOSINOPHIL # BLD: 0.5 K/UL (ref 0–0.4)
EOSINOPHIL NFR BLD: 3 % (ref 0–7)
ERYTHROCYTE [DISTWIDTH] IN BLOOD BY AUTOMATED COUNT: 19.8 % (ref 11.5–14.5)
GLOBULIN SER CALC-MCNC: 3.5 G/DL (ref 2–4)
GLUCOSE SERPL-MCNC: 135 MG/DL (ref 65–100)
HCT VFR BLD AUTO: 25.7 % (ref 35–47)
HGB BLD-MCNC: 8.1 G/DL (ref 11.5–16)
IMM GRANULOCYTES # BLD AUTO: 0.2 K/UL (ref 0–0.04)
IMM GRANULOCYTES NFR BLD AUTO: 1 % (ref 0–0.5)
INR PPP: 3.3 (ref 0.9–1.1)
LACTATE SERPL-SCNC: 2.9 MMOL/L (ref 0.4–2)
LIPASE SERPL-CCNC: 54 U/L (ref 13–75)
LYMPHOCYTES # BLD: 2.2 K/UL (ref 0.8–3.5)
LYMPHOCYTES NFR BLD: 13 % (ref 12–49)
MCH RBC QN AUTO: 34.5 PG (ref 26–34)
MCHC RBC AUTO-ENTMCNC: 31.5 G/DL (ref 30–36.5)
MCV RBC AUTO: 109.4 FL (ref 80–99)
MONOCYTES # BLD: 0.7 K/UL (ref 0–1)
MONOCYTES NFR BLD: 4 % (ref 5–13)
NEUTS SEG # BLD: 13.2 K/UL (ref 1.8–8)
NEUTS SEG NFR BLD: 79 % (ref 32–75)
NRBC # BLD: 0.12 K/UL (ref 0–0.01)
NRBC BLD-RTO: 0.7 PER 100 WBC
PLATELET # BLD AUTO: 176 K/UL (ref 150–400)
PMV BLD AUTO: 11.4 FL (ref 8.9–12.9)
POTASSIUM SERPL-SCNC: 3.4 MMOL/L (ref 3.5–5.1)
PROT SERPL-MCNC: 5.3 G/DL (ref 6.4–8.2)
PROTHROMBIN TIME: 31.8 SEC (ref 9–11.1)
RBC # BLD AUTO: 2.35 M/UL (ref 3.8–5.2)
RBC MORPH BLD: ABNORMAL
RBC MORPH BLD: ABNORMAL
SODIUM SERPL-SCNC: 137 MMOL/L (ref 136–145)
WBC # BLD AUTO: 16.8 K/UL (ref 3.6–11)

## 2024-03-26 PROCEDURE — P9047 ALBUMIN (HUMAN), 25%, 50ML: HCPCS

## 2024-03-26 PROCEDURE — 2500000003 HC RX 250 WO HCPCS

## 2024-03-26 PROCEDURE — 85025 COMPLETE CBC W/AUTO DIFF WBC: CPT

## 2024-03-26 PROCEDURE — 2000000000 HC ICU R&B

## 2024-03-26 PROCEDURE — 74176 CT ABD & PELVIS W/O CONTRAST: CPT

## 2024-03-26 PROCEDURE — 2580000003 HC RX 258: Performed by: EMERGENCY MEDICINE

## 2024-03-26 PROCEDURE — 87154 CUL TYP ID BLD PTHGN 6+ TRGT: CPT

## 2024-03-26 PROCEDURE — 87040 BLOOD CULTURE FOR BACTERIA: CPT

## 2024-03-26 PROCEDURE — 6360000002 HC RX W HCPCS

## 2024-03-26 PROCEDURE — 96360 HYDRATION IV INFUSION INIT: CPT

## 2024-03-26 PROCEDURE — 85610 PROTHROMBIN TIME: CPT

## 2024-03-26 PROCEDURE — 2580000003 HC RX 258

## 2024-03-26 PROCEDURE — 83036 HEMOGLOBIN GLYCOSYLATED A1C: CPT

## 2024-03-26 PROCEDURE — 6370000000 HC RX 637 (ALT 250 FOR IP): Performed by: EMERGENCY MEDICINE

## 2024-03-26 PROCEDURE — 99285 EMERGENCY DEPT VISIT HI MDM: CPT

## 2024-03-26 PROCEDURE — 71045 X-RAY EXAM CHEST 1 VIEW: CPT

## 2024-03-26 PROCEDURE — 3E033XZ INTRODUCTION OF VASOPRESSOR INTO PERIPHERAL VEIN, PERCUTANEOUS APPROACH: ICD-10-PCS

## 2024-03-26 PROCEDURE — 83605 ASSAY OF LACTIC ACID: CPT

## 2024-03-26 PROCEDURE — 80053 COMPREHEN METABOLIC PANEL: CPT

## 2024-03-26 PROCEDURE — 83690 ASSAY OF LIPASE: CPT

## 2024-03-26 PROCEDURE — 36415 COLL VENOUS BLD VENIPUNCTURE: CPT

## 2024-03-26 RX ORDER — MIDODRINE HYDROCHLORIDE 5 MG/1
5 TABLET ORAL ONCE
Status: COMPLETED | OUTPATIENT
Start: 2024-03-26 | End: 2024-03-26

## 2024-03-26 RX ORDER — ACETAMINOPHEN 325 MG/1
650 TABLET ORAL EVERY 6 HOURS PRN
Status: DISCONTINUED | OUTPATIENT
Start: 2024-03-26 | End: 2024-04-02 | Stop reason: HOSPADM

## 2024-03-26 RX ORDER — POLYETHYLENE GLYCOL 3350 17 G/17G
17 POWDER, FOR SOLUTION ORAL DAILY PRN
Status: DISCONTINUED | OUTPATIENT
Start: 2024-03-26 | End: 2024-03-27

## 2024-03-26 RX ORDER — SODIUM CHLORIDE 9 MG/ML
INJECTION, SOLUTION INTRAVENOUS PRN
Status: DISCONTINUED | OUTPATIENT
Start: 2024-03-26 | End: 2024-04-02 | Stop reason: HOSPADM

## 2024-03-26 RX ORDER — NOREPINEPHRINE BITARTRATE 0.06 MG/ML
.5-2 INJECTION, SOLUTION INTRAVENOUS CONTINUOUS
Status: DISCONTINUED | OUTPATIENT
Start: 2024-03-26 | End: 2024-03-27

## 2024-03-26 RX ORDER — SODIUM CHLORIDE, SODIUM LACTATE, CALCIUM CHLORIDE, MAGNESIUM CHLORIDE AND DEXTROSE 1.5; 538; 448; 18.3; 5.08 G/100ML; MG/100ML; MG/100ML; MG/100ML; MG/100ML
6000 INJECTION, SOLUTION INTRAPERITONEAL EVERY EVENING
Status: DISCONTINUED | OUTPATIENT
Start: 2024-03-26 | End: 2024-03-28

## 2024-03-26 RX ORDER — ALBUMIN (HUMAN) 12.5 G/50ML
25 SOLUTION INTRAVENOUS ONCE
Status: COMPLETED | OUTPATIENT
Start: 2024-03-26 | End: 2024-03-27

## 2024-03-26 RX ORDER — SODIUM CHLORIDE 0.9 % (FLUSH) 0.9 %
5-40 SYRINGE (ML) INJECTION EVERY 12 HOURS SCHEDULED
Status: DISCONTINUED | OUTPATIENT
Start: 2024-03-26 | End: 2024-04-02 | Stop reason: HOSPADM

## 2024-03-26 RX ORDER — 0.9 % SODIUM CHLORIDE 0.9 %
500 INTRAVENOUS SOLUTION INTRAVENOUS ONCE
Status: COMPLETED | OUTPATIENT
Start: 2024-03-26 | End: 2024-03-26

## 2024-03-26 RX ORDER — ONDANSETRON 4 MG/1
4 TABLET, ORALLY DISINTEGRATING ORAL EVERY 8 HOURS PRN
Status: DISCONTINUED | OUTPATIENT
Start: 2024-03-26 | End: 2024-04-02 | Stop reason: HOSPADM

## 2024-03-26 RX ORDER — PANTOPRAZOLE SODIUM 40 MG/1
40 TABLET, DELAYED RELEASE ORAL
Status: DISCONTINUED | OUTPATIENT
Start: 2024-03-27 | End: 2024-03-29

## 2024-03-26 RX ORDER — ACETAMINOPHEN 650 MG/1
650 SUPPOSITORY RECTAL EVERY 6 HOURS PRN
Status: DISCONTINUED | OUTPATIENT
Start: 2024-03-26 | End: 2024-04-02 | Stop reason: HOSPADM

## 2024-03-26 RX ORDER — GENTAMICIN SULFATE 1 MG/G
CREAM TOPICAL DAILY
Status: DISCONTINUED | OUTPATIENT
Start: 2024-03-27 | End: 2024-03-27

## 2024-03-26 RX ORDER — METRONIDAZOLE 500 MG/100ML
500 INJECTION, SOLUTION INTRAVENOUS EVERY 8 HOURS
Status: DISCONTINUED | OUTPATIENT
Start: 2024-03-26 | End: 2024-03-28

## 2024-03-26 RX ORDER — SODIUM CHLORIDE 0.9 % (FLUSH) 0.9 %
5-40 SYRINGE (ML) INJECTION PRN
Status: DISCONTINUED | OUTPATIENT
Start: 2024-03-26 | End: 2024-04-02 | Stop reason: HOSPADM

## 2024-03-26 RX ORDER — ONDANSETRON 2 MG/ML
4 INJECTION INTRAMUSCULAR; INTRAVENOUS EVERY 6 HOURS PRN
Status: DISCONTINUED | OUTPATIENT
Start: 2024-03-26 | End: 2024-04-02 | Stop reason: HOSPADM

## 2024-03-26 RX ADMIN — MIDODRINE HYDROCHLORIDE 5 MG: 5 TABLET ORAL at 19:42

## 2024-03-26 RX ADMIN — SODIUM CHLORIDE, PRESERVATIVE FREE 10 ML: 5 INJECTION INTRAVENOUS at 23:53

## 2024-03-26 RX ADMIN — ALUMINUM HYDROXIDE, MAGNESIUM HYDROXIDE, DIMETHICONE 40 ML: 400; 400; 40 SUSPENSION ORAL at 18:21

## 2024-03-26 RX ADMIN — CEFTRIAXONE SODIUM 2000 MG: 2 INJECTION, POWDER, FOR SOLUTION INTRAMUSCULAR; INTRAVENOUS at 23:59

## 2024-03-26 RX ADMIN — SODIUM CHLORIDE 500 ML: 900 INJECTION, SOLUTION INTRAVENOUS at 18:25

## 2024-03-26 RX ADMIN — SODIUM CHLORIDE 5 MCG/MIN: 9 INJECTION, SOLUTION INTRAVENOUS at 23:55

## 2024-03-26 RX ADMIN — MIDODRINE HYDROCHLORIDE 5 MG: 5 TABLET ORAL at 18:21

## 2024-03-26 RX ADMIN — ALBUMIN (HUMAN) 25 G: 0.25 INJECTION, SOLUTION INTRAVENOUS at 23:57

## 2024-03-26 ASSESSMENT — PAIN - FUNCTIONAL ASSESSMENT: PAIN_FUNCTIONAL_ASSESSMENT: 0-10

## 2024-03-26 ASSESSMENT — PAIN SCALES - GENERAL: PAINLEVEL_OUTOF10: 10

## 2024-03-26 NOTE — ED PROVIDER NOTES
CALCITRIOL PO    Take 0.5 mg by mouth    DEXLANSOPRAZOLE (DEXILANT) 60 MG CPDR DELAYED RELEASE CAPSULE    Take 1 capsule by mouth daily    ERYTHROMYCIN (JEFF-CAP) 250 MG CPEP EXTENDED RELEASE CAPSULE    Take 1 capsule by mouth 3 times daily (before meals)    FLUOCINOLONE 0.01 % CREAM    Apply topically 2 times daily as needed    FOLIC ACID (FOLVITE) 1 MG TABLET    Take 1 tablet by mouth once daily    MAGNESIUM 30 MG TABLET    Take 1 tablet by mouth 2 times daily    METOPROLOL SUCCINATE (TOPROL XL) 50 MG EXTENDED RELEASE TABLET    12.5 mg in the morning and at bedtime 1/2 tablet    MIDODRINE (PROAMATINE) 10 MG TABLET    Take 1 tablet by mouth 3 times daily    NITROGLYCERIN (NITROSTAT) 0.4 MG SL TABLET    DISSOLVE ONE TABLET UNDER THE TONGUE EVERY 5 MINUTES AS NEEDED FOR CHEST PAIN.  DO NOT EXCEED A TOTAL OF 3 DOSES IN 15 MINUTES    ONDANSETRON (ZOFRAN-ODT) 4 MG DISINTEGRATING TABLET    Take 1 tablet by mouth every 8 hours as needed    OXYCODONE-ACETAMINOPHEN (PERCOCET)  MG PER TABLET    Take 1 tablet by mouth every 8 hours as needed for Pain for up to 30 days. Max Daily Amount: 3 tablets    OXYGEN    O2 2L NC 24/7    PANTOPRAZOLE (PROTONIX) 40 MG TABLET    Take 1 tablet by mouth 2 times daily (before meals)    POLYETHYLENE GLYCOL (GLYCOLAX) 17 GM/SCOOP POWDER    Take 17 g by mouth daily as needed    POTASSIUM CHLORIDE (KLOR-CON M) 20 MEQ EXTENDED RELEASE TABLET    Take 1 tablet by mouth daily    WARFARIN (COUMADIN) 1 MG TABLET    TAKE 1 MG TABLET BY MOUTH ONCE DAILY EVERY day except take 2 MG ON Wednesday and 2.5mg Friday       ALLERGIES     Iodine, Shellfish allergy, Levofloxacin, Morphine, Reglan [metoclopramide], and Nsaids    FAMILY HISTORY       Family History   Problem Relation Age of Onset    Anesth Problems Neg Hx     Heart Disease Mother     No Known Problems Daughter     No Known Problems Son     No Known Problems Son           SOCIAL HISTORY       Social History     Socioeconomic History    Marital  Data Reviewed  Labs: ordered.  ECG/medicine tests: ordered.    Risk  Prescription drug management.  Decision regarding hospitalization.            REASSESSMENT     ED Course as of 03/26/24 2102   Tue Mar 26, 2024   1739 EKG: Normal sinus rhythm, ventricular rate 91, , QRS 80, QTc of 460, artifact present, no acute ST changes   [KP]      ED Course User Index  [KP] Imelda Samaniego DO           CONSULTS:  None    PROCEDURES:  Unless otherwise noted below, none     Procedures  Noted potassium 3.4.  Creatinine is 5.69 which is within normal limits for patient.      68-year-old female presents emergency department chief complaint of low blood pressure.  She does have a history of hypotension and is on midodrine 3 times per day.  She has chronic chest and abdominal pain.  She has decubitus sacral ulcers.  She was seen by [facility] today and sent to the emergency department secondary to her low blood pressure      Labs are normal with this and limits for patient's baseline.  I discussed admitting her but her blood pressure did improve.  She was eating [employer] in her room did not appear to be in any distress.  Agreed with plan to discharge home.  She does have chronic hypotension.  Spouse is at bedside      Blood pressure decreased again, patient is also noted to have low albumin and may benefit from albumin infusion.  She now states she does not feel comfortable going home.  Will contact family medicine    68-year-old female with multiple comorbidities presents emergency department with chief complaint of hypotension.  Patient is on midodrine 3 times a day.  She was recently admitted to other facility and diagnosed with cirrhosis and other chronic medical problems.  Patient's had a GI workup for chronic abdominal pain and is scheduled to see a hepatologist within the next several months.  She denies any new complaints but states that [facility] had sent her to the emergency department today due to

## 2024-03-26 NOTE — TELEPHONE ENCOUNTER
Received called transferred from R with Cathy on the line. She states that she is there is patient now and her BP is 92/44 and reports that she is having difficulty swallowing. She states that she has also been complaining of abdominal pain. She states that she feels that patient should go to the ED and wanted to get my thoughts on it. Advised that I agree that patient should go to ED. Advised that for now she should keep apt for Friday and we can f/u from ED or do a KATHY at that time if she is admitted and discharged by them. Advised that if patient gets admitted and is not discharged by Friday to have her  update us so that we can cancel apt and get her r/s. Cathy verbalized understanding.

## 2024-03-26 NOTE — TELEPHONE ENCOUNTER
Called and spoke with Cathy. Advised that is it not necessary at this time. Advised that she has an apt with us Friday and we can check then.

## 2024-03-26 NOTE — ED NOTES
Verbal shift change report given to Veena (oncoming nurse) by Charla (offgoing nurse). Report included the following information ED Encounter Summary, ED SBAR, MAR, and Recent Results.

## 2024-03-26 NOTE — ED TRIAGE NOTES
Pt arrives to ED via EMS from home where pt was seen by Home Health when pt was found to be hypotensive.  BP in triage 80/50.  Pt reports CP, but reports its \"its nothing new\".  Pt has hx of angina, MI, Cirrhosis, acid reflux.  Pt reports \"pain all over\".   Pt on 3L O2 at baseline for lung disease.

## 2024-03-26 NOTE — ED NOTES
Pt's BP still trending low (after report from previous shift). Spoke to Dr and another dose of midodrine was ordered and continue to monitor her.

## 2024-03-26 NOTE — TELEPHONE ENCOUNTER
Called and spoke with Onur. Gave VO to see patient for PT twice a week for 8 weeks per Dr. Perez. Onur verbalized understanding

## 2024-03-27 ENCOUNTER — APPOINTMENT (OUTPATIENT)
Facility: HOSPITAL | Age: 69
DRG: 871 | End: 2024-03-27
Payer: MEDICARE

## 2024-03-27 ENCOUNTER — APPOINTMENT (OUTPATIENT)
Facility: HOSPITAL | Age: 69
DRG: 871 | End: 2024-03-27
Attending: HOSPITALIST
Payer: MEDICARE

## 2024-03-27 ENCOUNTER — CLINICAL DOCUMENTATION (OUTPATIENT)
Age: 69
End: 2024-03-27

## 2024-03-27 DIAGNOSIS — E11.59 DM TYPE 2 CAUSING VASCULAR DISEASE (HCC): ICD-10-CM

## 2024-03-27 DIAGNOSIS — Z74.09 LIMITED MOBILITY: ICD-10-CM

## 2024-03-27 DIAGNOSIS — G47.30 SLEEP APNEA, UNSPECIFIED TYPE: ICD-10-CM

## 2024-03-27 DIAGNOSIS — I95.9 HYPOTENSION, UNSPECIFIED HYPOTENSION TYPE: ICD-10-CM

## 2024-03-27 DIAGNOSIS — I48.20 CHRONIC ATRIAL FIBRILLATION (HCC): ICD-10-CM

## 2024-03-27 DIAGNOSIS — I10 HYPERTENSION, UNSPECIFIED TYPE: ICD-10-CM

## 2024-03-27 DIAGNOSIS — K21.9 GASTROESOPHAGEAL REFLUX DISEASE, UNSPECIFIED WHETHER ESOPHAGITIS PRESENT: ICD-10-CM

## 2024-03-27 DIAGNOSIS — R18.8 OTHER ASCITES: ICD-10-CM

## 2024-03-27 DIAGNOSIS — G47.33 OSA ON CPAP: ICD-10-CM

## 2024-03-27 DIAGNOSIS — N18.6 ESRD ON PERITONEAL DIALYSIS (HCC): ICD-10-CM

## 2024-03-27 DIAGNOSIS — Z99.2 ESRD ON PERITONEAL DIALYSIS (HCC): ICD-10-CM

## 2024-03-27 DIAGNOSIS — I50.30 HEART FAILURE WITH PRESERVED EJECTION FRACTION, UNSPECIFIED HF CHRONICITY (HCC): Primary | ICD-10-CM

## 2024-03-27 LAB
ALBUMIN SERPL-MCNC: 1.9 G/DL (ref 3.5–5)
ALBUMIN/GLOB SERPL: 0.6 (ref 1.1–2.2)
ALP SERPL-CCNC: 164 U/L (ref 45–117)
ALT SERPL-CCNC: 10 U/L (ref 12–78)
ANION GAP SERPL CALC-SCNC: 10 MMOL/L (ref 5–15)
APPEARANCE FLD: ABNORMAL
AST SERPL-CCNC: 12 U/L (ref 15–37)
B PERT DNA SPEC QL NAA+PROBE: NOT DETECTED
BASOPHILS # BLD: 0.1 K/UL (ref 0–0.1)
BASOPHILS NFR BLD: 0 % (ref 0–1)
BILIRUB SERPL-MCNC: 0.5 MG/DL (ref 0.2–1)
BORDETELLA PARAPERTUSSIS BY PCR: NOT DETECTED
BUN SERPL-MCNC: 13 MG/DL (ref 6–20)
BUN/CREAT SERPL: 2 (ref 12–20)
C PNEUM DNA SPEC QL NAA+PROBE: NOT DETECTED
CALCIUM SERPL-MCNC: 8.3 MG/DL (ref 8.5–10.1)
CHLORIDE SERPL-SCNC: 104 MMOL/L (ref 97–108)
CHOLEST SERPL-MCNC: 96 MG/DL
CO2 SERPL-SCNC: 23 MMOL/L (ref 21–32)
COLOR FLD: COLORLESS
CREAT SERPL-MCNC: 5.58 MG/DL (ref 0.55–1.02)
DIFFERENTIAL METHOD BLD: ABNORMAL
ECHO AO ASC DIAM: 2.9 CM
ECHO AO ASCENDING AORTA INDEX: 1.2 CM/M2
ECHO AO ROOT DIAM: 3.2 CM
ECHO AO ROOT INDEX: 1.32 CM/M2
ECHO AV AREA PEAK VELOCITY: 3.2 CM2
ECHO AV AREA VTI: 2.8 CM2
ECHO AV AREA/BSA PEAK VELOCITY: 1.3 CM2/M2
ECHO AV AREA/BSA VTI: 1.2 CM2/M2
ECHO AV MEAN GRADIENT: 7 MMHG
ECHO AV MEAN VELOCITY: 1.2 M/S
ECHO AV PEAK GRADIENT: 14 MMHG
ECHO AV PEAK VELOCITY: 1.9 M/S
ECHO AV VELOCITY RATIO: 0.84
ECHO AV VTI: 40.3 CM
ECHO BSA: 2.51 M2
ECHO BSA: 2.51 M2
ECHO LA DIAMETER INDEX: 1.32 CM/M2
ECHO LA DIAMETER: 3.2 CM
ECHO LA TO AORTIC ROOT RATIO: 1
ECHO LA VOL A-L A2C: 39 ML (ref 22–52)
ECHO LA VOL A-L A4C: 32 ML (ref 22–52)
ECHO LA VOL BP: 36 ML (ref 22–52)
ECHO LA VOL MOD A2C: 40 ML (ref 22–52)
ECHO LA VOL MOD A4C: 29 ML (ref 22–52)
ECHO LA VOL/BSA BIPLANE: 15 ML/M2 (ref 16–34)
ECHO LA VOLUME AREA LENGTH: 38 ML
ECHO LA VOLUME INDEX A-L A2C: 16 ML/M2 (ref 16–34)
ECHO LA VOLUME INDEX A-L A4C: 13 ML/M2 (ref 16–34)
ECHO LA VOLUME INDEX AREA LENGTH: 16 ML/M2 (ref 16–34)
ECHO LA VOLUME INDEX MOD A2C: 17 ML/M2 (ref 16–34)
ECHO LA VOLUME INDEX MOD A4C: 12 ML/M2 (ref 16–34)
ECHO LV E' LATERAL VELOCITY: 7 CM/S
ECHO LV E' SEPTAL VELOCITY: 5 CM/S
ECHO LV EDV A2C: 118 ML
ECHO LV EDV A4C: 118 ML
ECHO LV EDV BP: 122 ML (ref 56–104)
ECHO LV EDV INDEX A4C: 49 ML/M2
ECHO LV EDV INDEX BP: 50 ML/M2
ECHO LV EDV NDEX A2C: 49 ML/M2
ECHO LV EJECTION FRACTION A2C: 83 %
ECHO LV EJECTION FRACTION A4C: 84 %
ECHO LV ESV A2C: 20 ML
ECHO LV ESV A4C: 19 ML
ECHO LV ESV BP: 20 ML (ref 19–49)
ECHO LV ESV INDEX A2C: 8 ML/M2
ECHO LV ESV INDEX A4C: 8 ML/M2
ECHO LV ESV INDEX BP: 8 ML/M2
ECHO LV FRACTIONAL SHORTENING: 38 % (ref 28–44)
ECHO LV INTERNAL DIMENSION DIASTOLE INDEX: 1.65 CM/M2
ECHO LV INTERNAL DIMENSION DIASTOLIC: 4 CM (ref 3.9–5.3)
ECHO LV INTERNAL DIMENSION SYSTOLIC INDEX: 1.03 CM/M2
ECHO LV INTERNAL DIMENSION SYSTOLIC: 2.5 CM
ECHO LV IVSD: 1.2 CM (ref 0.6–0.9)
ECHO LV MASS 2D: 175.8 G (ref 67–162)
ECHO LV MASS INDEX 2D: 72.7 G/M2 (ref 43–95)
ECHO LV POSTERIOR WALL DIASTOLIC: 1.3 CM (ref 0.6–0.9)
ECHO LV RELATIVE WALL THICKNESS RATIO: 0.65
ECHO LVOT AREA: 3.8 CM2
ECHO LVOT AV VTI INDEX: 0.72
ECHO LVOT DIAM: 2.2 CM
ECHO LVOT MEAN GRADIENT: 4 MMHG
ECHO LVOT PEAK GRADIENT: 10 MMHG
ECHO LVOT PEAK VELOCITY: 1.6 M/S
ECHO LVOT STROKE VOLUME INDEX: 45.8 ML/M2
ECHO LVOT SV: 110.9 ML
ECHO LVOT VTI: 29.2 CM
ECHO MV A VELOCITY: 1.31 M/S
ECHO MV E DECELERATION TIME (DT): 220.8 MS
ECHO MV E VELOCITY: 1.09 M/S
ECHO MV E/A RATIO: 0.83
ECHO MV E/E' LATERAL: 15.57
ECHO MV E/E' RATIO (AVERAGED): 18.69
ECHO PV MAX VELOCITY: 1.5 M/S
ECHO PV PEAK GRADIENT: 9 MMHG
ECHO RV INTERNAL DIMENSION: 3.8 CM
ECHO RV TAPSE: 2.2 CM (ref 1.7–?)
ECHO RVOT PEAK GRADIENT: 3 MMHG
ECHO RVOT PEAK VELOCITY: 0.9 M/S
EOSINOPHIL # BLD: 0.5 K/UL (ref 0–0.4)
EOSINOPHIL NFR BLD: 3 % (ref 0–7)
ERYTHROCYTE [DISTWIDTH] IN BLOOD BY AUTOMATED COUNT: 19.6 % (ref 11.5–14.5)
EST. AVERAGE GLUCOSE BLD GHB EST-MCNC: ABNORMAL MG/DL
FERRITIN SERPL-MCNC: 1237 NG/ML (ref 26–388)
FLUAV SUBTYP SPEC NAA+PROBE: NOT DETECTED
FLUBV RNA SPEC QL NAA+PROBE: NOT DETECTED
FOLATE SERPL-MCNC: 16.8 NG/ML (ref 5–21)
GGT SERPL-CCNC: 63 U/L (ref 5–55)
GLOBULIN SER CALC-MCNC: 3.1 G/DL (ref 2–4)
GLUCOSE SERPL-MCNC: 162 MG/DL (ref 65–100)
HADV DNA SPEC QL NAA+PROBE: NOT DETECTED
HBA1C MFR BLD: <3.8 % (ref 4–5.6)
HCOV 229E RNA SPEC QL NAA+PROBE: NOT DETECTED
HCOV HKU1 RNA SPEC QL NAA+PROBE: NOT DETECTED
HCOV NL63 RNA SPEC QL NAA+PROBE: NOT DETECTED
HCOV OC43 RNA SPEC QL NAA+PROBE: NOT DETECTED
HCT VFR BLD AUTO: 23.6 % (ref 35–47)
HDLC SERPL-MCNC: 67 MG/DL
HDLC SERPL: 1.4 (ref 0–5)
HGB BLD-MCNC: 7.4 G/DL (ref 11.5–16)
HMPV RNA SPEC QL NAA+PROBE: NOT DETECTED
HPIV1 RNA SPEC QL NAA+PROBE: NOT DETECTED
HPIV2 RNA SPEC QL NAA+PROBE: NOT DETECTED
HPIV3 RNA SPEC QL NAA+PROBE: NOT DETECTED
HPIV4 RNA SPEC QL NAA+PROBE: NOT DETECTED
IMM GRANULOCYTES # BLD AUTO: 0.1 K/UL (ref 0–0.04)
IMM GRANULOCYTES NFR BLD AUTO: 1 % (ref 0–0.5)
INR PPP: 3.5 (ref 0.9–1.1)
IRON SATN MFR SERPL: 42 % (ref 20–50)
IRON SERPL-MCNC: 51 UG/DL (ref 35–150)
LACTATE SERPL-SCNC: 3.1 MMOL/L (ref 0.4–2)
LACTATE SERPL-SCNC: 3.3 MMOL/L (ref 0.4–2)
LDLC SERPL CALC-MCNC: 18.8 MG/DL (ref 0–100)
LIPASE SERPL-CCNC: 33 U/L (ref 13–75)
LYMPHOCYTES # BLD: 2.5 K/UL (ref 0.8–3.5)
LYMPHOCYTES NFR BLD: 16 % (ref 12–49)
LYMPHOCYTES NFR FLD: 51 %
M PNEUMO DNA SPEC QL NAA+PROBE: NOT DETECTED
MAGNESIUM SERPL-MCNC: 1.2 MG/DL (ref 1.6–2.4)
MCH RBC QN AUTO: 33.5 PG (ref 26–34)
MCHC RBC AUTO-ENTMCNC: 31.4 G/DL (ref 30–36.5)
MCV RBC AUTO: 106.8 FL (ref 80–99)
MESOTHL CELL NFR FLD: 1 %
MONOCYTES # BLD: 0.8 K/UL (ref 0–1)
MONOCYTES NFR BLD: 5 % (ref 5–13)
MONOS+MACROS NFR FLD: 30 %
NEUTROPHILS NFR FLD: 17 %
NEUTS SEG # BLD: 11.9 K/UL (ref 1.8–8)
NEUTS SEG NFR BLD: 75 % (ref 32–75)
NRBC # BLD: 0.07 K/UL (ref 0–0.01)
NRBC BLD-RTO: 0.4 PER 100 WBC
NUC CELL # FLD: 5 /CU MM
OTHER CELLS NFR FLD MANUAL: 1 %
PERIPHERAL SMEAR, MD REVIEW: NORMAL
PLATELET # BLD AUTO: 182 K/UL (ref 150–400)
PMV BLD AUTO: 11.7 FL (ref 8.9–12.9)
POTASSIUM SERPL-SCNC: 3.8 MMOL/L (ref 3.5–5.1)
PROCALCITONIN SERPL-MCNC: 0.6 NG/ML
PROT SERPL-MCNC: 5 G/DL (ref 6.4–8.2)
PROTHROMBIN TIME: 33.1 SEC (ref 9–11.1)
RBC # BLD AUTO: 2.21 M/UL (ref 3.8–5.2)
RBC # FLD: 3 /CU MM
RSV RNA SPEC QL NAA+PROBE: NOT DETECTED
RV+EV RNA SPEC QL NAA+PROBE: NOT DETECTED
SARS-COV-2 RNA RESP QL NAA+PROBE: NOT DETECTED
SODIUM SERPL-SCNC: 137 MMOL/L (ref 136–145)
SPECIMEN SOURCE FLD: ABNORMAL
TIBC SERPL-MCNC: 122 UG/DL (ref 250–450)
TRIGL SERPL-MCNC: 51 MG/DL
TROPONIN I SERPL HS-MCNC: 105 NG/L (ref 0–51)
TROPONIN I SERPL HS-MCNC: 120 NG/L (ref 0–51)
VIT B12 SERPL-MCNC: 938 PG/ML (ref 193–986)
VLDLC SERPL CALC-MCNC: 10.2 MG/DL
WBC # BLD AUTO: 15.8 K/UL (ref 3.6–11)

## 2024-03-27 PROCEDURE — 2000000000 HC ICU R&B

## 2024-03-27 PROCEDURE — 85610 PROTHROMBIN TIME: CPT

## 2024-03-27 PROCEDURE — 76937 US GUIDE VASCULAR ACCESS: CPT

## 2024-03-27 PROCEDURE — 2580000003 HC RX 258: Performed by: HOSPITALIST

## 2024-03-27 PROCEDURE — 83735 ASSAY OF MAGNESIUM: CPT

## 2024-03-27 PROCEDURE — 2500000003 HC RX 250 WO HCPCS: Performed by: HOSPITALIST

## 2024-03-27 PROCEDURE — 82977 ASSAY OF GGT: CPT

## 2024-03-27 PROCEDURE — 2500000003 HC RX 250 WO HCPCS

## 2024-03-27 PROCEDURE — 6360000002 HC RX W HCPCS

## 2024-03-27 PROCEDURE — 82607 VITAMIN B-12: CPT

## 2024-03-27 PROCEDURE — 82746 ASSAY OF FOLIC ACID SERUM: CPT

## 2024-03-27 PROCEDURE — 2709999900 HC NON-CHARGEABLE SUPPLY

## 2024-03-27 PROCEDURE — 83540 ASSAY OF IRON: CPT

## 2024-03-27 PROCEDURE — 84484 ASSAY OF TROPONIN QUANT: CPT

## 2024-03-27 PROCEDURE — 6370000000 HC RX 637 (ALT 250 FOR IP): Performed by: NURSE PRACTITIONER

## 2024-03-27 PROCEDURE — 94660 CPAP INITIATION&MGMT: CPT

## 2024-03-27 PROCEDURE — 99223 1ST HOSP IP/OBS HIGH 75: CPT | Performed by: FAMILY MEDICINE

## 2024-03-27 PROCEDURE — 82103 ALPHA-1-ANTITRYPSIN TOTAL: CPT

## 2024-03-27 PROCEDURE — 84145 PROCALCITONIN (PCT): CPT

## 2024-03-27 PROCEDURE — 2700000000 HC OXYGEN THERAPY PER DAY

## 2024-03-27 PROCEDURE — 36556 INSERT NON-TUNNEL CV CATH: CPT

## 2024-03-27 PROCEDURE — 2580000003 HC RX 258: Performed by: NURSE PRACTITIONER

## 2024-03-27 PROCEDURE — 5A09357 ASSISTANCE WITH RESPIRATORY VENTILATION, LESS THAN 24 CONSECUTIVE HOURS, CONTINUOUS POSITIVE AIRWAY PRESSURE: ICD-10-PCS | Performed by: FAMILY MEDICINE

## 2024-03-27 PROCEDURE — 6370000000 HC RX 637 (ALT 250 FOR IP): Performed by: HOSPITALIST

## 2024-03-27 PROCEDURE — 83605 ASSAY OF LACTIC ACID: CPT

## 2024-03-27 PROCEDURE — 0202U NFCT DS 22 TRGT SARS-COV-2: CPT

## 2024-03-27 PROCEDURE — A4722 DIALYS SOL FLD VOL > 1999CC: HCPCS | Performed by: NURSE PRACTITIONER

## 2024-03-27 PROCEDURE — C1894 INTRO/SHEATH, NON-LASER: HCPCS

## 2024-03-27 PROCEDURE — 89050 BODY FLUID CELL COUNT: CPT

## 2024-03-27 PROCEDURE — 90945 DIALYSIS ONE EVALUATION: CPT

## 2024-03-27 PROCEDURE — 85025 COMPLETE CBC W/AUTO DIFF WBC: CPT

## 2024-03-27 PROCEDURE — 93005 ELECTROCARDIOGRAM TRACING: CPT | Performed by: EMERGENCY MEDICINE

## 2024-03-27 PROCEDURE — 94761 N-INVAS EAR/PLS OXIMETRY MLT: CPT

## 2024-03-27 PROCEDURE — 82728 ASSAY OF FERRITIN: CPT

## 2024-03-27 PROCEDURE — 87205 SMEAR GRAM STAIN: CPT

## 2024-03-27 PROCEDURE — 6370000000 HC RX 637 (ALT 250 FOR IP)

## 2024-03-27 PROCEDURE — 36415 COLL VENOUS BLD VENIPUNCTURE: CPT

## 2024-03-27 PROCEDURE — 6360000002 HC RX W HCPCS: Performed by: NURSE PRACTITIONER

## 2024-03-27 PROCEDURE — 80061 LIPID PANEL: CPT

## 2024-03-27 PROCEDURE — 93306 TTE W/DOPPLER COMPLETE: CPT | Performed by: STUDENT IN AN ORGANIZED HEALTH CARE EDUCATION/TRAINING PROGRAM

## 2024-03-27 PROCEDURE — 02HV33Z INSERTION OF INFUSION DEVICE INTO SUPERIOR VENA CAVA, PERCUTANEOUS APPROACH: ICD-10-PCS | Performed by: STUDENT IN AN ORGANIZED HEALTH CARE EDUCATION/TRAINING PROGRAM

## 2024-03-27 PROCEDURE — 2580000003 HC RX 258

## 2024-03-27 PROCEDURE — 6360000002 HC RX W HCPCS: Performed by: HOSPITALIST

## 2024-03-27 PROCEDURE — 76705 ECHO EXAM OF ABDOMEN: CPT

## 2024-03-27 PROCEDURE — 83550 IRON BINDING TEST: CPT

## 2024-03-27 PROCEDURE — 83690 ASSAY OF LIPASE: CPT

## 2024-03-27 PROCEDURE — C8929 TTE W OR WO FOL WCON,DOPPLER: HCPCS

## 2024-03-27 PROCEDURE — C1751 CATH, INF, PER/CENT/MIDLINE: HCPCS

## 2024-03-27 PROCEDURE — 3E1M39Z IRRIGATION OF PERITONEAL CAVITY USING DIALYSATE, PERCUTANEOUS APPROACH: ICD-10-PCS | Performed by: INTERNAL MEDICINE

## 2024-03-27 PROCEDURE — 6360000004 HC RX CONTRAST MEDICATION

## 2024-03-27 PROCEDURE — 87070 CULTURE OTHR SPECIMN AEROBIC: CPT

## 2024-03-27 PROCEDURE — 80053 COMPREHEN METABOLIC PANEL: CPT

## 2024-03-27 PROCEDURE — 82533 TOTAL CORTISOL: CPT

## 2024-03-27 PROCEDURE — 87015 SPECIMEN INFECT AGNT CONCNTJ: CPT

## 2024-03-27 RX ORDER — LIDOCAINE HYDROCHLORIDE 10 MG/ML
INJECTION, SOLUTION EPIDURAL; INFILTRATION; INTRACAUDAL; PERINEURAL PRN
Status: COMPLETED | OUTPATIENT
Start: 2024-03-27 | End: 2024-03-27

## 2024-03-27 RX ORDER — MAGNESIUM SULFATE HEPTAHYDRATE 40 MG/ML
2000 INJECTION, SOLUTION INTRAVENOUS ONCE
Status: COMPLETED | OUTPATIENT
Start: 2024-03-27 | End: 2024-03-27

## 2024-03-27 RX ORDER — OXYCODONE AND ACETAMINOPHEN 10; 325 MG/1; MG/1
1 TABLET ORAL EVERY 6 HOURS PRN
Status: DISCONTINUED | OUTPATIENT
Start: 2024-03-27 | End: 2024-04-02 | Stop reason: HOSPADM

## 2024-03-27 RX ORDER — METOPROLOL SUCCINATE 25 MG/1
25 TABLET, EXTENDED RELEASE ORAL
Status: DISCONTINUED | OUTPATIENT
Start: 2024-03-27 | End: 2024-04-02 | Stop reason: HOSPADM

## 2024-03-27 RX ORDER — FLUCONAZOLE 2 MG/ML
200 INJECTION, SOLUTION INTRAVENOUS
Status: COMPLETED | OUTPATIENT
Start: 2024-03-27 | End: 2024-03-27

## 2024-03-27 RX ORDER — ATORVASTATIN CALCIUM 20 MG/1
80 TABLET, FILM COATED ORAL NIGHTLY
Status: DISCONTINUED | OUTPATIENT
Start: 2024-03-27 | End: 2024-04-02 | Stop reason: HOSPADM

## 2024-03-27 RX ORDER — MIDODRINE HYDROCHLORIDE 5 MG/1
10 TABLET ORAL 3 TIMES DAILY
Status: DISCONTINUED | OUTPATIENT
Start: 2024-03-27 | End: 2024-03-29

## 2024-03-27 RX ORDER — NOREPINEPHRINE BITARTRATE 0.06 MG/ML
.5-2 INJECTION, SOLUTION INTRAVENOUS CONTINUOUS
Status: DISCONTINUED | OUTPATIENT
Start: 2024-03-27 | End: 2024-03-30

## 2024-03-27 RX ORDER — GENTAMICIN SULFATE 1 MG/G
CREAM TOPICAL
Status: DISCONTINUED | OUTPATIENT
Start: 2024-03-27 | End: 2024-04-02 | Stop reason: HOSPADM

## 2024-03-27 RX ORDER — POLYETHYLENE GLYCOL 3350 17 G/17G
17 POWDER, FOR SOLUTION ORAL DAILY
Status: DISCONTINUED | OUTPATIENT
Start: 2024-03-27 | End: 2024-03-27

## 2024-03-27 RX ORDER — OXYCODONE AND ACETAMINOPHEN 10; 325 MG/1; MG/1
1 TABLET ORAL EVERY 8 HOURS PRN
Status: DISCONTINUED | OUTPATIENT
Start: 2024-03-27 | End: 2024-03-27

## 2024-03-27 RX ORDER — FENTANYL CITRATE 50 UG/ML
50 INJECTION, SOLUTION INTRAMUSCULAR; INTRAVENOUS ONCE
Status: DISCONTINUED | OUTPATIENT
Start: 2024-03-27 | End: 2024-03-31

## 2024-03-27 RX ORDER — SODIUM CHLORIDE, SODIUM LACTATE, CALCIUM CHLORIDE, MAGNESIUM CHLORIDE AND DEXTROSE 1.5; 538; 448; 18.3; 5.08 G/100ML; MG/100ML; MG/100ML; MG/100ML; MG/100ML
6000 INJECTION, SOLUTION INTRAPERITONEAL EVERY EVENING
Status: DISCONTINUED | OUTPATIENT
Start: 2024-03-27 | End: 2024-03-28

## 2024-03-27 RX ORDER — FOLIC ACID 1 MG/1
1000 TABLET ORAL DAILY
Status: DISCONTINUED | OUTPATIENT
Start: 2024-03-27 | End: 2024-04-02 | Stop reason: HOSPADM

## 2024-03-27 RX ORDER — MIDAZOLAM HYDROCHLORIDE 1 MG/ML
2 INJECTION INTRAMUSCULAR; INTRAVENOUS ONCE
Status: DISCONTINUED | OUTPATIENT
Start: 2024-03-27 | End: 2024-03-31

## 2024-03-27 RX ORDER — MIDODRINE HYDROCHLORIDE 5 MG/1
10 TABLET ORAL 3 TIMES DAILY
Status: DISCONTINUED | OUTPATIENT
Start: 2024-03-27 | End: 2024-03-27

## 2024-03-27 RX ORDER — DOCUSATE SODIUM 100 MG/1
100 CAPSULE, LIQUID FILLED ORAL DAILY
Status: DISCONTINUED | OUTPATIENT
Start: 2024-03-27 | End: 2024-04-02 | Stop reason: HOSPADM

## 2024-03-27 RX ORDER — FLUCONAZOLE 2 MG/ML
200 INJECTION, SOLUTION INTRAVENOUS EVERY 24 HOURS
Status: DISCONTINUED | OUTPATIENT
Start: 2024-03-27 | End: 2024-03-27 | Stop reason: DRUGHIGH

## 2024-03-27 RX ORDER — MAGNESIUM 30 MG
30 TABLET ORAL 2 TIMES DAILY
Status: DISCONTINUED | OUTPATIENT
Start: 2024-03-27 | End: 2024-03-28

## 2024-03-27 RX ORDER — LIDOCAINE HYDROCHLORIDE 10 MG/ML
10 INJECTION, SOLUTION EPIDURAL; INFILTRATION; INTRACAUDAL; PERINEURAL ONCE
Status: COMPLETED | OUTPATIENT
Start: 2024-03-27 | End: 2024-03-27

## 2024-03-27 RX ORDER — FENTANYL CITRATE 50 UG/ML
50 INJECTION, SOLUTION INTRAMUSCULAR; INTRAVENOUS ONCE
Status: COMPLETED | OUTPATIENT
Start: 2024-03-27 | End: 2024-03-27

## 2024-03-27 RX ORDER — SENNOSIDES A AND B 8.6 MG/1
1 TABLET, FILM COATED ORAL NIGHTLY
Status: DISCONTINUED | OUTPATIENT
Start: 2024-03-27 | End: 2024-04-02 | Stop reason: HOSPADM

## 2024-03-27 RX ORDER — ALLOPURINOL 100 MG/1
100 TABLET ORAL DAILY
Status: DISCONTINUED | OUTPATIENT
Start: 2024-03-27 | End: 2024-04-02 | Stop reason: HOSPADM

## 2024-03-27 RX ORDER — POLYETHYLENE GLYCOL 3350 17 G/17G
17 POWDER, FOR SOLUTION ORAL 2 TIMES DAILY
Status: DISCONTINUED | OUTPATIENT
Start: 2024-03-27 | End: 2024-04-02 | Stop reason: HOSPADM

## 2024-03-27 RX ORDER — ERYTHROMYCIN ETHYLSUCCINATE 200 MG/5ML
200 SUSPENSION ORAL
Status: DISCONTINUED | OUTPATIENT
Start: 2024-03-27 | End: 2024-04-01

## 2024-03-27 RX ORDER — MIDAZOLAM HYDROCHLORIDE 1 MG/ML
2 INJECTION INTRAMUSCULAR; INTRAVENOUS ONCE
Status: COMPLETED | OUTPATIENT
Start: 2024-03-27 | End: 2024-03-27

## 2024-03-27 RX ADMIN — PERFLUTREN 1.5 ML: 6.52 INJECTION, SUSPENSION INTRAVENOUS at 14:12

## 2024-03-27 RX ADMIN — LIDOCAINE HYDROCHLORIDE 10 ML: 10 INJECTION, SOLUTION EPIDURAL; INFILTRATION; INTRACAUDAL; PERINEURAL at 11:32

## 2024-03-27 RX ADMIN — HYDROCORTISONE SODIUM SUCCINATE 50 MG: 100 INJECTION, POWDER, FOR SOLUTION INTRAMUSCULAR; INTRAVENOUS at 20:43

## 2024-03-27 RX ADMIN — SENNOSIDES 8.6 MG: 8.6 TABLET, FILM COATED ORAL at 20:44

## 2024-03-27 RX ADMIN — POTASSIUM BICARBONATE 20 MEQ: 782 TABLET, EFFERVESCENT ORAL at 08:38

## 2024-03-27 RX ADMIN — POLYETHYLENE GLYCOL 3350 17 G: 17 POWDER, FOR SOLUTION ORAL at 08:38

## 2024-03-27 RX ADMIN — METRONIDAZOLE 500 MG: 500 INJECTION, SOLUTION INTRAVENOUS at 08:43

## 2024-03-27 RX ADMIN — ONDANSETRON 4 MG: 2 INJECTION INTRAMUSCULAR; INTRAVENOUS at 08:51

## 2024-03-27 RX ADMIN — PANTOPRAZOLE SODIUM 40 MG: 40 TABLET, DELAYED RELEASE ORAL at 08:36

## 2024-03-27 RX ADMIN — OXYCODONE AND ACETAMINOPHEN 1 TABLET: 10; 325 TABLET ORAL at 16:26

## 2024-03-27 RX ADMIN — METRONIDAZOLE 500 MG: 500 INJECTION, SOLUTION INTRAVENOUS at 00:07

## 2024-03-27 RX ADMIN — DOCUSATE SODIUM 100 MG: 100 CAPSULE, LIQUID FILLED ORAL at 08:37

## 2024-03-27 RX ADMIN — GENTAMICIN SULFATE: 1 CREAM TOPICAL at 19:20

## 2024-03-27 RX ADMIN — METRONIDAZOLE 500 MG: 500 INJECTION, SOLUTION INTRAVENOUS at 23:51

## 2024-03-27 RX ADMIN — Medication 2500 MG: at 01:10

## 2024-03-27 RX ADMIN — ATORVASTATIN CALCIUM 80 MG: 20 TABLET, FILM COATED ORAL at 20:44

## 2024-03-27 RX ADMIN — OXYCODONE AND ACETAMINOPHEN 1 TABLET: 10; 325 TABLET ORAL at 02:50

## 2024-03-27 RX ADMIN — ERYTHROMYCIN 200 MG: 200 SUSPENSION ORAL at 19:44

## 2024-03-27 RX ADMIN — SODIUM CHLORIDE, PRESERVATIVE FREE 10 ML: 5 INJECTION INTRAVENOUS at 20:46

## 2024-03-27 RX ADMIN — MAGNESIUM SULFATE HEPTAHYDRATE 2000 MG: 40 INJECTION, SOLUTION INTRAVENOUS at 09:02

## 2024-03-27 RX ADMIN — SODIUM CHLORIDE: 9 INJECTION, SOLUTION INTRAVENOUS at 11:43

## 2024-03-27 RX ADMIN — SODIUM CHLORIDE 19 MCG/MIN: 9 INJECTION, SOLUTION INTRAVENOUS at 11:36

## 2024-03-27 RX ADMIN — SODIUM CHLORIDE, SODIUM LACTATE, CALCIUM CHLORIDE, MAGNESIUM CHLORIDE AND DEXTROSE 6000 ML: 1.5; 538; 448; 18.3; 5.08 INJECTION, SOLUTION INTRAPERITONEAL at 19:19

## 2024-03-27 RX ADMIN — ALLOPURINOL 100 MG: 100 TABLET ORAL at 08:38

## 2024-03-27 RX ADMIN — MIDODRINE HYDROCHLORIDE 10 MG: 5 TABLET ORAL at 20:44

## 2024-03-27 RX ADMIN — GENTAMICIN SULFATE: 1 CREAM TOPICAL at 11:32

## 2024-03-27 RX ADMIN — MIDODRINE HYDROCHLORIDE 10 MG: 5 TABLET ORAL at 08:35

## 2024-03-27 RX ADMIN — SODIUM CHLORIDE, PRESERVATIVE FREE 10 ML: 5 INJECTION INTRAVENOUS at 11:35

## 2024-03-27 RX ADMIN — FLUCONAZOLE 200 MG: 200 INJECTION, SOLUTION INTRAVENOUS at 11:45

## 2024-03-27 RX ADMIN — ATORVASTATIN CALCIUM 80 MG: 20 TABLET, FILM COATED ORAL at 01:04

## 2024-03-27 RX ADMIN — MIDODRINE HYDROCHLORIDE 10 MG: 5 TABLET ORAL at 05:00

## 2024-03-27 RX ADMIN — FENTANYL CITRATE 50 MCG: 50 INJECTION INTRAMUSCULAR; INTRAVENOUS at 08:54

## 2024-03-27 RX ADMIN — ERYTHROMYCIN 200 MG: 200 SUSPENSION ORAL at 16:42

## 2024-03-27 RX ADMIN — MIDAZOLAM 2 MG: 1 INJECTION INTRAMUSCULAR; INTRAVENOUS at 11:33

## 2024-03-27 RX ADMIN — METRONIDAZOLE 500 MG: 500 INJECTION, SOLUTION INTRAVENOUS at 16:19

## 2024-03-27 RX ADMIN — GENTAMICIN SULFATE: 1 CREAM TOPICAL at 16:30

## 2024-03-27 RX ADMIN — FOLIC ACID 1000 MCG: 1 TABLET ORAL at 08:37

## 2024-03-27 RX ADMIN — ERYTHROMYCIN 200 MG: 200 SUSPENSION ORAL at 11:16

## 2024-03-27 RX ADMIN — LIDOCAINE HYDROCHLORIDE 2 ML: 10 INJECTION, SOLUTION EPIDURAL; INFILTRATION; INTRACAUDAL; PERINEURAL at 14:46

## 2024-03-27 RX ADMIN — CEFEPIME 2000 MG: 2 INJECTION, POWDER, FOR SOLUTION INTRAVENOUS at 11:21

## 2024-03-27 RX ADMIN — MIDODRINE HYDROCHLORIDE 10 MG: 5 TABLET ORAL at 16:20

## 2024-03-27 RX ADMIN — OXYCODONE AND ACETAMINOPHEN 1 TABLET: 10; 325 TABLET ORAL at 22:16

## 2024-03-27 ASSESSMENT — PAIN SCALES - GENERAL
PAINLEVEL_OUTOF10: 0
PAINLEVEL_OUTOF10: 9
PAINLEVEL_OUTOF10: 0
PAINLEVEL_OUTOF10: 10
PAINLEVEL_OUTOF10: 10
PAINLEVEL_OUTOF10: 9
PAINLEVEL_OUTOF10: 0
PAINLEVEL_OUTOF10: 0

## 2024-03-27 ASSESSMENT — PAIN DESCRIPTION - LOCATION
LOCATION: ABDOMEN

## 2024-03-27 ASSESSMENT — PAIN SCALES - WONG BAKER: WONGBAKER_NUMERICALRESPONSE: NO HURT

## 2024-03-27 ASSESSMENT — PAIN DESCRIPTION - DESCRIPTORS
DESCRIPTORS: ACHING;CRAMPING;THROBBING
DESCRIPTORS: ACHING
DESCRIPTORS: ACHING;CRAMPING;THROBBING

## 2024-03-27 ASSESSMENT — PAIN DESCRIPTION - ORIENTATION: ORIENTATION: MID

## 2024-03-27 ASSESSMENT — PAIN - FUNCTIONAL ASSESSMENT
PAIN_FUNCTIONAL_ASSESSMENT: PREVENTS OR INTERFERES SOME ACTIVE ACTIVITIES AND ADLS
PAIN_FUNCTIONAL_ASSESSMENT: PREVENTS OR INTERFERES SOME ACTIVE ACTIVITIES AND ADLS

## 2024-03-27 NOTE — PROGRESS NOTES
attended rounds in the ICU as part of the Interdisciplinary team where the patient's ongoing care was discussed. Please contact [facility] for further referrals.    Smith Atkins MDiv  Staff   Paging Service [phone] (ROSAMARIA)

## 2024-03-27 NOTE — PROGRESS NOTES
Spiritual Care Assessment/Progress Note  [facility]    Name: Elizabeth Hopkins MRN: [id number]    Age: 68 y.o.     Sex: female   Language: English     Date: 3/27/2024            Total Time Calculated: 14 min              Spiritual Assessment begun in [facility] A4 INTENSIVE CARE UNIT  Service Provided For:: Patient  Referral/Consult From:: Rounding  Encounter Overview/Reason : Initial Encounter    Spiritual beliefs:      [x] Involved in a clint tradition/spiritual practice: Jew - Non Denomination     [x] Supported by a clint community: [facility] [employer]     [] Claims no spiritual orientation:      [] Seeking spiritual identity:           [] Adheres to an individual form of spirituality:      [] Not able to assess:                Identified resources for coping and support system:   Support System: Spouse, Children       [x] Prayer                  [] Devotional reading               [] Music                  [] Guided Imagery     [] Pet visits                                        [] Other: (COMMENT)     Specific area/focus of visit   Encounter:    Crisis:    Spiritual/Emotional needs: Type: Spiritual Support  Ritual, Rites and Sacraments: Type:  (Prayer)  Grief, Loss, and Adjustments:    Ethics/Mediation:    Behavioral Health:    Palliative Care:    Advance Care Planning:      Plan/Referrals: Other (Comment) (Contact spiritual health services for further assistance needed.)    Narrative: Chart review. I rounded on ICU floor and met and conversed with patient named Elizabeth Hopkins. Patient does by the name of Zunilda. Patient Zunilda is  to Uzair Hopkins for over forty-nine years. They have three children and five grandchildren. Patient's emotional support system entails her  and her kids. Patient shared her medical issues and historical records. I provided wellness/healing prayers and listened intently to patient Zunilda share her feelings. I will remain available if

## 2024-03-27 NOTE — H&P
[id number] [id number]   Office [phone]  Fax [phone]       Admission H&P     Name: Elizabeth Hopkins MRN: [id number]  Sex: Female   YOB: 1955  Age: 68 y.o.  PCP: Nadege Perez DO     Source of Information: patient, medical records    Chief complaint: hypotension    History of Present Illness  Elizabeth Hopkins is a 68 y.o. female with pmhx SRD, CAD, HTN, VTE, DM, ILD, pancreatitis  who presents to the ER complaining of hypotension. Home health nurse advised ED visit due to persistent symptomatic hypotension despite midodrine. She is c/o dizziness, lightheadedness, abdominal pain for past several days. Patient has chronic decubitus ulcers, which she says are stable and not infected. Denies chest pain, dyspnea, fever, chills, N/V, diarrhea, bleeding.       ED summary/course:  received 500 cc bolus and midodrine 5mg x2.    Vitals:  Patient Vitals for the past 8 hrs:   Temp Pulse Resp BP SpO2   03/27/24 0000 98.2 °F (36.8 °C) 84 26 91/63 98 %   03/26/24 2300 -- 98 23 (!) 85/50 --   03/26/24 2253 -- 97 -- -- --   03/26/24 2230 -- (!) 103 27 (!) 93/49 --   03/26/24 2145 -- 91 19 (!) 82/44 --   03/26/24 2130 -- 90 14 (!) 82/52 99 %   03/26/24 1945 -- 88 29 (!) 81/52 95 %   03/26/24 1930 -- 85 18 (!) 72/31 100 %   03/26/24 1915 -- 82 22 (!) 88/44 100 %   03/26/24 1900 -- 83 17 (!) 79/40 100 %   03/26/24 1845 -- 89 20 (!) 83/38 --   03/26/24 1832 -- (!) 101 -- -- --   03/26/24 1815 -- 95 20 (!) 83/56 100 %   03/26/24 1800 -- 93 14 (!) 82/41 100 %   03/26/24 1730 -- 92 20 (!) 89/51 100 %   03/26/24 1717 98.7 °F (37.1 °C) 97 22 (!) 87/46 100 %       Pertinent labs: WBC 16.8. LA 2.9.  Imaging:   Xray Result (most recent):  XR CHEST PORTABLE 03/26/2024    Narrative  EXAM:  XR CHEST PORTABLE    INDICATION: r/o pna    COMPARISON: CT 1/22/2024 and chest x-ray 2/9/2023.    TECHNIQUE: Upright portable chest AP view    FINDINGS: Cardiac monitoring leads are noted the cardiac  lesion.  STOMACH: Unremarkable.  SMALL BOWEL: No dilatation or wall thickening.  COLON: No dilatation or wall thickening.  APPENDIX: Not seen. No secondary signs of appendicitis  PERITONEUM: Moderate with right lower quadrant peritoneal dialysis catheter  noted.  RETROPERITONEUM: No lymphadenopathy or aortic aneurysm.  REPRODUCTIVE ORGANS: Uterus and ovaries are surgically absent.  URINARY BLADDER: No mass or calculus.  BONES: Diffuse osteopenia and degenerative spine change with osteoarthritic  changes of the hips. No acute fracture or aggressive lesion.  ABDOMINAL WALL: There is partial visualization of subcutaneous fat stranding in  the right lateral abdominal wall with no mass or collection identified.  ADDITIONAL COMMENTS: N/A    Impression  1. Moderate ascites with peritoneal dialysis catheter in place likely reflecting  dialysate.    2. Right lateral abdominal wall subcutaneous fat stranding without abscess or  mass likely reflect cellulitis.    3. Incidentals as above including small right pleural effusion, coronary artery  disease, and nonobstructing bilateral nephrolithiasis.      EKG: No results found for this or any previous visit (from the past 4464 hour(s)).       Review of Systems  Review of Systems   Constitutional: Negative.    HENT: Negative.     Respiratory: Negative.     Cardiovascular: Negative.    Gastrointestinal:  Positive for abdominal pain, constipation and rectal pain.   Genitourinary:         Does not make urine   Skin:  Positive for wound.   Neurological:  Positive for dizziness and light-headedness.       Past Medical History  Past Medical History:   Diagnosis Date   • Aortic aneurysm (HCC)     Abdominal   • Chronic kidney disease     Hemodiaylsis  3x/week   • Chronic pain    • CKD (chronic kidney disease) stage 4, GFR 15-29 ml/min ([facility]) 02/10/2017   • Coronary atherosclerosis of native coronary artery 02/16/2011   • Diabetic gastroparesis ([facility])    • Diastolic heart failure ([facility])

## 2024-03-27 NOTE — PROGRESS NOTES
ICU Progress Note      HPI:  68y old F with a PMHx of ESRD on PD, DM, CHF has been admitted recently for chest wall pain, abdominal pain and chronic epigastric pain. Has been diagnosed with cirrhosis as well as other comorbidities and is scheduled to see hepatology. She does have a history of low blood pressure and is on midodrine 3 times per day. She had her first dose this morning.   Found to be hypotensive despite crystalloid and colloids in ER and started on levophed.    Subjective: Nauseous this morning. Left chest and abdominal wall pain. Denies chest pain. Afebrile. Levophed at 19 mcg.       Vital Signs:    BP (!) 116/40   Pulse 87   Temp 98.4 °F (36.9 °C) (Oral)   Resp 17   Ht 1.778 m (5' 10\")   Wt 127.9 kg (282 lb)   SpO2 100%   BMI 40.46 kg/m²             Temp (24hrs), Av.4 °F (36.9 °C), Min:98.2 °F (36.8 °C), Max:98.7 °F (37.1 °C)       Intake/Output:   Last shift:      No intake/output data recorded.  Last 3 shifts:  1901 -  0700  In: 760 [I.V.:77.5]  Out: -     Intake/Output Summary (Last 24 hours) at 3/27/2024 0818  Last data filed at 3/27/2024 0620  Gross per 24 hour   Intake 760.04 ml   Output --   Net 760.04 ml       Ventilator Settings:                Physical Exam:    General: Alert, awake and oriented. Not in acute distress  HEENT:  Anicteric sclerae; pink palpebral conjunctivae; mucosa moist  Resp:  Bilateral air entry +, no crackles or wheeze  CV:  S1, S2 present  GI:  Abdomen soft, left sided abdominal tenderness to touch, no significant erythema, (+) active bowel sounds, RLQ PD catheter in place  Extremities:  +2 pulses on all extremities; 1+ edema/ cyanosis/ clubbing noted  Skin:  Warm; no rashes/ lesions noted  Neurologic:  Non-focal    DATA:     Current Facility-Administered Medications   Medication Dose Route Frequency    dianeal lo-aura 1.5% 6,000 mL  6,000 mL IntraPERitoneal QPM    Vancomycin: pharmacy dosing by random level   Other RX Placeholder    allopurinol  Lipase 33 13 - 75 U/L   Magnesium    Collection Time: 03/27/24  4:29 AM   Result Value Ref Range    Magnesium 1.2 (L) 1.6 - 2.4 mg/dL   Lactic Acid    Collection Time: 03/27/24  4:29 AM   Result Value Ref Range    Lactic Acid, Plasma 3.3 (HH) 0.4 - 2.0 MMOL/L   Protime-INR    Collection Time: 03/27/24  4:29 AM   Result Value Ref Range    INR 3.5 (H) 0.9 - 1.1      Protime 33.1 (H) 9.0 - 11.1 sec   Comprehensive Metabolic Panel w/ Reflex to MG    Collection Time: 03/27/24  4:32 AM   Result Value Ref Range    Sodium 137 136 - 145 mmol/L    Potassium 3.8 3.5 - 5.1 mmol/L    Chloride 104 97 - 108 mmol/L    CO2 23 21 - 32 mmol/L    Anion Gap 10 5 - 15 mmol/L    Glucose 162 (H) 65 - 100 mg/dL    BUN 13 6 - 20 MG/DL    Creatinine 5.58 (H) 0.55 - 1.02 MG/DL    Bun/Cre Ratio 2 (L) 12 - 20      Est, Glom Filt Rate 8 (L) >60 ml/min/1.73m2    Calcium 8.3 (L) 8.5 - 10.1 MG/DL    Total Bilirubin 0.5 0.2 - 1.0 MG/DL    ALT 10 (L) 12 - 78 U/L    AST 12 (L) 15 - 37 U/L    Alk Phosphatase 164 (H) 45 - 117 U/L    Total Protein 5.0 (L) 6.4 - 8.2 g/dL    Albumin 1.9 (L) 3.5 - 5.0 g/dL    Globulin 3.1 2.0 - 4.0 g/dL    Albumin/Globulin Ratio 0.6 (L) 1.1 - 2.2         Imaging:    CT ABDOMEN PELVIS WO CONTRAST Additional Contrast? Radiologist Recommendation   Final Result      1. Moderate ascites with peritoneal dialysis catheter in place likely reflecting   dialysate.      2. Right lateral abdominal wall subcutaneous fat stranding without abscess or   mass likely reflect cellulitis.      3. Incidentals as above including small right pleural effusion, coronary artery   disease, and nonobstructing bilateral nephrolithiasis.      XR CHEST PORTABLE   Final Result   Chronic fibrotic changes with no acute interval change          Assessment and Plan:    Septic Shock  Abdominal wall cellulitis  ILD  ESRD on PD  Chronic Pain  HFpEF  Supratherapeutic INR  Lactic acidosis      Recommendations:  - increase percocet to q6h for pain mgmt  - repeat

## 2024-03-27 NOTE — PROGRESS NOTES
Nephrology    Consult received - ESRD on PD  -Chart reviewed, no answer on return call to page. Admitted with hypotension, asymptomatic, recently started on midodrine  -Continue CCPD while inpatient, 1.5% given hypotension  -[facility] on call notified, may be too late to get on cycler this evening. Labs reviewed, no emergent issues  -Home rx per [facility]: 9 hours [id number], 2500mL last fill  -Gent cream to exit site  -Please call/Perfectserve with any questions/issues overnight    --[facility]

## 2024-03-27 NOTE — CONSULTS
[facility]EL[facility] VALLE [facility]    Elizabeth Hopkins  YOB: 1955          Assessment & Plan:     ESRD on CCPD ([facility])  Hypotension  Cirrhosis  ILD  CAD    Rec:  She reports no symptoms of peritonitis and peritonitis does not typically present with hypotension  We can check PD fluid but doubt peritonitis  Will continue PD, all 1.5%. Watch volume as she is prone to edema  Continue pressors  Increase PD UF as hemodynamics improve       Subjective:   CC: ESRD  HPI: Pt with ESRD on CCPD at [facility] is seen for ESRD mgmt. She came in with hypotension and suspected sepsis. No PD issues, no cloudy fluid. She is on 2.5% exchanges with icodextrin day dwell and MDE and is prone to volume overload. Currently in ICU on levo gtt but alert and awake.  ROS: No sob at rest, no cloudy fluid/abd pain  Current Facility-Administered Medications   Medication Dose Route Frequency    dianeal lo-aura 1.5% 6,000 mL  6,000 mL IntraPERitoneal QPM    Vancomycin: pharmacy dosing by random level   Other RX Placeholder    allopurinol (ZYLOPRIM) tablet 100 mg  100 mg Oral Daily    atorvastatin (LIPITOR) tablet 80 mg  80 mg Oral Nightly    erythromycin (EES) 200 MG/5ML suspension 200 mg  200 mg Oral 4x Daily PC & HS    folic acid (FOLVITE) tablet 1,000 mcg  1,000 mcg Oral Daily    magnesium tablet 30 mg (Patient Supplied)  30 mg Oral BID    [Held by provider] metoprolol succinate (TOPROL XL) extended release tablet 25 mg  25 mg Oral QHS    potassium bicarb-citric acid (EFFER-K) effervescent tablet 20 mEq  20 mEq Oral Daily    docusate sodium (COLACE) capsule 100 mg  100 mg Oral Daily    oxyCODONE-acetaminophen (PERCOCET)  MG per tablet 1 tablet  1 tablet Oral Q8H PRN    warfarin placeholder: dosing by pharmacy   Other RX Placeholder    midodrine (PROAMATINE) tablet 10 mg  10 mg Oral TID    magnesium sulfate 2000 mg in water 50 mL IVPB  2,000 mg IntraVENous Once    midazolam (VERSED) injection  2 mg  2 mg IntraVENous Once    senna (SENOKOT) tablet 8.6 mg  1 tablet Oral Nightly    polyethylene glycol (GLYCOLAX) packet 17 g  17 g Oral BID    cefTRIAXone (ROCEPHIN) 2,000 mg in sterile water 20 mL IV syringe  2,000 mg IntraVENous Q24H    metroNIDAZOLE (FLAGYL) 500 mg in 0.9% NaCl 100 mL IVPB premix  500 mg IntraVENous Q8H    norepinephrine (LEVOPHED) 16 mg in sodium chloride 0.9 % 250 mL infusion  0.5-20 mcg/min IntraVENous Continuous    sodium chloride flush 0.9 % injection 5-40 mL  5-40 mL IntraVENous 2 times per day    sodium chloride flush 0.9 % injection 5-40 mL  5-40 mL IntraVENous PRN    0.9 % sodium chloride infusion   IntraVENous PRN    ondansetron (ZOFRAN-ODT) disintegrating tablet 4 mg  4 mg Oral Q8H PRN    Or    ondansetron (ZOFRAN) injection 4 mg  4 mg IntraVENous Q6H PRN    acetaminophen (TYLENOL) tablet 650 mg  650 mg Oral Q6H PRN    Or    acetaminophen (TYLENOL) suppository 650 mg  650 mg Rectal Q6H PRN    pantoprazole (PROTONIX) tablet 40 mg  40 mg Oral QAM AC    dianeal lo-aura 1.5% 6,000 mL  6,000 mL IntraPERitoneal QPM    dianeal lo-aura 1.5% 6,000 mL  6,000 mL IntraPERitoneal QPM    gentamicin (GARAMYCIN) 0.1 % cream   Topical Daily          Objective:     Vitals:  Blood pressure (!) 116/40, pulse 87, temperature 98.4 °F (36.9 °C), temperature source Oral, resp. rate 13, height 1.778 m (5' 10\"), weight 127.9 kg (282 lb), SpO2 100 %.  Temp (24hrs), Av.4 °F (36.9 °C), Min:98.2 °F (36.8 °C), Max:98.7 °F (37.1 °C)      Intake and Output:  No intake/output data recorded.   1901 -  0700  In: 760 [I.V.:77.5]  Out: -     Physical Exam:               GENERAL ASSESSMENT: NAD  HEENT: Nontraumatic   CHEST: unlabored  HEART: reg  ABDOMEN: Soft,NT  EXTREMITY: trace EDEMA  NEURO: Grossly non focal          ECG/rhythm:    Data Review        Recent Labs     24  1725 24  0432    137   K 3.4* 3.8    104   CO2 28 23   BUN 12 13   CREATININE 5.69* 5.58*   GLUCOSE 135* 162*

## 2024-03-27 NOTE — PROGRESS NOTES
Physician Progress Note      PATIENT:               GUSTAVO BOB  CSN #:                  [id number]  :                       1955  ADMIT DATE:       3/26/2024 4:54 PM  DISCH DATE:  RESPONDING  PROVIDER #:        PAPI CANTU          QUERY TEXT:    Pt admitted with sepsis with septic shock, noted to have ILD and on home O2.   If possible, please document in the progress notes and discharge summary if   you are evaluating and/or treating any of the following:    The medical record reflects the following:  Risk Factors: ILD, obesity  Clinical Indicators: admitted with sepsis with septic shock  - ED Triage NN: Pt on 3L O2 at baseline for lung disease.  - H&P: ILD/ VERÓNICA follows with Dr. Chandler at [facility]. Respiratory bronchiolitis ILD   diagnosed via open lung biopsy in . On home 3L oxygen at all times.  Treatment: O2 support, monitoring    Thank you,    Lyubov Mcnally RN  CDI  Options provided:  -- Chronic respiratory failure with hypoxia  -- Chronic respiratory failure with hypercapnia  -- Chronic respiratory failure with hypoxia and hypercapnia  -- Other - I will add my own diagnosis  -- Disagree - Not applicable / Not valid  -- Disagree - Clinically unable to determine / Unknown  -- Refer to Clinical Documentation Reviewer    PROVIDER RESPONSE TEXT:    This patient has chronic respiratory failure with hypoxia.    Query created by: Lyubov Mcnally on 3/27/2024 4:10 PM      Electronically signed by:  PAPI CANTU 3/27/2024 5:15 PM

## 2024-03-27 NOTE — CONSULTS
[facility]  CRITICAL CARE    Tele ICU TEAM Consult Note    Name: Elizabeth Hopkins   : 1955   MRN: [id number]   Date: 3/26/2024           ICU Assessment     Shock  ESRD on PD  COPD            ICU Comprehensive Plan of Care:      Admit to ICU  Critically Ill  Serial Neuro Exams  Awake and alert  Resp stable on RA  BP requiring pressors  Keep MAP > 65  Cont Midodrine 10mg TID  PO Diet  Monitor enal function  ESRD on PD - Nephrology aware  IV Rocephin / Flagyl  DVT prophylaxis       Discussed Care Plan with Bedside RN       Subjective:   Progress Note: 3/26/2024      Reason for ICU Admission: Hypotension     HPI:     68y old F with a PMHx of ESRD on PD, DM, CHF has been admitted recently for chest pain and chronic epigastric pain. Has been diagnosed with cirrhosis as well as other comorbidities and is scheduled to see hepatology. She does have a history of low blood pressure and is on midodrine 3 times per day. She had her first dose this morning.   Found to be hypotensive despite crystalloid and colloids in ER.  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), DM type 2 causing renal disease (HCC), DM type 2 causing vascular disease (HCC), Esophageal stricture, G6PD deficiency, GERD (gastroesophageal reflux disease), Gout, Heart failure (HCC), Hemodialysis access, AV graft (HCC), Hypertension, ILD (interstitial lung disease) (HCC), Infected dental caries, Infection of total right knee replacement (HCC), Loss of appetite, Menopause, Morbid obesity (HCC), OA (osteoarthritis), Ovarian cancer (HCC), Rheumatoid arteritis (HCC), S/P left pulmonary artery  pressure sensor implant placement, Thromboembolus (HCC), Tobacco use disorder, Unspecified sleep apnea, Uterine cervix cancer (HCC), and Vitamin D deficiency.    Past Surgical History:      has a past surgical history that includes IR TUNNELED CVC PLACE WO SQ PORT/PUMP > 5 YEARS (6/18/2018); CT BIOPSY BONE MARROW (6/1/2018); IR NONTUNNELED VASCULAR CATHETER > 5 YEARS (6/4/2018); IR NONTUNNELED VASCULAR CATHETER > 5 YEARS (6/13/2018); orthopedic surgery (Right, 11/12/12); Tonsillectomy; Cardiac catheterization; pr unlisted procedure cardiac surgery (02/2019); Chest surgery (Right); Adenoidectomy; orthopedic surgery (Bilateral); orthopedic surgery (Right); IR INSERT PERITONEAL VENOUS SHUNT; Appendectomy; Carpal tunnel release; Tonsillectomy and adenoidectomy; upper gi endoscopy,dilatn w guide (6/24/2016); Colonoscopy (N/A, 6/24/2016); Cholecystectomy; Hysterectomy; Salpingo-oophorectomy; hernia repair; and vascular surgery (Left).    Home Medications:     Prior to Admission medications    Medication Sig Start Date End Date Taking? Authorizing Provider   dexlansoprazole (DEXILANT) 60 MG CPDR delayed release capsule Take 1 capsule by mouth daily  Patient not taking: Reported on 3/22/2024 2/29/24   Nadege Perez DO   warfarin (COUMADIN) 1 MG tablet TAKE 1 MG TABLET BY MOUTH ONCE DAILY EVERY day except take 2 MG ON Wednesday and 2.5mg Friday  Patient taking differently: Take 1 mg Sun-Mon-Tues-Thurs-Sat and 2.5 mg on Wednesday and Friday as of 3/22. 2/22/24   Nadege Perez DO   oxyCODONE-acetaminophen (PERCOCET)  MG per tablet Take 1 tablet by mouth every 8 hours as needed for Pain for up to 30 days. Max Daily Amount: 3 tablets 3/21/24 4/20/24  Nadege Perez DO   folic acid (FOLVITE) 1 MG tablet Take 1 tablet by mouth once daily 1/23/24   Nadege Perez DO   allopurinol (ZYLOPRIM) 100 MG tablet Take 1 tablet by mouth daily 10/10/23   Nadege Perez DO   magnesium 30 MG tablet Take 1 tablet by mouth 2 times daily

## 2024-03-27 NOTE — ED NOTES
TRANSFER - OUT REPORT:    Verbal report given to GAGAN Boyce on Elizabeth Hopkins  being transferred to ICU/482 for routine progression of patient care       Report consisted of patient's Situation, Background, Assessment and   Recommendations(SBAR).     Information from the following report(s) Nurse Handoff Report, MAR, Recent Results, Quality Measures, and Neuro Assessment was reviewed with the receiving nurse.    [place] Assessment:    Presents to emergency department  because of falls (Syncope, seizure, or loss of consciousness): No  Age > 70: No  Altered Mental Status, Intoxication with alcohol or substance confusion (Disorientation, impaired judgment, poor safety awaremess, or inability to follow instructions): No  Impaired Mobility: Ambulates or transfers with assistive devices or assistance; Unable to ambulate or transer.: No  Nursing Judgement: No          Lines:   Peripheral IV 03/26/24 Right Antecubital (Active)       Peripheral IV 03/26/24 Right Forearm (Active)   Site Assessment Clean, dry & intact 03/26/24 2300   Line Status Blood return noted;Flushed 03/26/24 2300   Line Care Connections checked and tightened 03/26/24 2300   Phlebitis Assessment No symptoms 03/26/24 2300   Infiltration Assessment 0 03/26/24 2300   Alcohol Cap Used No 03/26/24 2300   Dressing Status Clean, dry & intact 03/26/24 2300   Dressing Type Transparent 03/26/24 2300   Dressing Intervention New 03/26/24 2300        Opportunity for questions and clarification was provided.      Patient transported with:  Monitor, O2 @ 3lpm, Patient-specific medications from Pharmacy, and Registered Nurse

## 2024-03-27 NOTE — FLOWSHEET NOTE
Peritoneal Dialysis Initiation / [phone]    Primary RN SBAR: Birdie, GAGAN  Patient Education: access/site care    Hepatitis B Surface Ag   Date/Time Value Ref Range Status   09/07/2022 02:42 AM <0.10  Negative   Index Final     Hep B S Ab   Date/Time Value Ref Range Status   09/06/2022 11:34 AM NONREACTIVE NR   Final     Comment:     (NOTE)  The [employer] Anti-HBs2 assay is traceable to the World Health   Organization (WHO) Hepatitis B Immunoglobulin 1st International   Reference Preparation (1977). Samples with a calculated value of 10   mIU/mL or greater are considered reactive (protective) in accordance   with the CDC guidelines. The accepted criteria for immunity to HBV is   anti-HBs activity greater than or equal to 10 mIU/mL, as defined by   the WHO International Reference Preparation.  Assay performance has not been established in pregnant women,   patients who are immunosuppressed or immunocompromised, nor have   performance characteristics been established in conjunction with   other 's assays for specific HBV serologic markers. This   assay does not differentiate between vaccine induced immune response   and a response due to infection with HBV. Passively acquired anti-HBs   may be identified following patient transfusion, receipt of   immunoglobulin products, etc.         03/27/24 1926   Vitals   BP (!) 131/43   Temp 98.6 °F (37 °C)   Temp Source Oral   Pulse 78   Respirations 18   SpO2 99 %   Observations & Evaluations   Level of Consciousness 0   Oriented X 4   Heart Rhythm Regular   Respiratory Quality/Effort Unlabored   O2 Device Nasal cannula   Bilateral Breath Sounds Diminished   Skin Color   (appropriate for ethnicity)   Skin Condition/Temp Warm;Dry   Abdomen Inspection Obese;Soft;Rounded  (pd cath)   Edema Right lower extremity;Left lower extremity   RLE Edema +2   LLE Edema +2   Peritoneal Dialysis Catheter Mid lower abdomen   No placement date or time found.   Present

## 2024-03-27 NOTE — ED NOTES
TRANSFER - OUT REPORT:    Verbal report given to GAGAN Lozano on Elizabeth Hopkins  being transferred to [place] [id number] for routine progression of patient care       Report consisted of patient's Situation, Background, Assessment and   Recommendations(SBAR).     Information from the following report(s) Nurse Handoff Report, MAR, Recent Results, Quality Measures, and Neuro Assessment was reviewed with the receiving nurse.    [place] Assessment:    Presents to emergency department  because of falls (Syncope, seizure, or loss of consciousness): No  Age > 70: No  Altered Mental Status, Intoxication with alcohol or substance confusion (Disorientation, impaired judgment, poor safety awaremess, or inability to follow instructions): No  Impaired Mobility: Ambulates or transfers with assistive devices or assistance; Unable to ambulate or transer.: No  Nursing Judgement: No          Lines:   Peripheral IV 03/26/24 Right Antecubital (Active)        Opportunity for questions and clarification was provided.      Patient transported with:  O2 @ 3lpm

## 2024-03-27 NOTE — PROGRESS NOTES
[id number] [place] [address]   Office [phone]  Fax [phone]          Subjective / Objective     Subjective  Overnight Events: Admitted overnight    Patient continues to endorse pain in the right leg, abdomen, and chest. Pain is sharp in character, though tolerable. She denies nausea, chest pressure. Patient states that she has trouble breathing at times, doing well on cpap.    Respiratory: CPAP  BP (!) 93/44   Pulse 79   Temp 98.2 °F (36.8 °C) (Axillary)   Resp 16   Ht 1.778 m (5' 10\")   Wt 127.9 kg (282 lb)   SpO2 100%   BMI 40.46 kg/m²    Physical Examination:   General appearance - alert, well appearing, and in no distress  Chest - clear to auscultation, no wheezes, rales or rhonchi, symmetric air entry  Heart - normal rate, regular rhythm, normal S1, S2, no murmurs, rubs, clicks or gallops,   Abdomen - Abdomen TTP with even light touch, no masses noted  Neurological - alert, oriented, normal speech, no focal findings  Skin - warm, dry. No notable rashes  Extremities - peripheral pulses normal, no pedal edema, no clubbing or cyanosis. 1+ pitting edema to the mid shin  Psychiatric - Normal speech and thought processes    I/O:  03/26 0701 - 03/27 0700  In: 716.7 [I.V.:34.1]  Out: -   Inpatient Medications  Current Facility-Administered Medications   Medication Dose Route Frequency    dianeal lo-aura 1.5% 6,000 mL  6,000 mL IntraPERitoneal QPM    Vancomycin: pharmacy dosing by random level   Other RX Placeholder    allopurinol (ZYLOPRIM) tablet 100 mg  100 mg Oral Daily    atorvastatin (LIPITOR) tablet 80 mg  80 mg Oral Nightly    erythromycin ethylsuccinate (EES) tablet 400 mg  400 mg Oral TID WC    folic acid (FOLVITE) tablet 1,000 mcg  1,000 mcg Oral Daily    magnesium tablet 30 mg (Patient Supplied)  30 mg Oral BID    metoprolol succinate (TOPROL XL) extended release tablet 25 mg  25 mg Oral QHS    potassium bicarb-citric acid (EFFER-K) effervescent tablet 20 mEq  20 mEq Oral

## 2024-03-27 NOTE — WOUND CARE
Wound Consult:  new consult Visit. Chart reviewed.  Consulted for left buttock wound.  Spoke with patients nurse,  Lyubov FARAH.  Patient is resting on a amanda in touch bed with supportive mattress.  Heels off loaded with pillows.  Patient is awake, oriented; requires 2 assist to move side to side in bed.  Jt score 15.  Assessment:  POA Left buttock- 0.4x0.4x0.1cm0 moist, pink small amount of bloody drainage with cleansing. Surrounding hypopigmentation with areas of resurfaced pink.  Bilateral heels- blanching pink  Treatment:  Buttock- cleansed with blue wipes, medi honey gel, foam dressing  Wound Recommendations:  Buttock- cleanse with blue wipes, medi honey gel, foam dressing. Change every MWF  Plan:  Spoke with  regarding findings and proposed orders for treatment.  Please re-consult should concerns arise despite continued skin/PI prevention measures.  Nadiya Mcmullen RN  [facility], Wound / Ostomy Department  Wound Healing Office [phone]

## 2024-03-27 NOTE — PROGRESS NOTES
[facility] Dosing Services: 03/27/24   Consult for Warfarin Dosing by Pharmacy by Dr. Marti  Consult provided for this 68 y.o. female, for indication of hx of PE/DVT  Day of Therapy: Continue from home. Pharmacist confirmed with patient that she takes warfarin 2.5 mg on Wed and Fri, and takes warfarin 1 mg all other days.  Dose to achieve an INR goal of 2-3  INR 3.5 today which is SUPRAtherapeutic (up from 3.3 yesterday evening)  Hgb 7.4, Plt 182, no apparent bleeding per progress notes  Daily INR ordered    Order entered to HOLD warfarin tonight    Significant drug interactions, such as enoxaparin: none  PT/INR Lab Results   Component Value Date/Time    INR 3.5 03/27/2024 04:29 AM    INR 1.8 05/03/2023 02:34 PM    INREXT 4.1 05/20/2021 12:00 AM      Platelets Lab Results   Component Value Date/Time     03/27/2024 04:29 AM      H/H Lab Results   Component Value Date/Time    HGB 7.4 03/27/2024 04:29 AM        Pharmacy to follow daily and will provide subsequent Warfarin dosing based on clinical status.  CLAUDINE DUGAN, [facility]) Contact information [phone]

## 2024-03-27 NOTE — CARE COORDINATION
Update:  In discussing pt with FP team today,once pt is off of pressors,the plan is for pt to be evaluated by PT and OT.  If home health is recommended,[facility] is willing to reaccept pt but will need home health resumption orders for SN/PT and OT.  If SNF or rehab are recommended,I will discuss preferences with pt.    Tegan Solorzano RN

## 2024-03-27 NOTE — PROGRESS NOTES
[facility] Dosing Services: Antimicrobial Stewardship Daily Doc  Consult for antibiotic dosing of Vancomycin by Dr. Marti  Indication: skin infection in peritoneal dialysis patient/ septic shock  Day of Therapy: 1    Ht Readings from Last 1 Encounters:   03/26/24 1.778 m (5' 10\")        Wt Readings from Last 1 Encounters:   03/26/24 127.9 kg (282 lb)      Vancomycin therapy:  Loading dose: Vancomycin 2500 mg x1 dose now/given  Maintenance dose: Vancomycin: by random level   Dose calculated to approximate a           a. Target AUC/HORACE of 400-600          b. Trough of 15-20 mcg/mL   Last level:  mcg/mL  Plan:   Dose administration notes:     Non-Kinetic Antimicrobial Dosing Regimen:   Current Regimen:    Recommendation:   Dose administration notes:     Other Antimicrobial   (not dosed by pharmacist)    Cultures    Serum Creatinine Lab Results   Component Value Date/Time    CREATININE 5.69 03/26/2024 05:25 PM          Creatinine Clearance Estimated Creatinine Clearance: 14 mL/min (A) (based on SCr of 5.69 mg/dL (H)).     Temp Temp: 98.2 °F (36.8 °C) (Axillary)       WBC Lab Results   Component Value Date/Time    WBC 16.8 03/26/2024 05:25 PM          Procalcitonin No results found for: \"PROCAL\"   For Antifungals, Metronidazole and Nafcillin: Lab Results   Component Value Date/Time    ALT 12 03/26/2024 05:25 PM    AST 11 03/26/2024 05:25 PM        Pharmacist: Uzair Banegas  [phone]

## 2024-03-27 NOTE — PROGRESS NOTES
Occupational Therapy: defer    OT evaluation deferred. Discussed case with RN and attending team. RN advises PT/ OT defer visit and follow up at a later time; team is working to obtain vascular access via central line and to access PD catheter.    Aston Shah, OTR/L

## 2024-03-27 NOTE — PLAN OF CARE
Problem: Discharge Planning  Goal: Discharge to home or other facility with appropriate resources  Recent Flowsheet Documentation  Taken 3/27/2024 0800 by Lyubov Barreto RN  Discharge to home or other facility with appropriate resources:   Identify barriers to discharge with patient and caregiver   Arrange for needed discharge resources and transportation as appropriate  3/27/2024 0222 by Andra Parish RN  Outcome: Progressing     Problem: Pain  Goal: Verbalizes/displays adequate comfort level or baseline comfort level  3/27/2024 0222 by Andra Parish RN  Outcome: Progressing     Problem: Skin/Tissue Integrity  Goal: Absence of new skin breakdown  Description: 1.  Monitor for areas of redness and/or skin breakdown  2.  Assess vascular access sites hourly  3.  Every 4-6 hours minimum:  Change oxygen saturation probe site  4.  Every 4-6 hours:  If on nasal continuous positive airway pressure, respiratory therapy assess nares and determine need for appliance change or resting period.  3/27/2024 0222 by Andra Parish RN  Outcome: Progressing     Problem: Safety - Adult  Goal: Free from fall injury  Recent Flowsheet Documentation  Taken 3/27/2024 1200 by Lyubov Barreto RN  Free From Fall Injury: Instruct family/caregiver on patient safety  3/27/2024 0222 by Andra Parish RN  Outcome: Progressing     Problem: ABCDS Injury Assessment  Goal: Absence of physical injury  Recent Flowsheet Documentation  Taken 3/27/2024 1200 by Lyubov Barreto RN  Absence of Physical Injury: Implement safety measures based on patient assessment  3/27/2024 0222 by Andra Parish RN  Outcome: Progressing     Problem: Respiratory - Adult  Goal: Achieves optimal ventilation and oxygenation  3/27/2024 0313 by Jeanine Montague RT  Outcome: Progressing

## 2024-03-27 NOTE — CARE COORDINATION
03/27/24 0824   Service Assessment   Patient Orientation Alert and Oriented   Cognition Alert   History Provided By Patient   Primary Caregiver Self   Support Systems Spouse/Significant Other;Children   Patient's Healthcare Decision Maker is: Legal Next of Kin  (-Uzair Hopkins @ [phone])   PCP Verified by CM Yes  (Dr Nadege Perez)   Last Visit to PCP Within last 3 months   Prior Functional Level Assistance with the following:;Mobility   Current Functional Level Assistance with the following:;Bathing;Dressing;Toileting;Cooking;Housework;Shopping;Mobility   Can patient return to prior living arrangement Yes   Ability to make needs known: Good   Family able to assist with home care needs: Yes   Would you like for me to discuss the discharge plan with any other family members/significant others, and if so, who? Yes  ()   Financial Resources Medicare;Other (Comment)  (HA as secondary)   Community Resources Other (Comment)  (home health services)   CM/SW Referral Disease Management Education   Social/Functional History   Lives With Spouse;Son   Type of Home Apartment   Home Layout One level;Performs ADL's on one level   Home Access Level entry   Bathroom Shower/Tub Walk-in shower   Bathroom Toilet Handicap height   Bathroom Equipment Grab bars in shower;Built-in shower seat   Bathroom Accessibility Wheelchair accessible   Home Equipment Cane;Electric scooter;Grab bars;Oxygen;Rollator;Walker, rolling  (CPaP)   Receives Help From Family   ADL Assistance Independent   Homemaking Assistance Independent   Homemaking Responsibilities Yes   Ambulation Assistance Needs assistance  (uses assistive devices to ambulate)   Transfer Assistance Independent   Active  No   Patient's  Info family   Occupation Retired   Discharge Planning   Type of Residence Apartment   Living Arrangements Spouse/Significant Other   Current Services Prior To Admission C-pap;Durable Medical Equipment;Home Care;Oxygen  katrin carrillo            Type of Home Care services:  (P) OT, PT, Nursing Services    ADLS  Prior functional level: (P) Assistance with the following:, Mobility  Current functional level: (P) Assistance with the following:, Bathing, Dressing, Toileting, Cooking, Housework, Shopping, Mobility    Financial    Payor: MEDICARE / Plan: MEDICARE PART A AND B / Product Type: *No Product type* /       Additional Case Management Notes: I am following pt for discharge needs.  Pt will need resumption of home health for SN/PT/OT  Updated clinicals sent to Jf-Jf referred pt to come to the hospital during a home health visit.    The Plan for Transition of Care is related to the following treatment goals of Hypotension [I95.9]  Hypotension, unspecified hypotension type [I95.9]  Chronic kidney disease, unspecified CKD stage [N18.9]      RENUKA GIBSON RN  Case Management Department  Ph: [phone]

## 2024-03-27 NOTE — PROGRESS NOTES
Physical Therapy Note:    PT evaluation deferred. Discussed case with RN and attending team. RN advises PT defer visit and follow up at a later time; team is working to obtain vascular access via central line and to access PD catheter. Will follow.    Vi Christensen, PT, DPT, ADORE

## 2024-03-28 DIAGNOSIS — Z99.2 ESRD ON PERITONEAL DIALYSIS (HCC): ICD-10-CM

## 2024-03-28 DIAGNOSIS — N18.6 ESRD ON PERITONEAL DIALYSIS (HCC): ICD-10-CM

## 2024-03-28 DIAGNOSIS — E11.49 DM TYPE 2 CAUSING NEUROLOGICAL DISEASE (HCC): ICD-10-CM

## 2024-03-28 DIAGNOSIS — J84.9 ILD (INTERSTITIAL LUNG DISEASE) (HCC): ICD-10-CM

## 2024-03-28 DIAGNOSIS — R60.0 EDEMA OF BOTH LOWER EXTREMITIES: ICD-10-CM

## 2024-03-28 DIAGNOSIS — E11.59 DM TYPE 2 CAUSING VASCULAR DISEASE (HCC): ICD-10-CM

## 2024-03-28 DIAGNOSIS — I50.30 HEART FAILURE WITH PRESERVED EJECTION FRACTION, UNSPECIFIED HF CHRONICITY (HCC): Primary | ICD-10-CM

## 2024-03-28 DIAGNOSIS — I48.20 CHRONIC ATRIAL FIBRILLATION (HCC): ICD-10-CM

## 2024-03-28 DIAGNOSIS — J44.9 CHRONIC OBSTRUCTIVE PULMONARY DISEASE, UNSPECIFIED COPD TYPE (HCC): ICD-10-CM

## 2024-03-28 DIAGNOSIS — K21.9 GASTROESOPHAGEAL REFLUX DISEASE, UNSPECIFIED WHETHER ESOPHAGITIS PRESENT: ICD-10-CM

## 2024-03-28 DIAGNOSIS — I95.9 HYPOTENSION, UNSPECIFIED HYPOTENSION TYPE: ICD-10-CM

## 2024-03-28 LAB
ACCESSION NUMBER, LLC1M: ABNORMAL
ACINETOBACTER CALCOAC BAUMANNII COMPLEX BY PCR: NOT DETECTED
ALBUMIN SERPL-MCNC: 1.9 G/DL (ref 3.5–5)
ALBUMIN/GLOB SERPL: 0.6 (ref 1.1–2.2)
ALP SERPL-CCNC: 178 U/L (ref 45–117)
ALT SERPL-CCNC: 10 U/L (ref 12–78)
ANION GAP SERPL CALC-SCNC: 8 MMOL/L (ref 5–15)
AST SERPL-CCNC: 11 U/L (ref 15–37)
B FRAGILIS DNA BLD POS QL NAA+NON-PROBE: NOT DETECTED
BACTERIA SPEC CULT: ABNORMAL
BACTERIA SPEC CULT: ABNORMAL
BACTERIA SPEC CULT: NORMAL
BACTERIA SPEC CULT: NORMAL
BASOPHILS # BLD: 0 K/UL (ref 0–0.1)
BASOPHILS NFR BLD: 0 % (ref 0–1)
BILIRUB SERPL-MCNC: 0.7 MG/DL (ref 0.2–1)
BIOFIRE TEST COMMENT: ABNORMAL
BUN SERPL-MCNC: 17 MG/DL (ref 6–20)
BUN/CREAT SERPL: 3 (ref 12–20)
C ALBICANS DNA BLD POS QL NAA+NON-PROBE: NOT DETECTED
C AURIS DNA BLD POS QL NAA+NON-PROBE: NOT DETECTED
C GATTII+NEOFOR DNA BLD POS QL NAA+N-PRB: NOT DETECTED
C GLABRATA DNA BLD POS QL NAA+NON-PROBE: NOT DETECTED
C KRUSEI DNA BLD POS QL NAA+NON-PROBE: NOT DETECTED
C PARAP DNA BLD POS QL NAA+NON-PROBE: NOT DETECTED
C TROPICLS DNA BLD POS QL NAA+NON-PROBE: NOT DETECTED
CALCIUM SERPL-MCNC: 8.6 MG/DL (ref 8.5–10.1)
CHLORIDE SERPL-SCNC: 100 MMOL/L (ref 97–108)
CO2 SERPL-SCNC: 25 MMOL/L (ref 21–32)
CORTIS SERPL-MCNC: 15.8 UG/DL
CREAT SERPL-MCNC: 5.9 MG/DL (ref 0.55–1.02)
DIFFERENTIAL METHOD BLD: ABNORMAL
E CLOAC COMP DNA BLD POS NAA+NON-PROBE: NOT DETECTED
E COLI DNA BLD POS QL NAA+NON-PROBE: NOT DETECTED
E FAECALIS DNA BLD POS QL NAA+NON-PROBE: NOT DETECTED
E FAECIUM DNA BLD POS QL NAA+NON-PROBE: NOT DETECTED
EKG ATRIAL RATE: 76 BPM
EKG DIAGNOSIS: NORMAL
EKG P AXIS: 27 DEGREES
EKG P-R INTERVAL: 184 MS
EKG Q-T INTERVAL: 414 MS
EKG QRS DURATION: 66 MS
EKG QTC CALCULATION (BAZETT): 465 MS
EKG R AXIS: 9 DEGREES
EKG T AXIS: 44 DEGREES
EKG VENTRICULAR RATE: 76 BPM
ENTEROBACTERALES DNA BLD POS NAA+N-PRB: NOT DETECTED
EOSINOPHIL # BLD: 0 K/UL (ref 0–0.4)
EOSINOPHIL NFR BLD: 0 % (ref 0–7)
ERYTHROCYTE [DISTWIDTH] IN BLOOD BY AUTOMATED COUNT: 19.6 % (ref 11.5–14.5)
GLOBULIN SER CALC-MCNC: 3.2 G/DL (ref 2–4)
GLUCOSE SERPL-MCNC: 215 MG/DL (ref 65–100)
GP B STREP DNA BLD POS QL NAA+NON-PROBE: NOT DETECTED
HAEM INFLU DNA BLD POS QL NAA+NON-PROBE: NOT DETECTED
HBV SURFACE AB SER QL: NONREACTIVE
HBV SURFACE AB SER-ACNC: <3.1 MIU/ML
HBV SURFACE AG SER QL: <0.1 INDEX
HBV SURFACE AG SER QL: NEGATIVE
HCT VFR BLD AUTO: 24.3 % (ref 35–47)
HGB BLD-MCNC: 7.8 G/DL (ref 11.5–16)
IMM GRANULOCYTES # BLD AUTO: 0.1 K/UL (ref 0–0.04)
IMM GRANULOCYTES NFR BLD AUTO: 1 % (ref 0–0.5)
INR PPP: 3.1 (ref 0.9–1.1)
K OXYTOCA DNA BLD POS QL NAA+NON-PROBE: NOT DETECTED
KLEBSIELLA SP DNA BLD POS QL NAA+NON-PRB: NOT DETECTED
KLEBSIELLA SP DNA BLD POS QL NAA+NON-PRB: NOT DETECTED
L MONOCYTOG DNA BLD POS QL NAA+NON-PROBE: NOT DETECTED
LACTATE SERPL-SCNC: 3.8 MMOL/L (ref 0.4–2)
LYMPHOCYTES # BLD: 0.5 K/UL (ref 0.8–3.5)
LYMPHOCYTES NFR BLD: 4 % (ref 12–49)
MAGNESIUM SERPL-MCNC: 1.4 MG/DL (ref 1.6–2.4)
MCH RBC QN AUTO: 34.1 PG (ref 26–34)
MCHC RBC AUTO-ENTMCNC: 32.1 G/DL (ref 30–36.5)
MCV RBC AUTO: 106.1 FL (ref 80–99)
MECA+MECC ISLT/SPM QL: DETECTED
MONOCYTES # BLD: 0.1 K/UL (ref 0–1)
MONOCYTES NFR BLD: 1 % (ref 5–13)
N MEN DNA BLD POS QL NAA+NON-PROBE: NOT DETECTED
NEUTS SEG # BLD: 12.7 K/UL (ref 1.8–8)
NEUTS SEG NFR BLD: 94 % (ref 32–75)
NRBC # BLD: 0.08 K/UL (ref 0–0.01)
NRBC BLD-RTO: 0.6 PER 100 WBC
P AERUGINOSA DNA BLD POS NAA+NON-PROBE: NOT DETECTED
PLATELET # BLD AUTO: 193 K/UL (ref 150–400)
PMV BLD AUTO: 10.9 FL (ref 8.9–12.9)
POTASSIUM SERPL-SCNC: 4.2 MMOL/L (ref 3.5–5.1)
PROCALCITONIN SERPL-MCNC: 0.75 NG/ML
PROT SERPL-MCNC: 5.1 G/DL (ref 6.4–8.2)
PROTEUS SP DNA BLD POS QL NAA+NON-PROBE: NOT DETECTED
PROTHROMBIN TIME: 29.8 SEC (ref 9–11.1)
RBC # BLD AUTO: 2.29 M/UL (ref 3.8–5.2)
RBC MORPH BLD: ABNORMAL
RBC MORPH BLD: ABNORMAL
RESISTANT GENE TARGETS: ABNORMAL
S AUREUS DNA BLD POS QL NAA+NON-PROBE: NOT DETECTED
S AUREUS+CONS DNA BLD POS NAA+NON-PROBE: DETECTED
S EPIDERMIDIS DNA BLD POS QL NAA+NON-PRB: DETECTED
S LUGDUNENSIS DNA BLD POS QL NAA+NON-PRB: NOT DETECTED
S MALTOPHILIA DNA BLD POS QL NAA+NON-PRB: NOT DETECTED
S MARCESCENS DNA BLD POS NAA+NON-PROBE: NOT DETECTED
S PNEUM DNA BLD POS QL NAA+NON-PROBE: NOT DETECTED
S PYO DNA BLD POS QL NAA+NON-PROBE: NOT DETECTED
SALMONELLA DNA BLD POS QL NAA+NON-PROBE: NOT DETECTED
SERVICE CMNT-IMP: ABNORMAL
SERVICE CMNT-IMP: NORMAL
SODIUM SERPL-SCNC: 133 MMOL/L (ref 136–145)
STREPTOCOCCUS DNA BLD POS NAA+NON-PROBE: NOT DETECTED
WBC # BLD AUTO: 13.4 K/UL (ref 3.6–11)

## 2024-03-28 PROCEDURE — 6360000002 HC RX W HCPCS

## 2024-03-28 PROCEDURE — A4722 DIALYS SOL FLD VOL > 1999CC: HCPCS | Performed by: INTERNAL MEDICINE

## 2024-03-28 PROCEDURE — 6370000000 HC RX 637 (ALT 250 FOR IP)

## 2024-03-28 PROCEDURE — 2580000003 HC RX 258: Performed by: HOSPITALIST

## 2024-03-28 PROCEDURE — 2000000000 HC ICU R&B

## 2024-03-28 PROCEDURE — 85025 COMPLETE CBC W/AUTO DIFF WBC: CPT

## 2024-03-28 PROCEDURE — 97530 THERAPEUTIC ACTIVITIES: CPT

## 2024-03-28 PROCEDURE — 2700000000 HC OXYGEN THERAPY PER DAY

## 2024-03-28 PROCEDURE — 86706 HEP B SURFACE ANTIBODY: CPT

## 2024-03-28 PROCEDURE — 83735 ASSAY OF MAGNESIUM: CPT

## 2024-03-28 PROCEDURE — 2500000003 HC RX 250 WO HCPCS: Performed by: HOSPITALIST

## 2024-03-28 PROCEDURE — 94761 N-INVAS EAR/PLS OXIMETRY MLT: CPT

## 2024-03-28 PROCEDURE — 87340 HEPATITIS B SURFACE AG IA: CPT

## 2024-03-28 PROCEDURE — 6360000002 HC RX W HCPCS: Performed by: INTERNAL MEDICINE

## 2024-03-28 PROCEDURE — 83605 ASSAY OF LACTIC ACID: CPT

## 2024-03-28 PROCEDURE — 6360000002 HC RX W HCPCS: Performed by: HOSPITALIST

## 2024-03-28 PROCEDURE — 97161 PT EVAL LOW COMPLEX 20 MIN: CPT

## 2024-03-28 PROCEDURE — 99233 SBSQ HOSP IP/OBS HIGH 50: CPT | Performed by: FAMILY MEDICINE

## 2024-03-28 PROCEDURE — 90945 DIALYSIS ONE EVALUATION: CPT

## 2024-03-28 PROCEDURE — 80053 COMPREHEN METABOLIC PANEL: CPT

## 2024-03-28 PROCEDURE — 93010 ELECTROCARDIOGRAM REPORT: CPT | Performed by: INTERNAL MEDICINE

## 2024-03-28 PROCEDURE — 6370000000 HC RX 637 (ALT 250 FOR IP): Performed by: HOSPITALIST

## 2024-03-28 PROCEDURE — 84145 PROCALCITONIN (PCT): CPT

## 2024-03-28 PROCEDURE — 97165 OT EVAL LOW COMPLEX 30 MIN: CPT

## 2024-03-28 PROCEDURE — 2580000003 HC RX 258

## 2024-03-28 PROCEDURE — 36415 COLL VENOUS BLD VENIPUNCTURE: CPT

## 2024-03-28 PROCEDURE — 2580000003 HC RX 258: Performed by: INTERNAL MEDICINE

## 2024-03-28 PROCEDURE — 85610 PROTHROMBIN TIME: CPT

## 2024-03-28 RX ORDER — LANOLIN ALCOHOL/MO/W.PET/CERES
400 CREAM (GRAM) TOPICAL 2 TIMES DAILY
Status: DISCONTINUED | OUTPATIENT
Start: 2024-03-28 | End: 2024-04-02 | Stop reason: HOSPADM

## 2024-03-28 RX ORDER — SODIUM CHLORIDE, SODIUM LACTATE, CALCIUM CHLORIDE, MAGNESIUM CHLORIDE AND DEXTROSE 1.5; 538; 448; 18.3; 5.08 G/100ML; MG/100ML; MG/100ML; MG/100ML; MG/100ML
6000 INJECTION, SOLUTION INTRAPERITONEAL DAILY
Status: DISCONTINUED | OUTPATIENT
Start: 2024-03-28 | End: 2024-04-01

## 2024-03-28 RX ORDER — POLYETHYLENE GLYCOL 3350 17 G/17G
17 POWDER, FOR SOLUTION ORAL ONCE
Status: COMPLETED | OUTPATIENT
Start: 2024-03-28 | End: 2024-03-28

## 2024-03-28 RX ORDER — MAGNESIUM SULFATE HEPTAHYDRATE 40 MG/ML
2000 INJECTION, SOLUTION INTRAVENOUS ONCE
Status: COMPLETED | OUTPATIENT
Start: 2024-03-28 | End: 2024-03-28

## 2024-03-28 RX ORDER — SODIUM CHLORIDE, SODIUM LACTATE, CALCIUM CHLORIDE, MAGNESIUM CHLORIDE AND DEXTROSE 1.5; 538; 448; 18.3; 5.08 G/100ML; MG/100ML; MG/100ML; MG/100ML; MG/100ML
6000 INJECTION, SOLUTION INTRAPERITONEAL DAILY
Status: DISCONTINUED | OUTPATIENT
Start: 2024-03-28 | End: 2024-03-29 | Stop reason: DRUGHIGH

## 2024-03-28 RX ORDER — DIPHENHYDRAMINE HYDROCHLORIDE 50 MG/ML
50 INJECTION INTRAMUSCULAR; INTRAVENOUS ONCE
Status: COMPLETED | OUTPATIENT
Start: 2024-03-28 | End: 2024-03-29

## 2024-03-28 RX ADMIN — OXYCODONE AND ACETAMINOPHEN 1 TABLET: 10; 325 TABLET ORAL at 16:28

## 2024-03-28 RX ADMIN — OXYCODONE AND ACETAMINOPHEN 1 TABLET: 10; 325 TABLET ORAL at 09:44

## 2024-03-28 RX ADMIN — METRONIDAZOLE 500 MG: 500 INJECTION, SOLUTION INTRAVENOUS at 07:52

## 2024-03-28 RX ADMIN — CEFEPIME 1000 MG: 1 INJECTION, POWDER, FOR SOLUTION INTRAMUSCULAR; INTRAVENOUS at 09:31

## 2024-03-28 RX ADMIN — SODIUM CHLORIDE, SODIUM LACTATE, CALCIUM CHLORIDE, MAGNESIUM CHLORIDE AND DEXTROSE 6000 ML: 1.5; 538; 448; 18.3; 5.08 INJECTION, SOLUTION INTRAPERITONEAL at 16:05

## 2024-03-28 RX ADMIN — ALLOPURINOL 100 MG: 100 TABLET ORAL at 09:15

## 2024-03-28 RX ADMIN — HYDROCORTISONE SODIUM SUCCINATE 50 MG: 100 INJECTION, POWDER, FOR SOLUTION INTRAMUSCULAR; INTRAVENOUS at 21:37

## 2024-03-28 RX ADMIN — MAGNESIUM SULFATE HEPTAHYDRATE 2000 MG: 40 INJECTION, SOLUTION INTRAVENOUS at 08:05

## 2024-03-28 RX ADMIN — FLUCONAZOLE 100 MG: 200 INJECTION, SOLUTION INTRAVENOUS at 09:42

## 2024-03-28 RX ADMIN — WATER 40 MG: 1 INJECTION INTRAMUSCULAR; INTRAVENOUS; SUBCUTANEOUS at 21:37

## 2024-03-28 RX ADMIN — OXYCODONE AND ACETAMINOPHEN 1 TABLET: 10; 325 TABLET ORAL at 22:22

## 2024-03-28 RX ADMIN — SODIUM CHLORIDE, SODIUM LACTATE, CALCIUM CHLORIDE, MAGNESIUM CHLORIDE AND DEXTROSE 6000 ML: 1.5; 538; 448; 18.3; 5.08 INJECTION, SOLUTION INTRAPERITONEAL at 16:04

## 2024-03-28 RX ADMIN — ERYTHROMYCIN 200 MG: 200 SUSPENSION ORAL at 21:41

## 2024-03-28 RX ADMIN — MIDODRINE HYDROCHLORIDE 10 MG: 5 TABLET ORAL at 15:11

## 2024-03-28 RX ADMIN — POLYETHYLENE GLYCOL 3350 17 G: 17 POWDER, FOR SOLUTION ORAL at 09:15

## 2024-03-28 RX ADMIN — OXYCODONE AND ACETAMINOPHEN 1 TABLET: 10; 325 TABLET ORAL at 04:13

## 2024-03-28 RX ADMIN — ONDANSETRON 4 MG: 2 INJECTION INTRAMUSCULAR; INTRAVENOUS at 12:24

## 2024-03-28 RX ADMIN — DOCUSATE SODIUM 100 MG: 100 CAPSULE, LIQUID FILLED ORAL at 09:15

## 2024-03-28 RX ADMIN — ERYTHROMYCIN 200 MG: 200 SUSPENSION ORAL at 18:56

## 2024-03-28 RX ADMIN — ERYTHROMYCIN 200 MG: 200 SUSPENSION ORAL at 09:25

## 2024-03-28 RX ADMIN — ATORVASTATIN CALCIUM 80 MG: 20 TABLET, FILM COATED ORAL at 21:05

## 2024-03-28 RX ADMIN — MIDODRINE HYDROCHLORIDE 10 MG: 5 TABLET ORAL at 09:15

## 2024-03-28 RX ADMIN — METHYLPREDNISOLONE SODIUM SUCCINATE 40 MG: 40 INJECTION INTRAMUSCULAR; INTRAVENOUS at 16:21

## 2024-03-28 RX ADMIN — SODIUM CHLORIDE, PRESERVATIVE FREE 10 ML: 5 INJECTION INTRAVENOUS at 09:32

## 2024-03-28 RX ADMIN — POTASSIUM BICARBONATE 20 MEQ: 782 TABLET, EFFERVESCENT ORAL at 09:15

## 2024-03-28 RX ADMIN — WATER 40 MG: 1 INJECTION INTRAMUSCULAR; INTRAVENOUS; SUBCUTANEOUS at 18:12

## 2024-03-28 RX ADMIN — HYDROCORTISONE SODIUM SUCCINATE 50 MG: 100 INJECTION, POWDER, FOR SOLUTION INTRAMUSCULAR; INTRAVENOUS at 13:25

## 2024-03-28 RX ADMIN — ERYTHROMYCIN 200 MG: 200 SUSPENSION ORAL at 13:20

## 2024-03-28 RX ADMIN — Medication 400 MG: at 21:36

## 2024-03-28 RX ADMIN — FOLIC ACID 1000 MCG: 1 TABLET ORAL at 09:15

## 2024-03-28 RX ADMIN — PANTOPRAZOLE SODIUM 40 MG: 40 TABLET, DELAYED RELEASE ORAL at 06:16

## 2024-03-28 RX ADMIN — SENNOSIDES 8.6 MG: 8.6 TABLET, FILM COATED ORAL at 21:36

## 2024-03-28 RX ADMIN — MIDODRINE HYDROCHLORIDE 10 MG: 5 TABLET ORAL at 21:05

## 2024-03-28 RX ADMIN — HYDROCORTISONE SODIUM SUCCINATE 50 MG: 100 INJECTION, POWDER, FOR SOLUTION INTRAMUSCULAR; INTRAVENOUS at 02:49

## 2024-03-28 RX ADMIN — GENTAMICIN SULFATE: 1 CREAM TOPICAL at 16:05

## 2024-03-28 RX ADMIN — SODIUM CHLORIDE, PRESERVATIVE FREE 10 ML: 5 INJECTION INTRAVENOUS at 21:38

## 2024-03-28 ASSESSMENT — PAIN DESCRIPTION - LOCATION
LOCATION: ABDOMEN

## 2024-03-28 ASSESSMENT — PAIN SCALES - GENERAL
PAINLEVEL_OUTOF10: 7
PAINLEVEL_OUTOF10: 4
PAINLEVEL_OUTOF10: 8
PAINLEVEL_OUTOF10: 2

## 2024-03-28 ASSESSMENT — PAIN DESCRIPTION - DESCRIPTORS
DESCRIPTORS: ACHING
DESCRIPTORS: ACHING

## 2024-03-28 ASSESSMENT — PAIN DESCRIPTION - ORIENTATION
ORIENTATION: RIGHT
ORIENTATION: LEFT

## 2024-03-28 ASSESSMENT — PAIN - FUNCTIONAL ASSESSMENT
PAIN_FUNCTIONAL_ASSESSMENT: ACTIVITIES ARE NOT PREVENTED
PAIN_FUNCTIONAL_ASSESSMENT: ACTIVITIES ARE NOT PREVENTED

## 2024-03-28 NOTE — PROGRESS NOTES
ICU Progress Note      HPI:  68y old F with a PMHx of ESRD on PD, DM, CHF has been admitted recently for chest wall pain, abdominal pain and chronic epigastric pain. Has been diagnosed with cirrhosis as well as other comorbidities and is scheduled to see hepatology. She does have a history of low blood pressure and is on midodrine 3 times per day. She had her first dose this morning.   Found to be hypotensive despite crystalloid and colloids in ER and started on levophed.    Subjective: Nauseous this morning. Left chest and abdominal wall pain. Denies chest pain. Afebrile. Levophed at 19 mcg.     3/28: PD overnight. Stable this morning. Levophed weaned down to 4. Afebrile. Nausea improved. Low PO intake.       Vital Signs:    BP (!) 106/41   Pulse 72   Temp 97.7 °F (36.5 °C) (Axillary)   Resp 15   Ht 1.778 m (5' 10\")   Wt 127.9 kg (282 lb)   SpO2 100%   BMI 40.46 kg/m²             Temp (24hrs), Av °F (36.7 °C), Min:97.3 °F (36.3 °C), Max:98.7 °F (37.1 °C)       Intake/Output:   Last shift:       07 -  1900  In: 29.6 [I.V.:29.6]  Out: -   Last 3 shifts:  190 -  0700  In: 2129 [I.V.:486]  Out: 1900     Intake/Output Summary (Last 24 hours) at 3/28/2024 1015  Last data filed at 3/28/2024 0846  Gross per 24 hour   Intake 1398.48 ml   Output 1900 ml   Net -501.52 ml       Ventilator Settings:                Physical Exam:    General: Alert, awake and oriented. Not in acute distress  HEENT:  Anicteric sclerae; pink palpebral conjunctivae; mucosa moist  Resp:  Bilateral air entry +, no crackles or wheeze  CV:  S1, S2 present  GI:  Abdomen soft, left sided abdominal tenderness to touch, no significant erythema, (+) active bowel sounds, RLQ PD catheter in place  Extremities:  +2 pulses on all extremities; 1+ edema/ cyanosis/ clubbing noted  Skin:  Warm; no rashes/ lesions noted  Neurologic:  Non-focal    DATA:     Current Facility-Administered Medications   Medication Dose Route Frequency  35.0 - 47.0 %    .1 (H) 80.0 - 99.0 FL    MCH 34.1 (H) 26.0 - 34.0 PG    MCHC 32.1 30.0 - 36.5 g/dL    RDW 19.6 (H) 11.5 - 14.5 %    Platelets 193 150 - 400 K/uL    MPV 10.9 8.9 - 12.9 FL    Nucleated RBCs 0.6 (H) 0  WBC    nRBC 0.08 (H) 0.00 - 0.01 K/uL    Neutrophils % 94 (H) 32 - 75 %    Lymphocytes % 4 (L) 12 - 49 %    Monocytes % 1 (L) 5 - 13 %    Eosinophils % 0 0 - 7 %    Basophils % 0 0 - 1 %    Immature Granulocytes 1 (H) 0.0 - 0.5 %    Neutrophils Absolute 12.7 (H) 1.8 - 8.0 K/UL    Lymphocytes Absolute 0.5 (L) 0.8 - 3.5 K/UL    Monocytes Absolute 0.1 0.0 - 1.0 K/UL    Eosinophils Absolute 0.0 0.0 - 0.4 K/UL    Basophils Absolute 0.0 0.0 - 0.1 K/UL    Absolute Immature Granulocyte 0.1 (H) 0.00 - 0.04 K/UL    Differential Type SMEAR SCANNED      RBC Comment TEARDROP CELLS  PRESENT        RBC Comment ANISOCYTOSIS  1+       Magnesium    Collection Time: 03/28/24  5:23 AM   Result Value Ref Range    Magnesium 1.4 (L) 1.6 - 2.4 mg/dL   Hepatitis B Surface Antibody    Collection Time: 03/28/24  5:23 AM   Result Value Ref Range    Hep B S Ab <3.10 mIU/mL    Hep B S Ab Interp NONREACTIVE NR     Hepatitis B Surface Antigen    Collection Time: 03/28/24  5:23 AM   Result Value Ref Range    Hepatitis B Surface Ag <0.10 Index    Hep B S Ag Interp Negative NEG     Protime-INR    Collection Time: 03/28/24  5:23 AM   Result Value Ref Range    INR 3.1 (H) 0.9 - 1.1      Protime 29.8 (H) 9.0 - 11.1 sec       Imaging:    IR NONTUNNELED VASCULAR CATHETER > 5 YEARS   Final Result   Technically successful ultrasound and fluoroscopic guided placement of a right   internal jugular vein temporary central venous catheter.  The catheter is ready   for immediate use.      US ABDOMEN LIMITED   Final Result   No ascites.  Paracentesis not performed.      CT ABDOMEN PELVIS WO CONTRAST Additional Contrast? Radiologist Recommendation   Final Result      1. Moderate ascites with peritoneal dialysis catheter in place likely

## 2024-03-28 NOTE — FLOWSHEET NOTE
Primary RN SBAR: ARISTIDES Dueñas, RN  Patient Education: Procedural, site care, lab results    Hepatitis B Surface Ag   Date/Time Value Ref Range Status   03/28/2024 05:23 AM <0.10 Index Final     Hep B S Ab   Date/Time Value Ref Range Status   03/28/2024 05:23 AM <3.10 mIU/mL Final       03/28/24 1615   Vitals   BP (!) 113/56   Pulse 84   Respirations 13   SpO2 100 %   Observations & Evaluations   Level of Consciousness 0   Oriented X 4   Heart Rhythm Regular   Respiratory Quality/Effort Unlabored   O2 Device Nasal cannula   Skin Color Other (comment)  (appropriate)   Skin Condition/Temp Dry;Warm   Appetite Good   Abdomen Inspection Obese;Rounded;Taut   Edema Generalized;Right lower extremity;Left lower extremity   Edema Generalized +1   RLE Edema +1   LLE Edema +1   Peritoneal Dialysis Catheter Mid lower abdomen   No placement date or time found.   Present on Admission/Arrival: Yes  Catheter Location: Mid lower abdomen   Status Accessed   Site Condition Clean, dry, intact   Dressing Status New dressing applied;Clean, dry & intact   Dressing Gauze   Date of Last Dressing Change 03/28/24   Dialysis Type Continuous cycling   Exit Site Condition good   Catheter Care Given Yes  (2 min alcavis scrub / soak)   Cycler   Verification of Prescription CCPD   Informed Consent  Yes   Total Volume Programmed 86799 mL   Therapy Time (Hours:Minutes) 9:00   Cycler Type Yen [facility]   Fill Volume 3000 mL   Last Fill Volume 0 mL   Dextrose Setting Same (Nonextraneal)   I Drain Alarm 0 mL   Number of Cycles 5   Bag Volume 6000 mL   Number of Bags Used 3   Dianeal Solution Other (Comment)  (Dextrose 1.5% in 6000 mL)        03/28/24 1615   Pain Assessment   Pain Assessment None - Denies Pain   Treatment   Time On 1615   Time Off 0115   Technical Checks   All Connections Secure Yes   ICEBOAT I;C;E;B;O;A;T   Machine Alarm Self Test Completed;Passed  (settings verified)     At bedside to initiate CCPD tx for the night. Pt orders, notes,

## 2024-03-28 NOTE — PROGRESS NOTES
Affiliated with [facility]  [address] [id number]   Office [phone], Fax [phone]       Subjective / Objective     Subjective:  Overnight events: NAEON.    Patient seen and examined at bedside.  She states that overall she is feeling slightly better.  Is continuing to have SOB and abdominal pain which remain unchanged from previous.  She is also somewhat anxious as she was unaware of her diagnoses of Afib and cirrhosis, and is feeling somewhat overwhelmed.  She states he is constipated and it has been 5 days since her last bowel movement.      Vital Signs  Vitals:    03/28/24 0800   BP: 128/65   Pulse: 88   Resp: 23   Temp: 97.7 °F (36.5 °C)   SpO2: 100%         Physical Exam  General: No acute distress. Alert. Cooperative.   Head: Normocephalic. Atraumatic.    ENT:             Moist mucous membranes. Sclera white. PERRL.   Respiratory: CTAB. No w/r/r. No accessory muscle use.   Cardiovascular: RRR. Normal S1,S2. No m/r/g.    GI: Abdomen soft, diffusely tender on palpation but most severe in the epigastric area and LLQ with guarding. No rebound tenderness. Nondistended.  PD catheter in place. No evidence of rash or infection on skin.    Extremities: Bilateral LE 2+ pitting edema, R > L.  Right LE has overlying chronic skin changes.    Skin:  Neuro: Warm. No rashes.  A&Ox4. No functional neurologic deficit noted.        I/O:    Intake/Output Summary (Last 24 hours) at 3/28/2024 0838  Last data filed at 3/28/2024 0809  Gross per 24 hour   Intake 1396.18 ml   Output 1900 ml   Net -503.82 ml        CBC:  Recent Labs     03/26/24  1725 03/27/24  0429 03/28/24  0523   WBC 16.8* 15.8* 13.4*   HGB 8.1* 7.4* 7.8*   HCT 25.7* 23.6* 24.3*    182 193       Metabolic Panel:  Recent Labs     03/26/24  1725 03/27/24  0429 03/27/24  0432 03/28/24  0523     --  137 133*   K 3.4*  --  3.8 4.2     --  104 100  Each port was aspirated and  flushed with sterile saline.  The catheter was secured to the patient's skin  with 2-0 non-absorbable suture.  A dry sterile dressing was applied.  There were  no immediate complications.  The patient tolerated the procedure without  difficulty.     SEDATION:  None    COMPLICATIONS:  None    ESTIMATED BLOOD LOSS:  < 5 ml    AIR KERMA:  0.5 mGy  Impression: Technically successful ultrasound and fluoroscopic guided placement of a right  internal jugular vein temporary central venous catheter.  The catheter is ready  for immediate use.  US ABDOMEN LIMITED  Narrative: EXAM:  US ABDOMEN LIMITED   INDICATION:  Ascites    FINDINGS:  Limited ultrasound imaging of the abdomen failed to demonstrate a  collection of fluid amenable to paracentesis.  Impression: No ascites.  Paracentesis not performed.              Assessment and Plan     Elizabeth Hopkins is a 68 y.o. female with pmhx of ESRD, CAD, HTN, VTE, DM, ILD, pancreatitis who is admitted for hypotension and sepsis.     Concern for septic shock: Improving. Potential sources include SBP vs PD site cellulitis. On admission meeting 3/4 SIRS criteria HR, RR, WBC 16.8. LA 2.9). CT mod ascites, right lateral abd wall cellulitis. Unable to give 30cc/kg,but LA did not respond to albumin x5 and 500cc NS bolus. Procal 0.6, LA trending up, possible 2/2 cirrhosis. S/p ceftriaxone 3/26. RPP negative. 1/4 Blood culture positive for Staph Epi, likely contaminant.   - IV vanc/cefepime/flagyl/fluconazole (3/26-) for empiric SBP coverage.   - IV levo gtt for MAP >65, patient requiring 5 mcg at this time. Wean as tolerated  - follow blood cultures  - follow paracentesis culture.      Hypotension acute on chronic. Usually worse after dialysis.   - levophed gtt as above  - continue midodrine 10mg TID  - continue Solu-Cortef 50 mg q 6  - s/p albumin infusions  - serum cortisol pending     ESRD on PD follows with Dr. Parker.  - nephro consulted, appreciate

## 2024-03-28 NOTE — PROGRESS NOTES
1420- 2 RNs attempted to collect blood cultures and were unsuccessful. Vascular access contacted and attempted, unsuccessful to draw cultures.

## 2024-03-28 NOTE — PROGRESS NOTES
[facility] Dosing Services: 03/28/24    Consult for Warfarin Dosing by Pharmacy by Dr. Marti  Consult provided for this 68 y.o. female , for indication of Hx of VTE  Day of Therapy: Resumed from PTA medication list  Home Regimen: Warfarin 2.5 mg on Wednesdays and Fridays, and warfarin 1 mg on the other 5 days of the week  Dose to achieve an INR goal of 2-3    Assessment/Plan:  H/H and platelets are stable - note chronic anemia, no documentation of any current bleeding  Today's INR of 3.1 is above therapeutic range  Will hold warfarin dose tonight due to high INR, with plan to likely resume warfarin tomorrow  INR ordered daily with AM labs    Significant drug interactions, such as enoxaparin: Cefepime, allopurinol, erythromycin, steroids  PT/INR Lab Results   Component Value Date/Time    INR 3.1 03/28/2024 05:23 AM    INR 1.8 05/03/2023 02:34 PM    INREXT 4.1 05/20/2021 12:00 AM      Platelets Lab Results   Component Value Date/Time     03/28/2024 05:23 AM      H/H Lab Results   Component Value Date/Time    HGB 7.8 03/28/2024 05:23 AM        Pharmacy to follow daily and will provide subsequent Warfarin dosing based on clinical status.  Dario Mendieta [facility]) Contact information [phone]

## 2024-03-28 NOTE — PROGRESS NOTES
Responded to Miss Hopkins request for a  visit in the ICU.  Reviewed chart prior to visit.  Her  was in the room.  She shared she is to have a procedure that has been a difficult one for her in the past.  We talked about releasing some of the apprehension, holding onto the solid relationships she has in her , family and life long strength she receives from [place].  She belongs to a [employer] in [facility] and the  is very supportive.  She requested prayer for the upcoming surgeries.  Provided spiritual presence and prayer.  Contact Spiritual Care for any further referrals.   Stacie Khalil MDiv., MS., [facility]  Page a  [phone]

## 2024-03-28 NOTE — PLAN OF CARE
Problem: Physical Therapy - Adult  Goal: By Discharge: Performs mobility at highest level of function for planned discharge setting.  See evaluation for individualized goals.  Description: FUNCTIONAL STATUS PRIOR TO ADMISSION: Reports limited mobility following a right TKA 8/2023 exacerbated following a recent 3 week hospital stay. She returned home approximately 1 week ago and has been primarily using her wheelchair. She reports transferring with assist x2 from her  and son. Recently walking short distances using a RW with HHPT.    HOME SUPPORT PRIOR TO ADMISSION: The patient lived with her  and adult son who assisted with transfers to her wheelchair and tub.    Physical Therapy Goals  Initiated 3/28/2024  1.  Patient will move from supine to sit and sit to supine in bed with modified independence within 7 day(s).    2.  Patient will perform sit to stand with modified independence within 7 day(s).  3.  Patient will transfer from bed to chair and chair to bed with supervision/set-up using the least restrictive device within 7 day(s).  4.  Patient will ambulate with supervision/set-up for 15 feet with the least restrictive device within 7 day(s).       3/28/2024 1722 by Ramon Cadena, PT  Outcome: Progressing  3/28/2024 1716 by Ramon Cadena, PT  Outcome: Progressing   PHYSICAL THERAPY EVALUATION    Patient: Elizabeth Hopkins (68 y.o. female)  Date: 3/28/2024  Primary Diagnosis: Hypotension [I95.9]  Hypotension, unspecified hypotension type [I95.9]  Chronic kidney disease, unspecified CKD stage [N18.9]       Precautions:                        ASSESSMENT :   DEFICITS/IMPAIRMENTS:   The patient is limited by decreased functional mobility, ROM, strength, activity tolerance, balance     Based on the impairments listed above, the patient presents below her baseline following admission for hypotension and CKD. BP carefully monitored with position changes during session and remained stable. The

## 2024-03-28 NOTE — PROGRESS NOTES
[facility]OCELINE VALLE [facility]    Elizabeth Hopkins  YOB: 1955          Assessment & Plan:     ESRD on CCPD ([place])  Hypotension  Cirrhosis  ILD  CAD    Rec:  [place] PD well. UF 1900 with 1.5%  PD fluid not c/w peritonitis (PD TNC 5)  Continue CCPD       Subjective:   CC: ESRD  HPI: Tolerated dialysis well. Good uf with 1.5%. Remains on pressors.  Current Facility-Administered Medications   Medication Dose Route Frequency    magnesium oxide (MAG-OX) tablet 400 mg  400 mg Oral BID    hydrocortisone sodium succinate PF (SOLU-CORTEF) injection 50 mg  50 mg IntraVENous Q8H    dianeal lo-aura 1.5% 6,000 mL  6,000 mL IntraPERitoneal QPM    allopurinol (ZYLOPRIM) tablet 100 mg  100 mg Oral Daily    atorvastatin (LIPITOR) tablet 80 mg  80 mg Oral Nightly    erythromycin (EES) 200 MG/5ML suspension 200 mg  200 mg Oral 4x Daily PC & HS    folic acid (FOLVITE) tablet 1,000 mcg  1,000 mcg Oral Daily    [Held by provider] metoprolol succinate (TOPROL XL) extended release tablet 25 mg  25 mg Oral QHS    potassium bicarb-citric acid (EFFER-K) effervescent tablet 20 mEq  20 mEq Oral Daily    docusate sodium (COLACE) capsule 100 mg  100 mg Oral Daily    warfarin placeholder: dosing by pharmacy   Other RX Placeholder    midodrine (PROAMATINE) tablet 10 mg  10 mg Oral TID    senna (SENOKOT) tablet 8.6 mg  1 tablet Oral Nightly    polyethylene glycol (GLYCOLAX) packet 17 g  17 g Oral BID    oxyCODONE-acetaminophen (PERCOCET)  MG per tablet 1 tablet  1 tablet Oral Q6H PRN    cefepime (MAXIPIME) 1,000 mg in sodium chloride 0.9 % 50 mL IVPB (mini-bag)  1,000 mg IntraVENous Q24H    norepinephrine (LEVOPHED) 16 mg in sodium chloride 0.9 % 250 mL infusion  0.5-20 mcg/min IntraVENous Continuous    fentaNYL (SUBLIMAZE) injection 50 mcg  50 mcg IntraVENous Once    midazolam (VERSED) injection 2 mg  2 mg IntraVENous Once    gentamicin (GARAMYCIN) 0.1 % cream   Topical Daily before dinner

## 2024-03-28 NOTE — FLOWSHEET NOTE
Peritoneal Dialysis Disconnection / [phone]    Primary RN SBAR: Dickson, RN  Patient Education: access/site care    Hepatitis B Surface Ag   Date/Time Value Ref Range Status   09/07/2022 02:42 AM <0.10  Negative   Index Final     Hep B S Ab   Date/Time Value Ref Range Status   09/06/2022 11:34 AM NONREACTIVE NR   Final     Comment:     (NOTE)  The [employer] Centaur Anti-HBs2 assay is traceable to the World Health   Organization (WHO) Hepatitis B Immunoglobulin 1st International   Reference Preparation (1977). Samples with a calculated value of 10   mIU/mL or greater are considered reactive (protective) in accordance   with the CDC guidelines. The accepted criteria for immunity to HBV is   anti-HBs activity greater than or equal to 10 mIU/mL, as defined by   the WHO International Reference Preparation.  Assay performance has not been established in pregnant women,   patients who are immunosuppressed or immunocompromised, nor have   performance characteristics been established in conjunction with   other 's assays for specific HBV serologic markers. This   assay does not differentiate between vaccine induced immune response   and a response due to infection with HBV. Passively acquired anti-HBs   may be identified following patient transfusion, receipt of   immunoglobulin products, etc.         03/28/24 0436   Peritoneal Dialysis Catheter Mid lower abdomen   No placement date or time found.   Present on Admission/Arrival: Yes  Catheter Location: Mid lower abdomen   Status Deaccessed   Site Condition Clean, dry, intact   Dressing Status Clean, dry & intact   Dressing Gauze   Date of Last Dressing Change 03/27/24   Dialysis Type Continuous cycling   Exit Site Condition excellent   Catheter Care Given Yes   Post-Treatment (Cycler)   Average Dwell Time (Hours:Minutes) 1:11   Lost Dwell Time (Hours:Minutes) 0:04   Effluent Appearance Cloudy;Yellow   PD Output (mL) 1900 mL  (ID 1947 ml + TUF (-47 ml)

## 2024-03-28 NOTE — PROGRESS NOTES
attended rounds in the ICU as part of the Interdisciplinary team where the patient's ongoing care was discussed. Please contact [facility] for further referrals.    Smith Atkins MDiv  Staff   Paging Service [phone] (ROSAMARIA)

## 2024-03-28 NOTE — CARE COORDINATION
Transition of Care Plan:    RUR: 23%  Prior Level of Functioning: assistance with mobility  Disposition: following PT and OT for disposition recommendations    If home health is prescribed ,[facility] has accepted pt for resumption f home health services.    If SNF or inpatient rehab are recommended,I will discuss preferences with pt.     Today:  Levophed gtt  PD as ordered by nephrology    Home DME includes:  Electric scooter,cane,rollator,electric scooter,rolling walker,CPaP (through [employer]) and home oxygen ([employer])  Pt is in 3 liters oxygen continuously    Pt performs PD @ home.     Code Status: Full Code   Patient's Primary Decision Maker is: (P) Legal Next of Kin (-Uzair Hopkins @ [phone])    Tegan Solorzano RN

## 2024-03-28 NOTE — PLAN OF CARE
Problem: Occupational Therapy - Adult  Goal: By Discharge: Performs self-care activities at highest level of function for planned discharge setting.  See evaluation for individualized goals.  Description: FUNCTIONAL STATUS PRIOR TO ADMISSION: Patient reports a history of chronic back pain due to DDD and b/l shoulder pain due severe OA.  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  She had a recent 3 week hospital stay and returned home for ~1 week before current admission.  At home, she was ambulating some using a rollator in PT but otherwise was using a wheelchair or motorized scooter to mobilize with assistance for transfers.  Her wheelchair fits in her bathroom and she required assistance with tub transfer bench and toilet transfers (with [facility] over toilet frame). She did not require ADL assist outside of transfers.    HOME SUPPORT: Patient resided at home with her  and son to provide assistance.     Occupational Therapy Goals:  Initiated 3/28/2024  1.  Patient will perform bathing with Supervision within 7 day(s).  2.  Patient will perform lower body dressing using AE PRN with Supervision within 7 day(s).  4.  Patient will perform toilet transfers to BSC with Supervision  within 7 day(s).  5.  Patient will perform all aspects of toileting with Supervision within 7 day(s).  6.  Patient will participate in upper extremity therapeutic exercise/activities with Supervision for 10 minutes within 7 day(s).    7.  Patient will utilize energy conservation techniques during functional activities with verbal cues within 7 day(s).  7.  Patient will utilize fall prevention techniques during functional activities with verbal cues within 7 day(s).      3/28/2024 1343 by Aston Montague, OT  Outcome: Progressing  3/28/2024 1331 by Aston Montague, OT  Outcome: Progressing  OCCUPATIONAL THERAPY  Inpatient   How much help is needed for putting on and taking off regular lower body clothing?: A Lot  How much help is needed for bathing (which includes washing, rinsing, drying)?: A Lot  How much help is needed for toileting (which includes using toilet, bedpan, or urinal)?: A Lot  How much help is needed for putting on and taking off regular upper body clothing?: None  How much help is needed for taking care of personal grooming?: None  How much help for eating meals?: None  AM-[facility] Inpatient Daily Activity Raw Score: 18  AM-PAC Inpatient ADL T-Scale Score : 38.66  ADL Inpatient CMS 0-100% Score: 46.65  ADL Inpatient CMS G-Code Modifier : CK     Interpretation of Tool:  Represents clinically-significant functional categories (i.e. Activities of daily living).  Cutoff score 39.4 (19) correlates to a good likelihood of discharging home versus a facility  She Parra, Destini Ssoa, Wong Villalba, Aydee Azar, Navneet Riddle, Meño Parra, AM-PAC “6-Clicks” Functional Assessment Scores Predict Acute Care Hospital Discharge Destination, Physical Therapy, Volume 94, Issue 9, 1 September 2014, Pages 7064-0469, [email or web address]://[email or web address]       Pain Rating:  Patient reported chronic knee, shoulder, and back pain    Activity Tolerance:   Fair     After treatment:   Patient left in no apparent distress in bed and Call bell within reach    COMMUNICATION/EDUCATION:   The patient's plan of care was discussed with: physical therapist and registered nurse         Thank you for this referral.  Aston Montague OT  Minutes: 34    Occupational Therapy Evaluation Charge Determination   History Examination Decision-Making   LOW Complexity : Brief history review  MEDIUM Complexity: 3-5 Performance deficits relating to physical, cognitive, or psychosocial skills that result in activity limitations and/or participation restrictions MEDIUM Complexity: Patient may present with comorbidities that affect  Monitored Anesthesia Care with sedation

## 2024-03-28 NOTE — PROGRESS NOTES
Affiliated with [facility] and [facility]       Resident Progress Note in Brief    S: Patient seen and examined at bedside. Patient states that her condition is largely unchanged at this time. She continues to have pain in the abdomen and BLEs, R>L. Denies worsening of symptoms at this time.    O:  BP (!) 131/43   Pulse 78   Temp 98.6 °F (37 °C) (Oral)   Resp 18   Ht 1.778 m (5' 10\")   Wt 127.9 kg (282 lb)   SpO2 99%   BMI 40.46 kg/m²   Physical Examination:   General appearance - alert, well appearing, and in no distress  Chest - clear to auscultation, no wheezes, rales or rhonchi, symmetric air entry  Heart - normal rate, regular rhythm, normal S1, S2, no murmurs, rubs, clicks or gallops,   Abdomen - Soft, TTP diffusely, worst in the LLQ.  Neurological - alert, oriented, normal speech, no focal findings  Extremities - peripheral pulses normal, 2+ pitting edema to the knee, no clubbing or cyanosis. Old surgical wound of right knee noted, no wounds noted on either extremity    A/P:     Elizabeth Hopkins is a 68 y.o. female with pmhx of ESRD, CAD, HTN, VTE, DM, ILD, pancreatitis who is admitted for hypotension and sepsis.     Severe sepsis/ shock potential sources include SBP vs PD site cellulitis. Hypotensive at home and on arrival. Mildly tachypneic/ tachycardic. WBC 16.8. LA 2.9. CT mod ascites, right lateral abd wall cellulitis. Procal 0.6, LA trending up, mostly likely 2/2 cirrhosis  - CTX (3/26), vanc/flagyl (3/26-), cefepime (3/27-)  - levo gtt for MAP >65, patient requiring 15mcg at this time, central line in place  - follow blood cultures  - RVP negative  - Intensivist consulted, appreciate recs     Hypotension acute on chronic. Usually worse after dialysis.   - levophed gtt as above  - continue midodrine 10mg TID  - Hydrocortisone  - Cortisol level pending  - albumin infusion      ESRD on PD follows with Dr. Parker.  - nephro consulted, appreciate recs  - continue PD qhs   - continue folic acid,

## 2024-03-29 ENCOUNTER — APPOINTMENT (OUTPATIENT)
Facility: HOSPITAL | Age: 69
DRG: 871 | End: 2024-03-29
Payer: MEDICARE

## 2024-03-29 PROBLEM — R65.21 SEPTIC SHOCK (HCC): Status: ACTIVE | Noted: 2024-03-29

## 2024-03-29 PROBLEM — E27.40 ADRENAL INSUFFICIENCY (HCC): Status: ACTIVE | Noted: 2024-03-29

## 2024-03-29 PROBLEM — J96.11 CHRONIC HYPOXIC RESPIRATORY FAILURE (HCC): Status: ACTIVE | Noted: 2024-03-29

## 2024-03-29 PROBLEM — A41.9 SEPTIC SHOCK (HCC): Status: ACTIVE | Noted: 2024-03-29

## 2024-03-29 LAB
ALBUMIN SERPL-MCNC: 1.8 G/DL (ref 3.5–5)
ALBUMIN/GLOB SERPL: 0.6 (ref 1.1–2.2)
ALP SERPL-CCNC: 162 U/L (ref 45–117)
ALT SERPL-CCNC: 9 U/L (ref 12–78)
ANION GAP SERPL CALC-SCNC: 7 MMOL/L (ref 5–15)
AST SERPL-CCNC: 14 U/L (ref 15–37)
BASOPHILS # BLD: 0 K/UL (ref 0–0.1)
BASOPHILS NFR BLD: 0 % (ref 0–1)
BILIRUB SERPL-MCNC: 0.7 MG/DL (ref 0.2–1)
BUN SERPL-MCNC: 18 MG/DL (ref 6–20)
BUN/CREAT SERPL: 3 (ref 12–20)
CALCIUM SERPL-MCNC: 8.3 MG/DL (ref 8.5–10.1)
CHLORIDE SERPL-SCNC: 100 MMOL/L (ref 97–108)
CO2 SERPL-SCNC: 26 MMOL/L (ref 21–32)
CREAT SERPL-MCNC: 5.78 MG/DL (ref 0.55–1.02)
DIFFERENTIAL METHOD BLD: ABNORMAL
EOSINOPHIL # BLD: 0 K/UL (ref 0–0.4)
EOSINOPHIL NFR BLD: 0 % (ref 0–7)
ERYTHROCYTE [DISTWIDTH] IN BLOOD BY AUTOMATED COUNT: 19.3 % (ref 11.5–14.5)
GLOBULIN SER CALC-MCNC: 3.1 G/DL (ref 2–4)
GLUCOSE SERPL-MCNC: 202 MG/DL (ref 65–100)
HCT VFR BLD AUTO: 22.4 % (ref 35–47)
HGB BLD-MCNC: 7.3 G/DL (ref 11.5–16)
IMM GRANULOCYTES # BLD AUTO: 0.2 K/UL (ref 0–0.04)
IMM GRANULOCYTES NFR BLD AUTO: 1 % (ref 0–0.5)
INR PPP: 3.2 (ref 0.9–1.1)
INR PPP: 3.3 (ref 0.9–1.1)
LACTATE SERPL-SCNC: 3.5 MMOL/L (ref 0.4–2)
LACTATE SERPL-SCNC: 3.7 MMOL/L (ref 0.4–2)
LACTATE SERPL-SCNC: 4.3 MMOL/L (ref 0.4–2)
LACTATE SERPL-SCNC: 4.4 MMOL/L (ref 0.4–2)
LACTATE SERPL-SCNC: 4.5 MMOL/L (ref 0.4–2)
LYMPHOCYTES # BLD: 0.7 K/UL (ref 0.8–3.5)
LYMPHOCYTES NFR BLD: 5 % (ref 12–49)
MAGNESIUM SERPL-MCNC: 1.6 MG/DL (ref 1.6–2.4)
MCH RBC QN AUTO: 34 PG (ref 26–34)
MCHC RBC AUTO-ENTMCNC: 32.6 G/DL (ref 30–36.5)
MCV RBC AUTO: 104.2 FL (ref 80–99)
MONOCYTES # BLD: 0.1 K/UL (ref 0–1)
MONOCYTES NFR BLD: 1 % (ref 5–13)
NEUTS SEG # BLD: 11.6 K/UL (ref 1.8–8)
NEUTS SEG NFR BLD: 93 % (ref 32–75)
NRBC # BLD: 0.07 K/UL (ref 0–0.01)
NRBC BLD-RTO: 0.6 PER 100 WBC
PLATELET # BLD AUTO: 198 K/UL (ref 150–400)
PMV BLD AUTO: 11 FL (ref 8.9–12.9)
POTASSIUM SERPL-SCNC: 4.3 MMOL/L (ref 3.5–5.1)
PROT SERPL-MCNC: 4.9 G/DL (ref 6.4–8.2)
PROTHROMBIN TIME: 30.8 SEC (ref 9–11.1)
PROTHROMBIN TIME: 31.8 SEC (ref 9–11.1)
RBC # BLD AUTO: 2.15 M/UL (ref 3.8–5.2)
SODIUM SERPL-SCNC: 133 MMOL/L (ref 136–145)
WBC # BLD AUTO: 12.5 K/UL (ref 3.6–11)

## 2024-03-29 PROCEDURE — 6370000000 HC RX 637 (ALT 250 FOR IP): Performed by: ANESTHESIOLOGY

## 2024-03-29 PROCEDURE — 97530 THERAPEUTIC ACTIVITIES: CPT

## 2024-03-29 PROCEDURE — 36415 COLL VENOUS BLD VENIPUNCTURE: CPT

## 2024-03-29 PROCEDURE — 2000000000 HC ICU R&B

## 2024-03-29 PROCEDURE — 90945 DIALYSIS ONE EVALUATION: CPT

## 2024-03-29 PROCEDURE — 2580000003 HC RX 258

## 2024-03-29 PROCEDURE — 6360000002 HC RX W HCPCS

## 2024-03-29 PROCEDURE — 94761 N-INVAS EAR/PLS OXIMETRY MLT: CPT

## 2024-03-29 PROCEDURE — 6370000000 HC RX 637 (ALT 250 FOR IP): Performed by: HOSPITALIST

## 2024-03-29 PROCEDURE — 6370000000 HC RX 637 (ALT 250 FOR IP)

## 2024-03-29 PROCEDURE — 74174 CTA ABD&PLVS W/CONTRAST: CPT

## 2024-03-29 PROCEDURE — 83605 ASSAY OF LACTIC ACID: CPT

## 2024-03-29 PROCEDURE — 6360000004 HC RX CONTRAST MEDICATION: Performed by: RADIOLOGY

## 2024-03-29 PROCEDURE — 2580000003 HC RX 258: Performed by: INTERNAL MEDICINE

## 2024-03-29 PROCEDURE — 2500000003 HC RX 250 WO HCPCS: Performed by: HOSPITALIST

## 2024-03-29 PROCEDURE — 2700000000 HC OXYGEN THERAPY PER DAY

## 2024-03-29 PROCEDURE — P9045 ALBUMIN (HUMAN), 5%, 250 ML: HCPCS

## 2024-03-29 PROCEDURE — 2580000003 HC RX 258: Performed by: ANESTHESIOLOGY

## 2024-03-29 PROCEDURE — 6360000002 HC RX W HCPCS: Performed by: INTERNAL MEDICINE

## 2024-03-29 PROCEDURE — 99232 SBSQ HOSP IP/OBS MODERATE 35: CPT | Performed by: FAMILY MEDICINE

## 2024-03-29 PROCEDURE — 83735 ASSAY OF MAGNESIUM: CPT

## 2024-03-29 PROCEDURE — 85610 PROTHROMBIN TIME: CPT

## 2024-03-29 PROCEDURE — 2580000003 HC RX 258: Performed by: HOSPITALIST

## 2024-03-29 PROCEDURE — 97535 SELF CARE MNGMENT TRAINING: CPT

## 2024-03-29 PROCEDURE — 80053 COMPREHEN METABOLIC PANEL: CPT

## 2024-03-29 PROCEDURE — 85025 COMPLETE CBC W/AUTO DIFF WBC: CPT

## 2024-03-29 PROCEDURE — 2500000003 HC RX 250 WO HCPCS

## 2024-03-29 PROCEDURE — A4722 DIALYS SOL FLD VOL > 1999CC: HCPCS | Performed by: INTERNAL MEDICINE

## 2024-03-29 PROCEDURE — 6360000002 HC RX W HCPCS: Performed by: HOSPITALIST

## 2024-03-29 PROCEDURE — 6370000000 HC RX 637 (ALT 250 FOR IP): Performed by: INTERNAL MEDICINE

## 2024-03-29 RX ORDER — MAGNESIUM SULFATE HEPTAHYDRATE 40 MG/ML
2000 INJECTION, SOLUTION INTRAVENOUS ONCE
Status: COMPLETED | OUTPATIENT
Start: 2024-03-29 | End: 2024-03-29

## 2024-03-29 RX ORDER — FLUDROCORTISONE ACETATE 0.1 MG/1
0.1 TABLET ORAL 2 TIMES DAILY
Status: DISCONTINUED | OUTPATIENT
Start: 2024-03-29 | End: 2024-04-02 | Stop reason: HOSPADM

## 2024-03-29 RX ORDER — ALBUMIN, HUMAN INJ 5% 5 %
SOLUTION INTRAVENOUS
Status: COMPLETED
Start: 2024-03-29 | End: 2024-03-29

## 2024-03-29 RX ORDER — DIPHENHYDRAMINE HYDROCHLORIDE 50 MG/ML
10 INJECTION INTRAMUSCULAR; INTRAVENOUS EVERY 6 HOURS PRN
Status: DISCONTINUED | OUTPATIENT
Start: 2024-03-29 | End: 2024-03-29

## 2024-03-29 RX ORDER — DIPHENHYDRAMINE HCL 25 MG
25 CAPSULE ORAL EVERY 6 HOURS PRN
Status: DISCONTINUED | OUTPATIENT
Start: 2024-03-29 | End: 2024-04-02 | Stop reason: HOSPADM

## 2024-03-29 RX ORDER — SODIUM CHLORIDE, SODIUM LACTATE, POTASSIUM CHLORIDE, AND CALCIUM CHLORIDE .6; .31; .03; .02 G/100ML; G/100ML; G/100ML; G/100ML
500 INJECTION, SOLUTION INTRAVENOUS ONCE
Status: COMPLETED | OUTPATIENT
Start: 2024-03-29 | End: 2024-03-29

## 2024-03-29 RX ORDER — SODIUM CHLORIDE, SODIUM LACTATE, POTASSIUM CHLORIDE, CALCIUM CHLORIDE 600; 310; 30; 20 MG/100ML; MG/100ML; MG/100ML; MG/100ML
INJECTION, SOLUTION INTRAVENOUS CONTINUOUS
Status: DISCONTINUED | OUTPATIENT
Start: 2024-03-29 | End: 2024-03-30

## 2024-03-29 RX ORDER — ALBUMIN, HUMAN INJ 5% 5 %
12.5 SOLUTION INTRAVENOUS ONCE
Status: COMPLETED | OUTPATIENT
Start: 2024-03-29 | End: 2024-03-29

## 2024-03-29 RX ORDER — PANTOPRAZOLE SODIUM 40 MG/1
40 TABLET, DELAYED RELEASE ORAL
Status: DISCONTINUED | OUTPATIENT
Start: 2024-03-29 | End: 2024-04-02 | Stop reason: HOSPADM

## 2024-03-29 RX ORDER — SODIUM CHLORIDE, SODIUM LACTATE, CALCIUM CHLORIDE, MAGNESIUM CHLORIDE AND DEXTROSE 2.5; 538; 448; 18.3; 5.08 G/100ML; MG/100ML; MG/100ML; MG/100ML; MG/100ML
6000 INJECTION, SOLUTION INTRAPERITONEAL EVERY 24 HOURS
Status: DISCONTINUED | OUTPATIENT
Start: 2024-03-29 | End: 2024-04-02 | Stop reason: HOSPADM

## 2024-03-29 RX ORDER — MIDODRINE HYDROCHLORIDE 5 MG/1
15 TABLET ORAL 3 TIMES DAILY
Status: DISCONTINUED | OUTPATIENT
Start: 2024-03-29 | End: 2024-04-02 | Stop reason: HOSPADM

## 2024-03-29 RX ADMIN — GENTAMICIN SULFATE: 1 CREAM TOPICAL at 15:24

## 2024-03-29 RX ADMIN — MIDODRINE HYDROCHLORIDE 15 MG: 5 TABLET ORAL at 08:52

## 2024-03-29 RX ADMIN — Medication 400 MG: at 21:03

## 2024-03-29 RX ADMIN — DOCUSATE SODIUM 100 MG: 100 CAPSULE, LIQUID FILLED ORAL at 08:54

## 2024-03-29 RX ADMIN — FOLIC ACID 1000 MCG: 1 TABLET ORAL at 08:52

## 2024-03-29 RX ADMIN — WATER 40 MG: 1 INJECTION INTRAMUSCULAR; INTRAVENOUS; SUBCUTANEOUS at 02:18

## 2024-03-29 RX ADMIN — POLYETHYLENE GLYCOL 3350 17 G: 17 POWDER, FOR SOLUTION ORAL at 21:04

## 2024-03-29 RX ADMIN — SODIUM CHLORIDE, POTASSIUM CHLORIDE, SODIUM LACTATE AND CALCIUM CHLORIDE 500 ML: 600; 310; 30; 20 INJECTION, SOLUTION INTRAVENOUS at 18:51

## 2024-03-29 RX ADMIN — Medication 400 MG: at 08:53

## 2024-03-29 RX ADMIN — PANTOPRAZOLE SODIUM 40 MG: 40 TABLET, DELAYED RELEASE ORAL at 21:08

## 2024-03-29 RX ADMIN — SODIUM CHLORIDE, SODIUM LACTATE, CALCIUM CHLORIDE, MAGNESIUM CHLORIDE AND DEXTROSE 6000 ML: 2.5; 538; 448; 18.3; 5.08 INJECTION, SOLUTION INTRAPERITONEAL at 15:24

## 2024-03-29 RX ADMIN — ERYTHROMYCIN 200 MG: 200 SUSPENSION ORAL at 13:29

## 2024-03-29 RX ADMIN — HYDROCORTISONE SODIUM SUCCINATE 50 MG: 100 INJECTION, POWDER, FOR SOLUTION INTRAMUSCULAR; INTRAVENOUS at 06:08

## 2024-03-29 RX ADMIN — ALBUMIN, HUMAN INJ 5% 12.5 G: 5 SOLUTION at 14:20

## 2024-03-29 RX ADMIN — ONDANSETRON 4 MG: 2 INJECTION INTRAMUSCULAR; INTRAVENOUS at 02:11

## 2024-03-29 RX ADMIN — FLUDROCORTISONE ACETATE 0.1 MG: 0.1 TABLET ORAL at 21:03

## 2024-03-29 RX ADMIN — SODIUM CHLORIDE, SODIUM LACTATE, CALCIUM CHLORIDE, MAGNESIUM CHLORIDE AND DEXTROSE 6000 ML: 1.5; 538; 448; 18.3; 5.08 INJECTION, SOLUTION INTRAPERITONEAL at 15:24

## 2024-03-29 RX ADMIN — MIDODRINE HYDROCHLORIDE 15 MG: 5 TABLET ORAL at 13:28

## 2024-03-29 RX ADMIN — HYDROCORTISONE SODIUM SUCCINATE 50 MG: 100 INJECTION, POWDER, FOR SOLUTION INTRAMUSCULAR; INTRAVENOUS at 21:01

## 2024-03-29 RX ADMIN — SODIUM CHLORIDE, PRESERVATIVE FREE 10 ML: 5 INJECTION INTRAVENOUS at 21:05

## 2024-03-29 RX ADMIN — FLUDROCORTISONE ACETATE 0.1 MG: 0.1 TABLET ORAL at 10:33

## 2024-03-29 RX ADMIN — DIPHENHYDRAMINE HYDROCHLORIDE 50 MG: 50 INJECTION INTRAMUSCULAR; INTRAVENOUS at 02:05

## 2024-03-29 RX ADMIN — IOPAMIDOL 100 ML: 755 INJECTION, SOLUTION INTRAVENOUS at 03:35

## 2024-03-29 RX ADMIN — ERYTHROMYCIN 200 MG: 200 SUSPENSION ORAL at 08:56

## 2024-03-29 RX ADMIN — ERYTHROMYCIN 200 MG: 200 SUSPENSION ORAL at 18:54

## 2024-03-29 RX ADMIN — SODIUM CHLORIDE, PRESERVATIVE FREE 10 ML: 5 INJECTION INTRAVENOUS at 08:57

## 2024-03-29 RX ADMIN — OXYCODONE AND ACETAMINOPHEN 1 TABLET: 10; 325 TABLET ORAL at 19:11

## 2024-03-29 RX ADMIN — ATORVASTATIN CALCIUM 80 MG: 20 TABLET, FILM COATED ORAL at 21:02

## 2024-03-29 RX ADMIN — EPOETIN ALFA-EPBX 15000 UNITS: 10000 INJECTION, SOLUTION INTRAVENOUS; SUBCUTANEOUS at 16:44

## 2024-03-29 RX ADMIN — PANTOPRAZOLE SODIUM 40 MG: 40 TABLET, DELAYED RELEASE ORAL at 06:08

## 2024-03-29 RX ADMIN — DIPHENHYDRAMINE HYDROCHLORIDE 25 MG: 25 CAPSULE ORAL at 14:23

## 2024-03-29 RX ADMIN — SENNOSIDES 8.6 MG: 8.6 TABLET, FILM COATED ORAL at 21:03

## 2024-03-29 RX ADMIN — ALLOPURINOL 100 MG: 100 TABLET ORAL at 08:52

## 2024-03-29 RX ADMIN — POLYETHYLENE GLYCOL 3350 17 G: 17 POWDER, FOR SOLUTION ORAL at 08:57

## 2024-03-29 RX ADMIN — SODIUM CHLORIDE, SODIUM LACTATE, CALCIUM CHLORIDE, MAGNESIUM CHLORIDE AND DEXTROSE 6000 ML: 1.5; 538; 448; 18.3; 5.08 INJECTION, SOLUTION INTRAPERITONEAL at 15:25

## 2024-03-29 RX ADMIN — ALBUMIN (HUMAN) 12.5 G: 12.5 INJECTION, SOLUTION INTRAVENOUS at 14:20

## 2024-03-29 RX ADMIN — POTASSIUM BICARBONATE 20 MEQ: 782 TABLET, EFFERVESCENT ORAL at 08:52

## 2024-03-29 RX ADMIN — MIDODRINE HYDROCHLORIDE 15 MG: 5 TABLET ORAL at 21:01

## 2024-03-29 RX ADMIN — OXYCODONE AND ACETAMINOPHEN 1 TABLET: 10; 325 TABLET ORAL at 10:42

## 2024-03-29 RX ADMIN — ERYTHROMYCIN 200 MG: 200 SUSPENSION ORAL at 21:45

## 2024-03-29 RX ADMIN — HYDROCORTISONE SODIUM SUCCINATE 50 MG: 100 INJECTION, POWDER, FOR SOLUTION INTRAMUSCULAR; INTRAVENOUS at 13:32

## 2024-03-29 RX ADMIN — SODIUM CHLORIDE 4 MCG/MIN: 9 INJECTION, SOLUTION INTRAVENOUS at 00:58

## 2024-03-29 RX ADMIN — CEFEPIME 1000 MG: 1 INJECTION, POWDER, FOR SOLUTION INTRAMUSCULAR; INTRAVENOUS at 09:02

## 2024-03-29 RX ADMIN — MAGNESIUM SULFATE HEPTAHYDRATE 2000 MG: 40 INJECTION, SOLUTION INTRAVENOUS at 07:34

## 2024-03-29 ASSESSMENT — PAIN SCALES - GENERAL
PAINLEVEL_OUTOF10: 3
PAINLEVEL_OUTOF10: 9
PAINLEVEL_OUTOF10: 9
PAINLEVEL_OUTOF10: 0
PAINLEVEL_OUTOF10: 0

## 2024-03-29 ASSESSMENT — PAIN DESCRIPTION - LOCATION: LOCATION: ABDOMEN

## 2024-03-29 NOTE — FLOWSHEET NOTE
03/29/24 1515   Observations & Evaluations   Level of Consciousness 0   Oriented X 4   Heart Rhythm Regular   Respiratory Quality/Effort Unlabored   O2 Device Nasal cannula   Skin Condition/Temp Dry;Warm   Edema Generalized   Peritoneal Dialysis Catheter Mid lower abdomen   No placement date or time found.   Present on Admission/Arrival: Yes  Catheter Location: Mid lower abdomen   Status Accessed   Site Condition Clean, dry, intact   Dressing Status New dressing applied   Dressing Gauze   Date of Last Dressing Change 03/29/24   Dialysis Type Continuous cycling   Exit Site Condition good   Catheter Care Given Yes   Cycler   Verification of Prescription CCPD   Informed Consent  Yes   Total Volume Programmed 31694 mL   Therapy Time (Hours:Minutes) 60547   Cycler Type Yen [facility]   Fill Volume 3000 mL   Last Fill Volume 0 mL   Dextrose Setting Same (Nonextraneal)   I Drain Alarm 0 mL   Number of Cycles 5   Bag Volume 6000 mL   Number of Bags Used 3   Dianeal Solution   (1st bag 2.5%, 2,3rd bags 1.5% Dianeal in 6000 ml)     Time Out (time): 1510  Primary RN SBAR: Paddy Dueñas, RN    Patient Education: access care, procedure    As per conversation with Dr Combs Bag 1 changed to Dianeal 2.5%    Hepatitis B Surface Ag   Date/Time Value Ref Range Status   03/28/2024 05:23 AM <0.10 Index Final     Hep B S Ab   Date/Time Value Ref Range Status   03/28/2024 05:23 AM <3.10 mIU/mL Final

## 2024-03-29 NOTE — PROGRESS NOTES
[facility] Dosing Services: 03/29/24    Consult for Warfarin Dosing by Pharmacy by Dr. Marti  Consult provided for this 68 y.o. female , for indication of Hx of VTE  Day of Therapy: Resumed from PTA medication list  Home Regimen: Warfarin 2.5 mg on Wednesdays and Fridays, and warfarin 1 mg on the other 5 days of the week  Dose to achieve an INR goal of 2-3    Assessment/Plan:  H/H and platelets are stable - noted chronic anemia, no documentation of any current bleeding  Today's INR of 3.2 is above therapeutic range  Will hold warfarin dose tonight due to high INR, with plan to likely resume warfarin tomorrow  INR ordered daily with AM labs    Significant drug interactions, such as enoxaparin: Cefepime, allopurinol, erythromycin, steroids  PT/INR Lab Results   Component Value Date/Time    INR 3.2 03/29/2024 10:40 AM    INR 1.8 05/03/2023 02:34 PM    INREXT 4.1 05/20/2021 12:00 AM      Platelets Lab Results   Component Value Date/Time     03/29/2024 04:00 AM      H/H Lab Results   Component Value Date/Time    HGB 7.3 03/29/2024 04:00 AM        Pharmacy to follow daily and will provide subsequent Warfarin dosing based on clinical status.  Dario Mendieta [facility]) Contact information [phone]

## 2024-03-29 NOTE — PROGRESS NOTES
Affiliated with [facility] and [facility]       Resident Progress Note in Brief    S: Patient seen and examined at bedside.  Patient is subjective condition largely unchanged.  Patient continues to endorse sharp pain in the left upper and lower abdomen worsened by palpation.  Otherwise denies shortness of breath, nausea/vomiting, or other concerns.  Patient does continue to have bilateral lower extremity pain right greater than left.      O:  BP 93/61   Pulse 71   Temp 97.8 °F (36.6 °C) (Oral)   Resp 18   Ht 1.778 m (5' 10\")   Wt 127.9 kg (282 lb)   SpO2 99%   BMI 40.46 kg/m²   Physical Examination:   General appearance - alert, well appearing, and in no distress  Chest - clear to auscultation, no wheezes, rales or rhonchi, symmetric air entry  Heart - normal rate, regular rhythm, normal S1, S2, no murmurs, rubs, clicks or gallops,   Abdomen -TTP in the left upper quadrant, no masses noted  Neurological - alert, oriented, normal speech, no focal findings  Extremities - peripheral pulses normal, patient continues to have 2+ pitting edema up to the knees bilaterally with tenderness to palpation in the bilateral lateral lower extremities.    A/P:     Elizabeth Hopkins is a 68 y.o. female with pmhx of ESRD, CAD, HTN, VTE, DM, ILD, pancreatitis who is admitted for hypotension and sepsis.     Concern for septic shock: Improving. Potential sources include SBP vs PD site cellulitis. On admission meeting 3/4 SIRS criteria HR, RR, WBC 16.8. LA 2.9). CT mod ascites, right lateral abd wall cellulitis. Unable to give 30cc/kg,but LA did not respond to albumin x5 and 500cc NS bolus. Procal 0.6, LA trending up, possible 2/2 cirrhosis. S/p ceftriaxone 3/26. RPP negative. 1/4 Blood culture positive for Staph Epi, likely contaminant. Procal 0.75  - IV vanc/cefepime/flagyl/fluconazole (3/26-) for empiric coverage.   - IV levo gtt for MAP >60, patient requiring 2 mcg at this time. Wean as tolerated  - follow blood cultures  -

## 2024-03-29 NOTE — PROGRESS NOTES
attended rounds in ICU as part of interdisciplinary team where patient's ongoing care was discussed. Please contact [facility] for further referrals.    Rev Luciana Grullon MDiv, BCC  To maame parsons: [phone])

## 2024-03-29 NOTE — PROGRESS NOTES
Affiliated with [facility]  [address] [id number]   Office [phone], Fax [phone]       Subjective / Objective     Subjective:  Overnight events: NAEON. Remained on CPAP.    Patient seen and examined at bedside.  She states that overall she is unchanged from yesterday.  Is continuing to have SOB and abdominal pain which remain unchanged from previous.  Denies any N/V/D.    Vital Signs  Vitals:    03/29/24 1113   BP: (!) 119/93   Pulse:    Resp:    Temp:    SpO2:          Physical Exam  General: No acute distress. Alert. Cooperative.   Head: Normocephalic. Atraumatic.    ENT:             Moist mucous membranes. Sclera white. PERRL.   Respiratory: CTAB. No w/r/r. No accessory muscle use.   Cardiovascular: RRR. Normal S1,S2. No m/r/g.    GI: Abdomen soft, diffusely tender on palpation but most severe in the epigastric area and LLQ with guarding. No rebound tenderness. Nondistended.  PD catheter in place. No evidence of rash or infection on skin.    Extremities: Bilateral LE 2+ pitting edema, R > L.  Right LE has overlying chronic skin changes.    Skin:  Neuro: Warm. No rashes.  A&Ox4. No functional neurologic deficit noted.        I/O:    Intake/Output Summary (Last 24 hours) at 3/29/2024 1157  Last data filed at 3/29/2024 1000  Gross per 24 hour   Intake 800.36 ml   Output --   Net 800.36 ml        CBC:  Recent Labs     03/27/24  0429 03/28/24  0523 03/29/24  0400   WBC 15.8* 13.4* 12.5*   HGB 7.4* 7.8* 7.3*   HCT 23.6* 24.3* 22.4*    193 198       Metabolic Panel:  Recent Labs     03/27/24  0429 03/27/24  0432 03/28/24  0523 03/29/24  0400 03/29/24  1040   NA  --  137 133* 133*  --    K  --  3.8 4.2 4.3  --    CL  --  104 100 100  --    CO2  --  23 25 26  --    BUN  --  13 17 18  --    MG 1.2*  --  1.4* 1.6  --    ALT  --  10* 10* 9*  --    INR 3.5*  --  3.1* 3.3* 3.2*         Imaging  CTA ABDOMEN PELVIS  follow paracentesis culture.      Hypotension: acute on chronic. Random cortisol of <18 (15.8 on 3/29) concerning for possible adrenal crisis given continuing hypotension .  Has additionally been hyponatremia with abdominal pain and diffuse myalgias concerning for adrenal insufficiency.  Home midodrine 10 mg TID.  - levophed gtt as above  - increase midodrine to 15 mg TID  - continue Solu-Cortef 50 mg q 6     ESRD on PD follows with Dr. Parker.  - nephro consulted, appreciate recs  - continue PD qhs  - continue folic acid, Klor con 20meq, Mag 30mg     Cirrhosis recently diagnosed. Has an upcoming appointment to establish with hepatology. Normal ALT, AST. Elevated . Albumin 1.8. GGT 63.  - alpha antitrypsin pending     ILD/ VERÓNICA follows with Dr. Chandler at [facility]. 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 On home 3L oxygen at all times.  - continue home CPAP  - Anti-trypsin level pending     CAD, HFpEF, atypical angina s/p PCI x4  - cont Lipitor 80mg qd  - Hold home metoprolol   - nitroglycerin prn     Chronic back pain  - continue home Percocet 10-325mg TID prn     H/o VTE   - continue home Warfarin; pharmacy to dose     Gastroparesis did not tolerate Reglan.  - continue home erythromycin TID AC     GERD Stable  -Continue home protonix 40mg daily     Obesity: PT with BMI of Body mass index is 40.46 kg/m².  - Encouraging lifestyle modifications and further follow up outpatient.     FEN/GI - Regular diet.   Activity - Ambulate as tolerated  DVT prophylaxis - home warfarin  GI prophylaxis - Protonix  Fall prophylaxis - Not indicated at this time.  Disposition - Admitted in ICU. Plan to d/c to TBD.  Code Status - FULL     Patient discussed with Dario Davis MD Jacqueline Bellaci  Medical Student, [facility] 2024    In my role as a medical student, all portions of the history and physical exam were supervised or repeated, and all documentation has been reviewed by the resident

## 2024-03-29 NOTE — PLAN OF CARE
Problem: Discharge Planning  Goal: Discharge to home or other facility with appropriate resources  Recent Flowsheet Documentation  Taken 3/28/2024 2000 by Steve Lau RN  Discharge to home or other facility with appropriate resources:   Identify barriers to discharge with patient and caregiver   Arrange for needed discharge resources and transportation as appropriate   Identify discharge learning needs (meds, wound care, etc)     Problem: Respiratory - Adult  Goal: Achieves optimal ventilation and oxygenation  Recent Flowsheet Documentation  Taken 3/28/2024 2309 by Bella Ruiz RCP  Achieves optimal ventilation and oxygenation:   Respiratory therapy support as indicated   Assess and instruct to report shortness of breath or any respiratory difficulty   Oxygen supplementation based on oxygen saturation or arterial blood gases   Assess for changes in respiratory status   Assess for changes in mentation and behavior   Encourage broncho-pulmonary hygiene including cough, deep breathe, incentive spirometry  Taken 3/28/2024 2000 by Steve Lau RN  Achieves optimal ventilation and oxygenation:   Assess for changes in respiratory status   Assess for changes in mentation and behavior   Position to facilitate oxygenation and minimize respiratory effort   Oxygen supplementation based on oxygen saturation or arterial blood gases     Problem: Chronic Conditions and Co-morbidities  Goal: Patient's chronic conditions and co-morbidity symptoms are monitored and maintained or improved  Recent Flowsheet Documentation  Taken 3/28/2024 2000 by Steve Lau RN  Care Plan - Patient's Chronic Conditions and Co-Morbidity Symptoms are Monitored and Maintained or Improved:   Monitor and assess patient's chronic conditions and comorbid symptoms for stability, deterioration, or improvement   Collaborate with multidisciplinary team to address chronic and comorbid conditions and prevent exacerbation or deterioration   Update acute care plan

## 2024-03-29 NOTE — PLAN OF CARE
Problem: Occupational Therapy - Adult  Goal: By Discharge: Performs self-care activities at highest level of function for planned discharge setting.  See evaluation for individualized goals.  Description: FUNCTIONAL STATUS PRIOR TO ADMISSION: Patient reports a history of chronic back pain due to DDD and b/l shoulder pain due severe OA.  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  She had a recent 3 week hospital stay and returned home for ~1 week before current admission.  At home, she was ambulating some using a rollator in PT but otherwise was using a wheelchair or motorized scooter to mobilize with assistance for transfers.  Her wheelchair fits in her bathroom and she required assistance with tub transfer bench and toilet transfers (with [facility] [place] toilet frame). She did not require ADL assist outside of transfers.    HOME SUPPORT: Patient resided at home with her  and son to provide assistance.     Occupational Therapy Goals:  Initiated 3/28/2024  1.  Patient will perform bathing with Supervision within 7 day(s).  2.  Patient will perform lower body dressing using AE PRN with Supervision within 7 day(s).  4.  Patient will perform toilet transfers to BSC with Supervision  within 7 day(s).  5.  Patient will perform all aspects of toileting with Supervision within 7 day(s).  6.  Patient will participate in upper extremity therapeutic exercise/activities with Supervision for 10 minutes within 7 day(s).    7.  Patient will utilize energy conservation techniques during functional activities with verbal cues within 7 day(s).  7.  Patient will utilize fall prevention techniques during functional activities with verbal cues within 7 day(s).      Outcome: Progressing   OCCUPATIONAL THERAPY TREATMENT  Patient: Elizabeth Hopkins (68 y.o. female)  Date: 3/29/2024  Primary Diagnosis:  Hypotension [I95.9]  Hypotension, unspecified hypotension type [I95.9]  Chronic kidney disease, unspecified CKD stage [N18.9]       Precautions:                  Chart, occupational therapy assessment, plan of care, and goals were reviewed.    ASSESSMENT  Patient continues to benefit from skilled OT services and is slowly progressing towards goals. Patient agreeable to therapy with encouragement when coordinating meds and time for therapy in advance. She is limited by insight and poor safety awareness, as well as generalized pain/weakness. Pt very particular and prefers to direct therapy, minimally receptive to suggestions/education but agreeable for chair transfer though declines RW and attempts transfer without AD ultimately requiring HHA + Gay. BP initially quite soft but elevates appropriately with furthered activity. She is not agreeable to ADL activity/engagement at this time - will follow 3x/ week while inpatient to maintain current strength/independence in prep for return home with family assistance and HHOT resumption.             PLAN :  Patient continues to benefit from skilled intervention to address the above impairments.  Continue treatment per established plan of care to address goals.    Recommend with staff: OOB to recliner as tolerated. OOB to BSC with assist x1 and RW for safety     Recommend next OT session: BSC transfer/ADL engagement    Recommendation for discharge: (in order for the patient to meet his/her long term goals): Therapy 2 days/week in the home and also see \"other factors to consider\" below for additional discharge concerns/needs    Other factors to consider for discharge: poor safety awareness, high risk for falls, not safe to be alone, concern for safely navigating or managing the home environment, and new weight bearing restrictions limiting activity or patient is unable to maintain    IF patient discharges home will need the following DME: patient owns DME required for

## 2024-03-29 NOTE — PROGRESS NOTES
[facility] Daily Progress Note.      Name: Elizabeth Hopkins   : 1955   MRN: [id number]   Date: 3/29/2024        Chief Complaint   Patient presents with    Hypotension         HPI:     68y old F with a PMHx of ESRD on PD, DM, CHF has been admitted recently for chest wall pain, abdominal pain and chronic epigastric pain. Has been diagnosed with cirrhosis as well as other comorbidities and is scheduled to see hepatology. She does have a history of low blood pressure and is on midodrine 3 times per day WHICH WAS STARTED ON 3/28. Found to be hypotensive despite crystalloid and colloids in ER and started on levophed.    Active Problems Being Managed:     Septic Shock  Abdominal wall cellulitis  ILD  ESRD on PD  Chronic Pain  HFpEF  Supratherapeutic INR  Lactic acidosis  Coag negative + in 1/ cultures (likely contaminant)  Adrenal insufficiency; cortisol level < 18 on 3/29      Assessment/Plan:     - continue percocet; added benadryl for pruritus  - reviewed echo results (see below) and has normal EF of 60-65%  - Based on previous notes pt has low BP's and was recently started on midodrine; clinically appears she may have developed sepsis requiring levophed.  - also on hydrocortisone and florinef added today by Nephrology  - lactate was 3.8 to 3.5 and now 3.7; will give additional volume but may also be elevated due to her underlyign liver disease.  - 3/26 blood cx growing staph epidermis; ? Contaminant  - repeat blood cultures  - continue cefepime  - has h/o ILD/VERÓNICA; continue bipap  - holding warfarin for elevated INR of 3.3    3/27/24 2D echo:      Left Ventricle: Normal left ventricular systolic function with a visually estimated EF of 60 - 65%. Not well visualized. Left ventricle size is normal. Increased wall thickness. Unable to assess wall motion.    Right Ventricle: Not well visualized.    Mitral Valve: Mild regurgitation.    Image quality is technically difficult. Contrast used:  packs/day for 44.0 years (22.0 ttl pk-yrs)     Types: Cigarettes     Start date: 1970     Quit date: 2014     Years since quittin.3    Smokeless tobacco: Never   Substance Use Topics    Alcohol use: No      Family History   Problem Relation Age of Onset    Anesth Problems Neg Hx     Heart Disease Mother     No Known Problems Daughter     No Known Problems Son     No Known Problems Son          LABS AND  DATA:   Reviewed      Peak airway pressure:      Minute ventilation:        CRITICAL CARE CONSULTANT NOTE  I had a face to face encounter with the patient, reviewed and interpreted patient data including clinical events, labs, images, vital signs, I/O's, and examined patient.  I have discussed the case and the plan and management of the patient's care with the consulting services, the bedside nurses and the respiratory therapist.      NOTE OF PERSONAL INVOLVEMENT IN CARE   This patient has a high probability of imminent, clinically significant deterioration, which requires the highest level of preparedness to intervene urgently. I participated in the decision-making and personally managed or directed the management of the following life and organ supporting interventions that required my frequent assessment to treat or prevent imminent deterioration.        Arslan Moya MD   Critical Care Medicine  [facility]  [phone]  3/29/2024

## 2024-03-29 NOTE — PLAN OF CARE
Problem: Physical Therapy - Adult  Goal: By Discharge: Performs mobility at highest level of function for planned discharge setting.  See evaluation for individualized goals.  Description: FUNCTIONAL STATUS PRIOR TO ADMISSION: Reports limited mobility following a right TKA 8/2023 exacerbated following a recent 3 week hospital stay. She returned home approximately 1 week ago and has been primarily using her wheelchair. She reports transferring with assist x2 from her  and son. Recently walking short distances using a RW with HHPT.    HOME SUPPORT PRIOR TO ADMISSION: The patient lived with her  and adult son who assisted with transfers to her wheelchair and tub.    Physical Therapy Goals  Initiated 3/28/2024  1.  Patient will move from supine to sit and sit to supine in bed with modified independence within 7 day(s).    2.  Patient will perform sit to stand with modified independence within 7 day(s).  3.  Patient will transfer from bed to chair and chair to bed with supervision/set-up using the least restrictive device within 7 day(s).  4.  Patient will ambulate with supervision/set-up for 15 feet with the least restrictive device within 7 day(s).       Outcome: Progressing   PHYSICAL THERAPY TREATMENT    Patient: Elizabeth Hopkins (68 y.o. female)  Date: 3/29/2024  Diagnosis: Hypotension [I95.9]  Hypotension, unspecified hypotension type [I95.9]  Chronic kidney disease, unspecified CKD stage [N18.9] Hypotension      Precautions:                        ASSESSMENT:  Patient continues to benefit from skilled PT services and is slowly progressing towards goals. Patient agreeable to activity after pre-medication and ~60 min notice before session. She strongly prefers to direct what is completed during session and how it is done. She required SBA-CGA overall for bed mobility and completed transfers. Encouraged use of a RW for a stand pivot to bedside chair but she deferred. Instead, she completed a stand

## 2024-03-29 NOTE — PROGRESS NOTES
PRN    cefepime (MAXIPIME) 1,000 mg in sodium chloride 0.9 % 50 mL IVPB (mini-bag)  1,000 mg IntraVENous Q24H    norepinephrine (LEVOPHED) 16 mg in sodium chloride 0.9 % 250 mL infusion  0.5-20 mcg/min IntraVENous Continuous    fentaNYL (SUBLIMAZE) injection 50 mcg  50 mcg IntraVENous Once    midazolam (VERSED) injection 2 mg  2 mg IntraVENous Once    gentamicin (GARAMYCIN) 0.1 % cream   Topical Daily before dinner    sodium chloride flush 0.9 % injection 5-40 mL  5-40 mL IntraVENous 2 times per day    sodium chloride flush 0.9 % injection 5-40 mL  5-40 mL IntraVENous PRN    0.9 % sodium chloride infusion   IntraVENous PRN    ondansetron (ZOFRAN-ODT) disintegrating tablet 4 mg  4 mg Oral Q8H PRN    Or    ondansetron (ZOFRAN) injection 4 mg  4 mg IntraVENous Q6H PRN    acetaminophen (TYLENOL) tablet 650 mg  650 mg Oral Q6H PRN    Or    acetaminophen (TYLENOL) suppository 650 mg  650 mg Rectal Q6H PRN    pantoprazole (PROTONIX) tablet 40 mg  40 mg Oral QAM AC          Objective:     Vitals:  Blood pressure (!) 105/39, pulse 60, temperature 98.3 °F (36.8 °C), temperature source Axillary, resp. rate 13, height 1.778 m (5' 10\"), weight 127.9 kg (282 lb), SpO2 100 %.  Temp (24hrs), Av.1 °F (36.7 °C), Min:97.7 °F (36.5 °C), Max:98.4 °F (36.9 °C)      Intake and Output:  701 - 1900  In: 38.9 [I.V.:38.9]  Out: -   1901 -  07  In: 1066.9 [I.V.:370]  Out:      Physical Exam:               GENERAL ASSESSMENT: NAD  HEENT: Nontraumatic   CHEST: unlabored  HEART: reg  ABDOMEN: Soft,NT  EXTREMITY: trace EDEMA  NEURO: Grossly non focal          ECG/rhythm:    Data Review        Recent Labs     24  0432 24  0523 24  0400    133* 133*   K 3.8 4.2 4.3    100 100   CO2 23 25 26   BUN 13 17 18   CREATININE 5.58* 5.90* 5.78*   GLUCOSE 162* 215* 202*   CALCIUM 8.3* 8.6 8.3*         : Dion Combs MD  3/29/2024        [facility]

## 2024-03-29 NOTE — FLOWSHEET NOTE
Peritoneal Dialysis Disconnection / [phone]    Primary RN SBAR: Hua, RN  Patient Education: access/site care    Hepatitis B Surface Ag   Date/Time Value Ref Range Status   03/28/2024 05:23 AM <0.10 Index Final     Hep B S Ab   Date/Time Value Ref Range Status   03/28/2024 05:23 AM <3.10 mIU/mL Final       03/29/24 0205   Peritoneal Dialysis Catheter Mid lower abdomen   No placement date or time found.   Present on Admission/Arrival: Yes  Catheter Location: Mid lower abdomen   Status Deaccessed   Site Condition Clean, dry, intact   Dressing Status Clean, dry & intact   Dressing Gauze   Date of Last Dressing Change 03/28/24   Dialysis Type Continuous cycling   Exit Site Condition good   Catheter Care Given Yes   Post-Treatment (Cycler)   Average Dwell Time (Hours:Minutes) 1:11   Lost Dwell Time (Hours:Minutes) 0:04   Effluent Appearance Yellow;Clear   Volume Gain (mL) 315 mL  ( ml + TUF (-428 ml) =315)     Ely RN at bedside to disconnect pt from CCPD tx. Orders, consent, pt, and code status confirmed. Tx completed. Used cassette and bags discarded. Education and pre/post report given to primary RN.    Upon arrival, 'low UF' alarms on cycler.  Pt repositioned several times and drain resumed.      Net fluid gain: 315 ml

## 2024-03-30 LAB
A1AT SERPL-MCNC: 154 MG/DL (ref 101–187)
ALBUMIN SERPL-MCNC: 2 G/DL (ref 3.5–5)
ALBUMIN/GLOB SERPL: 0.6 (ref 1.1–2.2)
ALP SERPL-CCNC: 143 U/L (ref 45–117)
ALT SERPL-CCNC: 12 U/L (ref 12–78)
ANION GAP SERPL CALC-SCNC: 9 MMOL/L (ref 5–15)
AST SERPL-CCNC: 11 U/L (ref 15–37)
BASOPHILS # BLD: 0 K/UL (ref 0–0.1)
BASOPHILS NFR BLD: 0 % (ref 0–1)
BILIRUB SERPL-MCNC: 0.5 MG/DL (ref 0.2–1)
BUN SERPL-MCNC: 19 MG/DL (ref 6–20)
BUN/CREAT SERPL: 3 (ref 12–20)
CALCIUM SERPL-MCNC: 9.1 MG/DL (ref 8.5–10.1)
CHLORIDE SERPL-SCNC: 99 MMOL/L (ref 97–108)
CO2 SERPL-SCNC: 26 MMOL/L (ref 21–32)
CREAT SERPL-MCNC: 5.61 MG/DL (ref 0.55–1.02)
DIFFERENTIAL METHOD BLD: ABNORMAL
EOSINOPHIL # BLD: 0 K/UL (ref 0–0.4)
EOSINOPHIL NFR BLD: 0 % (ref 0–7)
ERYTHROCYTE [DISTWIDTH] IN BLOOD BY AUTOMATED COUNT: 19.1 % (ref 11.5–14.5)
GLOBULIN SER CALC-MCNC: 3.1 G/DL (ref 2–4)
GLUCOSE SERPL-MCNC: 214 MG/DL (ref 65–100)
HCT VFR BLD AUTO: 21.8 % (ref 35–47)
HGB BLD-MCNC: 7 G/DL (ref 11.5–16)
IMM GRANULOCYTES # BLD AUTO: 0.2 K/UL (ref 0–0.04)
IMM GRANULOCYTES NFR BLD AUTO: 1 % (ref 0–0.5)
INR PPP: 2.8 (ref 0.9–1.1)
LACTATE SERPL-SCNC: 2.5 MMOL/L (ref 0.4–2)
LACTATE SERPL-SCNC: 2.6 MMOL/L (ref 0.4–2)
LACTATE SERPL-SCNC: 2.7 MMOL/L (ref 0.4–2)
LACTATE SERPL-SCNC: 2.9 MMOL/L (ref 0.4–2)
LACTATE SERPL-SCNC: 3.2 MMOL/L (ref 0.4–2)
LYMPHOCYTES # BLD: 1 K/UL (ref 0.8–3.5)
LYMPHOCYTES NFR BLD: 7 % (ref 12–49)
MAGNESIUM SERPL-MCNC: 2 MG/DL (ref 1.6–2.4)
MCH RBC QN AUTO: 33.7 PG (ref 26–34)
MCHC RBC AUTO-ENTMCNC: 32.1 G/DL (ref 30–36.5)
MCV RBC AUTO: 104.8 FL (ref 80–99)
MONOCYTES # BLD: 0.6 K/UL (ref 0–1)
MONOCYTES NFR BLD: 4 % (ref 5–13)
NEUTS SEG # BLD: 13.2 K/UL (ref 1.8–8)
NEUTS SEG NFR BLD: 88 % (ref 32–75)
NRBC # BLD: 0.15 K/UL (ref 0–0.01)
NRBC BLD-RTO: 1 PER 100 WBC
PLATELET # BLD AUTO: 236 K/UL (ref 150–400)
PMV BLD AUTO: 10.5 FL (ref 8.9–12.9)
POTASSIUM SERPL-SCNC: 3.8 MMOL/L (ref 3.5–5.1)
PROT SERPL-MCNC: 5.1 G/DL (ref 6.4–8.2)
PROTHROMBIN TIME: 27.2 SEC (ref 9–11.1)
RBC # BLD AUTO: 2.08 M/UL (ref 3.8–5.2)
SODIUM SERPL-SCNC: 134 MMOL/L (ref 136–145)
WBC # BLD AUTO: 15 K/UL (ref 3.6–11)

## 2024-03-30 PROCEDURE — 6360000002 HC RX W HCPCS: Performed by: INTERNAL MEDICINE

## 2024-03-30 PROCEDURE — 90945 DIALYSIS ONE EVALUATION: CPT

## 2024-03-30 PROCEDURE — A4722 DIALYS SOL FLD VOL > 1999CC: HCPCS | Performed by: INTERNAL MEDICINE

## 2024-03-30 PROCEDURE — 99232 SBSQ HOSP IP/OBS MODERATE 35: CPT | Performed by: FAMILY MEDICINE

## 2024-03-30 PROCEDURE — 83605 ASSAY OF LACTIC ACID: CPT

## 2024-03-30 PROCEDURE — 6370000000 HC RX 637 (ALT 250 FOR IP): Performed by: ANESTHESIOLOGY

## 2024-03-30 PROCEDURE — 6370000000 HC RX 637 (ALT 250 FOR IP)

## 2024-03-30 PROCEDURE — 83735 ASSAY OF MAGNESIUM: CPT

## 2024-03-30 PROCEDURE — 85025 COMPLETE CBC W/AUTO DIFF WBC: CPT

## 2024-03-30 PROCEDURE — 80053 COMPREHEN METABOLIC PANEL: CPT

## 2024-03-30 PROCEDURE — 6370000000 HC RX 637 (ALT 250 FOR IP): Performed by: INTERNAL MEDICINE

## 2024-03-30 PROCEDURE — 2580000003 HC RX 258

## 2024-03-30 PROCEDURE — 2580000003 HC RX 258: Performed by: INTERNAL MEDICINE

## 2024-03-30 PROCEDURE — 36415 COLL VENOUS BLD VENIPUNCTURE: CPT

## 2024-03-30 PROCEDURE — 2580000003 HC RX 258: Performed by: ANESTHESIOLOGY

## 2024-03-30 PROCEDURE — 6370000000 HC RX 637 (ALT 250 FOR IP): Performed by: FAMILY MEDICINE

## 2024-03-30 PROCEDURE — 2000000000 HC ICU R&B

## 2024-03-30 PROCEDURE — 6370000000 HC RX 637 (ALT 250 FOR IP): Performed by: HOSPITALIST

## 2024-03-30 PROCEDURE — 6360000002 HC RX W HCPCS: Performed by: HOSPITALIST

## 2024-03-30 PROCEDURE — 2580000003 HC RX 258: Performed by: HOSPITALIST

## 2024-03-30 PROCEDURE — 85610 PROTHROMBIN TIME: CPT

## 2024-03-30 RX ORDER — WARFARIN SODIUM 1 MG/1
1 TABLET ORAL
Status: COMPLETED | OUTPATIENT
Start: 2024-03-30 | End: 2024-03-30

## 2024-03-30 RX ADMIN — SODIUM CHLORIDE, SODIUM LACTATE, CALCIUM CHLORIDE, MAGNESIUM CHLORIDE AND DEXTROSE 6000 ML: 2.5; 538; 448; 18.3; 5.08 INJECTION, SOLUTION INTRAPERITONEAL at 19:32

## 2024-03-30 RX ADMIN — ERYTHROMYCIN 200 MG: 200 SUSPENSION ORAL at 13:07

## 2024-03-30 RX ADMIN — GENTAMICIN SULFATE: 1 CREAM TOPICAL at 19:33

## 2024-03-30 RX ADMIN — ERYTHROMYCIN 200 MG: 200 SUSPENSION ORAL at 08:00

## 2024-03-30 RX ADMIN — OXYCODONE AND ACETAMINOPHEN 1 TABLET: 10; 325 TABLET ORAL at 18:27

## 2024-03-30 RX ADMIN — Medication 400 MG: at 08:00

## 2024-03-30 RX ADMIN — PANTOPRAZOLE SODIUM 40 MG: 40 TABLET, DELAYED RELEASE ORAL at 05:56

## 2024-03-30 RX ADMIN — SODIUM CHLORIDE, SODIUM LACTATE, CALCIUM CHLORIDE, MAGNESIUM CHLORIDE AND DEXTROSE 6000 ML: 1.5; 538; 448; 18.3; 5.08 INJECTION, SOLUTION INTRAPERITONEAL at 19:31

## 2024-03-30 RX ADMIN — CEFEPIME 1000 MG: 1 INJECTION, POWDER, FOR SOLUTION INTRAMUSCULAR; INTRAVENOUS at 07:50

## 2024-03-30 RX ADMIN — SODIUM CHLORIDE, PRESERVATIVE FREE 10 ML: 5 INJECTION INTRAVENOUS at 20:11

## 2024-03-30 RX ADMIN — FLUDROCORTISONE ACETATE 0.1 MG: 0.1 TABLET ORAL at 20:10

## 2024-03-30 RX ADMIN — POLYETHYLENE GLYCOL 3350 17 G: 17 POWDER, FOR SOLUTION ORAL at 07:48

## 2024-03-30 RX ADMIN — MIDODRINE HYDROCHLORIDE 15 MG: 5 TABLET ORAL at 20:09

## 2024-03-30 RX ADMIN — DIPHENHYDRAMINE HYDROCHLORIDE 25 MG: 25 CAPSULE ORAL at 11:59

## 2024-03-30 RX ADMIN — OXYCODONE AND ACETAMINOPHEN 1 TABLET: 10; 325 TABLET ORAL at 11:59

## 2024-03-30 RX ADMIN — PANTOPRAZOLE SODIUM 40 MG: 40 TABLET, DELAYED RELEASE ORAL at 16:04

## 2024-03-30 RX ADMIN — DOCUSATE SODIUM 100 MG: 100 CAPSULE, LIQUID FILLED ORAL at 08:00

## 2024-03-30 RX ADMIN — Medication 400 MG: at 20:10

## 2024-03-30 RX ADMIN — MIDODRINE HYDROCHLORIDE 15 MG: 5 TABLET ORAL at 08:00

## 2024-03-30 RX ADMIN — FOLIC ACID 1000 MCG: 1 TABLET ORAL at 08:00

## 2024-03-30 RX ADMIN — DIPHENHYDRAMINE HYDROCHLORIDE 25 MG: 25 CAPSULE ORAL at 20:10

## 2024-03-30 RX ADMIN — OXYCODONE AND ACETAMINOPHEN 1 TABLET: 10; 325 TABLET ORAL at 03:30

## 2024-03-30 RX ADMIN — HYDROCORTISONE SODIUM SUCCINATE 50 MG: 100 INJECTION, POWDER, FOR SOLUTION INTRAMUSCULAR; INTRAVENOUS at 05:56

## 2024-03-30 RX ADMIN — ERYTHROMYCIN 200 MG: 200 SUSPENSION ORAL at 18:29

## 2024-03-30 RX ADMIN — ERYTHROMYCIN 200 MG: 200 SUSPENSION ORAL at 20:11

## 2024-03-30 RX ADMIN — MIDODRINE HYDROCHLORIDE 15 MG: 5 TABLET ORAL at 13:08

## 2024-03-30 RX ADMIN — WARFARIN SODIUM 1 MG: 1 TABLET ORAL at 18:28

## 2024-03-30 RX ADMIN — HYDROCORTISONE SODIUM SUCCINATE 50 MG: 100 INJECTION, POWDER, FOR SOLUTION INTRAMUSCULAR; INTRAVENOUS at 13:07

## 2024-03-30 RX ADMIN — SODIUM CHLORIDE, POTASSIUM CHLORIDE, SODIUM LACTATE AND CALCIUM CHLORIDE: 600; 310; 30; 20 INJECTION, SOLUTION INTRAVENOUS at 02:20

## 2024-03-30 RX ADMIN — SENNOSIDES 8.6 MG: 8.6 TABLET, FILM COATED ORAL at 20:10

## 2024-03-30 RX ADMIN — HYDROCORTISONE SODIUM SUCCINATE 50 MG: 100 INJECTION, POWDER, FOR SOLUTION INTRAMUSCULAR; INTRAVENOUS at 20:10

## 2024-03-30 RX ADMIN — SODIUM CHLORIDE, PRESERVATIVE FREE 10 ML: 5 INJECTION INTRAVENOUS at 08:00

## 2024-03-30 RX ADMIN — FLUDROCORTISONE ACETATE 0.1 MG: 0.1 TABLET ORAL at 08:00

## 2024-03-30 RX ADMIN — ALLOPURINOL 100 MG: 100 TABLET ORAL at 08:00

## 2024-03-30 RX ADMIN — POLYETHYLENE GLYCOL 3350 17 G: 17 POWDER, FOR SOLUTION ORAL at 20:09

## 2024-03-30 RX ADMIN — POTASSIUM BICARBONATE 20 MEQ: 782 TABLET, EFFERVESCENT ORAL at 07:48

## 2024-03-30 RX ADMIN — ATORVASTATIN CALCIUM 80 MG: 20 TABLET, FILM COATED ORAL at 20:10

## 2024-03-30 ASSESSMENT — PAIN SCALES - GENERAL
PAINLEVEL_OUTOF10: 0
PAINLEVEL_OUTOF10: 9
PAINLEVEL_OUTOF10: 7
PAINLEVEL_OUTOF10: 9
PAINLEVEL_OUTOF10: 9

## 2024-03-30 ASSESSMENT — PAIN DESCRIPTION - LOCATION
LOCATION: ABDOMEN
LOCATION: ABDOMEN

## 2024-03-30 NOTE — PROGRESS NOTES
[facility] Daily Progress Note.      Name: Elizabeth Hopkins   : 1955   MRN: [id number]   Date: 3/30/2024        Chief Complaint   Patient presents with    Hypotension         HPI:     68y old F with a PMHx of ESRD on PD, DM, CHF has been admitted recently for chest wall pain, abdominal pain and chronic epigastric pain. Has been diagnosed with cirrhosis as well as other comorbidities and is scheduled to see hepatology. She does have a history of low blood pressure and is on midodrine 3 times per day WHICH WAS STARTED ON 3/28. Found to be hypotensive despite crystalloid and colloids in ER and started on levophed.    Active Problems Being Managed:     Septic Shock  Abdominal wall cellulitis  ILD  ESRD on PD  Chronic Pain  HFpEF  Supratherapeutic INR  Lactic acidosis  Coag negative + in 1/ cultures (likely contaminant)  Adrenal insufficiency; cortisol level < 18 on 3/29      Assessment/Plan:     - continue percocet; added benadryl for pruritus  - reviewed echo results (see below) and has normal EF of 60-65%  - Based on previous notes pt has low BP's and was recently started on midodrine; clinically appears she may have developed sepsis requiring levophed.  - weaned off levophed overnight  - also on hydrocortisone and florinef added  by Nephrology  - lactate down to 2.7 after additional volume but may also be elevated due to her underlyign liver disease.  - 3/26 blood cx growing staph epidermis; ? Contaminant  - repeat blood cultures  - continue cefepime  - has h/o ILD/VERÓNICA; continue bipap  - holding warfarin for elevated INR of 2.8 which is down trending. Repeat INR in the morning and determine dose to restart.    3/27/24 2D echo:      Left Ventricle: Normal left ventricular systolic function with a visually estimated EF of 60 - 65%. Not well visualized. Left ventricle size is normal. Increased wall thickness. Unable to assess wall motion.    Right Ventricle: Not well visualized.    Mitral     Smoking status: Former     Current packs/day: 0.00     Average packs/day: 0.5 packs/day for 44.0 years (22.0 ttl pk-yrs)     Types: Cigarettes     Start date: 1970     Quit date: 2014     Years since quittin.3    Smokeless tobacco: Never   Substance Use Topics    Alcohol use: No      Family History   Problem Relation Age of Onset    Anesth Problems Neg Hx     Heart Disease Mother     No Known Problems Daughter     No Known Problems Son     No Known Problems Son          LABS AND  DATA:   Reviewed      Peak airway pressure:      Minute ventilation:        CRITICAL CARE CONSULTANT NOTE  I had a face to face encounter with the patient, reviewed and interpreted patient data including clinical events, labs, images, vital signs, I/O's, and examined patient.  I have discussed the case and the plan and management of the patient's care with the consulting services, the bedside nurses and the respiratory therapist.      NOTE OF PERSONAL INVOLVEMENT IN CARE   This patient has a high probability of imminent, clinically significant deterioration, which requires the highest level of preparedness to intervene urgently. I participated in the decision-making and personally managed or directed the management of the following life and organ supporting interventions that required my frequent assessment to treat or prevent imminent deterioration.        Arslan Moya MD   Critical Care Medicine  [facility]  [phone]  3/30/2024

## 2024-03-30 NOTE — FLOWSHEET NOTE
03/30/24 0545   Vitals   /65   Pulse 80   Respirations 10   SpO2 100 %   Observations & Evaluations   Level of Consciousness 0   Oriented X 4   Heart Rhythm Regular   Respiratory Quality/Effort Unlabored   O2 Device Nasal cannula   Skin Color Other (comment)  (Normal for ethnicity)   Skin Condition/Temp Warm;Dry   Abdomen Inspection Soft;Obese   Edema Generalized   Edema Generalized +1   Peritoneal Dialysis Catheter Mid lower abdomen   No placement date or time found.   Present on Admission/Arrival: Yes  Catheter Location: Mid lower abdomen   Status Deaccessed   Site Condition Clean, dry, intact   Dressing Status Clean, dry & intact   Dressing Gauze   Date of Last Dressing Change 03/29/24   Dialysis Type Continuous cycling   Catheter Care Given Yes  (Two minute Alcavis scrub/soak performed prior to disconnection.)     Primary RN SBAR: Daylin Bashir RN  Comments: Unable to obtain any metrics for treatment this am.  On arrival cycler found in setup.  Per the primary RN there was a \"low UF\" alarm and then the cycler displayed \"priming\" and \"set up.\"  The patient states that the cycler was \"turned on and off a few times\"  Richi Aragon NP notified of incomplete treatment.  Two minute Alcavis scrub/soak performed and patient disconnected.  Sterile mini cap placed and PD catheter taped securely to the patient's abdomen.

## 2024-03-30 NOTE — PROGRESS NOTES
Affiliated with [facility]       Resident Progress Note in Brief    S: Patient seen and examined at bedside.  Patient states that her left sided abdominal pain is improved today.  Patient continues to endorse reproducible sharp left-sided chest pain, denies pressure in the chest, shortness of breath, nausea/vomiting, or dysuria at this time.      O:  BP (!) 132/57   Pulse 87   Temp 98.7 °F (37.1 °C) (Oral)   Resp 22   Ht 1.778 m (5' 10\")   Wt 127.9 kg (282 lb)   SpO2 100%   BMI 40.46 kg/m²   Physical Examination:   General appearance - alert, well appearing, and in no distress  Chest - clear to auscultation, no wheezes, rales or rhonchi, symmetric air entry  Heart - normal rate, regular rhythm, normal S1, S2, no murmurs, rubs, clicks or gallops,   Abdomen - soft, left-sided abdominal wall tenderness significantly improved from previous exams, nondistended, no masses or organomegaly  Neurological - alert, oriented, normal speech, no focal findings  Extremities - peripheral pulses normal, continued pitting edema in the bilateral lower extremities to the knee.  Right greater than left leg tenderness to palpation  A/P:     Concern for septic shock: Improving. Potential source of PD site cellulitis. On admission meeting 3/4 SIRS criteria HR, RR, WBC 16.8. LA 2.9). CT mod ascites, right lateral abd wall cellulitis. Peritoneal fluid with WBC of 51 negative for SBP. Unable to give 30cc/kg,but LA did not respond to albumin x5 and 500cc NS bolus. Procal 0.6, LA trending up, possible 2/2 cirrhosis. RPP negative. CTA A/P on 3/29 showing no bowel ischemia, but unchanged subcutaneous abdominal wall edema/fat stranding concerning for cellulitis vs anasarca. S/p ceftriaxone (3/26) and vanc/flagyl/fluconazole (3/26-3/28). 1/4 Blood culture positive for Staph Epi, likely contaminant.   - IV cefepime(3/26-)    - IV levo gtt for MAP >65, patient requiring 3 mcg at this time. Wean as tolerated  - follow blood cultures

## 2024-03-30 NOTE — PROGRESS NOTES
[facility] Dosing Services: 03/30/24  Consult for Warfarin Dosing by Pharmacy by Dr. Marti  Consult provided for this 67 yo female for indication of Hx of VTE  Day of Therapy: resumed from home (PTA: Warfarin 2.5 mg on Wed/Fri and 1 mg on Mon/Tue/Thur/Sat/Sun)  Dose to achieve an INR goal of 2-3    Order entered for Warfarin  1 (mg) ordered to be given today at 18:00.     Significant drug interactions: PD, Abx  Previous dose    PT/INR Lab Results   Component Value Date/Time    INR 2.8 03/30/2024 02:16 AM    INR 1.8 05/03/2023 02:34 PM    INREXT 4.1 05/20/2021 12:00 AM      Platelets Lab Results   Component Value Date/Time     03/30/2024 02:16 AM      H/H Lab Results   Component Value Date/Time    HGB 7.0 03/30/2024 02:16 AM        Pharmacy to follow daily and will provide subsequent Warfarin dosing based on clinical status.  NATHANAEL MCCARTY [facility])   Contact information [phone]

## 2024-03-30 NOTE — PROGRESS NOTES
Affiliated with [facility]  [address] [id number]   Office [phone], Fax [phone]       Subjective / Objective     Subjective:  Overnight events: NAEON. Remained on CPAP.    Patient seen and examined at bedside.  She states that she is tired and has not been able to sleep overnight. She is continuing to have SOB and abdominal pain which remain unchanged from previous. No chest pain. Denies any N/V/D.    Vital Signs  Vitals:    03/30/24 1335   BP:    Pulse: 70   Resp: 20   Temp:    SpO2: 100%         Physical Exam  General: No acute distress. Alert. Cooperative.   Head: Normocephalic. Atraumatic.    ENT:             Moist mucous membranes. Sclera white. PERRL.   Respiratory: CTAB. No w/r/r. No accessory muscle use.   Cardiovascular: RRR. Normal S1,S2. No m/r/g.    GI: Abdomen soft, diffusely tender on palpation but most severe in the LLQ without guarding. No rebound tenderness. Nondistended.  PD catheter in place. No evidence of rash or infection on skin.    Extremities: Bilateral LE 2+ pitting edema, R > L.  Right LE has overlying chronic skin changes.    Skin:  Neuro: Warm. No rashes.  A&Ox4. No functional neurologic deficit noted.        I/O:    Intake/Output Summary (Last 24 hours) at 3/30/2024 1342  Last data filed at 3/30/2024 1147  Gross per 24 hour   Intake 3246.16 ml   Output --   Net 3246.16 ml          CBC:  Recent Labs     03/28/24  0523 03/29/24  0400 03/30/24  0216   WBC 13.4* 12.5* 15.0*   HGB 7.8* 7.3* 7.0*   HCT 24.3* 22.4* 21.8*    198 236         Metabolic Panel:  Recent Labs     03/28/24  0523 03/29/24  0400 03/29/24  1040 03/30/24  0216   * 133*  --  134*   K 4.2 4.3  --  3.8    100  --  99   CO2 25 26  --  26   BUN 17 18  --  19   MG 1.4* 1.6  --  2.0   ALT 10* 9*  --  12   INR 3.1* 3.3* 3.2* 2.8*           Imaging  CTA ABDOMEN PELVIS W CONTRAST  Narrative: EXAM: CTA  off pressor. Lactic acidosis continues to persist.   - Abx: IV cefepime(3/26-)   - Midodrine to 15 mg TID  - Solu-Cortef 50 mg q 6  - Florinef 0.1 mg BID  - Follow final blood cultures  - Follow paracentesis culture, though suspect no infection as fluid sample did not have many PMNs.  - Monitor lactate, though may be elevated due to cirrhosis  - Continue to monitor MAPs  - PT/OT consulted    ESRD on PD follows with Dr. Parker.   - nephro consulted, appreciate rec  - EPO 15k units MWF, for AOCD  - continue PD qhs  - continue folic acid, Klor con 20meq, Mag 30mg     Cirrhosis: Recently diagnosed. Has an upcoming appointment to establish with hepatology. Normal ALT, AST. Elevated . Albumin 1.8. GGT 63. Alpha-antitrypsin wnl. On most recent CT A/P, liver noted to have calcified granulomata but otherwise unremarkable - no cirrhotic changes noted.     ILD/ VERÓNICA follows with Dr. Chandler at [facility]. 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 On home 3L oxygen at all times.  - continue home CPAP     CAD, HFpEF, atypical angina s/p PCI x4  - cont Lipitor 80mg qd  - Hold home metoprolol   - nitroglycerin prn     Chronic back pain  - continue home Percocet 10-325mg TID prn     H/o VTE   - continue home Warfarin; pharmacy to dose     Gastroparesis did not tolerate Reglan.  - continue home erythromycin TID AC     GERD Stable  -Continue home protonix 40mg daily     Obesity: BMI of Body mass index is 40.46 kg/m².  - Encouraging lifestyle modifications and further follow up outpatient.     FEN/GI - Regular diet.   Activity - Ambulate as tolerated  DVT prophylaxis - home warfarin  GI prophylaxis - Protonix  Fall prophylaxis - Not indicated at this time.  Disposition - Admitted in ICU. Plan to d/c to TBD.  Code Status - FULL     Patient discussed with Dario Davis MD Gifty Grajulio cesar Christy MD  Family Medicine Resident       For Billing    Chief Complaint   Patient presents with    Hypotension

## 2024-03-30 NOTE — PROGRESS NOTES
[facility]    Elizabeth Hopkins  YOB: 1955          Assessment & Plan:     ESRD on CCPD ([place])  Hypotension  Cirrhosis  ILD  CAD  Anemia of chronic kidney disease  Lactic acidosis     Rec:  Continue CCPD with 2.5% D alternating with 1.5%  PD fluid not c/w peritonitis (PD TNC 5)  Continue CCPD  continue Florinef 0.1 mg twice daily and midodrine  DC IVF once 1L bag is completed  Continue EPO 15,000 unit 3 times weekly       Subjective:   CC: ESRD  HPI: had PD last night, BP better with IVF.  Current Facility-Administered Medications   Medication Dose Route Frequency    midodrine (PROAMATINE) tablet 15 mg  15 mg Oral TID    fludrocortisone (FLORINEF) tablet 0.1 mg  0.1 mg Oral BID    epoetin alba-epbx (RETACRIT) injection 15,000 Units  15,000 Units SubCUTAneous Once per day on Mon Wed Fri    dianeal lo-aura 2.5% 6,000 mL  6,000 mL IntraPERitoneal Q24H    Warfarin - Please hold dose today (3/29) due to high INR. Thanks!   Other Once    diphenhydrAMINE (BENADRYL) capsule 25 mg  25 mg Oral Q6H PRN    lactated ringers IV soln infusion   IntraVENous Continuous    pantoprazole (PROTONIX) tablet 40 mg  40 mg Oral BID AC    magnesium oxide (MAG-OX) tablet 400 mg  400 mg Oral BID    hydrocortisone sodium succinate PF (SOLU-CORTEF) injection 50 mg  50 mg IntraVENous Q8H    dianeal lo-aura 1.5% 6,000 mL  6,000 mL IntraPERitoneal Daily    dianeal lo-aura 1.5% 6,000 mL  6,000 mL IntraPERitoneal Daily    allopurinol (ZYLOPRIM) tablet 100 mg  100 mg Oral Daily    atorvastatin (LIPITOR) tablet 80 mg  80 mg Oral Nightly    erythromycin (EES) 200 MG/5ML suspension 200 mg  200 mg Oral 4x Daily PC & HS    folic acid (FOLVITE) tablet 1,000 mcg  1,000 mcg Oral Daily    [Held by provider] metoprolol succinate (TOPROL XL) extended release tablet 25 mg  25 mg Oral QHS    potassium bicarb-citric acid (EFFER-K) effervescent tablet 20 mEq  20 mEq Oral Daily    docusate  sodium (COLACE) capsule 100 mg  100 mg Oral Daily    warfarin placeholder: dosing by pharmacy   Other RX Placeholder    senna (SENOKOT) tablet 8.6 mg  1 tablet Oral Nightly    polyethylene glycol (GLYCOLAX) packet 17 g  17 g Oral BID    oxyCODONE-acetaminophen (PERCOCET)  MG per tablet 1 tablet  1 tablet Oral Q6H PRN    cefepime (MAXIPIME) 1,000 mg in sodium chloride 0.9 % 50 mL IVPB (mini-bag)  1,000 mg IntraVENous Q24H    norepinephrine (LEVOPHED) 16 mg in sodium chloride 0.9 % 250 mL infusion  0.5-20 mcg/min IntraVENous Continuous    fentaNYL (SUBLIMAZE) injection 50 mcg  50 mcg IntraVENous Once    midazolam (VERSED) injection 2 mg  2 mg IntraVENous Once    gentamicin (GARAMYCIN) 0.1 % cream   Topical Daily before dinner    sodium chloride flush 0.9 % injection 5-40 mL  5-40 mL IntraVENous 2 times per day    sodium chloride flush 0.9 % injection 5-40 mL  5-40 mL IntraVENous PRN    0.9 % sodium chloride infusion   IntraVENous PRN    ondansetron (ZOFRAN-ODT) disintegrating tablet 4 mg  4 mg Oral Q8H PRN    Or    ondansetron (ZOFRAN) injection 4 mg  4 mg IntraVENous Q6H PRN    acetaminophen (TYLENOL) tablet 650 mg  650 mg Oral Q6H PRN    Or    acetaminophen (TYLENOL) suppository 650 mg  650 mg Rectal Q6H PRN          Objective:     Vitals:  Blood pressure 111/69, pulse (!) 115, temperature 98.3 °F (36.8 °C), temperature source Oral, resp. rate 20, height 1.778 m (5' 10\"), weight 127.9 kg (282 lb), SpO2 99 %.  Temp (24hrs), Av.4 °F (36.9 °C), Min:97.9 °F (36.6 °C), Max:98.7 °F (37.1 °C)      Intake and Output:  No intake/output data recorded.   1901 -  0700  In: 3672.6 [P.O.:1600; I.V.:928]  Out: -     Physical Exam:               GENERAL ASSESSMENT: NAD  HEENT: Nontraumatic   CHEST: unlabored  HEART: reg  ABDOMEN: Soft,NT  EXTREMITY: + EDEMA  NEURO: Grossly non focal          ECG/rhythm:    Data Review        Recent Labs     24  0523 24  0400 24  0216   * 133* 134*   K 4.2

## 2024-03-30 NOTE — PLAN OF CARE
Problem: Discharge Planning  Goal: Discharge to home or other facility with appropriate resources  Outcome: Progressing     Problem: Pain  Goal: Verbalizes/displays adequate comfort level or baseline comfort level  Outcome: Progressing     Problem: Skin/Tissue Integrity  Goal: Absence of new skin breakdown  Description: 1.  Monitor for areas of redness and/or skin breakdown  2.  Assess vascular access sites hourly  3.  Every 4-6 hours minimum:  Change oxygen saturation probe site  4.  Every 4-6 hours:  If on nasal continuous positive airway pressure, respiratory therapy assess nares and determine need for appliance change or resting period.  Outcome: Progressing     Problem: Safety - Adult  Goal: Free from fall injury  Outcome: Progressing     Problem: ABCDS Injury Assessment  Goal: Absence of physical injury  Outcome: Progressing     Problem: Respiratory - Adult  Goal: Achieves optimal ventilation and oxygenation  Outcome: Progressing     Problem: Chronic Conditions and Co-morbidities  Goal: Patient's chronic conditions and co-morbidity symptoms are monitored and maintained or improved  Outcome: Progressing     Problem: Occupational Therapy - Adult  Goal: By Discharge: Performs self-care activities at highest level of function for planned discharge setting.  See evaluation for individualized goals.  Description: FUNCTIONAL STATUS PRIOR TO ADMISSION: Patient reports a history of chronic back pain due to DDD and b/l shoulder pain due severe OA.  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  She had a recent 3 week hospital stay and returned home for ~1 week before current admission.  At home, she was ambulating some using a rollator in PT but otherwise was using a wheelchair or motorized scooter to mobilize with assistance for transfers.  Her wheelchair fits in her bathroom and she required  independence within 7 day(s).  3.  Patient will transfer from bed to chair and chair to bed with supervision/set-up using the least restrictive device within 7 day(s).  4.  Patient will ambulate with supervision/set-up for 15 feet with the least restrictive device within 7 day(s).       3/29/2024 1157 by Ramon Cadena, PT  Outcome: Progressing     4am- spoke to dialysis nurse as peritoneal dialysis not working appropriately. She will come to see patient

## 2024-03-31 LAB
ALBUMIN SERPL-MCNC: 2 G/DL (ref 3.5–5)
ALBUMIN/GLOB SERPL: 0.7 (ref 1.1–2.2)
ALP SERPL-CCNC: 133 U/L (ref 45–117)
ALT SERPL-CCNC: 13 U/L (ref 12–78)
ANION GAP SERPL CALC-SCNC: 6 MMOL/L (ref 5–15)
AST SERPL-CCNC: 14 U/L (ref 15–37)
BACTERIA SPEC CULT: NORMAL
BASOPHILS # BLD: 0 K/UL (ref 0–0.1)
BASOPHILS NFR BLD: 0 % (ref 0–1)
BILIRUB SERPL-MCNC: 0.5 MG/DL (ref 0.2–1)
BUN SERPL-MCNC: 24 MG/DL (ref 6–20)
BUN/CREAT SERPL: 4 (ref 12–20)
CALCIUM SERPL-MCNC: 8.6 MG/DL (ref 8.5–10.1)
CHLORIDE SERPL-SCNC: 99 MMOL/L (ref 97–108)
CO2 SERPL-SCNC: 29 MMOL/L (ref 21–32)
CREAT SERPL-MCNC: 5.71 MG/DL (ref 0.55–1.02)
DIFFERENTIAL METHOD BLD: ABNORMAL
EOSINOPHIL # BLD: 0 K/UL (ref 0–0.4)
EOSINOPHIL NFR BLD: 0 % (ref 0–7)
ERYTHROCYTE [DISTWIDTH] IN BLOOD BY AUTOMATED COUNT: 19 % (ref 11.5–14.5)
GLOBULIN SER CALC-MCNC: 2.9 G/DL (ref 2–4)
GLUCOSE SERPL-MCNC: 208 MG/DL (ref 65–100)
GRAM STN SPEC: NORMAL
GRAM STN SPEC: NORMAL
HCT VFR BLD AUTO: 22 % (ref 35–47)
HGB BLD-MCNC: 7 G/DL (ref 11.5–16)
IMM GRANULOCYTES # BLD AUTO: 0.2 K/UL (ref 0–0.04)
IMM GRANULOCYTES NFR BLD AUTO: 2 % (ref 0–0.5)
INR PPP: 2.1 (ref 0.9–1.1)
LACTATE SERPL-SCNC: 2.2 MMOL/L (ref 0.4–2)
LYMPHOCYTES # BLD: 1.1 K/UL (ref 0.8–3.5)
LYMPHOCYTES NFR BLD: 10 % (ref 12–49)
MAGNESIUM SERPL-MCNC: 1.9 MG/DL (ref 1.6–2.4)
MCH RBC QN AUTO: 33.8 PG (ref 26–34)
MCHC RBC AUTO-ENTMCNC: 31.8 G/DL (ref 30–36.5)
MCV RBC AUTO: 106.3 FL (ref 80–99)
MONOCYTES # BLD: 0.6 K/UL (ref 0–1)
MONOCYTES NFR BLD: 6 % (ref 5–13)
NEUTS SEG # BLD: 9.5 K/UL (ref 1.8–8)
NEUTS SEG NFR BLD: 82 % (ref 32–75)
NRBC # BLD: 0.11 K/UL (ref 0–0.01)
NRBC BLD-RTO: 1 PER 100 WBC
PLATELET # BLD AUTO: 217 K/UL (ref 150–400)
PMV BLD AUTO: 10.7 FL (ref 8.9–12.9)
POTASSIUM SERPL-SCNC: 3.8 MMOL/L (ref 3.5–5.1)
PROT SERPL-MCNC: 4.9 G/DL (ref 6.4–8.2)
PROTHROMBIN TIME: 20.9 SEC (ref 9–11.1)
RBC # BLD AUTO: 2.07 M/UL (ref 3.8–5.2)
SERVICE CMNT-IMP: NORMAL
SODIUM SERPL-SCNC: 134 MMOL/L (ref 136–145)
WBC # BLD AUTO: 11.5 K/UL (ref 3.6–11)

## 2024-03-31 PROCEDURE — 83735 ASSAY OF MAGNESIUM: CPT

## 2024-03-31 PROCEDURE — 6370000000 HC RX 637 (ALT 250 FOR IP): Performed by: FAMILY MEDICINE

## 2024-03-31 PROCEDURE — 6370000000 HC RX 637 (ALT 250 FOR IP)

## 2024-03-31 PROCEDURE — 2580000003 HC RX 258

## 2024-03-31 PROCEDURE — 94660 CPAP INITIATION&MGMT: CPT

## 2024-03-31 PROCEDURE — 6360000002 HC RX W HCPCS

## 2024-03-31 PROCEDURE — 2580000003 HC RX 258: Performed by: INTERNAL MEDICINE

## 2024-03-31 PROCEDURE — 85025 COMPLETE CBC W/AUTO DIFF WBC: CPT

## 2024-03-31 PROCEDURE — 85610 PROTHROMBIN TIME: CPT

## 2024-03-31 PROCEDURE — 6360000002 HC RX W HCPCS: Performed by: INTERNAL MEDICINE

## 2024-03-31 PROCEDURE — 2580000003 HC RX 258: Performed by: HOSPITALIST

## 2024-03-31 PROCEDURE — 2000000000 HC ICU R&B

## 2024-03-31 PROCEDURE — 6360000002 HC RX W HCPCS: Performed by: HOSPITALIST

## 2024-03-31 PROCEDURE — 36415 COLL VENOUS BLD VENIPUNCTURE: CPT

## 2024-03-31 PROCEDURE — 94761 N-INVAS EAR/PLS OXIMETRY MLT: CPT

## 2024-03-31 PROCEDURE — A4722 DIALYS SOL FLD VOL > 1999CC: HCPCS | Performed by: INTERNAL MEDICINE

## 2024-03-31 PROCEDURE — 80053 COMPREHEN METABOLIC PANEL: CPT

## 2024-03-31 PROCEDURE — 83605 ASSAY OF LACTIC ACID: CPT

## 2024-03-31 PROCEDURE — 6370000000 HC RX 637 (ALT 250 FOR IP): Performed by: HOSPITALIST

## 2024-03-31 PROCEDURE — 99232 SBSQ HOSP IP/OBS MODERATE 35: CPT | Performed by: FAMILY MEDICINE

## 2024-03-31 PROCEDURE — 2700000000 HC OXYGEN THERAPY PER DAY

## 2024-03-31 PROCEDURE — 6370000000 HC RX 637 (ALT 250 FOR IP): Performed by: INTERNAL MEDICINE

## 2024-03-31 RX ORDER — PROCHLORPERAZINE EDISYLATE 5 MG/ML
10 INJECTION INTRAMUSCULAR; INTRAVENOUS EVERY 6 HOURS PRN
Status: DISCONTINUED | OUTPATIENT
Start: 2024-03-31 | End: 2024-04-02 | Stop reason: HOSPADM

## 2024-03-31 RX ORDER — WARFARIN SODIUM 2 MG/1
2 TABLET ORAL
Status: DISCONTINUED | OUTPATIENT
Start: 2024-03-31 | End: 2024-03-31

## 2024-03-31 RX ORDER — WARFARIN SODIUM 5 MG/1
2.5 TABLET ORAL
Status: COMPLETED | OUTPATIENT
Start: 2024-03-31 | End: 2024-03-31

## 2024-03-31 RX ADMIN — POTASSIUM BICARBONATE 20 MEQ: 782 TABLET, EFFERVESCENT ORAL at 07:29

## 2024-03-31 RX ADMIN — SODIUM CHLORIDE, SODIUM LACTATE, CALCIUM CHLORIDE, MAGNESIUM CHLORIDE AND DEXTROSE 6000 ML: 1.5; 538; 448; 18.3; 5.08 INJECTION, SOLUTION INTRAPERITONEAL at 14:43

## 2024-03-31 RX ADMIN — DOCUSATE SODIUM 100 MG: 100 CAPSULE, LIQUID FILLED ORAL at 07:31

## 2024-03-31 RX ADMIN — PANTOPRAZOLE SODIUM 40 MG: 40 TABLET, DELAYED RELEASE ORAL at 14:56

## 2024-03-31 RX ADMIN — SODIUM CHLORIDE, SODIUM LACTATE, CALCIUM CHLORIDE, MAGNESIUM CHLORIDE AND DEXTROSE 6000 ML: 2.5; 538; 448; 18.3; 5.08 INJECTION, SOLUTION INTRAPERITONEAL at 14:43

## 2024-03-31 RX ADMIN — SODIUM CHLORIDE, PRESERVATIVE FREE 10 ML: 5 INJECTION INTRAVENOUS at 07:31

## 2024-03-31 RX ADMIN — ATORVASTATIN CALCIUM 80 MG: 20 TABLET, FILM COATED ORAL at 23:37

## 2024-03-31 RX ADMIN — HYDROCORTISONE SODIUM SUCCINATE 50 MG: 100 INJECTION, POWDER, FOR SOLUTION INTRAMUSCULAR; INTRAVENOUS at 05:55

## 2024-03-31 RX ADMIN — OXYCODONE AND ACETAMINOPHEN 1 TABLET: 10; 325 TABLET ORAL at 23:36

## 2024-03-31 RX ADMIN — ONDANSETRON 4 MG: 2 INJECTION INTRAMUSCULAR; INTRAVENOUS at 18:44

## 2024-03-31 RX ADMIN — GENTAMICIN SULFATE: 1 CREAM TOPICAL at 14:44

## 2024-03-31 RX ADMIN — HYDROCORTISONE SODIUM SUCCINATE 50 MG: 100 INJECTION, POWDER, FOR SOLUTION INTRAMUSCULAR; INTRAVENOUS at 12:10

## 2024-03-31 RX ADMIN — POLYETHYLENE GLYCOL 3350 17 G: 17 POWDER, FOR SOLUTION ORAL at 07:29

## 2024-03-31 RX ADMIN — PANTOPRAZOLE SODIUM 40 MG: 40 TABLET, DELAYED RELEASE ORAL at 05:57

## 2024-03-31 RX ADMIN — FOLIC ACID 1000 MCG: 1 TABLET ORAL at 07:31

## 2024-03-31 RX ADMIN — CEFEPIME 1000 MG: 1 INJECTION, POWDER, FOR SOLUTION INTRAMUSCULAR; INTRAVENOUS at 08:53

## 2024-03-31 RX ADMIN — PROCHLORPERAZINE EDISYLATE 10 MG: 5 INJECTION INTRAMUSCULAR; INTRAVENOUS at 23:37

## 2024-03-31 RX ADMIN — FLUDROCORTISONE ACETATE 0.1 MG: 0.1 TABLET ORAL at 07:30

## 2024-03-31 RX ADMIN — HYDROCORTISONE SODIUM SUCCINATE 50 MG: 100 INJECTION, POWDER, FOR SOLUTION INTRAMUSCULAR; INTRAVENOUS at 23:36

## 2024-03-31 RX ADMIN — SODIUM CHLORIDE, PRESERVATIVE FREE 20 ML: 5 INJECTION INTRAVENOUS at 23:38

## 2024-03-31 RX ADMIN — WARFARIN SODIUM 2.5 MG: 5 TABLET ORAL at 18:45

## 2024-03-31 RX ADMIN — MIDODRINE HYDROCHLORIDE 15 MG: 5 TABLET ORAL at 23:36

## 2024-03-31 RX ADMIN — Medication 400 MG: at 07:30

## 2024-03-31 RX ADMIN — ERYTHROMYCIN 200 MG: 200 SUSPENSION ORAL at 12:10

## 2024-03-31 RX ADMIN — MIDODRINE HYDROCHLORIDE 15 MG: 5 TABLET ORAL at 12:10

## 2024-03-31 RX ADMIN — FLUDROCORTISONE ACETATE 0.1 MG: 0.1 TABLET ORAL at 23:37

## 2024-03-31 RX ADMIN — ERYTHROMYCIN 200 MG: 200 SUSPENSION ORAL at 08:53

## 2024-03-31 RX ADMIN — ALLOPURINOL 100 MG: 100 TABLET ORAL at 07:31

## 2024-03-31 RX ADMIN — MIDODRINE HYDROCHLORIDE 15 MG: 5 TABLET ORAL at 07:30

## 2024-03-31 RX ADMIN — OXYCODONE AND ACETAMINOPHEN 1 TABLET: 10; 325 TABLET ORAL at 14:56

## 2024-03-31 ASSESSMENT — PAIN DESCRIPTION - ORIENTATION: ORIENTATION: MID

## 2024-03-31 ASSESSMENT — PAIN SCALES - GENERAL
PAINLEVEL_OUTOF10: 0
PAINLEVEL_OUTOF10: 0
PAINLEVEL_OUTOF10: 6
PAINLEVEL_OUTOF10: 0
PAINLEVEL_OUTOF10: 0
PAINLEVEL_OUTOF10: 6

## 2024-03-31 ASSESSMENT — PAIN DESCRIPTION - LOCATION
LOCATION: ABDOMEN
LOCATION: ABDOMEN

## 2024-03-31 ASSESSMENT — PAIN SCALES - WONG BAKER
WONGBAKER_NUMERICALRESPONSE: NO HURT

## 2024-03-31 ASSESSMENT — PAIN DESCRIPTION - DESCRIPTORS
DESCRIPTORS: ACHING;CRAMPING
DESCRIPTORS: ACHING

## 2024-03-31 NOTE — PLAN OF CARE
Problem: Discharge Planning  Goal: Discharge to home or other facility with appropriate resources  Outcome: Progressing     Problem: Pain  Goal: Verbalizes/displays adequate comfort level or baseline comfort level  Outcome: Progressing     Problem: Skin/Tissue Integrity  Goal: Absence of new skin breakdown  Description: 1.  Monitor for areas of redness and/or skin breakdown  2.  Assess vascular access sites hourly  3.  Every 4-6 hours minimum:  Change oxygen saturation probe site  4.  Every 4-6 hours:  If on nasal continuous positive airway pressure, respiratory therapy assess nares and determine need for appliance change or resting period.  Outcome: Progressing     Problem: Safety - Adult  Goal: Free from fall injury  Outcome: Progressing     Problem: ABCDS Injury Assessment  Goal: Absence of physical injury  Outcome: Progressing     Problem: Respiratory - Adult  Goal: Achieves optimal ventilation and oxygenation  Outcome: Progressing     Problem: Chronic Conditions and Co-morbidities  Goal: Patient's chronic conditions and co-morbidity symptoms are monitored and maintained or improved  Outcome: Progressing

## 2024-03-31 NOTE — PROGRESS NOTES
Affiliated with [facility]  [address] [id number]   Office [phone], Fax [phone]       Subjective / Objective     Subjective:  Overnight events: NAEON. Remained on CPAP.    Patient seen and examined at bedside. No acute complaints. Slept well overnight. Not symptomatic when blood pressure is low.     Vital Signs  Vitals:    03/31/24 1232   BP: (!) 83/54   Pulse: 86   Resp: 20   Temp:    SpO2: 100%         Physical Exam  General: No acute distress. Alert. Cooperative.   Head: Normocephalic. Atraumatic.    ENT:             Moist mucous membranes. Sclera white. PERRL.   Respiratory: CTAB. No w/r/r. No accessory muscle use.   Cardiovascular: RRR. Normal S1,S2. No m/r/g.    GI: Abdomen soft, nontender. No rebound tenderness. Nondistended.  PD catheter in place. No evidence of rash or infection on skin.    Extremities: Bilateral LE 2+ pitting edema, R > L.  Right LE has overlying chronic skin changes.    Skin:  Neuro: Warm. No rashes.  A&Ox4. No functional neurologic deficit noted.        I/O:    Intake/Output Summary (Last 24 hours) at 3/31/2024 1341  Last data filed at 3/31/2024 1222  Gross per 24 hour   Intake 530.31 ml   Output --   Net 530.31 ml          CBC:  Recent Labs     03/29/24  0400 03/30/24  0216 03/31/24  0545   WBC 12.5* 15.0* 11.5*   HGB 7.3* 7.0* 7.0*   HCT 22.4* 21.8* 22.0*    236 217         Metabolic Panel:  Recent Labs     03/29/24  0400 03/29/24  1040 03/30/24  0216 03/31/24  0545   *  --  134* 134*   K 4.3  --  3.8 3.8     --  99 99   CO2 26  --  26 29   BUN 18  --  19 24*   MG 1.6  --  2.0 1.9   ALT 9*  --  12 13   INR 3.3* 3.2* 2.8* 2.1*           Imaging  CTA ABDOMEN PELVIS W CONTRAST  Narrative: EXAM: CTA ABDOMEN PELVIS W CONTRAST    INDICATION: concern for mesenteric ischemia, rising lactic acid, abdominal pain    COMPARISON: CT abdomen/pelvis 3/26/2024 and additional  mesenteric artery is patent, but calcified atherosclerotic disease results  in moderate stenosis at its ostium. The inferior mesenteric artery is patent  with probable stenosis at its ostium.  REPRODUCTIVE ORGANS: Hysterectomy.  URINARY BLADDER: No mass or calculus.  BONES: No destructive bone lesion.  ABDOMINAL WALL: There is right lateral abdominal wall and flank subcutaneous  edema and fat stranding. No focal fluid collection.  ADDITIONAL COMMENTS: N/A  Impression: 1.  Atherosclerotic disease results in moderate focal stenosis of the origin of  the SMA and VANDANA, but there are no signs of bowel ischemia.  2.  Unchanged right lateral abdominal wall and right flank subcutaneous edema  and fat stranding, which could reflect cellulitis or asymmetric anasarca.              Assessment and Plan     Elizabeth Hopkins is a 68 y.o. female with pmhx of ESRD, CAD, HTN, VTE, DM, ILD, pancreatitis who is admitted for hypotension and sepsis.     Shock: Suspected likely septic in nature, though may have component of adrenal insufficiency. Potential source of PD site cellulitis. Hypotension is acute on chronic, patient is on home midodrine 10mg TID. On admission meeting 3/4 SIRS criteria HR, RR, WBC 16.8. LA 2.9). CT mod ascites, right lateral abd wall cellulitis. Peritoneal fluid with WBC of 51 negative for SBP. Unable to give 30cc/kg,but LA did not respond to albumin x5 and 500cc NS bolus. Procal 0.6, LA trending up, possible 2/2 cirrhosis. RPP negative. CTA A/P on 3/29 showing no bowel ischemia, but unchanged subcutaneous abdominal wall edema/fat stranding concerning for cellulitis vs anasarca. S/p ceftriaxone (3/26) and vanc/flagyl/fluconazole (3/26-3/28). 1/4 Blood culture positive for Staph Epi, likely contaminant. S/p levo gtt (3/26-3/29). Improving, now off pressor. Lactic acidosis continues to persist.   - Abx: IV cefepime(3/26-)   - Midodrine to 15 mg TID  - Solu-Cortef 50 mg q 6  - Florinef 0.1 mg BID  - Follow final

## 2024-03-31 NOTE — FLOWSHEET NOTE
CCPD Connection Note:     03/31/24 1431   Vitals   /66   Temp 98.2 °F (36.8 °C)   Temp Source Oral   Pulse 81   Respirations 18   SpO2 100 %   Observations & Evaluations   Level of Consciousness 0   Heart Rhythm Regular   Respiratory Quality/Effort Unlabored   O2 Device Nasal cannula   Skin Condition/Temp Dry;Warm   Abdomen Inspection Obese;Soft   Edema Generalized   Peritoneal Dialysis Catheter Mid lower abdomen   No placement date or time found.   Present on Admission/Arrival: Yes  Catheter Location: Mid lower abdomen   Status Accessed   Site Condition Clean, dry, intact   Dressing Status New dressing applied;Clean, dry & intact   Dressing Gauze   Date of Last Dressing Change 03/31/24   Dialysis Type Continuous cycling   Exit Site Condition Good   Catheter Care Given Yes   Cycler   Verification of Prescription CCPD   Total Volume Programmed 34235 mL   Therapy Time (Hours:Minutes) 9:00   Cycler Type [facility]en [facility]   Fill Volume 3000 mL   Last Fill Volume 0 mL   Number of Cycles 5   Bag Volume 6000 mL   Number of Bags Used 3   Dianeal Solution   (Bag 1) 2.5% Dextrose; (Bag 2 & 3) 1.5% Dextrose)     Primary RN SBAR: JOANNA Lara RN  Patient Education: PD cath infection prevention & control.  Hepatitis B Surface Ag   Date/Time Value Ref Range Status   03/28/2024 05:23 AM <0.10 Index Final     Hep B S Ab   Date/Time Value Ref Range Status   03/28/2024 05:23 AM <3.10 mIU/mL Final     Pt orders, notes, labs, code status and consent reviewed. Time out complete.       Right abdominal PD site cleansed with Exsept followed by Gentamycin and dry gauze dressing dated 3/31/24, no redness or drainage at exit site. Prior to connection, one minute Alcavis scrub & soak performed.    PD Start Time 1445   PD End Time: 2345     Remained present during initial drain followed by initiation of first fill. Upon departure, bed in lowest position & personal belongings within reach.

## 2024-03-31 NOTE — FLOWSHEET NOTE
Peritoneal Dialysis Initiation / [phone]    Primary RN SBAR: Eliza, RN  Patient Education: access/site care    Hepatitis B Surface Ag   Date/Time Value Ref Range Status   03/28/2024 05:23 AM <0.10 Index Final     Hep B S Ab   Date/Time Value Ref Range Status   03/28/2024 05:23 AM <3.10 mIU/mL Final       03/30/24 1940   Vitals   BP (!) 128/46   Temp 97.9 °F (36.6 °C)   Temp Source Oral   Pulse 72   Respirations 20   SpO2 100 %   Observations & Evaluations   Level of Consciousness 0   Oriented X 4   Heart Rhythm Regular   Respiratory Quality/Effort Unlabored   O2 Device None (Room air)   Bilateral Breath Sounds Clear   Skin Color   (appropriate for ethnicity)   Skin Condition/Temp Warm;Dry   Abdomen Inspection Obese;Rounded   Edema Generalized;Right lower extremity;Left lower extremity   Edema Generalized +1   RLE Edema +1   LLE Edema +1   Peritoneal Dialysis Catheter Mid lower abdomen   No placement date or time found.   Present on Admission/Arrival: Yes  Catheter Location: Mid lower abdomen   Status Accessed   Site Condition Clean, dry, intact   Dressing Status New dressing applied;Clean, dry & intact   Dressing Gauze   Date of Last Dressing Change 03/30/24   Dialysis Type Continuous cycling   Exit Site Condition good   Catheter Care Given Yes   Cycler   Verification of Prescription CCPD   Informed Consent    (chronic consent applies)   Total Volume Programmed 97387 mL   Therapy Time (Hours:Minutes) 9   Cycler Type [facility]   Fill Volume 3000 mL   Last Fill Volume 0 mL   Dextrose Setting Same (Nonextraneal)   I Drain Alarm 0 mL   Number of Cycles 5   Bag Volume 6000 mL   Number of Bags Used 3   Dianeal Solution   ((Bag 1) 2.5% dextrose in 6000 ml, (Bag 2/.3) 1.5% dextrose in 6000 ml x 2)     Ely RN at bedside to initiate CCPD tx for the night. Pt orders, notes, labs, code status reviewed.     Remained present during initial drain followed by initiation of first fill. Prior to departure, bed in

## 2024-03-31 NOTE — FLOWSHEET NOTE
Peritoneal Dialysis Disconnection / [phone]    Primary RN SBAR: Hubert, RN  Patient Education: access/site care    Hepatitis B Surface Ag   Date/Time Value Ref Range Status   03/28/2024 05:23 AM <0.10 Index Final     Hep B S Ab   Date/Time Value Ref Range Status   03/28/2024 05:23 AM <3.10 mIU/mL Final       03/31/24 0503   Peritoneal Dialysis Catheter Mid lower abdomen   No placement date or time found.   Present on Admission/Arrival: Yes  Catheter Location: Mid lower abdomen   Status Deaccessed   Site Condition Clean, dry, intact   Dressing Status Clean, dry & intact   Dressing Gauze   Date of Last Dressing Change 03/30/24   Dialysis Type Continuous cycling   Exit Site Condition good   Catheter Care Given Yes   Post-Treatment (Cycler)   Average Dwell Time (Hours:Minutes) 0:34   Lost Dwell Time (Hours:Minutes) 2:58   Effluent Appearance Clear;Yellow   Volume Gain (mL) 163 mL  (ID 17 ml + TUF (-180 ml) =163)     Ely RN at bedside to disconnect pt from CCPD tx. Orders, consent, pt, and code status confirmed. Tx completed. Used cassette and bags discarded. Education and pre/post report given to primary RN.    Upon arrival, pt cycler alarming \"low UF.\"  Pt repositioned and resumed drain.  Pt positive post cycler tx - nephrology on call, Yobani Aragon NP, updated on pt condition - NP states yessi LEE will reassess this AM - no new orders at this time.    Net fluid gain: 163 ml

## 2024-03-31 NOTE — PROGRESS NOTES
[facility] Dosing Services: 03/31/24  Consult for Warfarin Dosing by Pharmacy by Dr. Marti  Consult provided for this 67 yo female for indication of Hx of VTE  Day of Therapy: resumed from home (PTA: Warfarin 2.5 mg on Wed/Fri and 1 mg on Mon/Tue/Thur/Sat/Sun)  Dose to achieve an INR goal of 2-3    Order entered for Warfarin  2.5 (mg) ordered to be given today at 18:00.     Significant drug interactions: PD, Abx  Previous dose    PT/INR Lab Results   Component Value Date/Time    INR 2.1 03/31/2024 05:45 AM    INR 1.8 05/03/2023 02:34 PM    INREXT 4.1 05/20/2021 12:00 AM      Platelets Lab Results   Component Value Date/Time     03/31/2024 05:45 AM      H/H Lab Results   Component Value Date/Time    HGB 7.0 03/31/2024 05:45 AM        Pharmacy to follow daily and will provide subsequent Warfarin dosing based on clinical status.  NATHANAEL MCCARTY [facility])   Contact information [phone]

## 2024-04-01 LAB
ALBUMIN SERPL-MCNC: 2 G/DL (ref 3.5–5)
ALBUMIN/GLOB SERPL: 0.7 (ref 1.1–2.2)
ALP SERPL-CCNC: 132 U/L (ref 45–117)
ALT SERPL-CCNC: 19 U/L (ref 12–78)
ANION GAP SERPL CALC-SCNC: 6 MMOL/L (ref 5–15)
AST SERPL-CCNC: 26 U/L (ref 15–37)
BACTERIA SPEC CULT: NORMAL
BASOPHILS # BLD: 0 K/UL (ref 0–0.1)
BASOPHILS NFR BLD: 0 % (ref 0–1)
BILIRUB SERPL-MCNC: 0.8 MG/DL (ref 0.2–1)
BUN SERPL-MCNC: 25 MG/DL (ref 6–20)
BUN/CREAT SERPL: 4 (ref 12–20)
CALCIUM SERPL-MCNC: 8.8 MG/DL (ref 8.5–10.1)
CHLORIDE SERPL-SCNC: 97 MMOL/L (ref 97–108)
CO2 SERPL-SCNC: 30 MMOL/L (ref 21–32)
CREAT SERPL-MCNC: 5.78 MG/DL (ref 0.55–1.02)
DIFFERENTIAL METHOD BLD: ABNORMAL
EOSINOPHIL # BLD: 0 K/UL (ref 0–0.4)
EOSINOPHIL NFR BLD: 0 % (ref 0–7)
ERYTHROCYTE [DISTWIDTH] IN BLOOD BY AUTOMATED COUNT: 18.8 % (ref 11.5–14.5)
GLOBULIN SER CALC-MCNC: 2.9 G/DL (ref 2–4)
GLUCOSE SERPL-MCNC: 173 MG/DL (ref 65–100)
HCT VFR BLD AUTO: 22.7 % (ref 35–47)
HGB BLD-MCNC: 7.3 G/DL (ref 11.5–16)
IMM GRANULOCYTES # BLD AUTO: 0.4 K/UL (ref 0–0.04)
IMM GRANULOCYTES NFR BLD AUTO: 3 % (ref 0–0.5)
INR PPP: 2.1 (ref 0.9–1.1)
LACTATE SERPL-SCNC: 2.5 MMOL/L (ref 0.4–2)
LYMPHOCYTES # BLD: 1.3 K/UL (ref 0.8–3.5)
LYMPHOCYTES NFR BLD: 10 % (ref 12–49)
MAGNESIUM SERPL-MCNC: 1.9 MG/DL (ref 1.6–2.4)
MCH RBC QN AUTO: 33.6 PG (ref 26–34)
MCHC RBC AUTO-ENTMCNC: 32.2 G/DL (ref 30–36.5)
MCV RBC AUTO: 104.6 FL (ref 80–99)
MONOCYTES # BLD: 0.8 K/UL (ref 0–1)
MONOCYTES NFR BLD: 6 % (ref 5–13)
NEUTS SEG # BLD: 10.3 K/UL (ref 1.8–8)
NEUTS SEG NFR BLD: 81 % (ref 32–75)
NRBC # BLD: 0.2 K/UL (ref 0–0.01)
NRBC BLD-RTO: 1.6 PER 100 WBC
PLATELET # BLD AUTO: 232 K/UL (ref 150–400)
PMV BLD AUTO: 11 FL (ref 8.9–12.9)
POTASSIUM SERPL-SCNC: 4 MMOL/L (ref 3.5–5.1)
PROT SERPL-MCNC: 4.9 G/DL (ref 6.4–8.2)
PROTHROMBIN TIME: 20.6 SEC (ref 9–11.1)
RBC # BLD AUTO: 2.17 M/UL (ref 3.8–5.2)
RBC MORPH BLD: ABNORMAL
RBC MORPH BLD: ABNORMAL
SERVICE CMNT-IMP: NORMAL
SODIUM SERPL-SCNC: 133 MMOL/L (ref 136–145)
WBC # BLD AUTO: 12.8 K/UL (ref 3.6–11)

## 2024-04-01 PROCEDURE — 6370000000 HC RX 637 (ALT 250 FOR IP)

## 2024-04-01 PROCEDURE — 2060000000 HC ICU INTERMEDIATE R&B

## 2024-04-01 PROCEDURE — 90945 DIALYSIS ONE EVALUATION: CPT

## 2024-04-01 PROCEDURE — 83735 ASSAY OF MAGNESIUM: CPT

## 2024-04-01 PROCEDURE — 99232 SBSQ HOSP IP/OBS MODERATE 35: CPT | Performed by: FAMILY MEDICINE

## 2024-04-01 PROCEDURE — 94660 CPAP INITIATION&MGMT: CPT

## 2024-04-01 PROCEDURE — 85610 PROTHROMBIN TIME: CPT

## 2024-04-01 PROCEDURE — 80053 COMPREHEN METABOLIC PANEL: CPT

## 2024-04-01 PROCEDURE — 6360000002 HC RX W HCPCS

## 2024-04-01 PROCEDURE — 85025 COMPLETE CBC W/AUTO DIFF WBC: CPT

## 2024-04-01 PROCEDURE — 2700000000 HC OXYGEN THERAPY PER DAY

## 2024-04-01 PROCEDURE — 36415 COLL VENOUS BLD VENIPUNCTURE: CPT

## 2024-04-01 PROCEDURE — 6360000002 HC RX W HCPCS: Performed by: HOSPITALIST

## 2024-04-01 PROCEDURE — 2580000003 HC RX 258

## 2024-04-01 PROCEDURE — 2580000003 HC RX 258: Performed by: INTERNAL MEDICINE

## 2024-04-01 PROCEDURE — 6360000002 HC RX W HCPCS: Performed by: INTERNAL MEDICINE

## 2024-04-01 PROCEDURE — 94761 N-INVAS EAR/PLS OXIMETRY MLT: CPT

## 2024-04-01 RX ORDER — SODIUM CHLORIDE, SODIUM LACTATE, CALCIUM CHLORIDE, MAGNESIUM CHLORIDE AND DEXTROSE 2.5; 538; 448; 18.3; 5.08 G/100ML; MG/100ML; MG/100ML; MG/100ML; MG/100ML
6000 INJECTION, SOLUTION INTRAPERITONEAL EVERY 24 HOURS
Status: DISCONTINUED | OUTPATIENT
Start: 2024-04-01 | End: 2024-04-02 | Stop reason: HOSPADM

## 2024-04-01 RX ORDER — WARFARIN SODIUM 1 MG/1
1 TABLET ORAL
Status: COMPLETED | OUTPATIENT
Start: 2024-04-01 | End: 2024-04-01

## 2024-04-01 RX ADMIN — PANTOPRAZOLE SODIUM 40 MG: 40 TABLET, DELAYED RELEASE ORAL at 05:46

## 2024-04-01 RX ADMIN — SODIUM CHLORIDE, SODIUM LACTATE, CALCIUM CHLORIDE, MAGNESIUM CHLORIDE AND DEXTROSE 6000 ML: 2.5; 538; 448; 18.3; 5.08 INJECTION, SOLUTION INTRAPERITONEAL at 20:43

## 2024-04-01 RX ADMIN — ONDANSETRON 4 MG: 2 INJECTION INTRAMUSCULAR; INTRAVENOUS at 08:44

## 2024-04-01 RX ADMIN — FOLIC ACID 1000 MCG: 1 TABLET ORAL at 08:45

## 2024-04-01 RX ADMIN — HYDROCORTISONE SODIUM SUCCINATE 50 MG: 100 INJECTION, POWDER, FOR SOLUTION INTRAMUSCULAR; INTRAVENOUS at 05:45

## 2024-04-01 RX ADMIN — EPOETIN ALFA-EPBX 15000 UNITS: 10000 INJECTION, SOLUTION INTRAVENOUS; SUBCUTANEOUS at 17:15

## 2024-04-01 RX ADMIN — HYDROCORTISONE SODIUM SUCCINATE 50 MG: 100 INJECTION, POWDER, FOR SOLUTION INTRAMUSCULAR; INTRAVENOUS at 13:42

## 2024-04-01 RX ADMIN — Medication 400 MG: at 08:45

## 2024-04-01 RX ADMIN — MIDODRINE HYDROCHLORIDE 15 MG: 5 TABLET ORAL at 13:42

## 2024-04-01 RX ADMIN — SENNOSIDES 8.6 MG: 8.6 TABLET, FILM COATED ORAL at 21:40

## 2024-04-01 RX ADMIN — ATORVASTATIN CALCIUM 80 MG: 20 TABLET, FILM COATED ORAL at 21:37

## 2024-04-01 RX ADMIN — POLYETHYLENE GLYCOL 3350 17 G: 17 POWDER, FOR SOLUTION ORAL at 08:45

## 2024-04-01 RX ADMIN — WARFARIN SODIUM 1 MG: 1 TABLET ORAL at 17:15

## 2024-04-01 RX ADMIN — PANTOPRAZOLE SODIUM 40 MG: 40 TABLET, DELAYED RELEASE ORAL at 17:15

## 2024-04-01 RX ADMIN — SODIUM CHLORIDE, SODIUM LACTATE, CALCIUM CHLORIDE, MAGNESIUM CHLORIDE AND DEXTROSE 6000 ML: 2.5; 538; 448; 18.3; 5.08 INJECTION, SOLUTION INTRAPERITONEAL at 20:44

## 2024-04-01 RX ADMIN — SODIUM CHLORIDE, PRESERVATIVE FREE 10 ML: 5 INJECTION INTRAVENOUS at 08:46

## 2024-04-01 RX ADMIN — Medication 400 MG: at 21:39

## 2024-04-01 RX ADMIN — GENTAMICIN SULFATE: 1 CREAM TOPICAL at 20:45

## 2024-04-01 RX ADMIN — ONDANSETRON 4 MG: 2 INJECTION INTRAMUSCULAR; INTRAVENOUS at 21:35

## 2024-04-01 RX ADMIN — MIDODRINE HYDROCHLORIDE 15 MG: 5 TABLET ORAL at 08:45

## 2024-04-01 RX ADMIN — SODIUM CHLORIDE, PRESERVATIVE FREE 10 ML: 5 INJECTION INTRAVENOUS at 21:40

## 2024-04-01 RX ADMIN — ALLOPURINOL 100 MG: 100 TABLET ORAL at 08:45

## 2024-04-01 ASSESSMENT — PAIN DESCRIPTION - ONSET
ONSET: ON-GOING
ONSET: ON-GOING

## 2024-04-01 ASSESSMENT — PAIN DESCRIPTION - DESCRIPTORS
DESCRIPTORS: ACHING;BURNING;CRAMPING
DESCRIPTORS: BURNING;CRAMPING;ACHING

## 2024-04-01 ASSESSMENT — PAIN DESCRIPTION - ORIENTATION
ORIENTATION: MID
ORIENTATION: MID

## 2024-04-01 ASSESSMENT — PAIN SCALES - GENERAL
PAINLEVEL_OUTOF10: 0
PAINLEVEL_OUTOF10: 8

## 2024-04-01 ASSESSMENT — PAIN DESCRIPTION - FREQUENCY
FREQUENCY: CONTINUOUS
FREQUENCY: CONTINUOUS

## 2024-04-01 ASSESSMENT — PAIN DESCRIPTION - PAIN TYPE
TYPE: ACUTE PAIN
TYPE: ACUTE PAIN

## 2024-04-01 ASSESSMENT — PAIN DESCRIPTION - LOCATION
LOCATION: ABDOMEN
LOCATION: ABDOMEN

## 2024-04-01 NOTE — PROGRESS NOTES
@1500 TRANSFER - OUT REPORT:    Verbal report given to Kathy FARAH on Elizabeth Hopkins  being transferred to [facility] for routine progression of patient care       Report consisted of patient's Situation, Background, Assessment and   Recommendations(SBAR).     Information from the following report(s) Nurse Handoff Report, Index, ED Encounter Summary, ED SBAR, Adult Overview, Intake/Output, MAR, Recent Results, Med Rec Status, Cardiac Rhythm Sinus, Alarm Parameters, and Event Log was reviewed with the receiving nurse.           Lines:   CVC Triple Lumen 03/27/24 Right Internal jugular (Active)   Central Line Being Utilized Yes 04/01/24 1200   Criteria for Appropriate Use Irritant/vesicant medication 04/01/24 1200   Site Assessment Clean, dry & intact 04/01/24 1200   Phlebitis Assessment No symptoms 04/01/24 1200   Infiltration Assessment 0 04/01/24 1200   Proximal Lumen Color/Status White;Infusing;Flushed 04/01/24 1200   Medial Lumen Status Blue;Infusing;Normal saline locked 04/01/24 1200   Distal Lumen Color/Status Brown;Flushed;Alcohol cap present 04/01/24 1200   Line Care Connections checked and tightened;Chlorhexidine wipes;Cap changed 04/01/24 1200   Alcohol Cap Used Yes 04/01/24 1200   Date of Last Dressing Change 03/29/24 04/01/24 1200   Dressing Type Transparent w/CHG gel 04/01/24 1200   Dressing Status Clean, dry & intact;New dressing applied 04/01/24 1200   Dressing Intervention Dressing changed;New 03/30/24 0351       Peripheral IV 03/26/24 Right Antecubital (Active)   Site Assessment Clean, dry & intact 04/01/24 1200   Line Status Flushed;Capped 04/01/24 1200   Line Care Connections checked and tightened 04/01/24 1200   Phlebitis Assessment No symptoms 04/01/24 1200   Infiltration Assessment 0 04/01/24 1200   Alcohol Cap Used Yes 04/01/24 1200   Dressing Status Clean, dry & intact 04/01/24 1200   Dressing Type Transparent 04/01/24 1200       Peripheral IV 03/26/24 Right Forearm (Active)   Site Assessment

## 2024-04-01 NOTE — PROGRESS NOTES
[facility]    Elizabeth Hopkins  YOB: 1955          Assessment & Plan:     ESRD on CCPD ([place])  Hypotension  Cirrhosis  ILD  CAD  Anemia of chronic kidney disease  Lactic acidosis     Rec:  Continue CCPD, increase to all 2.5%  PD fluid not c/w peritonitis (PD TNC 5)  Continue florinef and midodrine  Continue EPO 15,000 unit 3 times weekly       Subjective:   CC: ESRD  HPI: PD went well. BP stable. She is more edematous.  Current Facility-Administered Medications   Medication Dose Route Frequency    dianeal lo-aura 2.5% 6,000 mL  6,000 mL IntraPERitoneal Q24H    dianeal lo-aura 2.5% 6,000 mL  6,000 mL IntraPERitoneal Q24H    prochlorperazine (COMPAZINE) injection 10 mg  10 mg IntraVENous Q6H PRN    midodrine (PROAMATINE) tablet 15 mg  15 mg Oral TID    fludrocortisone (FLORINEF) tablet 0.1 mg  0.1 mg Oral BID    epoetin alba-epbx (RETACRIT) injection 15,000 Units  15,000 Units SubCUTAneous Once per day on Mon Wed Fri    dianeal lo-aura 2.5% 6,000 mL  6,000 mL IntraPERitoneal Q24H    diphenhydrAMINE (BENADRYL) capsule 25 mg  25 mg Oral Q6H PRN    pantoprazole (PROTONIX) tablet 40 mg  40 mg Oral BID AC    magnesium oxide (MAG-OX) tablet 400 mg  400 mg Oral BID    hydrocortisone sodium succinate PF (SOLU-CORTEF) injection 50 mg  50 mg IntraVENous Q8H    allopurinol (ZYLOPRIM) tablet 100 mg  100 mg Oral Daily    atorvastatin (LIPITOR) tablet 80 mg  80 mg Oral Nightly    erythromycin (EES) 200 MG/5ML suspension 200 mg  200 mg Oral 4x Daily PC & HS    folic acid (FOLVITE) tablet 1,000 mcg  1,000 mcg Oral Daily    [Held by provider] metoprolol succinate (TOPROL XL) extended release tablet 25 mg  25 mg Oral QHS    potassium bicarb-citric acid (EFFER-K) effervescent tablet 20 mEq  20 mEq Oral Daily    docusate sodium (COLACE) capsule 100 mg  100 mg Oral Daily    warfarin placeholder: dosing by pharmacy   Other RX Placeholder    senna (SENOKOT) tablet

## 2024-04-01 NOTE — PROGRESS NOTES
Affiliated with [facility]  [address] [id number]   Office [phone], Fax [phone]       Subjective / Objective     Subjective:  Overnight events: NAEON. Remained on CPAP.    Patient seen and examined at bedside. No acute complaints. Slept well overnight. Not symptomatic when blood pressure is low.     Vital Signs  Vitals:    04/01/24 0400   BP: (!) 114/59   Pulse: 89   Resp: 16   Temp: 98 °F (36.7 °C)   SpO2: 100%         Physical Exam  General: No acute distress. Alert. Cooperative.   Head: Normocephalic. Atraumatic.    ENT:             Moist mucous membranes. Sclera white. PERRL.   Respiratory: CTAB. No w/r/r. No accessory muscle use.   Cardiovascular: RRR. Normal S1,S2. No m/r/g.    GI: Abdomen soft, epigastric tender. No rebound tenderness. Nondistended.  PD catheter in place. No evidence of rash or infection on skin.    Extremities: Bilateral LE 2+ pitting edema, L arm > R arm, R leg > L leg.  Right LE has overlying chronic skin changes.    Skin:  Neuro: Warm. No rashes.  A&Ox4. No functional neurologic deficit noted.        I/O:    Intake/Output Summary (Last 24 hours) at 4/1/2024 0556  Last data filed at 3/31/2024 1930  Gross per 24 hour   Intake 43.03 ml   Output --   Net 43.03 ml          CBC:  Recent Labs     03/30/24  0216 03/31/24  0545 04/01/24  0420   WBC 15.0* 11.5* 12.8*   HGB 7.0* 7.0* 7.3*   HCT 21.8* 22.0* 22.7*    217 232         Metabolic Panel:  Recent Labs     03/30/24  0216 03/31/24  0545 04/01/24  0420   * 134* 133*   K 3.8 3.8 4.0   CL 99 99 97   CO2 26 29 30   BUN 19 24* 25*   MG 2.0 1.9 1.9   ALT 12 13 19   INR 2.8* 2.1* 2.1*           Imaging  CTA ABDOMEN PELVIS W CONTRAST  Narrative: EXAM: CTA ABDOMEN PELVIS W CONTRAST    INDICATION: concern for mesenteric ischemia, rising lactic acid, abdominal pain    COMPARISON: CT abdomen/pelvis 3/26/2024 and additional prior  mesenteric artery is patent, but calcified atherosclerotic disease results  in moderate stenosis at its ostium. The inferior mesenteric artery is patent  with probable stenosis at its ostium.  REPRODUCTIVE ORGANS: Hysterectomy.  URINARY BLADDER: No mass or calculus.  BONES: No destructive bone lesion.  ABDOMINAL WALL: There is right lateral abdominal wall and flank subcutaneous  edema and fat stranding. No focal fluid collection.  ADDITIONAL COMMENTS: N/A  Impression: 1.  Atherosclerotic disease results in moderate focal stenosis of the origin of  the SMA and VANDANA, but there are no signs of bowel ischemia.  2.  Unchanged right lateral abdominal wall and right flank subcutaneous edema  and fat stranding, which could reflect cellulitis or asymmetric anasarca.              Assessment and Plan     Elizabeth Hopkins is a 68 y.o. female with pmhx of ESRD, CAD, HTN, VTE, DM, ILD, pancreatitis who is admitted for hypotension and sepsis.     Shock Improving: off pressors. Suspected likely adrenal insufficiency or septic - potential source of PD site cellulitis - no evidence of skin discoloration on today's exam. Hypotension is acute on chronic, patient is on home midodrine 10mg TID. On admission meeting 3/4 SIRS criteria HR, RR, WBC 16.8. LA 2.9).  Patient admits to taking nitro prior to hypotension which prompted admission. CT mod ascites, right lateral abd wall cellulitis. Peritoneal fluid with WBC of 51 negative for SBP. CTA A/P on 3/29 showing no bowel ischemia, but unchanged subcutaneous abdominal wall edema/fat stranding concerning for cellulitis vs anasarca. S/p ceftriaxone (3/26) and vanc/flagyl/fluconazole (3/26-3/28). 1/4 Blood culture 1/4 positive for Staph Epi, likely contaminant. Paracentesis culture is negative. S/p levo gtt (3/26-3/29). S/p IV cefepime(3/26-3/31)   - Midodrine 15 mg TID  - Solu-Cortef 50 mg q 8h  - Florinef 0.1 mg BID  - Continue to monitor MAPs    Adrenal insufficiency: likely acute on

## 2024-04-01 NOTE — FLOWSHEET NOTE
CCPD Disconnection:     04/01/24 0753   Observations & Evaluations   Level of Consciousness 0   Heart Rhythm Regular   Respiratory Quality/Effort Unlabored   O2 Device Nasal cannula   Skin Condition/Temp Dry;Warm   Abdomen Inspection Obese;Soft   Edema Generalized   Peritoneal Dialysis Catheter Mid lower abdomen   No placement date or time found.   Present on Admission/Arrival: Yes  Catheter Location: Mid lower abdomen   Status Deaccessed   Site Condition Clean, dry, intact   Dressing Status Clean, dry & intact   Dressing Gauze   Date of Last Dressing Change 03/31/24   Dialysis Type Continuous cycling   Catheter Care Given Yes   Post-Treatment (Cycler)   Average Dwell Time (Hours:Minutes) 1:11   Lost Dwell Time (Hours:Minutes)   (Added Dwell: 0:07)   Effluent Appearance Clear;Yellow   PD Output (mL) 399 mL  (Initial Drain (96 ml) + Total UF (303 ml) - Last Fill (0 ml) = 399 ml)     Primary RN SBAR: NATASHA Faye, RN  Comments: Prior to disconnection one minute Alcavis scrub & soak performed. Sterile mini cap applied & transfer set secured to pt abdomen with tape. Old cassette & bags discarded in red biohazard bag.

## 2024-04-01 NOTE — PROGRESS NOTES
[facility] Dosing Services: 04/01/24  Consult for Warfarin Dosing by Pharmacy by Dr. Marti  Consult provided for this 69 yo female for indication of Hx of VTE  Day of Therapy: resumed from home (PTA: Warfarin 2.5 mg on Wed/Fri and 1 mg on Mon/Tue/Thur/Sat/Sun)  Dose to achieve an INR goal of 2-3    Order entered for Warfarin  1 mg ordered to be given today at 18:00.     Significant drug interactions: PD, Abx  Previous dose    PT/INR Lab Results   Component Value Date/Time    INR 2.1 04/01/2024 04:20 AM    INR 1.8 05/03/2023 02:34 PM    INREXT 4.1 05/20/2021 12:00 AM      Platelets Lab Results   Component Value Date/Time     04/01/2024 04:20 AM      H/H Lab Results   Component Value Date/Time    HGB 7.3 04/01/2024 04:20 AM        Pharmacy to follow daily and will provide subsequent Warfarin dosing based on clinical status.  CLAUDINE DUGAN, [facility])   Contact information [phone]

## 2024-04-02 ENCOUNTER — CLINICAL DOCUMENTATION (OUTPATIENT)
Age: 69
End: 2024-04-02

## 2024-04-02 VITALS
TEMPERATURE: 97.7 F | HEART RATE: 95 BPM | RESPIRATION RATE: 18 BRPM | SYSTOLIC BLOOD PRESSURE: 148 MMHG | BODY MASS INDEX: 41.95 KG/M2 | HEIGHT: 70 IN | WEIGHT: 293 LBS | OXYGEN SATURATION: 100 % | DIASTOLIC BLOOD PRESSURE: 65 MMHG

## 2024-04-02 LAB
ALBUMIN SERPL-MCNC: 2 G/DL (ref 3.5–5)
ALBUMIN/GLOB SERPL: 0.7 (ref 1.1–2.2)
ALP SERPL-CCNC: 132 U/L (ref 45–117)
ALT SERPL-CCNC: 18 U/L (ref 12–78)
ANION GAP SERPL CALC-SCNC: 9 MMOL/L (ref 5–15)
AST SERPL-CCNC: 19 U/L (ref 15–37)
BASOPHILS # BLD: 0 K/UL (ref 0–0.1)
BASOPHILS NFR BLD: 0 % (ref 0–1)
BILIRUB SERPL-MCNC: 0.4 MG/DL (ref 0.2–1)
BUN SERPL-MCNC: 30 MG/DL (ref 6–20)
BUN/CREAT SERPL: 5 (ref 12–20)
CALCIUM SERPL-MCNC: 8.6 MG/DL (ref 8.5–10.1)
CHLORIDE SERPL-SCNC: 98 MMOL/L (ref 97–108)
CO2 SERPL-SCNC: 27 MMOL/L (ref 21–32)
CREAT SERPL-MCNC: 6.14 MG/DL (ref 0.55–1.02)
DIFFERENTIAL METHOD BLD: ABNORMAL
EOSINOPHIL # BLD: 0 K/UL (ref 0–0.4)
EOSINOPHIL NFR BLD: 0 % (ref 0–7)
ERYTHROCYTE [DISTWIDTH] IN BLOOD BY AUTOMATED COUNT: 18.9 % (ref 11.5–14.5)
GLOBULIN SER CALC-MCNC: 2.9 G/DL (ref 2–4)
GLUCOSE BLD STRIP.AUTO-MCNC: 178 MG/DL (ref 65–117)
GLUCOSE BLD STRIP.AUTO-MCNC: 233 MG/DL (ref 65–117)
GLUCOSE BLD STRIP.AUTO-MCNC: 237 MG/DL (ref 65–117)
GLUCOSE SERPL-MCNC: 268 MG/DL (ref 65–100)
HCT VFR BLD AUTO: 22.6 % (ref 35–47)
HGB BLD-MCNC: 7.3 G/DL (ref 11.5–16)
IMM GRANULOCYTES # BLD AUTO: 0 K/UL
IMM GRANULOCYTES NFR BLD AUTO: 0 %
INR PPP: 2.2 (ref 0.9–1.1)
LYMPHOCYTES # BLD: 2 K/UL (ref 0.8–3.5)
LYMPHOCYTES NFR BLD: 15 % (ref 12–49)
MAGNESIUM SERPL-MCNC: 1.9 MG/DL (ref 1.6–2.4)
MCH RBC QN AUTO: 34 PG (ref 26–34)
MCHC RBC AUTO-ENTMCNC: 32.3 G/DL (ref 30–36.5)
MCV RBC AUTO: 105.1 FL (ref 80–99)
MONOCYTES # BLD: 0.5 K/UL (ref 0–1)
MONOCYTES NFR BLD: 4 % (ref 5–13)
MYELOCYTES NFR BLD MANUAL: 1 %
NEUTS BAND NFR BLD MANUAL: 1 % (ref 0–6)
NEUTS SEG # BLD: 10.8 K/UL (ref 1.8–8)
NEUTS SEG NFR BLD: 79 % (ref 32–75)
NRBC # BLD: 0.41 K/UL (ref 0–0.01)
NRBC BLD-RTO: 3 PER 100 WBC
PLATELET # BLD AUTO: 245 K/UL (ref 150–400)
PMV BLD AUTO: 10.7 FL (ref 8.9–12.9)
POTASSIUM SERPL-SCNC: 3.7 MMOL/L (ref 3.5–5.1)
PROT SERPL-MCNC: 4.9 G/DL (ref 6.4–8.2)
PROTHROMBIN TIME: 21.6 SEC (ref 9–11.1)
RBC # BLD AUTO: 2.15 M/UL (ref 3.8–5.2)
RBC MORPH BLD: ABNORMAL
RBC MORPH BLD: ABNORMAL
SERVICE CMNT-IMP: ABNORMAL
SODIUM SERPL-SCNC: 134 MMOL/L (ref 136–145)
WBC # BLD AUTO: 13.5 K/UL (ref 3.6–11)

## 2024-04-02 PROCEDURE — 6370000000 HC RX 637 (ALT 250 FOR IP): Performed by: INTERNAL MEDICINE

## 2024-04-02 PROCEDURE — 6370000000 HC RX 637 (ALT 250 FOR IP)

## 2024-04-02 PROCEDURE — 36415 COLL VENOUS BLD VENIPUNCTURE: CPT

## 2024-04-02 PROCEDURE — 99238 HOSP IP/OBS DSCHRG MGMT 30/<: CPT | Performed by: FAMILY MEDICINE

## 2024-04-02 PROCEDURE — 2700000000 HC OXYGEN THERAPY PER DAY

## 2024-04-02 PROCEDURE — 97530 THERAPEUTIC ACTIVITIES: CPT

## 2024-04-02 PROCEDURE — 85610 PROTHROMBIN TIME: CPT

## 2024-04-02 PROCEDURE — 85025 COMPLETE CBC W/AUTO DIFF WBC: CPT

## 2024-04-02 PROCEDURE — 83735 ASSAY OF MAGNESIUM: CPT

## 2024-04-02 PROCEDURE — 6360000002 HC RX W HCPCS

## 2024-04-02 PROCEDURE — 94660 CPAP INITIATION&MGMT: CPT

## 2024-04-02 PROCEDURE — 97110 THERAPEUTIC EXERCISES: CPT

## 2024-04-02 PROCEDURE — 82962 GLUCOSE BLOOD TEST: CPT

## 2024-04-02 PROCEDURE — 80053 COMPREHEN METABOLIC PANEL: CPT

## 2024-04-02 PROCEDURE — 97116 GAIT TRAINING THERAPY: CPT

## 2024-04-02 PROCEDURE — 94761 N-INVAS EAR/PLS OXIMETRY MLT: CPT

## 2024-04-02 PROCEDURE — 2580000003 HC RX 258

## 2024-04-02 RX ORDER — INSULIN LISPRO 100 [IU]/ML
0-4 INJECTION, SOLUTION INTRAVENOUS; SUBCUTANEOUS
Status: DISCONTINUED | OUTPATIENT
Start: 2024-04-02 | End: 2024-04-02 | Stop reason: HOSPADM

## 2024-04-02 RX ORDER — DEXTROSE MONOHYDRATE 100 MG/ML
INJECTION, SOLUTION INTRAVENOUS CONTINUOUS PRN
Status: DISCONTINUED | OUTPATIENT
Start: 2024-04-02 | End: 2024-04-02 | Stop reason: HOSPADM

## 2024-04-02 RX ORDER — INSULIN LISPRO 100 [IU]/ML
0-4 INJECTION, SOLUTION INTRAVENOUS; SUBCUTANEOUS NIGHTLY
Status: DISCONTINUED | OUTPATIENT
Start: 2024-04-02 | End: 2024-04-02 | Stop reason: HOSPADM

## 2024-04-02 RX ORDER — SODIUM BICARBONATE 650 MG/1
325 TABLET ORAL ONCE
Status: COMPLETED | OUTPATIENT
Start: 2024-04-02 | End: 2024-04-02

## 2024-04-02 RX ORDER — WARFARIN SODIUM 1 MG/1
1 TABLET ORAL
Status: DISCONTINUED | OUTPATIENT
Start: 2024-04-02 | End: 2024-04-02 | Stop reason: HOSPADM

## 2024-04-02 RX ORDER — MIDODRINE HYDROCHLORIDE 5 MG/1
15 TABLET ORAL 3 TIMES DAILY
Qty: 90 TABLET | Refills: 3 | Status: SHIPPED | OUTPATIENT
Start: 2024-04-02

## 2024-04-02 RX ORDER — CALCIUM CARBONATE 500 MG/1
500 TABLET, CHEWABLE ORAL 3 TIMES DAILY PRN
Status: DISCONTINUED | OUTPATIENT
Start: 2024-04-02 | End: 2024-04-02 | Stop reason: HOSPADM

## 2024-04-02 RX ORDER — SODIUM BICARBONATE 650 MG/1
325 TABLET ORAL ONCE
Status: CANCELLED | OUTPATIENT
Start: 2024-04-02

## 2024-04-02 RX ORDER — FLUDROCORTISONE ACETATE 0.1 MG/1
0.1 TABLET ORAL 2 TIMES DAILY
Qty: 60 TABLET | Refills: 0 | Status: SHIPPED | OUTPATIENT
Start: 2024-04-02 | End: 2024-05-02

## 2024-04-02 RX ADMIN — HYDROCORTISONE SODIUM SUCCINATE 50 MG: 100 INJECTION, POWDER, FOR SOLUTION INTRAMUSCULAR; INTRAVENOUS at 08:56

## 2024-04-02 RX ADMIN — SODIUM BICARBONATE 325 MG: 650 TABLET ORAL at 08:57

## 2024-04-02 RX ADMIN — INSULIN LISPRO 1 UNITS: 100 INJECTION, SOLUTION INTRAVENOUS; SUBCUTANEOUS at 15:45

## 2024-04-02 RX ADMIN — Medication 400 MG: at 08:57

## 2024-04-02 RX ADMIN — POTASSIUM BICARBONATE 20 MEQ: 782 TABLET, EFFERVESCENT ORAL at 11:11

## 2024-04-02 RX ADMIN — SODIUM CHLORIDE, PRESERVATIVE FREE 10 ML: 5 INJECTION INTRAVENOUS at 09:00

## 2024-04-02 RX ADMIN — PANTOPRAZOLE SODIUM 40 MG: 40 TABLET, DELAYED RELEASE ORAL at 15:05

## 2024-04-02 RX ADMIN — POLYETHYLENE GLYCOL 3350 17 G: 17 POWDER, FOR SOLUTION ORAL at 08:56

## 2024-04-02 RX ADMIN — PANTOPRAZOLE SODIUM 40 MG: 40 TABLET, DELAYED RELEASE ORAL at 06:14

## 2024-04-02 RX ADMIN — ALLOPURINOL 100 MG: 100 TABLET ORAL at 08:57

## 2024-04-02 RX ADMIN — INSULIN LISPRO 1 UNITS: 100 INJECTION, SOLUTION INTRAVENOUS; SUBCUTANEOUS at 08:57

## 2024-04-02 RX ADMIN — FOLIC ACID 1000 MCG: 1 TABLET ORAL at 08:57

## 2024-04-02 RX ADMIN — MIDODRINE HYDROCHLORIDE 15 MG: 5 TABLET ORAL at 08:57

## 2024-04-02 RX ADMIN — FLUDROCORTISONE ACETATE 0.1 MG: 0.1 TABLET ORAL at 08:57

## 2024-04-02 NOTE — PROGRESS NOTES
[facility]    Elizabeth Hopkins  YOB: 1955          Assessment & Plan:     ESRD on CCPD ([place])  Hypotension  Cirrhosis  ILD  CAD  Anemia of chronic kidney disease  Lactic acidosis     Rec:  Continue CCPD, all 2.5%. Seh does not want to use 4.25 yet  PD fluid not c/w peritonitis (PD TNC 5)  Continue florinef and midodrine  Continue EPO 15,000 unit 3 times weekly       Subjective:   CC: ESRD  HPI: BP more stable. UF about 1 L with 2.5%. C/o edema of UE.  Current Facility-Administered Medications   Medication Dose Route Frequency    calcium carbonate (TUMS) chewable tablet 500 mg  500 mg Oral TID PRN    glucose chewable tablet 16 g  4 tablet Oral PRN    dextrose bolus 10% 125 mL  125 mL IntraVENous PRN    Or    dextrose bolus 10% 250 mL  250 mL IntraVENous PRN    glucagon injection 1 mg  1 mg SubCUTAneous PRN    dextrose 10 % infusion   IntraVENous Continuous PRN    insulin lispro (HUMALOG) injection vial 0-4 Units  0-4 Units SubCUTAneous TID WC    insulin lispro (HUMALOG) injection vial 0-4 Units  0-4 Units SubCUTAneous Nightly    warfarin (COUMADIN) tablet 1 mg  1 mg Oral Once    dianeal lo-aura 2.5% 6,000 mL  6,000 mL IntraPERitoneal Q24H    dianeal lo-aura 2.5% 6,000 mL  6,000 mL IntraPERitoneal Q24H    hydrocortisone sodium succinate PF (SOLU-CORTEF) injection 50 mg  50 mg IntraVENous Q12H    prochlorperazine (COMPAZINE) injection 10 mg  10 mg IntraVENous Q6H PRN    midodrine (PROAMATINE) tablet 15 mg  15 mg Oral TID    fludrocortisone (FLORINEF) tablet 0.1 mg  0.1 mg Oral BID    epoetin alba-epbx (RETACRIT) injection 15,000 Units  15,000 Units SubCUTAneous Once per day on Mon Wed Fri    dianeal lo-aura 2.5% 6,000 mL  6,000 mL IntraPERitoneal Q24H    diphenhydrAMINE (BENADRYL) capsule 25 mg  25 mg Oral Q6H PRN    pantoprazole (PROTONIX) tablet 40 mg  40 mg Oral BID AC    magnesium oxide (MAG-OX) tablet 400 mg  400 mg Oral BID    allopurinol    ECG/rhythm:    Data Review        Recent Labs     03/31/24  0545 04/01/24  0420 04/02/24  0032   * 133* 134*   K 3.8 4.0 3.7   CL 99 97 98   CO2 29 30 27   BUN 24* 25* 30*   CREATININE 5.71* 5.78* 6.14*   GLUCOSE 208* 173* 268*   CALCIUM 8.6 8.8 8.6           : Tereas Fuentes MD  4/2/2024        [facility]:  [email or web address]  [email or web address]    [place] office:  [address]  [place] [address]  Phone: [phone]  Fax :     [phone]    [place] office:  [address] [place] [address]  Phone - [phone]  Fax - [phone]

## 2024-04-02 NOTE — CARE COORDINATION
Transition of Care Plan - RUR 24%:  Await medical stability and discharge order  Home Health has been arranged with Jf  Patient's  will transport and will bring portable O2  Outpatient follow-up

## 2024-04-02 NOTE — PROGRESS NOTES
[facility] Dosing Services: 04/02/24  Consult for Warfarin Dosing by Pharmacy by Dr. Marti  Consult provided for this 69 yo female for indication of Hx of VTE  Day of Therapy: resumed from home (PTA: Warfarin 2.5 mg on Wed/Fri and 1 mg on Mon/Tue/Thur/Sat/Sun)  Dose to achieve an INR goal of 2-3    Order entered for Warfarin 1 mg ordered to be given today at 18:00      Significant drug interactions: PD, Allopurinol, Fludrocortisone, Hydrocortisone  Previous dose  Administered 1 mg warfarin 4/1/24 @ 1715       PT/INR Lab Results   Component Value Date/Time    INR 2.2 04/02/2024 12:32 AM    INR 1.8 05/03/2023 02:34 PM    INREXT 4.1 05/20/2021 12:00 AM      Platelets Lab Results   Component Value Date/Time     04/02/2024 12:32 AM      H/H Lab Results   Component Value Date/Time    HGB 7.3 04/02/2024 12:32 AM        Pharmacy to follow daily and will provide subsequent Warfarin dosing based on clinical status.  Karel Ibrahim [facility])   Contact information [phone]

## 2024-04-02 NOTE — PROGRESS NOTES
Affiliated with [facility]  [address] [id number]   Office [phone], Fax [phone]       Subjective / Objective     Subjective:  Overnight events: NAEON. Remained on CPAP.    Patient seen and examined at bedside. No acute complaints, would like sodium bicarbonate for GERD symptoms. Slept well overnight.     Vital Signs  Vitals:    04/02/24 0320   BP:    Pulse: 88   Resp: 18   Temp:    SpO2: 100%         Physical Exam  General: No acute distress. Alert. Cooperative.   Head: Normocephalic. Atraumatic.    ENT:             Moist mucous membranes. Sclera white. PERRL.   Respiratory: CTAB. No w/r/r. No accessory muscle use.   Cardiovascular: RRR. Normal S1,S2. No m/r/g.    GI: Abdomen soft, epigastric tender. No rebound tenderness. Nondistended.  PD catheter in place. No evidence of rash or infection on skin.    Extremities: Bilateral LE 2+ pitting edema, L arm > R arm, R leg > L leg.  Right LE has overlying chronic skin changes.    Skin:  Neuro: Warm. No rashes.  A&Ox4. No functional neurologic deficit noted.        I/O:    Intake/Output Summary (Last 24 hours) at 4/2/2024 0624  Last data filed at 4/1/2024 1835  Gross per 24 hour   Intake 100 ml   Output 399 ml   Net -299 ml          CBC:  Recent Labs     03/31/24  0545 04/01/24  0420 04/02/24  0032   WBC 11.5* 12.8* 13.5*   HGB 7.0* 7.3* 7.3*   HCT 22.0* 22.7* 22.6*    232 245         Metabolic Panel:  Recent Labs     03/31/24  0545 04/01/24  0420 04/02/24  0032   * 133* 134*   K 3.8 4.0 3.7   CL 99 97 98   CO2 29 30 27   BUN 24* 25* 30*   MG 1.9 1.9 1.9   ALT 13 19 18   INR 2.1* 2.1* 2.2*           Imaging  CTA ABDOMEN PELVIS W CONTRAST  Narrative: EXAM: CTA ABDOMEN PELVIS W CONTRAST    INDICATION: concern for mesenteric ischemia, rising lactic acid, abdominal pain    COMPARISON: CT abdomen/pelvis 3/26/2024 and additional prior exams.    IV CONTRAST:

## 2024-04-02 NOTE — PLAN OF CARE
Problem: Physical Therapy - Adult  Goal: By Discharge: Performs mobility at highest level of function for planned discharge setting.  See evaluation for individualized goals.  Description: FUNCTIONAL STATUS PRIOR TO ADMISSION: Reports limited mobility following a right TKA 8/2023 exacerbated following a recent 3 week hospital stay. She returned home approximately 1 week ago and has been primarily using her wheelchair. She reports transferring with assist x2 from her  and son. Recently walking short distances using a RW with HHPT.    HOME SUPPORT PRIOR TO ADMISSION: The patient lived with her  and adult son who assisted with transfers to her wheelchair and tub.    Physical Therapy Goals  Initiated 3/28/2024  1.  Patient will move from supine to sit and sit to supine in bed with modified independence within 7 day(s).    2.  Patient will perform sit to stand with modified independence within 7 day(s).  3.  Patient will transfer from bed to chair and chair to bed with supervision/set-up using the least restrictive device within 7 day(s).  4.  Patient will ambulate with supervision/set-up for 15 feet with the least restrictive device within 7 day(s).     Outcome: Progressing     PHYSICAL THERAPY TREATMENT    Patient: Elizabeth Hopkins (68 y.o. female)  Date: 4/2/2024  Diagnosis: Hypotension [I95.9]  Hypotension, unspecified hypotension type [I95.9]  Chronic kidney disease, unspecified CKD stage [N18.9] Hypotension      Precautions:  (falls)                      ASSESSMENT:  Pt continues to progress toward PT POC goals. Progress to date less than anticipated per underlying medical status and ~ self limiting behavior. Pt admits to having a particular preference, manner, time of day to perform tasks. \"I get up into the chair around noon and sit until 6PM\". Demo and vcs provided for BLE therex to perform while in bed or chair. Pt initially indicated inability to bend or move legs, then demo leg walk  acceptable to the patient    Activity Tolerance:   Good    After treatment:   Patient left in no apparent distress sitting up in chair, Call bell within reach, Bed/ chair alarm activated, Caregiver / family present, and spouse present.      COMMUNICATION/EDUCATION:   The patient's plan of care was discussed with: occupational therapist, registered nurse, and MD Team.    Patient Education  Education Given To: Patient  Education Provided: Role of Therapy;Transfer Training;Equipment;Plan of Care;Energy Conservation;Fall Prevention Strategies;Orientation;Precautions;Family Education  Education Method: Demonstration;Verbal  Barriers to Learning: None  Education Outcome: Verbalized understanding;Demonstrated understanding;Continued education needed      Cathy Manuel, PT  Minutes: 27

## 2024-04-02 NOTE — FLOWSHEET NOTE
CCPD Disconnection Note:     04/02/24 0735   Observations & Evaluations   Level of Consciousness 0   Heart Rhythm Regular   Respiratory Quality/Effort Unlabored   O2 Device Nasal cannula   Skin Condition/Temp Dry;Warm   Abdomen Inspection Obese;Soft   Edema Generalized   Peritoneal Dialysis Catheter Mid lower abdomen   No placement date or time found.   Present on Admission/Arrival: Yes  Catheter Location: Mid lower abdomen   Status Deaccessed   Site Condition Clean, dry, intact   Dressing Status Clean, dry & intact   Dressing Gauze   Date of Last Dressing Change 04/01/24   Dialysis Type Continuous cycling   Catheter Care Given Yes   Post-Treatment (Cycler)   Average Dwell Time (Hours:Minutes) 1:10   Lost Dwell Time (Hours:Minutes) 0:01   Effluent Appearance Clear;Yellow   PD Output (mL) 993 mL  (Initial Drain (74 ml) + Total UF (919 ml) - Last Fill (0 ml) = 993 ml)     Primary RN SBAR: ARISTIDES Gaston RN  Comments: Prior to disconnection one minute Alcavis scrub & soak performed. Sterile mini cap applied & transfer set secured to pt abdomen with tape. Old cassette & bags discarded in red biohazard bag.

## 2024-04-02 NOTE — PROGRESS NOTES
To assist primary nurse with discharge, I have completed the AVS Med-updates and added information on the new medication. Care Plans and Education were resolved and the AVS has been printed and left on the chart. Primary nurse updated

## 2024-04-02 NOTE — FLOWSHEET NOTE
04/01/24 2030   Vitals   BP (!) 146/63   Temp 98 °F (36.7 °C)   Temp Source Oral   Pulse 96   Respirations 18   SpO2 100 %   Observations & Evaluations   Level of Consciousness 0   Oriented X 4   Heart Rhythm Regular   Respiratory Quality/Effort Unlabored   O2 Device Nasal cannula   Bilateral Breath Sounds Clear   Skin Color Other (comment)  (WDL)   Skin Condition/Temp Warm;Dry;Swollen/edematous   Appetite Good   Abdomen Inspection Obese   Edema Generalized   RUE Edema +2   LUE Edema +3   RLE Edema +3   LLE Edema +3   Peritoneal Dialysis Catheter Mid lower abdomen   No placement date or time found.   Present on Admission/Arrival: Yes  Catheter Location: Mid lower abdomen   Status Accessed   Site Condition Clean, dry, intact   Dressing Status New dressing applied;Clean, dry & intact   Dressing Gauze   Date of Last Dressing Change 04/01/24   Dialysis Type Continuous cycling   Exit Site Condition good   Catheter Care Given Yes  (2 minute Alcavis scrub/soak)   Cycler   Verification of Prescription CCPD   Informed Consent    (chronic consent applies)   Total Volume Programmed 95114 mL   Therapy Time (Hours:Minutes) 9:00   Cycler Type Yen HomeChoice   Fill Volume 3000 mL   Last Fill Volume 0 mL   Dextrose Setting Same (Nonextraneal)   I Drain Alarm 0 mL   Number of Cycles 5   Bag Volume 6000 mL   Number of Bags Used 3   Dianeal Solution   (2.5% dextrose in 6000mL x 3)       Technical Checks   All Connections Secure Yes   ICEBOAT I;C;E;B;O;A;T     Primary RN SBAR: ARISTIDES Gaston RN  Patient Education: CCPD treatment process  Hospital associated wait time; reason: 0  Hepatitis B Surface Ag   Date/Time Value Ref Range Status   03/28/2024 05:23 AM <0.10 Index Final     Hep B S Ab   Date/Time Value Ref Range Status   03/28/2024 05:23 AM <3.10 mIU/mL Final      Pt orders, notes, labs, code status reviewed. Time out completed. CCPD initiated per order. Remained present during initial drain and initiation of first fill.     Upon

## 2024-04-02 NOTE — DISCHARGE SUMMARY
[address] [place] [address]   Office [phone]  Fax [phone]       Discharge / Transfer / Off-Service Note     Name: Elizabeth Hopkins MRN: [id number]  Sex: Female   YOB: 1955  Age: 68 y.o.  PCP: Nadege Perez DO     Date of admission: 3/26/2024  Date of discharge/transfer: 4/2/2024    Attending physician at admission: Renzo Garland MD.  Attending physician at discharge/transfer: Dario Teixeira MD.  Resident physician at discharge/transfer: Zara Fritz MD     Consultants during hospitalization  IP CONSULT TO NEPHROLOGY  IP CONSULT TO INTENSIVIST  IP CONSULT TO RESPIRATORY CARE  IP WOUND CARE NURSE CONSULT TO EVAL  IP CONSULT TO PHARMACY  IP CONSULT TO PHARMACY  IP WOUND CARE NURSE CONSULT TO EVAL  IP CONSULT TO INTERVENTIONAL RADIOLOGY  IP CONSULT TO INTERVENTIONAL RADIOLOGY     Admission diagnoses   Hypotension [I95.9]  Hypotension, unspecified hypotension type [I95.9]  Chronic kidney disease, unspecified CKD stage [N18.9]    Recommended follow-up after discharge    1. PCP-Nadege Perez DO  2. Cardiology  3. Nephrology  4. Hepatology      To follow up on with PCP:   - consider restarting metoprolol  - repeat CBC to ensure resolution of leukocytosis    To follow up on with cardiology:   - consider alternatives to nitroglycerin to avoid hypotension  - HTN management    To follow up on with Nephrology:   - ERSD    To follow up on with Hepatology:   - new diagnosis of cirrhosis    ------------------------------------------------------------------------------------------------------------------    History of Present Illness    As per admitting provider, Dr. Marti:   \"Elizabeth Hopkins is a 68 y.o. female with pmhx SRD, CAD, HTN, VTE, DM, ILD, pancreatitis  who presents to the ER complaining of hypotension. Home health nurse advised ED visit due to persistent symptomatic hypotension despite midodrine. She is c/o dizziness, lightheadedness, abdominal pain for  KLOR-CON M            STOP taking these medications      dexlansoprazole 60 MG Cpdr delayed release capsule  Commonly known as: Dexilant     erythromycin 250 MG Cpep extended release capsule  Commonly known as: JEFF-CAP     metoprolol succinate 50 MG extended release tablet  Commonly known as: TOPROL XL               Where to Get Your Medications        These medications were sent to [id number] - [place], [place] - [address] - P [phone] - F [phone] [address] [facility] [address]      Hours: 24-hours Phone: [phone]   fludrocortisone 0.1 MG tablet  midodrine 5 MG tablet          Diet:  Diabetic diet.    Activity:  As tolerated     Disposition: Home Health     Discharge instructions to patient/family  Please seek medical attention for any new or worsening symptoms particularly chest pain, shortness of breath, fever, chills, nausea, vomiting, diarrhea, change in mentation, falling, weakness, bleeding.    Follow up plans/appointments  Follow-up Information       Follow up With Specialties Details Why Contact Info    Nadege Perez DO Family Medicine Go on 4/17/2024 at 1:15pm [address]  [facility] [address] [phone]      Noni Chaparro APRN - NP Nurse Practitioner, Hepatology Go on 8/12/2024 at 10:00 AM [address]  [facility] [address] [phone]      [facility]  Follow up  [phone]             Zara Fritz MD  Family Medicine Resident     For Billing    Chief Complaint   Patient presents with   • Hypotension

## 2024-04-02 NOTE — PROGRESS NOTES
Physical therapy services attempted @9:20AM. Reporting DPT stopped by room to introduce self/role. Pt indicated that \"she has a schedule and prefers to get OOB a certain time everyday and usually around noon\". Compassionate listening and patient education that therapy team(s) work t/o hospital, schedule changes t/o day. Notwithstanding, therapist would attempt to return later around requested time to provide therapy services. Pt encouraged to continue OOB/short distance amb to chair with staff in interim. Pt thanked therapist for stopping by and confirmed readiness for therapy ~ noon. PT Team will try later as time permits and appropriate to patient tolerance and/or willingness to participate.    RN Team aware.  Cathy Manuel, PT, DPT

## 2024-04-02 NOTE — PLAN OF CARE
SUBJECTIVE:   Patient stated “I have to do it a certain way that works for me. I need you to grab under my arm.”    OBJECTIVE DATA SUMMARY:   Cognitive/Behavioral Status:  Orientation  Overall Orientation Status: Within Normal Limits  Orientation Level: Oriented X4  Cognition  Overall Cognitive Status: WNL  Cognition Comment: resistant to recommendations; particular with set-up and able to self-direct technique that works best for pt; decreased awareness of need for safety - refused gait belt use    Functional Mobility and Transfers for ADLs:  Bed Mobility:  Bed Mobility Training  Bed Mobility Training: Yes  Overall Level of Assistance: Contact-guard assistance (tactile/vcs for limb walk assist)  Interventions: Safety awareness training;Verbal cues  Supine to Sit: Contact-guard assistance  Sit to Supine: Moderate assistance (to assist bilat LE back into bed)     Transfers:   Transfer Training  Transfer Training: Yes  Overall Level of Assistance: Contact-guard assistance;Minimum assistance (Fatuma from lower position without bed rise)  Interventions: Demonstration;Manual cues;Safety awareness training;Tactile cues;Verbal cues  Sit to Stand: Minimum assistance;Assist X2 (pt with very particular placement; refused gait belt use)  Stand to Sit: Contact-guard assistance  Stand Pivot Transfers: Contact-guard assistance;Minimum assistance  Bed to Chair: Contact-guard assistance         Balance:  Standing: Impaired  Balance  Sitting: Intact  Sitting - Static: Good (unsupported)  Sitting - Dynamic:  (good minus)  Standing: Impaired  Standing - Static: Good  Standing - Dynamic: Fair    ADL Intervention:    Skin Care: Bath wipes;Chlorhexidine wipes;Lotion;Moisture barrier    Pain Rating:  Pt denied pain   Pain Intervention(s):   repositioning    Activity Tolerance:   Fair , requires rest breaks, and desaturates with activity and requires oxygen  Please refer to the flowsheet for vital signs taken during this  treatment.    After treatment:   Patient left in no apparent distress in bed, Call bell within reach, Caregiver / family present, and Side rails x3    COMMUNICATION/EDUCATION:   The patient's plan of care was discussed with: physical therapist and registered nurse    Thank you for this referral.  Awilda Bundy OT  Minutes: 13

## 2024-04-02 NOTE — DISCHARGE INSTRUCTIONS
HOME DISCHARGE INSTRUCTIONS    Elizabeth Hopikns / [id number] : 1955    Admission date: 3/26/2024 Discharge date: 2024     Please bring this form with you to show your care provider at your follow-up appointment.    Primary care provider:  PCP    Discharging provider:  Zara Fritz MD  - Family Medicine Resident  Dario Teixeira MD - Attending     You have been admitted to the hospital with the following diagnoses:    ACUTE DIAGNOSES:  Hypotension [I95.9]  Hypotension, unspecified hypotension type [I95.9]  Chronic kidney disease, unspecified CKD stage [N18.9]      . . . . . . . . . . . . . . . . . . . . . . . . . . . . . . . . . . . . . . . . . . . . . . . . . . . . . . . . . . . . . . . . . . . . . . . .   You were hospitalized for very low blood pressures which required medication. Your blood pressures are now stable. We recommend holding you metoprolol until you see your PCP to assess your blood pressures. We recommend to limit nitroglycerin use due to nitroglycerin causing low blood pressures. We recommend following up with your cardiologist to discuss alternatives to nitroglycerin that does not lower blood pressure.   . . . . . . . . . . . . . . . . . . . . . . . . . . . . . . . . . . . . . . . . . . . . . . . . . . . . . . . . . . . . . . . . . . . . . . . .     MEDICATION CHANGES  START per Nephrology  Florinef 0.1mg twice daily    CHANGE  Midodrine 15mg three times daily    STOP  Erythromycin due to intolerance  Metoprolol until you see your PCP      No other changes were made to your medications, please take all your other home medicines as previously prescribed.    Appointments  Future Appointments   Date Time Provider Department Center   2024 10:00 AM Noni Chaparro APRN - [place]  [address] [address] [address] [phone]    Schedule an appointment as soon as possible for a visit            Please follow up with your PCP

## 2024-04-03 ENCOUNTER — CLINICAL DOCUMENTATION (OUTPATIENT)
Age: 69
End: 2024-04-03

## 2024-04-05 ENCOUNTER — TELEPHONE (OUTPATIENT)
Age: 69
End: 2024-04-05

## 2024-04-05 NOTE — TELEPHONE ENCOUNTER
Patient is insisting she be given Medication for her sugar being high. She would like to speak with nurse concerning this. She does not want an appointment but speak directly with nurse. Patient's phone: [phone]

## 2024-04-07 NOTE — PROGRESS NOTES
Physician Progress Note      PATIENT:               GUSTAVO BOB  [facility] #:                  [id number]  :                       1955  ADMIT DATE:       3/26/2024 4:54 PM  DISCH DATE:        2024 4:25 PM  RESPONDING  PROVIDER #:        RITESH MACKAY          QUERY TEXT:    Good morning.    Pt was admitted from 24-24 with sepsis and cellulitis of abdominal   wall.  Pt noted to have peritoneal dialysis catheter.    After study, can the sepsis be further specified as:    The medical record reflects the following:    Risk Factors: 68 yr old female, ESRD--on PD, CAD, HTN, VTE, ILD, pancreatitis    Clinical Indicators:  CT abdomen/pelvis: Moderate ascites with peritoneal   dialysis catheter in place likely reflecting  dialysate.  Right lateral abdominal wall subcutaneous fat stranding without   abscess or mass likely reflect cellulitis;  CTA abd/pelvis:    Atherosclerotic disease results in moderate focal stenosis of the origin of   the SMA and VANDANA, but there are no signs of bowel ischemia. Unchanged right   lateral abdominal wall and right flank subcutaneous edema and fat stranding,   which could reflect cellulitis or asymmetric anasarca;  body fluid   culture: Gram stain-occasional WBCs, Gram Stain-no organisms seen, culture-no   growth at 4 days    Treatment: CT abd/pelvis, CTA Abd/pelvis, IV Cefepime, IV Vancomycin, IV   Flagyl, IV Fluconazole      Thank you,  Alessandra Kelsey RN, CDI  Options provided:  -- Sepsis related to the peritoneal dialysis catheter  -- Sepsis unrelated to the peritoneal dialysis catheter  -- Other - I will add my own diagnosis  -- Disagree - Not applicable / Not valid  -- Disagree - Clinically unable to determine / Unknown  -- Refer to Clinical Documentation Reviewer    PROVIDER RESPONSE TEXT:    Likely distributive shock 2/2 adrenal insufficiency    Query created by: Alessandra Kelsey on 2024 5:36 AM      Electronically signed by:  RITESH MACKAY

## 2024-04-09 ENCOUNTER — TELEPHONE (OUTPATIENT)
Age: 69
End: 2024-04-09

## 2024-04-09 ENCOUNTER — CLINICAL DOCUMENTATION (OUTPATIENT)
Age: 69
End: 2024-04-09

## 2024-04-09 DIAGNOSIS — E11.59 DM TYPE 2 CAUSING VASCULAR DISEASE (HCC): Primary | ICD-10-CM

## 2024-04-09 RX ORDER — PEN NEEDLE, DIABETIC 32GX 5/32"
NEEDLE, DISPOSABLE MISCELLANEOUS
Qty: 100 EACH | Refills: 3 | Status: SHIPPED | OUTPATIENT
Start: 2024-04-09

## 2024-04-09 RX ORDER — INSULIN DEGLUDEC 200 U/ML
10 INJECTION, SOLUTION SUBCUTANEOUS DAILY
Qty: 3 ADJUSTABLE DOSE PRE-FILLED PEN SYRINGE | Refills: 2 | Status: SHIPPED | OUTPATIENT
Start: 2024-04-09

## 2024-04-09 NOTE — TELEPHONE ENCOUNTER
Onur/[facility]  requesting to  continuing Home Health for 2 times a week for 6 weeks. Phone: [phone]

## 2024-04-09 NOTE — TELEPHONE ENCOUNTER
Called and spoke with Onur. Gave verbal okay per Dr. Perez to continue HH twice a week for 6 weeks. Onur verbalized understanding.

## 2024-04-11 ENCOUNTER — TELEPHONE (OUTPATIENT)
Age: 69
End: 2024-04-11

## 2024-04-11 NOTE — TELEPHONE ENCOUNTER
Called and spoke with Cathy. Gave verbal okay to see patient for speech. Cathy verbalized understanding.

## 2024-04-11 NOTE — TELEPHONE ENCOUNTER
Cathy Vee from [facility] would like to do speech therapy for patient following discharge today. She is getting it at the hospital and would like to continue it. Please call Cathy back at [phone].

## 2024-04-15 ENCOUNTER — CLINICAL DOCUMENTATION (OUTPATIENT)
Age: 69
End: 2024-04-15

## 2024-04-16 ENCOUNTER — CLINICAL DOCUMENTATION (OUTPATIENT)
Age: 69
End: 2024-04-16

## 2024-04-23 NOTE — TELEPHONE ENCOUNTER
Aurelio-RN with [facility] - patient has small amount of yeast and is requesting Nystatin powder. Cathy's phone: [phone]

## 2024-04-25 RX ORDER — NYSTATIN 100000 [USP'U]/G
POWDER TOPICAL
Qty: 60 G | Refills: 1 | Status: SHIPPED | OUTPATIENT
Start: 2024-04-25

## 2024-04-26 ENCOUNTER — TELEPHONE (OUTPATIENT)
Age: 69
End: 2024-04-26

## 2024-04-26 ENCOUNTER — CLINICAL DOCUMENTATION (OUTPATIENT)
Age: 69
End: 2024-04-26

## 2024-05-01 ENCOUNTER — CLINICAL DOCUMENTATION (OUTPATIENT)
Age: 69
End: 2024-05-01

## 2024-05-01 ENCOUNTER — OFFICE VISIT (OUTPATIENT)
Age: 69
End: 2024-05-01
Payer: MEDICARE

## 2024-05-01 VITALS
TEMPERATURE: 98 F | HEART RATE: 108 BPM | SYSTOLIC BLOOD PRESSURE: 98 MMHG | RESPIRATION RATE: 16 BRPM | OXYGEN SATURATION: 92 % | HEIGHT: 70 IN | DIASTOLIC BLOOD PRESSURE: 66 MMHG | BODY MASS INDEX: 39.37 KG/M2 | WEIGHT: 275 LBS

## 2024-05-01 DIAGNOSIS — Z79.01 WARFARIN ANTICOAGULATION: Primary | ICD-10-CM

## 2024-05-01 LAB
POC INR: 3.8
PROTHROMBIN TIME, POC: 35.2

## 2024-05-01 PROCEDURE — 85610 PROTHROMBIN TIME: CPT | Performed by: FAMILY MEDICINE

## 2024-05-01 PROCEDURE — 99213 OFFICE O/P EST LOW 20 MIN: CPT | Performed by: FAMILY MEDICINE

## 2024-05-01 RX ORDER — WARFARIN SODIUM 1 MG/1
TABLET ORAL
Qty: 45 TABLET | Refills: 3 | Status: SHIPPED | OUTPATIENT
Start: 2024-05-01

## 2024-05-01 NOTE — PROGRESS NOTES
Chief Complaint   Patient presents with    Follow-up     INR      Patient presents in office today for INR check.  No concerns.    \"Have you been to the ER, urgent care clinic since your last visit?  Hospitalized since your last visit?\"    YES - When: approximately 3/26/24-4/2/24 ago.  Where and Why: [facility] for hypotension.    “Have you seen or consulted any other health care providers outside of [facility] since your last visit?”    NO        “Have you had a colorectal cancer screening such as a colonoscopy/FIT/Cologuard?    NO    Date of last Colonoscopy: 10/13/2020  No cologuard on file  Date of last FIT: 9/7/2022   No flexible sigmoidoscopy on file         Click Here for Release of Records Request

## 2024-05-01 NOTE — PROGRESS NOTES
Progress Note    she is a 68 y.o. year old female who presents for evaluation.    Subjective:     Pt here for INR check and is currently a little high at 3.8.  No recent abx use.  Insulin was too expensive at $100 and she states her sugars have come down.  No bleeding issues.    She is still having sig dry mouth and is doing speech and was recommended to use biotene which I agree with.      Reviewed PmHx, RxHx, FmHx, SocHx, AllgHx and updated and dated in the chart.    Review of Systems - negative except as listed above in the HPI    Objective:     Vitals:    05/01/24 1505   BP: 98/66   Site: Right Upper Arm   Position: Sitting   Pulse: (!) 108   Resp: 16   Temp: 98 °F (36.7 °C)   TempSrc: Oral   SpO2: 92%   Weight: 124.7 kg (275 lb)   Height: 1.778 m (5' 10\")       Current Outpatient Medications   Medication Sig    warfarin (COUMADIN) 1 MG tablet Take 1 mg Sun-Mon-Tues- Wed Thurs-Sat and 2.5 mg on Friday    nystatin (MYCOSTATIN) 837429 UNIT/GM powder Apply 3 times daily.    fludrocortisone (FLORINEF) 0.1 MG tablet Take 1 tablet by mouth 2 times daily    midodrine (PROAMATINE) 5 MG tablet Take 3 tablets by mouth 3 times daily    folic acid (FOLVITE) 1 MG tablet Take 1 tablet by mouth once daily    allopurinol (ZYLOPRIM) 100 MG tablet Take 1 tablet by mouth daily    magnesium 30 MG tablet Take 1 tablet by mouth 2 times daily    nitroGLYCERIN (NITROSTAT) 0.4 MG SL tablet DISSOLVE ONE TABLET UNDER THE TONGUE EVERY 5 MINUTES AS NEEDED FOR CHEST PAIN.  DO NOT EXCEED A TOTAL OF 3 DOSES IN 15 MINUTES    atorvastatin (LIPITOR) 80 MG tablet TAKE 1 TABLET BY MOUTH ONCE DAILY AT NIGHT    CALCITRIOL PO Take 0.5 mg by mouth    OXYGEN O2 2L NC 24/7    albuterol sulfate HFA (PROVENTIL;VENTOLIN;PROAIR) 108 (90 Base) MCG/ACT inhaler Inhale 2 puffs into the lungs every 4 hours as needed    fluocinolone 0.01 % cream Apply topically 2 times daily as needed    ondansetron (ZOFRAN-ODT) 4 MG disintegrating tablet Take 1 tablet by

## 2024-05-01 NOTE — PATIENT INSTRUCTIONS

## 2024-05-10 ENCOUNTER — TELEPHONE (OUTPATIENT)
Age: 69
End: 2024-05-10

## 2024-05-10 NOTE — TELEPHONE ENCOUNTER
Kristyn with [facility] called in and wanted to let you know pt canceled her 2nd speech therapy session today due to having another appointment.

## 2024-05-20 ENCOUNTER — TELEPHONE (OUTPATIENT)
Age: 69
End: 2024-05-20

## 2024-05-20 NOTE — TELEPHONE ENCOUNTER
----- Message from Dayna Akhtar sent at 5/20/2024  2:17 PM EDT -----  Subject: Refill Request    QUESTIONS  Name of Medication? oxyCODONE-acetaminophen (PERCOCET)  MG per   tablet  Patient-reported dosage and instructions? 10 325  How many days do you have left? 0  Preferred Pharmacy? [id number]  Pharmacy phone number (if available)? [phone]  ---------------------------------------------------------------------------  --------------  CALL BACK INFO  What is the best way for the office to contact you? OK to leave message on   voicemail  Preferred Call Back Phone Number? [phone]  ---------------------------------------------------------------------------  --------------  SCRIPT ANSWERS  Relationship to Patient? Self

## 2024-05-21 DIAGNOSIS — R07.89 OTHER CHEST PAIN: ICD-10-CM

## 2024-05-21 DIAGNOSIS — M15.9 PRIMARY OSTEOARTHRITIS INVOLVING MULTIPLE JOINTS: ICD-10-CM

## 2024-05-21 RX ORDER — OXYCODONE AND ACETAMINOPHEN 10; 325 MG/1; MG/1
1 TABLET ORAL EVERY 8 HOURS PRN
Qty: 90 TABLET | Refills: 0 | Status: SHIPPED | OUTPATIENT
Start: 2024-05-21 | End: 2024-06-20

## 2024-05-23 ENCOUNTER — CLINICAL DOCUMENTATION (OUTPATIENT)
Age: 69
End: 2024-05-23

## 2024-05-30 ENCOUNTER — APPOINTMENT (OUTPATIENT)
Facility: HOSPITAL | Age: 69
DRG: 871 | End: 2024-05-30
Payer: MEDICARE

## 2024-05-30 ENCOUNTER — CLINICAL DOCUMENTATION (OUTPATIENT)
Age: 69
End: 2024-05-30

## 2024-05-30 ENCOUNTER — OFFICE VISIT (OUTPATIENT)
Age: 69
End: 2024-05-30
Payer: MEDICARE

## 2024-05-30 ENCOUNTER — HOSPITAL ENCOUNTER (INPATIENT)
Facility: HOSPITAL | Age: 69
LOS: 5 days | Discharge: HOME | DRG: 871 | End: 2024-06-04
Attending: STUDENT IN AN ORGANIZED HEALTH CARE EDUCATION/TRAINING PROGRAM | Admitting: FAMILY MEDICINE
Payer: MEDICARE

## 2024-05-30 VITALS
OXYGEN SATURATION: 94 % | TEMPERATURE: 97.3 F | RESPIRATION RATE: 16 BRPM | DIASTOLIC BLOOD PRESSURE: 65 MMHG | SYSTOLIC BLOOD PRESSURE: 97 MMHG | BODY MASS INDEX: 39.46 KG/M2 | HEART RATE: 116 BPM | HEIGHT: 70 IN

## 2024-05-30 DIAGNOSIS — R79.1 SUPRATHERAPEUTIC INR: ICD-10-CM

## 2024-05-30 DIAGNOSIS — Z79.01 WARFARIN ANTICOAGULATION: Primary | ICD-10-CM

## 2024-05-30 DIAGNOSIS — R60.0 PERIPHERAL EDEMA: ICD-10-CM

## 2024-05-30 DIAGNOSIS — E87.20 LACTIC ACIDOSIS: ICD-10-CM

## 2024-05-30 DIAGNOSIS — R06.89 ABNORMAL BREATH SOUNDS: ICD-10-CM

## 2024-05-30 DIAGNOSIS — R06.02 SOB (SHORTNESS OF BREATH): ICD-10-CM

## 2024-05-30 DIAGNOSIS — K62.5 RECTAL BLEEDING: ICD-10-CM

## 2024-05-30 DIAGNOSIS — K64.9 HEMORRHOIDS, UNSPECIFIED HEMORRHOID TYPE: ICD-10-CM

## 2024-05-30 DIAGNOSIS — M79.89 LEG SWELLING: ICD-10-CM

## 2024-05-30 DIAGNOSIS — R79.1 ELEVATED INR: ICD-10-CM

## 2024-05-30 DIAGNOSIS — K62.89 PROCTITIS: Primary | ICD-10-CM

## 2024-05-30 LAB
ALBUMIN SERPL-MCNC: 1.8 G/DL (ref 3.5–5)
ALBUMIN/GLOB SERPL: 0.5 (ref 1.1–2.2)
ALP SERPL-CCNC: 238 U/L (ref 45–117)
ALT SERPL-CCNC: 15 U/L (ref 12–78)
ANION GAP SERPL CALC-SCNC: 10 MMOL/L (ref 5–15)
AST SERPL-CCNC: 27 U/L (ref 15–37)
BASOPHILS # BLD: 0.1 K/UL (ref 0–0.1)
BASOPHILS # BLD: 0.1 K/UL (ref 0–0.1)
BASOPHILS NFR BLD: 1 % (ref 0–1)
BASOPHILS NFR BLD: 1 % (ref 0–1)
BILIRUB SERPL-MCNC: 0.6 MG/DL (ref 0.2–1)
BUN SERPL-MCNC: 12 MG/DL (ref 6–20)
BUN/CREAT SERPL: 2 (ref 12–20)
CALCIUM SERPL-MCNC: 9.5 MG/DL (ref 8.5–10.1)
CHLORIDE SERPL-SCNC: 95 MMOL/L (ref 97–108)
CO2 SERPL-SCNC: 26 MMOL/L (ref 21–32)
CREAT SERPL-MCNC: 5.2 MG/DL (ref 0.55–1.02)
DIFFERENTIAL METHOD BLD: ABNORMAL
DIFFERENTIAL METHOD BLD: ABNORMAL
EOSINOPHIL # BLD: 0.1 K/UL (ref 0–0.4)
EOSINOPHIL # BLD: 0.3 K/UL (ref 0–0.4)
EOSINOPHIL NFR BLD: 1 % (ref 0–7)
EOSINOPHIL NFR BLD: 3 % (ref 0–7)
ERYTHROCYTE [DISTWIDTH] IN BLOOD BY AUTOMATED COUNT: 18.2 % (ref 11.5–14.5)
ERYTHROCYTE [DISTWIDTH] IN BLOOD BY AUTOMATED COUNT: 18.5 % (ref 11.5–14.5)
GLOBULIN SER CALC-MCNC: 3.9 G/DL (ref 2–4)
GLUCOSE SERPL-MCNC: 134 MG/DL (ref 65–100)
HCT VFR BLD AUTO: 36.3 % (ref 35–47)
HCT VFR BLD AUTO: 39.2 % (ref 35–47)
HGB BLD-MCNC: 11.7 G/DL (ref 11.5–16)
HGB BLD-MCNC: 12.6 G/DL (ref 11.5–16)
IMM GRANULOCYTES # BLD AUTO: 0.1 K/UL (ref 0–0.04)
IMM GRANULOCYTES # BLD AUTO: 0.1 K/UL (ref 0–0.04)
IMM GRANULOCYTES NFR BLD AUTO: 1 % (ref 0–0.5)
IMM GRANULOCYTES NFR BLD AUTO: 1 % (ref 0–0.5)
INR PPP: >13.9 (ref 0.9–1.1)
LACTATE SERPL-SCNC: 5.2 MMOL/L (ref 0.4–2)
LIPASE SERPL-CCNC: 81 U/L (ref 13–75)
LYMPHOCYTES # BLD: 2 K/UL (ref 0.8–3.5)
LYMPHOCYTES # BLD: 2 K/UL (ref 0.8–3.5)
LYMPHOCYTES NFR BLD: 18 % (ref 12–49)
LYMPHOCYTES NFR BLD: 23 % (ref 12–49)
MCH RBC QN AUTO: 31.3 PG (ref 26–34)
MCH RBC QN AUTO: 31.5 PG (ref 26–34)
MCHC RBC AUTO-ENTMCNC: 32.1 G/DL (ref 30–36.5)
MCHC RBC AUTO-ENTMCNC: 32.2 G/DL (ref 30–36.5)
MCV RBC AUTO: 97.1 FL (ref 80–99)
MCV RBC AUTO: 98 FL (ref 80–99)
MONOCYTES # BLD: 0.6 K/UL (ref 0–1)
MONOCYTES # BLD: 0.8 K/UL (ref 0–1)
MONOCYTES NFR BLD: 6 % (ref 5–13)
MONOCYTES NFR BLD: 7 % (ref 5–13)
NEUTS SEG # BLD: 5.9 K/UL (ref 1.8–8)
NEUTS SEG # BLD: 8 K/UL (ref 1.8–8)
NEUTS SEG NFR BLD: 66 % (ref 32–75)
NEUTS SEG NFR BLD: 72 % (ref 32–75)
NRBC # BLD: 0.15 K/UL (ref 0–0.01)
NRBC # BLD: 0.18 K/UL (ref 0–0.01)
NRBC BLD-RTO: 1.4 PER 100 WBC
NRBC BLD-RTO: 2 PER 100 WBC
NT PRO BNP: 3006 PG/ML
PLATELET # BLD AUTO: 196 K/UL (ref 150–400)
PLATELET # BLD AUTO: 202 K/UL (ref 150–400)
PMV BLD AUTO: 11.1 FL (ref 8.9–12.9)
PMV BLD AUTO: 11.7 FL (ref 8.9–12.9)
POC INR: NORMAL
POTASSIUM SERPL-SCNC: 4.2 MMOL/L (ref 3.5–5.1)
PROCALCITONIN SERPL-MCNC: 0.55 NG/ML
PROT SERPL-MCNC: 5.7 G/DL (ref 6.4–8.2)
PROTHROMBIN TIME, POC: NORMAL
PROTHROMBIN TIME: >120 SEC (ref 9–11.1)
RBC # BLD AUTO: 3.74 M/UL (ref 3.8–5.2)
RBC # BLD AUTO: 4 M/UL (ref 3.8–5.2)
SODIUM SERPL-SCNC: 131 MMOL/L (ref 136–145)
TROPONIN I SERPL HS-MCNC: 17 NG/L (ref 0–51)
WBC # BLD AUTO: 11 K/UL (ref 3.6–11)
WBC # BLD AUTO: 8.9 K/UL (ref 3.6–11)

## 2024-05-30 PROCEDURE — 83690 ASSAY OF LIPASE: CPT

## 2024-05-30 PROCEDURE — 85025 COMPLETE CBC W/AUTO DIFF WBC: CPT

## 2024-05-30 PROCEDURE — 93005 ELECTROCARDIOGRAM TRACING: CPT | Performed by: FAMILY MEDICINE

## 2024-05-30 PROCEDURE — 89050 BODY FLUID CELL COUNT: CPT

## 2024-05-30 PROCEDURE — 71250 CT THORAX DX C-: CPT

## 2024-05-30 PROCEDURE — 3E1M39Z IRRIGATION OF PERITONEAL CAVITY USING DIALYSATE, PERCUTANEOUS APPROACH: ICD-10-PCS | Performed by: FAMILY MEDICINE

## 2024-05-30 PROCEDURE — 6370000000 HC RX 637 (ALT 250 FOR IP): Performed by: NURSE PRACTITIONER

## 2024-05-30 PROCEDURE — 87015 SPECIMEN INFECT AGNT CONCNTJ: CPT

## 2024-05-30 PROCEDURE — 85610 PROTHROMBIN TIME: CPT

## 2024-05-30 PROCEDURE — 2580000003 HC RX 258: Performed by: STUDENT IN AN ORGANIZED HEALTH CARE EDUCATION/TRAINING PROGRAM

## 2024-05-30 PROCEDURE — 99285 EMERGENCY DEPT VISIT HI MDM: CPT

## 2024-05-30 PROCEDURE — 99214 OFFICE O/P EST MOD 30 MIN: CPT | Performed by: FAMILY MEDICINE

## 2024-05-30 PROCEDURE — 85610 PROTHROMBIN TIME: CPT | Performed by: FAMILY MEDICINE

## 2024-05-30 PROCEDURE — 2500000003 HC RX 250 WO HCPCS: Performed by: STUDENT IN AN ORGANIZED HEALTH CARE EDUCATION/TRAINING PROGRAM

## 2024-05-30 PROCEDURE — 84145 PROCALCITONIN (PCT): CPT

## 2024-05-30 PROCEDURE — 83880 ASSAY OF NATRIURETIC PEPTIDE: CPT

## 2024-05-30 PROCEDURE — 2580000003 HC RX 258: Performed by: NURSE PRACTITIONER

## 2024-05-30 PROCEDURE — 87040 BLOOD CULTURE FOR BACTERIA: CPT

## 2024-05-30 PROCEDURE — 96374 THER/PROPH/DIAG INJ IV PUSH: CPT

## 2024-05-30 PROCEDURE — 83605 ASSAY OF LACTIC ACID: CPT

## 2024-05-30 PROCEDURE — 90945 DIALYSIS ONE EVALUATION: CPT

## 2024-05-30 PROCEDURE — 6370000000 HC RX 637 (ALT 250 FOR IP): Performed by: STUDENT IN AN ORGANIZED HEALTH CARE EDUCATION/TRAINING PROGRAM

## 2024-05-30 PROCEDURE — 36415 COLL VENOUS BLD VENIPUNCTURE: CPT

## 2024-05-30 PROCEDURE — 87070 CULTURE OTHR SPECIMN AEROBIC: CPT

## 2024-05-30 PROCEDURE — 80053 COMPREHEN METABOLIC PANEL: CPT

## 2024-05-30 PROCEDURE — 87205 SMEAR GRAM STAIN: CPT

## 2024-05-30 PROCEDURE — 1100000000 HC RM PRIVATE

## 2024-05-30 PROCEDURE — 6360000002 HC RX W HCPCS: Performed by: STUDENT IN AN ORGANIZED HEALTH CARE EDUCATION/TRAINING PROGRAM

## 2024-05-30 PROCEDURE — 84484 ASSAY OF TROPONIN QUANT: CPT

## 2024-05-30 RX ORDER — SODIUM CHLORIDE, SODIUM LACTATE, CALCIUM CHLORIDE, MAGNESIUM CHLORIDE AND DEXTROSE 2.5; 538; 448; 18.3; 5.08 G/100ML; MG/100ML; MG/100ML; MG/100ML; MG/100ML
6000 INJECTION, SOLUTION INTRAPERITONEAL EVERY EVENING
Status: DISCONTINUED | OUTPATIENT
Start: 2024-05-30 | End: 2024-05-30

## 2024-05-30 RX ORDER — 0.9 % SODIUM CHLORIDE 0.9 %
500 INTRAVENOUS SOLUTION INTRAVENOUS ONCE
Status: COMPLETED | OUTPATIENT
Start: 2024-05-30 | End: 2024-05-30

## 2024-05-30 RX ORDER — HYDROCORTISONE 25 MG/G
CREAM TOPICAL 2 TIMES DAILY
Status: DISCONTINUED | OUTPATIENT
Start: 2024-05-30 | End: 2024-06-04 | Stop reason: HOSPADM

## 2024-05-30 RX ORDER — SODIUM CHLORIDE, SODIUM LACTATE, CALCIUM CHLORIDE, MAGNESIUM CHLORIDE AND DEXTROSE 2.5; 538; 448; 18.3; 5.08 G/100ML; MG/100ML; MG/100ML; MG/100ML; MG/100ML
6000 INJECTION, SOLUTION INTRAPERITONEAL
Status: DISCONTINUED | OUTPATIENT
Start: 2024-05-30 | End: 2024-06-04 | Stop reason: HOSPADM

## 2024-05-30 RX ORDER — ACETAMINOPHEN 650 MG/1
650 SUPPOSITORY RECTAL EVERY 6 HOURS PRN
Status: DISCONTINUED | OUTPATIENT
Start: 2024-05-30 | End: 2024-06-04 | Stop reason: HOSPADM

## 2024-05-30 RX ORDER — SODIUM CHLORIDE 9 MG/ML
INJECTION, SOLUTION INTRAVENOUS PRN
Status: DISCONTINUED | OUTPATIENT
Start: 2024-05-30 | End: 2024-06-04 | Stop reason: HOSPADM

## 2024-05-30 RX ORDER — POLYETHYLENE GLYCOL 3350 17 G/17G
17 POWDER, FOR SOLUTION ORAL DAILY
Status: DISCONTINUED | OUTPATIENT
Start: 2024-05-31 | End: 2024-05-31

## 2024-05-30 RX ORDER — PANTOPRAZOLE SODIUM 40 MG/1
40 TABLET, DELAYED RELEASE ORAL
Status: DISCONTINUED | OUTPATIENT
Start: 2024-05-31 | End: 2024-06-04 | Stop reason: HOSPADM

## 2024-05-30 RX ORDER — GENTAMICIN SULFATE 1 MG/G
CREAM TOPICAL DAILY PRN
Status: DISCONTINUED | OUTPATIENT
Start: 2024-05-30 | End: 2024-06-04 | Stop reason: HOSPADM

## 2024-05-30 RX ORDER — ONDANSETRON 2 MG/ML
4 INJECTION INTRAMUSCULAR; INTRAVENOUS EVERY 6 HOURS PRN
Status: DISCONTINUED | OUTPATIENT
Start: 2024-05-30 | End: 2024-06-04 | Stop reason: HOSPADM

## 2024-05-30 RX ORDER — ATORVASTATIN CALCIUM 20 MG/1
80 TABLET, FILM COATED ORAL NIGHTLY
Status: DISCONTINUED | OUTPATIENT
Start: 2024-05-30 | End: 2024-06-03

## 2024-05-30 RX ORDER — HYDROCORTISONE 25 MG/G
CREAM TOPICAL
Qty: 30 G | Refills: 1 | Status: ON HOLD | OUTPATIENT
Start: 2024-05-30

## 2024-05-30 RX ORDER — SODIUM CHLORIDE, SODIUM LACTATE, CALCIUM CHLORIDE, MAGNESIUM CHLORIDE AND DEXTROSE 4.25; 538; 448; 18.3; 5.08 G/100ML; MG/100ML; MG/100ML; MG/100ML; MG/100ML
3000 INJECTION, SOLUTION INTRAPERITONEAL EVERY EVENING
Status: DISCONTINUED | OUTPATIENT
Start: 2024-05-30 | End: 2024-05-30

## 2024-05-30 RX ORDER — POLYETHYLENE GLYCOL 3350 17 G/17G
17 POWDER, FOR SOLUTION ORAL DAILY PRN
Status: DISCONTINUED | OUTPATIENT
Start: 2024-05-30 | End: 2024-05-30

## 2024-05-30 RX ORDER — SODIUM CHLORIDE, SODIUM LACTATE, CALCIUM CHLORIDE, MAGNESIUM CHLORIDE AND DEXTROSE 1.5; 538; 448; 18.3; 5.08 G/100ML; MG/100ML; MG/100ML; MG/100ML; MG/100ML
6000 INJECTION, SOLUTION INTRAPERITONEAL
Status: DISCONTINUED | OUTPATIENT
Start: 2024-05-30 | End: 2024-05-30

## 2024-05-30 RX ORDER — POLYETHYLENE GLYCOL 3350 17 G/17G
17 POWDER, FOR SOLUTION ORAL DAILY PRN
Status: DISCONTINUED | OUTPATIENT
Start: 2024-05-30 | End: 2024-05-30 | Stop reason: SDUPTHER

## 2024-05-30 RX ORDER — ACETAMINOPHEN 325 MG/1
650 TABLET ORAL EVERY 6 HOURS PRN
Status: DISCONTINUED | OUTPATIENT
Start: 2024-05-30 | End: 2024-06-04 | Stop reason: HOSPADM

## 2024-05-30 RX ORDER — HYDROMORPHONE HYDROCHLORIDE 1 MG/ML
1 INJECTION, SOLUTION INTRAMUSCULAR; INTRAVENOUS; SUBCUTANEOUS
Status: COMPLETED | OUTPATIENT
Start: 2024-05-30 | End: 2024-05-31

## 2024-05-30 RX ORDER — OXYCODONE AND ACETAMINOPHEN 10; 325 MG/1; MG/1
1 TABLET ORAL EVERY 8 HOURS PRN
Status: DISCONTINUED | OUTPATIENT
Start: 2024-05-30 | End: 2024-06-04 | Stop reason: HOSPADM

## 2024-05-30 RX ORDER — SODIUM CHLORIDE 0.9 % (FLUSH) 0.9 %
5-40 SYRINGE (ML) INJECTION PRN
Status: DISCONTINUED | OUTPATIENT
Start: 2024-05-30 | End: 2024-06-04 | Stop reason: HOSPADM

## 2024-05-30 RX ORDER — MIDODRINE HYDROCHLORIDE 5 MG/1
15 TABLET ORAL 3 TIMES DAILY
Status: DISCONTINUED | OUTPATIENT
Start: 2024-05-30 | End: 2024-06-04 | Stop reason: HOSPADM

## 2024-05-30 RX ORDER — FUROSEMIDE 10 MG/ML
40 INJECTION INTRAMUSCULAR; INTRAVENOUS ONCE
Status: DISCONTINUED | OUTPATIENT
Start: 2024-05-30 | End: 2024-05-31

## 2024-05-30 RX ORDER — LEVALBUTEROL INHALATION SOLUTION 1.25 MG/3ML
1.25 SOLUTION RESPIRATORY (INHALATION) EVERY 8 HOURS PRN
Status: DISCONTINUED | OUTPATIENT
Start: 2024-05-30 | End: 2024-06-04 | Stop reason: HOSPADM

## 2024-05-30 RX ORDER — SODIUM CHLORIDE, SODIUM LACTATE, CALCIUM CHLORIDE, MAGNESIUM CHLORIDE AND DEXTROSE 2.5; 538; 448; 18.3; 5.08 G/100ML; MG/100ML; MG/100ML; MG/100ML; MG/100ML
6000 INJECTION, SOLUTION INTRAPERITONEAL
Status: DISCONTINUED | OUTPATIENT
Start: 2024-05-30 | End: 2024-05-31

## 2024-05-30 RX ORDER — HYDROMORPHONE HYDROCHLORIDE 1 MG/ML
1 INJECTION, SOLUTION INTRAMUSCULAR; INTRAVENOUS; SUBCUTANEOUS ONCE
Status: COMPLETED | OUTPATIENT
Start: 2024-05-30 | End: 2024-05-30

## 2024-05-30 RX ORDER — ALLOPURINOL 100 MG/1
100 TABLET ORAL DAILY
Status: DISCONTINUED | OUTPATIENT
Start: 2024-05-31 | End: 2024-06-04 | Stop reason: HOSPADM

## 2024-05-30 RX ORDER — METRONIDAZOLE 500 MG/100ML
500 INJECTION, SOLUTION INTRAVENOUS EVERY 8 HOURS
Status: DISCONTINUED | OUTPATIENT
Start: 2024-05-31 | End: 2024-06-02

## 2024-05-30 RX ORDER — METRONIDAZOLE 500 MG/100ML
500 INJECTION, SOLUTION INTRAVENOUS ONCE
Status: COMPLETED | OUTPATIENT
Start: 2024-05-30 | End: 2024-05-30

## 2024-05-30 RX ORDER — FOLIC ACID 1 MG/1
1000 TABLET ORAL DAILY
Status: DISCONTINUED | OUTPATIENT
Start: 2024-05-31 | End: 2024-06-04 | Stop reason: HOSPADM

## 2024-05-30 RX ORDER — SODIUM CHLORIDE 0.9 % (FLUSH) 0.9 %
5-40 SYRINGE (ML) INJECTION EVERY 12 HOURS SCHEDULED
Status: DISCONTINUED | OUTPATIENT
Start: 2024-05-30 | End: 2024-06-04 | Stop reason: HOSPADM

## 2024-05-30 RX ADMIN — GENTAMICIN SULFATE: 1 CREAM TOPICAL at 23:12

## 2024-05-30 RX ADMIN — WATER 2000 MG: 1 INJECTION INTRAMUSCULAR; INTRAVENOUS; SUBCUTANEOUS at 20:12

## 2024-05-30 RX ADMIN — ONDANSETRON 4 MG: 2 INJECTION INTRAMUSCULAR; INTRAVENOUS at 20:07

## 2024-05-30 RX ADMIN — METRONIDAZOLE 500 MG: 500 INJECTION, SOLUTION INTRAVENOUS at 20:18

## 2024-05-30 RX ADMIN — SODIUM CHLORIDE, SODIUM LACTATE, CALCIUM CHLORIDE, MAGNESIUM CHLORIDE AND DEXTROSE 6000 ML: 2.5; 538; 448; 18.3; 5.08 INJECTION, SOLUTION INTRAPERITONEAL at 23:12

## 2024-05-30 RX ADMIN — SODIUM CHLORIDE 500 ML: 9 INJECTION, SOLUTION INTRAVENOUS at 20:10

## 2024-05-30 RX ADMIN — PHYTONADIONE 5 MG: 10 INJECTION, EMULSION INTRAMUSCULAR; INTRAVENOUS; SUBCUTANEOUS at 20:06

## 2024-05-30 RX ADMIN — HYDROMORPHONE HYDROCHLORIDE 1 MG: 1 INJECTION, SOLUTION INTRAMUSCULAR; INTRAVENOUS; SUBCUTANEOUS at 18:49

## 2024-05-30 ASSESSMENT — LIFESTYLE VARIABLES
HOW MANY STANDARD DRINKS CONTAINING ALCOHOL DO YOU HAVE ON A TYPICAL DAY: PATIENT DOES NOT DRINK
HOW OFTEN DO YOU HAVE A DRINK CONTAINING ALCOHOL: NEVER

## 2024-05-30 ASSESSMENT — PAIN SCALES - GENERAL
PAINLEVEL_OUTOF10: 8
PAINLEVEL_OUTOF10: 10
PAINLEVEL_OUTOF10: 10

## 2024-05-30 ASSESSMENT — PAIN DESCRIPTION - ORIENTATION: ORIENTATION: OTHER (COMMENT)

## 2024-05-30 ASSESSMENT — PAIN DESCRIPTION - DESCRIPTORS
DESCRIPTORS: ACHING
DESCRIPTORS: ACHING

## 2024-05-30 ASSESSMENT — PAIN DESCRIPTION - LOCATION: LOCATION: GENERALIZED

## 2024-05-30 ASSESSMENT — PAIN - FUNCTIONAL ASSESSMENT: PAIN_FUNCTIONAL_ASSESSMENT: 0-10

## 2024-05-30 NOTE — ED TRIAGE NOTES
Pt arrives to ED via POV in wheelchair c/o generalized edema and SOB x \"a couple of weeks\". Pt is on 2L NC O2 at home. Pt went to get blood work done for INR level and was found to have INR >8. Pt is a PD patient. Pt reports rectal bleeding from hemorrhoids. Provider in triage.

## 2024-05-30 NOTE — ED PROVIDER NOTES
CBC WITH AUTO DIFFERENTIAL       IMAGING RESULTS:  CT CHEST ABDOMEN PELVIS WO CONTRAST Additional Contrast? None   Final Result   1.  Findings which could reflect proctitis.   2.  Small volume ascites with peritoneal catheter.   3.  Anasarca.   4.  No acute thoracic abnormality.   5.  Incidental findings as above.             MEDICATIONS GIVEN:  Medications   ceFEPIme (MAXIPIME) 2,000 mg in sterile water 20 mL IV syringe (has no administration in time range)   metroNIDAZOLE (FLAGYL) 500 mg in 0.9% NaCl 100 mL IVPB premix (has no administration in time range)   sodium chloride 0.9 % bolus 500 mL (has no administration in time range)   phytonadione (VITAMIN K) 1 mg/mL oral solution 5 mg (has no administration in time range)   ondansetron (ZOFRAN) injection 4 mg (has no administration in time range)   HYDROmorphone HCl PF (DILAUDID) injection 1 mg (has no administration in time range)   HYDROmorphone HCl PF (DILAUDID) injection 1 mg (1 mg IntraVENous Given 5/30/24 1849)       CONSULTS:  IP CONSULT TO NEPHROLOGY  IP CONSULT TO FAMILY MEDICINE    ED Course as of 05/30/24 1951   Thu May 30, 2024   1736 This EKG was interpreted by me at 1705.  Normal sinus rhythm at 95 bpm.  Normal axis.  Q waves are present in lead III.  No other significant ST segment abnormalities [JT]      ED Course User Index  [JT] Ruddy Berger MD   Perfect Serve Consult for Admission  7:51 PM    ED Room Number: ER18/18  Patient Name and age:  Elizabeth Hopkins 68 y.o.  female  Working Diagnosis:   1. Proctitis    2. Peripheral edema    3. Leg swelling    4. Supratherapeutic INR    5. Hemorrhoids, unspecified hemorrhoid type    6. Lactic acidosis        COVID-19 Suspicion: No  Sepsis present:  No  Reassessment needed: No  Code Status:  Full Code  Readmission: No  Isolation Requirements: no  Recommended Level of Care: step down  Department: [facility] - [phone]  Consulting Provider: Family medicine team    Other: Patient presenting

## 2024-05-30 NOTE — PROGRESS NOTES
Progress Note    she is a 68 y.o. year old female who presents for evaluation.    Subjective:     Patient here for an INR check patient appears very unwell.  Her INR was greater than 8 this was checked to 2 times.  She also reports rectal bleeding with significant hemorrhoids.  She is going to make an appoint with her GI specialist as well.  Heart rate elevated to 116.  She states her blood pressure is better than it normally is states been running in the 70s for systolic 97/65 today.  She is not in any acute distress at this very moment however she does appear unwell does not appear toxic.Mentating normally.  She is reporting shortness of breath.  She is on her 2 L with her portable going between 91 to 93% SO2.    Reviewed PmHx, RxHx, FmHx, SocHx, AllgHx and updated and dated in the chart.    Review of Systems - negative except as listed above in the HPI    Objective:     Vitals:    05/30/24 1513   BP: 97/65   Site: Left Upper Arm   Position: Sitting   Pulse: (!) 116   Resp: 16   Temp: 97.3 °F (36.3 °C)   TempSrc: Oral   SpO2: 94%   Height: 1.778 m (5' 10\")       Current Outpatient Medications   Medication Sig    hydrocortisone (ANUSOL-HC) 2.5 % CREA rectal cream Tid to rectum prn hemorrhoids    oxyCODONE-acetaminophen (PERCOCET)  MG per tablet Take 1 tablet by mouth every 8 hours as needed for Pain for up to 30 days. Max Daily Amount: 3 tablets    warfarin (COUMADIN) 1 MG tablet Take 1 mg Sun-Mon-Tues- Wed Thurs-Sat and 2.5 mg on Friday    nystatin (MYCOSTATIN) 040811 UNIT/GM powder Apply 3 times daily.    midodrine (PROAMATINE) 5 MG tablet Take 3 tablets by mouth 3 times daily    folic acid (FOLVITE) 1 MG tablet Take 1 tablet by mouth once daily    allopurinol (ZYLOPRIM) 100 MG tablet Take 1 tablet by mouth daily    magnesium 30 MG tablet Take 1 tablet by mouth 2 times daily    nitroGLYCERIN (NITROSTAT) 0.4 MG SL tablet DISSOLVE ONE TABLET UNDER THE TONGUE EVERY 5 MINUTES AS NEEDED FOR CHEST PAIN.  DO

## 2024-05-30 NOTE — PROGRESS NOTES
Chief Complaint   Patient presents with    Follow-up     INR     Patient presents in office today for INR check.  Has c/o hemorrhoids and active bleeding.  She also has c/o SOB  No other concerns.    \"Have you been to the ER, urgent care clinic since your last visit?  Hospitalized since your last visit?\"    NO    “Have you seen or consulted any other health care providers outside of [facility] since your last visit?”    NO        “Have you had a colorectal cancer screening such as a colonoscopy/FIT/Cologuard?    NO    Date of last Colonoscopy: 10/13/2020  No cologuard on file  Date of last FIT: 9/7/2022   No flexible sigmoidoscopy on file         Click Here for Release of Records Request

## 2024-05-30 NOTE — PATIENT INSTRUCTIONS
Patient Education        A Healthy Lifestyle: Care Instructions  A healthy lifestyle can help you feel good, have more energy, and stay at a weight that's healthy for you. You can share a healthy lifestyle with your friends and family. And you can do it on your own.    Eat meals with your friends or family. You could try cooking together.    Plan activities with other people. Go for a walk with a friend, try a free online fitness class, or join a sports league.    Eat a variety of healthy foods. These include fruits, vegetables, whole grains, low-fat dairy, and lean protein.    Choose healthy portions of food. You can use the Nutrition Facts label on food packages as a guide.    Eat more fruits and vegetables. You could add vegetables to sandwiches or add fruit to cereal.    Drink water when you are thirsty. Limit soda, juice, and sports drinks.    Try to exercise most days. Aim for at least 2½ hours of exercise each week.    Keep moving. Work in the garden or take your dog on a walk. Use the stairs instead of the elevator.    If you use tobacco or nicotine, try to quit. Ask your doctor about programs and medicines to help you quit.    Limit alcohol. Men should have no more than 2 drinks a day. Women should have no more than 1. For some people, no alcohol is the best choice.  Follow-up care is a key part of your treatment and safety. Be sure to make and go to all appointments, and call your doctor if you are having problems. It's also a good idea to know your test results and keep a list of the medicines you take.  Where can you learn more?  Go to https://www.[employer].net/patientEd and enter U807 to learn more about \"A Healthy Lifestyle: Care Instructions.\"  Current as of: August 6, 2023               Content Version: 14.0  © 8252-3350 [employer].   Care instructions adapted under license by [employer]. If you have questions about a medical condition or this instruction, always ask your healthcare

## 2024-05-30 NOTE — H&P
[address] [place] [address]   Office [phone]  Fax [phone]       Admission H&P     Name: Elizabeth Hopkins MRN: [id number]  Sex: Female   YOB: 1955  Age: 68 y.o.  PCP: Nadege Perez DO     Source of Information: patient, medical records    Chief complaint: Rectal bleeding and Supratherapeutic INR     History of Present Illness  Elizabeth Hopkins is a 68 y.o. 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     For the last month patient has been having bright red blood from her hemorrhoids especially with wiping, denied blood in stool.  She has been having rectal pain while having a bowel movement.  Endorses using stool softeners daily.  She has been taking her medications as prescribed and has not missed any peritoneal dialysis sessions.  She endorses generalized edema that has increased from her baseline.  She also reported decreased p.o. intake of both solids and liquids for the last month.     Is on 3L of oxygen at home.  Denied any CP/SOB unchanged from bl.     In the ER:   Vitals were notable for tachypnea 24; tachycardia 112, BP 76/59 and satting well on room air.    Labs are notable for CBC with no leukocytosis.  CMP with Cr 5.20 (at baseline).  BNP 3K.  Alk phos 238. LA 5.2.      CT C/A/P c/f proctitis, anasarca and other incidental findings.     Received Vit K, Cefepime and Flagyl in ED. She also received 500cc bolus. Nephrology consulted and they recommended PD.     Vitals:  Patient Vitals for the past 8 hrs:   Temp Pulse Resp BP SpO2   05/30/24 1921 -- 96 -- -- 98 %   05/30/24 1911 -- (!) 102 -- -- 100 %   05/30/24 1901 -- 98 -- 91/68 100 %   05/30/24 1851 -- (!) 101 -- 109/70 100 %   05/30/24 1841 -- 100 26 -- 100 %   05/30/24 1710 -- (!) 111 -- -- --   05/30/24 1641 97.9 °F (36.6 °C) (!) 112 24

## 2024-05-30 NOTE — ED NOTES
RN unable to obtain SPO2 reading in triage after attempting sticker on finger, earlobe, and nose clamp. Pt placed on 4L NC O2 and moved to room.

## 2024-05-31 ENCOUNTER — CLINICAL DOCUMENTATION (OUTPATIENT)
Age: 69
End: 2024-05-31

## 2024-05-31 DIAGNOSIS — I50.30 HEART FAILURE WITH PRESERVED EJECTION FRACTION, UNSPECIFIED HF CHRONICITY (HCC): Primary | ICD-10-CM

## 2024-05-31 DIAGNOSIS — Z99.2 ESRD ON PERITONEAL DIALYSIS (HCC): ICD-10-CM

## 2024-05-31 DIAGNOSIS — E11.59 DM TYPE 2 CAUSING VASCULAR DISEASE (HCC): ICD-10-CM

## 2024-05-31 DIAGNOSIS — K62.89 PROCTITIS: ICD-10-CM

## 2024-05-31 DIAGNOSIS — N18.6 ESRD ON PERITONEAL DIALYSIS (HCC): ICD-10-CM

## 2024-05-31 DIAGNOSIS — N20.0 NEPHROLITHIASIS: ICD-10-CM

## 2024-05-31 DIAGNOSIS — K21.9 GASTROESOPHAGEAL REFLUX DISEASE, UNSPECIFIED WHETHER ESOPHAGITIS PRESENT: ICD-10-CM

## 2024-05-31 DIAGNOSIS — Z74.09 LIMITED MOBILITY: ICD-10-CM

## 2024-05-31 DIAGNOSIS — I10 HYPERTENSION, UNSPECIFIED TYPE: ICD-10-CM

## 2024-05-31 DIAGNOSIS — J84.9 ILD (INTERSTITIAL LUNG DISEASE) (HCC): ICD-10-CM

## 2024-05-31 LAB
ALBUMIN SERPL-MCNC: 1.7 G/DL (ref 3.5–5)
ALBUMIN/GLOB SERPL: 0.5 (ref 1.1–2.2)
ALP SERPL-CCNC: 218 U/L (ref 45–117)
ALT SERPL-CCNC: 14 U/L (ref 12–78)
ANION GAP SERPL CALC-SCNC: 7 MMOL/L (ref 5–15)
APPEARANCE FLD: CLEAR
AST SERPL-CCNC: 18 U/L (ref 15–37)
BASOPHILS # BLD: 0.1 K/UL (ref 0–0.1)
BASOPHILS NFR BLD: 0 % (ref 0–1)
BILIRUB SERPL-MCNC: 0.7 MG/DL (ref 0.2–1)
BUN SERPL-MCNC: 13 MG/DL (ref 6–20)
BUN/CREAT SERPL: 2 (ref 12–20)
CALCIUM SERPL-MCNC: 8.8 MG/DL (ref 8.5–10.1)
CHLORIDE SERPL-SCNC: 96 MMOL/L (ref 97–108)
CO2 SERPL-SCNC: 27 MMOL/L (ref 21–32)
COLOR FLD: COLORLESS
COMMENT:: NORMAL
CREAT SERPL-MCNC: 5.29 MG/DL (ref 0.55–1.02)
DIFFERENTIAL METHOD BLD: ABNORMAL
EOSINOPHIL # BLD: 0.1 K/UL (ref 0–0.4)
EOSINOPHIL NFR BLD: 1 % (ref 0–7)
ERYTHROCYTE [DISTWIDTH] IN BLOOD BY AUTOMATED COUNT: 18.6 % (ref 11.5–14.5)
GLOBULIN SER CALC-MCNC: 3.7 G/DL (ref 2–4)
GLUCOSE SERPL-MCNC: 176 MG/DL (ref 65–100)
HCT VFR BLD AUTO: 36.6 % (ref 35–47)
HGB BLD-MCNC: 11.7 G/DL (ref 11.5–16)
IMM GRANULOCYTES # BLD AUTO: 0.1 K/UL (ref 0–0.04)
IMM GRANULOCYTES NFR BLD AUTO: 1 % (ref 0–0.5)
INR PPP: 11.8 (ref 0.9–1.1)
LACTATE SERPL-SCNC: 4.4 MMOL/L (ref 0.4–2)
LACTATE SERPL-SCNC: 5 MMOL/L (ref 0.4–2)
LACTATE SERPL-SCNC: 5 MMOL/L (ref 0.4–2)
LACTATE SERPL-SCNC: 5.2 MMOL/L (ref 0.4–2)
LACTATE SERPL-SCNC: 6.2 MMOL/L (ref 0.4–2)
LACTATE SERPL-SCNC: 6.7 MMOL/L (ref 0.4–2)
LYMPHOCYTES # BLD: 2.2 K/UL (ref 0.8–3.5)
LYMPHOCYTES NFR BLD: 17 % (ref 12–49)
LYMPHOCYTES NFR FLD: 56 %
MCH RBC QN AUTO: 31.3 PG (ref 26–34)
MCHC RBC AUTO-ENTMCNC: 32 G/DL (ref 30–36.5)
MCV RBC AUTO: 97.9 FL (ref 80–99)
MONOCYTES # BLD: 0.9 K/UL (ref 0–1)
MONOCYTES NFR BLD: 7 % (ref 5–13)
MONOS+MACROS NFR FLD: 38 %
NEUTROPHILS NFR FLD: 6 %
NEUTS SEG # BLD: 9.6 K/UL (ref 1.8–8)
NEUTS SEG NFR BLD: 74 % (ref 32–75)
NRBC # BLD: 0.19 K/UL (ref 0–0.01)
NRBC BLD-RTO: 1.5 PER 100 WBC
NUC CELL # FLD: 6 /CU MM
PLATELET # BLD AUTO: 191 K/UL (ref 150–400)
PMV BLD AUTO: 11.3 FL (ref 8.9–12.9)
POTASSIUM SERPL-SCNC: 3.8 MMOL/L (ref 3.5–5.1)
PROT SERPL-MCNC: 5.4 G/DL (ref 6.4–8.2)
PROTHROMBIN TIME: 105.1 SEC (ref 9–11.1)
RBC # BLD AUTO: 3.74 M/UL (ref 3.8–5.2)
RBC # FLD: 0 /CU MM
SODIUM SERPL-SCNC: 130 MMOL/L (ref 136–145)
SPECIMEN HOLD: NORMAL
SPECIMEN SOURCE FLD: ABNORMAL
WBC # BLD AUTO: 12.9 K/UL (ref 3.6–11)

## 2024-05-31 PROCEDURE — 2580000003 HC RX 258: Performed by: INTERNAL MEDICINE

## 2024-05-31 PROCEDURE — 6360000002 HC RX W HCPCS: Performed by: INTERNAL MEDICINE

## 2024-05-31 PROCEDURE — 94660 CPAP INITIATION&MGMT: CPT

## 2024-05-31 PROCEDURE — 90945 DIALYSIS ONE EVALUATION: CPT

## 2024-05-31 PROCEDURE — 6360000002 HC RX W HCPCS

## 2024-05-31 PROCEDURE — 6370000000 HC RX 637 (ALT 250 FOR IP)

## 2024-05-31 PROCEDURE — 6360000002 HC RX W HCPCS: Performed by: STUDENT IN AN ORGANIZED HEALTH CARE EDUCATION/TRAINING PROGRAM

## 2024-05-31 PROCEDURE — 2000000000 HC ICU R&B

## 2024-05-31 PROCEDURE — 85610 PROTHROMBIN TIME: CPT

## 2024-05-31 PROCEDURE — 83605 ASSAY OF LACTIC ACID: CPT

## 2024-05-31 PROCEDURE — 6370000000 HC RX 637 (ALT 250 FOR IP): Performed by: HOSPITALIST

## 2024-05-31 PROCEDURE — 85025 COMPLETE CBC W/AUTO DIFF WBC: CPT

## 2024-05-31 PROCEDURE — 2580000003 HC RX 258

## 2024-05-31 PROCEDURE — 2500000003 HC RX 250 WO HCPCS: Performed by: STUDENT IN AN ORGANIZED HEALTH CARE EDUCATION/TRAINING PROGRAM

## 2024-05-31 PROCEDURE — 3E033XZ INTRODUCTION OF VASOPRESSOR INTO PERIPHERAL VEIN, PERCUTANEOUS APPROACH: ICD-10-PCS | Performed by: FAMILY MEDICINE

## 2024-05-31 PROCEDURE — 80053 COMPREHEN METABOLIC PANEL: CPT

## 2024-05-31 PROCEDURE — 2500000003 HC RX 250 WO HCPCS

## 2024-05-31 PROCEDURE — 36415 COLL VENOUS BLD VENIPUNCTURE: CPT

## 2024-05-31 PROCEDURE — 94761 N-INVAS EAR/PLS OXIMETRY MLT: CPT

## 2024-05-31 PROCEDURE — 6370000000 HC RX 637 (ALT 250 FOR IP): Performed by: STUDENT IN AN ORGANIZED HEALTH CARE EDUCATION/TRAINING PROGRAM

## 2024-05-31 PROCEDURE — 2580000003 HC RX 258: Performed by: NURSE PRACTITIONER

## 2024-05-31 RX ORDER — SODIUM CHLORIDE, SODIUM LACTATE, CALCIUM CHLORIDE, MAGNESIUM CHLORIDE AND DEXTROSE 4.25; 538; 448; 18.3; 5.08 G/100ML; MG/100ML; MG/100ML; MG/100ML; MG/100ML
6000 INJECTION, SOLUTION INTRAPERITONEAL
Status: DISCONTINUED | OUTPATIENT
Start: 2024-05-31 | End: 2024-06-04

## 2024-05-31 RX ORDER — NOREPINEPHRINE BITARTRATE 0.06 MG/ML
1-100 INJECTION, SOLUTION INTRAVENOUS CONTINUOUS
Status: DISCONTINUED | OUTPATIENT
Start: 2024-05-31 | End: 2024-06-03

## 2024-05-31 RX ORDER — HYDROMORPHONE HYDROCHLORIDE 2 MG/ML
2 INJECTION, SOLUTION INTRAMUSCULAR; INTRAVENOUS; SUBCUTANEOUS EVERY 4 HOURS PRN
Status: DISCONTINUED | OUTPATIENT
Start: 2024-05-31 | End: 2024-06-01

## 2024-05-31 RX ORDER — HYDROMORPHONE HYDROCHLORIDE 1 MG/ML
1 INJECTION, SOLUTION INTRAMUSCULAR; INTRAVENOUS; SUBCUTANEOUS EVERY 4 HOURS PRN
Status: DISCONTINUED | OUTPATIENT
Start: 2024-05-31 | End: 2024-06-01

## 2024-05-31 RX ORDER — FLUDROCORTISONE ACETATE 0.1 MG/1
0.1 TABLET ORAL 2 TIMES DAILY
Status: DISCONTINUED | OUTPATIENT
Start: 2024-05-31 | End: 2024-06-04 | Stop reason: HOSPADM

## 2024-05-31 RX ORDER — POLYETHYLENE GLYCOL 3350 17 G/17G
17 POWDER, FOR SOLUTION ORAL DAILY
Status: DISCONTINUED | OUTPATIENT
Start: 2024-05-31 | End: 2024-06-04 | Stop reason: HOSPADM

## 2024-05-31 RX ADMIN — HYDROMORPHONE HYDROCHLORIDE 1 MG: 1 INJECTION, SOLUTION INTRAMUSCULAR; INTRAVENOUS; SUBCUTANEOUS at 18:13

## 2024-05-31 RX ADMIN — HYDROMORPHONE HYDROCHLORIDE 1 MG: 1 INJECTION, SOLUTION INTRAMUSCULAR; INTRAVENOUS; SUBCUTANEOUS at 00:02

## 2024-05-31 RX ADMIN — WATER 1000 MG: 1 INJECTION INTRAMUSCULAR; INTRAVENOUS; SUBCUTANEOUS at 09:29

## 2024-05-31 RX ADMIN — HYDROCORTISONE: 25 CREAM TOPICAL at 10:56

## 2024-05-31 RX ADMIN — ONDANSETRON 4 MG: 2 INJECTION INTRAMUSCULAR; INTRAVENOUS at 08:04

## 2024-05-31 RX ADMIN — SODIUM CHLORIDE, PRESERVATIVE FREE 10 ML: 5 INJECTION INTRAVENOUS at 00:26

## 2024-05-31 RX ADMIN — HYDROCORTISONE: 25 CREAM TOPICAL at 21:26

## 2024-05-31 RX ADMIN — ALLOPURINOL 100 MG: 100 TABLET ORAL at 09:28

## 2024-05-31 RX ADMIN — SODIUM CHLORIDE, SODIUM LACTATE, CALCIUM CHLORIDE, MAGNESIUM CHLORIDE AND DEXTROSE 6000 ML: 2.5; 538; 448; 18.3; 5.08 INJECTION, SOLUTION INTRAPERITONEAL at 21:00

## 2024-05-31 RX ADMIN — METRONIDAZOLE 500 MG: 500 INJECTION, SOLUTION INTRAVENOUS at 20:19

## 2024-05-31 RX ADMIN — ONDANSETRON 4 MG: 2 INJECTION INTRAMUSCULAR; INTRAVENOUS at 14:06

## 2024-05-31 RX ADMIN — ATORVASTATIN CALCIUM 80 MG: 20 TABLET, FILM COATED ORAL at 20:09

## 2024-05-31 RX ADMIN — MIDODRINE HYDROCHLORIDE 15 MG: 5 TABLET ORAL at 14:02

## 2024-05-31 RX ADMIN — METRONIDAZOLE 500 MG: 500 INJECTION, SOLUTION INTRAVENOUS at 11:12

## 2024-05-31 RX ADMIN — PANTOPRAZOLE SODIUM 40 MG: 40 TABLET, DELAYED RELEASE ORAL at 09:28

## 2024-05-31 RX ADMIN — HYDROMORPHONE HYDROCHLORIDE 2 MG: 2 INJECTION, SOLUTION INTRAMUSCULAR; INTRAVENOUS; SUBCUTANEOUS at 22:51

## 2024-05-31 RX ADMIN — MIDODRINE HYDROCHLORIDE 15 MG: 5 TABLET ORAL at 00:24

## 2024-05-31 RX ADMIN — SODIUM CHLORIDE, SODIUM LACTATE, CALCIUM CHLORIDE, MAGNESIUM CHLORIDE AND DEXTROSE 6000 ML: 4.25; 538; 448; 18.3; 5.08 INJECTION, SOLUTION INTRAPERITONEAL at 21:00

## 2024-05-31 RX ADMIN — HYDROMORPHONE HYDROCHLORIDE 1 MG: 1 INJECTION, SOLUTION INTRAMUSCULAR; INTRAVENOUS; SUBCUTANEOUS at 10:59

## 2024-05-31 RX ADMIN — MIDODRINE HYDROCHLORIDE 15 MG: 5 TABLET ORAL at 09:28

## 2024-05-31 RX ADMIN — METRONIDAZOLE 500 MG: 500 INJECTION, SOLUTION INTRAVENOUS at 04:47

## 2024-05-31 RX ADMIN — DOCUSATE SODIUM LIQUID 100 MG: 100 LIQUID ORAL at 20:10

## 2024-05-31 RX ADMIN — FOLIC ACID 1000 MCG: 1 TABLET ORAL at 09:28

## 2024-05-31 RX ADMIN — ATORVASTATIN CALCIUM 80 MG: 20 TABLET, FILM COATED ORAL at 00:24

## 2024-05-31 RX ADMIN — FLUDROCORTISONE ACETATE 0.1 MG: 0.1 TABLET ORAL at 11:09

## 2024-05-31 RX ADMIN — SODIUM CHLORIDE, PRESERVATIVE FREE 10 ML: 5 INJECTION INTRAVENOUS at 21:29

## 2024-05-31 RX ADMIN — SODIUM CHLORIDE 5 MCG/MIN: 9 INJECTION, SOLUTION INTRAVENOUS at 13:01

## 2024-05-31 RX ADMIN — ONDANSETRON 4 MG: 2 INJECTION INTRAMUSCULAR; INTRAVENOUS at 04:39

## 2024-05-31 RX ADMIN — GENTAMICIN SULFATE: 1 CREAM TOPICAL at 21:19

## 2024-05-31 RX ADMIN — HYDROMORPHONE HYDROCHLORIDE 1 MG: 1 INJECTION, SOLUTION INTRAMUSCULAR; INTRAVENOUS; SUBCUTANEOUS at 04:39

## 2024-05-31 RX ADMIN — MICONAZOLE NITRATE: 2 POWDER TOPICAL at 20:11

## 2024-05-31 RX ADMIN — POLYETHYLENE GLYCOL 3350 17 G: 17 POWDER, FOR SOLUTION ORAL at 09:29

## 2024-05-31 RX ADMIN — PANTOPRAZOLE SODIUM 40 MG: 40 TABLET, DELAYED RELEASE ORAL at 16:24

## 2024-05-31 RX ADMIN — MIDODRINE HYDROCHLORIDE 15 MG: 5 TABLET ORAL at 20:09

## 2024-05-31 RX ADMIN — ONDANSETRON 4 MG: 2 INJECTION INTRAMUSCULAR; INTRAVENOUS at 20:09

## 2024-05-31 ASSESSMENT — PAIN DESCRIPTION - DESCRIPTORS
DESCRIPTORS: ACHING
DESCRIPTORS: ACHING;CRAMPING;DISCOMFORT;GNAWING
DESCRIPTORS: ACHING
DESCRIPTORS: ACHING;CRAMPING
DESCRIPTORS: ACHING

## 2024-05-31 ASSESSMENT — PAIN DESCRIPTION - LOCATION
LOCATION: RECTUM;ABDOMEN
LOCATION: RECTUM
LOCATION: BUTTOCKS
LOCATION: ABDOMEN;RECTUM
LOCATION: BUTTOCKS
LOCATION: ABDOMEN;RECTUM
LOCATION: RECTUM
LOCATION: RECTUM

## 2024-05-31 ASSESSMENT — PAIN SCALES - GENERAL
PAINLEVEL_OUTOF10: 7
PAINLEVEL_OUTOF10: 3
PAINLEVEL_OUTOF10: 10
PAINLEVEL_OUTOF10: 8
PAINLEVEL_OUTOF10: 7
PAINLEVEL_OUTOF10: 3
PAINLEVEL_OUTOF10: 5
PAINLEVEL_OUTOF10: 9
PAINLEVEL_OUTOF10: 10
PAINLEVEL_OUTOF10: 9

## 2024-05-31 NOTE — FLOWSHEET NOTE
Patient Education provided: Procedural  Incapacitated Nurse taiwo. provided: na  Preferred Education method and Primary language: Verbal, English  Hospital associated wait time; reason: none  Hepatitis B Surface Ag   Date/Time Value Ref Range Status   03/28/2024 05:23 AM <0.10 Index Final     Hep B S Ab   Date/Time Value Ref Range Status   03/28/2024 05:23 AM <3.10 mIU/mL Final       05/31/24 0801   Vital Signs   BP (!) 91/59   Pulse 95   Respirations 21   SpO2 100 %   Pain Assessment   Pain Assessment None - Denies Pain   Technical Checks   ICEBOAT I;C;E;B;O;A;T        05/31/24 0801   Observations & Evaluations   Level of Consciousness 0   Oriented X 4   Heart Rhythm Regular   Respiratory Quality/Effort Unlabored   O2 Device Nasal cannula   Skin Color Other (comment)  (appropriate)   Skin Condition/Temp Dry;Warm   Abdomen Inspection Obese   Edema Right lower extremity;Left lower extremity   RLE Edema +2   LLE Edema +2   Peritoneal Dialysis Catheter Right lower abdomen   No placement date or time found.   Present on Admission/Arrival: Yes  Catheter Location: Right lower abdomen   Status Deaccessed   Site Condition Clean, dry, intact   Dressing Status Clean, dry & intact   Dressing Gauze   Date of Last Dressing Change 05/30/24   Dialysis Type Continuous cycling   Catheter Care Given Yes  (2 min alcavis scrub / soak)   Post-Treatment (Cycler)   Average Dwell Time (Hours:Minutes) 1:11   Lost Dwell Time (Hours:Minutes) 0:07  (ADDED)   Effluent Appearance Clear;Yellow   PD Output (mL) 377 mL  (ID 1886 +  - LF 2000)     Tx completed. In at bedside to disconnect from cycler. Prior to aseptic disconnection 2 min alcavis scrub / soak performed, followed by sterile mini cap application. Notes, labs, orders and code status reviewed. Cassette and bags discarded in biohazard location. No issues reported with treatment, no alarms. Education and pre/post report with primary RN.

## 2024-05-31 NOTE — ED NOTES
0710 Bedside report received on patient  0713 FP at bedside. Notified of continued hypotension (now 78/49(56)) by nightshift RN and oncoming RN. Attending at bedside. Told by MD to \"keep watching until dialysis is done\" at approximately 0900.  0830 Additional IV access obtained using US  0900 (approximate) PD completed by Ely FARAH  1020 FP resident notified of continued hypotension, nausea, and dizziness despite completion of PD and PO Midodrine. Plan to resume home Florinef and reevaluate.  1024 FP resident notified of repeat Lactic 5.0.  1233 FP resident notified of continued hypotension despite Florinef. Plan to start Levophed.  1301 BP 85/52(59). Levophed gtt started. See MAR for titration details.  1434 Attempted to call report, RN not available.

## 2024-05-31 NOTE — ED NOTES
Bedside shift change report completed with GAGAN Batres to include sbar, plan of care, admit status, peritoneal dialysis ongoing for 1-2 more hours, bp's discussed, hospitalist just at bedside aware of bp's

## 2024-05-31 NOTE — ED NOTES
Spoke with family practice dr. Jones regarding lasix admin with bp in 80's, she is changing order specific instructions for midodrine to give now and recheck bp and then give lasix later when bp is better

## 2024-05-31 NOTE — ED NOTES
Attempt to straight stick pt for repeat lactic acid; pt very abrasive about where she can be stuck and absolutely refusing the back of the hand, then attempted to just look at the wrist and forearm pt then upset states I would rather you stick my hand? Looked in the AC area with no result;  Labs drawn from iv site with taking off cap, rosina waste first

## 2024-05-31 NOTE — ED NOTES
Pt transferred to room next door with hospital bed for comfort and more space in the room for dialysis etc, pt gowned and placed back on full CM

## 2024-05-31 NOTE — FLOWSHEET NOTE
05/30/24 2340   Vitals   BP 96/63   Temp 98.2 °F (36.8 °C)   Temp Source Oral   Pulse (!) 113   Respirations 22   SpO2 98 %   Observations & Evaluations   Level of Consciousness 0   Oriented X 4   Heart Rhythm Regular   Respiratory Quality/Effort Dyspnea with exertion  (pt states this is her baseline)   O2 Device Nasal cannula   Bilateral Breath Sounds Diminished   Skin Color Other (comment)  (WDL)   Skin Condition/Temp Warm;Dry   Abdomen Inspection Obese;Soft   Edema Right lower extremity;Left lower extremity   RLE Edema +2   LLE Edema +2   Peritoneal Dialysis Catheter Right lower abdomen   No placement date or time found.   Present on Admission/Arrival: Yes  Catheter Location: Right lower abdomen   Status Accessed   Site Condition Clean, dry, intact  (tiny open/scarred spot around catheter site, pt states it has always been there)   Dressing Status New dressing applied;Clean, dry & intact   Dressing Gauze   Date of Last Dressing Change 05/30/24   Dialysis Type Continuous cycling   Exit Site Condition good   Catheter Care Given Yes  (2 minute alcavis scrub/soak)   Drainage Description Brown  (tiny spot of brown drainage on dressing)   Cycler   Verification of Prescription CCPD   Informed Consent  Yes  (chronic consent applies)   Total Volume Programmed 06398 mL   Therapy Time (Hours:Minutes) 9:00   Cycler Type Yen HomeChoice   Fill Volume 3000 mL   Last Fill Volume 0 mL   Dextrose Setting Same (Nonextraneal)   I Drain Alarm 0 mL   Number of Cycles 5   Bag Volume 6000 mL   Number of Bags Used 3   Dianeal Solution Other (Comment)  (2.5% dextrose in 6000mL)       Technical Checks   All Connections Secure Yes   ICEBOAT I;C;E;B;O;A;T     Primary RN SBAR: DEL Lazaro RN  Patient Education provided: PD catheter infection prevention  Preferred Education method and Primary language: verbal explanation, English  Hospital associated wait time; reason: 30 min- bag order had to be changed on arrival and bags obtained

## 2024-06-01 DIAGNOSIS — K62.89 PROCTITIS: ICD-10-CM

## 2024-06-01 DIAGNOSIS — E11.59 DM TYPE 2 CAUSING VASCULAR DISEASE (HCC): ICD-10-CM

## 2024-06-01 DIAGNOSIS — G47.33 OSA ON CPAP: ICD-10-CM

## 2024-06-01 DIAGNOSIS — I48.20 CHRONIC ATRIAL FIBRILLATION (HCC): ICD-10-CM

## 2024-06-01 DIAGNOSIS — I95.9 HYPOTENSION, UNSPECIFIED HYPOTENSION TYPE: ICD-10-CM

## 2024-06-01 DIAGNOSIS — K31.84 DIABETIC GASTROPARESIS (HCC): ICD-10-CM

## 2024-06-01 DIAGNOSIS — K21.9 GASTROESOPHAGEAL REFLUX DISEASE, UNSPECIFIED WHETHER ESOPHAGITIS PRESENT: ICD-10-CM

## 2024-06-01 DIAGNOSIS — E11.43 DIABETIC GASTROPARESIS (HCC): ICD-10-CM

## 2024-06-01 DIAGNOSIS — N18.6 ESRD ON PERITONEAL DIALYSIS (HCC): ICD-10-CM

## 2024-06-01 DIAGNOSIS — E87.1 HYPONATREMIA: ICD-10-CM

## 2024-06-01 DIAGNOSIS — I10 HYPERTENSION, UNSPECIFIED TYPE: ICD-10-CM

## 2024-06-01 DIAGNOSIS — I27.20 PULMONARY HYPERTENSION (HCC): ICD-10-CM

## 2024-06-01 DIAGNOSIS — I50.30 HEART FAILURE WITH PRESERVED EJECTION FRACTION, UNSPECIFIED HF CHRONICITY (HCC): Primary | ICD-10-CM

## 2024-06-01 DIAGNOSIS — J84.9 ILD (INTERSTITIAL LUNG DISEASE) (HCC): ICD-10-CM

## 2024-06-01 DIAGNOSIS — Z99.2 ESRD ON PERITONEAL DIALYSIS (HCC): ICD-10-CM

## 2024-06-01 DIAGNOSIS — Z74.09 LIMITED MOBILITY: ICD-10-CM

## 2024-06-01 LAB
ALBUMIN SERPL-MCNC: 1.5 G/DL (ref 3.5–5)
ALBUMIN/GLOB SERPL: 0.5 (ref 1.1–2.2)
ALP SERPL-CCNC: 186 U/L (ref 45–117)
ALT SERPL-CCNC: 8 U/L (ref 12–78)
ANION GAP SERPL CALC-SCNC: 9 MMOL/L (ref 5–15)
AST SERPL-CCNC: 14 U/L (ref 15–37)
BASOPHILS # BLD: 0 K/UL (ref 0–0.1)
BASOPHILS NFR BLD: 0 % (ref 0–1)
BILIRUB SERPL-MCNC: 0.4 MG/DL (ref 0.2–1)
BUN SERPL-MCNC: 13 MG/DL (ref 6–20)
BUN/CREAT SERPL: 3 (ref 12–20)
CALCIUM SERPL-MCNC: 8.5 MG/DL (ref 8.5–10.1)
CHLORIDE SERPL-SCNC: 99 MMOL/L (ref 97–108)
CO2 SERPL-SCNC: 26 MMOL/L (ref 21–32)
CREAT SERPL-MCNC: 5.13 MG/DL (ref 0.55–1.02)
DIFFERENTIAL METHOD BLD: ABNORMAL
EOSINOPHIL # BLD: 0.1 K/UL (ref 0–0.4)
EOSINOPHIL NFR BLD: 1 % (ref 0–7)
ERYTHROCYTE [DISTWIDTH] IN BLOOD BY AUTOMATED COUNT: 18.8 % (ref 11.5–14.5)
GLOBULIN SER CALC-MCNC: 3.3 G/DL (ref 2–4)
GLUCOSE BLD STRIP.AUTO-MCNC: 158 MG/DL (ref 65–117)
GLUCOSE BLD STRIP.AUTO-MCNC: 160 MG/DL (ref 65–117)
GLUCOSE BLD STRIP.AUTO-MCNC: 178 MG/DL (ref 65–117)
GLUCOSE SERPL-MCNC: 134 MG/DL (ref 65–100)
HBV SURFACE AG SER QL: <0.1 INDEX
HBV SURFACE AG SER QL: NEGATIVE
HCT VFR BLD AUTO: 35.1 % (ref 35–47)
HGB BLD-MCNC: 11.6 G/DL (ref 11.5–16)
IMM GRANULOCYTES # BLD AUTO: 0 K/UL (ref 0–0.04)
IMM GRANULOCYTES NFR BLD AUTO: 0 % (ref 0–0.5)
INR PPP: 5.6 (ref 0.9–1.1)
LYMPHOCYTES # BLD: 2.1 K/UL (ref 0.8–3.5)
LYMPHOCYTES NFR BLD: 15 % (ref 12–49)
MCH RBC QN AUTO: 31.6 PG (ref 26–34)
MCHC RBC AUTO-ENTMCNC: 33 G/DL (ref 30–36.5)
MCV RBC AUTO: 95.6 FL (ref 80–99)
MONOCYTES # BLD: 1 K/UL (ref 0–1)
MONOCYTES NFR BLD: 7 % (ref 5–13)
NEUTS SEG # BLD: 10.6 K/UL (ref 1.8–8)
NEUTS SEG NFR BLD: 77 % (ref 32–75)
NRBC # BLD: 0.16 K/UL (ref 0–0.01)
NRBC BLD-RTO: 1.2 PER 100 WBC
PLATELET # BLD AUTO: 150 K/UL (ref 150–400)
POTASSIUM SERPL-SCNC: 3.2 MMOL/L (ref 3.5–5.1)
PROT SERPL-MCNC: 4.8 G/DL (ref 6.4–8.2)
PROTHROMBIN TIME: 52.4 SEC (ref 9–11.1)
RBC # BLD AUTO: 3.67 M/UL (ref 3.8–5.2)
RBC MORPH BLD: ABNORMAL
SERVICE CMNT-IMP: ABNORMAL
SODIUM SERPL-SCNC: 134 MMOL/L (ref 136–145)
WBC # BLD AUTO: 13.8 K/UL (ref 3.6–11)

## 2024-06-01 PROCEDURE — 6360000002 HC RX W HCPCS: Performed by: HOSPITALIST

## 2024-06-01 PROCEDURE — 2060000000 HC ICU INTERMEDIATE R&B

## 2024-06-01 PROCEDURE — 2500000003 HC RX 250 WO HCPCS: Performed by: HOSPITALIST

## 2024-06-01 PROCEDURE — 94660 CPAP INITIATION&MGMT: CPT

## 2024-06-01 PROCEDURE — 6370000000 HC RX 637 (ALT 250 FOR IP): Performed by: STUDENT IN AN ORGANIZED HEALTH CARE EDUCATION/TRAINING PROGRAM

## 2024-06-01 PROCEDURE — 86704 HEP B CORE ANTIBODY TOTAL: CPT

## 2024-06-01 PROCEDURE — 6370000000 HC RX 637 (ALT 250 FOR IP)

## 2024-06-01 PROCEDURE — 36415 COLL VENOUS BLD VENIPUNCTURE: CPT

## 2024-06-01 PROCEDURE — 85025 COMPLETE CBC W/AUTO DIFF WBC: CPT

## 2024-06-01 PROCEDURE — 2580000003 HC RX 258

## 2024-06-01 PROCEDURE — 80053 COMPREHEN METABOLIC PANEL: CPT

## 2024-06-01 PROCEDURE — 87340 HEPATITIS B SURFACE AG IA: CPT

## 2024-06-01 PROCEDURE — 6370000000 HC RX 637 (ALT 250 FOR IP): Performed by: HOSPITALIST

## 2024-06-01 PROCEDURE — 6360000002 HC RX W HCPCS: Performed by: INTERNAL MEDICINE

## 2024-06-01 PROCEDURE — 94761 N-INVAS EAR/PLS OXIMETRY MLT: CPT

## 2024-06-01 PROCEDURE — 82962 GLUCOSE BLOOD TEST: CPT

## 2024-06-01 PROCEDURE — 86706 HEP B SURFACE ANTIBODY: CPT

## 2024-06-01 PROCEDURE — 6360000002 HC RX W HCPCS

## 2024-06-01 PROCEDURE — 2580000003 HC RX 258: Performed by: INTERNAL MEDICINE

## 2024-06-01 PROCEDURE — 85610 PROTHROMBIN TIME: CPT

## 2024-06-01 PROCEDURE — 2580000003 HC RX 258: Performed by: NURSE PRACTITIONER

## 2024-06-01 PROCEDURE — 86705 HEP B CORE ANTIBODY IGM: CPT

## 2024-06-01 PROCEDURE — 6360000002 HC RX W HCPCS: Performed by: STUDENT IN AN ORGANIZED HEALTH CARE EDUCATION/TRAINING PROGRAM

## 2024-06-01 RX ORDER — INSULIN LISPRO 100 [IU]/ML
0-4 INJECTION, SOLUTION INTRAVENOUS; SUBCUTANEOUS
Status: DISCONTINUED | OUTPATIENT
Start: 2024-06-01 | End: 2024-06-04

## 2024-06-01 RX ORDER — HYDROMORPHONE HYDROCHLORIDE 2 MG/ML
2 INJECTION, SOLUTION INTRAMUSCULAR; INTRAVENOUS; SUBCUTANEOUS EVERY 4 HOURS PRN
Status: DISCONTINUED | OUTPATIENT
Start: 2024-06-01 | End: 2024-06-02

## 2024-06-01 RX ORDER — WARFARIN SODIUM 1 MG/1
0.5 TABLET ORAL
Status: COMPLETED | OUTPATIENT
Start: 2024-06-01 | End: 2024-06-01

## 2024-06-01 RX ORDER — HYDROMORPHONE HYDROCHLORIDE 1 MG/ML
1 INJECTION, SOLUTION INTRAMUSCULAR; INTRAVENOUS; SUBCUTANEOUS EVERY 4 HOURS PRN
Status: DISCONTINUED | OUTPATIENT
Start: 2024-06-01 | End: 2024-06-02

## 2024-06-01 RX ORDER — DIPHENHYDRAMINE HYDROCHLORIDE 50 MG/ML
25 INJECTION INTRAMUSCULAR; INTRAVENOUS ONCE
Status: COMPLETED | OUTPATIENT
Start: 2024-06-01 | End: 2024-06-01

## 2024-06-01 RX ORDER — DEXTROSE MONOHYDRATE 100 MG/ML
INJECTION, SOLUTION INTRAVENOUS CONTINUOUS PRN
Status: DISCONTINUED | OUTPATIENT
Start: 2024-06-01 | End: 2024-06-04 | Stop reason: HOSPADM

## 2024-06-01 RX ORDER — INSULIN LISPRO 100 [IU]/ML
0-4 INJECTION, SOLUTION INTRAVENOUS; SUBCUTANEOUS NIGHTLY
Status: DISCONTINUED | OUTPATIENT
Start: 2024-06-01 | End: 2024-06-04

## 2024-06-01 RX ADMIN — HYDROCORTISONE SODIUM SUCCINATE 50 MG: 100 INJECTION, POWDER, FOR SOLUTION INTRAMUSCULAR; INTRAVENOUS at 23:20

## 2024-06-01 RX ADMIN — SODIUM CHLORIDE, PRESERVATIVE FREE 10 ML: 5 INJECTION INTRAVENOUS at 21:46

## 2024-06-01 RX ADMIN — PANTOPRAZOLE SODIUM 40 MG: 40 TABLET, DELAYED RELEASE ORAL at 06:41

## 2024-06-01 RX ADMIN — HYDROCORTISONE: 25 CREAM TOPICAL at 21:43

## 2024-06-01 RX ADMIN — MIDODRINE HYDROCHLORIDE 15 MG: 5 TABLET ORAL at 21:42

## 2024-06-01 RX ADMIN — MIDODRINE HYDROCHLORIDE 15 MG: 5 TABLET ORAL at 12:38

## 2024-06-01 RX ADMIN — DOCUSATE SODIUM LIQUID 100 MG: 100 LIQUID ORAL at 21:36

## 2024-06-01 RX ADMIN — WARFARIN SODIUM 0.5 MG: 1 TABLET ORAL at 17:42

## 2024-06-01 RX ADMIN — WATER 1000 MG: 1 INJECTION INTRAMUSCULAR; INTRAVENOUS; SUBCUTANEOUS at 08:07

## 2024-06-01 RX ADMIN — HYDROCORTISONE: 25 CREAM TOPICAL at 08:10

## 2024-06-01 RX ADMIN — ONDANSETRON 4 MG: 2 INJECTION INTRAMUSCULAR; INTRAVENOUS at 22:55

## 2024-06-01 RX ADMIN — FLUDROCORTISONE ACETATE 0.1 MG: 0.1 TABLET ORAL at 21:36

## 2024-06-01 RX ADMIN — HYDROCORTISONE SODIUM SUCCINATE 50 MG: 100 INJECTION, POWDER, FOR SOLUTION INTRAMUSCULAR; INTRAVENOUS at 08:08

## 2024-06-01 RX ADMIN — SODIUM CHLORIDE, SODIUM LACTATE, CALCIUM CHLORIDE, MAGNESIUM CHLORIDE AND DEXTROSE 6000 ML: 4.25; 538; 448; 18.3; 5.08 INJECTION, SOLUTION INTRAPERITONEAL at 20:28

## 2024-06-01 RX ADMIN — PANTOPRAZOLE SODIUM 40 MG: 40 TABLET, DELAYED RELEASE ORAL at 15:14

## 2024-06-01 RX ADMIN — METRONIDAZOLE 500 MG: 500 INJECTION, SOLUTION INTRAVENOUS at 12:40

## 2024-06-01 RX ADMIN — ATORVASTATIN CALCIUM 80 MG: 20 TABLET, FILM COATED ORAL at 21:37

## 2024-06-01 RX ADMIN — SODIUM CHLORIDE, PRESERVATIVE FREE 10 ML: 5 INJECTION INTRAVENOUS at 08:09

## 2024-06-01 RX ADMIN — FOLIC ACID 1000 MCG: 1 TABLET ORAL at 08:09

## 2024-06-01 RX ADMIN — ALLOPURINOL 100 MG: 100 TABLET ORAL at 08:09

## 2024-06-01 RX ADMIN — FLUDROCORTISONE ACETATE 0.1 MG: 0.1 TABLET ORAL at 08:09

## 2024-06-01 RX ADMIN — METRONIDAZOLE 500 MG: 500 INJECTION, SOLUTION INTRAVENOUS at 03:48

## 2024-06-01 RX ADMIN — METRONIDAZOLE 500 MG: 500 INJECTION, SOLUTION INTRAVENOUS at 21:42

## 2024-06-01 RX ADMIN — POLYETHYLENE GLYCOL 3350 17 G: 17 POWDER, FOR SOLUTION ORAL at 08:05

## 2024-06-01 RX ADMIN — MIDODRINE HYDROCHLORIDE 15 MG: 5 TABLET ORAL at 08:09

## 2024-06-01 RX ADMIN — DIPHENHYDRAMINE HYDROCHLORIDE 25 MG: 50 INJECTION INTRAMUSCULAR; INTRAVENOUS at 08:45

## 2024-06-01 RX ADMIN — HYDROCORTISONE SODIUM SUCCINATE 50 MG: 100 INJECTION, POWDER, FOR SOLUTION INTRAMUSCULAR; INTRAVENOUS at 15:14

## 2024-06-01 RX ADMIN — DOCUSATE SODIUM LIQUID 100 MG: 100 LIQUID ORAL at 08:06

## 2024-06-01 RX ADMIN — HYDROMORPHONE HYDROCHLORIDE 1 MG: 1 INJECTION, SOLUTION INTRAMUSCULAR; INTRAVENOUS; SUBCUTANEOUS at 23:37

## 2024-06-01 RX ADMIN — MICONAZOLE NITRATE: 2 POWDER TOPICAL at 08:10

## 2024-06-01 RX ADMIN — SODIUM CHLORIDE, SODIUM LACTATE, CALCIUM CHLORIDE, MAGNESIUM CHLORIDE AND DEXTROSE 6000 ML: 2.5; 538; 448; 18.3; 5.08 INJECTION, SOLUTION INTRAPERITONEAL at 20:30

## 2024-06-01 RX ADMIN — ONDANSETRON 4 MG: 2 INJECTION INTRAMUSCULAR; INTRAVENOUS at 12:46

## 2024-06-01 RX ADMIN — SODIUM CHLORIDE, SODIUM LACTATE, CALCIUM CHLORIDE, MAGNESIUM CHLORIDE AND DEXTROSE 6000 ML: 2.5; 538; 448; 18.3; 5.08 INJECTION, SOLUTION INTRAPERITONEAL at 20:28

## 2024-06-01 RX ADMIN — HYDROMORPHONE HYDROCHLORIDE 2 MG: 2 INJECTION, SOLUTION INTRAMUSCULAR; INTRAVENOUS; SUBCUTANEOUS at 08:08

## 2024-06-01 RX ADMIN — ONDANSETRON 4 MG: 2 INJECTION INTRAMUSCULAR; INTRAVENOUS at 03:44

## 2024-06-01 RX ADMIN — MICONAZOLE NITRATE: 2 POWDER TOPICAL at 21:42

## 2024-06-01 ASSESSMENT — PAIN SCALES - GENERAL
PAINLEVEL_OUTOF10: 9
PAINLEVEL_OUTOF10: 0
PAINLEVEL_OUTOF10: 9
PAINLEVEL_OUTOF10: 10

## 2024-06-01 ASSESSMENT — PAIN - FUNCTIONAL ASSESSMENT: PAIN_FUNCTIONAL_ASSESSMENT: ACTIVITIES ARE NOT PREVENTED

## 2024-06-01 ASSESSMENT — PAIN DESCRIPTION - DESCRIPTORS
DESCRIPTORS: ACHING
DESCRIPTORS: SORE;CRAMPING
DESCRIPTORS: ACHING
DESCRIPTORS: SORE;CRAMPING

## 2024-06-01 ASSESSMENT — PAIN DESCRIPTION - LOCATION
LOCATION: BUTTOCKS;ABDOMEN
LOCATION: GENERALIZED
LOCATION: ABDOMEN;BUTTOCKS
LOCATION: GENERALIZED

## 2024-06-01 ASSESSMENT — PAIN DESCRIPTION - PAIN TYPE: TYPE: ACUTE PAIN

## 2024-06-01 ASSESSMENT — PAIN DESCRIPTION - ORIENTATION: ORIENTATION: POSTERIOR;LEFT

## 2024-06-01 NOTE — FLOWSHEET NOTE
.Primary RN SBAR: DEBORAH Quintana, RN  Patient Education provided: Procedural  Incapacitated Nurse [facility]. provided: na  Preferred Education method and Primary language: Verbal, english  Hospital associated wait time; reason: none  Hepatitis B Surface Ag   Date/Time Value Ref Range Status   03/28/2024 05:23 AM <0.10 Index Final     Hep B S Ab   Date/Time Value Ref Range Status   03/28/2024 05:23 AM <3.10 mIU/mL Final       06/01/24 0745   Vital Signs   /62   Pulse 79   Respirations 13   SpO2 98 %   Pain Assessment   Pain Assessment None - Denies Pain   Observations & Evaluations   Level of Consciousness 0   Oriented X 4   Heart Rhythm Regular   Respiratory Quality/Effort Unlabored   O2 Device Nasal cannula   Skin Color Other (comment)  (appropriate)   Skin Condition/Temp Dry;Warm   Abdomen Inspection Soft;Obese   Edema Left lower extremity;Right lower extremity   RLE Edema +3   LLE Edema +3   Technical Checks   ICEBOAT I;C;E;B;O;A;T        06/01/24 0745   Peritoneal Dialysis Catheter Right lower abdomen   No placement date or time found.   Present on Admission/Arrival: Yes  Catheter Location: Right lower abdomen   Status Deaccessed   Site Condition Clean, dry, intact   Dressing Status Clean, dry & intact   Dressing Gauze   Date of Last Dressing Change 05/31/24   Dialysis Type Continuous cycling   Catheter Care Given Yes  (2 min alcavis scrub / soak)   Post-Treatment (Cycler)   Average Dwell Time (Hours:Minutes) 1:07   Lost Dwell Time (Hours:Minutes) 0:12  (no alarms)   Effluent Appearance Clear;Yellow   PD Output (mL) 1059 mL  (ID 24 + UF 1035 - LF 0)     In at bedside to disconnect from cycler. Tx completed. No reported issues overnight / no alarms. Labs, notes, orders and code status reviewed. Cassette and bags discarded per policy.

## 2024-06-01 NOTE — FLOWSHEET NOTE
05/31/24 2045   Vitals   /62   Temp 97.8 °F (36.6 °C)   Pulse 60   Respirations 14   SpO2 100 %   Observations & Evaluations   Level of Consciousness 0   Oriented X 4   Heart Rhythm Regular   Respiratory Quality/Effort Unlabored   O2 Device None (Room air)   Skin Color Other (comment)  (Appropriate for ethnicity)   Skin Condition/Temp Warm;Dry   Abdomen Inspection Obese;Soft   Edema Right lower extremity;Left lower extremity   RLE Edema +3;Pitting   LLE Edema +3;Pitting   Peritoneal Dialysis Catheter Right lower abdomen   No placement date or time found.   Present on Admission/Arrival: Yes  Catheter Location: Right lower abdomen   Status Accessed   Site Condition Clean, dry, intact   Dressing Status New dressing applied  (Exit site cleansed with Exsept.  Gentamicin 1% cream applied and dressed with dry/sterile gauze.)   Dressing Gauze   Date of Last Dressing Change 05/31/24   Dialysis Type Continuous cycling   Exit Site Condition good   Catheter Care Given Yes  (Two minute Alcavis scrub/soak performed prior to connection.)   Cycler   Verification of Prescription CCPD   Informed Consent  Yes   Total Volume Programmed 24162 mL   Therapy Time (Hours:Minutes) 9:00   Cycler Type Yen HomeChoice   Fill Volume 3000 mL   Last Fill Volume 0 mL   Dextrose Setting Same (Nonextraneal)   Number of Cycles 5   Bag Volume 6000 mL   Number of Bags Used 3   Dianeal Solution Other (Comment)  (4.25% Dextrose in 6000 ml (bag 1), 2.5% Dextrose in 6000 ml (bags 2 and 3))        05/31/24 2045   Technical Checks   ICEBOAT I;C;E;B;O;A;T     Primary RN SBAR: Sisi Rinaldi RN  Patient Education provided: Catheter Care  Incapacitated Nurse edu. provided: Sisi Rinaldi RN  Preferred Education method and Primary language: Explanation/English  Hospital associated wait time; reason: N/A    Hepatitis B Surface Ag   Date/Time Value Ref Range Status   03/28/2024 05:23 AM <0.10 Index Final     Hep B S Ab   Date/Time Value Ref Range

## 2024-06-02 DIAGNOSIS — G47.33 OSA ON CPAP: ICD-10-CM

## 2024-06-02 DIAGNOSIS — Z79.01 WARFARIN ANTICOAGULATION: ICD-10-CM

## 2024-06-02 DIAGNOSIS — K21.9 GASTROESOPHAGEAL REFLUX DISEASE, UNSPECIFIED WHETHER ESOPHAGITIS PRESENT: ICD-10-CM

## 2024-06-02 DIAGNOSIS — I48.20 CHRONIC ATRIAL FIBRILLATION (HCC): ICD-10-CM

## 2024-06-02 DIAGNOSIS — F11.20 OPIOID DEPENDENCE WITH CURRENT USE (HCC): ICD-10-CM

## 2024-06-02 DIAGNOSIS — N18.6 ESRD ON PERITONEAL DIALYSIS (HCC): ICD-10-CM

## 2024-06-02 DIAGNOSIS — J44.9 CHRONIC OBSTRUCTIVE PULMONARY DISEASE, UNSPECIFIED COPD TYPE (HCC): ICD-10-CM

## 2024-06-02 DIAGNOSIS — Z74.09 LIMITED MOBILITY: ICD-10-CM

## 2024-06-02 DIAGNOSIS — J84.9 ILD (INTERSTITIAL LUNG DISEASE) (HCC): ICD-10-CM

## 2024-06-02 DIAGNOSIS — R18.8 OTHER ASCITES: ICD-10-CM

## 2024-06-02 DIAGNOSIS — I95.9 HYPOTENSION, UNSPECIFIED HYPOTENSION TYPE: ICD-10-CM

## 2024-06-02 DIAGNOSIS — I27.20 PULMONARY HYPERTENSION (HCC): ICD-10-CM

## 2024-06-02 DIAGNOSIS — Z99.2 ESRD ON PERITONEAL DIALYSIS (HCC): ICD-10-CM

## 2024-06-02 DIAGNOSIS — E27.40 ADRENAL INSUFFICIENCY (HCC): ICD-10-CM

## 2024-06-02 DIAGNOSIS — E11.59 DM TYPE 2 CAUSING VASCULAR DISEASE (HCC): ICD-10-CM

## 2024-06-02 DIAGNOSIS — I50.30 HEART FAILURE WITH PRESERVED EJECTION FRACTION, UNSPECIFIED HF CHRONICITY (HCC): Primary | ICD-10-CM

## 2024-06-02 DIAGNOSIS — R79.1 SUPRATHERAPEUTIC INR: ICD-10-CM

## 2024-06-02 LAB
ALBUMIN SERPL-MCNC: 1.6 G/DL (ref 3.5–5)
ALBUMIN/GLOB SERPL: 0.5 (ref 1.1–2.2)
ALP SERPL-CCNC: 206 U/L (ref 45–117)
ALT SERPL-CCNC: 12 U/L (ref 12–78)
ANION GAP SERPL CALC-SCNC: 10 MMOL/L (ref 5–15)
ANION GAP SERPL CALC-SCNC: 8 MMOL/L (ref 5–15)
AST SERPL-CCNC: 19 U/L (ref 15–37)
BASOPHILS # BLD: 0 K/UL (ref 0–0.1)
BASOPHILS NFR BLD: 0 % (ref 0–1)
BILIRUB SERPL-MCNC: 0.4 MG/DL (ref 0.2–1)
BUN SERPL-MCNC: 14 MG/DL (ref 6–20)
BUN SERPL-MCNC: 14 MG/DL (ref 6–20)
BUN/CREAT SERPL: 3 (ref 12–20)
BUN/CREAT SERPL: 3 (ref 12–20)
CALCIUM SERPL-MCNC: 8.5 MG/DL (ref 8.5–10.1)
CALCIUM SERPL-MCNC: 8.5 MG/DL (ref 8.5–10.1)
CHLORIDE SERPL-SCNC: 96 MMOL/L (ref 97–108)
CHLORIDE SERPL-SCNC: 97 MMOL/L (ref 97–108)
CO2 SERPL-SCNC: 27 MMOL/L (ref 21–32)
CO2 SERPL-SCNC: 30 MMOL/L (ref 21–32)
CREAT SERPL-MCNC: 5.26 MG/DL (ref 0.55–1.02)
CREAT SERPL-MCNC: 5.29 MG/DL (ref 0.55–1.02)
DIFFERENTIAL METHOD BLD: ABNORMAL
EOSINOPHIL # BLD: 0 K/UL (ref 0–0.4)
EOSINOPHIL NFR BLD: 0 % (ref 0–7)
ERYTHROCYTE [DISTWIDTH] IN BLOOD BY AUTOMATED COUNT: 18.4 % (ref 11.5–14.5)
GLOBULIN SER CALC-MCNC: 3.5 G/DL (ref 2–4)
GLUCOSE BLD STRIP.AUTO-MCNC: 140 MG/DL (ref 65–117)
GLUCOSE BLD STRIP.AUTO-MCNC: 165 MG/DL (ref 65–117)
GLUCOSE BLD STRIP.AUTO-MCNC: 180 MG/DL (ref 65–117)
GLUCOSE BLD STRIP.AUTO-MCNC: 209 MG/DL (ref 65–117)
GLUCOSE SERPL-MCNC: 171 MG/DL (ref 65–100)
GLUCOSE SERPL-MCNC: 192 MG/DL (ref 65–100)
HBV SURFACE AB SER QL: NONREACTIVE
HBV SURFACE AB SER-ACNC: <3.1 MIU/ML
HCT VFR BLD AUTO: 34.9 % (ref 35–47)
HGB BLD-MCNC: 11.3 G/DL (ref 11.5–16)
IMM GRANULOCYTES # BLD AUTO: 0.1 K/UL (ref 0–0.04)
IMM GRANULOCYTES NFR BLD AUTO: 1 % (ref 0–0.5)
INR PPP: 4.8 (ref 0.9–1.1)
LYMPHOCYTES # BLD: 1.1 K/UL (ref 0.8–3.5)
LYMPHOCYTES NFR BLD: 11 % (ref 12–49)
MAGNESIUM SERPL-MCNC: 1.1 MG/DL (ref 1.6–2.4)
MCH RBC QN AUTO: 31.5 PG (ref 26–34)
MCHC RBC AUTO-ENTMCNC: 32.4 G/DL (ref 30–36.5)
MCV RBC AUTO: 97.2 FL (ref 80–99)
MONOCYTES # BLD: 0.3 K/UL (ref 0–1)
MONOCYTES NFR BLD: 3 % (ref 5–13)
NEUTS SEG # BLD: 8.6 K/UL (ref 1.8–8)
NEUTS SEG NFR BLD: 85 % (ref 32–75)
NRBC # BLD: 0.11 K/UL (ref 0–0.01)
NRBC BLD-RTO: 1.1 PER 100 WBC
PLATELET # BLD AUTO: 189 K/UL (ref 150–400)
PMV BLD AUTO: 11.9 FL (ref 8.9–12.9)
POTASSIUM SERPL-SCNC: 3 MMOL/L (ref 3.5–5.1)
POTASSIUM SERPL-SCNC: 3.6 MMOL/L (ref 3.5–5.1)
PROT SERPL-MCNC: 5.1 G/DL (ref 6.4–8.2)
PROTHROMBIN TIME: 45.3 SEC (ref 9–11.1)
RBC # BLD AUTO: 3.59 M/UL (ref 3.8–5.2)
SERVICE CMNT-IMP: ABNORMAL
SODIUM SERPL-SCNC: 134 MMOL/L (ref 136–145)
SODIUM SERPL-SCNC: 134 MMOL/L (ref 136–145)
WBC # BLD AUTO: 10.1 K/UL (ref 3.6–11)

## 2024-06-02 PROCEDURE — 94761 N-INVAS EAR/PLS OXIMETRY MLT: CPT

## 2024-06-02 PROCEDURE — 6370000000 HC RX 637 (ALT 250 FOR IP)

## 2024-06-02 PROCEDURE — 2580000003 HC RX 258: Performed by: INTERNAL MEDICINE

## 2024-06-02 PROCEDURE — 6360000002 HC RX W HCPCS

## 2024-06-02 PROCEDURE — 82962 GLUCOSE BLOOD TEST: CPT

## 2024-06-02 PROCEDURE — 85025 COMPLETE CBC W/AUTO DIFF WBC: CPT

## 2024-06-02 PROCEDURE — 2580000003 HC RX 258

## 2024-06-02 PROCEDURE — 6360000002 HC RX W HCPCS: Performed by: HOSPITALIST

## 2024-06-02 PROCEDURE — 2580000003 HC RX 258: Performed by: NURSE PRACTITIONER

## 2024-06-02 PROCEDURE — 2500000003 HC RX 250 WO HCPCS

## 2024-06-02 PROCEDURE — 6370000000 HC RX 637 (ALT 250 FOR IP): Performed by: HOSPITALIST

## 2024-06-02 PROCEDURE — 6360000002 HC RX W HCPCS: Performed by: STUDENT IN AN ORGANIZED HEALTH CARE EDUCATION/TRAINING PROGRAM

## 2024-06-02 PROCEDURE — 36415 COLL VENOUS BLD VENIPUNCTURE: CPT

## 2024-06-02 PROCEDURE — 2700000000 HC OXYGEN THERAPY PER DAY

## 2024-06-02 PROCEDURE — 85610 PROTHROMBIN TIME: CPT

## 2024-06-02 PROCEDURE — 80053 COMPREHEN METABOLIC PANEL: CPT

## 2024-06-02 PROCEDURE — 90945 DIALYSIS ONE EVALUATION: CPT

## 2024-06-02 PROCEDURE — 6370000000 HC RX 637 (ALT 250 FOR IP): Performed by: STUDENT IN AN ORGANIZED HEALTH CARE EDUCATION/TRAINING PROGRAM

## 2024-06-02 PROCEDURE — 94660 CPAP INITIATION&MGMT: CPT

## 2024-06-02 PROCEDURE — 2060000000 HC ICU INTERMEDIATE R&B

## 2024-06-02 PROCEDURE — 83735 ASSAY OF MAGNESIUM: CPT

## 2024-06-02 RX ORDER — CALCIUM CARBONATE 500 MG/1
500 TABLET, CHEWABLE ORAL 3 TIMES DAILY PRN
Status: DISCONTINUED | OUTPATIENT
Start: 2024-06-02 | End: 2024-06-04

## 2024-06-02 RX ORDER — MAGNESIUM SULFATE HEPTAHYDRATE 40 MG/ML
2000 INJECTION, SOLUTION INTRAVENOUS
Status: COMPLETED | OUTPATIENT
Start: 2024-06-02 | End: 2024-06-02

## 2024-06-02 RX ORDER — POTASSIUM CHLORIDE 7.45 MG/ML
10 INJECTION INTRAVENOUS
Status: DISCONTINUED | OUTPATIENT
Start: 2024-06-02 | End: 2024-06-02

## 2024-06-02 RX ORDER — METRONIDAZOLE 250 MG/1
500 TABLET ORAL EVERY 8 HOURS SCHEDULED
Status: DISCONTINUED | OUTPATIENT
Start: 2024-06-02 | End: 2024-06-04 | Stop reason: HOSPADM

## 2024-06-02 RX ORDER — HYDROMORPHONE HYDROCHLORIDE 1 MG/ML
1 INJECTION, SOLUTION INTRAMUSCULAR; INTRAVENOUS; SUBCUTANEOUS EVERY 4 HOURS PRN
Status: DISCONTINUED | OUTPATIENT
Start: 2024-06-02 | End: 2024-06-02

## 2024-06-02 RX ORDER — POTASSIUM CHLORIDE 7.45 MG/ML
10 INJECTION INTRAVENOUS
Status: DISPENSED | OUTPATIENT
Start: 2024-06-02 | End: 2024-06-02

## 2024-06-02 RX ADMIN — MICONAZOLE NITRATE: 2 POWDER TOPICAL at 21:38

## 2024-06-02 RX ADMIN — FOLIC ACID 1000 MCG: 1 TABLET ORAL at 09:05

## 2024-06-02 RX ADMIN — WATER 1000 MG: 1 INJECTION INTRAMUSCULAR; INTRAVENOUS; SUBCUTANEOUS at 09:15

## 2024-06-02 RX ADMIN — FLUDROCORTISONE ACETATE 0.1 MG: 0.1 TABLET ORAL at 09:05

## 2024-06-02 RX ADMIN — ONDANSETRON 4 MG: 2 INJECTION INTRAMUSCULAR; INTRAVENOUS at 16:36

## 2024-06-02 RX ADMIN — GENTAMICIN SULFATE: 1 CREAM TOPICAL at 16:20

## 2024-06-02 RX ADMIN — METRONIDAZOLE 500 MG: 500 INJECTION, SOLUTION INTRAVENOUS at 07:05

## 2024-06-02 RX ADMIN — MAGNESIUM SULFATE HEPTAHYDRATE 2000 MG: 40 INJECTION, SOLUTION INTRAVENOUS at 11:16

## 2024-06-02 RX ADMIN — ACETAMINOPHEN 650 MG: 325 TABLET ORAL at 15:13

## 2024-06-02 RX ADMIN — HYDROCORTISONE: 25 CREAM TOPICAL at 21:37

## 2024-06-02 RX ADMIN — HYDROCORTISONE SODIUM SUCCINATE 25 MG: 100 INJECTION, POWDER, FOR SOLUTION INTRAMUSCULAR; INTRAVENOUS at 17:25

## 2024-06-02 RX ADMIN — PANTOPRAZOLE SODIUM 40 MG: 40 TABLET, DELAYED RELEASE ORAL at 07:01

## 2024-06-02 RX ADMIN — SODIUM CHLORIDE, PRESERVATIVE FREE 10 ML: 5 INJECTION INTRAVENOUS at 21:36

## 2024-06-02 RX ADMIN — POTASSIUM CHLORIDE 10 MEQ: 7.46 INJECTION, SOLUTION INTRAVENOUS at 13:53

## 2024-06-02 RX ADMIN — SODIUM CHLORIDE, SODIUM LACTATE, CALCIUM CHLORIDE, MAGNESIUM CHLORIDE AND DEXTROSE 6000 ML: 4.25; 538; 448; 18.3; 5.08 INJECTION, SOLUTION INTRAPERITONEAL at 16:18

## 2024-06-02 RX ADMIN — METRONIDAZOLE 500 MG: 500 INJECTION, SOLUTION INTRAVENOUS at 14:33

## 2024-06-02 RX ADMIN — MAGNESIUM SULFATE HEPTAHYDRATE 2000 MG: 40 INJECTION, SOLUTION INTRAVENOUS at 09:17

## 2024-06-02 RX ADMIN — DOCUSATE SODIUM LIQUID 100 MG: 100 LIQUID ORAL at 09:04

## 2024-06-02 RX ADMIN — SODIUM CHLORIDE, PRESERVATIVE FREE 10 ML: 5 INJECTION INTRAVENOUS at 09:10

## 2024-06-02 RX ADMIN — SODIUM CHLORIDE, SODIUM LACTATE, CALCIUM CHLORIDE, MAGNESIUM CHLORIDE AND DEXTROSE 6000 ML: 2.5; 538; 448; 18.3; 5.08 INJECTION, SOLUTION INTRAPERITONEAL at 16:19

## 2024-06-02 RX ADMIN — MICONAZOLE NITRATE: 2 POWDER TOPICAL at 09:12

## 2024-06-02 RX ADMIN — HYDROCORTISONE: 25 CREAM TOPICAL at 09:13

## 2024-06-02 RX ADMIN — POTASSIUM CHLORIDE 10 MEQ: 7.46 INJECTION, SOLUTION INTRAVENOUS at 15:03

## 2024-06-02 RX ADMIN — PANTOPRAZOLE SODIUM 40 MG: 40 TABLET, DELAYED RELEASE ORAL at 15:03

## 2024-06-02 RX ADMIN — HYDROCORTISONE SODIUM SUCCINATE 50 MG: 100 INJECTION, POWDER, FOR SOLUTION INTRAMUSCULAR; INTRAVENOUS at 09:05

## 2024-06-02 RX ADMIN — SODIUM CHLORIDE: 9 INJECTION, SOLUTION INTRAVENOUS at 09:16

## 2024-06-02 RX ADMIN — HYDROMORPHONE HYDROCHLORIDE 2 MG: 2 INJECTION, SOLUTION INTRAMUSCULAR; INTRAVENOUS; SUBCUTANEOUS at 04:59

## 2024-06-02 RX ADMIN — ONDANSETRON 4 MG: 2 INJECTION INTRAMUSCULAR; INTRAVENOUS at 05:00

## 2024-06-02 RX ADMIN — POTASSIUM CHLORIDE 10 MEQ: 7.46 INJECTION, SOLUTION INTRAVENOUS at 16:33

## 2024-06-02 RX ADMIN — MIDODRINE HYDROCHLORIDE 15 MG: 5 TABLET ORAL at 14:26

## 2024-06-02 RX ADMIN — MIDODRINE HYDROCHLORIDE 15 MG: 5 TABLET ORAL at 09:05

## 2024-06-02 RX ADMIN — ONDANSETRON 4 MG: 2 INJECTION INTRAMUSCULAR; INTRAVENOUS at 21:36

## 2024-06-02 RX ADMIN — POTASSIUM BICARBONATE 20 MEQ: 782 TABLET, EFFERVESCENT ORAL at 08:00

## 2024-06-02 RX ADMIN — ALLOPURINOL 100 MG: 100 TABLET ORAL at 09:05

## 2024-06-02 RX ADMIN — POLYETHYLENE GLYCOL 3350 17 G: 17 POWDER, FOR SOLUTION ORAL at 09:04

## 2024-06-02 ASSESSMENT — PAIN DESCRIPTION - LOCATION
LOCATION: ABDOMEN

## 2024-06-02 ASSESSMENT — PAIN SCALES - GENERAL
PAINLEVEL_OUTOF10: 3
PAINLEVEL_OUTOF10: 5
PAINLEVEL_OUTOF10: 8
PAINLEVEL_OUTOF10: 5
PAINLEVEL_OUTOF10: 5
PAINLEVEL_OUTOF10: 8

## 2024-06-02 ASSESSMENT — PAIN DESCRIPTION - ORIENTATION
ORIENTATION: LEFT
ORIENTATION: LEFT

## 2024-06-02 ASSESSMENT — PAIN DESCRIPTION - DESCRIPTORS
DESCRIPTORS: ACHING
DESCRIPTORS: ACHING

## 2024-06-02 NOTE — FLOWSHEET NOTE
CCPD CONNECTION:  06/02/24 1615   Vitals   /86   Temp 97.9 °F (36.6 °C)   Pulse 84   Respirations 21   SpO2 98 %   Observations & Evaluations   Level of Consciousness 0   Oriented X 4   Heart Rhythm Regular   Respiratory Quality/Effort Dyspnea with exertion   O2 Device Nasal cannula   Bilateral Breath Sounds Diminished   Skin Condition/Temp Warm;Dry   Appetite Poor   Abdomen Inspection Soft;Obese   Edema Right lower extremity;Left lower extremity   RLE Edema +2;Pitting   LLE Edema +2;Pitting   Peritoneal Dialysis Catheter Right lower abdomen   No placement date or time found.   Present on Admission/Arrival: Yes  Catheter Location: Right lower abdomen   Status Accessed   Site Condition Clean, dry, intact   Dressing Status Clean, dry & intact   Dressing Gauze   Date of Last Dressing Change 06/02/24   Dialysis Type Continuous cycling   Exit Site Condition good   Catheter Care Given Yes  (2 minute alcavis scrub/soak)   Drainage Description Tan  (mild amt of exudate)   Cycler   Verification of Prescription CCPD   Informed Consent    (chronic consent applies)   Total Volume Programmed 70896 mL   Therapy Time (Hours:Minutes) 9:00   Cycler Type Yen HomeChoice   Fill Volume 3000 mL   Last Fill Volume 0 mL   Dextrose Setting Same (Nonextraneal)   I Drain Alarm 0 mL   Number of Cycles 5   Bag Volume 6000 mL   Number of Bags Used 3   Dianeal Solution ((bag1) Dextrose 4.25% in 6000 mL (bag 2&3) Dextrose 2.5% in 6000 mL)     Hepatitis B Surface Ag   Date/Time Value Ref Range Status   06/01/2024 10:54 AM <0.10 Index Final     Hep B S Ab   Date/Time Value Ref Range Status   06/01/2024 10:54 AM <3.10 mIU/mL Final   Primary RN SBAR: DEL Das RN  Patient Education provided: infection prevention  Preferred Education method and Primary language: demonstration / english

## 2024-06-02 NOTE — FLOWSHEET NOTE
06/02/24 0800   Vitals   /63   Pulse 77   Respirations 16   SpO2 97 %   Observations & Evaluations   Level of Consciousness 0   Oriented X 4   Heart Rhythm Regular   Respiratory Quality/Effort Unlabored   O2 Device Nasal cannula   Skin Color Other (comment)  (Appropriate for ethnicity)   Skin Condition/Temp Warm;Dry   Abdomen Inspection Soft;Obese   Edema Right lower extremity;Left lower extremity   RLE Edema +3;Pitting   LLE Edema +3;Pitting   Peritoneal Dialysis Catheter Right lower abdomen   No placement date or time found.   Present on Admission/Arrival: Yes  Catheter Location: Right lower abdomen   Status Deaccessed   Site Condition Clean, dry, intact   Dressing Status Clean, dry & intact   Dressing Gauze   Date of Last Dressing Change 06/01/24   Dialysis Type Continuous cycling   Exit Site Condition good   Catheter Care Given Yes   Post-Treatment (Cycler)   Average Dwell Time (Hours:Minutes) :56   Lost Dwell Time (Hours:Minutes) 1:07   Effluent Appearance Clear;Yellow   PD Output (mL) 875 mL  (Idrain 2 ml + total  ml = net  ml)     Primary RN SBAR: Terence Das RN  Alarms: \"slow flow patient\"   Comments: After two minute Alcavis scrub/soak performed, patient disconnected and sterile mini cap placed.  Used cassette, bags and lines discarded in red bin.  PD catheter taped securely to the patient's abdomen.

## 2024-06-02 NOTE — FLOWSHEET NOTE
06/01/24 2055   Vitals   /64   Temp 98.6 °F (37 °C)   Temp Source Oral   Pulse 77   Respirations 21   SpO2 98 %   Observations & Evaluations   Level of Consciousness 0   Oriented X 4   Heart Rhythm Regular   Respiratory Quality/Effort Unlabored   O2 Device Nasal cannula   Bilateral Breath Sounds Diminished   Abdomen Inspection Obese;Soft   Edema Right lower extremity;Left lower extremity   RLE Edema +3;Pitting   LLE Edema +3;Pitting   Peritoneal Dialysis Catheter Right lower abdomen   No placement date or time found.   Present on Admission/Arrival: Yes  Catheter Location: Right lower abdomen   Status Accessed   Site Condition Clean, dry, intact   Dressing Status Clean, dry & intact   Dressing Gauze   Date of Last Dressing Change 06/01/24   Dialysis Type Continuous cycling   Exit Site Condition good   Catheter Care Given Yes   Cycler   Verification of Prescription CCPD   Informed Consent  Yes   Total Volume Programmed 54702 mL   Therapy Time (Hours:Minutes) 9:00   Cycler Type Yen HomeChoice   Fill Volume 3000 mL   Last Fill Volume 0 mL   Dextrose Setting Same (Nonextraneal)   I Drain Alarm 0 mL   Number of Cycles 5   Bag Volume 6000 mL   Number of Bags Used 3   Dianeal Solution Dextrose 4.25% in 2000 mL  (1bag x 4.25, 2 bags 2 x 2.5)     Time Out (time):   Primary RN SBAR: Lori Barnes RN   Patient Education: tx changes, fluid goals. Bags to be used.  Hepatitis B Surface Ag   Date/Time Value Ref Range Status   03/28/2024 05:23 AM <0.10 Index Final     Hep B S Ab   Date/Time Value Ref Range Status   03/28/2024 05:23 AM <3.10 mIU/mL Final

## 2024-06-03 LAB
ALBUMIN SERPL-MCNC: 1.6 G/DL (ref 3.5–5)
ALBUMIN/GLOB SERPL: 0.4 (ref 1.1–2.2)
ALP SERPL-CCNC: 204 U/L (ref 45–117)
ALT SERPL-CCNC: 12 U/L (ref 12–78)
ANION GAP SERPL CALC-SCNC: 7 MMOL/L (ref 5–15)
AST SERPL-CCNC: 18 U/L (ref 15–37)
BASOPHILS # BLD: 0 K/UL (ref 0–0.1)
BASOPHILS NFR BLD: 0 % (ref 0–1)
BILIRUB SERPL-MCNC: 0.4 MG/DL (ref 0.2–1)
BUN SERPL-MCNC: 13 MG/DL (ref 6–20)
BUN/CREAT SERPL: 2 (ref 12–20)
CALCIUM SERPL-MCNC: 8.9 MG/DL (ref 8.5–10.1)
CHLORIDE SERPL-SCNC: 97 MMOL/L (ref 97–108)
CO2 SERPL-SCNC: 29 MMOL/L (ref 21–32)
CREAT SERPL-MCNC: 5.41 MG/DL (ref 0.55–1.02)
DIFFERENTIAL METHOD BLD: ABNORMAL
EKG ATRIAL RATE: 95 BPM
EKG DIAGNOSIS: NORMAL
EKG P AXIS: 23 DEGREES
EKG P-R INTERVAL: 188 MS
EKG Q-T INTERVAL: 364 MS
EKG QRS DURATION: 68 MS
EKG QTC CALCULATION (BAZETT): 457 MS
EKG R AXIS: 9 DEGREES
EKG T AXIS: 59 DEGREES
EKG VENTRICULAR RATE: 95 BPM
EOSINOPHIL # BLD: 0 K/UL (ref 0–0.4)
EOSINOPHIL NFR BLD: 0 % (ref 0–7)
ERYTHROCYTE [DISTWIDTH] IN BLOOD BY AUTOMATED COUNT: 18.4 % (ref 11.5–14.5)
GLOBULIN SER CALC-MCNC: 3.7 G/DL (ref 2–4)
GLUCOSE BLD STRIP.AUTO-MCNC: 122 MG/DL (ref 65–117)
GLUCOSE BLD STRIP.AUTO-MCNC: 153 MG/DL (ref 65–117)
GLUCOSE BLD STRIP.AUTO-MCNC: 162 MG/DL (ref 65–117)
GLUCOSE BLD STRIP.AUTO-MCNC: 206 MG/DL (ref 65–117)
GLUCOSE SERPL-MCNC: 160 MG/DL (ref 65–100)
HCT VFR BLD AUTO: 35.9 % (ref 35–47)
HGB BLD-MCNC: 11.7 G/DL (ref 11.5–16)
IMM GRANULOCYTES # BLD AUTO: 0.1 K/UL (ref 0–0.04)
IMM GRANULOCYTES NFR BLD AUTO: 1 % (ref 0–0.5)
INR PPP: 4.7 (ref 0.9–1.1)
LYMPHOCYTES # BLD: 1.3 K/UL (ref 0.8–3.5)
LYMPHOCYTES NFR BLD: 11 % (ref 12–49)
MCH RBC QN AUTO: 31.4 PG (ref 26–34)
MCHC RBC AUTO-ENTMCNC: 32.6 G/DL (ref 30–36.5)
MCV RBC AUTO: 96.2 FL (ref 80–99)
MONOCYTES # BLD: 0.6 K/UL (ref 0–1)
MONOCYTES NFR BLD: 5 % (ref 5–13)
NEUTS SEG # BLD: 9.6 K/UL (ref 1.8–8)
NEUTS SEG NFR BLD: 83 % (ref 32–75)
NRBC # BLD: 0.14 K/UL (ref 0–0.01)
NRBC BLD-RTO: 1.2 PER 100 WBC
PLATELET # BLD AUTO: 207 K/UL (ref 150–400)
PMV BLD AUTO: 11.7 FL (ref 8.9–12.9)
POTASSIUM SERPL-SCNC: 3.9 MMOL/L (ref 3.5–5.1)
PROT SERPL-MCNC: 5.3 G/DL (ref 6.4–8.2)
PROTHROMBIN TIME: 44.3 SEC (ref 9–11.1)
RBC # BLD AUTO: 3.73 M/UL (ref 3.8–5.2)
RBC MORPH BLD: ABNORMAL
SERVICE CMNT-IMP: ABNORMAL
SODIUM SERPL-SCNC: 133 MMOL/L (ref 136–145)
WBC # BLD AUTO: 11.6 K/UL (ref 3.6–11)

## 2024-06-03 PROCEDURE — 36415 COLL VENOUS BLD VENIPUNCTURE: CPT

## 2024-06-03 PROCEDURE — 2580000003 HC RX 258

## 2024-06-03 PROCEDURE — 6370000000 HC RX 637 (ALT 250 FOR IP)

## 2024-06-03 PROCEDURE — 2580000003 HC RX 258: Performed by: INTERNAL MEDICINE

## 2024-06-03 PROCEDURE — 6370000000 HC RX 637 (ALT 250 FOR IP): Performed by: HOSPITALIST

## 2024-06-03 PROCEDURE — 97530 THERAPEUTIC ACTIVITIES: CPT

## 2024-06-03 PROCEDURE — 97166 OT EVAL MOD COMPLEX 45 MIN: CPT

## 2024-06-03 PROCEDURE — 6370000000 HC RX 637 (ALT 250 FOR IP): Performed by: STUDENT IN AN ORGANIZED HEALTH CARE EDUCATION/TRAINING PROGRAM

## 2024-06-03 PROCEDURE — 2700000000 HC OXYGEN THERAPY PER DAY

## 2024-06-03 PROCEDURE — 6370000000 HC RX 637 (ALT 250 FOR IP): Performed by: FAMILY MEDICINE

## 2024-06-03 PROCEDURE — 97161 PT EVAL LOW COMPLEX 20 MIN: CPT

## 2024-06-03 PROCEDURE — 80053 COMPREHEN METABOLIC PANEL: CPT

## 2024-06-03 PROCEDURE — 93005 ELECTROCARDIOGRAM TRACING: CPT | Performed by: FAMILY MEDICINE

## 2024-06-03 PROCEDURE — 6360000002 HC RX W HCPCS: Performed by: STUDENT IN AN ORGANIZED HEALTH CARE EDUCATION/TRAINING PROGRAM

## 2024-06-03 PROCEDURE — 90945 DIALYSIS ONE EVALUATION: CPT

## 2024-06-03 PROCEDURE — 97535 SELF CARE MNGMENT TRAINING: CPT

## 2024-06-03 PROCEDURE — 1100000000 HC RM PRIVATE

## 2024-06-03 PROCEDURE — 85025 COMPLETE CBC W/AUTO DIFF WBC: CPT

## 2024-06-03 PROCEDURE — 6360000002 HC RX W HCPCS

## 2024-06-03 PROCEDURE — 94660 CPAP INITIATION&MGMT: CPT

## 2024-06-03 PROCEDURE — 93010 ELECTROCARDIOGRAM REPORT: CPT | Performed by: SPECIALIST

## 2024-06-03 PROCEDURE — 94761 N-INVAS EAR/PLS OXIMETRY MLT: CPT

## 2024-06-03 PROCEDURE — 82962 GLUCOSE BLOOD TEST: CPT

## 2024-06-03 PROCEDURE — 85610 PROTHROMBIN TIME: CPT

## 2024-06-03 PROCEDURE — 2580000003 HC RX 258: Performed by: NURSE PRACTITIONER

## 2024-06-03 RX ORDER — SODIUM BICARBONATE 650 MG/1
650 TABLET ORAL ONCE
Status: COMPLETED | OUTPATIENT
Start: 2024-06-03 | End: 2024-06-03

## 2024-06-03 RX ORDER — ERYTHROMYCIN ETHYLSUCCINATE 200 MG/5ML
200 SUSPENSION ORAL
Status: DISCONTINUED | OUTPATIENT
Start: 2024-06-03 | End: 2024-06-04 | Stop reason: HOSPADM

## 2024-06-03 RX ORDER — ATORVASTATIN CALCIUM 20 MG/1
40 TABLET, FILM COATED ORAL NIGHTLY
Status: DISCONTINUED | OUTPATIENT
Start: 2024-06-03 | End: 2024-06-04 | Stop reason: HOSPADM

## 2024-06-03 RX ORDER — MAGNESIUM HYDROXIDE/ALUMINUM HYDROXICE/SIMETHICONE 120; 1200; 1200 MG/30ML; MG/30ML; MG/30ML
30 SUSPENSION ORAL EVERY 6 HOURS PRN
Status: DISCONTINUED | OUTPATIENT
Start: 2024-06-03 | End: 2024-06-04 | Stop reason: HOSPADM

## 2024-06-03 RX ADMIN — GENTAMICIN SULFATE: 1 CREAM TOPICAL at 18:33

## 2024-06-03 RX ADMIN — ONDANSETRON 4 MG: 2 INJECTION INTRAMUSCULAR; INTRAVENOUS at 09:56

## 2024-06-03 RX ADMIN — FOLIC ACID 1000 MCG: 1 TABLET ORAL at 10:50

## 2024-06-03 RX ADMIN — METRONIDAZOLE 500 MG: 500 TABLET ORAL at 14:59

## 2024-06-03 RX ADMIN — ONDANSETRON 4 MG: 2 INJECTION INTRAMUSCULAR; INTRAVENOUS at 03:41

## 2024-06-03 RX ADMIN — SODIUM CHLORIDE, SODIUM LACTATE, CALCIUM CHLORIDE, MAGNESIUM CHLORIDE AND DEXTROSE 6000 ML: 4.25; 538; 448; 18.3; 5.08 INJECTION, SOLUTION INTRAPERITONEAL at 18:32

## 2024-06-03 RX ADMIN — MIDODRINE HYDROCHLORIDE 15 MG: 5 TABLET ORAL at 20:45

## 2024-06-03 RX ADMIN — MIDODRINE HYDROCHLORIDE 15 MG: 5 TABLET ORAL at 10:52

## 2024-06-03 RX ADMIN — METRONIDAZOLE 500 MG: 500 TABLET ORAL at 06:05

## 2024-06-03 RX ADMIN — ATORVASTATIN CALCIUM 40 MG: 20 TABLET, FILM COATED ORAL at 20:44

## 2024-06-03 RX ADMIN — METRONIDAZOLE 500 MG: 500 TABLET ORAL at 21:01

## 2024-06-03 RX ADMIN — FLUDROCORTISONE ACETATE 0.1 MG: 0.1 TABLET ORAL at 20:45

## 2024-06-03 RX ADMIN — HYDROCORTISONE: 25 CREAM TOPICAL at 10:29

## 2024-06-03 RX ADMIN — ACETAMINOPHEN 650 MG: 325 TABLET ORAL at 14:58

## 2024-06-03 RX ADMIN — MICONAZOLE NITRATE: 2 POWDER TOPICAL at 20:46

## 2024-06-03 RX ADMIN — SODIUM CHLORIDE, PRESERVATIVE FREE 10 ML: 5 INJECTION INTRAVENOUS at 10:29

## 2024-06-03 RX ADMIN — POLYETHYLENE GLYCOL 3350 17 G: 17 POWDER, FOR SOLUTION ORAL at 10:52

## 2024-06-03 RX ADMIN — SODIUM BICARBONATE 650 MG: 650 TABLET ORAL at 10:28

## 2024-06-03 RX ADMIN — ERYTHROMYCIN 200 MG: 200 SUSPENSION ORAL at 10:01

## 2024-06-03 RX ADMIN — ANTACID TABLETS 500 MG: 500 TABLET, CHEWABLE ORAL at 06:05

## 2024-06-03 RX ADMIN — WATER 1000 MG: 1 INJECTION INTRAMUSCULAR; INTRAVENOUS; SUBCUTANEOUS at 10:00

## 2024-06-03 RX ADMIN — ACETAMINOPHEN 650 MG: 325 TABLET ORAL at 22:35

## 2024-06-03 RX ADMIN — SODIUM CHLORIDE, PRESERVATIVE FREE 10 ML: 5 INJECTION INTRAVENOUS at 20:44

## 2024-06-03 RX ADMIN — MICONAZOLE NITRATE: 2 POWDER TOPICAL at 10:29

## 2024-06-03 RX ADMIN — MIDODRINE HYDROCHLORIDE 15 MG: 5 TABLET ORAL at 14:58

## 2024-06-03 RX ADMIN — ERYTHROMYCIN 200 MG: 200 SUSPENSION ORAL at 20:44

## 2024-06-03 RX ADMIN — SODIUM BICARBONATE 650 MG: 650 TABLET ORAL at 20:45

## 2024-06-03 RX ADMIN — SODIUM CHLORIDE, SODIUM LACTATE, CALCIUM CHLORIDE, MAGNESIUM CHLORIDE AND DEXTROSE 6000 ML: 2.5; 538; 448; 18.3; 5.08 INJECTION, SOLUTION INTRAPERITONEAL at 18:33

## 2024-06-03 RX ADMIN — ALLOPURINOL 100 MG: 100 TABLET ORAL at 10:50

## 2024-06-03 RX ADMIN — FLUDROCORTISONE ACETATE 0.1 MG: 0.1 TABLET ORAL at 10:50

## 2024-06-03 RX ADMIN — HYDROCORTISONE: 25 CREAM TOPICAL at 20:44

## 2024-06-03 RX ADMIN — PANTOPRAZOLE SODIUM 40 MG: 40 TABLET, DELAYED RELEASE ORAL at 14:59

## 2024-06-03 RX ADMIN — ONDANSETRON 4 MG: 2 INJECTION INTRAMUSCULAR; INTRAVENOUS at 20:44

## 2024-06-03 RX ADMIN — DOCUSATE SODIUM LIQUID 100 MG: 100 LIQUID ORAL at 21:15

## 2024-06-03 RX ADMIN — PANTOPRAZOLE SODIUM 40 MG: 40 TABLET, DELAYED RELEASE ORAL at 06:02

## 2024-06-03 RX ADMIN — DOCUSATE SODIUM LIQUID 100 MG: 100 LIQUID ORAL at 10:50

## 2024-06-03 ASSESSMENT — PAIN SCALES - GENERAL
PAINLEVEL_OUTOF10: 0
PAINLEVEL_OUTOF10: 2
PAINLEVEL_OUTOF10: 3

## 2024-06-03 ASSESSMENT — PAIN DESCRIPTION - LOCATION: LOCATION: ABDOMEN

## 2024-06-03 ASSESSMENT — PAIN DESCRIPTION - ORIENTATION: ORIENTATION: ANTERIOR

## 2024-06-03 NOTE — FLOWSHEET NOTE
CCPD Connection: 06/03/24 1830   Vitals   /68   Pulse 100   Respirations 18   Observations & Evaluations   Level of Consciousness 0   Oriented X 4   Heart Rhythm Regular   Respiratory Quality/Effort Dyspnea with exertion   O2 Device Nasal cannula   Skin Condition/Temp Warm;Dry   Abdomen Inspection Soft;Obese   Edema Right lower extremity;Left lower extremity   RLE Edema +2   LLE Edema +2   Peritoneal Dialysis Catheter Right lower abdomen   No placement date or time found.   Present on Admission/Arrival: Yes  Catheter Location: Right lower abdomen   Status Accessed   Site Condition Clean, dry, intact   Dressing Status New dressing applied   Dressing Gauze   Date of Last Dressing Change 06/03/24   Dialysis Type Continuous cycling   Exit Site Condition excellent   Catheter Care Given   (2 minute alcavis scrub/soak)   Cycler   Verification of Prescription CCPD   Informed Consent    (chronic consent applies)   Total Volume Programmed 43083 mL   Therapy Time (Hours:Minutes) 9:00   Cycler Type Yen HomeChoice   Fill Volume 3000 mL   Last Fill Volume 0 mL   Dextrose Setting Same (Nonextraneal)   I Drain Alarm 0 mL   Number of Cycles 5   Bag Volume 6000 mL   Number of Bags Used 3   Dianeal Solution   (bag1) Dextrose 4.25% in 6000 mL (bag 2&3) Dextrose 2.5% in 6000 mL)     Hepatitis B Surface Ag   Date/Time Value Ref Range Status   06/01/2024 10:54 AM <0.10 Index Final     Hep B S Ab   Date/Time Value Ref Range Status   06/01/2024 10:54 AM <3.10 mIU/mL Final     Time Out (time):   Primary RN SBAR: DEL Das RN  Patient Education provided: infection prevention  Preferred Education method and Primary language: demonstration / english  Comment: Remained present for completion of initial drain and initiation of first fill.

## 2024-06-03 NOTE — FLOWSHEET NOTE
CCPD DISCONNECTION: 06/03/24 0700   Vitals   BP (!) 103/57   Pulse 84   Respirations 14   Observations & Evaluations   Level of Consciousness 0   Oriented X 4   Heart Rhythm Regular   Respiratory Quality/Effort Dyspnea with exertion   O2 Device Nasal cannula   Bilateral Breath Sounds Diminished   Skin Condition/Temp Warm;Dry   Abdomen Inspection Soft;Obese   Edema Right lower extremity;Left lower extremity   RLE Edema +2;Pitting   LLE Edema +2;Pitting   Peritoneal Dialysis Catheter Right lower abdomen   No placement date or time found.   Present on Admission/Arrival: Yes  Catheter Location: Right lower abdomen   Status Accessed   Site Condition Clean, dry, intact   Dressing Status Clean, dry & intact   Dressing Gauze   Date of Last Dressing Change 06/02/24   Dialysis Type Continuous cycling   Catheter Care Given Yes  (2 minute alcavis scrub/soak)   Post-Treatment (Cycler)   Average Dwell Time (Hours:Minutes) 1:11   Lost Dwell Time (Hours:Minutes) 0:03   Effluent Appearance Clear;Yellow   I Drain (mL) 11 mL   PD Output (mL) 1309 mL  (id 11 + TUF 1298)     Primary RN SBAR: CINTHYA Mcneal RN  Comments: Two minute Alcavis scrub/soak performed, patient disconnected and sterile mini cap placed. Used cassette, bags and lines discarded in red bin. PD catheter taped securely to the patient's abdomen. Call bell and personal belongings within reach.

## 2024-06-04 ENCOUNTER — TELEPHONE (OUTPATIENT)
Age: 69
End: 2024-06-04

## 2024-06-04 ENCOUNTER — APPOINTMENT (OUTPATIENT)
Facility: HOSPITAL | Age: 69
DRG: 871 | End: 2024-06-04
Payer: MEDICARE

## 2024-06-04 VITALS
TEMPERATURE: 97.9 F | BODY MASS INDEX: 41.95 KG/M2 | WEIGHT: 293 LBS | SYSTOLIC BLOOD PRESSURE: 127 MMHG | HEIGHT: 70 IN | RESPIRATION RATE: 18 BRPM | HEART RATE: 88 BPM | DIASTOLIC BLOOD PRESSURE: 83 MMHG | OXYGEN SATURATION: 100 %

## 2024-06-04 LAB
ALBUMIN SERPL-MCNC: 1.7 G/DL (ref 3.5–5)
ALBUMIN/GLOB SERPL: 0.5 (ref 1.1–2.2)
ALP SERPL-CCNC: 186 U/L (ref 45–117)
ALT SERPL-CCNC: 15 U/L (ref 12–78)
ANION GAP SERPL CALC-SCNC: 10 MMOL/L (ref 5–15)
ANION GAP SERPL CALC-SCNC: 11 MMOL/L (ref 5–15)
AST SERPL-CCNC: 22 U/L (ref 15–37)
BACTERIA SPEC CULT: NORMAL
BASOPHILS # BLD: 0 K/UL (ref 0–0.1)
BASOPHILS NFR BLD: 0 % (ref 0–1)
BILIRUB SERPL-MCNC: 0.5 MG/DL (ref 0.2–1)
BUN SERPL-MCNC: 13 MG/DL (ref 6–20)
BUN SERPL-MCNC: 15 MG/DL (ref 6–20)
BUN/CREAT SERPL: 2 (ref 12–20)
BUN/CREAT SERPL: 3 (ref 12–20)
CALCIUM SERPL-MCNC: 8.6 MG/DL (ref 8.5–10.1)
CALCIUM SERPL-MCNC: 8.7 MG/DL (ref 8.5–10.1)
CHLORIDE SERPL-SCNC: 97 MMOL/L (ref 97–108)
CHLORIDE SERPL-SCNC: 97 MMOL/L (ref 97–108)
CO2 SERPL-SCNC: 27 MMOL/L (ref 21–32)
CO2 SERPL-SCNC: 28 MMOL/L (ref 21–32)
COMMENT:: NORMAL
CREAT SERPL-MCNC: 5.4 MG/DL (ref 0.55–1.02)
CREAT SERPL-MCNC: 5.4 MG/DL (ref 0.55–1.02)
DIFFERENTIAL METHOD BLD: ABNORMAL
EOSINOPHIL # BLD: 0 K/UL (ref 0–0.4)
EOSINOPHIL NFR BLD: 0 % (ref 0–7)
ERYTHROCYTE [DISTWIDTH] IN BLOOD BY AUTOMATED COUNT: 18.2 % (ref 11.5–14.5)
EST. AVERAGE GLUCOSE BLD GHB EST-MCNC: 85 MG/DL
GLOBULIN SER CALC-MCNC: 3.4 G/DL (ref 2–4)
GLUCOSE BLD STRIP.AUTO-MCNC: 146 MG/DL (ref 65–117)
GLUCOSE BLD STRIP.AUTO-MCNC: 165 MG/DL (ref 65–117)
GLUCOSE BLD STRIP.AUTO-MCNC: 249 MG/DL (ref 65–117)
GLUCOSE SERPL-MCNC: 159 MG/DL (ref 65–100)
GLUCOSE SERPL-MCNC: 229 MG/DL (ref 65–100)
GRAM STN SPEC: NORMAL
HBA1C MFR BLD: 4.6 % (ref 4–5.6)
HBV CORE AB SERPL QL IA: NEGATIVE
HBV CORE IGM SERPL QL IA: NEGATIVE
HCT VFR BLD AUTO: 35.9 % (ref 35–47)
HGB BLD-MCNC: 11.7 G/DL (ref 11.5–16)
IMM GRANULOCYTES # BLD AUTO: 0.1 K/UL (ref 0–0.04)
IMM GRANULOCYTES NFR BLD AUTO: 1 % (ref 0–0.5)
INR PPP: 4.3 (ref 0.9–1.1)
LYMPHOCYTES # BLD: 1.6 K/UL (ref 0.8–3.5)
LYMPHOCYTES NFR BLD: 13 % (ref 12–49)
MAGNESIUM SERPL-MCNC: 1.9 MG/DL (ref 1.6–2.4)
MAGNESIUM SERPL-MCNC: 2.1 MG/DL (ref 1.6–2.4)
MCH RBC QN AUTO: 31.1 PG (ref 26–34)
MCHC RBC AUTO-ENTMCNC: 32.6 G/DL (ref 30–36.5)
MCV RBC AUTO: 95.5 FL (ref 80–99)
MONOCYTES # BLD: 0.9 K/UL (ref 0–1)
MONOCYTES NFR BLD: 7 % (ref 5–13)
NEUTS SEG # BLD: 9.8 K/UL (ref 1.8–8)
NEUTS SEG NFR BLD: 79 % (ref 32–75)
NRBC # BLD: 0.14 K/UL (ref 0–0.01)
NRBC BLD-RTO: 1.1 PER 100 WBC
PHOSPHATE SERPL-MCNC: 2.7 MG/DL (ref 2.6–4.7)
PLATELET # BLD AUTO: 193 K/UL (ref 150–400)
PMV BLD AUTO: 11.6 FL (ref 8.9–12.9)
POTASSIUM SERPL-SCNC: 2.8 MMOL/L (ref 3.5–5.1)
POTASSIUM SERPL-SCNC: 3.8 MMOL/L (ref 3.5–5.1)
PROT SERPL-MCNC: 5.1 G/DL (ref 6.4–8.2)
PROTHROMBIN TIME: 40.8 SEC (ref 9–11.1)
RBC # BLD AUTO: 3.76 M/UL (ref 3.8–5.2)
RBC MORPH BLD: ABNORMAL
SERVICE CMNT-IMP: ABNORMAL
SERVICE CMNT-IMP: NORMAL
SODIUM SERPL-SCNC: 135 MMOL/L (ref 136–145)
SODIUM SERPL-SCNC: 135 MMOL/L (ref 136–145)
SPECIMEN HOLD: NORMAL
TROPONIN I SERPL HS-MCNC: 33 NG/L (ref 0–51)
TROPONIN I SERPL HS-MCNC: 36 NG/L (ref 0–51)
WBC # BLD AUTO: 12.4 K/UL (ref 3.6–11)

## 2024-06-04 PROCEDURE — 6370000000 HC RX 637 (ALT 250 FOR IP): Performed by: INTERNAL MEDICINE

## 2024-06-04 PROCEDURE — 6370000000 HC RX 637 (ALT 250 FOR IP)

## 2024-06-04 PROCEDURE — 83036 HEMOGLOBIN GLYCOSYLATED A1C: CPT

## 2024-06-04 PROCEDURE — 6360000002 HC RX W HCPCS

## 2024-06-04 PROCEDURE — 6370000000 HC RX 637 (ALT 250 FOR IP): Performed by: STUDENT IN AN ORGANIZED HEALTH CARE EDUCATION/TRAINING PROGRAM

## 2024-06-04 PROCEDURE — 2700000000 HC OXYGEN THERAPY PER DAY

## 2024-06-04 PROCEDURE — 2500000003 HC RX 250 WO HCPCS

## 2024-06-04 PROCEDURE — 80053 COMPREHEN METABOLIC PANEL: CPT

## 2024-06-04 PROCEDURE — 84484 ASSAY OF TROPONIN QUANT: CPT

## 2024-06-04 PROCEDURE — 82962 GLUCOSE BLOOD TEST: CPT

## 2024-06-04 PROCEDURE — 94761 N-INVAS EAR/PLS OXIMETRY MLT: CPT

## 2024-06-04 PROCEDURE — 2580000003 HC RX 258

## 2024-06-04 PROCEDURE — 85025 COMPLETE CBC W/AUTO DIFF WBC: CPT

## 2024-06-04 PROCEDURE — 83735 ASSAY OF MAGNESIUM: CPT

## 2024-06-04 PROCEDURE — 93005 ELECTROCARDIOGRAM TRACING: CPT | Performed by: EMERGENCY MEDICINE

## 2024-06-04 PROCEDURE — 85610 PROTHROMBIN TIME: CPT

## 2024-06-04 PROCEDURE — 36415 COLL VENOUS BLD VENIPUNCTURE: CPT

## 2024-06-04 PROCEDURE — 71045 X-RAY EXAM CHEST 1 VIEW: CPT

## 2024-06-04 PROCEDURE — 84100 ASSAY OF PHOSPHORUS: CPT

## 2024-06-04 PROCEDURE — 6360000002 HC RX W HCPCS: Performed by: STUDENT IN AN ORGANIZED HEALTH CARE EDUCATION/TRAINING PROGRAM

## 2024-06-04 RX ORDER — MAGNESIUM HYDROXIDE/ALUMINUM HYDROXICE/SIMETHICONE 120; 1200; 1200 MG/30ML; MG/30ML; MG/30ML
30 SUSPENSION ORAL EVERY 6 HOURS PRN
Qty: 355 ML | Refills: 0 | Status: SHIPPED | OUTPATIENT
Start: 2024-06-04 | End: 2024-07-04

## 2024-06-04 RX ORDER — METOPROLOL TARTRATE 1 MG/ML
5 INJECTION, SOLUTION INTRAVENOUS ONCE
Status: COMPLETED | OUTPATIENT
Start: 2024-06-04 | End: 2024-06-04

## 2024-06-04 RX ORDER — METOPROLOL SUCCINATE 25 MG/1
25 TABLET, EXTENDED RELEASE ORAL EVERY EVENING
Status: DISCONTINUED | OUTPATIENT
Start: 2024-06-04 | End: 2024-06-04 | Stop reason: HOSPADM

## 2024-06-04 RX ORDER — POLYETHYLENE GLYCOL 3350 17 G/17G
17 POWDER, FOR SOLUTION ORAL DAILY
Qty: 30 PACKET | Refills: 0 | Status: SHIPPED | OUTPATIENT
Start: 2024-06-05 | End: 2024-07-05

## 2024-06-04 RX ORDER — SODIUM CHLORIDE, SODIUM LACTATE, CALCIUM CHLORIDE, MAGNESIUM CHLORIDE AND DEXTROSE 2.5; 538; 448; 18.3; 5.08 G/100ML; MG/100ML; MG/100ML; MG/100ML; MG/100ML
6000 INJECTION, SOLUTION INTRAPERITONEAL
Status: DISCONTINUED | OUTPATIENT
Start: 2024-06-04 | End: 2024-06-04 | Stop reason: HOSPADM

## 2024-06-04 RX ORDER — SODIUM CHLORIDE, SODIUM LACTATE, CALCIUM CHLORIDE, MAGNESIUM CHLORIDE AND DEXTROSE 2.5; 538; 448; 18.3; 5.08 G/100ML; MG/100ML; MG/100ML; MG/100ML; MG/100ML
6000 INJECTION, SOLUTION INTRAPERITONEAL
Qty: 180000 ML | Refills: 0 | Status: SHIPPED | OUTPATIENT
Start: 2024-06-04 | End: 2024-07-04

## 2024-06-04 RX ORDER — INSULIN LISPRO 100 [IU]/ML
0-8 INJECTION, SOLUTION INTRAVENOUS; SUBCUTANEOUS
Status: DISCONTINUED | OUTPATIENT
Start: 2024-06-04 | End: 2024-06-04 | Stop reason: HOSPADM

## 2024-06-04 RX ORDER — ERYTHROMYCIN ETHYLSUCCINATE 200 MG/5ML
200 SUSPENSION ORAL
Qty: 200 ML | Refills: 0 | Status: SHIPPED | OUTPATIENT
Start: 2024-06-04 | End: 2024-06-14

## 2024-06-04 RX ORDER — METRONIDAZOLE 500 MG/1
500 TABLET ORAL EVERY 8 HOURS SCHEDULED
Qty: 16 TABLET | Refills: 0 | Status: SHIPPED | OUTPATIENT
Start: 2024-06-04 | End: 2024-06-10

## 2024-06-04 RX ORDER — FLUDROCORTISONE ACETATE 0.1 MG/1
0.1 TABLET ORAL 2 TIMES DAILY
Qty: 60 TABLET | Refills: 3 | Status: SHIPPED | OUTPATIENT
Start: 2024-06-04 | End: 2024-10-02

## 2024-06-04 RX ORDER — POTASSIUM CHLORIDE 7.45 MG/ML
10 INJECTION INTRAVENOUS
Status: COMPLETED | OUTPATIENT
Start: 2024-06-04 | End: 2024-06-04

## 2024-06-04 RX ORDER — SODIUM BICARBONATE 650 MG/1
650 TABLET ORAL 2 TIMES DAILY
Status: DISCONTINUED | OUTPATIENT
Start: 2024-06-04 | End: 2024-06-04 | Stop reason: HOSPADM

## 2024-06-04 RX ORDER — GENTAMICIN SULFATE 1 MG/G
CREAM TOPICAL
Qty: 15 G | Refills: 0 | Status: SHIPPED | OUTPATIENT
Start: 2024-06-04

## 2024-06-04 RX ORDER — MAGNESIUM SULFATE 1 G/100ML
1000 INJECTION INTRAVENOUS ONCE
Status: COMPLETED | OUTPATIENT
Start: 2024-06-04 | End: 2024-06-04

## 2024-06-04 RX ORDER — SODIUM BICARBONATE 650 MG/1
650 TABLET ORAL 2 TIMES DAILY
Qty: 60 TABLET | Refills: 0 | Status: SHIPPED | OUTPATIENT
Start: 2024-06-04 | End: 2024-07-04

## 2024-06-04 RX ORDER — ATORVASTATIN CALCIUM 40 MG/1
40 TABLET, FILM COATED ORAL NIGHTLY
Qty: 30 TABLET | Refills: 3 | Status: SHIPPED | OUTPATIENT
Start: 2024-06-04

## 2024-06-04 RX ORDER — METOPROLOL SUCCINATE 25 MG/1
25 TABLET, EXTENDED RELEASE ORAL EVERY EVENING
Qty: 30 TABLET | Refills: 3 | Status: SHIPPED | OUTPATIENT
Start: 2024-06-04

## 2024-06-04 RX ORDER — INSULIN LISPRO 100 [IU]/ML
0-4 INJECTION, SOLUTION INTRAVENOUS; SUBCUTANEOUS NIGHTLY
Status: DISCONTINUED | OUTPATIENT
Start: 2024-06-04 | End: 2024-06-04 | Stop reason: HOSPADM

## 2024-06-04 RX ORDER — POTASSIUM CHLORIDE 20 MEQ/1
40 TABLET, EXTENDED RELEASE ORAL DAILY
Qty: 60 TABLET | Refills: 0 | Status: SHIPPED | OUTPATIENT
Start: 2024-06-04 | End: 2024-07-04

## 2024-06-04 RX ADMIN — MAGNESIUM SULFATE HEPTAHYDRATE 1000 MG: 1 INJECTION, SOLUTION INTRAVENOUS at 03:30

## 2024-06-04 RX ADMIN — POTASSIUM CHLORIDE 10 MEQ: 7.45 INJECTION INTRAVENOUS at 08:05

## 2024-06-04 RX ADMIN — SODIUM BICARBONATE 650 MG: 650 TABLET ORAL at 14:15

## 2024-06-04 RX ADMIN — METRONIDAZOLE 500 MG: 500 TABLET ORAL at 05:41

## 2024-06-04 RX ADMIN — MICONAZOLE NITRATE: 2 POWDER TOPICAL at 09:05

## 2024-06-04 RX ADMIN — ONDANSETRON 4 MG: 2 INJECTION INTRAMUSCULAR; INTRAVENOUS at 03:23

## 2024-06-04 RX ADMIN — ALLOPURINOL 100 MG: 100 TABLET ORAL at 09:02

## 2024-06-04 RX ADMIN — POTASSIUM CHLORIDE 10 MEQ: 7.45 INJECTION INTRAVENOUS at 05:40

## 2024-06-04 RX ADMIN — SODIUM BICARBONATE 650 MG: 650 TABLET ORAL at 09:47

## 2024-06-04 RX ADMIN — METOPROLOL TARTRATE 5 MG: 5 INJECTION INTRAVENOUS at 01:48

## 2024-06-04 RX ADMIN — METRONIDAZOLE 500 MG: 500 TABLET ORAL at 14:11

## 2024-06-04 RX ADMIN — HYDROCORTISONE: 25 CREAM TOPICAL at 09:05

## 2024-06-04 RX ADMIN — FOLIC ACID 1000 MCG: 1 TABLET ORAL at 09:02

## 2024-06-04 RX ADMIN — POTASSIUM BICARBONATE 40 MEQ: 782 TABLET, EFFERVESCENT ORAL at 09:03

## 2024-06-04 RX ADMIN — MIDODRINE HYDROCHLORIDE 15 MG: 5 TABLET ORAL at 09:01

## 2024-06-04 RX ADMIN — ERYTHROMYCIN 200 MG: 200 SUSPENSION ORAL at 05:41

## 2024-06-04 RX ADMIN — FLUDROCORTISONE ACETATE 0.1 MG: 0.1 TABLET ORAL at 09:00

## 2024-06-04 RX ADMIN — SODIUM CHLORIDE, PRESERVATIVE FREE 10 ML: 5 INJECTION INTRAVENOUS at 09:04

## 2024-06-04 RX ADMIN — PANTOPRAZOLE SODIUM 40 MG: 40 TABLET, DELAYED RELEASE ORAL at 05:41

## 2024-06-04 RX ADMIN — ERYTHROMYCIN 200 MG: 200 SUSPENSION ORAL at 14:11

## 2024-06-04 RX ADMIN — MIDODRINE HYDROCHLORIDE 15 MG: 5 TABLET ORAL at 14:12

## 2024-06-04 RX ADMIN — POTASSIUM CHLORIDE 10 MEQ: 7.45 INJECTION INTRAVENOUS at 04:35

## 2024-06-04 RX ADMIN — POTASSIUM CHLORIDE 10 MEQ: 7.45 INJECTION INTRAVENOUS at 03:28

## 2024-06-04 NOTE — TELEPHONE ENCOUNTER
Dr Wolff/[facility] is requesting patient to have hospital follow up and INR checked this Thursday- 06.06.2024. Dr Wolff's phone: [phone]

## 2024-06-04 NOTE — FLOWSHEET NOTE
Peritoneal Dialysis Disconnection / [phone]    Primary RN SBAR: GAGAN Hodge  Patient Education provided: access/site care  Preferred Education method and Primary language: demonstration/English    Hepatitis B Surface Ag   Date/Time Value Ref Range Status   06/01/2024 10:54 AM <0.10 Index Final     Hep B S Ab   Date/Time Value Ref Range Status   06/01/2024 10:54 AM <3.10 mIU/mL Final       06/04/24 0338   Peritoneal Dialysis Catheter Right lower abdomen   No placement date or time found.   Present on Admission/Arrival: Yes  Catheter Location: Right lower abdomen   Status Deaccessed   Site Condition Clean, dry, intact   Dressing Status Clean, dry & intact   Dressing Gauze   Date of Last Dressing Change 06/03/24   Dialysis Type Continuous cycling   Exit Site Condition excellent   Catheter Care Given Yes   Post-Treatment (Cycler)   Average Dwell Time (Hours:Minutes) 1:09   Lost Dwell Time (Hours:Minutes) 0:06   Effluent Appearance Clear;Yellow   PD Output (mL) 560 mL  (ID 91 ml +  ml =560)     Ely RN at bedside to disconnect pt from CCPD tx. Orders, consent, pt, and code status confirmed. Tx completed. Used cassette and bags discarded. Education and pre/post report given to primary RN.    Net fluid loss: 560 ml   Walk in

## 2024-06-04 NOTE — DISCHARGE INSTRUCTIONS
[facility] Residency     HOME DISCHARGE INSTRUCTIONS    Elizabeth Hopkins / [id number] : 1955    Admission date: 2024 Discharge date: 2024     Please bring this form with you to show your care provider at your follow-up appointment.    Primary care provider:  Nadege Perez DO      Discharging provider:  Bobby Wolff MD  - Family Medicine Resident  Renzo Garland MD - Family Medicine Attending      You have been admitted to the hospital with the following diagnoses:    ACUTE DIAGNOSES:  Lactic acidosis [E87.20]  Proctitis [K62.89]  Peripheral edema [R60.0]  Leg swelling [M79.89]  Supratherapeutic INR [R79.1]  Hemorrhoids, unspecified hemorrhoid type [K64.9]    You were admitted to the hospital for Severe Sepsis, which is your body's inflammatory response to an infection. Your infection was most likley caused by Proctitis, which is infection of your rectum. The Gastroenterologist, Nephrologist (Kidney) and Intensivist (ICU Doctor) followed along during your hospital stay. You were treated with IV Antibiotics and IV Fluids. Your hospital stay was complicated by hypotensive (low) blood pressure readings during your dialysis session, so you were treated with IV medication to help sustain your blood pressure. Your symptoms improved and you were transitioned to oral antibiotics to continue at home with potassium supplements daily. Please follow up with your Nephrology for continued management of your peritoneal dialysis and follow up with your GI doctor for further evaluation. Please be sure to follow up with your PCP for a hospital follow up and to check your PT / INR.       Medication Changes:   START Metronidazole 500 mg three times daily (take one tablet before bed tonight  and start three times a day dosing tomorrow )   HOLD Warfarin for 2 days (restart on ) and follow up with your PCP  at 9:45 AM to recheck your INR and

## 2024-06-04 NOTE — DISCHARGE SUMMARY
[id number] [place] [address]   Office [phone]  Fax [phone]        Discharge / Transfer / Off-Service Note     Name: Elizabeth Hopkins MRN: [id number]  Sex: Female   YOB: 1955  Age: 68 y.o.  PCP: Nadege Perez DO     Date of admission: 5/30/2024  Date of discharge/transfer: 6/4/2024    Attending physician at admission: Renzo Garland.  Attending physician at discharge/transfer: Renzo Garland.  Resident physician at discharge/transfer: Elodia Rachel DO     Consultants during hospitalization  IP CONSULT TO NEPHROLOGY  IP CONSULT TO FAMILY MEDICINE  IP CONSULT TO PHARMACY  IP CONSULT TO INTENSIVIST  IP CONSULT TO CASE MANAGEMENT     Admission diagnoses   Lactic acidosis [E87.20]  Proctitis [K62.89]  Peripheral edema [R60.0]  Leg swelling [M79.89]  Supratherapeutic INR [R79.1]  Hemorrhoids, unspecified hemorrhoid type [K64.9]    Recommended follow-up after discharge    1. PCP- Nadege Perez DO  2. Nephrology - [facility]  3. GI - MILO Dickey     To follow up on with PCP:   - Recheck BMP (potassium)   - PT/INR Check (Appt scheduled for 6/6/24 at 9:45 AM)  - Blood Pressure Check  - Final results of blood cultures (5/30)     To follow up on with Nephrology:   - Management of CCPD     To follow up on with GI   - Gastroparesis & Proctitis follow up    ------------------------------------------------------------------------------------------------------------------    History of Present Illness    As per admitting provider, Dr. Santhosh Hernandez MD:   \"Elizabeth Hopkins is a 68 y.o. 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      For the last month patient has been having bright red blood from her hemorrhoids especially with wiping, denied blood in stool.  She has been having rectal

## 2024-06-04 NOTE — CONSULTS
NEPHROLOGY CONSULT NOTE     Patient: Elizabeth Hopkins MRN: [id number]  PCP: Nadege Perez DO   :     1955  Age:   68 y.o.  Sex:  female      Referring physician: Renzo Garland MD  Reason for consultation:   Admission Date: 2024  4:45 PM  LOS: 1 day        ASSESSMENT and PLAN :   ESRD on CCPD, patient of Dr Parker @ [facility] PD  Volume overload/anasarca  Abdominal tenderness - proctitis vs peritonitis  Septic shock/hypotension  Lactic acidosis  HFpEF  Hypoalbuminemia    PLAN  CCPD with 2.5% x 5 cycles, 3L  Obtain PD fluid cell count and culture  Increase midodrine to 15mg TID from home dose of 2.5mg TID  If BP continues to decrease, might need to consider CRRT and vasopressor support  Trend daily renal indices           Subjective:   HPI: Elizabeth Hopkins is a 68 y.o.  female with past medical hx of ESRD on CCPD came in to the hospital for supratherapeutic INR and rectal bleed.  Nephrology was consulted for ESRD management.  In ER, it was found that patient was volume overloaded and mildly hypotensive. Patient has baseline low BP and takes midodrine TID. Patient reports increased generalized edema. Patient reports usually using 2.5% with extraneal for last fill. Patient requested to use 2.5% tonight.     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 allred    G6PD deficiency     trait    GERD (gastroesophageal reflux disease)  
A TOTAL OF 3 DOSES IN 15 MINUTES   nystatin (MYCOSTATIN) 912895 UNIT/GM powder   No No   Sig: Apply 3 times daily.   ondansetron (ZOFRAN-ODT) 4 MG disintegrating tablet   Yes No   Sig: Take 1 tablet by mouth every 8 hours as needed   oxyCODONE-acetaminophen (PERCOCET)  MG per tablet   No No   Sig: Take 1 tablet by mouth every 8 hours as needed for Pain for up to 30 days. Max Daily Amount: 3 tablets   pantoprazole (PROTONIX) 40 MG tablet   Yes No   Sig: Take 1 tablet by mouth 2 times daily (before meals)   polyethylene glycol (GLYCOLAX) 17 GM/SCOOP powder   Yes No   Sig: Take 17 g by mouth daily as needed   potassium chloride (KLOR-CON M) 20 MEQ extended release tablet   Yes No   Sig: Take 1 tablet by mouth daily   warfarin (COUMADIN) 1 MG tablet   No No   Sig: Take 1 mg Sun-Mon-Tues- Wed Thurs-Sat and 2.5 mg on Friday      Facility-Administered Medications: None       Hospital Medications:  Current Facility-Administered Medications   Medication Dose Route Frequency    metoprolol succinate (TOPROL XL) extended release tablet 25 mg  25 mg Oral QPM    insulin lispro (HUMALOG,ADMELOG) injection vial 0-8 Units  0-8 Units SubCUTAneous TID WC    insulin lispro (HUMALOG,ADMELOG) injection vial 0-4 Units  0-4 Units SubCUTAneous Nightly    potassium bicarb-citric acid (EFFER-K) effervescent tablet 40 mEq  40 mEq Oral Daily    sodium bicarbonate tablet 650 mg  650 mg Oral BID    dianeal lo-aura (ULTRABAG) 2.5% 6,000 mL  6,000 mL IntraPERitoneal Daily before dinner    erythromycin (EES) 200 MG/5ML suspension 200 mg  200 mg Oral 4x Daily AC & HS    aluminum & magnesium hydroxide-simethicone (MAALOX) 200-200-20 MG/5ML suspension 30 mL  30 mL Oral Q6H PRN    atorvastatin (LIPITOR) tablet 40 mg  40 mg Oral Nightly    Warfarin - Please hold dose on 6/3 due to high INR.   Other Once    metroNIDAZOLE (FLAGYL) tablet 500 mg  500 mg Oral 3 times per day    glucose chewable tablet 16 g  4 tablet Oral PRN    dextrose bolus 10% 125 mL

## 2024-06-04 NOTE — PROGRESS NOTES
NEPHROLOGY CONSULT NOTE     Patient: Elizabeth Hopkins MRN: [id number]  PCP: Nadege Perez DO   :     1955  Age:   68 y.o.  Sex:  female      Referring physician: Renzo Garland MD  Reason for consultation:   Admission Date: 2024  4:45 PM  LOS: 5 days        ASSESSMENT and PLAN :   ESRD on CCPD, patient of Dr Parker @ [facility] PD  Volume overload/anasarca  Abdominal tenderness - proctitis vs peritonitis  Septic shock/hypotension  Lactic acidosis  HFpEF  Hypoalbuminemia    PLAN  CCPD with use 2.5%D  UF:560 ml  PD fluid negative for peritonitis   cont midodrine to 15mg TID   Give KCL 40 meq po daily and agree with IV KCL supplement             Subjective:   HPI: just finish the PD rx  Off levophed   C/o abdominal pain, N/V  Poor po intake   Past Medical Hx:   Past Medical History:   Diagnosis Date    Aortic aneurysm (HCC)     Abdominal    Chronic kidney disease     Hemodiaylsis  3x/week    Chronic pain     CKD (chronic kidney disease) stage 4, GFR 15-29 ml/min ([facility]) 02/10/2017    Coronary atherosclerosis of native coronary artery 2011    Diabetic gastroparesis (HCC)     Diastolic heart failure (HCC) 10/05/2012    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 allred    G6PD deficiency     trait    GERD (gastroesophageal reflux disease)     Gout     Heart failure (HCC)     Hemodialysis access, AV graft ([facility]) 2017    Dialysis MWF    [address]     Hypertension     ILD (interstitial lung disease) (HCC)     open lung bx CJW     Infected dental caries 2020    Infection of total right knee replacement (HCC) 2020    Loss of appetite 2020    Menopause     Morbid obesity (HCC)     OA (osteoarthritis)     Ovarian cancer (HCC)     cervical and uterine    Rheumatoid arteritis (HCC)     S/P left 
     SOUND CRITICAL CARE INITIAL ASSESSMENT.      Name: Elizabeth Hopkins   : 1955   MRN: [id number]   Date: 2024        Chief Complaint   Patient presents with    Rectal Bleeding    Leg Swelling         HPI:     68 y.o. female PMHx of  ESRD on PD, hemorrhoids, CAD, VTE on Warfarin, ILD who is admitted for rectal bleeding and supra-therapeutic INR.  CT abdomen/pelvis was notable for proctitis.  Patient was hypotensive on presentation.  She was given 500 cc fluids with improvement.  Later, she was started on peritoneal dialysis.  Thereafter, she was noted to be hypotensive.  She has not had any significant fevers, rigors, chills.  Abdominal exam is stable.  Fluid analysis from peritoneal dialysis does not suggest significant peritonitis.  Due to hypotension, she was started on low-dose Levophed.    Subjective: At the time of my evaluation, patient reports having generalized weakness.  She has tenderness in abdomen which is chronic.  Denies significant chest pain, lightheadedness, dizziness.  Denies any nausea or vomiting.  No fever or chills.    : Tolerated PD overnight. Levophed weaned off. Reports abdominal pain - requiring dilaudid.    Problem list:     Septic shock related to proctitis  Lower GI bleed  Supratherapeutic INR  ESRD on PD  Hemorrhoids  Lactic acidosis    Assessment/Plan:     - Continue ceftriaxone/Flagyl for proctitis.  - Continue to hold warfarin.    - Levophed weaned off. Continue midodrine.  - Continue hydrocortisone 50 every 8 hours.  - Recommend switching dilaudid to subcutaneous or oral alternative and try to limit its use.  No clear etiology for severe abdominal pain on CT abdomen.   - Continue zofran and prn benadryl for nausea.  - Continue prn  - Use bowel regimen for constipation and proctitis.  - Peritoneal fluid not consistent with peritonitis.  - Peritoneal dialysis per nephrology.  - Remaining per primary team.    GOALS OF CARE/ADVANCED DIRECTIVES  Full code    DVT 
     SOUND CRITICAL CARE INITIAL ASSESSMENT.      Name: Elizabeth Hopkins   : 1955   MRN: [id number]   Date: 2024        Chief Complaint   Patient presents with    Rectal Bleeding    Leg Swelling         HPI:     68 y.o. female PMHx of  ESRD on PD, hemorrhoids, CAD, VTE on Warfarin, ILD who is admitted for rectal bleeding and supra-therapeutic INR.  CT abdomen/pelvis was notable for proctitis.  Patient was hypotensive on presentation.  She was given 500 cc fluids with improvement.  Later, she was started on peritoneal dialysis.  Thereafter, she was noted to be hypotensive.  She has not had any significant fevers, rigors, chills.  Abdominal exam is stable.  Fluid analysis from peritoneal dialysis does not suggest significant peritonitis.  Due to hypotension, she was started on low-dose Levophed.    Subjective: At the time of my evaluation, patient reports having generalized weakness.  She has tenderness in abdomen which is chronic.  Denies significant chest pain, lightheadedness, dizziness.  Denies any nausea or vomiting.  No fever or chills.      Problem list:     Septic shock related to proctitis  Lower GI bleed  Supratherapeutic INR  ESRD on PD  Hemorrhoids  Lactic acidosis    Assessment/Plan:     -Agree with using ceftriaxone/Flagyl for proctitis.  -Continue to hold warfarin.  No strong reason to reverse anticoagulation at this point.    - Wean Levophed as tolerated  -Albumin 250 cc x 1, and start midodrine 10 mg 3 times daily.  -Start hydrocortisone 50 every 8 hours  - Use bowel regimen for constipation and proctitis.  - Prep H for hemorrhoids.  - Repeat lactic acid in am.  -Peritoneal fluid not consistent with peritonitis.  -Peritoneal dialysis per nephrology.  - Remaining per primary team.    GOALS OF CARE/ADVANCED DIRECTIVES  Full code    DVT prophylaxis: Supratherapeutic INR  SUP: PPI  Code status: Full      I personally spent 35 minutes of critical care time.  This is time spent at this 
  [id number] [id number]   Office [phone]  Fax [phone]          Subjective / Objective     Subjective  Overnight Events: RRT for sustained tachycardia 140-150 bmp with subjective chest tightness. EKG nonischemic. Symptoms resolved s/p x 1 push 5 mg IV metoprolol & Zofran.   .   Patient seen and examined at bedside. Reports having some dry heaving this morning, but no true episode of vomiting. Reports feeling \"a little\" better, but is tired. Denies fever, chills, diarrhea. CP, SOB, N/V, dysuria.    Respiratory:     Vitals:    06/04/24 0407   BP: 117/67   Pulse: 86   Resp:    Temp:    SpO2:      Physical Examination:   General: No acute distress. Cooperative.   Head: Normocephalic. Atraumatic.   Eyes:              Conjunctiva pink. Sclera white.   Throat: Mucosa pink. Moist mucous membranes.    Neck: Supple. Normal ROM. No stiffness.   Respiratory: Moving air bilaterally, crackles at the bases. No w/r/r.   Cardiovascular: Normal rate & rhythm.No m/r/g. Pulses 2+ throughout.   GI: + bowel sounds. Mild generalized tenderness, no rebound tenderness or guarding   Extremities: 2+ LE edema.    Skin: Warm, dry. No rashes.    Neuro: A&Ox4. No focal deficit       I/O:  06/03 0701 - 06/04 0700  In: 48.2 [I.V.:48.2]  Out: 560   Inpatient Medications    Current Facility-Administered Medications   Medication Dose Route Frequency    metoprolol succinate (TOPROL XL) extended release tablet 25 mg  25 mg Oral QPM    insulin lispro (HUMALOG,ADMELOG) injection vial 0-8 Units  0-8 Units SubCUTAneous TID WC    insulin lispro (HUMALOG,ADMELOG) injection vial 0-4 Units  0-4 Units SubCUTAneous Nightly    erythromycin (EES) 200 MG/5ML suspension 200 mg  200 mg Oral 4x Daily AC & HS    aluminum & magnesium hydroxide-simethicone (MAALOX) 200-200-20 MG/5ML suspension 30 mL  30 mL Oral Q6H PRN    atorvastatin (LIPITOR) tablet 40 mg  40 mg Oral Nightly    Warfarin - Please hold dose on 6/3 due to high INR.   
  [id number] [id number]   Office [phone]  Fax [phone]          Subjective / Objective     Subjective  Overnight Events:NAEON  .   Patient seen and examined at bedside. Abdominal pain slightly improved since having BM yesterday. BM was non bloody, still has NBNB vomiting. Denies fever, chills, diarrhea. CP, SOB, N/V, dysuria.    Respiratory:     Vitals:    06/03/24 0515   BP: 134/60   Pulse: 72   Resp: 13   Temp:    SpO2:      Physical Examination:   General: No acute distress. Alert. Cooperative.   Head: Normocephalic. Atraumatic.   Eyes:              Conjunctiva pink. Sclera white. PERRL.   Throat: Mucosa pink. Moist mucous membranes.    Neck: Supple. Normal ROM. No stiffness.   Respiratory: Moving air bilaterally, crackles at the bases. No w/r/r.   Cardiovascular: Tachycardic. Normal S1,S2. No m/r/g. Pulses 2+ throughout.   GI: + bowel sounds. Mild generalized tenderness, no rebound tenderness or guarding   Extremities: 2+ LE edema. Distal pulses intact.    Musculoskeletal: Full ROM in all extremities.    Skin: Warm, dry. No rashes.    Neuro: A&Ox4. No focal deficit       I/O:  06/02 0701 - 06/03 0700  In: 613.6 [I.V.:1]  Out: 875   Inpatient Medications    Current Facility-Administered Medications   Medication Dose Route Frequency    Warfarin - Please hold 6/2 PM dose   Other Once    metroNIDAZOLE (FLAGYL) tablet 500 mg  500 mg Oral 3 times per day    calcium carbonate (TUMS) chewable tablet 500 mg  500 mg Oral TID PRN    glucose chewable tablet 16 g  4 tablet Oral PRN    dextrose bolus 10% 125 mL  125 mL IntraVENous PRN    Or    dextrose bolus 10% 250 mL  250 mL IntraVENous PRN    glucagon injection 1 mg  1 mg SubCUTAneous PRN    dextrose 10 % infusion   IntraVENous Continuous PRN    insulin lispro (HUMALOG,ADMELOG) injection vial 0-4 Units  0-4 Units SubCUTAneous TID WC    insulin lispro (HUMALOG,ADMELOG) injection vial 0-4 Units  0-4 Units SubCUTAneous Nightly    
  [id number] [id number]   Office [phone]  Fax [phone]          Subjective / Objective     Subjective  Overnight Events: LA 5.2> 6.7>6.2. Receiving PD.   Patient seen and examined at bedside. Denies CP, SOB, N/V, dysuria.    Respiratory:     Vitals:    05/31/24 0200   BP: (!) 87/62   Pulse: 94   Resp: 10   Temp:    SpO2: 100%     Physical Examination:   General: No acute distress. Alert. Cooperative.   Head: Normocephalic. Atraumatic.   Eyes:              Conjunctiva pink. Sclera white. PERRL.   Throat: Mucosa pink. Moist mucous membranes. No tonsillar exudates or erythema. Palate movement equal bilaterally.   Neck: Supple. Normal ROM. No stiffness.   Respiratory: Moving air bilaterally, crackles at the bases. No w/r/r.   Cardiovascular: Tachycardic. Normal S1,S2. No m/r/g. Pulses 2+ throughout.   GI: + bowel sounds. Non acute abdominal pain that was migratory at different points of the night. No rebound tenderness or guarding. Distended.   Extremities: 3+ LE edema. Distal pulses intact.    Musculoskeletal: Full ROM in all extremities.    Skin: Warm, dry. No rashes.    Neuro: A&Ox4. CN II-XII grossly intact. Strength 5/5 in all extremities.        I/O:  05/30 0701 - 05/31 0700  In: 600   Out: -   Inpatient Medications    Current Facility-Administered Medications   Medication Dose Route Frequency    ondansetron (ZOFRAN) injection 4 mg  4 mg IntraVENous Q6H PRN    HYDROmorphone HCl PF (DILAUDID) injection 1 mg  1 mg IntraVENous Q3H PRN    gentamicin (GARAMYCIN) 0.1 % cream   Topical Daily PRN    sodium chloride flush 0.9 % injection 5-40 mL  5-40 mL IntraVENous 2 times per day    sodium chloride flush 0.9 % injection 5-40 mL  5-40 mL IntraVENous PRN    0.9 % sodium chloride infusion   IntraVENous PRN    acetaminophen (TYLENOL) tablet 650 mg  650 mg Oral Q6H PRN    Or    acetaminophen (TYLENOL) suppository 650 mg  650 mg Rectal Q6H PRN    cefTRIAXone (ROCEPHIN) 1,000 mg in sterile 
  [id number] [id number]   Office [phone]  Fax [phone]          Subjective / Objective     Subjective  Overnight Events:NAEON, refused stress dose steroids  .   Patient seen and examined at bedside. Complains of generalized abdominal pain with constipation and persistent NBNB vomiting with inability to tolerate PO intake,. Denies fever, chills, diarrhea. CP, SOB, N/V, dysuria.    Respiratory:     Vitals:    06/01/24 1045   BP: (!) 80/53   Pulse: 78   Resp: 14   Temp:    SpO2: 99%     Physical Examination:   General: No acute distress. Alert. Cooperative.   Head: Normocephalic. Atraumatic.   Eyes:              Conjunctiva pink. Sclera white. PERRL.   Throat: Mucosa pink. Moist mucous membranes.    Neck: Supple. Normal ROM. No stiffness.   Respiratory: Moving air bilaterally, crackles at the bases. No w/r/r.   Cardiovascular: Tachycardic. Normal S1,S2. No m/r/g. Pulses 2+ throughout.   GI: + bowel sounds. Mild generalized tenderness, no rebound tenderness or guarding   Extremities: 2+ LE edema. Distal pulses intact.    Musculoskeletal: Full ROM in all extremities.    Skin: Warm, dry. No rashes.    Neuro: A&Ox4. No focal deficit       I/O:  05/31 0701 - 06/01 0700  In: 112.5 [I.V.:23]  Out: 377   Inpatient Medications    Current Facility-Administered Medications   Medication Dose Route Frequency    glucose chewable tablet 16 g  4 tablet Oral PRN    dextrose bolus 10% 125 mL  125 mL IntraVENous PRN    Or    dextrose bolus 10% 250 mL  250 mL IntraVENous PRN    glucagon injection 1 mg  1 mg SubCUTAneous PRN    dextrose 10 % infusion   IntraVENous Continuous PRN    insulin lispro (HUMALOG,ADMELOG) injection vial 0-4 Units  0-4 Units SubCUTAneous TID WC    insulin lispro (HUMALOG,ADMELOG) injection vial 0-4 Units  0-4 Units SubCUTAneous Nightly    HYDROmorphone HCl PF (DILAUDID) injection 2 mg  2 mg SubCUTAneous Q4H PRN    HYDROmorphone HCl PF (DILAUDID) injection 1 mg  1 mg SubCUTAneous 
  [facility] Residency [facility]  Affiliated with [facility] and [facility]       Resident Progress Note in Brief    S: Pt was started on Levophed gtt today morning.   Patient seen and examined at bedside with Dr. Jones (PGY 2).      O:  /72   Pulse 59   Temp 97.9 °F (36.6 °C) (Oral)   Resp 21   Ht 1.778 m (5' 10\")   Wt 117.9 kg (260 lb)   SpO2 100%   BMI 37.31 kg/m²   Physical Examination:   General appearance - alert, chronically ill appearing, and in no distress  Chest - decreased breath sounds bilaterally, appreciate some crackles at the bases  Heart - normal rate, regular rhythm, normal S1, S2, no murmurs, rubs, clicks or gallops,   Abdomen - soft, nondistended, no masses or organomegaly, tender on the epigastric region  Neurological - alert, oriented, normal speech, no focal findings  Extremities - peripheral pulses normal, 2/3 + pitting edema extending to the knees which has improved from time of admission    A/P:   PMHx of  ESRD on PD, hemorrhoids, CAD, VTE on Warfarin, ILD who is admitted for Severe sepsis likely 2/2 Proctaitis and supra-therapeutic INR.     Septic shock 2/2 Proctitis: CT A/P with evidence of proctitis. Abdominal exam with migratory pain, no rebound tenderness or guarding. LA 5.2. Other ddx for LA include hypovolemia iso decreased PO intake and anasarca from third spacing her fluids. Less likely from mesenteric ischemia given non acute abdominal pain and no rebound tenderness on exam. Procal 0.55.   - Admit to Step down  - Vitals per unit routine  - Following Bcx and fluid cultures  - Bowel regimen  - Start CTX and Flagyl  - Trend LA  - Regular diet with 1.2L fluid restriction  - Intensivist consulted, appreciate recs   - Started Hydrocortisone 50mg q8hr  - Albumin 250cc x1  - Midodrine 10mg TID    - Levophed gtt, titrate as needed     Anasarca/ESRD on PD/HFpEF:  follows with Dr. Parker. Unable to fluid resuscitate iso severe sepsis given as she is third spacing.   - 
0130: RRT called d/t tachycardia. Pt HR sustaining in 140's-150's. VSS, no complaints of chest pain or SOB. EKG shows sinus tachycardia with HR of 156. One time dose of metoprolol 5 mg IV given once. Pt is back to sinus rhythm. Will continue to assess and monitor.  
0822 INR 4.7 - notified Primary RN, [facility].   
1440 Report Received from GAGAN Batres.     1530 Pt arrived on unit.   
9394    AVS reviewed with patient. Opportunity for questions offered. Ivs removed.  Pt discharged home with homehealth via H2H transport.  
Brief Progress Note    Responded to RRT ~0130 for sustained tachycardia with chest tightness.     S: Nurse reported pt HR sustaining 140-150s. Patient endorses chest tightness that feels more intense than her usual tightness/pain from GERD/hiatal hernia, chronic angina. Noted that it began after she was turned in the bed. Also with nausea and spitting up.    O: AF, RR 18, , /69, 99% on RA. PE notable for tachycardia to 150s, no m/r/g appreciated, lungs clear anteriorly, spitting up into emesis bag. EKG with SVT, rate 145, significant artifact but no ST elevations.    A: Sustained SVT in the s/o b-blocker being on hold d/t hypotension, accompanied by chest tightness.    P: Obtain trop, CBC, CMP, CXR. Give push of 5mg IV metoprolol as patient is spitting up and may not tolerate PO b-blocker. Zofran for nausea. Monitor VS and sx while awaiting workup.  
[facility] Pharmacy Dosing Services: 5/30/24   Consult for Warfarin Dosing by Pharmacy by Dr. Bowen  Consult provided for this 68 y.o.F , for indication of history of DVT   Day of Therapy: resumed from outpatient (per records patient has been on 1mg Sun, Mon, Tues, Wed, Thur, Sat and 2.5mg on Friday)    Admission INR=>13.9  Prior INR outpatient was 3.8 on 5/1/24    Dose to achieve an INR goal of 2-3    Order entered to hold warfarin today due to supratherapeutic INR.     Significant drug interactions, such as enoxaparin: metronidazole d1, Vitamin K 5mg given today     PT/INR Lab Results   Component Value Date/Time    INR >13.9 05/30/2024 05:09 PM    INR  05/30/2024 03:14 PM      Comment:      greater than 8.0    INR 1.8 05/03/2023 02:34 PM    INREXT 4.1 05/20/2021 12:00 AM      Platelets Lab Results   Component Value Date/Time     05/30/2024 07:49 PM      H/H Lab Results   Component Value Date/Time    HGB 11.7 05/30/2024 07:49 PM        Pharmacy to follow daily and will provide subsequent Warfarin dosing based on clinical status.  MIGUEL MATUTE [facility]) Contact information [phone]      
Good UF with 4.25% D. K is low. Give KCL 40 meq iv x1  
[facility] Pharmacy Dosing Services: 5/30/24   Consult for Warfarin Dosing by Pharmacy by Dr. Bowen  Consult provided for this 68 y.o.F , for indication of history of DVT   Day of Therapy: resumed from outpatient (per records patient has been on 1mg Sun, Mon, Tues, Wed, Thur, Sat and 2.5mg on Friday)    INR =11.8 (down from 13.9 s/p Vitamin K 5mg PO x 1 5/30 pm)   Prior INR outpatient was 3.8 on 5/1/24    Dose to achieve an INR goal of 2-3    Order entered to HOLD warfarin today due to supratherapeutic INR.     Significant drug interactions, such as enoxaparin: Ceftriaxone, Metronidazole, Vitamin K 5mg given 5/30, Allopurinol (chronic), Fludrocortisone (chronic?)    PT/INR Lab Results   Component Value Date/Time    INR 11.8 05/31/2024 05:52 AM    INR 1.8 05/03/2023 02:34 PM    INREXT 4.1 05/20/2021 12:00 AM      Platelets Lab Results   Component Value Date/Time     05/31/2024 05:52 AM      H/H Lab Results   Component Value Date/Time    HGB 11.7 05/31/2024 05:52 AM        Pharmacy to follow daily and will provide subsequent Warfarin dosing based on clinical status.  Yarelis Gutierres [facility]) Contact information [phone]        
[facility] Pharmacy Dosing Services: 6/1/2024  Consult for Warfarin Dosing by Pharmacy by Dr. Bowen  Consult provided for this 68 y.o.F , for indication of history of DVT   Day of Therapy: resumed from outpatient (per records patient has been on 1mg Sun, Mon, Tues, Wed, Thur, Sat and 2.5mg on Friday)  Goal INR 2-3    5/30 INR >13.9 - warfarin held  5/31 INR 11.8- warfarin held  6/1 INR 5.6- warfarin 0.5 mg given  6/2 INR 4.8- warfarin held    Low dose warfarin given 6/1 to avoid precipitous drop in INR. Continues to trend down (5.6--> 4.8) but this has slowed. Will hold warfarin today 6/2 as pt historically sensitive to dosing (8.5 mg / week PTA). Continue w/ daily INR and CBC monitoring.     Significant drug interactions, such as enoxaparin: Ceftriaxone, Metronidazole, Vitamin K 5mg given 5/30, Allopurinol (chronic), Fludrocortisone (chronic?)    PT/INR Lab Results   Component Value Date/Time    INR 5.6 06/01/2024 03:38 AM    INR 1.8 05/03/2023 02:34 PM    INREXT 4.1 05/20/2021 12:00 AM      Platelets Lab Results   Component Value Date/Time     06/01/2024 03:38 AM      H/H Lab Results   Component Value Date/Time    HGB 11.6 06/01/2024 03:38 AM        Thanks,   David Jean, SaimaD            
[facility] Pharmacy Dosing Services: 06/03/24    Consult for Warfarin Dosing by Pharmacy by Dr. Jones  Consult provided for this 68 y.o. female, for indication of Hx of VTE  Day of Therapy: Resumed from PTA med list  Home Regimen: Warfarin 2.5 mg on Fridays and 1 mg on other six days of the week  Dose to achieve an INR goal of 2-3    Assessment/Plan:  H/H and platelets are stable, note recent hx of hemorrhoids but no current bleeding documented  Today's INR of 4.7 is above target goal  Will hold warfarin dose tonight due to high INR  INR ordered daily with AM labs per protocol    Significant drug interactions, such as enoxaparin: Allopurinol 100 mg PO daily, erythromycin 200 mg PO QID, phytonadione 5 mg PO given 5/30 at 2006  PT/INR Lab Results   Component Value Date/Time    INR 4.7 06/03/2024 06:13 AM    INR 1.8 05/03/2023 02:34 PM    INREXT 4.1 05/20/2021 12:00 AM      Platelets Lab Results   Component Value Date/Time     06/03/2024 06:13 AM      H/H Lab Results   Component Value Date/Time    HGB 11.7 06/03/2024 06:13 AM        Pharmacy to follow daily and will provide subsequent Warfarin dosing based on clinical status.  Dario Mendieta [facility]) Contact information [phone]  
[facility] MEDICINE RESIDENCY PROGRAM   Senior Resident Admission Note    Chart reviewed. Patient seen, examined, and discussed with Dr. Hernandez (PGY-1). See H&P note for more details.    CC: Rectal Bleeding and Leg Swelling      HPI:  Elizabeth Hopkins is a 68 y.o. female w/ a known history of ESRD, CAD, VTE on Warfarin, ILD  who presents for further evaluation from PCP office for supratherapeutic INR and rectal bleeding secondary to hemorrhoids. She reports she has had bleeding from her hemorrhoids for the last month but states her stools are not bloody. She has pain in her rectum and reports it has been difficult to pass stools due to pain. Does use stool softeners daily. She has not missed any PD sessions but has noticed more edema than usual, especially in her lower extremities. Poor oral intake x 2 weeks.     A/P: 68 y.o. female admitted for severe sepsis.    Proctitis  Severe sepsis  Lactic Acidosis  SIRS 2/4 (HR, RR), Lactic acid 5.2. Abdomen diffusely tender but no rebound or guarding. Source of sepsis likely proctitis noted on CT C/A/P imaging vs peritonitis (lower suspicion). Lactic acidosis could also be due to hypovolemia, she is third spacing fluid and has had poor oral intake x 2 weeks.   - Admit to stepdown  - Continue antibiotics with Rocephin and Flagyl to cover proctitis and other potential infectious source of peritonitis.   - Obtain fluid studies with PD  - Trend LA  - Will have to be cautious with IV fluid resuscitation given anasarca and ESRD. Did receive 500cc in ER. Prioritize fluid removal.   - Monitor BP for necessity of pressor support. Continue home Midodrine. Consider Levophed if unable to maintain MAP > 65.    Supratherapeutic INR  Hemorrhoids  INR > 13.9. Goal INR for hx VTE is 2-3. She has received Vitamin K 5 mg in ER. Her hemorrhoids have been bleeding but hemoglobin is within normal range. No indication for FFP or PCC at this time.   - Consult pharmacy for warfarin dosing 
NUTRITION    Best practice alert was triggered based on results obtained during nursing admission assessment for Weight loss 14-23# and poor PO intake.  Wt Readings from Last 10 Encounters:   06/01/24 (!) 136.4 kg (300 lb 11.3 oz)   05/01/24 124.7 kg (275 lb)   03/30/24 134.3 kg (296 lb)   01/10/24 127.9 kg (282 lb)   12/15/23 124.7 kg (275 lb)   11/09/23 131.5 kg (290 lb)   10/25/23 127 kg (280 lb)   10/12/23 122 kg (269 lb)   09/21/23 121.6 kg (268 lb)   07/26/23 121.6 kg (268 lb)     Meal Intake:   No data found.  Supplement Intake:  No data found.     The patient's chart was reviewed - no documentation to support stated weight loss.  Plan to see patient for rescreen per policy.  Thank you.     Tatum Young MS, RD, [facility]  Ext: [phone], or via [place] [employer]      
Occupational Therapy    Completed chart review and discussed patient with nursing.  Nursing cleared for therapy.  Received patient in bed.  She reports her goal is to walk again but she does want to participate in therapy at this time.  Offered bed level eval and she declined.  Her eyes were closed through most of the session.     She reports she stays in her lift recliner chair and has assistance to pivot to [facility].  She reports she has not walked since last admission which is approx 1 month.  (March/April 2024)  She received OT, PT, and SLP HH services.      Per last eval March 2024: \"Patient reports a history of chronic back pain due to DDD and b/l shoulder pain due severe OA. 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 She had a recent 3 week hospital stay and returned home for ~1 week before current admission. At home, she was ambulating some using a rollator in PT but otherwise was using a wheelchair or motorized scooter to mobilize with assistance for transfers. Her wheelchair fits in her bathroom and she required assistance with tub transfer bench and toilet transfers (with [facility] over toilet frame). She did not require ADL assist outside of transfers.\"    Will continue to follow for OT manuel.       Thank you,    Tricia Lundberg, OT    
Occupational therapy note:  Chart reviewed.  Attempted to see patient for OT evaluation.  Patient declines activity at this time reporting dizziness, nausea and vomiting.  Patient reports desire for activity, OOB and eventually ambulation but declines all offers for activity at this time.  Patient requesting therapy follow up tomorrow.    Kristie Urena MS OTR/L    
Occupational therapy note:  Orders received, chart reviewed.  Patient noted with supratherapeutic INR of 11.8 and hypotension.  Therapy will hold and follow up.  Kristie Urena MS OTR/L    
Patient is seen and examined at bedside.  She just finished the PD treatment.  She has generalized anasarca.  Blood pressure is slightly low.  Continue midodrine and Florinef.  Continue CCPD: Change Onur solution to 4.25% dextrose with 2.5%.  
Physical Therapy    Consult received, chart reviewed.  Note supra therapeutic INR of 11.8 as well as persistent hypotension 78/49.  Will defer and follow once stable and appropriate.    Eugenia Maciel MS, PT  
Physical Therapy Note:    PT evaluation deferred. Pt adamantly declines all therapy interventions today stating she \"can't do anything.\" Pt educated regarding role of PT and importance of participation. She states personal goal of ambulating as she did at prior visit per chart review. She reports recently sitting and sleeping in lift chair at all times except for bedside commode transfers with family assistance. She reports increasing B shoulder pain due to techniques for lifting assistance. She was receiving HHPT.    During last admission and PT evaluation 3/28/24 pt reported performing mostly wheelchair use for mobility and transferring with assist x2 from her  and son. She had reportedly been walking short distances using a RW with HHPT. During that admission she performed bed to chair transfers and ambulation 1-3' with min A to CGA; mod A for bed mobility. She was discharged to home on 4/3/24 with HH follow-up.    Vi Christensen, PT, DPT, CEEAA  8 mins    
Physical Therapy:  0913  Order received and acknowledged. Chart reviewed. Spoke with nursing prior to attempting to see the patient for PT evaluation/treatment this day.ok to proceed per nursing. Patient refusing to get up stating \"I am so dizzy, I just can't get up because I have been spitting up all morning and too dizzy to do anything\"  Patient verbalizing her goal to walk again. States she is in recliner sit to stand chair for sleeping most of the time. Max A x 2 for transfers to  (\"I haven't done that much lately).  Interested in a mechanical lift for home to reduce family pulling on arm/shoulders as she describes pain in both shoulders.    During last admission PT evaluation 3/28/24 pt reported performing mostly wheelchair use for mobility and transferring with assist x2 from her  and son. She had reportedly been walking short distances using a RW with HHPT. During that admission she performed bed to chair transfers and ambulation 1-3' with min A to CGA; mod A for bed mobility. She was discharged to home on 4/3/24 with  follow-up.     Amy Lombardo, PT, DPT, CLT   
Pt transferred earlier in shift and RT just notified of noc bipap orders. RT at bedside to speak to pt about going on machine. Pt says she is nauseous and actively vomiting and spitting up. Bipap held due to nausea.     RN aware and will reach out to RT if needed. Pt currently on 2L nc with 100% spo2 reading.   
Rapid Called at 0128    Responded to RRT at 0130 for Tachycardia    Provider at bedside: YES  Interventions ordered: STAT Chest XR, Labs, EKG, and Other (Comment), meds  Sepsis Suspected: No  Transfer to Higher Level of Care: no  Blood Glucose: 249     Vitals:    06/04/24 0159   BP: (!) 130/103   Pulse: 88   Resp:    Temp:    SpO2:       Pt reported with increased heart rate after getting on bedpan several times during shift. Pt HR sustained 140's and rapid called. Pt alert, oriented, bilat lower ext edema and n,v. Pt reports chest tightness 8/10 Pt hr 140's,metoprolol IV given as IVP over 5 minutes. Pt HR decreased to 90 and pt reports relief with chest tightness and palpitations.  Rapid Ended at 200  RRT RN assisted with transport to accepting unit NoRicardo Almeida RN   
[facility] Dosing Services: 6/1/2024  Consult for Warfarin Dosing by Pharmacy by Dr. Bowen  Consult provided for this 68 y.o.F , for indication of history of DVT   Day of Therapy: resumed from outpatient (per records patient has been on 1mg Sun, Mon, Tues, Wed, Thur, Sat and 2.5mg on Friday)  Goal INR 2-3    5/30 INR >13.9 - warfarin held  5/31 INR 11.8- warfarin held  6/1 INR 5.6    Discussed above with MD Neves - H/H stable. OK for low dose warfarin 0.5 mg (50% home dose) tonight to avoid precipitous drop in INR s/p vitamin K. Continue w/ daily INR and CBC monitoring.     Significant drug interactions, such as enoxaparin: Ceftriaxone, Metronidazole, Vitamin K 5mg given 5/30, Allopurinol (chronic), Fludrocortisone (chronic)    PT/INR Lab Results   Component Value Date/Time    INR 5.6 06/01/2024 03:38 AM    INR 1.8 05/03/2023 02:34 PM    INREXT 4.1 05/20/2021 12:00 AM      Platelets Lab Results   Component Value Date/Time     06/01/2024 03:38 AM      H/H Lab Results   Component Value Date/Time    HGB 11.6 06/01/2024 03:38 AM        Thanks,   David Jean, PharmD          
Spiritual Care Assessment/Progress Note  [facility]    Name: Elizabeth Hopkins MRN: [id number]    Age: 68 y.o.     Sex: female   Language: English     Date: 6/2/2024            Total Time Calculated: 35 min              Spiritual Assessment begun in [facility] A4 INTENSIVE CARE UNIT  Service Provided For: Patient     Encounter Overview/Reason: Initial Encounter    Spiritual beliefs:      [x] Involved in a clint tradition/spiritual practice:      [x] Supported by a clint community:      [] Claims no spiritual orientation:      [] Seeking spiritual identity:           [] Adheres to an individual form of spirituality:      [] Not able to assess:                Identified resources for coping and support system:   Support System: Spouse, Children, Family members       [x] Prayer                  [] Devotional reading               [] Music                  [] Guided Imagery     [] Pet visits                                        [] Other: (COMMENT)     Specific area/focus of visit   Encounter:    Crisis:    Spiritual/Emotional needs: Type: Spiritual Support  Ritual, Rites and Sacraments:    Grief, Loss, and Adjustments:    Ethics/Mediation:    Behavioral Health:    Palliative Care:    Advance Care Planning:           Narrative:   Spiritual care assessment with Mrs. Elizabeth Hopkins on ICU floor.  Reviewed chart and met Pt bedside; she is awake, alert, but not feeling physically well. Pt warmly welcomes visit. She shares that she has 3 grown children in the area but she says they are not much help for her. She also attends a local [employer] but not much help there either she said except for one other [employer] mother. However, she shared that she is alright with that, that she talks to God all the time and that's most important, she said. Other than physically sick, she is emotionally feeling okay with being here. Her  is on his way in and supportive. Her only request is for prayer.  listened 
[facility] Dosing Services: 5/30/24   Consult for Warfarin Dosing by Pharmacy by Dr. Bowen  Consult provided for this 68 y.o.F , for indication of history of DVT   Day of Therapy: resumed from outpatient (per records patient has been on 1mg Sun, Mon, Tues, Wed, Thur, Sat and 2.5mg on Friday)    INR =11.8 (down from 13.9 s/p Vitamin K 5mg PO x 1 5/30 pm)   Prior INR outpatient was 3.8 on 5/1/24    Dose to achieve an INR goal of 2-3    Order entered to HOLD warfarin today due to supratherapeutic INR.     Significant drug interactions, such as enoxaparin: Ceftriaxone, Metronidazole, Vitamin K 5mg given 5/30, Allopurinol (chronic), Fludrocortisone (chronic?)    PT/INR Lab Results   Component Value Date/Time    INR 11.8 05/31/2024 05:52 AM    INR 1.8 05/03/2023 02:34 PM    INREXT 4.1 05/20/2021 12:00 AM      Platelets Lab Results   Component Value Date/Time     05/31/2024 05:52 AM      H/H Lab Results   Component Value Date/Time    HGB 11.7 05/31/2024 05:52 AM        Pharmacy to follow daily and will provide subsequent Warfarin dosing based on clinical status.  Yarelis Gutierres [facility]) Contact information [phone]        
  - Miralax qd     Cirrhosis recently diagnosed. Has an upcoming appointment to establish with hepatology. Normal ALT, AST. ^238. Albumin 1.8.  - f/u OP as scheduled      ILD/ VERÓNICA follows with Dr. Chandler at [facility]. 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 On home 3L oxygen at all times.  - Continue home CPAP     CAD, HFpEF, atypical angina: s/p PCI x4. EF 6.0-6.5% in 03/24.   - Continue Lipitor 80mg qd  - Strict I&Os     Chronic back pain:  - Continue home Percocet 10-325mg TID prn     GERD: Stable  - Continue home protonix 40mg daily     Obesity:  - PT with BMI of Body mass index is 43.15 kg/m².  - Encouraging lifestyle modifications and further follow up outpatient.    Patient seen and discussed with Renzo Garland MD Sydney A Sykes, MD  Family Medicine Resident       For Billing    Chief Complaint   Patient presents with    Rectal Bleeding    Leg Swelling             
(KLOR-CON M) 20 MEQ extended release tablet Take 1 tablet by mouth daily 9/15/22   Automatic Reconciliation, Ar       Allergies   Allergen Reactions    Iodine Anaphylaxis     Tolerates when pre medicated w/ IV solumedrol and benadryl prior to procedure     Shellfish Allergy Anaphylaxis    Levofloxacin Nausea And Vomiting    Morphine Hives and Itching    Reglan [Metoclopramide] Itching     Vomiting, TKD    Nsaids Nausea And Vomiting       Social Hx:  reports that she quit smoking about 9 years ago. Her smoking use included cigarettes. She started smoking about 53 years ago. She has a 22.0 pack-year smoking history. She has never used smokeless tobacco. She reports that she does not drink alcohol and does not use drugs.     Family History   Problem Relation Age of Onset    Anesth Problems Neg Hx     Heart Disease Mother     No Known Problems Daughter     No Known Problems Son     No Known Problems Son        Review of Systems:  A twelve point review of system was performed today. Pertinent positives and negatives are mentioned in the HPI. The reminder of the ROS is negative and noncontributory.     Objective:    Vitals:    Vitals:    06/01/24 0600 06/01/24 0615 06/01/24 0630 06/01/24 0808   BP: (!) 108/58 (!) 98/56 (!) 94/55    Pulse: 74 65 55    Resp: 14 12 10 15   Temp:       TempSrc:       SpO2:  97% 97%    Weight: (!) 136.4 kg (300 lb 11.3 oz)      Height:         I&O's:  05/31 0701 - 06/01 0700  In: 112.5 [I.V.:23]  Out: 377   BP (!) 94/55   Pulse 55   Temp 98 °F (36.7 °C) (Axillary)   Resp 15   Ht 1.778 m (5' 10\")   Wt (!) 136.4 kg (300 lb 11.3 oz)   SpO2 97%   BMI 43.15 kg/m²     Physical Exam:    GENERAL : Lying down in bed with no acute distress  HEENT: AT NC PEERLA   NECK: Supple no JVP  CVS: tachycardic  RS: diminished  ABDOMEN: soft NT ND positive BS  EXTREMITY: +3 pitting  NEUROLOGY: AAA X3, no focal deficit or asterixis      Laboratory Results:      Lab Results   Component Value Date/Time    NA 
dextrose, ondansetron, gentamicin, sodium chloride flush, sodium chloride, acetaminophen **OR** acetaminophen, levalbuterol, oxyCODONE-acetaminophen      Current Nutrition Intake & Therapies:    Average Meal Intake: Refusing to eat  Average Supplements Intake: None Ordered  ADULT DIET; Regular; Low Sodium (2 gm); Low Potassium (Less than 3000 mg/day); Low Phosphorus (Less than 1000 mg); 1200 ml    Anthropometric Measures:  Height: 177.8 cm (5' 10\")  Ideal Body Weight (IBW): 150 lbs (68 kg)       Current Body Weight: 136.4 kg (300 lb 11.3 oz), 200.5 % IBW. Weight Source: Stated  Current BMI (kg/m2): 43.1  Usual Body Weight: 117.9 kg (260 lb)  % Weight Change (Calculated): 15.7  Weight Adjustment For: No Adjustment                 BMI Categories: Obese Class 3 (BMI 40.0 or greater)    Estimated Daily Nutrient Needs:  Energy Requirements Based On: Kcal/kg  Weight Used for Energy Requirements: Ideal  Energy (kcal/day): 2045 (30 kcal/kg IBW)  Weight Used for Protein Requirements: Ideal  Protein (g/day): 136-170 (2.0-2.5 g/kg IBW)  Method Used for Fluid Requirements: 1 ml/kcal  Fluid (ml/day): 2045    Nutrition Diagnosis:   Increased nutrient needs related to renal dysfunction as evidenced by dialysis  Moderate malnutrition related to inadequate protein-energy intake as evidenced by poor intake prior to admission, Criteria as identified in malnutrition assessment, nausea, vomiting, localized or generalized fluid accumulation    Nutrition Interventions:   Food and/or Nutrient Delivery: Continue Current Diet  Nutrition Education/Counseling: Survival skills/brief education completed  Coordination of Nutrition Care: Continue to monitor while inpatient  Plan of Care discussed with: patient, nursing    Goals:     Goals: PO intake 50% or greater, by next RD assessment       Nutrition Monitoring and Evaluation:   Behavioral-Environmental Outcomes: None Identified  Food/Nutrient Intake Outcomes: Food and Nutrient Intake  Physical 
extended release tablet Take 1 tablet by mouth daily 9/15/22   Automatic Reconciliation, Ar       Allergies   Allergen Reactions    Iodine Anaphylaxis     Tolerates when pre medicated w/ IV solumedrol and benadryl prior to procedure     Shellfish Allergy Anaphylaxis    Levofloxacin Nausea And Vomiting    Morphine Hives and Itching    Reglan [Metoclopramide] Itching     Vomiting, TKD    Nsaids Nausea And Vomiting       Social Hx:  reports that she quit smoking about 9 years ago. Her smoking use included cigarettes. She started smoking about 53 years ago. She has a 22.0 pack-year smoking history. She has never used smokeless tobacco. She reports that she does not drink alcohol and does not use drugs.     Family History   Problem Relation Age of Onset    Anesth Problems Neg Hx     Heart Disease Mother     No Known Problems Daughter     No Known Problems Son     No Known Problems Son        Review of Systems:  A twelve point review of system was performed today. Pertinent positives and negatives are mentioned in the HPI. The reminder of the ROS is negative and noncontributory.     Objective:    Vitals:    Vitals:    06/03/24 0748 06/03/24 0800 06/03/24 0900 06/03/24 1000   BP:  112/84 121/74 (!) 97/51   Pulse:  95 98 (!) 102   Resp:  16 18 23   Temp:  97.9 °F (36.6 °C)     TempSrc:  Oral     SpO2:  100% 100% 100%   Weight:       Height: 1.778 m (5' 10\")        I&O's:  06/02 0701 - 06/03 0700  In: 613.6 [I.V.:1]  Out: 2184   BP (!) 97/51   Pulse (!) 102   Temp 97.9 °F (36.6 °C) (Oral)   Resp 23   Ht 1.778 m (5' 10\")   Wt (!) 136.4 kg (300 lb 11.3 oz)   SpO2 100%   BMI 43.15 kg/m²     Physical Exam:    GENERAL : Lying down in bed with no acute distress  HEENT: AT NC PEERLA   NECK: Supple no JVP  CVS: tachycardic  RS: diminished  ABDOMEN: soft NT ND positive BS  EXTREMITY: +3 pitting  NEUROLOGY: AAA X3, no focal deficit or asterixis      Laboratory Results:      Lab Results   Component Value Date/Time

## 2024-06-04 NOTE — PLAN OF CARE
Problem: Occupational Therapy - Adult  Goal: By Discharge: Performs self-care activities at highest level of function for planned discharge setting.  See evaluation for individualized goals.  Description: FUNCTIONAL STATUS PRIOR TO ADMISSION: Patient reports a history of chronic back pain due to DDD and b/l shoulder pain due severe OA. She also reports she has had 3 R knee surgeries since Aug 2023 (R TKA) due to her body rejecting hardware after a knee replacement and this has resulted in reduced mobility. She had a recent 3 week hospital stay in March 2024 and returned home for ~1 week before re-admission in April 2024. Prior to those admissions, she was ambulating some using a rollator in HHPT but otherwise was using a wheelchair or motorized scooter to mobilize with assistance for transfers. She now is mostly wheelchair bound but endorses walking only short distances using RW with HH therapy. Her wheelchair fits in her bathroom and she required assistance with tub transfer bench and toilet transfers (with BSC over toilet frame). She did not require ADL assist outside of transfers at baseline, but she now requires increased assistance for all LB ADLs. Sleeps in a lift chair/recliner.    HOME SUPPORT: Patient resided at home with her  and son to provide assistance.    Outcome: Progressing     OCCUPATIONAL THERAPY EVALUATION    Patient: Elizabeth Hopkins (68 y.o. female)  Date: 6/3/2024  Primary Diagnosis: Lactic acidosis [E87.20]  Proctitis [K62.89]  Peripheral edema [R60.0]  Leg swelling [M79.89]  Supratherapeutic INR [R79.1]  Hemorrhoids, unspecified hemorrhoid type [K64.9]         Precautions:                    ASSESSMENT :  The patient is limited by decreased functional mobility, independence in ADLs, high-level IADLs, ROM, strength, activity tolerance, endurance, balance, increased pain levels on day 4 of hospital admission for lactic acidosis. Pt today seen in ICU, cleared for activity.    Based 
  Problem: Safety - Adult  Goal: Free from fall injury  Outcome: Adequate for Discharge     Problem: Discharge Planning  Goal: Discharge to home or other facility with appropriate resources  Outcome: Adequate for Discharge     Problem: Pain  Goal: Verbalizes/displays adequate comfort level or baseline comfort level  Outcome: Adequate for Discharge     Problem: Skin/Tissue Integrity  Goal: Absence of new skin breakdown  Description: 1.  Monitor for areas of redness and/or skin breakdown  2.  Assess vascular access sites hourly  3.  Every 4-6 hours minimum:  Change oxygen saturation probe site  4.  Every 4-6 hours:  If on nasal continuous positive airway pressure, respiratory therapy assess nares and determine need for appliance change or resting period.  Outcome: Adequate for Discharge     Problem: ABCDS Injury Assessment  Goal: Absence of physical injury  Outcome: Adequate for Discharge     Problem: Chronic Conditions and Co-morbidities  Goal: Patient's chronic conditions and co-morbidity symptoms are monitored and maintained or improved  Outcome: Adequate for Discharge     Problem: Respiratory - Adult  Goal: Achieves optimal ventilation and oxygenation  Outcome: Adequate for Discharge     Problem: Nutrition Deficit:  Goal: Optimize nutritional status  Outcome: Adequate for Discharge     
  Problem: Safety - Adult  Goal: Free from fall injury  Outcome: Progressing     Problem: Discharge Planning  Goal: Discharge to home or other facility with appropriate resources  Outcome: Progressing  Flowsheets (Taken 6/3/2024 0800)  Discharge to home or other facility with appropriate resources: Identify barriers to discharge with patient and caregiver     Problem: Pain  Goal: Verbalizes/displays adequate comfort level or baseline comfort level  Outcome: Progressing  Flowsheets (Taken 6/3/2024 0800)  Verbalizes/displays adequate comfort level or baseline comfort level: Encourage patient to monitor pain and request assistance     Problem: Skin/Tissue Integrity  Goal: Absence of new skin breakdown  Description: 1.  Monitor for areas of redness and/or skin breakdown  2.  Assess vascular access sites hourly  3.  Every 4-6 hours minimum:  Change oxygen saturation probe site  4.  Every 4-6 hours:  If on nasal continuous positive airway pressure, respiratory therapy assess nares and determine need for appliance change or resting period.  Outcome: Progressing     Problem: ABCDS Injury Assessment  Goal: Absence of physical injury  Outcome: Progressing     Problem: Chronic Conditions and Co-morbidities  Goal: Patient's chronic conditions and co-morbidity symptoms are monitored and maintained or improved  Outcome: Progressing     Problem: Respiratory - Adult  Goal: Achieves optimal ventilation and oxygenation  6/3/2024 1022 by Kalani Sosa, RN  Outcome: Progressing  Flowsheets (Taken 6/3/2024 0800)  Achieves optimal ventilation and oxygenation: Assess for changes in respiratory status  6/3/2024 0323 by Jeanine Montague, RT  Outcome: Progressing     Problem: Nutrition Deficit:  Goal: Optimize nutritional status  Outcome: Progressing     
months)  Transfer Assistance: Independent  Active : No    Cognitive/Behavioral Status:                    Strength:    Strength: Generally decreased, functional    Tone & Sensation:   Tone: Normal  Sensation: Intact    Coordination:  Coordination: Within functional limits    Range Of Motion:  AROM: Generally decreased, functional       Functional Mobility:  Bed Mobility:     Bed Mobility Training  Bed Mobility Training: Yes  Sit to Supine: Moderate assistance;Assist X2  Scooting: Moderate assistance;Assist X2 (at EOB)  Transfers:     Transfer Training  Transfer Training: Yes  Sit to Stand: Maximum assistance;Total assistance;Assist X2 (cleared buttocks only 4-5 inches using Daysi Stedy)  Balance:               Balance  Sitting: Intact  Standing: Impaired  Standing - Static: Poor  Standing - Dynamic: Poor  Ambulation/Gait Training:                                                                                                                                                                                                                                                                                        [facility]®      Basic Mobility Inpatient Short Form (6-Clicks) Version 2    How much help is needed turning from your back to your side while in a flat bed without using bedrails?: A Lot  How much help is needed moving from lying on your back to sitting on the side of a flat bed without using bedrails?: A Lot  How much help is needed moving to and from a bed to a chair?: Total  How much help is needed standing up from a chair using your arms?: Total  How much help is needed walking in hospital room?: Total  How much help is needed climbing 3-5 steps with a railing?: Total    AM-PAC Inpatient Mobility Raw Score : 8  AM-PAC Inpatient T-Scale Score : 28.52     Cutoff score ?171,2,3 had higher odds of discharging home with home health or need of SNF/IPR.    1. She Parra, Wong Katz

## 2024-06-04 NOTE — CARE COORDINATION
6/4/2024   CARE MANAGEMENT NOTE:  Pt transferred from ICU to the 5th floor.  EMR reviewed and handoff received from previous  ([facility]).  Pt was admitted with septic shock, gastroparesis, ESRD on PD.  Reportedly, pt lives with her  and son.    RUR 24%    Transition Plan of Care:  Nephrology, GI following for medical management  Plan is for pt to return home with her family  [facility] (PT/OT) resumption of care has been arranged.  CM notified agency of pt's discharge and AVS/summary was sent to them.  ESRD - pt on PD at home  Outpatient follow up  Hospital to Home Medicare stretcher was arranged for 4:30 p.m.    No further post discharge needs indicated.  Anival  
Hand-off received from ED case manager.  Pt was open to [facility] prior to hospitalization.  I saw were ED had faxed orders to [facility].  I also sent updated clinicals to [facility].    Tegan Solorzano RN  
Transition of Care Plan:    RUR: 24%  Prior Level of Functioning: home health through [facility]  Disposition: return home with home health through [facility]     Pt sees Dr Parker @ [facility].    At home,pt is essentially wheelchair bound for mobility .  Her son and  both assist pt with transferring @  home.  Pt uses a recliner @ home to sleep (has sit to stand chair capacities)  With home health services,pt sees home health PT and OT .  Home health PT and OT assist pt with using her rolling walker to achieve walking short distances.    Resumption orders have been sent to [facility]) for resumption of home health when pt is stable to be discharged.    Tegan Solorzano RN  
uses an electric scooter and is able to stand and pivot to her scooter or bsc.  Patient reports that she has not been walking.  She sleeps in a recliner.  Her spouse assists with all housework and  assists with lower body dressing- \"both my rotator cuffs are torn and I have arthritis.\"  She has a rehab history at [facility] and the [facility].  Patient noted, \"I don't want to go anywhere.  I want to go home.\"  Patient is currently open with [facility] (PT/OT).  Patient has home O2 from [employer] and is on 2L at baseline.  Her preferred pharmacy is [facility] on [address]  Patient will need HH resumption orders prior to discharge.

## 2024-06-05 LAB
BACTERIA SPEC CULT: NORMAL
BACTERIA SPEC CULT: NORMAL
EKG ATRIAL RATE: 109 BPM
EKG ATRIAL RATE: 145 BPM
EKG DIAGNOSIS: NORMAL
EKG DIAGNOSIS: NORMAL
EKG P AXIS: 30 DEGREES
EKG P-R INTERVAL: 136 MS
EKG P-R INTERVAL: 174 MS
EKG Q-T INTERVAL: 262 MS
EKG Q-T INTERVAL: 318 MS
EKG QRS DURATION: 76 MS
EKG QRS DURATION: 80 MS
EKG QTC CALCULATION (BAZETT): 406 MS
EKG QTC CALCULATION (BAZETT): 428 MS
EKG R AXIS: 14 DEGREES
EKG R AXIS: 14 DEGREES
EKG T AXIS: 155 DEGREES
EKG T AXIS: 163 DEGREES
EKG VENTRICULAR RATE: 109 BPM
EKG VENTRICULAR RATE: 145 BPM
SERVICE CMNT-IMP: NORMAL
SERVICE CMNT-IMP: NORMAL

## 2024-06-06 DIAGNOSIS — E87.1 HYPONATREMIA: ICD-10-CM

## 2024-06-06 DIAGNOSIS — N18.6 ESRD ON PERITONEAL DIALYSIS (HCC): ICD-10-CM

## 2024-06-06 DIAGNOSIS — E11.59 DM TYPE 2 CAUSING VASCULAR DISEASE (HCC): ICD-10-CM

## 2024-06-06 DIAGNOSIS — G47.33 OSA ON CPAP: ICD-10-CM

## 2024-06-06 DIAGNOSIS — R79.1 SUPRATHERAPEUTIC INR: ICD-10-CM

## 2024-06-06 DIAGNOSIS — E11.43 DIABETIC GASTROPARESIS (HCC): ICD-10-CM

## 2024-06-06 DIAGNOSIS — K21.9 GASTROESOPHAGEAL REFLUX DISEASE, UNSPECIFIED WHETHER ESOPHAGITIS PRESENT: ICD-10-CM

## 2024-06-06 DIAGNOSIS — I50.30 HEART FAILURE WITH PRESERVED EJECTION FRACTION, UNSPECIFIED HF CHRONICITY (HCC): ICD-10-CM

## 2024-06-06 DIAGNOSIS — K31.84 DIABETIC GASTROPARESIS (HCC): ICD-10-CM

## 2024-06-06 DIAGNOSIS — K62.89 PROCTITIS: Primary | ICD-10-CM

## 2024-06-06 DIAGNOSIS — I48.20 CHRONIC ATRIAL FIBRILLATION (HCC): ICD-10-CM

## 2024-06-06 DIAGNOSIS — I27.20 PULMONARY HYPERTENSION (HCC): ICD-10-CM

## 2024-06-06 DIAGNOSIS — Z99.2 ESRD ON PERITONEAL DIALYSIS (HCC): ICD-10-CM

## 2024-06-10 PROBLEM — J18.9 HAP (HOSPITAL-ACQUIRED PNEUMONIA): Status: ACTIVE | Noted: 2024-01-01
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
## 2024-06-10 NOTE — CONSULTS
Name: Elizabeth Hopkins MRN: [id number]   : 1955 Hospital: [facility]   Date: 6/10/2024        Impression Plan   Septic shock  Abdominal pain- gastroparesis vs. Peritonitis vs other intestinal   Encephalopathy  Leukocytosis  Morbid obesity  Chronic respiratory failure               Continue levophed gtt with goal MAP of >65  PRN low dose dilaudid for pain  IV antibiotics  Limited in fluid admin due to being a HD pt  Analysis of acites fluid including cell count  Follow up blood cultures  Continue midodrine  Hydrocortisone IV  Currently on home O2 amount  Continue coumadin (Pt currently therapeutic)  Protonix       Pt is critically ill. Critical care time spent with pt exclusive of procedures was 43 minutes    Radiology  ( personally reviewed) CT abdomen reviewed: small bilateral pleural effusions and associated atelectasis.   CXR: small pleural effusions   ABG Invalid input(s): \"PHI\", \"PO2I\", \"PCO2I\"       Subjective     This patient has been seen and evaluated at the request of Dr. New for septic shock.  Patient is a 68 y.o. female with PMHx of chronic respiratory failure, ILD?, VTE. gastroparesis and peritoneal HD presenting with increasing abdominal pain and vomiting. Found to be hypotensive and placed on levophed. Fluid administration was judicial due to pt being a HD pt.  reports that they are meticulous with peritoneal catheter and that he has checked the fluid making sure it looks clear. The family feels that her abdominal pain is consistent with her gastroparesis pain, however it sounds like the pain has increased over the last couple of weeks. Pt was recently admitted to [facility] for supra therapeutic INR of 13.9. At that time she was treated for proctitis with septic shock. Blood cxs and ascites did not grow anything at that time. Pt had just received Ketamine for pain and unable to answer questions.     Review of Systems:  Review of systems not obtained due to  1655  EXAM:   GENERAL: somnolent, moaning HEENT:  PERRL, EOMI, no alar flaring or epistaxis, oral mucosa moist without cyanosis, NECK:  no jugular vein distention, no retractions, no thyromegaly or masses, LUNGS: decreased breath sounds billaterally and with rhonchi , HEART:  Regular rate and rhythm with no MGR; no edema is present, ABDOMEN:  soft with no tenderness, bowel sounds present, EXTREMITIES:  warm with no cyanosis, SKIN:  no jaundice or ecchymosis, and NEUROLOGIC:  alert and oriented, grossly non-focal    Tiny De León MD

## 2024-06-10 NOTE — ED PROVIDER NOTES
[facility] EMERGENCY DEPT  EMERGENCY DEPARTMENT ENCOUNTER      Pt Name: Elizabeth Hopkins  MRN: [id number]  Birthdate 1955  Date of evaluation: 6/10/2024  Provider: Jud Gutierrez MD    CHIEF COMPLAINT       Chief Complaint   Patient presents with    Abdominal Pain     HISTORY OF PRESENT ILLNESS   (Location/Symptom, Timing/Onset, Context/Setting, Quality, Duration, Modifying Factors, Severity)  Note limiting factors.   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  Patient reports symptoms began over the past day, feels like her previous gastroparesis.  She denies any fevers.  Patient apparently also complained of chest pain or shortness of breath during nursing triage.     Review of External Medical Records:     Nursing Notes were reviewed.    REVIEW OF SYSTEMS    (2-9 systems for level 4, 10 or more for level 5)     Review of Systems   All other systems reviewed and are negative.      Except as noted above the remainder of the review of systems was reviewed and negative.       PAST MEDICAL HISTORY     Past Medical History:   Diagnosis Date    Aortic aneurysm (HCC)     Abdominal    Chronic kidney disease     Hemodiaylsis  3x/week    Chronic pain     CKD (chronic kidney disease) stage 4, GFR 15-29 ml/min ([facility]) 02/10/2017    Coronary atherosclerosis of native coronary artery 02/16/2011    Diabetic gastroparesis (HCC)     Diastolic heart failure (HCC) 10/05/2012    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 allred    G6PD deficiency

## 2024-06-10 NOTE — H&P
Unremarkable.  SMALL BOWEL: No dilatation or wall thickening.  COLON: No dilatation or wall thickening.  APPENDIX: Not identified  PERITONEUM: Peritoneal dialysis catheter is noted in position with increase in  ascites  RETROPERITONEUM: No lymphadenopathy or aortic aneurysm. There are extensive  atherosclerotic changes  REPRODUCTIVE ORGANS: Status post hysterectomy  URINARY BLADDER: No mass or calculus.  BONES: No destructive bone lesion.  ABDOMINAL WALL: No mass or hernia.  ADDITIONAL COMMENTS: There is subcutaneous edema.    Impression  1. There are small bilateral effusions. There is increasing bibasilar  atelectasis/airspace disease right greater than left.  2. There are extensive atherosclerotic changes. There is extensive coronary  artery calcification..  3. Peritoneal dialysis catheter is noted in position in the pelvis with  increasing ascites more pronounced in the right upper quadrant.  4. Bilateral renal cysts and renal calculi are unchanged.  5. There is extensive subcutaneous edema.    Electronically signed by Chad Villegas         Assessment and Plan     Elizabeth Hopkins is a 68 y.o. female with a PMHx of ESRD on PD, hemorrhoids, CAD, VTE on Warfarin, ILD and recent admission a week ago for proctitis who is admitted for septic shock.    Septic Shock: POA MAPs dropping to the 40s despite partial fluid challenge with 1L bolus in ED ( full bolus not given d/t ESRD, HFpEF). Also elevated WBC of 19 and LA 4.61. Possible causes of sepsis include possible SBP given worsening abdominal pain with increasing ascites, hx of cirrhosis, PD and recent intraabdominal infection, also possible airspace Dx and multiple bed wounds. CT w/ bibasilar airspace disease, increased ascites, subQ edema. Lipase 1624. TSH nl. BNP 4807. Ammonia <10. Started on Stress dose steroids, Levophed gtt in ED, vanc and zosyn.   - Admit to ICU with continuous cardiac monitoring   - Monitor vitals per unit protocol  - Strict I&O  - O2 prn,  wean as tolerated  - Intensivist consulted, appreciate recs   -Stress-dose steroids   -Continue IV pressor support with levophed gtt, target MAP >65   -Midodrine 15 mg tid  - Florinef 0.1 mg bid  - Continue IV Vancomycin, Zosyn (6/10 -   - Repeat lactate  - Repeat troponin  - Ascitic fluid studies pending: culture, gram stain, cell count w/ diff, albumin, LDH, total protein, glucose, amylase  - Follow Blood cultures  - Daily CBC, CMP, Mg  - Consult Nephrology for PD  - Wound care consulted  - SUP w/ IV Protonix 40mg daily while on stress-dose steroids  - Pain management: tylenol prn, IV dilaudid prn for severe pain  - NPO    Worsening abdominal pain in s/o gastroparesis: worsening abdominal pain. Could be multifactorial given chronic gastroparesis which was present on last admission, also possible SBP as above, pancreatitis given elevated lipase. On PO erythromycin  - Hold PO erythromycin  - Management for possible SBP as above  - Protonix as above  - NPO and pain management as above for possible pancreatitis    Anasarca/ESRD on PD/HFpEF:  follows with Dr. Parker. Unable to fully fluid resuscitate iso severe sepsis given as she is third spacing.   - Nephro consulted, appreciate recs    Supratherapeutic INR /Hx of VTE: On warfarin for H/o VTE. S/p Vit K in ED. POA INR 3.1  - Hold AC  - Pharmacy consulted    Thrombocytopenia . Liver dx could be contributory  - Hold AC as above    CAD, HFpEF, atypical angina: s/p PCI x4. EF 6.0-6.5% in 03/24.   - Continue Metoprolol 25mg qd and Lipitor 80mg qd  - Hold NGT iso low BP  - Caution with IVF  - Strict I&Os    ILD/ VERÓNICA follows with Dr. Chandler at [facility]. 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 On home 3L oxygen at all times.  - Continue home CPAP    Cirrhosis recently diagnosed. Has an upcoming appointment to establish with hepatology. Normal ALT, AST. ^238. Albumin 1.8.  - f/u OP as scheduled     Hemorrhoids: Chronic, no bleeding since

## 2024-06-10 NOTE — ED NOTES
TRANSFER - OUT REPORT:    Verbal report given to Alyssa on Elizabeth Hopkins  being transferred to [facility] for routine progression of patient care       Report consisted of patient's Situation, Background, Assessment and   Recommendations(SBAR).     Information from the following report(s) Nurse Handoff Report, Index, ED Encounter Summary, ED SBAR, Adult Overview, MAR, and Neuro Assessment was reviewed with the receiving nurse.    Waldo Fall Assessment:    Presents to emergency department  because of falls (Syncope, seizure, or loss of consciousness): No  Age > 70: No  Altered Mental Status, Intoxication with alcohol or substance confusion (Disorientation, impaired judgment, poor safety awaremess, or inability to follow instructions): No  Impaired Mobility: Ambulates or transfers with assistive devices or assistance; Unable to ambulate or transer.: Yes  Nursing Judgement: Yes          Lines:   Peripheral IV 06/10/24 Right Antecubital (Active)       Peripheral IV 06/10/24 Right;Ventral Cephalic (Active)        Opportunity for questions and clarification was provided.      Patient transported with:  Monitor, O2 @ 3lpm, and Registered Nurse

## 2024-06-10 NOTE — ED TRIAGE NOTES
Pt arrives via EMS for complaints diffuse abdominal pain and gastroparesis. Pt lethargic and resposive to voice.     Pt states last BM was 5 days ago.     Pt states that she has had a lot of output from dialysis port. Pt states that she received dialysis last night.      EMS reports hypotension, last SBP in the 60's    Pt also states she has chest pain and shortness of breath.

## 2024-06-10 NOTE — PROGRESS NOTES
[facility] Dosing Services: Antimicrobial Stewardship Daily Doc  Consult for antibiotic dosing of Vancomycin by Dr. Tarango  Indication: Sepsis 2/2 HAP  Day of Therapy: 1/14  ESRD on PD    Ht Readings from Last 1 Encounters:   06/10/24 1.778 m (5' 10\")        Wt Readings from Last 1 Encounters:   06/10/24 (!) 136.3 kg (300 lb 8 oz)      Vancomycin therapy:  Loading dose: Vancomycin 2500 mg x1 dose now/given  Maintenance dose: Vancomycin dosing per levels due to PD   Dose calculated to approximate a           a. Target AUC/HORACE of 400-600          b. Trough of 15-20 mcg/mL   Last level: in 3 days   Plan: Continue same  Dose administration notes: Doses given appropriately as scheduled    Non-Kinetic Antimicrobial Dosing Regimen:   Current Regimen:  Zosyn 3.375 gm IV q12hr  Recommendation: continue same  Dose administration notes: Doses given appropriately as scheduled    Other Antimicrobial   (not dosed by pharmacist) none   Cultures pending   Serum Creatinine Lab Results   Component Value Date/Time    CREATININE 5.39 06/10/2024 01:58 PM          Creatinine Clearance On PD     Temp Temp: 97.5 °F (36.4 °C) (Oral)       WBC Lab Results   Component Value Date/Time    WBC 19.0 06/10/2024 01:58 PM          Procalcitonin Lab Results   Component Value Date/Time    PROCAL 0.55 05/30/2024 05:09 PM      For Antifungals, Metronidazole and Nafcillin: Lab Results   Component Value Date/Time    ALT 14 06/10/2024 01:58 PM    AST 17 06/10/2024 01:58 PM        Pharmacist  Maddie Oliver, PharmD   [phone]      70 y/o M pmh PVD, DM, COPD presents for right foot wound and cellulitis. At time of consult, VSS, WBC 10.66, CRP 43.3, ESR 85. R foot x-ray wet read with cortical erosions of 5th metatarsal head concerning for osteomyelitis. High suspicion for Osteomyelitis of Right foot.     Plan:   - C/w IV abx  - Recommend MRI of R foot to evaluate extent of osteomyelitis  - Excisional debridement of Left foot hallux lesion with #15 blade down to and including level of dermis   - F/u Left foot x-rays  - CT/X-Rays reviewed. F/u final reads   - Local wound care: wounds cleansed with normal saline. Betadine DSD applied to Right foot wound and WTD dressing applied to R foot wound.   - Pt should heel WBAT to RLE  - Rest of care per primary team    Plan d/w Attending. Podiatry following.

## 2024-06-10 NOTE — PROGRESS NOTES
[facility] Family Medicine Residency Program  Affiliated with [facility] and Linus Ribera     Resident Progress Note in Brief      SUBJECTIVE  Patient is awake and alert. Pain is not controlled. Reports feeling unwell due to abdominal pain. Declines abdominal exam. No other complaints or concerns at the time.      OBJECTIVE  BP 97/84   Pulse 88   Temp 97.5 °F (36.4 °C) (Oral)   Resp 17   Ht 1.778 m (5' 10\")   Wt (!) 136.3 kg (300 lb 8 oz)   SpO2 99%   BMI 43.12 kg/m²     Intake/Output:   No intake or output data in the 24 hours ending 06/10/24 1803    Labs and data:   Personally reviewed  Recent Labs     06/10/24  1358   WBC 19.0*   HGB 12.7   HCT 38.9   *     Recent Labs     06/10/24  1358   *   K 4.5   CL 97   CO2 28   BUN 20     Recent Labs     06/10/24  1358   GLOB 3.7     Recent Labs     06/10/24  1358   INR 3.1*      Invalid input(s): \"PHI\", \"PCO2I\", \"PO2I\", \"FIO2I\"  No results for input(s): \"CPK\", \"CKMB\" in the last 72 hours.    Invalid input(s): \"TROIQ\", \"BNPP\"    Telemetry reviewed:  -sinus rhythm, rate 92 bpm, Qtc 495    Ventilation/respiratory support:  -nasal canula 2L O2    Circulation/hemodynamic support:  -Fluids: none  -Pressors: levophed at 5.6 mcg/min  -Stress-dose steroids: solucortef 50 mg q6h  -Hemodynamically stable on pressors    Physical Examination  General appearance: alert, and in no distress   Chest: clear to auscultation, no wheezes, rales or rhonchi, symmetric air entry  Heart: normal rate, regular rhythm, normal S1/S2, no murmurs, rubs, or gallops  Abdomen: +bs, patient declined abdominal palpation  Neurological: alert, oriented to person & place, normal speech, no focal findings   Extremities: peripheral pulses normal, no pedal edema, no clubbing or cyanosis      ASSESSMENT AND PLAN  Elizabeth Hopkins is a 68 y.o. female with PMH of ESRD on PD, hemorrhoids, CAD, VTE on Warfarin, ILD & recent admission one week ago for proctitis admitted for septic shock .     I find  them to be currently in a unstable  condition with a moderate risk of acute decompensation.    Septic Shock: MAPs improving on levophed.  Etiology include possible SBP given worsening abdominal pain with increasing ascites, hx of cirrhosis, PD and recent intraabdominal infection, vs airspace Dx vs multiple bed wounds. CT w/ bibasilar airspace disease, increased ascites, subQ edema. WBC 19, Lipase 1624. TSH nl. BNP 4807. Ammonia <10. Was started on Stress dose steroids, Levophed gtt in ED, vanc and zosyn.    - Monitor vitals per unit protocol  - Strict I&O  - O2 prn, wean as tolerated  - Intensivist consulted, appreciate recs              -Stress-dose steroids              -Continue IV pressor support with levophed gtt, target MAP >65   -Midodrine 15 mg tid  - Florinef 0.1 mg bid  - Continue IV Vancomycin, Zosyn (6/10 - )  - Repeat lactate & Repeat troponin pending  - Ascitic fluid studies pending: culture, gram stain, cell count w/ diff, albumin, LDH, total protein, glucose, amylase  - Follow Blood cultures  - Daily CBC, CMP, Mg  - Consult Nephrology for PD  - Wound care consulted  - SUP w/ IV Protonix 40mg daily while on stress-dose steroids  - Pain management: tylenol prn, IV dilaudid prn for severe pain  - NPO w/ sips with meds     Worsening abdominal pain in s/o gastroparesis: worsening abdominal pain. Could be multifactorial given chronic gastroparesis which was present on last admission, also possible SBP as above, pancreatitis given elevated lipase. On PO erythromycin  - Hold PO erythromycin  - Management for possible SBP as above  - Protonix as above  - NPO w/ sips with meds and pain management as above      Please see daily progress note for detailed assessment and plan.     Zara Fritz MD  Family Medicine Resident

## 2024-06-10 NOTE — PROGRESS NOTES
[facility] Pharmacy Dosing Services: 06/10/24  Consult for Warfarin Dosing by Pharmacy by Dr. Tarango  Consult provided for this 69 yo female for indication of Hx of DVT  Day of Therapy: resumed from home. (PTA: Warfarin home dose unclear- will verify with pt)  Dose to achieve an INR goal of 2-3    Hold Warfarin tonight. INR = 3.1     Significant drug interactions: None  Previous dose At home   PT/INR Lab Results   Component Value Date/Time    INR 3.1 06/10/2024 01:58 PM    INR 1.8 05/03/2023 02:34 PM    INREXT 4.1 05/20/2021 12:00 AM      Platelets Lab Results   Component Value Date/Time     06/10/2024 01:58 PM      H/H Lab Results   Component Value Date/Time    HGB 12.7 06/10/2024 01:58 PM        Pharmacy to follow daily and will provide subsequent Warfarin dosing based on clinical status.  NATHANAEL MCCARTY [facility])   Contact information [phone]

## 2024-06-11 PROBLEM — K85.90 ACUTE PANCREATITIS: Status: ACTIVE | Noted: 2024-01-01

## 2024-06-11 PROBLEM — J90 PLEURAL EFFUSION: Status: ACTIVE | Noted: 2024-01-01

## 2024-06-11 PROBLEM — R18.8 CIRRHOSIS OF LIVER WITH ASCITES (HCC): Status: ACTIVE | Noted: 2024-01-01

## 2024-06-11 PROBLEM — D69.6 THROMBOCYTOPENIA (HCC): Status: ACTIVE | Noted: 2024-01-01

## 2024-06-11 PROBLEM — K64.8 OTHER HEMORRHOIDS: Status: ACTIVE | Noted: 2024-01-01

## 2024-06-11 PROBLEM — R60.1 ANASARCA: Status: ACTIVE | Noted: 2024-01-01

## 2024-06-11 PROBLEM — K74.60 CIRRHOSIS OF LIVER WITH ASCITES (HCC): Status: ACTIVE | Noted: 2024-01-01

## 2024-06-11 PROBLEM — E87.20 LACTIC ACIDOSIS: Status: ACTIVE | Noted: 2024-01-01

## 2024-06-11 NOTE — PROGRESS NOTES
[id number] [id number]   Office [phone]  Fax [phone]          Subjective / Objective     Subjective  Overnight Events: SEMAJ  Patient seen and examined at bedside. Reports feeling a little better this AM. Still has some abdominal pain. Denies CP, SOB, N/V, dysuria.    Respiratory:     Vitals:    06/11/24 0830   BP: (!) 120/51   Pulse: 90   Resp: 18   Temp: 97.6 °F (36.4 °C)   SpO2: 100%     Physical Examination:   General: in acute distress. Alert. Cooperative.   Head: Normocephalic. Atraumatic.   Eyes:              Conjunctiva pink. Sclera white. PERRL.   Throat: Mucosa pink. Moist mucous membranes. No tonsillar exudates or erythema. Palate movement equal bilaterally.   Neck: Supple. Normal ROM. No stiffness.   Respiratory: CTAB, reduced air entry in lower zones bilaterally. No w/r/r/c.   Cardiovascular: RRR. Normal S1,S2. No m/r/g. Pulses 2+ throughout.   GI: + bowel sounds. Nontender. No rebound tenderness or guarding. Nondistended.   Extremities: +2 LE edema. Distal pulses intact.    Musculoskeletal: Full ROM in all extremities.    Skin: Warm, dry. No rashes.    Neuro: A&Ox4. CN II-XII grossly intact. Strength 4/5 in all extremities.        I/O:  06/10 0701 - 06/11 0700  In: 27.5 [I.V.:27.5]  Out: -   Inpatient Medications  @[email or web address]@  Current Facility-Administered Medications   Medication Dose Route Frequency    fluconazole (DIFLUCAN) tablet 100 mg  100 mg Oral Daily    methylPREDNISolone sodium succ (SOLU-MEDROL) 40 mg in sterile water 1 mL injection  40 mg IntraVENous Q4H    diphenhydrAMINE (BENADRYL) injection 50 mg  50 mg IntraVENous Once    diatrizoate meglumine-sodium (GASTROGRAFIN) 66-10 % solution 30 mL  30 mL Oral ONCE PRN    iopamidol (ISOVUE-370) 76 % injection 100 mL  100 mL IntraVENous ONCE PRN    hydrocortisone sodium succinate PF (SOLU-CORTEF) injection 50 mg  50 mg IntraVENous Q6H    norepinephrine (LEVOPHED) 16 mg in sodium chloride 0.9 % 250 mL infusion

## 2024-06-11 NOTE — CONSULTS
hospice ([facility] vs Hospital)  [] Rehab (Skilled vs IPR)  [] Facility Long Term Care  [] Home with Home Health  [x] Undetermined at this time      ADVANCE CARE PLANNING:   [x] [facility] Interdisciplinary Team has updated the ACP Navigator with Health Care Decision Maker and Patient Capacity      Primary Decision Maker: Sandeep ReynoldsUzair - [phone]  Confirm Advance Directive: None  Patient Would Like to Complete Advance Directive: No/refused    Current Code Status: DNR     .           Please refer to Palliative Medicine ACP notes or assessment and plan above for further details.    PALLIATIVE ASSESSMENT:      Palliative Performance Scale (PPS):  PPS: 20    ECOG:   ECOG Status : Completely disabled [4]    Modified ESAS:  Modified-Purdys Symptom Assessment Scale (ESAS)  Tiredness Score: 9  Drowsiness Score: 8  Depression Score: 4  Pain Score: 6  Anxiety Score: 1  Nausea Score: 1  Appetite Score: 8  Dyspnea Score: No shortness of breath  Wellbeing Score: 8    Clinical Pain Assessment (nonverbal scale for severity on nonverbal patients):   Clinical Pain Assessment  Severity: 6  Location: epigastric going down across stomach lining  Character: burns, aches, just hurts  Duration: short weeks  Factors: none shared  Frequency: constant             Vital Signs: Blood pressure (!) 129/46, pulse 95, temperature 97 °F (36.1 °C), temperature source Axillary, resp. rate 16, height 1.778 m (5' 10\"), weight 130.6 kg (287 lb 14.7 oz), SpO2 98 %.    PHYSICAL ASSESSMENT:   General: [] Oriented x3  [] Well appearing  [] Intubated  [x]Ill appearing  []Other:  Mental Status: [] Normal mental status exam  [x] Drowsy  [x] Confused  [] Alert and NAD []Other:  Cardiovascular: [] Regular rate/rhythm  [] Arrhythmia  [] Other:   [x] deferred  Chest: [x] Effort normal  []Lungs clear  [] Respiratory distress  []Tachypnea  [] Other:  Abdomen: [] Soft/non-tender  [] Normal appearance  [] Distended  [] Ascites  [x] Other: gross [x]

## 2024-06-11 NOTE — CONSULTS
Name: Elizabeth Hopkins MRN: [id number]   : 1955 Hospital: [facility]   Date: 2024        Impression Plan   Septic shock  Abdominal pain- gastroparesis vs. Peritonitis vs other intestinal   Lactic acidosis  Encephalopathy  Leukocytosis  Morbid obesity  Chronic respiratory failure               Continue levophed gtt with goal MAP of >65  PRN low dose dilaudid for pain  IV antibiotics  Limited in fluid admin due to being a HD pt  Analysis of acites fluid including cell count- Does not meet criteria for SBP and contaminated by RBC, however neutrophil count overall elevated compared to previous samples   Serial lactic acid  CT abdomen with IV contrast and PO contrast (pt allergic, being pre-medicated)  Follow up blood cultures- NGTD  Continue midodrine  Hydrocortisone IV  Currently on home O2 amount  Continue coumadin (Pt currently therapeutic)  Protonix       Pt is critically ill. Critical care time spent with pt exclusive of procedures was 36 minutes    Radiology  ( personally reviewed) CT abdomen reviewed: small bilateral pleural effusions and associated atelectasis.   CXR: small pleural effusions   ABG Invalid input(s): \"PHI\", \"PO2I\", \"PCO2I\"       Subjective     This patient has been seen and evaluated at the request of Dr. New for septic shock.  Patient is a 68 y.o. female with PMHx of chronic respiratory failure, ILD?, VTE. gastroparesis and peritoneal HD presenting with increasing abdominal pain and vomiting. Found to be hypotensive and placed on levophed. Fluid administration was judicial due to pt being a HD pt.  reports that they are meticulous with peritoneal catheter and that he has checked the fluid making sure it looks clear. The family feels that her abdominal pain is consistent with her gastroparesis pain, however it sounds like the pain has increased over the last couple of weeks. Pt was recently admitted to [facility] for supra therapeutic INR of 13.9.    magnesium 30 MG tablet Take 1 tablet by mouth 2 times daily    Provider, MD Fanny   nitroGLYCERIN (NITROSTAT) 0.4 MG SL tablet DISSOLVE ONE TABLET UNDER THE TONGUE EVERY 5 MINUTES AS NEEDED FOR CHEST PAIN.  DO NOT EXCEED A TOTAL OF 3 DOSES IN 15 MINUTES 23   Nadege Perez DO   CALCITRIOL PO Take 0.5 mg by mouth    Automatic Reconciliation, Ar   OXYGEN O2 2L NC     Automatic Reconciliation, Ar   albuterol sulfate HFA (PROVENTIL;VENTOLIN;PROAIR) 108 (90 Base) MCG/ACT inhaler Inhale 2 puffs into the lungs every 4 hours as needed 19   Automatic Reconciliation, Ar   fluocinolone 0.01 % cream Apply topically 2 times daily as needed 11/15/22   Automatic Reconciliation, Ar   pantoprazole (PROTONIX) 40 MG tablet Take 1 tablet by mouth 2 times daily (before meals) 22   Automatic Reconciliation, Ar     [unfilled]  Allergies   Allergen Reactions    Iodine Anaphylaxis     Tolerates when pre medicated w/ IV solumedrol and benadryl prior to procedure     Shellfish Allergy Anaphylaxis    Levofloxacin Nausea And Vomiting    Morphine Hives and Itching    Reglan [Metoclopramide] Itching     Vomiting, TKD    Nsaids Nausea And Vomiting      Social History     Tobacco Use    Smoking status: Former     Current packs/day: 0.00     Average packs/day: 0.5 packs/day for 44.0 years (22.0 ttl pk-yrs)     Types: Cigarettes     Start date: 1970     Quit date: 2014     Years since quittin.5    Smokeless tobacco: Never   Substance Use Topics    Alcohol use: No      Family History   Problem Relation Age of Onset    Anesth Problems Neg Hx     Heart Disease Mother     No Known Problems Daughter     No Known Problems Son     No Known Problems Son           Laboratory: I have personally reviewed the critical care flowsheet and labs.     Recent Labs     06/10/24  1358 24  0352   WBC 19.0* 25.0*   HGB 12.7 11.3*   HCT 38.9 33.4*   * 81*       Recent Labs     06/10/24  1358 24  0352   * 132*

## 2024-06-11 NOTE — PROGRESS NOTES
Occupational Therapy: defer    Chart reviewed, consulted with RN, attempted OT evaluation.  Patient was received quite drowsy (had received benadryl recently prior to CT) and adamantly refused to participate due to abdominal pain.  RN notified.  Will defer OT at this time and will follow-up as able and appropriate.    Aston Shah, OTR/L

## 2024-06-11 NOTE — PROGRESS NOTES
attended Interdisciplinary Team (IDT) rounds in ICU with medical/care team members where this patient's ongoing care was discussed.  Outcome:  updated on Pt., and Interdisciplinary Team aware of  availability for patient, family and staff.  Thank you.    Chaplain Uzair Alvarado M.Div., BCC.   [phone])

## 2024-06-11 NOTE — PROGRESS NOTES
[facility] Pharmacy Dosing Services: 06/11/24  Consult for Warfarin Dosing by Pharmacy by Dr. Tarango  Consult provided for this 67 yo female for indication of Hx of DVT  Day of Therapy: resumed from home. (PTA: Warfarin home dose unclear- will verify with pt)  Dose to achieve an INR goal of 2-3    Hold Warfarin tonight. INR = 3. Last dose 0.5 mg on 6/1/24. Vitamin K PO 5 mg given 5/30/24.    Significant drug interactions: None  Previous dose At home   PT/INR Lab Results   Component Value Date/Time    INR 3.0 06/11/2024 03:52 AM    INR 1.8 05/03/2023 02:34 PM    INREXT 4.1 05/20/2021 12:00 AM      Platelets Lab Results   Component Value Date/Time    PLT 81 06/11/2024 03:52 AM      H/H Lab Results   Component Value Date/Time    HGB 11.3 06/11/2024 03:52 AM        Thanks,   David Jean, SaimaD

## 2024-06-11 NOTE — PROGRESS NOTES
Physical Therapy    Consult received, chart reviewed.  Spoke with RN who cleared patient to be seen though noted may be groggy from Benadryl pre-CT scan.  Patient received sleeping but roused, initially groggy with responses to questions.  When asked about willingness to participate with therapy for mobility patient very clearly and non-groggily stated, \"Hell no\". Of note, patient here just over 1 week ago, very resistant to mobility in the acute setting and was MAX/TOTAL A x 2 for bed mobility only.  Will follow up tomorrow as appropriate.    Eugenia Maciel, MS, PT

## 2024-06-11 NOTE — CARE COORDINATION
06/11/24 1511   Service Assessment   Patient Orientation Alert and Oriented   Cognition Alert   History Provided By Patient   Primary Caregiver Spouse   Support Systems Spouse/Significant Other   Patient's Healthcare Decision Maker is: Legal Next of Kin  (-Uzair Hopkins)   Last Visit to PCP Within last 3 months   Prior Functional Level Mobility;Bathing;Cooking;Housework;Dressing;Toileting;Assistance with the following:;Shopping   Current Functional Level Assistance with the following:;Bathing;Dressing;Toileting;Cooking;Housework;Shopping;Mobility   Can patient return to prior living arrangement Yes   Ability to make needs known: Fair   Family able to assist with home care needs: Yes   Would you like for me to discuss the discharge plan with any other family members/significant others, and if so, who? Yes  ()   Financial Resources Medicare   Community Resources Other (Comment)  ([facility])   CM/SW Referral Disease Management Education   Social/Functional History   Lives With Spouse;Son   Type of Home Apartment   Home Layout One level   Home Access Level entry   Bathroom Equipment Grab bars in shower;Shower chair   Home Equipment Walker - Rolling;Cane;Electric scooter   Receives Help From Family   ADL Assistance Needs assistance   Homemaking Assistance Needs assistance   Ambulation Assistance Needs assistance   Transfer Assistance Independent   Active  No   Occupation Unemployed   Discharge Planning   Type of Residence Apartment   Living Arrangements Spouse/Significant Other   Current Services Prior To Admission C-pap;Durable Medical Equipment;Oxygen Therapy;Home Care   Current DME Prior to Arrival Cpap;Walker;Other (Comment)  (electric scooter)   Potential Assistance Needed Durable Medical Equipment;Other (Comment)  (home with hospice)   Potential DME Needed Other (Comment)  (to be deternined)   DME Ordered? No   Potential Assistance Purchasing Medications No   Type of Home Care  Services OT;PT;Nursing Services  ([facility])   Patient expects to be discharged to: Apartment     Pt is a readmission.  Elke Flores with [facility] called me to update me that [facility] feels pt is more and more appropriate for home hospice.  [facility] is hoping that Palliative Medicine can approach this with pt and family while pt is hospitalized.  I have contacted attending requesting Palliative Medicine consult.  I briefly discussed pt with Palliative SW.    Tegan Solorzano RN

## 2024-06-11 NOTE — FLOWSHEET NOTE
PD DISCONNECTION    Primary RN SBAR: JO Wang, GAGAN  Patient Education provided: procedural / infection control  Incapacitated Nurse [facility]. provided: Yes  Preferred Education method and Primary language: English / Verbal  Hospital associated wait time; reason: none  Hepatitis B Surface Ag   Date/Time Value Ref Range Status   06/01/2024 10:54 AM <0.10 Index Final     Hep B S Ab   Date/Time Value Ref Range Status   06/01/2024 10:54 AM <3.10 mIU/mL Final        06/11/24 1045   Vitals   BP (!) 92/38   Pulse 81   Respirations 16   Observations & Evaluations   Level of Consciousness 0   Heart Rhythm   (ICU monitoring)   Respiratory Quality/Effort Unlabored   O2 Device Nasal cannula   Skin Color   (appropriate)   Skin Condition/Temp Warm;Dry   Edema Generalized   Peritoneal Dialysis Catheter Right lower abdomen   No placement date or time found.   Present on Admission/Arrival: Yes  Catheter Location: Right lower abdomen   Status Deaccessed   Site Condition Clean, dry, intact   Dressing Status Clean, dry & intact   Dressing Gauze   Date of Last Dressing Change 06/10/24   Post-Treatment (Cycler)   Average Dwell Time (Hours:Minutes) 1:08   Lost Dwell Time (Hours:Minutes) 0:09   PD Output (mL) 268 mL  (ID 2572 +  - LF 2500 = 268)

## 2024-06-11 NOTE — FLOWSHEET NOTE
06/10/24 2330   Vitals   BP (!) 108/92   Temp 97.6 °F (36.4 °C)   Temp Source Axillary   Pulse 92   Respirations 18   SpO2 97 %   Observations & Evaluations   Level of Consciousness 0   Heart Rhythm Regular   Respiratory Quality/Effort Dyspnea with exertion   FiO2  36 %   O2 Device PAP (positive airway pressure)   Skin Color Other (comment)  (Normal for ethnicity)   Skin Condition/Temp Warm;Dry   Abdomen Inspection Obese   Edema Generalized   Edema Generalized +2   RLE Edema +2   LLE Edema +2   Peritoneal Dialysis Catheter Right lower abdomen   No placement date or time found.   Present on Admission/Arrival: Yes  Catheter Location: Right lower abdomen   Status Accessed   Site Condition Clean, dry, intact   Dressing Status New dressing applied  (Exit site cleansed with Exsept.  Gentamicin 1% ointment applied and dressed with dry/sterile gauze.)   Dressing Gauze   Date of Last Dressing Change 06/10/24   Dialysis Type Continuous cycling   Exit Site Condition excellent   Catheter Care Given Yes  (Two minute Alcavis scrub/soak performed prior to connection.)   Cycler   Verification of Prescription CCPD   Informed Consent  Yes   Total Volume Programmed 45809 mL   Therapy Time (Hours:Minutes) 9:00   Cycler Type Yen [facility]   Fill Volume 3000 mL   Last Fill Volume 0 mL   Dextrose Setting Same (Nonextraneal)   I Drain Alarm 0 mL   Number of Cycles 5   Bag Volume 6000 mL   Number of Bags Used 3   Dianeal Solution Other (Comment)  (1.5% Dextrose in 6000 ml)        06/10/24 2330   Technical Checks   ICEBOAT I;C;E;B;O;A;T     Primary RN SBAR: Carmina Mcneal RN  Patient Education provided: Exit site care  Incapacitated Nurse [facility]. provided: Carmina Mcneal RN  Preferred Education method and Primary language: Explanation/English  Hospital associated wait time; reason: N/A    Comments:  New cassette primed and tested.  After catheter disinfection, sterile connection made and therapy initiated.  \"Check patient line\" alarm

## 2024-06-11 NOTE — CONSULTS
NEPHROLOGY CONSULT NOTE     Patient: Elizabeth Hopkins MRN: [id number]  PCP: Nadege Perez DO   :     1955  Age:   68 y.o.  Sex:  female      Referring physician: Gerson Dent MD  Reason for consultation: 68 y.o. female with Lactic acidosis [E87.20]  Pleural effusion [J90]  HAP (hospital-acquired pneumonia) [J18.9, Y95]  Septic shock (HCC) [A41.9, R65.21]  Hypotension, unspecified hypotension type [I95.9]  Pneumonia of both lungs due to infectious organism, unspecified part of lung [J18.9]  Acute pancreatitis, unspecified complication status, unspecified pancreatitis type [K85.90] complicated by ESRD on PD  Chief Complaint: Abdominal pain  Admission Date: 6/10/2024  1:05 PM  LOS: 1 day      ASSESSMENT and PLAN :   ESRD on PD  -CCPD night at home, Dr Parker  -Last treatment last night without complications.  -Endorses persistent abdominal pain without fever or cloudy effluent. No prior episodes of peritonitis  -Will check cell count/fluid culture  -Home rx: 5x3L, 9 hours, no last fill. Using 2.5% bags  -Continue home treatment using 1.5% given hypotension  -[place] on call notified  -Diflucan daily while on abx for fungal peritonitis prophylaxis  -Gent cream to exit site    Hypotension, SIRS  -Empiric abx coverage  -Blood urine cultures pending  -Further evaluation in progress    Abdominal pain  -Hx gastroparesis  -PD fluid ordered    HFpEF  VTE  CAD  Cirrhosis  VERÓNICA    Care Plan discussed with:  Patient, family, nurse        Thank you for consulting [facility] in the care of your patient.      Subjective:   HPI: Elizabeth Hopkins is a 68 y.o.  female with past medical history of ESRD on PD, CAD, gastroparesis, HFpEF, cirrhosis, GERD, obesity who has been admitted to the hospital for shock, abdominal pain.  Patient presented to the ED earlier today with complaint of abdominal pain. States symptoms have been persistent since last hospitalization a week ago,

## 2024-06-11 NOTE — PROGRESS NOTES
Spiritual Care Assessment/Progress Note  [facility]    Name: Elizabeth Hopkins MRN: [id number]    Age: 68 y.o.     Sex: female   Language: English     Date: 6/11/2024            Total Time Calculated: 14 min              Spiritual Assessment begun in [facility] A4 INTENSIVE CARE UNIT  Service Provided For: Patient     Encounter Overview/Reason: Initial Encounter    Spiritual beliefs:      [x] Involved in a clint tradition/spiritual practice:      [x] Supported by a clint community:      [] Claims no spiritual orientation:      [] Seeking spiritual identity:           [] Adheres to an individual form of spirituality:      [] Not able to assess:                Identified resources for coping and support system:   Support System: Spouse       [x] Prayer                  [] Devotional reading               [] Music                  [] Guided Imagery     [] Pet visits                                        [] Other: (COMMENT)     Specific area/focus of visit   Encounter:    Crisis:    Spiritual/Emotional needs: Type: Spiritual Support, Emotional Distress  Ritual, Rites and Sacraments:    Grief, Loss, and Adjustments:    Ethics/Mediation:    Behavioral Health:    Palliative Care:    Advance Care Planning:           Narrative:   Spiritual care assessment with Mrs. Elizabeth Hopkins in ICU.  Reviewed chart and seen Pt earlier this month. Met Pt bedside, she is reclined up in bed but asleep.  was able to wake her because dialysis is about to come in and wake her for her treatment.  reminder her of last visit, confirmed that no significant changes since last visit and encouraged her and she asked for prayer again.  prayed for her and she expressed appreciation as dialysis is coming in now.  let her know we are here for her and keeping eye and ear out for her, she expressed thankfulness. Please contact Spiritual Care for any emotional and spiritual needs and/or referrals. Thank  you.    Chaplain Uzair Alvarado M.Div., [facility].  [facility] Team [phone]

## 2024-06-11 NOTE — CARE COORDINATION
06/11/24 1533   Readmission Assessment   Number of Days since last admission? 1-7 days   Previous Disposition Home with Home Health   Who is being Interviewed Patient;Caregiver   What was the patient's/caregiver's perception as to why they think they needed to return back to the hospital?   (readmitted with sepsis)   Did you visit your Primary Care Physician after you left the hospital, before you returned this time? No   Why weren't you able to visit your PCP? (S)  Other (Comment)  (readmitted before could see PCP)   Did you see a specialist, such as Cardiac, Pulmonary, Orthopedic Physician, etc. after you left the hospital? No   Who advised the patient to return to the hospital? Home Health Staff   Does the patient report anything that got in the way of taking their medications? No   In our efforts to provide the best possible care to you and others like you, can you think of anything that we could have done to help you after you left the hospital the first time, so that you might not have needed to return so soon? Arrange for more help when leaving the hospital;Teaching during hospitalization regarding your illness;Teach back instructions regarding management of illness;Other (Comment)  (may benefit from home hospice if pt agrees)     Attending contacted and attending will place Palliative consult.    Tegan Solorzano RN

## 2024-06-11 NOTE — PROGRESS NOTES
[facility]    Elizabeth Hopkins  YOB: 1955          Assessment & Plan:     ESRD on CCPD (Keith Villaseñor)  Abd pain  Septic shock  Lactic acidosis  Meets criteria for peritonitis but atypical in presentation and likely does not explain the entirety of her condition (shock, lactic acidosis are not typical of PD related peritonitis)  HFpEF  CAD  VERÓNICA  Cirrhosis    Rec:  Continue zosyn, vanc, fluconazole  Follow up PD fluid cultures  Repeat PD fluid cell count  Agree with Abd CT for further evaluation of symptoms  D/w Dr. De León       Subjective:   CC: ESRD  HPI: PD went well. Still has abd pain but maybe better. Lacitc acidosis is worse. PD fluid  with 64% polys.    Current Facility-Administered Medications   Medication Dose Route Frequency    fluconazole (DIFLUCAN) tablet 100 mg  100 mg Oral Daily    methylPREDNISolone sodium succ (SOLU-MEDROL) 40 mg in sterile water 1 mL injection  40 mg IntraVENous Q4H    diphenhydrAMINE (BENADRYL) injection 50 mg  50 mg IntraVENous Once    diatrizoate meglumine-sodium (GASTROGRAFIN) 66-10 % solution 30 mL  30 mL Oral ONCE PRN    iopamidol (ISOVUE-370) 76 % injection 100 mL  100 mL IntraVENous ONCE PRN    hydrocortisone sodium succinate PF (SOLU-CORTEF) injection 50 mg  50 mg IntraVENous Q6H    norepinephrine (LEVOPHED) 16 mg in sodium chloride 0.9 % 250 mL infusion  1-100 mcg/min IntraVENous Continuous    sodium chloride flush 0.9 % injection 5-40 mL  5-40 mL IntraVENous 2 times per day    sodium chloride flush 0.9 % injection 5-40 mL  5-40 mL IntraVENous PRN    0.9 % sodium chloride infusion   IntraVENous PRN    ondansetron (ZOFRAN-ODT) disintegrating tablet 4 mg  4 mg Oral Q8H PRN    Or    ondansetron (ZOFRAN) injection 4 mg  4 mg IntraVENous Q6H PRN    polyethylene glycol (GLYCOLAX) packet 17 g  17 g Oral Daily PRN    acetaminophen (TYLENOL) tablet 650 mg  650 mg Oral Q6H PRN    Or    acetaminophen

## 2024-06-11 NOTE — PROGRESS NOTES
Pharmacy Dosing Services: 6/11/2024    The pharmacist has determined that this patient meets P & T approved criteria for conversion from IV to oral therapy for the following medication: Pantoprazole      The pharmacist has written the following order for the patient: Protonix 40 PO QD    The pharmacist will continue to monitor the patient's status and advise the physician if conversion back to IV therapy is recommended.    Thanks,   David Jean, SaimaD

## 2024-06-11 NOTE — PROGRESS NOTES
1000 - Notified Dr. De León of critical lactic 7.9 - next lactic acid lab due at 12pm    1256 - Notified Dr. De León of critical lactic 7.0 - next lactic acid lab due at 1600    1638 - Called GI consult. Obtained lipase lab.     1647 - Notified Dr. De León of critical lactic 6.1 - next lactic acid lab due at 2000.

## 2024-06-11 NOTE — PROGRESS NOTES
[facility] Dosing Services: Antimicrobial Stewardship Daily Doc  Consult for antibiotic dosing of Vancomycin by Dr. Tarango  Indication: SBP, HAP  Day of Therapy: 2/14  ESRD on PD    Ht Readings from Last 1 Encounters:   06/11/24 1.778 m (5' 10\")        Wt Readings from Last 1 Encounters:   06/10/24 (!) 136.3 kg (300 lb 8 oz)      Vancomycin therapy:  Loading dose: Vancomycin 2500 mg x1 dose now/given  Maintenance dose: Vancomycin dosing per levels due to PD   Dose calculated to approximate a           b. Random Level 15-20 mcg/mL   Last level: in 3 days   Plan: Continue same  Dose administration notes: Doses given appropriately based on levels.   Check levels 6/13 AM and dose adjust accordingly     Non-Kinetic Antimicrobial Dosing Regimen:   Current Regimen:  Zosyn 3.375 mg IV q12hr  Recommendation: continue same  Dose administration notes: Doses given appropriately as scheduled    Other Antimicrobial   (not dosed by pharmacist) Fluconazole 100 mg PO QD     Cultures Blood cultures (x2): Pending  Culture: Pending   Serum Creatinine Lab Results   Component Value Date/Time    CREATININE 5.44 06/11/2024 03:52 AM          Creatinine Clearance On PD     Temp Temp: 97.8 °F (36.6 °C) (Oral)       WBC Lab Results   Component Value Date/Time    WBC 25.0 06/11/2024 03:52 AM          Procalcitonin Lab Results   Component Value Date/Time    PROCAL 0.55 05/30/2024 05:09 PM      For Antifungals, Metronidazole and Nafcillin: Lab Results   Component Value Date/Time    ALT 12 06/11/2024 03:52 AM    AST 19 06/11/2024 03:52 AM        Pharmacist  Saima SalasD '25  Co-Signed: David Jean PharmD  [phone]

## 2024-06-11 NOTE — PROGRESS NOTES
ondansetron, polyethylene glycol, acetaminophen **OR** acetaminophen, HYDROmorphone      Current Nutrition Intake & Therapies:    Average Meal Intake: 1-25%  Average Supplements Intake: None Ordered  ADULT DIET; Full Liquid; 4 carb choices (60 gm/meal)    Anthropometric Measures:  Height: 177.8 cm (5' 10\")  Ideal Body Weight (IBW): 150 lbs (68 kg)       Current Body Weight: 136.3 kg (300 lb 7.8 oz), 200.3 % IBW. Weight Source: Bed Scale  Current BMI (kg/m2): 43.1  Usual Body Weight: 117.9 kg (260 lb)  % Weight Change (Calculated): 15.6  Weight Adjustment For: No Adjustment                 BMI Categories: Obese Class 3 (BMI 40.0 or greater)    Estimated Daily Nutrient Needs:  Energy Requirements Based On: Kcal/kg  Weight Used for Energy Requirements: Ideal  Energy (kcal/day): 2045 (30 kcal/kg IBW - 300 from PD)  Weight Used for Protein Requirements: Ideal  Protein (g/day): 136 (2.0 g/kg IBW)  Method Used for Fluid Requirements: 1 ml/kcal  Fluid (ml/day): 2045    Nutrition Diagnosis:   Increased nutrient needs related to renal dysfunction as evidenced by dialysis  Moderate malnutrition related to inadequate protein-energy intake, pain, altered GI function as evidenced by nausea, vomiting, Criteria as identified in malnutrition assessment, localized or generalized fluid accumulation    Nutrition Interventions:   Food and/or Nutrient Delivery: Continue Current Diet (Consider DHT placement)  Nutrition Education/Counseling: Survival skills/brief education completed  Coordination of Nutrition Care: Continue to monitor while inpatient  Plan of Care discussed with: patient, nursing    Goals:     Goals: PO intake 50% or greater, by next RD assessment       Nutrition Monitoring and Evaluation:   Behavioral-Environmental Outcomes: None Identified  Food/Nutrient Intake Outcomes: Diet Advancement/Tolerance, Food and Nutrient Intake  Physical Signs/Symptoms Outcomes: Biochemical Data, Weight, GI Status, Nausea or Vomiting, Fluid  Status or Edema    Discharge Planning:    Too soon to determine     Tatum Young MS, RD, [facility]  Ext: [phone], or via PerfectServe

## 2024-06-12 NOTE — PROGRESS NOTES
attended Interdisciplinary Team (IDT) rounds in ICU with medical/care team members where this patient's ongoing care was discussed.  Outcome:  updated on Pt., and Interdisciplinary Team aware of  availability for patient, family and staff.  Thank you.    Chaplain Uzair Alvarado M.Div., [facility].   [phone])

## 2024-06-12 NOTE — PROGRESS NOTES
Palliative Medicine      Code Status: DNR    Advance Care Planning:    Primary Decision Maker: Sandeep ,Uzair - Spouse - [phone]    Pt does not have AMD on file.  In absence of verified Medical POA,  Uzair Hopkins is legal NOK and surrogate decision maker if pt is unable to speak for herself.    Patient / Family Encounter Documentation    Participants (names): Pt, dtr Roosevelt, Palliative Medicine (NP Bhavna, LCSW Andria)    Narrative:  Met with pt and dtr in ICU.  Pt was awake but fatigued, was in too much discomfort to engage in lengthy conversation.  Per review of chart, pt and  have been  49+ yrs, have 3 adult children and 5 grandchildren.  Pt lives with  and a son, dtr Roosevelt lives only minutes away.  Pt has dx of ESRD, was previously on HD but reports being on PD for the past 2-3 yrs.  Dtr is a dialysis tech and is knowledgeable about pt's medical condition, spoke of pt's significant decline over the past months, reports increased weakness, poor intake over the past several weeks, stated, \"I just want my mom back.\"     Psychosocial Issues Identified/ Resilience Factors:  Pt/family coping with significant medical issues and debility, pt is currently experiencing a great deal of physical pain as well.  No spiritual concerns identified; Chaplains are following for support, note pt appreciates prayer.     Caregiver [address]: Dtr expressed concern re: family's ability to care for pt at home in current condition.  Pt had made progress with home health in the recent past but dtr indicated pt is much weaker now.  Does the caregiver feel confident administering medication? No concerns identified  Does the caregiver need any help connecting with community resources? Not at this time  Does the caregiver feel confident assisting with activities of daily living? Might need assistance as pt's care needs increase    Goals of Care / Plan: Dtr spoke with Nephrology just prior to Palliative  visit, reports plan to hold PD and start HD.  GI is planning potential ECG on Friday 6/14.  Palliative team will follow for ongoing support, symptom management, and goc conversations.     Thank you for including Palliative Medicine in the care of Ms. Hopkins.     Andria Ambrose, PARUL, [facility]  [phone])

## 2024-06-12 NOTE — PROGRESS NOTES
Name: Elizabeth Hopkins MRN: [id number]   : 1955 Hospital: [facility]   Date: 2024        Impression Plan   Septic shock  Abdominal pain- gastroparesis vs. Peritonitis vs other intestinal   Lactic acidosis  Encephalopathy  Leukocytosis  Morbid obesity  Chronic respiratory failure               Continue levophed gtt with goal MAP of >65  PRN low dose dilaudid for pain  IV antibiotics  Limited in fluid admin due to being a HD pt  Analysis of acites fluid including cell count- Does not meet criteria for SBP and contaminated by RBC, however neutrophil count overall elevated compared to previous samples so will continue to treat with antibiotics  Serial lactic acid- fluctuates up and down   CT abdomen with IV contrast concerning for ulcer vs mass. Lipase decreasing despite increasing pain, so so source of pain less likely pancreatitis  GI consulted- possible EGD in the next couple of days  Increase PPI to bid  Continue midodrine  Hydrocortisone IV  Currently on home O2 amount  Continue coumadin (Pt currently therapeutic)  Protonix       Radiology  ( personally reviewed) CT abdomen reviewed: small bilateral pleural effusions and associated atelectasis.   CXR: small pleural effusions   ABG Invalid input(s): \"PHI\", \"PO2I\", \"PCO2I\"       Subjective     This patient has been seen and evaluated at the request of Dr. New for septic shock.  Patient is a 68 y.o. female with PMHx of chronic respiratory failure, ILD?, VTE. gastroparesis and peritoneal HD presenting with increasing abdominal pain and vomiting. Found to be hypotensive and placed on levophed. Fluid administration was judicial due to pt being a HD pt.  reports that they are meticulous with peritoneal catheter and that he has checked the fluid making sure it looks clear. The family feels that her abdominal pain is consistent with her gastroparesis pain, however it sounds like the pain has increased over the last couple of  Disease Mother     No Known Problems Daughter     No Known Problems Son     No Known Problems Son           Laboratory: I have personally reviewed the critical care flowsheet and labs.     Recent Labs     06/10/24  1358 06/11/24  0352 06/12/24  0441   WBC 19.0* 25.0* 23.9*   HGB 12.7 11.3* 10.9*   HCT 38.9 33.4* 32.3*   * 81* 81*       Recent Labs     06/10/24  1358 06/11/24  0352 06/12/24  0441   * 132* 131*   K 4.5 4.7 4.3   CL 97 97 95*   CO2 28 24 26   BUN 20 21* 26*   PHOS  --   --  4.0   ALT 14 12 13   INR 3.1* 3.0* 2.9*         Objective:     Mode Rate Tidal Volume Pressure FiO2 PEEP                    Vital Signs:     TMAX(24)      Intake/Output:   Last shift:         Last 3 shifts:   No intake/output data recorded.[id number]    Intake/Output Summary (Last 24 hours) at 6/12/2024 0854  Last data filed at 6/11/2024 1200  Gross per 24 hour   Intake 240 ml   Output 268 ml   Net -28 ml       EXAM:   GENERAL: awake, alert, but in pain HEENT:  PERRL, EOMI, no alar flaring or epistaxis, oral mucosa moist without cyanosis, NECK:  no jugular vein distention, no retractions, no thyromegaly or masses, LUNGS: CTA, no w/r/r HEART:  Regular rate and rhythm with no MGR; no edema is present, ABDOMEN:  soft with tenderness in the epigastrium, bowel sounds difficult to hear EXTREMITIES:  warm with no cyanosis, SKIN:  no jaundice or ecchymosis, and NEUROLOGIC:  alert and oriented, grossly non-focal, mildly confused     Tiny De León MD

## 2024-06-12 NOTE — PROGRESS NOTES
dextrose bolus 10% 250 mL  250 mL IntraVENous PRN    glucagon injection 1 mg  1 mg SubCUTAneous PRN    dextrose 10 % infusion   IntraVENous Continuous PRN    insulin lispro (HUMALOG,ADMELOG) injection vial 0-8 Units  0-8 Units SubCUTAneous Q6H    sodium chloride flush 0.9 % injection 5-40 mL  5-40 mL IntraVENous 2 times per day    sodium chloride flush 0.9 % injection 5-40 mL  5-40 mL IntraVENous PRN    0.9 % sodium chloride infusion   IntraVENous PRN    ondansetron (ZOFRAN-ODT) disintegrating tablet 4 mg  4 mg Oral Q8H PRN    Or    ondansetron (ZOFRAN) injection 4 mg  4 mg IntraVENous Q6H PRN    polyethylene glycol (GLYCOLAX) packet 17 g  17 g Oral Daily PRN    acetaminophen (TYLENOL) tablet 650 mg  650 mg Oral Q6H PRN    Or    acetaminophen (TYLENOL) suppository 650 mg  650 mg Rectal Q6H PRN    piperacillin-tazobactam (ZOSYN) 3,375 mg in sodium chloride 0.9 % 50 mL extended IVPB (mini-bag)  3,375 mg IntraVENous Q12H    fludrocortisone (FLORINEF) tablet 0.1 mg  0.1 mg Oral BID    midodrine (PROAMATINE) tablet 15 mg  15 mg Oral TID    Vancomycin dosing per PD   Other RX Placeholder    Warfarin pharmacy to dose daily   Other RX Placeholder    dianeal lo-aura 1.5% 6,000 mL  6,000 mL IntraPERitoneal QPM    dianeal lo-aura 1.5% 6,000 mL  6,000 mL IntraPERitoneal QPM    dianeal lo-aura 1.5% 6,000 mL  6,000 mL IntraPERitoneal QPM    gentamicin (GARAMYCIN) 0.1 % cream   Topical Daily          Objective:     Vitals:  Blood pressure (!) 129/46, pulse 95, temperature 97 °F (36.1 °C), temperature source Axillary, resp. rate 16, height 1.778 m (5' 10\"), weight 130.6 kg (287 lb 14.7 oz), SpO2 98 %.  Temp (24hrs), Av.2 °F (36.2 °C), Min:96.7 °F (35.9 °C), Max:97.8 °F (36.6 °C)      Intake and Output:   0701 -  1900  In: 101.9 [I.V.:4.5]  Out: -   06/10 190 -  0700  In: 416.8 [P.O.:240; I.V.:27.5]  Out: 268     Physical Exam:               GENERAL ASSESSMENT: NAD  HEENT: Nontraumatic   CHEST: CTA  HEART:  S1S2  ABDOMEN: soft +epigastric tenderness  EXTREMITY: no EDEMA  NEURO: Grossly non focal          ECG/rhythm:    Data Review        Recent Labs     06/10/24  1358 06/11/24  0352 06/12/24  0441   * 132* 131*   K 4.5 4.7 4.3   CL 97 97 95*   CO2 28 24 26   BUN 20 21* 26*   CREATININE 5.39* 5.44* 5.32*   GLUCOSE 180* 201* 207*   CALCIUM 9.5 9.3 8.8           : Teresa Fuentes MD  6/12/2024        [facility]:  [email or web address]  [email or web address]    [place] office:  [address], [address]  [place] [address]  Phone: [phone]  Fax :     [phone]    [place] office:  [address] [place] [address]  Phone - [phone]  Fax - [phone]

## 2024-06-12 NOTE — PROGRESS NOTES
Shellfish Allergy Anaphylaxis    Levofloxacin Nausea And Vomiting    Morphine Hives and Itching    Reglan [Metoclopramide] Itching     Vomiting, TKD    Nsaids Nausea And Vomiting     CBC:  Recent Labs     06/10/24  1358 06/11/24  0352 06/12/24  0441   WBC 19.0* 25.0* 23.9*   HGB 12.7 11.3* 10.9*   * 81* 81*       Metabolic Panel:  Recent Labs     06/10/24  1358 06/11/24  0352 06/12/24  0441   * 132* 131*   K 4.5 4.7 4.3   CL 97 97 95*   CO2 28 24 26   BUN 20 21* 26*   PHOS  --   --  4.0   ALT 14 12 13   INR 3.1* 3.0* 2.9*       Imaging/procedures:   CT Result (most recent):  CTA ABD PELVIS W WO CONTRAST 06/11/2024    Narrative  CLINICAL HISTORY: Evaluate for ischemic bowel. Abdominal pain, nausea, vomiting    COMPARISON: CT abdomen pelvis Coby 10, 2024    TECHNIQUE: CT of the abdomen and pelvis with and without IV contrast , 100 cc  Isovue-370 is performed. Axial images from the abdomen to the level of the upper  thighs is obtained. Manual post-processing of the images and coronal  reformatting is also performed. Standard dose modulation was utilized to reduce  the overall radiation dose administered to the patient.  Multiplanar reformatted  imaging was performed.  Sagittal and coronal reformatting.  CT dose reduction  was achieved through use of a standardized protocol tailored for this  examination and automatic exposure control for dose modulation.    MIP reconstructed images were obtained in the coronal and sagittal plane.    Oral contrast was administered to improve visualization of the bowel lumen.    FINDINGS:  Vasculature:  Aorta: Moderate atherosclerotic disease without aneurysm or dissection.    Visceral arteries: The celiac artery is widely patent. Moderate disease at the  ostium of the superior mesenteric artery without stenosis or occlusive disease.  The inferior mesenteric artery is patent    Renal arteries: Ostial stenosis of the right renal artery. Moderate disease at  the ostium of

## 2024-06-12 NOTE — PROGRESS NOTES
Occupational Therapy: defer    Chart reviewed, consulted with RN, attempted OT evaluation.  Patient received asleep. Patient's daughter at bedside reported patient has had severe abdominal pain today with difficulty resting.  Daughter requested to hold off on activity today and check back tomorrow.  Note patient was recently seen by PT/OT on 6/3/24 last admission, when she required max-totalAx2 for sit- [facility] with recommendation for SNF.  Per her daughter, since patient d/c home on 6/4, she has stayed in a recliner chair and has been non-ambulatory with heavy assistance to stand.  Will follow-up as able and appropriate for OT.    Aston Shah, OTR/L

## 2024-06-12 NOTE — PROGRESS NOTES
Resident Progress Note in Brief    S: Patient seen and examined at bedside. Resting. NAD. No new concerns per family at this time.       O:  BP (!) 99/54   Pulse 92   Temp 97 °F (36.1 °C) (Axillary)   Resp 16   Ht 1.778 m (5' 10\")   Wt 130.6 kg (287 lb 14.7 oz)   SpO2 98%   BMI 41.31 kg/m²     Physical Examination:   General appearance - sleeping. NAD.  Chest - clear to auscultation, no wheezes, rales or rhonchi, symmetric air entry  Heart - normal rate, regular rhythm, normal S1, S2, no murmurs, rubs, clicks or gallops,   Abdomen - soft, nontender, nondistended, no masses or organomegaly  Neurological - alert, oriented, normal speech, no focal findings  Extremities - peripheral pulses normal, no pedal edema, no clubbing or cyanosis    A/P:   Septic Shock (resolved): Now off levophed gtt. POA WBC of 19, LA 4.61. Possible SBP, also possible airspace Dx and multiple bed wounds. CT w/ bibasilar airspace disease, increased ascites, subQ edema. Lipase 1624.  Ascitic fluid cell count does not meet criteria for SBP, however increased neutrophils.   - Continue IV Vancomycin, Zosyn (6/10-)  - PO Diflucan 100 mg daily    - Strict I&O  - O2 prn, wean as tolerated  - Intensivist following   - Midodrine 15 mg tid  - Florinef 0.1 mg bid  - Ascitic fluid studies   - Follow Blood cultures  - Daily CBC, CMP, Mg  - Nephrology following   - GI following: EGD planned for 6/14  - Wound care consulted  - IV Protonix 40mg bid   - Pain management: tylenol prn, IV dilaudid prn for severe pain         Please see the primary team's daily progress note for the patient's full plan.    Brennan Staples MD  Family Medicine Resident

## 2024-06-12 NOTE — PROGRESS NOTES
To whom it may concern:     Marion Hopkins has been accompanying her mother who is currently admitted to our service at [facility]. Please excuse her from work for these extenuating circumstances.     Thank you,    Dr. Bobby Wolff  [facility]  6/12/24

## 2024-06-12 NOTE — PROGRESS NOTES
[facility] Dosing Services: 06/12/24  Consult for Warfarin Dosing by Pharmacy by Dr. Tarango  Consult provided for this 69 yo female for indication of Hx of DVT  Day of Therapy: Resumed from home.  PTA Warfarin dose: 1mg daily on Sat/Sun/Mon/Tues/Thurs/Fri; 2.5mg on Wed (Confirmed with patient today --> appeared knowledgeable of home regimen; Updated PTA med list).  Dose to achieve an INR goal of 2-3  INR = 2.9 (down from 3). Last dose 0.5 mg on 6/1/24. Vitamin K PO 5 mg given 5/30/24.    Warfarin dose: Give acute DDI's with below meds and INR 2.9, will continue holding Warfarin today; Consider redosing Warfarin once INR lower.     Significant drug interactions: Fluconazole, Florinef, Hydrocortisone, Zosyn (can enhance Warfarin effect);    Previous dose At home   PT/INR Lab Results   Component Value Date/Time    INR 2.9 06/12/2024 04:41 AM    INR 1.8 05/03/2023 02:34 PM    INREXT 4.1 05/20/2021 12:00 AM      Platelets Lab Results   Component Value Date/Time    PLT 81 06/12/2024 04:41 AM      H/H Lab Results   Component Value Date/Time    HGB 10.9 06/12/2024 04:41 AM        Yarelis Holt, Pharm.D.

## 2024-06-12 NOTE — FLOWSHEET NOTE
Primary RN SBAR: JO Wang RN  Patient Education provided: procedural / infection control  Incapacitated Nurse [facility]. provided: Yes  Preferred Education method and Primary language: Verbal / English  Hospital associated wait time; reason: none  Hepatitis B Surface Ag   Date/Time Value Ref Range Status   06/01/2024 10:54 AM <0.10 Index Final     HEP B SURF AB   Date/Time Value Ref Range Status   09/06/2022 11:34 AM 6.37 mIU/mL Final     Hep B S Ab   Date/Time Value Ref Range Status   06/01/2024 10:54 AM <3.10 mIU/mL Final            06/12/24 1245   Vital Signs   /64   Pulse 81   Respirations 12   SpO2 100 %   Observations & Evaluations   Level of Consciousness 1   Oriented X 4   Heart Rhythm   (ICU monitoring)   Respiratory Quality/Effort Unlabored   O2 Device Nasal cannula   Skin Color   (appropriate)   Skin Condition/Temp Warm;Dry   Edema Generalized   Technical Checks   ICEBOAT I;C;E;B;O;A;T        06/12/24 1245   Vitals   /64   Pulse 81   Respirations 12   SpO2 100 %   Observations & Evaluations   Level of Consciousness 1   Oriented X 4   Heart Rhythm   (ICU monitoring)   Respiratory Quality/Effort Unlabored   O2 Device Nasal cannula   Skin Color   (appropriate)   Skin Condition/Temp Warm;Dry   Edema Generalized   Peritoneal Dialysis Catheter Right lower abdomen   No placement date or time found.   Present on Admission/Arrival: Yes  Catheter Location: Right lower abdomen   Status Deaccessed   Site Condition Clean, dry, intact   Dressing Status Clean, dry & intact   Dressing Gauze   Date of Last Dressing Change 06/11/24   Dialysis Type Continuous cycling on hold   Post-Treatment (Cycler)   Average Dwell Time (Hours:Minutes) 1:08   Lost Dwell Time (Hours:Minutes)   (Added dwell 0:02)   Effluent Appearance Clear;Yellow   I Drain (mL) 124 mL   PD Output (mL)   ( -  = -587)   Volume Gain (mL) 587 mL     Primary RN SBAR: JO Wang RN  Comments: Repeat Cell count hand delivered to lab. Patient  drained completely prior to disconnection. Patient to transition to HD until abdominal pain resolves. Weekly dressing changes until PD restarted.

## 2024-06-12 NOTE — CARE COORDINATION
Transition of Care Plan:   Pt is a readmission    RUR: 26%  Prior Level of Functioning: home health with [facility]    Disposition: depending upon family's decision:  Pt will either return [facility] or [facility] is willing to help transition pt into home hospice when pt returns home    If pt returns home with home health, management will need a resumption of home health order.  If pt/family chooses home with hospice,case management will need a home hospice order.    I discussed plan of care with [facility] again today([phone])    When therapy lat worked with pt,their recommendation was SNF.    Code Status: DNR     Advance Care Planning:    Primary Decision Maker: Sandeep Reynolds,[facility] - [phone]    Work excuse provided to daughter by primary team.    Tegan Solorzano RN

## 2024-06-12 NOTE — PROGRESS NOTES
[facility] Family Medicine Residency Program  Affiliated with [facility] and [facility]     Resident Progress Note in Brief      SUBJECTIVE  Patient is awake and alert. Pain is not controlled. Reports feeling unwell due to abdominal pain. Declines abdominal exam. No other complaints or concerns at the time.  & family at bedside.  & family had questions about diagnosis - discussed at this time abdominal pain is likely due to fluid in abdomen & pancreatitis.  discussed not wanting patient to deal with the entire team of doctors - I discussed with  the nature of shift work will require different doctors to come to bedside but can discuss the matter further with day team.  also wanted to discuss type of bags used in patient's dialysis - recommended patient to discuss with nephrologist.       OBJECTIVE  /76   Pulse 93   Temp 97.6 °F (36.4 °C) (Oral)   Resp 22   Ht 1.778 m (5' 10\")   Wt (!) 136.3 kg (300 lb 8 oz)   SpO2 97%   BMI 43.12 kg/m²     Intake/Output:     Intake/Output Summary (Last 24 hours) at 6/11/2024 2019  Last data filed at 6/11/2024 1200  Gross per 24 hour   Intake 416.79 ml   Output 268 ml   Net 148.79 ml       Labs and data:   Personally reviewed  Recent Labs     06/10/24  1358 06/11/24  0352   WBC 19.0* 25.0*   HGB 12.7 11.3*   HCT 38.9 33.4*   * 81*       Recent Labs     06/10/24  1358 06/11/24  0352   * 132*   K 4.5 4.7   CL 97 97   CO2 28 24   BUN 20 21*       Recent Labs     06/10/24  1358 06/11/24  0352   GLOB 3.7 3.4       Recent Labs     06/10/24  1358 06/11/24  0352   INR 3.1* 3.0*        Invalid input(s): \"PHI\", \"PCO2I\", \"PO2I\", \"FIO2I\"  No results for input(s): \"CPK\", \"CKMB\" in the last 72 hours.    Invalid input(s): \"TROIQ\", \"BNPP\"    Telemetry reviewed:  -sinus rhythm, rate 93 bpm    Ventilation/respiratory support:  -nasal canula 3L O2    Circulation/hemodynamic support:  -Fluids: none  -Pressors: none  -Stress-dose steroids:

## 2024-06-12 NOTE — ACP (ADVANCE CARE PLANNING)
Code Status: DNR     Advance Care Planning:    Primary Decision Maker: Sandeep Reynolds,Uzair - Spouse - [phone]     Pt does not have AMD on file.  In absence of verified Medical POA,  Uzair Hopkins is legal NOK and surrogate decision maker if pt is unable to speak for herself.

## 2024-06-12 NOTE — CONSULTS
Claudia Yoo PA-C                       [phone] office             Monday-Friday 8:00 am-4:30 pm  I am not permitted to use \"perfect serve\" use above for contact, thanks.      Gastroenterology Consultation Note      Admit Date: 6/10/2024  Consult Date: 6/12/2024   I greatly appreciate your asking me to see Elizabeth Hopkins, thank you very much for the opportunity to participate in her care.    Narrative Assessment and Plan   69 yo F with ESRD on peritoneal dialysis admitted with abdominal pain and septic shock. On admission BP 79/52, now on levophed. Peritoneal fluid sample with elevated cell count, peritoneal fluid culture negative to date, blood cultures negative to date. CTA abdomen pelvis with focal wall thickening of the descending duodenum, mass vs peptic ulcer. Small mount of free fluid, and a few locules of free air in the abdomen most likely relate to peritoneal dialysis catheter. No obvious evidence of hollow viscus perforation. Hgb 10.9 today, BUN/creatinine ratio 5. Last EGD 3/14/24 with normal findings aside from tortuous esophagus. On Vancomycin, Zosyn, and fuconazole for peritonitis.     Impression:  Abdominal pain   Septic shock   Peritonitis   Duodenal wall thickening on CTA    Plan:  Agree with continuing Zosyn, Vancomycin, and fluconazole for peritonitis.   Start pantoprazole 40 mg every 12 hours with concern for duodenal ulcer.   Plan for EGD Friday 6/14.   Full liquid diet for now.   Nephrology note reviewed, patient will be started hemodialysis instead of peritoneal dialysis.   Claudia Yoo PA-C    Subjective:     Chief Complaint: Abdominal pain     History of Present Illness: Ms. Hopkins is a 69 yo F with history of ESRD on peritoneal dialysis, hemorrhoids, CAD, VTE on warfarin, type 2 diabetes, and gastroparesis, admitted 6/10/24 with abdominal pain, leukocytosis with WBC count of 25k on admission,  mmol/L    Anion Gap 10 5 - 15 mmol/L    Glucose 207 (H) 65 - 100 mg/dL    BUN 26 (H) 6 - 20 MG/DL    Creatinine 5.32 (H) 0.55 - 1.02 MG/DL    BUN/Creatinine Ratio 5 (L) 12 - 20      Est, Glom Filt Rate 8 (L) >60 ml/min/1.73m2    Calcium 8.8 8.5 - 10.1 MG/DL    Total Bilirubin 0.5 0.2 - 1.0 MG/DL    ALT 13 12 - 78 U/L    AST 18 15 - 37 U/L    Alk Phosphatase 162 (H) 45 - 117 U/L    Total Protein 4.9 (L) 6.4 - 8.2 g/dL    Albumin 1.5 (L) 3.5 - 5.0 g/dL    Globulin 3.4 2.0 - 4.0 g/dL    Albumin/Globulin Ratio 0.4 (L) 1.1 - 2.2     CBC with Auto Differential    Collection Time: 06/12/24  4:41 AM   Result Value Ref Range    WBC 23.9 (H) 3.6 - 11.0 K/uL    RBC 3.51 (L) 3.80 - 5.20 M/uL    Hemoglobin 10.9 (L) 11.5 - 16.0 g/dL    Hematocrit 32.3 (L) 35.0 - 47.0 %    MCV 92.0 80.0 - 99.0 FL    MCH 31.1 26.0 - 34.0 PG    MCHC 33.7 30.0 - 36.5 g/dL    RDW 18.7 (H) 11.5 - 14.5 %    Platelets 81 (L) 150 - 400 K/uL    Nucleated RBCs 1.1 (H) 0  WBC    nRBC 0.26 (H) 0.00 - 0.01 K/uL    Neutrophils % 94 (H) 32 - 75 %    Lymphocytes % 3 (L) 12 - 49 %    Monocytes % 2 (L) 5 - 13 %    Eosinophils % 0 0 - 7 %    Basophils % 0 0 - 1 %    Immature Granulocytes % 1 (H) 0.0 - 0.5 %    Neutrophils Absolute 22.5 (H) 1.8 - 8.0 K/UL    Lymphocytes Absolute 0.7 (L) 0.8 - 3.5 K/UL    Monocytes Absolute 0.5 0.0 - 1.0 K/UL    Eosinophils Absolute 0.0 0.0 - 0.4 K/UL    Basophils Absolute 0.0 0.0 - 0.1 K/UL    Immature Granulocytes Absolute 0.2 (H) 0.00 - 0.04 K/UL    Differential Type SMEAR SCANNED      RBC Comment ANISOCYTOSIS  1+        RBC Comment TARGET CELLS  PRESENT       Protime-INR    Collection Time: 06/12/24  4:41 AM   Result Value Ref Range    INR 2.9 (H) 0.9 - 1.1      Protime 28.2 (H) 9.0 - 11.1 sec   Phosphorus    Collection Time: 06/12/24  4:41 AM   Result Value Ref Range    Phosphorus 4.0 2.6 - 4.7 MG/DL   Lactic Acid    Collection Time: 06/12/24  8:05 AM   Result Value Ref Range    Lactic Acid, Plasma 5.9 (HH) 0.4 - 2.0

## 2024-06-12 NOTE — FLOWSHEET NOTE
06/12/24 0030   Vitals   BP (!) 95/53   Pulse 90   Respirations 15   SpO2 97 %   Observations & Evaluations   Level of Consciousness 0   Heart Rhythm Regular   Respiratory Quality/Effort Dyspnea with exertion   FiO2  36 %   O2 Device PAP (positive airway pressure)   Skin Color Other (comment)  (Appropriate for ethnicity)   Skin Condition/Temp Warm;Dry   Abdomen Inspection Obese   Edema Generalized   Edema Generalized +2   RLE Edema +2   LLE Edema +2   Peritoneal Dialysis Catheter Right lower abdomen   No placement date or time found.   Present on Admission/Arrival: Yes  Catheter Location: Right lower abdomen   Status Accessed   Site Condition Clean, dry, intact   Dressing Status New dressing applied  (Exit site cleansed with Exsept.  Gentamicin 1% ointment applied and dressed with dry/sterile gauze.)   Dressing Gauze   Date of Last Dressing Change 06/12/24   Dialysis Type Continuous cycling   Exit Site Condition Good   Catheter Care Given Yes  (Two minute Alcavis scrub/soak performed prior to connection.)   Cycler   Verification of Prescription CCPD   Informed Consent  Yes   Total Volume Programmed 29273 mL   Therapy Time (Hours:Minutes) 9:00   Cycler Type [facility]en [facility]   Fill Volume 3000 mL   Last Fill Volume 0 mL   Dextrose Setting Same (Nonextraneal)   I Drain Alarm 0 mL   Number of Cycles 5   Bag Volume 6000 mL   Number of Bags Used 3   Dianeal Solution Other (Comment)  (1.5%  Dextrose in 6000 ml)        06/12/24 0030   Technical Checks   ICEBOAT I;C;E;B;O;A;T     Primary RN SBAR: Carmina Mcneal RN  Patient Education provided: CCPD procedure  Incapacitated Nurse [facility]. provided: Carmina Mcneal RN  Preferred Education method and Primary language: Explanation/English  Hospital associated wait time; reason: N/A    Comments:  New cassette primed and tested.  After catheter disinfection, sterile connection made and therapy initiated.  Remained with the patient for the initial drain and beginning of the first  fill.  Patient left with the bed low, side rails up X 3, call bell and belongings in reach.  0100 Richi Aragon NP paged and order obtained for one time last fill tonight for specimen collection in the morning. Cycler settings adjusted.        Hepatitis B Surface Ag   Date/Time Value Ref Range Status   06/01/2024 10:54 AM <0.10 Index Final     HEP B SURF AB   Date/Time Value Ref Range Status   09/06/2022 11:34 AM 6.37 mIU/mL Final     Hep B S Ab   Date/Time Value Ref Range Status   06/01/2024 10:54 AM <3.10 mIU/mL Final

## 2024-06-12 NOTE — PROGRESS NOTES
Follow up attempted for Mrs Hopkins in ICU.  Family has left at this time. Pt asleep. Reviewed chart notes on today's events and care plans, and consulted with attending RN's. Chaplains remain available for continued care and support.   Please contact Spiritual Care for any emotional and spiritual needs and/or referrals. Thank you.    Chaplain Uzair Alvarado M.Div., [facility].  [facility] Team [phone]

## 2024-06-12 NOTE — PROGRESS NOTES
[facility] Dosing Services: Antimicrobial Stewardship Daily Doc  Consult for antibiotic dosing of Vancomycin by Dr. Tarango  Indication: SBP, HAP  Day of Therapy: 3 of 14  ESRD on PD    Ht Readings from Last 1 Encounters:   06/11/24 1.778 m (5' 10\")        Wt Readings from Last 1 Encounters:   06/10/24 (!) 136.3 kg (300 lb 8 oz)      Vancomycin therapy:  Loading dose: Vancomycin 2500 mg x1 dose now/given  Maintenance dose: Vancomycin dosing per levels due to PD   Dose calculated to approximate a           b. Random Level 15-20 mcg/mL   Last level: in 3 days   Assess/Plan: WBC 23.9 (slightly down from 25); Hypotensive this am (on Florinef); Afeb; Last Vanc (2500mg) given 6/10 @ 1607; Continue random dosing around PD.   Dose administration notes: Doses given appropriately based on levels.   Check level 6/13 AM (ordered) and dose adjust accordingly     Non-Kinetic Antimicrobial Dosing Regimen:   Current Regimen:  Zosyn 3.375 mg IV q12hr  Recommendation: Continue same  Dose administration notes: Doses given appropriately as scheduled    Other Antimicrobial   (not dosed by pharmacist) Fluconazole 100 mg PO QD (candidal peritonitis px)     Cultures Blood cultures (x2): Pending  Fluid culture: Pending   Serum Creatinine Lab Results   Component Value Date/Time    CREATININE 5.44 06/11/2024 03:52 AM          Creatinine Clearance On PD     Temp Temp: 97.8 °F (36.6 °C) (Oral)       WBC Lab Results   Component Value Date/Time    WBC 25.0 06/11/2024 03:52 AM          Procalcitonin Lab Results   Component Value Date/Time    PROCAL 0.55 05/30/2024 05:09 PM      For Antifungals, Metronidazole and Nafcillin: Lab Results   Component Value Date/Time    ALT 12 06/11/2024 03:52 AM    AST 19 06/11/2024 03:52 AM        Pharmacist  Yarelis Gutierres, Pharm.D.   [phone]

## 2024-06-12 NOTE — PROGRESS NOTES
0800 - Notified family practice that patient is having 10/10 cramping/sharp abdominal pain but had already gotten PRN Dilaudid at 0606 and cannot be given again until 1000 and patient refused to take tylenol. MD changed orders and said okay to give another dose of dilaudid now. MD stated patient can have her PO meds this morning.  Turned patient to left side - patient stated she felt uncomfortable and did not want to be turned, educated patient that she has had a previous pressure injury before and it is important for her skin to turn her - patient still refused to be turned.     0901 - Notified Dr. De León that patient's blood pressures have been low even though MAP is above 65 and lactic acid is 5.9 - received orders to restart levo drip to keep systolic BP above 90.     0911 - GI PA at bedside - received orders for patient to start full liquid diet and potential EGD on Friday.     1107 - Spoke to Dr. De León about lactic acids - lactic acid labs can be Q6 now, next one due at 1600 today.     1140 - Spoke to Dr. Fuentes on the phone about patient's left arm fistula - patient will get HD tomorrow.     1300 - Asked patient's daughter about patient's fistula - daughter stated it hasn't been used in about a year but that nothing was wrong with it, they just decided to switch to PD instead of HD.     1625 - Notified Dr. De León patient's lactic acid 5.9    1900 - Bedside shift change report given to Patricia FARAH (oncoming nurse) by Lisy/Micaela (offgoing nurse). Report included the following information Nurse Handoff Report, ED Encounter Summary, ED SBAR, Adult Overview, Surgery Report, Intake/Output, MAR, Recent Results, Med Rec Status, and Cardiac Rhythm NSR .

## 2024-06-13 PROBLEM — R06.02 SHORTNESS OF BREATH: Status: ACTIVE | Noted: 2024-01-01

## 2024-06-13 PROBLEM — Z51.5 PALLIATIVE CARE ENCOUNTER: Status: ACTIVE | Noted: 2024-01-01

## 2024-06-13 PROBLEM — E43 SEVERE PROTEIN-CALORIE MALNUTRITION (HCC): Status: ACTIVE | Noted: 2024-01-01

## 2024-06-13 PROBLEM — E88.09 HYPOALBUMINEMIA: Status: ACTIVE | Noted: 2024-01-01

## 2024-06-13 NOTE — PROGRESS NOTES
PT evaluation attempted. Patient receiving dialysis at this time, unavailable for participation. PT to continue to follow for evaluation as able and appropriate.    Addendum: 1215 patient now comfort care only. PT orders were canceled by provider.    Sabrina Levy, PT, DPT

## 2024-06-13 NOTE — PROGRESS NOTES
[facility]    Elizabeth Hopkins  YOB: 1955          Assessment & Plan:     ESRD on CCPD (Keith Villaseñor)  Abd pain  Septic shock  Lactic acidosis  Meets criteria for peritonitis but atypical in presentation and likely does not explain the entirety of her condition (shock, lactic acidosis are not typical of PD related peritonitis)  HFpEF  CAD  VERÓNICA  Cirrhosis    Rec:  Seen on HD. Tolerating fairly at best. BP low despite albumin and 0 UF  Continue broad spectrum ABX  Follow up PD fluid cultures. Neg so far  Repeat PD fluid cell count improved to 31  Abd pain way out of proportion to peritonitis dx.   PD held due to severe abd pain       Subjective:   CC: ESRD  HPI: Seen on PD. In severe pain. BP low. UF reduced to 0 and giving albumin. May need pressors.    Current Facility-Administered Medications   Medication Dose Route Frequency    insulin glargine (LANTUS) injection vial 5 Units  5 Units SubCUTAneous Nightly    simethicone (MYLICON) chewable tablet 80 mg  80 mg Oral Q6H PRN    albumin human 25% IV solution 25 g  25 g IntraVENous PRN    Warfarin- Hold 6/13 PM dose   Other Once    HYDROmorphone (DILAUDID) injection 0.5 mg  0.5 mg IntraVENous Q4H PRN    Or    HYDROmorphone HCl PF (DILAUDID) injection 1 mg  1 mg IntraVENous Q4H PRN    pantoprazole (PROTONIX) 40 mg in sodium chloride (PF) 0.9 % 10 mL injection  40 mg IntraVENous Q12H    norepinephrine (LEVOPHED) 16 mg in sodium chloride 0.9 % 250 mL infusion  1-100 mcg/min IntraVENous Continuous    docusate sodium (COLACE) capsule 100 mg  100 mg Oral BID PRN    fluconazole (DIFLUCAN) tablet 100 mg  100 mg Oral Daily    diatrizoate meglumine-sodium (GASTROGRAFIN) 66-10 % solution 30 mL  30 mL Oral ONCE PRN    hydrocortisone sodium succinate PF (SOLU-CORTEF) injection 50 mg  50 mg IntraVENous Q6H    insulin lispro (HUMALOG,ADMELOG) injection vial 0-4 Units  0-4 Units SubCUTAneous Nightly     06/11/24  0352 06/12/24  0441 06/13/24  0621   * 131* 130*   K 4.7 4.3 3.9   CL 97 95* 97   CO2 24 26 23   BUN 21* 26* 30*   CREATININE 5.44* 5.32* 5.35*   GLUCOSE 201* 207* 180*   CALCIUM 9.3 8.8 8.4*           : Teresa Fuentes MD  6/13/2024        [facility]:  [email or web address]  [email or web address]    [place] office:  [address], [address]  [place] [address]  Phone: [phone]  Fax :     [phone]    [place] office:  [address] [place] [id number]  Phone - [phone]  Fax - [phone]

## 2024-06-13 NOTE — ACP (ADVANCE CARE PLANNING)
Advance Care Planning      Palliative Medicine Provider (MD/NP)  Advance Care Planning (ACP) Conversation      Date of Conversation: 06/13/24  The patient and/or authorized decision maker consented to a voluntary Advance Care Planning conversation.   Individuals present for the conversation:   Patient with decision making capacity, Spouse  , and Daughter Andria Albert LCSW    Legal Healthcare Agent(s):    Primary Decision Maker: Sandeep ReynoldsUzair - [phone]    ACP documents available in EMR prior to discussion:  -None    Primary Palliative Diagnosis(es):  Shortness of breath  Hypoalbuminemia  Acute abdominal pain  Hypotension  Declining functional status  Palliative care encounter  End of life    Conversation Summary:    Met with the patient initially without family. Pt stated \"let me go\". Asked pt to further explain and she proceeded to repeat let me go stating she is in too much pain. I briefly explained comfort care and she expressed interest. Permission obtained to contact family to discuss further.  Pt also not engaging in care and refusing care per nurse. Hemodialysis initiated. Persistent hypotension with opioids and dialysis so pressers initiated.  Revisited care goals with spouse and daughter present. Pt able to express her desire for comfort over dialysis and other life prolonging measures.   Family very emotional agreeing to honor patient's wish but pleading with the pt to continue current care.    Eventually, family agreed to transition. Comfort care orders placed. Pressers and other prolonging measures discontinued.  Hospice consulted    Resuscitation Status:    Code Status: DNR    Outcomes / Completed Documentation:  An explanation of advance directives and their importance was provided and the following forms completed:    -No new documents completed.    If new document completed, original was provided to patient and/or family member.    Copy was placed for scanning into the [facility]  EMR.      I spent 65 minutes providing separately identifiable ACP services with the patient and/or surrogate decision maker in a voluntary, in-person conversation discussing the patient's wishes and goals as detailed in the above note.       JESUS Mukherjee - NP

## 2024-06-13 NOTE — PROGRESS NOTES
NUTRITION    RD PLAN OF CARE:   Chart reviewed, discussed with Nursing.    Diet:ADULT ORAL NUTRITION SUPPLEMENT; Lunch; Renal Oral Supplement  ADULT DIET; Full Liquid; Lactose-Controlled  DIET ONE TIME MESSAGE;      Pt is admitted with Lactic acidosis [E87.20]  Pleural effusion [J90]  HAP (hospital-acquired pneumonia) [J18.9, Y95]  Septic shock (HCC) [A41.9, R65.21]  Hypotension, unspecified hypotension type [I95.9]  Pneumonia of both lungs due to infectious organism, unspecified part of lung [J18.9]  Acute pancreatitis, unspecified complication status, unspecified pancreatitis type [K85.90].      Pt to be transitioned to comfort measures.  Aggressive nutrition intervention is not indicated at this time. Could modify diet to regular/PO for comfort. Will follow and assist as needed.    Tatum Young MS, RD, [facility]  Ext: [phone], or via [employer]

## 2024-06-13 NOTE — PROGRESS NOTES
[facility] Dosing Services: 06/13/24  Consult for Warfarin Dosing by Pharmacy by Dr. Tarango  Consult provided for this 69 yo female for indication of Hx of DVT  Day of Therapy: Resumed from home.  PTA Warfarin dose: 1mg daily on Sat/Sun/Mon/Tues/Thurs/Fri; 2.5mg on Wed (Confirmed with patient today --> appeared knowledgeable of home regimen; Updated PTA med list).  Dose to achieve an INR goal of 2-3  INR = 2.7 (down from 2.9). Last dose 0.5 mg on 6/1/24. Vitamin K PO 5 mg given 5/30/24.    Warfarin dose: Give acute DDI's with below meds and INR 2.7, will continue holding Warfarin today; Consider redosing Warfarin once INR <= 2.5.     Significant drug interactions: Fluconazole, Florinef, Hydrocortisone, Zosyn (can enhance Warfarin effect);    Previous dose At home   PT/INR Lab Results   Component Value Date/Time    INR 2.7 06/13/2024 06:21 AM    INR 1.8 05/03/2023 02:34 PM    INREXT 4.1 05/20/2021 12:00 AM      Platelets Lab Results   Component Value Date/Time    PLT 70 06/13/2024 06:21 AM      H/H Lab Results   Component Value Date/Time    HGB 9.9 06/13/2024 06:21 AM        Thanks,   David Jean, PharmD

## 2024-06-13 NOTE — PROGRESS NOTES
Claudia Yoo PA-C                       [phone] office             Monday-Friday 8:00 am-4:30 pm  I am not permitted to use \"perfect serve\" use above for contact, thanks.        Gastroenterology Progress Note    June 13, 2024  Admit Date: 6/10/2024         Narrative Assessment and Plan   69 yo F with ESRD on peritoneal dialysis admitted with abdominal pain and septic shock. On admission BP 79/52, now on levophed. Peritoneal fluid sample with elevated cell count, peritoneal fluid culture negative to date, blood cultures negative to date. CTA abdomen pelvis with focal wall thickening of the descending duodenum, mass vs peptic ulcer. Small mount of free fluid, and a few locules of free air in the abdomen most likely relate to peritoneal dialysis catheter. No obvious evidence of hollow viscus perforation. Hgb 10.9 today, BUN/creatinine ratio 5. Last EGD 3/14/24 with normal findings aside from tortuous esophagus. On Vancomycin, Zosyn, and fluconazole for peritonitis.     6/13/24: Hgb 9.9 today, WBC elevated at 24.6. On pantoprazole 40 mg BID and antimicrobial coverage for peritonitis. Significant abdominal pain. Patient and family had meeting with palliative care today and decided to pursue hospice care.      Impression:  Abdominal pain   Septic shock   Peritonitis   Duodenal wall thickening on CTA     Plan:  Initial plan had been to manage peritonitis with vancomycin zosyn, and fluconazole, stop peritoneal dialysis and start hemodialysis, continue IV pantoprazole 40 mg BID, and perform endoscopy tomorrow to assess for duodenal ulcer vs mass. Concern for new duodenal ulcer with no clear evidence on imaging of perforation or abscess formation.   Patient and family are pursue hospice care, we will cancel EGD for tomorrow and sign off.     Claudia Yoo PA-C    Subjective:   hief Complaint: Abdominal pain

## 2024-06-13 NOTE — PROGRESS NOTES
Palliative Medicine      Code Status: DNR    Advance Care Planning:  Primary Decision Maker: Sandeep Coley.,Uzair - Spouse - [phone]     Pt does not have AMD on file.  In absence of verified Medical POA,  Uzair Hopkins is legal NOK and surrogate decision maker if pt is unable to speak for herself.     Patient / Family Encounter Documentation    Participants (names): Pt,  Uzair, dtr Roosevelt, Palliative Medicine (NP Bhavna, LCSW Andria)    Narrative: Palliative team made visit to pt in morning after learning from RN that pt was in significant distress.  Pt was receiving dialysis, family was not present.  Significant change noted from yesterday's visit; pt was moaning constantly, staying \"let me go,\" reports pain everywhere.  NP reached out to  to request that family come to the hospital to discuss goc.    Met with  and dtr outside of room and at the bedside; GAGAN Kennedy and dialysis nurse were present at bedside as well.  Family stated pt has been saying \"let me go\" for the past 3 weeks; dtr noted yesterday that pt's condition has been declining for some time.  Pt was able to express again with family present that she is suffering, acknowledged that her family does not want her to die but stated \"you don't feel the pain I'm feeling.\"  Pt requested for hand to be held, stated several times that she does not want her grandchildren to see her in current condition.      Psychosocial Issues Identified/ Resilience Factors:  Dtr was very tearful/emotional, had to step out of room, stated she could not watch pt die.   appears to be reeling from pt's rapid decline since last night; recliner was pulled up to bedside for  to sit with pt and hold her hand.  No spiritual concerns identified;  was updated re: pt's decline.     Caregiver [place]: Family was having increased difficulty caring for pt at home in the past weeks; pt is not expected to survive hospitalization.   Does the  caregiver feel confident administering medication? N/a  Does the caregiver need any help connecting with community resources? Not at this time  Does the caregiver feel confident assisting with activities of daily living? N/a    Goals of Care / Plan: Transition to comfort measures today, including stopping dialysis and liberalizing meds to address pt's pain/distress.  Will follow for ongoing support to pt/family.     Thank you for including Palliative Medicine in the care of Ms. Hopkins.     Andria Ambrose, PARUL, [facility]-  [phone])

## 2024-06-13 NOTE — PROGRESS NOTES
Paged by Palliative CM of change of care plan; that Pt and family decided to transition to Comfort Care measures.  responded to ICU, consulted with staff and met family at bedside. Pt's , Mr Dunne and their daughter are present. Mr Dunne shared tearfully about how Mrs Hopkins is a spiritual mother in their Orthodoxy and the Matriarchy of the family. They have 2 sons and 1 daughter, sons and other family have been notified and on the way.  offered supporting and comforting words and presence. Mr Dunne expressed his gratitude. Chaplains remain available for continued care and support.     Chaplain Uzair Alvarado M.Div., [facility].  [facility] Team [phone]

## 2024-06-13 NOTE — PROGRESS NOTES
[id number] [id number]   Office [phone]  Fax [phone]          Subjective / Objective     Subjective  Overnight Events: OCTAVIAEON  Patient seen and examined at bedside. Reports persistent abdominal pain with nausea, no BM. Denies CP, SOB, N/V, dysuria.    Respiratory:     Vitals:    06/13/24 0500   BP: (!) 116/58   Pulse: 98   Resp: 18   Temp:    SpO2:      Physical Examination:   General: Not in acute distress. Alert. Cooperative.   Head: Normocephalic. Atraumatic.   Eyes:              Conjunctiva pink. Sclera white. PERRL.   Throat: Mucosa pink. Moist mucous membranes. No tonsillar exudates or erythema. Palate movement equal bilaterally.   Neck: Supple. Normal ROM. No stiffness.   Respiratory: CTAB, reduced air entry in lower zones bilaterally. No w/r/r/c.   Cardiovascular: RRR. Normal S1,S2. No m/r/g. Pulses 2+ throughout.   GI: + bowel sounds. Generalized tenderness. Moderately distended.    Extremities: +2 LE edema. Distal pulses intact.    Musculoskeletal: Full ROM in all extremities.    Skin: Warm, dry. No rashes.    Neuro: A&Ox4. CN II-XII grossly intact. Strength 4/5 in all extremities.        I/O:  06/12 0701 - 06/13 0700  In: 715.8 [I.V.:13.8]  Out: 0   Inpatient Medications    Current Facility-Administered Medications   Medication Dose Route Frequency    HYDROmorphone (DILAUDID) injection 0.5 mg  0.5 mg IntraVENous Q4H PRN    Or    HYDROmorphone HCl PF (DILAUDID) injection 1 mg  1 mg IntraVENous Q4H PRN    pantoprazole (PROTONIX) 40 mg in sodium chloride (PF) 0.9 % 10 mL injection  40 mg IntraVENous Q12H    Warfarin - No Warfarin dose today (6/12; INR - 2.9)   Other Once    norepinephrine (LEVOPHED) 16 mg in sodium chloride 0.9 % 250 mL infusion  1-100 mcg/min IntraVENous Continuous    docusate sodium (COLACE) capsule 100 mg  100 mg Oral BID PRN    fluconazole (DIFLUCAN) tablet 100 mg  100 mg Oral Daily    diatrizoate meglumine-sodium (GASTROGRAFIN) 66-10 % solution 30 mL

## 2024-06-13 NOTE — PROGRESS NOTES
attended Interdisciplinary Team (IDT) rounds in ICU with medical/care team members where this patient's ongoing care was discussed.  Outcome:  updated on Pt., and Interdisciplinary Team aware of  availability for patient, family and staff.  Thank you.    Chaplain Uzair Alvarado M.Div., [facility].   [phone])

## 2024-06-13 NOTE — FLOWSHEET NOTE
Primary RN SBAR: Sabrina Michaels RN  Patient Education provided: Dialysis treatment order, use of AVF access  Incapacitated Nurse edu. provided: yes  Preferred Education method and Primary language: Verbal/English  Hospital associated wait time; reason: none  Hepatitis B Surface Ag   Date/Time Value Ref Range Status   06/01/2024 10:54 AM <0.10 Index Final     HEP B SURF AB   Date/Time Value Ref Range Status   09/06/2022 11:34 AM 6.37 mIU/mL Final     Hep B S Ab   Date/Time Value Ref Range Status   06/01/2024 10:54 AM <3.10 mIU/mL Final      PRE HD TREATMENT   06/13/24 0830   Vital Signs   /67   Temp 97.5 °F (36.4 °C)   Pulse 97   Respirations 17   Pain Assessment   Pain Assessment 0-10   Pain Level 10   Pain Location Generalized   Pain Orientation   (\"all over\" per patient)   Pain Descriptors Aching   Pain Frequency Continuous   Patient's Stated Pain Goal 0 - No pain   Treatment   Time On 0904   Time Off 1140   Treatment Goal 1-2L   Observations & Evaluations   Level of Consciousness 0   Oriented X 4   Heart Rhythm   (sinus rythm on tele monitor)   Respiratory Quality/Effort Unlabored   O2 Device Nasal cannula   Bilateral Breath Sounds Diminished   Skin Color   (appropriate for race)   Skin Condition/Temp Warm   Abdomen Inspection Soft  (With PD cath)   RUE Edema +2   LUE Edema +2;Pitting   RLE Edema +4   LLE Edema +4   Technical Checks   Dialysis Machine No. 02   RO Machine Number [id number]   Dialyzer Lot No. [id number]   Tubing Lot Number [id number]   All Connections Secure Yes   NS Bag Yes   Saline Line Double Clamped Yes   Dialyzer Revaclear 300   Prime Volume (mL) 200 mL   ICEBOAT I;C;E;B;O;A;T   RO Machine Log Sheet Completed Yes   Machine Alarm Self Test Completed;Passed   Air Foam Detector Tested;Proper Function;pH Reading   Extracorporeal Circuit Tested for Integrity Yes   Machine Conductivity 13.7   Bleach Test (Neg) Yes   Bath Temperature 97.7 °F (36.5 °C)   Dialysis Bath   K+ (Potassium) 3   Ca+ (Calcium)  2.5   Na+ (Sodium) 138   HCO3 (Bicarb) 35   Bicarbonate Concentrate Lot No. [id number]   Acid Concentrate Lot No. [id number]   Treatment Initiation   Dialyze Hours 3.5   Treatment  Initiation Universal Precautions maintained;Lines secured to patient;Connections secured;Prime given;Venous Parameters set;Arterial Parameters set;Air foam detector engaged;[place] Device;Dialysate;Saline line double clamped;Dialyzer;REV-300   Hemodialysis Fistula/Graft Left Forearm   No placement date or time found.   Present on Admission/Arrival: Yes  Orientation: Left  Access Location: Forearm   Site Assessment Clean, dry & intact   Thrill Present   Bruit Present   Status Accessed   Venous Needle Size 15 G   Arterial Needle Size 15 G   Accessed By: GAGAN Jalloh   Access Attempts  1   Access Interventions Chlorhexidine;Aseptic Technique;Needles taped to patient   Dressing Intervention New   Dressing Status Clean, dry & intact        06/13/24 0904   During Hemodialysis Assessment   BP 97/62   Pulse 97   Blood Flow Rate (ml/min) 200 ml/min  (Gradually increased to 400 without issues)   Arterial Pressure (mmHg) -150 mmHg   Venous Pressure (mmHg) 130   TMP 20      Comments HD initiated without issues. BP is soft.   Access Visible Yes   Ultrafiltration Rate (ml/hr) 720 ml/hr   Ultrafiltration Removed (ml) 0 ml      06/13/24 0935   During Hemodialysis Assessment   BP (!) 71/41   Pulse (!) 116   Blood Flow Rate (ml/min) 400 ml/min   Arterial Pressure (mmHg) -200 mmHg   Venous Pressure (mmHg) 180   TMP 10      Comments UF turned off. UF lowered, Albumin 25 g initiated.   Access Visible Yes   POST HD TREATMENT   06/13/24 1150   Vital Signs   /71   Pulse 100   Pain Assessment   Pain Assessment 0-10   Pain Level 10   Pain Location Generalized   Pain Descriptors Aching   Patient's Stated Pain Goal 0 - No pain   Post-Hemodialysis Assessment   Post-Treatment Procedures Blood returned;Access bleeding time < 10 minutes   Machine

## 2024-06-13 NOTE — PROGRESS NOTES
Occupational Therapy: defer    Chart reviewed, consulted with RN, attempted OT evaluation.  Patient just started HD at bedside.  Will follow-up as able and appropriate.    Aston Shah, OTR/L

## 2024-06-13 NOTE — CARE COORDINATION
Care Management Progress Note            06/13/24  1:27 PM  CM spoke to bedside nurse regarding hospice consult and current comfort care measures. CM has discussed patient's clinical status with CM manager. CM manager has advised this CM to reach out to [facility] so team can review and monitor for possible GIP admission tomorrow Friday 6/14. CM has sent referral to [facility] in [facility] and called liasonia Lucero. Jazmine states she will review the chart and monitor patient's clinical status, patient will be evaluated by [facility] for GIP admission tomorrow Friday 6/14 if she survives the night. CM following.        _____________________________________________________________________          Reason for Admission:   Lactic acidosis [E87.20]  Pleural effusion [J90]  HAP (hospital-acquired pneumonia) [J18.9, Y95]  Septic shock (HCC) [A41.9, R65.21]  Hypotension, unspecified hypotension type [I95.9]  Pneumonia of both lungs due to infectious organism, unspecified part of lung [J18.9]  Acute pancreatitis, unspecified complication status, unspecified pancreatitis type [K85.90]  Procedure(s) (LRB):  ESOPHAGOGASTRODUODENOSCOPY (N/A)         Patient Admission Status: Inpatient  RUR: Readmission Risk Score: 27      Hospitalization in the last 30 days (Readmission):  Yes          Transition of care plan:  Patient was discussed in IDR, patient continues medical management in the ICU. Patient has been started on HD. Palliative is following for goals of care.    Dispo: TBD. Patient was current with [facility]  prior to admission. [facility] continues to follow pt's clinical status. Updates have been sent in [employer]. If pt/family would like HH at discharge CM will need a resumption of care order. If pt/family would like Hospice at discharge CM will need a home hospice eval & treat order. CM is following.     Outpatient follow-up.    Transport TBD.        ___________________________________________   Fara  Sal RN Case Manager  6/13/2024   11:50 AM

## 2024-06-13 NOTE — PROGRESS NOTES
Name: Elizabeth Hopkins MRN: [id number]   : 1955 Hospital: [facility]   Date: 2024        Impression Plan   Septic shock  Abdominal pain- gastroparesis vs. Peritonitis vs other intestinal   Lactic acidosis  Encephalopathy  Leukocytosis  Morbid obesity  Chronic respiratory failure                levophed gtt PRN with goal MAP of >65- may need it with HD  PRN dilaudid for pain  IV antibiotics  Limited in fluid admin due to being a HD pt  Analysis of acites fluid including cell count- Does not meet criteria for SBP and contaminated by RBC, however neutrophil count overall elevated compared to previous samples so will continue to treat with antibiotics  Serial lactic acid- stable now. Will change to daily.   CT abdomen with IV contrast concerning for ulcer vs mass. Lipase decreasing despite increasing pain, so so source of pain less likely pancreatitis  GI following- possible EGD tomorrow AM  PPI to bid  Continue midodrine  Hydrocortisone IV  Currently on home O2 amount  Continue coumadin (Pt currently therapeutic)  Protonix  Patient in a lot of pain. Palliative involved to address goals of care.        Radiology  ( personally reviewed) CT abdomen reviewed: small bilateral pleural effusions and associated atelectasis.   CXR: small pleural effusions   ABG Invalid input(s): \"PHI\", \"PO2I\", \"PCO2I\"       Subjective     This patient has been seen and evaluated at the request of Dr. New for septic shock.  Patient is a 68 y.o. female with PMHx of chronic respiratory failure, ILD?, VTE. gastroparesis and peritoneal HD presenting with increasing abdominal pain and vomiting. Found to be hypotensive and placed on levophed. Fluid administration was judicial due to pt being a HD pt.  reports that they are meticulous with peritoneal catheter and that he has checked the fluid making sure it looks clear. The family feels that her abdominal pain is consistent with her gastroparesis pain,

## 2024-06-13 NOTE — PROGRESS NOTES
0700 Bedside and Verbal shift change report given to GAGAN Kennedy (oncoming nurse) by GAGAN Gomez (offgoing nurse). Report included the following information Nurse Handoff Report, ED SBAR, Adult Overview, Intake/Output, MAR, Recent Results, and Cardiac Rhythm sr  0800 Pt assessed. A&Ox4. Follows commands. Severe generalized weakness. Pt declining turns, reposition due to generalized pain. Breath sounds diminished throughout A&P. No cough, SOB. 02 3L/NC. Sat 96%. SR. BP soft. Generalized pitting edema. No CP. Abd soft, round, diffuse tenderness upon palpation. BS hypo x4. PD cath intact with dry dsg. Anuric. BPPP.   LUE fistula, positive thrill, bruit. Started HD through LUE.  1000 Pt moaning constantly due to generalized aching. Stating \"I want to go. Please let me go\" over and over again. Notified Dr. Poe. Calling Amrita Palliative NP to come over.   1005 Palliative at bedside. Pt is continuing to state \"I want to go\" Notified  to come in to have a conversation regarding goals of care.  on his way to the hospital.  1100 Family at bedside. Updated regarding pt's condition and pt wishes not to continue dialysis and control pain and wanting to let her go. Notified Palliative to come talk with family.   1130 Pt moaning continuously. Requesting pain med. Medicated with Dilaudid IV.   1150 Palliative spoke with dgt and  regarding pt's condition, wishes to be comfortable. In agreement. Comfort care orders placed.   1200  at bedside.   1233 Pt continues to moan continuously. Requesting pain meds. Medicated with Diazepam and Dilaudid IV.  1300 Pt resting with eyes closed. No further moaning.   1400 Pt resting with eyes closed. Family at bedside.   1600 Pt reassessed. Pt appears comfortable. No moaning, anxiety. Resting with eyes closed. Answers to name. Drifts back to sleep. Family remains at bedside.   1720 Report called to GAGAN Cantu. Pt will transfer to [facility] via bed with personal belongings.

## 2024-06-13 NOTE — PROGRESS NOTES
Type: None     Last BM: 06/05/24  Edema: Generalized, Left upper extremity, Right lower extremity, Left lower extremity   Edema Generalized: +2  RUE Edema: +2  LUE Edema: +2, Pitting  RLE Edema: +4  LLE Edema: +4    Nutr. Labs:    Lab Results   Component Value Date    CREATININE 5.35 (H) 06/13/2024    BUN 30 (H) 06/13/2024     (L) 06/13/2024    K 3.9 06/13/2024    CL 97 06/13/2024    CO2 23 06/13/2024       Lab Results   Component Value Date/Time    POCGLU 195 06/13/2024 01:15 AM    POCGLU 167 06/12/2024 08:17 PM    POCGLU 179 06/12/2024 05:28 PM    POCGLU 257 06/12/2024 11:56 AM    POCGLU 172 06/12/2024 04:37 AM    POCGLU 124 06/12/2024 12:20 AM        Hemoglobin A1C   Date Value Ref Range Status   06/04/2024 4.6 4.0 - 5.6 % Final     Comment:     (NOTE)  HbA1C Interpretive Ranges  <5.7              Normal  5.7 - 6.4         Consider Prediabetes  >6.5              Consider Diabetes         Lab Results   Component Value Date/Time    MG 2.1 06/04/2024 11:19 AM       Lab Results   Component Value Date    CALCIUM 8.4 (L) 06/13/2024    PHOS 4.0 06/12/2024       Lab Results   Component Value Date    TRIG 51 03/27/2024    TRIG 78 07/24/2022       Nutr. Meds:  Scheduled Meds:   insulin glargine  5 Units SubCUTAneous Nightly    pantoprazole (PROTONIX) 40 mg in sodium chloride (PF) 0.9 % 10 mL injection  40 mg IntraVENous Q12H    warfarin placeholder: dosing by pharmacy   Other Once    fluconazole  100 mg Oral Daily    hydrocortisone sodium succinate PF  50 mg IntraVENous Q6H    warfarin placeholder: dosing by pharmacy   Other Once    insulin lispro  0-4 Units SubCUTAneous Nightly    insulin lispro  0-8 Units SubCUTAneous Q6H    sodium chloride flush  5-40 mL IntraVENous 2 times per day    piperacillin-tazobactam  3,375 mg IntraVENous Q12H    fludrocortisone  0.1 mg Oral BID    midodrine  15 mg Oral TID    vancomycin (VANCOCIN) intermittent dosing (placeholder)   Other RX Placeholder    warfarin placeholder: dosing by  pharmacy   Other RX Placeholder    dianeal lo-aura 1.5%  6,000 mL IntraPERitoneal QPM    dianeal lo-aura 1.5%  6,000 mL IntraPERitoneal QPM    dianeal lo-aura 1.5%  6,000 mL IntraPERitoneal QPM    gentamicin   Topical Daily     Continuous Infusions:   norepinephrine Stopped (06/12/24 1519)    dextrose      sodium chloride       PRN Meds: simethicone, albumin human 25%, HYDROmorphone **OR** HYDROmorphone, docusate sodium, diatrizoate meglumine-sodium, glucose, dextrose bolus **OR** dextrose bolus, glucagon (rDNA), dextrose, sodium chloride flush, sodium chloride, ondansetron **OR** ondansetron, polyethylene glycol, acetaminophen **OR** acetaminophen      Current Nutrition Intake & Therapies:    Average Meal Intake: 1-25%  Average Supplements Intake: Unable to assess  ADULT ORAL NUTRITION SUPPLEMENT; Lunch; Renal Oral Supplement  ADULT DIET; Full Liquid; Lactose-Controlled    Anthropometric Measures:  Height: 177.8 cm (5' 10\")  Ideal Body Weight (IBW): 150 lbs (68 kg)    Admission Body Weight: 136.3 kg (300 lb 7.8 oz)  Current Body Weight: 130.6 kg (287 lb 14.7 oz), 191.9 % IBW. Weight Source: Bed Scale  Current BMI (kg/m2): 41.3  Usual Body Weight: 117.9 kg (260 lb)  % Weight Change (Calculated): 10.7  Weight Adjustment For: No Adjustment                 BMI Categories: Obese Class 3 (BMI 40.0 or greater)    Estimated Daily Nutrient Needs:  Energy Requirements Based On: Kcal/kg  Weight Used for Energy Requirements: Ideal  Energy (kcal/day): 2045 (30 kcal/kg IBW - 300 from PD)  Weight Used for Protein Requirements: Ideal  Protein (g/day): 136 (2.0 g/kg IBW)  Method Used for Fluid Requirements: 1 ml/kcal  Fluid (ml/day): 2045    Nutrition Diagnosis:   Increased nutrient needs related to renal dysfunction as evidenced by dialysis  Severe malnutrition related to inadequate protein-energy intake, pain, altered GI function as evidenced by nausea, vomiting, intake 0-25%, Criteria as identified in malnutrition assessment,

## 2024-06-13 NOTE — PROGRESS NOTES
[facility]  Good Help to Those in Need  [phone]     Patient Name: Elizabeth Hopkins  YOB: 1955  Age: 68 y.o.    [facility] RN Note:  Hospice consult called in by Fara COLVIN. Awaiting intake to process. Family made the  decision to transition to CMO today. Palliative involved, medications ordered and in place. Current BP 62/49    Per CM family needs time to process prior to meeting with hospice as this decision was very difficult for them to make. Plan to eval patient in AM for GIP LOC- likely to meet criteria at that time if she is still with us.     Thank you for the opportunity to be of service to this patient.   Jazmine Villanueva RN, BSN, CHPN  Clinical Nurse Liaison  [facility]  [phone] [place]  [phone] Office   Available on Perfect Serve

## 2024-06-14 PROBLEM — R45.1 RESTLESSNESS: Status: ACTIVE | Noted: 2024-01-01

## 2024-06-14 PROBLEM — Z51.5 COMFORT MEASURES ONLY STATUS: Status: ACTIVE | Noted: 2024-01-01

## 2024-06-14 PROBLEM — R52 NONVERBAL SIGNS OF PAIN: Status: ACTIVE | Noted: 2024-01-01

## 2024-06-14 PROBLEM — J18.9 PNEUMONIA OF BOTH LUNGS DUE TO INFECTIOUS ORGANISM: Status: ACTIVE | Noted: 2024-01-01

## 2024-06-14 NOTE — H&P
[facility]   Good Help to Those in Need  [phone]     Patient Name:  Elizabeth Hopkins  YOB: 1955         Date of Provider Hospice Visit: 06/14/24     Level of Care:   [x] General Inpatient (GIP)              [] Routine                   [] Respite     Current Location of Care:  [] [facility]           [x] [facility]         [] [facility]        [] [facility]           [] [facility] ([facility])     IF [facility], patient referred from:  [] [facility]           [] [facility]         [] [facility]        [] [facility]           [] Home         [] Other:      Date of Original Hospice Admission:  6/14/2024 11:39 AM   Hospice Medical Director at time of admission:      Principle Hospice Diagnosis:   Septic shock (HCC) [A41.9, R65.21]   Diagnoses RELATED to the terminal prognosis: Acute pancreatitis, spontaneous bacterial peritonitis likely, peritoneal dialysis with end-stage renal disease, coronary disease, cirrhosis, pulmonary hypertension  Other Diagnoses:      HOSPICE SUMMARY   Elizabeth Hopkins    The patient's principle diagnosis has resulted in     Patient is a 68-year-old female with a multitude of medical issues and has had several hospitalizations recently.  This most recent hospitalization started on 6/10 as patient presented to the hospital with sepsis-like syndrome.  Patient with likely multiple sources to include bibasilar airspace disease, acute pancreatitis, concern about spontaneous bacterial peritonitis with a white count of over 20,000.  Patient treated aggressively with IV antibiotics, oral Diflucan but continued to show evidence of decline with hypotension, need for pressor support, had been maxed out on midodrine and Florinef along with stress dose steroids.  Patient already had been struggling for several months and had been discussing with family about transition to comfort.  Patient having significant evidence of pain, restlessness and ultimately agreed to transition to comfort focused care.  Because her symptoms were

## 2024-06-14 NOTE — PROGRESS NOTES
Spiritual Care Assessment/Progress Note  [facility]    Name: Elizabeth Hopkins MRN: [id number]    Age: 68 y.o.     Sex: female   Language: English     Date: 6/14/2024            Total Time Calculated: 31 min              Spiritual Assessment begun in [facility] B5 MULTI-SPECIALTY ONCOLOGY 2  Service Provided For: Patient and family together  Referral/Consult From: Hospice, Rounding  Encounter Overview/Reason: Initial Encounter    Spiritual beliefs:      [x] Involved in a clint tradition/spiritual practice:      [] Supported by a clint community:      [] Claims no spiritual orientation:      [] Seeking spiritual identity:           [] Adheres to an individual form of spirituality:      [] Not able to assess:                Identified resources for coping and support system:   Support System: Family members       [x] Prayer                  [] Devotional reading               [] Music                  [] Guided Imagery     [] Pet visits                                        [] Other: (COMMENT)     Specific area/focus of visit   Encounter:    Crisis:    Spiritual/Emotional needs: Type: Spiritual Support  Ritual, Rites and Sacraments:    Grief, Loss, and Adjustments:    Ethics/Mediation:    Behavioral Health:    Palliative Care:    Advance Care Planning:           Narrative:   Rounding. Patient was surrounded by her family. Her , daughter and nice was present during the 's visit. The family shared that the patient is suffering a lot, and the family is suffering with her. The family members seem to be supportive of each other. The family asked for blessing and a prayer of healing from pain. They asked also a prayer for the family members so that they cope with the situation with bhavin. Family members talked on behalf of the patient, saying that she was member of [employer], liked to read her bible everyday and always that she gets time.  Contact Spiritual Care for any further  referrals.    Sister Mack Arshad,  Intern  Sp[facility] Health Services  Paging Service [phone] (ROSAMARIA)

## 2024-06-14 NOTE — CARE COORDINATION
6/14/2024   CARE MANAGEMENT NOTE:  Pt transferred to the 5th floor.  EMR reviewed and handoff received from previous .    Noted plans to admit pt to [facility] hospice today.  CM will sign off but will be available if needed.  Anival

## 2024-06-14 NOTE — PROGRESS NOTES
[facility]  Good Help to Those in Need  [phone]    Inpatient Nursing Admission   Patient Name: Elizabeth Hopkins  YOB: 1955  Age: 68 y.o.       Date of Hospice Admission: 6/14/2024  Hospice Attending Elected by Patient: Pankaj Disla MD  Primary Care Physician: Nadege Perez DO  Admitting RN: Jazmine Villanueva  : DAYAN     Level of Care (GIP/Routine/Respite): [facility]  Facility of Care: [facility]  Patient Room: 527/01     HOSPICE SUMMARY     Hospice Diagnosis: Septic shock (HCC) [A41.9, R65.21]  Onset Date of Hospice Diagnosis: 06/14/24  Summary of Disease Progression Leading to Hospice Diagnosis: Per palliative note \"Elizabeth Hopkins is a 68 y.o. female  who was admitted on 6/10/2024 from home with a diagnosis of new small pleural effusions, septic shock (resolved), worsening abdominal pain, anasarca, thrombocytopenia, chf, encephalopathy, leukocytosis.     Initially presented with abdominal pain  Admitted 5/30/2024-6/4/2024 for lactic acidosis, leg edema, supratherapeutic INR, and proctitis.     PMH: diabetic gastroparesis, CKD (peritoneal dialysis), CAD, adrenal insufficiency, interstitial lung disease, cirrhosis,pulmonary fibrosis, DM2, DCHF, thromboembolism on warfarin, chronic pain, VERÓNICA, chronic afib, G6pd deficiency, GERD, Gout, HTN, TKR, Tobb use disorder, sleep apnea (Cpap), uterine cervix cancer, vit D deficiency, hemorrhoids, obesity     Patient has continued to decline, failing to respond to treatment and in severe pain. Patient and family elected to transition to comfort measures only on 06/13. Admit to IP hospice on 06/14 for ongoing uncontrolled pain and distress not relieved on regimen     Co-Morbidities:   Patient Active Problem List   Diagnosis    Sleep apnea    DM (diabetes mellitus), type 2 with renal complications (HCC)    Nephrolithiasis    Hx of deep venous thrombosis    PVC (premature ventricular contraction)    S/P ORIF (open reduction internal  follows its normal course (Disease Specific History):     Elizabeth Hopkins is a 68 y.o. who was admitted to [facility]. The patient's principle diagnosis of septic shock has resulted in multi organ failure.  Functionally, the patient's Palliative Performance Scale has declined over a period of days and is estimated at 10-20. Objective information that support this patients limited prognosis includes: severe pain, altered mental status, poor PO intake, dyspnea.  The patient/family chose comfort measures with the support of Hospice.    Patient meets for GIP LOC as evidenced by Need for IV medications, unstable to transport from hospital, frequent nursing assessments and medication adjustments     Prognosis estimated based on 06/14/24 clinical assessment is:   [] Few to Many Hours  [x] Hours to Days   [] Few to Many Days   [] Days to Weeks   [] Few to Many Weeks   [] Weeks to Months   [] Few to Many Months    ASSESSMENT    Patient self-reports:  []  Yes    [x] No    SYMPTOMS: pain, dyspnea, terminal agitation, secretions    SIGNS/PHYSICAL FINDINGS: grimacing to gentle touch, able to nod head \"yes\" when asked if in pain, audible groaning, irregular breathing, use of accessory muscles    KARNOFSKY: 10        Learning Assessment:  Patient is unable to learn      Caregiver is willing to learn and receptive to information at bedside       CLINICAL INFORMATION     Wt Readings from Last 3 Encounters:   06/12/24 130.6 kg (287 lb 14.7 oz)   06/01/24 (!) 136.4 kg (300 lb 11.3 oz)   05/01/24 124.7 kg (275 lb)     Ht Readings from Last 3 Encounters:   06/13/24 1.778 m (5' 10\")   06/03/24 1.778 m (5' 10\")   05/30/24 1.778 m (5' 10\")     There is no height or weight on file to calculate BMI.    There were no vitals filed for this visit.    LAB VALUES  [unfilled]  [unfilled]  No results found for: \"TP\"    Currently this patient has:  [x] Supplemental O2 [x] Peripheral IV  [] PICC    [] PORT   [] Howard Catheter []

## 2024-06-14 NOTE — PLAN OF CARE
Problem: Chronic Conditions and Co-morbidities  Goal: Patient's chronic conditions and co-morbidity symptoms are monitored and maintained or improved  [facility]     Problem: Hospice Orientation  Goal: Demonstrate understanding of hospice philosophy, plan of care, and inpatient hospice program  Description: The patient/family/caregiver will demonstrate understanding of hospice philosophy, plan of care and the inpatient hospice program as evidenced by participation in meeting the patient's psychosocial, spiritual, medical, and physical needs inclusive of medical supplies/equipment focusing on symptoms.  Outcome: HH/[facility] Progressing     Problem: Potential for Alteration in Skin Integrity  Goal: Monitor skin for areas of alteration in skin integrity  Description: Patient [unfilled] will remain free from alterations of or worsening skin integrity as evidenced by no changes to skin during assessment each shift during the inpatient hospice stay.  Outcome: HH/[facility] Progressing     Problem: Risk for Falls  Goal: Fall prevention  Description: Patient  will remain free from falls as evidenced by no witnessed or reported falls each shift during the inpatient hospice stay.     Patient  and or family/caregiver will receive education on fall prevention as evidenced by verbalizing recall of using the call lights system, fall prevention devices, and asking for help during the admission process and ongoing as needed during the inpatient hospice stay.    Outcome: HH/[facility] Progressing     Problem: Alteration in Mobility  Goal: Remain as independent as possible and remain safe in environment  Description: Patient  will perform tasks or ADLs within their capabilities as often as they are able, as evidenced by remaining free of injury during the inpatient hospice stay.     Outcome: HH/[facility] Progressing     Problem: Pain  Goal: Control of acute pain  Description: Patient  will exhibit a decrease in pain as evidenced by a pain  rating less than 4 on the Adult Nonverbal Pain scale within 1 hour of receiving pain medication during the inpatient hospice stay.    Outcome: HH/[facility] Progressing     Problem: Anticipatory Grief  Goal: Explore reactions to and verbalize acceptance of impending loss  Description: Patient  and or family/caregiver will demonstrate appropriate behavior related to impending loss as evidenced by the absence of displaced anger or the ability to verbalize feelings of grief during the inpatient hospice stay.    Outcome: HH/[facility] Progressing     Problem: Anxiety/Agitation/Restlessness  Goal: Verbalize or staff assess the ability to manage anxiety  Description: Patient  will demonstrate appropriate behavior as evidenced by less than two episodes of fidgeting and yelling out each shift during the inpatient hospice stay.      Outcome: HH/[facility] Progressing     Problem: Communication Deficit  Goal: Effectively communicate symptoms, needs, and concerns  Description: Patient  and/or family/caregiver will be able to communicate symptoms, needs, and concerns as evidenced by the use of language services during the inpatient hospice stay.    Outcome: HH/[facility] Progressing     Problem: Coping and Emotional Distress  Goal: Demonstrate acceptance of terminal illness and understanding of disease progression  Description: Patient  and or family/caregiver will be provided education as needed by the interdisciplinary team as evidenced by verbalizing recall related to education in the dying process during admission and ongoing as needed during inpatient hospice stay.    Outcome: HH/[facility] Progressing     Problem: Depression  Goal: Verbalize and demonstrate ability to manage depression  Description: Patient  and or family/caregiver will demonstrate the use of techniques to manage depression as evidenced by decreased moodiness during the inpatient hospice stay.    Outcome: HH/[facility] Progressing     Problem: Breathing Pattern - Ineffective  Goal: Use of

## 2024-06-14 NOTE — PROGRESS NOTES
[facility]  Good Help to Those in Need  [phone]     Patient Name: Elizabeth Hopkins  YOB: 1955  Age: 68 y.o.    [facility] RN Note:  Patient seen this AM at bedside for hospice follow up. Patient is lethargic but able to weakly nod \"yes\" to pain. She is consistently moaning, brows burrowed. Secretions noted     at bedside. Reviewed hospice levels of care and services. Patient clearly meets IP LOC for severe and uncontrolled symptom burden. He is amenable but would like to wait until his daughter returns to bedside to make final decision.  He does agree to allowing adjustment in medications for the time being.     D/W hospice medical director, Dr. Disla. Dilaudid increased to 3mg and will request RN give Valium one time now. Will likely need scheduled medications once formally admitted    Plan to admit GIP hospice once daughter returns bedside.      Thank you for the opportunity to be of service to this patient.   Jazmine Villanueva RN, BSN, CHPN  Clinical Nurse Liaison  [facility]  [phone] Mobile  [phone] Office   Available on [employer]

## 2024-06-14 NOTE — PROGRESS NOTES
[id number] [id number]   Office [phone]  Fax [phone]          Subjective / Objective     Subjective  Overnight Events: Transitioned to comfort care after meeting with palliative team    Patient seen and examined at bedside. Reports persistent abdominal pain with nausea, no BM. Denies CP, SOB, N/V, dysuria.    Respiratory:     Vitals:    06/13/24 2305   BP:    Pulse:    Resp: 16   Temp:    SpO2:      Physical Examination:   General: Looks ill   Head: Normocephalic. Atraumatic.   Eyes:              Conjunctiva pink. Sclera white. PERRL.   Throat: Mucosa pink. Moist mucous membranes.    Neck: Supple. Normal ROM. No stiffness.   Respiratory: Reduced air entry in lower zones bilaterally. No w/r/r/c.   Cardiovascular: RRR. Normal S1,S2. No m/r/g.   GI: + bowel sounds. Generalized tenderness. Moderately distended.    Extremities: +2 LE edema. Distal pulses intact.    Musculoskeletal: Full ROM in all extremities.    Skin: Warm, dry. No rashes.    Neuro: A&Ox4. CN II-XII grossly intact. Strength 4/5 in all extremities.        I/O:  06/13 0701 - 06/14 0700  In: 1029.2 [P.O.:360]  Out: 852   Inpatient Medications    Current Facility-Administered Medications   Medication Dose Route Frequency    simethicone (MYLICON) chewable tablet 80 mg  80 mg Oral Q6H PRN    HYDROmorphone HCl PF (DILAUDID) injection 2 mg  2 mg IntraVENous Q10 Min PRN    diazePAM (VALIUM) injection 5 mg  5 mg IntraVENous Q2H PRN    pantoprazole (PROTONIX) 40 mg in sodium chloride (PF) 0.9 % 10 mL injection  40 mg IntraVENous Q12H    docusate sodium (COLACE) capsule 100 mg  100 mg Oral BID PRN    glucose chewable tablet 16 g  4 tablet Oral PRN    glucagon injection 1 mg  1 mg SubCUTAneous PRN    sodium chloride flush 0.9 % injection 5-40 mL  5-40 mL IntraVENous PRN    ondansetron (ZOFRAN-ODT) disintegrating tablet 4 mg  4 mg Oral Q8H PRN    Or    ondansetron (ZOFRAN) injection 4 mg  4 mg IntraVENous Q6H PRN    polyethylene  the  possibility of pancreatitis, versus sequela of anasarca. Recommend correlation  with lipase levels.  5. Moderate bilateral pleural effusions, with bibasilar atelectasis and  superimposed patchy airspace disease which may reflect pneumonia or pulmonary  edema.  6. Enlarged main pulmonary artery is highly suggestive of pulmonary arterial  hypertension.  7. Coronary artery calcifications.  8. Small mount of free fluid, and a few locules of free air in the abdomen most  likely relate to peritoneal dialysis catheter. No obvious evidence of hollow  viscus perforation.    Electronically signed by ELIZABETH KHAN            Assessment and Plan     Elizabeth Hopkins is a 68 y.o. female awith a PMHx of ESRD on PD, hemorrhoids, CAD, VTE on Warfarin, ILD and recent admission a week ago for proctitis who is admitted for septic shock.     Septic Shock ( Comfort care) : POA WBC of 19, LA 4.61. Possible SBP, also possible airspace Dx and multiple bed wounds. CT w/ bibasilar airspace disease, increased ascites, subQ edema. Lipase 1624.   Ascitic fluid cell count does not meet criteria for SBP, however increased neutrophils. S/p levophed. Repeat fluid cell count not meeting SBP criteria.   S/p IV Vancomycin, Zosyn (6/10-6/13), PO Diflucan 100 mg daily    - O2 prn, wean as tolerated  - Stress-dose steroids, solucortef, IV pressor stopped  - Midodrine 15 mg tid discontinued in s/o comfort care  - Florinef 0.1 mg bid discontinued in s/o comfort care  - IV Protonix 40mg bid   - Pain management: tylenol prn, IV dilaudid prn for severe pain  - Management per hospice     Ao abdominal pain in s/o Acute pancreatitis and gastroparesis: worsening abdominal pain. PO Lipase 1.6K, CTA with signs of pancreatitis and duodenal ulcer vs malignancy. Gastroparesis and possible peritonitis contributory  - Hold PO erythromycin  - Protonix as above, simethicone q6hr prn   - GI consulted, appreciate continued recommendations   - Plan for EGD 6/13/24

## 2024-06-14 NOTE — PLAN OF CARE
Problem: Safety - Adult  Goal: Free from fall injury  Outcome: Progressing     Problem: ABCDS Injury Assessment  Goal: Absence of physical injury  Outcome: Progressing     
  Problem: Safety - Adult  Goal: Free from fall injury  Outcome: Progressing     Problem: Pain  Goal: Verbalizes/displays adequate comfort level or baseline comfort level  Outcome: Progressing     Problem: Skin/Tissue Integrity  Goal: Absence of new skin breakdown  Description: 1.  Monitor for areas of redness and/or skin breakdown  2.  Assess vascular access sites hourly  3.  Every 4-6 hours minimum:  Change oxygen saturation probe site  4.  Every 4-6 hours:  If on nasal continuous positive airway pressure, respiratory therapy assess nares and determine need for appliance change or resting period.  6/14/2024 0055 by Anuj Andrade RN  Outcome: Progressing  6/13/2024 1337 by Sabrina Michaels RN  Outcome: Not Progressing     Problem: ABCDS Injury Assessment  Goal: Absence of physical injury  Outcome: Progressing     Problem: Nutrition Deficit:  Goal: Optimize nutritional status  6/14/2024 0055 by Anuj Andrade RN  Outcome: Progressing  6/13/2024 1337 by Sabrina Michaels RN  Outcome: Not Progressing  Flowsheets (Taken 6/13/2024 0752 by Tatum Young RD)  Nutrient intake appropriate for improving, restoring, or maintaining nutritional needs:   Recommend appropriate diets, oral nutritional supplements, and vitamin/mineral supplements   Monitor oral intake, labs, and treatment plans     Problem: Chronic Conditions and Co-morbidities  Goal: Patient's chronic conditions and co-morbidity symptoms are monitored and maintained or improved  6/14/2024 0055 by Anuj Andrade RN  Outcome: Progressing  6/13/2024 1337 by Sabrina Michaels RN  Outcome: Not Progressing  Flowsheets (Taken 6/13/2024 0800)  Care Plan - Patient's Chronic Conditions and Co-Morbidity Symptoms are Monitored and Maintained or Improved:   Monitor and assess patient's chronic conditions and comorbid symptoms for stability, deterioration, or improvement   Collaborate with multidisciplinary team to address chronic and comorbid conditions and 
based on oxygen saturation or arterial blood gases   Encourage broncho-pulmonary hygiene including cough, deep breathe, incentive spirometry   Assess the need for suctioning and aspirate as needed   Assess and instruct to report shortness of breath or any respiratory difficulty   Respiratory therapy support as indicated  6/12/2024 2337 by Vicenta Osborn RCP  Outcome: Progressing     Problem: Skin/Tissue Integrity  Goal: Absence of new skin breakdown  Description: 1.  Monitor for areas of redness and/or skin breakdown  2.  Assess vascular access sites hourly  3.  Every 4-6 hours minimum:  Change oxygen saturation probe site  4.  Every 4-6 hours:  If on nasal continuous positive airway pressure, respiratory therapy assess nares and determine need for appliance change or resting period.  Outcome: Not Progressing     Problem: Nutrition Deficit:  Goal: Optimize nutritional status  Outcome: Not Progressing  Flowsheets (Taken 6/13/2024 0752 by Tatum Young RD)  Nutrient intake appropriate for improving, restoring, or maintaining nutritional needs:   Recommend appropriate diets, oral nutritional supplements, and vitamin/mineral supplements   Monitor oral intake, labs, and treatment plans     Problem: Chronic Conditions and Co-morbidities  Goal: Patient's chronic conditions and co-morbidity symptoms are monitored and maintained or improved  Outcome: Not Progressing  Flowsheets (Taken 6/13/2024 0800)  Care Plan - Patient's Chronic Conditions and Co-Morbidity Symptoms are Monitored and Maintained or Improved:   Monitor and assess patient's chronic conditions and comorbid symptoms for stability, deterioration, or improvement   Collaborate with multidisciplinary team to address chronic and comorbid conditions and prevent exacerbation or deterioration   Update acute care plan with appropriate goals if chronic or comorbid symptoms are exacerbated and prevent overall improvement and discharge     Problem: Respiratory - Adult  Goal:

## 2024-06-14 NOTE — DISCHARGE SUMMARY
[id number] [id number]   Office [phone]  Fax [phone]       Discharge / Transfer / Off-Service Note     Name: Elizabeth Hopkins MRN: [id number]  Sex: Female   YOB: 1955  Age: 68 y.o.  PCP: Nadege Perez DO     Date of admission: 6/10/2024  Date of discharge/transfer: 6/14/2024    Attending physician at admission: Onur Vidal.  Attending physician at discharge/transfer: Sheeba Dumont.  Resident physician at discharge/transfer: Nafisa Perry MD     Consultants during hospitalization  IP CONSULT TO PHARMACY  IP CONSULT TO NEPHROLOGY  IP CONSULT TO INTENSIVIST  IP CONSULT TO PHARMACY  IP WOUND CARE NURSE CONSULT TO EVAL  IP CONSULT TO PALLIATIVE CARE  IP CONSULT TO GI  IP CONSULT TO CASE MANAGEMENT     Admission diagnoses   Lactic acidosis [E87.20]  Pleural effusion [J90]  HAP (hospital-acquired pneumonia) [J18.9, Y95]  Septic shock (HCC) [A41.9, R65.21]  Hypotension, unspecified hypotension type [I95.9]  Pneumonia of both lungs due to infectious organism, unspecified part of lung [J18.9]  Acute pancreatitis, unspecified complication status, unspecified pancreatitis type [K85.90]    Recommended follow-up after discharge    1. Hospice     ------------------------------------------------------------------------------------------------------------------    History of Present Illness    As per admitting provider, Dr. Perry, Nafisa DING MD :   \"Elizabeth Hopkins is a 68 y.o. female with known history of ESRD on PD, hemorrhoids, CAD, VTE on Warfarin, ILD  who presents to the ER complaining of Abdominal pain. Abdominal pain has persisted since being discharged home from her previous admission 6 days ago, pain is generalized and associated with nausea and recurrent small volume NBNB emesis and sialorrhea. Also complains of having little to no PO intake d/t constant nausea, feeling very weak, constant dizziness/lightheadedness, shortness of breath and intermittent chest  80 MG tablet          Diet:  - management per hospice team.    Activity:  As tolerated     Disposition: Hospice     Discharge instructions to patient/family  Please seek medical attention for any new or worsening symptoms particularly chest pain, shortness of breath, fever, chills, nausea, vomiting, diarrhea, change in mentation, falling, weakness, bleeding.    Follow up plans/appointments  Follow-up Information    None          Nafisa Perry MD  Family Medicine Resident       For Billing    Chief Complaint   Patient presents with    Abdominal Pain

## 2024-06-15 NOTE — PROGRESS NOTES
[facility]  [facility] Help to Those in Need  [phone]    Inpatient Nursing Admission   Patient Name: Elizabeth Hopkins  YOB: 1955  Age: 68 y.o.    Death Pronouncement Note:     Called to examine patient who has .   No response to verbal and tactile stimuli.   No respiratory effort.   Absent heart sounds and pulses.   Pupils fixed and dilated.   Patient pronounced dead at 10:05  Pronounced under the supervision of  Dr. Pankaj Disla      Family at bedside and grieving appropriately.

## 2024-06-15 NOTE — PLAN OF CARE
Problem: Chronic Conditions and Co-morbidities  Goal: Patient's chronic conditions and co-morbidity symptoms are monitored and maintained or improved  6/14/2024 2154 by Anuj Andrade RN  Outcome: Progressing  6/14/2024 1153 by Jazmine Villanueva RN  [facility]     Problem: Hospice Orientation  Goal: Demonstrate understanding of hospice philosophy, plan of care, and inpatient hospice program  Description: The patient/family/caregiver will demonstrate understanding of hospice philosophy, plan of care and the inpatient hospice program as evidenced by participation in meeting the patient's psychosocial, spiritual, medical, and physical needs inclusive of medical supplies/equipment focusing on symptoms.  6/14/2024 2154 by Anuj Andrade RN  Outcome: Progressing  6/14/2024 1153 by Jazmine Villanueva RN  Outcome: DARYL/ANGELIA Progressing     Problem: Potential for Alteration in Skin Integrity  Goal: Monitor skin for areas of alteration in skin integrity  Description: Patient [unfilled] will remain free from alterations of or worsening skin integrity as evidenced by no changes to skin during assessment each shift during the inpatient hospice stay.  6/14/2024 2154 by Anuj Andrade RN  Outcome: Progressing     Problem: Risk for Falls  Goal: Fall prevention  Description: Patient  will remain free from falls as evidenced by no witnessed or reported falls each shift during the inpatient hospice stay.     Patient  and or family/caregiver will receive education on fall prevention as evidenced by verbalizing recall of using the call lights system, fall prevention devices, and asking for help during the admission process and ongoing as needed during the inpatient hospice stay.    6/14/2024 2154 by Anuj Andrade RN  Outcome: Progressing  6/14/2024 1153 by Jazmine Villanueva RN  Outcome: /ANGELIA Progressing     Problem: Alteration in Mobility  Goal: Remain as independent as possible and remain safe in environment  Description:  Patient  will perform tasks or ADLs within their capabilities as often as they are able, as evidenced by remaining free of injury during the inpatient hospice stay.     6/14/2024 2154 by Anuj Andrade RN  Outcome: Progressing  6/14/2024 1153 by Jazmine Villanueva RN  Outcome: /[facility] Progressing     Problem: Anticipatory Grief  Goal: Explore reactions to and verbalize acceptance of impending loss  Description: Patient  and or family/caregiver will demonstrate appropriate behavior related to impending loss as evidenced by the absence of displaced anger or the ability to verbalize feelings of grief during the inpatient hospice stay.    6/14/2024 2154 by Anuj Andrade RN  Outcome: Progressing  6/14/2024 1153 by Jazmine Villanueva RN  Outcome: /[facility] Progressing     Problem: Communication Deficit  Goal: Effectively communicate symptoms, needs, and concerns  Description: Patient  and/or family/caregiver will be able to communicate symptoms, needs, and concerns as evidenced by the use of language services during the inpatient hospice stay.    6/14/2024 2154 by Anuj Andrade RN  Outcome: Progressing  6/14/2024 1153 by Jazmine Villanueva RN  [facility]     Problem: Coping and Emotional Distress  Goal: Demonstrate acceptance of terminal illness and understanding of disease progression  Description: Patient  and or family/caregiver will be provided education as needed by the interdisciplinary team as evidenced by verbalizing recall related to education in the dying process during admission and ongoing as needed during inpatient hospice stay.    6/14/2024 2154 by Anuj Andrade RN  Outcome: Progressing  6/14/2024 1153 by Jazmine Villanueva RN  Outcome: /[facility] Progressing     Problem: Pressure Injury - Risk of  Goal: Prevention of pressure injury  Description: Patient  will remain free from acquired or worsening pressure injuries as evidenced by zero acquired pressure injuries or no changes to current pressure injury

## 2024-06-15 NOTE — PROGRESS NOTES
[facility]  Good Help to Those in Need  [phone]    Discharge/Death Nursing Note   Patient Name: Elizabeth Hopkins  YOB: 1955  Age: 68 y.o.    Date of Death: 06/15/24  Admitted Date: 2024  Time of Death: 1005    Facility of Care: [facility]  Level of Care: GIP  Patient Room: [facility]     Hospice Attending: Pankaj Disla MD  Hospice Diagnosis: Septic shock (HCC) [A41.9, R65.21]    Death Pronouncement   Pronouncement of death completed by: Aydee Torres, RN and GAGAN Eric    Agency staff was present at the time of death    At the time of death the patient was documented as:  Called to examine patient who has .   No response to verbal and tactile stimuli.   No respiratory effort.   Absent heart sounds and pulses.   Pupils fixed and dilated.   Patient pronounced dead at 10:05    The pt  within [facility]    The following were notified of the patient's death: MD, , nursing supvr, [employer],  to be determined    Medications were disposed of per facility protocol     Discharge Summary   Discharge Reason: Death    Summary of Care Provided:    [x] Post mortem care provided by nursing staff  [x] Notification of  home by nursing supvr  [x] Referrals/Community resources provided: bereavement  [x] Goals completed  [] Durable Medical Equipment vendor notified     Disciplines involved: [x] RN [] SW [x]  [] BULLOCK [] Vol [] PT [] OT [] ST [] BC    [x] IDT communication/notification    Attending Physician, Dr. Disla notified of death    Bereaved   Verify bereaved identified with name, address, telephone number and risk level          2024     3:04 PM   Demographics   Marital Status      Uzair Hopkins, spouse ([phone]) Bereavement low.     /cremation plans pending.

## 2024-06-15 NOTE — PROGRESS NOTES
This RN is about to give Dilaudid 3mg to patient when daughter came in and refused to give dilaudid to patient unless patient is uncomfortable or moaning. Informed Uzair Banegas Pharmacist about the waste of opened medication. 3mg Dilaudid wasted with Luisito Lei RN.

## 2024-06-15 NOTE — PROGRESS NOTES
Notes:  Nurse charge notified the death of the patient. Family members were gathered grieving the lose of their loved one. When  arrived the family was waiting for the younger son of the . The  was asked to wait for the son so that they pray together. The  waited for the son, and when all are gathered together, the  shared scripture, word of consolation to those present and those family members unable to come. Prayer offered for the eternal rest of the . Family was grateful for the prayer and blessings. Please contact [facility] if/when needed.     Sister Mack Arshad,  Intern  [facility]  Paging Service [phone] (ROSAMARIA)

## 2024-06-16 LAB
BACTERIA SPEC CULT: NORMAL
BACTERIA SPEC CULT: NORMAL
SERVICE CMNT-IMP: NORMAL
SERVICE CMNT-IMP: NORMAL

## 2024-06-17 ENCOUNTER — CLINICAL DOCUMENTATION (OUTPATIENT)
Age: 69
End: 2024-06-17

## 2024-06-17 ENCOUNTER — TELEPHONE (OUTPATIENT)
Age: 69
End: 2024-06-17

## 2024-06-17 NOTE — DISCHARGE SUMMARY
Hospice Discharge Summary    [facility]  [facility] Help to Those in Need        Date of Admission: 6/14/2024  Date of Discharge: 6/15/2024    Elizabeth Hopkins is a 68 y.o. year old who was admitted to [facility] at [facility] with a Hospice diagnosis of Septic shock (HCC) [A41.9, R65.21].      The patient's care was focused on comfort and the patient passed away on 6/15/2024.

## 2024-06-17 NOTE — ED NOTES
SUBJECTIVE:    Patient ID: Erica Masterson is a 82 y.o. female.    Chief Complaint: Follow-up    She presents today with complaints of posterior neck discomfort radiating into the left anterior cervical region.  Just started this morning.  No radicular arm symptoms.  Pain level is 5/10 and interferes with quality of life in terms of activities of daily living recreation and social activities.  I note she had an ultrasound of the soft tissues of the neck done 06/12/2024 which showed prior thyroidectomy and several normal size lymph nodes.          Past Medical History:   Diagnosis Date    Adenomatous polyp of colon 3/26/2012    Allergy     Anxiety     Cancer     Cataract     Colon polyp     Degenerative arthritis of knee 04/03/2012    Diverticulosis     Encounter for blood transfusion     GERD (gastroesophageal reflux disease)     History of thyroid cancer 2021    Hyperlipidemia     Hypertension     Lateral meniscal tear 5/20/2013    Multinodular goiter 03/26/2012    Myopathy, unspecified 01/18/2010    Tuberculosis      Social History     Socioeconomic History    Marital status:    Tobacco Use    Smoking status: Never    Smokeless tobacco: Never   Substance and Sexual Activity    Alcohol use: Not Currently     Comment: stopped 2019    Drug use: No    Sexual activity: Not Currently     Social Determinants of Health     Financial Resource Strain: Low Risk  (4/9/2024)    Overall Financial Resource Strain (CARDIA)     Difficulty of Paying Living Expenses: Not hard at all   Food Insecurity: No Food Insecurity (4/9/2024)    Hunger Vital Sign     Worried About Running Out of Food in the Last Year: Never true     Ran Out of Food in the Last Year: Never true   Transportation Needs: No Transportation Needs (4/9/2024)    PRAPARE - Transportation     Lack of Transportation (Medical): No     Lack of Transportation (Non-Medical): No   Physical Activity: Sufficiently Active (4/9/2024)    Exercise Vital Sign     Days of  Bedside and Verbal shift change report given to nick berman (oncoming nurse) by Israel Henning (offgoing nurse). Report included the following information SBAR, ED Summary, MAR and Recent Results. Exercise per Week: 3 days     Minutes of Exercise per Session: 60 min   Stress: No Stress Concern Present (4/9/2024)    [employer] - Occupational Stress Questionnaire     Feeling of Stress : Not at all   Housing Stability: Low Risk  (4/9/2024)    Housing Stability Vital Sign     Unable to Pay for Housing in the Last Year: No     Number of Places Lived in the Last Year: 1     Unstable Housing in the Last Year: No     Past Surgical History:   Procedure Laterality Date    BREAST BIOPSY Left 2015    benign    CHOLECYSTECTOMY      COLONOSCOPY  12/02/2015    Dr. Britton, multiple polyps, recheck five years-three years if more than 2 polyps are adenomatous    COLONOSCOPY N/A 12/02/2015    COLONOSCOPY N/A 03/20/2019    Procedure: COLONOSCOPY;  Surgeon: Jose Britton MD;  Location: [facility];  Service: Endoscopy;  Laterality: N/A;    COLONOSCOPY N/A 05/20/2020    Dr. Britton; internal hemorrhoids; diverticulosis; polyps removed; repeat in 3 years    COLONOSCOPY N/A 11/16/2023    Procedure: COLONOSCOPY(instructions mailed 11/9);  Surgeon: Jose Britton MD;  Location: [facility];  Service: Endoscopy;  Laterality: N/A;    CYSTOSCOPY N/A 08/26/2020    Procedure: CYSTOSCOPY;  Surgeon: Charlotte Walters Jr., MD;  Location: [facility] OR;  Service: Urology;  Laterality: N/A;    DILATION AND CURETTAGE OF UTERUS      DISSECTION OF NECK Left 09/13/2021    Procedure: DISSECTION, NECK;  Surgeon: Ryan Torres MD;  Location: [address];  Service: ENT;  Laterality: Left;    EPIDURAL STEROID INJECTION INTO CERVICAL SPINE N/A 3/27/2024    Procedure: Injection-steroid-epidural-cervical;  Surgeon: Julián Chiang MD;  Location: [facility] OR;  Service: Anesthesiology;  Laterality: N/A;  c7-T1    EPIDURAL STEROID INJECTION INTO LUMBAR SPINE N/A 7/20/2023    Procedure: Injection-steroid-epidural-lumbar;  Surgeon: Julián Chiang MD;  Location: [facility] OR;  Service: Pain Management;  Laterality: N/A;  L5-s1     ESOPHAGEAL MANOMETRY WITH MEASUREMENT OF IMPEDANCE N/A 08/22/2022    Procedure: MANOMETRY, ESOPHAGUS, WITH IMPEDANCE MEASUREMENT;  Surgeon: Pantera Mcguire MD;  Location: [facility] ENDO (4TH FLR);  Service: Endoscopy;  Laterality: N/A;  8/4 fully vaccinated; instructions mailed-st    ESOPHAGOGASTRODUODENOSCOPY N/A 05/20/2020    Dr. Britton; small hiatal hernia; gastritis; 8 gastric polyps removed    ESOPHAGOGASTRODUODENOSCOPY N/A 04/01/2022    Procedure: EGD (ESOPHAGOGASTRODUODENOSCOPY);  Surgeon: Jose Britton MD;  Location: [facility];  Service: Endoscopy;  Laterality: N/A;    FOOT SURGERY      HYSTERECTOMY      TVH/BSO > 20 years    INCISION OF VULVA Bilateral 8/4/2023    Procedure: EXCISION, CYST, LABIA AND OTHER INDICATED PROCEDURES;  Surgeon: Mat Beach MD;  Location: [facility] OR;  Service: OB/GYN;  Laterality: Bilateral;  EXCYSION OF VULVAR CYST    INJECTION, SACROILIAC JOINT Right 12/1/2023    Procedure: INJECTION,SACROILIAC JOINT;  Surgeon: Julián Chaing MD;  Location: [facility] OR;  Service: Anesthesiology;  Laterality: Right;  sacroiliac injection    INTRALUMINAL GASTROINTESTINAL TRACT IMAGING VIA CAPSULE N/A 06/04/2020    Procedure: IMAGING PROCEDURE, GI TRACT, INTRALUMINAL, VIA CAPSULE;  Surgeon: Jose Britton MD;  Location: [facility];  Service: Endoscopy;  Laterality: N/A;    KNEE SURGERY  06/06/2013    right knee tear Dr Iverson     LAPAROSCOPIC CHOLECYSTECTOMY N/A 09/17/2020    Procedure: CHOLECYSTECTOMY, LAPAROSCOPIC;  Surgeon: Forrest Veronica MD;  Location: [facility] OR;  Service: General;  Laterality: N/A;    LUNG LOBECTOMY  1966    right middle lobectomy, due to Tb    OOPHORECTOMY      SHOULDER SURGERY      francis     THYROIDECTOMY Bilateral 05/19/2021    Procedure: THYROIDECTOMY;  Surgeon: Jamia Amezquita MD;  Location: [facility] 2ND FLR;  Service: General;  Laterality: Bilateral;    THYROIDECTOMY Bilateral 09/13/2021    Procedure: THYROIDECTOMY WITH INTRA-OP PTH;  Surgeon: Ryan Torres MD;   "Location: [facility] OR [address] [place] [address];  Service: ENT;  Laterality: Bilateral;  NIM tube  Intraop PTH     Family History   Problem Relation Name Age of Onset    Cancer Mother          thyroid    Hypertension Mother      Hyperlipidemia Mother      Hypertension Father      Emphysema Father      Dementia Sister      Alzheimer's disease Sister      Breast cancer Sister  60    Cancer Brother          stomach    Cancer Brother      No Known Problems Daughter      Diabetes Son      Hypertension Son      Alcohol abuse Son      Diabetes Maternal Aunt      Alzheimer's disease Maternal Uncle      Obesity Paternal Uncle      Cancer Maternal Grandmother      Colon cancer Other nephew     Prostate cancer Other nephew     Melanoma Neg Hx      Psoriasis Neg Hx      Lupus Neg Hx      Eczema Neg Hx      Amblyopia Neg Hx      Blindness Neg Hx      Cataracts Neg Hx      Glaucoma Neg Hx      Macular degeneration Neg Hx      Retinal detachment Neg Hx      Strabismus Neg Hx      Stroke Neg Hx      Thyroid cancer Neg Hx      Colon polyps Neg Hx      Ulcerative colitis Neg Hx      Stomach cancer Neg Hx      Rectal cancer Neg Hx       Vitals:    06/17/24 0954   Weight: 75 kg (165 lb 5.5 oz)   Height: 5' 5.98" (1.676 m)       Review of Systems   Constitutional:  Negative for chills, diaphoresis, fatigue, fever and unexpected weight change.   HENT:  Negative for trouble swallowing.    Eyes:  Negative for visual disturbance.   Respiratory:  Negative for shortness of breath.    Cardiovascular:  Negative for chest pain.   Gastrointestinal:  Negative for abdominal pain, constipation, nausea and vomiting.   Genitourinary:  Negative for difficulty urinating.   Musculoskeletal:  Negative for arthralgias, back pain, gait problem, joint swelling, myalgias, neck pain and neck stiffness.   Neurological:  Negative for dizziness, speech difficulty, weakness, light-headedness, numbness and headaches.          Objective:      Physical Exam  Neurological:      Mental " Status: She is alert and oriented to person, place, and time.      Comments: She is awake and in no acute distress  No point tenderness or palpable masses about cervical spine  No palpable masses in the anterior neck as well             Assessment:       1. Cervicalgia           Plan:     I suspect she has cervicalgia on the basis of known cervical degenerative disc disease.  We will give her a shot of Depo-Medrol and Toradol today.  Check on her by phone in about 1 week to see how she is coming along.  Consider adding physical therapy      Cervicalgia

## 2024-06-18 ENCOUNTER — CLINICAL DOCUMENTATION (OUTPATIENT)
Age: 69
End: 2024-06-18

## 2024-07-11 ENCOUNTER — TELEPHONE (OUTPATIENT)
Age: 69
End: 2024-07-11

## 2024-07-11 NOTE — TELEPHONE ENCOUNTER
[facility] faxed over a copy of order for HH that needed to be completed. It was put on Dr Perez's desk 06.14.2024. [facility]'s phone: [phone]

## 2024-07-15 NOTE — TELEPHONE ENCOUNTER
It was faxed to them on 6/18/24. Order re-faxed to [facility]. Fax number [phone]. Confirmation recevied.

## 2025-01-14 NOTE — TELEPHONE ENCOUNTER
Onur with [facility] called in and states he went to see pt today and her bp was 88/69. States she rated her pain in the lower extremity was a 8/9 out of 10, states it takes 2 people to stand her up and when she stands up she feels really dizzy.    pt denies any recent substance use including alcohol

## 2025-01-22 NOTE — PROGRESS NOTES
EMR reviewed. Noted that OT has recommended therapy. PT was not able to work with the patient today as pt was feeling nauseous. Orders will be needed to set up home health if that is recommended at discharge.  Thanks Hue Parker MSW none

## 2025-04-04 NOTE — TELEPHONE ENCOUNTER
Received call back from HungHelen Randall/ [facility]. Requesting return call. Ms Randall's direct phone: [phone]. No

## 2025-04-29 NOTE — WOUND CARE
Wound Consult:    New assessment visit. Chart reviewed.  Consulted for \"bed sores\".  Spoke with patients nurse,  Lisy.  Patient seen in room 480, is resting on a amanda intouch bed with supportive mattress.  Heels off loaded with pillows.  Patient is alert and communicative; requires 2 assist to move side to side in bed.  Tj score 16.    Assessment:  Sacrum- pink blanchable resurfaced skin, no open area  Heels- no erythema, no open area    Treatment:  Sacrum- sacral foam applied for protection  Heels- floated on pillows    Skin Care / PI Prevention Recommendations:  1. Minimize friction/shear: minimize layers of linen/pads under patient.  2. Off load pressure/reposition:  turn and reposition approximately every 2 hours; float heels with pillows or use off loading heel boots; waffle cushion for sitting; position wedge.  3. Manage Moisture - keep skin folds dry; incontinence skin care with incontinence wipes; barrier ointment.  4. Continue to monitor nutrition, pain, and skin risk scale, and skin assessment.    Plan:  No concerns to share with provider.  Please re-consult should concerns arise despite continued skin/PI prevention measures.    Emy Pelaez RN  [facility] Inpatient Wound Care Department  Office [phone]  Available via [employer]    
MVC

## (undated) DEVICE — DRAPE,EXTREMITY,89X128,STERILE: Brand: MEDLINE

## (undated) DEVICE — TUBING PRSS MON L6IN PVC M FEM CONN

## (undated) DEVICE — 1200 GUARD II KIT W/5MM TUBE W/O VAC TUBE: Brand: GUARDIAN

## (undated) DEVICE — MASK,OXYGEN,3-IN-1,ADULT,7,SC: Brand: MEDLINE

## (undated) DEVICE — Device: Brand: FIELDER XT

## (undated) DEVICE — BASIN EMSIS 16OZ GRAPHITE PLAS KID SHP MOLD GRAD FOR ORAL

## (undated) DEVICE — BASIN EMESIS 500CC ROSE 250/CS 60/PLT: Brand: MEDEGEN MEDICAL PRODUCTS, LLC

## (undated) DEVICE — AIRLIFE™  ADULT CUSHION NASAL CANNULA WITH 7 FOOT (2.1 M) CRUSH-RESISTANT OXYGEN TUBING, AND U/CONNECT-IT ADAPTER: Brand: AIRLIFE™

## (undated) DEVICE — PADDING CAST 4 INX5 YD STRL

## (undated) DEVICE — 3.5MM X 30MM NON-LOCKING HEXALOBE SCREW
Type: IMPLANTABLE DEVICE | Site: ANKLE | Status: NON-FUNCTIONAL
Brand: ACUMED
Removed: 2019-05-24

## (undated) DEVICE — FORCEPS BX L240CM JAW DIA2.8MM L CAP W/ NDL MIC MESH TOOTH

## (undated) DEVICE — PINNACLE PRECISION ACCESS SYSTEM INTRODUCER SHEATH: Brand: PINNACLE PRECISION ACCESS SYSTEM

## (undated) DEVICE — HANDLE LT SNAP ON ULT DURABLE LENS FOR TRUMPF ALC DISPOSABLE

## (undated) DEVICE — GUIDEWIRE VASC L145CM 0.035IN J TIP L3MM PTFE FIX COR NAMIC

## (undated) DEVICE — INTENDED FOR TISSUE SEPARATION, AND OTHER PROCEDURES THAT REQUIRE A SHARP SURGICAL BLADE TO PUNCTURE OR CUT.: Brand: BARD-PARKER ® CARBON RIB-BACK BLADES

## (undated) DEVICE — NDL FLTR TIP 5 MIC 18GX1.5IN --

## (undated) DEVICE — GLIDESHEATH SLENDER ACCESS KIT: Brand: GLIDESHEATH SLENDER

## (undated) DEVICE — Device

## (undated) DEVICE — SYR 10ML LUER LOK 1/5ML GRAD --

## (undated) DEVICE — 3M™ TEGADERM™ TRANSPARENT FILM DRESSING FRAME STYLE, 1626W, 4 IN X 4-3/4 IN (10 CM X 12 CM), 50/CT 4CT/CASE: Brand: 3M™ TEGADERM™

## (undated) DEVICE — ANGIOGRAPHY KIT CUST [K0910930B] [MERIT MEDICAL SYSTEMS INC]

## (undated) DEVICE — SOLIDIFIER FLD 2OZ 1500CC N DISINF IN BTL DISP SAFESORB

## (undated) DEVICE — DRAPE,REIN 53X77,STERILE: Brand: MEDLINE

## (undated) DEVICE — PACK,LAPAROTOMY,2 REINFORCED GOWNS: Brand: MEDLINE

## (undated) DEVICE — KIT,ANTI FOG,W/SPONGE & FLUID,SOFT PACK: Brand: MEDLINE

## (undated) DEVICE — PACK,BASIC,SIRUS,V: Brand: MEDLINE

## (undated) DEVICE — FCPS BIOP PULM RAD JAW 100CML -- BX/10 M00515180

## (undated) DEVICE — CONTAINER SPEC 20 ML LID NEUT BUFF FORMALIN 10 % POLYPR STS

## (undated) DEVICE — SET ADMIN 16ML TBNG L100IN 2 Y INJ SITE IV PIGGY BK DISP (ORDER IN MULIPLES OF 48)

## (undated) DEVICE — TREK CORONARY DILATATION CATHETER 2.50 MM X 15 MM / RAPID-EXCHANGE: Brand: TREK

## (undated) DEVICE — GLOVE ORANGE PI 7   MSG9070

## (undated) DEVICE — COVER LT HNDL PLAS RIG 1 PER PK

## (undated) DEVICE — CATHETER GUID 7FR L100CM DIA0.081IN NYL SHFT AL1.0 W/ SIDE

## (undated) DEVICE — BAG SPEC BIOHZRD 10 X 10 IN --

## (undated) DEVICE — STERILE POLYISOPRENE POWDER-FREE SURGICAL GLOVES: Brand: PROTEXIS

## (undated) DEVICE — 3.5MM QR DRILL, LAG: Brand: ACUMED

## (undated) DEVICE — KENDALL RADIOLUCENT FOAM MONITORING ELECTRODE -RECTANGULAR SHAPE: Brand: KENDALL

## (undated) DEVICE — HI-TORQUE VERSACORE FLOPPY GUIDE WIRE SYSTEM 145 CM: Brand: HI-TORQUE VERSACORE

## (undated) DEVICE — PREP SKN CHLRAPRP APL 26ML STR --

## (undated) DEVICE — IV STRT KT 3282] LSL INDUSTRIES INC]

## (undated) DEVICE — GUIDEWIRE VASC L260CM 0.035IN J TIP L3MM PTFE FIX COR NAMIC

## (undated) DEVICE — SUTURE VCRL SZ 2-0 L36IN ABSRB UD L36MM CT-1 1/2 CIR J945H

## (undated) DEVICE — GOWN,SIRUS,NONRNF,SETINSLV,2XL,18/CS: Brand: MEDLINE

## (undated) DEVICE — 2.8MM QR DRILL, W/ DEPTH MARKS: Brand: ACUMED

## (undated) DEVICE — SYR 3ML LL TIP 1/10ML GRAD --

## (undated) DEVICE — CATH TRAPPER EXCHANGE --

## (undated) DEVICE — REM POLYHESIVE ADULT PATIENT RETURN ELECTRODE: Brand: VALLEYLAB

## (undated) DEVICE — ROCKER SWITCH PENCIL BLADE ELECTRODE, HOLSTER: Brand: EDGE

## (undated) DEVICE — ELECTRODE,RADIOTRANSLUCENT,FOAM,3PK: Brand: MEDLINE

## (undated) DEVICE — BRUSH CYTO BRONCHSCP 1.5/140MM -- CELLEBRITY

## (undated) DEVICE — DRAPE OPHTH 4 PLY SGL FEN

## (undated) DEVICE — (D)PREP SKN CHLRAPRP APPL 26ML -- CONVERT TO ITEM 371833

## (undated) DEVICE — BITEBLOCK ENDOSCP 60FR MAXI WHT POLYETH STURDY W/ VELC WVN

## (undated) DEVICE — Device: Brand: ASAHI SION BLUE

## (undated) DEVICE — SUTURE VCRL SZ 3-0 L27IN ABSRB UD FS-2 L19MM 1/2 CIR J423H

## (undated) DEVICE — SYRINGE MED 5ML STD CLR PLAS LUERLOCK TIP N CTRL DISP

## (undated) DEVICE — DRESSING,GAUZE,XEROFORM,CURAD,1"X8",ST: Brand: CURAD

## (undated) DEVICE — BANDAGE COMPR W6INXL10YD ST M E WHITE/BEIGE

## (undated) DEVICE — SPONGE GZ W4XL4IN COT 12 PLY TYP VII WVN C FLD DSGN

## (undated) DEVICE — SUT PROL 2-0 30IN CT2 BLU --

## (undated) DEVICE — BLUNTFILL: Brand: MONOJECT

## (undated) DEVICE — THIS ADAPTER IS A DOUBLE SEALING FEMALE LUER LOCK ADAPTER WITH A 2-PIECE, COMBINATION COMPRESSION FIT/BARBED CATHETER CONNECTOR. THE ADAPTER IS USED TO CONNECT THE PD CATHETER TO A SOLUTION TRANSFER SET WITH LOCKING CONNECTOR.: Brand: LOCKING TITANIUM ADAPTER FOR PERITONEAL DIALYSIS CATHETER

## (undated) DEVICE — ZIMMER® STERILE DISPOSABLE TOURNIQUET CUFF WITH PROTECTIVE SLEEVE AND PLC, DUAL PORT, SINGLE BLADDER, 34 IN. (86 CM)

## (undated) DEVICE — CATH IV AUTOGRD BC BLU 22GA 25 -- INSYTE

## (undated) DEVICE — ARGYLE FRAZIER SURGICAL SUCTION INSTRUMENT 10 FR/CH (3.3 MM): Brand: ARGYLE

## (undated) DEVICE — 2.0MM QR DRILL, W/ DEPTH MARKS: Brand: ACUMED

## (undated) DEVICE — 3000CC GUARDIAN II: Brand: GUARDIAN

## (undated) DEVICE — KIT COLON W/ 1.1OZ LUB AND 2 END

## (undated) DEVICE — DERMABOND SKIN ADH 0.7ML -- DERMABOND ADVANCED 12/BX

## (undated) DEVICE — DRAPE XR C ARM 41X74IN LF --

## (undated) DEVICE — CATHETER IV 22GA L1IN OD0.8382-0.9144MM ID0.6096-0.6858MM

## (undated) DEVICE — SOL IRRIGATION INJ NACL 0.9% 500ML BTL

## (undated) DEVICE — CANNULA CUSH AD W/ 14FT TBG

## (undated) DEVICE — SPLINT CAST W1INXL15FT INTLOK PERFRMANCE TECHNOLOGY RL FRM

## (undated) DEVICE — SUTURE PROL SZ 0 L30IN NONABSORBABLE BLU L26MM CT-2 1/2 CIR 8412H

## (undated) DEVICE — NC TREK CORONARY DILATATION CATHETER 2.75 MM X 15 MM / RAPID-EXCHANGE: Brand: NC TREK

## (undated) DEVICE — PADDING CST 4INX4YD --

## (undated) DEVICE — SYRINGE MED 10CC ECC TIP W/O NDL

## (undated) DEVICE — HYPODERMIC SAFETY NEEDLE: Brand: MONOJECT

## (undated) DEVICE — TRAP SUC MUCOUS 70ML -- MEDICHOICE MEDLINE

## (undated) DEVICE — ANGIO-SEAL VIP VASCULAR CLOSURE DEVICE: Brand: ANGIO-SEAL

## (undated) DEVICE — BASIN ST MAJOR-NO CAUTERY: Brand: MEDLINE INDUSTRIES, INC.

## (undated) DEVICE — CATHETER GUID TURNPIKE 135CM DIA2.9X2.9X1.6FR 0.014IN

## (undated) DEVICE — SYR 5ML 1/5 GRAD LL NSAF LF --

## (undated) DEVICE — BLUNTFILL WITH FILTER: Brand: MONOJECT

## (undated) DEVICE — 3.5MM X 14MM NON-LOCKING HEXALOBE SCREW
Type: IMPLANTABLE DEVICE | Site: ANKLE | Status: NON-FUNCTIONAL
Brand: ACUMED
Removed: 2019-05-24

## (undated) DEVICE — MEDI-TRACE CADENCE ADULT, DEFIBRILLATION ELECTRODE -RTS  (10 PR/PK) - PHILIPS: Brand: MEDI-TRACE CADENCE

## (undated) DEVICE — BAG BELONG PT PERS CLEAR HANDL

## (undated) DEVICE — STERILE POLYISOPRENE POWDER-FREE SURGICAL GLOVES WITH EMOLLIENT COATING: Brand: PROTEXIS

## (undated) DEVICE — 3.5MM X 18MM NON-LOCKING HEXALOBE SCREW
Type: IMPLANTABLE DEVICE | Site: ANKLE | Status: NON-FUNCTIONAL
Brand: ACUMED
Removed: 2019-05-24

## (undated) DEVICE — SURGICAL PROCEDURE PACK BASIN MAJ SET CUST NO CAUT

## (undated) DEVICE — HI-TORQUE PILOT 200 GUIDE WIRE .014 STRAIGHT TIP 3.0 CM X 190 CM: Brand: HI-TORQUE PILOT

## (undated) DEVICE — PACK PROCEDURE SURG HRT CATH

## (undated) DEVICE — CATH GUID COR AL75 7FR 100CM -- LAUNCHER

## (undated) DEVICE — AIRLIFE™ CORRUGATED FLEXIBLE EVA TUBING FOR AEROSOL AND IPPB USE, SEGMENTED, 6 FEET (1.8 M) LENGTH, 22 MM I.D.: Brand: AIRLIFE™

## (undated) DEVICE — SET ADMIN 16ML TBNG L100IN 2 Y INJ SITE IV PIGGY BK DISP

## (undated) DEVICE — NDL PRT INJ NSAF BLNT 18GX1.5 --

## (undated) DEVICE — RADIFOCUS OPTITORQUE ANGIOGRAPHIC CATHETER: Brand: OPTITORQUE

## (undated) DEVICE — STRAP POS KNEE BODY VELC

## (undated) DEVICE — SYRINGE MED 3ML CLR PLAS STD N CTRL LUERLOCK TIP DISP

## (undated) DEVICE — SYRINGE ANGIO 10 CC BRL STD PRNT POLYCARB LT BLU MEDALLION

## (undated) DEVICE — SOLUTION IRRIG 1000ML 0.9% SOD CHL USP POUR PLAS BTL

## (undated) DEVICE — SUTURE PERMAHAND SZ 2-0 L30IN NONABSORBABLE BLK SILK W/O A305H

## (undated) DEVICE — BLADE ASSEMB CLP HAIR FINE --

## (undated) DEVICE — SOLIDIFIER MEDC 1200ML -- CONVERT TO 356117

## (undated) DEVICE — SLIM BODY SKIN STAPLER: Brand: APPOSE ULC

## (undated) DEVICE — INFECTION CONTROL KIT SYS

## (undated) DEVICE — Device: Brand: CONFIANZA PRO 12

## (undated) DEVICE — CATHETER BLLN ANGIO SPRINTER LEGEND RX DIL 1.5MMX12MM 142CM

## (undated) DEVICE — TR BAND RADIAL ARTERY COMPRESSION DEVICE: Brand: TR BAND

## (undated) DEVICE — SUTURE ETHLN SZ 2-0 L18IN NONABSORBABLE BLK L19MM PS-2 PRIM 593H

## (undated) DEVICE — (D)NEBLZR PREFIL STRL H2O 1000

## (undated) DEVICE — COPILOT BLEEDBACK CONTROL VALVE: Brand: COPILOT

## (undated) DEVICE — 3.5MM X 12MM NON-LOCKING HEXALOBE SCREW
Type: IMPLANTABLE DEVICE | Site: ANKLE | Status: NON-FUNCTIONAL
Brand: ACUMED
Removed: 2019-05-24

## (undated) DEVICE — TROCAR: Brand: KII® SLEEVE

## (undated) DEVICE — TOWEL SURG W17XL27IN STD BLU COT NONFENESTRATED PREWASHED

## (undated) DEVICE — BANDAGE COMPR SELF ADH 5 YDX4 IN TAN STRL PREMIERPRO LF